# Patient Record
Sex: FEMALE | Race: OTHER | HISPANIC OR LATINO | URBAN - METROPOLITAN AREA
[De-identification: names, ages, dates, MRNs, and addresses within clinical notes are randomized per-mention and may not be internally consistent; named-entity substitution may affect disease eponyms.]

---

## 2017-02-06 ENCOUNTER — INPATIENT (INPATIENT)
Facility: HOSPITAL | Age: 57
LOS: 8 days | Discharge: HOME | DRG: 546 | End: 2017-02-15
Attending: HOSPITALIST | Admitting: INTERNAL MEDICINE
Payer: COMMERCIAL

## 2017-02-06 VITALS
OXYGEN SATURATION: 96 % | HEART RATE: 102 BPM | TEMPERATURE: 98 F | RESPIRATION RATE: 18 BRPM | DIASTOLIC BLOOD PRESSURE: 109 MMHG | SYSTOLIC BLOOD PRESSURE: 168 MMHG

## 2017-02-06 DIAGNOSIS — E11.65 TYPE 2 DIABETES MELLITUS WITH HYPERGLYCEMIA: ICD-10-CM

## 2017-02-06 DIAGNOSIS — D64.9 ANEMIA, UNSPECIFIED: ICD-10-CM

## 2017-02-06 DIAGNOSIS — R10.9 UNSPECIFIED ABDOMINAL PAIN: ICD-10-CM

## 2017-02-06 LAB
ACETONE SERPL-MCNC: ABNORMAL
ALBUMIN SERPL ELPH-MCNC: 2.6 G/DL — LOW (ref 3.3–5.2)
ALP SERPL-CCNC: 104 U/L — SIGNIFICANT CHANGE UP (ref 40–120)
ALT FLD-CCNC: 8 U/L — SIGNIFICANT CHANGE UP
ANION GAP SERPL CALC-SCNC: 15 MMOL/L — SIGNIFICANT CHANGE UP (ref 5–17)
ANISOCYTOSIS BLD QL: SLIGHT — SIGNIFICANT CHANGE UP
APTT BLD: 29.4 SEC — SIGNIFICANT CHANGE UP (ref 27.5–37.4)
AST SERPL-CCNC: 9 U/L — SIGNIFICANT CHANGE UP
BASE EXCESS BLDV CALC-SCNC: -2.1 MMOL/L — SIGNIFICANT CHANGE UP (ref -3–3)
BASOPHILS # BLD AUTO: 0 K/UL — SIGNIFICANT CHANGE UP (ref 0–0.2)
BASOPHILS NFR BLD AUTO: 0.1 % — SIGNIFICANT CHANGE UP (ref 0–2)
BILIRUB SERPL-MCNC: 0.6 MG/DL — SIGNIFICANT CHANGE UP (ref 0.4–2)
BLD GP AB SCN SERPL QL: SIGNIFICANT CHANGE UP
BLOOD GAS COMMENTS, VENOUS: SIGNIFICANT CHANGE UP
BUN SERPL-MCNC: 27 MG/DL — HIGH (ref 8–20)
CALCIUM SERPL-MCNC: 8.6 MG/DL — SIGNIFICANT CHANGE UP (ref 8.6–10.2)
CHLORIDE SERPL-SCNC: 89 MMOL/L — LOW (ref 98–107)
CO2 SERPL-SCNC: 21 MMOL/L — LOW (ref 22–29)
CREAT SERPL-MCNC: 1.24 MG/DL — SIGNIFICANT CHANGE UP (ref 0.5–1.3)
EOSINOPHIL # BLD AUTO: 0 K/UL — SIGNIFICANT CHANGE UP (ref 0–0.5)
GAS PNL BLDV: SIGNIFICANT CHANGE UP
GLUCOSE SERPL-MCNC: 679 MG/DL — CRITICAL HIGH (ref 70–115)
HCO3 BLDV-SCNC: 22 MMOL/L — SIGNIFICANT CHANGE UP (ref 20–26)
HCT VFR BLD CALC: 21.2 % — LOW (ref 37–47)
HGB BLD-MCNC: 6.9 G/DL — CRITICAL LOW (ref 12–16)
HOROWITZ INDEX BLDV+IHG-RTO: SIGNIFICANT CHANGE UP
HYPOCHROMIA BLD QL: SLIGHT — SIGNIFICANT CHANGE UP
INR BLD: 1.05 RATIO — SIGNIFICANT CHANGE UP (ref 0.88–1.16)
LIDOCAIN IGE QN: 58 U/L — HIGH (ref 22–51)
LYMPHOCYTES # BLD AUTO: 1.1 K/UL — SIGNIFICANT CHANGE UP (ref 1–4.8)
LYMPHOCYTES # BLD AUTO: 11 % — LOW (ref 20–55)
MACROCYTES BLD QL: SLIGHT — SIGNIFICANT CHANGE UP
MCHC RBC-ENTMCNC: 27.1 PG — SIGNIFICANT CHANGE UP (ref 27–31)
MCHC RBC-ENTMCNC: 32.5 G/DL — SIGNIFICANT CHANGE UP (ref 32–36)
MCV RBC AUTO: 83.1 FL — SIGNIFICANT CHANGE UP (ref 81–99)
MICROCYTES BLD QL: SIGNIFICANT CHANGE UP
MONOCYTES # BLD AUTO: 0.4 K/UL — SIGNIFICANT CHANGE UP (ref 0–0.8)
MONOCYTES NFR BLD AUTO: 4 % — SIGNIFICANT CHANGE UP (ref 3–10)
NEUTROPHILS # BLD AUTO: 8.1 K/UL — HIGH (ref 1.8–8)
NEUTROPHILS NFR BLD AUTO: 84 % — HIGH (ref 37–73)
NEUTS BAND # BLD: 1 % — SIGNIFICANT CHANGE UP (ref 0–8)
PCO2 BLDV: 39 MMHG — SIGNIFICANT CHANGE UP (ref 35–50)
PH BLDV: 7.38 — SIGNIFICANT CHANGE UP (ref 7.35–7.45)
PLAT MORPH BLD: ABNORMAL
PLATELET # BLD AUTO: 756 K/UL — HIGH (ref 150–400)
PO2 BLDV: 37 MMHG — SIGNIFICANT CHANGE UP (ref 25–45)
POIKILOCYTOSIS BLD QL AUTO: SLIGHT — SIGNIFICANT CHANGE UP
POLYCHROMASIA BLD QL SMEAR: SLIGHT — SIGNIFICANT CHANGE UP
POTASSIUM SERPL-MCNC: 4.2 MMOL/L — SIGNIFICANT CHANGE UP (ref 3.5–5.3)
POTASSIUM SERPL-SCNC: 4.2 MMOL/L — SIGNIFICANT CHANGE UP (ref 3.5–5.3)
PROT SERPL-MCNC: 7.4 G/DL — SIGNIFICANT CHANGE UP (ref 6.6–8.7)
PROTHROM AB SERPL-ACNC: 11.6 SEC — SIGNIFICANT CHANGE UP (ref 10–13.1)
RBC # BLD: 2.55 M/UL — LOW (ref 4.4–5.2)
RBC # FLD: 13.9 % — SIGNIFICANT CHANGE UP (ref 11–15.6)
RBC BLD AUTO: ABNORMAL
SAO2 % BLDV: 63 % — LOW (ref 67–88)
SODIUM SERPL-SCNC: 125 MMOL/L — LOW (ref 135–145)
TYPE + AB SCN PNL BLD: SIGNIFICANT CHANGE UP
WBC # BLD: 10.22 K/UL — SIGNIFICANT CHANGE UP (ref 4.8–10.8)
WBC # FLD AUTO: 10.22 K/UL — SIGNIFICANT CHANGE UP (ref 4.8–10.8)

## 2017-02-06 PROCEDURE — 71020: CPT | Mod: 26

## 2017-02-06 PROCEDURE — 99223 1ST HOSP IP/OBS HIGH 75: CPT

## 2017-02-06 PROCEDURE — 99291 CRITICAL CARE FIRST HOUR: CPT

## 2017-02-06 RX ORDER — DEXTROSE 50 % IN WATER 50 %
25 SYRINGE (ML) INTRAVENOUS ONCE
Qty: 0 | Refills: 0 | Status: DISCONTINUED | OUTPATIENT
Start: 2017-02-06 | End: 2017-02-07

## 2017-02-06 RX ORDER — INSULIN GLARGINE 100 [IU]/ML
5 INJECTION, SOLUTION SUBCUTANEOUS AT BEDTIME
Qty: 0 | Refills: 0 | Status: DISCONTINUED | OUTPATIENT
Start: 2017-02-06 | End: 2017-02-09

## 2017-02-06 RX ORDER — SODIUM CHLORIDE 9 MG/ML
1000 INJECTION INTRAMUSCULAR; INTRAVENOUS; SUBCUTANEOUS ONCE
Qty: 0 | Refills: 0 | Status: COMPLETED | OUTPATIENT
Start: 2017-02-06 | End: 2017-02-06

## 2017-02-06 RX ORDER — GLUCAGON INJECTION, SOLUTION 0.5 MG/.1ML
1 INJECTION, SOLUTION SUBCUTANEOUS ONCE
Qty: 0 | Refills: 0 | Status: DISCONTINUED | OUTPATIENT
Start: 2017-02-06 | End: 2017-02-07

## 2017-02-06 RX ORDER — SODIUM CHLORIDE 9 MG/ML
1000 INJECTION INTRAMUSCULAR; INTRAVENOUS; SUBCUTANEOUS
Qty: 0 | Refills: 0 | Status: DISCONTINUED | OUTPATIENT
Start: 2017-02-06 | End: 2017-02-07

## 2017-02-06 RX ORDER — INSULIN HUMAN 100 [IU]/ML
12 INJECTION, SOLUTION SUBCUTANEOUS ONCE
Qty: 0 | Refills: 0 | Status: COMPLETED | OUTPATIENT
Start: 2017-02-06 | End: 2017-02-06

## 2017-02-06 RX ORDER — ONDANSETRON 8 MG/1
4 TABLET, FILM COATED ORAL EVERY 6 HOURS
Qty: 0 | Refills: 0 | Status: DISCONTINUED | OUTPATIENT
Start: 2017-02-06 | End: 2017-02-15

## 2017-02-06 RX ORDER — PANTOPRAZOLE SODIUM 20 MG/1
80 TABLET, DELAYED RELEASE ORAL ONCE
Qty: 0 | Refills: 0 | Status: COMPLETED | OUTPATIENT
Start: 2017-02-06 | End: 2017-02-06

## 2017-02-06 RX ORDER — DEXTROSE 50 % IN WATER 50 %
12.5 SYRINGE (ML) INTRAVENOUS ONCE
Qty: 0 | Refills: 0 | Status: DISCONTINUED | OUTPATIENT
Start: 2017-02-06 | End: 2017-02-07

## 2017-02-06 RX ORDER — MAGNESIUM SULFATE 500 MG/ML
1 VIAL (ML) INJECTION ONCE
Qty: 0 | Refills: 0 | Status: COMPLETED | OUTPATIENT
Start: 2017-02-06 | End: 2017-02-07

## 2017-02-06 RX ORDER — PANTOPRAZOLE SODIUM 20 MG/1
8 TABLET, DELAYED RELEASE ORAL
Qty: 80 | Refills: 0 | Status: DISCONTINUED | OUTPATIENT
Start: 2017-02-06 | End: 2017-02-07

## 2017-02-06 RX ORDER — DEXTROSE 50 % IN WATER 50 %
1 SYRINGE (ML) INTRAVENOUS ONCE
Qty: 0 | Refills: 0 | Status: DISCONTINUED | OUTPATIENT
Start: 2017-02-06 | End: 2017-02-07

## 2017-02-06 RX ORDER — SODIUM CHLORIDE 9 MG/ML
1000 INJECTION, SOLUTION INTRAVENOUS
Qty: 0 | Refills: 0 | Status: DISCONTINUED | OUTPATIENT
Start: 2017-02-06 | End: 2017-02-07

## 2017-02-06 RX ADMIN — SODIUM CHLORIDE 3000 MILLILITER(S): 9 INJECTION INTRAMUSCULAR; INTRAVENOUS; SUBCUTANEOUS at 14:31

## 2017-02-06 RX ADMIN — INSULIN HUMAN 12 UNIT(S): 100 INJECTION, SOLUTION SUBCUTANEOUS at 17:15

## 2017-02-06 RX ADMIN — SODIUM CHLORIDE 3000 MILLILITER(S): 9 INJECTION INTRAMUSCULAR; INTRAVENOUS; SUBCUTANEOUS at 14:40

## 2017-02-06 RX ADMIN — INSULIN GLARGINE 5 UNIT(S): 100 INJECTION, SOLUTION SUBCUTANEOUS at 23:14

## 2017-02-06 RX ADMIN — PANTOPRAZOLE SODIUM 80 MILLIGRAM(S): 20 TABLET, DELAYED RELEASE ORAL at 14:32

## 2017-02-06 RX ADMIN — PANTOPRAZOLE SODIUM 10 MG/HR: 20 TABLET, DELAYED RELEASE ORAL at 17:14

## 2017-02-06 NOTE — H&P ADULT. - HISTORY OF PRESENT ILLNESS
The patient is a 56 year old female with history of diabetes mellitus type 2 on insulin who presented to the ER from the office of her PMD. Seen and examined with a  at the bedside. According to the patient for the past 2 months she has had complaints of vomiting atleast 2-3 times a day associated with generalized abdominal pain. Her symptoms have progressively worsened and she has had associated weakness. She uses lantus at home for her diabetes and says she is compliant. She went to her PMD today with complaints of weakness and dark stool. Her hemoglobin was noted to be 5gm/dl therefore she was sent to the ER. In the ER hb is 6.7gm/dl with positive melena and guaic positive. Also noted to be hyperglycemic with hyponatremia given 2 liters of normal saline with insulin.

## 2017-02-06 NOTE — ED ADULT NURSE NOTE - OBJECTIVE STATEMENT
55y/o female c/o dizziness and weakness x 3 days. Pt AOx3, resp even unlabored. denies chest pain, c/o SOb with exertion. Pt states she has had coffee ground emesis and dark stool, none noted in the ED. Pt pale for race. will continue to monitor.

## 2017-02-06 NOTE — ED ADULT NURSE REASSESSMENT NOTE - NS ED NURSE REASSESS COMMENT FT1
Pt AOx3, resp even unlabored. completed 1 unit of blood. no complaints at this time. will continue to monitor.

## 2017-02-06 NOTE — ED PROVIDER NOTE - CRITICAL CARE PROVIDED
consultation with other physicians/documentation/interpretation of diagnostic studies/additional history taking/direct patient care (not related to procedure)

## 2017-02-06 NOTE — ED CLERICAL - NS ED CLERK UNITS
6TWR 6TWR/[id number] 6TWR/PAIN ACS 6TWR/[id number] PAIN ACS/6TWR MON/PAIN ACS/2GUL PAIN ACS/5TWR /6TWR 5TWR/mon 5TWR/[id number] med overflow

## 2017-02-06 NOTE — CONSULT NOTE ADULT - ASSESSMENT
Severe anemia.  Alleged chronic black stool with negative colonoscopy 2 yrs ago.  R/o PUD,  Gastric Neoplasia or Gastric motility disorder, or erosive esophagitis.

## 2017-02-06 NOTE — CONSULT NOTE ADULT - SUBJECTIVE AND OBJECTIVE BOX
HPI:  The patient is a 56 year old  female with history of diabetes mellitus type 2 on insulin who presented to the ER from the office of her PMD. Daughter serves as  at bedside.   According to the patient for the past 2 months she has had vomiting several times per day, occasionall pink in color.  Notes  generalized upper abdominal pain, anorexia and weight loss. Daughter alleges that she has lost 100#/ 3 months.  Also notes early satiety and frequent post prandial vomiting.  No prior EGD.  No prior hx of PUD.  No intake of aspirin or NSAIDs.  Notes 2 BM's/ day for past 2 months.  Alleges that stool has been black.  Daughter states that a colonoscopy was normal in 2015.  Unclear where it was performed.    She uses lantus at home for her diabetes and says she is compliant. She went to her PMD today with complaints of weakness and dark stool. Her hemoglobin was noted to be 5gm/dl therefore she was sent to the ER. In the ER hb is 6.7gm/dl with positive melena and guaic positive. Also noted to be hyperglycemic (679),  with hyponatremia & given 2 liters of normal saline with insulin. (06 Feb 2017 19:02)      PAST MEDICAL & SURGICAL HISTORY:  DM (diabetes mellitus),  Newly diagnosed 2 months ago.    No significant past surgical history      ROS:  No Heartburn, regurgitation, dysphagia, odynophagia.  No dyspepsia  No abdominal pain.    No Nausea, vomiting.  No Bleeding.  No hematemesis.   No diarrhea.    No hematochesia.  No weight loss, anorexia.  No edema.      MEDICATIONS  (STANDING):  pantoprazole Infusion 8mG/Hr IV Continuous <Continuous>  sodium chloride 0.9%. 1000milliLiter(s) IV Continuous <Continuous>  magnesium sulfate  IVPB 1Gram(s) IV Intermittent once  dextrose 5%. 1000milliLiter(s) IV Continuous <Continuous>  dextrose 50% Injectable 12.5Gram(s) IV Push once  dextrose 50% Injectable 25Gram(s) IV Push once  dextrose 50% Injectable 25Gram(s) IV Push once  insulin glargine Injectable (LANTUS) 5Unit(s) SubCutaneous at bedtime    MEDICATIONS  (PRN):  ondansetron Injectable 4milliGRAM(s) IV Push every 6 hours PRN Nausea  dextrose Gel 1Dose(s) Oral once PRN Blood Glucose LESS THAN 70 milliGRAM(s)/deciliter  glucagon  Injectable 1milliGRAM(s) IntraMuscular once PRN Glucose LESS THAN 70 milligrams/deciliter      Allergies    No Known Allergies    SOCIAL HISTORY:    FAMILY HISTORY:  No pertinent family history in first degree relatives;  No CRCa in FH    Vital Signs Last 24 Hrs  T(C): 37.7, Max: 37.7 (02-06 @ 19:26)  T(F): 99.9, Max: 99.9 (02-06 @ 19:26)  HR: 92 (92 - 102)  BP: 145/86 (145/86 - 168/109)  BP(mean): --  RR: 18 (18 - 18)  SpO2: 91% (91% - 96%)    PHYSICAL EXAM:    GENERAL: NAD, well-groomed, well-developed  HEAD:  Atraumatic, Normocephalic  EYES: EOMI, PERRLA, conjunctiva and sclera clear  ENMT: No tonsillar erythema, exudates, or enlargement; Moist mucous membranes, Good dentition, No lesions  NECK: Supple, No JVD, Normal thyroid  CHEST/LUNG: Clear to percussion bilaterally; No rales, rhonchi, wheezing, or rubs  HEART: Regular rate and rhythm; No murmurs, rubs, or gallops  ABDOMEN: Soft, Nontender, Nondistended; Bowel sounds present.  No scars.  No HSM.  No MTR.  REGULO: normal.  Empty rectal vault.  No stool or blood.   EXTREMITIES:  2+ Peripheral Pulses, No clubbing, cyanosis, or edema  LYMPH: No lymphadenopathy noted  SKIN: No rashes or lesions      LABS:                        6.9    10.22 )-----------( 756      ( 06 Feb 2017 13:01 )   MCV: 83.               21.2     06 Feb 2017 13:01    125    |  89     |  27.0   ----------------------------<  679    4.2     |  21.0   |  1.24     Ca    8.6        06 Feb 2017 13:01  Phos  4.0       06 Feb 2017 13:01  Mg     1.7       06 Feb 2017 13:01    TPro  7.4    /  Alb  2.6    /  TBili  0.6    /  DBili  x      /  AST  9      /  ALT  8      /  AlkPhos  104    06 Feb 2017 13:01    PT/INR - ( 06 Feb 2017 13:01 )   PT: 11.6 sec;   INR: 1.05 ratio         PTT - ( 06 Feb 2017 13:01 )  PTT:29.4 sec       LIVER FUNCTIONS - ( 06 Feb 2017 13:01 )  Alb: 2.6 g/dL / Pro: 7.4 g/dL / ALK PHOS: 104 U/L / ALT: 8 U/L / AST: 9 U/L / GGT: x               RADIOLOGY & ADDITIONAL STUDIES:

## 2017-02-06 NOTE — ED ADULT TRIAGE NOTE - CHIEF COMPLAINT QUOTE
Patient arrived to ED today with c/o weakness, dizziness, and vomiting.  Patient was told to come to ED because her hemoglobin was low.

## 2017-02-06 NOTE — H&P ADULT. - ASSESSMENT
The patient is a 56 year old female with history of diabetes mellitus type 2 on insulin who presented to the ER from the office of her PMD. Seen and examined with a  at the bedside. According to the patient for the past 2 months she has had complaints of vomiting atleast 2-3 times a day associated with generalized abdominal pain. Her symptoms have progressively worsened and she has had associated weakness. She uses lantus at home for her diabetes and says she is compliant. She went to her PMD today with complaints of weakness and dark stool. Her hemoglobin was noted to be 5gm/dl therefore she was sent to the ER. In the ER hb is 6.7gm/dl with positive melena and guaic positive. Also noted to be hyperglycemic with hyponatremia given 2 liters of normal saline with insulin.     Assesment. The patient is a 56 year old female with history of diabetes mellitus type 2 on insulin who presented to the ER from the office of her PMD. Seen and examined with a  at the bedside. According to the patient for the past 2 months she has had complaints of vomiting atleast 2-3 times a day associated with generalized abdominal pain. Her symptoms have progressively worsened and she has had associated weakness. She uses lantus at home for her diabetes and says she is compliant. She went to her PMD today with complaints of weakness and dark stool. Her hemoglobin was noted to be 5gm/dl therefore she was sent to the ER. In the ER hb is 6.7gm/dl with positive melena and guaic positive. Also noted to be hyperglycemic with hyponatremia given 2 liters of normal saline with insulin.     Assesment/Plan:    1. Acute blood loss anemia likely secondary to GI bleed- IV fluids, npo, IV protonix, monitor cbc q6 hours, GI Dr Nunez consulted.   2. Hyperglycemia: IV hydration, start lantus at bedtime. Repeat BMP  check hba1c  3. Hyponatremia: Corrected for hyperglycemia is 134meq  No VTE given GI bleed

## 2017-02-06 NOTE — ED STATDOCS - PROGRESS NOTE DETAILS
55 y/o female, w/ h/o anemia c/o weakness, vomiting x 1 month w/ occasional blood. Recent travel to   [facility], [place] in 10/2016. Pt was her PCP Dr. Lopez earlier today for her sx and had a hemoglobin of 5.8 on finger stick. When asked pt confirms she has had dark black stool for 1-2 months. Denies current CP. Pt's son acting as [place]- speaking .

## 2017-02-06 NOTE — ED PROVIDER NOTE - OBJECTIVE STATEMENT
pt presents with weakness and melena over last week. denies synciope. denies bliood thinners. denies fever. denies HA or neck pain. no chest pain or sob. no abd pain. no n/v/d. no urinary f/u/d. no back pain. no motor or sensory deficits. denies drug use. no recent travel. no rash. no other acute issues symptoms or concerns

## 2017-02-06 NOTE — CONSULT NOTE ADULT - PROBLEM SELECTOR RECOMMENDATION 9
Transfuse to Hgb> 7.  Stool for OB.  Iron studies.  Check CXR.  Eventual EGD. Procedure explained to daughter at bedside who understands and agrees to proceed.

## 2017-02-06 NOTE — ED PROVIDER NOTE - MEDICAL DECISION MAKING DETAILS
need for immediate volume resusciation with prbc to prevent detioration with severe anemia spoke with IM and GI they agree to current plan of care

## 2017-02-07 DIAGNOSIS — E87.1 HYPO-OSMOLALITY AND HYPONATREMIA: ICD-10-CM

## 2017-02-07 LAB
ALBUMIN SERPL ELPH-MCNC: 2.1 G/DL — LOW (ref 3.3–5.2)
ALP SERPL-CCNC: 86 U/L — SIGNIFICANT CHANGE UP (ref 40–120)
ALT FLD-CCNC: 7 U/L — SIGNIFICANT CHANGE UP
AMYLASE P1 CFR SERPL: 48 U/L — SIGNIFICANT CHANGE UP (ref 36–128)
ANION GAP SERPL CALC-SCNC: 13 MMOL/L — SIGNIFICANT CHANGE UP (ref 5–17)
AST SERPL-CCNC: 10 U/L — SIGNIFICANT CHANGE UP
BILIRUB SERPL-MCNC: 1.1 MG/DL — SIGNIFICANT CHANGE UP (ref 0.4–2)
BUN SERPL-MCNC: 23 MG/DL — HIGH (ref 8–20)
CALCIUM SERPL-MCNC: 8.5 MG/DL — LOW (ref 8.6–10.2)
CHLORIDE SERPL-SCNC: 97 MMOL/L — LOW (ref 98–107)
CO2 SERPL-SCNC: 21 MMOL/L — LOW (ref 22–29)
CREAT SERPL-MCNC: 1.16 MG/DL — SIGNIFICANT CHANGE UP (ref 0.5–1.3)
FERRITIN SERPL-MCNC: 287.4 NG/ML — SIGNIFICANT CHANGE UP (ref 11–306)
GLUCOSE SERPL-MCNC: 330 MG/DL — HIGH (ref 70–115)
HBA1C BLD-MCNC: 10.2 % — HIGH (ref 4–5.6)
HCT VFR BLD CALC: 28.4 % — LOW (ref 37–47)
HCT VFR BLD CALC: 28.6 % — LOW (ref 37–47)
HCT VFR BLD CALC: 30.8 % — LOW (ref 37–47)
HGB BLD-MCNC: 10.3 G/DL — LOW (ref 12–16)
HGB BLD-MCNC: 9.6 G/DL — LOW (ref 12–16)
HGB BLD-MCNC: 9.9 G/DL — LOW (ref 12–16)
IRON SATN MFR SERPL: 19 % — SIGNIFICANT CHANGE UP (ref 14–50)
IRON SATN MFR SERPL: 41 UG/DL — SIGNIFICANT CHANGE UP (ref 37–145)
LIDOCAIN IGE QN: 14 U/L — LOW (ref 22–51)
MAGNESIUM SERPL-MCNC: 2.1 MG/DL — SIGNIFICANT CHANGE UP (ref 1.8–2.5)
MCHC RBC-ENTMCNC: 27.5 PG — SIGNIFICANT CHANGE UP (ref 27–31)
MCHC RBC-ENTMCNC: 33.4 G/DL — SIGNIFICANT CHANGE UP (ref 32–36)
MCV RBC AUTO: 82.4 FL — SIGNIFICANT CHANGE UP (ref 81–99)
PLATELET # BLD AUTO: 619 K/UL — HIGH (ref 150–400)
POTASSIUM SERPL-MCNC: 3.9 MMOL/L — SIGNIFICANT CHANGE UP (ref 3.5–5.3)
POTASSIUM SERPL-SCNC: 3.9 MMOL/L — SIGNIFICANT CHANGE UP (ref 3.5–5.3)
PROT SERPL-MCNC: 6.8 G/DL — SIGNIFICANT CHANGE UP (ref 6.6–8.7)
RBC # BLD: 3.74 M/UL — LOW (ref 4.4–5.2)
RBC # FLD: 14.7 % — SIGNIFICANT CHANGE UP (ref 11–15.6)
SODIUM SERPL-SCNC: 131 MMOL/L — LOW (ref 135–145)
TIBC SERPL-MCNC: 213 UG/DL — LOW (ref 220–430)
WBC # BLD: 10.91 K/UL — HIGH (ref 4.8–10.8)
WBC # FLD AUTO: 10.91 K/UL — HIGH (ref 4.8–10.8)

## 2017-02-07 PROCEDURE — 71022: CPT | Mod: 26

## 2017-02-07 PROCEDURE — 99233 SBSQ HOSP IP/OBS HIGH 50: CPT

## 2017-02-07 RX ORDER — DEXTROSE 50 % IN WATER 50 %
25 SYRINGE (ML) INTRAVENOUS ONCE
Qty: 0 | Refills: 0 | Status: DISCONTINUED | OUTPATIENT
Start: 2017-02-07 | End: 2017-02-10

## 2017-02-07 RX ORDER — DEXTROSE 50 % IN WATER 50 %
1 SYRINGE (ML) INTRAVENOUS ONCE
Qty: 0 | Refills: 0 | Status: DISCONTINUED | OUTPATIENT
Start: 2017-02-07 | End: 2017-02-10

## 2017-02-07 RX ORDER — SODIUM CHLORIDE 9 MG/ML
1000 INJECTION INTRAMUSCULAR; INTRAVENOUS; SUBCUTANEOUS
Qty: 0 | Refills: 0 | Status: DISCONTINUED | OUTPATIENT
Start: 2017-02-07 | End: 2017-02-09

## 2017-02-07 RX ORDER — DEXTROSE 50 % IN WATER 50 %
12.5 SYRINGE (ML) INTRAVENOUS ONCE
Qty: 0 | Refills: 0 | Status: DISCONTINUED | OUTPATIENT
Start: 2017-02-07 | End: 2017-02-10

## 2017-02-07 RX ORDER — SODIUM CHLORIDE 9 MG/ML
1000 INJECTION, SOLUTION INTRAVENOUS
Qty: 0 | Refills: 0 | Status: DISCONTINUED | OUTPATIENT
Start: 2017-02-07 | End: 2017-02-10

## 2017-02-07 RX ORDER — HYDRALAZINE HCL 50 MG
25 TABLET ORAL EVERY 6 HOURS
Qty: 0 | Refills: 0 | Status: DISCONTINUED | OUTPATIENT
Start: 2017-02-07 | End: 2017-02-15

## 2017-02-07 RX ORDER — PANTOPRAZOLE SODIUM 20 MG/1
40 TABLET, DELAYED RELEASE ORAL EVERY 12 HOURS
Qty: 0 | Refills: 0 | Status: DISCONTINUED | OUTPATIENT
Start: 2017-02-07 | End: 2017-02-10

## 2017-02-07 RX ORDER — GLUCAGON INJECTION, SOLUTION 0.5 MG/.1ML
1 INJECTION, SOLUTION SUBCUTANEOUS ONCE
Qty: 0 | Refills: 0 | Status: DISCONTINUED | OUTPATIENT
Start: 2017-02-07 | End: 2017-02-10

## 2017-02-07 RX ORDER — INSULIN LISPRO 100/ML
VIAL (ML) SUBCUTANEOUS
Qty: 0 | Refills: 0 | Status: DISCONTINUED | OUTPATIENT
Start: 2017-02-07 | End: 2017-02-10

## 2017-02-07 RX ADMIN — Medication 100 GRAM(S): at 00:53

## 2017-02-07 RX ADMIN — PANTOPRAZOLE SODIUM 40 MILLIGRAM(S): 20 TABLET, DELAYED RELEASE ORAL at 18:36

## 2017-02-07 RX ADMIN — PANTOPRAZOLE SODIUM 10 MG/HR: 20 TABLET, DELAYED RELEASE ORAL at 01:38

## 2017-02-07 RX ADMIN — Medication 6: at 18:34

## 2017-02-07 RX ADMIN — SODIUM CHLORIDE 150 MILLILITER(S): 9 INJECTION INTRAMUSCULAR; INTRAVENOUS; SUBCUTANEOUS at 00:53

## 2017-02-07 RX ADMIN — SODIUM CHLORIDE 75 MILLILITER(S): 9 INJECTION INTRAMUSCULAR; INTRAVENOUS; SUBCUTANEOUS at 21:15

## 2017-02-07 RX ADMIN — INSULIN GLARGINE 5 UNIT(S): 100 INJECTION, SOLUTION SUBCUTANEOUS at 19:20

## 2017-02-07 NOTE — PROGRESS NOTE ADULT - SUBJECTIVE AND OBJECTIVE BOX
Pt seen and examined  No active GI bleeding. repeat labs from this AM are currently pending.    MEDICATIONS:  MEDICATIONS  (STANDING):  pantoprazole Infusion 8mG/Hr IV Continuous <Continuous>  sodium chloride 0.9%. 1000milliLiter(s) IV Continuous <Continuous>  dextrose 5%. 1000milliLiter(s) IV Continuous <Continuous>  dextrose 50% Injectable 12.5Gram(s) IV Push once  dextrose 50% Injectable 25Gram(s) IV Push once  dextrose 50% Injectable 25Gram(s) IV Push once  insulin glargine Injectable (LANTUS) 5Unit(s) SubCutaneous at bedtime    MEDICATIONS  (PRN):  ondansetron Injectable 4milliGRAM(s) IV Push every 6 hours PRN Nausea  dextrose Gel 1Dose(s) Oral once PRN Blood Glucose LESS THAN 70 milliGRAM(s)/deciliter  glucagon  Injectable 1milliGRAM(s) IntraMuscular once PRN Glucose LESS THAN 70 milligrams/deciliter      Allergies    No Known Allergies    Intolerances        Vital Signs Last 24 Hrs  T(C): 37.3, Max: 37.3 (02-07 @ 05:55)  T(F): 99.1, Max: 99.2 (02-07 @ 05:55)  HR: 73 (73 - 102)  BP: 153/83 (143/80 - 179/91)  BP(mean): --  RR: 20 (18 - 22)  SpO2: 94% (88% - 96%)      PHYSICAL EXAM:    General: Well developed; well nourished; in no acute distress  HEENT: MMM, conjunctiva and sclera clear  Lungs: clear to auscultation and percussion.  Cor: RRR S1, S2 only w/o mrg or clicks  Gastrointestinal: Abdomen: Soft non-tender non-distended; Normal bowel sounds; No hepatosplenomegaly  Extremities: no c/c/e  Neuro: Pt. a + o x 3    LABS:      CBC Full  -  ( 06 Feb 2017 13:01 )  WBC Count : 10.22 K/uL  Hemoglobin : 6.9 g/dL  Hematocrit : 21.2 %  Platelet Count - Automated : 756 K/uL  Mean Cell Volume : 83.1 fl  Mean Cell Hemoglobin : 27.1 pg  Mean Cell Hemoglobin Concentration : 32.5 g/dL  Auto Neutrophil # : 8.1 K/uL  Auto Lymphocyte # : 1.1 K/uL  Auto Monocyte # : 0.4 K/uL  Auto Eosinophil # : 0.0 K/uL  Auto Basophil # : 0.0 K/uL  Auto Neutrophil % : 84.0 %  Auto Lymphocyte % : 11.0 %  Auto Monocyte % : 4.0 %  Auto Eosinophil % : x  Auto Basophil % : 0.1 %    06 Feb 2017 13:01    125    |  89     |  27.0   ----------------------------<  679    4.2     |  21.0   |  1.24     Ca    8.6        06 Feb 2017 13:01  Phos  4.0       06 Feb 2017 13:01  Mg     1.7       06 Feb 2017 13:01    TPro  7.4    /  Alb  2.6    /  TBili  0.6    /  DBili  x      /  AST  9      /  ALT  8      /  AlkPhos  104    06 Feb 2017 13:01    PT/INR - ( 06 Feb 2017 13:01 )   PT: 11.6 sec;   INR: 1.05 ratio         PTT - ( 06 Feb 2017 13:01 )  PTT:29.4 sec                  RADIOLOGY & ADDITIONAL STUDIES (The following images were personally reviewed):

## 2017-02-07 NOTE — PROGRESS NOTE ADULT - SUBJECTIVE AND OBJECTIVE BOX
INTERVAL HPI/OVERNIGHT EVENTS:    CC: Weakness    Patient seen and examined, feels nauseous mild epigastric discomfort. denies diarrhea or loose stool. Seen with  at the bedside.       Vital Signs Last 24 Hrs  T(C): 36.1, Max: 37.3 (02-07 @ 05:55)  T(F): 97, Max: 99.2 (02-07 @ 05:55)  HR: 77 (73 - 92)  BP: 167/77 (143/80 - 179/91)  BP(mean): --  RR: 18 (18 - 22)  SpO2: 93% (88% - 96%)    PHYSICAL EXAM:    GENERAL: NAD, AOX3  HEAD:  Atraumatic, Normocephalic  EYES: EOMI, PERRLA, conjunctiva and sclera clear  ENMT: Moist mucous membranes  NECK: Supple, No JVD  CHEST/LUNG: Clear to auscultation bilaterally; No rales, rhonchi, wheezing, or rubs  HEART: Regular rate and rhythm; No murmurs, rubs, or gallops  ABDOMEN: Soft, Nontender, Nondistended; Bowel sounds present  EXTREMITIES:  2+ Peripheral Pulses, No clubbing, cyanosis, or edema        MEDICATIONS  (STANDING):  insulin glargine Injectable (LANTUS) 5Unit(s) SubCutaneous at bedtime  pantoprazole  Injectable 40milliGRAM(s) IV Push every 12 hours  insulin lispro (HumaLOG) corrective regimen sliding scale  SubCutaneous three times a day before meals  dextrose 5%. 1000milliLiter(s) IV Continuous <Continuous>  dextrose 50% Injectable 12.5Gram(s) IV Push once  dextrose 50% Injectable 25Gram(s) IV Push once  dextrose 50% Injectable 25Gram(s) IV Push once  sodium chloride 0.9%. 1000milliLiter(s) IV Continuous <Continuous>    MEDICATIONS  (PRN):  ondansetron Injectable 4milliGRAM(s) IV Push every 6 hours PRN Nausea  dextrose Gel 1Dose(s) Oral once PRN Blood Glucose LESS THAN 70 milliGRAM(s)/deciliter  glucagon  Injectable 1milliGRAM(s) IntraMuscular once PRN Glucose LESS THAN 70 milligrams/deciliter      Allergies    No Known Allergies    Intolerances          LABS:                          9.9    x     )-----------( x        ( 07 Feb 2017 11:49 )             28.6     07 Feb 2017 07:58    131    |  97     |  23.0   ----------------------------<  330    3.9     |  21.0   |  1.16     Ca    8.5        07 Feb 2017 07:58  Phos  4.0       06 Feb 2017 13:01  Mg     2.1       07 Feb 2017 07:58    TPro  6.8    /  Alb  2.1    /  TBili  1.1    /  DBili  x      /  AST  10     /  ALT  7      /  AlkPhos  86     07 Feb 2017 07:58    PT/INR - ( 06 Feb 2017 13:01 )   PT: 11.6 sec;   INR: 1.05 ratio         PTT - ( 06 Feb 2017 13:01 )  PTT:29.4 sec      RADIOLOGY & ADDITIONAL TESTS:

## 2017-02-07 NOTE — PROGRESS NOTE ADULT - PROBLEM SELECTOR PLAN 1
Stable status post transfusion 2 units prbc  + guaic- RUle out GI bleed  Monitor cbc  Follow up stool for occult blood

## 2017-02-07 NOTE — PROGRESS NOTE ADULT - PROBLEM SELECTOR PLAN 1
Iron studies ordered. Eventual EGD when hyperglycemia resolved. IV Pantoprazole ordered. Consider diet advancement if tolerated. Repeat labs ordered.

## 2017-02-07 NOTE — ED ADULT NURSE REASSESSMENT NOTE - NS ED NURSE REASSESS COMMENT FT1
Pt receiving 1 unit PRBC from unit #[id number]. Pt observed for s/s of transfusion reaction for the first 15 minutes. No symptoms noted @ this time. Will continue to monitor.

## 2017-02-07 NOTE — ED ADULT NURSE REASSESSMENT NOTE - NS ED NURSE REASSESS COMMENT FT1
Assumed pt care from EARNEST [facility] @ 1183. Pt is A&Ox3 in NAD resp even and unlabored. . Pt denies complaint.  IV clean dry and intact, running Protonix without difficulty. Safety maintained, Bed locked in lowest position. Pt to receive 2 more units of PRBCs. Pt awaiting bed placement. Will continue to monitor.

## 2017-02-07 NOTE — PROGRESS NOTE ADULT - SUBJECTIVE AND OBJECTIVE BOX
Patient is a 56y old  Female who presents with a chief complaint of dark stools (2017 11:01)  She is scheduled for an ENDOSCOPY.    PAST MEDICAL HISTORY:  DM (diabetes mellitus)      PAST SURGICAL HISTORY:  No past surgical history      MEDICATIONS  (STANDING):  insulin glargine Injectable (LANTUS) 5Unit(s) SubCutaneous at bedtime  pantoprazole  Injectable 40milliGRAM(s) IV Push every 12 hours  insulin lispro (HumaLOG) corrective regimen sliding scale  SubCutaneous three times a day before meals  sodium chloride 0.9%. 1000milliLiter(s) IV Continuous <Continuous>    MEDICATIONS  (PRN):  ondansetron Injectable 4milliGRAM(s) IV Push every 6 hours PRN Nausea  dextrose Gel 1Dose(s) Oral once PRN Blood Glucose LESS THAN 70 milliGRAM(s)/deciliter  glucagon  Injectable 1milliGRAM(s) IntraMuscular once PRN Glucose LESS THAN 70 milligrams/deciliter      Allergies:  No Known Drug Allergies      SOCIAL HISTORY:  The patient does not drink alcohol, smoke or use illicit drugs.                          9.9    x     )-----------( x        ( 2017 11:49 )             28.6       PT/INR - ( 2017 13:01 )   PT: 11.6 sec;   INR: 1.05 ratio         PTT - ( 2017 13:01 )  PTT:29.4 sec    2017 07:58    131    |  97     |  23.0   ----------------------------<  330    3.9     |  21.0   |  1.16     Ca    8.5        2017 07:58  Phos  4.0       2017 13:01  Mg     2.1       2017 07:58    TPro  6.8    /  Alb  2.1    /  TBili  1.1    /  DBili  x      /  AST  10     /  ALT  7      /  AlkPhos  86     2017 07:58    [id number]  Sinus tachycardia  wnl        CHEST P.A. LATERAL   2017    FINDINGS:  The airway is midline.  There are diffuse perihilar and right lower lobe airspace consolidations.   Differential diagnosis includes a pulmonary edema of cardiac or   noncardiac origin, infectious bronchopneumonia versus  pulmonary   hemorrhage. Less likely organizing pneumonia is symmetric   hypersensitivity pneumonia should be considered. There is no pleural   effusion or pneumothorax.   IMPRESSION:  Bilateral diffuse multifocal airspace consolidation as described..    ASA # =  2   Mallampati # = 4   (The patient has one or more loose teeth in the upper left side.)

## 2017-02-07 NOTE — ED ADULT NURSE REASSESSMENT NOTE - NS ED NURSE REASSESS COMMENT FT1
Pt IV infiltrated @ 345. Warm compress applied. IV removed and new IV placed.     @0400 1 unit of PRBC started  unit # [id number], stayed with pt for the first 15 mins to observe for s/s of transfusion reaction. None noted @ this time. Will continue to monitor.

## 2017-02-08 DIAGNOSIS — D64.9 ANEMIA, UNSPECIFIED: ICD-10-CM

## 2017-02-08 LAB
ANION GAP SERPL CALC-SCNC: 14 MMOL/L — SIGNIFICANT CHANGE UP (ref 5–17)
BASOPHILS # BLD AUTO: 0 K/UL — SIGNIFICANT CHANGE UP (ref 0–0.2)
BASOPHILS NFR BLD AUTO: 0.2 % — SIGNIFICANT CHANGE UP (ref 0–2)
BUN SERPL-MCNC: 20 MG/DL — SIGNIFICANT CHANGE UP (ref 8–20)
CALCIUM SERPL-MCNC: 8.5 MG/DL — LOW (ref 8.6–10.2)
CHLORIDE SERPL-SCNC: 95 MMOL/L — LOW (ref 98–107)
CO2 SERPL-SCNC: 18 MMOL/L — LOW (ref 22–29)
CREAT SERPL-MCNC: 1.17 MG/DL — SIGNIFICANT CHANGE UP (ref 0.5–1.3)
EOSINOPHIL # BLD AUTO: 0.2 K/UL — SIGNIFICANT CHANGE UP (ref 0–0.5)
EOSINOPHIL NFR BLD AUTO: 1.3 % — SIGNIFICANT CHANGE UP (ref 0–6)
ERYTHROCYTE [SEDIMENTATION RATE] IN BLOOD: 55 MM/HR — HIGH (ref 0–20)
GLUCOSE SERPL-MCNC: 281 MG/DL — HIGH (ref 70–115)
HCT VFR BLD CALC: 29.2 % — LOW (ref 37–47)
HGB BLD-MCNC: 10.1 G/DL — LOW (ref 12–16)
LYMPHOCYTES # BLD AUTO: 1.3 K/UL — SIGNIFICANT CHANGE UP (ref 1–4.8)
LYMPHOCYTES # BLD AUTO: 11.4 % — LOW (ref 20–55)
MCHC RBC-ENTMCNC: 27.9 PG — SIGNIFICANT CHANGE UP (ref 27–31)
MCHC RBC-ENTMCNC: 34.6 G/DL — SIGNIFICANT CHANGE UP (ref 32–36)
MCV RBC AUTO: 80.7 FL — LOW (ref 81–99)
MONOCYTES # BLD AUTO: 0.6 K/UL — SIGNIFICANT CHANGE UP (ref 0–0.8)
MONOCYTES NFR BLD AUTO: 5 % — SIGNIFICANT CHANGE UP (ref 3–10)
NEUTROPHILS # BLD AUTO: 9.4 K/UL — HIGH (ref 1.8–8)
NEUTROPHILS NFR BLD AUTO: 81.9 % — HIGH (ref 37–73)
PLATELET # BLD AUTO: 652 K/UL — HIGH (ref 150–400)
POTASSIUM SERPL-MCNC: 3.3 MMOL/L — LOW (ref 3.5–5.3)
POTASSIUM SERPL-SCNC: 3.3 MMOL/L — LOW (ref 3.5–5.3)
PROCALCITONIN SERPL-MCNC: <0.23 NG/ML — SIGNIFICANT CHANGE UP (ref 0–0.5)
RBC # BLD: 3.62 M/UL — LOW (ref 4.4–5.2)
RBC # FLD: 14.9 % — SIGNIFICANT CHANGE UP (ref 11–15.6)
SODIUM SERPL-SCNC: 127 MMOL/L — LOW (ref 135–145)
WBC # BLD: 11.48 K/UL — HIGH (ref 4.8–10.8)
WBC # FLD AUTO: 11.48 K/UL — HIGH (ref 4.8–10.8)

## 2017-02-08 PROCEDURE — 93970 EXTREMITY STUDY: CPT | Mod: 26

## 2017-02-08 PROCEDURE — 99233 SBSQ HOSP IP/OBS HIGH 50: CPT

## 2017-02-08 PROCEDURE — 71250 CT THORAX DX C-: CPT | Mod: 26

## 2017-02-08 RX ORDER — AZITHROMYCIN 500 MG/1
500 TABLET, FILM COATED ORAL EVERY 24 HOURS
Qty: 0 | Refills: 0 | Status: DISCONTINUED | OUTPATIENT
Start: 2017-02-09 | End: 2017-02-13

## 2017-02-08 RX ORDER — CEFTRIAXONE 500 MG/1
INJECTION, POWDER, FOR SOLUTION INTRAMUSCULAR; INTRAVENOUS
Qty: 0 | Refills: 0 | Status: DISCONTINUED | OUTPATIENT
Start: 2017-02-08 | End: 2017-02-13

## 2017-02-08 RX ORDER — CEFTRIAXONE 500 MG/1
1 INJECTION, POWDER, FOR SOLUTION INTRAMUSCULAR; INTRAVENOUS EVERY 24 HOURS
Qty: 0 | Refills: 0 | Status: DISCONTINUED | OUTPATIENT
Start: 2017-02-09 | End: 2017-02-13

## 2017-02-08 RX ORDER — AZITHROMYCIN 500 MG/1
TABLET, FILM COATED ORAL
Qty: 0 | Refills: 0 | Status: DISCONTINUED | OUTPATIENT
Start: 2017-02-08 | End: 2017-02-13

## 2017-02-08 RX ORDER — AZITHROMYCIN 500 MG/1
500 TABLET, FILM COATED ORAL ONCE
Qty: 0 | Refills: 0 | Status: COMPLETED | OUTPATIENT
Start: 2017-02-08 | End: 2017-02-08

## 2017-02-08 RX ORDER — CEFTRIAXONE 500 MG/1
1 INJECTION, POWDER, FOR SOLUTION INTRAMUSCULAR; INTRAVENOUS ONCE
Qty: 0 | Refills: 0 | Status: COMPLETED | OUTPATIENT
Start: 2017-02-08 | End: 2017-02-08

## 2017-02-08 RX ORDER — POTASSIUM CHLORIDE 20 MEQ
20 PACKET (EA) ORAL ONCE
Qty: 0 | Refills: 0 | Status: COMPLETED | OUTPATIENT
Start: 2017-02-08 | End: 2017-02-08

## 2017-02-08 RX ORDER — ALBUTEROL 90 UG/1
2.5 AEROSOL, METERED ORAL EVERY 6 HOURS
Qty: 0 | Refills: 0 | Status: DISCONTINUED | OUTPATIENT
Start: 2017-02-08 | End: 2017-02-15

## 2017-02-08 RX ORDER — IPRATROPIUM/ALBUTEROL SULFATE 18-103MCG
3 AEROSOL WITH ADAPTER (GRAM) INHALATION EVERY 6 HOURS
Qty: 0 | Refills: 0 | Status: DISCONTINUED | OUTPATIENT
Start: 2017-02-08 | End: 2017-02-08

## 2017-02-08 RX ADMIN — Medication: at 14:30

## 2017-02-08 RX ADMIN — PANTOPRAZOLE SODIUM 40 MILLIGRAM(S): 20 TABLET, DELAYED RELEASE ORAL at 17:57

## 2017-02-08 RX ADMIN — Medication 25 MILLIGRAM(S): at 08:47

## 2017-02-08 RX ADMIN — Medication 60 MILLIGRAM(S): at 23:52

## 2017-02-08 RX ADMIN — CEFTRIAXONE 100 GRAM(S): 500 INJECTION, POWDER, FOR SOLUTION INTRAMUSCULAR; INTRAVENOUS at 23:49

## 2017-02-08 RX ADMIN — Medication: at 23:50

## 2017-02-08 RX ADMIN — INSULIN GLARGINE 5 UNIT(S): 100 INJECTION, SOLUTION SUBCUTANEOUS at 23:50

## 2017-02-08 RX ADMIN — Medication 60 MILLIGRAM(S): at 17:56

## 2017-02-08 RX ADMIN — AZITHROMYCIN 255 MILLIGRAM(S): 500 TABLET, FILM COATED ORAL at 17:57

## 2017-02-08 RX ADMIN — Medication 20 MILLIEQUIVALENT(S): at 13:46

## 2017-02-08 RX ADMIN — PANTOPRAZOLE SODIUM 40 MILLIGRAM(S): 20 TABLET, DELAYED RELEASE ORAL at 05:34

## 2017-02-08 RX ADMIN — Medication: at 10:19

## 2017-02-08 NOTE — CONSULT NOTE ADULT - ASSESSMENT
Bilateral air space and nodular infiltrates, upper lung zone predominant, with small eff  sig anemia, denies any overt hemoptysis  no active GI bleed, reported nausea /vomiting ? aspiration  Given anemia differential includes, alveolar hemorrhage syndromes ( vasculitis,GBS, SLE, idiopathic, MS  needs echo, ID eval ( calcified nodes on CT scan c/w prior granulomatous disease  serologies, esr, UA, iv derol, abx  02, keep sp02 > 90%  monitored bed, continuous sp02 Bilateral air space and nodular infiltrates, upper lung zone predominant, with small eff  sig anemia, denies any overt hemoptysis  no active GI bleed, reported nausea /vomiting ? aspiration  Given anemia differential includes, alveolar hemorrhage syndromes ( vasculitis,GBS, SLE, idiopathic, MS)  pt received transfusion aloes TRALI ( timing of CXR)  needs echo, ID eval ( calcified nodes on CT scan c/w prior granulomatous disease  serologies, esr, UA, iv derol, abx  02, keep sp02 > 90%  monitored bed, continuous sp02 Bilateral air space and nodular infiltrates, upper lung zone predominant, with small eff  sig anemia, denies any overt hemoptysis, non toxic, no acute distress  no active GI bleed, reported nausea /vomiting ? aspiration, pt received transfusion ?  TRALI ( CXR post dates transfusion)  Given anemia differential includes, alveolar hemorrhage syndromes ( vasculitis,GBS, SLE, idiopathic, MS)  needs echo, ID eval ( calcified nodes on CT scan c/w prior granulomatous disease  serologies, esr, UA, iv derol, abx  02, keep sp02 > 90%  monitored bed, continuous sp02

## 2017-02-08 NOTE — PROGRESS NOTE ADULT - SUBJECTIVE AND OBJECTIVE BOX
INTERVAL HPI/OVERNIGHT EVENTS:  HPI:  The patient is a 56 year old female with history of diabetes mellitus type 2 on insulin who presented to the ER from the office of her PMD. Seen and examined with a  at the bedside. According to the patient for the past 2 months she has had complaints of vomiting atleast 2-3 times a day associated with generalized abdominal pain. Her symptoms have progressively worsened and she has had associated weakness. She uses lantus at home for her diabetes and says she is compliant. She went to her PMD today with complaints of weakness and dark stool. Her hemoglobin was noted to be 5gm/dl therefore she was sent to the ER. In the ER hb is 6.7gm/dl with positive melena and guaic positive. Also noted to be hyperglycemic with hyponatremia given 2 liters of normal saline with insulin. (06 Feb 2017 19:02)        Patient is a 56y old  Female who presents with a chief complaint of dark stools (07 Feb 2017 11:01)    56y Female complaining of (...)      MEDICATIONS  (STANDING):  insulin glargine Injectable (LANTUS) 5Unit(s) SubCutaneous at bedtime  pantoprazole  Injectable 40milliGRAM(s) IV Push every 12 hours  insulin lispro (HumaLOG) corrective regimen sliding scale  SubCutaneous three times a day before meals  dextrose 5%. 1000milliLiter(s) IV Continuous <Continuous>  dextrose 50% Injectable 12.5Gram(s) IV Push once  dextrose 50% Injectable 25Gram(s) IV Push once  dextrose 50% Injectable 25Gram(s) IV Push once  sodium chloride 0.9%. 1000milliLiter(s) IV Continuous <Continuous>    MEDICATIONS  (PRN):  ondansetron Injectable 4milliGRAM(s) IV Push every 6 hours PRN Nausea  dextrose Gel 1Dose(s) Oral once PRN Blood Glucose LESS THAN 70 milliGRAM(s)/deciliter  glucagon  Injectable 1milliGRAM(s) IntraMuscular once PRN Glucose LESS THAN 70 milligrams/deciliter  hydrALAZINE 25milliGRAM(s) Oral every 6 hours PRN Systolic blood pressure >160      Allergies    No Known Allergies    Intolerances            Vital Signs Last 24 Hrs  T(C): 36.5, Max: 36.7 (02-07 @ 19:02)  T(F): 97.7, Max: 98 (02-07 @ 19:02)  HR: 82 (77 - 88)  BP: 161/76 (161/76 - 184/97)  BP(mean): --  RR: 18 (18 - 18)  SpO2: 90% (90% - 93%)    PHYSICAL EXAM:   · CONSTITUTIONAL: Well appearing, well nourished, awake, alert, oriented to person, place, time/situation and in no apparent distress.  · ENMT: Airway patent, . Mouth with normal mucosa.  · EYES: Clear bilaterally, pupils equal, round and reactive to light.  · CARDIAC: Normal rate, regular rhythm.  Heart sounds S1, S2.  No murmurs, rubs or gallops.  · RESPIRATORY: Breath sounds clear and equal bilaterally.  · GASTROINTESTINAL: Abdomen soft, non-tender, no guarding.  · MUSCULOSKELETAL: Spine appears normal, range of motion is not limited, no muscle or joint tenderness  · NEUROLOGICAL: Alert and oriented, no focal deficits, no motor or sensory deficits.  · SKIN: Skin normal color for race, warm, dry and intact. No evidence of rash.        LABS:                          10.1   11.48 )-----------( 652      ( 08 Feb 2017 07:05 )             29.2     08 Feb 2017 07:05    127    |  95     |  20.0   ----------------------------<  281    3.3     |  18.0   |  1.17     Ca    8.5        08 Feb 2017 07:05  Phos  4.0       06 Feb 2017 13:01  Mg     2.1       07 Feb 2017 07:58    TPro  6.8    /  Alb  2.1    /  TBili  1.1    /  DBili  x      /  AST  10     /  ALT  7      /  AlkPhos  86     07 Feb 2017 07:58    PT/INR - ( 06 Feb 2017 13:01 )   PT: 11.6 sec;   INR: 1.05 ratio         PTT - ( 06 Feb 2017 13:01 )  PTT:29.4 sec      CULTURES:    Problem/Plan - 1:  ·  Problem: Anemia, unspecified type.  Plan: Stable status post transfusion 2 units prbc  + guaic- RUle out GI bleed  Monitor cbc  Follow up stool for occult blood.     Problem/Plan - 2:  ·  Problem: Type 2 diabetes mellitus with hyperglycemia, without long-term current use of insulin.  Plan: Lantus 5 units Qhs  Add pre meal humalog sliding scale  Uncontrolled DM.     Severe anemia.  Alleged chronic black stool with negative colonoscopy 2 yrs ago.  R/o PUD,  Gastric Neoplasia or Gastric motility disorder, or erosive esophagitis.      Problem/Recommendation - 1:  Problem: Anemia, unspecified type. Recommendation: Transfuse to Hgb> 7.  Stool for OB.  Iron studies.  Check CXR.  Eventual EGD. Procedure explained to daughter at bedside who understands and agrees to proceed.    Problem/Recommendation - 2:  ·  Problem: Abdominal pain of unknown cause.  Recommendation: Consider for CT scan A/P with IV contrast when BUN and Cr normalize.  Check amylase/ Lipase.     Problem/Recommendation - 3:  ·  Problem: Type 2 diabetes mellitus with hyperglycemia, without long-term current use of insulin.  Recommendation: Needs better co    Problem: Anemia, unspecified type.  Plan: Iron studies ordered. Eventual EGD when hyperglycemia resolved. IV Pantoprazole ordered. Consider diet advancement if tolerated. Repeat labs ordered.     Problem/Plan - 2:  ·  Problem: Abdominal pain of unknown cause.  Plan: Eventual EGD when pt. medically optimized which she presently is not. Repaeat labs ordered. INTERVAL HPI/OVERNIGHT EVENTS:  HPI:  The patient is a 56 year old female with history of diabetes mellitus type 2 on insulin who presented to the ER from the office of her PMD. Seen and examined with a  at the bedside. According to the patient for the past 2 months she has had complaints of vomiting atleast 2-3 times a day associated with generalized abdominal pain. Her symptoms have progressively worsened and she has had associated weakness. She uses lantus at home for her diabetes and says she is compliant. She went to her PMD today with complaints of weakness and dark stool. Her hemoglobin was noted to be 5gm/dl therefore she was sent to the ER. In the ER hb is 6.7gm/dl with positive melena and guaic positive. Also noted to be hyperglycemic with hyponatremia given 2 liters of normal saline with insulin. (06 Feb 2017 19:02)        Patient is a 56y old  Female who presents with a chief complaint of dark stools (07 Feb 2017 11:01)    56y Female complaining of (...)      MEDICATIONS  (STANDING):  insulin glargine Injectable (LANTUS) 5Unit(s) SubCutaneous at bedtime  pantoprazole  Injectable 40milliGRAM(s) IV Push every 12 hours  insulin lispro (HumaLOG) corrective regimen sliding scale  SubCutaneous three times a day before meals  dextrose 5%. 1000milliLiter(s) IV Continuous <Continuous>  dextrose 50% Injectable 12.5Gram(s) IV Push once  dextrose 50% Injectable 25Gram(s) IV Push once  dextrose 50% Injectable 25Gram(s) IV Push once  sodium chloride 0.9%. 1000milliLiter(s) IV Continuous <Continuous>    MEDICATIONS  (PRN):  ondansetron Injectable 4milliGRAM(s) IV Push every 6 hours PRN Nausea  dextrose Gel 1Dose(s) Oral once PRN Blood Glucose LESS THAN 70 milliGRAM(s)/deciliter  glucagon  Injectable 1milliGRAM(s) IntraMuscular once PRN Glucose LESS THAN 70 milligrams/deciliter  hydrALAZINE 25milliGRAM(s) Oral every 6 hours PRN Systolic blood pressure >160      Allergies    No Known Allergies    Intolerances            Vital Signs Last 24 Hrs  T(C): 36.5, Max: 36.7 (02-07 @ 19:02)  T(F): 97.7, Max: 98 (02-07 @ 19:02)  HR: 82 (77 - 88)  BP: 161/76 (161/76 - 184/97)  BP(mean): --  RR: 18 (18 - 18)  SpO2: 90% (90% - 93%)    PHYSICAL EXAM:   · CONSTITUTIONAL: Well appearing, well nourished, awake, alert, oriented to person, place, time/situation and in no apparent distress.  · ENMT: Airway patent, . Mouth with normal mucosa.  · EYES: Clear bilaterally, pupils equal, round and reactive to light.  · CARDIAC: Normal rate, regular rhythm.  Heart sounds S1, S2.  No murmurs, rubs or gallops.  · RESPIRATORY: Breath sounds clear and equal bilaterally.  · GASTROINTESTINAL: Abdomen soft, non-tender, no guarding.  · MUSCULOSKELETAL: Spine appears normal, range of motion is not limited, no muscle or joint tenderness  · NEUROLOGICAL: Alert and oriented, no focal deficits, no motor or sensory deficits.  · SKIN: Skin normal color for race, warm, dry and intact. No evidence of rash.        LABS:                          10.1   11.48 )-----------( 652      ( 08 Feb 2017 07:05 )             29.2     08 Feb 2017 07:05    127    |  95     |  20.0   ----------------------------<  281    3.3     |  18.0   |  1.17     Ca    8.5        08 Feb 2017 07:05  Phos  4.0       06 Feb 2017 13:01  Mg     2.1       07 Feb 2017 07:58    TPro  6.8    /  Alb  2.1    /  TBili  1.1    /  DBili  x      /  AST  10     /  ALT  7      /  AlkPhos  86     07 Feb 2017 07:58    PT/INR - ( 06 Feb 2017 13:01 )   PT: 11.6 sec;   INR: 1.05 ratio         PTT - ( 06 Feb 2017 13:01 )  PTT:29.4 sec      CULTURES:    Problem/Plan - 1:  ·  Problem: Anemia, unspecified type.  Plan: Stable status post transfusion 2 units prbc  + guaic- RUle out GI bleed  Monitor cbc  Follow up stool for occult blood.         Severe anemia.  Alleged chronic black stool with negative colonoscopy 2 yrs ago.  R/o PUD,  Gastric Neoplasia or Gastric motility disorder, or erosive esophagitis. INTERVAL HPI/OVERNIGHT EVENTS:  HPI:  The patient is a 56 year old female with history of diabetes mellitus type 2 on insulin who presented to the ER from the office of her PMD. Seen and examined with a  at the bedside. According to the patient for the past 2 months she has had complaints of vomiting atleast 2-3 times a day associated with generalized abdominal pain. Her symptoms have progressively worsened and she has had associated weakness. She uses lantus at home for her diabetes and says she is compliant. She went to her PMD today with complaints of weakness and dark stool. Her hemoglobin was noted to be 5gm/dl therefore she was sent to the ER. In the ER hb is 6.7gm/dl with positive melena and guaic positive. Also noted to be hyperglycemic with hyponatremia given 2 liters of normal saline with insulin. (06 Feb 2017 19:02)        Patient is a 56y old  Female who presents with a chief complaint of dark stools (07 Feb 2017 11:01)    56y Female complaining of vomiting      MEDICATIONS  (STANDING):  insulin glargine Injectable (LANTUS) 5Unit(s) SubCutaneous at bedtime  pantoprazole  Injectable 40milliGRAM(s) IV Push every 12 hours  insulin lispro (HumaLOG) corrective regimen sliding scale  SubCutaneous three times a day before meals  dextrose 5%. 1000milliLiter(s) IV Continuous <Continuous>  dextrose 50% Injectable 12.5Gram(s) IV Push once  dextrose 50% Injectable 25Gram(s) IV Push once  dextrose 50% Injectable 25Gram(s) IV Push once  sodium chloride 0.9%. 1000milliLiter(s) IV Continuous <Continuous>    MEDICATIONS  (PRN):  ondansetron Injectable 4milliGRAM(s) IV Push every 6 hours PRN Nausea  dextrose Gel 1Dose(s) Oral once PRN Blood Glucose LESS THAN 70 milliGRAM(s)/deciliter  glucagon  Injectable 1milliGRAM(s) IntraMuscular once PRN Glucose LESS THAN 70 milligrams/deciliter  hydrALAZINE 25milliGRAM(s) Oral every 6 hours PRN Systolic blood pressure >160      Allergies    No Known Allergies    Intolerances            Vital Signs Last 24 Hrs  T(C): 36.5, Max: 36.7 (02-07 @ 19:02)  T(F): 97.7, Max: 98 (02-07 @ 19:02)  HR: 82 (77 - 88)  BP: 161/76 (161/76 - 184/97)  BP(mean): --  RR: 18 (18 - 18)  SpO2: 90% (90% - 93%)    PHYSICAL EXAM:   · CONSTITUTIONAL: Well appearing, well nourished, awake, alert, oriented to person, place, time/situation and in no apparent distress.  · ENMT: Airway patent, . Mouth with normal mucosa.  · EYES: Clear bilaterally, pupils equal, round and reactive to light.  · CARDIAC: Normal rate, regular rhythm.  Heart sounds S1, S2.  No murmurs, rubs or gallops.  · RESPIRATORY: Breath sounds clear and equal bilaterally.  · GASTROINTESTINAL: Abdomen soft, non-tender, no guarding.  · MUSCULOSKELETAL: Spine appears normal, range of motion is not limited, no muscle or joint tenderness  · NEUROLOGICAL: Alert and oriented, no focal deficits, no motor or sensory deficits.  · SKIN: Skin normal color for race, warm, dry and intact. No evidence of rash.        LABS:                          10.1   11.48 )-----------( 652      ( 08 Feb 2017 07:05 )             29.2     08 Feb 2017 07:05    127    |  95     |  20.0   ----------------------------<  281    3.3     |  18.0   |  1.17     Ca    8.5        08 Feb 2017 07:05  Phos  4.0       06 Feb 2017 13:01  Mg     2.1       07 Feb 2017 07:58    TPro  6.8    /  Alb  2.1    /  TBili  1.1    /  DBili  x      /  AST  10     /  ALT  7      /  AlkPhos  86     07 Feb 2017 07:58    PT/INR - ( 06 Feb 2017 13:01 )   PT: 11.6 sec;   INR: 1.05 ratio         PTT - ( 06 Feb 2017 13:01 )  PTT:29.4 sec      CULTURES:

## 2017-02-08 NOTE — PROGRESS NOTE ADULT - PROBLEM SELECTOR PLAN 1
Pt with anemia. Does not appear to be actively bleeding. H+H stable. CXR with consolidation - PNA vs hemorrhage. Would hold off on EGD and call pulmonary for evalation/clearance. Will need chest CT ( to be ordered by primary MD)

## 2017-02-08 NOTE — PROGRESS NOTE ADULT - SUBJECTIVE AND OBJECTIVE BOX
Patient is a 56y old  Female who presents with a chief complaint of dark stools (07 Feb 2017 11:01)      HPI: NO melena overnight ( had brown stool on rectal exam yesterday) . Tolerated solid diet yesterday.  Patient states she has cough with brown sputum. The CXR showed B/S consolidation vs hemorrhage. No fevers overnight.. used    REVIEW OF SYSTEMS:  Constitutional: No fever, weight loss or fatigue  ENMT:  No difficulty hearing, tinnitus, vertigo; No sinus or throat pain  Respiratory: + cough, wheezing, chills or hemoptysis  Cardiovascular: No chest pain, palpitations, dizziness or leg swelling  Gastrointestinal: No abdominal or epigastric pain. No nausea, vomiting or hematemesis; No diarrhea or constipation. No melena or hematochezia.  Skin: No itching, burning, rashes or lesions   Musculoskeletal: No joint pain or swelling; No muscle, back or extremity pain    PAST MEDICAL & SURGICAL HISTORY:  DM (diabetes mellitus)  No significant past surgical history      FAMILY HISTORY:  No pertinent family history in first degree relatives      SOCIAL HISTORY:  Smoking Status: [ ] Current, [ ] Former, [ ] Never  Pack Years:  [  ] EtOH  [  ] IVDA    MEDICATIONS:  MEDICATIONS  (STANDING):  insulin glargine Injectable (LANTUS) 5Unit(s) SubCutaneous at bedtime  pantoprazole  Injectable 40milliGRAM(s) IV Push every 12 hours  insulin lispro (HumaLOG) corrective regimen sliding scale  SubCutaneous three times a day before meals  dextrose 5%. 1000milliLiter(s) IV Continuous <Continuous>  dextrose 50% Injectable 12.5Gram(s) IV Push once  dextrose 50% Injectable 25Gram(s) IV Push once  dextrose 50% Injectable 25Gram(s) IV Push once  sodium chloride 0.9%. 1000milliLiter(s) IV Continuous <Continuous>    MEDICATIONS  (PRN):  ondansetron Injectable 4milliGRAM(s) IV Push every 6 hours PRN Nausea  dextrose Gel 1Dose(s) Oral once PRN Blood Glucose LESS THAN 70 milliGRAM(s)/deciliter  glucagon  Injectable 1milliGRAM(s) IntraMuscular once PRN Glucose LESS THAN 70 milligrams/deciliter  hydrALAZINE 25milliGRAM(s) Oral every 6 hours PRN Systolic blood pressure >160      Allergies    No Known Allergies    Intolerances        Vital Signs Last 24 Hrs  T(C): 36.5, Max: 36.7 (02-07 @ 19:02)  T(F): 97.7, Max: 98 (02-07 @ 19:02)  HR: 88 (77 - 88)  BP: 166/78 (166/78 - 184/97)  BP(mean): --  RR: 18 (18 - 18)  SpO2: 88% (88% - 93%)    I & Os for current day (as of 02-08 @ 08:28)  =============================================  IN: 955 ml / OUT: 0 ml / NET: 955 ml        PHYSICAL EXAM:    General: Well developed; well nourished; in no acute distress  HEENT: MMM, conjunctiva and sclera clear  Lung- decreased breath sound B/L  Gastrointestinal: Soft, non-tender non-distended; Normal bowel sounds; No rebound or guarding  Extremities: Normal range of motion, No clubbing, cyanosis or edema  Neurological: Alert and oriented x3  Skin: Warm and dry. No obvious rash      LABS:                        10.1   11.48 )-----------( 652      ( 08 Feb 2017 07:05 )             29.2     08 Feb 2017 07:05    127    |  95     |  20.0   ----------------------------<  281    3.3     |  18.0   |  1.17     Ca    8.5        08 Feb 2017 07:05  Phos  4.0       06 Feb 2017 13:01  Mg     2.1       07 Feb 2017 07:58    TPro  6.8    /  Alb  2.1    /  TBili  1.1    /  DBili  x      /  AST  10     /  ALT  7      /  AlkPhos  86     / Amylase 48     /Lipase 14     07 Feb 2017 07:58              RADIOLOGY & ADDITIONAL STUDIES:

## 2017-02-08 NOTE — PROGRESS NOTE ADULT - ASSESSMENT
Assesment/Plan:   Problem/Plan - 1:  Type 2 diabetes mellitus with hyperglycemia -  Plan: Lantus 5 units Qhs  Add pre meal humalog sliding scale  Uncontrolled DM.               DM (diabetes mellitus)  Anemia  Anemia  Hyponatremia  Type 2 diabetes mellitus with hyperglycemia, without long-term current use of insulin  Abdominal pain of unknown cause  Anemia, unspecified type  No significant past surgical history  WEAK VOMITTING DIZZY  GI bleed  1. Acute blood loss anemia likely secondary to GI bleed- IV fluids, npo, IV protonix, monitor cbc q6 hours, GI Dr Nunez consulted.   2. Hyperglycemia: IV hydration, start lantus at bedtime. Repeat BMP  check hba1c  3. Hyponatremia: Corrected for hyperglycemia is 134meq  No VTE given GI bleed Assesment/Plan:   Problem/Plan - 1:  Type 2 diabetes mellitus with hyperglycemia -  Plan: Lantus 5 units Qhs  Add pre meal humalog sliding scale  Uncontrolled DM.   Problem/Plan - 2: ·  Problem: Abdominal pain of unknown cause; possibly gastroparesis.  Plan: Eventual EGD when pt. medically optimized   which she presently is not. Consider for CT scan A/P with IV contrast when BUN and Cr normalize.  Check amylase/ Lipase.     Problem/Recommendation - 3  Anemia -  Plan: Stable status post transfusion 2 units prbc.  Acute blood loss anemia likely secondary to GI bleed- IV fluids, npo, IV protonix, monitor cbc q6 hours, GI consulted. Awaiting pulmonary clearance per GI for CT study, hold EGD for now  (Severe anemia.  Alleged chronic black stool with negative colonoscopy 2 yrs ago.  R/o PUD,  Gastric Neoplasia or Gastric motility disorder, or erosive esophagitis)    Monitor cbc    3. Hyponatremia: Corrected for hyperglycemia is 134meq  No VTE given GI bleed Assesment/Plan:   Problem/Plan - 1:  Type 2 diabetes mellitus with hyperglycemia -  Plan: Increase  Lantus to 10 units Qhs  Add pre meal humalog sliding scale  Uncontrolled DM.   Problem/Plan - 2: ·  Problem: Abdominal pain of unknown cause; possibly gastroparesis.  Plan: Eventual EGD when pt. medically optimized   which she presently is not. Consider for CT scan A/P with IV contrast when BUN and Cr normalize.  Check amylase/ Lipase.     Problem/Recommendation - 3  Anemia -  Plan: Stable status post transfusion 2 units prbc.  Acute blood loss anemia likely secondary to GI bleed- IV fluids, npo, IV protonix, monitor cbc q6 hours, GI consulted. Awaiting pulmonary clearance per GI for CT study, hold EGD for now  (Severe anemia.  Alleged chronic black stool with negative colonoscopy 2 yrs ago.  R/o PUD,  Gastric Neoplasia or Gastric motility disorder, or erosive esophagitis)  CT chest ordered for continued hypoxia, patient asymptomatic.   Diet ordered for now.     Monitor cbc    3. Hyponatremia: Corrected for hyperglycemia is 134meq  No VTE given GI bleed

## 2017-02-08 NOTE — CONSULT NOTE ADULT - SUBJECTIVE AND OBJECTIVE BOX
PULMONARY CONSULT NOTE      FRANNIE MORALESEARNEST-[id number]    Patient is a 56y old  Female who presents with a chief complaint of dark stools (07 Feb 2017 11:01)  The patient is a 56 year old female with history of diabetes mellitus type 2 on insulin who presented to the ER from the office of her PMD. Seen and examined with a  at the bedside. According to the patient for the past 2 months she has had complaints of vomiting atleast 2-3 times a day associated with generalized abdominal pain. Her symptoms have progressively worsened and she has had associated weakness. She uses lantus at home for her diabetes and says she is compliant. She went to her PMD today with complaints of weakness and dark stool. Her hemoglobin was noted to be 5gm/dl therefore she was sent to the ER. In the ER hb is 6.7gm/dl with positive melena and guaic positive. Also noted to be hyperglycemic with hyponatremia given 2 liters of normal saline with insulin. (06 Feb 2017 19:02)        HISTORY OF PRESENT ILLNESS:    MEDICATIONS  (STANDING):  insulin glargine Injectable (LANTUS) 5Unit(s) SubCutaneous at bedtime  pantoprazole  Injectable 40milliGRAM(s) IV Push every 12 hours  insulin lispro (HumaLOG) corrective regimen sliding scale  SubCutaneous three times a day before meals  dextrose 5%. 1000milliLiter(s) IV Continuous <Continuous>  dextrose 50% Injectable 12.5Gram(s) IV Push once  dextrose 50% Injectable 25Gram(s) IV Push once  dextrose 50% Injectable 25Gram(s) IV Push once  sodium chloride 0.9%. 1000milliLiter(s) IV Continuous <Continuous>  methylPREDNISolone sodium succinate Injectable 40milliGRAM(s) IV Push every 8 hours  ALBUTerol/ipratropium for Nebulization 3milliLiter(s) Nebulizer every 6 hours  azithromycin  IVPB  IV Intermittent   cefTRIAXone   IVPB  IV Intermittent   azithromycin  IVPB 500milliGRAM(s) IV Intermittent once  cefTRIAXone   IVPB 1Gram(s) IV Intermittent once      MEDICATIONS  (PRN):  ondansetron Injectable 4milliGRAM(s) IV Push every 6 hours PRN Nausea  dextrose Gel 1Dose(s) Oral once PRN Blood Glucose LESS THAN 70 milliGRAM(s)/deciliter  glucagon  Injectable 1milliGRAM(s) IntraMuscular once PRN Glucose LESS THAN 70 milligrams/deciliter  hydrALAZINE 25milliGRAM(s) Oral every 6 hours PRN Systolic blood pressure >160      Allergies    No Known Allergies    Intolerances        PAST MEDICAL & SURGICAL HISTORY:  DM (diabetes mellitus)  No significant past surgical history      FAMILY HISTORY:  No pertinent family history in first degree relatives      SOCIAL HISTORY  Smoking History:     REVIEW OF SYSTEMS:    CONSTITUTIONAL:  No fevers, chills, sweats    HEENT:  Eyes:  No diplopia or blurred vision. ENT:  No earache, sore throat or runny nose.    CARDIOVASCULAR:  No pressure, squeezing, tightness, or heaviness about the chest; no palpitations.    RESPIRATORY:  No cough, shortness of breath, PND or orthopnea. Mild SOBOE    GASTROINTESTINAL:  No abdominal pain, nausea, vomiting or diarrhea.    GENITOURINARY:  No dysuria, frequency or urgency.    NEUROLOGIC:  No paresthesias, fasciculations, seizures or weakness.    PSYCHIATRIC:  No disorder of thought or mood.    Vital Signs Last 24 Hrs  T(C): 36.7, Max: 36.7 (02-07 @ 19:02)  T(F): 98.1, Max: 98.1 (02-08 @ 16:39)  HR: 94 (82 - 94)  BP: 168/96 (152/70 - 184/97)  BP(mean): --  RR: 16 (16 - 18)  SpO2: 90% (90% - 92%)    PHYSICAL EXAMINATION:    GENERAL: The patient is a well-developed, well-nourished _____in no apparent distress.     HEENT: Head is normocephalic and atraumatic. Extraocular muscles are intact. Mucous membranes are moist.     NECK: Supple.     LUNGS: Clear to auscultation without wheezing, rales, or rhonchi. Respirations unlabored    HEART: Regular rate and rhythm without murmur.    ABDOMEN: Soft, nontender, and nondistended.  No hepatosplenomegaly is noted.    EXTREMITIES: Without any cyanosis, clubbing, rash, lesions or edema.    NEUROLOGIC: Grossly intact.      LABS:                        10.1   11.48 )-----------( 652      ( 08 Feb 2017 07:05 )             29.2     08 Feb 2017 07:05    127    |  95     |  20.0   ----------------------------<  281    3.3     |  18.0   |  1.17     Ca    8.5        08 Feb 2017 07:05  Mg     2.1       07 Feb 2017 07:58    TPro  6.8    /  Alb  2.1    /  TBili  1.1    /  DBili  x      /  AST  10     /  ALT  7      /  AlkPhos  86     07 Feb 2017 07:58                        MICROBIOLOGY:    RADIOLOGY & ADDITIONAL STUDIES:  CXR and CT scan reviewed      INTERPRETATION:  Clinical information: Dyspnea    Comparison: Multiple radiographs the last on 2/7    PROCEDURE:   CT of the Chest was performed without intravenous contrast.    FINDINGS:    CHEST:    CHEST WALL AND LOWER NECK: Within normal limits  MEDIASTINUM AND YOLY: Mildly enlarged mediastinal lymph nodes up to 1.4   cm, nonspecific.  HEART: Within normal limits  VESSELS: Unopacified great vessels are unremarkable.  LUNG AND LARGE AIRWAYS: Diffuse bilateral airspace opacities. No   endobronchial mass. Small bilateral pleural effusions. No    UPPER ABDOMEN: Left renal cyst, subcutaneous soft tissue edema.    MUSCULOSKELETAL: Within normal limits    IMPRESSION:     Diffuse bilateral alveolar airspace opacities and small pleural effusion,   nonspecific. The differential includes ARDS, pneumonia including   atypical, if immunocompromise PCP pneumonia, pulmonary hemorrhage and   other inflammatory pneumonitis.                PAOLO PRICE M.D., ATTENDING RADIOLOGIST  This document has been electronically signed. Feb 8 2017  2:19PM PULMONARY CONSULT NOTE      FRANNIE MORALESEARNEST-[id number]    Patient is a 56y old  Female who presents with a chief complaint of dark stools (07 Feb 2017 11:01)  The patient is a 56 year old female with history of diabetes mellitus type 2 on insulin who presented to the ER from the office of her PMD. Seen and examined with a  at the bedside. According to the patient for the past 2 months she has had complaints of vomiting atleast 2-3 times a day associated with generalized abdominal pain. Her symptoms have progressively worsened and she has had associated weakness. She uses lantus at home for her diabetes and says she is compliant. She went to her PMD today with complaints of weakness and dark stool. Her hemoglobin was noted to be 5gm/dl therefore she was sent to the ER. In the ER hb is 6.7gm/dl with positive melena and guaic positive. Also noted to be hyperglycemic with hyponatremia given 2 liters of normal saline with insulin. (06 Feb 2017 19:02)        HISTORY OF PRESENT ILLNESS:  hx through , no sob or hemoptysis  does not feel ill  no sig cough  denies hx TB, recent travel [facility] 2 months ago  no pets  melena x 2 months per pt  MEDICATIONS  (STANDING):  insulin glargine Injectable (LANTUS) 5Unit(s) SubCutaneous at bedtime  pantoprazole  Injectable 40milliGRAM(s) IV Push every 12 hours  insulin lispro (HumaLOG) corrective regimen sliding scale  SubCutaneous three times a day before meals  dextrose 5%. 1000milliLiter(s) IV Continuous <Continuous>  dextrose 50% Injectable 12.5Gram(s) IV Push once  dextrose 50% Injectable 25Gram(s) IV Push once  dextrose 50% Injectable 25Gram(s) IV Push once  sodium chloride 0.9%. 1000milliLiter(s) IV Continuous <Continuous>  methylPREDNISolone sodium succinate Injectable 40milliGRAM(s) IV Push every 8 hours  ALBUTerol/ipratropium for Nebulization 3milliLiter(s) Nebulizer every 6 hours  azithromycin  IVPB  IV Intermittent   cefTRIAXone   IVPB  IV Intermittent   azithromycin  IVPB 500milliGRAM(s) IV Intermittent once  cefTRIAXone   IVPB 1Gram(s) IV Intermittent once      MEDICATIONS  (PRN):  ondansetron Injectable 4milliGRAM(s) IV Push every 6 hours PRN Nausea  dextrose Gel 1Dose(s) Oral once PRN Blood Glucose LESS THAN 70 milliGRAM(s)/deciliter  glucagon  Injectable 1milliGRAM(s) IntraMuscular once PRN Glucose LESS THAN 70 milligrams/deciliter  hydrALAZINE 25milliGRAM(s) Oral every 6 hours PRN Systolic blood pressure >160      Allergies    No Known Allergies    Intolerances        PAST MEDICAL & SURGICAL HISTORY:  DM (diabetes mellitus)  No significant past surgical history      FAMILY HISTORY:  No pertinent family history in first degree relatives      SOCIAL HISTORY  Smoking History:     REVIEW OF SYSTEMS:    CONSTITUTIONAL:  No fevers, chills, sweats    HEENT:  Eyes:  No diplopia or blurred vision. ENT:  No earache, sore throat or runny nose.    CARDIOVASCULAR:  No pressure, squeezing, tightness, or heaviness about the chest; no palpitations.    RESPIRATORY:  No cough, shortness of breath, PND or orthopnea. Mild SOBOE    GASTROINTESTINAL:  per HPI    GENITOURINARY:  No dysuria, frequency or urgency.    NEUROLOGIC:  No paresthesias, fasciculations, seizures or weakness.    PSYCHIATRIC:  No disorder of thought or mood.    Vital Signs Last 24 Hrs  T(C): 36.7, Max: 36.7 (02-07 @ 19:02)  T(F): 98.1, Max: 98.1 (02-08 @ 16:39)  HR: 94 (82 - 94)  BP: 168/96 (152/70 - 184/97)  BP(mean): --  RR: 16 (16 - 18)  SpO2: 90% (90% - 92%)    PHYSICAL EXAMINATION:    GENERAL: The patient is a well-developed, well-nourished in no apparent distress.     HEENT: Head is normocephalic and atraumatic. Extraocular muscles are intact. Mucous membranes are moist.     NECK: Supple.     LUNGS: Clear to auscultation without wheezing, rales, or rhonchi. Respirations unlabored    HEART: Regular rate and rhythm without murmur.    ABDOMEN: Soft, nontender, and nondistended.  No hepatosplenomegaly is noted.    EXTREMITIES: Without any cyanosis, clubbing, rash, lesions or edema.    NEUROLOGIC: Grossly intact.      LABS:                        10.1   11.48 )-----------( 652      ( 08 Feb 2017 07:05 )             29.2     08 Feb 2017 07:05    127    |  95     |  20.0   ----------------------------<  281    3.3     |  18.0   |  1.17     Ca    8.5        08 Feb 2017 07:05  Mg     2.1       07 Feb 2017 07:58    TPro  6.8    /  Alb  2.1    /  TBili  1.1    /  DBili  x      /  AST  10     /  ALT  7      /  AlkPhos  86     07 Feb 2017 07:58                        MICROBIOLOGY:    RADIOLOGY & ADDITIONAL STUDIES:  CXR and CT scan reviewed      INTERPRETATION:  Clinical information: Dyspnea    Comparison: Multiple radiographs the last on 2/7    PROCEDURE:   CT of the Chest was performed without intravenous contrast.    FINDINGS:    CHEST:    CHEST WALL AND LOWER NECK: Within normal limits  MEDIASTINUM AND YOLY: Mildly enlarged mediastinal lymph nodes up to 1.4   cm, nonspecific.  HEART: Within normal limits  VESSELS: Unopacified great vessels are unremarkable.  LUNG AND LARGE AIRWAYS: Diffuse bilateral airspace opacities. No   endobronchial mass. Small bilateral pleural effusions. No    UPPER ABDOMEN: Left renal cyst, subcutaneous soft tissue edema.    MUSCULOSKELETAL: Within normal limits    IMPRESSION:     Diffuse bilateral alveolar airspace opacities and small pleural effusion,   nonspecific. The differential includes ARDS, pneumonia including   atypical, if immunocompromise PCP pneumonia, pulmonary hemorrhage and   other inflammatory pneumonitis.                PAOLO PRICE M.D., ATTENDING RADIOLOGIST  This document has been electronically signed. Feb 8 2017  2:19PM

## 2017-02-09 DIAGNOSIS — R06.00 DYSPNEA, UNSPECIFIED: ICD-10-CM

## 2017-02-09 DIAGNOSIS — N17.9 ACUTE KIDNEY FAILURE, UNSPECIFIED: ICD-10-CM

## 2017-02-09 DIAGNOSIS — E11.9 TYPE 2 DIABETES MELLITUS WITHOUT COMPLICATIONS: ICD-10-CM

## 2017-02-09 LAB
ANION GAP SERPL CALC-SCNC: 21 MMOL/L — HIGH (ref 5–17)
APPEARANCE UR: CLEAR — SIGNIFICANT CHANGE UP
BACTERIA # UR AUTO: ABNORMAL
BILIRUB UR-MCNC: NEGATIVE — SIGNIFICANT CHANGE UP
BUN SERPL-MCNC: 27 MG/DL — HIGH (ref 8–20)
CALCIUM SERPL-MCNC: 8.5 MG/DL — LOW (ref 8.6–10.2)
CHLORIDE SERPL-SCNC: 91 MMOL/L — LOW (ref 98–107)
CO2 SERPL-SCNC: 16 MMOL/L — LOW (ref 22–29)
COLOR SPEC: YELLOW — SIGNIFICANT CHANGE UP
CREAT ?TM UR-MCNC: 83 MG/DL — SIGNIFICANT CHANGE UP
CREAT SERPL-MCNC: 1.33 MG/DL — HIGH (ref 0.5–1.3)
DIFF PNL FLD: ABNORMAL
EPI CELLS # UR: SIGNIFICANT CHANGE UP
GLUCOSE SERPL-MCNC: 372 MG/DL — HIGH (ref 70–115)
GLUCOSE UR QL: 1000 MG/DL
GRAN CASTS # UR COMP ASSIST: ABNORMAL /LPF
HCT VFR BLD CALC: 33.2 % — LOW (ref 37–47)
HGB BLD-MCNC: 11.3 G/DL — LOW (ref 12–16)
HYALINE CASTS # UR AUTO: ABNORMAL /LPF
KETONES UR-MCNC: ABNORMAL
LEUKOCYTE ESTERASE UR-ACNC: ABNORMAL
MAGNESIUM SERPL-MCNC: 1.8 MG/DL — SIGNIFICANT CHANGE UP (ref 1.8–2.5)
MCHC RBC-ENTMCNC: 27.5 PG — SIGNIFICANT CHANGE UP (ref 27–31)
MCHC RBC-ENTMCNC: 34 G/DL — SIGNIFICANT CHANGE UP (ref 32–36)
MCV RBC AUTO: 80.8 FL — LOW (ref 81–99)
NITRITE UR-MCNC: NEGATIVE — SIGNIFICANT CHANGE UP
NT-PROBNP SERPL-SCNC: 5278 PG/ML — HIGH (ref 0–300)
OSMOLALITY UR: 398 MOSM/KG — SIGNIFICANT CHANGE UP (ref 300–1000)
PH UR: 5 — SIGNIFICANT CHANGE UP (ref 4.8–8)
PLATELET # BLD AUTO: 546 K/UL — HIGH (ref 150–400)
POTASSIUM SERPL-MCNC: 4.3 MMOL/L — SIGNIFICANT CHANGE UP (ref 3.5–5.3)
POTASSIUM SERPL-SCNC: 4.3 MMOL/L — SIGNIFICANT CHANGE UP (ref 3.5–5.3)
PROT ?TM UR-MCNC: 407 MG/DL — SIGNIFICANT CHANGE UP (ref 0–12)
PROT UR-MCNC: 500 MG/DL
PROT/CREAT UR-RTO: 4.9 RATIO — HIGH
RBC # BLD: 4.11 M/UL — LOW (ref 4.4–5.2)
RBC # FLD: 14.7 % — SIGNIFICANT CHANGE UP (ref 11–15.6)
RBC CASTS # UR COMP ASSIST: ABNORMAL /HPF (ref 0–4)
SODIUM SERPL-SCNC: 128 MMOL/L — LOW (ref 135–145)
SODIUM UR-SCNC: 16 MMOL/L — SIGNIFICANT CHANGE UP
SP GR SPEC: 1.02 — SIGNIFICANT CHANGE UP (ref 1.01–1.02)
UROBILINOGEN FLD QL: NEGATIVE MG/DL — SIGNIFICANT CHANGE UP
WBC # BLD: 9.11 K/UL — SIGNIFICANT CHANGE UP (ref 4.8–10.8)
WBC # FLD AUTO: 9.11 K/UL — SIGNIFICANT CHANGE UP (ref 4.8–10.8)
WBC UR QL: ABNORMAL

## 2017-02-09 PROCEDURE — 76775 US EXAM ABDO BACK WALL LIM: CPT | Mod: 26

## 2017-02-09 PROCEDURE — 99233 SBSQ HOSP IP/OBS HIGH 50: CPT

## 2017-02-09 RX ORDER — HEPARIN SODIUM 5000 [USP'U]/ML
5000 INJECTION INTRAVENOUS; SUBCUTANEOUS EVERY 12 HOURS
Qty: 0 | Refills: 0 | Status: DISCONTINUED | OUTPATIENT
Start: 2017-02-09 | End: 2017-02-15

## 2017-02-09 RX ORDER — INSULIN LISPRO 100/ML
5 VIAL (ML) SUBCUTANEOUS
Qty: 0 | Refills: 0 | Status: DISCONTINUED | OUTPATIENT
Start: 2017-02-09 | End: 2017-02-10

## 2017-02-09 RX ORDER — INSULIN GLARGINE 100 [IU]/ML
10 INJECTION, SOLUTION SUBCUTANEOUS AT BEDTIME
Qty: 0 | Refills: 0 | Status: DISCONTINUED | OUTPATIENT
Start: 2017-02-09 | End: 2017-02-10

## 2017-02-09 RX ADMIN — Medication 5 UNIT(S): at 18:01

## 2017-02-09 RX ADMIN — Medication 60 MILLIGRAM(S): at 12:14

## 2017-02-09 RX ADMIN — PANTOPRAZOLE SODIUM 40 MILLIGRAM(S): 20 TABLET, DELAYED RELEASE ORAL at 18:07

## 2017-02-09 RX ADMIN — Medication 60 MILLIGRAM(S): at 18:07

## 2017-02-09 RX ADMIN — SODIUM CHLORIDE 75 MILLILITER(S): 9 INJECTION INTRAMUSCULAR; INTRAVENOUS; SUBCUTANEOUS at 03:46

## 2017-02-09 RX ADMIN — INSULIN GLARGINE 10 UNIT(S): 100 INJECTION, SOLUTION SUBCUTANEOUS at 23:43

## 2017-02-09 RX ADMIN — AZITHROMYCIN 255 MILLIGRAM(S): 500 TABLET, FILM COATED ORAL at 18:56

## 2017-02-09 RX ADMIN — Medication 60 MILLIGRAM(S): at 23:45

## 2017-02-09 RX ADMIN — Medication 5: at 09:15

## 2017-02-09 RX ADMIN — PANTOPRAZOLE SODIUM 40 MILLIGRAM(S): 20 TABLET, DELAYED RELEASE ORAL at 05:25

## 2017-02-09 RX ADMIN — Medication 6: at 18:01

## 2017-02-09 RX ADMIN — Medication 60 MILLIGRAM(S): at 04:27

## 2017-02-09 RX ADMIN — CEFTRIAXONE 100 GRAM(S): 500 INJECTION, POWDER, FOR SOLUTION INTRAMUSCULAR; INTRAVENOUS at 18:06

## 2017-02-09 RX ADMIN — Medication 5: at 12:14

## 2017-02-09 RX ADMIN — HEPARIN SODIUM 5000 UNIT(S): 5000 INJECTION INTRAVENOUS; SUBCUTANEOUS at 18:07

## 2017-02-09 NOTE — PROGRESS NOTE ADULT - ASSESSMENT
bilateral upper lung zone predominant infiltrates with eff  remains on 50% 02  ? diffuse alveolar hemorrhage syndrome  ? Trali/?CHF component  ? AIP  doubt pna  echo noted, LV dysfunction noted, small pericardial effusion  d/c IVF , check BNP  cardiology input  renal input noted, UA, serologies pending

## 2017-02-09 NOTE — CONSULT NOTE ADULT - SUBJECTIVE AND OBJECTIVE BOX
Patient is a 56y old  Female who presents with a chief complaint of dark stools (07 Feb 2017 11:01)      HPI:  The patient is a 56 year old female with history of diabetes mellitus type 2 on insulin who presented to the ER from the office of her PMD. She was subsequently found to have severe anemia (requiring PRBCs) and evidence of GI blood loss.  Pt also noted to have SOB and renal insufficiency for which we are called.  Pt denies any known history of CRF; never has been referred to nephrology.  Prior to admission has noted several days of "blood" in the urine (light pink color).      PAST MEDICAL & SURGICAL HISTORY:  DM (diabetes mellitus)  No significant past surgical history      FAMILY HISTORY:  No pertinent family history in first degree relatives      Social History:    MEDICATIONS  (STANDING):  insulin glargine Injectable (LANTUS) 5Unit(s) SubCutaneous at bedtime  pantoprazole  Injectable 40milliGRAM(s) IV Push every 12 hours  insulin lispro (HumaLOG) corrective regimen sliding scale  SubCutaneous three times a day before meals  dextrose 5%. 1000milliLiter(s) IV Continuous <Continuous>  dextrose 50% Injectable 12.5Gram(s) IV Push once  dextrose 50% Injectable 25Gram(s) IV Push once  dextrose 50% Injectable 25Gram(s) IV Push once  sodium chloride 0.9%. 1000milliLiter(s) IV Continuous <Continuous>  azithromycin  IVPB  IV Intermittent   cefTRIAXone   IVPB  IV Intermittent   azithromycin  IVPB 500milliGRAM(s) IV Intermittent every 24 hours  cefTRIAXone   IVPB 1Gram(s) IV Intermittent every 24 hours  methylPREDNISolone sodium succinate Injectable 60milliGRAM(s) IV Push every 6 hours    MEDICATIONS  (PRN):  ondansetron Injectable 4milliGRAM(s) IV Push every 6 hours PRN Nausea  dextrose Gel 1Dose(s) Oral once PRN Blood Glucose LESS THAN 70 milliGRAM(s)/deciliter  glucagon  Injectable 1milliGRAM(s) IntraMuscular once PRN Glucose LESS THAN 70 milligrams/deciliter  hydrALAZINE 25milliGRAM(s) Oral every 6 hours PRN Systolic blood pressure >160  ALBUTerol    0.083% 2.5milliGRAM(s) Nebulizer every 6 hours PRN wheeze      Allergies    No Known Allergies    Intolerances        REVIEW OF SYSTEMS:    CONSTITUTIONAL: + weakness and fatigue  EYES: No eye pain, visual disturbances, or discharge  ENMT:  No difficulty hearing, tinnitus, vertigo; No sinus or throat pain  NECK: No pain or stiffness  RESPIRATORY: + cough and sob  CARDIOVASCULAR: No chest pain, palpitations, dizziness, or leg swelling  GASTROINTESTINAL: +abd pain and melena   GENITOURINARY: No dysuria; + hematuria  NEUROLOGICAL: No headaches, memory loss, loss of strength, numbness, or tremors  SKIN: No itching, burning, rashes, or lesions   MUSCULOSKELETAL: No joint pain or swelling; No muscle, back, or extremity pain  PSYCHIATRIC: No depression, anxiety, mood swings, or difficulty sleeping      Vital Signs Last 24 Hrs  T(C): 36.9, Max: 37.4 (02-08 @ 20:34)  T(F): 98.4, Max: 99.4 (02-08 @ 20:34)  HR: 90 (84 - 95)  BP: 148/76 (142/70 - 174/98)  BP(mean): --  RR: 18 (16 - 18)  SpO2: 90% (90% - 95%)    PHYSICAL EXAM:    GENERAL: +sob  HEAD:  Atraumatic, Normocephalic  EYES: EOMI, PERRLA, conjunctiva and sclera clear  ENMT: O2 mask  NECK: Supple, No JVD  NERVOUS SYSTEM:  Alert & Oriented X3,  intact and symmetric  CHEST/LUNG: Diminished BS with   HEART: Regular rate and rhythm; No murmurs, rubs, or gallops  ABDOMEN: Soft, +BS  EXTREMITIES:  2+ Peripheral Pulses, No clubbing, cyanosis, or edema  SKIN: No rashes or lesions      LABS:                        11.3   9.11  )-----------( 546      ( 09 Feb 2017 06:01 )             33.2     09 Feb 2017 06:01    128    |  91     |  27.0   ----------------------------<  372    4.3     |  16.0   |  1.33     Ca    8.5        09 Feb 2017 06:01  Mg     1.8       09 Feb 2017 06:01          Magnesium, Serum: 1.8 mg/dL (02-09 @ 06:01)      RADIOLOGY & ADDITIONAL TESTS:   EXAM:  CT CHEST                          PROCEDURE DATE:  02/08/2017        INTERPRETATION:  Clinical information: Dyspnea    Comparison: Multiple radiographs the last on 2/7    PROCEDURE:   CT of the Chest was performed without intravenous contrast.    FINDINGS:    CHEST:    CHEST WALL AND LOWER NECK: Within normal limits  MEDIASTINUM AND YOLY: Mildly enlarged mediastinal lymph nodes up to 1.4   cm, nonspecific.  HEART: Within normal limits  VESSELS: Unopacified great vessels are unremarkable.  LUNG AND LARGE AIRWAYS: Diffuse bilateral airspace opacities. No   endobronchial mass. Small bilateral pleural effusions. No    UPPER ABDOMEN: Left renal cyst, subcutaneous soft tissue edema.    MUSCULOSKELETAL: Within normal limits    IMPRESSION:     Diffuse bilateral alveolar airspace opacities and small pleural effusion,   nonspecific. The differential includes ARDS, pneumonia including   atypical, if immunocompromise PCP pneumonia, pulmonary hemorrhage and   other inflammatory pneumonitis.

## 2017-02-09 NOTE — PROGRESS NOTE ADULT - PROBLEM SELECTOR PLAN 1
Stable status post transfusion 2 units prbc  + guaic- RUle out GI bleed  Monitor cbc Stable status post transfusion 2 units prbc  No evidence of acute bleeding. EGD held until stable.

## 2017-02-09 NOTE — CONSULT NOTE ADULT - ASSESSMENT
1) ARF: r/o chronic component due to DM              ? vasculitis given constellation of symptoms and presentation               - check urinalysis               - check serologies               - monitor labs    2) Hyponatremia: etiology unclear==> corrects somewhat for hyperglycemia                - optimal glucose control                - check urine studies                - avoid excessive hypotonic fluids

## 2017-02-09 NOTE — PROGRESS NOTE ADULT - SUBJECTIVE AND OBJECTIVE BOX
INTERVAL HPI/OVERNIGHT EVENTS:    CC: Weakness, anemia    Patient seen and examined with  at the bedside,       Vital Signs Last 24 Hrs  T(C): 36.9, Max: 37.4 (02-08 @ 20:34)  T(F): 98.4, Max: 99.4 (02-08 @ 20:34)  HR: 90 (84 - 95)  BP: 148/76 (142/70 - 174/98)  BP(mean): --  RR: 18 (16 - 18)  SpO2: 90% (90% - 95%)    PHYSICAL EXAM:    GENERAL: NAD, aox3  HEAD:  Atraumatic, Normocephalic  EYES: EOMI, PERRLA, conjunctiva and sclera clear  ENMT: Moist mucous membranes  NECK: Supple, No JVD  CHEST/LUNG: Clear to auscultation bilaterally; No rales, rhonchi, wheezing, or rubs  HEART: Regular rate and rhythm; No murmurs, rubs, or gallops  ABDOMEN: Soft, Nontender, Nondistended; Bowel sounds present  EXTREMITIES:  2+ Peripheral Pulses, No clubbing, cyanosis, or edema        MEDICATIONS  (STANDING):  insulin glargine Injectable (LANTUS) 5Unit(s) SubCutaneous at bedtime  pantoprazole  Injectable 40milliGRAM(s) IV Push every 12 hours  insulin lispro (HumaLOG) corrective regimen sliding scale  SubCutaneous three times a day before meals  dextrose 5%. 1000milliLiter(s) IV Continuous <Continuous>  dextrose 50% Injectable 12.5Gram(s) IV Push once  dextrose 50% Injectable 25Gram(s) IV Push once  dextrose 50% Injectable 25Gram(s) IV Push once  sodium chloride 0.9%. 1000milliLiter(s) IV Continuous <Continuous>  azithromycin  IVPB  IV Intermittent   cefTRIAXone   IVPB  IV Intermittent   azithromycin  IVPB 500milliGRAM(s) IV Intermittent every 24 hours  cefTRIAXone   IVPB 1Gram(s) IV Intermittent every 24 hours  methylPREDNISolone sodium succinate Injectable 60milliGRAM(s) IV Push every 6 hours    MEDICATIONS  (PRN):  ondansetron Injectable 4milliGRAM(s) IV Push every 6 hours PRN Nausea  dextrose Gel 1Dose(s) Oral once PRN Blood Glucose LESS THAN 70 milliGRAM(s)/deciliter  glucagon  Injectable 1milliGRAM(s) IntraMuscular once PRN Glucose LESS THAN 70 milligrams/deciliter  hydrALAZINE 25milliGRAM(s) Oral every 6 hours PRN Systolic blood pressure >160  ALBUTerol    0.083% 2.5milliGRAM(s) Nebulizer every 6 hours PRN wheeze      Allergies    No Known Allergies    Intolerances          LABS:                          11.3   9.11  )-----------( 546      ( 09 Feb 2017 06:01 )             33.2     09 Feb 2017 06:01    128    |  91     |  27.0   ----------------------------<  372    4.3     |  16.0   |  1.33     Ca    8.5        09 Feb 2017 06:01  Mg     1.8       09 Feb 2017 06:01            RADIOLOGY & ADDITIONAL TESTS: INTERVAL HPI/OVERNIGHT EVENTS:    CC: Weakness, anemia    Patient seen and examined with  at the bedside, denies shortness of breath or cough. Denies nausea or vomiting. denies abdominal pain.       Vital Signs Last 24 Hrs  T(C): 36.9, Max: 37.4 (02-08 @ 20:34)  T(F): 98.4, Max: 99.4 (02-08 @ 20:34)  HR: 90 (84 - 95)  BP: 148/76 (142/70 - 174/98)  BP(mean): --  RR: 18 (16 - 18)  SpO2: 90% (90% - 95%)    PHYSICAL EXAM:    GENERAL: NAD, aox3  HEAD:  Atraumatic, Normocephalic  EYES: EOMI, PERRLA, conjunctiva and sclera clear  ENMT: Moist mucous membranes  NECK: Supple, No JVD  CHEST/LUNG: Bilateral rhonchi   HEART: Regular rate and rhythm; No murmurs, rubs, or gallops  ABDOMEN: Soft, Nontender, Nondistended; Bowel sounds present  EXTREMITIES:  2+ Peripheral Pulses, No clubbing, cyanosis, or edema        MEDICATIONS  (STANDING):  insulin glargine Injectable (LANTUS) 5Unit(s) SubCutaneous at bedtime  pantoprazole  Injectable 40milliGRAM(s) IV Push every 12 hours  insulin lispro (HumaLOG) corrective regimen sliding scale  SubCutaneous three times a day before meals  dextrose 5%. 1000milliLiter(s) IV Continuous <Continuous>  dextrose 50% Injectable 12.5Gram(s) IV Push once  dextrose 50% Injectable 25Gram(s) IV Push once  dextrose 50% Injectable 25Gram(s) IV Push once  sodium chloride 0.9%. 1000milliLiter(s) IV Continuous <Continuous>  azithromycin  IVPB  IV Intermittent   cefTRIAXone   IVPB  IV Intermittent   azithromycin  IVPB 500milliGRAM(s) IV Intermittent every 24 hours  cefTRIAXone   IVPB 1Gram(s) IV Intermittent every 24 hours  methylPREDNISolone sodium succinate Injectable 60milliGRAM(s) IV Push every 6 hours    MEDICATIONS  (PRN):  ondansetron Injectable 4milliGRAM(s) IV Push every 6 hours PRN Nausea  dextrose Gel 1Dose(s) Oral once PRN Blood Glucose LESS THAN 70 milliGRAM(s)/deciliter  glucagon  Injectable 1milliGRAM(s) IntraMuscular once PRN Glucose LESS THAN 70 milligrams/deciliter  hydrALAZINE 25milliGRAM(s) Oral every 6 hours PRN Systolic blood pressure >160  ALBUTerol    0.083% 2.5milliGRAM(s) Nebulizer every 6 hours PRN wheeze      Allergies    No Known Allergies    Intolerances          LABS:                          11.3   9.11  )-----------( 546      ( 09 Feb 2017 06:01 )             33.2     09 Feb 2017 06:01    128    |  91     |  27.0   ----------------------------<  372    4.3     |  16.0   |  1.33     Ca    8.5        09 Feb 2017 06:01  Mg     1.8       09 Feb 2017 06:01            RADIOLOGY & ADDITIONAL TESTS:

## 2017-02-09 NOTE — PROGRESS NOTE ADULT - ASSESSMENT
The patient is a 56 year old female with history of diabetes mellitus type 2 on insulin who presented to the ER from the office of her PMD. Seen and examined with a  at the bedside. According to the patient for the past 2 months she has had complaints of vomiting atleast 2-3 times a day associated with generalized abdominal pain. Her symptoms have progressively worsened and she has had associated weakness. She uses lantus at home for her diabetes and says she is compliant. She went to her PMD  with complaints of weakness and dark stool. Her hemoglobin was noted to be 5gm/dl therefore she was sent to the ER. In the ER hb is 6.7gm/dl with positive melena and guaic positive. Also noted to be hyperglycemic with hyponatremia given 2 liters of normal saline with insulin.     Evaluated by Gi, initially recommended EGD when hb/hct stable. However patient was noted to have hypoxia with increased o2 requirement. CT of the chest was done which showed bilateral interstitial infiltrates. Echocardiogram was normal. Lower extremity duplex negative. Discussed with Dr Velez, recommended iV steroids and antibiotic coverage with nebs. Worsening renal failure and hyponatremia. Suspicion for underlying vascluitis. Dr sarmiento, nephrology consulted. Vasculitis work up and renal ultrasound odered. Hb/hct was stable with no active bleeding started on heparin subcut for dvt prophylaxis.

## 2017-02-09 NOTE — CONSULT NOTE ADULT - ASSESSMENT
A 57 y/o female with PMhx of DM on insulin, HTN, presented with anemia, possible GI bleed, bilateral alveolar airspace opacities and MARSHALL.   Currently treated with antibiotics and steroid (for possible pneumonia vs AIP vs vasculitis)   She is clinically euvolemic and I highly doubt respiratory compromise is due to acute heart failure (no JVD, no S3, CXR and CT not consistent with pulmonary edema and IVC normal size and respiratory variation going against hypervolemia). BNP has low positive predictive value for heart failure especially with respiratory and renal disease.  no need for diuretics at this time  EF is mildly reduced to borderline normal , no need for any cardiac workup at this time given pulmonary , hematological and renal issues. Can eventually get ischemic w/u as an outpatient given multiple risk factors.

## 2017-02-09 NOTE — PROGRESS NOTE ADULT - PROBLEM SELECTOR PLAN 4
Bilateral pulmonary infiltrates with anemia and renal failure.   Rule out vasculitis  Renal and pulmonary on consult

## 2017-02-09 NOTE — CONSULT NOTE ADULT - SUBJECTIVE AND OBJECTIVE BOX
CARDIOLOGY CONSULTATION NOTE ([facility])      History obtained by: Patient and medical record     obtained: No    Chief complaint:    Patient is a 56y old  Female who presents with a chief complaint of dark stools (07 Feb 2017 11:01)      HPI:  The patient is a 56 year old female with PMHx of DM type 2 on insulin, initially presented with generalized abdominal pain, vomiting and dark stools (melena + guaiac) and weakness. She was initially found to be anemic (HB 6.7), received blood transfusion ,and was evaluated by GI for endoscopy. She however became hypoxemic and CXR showed bilateral ground glass opacities prompting a CT scan showing diffuse bilateral alveolar airspace opacities with differential including pneuomonia, AIP, alveolar hge. She also developed ARF and workup for vasculitis is ongoing. She had an echo showing mildly reduced LV systolic function with EF 45-50%. She has no history of CAD or MI. No prior history of heart failure. or prior cardiac workup. She is a poor historian but mentioned that she had no SOB on exertion 2 months ago, currently she is SOB at rest and requiring 50% ventimask to maintain saturation, no orthopnea or PND. Of note her CXR showed bilateral infiltrates on admission likely prior to blood transfusion. She denies any chest discomfort on exertion or rest. No palpitations dizziness or syncope. No LE edema. She received IVF since admission. She was hyperglycemic and hyponatremic on admission as well.       REVIEW OF SYMPTOMS: Cardiovascular: as per HPI;  Respiratory:  as per HPI  Genitourinary:  No dysuria, no hematuria; Gastrointestinal:  as per HPI. Neurological: No headache, no dizziness, no slurred speech;  Psychiatric: No agitation, no anxiety.  Musculoskeletal: left shoulder pain, Skin: no rash, urinary : pink urine over the past few weeks, No joint pain, All other system are negative     ALLERGIES: Allergies    No Known Allergies        MEDICATIONS  (STANDING):  pantoprazole  Injectable 40milliGRAM(s) IV Push every 12 hours  insulin lispro (HumaLOG) corrective regimen sliding scale  SubCutaneous three times a day before meals  dextrose 5%. 1000milliLiter(s) IV Continuous <Continuous>  dextrose 50% Injectable 12.5Gram(s) IV Push once  dextrose 50% Injectable 25Gram(s) IV Push once  dextrose 50% Injectable 25Gram(s) IV Push once  azithromycin  IVPB  IV Intermittent   cefTRIAXone   IVPB  IV Intermittent   azithromycin  IVPB 500milliGRAM(s) IV Intermittent every 24 hours  cefTRIAXone   IVPB 1Gram(s) IV Intermittent every 24 hours  methylPREDNISolone sodium succinate Injectable 60milliGRAM(s) IV Push every 6 hours  heparin  Injectable 5000Unit(s) SubCutaneous every 12 hours  insulin glargine Injectable (LANTUS) 10Unit(s) SubCutaneous at bedtime  insulin lispro Injectable (HumaLOG) 5Unit(s) SubCutaneous three times a day before meals    MEDICATIONS  (PRN):  ondansetron Injectable 4milliGRAM(s) IV Push every 6 hours PRN Nausea  dextrose Gel 1Dose(s) Oral once PRN Blood Glucose LESS THAN 70 milliGRAM(s)/deciliter  glucagon  Injectable 1milliGRAM(s) IntraMuscular once PRN Glucose LESS THAN 70 milligrams/deciliter  hydrALAZINE 25milliGRAM(s) Oral every 6 hours PRN Systolic blood pressure >160  ALBUTerol    0.083% 2.5milliGRAM(s) Nebulizer every 6 hours PRN wheeze      PAST MEDICAL HISTORY  as above      FAMILY HISTORY:  No pertinent family history in first degree relatives      SOCIAL HISTORY:  Denies smoking/alcohol/drugs        Vital Signs Last 24 Hrs  T(C): 36.9, Max: 37.4 (02-08 @ 20:34)  T(F): 98.4, Max: 99.4 (02-08 @ 20:34)  HR: 96 (84 - 96)  BP: 130/80 (130/80 - 174/98)  BP(mean): --  RR: 17 (16 - 18)  SpO2: 94% (90% - 95%)      PHYSICAL EXAM:  Constitutional: mild respiratory distress on ventimask  HEENT: Atraumatic and normcephalic , neck is supple . no JVD. No carotid bruit. PEERL   CNS: A&Ox3. No focal deficits. EOMI.   Lymph Nodes: Cervical : Not palpable.  Respiratory: CTAB, no rales, rhonchi or wheezes  Cardiovascular: S1S2 RRR. No murmur/rubs or gallop.  Gastrointestinal: Soft non-tender and non distended . +Bowel sounds. negative Engel's sign.  Extremities: No edema, pulses +2 b/l   Psychiatric: Calm . no agitation.  Skin: No skin lesions    Intake and output: I & Os for 24h ending 02-09 @ 07:00  =============================================  IN: 375 ml / OUT: 0 ml / NET: 375 ml    I & Os for current day (as of 02-09 @ 17:19)  =============================================  IN: 690 ml / OUT: 0 ml / NET: 690 ml      LABS:                        11.3   9.11  )-----------( 546      ( 09 Feb 2017 06:01 )             33.2     09 Feb 2017 06:01    128    |  91     |  27.0   ----------------------------<  372    4.3     |  16.0   |  1.33     Ca    8.5        09 Feb 2017 06:01  Mg     1.8       09 Feb 2017 06:01        ;p-BNP=Serum Pro-Brain Natriuretic Peptide: 5278 pg/mL (02-09 @ 14:37)        ECG: sinus tachycardia     RADIOLOGY & ADDITIONAL STUDIES:    X-ray:  Extensive bilateral diffuse airspace opacities with   central predominance, no cardiomegaly     CT scan: Diffuse bilateral alveolar airspace opacities and small pleural effusion,   nonspecific. The differential includes ARDS, pneumonia including   atypical, if immunocompromise PCP pneumonia, pulmonary hemorrhage and   other inflammatory pneumonitis.    ECHO FINDINGS:    1. Mildly decreased global left ventricular systolic function. Visually   estimated LVEF = 45-50%. RV systolic function is normal.   2. Mild diastolic dysfunction.   3. No significant valvular abnormalities.   4. Small pericardial effusion without tamponade physiology.   5. Bilateral pleural effusions.

## 2017-02-09 NOTE — PROGRESS NOTE ADULT - SUBJECTIVE AND OBJECTIVE BOX
PULMONARY PROGRESS NOTE      FRANNIE MORALESEARNEST-[id number]    Patient is a 56y old  Female who presents with a chief complaint of dark stools (07 Feb 2017 11:01)      INTERVAL HPI/OVERNIGHT EVENTS:  sitting up in chair, eating and drinking  denies any chest pain or sob  some cough, no hemoptysis  on 50% mask  MEDICATIONS  (STANDING):  pantoprazole  Injectable 40milliGRAM(s) IV Push every 12 hours  insulin lispro (HumaLOG) corrective regimen sliding scale  SubCutaneous three times a day before meals  dextrose 5%. 1000milliLiter(s) IV Continuous <Continuous>  dextrose 50% Injectable 12.5Gram(s) IV Push once  dextrose 50% Injectable 25Gram(s) IV Push once  dextrose 50% Injectable 25Gram(s) IV Push once  azithromycin  IVPB  IV Intermittent   cefTRIAXone   IVPB  IV Intermittent   azithromycin  IVPB 500milliGRAM(s) IV Intermittent every 24 hours  cefTRIAXone   IVPB 1Gram(s) IV Intermittent every 24 hours  methylPREDNISolone sodium succinate Injectable 60milliGRAM(s) IV Push every 6 hours  heparin  Injectable 5000Unit(s) SubCutaneous every 12 hours  insulin glargine Injectable (LANTUS) 10Unit(s) SubCutaneous at bedtime  insulin lispro Injectable (HumaLOG) 5Unit(s) SubCutaneous three times a day before meals      MEDICATIONS  (PRN):  ondansetron Injectable 4milliGRAM(s) IV Push every 6 hours PRN Nausea  dextrose Gel 1Dose(s) Oral once PRN Blood Glucose LESS THAN 70 milliGRAM(s)/deciliter  glucagon  Injectable 1milliGRAM(s) IntraMuscular once PRN Glucose LESS THAN 70 milligrams/deciliter  hydrALAZINE 25milliGRAM(s) Oral every 6 hours PRN Systolic blood pressure >160  ALBUTerol    0.083% 2.5milliGRAM(s) Nebulizer every 6 hours PRN wheeze      Allergies    No Known Allergies    Intolerances        PAST MEDICAL & SURGICAL HISTORY:  DM (diabetes mellitus)  No significant past surgical history      SOCIAL HISTORY  Smoking History:       REVIEW OF SYSTEMS:    CONSTITUTIONAL:  No distress    HEENT:  Eyes:  No diplopia or blurred vision. ENT:  No earache, sore throat or runny nose.    CARDIOVASCULAR:  No pressure, squeezing, tightness, heaviness or aching about the chest; no palpitations.    RESPIRATORY:  see hpi    GASTROINTESTINAL:  No nausea, vomiting or diarrhea.    GENITOURINARY:  No dysuria, frequency or urgency.    NEUROLOGIC:  No paresthesias, fasciculations, seizures or weakness.    PSYCHIATRIC:  No disorder of thought or mood.    Vital Signs Last 24 Hrs  T(C): 36.9, Max: 37.4 (02-08 @ 20:34)  T(F): 98.4, Max: 99.4 (02-08 @ 20:34)  HR: 96 (84 - 96)  BP: 130/80 (130/80 - 174/98)  BP(mean): --  RR: 17 (16 - 18)  SpO2: 94% (90% - 95%)    PHYSICAL EXAMINATION:    GENERAL: The patient is awake and alert in no apparent distress.     HEENT: Head is normocephalic and atraumatic. Extraocular muscles are intact. Mucous membranes are moist.    NECK: Supple.    LUNGS: moderate air entry bilat  without wheezing, rales or rhonchi; respirations unlabored    HEART: Regular rate and rhythm without murmur.    ABDOMEN: Soft, nontender, and nondistended.      EXTREMITIES: Without any cyanosis, clubbing, rash, lesions or edema.    NEUROLOGIC: Grossly intact.    LABS:                        11.3   9.11  )-----------( 546      ( 09 Feb 2017 06:01 )             33.2     09 Feb 2017 06:01    128    |  91     |  27.0   ----------------------------<  372    4.3     |  16.0   |  1.33     Ca    8.5        09 Feb 2017 06:01  Mg     1.8       09 Feb 2017 06:01                      Procalcitonin, Serum: <0.23 ng/mL (02-08 @ 19:52)      MICROBIOLOGY:    RADIOLOGY & ADDITIONAL STUDIES:   1. Mildly decreased global left ventricular systolic function. Visually   estimated LVEF = 45-50%. RV systolic function is normal.   2. Mild diastolic dysfunction.   3. No significant valvular abnormalities.   4. Small pericardial effusion without tamponade physiology.   5. Bilateral pleural effusions.   6. ** No prior echocardiograms available

## 2017-02-09 NOTE — PROGRESS NOTE ADULT - SUBJECTIVE AND OBJECTIVE BOX
Patient seen and examined.  Anemia better since transfused 3 units of PRBC's  Hgb stable at 11.3.  Denies abd pain.  Had normal brown BM yesterday.  No diarrhea.  On antibiotics for pneumonia via pulmonary.  Neb treatments in place. No GI sx.      PAST MEDICAL & SURGICAL HISTORY:  DM (diabetes mellitus)  No significant past surgical history      ROS:  No Heartburn, regurgitation, dysphagia, odynophagia.  No dyspepsia  No abdominal pain.    No Nausea, vomiting.  No Bleeding.  No hematemesis.   No diarrhea.    No hematochesia.  No weight loss, anorexia.  No edema.      MEDICATIONS  (STANDING):  pantoprazole  Injectable 40milliGRAM(s) IV Push every 12 hours  insulin lispro (HumaLOG) corrective regimen sliding scale  SubCutaneous three times a day before meals  dextrose 5%. 1000milliLiter(s) IV Continuous <Continuous>  dextrose 50% Injectable 12.5Gram(s) IV Push once  dextrose 50% Injectable 25Gram(s) IV Push once  dextrose 50% Injectable 25Gram(s) IV Push once  azithromycin  IVPB  IV Intermittent   cefTRIAXone   IVPB  IV Intermittent   azithromycin  IVPB 500milliGRAM(s) IV Intermittent every 24 hours  cefTRIAXone   IVPB 1Gram(s) IV Intermittent every 24 hours  methylPREDNISolone sodium succinate Injectable 60milliGRAM(s) IV Push every 6 hours  heparin  Injectable 5000Unit(s) SubCutaneous every 12 hours  insulin glargine Injectable (LANTUS) 10Unit(s) SubCutaneous at bedtime  insulin lispro Injectable (HumaLOG) 5Unit(s) SubCutaneous three times a day before meals    MEDICATIONS  (PRN):  ondansetron Injectable 4milliGRAM(s) IV Push every 6 hours PRN Nausea  dextrose Gel 1Dose(s) Oral once PRN Blood Glucose LESS THAN 70 milliGRAM(s)/deciliter  glucagon  Injectable 1milliGRAM(s) IntraMuscular once PRN Glucose LESS THAN 70 milligrams/deciliter  hydrALAZINE 25milliGRAM(s) Oral every 6 hours PRN Systolic blood pressure >160  ALBUTerol    0.083% 2.5milliGRAM(s) Nebulizer every 6 hours PRN wheeze      Allergies    No Known Allergies    Intolerances        Vital Signs Last 24 Hrs  T(C): 36.9, Max: 37.4 (02-08 @ 20:34)  T(F): 98.4, Max: 99.4 (02-08 @ 20:34)  HR: 96 (84 - 96)  BP: 130/80 (130/80 - 174/98)  BP(mean): --  RR: 17 (16 - 18)  SpO2: 94% (90% - 95%)    PHYSICAL EXAM:    GENERAL: NAD, well-groomed, well-developed  HEAD:  Atraumatic, Normocephalic  EYES: EOMI, PERRLA, conjunctiva and sclera clear  ENMT: No tonsillar erythema, exudates, or enlargement; Moist mucous membranes, Good dentition, No lesions  NECK: Supple, No JVD, Normal thyroid  CHEST/LUNG: Clear to percussion bilaterally; No rales, rhonchi, wheezing, or rubs  HEART: Regular rate and rhythm; No murmurs, rubs, or gallops  ABDOMEN: Soft, Nontender, Nondistended; Bowel sounds present  EXTREMITIES:  2+ Peripheral Pulses, No clubbing, cyanosis, or edema  LYMPH: No lymphadenopathy noted  SKIN: No rashes or lesions      LABS:                        11.3   9.11  )-----------( 546      ( 09 Feb 2017 06:01 )             33.2     09 Feb 2017 06:01    128    |  91     |  27.0   ----------------------------<  372    4.3     |  16.0   |  1.33     Ca    8.5        09 Feb 2017 06:01  Mg     1.8       09 Feb 2017 06:01               RADIOLOGY & ADDITIONAL STUDIES:

## 2017-02-09 NOTE — PROGRESS NOTE ADULT - PROBLEM SELECTOR PLAN 1
Alleges that prior colonoscopy 1 yr ago was neg.  Defer on EGD as infiltrates and pulmonary disease are significant.  Same PPI.

## 2017-02-09 NOTE — PROGRESS NOTE ADULT - PROBLEM SELECTOR PLAN 2
Lantus 5 units Qhs  Add pre meal humalog sliding scale  Uncontrolled DM Increase lantus to 10 units at QHS  Add pre meal humalog 5 units TID   Uncontrolled DM

## 2017-02-10 DIAGNOSIS — D72.829 ELEVATED WHITE BLOOD CELL COUNT, UNSPECIFIED: ICD-10-CM

## 2017-02-10 DIAGNOSIS — Z29.9 ENCOUNTER FOR PROPHYLACTIC MEASURES, UNSPECIFIED: ICD-10-CM

## 2017-02-10 DIAGNOSIS — R09.02 HYPOXEMIA: ICD-10-CM

## 2017-02-10 DIAGNOSIS — R11.14 BILIOUS VOMITING: ICD-10-CM

## 2017-02-10 DIAGNOSIS — J90 PLEURAL EFFUSION, NOT ELSEWHERE CLASSIFIED: ICD-10-CM

## 2017-02-10 DIAGNOSIS — D72.823 LEUKEMOID REACTION: ICD-10-CM

## 2017-02-10 DIAGNOSIS — R06.02 SHORTNESS OF BREATH: ICD-10-CM

## 2017-02-10 DIAGNOSIS — R91.8 OTHER NONSPECIFIC ABNORMAL FINDING OF LUNG FIELD: ICD-10-CM

## 2017-02-10 LAB
ALBUMIN SERPL ELPH-MCNC: 2.3 G/DL — LOW (ref 3.3–5.2)
ALP SERPL-CCNC: 90 U/L — SIGNIFICANT CHANGE UP (ref 40–120)
ALT FLD-CCNC: 9 U/L — SIGNIFICANT CHANGE UP
ANA PAT FLD IF-IMP: ABNORMAL
ANA TITR SER: ABNORMAL
ANION GAP SERPL CALC-SCNC: 16 MMOL/L — SIGNIFICANT CHANGE UP (ref 5–17)
AST SERPL-CCNC: 13 U/L — SIGNIFICANT CHANGE UP
BILIRUB SERPL-MCNC: 0.7 MG/DL — SIGNIFICANT CHANGE UP (ref 0.4–2)
BUN SERPL-MCNC: 60 MG/DL — HIGH (ref 8–20)
CALCIUM SERPL-MCNC: 8.6 MG/DL — SIGNIFICANT CHANGE UP (ref 8.6–10.2)
CHLORIDE SERPL-SCNC: 88 MMOL/L — LOW (ref 98–107)
CK SERPL-CCNC: 65 U/L — SIGNIFICANT CHANGE UP (ref 25–170)
CO2 SERPL-SCNC: 17 MMOL/L — LOW (ref 22–29)
CREAT SERPL-MCNC: 2.06 MG/DL — HIGH (ref 0.5–1.3)
DRVVT SCREEN TO CONFIRM RATIO: SIGNIFICANT CHANGE UP
DSDNA AB SER-ACNC: <12 IU/ML — SIGNIFICANT CHANGE UP
FERRITIN SERPL-MCNC: 500 NG/ML — HIGH (ref 11–306)
GLUCOSE SERPL-MCNC: 473 MG/DL — HIGH (ref 70–115)
HCT VFR BLD CALC: 29.8 % — LOW (ref 37–47)
HGB BLD-MCNC: 10.5 G/DL — LOW (ref 12–16)
IRON SATN MFR SERPL: 19 % — SIGNIFICANT CHANGE UP (ref 14–50)
IRON SATN MFR SERPL: 39 UG/DL — SIGNIFICANT CHANGE UP (ref 37–145)
LA NT DPL PPP QL: 30.9 SEC — SIGNIFICANT CHANGE UP
MCHC RBC-ENTMCNC: 27.6 PG — SIGNIFICANT CHANGE UP (ref 27–31)
MCHC RBC-ENTMCNC: 35.2 G/DL — SIGNIFICANT CHANGE UP (ref 32–36)
MCV RBC AUTO: 78.4 FL — LOW (ref 81–99)
NORMALIZED SCT PPP-RTO: 0.61 RATIO — SIGNIFICANT CHANGE UP (ref 0–1.16)
NORMALIZED SCT PPP-RTO: SIGNIFICANT CHANGE UP
OSMOLALITY UR: 417 MOSM/KG — SIGNIFICANT CHANGE UP (ref 300–1000)
PLATELET # BLD AUTO: 745 K/UL — HIGH (ref 150–400)
POTASSIUM SERPL-MCNC: 4.3 MMOL/L — SIGNIFICANT CHANGE UP (ref 3.5–5.3)
POTASSIUM SERPL-SCNC: 4.3 MMOL/L — SIGNIFICANT CHANGE UP (ref 3.5–5.3)
PROT SERPL-MCNC: 6.8 G/DL — SIGNIFICANT CHANGE UP (ref 6.6–8.7)
RBC # BLD: 3.8 M/UL — LOW (ref 4.4–5.2)
RBC # FLD: 14.3 % — SIGNIFICANT CHANGE UP (ref 11–15.6)
SODIUM SERPL-SCNC: 121 MMOL/L — LOW (ref 135–145)
SODIUM UR-SCNC: 41 MMOL/L — SIGNIFICANT CHANGE UP
TIBC SERPL-MCNC: 206 UG/DL — LOW (ref 220–430)
TRANSFERRIN SERPL-MCNC: 144 MG/DL — LOW (ref 192–382)
WBC # BLD: 16.2 K/UL — HIGH (ref 4.8–10.8)
WBC # FLD AUTO: 16.2 K/UL — HIGH (ref 4.8–10.8)

## 2017-02-10 PROCEDURE — 99222 1ST HOSP IP/OBS MODERATE 55: CPT

## 2017-02-10 PROCEDURE — 99233 SBSQ HOSP IP/OBS HIGH 50: CPT

## 2017-02-10 PROCEDURE — 36000 PLACE NEEDLE IN VEIN: CPT

## 2017-02-10 RX ORDER — DEXTROSE 50 % IN WATER 50 %
12.5 SYRINGE (ML) INTRAVENOUS ONCE
Qty: 0 | Refills: 0 | Status: DISCONTINUED | OUTPATIENT
Start: 2017-02-10 | End: 2017-02-15

## 2017-02-10 RX ORDER — GLUCAGON INJECTION, SOLUTION 0.5 MG/.1ML
1 INJECTION, SOLUTION SUBCUTANEOUS ONCE
Qty: 0 | Refills: 0 | Status: DISCONTINUED | OUTPATIENT
Start: 2017-02-10 | End: 2017-02-15

## 2017-02-10 RX ORDER — INSULIN LISPRO 100/ML
15 VIAL (ML) SUBCUTANEOUS
Qty: 0 | Refills: 0 | Status: DISCONTINUED | OUTPATIENT
Start: 2017-02-10 | End: 2017-02-15

## 2017-02-10 RX ORDER — DEXTROSE 50 % IN WATER 50 %
12.5 SYRINGE (ML) INTRAVENOUS ONCE
Qty: 0 | Refills: 0 | Status: DISCONTINUED | OUTPATIENT
Start: 2017-02-10 | End: 2017-02-10

## 2017-02-10 RX ORDER — DEXTROSE 50 % IN WATER 50 %
25 SYRINGE (ML) INTRAVENOUS ONCE
Qty: 0 | Refills: 0 | Status: DISCONTINUED | OUTPATIENT
Start: 2017-02-10 | End: 2017-02-15

## 2017-02-10 RX ORDER — DEXTROSE 50 % IN WATER 50 %
25 SYRINGE (ML) INTRAVENOUS ONCE
Qty: 0 | Refills: 0 | Status: DISCONTINUED | OUTPATIENT
Start: 2017-02-10 | End: 2017-02-10

## 2017-02-10 RX ORDER — GLUCAGON INJECTION, SOLUTION 0.5 MG/.1ML
1 INJECTION, SOLUTION SUBCUTANEOUS ONCE
Qty: 0 | Refills: 0 | Status: DISCONTINUED | OUTPATIENT
Start: 2017-02-10 | End: 2017-02-10

## 2017-02-10 RX ORDER — INSULIN LISPRO 100/ML
6 VIAL (ML) SUBCUTANEOUS ONCE
Qty: 0 | Refills: 0 | Status: COMPLETED | OUTPATIENT
Start: 2017-02-10 | End: 2017-02-10

## 2017-02-10 RX ORDER — SODIUM CHLORIDE 9 MG/ML
1000 INJECTION INTRAMUSCULAR; INTRAVENOUS; SUBCUTANEOUS
Qty: 0 | Refills: 0 | Status: DISCONTINUED | OUTPATIENT
Start: 2017-02-10 | End: 2017-02-13

## 2017-02-10 RX ORDER — SODIUM CHLORIDE 9 MG/ML
1000 INJECTION, SOLUTION INTRAVENOUS
Qty: 0 | Refills: 0 | Status: DISCONTINUED | OUTPATIENT
Start: 2017-02-10 | End: 2017-02-10

## 2017-02-10 RX ORDER — INSULIN LISPRO 100/ML
VIAL (ML) SUBCUTANEOUS
Qty: 0 | Refills: 0 | Status: DISCONTINUED | OUTPATIENT
Start: 2017-02-10 | End: 2017-02-10

## 2017-02-10 RX ORDER — DEXTROSE 50 % IN WATER 50 %
1 SYRINGE (ML) INTRAVENOUS ONCE
Qty: 0 | Refills: 0 | Status: DISCONTINUED | OUTPATIENT
Start: 2017-02-10 | End: 2017-02-10

## 2017-02-10 RX ORDER — DEXTROSE 50 % IN WATER 50 %
1 SYRINGE (ML) INTRAVENOUS ONCE
Qty: 0 | Refills: 0 | Status: DISCONTINUED | OUTPATIENT
Start: 2017-02-10 | End: 2017-02-15

## 2017-02-10 RX ORDER — SODIUM CHLORIDE 9 MG/ML
1000 INJECTION, SOLUTION INTRAVENOUS
Qty: 0 | Refills: 0 | Status: DISCONTINUED | OUTPATIENT
Start: 2017-02-10 | End: 2017-02-15

## 2017-02-10 RX ORDER — PANTOPRAZOLE SODIUM 20 MG/1
40 TABLET, DELAYED RELEASE ORAL DAILY
Qty: 0 | Refills: 0 | Status: DISCONTINUED | OUTPATIENT
Start: 2017-02-10 | End: 2017-02-15

## 2017-02-10 RX ORDER — INSULIN LISPRO 100/ML
VIAL (ML) SUBCUTANEOUS AT BEDTIME
Qty: 0 | Refills: 0 | Status: DISCONTINUED | OUTPATIENT
Start: 2017-02-10 | End: 2017-02-10

## 2017-02-10 RX ORDER — SODIUM CHLORIDE 9 MG/ML
1 INJECTION INTRAMUSCULAR; INTRAVENOUS; SUBCUTANEOUS THREE TIMES A DAY
Qty: 0 | Refills: 0 | Status: COMPLETED | OUTPATIENT
Start: 2017-02-10 | End: 2017-02-13

## 2017-02-10 RX ORDER — INSULIN GLARGINE 100 [IU]/ML
20 INJECTION, SOLUTION SUBCUTANEOUS AT BEDTIME
Qty: 0 | Refills: 0 | Status: DISCONTINUED | OUTPATIENT
Start: 2017-02-10 | End: 2017-02-15

## 2017-02-10 RX ADMIN — Medication 60 MILLIGRAM(S): at 12:41

## 2017-02-10 RX ADMIN — PANTOPRAZOLE SODIUM 40 MILLIGRAM(S): 20 TABLET, DELAYED RELEASE ORAL at 05:43

## 2017-02-10 RX ADMIN — HEPARIN SODIUM 5000 UNIT(S): 5000 INJECTION INTRAVENOUS; SUBCUTANEOUS at 17:55

## 2017-02-10 RX ADMIN — Medication 60 MILLIGRAM(S): at 05:43

## 2017-02-10 RX ADMIN — Medication 6: at 09:00

## 2017-02-10 RX ADMIN — INSULIN GLARGINE 20 UNIT(S): 100 INJECTION, SOLUTION SUBCUTANEOUS at 21:45

## 2017-02-10 RX ADMIN — PANTOPRAZOLE SODIUM 40 MILLIGRAM(S): 20 TABLET, DELAYED RELEASE ORAL at 17:55

## 2017-02-10 RX ADMIN — CEFTRIAXONE 100 GRAM(S): 500 INJECTION, POWDER, FOR SOLUTION INTRAMUSCULAR; INTRAVENOUS at 14:27

## 2017-02-10 RX ADMIN — AZITHROMYCIN 255 MILLIGRAM(S): 500 TABLET, FILM COATED ORAL at 17:52

## 2017-02-10 RX ADMIN — SODIUM CHLORIDE 80 MILLILITER(S): 9 INJECTION INTRAMUSCULAR; INTRAVENOUS; SUBCUTANEOUS at 21:46

## 2017-02-10 RX ADMIN — Medication 6 UNIT(S): at 03:00

## 2017-02-10 RX ADMIN — Medication 60 MILLIGRAM(S): at 17:57

## 2017-02-10 RX ADMIN — Medication: at 00:15

## 2017-02-10 RX ADMIN — SODIUM CHLORIDE 1 GRAM(S): 9 INJECTION INTRAMUSCULAR; INTRAVENOUS; SUBCUTANEOUS at 23:47

## 2017-02-10 RX ADMIN — Medication 15 UNIT(S): at 17:54

## 2017-02-10 RX ADMIN — Medication 60 MILLIGRAM(S): at 23:47

## 2017-02-10 RX ADMIN — Medication 15 UNIT(S): at 12:44

## 2017-02-10 RX ADMIN — Medication 5 UNIT(S): at 09:02

## 2017-02-10 RX ADMIN — HEPARIN SODIUM 5000 UNIT(S): 5000 INJECTION INTRAVENOUS; SUBCUTANEOUS at 05:43

## 2017-02-10 NOTE — CONSULT NOTE ADULT - ASSESSMENT
This 56 y.o F with DM2  on insulin, who presented here fro 2 months of  weakness, dark stools, found with SYMPTOMATIC anemia , with GI blood loss, workup in process.  concurrent Hyponatremia and hyperglycemia.  CT chest 2/8/17 showed  bilateral interstitial infiltrates, and primary team initiated vasculitis workup, started empirically on antibiotics (Ceftriaxone and azithromycin) after the findings on CT scan on 2/8/17

## 2017-02-10 NOTE — PROGRESS NOTE ADULT - SUBJECTIVE AND OBJECTIVE BOX
PULMONARY PROGRESS NOTE      JAROCHO MORALES  [id number]    Patient is a 56y old  Female who presents with a chief complaint of dark stools (2017 11:01)      INTERVAL HPI/OVERNIGHT EVENTS:    Patient awake and alert  Feels better with resolution of hemoptysis    MEDICATIONS  (STANDING):  pantoprazole  Injectable 40milliGRAM(s) IV Push every 12 hours  azithromycin  IVPB  IV Intermittent   cefTRIAXone   IVPB  IV Intermittent   azithromycin  IVPB 500milliGRAM(s) IV Intermittent every 24 hours  cefTRIAXone   IVPB 1Gram(s) IV Intermittent every 24 hours  methylPREDNISolone sodium succinate Injectable 60milliGRAM(s) IV Push every 6 hours  heparin  Injectable 5000Unit(s) SubCutaneous every 12 hours  insulin glargine Injectable (LANTUS) 20Unit(s) SubCutaneous at bedtime  insulin lispro Injectable (HumaLOG) 15Unit(s) SubCutaneous three times a day before meals  dextrose 5%. 1000milliLiter(s) IV Continuous <Continuous>  dextrose 50% Injectable 12.5Gram(s) IV Push once  dextrose 50% Injectable 25Gram(s) IV Push once  dextrose 50% Injectable 25Gram(s) IV Push once      MEDICATIONS  (PRN):  ondansetron Injectable 4milliGRAM(s) IV Push every 6 hours PRN Nausea  hydrALAZINE 25milliGRAM(s) Oral every 6 hours PRN Systolic blood pressure >160  ALBUTerol    0.083% 2.5milliGRAM(s) Nebulizer every 6 hours PRN wheeze  dextrose Gel 1Dose(s) Oral once PRN Blood Glucose LESS THAN 70 milliGRAM(s)/deciliter  glucagon  Injectable 1milliGRAM(s) IntraMuscular once PRN Glucose LESS THAN 70 milligrams/deciliter      Allergies    No Known Allergies    Intolerances        PAST MEDICAL & SURGICAL HISTORY:  DM (diabetes mellitus)  No significant past surgical history        REVIEW OF SYSTEMS:    CONSTITUTIONAL:  No distress    HEENT:  Eyes:  No diplopia or blurred vision. ENT:  No earache, sore throat or runny nose.    CARDIOVASCULAR:  No pressure, squeezing, tightness, heaviness or aching about the chest; no palpitations.    RESPIRATORY:  Decrased cough, shortness of breath, no PND or orthopnea. Mild SOBOE    GASTROINTESTINAL:  No nausea, vomiting or diarrhea.    GENITOURINARY:  No dysuria, frequency or urgency.    NEUROLOGIC:  No paresthesias, fasciculations, seizures or weakness.    PSYCHIATRIC:  No disorder of thought or mood.    Vital Signs Last 24 Hrs  T(C): 36.7, Max: 36.9 ( @ 11:09)  T(F): 98, Max: 98.4 ( @ 11:09)  HR: 92 (90 - 96)  BP: 150/84 (130/80 - 150/84)  BP(mean): --  RR: 16 (16 - 18)  SpO2: 98% (94% - 98%)    PHYSICAL EXAMINATION:    GENERAL: The patient is awake and alert in no apparent distress.     HEENT: Head is normocephalic and atraumatic. Extraocular muscles are intact. Mucous membranes are moist.    NECK: Supple.    LUNGS: Clear to auscultation without wheezing, rales or rhonchi; respirations unlabored    HEART: Regular rate and rhythm without murmur.    ABDOMEN: Soft, nontender, and nondistended.      EXTREMITIES: Without any cyanosis, clubbing, rash, lesions or edema.    NEUROLOGIC: Grossly intact.    LABS:                        10.5   16.20 )-----------( 745      ( 10 Feb 2017 07:38 )             29.8     10 Feb 2017 07:38    121    |  88     |  60.0   ----------------------------<  473    4.3     |  17.0   |  2.06     Ca    8.6        10 Feb 2017 07:38  Mg     1.8       2017 06:01    TPro  6.8    /  Alb  2.3    /  TBili  0.7    /  DBili  x      /  AST  13     /  ALT  9      /  AlkPhos  90     10 Feb 2017 07:38      Urinalysis Basic - ( 2017 18:19 )    Color: Yellow / Appearance: Clear / S.020 / pH: x  Gluc: x / Ketone: Trace  / Bili: Negative / Urobili: Negative mg/dL   Blood: x / Protein: 500 mg/dL / Nitrite: Negative   Leuk Esterase: Small / RBC: 11-25 /HPF / WBC 6-10   Sq Epi: x / Non Sq Epi: Occasional / Bacteria: Few        CARDIAC MARKERS ( 10 Feb 2017 07:38 )  x     / x     / 65 U/L / x     / x            Serum Pro-Brain Natriuretic Peptide: 5278 pg/mL ( @ 14:37)      Procalcitonin, Serum: <0.23 ng/mL ( @ 19:52)      RADIOLOGY & ADDITIONAL STUDIES:    EXAM:  US KIDNEY(S)                          PROCEDURE DATE:  2017        INTERPRETATION:  CLINICAL HISTORY: Renal insufficiency.    TECHNIQUE: Renal Sonography.     COMPARISON: None    FINDINGS: The right kidney displays increased cortical echogenicity   consistent with chronic medical renal disease. The right kidney measures   9.4 cm in longitudinal dimension. No right hydronephrosis, mass or   calculi is visualized. Upper pole cyst measures 1.5 cm.    The left kidney displays increased cortical echogenicity consistent with   chronic medical renal disease. The left kidney measures 10.9 cm in   longitudinal dimension. No right hydronephrosis, mass or calculi is   visualized. Midpole cyst measures 1.5 cm.    The bladder is measures 8.8 x 7 x 7 x 4.2 cm. Left ureteral jet   visualized. Right ureteral jet not seen. No gross intravesical mass.      IMPRESSION:  Bilateral increased cortical echogenicity consistent with chronic medical   renal disease.  No hydronephrosis, mass or calculus.      NISHA ZAVALA M.D., ATTENDING RADIOLOGIST  This document has been electronically signed. 2017  4:09PM       EXAM:  US DPLX LWR EXT VEINS COMPL BI                          PROCEDURE DATE:  2017        INTERPRETATION:  2017 9:50 PM    CLINICAL INFORMATION: Leg pain    COMPARISON: None available.    TECHNIQUE: Duplex sonography of the bilateral lower extremities with   color and spectral Doppler, with and without compression.      FINDINGS:    There is normal compressibility of the bilateral common femoral, femoral   and popliteal veins. No calf vein thrombosis is detected.    Right Barroso's cyst measures 4.9 x 1.7 x 3.7 cm.    Left Baker's cyst measures 4.1 x 1.0 x 2.3 cm.    IMPRESSION:     No evidence of bilateral lower extremity deep venous thrombosis.    Bilateral Baker's cysts    ELIANE JOSHI M.D., ATTENDING RADIOLOGIST  This document has been electronically signed. 2017  9:58PM    EXAM:  CT CHEST                          PROCEDURE DATE:  2017        INTERPRETATION:  Clinical information: Dyspnea    Comparison: Multiple radiographs the last on     PROCEDURE:   CT of the Chest was performed without intravenous contrast.    FINDINGS:    CHEST:    CHEST WALL AND LOWER NECK: Within normal limits  MEDIASTINUM AND YOLY: Mildly enlarged mediastinal lymph nodes up to 1.4   cm, nonspecific.  HEART: Within normal limits  VESSELS: Unopacified great vessels are unremarkable.  LUNG AND LARGE AIRWAYS: Diffuse bilateral airspace opacities. No   endobronchial mass. Small bilateral pleural effusions. No    UPPER ABDOMEN: Left renal cyst, subcutaneous soft tissue edema.    MUSCULOSKELETAL: Within normal limits    IMPRESSION:     Diffuse bilateral alveolar airspace opacities and small pleural effusion,   nonspecific. The differential includes ARDS, pneumonia including   atypical, if immunocompromise PCP pneumonia, pulmonary hemorrhage and   other inflammatory pneumonitis.    PAOLO PRICE M.D., ATTENDING RADIOLOGIST  This document has been electronically signed. 2017  2:19PM          EXAM:  ECHO TRANSTHORACIC COMP W DOPP      PROCEDURE DATE:  2017   .      INTERPRETATION:  REPORT:    TRANSTHORACIC ECHOCARDIOGRAM REPORT           Patient Name:   JAROCHO MORALES Patient Location: [facility] Rec #:  [id number]     Accession #:      [id number]  Account #:                   Height:           65.0 in 165.1 cm  YOB: 1960     Weight:           145.0 lb 65.77 kg  Patient Age:    56 years     BSA:              1.73 m²  Patient Gender: F            BP:    152/70 mmHg        Date of Exam:        2017 6:21:30 PM  Sonographer:         Alexandra Thompson  Referring Physician: Christine Barnes MD     Procedure:   2D Echo/Doppler/Color Doppler Complete.  Indications: Shortness of breath - R06.02  Diagnosis:   Shortness of breath - R06.02           2D AND M-MODE MEASUREMENTS (normal ranges within parentheses):  Left                Normal    Aorta/Left           Normal  Ventricle:                    Atrium:  IVSd (2D):    0.86  (0.7-1.1) Aortic Root  3.20 cm (2.4-3.7)                cm              (2D):  LVPWd (2D):   0.81  (0.7-1.1) Left Atrium  3.90 cm (1.9-4.0)                cm              (2D):  LVIDd (2D):   4.40  (3.4-5.7) LA Volume    38.5                cm              Index        ml/m²  LVIDs (2D):   3.25                cm  LV FS (2D):   26.1  (>25%)                %  LV EF (2D):   51 %  (>55%)  Relative Wall 0.37  (<0.42)  Thickness     LV SYSTOLIC FUNCTION BY 2D PLANIMETRY (MOD):  EF-A4C View: 48.8 % EF-A2C View: 57.8 %     LV DIASTOLIC FUNCTION:  MV Peak E: 0.76 m/s  MV Peak A: 1.19 m/s  E/A Ratio: 0.64     SPECTRAL DOPPLER ANALYSIS (where applicable):  Aortic Valve: AoV Max Darius: 1.57 m/s AoV Peak P.8 mmHg AoV Mean P.0 mmHg     LVOT Vmax: 0.99 m/s LVOT VTI: 0.199 m LVOT Diameter: 2.00 cm     AoV Area, Vmax: 1.98 cm² AoV Area, VTI: 2.05 cm² AoV Area, Vmn: 1.97 cm²  Ao VTI: 0.305     PHYSICIAN INTERPRETATION:  Left Ventricle: The left ventricular internal cavity size is normal. Left   ventricular wall thickness isnormal.  Global LV systolic function was mildly decreased. Left ventricular   ejection fraction, by visual estimation, is 45-50%. There is mild   impairment of LV relaxation (grade 1 diastolic dysfunction).  Right Ventricle: Normal right ventricular size and function.  Left Atrium: Mildly enlarged left atrium.  Right Atrium: The right atrium is normal in size.  Pericardium: A small pericardial effusion is present. The pericardial   effusion is globally located around the entire heart. There is no  evidence for chamber compression or significant variability in mitral   inflow. There is no evidence for cardiac tamponade. There is a small   pleural effusion in both left and right lateral regions.  Mitral Valve: Structurally normal mitral valve,with normal leaflet   excursion. Trace mitral valve regurgitation is seen.  Tricuspid Valve: Structurally normal tricuspid valve, with normal leaflet   excursion. Trivial tricuspid regurgitation is visualized. Adequate TR   velocity was not obtained to accurately assess RVSP.  Aortic Valve: The aortic valve is trileaflet. Leaflet thickness and   mobility are normal. No aortic stenosis. No evidence of aortic valve   regurgitation is seen.  Pulmonic Valve: Structurally normal pulmonic valve, with normal leaflet   excursion. Trace pulmonic valve regurgitation.  Aorta: The visualized portions of the aortic root are normal in size.  Pulmonary Artery: The main pulmonary artery is normal in size.  Venous: A normal flow pattern is recorded from the right upper pulmonary   vein. The proximal inferior vena cava was normal sized, with respiratory   size variation greater than 50%, consistent with normal central venous   pressure. Estimated RA pressure = 3 mmHg . The inferior vena cava and the   hepatic vein show a normal flow pattern.        Summary:   1. Mildly decreased global left ventricular systolic function. Visually   estimated LVEF = 45-50%. RV systolic function is normal.   2. Mild diastolic dysfunction.   3. No significant valvular abnormalities.   4. Small pericardial effusion without tamponade physiology.   5. Bilateral pleural effusions.   6. ** No prior echocardiograms available for comparison.     Gissel Jackson DO Electronically signed on 2017 at 6:57:32 PM        *** Final ***      GISSEL ORTIZ   This document has been electronically signed. 2017  6:21PM

## 2017-02-10 NOTE — PROGRESS NOTE ADULT - PROBLEM SELECTOR PLAN 1
Stable status post transfusion 2 units prbc  No evidence of acute bleeding. EGD held until stable. Stable status post transfusion 2 units prbc on 02/06/2017  No evidence of acute bleeding. EGD held until stable.

## 2017-02-10 NOTE — PROGRESS NOTE ADULT - SUBJECTIVE AND OBJECTIVE BOX
Chief Complaint:  IV access, nurse requests.       Assessment:  IV access obtained left lower forearm no bleeding, no infiltrate, no bruising, no pain, + flush.        Plan:  Monitor site for signs and symptoms of infection.

## 2017-02-10 NOTE — PROGRESS NOTE ADULT - PROBLEM SELECTOR PLAN 4
Bilateral pulmonary infiltrates with anemia and renal failure.   Rule out vasculitis  Renal and pulmonary on consult  ON IV steroids  IV rocephin and azithromycin day 3  ID on consult  Pan culture Bilateral pulmonary infiltrates with anemia and renal failure.   Rule out vasculitis vs TRALI  Renal and pulmonary on consult  ON IV steroids  IV rocephin and azithromycin day 3  ID on consult  Pan culture

## 2017-02-10 NOTE — PROGRESS NOTE ADULT - SUBJECTIVE AND OBJECTIVE BOX
Patient is a 56y old  Female who presents with a chief complaint of dark stools (07 Feb 2017 11:01)      HPI:  The patient is a 56 year old female with history of diabetes mellitus type 2 on insulin who presented to the ER from the office of her PMD.  Patient tolerating  diet,. No abdominal pain. No melena. hemoglobin normal     REVIEW OF SYSTEMS:  Constitutional: No fever, weight loss or fatigue  ENMT:  No difficulty hearing, tinnitus, vertigo; No sinus or throat pain  Respiratory: No cough, wheezing, chills or hemoptysis  Cardiovascular: No chest pain, palpitations, dizziness or leg swelling  Gastrointestinal: No abdominal or epigastric pain. No nausea, vomiting or hematemesis; No diarrhea or constipation. No melena or hematochezia.  Skin: No itching, burning, rashes or lesions   Musculoskeletal: No joint pain or swelling; No muscle, back or extremity pain    PAST MEDICAL & SURGICAL HISTORY:  DM (diabetes mellitus)  No significant past surgical history      FAMILY HISTORY:  No pertinent family history in first degree relatives      SOCIAL HISTORY:  Smoking Status: [ ] Current, [ ] Former, [ ] Never  Pack Years:  [  ] EtOH  [  ] IVDA    MEDICATIONS:  MEDICATIONS  (STANDING):  pantoprazole  Injectable 40milliGRAM(s) IV Push every 12 hours  dextrose 5%. 1000milliLiter(s) IV Continuous <Continuous>  dextrose 50% Injectable 12.5Gram(s) IV Push once  dextrose 50% Injectable 25Gram(s) IV Push once  dextrose 50% Injectable 25Gram(s) IV Push once  azithromycin  IVPB  IV Intermittent   cefTRIAXone   IVPB  IV Intermittent   azithromycin  IVPB 500milliGRAM(s) IV Intermittent every 24 hours  cefTRIAXone   IVPB 1Gram(s) IV Intermittent every 24 hours  methylPREDNISolone sodium succinate Injectable 60milliGRAM(s) IV Push every 6 hours  heparin  Injectable 5000Unit(s) SubCutaneous every 12 hours  insulin glargine Injectable (LANTUS) 10Unit(s) SubCutaneous at bedtime  insulin lispro Injectable (HumaLOG) 5Unit(s) SubCutaneous three times a day before meals  insulin lispro (HumaLOG) corrective regimen sliding scale  SubCutaneous three times a day before meals  insulin lispro (HumaLOG) corrective regimen sliding scale  SubCutaneous at bedtime  dextrose 5%. 1000milliLiter(s) IV Continuous <Continuous>  dextrose 50% Injectable 12.5Gram(s) IV Push once  dextrose 50% Injectable 25Gram(s) IV Push once  dextrose 50% Injectable 25Gram(s) IV Push once    MEDICATIONS  (PRN):  ondansetron Injectable 4milliGRAM(s) IV Push every 6 hours PRN Nausea  dextrose Gel 1Dose(s) Oral once PRN Blood Glucose LESS THAN 70 milliGRAM(s)/deciliter  glucagon  Injectable 1milliGRAM(s) IntraMuscular once PRN Glucose LESS THAN 70 milligrams/deciliter  hydrALAZINE 25milliGRAM(s) Oral every 6 hours PRN Systolic blood pressure >160  ALBUTerol    0.083% 2.5milliGRAM(s) Nebulizer every 6 hours PRN wheeze  dextrose Gel 1Dose(s) Oral once PRN Blood Glucose LESS THAN 70 milliGRAM(s)/deciliter  glucagon  Injectable 1milliGRAM(s) IntraMuscular once PRN Glucose LESS THAN 70 milligrams/deciliter      Allergies    No Known Allergies    Intolerances        Vital Signs Last 24 Hrs  T(C): 36.7, Max: 36.9 (02-09 @ 11:09)  T(F): 98, Max: 98.4 (02-09 @ 11:09)  HR: 92 (90 - 96)  BP: 150/84 (130/80 - 150/84)  BP(mean): --  RR: 16 (16 - 18)  SpO2: 98% (94% - 98%)    I & Os for current day (as of 02-10 @ 08:49)  =============================================  IN: 1050 ml / OUT: 350 ml / NET: 700 ml        PHYSICAL EXAM:    General: Well developed; well nourished; in no acute distress  HEENT: MMM, conjunctiva and sclera clear  Gastrointestinal: Soft, non-tender non-distended; Normal bowel sounds; No rebound or guarding  Extremities: Normal range of motion, No clubbing, cyanosis or edema  Neurological: Alert and oriented x3  Skin: Warm and dry. No obvious rash  rectal exam- no melena,       LABS:                        10.5   16.20 )-----------( 745      ( 10 Feb 2017 07:38 )             29.8     10 Feb 2017 07:38    121    |  88     |  60.0   ----------------------------<  473    4.3     |  17.0   |  2.06     Ca    8.6        10 Feb 2017 07:38  Mg     1.8       09 Feb 2017 06:01    TPro  6.8    /  Alb  2.3    /  TBili  0.7    /  DBili  x      /  AST  13     /  ALT  9      /  AlkPhos  90     / Amylase x      /Lipase x      10 Feb 2017 07:38        Iron Total, Serum: 39 ug/dL (02-10 @ 07:38)  Iron - Total Binding Capacity.: 206 ug/dL (02-10 @ 07:38)  Ferritin, Serum: 500.0 ng/mL (02-10 @ 07:38)        RADIOLOGY & ADDITIONAL STUDIES:

## 2017-02-10 NOTE — PROGRESS NOTE ADULT - ASSESSMENT
The patient is a 56 year old female with history of diabetes mellitus type 2 on insulin who presented to the ER from the office of her PMD. Seen and examined with a  at the bedside. According to the patient for the past 2 months she has had complaints of vomiting atleast 2-3 times a day associated with generalized abdominal pain. Her symptoms have progressively worsened and she has had associated weakness. She uses lantus at home for her diabetes and says she is compliant. She went to her PMD  with complaints of weakness and dark stool. Her hemoglobin was noted to be 5gm/dl therefore she was sent to the ER. In the ER hb is 6.7gm/dl with positive melena and guaic positive. Also noted to be hyperglycemic with hyponatremia given 2 liters of normal saline with insulin.     Evaluated by Gi, initially recommended EGD when hb/hct stable. However patient was noted to have hypoxia with increased o2 requirement. CT of the chest was done which showed bilateral interstitial infiltrates. Echocardiogram was normal. Lower extremity duplex negative. Discussed with Dr Velez, recommended iV steroids and antibiotic coverage with nebs. Worsening renal failure and hyponatremia. Suspicion for underlying vascluitis. Dr sarmiento, nephrology consulted. Vasculitis work up and renal ultrasound odered. Hb/hct was stable with no active bleeding started on heparin subcut for dvt prophylaxis.     Lnatus and humalog increased to hyperglycemia secondary to steroids. The patient is a 56 year old female with history of diabetes mellitus type 2 on insulin who presented to the ER from the office of her PMD. Seen and examined with a  at the bedside. According to the patient for the past 2 months she has had complaints of vomiting atleast 2-3 times a day associated with generalized abdominal pain. Her symptoms have progressively worsened and she has had associated weakness. She uses lantus at home for her diabetes and says she is compliant. She went to her PMD  with complaints of weakness and dark stool. Her hemoglobin was noted to be 5gm/dl therefore she was sent to the ER. In the ER hb is 6.7gm/dl with positive melena and guaic positive. Also noted to be hyperglycemic with hyponatremia given 2 liters of normal saline with insulin.     Evaluated by Gi, initially recommended EGD when hb/hct stable. However patient was noted to have hypoxia with increased o2 requirement. CT of the chest was done which showed bilateral interstitial infiltrates. Echocardiogram was showed decrased systolic function ef of 45%. Evlauated by Dr Meyers cardiology, recommends no diuresis at this time given renal insufficinecy and no cardiac work up reocmmended at this time.  Lower extremity duplex negative. Discussed with Dr Velez, recommended iV steroids and antibiotic coverage with nebs. Worsening renal failure and hyponatremia. Suspicion for underlying vascluitis. Dr sarmiento, nephrology consulted. Vasculitis work up and renal ultrasound odered. Hb/hct was stable with no active bleeding started on heparin subcut for dvt prophylaxis.     Lnatus and humalog increased to hyperglycemia secondary to steroids.

## 2017-02-10 NOTE — CONSULT NOTE ADULT - SUBJECTIVE AND OBJECTIVE BOX
[facility]PP INFECTIOUS DISEASES AND INTERNAL MEDICINE OF [place]  =======================================================  Jose Carlos Hoskins MD [facility]   Ming Nuñez MD  Diplomates American Board of Internal Medicine and Infectious Diseases  =======================================================    MRN-[id number]  JAROCHO MORALES is a 56y  Female   This 56 y.o F with DM2  on insulin, who presented here fro 2 months of abd pain, weakness, dark stools, found with anemia with HgB of 5grams/dL, with positive stool OB.  Patient was transfused with 3 units of PRBC during this admission.  She was found with concurrent Hyponatremia and hyperglycemia.     Gi evaluation and EGD pending due to SOB.  Normal echocardiogram, CT chest 17 showed  bilateral interstitial infiltrates, and primary team initiated vasculitis workup.    Patient had no fevers during the hospital course. She had been started empirically on antibiotics (Ceftriaxone and azithromycin) after the findings on CT scan on 17      =======================================================  Past Medical & Surgical Hx:  =====================  PAST MEDICAL & SURGICAL HISTORY:  DM (diabetes mellitus)  No significant past surgical history      Problem List:  ==========  HEALTH ISSUES - PROBLEM Dx:  Pleural effusion: Pleural effusion  Prophylactic measure: Prophylactic measure  Leukocytosis: Leukocytosis  Shortness of breath at rest: Shortness of breath at rest  Hypoxia: Hypoxia  Lung infiltrate on CT: Lung infiltrate on CT  Bilious vomiting with nausea: Bilious vomiting with nausea  DM (diabetes mellitus): DM (diabetes mellitus)  MARSHALL (acute kidney injury): MARSHALL (acute kidney injury)  Acute respiratory distress: Acute respiratory distress  Anemia: Anemia  Hyponatremia: Hyponatremia  Type 2 diabetes mellitus with hyperglycemia, without long-term current use of insulin: Type 2 diabetes mellitus with hyperglycemia, without long-term current use of insulin  Abdominal pain of unknown cause: Abdominal pain of unknown cause  Anemia, unspecified type: Anemia, unspecified type          Social Hx:  =======  no current toxic habits        FAMILY HISTORY:  No pertinent family history in first degree relatives      Allergies  No Known Allergies  Intolerances        MEDICATIONS  (STANDING):  pantoprazole  Injectable 40milliGRAM(s) IV Push every 12 hours  azithromycin  IVPB  IV Intermittent   cefTRIAXone   IVPB  IV Intermittent   azithromycin  IVPB 500milliGRAM(s) IV Intermittent every 24 hours  cefTRIAXone   IVPB 1Gram(s) IV Intermittent every 24 hours  methylPREDNISolone sodium succinate Injectable 60milliGRAM(s) IV Push every 6 hours  heparin  Injectable 5000Unit(s) SubCutaneous every 12 hours  insulin glargine Injectable (LANTUS) 20Unit(s) SubCutaneous at bedtime  insulin lispro Injectable (HumaLOG) 15Unit(s) SubCutaneous three times a day before meals  dextrose 5%. 1000milliLiter(s) IV Continuous <Continuous>  dextrose 50% Injectable 12.5Gram(s) IV Push once  dextrose 50% Injectable 25Gram(s) IV Push once  dextrose 50% Injectable 25Gram(s) IV Push once    MEDICATIONS  (PRN):  ondansetron Injectable 4milliGRAM(s) IV Push every 6 hours PRN Nausea  hydrALAZINE 25milliGRAM(s) Oral every 6 hours PRN Systolic blood pressure >160  ALBUTerol    0.083% 2.5milliGRAM(s) Nebulizer every 6 hours PRN wheeze  dextrose Gel 1Dose(s) Oral once PRN Blood Glucose LESS THAN 70 milliGRAM(s)/deciliter  glucagon  Injectable 1milliGRAM(s) IntraMuscular once PRN Glucose LESS THAN 70 milligrams/deciliter        ANTIBIOTICS:      =======================================================  REVIEW OF SYSTEMS:  CONSTITUTIONAL:  as per HPI  HEENT:  Eyes:  No diplopia or blurred vision. ENT:  No earache, sore throat or runny nose.  CARDIOVASCULAR:  No pressure, squeezing, strangling, tightness, heaviness or aching about the chest, neck, axilla or epigastrium.  RESPIRATORY:  No cough, shortness of breath, PND or orthopnea.  GASTROINTESTINAL:  No nausea, vomiting or diarrhea.  GENITOURINARY:  No dysuria, frequency or urgency. No Blood in urine  MUSCULOSKELETAL:  no joint aches, no muscle pain  SKIN:  No change in skin, hair or nails.  NEUROLOGIC:  No paresthesias, fasciculations, seizures or weakness.  PSYCHIATRIC:  No disorder of thought or mood.  ENDOCRINE:  No heat or cold intolerance, polyuria or polydipsia.  HEMATOLOGICAL:  No easy bruising or bleeding.     =======================================================  I&O's Detail    I & Os for current day (as of 10 Feb 2017 10:23)  =============================================  IN:    Oral Fluid: 600 ml    sodium chloride 0.9%: 450 ml    Total IN: 1050 ml  ---------------------------------------------  OUT:    Voided: 350 ml    Total OUT: 350 ml  ---------------------------------------------  Total NET: 700 ml      Physical Exam:  ============  Vital Signs Last 24 Hrs  T(C): 36.5, Max: 36.9 ( @ 11:09)  T(F): 97.7, Max: 98.4 ( @ 11:09)  HR: 88 (88 - 96)  BP: 132/78 (130/80 - 150/84)  BP(mean): --  RR: 189 (16 - 189)  SpO2: 98% (94% - 98%)  Height (cm): 165.1 ( @ 11:09)  Weight (kg): 63.4 ( @ 11:09)  BMI (kg/m2): 23.3 ( @ 11:09)  BSA (m2): 1.7 ( @ 11:09)  GEN: NAD, pleasant  HEENT: normocephalic and atraumatic. EOMI. PACO.    NECK: Supple. No carotid bruits.  No lymphadenopathy or thyromegaly.  LUNGS: Clear to auscultation.  HEART: Regular rate and rhythm without murmur.  ABDOMEN: Soft, nontender, and nondistended.  Positive bowel sounds.    EXTREMITIES: Without any cyanosis, clubbing, rash, lesions or edema.  NEUROLOGIC: Cranial nerves II through XII are grossly intact.  PSYCHIATRIC: Appropriate affect .  SKIN: No ulceration or induration present.    =======================================================  Labs:  ====   10 2017 07:38    121    |  88     |  60.0   ----------------------------<  473    4.3     |  17.0   |  2.06     Ca    8.6        10 Feb 2017 07:38  Mg     1.8       2017 06:01    TPro  6.8    /  Alb  2.3    /  TBili  0.7    /  DBili  x      /  AST  13     /  ALT  9      /  AlkPhos  90     10 Feb 2017 07:38                          10.5   16.20 )-----------( 745      ( 10 Feb 2017 07:38 )             29.8     Urinalysis Basic - ( 2017 18:19 )    Color: Yellow / Appearance: Clear / S.020 / pH: x  Gluc: x / Ketone: Trace  / Bili: Negative / Urobili: Negative mg/dL   Blood: x / Protein: 500 mg/dL / Nitrite: Negative   Leuk Esterase: Small / RBC: 11-25 /HPF / WBC 6-10   Sq Epi: x / Non Sq Epi: Occasional / Bacteria: Few    LIVER FUNCTIONS - ( 10 Feb 2017 07:38 )  Alb: 2.3 g/dL / Pro: 6.8 g/dL / ALK PHOS: 90 U/L / ALT: 9 U/L / AST: 13 U/L / GGT: x           CARDIAC MARKERS ( 10 Feb 2017 07:38 )  x     / x     / 65 U/L / x     / x          CAPILLARY BLOOD GLUCOSE  525 (10 Feb 2017 02:52)    =====    =================================  EXAM:  CT CHEST                          PROCEDURE DATE:  2017        INTERPRETATION:  Clinical information: Dyspnea    Comparison: Multiple radiographs the last on     PROCEDURE:   CT of the Chest was performed without intravenous contrast.    FINDINGS:    CHEST:    CHEST WALL AND LOWER NECK: Within normal limits  MEDIASTINUM AND YOLY: Mildly enlarged mediastinal lymph nodes up to 1.4   cm, nonspecific.  HEART: Within normal limits  VESSELS: Unopacified great vessels are unremarkable.  LUNG AND LARGE AIRWAYS: Diffuse bilateral airspace opacities. No   endobronchial mass. Small bilateral pleural effusions. No    UPPER ABDOMEN: Left renal cyst, subcutaneous soft tissue edema.    MUSCULOSKELETAL: Within normal limits    IMPRESSION:     Diffuse bilateral alveolar airspace opacities and small pleural effusion,   nonspecific. The differential includes ARDS, pneumonia including   atypical, if immunocompromise PCP pneumonia, pulmonary hemorrhage and   other inflammatory pneumonitis. NPP INFECTIOUS DISEASES AND INTERNAL MEDICINE OF [place]  =======================================================  Jose Carlos Hoskins MD [facility]   Ming Nuñez MD  Diplomates American Board of Internal Medicine and Infectious Diseases  =======================================================    MRN-[id number]  JAROCHO MORALES is a 56y  Female   This 56 y.o F with DM2  on insulin, who presented here fro 2 months of abd pain, weakness, dark stools, found with anemia with HgB of 5grams/dL, with positive stool OB.  Patient was transfused with 3 units of PRBC during this admission.  She was found with concurrent Hyponatremia and hyperglycemia.     Gi evaluation and EGD pending due to SOB.  Normal echocardiogram, CT chest 17 showed  bilateral interstitial infiltrates, and primary team initiated vasculitis workup.    Patient had no fevers during the hospital course. She had been started empirically on antibiotics (Ceftriaxone and azithromycin) after the findings on CT scan on 17    At home, patient reports sputum production - white and green.  No sick contacts.  slight cough.  Flu vaccine this season    =======================================================  Past Medical & Surgical Hx:  =====================  PAST MEDICAL & SURGICAL HISTORY:  DM (diabetes mellitus)  No significant past surgical history      Problem List:  ==========  HEALTH ISSUES - PROBLEM Dx:  Pleural effusion: Pleural effusion  Prophylactic measure: Prophylactic measure  Leukocytosis: Leukocytosis  Shortness of breath at rest: Shortness of breath at rest  Hypoxia: Hypoxia  Lung infiltrate on CT: Lung infiltrate on CT  Bilious vomiting with nausea: Bilious vomiting with nausea  DM (diabetes mellitus): DM (diabetes mellitus)  MARSHALL (acute kidney injury): MARSHALL (acute kidney injury)  Acute respiratory distress: Acute respiratory distress  Anemia: Anemia  Hyponatremia: Hyponatremia  Type 2 diabetes mellitus with hyperglycemia, without long-term current use of insulin: Type 2 diabetes mellitus with hyperglycemia, without long-term current use of insulin  Abdominal pain of unknown cause: Abdominal pain of unknown cause  Anemia, unspecified type: Anemia, unspecified type      Social Hx:  =======  no current toxic habits      FAMILY HISTORY:  No pertinent family history in first degree relatives      Allergies  No Known Allergies  Intolerances    MEDICATIONS  (STANDING):  pantoprazole  Injectable 40milliGRAM(s) IV Push every 12 hours  azithromycin  IVPB  IV Intermittent   cefTRIAXone   IVPB  IV Intermittent   azithromycin  IVPB 500milliGRAM(s) IV Intermittent every 24 hours  cefTRIAXone   IVPB 1Gram(s) IV Intermittent every 24 hours  methylPREDNISolone sodium succinate Injectable 60milliGRAM(s) IV Push every 6 hours  heparin  Injectable 5000Unit(s) SubCutaneous every 12 hours  insulin glargine Injectable (LANTUS) 20Unit(s) SubCutaneous at bedtime  insulin lispro Injectable (HumaLOG) 15Unit(s) SubCutaneous three times a day before meals  dextrose 5%. 1000milliLiter(s) IV Continuous <Continuous>  dextrose 50% Injectable 12.5Gram(s) IV Push once  dextrose 50% Injectable 25Gram(s) IV Push once  dextrose 50% Injectable 25Gram(s) IV Push once    MEDICATIONS  (PRN):  ondansetron Injectable 4milliGRAM(s) IV Push every 6 hours PRN Nausea  hydrALAZINE 25milliGRAM(s) Oral every 6 hours PRN Systolic blood pressure >160  ALBUTerol    0.083% 2.5milliGRAM(s) Nebulizer every 6 hours PRN wheeze  dextrose Gel 1Dose(s) Oral once PRN Blood Glucose LESS THAN 70 milliGRAM(s)/deciliter  glucagon  Injectable 1milliGRAM(s) IntraMuscular once PRN Glucose LESS THAN 70 milligrams/deciliter            =======================================================  REVIEW OF SYSTEMS:  CONSTITUTIONAL:  as per HPI;   NO FEVER  HEENT:  Eyes:  No diplopia or blurred vision. ENT:  No earache, sore throat or runny nose.  CARDIOVASCULAR:  No pressure, squeezing, strangling, tightness, heaviness or aching about the chest, neck, axilla or epigastrium.  RESPIRATORY:   + SOB; + COUGH  GASTROINTESTINAL:  No nausea, vomiting or diarrhea.  GENITOURINARY:  No dysuria, frequency or urgency. No Blood in urine  MUSCULOSKELETAL:  no joint aches, no muscle pain  SKIN:  No change in skin, hair or nails.  NEUROLOGIC:  No paresthesias, fasciculations, seizures or weakness.  PSYCHIATRIC:  No disorder of thought or mood.  ENDOCRINE:  No heat or cold intolerance, polyuria or polydipsia.  HEMATOLOGICAL:  No easy bruising or bleeding.     =======================================================  I&O's Detail    I & Os for current day (as of 10 Feb 2017 10:23)  =============================================  IN:    Oral Fluid: 600 ml    sodium chloride 0.9%: 450 ml    Total IN: 1050 ml  ---------------------------------------------  OUT:    Voided: 350 ml    Total OUT: 350 ml  ---------------------------------------------  Total NET: 700 ml      Physical Exam:  ============  Vital Signs Last 24 Hrs  T(C): 36.5, Max: 36.9 ( @ 11:09)  T(F): 97.7, Max: 98.4 ( @ 11:09)  HR: 88 (88 - 96)  BP: 132/78 (130/80 - 150/84)  BP(mean): --  RR: 189 (16 - 189)  SpO2: 98% (94% - 98%)  Height (cm): 165.1 ( @ 11:09)  Weight (kg): 63.4 ( @ 11:09)  BMI (kg/m2): 23.3 ( @ 11:09)  BSA (m2): 1.7 ( @ 11:09)      GEN: NAD, pleasant  HEENT: normocephalic and atraumatic. EOMI. PACO, no significant pallor.    NECK: Supple. No carotid bruits.  No lymphadenopathy or thyromegaly.  LUNGS: coarse upper airway breath sounds  HEART: Regular rate and rhythm without murmur.  ABDOMEN: Soft, nontender, and nondistended.  Positive bowel sounds.    EXTREMITIES: Without any cyanosis, clubbing, rash, lesions or edema.  NEUROLOGIC: Cranial nerves II through XII are grossly intact.  PSYCHIATRIC: Appropriate affect .  SKIN: No ulceration or induration present.    =======================================================  Labs:  ====   10 Feb 2017 07:38    121    |  88     |  60.0   ----------------------------<  473    4.3     |  17.0   |  2.06     Ca    8.6        10 Feb 2017 07:38  Mg     1.8       2017 06:01    TPro  6.8    /  Alb  2.3    /  TBili  0.7    /  DBili  x      /  AST  13     /  ALT  9      /  AlkPhos  90     10 Feb 2017 07:38                          10.5   16.20 )-----------( 745      ( 10 Feb 2017 07:38 )             29.8     Urinalysis Basic - ( 2017 18:19 )    Color: Yellow / Appearance: Clear / S.020 / pH: x  Gluc: x / Ketone: Trace  / Bili: Negative / Urobili: Negative mg/dL   Blood: x / Protein: 500 mg/dL / Nitrite: Negative   Leuk Esterase: Small / RBC: 11-25 /HPF / WBC 6-10   Sq Epi: x / Non Sq Epi: Occasional / Bacteria: Few    LIVER FUNCTIONS - ( 10 Feb 2017 07:38 )  Alb: 2.3 g/dL / Pro: 6.8 g/dL / ALK PHOS: 90 U/L / ALT: 9 U/L / AST: 13 U/L / GGT: x           CARDIAC MARKERS ( 10 Feb 2017 07:38 )  x     / x     / 65 U/L / x     / x          CAPILLARY BLOOD GLUCOSE  525 (10 Feb 2017 02:52)    =====    =================================  EXAM:  CT CHEST                          PROCEDURE DATE:  2017        INTERPRETATION:  Clinical information: Dyspnea    Comparison: Multiple radiographs the last on     PROCEDURE:   CT of the Chest was performed without intravenous contrast.    FINDINGS:    CHEST:    CHEST WALL AND LOWER NECK: Within normal limits  MEDIASTINUM AND YOLY: Mildly enlarged mediastinal lymph nodes up to 1.4   cm, nonspecific.  HEART: Within normal limits  VESSELS: Unopacified great vessels are unremarkable.  LUNG AND LARGE AIRWAYS: Diffuse bilateral airspace opacities. No   endobronchial mass. Small bilateral pleural effusions. No    UPPER ABDOMEN: Left renal cyst, subcutaneous soft tissue edema.    MUSCULOSKELETAL: Within normal limits    IMPRESSION:     Diffuse bilateral alveolar airspace opacities and small pleural effusion,   nonspecific. The differential includes ARDS, pneumonia including   atypical, if immunocompromise PCP pneumonia, pulmonary hemorrhage and   other inflammatory pneumonitis.

## 2017-02-10 NOTE — PROCEDURE NOTE - NSPROCDETAILS_GEN_ALL_CORE
blood seen on insertion/dressing applied/flushes easily/sterile technique, catheter placed/ultrasound utilization/location identified, draped/prepped, sterile technique used

## 2017-02-10 NOTE — PROCEDURE NOTE - ADDITIONAL PROCEDURE DETAILS
Dr. Barnes ordered midline, however blood cultures are pending, so contacted her and changed to regular IV.

## 2017-02-10 NOTE — PROGRESS NOTE ADULT - SUBJECTIVE AND OBJECTIVE BOX
INTERVAL HPI/OVERNIGHT EVENTS:    CC: Weakness, anemia    Patient seen and examined, on venti mask. denies feeling short of breath or coughing. denies abdominal pain nausea or vomiting.     Vital Signs Last 24 Hrs  T(C): 36.5, Max: 36.9 (02-09 @ 11:09)  T(F): 97.7, Max: 98.4 (02-09 @ 11:09)  HR: 88 (88 - 96)  BP: 132/78 (130/80 - 150/84)  BP(mean): --  RR: 189 (16 - 189)  SpO2: 98% (94% - 98%)    PHYSICAL EXAM:    GENERAL: NAD,AOX3  HEAD:  Atraumatic, Normocephalic  EYES: EOMI, PERRLA, conjunctiva and sclera clear  ENMT: Moist mucous membranes  NECK: Supple, No JVD  CHEST/LUNG: Bilateral rhonchi   HEART: Regular rate and rhythm; No murmurs, rubs, or gallops  ABDOMEN: Soft, Nontender, Nondistended; Bowel sounds present  EXTREMITIES:  2+ Peripheral Pulses, No clubbing, cyanosis, or edema        MEDICATIONS  (STANDING):  pantoprazole  Injectable 40milliGRAM(s) IV Push every 12 hours  azithromycin  IVPB  IV Intermittent   cefTRIAXone   IVPB  IV Intermittent   azithromycin  IVPB 500milliGRAM(s) IV Intermittent every 24 hours  cefTRIAXone   IVPB 1Gram(s) IV Intermittent every 24 hours  methylPREDNISolone sodium succinate Injectable 60milliGRAM(s) IV Push every 6 hours  heparin  Injectable 5000Unit(s) SubCutaneous every 12 hours  insulin glargine Injectable (LANTUS) 20Unit(s) SubCutaneous at bedtime  insulin lispro Injectable (HumaLOG) 15Unit(s) SubCutaneous three times a day before meals  dextrose 5%. 1000milliLiter(s) IV Continuous <Continuous>  dextrose 50% Injectable 12.5Gram(s) IV Push once  dextrose 50% Injectable 25Gram(s) IV Push once  dextrose 50% Injectable 25Gram(s) IV Push once    MEDICATIONS  (PRN):  ondansetron Injectable 4milliGRAM(s) IV Push every 6 hours PRN Nausea  hydrALAZINE 25milliGRAM(s) Oral every 6 hours PRN Systolic blood pressure >160  ALBUTerol    0.083% 2.5milliGRAM(s) Nebulizer every 6 hours PRN wheeze  dextrose Gel 1Dose(s) Oral once PRN Blood Glucose LESS THAN 70 milliGRAM(s)/deciliter  glucagon  Injectable 1milliGRAM(s) IntraMuscular once PRN Glucose LESS THAN 70 milligrams/deciliter      Allergies    No Known Allergies    Intolerances          LABS:                          10.5   16.20 )-----------( 745      ( 10 Feb 2017 07:38 )             29.8     10 Feb 2017 07:38    121    |  88     |  60.0   ----------------------------<  473    4.3     |  17.0   |  2.06     Ca    8.6        10 Feb 2017 07:38  Mg     1.8       2017 06:01    TPro  6.8    /  Alb  2.3    /  TBili  0.7    /  DBili  x      /  AST  13     /  ALT  9      /  AlkPhos  90     10 Feb 2017 07:38      Urinalysis Basic - ( 2017 18:19 )    Color: Yellow / Appearance: Clear / S.020 / pH: x  Gluc: x / Ketone: Trace  / Bili: Negative / Urobili: Negative mg/dL   Blood: x / Protein: 500 mg/dL / Nitrite: Negative   Leuk Esterase: Small / RBC: 11-25 /HPF / WBC 6-10   Sq Epi: x / Non Sq Epi: Occasional / Bacteria: Few        RADIOLOGY & ADDITIONAL TESTS:

## 2017-02-10 NOTE — PROGRESS NOTE ADULT - PROBLEM SELECTOR PLAN 3
Etiology unclear but may be related to TRALI due to recent transfusion, ILD/AIP, possible pneumonia given b/l effusions  Complete abx  Continue steroids  F/u serologies

## 2017-02-10 NOTE — PROGRESS NOTE ADULT - ASSESSMENT
Na down to 121- cause unclear t/c Protonix  GI noted, signed off  shall decrease protonix to qd  add IVF  check Urine Na osm, uric acid  Vasculitis w/u already sent  labs am

## 2017-02-10 NOTE — PROGRESS NOTE ADULT - NSHPATTENDINGPLANDISCUSS_GEN_ALL_CORE
PMD/renal
patient and her son at the bedside in detail
Patient with  at the bedside
Patient and her son at the bedside with 
patient with  at the bedside
toro

## 2017-02-10 NOTE — PROGRESS NOTE ADULT - SUBJECTIVE AND OBJECTIVE BOX
Patient seen and examined  SOB less      I&O's Summary  I & Os for 24h ending 10 Feb 2017 07:00  =============================================  IN: 1050 ml / OUT: 350 ml / NET: 700 ml    I & Os for current day (as of 10 Feb 2017 20:03)  =============================================  IN: 780 ml / OUT: 350 ml / NET: 430 ml      REVIEW OF SYSTEMS:    CONSTITUTIONAL: No F/C    RESPIRATORY: Less cough and SOB  CARDIOVASCULAR: No CP/palpitations,    GASTROINTESTINAL: No abdominal , NVD   GENITOURINARY: No UTI sx  NEUROLOGICAL: No headaches/wk/numbness  MUSCULOSKELETAL: No joint pain/swelling; No LBP  EXTREMITIES : no swelling, pain    Vital Signs Last 24 Hrs  T(C): 36.8, Max: 36.8 (02-10 @ 15:41)  T(F): 98.2, Max: 98.2 (02-10 @ 15:41)  HR: 78 (78 - 92)  BP: 154/72 (132/78 - 154/72)  BP(mean): --  RR: 18 (16 - 18)  SpO2: 97% (97% - 98%)    PHYSICAL EXAM:    GENERAL: NAD,   EYES:  conjunctiva and sclera clear  NECK: Supple, No JVD/Bruit  NERVOUS SYSTEM:  A/O x3,   CHEST:  CTA ,No ralesfew scattered rrhonchi  HEART:  RRR, No murmurs  ABDOMEN: Soft, NT/ND BS+  EXTREMITIES:  No C/C/Edema; NT  SKIN: No rashes    LABS:                        10.5   16.20 )-----------( 745      ( 10 Feb 2017 07:38 )             29.8     10 Feb 2017 07:38    121    |  88     |  60.0   ----------------------------<  473    4.3     |  17.0   |  2.06     Ca    8.6        10 Feb 2017 07:38  Mg     1.8       09 Feb 2017 06:01    TPro  6.8    /  Alb  2.3    /  TBili  0.7    /  DBili  x      /  AST  13     /  ALT  9      /  AlkPhos  90     10 Feb 2017 07:38      MEDICATIONS  (STANDING):  ondansetron Injectable PRN  hydrALAZINE PRN  azithromycin  IVPB  cefTRIAXone   IVPB  azithromycin  IVPB  cefTRIAXone   IVPB  methylPREDNISolone sodium succinate Injectable  ALBUTerol    0.083% PRN  heparin  Injectable  insulin glargine Injectable (LANTUS)  insulin lispro Injectable (HumaLOG)  dextrose 5%.  dextrose Gel PRN  dextrose 50% Injectable  dextrose 50% Injectable  dextrose 50% Injectable  glucagon  Injectable PRN  pantoprazole  Injectable  sodium chloride 0.9%.  sodium chloride

## 2017-02-10 NOTE — PROGRESS NOTE ADULT - PROBLEM SELECTOR PLAN 1
Nausea and vomiting resolved. H+H stable. No active bleeding. Colonoscopy 1 years ago negative for masses. Patient having pulmonary evaluation for possible hemorrhage vs PNA. Will sign off .Pt may F?U in office as outpatient.

## 2017-02-11 LAB
ALBUMIN SERPL ELPH-MCNC: 2.1 G/DL — LOW (ref 3.3–5.2)
ALP SERPL-CCNC: 82 U/L — SIGNIFICANT CHANGE UP (ref 40–120)
ALT FLD-CCNC: 12 U/L — SIGNIFICANT CHANGE UP
ANA PAT FLD IF-IMP: ABNORMAL
ANA TITR SER: ABNORMAL
ANION GAP SERPL CALC-SCNC: 15 MMOL/L — SIGNIFICANT CHANGE UP (ref 5–17)
AST SERPL-CCNC: 23 U/L — SIGNIFICANT CHANGE UP
BILIRUB SERPL-MCNC: 0.6 MG/DL — SIGNIFICANT CHANGE UP (ref 0.4–2)
BUN SERPL-MCNC: 62 MG/DL — HIGH (ref 8–20)
CALCIUM SERPL-MCNC: 8.2 MG/DL — LOW (ref 8.6–10.2)
CHLORIDE SERPL-SCNC: 97 MMOL/L — LOW (ref 98–107)
CO2 SERPL-SCNC: 19 MMOL/L — LOW (ref 22–29)
CREAT SERPL-MCNC: 1.71 MG/DL — HIGH (ref 0.5–1.3)
GBM IGG SER-ACNC: <0.2 U — SIGNIFICANT CHANGE UP
GLUCOSE SERPL-MCNC: 233 MG/DL — HIGH (ref 70–115)
GRAM STN FLD: SIGNIFICANT CHANGE UP
HCT VFR BLD CALC: 29.2 % — LOW (ref 37–47)
HGB BLD-MCNC: 10 G/DL — LOW (ref 12–16)
MCHC RBC-ENTMCNC: 27.3 PG — SIGNIFICANT CHANGE UP (ref 27–31)
MCHC RBC-ENTMCNC: 34.2 G/DL — SIGNIFICANT CHANGE UP (ref 32–36)
MCV RBC AUTO: 79.8 FL — LOW (ref 81–99)
PLATELET # BLD AUTO: 576 K/UL — HIGH (ref 150–400)
POTASSIUM SERPL-MCNC: 4 MMOL/L — SIGNIFICANT CHANGE UP (ref 3.5–5.3)
POTASSIUM SERPL-SCNC: 4 MMOL/L — SIGNIFICANT CHANGE UP (ref 3.5–5.3)
PROT SERPL-MCNC: 6.1 G/DL — LOW (ref 6.6–8.7)
RBC # BLD: 3.66 M/UL — LOW (ref 4.4–5.2)
RBC # FLD: 14.4 % — SIGNIFICANT CHANGE UP (ref 11–15.6)
SODIUM SERPL-SCNC: 131 MMOL/L — LOW (ref 135–145)
SPECIMEN SOURCE: SIGNIFICANT CHANGE UP
URATE SERPL-MCNC: 8.6 MG/DL — HIGH (ref 2.4–5.7)
WBC # BLD: 12.28 K/UL — HIGH (ref 4.8–10.8)
WBC # FLD AUTO: 12.28 K/UL — HIGH (ref 4.8–10.8)

## 2017-02-11 PROCEDURE — 93971 EXTREMITY STUDY: CPT | Mod: 26,RT

## 2017-02-11 PROCEDURE — 99233 SBSQ HOSP IP/OBS HIGH 50: CPT

## 2017-02-11 RX ORDER — SODIUM CHLORIDE 9 MG/ML
1000 INJECTION, SOLUTION INTRAVENOUS
Qty: 0 | Refills: 0 | Status: DISCONTINUED | OUTPATIENT
Start: 2017-02-11 | End: 2017-02-15

## 2017-02-11 RX ORDER — DEXTROSE 50 % IN WATER 50 %
12.5 SYRINGE (ML) INTRAVENOUS ONCE
Qty: 0 | Refills: 0 | Status: DISCONTINUED | OUTPATIENT
Start: 2017-02-11 | End: 2017-02-15

## 2017-02-11 RX ORDER — INSULIN LISPRO 100/ML
VIAL (ML) SUBCUTANEOUS
Qty: 0 | Refills: 0 | Status: DISCONTINUED | OUTPATIENT
Start: 2017-02-11 | End: 2017-02-15

## 2017-02-11 RX ORDER — DEXTROSE 50 % IN WATER 50 %
25 SYRINGE (ML) INTRAVENOUS ONCE
Qty: 0 | Refills: 0 | Status: DISCONTINUED | OUTPATIENT
Start: 2017-02-11 | End: 2017-02-15

## 2017-02-11 RX ORDER — DEXTROSE 50 % IN WATER 50 %
1 SYRINGE (ML) INTRAVENOUS ONCE
Qty: 0 | Refills: 0 | Status: DISCONTINUED | OUTPATIENT
Start: 2017-02-11 | End: 2017-02-15

## 2017-02-11 RX ORDER — GLUCAGON INJECTION, SOLUTION 0.5 MG/.1ML
1 INJECTION, SOLUTION SUBCUTANEOUS ONCE
Qty: 0 | Refills: 0 | Status: DISCONTINUED | OUTPATIENT
Start: 2017-02-11 | End: 2017-02-15

## 2017-02-11 RX ADMIN — Medication 15 UNIT(S): at 17:32

## 2017-02-11 RX ADMIN — Medication 60 MILLIGRAM(S): at 15:31

## 2017-02-11 RX ADMIN — CEFTRIAXONE 100 GRAM(S): 500 INJECTION, POWDER, FOR SOLUTION INTRAMUSCULAR; INTRAVENOUS at 15:32

## 2017-02-11 RX ADMIN — HEPARIN SODIUM 5000 UNIT(S): 5000 INJECTION INTRAVENOUS; SUBCUTANEOUS at 17:32

## 2017-02-11 RX ADMIN — SODIUM CHLORIDE 1 GRAM(S): 9 INJECTION INTRAMUSCULAR; INTRAVENOUS; SUBCUTANEOUS at 06:31

## 2017-02-11 RX ADMIN — Medication 60 MILLIGRAM(S): at 06:31

## 2017-02-11 RX ADMIN — SODIUM CHLORIDE 80 MILLILITER(S): 9 INJECTION INTRAMUSCULAR; INTRAVENOUS; SUBCUTANEOUS at 22:57

## 2017-02-11 RX ADMIN — SODIUM CHLORIDE 1 GRAM(S): 9 INJECTION INTRAMUSCULAR; INTRAVENOUS; SUBCUTANEOUS at 22:56

## 2017-02-11 RX ADMIN — PANTOPRAZOLE SODIUM 40 MILLIGRAM(S): 20 TABLET, DELAYED RELEASE ORAL at 15:32

## 2017-02-11 RX ADMIN — AZITHROMYCIN 255 MILLIGRAM(S): 500 TABLET, FILM COATED ORAL at 16:14

## 2017-02-11 RX ADMIN — Medication 6: at 14:06

## 2017-02-11 RX ADMIN — Medication 8: at 17:31

## 2017-02-11 RX ADMIN — HEPARIN SODIUM 5000 UNIT(S): 5000 INJECTION INTRAVENOUS; SUBCUTANEOUS at 06:31

## 2017-02-11 RX ADMIN — SODIUM CHLORIDE 1 GRAM(S): 9 INJECTION INTRAMUSCULAR; INTRAVENOUS; SUBCUTANEOUS at 13:35

## 2017-02-11 RX ADMIN — Medication 15 UNIT(S): at 08:09

## 2017-02-11 RX ADMIN — Medication 60 MILLIGRAM(S): at 22:56

## 2017-02-11 RX ADMIN — INSULIN GLARGINE 20 UNIT(S): 100 INJECTION, SOLUTION SUBCUTANEOUS at 22:56

## 2017-02-11 RX ADMIN — Medication 15 UNIT(S): at 12:08

## 2017-02-11 NOTE — PROGRESS NOTE ADULT - PROBLEM SELECTOR PLAN 1
Stable status post transfusion 2 units prbc on 02/06/2017  No evidence of acute bleeding. EGD held until stable. Stable status post transfusion 2 units prbc on 02/06/2017  No evidence of acute bleeding. EGD held until stable, Hb has been stable, will continue monitor.

## 2017-02-11 NOTE — PROGRESS NOTE ADULT - ASSESSMENT
The patient is a 56 year old female with history of diabetes mellitus type 2 on insulin who presented to the ER from the office of her PMD. Seen and examined with a  at the bedside. According to the patient for the past 2 months she has had complaints of vomiting atleast 2-3 times a day associated with generalized abdominal pain. Her symptoms have progressively worsened and she has had associated weakness. She uses lantus at home for her diabetes and says she is compliant. She went to her PMD  with complaints of weakness and dark stool. Her hemoglobin was noted to be 5gm/dl therefore she was sent to the ER. In the ER hb is 6.7gm/dl with positive melena and guaic positive. Also noted to be hyperglycemic with hyponatremia given 2 liters of normal saline with insulin.     Evaluated by Gi, initially recommended EGD when hb/hct stable. However patient was noted to have hypoxia with increased o2 requirement. CT of the chest was done which showed bilateral interstitial infiltrates. Echocardiogram was showed decrased systolic function ef of 45%. Evlauated by Dr Meyers cardiology, recommends no diuresis at this time given renal insufficinecy and no cardiac work up reocmmended at this time.  Lower extremity duplex negative. Discussed with Dr Velez, recommended iV steroids and antibiotic coverage with nebs. Worsening renal failure and hyponatremia. Suspicion for underlying vascluitis. Dr sarmiento, nephrology consulted. Vasculitis work up and renal ultrasound odered. Hb/hct was stable with no active bleeding started on heparin subcut for dvt prophylaxis.     Lnatus and humalog increased to hyperglycemia secondary to steroids.

## 2017-02-11 NOTE — PROGRESS NOTE ADULT - SUBJECTIVE AND OBJECTIVE BOX
JAROCHO MORALES    [id number]    56y      Female    INTERVAL HPI/OVERNIGHT EVENTS:    REVIEW OF SYSTEMS:    CONSTITUTIONAL: No fever, weight loss, or fatigue  RESPIRATORY: No cough, wheezing, hemoptysis; No shortness of breath  CARDIOVASCULAR: No chest pain, palpitations  GASTROINTESTINAL: No abdominal or epigastric pain. No nausea, vomiting  NEUROLOGICAL: No headaches, memory loss, loss of strength.  MISCELLANEOUS:      Vital Signs Last 24 Hrs  T(C): 36.9, Max: 36.9 (02-10 @ 21:31)  T(F): 98.4, Max: 98.4 (02-10 @ 21:31)  HR: 86 (76 - 86)  BP: 120/60 (120/60 - 155/83)  BP(mean): --  RR: 16 (16 - 18)  SpO2: 96% (95% - 97%)    PHYSICAL EXAM:    GENERAL: Middle age female looking comfortable  HEAD:  Atraumatic, Normocephalic  EYES: EOMI, PERRLA, conjunctiva and sclera clear  ENMT: Moist mucous membranes  NECK: Supple, No JVD  CHEST/LUNG: Bilateral rhonchi   HEART: Regular rate and rhythm; No murmurs, rubs, or gallops  ABDOMEN: Soft, Nontender, Nondistended; Bowel sounds present  EXTREMITIES:  2+ Peripheral Pulses, No clubbing, cyanosis, or edema  NEURO: AAOX3, no focal deficits, no motor r sensory loss  PSYCH: normal mood      LABS:                        10.0   12.28 )-----------( 576      ( 2017 07:14 )             29.2     2017 07:14    131    |  97     |  62.0   ----------------------------<  233    4.0     |  19.0   |  1.71     Ca    8.2        2017 07:14    TPro  6.1    /  Alb  2.1    /  TBili  0.6    /  DBili  x      /  AST  23     /  ALT  12     /  AlkPhos  82     2017 07:14      Urinalysis Basic - ( 2017 18:19 )    Color: Yellow / Appearance: Clear / S.020 / pH: x  Gluc: x / Ketone: Trace  / Bili: Negative / Urobili: Negative mg/dL   Blood: x / Protein: 500 mg/dL / Nitrite: Negative   Leuk Esterase: Small / RBC: 11-25 /HPF / WBC 6-10   Sq Epi: x / Non Sq Epi: Occasional / Bacteria: Few          MEDICATIONS  (STANDING):  azithromycin  IVPB  IV Intermittent   cefTRIAXone   IVPB  IV Intermittent   azithromycin  IVPB 500milliGRAM(s) IV Intermittent every 24 hours  cefTRIAXone   IVPB 1Gram(s) IV Intermittent every 24 hours  methylPREDNISolone sodium succinate Injectable 60milliGRAM(s) IV Push every 6 hours  heparin  Injectable 5000Unit(s) SubCutaneous every 12 hours  insulin glargine Injectable (LANTUS) 20Unit(s) SubCutaneous at bedtime  insulin lispro Injectable (HumaLOG) 15Unit(s) SubCutaneous three times a day before meals  dextrose 5%. 1000milliLiter(s) IV Continuous <Continuous>  dextrose 50% Injectable 12.5Gram(s) IV Push once  dextrose 50% Injectable 25Gram(s) IV Push once  dextrose 50% Injectable 25Gram(s) IV Push once  pantoprazole  Injectable 40milliGRAM(s) IV Push daily  sodium chloride 0.9%. 1000milliLiter(s) IV Continuous <Continuous>  sodium chloride 1Gram(s) Oral three times a day    MEDICATIONS  (PRN):  ondansetron Injectable 4milliGRAM(s) IV Push every 6 hours PRN Nausea  hydrALAZINE 25milliGRAM(s) Oral every 6 hours PRN Systolic blood pressure >160  ALBUTerol    0.083% 2.5milliGRAM(s) Nebulizer every 6 hours PRN wheeze  dextrose Gel 1Dose(s) Oral once PRN Blood Glucose LESS THAN 70 milliGRAM(s)/deciliter  glucagon  Injectable 1milliGRAM(s) IntraMuscular once PRN Glucose LESS THAN 70 milligrams/deciliter      RADIOLOGY & ADDITIONAL TESTS: JAROCHO MORALES    [id number]    56y      Female    INTERVAL HPI/OVERNIGHT EVENTS:  She is feeling better, denies complaining of any sob, still on oxygen with venti mask, has no fever, chills, chest pain, nausea, vomiting.     REVIEW OF SYSTEMS:    CONSTITUTIONAL: No fever, some fatigue  RESPIRATORY: No cough, No shortness of breath  CARDIOVASCULAR: No chest pain, palpitations  GASTROINTESTINAL: No nausea, vomiting  NEUROLOGICAL: No headaches, loss of strength.  MISCELLANEOUS: No joint pain or swelling.       Vital Signs Last 24 Hrs  T(C): 36.9, Max: 36.9 (02-10 @ 21:31)  T(F): 98.4, Max: 98.4 (02-10 @ 21:31)  HR: 86 (76 - 86)  BP: 120/60 (120/60 - 155/83)  BP(mean): --  RR: 16 (16 - 18)  SpO2: 96% (95% - 97%)    PHYSICAL EXAM:    GENERAL: Middle age female looking comfortable  HEAD:  Atraumatic, Normocephalic  EYES: EOMI, PERRLA, conjunctiva and sclera clear  NECK: Supple, No JVD  CHEST/LUNG: Bilateral rhonchi   HEART: Regular rate and rhythm; No murmurs.   ABDOMEN: Soft, Nontender, Nondistended; Bowel sounds present  EXTREMITIES:  2+ Peripheral Pulses, No clubbing, cyanosis, or edema  NEURO: AAOX3, no focal deficits, no motor r sensory loss  PSYCH: normal mood      LABS:                        10.0   12.28 )-----------( 576      ( 2017 07:14 )             29.2     2017 07:14    131    |  97     |  62.0   ----------------------------<  233    4.0     |  19.0   |  1.71     Ca    8.2        2017 07:14    TPro  6.1    /  Alb  2.1    /  TBili  0.6    /  DBili  x      /  AST  23     /  ALT  12     /  AlkPhos  82     2017 07:14      Urinalysis Basic - ( 2017 18:19 )    Color: Yellow / Appearance: Clear / S.020 / pH: x  Gluc: x / Ketone: Trace  / Bili: Negative / Urobili: Negative mg/dL   Blood: x / Protein: 500 mg/dL / Nitrite: Negative   Leuk Esterase: Small / RBC: 11-25 /HPF / WBC 6-10   Sq Epi: x / Non Sq Epi: Occasional / Bacteria: Few          MEDICATIONS  (STANDING):  azithromycin  IVPB  IV Intermittent   cefTRIAXone   IVPB  IV Intermittent   azithromycin  IVPB 500milliGRAM(s) IV Intermittent every 24 hours  cefTRIAXone   IVPB 1Gram(s) IV Intermittent every 24 hours  methylPREDNISolone sodium succinate Injectable 60milliGRAM(s) IV Push every 6 hours  heparin  Injectable 5000Unit(s) SubCutaneous every 12 hours  insulin glargine Injectable (LANTUS) 20Unit(s) SubCutaneous at bedtime  insulin lispro Injectable (HumaLOG) 15Unit(s) SubCutaneous three times a day before meals  dextrose 5%. 1000milliLiter(s) IV Continuous <Continuous>  dextrose 50% Injectable 12.5Gram(s) IV Push once  dextrose 50% Injectable 25Gram(s) IV Push once  dextrose 50% Injectable 25Gram(s) IV Push once  pantoprazole  Injectable 40milliGRAM(s) IV Push daily  sodium chloride 0.9%. 1000milliLiter(s) IV Continuous <Continuous>  sodium chloride 1Gram(s) Oral three times a day    MEDICATIONS  (PRN):  ondansetron Injectable 4milliGRAM(s) IV Push every 6 hours PRN Nausea  hydrALAZINE 25milliGRAM(s) Oral every 6 hours PRN Systolic blood pressure >160  ALBUTerol    0.083% 2.5milliGRAM(s) Nebulizer every 6 hours PRN wheeze  dextrose Gel 1Dose(s) Oral once PRN Blood Glucose LESS THAN 70 milliGRAM(s)/deciliter  glucagon  Injectable 1milliGRAM(s) IntraMuscular once PRN Glucose LESS THAN 70 milligrams/deciliter      RADIOLOGY & ADDITIONAL TESTS:

## 2017-02-11 NOTE — PROGRESS NOTE ADULT - PROBLEM SELECTOR PLAN 5
[place]   Renal on consult. Renal is on board, anti GBM antibody negative, will monitor BMP, I/O and daily weight.

## 2017-02-11 NOTE — PROGRESS NOTE ADULT - PROBLEM SELECTOR PLAN 3
Etiology unclear but may be related to TRALI due to recent transfusion, ILD/AIP, possible pneumonia given b/l effusions  Complete abx  F/u CXR  Continue steroids  F/u serologies

## 2017-02-11 NOTE — PROGRESS NOTE ADULT - PROBLEM SELECTOR PLAN 2
Increase lantus to 20 units at QHS  Add pre meal humalog 15 units TID   Uncontrolled DM Increase lantus to 20 units at QHS, meal humalog 15 units TID   Uncontrolled DM, will continue monitoring FS.

## 2017-02-11 NOTE — PROGRESS NOTE ADULT - SUBJECTIVE AND OBJECTIVE BOX
NEPHROLOGY INTERVAL HPI/OVERNIGHT EVENTS:  pt seen this AM  more comfortable; less SOB    MEDICATIONS  (STANDING):  azithromycin  IVPB  IV Intermittent   cefTRIAXone   IVPB  IV Intermittent   azithromycin  IVPB 500milliGRAM(s) IV Intermittent every 24 hours  cefTRIAXone   IVPB 1Gram(s) IV Intermittent every 24 hours  methylPREDNISolone sodium succinate Injectable 60milliGRAM(s) IV Push every 6 hours  heparin  Injectable 5000Unit(s) SubCutaneous every 12 hours  insulin glargine Injectable (LANTUS) 20Unit(s) SubCutaneous at bedtime  insulin lispro Injectable (HumaLOG) 15Unit(s) SubCutaneous three times a day before meals  dextrose 5%. 1000milliLiter(s) IV Continuous <Continuous>  dextrose 50% Injectable 12.5Gram(s) IV Push once  dextrose 50% Injectable 25Gram(s) IV Push once  dextrose 50% Injectable 25Gram(s) IV Push once  pantoprazole  Injectable 40milliGRAM(s) IV Push daily  sodium chloride 0.9%. 1000milliLiter(s) IV Continuous <Continuous>  sodium chloride 1Gram(s) Oral three times a day    MEDICATIONS  (PRN):  ondansetron Injectable 4milliGRAM(s) IV Push every 6 hours PRN Nausea  hydrALAZINE 25milliGRAM(s) Oral every 6 hours PRN Systolic blood pressure >160  ALBUTerol    0.083% 2.5milliGRAM(s) Nebulizer every 6 hours PRN wheeze  dextrose Gel 1Dose(s) Oral once PRN Blood Glucose LESS THAN 70 milliGRAM(s)/deciliter  glucagon  Injectable 1milliGRAM(s) IntraMuscular once PRN Glucose LESS THAN 70 milligrams/deciliter      Allergies    No Known Allergies      Vital Signs Last 24 Hrs  T(C): 36.9, Max: 36.9 (02-10 @ 21:31)  T(F): 98.4, Max: 98.4 (02-10 @ 21:31)  HR: 86 (76 - 86)  BP: 120/60 (120/60 - 155/83)  BP(mean): --  RR: 16 (16 - 18)  SpO2: 96% (95% - 97%)    PHYSICAL EXAM:    GENERAL: +sob  HEAD:  Atraumatic, Normocephalic  EYES: EOMI, PERRLA, conjunctiva and sclera clear  ENMT: O2 mask  NECK: Supple, No JVD  NERVOUS SYSTEM:  Alert & Oriented X3,  intact and symmetric  CHEST/LUNG: Diminished BS bilaterally   HEART: Regular rate and rhythm; No murmurs, rubs, or gallops  ABDOMEN: Soft, +BS  EXTREMITIES:  2+ Peripheral Pulses, No clubbing, cyanosis, or edema  SKIN: No rashes or lesions  LABS:                        10.0   12.28 )-----------( 576      ( 11 Feb 2017 07:14 )             29.2     11 Feb 2017 07:14    131    |  97     |  62.0   ----------------------------<  233    4.0     |  19.0   |  1.71     Ca    8.2        11 Feb 2017 07:14    TPro  6.1    /  Alb  2.1    /  TBili  0.6    /  DBili  x      /  AST  23     /  ALT  12     /  AlkPhos  82     11 Feb 2017 07:14    Urinalysis (02.09.17 @ 18:19)    pH Urine: 5.0    Blood, Urine: Large    Glucose Qualitative, Urine: 1000 mg/dL    Color: Yellow    Urine Appearance: Clear    Bilirubin: Negative    Ketone - Urine: Trace    Specific Gravity: 1.020    Protein, Urine: 500 mg/dL    Urobilinogen: Negative mg/dL    Nitrite: Negative    Leukocyte Esterase Concentration: Small      Glomerular Basement Membrane Ab IgG: <0.2: Test Performed by:  [place] [address]  : Srini Red II, M.D., Ph.D. U (02.09.17 @ 20:02)    HUGO Pattern: Speckled (02.09.17 @ 16:20)                       1:640  Double Stranded DNA Antibody: <12: Method: EIA                      RADIOLOGY & ADDITIONAL TESTS:   EXAM:  US KIDNEY(S)                          PROCEDURE DATE:  02/09/2017        INTERPRETATION:  CLINICAL HISTORY: Renal insufficiency.    TECHNIQUE: Renal Sonography.     COMPARISON: None    FINDINGS: The right kidney displays increased cortical echogenicity   consistent with chronic medical renal disease. The right kidney measures   9.4 cm in longitudinal dimension. No right hydronephrosis, mass or   calculi is visualized. Upper pole cyst measures 1.5 cm.    The left kidney displays increased cortical echogenicity consistent with   chronic medical renal disease. The left kidney measures 10.9 cm in   longitudinal dimension. No right hydronephrosis, mass or calculi is   visualized. Midpole cyst measures 1.5 cm.    The bladder is measures 8.8 x 7 x 7 x 4.2 cm. Left ureteral jet   visualized. Right ureteral jet not seen. No gross intravesical mass.      IMPRESSION:  Bilateral increased cortical echogenicity consistent with chronic medical   renal disease.  No hydronephrosis, mass or calculus.

## 2017-02-11 NOTE — PROGRESS NOTE ADULT - PROBLEM SELECTOR PLAN 4
Bilateral pulmonary infiltrates with anemia and renal failure.   Rule out vasculitis vs TRALI  Renal and pulmonary on consult  ON IV steroids  IV rocephin and azithromycin day 3  ID on consult  Pan culture Bilateral pulmonary infiltrates with anemia and renal failure.   Rule out vasculitis vs TRALI  Renal and pulmonary on consult  ON IV steroids  IV rocephin and azithromycin day 4  ID on consulted continue with current antibiotics, will slowly wean down the oxygen, will send cultures of blood and sputum as it was not done initially, patient Rheumatological work up has been negative so for like Anti DS DNA -ve, HUGO unremarkable, pulmonary is on board, will repeat CXR.

## 2017-02-11 NOTE — PROGRESS NOTE ADULT - ASSESSMENT
1) ARF: r/o chronic component ==> suggested by echogenic kidneys on sono              ? vasculitis ==> +HUGO 1:640 (negative ds DNA)                                       anti GBM negative               - check remaining serologies               - monitor renal function               - consider Rheum evaluation    2) Hyponatremia: improved                - optimal glucose control                - avoid excessive hypotonic fluids

## 2017-02-12 LAB
ALBUMIN SERPL ELPH-MCNC: 2.3 G/DL — LOW (ref 3.3–5.2)
ALP SERPL-CCNC: 86 U/L — SIGNIFICANT CHANGE UP (ref 40–120)
ALT FLD-CCNC: 15 U/L — SIGNIFICANT CHANGE UP
ANION GAP SERPL CALC-SCNC: 15 MMOL/L — SIGNIFICANT CHANGE UP (ref 5–17)
AST SERPL-CCNC: 22 U/L — SIGNIFICANT CHANGE UP
BILIRUB SERPL-MCNC: 0.6 MG/DL — SIGNIFICANT CHANGE UP (ref 0.4–2)
BUN SERPL-MCNC: 60 MG/DL — HIGH (ref 8–20)
CALCIUM SERPL-MCNC: 8.3 MG/DL — LOW (ref 8.6–10.2)
CHLORIDE SERPL-SCNC: 99 MMOL/L — SIGNIFICANT CHANGE UP (ref 98–107)
CO2 SERPL-SCNC: 18 MMOL/L — LOW (ref 22–29)
CREAT SERPL-MCNC: 1.55 MG/DL — HIGH (ref 0.5–1.3)
GLUCOSE SERPL-MCNC: 181 MG/DL — HIGH (ref 70–115)
HCT VFR BLD CALC: 29 % — LOW (ref 37–47)
HGB BLD-MCNC: 10.1 G/DL — LOW (ref 12–16)
MCHC RBC-ENTMCNC: 27.7 PG — SIGNIFICANT CHANGE UP (ref 27–31)
MCHC RBC-ENTMCNC: 34.8 G/DL — SIGNIFICANT CHANGE UP (ref 32–36)
MCV RBC AUTO: 79.7 FL — LOW (ref 81–99)
PLATELET # BLD AUTO: 472 K/UL — HIGH (ref 150–400)
POTASSIUM SERPL-MCNC: 4 MMOL/L — SIGNIFICANT CHANGE UP (ref 3.5–5.3)
POTASSIUM SERPL-SCNC: 4 MMOL/L — SIGNIFICANT CHANGE UP (ref 3.5–5.3)
PROT SERPL-MCNC: 6 G/DL — LOW (ref 6.6–8.7)
RBC # BLD: 3.64 M/UL — LOW (ref 4.4–5.2)
RBC # FLD: 14.2 % — SIGNIFICANT CHANGE UP (ref 11–15.6)
S PNEUM AG SER QL: SIGNIFICANT CHANGE UP
SODIUM SERPL-SCNC: 132 MMOL/L — LOW (ref 135–145)
WBC # BLD: 10.05 K/UL — SIGNIFICANT CHANGE UP (ref 4.8–10.8)
WBC # FLD AUTO: 10.05 K/UL — SIGNIFICANT CHANGE UP (ref 4.8–10.8)

## 2017-02-12 PROCEDURE — 99232 SBSQ HOSP IP/OBS MODERATE 35: CPT

## 2017-02-12 RX ADMIN — PANTOPRAZOLE SODIUM 40 MILLIGRAM(S): 20 TABLET, DELAYED RELEASE ORAL at 11:05

## 2017-02-12 RX ADMIN — SODIUM CHLORIDE 1 GRAM(S): 9 INJECTION INTRAMUSCULAR; INTRAVENOUS; SUBCUTANEOUS at 06:39

## 2017-02-12 RX ADMIN — Medication 60 MILLIGRAM(S): at 00:35

## 2017-02-12 RX ADMIN — Medication 15 UNIT(S): at 12:52

## 2017-02-12 RX ADMIN — HEPARIN SODIUM 5000 UNIT(S): 5000 INJECTION INTRAVENOUS; SUBCUTANEOUS at 06:39

## 2017-02-12 RX ADMIN — Medication 4: at 17:56

## 2017-02-12 RX ADMIN — CEFTRIAXONE 100 GRAM(S): 500 INJECTION, POWDER, FOR SOLUTION INTRAMUSCULAR; INTRAVENOUS at 14:00

## 2017-02-12 RX ADMIN — HEPARIN SODIUM 5000 UNIT(S): 5000 INJECTION INTRAVENOUS; SUBCUTANEOUS at 17:57

## 2017-02-12 RX ADMIN — Medication 2: at 08:59

## 2017-02-12 RX ADMIN — Medication 15 UNIT(S): at 08:59

## 2017-02-12 RX ADMIN — Medication 60 MILLIGRAM(S): at 23:01

## 2017-02-12 RX ADMIN — SODIUM CHLORIDE 1 GRAM(S): 9 INJECTION INTRAMUSCULAR; INTRAVENOUS; SUBCUTANEOUS at 14:01

## 2017-02-12 RX ADMIN — SODIUM CHLORIDE 80 MILLILITER(S): 9 INJECTION INTRAMUSCULAR; INTRAVENOUS; SUBCUTANEOUS at 07:06

## 2017-02-12 RX ADMIN — Medication 2: at 12:52

## 2017-02-12 RX ADMIN — INSULIN GLARGINE 20 UNIT(S): 100 INJECTION, SOLUTION SUBCUTANEOUS at 21:48

## 2017-02-12 RX ADMIN — Medication 60 MILLIGRAM(S): at 17:56

## 2017-02-12 RX ADMIN — Medication 60 MILLIGRAM(S): at 06:39

## 2017-02-12 RX ADMIN — SODIUM CHLORIDE 1 GRAM(S): 9 INJECTION INTRAMUSCULAR; INTRAVENOUS; SUBCUTANEOUS at 21:48

## 2017-02-12 RX ADMIN — Medication 60 MILLIGRAM(S): at 11:05

## 2017-02-12 RX ADMIN — Medication 15 UNIT(S): at 17:56

## 2017-02-12 RX ADMIN — AZITHROMYCIN 255 MILLIGRAM(S): 500 TABLET, FILM COATED ORAL at 14:45

## 2017-02-12 RX ADMIN — SODIUM CHLORIDE 80 MILLILITER(S): 9 INJECTION INTRAMUSCULAR; INTRAVENOUS; SUBCUTANEOUS at 23:00

## 2017-02-12 NOTE — PROGRESS NOTE ADULT - PROBLEM SELECTOR PLAN 3
Etiology unclear but may be related to TRALI due to recent transfusion, ILD/AIP, possible pneumonia given b/l effusions  Complete abx  Awaiting f/u CXR - was ordered for 2/11  Continue steroids - will decrease as tolerates  F/u serologies - + HUGO so would consider rheumatology evaluation

## 2017-02-12 NOTE — PROGRESS NOTE ADULT - PROBLEM SELECTOR PLAN 2
Increase lantus to 20 units at QHS, meal humalog 15 units TID   Uncontrolled DM, will continue monitoring FS, will give coverage insulin.

## 2017-02-12 NOTE — PROGRESS NOTE ADULT - SUBJECTIVE AND OBJECTIVE BOX
NEPHROLOGY INTERVAL HPI/OVERNIGHT EVENTS:  pt resting comfortably earlier  still with supplemental O2  no acute distress    MEDICATIONS  (STANDING):  azithromycin  IVPB  IV Intermittent   cefTRIAXone   IVPB  IV Intermittent   azithromycin  IVPB 500milliGRAM(s) IV Intermittent every 24 hours  cefTRIAXone   IVPB 1Gram(s) IV Intermittent every 24 hours  methylPREDNISolone sodium succinate Injectable 60milliGRAM(s) IV Push every 6 hours  heparin  Injectable 5000Unit(s) SubCutaneous every 12 hours  insulin glargine Injectable (LANTUS) 20Unit(s) SubCutaneous at bedtime  insulin lispro Injectable (HumaLOG) 15Unit(s) SubCutaneous three times a day before meals  dextrose 5%. 1000milliLiter(s) IV Continuous <Continuous>  dextrose 50% Injectable 12.5Gram(s) IV Push once  dextrose 50% Injectable 25Gram(s) IV Push once  dextrose 50% Injectable 25Gram(s) IV Push once  pantoprazole  Injectable 40milliGRAM(s) IV Push daily  sodium chloride 0.9%. 1000milliLiter(s) IV Continuous <Continuous>  sodium chloride 1Gram(s) Oral three times a day  insulin lispro (HumaLOG) corrective regimen sliding scale  SubCutaneous three times a day before meals  dextrose 5%. 1000milliLiter(s) IV Continuous <Continuous>  dextrose 50% Injectable 12.5Gram(s) IV Push once  dextrose 50% Injectable 25Gram(s) IV Push once  dextrose 50% Injectable 25Gram(s) IV Push once    MEDICATIONS  (PRN):  ondansetron Injectable 4milliGRAM(s) IV Push every 6 hours PRN Nausea  hydrALAZINE 25milliGRAM(s) Oral every 6 hours PRN Systolic blood pressure >160  ALBUTerol    0.083% 2.5milliGRAM(s) Nebulizer every 6 hours PRN wheeze  dextrose Gel 1Dose(s) Oral once PRN Blood Glucose LESS THAN 70 milliGRAM(s)/deciliter  glucagon  Injectable 1milliGRAM(s) IntraMuscular once PRN Glucose LESS THAN 70 milligrams/deciliter  dextrose Gel 1Dose(s) Oral once PRN Blood Glucose LESS THAN 70 milliGRAM(s)/deciliter  glucagon  Injectable 1milliGRAM(s) IntraMuscular once PRN Glucose LESS THAN 70 milligrams/deciliter      Allergies    No Known Allergies    Intolerances          Vital Signs Last 24 Hrs  T(C): 36.3, Max: 36.4 (02-11 @ 21:07)  T(F): 97.3, Max: 97.5 (02-11 @ 21:07)  HR: 64 (64 - 86)  BP: 126/70 (120/60 - 142/70)  BP(mean): --  RR: 18 (16 - 18)  SpO2: 94% (88% - 99%)    PHYSICAL EXAM:  GENERAL: +sob  HEAD:  Atraumatic, Normocephalic  EYES: EOMI, PERRLA, conjunctiva and sclera clear  ENMT: O2 mask  NECK: Supple, No JVD  NERVOUS SYSTEM:  Alert & Oriented X3,  intact and symmetric  CHEST/LUNG: Diminished BS bilaterally   HEART: Regular rate and rhythm; No murmurs, rubs, or gallops  ABDOMEN: Soft, +BS  EXTREMITIES:  2+ Peripheral Pulses, No clubbing, cyanosis, or edema  SKIN: No rashes or lesions      LABS:                        10.1   10.05 )-----------( 472      ( 12 Feb 2017 06:21 )             29.0     12 Feb 2017 06:21    132    |  99     |  60.0   ----------------------------<  181    4.0     |  18.0   |  1.55     Ca    8.3        12 Feb 2017 06:21    TPro  6.0    /  Alb  2.3    /  TBili  0.6    /  DBili  x      /  AST  22     /  ALT  15     /  AlkPhos  86     12 Feb 2017 06:21            RADIOLOGY & ADDITIONAL TESTS:

## 2017-02-12 NOTE — PROGRESS NOTE ADULT - SUBJECTIVE AND OBJECTIVE BOX
JAROCHO MORALES    [id number]    56y      Female    INTERVAL HPI/OVERNIGHT EVENTS:    She is feeling better, still require oxygen with venti mask, denies complaining of any sob, has no fever, chills, chest pain, nausea, vomiting, cough.      REVIEW OF SYSTEMS:    CONSTITUTIONAL: No fever, some fatigue  RESPIRATORY: No cough, has some shortness of breath  CARDIOVASCULAR: No chest pain, palpitations  GASTROINTESTINAL: No nausea, vomiting  NEUROLOGICAL: No headaches, loss of strength.  MISCELLANEOUS: No joint pain or swelling.     Vital Signs Last 24 Hrs  T(C): 36.3, Max: 36.4 (02-11 @ 21:07)  T(F): 97.4, Max: 97.5 (02-11 @ 21:07)  HR: 64 (64 - 71)  BP: 104/78 (104/78 - 142/70)  BP(mean): --  RR: 16 (16 - 18)  SpO2: 96% (88% - 99%)    PHYSICAL EXAM:    GENERAL: Middle age female looking comfortable  HEAD:  Atraumatic, Normocephalic  EYES: EOMI, PERRLA,  NECK: Supple, No JVD  CHEST/LUNG: Bilateral decrease air entry with some rhonchi.    HEART: Regular rate and rhythm; No murmurs.   ABDOMEN: Soft, Nontender, Nondistended; Bowel sounds present  EXTREMITIES:  2+ Peripheral Pulses, No clubbing, cyanosis, or edema  NEURO: AAOX3, no focal deficits, no motor r sensory loss  PSYCH: normal mood      LABS:                        10.1   10.05 )-----------( 472      ( 12 Feb 2017 06:21 )             29.0     12 Feb 2017 06:21    132    |  99     |  60.0   ----------------------------<  181    4.0     |  18.0   |  1.55     Ca    8.3        12 Feb 2017 06:21    TPro  6.0    /  Alb  2.3    /  TBili  0.6    /  DBili  x      /  AST  22     /  ALT  15     /  AlkPhos  86     12 Feb 2017 06:21    MEDICATIONS  (STANDING):  azithromycin  IVPB  IV Intermittent   cefTRIAXone   IVPB  IV Intermittent   azithromycin  IVPB 500milliGRAM(s) IV Intermittent every 24 hours  cefTRIAXone   IVPB 1Gram(s) IV Intermittent every 24 hours  methylPREDNISolone sodium succinate Injectable 60milliGRAM(s) IV Push every 6 hours  heparin  Injectable 5000Unit(s) SubCutaneous every 12 hours  insulin glargine Injectable (LANTUS) 20Unit(s) SubCutaneous at bedtime  insulin lispro Injectable (HumaLOG) 15Unit(s) SubCutaneous three times a day before meals  dextrose 5%. 1000milliLiter(s) IV Continuous <Continuous>  dextrose 50% Injectable 12.5Gram(s) IV Push once  dextrose 50% Injectable 25Gram(s) IV Push once  dextrose 50% Injectable 25Gram(s) IV Push once  pantoprazole  Injectable 40milliGRAM(s) IV Push daily  sodium chloride 0.9%. 1000milliLiter(s) IV Continuous <Continuous>  sodium chloride 1Gram(s) Oral three times a day  insulin lispro (HumaLOG) corrective regimen sliding scale  SubCutaneous three times a day before meals  dextrose 5%. 1000milliLiter(s) IV Continuous <Continuous>  dextrose 50% Injectable 12.5Gram(s) IV Push once  dextrose 50% Injectable 25Gram(s) IV Push once  dextrose 50% Injectable 25Gram(s) IV Push once    MEDICATIONS  (PRN):  ondansetron Injectable 4milliGRAM(s) IV Push every 6 hours PRN Nausea  hydrALAZINE 25milliGRAM(s) Oral every 6 hours PRN Systolic blood pressure >160  ALBUTerol    0.083% 2.5milliGRAM(s) Nebulizer every 6 hours PRN wheeze  dextrose Gel 1Dose(s) Oral once PRN Blood Glucose LESS THAN 70 milliGRAM(s)/deciliter  glucagon  Injectable 1milliGRAM(s) IntraMuscular once PRN Glucose LESS THAN 70 milligrams/deciliter  dextrose Gel 1Dose(s) Oral once PRN Blood Glucose LESS THAN 70 milliGRAM(s)/deciliter  glucagon  Injectable 1milliGRAM(s) IntraMuscular once PRN Glucose LESS THAN 70 milligrams/deciliter    Renal Sonography:   Bilateral increased cortical echogenicity consistent with chronic medical   renal disease, No hydronephrosis, mass or calculus.

## 2017-02-12 NOTE — PROGRESS NOTE ADULT - PROBLEM SELECTOR PLAN 8
Etiology of b/l effusion is unclear but may be related to CHF given mild CM, b/l effusions and elevated BNP  Diurese as tolerates  Optimize cardiac function

## 2017-02-12 NOTE — PROGRESS NOTE ADULT - PROBLEM SELECTOR PLAN 1
Stable status post transfusion 2 units prbc on 02/06/2017  No evidence of acute bleeding. EGD held until stable, Hb has been stable, will continue monitor.

## 2017-02-12 NOTE — PROGRESS NOTE ADULT - SUBJECTIVE AND OBJECTIVE BOX
PULMONARY PROGRESS NOTE      JAROCHO MORALES  MRN-[id number]    Patient is a 56y old  Female who presents with a chief complaint of dark stools (07 Feb 2017 11:01)    INTERVAL HPI/OVERNIGHT EVENTS:    Patient improving with decreased SOB and cough.    MEDICATIONS  (STANDING):  azithromycin  IVPB  IV Intermittent   cefTRIAXone   IVPB  IV Intermittent   azithromycin  IVPB 500milliGRAM(s) IV Intermittent every 24 hours  cefTRIAXone   IVPB 1Gram(s) IV Intermittent every 24 hours  methylPREDNISolone sodium succinate Injectable 60milliGRAM(s) IV Push every 6 hours  heparin  Injectable 5000Unit(s) SubCutaneous every 12 hours  insulin glargine Injectable (LANTUS) 20Unit(s) SubCutaneous at bedtime  insulin lispro Injectable (HumaLOG) 15Unit(s) SubCutaneous three times a day before meals  dextrose 5%. 1000milliLiter(s) IV Continuous <Continuous>  dextrose 50% Injectable 12.5Gram(s) IV Push once  dextrose 50% Injectable 25Gram(s) IV Push once  dextrose 50% Injectable 25Gram(s) IV Push once  pantoprazole  Injectable 40milliGRAM(s) IV Push daily  sodium chloride 0.9%. 1000milliLiter(s) IV Continuous <Continuous>  sodium chloride 1Gram(s) Oral three times a day  insulin lispro (HumaLOG) corrective regimen sliding scale  SubCutaneous three times a day before meals  dextrose 5%. 1000milliLiter(s) IV Continuous <Continuous>  dextrose 50% Injectable 12.5Gram(s) IV Push once  dextrose 50% Injectable 25Gram(s) IV Push once  dextrose 50% Injectable 25Gram(s) IV Push once      MEDICATIONS  (PRN):  ondansetron Injectable 4milliGRAM(s) IV Push every 6 hours PRN Nausea  hydrALAZINE 25milliGRAM(s) Oral every 6 hours PRN Systolic blood pressure >160  ALBUTerol    0.083% 2.5milliGRAM(s) Nebulizer every 6 hours PRN wheeze  dextrose Gel 1Dose(s) Oral once PRN Blood Glucose LESS THAN 70 milliGRAM(s)/deciliter  glucagon  Injectable 1milliGRAM(s) IntraMuscular once PRN Glucose LESS THAN 70 milligrams/deciliter  dextrose Gel 1Dose(s) Oral once PRN Blood Glucose LESS THAN 70 milliGRAM(s)/deciliter  glucagon  Injectable 1milliGRAM(s) IntraMuscular once PRN Glucose LESS THAN 70 milligrams/deciliter      Allergies    No Known Allergies    Intolerances        PAST MEDICAL & SURGICAL HISTORY:  DM (diabetes mellitus)  No significant past surgical history        REVIEW OF SYSTEMS:    CONSTITUTIONAL:  No distress    HEENT:  Eyes:  No diplopia or blurred vision. ENT:  No earache, sore throat or runny nose.    CARDIOVASCULAR:  No pressure, squeezing, tightness, heaviness or aching about the chest; no palpitations.    RESPIRATORY:  Improved cough, shortness of breath, no PND or orthopnea. Mild SOBOE    GASTROINTESTINAL:  No nausea, vomiting or diarrhea.    GENITOURINARY:  No dysuria, frequency or urgency.    NEUROLOGIC:  No paresthesias, fasciculations, seizures or weakness.    PSYCHIATRIC:  No disorder of thought or mood.    Vital Signs Last 24 Hrs  T(C): 36.3, Max: 36.4 (02-11 @ 21:07)  T(F): 97.4, Max: 97.5 (02-11 @ 21:07)  HR: 64 (64 - 71)  BP: 125/80 (125/80 - 142/70)  BP(mean): --  RR: 17 (16 - 18)  SpO2: 98% (88% - 99%)    PHYSICAL EXAMINATION:    GENERAL: The patient is awake and alert in no apparent distress.     HEENT: Head is normocephalic and atraumatic. Extraocular muscles are intact. Mucous membranes are moist.    NECK: Supple.    LUNGS: Essentially clear; respirations unlabored    HEART: Regular rate and rhythm without murmur.    ABDOMEN: Soft, nontender, and nondistended.      EXTREMITIES: Without any cyanosis, clubbing, rash, lesions or edema.    NEUROLOGIC: Grossly intact.    LABS:                        10.1   10.05 )-----------( 472      ( 12 Feb 2017 06:21 )             29.0     12 Feb 2017 06:21    132    |  99     |  60.0   ----------------------------<  181    4.0     |  18.0   |  1.55     Ca    8.3        12 Feb 2017 06:21    TPro  6.0    /  Alb  2.3    /  TBili  0.6    /  DBili  x      /  AST  22     /  ALT  15     /  AlkPhos  86     12 Feb 2017 06:21      Serum Pro-Brain Natriuretic Peptide: 5278 pg/mL (02-09 @ 14:37)    MICROBIOLOGY:    Culture - Sputum . (02.11.17 @ 10:27)    Gram Stain:   Moderate White blood cells  Moderate Yeast  Few Gram positive cocci in pairs    Specimen Source: .Sputum Sputum    Culture Results:   Numerous Candida species  Numerous Routine respiratory puma present  Culture in progress    RADIOLOGY & ADDITIONAL STUDIES:     EXAM:  CT CHEST                          PROCEDURE DATE:  02/08/2017        INTERPRETATION:  Clinical information: Dyspnea    Comparison: Multiple radiographs the last on 2/7    PROCEDURE:   CT of the Chest was performed without intravenous contrast.    FINDINGS:    CHEST:    CHEST WALL AND LOWER NECK: Within normal limits  MEDIASTINUM AND YOLY: Mildly enlarged mediastinal lymph nodes up to 1.4   cm, nonspecific.  HEART: Within normal limits  VESSELS: Unopacified great vessels are unremarkable.  LUNG AND LARGE AIRWAYS: Diffuse bilateral airspace opacities. No   endobronchial mass. Small bilateral pleural effusions. No    UPPER ABDOMEN: Left renal cyst, subcutaneous soft tissue edema.    MUSCULOSKELETAL: Within normal limits    IMPRESSION:     Diffuse bilateral alveolar airspace opacities and small pleural effusion,   nonspecific. The differential includes ARDS, pneumonia including   atypical, if immunocompromise PCP pneumonia, pulmonary hemorrhage and   other inflammatory pneumonitis.      PAOLO PRICE M.D., ATTENDING RADIOLOGIST  This document has been electronically signed. Feb 8 2017  2:19PM

## 2017-02-12 NOTE — PROGRESS NOTE ADULT - PROBLEM SELECTOR PLAN 4
Bilateral pulmonary infiltrates with anemia and renal failure.   Rule out vasculitis vs TRALI  Renal and pulmonary on consult  ON IV steroids  IV rocephin and azithromycin day 4  ID on consulted continue with current antibiotics, will slowly wean down the oxygen, will send cultures of blood and sputum as it was not done initially, patient Rheumatological work up has been negative so for like Anti DS DNA -ve, HUGO is +ve, pulmonary is on board, will repeat CXR, will get rheumatology consult tomorrow.

## 2017-02-12 NOTE — PROGRESS NOTE ADULT - ASSESSMENT
The patient is a 56 year old female with history of diabetes mellitus type 2 on insulin who presented to the ER from the office of her PMD. Seen and examined with a  at the bedside. According to the patient for the past 2 months she has had complaints of vomiting atleast 2-3 times a day associated with generalized abdominal pain. Her symptoms have progressively worsened and she has had associated weakness. She uses lantus at home for her diabetes and says she is compliant. She went to her PMD  with complaints of weakness and dark stool. Her hemoglobin was noted to be 5gm/dl therefore she was sent to the ER. In the ER hb is 6.7gm/dl with positive melena and guaic positive. Also noted to be hyperglycemic with hyponatremia given 2 liters of normal saline with insulin.     Evaluated by Gi, initially recommended EGD when hb/hct stable. However patient was noted to have hypoxia with increased o2 requirement. CT of the chest was done which showed bilateral interstitial infiltrates. Echocardiogram was showed decrased systolic function ef of 45%. Evlauated by Dr Meyers cardiology, recommends no diuresis at this time given renal insufficinecy and no cardiac work up reocmmended at this time.  Lower extremity duplex negative. Discussed with Dr Velez, recommended iV steroids and antibiotic coverage with nebs. Worsening renal failure and hyponatremia. Suspicion for underlying vascluitis. Dr sarmiento, nephrology consulted. Vasculitis work showed negative ds DNA, Negative Anti GBM antibody, +ve HUGO,  renal ultrasound done showed Bilateral increased cortical echogenicity consistent with chronic medical renal disease, No hydronephrosis, mass or calculus.   Hb/hct was stable with no active bleeding started on heparin subcut for dvt prophylaxis, need to get rheumatology consult on Monday.      Lnatus and humalog increased to hyperglycemia secondary to steroids.

## 2017-02-12 NOTE — PROGRESS NOTE ADULT - PROBLEM SELECTOR PLAN 6
May be due to infectious cause vs steroids  Now normalized
May be due to infectious cause vs steroids  Continue to follow
May be due to infectious cause vs steroids  Continue to follow

## 2017-02-13 LAB
ANION GAP SERPL CALC-SCNC: 13 MMOL/L — SIGNIFICANT CHANGE UP (ref 5–17)
BUN SERPL-MCNC: 63 MG/DL — HIGH (ref 8–20)
CALCIUM SERPL-MCNC: 8.4 MG/DL — LOW (ref 8.6–10.2)
CHLORIDE SERPL-SCNC: 101 MMOL/L — SIGNIFICANT CHANGE UP (ref 98–107)
CO2 SERPL-SCNC: 19 MMOL/L — LOW (ref 22–29)
CREAT SERPL-MCNC: 1.32 MG/DL — HIGH (ref 0.5–1.3)
CRP SERPL-MCNC: 1 MG/DL — HIGH (ref 0–0.4)
CULTURE RESULTS: SIGNIFICANT CHANGE UP
ERYTHROCYTE [SEDIMENTATION RATE] IN BLOOD: 55 MM/HR — HIGH (ref 0–20)
GBM IGG SER-ACNC: <0.2 U — SIGNIFICANT CHANGE UP
GLUCOSE SERPL-MCNC: 148 MG/DL — HIGH (ref 70–115)
HCT VFR BLD CALC: 29 % — LOW (ref 37–47)
HGB BLD-MCNC: 9.7 G/DL — LOW (ref 12–16)
MCHC RBC-ENTMCNC: 26.6 PG — LOW (ref 27–31)
MCHC RBC-ENTMCNC: 33.4 G/DL — SIGNIFICANT CHANGE UP (ref 32–36)
MCV RBC AUTO: 79.5 FL — LOW (ref 81–99)
PLATELET # BLD AUTO: 549 K/UL — HIGH (ref 150–400)
POTASSIUM SERPL-MCNC: 3.8 MMOL/L — SIGNIFICANT CHANGE UP (ref 3.5–5.3)
POTASSIUM SERPL-SCNC: 3.8 MMOL/L — SIGNIFICANT CHANGE UP (ref 3.5–5.3)
RBC # BLD: 3.65 M/UL — LOW (ref 4.4–5.2)
RBC # FLD: 14.3 % — SIGNIFICANT CHANGE UP (ref 11–15.6)
SODIUM SERPL-SCNC: 133 MMOL/L — LOW (ref 135–145)
SPECIMEN SOURCE: SIGNIFICANT CHANGE UP
WBC # BLD: 10.44 K/UL — SIGNIFICANT CHANGE UP (ref 4.8–10.8)
WBC # FLD AUTO: 10.44 K/UL — SIGNIFICANT CHANGE UP (ref 4.8–10.8)

## 2017-02-13 PROCEDURE — 71020: CPT | Mod: 26

## 2017-02-13 PROCEDURE — 99233 SBSQ HOSP IP/OBS HIGH 50: CPT

## 2017-02-13 PROCEDURE — 99232 SBSQ HOSP IP/OBS MODERATE 35: CPT

## 2017-02-13 RX ADMIN — PANTOPRAZOLE SODIUM 40 MILLIGRAM(S): 20 TABLET, DELAYED RELEASE ORAL at 12:15

## 2017-02-13 RX ADMIN — INSULIN GLARGINE 20 UNIT(S): 100 INJECTION, SOLUTION SUBCUTANEOUS at 21:37

## 2017-02-13 RX ADMIN — Medication 4: at 12:16

## 2017-02-13 RX ADMIN — HEPARIN SODIUM 5000 UNIT(S): 5000 INJECTION INTRAVENOUS; SUBCUTANEOUS at 05:37

## 2017-02-13 RX ADMIN — Medication 60 MILLIGRAM(S): at 21:37

## 2017-02-13 RX ADMIN — Medication 4: at 09:30

## 2017-02-13 RX ADMIN — Medication 15 UNIT(S): at 09:30

## 2017-02-13 RX ADMIN — Medication 15 UNIT(S): at 12:19

## 2017-02-13 RX ADMIN — Medication 25 MILLIGRAM(S): at 21:44

## 2017-02-13 RX ADMIN — SODIUM CHLORIDE 1 GRAM(S): 9 INJECTION INTRAMUSCULAR; INTRAVENOUS; SUBCUTANEOUS at 05:37

## 2017-02-13 RX ADMIN — HEPARIN SODIUM 5000 UNIT(S): 5000 INJECTION INTRAVENOUS; SUBCUTANEOUS at 17:31

## 2017-02-13 RX ADMIN — SODIUM CHLORIDE 1 GRAM(S): 9 INJECTION INTRAMUSCULAR; INTRAVENOUS; SUBCUTANEOUS at 14:13

## 2017-02-13 RX ADMIN — Medication 60 MILLIGRAM(S): at 05:37

## 2017-02-13 RX ADMIN — Medication 60 MILLIGRAM(S): at 14:12

## 2017-02-13 RX ADMIN — SODIUM CHLORIDE 80 MILLILITER(S): 9 INJECTION INTRAMUSCULAR; INTRAVENOUS; SUBCUTANEOUS at 12:16

## 2017-02-13 NOTE — PROGRESS NOTE ADULT - SUBJECTIVE AND OBJECTIVE BOX
NEPHROLOGY INTERVAL HPI/OVERNIGHT EVENTS:  no acute distress  no cp, n/v/d  Dyspnea improved    MEDICATIONS  (STANDING):  azithromycin  IVPB  IV Intermittent   cefTRIAXone   IVPB  IV Intermittent   azithromycin  IVPB 500milliGRAM(s) IV Intermittent every 24 hours  cefTRIAXone   IVPB 1Gram(s) IV Intermittent every 24 hours  methylPREDNISolone sodium succinate Injectable 60milliGRAM(s) IV Push every 6 hours  heparin  Injectable 5000Unit(s) SubCutaneous every 12 hours  insulin glargine Injectable (LANTUS) 20Unit(s) SubCutaneous at bedtime  insulin lispro Injectable (HumaLOG) 15Unit(s) SubCutaneous three times a day before meals  dextrose 5%. 1000milliLiter(s) IV Continuous <Continuous>  dextrose 50% Injectable 12.5Gram(s) IV Push once  dextrose 50% Injectable 25Gram(s) IV Push once  dextrose 50% Injectable 25Gram(s) IV Push once  pantoprazole  Injectable 40milliGRAM(s) IV Push daily  sodium chloride 0.9%. 1000milliLiter(s) IV Continuous <Continuous>  sodium chloride 1Gram(s) Oral three times a day  insulin lispro (HumaLOG) corrective regimen sliding scale  SubCutaneous three times a day before meals  dextrose 5%. 1000milliLiter(s) IV Continuous <Continuous>  dextrose 50% Injectable 12.5Gram(s) IV Push once  dextrose 50% Injectable 25Gram(s) IV Push once  dextrose 50% Injectable 25Gram(s) IV Push once    MEDICATIONS  (PRN):  ondansetron Injectable 4milliGRAM(s) IV Push every 6 hours PRN Nausea  hydrALAZINE 25milliGRAM(s) Oral every 6 hours PRN Systolic blood pressure >160  ALBUTerol    0.083% 2.5milliGRAM(s) Nebulizer every 6 hours PRN wheeze  dextrose Gel 1Dose(s) Oral once PRN Blood Glucose LESS THAN 70 milliGRAM(s)/deciliter  glucagon  Injectable 1milliGRAM(s) IntraMuscular once PRN Glucose LESS THAN 70 milligrams/deciliter  dextrose Gel 1Dose(s) Oral once PRN Blood Glucose LESS THAN 70 milliGRAM(s)/deciliter  glucagon  Injectable 1milliGRAM(s) IntraMuscular once PRN Glucose LESS THAN 70 milligrams/deciliter      Allergies    No Known Allergies    Intolerances                Vital Signs Last 24 Hrs  T(C): 36.2, Max: 36.4 (02-12 @ 15:00)  T(F): 97.1, Max: 97.6 (02-12 @ 15:00)  HR: 56 (56 - 71)  BP: 144/70 (104/78 - 144/70)  BP(mean): --  RR: 20 (16 - 20)  SpO2: 97% (95% - 98%)    PHYSICAL EXAM:  GENERAL: weakness  HEAD:  Atraumatic, Normocephalic  EYES: EOMI, PERRLA, conjunctiva and sclera clear  NECK: Supple, No JVD  NERVOUS SYSTEM:  Alert & Oriented X3,  intact and symmetric  CHEST/LUNG: Diminished BS bilaterally   HEART: Regular rate and rhythm; No murmurs, rubs, or gallops  ABDOMEN: Soft, +BS  EXTREMITIES:  2+ Peripheral Pulses, No clubbing, cyanosis, or edema  SKIN: No rashes or lesions      LABS:                        9.7    10.44 )-----------( 549      ( 13 Feb 2017 05:28 )             29.0     13 Feb 2017 05:28    133    |  101    |  63.0   ----------------------------<  148    3.8     |  19.0   |  1.32     Ca    8.4        13 Feb 2017 05:28    TPro  6.0    /  Alb  2.3    /  TBili  0.6    /  DBili  x      /  AST  22     /  ALT  15     /  AlkPhos  86     12 Feb 2017 06:21            RADIOLOGY & ADDITIONAL TESTS:   EXAM:  US KIDNEY(S)                          PROCEDURE DATE:  02/09/2017        INTERPRETATION:  CLINICAL HISTORY: Renal insufficiency.    TECHNIQUE: Renal Sonography.     COMPARISON: None    FINDINGS: The right kidney displays increased cortical echogenicity   consistent with chronic medical renal disease. The right kidney measures   9.4 cm in longitudinal dimension. No right hydronephrosis, mass or   calculi is visualized. Upper pole cyst measures 1.5 cm.    The left kidney displays increased cortical echogenicity consistent with   chronic medical renal disease. The left kidney measures 10.9 cm in   longitudinal dimension. No right hydronephrosis, mass or calculi is   visualized. Midpole cyst measures 1.5 cm.    The bladder is measures 8.8 x 7 x 7 x 4.2 cm. Left ureteral jet   visualized. Right ureteral jet not seen. No gross intravesical mass.      IMPRESSION:  Bilateral increased cortical echogenicity consistent with chronic medical   renal disease.  No hydronephrosis, mass or calculus.

## 2017-02-13 NOTE — PROGRESS NOTE ADULT - ASSESSMENT
-Abnormal CXR. Pul edema, ILD, AIP, TRALI, Pna? Clinicallly improving.  -MARSHALL  -Hypoxia   -+HUGO    RECC:  Repeat CXR today. Steroid taper. Oxygen. Renal f/u. Oxygen. Abx. -Abnormal CXR. Pul edema, ILD, AIP, TRALI, Pna? Clinicallly improving.  -CHF. Decreased EF seen on echo.  -MARSHALL  -Hypoxia   -+HUGO    RECC:  Repeat CXR today. Steroid taper. Oxygen. Renal f/u. Oxygen. Abx.

## 2017-02-13 NOTE — PROGRESS NOTE ADULT - SUBJECTIVE AND OBJECTIVE BOX
CHIEF COMPLAINT/INTERVAL HISTORY:    Patient is a 56y old  Female who presents with a chief complaint of dark stools (07 Feb 2017 11:01)      HPI:  The patient is a 56 year old female with history of diabetes mellitus type 2 on insulin who presented to the ER from the office of her PMD. Seen and examined with a  at the bedside. According to the patient for the past 2 months she has had complaints of vomiting atleast 2-3 times a day associated with generalized abdominal pain. Her symptoms have progressively worsened and she has had associated weakness. She uses lantus at home for her diabetes and says she is compliant. She went to her PMD today with complaints of weakness and dark stool. Her hemoglobin was noted to be 5gm/dl therefore she was sent to the ER. In the ER hb is 6.7gm/dl with positive melena and guaic positive. Also noted to be hyperglycemic with hyponatremia given 2 liters of normal saline with insulin. (06 Feb 2017 19:02)      SUBJECTIVE & OBJECTIVE: Pt seen and examined at bedside. Denies any overnight issues. Translated by EARNEST Magana    ICU Vital Signs Last 24 Hrs  T(C): 36.3, Max: 36.4 (02-12 @ 21:42)  T(F): 97.3, Max: 97.6 (02-12 @ 21:42)  HR: 68 (56 - 71)  BP: 147/80 (104/78 - 147/80)  BP(mean): --  ABP: --  ABP(mean): --  RR: 20 (16 - 20)  SpO2: 97% (95% - 97%)        MEDICATIONS  (STANDING):  heparin  Injectable 5000Unit(s) SubCutaneous every 12 hours  insulin glargine Injectable (LANTUS) 20Unit(s) SubCutaneous at bedtime  insulin lispro Injectable (HumaLOG) 15Unit(s) SubCutaneous three times a day before meals  dextrose 5%. 1000milliLiter(s) IV Continuous <Continuous>  dextrose 50% Injectable 12.5Gram(s) IV Push once  dextrose 50% Injectable 25Gram(s) IV Push once  dextrose 50% Injectable 25Gram(s) IV Push once  pantoprazole  Injectable 40milliGRAM(s) IV Push daily  insulin lispro (HumaLOG) corrective regimen sliding scale  SubCutaneous three times a day before meals  dextrose 5%. 1000milliLiter(s) IV Continuous <Continuous>  dextrose 50% Injectable 12.5Gram(s) IV Push once  dextrose 50% Injectable 25Gram(s) IV Push once  dextrose 50% Injectable 25Gram(s) IV Push once  methylPREDNISolone sodium succinate Injectable 60milliGRAM(s) IV Push every 8 hours    MEDICATIONS  (PRN):  ondansetron Injectable 4milliGRAM(s) IV Push every 6 hours PRN Nausea  hydrALAZINE 25milliGRAM(s) Oral every 6 hours PRN Systolic blood pressure >160  ALBUTerol    0.083% 2.5milliGRAM(s) Nebulizer every 6 hours PRN wheeze  dextrose Gel 1Dose(s) Oral once PRN Blood Glucose LESS THAN 70 milliGRAM(s)/deciliter  glucagon  Injectable 1milliGRAM(s) IntraMuscular once PRN Glucose LESS THAN 70 milligrams/deciliter  dextrose Gel 1Dose(s) Oral once PRN Blood Glucose LESS THAN 70 milliGRAM(s)/deciliter  glucagon  Injectable 1milliGRAM(s) IntraMuscular once PRN Glucose LESS THAN 70 milligrams/deciliter      LABS:                        9.7    10.44 )-----------( 549      ( 13 Feb 2017 05:28 )             29.0     13 Feb 2017 05:28    133    |  101    |  63.0   ----------------------------<  148    3.8     |  19.0   |  1.32     Ca    8.4        13 Feb 2017 05:28    TPro  6.0    /  Alb  2.3    /  TBili  0.6    /  DBili  x      /  AST  22     /  ALT  15     /  AlkPhos  86     12 Feb 2017 06:21          CAPILLARY BLOOD GLUCOSE  234 (13 Feb 2017 12:00)  209 (13 Feb 2017 08:08)  209 (12 Feb 2017 21:42)  226 (12 Feb 2017 16:48)      RECENT CULTURES:      RADIOLOGY & ADDITIONAL TESTS:      PHYSICAL EXAM:    GENERAL: NAD, well-groomed, well-developed  HEAD:  Atraumatic, Normocephalic  EYES: EOMI, PERRLA, conjunctiva and sclera clear  ENMT: Moist mucous membranes  NECK: Supple, No JVD  NERVOUS SYSTEM:  Alert & Oriented X3, Motor Strength 5/5 B/L upper and lower extremities; DTRs 2+ intact and symmetric  CHEST/LUNG: b/l rhonchi  HEART: Regular rate and rhythm; No murmurs, rubs, or gallops  ABDOMEN: Soft, Nontender, Nondistended; Bowel sounds present  EXTREMITIES:  2+ Peripheral Pulses, b/l 1+ edema

## 2017-02-13 NOTE — PROGRESS NOTE ADULT - SUBJECTIVE AND OBJECTIVE BOX
[facility]  INFECTIOUS DISEASES AND INTERNAL MEDICINE OF [place]  =======================================================  Jose Carlos Nuñez MD  Diplomates American Board of Internal Medicine and Infectious Diseases  =======================================================    MORALES, JAROCHO     No fevers    Allergies:  No Known Allergies    REVIEW OF SYSTEMS:  CONSTITUTIONAL: No fevers or chills  HEENT:  No diplopia or blurred vision.  No earache, sore throat or runny nose.  CARDIOVASCULAR:  No pressure, squeezing, strangling, tightness, heaviness or aching about the chest, neck, axilla or epigastrium.  RESPIRATORY:  No cough, shortness of breath, PND or orthopnea.  GASTROINTESTINAL:  No nausea, vomiting or diarrhea.  GENITOURINARY:  No dysuria, frequency or urgency  MUSCULOSKELETAL:  no joint aches, no muscle pain  SKIN:  No change in skin, hair or nails.  NEUROLOGIC:  No paresthesias, fasciculations, seizures or weakness.  PSYCHIATRIC:  No disorder of thought or mood.  ENDOCRINE:  No heat or cold intolerance, polyuria or polydipsia.  HEMATOLOGICAL:  No easy bruising or bleeding.     Physical Exam:  Vital Signs Last 24 Hrs  T(C): 36.2, Max: 36.4 (02-12 @ 15:00)  T(F): 97.1, Max: 97.6 (02-12 @ 15:00)  HR: 56 (56 - 71)  BP: 144/70 (104/78 - 144/70)  RR: 20 (16 - 20)  SpO2: 97% (95% - 97%)    GEN: NAD, pleasant  HEENT: normocephalic and atraumatic. EOMI. PERRL.    NECK: Supple.  LUNGS: Clear to auscultation.  HEART: Regular rate and rhythm  ABDOMEN: Soft, nontender, and nondistended.  Positive bowel sounds.    : No CVA tenderness  EXTREMITIES: Without any cyanosis, clubbing, rash, lesions or edema.  NEUROLOGIC: Cranial nerves II through XII are grossly intact.  PSYCHIATRIC: Appropriate affect .  SKIN: No ulceration or induration present.      Labs:  13 Feb 2017 05:28    133    |  101    |  63.0   ----------------------------<  148    3.8     |  19.0   |  1.32     Ca    8.4        13 Feb 2017 05:28    TPro  6.0    /  Alb  2.3    /  TBili  0.6    /  DBili  x      /  AST  22     /  ALT  15     /  AlkPhos  86     12 Feb 2017 06:21                          9.7    10.44 )-----------( 549      ( 13 Feb 2017 05:28 )             29.0     LIVER FUNCTIONS - ( 12 Feb 2017 06:21 )  Alb: 2.3 g/dL / Pro: 6.0 g/dL / ALK PHOS: 86 U/L / ALT: 15 U/L / AST: 22 U/L / GGT: x           RECENT CULTURES:  02-11 .Sputum Sputum XXXX   Moderate White blood cells  Moderate Yeast  Few Gram positive cocci in pairs   Numerous Candida species  Numerous Routine respiratory puma present    02-10 .Blood Blood XXXX XXXX   No growth at 48 hours

## 2017-02-13 NOTE — PROGRESS NOTE ADULT - PROBLEM SELECTOR PLAN 4
Bilateral pulmonary infiltrates with anemia and renal failure.   Rule out vasculitis vs TRALI  Renal and pulmonary on consult  ON IV steroids for possible ILD per pulm  Completed Abx course per ID   Anti DS DNA -ve, anti GBM antibody negative, HUGO is +ve with high titers, ESR/CRP elevated, will repeat CXR, will get rheumatology consult

## 2017-02-13 NOTE — PROGRESS NOTE ADULT - PROBLEM SELECTOR PLAN 1
AOCD   Stable status post transfusion 2 units prbc on 02/06/2017  No evidence of acute bleeding. EGD held until stable, Hb has been stable, will continue monitor.

## 2017-02-13 NOTE — PROGRESS NOTE ADULT - ASSESSMENT
1) ARF: r/o chronic component ==> suggested by echogenic kidneys on sono              Elevated BUN also due to steroids              ? vasculitis ==> +HUGO 1:640 (negative ds DNA)                                       anti GBM negative               -  remaining serologies pending               - monitor renal function               - consider Rheum evaluation for elevated HUGO at high titer    2) Hyponatremia: improved                - optimal glucose control                - avoid excessive hypotonic fluids

## 2017-02-13 NOTE — PROGRESS NOTE ADULT - SUBJECTIVE AND OBJECTIVE BOX
PULMONARY PROGRESS NOTE      FRANNIE MORALESEARNEST-[id number]    Patient is a 56y old  Female who presents with a chief complaint of dark stools (07 Feb 2017 11:01)      INTERVAL HPI/OVERNIGHT EVENTS: She said she is feeling better today. Less sob.     MEDICATIONS  (STANDING):  azithromycin  IVPB  IV Intermittent   cefTRIAXone   IVPB  IV Intermittent   azithromycin  IVPB 500milliGRAM(s) IV Intermittent every 24 hours  cefTRIAXone   IVPB 1Gram(s) IV Intermittent every 24 hours  methylPREDNISolone sodium succinate Injectable 60milliGRAM(s) IV Push every 6 hours  heparin  Injectable 5000Unit(s) SubCutaneous every 12 hours  insulin glargine Injectable (LANTUS) 20Unit(s) SubCutaneous at bedtime  insulin lispro Injectable (HumaLOG) 15Unit(s) SubCutaneous three times a day before meals  dextrose 5%. 1000milliLiter(s) IV Continuous <Continuous>  dextrose 50% Injectable 12.5Gram(s) IV Push once  dextrose 50% Injectable 25Gram(s) IV Push once  dextrose 50% Injectable 25Gram(s) IV Push once  pantoprazole  Injectable 40milliGRAM(s) IV Push daily  sodium chloride 0.9%. 1000milliLiter(s) IV Continuous <Continuous>  sodium chloride 1Gram(s) Oral three times a day  insulin lispro (HumaLOG) corrective regimen sliding scale  SubCutaneous three times a day before meals  dextrose 5%. 1000milliLiter(s) IV Continuous <Continuous>  dextrose 50% Injectable 12.5Gram(s) IV Push once  dextrose 50% Injectable 25Gram(s) IV Push once  dextrose 50% Injectable 25Gram(s) IV Push once      MEDICATIONS  (PRN):  ondansetron Injectable 4milliGRAM(s) IV Push every 6 hours PRN Nausea  hydrALAZINE 25milliGRAM(s) Oral every 6 hours PRN Systolic blood pressure >160  ALBUTerol    0.083% 2.5milliGRAM(s) Nebulizer every 6 hours PRN wheeze  dextrose Gel 1Dose(s) Oral once PRN Blood Glucose LESS THAN 70 milliGRAM(s)/deciliter  glucagon  Injectable 1milliGRAM(s) IntraMuscular once PRN Glucose LESS THAN 70 milligrams/deciliter  dextrose Gel 1Dose(s) Oral once PRN Blood Glucose LESS THAN 70 milliGRAM(s)/deciliter  glucagon  Injectable 1milliGRAM(s) IntraMuscular once PRN Glucose LESS THAN 70 milligrams/deciliter      Allergies    No Known Allergies    Intolerances        PAST MEDICAL & SURGICAL HISTORY:  DM (diabetes mellitus)  No significant past surgical history              Vital Signs Last 24 Hrs  T(C): 36.2, Max: 36.4 (02-12 @ 15:00)  T(F): 97.1, Max: 97.6 (02-12 @ 15:00)  HR: 56 (56 - 71)  BP: 144/70 (104/78 - 144/70)  BP(mean): --  RR: 20 (16 - 20)  SpO2: 97% (95% - 98%)    PHYSICAL EXAMINATION:    GENERAL: The patient is awake and alert in no apparent distress.     HEENT: Head is normocephalic and atraumatic. Extraocular muscles are intact. Mucous membranes are moist.    NECK: Supple.    LUNGS: Clear to auscultation without wheezing, rales or rhonchi; respirations unlabored    HEART: Regular rate and rhythm without murmur.    ABDOMEN: Soft, nontender, and nondistended.      EXTREMITIES: Without any cyanosis, clubbing, rash, lesions or edema.    NEUROLOGIC: Grossly intact.    LABS:

## 2017-02-13 NOTE — PROGRESS NOTE ADULT - PROBLEM SELECTOR PLAN 1
Patient completed course of Ceftriaxone and Azithromycin  Blood cultures 2/10/17 - no growth  strep Ag negative  Patient with no fever and no leukocytosis  Will D/C antibiotics  Please follow up Legionella Ag

## 2017-02-14 LAB
ANION GAP SERPL CALC-SCNC: 13 MMOL/L — SIGNIFICANT CHANGE UP (ref 5–17)
ANISOCYTOSIS BLD QL: SLIGHT — SIGNIFICANT CHANGE UP
AUTO DIFF PNL BLD: NEGATIVE — SIGNIFICANT CHANGE UP
AUTO DIFF PNL BLD: NEGATIVE — SIGNIFICANT CHANGE UP
BUN SERPL-MCNC: 60 MG/DL — HIGH (ref 8–20)
C-ANCA SER-ACNC: NEGATIVE — SIGNIFICANT CHANGE UP
C-ANCA SER-ACNC: NEGATIVE — SIGNIFICANT CHANGE UP
CALCIUM SERPL-MCNC: 8.5 MG/DL — LOW (ref 8.6–10.2)
CHLORIDE SERPL-SCNC: 101 MMOL/L — SIGNIFICANT CHANGE UP (ref 98–107)
CO2 SERPL-SCNC: 21 MMOL/L — LOW (ref 22–29)
CREAT SERPL-MCNC: 1.4 MG/DL — HIGH (ref 0.5–1.3)
GLUCOSE SERPL-MCNC: 100 MG/DL — SIGNIFICANT CHANGE UP (ref 70–115)
HCT VFR BLD CALC: 28.2 % — LOW (ref 37–47)
HGB BLD-MCNC: 9.5 G/DL — LOW (ref 12–16)
HYPOCHROMIA BLD QL: SLIGHT — SIGNIFICANT CHANGE UP
LEGIONELLA AB SER-ACNC: NEGATIVE — SIGNIFICANT CHANGE UP
LYMPHOCYTES # BLD AUTO: 9 % — LOW (ref 20–55)
MACROCYTES BLD QL: SLIGHT — SIGNIFICANT CHANGE UP
MAGNESIUM SERPL-MCNC: 1.8 MG/DL — SIGNIFICANT CHANGE UP (ref 1.8–2.5)
MCHC RBC-ENTMCNC: 27.1 PG — SIGNIFICANT CHANGE UP (ref 27–31)
MCHC RBC-ENTMCNC: 33.7 G/DL — SIGNIFICANT CHANGE UP (ref 32–36)
MCV RBC AUTO: 80.3 FL — LOW (ref 81–99)
MICROCYTES BLD QL: SLIGHT — SIGNIFICANT CHANGE UP
MONOCYTES NFR BLD AUTO: 5 % — SIGNIFICANT CHANGE UP (ref 3–10)
NEUTROPHILS NFR BLD AUTO: 86 % — HIGH (ref 37–73)
O2 CT VFR BLD CALC: ABNORMAL
O2 CT VFR BLD CALC: ABNORMAL
P-ANCA SER-ACNC: POSITIVE
P-ANCA SER-ACNC: POSITIVE
PHOSPHATE SERPL-MCNC: 4.1 MG/DL — SIGNIFICANT CHANGE UP (ref 2.4–4.7)
PLAT MORPH BLD: NORMAL — SIGNIFICANT CHANGE UP
PLATELET # BLD AUTO: 486 K/UL — HIGH (ref 150–400)
POIKILOCYTOSIS BLD QL AUTO: SLIGHT — SIGNIFICANT CHANGE UP
POTASSIUM SERPL-MCNC: 4.3 MMOL/L — SIGNIFICANT CHANGE UP (ref 3.5–5.3)
POTASSIUM SERPL-SCNC: 4.3 MMOL/L — SIGNIFICANT CHANGE UP (ref 3.5–5.3)
RBC # BLD: 3.51 M/UL — LOW (ref 4.4–5.2)
RBC # FLD: 14.5 % — SIGNIFICANT CHANGE UP (ref 11–15.6)
RBC BLD AUTO: ABNORMAL
SODIUM SERPL-SCNC: 135 MMOL/L — SIGNIFICANT CHANGE UP (ref 135–145)
WBC # BLD: 9.11 K/UL — SIGNIFICANT CHANGE UP (ref 4.8–10.8)
WBC # FLD AUTO: 9.11 K/UL — SIGNIFICANT CHANGE UP (ref 4.8–10.8)

## 2017-02-14 PROCEDURE — 99233 SBSQ HOSP IP/OBS HIGH 50: CPT

## 2017-02-14 RX ADMIN — PANTOPRAZOLE SODIUM 40 MILLIGRAM(S): 20 TABLET, DELAYED RELEASE ORAL at 13:04

## 2017-02-14 RX ADMIN — Medication 15 UNIT(S): at 18:45

## 2017-02-14 RX ADMIN — HEPARIN SODIUM 5000 UNIT(S): 5000 INJECTION INTRAVENOUS; SUBCUTANEOUS at 05:21

## 2017-02-14 RX ADMIN — Medication 15 UNIT(S): at 13:03

## 2017-02-14 RX ADMIN — Medication 60 MILLIGRAM(S): at 18:44

## 2017-02-14 RX ADMIN — Medication 60 MILLIGRAM(S): at 13:12

## 2017-02-14 RX ADMIN — Medication 15 UNIT(S): at 09:01

## 2017-02-14 RX ADMIN — INSULIN GLARGINE 20 UNIT(S): 100 INJECTION, SOLUTION SUBCUTANEOUS at 22:24

## 2017-02-14 RX ADMIN — HEPARIN SODIUM 5000 UNIT(S): 5000 INJECTION INTRAVENOUS; SUBCUTANEOUS at 18:44

## 2017-02-14 RX ADMIN — Medication 6: at 13:03

## 2017-02-14 RX ADMIN — Medication 60 MILLIGRAM(S): at 05:21

## 2017-02-14 NOTE — PROGRESS NOTE ADULT - SUBJECTIVE AND OBJECTIVE BOX
CHIEF COMPLAINT/INTERVAL HISTORY:    Patient is a 56y old  Female who presents with a chief complaint of dark stools (07 Feb 2017 11:01)      HPI:  The patient is a 56 year old female with history of diabetes mellitus type 2 on insulin who presented to the ER from the office of her PMD. Seen and examined with a  at the bedside. According to the patient for the past 2 months she has had complaints of vomiting atleast 2-3 times a day associated with generalized abdominal pain. Her symptoms have progressively worsened and she has had associated weakness. She uses lantus at home for her diabetes and says she is compliant. She went to her PMD today with complaints of weakness and dark stool. Her hemoglobin was noted to be 5gm/dl therefore she was sent to the ER. In the ER hb is 6.7gm/dl with positive melena and guaic positive. Also noted to be hyperglycemic with hyponatremia given 2 liters of normal saline with insulin. (06 Feb 2017 19:02)      SUBJECTIVE & OBJECTIVE: Pt seen and examined at bedside. Translation by RN. Pt denies any complaints. Saturating 97% on RA with 90% on ambulation as reported by the RN.     ICU Vital Signs Last 24 Hrs  T(C): 36.6, Max: 36.6 (02-13 @ 21:34)  T(F): 97.8, Max: 97.8 (02-13 @ 21:34)  HR: 64 (64 - 68)  BP: 164/85 (147/80 - 164/85)  BP(mean): --  ABP: --  ABP(mean): --  RR: 20 (20 - 20)  SpO2: 97% (97% - 97%)        MEDICATIONS  (STANDING):  heparin  Injectable 5000Unit(s) SubCutaneous every 12 hours  insulin glargine Injectable (LANTUS) 20Unit(s) SubCutaneous at bedtime  insulin lispro Injectable (HumaLOG) 15Unit(s) SubCutaneous three times a day before meals  dextrose 5%. 1000milliLiter(s) IV Continuous <Continuous>  dextrose 50% Injectable 12.5Gram(s) IV Push once  dextrose 50% Injectable 25Gram(s) IV Push once  dextrose 50% Injectable 25Gram(s) IV Push once  pantoprazole  Injectable 40milliGRAM(s) IV Push daily  insulin lispro (HumaLOG) corrective regimen sliding scale  SubCutaneous three times a day before meals  dextrose 5%. 1000milliLiter(s) IV Continuous <Continuous>  dextrose 50% Injectable 12.5Gram(s) IV Push once  dextrose 50% Injectable 25Gram(s) IV Push once  dextrose 50% Injectable 25Gram(s) IV Push once  methylPREDNISolone sodium succinate Injectable 60milliGRAM(s) IV Push every 8 hours    MEDICATIONS  (PRN):  ondansetron Injectable 4milliGRAM(s) IV Push every 6 hours PRN Nausea  hydrALAZINE 25milliGRAM(s) Oral every 6 hours PRN Systolic blood pressure >160  ALBUTerol    0.083% 2.5milliGRAM(s) Nebulizer every 6 hours PRN wheeze  dextrose Gel 1Dose(s) Oral once PRN Blood Glucose LESS THAN 70 milliGRAM(s)/deciliter  glucagon  Injectable 1milliGRAM(s) IntraMuscular once PRN Glucose LESS THAN 70 milligrams/deciliter  dextrose Gel 1Dose(s) Oral once PRN Blood Glucose LESS THAN 70 milliGRAM(s)/deciliter  glucagon  Injectable 1milliGRAM(s) IntraMuscular once PRN Glucose LESS THAN 70 milligrams/deciliter      LABS:                        9.5    9.11  )-----------( 486      ( 14 Feb 2017 05:08 )             28.2     14 Feb 2017 05:08    135    |  101    |  60.0   ----------------------------<  100    4.3     |  21.0   |  1.40     Ca    8.5        14 Feb 2017 05:08  Phos  4.1       14 Feb 2017 05:08  Mg     1.8       14 Feb 2017 05:08            CAPILLARY BLOOD GLUCOSE  286 (14 Feb 2017 12:04)  89 (14 Feb 2017 08:00)  184 (13 Feb 2017 21:34)  90 (13 Feb 2017 17:23)      RECENT CULTURES:      RADIOLOGY & ADDITIONAL TESTS:      PHYSICAL EXAM:    GENERAL: NAD, well-groomed, well-developed  HEAD:  Atraumatic, Normocephalic  EYES: EOMI, PERRLA, conjunctiva and sclera clear  ENMT: Moist mucous membranes  NECK: Supple, No JVD  NERVOUS SYSTEM:  Alert & Oriented X3, Motor Strength 5/5 B/L upper and lower extremities; DTRs 2+ intact and symmetric  CHEST/LUNG: b/l basilar rhonchi  HEART: Regular rate and rhythm; No murmurs, rubs, or gallops  ABDOMEN: Soft, Nontender, Nondistended; Bowel sounds present  EXTREMITIES:  2+ Peripheral Pulses, b/l 1+ edema

## 2017-02-14 NOTE — PROGRESS NOTE ADULT - PROBLEM SELECTOR PROBLEM 4
Hypoxia
Acute respiratory distress
Hypoxia
Hypoxia

## 2017-02-14 NOTE — PROGRESS NOTE ADULT - PROBLEM SELECTOR PLAN 2
lantus to 20 units at QHS, meal humalog 15 units TID   Uncontrolled DM, will continue monitoring FS, will give coverage insulin.  Will taper steroids

## 2017-02-14 NOTE — PROGRESS NOTE ADULT - PROBLEM SELECTOR PROBLEM 3
Lung infiltrate on CT
Hyponatremia
DM (diabetes mellitus)
Hyponatremia
Lung infiltrate on CT
Lung infiltrate on CT

## 2017-02-14 NOTE — PROGRESS NOTE ADULT - PROBLEM SELECTOR PLAN 4
Bilateral pulmonary infiltrates with anemia and renal failure.   Rule out vasculitis vs TRALI  Renal and pulmonary on consult  taper IV steroids per pulm, improving oxygenation  Completed Abx course per ID   Anti DS DNA -ve, anti GBM antibody negative, HUGO is +ve with high titers, ESR/CRP elevated, will repeat CXR, Await rheumatology consult Dr. Evans

## 2017-02-14 NOTE — PROGRESS NOTE ADULT - ASSESSMENT
-Abnormal CXR. Pul edema, ILD, AIP, TRALI, Pna? Clinicallly and radiographically improving.  -CHF. Decreased EF seen on echo.  -MARSHALL  -Hypoxia   -+HUOG    RECC:   Steroid taper; tomorrow change to Prednisone 60 mg daily and decrease by 10 mg per day. Oxygen if needed; check RA oxygenation upon d/c. Renal f/u. Complete Abx. F/U with us as outpt. Need to ensure CXR to resolution. Call prn while in the hospital.

## 2017-02-14 NOTE — PROGRESS NOTE ADULT - SUBJECTIVE AND OBJECTIVE BOX
NEPHROLOGY INTERVAL HPI/OVERNIGHT EVENTS:  pt clinically stable  no acute sob  tolerating diet well    MEDICATIONS  (STANDING):  heparin  Injectable 5000Unit(s) SubCutaneous every 12 hours  insulin glargine Injectable (LANTUS) 20Unit(s) SubCutaneous at bedtime  insulin lispro Injectable (HumaLOG) 15Unit(s) SubCutaneous three times a day before meals  dextrose 5%. 1000milliLiter(s) IV Continuous <Continuous>  dextrose 50% Injectable 12.5Gram(s) IV Push once  dextrose 50% Injectable 25Gram(s) IV Push once  dextrose 50% Injectable 25Gram(s) IV Push once  pantoprazole  Injectable 40milliGRAM(s) IV Push daily  insulin lispro (HumaLOG) corrective regimen sliding scale  SubCutaneous three times a day before meals  dextrose 5%. 1000milliLiter(s) IV Continuous <Continuous>  dextrose 50% Injectable 12.5Gram(s) IV Push once  dextrose 50% Injectable 25Gram(s) IV Push once  dextrose 50% Injectable 25Gram(s) IV Push once  methylPREDNISolone sodium succinate Injectable 60milliGRAM(s) IV Push every 8 hours    MEDICATIONS  (PRN):  ondansetron Injectable 4milliGRAM(s) IV Push every 6 hours PRN Nausea  hydrALAZINE 25milliGRAM(s) Oral every 6 hours PRN Systolic blood pressure >160  ALBUTerol    0.083% 2.5milliGRAM(s) Nebulizer every 6 hours PRN wheeze  dextrose Gel 1Dose(s) Oral once PRN Blood Glucose LESS THAN 70 milliGRAM(s)/deciliter  glucagon  Injectable 1milliGRAM(s) IntraMuscular once PRN Glucose LESS THAN 70 milligrams/deciliter  dextrose Gel 1Dose(s) Oral once PRN Blood Glucose LESS THAN 70 milliGRAM(s)/deciliter  glucagon  Injectable 1milliGRAM(s) IntraMuscular once PRN Glucose LESS THAN 70 milligrams/deciliter      Allergies    No Known Allergies        Vital Signs Last 24 Hrs  T(C): 36.6, Max: 36.6 (02-13 @ 21:34)  T(F): 97.8, Max: 97.8 (02-13 @ 21:34)  HR: 64 (64 - 68)  BP: 164/85 (147/80 - 164/85)  BP(mean): --  RR: 20 (20 - 20)  SpO2: 97% (97% - 97%)    PHYSICAL EXAM:  GENERAL: weakness  HEAD:  Atraumatic, Normocephalic  EYES: EOMI, PERRLA, conjunctiva and sclera clear  NECK: Supple, No JVD  NERVOUS SYSTEM:  Alert & Oriented X3,  intact and symmetric  CHEST/LUNG: Diminished BS bilaterally   HEART: Regular rate and rhythm; No murmurs, rubs, or gallops  ABDOMEN: Soft, +BS  EXTREMITIES:  2+ Peripheral Pulses, No clubbing, cyanosis, or edema  SKIN: No rashes or lesions      LABS:                        9.5    9.11  )-----------( 486      ( 14 Feb 2017 05:08 )             28.2     14 Feb 2017 05:08    135    |  101    |  60.0   ----------------------------<  100    4.3     |  21.0   |  1.40     Ca    8.5        14 Feb 2017 05:08  Phos  4.1       14 Feb 2017 05:08  Mg     1.8       14 Feb 2017 05:08          Magnesium, Serum: 1.8 mg/dL (02-14 @ 05:08)  Phosphorus Level, Serum: 4.1 mg/dL (02-14 @ 05:08)      RADIOLOGY & ADDITIONAL TESTS:   EXAM:  CHEST P.A. LATERAL                          PROCEDURE DATE:  02/13/2017        INTERPRETATION:  History: Dyspnea    Technique:  PA and lateral views    Comparisons:  Chest x-ray dated 2/7/2017    Findings: There is improvement in bilateral airspace disease and   infiltrates in both lungs. Clearing and improved aeration is present in   the mid and upper lung fields. No pleural effusions are observed.. The   pulmonary vasculature and aorta are normal for age. Heart size is   unremarkable.The thorax is normal for age.    Impression: Clearing pneumonia.

## 2017-02-14 NOTE — PROGRESS NOTE ADULT - PROBLEM SELECTOR PROBLEM 2
DM (diabetes mellitus)
Type 2 diabetes mellitus with hyperglycemia, without long-term current use of insulin
Abdominal pain of unknown cause
Acute respiratory distress
DM (diabetes mellitus)
DM (diabetes mellitus)
Type 2 diabetes mellitus with hyperglycemia, without long-term current use of insulin
Leukemoid reaction

## 2017-02-14 NOTE — PROGRESS NOTE ADULT - SUBJECTIVE AND OBJECTIVE BOX
PULMONARY PROGRESS NOTE      FRANNIE MORALESEARNEST-[id number]    Patient is a 56y old  Female who presents with a chief complaint of dark stools (07 Feb 2017 11:01)      INTERVAL HPI/OVERNIGHT EVENTS: Feeling better. On RA.     MEDICATIONS  (STANDING):  heparin  Injectable 5000Unit(s) SubCutaneous every 12 hours  insulin glargine Injectable (LANTUS) 20Unit(s) SubCutaneous at bedtime  insulin lispro Injectable (HumaLOG) 15Unit(s) SubCutaneous three times a day before meals  dextrose 5%. 1000milliLiter(s) IV Continuous <Continuous>  dextrose 50% Injectable 12.5Gram(s) IV Push once  dextrose 50% Injectable 25Gram(s) IV Push once  dextrose 50% Injectable 25Gram(s) IV Push once  pantoprazole  Injectable 40milliGRAM(s) IV Push daily  insulin lispro (HumaLOG) corrective regimen sliding scale  SubCutaneous three times a day before meals  dextrose 5%. 1000milliLiter(s) IV Continuous <Continuous>  dextrose 50% Injectable 12.5Gram(s) IV Push once  dextrose 50% Injectable 25Gram(s) IV Push once  dextrose 50% Injectable 25Gram(s) IV Push once  methylPREDNISolone sodium succinate Injectable 60milliGRAM(s) IV Push every 12 hours      MEDICATIONS  (PRN):  ondansetron Injectable 4milliGRAM(s) IV Push every 6 hours PRN Nausea  hydrALAZINE 25milliGRAM(s) Oral every 6 hours PRN Systolic blood pressure >160  ALBUTerol    0.083% 2.5milliGRAM(s) Nebulizer every 6 hours PRN wheeze  dextrose Gel 1Dose(s) Oral once PRN Blood Glucose LESS THAN 70 milliGRAM(s)/deciliter  glucagon  Injectable 1milliGRAM(s) IntraMuscular once PRN Glucose LESS THAN 70 milligrams/deciliter  dextrose Gel 1Dose(s) Oral once PRN Blood Glucose LESS THAN 70 milliGRAM(s)/deciliter  glucagon  Injectable 1milliGRAM(s) IntraMuscular once PRN Glucose LESS THAN 70 milligrams/deciliter      Allergies    No Known Allergies    Intolerances        PAST MEDICAL & SURGICAL HISTORY:  DM (diabetes mellitus)  No significant past surgical history           Vital Signs Last 24 Hrs  T(C): 36.6, Max: 36.6 (02-13 @ 21:34)  T(F): 97.8, Max: 97.8 (02-13 @ 21:34)  HR: 64 (64 - 68)  BP: 164/85 (147/80 - 164/85)  BP(mean): --  RR: 20 (20 - 20)  SpO2: 97% (97% - 97%)    PHYSICAL EXAMINATION:    GENERAL: The patient is awake and alert in no apparent distress.     HEENT: Head is normocephalic and atraumatic. Extraocular muscles are intact. Mucous membranes are moist.    NECK: Supple.    LUNGS: Clear to auscultation without wheezing; scattered rales,     HEART: Regular rate and rhythm without murmur.    ABDOMEN: Soft, nontender, and nondistended.      EXTREMITIES: Without any cyanosis, clubbing, rash, lesions or edema.    NEUROLOGIC: Grossly intact.    LABS:                        9.5    9.11  )-----------( 486      ( 14 Feb 2017 05:08 )             28.2     14 Feb 2017 05:08    135    |  101    |  60.0   ----------------------------<  100    4.3     |  21.0   |  1.40     Ca    8.5        14 Feb 2017 05:08  Phos  4.1       14 Feb 2017 05:08  Mg     1.8       14 Feb 2017 05:08           RADIOLOGY & ADDITIONAL STUDIES:     CHEST P.A. LATERAL                          PROCEDURE DATE:  02/13/2017        INTERPRETATION:  History: Dyspnea    Technique:  PA and lateral views    Comparisons:  Chest x-ray dated 2/7/2017    Findings: There is improvement in bilateral airspace disease and   infiltrates in both lungs. Clearing and improved aeration is present in   the mid and upper lung fields. No pleural effusions are observed.. The   pulmonary vasculature and aorta are normal for age. Heart size is   unremarkable.The thorax is normal for age.    Impression: Clearing pneumonia.                NELIA COLLINS M.D., ATTENDING RADIOLOGIST

## 2017-02-14 NOTE — PROGRESS NOTE ADULT - PROBLEM SELECTOR PROBLEM 1
MARSHALL (acute kidney injury)
Anemia, unspecified type
Anemia
Anemia, unspecified type
Bilious vomiting with nausea
MARSHALL (acute kidney injury)
MARSHALL (acute kidney injury)
Lung infiltrate on CT

## 2017-02-14 NOTE — PROGRESS NOTE ADULT - PROBLEM SELECTOR PROBLEM 5
Shortness of breath at rest
MARSHALL (acute kidney injury)
Shortness of breath at rest
Shortness of breath at rest

## 2017-02-14 NOTE — PROGRESS NOTE ADULT - ASSESSMENT
1) ARF: r/o chronic component ==> suggested by echogenic kidneys on sono              Elevated BUN also due to steroids              ? vasculitis ==> +HUGO 1:640 (negative ds DNA)                                       anti GBM negative               -  remaining serologies still pending               - monitor renal function               - consider Rheum evaluation for elevated HUGO at high titer    2) Hyponatremia: improved                - optimal glucose control                - no excessive hypotonic fluids                - avoid excessive hypotonic fluids

## 2017-02-15 VITALS
SYSTOLIC BLOOD PRESSURE: 156 MMHG | DIASTOLIC BLOOD PRESSURE: 78 MMHG | OXYGEN SATURATION: 97 % | RESPIRATION RATE: 18 BRPM | TEMPERATURE: 98 F | HEART RATE: 62 BPM

## 2017-02-15 DIAGNOSIS — K92.2 GASTROINTESTINAL HEMORRHAGE, UNSPECIFIED: ICD-10-CM

## 2017-02-15 DIAGNOSIS — I77.6 ARTERITIS, UNSPECIFIED: ICD-10-CM

## 2017-02-15 DIAGNOSIS — M32.8 OTHER FORMS OF SYSTEMIC LUPUS ERYTHEMATOSUS: ICD-10-CM

## 2017-02-15 DIAGNOSIS — R76.8 OTHER SPECIFIED ABNORMAL IMMUNOLOGICAL FINDINGS IN SERUM: ICD-10-CM

## 2017-02-15 LAB
ANION GAP SERPL CALC-SCNC: 14 MMOL/L — SIGNIFICANT CHANGE UP (ref 5–17)
BUN SERPL-MCNC: 64 MG/DL — HIGH (ref 8–20)
C3 SERPL-MCNC: 90 MG/DL — SIGNIFICANT CHANGE UP (ref 80–180)
C4 SERPL-MCNC: 31 MG/DL — SIGNIFICANT CHANGE UP (ref 10–45)
CALCIUM SERPL-MCNC: 8.6 MG/DL — SIGNIFICANT CHANGE UP (ref 8.6–10.2)
CHLORIDE SERPL-SCNC: 97 MMOL/L — LOW (ref 98–107)
CK SERPL-CCNC: 80 U/L — SIGNIFICANT CHANGE UP (ref 25–170)
CO2 SERPL-SCNC: 20 MMOL/L — LOW (ref 22–29)
CREAT SERPL-MCNC: 1.31 MG/DL — HIGH (ref 0.5–1.3)
CULTURE RESULTS: SIGNIFICANT CHANGE UP
CULTURE RESULTS: SIGNIFICANT CHANGE UP
GLUCOSE SERPL-MCNC: 158 MG/DL — HIGH (ref 70–115)
HCT VFR BLD CALC: 29.9 % — LOW (ref 37–47)
HGB BLD-MCNC: 10 G/DL — LOW (ref 12–16)
LYMPHOCYTES # BLD AUTO: 1 K/UL — SIGNIFICANT CHANGE UP (ref 1–4.8)
LYMPHOCYTES # BLD AUTO: 9.9 % — LOW (ref 20–55)
MAGNESIUM SERPL-MCNC: 1.8 MG/DL — SIGNIFICANT CHANGE UP (ref 1.8–2.5)
MCHC RBC-ENTMCNC: 27.2 PG — SIGNIFICANT CHANGE UP (ref 27–31)
MCHC RBC-ENTMCNC: 33.4 G/DL — SIGNIFICANT CHANGE UP (ref 32–36)
MCV RBC AUTO: 81.5 FL — SIGNIFICANT CHANGE UP (ref 81–99)
MONOCYTES # BLD AUTO: 0.6 K/UL — SIGNIFICANT CHANGE UP (ref 0–0.8)
MONOCYTES NFR BLD AUTO: 6.1 % — SIGNIFICANT CHANGE UP (ref 3–10)
MYELOPEROXIDASE AB SER-ACNC: 70.4 UNITS — HIGH
MYELOPEROXIDASE CELLS FLD QL: POSITIVE
NEUTROPHILS # BLD AUTO: 8.1 K/UL — HIGH (ref 1.8–8)
NEUTROPHILS NFR BLD AUTO: 83.5 % — HIGH (ref 37–73)
PHOSPHATE SERPL-MCNC: 3.7 MG/DL — SIGNIFICANT CHANGE UP (ref 2.4–4.7)
PLATELET # BLD AUTO: 519 K/UL — HIGH (ref 150–400)
POTASSIUM SERPL-MCNC: 3.9 MMOL/L — SIGNIFICANT CHANGE UP (ref 3.5–5.3)
POTASSIUM SERPL-SCNC: 3.9 MMOL/L — SIGNIFICANT CHANGE UP (ref 3.5–5.3)
PROTEINASE3 AB FLD-ACNC: <5 UNITS — SIGNIFICANT CHANGE UP
PROTEINASE3 AB SER-ACNC: NEGATIVE — SIGNIFICANT CHANGE UP
RBC # BLD: 3.67 M/UL — LOW (ref 4.4–5.2)
RBC # FLD: 14.4 % — SIGNIFICANT CHANGE UP (ref 11–15.6)
SODIUM SERPL-SCNC: 131 MMOL/L — LOW (ref 135–145)
SPECIMEN SOURCE: SIGNIFICANT CHANGE UP
SPECIMEN SOURCE: SIGNIFICANT CHANGE UP
T3 SERPL-MCNC: 27 NG/DL — LOW (ref 80–200)
T4 AB SER-ACNC: 3.7 UG/DL — LOW (ref 4.5–12)
TSH SERPL-MCNC: 3.9 UIU/ML — SIGNIFICANT CHANGE UP (ref 0.27–4.2)
WBC # BLD: 9.7 K/UL — SIGNIFICANT CHANGE UP (ref 4.8–10.8)
WBC # FLD AUTO: 9.7 K/UL — SIGNIFICANT CHANGE UP (ref 4.8–10.8)

## 2017-02-15 PROCEDURE — 84145 PROCALCITONIN (PCT): CPT

## 2017-02-15 PROCEDURE — 86235 NUCLEAR ANTIGEN ANTIBODY: CPT

## 2017-02-15 PROCEDURE — 82009 KETONE BODYS QUAL: CPT

## 2017-02-15 PROCEDURE — 99239 HOSP IP/OBS DSCHRG MGMT >30: CPT

## 2017-02-15 PROCEDURE — 84156 ASSAY OF PROTEIN URINE: CPT

## 2017-02-15 PROCEDURE — 84300 ASSAY OF URINE SODIUM: CPT

## 2017-02-15 PROCEDURE — 86146 BETA-2 GLYCOPROTEIN ANTIBODY: CPT

## 2017-02-15 PROCEDURE — 86900 BLOOD TYPING SEROLOGIC ABO: CPT

## 2017-02-15 PROCEDURE — 82803 BLOOD GASES ANY COMBINATION: CPT

## 2017-02-15 PROCEDURE — 85027 COMPLETE CBC AUTOMATED: CPT

## 2017-02-15 PROCEDURE — 85018 HEMOGLOBIN: CPT

## 2017-02-15 PROCEDURE — 93005 ELECTROCARDIOGRAM TRACING: CPT

## 2017-02-15 PROCEDURE — 85652 RBC SED RATE AUTOMATED: CPT

## 2017-02-15 PROCEDURE — 80053 COMPREHEN METABOLIC PANEL: CPT

## 2017-02-15 PROCEDURE — 86850 RBC ANTIBODY SCREEN: CPT

## 2017-02-15 PROCEDURE — 86920 COMPATIBILITY TEST SPIN: CPT

## 2017-02-15 PROCEDURE — 82150 ASSAY OF AMYLASE: CPT

## 2017-02-15 PROCEDURE — 76775 US EXAM ABDO BACK WALL LIM: CPT

## 2017-02-15 PROCEDURE — 83735 ASSAY OF MAGNESIUM: CPT

## 2017-02-15 PROCEDURE — 83516 IMMUNOASSAY NONANTIBODY: CPT

## 2017-02-15 PROCEDURE — 99285 EMERGENCY DEPT VISIT HI MDM: CPT | Mod: 25

## 2017-02-15 PROCEDURE — 84100 ASSAY OF PHOSPHORUS: CPT

## 2017-02-15 PROCEDURE — 93971 EXTREMITY STUDY: CPT

## 2017-02-15 PROCEDURE — 85610 PROTHROMBIN TIME: CPT

## 2017-02-15 PROCEDURE — 84466 ASSAY OF TRANSFERRIN: CPT

## 2017-02-15 PROCEDURE — 84550 ASSAY OF BLOOD/URIC ACID: CPT

## 2017-02-15 PROCEDURE — 86200 CCP ANTIBODY: CPT

## 2017-02-15 PROCEDURE — 86160 COMPLEMENT ANTIGEN: CPT

## 2017-02-15 PROCEDURE — 82550 ASSAY OF CK (CPK): CPT

## 2017-02-15 PROCEDURE — 71046 X-RAY EXAM CHEST 2 VIEWS: CPT

## 2017-02-15 PROCEDURE — 84165 PROTEIN E-PHORESIS SERUM: CPT

## 2017-02-15 PROCEDURE — 96374 THER/PROPH/DIAG INJ IV PUSH: CPT

## 2017-02-15 PROCEDURE — 83520 IMMUNOASSAY QUANT NOS NONAB: CPT

## 2017-02-15 PROCEDURE — 87070 CULTURE OTHR SPECIMN AEROBIC: CPT

## 2017-02-15 PROCEDURE — 86140 C-REACTIVE PROTEIN: CPT

## 2017-02-15 PROCEDURE — 86901 BLOOD TYPING SEROLOGIC RH(D): CPT

## 2017-02-15 PROCEDURE — 82085 ASSAY OF ALDOLASE: CPT

## 2017-02-15 PROCEDURE — 93306 TTE W/DOPPLER COMPLETE: CPT

## 2017-02-15 PROCEDURE — 86038 ANTINUCLEAR ANTIBODIES: CPT

## 2017-02-15 PROCEDURE — 99255 IP/OBS CONSLTJ NEW/EST HI 80: CPT

## 2017-02-15 PROCEDURE — 86225 DNA ANTIBODY NATIVE: CPT

## 2017-02-15 PROCEDURE — T1013: CPT

## 2017-02-15 PROCEDURE — 80048 BASIC METABOLIC PNL TOTAL CA: CPT

## 2017-02-15 PROCEDURE — 86713 LEGIONELLA ANTIBODY: CPT

## 2017-02-15 PROCEDURE — 84443 ASSAY THYROID STIM HORMONE: CPT

## 2017-02-15 PROCEDURE — 85730 THROMBOPLASTIN TIME PARTIAL: CPT

## 2017-02-15 PROCEDURE — 83935 ASSAY OF URINE OSMOLALITY: CPT

## 2017-02-15 PROCEDURE — 36415 COLL VENOUS BLD VENIPUNCTURE: CPT

## 2017-02-15 PROCEDURE — 71250 CT THORAX DX C-: CPT

## 2017-02-15 PROCEDURE — 84436 ASSAY OF TOTAL THYROXINE: CPT

## 2017-02-15 PROCEDURE — 83880 ASSAY OF NATRIURETIC PEPTIDE: CPT

## 2017-02-15 PROCEDURE — 71048 X-RAY EXAM CHEST 4+ VIEWS: CPT

## 2017-02-15 PROCEDURE — 86036 ANCA SCREEN EACH ANTIBODY: CPT

## 2017-02-15 PROCEDURE — 86376 MICROSOMAL ANTIBODY EACH: CPT

## 2017-02-15 PROCEDURE — 84480 ASSAY TRIIODOTHYRONINE (T3): CPT

## 2017-02-15 PROCEDURE — 83690 ASSAY OF LIPASE: CPT

## 2017-02-15 PROCEDURE — 96372 THER/PROPH/DIAG INJ SC/IM: CPT | Mod: XU

## 2017-02-15 PROCEDURE — 81001 URINALYSIS AUTO W/SCOPE: CPT

## 2017-02-15 PROCEDURE — 96375 TX/PRO/DX INJ NEW DRUG ADDON: CPT

## 2017-02-15 PROCEDURE — 86255 FLUORESCENT ANTIBODY SCREEN: CPT

## 2017-02-15 PROCEDURE — 87040 BLOOD CULTURE FOR BACTERIA: CPT

## 2017-02-15 PROCEDURE — 83036 HEMOGLOBIN GLYCOSYLATED A1C: CPT

## 2017-02-15 PROCEDURE — 86147 CARDIOLIPIN ANTIBODY EA IG: CPT

## 2017-02-15 PROCEDURE — 36430 TRANSFUSION BLD/BLD COMPNT: CPT

## 2017-02-15 PROCEDURE — 93970 EXTREMITY STUDY: CPT

## 2017-02-15 PROCEDURE — 82728 ASSAY OF FERRITIN: CPT

## 2017-02-15 PROCEDURE — P9016: CPT

## 2017-02-15 PROCEDURE — 87899 AGENT NOS ASSAY W/OPTIC: CPT

## 2017-02-15 PROCEDURE — 86431 RHEUMATOID FACTOR QUANT: CPT

## 2017-02-15 PROCEDURE — 83550 IRON BINDING TEST: CPT

## 2017-02-15 RX ORDER — INSULIN GLARGINE 100 [IU]/ML
20 INJECTION, SOLUTION SUBCUTANEOUS
Qty: 600 | Refills: 0 | OUTPATIENT
Start: 2017-02-15 | End: 2017-03-17

## 2017-02-15 RX ORDER — INSULIN LISPRO 100/ML
15 VIAL (ML) SUBCUTANEOUS
Qty: 500 | Refills: 0 | OUTPATIENT
Start: 2017-02-15 | End: 2017-03-17

## 2017-02-15 RX ORDER — INSULIN GLARGINE 100 [IU]/ML
0 INJECTION, SOLUTION SUBCUTANEOUS
Qty: 0 | Refills: 0 | COMMUNITY

## 2017-02-15 RX ORDER — METOPROLOL TARTRATE 50 MG
1 TABLET ORAL
Qty: 30 | Refills: 0 | OUTPATIENT
Start: 2017-02-15 | End: 2017-03-17

## 2017-02-15 RX ADMIN — Medication 60 MILLIGRAM(S): at 06:05

## 2017-02-15 RX ADMIN — HEPARIN SODIUM 5000 UNIT(S): 5000 INJECTION INTRAVENOUS; SUBCUTANEOUS at 06:05

## 2017-02-15 RX ADMIN — HEPARIN SODIUM 5000 UNIT(S): 5000 INJECTION INTRAVENOUS; SUBCUTANEOUS at 17:10

## 2017-02-15 RX ADMIN — Medication 15 UNIT(S): at 13:44

## 2017-02-15 RX ADMIN — Medication 2: at 17:11

## 2017-02-15 RX ADMIN — Medication 6: at 13:43

## 2017-02-15 RX ADMIN — Medication 15 UNIT(S): at 08:14

## 2017-02-15 RX ADMIN — Medication 15 UNIT(S): at 17:10

## 2017-02-15 RX ADMIN — Medication: at 12:14

## 2017-02-15 RX ADMIN — PANTOPRAZOLE SODIUM 40 MILLIGRAM(S): 20 TABLET, DELAYED RELEASE ORAL at 13:45

## 2017-02-15 NOTE — PROGRESS NOTE ADULT - ASSESSMENT
1) Improved MARSHALL on CKD : r/o chronic component ==> suggested by echogenic kidneys on sono              Elevated BUN also due to steroids              ? vasculitis ==> +HUGO 1:640 (negative ds DNA)                                       anti GBM negative         + PANCA ---->additional serology pending                                - monitor renal function               - consider Rheum evaluation for elevated HUGO at high titer    2) Hyponatremia: improved                - optimal glucose control                - no excessive hypotonic fluids                - avoid excessive hypotonic fluids

## 2017-02-15 NOTE — DISCHARGE NOTE ADULT - SECONDARY DIAGNOSIS.
Anemia, unspecified type Hyponatremia Positive HUGO (antinuclear antibody) Type 2 diabetes mellitus with hyperglycemia, without long-term current use of insulin Renal insufficiency

## 2017-02-15 NOTE — DISCHARGE NOTE ADULT - MEDICATION SUMMARY - MEDICATIONS TO TAKE
I will START or STAY ON the medications listed below when I get home from the hospital:    .synringes qid  -- Indication: For DM (diabetes mellitus)    . alcohol swabs qid  -- 1 application between cheek and gums once a day  -- Indication: For DM (diabetes mellitus)    . lancets  SC qid  -- Indication: For DM (diabetes mellitus)    . Glucometer   -- Indication: For DM (diabetes mellitus)    Blood glucose testing strips  -- Indication: For DM (diabetes mellitus)    predniSONE 10 mg oral tablet  -- 6 tab(s) by mouth once a day x 5 days  5 tab(s) by mouth once a day x 5 days  Then 4 tab(s) by mouth once a day until follow up with Rheumatology  -- It is very important that you take or use this exactly as directed.  Do not skip doses or discontinue unless directed by your doctor.  Obtain medical advice before taking any non-prescription drugs as some may affect the action of this medication.  Take with food or milk.    -- Indication: For ild    Lantus 100 units/mL subcutaneous solution  -- 20 unit(s) subcutaneous once a day (at bedtime)  -- Indication: For DM (diabetes mellitus)    insulin lispro 100 units/mL subcutaneous solution  -- 15 unit(s) subcutaneous 3 times a day (before meals)  -- Indication: For DM (diabetes mellitus)    Toprol-XL 25 mg oral tablet, extended release  -- 1 tab(s) by mouth once a day  -- It is very important that you take or use this exactly as directed.  Do not skip doses or discontinue unless directed by your doctor.  May cause drowsiness.  Alcohol may intensify this effect.  Use care when operating dangerous machinery.  Some non-prescription drugs may aggravate your condition.  Read all labels carefully.  If a warning appears, check with your doctor before taking.  Swallow whole.  Do not crush.  Take with food or milk.  This drug may impair the ability to drive or operate machinery.  Use care until you become familiar with its effects.    -- Indication: For Htn

## 2017-02-15 NOTE — PROGRESS NOTE ADULT - PROVIDER SPECIALTY LIST ADULT
Anesthesia
Cardiology
Gastroenterology
Hospitalist
Infectious Disease
Nephrology
Pulmonology
Hospitalist

## 2017-02-15 NOTE — CONSULT NOTE ADULT - PROBLEM SELECTOR PROBLEM 2
Abdominal pain of unknown cause
Leukemoid reaction
Other forms of systemic lupus erythematosus, unspecified organ involvement status

## 2017-02-15 NOTE — DISCHARGE NOTE ADULT - CARE PROVIDERS DIRECT ADDRESSES
[email or web address],DirectAddress_Unknown

## 2017-02-15 NOTE — DISCHARGE NOTE ADULT - CARE PROVIDER_API CALL
Kleiner, Myron I (MD), Internal Medicine; Rheumatology  [address] [facility] [address]  [place] [address]  Phone: [phone]  Fax: [phone]    Kelvin Kevin (DO), Nephrology  [address] [place] [address]  Phone: [phone]  Fax: [phone]    Milton Velez (DO), Critical Care Medicine; Internal Medicine; Pulmonary Disease  [address] [place] [address]  Phone: [phone]  Fax: [phone]    Haley Patton (DO), Gastroenterology; Internal Medicine  [address]  [place] [address]  Phone: [phone]  Fax: [phone]

## 2017-02-15 NOTE — DISCHARGE NOTE ADULT - PATIENT PORTAL LINK FT
“You can access the Follow[employer] Patient Portal, offered by [facility], by registering with the following website: [email or web address]”

## 2017-02-15 NOTE — CONSULT NOTE ADULT - ASSESSMENT
Impression:  56y Female admitted for GI bleed, also bilat pulm infiltrates which have improved; also ARF.  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  Will order additional serologies.    Suggestions/Plan:    1), CPK, ALDOLASE, RF, CCP, , EYAL, RO/LA, MUSTAPHA-1,SCL-70,C3, C4, CARDIOLIPINS, BETA-2-GLYCOPROTEIN I, , DE-3,MPO, HLA-B27\,\ THYROID FCN/TSH, IMMUNOGLOBULINS/IMMUNOFIXATION,TPO,anti-thyroglobulin ab  2) If her pulm infiltrates do not continue to improve, consider lung bx    Thank you for this consultation.

## 2017-02-15 NOTE — CONSULT NOTE ADULT - CONSULT REASON
Anemia;  ? Melena x 1 month;  Weight loss.  New DM.
ARF  Hyponatremia
Evaluate rheumatic disease
abnormal CT scan, anemia
left ventricular systolic dysfunction
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

## 2017-02-15 NOTE — CONSULT NOTE ADULT - CONSULT REQUESTED DATE/TIME
08-Feb-2017 17:25
09-Feb-2017 10:21
09-Feb-2017 17:19
10-Feb-2017 10:21
15-Feb-2017
06-Feb-2017 20:40

## 2017-02-15 NOTE — PROGRESS NOTE ADULT - SUBJECTIVE AND OBJECTIVE BOX
NEPHROLOGY INTERVAL HPI/OVERNIGHT EVENTS:    feels better   Pulmonary noted   Rheum consult noted   remains  on steroids   Additional serology noted         MEDICATIONS  (STANDING):  heparin  Injectable 5000Unit(s) SubCutaneous every 12 hours  insulin glargine Injectable (LANTUS) 20Unit(s) SubCutaneous at bedtime  insulin lispro Injectable (HumaLOG) 15Unit(s) SubCutaneous three times a day before meals  dextrose 5%. 1000milliLiter(s) IV Continuous <Continuous>  dextrose 50% Injectable 12.5Gram(s) IV Push once  dextrose 50% Injectable 25Gram(s) IV Push once  dextrose 50% Injectable 25Gram(s) IV Push once  pantoprazole  Injectable 40milliGRAM(s) IV Push daily  insulin lispro (HumaLOG) corrective regimen sliding scale  SubCutaneous three times a day before meals  dextrose 5%. 1000milliLiter(s) IV Continuous <Continuous>  dextrose 50% Injectable 12.5Gram(s) IV Push once  dextrose 50% Injectable 25Gram(s) IV Push once  dextrose 50% Injectable 25Gram(s) IV Push once  methylPREDNISolone sodium succinate Injectable 60milliGRAM(s) IV Push every 12 hours    MEDICATIONS  (PRN):  ondansetron Injectable 4milliGRAM(s) IV Push every 6 hours PRN Nausea  hydrALAZINE 25milliGRAM(s) Oral every 6 hours PRN Systolic blood pressure >160  ALBUTerol    0.083% 2.5milliGRAM(s) Nebulizer every 6 hours PRN wheeze  dextrose Gel 1Dose(s) Oral once PRN Blood Glucose LESS THAN 70 milliGRAM(s)/deciliter  glucagon  Injectable 1milliGRAM(s) IntraMuscular once PRN Glucose LESS THAN 70 milligrams/deciliter  dextrose Gel 1Dose(s) Oral once PRN Blood Glucose LESS THAN 70 milliGRAM(s)/deciliter  glucagon  Injectable 1milliGRAM(s) IntraMuscular once PRN Glucose LESS THAN 70 milligrams/deciliter      Allergies    No Known Allergies    Intolerances            Vital Signs Last 24 Hrs  T(C): 36.8, Max: 36.8 (-15 @ 07:38)  T(F): 98.2, Max: 98.2 (15 @ 07:38)  HR: 62 (62 - 74)  BP: 156/78 (150/85 - 156/78)  BP(mean): --  RR: 18 (18 - 97)  SpO2: 97% (97% - 97%)  Daily     Daily Weight in k.3 (15 Feb 2017 01:08)  I&O's Detail    I&O's Summary      PHYSICAL EXAM:  GENERAL: weakness  HEAD:  Atraumatic, Normocephalic  EYES: EOMI, PERRLA, conjunctiva and sclera clear  NECK: Supple, No JVD  NERVOUS SYSTEM:  Alert & Oriented X3,  intact and symmetric  CHEST/LUNG: Diminished BS bilaterally   HEART: Regular rate and rhythm; No murmurs, rubs, or gallops  ABDOMEN: Soft, +BS  EXTREMITIES:  2+ Peripheral Pulses, No clubbing, cyanosis, or edema  SKIN: No rashes or lesions      LABS:                        10.0   9.7   )-----------( 519      ( 15 Feb 2017 05:21 )             29.9     15 Feb 2017 05:21    131    |  97     |  64.0   ----------------------------<  158    3.9     |  20.0   |  1.31     Ca    8.6        15 Feb 2017 05:21  Phos  3.7       15 Feb 2017 05:21  Mg     1.8       15 Feb 2017 05:21          Magnesium, Serum: 1.8 mg/dL (02-15 @ 05:21)  Phosphorus Level, Serum: 3.7 mg/dL (02-15 @ 05:21)  C3 Complement, Serum: 90 mg/dL ( @ 19:46)  C4 Complement, Serum: 31 mg/dL ( @ 19:46)          RADIOLOGY & ADDITIONAL TESTS:

## 2017-02-15 NOTE — DISCHARGE NOTE ADULT - HOSPITAL COURSE
The patient is a 56 year old female with history of diabetes mellitus type 2 on insulin who presented to the ER from the office of her PMD. Seen and examined with a  at the bedside. According to the patient for the past 2 months she has had complaints of vomiting atleast 2-3 times a day associated with generalized abdominal pain. Her symptoms have progressively worsened and she has had associated weakness. She uses lantus at home for her diabetes and says she is compliant. She went to her PMD  with complaints of weakness and dark stool. Her hemoglobin was noted to be 5gm/dl therefore she was sent to the ER. In the ER hb is 6.7gm/dl with positive melena and guaic positive. Also noted to be hyperglycemic with hyponatremia given 2 liters of normal saline with insulin.     Evaluated by Gi, initially recommended EGD when hb/hct stable. However patient was noted to have hypoxia with increased o2 requirement. CT of the chest was done which showed bilateral interstitial infiltrates. Echocardiogram was showed decrased systolic function ef of 45%. Evlauated by Dr Meyers cardiology, recommends no diuresis at this time given renal insufficinecy and no cardiac work up reocmmended at this time.  Lower extremity duplex negative. Discussed with Dr Velez, recommended iV steroids and antibiotic coverage with nebs. Worsening renal failure and hyponatremia. Suspicion for underlying vascluitis. Dr sarmiento, nephrology consulted. Vasculitis work showed negative ds DNA, Negative Anti GBM antibody, +ve HUGO, +PANCA, renal ultrasound done showed Bilateral increased cortical echogenicity consistent with chronic medical renal disease, No hydronephrosis, mass or calculus.   Hb/hct was stable with no active bleeding started on heparin subcut for dvt prophylaxis, Rheumatology consult Dr. Kleiner called, rheum w/u in progress. Pt doing much better. CXR clearing, now saturating at 97% on RA at rest & ambulation. Pt medically stable to be d/jonatan on Po steroids with out-patient follow up with Rheum as per Dr. Kleiner. VSS. Lungs clear. Discussed with pt, translation by RN. Pt agrees to plan.       Lnatus and humalog increased to hyperglycemia secondary to steroids.

## 2017-02-15 NOTE — DISCHARGE NOTE ADULT - PLAN OF CARE
resolved Follow up with PMD within 1 week & Pulmonology as outpatient AOCD. Follow up with PMD as outpateint Follow up with PMD with +ve PANCA (1:320),anemia, lung infiltrates, Kidney insufficiency. Follow up with Rheumatology as outpatient within 2-3 weeks. Continue with prednisone as directed. Continue home meds. Follow up with PMD Follow up with PMD & nephrology as outpatient

## 2017-02-15 NOTE — CONSULT NOTE ADULT - PROBLEM SELECTOR PROBLEM 3
MARSHALL (acute kidney injury)
Type 2 diabetes mellitus with hyperglycemia, without long-term current use of insulin
Vasculitis

## 2017-02-15 NOTE — DISCHARGE NOTE ADULT - CARE PLAN
Principal Discharge DX:	Acute respiratory distress  Goal:	resolved  Instructions for follow-up, activity and diet:	Follow up with PMD within 1 week & Pulmonology as outpatient  Secondary Diagnosis:	Anemia, unspecified type  Instructions for follow-up, activity and diet:	AOCD. Follow up with PMD as outpateint  Secondary Diagnosis:	Hyponatremia  Instructions for follow-up, activity and diet:	Follow up with PMD  Secondary Diagnosis:	Positive HUGO (antinuclear antibody)  Instructions for follow-up, activity and diet:	with +ve PANCA (1:320),anemia, lung infiltrates, Kidney insufficiency. Follow up with Rheumatology as outpatient within 2-3 weeks. Continue with prednisone as directed.  Secondary Diagnosis:	Type 2 diabetes mellitus with hyperglycemia, without long-term current use of insulin  Instructions for follow-up, activity and diet:	Continue home meds. Follow up with PMD  Secondary Diagnosis:	Renal insufficiency  Instructions for follow-up, activity and diet:	Follow up with PMD & nephrology as outpatient

## 2017-02-16 LAB
ANTI-RIBONUCLEAR PROTEIN: <0.2 AI — SIGNIFICANT CHANGE UP
AUTO DIFF PNL BLD: NEGATIVE — SIGNIFICANT CHANGE UP
B2 GLYCOPROT1 AB SER QL: NEGATIVE — SIGNIFICANT CHANGE UP
C-ANCA SER-ACNC: NEGATIVE — SIGNIFICANT CHANGE UP
C3 SERPL-MCNC: 92 MG/DL — SIGNIFICANT CHANGE UP (ref 80–180)
C4 SERPL-MCNC: 32 MG/DL — SIGNIFICANT CHANGE UP (ref 10–45)
CARDIOLIPIN AB SER-ACNC: NEGATIVE — SIGNIFICANT CHANGE UP
CCP IGG SERPL-ACNC: <8 UNITS — SIGNIFICANT CHANGE UP (ref 0–19)
CENTROMERE AB SER-ACNC: <0.2 AI — SIGNIFICANT CHANGE UP
DSDNA AB FLD-ACNC: <0.2 AI — SIGNIFICANT CHANGE UP
ENA JO1 AB SER-ACNC: <0.2 AI — SIGNIFICANT CHANGE UP
ENA SCL70 AB SER-ACNC: <0.2 AI — SIGNIFICANT CHANGE UP
ENA SM AB FLD QL: <0.2 AI — SIGNIFICANT CHANGE UP
ENA SS-A AB FLD IA-ACNC: <0.2 AI — SIGNIFICANT CHANGE UP
IGA FLD-MCNC: 285 MG/DL — SIGNIFICANT CHANGE UP (ref 68–378)
IGG FLD-MCNC: 1390 MG/DL — SIGNIFICANT CHANGE UP (ref 694–1618)
IGM SERPL-MCNC: 67 MG/DL — SIGNIFICANT CHANGE UP (ref 40–230)
KAPPA LC SER QL IFE: 4.58 MG/DL — HIGH (ref 0.33–1.94)
KAPPA/LAMBDA FREE LIGHT CHAIN RATIO, SERUM: 1.02 RATIO — SIGNIFICANT CHANGE UP (ref 0.26–1.65)
LAMBDA LC SER QL IFE: 4.5 MG/DL — HIGH (ref 0.57–2.63)
MYELOPEROXIDASE AB SER-ACNC: 51.3 UNITS — HIGH
MYELOPEROXIDASE CELLS FLD QL: POSITIVE
O2 CT VFR BLD CALC: ABNORMAL
P-ANCA SER-ACNC: POSITIVE
PROT SERPL-MCNC: 6.1 G/DL — SIGNIFICANT CHANGE UP (ref 6–8.3)
PROT SERPL-MCNC: 6.1 G/DL — SIGNIFICANT CHANGE UP (ref 6–8.3)
PROTEINASE3 AB FLD-ACNC: <5 UNITS — SIGNIFICANT CHANGE UP
PROTEINASE3 AB SER-ACNC: NEGATIVE — SIGNIFICANT CHANGE UP
RF+CCP IGG SER-IMP: NEGATIVE — SIGNIFICANT CHANGE UP
RHEUMATOID FACT SERPL-ACNC: <7 IU/ML — SIGNIFICANT CHANGE UP (ref 0–13.9)
RIBOSOMAL P AB SER-ACNC: <0.2 AI — SIGNIFICANT CHANGE UP
THYROGLOB AB FLD IA-ACNC: <20 IU/ML — SIGNIFICANT CHANGE UP (ref 0–40)
THYROGLOB AB SERPL-ACNC: <20 IU/ML — SIGNIFICANT CHANGE UP (ref 0–40)
THYROPEROXIDASE AB SERPL-ACNC: 16.2 IU/ML — SIGNIFICANT CHANGE UP (ref 0–34)

## 2017-02-17 LAB
ALDOLASE SERPL-CCNC: 6.9 U/L — SIGNIFICANT CHANGE UP (ref 3.3–10.3)
CULTURE RESULTS: SIGNIFICANT CHANGE UP
SPECIMEN SOURCE: SIGNIFICANT CHANGE UP

## 2017-02-18 LAB
% ALBUMIN: 46.3 % — SIGNIFICANT CHANGE UP
% ALPHA 1: 6.5 % — SIGNIFICANT CHANGE UP
% ALPHA 2: 14.4 % — SIGNIFICANT CHANGE UP
% BETA: 12.1 % — SIGNIFICANT CHANGE UP
% GAMMA: 20.7 % — SIGNIFICANT CHANGE UP
ALBUMIN SERPL ELPH-MCNC: 2.8 G/DL — LOW (ref 3.6–5.5)
ALBUMIN/GLOB SERPL ELPH: 0.8 RATIO — SIGNIFICANT CHANGE UP
ALPHA1 GLOB SERPL ELPH-MCNC: 0.4 G/DL — SIGNIFICANT CHANGE UP (ref 0.1–0.4)
ALPHA2 GLOB SERPL ELPH-MCNC: 0.9 G/DL — SIGNIFICANT CHANGE UP (ref 0.5–1)
B-GLOBULIN SERPL ELPH-MCNC: 0.7 G/DL — SIGNIFICANT CHANGE UP (ref 0.5–1)
GAMMA GLOBULIN: 1.3 G/DL — SIGNIFICANT CHANGE UP (ref 0.6–1.6)
INTERPRETATION SERPL IFE-IMP: SIGNIFICANT CHANGE UP
PROT PATTERN SERPL ELPH-IMP: SIGNIFICANT CHANGE UP

## 2017-03-01 PROBLEM — Z00.00 ENCOUNTER FOR PREVENTIVE HEALTH EXAMINATION: Status: ACTIVE | Noted: 2017-03-01

## 2017-08-23 NOTE — ED PROVIDER NOTE - PHYSICAL EXAMINATION
1. I was told the name of the doctor(s) who took care of my child while in the hospital.    2. I have been told about any important findings on my child's physical exam and my child’s plan of care.    3. The doctor clearly explained my child's diagnosis and other possible diagnoses that were considered.    4. My child's doctor explained all the tests that were done and their results (if available). I understand that some of the test results may not be ready before we go home and I was told how I can get these results. I understand that a summary of my child's hospitalization and important test results will be shared with my child's outpatient doctor.    5. My child's doctor talked to me about what I need to do when we go home.    6. I understand what signs and symptoms to watch for. I understand what symptoms I would need to call my doctor for and/or return to the hospital.    7. I have the phone number to call the hospital for results and/or questions after I leave the hospital. rectal: good tone slight melena no active bleeding

## 2017-09-15 PROBLEM — E11.9 TYPE 2 DIABETES MELLITUS WITHOUT COMPLICATIONS: Chronic | Status: ACTIVE | Noted: 2017-02-06

## 2017-09-18 ENCOUNTER — APPOINTMENT (OUTPATIENT)
Dept: RHEUMATOLOGY | Facility: CLINIC | Age: 57
End: 2017-09-18
Payer: COMMERCIAL

## 2017-09-18 VITALS
HEIGHT: 66 IN | HEART RATE: 61 BPM | TEMPERATURE: 98 F | WEIGHT: 150 LBS | RESPIRATION RATE: 15 BRPM | OXYGEN SATURATION: 97 % | SYSTOLIC BLOOD PRESSURE: 136 MMHG | BODY MASS INDEX: 24.11 KG/M2 | DIASTOLIC BLOOD PRESSURE: 94 MMHG

## 2017-09-18 DIAGNOSIS — M79.1 MYALGIA: ICD-10-CM

## 2017-09-18 DIAGNOSIS — M25.50 PAIN IN UNSPECIFIED JOINT: ICD-10-CM

## 2017-09-18 PROCEDURE — 99204 OFFICE O/P NEW MOD 45 MIN: CPT

## 2017-10-02 ENCOUNTER — APPOINTMENT (OUTPATIENT)
Dept: RHEUMATOLOGY | Facility: CLINIC | Age: 57
End: 2017-10-02

## 2017-10-02 VITALS
SYSTOLIC BLOOD PRESSURE: 150 MMHG | BODY MASS INDEX: 25.49 KG/M2 | HEIGHT: 65 IN | DIASTOLIC BLOOD PRESSURE: 98 MMHG | HEART RATE: 58 BPM | WEIGHT: 153 LBS | TEMPERATURE: 98.5 F | OXYGEN SATURATION: 96 %

## 2017-10-30 ENCOUNTER — APPOINTMENT (OUTPATIENT)
Dept: RHEUMATOLOGY | Facility: CLINIC | Age: 57
End: 2017-10-30
Payer: COMMERCIAL

## 2017-10-30 VITALS
HEART RATE: 89 BPM | WEIGHT: 153 LBS | OXYGEN SATURATION: 98 % | HEIGHT: 65 IN | SYSTOLIC BLOOD PRESSURE: 140 MMHG | TEMPERATURE: 98.2 F | DIASTOLIC BLOOD PRESSURE: 98 MMHG | BODY MASS INDEX: 25.49 KG/M2

## 2017-10-30 PROCEDURE — 99214 OFFICE O/P EST MOD 30 MIN: CPT

## 2017-11-30 ENCOUNTER — APPOINTMENT (OUTPATIENT)
Dept: RHEUMATOLOGY | Facility: CLINIC | Age: 57
End: 2017-11-30
Payer: COMMERCIAL

## 2017-11-30 VITALS
OXYGEN SATURATION: 98 % | SYSTOLIC BLOOD PRESSURE: 120 MMHG | BODY MASS INDEX: 25.49 KG/M2 | HEART RATE: 82 BPM | HEIGHT: 65 IN | WEIGHT: 153 LBS | DIASTOLIC BLOOD PRESSURE: 80 MMHG | TEMPERATURE: 98.5 F

## 2017-11-30 DIAGNOSIS — M15.9 POLYOSTEOARTHRITIS, UNSPECIFIED: ICD-10-CM

## 2017-11-30 DIAGNOSIS — G89.4 CHRONIC PAIN SYNDROME: ICD-10-CM

## 2017-11-30 PROCEDURE — 99213 OFFICE O/P EST LOW 20 MIN: CPT

## 2018-01-16 ENCOUNTER — APPOINTMENT (OUTPATIENT)
Dept: RHEUMATOLOGY | Facility: CLINIC | Age: 58
End: 2018-01-16

## 2018-03-01 ENCOUNTER — OUTPATIENT (OUTPATIENT)
Dept: OUTPATIENT SERVICES | Facility: HOSPITAL | Age: 58
LOS: 1 days | End: 2018-03-01
Payer: MEDICAID

## 2018-03-13 ENCOUNTER — INPATIENT (INPATIENT)
Facility: HOSPITAL | Age: 58
LOS: 20 days | Discharge: HOME | DRG: 673 | End: 2018-04-03
Attending: INTERNAL MEDICINE | Admitting: FAMILY MEDICINE
Payer: COMMERCIAL

## 2018-03-13 VITALS — HEIGHT: 62 IN | WEIGHT: 149.91 LBS

## 2018-03-13 DIAGNOSIS — D64.9 ANEMIA, UNSPECIFIED: ICD-10-CM

## 2018-03-13 LAB
ALBUMIN SERPL ELPH-MCNC: 2.8 G/DL — LOW (ref 3.3–5.2)
ALP SERPL-CCNC: 93 U/L — SIGNIFICANT CHANGE UP (ref 40–120)
ALT FLD-CCNC: 8 U/L — SIGNIFICANT CHANGE UP
ANION GAP SERPL CALC-SCNC: 16 MMOL/L — SIGNIFICANT CHANGE UP (ref 5–17)
ANISOCYTOSIS BLD QL: SLIGHT — SIGNIFICANT CHANGE UP
AST SERPL-CCNC: 12 U/L — SIGNIFICANT CHANGE UP
BASOPHILS # BLD AUTO: 0 K/UL — SIGNIFICANT CHANGE UP (ref 0–0.2)
BASOPHILS NFR BLD AUTO: 0 % — SIGNIFICANT CHANGE UP (ref 0–2)
BILIRUB SERPL-MCNC: <0.2 MG/DL — LOW (ref 0.4–2)
BLD GP AB SCN SERPL QL: SIGNIFICANT CHANGE UP
BUN SERPL-MCNC: 69 MG/DL — HIGH (ref 8–20)
CALCIUM SERPL-MCNC: 8.8 MG/DL — SIGNIFICANT CHANGE UP (ref 8.6–10.2)
CHLORIDE SERPL-SCNC: 92 MMOL/L — LOW (ref 98–107)
CK SERPL-CCNC: 135 U/L — SIGNIFICANT CHANGE UP (ref 25–170)
CO2 SERPL-SCNC: 21 MMOL/L — LOW (ref 22–29)
CREAT SERPL-MCNC: 5.79 MG/DL — HIGH (ref 0.5–1.3)
EOSINOPHIL # BLD AUTO: 0.1 K/UL — SIGNIFICANT CHANGE UP (ref 0–0.5)
EOSINOPHIL NFR BLD AUTO: 3 % — SIGNIFICANT CHANGE UP (ref 0–5)
FERRITIN SERPL-MCNC: 183 NG/ML — SIGNIFICANT CHANGE UP (ref 11–306)
FOLATE SERPL-MCNC: 13.1 NG/ML — SIGNIFICANT CHANGE UP (ref 4–16)
GLUCOSE SERPL-MCNC: 235 MG/DL — HIGH (ref 70–115)
HCT VFR BLD CALC: 21.1 % — LOW (ref 37–47)
HGB BLD-MCNC: 7 G/DL — CRITICAL LOW (ref 12–16)
HYPOCHROMIA BLD QL: SLIGHT — SIGNIFICANT CHANGE UP
IRON SATN MFR SERPL: 18 UG/DL — LOW (ref 37–145)
IRON SATN MFR SERPL: 7 % — LOW (ref 14–50)
LACTATE BLDV-MCNC: 0.5 MMOL/L — SIGNIFICANT CHANGE UP (ref 0.5–2)
LIDOCAIN IGE QN: 71 U/L — HIGH (ref 22–51)
LYMPHOCYTES # BLD AUTO: 0.5 K/UL — LOW (ref 1–4.8)
LYMPHOCYTES # BLD AUTO: 10 % — LOW (ref 20–55)
MACROCYTES BLD QL: SLIGHT — SIGNIFICANT CHANGE UP
MANUAL DIF COMMENT BLD-IMP: SIGNIFICANT CHANGE UP
MCHC RBC-ENTMCNC: 28 PG — SIGNIFICANT CHANGE UP (ref 27–31)
MCHC RBC-ENTMCNC: 33.5 G/DL — SIGNIFICANT CHANGE UP (ref 32–36)
MCV RBC AUTO: 83.6 FL — SIGNIFICANT CHANGE UP (ref 81–99)
MONOCYTES # BLD AUTO: 0.5 K/UL — SIGNIFICANT CHANGE UP (ref 0–0.8)
MONOCYTES NFR BLD AUTO: 7 % — SIGNIFICANT CHANGE UP (ref 3–10)
NEUTROPHILS # BLD AUTO: 7.2 K/UL — SIGNIFICANT CHANGE UP (ref 1.8–8)
NEUTROPHILS NFR BLD AUTO: 80 % — HIGH (ref 37–73)
OB PNL STL: NEGATIVE — SIGNIFICANT CHANGE UP
PLAT MORPH BLD: NORMAL — SIGNIFICANT CHANGE UP
PLATELET # BLD AUTO: 505 K/UL — HIGH (ref 150–400)
POIKILOCYTOSIS BLD QL AUTO: SLIGHT — SIGNIFICANT CHANGE UP
POLYCHROMASIA BLD QL SMEAR: SLIGHT — SIGNIFICANT CHANGE UP
POTASSIUM SERPL-MCNC: 4.8 MMOL/L — SIGNIFICANT CHANGE UP (ref 3.5–5.3)
POTASSIUM SERPL-SCNC: 4.8 MMOL/L — SIGNIFICANT CHANGE UP (ref 3.5–5.3)
PROT SERPL-MCNC: 6.5 G/DL — LOW (ref 6.6–8.7)
RBC # BLD: 2.5 M/UL — LOW (ref 4.4–5.2)
RBC # BLD: 2.52 M/UL — LOW (ref 4.4–5.2)
RBC # FLD: 12.3 % — SIGNIFICANT CHANGE UP (ref 11–15.6)
RBC BLD AUTO: ABNORMAL
RETICS #: 4.4 K/UL — LOW (ref 25–125)
RETICS/RBC NFR: 1.7 % — SIGNIFICANT CHANGE UP (ref 0.5–2.5)
SODIUM SERPL-SCNC: 129 MMOL/L — LOW (ref 135–145)
TARGETS BLD QL SMEAR: SLIGHT — SIGNIFICANT CHANGE UP
TIBC SERPL-MCNC: 256 UG/DL — SIGNIFICANT CHANGE UP (ref 220–430)
TRANSFERRIN SERPL-MCNC: 179 MG/DL — LOW (ref 192–382)
TROPONIN T SERPL-MCNC: 0.16 NG/ML — HIGH (ref 0–0.06)
TYPE + AB SCN PNL BLD: SIGNIFICANT CHANGE UP
WBC # BLD: 7.9 K/UL — SIGNIFICANT CHANGE UP (ref 4.8–10.8)
WBC # FLD AUTO: 7.9 K/UL — SIGNIFICANT CHANGE UP (ref 4.8–10.8)

## 2018-03-13 PROCEDURE — 99223 1ST HOSP IP/OBS HIGH 75: CPT | Mod: GC

## 2018-03-13 PROCEDURE — 93010 ELECTROCARDIOGRAM REPORT: CPT

## 2018-03-13 PROCEDURE — 71046 X-RAY EXAM CHEST 2 VIEWS: CPT | Mod: 26

## 2018-03-13 PROCEDURE — 99497 ADVNCD CARE PLAN 30 MIN: CPT | Mod: GC

## 2018-03-13 PROCEDURE — 99291 CRITICAL CARE FIRST HOUR: CPT

## 2018-03-13 RX ORDER — DEXTROSE 50 % IN WATER 50 %
25 SYRINGE (ML) INTRAVENOUS ONCE
Qty: 0 | Refills: 0 | Status: DISCONTINUED | OUTPATIENT
Start: 2018-03-13 | End: 2018-03-28

## 2018-03-13 RX ORDER — SIMVASTATIN 20 MG/1
40 TABLET, FILM COATED ORAL AT BEDTIME
Qty: 0 | Refills: 0 | Status: DISCONTINUED | OUTPATIENT
Start: 2018-03-13 | End: 2018-03-28

## 2018-03-13 RX ORDER — SODIUM CHLORIDE 9 MG/ML
1000 INJECTION, SOLUTION INTRAVENOUS
Qty: 0 | Refills: 0 | Status: DISCONTINUED | OUTPATIENT
Start: 2018-03-13 | End: 2018-03-28

## 2018-03-13 RX ORDER — SODIUM CHLORIDE 9 MG/ML
3 INJECTION INTRAMUSCULAR; INTRAVENOUS; SUBCUTANEOUS ONCE
Qty: 0 | Refills: 0 | Status: COMPLETED | OUTPATIENT
Start: 2018-03-13 | End: 2018-03-13

## 2018-03-13 RX ORDER — DEXTROSE 50 % IN WATER 50 %
1 SYRINGE (ML) INTRAVENOUS ONCE
Qty: 0 | Refills: 0 | Status: DISCONTINUED | OUTPATIENT
Start: 2018-03-13 | End: 2018-03-28

## 2018-03-13 RX ORDER — GLUCAGON INJECTION, SOLUTION 0.5 MG/.1ML
1 INJECTION, SOLUTION SUBCUTANEOUS ONCE
Qty: 0 | Refills: 0 | Status: DISCONTINUED | OUTPATIENT
Start: 2018-03-13 | End: 2018-03-28

## 2018-03-13 RX ORDER — PANTOPRAZOLE SODIUM 20 MG/1
40 TABLET, DELAYED RELEASE ORAL DAILY
Qty: 0 | Refills: 0 | Status: DISCONTINUED | OUTPATIENT
Start: 2018-03-13 | End: 2018-03-14

## 2018-03-13 RX ORDER — FAMOTIDINE 10 MG/ML
20 INJECTION INTRAVENOUS ONCE
Qty: 0 | Refills: 0 | Status: COMPLETED | OUTPATIENT
Start: 2018-03-13 | End: 2018-03-13

## 2018-03-13 RX ORDER — LOSARTAN POTASSIUM 100 MG/1
1 TABLET, FILM COATED ORAL
Qty: 0 | Refills: 0 | COMMUNITY

## 2018-03-13 RX ORDER — ONDANSETRON 8 MG/1
4 TABLET, FILM COATED ORAL ONCE
Qty: 0 | Refills: 0 | Status: COMPLETED | OUTPATIENT
Start: 2018-03-13 | End: 2018-03-13

## 2018-03-13 RX ORDER — ACETAMINOPHEN 500 MG
650 TABLET ORAL EVERY 6 HOURS
Qty: 0 | Refills: 0 | Status: DISCONTINUED | OUTPATIENT
Start: 2018-03-13 | End: 2018-03-28

## 2018-03-13 RX ORDER — ONDANSETRON 8 MG/1
4 TABLET, FILM COATED ORAL EVERY 6 HOURS
Qty: 0 | Refills: 0 | Status: DISCONTINUED | OUTPATIENT
Start: 2018-03-13 | End: 2018-03-28

## 2018-03-13 RX ORDER — DEXTROSE 50 % IN WATER 50 %
12.5 SYRINGE (ML) INTRAVENOUS ONCE
Qty: 0 | Refills: 0 | Status: DISCONTINUED | OUTPATIENT
Start: 2018-03-13 | End: 2018-03-28

## 2018-03-13 RX ORDER — AZITHROMYCIN 500 MG/1
500 TABLET, FILM COATED ORAL ONCE
Qty: 0 | Refills: 0 | Status: DISCONTINUED | OUTPATIENT
Start: 2018-03-13 | End: 2018-03-14

## 2018-03-13 RX ORDER — CEFTRIAXONE 500 MG/1
1 INJECTION, POWDER, FOR SOLUTION INTRAMUSCULAR; INTRAVENOUS ONCE
Qty: 0 | Refills: 0 | Status: COMPLETED | OUTPATIENT
Start: 2018-03-13 | End: 2018-03-13

## 2018-03-13 RX ADMIN — SODIUM CHLORIDE 3 MILLILITER(S): 9 INJECTION INTRAMUSCULAR; INTRAVENOUS; SUBCUTANEOUS at 20:50

## 2018-03-13 RX ADMIN — CEFTRIAXONE 100 GRAM(S): 500 INJECTION, POWDER, FOR SOLUTION INTRAMUSCULAR; INTRAVENOUS at 23:08

## 2018-03-13 RX ADMIN — FAMOTIDINE 20 MILLIGRAM(S): 10 INJECTION INTRAVENOUS at 20:51

## 2018-03-13 RX ADMIN — ONDANSETRON 4 MILLIGRAM(S): 8 TABLET, FILM COATED ORAL at 20:51

## 2018-03-13 NOTE — H&P ADULT - PROBLEM SELECTOR PLAN 3
Postprandial nausea/vomiting with scant hematemesis  - Postprandial nausea/vomiting with scant hematemesis  -Advance diet as tolerated  -IV fluids for dehydration  -zofran Q6 PRN nausea/vomiting  -c/t monitor vitals

## 2018-03-13 NOTE — H&P ADULT - PROBLEM SELECTOR PLAN 2
likely pleuritic  Will hold cardiac workup as recent outpatient stress test with Dr. Ortega's group in January  Troponemia likely due to renal disease  Will f/up repeat troponin Tylenol PRN pain Pleuritic chest pain worsened after vomiting, reproducible to palpation on exam likely component of costochondritis in the setting of possible pneumonia  -Tylenol PRN pain  -EKG NSR with nonspecific T wave inversion  -no tachycardia at this time  -recent outpatient stress test WNL in January 2018  -f/up cardio consult in AM Pleuritic chest pain worsened after vomiting, reproducible to palpation on exam likely component of costochondritis in the setting of possible pneumonia  -Tylenol PRN pain  -EKG NSR with nonspecific T wave inversion  -troponin 0.16 x 1, will f/up repeat at 3am  -no tachycardia at this time  -recent outpatient stress test WNL in January 2018  -f/up cardio consult in AM

## 2018-03-13 NOTE — H&P ADULT - PROBLEM SELECTOR PLAN 8
Controlled on medication outpatient  -losartan 100 QD outpatient, will hold for now due to acute renal insufficiency  -will c/t monitor overnight and consider adding PO regimen of CCB if needed

## 2018-03-13 NOTE — ED ADULT TRIAGE NOTE - CHIEF COMPLAINT QUOTE
pt states she has had pressure to her chest x 3 week, pain to her neck and shoulders. pt states her face is swollen x 2 weeks and she has had abdominal pain and vomiting x 1 week. hx of diabetes blood glucose 25mg/dl this morning

## 2018-03-13 NOTE — H&P ADULT - ASSESSMENT
56 yo F with PMH s/f HTN/HLD, DMII, hyperkalemia who presents with 3 weeks of postprandial vomiting and stomach pain, symptomatic anemia and concern for GI bleed with recent ibuprofen use, with tactile fever and productive cough suggestive of pneumonia, found to have Right lung infiltrate on CXR, with acute on chronic renal insufficiency and dehydration, currently vitally stable    Admit to: inpatient level of care  Service: medicine resident  Attending: Dr. Bartholomew/Dr. Agee  Bed type: Monitored +

## 2018-03-13 NOTE — ED PROVIDER NOTE - CRITICAL CARE PROVIDED
additional history taking/consultation with other physicians/conducted a detailed discussion of DNR status/direct patient care (not related to procedure)/documentation/interpretation of diagnostic studies

## 2018-03-13 NOTE — H&P ADULT - PROBLEM SELECTOR PLAN 6
Troponemia likely in the setting of CKD  -will f/up repeat troponin at 3am. Troponemia likely in the setting of CKD  -troponin 0.16 at 2100  -will f/up repeat troponin at 3am as above Troponemia likely in the setting of CKD and demand ischemia due to anemia  -troponin 0.16 at 2100  -will f/up repeat troponin at 3am as above

## 2018-03-13 NOTE — H&P ADULT - PROBLEM SELECTOR PLAN 10
Will hold off lovenox for now  SCDs while in bed  Ambulate with assistance  11) HLD  -c/t home med of simvastatin 40 QD Will hold off lovenox for now  SCDs while in bed  Ambulate with assistance

## 2018-03-13 NOTE — ED PROVIDER NOTE - PROGRESS NOTE DETAILS
Pt noted with mild hypoxia although not complaining of SOB at this time. will evaluate for cause. Patient stable. She agrees with blood transfusion but request her daughter sign paperwork. Labs and CXR are as noted. Patient with worsening renal function with acute pna and mild hypoxia.

## 2018-03-13 NOTE — H&P ADULT - NSHPLABSRESULTS_GEN_ALL_CORE
7.0    7.9   )-----------( 505      ( 13 Mar 2018 21:05 )             21.1   03-13    129<L>  |  92<L>  |  69.0<H>  ----------------------------<  235<H>  4.8   |  21.0<L>  |  5.79<H>    Ca    8.8      13 Mar 2018 21:05    TPro  6.5<L>  /  Alb  2.8<L>  /  TBili  <0.2<L>  /  DBili  x   /  AST  12  /  ALT  8   /  AlkPhos  93  03-13      < from: Xray Chest 2 Views PA/Lat (03.13.18 @ 20:43) >      Impression:  Right lunginfiltrate. Bibasilar atelectasis. Right pleural effusion..    < end of copied text >    EKG: nonspecific T wave inversion, troponin 0.16

## 2018-03-13 NOTE — H&P ADULT - PROBLEM SELECTOR PLAN 4
Acute lung infiltrate on CT with recent onset productive cough with white phlegm, tactile fever/chills concerning for CAP  -afebrile, no leukocytosis at this time  -f/up blood cultures x 2  -f/up sputum culture Acute lung infiltrate on CT with recent onset productive cough with white phlegm, tactile fever/chills concerning for CAP  -afebrile, no leukocytosis at this time  -f/up blood cultures x 2  -f/up sputum culture  -tylenol PRN fever, afebrile at this time Acute lung infiltrate on CT with recent onset productive cough with white phlegm, tactile fever/chills concerning for CAP vs pulmonary edema  -2+ pitting edema on exam, no known history of CHF  -afebrile, no leukocytosis at this time  -f/up blood cultures x 2  -f/up sputum culture  -tylenol PRN fever, afebrile at this time

## 2018-03-13 NOTE — H&P ADULT - PROBLEM SELECTOR PLAN 7
Chronic DMII controlled Janumet  BID  -low dose HSS while inpatient, will adjust as needed  -0.3 x 68kg , 20 units likely needed based on weight Chronic DMII controlled Janumet  BID-held due to MARSHALL  -low dose HSS while inpatient, will adjust as needed  -0.3 x 68kg , 20 units likely needed based on weight

## 2018-03-13 NOTE — H&P ADULT - NSHPPHYSICALEXAM_GEN_ALL_CORE
Vital Signs Last 24 Hrs  T(C): 37 (13 Mar 2018 23:05), Max: 37 (13 Mar 2018 23:05)  T(F): 98.6 (13 Mar 2018 23:05), Max: 98.6 (13 Mar 2018 23:05)  HR: 82 (13 Mar 2018 23:05) (82 - 90)  BP: 165/83 (13 Mar 2018 23:05) (143/83 - 165/83)  BP(mean): --  RR: 18 (13 Mar 2018 23:05) (18 - 18)  SpO2: 98% (13 Mar 2018 19:25) (98% - 98%)    PHYSICAL EXAM:      Constitutional: NAD, laying in bed, pale-appearing  HEENT: PERRLA, EOMI ; conjunctival pallor  Neck: No JVD, supple  Back: Normal spine flexure, No CVA tenderness  Respiratory: +inspiratory and expiratory crackles in lower lobes b/l, R>L, decreased breath sounds at bases ; no wheezing, rhonchi  Cardiovascular: S1 and S2, RRR, no m/r/g  Gastrointestinal: BS+ normoactive, soft, ND, NTTP   Extremities: +2 pitting edema b/l up to knees  Vascular: 2+ peripheral pulses; cap refill >2 sec, pallor to extremities and nailbeds  Neurological: AAO x 3, no focal deficits  Psychiatric: Normal mood, normal affect  Musculoskeletal: 5/5 strength b/l upper and lower extremities  Skin: No rashes, no diaphoresis Vital Signs Last 24 Hrs  T(C): 37 (13 Mar 2018 23:05), Max: 37 (13 Mar 2018 23:05)  T(F): 98.6 (13 Mar 2018 23:05), Max: 98.6 (13 Mar 2018 23:05)  HR: 82 (13 Mar 2018 23:05) (82 - 90)  BP: 165/83 (13 Mar 2018 23:05) (143/83 - 165/83)  BP(mean): --  RR: 18 (13 Mar 2018 23:05) (18 - 18)  SpO2: 98% (13 Mar 2018 19:25) (98% - 98%)    PHYSICAL EXAM:      Constitutional: NAD, laying in bed, pale-appearing  HEENT: PERRLA, EOMI ; conjunctival pallor  Neck: No JVD, supple  Back: Normal spine flexure, No CVA tenderness  Respiratory: +inspiratory and expiratory crackles in lower lobes b/l, R>L, decreased breath sounds at bases ; no wheezing, rhonchi  Cardiovascular: S1 and S2, RRR, no m/r/g; Chest pain reproducible to palpation across sternum in mid-sternal region extending to left parasternal region, 2nd -4th costal cartilages  Gastrointestinal: BS+ normoactive, soft, ND, NTTP   Extremities: +2 pitting edema b/l up to knees  Vascular: 2+ peripheral pulses; cap refill >2 sec, pallor to extremities and nailbeds  Neurological: AAO x 3, no focal deficits  Psychiatric: Normal mood, normal affect  Musculoskeletal: 5/5 strength b/l upper and lower extremities  Skin: No rashes, no diaphoresis

## 2018-03-13 NOTE — H&P ADULT - PROBLEM SELECTOR PLAN 5
with worsening progression of chronic kidney disease  -prerenal azotemia likely 2/2 dehydration with nausea/vomiting for 3 weeks  -BUN/Cr 69/5.79 worsened from baseline in Feb 2017 of 23/1.3   -f/up repeat UA  -NS @ 100cc/hr  -strict I/O Q 6 hours with worsening progression of chronic kidney disease  -prerenal azotemia likely 2/2 dehydration with nausea/vomiting for 3 weeks  -BUN/Cr 69/5.79 worsened from baseline in Feb 2017 of 23/1.3   -f/up repeat UA  -NS @ 100cc/hr  -strict I/O Q 6 hours  -f/up renal consult (Dr. Kevin) with worsening progression of chronic kidney disease  -prerenal azotemia likely 2/2 dehydration with nausea/vomiting for 3 weeks  -BUN/Cr 69/5.79 worsened from baseline in Feb 2017 of 23/1.3   -f/up repeat UA  -strict I/O Q 6 hours  -f/up renal consult (Dr. Kevin) with worsening progression of chronic kidney disease stage V   -prerenal azotemia likely 2/2 dehydration with nausea/vomiting for 3 weeks  -BUN/Cr 69/5.79 worsened from baseline in Feb 2017 of 23/1.3   -f/up repeat UA  -strict I/O Q 6 hours  -f/up renal consult (Dr. Kevin)

## 2018-03-13 NOTE — H&P ADULT - PROBLEM SELECTOR PLAN 1
due to worsening acute on chronic CKD vs suspected GI bleed  -H/H 7.0/21, with prior H/H on february 15th 10/29.9 at baseline   -receiving 2 units pRBCs  -repeat h/h microtubules at midnight  -AM CBC, CMP, Mg, Phos, Pt/INR and PTT due to worsening acute on chronic renal insuffieciency in the setting of CKD vs suspected GI bleed due to worsening acute on chronic renal insuffieciency in the setting of CKD vs suspected GI bleed  -receiving 2 units pRBC  -f/up H/H repeat at midnight  -last colonoscopy /EGD 2017 WNL as per patient  -f/up consult with Dr. Patton GI  -f/up AM CBC, CMP, Mag , Phos  -monitored bed +    -f/up stool guaiac  -serum ferritin 183,l due to worsening acute on chronic renal insuffieciency in the setting of CKD vs suspected GI bleed  -visible pallor and decreased cap refill on exam  -receiving 2 units pRBC  -f/up H/H repeat at midnight  -last colonoscopy /EGD 2017 WNL as per patient  -f/up consult with Dr. Patton GI  -f/up AM CBC, CMP, Mag , Phos  -monitored bed +    -f/up stool guaiac  -serum ferritin 183, transferrin low 179 likely 2/2 anemia of chronic disease with component of iron deficiency with total iron low at 18, possible malnutrition component  -Protonix 40 IV daily for GI prophylaxis  -fall risk protocol, ambulate with assistance due to worsening acute on chronic renal insuffieciency in the setting of CKD vs suspected GI bleed  -visible pallor and decreased cap refill on exam  -receiving 2 units pRBC  -last colonoscopy /EGD 2017 WNL as per patient  -f/up consult with Dr. Patton GI  -f/up AM CBC, CMP, Mag , Phos  -monitored bed +    -f/up stool guaiac  -serum ferritin 183, transferrin low 179 likely 2/2 anemia of chronic disease with component of iron deficiency with total iron low at 18, possible malnutrition component  -Protonix 40 IV daily for GI prophylaxis  -fall risk protocol, ambulate with assistance

## 2018-03-13 NOTE — ED PROVIDER NOTE - OBJECTIVE STATEMENT
This is a 58 y/o F PMHx DM, HLD and HTN presents to ED c/o dizziness, CP, neck pain, nausea and vomiting x 3 weeks. Pt reports she is unable to tolerate solids and liquids secondary to nausea and vomiting. Pain is described as constant pinching and has no alleviating factors. Daughter at bedside reports last year in February pt was admitted in a hospital for one week due to similar symptoms, no cause was found. Pt went to her PCP where she was referred to a hematologist and was not placed on nausea medications. Pt had a normal stress test this year. NKDA. Denies diarrhea, fever, chills, SOB, difficulty breathing, HA, numbness, tingling and abd pain.   PCP: Dr. Arleth Lopez This is a 58 y/o F PMHx DM, HLD and HTN presents to ED c/o dizziness, CP, neck pain, nausea and vomiting x 3 weeks. Pt reports she is unable to tolerate solids and liquids secondary to nausea and vomiting. Pain is described as constant pinching and has no alleviating factors. Daughter at bedside reports last year in February pt was admitted in a hospital for one week due to similar symptoms, no cause was found. Pt went to her PCP where she was referred to a hematologist and was not placed on nausea medications. Pt had a normal stress test this year. NKDA. Denies diarrhea, fever, chills, SOB, difficulty breathing, HA, numbness, tingling and abd pain.   PCP: Dr. Arleth Lopez  : Tamiko

## 2018-03-13 NOTE — H&P ADULT - HISTORY OF PRESENT ILLNESS
Ms. Elizabeth is a 58 yo F with PMH s/f HTN/HLD, DMII, hyperkalemia who presents with 3 weeks of postprandial vomiting and stomach pain. Patient states that for the past three weeks, she has had nausea with postprandial emesis of food contents.  Today she vomited twice, one time which had an isolated episode of scant bright red blood in the vomitus. After vomiting she feels lightheadedness/weakness without vertigo. Associated with the nausea she has had a pleuritic chest pain, 10/10 in severity, intermittent, which arises after she vomits, worsens with leaning forward, alleviated with supine position. For the pain she has taken 600mg motrin once a day, but due to stomach upset from the Motrin also took Tylenol for pain.     Patient states bowel movements have been dark brown.   Patient also has had a tactile fever with chills, productive cough with white sputum. Denies sick contacts/recent travel. No dysuria, hematuria, hematochezia, melena.   Of note, patient had recent negative stress test in January .

## 2018-03-14 DIAGNOSIS — E11.65 TYPE 2 DIABETES MELLITUS WITH HYPERGLYCEMIA: ICD-10-CM

## 2018-03-14 DIAGNOSIS — N28.9 DISORDER OF KIDNEY AND URETER, UNSPECIFIED: ICD-10-CM

## 2018-03-14 DIAGNOSIS — D64.9 ANEMIA, UNSPECIFIED: ICD-10-CM

## 2018-03-14 DIAGNOSIS — R07.9 CHEST PAIN, UNSPECIFIED: ICD-10-CM

## 2018-03-14 DIAGNOSIS — Z86.39 PERSONAL HISTORY OF OTHER ENDOCRINE, NUTRITIONAL AND METABOLIC DISEASE: ICD-10-CM

## 2018-03-14 DIAGNOSIS — E87.70 FLUID OVERLOAD, UNSPECIFIED: ICD-10-CM

## 2018-03-14 DIAGNOSIS — Z29.9 ENCOUNTER FOR PROPHYLACTIC MEASURES, UNSPECIFIED: ICD-10-CM

## 2018-03-14 DIAGNOSIS — I10 ESSENTIAL (PRIMARY) HYPERTENSION: ICD-10-CM

## 2018-03-14 DIAGNOSIS — E11.9 TYPE 2 DIABETES MELLITUS WITHOUT COMPLICATIONS: ICD-10-CM

## 2018-03-14 DIAGNOSIS — R74.8 ABNORMAL LEVELS OF OTHER SERUM ENZYMES: ICD-10-CM

## 2018-03-14 DIAGNOSIS — R11.2 NAUSEA WITH VOMITING, UNSPECIFIED: ICD-10-CM

## 2018-03-14 DIAGNOSIS — R07.1 CHEST PAIN ON BREATHING: ICD-10-CM

## 2018-03-14 DIAGNOSIS — R69 ILLNESS, UNSPECIFIED: ICD-10-CM

## 2018-03-14 DIAGNOSIS — R91.8 OTHER NONSPECIFIC ABNORMAL FINDING OF LUNG FIELD: ICD-10-CM

## 2018-03-14 DIAGNOSIS — I16.0 HYPERTENSIVE URGENCY: ICD-10-CM

## 2018-03-14 LAB
ALBUMIN SERPL ELPH-MCNC: 2.7 G/DL — LOW (ref 3.3–5.2)
ALP SERPL-CCNC: 95 U/L — SIGNIFICANT CHANGE UP (ref 40–120)
ALT FLD-CCNC: 8 U/L — SIGNIFICANT CHANGE UP
ANION GAP SERPL CALC-SCNC: 16 MMOL/L — SIGNIFICANT CHANGE UP (ref 5–17)
ANION GAP SERPL CALC-SCNC: 16 MMOL/L — SIGNIFICANT CHANGE UP (ref 5–17)
APPEARANCE UR: CLEAR — SIGNIFICANT CHANGE UP
APTT BLD: 29 SEC — SIGNIFICANT CHANGE UP (ref 27.5–37.4)
AST SERPL-CCNC: 12 U/L — SIGNIFICANT CHANGE UP
BASOPHILS # BLD AUTO: 0 K/UL — SIGNIFICANT CHANGE UP (ref 0–0.2)
BASOPHILS NFR BLD AUTO: 0.1 % — SIGNIFICANT CHANGE UP (ref 0–2)
BILIRUB SERPL-MCNC: 0.4 MG/DL — SIGNIFICANT CHANGE UP (ref 0.4–2)
BILIRUB UR-MCNC: NEGATIVE — SIGNIFICANT CHANGE UP
BUN SERPL-MCNC: 68 MG/DL — HIGH (ref 8–20)
BUN SERPL-MCNC: 69 MG/DL — HIGH (ref 8–20)
CALCIUM SERPL-MCNC: 8.8 MG/DL — SIGNIFICANT CHANGE UP (ref 8.6–10.2)
CALCIUM SERPL-MCNC: 9 MG/DL — SIGNIFICANT CHANGE UP (ref 8.6–10.2)
CHLORIDE SERPL-SCNC: 92 MMOL/L — LOW (ref 98–107)
CHLORIDE SERPL-SCNC: 92 MMOL/L — LOW (ref 98–107)
CHOLEST SERPL-MCNC: 214 MG/DL — HIGH (ref 110–199)
CO2 SERPL-SCNC: 21 MMOL/L — LOW (ref 22–29)
CO2 SERPL-SCNC: 21 MMOL/L — LOW (ref 22–29)
COLOR SPEC: YELLOW — SIGNIFICANT CHANGE UP
CREAT SERPL-MCNC: 5.52 MG/DL — HIGH (ref 0.5–1.3)
CREAT SERPL-MCNC: 5.57 MG/DL — HIGH (ref 0.5–1.3)
CRP SERPL-MCNC: 7.7 MG/DL — HIGH (ref 0–0.4)
DIFF PNL FLD: ABNORMAL
EOSINOPHIL # BLD AUTO: 0.1 K/UL — SIGNIFICANT CHANGE UP (ref 0–0.5)
EOSINOPHIL NFR BLD AUTO: 0.7 % — SIGNIFICANT CHANGE UP (ref 0–6)
EPI CELLS # UR: SIGNIFICANT CHANGE UP
ERYTHROCYTE [SEDIMENTATION RATE] IN BLOOD: 60 MM/HR — HIGH (ref 0–20)
GAS PNL BLDA: SIGNIFICANT CHANGE UP
GLUCOSE BLDC GLUCOMTR-MCNC: 101 MG/DL — HIGH (ref 70–99)
GLUCOSE BLDC GLUCOMTR-MCNC: 165 MG/DL — HIGH (ref 70–99)
GLUCOSE BLDC GLUCOMTR-MCNC: 195 MG/DL — HIGH (ref 70–99)
GLUCOSE BLDC GLUCOMTR-MCNC: 223 MG/DL — HIGH (ref 70–99)
GLUCOSE SERPL-MCNC: 198 MG/DL — HIGH (ref 70–115)
GLUCOSE SERPL-MCNC: 212 MG/DL — HIGH (ref 70–115)
GLUCOSE UR QL: 100 MG/DL
HBA1C BLD-MCNC: 7.5 % — HIGH (ref 4–5.6)
HCT VFR BLD CALC: 26.3 % — LOW (ref 37–47)
HDLC SERPL-MCNC: 63 MG/DL — LOW
HGB BLD-MCNC: 8.7 G/DL — LOW (ref 12–16)
INR BLD: 1 RATIO — SIGNIFICANT CHANGE UP (ref 0.88–1.16)
KETONES UR-MCNC: NEGATIVE — SIGNIFICANT CHANGE UP
LEUKOCYTE ESTERASE UR-ACNC: NEGATIVE — SIGNIFICANT CHANGE UP
LIPID PNL WITH DIRECT LDL SERPL: 128 MG/DL — SIGNIFICANT CHANGE UP
LYMPHOCYTES # BLD AUTO: 0.6 K/UL — LOW (ref 1–4.8)
LYMPHOCYTES # BLD AUTO: 7.1 % — LOW (ref 20–55)
MAGNESIUM SERPL-MCNC: 2.1 MG/DL — SIGNIFICANT CHANGE UP (ref 1.6–2.6)
MCHC RBC-ENTMCNC: 27.9 PG — SIGNIFICANT CHANGE UP (ref 27–31)
MCHC RBC-ENTMCNC: 33.1 G/DL — SIGNIFICANT CHANGE UP (ref 32–36)
MCV RBC AUTO: 84.3 FL — SIGNIFICANT CHANGE UP (ref 81–99)
MONOCYTES # BLD AUTO: 0.7 K/UL — SIGNIFICANT CHANGE UP (ref 0–0.8)
MONOCYTES NFR BLD AUTO: 8.9 % — SIGNIFICANT CHANGE UP (ref 3–10)
NEUTROPHILS # BLD AUTO: 6.6 K/UL — SIGNIFICANT CHANGE UP (ref 1.8–8)
NEUTROPHILS NFR BLD AUTO: 82.8 % — HIGH (ref 37–73)
NITRITE UR-MCNC: NEGATIVE — SIGNIFICANT CHANGE UP
NT-PROBNP SERPL-SCNC: HIGH PG/ML (ref 0–300)
PH UR: 7 — SIGNIFICANT CHANGE UP (ref 5–8)
PHOSPHATE SERPL-MCNC: 5.3 MG/DL — HIGH (ref 2.4–4.7)
PLATELET # BLD AUTO: 480 K/UL — HIGH (ref 150–400)
POTASSIUM SERPL-MCNC: 4.8 MMOL/L — SIGNIFICANT CHANGE UP (ref 3.5–5.3)
POTASSIUM SERPL-MCNC: 4.9 MMOL/L — SIGNIFICANT CHANGE UP (ref 3.5–5.3)
POTASSIUM SERPL-SCNC: 4.8 MMOL/L — SIGNIFICANT CHANGE UP (ref 3.5–5.3)
POTASSIUM SERPL-SCNC: 4.9 MMOL/L — SIGNIFICANT CHANGE UP (ref 3.5–5.3)
PROT SERPL-MCNC: 6.6 G/DL — SIGNIFICANT CHANGE UP (ref 6.6–8.7)
PROT UR-MCNC: 500 MG/DL
PROTHROM AB SERPL-ACNC: 11 SEC — SIGNIFICANT CHANGE UP (ref 9.8–12.7)
RAPID RVP RESULT: SIGNIFICANT CHANGE UP
RBC # BLD: 3.12 M/UL — LOW (ref 4.4–5.2)
RBC # FLD: 12.8 % — SIGNIFICANT CHANGE UP (ref 11–15.6)
RBC CASTS # UR COMP ASSIST: ABNORMAL /HPF (ref 0–4)
SODIUM SERPL-SCNC: 129 MMOL/L — LOW (ref 135–145)
SODIUM SERPL-SCNC: 129 MMOL/L — LOW (ref 135–145)
SP GR SPEC: 1.01 — SIGNIFICANT CHANGE UP (ref 1.01–1.02)
T3 SERPL-MCNC: 61 NG/DL — LOW (ref 80–200)
T4 AB SER-ACNC: 6 UG/DL — SIGNIFICANT CHANGE UP (ref 4.5–12)
TOTAL CHOLESTEROL/HDL RATIO MEASUREMENT: 3 RATIO — LOW (ref 3.3–7.1)
TRIGL SERPL-MCNC: 114 MG/DL — SIGNIFICANT CHANGE UP (ref 10–200)
TROPONIN T SERPL-MCNC: 0.14 NG/ML — HIGH (ref 0–0.06)
TROPONIN T SERPL-MCNC: 0.17 NG/ML — HIGH (ref 0–0.06)
TSH SERPL-MCNC: 6.95 UIU/ML — HIGH (ref 0.27–4.2)
UROBILINOGEN FLD QL: NEGATIVE MG/DL — SIGNIFICANT CHANGE UP
WBC # BLD: 8 K/UL — SIGNIFICANT CHANGE UP (ref 4.8–10.8)
WBC # FLD AUTO: 8 K/UL — SIGNIFICANT CHANGE UP (ref 4.8–10.8)
WBC UR QL: NEGATIVE — SIGNIFICANT CHANGE UP

## 2018-03-14 PROCEDURE — 74176 CT ABD & PELVIS W/O CONTRAST: CPT | Mod: 26

## 2018-03-14 PROCEDURE — 71250 CT THORAX DX C-: CPT | Mod: 26

## 2018-03-14 PROCEDURE — 76775 US EXAM ABDO BACK WALL LIM: CPT | Mod: 26

## 2018-03-14 PROCEDURE — 99254 IP/OBS CNSLTJ NEW/EST MOD 60: CPT

## 2018-03-14 PROCEDURE — 99233 SBSQ HOSP IP/OBS HIGH 50: CPT | Mod: GC

## 2018-03-14 PROCEDURE — 99223 1ST HOSP IP/OBS HIGH 75: CPT

## 2018-03-14 RX ORDER — AZITHROMYCIN 500 MG/1
TABLET, FILM COATED ORAL
Qty: 0 | Refills: 0 | Status: DISCONTINUED | OUTPATIENT
Start: 2018-03-14 | End: 2018-03-14

## 2018-03-14 RX ORDER — HYDRALAZINE HCL 50 MG
25 TABLET ORAL EVERY 8 HOURS
Qty: 0 | Refills: 0 | Status: DISCONTINUED | OUTPATIENT
Start: 2018-03-14 | End: 2018-03-26

## 2018-03-14 RX ORDER — FUROSEMIDE 40 MG
40 TABLET ORAL
Qty: 0 | Refills: 0 | Status: DISCONTINUED | OUTPATIENT
Start: 2018-03-14 | End: 2018-03-15

## 2018-03-14 RX ORDER — INSULIN LISPRO 100/ML
VIAL (ML) SUBCUTANEOUS AT BEDTIME
Qty: 0 | Refills: 0 | Status: DISCONTINUED | OUTPATIENT
Start: 2018-03-14 | End: 2018-03-28

## 2018-03-14 RX ORDER — HYDRALAZINE HCL 50 MG
10 TABLET ORAL ONCE
Qty: 0 | Refills: 0 | Status: COMPLETED | OUTPATIENT
Start: 2018-03-14 | End: 2018-03-14

## 2018-03-14 RX ORDER — INSULIN LISPRO 100/ML
VIAL (ML) SUBCUTANEOUS
Qty: 0 | Refills: 0 | Status: DISCONTINUED | OUTPATIENT
Start: 2018-03-14 | End: 2018-03-28

## 2018-03-14 RX ORDER — HYDRALAZINE HCL 50 MG
10 TABLET ORAL EVERY 8 HOURS
Qty: 0 | Refills: 0 | Status: DISCONTINUED | OUTPATIENT
Start: 2018-03-14 | End: 2018-03-14

## 2018-03-14 RX ORDER — SODIUM CHLORIDE 9 MG/ML
1000 INJECTION INTRAMUSCULAR; INTRAVENOUS; SUBCUTANEOUS
Qty: 0 | Refills: 0 | Status: DISCONTINUED | OUTPATIENT
Start: 2018-03-14 | End: 2018-03-14

## 2018-03-14 RX ORDER — CEFTRIAXONE 500 MG/1
1 INJECTION, POWDER, FOR SOLUTION INTRAMUSCULAR; INTRAVENOUS EVERY 24 HOURS
Qty: 0 | Refills: 0 | Status: DISCONTINUED | OUTPATIENT
Start: 2018-03-14 | End: 2018-03-14

## 2018-03-14 RX ORDER — PANTOPRAZOLE SODIUM 20 MG/1
40 TABLET, DELAYED RELEASE ORAL
Qty: 0 | Refills: 0 | Status: DISCONTINUED | OUTPATIENT
Start: 2018-03-14 | End: 2018-03-28

## 2018-03-14 RX ORDER — AZITHROMYCIN 500 MG/1
500 TABLET, FILM COATED ORAL ONCE
Qty: 0 | Refills: 0 | Status: COMPLETED | OUTPATIENT
Start: 2018-03-14 | End: 2018-03-14

## 2018-03-14 RX ORDER — FUROSEMIDE 40 MG
40 TABLET ORAL ONCE
Qty: 0 | Refills: 0 | Status: COMPLETED | OUTPATIENT
Start: 2018-03-14 | End: 2018-03-14

## 2018-03-14 RX ORDER — IRON SUCROSE 20 MG/ML
200 INJECTION, SOLUTION INTRAVENOUS DAILY
Qty: 0 | Refills: 0 | Status: COMPLETED | OUTPATIENT
Start: 2018-03-14 | End: 2018-03-18

## 2018-03-14 RX ADMIN — Medication 1 DROP(S): at 17:03

## 2018-03-14 RX ADMIN — Medication 40 MILLIGRAM(S): at 02:17

## 2018-03-14 RX ADMIN — Medication 10 MILLIGRAM(S): at 03:21

## 2018-03-14 RX ADMIN — Medication 25 MILLIGRAM(S): at 11:33

## 2018-03-14 RX ADMIN — PANTOPRAZOLE SODIUM 40 MILLIGRAM(S): 20 TABLET, DELAYED RELEASE ORAL at 11:33

## 2018-03-14 RX ADMIN — Medication 1 DROP(S): at 05:20

## 2018-03-14 RX ADMIN — Medication 25 MILLIGRAM(S): at 21:23

## 2018-03-14 RX ADMIN — ONDANSETRON 4 MILLIGRAM(S): 8 TABLET, FILM COATED ORAL at 07:18

## 2018-03-14 RX ADMIN — AZITHROMYCIN 255 MILLIGRAM(S): 500 TABLET, FILM COATED ORAL at 05:15

## 2018-03-14 RX ADMIN — Medication 0.1 MILLIGRAM(S): at 17:03

## 2018-03-14 RX ADMIN — IRON SUCROSE 220 MILLIGRAM(S): 20 INJECTION, SOLUTION INTRAVENOUS at 11:32

## 2018-03-14 RX ADMIN — ONDANSETRON 4 MILLIGRAM(S): 8 TABLET, FILM COATED ORAL at 21:23

## 2018-03-14 RX ADMIN — Medication 2: at 12:31

## 2018-03-14 RX ADMIN — Medication 40 MILLIGRAM(S): at 17:03

## 2018-03-14 RX ADMIN — Medication 1: at 17:06

## 2018-03-14 RX ADMIN — Medication 10 MILLIGRAM(S): at 05:49

## 2018-03-14 RX ADMIN — SIMVASTATIN 40 MILLIGRAM(S): 20 TABLET, FILM COATED ORAL at 22:55

## 2018-03-14 NOTE — PROGRESS NOTE ADULT - PROBLEM SELECTOR PLAN 4
Acute lung infiltrate on CT with recent onset productive cough with white phlegm, tactile fever/chills concerning for CAP vs pulmonary edema  -2+ pitting edema on exam, no known history of CHF  -afebrile, no leukocytosis at this time  -f/up blood cultures x 2  -f/up sputum culture  -tylenol PRN fever, afebrile at this time Postprandial nausea/vomiting with scant hematemesis  -Advance diet as tolerated  -IV fluids for dehydration  -zofran Q6 PRN nausea/vomiting  -c/t monitor vitals Bilateral pleural effusions and pericardial effusion, noted tte with preserved ef less likely HF related concern for fluid status being secondary to ARF   -c/w diuresis with lasix 40 mg IV BID   -tte shows preserved EF and moderate pericardiac effusion unclear if this is related to pericarditis   -c/w supplemental o2 abg noted with low pao2 will increase oxygen supply from NC to venti mask Pleuritic and positional chest pain, with moderate pleural effusion, elevated troponins  -Tylenol PRN pain  -EKG NSR with nonspecific T wave inversion  -will trend serial troponin could also be elevated due to ARF   -recent outpatient stress test WNL in January 2018  -no asa given current concern for bleeding   -Cardio consult noted and appreciated and d/w Dr. Salazar the plan of care. Could be secondary to epigastric pain from vomiting/ pericarditis will hold off on tx given ARF

## 2018-03-14 NOTE — PROGRESS NOTE ADULT - PROBLEM SELECTOR PLAN 8
Controlled on medication outpatient  -losartan 100 QD outpatient, will hold for now due to acute renal insufficiency  -will c/t monitor overnight and consider adding PO regimen of CCB if needed Chronic DMII controlled Janumet  BID-held due to MARSHALL  -low dose HSS while inpatient, will adjust as needed  -0.3 x 68kg , 20 units likely needed based on weight No chemical ac given severe anemia   SCDs while in bed  Ambulate with assistance

## 2018-03-14 NOTE — CONSULT NOTE ADULT - ASSESSMENT
Patient with possible pneumonia, abdominal pain, nausea and vomiting and acute kidney injury. She has acute anemia and has iron deficiency. She is not a good historian and mentioned that she might have a colonoscopy performed in the past.    1. At this time, due to cardiopulmonary disease and absence of any overt GI bleeding, I will recommend no endoscopic work up  2. Supplement iron  3. PPI bid   4. Monitor CBC  5. Cardiology and pulmonary evaluation

## 2018-03-14 NOTE — CONSULT NOTE ADULT - SUBJECTIVE AND OBJECTIVE BOX
Patient is a 57y old  Female who presents with a chief complaint of dark stools (14 Mar 2018 02:54)   Acute  renal failure.    HPI:  Ms. Elizabeth is a 58 yo F with PMH s/f HTN/HLD, DMII, hyperkalemia who presents with 3 weeks of postprandial vomiting and stomach pain. Patient states that for the past three weeks, she has had nausea with postprandial emesis of food contents.  Today she vomited twice, one time which had an isolated episode of scant bright red blood in the vomitus. After vomiting she feels lightheadedness/weakness without vertigo. Associated with the nausea she has had a pleuritic chest pain, 10/10 in severity, intermittent, which arises after she vomits, worsens with leaning forward, alleviated with supine position. For the pain she has taken 600mg motrin once a day, but due to stomach upset from the Motrin also took Tylenol for pain.     Patient states bowel movements have been dark brown.   Patient also has had a tactile fever with chills, productive cough with white sputum. Denies sick contacts/recent travel. No dysuria, hematuria, hematochezia, melena.   Of note, patient had recent negative stress test in January . (13 Mar 2018 22:20)      PAST MEDICAL & SURGICAL HISTORY:  HLD (hyperlipidemia)  HTN (hypertension)  DM (diabetes mellitus)  No significant past surgical history  Tubal ligation    FAMILY HISTORY:  No pertinent family history in first degree relatives  [place]    Social History: Non smoker    MEDICATIONS  (STANDING):  artificial  tears Solution 1 Drop(s) Both EYES every 12 hours  azithromycin  IVPB      cefTRIAXone   IVPB 1 Gram(s) IV Intermittent every 24 hours  cloNIDine 0.1 milliGRAM(s) Oral two times a day  dextrose 5%. 1000 milliLiter(s) (50 mL/Hr) IV Continuous <Continuous>  dextrose 50% Injectable 12.5 Gram(s) IV Push once  dextrose 50% Injectable 25 Gram(s) IV Push once  dextrose 50% Injectable 25 Gram(s) IV Push once  furosemide   Injectable 40 milliGRAM(s) IV Push two times a day  hydrALAZINE 25 milliGRAM(s) Oral every 8 hours  insulin lispro (HumaLOG) corrective regimen sliding scale   SubCutaneous three times a day before meals  insulin lispro (HumaLOG) corrective regimen sliding scale   SubCutaneous at bedtime  iron sucrose IVPB 200 milliGRAM(s) IV Intermittent daily  pantoprazole  Injectable 40 milliGRAM(s) IV Push daily  simvastatin 40 milliGRAM(s) Oral at bedtime    MEDICATIONS  (PRN):  acetaminophen   Tablet 650 milliGRAM(s) Oral every 6 hours PRN For Temp greater than 38 C (100.4 F)  acetaminophen   Tablet. 650 milliGRAM(s) Oral every 6 hours PRN Moderate Pain (4 - 6)  dextrose Gel 1 Dose(s) Oral once PRN Blood Glucose LESS THAN 70 milliGRAM(s)/deciliter  glucagon  Injectable 1 milliGRAM(s) IntraMuscular once PRN Glucose LESS THAN 70 milligrams/deciliter  ondansetron Injectable 4 milliGRAM(s) IV Push every 6 hours PRN Nausea   Meds reviewed    Allergies    No Known Allergies        REVIEW OF SYSTEMS:    CONSTITUTIONAL:  sob, weight gain, feels weak  EYES: No eye pain, visual disturbances, or discharge  ENMT:  No difficulty hearing, tinnitus, vertigo; No sinus or throat pain  NECK: No pain or stiffness  BREASTS: No pain, masses, or nipple discharge  RESPIRATORY: sobe  CARDIOVASCULAR: No chest pain, palpitations, dizziness,   GASTROINTESTINAL: mild epigastric pain with nausea, vomiting, some  blood  GENITOURINARY: No dysuria, frequency, has hx of microscopic hematuria  NEUROLOGICAL: No headaches, memory loss, loss of strength, numbness, or tremors  SKIN: Neg  LYMPH NODES: No enlarged glands  ENDOCRINE: No heat or cold intolerance; No hair loss  MUSCULOSKELETAL: Neg  PSYCHIATRIC: No depression, anxiety, mood swings, or difficulty sleeping  HEME/LYMPH: No easy bruising, or bleeding gums  ALLERY AND IMMUNOLOGIC: No hives or eczema      Vital Signs Last 24 Hrs  T(C): 36.4 (14 Mar 2018 07:19), Max: 37 (13 Mar 2018 23:05)  T(F): 97.6 (14 Mar 2018 07:19), Max: 98.6 (13 Mar 2018 23:05)  HR: 91 (14 Mar 2018 07:19) (77 - 91)  BP: 168/85 (14 Mar 2018 07:19) (143/83 - 184/93)  BP(mean): --  RR: 18 (14 Mar 2018 07:19) (18 - 23)  SpO2: 93% (14 Mar 2018 07:19) (93% - 98%)  Daily Height in cm: 157.48 (13 Mar 2018 19:13)    Daily     PHYSICAL EXAM:    GENERAL: appears chronically ill, oriented.  HEAD:  Atraumatic, Normocephalic  EYES: EOMI, PERRLA, conjunctiva and sclera clear  ENMT: No tonsillar erythema, exudates, or enlargement; Moist mucous membranes  NECK: Supple, neck  veins full  NERVOUS SYSTEM:  Alert & Oriented X3, Good concentration; Motor Strength wnl upper and lower extremities;  CHEST/LUNG: Base rhonchi L>R  HEART: Regular rate and rhythm; No murmurs, rubs, or gallops  ABDOMEN: Soft, mild epigastric tenderness; Bowel sounds present  EXTREMITIES:  No edema  LYMPH: No lymphadenopathy noted  SKIN: No rashes or lesions, pale    no CVA pain    LABS:                        7.0    7.9   )-----------( 505      ( 13 Mar 2018 21:05 )             21.1     03-13    129<L>  |  92<L>  |  69.0<H>  ----------------------------<  235<H>  4.8   |  21.0<L>  |  5.79<H>    Ca    8.8      13 Mar 2018 21:05    TPro  6.5<L>  /  Alb  2.8<L>  /  TBili  <0.2<L>  /  DBili  x   /  AST  12  /  ALT  8   /  AlkPhos  93  03-13      Urinalysis Basic - ( 14 Mar 2018 03:01 )    Color: Yellow / Appearance: Clear / S.010 / pH: x  Gluc: x / Ketone: Negative  / Bili: Negative / Urobili: Negative mg/dL   Blood: x / Protein: 500 mg/dL / Nitrite: Negative   Leuk Esterase: Negative / RBC: 6-10 /HPF / WBC Negative   Sq Epi: x / Non Sq Epi: Few / Bacteria: x              RADIOLOGY & ADDITIONAL TESTS:

## 2018-03-14 NOTE — PROGRESS NOTE ADULT - PROBLEM SELECTOR PLAN 5
with worsening progression of chronic kidney disease stage V   -prerenal azotemia likely 2/2 dehydration with nausea/vomiting for 3 weeks  -BUN/Cr 69/5.79 worsened from baseline in Feb 2017 of 23/1.3   -f/up repeat UA  -strict I/O Q 6 hours  -f/up renal consult (Dr. Kevin) Acute lung infiltrate on CT with recent onset productive cough with white phlegm, tactile fever/chills concerning for CAP vs pulmonary edema  -2+ pitting edema on exam, no known history of CHF  -afebrile, no leukocytosis at this time  -f/up blood cultures x 2  -f/up sputum culture  -tylenol PRN fever, afebrile at this time Postprandial nausea/vomiting with scant hematemesis  -Advance diet as tolerated  -IV fluids for dehydration  -zofran Q6 PRN nausea/vomiting  -c/t monitor vitals

## 2018-03-14 NOTE — CONSULT NOTE ADULT - SUBJECTIVE AND OBJECTIVE BOX
HPI:  Ms. Elizabeth is a 58 yo F with PMH s/f HTN/HLD, DMII, hyperkalemia who presents with 3 weeks of postprandial vomiting and stomach pain. Patient states that for the past three weeks, she has had nausea with postprandial emesis of food contents.  Today she vomited twice, one time which had an isolated episode of scant bright red blood in the vomitus. After vomiting she feels lightheadedness/weakness without vertigo. Associated with the nausea she has had a pleuritic chest pain, 10/10 in severity, intermittent, which arises after she vomits, worsens with leaning forward, alleviated with supine position. For the pain she has taken 600mg motrin once a day, but due to stomach upset from the Motrin also took Tylenol for pain.     Patient states bowel movements have been dark brown.   Patient also has had a tactile fever with chills, productive cough with white sputum. Denies sick contacts/recent travel. No dysuria, hematuria, hematochezia, melena.   Of note, patient had recent negative stress test in January . (13 Mar 2018 22:20)      PAST MEDICAL & SURGICAL HISTORY:  HLD (hyperlipidemia)  HTN (hypertension)  DM (diabetes mellitus)  No significant past surgical history      ROS:  No Heartburn, regurgitation, dysphagia, odynophagia.  No dyspepsia  No abdominal pain.    No Nausea, vomiting.  No Bleeding.  No hematemesis.   No diarrhea.    No hematochesia.  No weight loss, anorexia.  No edema.      MEDICATIONS  (STANDING):  artificial  tears Solution 1 Drop(s) Both EYES every 12 hours  azithromycin  IVPB      cefTRIAXone   IVPB 1 Gram(s) IV Intermittent every 24 hours  dextrose 5%. 1000 milliLiter(s) (50 mL/Hr) IV Continuous <Continuous>  dextrose 50% Injectable 12.5 Gram(s) IV Push once  dextrose 50% Injectable 25 Gram(s) IV Push once  dextrose 50% Injectable 25 Gram(s) IV Push once  hydrALAZINE 10 milliGRAM(s) Oral every 8 hours  insulin lispro (HumaLOG) corrective regimen sliding scale   SubCutaneous three times a day before meals  insulin lispro (HumaLOG) corrective regimen sliding scale   SubCutaneous at bedtime  pantoprazole  Injectable 40 milliGRAM(s) IV Push daily  simvastatin 40 milliGRAM(s) Oral at bedtime    MEDICATIONS  (PRN):  acetaminophen   Tablet 650 milliGRAM(s) Oral every 6 hours PRN For Temp greater than 38 C (100.4 F)  acetaminophen   Tablet. 650 milliGRAM(s) Oral every 6 hours PRN Moderate Pain (4 - 6)  dextrose Gel 1 Dose(s) Oral once PRN Blood Glucose LESS THAN 70 milliGRAM(s)/deciliter  glucagon  Injectable 1 milliGRAM(s) IntraMuscular once PRN Glucose LESS THAN 70 milligrams/deciliter  ondansetron Injectable 4 milliGRAM(s) IV Push every 6 hours PRN Nausea      Allergies    No Known Allergies    Intolerances        SOCIAL HISTORY:    ENDOSCOPIC/GI HISTORY:    FAMILY HISTORY:  No pertinent family history in first degree relatives      Vital Signs Last 24 Hrs  T(C): 36.4 (14 Mar 2018 07:19), Max: 37 (13 Mar 2018 23:05)  T(F): 97.6 (14 Mar 2018 07:19), Max: 98.6 (13 Mar 2018 23:05)  HR: 91 (14 Mar 2018 07:19) (77 - 91)  BP: 168/85 (14 Mar 2018 07:19) (143/83 - 184/93)  BP(mean): --  RR: 18 (14 Mar 2018 07:19) (18 - 23)  SpO2: 93% (14 Mar 2018 07:19) (93% - 98%)    PHYSICAL EXAM:    GENERAL: NAD, well-groomed, well-developed  HEAD:  Atraumatic, Normocephalic  EYES: EOMI, PERRLA, conjunctiva and sclera clear  ENMT: No tonsillar erythema, exudates, or enlargement; Moist mucous membranes, Good dentition, No lesions  NECK: Supple, No JVD, Normal thyroid  CHEST/LUNG: Clear to percussion bilaterally; No rales, rhonchi, wheezing, or rubs  HEART: Regular rate and rhythm; No murmurs, rubs, or gallops  ABDOMEN: Soft, Nontender, Nondistended; Bowel sounds present  EXTREMITIES:  2+ Peripheral Pulses, No clubbing, cyanosis, or edema  LYMPH: No lymphadenopathy noted  SKIN: No rashes or lesions      LABS:                        7.0    7.9   )-----------( 505      ( 13 Mar 2018 21:05 )             21.1     03-13    129<L>  |  92<L>  |  69.0<H>  ----------------------------<  235<H>  4.8   |  21.0<L>  |  5.79<H>    Ca    8.8      13 Mar 2018 21:05    TPro  6.5<L>  /  Alb  2.8<L>  /  TBili  <0.2<L>  /  DBili  x   /  AST  12  /  ALT  8   /  AlkPhos  93  03-13       Urinalysis Basic - ( 14 Mar 2018 03:01 )    Color: Yellow / Appearance: Clear / S.010 / pH: x  Gluc: x / Ketone: Negative  / Bili: Negative / Urobili: Negative mg/dL   Blood: x / Protein: 500 mg/dL / Nitrite: Negative   Leuk Esterase: Negative / RBC: 6-10 /HPF / WBC Negative   Sq Epi: x / Non Sq Epi: Few / Bacteria: x        LIVER FUNCTIONS - ( 13 Mar 2018 21:05 )  Alb: 2.8 g/dL / Pro: 6.5 g/dL / ALK PHOS: 93 U/L / ALT: 8 U/L / AST: 12 U/L / GGT: x               RADIOLOGY & ADDITIONAL STUDIES:  < from: CT Chest w/ Oral Cont (18 @ 00:11) >  INTERPRETATION:  Prelim:  Patchy airspace consolidation in the right upper, right middle and   bilateral lower lobes which may reflect pulmonary edema and/or   infiltrates. Moderate bilateral pleural effusions. Small pericardial   effusion and cardiomegaly.    Follow-up official report.    < end of copied text >  < from: Xray Chest 2 Views PA/Lat (18 @ 20:43) >  INTERPRETATION:    CHEST X-RAY PA AND LATERAL:    History: Chest pain.    Date and time of exam: 3/13/2018 8:30 PM.    Comparison exam: 2017.    Technique: PA and lateral views of the chest were obtained.    Findings:  Cardiomegaly. Bibasilar atelectasis. Right pleural effusion is noted.   There is a diffuse infiltrate in the right lung. No hilar or mediastinal   abnormality.    Impression:  Right lunginfiltrate. Bibasilar atelectasis. Right pleural effusion..      < end of copied text > HPI:  Ms. Elizabeth is a 56 yo F with PMH s/f HTN/HLD, DMII, hyperkalemia who presents with 3 weeks of postprandial vomiting and stomach pain. Patient states that for the past three weeks, she has had nausea with postprandial emesis of food contents.  Today she vomited twice, one time which had an isolated episode of scant bright red blood in the vomitus. After vomiting she feels lightheadedness/weakness without vertigo. Associated with the nausea she has had a pleuritic chest pain, 10/10 in severity, intermittent, which arises after she vomits, worsens with leaning forward, alleviated with supine position. For the pain she has taken 600mg motrin once a day, but due to stomach upset from the Motrin also took Tylenol for pain.     Patient states bowel movements have been dark brown.   Patient also has had a tactile fever with chills, productive cough with white sputum. Denies sick contacts/recent travel. No dysuria, hematuria, hematochezia, melena.   Of note, patient had recent negative stress test in January .     Patient mentioned she had a colonoscopy about 3 years ago. No rectal bleeding.       PAST MEDICAL & SURGICAL HISTORY:  HLD (hyperlipidemia)  HTN (hypertension)  DM (diabetes mellitus)  No significant past surgical history      ROS:  No Heartburn, regurgitation, dysphagia, odynophagia.  No dyspepsia  No abdominal pain.    No Nausea, vomiting.  No Bleeding.  No hematemesis.   No diarrhea.    No hematochezia  No weight loss, anorexia.  No edema.      MEDICATIONS  (STANDING):  artificial  tears Solution 1 Drop(s) Both EYES every 12 hours  azithromycin  IVPB      cefTRIAXone   IVPB 1 Gram(s) IV Intermittent every 24 hours  dextrose 5%. 1000 milliLiter(s) (50 mL/Hr) IV Continuous <Continuous>  dextrose 50% Injectable 12.5 Gram(s) IV Push once  dextrose 50% Injectable 25 Gram(s) IV Push once  dextrose 50% Injectable 25 Gram(s) IV Push once  hydrALAZINE 10 milliGRAM(s) Oral every 8 hours  insulin lispro (HumaLOG) corrective regimen sliding scale   SubCutaneous three times a day before meals  insulin lispro (HumaLOG) corrective regimen sliding scale   SubCutaneous at bedtime  pantoprazole  Injectable 40 milliGRAM(s) IV Push daily  simvastatin 40 milliGRAM(s) Oral at bedtime    MEDICATIONS  (PRN):  acetaminophen   Tablet 650 milliGRAM(s) Oral every 6 hours PRN For Temp greater than 38 C (100.4 F)  acetaminophen   Tablet. 650 milliGRAM(s) Oral every 6 hours PRN Moderate Pain (4 - 6)  dextrose Gel 1 Dose(s) Oral once PRN Blood Glucose LESS THAN 70 milliGRAM(s)/deciliter  glucagon  Injectable 1 milliGRAM(s) IntraMuscular once PRN Glucose LESS THAN 70 milligrams/deciliter  ondansetron Injectable 4 milliGRAM(s) IV Push every 6 hours PRN Nausea      Allergies    No Known Allergies    Intolerances        SOCIAL HISTORY:Denies x3    ENDOSCOPIC/GI HISTORY:    FAMILY HISTORY:  No pertinent family history in first degree relatives      Vital Signs Last 24 Hrs  T(C): 36.4 (14 Mar 2018 07:19), Max: 37 (13 Mar 2018 23:05)  T(F): 97.6 (14 Mar 2018 07:19), Max: 98.6 (13 Mar 2018 23:05)  HR: 91 (14 Mar 2018 07:19) (77 - 91)  BP: 168/85 (14 Mar 2018 07:19) (143/83 - 184/93)  BP(mean): --  RR: 18 (14 Mar 2018 07:19) (18 - 23)  SpO2: 93% (14 Mar 2018 07:19) (93% - 98%)    PHYSICAL EXAM:    GENERAL: NAD, well-groomed, well-developed  HEAD:  Atraumatic, Normocephalic  EYES: EOMI, PERRLA, conjunctiva and sclera clear  ENMT: No tonsillar erythema, exudates, or enlargement; Moist mucous membranes, Good dentition, No lesions  NECK: Supple, No JVD, Normal thyroid  CHEST/LUNG: Clear to percussion bilaterally; No rales, rhonchi, wheezing, or rubs  HEART: Regular rate and rhythm; No murmurs, rubs, or gallops  ABDOMEN: Soft, Nontender, Nondistended; Bowel sounds present  EXTREMITIES:  2+ Peripheral Pulses, No clubbing, cyanosis, or edema  LYMPH: No lymphadenopathy noted  SKIN: No rashes or lesions      LABS:                        7.0    7.9   )-----------( 505      ( 13 Mar 2018 21:05 )             21.1     03-13    129<L>  |  92<L>  |  69.0<H>  ----------------------------<  235<H>  4.8   |  21.0<L>  |  5.79<H>    Ca    8.8      13 Mar 2018 21:05    TPro  6.5<L>  /  Alb  2.8<L>  /  TBili  <0.2<L>  /  DBili  x   /  AST  12  /  ALT  8   /  AlkPhos  93  03-13       Urinalysis Basic - ( 14 Mar 2018 03:01 )    Color: Yellow / Appearance: Clear / S.010 / pH: x  Gluc: x / Ketone: Negative  / Bili: Negative / Urobili: Negative mg/dL   Blood: x / Protein: 500 mg/dL / Nitrite: Negative   Leuk Esterase: Negative / RBC: 6-10 /HPF / WBC Negative   Sq Epi: x / Non Sq Epi: Few / Bacteria: x        LIVER FUNCTIONS - ( 13 Mar 2018 21:05 )  Alb: 2.8 g/dL / Pro: 6.5 g/dL / ALK PHOS: 93 U/L / ALT: 8 U/L / AST: 12 U/L / GGT: x               RADIOLOGY & ADDITIONAL STUDIES:  < from: CT Chest w/ Oral Cont (18 @ 00:11) >  INTERPRETATION:  Prelim:  Patchy airspace consolidation in the right upper, right middle and   bilateral lower lobes which may reflect pulmonary edema and/or   infiltrates. Moderate bilateral pleural effusions. Small pericardial   effusion and cardiomegaly.    Follow-up official report.    < end of copied text >  < from: Xray Chest 2 Views PA/Lat (18 @ 20:43) >  INTERPRETATION:    CHEST X-RAY PA AND LATERAL:    History: Chest pain.    Date and time of exam: 3/13/2018 8:30 PM.    Comparison exam: 2017.    Technique: PA and lateral views of the chest were obtained.    Findings:  Cardiomegaly. Bibasilar atelectasis. Right pleural effusion is noted.   There is a diffuse infiltrate in the right lung. No hilar or mediastinal   abnormality.    Impression:  Right lunginfiltrate. Bibasilar atelectasis. Right pleural effusion..      < end of copied text > HPI:  Ms. Elizabeth is a 58 yo F with PMH s/f HTN/HLD, DMII, hyperkalemia who presents with 3 weeks of postprandial vomiting and stomach pain. Patient states that for the past three weeks, she has had nausea with postprandial emesis of food contents.  Today she vomited twice, one time which had an isolated episode of scant bright red blood in the vomitus. After vomiting she feels lightheadedness/weakness without vertigo. Associated with the nausea she has had a pleuritic chest pain, 10/10 in severity, intermittent, which arises after she vomits, worsens with leaning forward, alleviated with supine position. For the pain she has taken 600mg motrin once a day, but due to stomach upset from the Motrin also took Tylenol for pain.     Patient states bowel movements have been dark brown.   Patient also has had a tactile fever with chills, productive cough with white sputum. Denies sick contacts/recent travel. No dysuria, hematuria, hematochezia, melena.   Of note, patient had recent negative stress test in January .     Patient mentioned she had a colonoscopy about 3 years ago. No rectal bleeding.       PAST MEDICAL & SURGICAL HISTORY:  HLD (hyperlipidemia)  HTN (hypertension)  DM (diabetes mellitus)  No significant past surgical history      ROS:  No Heartburn, regurgitation, dysphagia, odynophagia.  No dyspepsia  + abdominal pain.    + Nausea, vomiting.  No Bleeding.  No hematemesis.   No diarrhea.    No hematochezia  No weight loss, anorexia.  No edema.      MEDICATIONS  (STANDING):  artificial  tears Solution 1 Drop(s) Both EYES every 12 hours  azithromycin  IVPB      cefTRIAXone   IVPB 1 Gram(s) IV Intermittent every 24 hours  dextrose 5%. 1000 milliLiter(s) (50 mL/Hr) IV Continuous <Continuous>  dextrose 50% Injectable 12.5 Gram(s) IV Push once  dextrose 50% Injectable 25 Gram(s) IV Push once  dextrose 50% Injectable 25 Gram(s) IV Push once  hydrALAZINE 10 milliGRAM(s) Oral every 8 hours  insulin lispro (HumaLOG) corrective regimen sliding scale   SubCutaneous three times a day before meals  insulin lispro (HumaLOG) corrective regimen sliding scale   SubCutaneous at bedtime  pantoprazole  Injectable 40 milliGRAM(s) IV Push daily  simvastatin 40 milliGRAM(s) Oral at bedtime    MEDICATIONS  (PRN):  acetaminophen   Tablet 650 milliGRAM(s) Oral every 6 hours PRN For Temp greater than 38 C (100.4 F)  acetaminophen   Tablet. 650 milliGRAM(s) Oral every 6 hours PRN Moderate Pain (4 - 6)  dextrose Gel 1 Dose(s) Oral once PRN Blood Glucose LESS THAN 70 milliGRAM(s)/deciliter  glucagon  Injectable 1 milliGRAM(s) IntraMuscular once PRN Glucose LESS THAN 70 milligrams/deciliter  ondansetron Injectable 4 milliGRAM(s) IV Push every 6 hours PRN Nausea      Allergies    No Known Allergies    Intolerances        SOCIAL HISTORY:Denies x3    ENDOSCOPIC/GI HISTORY:    FAMILY HISTORY:  No pertinent family history in first degree relatives      Vital Signs Last 24 Hrs  T(C): 36.4 (14 Mar 2018 07:19), Max: 37 (13 Mar 2018 23:05)  T(F): 97.6 (14 Mar 2018 07:19), Max: 98.6 (13 Mar 2018 23:05)  HR: 91 (14 Mar 2018 07:19) (77 - 91)  BP: 168/85 (14 Mar 2018 07:19) (143/83 - 184/93)  BP(mean): --  RR: 18 (14 Mar 2018 07:19) (18 - 23)  SpO2: 93% (14 Mar 2018 07:19) (93% - 98%)    PHYSICAL EXAM:    GENERAL: NAD, well-groomed, well-developed  HEAD:  Atraumatic, Normocephalic  EYES: EOMI, PERRLA, conjunctiva and sclera clear  ENMT: No tonsillar erythema, exudates, or enlargement; Moist mucous membranes, Good dentition, No lesions  NECK: Supple, No JVD, Normal thyroid  CHEST/LUNG: Clear to percussion bilaterally; No rales, rhonchi, wheezing, or rubs  HEART: Regular rate and rhythm; No murmurs, rubs, or gallops  ABDOMEN: Soft, Nontender, Nondistended; Bowel sounds present  RECTAL: Empty rectal vault  EXTREMITIES:  2+ Peripheral Pulses, No clubbing, cyanosis, or edema  LYMPH: No lymphadenopathy noted  SKIN: No rashes or lesions      LABS:                        7.0    7.9   )-----------( 505      ( 13 Mar 2018 21:05 )             21.1     03-13    129<L>  |  92<L>  |  69.0<H>  ----------------------------<  235<H>  4.8   |  21.0<L>  |  5.79<H>    Ca    8.8      13 Mar 2018 21:05    TPro  6.5<L>  /  Alb  2.8<L>  /  TBili  <0.2<L>  /  DBili  x   /  AST  12  /  ALT  8   /  AlkPhos  93  03-13       Urinalysis Basic - ( 14 Mar 2018 03:01 )    Color: Yellow / Appearance: Clear / S.010 / pH: x  Gluc: x / Ketone: Negative  / Bili: Negative / Urobili: Negative mg/dL   Blood: x / Protein: 500 mg/dL / Nitrite: Negative   Leuk Esterase: Negative / RBC: 6-10 /HPF / WBC Negative   Sq Epi: x / Non Sq Epi: Few / Bacteria: x        LIVER FUNCTIONS - ( 13 Mar 2018 21:05 )  Alb: 2.8 g/dL / Pro: 6.5 g/dL / ALK PHOS: 93 U/L / ALT: 8 U/L / AST: 12 U/L / GGT: x           Iron with Total Binding Capacity (18 @ 22:35)    Iron - Total Binding Capacity.: 256 ug/dL    % Saturation, Iron: 7 %    Iron Total, Serum: 18 ug/dL        RADIOLOGY & ADDITIONAL STUDIES:  < from: CT Chest w/ Oral Cont (18 @ 00:11) >  INTERPRETATION:  Prelim:  Patchy airspace consolidation in the right upper, right middle and   bilateral lower lobes which may reflect pulmonary edema and/or   infiltrates. Moderate bilateral pleural effusions. Small pericardial   effusion and cardiomegaly.    Follow-up official report.    < end of copied text >  < from: Xray Chest 2 Views PA/Lat (18 @ 20:43) >  INTERPRETATION:    CHEST X-RAY PA AND LATERAL:    History: Chest pain.    Date and time of exam: 3/13/2018 8:30 PM.    Comparison exam: 2017.    Technique: PA and lateral views of the chest were obtained.    Findings:  Cardiomegaly. Bibasilar atelectasis. Right pleural effusion is noted.   There is a diffuse infiltrate in the right lung. No hilar or mediastinal   abnormality.    Impression:  Right lunginfiltrate. Bibasilar atelectasis. Right pleural effusion..      < end of copied text >

## 2018-03-14 NOTE — PROGRESS NOTE ADULT - ASSESSMENT
56 yo F with PMH s/f HTN/HLD, DMII, hyperkalemia who presents with 3 weeks of postprandial vomiting and stomach pain, symptomatic anemia and concern for GI bleed with recent ibuprofen use, with tactile fever and productive cough suggestive of pneumonia, found to have Right lung infiltrate on CXR, with acute on chronic renal insufficiency and dehydration, currently vitally stable 58 yo F with PMH s/f HTN/HLD, DMII, hyperkalemia, ckd 3 who presents with 3 weeks of postprandial vomiting and abd pain, noted to have ARF, fluid overload with bilateral pleural effusions and pericardial effsuion as well as symptomatic anemia likely multifactorial secondary to acute on chronic renal failure/ KULWANT and suspected upper GI Bleeding from vomiting/retching and NSAID use. 56 yo F with PMH s/f HTN/HLD, DMII, hyperkalemia, ckd 3 who presents with 3 weeks of postprandial vomiting and abd pain, noted to have ARF, fluid overload with bilateral pleural effusions and pericardial effsuion as well as symptomatic anemia likely multifactorial secondary to acute on chronic renal failure/ KULWANT and suspected upper GI Bleeding from vomiting/retching and NSAID use. Clinical concern that worsening acute on chronic renal failure possibly caused uremia related sx of N/V, with patients NSAID use and retching suspected GI bleed. Thereafter with epigastric and chest pain, worsening renal function causing fluid overload with b/l pleural and pericardial effusion. Pt actively being diuresed. Cardiology, gastroenterology and nephrology consulted.

## 2018-03-14 NOTE — CONSULT NOTE ADULT - ASSESSMENT
Assessment and Plan:    Assessment:  · Assessment		   58 yo F with PMH s/f HTN/HLD, DMII, hyperkalemia who presents with 3 weeks of postprandial vomiting and stomach pain, symptomatic anemia and concern for GI bleed with recent ibuprofen use, with tactile fever and productive cough suggestive of pneumonia, found to have Right lung infiltrate on CXR, with acute on chronic renal insufficiency and dehydration, currently vitally stable       Problem/Plan - 1:  ·  Problem: Symptomatic anemia.  Plan: Per PCT   Problem/Plan - 2:  ·  Problem: Chest pain.  Plan: Pleuritic chest pain worsened after vomiting, reproducible to palpation on exam likely component of costochondritis in the setting of possible pneumonia     Patient has multiple risk factors for CAD with nonspecific T wave changes and a minimal increased tropin more c/w elevated Bun/Cr . Ischemic W/U, TTE   Problem/Plan - 3:  ·  Problem: Intractable nausea and vomiting.  Plan: Pre PCT   Problem/Plan - 4:  ·  Problem: Lung infiltrate.  Plan: Per PCT   Problem/Plan - 5:  ·  Problem: Acute on chronic renal insufficiency.  Plan: Per Dr Kevin   Problem/Plan - 6:  Problem: Elevated troponin level. Plan: See Problem #2     Problem/Plan - 7:  ·  Problem: Hyperglycemia due to type 2 diabetes mellitus.  Plan: Pre PCT   Problem/Plan - 8:  ·  Problem: Hypertension.  Plan: Controlled on medication outpatient   Losartan 100 QD outpatient, will hold for now due to acute renal insufficiency    will c/t monitor overnight and consider adding PO regimen of CCB or Hydralazine Assessment and Plan:    Assessment:  · Assessment		   58 yo F with PMH s/f HTN/HLD, DMII, hyperkalemia who presents with 3 weeks of postprandial vomiting and stomach pain, symptomatic anemia and concern for GI bleed with recent ibuprofen use, with tactile fever and productive cough suggestive of pneumonia, found to have Right lung infiltrate on CXR, with acute on chronic renal insufficiency and dehydration, currently vitally stable       Problem/Plan - 1:  ·  Problem: Symptomatic anemia.  Plan: Per PCT   Problem/Plan - 2:  ·  Problem: Chest pain.  Plan: Pleuritic chest pain worsened after vomiting, reproducible to palpation on exam likely component of costochondritis in the setting of possible pneumonia     Patient has multiple risk factors for CAD with nonspecific T wave changes and a minimal increased tropin more c/w elevated Bun/Cr . Ischemic W/U, TTE   Problem/Plan - 3:  ·  Problem: Intractable nausea and vomiting.  Plan: Pre PCT   Problem/Plan - 4:  ·  Problem: Lung infiltrate.  Plan: Per PCT   Problem/Plan - 5:  ·  Problem: Acute on chronic renal insufficiency.  Plan: Per Dr Kevin   Problem/Plan - 6:  Problem: Elevated troponin level. Plan: See Problem #2     Problem/Plan - 7:  ·  Problem: Hyperglycemia due to type 2 diabetes mellitus.  Plan: Pre PCT    Problem/Plan - 8:  ·  Problem: Hypertension.  Plan: Controlled on medication outpatient   Losartan 100 QD outpatient, will hold for now due to acute renal insufficiency    will c/t monitor overnight and consider adding PO regimen of CCB or Hydralazine    Problem/Plan - 9:  ·  Problem: Hyperlipidemia:  C/W a Stain check lipid panel Ms. Elizabeth is a 58 yo [place] speaking female with hx of hypertension, hyperlipidemia, type 2 diabetes, with 1 month hx of vomiting and abdominal pain, with NSAID use.  Developed pleuritic chest pain, worsened in supine position and with deep breaths.  Also noted to be anemic.    1. abdominal pain/nausea/vomiting- being evaluated by GI  2. chest pain -  Her troponin is elevated, but CK is normal.  This is not consistent with acute coronary syndrome.  May have had demand ischemia in setting of blood loss anemia.  DDx - gastritis, but given pleuritic and positional nature, may have pericarditis.  For now hold on NSAIDS/colchicine given ongoing GI and renal insufficiency. Keep Hb > 9.  Monitor H& H.  Reviewed echo from this morning, there are no regional wall motion abnormalities.  Would prioritize GI workup.  Will need eventual ischemia evaluation.   Ordered ESR, CRP, and BNP.  No need to trend troponin. PPI  3. anemia - probably chronic or subacute .  GI workup.  CKD  Keep Hb > 9  4. hypertension - uncontrolled, may start CCB for additional control  5. pleural effusions/pericardial effusions - will evaluate for HFPEF, ? exudative in nature.  Consider diagnostic thoracentesis.  6. acute on chronic renal insufficiency -   agree hold ARB for now, seen by Dr. Gay.      Will follow.    Thank you for allowing me to participate in your patient's care.  D/W Dr. Agee

## 2018-03-14 NOTE — PROGRESS NOTE ADULT - PROBLEM SELECTOR PLAN 9
Not hyperkalemic at this time  -f/up AM CMP Controlled on medication outpatient  -losartan 100 QD outpatient, will hold for now due to acute renal insufficiency  -will c/t monitor overnight and consider adding PO regimen of CCB if needed

## 2018-03-14 NOTE — PROGRESS NOTE ADULT - PROBLEM SELECTOR PLAN 6
Troponemia likely in the setting of CKD and demand ischemia due to anemia  -troponin 0.16 at 2100, repeat at 2:20am 0.14, f/up repeat at 9am with worsening progression of chronic kidney disease stage V   -prerenal azotemia likely 2/2 dehydration with nausea/vomiting for 3 weeks  -BUN/Cr 69/5.79 worsened from baseline in Feb 2017 of 23/1.3   -f/up repeat UA  -strict I/O Q 6 hours  -f/up renal consult (Dr. Kevin) -bp now better controlled   -c/w clonidine and will up titrate as needed  -c/w hydralzine 25mg po q8hrs   -holding ace inhibitor due to arf

## 2018-03-14 NOTE — PROGRESS NOTE ADULT - SUBJECTIVE AND OBJECTIVE BOX
Patient is a 57y old  Female who presents with a chief complaint of dark stools (14 Mar 2018 02:54)      INTERVAL HPI/OVERNIGHT EVENTS:  57y  Female seen and examined at bedside. Patient states she was able to sleep, still feels nausea with abdominal pain as well as tactile fever/chills. States she had no further vomiting overnight but requested zofran this morning for nausea, otherwise ROS unremarkable.       Allergies    No Known Allergies    Intolerances        Vital Signs Last 24 Hrs  T(C): 36.4 (14 Mar 2018 07:19), Max: 37 (13 Mar 2018 23:05)  T(F): 97.6 (14 Mar 2018 07:19), Max: 98.6 (13 Mar 2018 23:05)  HR: 91 (14 Mar 2018 07:19) (77 - 91)  BP: 168/85 (14 Mar 2018 07:19) (143/83 - 184/93)  BP(mean): --  RR: 18 (14 Mar 2018 07:19) (18 - 23)  SpO2: 93% (14 Mar 2018 07:19) (93% - 98%)      Daily Height in cm: 157.48 (13 Mar 2018 19:13)    I/O not recorded          PHYSICAL EXAM:    	Constitutional: NAD, laying in bed, pale-appearing  	HEENT: PERRLA, EOMI ; conjunctival pallor  	Neck: No JVD, supple  	Back: Normal spine flexure, No CVA tenderness  	Respiratory: +inspiratory and expiratory crackles in lower lobes b/l, R>L, decreased breath sounds at bases ; no wheezing, rhonchi  	Cardiovascular: S1 and S2, RRR, no m/r/g; Chest pain reproducible to palpation across sternum in mid-sternal region extending to left parasternal region, 2nd -4th costal cartilages  	Gastrointestinal: BS+ normoactive, soft, ND, NTTP   	Extremities: +2 pitting edema b/l up to knees  	Vascular: 2+ peripheral pulses; cap refill >2 sec, pallor to extremities and nailbeds  	Neurological: AAO x 3, no focal deficits  	Psychiatric: Normal mood, normal affect  	Musculoskeletal: 5/5 strength b/l upper and lower extremities  Skin: No rashes, no diaphoresis    LABS:                        7.0    7.9   )-----------( 505      ( 13 Mar 2018 21:05 )             21.1     CBC Full  -  ( 13 Mar 2018 21:05 )  WBC Count : 7.9 K/uL  Hemoglobin : 7.0 g/dL  Hematocrit : 21.1 %  Platelet Count - Automated : 505 K/uL  Mean Cell Volume : 83.6 fl  Mean Cell Hemoglobin : 28.0 pg  Mean Cell Hemoglobin Concentration : 33.5 g/dL  Auto Neutrophil # : 7.2 K/uL  Auto Lymphocyte # : 0.5 K/uL  Auto Monocyte # : 0.5 K/uL  Auto Eosinophil # : 0.1 K/uL  Auto Basophil # : 0.0 K/uL  Auto Neutrophil % : 80.0 %  Auto Lymphocyte % : 10.0 %  Auto Monocyte % : 7.0 %  Auto Eosinophil % : 3.0 %  Auto Basophil % : 0.0 %        129<L>  |  92<L>  |  69.0<H>  ----------------------------<  235<H>  4.8   |  21.0<L>  |  5.79<H>    Ca    8.8      13 Mar 2018 21:05    TPro  6.5<L>  /  Alb  2.8<L>  /  TBili  <0.2<L>  /  DBili  x   /  AST  12  /  ALT  8   /  AlkPhos  93  03-13      Urinalysis Basic - ( 14 Mar 2018 03:01 )    Color: Yellow / Appearance: Clear / S.010 / pH: x  Gluc: x / Ketone: Negative  / Bili: Negative / Urobili: Negative mg/dL   Blood: x / Protein: 500 mg/dL / Nitrite: Negative   Leuk Esterase: Negative / RBC: 6-10 /HPF / WBC Negative   Sq Epi: x / Non Sq Epi: Few / Bacteria: x              Albumin, Serum: 2.8 g/dL ( @ 21:05)    LIVER FUNCTIONS - ( 13 Mar 2018 21:05 )  Alb: 2.8 g/dL / Pro: 6.5 g/dL / ALK PHOS: 93 U/L / ALT: 8 U/L / AST: 12 U/L / GGT: x             RADIOLOGY & ADDITIONAL TESTS: Patient is a 57y old  Female who presents with a chief complaint of dark stools (14 Mar 2018 02:54)      INTERVAL HPI/OVERNIGHT EVENTS:  57y  Female seen and examined at bedside. Patient states she was able to sleep, still feels nausea with abdominal pain as well as tactile fever/chills. States she had no further vomiting overnight but requested zofran this morning for nausea, also still has sob and left sided chest pain; otherwise ROS unremarkable.       Allergies    No Known Allergies    Intolerances        Vital Signs Last 24 Hrs  T(C): 36.4 (14 Mar 2018 07:19), Max: 37 (13 Mar 2018 23:05)  T(F): 97.6 (14 Mar 2018 07:19), Max: 98.6 (13 Mar 2018 23:05)  HR: 91 (14 Mar 2018 07:19) (77 - 91)  BP: 168/85 (14 Mar 2018 07:19) (143/83 - 184/93)  BP(mean): --  RR: 18 (14 Mar 2018 07:19) (18 - 23)  SpO2: 93% (14 Mar 2018 07:19) (93% - 98%)      Daily Height in cm: 157.48 (13 Mar 2018 19:13)    I/O not recorded          PHYSICAL EXAM:    	Constitutional: NAD, laying in bed, pale-appearing  	HEENT: PERRLA, EOMI ; conjunctival pallor  	Neck: No JVD, supple  	Back: Normal spine flexure, No CVA tenderness  	Respiratory: +inspiratory and expiratory crackles in lower lobes b/l, R>L, decreased breath sounds at bases ; no wheezing, rhonchi  	Cardiovascular: S1 and S2, RRR, no m/r/g; Chest pain reproducible to palpation across sternum in mid-sternal region extending to left parasternal region, 2nd -4th costal cartilages  	Gastrointestinal: BS+ normoactive, soft, ND, NTTP   	Extremities: +2 pitting edema b/l up to knees  	Vascular: 2+ peripheral pulses; cap refill >2 sec, pallor to extremities and nailbeds  	Neurological: AAO x 3, no focal deficits  	Psychiatric: Normal mood, normal affect  	Musculoskeletal: 5/5 strength b/l upper and lower extremities  Skin: No rashes, no diaphoresis    LABS:                        7.0    7.9   )-----------( 505      ( 13 Mar 2018 21:05 )             21.1     CBC Full  -  ( 13 Mar 2018 21:05 )  WBC Count : 7.9 K/uL  Hemoglobin : 7.0 g/dL  Hematocrit : 21.1 %  Platelet Count - Automated : 505 K/uL  Mean Cell Volume : 83.6 fl  Mean Cell Hemoglobin : 28.0 pg  Mean Cell Hemoglobin Concentration : 33.5 g/dL  Auto Neutrophil # : 7.2 K/uL  Auto Lymphocyte # : 0.5 K/uL  Auto Monocyte # : 0.5 K/uL  Auto Eosinophil # : 0.1 K/uL  Auto Basophil # : 0.0 K/uL  Auto Neutrophil % : 80.0 %  Auto Lymphocyte % : 10.0 %  Auto Monocyte % : 7.0 %  Auto Eosinophil % : 3.0 %  Auto Basophil % : 0.0 %        129<L>  |  92<L>  |  69.0<H>  ----------------------------<  235<H>  4.8   |  21.0<L>  |  5.79<H>    Ca    8.8      13 Mar 2018 21:05    TPro  6.5<L>  /  Alb  2.8<L>  /  TBili  <0.2<L>  /  DBili  x   /  AST  12  /  ALT  8   /  AlkPhos  93  13      Urinalysis Basic - ( 14 Mar 2018 03:01 )    Color: Yellow / Appearance: Clear / S.010 / pH: x  Gluc: x / Ketone: Negative  / Bili: Negative / Urobili: Negative mg/dL   Blood: x / Protein: 500 mg/dL / Nitrite: Negative   Leuk Esterase: Negative / RBC: 6-10 /HPF / WBC Negative   Sq Epi: x / Non Sq Epi: Few / Bacteria: x              Albumin, Serum: 2.8 g/dL ( @ 21:05)    LIVER FUNCTIONS - ( 13 Mar 2018 21:05 )  Alb: 2.8 g/dL / Pro: 6.5 g/dL / ALK PHOS: 93 U/L / ALT: 8 U/L / AST: 12 U/L / GGT: x             RADIOLOGY & ADDITIONAL TESTS:
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
## 2018-03-14 NOTE — ED ADULT NURSE REASSESSMENT NOTE - GENERAL PATIENT STATE
family/SO at bedside/comfortable appearance
comfortable appearance
EOAE (evoked otoacoustic emission)

## 2018-03-14 NOTE — PROGRESS NOTE ADULT - PROBLEM SELECTOR PLAN 3
Postprandial nausea/vomiting with scant hematemesis  -Advance diet as tolerated  -IV fluids for dehydration  -zofran Q6 PRN nausea/vomiting  -c/t monitor vitals Pleuritic chest pain worsened after vomiting, reproducible to palpation on exam likely component of costochondritis in the setting of possible pneumonia  -Tylenol PRN pain  -EKG NSR with nonspecific T wave inversion  -troponin 0.16 x 1, will f/up repeat at 3am  -no tachycardia at this time  -recent outpatient stress test WNL in January 2018  -f/up cardio consult in AM Pleuritic and positional chest pain, with moderate pleural effusion, elevated troponins  -Tylenol PRN pain  -EKG NSR with nonspecific T wave inversion  -will trend serial troponin could also be elevated due to ARF   -recent outpatient stress test WNL in January 2018  -Cardio consult noted and appreciated and d/w Dr. Salazar the plan of care. Could be secondary to epigastric pain from vomiting/ pericarditis will hold off on tx given ARF due to worsening acute on chronic renal insufficiency in the setting of CKD vs suspected GI bleed  -visible pallor, worsening sx of sob   -s/p 2 units pRBC  -last colonoscopy /EGD 2017 WNL as per patient  -occult stool neg  -will c/w monitoring h/h   -anemia panel noted with degree iron deficiency   -Protonix 40 po bid changed from IV QD   -c/w iron sucrose  -pt could benefit from epogen   -GI consulted and recommended conservative management at this time

## 2018-03-14 NOTE — PROGRESS NOTE ADULT - PROBLEM SELECTOR PLAN 7
Chronic DMII controlled Janumet  BID-held due to MARSHALL  -low dose HSS while inpatient, will adjust as needed  -0.3 x 68kg , 20 units likely needed based on weight Troponemia likely in the setting of CKD and demand ischemia due to anemia  -troponin 0.16 at 2100, repeat at 2:20am 0.14, f/up repeat at 9am HBA1C: 7.6   fs stable  -c/w accuchecks achs tid   -c/w HSS  -c/w hypoglycemia protocol

## 2018-03-14 NOTE — PROGRESS NOTE ADULT - PROBLEM SELECTOR PLAN 2
Pleuritic chest pain worsened after vomiting, reproducible to palpation on exam likely component of costochondritis in the setting of possible pneumonia  -Tylenol PRN pain  -EKG NSR with nonspecific T wave inversion  -troponin 0.16 x 1, will f/up repeat at 3am  -no tachycardia at this time  -recent outpatient stress test WNL in January 2018  -f/up cardio consult in AM Bilateral pleural effusions and pericardial effusion baseline ckd 3 now in ARF with uremia sx and possible pericarditis also with sx of fluid overload  -BUN/Cr 69/5.79 worsened from baseline in Feb 2017 of 23/1.3  and a creatine of 3.7 on 3/9/18 recently   -c/w monitoring bun/cr   -f/up repeat UA  -strict I/O Q 6 hours  -diuresing lasix 40 iv bid x 2 days   -avoid nephrotoxic medications   -renal US noted with chronic medical renal dz no evidence of hydronephrosis   -nephro consult noted and appreciated and d/w Dr. Gay the above plan of care, if no improvement with diuresis pt will likely need dialysis Bilateral pleural effusions and pericardial effusion, noted tte with preserved ef less likely HF related concern for fluid status being secondary to ARF   -c/w diuresis with lasix 40 mg IV BID   -tte shows preserved EF and moderate pericardiac effusion unclear if this is related to pericarditis   -c/w supplemental o2 abg noted with low pao2 will increase oxygen supply from NC to venti mask

## 2018-03-14 NOTE — PROGRESS NOTE ADULT - PROBLEM SELECTOR PLAN 1
due to worsening acute on chronic renal insufficiency in the setting of CKD vs suspected GI bleed  -visible pallor and decreased cap refill on exam  -receiving 2 units pRBC  -last colonoscopy /EGD 2017 WNL as per patient  -f/up consult with Dr. Patton GI  -f/up AM CBC, CMP, Mag , Phos  -monitored bed +    -f/up stool guaiac  -serum ferritin 183, transferrin low 179 likely 2/2 anemia of chronic disease with component of iron deficiency with total iron low at 18, possible malnutrition component  -Protonix 40 IV daily for GI prophylaxis  -fall risk protocol, ambulate with assistance due to worsening acute on chronic renal insufficiency in the setting of CKD vs suspected GI bleed  -visible pallor, worsening sx of sob   -s/p 2 units pRBC  -last colonoscopy /EGD 2017 WNL as per patient  -occult stool neg  -will c/w monitoring h/h   -anemia panel noted with degree iron deficiency   -Protonix 40 po bid changed from IV QD   -c/w iron sucrose  -pt could benefit from epogen   -GI consulted and recommended conservative management at this time baseline ckd 3 now in ARF with uremia sx and possible pericarditis also with sx of fluid overload  -BUN/Cr 69/5.79 worsened from baseline in Feb 2017 of 23/1.3  and a creatine of 3.7 on 3/9/18 recently   -c/w monitoring bun/cr   -f/up repeat UA  -strict I/O Q 6 hours  -diuresing lasix 40 iv bid x 2 days   -avoid nephrotoxic medications   -renal US noted with chronic medical renal dz no evidence of hydronephrosis   -nephro consult noted and appreciated and d/w Dr. Gay the above plan of care, if no improvement with diuresis pt will likely need dialysis

## 2018-03-15 DIAGNOSIS — R10.13 EPIGASTRIC PAIN: ICD-10-CM

## 2018-03-15 DIAGNOSIS — D64.9 ANEMIA, UNSPECIFIED: ICD-10-CM

## 2018-03-15 LAB
ANION GAP SERPL CALC-SCNC: 16 MMOL/L — SIGNIFICANT CHANGE UP (ref 5–17)
BUN SERPL-MCNC: 70 MG/DL — HIGH (ref 8–20)
CALCIUM SERPL-MCNC: 8.5 MG/DL — LOW (ref 8.6–10.2)
CHLORIDE SERPL-SCNC: 95 MMOL/L — LOW (ref 98–107)
CO2 SERPL-SCNC: 21 MMOL/L — LOW (ref 22–29)
CREAT SERPL-MCNC: 6.13 MG/DL — HIGH (ref 0.5–1.3)
GLUCOSE BLDC GLUCOMTR-MCNC: 126 MG/DL — HIGH (ref 70–99)
GLUCOSE BLDC GLUCOMTR-MCNC: 126 MG/DL — HIGH (ref 70–99)
GLUCOSE BLDC GLUCOMTR-MCNC: 168 MG/DL — HIGH (ref 70–99)
GLUCOSE BLDC GLUCOMTR-MCNC: 92 MG/DL — SIGNIFICANT CHANGE UP (ref 70–99)
GLUCOSE SERPL-MCNC: 93 MG/DL — SIGNIFICANT CHANGE UP (ref 70–115)
HCT VFR BLD CALC: 26.2 % — LOW (ref 37–47)
HGB BLD-MCNC: 8.8 G/DL — LOW (ref 12–16)
MCHC RBC-ENTMCNC: 28.4 PG — SIGNIFICANT CHANGE UP (ref 27–31)
MCHC RBC-ENTMCNC: 33.6 G/DL — SIGNIFICANT CHANGE UP (ref 32–36)
MCV RBC AUTO: 84.5 FL — SIGNIFICANT CHANGE UP (ref 81–99)
PHOSPHATE SERPL-MCNC: 6.5 MG/DL — HIGH (ref 2.4–4.7)
PLATELET # BLD AUTO: 504 K/UL — HIGH (ref 150–400)
POTASSIUM SERPL-MCNC: 5.1 MMOL/L — SIGNIFICANT CHANGE UP (ref 3.5–5.3)
POTASSIUM SERPL-SCNC: 5.1 MMOL/L — SIGNIFICANT CHANGE UP (ref 3.5–5.3)
RBC # BLD: 3.1 M/UL — LOW (ref 4.4–5.2)
RBC # FLD: 13 % — SIGNIFICANT CHANGE UP (ref 11–15.6)
SODIUM SERPL-SCNC: 132 MMOL/L — LOW (ref 135–145)
WBC # BLD: 7.9 K/UL — SIGNIFICANT CHANGE UP (ref 4.8–10.8)
WBC # FLD AUTO: 7.9 K/UL — SIGNIFICANT CHANGE UP (ref 4.8–10.8)

## 2018-03-15 PROCEDURE — 99232 SBSQ HOSP IP/OBS MODERATE 35: CPT

## 2018-03-15 PROCEDURE — 99233 SBSQ HOSP IP/OBS HIGH 50: CPT | Mod: GC

## 2018-03-15 PROCEDURE — 76937 US GUIDE VASCULAR ACCESS: CPT | Mod: 26

## 2018-03-15 PROCEDURE — 36556 INSERT NON-TUNNEL CV CATH: CPT

## 2018-03-15 PROCEDURE — 93010 ELECTROCARDIOGRAM REPORT: CPT | Mod: 76

## 2018-03-15 PROCEDURE — 99233 SBSQ HOSP IP/OBS HIGH 50: CPT

## 2018-03-15 RX ORDER — ERYTHROPOIETIN 10000 [IU]/ML
10000 INJECTION, SOLUTION INTRAVENOUS; SUBCUTANEOUS ONCE
Qty: 0 | Refills: 0 | Status: COMPLETED | OUTPATIENT
Start: 2018-03-15 | End: 2018-03-15

## 2018-03-15 RX ORDER — TUBERCULIN PURIFIED PROTEIN DERIVATIVE 5 [IU]/.1ML
5 INJECTION, SOLUTION INTRADERMAL ONCE
Qty: 0 | Refills: 0 | Status: COMPLETED | OUTPATIENT
Start: 2018-03-15 | End: 2018-03-15

## 2018-03-15 RX ORDER — COLCHICINE 0.6 MG
0.6 TABLET ORAL
Qty: 0 | Refills: 0 | Status: DISCONTINUED | OUTPATIENT
Start: 2018-03-15 | End: 2018-03-28

## 2018-03-15 RX ADMIN — PANTOPRAZOLE SODIUM 40 MILLIGRAM(S): 20 TABLET, DELAYED RELEASE ORAL at 19:26

## 2018-03-15 RX ADMIN — Medication 0.1 MILLIGRAM(S): at 18:25

## 2018-03-15 RX ADMIN — Medication 1 DROP(S): at 18:26

## 2018-03-15 RX ADMIN — Medication 40 MILLIGRAM(S): at 05:56

## 2018-03-15 RX ADMIN — IRON SUCROSE 220 MILLIGRAM(S): 20 INJECTION, SOLUTION INTRAVENOUS at 12:27

## 2018-03-15 RX ADMIN — ERYTHROPOIETIN 10000 UNIT(S): 10000 INJECTION, SOLUTION INTRAVENOUS; SUBCUTANEOUS at 12:27

## 2018-03-15 RX ADMIN — PANTOPRAZOLE SODIUM 40 MILLIGRAM(S): 20 TABLET, DELAYED RELEASE ORAL at 05:56

## 2018-03-15 RX ADMIN — Medication 1 DROP(S): at 05:56

## 2018-03-15 RX ADMIN — SIMVASTATIN 40 MILLIGRAM(S): 20 TABLET, FILM COATED ORAL at 21:18

## 2018-03-15 RX ADMIN — TUBERCULIN PURIFIED PROTEIN DERIVATIVE 5 UNIT(S): 5 INJECTION, SOLUTION INTRADERMAL at 18:26

## 2018-03-15 RX ADMIN — Medication 25 MILLIGRAM(S): at 21:19

## 2018-03-15 RX ADMIN — Medication 650 MILLIGRAM(S): at 18:26

## 2018-03-15 RX ADMIN — Medication 0.1 MILLIGRAM(S): at 05:56

## 2018-03-15 RX ADMIN — Medication 25 MILLIGRAM(S): at 05:56

## 2018-03-15 RX ADMIN — Medication 0.6 MILLIGRAM(S): at 18:25

## 2018-03-15 NOTE — PROGRESS NOTE ADULT - PROBLEM SELECTOR PLAN 4
Pleuritic and positional chest pain, with moderate pleural effusion, elevated troponins  -Tylenol PRN pain  -EKG NSR with nonspecific T wave inversion  -will trend serial troponin could also be elevated due to ARF   -recent outpatient stress test WNL in January 2018  -no asa given current concern for bleeding   -Cardio consult noted and appreciated and d/w Dr. Salazar the plan of care. Could be secondary to epigastric pain from vomiting/ pericarditis will hold off on tx given ARF -bp now better controlled   -c/w clonidine and will up titrate as needed  -c/w hydralazine 25mg po q8hrs   -holding ace inhibitor due to arf

## 2018-03-15 NOTE — PROCEDURE NOTE - NSPOSTCAREGUIDE_GEN_A_CORE
Instructed patient/caregiver regarding signs and symptoms of infection/Verbal/written post procedure instructions were given to patient/caregiver/Keep the cast/splint/dressing clean and dry/Care for catheter as per unit/ICU protocols

## 2018-03-15 NOTE — PROGRESS NOTE ADULT - ASSESSMENT
56 yo F with PMH s/f HTN/HLD, DMII, hyperkalemia, ckd 3 who presents with 3 weeks of postprandial vomiting and abd pain, noted to have ARF, fluid overload with bilateral pleural effusions and pericardial effsuion as well as symptomatic anemia likely multifactorial secondary to acute on chronic renal failure/ KULWANT and suspected upper GI Bleeding from vomiting/retching and NSAID use. Clinical concern that worsening acute on chronic renal failure possibly caused uremia related sx of N/V, with patients NSAID use and retching suspected GI bleed. Thereafter with epigastric and chest pain, worsening renal function causing fluid overload with b/l pleural and pericardial effusion. Pt actively being diuresed. Cardiology, gastroenterology and nephrology consulted. 56 yo F with PMH s/f HTN/HLD, DMII, hyperkalemia, ckd 3 who presents with 3 weeks of postprandial vomiting and abd pain, noted to have ARF, fluid overload with bilateral pleural effusions and pericardial effsuion as well as symptomatic anemia likely multifactorial secondary to acute on chronic renal failure/ KULWANT and suspected upper GI Bleeding from vomiting/retching and NSAID use. Clinical concern that worsening acute on chronic renal failure possibly caused uremia related sx of N/V, with patients NSAID use and retching suspected GI bleed. Thereafter with epigastric and chest pain, worsening renal function causing fluid overload with b/l pleural and pericardial effusion. Pt actively being diuresed, pericardial rub noted, indication for hemodialysis 56 yo F with PMH s/f HTN/HLD, DMII, hyperkalemia, ckd 3 who presents with 3 weeks of postprandial vomiting and abd pain, noted to have ARF, fluid overload with bilateral pleural effusions and pericardial effsuion as well as symptomatic anemia likely multifactorial secondary to acute on chronic renal failure/ KULWANT and suspected upper GI Bleeding from vomiting/retching and NSAID use. Clinical concern that worsening acute on chronic renal failure possibly caused uremia related sx of N/V, with patients NSAID use and retching suspected GI bleed but occult blood returned negative, for dropping h/h pt received 2 u prbc on admission with appropriate response to therapy. Thereafter with epigastric and chest pain, worsening renal function causing fluid overload with b/l pleural and pericardial effusion. Pt was actively being diuresed, today noted with new pericardial rub and patient being sent for emergent hemodialysis. Vascular consulted for access. 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 Nephrology/ cardiology and GI continue to follow the patient.  TTE returned with preserved EF but moderate pericardial effusion, pt also empirically placed on colchicine. 56 yo F with PMH s/f HTN/HLD, DMII, hyperkalemia, ckd 3 who presents with 3 weeks of postprandial vomiting and abd pain, noted to have ARF, fluid overload with bilateral pleural effusions and pericardial effsuion as well as symptomatic anemia likely multifactorial secondary to acute on chronic renal failure/ KULWANT and suspected upper GI Bleeding from vomiting/retching and NSAID use. Clinical concern that worsening acute on chronic renal failure possibly caused uremia related sx of N/V, with patients NSAID use and retching suspected GI bleed but occult blood returned negative, for dropping h/h pt received 2 u prbc on admission with appropriate response to therapy. Thereafter with epigastric and chest pain, worsening renal function causing fluid overload with b/l pleural and pericardial effusion. Pt was actively being diuresed, today noted with new pericardial rub and patient being sent for urgent hemodialysis. Vascular consulted for access. 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 Nephrology/ cardiology and GI continue to follow the patient.  TTE returned with preserved EF but moderate pericardial effusion, pt also empirically placed on colchicine.

## 2018-03-15 NOTE — PROGRESS NOTE ADULT - SUBJECTIVE AND OBJECTIVE BOX
Patient is a 57y old  Female who presents with a chief complaint of dark stools (14 Mar 2018 02:54)      INTERVAL HPI/OVERNIGHT EVENTS:  57y  Female seen and examined at bedside. Patient states she was able to sleep, still feels nausea with abdominal pain as well as tactile fever/chills. States she had no further vomiting overnight but requested zofran this morning for nausea, also still has sob and left sided chest pain; otherwise ROS unremarkable.       Allergies    No Known Allergies    Intolerances        Vital Signs Last 24 Hrs  T(C): 36.4 (14 Mar 2018 07:19), Max: 37 (13 Mar 2018 23:05)  T(F): 97.6 (14 Mar 2018 07:19), Max: 98.6 (13 Mar 2018 23:05)  HR: 91 (14 Mar 2018 07:19) (77 - 91)  BP: 168/85 (14 Mar 2018 07:19) (143/83 - 184/93)  BP(mean): --  RR: 18 (14 Mar 2018 07:19) (18 - 23)  SpO2: 93% (14 Mar 2018 07:19) (93% - 98%)      Daily Height in cm: 157.48 (13 Mar 2018 19:13)    I/O not recorded          PHYSICAL EXAM:    	Constitutional: NAD, laying in bed, pale-appearing  	HEENT: PERRLA, EOMI ; conjunctival pallor  	Neck: No JVD, supple  	Back: Normal spine flexure, No CVA tenderness  	Respiratory: +inspiratory and expiratory crackles in lower lobes b/l, R>L, decreased breath sounds at bases ; no wheezing, rhonchi  	Cardiovascular: S1 and S2, RRR, no m/r/g; Chest pain reproducible to palpation across sternum in mid-sternal region extending to left parasternal region, 2nd -4th costal cartilages  	Gastrointestinal: BS+ normoactive, soft, ND, NTTP   	Extremities: +2 pitting edema b/l up to knees  	Vascular: 2+ peripheral pulses; cap refill >2 sec, pallor to extremities and nailbeds  	Neurological: AAO x 3, no focal deficits  	Psychiatric: Normal mood, normal affect  	Musculoskeletal: 5/5 strength b/l upper and lower extremities  Skin: No rashes, no diaphoresis    LABS:                        7.0    7.9   )-----------( 505      ( 13 Mar 2018 21:05 )             21.1     CBC Full  -  ( 13 Mar 2018 21:05 )  WBC Count : 7.9 K/uL  Hemoglobin : 7.0 g/dL  Hematocrit : 21.1 %  Platelet Count - Automated : 505 K/uL  Mean Cell Volume : 83.6 fl  Mean Cell Hemoglobin : 28.0 pg  Mean Cell Hemoglobin Concentration : 33.5 g/dL  Auto Neutrophil # : 7.2 K/uL  Auto Lymphocyte # : 0.5 K/uL  Auto Monocyte # : 0.5 K/uL  Auto Eosinophil # : 0.1 K/uL  Auto Basophil # : 0.0 K/uL  Auto Neutrophil % : 80.0 %  Auto Lymphocyte % : 10.0 %  Auto Monocyte % : 7.0 %  Auto Eosinophil % : 3.0 %  Auto Basophil % : 0.0 %        129<L>  |  92<L>  |  69.0<H>  ----------------------------<  235<H>  4.8   |  21.0<L>  |  5.79<H>    Ca    8.8      13 Mar 2018 21:05    TPro  6.5<L>  /  Alb  2.8<L>  /  TBili  <0.2<L>  /  DBili  x   /  AST  12  /  ALT  8   /  AlkPhos  93  13      Urinalysis Basic - ( 14 Mar 2018 03:01 )    Color: Yellow / Appearance: Clear / S.010 / pH: x  Gluc: x / Ketone: Negative  / Bili: Negative / Urobili: Negative mg/dL   Blood: x / Protein: 500 mg/dL / Nitrite: Negative   Leuk Esterase: Negative / RBC: 6-10 /HPF / WBC Negative   Sq Epi: x / Non Sq Epi: Few / Bacteria: x              Albumin, Serum: 2.8 g/dL ( @ 21:05)    LIVER FUNCTIONS - ( 13 Mar 2018 21:05 )  Alb: 2.8 g/dL / Pro: 6.5 g/dL / ALK PHOS: 93 U/L / ALT: 8 U/L / AST: 12 U/L / GGT: x             RADIOLOGY & ADDITIONAL TESTS: Patient is a 57y old  Female who presents with a chief complaint of dark stools (14 Mar 2018 02:54)    INTERVAL HPI/OVERNIGHT EVENTS:  57y  Female seen and examined at bedside. Patient states she was able to sleep, no further episodes of nausea, vomiting or hematemesis, hasn't had any bowel movements yet, reports improvement of her shortness of breath, denies headache, abdominal pain, lower extremity edema or other symptoms. Otherwise ROS unremarkable.     Vital Signs Last 24 Hrs  T(C): 36.7 (15 Mar 2018 05:51), Max: 36.9 (14 Mar 2018 16:30)  T(F): 98 (15 Mar 2018 05:51), Max: 98.4 (14 Mar 2018 16:30)  HR: 83 (15 Mar 2018 05:51) (64 - 86)  BP: 167/85 (15 Mar 2018 05:51) (140/84 - 167/85)  BP(mean): --  RR: 18 (15 Mar 2018 05:51) (18 - 18)  SpO2: 94% (15 Mar 2018 05:51) (94% - 98%)    PHYSICAL EXAM:    	Constitutional: NAD, laying in bed, pale-appearing  	HEENT: PERRLA, EOMI ; conjunctival pallor  	Neck: No JVD, supple  	Back: Normal spine flexure, No CVA tenderness  	Respiratory: Normal respiratory rate and effort, inspiratory and expiratory crackles in lower lobes b/l, decreased breath sounds at bases ; no wheezing, rhonchi  	Cardiovascular: S1 and S2, RRR, no m/r/g; Chest pain reproducible to palpation across sternum in mid-sternal region extending to left parasternal region, 2nd -4th costal cartilages, pericardial rub noted  	Gastrointestinal: BS+ normoactive, soft, ND, NTTP   	Extremities: +2 pitting edema b/l up to knees  	Vascular: 2+ peripheral pulses; cap refill >2 sec, pallor to extremities and nailbeds  	Neurological: AAO x 3, no focal deficits  	Psychiatric: Normal mood, normal affect  	Musculoskeletal: 5/5 strength b/l upper and lower extremities  Skin: No rashes, no diaphoresis

## 2018-03-15 NOTE — PROGRESS NOTE ADULT - PROBLEM SELECTOR PLAN 1
possibly due to renal failure. May have element of diabetic gastroparesis or PUD. Continue clear liquids for now and pantoprazole. Moniter H/H

## 2018-03-15 NOTE — PROGRESS NOTE ADULT - PROBLEM SELECTOR PLAN 6
-bp now better controlled   -c/w clonidine and will up titrate as needed  -c/w hydralzine 25mg po q8hrs   -holding ace inhibitor due to arf -bp now better controlled   -c/w clonidine and will up titrate as needed  -c/w hydralazine 25mg po q8hrs   -holding ace inhibitor due to arf No chemical ac given severe anemia   SCDs while in bed  Ambulate with assistance

## 2018-03-15 NOTE — PROGRESS NOTE ADULT - PROBLEM SELECTOR PLAN 1
baseline ckd 3 now in ARF with uremia sx and possible pericarditis also with sx of fluid overload  -BUN/Cr 69/5.79 worsened from baseline in Feb 2017 of 23/1.3  and a creatine of 3.7 on 3/9/18 recently   -c/w monitoring bun/cr   -f/up repeat UA  -strict I/O Q 6 hours  -diuresing lasix 40 iv bid x 2 days   -avoid nephrotoxic medications   -renal US noted with chronic medical renal dz no evidence of hydronephrosis   -nephro consult noted and appreciated and d/w Dr. Gay the above plan of care, if no improvement with diuresis pt will likely need dialysis baseline ckd 3 now in ARF with uremia sx and possible pericarditis also with sx of fluid overload  -BUN/Cr 69/5.79 worsened from baseline in Feb 2017 of 23/1.3  and a creatine of 3.7 on 3/9/18 recently   -strict I/O Q 6 hours  -avoid nephrotoxic medications   -renal US noted with chronic medical renal dz no evidence of hydronephrosis   -nephro consult noted and appreciated and d/w Dr. Gay, given pericardial rub, will receive hemodialysis today. baseline ckd 3 now in ARF with uremia sx, pericardial rub/ uremic pericarditis also with sx of fluid overload w/ bilateral pleural effusions   -Worsening bun/cr today    -pt clinically still sob   -c/w strict I/O    -daily weight   -s/p diuresis with lasix 40iv bid yest now discontinued   -avoid nephrotoxic medications   -renal US noted with chronic medical renal dz no evidence of hydronephrosis   -nephro f/u noted and appreciated and d/w Dr. Gay the plan of care and pt to go for emergent dialysis   -f/u acute hepatitis panel and ppd   -vascular consulted for access baseline ckd 3 now in ARF with uremia sx, new pericardial rub/ uremic pericarditis also with sx of fluid overload w/ bilateral pleural effusions   -Worsening bun/cr today    -pt clinically still sob   -c/w strict I/O    -daily weight   -s/p diuresis with lasix 40iv bid yest now discontinued   -hyponatremia is secondary to worsening renal function   -avoid nephrotoxic medications   -renal US noted with chronic medical renal dz no evidence of hydronephrosis   -nephro f/u noted and appreciated and d/w Dr. Gay the plan of care and pt to go for emergent dialysis   -f/u acute hepatitis panel and ppd, pt will need a HD seat in the community   -vascular consulted for access

## 2018-03-15 NOTE — PROGRESS NOTE ADULT - SUBJECTIVE AND OBJECTIVE BOX
Pt seen and examined f/u for recurrant postprandial abdominal pain, nausea, vomiting and anemia  This morning tolerating clear liquids without pain, nausea or vomiting. To be started on HD.    REVIEW OF SYSTEMS:    CONSTITUTIONAL: No fever, weight loss, or fatigue  EYES: No eye pain, visual disturbances, or discharge  ENMT:  No difficulty hearing, tinnitus, vertigo; No sinus or throat pain  RESPIRATORY: No cough, wheezing, chills or hemoptysis; No shortness of breath  CARDIOVASCULAR: No chest pain, palpitations, dizziness, or leg swelling  GASTROINTESTINAL: No abdominal or epigastric pain. No nausea, vomiting, or hematemesis; No diarrhea or constipation. No melena or hematochezia.    MEDICATIONS:  MEDICATIONS  (STANDING):  artificial  tears Solution 1 Drop(s) Both EYES every 12 hours  cloNIDine 0.1 milliGRAM(s) Oral two times a day  colchicine 0.6 milliGRAM(s) Oral two times a day  dextrose 5%. 1000 milliLiter(s) (50 mL/Hr) IV Continuous <Continuous>  dextrose 50% Injectable 12.5 Gram(s) IV Push once  dextrose 50% Injectable 25 Gram(s) IV Push once  dextrose 50% Injectable 25 Gram(s) IV Push once  epoetin gian Injectable 82058 Unit(s) IV Push once  furosemide   Injectable 40 milliGRAM(s) IV Push two times a day  hydrALAZINE 25 milliGRAM(s) Oral every 8 hours  insulin lispro (HumaLOG) corrective regimen sliding scale   SubCutaneous three times a day before meals  insulin lispro (HumaLOG) corrective regimen sliding scale   SubCutaneous at bedtime  iron sucrose IVPB 200 milliGRAM(s) IV Intermittent daily  pantoprazole    Tablet 40 milliGRAM(s) Oral two times a day  simvastatin 40 milliGRAM(s) Oral at bedtime    MEDICATIONS  (PRN):  acetaminophen   Tablet 650 milliGRAM(s) Oral every 6 hours PRN For Temp greater than 38 C (100.4 F)  acetaminophen   Tablet. 650 milliGRAM(s) Oral every 6 hours PRN Moderate Pain (4 - 6)  dextrose Gel 1 Dose(s) Oral once PRN Blood Glucose LESS THAN 70 milliGRAM(s)/deciliter  glucagon  Injectable 1 milliGRAM(s) IntraMuscular once PRN Glucose LESS THAN 70 milligrams/deciliter  ondansetron Injectable 4 milliGRAM(s) IV Push every 6 hours PRN Nausea      Allergies    No Known Allergies    Intolerances        Vital Signs Last 24 Hrs  T(C): 36.7 (15 Mar 2018 05:51), Max: 36.9 (14 Mar 2018 11:53)  T(F): 98 (15 Mar 2018 05:51), Max: 98.5 (14 Mar 2018 11:53)  HR: 83 (15 Mar 2018 05:51) (64 - 86)  BP: 167/85 (15 Mar 2018 05:51) (140/84 - 167/85)  BP(mean): --  RR: 18 (15 Mar 2018 05:51) (18 - 18)  SpO2: 94% (15 Mar 2018 05:51) (93% - 98%)      PHYSICAL EXAM:    General: Well developed; well nourished; in no acute distress  HEENT: MMM, conjunctiva and sclera clear  Gastrointestinal:Abdomen: Soft non-tender non-distended; Normal bowel sounds; No hepatosplenomegaly  Extremities: no cyanosis, clubbing or edema.  Skin: Warm and dry. No obvious rash    LABS:  ABG - ( 14 Mar 2018 19:02 )  pH: 7.48  /  pCO2: 29    /  pO2: 65    / HCO3: 24    / Base Excess: -0.9  /  SaO2: 95                  CBC Full  -  ( 15 Mar 2018 06:11 )  WBC Count : 7.9 K/uL  Hemoglobin : 8.8 g/dL  Hematocrit : 26.2 %  Platelet Count - Automated : 504 K/uL  Mean Cell Volume : 84.5 fl  Mean Cell Hemoglobin : 28.4 pg  Mean Cell Hemoglobin Concentration : 33.6 g/dL  Auto Neutrophil # : x  Auto Lymphocyte # : x  Auto Monocyte # : x  Auto Eosinophil # : x  Auto Basophil # : x  Auto Neutrophil % : x  Auto Lymphocyte % : x  Auto Monocyte % : x  Auto Eosinophil % : x  Auto Basophil % : x    03-15    132<L>  |  95<L>  |  70.0<H>  ----------------------------<  93  5.1   |  21.0<L>  |  6.13<H>    Ca    8.5<L>      15 Mar 2018 06:11  Phos  6.5     03-15  Mg     2.1     03-14    TPro  6.6  /  Alb  2.7<L>  /  TBili  0.4  /  DBili  x   /  AST  12  /  ALT  8   /  AlkPhos  95  03-14    PT/INR - ( 14 Mar 2018 11:12 )   PT: 11.0 sec;   INR: 1.00 ratio         PTT - ( 14 Mar 2018 11:12 )  PTT:29.0 sec      Urinalysis Basic - ( 14 Mar 2018 03:01 )    Color: Yellow / Appearance: Clear / S.010 / pH: x  Gluc: x / Ketone: Negative  / Bili: Negative / Urobili: Negative mg/dL   Blood: x / Protein: 500 mg/dL / Nitrite: Negative   Leuk Esterase: Negative / RBC: 6-10 /HPF / WBC Negative   Sq Epi: x / Non Sq Epi: Few / Bacteria: x Pt seen and examined f/u for recurrant postprandial abdominal pain, nausea, vomiting and anemia  This morning tolerating clear liquids without pain, nausea or vomiting. To be started on HD. H/H stable    REVIEW OF SYSTEMS:    CONSTITUTIONAL: No fever, weight loss, or fatigue  EYES: No eye pain, visual disturbances, or discharge  ENMT:  No difficulty hearing, tinnitus, vertigo; No sinus or throat pain  RESPIRATORY: No cough, wheezing, chills or hemoptysis; No shortness of breath  CARDIOVASCULAR: No chest pain, palpitations, dizziness, or leg swelling  GASTROINTESTINAL: No abdominal or epigastric pain. No nausea, vomiting, or hematemesis; No diarrhea or constipation. No melena or hematochezia.    MEDICATIONS:  MEDICATIONS  (STANDING):  artificial  tears Solution 1 Drop(s) Both EYES every 12 hours  cloNIDine 0.1 milliGRAM(s) Oral two times a day  colchicine 0.6 milliGRAM(s) Oral two times a day  dextrose 5%. 1000 milliLiter(s) (50 mL/Hr) IV Continuous <Continuous>  dextrose 50% Injectable 12.5 Gram(s) IV Push once  dextrose 50% Injectable 25 Gram(s) IV Push once  dextrose 50% Injectable 25 Gram(s) IV Push once  epoetin gian Injectable 75721 Unit(s) IV Push once  furosemide   Injectable 40 milliGRAM(s) IV Push two times a day  hydrALAZINE 25 milliGRAM(s) Oral every 8 hours  insulin lispro (HumaLOG) corrective regimen sliding scale   SubCutaneous three times a day before meals  insulin lispro (HumaLOG) corrective regimen sliding scale   SubCutaneous at bedtime  iron sucrose IVPB 200 milliGRAM(s) IV Intermittent daily  pantoprazole    Tablet 40 milliGRAM(s) Oral two times a day  simvastatin 40 milliGRAM(s) Oral at bedtime    MEDICATIONS  (PRN):  acetaminophen   Tablet 650 milliGRAM(s) Oral every 6 hours PRN For Temp greater than 38 C (100.4 F)  acetaminophen   Tablet. 650 milliGRAM(s) Oral every 6 hours PRN Moderate Pain (4 - 6)  dextrose Gel 1 Dose(s) Oral once PRN Blood Glucose LESS THAN 70 milliGRAM(s)/deciliter  glucagon  Injectable 1 milliGRAM(s) IntraMuscular once PRN Glucose LESS THAN 70 milligrams/deciliter  ondansetron Injectable 4 milliGRAM(s) IV Push every 6 hours PRN Nausea      Allergies    No Known Allergies    Intolerances        Vital Signs Last 24 Hrs  T(C): 36.7 (15 Mar 2018 05:51), Max: 36.9 (14 Mar 2018 11:53)  T(F): 98 (15 Mar 2018 05:51), Max: 98.5 (14 Mar 2018 11:53)  HR: 83 (15 Mar 2018 05:51) (64 - 86)  BP: 167/85 (15 Mar 2018 05:51) (140/84 - 167/85)  BP(mean): --  RR: 18 (15 Mar 2018 05:51) (18 - 18)  SpO2: 94% (15 Mar 2018 05:51) (93% - 98%)      PHYSICAL EXAM:    General: Well developed; well nourished; in no acute distress but somewhat tachypneic  HEENT: MMM, conjunctiva and sclera clear  Gastrointestinal:Abdomen: Soft non-tender non-distended; Normal bowel sounds; No hepatosplenomegaly  Extremities: no cyanosis, clubbing or edema.  Skin: Warm and dry. No obvious rash    LABS:  ABG - ( 14 Mar 2018 19:02 )  pH: 7.48  /  pCO2: 29    /  pO2: 65    / HCO3: 24    / Base Excess: -0.9  /  SaO2: 95                  CBC Full  -  ( 15 Mar 2018 06:11 )  WBC Count : 7.9 K/uL  Hemoglobin : 8.8 g/dL  Hematocrit : 26.2 %  Platelet Count - Automated : 504 K/uL  Mean Cell Volume : 84.5 fl  Mean Cell Hemoglobin : 28.4 pg  Mean Cell Hemoglobin Concentration : 33.6 g/dL  Auto Neutrophil # : x  Auto Lymphocyte # : x  Auto Monocyte # : x  Auto Eosinophil # : x  Auto Basophil # : x  Auto Neutrophil % : x  Auto Lymphocyte % : x  Auto Monocyte % : x  Auto Eosinophil % : x  Auto Basophil % : x    03-15    132<L>  |  95<L>  |  70.0<H>  ----------------------------<  93  5.1   |  21.0<L>  |  6.13<H>    Ca    8.5<L>      15 Mar 2018 06:11  Phos  6.5     03-15  Mg     2.1     03-14    TPro  6.6  /  Alb  2.7<L>  /  TBili  0.4  /  DBili  x   /  AST  12  /  ALT  8   /  AlkPhos  95  03-14    PT/INR - ( 14 Mar 2018 11:12 )   PT: 11.0 sec;   INR: 1.00 ratio         PTT - ( 14 Mar 2018 11:12 )  PTT:29.0 sec      Urinalysis Basic - ( 14 Mar 2018 03:01 )    Color: Yellow / Appearance: Clear / S.010 / pH: x  Gluc: x / Ketone: Negative  / Bili: Negative / Urobili: Negative mg/dL   Blood: x / Protein: 500 mg/dL / Nitrite: Negative   Leuk Esterase: Negative / RBC: 6-10 /HPF / WBC Negative   Sq Epi: x / Non Sq Epi: Few / Bacteria: x

## 2018-03-15 NOTE — PROGRESS NOTE ADULT - SUBJECTIVE AND OBJECTIVE BOX
NEPHROLOGY INTERVAL HPI/OVERNIGHT EVENTS: No new events .    MEDICATIONS  (STANDING):  artificial  tears Solution 1 Drop(s) Both EYES every 12 hours  cloNIDine 0.1 milliGRAM(s) Oral two times a day  dextrose 5%. 1000 milliLiter(s) (50 mL/Hr) IV Continuous <Continuous>  dextrose 50% Injectable 12.5 Gram(s) IV Push once  dextrose 50% Injectable 25 Gram(s) IV Push once  dextrose 50% Injectable 25 Gram(s) IV Push once  furosemide   Injectable 40 milliGRAM(s) IV Push two times a day  hydrALAZINE 25 milliGRAM(s) Oral every 8 hours  insulin lispro (HumaLOG) corrective regimen sliding scale   SubCutaneous three times a day before meals  insulin lispro (HumaLOG) corrective regimen sliding scale   SubCutaneous at bedtime  iron sucrose IVPB 200 milliGRAM(s) IV Intermittent daily  pantoprazole    Tablet 40 milliGRAM(s) Oral two times a day  simvastatin 40 milliGRAM(s) Oral at bedtime    MEDICATIONS  (PRN):  acetaminophen   Tablet 650 milliGRAM(s) Oral every 6 hours PRN For Temp greater than 38 C (100.4 F)  acetaminophen   Tablet. 650 milliGRAM(s) Oral every 6 hours PRN Moderate Pain (4 - 6)  dextrose Gel 1 Dose(s) Oral once PRN Blood Glucose LESS THAN 70 milliGRAM(s)/deciliter  glucagon  Injectable 1 milliGRAM(s) IntraMuscular once PRN Glucose LESS THAN 70 milligrams/deciliter  ondansetron Injectable 4 milliGRAM(s) IV Push every 6 hours PRN Nausea      Allergies    No Known Allergies    Intolerances        Vital Signs Last 24 Hrs  T(C): 36.7 (15 Mar 2018 05:51), Max: 36.9 (14 Mar 2018 11:53)  T(F): 98 (15 Mar 2018 05:51), Max: 98.5 (14 Mar 2018 11:53)  HR: 83 (15 Mar 2018 05:51) (64 - 86)  BP: 167/85 (15 Mar 2018 05:51) (140/84 - 167/85)  BP(mean): --  RR: 18 (15 Mar 2018 05:51) (18 - 18)  SpO2: 94% (15 Mar 2018 05:51) (93% - 98%)  Daily     Daily Weight in k (15 Mar 2018 07:12)    PHYSICAL EXAM:    GENERAL: appears chronically ill  HEAD:  wnl  EYES: same  ENMT:   NECK: veins wnl  NERVOUS SYSTEM:  oriented, verbal  CHEST/LUNG: decreased bs bases  HEART: + rub noted this am  ABDOMEN: soft  EXTREMITIES: leg edema same    LYMPH:   SKIN: no rash   neg    LABS:                        8.8    7.9   )-----------( 504      ( 15 Mar 2018 06:11 )             26.2     03-15    132<L>  |  95<L>  |  70.0<H>  ----------------------------<  93  5.1   |  21.0<L>  |  6.13<H>    Ca    8.5<L>      15 Mar 2018 06:11  Phos  6.5     -15  Mg     2.1         TPro  6.6  /  Alb  2.7<L>  /  TBili  0.4  /  DBili  x   /  AST  12  /  ALT  8   /  AlkPhos  95  -    PT/INR - ( 14 Mar 2018 11:12 )   PT: 11.0 sec;   INR: 1.00 ratio         PTT - ( 14 Mar 2018 11:12 )  PTT:29.0 sec  Urinalysis Basic - ( 14 Mar 2018 03:01 )    Color: Yellow / Appearance: Clear / S.010 / pH: x  Gluc: x / Ketone: Negative  / Bili: Negative / Urobili: Negative mg/dL   Blood: x / Protein: 500 mg/dL / Nitrite: Negative   Leuk Esterase: Negative / RBC: 6-10 /HPF / WBC Negative   Sq Epi: x / Non Sq Epi: Few / Bacteria: x      Phosphorus Level, Serum: 6.5 mg/dL (03-15 @ 06:11)  Magnesium, Serum: 2.1 mg/dL ( @ 11:12)  Phosphorus Level, Serum: 5.3 mg/dL ( @ 11:12)    ABG - ( 14 Mar 2018 19:02 )  pH: 7.48  /  pCO2: 29    /  pO2: 65    / HCO3: 24    / Base Excess: -0.9  /  SaO2: 95                    RADIOLOGY & ADDITIONAL TESTS:

## 2018-03-15 NOTE — PROGRESS NOTE ADULT - PROBLEM SELECTOR PLAN 5
Postprandial nausea/vomiting with scant hematemesis  -Advance diet as tolerated  -IV fluids for dehydration  -zofran Q6 PRN nausea/vomiting  -c/t monitor vitals Postprandial nausea/vomiting with scant hematemesis  -Advance diet as tolerated  -zofran Q6 PRN nausea/vomiting  -c/t monitor vitals HBA1C: 7.6   fs stable  -c/w accuchecks achs tid   -c/w HSS  -c/w hypoglycemia protocol

## 2018-03-15 NOTE — PROGRESS NOTE ADULT - ATTENDING COMMENTS
Ethan MO called by me to get central line and will start dialysis - discussed with pt. orders  written.

## 2018-03-15 NOTE — PROGRESS NOTE ADULT - ASSESSMENT
Ms. Elizabeth is a 56 yo Frisian speaking female with hx of hypertension, hyperlipidemia, type 2 diabetes, with 1 month hx of vomiting and abdominal pain, with NSAID use.  Developed pleuritic chest pain, worsened in supine position and with deep breaths.  Also noted to be anemic.    1. abdominal pain/nausea/vomiting- being evaluated by GI, PPI, clear liquids  2. chest pain -  Her troponin is elevated, but CK is normal.  This is not consistent with acute coronary syndrome.  May have had demand ischemia in setting of blood loss anemia.  Probable underlying gastritis with concomitant pericarditis (pericardial rub, etc.).   Now on colchicine. Keep Hb > 9.  Monitor H& H.  Reviewed echo there are no regional wall motion abnormalities.  Would prioritize GI workup.  Will need eventual ischemia evaluation.  PPI  3. anemia - probably chronic or subacute .  GI workup.  CKD  Keep Hb > 9  4. hypertension   5. pleural effusions/pericardial effusions - will evaluate for HFPEF, ? exudative in nature.  Consider diagnostic thoracentesis.  6. acute on chronic renal insufficiency -   for HD today    Thank you for allowing me to participate in your patient's care.  Please call with questions.   D/W Dr. Agee

## 2018-03-15 NOTE — PROGRESS NOTE ADULT - PROBLEM SELECTOR PLAN 2
Bilateral pleural effusions and pericardial effusion, noted tte with preserved ef less likely HF related concern for fluid status being secondary to ARF   -c/w diuresis with lasix 40 mg IV BID   -tte shows preserved EF and moderate pericardiac effusion unclear if this is related to pericarditis   -c/w supplemental o2 abg noted with low pao2 will increase oxygen supply from NC to venti mask Bilateral pleural effusions and pericardial effusion, noted tte with preserved ef less likely HF related concern for fluid status being secondary to ARF    -tte shows preserved EF and moderate pericardiac effusion  -c/w supplemental o2 abg  - Hemodialysis today 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 given pericardial rub on exam   -c/w supplemental o2   -c/w colchicine 0.6mg po bid   - Hemodialysis today  -cardio f/u noted and appreciated  -nephro f/u noted and appreciated

## 2018-03-15 NOTE — PROGRESS NOTE ADULT - SUBJECTIVE AND OBJECTIVE BOX
[facility]                                                        Office: [address] [place] [address]                                                       Telephone: [phone]. Fax:[phone]      CARDIOLOGY PROGRESS NOTE   ([facility])    Subjective:   CURRENT MEDICATIONS  cloNIDine 0.1 milliGRAM(s) Oral two times a day  furosemide   Injectable 40 milliGRAM(s) IV Push two times a day  hydrALAZINE 25 milliGRAM(s) Oral every 8 hours        acetaminophen   Tablet 650 milliGRAM(s) Oral every 6 hours PRN  acetaminophen   Tablet. 650 milliGRAM(s) Oral every 6 hours PRN  ondansetron Injectable 4 milliGRAM(s) IV Push every 6 hours PRN    pantoprazole    Tablet 40 milliGRAM(s) Oral two times a day    colchicine 0.6 milliGRAM(s) Oral two times a day  dextrose 50% Injectable 12.5 Gram(s) IV Push once  dextrose 50% Injectable 25 Gram(s) IV Push once  dextrose 50% Injectable 25 Gram(s) IV Push once  dextrose Gel 1 Dose(s) Oral once PRN  glucagon  Injectable 1 milliGRAM(s) IntraMuscular once PRN  insulin lispro (HumaLOG) corrective regimen sliding scale   SubCutaneous three times a day before meals  insulin lispro (HumaLOG) corrective regimen sliding scale   SubCutaneous at bedtime  simvastatin 40 milliGRAM(s) Oral at bedtime    artificial  tears Solution 1 Drop(s) Both EYES every 12 hours  dextrose 5%. 1000 milliLiter(s) IV Continuous <Continuous>  epoetin gian Injectable 30697 Unit(s) IV Push once  iron sucrose IVPB 200 milliGRAM(s) IV Intermittent daily    	  TELEMETRY:   Vitals:  T(C): 36.7 (03-15-18 @ 05:51), Max: 36.9 (03-14-18 @ 16:30)  HR: 83 (03-15-18 @ 05:51) (64 - 86)  BP: 167/85 (03-15-18 @ 05:51) (140/84 - 167/85)  RR: 18 (03-15-18 @ 05:51) (18 - 18)  SpO2: 94% (03-15-18 @ 05:51) (94% - 98%)  Wt(kg): --  I&O's Summary      Daily T(C): 36.7 (03-15-18 @ 05:51), Max: 36.9 (03-14-18 @ 16:30)  HR: 83 (03-15-18 @ 05:51) (64 - 86)  BP: 167/85 (03-15-18 @ 05:51) (140/84 - 167/85)  RR: 18 (03-15-18 @ 05:51) (18 - 18)  SpO2: 94% (03-15-18 @ 05:51) (94% - 98%)  Wt(kg): --    Daily     PHYSICAL EXAM:  Appearance: Normal	  HEENT:   Normal oral mucosa, PERRL, EOMI	  Lymphatic: No lymphadenopathy  Cardiovascular: Normal S1 S2, No JVD, No murmurs, No edema  Respiratory: Lungs clear to auscultation	  Psychiatry: A & O x 3, Mood & affect appropriate  Gastrointestinal:  Soft, Non-tender, + BS	  Skin: No rashes, No ecchymoses, No cyanosis  Neurologic: Non-focal  Extremities: Normal range of motion, No clubbing, cyanosis or edema  Vascular: Peripheral pulses palpable 2+ bilaterally      ECG (tracing reviewed by me):  	    DIAGNOSTIC TESTING:  Echocardiogram (images reviewed by me):  Catheterization:  Stress Test:    CXR (image reviewed by me):  OTHER: 	    LABS:	 	  CARDIAC MARKERS:                                  8.8    7.9   )-----------( 504      ( 15 Mar 2018 06:11 )             26.2     03-15    132<L>  |  95<L>  |  70.0<H>  ----------------------------<  93  5.1   |  21.0<L>  |  6.13<H>    Ca    8.5<L>      15 Mar 2018 06:11  Phos  6.5     03-15  Mg     2.1     03-14    TPro  6.6  /  Alb  2.7<L>  /  TBili  0.4  /  DBili  x   /  AST  12  /  ALT  8   /  AlkPhos  95  03-14    BNP:   Lipid Profile:   HgA1c:   TSH: [facility]                                                        Office: [address] [address]                                                       Telephone: [phone]. Fax:[phone]      CARDIOLOGY PROGRESS NOTE   ([facility])    Subjective: Patient seen and examined at bedside. No further chest pain or vomiting. Used [employer]     CURRENT MEDICATIONS  cloNIDine 0.1 milliGRAM(s) Oral two times a day  furosemide   Injectable 40 milliGRAM(s) IV Push two times a day  hydrALAZINE 25 milliGRAM(s) Oral every 8 hours  acetaminophen   Tablet 650 milliGRAM(s) Oral every 6 hours PRN  acetaminophen   Tablet. 650 milliGRAM(s) Oral every 6 hours PRN  ondansetron Injectable 4 milliGRAM(s) IV Push every 6 hours PRN  pantoprazole    Tablet 40 milliGRAM(s) Oral two times a day  colchicine 0.6 milliGRAM(s) Oral two times a day  dextrose 50% Injectable 12.5 Gram(s) IV Push once  dextrose 50% Injectable 25 Gram(s) IV Push once  dextrose 50% Injectable 25 Gram(s) IV Push once  dextrose Gel 1 Dose(s) Oral once PRN  glucagon  Injectable 1 milliGRAM(s) IntraMuscular once PRN  insulin lispro (HumaLOG) corrective regimen sliding scale   SubCutaneous three times a day before meals  insulin lispro (HumaLOG) corrective regimen sliding scale   SubCutaneous at bedtime  simvastatin 40 milliGRAM(s) Oral at bedtime  artificial  tears Solution 1 Drop(s) Both EYES every 12 hours  dextrose 5%. 1000 milliLiter(s) IV Continuous <Continuous>  epoetin gian Injectable 24710 Unit(s) IV Push once  iron sucrose IVPB 200 milliGRAM(s) IV Intermittent daily  	  TELEMETRY: SR, PSVT  Vitals:  T(C): 36.7 (03-15-18 @ 05:51), Max: 36.9 (03-14-18 @ 16:30)  HR: 83 (03-15-18 @ 05:51) (64 - 86)  BP: 167/85 (03-15-18 @ 05:51) (140/84 - 167/85)  RR: 18 (03-15-18 @ 05:51) (18 - 18)  SpO2: 94% (03-15-18 @ 05:51) (94% - 98%)  Wt(kg): --  I&O's Summary      Daily T(C): 36.7 (03-15-18 @ 05:51), Max: 36.9 (03-14-18 @ 16:30)  HR: 83 (03-15-18 @ 05:51) (64 - 86)  BP: 167/85 (03-15-18 @ 05:51) (140/84 - 167/85)  RR: 18 (03-15-18 @ 05:51) (18 - 18)  SpO2: 94% (03-15-18 @ 05:51) (94% - 98%)  Wt(kg): --    Daily     PHYSICAL EXAM:  Appearance: Normal	  HEENT:   Normal oral mucosa, PERRL, EOMI	  Lymphatic: No lymphadenopathy  Cardiovascular: Normal S1 S2, No JVD, No murmurs, No edema  Respiratory: Lungs clear to auscultation	  Psychiatry: A & O x 3, Mood & affect appropriate  Gastrointestinal:  Soft, Non-tender, + BS	  Skin: No rashes, No ecchymoses, No cyanosis  Neurologic: Non-focal  Extremities: Normal range of motion, No clubbing, cyanosis or edema  Vascular: Peripheral pulses palpable 2+ bilaterally      ECG (tracing reviewed by me):  	    DIAGNOSTIC TESTING:  Echocardiogram (images reviewed by me):  Catheterization:  Stress Test:    CXR (image reviewed by me):  OTHER: 	    LABS:	 	  CARDIAC MARKERS:                                  8.8    7.9   )-----------( 504      ( 15 Mar 2018 06:11 )             26.2     03-15    132<L>  |  95<L>  |  70.0<H>  ----------------------------<  93  5.1   |  21.0<L>  |  6.13<H>    Ca    8.5<L>      15 Mar 2018 06:11  Phos  6.5     03-15  Mg     2.1     03-14    TPro  6.6  /  Alb  2.7<L>  /  TBili  0.4  /  DBili  x   /  AST  12  /  ALT  8   /  AlkPhos  95  03-14    BNP:   Lipid Profile:   HgA1c:   TSH: [facility]                                                        Office: [address] [address]                                                       Telephone: [phone]. Fax:[phone]      CARDIOLOGY PROGRESS NOTE   ([facility])    Subjective: Patient seen and examined at bedside. No further chest pain or vomiting. Used [employer]. About to have HD.      CURRENT MEDICATIONS  cloNIDine 0.1 milliGRAM(s) Oral two times a day  furosemide   Injectable 40 milliGRAM(s) IV Push two times a day  hydrALAZINE 25 milliGRAM(s) Oral every 8 hours  acetaminophen   Tablet 650 milliGRAM(s) Oral every 6 hours PRN  acetaminophen   Tablet. 650 milliGRAM(s) Oral every 6 hours PRN  ondansetron Injectable 4 milliGRAM(s) IV Push every 6 hours PRN  pantoprazole    Tablet 40 milliGRAM(s) Oral two times a day  colchicine 0.6 milliGRAM(s) Oral two times a day  dextrose 50% Injectable 12.5 Gram(s) IV Push once  dextrose 50% Injectable 25 Gram(s) IV Push once  dextrose 50% Injectable 25 Gram(s) IV Push once  dextrose Gel 1 Dose(s) Oral once PRN  glucagon  Injectable 1 milliGRAM(s) IntraMuscular once PRN  insulin lispro (HumaLOG) corrective regimen sliding scale   SubCutaneous three times a day before meals  insulin lispro (HumaLOG) corrective regimen sliding scale   SubCutaneous at bedtime  simvastatin 40 milliGRAM(s) Oral at bedtime  artificial  tears Solution 1 Drop(s) Both EYES every 12 hours  dextrose 5%. 1000 milliLiter(s) IV Continuous <Continuous>  epoetin gian Injectable 93076 Unit(s) IV Push once  iron sucrose IVPB 200 milliGRAM(s) IV Intermittent daily  	  TELEMETRY: SR, PSVT  Vitals:  T(C): 36.7 (03-15-18 @ 05:51), Max: 36.9 (03-14-18 @ 16:30)  HR: 83 (03-15-18 @ 05:51) (64 - 86)  BP: 167/85 (03-15-18 @ 05:51) (140/84 - 167/85)  RR: 18 (03-15-18 @ 05:51) (18 - 18)  SpO2: 94% (03-15-18 @ 05:51) (94% - 98%)    PHYSICAL EXAM:  Appearance: Normal, NAD	  HEENT:   Normal oral mucosa, PERRL, EOMI	  Lymphatic: No lymphadenopathy  Cardiovascular: Normal S1 S2, No JVD, No murmurs, No edema + pericardial rub  Respiratory: Lungs clear to auscultation	  Psychiatry: A & O x 3, Mood & affect appropriate  Gastrointestinal:  Soft, Non-tender, + BS	  Skin: No rashes, No ecchymoses, No cyanosis  Neurologic: Non-focal  Extremities: Normal range of motion, No clubbing, cyanosis or edema  Vascular: Peripheral pulses palpable 2+ bilaterally      DIAGNOSTIC TESTING:  Echocardiogram (images reviewed by me): 3/14/18 TTE  Summary:   1. Left ventricular ejection fraction, by visual estimation, is 60 to   65%.   2. Normal global left ventricular systolic function.   3. Spectral Doppler shows impaired relaxation pattern of left   ventricular myocardial filling (Grade I diastolic dysfunction).   4. Normal right ventricular size and function.   5. Moderate pericardial effusion.   6. Moderate pleural effusion in both left and right lateral regions.   7. Mild mitral valve regurgitation.   8. Sclerotic aortic valve with normal opening.   9. Questionable llambl's visualized on the aortic valve.  10. Mildly dilated pulmonary artery.                          8.8    7.9   )-----------( 504      ( 15 Mar 2018 06:11 )             26.2     03-15    132<L>  |  95<L>  |  70.0<H>  ----------------------------<  93  5.1   |  21.0<L>  |  6.13<H>    Ca    8.5<L>      15 Mar 2018 06:11  Phos  6.5     03-15  Mg     2.1     03-14    TPro  6.6  /  Alb  2.7<L>  /  TBili  0.4  /  DBili  x   /  AST  12  /  ALT  8   /  AlkPhos  95  03-14

## 2018-03-15 NOTE — PROGRESS NOTE ADULT - PROBLEM SELECTOR PLAN 3
due to worsening acute on chronic renal insufficiency in the setting of CKD vs suspected GI bleed  -visible pallor, worsening sx of sob   -s/p 2 units pRBC  -last colonoscopy /EGD 2017 WNL as per patient  -occult stool neg  -will c/w monitoring h/h   -anemia panel noted with degree iron deficiency   -Protonix 40 po bid changed from IV QD   -c/w iron sucrose  -pt could benefit from epogen   -GI consulted and recommended conservative management at this time likely multifactorial due to worsening acute on chronic renal insufficiency in the setting of CKD,  suspected GI bleed (initially with coffee ground emesis) but occult stool negative and degree of AOCD/ iron deficiency   -s/p 2 units pRBC with improvement in h/h   -last colonoscopy /EGD 2017 WNL as per patient  -occult stool neg  -will c/w monitoring h/h   -anemia panel noted with degree iron deficiency   -c/w ppi po bid   -d/c iron sucrose and will start ferrous sulfate 325mg po qd   -pt could benefit from epogen   -GI f/u noted and appreciated and recommended conservative management at this time and leave on clear liquid diet

## 2018-03-16 ENCOUNTER — TRANSCRIPTION ENCOUNTER (OUTPATIENT)
Age: 58
End: 2018-03-16

## 2018-03-16 DIAGNOSIS — I48.91 UNSPECIFIED ATRIAL FIBRILLATION: ICD-10-CM

## 2018-03-16 LAB
ANION GAP SERPL CALC-SCNC: 13 MMOL/L — SIGNIFICANT CHANGE UP (ref 5–17)
BUN SERPL-MCNC: 42 MG/DL — HIGH (ref 8–20)
C3 SERPL-MCNC: 128 MG/DL — SIGNIFICANT CHANGE UP (ref 80–180)
C4 SERPL-MCNC: 40 MG/DL — SIGNIFICANT CHANGE UP (ref 10–45)
CALCIUM SERPL-MCNC: 8.4 MG/DL — LOW (ref 8.6–10.2)
CHLORIDE SERPL-SCNC: 96 MMOL/L — LOW (ref 98–107)
CO2 SERPL-SCNC: 24 MMOL/L — SIGNIFICANT CHANGE UP (ref 22–29)
CREAT SERPL-MCNC: 4.41 MG/DL — HIGH (ref 0.5–1.3)
GLUCOSE BLDC GLUCOMTR-MCNC: 105 MG/DL — HIGH (ref 70–99)
GLUCOSE BLDC GLUCOMTR-MCNC: 146 MG/DL — HIGH (ref 70–99)
GLUCOSE BLDC GLUCOMTR-MCNC: 171 MG/DL — HIGH (ref 70–99)
GLUCOSE BLDC GLUCOMTR-MCNC: 188 MG/DL — HIGH (ref 70–99)
GLUCOSE SERPL-MCNC: 130 MG/DL — HIGH (ref 70–115)
HAV IGM SER-ACNC: SIGNIFICANT CHANGE UP
HBV CORE AB SER-ACNC: SIGNIFICANT CHANGE UP
HBV CORE IGM SER-ACNC: SIGNIFICANT CHANGE UP
HBV CORE IGM SER-ACNC: SIGNIFICANT CHANGE UP
HBV SURFACE AB SER-ACNC: <3 MIU/ML — LOW
HBV SURFACE AG SER-ACNC: SIGNIFICANT CHANGE UP
HBV SURFACE AG SER-ACNC: SIGNIFICANT CHANGE UP
HCT VFR BLD CALC: 25.5 % — LOW (ref 37–47)
HCV AB S/CO SERPL IA: 0.11 S/CO — SIGNIFICANT CHANGE UP
HCV AB S/CO SERPL IA: 0.11 S/CO — SIGNIFICANT CHANGE UP
HCV AB SERPL-IMP: SIGNIFICANT CHANGE UP
HCV AB SERPL-IMP: SIGNIFICANT CHANGE UP
HGB BLD-MCNC: 8.4 G/DL — LOW (ref 12–16)
MCHC RBC-ENTMCNC: 28.8 PG — SIGNIFICANT CHANGE UP (ref 27–31)
MCHC RBC-ENTMCNC: 32.9 G/DL — SIGNIFICANT CHANGE UP (ref 32–36)
MCV RBC AUTO: 87.3 FL — SIGNIFICANT CHANGE UP (ref 81–99)
PLATELET # BLD AUTO: 546 K/UL — HIGH (ref 150–400)
POTASSIUM SERPL-MCNC: 4.6 MMOL/L — SIGNIFICANT CHANGE UP (ref 3.5–5.3)
POTASSIUM SERPL-SCNC: 4.6 MMOL/L — SIGNIFICANT CHANGE UP (ref 3.5–5.3)
RBC # BLD: 2.92 M/UL — LOW (ref 4.4–5.2)
RBC # FLD: 12.8 % — SIGNIFICANT CHANGE UP (ref 11–15.6)
SODIUM SERPL-SCNC: 133 MMOL/L — LOW (ref 135–145)
TOTAL HEM COMP BLD-ACNC: >63 U/ML — HIGH (ref 42–60)
WBC # BLD: 7.7 K/UL — SIGNIFICANT CHANGE UP (ref 4.8–10.8)
WBC # FLD AUTO: 7.7 K/UL — SIGNIFICANT CHANGE UP (ref 4.8–10.8)

## 2018-03-16 PROCEDURE — 71045 X-RAY EXAM CHEST 1 VIEW: CPT | Mod: 26

## 2018-03-16 PROCEDURE — 99233 SBSQ HOSP IP/OBS HIGH 50: CPT

## 2018-03-16 PROCEDURE — 99233 SBSQ HOSP IP/OBS HIGH 50: CPT | Mod: GC

## 2018-03-16 RX ORDER — LOSARTAN POTASSIUM 100 MG/1
1 TABLET, FILM COATED ORAL
Qty: 0 | Refills: 0 | COMMUNITY

## 2018-03-16 RX ADMIN — Medication 25 MILLIGRAM(S): at 21:01

## 2018-03-16 RX ADMIN — SIMVASTATIN 40 MILLIGRAM(S): 20 TABLET, FILM COATED ORAL at 21:01

## 2018-03-16 RX ADMIN — Medication 1 DROP(S): at 17:56

## 2018-03-16 RX ADMIN — Medication 650 MILLIGRAM(S): at 06:22

## 2018-03-16 RX ADMIN — IRON SUCROSE 220 MILLIGRAM(S): 20 INJECTION, SOLUTION INTRAVENOUS at 21:01

## 2018-03-16 RX ADMIN — PANTOPRAZOLE SODIUM 40 MILLIGRAM(S): 20 TABLET, DELAYED RELEASE ORAL at 17:56

## 2018-03-16 RX ADMIN — Medication 0.1 MILLIGRAM(S): at 06:21

## 2018-03-16 RX ADMIN — Medication 25 MILLIGRAM(S): at 12:27

## 2018-03-16 RX ADMIN — Medication 1 DROP(S): at 06:21

## 2018-03-16 RX ADMIN — PANTOPRAZOLE SODIUM 40 MILLIGRAM(S): 20 TABLET, DELAYED RELEASE ORAL at 06:21

## 2018-03-16 RX ADMIN — Medication 1: at 12:56

## 2018-03-16 RX ADMIN — Medication 0.6 MILLIGRAM(S): at 23:16

## 2018-03-16 RX ADMIN — Medication 0.1 MILLIGRAM(S): at 17:56

## 2018-03-16 RX ADMIN — Medication 0.6 MILLIGRAM(S): at 06:21

## 2018-03-16 RX ADMIN — Medication 650 MILLIGRAM(S): at 07:15

## 2018-03-16 RX ADMIN — Medication 25 MILLIGRAM(S): at 06:21

## 2018-03-16 NOTE — PROGRESS NOTE ADULT - PROBLEM SELECTOR PLAN 2
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 given pericardial rub on exam   -c/w supplemental o2   -c/w colchicine 0.6mg po bid   - Hemodialysis today  -cardio f/u noted and appreciated  -nephro f/u noted and appreciated Bilateral pleural effusions and pericardial effusion, noted tte with preserved ef less likely HF related, concern for fluid status being secondary to ARF now requiring hemodialysis   -tte shows preserved EF and moderate pericardiac effusion 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 given pericardial rub on exam   -c/w supplemental o2   -c/w colchicine 0.6mg po bid   - Hemodialysis today  -cardio f/u noted and appreciated  -nephro f/u noted and appreciated Bilateral pleural effusions and pericardial effusion, noted tte with preserved EF, likely due to worsening renal failure but unclear if component of HFPEF   -tte shows preserved EF and moderate pericardial effusion   -c/w supplemental o2 as needed   -c/w colchicine 0.6mg po bid for presumed pericarditis   - Hemodialysis today again for fluid removal   -cardio f/u noted and appreciated and will need further ischemic evaluation with C possibly monday. Will need to arrange with nephro to have HD thereafter.   -nephro f/u noted and appreciated

## 2018-03-16 NOTE — CONSULT NOTE ADULT - ASSESSMENT
58 yo F with PMH of  HTN/ HLD, DMII, hyperkalemia, preserved LVEF 65%, MARSHALL on CKD with possible GI bleed profound anemia , ? CAP and newly dx AF.   Given the need for systemic OAC given elevated [id number] score EP consulted for possible KELLI occlusion device.    Imp;    AF agree with AV ofelia agents, Diltiazem 30 q 6, add amio po load if HF refractory , check TSH, will eval primary data further reccs regarding  WATCH MAN to follow.  GI eval , prbc ongoing  low (+) TNI likely demand , preserved LV systolic function   MARSHALL on CKD renal eval for hD ongoing 58 yo F with PMH of  HTN/ HLD, DMII, hyperkalemia, preserved LVEF 65%, MARSHALL on CKD with possible GI bleed profound anemia , ? CAP and newly dx AF.   Given the need for systemic OAC given elevated [id number] score EP consulted for possible KELLI occlusion device.    Imp;    AF agree with AV ofelia agents, Diltiazem 30 q 6, add amio po load if HF refractory , check TSH, will eval primary data further reccs regarding  WATCH MAN to follow.  GI eval , prbc ongoing  low (+) TNI likely demand , preserved LV systolic function   MARSHALL on CKD renal eval for hD ongoing   DR Orozco will see patient for EP update on Monday

## 2018-03-16 NOTE — DISCHARGE NOTE ADULT - CARE PROVIDER_API CALL
Camille Lopez), Family Medicine  [address] [place] [address]  Phone: [phone]  Fax: [phone]    Haley Patton (), Gastroenterology  [address] [facility]  [address]  [place] [address]  Phone: [phone]  Fax: [phone]    Star Ortega ([facility]), Cardiology; Cardiovascular Disease; Interventional Cardiology  [address] [facility] [address]  [place], [place] [id number]  Phone: [phone]  Fax: [phone]    Kelvin Kevin (DO), Medicine  [address] [facility]  [place] [address]  Phone: [phone]  Fax: [phone]

## 2018-03-16 NOTE — CONSULT NOTE ADULT - SUBJECTIVE AND OBJECTIVE BOX
[facility]/EP                                                        [facility]                                                             Office: [address] [place] [address]                                                            Telephone: [phone]. Fax:[phone]          Patient is a 57y old  Female who presents with a chief complaint of dark stools (14 Mar 2018 02:54)      HPI:  Ms. Elizabeth is a 58 yo F with PMH of  HTN/ HLD, DMII, hyperkalemia who presents with 3 weeks of postprandial vomiting and stomach pain. Patient states that for the past three weeks, she has had nausea with postprandial emesis of food contents.  Today she vomited twice, one time which had an isolated episode of scant bright red blood in the vomitus. After vomiting she feels lightheadedness/weakness without vertigo. Associated with the nausea she has had a pleuritic chest pain, 10/10 in severity, intermittent, which arises after she vomits, worsens with leaning forward, alleviated with supine position. For the pain she has taken 600mg motrin once a day, but due to stomach upset from the Motrin also took Tylenol for pain.  Co-morbid conditions include profound anemia  and possible CAP newly Dx AF given the need for systemic OAC EP service in consulted to assess candidacy fro KELLI occlusion device.          Patient denies orthopnea PND,LE edema , syncope or pre-syncope  Functional status > 4 mets  No limitations with AODL  Co-morbid: (-) DM, (_) CVA, (_) COPD (-) PE (-) DVT (-) Bleeding or clotting disorders  ECG: AF with ns st twa  no phenotypic evidence of Brs, HCM ,LQTS    PAST MEDICAL & SURGICAL HISTORY:  HLD (hyperlipidemia)  HTN (hypertension)  DM (diabetes mellitus)  No significant past surgical history      PREVIOUS DIAGNOSTIC TESTING:      ECHO  FINDINGS:< from: TTE Echo Complete w/Doppler (18 @ 08:07) >       Summary:   1. Left ventricular ejection fraction, by visual estimation, is 60 to   65%.   2. Normal global left ventricular systolic function.   3. Spectral Doppler shows impaired relaxation pattern of left   ventricular myocardial filling (Grade I diastolic dysfunction).   4. Normal right ventricular size and function.   5. Moderate pericardial effusion.   6. Moderate pleural effusion in both left and right lateral regions.   7. Mild mitral valve regurgitation.   8. Sclerotic aortic valve with normal opening.   9. Questionable llambl's visualized on the aortic valve.  10. Mildly dilated pulmonary artery.        Allergies    No Known Allergies    Intolerances        MEDICATIONS  (STANDING):  artificial  tears Solution 1 Drop(s) Both EYES every 12 hours  cloNIDine 0.1 milliGRAM(s) Oral two times a day  colchicine 0.6 milliGRAM(s) Oral two times a day  dextrose 5%. 1000 milliLiter(s) (50 mL/Hr) IV Continuous <Continuous>  dextrose 50% Injectable 12.5 Gram(s) IV Push once  dextrose 50% Injectable 25 Gram(s) IV Push once  dextrose 50% Injectable 25 Gram(s) IV Push once  diltiazem    Tablet 30 milliGRAM(s) Oral every 6 hours  hydrALAZINE 25 milliGRAM(s) Oral every 8 hours  insulin lispro (HumaLOG) corrective regimen sliding scale   SubCutaneous three times a day before meals  insulin lispro (HumaLOG) corrective regimen sliding scale   SubCutaneous at bedtime  iron sucrose IVPB 200 milliGRAM(s) IV Intermittent daily  pantoprazole    Tablet 40 milliGRAM(s) Oral two times a day  simvastatin 40 milliGRAM(s) Oral at bedtime    MEDICATIONS  (PRN):  acetaminophen   Tablet 650 milliGRAM(s) Oral every 6 hours PRN For Temp greater than 38 C (100.4 F)  acetaminophen   Tablet. 650 milliGRAM(s) Oral every 6 hours PRN Moderate Pain (4 - 6)  dextrose Gel 1 Dose(s) Oral once PRN Blood Glucose LESS THAN 70 milliGRAM(s)/deciliter  glucagon  Injectable 1 milliGRAM(s) IntraMuscular once PRN Glucose LESS THAN 70 milligrams/deciliter  ondansetron Injectable 4 milliGRAM(s) IV Push every 6 hours PRN Nausea      FAMILY HISTORY:  No pertinent family history in first degree relatives      SOCIAL HISTORY:Lives with family    CIGARETTES:None    ALCOHOL:None    REVIEW OF SYSTEMS:  CONSTITUTIONAL: No fever, weight loss, or fatigue  EYES: No eye pain, visual disturbances, or discharge  ENMT:  No difficulty hearing, tinnitus, vertigo; No sinus or throat pain  NECK: No pain or stiffness  RESPIRATORY: No cough, wheezing, chills or hemoptysis; No Shortness of Breath  CARDIOVASCULAR: No chest pain, palpitations, passing out, dizziness, or leg swelling  GASTROINTESTINAL: No abdominal or epigastric pain. No nausea, vomiting, or hematemesis; No diarrhea or constipation. No melena or hematochezia.  GENITOURINARY: No dysuria, frequency, hematuria, or incontinence  NEUROLOGICAL: No headaches, memory loss, loss of strength, numbness, or tremors  SKIN: No itching, burning, rashes, or lesions   LYMPH Nodes: No enlarged glands  ENDOCRINE: No heat or cold intolerance; No hair loss  MUSCULOSKELETAL: No joint pain or swelling; No muscle, back, or extremity pain  PSYCHIATRIC: No depression, anxiety, mood swings, or difficulty sleeping  HEME/LYMPH: No easy bruising, or bleeding gums  ALLERY AND IMMUNOLOGIC: No hives or eczema	    Vital Signs Last 24 Hrs  T(C): 36.5 (16 Mar 2018 10:13), Max: 36.7 (16 Mar 2018 06:16)  T(F): 97.7 (16 Mar 2018 10:13), Max: 98.1 (16 Mar 2018 06:16)  HR: 76 (16 Mar 2018 12:32) (72 - 142)  BP: 150/76 (16 Mar 2018 12:32) (130/68 - 164/80)  BP(mean): --  RR: 18 (16 Mar 2018 12:32) (18 - 20)  SpO2: 95% (16 Mar 2018 12:32) (90% - 95%)    Daily     Daily Weight in k.4 (16 Mar 2018 06:09)    I&O's Detail    15 Mar 2018 07:01  -  16 Mar 2018 07:00  --------------------------------------------------------  IN:  Total IN: 0 mL    OUT:    Other: 500 mL    Voided: 200 mL  Total OUT: 700 mL    Total NET: -700 mL          PHYSICAL EXAM:  Appearance: Normal, well nourished	  HEENT:   Normal oral mucosa, PERRL, EOMI, sclera non-icteric	  Lymphatic: No cervical lymphadenopathy  Cardiovascular: Normal S1 S2, No JVD, No cardiac murmurs, No carotid bruits, No peripheral edema  Respiratory: Lungs clear to auscultation	  Psychiatry: A & O x 3, Mood & affect appropriate  Gastrointestinal:  Soft, Non-tender, + BS, no bruits	  Skin: No rashes, No ecchymoses, No cyanosis  Neurologic: Grossly non-focal with full strength in all four extremities  Extremities: Normal range of motion, No clubbing, cyanosis or edema  Vascular: Peripheral pulses palpable 2+ bilaterally      INTERPRETATION OF TELEMETRY:    ECG: AF with ns st twa    LABS:                        8.4    7.7   )-----------( 546      ( 16 Mar 2018 07:22 )             25.5     03-16    133<L>  |  96<L>  |  42.0<H>  ----------------------------<  130<H>  4.6   |  24.0  |  4.41<H>    Ca    8.4<L>      16 Mar 2018 07:22  Phos  6.5     03-15                I&O's Summary    15 Mar 2018 07:01  -  16 Mar 2018 07:00  --------------------------------------------------------  IN: 0 mL / OUT: 700 mL / NET: -700 mL      BNP  RADIOLOGY & ADDITIONAL STUDIES:

## 2018-03-16 NOTE — DISCHARGE NOTE ADULT - MEDICATION SUMMARY - MEDICATIONS TO STOP TAKING
I will STOP taking the medications listed below when I get home from the hospital:    Lantus 100 units/mL subcutaneous solution  -- 20 unit(s) subcutaneous once a day (at bedtime)    insulin lispro 100 units/mL subcutaneous solution  -- 15 unit(s) subcutaneous 3 times a day (before meals)    Janumet 50 mg-1000 mg oral tablet  -- 1 tab(s) by mouth 2 times a day    losartan 100 mg oral tablet  -- 1 tab(s) by mouth once a day

## 2018-03-16 NOTE — DISCHARGE NOTE ADULT - HOSPITAL COURSE
58 yo F with PMH s/f HTN/HLD, DMII, hyperkalemia, ckd 3 who presents with 3 weeks of postprandial vomiting and abd pain, noted to have ARF, fluid overload with bilateral pleural effusions and pericardial effusion as well as symptomatic anemia likely multifactorial secondary to acute on chronic renal failure/ KULWANT and suspected upper GI Bleeding from vomiting/retching and NSAID use. Clinical concern that worsening acute on chronic renal failure possibly caused uremia related sx of N/V, with patients NSAID use and retching suspected GI bleed but occult blood returned negative, for dropping h/h pt received 2 u prbc on admission with appropriate response to therapy. Thereafter with epigastric and chest pain, worsening renal function causing fluid overload with b/l pleural and pericardial effusion. Pt was actively being diuresed, yesterday noted with new pericardial rub and patient being sent for urgent hemodialysis. Vascular consulted for access. 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 Nephrology/ cardiology and GI continue to follow the patient.  TTE returned with preserved EF but moderate pericardial effusion, pt also empirically placed on colchicine. During hemodialysis, patient with new onset AFib, cardiology was consulted over the phone and recommended starting Cardizem (diltiazem) 30mg PO [id number] with conversion and maintained sinus rhythm since then. 58 yo F with PMH s/f HTN/HLD, DMII, hyperkalemia, ckd 3 who presents with 3 weeks of postprandial vomiting and abd pain, noted to have ARF, fluid overload. Nephro consult appreciated, patient underwent emergent HD. Responded well with resolution of uremia and associated symptoms of n/v. Seen regularly by nephrology and placed on continuous HD schedule. Permacath placed on right side of the next on 3/22, well tolerated procedure. Fistula placed on Left arm for long term treatment by vascular sx on 3/28, also well toelrated procedure. during this admission for long term therapy. Seat established in the community prior to discharge on MWF schedule for continued care. Despite work up, no clear cause of acute renal failure requiring HD was noted, possibly a side effect of long standing DM, advised to continue close follow up with nephrology as an outpatient.     Presentation complicated by uremic pericarditis as complicated of acute renal failure. Cardiology consultation appreciated. Uremia resolved with HD as stated above. Cath performed and noted to be negative. Started on colchicine. Pt tolerated tx well. Last day colchicine to be 4/5.    Presentation complicated by acute on chronic anemia which was multifactorial in nature stemming from both anemia of chronic renal disease as well as acute blood loss anemia from suspected upper GI bleed. sp 4 unti PRBC during admission, well tolerated transfusions. Started on ppi bid and taken to EGD by GI on 3/30, noted to have altered vascular pattern in gastric fundus, body, and antrym with scattered subepithelial petechia and ecchymosis. No active bleeding seen. Advised to follow up with GI as an outpatient.     Hospital course complicated by new onset Paroxsymal Atrial Fibrillation. Noted to occur during first HD session. Resolved back to NSR with use of cardizem. Remained in NSR from that point on throughtout the rest of hospitalization with HR well controlled on later started beta blocker therapy. After EGD performed, patient cleared by GI to start stroke ppx with coumadin. Started on coumadin and tolerated well without signs of bleed. At time of discharge however noted to be supratherapeutic without complications. Advised to see Cardio in 2-3 days for repeat INR. No coumadin given on discharge. Patient instructed at bedside in native language importance for follow up with cardio as instructed to have INR checked and to have coumadin prescribed at lower dose, likely 3mg, moving forward with goal INR 2-3.     Course also complicated by RUE basilic vein distal thrombosis. Note don US done during admission. No acute intervention needed, supportive care used.     Regarding HTN, pt noted to have episodes of uncontrolled htn that were asymptomatic. clonidine titrated up to 0.3mg bid and pt continued on bb and hydralazine. Advised to continue HD to help with volume status. Advised to follow with cardio + pmd for continued titration of meds PRN.     Regarding DM2 on long term insulin therapy complicated by esrd on hd. A1c 7.6, slightly uncontrolled. Managed with insulin sliding scale to be continued as outpatient.     All electrolyte abnormalities were monitored carefully and repleted as necessary during this hospitalization. At the time of discharge patient was hemodynamically stable and amenable to all terms of discharge. The patient has received verbal instructions from myself regarding discharge plans.     Length of Discharge: 45MIN 56 yo F with PMH s/f HTN/HLD, DMII, hyperkalemia, ckd 3 who presents with 3 weeks of postprandial vomiting and abd pain, noted to have ARF, fluid overload. Nephro consult appreciated, patient underwent emergent HD. Responded well with resolution of uremia and associated symptoms of n/v. Seen regularly by nephrology and placed on continuous HD schedule. Permacath placed on right side of the next on 3/22, well tolerated procedure. Fistula placed on Left arm for long term treatment by vascular sx on 3/28, also well toelrated procedure. during this admission for long term therapy. Seat established in the community prior to discharge on MWF schedule for continued care. Despite work up, no clear cause of acute renal failure requiring HD was noted, possibly a side effect of long standing DM, advised to continue close follow up with nephrology as an outpatient.     Presentation complicated by uremic pericarditis as complicated of acute renal failure. Cardiology consultation appreciated. Uremia resolved with HD as stated above. Cath performed and noted to be negative. Started on colchicine. Pt tolerated tx well. Last day colchicine to be 4/5.    Presentation complicated by acute on chronic anemia which was multifactorial in nature stemming from both anemia of chronic renal disease as well as acute blood loss anemia from suspected upper GI bleed. sp 4 unti PRBC during admission, well tolerated transfusions. Started on ppi bid and taken to EGD by GI on 3/30, noted to have altered vascular pattern in gastric fundus, body, and antrym with scattered subepithelial petechia and ecchymosis. No active bleeding seen. Advised to follow up with GI as an outpatient.     Hospital course complicated by new onset Paroxsymal Atrial Fibrillation. Noted to occur during first HD session. Resolved back to NSR with use of cardizem. Remained in NSR from that point on throughtout the rest of hospitalization with HR well controlled on later started beta blocker therapy. After EGD performed, patient cleared by GI to start stroke ppx with coumadin. Started on coumadin and tolerated well without signs of bleed. At time of discharge however noted to be supratherapeutic without complications. Advised to see Cardio in 2-3 days for repeat INR. No coumadin given on discharge. Patient instructed at bedside in native language importance for follow up with cardio as instructed to have INR checked and to have coumadin prescribed at lower dose, likely 3mg, moving forward with goal INR 2-3.     Course also complicated by RUE basilic vein distal thrombosis. Note don US done during admission. No acute intervention needed, supportive care used.     Regarding HTN, pt noted to have episodes of uncontrolled htn that were asymptomatic. clonidine titrated up to 0.3mg bid and pt continued on bb and hydralazine. Advised to continue HD to help with volume status. Advised to follow with cardio + pmd for continued titration of meds PRN.     Regarding DM2 on long term insulin therapy complicated by esrd on hd. A1c 7.6, slightly uncontrolled. Managed with insulin sliding scale to be continued as outpatient.     Patient was seen by physical therapy on the day of discharge and determined to need a rolling walker to ambulate , which was arranged for patient.     All electrolyte abnormalities were monitored carefully and repleted as necessary during this hospitalization. At the time of discharge patient was hemodynamically stable and amenable to all terms of discharge. The patient has received verbal instructions from myself regarding discharge plans.     Length of Discharge: 45MIN

## 2018-03-16 NOTE — PROGRESS NOTE ADULT - PROBLEM SELECTOR PLAN 1
baseline ckd 3 now in ARF with uremia sx, new pericardial rub/ uremic pericarditis also with sx of fluid overload w/ bilateral pleural effusions   -Worsening bun/cr today    -pt clinically still sob   -c/w strict I/O    -daily weight   -s/p diuresis with lasix 40iv bid yest now discontinued   -hyponatremia is secondary to worsening renal function   -avoid nephrotoxic medications   -renal US noted with chronic medical renal dz no evidence of hydronephrosis   -nephro f/u noted and appreciated and d/w Dr. aGy the plan of care and pt to go for emergent dialysis   -f/u acute hepatitis panel and ppd, pt will need a HD seat in the community   -vascular consulted for access baseline ckd 3 now in ARF with uremia sx, new pericardial rub/ uremic pericarditis also with sx of fluid overload w/ bilateral pleural effusions   - Decrease in Cr from 6 to 4 after hemodialysis.      - Patient clinically still sob   - c/w strict I/O      -daily weight   -s/p diuresis with lasix 40iv bid, now discontinued   -hyponatremia is secondary to worsening renal function   -avoid nephrotoxic medications   -renal US noted with chronic medical renal disease no evidence of hydronephrosis   -nephro f/u noted and appreciated and patient likely to require long-term hemodialysis.  -Acute hepatitis panel negative and will follow up ppd, pt will need a HD seat in the community   -vascular consulted for access baseline ckd 3 now in ARF with pulm edema/ uremia sx/ uremic pericarditis requiring dialysis     -pt still clinically sob, tachypneic   -bun/cr noted and continues to be trended   - c/w strict I/O      -daily weight   -s/p dialysis yest and to continue with dialysis today    -hyponatremia is secondary to worsening renal function   -avoid nephrotoxic medications   -renal US noted with chronic medical renal disease no evidence of hydronephrosis   -nephro f/u noted and appreciated and patient likely to require long-term hemodialysis.  -Acute hepatitis panel negative    -ppd at 24 hours OMM negative thus far    -pt will need a HD seat in the community   -vascular consult noted and appreciated for femoral access which was obtained yest; aware that the femoral line has to come out and PC needed. As per vascular will be placed on monday.

## 2018-03-16 NOTE — PROGRESS NOTE ADULT - ASSESSMENT
56 yo F with PMH s/f HTN/HLD, DMII, hyperkalemia, ckd 3 who presents with 3 weeks of postprandial vomiting and abd pain, noted to have ARF, fluid overload with bilateral pleural effusions and pericardial effsuion as well as symptomatic anemia likely multifactorial secondary to acute on chronic renal failure/ KULWANT and suspected upper GI Bleeding from vomiting/retching and NSAID use. Clinical concern that worsening acute on chronic renal failure possibly caused uremia related sx of N/V, with patients NSAID use and retching suspected GI bleed but occult blood returned negative, for dropping h/h pt received 2 u prbc on admission with appropriate response to therapy. Thereafter with epigastric and chest pain, worsening renal function causing fluid overload with b/l pleural and pericardial effusion. Pt was actively being diuresed, today noted with new pericardial rub and patient being sent for urgent hemodialysis. Vascular consulted for access. 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 Nephrology/ cardiology and GI continue to follow the patient.  TTE returned with preserved EF but moderate pericardial effusion, pt also empirically placed on colchicine. 58 yo F with PMH s/f HTN/HLD, DMII, hyperkalemia, ckd 3 who presents with 3 weeks of postprandial vomiting and abd pain, noted to have ARF, fluid overload with bilateral pleural effusions and pericardial effusion as well as symptomatic anemia likely multifactorial secondary to acute on chronic renal failure/ KULWANT and suspected upper GI Bleeding from vomiting/retching and NSAID use. Clinical concern that worsening acute on chronic renal failure possibly caused uremia related sx of N/V, with patients NSAID use and retching suspected GI bleed but occult blood returned negative, for dropping h/h pt received 2 u prbc on admission with appropriate response to therapy. Thereafter with epigastric and chest pain, worsening renal function causing fluid overload with b/l pleural and pericardial effusion. Pt was actively being diuresed, yesterday noted with new pericardial rub and patient being sent for urgent hemodialysis. Vascular consulted for access. 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 Nephrology/ cardiology and GI continue to follow the patient.  TTE returned with preserved EF but moderate pericardial effusion, pt also empirically placed on colchicine. During hemodialysis, patient with new onset AFib, cardiology was consulted over the phone and recommended starting Cardizem (diltiazem) 30mg PO [id number] with conversion and maintained sinus rhythm since then. 57 y.o. F with hx of HTN, HLD, DMII, hyperkalemia, ckd 3 who presented with 3 weeks of postprandial vomiting and abd pain, noted to have ARF, fluid overload with bilateral pleural effusions and pericardial effusion as well as symptomatic anemia likely multifactorial secondary to acute on chronic renal failure/ KULWANT and suspected upper GI Bleeding from vomiting/retching and NSAID use. Clinical concern that worsening acute on chronic renal failure possibly caused uremia related sx of N/V, with patients NSAID use and retching suspected GI bleed but occult blood returned negative, for dropping h/h pt received 2 u prbc on admission with appropriate response to therapy. 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 In the interim while being dialyzed the patient had an episode of PAF, s/p cardizem became rate controlled and remained in NSR. EP consulted. Unable to AC the patient due to initial concern for GI bleeding, for which GI consulted and pt pending possible GI intervention when respiratory status improves. Clinical concern for ARF causing volume overload, unclear if HFPEF is a contributing factor. Pt will need eventual ischemia evaluation with cardiology with [facility] possibly monday.

## 2018-03-16 NOTE — DISCHARGE NOTE ADULT - NS AS ACTIVITY OBS
Showering allowed/Walking-Indoors allowed/Walking-Outdoors allowed/Stairs allowed/No Heavy lifting/straining Showering allowed/Walking-Indoors allowed/Walking-Outdoors allowed/ambulate with a rolling walker/Stairs allowed/No Heavy lifting/straining

## 2018-03-16 NOTE — DISCHARGE NOTE ADULT - PATIENT PORTAL LINK FT
You can access the [facility] Patient Portal, offered by [facility], by registering with the following website: [email or web address]

## 2018-03-16 NOTE — DISCHARGE NOTE ADULT - MEDICATION SUMMARY - MEDICATIONS TO TAKE
I will START or STAY ON the medications listed below when I get home from the hospital:    .synringes qid  -- Indication: For Diabetes mellitus    . alcohol swabs qid  -- 1 application between cheek and gums once a day  -- Indication: For Diabetes mellitus    . lancets  SC qid  -- Indication: For Diabetes mellitus    . Glucometer   -- Indication: For Diabetes mellitus    Blood glucose testing strips  -- Indication: For Diabetes mellitus    DME: medical equipment  -- 1 unit(s) : rolling walker R26.2  -- Indication: For walking    aspirin 81 mg oral delayed release tablet  -- 1 tab(s) by mouth once a day  -- Indication: For Antiplatelet    cloNIDine 0.3 mg oral tablet  -- 1 tab(s) by mouth 2 times a day  -- Indication: For Hypertension    HumaLOG 100 units/mL injectable solution  -- : 1 unit if glucose 151-200; 2 unit if 201-250; 3 unit if 251-300; 4 unit if 301-350; 5 unit if 351-400; 6 unit if 400+: call MD   -- Indication: For BEFORE MEALS AND AT BEDTIME; for diabetes mellitus    colchicine 0.6 mg oral tablet  -- 1 tab(s) by mouth 2 times a day until 4/28/2018  -- Indication: For For uremic pericarditis    simvastatin 40 mg oral tablet  -- 1 tab(s) by mouth once a day (at bedtime)  -- Indication: For Hyperlipidemia    carvedilol 12.5 mg oral tablet  -- 1 tab(s) by mouth every 12 hours  -- Indication: For Hypertension/ a fib    senna oral tablet  -- 2 tab(s) by mouth once a day (at bedtime), As Needed  -- Indication: For Constipation, as needed    docusate sodium 100 mg oral capsule  -- 1 cap(s) by mouth once a day, As Needed - for constipation  -- Indication: For Constipation, as needed    Systane Balance ophthalmic solution  -- 1 drop(s) to each affected eye 2 times a day  -- Indication: For Eyes    pantoprazole 40 mg oral delayed release tablet  -- 1 tab(s) by mouth once a day (before a meal)  -- Indication: For GERD    hydrALAZINE 50 mg oral tablet  -- 1 tab(s) by mouth every 8 hours  -- Indication: For Hypertension    Vitamin D3 1000 intl units oral tablet  -- 1 tab(s) by mouth once a week  -- Indication: For Vitamin

## 2018-03-16 NOTE — PROGRESS NOTE ADULT - SUBJECTIVE AND OBJECTIVE BOX
Pt seen and examined. Appears tachypneic this AM.       MEDICATIONS:  MEDICATIONS  (STANDING):  artificial  tears Solution 1 Drop(s) Both EYES every 12 hours  cloNIDine 0.1 milliGRAM(s) Oral two times a day  colchicine 0.6 milliGRAM(s) Oral two times a day  dextrose 5%. 1000 milliLiter(s) (50 mL/Hr) IV Continuous <Continuous>  dextrose 50% Injectable 12.5 Gram(s) IV Push once  dextrose 50% Injectable 25 Gram(s) IV Push once  dextrose 50% Injectable 25 Gram(s) IV Push once  diltiazem    Tablet 30 milliGRAM(s) Oral every 6 hours  hydrALAZINE 25 milliGRAM(s) Oral every 8 hours  insulin lispro (HumaLOG) corrective regimen sliding scale   SubCutaneous three times a day before meals  insulin lispro (HumaLOG) corrective regimen sliding scale   SubCutaneous at bedtime  iron sucrose IVPB 200 milliGRAM(s) IV Intermittent daily  pantoprazole    Tablet 40 milliGRAM(s) Oral two times a day  simvastatin 40 milliGRAM(s) Oral at bedtime    MEDICATIONS  (PRN):  acetaminophen   Tablet 650 milliGRAM(s) Oral every 6 hours PRN For Temp greater than 38 C (100.4 F)  acetaminophen   Tablet. 650 milliGRAM(s) Oral every 6 hours PRN Moderate Pain (4 - 6)  dextrose Gel 1 Dose(s) Oral once PRN Blood Glucose LESS THAN 70 milliGRAM(s)/deciliter  glucagon  Injectable 1 milliGRAM(s) IntraMuscular once PRN Glucose LESS THAN 70 milligrams/deciliter  ondansetron Injectable 4 milliGRAM(s) IV Push every 6 hours PRN Nausea      Allergies    No Known Allergies    Intolerances        Vital Signs Last 24 Hrs  T(C): 36.7 (16 Mar 2018 06:16), Max: 36.7 (15 Mar 2018 12:15)  T(F): 98.1 (16 Mar 2018 06:16), Max: 98.1 (16 Mar 2018 06:16)  HR: 81 (16 Mar 2018 06:16) (76 - 142)  BP: 164/80 (16 Mar 2018 06:16) (130/91 - 164/80)  BP(mean): --  RR: 18 (16 Mar 2018 06:16) (16 - 20)  SpO2: 93% (16 Mar 2018 06:16) (90% - 94%)    03-15 @ 07:01  -  03-16 @ 07:00  --------------------------------------------------------  IN: 0 mL / OUT: 700 mL / NET: -700 mL        PHYSICAL EXAM:    General: Well developed; well nourished; in no acute distress  HEENT: MMM, conjunctiva and sclera clear  Lungs: Decreased breath sounds bilaterally.  Cor: RRR S1, S2 only.  Gastrointestinal: Abdomen: Soft non-tender non-distended; Normal bowel sounds; No hepatosplenomegaly  Extremities: no cyanosis, clubbing or edema.  Skin: Warm and dry. No obvious rash  Neuro: Pt. a + o x 3    LABS:  ABG - ( 14 Mar 2018 19:02 )  pH: 7.48  /  pCO2: 29    /  pO2: 65    / HCO3: 24    / Base Excess: -0.9  /  SaO2: 95                  CBC Full  -  ( 15 Mar 2018 06:11 )  WBC Count : 7.9 K/uL  Hemoglobin : 8.8 g/dL  Hematocrit : 26.2 %  Platelet Count - Automated : 504 K/uL  Mean Cell Volume : 84.5 fl  Mean Cell Hemoglobin : 28.4 pg  Mean Cell Hemoglobin Concentration : 33.6 g/dL  Auto Neutrophil # : x  Auto Lymphocyte # : x  Auto Monocyte # : x  Auto Eosinophil # : x  Auto Basophil # : x  Auto Neutrophil % : x  Auto Lymphocyte % : x  Auto Monocyte % : x  Auto Eosinophil % : x  Auto Basophil % : x    03-15    132<L>  |  95<L>  |  70.0<H>  ----------------------------<  93  5.1   |  21.0<L>  |  6.13<H>    Ca    8.5<L>      15 Mar 2018 06:11  Phos  6.5     03-15  Mg     2.1     03-14    TPro  6.6  /  Alb  2.7<L>  /  TBili  0.4  /  DBili  x   /  AST  12  /  ALT  8   /  AlkPhos  95  03-14    PT/INR - ( 14 Mar 2018 11:12 )   PT: 11.0 sec;   INR: 1.00 ratio         PTT - ( 14 Mar 2018 11:12 )  PTT:29.0 sec                  RADIOLOGY & ADDITIONAL STUDIES (The following images were personally reviewed):

## 2018-03-16 NOTE — DISCHARGE NOTE ADULT - OTHER SIGNIFICANT FINDINGS
For your AV fistula: No heavy lifting <10lbs.   May wash incision with soap and water and pat dry. Leave open to air. No ointments, powders or creams to incision. Do not shave over incision x 3 weeks or until healed. Monitor incision for any redness, swelling or drainage and call office immediately with any concerns. Office # [phone] 04-03    125<L>  |  88<L>  |  33.0<H>  ----------------------------<  181<H>  4.1   |  25.0  |  4.50<H>    Ca    8.1<L>      03 Apr 2018 07:51  Phos  3.8     04-03                            8.4    7.0   )-----------( 471      ( 03 Apr 2018 07:51 )             27.0       PT/INR - ( 03 Apr 2018 07:52 )   PT: 42.4 sec;   INR: 3.75 ratio         PTT - ( 03 Apr 2018 07:52 )  PTT:103.6 sec

## 2018-03-16 NOTE — DISCHARGE NOTE ADULT - PLAN OF CARE
When the kidney is not working properly, fluid can accumulate in the body, in your case this happened in your lungs and around your heart, you will require hemodialysis to prevent further accumulation of fluid. The decreased in your kidney function was likely the cause of your nausea and vomiting, patient that are vomiting frequently and for long periods of time can develop tears in the mucosa of the esophagus and can cause blood vomiting. In addition, the kidney is the organ in charged of producing a hormone that stimulates the production of red blood cells. These two factors combined caused you to be very anemic and that's why you required blood transfusion. After discharge, you will need to follow up with your nephrologist (Kidney doctor) to monitor your Hemoglobin and to receive medications/supplements that can increase your hemoglobin levels. During you first hemodialysis session, you developed an abnormal heart rhythm known as atrial fibrillation which causes your heart to beat very fast and irregularly which can be dangerous since clots can form in your heart and travel to other parts of the body. Which is why you received a medication to slow down your heart rate. You will need to take your blood pressure medications as indicated above and to follow the diet described to control the hypertension. Follow up with your primary care doctor to monitor this. The symptoms of nausea and vomiting were likely due to the worsening of your kidney function, your kidney levels in blood were above your baseline, initially we gave you medication to increase your urination and try to improve your kidney levels, however, since a pericardial rub was found on physical examination, which is indicative of inflammation of the pericardium, you required emergency hemodialysis and you will likely require to continue getting hemodialysis regularly in the long term. Decrease in your symptoms. follow up The symptoms of nausea and vomiting were likely due to the worsening of your kidney function, your kidney levels in blood were above your baseline, initially we gave you medication to increase your urination and try to improve your kidney levels, however, since a pericardial rub was found on physical examination, which is indicative of inflammation of the pericardium, you required emergency hemodialysis and you will likely require to continue getting hemodialysis regularly in the long term.    Be sure to go for dialysis on 4/4/18. Be sure to follow with nephrology for your next session. When the kidney is not working properly, fluid can accumulate in the body, in your case this happened in your lungs and around your heart, you will require hemodialysis to prevent further accumulation of fluid.    Be sure not to miss hemodialysis. During you first hemodialysis session, you developed an abnormal heart rhythm known as atrial fibrillation which causes your heart to beat very fast and irregularly which can be dangerous since clots can form in your heart and travel to other parts of the body. Which is why you received a medication to slow down your heart rate.    Caution for signs of abnormal bleeding while on anticoagulation, this includes but is not limited to: excessively bleeding gums or nose bleeds, bleeding into urine, bleeding into stool (bright red blood or dark tarry colored stools), excessive bleeding after trauma or cuts. If this occur seek medical intervention immediately.     Be sure to see the cardiologist in the office. you will need to have a prescription for coumadin given by them, until you are seen in the office, do NOT take coumadin as your INR, blood number for coumadin, is already a little bit higher than it should be. Be sure to discuss coumadin with them. During you first hemodialysis session, you developed an abnormal heart rhythm known as atrial fibrillation which causes your heart to beat very fast and irregularly which can be dangerous since clots can form in your heart and travel to other parts of the body. Which is why you received a medication to slow down your heart rate.    Caution for signs of abnormal bleeding while on anticoagulation, this includes but is not limited to: excessively bleeding gums or nose bleeds, bleeding into urine, bleeding into stool (bright red blood or dark tarry colored stools), excessive bleeding after trauma or cuts. If this occur seek medical intervention immediately.     Be sure to see your primary medical doctor in the office. you will need to have a prescription for coumadin given by them, until you are seen in the office, do NOT take coumadin as your INR, blood number for coumadin, is already a little bit higher than it should be. Be sure to discuss coumadin with them. The decreased in your kidney function was likely the cause of your nausea and vomiting, patient that are vomiting frequently and for long periods of time can develop tears in the mucosa of the esophagus and can cause blood vomiting. In addition, the kidney is the organ in charged of producing a hormone that stimulates the production of red blood cells. These two factors combined caused you to be very anemic and that's why you required blood transfusion. After discharge, you will need to follow up with your nephrologist (Kidney doctor) to monitor your Hemoglobin and to receive medications/supplements that can increase your hemoglobin levels.  Keep your hemoglobin above 9 as per cardiology.

## 2018-03-16 NOTE — DISCHARGE NOTE ADULT - INSTRUCTIONS
- Healthy carbohydrates: During digestion, sugars (simple carbohydrates) and starches (complex carbohydrates) break down into blood glucose. Focus on the healthiest carbohydrates, such as fruits, vegetables, whole grains, legumes (beans, peas and lentils) and low-fat dairy products.  - Fiber-rich foods. Dietary fiber includes all parts of plant foods that your body can't digest or absorb. Fiber moderates how your body digests and helps control blood sugar levels. Foods high in fiber include vegetables, fruits, nuts, legumes (beans, peas and lentils), whole-wheat flour and wheat bran.     Continue a DASH (Dietary Approaches to Stop Hypertension) diet which includes more fruits, vegetables, whole grains, and low-fat dairy. Reduce intake of added sugars (as in soda or pastry), solid fats (like butter), refined grains, and sodium. Limit foods that are high in saturated fat, such as fatty meats, full-fat dairy products, and tropical oils such as coconut, palm kernel, and palm oils. Limit sugar-sweetened beverages and sweets. When following the DASH eating plan, it is important to choose foods that are:  - Low in saturated and trans fats  - Rich in potassium, calcium, magnesium, fiber, and protein  - Lower in sodium Please continue a renal diet. Use less salt and eat fewer salty foods: this may help to control blood pressure and reduce weight gains between dialysis sessions.  Use herbs, spices, and low-salt flavor enhancers in place of salt.  Avoid salt substitutes made with potassium.  - Healthy carbohydrates: During digestion, sugars (simple carbohydrates) and starches (complex carbohydrates) break down into blood glucose. Focus on the healthiest carbohydrates, such as fruits, vegetables, whole grains, legumes (beans, peas and lentils) and low-fat dairy products.  - Fiber-rich foods. Dietary fiber includes all parts of plant foods that your body can't digest or absorb. Fiber moderates how your body digests and helps control blood sugar levels. Foods high in fiber include vegetables, fruits, nuts, legumes (beans, peas and lentils), whole-wheat flour and wheat bran.     Continue a DASH (Dietary Approaches to Stop Hypertension) diet which includes more fruits, vegetables, whole grains, and low-fat dairy. Reduce intake of added sugars (as in soda or pastry), solid fats (like butter), refined grains, and sodium. Limit foods that are high in saturated fat, such as fatty meats, full-fat dairy products, and tropical oils such as coconut, palm kernel, and palm oils. Limit sugar-sweetened beverages and sweets. When following the DASH eating plan, it is important to choose foods that are:  - Low in saturated and trans fats  - Rich in potassium, calcium, magnesium, fiber, and protein  - Lower in sodium

## 2018-03-16 NOTE — PROGRESS NOTE ADULT - PROBLEM SELECTOR PLAN 6
No chemical ac given severe anemia   SCDs while in bed  Ambulate with assistance HBA1C: 7.6   fs stable  -c/w accuchecks achs tid   -c/w HSS  -c/w hypoglycemia protocol

## 2018-03-16 NOTE — PROGRESS NOTE ADULT - SUBJECTIVE AND OBJECTIVE BOX
[facility]                                                        Office: [address] [place] [address]                                                       Telephone: [phone]. Fax:[phone]      CARDIOLOGY PROGRESS NOTE   ([facility])    Subjective: Patient seen and examined at bedside.  Went into rapid atrial fibrillation yesterday during HD.  Started on diltiazem 30 mg q6hrs. Back in SR within 4 hrs. Spoke with her with aid of .     CURRENT MEDICATIONS  cloNIDine 0.1 milliGRAM(s) Oral two times a day  diltiazem    Tablet 30 milliGRAM(s) Oral every 6 hours  hydrALAZINE 25 milliGRAM(s) Oral every 8 hours  acetaminophen   Tablet 650 milliGRAM(s) Oral every 6 hours PRN  acetaminophen   Tablet. 650 milliGRAM(s) Oral every 6 hours PRN  ondansetron Injectable 4 milliGRAM(s) IV Push every 6 hours PRN  pantoprazole    Tablet 40 milliGRAM(s) Oral two times a day  colchicine 0.6 milliGRAM(s) Oral two times a day  dextrose 50% Injectable 12.5 Gram(s) IV Push once  dextrose 50% Injectable 25 Gram(s) IV Push once  dextrose 50% Injectable 25 Gram(s) IV Push once  dextrose Gel 1 Dose(s) Oral once PRN  glucagon  Injectable 1 milliGRAM(s) IntraMuscular once PRN  insulin lispro (HumaLOG) corrective regimen sliding scale   SubCutaneous three times a day before meals  insulin lispro (HumaLOG) corrective regimen sliding scale   SubCutaneous at bedtime  simvastatin 40 milliGRAM(s) Oral at bedtime  artificial  tears Solution 1 Drop(s) Both EYES every 12 hours  dextrose 5%. 1000 milliLiter(s) IV Continuous <Continuous>  iron sucrose IVPB 200 milliGRAM(s) IV Intermittent daily    	  TELEMETRY: SR 70s  Vitals:  T(C): 36.5 (03-16-18 @ 10:13), Max: 36.7 (03-15-18 @ 12:15)  HR: 72 (03-16-18 @ 10:13) (72 - 142)  BP: 130/68 (03-16-18 @ 10:13) (130/68 - 164/80)  RR: 18 (03-16-18 @ 10:13) (16 - 20)  SpO2: 94% (03-16-18 @ 09:50) (90% - 94%)  Wt(kg): --  I&O's Summary    15 Mar 2018 07:01  -  16 Mar 2018 07:00  --------------------------------------------------------  IN: 0 mL / OUT: 700 mL / NET: -700 mL      PHYSICAL EXAM:  Appearance: Normal	  HEENT:   Normal oral mucosa, PERRL, EOMI	  Lymphatic: No lymphadenopathy  Cardiovascular: Normal S1 S2, No JVD, No murmurs, No edema  Respiratory: Lungs clear to auscultation	  Psychiatry: A & O x 3, Mood & affect appropriate  Gastrointestinal:  Soft, Non-tender, + BS	  Skin: No rashes, No ecchymoses, No cyanosis  Neurologic: Non-focal  Extremities: Normal range of motion, No clubbing, cyanosis or edema  Vascular: Peripheral pulses palpable 2+ bilaterally      ECG (tracing reviewed by me):  	    DIAGNOSTIC TESTING:  Echocardiogram (images reviewed by me): 3/14/18  Summary:   1. Left ventricular ejection fraction, by visual estimation, is 60 to   65%.   2. Normal global left ventricular systolic function.   3. Spectral Doppler shows impaired relaxation pattern of left   ventricular myocardial filling (Grade I diastolic dysfunction).   4. Normal right ventricular size and function.   5. Moderate pericardial effusion.   6. Moderate pleural effusion in both left and right lateral regions.   7. Mild mitral valve regurgitation.   8. Sclerotic aortic valve with normal opening.   9. Questionable llambl's visualized on the aortic valve.  10. Mildly dilated pulmonary artery.    [id number] Elio Hernandes MD, MD Electronically signed on 3/14/2018 at   9:55:28 AM                         8.4    7.7   )-----------( 546      ( 16 Mar 2018 07:22 )             25.5     03-16    133<L>  |  96<L>  |  42.0<H>  ----------------------------<  130<H>  4.6   |  24.0  |  4.41<H>    Ca    8.4<L>      16 Mar 2018 07:22  Phos  6.5     03-15

## 2018-03-16 NOTE — PROGRESS NOTE ADULT - SUBJECTIVE AND OBJECTIVE BOX
Patient is a 57y old  Female who presents with a chief complaint of dark stools (14 Mar 2018 02:54)    INTERVAL HPI/OVERNIGHT EVENTS:  57y  Female seen and examined at bedside. Patient states she was able to sleep, no further episodes of nausea, vomiting or hematemesis, hasn't had any bowel movements yet, reports improvement of her shortness of breath, denies headache, abdominal pain, lower extremity edema or other symptoms. Otherwise ROS unremarkable.     Vital Signs Last 24 Hrs  T(C): 36.7 (15 Mar 2018 05:51), Max: 36.9 (14 Mar 2018 16:30)  T(F): 98 (15 Mar 2018 05:51), Max: 98.4 (14 Mar 2018 16:30)  HR: 83 (15 Mar 2018 05:51) (64 - 86)  BP: 167/85 (15 Mar 2018 05:51) (140/84 - 167/85)  BP(mean): --  RR: 18 (15 Mar 2018 05:51) (18 - 18)  SpO2: 94% (15 Mar 2018 05:51) (94% - 98%)    PHYSICAL EXAM:    	Constitutional: NAD, laying in bed, pale-appearing  	HEENT: PERRLA, EOMI ; conjunctival pallor  	Neck: No JVD, supple  	Back: Normal spine flexure, No CVA tenderness  	Respiratory: Normal respiratory rate and effort, inspiratory and expiratory crackles in lower lobes b/l, decreased breath sounds at bases ; no wheezing, rhonchi  	Cardiovascular: S1 and S2, RRR, no m/r/g; Chest pain reproducible to palpation across sternum in mid-sternal region extending to left parasternal region, 2nd -4th costal cartilages, pericardial rub noted  	Gastrointestinal: BS+ normoactive, soft, ND, NTTP   	Extremities: +2 pitting edema b/l up to knees  	Vascular: 2+ peripheral pulses; cap refill >2 sec, pallor to extremities and nailbeds  	Neurological: AAO x 3, no focal deficits  	Psychiatric: Normal mood, normal affect  	Musculoskeletal: 5/5 strength b/l upper and lower extremities  Skin: No rashes, no diaphoresis Patient is a 57y old  Female who presents with a chief complaint of dark stools (14 Mar 2018 02:54)    INTERVAL HPI/OVERNIGHT EVENTS:  57y  Female seen and examined at bedside. Patient states she was able to sleep, no further episodes of nausea, vomiting or hematemesis, hasn't had any bowel movements yet, reports improvement of her shortness of breath, denies headache, abdominal pain, lower extremity edema or other symptoms. She states that she feels overwhelmed with her clinical course but states that she emotionally relies on the support of her children. Otherwise ROS unremarkable.    Vital Signs Last 24 Hrs  T(C): 36.4 (16 Mar 2018 13:50), Max: 36.7 (16 Mar 2018 06:16)  T(F): 97.6 (16 Mar 2018 13:50), Max: 98.1 (16 Mar 2018 06:16)  HR: 74 (16 Mar 2018 13:50) (72 - 81)  BP: 132/75 (16 Mar 2018 13:50) (130/68 - 164/80)  BP(mean): --  RR: 18 (16 Mar 2018 13:50) (18 - 18)  SpO2: 90% (16 Mar 2018 13:50) (90% - 95%)    PHYSICAL EXAM:    	Constitutional: NAD, laying in bed, pale-appearing  	HEENT: PERRLA, EOMI ; conjunctival pallor  	Neck: No JVD, supple  	Back: Normal spine flexure, No CVA tenderness  	Respiratory: Normal respiratory rate and effort, inspiratory and expiratory crackles in lower lobes b/l, decreased breath sounds at bases ; no wheezing, rhonchi  	Cardiovascular: S1 and S2, RRR, no m/r/g; Chest pain reproducible to palpation across sternum in mid-sternal region extending to left parasternal region, 2nd -4th costal cartilages, no pericardial rub  	Gastrointestinal: BS+ normoactive, soft, ND, NTTP   	Extremities: +1 pitting edema b/l up to knees  	Vascular: 2+ peripheral pulses; cap refill >2 sec, pallor to extremities and nailbeds  	Neurological: AAO x 3, no focal deficits  	Psychiatric: Normal mood, normal affect  	Musculoskeletal: 5/5 strength b/l upper and lower extremities  Skin: No rashes, no diaphoresis                          8.4    7.7   )-----------( 546      ( 16 Mar 2018 07:22 )             25.5     03-16    133<L>  |  96<L>  |  42.0<H>  ----------------------------<  130<H>  4.6   |  24.0  |  4.41<H>    Ca    8.4<L>      16 Mar 2018 07:22  Phos  6.5     03-15 Patient is a 57y old  Female who presents with a chief complaint of N/V and dark stools noted to be in acute pulm edema/pericardial effusion with ARF requiring HD (14 Mar 2018 02:54)    INTERVAL HPI/OVERNIGHT EVENTS:  Patient seen and examined at bedside. No acute events overnight. Patient states she was able to sleep, no further episodes of nausea, vomiting or hematemesis, hasn't had any bowel movements yet, reports improvement of her shortness of breath from before. Still feels at times that she is unable to catch her breath. Understands that she will need dialysis again today and that her femoral line in the groin will eventually need to be switched to a PC.  PT denies headache, abdominal pain, lower extremity edema or other symptoms. She states that she feels overwhelmed with her clinical course but states that she emotionally relies on the support of her children. Otherwise ROS unremarkable.    Vital Signs Last 24 Hrs  T(C): 36.4 (16 Mar 2018 13:50), Max: 36.7 (16 Mar 2018 06:16)  T(F): 97.6 (16 Mar 2018 13:50), Max: 98.1 (16 Mar 2018 06:16)  HR: 74 (16 Mar 2018 13:50) (72 - 81)  BP: 132/75 (16 Mar 2018 13:50) (130/68 - 164/80)  BP(mean): --  RR: 18 (16 Mar 2018 13:50) (18 - 18)  SpO2: 90% (16 Mar 2018 13:50) (90% - 95%)    PHYSICAL EXAM:  	Constitutional: NAD, laying in bed, pale-appearing  	HEENT: PERRLA, EOMI ; conjunctival pallor  	Neck: No JVD, supple  	Back: Normal spine flexure, No CVA tenderness  	Respiratory: Tachypneic, Bibasilar crackles b/l, no WRR   	Cardiovascular: S1 and S2, RRR, no m/r/g   	Gastrointestinal: BS+ normoactive, soft, ND, NTTP   	Extremities: +1 pitting edema b/l up to knees  	Vascular: 2+ peripheral pulses; cap refill >2 sec, pallor to extremities and nailbeds  	Neurological: AAO x 3, no focal deficits  	Psychiatric: Normal mood, normal affect  	Musculoskeletal: 5/5 strength b/l upper and lower extremities    Skin: No rashes, no diaphoresis   Left forearm PPD circled 0mm     R femoral groin line in place  C/D/I                           8.4    7.7   )-----------( 546      ( 16 Mar 2018 07:22 )             25.5     03-16    133<L>  |  96<L>  |  42.0<H>  ----------------------------<  130<H>  4.6   |  24.0  |  4.41<H>    Ca    8.4<L>      16 Mar 2018 07:22  Phos  6.5     03-15      Imaging:   No new imaging studies

## 2018-03-16 NOTE — PROGRESS NOTE ADULT - ATTENDING COMMENTS
Dispo: When respiratory status improved OOB to chair, pt is able to ambulate. D/w CM and SW pt will need HD seat in the community.         Note addended where needed

## 2018-03-16 NOTE — PROGRESS NOTE ADULT - PROBLEM SELECTOR PLAN 4
-bp now better controlled   -c/w clonidine and will up titrate as needed  -c/w hydralazine 25mg po q8hrs   -holding ace inhibitor due to arf - Rate controlled and sinus rhythm with current dose of Diltiazem  - Will follow cardiology recommendations. - Rate controlled and currently in sinus rhythm   -c/w cardizem 30mg po q6hrs  -no AC at this time until GI work up completed

## 2018-03-16 NOTE — PROGRESS NOTE ADULT - ASSESSMENT
Ms. Elizabeth is a 56 yo Romanian speaking female with hx of hypertension, hyperlipidemia, type 2 diabetes, with 1 month hx of vomiting and abdominal pain, with NSAID use.  Developed pleuritic chest pain, worsened in supine position and with deep breaths.  Also noted to be anemic.    1. abdominal pain/nausea/vomiting- being evaluated by GI, PPI, clear liquids, possible endoscopy  2. chest pain -  Her troponin is elevated, but CK is normal.  This is not consistent with acute coronary syndrome.  May have had demand ischemia in setting of blood loss anemia.  Probable underlying gastritis with concomitant pericarditis (pericardial rub, etc.).   Now on colchicine. Keep Hb > 9.  Monitor H& H.  Reviewed echo there are no regional wall motion abnormalities.  Would prioritize GI workup.  Will need eventual ischemia evaluation.  PPI  3. anemia - probably chronic or subacute .  GI workup.  CKD  Keep Hb > 9  4. hypertension   5. pleural effusions/pericardial effusions - will evaluate for HFPEF, ? exudative in nature.  Consider diagnostic thoracentesis.  6. acute on chronic renal insufficiency -   for HD today    Thank you for allowing me to participate in your patient's care.  Please call with questions.   D/W Dr. Agee Ms. Elizabeth is a 58 yo Yi speaking female with hx of hypertension, hyperlipidemia, type 2 diabetes, with 1 month hx of vomiting and abdominal pain, with NSAID use.  Developed pleuritic chest pain, worsened in supine position and with deep breaths.  Also noted to be anemic.    1. abdominal pain/nausea/vomiting- being evaluated by GI, PPI, clear liquids, possible endoscopy  2. chest pain -  Her troponin is elevated, but CK is normal.  This is not consistent with acute coronary syndrome.  May have had demand ischemia in setting of blood loss anemia.  Probable underlying gastritis with concomitant pericarditis (pericardial rub, etc.).   Now on colchicine. Keep Hb > 9.  Monitor H& H.  Reviewed echo there are no regional wall motion abnormalities.  Very difficult to obtain a reliable history.  Given overall picture, elevated troponin, continued chest pain, needs ischemia evaluation.   3. anemia - probably chronic or subacute .  GI workup.  CKD  Keep Hb > 9  4. hypertension   5. pleural effusions/pericardial effusions - will evaluate for HFPEF, ? exudative in nature.  Consider diagnostic thoracentesis.  6. acute on chronic renal insufficiency -   for HD today  7. tachypneic - fluid removal with HD, check CXR, ? need for thoracentesis  8. paroxysmal atrial fibrillation - newly diagnosed; no AC for now given anemia, will need GI clearance; appreciate EP input regarding [facility]LL[facility] closure candidacy    Thank you for allowing me to participate in your patient's care.  Please call with questions.   D/W Dr. Agee and Dr. Stephens

## 2018-03-16 NOTE — PROGRESS NOTE ADULT - PROBLEM SELECTOR PLAN 5
HBA1C: 7.6   fs stable  -c/w accuchecks achs tid   -c/w HSS  -c/w hypoglycemia protocol -bp now better controlled   -c/w clonidine and will up titrate as needed  -c/w hydralazine 25mg po q8hrs   -holding ace inhibitor due to arf -bp stable   -c/w clonidine and will up titrate as needed  -c/w hydralazine 25mg po q8hrs

## 2018-03-16 NOTE — DISCHARGE NOTE ADULT - CARE PLAN
Principal Discharge DX:	Acute on chronic renal insufficiency Principal Discharge DX:	Acute on chronic renal insufficiency  Assessment and plan of treatment:	The symptoms of nausea and vomiting were likely due to the worsening of your kidney function, your kidney levels in blood were above your baseline, initially we gave you medication to increase your urination and try to improve your kidney levels, however, since a pericardial rub was found on physical examination, which is indicative of inflammation of the pericardium, you required emergency hemodialysis and you will likely require to continue getting hemodialysis regularly in the long term.  Secondary Diagnosis:	Fluid overload  Goal:	Decrease in your symptoms.  Assessment and plan of treatment:	When the kidney is not working properly, fluid can accumulate in the body, in your case this happened in your lungs and around your heart, you will require hemodialysis to prevent further accumulation of fluid.  Secondary Diagnosis:	Anemia  Assessment and plan of treatment:	The decreased in your kidney function was likely the cause of your nausea and vomiting, patient that are vomiting frequently and for long periods of time can develop tears in the mucosa of the esophagus and can cause blood vomiting. In addition, the kidney is the organ in charged of producing a hormone that stimulates the production of red blood cells. These two factors combined caused you to be very anemic and that's why you required blood transfusion. After discharge, you will need to follow up with your nephrologist (Kidney doctor) to monitor your Hemoglobin and to receive medications/supplements that can increase your hemoglobin levels.  Secondary Diagnosis:	New onset atrial fibrillation  Assessment and plan of treatment:	During you first hemodialysis session, you developed an abnormal heart rhythm known as atrial fibrillation which causes your heart to beat very fast and irregularly which can be dangerous since clots can form in your heart and travel to other parts of the body. Which is why you received a medication to slow down your heart rate.  Secondary Diagnosis:	Essential hypertension  Assessment and plan of treatment:	You will need to take your blood pressure medications as indicated above and to follow the diet described to control the hypertension. Follow up with your primary care doctor to monitor this.  Secondary Diagnosis:	Diabetes mellitus  Assessment and plan of treatment:	You will need to take your blood pressure medications as indicated above and to follow the diet described to control the hypertension. Follow up with your primary care doctor to monitor this. Principal Discharge DX:	Acute on chronic renal insufficiency  Goal:	follow up  Assessment and plan of treatment:	The symptoms of nausea and vomiting were likely due to the worsening of your kidney function, your kidney levels in blood were above your baseline, initially we gave you medication to increase your urination and try to improve your kidney levels, however, since a pericardial rub was found on physical examination, which is indicative of inflammation of the pericardium, you required emergency hemodialysis and you will likely require to continue getting hemodialysis regularly in the long term.    Be sure to go for dialysis on 4/4/18. Be sure to follow with nephrology for your next session.  Secondary Diagnosis:	Fluid overload  Goal:	Decrease in your symptoms.  Assessment and plan of treatment:	When the kidney is not working properly, fluid can accumulate in the body, in your case this happened in your lungs and around your heart, you will require hemodialysis to prevent further accumulation of fluid.    Be sure not to miss hemodialysis.  Secondary Diagnosis:	Anemia  Assessment and plan of treatment:	The decreased in your kidney function was likely the cause of your nausea and vomiting, patient that are vomiting frequently and for long periods of time can develop tears in the mucosa of the esophagus and can cause blood vomiting. In addition, the kidney is the organ in charged of producing a hormone that stimulates the production of red blood cells. These two factors combined caused you to be very anemic and that's why you required blood transfusion. After discharge, you will need to follow up with your nephrologist (Kidney doctor) to monitor your Hemoglobin and to receive medications/supplements that can increase your hemoglobin levels.  Secondary Diagnosis:	New onset atrial fibrillation  Assessment and plan of treatment:	During you first hemodialysis session, you developed an abnormal heart rhythm known as atrial fibrillation which causes your heart to beat very fast and irregularly which can be dangerous since clots can form in your heart and travel to other parts of the body. Which is why you received a medication to slow down your heart rate.    Caution for signs of abnormal bleeding while on anticoagulation, this includes but is not limited to: excessively bleeding gums or nose bleeds, bleeding into urine, bleeding into stool (bright red blood or dark tarry colored stools), excessive bleeding after trauma or cuts. If this occur seek medical intervention immediately.     Be sure to see the cardiologist in the office. you will need to have a prescription for coumadin given by them, until you are seen in the office, do NOT take coumadin as your INR, blood number for coumadin, is already a little bit higher than it should be. Be sure to discuss coumadin with them.  Secondary Diagnosis:	Essential hypertension  Assessment and plan of treatment:	You will need to take your blood pressure medications as indicated above and to follow the diet described to control the hypertension. Follow up with your primary care doctor to monitor this.  Secondary Diagnosis:	Diabetes mellitus  Assessment and plan of treatment:	You will need to take your blood pressure medications as indicated above and to follow the diet described to control the hypertension. Follow up with your primary care doctor to monitor this. Principal Discharge DX:	Acute on chronic renal insufficiency  Goal:	follow up  Assessment and plan of treatment:	The symptoms of nausea and vomiting were likely due to the worsening of your kidney function, your kidney levels in blood were above your baseline, initially we gave you medication to increase your urination and try to improve your kidney levels, however, since a pericardial rub was found on physical examination, which is indicative of inflammation of the pericardium, you required emergency hemodialysis and you will likely require to continue getting hemodialysis regularly in the long term.    Be sure to go for dialysis on 4/4/18. Be sure to follow with nephrology for your next session.  Secondary Diagnosis:	Fluid overload  Goal:	Decrease in your symptoms.  Assessment and plan of treatment:	When the kidney is not working properly, fluid can accumulate in the body, in your case this happened in your lungs and around your heart, you will require hemodialysis to prevent further accumulation of fluid.    Be sure not to miss hemodialysis.  Secondary Diagnosis:	Anemia  Assessment and plan of treatment:	The decreased in your kidney function was likely the cause of your nausea and vomiting, patient that are vomiting frequently and for long periods of time can develop tears in the mucosa of the esophagus and can cause blood vomiting. In addition, the kidney is the organ in charged of producing a hormone that stimulates the production of red blood cells. These two factors combined caused you to be very anemic and that's why you required blood transfusion. After discharge, you will need to follow up with your nephrologist (Kidney doctor) to monitor your Hemoglobin and to receive medications/supplements that can increase your hemoglobin levels.  Secondary Diagnosis:	New onset atrial fibrillation  Assessment and plan of treatment:	During you first hemodialysis session, you developed an abnormal heart rhythm known as atrial fibrillation which causes your heart to beat very fast and irregularly which can be dangerous since clots can form in your heart and travel to other parts of the body. Which is why you received a medication to slow down your heart rate.    Caution for signs of abnormal bleeding while on anticoagulation, this includes but is not limited to: excessively bleeding gums or nose bleeds, bleeding into urine, bleeding into stool (bright red blood or dark tarry colored stools), excessive bleeding after trauma or cuts. If this occur seek medical intervention immediately.     Be sure to see your primary medical doctor in the office. you will need to have a prescription for coumadin given by them, until you are seen in the office, do NOT take coumadin as your INR, blood number for coumadin, is already a little bit higher than it should be. Be sure to discuss coumadin with them.  Secondary Diagnosis:	Essential hypertension  Assessment and plan of treatment:	You will need to take your blood pressure medications as indicated above and to follow the diet described to control the hypertension. Follow up with your primary care doctor to monitor this.  Secondary Diagnosis:	Diabetes mellitus  Assessment and plan of treatment:	You will need to take your blood pressure medications as indicated above and to follow the diet described to control the hypertension. Follow up with your primary care doctor to monitor this. Principal Discharge DX:	Acute on chronic renal insufficiency  Goal:	follow up  Assessment and plan of treatment:	The symptoms of nausea and vomiting were likely due to the worsening of your kidney function, your kidney levels in blood were above your baseline, initially we gave you medication to increase your urination and try to improve your kidney levels, however, since a pericardial rub was found on physical examination, which is indicative of inflammation of the pericardium, you required emergency hemodialysis and you will likely require to continue getting hemodialysis regularly in the long term.    Be sure to go for dialysis on 4/4/18. Be sure to follow with nephrology for your next session.  Secondary Diagnosis:	Fluid overload  Goal:	Decrease in your symptoms.  Assessment and plan of treatment:	When the kidney is not working properly, fluid can accumulate in the body, in your case this happened in your lungs and around your heart, you will require hemodialysis to prevent further accumulation of fluid.    Be sure not to miss hemodialysis.  Secondary Diagnosis:	Anemia  Assessment and plan of treatment:	The decreased in your kidney function was likely the cause of your nausea and vomiting, patient that are vomiting frequently and for long periods of time can develop tears in the mucosa of the esophagus and can cause blood vomiting. In addition, the kidney is the organ in charged of producing a hormone that stimulates the production of red blood cells. These two factors combined caused you to be very anemic and that's why you required blood transfusion. After discharge, you will need to follow up with your nephrologist (Kidney doctor) to monitor your Hemoglobin and to receive medications/supplements that can increase your hemoglobin levels.  Keep your hemoglobin above 9 as per cardiology.  Secondary Diagnosis:	New onset atrial fibrillation  Assessment and plan of treatment:	During you first hemodialysis session, you developed an abnormal heart rhythm known as atrial fibrillation which causes your heart to beat very fast and irregularly which can be dangerous since clots can form in your heart and travel to other parts of the body. Which is why you received a medication to slow down your heart rate.    Caution for signs of abnormal bleeding while on anticoagulation, this includes but is not limited to: excessively bleeding gums or nose bleeds, bleeding into urine, bleeding into stool (bright red blood or dark tarry colored stools), excessive bleeding after trauma or cuts. If this occur seek medical intervention immediately.     Be sure to see your primary medical doctor in the office. you will need to have a prescription for coumadin given by them, until you are seen in the office, do NOT take coumadin as your INR, blood number for coumadin, is already a little bit higher than it should be. Be sure to discuss coumadin with them.  Secondary Diagnosis:	Essential hypertension  Assessment and plan of treatment:	You will need to take your blood pressure medications as indicated above and to follow the diet described to control the hypertension. Follow up with your primary care doctor to monitor this.  Secondary Diagnosis:	Diabetes mellitus  Assessment and plan of treatment:	You will need to take your blood pressure medications as indicated above and to follow the diet described to control the hypertension. Follow up with your primary care doctor to monitor this.

## 2018-03-16 NOTE — PROGRESS NOTE ADULT - PROBLEM SELECTOR PLAN 1
Eventual GI when medically optimized. Cardiology note appreciated but pt. appears tachypneic this AM and in no condition for IV sedation for GI w/u presently. Repeat labs ordered for tomorrow AM.

## 2018-03-16 NOTE — DISCHARGE NOTE ADULT - CARE PROVIDERS DIRECT ADDRESSES
,DirectAddress_Unknown,[email or web address],DirectAddress_Unknown,DirectAddress_Unknown

## 2018-03-16 NOTE — PROGRESS NOTE ADULT - SUBJECTIVE AND OBJECTIVE BOX
NEPHROLOGY INTERVAL HPI/OVERNIGHT EVENTS:    Examined earlier   second HD today    MEDICATIONS  (STANDING):  artificial  tears Solution 1 Drop(s) Both EYES every 12 hours  cloNIDine 0.1 milliGRAM(s) Oral two times a day  colchicine 0.6 milliGRAM(s) Oral two times a day  dextrose 5%. 1000 milliLiter(s) (50 mL/Hr) IV Continuous <Continuous>  dextrose 50% Injectable 12.5 Gram(s) IV Push once  dextrose 50% Injectable 25 Gram(s) IV Push once  dextrose 50% Injectable 25 Gram(s) IV Push once  diltiazem    Tablet 30 milliGRAM(s) Oral every 6 hours  hydrALAZINE 25 milliGRAM(s) Oral every 8 hours  insulin lispro (HumaLOG) corrective regimen sliding scale   SubCutaneous three times a day before meals  insulin lispro (HumaLOG) corrective regimen sliding scale   SubCutaneous at bedtime  iron sucrose IVPB 200 milliGRAM(s) IV Intermittent daily  pantoprazole    Tablet 40 milliGRAM(s) Oral two times a day  simvastatin 40 milliGRAM(s) Oral at bedtime    MEDICATIONS  (PRN):  acetaminophen   Tablet 650 milliGRAM(s) Oral every 6 hours PRN For Temp greater than 38 C (100.4 F)  acetaminophen   Tablet. 650 milliGRAM(s) Oral every 6 hours PRN Moderate Pain (4 - 6)  dextrose Gel 1 Dose(s) Oral once PRN Blood Glucose LESS THAN 70 milliGRAM(s)/deciliter  glucagon  Injectable 1 milliGRAM(s) IntraMuscular once PRN Glucose LESS THAN 70 milligrams/deciliter  ondansetron Injectable 4 milliGRAM(s) IV Push every 6 hours PRN Nausea      Allergies    No Known Allergies    Intolerances        Vital Signs Last 24 Hrs  T(C): 36.4 (16 Mar 2018 13:50), Max: 36.7 (16 Mar 2018 06:16)  T(F): 97.6 (16 Mar 2018 13:50), Max: 98.1 (16 Mar 2018 06:16)  HR: 74 (16 Mar 2018 13:50) (72 - 81)  BP: 132/75 (16 Mar 2018 13:50) (130/68 - 164/80)  BP(mean): --  RR: 18 (16 Mar 2018 13:50) (18 - 18)  SpO2: 90% (16 Mar 2018 13:50) (90% - 95%)  Daily     Daily Weight in k (16 Mar 2018 13:50)    PHYSICAL EXAM:  GENERAL: appears chronically ill  HEAD:  wnl  EYES: same  ENMT:   NECK: veins wnl  NERVOUS SYSTEM:  oriented, verbal  CHEST/LUNG: decreased bs bases  HEART: + rub noted this am  ABDOMEN: soft  EXTREMITIES: leg edema same      LABS:                        8.4    7.7   )-----------( 546      ( 16 Mar 2018 07:22 )             25.5     03-16    133<L>  |  96<L>  |  42.0<H>  ----------------------------<  130<H>  4.6   |  24.0  |  4.41<H>    Ca    8.4<L>      16 Mar 2018 07:22  Phos  6.5     -15          C4 Complement, Serum: 40 mg/dL (03-15 @ 20:51)  C3 Complement, Serum: 128 mg/dL (03-15 @ 20:51)    ABG - ( 14 Mar 2018 19:02 )  pH: 7.48  /  pCO2: 29    /  pO2: 65    / HCO3: 24    / Base Excess: -0.9  /  SaO2: 95                    RADIOLOGY & ADDITIONAL TESTS:

## 2018-03-16 NOTE — PROGRESS NOTE ADULT - PROBLEM SELECTOR PLAN 3
likely multifactorial due to worsening acute on chronic renal insufficiency in the setting of CKD,  suspected GI bleed (initially with coffee ground emesis) but occult stool negative and degree of AOCD/ iron deficiency   -s/p 2 units pRBC with improvement in h/h   -last colonoscopy /EGD 2017 WNL as per patient  -occult stool neg  -will c/w monitoring h/h   -anemia panel noted with degree iron deficiency   -c/w ppi po bid   -d/c iron sucrose and will start ferrous sulfate 325mg po qd   -pt could benefit from epogen   -GI f/u noted and appreciated and recommended conservative management at this time and leave on clear liquid diet likely multifactorial due to worsening acute on chronic renal insufficiency in the setting of CKD,  suspected GI bleed (initially with coffee ground emesis) but occult stool negative and degree of AOCD/ iron deficiency   -s/p 2 units pRBC on admission with improvement in h/h has remained stable   -last colonoscopy /EGD 2017 WNL as per patient  -occult stool neg  -will c/w monitoring h/h   -anemia panel noted with degree iron deficiency   -c/w ppi po bid   -c/w iron sucrose as per nephro and eventual change to ferrous sulfate   -pt could benefit from epogen   -GI f/u noted and appreciated and recommended conservative management at this time and leave on clear liquid diet when respiratory status improved will likely proceed with endoscopic intervention

## 2018-03-16 NOTE — CHART NOTE - NSCHARTNOTEFT_GEN_A_CORE
Presented Mrs. Elizabeth's case to vascular surgery NP Deepti for placement of Permacath, patient currently with central line on her right femoral vein and receiving hemodialysis daily.  Vascular surgery will follow up the patient during the weekend and will schedule placement of Permacath for Monday.

## 2018-03-16 NOTE — PROGRESS NOTE ADULT - ASSESSMENT
MARSHALL on CKD 2/2 DM HTN now requiring HD  Second treatment today  Will eval in AM for possible HD again kiara  Cards/ GI noted

## 2018-03-17 LAB
ANION GAP SERPL CALC-SCNC: 15 MMOL/L — SIGNIFICANT CHANGE UP (ref 5–17)
BASOPHILS # BLD AUTO: 0 K/UL — SIGNIFICANT CHANGE UP (ref 0–0.2)
BASOPHILS NFR BLD AUTO: 0.3 % — SIGNIFICANT CHANGE UP (ref 0–2)
BUN SERPL-MCNC: 26 MG/DL — HIGH (ref 8–20)
CALCIUM SERPL-MCNC: 8.1 MG/DL — LOW (ref 8.6–10.2)
CHLORIDE SERPL-SCNC: 96 MMOL/L — LOW (ref 98–107)
CO2 SERPL-SCNC: 24 MMOL/L — SIGNIFICANT CHANGE UP (ref 22–29)
CREAT SERPL-MCNC: 3.78 MG/DL — HIGH (ref 0.5–1.3)
EOSINOPHIL # BLD AUTO: 0.2 K/UL — SIGNIFICANT CHANGE UP (ref 0–0.5)
EOSINOPHIL NFR BLD AUTO: 3.6 % — SIGNIFICANT CHANGE UP (ref 0–6)
GLUCOSE BLDC GLUCOMTR-MCNC: 136 MG/DL — HIGH (ref 70–99)
GLUCOSE BLDC GLUCOMTR-MCNC: 170 MG/DL — HIGH (ref 70–99)
GLUCOSE BLDC GLUCOMTR-MCNC: 175 MG/DL — HIGH (ref 70–99)
GLUCOSE BLDC GLUCOMTR-MCNC: 237 MG/DL — HIGH (ref 70–99)
GLUCOSE SERPL-MCNC: 166 MG/DL — HIGH (ref 70–115)
HCT VFR BLD CALC: 26 % — LOW (ref 37–47)
HGB BLD-MCNC: 8.5 G/DL — LOW (ref 12–16)
LYMPHOCYTES # BLD AUTO: 0.7 K/UL — LOW (ref 1–4.8)
LYMPHOCYTES # BLD AUTO: 12.5 % — LOW (ref 20–55)
MCHC RBC-ENTMCNC: 28.4 PG — SIGNIFICANT CHANGE UP (ref 27–31)
MCHC RBC-ENTMCNC: 32.7 G/DL — SIGNIFICANT CHANGE UP (ref 32–36)
MCV RBC AUTO: 87 FL — SIGNIFICANT CHANGE UP (ref 81–99)
MONOCYTES # BLD AUTO: 0.7 K/UL — SIGNIFICANT CHANGE UP (ref 0–0.8)
MONOCYTES NFR BLD AUTO: 11.4 % — HIGH (ref 3–10)
NEUTROPHILS # BLD AUTO: 4.1 K/UL — SIGNIFICANT CHANGE UP (ref 1.8–8)
NEUTROPHILS NFR BLD AUTO: 71.5 % — SIGNIFICANT CHANGE UP (ref 37–73)
PLATELET # BLD AUTO: 469 K/UL — HIGH (ref 150–400)
POTASSIUM SERPL-MCNC: 4.2 MMOL/L — SIGNIFICANT CHANGE UP (ref 3.5–5.3)
POTASSIUM SERPL-SCNC: 4.2 MMOL/L — SIGNIFICANT CHANGE UP (ref 3.5–5.3)
RBC # BLD: 2.99 M/UL — LOW (ref 4.4–5.2)
RBC # FLD: 13 % — SIGNIFICANT CHANGE UP (ref 11–15.6)
SODIUM SERPL-SCNC: 135 MMOL/L — SIGNIFICANT CHANGE UP (ref 135–145)
WBC # BLD: 5.8 K/UL — SIGNIFICANT CHANGE UP (ref 4.8–10.8)
WBC # FLD AUTO: 5.8 K/UL — SIGNIFICANT CHANGE UP (ref 4.8–10.8)

## 2018-03-17 PROCEDURE — 99233 SBSQ HOSP IP/OBS HIGH 50: CPT

## 2018-03-17 PROCEDURE — 99232 SBSQ HOSP IP/OBS MODERATE 35: CPT

## 2018-03-17 PROCEDURE — 99233 SBSQ HOSP IP/OBS HIGH 50: CPT | Mod: GC

## 2018-03-17 RX ORDER — SENNA PLUS 8.6 MG/1
2 TABLET ORAL ONCE
Qty: 0 | Refills: 0 | Status: COMPLETED | OUTPATIENT
Start: 2018-03-17 | End: 2018-03-17

## 2018-03-17 RX ORDER — DOCUSATE SODIUM 100 MG
100 CAPSULE ORAL
Qty: 0 | Refills: 0 | Status: DISCONTINUED | OUTPATIENT
Start: 2018-03-17 | End: 2018-03-26

## 2018-03-17 RX ORDER — ERYTHROPOIETIN 10000 [IU]/ML
10000 INJECTION, SOLUTION INTRAVENOUS; SUBCUTANEOUS ONCE
Qty: 0 | Refills: 0 | Status: COMPLETED | OUTPATIENT
Start: 2018-03-17 | End: 2018-03-17

## 2018-03-17 RX ORDER — SENNA PLUS 8.6 MG/1
2 TABLET ORAL AT BEDTIME
Qty: 0 | Refills: 0 | Status: DISCONTINUED | OUTPATIENT
Start: 2018-03-18 | End: 2018-03-26

## 2018-03-17 RX ORDER — METOCLOPRAMIDE HCL 10 MG
10 TABLET ORAL EVERY 12 HOURS
Qty: 0 | Refills: 0 | Status: DISCONTINUED | OUTPATIENT
Start: 2018-03-17 | End: 2018-03-28

## 2018-03-17 RX ORDER — POLYETHYLENE GLYCOL 3350 17 G/17G
17 POWDER, FOR SOLUTION ORAL DAILY
Qty: 0 | Refills: 0 | Status: DISCONTINUED | OUTPATIENT
Start: 2018-03-17 | End: 2018-03-17

## 2018-03-17 RX ORDER — ASPIRIN/CALCIUM CARB/MAGNESIUM 324 MG
81 TABLET ORAL DAILY
Qty: 0 | Refills: 0 | Status: DISCONTINUED | OUTPATIENT
Start: 2018-03-17 | End: 2018-03-28

## 2018-03-17 RX ORDER — POLYETHYLENE GLYCOL 3350 17 G/17G
17 POWDER, FOR SOLUTION ORAL DAILY
Qty: 0 | Refills: 0 | Status: DISCONTINUED | OUTPATIENT
Start: 2018-03-17 | End: 2018-03-26

## 2018-03-17 RX ADMIN — Medication 0.1 MILLIGRAM(S): at 18:53

## 2018-03-17 RX ADMIN — PANTOPRAZOLE SODIUM 40 MILLIGRAM(S): 20 TABLET, DELAYED RELEASE ORAL at 17:30

## 2018-03-17 RX ADMIN — SIMVASTATIN 40 MILLIGRAM(S): 20 TABLET, FILM COATED ORAL at 21:05

## 2018-03-17 RX ADMIN — Medication 0.6 MILLIGRAM(S): at 05:44

## 2018-03-17 RX ADMIN — TUBERCULIN PURIFIED PROTEIN DERIVATIVE 5 UNIT(S): 5 INJECTION, SOLUTION INTRADERMAL at 20:38

## 2018-03-17 RX ADMIN — ONDANSETRON 4 MILLIGRAM(S): 8 TABLET, FILM COATED ORAL at 12:55

## 2018-03-17 RX ADMIN — Medication 1 DROP(S): at 05:44

## 2018-03-17 RX ADMIN — SENNA PLUS 2 TABLET(S): 8.6 TABLET ORAL at 10:32

## 2018-03-17 RX ADMIN — Medication 100 MILLIGRAM(S): at 20:43

## 2018-03-17 RX ADMIN — Medication 0.6 MILLIGRAM(S): at 20:44

## 2018-03-17 RX ADMIN — PANTOPRAZOLE SODIUM 40 MILLIGRAM(S): 20 TABLET, DELAYED RELEASE ORAL at 05:44

## 2018-03-17 RX ADMIN — Medication 25 MILLIGRAM(S): at 14:22

## 2018-03-17 RX ADMIN — Medication 2: at 12:50

## 2018-03-17 RX ADMIN — Medication 0.1 MILLIGRAM(S): at 05:44

## 2018-03-17 RX ADMIN — Medication 1 DROP(S): at 20:44

## 2018-03-17 RX ADMIN — ERYTHROPOIETIN 10000 UNIT(S): 10000 INJECTION, SOLUTION INTRAVENOUS; SUBCUTANEOUS at 18:49

## 2018-03-17 RX ADMIN — IRON SUCROSE 220 MILLIGRAM(S): 20 INJECTION, SOLUTION INTRAVENOUS at 12:51

## 2018-03-17 RX ADMIN — Medication 1: at 10:32

## 2018-03-17 RX ADMIN — Medication 25 MILLIGRAM(S): at 21:05

## 2018-03-17 RX ADMIN — Medication 25 MILLIGRAM(S): at 05:44

## 2018-03-17 RX ADMIN — Medication 10 MILLIGRAM(S): at 17:32

## 2018-03-17 NOTE — PROGRESS NOTE ADULT - PROBLEM SELECTOR PLAN 2
Bilateral pleural effusions and pericardial effusion, noted tte with preserved EF, likely due to worsening renal failure but unclear if component of HFPEF. Now improving clincally after HD sessions, lungs clear  -tte shows preserved EF and moderate pericardial effusion   -c/w supplemental o2 as needed   -c/w colchicine 0.6mg po bid for presumed pericarditis   - s/p 2 sessions of daily hemodialysis (started on 03/15/2018), may require today depending on nephro eval but patient clinically improved this morning.  -cardio f/u noted and appreciated and will need further ischemic evaluation with C possibly monday. Will need to arrange with nephro to have HD thereafter.   -nephro f/u noted and appreciated

## 2018-03-17 NOTE — PROGRESS NOTE ADULT - SUBJECTIVE AND OBJECTIVE BOX
NEPHROLOGY INTERVAL HPI/OVERNIGHT EVENTS:    Examined   Third HD today    MEDICATIONS  (STANDING):  artificial  tears Solution 1 Drop(s) Both EYES every 12 hours  cloNIDine 0.1 milliGRAM(s) Oral two times a day  colchicine 0.6 milliGRAM(s) Oral two times a day  dextrose 5%. 1000 milliLiter(s) (50 mL/Hr) IV Continuous <Continuous>  dextrose 50% Injectable 12.5 Gram(s) IV Push once  dextrose 50% Injectable 25 Gram(s) IV Push once  dextrose 50% Injectable 25 Gram(s) IV Push once  diltiazem    Tablet 30 milliGRAM(s) Oral every 6 hours  hydrALAZINE 25 milliGRAM(s) Oral every 8 hours  insulin lispro (HumaLOG) corrective regimen sliding scale   SubCutaneous three times a day before meals  insulin lispro (HumaLOG) corrective regimen sliding scale   SubCutaneous at bedtime  iron sucrose IVPB 200 milliGRAM(s) IV Intermittent daily  pantoprazole    Tablet 40 milliGRAM(s) Oral two times a day  simvastatin 40 milliGRAM(s) Oral at bedtime    MEDICATIONS  (PRN):  acetaminophen   Tablet 650 milliGRAM(s) Oral every 6 hours PRN For Temp greater than 38 C (100.4 F)  acetaminophen   Tablet. 650 milliGRAM(s) Oral every 6 hours PRN Moderate Pain (4 - 6)  dextrose Gel 1 Dose(s) Oral once PRN Blood Glucose LESS THAN 70 milliGRAM(s)/deciliter  glucagon  Injectable 1 milliGRAM(s) IntraMuscular once PRN Glucose LESS THAN 70 milligrams/deciliter  ondansetron Injectable 4 milliGRAM(s) IV Push every 6 hours PRN Nausea      Allergies    No Known Allergies    Intolerances        Vital Signs Last 24 Hrs  T(C): 37.1 (17 Mar 2018 05:41), Max: 37.1 (17 Mar 2018 05:41)  T(F): 98.7 (17 Mar 2018 05:41), Max: 98.7 (17 Mar 2018 05:41)  HR: 75 (17 Mar 2018 05:41) (72 - 81)  BP: 153/76 (17 Mar 2018 05:41) (130/68 - 158/84)  BP(mean): --  RR: 18 (17 Mar 2018 05:41) (18 - 18)  SpO2: 94% (17 Mar 2018 05:41) (90% - 95%)  Daily     Daily Weight in k.4 (17 Mar 2018 05:41)    PHYSICAL EXAM:  GENERAL: appears chronically ill  HEAD: NCAT  EYES: EOMI  NECK: supple no JVD  NERVOUS SYSTEM:  A&O x 3 primarily Jordanian speaking  CHEST/LUNG: decreased bs bases  HEART: + rub +S1S2  ABDOMEN: soft  EXTREMITIES: trace LE edema    LABS:                        8.5    5.8   )-----------( 469      ( 17 Mar 2018 05:57 )             26.0     03-17    135  |  96<L>  |  26.0<H>  ----------------------------<  166<H>  4.2   |  24.0  |  3.78<H>    Ca    8.1<L>      17 Mar 2018 05:57                  RADIOLOGY & ADDITIONAL TESTS:

## 2018-03-17 NOTE — PROGRESS NOTE ADULT - ATTENDING COMMENTS
I have seen and examined the patient, reviewed the chart, labs and diagnostics and I agree with assessment and plan as above with resident team. note edited as above where required  continue HD for uremic pericarditis.  Needs Co ordination of Outpatient HD before we can discharge

## 2018-03-17 NOTE — PROGRESS NOTE ADULT - SUBJECTIVE AND OBJECTIVE BOX
[place] [facility] Faculty Practice                                                        Office: [address] [address]                                                       Telephone: [phone]. Fax:[phone]                                                                             PROGRESS NOTE  Intepreter present during interview    Still sob when she lies flat  Denies chest discomfort  For dialysis today via right fem cath  Denies any palpitations   No further atrial fib    PMH  Chronic renal failure  Chronic anemia  Aodm  Essential hypertension  Gout  Gerd  Atrial fib 3/16  	  Vitals:  T(C): 36.6 (03-17-18 @ 10:30), Max: 37.1 (03-17-18 @ 05:41)  HR: 84 (03-17-18 @ 10:30) (74 - 84)  BP: 148/64 (03-17-18 @ 10:30) (132/75 - 158/84)  RR: 18 (03-17-18 @ 10:30) (18 - 18)  SpO2: 93% (03-17-18 @ 10:30) (90% - 94%)  Wt(kg): --  I&O's Summary    16 Mar 2018 07:01  -  17 Mar 2018 07:00  --------------------------------------------------------  IN: 0 mL / OUT: 1100 mL / NET: -1100 mL      Weight (kg): 68 (03-13 @ 19:13)    PHYSICAL EXAM:  Appearance: Comfortable. No acute distress  Neck supple   Lungs slight crackle lower lobes  Heart s1&s2 regular 1/6 ramiro lsb  Abd soft non tender  right fem cath present  Ext no edema no calf tenderness  Neuro alert and oriented  leon freely      CURRENT MEDICATIONS:  cloNIDine 0.1 milliGRAM(s) Oral two times a day  diltiazem    Tablet 30 milliGRAM(s) Oral every 6 hours  hydrALAZINE 25 milliGRAM(s) Oral every 8 hours  simvastatin  metoclopramide Injectable  docusate sodium  pantoprazole    Tablet  colchicine  insulin lispro (HumaLOG) corrective regimen sliding scale  insulin lispro (HumaLOG) corrective regimen sliding scale    artificial  tears Solution  dextrose 5%.  iron sucrose IVPB      LABS:	 	                          8.5    5.8   )-----------( 469      ( 17 Mar 2018 05:57 )             26.0     03-17    135  |  96<L>  |  26.0<H>  ----------------------------<  166<H>  4.2   |  24.0  |  3.78<H>    Ca    8.1<L>      17 Mar 2018 05:57      proBNP: Serum Pro-Brain Natriuretic Peptide: 05770 pg/mL (03-14 @ 11:12)    Lipid Profile: Date: 03-14 @ 11:12  Total cholesterol 214; Direct LDL: 128; HDL: 63; Triglycerides:114    HgA1c: Hemoglobin A1C, Whole Blood: 7.5 %  TSH: Thyroid Stimulating Hormone, Serum: 6.95 uIU/mL    TELEMETRY: Nsr no arrhythmias      DIAGNOSTIC TESTING:  [x ] Echocardiogram:  1. Left ventricular ejection fraction, by visual estimation, is 60 to   65%.   2. Normal global left ventricular systolic function.   3. Spectral Doppler shows impaired relaxation pattern of left   ventricular myocardial filling (Grade I diastolic dysfunction).   4. Normal right ventricular size and function.   5. Moderate pericardial effusion.   6. Moderate pleural effusion in both left and right lateral regions.   7. Mild mitral valve regurgitation.   8. Sclerotic aortic valve with normal opening.   9. Questionable llambl's visualized on the aortic valve.  10. Mildly dilated pulmonary artery.    [ ]  Catheterization:  states she never was cathed    OTHER:

## 2018-03-17 NOTE — PROGRESS NOTE ADULT - SUBJECTIVE AND OBJECTIVE BOX
Patient is a 57y old  Female who presents with a chief complaint of N/V and dark stools noted to be in acute pulm edema/pericardial effusion with ARF requiring HD (14 Mar 2018 02:54)    INTERVAL HPI/OVERNIGHT EVENTS:  Patient seen and examined at bedside. No acute events overnight. Patient states she was able to sleep, no further episodes of nausea, vomiting or hematemesis, hasn't had any bowel movements yet since admission, reports improvement of her shortness of breath from before. Still feels at times that she is unable to catch her breath. Understands that she may need dialysis again today and that her femoral line in the groin will eventually need to be switched to a PC (likely on Monday as per vascular surgery).  Patient denies headache, abdominal pain, lower extremity edema or other symptoms. She states that she feels overwhelmed with her clinical course but states that she emotionally relies on the support of her children. Otherwise ROS unremarkable.      Vital Signs Last 24 Hrs  T(C): 37.1 (17 Mar 2018 05:41), Max: 37.1 (17 Mar 2018 05:41)  T(F): 98.7 (17 Mar 2018 05:41), Max: 98.7 (17 Mar 2018 05:41)  HR: 75 (17 Mar 2018 05:41) (72 - 81)  BP: 153/76 (17 Mar 2018 05:41) (130/68 - 158/84)  RR: 18 (17 Mar 2018 05:41) (18 - 18)  SpO2: 94% (17 Mar 2018 05:41) (90% - 95%)    Telemonitor: NSR 70s     PHYSICAL EXAM:  	Constitutional: NAD, laying in bed, pale-appearing  	HEENT: PERRLA, EOMI ; conjunctival pallor  	Neck: No JVD, supple  	Back: Normal spine flexure, No CVA tenderness  	Respiratory: Tachypneic, Bibasilar crackles b/l, no WRR   	Cardiovascular: S1 and S2, RRR, no m/r/g   	Gastrointestinal: BS+ normoactive, soft, ND, NTTP   	Extremities: +1 pitting edema b/l up to knees  	Vascular: 2+ peripheral pulses; cap refill >2 sec, pallor to extremities and nailbeds  	Neurological: AAO x 3, no focal deficits  	Psychiatric: Normal mood, normal affect  	Musculoskeletal: 5/5 strength b/l upper and lower extremities    Skin: No rashes, no diaphoresis   Left forearm PPD circled 0mm,    R femoral groin line in place  C/D/I                                    8.5    5.8   )-----------( 469      ( 17 Mar 2018 05:57 )             26.0     03-17    135  |  96<L>  |  26.0<H>  ----------------------------<  166<H>  4.2   |  24.0  |  3.78<H>    Ca    8.1<L>      17 Mar 2018 05:57      Phos  6.5     03-15      Imaging:   No new imaging studies

## 2018-03-17 NOTE — PROGRESS NOTE ADULT - PROBLEM SELECTOR PLAN 1
baseline ckd 3 now in ARF with pulm edema/ uremia sx/ uremic pericarditis requiring dialysis     -pt still clinically sob, tachypneic   -bun/cr noted and continues to be trended   - c/w strict I/O      -daily weight   -s/p dialysis yest and may require dialysis today depending on nephro evaluation    -hyponatremia is secondary to worsening renal function   -avoid nephrotoxic medications   -renal US noted with chronic medical renal disease no evidence of hydronephrosis   -nephro f/u noted and appreciated and patient likely to require long-term hemodialysis.  -Acute hepatitis panel negative, Hepb non immune    -ppd at 48 hours 0mm negative thus far    -pt will need a HD seat in the community   -vascular consult noted and appreciated for femoral access which was obtained yest; aware that the femoral line has to come out and PC needed. As per vascular will be placed on monday.

## 2018-03-17 NOTE — PROGRESS NOTE ADULT - SUBJECTIVE AND OBJECTIVE BOX
Pt seen and examined. She states that she is feeling better and has no c/o nausea or vomiting presently.     MEDICATIONS:  MEDICATIONS  (STANDING):  artificial  tears Solution 1 Drop(s) Both EYES every 12 hours  cloNIDine 0.1 milliGRAM(s) Oral two times a day  colchicine 0.6 milliGRAM(s) Oral two times a day  dextrose 5%. 1000 milliLiter(s) (50 mL/Hr) IV Continuous <Continuous>  dextrose 50% Injectable 12.5 Gram(s) IV Push once  dextrose 50% Injectable 25 Gram(s) IV Push once  dextrose 50% Injectable 25 Gram(s) IV Push once  diltiazem    Tablet 30 milliGRAM(s) Oral every 6 hours  docusate sodium 100 milliGRAM(s) Oral two times a day  hydrALAZINE 25 milliGRAM(s) Oral every 8 hours  insulin lispro (HumaLOG) corrective regimen sliding scale   SubCutaneous three times a day before meals  insulin lispro (HumaLOG) corrective regimen sliding scale   SubCutaneous at bedtime  iron sucrose IVPB 200 milliGRAM(s) IV Intermittent daily  pantoprazole    Tablet 40 milliGRAM(s) Oral two times a day  senna 2 Tablet(s) Oral once  simvastatin 40 milliGRAM(s) Oral at bedtime    MEDICATIONS  (PRN):  acetaminophen   Tablet 650 milliGRAM(s) Oral every 6 hours PRN For Temp greater than 38 C (100.4 F)  acetaminophen   Tablet. 650 milliGRAM(s) Oral every 6 hours PRN Moderate Pain (4 - 6)  dextrose Gel 1 Dose(s) Oral once PRN Blood Glucose LESS THAN 70 milliGRAM(s)/deciliter  glucagon  Injectable 1 milliGRAM(s) IntraMuscular once PRN Glucose LESS THAN 70 milligrams/deciliter  ondansetron Injectable 4 milliGRAM(s) IV Push every 6 hours PRN Nausea  polyethylene glycol 3350 17 Gram(s) Oral daily PRN Constipation      Allergies    No Known Allergies    Intolerances        Vital Signs Last 24 Hrs  T(C): 37.1 (17 Mar 2018 05:41), Max: 37.1 (17 Mar 2018 05:41)  T(F): 98.7 (17 Mar 2018 05:41), Max: 98.7 (17 Mar 2018 05:41)  HR: 75 (17 Mar 2018 05:41) (72 - 81)  BP: 153/76 (17 Mar 2018 05:41) (130/68 - 158/84)  BP(mean): --  RR: 18 (17 Mar 2018 05:41) (18 - 18)  SpO2: 94% (17 Mar 2018 05:41) (90% - 95%)    03-16 @ 07:01  -  03-17 @ 07:00  --------------------------------------------------------  IN: 0 mL / OUT: 1100 mL / NET: -1100 mL        PHYSICAL EXAM:    General: Well developed; well nourished; in no acute distress  HEENT: MMM, conjunctiva pink and sclera anicteric.  Lungs: clear to auscultation bilaterally.  Cor: RRR S1, S2 only.  Gastrointestinal: Abdomen: Soft non-tender non-distended; Normal bowel sounds; No hepatosplenomegaly  Extremities: no cyanosis, clubbing or edema.  Skin: Warm and dry. No obvious rash  Neuro: Pt. a + o x 3    LABS:      CBC Full  -  ( 17 Mar 2018 05:57 )  WBC Count : 5.8 K/uL  Hemoglobin : 8.5 g/dL  Hematocrit : 26.0 %  Platelet Count - Automated : 469 K/uL  Mean Cell Volume : 87.0 fl  Mean Cell Hemoglobin : 28.4 pg  Mean Cell Hemoglobin Concentration : 32.7 g/dL  Auto Neutrophil # : 4.1 K/uL  Auto Lymphocyte # : 0.7 K/uL  Auto Monocyte # : 0.7 K/uL  Auto Eosinophil # : 0.2 K/uL  Auto Basophil # : 0.0 K/uL  Auto Neutrophil % : 71.5 %  Auto Lymphocyte % : 12.5 %  Auto Monocyte % : 11.4 %  Auto Eosinophil % : 3.6 %  Auto Basophil % : 0.3 %    03-17    135  |  96<L>  |  26.0<H>  ----------------------------<  166<H>  4.2   |  24.0  |  3.78<H>    Ca    8.1<L>      17 Mar 2018 05:57                        RADIOLOGY & ADDITIONAL STUDIES (The following images were personally reviewed):

## 2018-03-17 NOTE — PROGRESS NOTE ADULT - PROBLEM SELECTOR PLAN 5
-bp stable, SBP peaks to the high 150's but patient has been receiving daily HD  -c/w clonidine and will up titrate as needed  -c/w hydralazine 25mg po q8hrs

## 2018-03-17 NOTE — PROGRESS NOTE ADULT - PROBLEM SELECTOR PLAN 3
likely multifactorial due to worsening acute on chronic renal insufficiency in the setting of CKD,  suspected GI bleed (initially with coffee ground emesis) but occult stool negative and degree of AOCD/ iron deficiency   -s/p 2 units pRBC on admission with improvement in h/h has remained stable   -last colonoscopy /EGD 2017 WNL as per patient  -occult stool neg  -will c/w monitoring h/h   -anemia panel noted with degree iron deficiency   -c/w ppi po bid   -c/w iron sucrose as per nephro and eventual change to ferrous sulfate   -pt could benefit from epogen   -GI f/u noted and appreciated and recommended conservative management at this time and leave on clear liquid diet when respiratory status improved will likely proceed with endoscopic intervention

## 2018-03-17 NOTE — PROGRESS NOTE ADULT - PROBLEM SELECTOR PLAN 1
Likely secondary to diabetic gastroparesis. Clinically improved. Diet advanced to low fiber, low fat, diabetic, renal diet and will observe for dietary tolerance. Repeat labs ordered for tomorrow AM. Likely secondary to diabetic gastroparesis. Clinically improved. Diet advanced to low fiber, low fat, diabetic, renal diet and will observe for dietary tolerance. IV Reglan 10 mg. IVP every 12 hrs. ( dose adjusted for renal failure) also ordered for empiric treatment of diabetic gastroparesis. Repeat labs ordered for tomorrow AM.

## 2018-03-17 NOTE — PROGRESS NOTE ADULT - ASSESSMENT
61 y/o female with PMH of Dm, renal insuff, chronic anemia who presents to hospital with sob, found to have worsening renal failure, positive tronps and chest discomfort  Pt gives hx of using nsaid out patient and developed black stools, abd pain on admit.  Echo with normal ef sclerotic aotic valve and Diastolic dysfunciton.  Started on dialysis for worsening renal function and developed afib which broke spontaneously    Positive trop/chest pain will need ischemic workup  pos tropm likely demand ischemia   Moderate pericarditis on colchicine observation  Atrial fib  maintaining nsr  no anticoag due to recent gi bleed  will need gi clearance  Ep consulted for evaluation of a watchman device  Gi bleed continue ppi workup per gi  endoscopy  Chf diastolic dysfuction improved with dialysis still with Pnd  Anemia transfuse prn  continue iv iron  Anemia - probably chronic or subacute .  GI workup.  CKD  Keep Hb > 9  Hypertension controlled on clonodine and hydralazine 59 y/o female with PMH of Dm, renal insuff, chronic anemia who presents to hospital with sob, found to have worsening renal failure, positive tronps and chest discomfort  Pt gives hx of using nsaid out patient and developed black stools, abd pain on admit.  Echo with normal ef sclerotic aotic valve and Diastolic dysfunciton.  Started on dialysis for worsening renal function and developed afib which broke spontaneously    Positive trop/chest pain will need ischemic workup  pos trops likely demand ischemia   Moderate pericarditis on colchicine observation  Atrial fib  maintaining nsr  no anticoag due to recent gi bleed  will need gi clearance  Ep consulted for evaluation of a watchman device  Gi bleed continue ppi workup per gi  endoscopy  Chf diastolic dysfuction improved with dialysis still with Pnd  Anemia transfuse prn  continue iv iron  Anemia - probably chronic or subacute .  GI workup.  CKD  Keep Hb > 9  Hypertension controlled on clonodine and hydralazine

## 2018-03-17 NOTE — PROGRESS NOTE ADULT - ASSESSMENT
MARSHALL on CKD 2/2 DM HTN   Pleural effusions/pericardial effusions/ pericarditis- now requiring HD  C3/c4 nL  Third HD treatment today  Anemia low iron stores TS 7%- 3/13- cont IV iron  Cards/ GI- f/u noted MARSHALL on CKD 2/2 DM HTN   Pleural effusions/pericardial effusions/ pericarditis-  HD x3  C3/c4 nL  Third HD treatment today  UOP? 24 hr urine ongoing  NO permcath for- now discussed w vascular  Anemia low iron stores TS 7%- 3/13- cont IV iron  Cards/ GI- f/u noted

## 2018-03-17 NOTE — PROGRESS NOTE ADULT - PROBLEM SELECTOR PLAN 6
HBA1C: 7.6   fs stable  -c/w accuchecks achs tid   -c/w HSS, patient rewquired 1 UI in 24 hrs  -c/w hypoglycemia protocol

## 2018-03-17 NOTE — PROGRESS NOTE ADULT - ATTENDING COMMENTS
Concern for underlying CAD. Plan for cardiac cath on monday. Discussed with patient. Will start aspirin 81 mg po.

## 2018-03-17 NOTE — PROGRESS NOTE ADULT - ASSESSMENT
57 y.o. F with hx of HTN, HLD, DMII, hyperkalemia, ckd 3 who presented with 3 weeks of postprandial vomiting and abd pain, noted to have ARF, fluid overload with bilateral pleural effusions and pericardial effusion as well as symptomatic anemia likely multifactorial secondary to acute on chronic renal failure/ KULWANT and suspected upper GI Bleeding from vomiting/retching and NSAID use. Clinical concern that worsening acute on chronic renal failure possibly caused uremia related sx of N/V, with patients NSAID use and retching suspected GI bleed but occult blood returned negative, for dropping h/h pt received 2 u prbc on admission with appropriate response to therapy. 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 In the interim while being dialyzed the patient had an episode of PAF, s/p cardizem became rate controlled and remained in NSR. EP consulted. Unable to AC the patient due to initial concern for GI bleeding, for which GI consulted and pt pending possible GI intervention when respiratory status improves. Clinical concern for ARF causing volume overload, unclear if HFPEF is a contributing factor. Patient will need eventual ischemia evaluation with cardiology with [facility] possibly monday.

## 2018-03-18 DIAGNOSIS — I10 ESSENTIAL (PRIMARY) HYPERTENSION: ICD-10-CM

## 2018-03-18 DIAGNOSIS — I50.30 UNSPECIFIED DIASTOLIC (CONGESTIVE) HEART FAILURE: ICD-10-CM

## 2018-03-18 DIAGNOSIS — I47.2 VENTRICULAR TACHYCARDIA: ICD-10-CM

## 2018-03-18 DIAGNOSIS — D64.9 ANEMIA, UNSPECIFIED: ICD-10-CM

## 2018-03-18 DIAGNOSIS — I31.8 OTHER SPECIFIED DISEASES OF PERICARDIUM: ICD-10-CM

## 2018-03-18 LAB
ANA PAT FLD IF-IMP: ABNORMAL
ANA TITR SER: ABNORMAL
ANION GAP SERPL CALC-SCNC: 16 MMOL/L — SIGNIFICANT CHANGE UP (ref 5–17)
BASOPHILS # BLD AUTO: 0 K/UL — SIGNIFICANT CHANGE UP (ref 0–0.2)
BASOPHILS NFR BLD AUTO: 0.3 % — SIGNIFICANT CHANGE UP (ref 0–2)
BUN SERPL-MCNC: 24 MG/DL — HIGH (ref 8–20)
CALCIUM SERPL-MCNC: 8.5 MG/DL — LOW (ref 8.6–10.2)
CHLORIDE SERPL-SCNC: 92 MMOL/L — LOW (ref 98–107)
CO2 SERPL-SCNC: 24 MMOL/L — SIGNIFICANT CHANGE UP (ref 22–29)
CREAT SERPL-MCNC: 3.5 MG/DL — HIGH (ref 0.5–1.3)
CULTURE RESULTS: SIGNIFICANT CHANGE UP
CULTURE RESULTS: SIGNIFICANT CHANGE UP
EOSINOPHIL # BLD AUTO: 0.1 K/UL — SIGNIFICANT CHANGE UP (ref 0–0.5)
EOSINOPHIL NFR BLD AUTO: 1.5 % — SIGNIFICANT CHANGE UP (ref 0–6)
GLUCOSE BLDC GLUCOMTR-MCNC: 157 MG/DL — HIGH (ref 70–99)
GLUCOSE BLDC GLUCOMTR-MCNC: 218 MG/DL — HIGH (ref 70–99)
GLUCOSE BLDC GLUCOMTR-MCNC: 220 MG/DL — HIGH (ref 70–99)
GLUCOSE BLDC GLUCOMTR-MCNC: 226 MG/DL — HIGH (ref 70–99)
GLUCOSE SERPL-MCNC: 165 MG/DL — HIGH (ref 70–115)
HBV SURFACE AG SER-ACNC: SIGNIFICANT CHANGE UP
HCT VFR BLD CALC: 31.4 % — LOW (ref 37–47)
HGB BLD-MCNC: 10.1 G/DL — LOW (ref 12–16)
LYMPHOCYTES # BLD AUTO: 0.7 K/UL — LOW (ref 1–4.8)
LYMPHOCYTES # BLD AUTO: 9.3 % — LOW (ref 20–55)
MAGNESIUM SERPL-MCNC: 2 MG/DL — SIGNIFICANT CHANGE UP (ref 1.6–2.6)
MCHC RBC-ENTMCNC: 28.2 PG — SIGNIFICANT CHANGE UP (ref 27–31)
MCHC RBC-ENTMCNC: 32.2 G/DL — SIGNIFICANT CHANGE UP (ref 32–36)
MCV RBC AUTO: 87.7 FL — SIGNIFICANT CHANGE UP (ref 81–99)
MONOCYTES # BLD AUTO: 0.7 K/UL — SIGNIFICANT CHANGE UP (ref 0–0.8)
MONOCYTES NFR BLD AUTO: 9 % — SIGNIFICANT CHANGE UP (ref 3–10)
NEUTROPHILS # BLD AUTO: 6.2 K/UL — SIGNIFICANT CHANGE UP (ref 1.8–8)
NEUTROPHILS NFR BLD AUTO: 79 % — HIGH (ref 37–73)
PHOSPHATE SERPL-MCNC: 3.9 MG/DL — SIGNIFICANT CHANGE UP (ref 2.4–4.7)
PLATELET # BLD AUTO: 502 K/UL — HIGH (ref 150–400)
POTASSIUM SERPL-MCNC: 3.9 MMOL/L — SIGNIFICANT CHANGE UP (ref 3.5–5.3)
POTASSIUM SERPL-SCNC: 3.9 MMOL/L — SIGNIFICANT CHANGE UP (ref 3.5–5.3)
RBC # BLD: 3.58 M/UL — LOW (ref 4.4–5.2)
RBC # FLD: 13.1 % — SIGNIFICANT CHANGE UP (ref 11–15.6)
SODIUM SERPL-SCNC: 132 MMOL/L — LOW (ref 135–145)
SPECIMEN SOURCE: SIGNIFICANT CHANGE UP
SPECIMEN SOURCE: SIGNIFICANT CHANGE UP
TROPONIN T SERPL-MCNC: 0.17 NG/ML — HIGH (ref 0–0.06)
WBC # BLD: 7.9 K/UL — SIGNIFICANT CHANGE UP (ref 4.8–10.8)
WBC # FLD AUTO: 7.9 K/UL — SIGNIFICANT CHANGE UP (ref 4.8–10.8)

## 2018-03-18 PROCEDURE — 99233 SBSQ HOSP IP/OBS HIGH 50: CPT | Mod: GC

## 2018-03-18 PROCEDURE — 93010 ELECTROCARDIOGRAM REPORT: CPT

## 2018-03-18 PROCEDURE — 99232 SBSQ HOSP IP/OBS MODERATE 35: CPT

## 2018-03-18 PROCEDURE — 99233 SBSQ HOSP IP/OBS HIGH 50: CPT

## 2018-03-18 RX ORDER — MAGNESIUM SULFATE 500 MG/ML
1 VIAL (ML) INJECTION ONCE
Qty: 0 | Refills: 0 | Status: COMPLETED | OUTPATIENT
Start: 2018-03-18 | End: 2018-03-18

## 2018-03-18 RX ORDER — AMIODARONE HYDROCHLORIDE 400 MG/1
0.5 TABLET ORAL
Qty: 900 | Refills: 0 | Status: DISCONTINUED | OUTPATIENT
Start: 2018-03-18 | End: 2018-03-20

## 2018-03-18 RX ORDER — MORPHINE SULFATE 50 MG/1
2 CAPSULE, EXTENDED RELEASE ORAL ONCE
Qty: 0 | Refills: 0 | Status: DISCONTINUED | OUTPATIENT
Start: 2018-03-18 | End: 2018-03-18

## 2018-03-18 RX ORDER — AMIODARONE HYDROCHLORIDE 400 MG/1
1 TABLET ORAL
Qty: 900 | Refills: 0 | Status: DISCONTINUED | OUTPATIENT
Start: 2018-03-18 | End: 2018-03-20

## 2018-03-18 RX ORDER — METOPROLOL TARTRATE 50 MG
25 TABLET ORAL EVERY 6 HOURS
Qty: 0 | Refills: 0 | Status: DISCONTINUED | OUTPATIENT
Start: 2018-03-18 | End: 2018-03-18

## 2018-03-18 RX ORDER — AMIODARONE HYDROCHLORIDE 400 MG/1
400 TABLET ORAL EVERY 12 HOURS
Qty: 0 | Refills: 0 | Status: DISCONTINUED | OUTPATIENT
Start: 2018-03-19 | End: 2018-03-20

## 2018-03-18 RX ORDER — METOPROLOL TARTRATE 50 MG
25 TABLET ORAL EVERY 12 HOURS
Qty: 0 | Refills: 0 | Status: DISCONTINUED | OUTPATIENT
Start: 2018-03-18 | End: 2018-03-20

## 2018-03-18 RX ORDER — AMIODARONE HYDROCHLORIDE 400 MG/1
150 TABLET ORAL ONCE
Qty: 0 | Refills: 0 | Status: COMPLETED | OUTPATIENT
Start: 2018-03-18 | End: 2018-03-18

## 2018-03-18 RX ADMIN — Medication 25 MILLIGRAM(S): at 12:36

## 2018-03-18 RX ADMIN — MORPHINE SULFATE 2 MILLIGRAM(S): 50 CAPSULE, EXTENDED RELEASE ORAL at 12:46

## 2018-03-18 RX ADMIN — Medication 0.1 MILLIGRAM(S): at 05:13

## 2018-03-18 RX ADMIN — PANTOPRAZOLE SODIUM 40 MILLIGRAM(S): 20 TABLET, DELAYED RELEASE ORAL at 05:13

## 2018-03-18 RX ADMIN — Medication 81 MILLIGRAM(S): at 12:36

## 2018-03-18 RX ADMIN — Medication 0.6 MILLIGRAM(S): at 17:36

## 2018-03-18 RX ADMIN — Medication 2: at 12:36

## 2018-03-18 RX ADMIN — AMIODARONE HYDROCHLORIDE 16.67 MG/MIN: 400 TABLET ORAL at 18:30

## 2018-03-18 RX ADMIN — PANTOPRAZOLE SODIUM 40 MILLIGRAM(S): 20 TABLET, DELAYED RELEASE ORAL at 17:36

## 2018-03-18 RX ADMIN — Medication 10 MILLIGRAM(S): at 17:36

## 2018-03-18 RX ADMIN — AMIODARONE HYDROCHLORIDE 33.33 MG/MIN: 400 TABLET ORAL at 12:36

## 2018-03-18 RX ADMIN — Medication 25 MILLIGRAM(S): at 22:02

## 2018-03-18 RX ADMIN — Medication 1 DROP(S): at 17:36

## 2018-03-18 RX ADMIN — Medication 0.1 MILLIGRAM(S): at 17:35

## 2018-03-18 RX ADMIN — Medication 100 MILLIGRAM(S): at 17:36

## 2018-03-18 RX ADMIN — Medication 1: at 08:57

## 2018-03-18 RX ADMIN — IRON SUCROSE 220 MILLIGRAM(S): 20 INJECTION, SOLUTION INTRAVENOUS at 15:15

## 2018-03-18 RX ADMIN — Medication 25 MILLIGRAM(S): at 05:14

## 2018-03-18 RX ADMIN — SIMVASTATIN 40 MILLIGRAM(S): 20 TABLET, FILM COATED ORAL at 22:02

## 2018-03-18 RX ADMIN — Medication 100 MILLIGRAM(S): at 05:15

## 2018-03-18 RX ADMIN — POLYETHYLENE GLYCOL 3350 17 GRAM(S): 17 POWDER, FOR SOLUTION ORAL at 12:36

## 2018-03-18 RX ADMIN — Medication 10 MILLIGRAM(S): at 05:18

## 2018-03-18 RX ADMIN — SENNA PLUS 2 TABLET(S): 8.6 TABLET ORAL at 22:02

## 2018-03-18 RX ADMIN — Medication 25 MILLIGRAM(S): at 15:15

## 2018-03-18 RX ADMIN — Medication 0.6 MILLIGRAM(S): at 05:13

## 2018-03-18 RX ADMIN — AMIODARONE HYDROCHLORIDE 618 MILLIGRAM(S): 400 TABLET ORAL at 11:56

## 2018-03-18 RX ADMIN — Medication 25 MILLIGRAM(S): at 17:36

## 2018-03-18 RX ADMIN — Medication 100 GRAM(S): at 10:21

## 2018-03-18 RX ADMIN — MORPHINE SULFATE 2 MILLIGRAM(S): 50 CAPSULE, EXTENDED RELEASE ORAL at 16:31

## 2018-03-18 RX ADMIN — Medication 1 DROP(S): at 05:14

## 2018-03-18 RX ADMIN — Medication 2: at 17:35

## 2018-03-18 NOTE — PROGRESS NOTE ADULT - ASSESSMENT
MARSHALL on CKD 2/2 DM HTN   Pleural effusions/pericardial effusions/ pericarditis-  HD x3 last HD yest 3/17  Will reassess kiara for HD needs  C3/c4 nL  UOP? 24 hr urine ongoing  NO permcath for- now discussed w vascular  Anemia low iron stores TS 7%- 3/13- cont IV iron  Cards/ GI- f/u noted

## 2018-03-18 NOTE — PROGRESS NOTE ADULT - ASSESSMENT
59 y/o female with PMH of Dm, renal insuff, chronic anemia who presents to hospital with sob, found to have worsening renal failure, positive tronps and chest discomfort  Pt gives hx of using nsaid out patient and developed black stools, abd pain on admit.  Echo with normal ef sclerotic aotic valve and Diastolic dysfunciton.  Started on dialysis for worsening renal function and developed afib which broke spontaneously now having nsvt    NSVT  dc cardizem start metorpolol  Mg one gm x q  aminodarone loading  Positive trop/chest pain  Cath planned for tomorrow  Moderate pericarditis on colchicine observation  Atrial fib  maintaining nsr  no anticoag due to recent gi bleed  will need gi clearance  Ep consulted for evaluation of a watchman device  Gi bleed continue ppi workup per gi  endoscopy  Chf diastolic dysfuction Dialysis yesterday  feels less sob  Anemia transfuse prn  continue iv iron  Anemia - probably chronic or subacute .  GI workup.  CKD  Keep Hb > 9  continue iv iron  Hypertension controlled on clonodine and hydralazine  beta blocker b/p is elevated today 61 y/o female with PMH of Dm, renal insuff, chronic anemia who presents to hospital with sob, found to have worsening renal failure, positive tronps and chest discomfort  Pt gives hx of using nsaid out patient and developed black stools, abd pain on admit.  Echo with normal ef sclerotic aotic valve and Diastolic dysfunciton.  Started on dialysis for worsening renal function and developed afib which broke spontaneously now having nsvt

## 2018-03-18 NOTE — PROGRESS NOTE ADULT - PROBLEM SELECTOR PLAN 5
-bp stable, SBP peaks to the high 150's but patient has been receiving daily HD  -c/w clonidine and will up titrate as needed  -c/w hydralazine 25mg po q8hrs -No further episodes of AFib  -no AC at this time until GI work up completed

## 2018-03-18 NOTE — PROGRESS NOTE ADULT - SUBJECTIVE AND OBJECTIVE BOX
Patient is a 57y old  Female who presents with a chief complaint of N/V and dark stools noted to be in acute pulm edema/pericardial effusion with ARF requiring HD (14 Mar 2018 02:54)    INTERVAL HPI/OVERNIGHT EVENTS:  Patient seen and examined at bedside. No acute events overnight. Patient states she is able to sleep, no further episodes of nausea, vomiting or hematemesis, reports improvement of her shortness of breath from before, had a bowel movement this morning it was dark in color, patient currently on IV iron. Understands that she may need dialysis again today and that her femoral line in the groin will eventually need to be switched to a PC (likely on Monday as per vascular surgery).  Patient denies headache, abdominal pain, lower extremity edema or other symptoms. She states that she feels overwhelmed with her clinical course but states that she emotionally relies on the support of her children. Otherwise ROS unremarkable.      Vital Signs Last 24 Hrs  T(C): 36.7 (18 Mar 2018 05:19), Max: 36.9 (17 Mar 2018 19:15)  T(F): 98 (18 Mar 2018 05:19), Max: 98.4 (17 Mar 2018 19:15)  HR: 82 (18 Mar 2018 05:19) (74 - 84)  BP: 180/96 (18 Mar 2018 05:19) (148/64 - 180/96)  BP(mean): --  RR: 16 (18 Mar 2018 05:19) (16 - 18)  SpO2: 96% (18 Mar 2018 05:19) (93% - 96%)    Telemonitor: Sinus rhythm    PHYSICAL EXAM:  	Constitutional: NAD, laying in bed, pale-appearing  	HEENT: PERRLA, EOMI ; conjunctival pallor  	Neck: No JVD, supple  	Back: Normal spine flexure, No CVA tenderness  	Respiratory: Tachypneic, decreased air entry, no wheezing, rales or rhonchi.   	Cardiovascular: S1 and S2, RRR, no m/r/g   	Gastrointestinal: BS+ normoactive, soft, ND, NTTP   	Extremities: +1 pitting edema b/l up to knees  	Vascular: 2+ peripheral pulses; cap refill >2 sec, pallor to extremities and nailbeds  	Neurological: AAO x 3, no focal deficits  	Psychiatric: Normal mood, normal affect  	Musculoskeletal: 5/5 strength b/l upper and lower extremities    Skin: No rashes, no diaphoresis   Left forearm PPD circled 0mm,    R femoral groin line in place  C/D/I     I&O's Detail    17 Mar 2018 07:01  -  18 Mar 2018 07:00  --------------------------------------------------------  IN:    Oral Fluid: 480 mL  Total IN: 480 mL    OUT:    Other: 1600 mL    Voided: 400 mL  Total OUT: 2000 mL    Total NET: -1520 mL                        10.1   7.9   )-----------( 502      ( 18 Mar 2018 05:50 )             31.4     03-18    132<L>  |  92<L>  |  24.0<H>  ----------------------------<  165<H>  3.9   |  24.0  |  3.50<H>    Ca    8.5<L>      18 Mar 2018 05:50    MEDICATIONS  (STANDING):  artificial  tears Solution 1 Drop(s) Both EYES every 12 hours  aspirin enteric coated 81 milliGRAM(s) Oral daily  cloNIDine 0.1 milliGRAM(s) Oral two times a day  colchicine 0.6 milliGRAM(s) Oral two times a day  dextrose 5%. 1000 milliLiter(s) (50 mL/Hr) IV Continuous <Continuous>  dextrose 50% Injectable 12.5 Gram(s) IV Push once  dextrose 50% Injectable 25 Gram(s) IV Push once  dextrose 50% Injectable 25 Gram(s) IV Push once  diltiazem    Tablet 30 milliGRAM(s) Oral every 6 hours  docusate sodium 100 milliGRAM(s) Oral two times a day  hydrALAZINE 25 milliGRAM(s) Oral every 8 hours  insulin lispro (HumaLOG) corrective regimen sliding scale   SubCutaneous three times a day before meals  insulin lispro (HumaLOG) corrective regimen sliding scale   SubCutaneous at bedtime  iron sucrose IVPB 200 milliGRAM(s) IV Intermittent daily  metoclopramide Injectable 10 milliGRAM(s) IV Push every 12 hours  pantoprazole    Tablet 40 milliGRAM(s) Oral two times a day  polyethylene glycol 3350 17 Gram(s) Oral daily  senna 2 Tablet(s) Oral at bedtime  simvastatin 40 milliGRAM(s) Oral at bedtime    MEDICATIONS  (PRN):  acetaminophen   Tablet 650 milliGRAM(s) Oral every 6 hours PRN For Temp greater than 38 C (100.4 F)  acetaminophen   Tablet. 650 milliGRAM(s) Oral every 6 hours PRN Moderate Pain (4 - 6)  dextrose Gel 1 Dose(s) Oral once PRN Blood Glucose LESS THAN 70 milliGRAM(s)/deciliter  glucagon  Injectable 1 milliGRAM(s) IntraMuscular once PRN Glucose LESS THAN 70 milligrams/deciliter  ondansetron Injectable 4 milliGRAM(s) IV Push every 6 hours PRN Nausea Patient is a 57y old  Female who presents with a chief complaint of N/V and dark stools noted to be in acute pulm edema/pericardial effusion with ARF requiring HD (14 Mar 2018 02:54)    INTERVAL HPI/OVERNIGHT EVENTS:  Patient seen and examined at bedside. No acute events overnight. Patient states she is able to sleep, no further episodes of nausea, vomiting or hematemesis, reports improvement of her shortness of breath from before, had a bowel movement this morning it was dark in color, patient currently on IV iron. Understands that she may need dialysis again today and that her femoral line in the groin will eventually need to be switched to a PC depending on her need for hemodialysis. Patient reports epigastric pain radiated to RUQ that initially improved after admission but that it became more intense last night. Denies headache, lower extremity edema. Otherwise ROS unremarkable.    Vital Signs Last 24 Hrs  T(C): 36.7 (18 Mar 2018 05:19), Max: 36.9 (17 Mar 2018 19:15)  T(F): 98 (18 Mar 2018 05:19), Max: 98.4 (17 Mar 2018 19:15)  HR: 82 (18 Mar 2018 05:19) (74 - 84)  BP: 180/96 (18 Mar 2018 05:19) (148/64 - 180/96)  BP(mean): --  RR: 16 (18 Mar 2018 05:19) (16 - 18)  SpO2: 96% (18 Mar 2018 05:19) (93% - 96%)    Telemonitor: Sinus rhythm, intermittently in and out of ventricular tachycardia, asymptomatic.    PHYSICAL EXAM:  	Constitutional: NAD, laying in bed, pale-appearing  	HEENT: PERRLA, EOMI ; conjunctival pallor  	Neck: No JVD, supple  	Back: Normal spine flexure, No CVA tenderness  	Respiratory: Tachypneic, decreased air entry, no wheezing, rales or rhonchi.   	Cardiovascular: S1 and S2, RRR, no m/r/g   	Gastrointestinal: BS+ normoactive, soft, ND, NTTP   	Extremities: +1 pitting edema b/l up to knees  	Vascular: 2+ peripheral pulses; cap refill >2 sec, pallor to extremities and nailbeds  	Neurological: AAO x 3, no focal deficits  	Psychiatric: Normal mood, normal affect  	Musculoskeletal: 5/5 strength b/l upper and lower extremities    Skin: No rashes, no diaphoresis   Left forearm PPD circled 0mm,    R femoral groin line in place  C/D/I     I&O's Detail    17 Mar 2018 07:01  -  18 Mar 2018 07:00  --------------------------------------------------------  IN:    Oral Fluid: 480 mL  Total IN: 480 mL    OUT:    Other: 1600 mL    Voided: 400 mL  Total OUT: 2000 mL    Total NET: -1520 mL                        10.1   7.9   )-----------( 502      ( 18 Mar 2018 05:50 )             31.4     03-18    132<L>  |  92<L>  |  24.0<H>  ----------------------------<  165<H>  3.9   |  24.0  |  3.50<H>    Ca    8.5<L>      18 Mar 2018 05:50    MEDICATIONS  (STANDING):  artificial  tears Solution 1 Drop(s) Both EYES every 12 hours  aspirin enteric coated 81 milliGRAM(s) Oral daily  cloNIDine 0.1 milliGRAM(s) Oral two times a day  colchicine 0.6 milliGRAM(s) Oral two times a day  dextrose 5%. 1000 milliLiter(s) (50 mL/Hr) IV Continuous <Continuous>  dextrose 50% Injectable 12.5 Gram(s) IV Push once  dextrose 50% Injectable 25 Gram(s) IV Push once  dextrose 50% Injectable 25 Gram(s) IV Push once  diltiazem    Tablet 30 milliGRAM(s) Oral every 6 hours  docusate sodium 100 milliGRAM(s) Oral two times a day  hydrALAZINE 25 milliGRAM(s) Oral every 8 hours  insulin lispro (HumaLOG) corrective regimen sliding scale   SubCutaneous three times a day before meals  insulin lispro (HumaLOG) corrective regimen sliding scale   SubCutaneous at bedtime  iron sucrose IVPB 200 milliGRAM(s) IV Intermittent daily  metoclopramide Injectable 10 milliGRAM(s) IV Push every 12 hours  pantoprazole    Tablet 40 milliGRAM(s) Oral two times a day  polyethylene glycol 3350 17 Gram(s) Oral daily  senna 2 Tablet(s) Oral at bedtime  simvastatin 40 milliGRAM(s) Oral at bedtime    MEDICATIONS  (PRN):  acetaminophen   Tablet 650 milliGRAM(s) Oral every 6 hours PRN For Temp greater than 38 C (100.4 F)  acetaminophen   Tablet. 650 milliGRAM(s) Oral every 6 hours PRN Moderate Pain (4 - 6)  dextrose Gel 1 Dose(s) Oral once PRN Blood Glucose LESS THAN 70 milliGRAM(s)/deciliter  glucagon  Injectable 1 milliGRAM(s) IntraMuscular once PRN Glucose LESS THAN 70 milligrams/deciliter  ondansetron Injectable 4 milliGRAM(s) IV Push every 6 hours PRN Nausea

## 2018-03-18 NOTE — PROGRESS NOTE ADULT - PROBLEM SELECTOR PLAN 4
- Rate controlled and currently in sinus rhythm   -c/w cardizem 30mg po q6hrs  -no AC at this time until GI work up completed - Will discontinue Diltiazem and will start Amiodarone drip with bridging to PO and metoprolol as per Cardiology recommendation.

## 2018-03-18 NOTE — PROGRESS NOTE ADULT - SUBJECTIVE AND OBJECTIVE BOX
NEPHROLOGY INTERVAL HPI/OVERNIGHT EVENTS:    Examined earlier  HD yest    MEDICATIONS  (STANDING):  amiodarone Infusion 1 mG/Min (33.333 mL/Hr) IV Continuous <Continuous>  amiodarone Infusion 0.5 mG/Min (16.667 mL/Hr) IV Continuous <Continuous>  amiodarone IVPB 150 milliGRAM(s) IV Intermittent once  artificial  tears Solution 1 Drop(s) Both EYES every 12 hours  aspirin enteric coated 81 milliGRAM(s) Oral daily  cloNIDine 0.1 milliGRAM(s) Oral two times a day  colchicine 0.6 milliGRAM(s) Oral two times a day  dextrose 5%. 1000 milliLiter(s) (50 mL/Hr) IV Continuous <Continuous>  dextrose 50% Injectable 12.5 Gram(s) IV Push once  dextrose 50% Injectable 25 Gram(s) IV Push once  dextrose 50% Injectable 25 Gram(s) IV Push once  docusate sodium 100 milliGRAM(s) Oral two times a day  hydrALAZINE 25 milliGRAM(s) Oral every 8 hours  insulin lispro (HumaLOG) corrective regimen sliding scale   SubCutaneous three times a day before meals  insulin lispro (HumaLOG) corrective regimen sliding scale   SubCutaneous at bedtime  iron sucrose IVPB 200 milliGRAM(s) IV Intermittent daily  metoclopramide Injectable 10 milliGRAM(s) IV Push every 12 hours  metoprolol     tartrate 25 milliGRAM(s) Oral every 6 hours  pantoprazole    Tablet 40 milliGRAM(s) Oral two times a day  polyethylene glycol 3350 17 Gram(s) Oral daily  senna 2 Tablet(s) Oral at bedtime  simvastatin 40 milliGRAM(s) Oral at bedtime    MEDICATIONS  (PRN):  acetaminophen   Tablet 650 milliGRAM(s) Oral every 6 hours PRN For Temp greater than 38 C (100.4 F)  acetaminophen   Tablet. 650 milliGRAM(s) Oral every 6 hours PRN Moderate Pain (4 - 6)  dextrose Gel 1 Dose(s) Oral once PRN Blood Glucose LESS THAN 70 milliGRAM(s)/deciliter  glucagon  Injectable 1 milliGRAM(s) IntraMuscular once PRN Glucose LESS THAN 70 milligrams/deciliter  ondansetron Injectable 4 milliGRAM(s) IV Push every 6 hours PRN Nausea      Allergies    No Known Allergies    Intolerances        Vital Signs Last 24 Hrs  T(C): 36.8 (18 Mar 2018 11:08), Max: 36.9 (17 Mar 2018 19:15)  T(F): 98.3 (18 Mar 2018 11:08), Max: 98.4 (17 Mar 2018 19:15)  HR: 84 (18 Mar 2018 11:08) (74 - 84)  BP: 163/99 (18 Mar 2018 11:08) (160/84 - 180/96)  BP(mean): --  RR: 16 (18 Mar 2018 11:08) (16 - 18)  SpO2: 96% (18 Mar 2018 11:08) (95% - 96%)  Daily     Daily Weight in k.5 (18 Mar 2018 06:40)    PHYSICAL EXAM:  GENERAL: appears chronically ill  HEAD: NCAT  EYES: EOMI  NECK: supple no JVD  NERVOUS SYSTEM:  A&O x 3 primarily English speaking  CHEST/LUNG: decreased bs bases  HEART: + rub +S1S2  ABDOMEN: soft  EXTREMITIES: trace LE edema      LABS:                        10.1   7.9   )-----------( 502      ( 18 Mar 2018 05:50 )             31.4         132<L>  |  92<L>  |  24.0<H>  ----------------------------<  165<H>  3.9   |  24.0  |  3.50<H>    Ca    8.5<L>      18 Mar 2018 05:50  Mg     2.0               Magnesium, Serum: 2.0 mg/dL ( @ 05:50)          RADIOLOGY & ADDITIONAL TESTS:

## 2018-03-18 NOTE — PROGRESS NOTE ADULT - PROBLEM SELECTOR PLAN 7
No chemical ac given severe anemia   SCDs while in bed  Ambulate with assistance HBA1C: 7.6   fs stable  -c/w accuchecks achs tid   -c/w HSS, patient rewquired 1 UI in 24 hrs  -c/w hypoglycemia protocol

## 2018-03-18 NOTE — PROGRESS NOTE ADULT - SUBJECTIVE AND OBJECTIVE BOX
[place] [facility] [place] [facility]                                                        Office: [address] [address]                                                       Telephone: [phone]. Fax:[phone]                                                                             PROGRESS NOTE   Reason for follow up:  CHF, v tach     Update: Having multiple episodes of nsvt 150  breaks spontaneously    Subjective:   [place] via intepreter    Review of symptoms:  Feels tired but ok  Cardiac:  No chest pain. No dyspnea. Denies any palpitations during episodes  Respiratory:no cough. No dyspnea  Gastrointestinal: No diarrhea. No abdominal pain. No bleeding.       PMH  Chronic renal failure  Chronic anemia  Aodm  Essential hypertension  Gout  Gerd  Atrial fib 3/16    	  Vitals:  T(C): 36.7 (03-18-18 @ 05:19), Max: 36.9 (03-17-18 @ 19:15)  HR: 82 (03-18-18 @ 05:19) (74 - 84)  BP: 180/96 (03-18-18 @ 05:19) (148/64 - 180/96)  RR: 16 (03-18-18 @ 05:19) (16 - 18)  SpO2: 96% (03-18-18 @ 05:19) (93% - 96%)  Wt(kg): --  I&O's Summary    17 Mar 2018 07:01  -  18 Mar 2018 07:00  --------------------------------------------------------  IN: 480 mL / OUT: 2000 mL / NET: -1520 mL      Weight (kg): 68 (03-13 @ 19:13)    PHYSICAL EXAM:  Appearance: Comfortable. No acute distress  HEENT:  Atraumatic  Lymphatic: No cervical lymphadenopathy  Cardiovascular: Normal S1 S2,   Respiratory: Lungs clear to auscultation  Gastrointestinal:  Soft, Non-tender, + BS  Lower Extremities: No edema  Psychiatry: Patient is calm. No agitation. Mood & affect appropriate  Neurologic: A & O x 3, no focal deficits. EOMI , Cranial nerves are intact.      CURRENT MEDICATIONS:  amiodarone Infusion 1 mG/Min IV Continuous <Continuous>  amiodarone Infusion 0.5 mG/Min IV Continuous <Continuous>  amiodarone IVPB 150 milliGRAM(s) IV Intermittent once  cloNIDine 0.1 milliGRAM(s) Oral two times a day  diltiazem    Tablet 30 milliGRAM(s) Oral every 6 hours  hydrALAZINE 25 milliGRAM(s) Oral every 8 hours    metoclopramide Injectable  docusate sodium  pantoprazole    Tablet  polyethylene glycol 3350  senna  colchicine  dextrose 50% Injectable  dextrose 50% Injectable  dextrose 50% Injectable  insulin lispro (HumaLOG) corrective regimen sliding scale  insulin lispro (HumaLOG) corrective regimen sliding scale  simvastatin  artificial  tears Solution  aspirin enteric coated  dextrose 5%.  iron sucrose IVPB  magnesium sulfate  IVPB      LABS:	 	                              10.1   7.9   )-----------( 502      ( 18 Mar 2018 05:50 )             31.4     03-18    132<L>  |  92<L>  |  24.0<H>  ----------------------------<  165<H>  3.9   |  24.0  |  3.50<H>    Ca    8.5<L>      18 Mar 2018 05:50  Mg     2.0     03-18      proBNP: Serum Pro-Brain Natriuretic Peptide: 00058 pg/mL (03-14 @ 11:12)    Lipid Profile: Date: 03-14 @ 11:12  Total cholesterol 214; Direct LDL: 128; HDL: 63; Triglycerides:114    HgA1c: Hemoglobin A1C, Whole Blood: 7.5 %  TSH: Thyroid Stimulating Hormone, Serum: 6.95 uIU/mL      TELEMETRY: Nsr with multiple episodes of nsvt  Ekg pending    DIAGNOSTIC TESTING:  [ x] Echocardiogram: DIAGNOSTIC TESTING:  Echocardiogram (images reviewed by me): 3/14/18  Summary:   1. Left ventricular ejection fraction, by visual estimation, is 60 to   65%.   2. Normal global left ventricular systolic function.   3. Spectral Doppler shows impaired relaxation pattern of left   ventricular myocardial filling (Grade I diastolic dysfunction).   4. Normal right ventricular size and function.   5. Moderate pericardial effusion.   6. Moderate pleural effusion in both left and right lateral regions.   7. Mild mitral valve regurgitation.   8. Sclerotic aortic valve with normal opening.   9. Questionable llambl's visualized on the aortic valve.  10. Mildly dilated pulmonary artery.      OTHER:

## 2018-03-18 NOTE — PROGRESS NOTE ADULT - ASSESSMENT
57 y.o. F with hx of HTN, HLD, DMII, hyperkalemia, ckd 3 who presented with 3 weeks of postprandial vomiting and abd pain, noted to have ARF, fluid overload with bilateral pleural effusions and pericardial effusion as well as symptomatic anemia likely multifactorial secondary to acute on chronic renal failure/ KULWANT and suspected upper GI Bleeding from vomiting/retching and NSAID use. Clinical concern that worsening acute on chronic renal failure possibly caused uremia related sx of N/V, with patients NSAID use and retching suspected GI bleed but occult blood returned negative, for dropping h/h pt received 2 u prbc on admission with appropriate response to therapy. 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 In the interim while being dialyzed the patient had an episode of PAF, s/p cardizem became rate controlled and remained in NSR. EP consulted. Unable to AC the patient due to initial concern for GI bleeding, for which GI consulted and pt pending possible GI intervention when respiratory status improves. Clinical concern for ARF causing volume overload, unclear if HFPEF is a contributing factor. Patient will need eventual ischemia evaluation with cardiology with LHC possibly cath tomorrow.

## 2018-03-18 NOTE — PROGRESS NOTE ADULT - ATTENDING COMMENTS
I have seen and examined the patient, reviewed the chart, labs and diagnostics and I agree with assessment and plan as above with resident team. note edited as above where required  Non sustained V TACH on tele, Pt asymptomatic   starting Amiodarone drip and stop Cardizem  elevated troponin; Planning for cath in am by cardiology team  repeat BMP in am. Nephrology team  recommended hold off on PERMA CATH until they see renal functions in am and whether she needs HD regularly or not

## 2018-03-18 NOTE — PROGRESS NOTE ADULT - ATTENDING COMMENTS
Recurrent VT. Started on amiodarone infusion.   Hypertensive- Since having to ad beta blocker and amiodarone - will decrease blood pressure. based on cardiac cath tomorrow, may consider adding amlodipine for BP control.

## 2018-03-18 NOTE — PROGRESS NOTE ADULT - PROBLEM SELECTOR PLAN 2
Likely secondary to CKD. Repeat labs ordered for the AM. This AM's Hb is up to 10 grams after transfusion.

## 2018-03-18 NOTE — PROGRESS NOTE ADULT - PROBLEM SELECTOR PLAN 6
HBA1C: 7.6   fs stable  -c/w accuchecks achs tid   -c/w HSS, patient rewquired 1 UI in 24 hrs  -c/w hypoglycemia protocol -bp stable, SBP peaks to the high 150's but patient has been receiving daily HD  -c/w clonidine and will up titrate as needed  -c/w hydralazine 25mg po q8hrs

## 2018-03-18 NOTE — PROGRESS NOTE ADULT - SUBJECTIVE AND OBJECTIVE BOX
Pt seen and examined. She appeared to tolerate yesterday's diet advancement to low residue, low fat with IVP Reglan 10 mg. every 12 hours.w/o recurrent nausea or vomiting.     MEDICATIONS:  MEDICATIONS  (STANDING):  artificial  tears Solution 1 Drop(s) Both EYES every 12 hours  aspirin enteric coated 81 milliGRAM(s) Oral daily  cloNIDine 0.1 milliGRAM(s) Oral two times a day  colchicine 0.6 milliGRAM(s) Oral two times a day  dextrose 5%. 1000 milliLiter(s) (50 mL/Hr) IV Continuous <Continuous>  dextrose 50% Injectable 12.5 Gram(s) IV Push once  dextrose 50% Injectable 25 Gram(s) IV Push once  dextrose 50% Injectable 25 Gram(s) IV Push once  diltiazem    Tablet 30 milliGRAM(s) Oral every 6 hours  docusate sodium 100 milliGRAM(s) Oral two times a day  hydrALAZINE 25 milliGRAM(s) Oral every 8 hours  insulin lispro (HumaLOG) corrective regimen sliding scale   SubCutaneous three times a day before meals  insulin lispro (HumaLOG) corrective regimen sliding scale   SubCutaneous at bedtime  iron sucrose IVPB 200 milliGRAM(s) IV Intermittent daily  magnesium sulfate  IVPB 1 Gram(s) IV Intermittent once  metoclopramide Injectable 10 milliGRAM(s) IV Push every 12 hours  pantoprazole    Tablet 40 milliGRAM(s) Oral two times a day  polyethylene glycol 3350 17 Gram(s) Oral daily  senna 2 Tablet(s) Oral at bedtime  simvastatin 40 milliGRAM(s) Oral at bedtime    MEDICATIONS  (PRN):  acetaminophen   Tablet 650 milliGRAM(s) Oral every 6 hours PRN For Temp greater than 38 C (100.4 F)  acetaminophen   Tablet. 650 milliGRAM(s) Oral every 6 hours PRN Moderate Pain (4 - 6)  dextrose Gel 1 Dose(s) Oral once PRN Blood Glucose LESS THAN 70 milliGRAM(s)/deciliter  glucagon  Injectable 1 milliGRAM(s) IntraMuscular once PRN Glucose LESS THAN 70 milligrams/deciliter  ondansetron Injectable 4 milliGRAM(s) IV Push every 6 hours PRN Nausea      Allergies    No Known Allergies    Intolerances        Vital Signs Last 24 Hrs  T(C): 36.7 (18 Mar 2018 05:19), Max: 36.9 (17 Mar 2018 19:15)  T(F): 98 (18 Mar 2018 05:19), Max: 98.4 (17 Mar 2018 19:15)  HR: 82 (18 Mar 2018 05:19) (74 - 84)  BP: 180/96 (18 Mar 2018 05:19) (148/64 - 180/96)  BP(mean): --  RR: 16 (18 Mar 2018 05:19) (16 - 18)  SpO2: 96% (18 Mar 2018 05:19) (93% - 96%)    03-17 @ 07:01  -  03-18 @ 07:00  --------------------------------------------------------  IN: 480 mL / OUT: 2000 mL / NET: -1520 mL        PHYSICAL EXAM:    General: Well developed; well nourished; in no acute distress  HEENT: MMM, conjunctiva pink and sclera anicteric.  Lungs: Decreased breath sounds bilaterally.  Cor: RRR S1, S2 only.  Gastrointestinal: Abdomen: Soft non-tender non-distended; Normal bowel sounds; No hepatosplenomegaly  Extremities: no cyanosis, clubbing or edema.  Skin: Warm and dry. No obvious rash  Neuro: Pt. a + o x 3.    LABS:      CBC Full  -  ( 18 Mar 2018 05:50 )  WBC Count : 7.9 K/uL  Hemoglobin : 10.1 g/dL  Hematocrit : 31.4 %  Platelet Count - Automated : 502 K/uL  Mean Cell Volume : 87.7 fl  Mean Cell Hemoglobin : 28.2 pg  Mean Cell Hemoglobin Concentration : 32.2 g/dL  Auto Neutrophil # : 6.2 K/uL  Auto Lymphocyte # : 0.7 K/uL  Auto Monocyte # : 0.7 K/uL  Auto Eosinophil # : 0.1 K/uL  Auto Basophil # : 0.0 K/uL  Auto Neutrophil % : 79.0 %  Auto Lymphocyte % : 9.3 %  Auto Monocyte % : 9.0 %  Auto Eosinophil % : 1.5 %  Auto Basophil % : 0.3 %    03-18    132<L>  |  92<L>  |  24.0<H>  ----------------------------<  165<H>  3.9   |  24.0  |  3.50<H>    Ca    8.5<L>      18 Mar 2018 05:50  Mg     2.0     03-18                        RADIOLOGY & ADDITIONAL STUDIES (The following images were personally reviewed):

## 2018-03-19 DIAGNOSIS — I31.3 PERICARDIAL EFFUSION (NONINFLAMMATORY): ICD-10-CM

## 2018-03-19 DIAGNOSIS — J90 PLEURAL EFFUSION, NOT ELSEWHERE CLASSIFIED: ICD-10-CM

## 2018-03-19 LAB
ANION GAP SERPL CALC-SCNC: 16 MMOL/L — SIGNIFICANT CHANGE UP (ref 5–17)
APTT BLD: 30.2 SEC — SIGNIFICANT CHANGE UP (ref 27.5–37.4)
AUTO DIFF PNL BLD: NEGATIVE — SIGNIFICANT CHANGE UP
BASOPHILS # BLD AUTO: 0 K/UL — SIGNIFICANT CHANGE UP (ref 0–0.2)
BASOPHILS NFR BLD AUTO: 0.2 % — SIGNIFICANT CHANGE UP (ref 0–2)
BUN SERPL-MCNC: 30 MG/DL — HIGH (ref 8–20)
C-ANCA SER-ACNC: NEGATIVE — SIGNIFICANT CHANGE UP
CALCIUM SERPL-MCNC: 8.5 MG/DL — LOW (ref 8.6–10.2)
CHLORIDE SERPL-SCNC: 86 MMOL/L — LOW (ref 98–107)
CO2 SERPL-SCNC: 24 MMOL/L — SIGNIFICANT CHANGE UP (ref 22–29)
COLLECT DURATION TIME UR: 24 HR — SIGNIFICANT CHANGE UP
CREAT SERPL-MCNC: 4.53 MG/DL — HIGH (ref 0.5–1.3)
CULTURE RESULTS: SIGNIFICANT CHANGE UP
EOSINOPHIL # BLD AUTO: 0.2 K/UL — SIGNIFICANT CHANGE UP (ref 0–0.5)
EOSINOPHIL NFR BLD AUTO: 2.4 % — SIGNIFICANT CHANGE UP (ref 0–6)
GLUCOSE BLDC GLUCOMTR-MCNC: 178 MG/DL — HIGH (ref 70–99)
GLUCOSE BLDC GLUCOMTR-MCNC: 179 MG/DL — HIGH (ref 70–99)
GLUCOSE BLDC GLUCOMTR-MCNC: 180 MG/DL — HIGH (ref 70–99)
GLUCOSE BLDC GLUCOMTR-MCNC: 187 MG/DL — HIGH (ref 70–99)
GLUCOSE SERPL-MCNC: 193 MG/DL — HIGH (ref 70–115)
HCT VFR BLD CALC: 28.4 % — LOW (ref 37–47)
HGB BLD-MCNC: 9.4 G/DL — LOW (ref 12–16)
INR BLD: 1.21 RATIO — HIGH (ref 0.88–1.16)
LYMPHOCYTES # BLD AUTO: 0.8 K/UL — LOW (ref 1–4.8)
LYMPHOCYTES # BLD AUTO: 8.3 % — LOW (ref 20–55)
MAGNESIUM SERPL-MCNC: 2 MG/DL — SIGNIFICANT CHANGE UP (ref 1.6–2.6)
MCHC RBC-ENTMCNC: 28.5 PG — SIGNIFICANT CHANGE UP (ref 27–31)
MCHC RBC-ENTMCNC: 33.1 G/DL — SIGNIFICANT CHANGE UP (ref 32–36)
MCV RBC AUTO: 86.1 FL — SIGNIFICANT CHANGE UP (ref 81–99)
MONOCYTES # BLD AUTO: 1.1 K/UL — HIGH (ref 0–0.8)
MONOCYTES NFR BLD AUTO: 11.6 % — HIGH (ref 3–10)
NEUTROPHILS # BLD AUTO: 7.5 K/UL — SIGNIFICANT CHANGE UP (ref 1.8–8)
NEUTROPHILS NFR BLD AUTO: 76.8 % — HIGH (ref 37–73)
O2 CT VFR BLD CALC: ABNORMAL
P-ANCA SER-ACNC: POSITIVE
PHOSPHATE SERPL-MCNC: 4.2 MG/DL — SIGNIFICANT CHANGE UP (ref 2.4–4.7)
PLATELET # BLD AUTO: 489 K/UL — HIGH (ref 150–400)
POTASSIUM SERPL-MCNC: 3.8 MMOL/L — SIGNIFICANT CHANGE UP (ref 3.5–5.3)
POTASSIUM SERPL-SCNC: 3.8 MMOL/L — SIGNIFICANT CHANGE UP (ref 3.5–5.3)
PROT 24H UR-MRATE: >1000 MG/24HR — HIGH (ref 50–100)
PROTHROM AB SERPL-ACNC: 13.4 SEC — HIGH (ref 9.8–12.7)
RBC # BLD: 3.3 M/UL — LOW (ref 4.4–5.2)
RBC # FLD: 13.4 % — SIGNIFICANT CHANGE UP (ref 11–15.6)
SODIUM SERPL-SCNC: 126 MMOL/L — LOW (ref 135–145)
SPECIMEN SOURCE: SIGNIFICANT CHANGE UP
TOTAL VOLUME - 24 HOUR: 250 ML — SIGNIFICANT CHANGE UP
URINE CREATININE CALCULATION: 0.2 G/24 HR — LOW (ref 0.8–1.8)
WBC # BLD: 9.8 K/UL — SIGNIFICANT CHANGE UP (ref 4.8–10.8)
WBC # FLD AUTO: 9.8 K/UL — SIGNIFICANT CHANGE UP (ref 4.8–10.8)

## 2018-03-19 PROCEDURE — 93010 ELECTROCARDIOGRAM REPORT: CPT

## 2018-03-19 PROCEDURE — 99233 SBSQ HOSP IP/OBS HIGH 50: CPT

## 2018-03-19 PROCEDURE — 71045 X-RAY EXAM CHEST 1 VIEW: CPT | Mod: 26

## 2018-03-19 PROCEDURE — 99233 SBSQ HOSP IP/OBS HIGH 50: CPT | Mod: GC

## 2018-03-19 RX ORDER — HYDRALAZINE HCL 50 MG
20 TABLET ORAL EVERY 6 HOURS
Qty: 0 | Refills: 0 | Status: DISCONTINUED | OUTPATIENT
Start: 2018-03-19 | End: 2018-03-28

## 2018-03-19 RX ORDER — HYDRALAZINE HCL 50 MG
20 TABLET ORAL EVERY 6 HOURS
Qty: 0 | Refills: 0 | Status: DISCONTINUED | OUTPATIENT
Start: 2018-03-19 | End: 2018-03-19

## 2018-03-19 RX ORDER — TUBERCULIN PURIFIED PROTEIN DERIVATIVE 5 [IU]/.1ML
5 INJECTION, SOLUTION INTRADERMAL ONCE
Qty: 0 | Refills: 0 | Status: COMPLETED | OUTPATIENT
Start: 2018-03-19 | End: 2018-03-26

## 2018-03-19 RX ADMIN — Medication 25 MILLIGRAM(S): at 22:20

## 2018-03-19 RX ADMIN — Medication 0.1 MILLIGRAM(S): at 18:45

## 2018-03-19 RX ADMIN — Medication 25 MILLIGRAM(S): at 05:58

## 2018-03-19 RX ADMIN — Medication 20 MILLIGRAM(S): at 20:52

## 2018-03-19 RX ADMIN — SIMVASTATIN 40 MILLIGRAM(S): 20 TABLET, FILM COATED ORAL at 21:24

## 2018-03-19 RX ADMIN — ONDANSETRON 4 MILLIGRAM(S): 8 TABLET, FILM COATED ORAL at 18:46

## 2018-03-19 RX ADMIN — Medication 25 MILLIGRAM(S): at 18:45

## 2018-03-19 RX ADMIN — Medication 1: at 18:47

## 2018-03-19 RX ADMIN — Medication 20 MILLIGRAM(S): at 11:58

## 2018-03-19 RX ADMIN — Medication 1: at 15:04

## 2018-03-19 RX ADMIN — Medication 0.6 MILLIGRAM(S): at 05:58

## 2018-03-19 RX ADMIN — Medication 30 MILLILITER(S): at 02:32

## 2018-03-19 RX ADMIN — Medication 100 MILLIGRAM(S): at 18:46

## 2018-03-19 RX ADMIN — POLYETHYLENE GLYCOL 3350 17 GRAM(S): 17 POWDER, FOR SOLUTION ORAL at 11:58

## 2018-03-19 RX ADMIN — Medication 25 MILLIGRAM(S): at 15:04

## 2018-03-19 RX ADMIN — Medication 0.1 MILLIGRAM(S): at 05:58

## 2018-03-19 RX ADMIN — Medication 10 MILLIGRAM(S): at 18:49

## 2018-03-19 RX ADMIN — PANTOPRAZOLE SODIUM 40 MILLIGRAM(S): 20 TABLET, DELAYED RELEASE ORAL at 05:57

## 2018-03-19 RX ADMIN — Medication 81 MILLIGRAM(S): at 09:50

## 2018-03-19 RX ADMIN — Medication 0.6 MILLIGRAM(S): at 18:45

## 2018-03-19 RX ADMIN — Medication 1 DROP(S): at 18:46

## 2018-03-19 RX ADMIN — AMIODARONE HYDROCHLORIDE 400 MILLIGRAM(S): 400 TABLET ORAL at 05:57

## 2018-03-19 RX ADMIN — Medication 1 DROP(S): at 05:58

## 2018-03-19 RX ADMIN — Medication 10 MILLIGRAM(S): at 05:58

## 2018-03-19 RX ADMIN — SENNA PLUS 2 TABLET(S): 8.6 TABLET ORAL at 21:24

## 2018-03-19 RX ADMIN — PANTOPRAZOLE SODIUM 40 MILLIGRAM(S): 20 TABLET, DELAYED RELEASE ORAL at 18:45

## 2018-03-19 RX ADMIN — AMIODARONE HYDROCHLORIDE 400 MILLIGRAM(S): 400 TABLET ORAL at 18:45

## 2018-03-19 RX ADMIN — Medication 100 MILLIGRAM(S): at 05:58

## 2018-03-19 NOTE — DIETITIAN INITIAL EVALUATION ADULT. - PROBLEM SELECTOR PLAN 6
Troponemia likely in the setting of CKD and demand ischemia due to anemia  -troponin 0.16 at 2100  -will f/up repeat troponin at 3am as above

## 2018-03-19 NOTE — DIETITIAN INITIAL EVALUATION ADULT. - PROBLEM SELECTOR PLAN 3
Postprandial nausea/vomiting with scant hematemesis  -Advance diet as tolerated  -IV fluids for dehydration  -zofran Q6 PRN nausea/vomiting  -c/t monitor vitals

## 2018-03-19 NOTE — PROGRESS NOTE ADULT - SUBJECTIVE AND OBJECTIVE BOX
NEPHROLOGY INTERVAL HPI/OVERNIGHT EVENTS:    Examined earlier  Poor UOP on 24 hr urine 250cc  dyspnea w minimal exertion    MEDICATIONS  (STANDING):  amiodarone    Tablet 400 milliGRAM(s) Oral every 12 hours  amiodarone Infusion 1 mG/Min (33.333 mL/Hr) IV Continuous <Continuous>  amiodarone Infusion 0.5 mG/Min (16.667 mL/Hr) IV Continuous <Continuous>  artificial  tears Solution 1 Drop(s) Both EYES every 12 hours  aspirin enteric coated 81 milliGRAM(s) Oral daily  cloNIDine 0.1 milliGRAM(s) Oral two times a day  colchicine 0.6 milliGRAM(s) Oral two times a day  dextrose 5%. 1000 milliLiter(s) (50 mL/Hr) IV Continuous <Continuous>  dextrose 50% Injectable 12.5 Gram(s) IV Push once  dextrose 50% Injectable 25 Gram(s) IV Push once  dextrose 50% Injectable 25 Gram(s) IV Push once  docusate sodium 100 milliGRAM(s) Oral two times a day  hydrALAZINE 25 milliGRAM(s) Oral every 8 hours  insulin lispro (HumaLOG) corrective regimen sliding scale   SubCutaneous three times a day before meals  insulin lispro (HumaLOG) corrective regimen sliding scale   SubCutaneous at bedtime  metoclopramide Injectable 10 milliGRAM(s) IV Push every 12 hours  metoprolol     tartrate 25 milliGRAM(s) Oral every 12 hours  pantoprazole    Tablet 40 milliGRAM(s) Oral two times a day  polyethylene glycol 3350 17 Gram(s) Oral daily  senna 2 Tablet(s) Oral at bedtime  simvastatin 40 milliGRAM(s) Oral at bedtime    MEDICATIONS  (PRN):  acetaminophen   Tablet 650 milliGRAM(s) Oral every 6 hours PRN For Temp greater than 38 C (100.4 F)  acetaminophen   Tablet. 650 milliGRAM(s) Oral every 6 hours PRN Moderate Pain (4 - 6)  dextrose Gel 1 Dose(s) Oral once PRN Blood Glucose LESS THAN 70 milliGRAM(s)/deciliter  glucagon  Injectable 1 milliGRAM(s) IntraMuscular once PRN Glucose LESS THAN 70 milligrams/deciliter  hydrALAZINE Injectable 20 milliGRAM(s) IV Push every 6 hours PRN SBP > 160  ondansetron Injectable 4 milliGRAM(s) IV Push every 6 hours PRN Nausea      Allergies    No Known Allergies    Intolerances        Vital Signs Last 24 Hrs  T(C): 36.6 (19 Mar 2018 11:05), Max: 36.7 (18 Mar 2018 16:08)  T(F): 97.9 (19 Mar 2018 11:05), Max: 98 (18 Mar 2018 16:08)  HR: 66 (19 Mar 2018 11:05) (63 - 74)  BP: 166/82 (19 Mar 2018 11:05) (164/90 - 179/93)  BP(mean): --  RR: 20 (19 Mar 2018 11:05) (16 - 20)  SpO2: 97% (19 Mar 2018 09:41) (97% - 98%)  Daily     Daily Weight in k.5 (19 Mar 2018 09:49)    PHYSICAL EXAM:  GENERAL: appears chronically ill  HEAD: NCAT  EYES: EOMI  NECK: supple no JVD  NERVOUS SYSTEM:  A&O x 3 primarily Ukrainian speaking  CHEST/LUNG: decreased bs bases  HEART: + rub +S1S2  ABDOMEN: soft  EXTREMITIES: trace LE edema    LABS:                        9.4    9.8   )-----------( 489      ( 19 Mar 2018 05:56 )             28.4         126<L>  |  86<L>  |  30.0<H>  ----------------------------<  193<H>  3.8   |  24.0  |  4.53<H>    Ca    8.5<L>      19 Mar 2018 05:56  Phos  4.2       Mg     2.0           PT/INR - ( 19 Mar 2018 05:56 )   PT: 13.4 sec;   INR: 1.21 ratio         PTT - ( 19 Mar 2018 05:56 )  PTT:30.2 sec    Magnesium, Serum: 2.0 mg/dL ( @ 05:56)  Phosphorus Level, Serum: 4.2 mg/dL ( @ 05:56)          RADIOLOGY & ADDITIONAL TESTS:

## 2018-03-19 NOTE — PROGRESS NOTE ADULT - ASSESSMENT
Anemia of chronic disease, multifactorial.  Stable Hgb post transfusion of 2 units PRBC's earlier in hospital stay.  Currently asymptomatic.  Uremia and pericarditis/ pericardial effusion treated with HD.  Clinically stable.  Cath, HD and Permacath for later today.    Prior Colonoscopy negative 3 yrs ago.    Reasonable candidate for EGD tomorrow, pending cardiac clearance.  ASA #3.  Will keep NPO p MN tonight.

## 2018-03-19 NOTE — PROGRESS NOTE ADULT - SUBJECTIVE AND OBJECTIVE BOX
[facility]                                                        Office: [address] [place] [address]                                                       Telephone: [phone]. Fax:[phone]      CARDIOLOGY PROGRESS NOTE   ([facility])    Subjective: Patient seen and examined at bedside.  Denies chest pain, shortness of breath, palpitations, dizziness, nausea, bleeding. Was started on amiodarone over the weekend for VT. Cath cancelled today due to respiratory distress. Spoke with her with the use of a .     CURRENT MEDICATIONS  amiodarone    Tablet 400 milliGRAM(s) Oral every 12 hours  amiodarone Infusion 1 mG/Min IV Continuous <Continuous>  amiodarone Infusion 0.5 mG/Min IV Continuous <Continuous>  cloNIDine 0.1 milliGRAM(s) Oral two times a day  hydrALAZINE 25 milliGRAM(s) Oral every 8 hours  metoprolol     tartrate 25 milliGRAM(s) Oral every 12 hours  acetaminophen   Tablet 650 milliGRAM(s) Oral every 6 hours PRN  acetaminophen   Tablet. 650 milliGRAM(s) Oral every 6 hours PRN  metoclopramide Injectable 10 milliGRAM(s) IV Push every 12 hours  ondansetron Injectable 4 milliGRAM(s) IV Push every 6 hours PRN  docusate sodium 100 milliGRAM(s) Oral two times a day  pantoprazole    Tablet 40 milliGRAM(s) Oral two times a day  polyethylene glycol 3350 17 Gram(s) Oral daily  senna 2 Tablet(s) Oral at bedtime  colchicine 0.6 milliGRAM(s) Oral two times a day  dextrose 50% Injectable 12.5 Gram(s) IV Push once  dextrose 50% Injectable 25 Gram(s) IV Push once  dextrose 50% Injectable 25 Gram(s) IV Push once  dextrose Gel 1 Dose(s) Oral once PRN  glucagon  Injectable 1 milliGRAM(s) IntraMuscular once PRN  insulin lispro (HumaLOG) corrective regimen sliding scale   SubCutaneous three times a day before meals  insulin lispro (HumaLOG) corrective regimen sliding scale   SubCutaneous at bedtime  simvastatin 40 milliGRAM(s) Oral at bedtime  artificial  tears Solution 1 Drop(s) Both EYES every 12 hours  aspirin enteric coated 81 milliGRAM(s) Oral daily  dextrose 5%. 1000 milliLiter(s) IV Continuous <Continuous>  	  TELEMETRY: SR, will need to review all of telemetry over the weekend  Vitals:  T(C): 36.6 (03-19-18 @ 09:41), Max: 36.8 (03-18-18 @ 11:08)  HR: 63 (03-19-18 @ 09:41) (63 - 84)  BP: 179/93 (03-19-18 @ 09:41) (144/80 - 179/93)  RR: 20 (03-19-18 @ 09:41) (16 - 20)  SpO2: 97% (03-19-18 @ 09:41) (96% - 98%)  Wt(kg): --  I&O's Summary    18 Mar 2018 07:01  -  19 Mar 2018 07:00  --------------------------------------------------------  IN: 253.6 mL / OUT: 150 mL / NET: 103.6 mL    PHYSICAL EXAM:  Appearance: Normal, tachypneic	  HEENT:   Normal oral mucosa, PERRL, EOMI	  Lymphatic: No lymphadenopathy  Cardiovascular: Normal S1 S2, No JVD, III/VI systolic murmur, No edema; + pericardial rub  Respiratory: mid to base bilateral lung fields decreased breath sounds  Psychiatry: A & O x 3, Mood & affect appropriate  Gastrointestinal:  Soft, Non-tender, + BS	  Skin: No rashes, No ecchymoses, No cyanosis  Neurologic: Non-focal  Extremities: Normal range of motion, No clubbing, cyanosis or edema  Vascular: Peripheral pulses palpable 2+ bilaterally      ECG (tracing reviewed by me):  	    DIAGNOSTIC TESTING:  Echocardiogram (images reviewed by me):   Catheterization:  Stress Test:    CXR (image reviewed by me): 3/16/18   IMPRESSION:   Increasing bibasilar infiltrates and/or effusions..          reviewed images myself - compared with CXR 3/19/18 - unchanged - moderate to large bilateral pleural effusions, vascular congestion                          9.4    9.8   )-----------( 489      ( 19 Mar 2018 05:56 )             28.4     03-19    126<L>  |  86<L>  |  30.0<H>  ----------------------------<  193<H>  3.8   |  24.0  |  4.53<H>    Ca    8.5<L>      19 Mar 2018 05:56  Phos  4.2     03-19  Mg     2.0     03-19 [facility]                                                        Office: [address] [place] [address]                                                       Telephone: [phone]. Fax:[phone]      CARDIOLOGY PROGRESS NOTE   ([facility])    Subjective: Patient seen and examined at bedside.  Denies chest pain, shortness of breath, palpitations, dizziness, nausea, bleeding. Was started on amiodarone over the weekend for VT. Feels fatigued. Cath cancelled today due to respiratory distress. Spoke with her with the use of a .     CURRENT MEDICATIONS  amiodarone    Tablet 400 milliGRAM(s) Oral every 12 hours  amiodarone Infusion 1 mG/Min IV Continuous <Continuous>  amiodarone Infusion 0.5 mG/Min IV Continuous <Continuous>  cloNIDine 0.1 milliGRAM(s) Oral two times a day  hydrALAZINE 25 milliGRAM(s) Oral every 8 hours  metoprolol     tartrate 25 milliGRAM(s) Oral every 12 hours  acetaminophen   Tablet 650 milliGRAM(s) Oral every 6 hours PRN  acetaminophen   Tablet. 650 milliGRAM(s) Oral every 6 hours PRN  metoclopramide Injectable 10 milliGRAM(s) IV Push every 12 hours  ondansetron Injectable 4 milliGRAM(s) IV Push every 6 hours PRN  docusate sodium 100 milliGRAM(s) Oral two times a day  pantoprazole    Tablet 40 milliGRAM(s) Oral two times a day  polyethylene glycol 3350 17 Gram(s) Oral daily  senna 2 Tablet(s) Oral at bedtime  colchicine 0.6 milliGRAM(s) Oral two times a day  dextrose 50% Injectable 12.5 Gram(s) IV Push once  dextrose 50% Injectable 25 Gram(s) IV Push once  dextrose 50% Injectable 25 Gram(s) IV Push once  dextrose Gel 1 Dose(s) Oral once PRN  glucagon  Injectable 1 milliGRAM(s) IntraMuscular once PRN  insulin lispro (HumaLOG) corrective regimen sliding scale   SubCutaneous three times a day before meals  insulin lispro (HumaLOG) corrective regimen sliding scale   SubCutaneous at bedtime  simvastatin 40 milliGRAM(s) Oral at bedtime  artificial  tears Solution 1 Drop(s) Both EYES every 12 hours  aspirin enteric coated 81 milliGRAM(s) Oral daily  dextrose 5%. 1000 milliLiter(s) IV Continuous <Continuous>  	  TELEMETRY: SR, reviewed telemetry over the weekend - several episodes of what appears to be wide complex tachycardia, however upon review, artifactual as normal SR with normal ventricular conduction  marches through one of the leads during all these episodes.    Vitals:  T(C): 36.6 (03-19-18 @ 09:41), Max: 36.8 (03-18-18 @ 11:08)  HR: 63 (03-19-18 @ 09:41) (63 - 84)  BP: 179/93 (03-19-18 @ 09:41) (144/80 - 179/93)  RR: 20 (03-19-18 @ 09:41) (16 - 20)  SpO2: 97% (03-19-18 @ 09:41) (96% - 98%)  Wt(kg): --  I&O's Summary    18 Mar 2018 07:01  -  19 Mar 2018 07:00  --------------------------------------------------------  IN: 253.6 mL / OUT: 150 mL / NET: 103.6 mL    PHYSICAL EXAM:  Appearance: Normal, tachypneic	  HEENT:   Normal oral mucosa, PERRL, EOMI	  Lymphatic: No lymphadenopathy  Cardiovascular: Normal S1 S2, No JVD, III/VI systolic murmur, No edema; + pericardial rub  Respiratory: mid to base bilateral lung fields decreased breath sounds  Psychiatry: A & O x 3, Mood & affect appropriate  Gastrointestinal:  Soft, Non-tender, + BS	  Skin: No rashes, No ecchymoses, No cyanosis  Neurologic: Non-focal  Extremities: Normal range of motion, No clubbing, cyanosis or edema  Vascular: Peripheral pulses palpable 2+ bilaterally      ECG (tracing reviewed by me):  	    DIAGNOSTIC TESTING:    CXR (image reviewed by me): 3/16/18   IMPRESSION:   Increasing bibasilar infiltrates and/or effusions..          reviewed images myself - compared with CXR 3/19/18 - unchanged - moderate to large bilateral pleural effusions, vascular congestion                          9.4    9.8   )-----------( 489      ( 19 Mar 2018 05:56 )             28.4     03-19    126<L>  |  86<L>  |  30.0<H>  ----------------------------<  193<H>  3.8   |  24.0  |  4.53<H>    Ca    8.5<L>      19 Mar 2018 05:56  Phos  4.2     03-19  Mg     2.0     03-19

## 2018-03-19 NOTE — PROGRESS NOTE ADULT - SUBJECTIVE AND OBJECTIVE BOX
Cardiology Nurse Practitioner Note    Pt was transferred from  to cath lab for [facility].  Pt significant SOB on 5L NC.  No breath sounds at b/l bases and pericardial rub.  Dr. Godinez attended.  Case cancelled today.  Pt to be optimized prior to procedure.  Will get CXR.  Dr. Salazar aware. Cardiology Nurse Practitioner Note    Pt was transferred from  to cath lab for [facility].  Pt with significant SOB on 5L NC.  No breath sounds at b/l bases and pericardial rub.  Dr. Godinez attended.  Case cancelled today.  Pt to be optimized prior to procedure.  Will get CXR.  Dr. Salazar and Dr. Maciel.

## 2018-03-19 NOTE — PROGRESS NOTE ADULT - SUBJECTIVE AND OBJECTIVE BOX
Patient is a 57y old  Female who presents with a chief complaint of N/V and dark stools noted to be in acute pulm edema/pericardial effusion with ARF requiring HD (14 Mar 2018 02:54)    INTERVAL HPI/OVERNIGHT EVENTS:  Patient seen and examined at bedside. No acute events overnight. Patient states she is able to sleep, no further episodes of nausea, vomiting or hematemesis, reports improvement of her shortness of breath from before, had a bowel movement this morning it was dark in color, patient currently on IV iron. Understands that she may need dialysis again today and that her femoral line in the groin will eventually need to be switched to a PC depending on her need for hemodialysis. Patient reports epigastric pain radiated to RUQ that initially improved after admission but that it became more intense last night. Denies headache, lower extremity edema. Otherwise ROS unremarkable.    Vital Signs Last 24 Hrs  T(C): 36.7 (18 Mar 2018 05:19), Max: 36.9 (17 Mar 2018 19:15)  T(F): 98 (18 Mar 2018 05:19), Max: 98.4 (17 Mar 2018 19:15)  HR: 82 (18 Mar 2018 05:19) (74 - 84)  BP: 180/96 (18 Mar 2018 05:19) (148/64 - 180/96)  BP(mean): --  RR: 16 (18 Mar 2018 05:19) (16 - 18)  SpO2: 96% (18 Mar 2018 05:19) (93% - 96%)    Telemonitor: Sinus rhythm, intermittently in and out of ventricular tachycardia, asymptomatic.    PHYSICAL EXAM:  	Constitutional: NAD, laying in bed, pale-appearing  	HEENT: PERRLA, EOMI ; conjunctival pallor  	Neck: No JVD, supple  	Back: Normal spine flexure, No CVA tenderness  	Respiratory: Tachypneic, decreased air entry, no wheezing, rales or rhonchi.   	Cardiovascular: S1 and S2, RRR, no m/r/g   	Gastrointestinal: BS+ normoactive, soft, ND, NTTP   	Extremities: +1 pitting edema b/l up to knees  	Vascular: 2+ peripheral pulses; cap refill >2 sec, pallor to extremities and nailbeds  	Neurological: AAO x 3, no focal deficits  	Psychiatric: Normal mood, normal affect  	Musculoskeletal: 5/5 strength b/l upper and lower extremities    Skin: No rashes, no diaphoresis   Left forearm PPD circled 0mm,    R femoral groin line in place  C/D/I     I&O's Detail    17 Mar 2018 07:01  -  18 Mar 2018 07:00  --------------------------------------------------------  IN:    Oral Fluid: 480 mL  Total IN: 480 mL    OUT:    Other: 1600 mL    Voided: 400 mL  Total OUT: 2000 mL    Total NET: -1520 mL                        10.1   7.9   )-----------( 502      ( 18 Mar 2018 05:50 )             31.4     03-18    132<L>  |  92<L>  |  24.0<H>  ----------------------------<  165<H>  3.9   |  24.0  |  3.50<H>    Ca    8.5<L>      18 Mar 2018 05:50    MEDICATIONS  (STANDING):  artificial  tears Solution 1 Drop(s) Both EYES every 12 hours  aspirin enteric coated 81 milliGRAM(s) Oral daily  cloNIDine 0.1 milliGRAM(s) Oral two times a day  colchicine 0.6 milliGRAM(s) Oral two times a day  dextrose 5%. 1000 milliLiter(s) (50 mL/Hr) IV Continuous <Continuous>  dextrose 50% Injectable 12.5 Gram(s) IV Push once  dextrose 50% Injectable 25 Gram(s) IV Push once  dextrose 50% Injectable 25 Gram(s) IV Push once  diltiazem    Tablet 30 milliGRAM(s) Oral every 6 hours  docusate sodium 100 milliGRAM(s) Oral two times a day  hydrALAZINE 25 milliGRAM(s) Oral every 8 hours  insulin lispro (HumaLOG) corrective regimen sliding scale   SubCutaneous three times a day before meals  insulin lispro (HumaLOG) corrective regimen sliding scale   SubCutaneous at bedtime  iron sucrose IVPB 200 milliGRAM(s) IV Intermittent daily  metoclopramide Injectable 10 milliGRAM(s) IV Push every 12 hours  pantoprazole    Tablet 40 milliGRAM(s) Oral two times a day  polyethylene glycol 3350 17 Gram(s) Oral daily  senna 2 Tablet(s) Oral at bedtime  simvastatin 40 milliGRAM(s) Oral at bedtime    MEDICATIONS  (PRN):  acetaminophen   Tablet 650 milliGRAM(s) Oral every 6 hours PRN For Temp greater than 38 C (100.4 F)  acetaminophen   Tablet. 650 milliGRAM(s) Oral every 6 hours PRN Moderate Pain (4 - 6)  dextrose Gel 1 Dose(s) Oral once PRN Blood Glucose LESS THAN 70 milliGRAM(s)/deciliter  glucagon  Injectable 1 milliGRAM(s) IntraMuscular once PRN Glucose LESS THAN 70 milligrams/deciliter  ondansetron Injectable 4 milliGRAM(s) IV Push every 6 hours PRN Nausea Patient is a 57y old  Female who presents with a chief complaint of N/V and dark stools noted to be in acute pulm edema/pericardial effusion with ARF requiring HD (14 Mar 2018 02:54)    INTERVAL HPI/OVERNIGHT EVENTS:  Patient seen and examined at bedside. No acute events overnight. Patient states she is able to sleep, no further episodes of nausea, vomiting or hematemesis, still reports shortness of breath and still requiring 5L/min supplemental O2 by NC, last bowel movement yesterday. Patient reports improvement of her epigastric pain, had chest pain last night but now is better. Denies headache, lower extremity edema, palpitations. Otherwise ROS unremarkable.    Vital Signs Last 24 Hrs  T(C): 36.6 (19 Mar 2018 11:05), Max: 36.7 (18 Mar 2018 16:08)  T(F): 97.9 (19 Mar 2018 11:05), Max: 98 (18 Mar 2018 16:08)  HR: 66 (19 Mar 2018 11:05) (63 - 74)  BP: 166/82 (19 Mar 2018 11:05) (144/80 - 179/93)  BP(mean): --  RR: 20 (19 Mar 2018 11:05) (16 - 20)  SpO2: 97% (19 Mar 2018 09:41) (97% - 98%)    Telemonitor: Sinus rhythm, one episode of PVCs overnight.    PHYSICAL EXAM:  	Constitutional: NAD, laying in bed, pale-appearing  	HEENT: PERRLA, EOMI ; conjunctival pallor  	Neck: No JVD, supple  	Back: Normal spine flexure, No CVA tenderness  	Respiratory: Tachypneic, decreased air entry, particularly on the left side, no wheezing, rales or rhonchi.   	Cardiovascular: S1 and S2, RRR, no m/r/g   	Gastrointestinal: BS+ normoactive, soft, ND, NTTP   	Extremities: +1 pitting edema b/l up to knees  	Vascular: 2+ peripheral pulses; cap refill >2 sec, pallor to extremities and nailbeds  	Neurological: AAO x 3, no focal deficits  	Psychiatric: Normal mood, normal affect  	Musculoskeletal: 5/5 strength b/l upper and lower extremities    Skin: No rashes, no diaphoresis   Left forearm PPD circled 0mm,    R femoral groin line in place  C/D/I     I&O's Detail    18 Mar 2018 07:01  -  19 Mar 2018 07:00  --------------------------------------------------------  IN:    amiodarone Infusion: 133.6 mL    Oral Fluid: 120 mL  Total IN: 253.6 mL    OUT:    Voided: 150 mL  Total OUT: 150 mL    Total NET: 103.6 mL                                   9.4    9.8   )-----------( 489      ( 19 Mar 2018 05:56 )             28.4   03-19    126<L>  |  86<L>  |  30.0<H>  ----------------------------<  193<H>  3.8   |  24.0  |  4.53<H>    Ca    8.5<L>      19 Mar 2018 05:56  Phos  4.2     03-19  Mg     2.0     03-19    MEDICATIONS  (STANDING):  amiodarone    Tablet 400 milliGRAM(s) Oral every 12 hours  amiodarone Infusion 1 mG/Min (33.333 mL/Hr) IV Continuous <Continuous>  amiodarone Infusion 0.5 mG/Min (16.667 mL/Hr) IV Continuous <Continuous>  artificial  tears Solution 1 Drop(s) Both EYES every 12 hours  aspirin enteric coated 81 milliGRAM(s) Oral daily  cloNIDine 0.1 milliGRAM(s) Oral two times a day  colchicine 0.6 milliGRAM(s) Oral two times a day  dextrose 5%. 1000 milliLiter(s) (50 mL/Hr) IV Continuous <Continuous>  dextrose 50% Injectable 12.5 Gram(s) IV Push once  dextrose 50% Injectable 25 Gram(s) IV Push once  dextrose 50% Injectable 25 Gram(s) IV Push once  docusate sodium 100 milliGRAM(s) Oral two times a day  hydrALAZINE 25 milliGRAM(s) Oral every 8 hours  insulin lispro (HumaLOG) corrective regimen sliding scale   SubCutaneous three times a day before meals  insulin lispro (HumaLOG) corrective regimen sliding scale   SubCutaneous at bedtime  metoclopramide Injectable 10 milliGRAM(s) IV Push every 12 hours  metoprolol     tartrate 25 milliGRAM(s) Oral every 12 hours  pantoprazole    Tablet 40 milliGRAM(s) Oral two times a day  polyethylene glycol 3350 17 Gram(s) Oral daily  senna 2 Tablet(s) Oral at bedtime  simvastatin 40 milliGRAM(s) Oral at bedtime    MEDICATIONS  (PRN):  acetaminophen   Tablet 650 milliGRAM(s) Oral every 6 hours PRN For Temp greater than 38 C (100.4 F)  acetaminophen   Tablet. 650 milliGRAM(s) Oral every 6 hours PRN Moderate Pain (4 - 6)  dextrose Gel 1 Dose(s) Oral once PRN Blood Glucose LESS THAN 70 milliGRAM(s)/deciliter  glucagon  Injectable 1 milliGRAM(s) IntraMuscular once PRN Glucose LESS THAN 70 milligrams/deciliter  hydrALAZINE Injectable 20 milliGRAM(s) IV Push every 6 hours PRN SBP > 160  ondansetron Injectable 4 milliGRAM(s) IV Push every 6 hours PRN Nausea Patient is a 57y old  Female who presents with a chief complaint of N/V and dark stools noted to be in acute pulm edema/pericardial effusion with ARF requiring HD (14 Mar 2018 02:54)    INTERVAL HPI/OVERNIGHT EVENTS:  Patient seen and examined at bedside. No acute events overnight. Patient states she is able to sleep, no further episodes of nausea, vomiting or hematemesis, still reports shortness of breath and still requiring 5L/min supplemental O2 by NC, last bowel movement yesterday. Patient reports improvement of her epigastric pain, had chest pain last night but now is better. Denies headache, lower extremity edema, palpitations. Otherwise ROS unremarkable.    Vital Signs Last 24 Hrs  T(C): 36.6 (19 Mar 2018 11:05), Max: 36.7 (18 Mar 2018 16:08)  T(F): 97.9 (19 Mar 2018 11:05), Max: 98 (18 Mar 2018 16:08)  HR: 66 (19 Mar 2018 11:05) (63 - 74)  BP: 166/82 (19 Mar 2018 11:05) (144/80 - 179/93)  RR: 20 (19 Mar 2018 11:05) (16 - 20)  SpO2: 97% (19 Mar 2018 09:41) (97% - 98%)    Telemonitor: Sinus rhythm, one episode of PVCs overnight.    PHYSICAL EXAM:  	Constitutional: NAD, laying in bed, pale-appearing  	HEENT: PERRLA, EOMI ; conjunctival pallor  	Neck: No JVD, supple  	Back: Normal spine flexure, No CVA tenderness  	Respiratory: Tachypneic, decreased air entry, particularly on the left side, no wheezing, rales or rhonchi.   	Cardiovascular: S1 and S2, RRR, no m/r/g   	Gastrointestinal: BS+ normoactive, soft, ND, NTTP   	Extremities: +1 pitting edema b/l up to knees  	Vascular: 2+ peripheral pulses; cap refill >2 sec, pallor to extremities and nailbeds  	Neurological: AAO x 3, no focal deficits  	Psychiatric: Normal mood, normal affect  	Musculoskeletal: 5/5 strength b/l upper and lower extremities    Skin: No rashes, no diaphoresis   Left forearm PPD circled 0mm,    R femoral groin line in place  C/D/I     I&O's Detail    18 Mar 2018 07:01  -  19 Mar 2018 07:00  --------------------------------------------------------  IN:    amiodarone Infusion: 133.6 mL    Oral Fluid: 120 mL  Total IN: 253.6 mL    OUT:    Voided: 150 mL  Total OUT: 150 mL    Total NET: 103.6 mL                                   9.4    9.8   )-----------( 489      ( 19 Mar 2018 05:56 )             28.4   03-19    126<L>  |  86<L>  |  30.0<H>  ----------------------------<  193<H>  3.8   |  24.0  |  4.53<H>    Ca    8.5<L>      19 Mar 2018 05:56  Phos  4.2     03-19  Mg     2.0     03-19    MEDICATIONS  (STANDING):  amiodarone    Tablet 400 milliGRAM(s) Oral every 12 hours  amiodarone Infusion 1 mG/Min (33.333 mL/Hr) IV Continuous <Continuous>  amiodarone Infusion 0.5 mG/Min (16.667 mL/Hr) IV Continuous <Continuous>  artificial  tears Solution 1 Drop(s) Both EYES every 12 hours  aspirin enteric coated 81 milliGRAM(s) Oral daily  cloNIDine 0.1 milliGRAM(s) Oral two times a day  colchicine 0.6 milliGRAM(s) Oral two times a day  dextrose 5%. 1000 milliLiter(s) (50 mL/Hr) IV Continuous <Continuous>  dextrose 50% Injectable 12.5 Gram(s) IV Push once  dextrose 50% Injectable 25 Gram(s) IV Push once  dextrose 50% Injectable 25 Gram(s) IV Push once  docusate sodium 100 milliGRAM(s) Oral two times a day  hydrALAZINE 25 milliGRAM(s) Oral every 8 hours  insulin lispro (HumaLOG) corrective regimen sliding scale   SubCutaneous three times a day before meals  insulin lispro (HumaLOG) corrective regimen sliding scale   SubCutaneous at bedtime  metoclopramide Injectable 10 milliGRAM(s) IV Push every 12 hours  metoprolol     tartrate 25 milliGRAM(s) Oral every 12 hours  pantoprazole    Tablet 40 milliGRAM(s) Oral two times a day  polyethylene glycol 3350 17 Gram(s) Oral daily  senna 2 Tablet(s) Oral at bedtime  simvastatin 40 milliGRAM(s) Oral at bedtime    MEDICATIONS  (PRN):  acetaminophen   Tablet 650 milliGRAM(s) Oral every 6 hours PRN For Temp greater than 38 C (100.4 F)  acetaminophen   Tablet. 650 milliGRAM(s) Oral every 6 hours PRN Moderate Pain (4 - 6)  dextrose Gel 1 Dose(s) Oral once PRN Blood Glucose LESS THAN 70 milliGRAM(s)/deciliter  glucagon  Injectable 1 milliGRAM(s) IntraMuscular once PRN Glucose LESS THAN 70 milligrams/deciliter  hydrALAZINE Injectable 20 milliGRAM(s) IV Push every 6 hours PRN SBP > 160  ondansetron Injectable 4 milliGRAM(s) IV Push every 6 hours PRN Nausea Patient is a 57y old  Female who presents with a chief complaint of N/V and dark stools noted to be in acute pulm edema/pericardial effusion with ARF requiring HD (14 Mar 2018 02:54)    INTERVAL HPI/OVERNIGHT EVENTS:  Patient seen and examined at bedside. No acute events overnight. Patient states she is able to sleep, no further episodes of nausea, vomiting or hematemesis, still reports shortness of breath and still requiring 5L/min supplemental O2 by NC, last bowel movement yesterday. Patient reports improvement of her epigastric pain, had chest pain last night but now is better. Denies headache, lower extremity edema, palpitations    Vital Signs Last 24 Hrs  T(C): 36.6 (19 Mar 2018 11:05), Max: 36.7 (18 Mar 2018 16:08)  T(F): 97.9 (19 Mar 2018 11:05), Max: 98 (18 Mar 2018 16:08)  HR: 66 (19 Mar 2018 11:05) (63 - 74)  BP: 166/82 (19 Mar 2018 11:05) (144/80 - 179/93)  RR: 20 (19 Mar 2018 11:05) (16 - 20)  SpO2: 97% (19 Mar 2018 09:41) (97% - 98%)    Telemonitor: Sinus rhythm, one episode of PVCs overnight.    PHYSICAL EXAM:  	Constitutional: NAD, laying in bed, pale-appearing  	HEENT: PERRLA, EOMI ; conjunctival pallor  	Neck: No JVD, supple  	Back: Normal spine flexure, No CVA tenderness  	Respiratory: Tachypneic, decreased air entry, particularly on the left side, no wheezing, rales or rhonchi.   	Cardiovascular: S1 and S2, RRR, no m/r/g   	Gastrointestinal: BS+ normoactive, soft, ND, NTTP   	Extremities: +1 pitting edema b/l up to knees  	Vascular: 2+ peripheral pulses; cap refill >2 sec, pallor to extremities and nailbeds  	Neurological: AAO x 3, no focal deficits  	Psychiatric: Normal mood, normal affect  	Musculoskeletal: 5/5 strength b/l upper and lower extremities    Skin: No rashes, no diaphoresis   Left forearm PPD circled 0mm,    R femoral groin line in place  C/D/I     I&O's Detail    18 Mar 2018 07:01  -  19 Mar 2018 07:00  --------------------------------------------------------  IN:    amiodarone Infusion: 133.6 mL    Oral Fluid: 120 mL  Total IN: 253.6 mL    OUT:    Voided: 150 mL  Total OUT: 150 mL    Total NET: 103.6 mL                                   9.4    9.8   )-----------( 489      ( 19 Mar 2018 05:56 )             28.4   03-19    126<L>  |  86<L>  |  30.0<H>  ----------------------------<  193<H>  3.8   |  24.0  |  4.53<H>    Ca    8.5<L>      19 Mar 2018 05:56  Phos  4.2     03-19  Mg     2.0     03-19      MEDICATIONS  (STANDING):  amiodarone    Tablet 400 milliGRAM(s) Oral every 12 hours  amiodarone Infusion 1 mG/Min (33.333 mL/Hr) IV Continuous <Continuous>  amiodarone Infusion 0.5 mG/Min (16.667 mL/Hr) IV Continuous <Continuous>  artificial  tears Solution 1 Drop(s) Both EYES every 12 hours  aspirin enteric coated 81 milliGRAM(s) Oral daily  cloNIDine 0.1 milliGRAM(s) Oral two times a day  colchicine 0.6 milliGRAM(s) Oral two times a day  dextrose 5%. 1000 milliLiter(s) (50 mL/Hr) IV Continuous <Continuous>  dextrose 50% Injectable 12.5 Gram(s) IV Push once  dextrose 50% Injectable 25 Gram(s) IV Push once  dextrose 50% Injectable 25 Gram(s) IV Push once  docusate sodium 100 milliGRAM(s) Oral two times a day  hydrALAZINE 25 milliGRAM(s) Oral every 8 hours  insulin lispro (HumaLOG) corrective regimen sliding scale   SubCutaneous three times a day before meals  insulin lispro (HumaLOG) corrective regimen sliding scale   SubCutaneous at bedtime  metoclopramide Injectable 10 milliGRAM(s) IV Push every 12 hours  metoprolol     tartrate 25 milliGRAM(s) Oral every 12 hours  pantoprazole    Tablet 40 milliGRAM(s) Oral two times a day  polyethylene glycol 3350 17 Gram(s) Oral daily  senna 2 Tablet(s) Oral at bedtime  simvastatin 40 milliGRAM(s) Oral at bedtime    MEDICATIONS  (PRN):  acetaminophen   Tablet 650 milliGRAM(s) Oral every 6 hours PRN For Temp greater than 38 C (100.4 F)  acetaminophen   Tablet. 650 milliGRAM(s) Oral every 6 hours PRN Moderate Pain (4 - 6)  dextrose Gel 1 Dose(s) Oral once PRN Blood Glucose LESS THAN 70 milliGRAM(s)/deciliter  glucagon  Injectable 1 milliGRAM(s) IntraMuscular once PRN Glucose LESS THAN 70 milligrams/deciliter  hydrALAZINE Injectable 20 milliGRAM(s) IV Push every 6 hours PRN SBP > 160  ondansetron Injectable 4 milliGRAM(s) IV Push every 6 hours PRN Nausea        Radiology  Chest Xray 3/19  Bilateral pleural effusions and bibasilar atelectasis without significant   change..

## 2018-03-19 NOTE — PROGRESS NOTE ADULT - PROBLEM SELECTOR PLAN 4
- Will discontinue Diltiazem and will start Amiodarone drip with bridging to PO and metoprolol as per Cardiology recommendation. -No further episodes of AFib  -no AC at this time until GI work up completed

## 2018-03-19 NOTE — PROGRESS NOTE ADULT - PROBLEM SELECTOR PLAN 1
baseline ckd 3 now in ARF with pulm edema/ uremia sx/ uremic pericarditis requiring dialysis     -pt still clinically sob, tachypneic   -bun/cr noted and continues to be trended   - c/w strict I/O      -daily weight   -s/p dialysis yest and may require dialysis today depending on nephro evaluation    -hyponatremia is secondary to worsening renal function   -avoid nephrotoxic medications   -renal US noted with chronic medical renal disease no evidence of hydronephrosis   -nephro f/u noted and appreciated and patient likely to require long-term hemodialysis.  -Acute hepatitis panel negative, Hepb non immune      -ppd at 48 hours 0mm negative thus far    -pt will need a HD seat in the community   -vascular consult noted and appreciated for femoral access which was obtained yest; aware that the femoral line has to come out and PC needed. As per vascular will be placed on monday. baseline ckd 3 now in ARF with pulm edema/ uremia sx/ uremic pericarditis requiring dialysis     -pt still clinically sob, tachypneic   -bun/cr noted and continues to be trended   - c/w strict I/O      -daily weight   -Frequency of dialysis according to nephrology recommendations, likely to require it in the long term, will need seat in the community.  -hyponatremia is secondary to worsening renal function   -avoid nephrotoxic medications   -renal US noted with chronic medical renal disease no evidence of hydronephrosis   -Acute hepatitis panel negative, Hepb non immune      -ppd at 48 hours 0mm negative thus far   -vascular consult noted and appreciated for femoral access. 1.6 liters fluid taken off HD on 3/18;   next due hd on 3/20  Spoke with IR attending, agrees for Pleurocentesis tomorrow, patient npo after midnight;   Bilateral pleural effusions and pericardial effusion, noted tte with preserved EF, likely due to worsening renal failure but unclear if component of HFPEF. Now improving clincally after HD sessions, worsening pleural effusions on CXR, will consult IR for pleurocentesis.  -TTE shows preserved EF and moderate pericardial effusion   -c/w supplemental o2 as needed   -c/w colchicine 0.6mg po bid for presumed pericarditis   -Hemodialysis as per nephrology recommendations.

## 2018-03-19 NOTE — DIETITIAN INITIAL EVALUATION ADULT. - PROBLEM SELECTOR PLAN 7
Chronic DMII controlled Janumet  BID-held due to [facility]  -low dose HSS while inpatient, will adjust as needed  -0.3 x 68kg , 20 units likely needed based on weight

## 2018-03-19 NOTE — PROGRESS NOTE ADULT - ASSESSMENT
57 y.o. F with hx of HTN, HLD, DMII, hyperkalemia, ckd 3 who presented with 3 weeks of postprandial vomiting and abd pain, noted to have ARF, fluid overload with bilateral pleural effusions and pericardial effusion as well as symptomatic anemia likely multifactorial secondary to acute on chronic renal failure/ KULWANT and suspected upper GI Bleeding from vomiting/retching and NSAID use. Clinical concern that worsening acute on chronic renal failure possibly caused uremia related sx of N/V, with patients NSAID use and retching suspected GI bleed but occult blood returned negative, for dropping h/h pt received 2 u prbc on admission with appropriate response to therapy. 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 In the interim while being dialyzed the patient had an episode of PAF, s/p cardizem became rate controlled and remained in NSR. EP consulted. Unable to AC the patient due to initial concern for GI bleeding, for which GI consulted and pt pending possible GI intervention when respiratory status improves. Clinical concern for ARF causing volume overload, unclear if HFPEF is a contributing factor. Patient will need eventual ischemia evaluation with cardiology with LHC possibly cath tomorrow. 57 y.o. F with hx of HTN, HLD, DMII, hyperkalemia, ckd 3 who presented with 3 weeks of postprandial vomiting and abd pain, noted to have ARF, fluid overload with bilateral pleural effusions and pericardial effusion as well as symptomatic anemia likely multifactorial secondary to acute on chronic renal failure/ KULWANT and suspected upper GI Bleeding from vomiting/retching and NSAID use. Clinical concern that worsening acute on chronic renal failure possibly caused uremia related sx of N/V, with patients NSAID use and retching suspected GI bleed but occult blood returned negative, for dropping h/h pt received 2 u prbc on admission with appropriate response to therapy. 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 In the interim while being dialyzed the patient had an episode of PAF, s/p cardizem became rate controlled and remained in NSR. EP consulted. Unable to AC the patient due to initial concern for GI bleeding, for which GI consulted and pt pending possible GI intervention when respiratory status improves. Clinical concern for ARF causing volume overload, unclear if HFPEF is a contributing factor. Patient will need eventual ischemia evaluation with cardiology, cath deferred today due to respiratory distress, pleural effusions worsening on CXR, IR consulted for pleurocentesis 57 y.o. F with hx of HTN, HLD, DMII, hyperkalemia, ckd 3 who presented with 3 weeks of postprandial vomiting and abd pain, noted to have ARF, fluid overload with bilateral pleural effusions and pericardial effusion as well as symptomatic anemia likely multifactorial secondary to acute on chronic renal failure/ KULWANT and suspected upper GI Bleeding from vomiting/retching and NSAID use. Clinical concern that worsening acute on chronic renal failure possibly caused uremia related sx of N/V, with patients NSAID use and retching suspected GI bleed but occult blood returned negative, for dropping h/h pt received 2 u prbc on admission with appropriate response to therapy. 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 In the interim while being dialyzed the patient had an episode of PAF, s/p cardizem became rate controlled and remained in NSR. EP consulted. Unable to AC the patient due to initial concern for GI bleeding, for which GI consulted and pt pending possible GI intervention when respiratory status improves. Clinical concern for ARF causing volume overload, unclear if HFPEF is a contributing factor. Patient will need eventual ischemia evaluation with cardiology, cath deferred today due to respiratory distress, pleural effusions worsening on CXR, IR consulted for pleurocentesis.     S/P Hemodialysis on [place] 3/18, 1.6 Liters taken off; 57 y.o. F with hx of HTN, HLD, DMII, hyperkalemia, ckd 3 who presented with 3 weeks of postprandial vomiting and abd pain, noted to have ARF, fluid overload with bilateral pleural effusions and pericardial effusion as well as symptomatic anemia likely multifactorial secondary to acute on chronic renal failure/ KULWANT and suspected upper GI Bleeding from vomiting/retching and NSAID use. Clinical concern that worsening acute on chronic renal failure possibly caused uremia related sx of N/V, with patients NSAID use and retching suspected GI bleed but occult blood returned negative, for dropping h/h pt received 2 u prbc on admission with appropriate response to therapy. 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 In the interim while being dialyzed the patient had an episode of PAF, s/p cardizem became rate controlled and remained in NSR. EP consulted. Unable to AC the patient due to initial concern for GI bleeding, for which GI consulted and pt pending possible GI intervention when respiratory status improves. Clinical concern for ARF causing volume overload, unclear if HFPEF is a contributing factor. Patient will need eventual ischemia evaluation with cardiology, cath deferred today due to respiratory distress, pleural effusions worsening on CXR, IR consulted for pleurocentesis.     S/P Hemodialysis on [place] 3/18, 1.6 Liters taken off;    Spoke with IR attending, agrees for Pleurocentesis tomorrow, patient npo after midnight;

## 2018-03-19 NOTE — PROGRESS NOTE ADULT - PROBLEM SELECTOR PLAN 2
Bilateral pleural effusions and pericardial effusion, noted tte with preserved EF, likely due to worsening renal failure but unclear if component of HFPEF. Now improving clincally after HD sessions, lungs clear  -TTE shows preserved EF and moderate pericardial effusion   -c/w supplemental o2 as needed   -c/w colchicine 0.6mg po bid for presumed pericarditis   - s/p 2 sessions of daily hemodialysis (started on 03/15/2018), may require today depending on nephro eval but patient clinically improved this morning.  -cardio f/u noted and appreciated and will need further ischemic evaluation with C possibly monday. Will need to arrange with nephro to have HD thereafter.   -nephro f/u noted and appreciated Bilateral pleural effusions and pericardial effusion, noted tte with preserved EF, likely due to worsening renal failure but unclear if component of HFPEF. Now improving clincally after HD sessions, worsening pleural effusions on CXR, will consult IR for pleurocentesis.  -TTE shows preserved EF and moderate pericardial effusion   -c/w supplemental o2 as needed   -c/w colchicine 0.6mg po bid for presumed pericarditis   -Hemodialysis as per nephrology recommendations. 1.6 liters fluid taken off HD on 3/18;   next due hd on 3/20    Bilateral pleural effusions and pericardial effusion, noted tte with preserved EF, likely due to worsening renal failure but unclear if component of HFPEF. Now improving clincally after HD sessions, worsening pleural effusions on CXR, will consult IR for pleurocentesis.  -TTE shows preserved EF and moderate pericardial effusion   -c/w supplemental o2 as needed   -c/w colchicine 0.6mg po bid for presumed pericarditis   -Hemodialysis as per nephrology recommendations. baseline ckd 3 now in ARF with pulm edema/ uremia sx/ uremic pericarditis requiring dialysis     -pt still clinically sob, tachypneic   -bun/cr noted and continues to be trended   - c/w strict I/O      -daily weight   -Frequency of dialysis according to nephrology recommendations, likely to require it in the long term, will need seat in the community.  -hyponatremia is secondary to worsening renal function   -avoid nephrotoxic medications   -renal US noted with chronic medical renal disease no evidence of hydronephrosis   -Acute hepatitis panel negative, Hepb non immune      -ppd at 48 hours 0mm negative thus far   -vascular consult noted and appreciated for femoral access.

## 2018-03-19 NOTE — PROGRESS NOTE ADULT - SUBJECTIVE AND OBJECTIVE BOX
Patient seen and examined;  Chart reviewed.  Initially anemic.  Equivocal Iron studies.  FOBT was negative.  Hx of Hematemesis.  No further bleeding;  Upper abdominal pain has resolved.  Offers no complaints.  Had a normal BM overnight, per gregory limon.  Hgb: 9.4.   Due for Cardiac Cath later today to be followed by Hemodialysis and possible Permacath.      PAST MEDICAL & SURGICAL HISTORY:  HLD (hyperlipidemia)  HTN (hypertension)  DM (diabetes mellitus)  No significant past surgical history      ROS:  No Heartburn, regurgitation, dysphagia, odynophagia.  No dyspepsia  No abdominal pain.    No Nausea, vomiting.  No Bleeding.  No hematemesis.   No diarrhea.    No hematochesia.  No weight loss, anorexia.  No edema.      MEDICATIONS  (STANDING):  amiodarone    Tablet 400 milliGRAM(s) Oral every 12 hours  amiodarone Infusion 1 mG/Min (33.333 mL/Hr) IV Continuous <Continuous>  amiodarone Infusion 0.5 mG/Min (16.667 mL/Hr) IV Continuous <Continuous>  artificial  tears Solution 1 Drop(s) Both EYES every 12 hours  aspirin enteric coated 81 milliGRAM(s) Oral daily  cloNIDine 0.1 milliGRAM(s) Oral two times a day  colchicine 0.6 milliGRAM(s) Oral two times a day  dextrose 5%. 1000 milliLiter(s) (50 mL/Hr) IV Continuous <Continuous>  dextrose 50% Injectable 12.5 Gram(s) IV Push once  dextrose 50% Injectable 25 Gram(s) IV Push once  dextrose 50% Injectable 25 Gram(s) IV Push once  docusate sodium 100 milliGRAM(s) Oral two times a day  hydrALAZINE 25 milliGRAM(s) Oral every 8 hours  insulin lispro (HumaLOG) corrective regimen sliding scale   SubCutaneous three times a day before meals  insulin lispro (HumaLOG) corrective regimen sliding scale   SubCutaneous at bedtime  metoclopramide Injectable 10 milliGRAM(s) IV Push every 12 hours  metoprolol     tartrate 25 milliGRAM(s) Oral every 12 hours  pantoprazole    Tablet 40 milliGRAM(s) Oral two times a day  polyethylene glycol 3350 17 Gram(s) Oral daily  senna 2 Tablet(s) Oral at bedtime  simvastatin 40 milliGRAM(s) Oral at bedtime    MEDICATIONS  (PRN):  acetaminophen   Tablet 650 milliGRAM(s) Oral every 6 hours PRN For Temp greater than 38 C (100.4 F)  acetaminophen   Tablet. 650 milliGRAM(s) Oral every 6 hours PRN Moderate Pain (4 - 6)  dextrose Gel 1 Dose(s) Oral once PRN Blood Glucose LESS THAN 70 milliGRAM(s)/deciliter  glucagon  Injectable 1 milliGRAM(s) IntraMuscular once PRN Glucose LESS THAN 70 milligrams/deciliter  ondansetron Injectable 4 milliGRAM(s) IV Push every 6 hours PRN Nausea      Allergies    No Known Allergies    Intolerances        Vital Signs Last 24 Hrs  T(C): 36.5 (19 Mar 2018 05:55), Max: 36.8 (18 Mar 2018 11:08)  T(F): 97.7 (19 Mar 2018 05:55), Max: 98.3 (18 Mar 2018 11:08)  HR: 68 (19 Mar 2018 05:55) (67 - 84)  BP: 168/92 (19 Mar 2018 05:55) (144/80 - 168/92)  BP(mean): --  RR: 16 (19 Mar 2018 05:55) (16 - 18)  SpO2: 98% (19 Mar 2018 05:55) (96% - 98%)    PHYSICAL EXAM:    GENERAL: NAD, well-groomed, well-developed  HEAD:  Atraumatic, Normocephalic  EYES: EOMI, PERRLA, conjunctiva and sclera clear  ENMT: No tonsillar erythema, exudates, or enlargement; Moist mucous membranes, Good dentition, No lesions  NECK: Supple, No JVD, Normal thyroid  CHEST/LUNG: Clear to percussion bilaterally; No rales, rhonchi, wheezing, or rubs  HEART: Regular rate and rhythm; No murmurs, rubs, or gallops  ABDOMEN: Soft, Nontender, Nondistended; Bowel sounds present  EXTREMITIES:  2+ Peripheral Pulses, No clubbing, cyanosis, or edema  LYMPH: No lymphadenopathy noted  SKIN: No rashes or lesions      LABS:                        9.4    9.8   )-----------( 489      ( 19 Mar 2018 05:56 )             28.4     03-19    126<L>  |  86<L>  |  30.0<H>  ----------------------------<  193<H>  3.8   |  24.0  |  4.53<H>    Ca    8.5<L>      19 Mar 2018 05:56  Phos  4.2     03-19  Mg     2.0     03-19    Hepatitis ABC profiles negative.   Fe: 18;  % Sat:7;  [id number].   PT/INR - ( 19 Mar 2018 05:56 )   PT: 13.4 sec;   INR: 1.21 ratio         PTT - ( 19 Mar 2018 05:56 )  PTT:30.2 sec         RADIOLOGY & ADDITIONAL STUDIES:

## 2018-03-19 NOTE — DIETITIAN INITIAL EVALUATION ADULT. - PROBLEM SELECTOR PLAN 2
Pleuritic chest pain worsened after vomiting, reproducible to palpation on exam likely component of costochondritis in the setting of possible pneumonia  -Tylenol PRN pain  -EKG NSR with nonspecific T wave inversion  -troponin 0.16 x 1, will f/up repeat at 3am  -no tachycardia at this time  -recent outpatient stress test WNL in January 2018  -f/up cardio consult in AM

## 2018-03-19 NOTE — PROGRESS NOTE ADULT - SUBJECTIVE AND OBJECTIVE BOX
Patient discussed with Renal Attending Dr. Scott     Renal Team requesting placement of pericatheter     patient placed on schedule tomorrow for placement with Dr. Meyer.    Please , NPO at midnight    Call [phone] if there are any questions

## 2018-03-19 NOTE — PROGRESS NOTE ADULT - ASSESSMENT
MARSHALL on CKD 2/2 DM 1gm proteinuria on 24hr urine  Minimal UOP 250cc/ 24hrs, C3/C4 nL  Pleural effusions/pericardial effusions/ pericarditis-  HD x3 last HD yest 3/17  No signs of renal recovery  Will HD again kiara via femoral catheter  Discussed with vascular sx plans for PC likely on Wednesday    Need planing to be started for HD chair in the community  PPD hepatitis seroligies    Anemia low iron stores TS 7%- 3/13- cont IV iron  Cards/ GI- f/u noted MARSHALL on CKD 2/2 DM 1gm proteinuria on 24hr urine  Minimal UOP 250cc/ 24hrs, C3/C4 nL  Pleural effusions/pericardial effusions/ pericarditis-  HD x3 last HD yest 3/17  No signs of renal recovery  discussed dialysis with pt at bedside answered questions and explained process (In Malaysian)  Will HD again kiara via femoral catheter  Discussed with vascular sx plans for PC likely on Wednesday    Need planing to be started for HD chair in the community  PPD hepatitis serologies- ordered    Anemia low iron stores TS 7%- 3/13- cont IV iron  Cards/ GI- f/u noted

## 2018-03-19 NOTE — CHART NOTE - NSCHARTNOTEFT_GEN_A_CORE
Patient seen and case reviewed. She would be a candidate for Watchman device when medically cleared and if GERALD shows suitable anatomy. Will d/w Dr. Gissel Salazar.

## 2018-03-19 NOTE — DIETITIAN INITIAL EVALUATION ADULT. - PROBLEM SELECTOR PLAN 4
Acute lung infiltrate on CT with recent onset productive cough with white phlegm, tactile fever/chills concerning for CAP vs pulmonary edema  -2+ pitting edema on exam, no known history of CHF  -afebrile, no leukocytosis at this time  -f/up blood cultures x 2  -f/up sputum culture  -tylenol PRN fever, afebrile at this time

## 2018-03-19 NOTE — PROGRESS NOTE ADULT - PROBLEM SELECTOR PLAN 6
-bp stable, SBP peaks to the high 150's but patient has been receiving daily HD  -c/w clonidine and will up titrate as needed  -c/w hydralazine 25mg po q8hrs -bp stable, SBP peaks to the high 150's but patient has been receiving daily HD  -c/w clonidine and will up titrate as needed  -c/w hydralazine uptitrated to 25mg po q6hrs

## 2018-03-19 NOTE — PROGRESS NOTE ADULT - PROBLEM SELECTOR PLAN 3
likely multifactorial due to worsening acute on chronic renal insufficiency in the setting of CKD,  suspected GI bleed (initially with coffee ground emesis) but occult stool negative and degree of AOCD/ iron deficiency   -s/p 2 units pRBC on admission with improvement in h/h has remained stable   -last colonoscopy /EGD 2017 WNL as per patient  -occult stool neg  -will c/w monitoring h/h   -anemia panel noted with degree iron deficiency   -c/w ppi po bid   -c/w iron sucrose as per nephro and eventual change to ferrous sulfate   -pt could benefit from epogen   -GI f/u noted and appreciated and recommended conservative management at this time and leave on clear liquid diet when respiratory status improved will likely proceed with endoscopic intervention likely multifactorial due to worsening acute on chronic renal insufficiency in the setting of CKD,  suspected GI bleed (initially with coffee ground emesis) but occult stool negative and degree of AOCD/ iron deficiency   -s/p 2 units pRBC on admission with improvement in h/h has remained stable   -last colonoscopy /EGD 2017 WNL as per patient  -occult stool neg  -will c/w monitoring h/h   -anemia panel noted with degree iron deficiency   -c/w ppi po bid   -c/w iron sucrose as per nephro and eventual change to ferrous sulfate   -pt could benefit from epogen   -GI f/u noted and appreciated and recommended conservative management at this time and leave on clear liquid diet when respiratory status improved and after cardio clearance will likely proceed with endoscopic intervention - Will discontinue Diltiazem and will start Amiodarone drip with bridging to PO and metoprolol as per Cardiology recommendation.

## 2018-03-19 NOTE — DIETITIAN INITIAL EVALUATION ADULT. - DIET TYPE
fiber/residue restricted/renal replacement pts:no protein restr,no conc K & phos, low sodium/consistent carbohydrate (no snacks)/DASH/TLC (sodium and cholesterol restricted diet)

## 2018-03-19 NOTE — PROGRESS NOTE ADULT - ATTENDING COMMENTS
I have seen and examined the patient, reviewed the chart, labs and diagnostics and I agree with assessment and plan as above with resident team. note edited as above where required    elevated troponin; Initial planned for cardiac cath, but cancelled because of respiratory failure requiring 5 L NC   X ray chest Persistent B/L Pleural effusions. spoke with IR, plan for Diagnostic and therapeutic pleurocentesis in am, because of Persistent B/L effusions in spite of HD

## 2018-03-19 NOTE — DIETITIAN INITIAL EVALUATION ADULT. - PROBLEM SELECTOR PLAN 5
with worsening progression of chronic kidney disease stage V   -prerenal azotemia likely 2/2 dehydration with nausea/vomiting for 3 weeks  -BUN/Cr 69/5.79 worsened from baseline in Feb 2017 of 23/1.3   -f/up repeat UA  -strict I/O Q 6 hours  -f/up renal consult (Dr. Kevin)

## 2018-03-19 NOTE — PROGRESS NOTE ADULT - PROBLEM SELECTOR PLAN 5
-No further episodes of AFib  -no AC at this time until GI work up completed likely multifactorial due to worsening acute on chronic renal insufficiency in the setting of CKD,  suspected GI bleed (initially with coffee ground emesis) but occult stool negative and degree of AOCD/ iron deficiency   -s/p 2 units pRBC on admission with improvement in h/h has remained stable   -last colonoscopy /EGD 2017 WNL as per patient  -occult stool neg  -will c/w monitoring h/h   -anemia panel noted with degree iron deficiency   -c/w ppi po bid   -c/w iron sucrose as per nephro and eventual change to ferrous sulfate   -pt could benefit from epogen   -GI f/u noted and appreciated and recommended conservative management at this time and leave on clear liquid diet when respiratory status improved and after cardio clearance will likely proceed with endoscopic intervention

## 2018-03-19 NOTE — DIETITIAN INITIAL EVALUATION ADULT. - PROBLEM SELECTOR PLAN 1
due to worsening acute on chronic renal insuffieciency in the setting of CKD vs suspected GI bleed  -visible pallor and decreased cap refill on exam  -receiving 2 units pRBC  -last colonoscopy /EGD 2017 WNL as per patient  -f/up consult with Dr. Patton GI  -f/up AM CBC, CMP, Mag , Phos  -monitored bed +    -f/up stool guaiac  -serum ferritin 183, transferrin low 179 likely 2/2 anemia of chronic disease with component of iron deficiency with total iron low at 18, possible malnutrition component  -Protonix 40 IV daily for GI prophylaxis  -fall risk protocol, ambulate with assistance

## 2018-03-19 NOTE — PROGRESS NOTE ADULT - ASSESSMENT
Ms. Elizabeth is a 56 yo Ukrainian speaking female with hx of hypertension, hyperlipidemia, type 2 diabetes, with 1 month hx of vomiting and abdominal pain, with NSAID use.  Developed pleuritic chest pain, worsened in supine position and with deep breaths.  Also noted to be anemic.  1. tachypneic - worsening pleural effusions; IR guided bilateral thoracentesis, continued diuresis with HD.    1. abdominal pain/nausea/vomiting- being evaluated by GI, PPI, possible endoscopy  2. chest pain -  Her troponin is elevated, but CK is normal.  This is not consistent with acute coronary syndrome.  May have had demand ischemia in setting of blood loss anemia.  Probable underlying gastritis with concomitant pericarditis (pericardial rub, etc.).   Now on colchicine. Keep Hb > 9.  Monitor H& H.  Reviewed echo there are no regional wall motion abnormalities.  Very difficult to obtain a reliable history.  Given overall picture, elevated troponin, continued chest pain, needs ischemia evaluation.  Cath rescheduled when optimized from a pulmonary standpoint.   3. anemia - probably chronic or subacute .  GI workup.  CKD  Keep Hb > 9  4. hypertension   5. moderate pericardial effusion - no evidence for hemodynamic compromise; chronic in nature.  If continues to be tachypneic despite thoracentesis, recheck limited TTE to assess for effusion  6. acute on chronic renal insufficiency -   for HD today  8. paroxysmal atrial fibrillation - newly diagnosed; no AC for now given anemia, will need GI clearance; appreciate EP input regarding KELLI closure candidacy    Thank you for allowing me to participate in your patient's care.  Please call with questions.   D/W Dr. Hernandez and Dr. Godinez Ms. Elizabeth is a 58 yo French speaking female with hx of hypertension, hyperlipidemia, type 2 diabetes, with 1 month hx of vomiting and abdominal pain, with NSAID use.  Developed pleuritic chest pain, worsened in supine position and with deep breaths.  Also noted to be anemic.    1. tachypneic - worsening pleural effusions; IR guided bilateral thoracentesis, continued diuresis with HD.    2. abdominal pain/nausea/vomiting- being evaluated by GI, PPI, possible endoscopy  3. chest pain -  Her troponin is elevated, but CK is normal.  This is not consistent with acute coronary syndrome.  May have had demand ischemia in setting of blood loss anemia.  Probable underlying gastritis with concomitant pericarditis (pericardial rub, etc.).   Now on colchicine. Keep Hb > 9.  Monitor H& H.  Reviewed echo there are no regional wall motion abnormalities.  Very difficult to obtain a reliable history.  Given overall picture, elevated troponin, continued chest pain, needs ischemia evaluation.  Cath rescheduled when optimized from a pulmonary standpoint.   4. anemia - probably chronic or subacute .  GI workup.  CKD  Keep Hb > 9  5 hypertension- elevated, start carvedilol 12.5 mg bid, volume removal.  If able to make any urine, start IV lasix as well to augment volume removal.    5. moderate pericardial effusion - no evidence for hemodynamic compromise; chronic in nature.  If continues to be tachypneic despite thoracentesis, recheck limited TTE to assess for effusion  6. acute on chronic renal insufficiency -   HD per renal  8. paroxysmal atrial fibrillation - newly diagnosed; no AC for now given anemia, will need GI clearance; appreciate EP input regarding KELLI closure candidacy  9. ventricular arrhythmias on telemetry over the weekend - artifact, may d/c amiodarone    Thank you for allowing me to participate in your patient's care.  Please call with questions.   D/W Dr. Childs and Dr. Godinez Ms. Elizabeth is a 56 yo Urdu speaking female with hx of hypertension, hyperlipidemia, type 2 diabetes, with 1 month hx of vomiting and abdominal pain, with NSAID use.  Developed pleuritic chest pain, worsened in supine position and with deep breaths.  Also noted to be anemic.    1. tachypneic - worsening pleural effusions; IR guided bilateral thoracentesis, continued diuresis with HD.    2. abdominal pain/nausea/vomiting- being evaluated by GI, PPI, possible endoscopy  3. chest pain -  Her troponin is elevated, but CK is normal.  This is not consistent with acute coronary syndrome.  May have had demand ischemia in setting of blood loss anemia.  Probable underlying gastritis with concomitant pericarditis (pericardial rub, etc.).   Now on colchicine. Keep Hb > 9.  Monitor H& H.  Reviewed echo there are no regional wall motion abnormalities.  Very difficult to obtain a reliable history.  Given overall picture, elevated troponin, continued chest pain, needs ischemia evaluation.  Cath rescheduled when optimized from a pulmonary standpoint.   4. anemia - probably chronic or subacute .  GI workup.  CKD  Keep Hb > 9  5 hypertension- elevated, switch metoprolol to carvedilol 12.5 mg bid, volume removal.  If able to make any urine, start IV lasix as well to augment volume removal.    5. moderate pericardial effusion - no evidence for hemodynamic compromise; chronic in nature.  If continues to be tachypneic despite thoracentesis, recheck limited TTE to assess for effusion  6. acute on chronic renal insufficiency -   HD per renal  8. paroxysmal atrial fibrillation - newly diagnosed; no AC for now given anemia, will need GI clearance; appreciate EP input regarding KELLI closure candidacy  9. ventricular arrhythmias on telemetry over the weekend - artifact, may d/c amiodarone    Thank you for allowing me to participate in your patient's care.  Please call with questions.   D/W Dr. Childs and Dr. Godinez

## 2018-03-20 ENCOUNTER — RESULT REVIEW (OUTPATIENT)
Age: 58
End: 2018-03-20

## 2018-03-20 LAB
ALBUMIN SERPL ELPH-MCNC: 2.1 G/DL — LOW (ref 3.3–5.2)
ALP SERPL-CCNC: 121 U/L — HIGH (ref 40–120)
ALT FLD-CCNC: 39 U/L — HIGH
ANION GAP SERPL CALC-SCNC: 15 MMOL/L — SIGNIFICANT CHANGE UP (ref 5–17)
AST SERPL-CCNC: 62 U/L — HIGH
B PERT IGG+IGM PNL SER: CLEAR — SIGNIFICANT CHANGE UP
BASOPHILS # BLD AUTO: 0 K/UL — SIGNIFICANT CHANGE UP (ref 0–0.2)
BASOPHILS NFR BLD AUTO: 0.5 % — SIGNIFICANT CHANGE UP (ref 0–2)
BILIRUB SERPL-MCNC: 0.3 MG/DL — LOW (ref 0.4–2)
BUN SERPL-MCNC: 38 MG/DL — HIGH (ref 8–20)
CALCIUM SERPL-MCNC: 8.2 MG/DL — LOW (ref 8.4–10.5)
CALCIUM SERPL-MCNC: 8.2 MG/DL — LOW (ref 8.6–10.2)
CHLORIDE SERPL-SCNC: 87 MMOL/L — LOW (ref 98–107)
CO2 SERPL-SCNC: 22 MMOL/L — SIGNIFICANT CHANGE UP (ref 22–29)
COLOR FLD: YELLOW
CREAT SERPL-MCNC: 5.33 MG/DL — HIGH (ref 0.5–1.3)
EOSINOPHIL # BLD AUTO: 0.2 K/UL — SIGNIFICANT CHANGE UP (ref 0–0.5)
EOSINOPHIL NFR BLD AUTO: 2 % — SIGNIFICANT CHANGE UP (ref 0–6)
FLUID INTAKE SUBSTANCE CLASS: SIGNIFICANT CHANGE UP
FLUID SEGMENTED GRANULOCYTES: 9 % — SIGNIFICANT CHANGE UP
GLUCOSE BLDC GLUCOMTR-MCNC: 160 MG/DL — HIGH (ref 70–99)
GLUCOSE BLDC GLUCOMTR-MCNC: 170 MG/DL — HIGH (ref 70–99)
GLUCOSE BLDC GLUCOMTR-MCNC: 174 MG/DL — HIGH (ref 70–99)
GLUCOSE BLDC GLUCOMTR-MCNC: 176 MG/DL — HIGH (ref 70–99)
GLUCOSE SERPL-MCNC: 161 MG/DL — HIGH (ref 70–115)
GRAM STN FLD: SIGNIFICANT CHANGE UP
HAV IGM SER-ACNC: SIGNIFICANT CHANGE UP
HBV CORE IGM SER-ACNC: SIGNIFICANT CHANGE UP
HBV SURFACE AG SER-ACNC: SIGNIFICANT CHANGE UP
HCT VFR BLD CALC: 28.5 % — LOW (ref 37–47)
HCV AB S/CO SERPL IA: 0.13 S/CO — SIGNIFICANT CHANGE UP
HCV AB SERPL-IMP: SIGNIFICANT CHANGE UP
HGB BLD-MCNC: 9.3 G/DL — LOW (ref 12–16)
LYMPHOCYTES # BLD AUTO: 0.6 K/UL — LOW (ref 1–4.8)
LYMPHOCYTES # BLD AUTO: 7.1 % — LOW (ref 20–55)
LYMPHOCYTES # FLD: 39 % — SIGNIFICANT CHANGE UP
MCHC RBC-ENTMCNC: 28.5 PG — SIGNIFICANT CHANGE UP (ref 27–31)
MCHC RBC-ENTMCNC: 32.6 G/DL — SIGNIFICANT CHANGE UP (ref 32–36)
MCV RBC AUTO: 87.4 FL — SIGNIFICANT CHANGE UP (ref 81–99)
MESOTHL CELL # FLD: 8 % — SIGNIFICANT CHANGE UP
MONOCYTES # BLD AUTO: 0.8 K/UL — SIGNIFICANT CHANGE UP (ref 0–0.8)
MONOCYTES NFR BLD AUTO: 9.8 % — SIGNIFICANT CHANGE UP (ref 3–10)
MONOS+MACROS # FLD: 44 % — SIGNIFICANT CHANGE UP
MYELOPEROXIDASE AB SER-ACNC: 79.1 UNITS — HIGH
MYELOPEROXIDASE CELLS FLD QL: POSITIVE
NEUTROPHILS # BLD AUTO: 6.7 K/UL — SIGNIFICANT CHANGE UP (ref 1.8–8)
NEUTROPHILS NFR BLD AUTO: 80.1 % — HIGH (ref 37–73)
PH FLD: 8 — SIGNIFICANT CHANGE UP
PHOSPHATE SERPL-MCNC: 4.8 MG/DL — HIGH (ref 2.4–4.7)
PLATELET # BLD AUTO: 607 K/UL — HIGH (ref 150–400)
POTASSIUM SERPL-MCNC: 4.4 MMOL/L — SIGNIFICANT CHANGE UP (ref 3.5–5.3)
POTASSIUM SERPL-SCNC: 4.4 MMOL/L — SIGNIFICANT CHANGE UP (ref 3.5–5.3)
PROT SERPL-MCNC: 6 G/DL — LOW (ref 6.6–8.7)
PROTEINASE3 AB FLD-ACNC: <5 UNITS — SIGNIFICANT CHANGE UP
PROTEINASE3 AB SER-ACNC: NEGATIVE — SIGNIFICANT CHANGE UP
RBC # BLD: 3.26 M/UL — LOW (ref 4.4–5.2)
RBC # FLD: 13.4 % — SIGNIFICANT CHANGE UP (ref 11–15.6)
RCV VOL RI: 110 /UL — HIGH (ref 0–5)
SODIUM SERPL-SCNC: 124 MMOL/L — LOW (ref 135–145)
SPECIMEN SOURCE: SIGNIFICANT CHANGE UP
TOTAL NUCLEATED CELL COUNT, BODY FLUID: 62 /UL — HIGH (ref 0–5)
TUBE TYPE: SIGNIFICANT CHANGE UP
WBC # BLD: 8.4 K/UL — SIGNIFICANT CHANGE UP (ref 4.8–10.8)
WBC # FLD AUTO: 8.4 K/UL — SIGNIFICANT CHANGE UP (ref 4.8–10.8)

## 2018-03-20 PROCEDURE — 88112 CYTOPATH CELL ENHANCE TECH: CPT | Mod: 26

## 2018-03-20 PROCEDURE — 32555 ASPIRATE PLEURA W/ IMAGING: CPT | Mod: RT

## 2018-03-20 PROCEDURE — 99233 SBSQ HOSP IP/OBS HIGH 50: CPT | Mod: GC

## 2018-03-20 PROCEDURE — 99233 SBSQ HOSP IP/OBS HIGH 50: CPT

## 2018-03-20 PROCEDURE — 71046 X-RAY EXAM CHEST 2 VIEWS: CPT | Mod: 26,59

## 2018-03-20 PROCEDURE — 71045 X-RAY EXAM CHEST 1 VIEW: CPT | Mod: 26,76

## 2018-03-20 PROCEDURE — 88305 TISSUE EXAM BY PATHOLOGIST: CPT | Mod: 26

## 2018-03-20 RX ORDER — SORBITOL SOLUTION 70 %
20 SOLUTION, ORAL MISCELLANEOUS ONCE
Qty: 0 | Refills: 0 | Status: COMPLETED | OUTPATIENT
Start: 2018-03-20 | End: 2018-03-20

## 2018-03-20 RX ORDER — CARVEDILOL PHOSPHATE 80 MG/1
12.5 CAPSULE, EXTENDED RELEASE ORAL EVERY 12 HOURS
Qty: 0 | Refills: 0 | Status: DISCONTINUED | OUTPATIENT
Start: 2018-03-20 | End: 2018-03-28

## 2018-03-20 RX ORDER — FUROSEMIDE 40 MG
20 TABLET ORAL DAILY
Qty: 0 | Refills: 0 | Status: DISCONTINUED | OUTPATIENT
Start: 2018-03-20 | End: 2018-03-24

## 2018-03-20 RX ORDER — ACETAMINOPHEN 500 MG
1000 TABLET ORAL ONCE
Qty: 0 | Refills: 0 | Status: DISCONTINUED | OUTPATIENT
Start: 2018-03-20 | End: 2018-03-20

## 2018-03-20 RX ADMIN — ONDANSETRON 4 MILLIGRAM(S): 8 TABLET, FILM COATED ORAL at 22:42

## 2018-03-20 RX ADMIN — Medication 1 DROP(S): at 17:19

## 2018-03-20 RX ADMIN — Medication 1: at 17:19

## 2018-03-20 RX ADMIN — Medication 1: at 09:07

## 2018-03-20 RX ADMIN — Medication 100 MILLIGRAM(S): at 05:47

## 2018-03-20 RX ADMIN — SIMVASTATIN 40 MILLIGRAM(S): 20 TABLET, FILM COATED ORAL at 22:36

## 2018-03-20 RX ADMIN — AMIODARONE HYDROCHLORIDE 400 MILLIGRAM(S): 400 TABLET ORAL at 05:47

## 2018-03-20 RX ADMIN — Medication 10 MILLIGRAM(S): at 05:47

## 2018-03-20 RX ADMIN — PANTOPRAZOLE SODIUM 40 MILLIGRAM(S): 20 TABLET, DELAYED RELEASE ORAL at 17:18

## 2018-03-20 RX ADMIN — Medication 25 MILLIGRAM(S): at 22:36

## 2018-03-20 RX ADMIN — Medication 0.6 MILLIGRAM(S): at 17:27

## 2018-03-20 RX ADMIN — Medication 20 MILLILITER(S): at 17:19

## 2018-03-20 RX ADMIN — Medication 0.6 MILLIGRAM(S): at 05:47

## 2018-03-20 RX ADMIN — Medication 25 MILLIGRAM(S): at 05:47

## 2018-03-20 RX ADMIN — Medication 650 MILLIGRAM(S): at 16:04

## 2018-03-20 RX ADMIN — Medication 100 MILLIGRAM(S): at 17:18

## 2018-03-20 RX ADMIN — Medication 20 MILLIGRAM(S): at 17:52

## 2018-03-20 RX ADMIN — Medication 650 MILLIGRAM(S): at 17:08

## 2018-03-20 RX ADMIN — Medication 1 DROP(S): at 05:48

## 2018-03-20 RX ADMIN — SENNA PLUS 2 TABLET(S): 8.6 TABLET ORAL at 22:36

## 2018-03-20 RX ADMIN — Medication 25 MILLIGRAM(S): at 15:04

## 2018-03-20 RX ADMIN — Medication 10 MILLIGRAM(S): at 17:19

## 2018-03-20 RX ADMIN — Medication 1: at 12:50

## 2018-03-20 RX ADMIN — Medication 81 MILLIGRAM(S): at 16:04

## 2018-03-20 RX ADMIN — Medication 0.1 MILLIGRAM(S): at 05:47

## 2018-03-20 RX ADMIN — PANTOPRAZOLE SODIUM 40 MILLIGRAM(S): 20 TABLET, DELAYED RELEASE ORAL at 05:47

## 2018-03-20 RX ADMIN — CARVEDILOL PHOSPHATE 12.5 MILLIGRAM(S): 80 CAPSULE, EXTENDED RELEASE ORAL at 17:17

## 2018-03-20 RX ADMIN — ONDANSETRON 4 MILLIGRAM(S): 8 TABLET, FILM COATED ORAL at 06:21

## 2018-03-20 RX ADMIN — Medication 0.1 MILLIGRAM(S): at 17:17

## 2018-03-20 NOTE — PROGRESS NOTE ADULT - PROBLEM SELECTOR PLAN 2
baseline ckd 3 now in ARF with pulm edema/ uremia sx/ uremic pericarditis requiring dialysis     -pt still clinically sob, tachypneic   -bun/cr noted and continues to be trended   - c/w strict I/O      -daily weight   -Frequency of dialysis according to nephrology recommendations, likely to require it in the long term, will need seat in the community.  -hyponatremia is secondary to worsening renal function   -avoid nephrotoxic medications   -renal US noted with chronic medical renal disease no evidence of hydronephrosis   -Acute hepatitis panel negative, Hepb non immune      -ppd at 48 hours 0mm negative thus far   -vascular consult noted and appreciated for femoral access.

## 2018-03-20 NOTE — PROGRESS NOTE ADULT - SUBJECTIVE AND OBJECTIVE BOX
Interventional Radiology Brief- Operative Note    Procedure: US guided Right thoracentesis    Operators: MOLLY Basurto MD    Anesthesia (type): local    Contrast: None    EBL: 0    Findings/Follow up Plan of Care: 1100 mL of clear yellow fluid aspirated; samples sent for analysis.    Specimens Removed: As above    Implants: None    Complications: None    Condition/Disposition: Stable    Please call Interventional Radiology with any questions, concerns, or issues.      Official report to follow.

## 2018-03-20 NOTE — PROGRESS NOTE ADULT - ASSESSMENT
MARSHALL on CKD 2/2 DM 1gm proteinuria on 24hr urine likely from DM  Minimal UOP 250cc/ 24hrs, C3/C4 nL  Pleural effusions/pericardial effusions/ pericarditis-  HD x4 last HD today Next HD on Thursday  No signs of renal recovery  discussed dialysis with pt at bedside answered questions and explained process (In Sami)  HD today via femoral catheter  Discussed with vascular sx plans for PC tommorow should remove femoral cath     Need planing to be started for HD chair in the community  PPD hepatitis serologies- ordered    Anemia low iron stores TS 7%- 3/13- cont IV iron  Cards/ GI- f/u noted

## 2018-03-20 NOTE — PROGRESS NOTE ADULT - SUBJECTIVE AND OBJECTIVE BOX
NEPHROLOGY INTERVAL HPI/OVERNIGHT EVENTS:    Examined earlier  HD today    MEDICATIONS  (STANDING):  amiodarone    Tablet 400 milliGRAM(s) Oral every 12 hours  amiodarone Infusion 1 mG/Min (33.333 mL/Hr) IV Continuous <Continuous>  amiodarone Infusion 0.5 mG/Min (16.667 mL/Hr) IV Continuous <Continuous>  artificial  tears Solution 1 Drop(s) Both EYES every 12 hours  aspirin enteric coated 81 milliGRAM(s) Oral daily  cloNIDine 0.1 milliGRAM(s) Oral two times a day  colchicine 0.6 milliGRAM(s) Oral two times a day  dextrose 5%. 1000 milliLiter(s) (50 mL/Hr) IV Continuous <Continuous>  dextrose 50% Injectable 12.5 Gram(s) IV Push once  dextrose 50% Injectable 25 Gram(s) IV Push once  dextrose 50% Injectable 25 Gram(s) IV Push once  docusate sodium 100 milliGRAM(s) Oral two times a day  hydrALAZINE 25 milliGRAM(s) Oral every 8 hours  insulin lispro (HumaLOG) corrective regimen sliding scale   SubCutaneous three times a day before meals  insulin lispro (HumaLOG) corrective regimen sliding scale   SubCutaneous at bedtime  metoclopramide Injectable 10 milliGRAM(s) IV Push every 12 hours  metoprolol     tartrate 25 milliGRAM(s) Oral every 12 hours  pantoprazole    Tablet 40 milliGRAM(s) Oral two times a day  polyethylene glycol 3350 17 Gram(s) Oral daily  PPD  5 Tuberculin Unit(s) Injectable 5 Unit(s) IntraDermal once  senna 2 Tablet(s) Oral at bedtime  simvastatin 40 milliGRAM(s) Oral at bedtime    MEDICATIONS  (PRN):  acetaminophen   Tablet 650 milliGRAM(s) Oral every 6 hours PRN For Temp greater than 38 C (100.4 F)  acetaminophen   Tablet. 650 milliGRAM(s) Oral every 6 hours PRN Moderate Pain (4 - 6)  dextrose Gel 1 Dose(s) Oral once PRN Blood Glucose LESS THAN 70 milliGRAM(s)/deciliter  glucagon  Injectable 1 milliGRAM(s) IntraMuscular once PRN Glucose LESS THAN 70 milligrams/deciliter  hydrALAZINE Injectable 20 milliGRAM(s) IV Push every 6 hours PRN SBP > 160  ondansetron Injectable 4 milliGRAM(s) IV Push every 6 hours PRN Nausea      Allergies    No Known Allergies    Intolerances        Vital Signs Last 24 Hrs  T(C): 36.6 (20 Mar 2018 11:50), Max: 37.1 (20 Mar 2018 05:43)  T(F): 97.8 (20 Mar 2018 11:50), Max: 98.7 (20 Mar 2018 05:43)  HR: 75 (20 Mar 2018 11:50) (70 - 100)  BP: 154/82 (20 Mar 2018 11:50) (134/88 - 174/96)  BP(mean): --  RR: 18 (20 Mar 2018 11:50) (16 - 20)  SpO2: 94% (20 Mar 2018 11:50) (94% - 99%)  Daily     Daily Weight in k.6 (20 Mar 2018 06:42)    PHYSICAL EXAM:  GENERAL: appears chronically ill  HEAD: NCAT  EYES: EOMI  NECK: supple no JVD  NERVOUS SYSTEM:  A&O x 3 primarily Telugu speaking  CHEST/LUNG: decreased bs bases  HEART: + rub +S1S2  ABDOMEN: soft  EXTREMITIES: trace LE edema    LABS:                        9.3    8.4   )-----------( 607      ( 20 Mar 2018 13:09 )             28.5     03-20    124<L>  |  87<L>  |  38.0<H>  ----------------------------<  161<H>  4.4   |  22.0  |  5.33<H>    Ca    8.2<L>      20 Mar 2018 13:09  Phos  4.8     20  Mg     2.0         TPro  6.0<L>  /  Alb  2.1<L>  /  TBili  0.3<L>  /  DBili  x   /  AST  62<H>  /  ALT  39<H>  /  AlkPhos  121<H>  03-20    PT/INR - ( 19 Mar 2018 05:56 )   PT: 13.4 sec;   INR: 1.21 ratio         PTT - ( 19 Mar 2018 05:56 )  PTT:30.2 sec    Phosphorus Level, Serum: 4.8 mg/dL ( @ 05:58)          RADIOLOGY & ADDITIONAL TESTS:

## 2018-03-20 NOTE — PROGRESS NOTE ADULT - PROBLEM SELECTOR PLAN 1
1.6 liters fluid taken off HD on 3/18;   next due hd on 3/20  Spoke with IR attending, agrees for Pleurocentesis today    -patient npo after midnight;   Bilateral pleural effusions and pericardial effusion, noted tte with preserved EF, likely due to worsening renal failure but unclear if component of HFPEF. Now improving clincally after HD sessions, worsening pleural effusions on CXR,  -TTE shows preserved EF and moderate pericardial effusion   -c/w supplemental o2 as needed   -c/w colchicine 0.6mg po bid for presumed pericarditis   -Hemodialysis as per nephrology recommendations.

## 2018-03-20 NOTE — PROGRESS NOTE ADULT - ASSESSMENT
Assessment and Plan:   · Assessment		  Ms. Elizabeth is a 56 yo Kazakh speaking female with hx of hypertension, hyperlipidemia, type 2 diabetes, with 1 month hx of vomiting and abdominal pain, with NSAID use.  Developed pleuritic chest pain, worsened in supine position and with deep breaths.  Chief complaint of N/V and dark stools noted to be in acute pulm edema/pericardial effusion with ARF, underwent US guided Right thoracentesis, mild discomfort noted, seen by Team and ordered Tylenol.  Also complained of no BM x 3 days, added sorbitol and informed PCP to follow.   Also cardiac cath cancelled yesterday due to respiratory distress.  NO EDG until cleared, GI following.        1. tachypneic - worsening pleural effusions; IR guided bilateral thoracentesis, removed 1100ml today, resolved, follow up cxr in am.   2. abdominal pain/nausea/vomiting- being evaluated by GI, PPI, possible endoscopy when cleared after pending cardiac catheretization.   3. chest pain -  Her troponin is elevated, but CK is normal.  This is not consistent with acute coronary syndrome.  May have had demand ischemia in setting of blood loss anemia.  Probable underlying gastritis with concomitant pericarditis (pericardial rub, etc.).   Now on colchicine. Keep Hb > 9.  Monitor H& H.  Today 9.3.  Given overall picture, elevated troponin, continued chest pain, needs ischemia evaluation.  Cath rescheduled when optimized from a pulmonary standpoint.   4. anemia - probably chronic or subacute .  GI workup.  CKD  Keep Hb > 9  5 hypertension- elevated, metoprolol to carvedilol 12.5 mg bid, volume removal start with lasix 20 ml.  Ct with hydralazine and clonidine.   550 ml urine output this afternoon      5. moderate pericardial effusion - Follow up cxr in am.    6. acute on chronic renal insufficiency -   HD per renal  8. paroxysmal atrial fibrillation - newly diagnosed; no AC for now given anemia, will need GI clearance; appreciate EP input regarding KELLI closure candidacy, consult pending.    9. ventricular arrhythmias on telemetry over the weekend - artifact, SR on monitor and further ectopy noted.   D/c amiodarone.   10. constipation-added sorbitol, contineu with senna and colace. PCP team aware. Assessment and Plan:   · Assessment		  Ms. Elizabeth is a 58 yo Slovenian speaking female with hx of hypertension, hyperlipidemia, type 2 diabetes, with 1 month hx of vomiting and abdominal pain, with NSAID use.  Developed pleuritic chest pain, worsened in supine position and with deep breaths.  Chief complaint of N/V and dark stools noted to be in acute pulm edema/pericardial effusion with ARF, underwent US guided Right thoracentesis, mild discomfort noted, seen by Team and ordered Tylenol.  Also complained of no BM x 3 days, added sorbitol and informed PCP to follow.   Also cardiac cath cancelled yesterday due to respiratory distress.  NO EDG until cleared, GI following.        1. tachypneic - worsening pleural effusions; IR guided thoracentesis on the right, removed 1100ml today; L sided thoracentesis after permacath?  2. abdominal pain/nausea/vomiting- being evaluated by GI, PPI, awaiting endoscopy when stable from cardiopulmonary standpoint.   3. chest pain -  Her troponin is elevated, but CK is normal.  This is not consistent with acute coronary syndrome.  May have had demand ischemia in setting of blood loss anemia.  Probable underlying gastritis with concomitant pericarditis (pericardial rub, etc.).   Now on colchicine. Keep Hb > 9.  Monitor H& H.  Today 9.3.  Given overall picture, elevated troponin, continued chest pain, needs ischemia evaluation.  Cath rescheduled when optimized from a pulmonary standpoint.   4. anemia - probably chronic or subacute .  GI workup.  CKD  Keep Hb > 9  5 hypertension- elevated, metoprolol to carvedilol 12.5 mg bid, volume removal start with lasix 20 mg daily.  Ct with hydralazine and clonidine.   550 ml urine output this afternoon      5. moderate pericardial effusion - hemodynamically stable, chronic.  6. acute on chronic renal insufficiency -   HD per renal, for permacath tomorrow.  8. paroxysmal atrial fibrillation - newly diagnosed; no AC for now given anemia, will need GI clearance; appreciate EP input regarding KELLI closure candidacy. Will perform GERALD prior to discharge to assess KELLI anatomy and if suitable for KELLI closure.  9. ventricular arrhythmias on telemetry over the weekend - artifact, SR on monitor and further ectopy noted.   D/c amiodarone.   10. constipation-added sorbitol, continue with senna and colace. PCP team aware.     Thank you for allowing me to participate in care of your patient.   Will follow

## 2018-03-20 NOTE — PROGRESS NOTE ADULT - SUBJECTIVE AND OBJECTIVE BOX
Right femoral shiley catheter removed earlier    digital pressure applied for 15 minuted total    Patient tolerated procedure     - Patient instructed to bed rest for 2 hours  - Notify Vascular PA is bleeding encountered

## 2018-03-20 NOTE — PROGRESS NOTE ADULT - SUBJECTIVE AND OBJECTIVE BOX
[place] [facility]                                                        Office: [address] [address]                                                       Telephone: [phone]. Fax:[phone]                                                                             PROGRESS NOTE   Reason for follow up:                               Overnight: No new events.   Update:   57y old  Female who presents with a chief complaint of N/V and dark stools noted to be in acute pulm edema/pericardial effusion with ARF, underwent US guided Right thoracentesis, mild discomfort noted, seen by Team and ordered Tylenol.  Also complained of no BM x 3 days, added sorbitol and informed PCP to follow.   Also cardiac cath cancelled yesterday due to respiratory distress.  NO EDG until cleared, GI following.      Subjective:    "I have not had a BM x 3 days.    Complains of:    Chest discomfort   Review of symptoms: Cardiac:  No chest pain. No dyspnea. No palpitations.  Respiratory: no cough. No dyspnea  Gastrointestinal: No diarrhea. No abdominal pain. No bleeding.     Past medical history: No updates.   Chronic conditions:  Hypertension: controlled. CHF: Stable. CAD: Stable ischemic heart disease. DM: Stable;    	  Vitals:  T(C): 37 (03-20-18 @ 15:00), Max: 37.1 (03-20-18 @ 05:43)  HR: 72 (03-20-18 @ 15:00) (70 - 80)  BP: 151/85 (03-20-18 @ 15:00) (134/88 - 174/96)  RR: 18 (03-20-18 @ 15:00) (16 - 20)  SpO2: 95% (03-20-18 @ 15:00) (94% - 99%)  I&O's Summary  20 Mar 2018 07:01  -  20 Mar 2018 16:28  --------------------------------------------------------  IN: 0 mL / OUT: 1000 mL / NET: -1000 mL    PHYSICAL EXAM:  Appearance: Comfortable. No acute distress,  provided,   HEENT:  Head and neck: Atraumatic. Normocephalic.  Normal oral mucosa, PERRL, Neck is supple. No JVD, No carotid bruit.   Neurologic: A & O x 3, no focal deficits. EOMI , Cranial nerves are intact.  Lymphatic: No cervical lymphadenopathy  Cardiovascular: Normal S1 S2, No murmur,+rubs on righrt chest wall anterior and posterior.  NO JVD, No edema  Respiratory: Lungs clear to auscultation. + rub posterior on expirations    Gastrointestinal:  Soft, Non-tender, + BS  Lower Extremities: No edema. +PP  Psychiatry: Patient is calm. No agitation. Mood & affect appropriate  Skin: No rashes/ ecchymoses/cyanosis/ulcers visualized on the face, hands or feet.    CURRENT MEDICATIONS:  amiodarone    Tablet 400 milliGRAM(s) Oral every 12 hours  amiodarone Infusion 1 mG/Min IV Continuous <Continuous>  amiodarone Infusion 0.5 mG/Min IV Continuous <Continuous>  cloNIDine 0.1 milliGRAM(s) Oral two times a day  hydrALAZINE 25 milliGRAM(s) Oral every 8 hours  hydrALAZINE Injectable 20 milliGRAM(s) IV Push every 6 hours PRN  metoprolol     tartrate 25 milliGRAM(s) Oral every 12 hours  acetaminophen  IVPB.  metoclopramide Injectable  docusate sodium  pantoprazole    Tablet  polyethylene glycol 3350  senna  sorbitol 70% Solution  colchicine  dextrose 50% Injectable  dextrose 50% Injectable  dextrose 50% Injectable  insulin lispro (HumaLOG) corrective regimen sliding scale  insulin lispro (HumaLOG) corrective regimen sliding scale  simvastatin  artificial  tears Solution  aspirin enteric coated  dextrose 5%.  PPD  5 Tuberculin Unit(s) Injectable    LABS:	 	  CARDIAC MARKERS ( 18 Mar 2018 12:44 )  x     / 0.17 ng/mL / x     / x     / x      p-BNP 18 Mar 2018 12:44: x                           9.3    8.4   )-----------( 607      ( 20 Mar 2018 13:09 )             28.5     124<L>  |  87<L>  |  38.0<H>  ----------------------------<  161<H>  4.4   |  22.0  |  5.33<H>  Ca    8.2<L>      20 Mar 2018 13:09  Phos  4.8     03-20  Mg     2.0     03-19  TPro  6.0<L>  /  Alb  2.1<L>  /  TBili  0.3<L>  /  DBili  x   /  AST  62<H>  /  ALT  39<H>  /  AlkPhos  121<H>  03-20  Lipid Profile: Date: 03-14 @ 11:12  Total cholesterol 214; Direct LDL: 128; HDL: 63; Triglycerides:114  HgA1c: Hemoglobin A1C, Whole Blood: 7.5 %    TELEMETRY: Reviewed, SR, no overnight concerns. [place] [facility]                                                        Office: [address] [address]                                                       Telephone: [phone]. Fax:[phone]                                                                             PROGRESS NOTE   Reason for follow up:                               Overnight: No new events.   Update:   57y old  Female who presents with a chief complaint of N/V and dark stools noted to be in acute pulm edema/pericardial effusion with ARF, underwent US guided Right thoracentesis, mild discomfort noted, seen by Team and ordered Tylenol.  Also complained of no BM x 3 days, added sorbitol and informed PCP to follow.   Cardiac cath cancelled yesterday due to respiratory distress.     Subjective:    "I have not had a BM x 3 days.    Complains of:    Chest discomfort   Review of symptoms: Cardiac:  No chest pain. No dyspnea. No palpitations.  Respiratory: no cough. No dyspnea  Gastrointestinal: No diarrhea. No abdominal pain. No bleeding.     Past medical history: No updates.   Chronic conditions:  Hypertension: controlled. CHF: Stable. CAD: Stable ischemic heart disease. DM: Stable;    	  Vitals:  T(C): 37 (03-20-18 @ 15:00), Max: 37.1 (03-20-18 @ 05:43)  HR: 72 (03-20-18 @ 15:00) (70 - 80)  BP: 151/85 (03-20-18 @ 15:00) (134/88 - 174/96)  RR: 18 (03-20-18 @ 15:00) (16 - 20)  SpO2: 95% (03-20-18 @ 15:00) (94% - 99%)  I&O's Summary  20 Mar 2018 07:01  -  20 Mar 2018 16:28  --------------------------------------------------------  IN: 0 mL / OUT: 1000 mL / NET: -1000 mL    PHYSICAL EXAM:  Appearance: Comfortable. No acute distress,  provided,   HEENT:  Head and neck: Atraumatic. Normocephalic.  Normal oral mucosa, PERRL, Neck is supple. No JVD, No carotid bruit.   Neurologic: A & O x 3, no focal deficits. EOMI , Cranial nerves are intact.  Lymphatic: No cervical lymphadenopathy  Cardiovascular: Normal S1 S2, No murmur,+rubs on righrt chest wall anterior and posterior.  NO JVD, No edema  Respiratory: Lungs clear to auscultation. + rub posterior on expirations    Gastrointestinal:  Soft, Non-tender, + BS  Lower Extremities: No edema. +PP  Psychiatry: Patient is calm. No agitation. Mood & affect appropriate  Skin: No rashes/ ecchymoses/cyanosis/ulcers visualized on the face, hands or feet.    CURRENT MEDICATIONS:  amiodarone    Tablet 400 milliGRAM(s) Oral every 12 hours  amiodarone Infusion 1 mG/Min IV Continuous <Continuous>  amiodarone Infusion 0.5 mG/Min IV Continuous <Continuous>  cloNIDine 0.1 milliGRAM(s) Oral two times a day  hydrALAZINE 25 milliGRAM(s) Oral every 8 hours  hydrALAZINE Injectable 20 milliGRAM(s) IV Push every 6 hours PRN  metoprolol     tartrate 25 milliGRAM(s) Oral every 12 hours  acetaminophen  IVPB.  metoclopramide Injectable  docusate sodium  pantoprazole    Tablet  polyethylene glycol 3350  senna  sorbitol 70% Solution  colchicine  dextrose 50% Injectable  dextrose 50% Injectable  dextrose 50% Injectable  insulin lispro (HumaLOG) corrective regimen sliding scale  insulin lispro (HumaLOG) corrective regimen sliding scale  simvastatin  artificial  tears Solution  aspirin enteric coated  dextrose 5%.  PPD  5 Tuberculin Unit(s) Injectable    LABS:	 	  CARDIAC MARKERS ( 18 Mar 2018 12:44 )  x     / 0.17 ng/mL / x     / x     / x      p-BNP 18 Mar 2018 12:44: x                           9.3    8.4   )-----------( 607      ( 20 Mar 2018 13:09 )             28.5     124<L>  |  87<L>  |  38.0<H>  ----------------------------<  161<H>  4.4   |  22.0  |  5.33<H>  Ca    8.2<L>      20 Mar 2018 13:09  Phos  4.8     03-20  Mg     2.0     03-19  TPro  6.0<L>  /  Alb  2.1<L>  /  TBili  0.3<L>  /  DBili  x   /  AST  62<H>  /  ALT  39<H>  /  AlkPhos  121<H>  03-20  Lipid Profile: Date: 03-14 @ 11:12  Total cholesterol 214; Direct LDL: 128; HDL: 63; Triglycerides:114  HgA1c: Hemoglobin A1C, Whole Blood: 7.5 %    TELEMETRY: Reviewed, SR, no overnight concerns.

## 2018-03-20 NOTE — PROGRESS NOTE ADULT - PROBLEM SELECTOR PLAN 5
likely multifactorial due to worsening acute on chronic renal insufficiency in the setting of CKD,  suspected GI bleed (initially with coffee ground emesis) but occult stool negative and degree of AOCD/ iron deficiency   -s/p 2 units pRBC on admission with improvement in h/h has remained stable   -last colonoscopy /EGD 2017 WNL as per patient  -occult stool neg  -will c/w monitoring h/h   -anemia panel noted with degree iron deficiency   -c/w ppi po bid   -c/w iron sucrose as per nephro and eventual change to ferrous sulfate   -pt could benefit from epogen   -GI f/u noted and appreciated and recommended conservative management at this time and leave on clear liquid diet when respiratory status improved and after cardio clearance will likely proceed with endoscopic intervention

## 2018-03-20 NOTE — PROGRESS NOTE ADULT - SUBJECTIVE AND OBJECTIVE BOX
Patient is a 57y old  Female who presents with a chief complaint of N/V and dark stools noted to be in acute pulm edema/pericardial effusion with ARF requiring HD (14 Mar 2018 02:54)    INTERVAL HPI/OVERNIGHT EVENTS:  Patient seen and examined at bedside. No acute events overnight.  Patient has mild improvement of her sob, she has nausea overnight.     She continues to be on 5 liters of oxygen overnight.     Denies any abdominal/epigastric pain this morning, leg swelling, feeling of heart racing.      Vital Signs Last 24 Hrs  T(C): 37.1 (20 Mar 2018 05:43), Max: 37.1 (20 Mar 2018 05:43)  T(F): 98.7 (20 Mar 2018 05:43), Max: 98.7 (20 Mar 2018 05:43)  HR: 70 (20 Mar 2018 05:43) (63 - 100)  BP: 164/92 (20 Mar 2018 05:43) (134/88 - 179/93)   Hypertensive  RR: 16 (20 Mar 2018 05:43) (16 - 20)  SpO2: 99% (20 Mar 2018 05:43) (97% - 99%)    Telemonitor: Sinus rhythm    PHYSICAL EXAM:  	Constitutional: Elderly female NAD, laying in bed  	HEENT: PERRLA, EOMI ; mild conjunctival pallor, moist oral mucosa  	Respiratory: mild crackles anterior lower chest b/l, no wheezing/rales;     	Cardiovascular: S1 and S2, RRR, no m/r/g   	Gastrointestinal: BS+ normoactive, soft, ND, NTTP   	Extremities: trace edema of lower ext b/l;    	Vascular: 2+ peripheral pulses DP  	Neurological: AAO x 3, no focal deficits  	Musculoskeletal: 5/5 strength b/l upper and lower extremities    Skin: No rashes, no diaphoresis      R femoral groin line in place  C/D/I;  no swelling or warmth;      Lab          CBC Full  -  ( 19 Mar 2018 05:56 )  WBC Count : 9.8 K/uL  Hemoglobin : 9.4 g/dL    Normocytic Anemia  Hematocrit : 28.4 %  Platelet Count - Automated : 489 K/uL  Mean Cell Volume : 86.1 fl  Mean Cell Hemoglobin : 28.5 pg  Mean Cell Hemoglobin Concentration : 33.1 g/dL  Auto Neutrophil # : 7.5 K/uL  Auto Lymphocyte # : 0.8 K/uL  Auto Monocyte # : 1.1 K/uL  Auto Eosinophil # : 0.2 K/uL  Auto Basophil # : 0.0 K/uL  Auto Neutrophil % : 76.8 %  Auto Lymphocyte % : 8.3 %  Auto Monocyte % : 11.6 %  Auto Eosinophil % : 2.4 %  Auto Basophil % : 0.2 %                        9.4    9.8   )-----------( 489      ( 19 Mar 2018 05:56 )             28.4     03-19    126<L>  |  86<L>  |  30.0<H>  ----------------------------<  193<H>  3.8   |  24.0  |  4.53<H>    Ca    8.5<L>      19 Mar 2018 05:56  Phos  4.8     03-20  Mg     2.0     03-19    PT/INR - ( 19 Mar 2018 05:56 )   PT: 13.4 sec;   INR: 1.21 ratio    PTT - ( 19 Mar 2018 05:56 )  PTT:30.2 sec    MICROBIOLOGY/CULTURES:  Culture Results:   <10,000 CFU/ml Gram Positive Cocci in Clusters  <10,000 CFU/ml Gram Positive Rods (03-17 @ 21:37)  Culture Results:   No growth at 5 days. (03-13 @ 22:13)  Culture Results:   No growth at 5 days. (03-13 @ 22:12)      MEDICATIONS  (STANDING):  amiodarone    Tablet 400 milliGRAM(s) Oral every 12 hours  amiodarone Infusion 1 mG/Min (33.333 mL/Hr) IV Continuous <Continuous>  amiodarone Infusion 0.5 mG/Min (16.667 mL/Hr) IV Continuous <Continuous>  artificial  tears Solution 1 Drop(s) Both EYES every 12 hours  aspirin enteric coated 81 milliGRAM(s) Oral daily  cloNIDine 0.1 milliGRAM(s) Oral two times a day  colchicine 0.6 milliGRAM(s) Oral two times a day  dextrose 5%. 1000 milliLiter(s) (50 mL/Hr) IV Continuous <Continuous>  dextrose 50% Injectable 12.5 Gram(s) IV Push once  dextrose 50% Injectable 25 Gram(s) IV Push once  dextrose 50% Injectable 25 Gram(s) IV Push once  docusate sodium 100 milliGRAM(s) Oral two times a day  hydrALAZINE 25 milliGRAM(s) Oral every 8 hours  insulin lispro (HumaLOG) corrective regimen sliding scale   SubCutaneous three times a day before meals  insulin lispro (HumaLOG) corrective regimen sliding scale   SubCutaneous at bedtime  metoclopramide Injectable 10 milliGRAM(s) IV Push every 12 hours  metoprolol     tartrate 25 milliGRAM(s) Oral every 12 hours  pantoprazole    Tablet 40 milliGRAM(s) Oral two times a day  polyethylene glycol 3350 17 Gram(s) Oral karolina  senna 2 Tablet(s) Oral at bedtime  simvastatin 40 milliGRAM(s) Oral at bedtime    MEDICATIONS  (PRN):  acetaminophen   Tablet 650 milliGRAM(s) Oral every 6 hours PRN For Temp greater than 38 C (100.4 F)  acetaminophen   Tablet. 650 milliGRAM(s) Oral every 6 hours PRN Moderate Pain (4 - 6)  dextrose Gel 1 Dose(s) Oral once PRN Blood Glucose LESS THAN 70 milliGRAM(s)/deciliter  glucagon  Injectable 1 milliGRAM(s) IntraMuscular once PRN Glucose LESS THAN 70 milligrams/deciliter  hydrALAZINE Injectable 20 milliGRAM(s) IV Push every 6 hours PRN SBP > 160  ondansetron Injectable 4 milliGRAM(s) IV Push every 6 hours PRN Nausea          Radiology  Chest Xray 3/19  Bilateral pleural effusions and bibasilar atelectasis without significant   change..

## 2018-03-20 NOTE — PROGRESS NOTE ADULT - SUBJECTIVE AND OBJECTIVE BOX
Vascular Follow up    Patient with reported pleuritic pain and dyspnea in supine position yesterday resulting in cancelation of cardiac catherization.    Patient reportedly scheduled today for IR guided Thoracocentesis, which may conflict with our scheduled Permacatheter placement.    Will contact the  primary team to clarify and confirm scheduling of procedures    Keep NPO

## 2018-03-20 NOTE — PROGRESS NOTE ADULT - PROBLEM SELECTOR PLAN 3
-  Diltiazem discontinued, patient switched from amiodarone drip, to po amiodarone.    -contiue po amiodarone and metoprolol;     -Cardiology note appreciated.

## 2018-03-20 NOTE — PROGRESS NOTE ADULT - PROBLEM SELECTOR PLAN 1
Markedly improved. Eventual EGD once pt. cleared by Cardiology. Continue current diet and management until then, Repeat labs ordered for tomorrow AM. Eventual EGD once pt. cleared by Cardiology and Pulmonary. Continue current diet and management until then, Repeat labs ordered for tomorrow AM.

## 2018-03-20 NOTE — PROGRESS NOTE ADULT - PROBLEM SELECTOR PLAN 6
-bp stable, SBP peaks to the high 150's but patient has been receiving daily HD  -c/w clonidine and will up titrate as needed  -c/w hydralazine 25mgpo q 8hrs

## 2018-03-20 NOTE — PROGRESS NOTE ADULT - SUBJECTIVE AND OBJECTIVE BOX
Pt seen and examined. Cardiac cath yesterday was cancelled because of respiratory distress and pt. underwent thoracentesis yesterday. In no obvious distress this AM. For Cardiac cath today and when cleared by Cardiology will proceed with EGD.    MEDICATIONS:  MEDICATIONS  (STANDING):  amiodarone    Tablet 400 milliGRAM(s) Oral every 12 hours  amiodarone Infusion 1 mG/Min (33.333 mL/Hr) IV Continuous <Continuous>  amiodarone Infusion 0.5 mG/Min (16.667 mL/Hr) IV Continuous <Continuous>  artificial  tears Solution 1 Drop(s) Both EYES every 12 hours  aspirin enteric coated 81 milliGRAM(s) Oral daily  cloNIDine 0.1 milliGRAM(s) Oral two times a day  colchicine 0.6 milliGRAM(s) Oral two times a day  dextrose 5%. 1000 milliLiter(s) (50 mL/Hr) IV Continuous <Continuous>  dextrose 50% Injectable 12.5 Gram(s) IV Push once  dextrose 50% Injectable 25 Gram(s) IV Push once  dextrose 50% Injectable 25 Gram(s) IV Push once  docusate sodium 100 milliGRAM(s) Oral two times a day  hydrALAZINE 25 milliGRAM(s) Oral every 8 hours  insulin lispro (HumaLOG) corrective regimen sliding scale   SubCutaneous three times a day before meals  insulin lispro (HumaLOG) corrective regimen sliding scale   SubCutaneous at bedtime  metoclopramide Injectable 10 milliGRAM(s) IV Push every 12 hours  metoprolol     tartrate 25 milliGRAM(s) Oral every 12 hours  pantoprazole    Tablet 40 milliGRAM(s) Oral two times a day  polyethylene glycol 3350 17 Gram(s) Oral daily  PPD  5 Tuberculin Unit(s) Injectable 5 Unit(s) IntraDermal once  senna 2 Tablet(s) Oral at bedtime  simvastatin 40 milliGRAM(s) Oral at bedtime    MEDICATIONS  (PRN):  acetaminophen   Tablet 650 milliGRAM(s) Oral every 6 hours PRN For Temp greater than 38 C (100.4 F)  acetaminophen   Tablet. 650 milliGRAM(s) Oral every 6 hours PRN Moderate Pain (4 - 6)  dextrose Gel 1 Dose(s) Oral once PRN Blood Glucose LESS THAN 70 milliGRAM(s)/deciliter  glucagon  Injectable 1 milliGRAM(s) IntraMuscular once PRN Glucose LESS THAN 70 milligrams/deciliter  hydrALAZINE Injectable 20 milliGRAM(s) IV Push every 6 hours PRN SBP > 160  ondansetron Injectable 4 milliGRAM(s) IV Push every 6 hours PRN Nausea      Allergies    No Known Allergies    Intolerances        Vital Signs Last 24 Hrs  T(C): 37.1 (20 Mar 2018 05:43), Max: 37.1 (20 Mar 2018 05:43)  T(F): 98.7 (20 Mar 2018 05:43), Max: 98.7 (20 Mar 2018 05:43)  HR: 70 (20 Mar 2018 05:43) (63 - 100)  BP: 164/92 (20 Mar 2018 05:43) (134/88 - 179/93)  BP(mean): --  RR: 16 (20 Mar 2018 05:43) (16 - 20)  SpO2: 99% (20 Mar 2018 05:43) (97% - 99%)      PHYSICAL EXAM:    General: Well developed; well nourished; in no acute distress  HEENT: MMM, conjunctiva pink and sclera anicteric.  Lungs: clear to auscultation and percussion.  Cor: RRR S1, S2 only  Gastrointestinal: Abdomen: Soft non-tender non-distended; Normal bowel sounds; No hepatosplenomegaly  Extremities: no cyanosis, clubbing or edema.  Skin: Warm and dry. No obvious rash  Neuro: Pt. a + o x 3    LABS:      CBC Full  -  ( 19 Mar 2018 05:56 )  WBC Count : 9.8 K/uL  Hemoglobin : 9.4 g/dL  Hematocrit : 28.4 %  Platelet Count - Automated : 489 K/uL  Mean Cell Volume : 86.1 fl  Mean Cell Hemoglobin : 28.5 pg  Mean Cell Hemoglobin Concentration : 33.1 g/dL  Auto Neutrophil # : 7.5 K/uL  Auto Lymphocyte # : 0.8 K/uL  Auto Monocyte # : 1.1 K/uL  Auto Eosinophil # : 0.2 K/uL  Auto Basophil # : 0.0 K/uL  Auto Neutrophil % : 76.8 %  Auto Lymphocyte % : 8.3 %  Auto Monocyte % : 11.6 %  Auto Eosinophil % : 2.4 %  Auto Basophil % : 0.2 %    03-19    126<L>  |  86<L>  |  30.0<H>  ----------------------------<  193<H>  3.8   |  24.0  |  4.53<H>    Ca    8.5<L>      19 Mar 2018 05:56  Phos  4.8     03-20  Mg     2.0     03-19      PT/INR - ( 19 Mar 2018 05:56 )   PT: 13.4 sec;   INR: 1.21 ratio         PTT - ( 19 Mar 2018 05:56 )  PTT:30.2 sec                  RADIOLOGY & ADDITIONAL STUDIES (The following images were personally reviewed): Pt seen and examined. Cardiac cath yesterday was cancelled because of respiratory distress and pt. to undergo thoracentesis by IR either later today or tomorrow. She is also awaiting Permacath placement, In no obvious distress this AM. Also for eventual cardiac cath and when cleared by Cardiology will proceed with EGD.    MEDICATIONS:  MEDICATIONS  (STANDING):  amiodarone    Tablet 400 milliGRAM(s) Oral every 12 hours  amiodarone Infusion 1 mG/Min (33.333 mL/Hr) IV Continuous <Continuous>  amiodarone Infusion 0.5 mG/Min (16.667 mL/Hr) IV Continuous <Continuous>  artificial  tears Solution 1 Drop(s) Both EYES every 12 hours  aspirin enteric coated 81 milliGRAM(s) Oral daily  cloNIDine 0.1 milliGRAM(s) Oral two times a day  colchicine 0.6 milliGRAM(s) Oral two times a day  dextrose 5%. 1000 milliLiter(s) (50 mL/Hr) IV Continuous <Continuous>  dextrose 50% Injectable 12.5 Gram(s) IV Push once  dextrose 50% Injectable 25 Gram(s) IV Push once  dextrose 50% Injectable 25 Gram(s) IV Push once  docusate sodium 100 milliGRAM(s) Oral two times a day  hydrALAZINE 25 milliGRAM(s) Oral every 8 hours  insulin lispro (HumaLOG) corrective regimen sliding scale   SubCutaneous three times a day before meals  insulin lispro (HumaLOG) corrective regimen sliding scale   SubCutaneous at bedtime  metoclopramide Injectable 10 milliGRAM(s) IV Push every 12 hours  metoprolol     tartrate 25 milliGRAM(s) Oral every 12 hours  pantoprazole    Tablet 40 milliGRAM(s) Oral two times a day  polyethylene glycol 3350 17 Gram(s) Oral daily  PPD  5 Tuberculin Unit(s) Injectable 5 Unit(s) IntraDermal once  senna 2 Tablet(s) Oral at bedtime  simvastatin 40 milliGRAM(s) Oral at bedtime    MEDICATIONS  (PRN):  acetaminophen   Tablet 650 milliGRAM(s) Oral every 6 hours PRN For Temp greater than 38 C (100.4 F)  acetaminophen   Tablet. 650 milliGRAM(s) Oral every 6 hours PRN Moderate Pain (4 - 6)  dextrose Gel 1 Dose(s) Oral once PRN Blood Glucose LESS THAN 70 milliGRAM(s)/deciliter  glucagon  Injectable 1 milliGRAM(s) IntraMuscular once PRN Glucose LESS THAN 70 milligrams/deciliter  hydrALAZINE Injectable 20 milliGRAM(s) IV Push every 6 hours PRN SBP > 160  ondansetron Injectable 4 milliGRAM(s) IV Push every 6 hours PRN Nausea      Allergies    No Known Allergies    Intolerances        Vital Signs Last 24 Hrs  T(C): 37.1 (20 Mar 2018 05:43), Max: 37.1 (20 Mar 2018 05:43)  T(F): 98.7 (20 Mar 2018 05:43), Max: 98.7 (20 Mar 2018 05:43)  HR: 70 (20 Mar 2018 05:43) (63 - 100)  BP: 164/92 (20 Mar 2018 05:43) (134/88 - 179/93)  BP(mean): --  RR: 16 (20 Mar 2018 05:43) (16 - 20)  SpO2: 99% (20 Mar 2018 05:43) (97% - 99%)      PHYSICAL EXAM:    General: Well developed; well nourished; in no acute distress  HEENT: MMM, conjunctiva pink and sclera anicteric.  Lungs: Decreased breath sounds bilaterally.  Cor: RRR S1, S2 only  Gastrointestinal: Abdomen: Soft non-tender non-distended; Normal bowel sounds; No hepatosplenomegaly  Extremities: no cyanosis, clubbing or edema.  Skin: Warm and dry. No obvious rash  Neuro: Pt. a + o x 3    LABS:      CBC Full  -  ( 19 Mar 2018 05:56 )  WBC Count : 9.8 K/uL  Hemoglobin : 9.4 g/dL  Hematocrit : 28.4 %  Platelet Count - Automated : 489 K/uL  Mean Cell Volume : 86.1 fl  Mean Cell Hemoglobin : 28.5 pg  Mean Cell Hemoglobin Concentration : 33.1 g/dL  Auto Neutrophil # : 7.5 K/uL  Auto Lymphocyte # : 0.8 K/uL  Auto Monocyte # : 1.1 K/uL  Auto Eosinophil # : 0.2 K/uL  Auto Basophil # : 0.0 K/uL  Auto Neutrophil % : 76.8 %  Auto Lymphocyte % : 8.3 %  Auto Monocyte % : 11.6 %  Auto Eosinophil % : 2.4 %  Auto Basophil % : 0.2 %    03-19    126<L>  |  86<L>  |  30.0<H>  ----------------------------<  193<H>  3.8   |  24.0  |  4.53<H>    Ca    8.5<L>      19 Mar 2018 05:56  Phos  4.8     03-20  Mg     2.0     03-19      PT/INR - ( 19 Mar 2018 05:56 )   PT: 13.4 sec;   INR: 1.21 ratio         PTT - ( 19 Mar 2018 05:56 )  PTT:30.2 sec                  RADIOLOGY & ADDITIONAL STUDIES (The following images were personally reviewed):

## 2018-03-20 NOTE — PROGRESS NOTE ADULT - ATTENDING COMMENTS
I have seen and examined the patient, reviewed the chart, labs and diagnostics and I agree with assessment and plan as above with resident team. note edited as above where required    X ray chest Persistent B/L Pleural effusions.  S/P  Diagnostic and therapeutic pleurocentesis done Rt sided, with the removal of 1100 CC pleural fluid.  Also planning for Perma cath placement on Thursday for HD access and remove Rt Femoral HD access to prevent from infection    still requiring supplemental oxygen Will Discuss with IR to see if we can drain Left LUNG Also and then wean Oxygen as tolerated.    36 minutes spent on total encounter; more than 50% of the visit was spent counseling and/or coordinating care by the attending physician.     Plan discussed with Cardiology, Vascular surgery and Nursing staff.

## 2018-03-20 NOTE — PROGRESS NOTE ADULT - ASSESSMENT
57 y.o. F with hx of HTN, HLD, DMII, hyperkalemia, ckd 3 who presented with 3 weeks of postprandial vomiting and abd pain, noted to have ARF, fluid overload with bilateral pleural effusions and pericardial effusion as well as symptomatic anemia likely multifactorial secondary to acute on chronic renal failure/ KULWANT and suspected upper GI Bleeding from vomiting/retching and NSAID use. Clinical concern that worsening acute on chronic renal failure possibly caused uremia related sx of N/V, with patients NSAID use and retching suspected GI bleed but occult blood returned negative, for dropping h/h pt received 2 u prbc on admission with appropriate response to therapy. 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 In the interim while being dialyzed the patient had an episode of PAF, s/p cardizem became rate controlled and remained in NSR. EP consulted. Unable to AC the patient due to initial concern for GI bleeding, for which GI consulted and pt pending possible GI intervention when respiratory status improves. Clinical concern for ARF causing volume overload, unclear if HFPEF is a contributing factor. Patient will need eventual ischemia evaluation with cardiology, cath deferred today due to respiratory distress, pleural effusions worsening on CXR, IR consulted for pleurocentesis.     S/P Hemodialysis on [place] 3/18, 1.6 Liters taken off;    Spoke with IR attending, agrees for Pleurocentesis tomorrow, patient npo after midnight;     Patient also scheduled for Permacatheter placement today, will talk with vascular pa and ir attending;

## 2018-03-21 ENCOUNTER — RESULT REVIEW (OUTPATIENT)
Age: 58
End: 2018-03-21

## 2018-03-21 ENCOUNTER — TRANSCRIPTION ENCOUNTER (OUTPATIENT)
Age: 58
End: 2018-03-21

## 2018-03-21 DIAGNOSIS — R11.2 NAUSEA WITH VOMITING, UNSPECIFIED: ICD-10-CM

## 2018-03-21 DIAGNOSIS — N18.9 CHRONIC KIDNEY DISEASE, UNSPECIFIED: ICD-10-CM

## 2018-03-21 LAB
24R-OH-CALCIDIOL SERPL-MCNC: 16 NG/ML — LOW (ref 30–80)
ALBUMIN FLD-MCNC: 1.3 G/DL — SIGNIFICANT CHANGE UP
ALBUMIN FLD-MCNC: 1.5 G/DL — SIGNIFICANT CHANGE UP
ANION GAP SERPL CALC-SCNC: 13 MMOL/L — SIGNIFICANT CHANGE UP (ref 5–17)
ANISOCYTOSIS BLD QL: SLIGHT — SIGNIFICANT CHANGE UP
B PERT IGG+IGM PNL SER: CLEAR — SIGNIFICANT CHANGE UP
BUN SERPL-MCNC: 26 MG/DL — HIGH (ref 8–20)
CALCIUM SERPL-MCNC: 7.8 MG/DL — LOW (ref 8.6–10.2)
CHLORIDE SERPL-SCNC: 93 MMOL/L — LOW (ref 98–107)
CO2 SERPL-SCNC: 24 MMOL/L — SIGNIFICANT CHANGE UP (ref 22–29)
COLOR FLD: YELLOW
CREAT SERPL-MCNC: 4.27 MG/DL — HIGH (ref 0.5–1.3)
EOSINOPHIL NFR BLD AUTO: 3 % — SIGNIFICANT CHANGE UP (ref 0–5)
FLUID INTAKE SUBSTANCE CLASS: SIGNIFICANT CHANGE UP
FLUID SEGMENTED GRANULOCYTES: 53 % — SIGNIFICANT CHANGE UP
GLUCOSE BLDC GLUCOMTR-MCNC: 155 MG/DL — HIGH (ref 70–99)
GLUCOSE BLDC GLUCOMTR-MCNC: 156 MG/DL — HIGH (ref 70–99)
GLUCOSE BLDC GLUCOMTR-MCNC: 162 MG/DL — HIGH (ref 70–99)
GLUCOSE BLDC GLUCOMTR-MCNC: 189 MG/DL — HIGH (ref 70–99)
GLUCOSE FLD-MCNC: 168 MG/DL — SIGNIFICANT CHANGE UP
GLUCOSE FLD-MCNC: 172 MG/DL — SIGNIFICANT CHANGE UP
GLUCOSE SERPL-MCNC: 138 MG/DL — HIGH (ref 70–115)
GRAM STN FLD: SIGNIFICANT CHANGE UP
HCT VFR BLD CALC: 25.5 % — LOW (ref 37–47)
HGB BLD-MCNC: 8.3 G/DL — LOW (ref 12–16)
HYPOCHROMIA BLD QL: SLIGHT — SIGNIFICANT CHANGE UP
LDH SERPL L TO P-CCNC: 142 U/L — SIGNIFICANT CHANGE UP
LDH SERPL L TO P-CCNC: 88 U/L — SIGNIFICANT CHANGE UP
LYMPHOCYTES # BLD AUTO: 3 % — LOW (ref 20–55)
LYMPHOCYTES # FLD: 17 % — SIGNIFICANT CHANGE UP
MCHC RBC-ENTMCNC: 28.4 PG — SIGNIFICANT CHANGE UP (ref 27–31)
MCHC RBC-ENTMCNC: 32.5 G/DL — SIGNIFICANT CHANGE UP (ref 32–36)
MCV RBC AUTO: 87.3 FL — SIGNIFICANT CHANGE UP (ref 81–99)
MESOTHL CELL # FLD: 10 % — SIGNIFICANT CHANGE UP
MONOCYTES NFR BLD AUTO: 4 % — SIGNIFICANT CHANGE UP (ref 3–10)
MONOS+MACROS # FLD: 20 % — SIGNIFICANT CHANGE UP
NEUTROPHILS NFR BLD AUTO: 89 % — HIGH (ref 37–73)
PH FLD: 8 — SIGNIFICANT CHANGE UP
PLAT MORPH BLD: SIGNIFICANT CHANGE UP
PLATELET # BLD AUTO: 432 K/UL — HIGH (ref 150–400)
POIKILOCYTOSIS BLD QL AUTO: SLIGHT — SIGNIFICANT CHANGE UP
POLYCHROMASIA BLD QL SMEAR: SLIGHT — SIGNIFICANT CHANGE UP
POTASSIUM SERPL-MCNC: 4.3 MMOL/L — SIGNIFICANT CHANGE UP (ref 3.5–5.3)
POTASSIUM SERPL-SCNC: 4.3 MMOL/L — SIGNIFICANT CHANGE UP (ref 3.5–5.3)
PROT FLD-MCNC: 2.5 G/DL — SIGNIFICANT CHANGE UP
PROT FLD-MCNC: 2.8 G/DL — SIGNIFICANT CHANGE UP
PTH-INTACT FLD-MCNC: 175 PG/ML — HIGH (ref 15–65)
RBC # BLD: 2.92 M/UL — LOW (ref 4.4–5.2)
RBC # FLD: 13.8 % — SIGNIFICANT CHANGE UP (ref 11–15.6)
RBC BLD AUTO: ABNORMAL
RCV VOL RI: 265 /UL — HIGH (ref 0–5)
SODIUM SERPL-SCNC: 130 MMOL/L — LOW (ref 135–145)
SPECIMEN SOURCE FLD: SIGNIFICANT CHANGE UP
SPECIMEN SOURCE: SIGNIFICANT CHANGE UP
TOTAL NUCLEATED CELL COUNT, BODY FLUID: 100 /UL — HIGH (ref 0–5)
TUBE TYPE: SIGNIFICANT CHANGE UP
VARIANT LYMPHS # BLD: 1 % — SIGNIFICANT CHANGE UP (ref 0–6)
WBC # BLD: 6.6 K/UL — SIGNIFICANT CHANGE UP (ref 4.8–10.8)
WBC # FLD AUTO: 6.6 K/UL — SIGNIFICANT CHANGE UP (ref 4.8–10.8)

## 2018-03-21 PROCEDURE — 88305 TISSUE EXAM BY PATHOLOGIST: CPT | Mod: 26

## 2018-03-21 PROCEDURE — 99233 SBSQ HOSP IP/OBS HIGH 50: CPT | Mod: GC

## 2018-03-21 PROCEDURE — 71045 X-RAY EXAM CHEST 1 VIEW: CPT | Mod: 26

## 2018-03-21 PROCEDURE — 88112 CYTOPATH CELL ENHANCE TECH: CPT | Mod: 26

## 2018-03-21 PROCEDURE — 32555 ASPIRATE PLEURA W/ IMAGING: CPT | Mod: LT,59

## 2018-03-21 PROCEDURE — 93010 ELECTROCARDIOGRAM REPORT: CPT

## 2018-03-21 PROCEDURE — 99233 SBSQ HOSP IP/OBS HIGH 50: CPT

## 2018-03-21 PROCEDURE — 99232 SBSQ HOSP IP/OBS MODERATE 35: CPT

## 2018-03-21 RX ORDER — ERYTHROPOIETIN 10000 [IU]/ML
10000 INJECTION, SOLUTION INTRAVENOUS; SUBCUTANEOUS
Qty: 0 | Refills: 0 | Status: DISCONTINUED | OUTPATIENT
Start: 2018-03-21 | End: 2018-03-28

## 2018-03-21 RX ORDER — ERGOCALCIFEROL 1.25 MG/1
50000 CAPSULE ORAL
Qty: 0 | Refills: 0 | Status: DISCONTINUED | OUTPATIENT
Start: 2018-03-21 | End: 2018-03-28

## 2018-03-21 RX ORDER — CALCIUM ACETATE 667 MG
667 TABLET ORAL
Qty: 0 | Refills: 0 | Status: DISCONTINUED | OUTPATIENT
Start: 2018-03-21 | End: 2018-03-28

## 2018-03-21 RX ADMIN — Medication 25 MILLIGRAM(S): at 21:02

## 2018-03-21 RX ADMIN — Medication 0.1 MILLIGRAM(S): at 06:28

## 2018-03-21 RX ADMIN — Medication 20 MILLIGRAM(S): at 06:28

## 2018-03-21 RX ADMIN — ONDANSETRON 4 MILLIGRAM(S): 8 TABLET, FILM COATED ORAL at 06:29

## 2018-03-21 RX ADMIN — POLYETHYLENE GLYCOL 3350 17 GRAM(S): 17 POWDER, FOR SOLUTION ORAL at 17:20

## 2018-03-21 RX ADMIN — Medication 10 MILLIGRAM(S): at 17:20

## 2018-03-21 RX ADMIN — CARVEDILOL PHOSPHATE 12.5 MILLIGRAM(S): 80 CAPSULE, EXTENDED RELEASE ORAL at 06:27

## 2018-03-21 RX ADMIN — CARVEDILOL PHOSPHATE 12.5 MILLIGRAM(S): 80 CAPSULE, EXTENDED RELEASE ORAL at 19:09

## 2018-03-21 RX ADMIN — Medication 1: at 12:35

## 2018-03-21 RX ADMIN — Medication 1 DROP(S): at 17:20

## 2018-03-21 RX ADMIN — PANTOPRAZOLE SODIUM 40 MILLIGRAM(S): 20 TABLET, DELAYED RELEASE ORAL at 17:20

## 2018-03-21 RX ADMIN — Medication 100 MILLIGRAM(S): at 06:28

## 2018-03-21 RX ADMIN — ERGOCALCIFEROL 50000 UNIT(S): 1.25 CAPSULE ORAL at 12:03

## 2018-03-21 RX ADMIN — Medication 0.1 MILLIGRAM(S): at 17:20

## 2018-03-21 RX ADMIN — Medication 25 MILLIGRAM(S): at 14:26

## 2018-03-21 RX ADMIN — Medication 100 MILLIGRAM(S): at 17:20

## 2018-03-21 RX ADMIN — Medication 667 MILLIGRAM(S): at 17:20

## 2018-03-21 RX ADMIN — SIMVASTATIN 40 MILLIGRAM(S): 20 TABLET, FILM COATED ORAL at 21:02

## 2018-03-21 RX ADMIN — Medication 25 MILLIGRAM(S): at 06:28

## 2018-03-21 RX ADMIN — PANTOPRAZOLE SODIUM 40 MILLIGRAM(S): 20 TABLET, DELAYED RELEASE ORAL at 06:28

## 2018-03-21 RX ADMIN — Medication 0.6 MILLIGRAM(S): at 06:27

## 2018-03-21 RX ADMIN — Medication 1: at 17:27

## 2018-03-21 RX ADMIN — SENNA PLUS 2 TABLET(S): 8.6 TABLET ORAL at 21:02

## 2018-03-21 RX ADMIN — Medication 0.6 MILLIGRAM(S): at 17:20

## 2018-03-21 RX ADMIN — Medication 1 DROP(S): at 06:27

## 2018-03-21 RX ADMIN — Medication 81 MILLIGRAM(S): at 12:02

## 2018-03-21 RX ADMIN — Medication 10 MILLIGRAM(S): at 06:28

## 2018-03-21 NOTE — PROGRESS NOTE ADULT - PROBLEM SELECTOR PLAN 3
due to above and resolved. Plan for EGD once cleared and stable from cardiopulmonary perspective. As patient is no stable from GI standpoint this procedure is semi elective with no urgency at this time.

## 2018-03-21 NOTE — PROGRESS NOTE ADULT - ASSESSMENT
57 y.o. F with hx of HTN, HLD, DMII, hyperkalemia, ckd 3 who presented with 3 weeks of postprandial vomiting and abd pain, noted to have ARF, fluid overload with bilateral pleural effusions and pericardial effusion as well as symptomatic anemia likely multifactorial secondary to acute on chronic renal failure and suspected upper GI Bleeding from vomiting/retching and NSAID use. Clinical concern that worsening acute on chronic renal failure possibly caused uremia related sx of N/V, with patients NSAID use and retching suspected GI bleed but occult blood returned negative, for dropping h/h pt received 2 u prbc on admission with appropriate response to therapy. 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 In the interim while being dialyzed the patient had an episode of PAF, s/p cardizem became rate controlled and remained in NSR. EP consulted. Unable to AC the patient due to initial concern for GI bleeding, for which GI consulted and pt pending possible GI intervention when respiratory status improves. Clinical concern for ARF causing volume overload, unclear if HFPEF is a contributing factor. Patient will need eventual ischemia evaluation with cardiology, cath deferred today due to respiratory distress, pleural effusions worsening on CXR, IR consulted for pleurocentesis.  S/P Hemodialysis on [place] 3/18, 1.6 Liters taken off.

## 2018-03-21 NOTE — PROGRESS NOTE ADULT - SUBJECTIVE AND OBJECTIVE BOX
Pt seen and examined f/u anemia, epigastric pain and episode of hematemesis PTA  This morning she has no abdominal pain, nausea or vomiting and is tolerating solid food. Next HD tomorrow. Had thoracentesis yesterday of about 1100ml fluid from right chest. Hep A,B and C serologies negative.    REVIEW OF SYSTEMS:    CONSTITUTIONAL: No fever, weight loss, or fatigue  EYES: No eye pain, visual disturbances, or discharge  ENMT:  No difficulty hearing, tinnitus, vertigo; No sinus or throat pain  RESPIRATORY: No cough, wheezing, chills or hemoptysis; No shortness of breath  CARDIOVASCULAR: No chest pain, palpitations, dizziness, or leg swelling  GASTROINTESTINAL: No abdominal or epigastric pain. No nausea, vomiting, or hematemesis; No diarrhea or constipation. No melena or hematochezia.    MEDICATIONS:  MEDICATIONS  (STANDING):  artificial  tears Solution 1 Drop(s) Both EYES every 12 hours  aspirin enteric coated 81 milliGRAM(s) Oral daily  carvedilol 12.5 milliGRAM(s) Oral every 12 hours  cloNIDine 0.1 milliGRAM(s) Oral two times a day  colchicine 0.6 milliGRAM(s) Oral two times a day  dextrose 5%. 1000 milliLiter(s) (50 mL/Hr) IV Continuous <Continuous>  dextrose 50% Injectable 12.5 Gram(s) IV Push once  dextrose 50% Injectable 25 Gram(s) IV Push once  dextrose 50% Injectable 25 Gram(s) IV Push once  docusate sodium 100 milliGRAM(s) Oral two times a day  furosemide    Tablet 20 milliGRAM(s) Oral daily  hydrALAZINE 25 milliGRAM(s) Oral every 8 hours  insulin lispro (HumaLOG) corrective regimen sliding scale   SubCutaneous three times a day before meals  insulin lispro (HumaLOG) corrective regimen sliding scale   SubCutaneous at bedtime  metoclopramide Injectable 10 milliGRAM(s) IV Push every 12 hours  pantoprazole    Tablet 40 milliGRAM(s) Oral two times a day  polyethylene glycol 3350 17 Gram(s) Oral daily  PPD  5 Tuberculin Unit(s) Injectable 5 Unit(s) IntraDermal once  senna 2 Tablet(s) Oral at bedtime  simvastatin 40 milliGRAM(s) Oral at bedtime    MEDICATIONS  (PRN):  acetaminophen   Tablet 650 milliGRAM(s) Oral every 6 hours PRN For Temp greater than 38 C (100.4 F)  acetaminophen   Tablet. 650 milliGRAM(s) Oral every 6 hours PRN Moderate Pain (4 - 6)  dextrose Gel 1 Dose(s) Oral once PRN Blood Glucose LESS THAN 70 milliGRAM(s)/deciliter  glucagon  Injectable 1 milliGRAM(s) IntraMuscular once PRN Glucose LESS THAN 70 milligrams/deciliter  hydrALAZINE Injectable 20 milliGRAM(s) IV Push every 6 hours PRN SBP > 160  ondansetron Injectable 4 milliGRAM(s) IV Push every 6 hours PRN Nausea      Allergies    No Known Allergies    Intolerances        Vital Signs Last 24 Hrs  T(C): 36.9 (21 Mar 2018 06:23), Max: 37 (20 Mar 2018 15:00)  T(F): 98.4 (21 Mar 2018 06:23), Max: 98.6 (20 Mar 2018 15:00)  HR: 76 (21 Mar 2018 06:23) (70 - 76)  BP: 152/72 (21 Mar 2018 06:23) (144/70 - 154/82)  BP(mean): --  RR: 18 (21 Mar 2018 06:23) (16 - 18)  SpO2: 95% (21 Mar 2018 06:23) (94% - 99%)    03-20 @ 07:01  -  03-21 @ 07:00  --------------------------------------------------------  IN: 0 mL / OUT: 1000 mL / NET: -1000 mL        PHYSICAL EXAM:    General: Well developed; well nourished; in no acute distress  HEENT: MMM, conjunctiva and sclera clear  Gastrointestinal:Abdomen: Soft non-tender non-distended; Normal bowel sounds; No hepatosplenomegaly  Extremities: no cyanosis, clubbing or edema.  Skin: Warm and dry. No obvious rash    LABS:      CBC Full  -  ( 21 Mar 2018 06:04 )  WBC Count : 6.6 K/uL  Hemoglobin : 8.3 g/dL  Hematocrit : 25.5 %  Platelet Count - Automated : 432 K/uL  Mean Cell Volume : 87.3 fl  Mean Cell Hemoglobin : 28.4 pg  Mean Cell Hemoglobin Concentration : 32.5 g/dL  Auto Neutrophil # : x  Auto Lymphocyte # : x  Auto Monocyte # : x  Auto Eosinophil # : x  Auto Basophil # : x  Auto Neutrophil % : x  Auto Lymphocyte % : x  Auto Monocyte % : x  Auto Eosinophil % : x  Auto Basophil % : x    03-21    130<L>  |  93<L>  |  26.0<H>  ----------------------------<  138<H>  4.3   |  24.0  |  4.27<H>    Ca    7.8<L>      21 Mar 2018 06:04  Phos  4.8     03-20    TPro  6.0<L>  /  Alb  2.1<L>  /  TBili  0.3<L>  /  DBili  x   /  AST  62<H>  /  ALT  39<H>  /  AlkPhos  121<H>  03-20

## 2018-03-21 NOTE — PROGRESS NOTE ADULT - PROBLEM SELECTOR PLAN 2
baseline ckd 3 now in ARF with pulm edema/ uremia sx/ uremic pericarditis requiring dialysis     -pt still clinically sob, tachypneic   -bun/cr noted and continues to be trended   - c/w strict I/O      -daily weight   -Frequency of dialysis according to nephrology recommendations, likely to require it in the long term, will need seat in the community.  -hyponatremia is secondary to worsening renal function   -avoid nephrotoxic medications   -renal US noted with chronic medical renal disease no evidence of hydronephrosis   -Acute hepatitis panel negative, Hepb non immune      -ppd at 48 hours 0mm negative thus far   -vascular consult noted and appreciated for femoral access. baseline ckd 3 now in ARF with pulm edema/ uremia sx/ uremic pericarditis requiring dialysis  for the 1 st time during this admission  -daily weight   -Acute hepatitis panel negative,   -ppd at 48 hours 0mm negative thus far   PERMACATH 3/22 by IR

## 2018-03-21 NOTE — PROGRESS NOTE ADULT - PROBLEM SELECTOR PLAN 5
likely multifactorial due to worsening acute on chronic renal insufficiency in the setting of CKD,  suspected GI bleed (initially with coffee ground emesis) but occult stool negative and degree of AOCD/ iron deficiency   -s/p 2 units pRBC on admission with improvement in h/h has remained stable   -last colonoscopy /EGD 2017 WNL as per patient  -occult stool neg  -will c/w monitoring h/h   -anemia panel noted with degree iron deficiency   -c/w ppi po bid   -c/w iron sucrose as per nephro and eventual change to ferrous sulfate   -pt could benefit from epogen   -GI f/u noted and appreciated and recommended conservative management at this time and leave on clear liquid diet when respiratory status improved and after cardio clearance will likely proceed with endoscopic intervention likely multifactorial due to worsening acute on chronic renal insufficiency in the setting of CKD,  suspected GI bleed (initially with coffee ground emesis) but occult stool negative and degree of AOCD/ iron deficiency   -s/p 2 units pRBC on admission with improvement in h/h has remained stable   -last colonoscopy /EGD 2017 WNL as per patient  -occult stool neg  -will c/w monitoring h/h   -anemia panel noted with degree iron deficiency   -c/w ppi po bid   -c/w iron sucrose as per nephro and eventual change to ferrous sulfate   -pt could benefit from epogen   -GI f/u noted and appreciated and recommended conservative management at this time and after cardio clearance will likely proceed with endoscopic intervention

## 2018-03-21 NOTE — PROGRESS NOTE ADULT - ASSESSMENT
CRF: +DM  Likely progression to ESRD  Fluid overload (pleural and pericardial effusions)  - HD tomorrow  - vascular to place chest wall permacath and remove femoral line  - will need outpatient HD arrangements    Anemia:  - add MARGI at Hd  - IV Fe  - trend H/H    RO: add Vitamin D and binders

## 2018-03-21 NOTE — PROGRESS NOTE ADULT - SUBJECTIVE AND OBJECTIVE BOX
PGY-2 Medicine Progress Note    JAROCHO MORALES  MRN: [id number]    BRIEF HOSPITAL COURSE:   Patient is a 57y old  Female who presents with a chief complaint of N/V and dark stools noted to be in acute pulm edema/pericardial effusion with ARF requiring HD    Events last 24 hours:   s/p R thoracocentesis on 3/20 with 1100 cc removed  plan for L thoracocentesis today  No acute events overnight      MEDICATIONS  (STANDING):  artificial  tears Solution 1 Drop(s) Both EYES every 12 hours  aspirin enteric coated 81 milliGRAM(s) Oral daily  calcium acetate 667 milliGRAM(s) Oral three times a day with meals  carvedilol 12.5 milliGRAM(s) Oral every 12 hours  cloNIDine 0.1 milliGRAM(s) Oral two times a day  colchicine 0.6 milliGRAM(s) Oral two times a day  dextrose 5%. 1000 milliLiter(s) (50 mL/Hr) IV Continuous <Continuous>  dextrose 50% Injectable 12.5 Gram(s) IV Push once  dextrose 50% Injectable 25 Gram(s) IV Push once  dextrose 50% Injectable 25 Gram(s) IV Push once  docusate sodium 100 milliGRAM(s) Oral two times a day  epoetin gian Injectable 22579 Unit(s) IV Push <User Schedule>  ergocalciferol 72178 Unit(s) Oral every week  furosemide    Tablet 20 milliGRAM(s) Oral daily  hydrALAZINE 25 milliGRAM(s) Oral every 8 hours  insulin lispro (HumaLOG) corrective regimen sliding scale   SubCutaneous three times a day before meals  insulin lispro (HumaLOG) corrective regimen sliding scale   SubCutaneous at bedtime  metoclopramide Injectable 10 milliGRAM(s) IV Push every 12 hours  pantoprazole    Tablet 40 milliGRAM(s) Oral two times a day  polyethylene glycol 3350 17 Gram(s) Oral daily  PPD  5 Tuberculin Unit(s) Injectable 5 Unit(s) IntraDermal once  senna 2 Tablet(s) Oral at bedtime  simvastatin 40 milliGRAM(s) Oral at bedtime    MEDICATIONS  (PRN):  acetaminophen   Tablet 650 milliGRAM(s) Oral every 6 hours PRN For Temp greater than 38 C (100.4 F)  acetaminophen   Tablet. 650 milliGRAM(s) Oral every 6 hours PRN Moderate Pain (4 - 6)  dextrose Gel 1 Dose(s) Oral once PRN Blood Glucose LESS THAN 70 milliGRAM(s)/deciliter  glucagon  Injectable 1 milliGRAM(s) IntraMuscular once PRN Glucose LESS THAN 70 milligrams/deciliter  hydrALAZINE Injectable 20 milliGRAM(s) IV Push every 6 hours PRN SBP > 160  ondansetron Injectable 4 milliGRAM(s) IV Push every 6 hours PRN Nausea      Vital Signs Last 24 Hrs  T(C): 36.7 (21 Mar 2018 10:05), Max: 37 (20 Mar 2018 15:00)  T(F): 98 (21 Mar 2018 10:05), Max: 98.6 (20 Mar 2018 15:00)  HR: 66 (21 Mar 2018 09:49) (66 - 76)  BP: 144/78 (21 Mar 2018 09:49) (144/70 - 152/72)  BP(mean): --  RR: 18 (21 Mar 2018 09:49) (16 - 18)  SpO2: 98% (21 Mar 2018 09:49) (95% - 98%)    I&O's Detail    20 Mar 2018 07:01  -  21 Mar 2018 07:00  --------------------------------------------------------  IN:  Total IN: 0 mL    OUT:    Other: 1000 mL  Total OUT: 1000 mL    Total NET: -1000 mL      21 Mar 2018 07:01  -  21 Mar 2018 12:33  --------------------------------------------------------  IN:    Other: 1000 mL  Total IN: 1000 mL    OUT:  Total OUT: 0 mL    Total NET: 1000 mL                                8.3    6.6   )-----------( 432      ( 21 Mar 2018 06:04 )             25.5     03-21    130<L>  |  93<L>  |  26.0<H>  ----------------------------<  138<H>  4.3   |  24.0  |  4.27<H>    Ca    7.8<L>      21 Mar 2018 06:04  Phos  4.8     03-20    TPro  6.0<L>  /  Alb  2.1<L>  /  TBili  0.3<L>  /  DBili  x   /  AST  62<H>  /  ALT  39<H>  /  AlkPhos  121<H>  03-20          CAPILLARY BLOOD GLUCOSE      POCT Blood Glucose.: 156 mg/dL (21 Mar 2018 12:30)  POCT Blood Glucose.: 162 mg/dL (21 Mar 2018 08:24)  POCT Blood Glucose.: 160 mg/dL (20 Mar 2018 21:48)  POCT Blood Glucose.: 174 mg/dL (20 Mar 2018 17:04)      Culture - Body Fluid with Gram Stain (collected 20 Mar 2018 12:48)  Source: .Body Fluid Pleural Fluid  Gram Stain (20 Mar 2018 13:30):    No White blood cells    No organisms seen        Physical Examination:    Constitutional: Elderly female NAD, laying in bed  HEENT: PERRLA, EOMI ; mild conjunctival pallor, moist oral mucosa  Respiratory: mild crackles anterior lower chest b/l, no wheezing/rales;     Cardiovascular: S1 and S2, RRR, no murmurs appreciated  Gastrointestinal: BS+ normoactive, soft, ND, NTTP   Extremities: trace edema of lower ext b/l;    Vascular: 2+ peripheral pulses DP  Neurological: AAO x 3, no focal deficits  Musculoskeletal: 5/5 strength b/l upper and lower extremities   Skin: No rashes noted

## 2018-03-21 NOTE — PROGRESS NOTE ADULT - PROBLEM SELECTOR PLAN 2
probable NSAID induced gastropathy, possibly erosive or hemorrhagic gastritis or PUD but stable off NSAIDS and on pantoprazole BID

## 2018-03-21 NOTE — PROGRESS NOTE ADULT - PROBLEM SELECTOR PLAN 1
Bilateral pleural effusions and pericardial effusion, noted tte with preserved EF, likely due to worsening renal failure but unclear if component of HFPEF. Now improving clincally after HD sessions, worsening pleural effusions on CXR,  -TTE shows preserved EF and moderate pericardial effusion   -c/w supplemental o2 as needed   -c/w colchicine 0.6mg po bid for presumed pericarditis   -Hemodialysis as per nephrology recommendations.  - s/p R thoracocentesis on 3/20 with 1100 cc removed  - L thoracocentesis planned for today Bilateral pleural effusions and pericardial effusion, noted tte with preserved EF, likely due to worsening renal failure but unclear if component of HFPEF. Now improving clincally after HD sessions, worsening pleural effusions on CXR,  -TTE shows preserved EF and moderate pericardial effusion   -c/w supplemental o2 as needed   -c/w colchicine 0.6mg po bid for presumed Uremic  pericarditis   -Hemodialysis as per nephrology recommendations.  - s/p R thoracocentesis on 3/20 with 1100 cc removed  - L thoracocentesis done 3/21

## 2018-03-21 NOTE — PROGRESS NOTE ADULT - SUBJECTIVE AND OBJECTIVE BOX
NEPHROLOGY INTERVAL HPI/OVERNIGHT EVENTS:  pt clinically stable  generalized weakness  tolerated HD yesterday    MEDICATIONS  (STANDING):  artificial  tears Solution 1 Drop(s) Both EYES every 12 hours  aspirin enteric coated 81 milliGRAM(s) Oral daily  carvedilol 12.5 milliGRAM(s) Oral every 12 hours  cloNIDine 0.1 milliGRAM(s) Oral two times a day  colchicine 0.6 milliGRAM(s) Oral two times a day  dextrose 5%. 1000 milliLiter(s) (50 mL/Hr) IV Continuous <Continuous>  dextrose 50% Injectable 12.5 Gram(s) IV Push once  dextrose 50% Injectable 25 Gram(s) IV Push once  dextrose 50% Injectable 25 Gram(s) IV Push once  docusate sodium 100 milliGRAM(s) Oral two times a day  furosemide    Tablet 20 milliGRAM(s) Oral daily  hydrALAZINE 25 milliGRAM(s) Oral every 8 hours  insulin lispro (HumaLOG) corrective regimen sliding scale   SubCutaneous three times a day before meals  insulin lispro (HumaLOG) corrective regimen sliding scale   SubCutaneous at bedtime  metoclopramide Injectable 10 milliGRAM(s) IV Push every 12 hours  pantoprazole    Tablet 40 milliGRAM(s) Oral two times a day  polyethylene glycol 3350 17 Gram(s) Oral daily  PPD  5 Tuberculin Unit(s) Injectable 5 Unit(s) IntraDermal once  senna 2 Tablet(s) Oral at bedtime  simvastatin 40 milliGRAM(s) Oral at bedtime    MEDICATIONS  (PRN):  acetaminophen   Tablet 650 milliGRAM(s) Oral every 6 hours PRN For Temp greater than 38 C (100.4 F)  acetaminophen   Tablet. 650 milliGRAM(s) Oral every 6 hours PRN Moderate Pain (4 - 6)  dextrose Gel 1 Dose(s) Oral once PRN Blood Glucose LESS THAN 70 milliGRAM(s)/deciliter  glucagon  Injectable 1 milliGRAM(s) IntraMuscular once PRN Glucose LESS THAN 70 milligrams/deciliter  hydrALAZINE Injectable 20 milliGRAM(s) IV Push every 6 hours PRN SBP > 160  ondansetron Injectable 4 milliGRAM(s) IV Push every 6 hours PRN Nausea      Allergies    No Known Allergies          Vital Signs Last 24 Hrs  T(C): 36.7 (21 Mar 2018 10:05), Max: 37 (20 Mar 2018 15:00)  T(F): 98 (21 Mar 2018 10:05), Max: 98.6 (20 Mar 2018 15:00)  HR: 66 (21 Mar 2018 09:49) (66 - 76)  BP: 144/78 (21 Mar 2018 09:49) (144/70 - 154/82)  BP(mean): --  RR: 18 (21 Mar 2018 09:49) (16 - 18)  SpO2: 98% (21 Mar 2018 09:49) (94% - 98%)    PHYSICAL EXAM:  GENERAL: Tired  HEAD:  Atraumatic, Normocephalic  EYES: EOMI, PERRLA  NECK: Supple, No JVD  NERVOUS SYSTEM:  Alert & Oriented X3,  intact and symmetric  CHEST/LUNG: Diminished BS bilaterally (L > R)   HEART: Regular rate and rhythm; No murmurs, rubs, or gallops  ABDOMEN: Soft, +BS  EXTREMITIES:  2+ Peripheral Pulses, tr edema; R femoral catheter  SKIN: No rashes or lesions    LABS:                        8.3    6.6   )-----------( 432      ( 21 Mar 2018 06:04 )             25.5   Hemoglobin: 9.3 g/dL (03.20.18 @ 13:09)      03-21    130<L>  |  93<L>  |  26.0<H>  ----------------------------<  138<H>  4.3   |  24.0  |  4.27<H>    Ca    7.8<L>      21 Mar 2018 06:04  Phos  4.8     03-20    TPro  6.0<L>  /  Alb  2.1<L>  /  TBili  0.3<L>  /  DBili  x   /  AST  62<H>  /  ALT  39<H>  /  AlkPhos  121<H>  03-20        Vitamin D, 25-Hydroxy: 16.0 ng/mL (03-20 @ 19:39)  Intact PTH: 175 pg/mL (03-20 @ 19:34)      RADIOLOGY & ADDITIONAL TESTS:  < from: Xray Chest 2 Views PA/Lat (03.20.18 @ 19:13) >   EXAM:  XR CHEST PA LAT 2V                          PROCEDURE DATE:  03/20/2018          INTERPRETATION:  Chest, PA and lateral views    HISTORY: Follow-up pleural effusion status post paracentesis    Comparison: 3/20    IMPRESSION:    Support Devices: None  Heart: Cardiomegaly  Mediastinum: Normal in contour  Lungs/Airways: Moderate to large left and small right pleural effusion   with or without underlying airspace disease grossly stable. No   significant pneumothorax.  Musculoskeletal: Normal          < end of copied text >

## 2018-03-21 NOTE — PROGRESS NOTE ADULT - SUBJECTIVE AND OBJECTIVE BOX
PRE OPERATIVE NOTE    Pre-op Diagnosis: CKD requiring HD  Procedure: Tunneled HD catheter placement  Surgeon: Dr Marsh    Consent to be obtained by MD in periop holding area  PAST MEDICAL & SURGICAL HISTORY:  HLD (hyperlipidemia)  HTN (hypertension)  DM (diabetes mellitus)  No significant past surgical history    Allergies  No Known Allergies    Daily Weight in k.1 (21 Mar 2018 00:00)    Vital Signs Last 24 Hrs  T(C): 36.9 (21 Mar 2018 06:23), Max: 37 (20 Mar 2018 15:00)  T(F): 98.4 (21 Mar 2018 06:23), Max: 98.6 (20 Mar 2018 15:00)  HR: 76 (21 Mar 2018 06:23) (70 - 76)  BP: 152/72 (21 Mar 2018 06:23) (144/70 - 154/82)  BP(mean): --  RR: 18 (21 Mar 2018 06:23) (16 - 18)  SpO2: 95% (21 Mar 2018 06:23) (94% - 99%)    MEDICATIONS  (STANDING):  artificial  tears Solution 1 Drop(s) Both EYES every 12 hours  aspirin enteric coated 81 milliGRAM(s) Oral daily  carvedilol 12.5 milliGRAM(s) Oral every 12 hours  cloNIDine 0.1 milliGRAM(s) Oral two times a day  colchicine 0.6 milliGRAM(s) Oral two times a day  dextrose 5%. 1000 milliLiter(s) (50 mL/Hr) IV Continuous <Continuous>  dextrose 50% Injectable 12.5 Gram(s) IV Push once  dextrose 50% Injectable 25 Gram(s) IV Push once  dextrose 50% Injectable 25 Gram(s) IV Push once  docusate sodium 100 milliGRAM(s) Oral two times a day  furosemide    Tablet 20 milliGRAM(s) Oral daily  hydrALAZINE 25 milliGRAM(s) Oral every 8 hours  insulin lispro (HumaLOG) corrective regimen sliding scale   SubCutaneous three times a day before meals  insulin lispro (HumaLOG) corrective regimen sliding scale   SubCutaneous at bedtime  metoclopramide Injectable 10 milliGRAM(s) IV Push every 12 hours  pantoprazole    Tablet 40 milliGRAM(s) Oral two times a day  polyethylene glycol 3350 17 Gram(s) Oral daily  PPD  5 Tuberculin Unit(s) Injectable 5 Unit(s) IntraDermal once  senna 2 Tablet(s) Oral at bedtime  simvastatin 40 milliGRAM(s) Oral at bedtime    MEDICATIONS  (PRN):  acetaminophen   Tablet 650 milliGRAM(s) Oral every 6 hours PRN For Temp greater than 38 C (100.4 F)  acetaminophen   Tablet. 650 milliGRAM(s) Oral every 6 hours PRN Moderate Pain (4 - 6)  dextrose Gel 1 Dose(s) Oral once PRN Blood Glucose LESS THAN 70 milliGRAM(s)/deciliter  glucagon  Injectable 1 milliGRAM(s) IntraMuscular once PRN Glucose LESS THAN 70 milligrams/deciliter  hydrALAZINE Injectable 20 milliGRAM(s) IV Push every 6 hours PRN SBP > 160  ondansetron Injectable 4 milliGRAM(s) IV Push every 6 hours PRN Nausea                            8.3    6.6   )-----------( 432      ( 21 Mar 2018 06:04 )             25.5     03-21    130<L>  |  93<L>  |  26.0<H>  ----------------------------<  138<H>  4.3   |  24.0  |  4.27<H>    Ca    7.8<L>      21 Mar 2018 06:04  Phos  4.8     03-20    TPro  6.0<L>  /  Alb  2.1<L>  /  TBili  0.3<L>  /  DBili  x   /  AST  62<H>  /  ALT  39<H>  /  AlkPhos  121<H>  03-20      CAPILLARY BLOOD GLUCOSE      POCT Blood Glucose.: 162 mg/dL (21 Mar 2018 08:24)    A/P: 57y Female planned for above procedure  NPO past midnight, except medications  carvedilol  cloNIDine  furosemide    Tablet  hydrALAZINE  hydrALAZINE Injectable PRN  HD to follow permcath placement

## 2018-03-21 NOTE — PROGRESS NOTE ADULT - PROBLEM SELECTOR PLAN 3
-  Diltiazem discontinued, patient switched from amiodarone drip, to po amiodarone.    -contiue po amiodarone and metoprolol;     -Cardiology note appreciated. -No further episodes of AFib  -no AC at this time until GI work up completed  [facility] Cardiology following  Rates well controlled on b blocker

## 2018-03-21 NOTE — PROGRESS NOTE ADULT - PROBLEM SELECTOR PLAN 4
-No further episodes of AFib  -no AC at this time until GI work up completed Continue HD  Cardiology and Nephrology following  -continue colchicine

## 2018-03-21 NOTE — PROGRESS NOTE ADULT - ATTENDING COMMENTS
I have seen and examined the patient, reviewed the chart, labs and diagnostics and I agree with assessment and plan as above with resident team. note edited as above where required     S/P  Diagnostic and therapeutic pleurocentesis done Rt sided, with the removal of 1100 CC pleural fluid 3/20, and then had pleurocentesis done 3/21 on left sided. Significant improvement in breathing after B/L Pleural tap. Appreciate IR assistance and recommendations  planning for Perma cath placement on Thursday for HD access    spoke with Cardiology, they are planning for cardiac cath on Friday

## 2018-03-21 NOTE — PROGRESS NOTE ADULT - SUBJECTIVE AND OBJECTIVE BOX
[facility]                                                        Office: [address] [place] [address]                                                       Telephone: [phone]. Fax:[phone]      CARDIOLOGY PROGRESS NOTE   ([facility])    Subjective: Patient seen and examined at bedside.  Denies chest pain, shortness of breath, palpitations, dizziness, nausea, bleeding.    CURRENT MEDICATIONS  carvedilol 12.5 milliGRAM(s) Oral every 12 hours  cloNIDine 0.1 milliGRAM(s) Oral two times a day  furosemide    Tablet 20 milliGRAM(s) Oral daily  hydrALAZINE 25 milliGRAM(s) Oral every 8 hours  hydrALAZINE Injectable 20 milliGRAM(s) IV Push every 6 hours PRN  acetaminophen   Tablet 650 milliGRAM(s) Oral every 6 hours PRN  acetaminophen   Tablet. 650 milliGRAM(s) Oral every 6 hours PRN  metoclopramide Injectable 10 milliGRAM(s) IV Push every 12 hours  ondansetron Injectable 4 milliGRAM(s) IV Push every 6 hours PRN    docusate sodium 100 milliGRAM(s) Oral two times a day  pantoprazole    Tablet 40 milliGRAM(s) Oral two times a day  polyethylene glycol 3350 17 Gram(s) Oral daily  senna 2 Tablet(s) Oral at bedtime    colchicine 0.6 milliGRAM(s) Oral two times a day  dextrose 50% Injectable 12.5 Gram(s) IV Push once  dextrose 50% Injectable 25 Gram(s) IV Push once  dextrose 50% Injectable 25 Gram(s) IV Push once  dextrose Gel 1 Dose(s) Oral once PRN  glucagon  Injectable 1 milliGRAM(s) IntraMuscular once PRN  insulin lispro (HumaLOG) corrective regimen sliding scale   SubCutaneous three times a day before meals  insulin lispro (HumaLOG) corrective regimen sliding scale   SubCutaneous at bedtime  simvastatin 40 milliGRAM(s) Oral at bedtime    artificial  tears Solution 1 Drop(s) Both EYES every 12 hours  aspirin enteric coated 81 milliGRAM(s) Oral daily  calcium acetate 667 milliGRAM(s) Oral three times a day with meals  dextrose 5%. 1000 milliLiter(s) IV Continuous <Continuous>  epoetin gian Injectable 66530 Unit(s) IV Push <User Schedule>  ergocalciferol 65224 Unit(s) Oral every week  PPD  5 Tuberculin Unit(s) Injectable 5 Unit(s) IntraDermal once    	  TELEMETRY:   Vitals:  T(C): 36.7 (03-21-18 @ 10:05), Max: 37 (03-20-18 @ 15:00)  HR: 66 (03-21-18 @ 09:49) (66 - 76)  BP: 144/78 (03-21-18 @ 09:49) (144/70 - 154/82)  RR: 18 (03-21-18 @ 09:49) (16 - 18)  SpO2: 98% (03-21-18 @ 09:49) (94% - 98%)  Wt(kg): --  I&O's Summary    20 Mar 2018 07:01  -  21 Mar 2018 07:00  --------------------------------------------------------  IN: 0 mL / OUT: 1000 mL / NET: -1000 mL        Daily T(C): 36.7 (03-21-18 @ 10:05), Max: 37 (03-20-18 @ 15:00)  HR: 66 (03-21-18 @ 09:49) (66 - 76)  BP: 144/78 (03-21-18 @ 09:49) (144/70 - 154/82)  RR: 18 (03-21-18 @ 09:49) (16 - 18)  SpO2: 98% (03-21-18 @ 09:49) (94% - 98%)  Wt(kg): --    Daily     PHYSICAL EXAM:  Appearance: Normal	  HEENT:   Normal oral mucosa, PERRL, EOMI	  Lymphatic: No lymphadenopathy  Cardiovascular: Normal S1 S2, No JVD, No murmurs, No edema  Respiratory: Lungs clear to auscultation	  Psychiatry: A & O x 3, Mood & affect appropriate  Gastrointestinal:  Soft, Non-tender, + BS	  Skin: No rashes, No ecchymoses, No cyanosis  Neurologic: Non-focal  Extremities: Normal range of motion, No clubbing, cyanosis or edema  Vascular: Peripheral pulses palpable 2+ bilaterally      ECG (tracing reviewed by me):  	    DIAGNOSTIC TESTING:  Echocardiogram (images reviewed by me):  Catheterization:  Stress Test:    CXR (image reviewed by me):  OTHER: 	    LABS:	 	  CARDIAC MARKERS:                                  8.3    6.6   )-----------( 432      ( 21 Mar 2018 06:04 )             25.5     03-21    130<L>  |  93<L>  |  26.0<H>  ----------------------------<  138<H>  4.3   |  24.0  |  4.27<H>    Ca    7.8<L>      21 Mar 2018 06:04  Phos  4.8     03-20    TPro  6.0<L>  /  Alb  2.1<L>  /  TBili  0.3<L>  /  DBili  x   /  AST  62<H>  /  ALT  39<H>  /  AlkPhos  121<H>  03-20    BNP:   Lipid Profile:   HgA1c:   TSH: [facility]                                                        Office: [address] [place] [address]                                                       Telephone: [phone]. Fax:[phone]      CARDIOLOGY PROGRESS NOTE   ([facility])    Subjective: Patient seen and examined at bedside.  Denies chest pain, shortness of breath, palpitations, dizziness, bleeding. Complains of nausea. With use of     CURRENT MEDICATIONS  carvedilol 12.5 milliGRAM(s) Oral every 12 hours  cloNIDine 0.1 milliGRAM(s) Oral two times a day  furosemide    Tablet 20 milliGRAM(s) Oral daily  hydrALAZINE 25 milliGRAM(s) Oral every 8 hours  hydrALAZINE Injectable 20 milliGRAM(s) IV Push every 6 hours PRN  acetaminophen   Tablet 650 milliGRAM(s) Oral every 6 hours PRN  acetaminophen   Tablet. 650 milliGRAM(s) Oral every 6 hours PRN  metoclopramide Injectable 10 milliGRAM(s) IV Push every 12 hours  ondansetron Injectable 4 milliGRAM(s) IV Push every 6 hours PRN  docusate sodium 100 milliGRAM(s) Oral two times a day  pantoprazole    Tablet 40 milliGRAM(s) Oral two times a day  polyethylene glycol 3350 17 Gram(s) Oral daily  senna 2 Tablet(s) Oral at bedtime  colchicine 0.6 milliGRAM(s) Oral two times a day  dextrose 50% Injectable 12.5 Gram(s) IV Push once  dextrose 50% Injectable 25 Gram(s) IV Push once  dextrose 50% Injectable 25 Gram(s) IV Push once  dextrose Gel 1 Dose(s) Oral once PRN  glucagon  Injectable 1 milliGRAM(s) IntraMuscular once PRN  insulin lispro (HumaLOG) corrective regimen sliding scale   SubCutaneous three times a day before meals  insulin lispro (HumaLOG) corrective regimen sliding scale   SubCutaneous at bedtime  simvastatin 40 milliGRAM(s) Oral at bedtime  rtificial  tears Solution 1 Drop(s) Both EYES every 12 hours  aspirin enteric coated 81 milliGRAM(s) Oral daily  calcium acetate 667 milliGRAM(s) Oral three times a day with meals  dextrose 5%. 1000 milliLiter(s) IV Continuous <Continuous>  epoetin gian Injectable 73115 Unit(s) IV Push <User Schedule>  ergocalciferol 20386 Unit(s) Oral every week  PPD  5 Tuberculin Unit(s) Injectable 5 Unit(s) IntraDermal once    	  TELEMETRY:  SR  Vitals:  T(C): 36.7 (18 @ 10:05), Max: 37 (18 @ 15:00)  HR: 66 (18 @ 09:49) (66 - 76)  BP: 144/78 (18 @ 09:49) (144/70 - 154/82)  RR: 18 (18 @ 09:49) (16 - 18)  SpO2: 98% (18 @ 09:49) (94% - 98%)  Wt(kg): --  I&O's Summary    20 Mar 2018 07:01  -  21 Mar 2018 07:00  --------------------------------------------------------  IN: 0 mL / OUT: 1000 mL / NET: -1000 mL      PHYSICAL EXAM:  Appearance: Normal	  HEENT:   Normal oral mucosa, PERRL, EOMI	  Lymphatic: No lymphadenopathy  Cardiovascular: Normal S1 S2, No JVD, II/VI systolic murmur, No edema  Respiratory: pleural rub/crackles on right, decreased breath sounds mid to base on left	  Psychiatry: A & O x 3, Mood & affect appropriate  Gastrointestinal:  Soft, Non-tender, + BS	  Skin: No rashes, No ecchymoses, No cyanosis  Neurologic: Non-focal  Extremities: Normal range of motion, No clubbing, cyanosis or edema  Vascular: Peripheral pulses palpable 2+ bilaterally, warm      DIAGNOSTIC TESTIN.3    6.6   )-----------( 432      ( 21 Mar 2018 06:04 )             25.5     03-21    130<L>  |  93<L>  |  26.0<H>  ----------------------------<  138<H>  4.3   |  24.0  |  4.27<H>    Ca    7.8<L>      21 Mar 2018 06:04  Phos  4.8     03-20    TPro  6.0<L>  /  Alb  2.1<L>  /  TBili  0.3<L>  /  DBili  x   /  AST  62<H>  /  ALT  39<H>  /  AlkPhos  121<H>  20

## 2018-03-21 NOTE — PROGRESS NOTE ADULT - ASSESSMENT
Assessment and Plan:   · Assessment		  Ms. Elizabeth is a 58 yo Georgian speaking female with hx of hypertension, hyperlipidemia, type 2 diabetes, with 1 month hx of vomiting and abdominal pain, with NSAID use.  Developed pleuritic chest pain, worsened in supine position and with deep breaths.  Chief complaint of N/V and dark stools noted to be in acute pulm edema/pericardial effusion with ARF, underwent US guided Right thoracentesis, mild discomfort noted, seen by Team and ordered Tylenol.  Also complained of no BM x 3 days, added sorbitol and informed PCP to follow.   Also cardiac cath cancelled yesterday due to respiratory distress.  NO EDG until cleared, GI following.        1. pleural effusions - s/p right sided thoracentesis yesterday, for left sided thoracentesis today.   2. abdominal pain/nausea/vomiting- being evaluated by GI, PPI, awaiting endoscopy when stable from cardiopulmonary standpoint.   3. chest pain -  Her troponin is elevated, but CK is normal.  This is not consistent with acute coronary syndrome.  May have had demand ischemia in setting of blood loss anemia.  Probable underlying gastritis with concomitant pericarditis (pericardial rub, etc.).   Now on colchicine. Keep Hb > 9.  Monitor H& H.  Today 9.3.  Given overall picture, elevated troponin, continued chest pain, needs ischemia evaluation.  Cath rescheduled for Friday.   4. anemia - probably chronic or subacute .  GI workup.  CKD  Keep Hb > 9  5 hypertension- elevated, metoprolol to carvedilol 12.5 mg bid, volume removal start with lasix 20 mg daily.  Ct with hydralazine and clonidine.   550 ml urine output this afternoon      5. moderate pericardial effusion - hemodynamically stable, chronic.  6. acute on chronic renal insufficiency -   HD per renal, for permacath   8. paroxysmal atrial fibrillation - newly diagnosed; no AC for now given anemia, will need GI clearance; appreciate EP input regarding KELLI closure candidacy. Will perform GERALD prior to discharge to assess KELLI anatomy and if suitable for KELLI closure.    Thank you for allowing me to participate in care of your patient.   Will follow  D/W primary team

## 2018-03-22 LAB
ANION GAP SERPL CALC-SCNC: 16 MMOL/L — SIGNIFICANT CHANGE UP (ref 5–17)
BASOPHILS # BLD AUTO: 0 K/UL — SIGNIFICANT CHANGE UP (ref 0–0.2)
BASOPHILS NFR BLD AUTO: 0.5 % — SIGNIFICANT CHANGE UP (ref 0–2)
BUN SERPL-MCNC: 37 MG/DL — HIGH (ref 8–20)
CALCIUM SERPL-MCNC: 8 MG/DL — LOW (ref 8.6–10.2)
CHLORIDE SERPL-SCNC: 89 MMOL/L — LOW (ref 98–107)
CO2 SERPL-SCNC: 22 MMOL/L — SIGNIFICANT CHANGE UP (ref 22–29)
CREAT SERPL-MCNC: 5.21 MG/DL — HIGH (ref 0.5–1.3)
EOSINOPHIL # BLD AUTO: 0.4 K/UL — SIGNIFICANT CHANGE UP (ref 0–0.5)
EOSINOPHIL NFR BLD AUTO: 5.1 % — SIGNIFICANT CHANGE UP (ref 0–6)
GLUCOSE BLDC GLUCOMTR-MCNC: 151 MG/DL — HIGH (ref 70–99)
GLUCOSE BLDC GLUCOMTR-MCNC: 165 MG/DL — HIGH (ref 70–99)
GLUCOSE BLDC GLUCOMTR-MCNC: 184 MG/DL — HIGH (ref 70–99)
GLUCOSE BLDC GLUCOMTR-MCNC: 192 MG/DL — HIGH (ref 70–99)
GLUCOSE BLDC GLUCOMTR-MCNC: 229 MG/DL — HIGH (ref 70–99)
GLUCOSE SERPL-MCNC: 154 MG/DL — HIGH (ref 70–115)
HCT VFR BLD CALC: 26.9 % — LOW (ref 37–47)
HGB BLD-MCNC: 8.9 G/DL — LOW (ref 12–16)
LYMPHOCYTES # BLD AUTO: 0.7 K/UL — LOW (ref 1–4.8)
LYMPHOCYTES # BLD AUTO: 8.1 % — LOW (ref 20–55)
MCHC RBC-ENTMCNC: 29 PG — SIGNIFICANT CHANGE UP (ref 27–31)
MCHC RBC-ENTMCNC: 33.1 G/DL — SIGNIFICANT CHANGE UP (ref 32–36)
MCV RBC AUTO: 87.6 FL — SIGNIFICANT CHANGE UP (ref 81–99)
MONOCYTES # BLD AUTO: 1 K/UL — HIGH (ref 0–0.8)
MONOCYTES NFR BLD AUTO: 11.7 % — HIGH (ref 3–10)
NEUTROPHILS # BLD AUTO: 6 K/UL — SIGNIFICANT CHANGE UP (ref 1.8–8)
NEUTROPHILS NFR BLD AUTO: 74.4 % — HIGH (ref 37–73)
NON-GYN CYTOLOGY SPEC: SIGNIFICANT CHANGE UP
PLATELET # BLD AUTO: 514 K/UL — HIGH (ref 150–400)
POTASSIUM SERPL-MCNC: 4 MMOL/L — SIGNIFICANT CHANGE UP (ref 3.5–5.3)
POTASSIUM SERPL-SCNC: 4 MMOL/L — SIGNIFICANT CHANGE UP (ref 3.5–5.3)
RBC # BLD: 3.07 M/UL — LOW (ref 4.4–5.2)
RBC # FLD: 13.4 % — SIGNIFICANT CHANGE UP (ref 11–15.6)
SODIUM SERPL-SCNC: 127 MMOL/L — LOW (ref 135–145)
WBC # BLD: 8.1 K/UL — SIGNIFICANT CHANGE UP (ref 4.8–10.8)
WBC # FLD AUTO: 8.1 K/UL — SIGNIFICANT CHANGE UP (ref 4.8–10.8)

## 2018-03-22 PROCEDURE — 36558 INSERT TUNNELED CV CATH: CPT

## 2018-03-22 PROCEDURE — 76937 US GUIDE VASCULAR ACCESS: CPT | Mod: 26

## 2018-03-22 PROCEDURE — 99152 MOD SED SAME PHYS/QHP 5/>YRS: CPT

## 2018-03-22 PROCEDURE — 99233 SBSQ HOSP IP/OBS HIGH 50: CPT | Mod: GC

## 2018-03-22 RX ADMIN — Medication 100 MILLIGRAM(S): at 18:07

## 2018-03-22 RX ADMIN — Medication 20 MILLIGRAM(S): at 05:27

## 2018-03-22 RX ADMIN — Medication 20 MILLIGRAM(S): at 06:02

## 2018-03-22 RX ADMIN — Medication 1: at 18:07

## 2018-03-22 RX ADMIN — Medication 1: at 08:41

## 2018-03-22 RX ADMIN — CARVEDILOL PHOSPHATE 12.5 MILLIGRAM(S): 80 CAPSULE, EXTENDED RELEASE ORAL at 05:27

## 2018-03-22 RX ADMIN — Medication 25 MILLIGRAM(S): at 05:27

## 2018-03-22 RX ADMIN — Medication 1: at 15:08

## 2018-03-22 RX ADMIN — Medication 0.6 MILLIGRAM(S): at 18:07

## 2018-03-22 RX ADMIN — Medication 0.6 MILLIGRAM(S): at 05:27

## 2018-03-22 RX ADMIN — ERYTHROPOIETIN 10000 UNIT(S): 10000 INJECTION, SOLUTION INTRAVENOUS; SUBCUTANEOUS at 13:37

## 2018-03-22 RX ADMIN — Medication 1 DROP(S): at 05:29

## 2018-03-22 RX ADMIN — SENNA PLUS 2 TABLET(S): 8.6 TABLET ORAL at 21:43

## 2018-03-22 RX ADMIN — SIMVASTATIN 40 MILLIGRAM(S): 20 TABLET, FILM COATED ORAL at 21:43

## 2018-03-22 RX ADMIN — Medication 1 DROP(S): at 18:07

## 2018-03-22 RX ADMIN — Medication 667 MILLIGRAM(S): at 12:13

## 2018-03-22 RX ADMIN — Medication 81 MILLIGRAM(S): at 12:13

## 2018-03-22 RX ADMIN — PANTOPRAZOLE SODIUM 40 MILLIGRAM(S): 20 TABLET, DELAYED RELEASE ORAL at 18:10

## 2018-03-22 RX ADMIN — Medication 25 MILLIGRAM(S): at 21:43

## 2018-03-22 RX ADMIN — Medication 10 MILLIGRAM(S): at 05:29

## 2018-03-22 RX ADMIN — Medication 650 MILLIGRAM(S): at 12:14

## 2018-03-22 RX ADMIN — Medication 100 MILLIGRAM(S): at 05:27

## 2018-03-22 RX ADMIN — Medication 0.1 MILLIGRAM(S): at 05:27

## 2018-03-22 RX ADMIN — Medication 667 MILLIGRAM(S): at 18:07

## 2018-03-22 RX ADMIN — CARVEDILOL PHOSPHATE 12.5 MILLIGRAM(S): 80 CAPSULE, EXTENDED RELEASE ORAL at 18:07

## 2018-03-22 RX ADMIN — Medication 0.1 MILLIGRAM(S): at 18:07

## 2018-03-22 RX ADMIN — PANTOPRAZOLE SODIUM 40 MILLIGRAM(S): 20 TABLET, DELAYED RELEASE ORAL at 05:29

## 2018-03-22 NOTE — PROGRESS NOTE ADULT - ATTENDING COMMENTS
I have seen and examined the patient, reviewed the chart, labs and diagnostics and I agree with assessment and plan as above with resident team. note edited as above where required     S/P  Diagnostic and therapeutic pleurocentesis done Rt sided, with the removal of 1100 CC pleural fluid 3/20, and then had pleurocentesis done 3/21 on left sided with removal of 1 L. Significant improvement in breathing after B/L Pleural tap.   Perma cath placement Today    will discuss with Nephrology regarding D/C Colchicine    planning for cardiac cath in am  NPO past mid nite

## 2018-03-22 NOTE — PROGRESS NOTE ADULT - PROBLEM SELECTOR PLAN 2
baseline ckd 3 now in ARF with pulm edema/ uremia sx/ uremic pericarditis requiring dialysis  for the 1 st time during this admission  -daily weight   -Acute hepatitis panel negative,   -ppd at 48 hours 0mm negative thus far   PERMACATH 3/22 by IR

## 2018-03-22 NOTE — PROGRESS NOTE ADULT - SUBJECTIVE AND OBJECTIVE BOX
PGY-2 Medicine Progress Note    CARMEN  MRN: [id number]    BRIEF HOSPITAL COURSE:   Patient is a 57y old  Female who presents with a chief complaint of N/V and dark stools noted to be in acute pulm edema/pericardial effusion with ARF requiring HD    Events last 24 hours:   plan for Permacath placement today;  patient npo after midnight;    BP elevated this am dn=568//94, given dose of Hydralazine 20mg iv prn order;      weight =70.6kg this am;   4 voids ovfernight;      MEDICATIONS  (STANDING):  artificial  tears Solution 1 Drop(s) Both EYES every 12 hours  aspirin enteric coated 81 milliGRAM(s) Oral daily  calcium acetate 667 milliGRAM(s) Oral three times a day with meals  carvedilol 12.5 milliGRAM(s) Oral every 12 hours  cloNIDine 0.1 milliGRAM(s) Oral two times a day  colchicine 0.6 milliGRAM(s) Oral two times a day  dextrose 5%. 1000 milliLiter(s) (50 mL/Hr) IV Continuous <Continuous>  dextrose 50% Injectable 12.5 Gram(s) IV Push once  dextrose 50% Injectable 25 Gram(s) IV Push once  dextrose 50% Injectable 25 Gram(s) IV Push once  docusate sodium 100 milliGRAM(s) Oral two times a day  epoetin gian Injectable 46963 Unit(s) IV Push <User Schedule>  ergocalciferol 30688 Unit(s) Oral every week  furosemide    Tablet 20 milliGRAM(s) Oral daily  hydrALAZINE 25 milliGRAM(s) Oral every 8 hours  insulin lispro (HumaLOG) corrective regimen sliding scale   SubCutaneous three times a day before meals  insulin lispro (HumaLOG) corrective regimen sliding scale   SubCutaneous at bedtime  metoclopramide Injectable 10 milliGRAM(s) IV Push every 12 hours  pantoprazole    Tablet 40 milliGRAM(s) Oral two times a day  polyethylene glycol 3350 17 Gram(s) Oral daily  PPD  5 Tuberculin Unit(s) Injectable 5 Unit(s) IntraDermal once  senna 2 Tablet(s) Oral at bedtime  simvastatin 40 milliGRAM(s) Oral at bedtime    MEDICATIONS  (PRN):  acetaminophen   Tablet 650 milliGRAM(s) Oral every 6 hours PRN For Temp greater than 38 C (100.4 F)  acetaminophen   Tablet. 650 milliGRAM(s) Oral every 6 hours PRN Moderate Pain (4 - 6)  dextrose Gel 1 Dose(s) Oral once PRN Blood Glucose LESS THAN 70 milliGRAM(s)/deciliter  glucagon  Injectable 1 milliGRAM(s) IntraMuscular once PRN Glucose LESS THAN 70 milligrams/deciliter  hydrALAZINE Injectable 20 milliGRAM(s) IV Push every 6 hours PRN SBP > 160  ondansetron Injectable 4 milliGRAM(s) IV Push every 6 hours PRN Nausea    Vital Signs Last 24 Hrs  T(C): 36.7 (22 Mar 2018 05:20), Max: 36.8 (21 Mar 2018 21:01)  T(F): 98 (22 Mar 2018 05:20), Max: 98.2 (21 Mar 2018 21:01)  HR: 79 (22 Mar 2018 05:20) (66 - 79)  BP: 194/94 (22 Mar 2018 05:20) (141/72 - 194/94)  RR: 16 (22 Mar 2018 05:20) (16 - 18)  SpO2: 99% (22 Mar 2018 05:20) (92% - 100%)    I&O's Detail    03-21 @ 07:01  -  03-22 @ 07:00  --------------------------------------------------------  IN: 1480 mL / OUT: 0 mL / NET: 1480 mL                                  8.3    6.6   )-----------( 432      ( 21 Mar 2018 06:04 )             25.5     03-21    130<L>  |  93<L>  |  26.0<H>  ----------------------------<  138<H>  4.3   |  24.0  |  4.27<H>    Ca    7.8<L>      21 Mar 2018 06:04  Phos  4.8     03-20    TPro  6.0<L>  /  Alb  2.1<L>  /  TBili  0.3<L>  /  DBili  x   /  AST  62<H>  /  ALT  39<H>  /  AlkPhos  121<H>  03-20          CAPILLARY BLOOD GLUCOSE      POCT Blood Glucose.: 156 mg/dL (21 Mar 2018 12:30)  POCT Blood Glucose.: 162 mg/dL (21 Mar 2018 08:24)  POCT Blood Glucose.: 160 mg/dL (20 Mar 2018 21:48)  POCT Blood Glucose.: 174 mg/dL (20 Mar 2018 17:04)      Culture - Body Fluid with Gram Stain (collected 20 Mar 2018 12:48)  Source: .Body Fluid Pleural Fluid  Gram Stain (20 Mar 2018 13:30):    No White blood cells    No organisms seen        Physical Examination:    Constitutional: Elderly female NAD, laying in bed  HEENT: PERRLA, EOMI ; mild conjunctival pallor, moist oral mucosa  Respiratory: mild crackles anterior lower chest b/l, no wheezing/rales;     Cardiovascular: S1 and S2, RRR, no murmurs appreciated  Gastrointestinal: BS+ normoactive, soft, ND, NTTP   Extremities: trace edema of lower ext b/l;    Vascular: 2+ peripheral pulses DP  Neurological: AAO x 3, no focal deficits  Musculoskeletal: 5/5 strength b/l upper and lower extremities   Skin: No rashes noted PGY-2 Medicine Progress Note    CARMEN  MRN: [id number]    BRIEF HOSPITAL COURSE:   Patient is a 57y old  Female who presents with a chief complaint of N/V and dark stools noted to be in acute pulm edema/pericardial effusion with ARF requiring HD    Events last 24 hours:   plan for Permacath placement today;  patient npo after midnight;    BP elevated this am bh=336//94, given dose of Hydralazine 20mg iv prn order;      weight =70.6kg this am;   4 voids ovfernight;      MEDICATIONS  (STANDING):  artificial  tears Solution 1 Drop(s) Both EYES every 12 hours  aspirin enteric coated 81 milliGRAM(s) Oral daily  calcium acetate 667 milliGRAM(s) Oral three times a day with meals  carvedilol 12.5 milliGRAM(s) Oral every 12 hours  cloNIDine 0.1 milliGRAM(s) Oral two times a day  colchicine 0.6 milliGRAM(s) Oral two times a day  dextrose 5%. 1000 milliLiter(s) (50 mL/Hr) IV Continuous <Continuous>  dextrose 50% Injectable 12.5 Gram(s) IV Push once  dextrose 50% Injectable 25 Gram(s) IV Push once  dextrose 50% Injectable 25 Gram(s) IV Push once  docusate sodium 100 milliGRAM(s) Oral two times a day  epoetin gian Injectable 11191 Unit(s) IV Push <User Schedule>  ergocalciferol 70978 Unit(s) Oral every week  furosemide    Tablet 20 milliGRAM(s) Oral daily  hydrALAZINE 25 milliGRAM(s) Oral every 8 hours  insulin lispro (HumaLOG) corrective regimen sliding scale   SubCutaneous three times a day before meals  insulin lispro (HumaLOG) corrective regimen sliding scale   SubCutaneous at bedtime  metoclopramide Injectable 10 milliGRAM(s) IV Push every 12 hours  pantoprazole    Tablet 40 milliGRAM(s) Oral two times a day  polyethylene glycol 3350 17 Gram(s) Oral daily  PPD  5 Tuberculin Unit(s) Injectable 5 Unit(s) IntraDermal once  senna 2 Tablet(s) Oral at bedtime  simvastatin 40 milliGRAM(s) Oral at bedtime    MEDICATIONS  (PRN):  acetaminophen   Tablet 650 milliGRAM(s) Oral every 6 hours PRN For Temp greater than 38 C (100.4 F)  acetaminophen   Tablet. 650 milliGRAM(s) Oral every 6 hours PRN Moderate Pain (4 - 6)  dextrose Gel 1 Dose(s) Oral once PRN Blood Glucose LESS THAN 70 milliGRAM(s)/deciliter  glucagon  Injectable 1 milliGRAM(s) IntraMuscular once PRN Glucose LESS THAN 70 milligrams/deciliter  hydrALAZINE Injectable 20 milliGRAM(s) IV Push every 6 hours PRN SBP > 160  ondansetron Injectable 4 milliGRAM(s) IV Push every 6 hours PRN Nausea    Vital Signs Last 24 Hrs  T(C): 36.4 (22 Mar 2018 12:45), Max: 36.8 (21 Mar 2018 21:01)  T(F): 97.6 (22 Mar 2018 12:45), Max: 98.2 (21 Mar 2018 21:01)  HR: 66 (22 Mar 2018 12:45) (66 - 79)  BP: 142/84 (22 Mar 2018 12:45) (138/84 - 194/94)  RR: 18 (22 Mar 2018 12:45) (16 - 18)  SpO2: 97% (22 Mar 2018 12:45) (96% - 99%)    I&O's Detail    03-21 @ 07:01  -  03-22 @ 07:00  --------------------------------------------------------  IN: 1480 mL / OUT: 0 mL / NET: 1480 mL     Lab Results:  CBC Full  -  ( 22 Mar 2018 06:35 )  WBC Count : 8.1 K/uL  Hemoglobin : 8.9 g/dL  Hematocrit : 26.9 %  Platelet Count - Automated : 514 K/uL  Mean Cell Volume : 87.6 fl  Mean Cell Hemoglobin : 29.0 pg  Mean Cell Hemoglobin Concentration : 33.1 g/dL  Auto Neutrophil # : 6.0 K/uL  Auto Lymphocyte # : 0.7 K/uL  Auto Monocyte # : 1.0 K/uL  Auto Eosinophil # : 0.4 K/uL  Auto Basophil # : 0.0 K/uL  Auto Neutrophil % : 74.4 %  Auto Lymphocyte % : 8.1 %  Auto Monocyte % : 11.7 %  Auto Eosinophil % : 5.1 %  Auto Basophil % : 0.5 %                            8.9    8.1   )-----------( 514      ( 22 Mar 2018 06:35 )             26.9     03-22    127<L>  |  89<L>  |  37.0<H>  ----------------------------<  154<H>  4.0   |  22.0  |  5.21<H>    Ca    8.0<L>      22 Mar 2018 06:35                  MICROBIOLOGY/CULTURES:  Culture Results:   No growth at 1 day.  Culture in progress (03-21 @ 11:30)  Culture Results:   No growth at 2 days.  Culture in progress (03-20 @ 12:48)  Culture Results:   <10,000 CFU/ml Gram Positive Cocci in Clusters  <10,000 CFU/ml Gram Positive Rods (03-17 @ 21:37)      RADIOLOGY RESULTS:  Culture - Body Fluid with Gram Stain (collected 20 Mar 2018 12:48)  Source: .Body Fluid Pleural Fluid  Gram Stain (20 Mar 2018 13:30):    No White blood cells    No organisms seen        Physical Examination:    Constitutional: Elderly female NAD, laying in bed  HEENT: PERRLA, EOMI ; mild conjunctival pallor, moist oral mucosa  Respiratory: mild crackles anterior lower chest b/l, no wheezing/rales;     Cardiovascular: S1 and S2, RRR, no murmurs appreciated  Gastrointestinal: BS+ normoactive, soft, ND, NTTP   Extremities: trace edema of lower ext b/l;    Vascular: 2+ peripheral pulses DP  Neurological: AAO x 3, no focal deficits  Musculoskeletal: 5/5 strength b/l upper and lower extremities   Skin: No rashes noted

## 2018-03-22 NOTE — PROGRESS NOTE ADULT - PROBLEM SELECTOR PLAN 7
HBA1C: 7.6   f/s  =   / 156/  189             / 1un/  1un  continue sliding scale;   f/s wnl;    -c/w accuchecks achs tid   -c/w hypoglycemia protocol

## 2018-03-22 NOTE — PROGRESS NOTE ADULT - PROBLEM SELECTOR PLAN 3
-No further episodes of AFib  -no AC at this time until GI work up completed  [facility] following  Rates well controlled on b blocker

## 2018-03-22 NOTE — PROGRESS NOTE ADULT - PROBLEM SELECTOR PLAN 5
likely multifactorial due to worsening acute on chronic renal insufficiency in the setting of CKD,  suspected GI bleed (initially with coffee ground emesis) but occult stool negative and degree of AOCD/ iron deficiency   -s/p 2 units pRBC on admission with improvement in h/h has remained stable   -last colonoscopy /EGD 2017 WNL as per patient  -occult stool neg  -will c/w monitoring h/h   -anemia panel noted with degree iron deficiency   -c/w ppi po bid   -c/w iron sucrose as per nephro and eventual change to ferrous sulfate   -pt could benefit from epogen   -GI f/u noted and appreciated and recommended conservative management at this time and after cardio clearance will likely proceed with endoscopic intervention

## 2018-03-22 NOTE — PROGRESS NOTE ADULT - ASSESSMENT
CRF: +DM  Likely progression to ESRD  Fluid overload (pleural and pericardial effusions)  - HD today after permacath placement    Anemia:  - added MARGI at Hd  - trend H/H (target Hgb=10.0)    RO: added Vitamin D and binders  - trend serum phos

## 2018-03-22 NOTE — PROGRESS NOTE ADULT - ASSESSMENT
57 y.o. F with hx of HTN, HLD, DMII, hyperkalemia, ckd 3 who presented with 3 weeks of postprandial vomiting and abd pain, noted to have ARF, fluid overload with bilateral pleural effusions and pericardial effusion as well as symptomatic anemia likely multifactorial secondary to acute on chronic renal failure and suspected upper GI Bleeding from vomiting/retching and NSAID use. Clinical concern that worsening acute on chronic renal failure possibly caused uremia related sx of N/V, with patients NSAID use and retching suspected GI bleed but occult blood returned negative, for dropping h/h pt received 2 u prbc on admission with appropriate response to therapy. 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 In the interim while being dialyzed the patient had an episode of PAF, s/p cardizem became rate controlled and remained in NSR. EP consulted. Unable to AC the patient due to initial concern for GI bleeding, for which GI consulted and pt pending possible GI intervention when respiratory status improves. Clinical concern for ARF causing volume overload, unclear if HFPEF is a contributing factor.     S/P Hemodialysis on [place] 3/18, 1.6 Liters taken off.  S/p R thoracocentesis on 3/20 with 1100 cc removed  L thoracentesis was planned for 3/21, no updated notes of procedure in chart, will contact ir;      Likely to get Cath on Friday, will f/u with cardio, ordered for npo after midnight placed;    Once cleared by Cardiology patient will get endoscopy by GI, workup suspected upper gi bleed;       BP elevated this am [id number], given dose of Hydralazine 20mg iv prn order at 6am;  continue coreg 12.5q12;      Weight-70.6kg tis am; 57 y.o. F with hx of HTN, HLD, DMII, hyperkalemia, ckd 3 who presented with 3 weeks of postprandial vomiting and abd pain, noted to have ARF, fluid overload with bilateral pleural effusions and pericardial effusion as well as symptomatic anemia likely multifactorial secondary to acute on chronic renal failure and suspected upper GI Bleeding from vomiting/retching and NSAID use. Clinical concern that worsening acute on chronic renal failure possibly caused uremia related sx of N/V, with patients NSAID use and retching suspected GI bleed but occult blood returned negative, for dropping h/h pt received 2 u prbc on admission with appropriate response to therapy. 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 In the interim while being dialyzed the patient had an episode of PAF, s/p cardizem became rate controlled and remained in NSR. EP consulted. Unable to AC the patient due to initial concern for GI bleeding, for which GI consulted and pt pending possible GI intervention when respiratory status improves. Clinical concern for ARF causing volume overload, unclear if HFPEF is a contributing factor.     S/P Hemodialysis on [place] 3/18, 1.6 Liters taken off.  S/p R thoracocentesis on 3/20 with 1100 cc removed  S/Ppleurocentesis done 3/21 on left sided with removal of 1 L.  Likely to get Cath on Friday, will f/u with cardio, ordered for npo after midnight placed;    Once cleared by Cardiology patient will get endoscopy by GI, workup suspected upper gi bleed;       BP elevated this am [id number], given dose of Hydralazine 20mg iv prn order at 6am;  continue coreg 12.5q12;      Weight-70.6kg tis am;

## 2018-03-22 NOTE — PROGRESS NOTE ADULT - PROBLEM SELECTOR PLAN 1
Patient has improving sob, fluid overload is reduced; clinically improved;  continue furosemide 20mg po daily;      -Bilateral pleural effusions and pericardial effusion.  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  - s/p R thoracocentesis on 3/20 with 1100 cc removed  - L thoracocentesis done 3/21

## 2018-03-23 LAB
ANION GAP SERPL CALC-SCNC: 17 MMOL/L — SIGNIFICANT CHANGE UP (ref 5–17)
ANISOCYTOSIS BLD QL: SLIGHT — SIGNIFICANT CHANGE UP
BASOPHILS # BLD AUTO: 0 K/UL — SIGNIFICANT CHANGE UP (ref 0–0.2)
BASOPHILS NFR BLD AUTO: 0.3 % — SIGNIFICANT CHANGE UP (ref 0–2)
BLD GP AB SCN SERPL QL: SIGNIFICANT CHANGE UP
BUN SERPL-MCNC: 24 MG/DL — HIGH (ref 8–20)
CALCIUM SERPL-MCNC: 7.9 MG/DL — LOW (ref 8.6–10.2)
CHLORIDE SERPL-SCNC: 92 MMOL/L — LOW (ref 98–107)
CO2 SERPL-SCNC: 23 MMOL/L — SIGNIFICANT CHANGE UP (ref 22–29)
CREAT SERPL-MCNC: 3.78 MG/DL — HIGH (ref 0.5–1.3)
EOSINOPHIL # BLD AUTO: 0.4 K/UL — SIGNIFICANT CHANGE UP (ref 0–0.5)
EOSINOPHIL NFR BLD AUTO: 5.4 % — SIGNIFICANT CHANGE UP (ref 0–6)
GLUCOSE BLDC GLUCOMTR-MCNC: 158 MG/DL — HIGH (ref 70–99)
GLUCOSE BLDC GLUCOMTR-MCNC: 166 MG/DL — HIGH (ref 70–99)
GLUCOSE BLDC GLUCOMTR-MCNC: 186 MG/DL — HIGH (ref 70–99)
GLUCOSE BLDC GLUCOMTR-MCNC: 209 MG/DL — HIGH (ref 70–99)
GLUCOSE SERPL-MCNC: 167 MG/DL — HIGH (ref 70–115)
HCT VFR BLD CALC: 24.1 % — LOW (ref 37–47)
HGB BLD-MCNC: 7.7 G/DL — LOW (ref 12–16)
HYPOCHROMIA BLD QL: SLIGHT — SIGNIFICANT CHANGE UP
LDH SERPL L TO P-CCNC: 201 U/L — HIGH (ref 98–192)
LYMPHOCYTES # BLD AUTO: 0.7 K/UL — LOW (ref 1–4.8)
LYMPHOCYTES # BLD AUTO: 10.9 % — LOW (ref 20–55)
MACROCYTES BLD QL: SLIGHT — SIGNIFICANT CHANGE UP
MAGNESIUM SERPL-MCNC: 1.8 MG/DL — SIGNIFICANT CHANGE UP (ref 1.6–2.6)
MCHC RBC-ENTMCNC: 27.8 PG — SIGNIFICANT CHANGE UP (ref 27–31)
MCHC RBC-ENTMCNC: 32 G/DL — SIGNIFICANT CHANGE UP (ref 32–36)
MCV RBC AUTO: 87 FL — SIGNIFICANT CHANGE UP (ref 81–99)
MICROCYTES BLD QL: SLIGHT — SIGNIFICANT CHANGE UP
MONOCYTES # BLD AUTO: 0.8 K/UL — SIGNIFICANT CHANGE UP (ref 0–0.8)
MONOCYTES NFR BLD AUTO: 12.5 % — HIGH (ref 3–10)
NEUTROPHILS # BLD AUTO: 4.8 K/UL — SIGNIFICANT CHANGE UP (ref 1.8–8)
NEUTROPHILS NFR BLD AUTO: 70.3 % — SIGNIFICANT CHANGE UP (ref 37–73)
PHOSPHATE SERPL-MCNC: 2.3 MG/DL — LOW (ref 2.4–4.7)
PLAT MORPH BLD: NORMAL — SIGNIFICANT CHANGE UP
PLATELET # BLD AUTO: 364 K/UL — SIGNIFICANT CHANGE UP (ref 150–400)
POIKILOCYTOSIS BLD QL AUTO: SLIGHT — SIGNIFICANT CHANGE UP
POTASSIUM SERPL-MCNC: 4 MMOL/L — SIGNIFICANT CHANGE UP (ref 3.5–5.3)
POTASSIUM SERPL-SCNC: 4 MMOL/L — SIGNIFICANT CHANGE UP (ref 3.5–5.3)
RBC # BLD: 2.77 M/UL — LOW (ref 4.4–5.2)
RBC # FLD: 13.6 % — SIGNIFICANT CHANGE UP (ref 11–15.6)
RBC BLD AUTO: ABNORMAL
SODIUM SERPL-SCNC: 132 MMOL/L — LOW (ref 135–145)
TYPE + AB SCN PNL BLD: SIGNIFICANT CHANGE UP
WBC # BLD: 6.8 K/UL — SIGNIFICANT CHANGE UP (ref 4.8–10.8)
WBC # FLD AUTO: 6.8 K/UL — SIGNIFICANT CHANGE UP (ref 4.8–10.8)

## 2018-03-23 PROCEDURE — 99233 SBSQ HOSP IP/OBS HIGH 50: CPT | Mod: GC

## 2018-03-23 PROCEDURE — 93458 L HRT ARTERY/VENTRICLE ANGIO: CPT | Mod: 26

## 2018-03-23 PROCEDURE — 99232 SBSQ HOSP IP/OBS MODERATE 35: CPT

## 2018-03-23 RX ORDER — HYDRALAZINE HCL 50 MG
5 TABLET ORAL ONCE
Qty: 0 | Refills: 0 | Status: COMPLETED | OUTPATIENT
Start: 2018-03-23 | End: 2018-03-23

## 2018-03-23 RX ADMIN — Medication 25 MILLIGRAM(S): at 21:54

## 2018-03-23 RX ADMIN — Medication 2: at 17:44

## 2018-03-23 RX ADMIN — CARVEDILOL PHOSPHATE 12.5 MILLIGRAM(S): 80 CAPSULE, EXTENDED RELEASE ORAL at 05:29

## 2018-03-23 RX ADMIN — Medication 25 MILLIGRAM(S): at 05:29

## 2018-03-23 RX ADMIN — Medication 0.1 MILLIGRAM(S): at 19:43

## 2018-03-23 RX ADMIN — Medication 20 MILLIGRAM(S): at 15:24

## 2018-03-23 RX ADMIN — SIMVASTATIN 40 MILLIGRAM(S): 20 TABLET, FILM COATED ORAL at 21:54

## 2018-03-23 RX ADMIN — Medication 10 MILLIGRAM(S): at 17:44

## 2018-03-23 RX ADMIN — Medication 20 MILLIGRAM(S): at 05:29

## 2018-03-23 RX ADMIN — Medication 1 DROP(S): at 05:28

## 2018-03-23 RX ADMIN — Medication 100 MILLIGRAM(S): at 17:43

## 2018-03-23 RX ADMIN — SENNA PLUS 2 TABLET(S): 8.6 TABLET ORAL at 21:53

## 2018-03-23 RX ADMIN — PANTOPRAZOLE SODIUM 40 MILLIGRAM(S): 20 TABLET, DELAYED RELEASE ORAL at 17:43

## 2018-03-23 RX ADMIN — Medication 667 MILLIGRAM(S): at 17:43

## 2018-03-23 RX ADMIN — Medication 0.1 MILLIGRAM(S): at 05:29

## 2018-03-23 RX ADMIN — Medication 1: at 09:18

## 2018-03-23 RX ADMIN — Medication 1 DROP(S): at 17:43

## 2018-03-23 RX ADMIN — Medication 10 MILLIGRAM(S): at 05:28

## 2018-03-23 RX ADMIN — Medication 25 MILLIGRAM(S): at 13:01

## 2018-03-23 RX ADMIN — Medication 81 MILLIGRAM(S): at 13:02

## 2018-03-23 RX ADMIN — CARVEDILOL PHOSPHATE 12.5 MILLIGRAM(S): 80 CAPSULE, EXTENDED RELEASE ORAL at 19:42

## 2018-03-23 RX ADMIN — Medication 0.6 MILLIGRAM(S): at 17:43

## 2018-03-23 RX ADMIN — Medication 100 MILLIGRAM(S): at 05:29

## 2018-03-23 RX ADMIN — Medication 5 MILLIGRAM(S): at 15:24

## 2018-03-23 RX ADMIN — PANTOPRAZOLE SODIUM 40 MILLIGRAM(S): 20 TABLET, DELAYED RELEASE ORAL at 05:29

## 2018-03-23 RX ADMIN — Medication 0.6 MILLIGRAM(S): at 05:29

## 2018-03-23 NOTE — PROGRESS NOTE ADULT - ASSESSMENT
Assessment and Plan:   · Assessment		  Ms. Elizabeth is a 58 yo Irish speaking female with hx of hypertension, hyperlipidemia, type 2 diabetes, with 1 month hx of vomiting and abdominal pain, with NSAID use.  Developed pleuritic chest pain, worsened in supine position and with deep breaths.  Chief complaint of N/V and dark stools noted to be in acute pulm edema/pericardial effusion with ARF, underwent US guided Right thoracentesis, mild discomfort noted, seen by Team and ordered Tylenol.  Also complained of no BM x 3 days, added sorbitol and informed PCP to follow.   Also cardiac cath cancelled yesterday due to respiratory distress.  NO EDG until cleared, GI following.        1. pleural effusions - s/p bilateral thoracentesis (1L each side), breathing much improved.    2. abdominal pain/nausea/vomiting- being evaluated by GI, PPI, awaiting endoscopy when stable from cardiopulmonary standpoint.   3. chest pain -  Her troponin is elevated, but CK is normal.  This is not consistent with acute coronary syndrome.  May have had demand ischemia in setting of blood loss anemia.  Probable underlying gastritis with concomitant pericarditis (pericardial rub, etc.).   Now on colchicine (would keep on for a total duration of 1 month). Keep Hb > 9.  Getting 1 unit pRBC today.   Given overall picture, elevated troponin, continued chest pain, needs ischemia evaluation.  Cath today after she receives transfusion  4. anemia - may be due to chronic disease,  GI workup pending.  CKD  Keep Hb > 9.  Getting 1 unit of pRBC today.  5 hypertension- well controlled, continue carvedilol, hydralazine, clonidine, lasix.  Uptitrate as needed.    5. moderate pericardial effusion - hemodynamically stable, chronic.  6. acute on chronic renal insufficiency -   HD per renal  8. paroxysmal atrial fibrillation - newly diagnosed; no AC for now given anemia, will need GI clearance; appreciate EP input regarding KELLI closure candidacy. Will perform GERALD prior to discharge to assess KELLI anatomy and if suitable for KELLI closure.    Thank you for allowing me to participate in care of your patient.   Will follow  D/W primary team

## 2018-03-23 NOTE — PROGRESS NOTE ADULT - PROBLEM SELECTOR PLAN 7
HBA1C: 7.6   continue sliding scale, aptiebnt required 3 UI in 24 hrs     -c/w accuchecks achs tid   -c/w hypoglycemia protocol HBA1C: 7.6   continue sliding scale,   -c/w Accu-Cheks ac hs tid   -c/w hypoglycemia protocol

## 2018-03-23 NOTE — PROGRESS NOTE ADULT - PROBLEM SELECTOR PLAN 3
-No further episodes of AFib  -no AC at this time until GI work up completed  [facility] Cardiology following  Rates well controlled on b blocker  Plan for cath today -No further episodes of AFib  -no AC at this time until GI work up completed  [facility] Cardiology following  Rates well controlled on b blocker  S/P cath today, neg ( done because of chest Pain and Mild elevated troponin on admission )

## 2018-03-23 NOTE — PROGRESS NOTE ADULT - SUBJECTIVE AND OBJECTIVE BOX
Cardiology NP note:    -. chest pain -  Her troponin is elevated, but CK is normal.  This is not consistent with acute coronary syndrome.  May have had demand ischemia in setting of blood loss anemia.  Probable underlying gastritis with concomitant pericarditis (pericardial rub, etc.).   Now on colchicine (would keep on for a total duration of 1 month). Keep Hb > 9.  Getting 1 unit pRBC today.   Given overall picture, elevated troponin, continued chest pain, needs ischemia evaluation.  Cath today after she receives transfusion Cardiology NP preprocedure note:    Ms. Elizabeth is a 56 yo Moroccan speaking female with hx of hypertension, hyperlipidemia, type 2 diabetes, with 1 month hx of vomiting and abdominal pain, with NSAID use.  Developed pleuritic chest pain, worsened in supine position and with deep breaths.  Chief complaint of N/V and dark stools noted to be in acute pulm edema/pericardial effusion with ARF, underwent US guided Right thoracentesis.   Also cardiac cath cancelled yesterday due to respiratory distress.  NO EDG until cleared, GI following.  Patient with c/o chest pain -  Her troponin is elevated, but CK is normal.  This is not consistent with acute coronary syndrome.  May have had demand ischemia in setting of blood loss anemia.  Probable underlying gastritis with concomitant pericarditis (pericardial rub, etc.).   Now on colchicine.  Getting 1 unit pRBC today.   Given overall picture, elevated troponin, continued chest pain, needs ischemia evaluation.  Cath today after she receives transfusion.      ASA 3  mallampati 3

## 2018-03-23 NOTE — PROGRESS NOTE ADULT - PROBLEM SELECTOR PLAN 2
baseline ckd 3 now in ARF with pulm edema/ uremia sx/ uremic pericarditis requiring dialysis  for the 1 st time during this admission  -daily weight   -Acute hepatitis panel negative,   -ppd at 48 hours 0mm negative thus far   -Permacath placed on neck, right side on 03/22/2018 per [facility] surgery.  Plan for A/V fistula on 03/28/2018 once cleared by cardio as per vascular baseline CKD 3 now in ARF with pulm edema/ uremia sx/ uremic pericarditis requiring dialysis  for the 1 st time during this admission  -daily weight   -Acute hepatitis panel negative,   -ppd at 48 hours 0mm negative thus far   -Permacath placed on neck, right side on 03/22/2018 per [facility] surgery.  -HD per renal  -Vein mapping ordered for future AVF/AVG, tentative on 03/28/2018 once cleared by cardio as per vascular  -EPO weekly added on 03/22/18 per nephro  -added Vitamin D and binders yesterday baseline CKD 3 now progressed to ESRD requiring HD: etiology uremic pericarditis requiring dialysis  -Acute hepatitis panel negative,   -ppd at 48 hours 0mm negative thus far   -Permacath placed  03/22/2018 per [facility] surgery.  -HD per renal  -planning on AVF/AVG, tentative on 03/28/2018 by vascular  -EPO weekly added on 03/22/18 per nephro

## 2018-03-23 NOTE — PROGRESS NOTE ADULT - SUBJECTIVE AND OBJECTIVE BOX
[facility]                                                        Office: [address] [place] [address]                                                       Telephone: [phone]. Fax:[phone]      CARDIOLOGY PROGRESS NOTE   ([facility])    Subjective: Patient seen and examined at bedside.  Denies chest pain, shortness of breath, palpitations, dizziness, nausea, bleeding. She says she feels well.     CURRENT MEDICATIONS  carvedilol 12.5 milliGRAM(s) Oral every 12 hours  cloNIDine 0.1 milliGRAM(s) Oral two times a day  furosemide    Tablet 20 milliGRAM(s) Oral daily  hydrALAZINE 25 milliGRAM(s) Oral every 8 hours  hydrALAZINE Injectable 20 milliGRAM(s) IV Push every 6 hours PRN  acetaminophen   Tablet 650 milliGRAM(s) Oral every 6 hours PRN  acetaminophen   Tablet. 650 milliGRAM(s) Oral every 6 hours PRN  metoclopramide Injectable 10 milliGRAM(s) IV Push every 12 hours  ondansetron Injectable 4 milliGRAM(s) IV Push every 6 hours PRN  docusate sodium 100 milliGRAM(s) Oral two times a day  pantoprazole    Tablet 40 milliGRAM(s) Oral two times a day  polyethylene glycol 3350 17 Gram(s) Oral daily  senna 2 Tablet(s) Oral at bedtime  colchicine 0.6 milliGRAM(s) Oral two times a day  dextrose 50% Injectable 12.5 Gram(s) IV Push once  dextrose 50% Injectable 25 Gram(s) IV Push once  dextrose 50% Injectable 25 Gram(s) IV Push once  dextrose Gel 1 Dose(s) Oral once PRN  glucagon  Injectable 1 milliGRAM(s) IntraMuscular once PRN  insulin lispro (HumaLOG) corrective regimen sliding scale   SubCutaneous three times a day before meals  insulin lispro (HumaLOG) corrective regimen sliding scale   SubCutaneous at bedtime  simvastatin 40 milliGRAM(s) Oral at bedtime  artificial  tears Solution 1 Drop(s) Both EYES every 12 hours  aspirin enteric coated 81 milliGRAM(s) Oral daily  calcium acetate 667 milliGRAM(s) Oral three times a day with meals  dextrose 5%. 1000 milliLiter(s) IV Continuous <Continuous>  epoetin gian Injectable 41103 Unit(s) IV Push <User Schedule>  ergocalciferol 56261 Unit(s) Oral every week  PPD  5 Tuberculin Unit(s) Injectable 5 Unit(s) IntraDermal once  	  TELEMETRY: SR  Vitals:  T(C): 37.3 (18 @ 05:27), Max: 37.3 (18 @ 05:27)  HR: 76 (18 @ 05:27) (64 - 77)  BP: 146/60 (18 @ 05:27) (138/84 - 155/87)  RR: 18 (18 @ 05:27) (18 - 18)  SpO2: 97% (18 @ 05:27) (96% - 97%)  Wt(kg): --  I&O's Summary    22 Mar 2018 07:01  -  23 Mar 2018 07:00  --------------------------------------------------------  IN: 120 mL / OUT: 1500 mL / NET: -1380 mL      PHYSICAL EXAM:  Appearance: Normal, NAD	  HEENT:   Normal oral mucosa, PERRL, EOMI	  Lymphatic: No lymphadenopathy  Cardiovascular: Normal S1 S2, No JVD, No murmurs, No edema  Respiratory: trace crackles bilateral base  Psychiatry: A & O x 3, Mood & affect appropriate  Gastrointestinal:  Soft, Non-tender, + BS	  Skin: No rashes, No ecchymoses, No cyanosis  Neurologic: Non-focal  Extremities: Normal range of motion, No clubbing, cyanosis or edema  Vascular: Peripheral pulses palpable 2+ bilaterally, warm      DIAGNOSTIC TESTIN.7    6.8   )-----------( 364      ( 23 Mar 2018 05:47 )             24.1         132<L>  |  92<L>  |  24.0<H>  ----------------------------<  167<H>  4.0   |  23.0  |  3.78<H>    Ca    7.9<L>      23 Mar 2018 05:47  Phos  2.3       Mg     1.8

## 2018-03-23 NOTE — PROGRESS NOTE ADULT - SUBJECTIVE AND OBJECTIVE BOX
PGY-1 Medicine Progress Note    CARMEN  MRN: [id number]    BRIEF HOSPITAL COURSE:   Patient is a 57y old  Female who presents with a chief complaint of N/V and dark stools noted to be in acute pulm edema/pericardial effusion with ARF requiring HD    Events last 24 hours:   Patient seen at beside, sleeping, pleasant. SOB much improved from yesterday ,not present this morning. Permacath placed on 2018, plan for cath today as per cardio and for A/V fistula on 2018 as pe vascular. No acute events reported overnight. LBM: 3 days ago. Denies nausea, vomiting diarrhea, abd pain.         Vital Signs Last 24 Hrs  T(C): 37.3 (23 Mar 2018 05:27), Max: 37.3 (23 Mar 2018 05:27)  T(F): 99.1 (23 Mar 2018 05:27), Max: 99.1 (23 Mar 2018 05:27)  HR: 76 (23 Mar 2018 05:27) (64 - 77)  BP: 146/60 (23 Mar 2018 05:27) (138/84 - 155/87)  RR: 18 (23 Mar 2018 05:27) (18 - 18)  SpO2: 97% (23 Mar 2018 05:27) (96% - 97%)    Physical Examination:  Constitutional: Elderly female NAD, laying in bed  HEENT: PERRLA, EOMI ; mild conjunctival pallor, moist oral mucosa. Permacath on neck, right side. No active bleeding ntoed  Respiratory: mild inspiratory crackles mid/lower b/l, decreased air sounds in same area, no wheezing/rales;     Cardiovascular: S1 and S2, RRR, no murmurs appreciated  Gastrointestinal: BS+ normoactive, soft, ND, NTTP   Extremities: trace edema of lower ext b/l.   Vascular: 2+ peripheral pulses DP  Neurological: AAO x 3, no focal deficits  Musculoskeletal: 5/5 strength b/l upper and lower extremities   Skin: No rashes noted        I&O's Detail    I&O's Detail    22 Mar 2018 07:01  -  23 Mar 2018 07:00  --------------------------------------------------------  IN:    Oral Fluid: 120 mL  Total IN: 120 mL    OUT:    Other: 1500 mL  Total OUT: 1500 mL    Total NET: -1380 mL      Daily Weight in k (23 Mar 2018 04:43)         Lab Results:      CBC Full  -  ( 23 Mar 2018 05:47 )  WBC Count : 6.8 K/uL  Hemoglobin : 7.7 g/dL  Hematocrit : 24.1 %  Platelet Count - Automated : 364 K/uL  Mean Cell Volume : 87.0 fl  Mean Cell Hemoglobin : 27.8 pg  Mean Cell Hemoglobin Concentration : 32.0 g/dL  Auto Neutrophil # : 4.8 K/uL  Auto Lymphocyte # : 0.7 K/uL  Auto Monocyte # : 0.8 K/uL  Auto Eosinophil # : 0.4 K/uL  Auto Basophil # : 0.0 K/uL  Auto Neutrophil % : 70.3 %  Auto Lymphocyte % : 10.9 %  Auto Monocyte % : 12.5 %  Auto Eosinophil % : 5.4 %  Auto Basophil % : 0.3 %                          8.9    8.1   )-----------( 514      ( 22 Mar 2018 06:35 )             26.9     03-23    132<L>  |  92<L>  |  24.0<H>  ----------------------------<  167<H>  4.0   |  23.0  |  3.78<H>    Ca    7.9<L>      23 Mar 2018 05:47  Phos  2.3       Mg     1.8             MICROBIOLOGY/CULTURES:  Culture Results:   No growth at 1 day.  Culture in progress ( @ 11:30)  Culture Results:   No growth at 2 days.  Culture in progress ( @ 12:48)  Culture Results:   <10,000 CFU/ml Gram Positive Cocci in Clusters  <10,000 CFU/ml Gram Positive Rods ( @ 21:37)      RADIOLOGY RESULTS:  Culture - Body Fluid with Gram Stain (collected 20 Mar 2018 12:48)  Source: .Body Fluid Pleural Fluid  Gram Stain (20 Mar 2018 13:30):    No White blood cells    No organisms seen        MEDICATIONS  (STANDING):  artificial  tears Solution 1 Drop(s) Both EYES every 12 hours  aspirin enteric coated 81 milliGRAM(s) Oral daily  calcium acetate 667 milliGRAM(s) Oral three times a day with meals  carvedilol 12.5 milliGRAM(s) Oral every 12 hours  cloNIDine 0.1 milliGRAM(s) Oral two times a day  colchicine 0.6 milliGRAM(s) Oral two times a day  dextrose 5%. 1000 milliLiter(s) (50 mL/Hr) IV Continuous <Continuous>  dextrose 50% Injectable 12.5 Gram(s) IV Push once  dextrose 50% Injectable 25 Gram(s) IV Push once  dextrose 50% Injectable 25 Gram(s) IV Push once  docusate sodium 100 milliGRAM(s) Oral two times a day  epoetin gian Injectable 74966 Unit(s) IV Push <User Schedule>  ergocalciferol 57129 Unit(s) Oral every week  furosemide    Tablet 20 milliGRAM(s) Oral daily  hydrALAZINE 25 milliGRAM(s) Oral every 8 hours  insulin lispro (HumaLOG) corrective regimen sliding scale   SubCutaneous three times a day before meals  insulin lispro (HumaLOG) corrective regimen sliding scale   SubCutaneous at bedtime  metoclopramide Injectable 10 milliGRAM(s) IV Push every 12 hours  pantoprazole    Tablet 40 milliGRAM(s) Oral two times a day  polyethylene glycol 3350 17 Gram(s) Oral daily  PPD  5 Tuberculin Unit(s) Injectable 5 Unit(s) IntraDermal once  senna 2 Tablet(s) Oral at bedtime  simvastatin 40 milliGRAM(s) Oral at bedtime    MEDICATIONS  (PRN):  acetaminophen   Tablet 650 milliGRAM(s) Oral every 6 hours PRN For Temp greater than 38 C (100.4 F)  acetaminophen   Tablet. 650 milliGRAM(s) Oral every 6 hours PRN Moderate Pain (4 - 6)  dextrose Gel 1 Dose(s) Oral once PRN Blood Glucose LESS THAN 70 milliGRAM(s)/deciliter  glucagon  Injectable 1 milliGRAM(s) IntraMuscular once PRN Glucose LESS THAN 70 milligrams/deciliter  hydrALAZINE Injectable 20 milliGRAM(s) IV Push every 6 hours PRN SBP > 160  ondansetron Injectable 4 milliGRAM(s) IV Push every 6 hours PRN Nausea

## 2018-03-23 NOTE — PROGRESS NOTE ADULT - PROBLEM SELECTOR PLAN 5
likely multifactorial due to worsening acute on chronic renal insufficiency in the setting of CKD,  suspected GI bleed (initially with coffee ground emesis) but occult stool negative and degree of AOCD/ iron deficiency   -s/p 2 units pRBC on admission with improvement in h/h has remained stable   -last colonoscopy /EGD 2017 WNL as per patient  -occult stool neg  -will c/w monitoring h/h   -anemia panel noted with degree iron deficiency   -c/w ppi po bid   -c/w iron sucrose as per nephro and eventual change to ferrous sulfate   -pt could benefit from epogen   -GI f/u noted and appreciated and recommended conservative management at this time and after cardio clearance will likely proceed with endoscopic intervention likely Anemia of chronic disease due to worsening acute on chronic renal insufficiency in the setting of CKD,  and concomitant  iron deficiency   -s/p 2 units pRBC on admission with improvement in h/h has remained and one more unit today 3/23  -occult stool neg  -c/w ppi po bid   -GI consulted, planning on EGD on Monday  continue EPOGEN with HD and ferrous sulfate

## 2018-03-23 NOTE — PROGRESS NOTE ADULT - PROBLEM SELECTOR PLAN 1
Patient has improving sob, fluid overload is reduced; clinically improved;  continue furosemide 20mg po daily;      -Bilateral pleural effusions and pericardial effusion.  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  - s/p R thoracocentesis on 3/20 with 1100 cc removed  - L thoracocentesis done 3/21 secondary to Uremic Pericarditis  clinically improved after regular HD  continue furosemide 20mg po daily;    -Bilateral pleural effusions and pericardial effusion.  TTE with preserved EF,   -c/w supplemental o2 as needed;    -c/w colchicine 0.6mg po bid for presumed Uremic pericarditis. Per cardiology, Needs total 4 weeks and then stop  -Hemodialysis as per nephrology recommendations.  - s/p R thoracocentesis on 3/20 with 1100 cc removed  - L thoracocentesis done 3/21 with 1000 cc drained  Fluid analysis consistent with transudate

## 2018-03-23 NOTE — PROGRESS NOTE ADULT - PROBLEM SELECTOR PLAN 4
Continue HD  Cardiology and Nephrology following  -continue colchicine Continue HD  Cardiology and Nephrology following  -continue colchicine  Per cardiology, Need colchicine for total 4 weeks from start date

## 2018-03-23 NOTE — PROGRESS NOTE ADULT - ASSESSMENT
57 y.o. F with hx of HTN, HLD, DMII, hyperkalemia, ckd 3 who presented with 3 weeks of postprandial vomiting and abd pain, noted to have ARF, fluid overload with bilateral pleural effusions and pericardial effusion as well as symptomatic anemia likely multifactorial secondary to acute on chronic renal failure and suspected upper GI Bleeding from vomiting/retching and NSAID use. Clinical concern that worsening acute on chronic renal failure possibly caused uremia related sx of N/V, with patients NSAID use and retching suspected GI bleed but occult blood returned negative, for dropping h/h pt received 2 u prbc on admission with appropriate response to therapy. 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 In the interim while being dialyzed the patient had an episode of PAF, s/p cardizem became rate controlled and remained in NSR. EP consulted. Unable to AC the patient due to initial concern for GI bleeding, for which GI consulted and pt pending possible GI intervention when respiratory status improves. Clinical concern for ARF causing volume overload, unclear if HFPEF is a contributing factor.     S/P Hemodialysis on [place] 3/18, 1.6 Liters taken off.  S/p R thoracocentesis on 3/20 with 1100 cc removed  S/Ppleurocentesis done 3/21 on left sided with removal of 1 L.  Plan for Cath today, will f/u with cardio, she was npo after midnight    Once cleared by Cardiology patient will get endoscopy by GI, workup suspected upper gi bleed and A/V fistula on 03/28/2018 as per vascular surgery 57 y.o. F with hx of HTN, HLD, DMII, hyperkalemia, ckd 3 who presented with 3 weeks of postprandial vomiting and abd pain, noted to have ARF, fluid overload with bilateral pleural effusions and pericardial effusion as well as symptomatic anemia likely multifactorial secondary to acute on chronic renal failure and suspected upper GI Bleeding from vomiting/retching and NSAID use. Clinical concern that worsening acute on chronic renal failure possibly caused uremia related sx of N/V, with patients NSAID use and retching suspected GI bleed but occult blood returned negative, for dropping h/h pt received 2 u prbc on admission with appropriate response to therapy. 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 In the interim while being dialyzed the patient had an episode of PAF, s/p cardizem became rate controlled and remained in NSR. EP consulted. Unable to AC the patient due to initial concern for GI bleeding, for which GI consulted and pt pending possible GI intervention when respiratory status improves. Clinical concern for ARF causing volume overload, unclear if HFPEF is a contributing factor.     S/p R thoracocentesis on 3/20 with 1100 cc removed  S/Ppleurocentesis done 3/21 on left sided with removal of 1 L. Fluid analysis consistent with transudate   Plan for Cath today, NEG  Planning for endoscopy by GI, workup for anemia   and A/V fistula on 03/28/2018 as per vascular surgery

## 2018-03-23 NOTE — PROGRESS NOTE ADULT - SUBJECTIVE AND OBJECTIVE BOX
The patient is a 57y old female who presents with complaints of nausea and vomiting after   meals fort the past three weeks.  She also has severe pleuritic chest pain after vomiting.  She is scheduled to an EGD on Monday March 26th.    (16 Mar 2018 17:20)      PAST MEDICAL HISTORY:  L.V.E.F. = 60 to 65%  (TTE 3/14/2018)  Hyperlipidemia  Hypertension x 1 year  Diabetes x 15 years  Chronic renal insufficiency    PAST SURGICAL HISTORY:  No past surgical history      MEDICATIONS  (STANDING):  artificial  tears Solution 1 Drop(s) Both EYES every 12 hours  aspirin enteric coated 81 milliGRAM(s) Oral daily  calcium acetate 667 milliGRAM(s) Oral three times a day with meals  carvedilol 12.5 milliGRAM(s) Oral every 12 hours  cloNIDine 0.1 milliGRAM(s) Oral two times a day  colchicine 0.6 milliGRAM(s) Oral two times a day  dextrose 5%. 1000 milliLiter(s) (50 mL/Hr) IV Continuous <Continuous>  dextrose 50% Injectable 12.5 Gram(s) IV Push once  dextrose 50% Injectable 25 Gram(s) IV Push once  dextrose 50% Injectable 25 Gram(s) IV Push once  docusate sodium 100 milliGRAM(s) Oral two times a day  epoetin gian Injectable 62850 Unit(s) IV Push <User Schedule>  ergocalciferol 41091 Unit(s) Oral every week  furosemide    Tablet 20 milliGRAM(s) Oral daily  hydrALAZINE 25 milliGRAM(s) Oral every 8 hours  insulin lispro (HumaLOG) corrective regimen sliding scale   SubCutaneous three times a day before meals  insulin lispro (HumaLOG) corrective regimen sliding scale   SubCutaneous at bedtime  metoclopramide Injectable 10 milliGRAM(s) IV Push every 12 hours  pantoprazole    Tablet 40 milliGRAM(s) Oral two times a day  polyethylene glycol 3350 17 Gram(s) Oral daily  PPD  5 Tuberculin Unit(s) Injectable 5 Unit(s) IntraDermal once  senna 2 Tablet(s) Oral at bedtime  simvastatin 40 milliGRAM(s) Oral at bedtime    MEDICATIONS  (PRN):  acetaminophen   Tablet 650 milliGRAM(s) Oral every 6 hours PRN For Temp greater than 38 C (100.4 F)  acetaminophen   Tablet. 650 milliGRAM(s) Oral every 6 hours PRN Moderate Pain (4 - 6)  dextrose Gel 1 Dose(s) Oral once PRN Blood Glucose LESS THAN 70 milliGRAM(s)/deciliter  glucagon  Injectable 1 milliGRAM(s) IntraMuscular once PRN Glucose LESS THAN 70 milligrams/deciliter  hydrALAZINE Injectable 20 milliGRAM(s) IV Push every 6 hours PRN SBP > 160  ondansetron Injectable 4 milliGRAM(s) IV Push every 6 hours PRN Nausea      Allergies:     No Known Drug Allergies    SOCIAL HISTORY:     The patient does not drink alcohol, smoke or use illicit drugs                         7.7    6.8   )-----------( 364      ( 23 Mar 2018 05:47 )             24.1       PT/INR - ( 19 Mar 2018 05:56 )   PT: 13.4 sec;   INR: 1.21 ratio         PTT - ( 19 Mar 2018 05:56 )  PTT:30.2 sec    03-23    132<L>  |  92<L>  |  24.0<H>  ----------------------------<  167<H>  4.0   |  23.0  |  3.78<H>    Ca    7.9<L>      23 Mar 2018 05:47  Phos  2.3     03-23  Mg     1.8     03-23    EKG:  -  3/16/2018  Atrial fibrillation with rapid ventricular response  Nonspecific T wave abnormality  Abnormal ECG    TT ECHO:  3/14/2018  Summary:   1. Left ventricular ejection fraction, by visual estimation, is 60 to        65%.   2. Normal global left ventricular systolic function.   3. Spectral Doppler shows impaired relaxation pattern of left        ventricular myocardial filling (Grade I diastolic dysfunction).   4. Normal right ventricular size and function.   5. Moderate pericardial effusion.   6. Moderate pleural effusion in both left and right lateral regions.   7. Mild mitral valve regurgitation.   8. Sclerotic aortic valve with normal opening.  10. Mildly dilated pulmonary artery.    CATH LAB REPORT  -  3/23/2018  VENTRICLES: There were no left ventricular global or regional wall motion  abnormalities. Global left ventricular function was normal. EF calculated  by contrast ventriculography was 55 %.    CHEST X-RAY  -   3/21/2018  Heart: Cardiomegaly  Lungs/Airways: Decreased left pleural effusion. No significant   pneumothorax. Multiple moderate right pleural effusion.    ASA # = 4 Mallampati # = 3 The patient is a 57y old female who presents with complaints of nausea and vomiting after   meals for the past three weeks.  She also has severe pleuritic chest pain after vomiting.  She is scheduled to an EGD on Monday March 26th.    (16 Mar 2018 17:20)  Now on 3/28/2018  she is scheduled to have a LEFT ARTERIOVENOUS FISTULA CREATION, POSSIBLE LEFT   ARTERIOVENOUS GRAFT.       PAST MEDICAL HISTORY:  L.V.E.F. = 60 to 65%  (TTE 3/14/2018)  Hyperlipidemia  Hypertension x 1 year  Diabetes x 15 years  Chronic renal insufficiency    PAST SURGICAL HISTORY:  No past surgical history      MEDICATIONS  (STANDING):  artificial  tears Solution 1 Drop(s) Both EYES every 12 hours  aspirin enteric coated 81 milliGRAM(s) Oral daily  calcium acetate 667 milliGRAM(s) Oral three times a day with meals  carvedilol 12.5 milliGRAM(s) Oral every 12 hours  cloNIDine 0.1 milliGRAM(s) Oral two times a day  colchicine 0.6 milliGRAM(s) Oral two times a day  dextrose 5%. 1000 milliLiter(s) (50 mL/Hr) IV Continuous <Continuous>  dextrose 50% Injectable 12.5 Gram(s) IV Push once  dextrose 50% Injectable 25 Gram(s) IV Push once  dextrose 50% Injectable 25 Gram(s) IV Push once  docusate sodium 100 milliGRAM(s) Oral two times a day  epoetin gian Injectable 85656 Unit(s) IV Push <User Schedule>  ergocalciferol 62949 Unit(s) Oral every week  furosemide    Tablet 20 milliGRAM(s) Oral daily  hydrALAZINE 25 milliGRAM(s) Oral every 8 hours  insulin lispro (HumaLOG) corrective regimen sliding scale   SubCutaneous three times a day before meals  insulin lispro (HumaLOG) corrective regimen sliding scale   SubCutaneous at bedtime  metoclopramide Injectable 10 milliGRAM(s) IV Push every 12 hours  pantoprazole    Tablet 40 milliGRAM(s) Oral two times a day  polyethylene glycol 3350 17 Gram(s) Oral daily  PPD  5 Tuberculin Unit(s) Injectable 5 Unit(s) IntraDermal once  senna 2 Tablet(s) Oral at bedtime  simvastatin 40 milliGRAM(s) Oral at bedtime    MEDICATIONS  (PRN):  acetaminophen   Tablet 650 milliGRAM(s) Oral every 6 hours PRN For Temp greater than 38 C (100.4 F)  acetaminophen   Tablet. 650 milliGRAM(s) Oral every 6 hours PRN Moderate Pain (4 - 6)  dextrose Gel 1 Dose(s) Oral once PRN Blood Glucose LESS THAN 70 milliGRAM(s)/deciliter  glucagon  Injectable 1 milliGRAM(s) IntraMuscular once PRN Glucose LESS THAN 70 milligrams/deciliter  hydrALAZINE Injectable 20 milliGRAM(s) IV Push every 6 hours PRN SBP > 160  ondansetron Injectable 4 milliGRAM(s) IV Push every 6 hours PRN Nausea      Allergies:     No Known Drug Allergies    SOCIAL HISTORY:     The patient does not drink alcohol, smoke or use illicit drugs                         7.7    6.8   )-----------( 364      ( 23 Mar 2018 05:47 )             24.1       PT/INR - ( 19 Mar 2018 05:56 )   PT: 13.4 sec;   INR: 1.21 ratio         PTT - ( 19 Mar 2018 05:56 )  PTT:30.2 sec    03-23    132<L>  |  92<L>  |  24.0<H>  ----------------------------<  167<H>  4.0   |  23.0  |  3.78<H>    Ca    7.9<L>      23 Mar 2018 05:47  Phos  2.3     03-23  Mg     1.8     03-23    EKG:  -  3/16/2018  Atrial fibrillation with rapid ventricular response  Nonspecific T wave abnormality  Abnormal ECG    TT ECHO:  3/14/2018  Summary:   1. Left ventricular ejection fraction, by visual estimation, is 60 to        65%.   2. Normal global left ventricular systolic function.   3. Spectral Doppler shows impaired relaxation pattern of left        ventricular myocardial filling (Grade I diastolic dysfunction).   4. Normal right ventricular size and function.   5. Moderate pericardial effusion.   6. Moderate pleural effusion in both left and right lateral regions.   7. Mild mitral valve regurgitation.   8. Sclerotic aortic valve with normal opening.  10. Mildly dilated pulmonary artery.    CATH LAB REPORT  -  3/23/2018  VENTRICLES: There were no left ventricular global or regional wall motion  abnormalities. Global left ventricular function was normal. EF calculated  by contrast ventriculography was 55 %.    CHEST X-RAY  -   3/21/2018  Heart: Cardiomegaly  Lungs/Airways: Decreased left pleural effusion. No significant   pneumothorax. Multiple moderate right pleural effusion.    ASA # = 4 Mallampati # = 3 The patient is a 57y old female who presents with complaints of nausea and vomiting after   meals for the past three weeks.  She also has severe pleuritic chest pain after vomiting.  She is scheduled to an EGD on Monday March 26th.    (16 Mar 2018 17:20)  Now on 3/28/2018  she is scheduled to have a LEFT ARTERIOVENOUS FISTULA CREATION, POSSIBLE LEFT   ARTERIOVENOUS GRAFT. Following this procedure she is scheduled to have an EGD on   3/29/2018.      PAST MEDICAL HISTORY:  L.V.E.F. = 55% by contrast ventriculotomy during a cardiac cath on 3/23/2018  L.V.E.F. = 60 to 65%  (TTE 3/14/2018)  Hyperlipidemia  Hypertension x 1 year  Diabetes x 15 years  Chronic renal insufficiency    PAST SURGICAL HISTORY:  No past surgical history      MEDICATIONS  (STANDING):  artificial  tears Solution 1 Drop(s) Both EYES every 12 hours  aspirin enteric coated 81 milliGRAM(s) Oral daily  calcium acetate 667 milliGRAM(s) Oral three times a day with meals  carvedilol 12.5 milliGRAM(s) Oral every 12 hours  cloNIDine 0.1 milliGRAM(s) Oral two times a day  colchicine 0.6 milliGRAM(s) Oral two times a day  dextrose 5%. 1000 milliLiter(s) (50 mL/Hr) IV Continuous <Continuous>  dextrose 50% Injectable 12.5 Gram(s) IV Push once  dextrose 50% Injectable 25 Gram(s) IV Push once  dextrose 50% Injectable 25 Gram(s) IV Push once  docusate sodium 100 milliGRAM(s) Oral two times a day  epoetin gian Injectable 60476 Unit(s) IV Push <User Schedule>  ergocalciferol 39504 Unit(s) Oral every week  furosemide    Tablet 20 milliGRAM(s) Oral daily  hydrALAZINE 25 milliGRAM(s) Oral every 8 hours  insulin lispro (HumaLOG) corrective regimen sliding scale   SubCutaneous three times a day before meals  insulin lispro (HumaLOG) corrective regimen sliding scale   SubCutaneous at bedtime  metoclopramide Injectable 10 milliGRAM(s) IV Push every 12 hours  pantoprazole    Tablet 40 milliGRAM(s) Oral two times a day  polyethylene glycol 3350 17 Gram(s) Oral daily  PPD  5 Tuberculin Unit(s) Injectable 5 Unit(s) IntraDermal once  senna 2 Tablet(s) Oral at bedtime  simvastatin 40 milliGRAM(s) Oral at bedtime    MEDICATIONS  (PRN):  acetaminophen   Tablet 650 milliGRAM(s) Oral every 6 hours PRN For Temp greater than 38 C (100.4 F)  acetaminophen   Tablet. 650 milliGRAM(s) Oral every 6 hours PRN Moderate Pain (4 - 6)  dextrose Gel 1 Dose(s) Oral once PRN Blood Glucose LESS THAN 70 milliGRAM(s)/deciliter  glucagon  Injectable 1 milliGRAM(s) IntraMuscular once PRN Glucose LESS THAN 70 milligrams/deciliter  hydrALAZINE Injectable 20 milliGRAM(s) IV Push every 6 hours PRN SBP > 160  ondansetron Injectable 4 milliGRAM(s) IV Push every 6 hours PRN Nausea      Allergies:     No Known Drug Allergies    SOCIAL HISTORY:     The patient does not drink alcohol, smoke or use illicit drugs                         7.7    6.8   )-----------( 364      ( 23 Mar 2018 05:47 )             24.1       PT/INR - ( 19 Mar 2018 05:56 )   PT: 13.4 sec;   INR: 1.21 ratio         PTT - ( 19 Mar 2018 05:56 )  PTT:30.2 sec    03-23    132<L>  |  92<L>  |  24.0<H>  ----------------------------<  167<H>  4.0   |  23.0  |  3.78<H>    Ca    7.9<L>      23 Mar 2018 05:47  Phos  2.3     03-23  Mg     1.8     03-23    EKG:  -  3/16/2018  Atrial fibrillation with rapid ventricular response  Nonspecific T wave abnormality  Abnormal ECG    TT ECHO:  3/14/2018  Summary:   1. Left ventricular ejection fraction, by visual estimation, is 60 to        65%.   2. Normal global left ventricular systolic function.   3. Spectral Doppler shows impaired relaxation pattern of left        ventricular myocardial filling (Grade I diastolic dysfunction).   4. Normal right ventricular size and function.   5. Moderate pericardial effusion.   6. Moderate pleural effusion in both left and right lateral regions.   7. Mild mitral valve regurgitation.   8. Sclerotic aortic valve with normal opening.  10. Mildly dilated pulmonary artery.    CATH LAB REPORT  -  3/23/2018  VENTRICLES: There were no left ventricular global or regional wall motion  abnormalities. Global left ventricular function was normal. EF calculated  by contrast ventriculography was 55 %.    CHEST X-RAY  -   3/21/2018  Heart: Cardiomegaly  Lungs/Airways: Decreased left pleural effusion. No significant   pneumothorax. Multiple moderate right pleural effusion.    ASA # = 4 Mallampati # = 3 The patient is a 57y old female who presents with complaints of nausea and vomiting after   meals for the past three weeks.  She also has severe pleuritic chest pain after vomiting.  She is scheduled to an EGD on Monday March 26th.    (16 Mar 2018 17:20)  Now on 3/28/2018  she is scheduled to have a LEFT ARTERIOVENOUS FISTULA CREATION, POSSIBLE LEFT   ARTERIOVENOUS GRAFT. Following this procedure she is scheduled to have an EGD on   3/29/2018.      PAST MEDICAL HISTORY:  L.V.E.F. = 55% by contrast ventriculography during a cardiac cath on 3/23/2018  L.V.E.F. = 60 to 65%  (TTE 3/14/2018)  Hyperlipidemia  Hypertension x 1 year  Diabetes x 15 years  Chronic renal insufficiency    PAST SURGICAL HISTORY:  Left arteriovenous fistula creation 3/28/2018    MEDICATIONS  (STANDING):  artificial  tears Solution 1 Drop(s) Both EYES every 12 hours  aspirin enteric coated 81 milliGRAM(s) Oral daily  calcium acetate 667 milliGRAM(s) Oral three times a day with meals  carvedilol 12.5 milliGRAM(s) Oral every 12 hours  cloNIDine 0.1 milliGRAM(s) Oral two times a day  colchicine 0.6 milliGRAM(s) Oral two times a day  dextrose 5%. 1000 milliLiter(s) (50 mL/Hr) IV Continuous <Continuous>  dextrose 50% Injectable 12.5 Gram(s) IV Push once  dextrose 50% Injectable 25 Gram(s) IV Push once  dextrose 50% Injectable 25 Gram(s) IV Push once  docusate sodium 100 milliGRAM(s) Oral two times a day  epoetin gian Injectable 73243 Unit(s) IV Push <User Schedule>  ergocalciferol 48099 Unit(s) Oral every week  furosemide    Tablet 20 milliGRAM(s) Oral daily  hydrALAZINE 25 milliGRAM(s) Oral every 8 hours  insulin lispro (HumaLOG) corrective regimen sliding scale   SubCutaneous three times a day before meals  insulin lispro (HumaLOG) corrective regimen sliding scale   SubCutaneous at bedtime  metoclopramide Injectable 10 milliGRAM(s) IV Push every 12 hours  pantoprazole    Tablet 40 milliGRAM(s) Oral two times a day  polyethylene glycol 3350 17 Gram(s) Oral daily  PPD  5 Tuberculin Unit(s) Injectable 5 Unit(s) IntraDermal once  senna 2 Tablet(s) Oral at bedtime  simvastatin 40 milliGRAM(s) Oral at bedtime    MEDICATIONS  (PRN):  acetaminophen   Tablet 650 milliGRAM(s) Oral every 6 hours PRN For Temp greater than 38 C (100.4 F)  acetaminophen   Tablet. 650 milliGRAM(s) Oral every 6 hours PRN Moderate Pain (4 - 6)  dextrose Gel 1 Dose(s) Oral once PRN Blood Glucose LESS THAN 70 milliGRAM(s)/deciliter  glucagon  Injectable 1 milliGRAM(s) IntraMuscular once PRN Glucose LESS THAN 70 milligrams/deciliter  hydrALAZINE Injectable 20 milliGRAM(s) IV Push every 6 hours PRN SBP > 160  ondansetron Injectable 4 milliGRAM(s) IV Push every 6 hours PRN Nausea      Allergies:     No Known Drug Allergies    SOCIAL HISTORY:     The patient does not drink alcohol, smoke or use illicit drugs                         7.7    6.8   )-----------( 364      ( 23 Mar 2018 05:47 )             24.1       PT/INR - ( 19 Mar 2018 05:56 )   PT: 13.4 sec;   INR: 1.21 ratio         PTT - ( 19 Mar 2018 05:56 )  PTT:30.2 sec    03-23    132<L>  |  92<L>  |  24.0<H>  ----------------------------<  167<H>  4.0   |  23.0  |  3.78<H>    Ca    7.9<L>      23 Mar 2018 05:47  Phos  2.3     03-23  Mg     1.8     03-23    EKG:  -  3/16/2018  Atrial fibrillation with rapid ventricular response  Nonspecific T wave abnormality  Abnormal ECG    TT ECHO:  3/14/2018  Summary:   1. Left ventricular ejection fraction, by visual estimation, is 60 to        65%.   2. Normal global left ventricular systolic function.   3. Spectral Doppler shows impaired relaxation pattern of left        ventricular myocardial filling (Grade I diastolic dysfunction).   4. Normal right ventricular size and function.   5. Moderate pericardial effusion.   6. Moderate pleural effusion in both left and right lateral regions.   7. Mild mitral valve regurgitation.   8. Sclerotic aortic valve with normal opening.  10. Mildly dilated pulmonary artery.    CATH LAB REPORT  -  3/23/2018  VENTRICLES: There were no left ventricular global or regional wall motion  abnormalities. Global left ventricular function was normal. EF calculated  by contrast ventriculography was 55 %.    CHEST X-RAY  -   3/21/2018  Heart: Cardiomegaly  Lungs/Airways: Decreased left pleural effusion. No significant   pneumothorax. Multiple moderate right pleural effusion.    ASA # = 4 Mallampati # = 3 The patient is a 57y old female who presents with complaints of nausea and vomiting after   meals for the past three weeks.  She also has severe pleuritic chest pain after vomiting.  She is scheduled to an EGD on Monday March 26th.    (16 Mar 2018 17:20)  Now on 3/28/2018  she is scheduled to have a LEFT ARTERIOVENOUS FISTULA CREATION, POSSIBLE LEFT   ARTERIOVENOUS GRAFT. Following this procedure she is scheduled to have an EGD on   3/30/2018.      PAST MEDICAL HISTORY:  L.V.E.F. = 55% by contrast ventriculography during a cardiac cath on 3/23/2018  L.V.E.F. = 60 to 65%  (TTE 3/14/2018)  Hyperlipidemia  Hypertension x 1 year  Diabetes x 15 years  Chronic renal insufficiency    PAST SURGICAL HISTORY:  Left arteriovenous fistula creation 3/28/2018    MEDICATIONS  (STANDING):  artificial  tears Solution 1 Drop(s) Both EYES every 12 hours  aspirin enteric coated 81 milliGRAM(s) Oral daily  calcium acetate 667 milliGRAM(s) Oral three times a day with meals  carvedilol 12.5 milliGRAM(s) Oral every 12 hours  cloNIDine 0.1 milliGRAM(s) Oral two times a day  colchicine 0.6 milliGRAM(s) Oral two times a day  dextrose 5%. 1000 milliLiter(s) (50 mL/Hr) IV Continuous <Continuous>  dextrose 50% Injectable 12.5 Gram(s) IV Push once  dextrose 50% Injectable 25 Gram(s) IV Push once  dextrose 50% Injectable 25 Gram(s) IV Push once  docusate sodium 100 milliGRAM(s) Oral two times a day  epoetin gian Injectable 75450 Unit(s) IV Push <User Schedule>  ergocalciferol 64934 Unit(s) Oral every week  furosemide    Tablet 20 milliGRAM(s) Oral daily  hydrALAZINE 25 milliGRAM(s) Oral every 8 hours  insulin lispro (HumaLOG) corrective regimen sliding scale   SubCutaneous three times a day before meals  insulin lispro (HumaLOG) corrective regimen sliding scale   SubCutaneous at bedtime  metoclopramide Injectable 10 milliGRAM(s) IV Push every 12 hours  pantoprazole    Tablet 40 milliGRAM(s) Oral two times a day  polyethylene glycol 3350 17 Gram(s) Oral daily  PPD  5 Tuberculin Unit(s) Injectable 5 Unit(s) IntraDermal once  senna 2 Tablet(s) Oral at bedtime  simvastatin 40 milliGRAM(s) Oral at bedtime    MEDICATIONS  (PRN):  acetaminophen   Tablet 650 milliGRAM(s) Oral every 6 hours PRN For Temp greater than 38 C (100.4 F)  acetaminophen   Tablet. 650 milliGRAM(s) Oral every 6 hours PRN Moderate Pain (4 - 6)  dextrose Gel 1 Dose(s) Oral once PRN Blood Glucose LESS THAN 70 milliGRAM(s)/deciliter  glucagon  Injectable 1 milliGRAM(s) IntraMuscular once PRN Glucose LESS THAN 70 milligrams/deciliter  hydrALAZINE Injectable 20 milliGRAM(s) IV Push every 6 hours PRN SBP > 160  ondansetron Injectable 4 milliGRAM(s) IV Push every 6 hours PRN Nausea      Allergies:     No Known Drug Allergies    SOCIAL HISTORY:     The patient does not drink alcohol, smoke or use illicit drugs                         7.7    6.8   )-----------( 364      ( 23 Mar 2018 05:47 )             24.1       PT/INR - ( 19 Mar 2018 05:56 )   PT: 13.4 sec;   INR: 1.21 ratio         PTT - ( 19 Mar 2018 05:56 )  PTT:30.2 sec    03-23    132<L>  |  92<L>  |  24.0<H>  ----------------------------<  167<H>  4.0   |  23.0  |  3.78<H>    Ca    7.9<L>      23 Mar 2018 05:47  Phos  2.3     03-23  Mg     1.8     03-23    EKG:  -  3/16/2018  Atrial fibrillation with rapid ventricular response  Nonspecific T wave abnormality  Abnormal ECG    TT ECHO:  3/14/2018  Summary:   1. Left ventricular ejection fraction, by visual estimation, is 60 to        65%.   2. Normal global left ventricular systolic function.   3. Spectral Doppler shows impaired relaxation pattern of left        ventricular myocardial filling (Grade I diastolic dysfunction).   4. Normal right ventricular size and function.   5. Moderate pericardial effusion.   6. Moderate pleural effusion in both left and right lateral regions.   7. Mild mitral valve regurgitation.   8. Sclerotic aortic valve with normal opening.  10. Mildly dilated pulmonary artery.    CATH LAB REPORT  -  3/23/2018  VENTRICLES: There were no left ventricular global or regional wall motion  abnormalities. Global left ventricular function was normal. EF calculated  by contrast ventriculography was 55 %.    CHEST X-RAY  -   3/21/2018  Heart: Cardiomegaly  Lungs/Airways: Decreased left pleural effusion. No significant   pneumothorax. Multiple moderate right pleural effusion.    ASA # = 4 Mallampati # = 3

## 2018-03-23 NOTE — CHART NOTE - NSCHARTNOTEFT_GEN_A_CORE
Tried to get in touch with patient's emergency contact: Bhakti Betancur at [phone] and then with patient's son Feliciano Elizabeth to [phone]    regarding patient's updates. They didn't answer the phone. Left message with contact info. Will try again later Spoke to patient's  emergency contact Bhakti Betancur at [phone] patient's updates. She was very appreciative and will continue to follow.  Also spoke to case management, Radha who mentioned that the paperwork for outpatient HD is sent and pending approval.

## 2018-03-23 NOTE — PROGRESS NOTE ADULT - SUBJECTIVE AND OBJECTIVE BOX
Cardiology NP note:     -s/p LHC: Mild coronary irregularities  -Right radial band in place--Site benign without hematoma/bleeding; RUE warm/mobile/acyanotic; + right ulnar pulse  -VSS--IV hydralazine for elevated BP    -A/P: Ms. Elizabeth is a 56 yo Greek speaking female with hx of hypertension, hyperlipidemia, type 2 diabetes, with 1 month hx of vomiting and abdominal pain, with NSAID use.  Developed pleuritic chest pain, worsened in supine position and with deep breaths.  Chief complaint of N/V and dark stools noted to be in acute pulm edema/pericardial effusion with ARF, underwent US guided Right thoracentesis.   Also cardiac cath cancelled yesterday due to respiratory distress.  Patient with c/o chest pain -  Her troponin is elevated, but CK is normal.  This is not consistent with acute coronary syndrome.  May have had demand ischemia in setting of blood loss anemia.  Probable underlying gastritis with concomitant pericarditis (pericardial rub, etc.).   Now on colchicine.  Getting 1 unit pRBC today.   Given overall picture, elevated troponin, continued chest pain, she needed ischemia evaluation and now s/p LHC that revealed mild coronary irregularities.  -Return to tele bed  -Discontinue Radial band at 1530  -Bedrest until 1530 then OOB as tolerated  -Aggressive med management  -Additional plan as per Attending MD  -Discussed with Dr. Godinez

## 2018-03-23 NOTE — PROGRESS NOTE ADULT - PROBLEM SELECTOR PLAN 6
-bp stable, SBP peaks to the high 150's but patient has been receiving daily HD  -c/w clonidine and will up titrate as needed  -c/w hydralazine 25mgpo q 8hrs -c/w clonidine and will up titrate as needed  -c/w hydralazine 25mgpo q 8hrs  - continue coreg and Lasix   HD also helping with HTN

## 2018-03-23 NOTE — PROGRESS NOTE ADULT - ATTENDING COMMENTS
I have seen and examined the patient, reviewed the chart, labs and diagnostics and I agree with assessment and plan as above with resident team. note edited as above where required     S/P   cardiac cath done, Neg  s/p 1 U PRBC  Plan for EGD on Monday for further evaluation/work up of anemia  Daily HD today and tomorrow, because of pericardial rub  Outpatient HD-- in process. Spoke with social work/case management I have seen and examined the patient, reviewed the chart, labs and diagnostics and I agree with assessment and plan as above with resident team. note edited as above where required     S/P   cardiac cath done, Neg  s/p 1 U PRBC today for anemia of Chronic disease and KULWANT  Plan for EGD on Monday for further evaluation/work up of anemia  Daily HD today and tomorrow, because of pericardial rub associated with Uremic pericarditis  AVG placement scheduled for Wednesday. S/P PermCath placed 3/22  Outpatient HD-- establishing in community in process. Spoke with social work/case management today    Family called and updated

## 2018-03-24 LAB
ANION GAP SERPL CALC-SCNC: 12 MMOL/L — SIGNIFICANT CHANGE UP (ref 5–17)
BASOPHILS # BLD AUTO: 0 K/UL — SIGNIFICANT CHANGE UP (ref 0–0.2)
BASOPHILS NFR BLD AUTO: 0.5 % — SIGNIFICANT CHANGE UP (ref 0–2)
BUN SERPL-MCNC: 25 MG/DL — HIGH (ref 8–20)
CALCIUM SERPL-MCNC: 8 MG/DL — LOW (ref 8.6–10.2)
CHLORIDE SERPL-SCNC: 93 MMOL/L — LOW (ref 98–107)
CO2 SERPL-SCNC: 25 MMOL/L — SIGNIFICANT CHANGE UP (ref 22–29)
CREAT SERPL-MCNC: 3.38 MG/DL — HIGH (ref 0.5–1.3)
EOSINOPHIL # BLD AUTO: 0.2 K/UL — SIGNIFICANT CHANGE UP (ref 0–0.5)
EOSINOPHIL NFR BLD AUTO: 3.8 % — SIGNIFICANT CHANGE UP (ref 0–6)
ERYTHROCYTE [SEDIMENTATION RATE] IN BLOOD: 61 MM/HR — HIGH (ref 0–20)
GLUCOSE BLDC GLUCOMTR-MCNC: 142 MG/DL — HIGH (ref 70–99)
GLUCOSE BLDC GLUCOMTR-MCNC: 173 MG/DL — HIGH (ref 70–99)
GLUCOSE BLDC GLUCOMTR-MCNC: 175 MG/DL — HIGH (ref 70–99)
GLUCOSE BLDC GLUCOMTR-MCNC: 242 MG/DL — HIGH (ref 70–99)
GLUCOSE SERPL-MCNC: 175 MG/DL — HIGH (ref 70–115)
HCT VFR BLD CALC: 25.4 % — LOW (ref 37–47)
HGB BLD-MCNC: 8.1 G/DL — LOW (ref 12–16)
LYMPHOCYTES # BLD AUTO: 0.7 K/UL — LOW (ref 1–4.8)
LYMPHOCYTES # BLD AUTO: 10.1 % — LOW (ref 20–55)
MCHC RBC-ENTMCNC: 28 PG — SIGNIFICANT CHANGE UP (ref 27–31)
MCHC RBC-ENTMCNC: 31.9 G/DL — LOW (ref 32–36)
MCV RBC AUTO: 87.9 FL — SIGNIFICANT CHANGE UP (ref 81–99)
MONOCYTES # BLD AUTO: 1 K/UL — HIGH (ref 0–0.8)
MONOCYTES NFR BLD AUTO: 14.6 % — HIGH (ref 3–10)
NEUTROPHILS # BLD AUTO: 4.6 K/UL — SIGNIFICANT CHANGE UP (ref 1.8–8)
NEUTROPHILS NFR BLD AUTO: 70.2 % — SIGNIFICANT CHANGE UP (ref 37–73)
PLATELET # BLD AUTO: 351 K/UL — SIGNIFICANT CHANGE UP (ref 150–400)
POTASSIUM SERPL-MCNC: 3.9 MMOL/L — SIGNIFICANT CHANGE UP (ref 3.5–5.3)
POTASSIUM SERPL-SCNC: 3.9 MMOL/L — SIGNIFICANT CHANGE UP (ref 3.5–5.3)
RBC # BLD: 2.89 M/UL — LOW (ref 4.4–5.2)
RBC # FLD: 13.8 % — SIGNIFICANT CHANGE UP (ref 11–15.6)
SODIUM SERPL-SCNC: 130 MMOL/L — LOW (ref 135–145)
WBC # BLD: 6.6 K/UL — SIGNIFICANT CHANGE UP (ref 4.8–10.8)
WBC # FLD AUTO: 6.6 K/UL — SIGNIFICANT CHANGE UP (ref 4.8–10.8)

## 2018-03-24 PROCEDURE — 99233 SBSQ HOSP IP/OBS HIGH 50: CPT

## 2018-03-24 PROCEDURE — 99233 SBSQ HOSP IP/OBS HIGH 50: CPT | Mod: GC

## 2018-03-24 PROCEDURE — G0365: CPT | Mod: 26

## 2018-03-24 RX ADMIN — Medication 0.1 MILLIGRAM(S): at 17:41

## 2018-03-24 RX ADMIN — Medication 0: at 16:54

## 2018-03-24 RX ADMIN — Medication 0.6 MILLIGRAM(S): at 06:08

## 2018-03-24 RX ADMIN — Medication 1 DROP(S): at 06:06

## 2018-03-24 RX ADMIN — Medication 100 MILLIGRAM(S): at 06:05

## 2018-03-24 RX ADMIN — Medication 0.1 MILLIGRAM(S): at 16:36

## 2018-03-24 RX ADMIN — CARVEDILOL PHOSPHATE 12.5 MILLIGRAM(S): 80 CAPSULE, EXTENDED RELEASE ORAL at 21:17

## 2018-03-24 RX ADMIN — Medication 0.1 MILLIGRAM(S): at 06:05

## 2018-03-24 RX ADMIN — Medication 20 MILLIGRAM(S): at 06:06

## 2018-03-24 RX ADMIN — Medication 667 MILLIGRAM(S): at 11:32

## 2018-03-24 RX ADMIN — Medication 2: at 13:00

## 2018-03-24 RX ADMIN — PANTOPRAZOLE SODIUM 40 MILLIGRAM(S): 20 TABLET, DELAYED RELEASE ORAL at 06:06

## 2018-03-24 RX ADMIN — Medication 667 MILLIGRAM(S): at 13:16

## 2018-03-24 RX ADMIN — Medication 25 MILLIGRAM(S): at 21:17

## 2018-03-24 RX ADMIN — PANTOPRAZOLE SODIUM 40 MILLIGRAM(S): 20 TABLET, DELAYED RELEASE ORAL at 16:38

## 2018-03-24 RX ADMIN — Medication 10 MILLIGRAM(S): at 17:03

## 2018-03-24 RX ADMIN — ERYTHROPOIETIN 10000 UNIT(S): 10000 INJECTION, SOLUTION INTRAVENOUS; SUBCUTANEOUS at 16:41

## 2018-03-24 RX ADMIN — Medication 10 MILLIGRAM(S): at 06:06

## 2018-03-24 RX ADMIN — Medication 0.6 MILLIGRAM(S): at 21:17

## 2018-03-24 RX ADMIN — CARVEDILOL PHOSPHATE 12.5 MILLIGRAM(S): 80 CAPSULE, EXTENDED RELEASE ORAL at 06:06

## 2018-03-24 RX ADMIN — SIMVASTATIN 40 MILLIGRAM(S): 20 TABLET, FILM COATED ORAL at 22:39

## 2018-03-24 RX ADMIN — Medication 81 MILLIGRAM(S): at 13:16

## 2018-03-24 RX ADMIN — Medication 25 MILLIGRAM(S): at 06:05

## 2018-03-24 RX ADMIN — Medication 25 MILLIGRAM(S): at 13:16

## 2018-03-24 NOTE — PROGRESS NOTE ADULT - SUBJECTIVE AND OBJECTIVE BOX
Patient is a 57y old  Female who presents with a chief complaint of Nausea/vomiting and Chest pain (16 Mar 2018 17:20)      INTERVAL HPI/OVERNIGHT EVENTS:  57y  Female seen and examined at bedside. As per nurse overnight no complaints, patient has been doing well, slept well, no diarrhea or events. Patient denying CP, SOB, palpitations, leg swelling, lightheadedness/dizziness, paresthesias. Otherwise ROS unremarkable.       On monitor: NSR, no events      Allergies    No Known Allergies    Intolerances        Vital Signs Last 24 Hrs  T(C): 37.3 (24 Mar 2018 06:00), Max: 37.3 (24 Mar 2018 06:00)  T(F): 99.1 (24 Mar 2018 06:00), Max: 99.1 (24 Mar 2018 06:00)  HR: 78 (24 Mar 2018 06:00) (64 - 78)  BP: 162/84 (24 Mar 2018 06:00) (134/68 - 194/99)  BP(mean): --  RR: 18 (24 Mar 2018 06:00) (13 - 23)  SpO2: 97% (24 Mar 2018 06:00) (95% - 99%)      Daily     Daily Weight in k (23 Mar 2018 20:12)  I&O's Detail    23 Mar 2018 07:01  -  24 Mar 2018 07:00  --------------------------------------------------------  IN:    Oral Fluid: 180 mL  Total IN: 180 mL    OUT:    Other: 1000 mL  Total OUT: 1000 mL    Total NET: -820 mL      PHYSICAL EXAM:    GENERAL: NAD, well-groomed, well-developed  HEAD:  Atraumatic, Normocephalic  EYES: EOMI, PERRLA, conjunctiva and sclera clear  ENMT: No tonsillar erythema, exudates, or enlargement; Moist mucous membranes  NECK: Supple, No JVD, Normal thyroid  NERVOUS SYSTEM:  Alert & Oriented X3, Good concentration; Motor Strength 5/5 B/L upper and lower extremities; DTRs 2+ intact and symmetric  CHEST/LUNG: Clear to auscultation bilaterally; No rales, rhonchi, wheezing, or rubs  HEART: Regular rate and rhythm; No murmurs, rubs, or gallops  ABDOMEN: Soft, Nontender, Nondistended; Bowel sounds present  EXTREMITIES:  2+ Peripheral Pulses, No clubbing, cyanosis, or edema  LYMPH: No lymphadenopathy noted  SKIN: No rashes or lesions      LABS:                        7.7    6.8   )-----------( 364      ( 23 Mar 2018 05:47 )             24.1     CBC Full  -  ( 23 Mar 2018 05:47 )  WBC Count : 6.8 K/uL  Hemoglobin : 7.7 g/dL  Hematocrit : 24.1 %  Platelet Count - Automated : 364 K/uL  Mean Cell Volume : 87.0 fl  Mean Cell Hemoglobin : 27.8 pg  Mean Cell Hemoglobin Concentration : 32.0 g/dL  Auto Neutrophil # : 4.8 K/uL  Auto Lymphocyte # : 0.7 K/uL  Auto Monocyte # : 0.8 K/uL  Auto Eosinophil # : 0.4 K/uL  Auto Basophil # : 0.0 K/uL  Auto Neutrophil % : 70.3 %  Auto Lymphocyte % : 10.9 %  Auto Monocyte % : 12.5 %  Auto Eosinophil % : 5.4 %  Auto Basophil % : 0.3 %        130<L>  |  93<L>  |  25.0<H>  ----------------------------<  175<H>  3.9   |  25.0  |  3.38<H>    Ca    8.0<L>      24 Mar 2018 07:08  Phos  2.3     03-23  Mg     1.8     -23          Hemoglobin A1C, Whole Blood: 7.5 % ( @ 11:12)    Culture Results:   No growth at 2 days.  Culture in progress ( @ 11:30)  Culture Results:   No growth at 3 days.  Culture in progress ( @ 12:48)  Culture Results:   <10,000 CFU/ml Gram Positive Cocci in Clusters  <10,000 CFU/ml Gram Positive Rods ( @ 21:37)  Culture Results:   No growth at 5 days. ( @ 22:13)  Culture Results:   No growth at 5 days. ( @ 22:12)              RADIOLOGY & ADDITIONAL TESTS:    < from: Xray Chest 1 View AP/PA. (18 @ 11:51) >  IMPRESSION:    Support Devices: None  Heart: Cardiomegaly  Mediastinum:  Unremarkable  Lungs/Airways: Decreased left pleural effusion. No significant   pneumothorax. Multiple moderate right pleural effusion.  Bones/Soft tissues: Unremarkable          < end of copied text > Patient is a 57y old  Female who presents with a chief complaint of Nausea/vomiting and Chest pain (16 Mar 2018 17:20)      INTERVAL HPI/OVERNIGHT EVENTS:  57y  Female seen and examined at bedside. As per nurse overnight no complaints, patient has been doing well, slept well, no diarrhea or events. Patient denying CP, SOB, palpitations, leg swelling, lightheadedness/dizziness, paresthesias. Otherwise ROS unremarkable.       On monitor: NSR, no events      Allergies    No Known Allergies    Intolerances        Vital Signs Last 24 Hrs  T(C): 37.3 (24 Mar 2018 06:00), Max: 37.3 (24 Mar 2018 06:00)  T(F): 99.1 (24 Mar 2018 06:00), Max: 99.1 (24 Mar 2018 06:00)  HR: 78 (24 Mar 2018 06:00) (64 - 78)  BP: 162/84 (24 Mar 2018 06:00) (134/68 - 194/99)  BP(mean): --  RR: 18 (24 Mar 2018 06:00) (13 - 23)  SpO2: 97% (24 Mar 2018 06:00) (95% - 99%)      Daily     Daily Weight in k (23 Mar 2018 20:12)  I&O's Detail    23 Mar 2018 07:01  -  24 Mar 2018 07:00  --------------------------------------------------------  IN:    Oral Fluid: 180 mL  Total IN: 180 mL    OUT:    Other: 1000 mL  Total OUT: 1000 mL    Total NET: -820 mL      PHYSICAL EXAM:    GENERAL: NAD, well-groomed, well-developed  HEAD:  Atraumatic, Normocephalic  EYES: EOMI, PERRLA, conjunctiva and sclera clear  ENMT: No tonsillar erythema, exudates, or enlargement; Moist mucous membranes  NECK: Supple, No JVD, Normal thyroid  NERVOUS SYSTEM:  Alert & Oriented X3, Good concentration; Motor Strength 5/5 B/L upper and lower extremities; DTRs 2+ intact and symmetric  CHEST/LUNG: diffuse crackles to bases b/l ;+pericardial rub/systolic murmur audible grade II   HEART: Regular rate and rhythm; No murmurs, rubs, or gallops  ABDOMEN: Soft, Nontender, Nondistended; Bowel sounds present  EXTREMITIES:  2+ Peripheral Pulses, No clubbing, cyanosis, or edema  LYMPH: No lymphadenopathy noted  SKIN: No rashes or lesions      LABS:                        7.7    6.8   )-----------( 364      ( 23 Mar 2018 05:47 )             24.1     CBC Full  -  ( 23 Mar 2018 05:47 )  WBC Count : 6.8 K/uL  Hemoglobin : 7.7 g/dL  Hematocrit : 24.1 %  Platelet Count - Automated : 364 K/uL  Mean Cell Volume : 87.0 fl  Mean Cell Hemoglobin : 27.8 pg  Mean Cell Hemoglobin Concentration : 32.0 g/dL  Auto Neutrophil # : 4.8 K/uL  Auto Lymphocyte # : 0.7 K/uL  Auto Monocyte # : 0.8 K/uL  Auto Eosinophil # : 0.4 K/uL  Auto Basophil # : 0.0 K/uL  Auto Neutrophil % : 70.3 %  Auto Lymphocyte % : 10.9 %  Auto Monocyte % : 12.5 %  Auto Eosinophil % : 5.4 %  Auto Basophil % : 0.3 %        130<L>  |  93<L>  |  25.0<H>  ----------------------------<  175<H>  3.9   |  25.0  |  3.38<H>    Ca    8.0<L>      24 Mar 2018 07:08  Phos  2.3     03-23  Mg     1.8     -23          Hemoglobin A1C, Whole Blood: 7.5 % ( @ 11:12)    Culture Results:   No growth at 2 days.  Culture in progress ( @ 11:30)  Culture Results:   No growth at 3 days.  Culture in progress ( @ 12:48)  Culture Results:   <10,000 CFU/ml Gram Positive Cocci in Clusters  <10,000 CFU/ml Gram Positive Rods ( @ 21:37)  Culture Results:   No growth at 5 days. ( @ 22:13)  Culture Results:   No growth at 5 days. ( @ 22:12)              RADIOLOGY & ADDITIONAL TESTS:    < from: Xray Chest 1 View AP/PA. (18 @ 11:51) >  IMPRESSION:    Support Devices: None  Heart: Cardiomegaly  Mediastinum:  Unremarkable  Lungs/Airways: Decreased left pleural effusion. No significant   pneumothorax. Multiple moderate right pleural effusion.  Bones/Soft tissues: Unremarkable          < end of copied text > Patient is a 57y old  Female who presents with a chief complaint of Nausea/vomiting and Chest pain (16 Mar 2018 17:20)    INTERVAL HPI/OVERNIGHT EVENTS:  Patient is a 56 y/o Female seen and examined at bedside. As per nurse overnight no complaints, patient has been doing well, slept well, no diarrhea or events. Patient denying CP, SOB, palpitations, leg swelling, lightheadedness/dizziness, paresthesias. Otherwise ROS unremarkable.     On monitor: NSR, no events    Vital Signs Last 24 Hrs  T(C): 37.3 (24 Mar 2018 06:00), Max: 37.3 (24 Mar 2018 06:00)  T(F): 99.1 (24 Mar 2018 06:00), Max: 99.1 (24 Mar 2018 06:00)  HR: 78 (24 Mar 2018 06:00) (64 - 78)  BP: 162/84 (24 Mar 2018 06:00) (134/68 - 194/99)  RR: 18 (24 Mar 2018 06:00) (13 - 23)  SpO2: 97% (24 Mar 2018 06:00) (95% - 99%)  c/s 175-242    Daily     Daily Weight in k (23 Mar 2018 20:12)  I&O's Detail    23 Mar 2018 07:01  -  24 Mar 2018 07:00  --------------------------------------------------------  IN:    Oral Fluid: 180 mL  Total IN: 180 mL    OUT:    Other: 1000 mL  Total OUT: 1000 mL    Total NET: -820 mL    PHYSICAL EXAM:    GENERAL: NAD, well-groomed, well-developed  HEENT: eomi, perrla, MMM  CHEST/LUNG: fair b/l ae. b/l base crackles noted   HEART: S1S2nl, no r/g noted now. + 3/6 SM. No further pericardial rub noted   ABDOMEN: Soft, Nontender, Nondistended; Bowel sounds present  EXTREMITIES:  2+ Peripheral Pulses, No clubbing, cyanosis, or edema  Skin: right sided PC in place  NERVOUS SYSTEM:  Alert & Oriented X3, Good concentration; Motor Strength 5/5 B/L upper and lower extremities; DTRs 2+ intact and symmetric    LABS:                        7.7    6.8   )-----------( 364      ( 23 Mar 2018 05:47 )             24.1       03-    130<L>  |  93<L>  |  25.0<H>  ----------------------------<  175<H>  3.9   |  25.0  |  3.38<H>    Ca    8.0<L>      24 Mar 2018 07:08  Phos  2.3       Mg     1.8         Hemoglobin A1C, Whole Blood: 7.5 % ( @ 11:12)    Culture Results:   No growth at 2 days.  Culture in progress ( @ 11:30)  Culture Results:   No growth at 3 days.  Culture in progress ( @ 12:48)  Culture Results:   <10,000 CFU/ml Gram Positive Cocci in Clusters  <10,000 CFU/ml Gram Positive Rods ( @ 21:37)  Culture Results:   No growth at 5 days. ( @ 22:13)  Culture Results:   No growth at 5 days. ( @ 22:12)    RADIOLOGY & ADDITIONAL TESTS:    < from: Xray Chest 1 View AP/PA. (18 @ 11:51) >  IMPRESSION:    Support Devices: None  Heart: Cardiomegaly  Mediastinum:  Unremarkable  Lungs/Airways: Decreased left pleural effusion. No significant   pneumothorax. Multiple moderate right pleural effusion.  Bones/Soft tissues: Unremarkable

## 2018-03-24 NOTE — PROGRESS NOTE ADULT - ASSESSMENT
No overt bleeding.  Stool for OB was neg.  Multiple medical problems.  New uremic pericarditis.  Currently on 4th session of HD.  Will defer on EGD until later into the week.  Not yet cardiac cleared.  No change in multiple medications.

## 2018-03-24 NOTE — PROGRESS NOTE ADULT - ATTENDING COMMENTS
Note addended where needed. Plan discussed with patient with Maori speaking team member during rounds. Daughter in law called with update and plan of care with no questions asked.   will follow

## 2018-03-24 NOTE — PROGRESS NOTE ADULT - SUBJECTIVE AND OBJECTIVE BOX
Events noted : no acute complaints breathing improved    TELE: SR pacs    MEDICATIONS  (STANDING):  artificial  tears Solution 1 Drop(s) Both EYES every 12 hours  aspirin enteric coated 81 milliGRAM(s) Oral daily  calcium acetate 667 milliGRAM(s) Oral three times a day with meals  carvedilol 12.5 milliGRAM(s) Oral every 12 hours  cloNIDine 0.1 milliGRAM(s) Oral two times a day  colchicine 0.6 milliGRAM(s) Oral two times a day  dextrose 5%. 1000 milliLiter(s) (50 mL/Hr) IV Continuous <Continuous>  dextrose 50% Injectable 12.5 Gram(s) IV Push once  dextrose 50% Injectable 25 Gram(s) IV Push once  dextrose 50% Injectable 25 Gram(s) IV Push once  docusate sodium 100 milliGRAM(s) Oral two times a day  epoetin gian Injectable 69509 Unit(s) IV Push <User Schedule>  ergocalciferol 98704 Unit(s) Oral every week  furosemide    Tablet 20 milliGRAM(s) Oral daily  hydrALAZINE 25 milliGRAM(s) Oral every 8 hours  insulin lispro (HumaLOG) corrective regimen sliding scale   SubCutaneous three times a day before meals  insulin lispro (HumaLOG) corrective regimen sliding scale   SubCutaneous at bedtime  metoclopramide Injectable 10 milliGRAM(s) IV Push every 12 hours  pantoprazole    Tablet 40 milliGRAM(s) Oral two times a day  polyethylene glycol 3350 17 Gram(s) Oral daily  PPD  5 Tuberculin Unit(s) Injectable 5 Unit(s) IntraDermal once  senna 2 Tablet(s) Oral at bedtime  simvastatin 40 milliGRAM(s) Oral at bedtime    MEDICATIONS  (PRN):  acetaminophen   Tablet 650 milliGRAM(s) Oral every 6 hours PRN For Temp greater than 38 C (100.4 F)  acetaminophen   Tablet. 650 milliGRAM(s) Oral every 6 hours PRN Moderate Pain (4 - 6)  dextrose Gel 1 Dose(s) Oral once PRN Blood Glucose LESS THAN 70 milliGRAM(s)/deciliter  glucagon  Injectable 1 milliGRAM(s) IntraMuscular once PRN Glucose LESS THAN 70 milligrams/deciliter  hydrALAZINE Injectable 20 milliGRAM(s) IV Push every 6 hours PRN SBP > 160  ondansetron Injectable 4 milliGRAM(s) IV Push every 6 hours PRN Nausea      Allergies    No Known Allergies    Intolerances      PAST MEDICAL & SURGICAL HISTORY:  HLD (hyperlipidemia)  HTN (hypertension)  DM (diabetes mellitus)  No significant past surgical history      Vital Signs Last 24 Hrs  T(C): 37.3 (24 Mar 2018 06:00), Max: 37.3 (24 Mar 2018 06:00)  T(F): 99.1 (24 Mar 2018 06:00), Max: 99.1 (24 Mar 2018 06:00)  HR: 78 (24 Mar 2018 06:00) (64 - 78)  BP: 162/84 (24 Mar 2018 06:00) (134/68 - 194/99)  BP(mean): --  RR: 18 (24 Mar 2018 06:00) (13 - 23)  SpO2: 97% (24 Mar 2018 06:00) (95% - 99%)    Physical Exam:  Constitutional: NAD, AAOx3  Cardiovascular: +S1S2 RRR  Pulmonary: CTA b/l, unlabored  Abd: soft NTND +BS  Groins: C/D/I bilaterally; no bleeding, hematoma, edema  Extremities: no pedal edema, +distal pulses b/l  Neuro: non focal, KWONG x4    LABS:                        8.1    6.6   )-----------( 351      ( 24 Mar 2018 07:08 )             25.4     03-24    130<L>  |  93<L>  |  25.0<H>  ----------------------------<  175<H>  3.9   |  25.0  |  3.38<H>    Ca    8.0<L>      24 Mar 2018 07:08  Phos  2.3     03-23  Mg     1.8     03-23

## 2018-03-24 NOTE — PROGRESS NOTE ADULT - PROBLEM SELECTOR PLAN 7
HBA1C: 7.6   continue sliding scale,   -c/w Accu-Cheks ac hs tid   -c/w hypoglycemia protocol No chemical ac given severe anemia   SCDs while in bed  Ambulate with assistance

## 2018-03-24 NOTE — PROGRESS NOTE ADULT - PROBLEM SELECTOR PLAN 1
secondary to Uremic Pericarditis  clinically improved after regular HD, to receive HD today and yesterday (3/23)  continue furosemide 20mg po daily;    -Bilateral pleural effusions and pericardial effusion.  TTE with preserved EF,   -c/w supplemental o2 as needed;    -c/w colchicine 0.6mg po bid for presumed Uremic pericarditis. Per cardiology, Needs total 4 weeks and then stop  -Hemodialysis as per nephrology recommendations.  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 Per cardiology, Needs total 4 weeks and then stop  -Hemodialysis as per nephrology recommendations.  - s/p R thoracocentesis on 3/20 with 1100 cc removed  - L thoracocentesis done 3/21 with 1000 cc drained  Fluid analysis consistent with transudate

## 2018-03-24 NOTE — PROGRESS NOTE ADULT - ASSESSMENT
Ms. Elizabeth is a 58 yo Irish speaking female with hx of hypertension, hyperlipidemia, type 2 diabetes, with 1 month hx of vomiting and abdominal pain, with NSAID use.  Developed pleuritic chest pain, worsened in supine position and with deep breaths.  Chief complaint of N/V and dark stools noted to be in acute pulm edema/pericardial effusion with ARF, underwent US guided Right thoracentesis, mild discomfort noted, seen by Team and ordered Tylenol.  Also complained of no BM x 3 days, added sorbitol and informed PCP to follow.   Also cardiac cath cancelled yesterday due to respiratory distress.  NO EDG until cleared, GI following.        1. Pleural effusions - s/p bilateral thoracentesis (1L each side), breathing much improved, no acute complaints  2. Chest pain -  Her troponin is elevated, but CK is normal.  This is not consistent with acute coronary syndrome.  May have had demand ischemia in setting of blood loss anemia.  Probable underlying gastritis with concomitant pericarditis (pericardial rub, etc.).   Now on colchicine (would keep on for a total duration of 1 month). Keep Hb > 9.  Getting 1 unit pRBC today.   Given overall picture, elevated troponin, continued chest pain, needs ischemia evaluation.  Cath today after she receives transfusion  3.. Anemia - may be due to chronic disease,  GI workup pending.  CKD  Keep Hb > 9.  Getting 1 unit of pRBC today.  4. Hypertension- well controlled, continue carvedilol, hydralazine, clonidine, lasix.  Uptitrate as needed.    5.Percicardial effusion,  - hemodynamically stable, chronic.  6. MARSHALL-on CKD -   HD per renal  7. PAF - newly diagnosed; no AC for now given anemia, will need GI clearance; appreciate EP input regarding KELLI closure candidacy. Will perform GERALD prior to discharge to assess KELLI anatomy and if suitable for KELLI closure. 58 yo French speaking female with hx of hypertension, hyperlipidemia, type 2 diabetes, with 1 month hx of vomiting and abdominal pain, with NSAID MARSHALL on CKD , cardiac cath mild CAD no intervention, LVEF 55% on TTE currently in NSR. tolerating HD well. remains in [facility]. [facility] for GI eval.    1. Pleural effusions - s/p bilateral thoracentesis (1L each side), breathing much improved, no acute complaints  2 Anemia - may be due to chronic disease,  GI workup pending.  CKD  Keep Hb > 9.    4. Hypertension- well controlled, continue carvedilol, hydralazine, clonidine, lasix.  Uptitrate as needed.    5.Percicardial effusion,  - hemodynamically stable, chronic.  6. MARSHALL-on CKD -   HD per renal  7. PAF - newly diagnosed; no AC for now given anemia, will need GI clearance; appreciate EP input regarding KELLI closure candidacy. Will perform GERALD prior to discharge to assess KELLI anatomy and if suitable for KELLI closure.

## 2018-03-24 NOTE — PROGRESS NOTE ADULT - PROBLEM SELECTOR PLAN 4
Continue HD  Cardiology and Nephrology following  -continue colchicine  Per cardiology, Need colchicine for total 4 weeks from start date likely Anemia of chronic disease due to worsening acute on chronic renal insufficiency in the setting of CKD,  and concomitant  iron deficiency   -s/p 2 units pRBC on admission with improvement in h/h , another unit pRBC 3/23  -occult stool neg  -c/w ppi po bid   -GI consulted, planning on EGD on Monday; cleared by Cardio   continue EPOGEN with HD and ferrous sulfate

## 2018-03-24 NOTE — PROGRESS NOTE ADULT - SUBJECTIVE AND OBJECTIVE BOX
Patient seen and examined;recent events noted. Has developed uremic pericarditis.  Now on HD and planned for q 2 days for next week as per discussion with Dr Gay.  Cat earlier today neg for CAD.  Swollen rt arm.  Had permacath done 2 days ago, rt subclavian approach.    Duplex:  No DVT in RUE.  No bleeding  Poor appetite.  Had B/L thoracentesis for benign fluid 1100 ans 1000 cc removed.  Breathing more comfortably.    PAST MEDICAL & SURGICAL HISTORY:  HLD (hyperlipidemia)  HTN (hypertension)  DM (diabetes mellitus)  No significant past surgical history      ROS:  No Heartburn, regurgitation, dysphagia, odynophagia.  No dyspepsia  No abdominal pain.    No Nausea, vomiting.  No Bleeding.  No hematemesis.   No diarrhea.    No hematochesia.  No weight loss, anorexia.  No edema.      MEDICATIONS  (STANDING):  artificial  tears Solution 1 Drop(s) Both EYES every 12 hours  aspirin enteric coated 81 milliGRAM(s) Oral daily  calcium acetate 667 milliGRAM(s) Oral three times a day with meals  carvedilol 12.5 milliGRAM(s) Oral every 12 hours  cloNIDine 0.1 milliGRAM(s) Oral two times a day  colchicine 0.6 milliGRAM(s) Oral two times a day  dextrose 5%. 1000 milliLiter(s) (50 mL/Hr) IV Continuous <Continuous>  dextrose 50% Injectable 12.5 Gram(s) IV Push once  dextrose 50% Injectable 25 Gram(s) IV Push once  dextrose 50% Injectable 25 Gram(s) IV Push once  docusate sodium 100 milliGRAM(s) Oral two times a day  epoetin gian Injectable 31535 Unit(s) IV Push <User Schedule>  ergocalciferol 14120 Unit(s) Oral every week  hydrALAZINE 25 milliGRAM(s) Oral every 8 hours  insulin lispro (HumaLOG) corrective regimen sliding scale   SubCutaneous three times a day before meals  insulin lispro (HumaLOG) corrective regimen sliding scale   SubCutaneous at bedtime  metoclopramide Injectable 10 milliGRAM(s) IV Push every 12 hours  pantoprazole    Tablet 40 milliGRAM(s) Oral two times a day  polyethylene glycol 3350 17 Gram(s) Oral daily  PPD  5 Tuberculin Unit(s) Injectable 5 Unit(s) IntraDermal once  senna 2 Tablet(s) Oral at bedtime  simvastatin 40 milliGRAM(s) Oral at bedtime    MEDICATIONS  (PRN):  acetaminophen   Tablet 650 milliGRAM(s) Oral every 6 hours PRN For Temp greater than 38 C (100.4 F)  acetaminophen   Tablet. 650 milliGRAM(s) Oral every 6 hours PRN Moderate Pain (4 - 6)  dextrose Gel 1 Dose(s) Oral once PRN Blood Glucose LESS THAN 70 milliGRAM(s)/deciliter  glucagon  Injectable 1 milliGRAM(s) IntraMuscular once PRN Glucose LESS THAN 70 milligrams/deciliter  hydrALAZINE Injectable 20 milliGRAM(s) IV Push every 6 hours PRN SBP > 160  ondansetron Injectable 4 milliGRAM(s) IV Push every 6 hours PRN Nausea      Allergies    No Known Allergies    Intolerances        Vital Signs Last 24 Hrs  T(C): 36.8 (24 Mar 2018 15:45), Max: 37.3 (24 Mar 2018 06:00)  T(F): 98.2 (24 Mar 2018 15:45), Max: 99.1 (24 Mar 2018 06:00)  HR: 67 (24 Mar 2018 15:45) (67 - 78)  BP: 193/97 (24 Mar 2018 15:45) (140/75 - 193/97)  BP(mean): --  RR: 18 (24 Mar 2018 15:45) (18 - 18)  SpO2: 97% (24 Mar 2018 15:45) (97% - 98%)    PHYSICAL EXAM:    GENERAL: NAD, well-groomed, well-developed  HEAD:  Atraumatic, Normocephalic  EYES: EOMI, PERRLA, conjunctiva and sclera clear  ENMT: No tonsillar erythema, exudates, or enlargement; Moist mucous membranes, Good dentition, No lesions  NECK: Supple, No JVD, Normal thyroid  CHEST/LUNG: Clear to percussion bilaterally; No rales, rhonchi, wheezing, or rubs  HEART: Regular rate and rhythm; No murmurs, rubs, or gallops  ABDOMEN: Soft, Nontender, Nondistended; Bowel sounds present  EXTREMITIES:  2+ Peripheral Pulses, No clubbing, cyanosis, or edema  LYMPH: No lymphadenopathy noted  SKIN: No rashes or lesions      LABS:                        8.1    6.6   )-----------( 351      ( 24 Mar 2018 07:08 )             25.4     03-24    130<L>  |  93<L>  |  25.0<H>  ----------------------------<  175<H>  3.9   |  25.0  |  3.38<H>    Ca    8.0<L>      24 Mar 2018 07:08  Phos  2.3     03-23  Mg     1.8     03-23               RADIOLOGY & ADDITIONAL STUDIES:

## 2018-03-24 NOTE — PROGRESS NOTE ADULT - SUBJECTIVE AND OBJECTIVE BOX
Post cath  Intepreter via phone used     Patient states she is less sob  right arm is edematous with ecchymosis  patient states it was swollen prior to the cath  Good brachial pulse, + radial pulse present  Ecchymosis inner elbow  Telemetry  sinus with pvcs  apcs  Cath Global left ventricular function was normal. EF calculated  by contrast ventriculography was 55 %.  VALVES: AORTIC VALVE: No significant aortic valve gradient. MITRAL VALVE:  The mitral valve exhibited trivial regurgitation (less than 1+).  CORONARY VESSELS: The coronary circulation is right dominant.  LM:   --  LM: Normal. The vessel was normal sized, not calcified, and not  tortuous. Angiography showed no evidence of disease.  LAD:   --  LAD: Normal. The vessel was normal sized, not calcified, and not  tortuous. Angiography showed minor luminal irregularities with no flow  limiting lesions.  CX:   --  Circumflex: Normal. The vessel was normal sized, not calcified,  and excessively tortuous. Angiography showed no evidence of disease.  RCA:   --  RCA: Normal. The vessel was normal sized, not calcified, and  moderately tortuous. Angiography showed no evidence of disease.  COMPLICATIONS: There were no complications. No complications occurred  during the cath lab visit.  DIAGNOSTIC IMPRESSIONS: There is mild irregularity of the coronary anatomy.  Left ventricular function is normal (LVEF=55%).    discussed cath findings with patient She has no questions  Aware she is going for dialysis today and will have to wait for seat in the community  Advsed arm elevation

## 2018-03-24 NOTE — PROGRESS NOTE ADULT - PROBLEM SELECTOR PLAN 5
likely Anemia of chronic disease due to worsening acute on chronic renal insufficiency in the setting of CKD,  and concomitant  iron deficiency   -s/p 2 units pRBC on admission with improvement in h/h , another unit pRBC 3/23  -occult stool neg  -c/w ppi po bid   -GI consulted, planning on EGD on Monday  continue EPOGEN with HD and ferrous sulfate -c/w clonidine and will up titrate as needed  -c/w hydralazine 25mgpo q 8hrs  - continue coreg and Lasix   HD also helping with HTN

## 2018-03-24 NOTE — PROGRESS NOTE ADULT - PROBLEM SELECTOR PLAN 2
baseline CKD 3 now progressed to ESRD requiring HD: etiology uremic pericarditis requiring dialysis  -Acute hepatitis panel negative,   -ppd at 48 hours 0mm negative thus far   -Permacath placed  03/22/2018 per [facility] surgery.  -HD per renal  -planning on AVF/AVG, tentative on 03/28/2018 by vascular  -EPO weekly added on 03/22/18 per nephro baseline CKD 3 now progressed to ESRD requiring HD: with uremic pericarditis requiring dialysis  -Acute hepatitis panel negative,   -ppd at 48 hours 0mm negative thus far   -Permacath placed  03/22/2018 per [facility] surgery.  -HD per renal  -planning on AVF/AVG, tentative on 03/28/2018 by vascular  -EPO weekly added on 03/22/18 per nephro

## 2018-03-24 NOTE — PROGRESS NOTE ADULT - PROBLEM SELECTOR PLAN 6
-c/w clonidine and will up titrate as needed  -c/w hydralazine 25mgpo q 8hrs  - continue coreg and Lasix   HD also helping with HTN HBA1C: 7.6   continue sliding scale,   -c/w Accu-Cheks ac hs tid   -c/w hypoglycemia protocol

## 2018-03-24 NOTE — PROGRESS NOTE ADULT - ASSESSMENT
57 y.o. F with hx of HTN, HLD, DMII, hyperkalemia, ckd 3 who presented with 3 weeks of postprandial vomiting and abd pain, noted to have ARF, fluid overload with bilateral pleural effusions and pericardial effusion as well as symptomatic anemia likely multifactorial secondary to acute on chronic renal failure and suspected upper GI Bleeding from vomiting/retching and NSAID use. Clinical concern that worsening acute on chronic renal failure possibly caused uremia related sx of N/V, with patients NSAID use and retching suspected GI bleed but occult blood returned negative, for dropping h/h pt received 2 u prbc on admission with appropriate response to therapy. 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 In the interim while being dialyzed the patient had an episode of PAF, s/p cardizem became rate controlled and remained in NSR. EP consulted. Unable to AC the patient due to initial concern for GI bleeding, for which GI consulted and pt pending possible GI intervention when respiratory status improves. Clinical concern for ARF causing volume overload, unclear if HFPEF is a contributing factor.     S/p R thoracocentesis on 3/20 with 1100 cc removed  S/Ppleurocentesis done 3/21 on left sided with removal of 1 L. Fluid analysis consistent with transudate   Plan for Cath today, NEG  Planning for endoscopy by GI, workup for anemia   and A/V fistula on 03/28/2018 as per vascular surgery

## 2018-03-24 NOTE — PROGRESS NOTE ADULT - SUBJECTIVE AND OBJECTIVE BOX
NEPHROLOGY INTERVAL HPI/OVERNIGHT EVENTS:  \  No new events.    MEDICATIONS  (STANDING):  artificial  tears Solution 1 Drop(s) Both EYES every 12 hours  aspirin enteric coated 81 milliGRAM(s) Oral daily  calcium acetate 667 milliGRAM(s) Oral three times a day with meals  carvedilol 12.5 milliGRAM(s) Oral every 12 hours  cloNIDine 0.1 milliGRAM(s) Oral two times a day  colchicine 0.6 milliGRAM(s) Oral two times a day  dextrose 5%. 1000 milliLiter(s) (50 mL/Hr) IV Continuous <Continuous>  dextrose 50% Injectable 12.5 Gram(s) IV Push once  dextrose 50% Injectable 25 Gram(s) IV Push once  dextrose 50% Injectable 25 Gram(s) IV Push once  docusate sodium 100 milliGRAM(s) Oral two times a day  epoetin gian Injectable 91138 Unit(s) IV Push <User Schedule>  ergocalciferol 29696 Unit(s) Oral every week  furosemide    Tablet 20 milliGRAM(s) Oral daily  hydrALAZINE 25 milliGRAM(s) Oral every 8 hours  insulin lispro (HumaLOG) corrective regimen sliding scale   SubCutaneous three times a day before meals  insulin lispro (HumaLOG) corrective regimen sliding scale   SubCutaneous at bedtime  metoclopramide Injectable 10 milliGRAM(s) IV Push every 12 hours  pantoprazole    Tablet 40 milliGRAM(s) Oral two times a day  polyethylene glycol 3350 17 Gram(s) Oral daily  PPD  5 Tuberculin Unit(s) Injectable 5 Unit(s) IntraDermal once  senna 2 Tablet(s) Oral at bedtime  simvastatin 40 milliGRAM(s) Oral at bedtime    MEDICATIONS  (PRN):  acetaminophen   Tablet 650 milliGRAM(s) Oral every 6 hours PRN For Temp greater than 38 C (100.4 F)  acetaminophen   Tablet. 650 milliGRAM(s) Oral every 6 hours PRN Moderate Pain (4 - 6)  dextrose Gel 1 Dose(s) Oral once PRN Blood Glucose LESS THAN 70 milliGRAM(s)/deciliter  glucagon  Injectable 1 milliGRAM(s) IntraMuscular once PRN Glucose LESS THAN 70 milligrams/deciliter  hydrALAZINE Injectable 20 milliGRAM(s) IV Push every 6 hours PRN SBP > 160  ondansetron Injectable 4 milliGRAM(s) IV Push every 6 hours PRN Nausea      Allergies    No Known Allergies    Intolerances                Vital Signs Last 24 Hrs    T(C): 37.3 (24 Mar 2018 06:00), Max: 37.3 (24 Mar 2018 06:00)  T(F): 99.1 (24 Mar 2018 06:00), Max: 99.1 (24 Mar 2018 06:00)  HR: 78 (24 Mar 2018 06:00) (64 - 78)  BP: 162/84 (24 Mar 2018 06:00) (134/68 - 194/99)  BP(mean): --  RR: 18 (24 Mar 2018 06:00) (13 - 23)  SpO2: 97% (24 Mar 2018 06:00) (95% - 99%)  T(C): 36.7 (22 Mar 2018 05:20), Max: 36.8 (21 Mar 2018 21:01)  T(F): 98 (22 Mar 2018 05:20), Max: 98.2 (21 Mar 2018 21:01)  HR: 79 (22 Mar 2018 05:20) (66 - 79)  BP: 194/94 (22 Mar 2018 05:20) (141/72 - 194/94)  BP(mean): --  RR: 16 (22 Mar 2018 05:20) (16 - 18)  SpO2: 99% (22 Mar 2018 05:20) (92% - 100%)    PHYSICAL EXAM:  GENERAL: Weakness  HEAD:  Periorbital edema  EYES: EOMI, PERRLA  NECK: Supple, No JVD, right permacath  site  clean  NERVOUS SYSTEM:  Alert & Oriented X3,  intact and symmetric  CHEST/LUNG: Diminished BS bilaterally (L > R)   HEART: Regular rate and rhythm; No rub heard today  ABDOMEN: Soft, +BS  EXTREMITIES:  trace edema  SKIN: No rashes or lesions      LABS:    03-24    130<L>  |  93<L>  |  25.0<H>  ----------------------------<  175<H>  3.9   |  25.0  |  3.38<H>    Ca    8.0<L>      24 Mar 2018 07:08  Phos  2.3     03-23  Mg     1.8     03-23                            8.9    8.1   )-----------( 514      ( 22 Mar 2018 06:35 )             26.9     03-22    127<L>  |  89<L>  |  37.0<H>  ----------------------------<  154<H>  4.0   |  22.0  |  5.21<H>    Ca    8.0<L>      22 Mar 2018 06:35    TPro  6.0<L>  /  Alb  2.1<L>  /  TBili  0.3<L>  /  DBili  x   /  AST  62<H>  /  ALT  39<H>  /  AlkPhos  121<H>  03-20            RADIOLOGY & ADDITIONAL TESTS:

## 2018-03-24 NOTE — PROGRESS NOTE ADULT - PROBLEM SELECTOR PLAN 3
-No further episodes of AFib  -no AC at this time until GI work up completed  [facility] Cardiology following  Rates well controlled on b blocker  S/P cath, neg ( done because of chest Pain and Mild elevated troponin on admission )

## 2018-03-25 DIAGNOSIS — M79.89 OTHER SPECIFIED SOFT TISSUE DISORDERS: ICD-10-CM

## 2018-03-25 LAB
ANION GAP SERPL CALC-SCNC: 12 MMOL/L — SIGNIFICANT CHANGE UP (ref 5–17)
BASOPHILS # BLD AUTO: 0 K/UL — SIGNIFICANT CHANGE UP (ref 0–0.2)
BASOPHILS NFR BLD AUTO: 0.1 % — SIGNIFICANT CHANGE UP (ref 0–2)
BUN SERPL-MCNC: 13 MG/DL — SIGNIFICANT CHANGE UP (ref 8–20)
CALCIUM SERPL-MCNC: 8 MG/DL — LOW (ref 8.6–10.2)
CHLORIDE SERPL-SCNC: 96 MMOL/L — LOW (ref 98–107)
CO2 SERPL-SCNC: 26 MMOL/L — SIGNIFICANT CHANGE UP (ref 22–29)
CREAT SERPL-MCNC: 2.58 MG/DL — HIGH (ref 0.5–1.3)
CULTURE RESULTS: SIGNIFICANT CHANGE UP
EOSINOPHIL # BLD AUTO: 0.2 K/UL — SIGNIFICANT CHANGE UP (ref 0–0.5)
EOSINOPHIL NFR BLD AUTO: 2.8 % — SIGNIFICANT CHANGE UP (ref 0–6)
GLUCOSE BLDC GLUCOMTR-MCNC: 165 MG/DL — HIGH (ref 70–99)
GLUCOSE BLDC GLUCOMTR-MCNC: 185 MG/DL — HIGH (ref 70–99)
GLUCOSE BLDC GLUCOMTR-MCNC: 192 MG/DL — HIGH (ref 70–99)
GLUCOSE BLDC GLUCOMTR-MCNC: 250 MG/DL — HIGH (ref 70–99)
GLUCOSE SERPL-MCNC: 154 MG/DL — HIGH (ref 70–115)
HCT VFR BLD CALC: 25.7 % — LOW (ref 37–47)
HGB BLD-MCNC: 8.3 G/DL — LOW (ref 12–16)
LYMPHOCYTES # BLD AUTO: 0.9 K/UL — LOW (ref 1–4.8)
LYMPHOCYTES # BLD AUTO: 12.8 % — LOW (ref 20–55)
MCHC RBC-ENTMCNC: 28.3 PG — SIGNIFICANT CHANGE UP (ref 27–31)
MCHC RBC-ENTMCNC: 32.3 G/DL — SIGNIFICANT CHANGE UP (ref 32–36)
MCV RBC AUTO: 87.7 FL — SIGNIFICANT CHANGE UP (ref 81–99)
MONOCYTES # BLD AUTO: 1 K/UL — HIGH (ref 0–0.8)
MONOCYTES NFR BLD AUTO: 14.2 % — HIGH (ref 3–10)
NEUTROPHILS # BLD AUTO: 4.9 K/UL — SIGNIFICANT CHANGE UP (ref 1.8–8)
NEUTROPHILS NFR BLD AUTO: 69.4 % — SIGNIFICANT CHANGE UP (ref 37–73)
PLATELET # BLD AUTO: 381 K/UL — SIGNIFICANT CHANGE UP (ref 150–400)
POTASSIUM SERPL-MCNC: 4 MMOL/L — SIGNIFICANT CHANGE UP (ref 3.5–5.3)
POTASSIUM SERPL-SCNC: 4 MMOL/L — SIGNIFICANT CHANGE UP (ref 3.5–5.3)
RBC # BLD: 2.93 M/UL — LOW (ref 4.4–5.2)
RBC # FLD: 13.8 % — SIGNIFICANT CHANGE UP (ref 11–15.6)
SODIUM SERPL-SCNC: 134 MMOL/L — LOW (ref 135–145)
SPECIMEN SOURCE: SIGNIFICANT CHANGE UP
WBC # BLD: 7.1 K/UL — SIGNIFICANT CHANGE UP (ref 4.8–10.8)
WBC # FLD AUTO: 7.1 K/UL — SIGNIFICANT CHANGE UP (ref 4.8–10.8)

## 2018-03-25 PROCEDURE — 93931 UPPER EXTREMITY STUDY: CPT | Mod: 26,RT

## 2018-03-25 PROCEDURE — 99233 SBSQ HOSP IP/OBS HIGH 50: CPT

## 2018-03-25 PROCEDURE — 76882 US LMTD JT/FCL EVL NVASC XTR: CPT | Mod: 26,RT

## 2018-03-25 PROCEDURE — 99233 SBSQ HOSP IP/OBS HIGH 50: CPT | Mod: GC

## 2018-03-25 PROCEDURE — 93971 EXTREMITY STUDY: CPT | Mod: 26,RT

## 2018-03-25 RX ADMIN — Medication 667 MILLIGRAM(S): at 08:28

## 2018-03-25 RX ADMIN — Medication 10 MILLIGRAM(S): at 17:22

## 2018-03-25 RX ADMIN — Medication 1: at 08:27

## 2018-03-25 RX ADMIN — Medication 667 MILLIGRAM(S): at 12:13

## 2018-03-25 RX ADMIN — Medication 25 MILLIGRAM(S): at 05:59

## 2018-03-25 RX ADMIN — Medication 0.1 MILLIGRAM(S): at 05:59

## 2018-03-25 RX ADMIN — Medication 1 DROP(S): at 05:55

## 2018-03-25 RX ADMIN — Medication 81 MILLIGRAM(S): at 12:13

## 2018-03-25 RX ADMIN — Medication 1: at 17:31

## 2018-03-25 RX ADMIN — SENNA PLUS 2 TABLET(S): 8.6 TABLET ORAL at 21:19

## 2018-03-25 RX ADMIN — Medication 25 MILLIGRAM(S): at 21:19

## 2018-03-25 RX ADMIN — CARVEDILOL PHOSPHATE 12.5 MILLIGRAM(S): 80 CAPSULE, EXTENDED RELEASE ORAL at 17:23

## 2018-03-25 RX ADMIN — CARVEDILOL PHOSPHATE 12.5 MILLIGRAM(S): 80 CAPSULE, EXTENDED RELEASE ORAL at 05:55

## 2018-03-25 RX ADMIN — PANTOPRAZOLE SODIUM 40 MILLIGRAM(S): 20 TABLET, DELAYED RELEASE ORAL at 05:59

## 2018-03-25 RX ADMIN — Medication 0.1 MILLIGRAM(S): at 17:23

## 2018-03-25 RX ADMIN — Medication 25 MILLIGRAM(S): at 12:14

## 2018-03-25 RX ADMIN — Medication 0.6 MILLIGRAM(S): at 17:23

## 2018-03-25 RX ADMIN — Medication 0.6 MILLIGRAM(S): at 05:55

## 2018-03-25 RX ADMIN — Medication 10 MILLIGRAM(S): at 05:55

## 2018-03-25 RX ADMIN — Medication 100 MILLIGRAM(S): at 17:23

## 2018-03-25 RX ADMIN — Medication 1: at 12:13

## 2018-03-25 RX ADMIN — Medication 667 MILLIGRAM(S): at 17:22

## 2018-03-25 RX ADMIN — SIMVASTATIN 40 MILLIGRAM(S): 20 TABLET, FILM COATED ORAL at 21:19

## 2018-03-25 RX ADMIN — PANTOPRAZOLE SODIUM 40 MILLIGRAM(S): 20 TABLET, DELAYED RELEASE ORAL at 17:23

## 2018-03-25 RX ADMIN — Medication 1 DROP(S): at 17:22

## 2018-03-25 NOTE — PROGRESS NOTE ADULT - PROBLEM SELECTOR PLAN 3
-No further episodes of AFib  -no AC at this time until GI work up completed  [facility]iology following  Rates well controlled on b blocker  S/P cath, neg ( done because of chest Pain and Mild elevated troponin on admission ) post procedure with pulses & movement in tact, along with normal temperature  will check US arm to evaluate for hematoma, dvt

## 2018-03-25 NOTE — PROGRESS NOTE ADULT - PROBLEM SELECTOR PLAN 7
No chemical ac given severe anemia   SCDs while in bed  Ambulate with assistance HBA1C: 7.6   continue sliding scale. 2 UI required in 24 rhs  -c/w Accu-Cheks ac hs tid   -c/w hypoglycemia protocol

## 2018-03-25 NOTE — PROGRESS NOTE ADULT - SUBJECTIVE AND OBJECTIVE BOX
INTERVAL HPI/OVERNIGHT EVENTS/SUBJECTIVE: 58yo female w/ ARF examined at bedside, pt and nurse state that there were no acute events or complaints overnight. Pt states that pain is well controlled and is tolerating current diet w/o any n/v/d. Denies fever, chills, CP, SOB. Explained to patient with Dr. Bear that she will be going to OR this Wednesday 3/28 for creation of Left AVF.     ICU Vital Signs Last 24 Hrs  T(C): 37 (25 Mar 2018 10:00), Max: 37.2 (24 Mar 2018 21:06)  T(F): 98.6 (25 Mar 2018 10:00), Max: 98.9 (24 Mar 2018 21:06)  HR: 78 (25 Mar 2018 10:00) (67 - 80)  BP: 138/70 (25 Mar 2018 10:00) (138/70 - 193/97)  RR: 16 (25 Mar 2018 10:00) (16 - 18)  SpO2: 95% (25 Mar 2018 10:00) (93% - 97%)      I&O's Detail    24 Mar 2018 07:01  -  25 Mar 2018 07:00  --------------------------------------------------------  IN:    Oral Fluid: 360 mL  Total IN: 360 mL    OUT:    Other: 1500 mL  Total OUT: 1500 mL    Total NET: -1140 mL                MEDICATIONS  (STANDING):  artificial  tears Solution 1 Drop(s) Both EYES every 12 hours  aspirin enteric coated 81 milliGRAM(s) Oral daily  calcium acetate 667 milliGRAM(s) Oral three times a day with meals  carvedilol 12.5 milliGRAM(s) Oral every 12 hours  cloNIDine 0.1 milliGRAM(s) Oral two times a day  colchicine 0.6 milliGRAM(s) Oral two times a day  dextrose 5%. 1000 milliLiter(s) (50 mL/Hr) IV Continuous <Continuous>  dextrose 50% Injectable 12.5 Gram(s) IV Push once  dextrose 50% Injectable 25 Gram(s) IV Push once  dextrose 50% Injectable 25 Gram(s) IV Push once  docusate sodium 100 milliGRAM(s) Oral two times a day  epoetin gian Injectable 07661 Unit(s) IV Push <User Schedule>  ergocalciferol 18033 Unit(s) Oral every week  hydrALAZINE 25 milliGRAM(s) Oral every 8 hours  insulin lispro (HumaLOG) corrective regimen sliding scale   SubCutaneous three times a day before meals  insulin lispro (HumaLOG) corrective regimen sliding scale   SubCutaneous at bedtime  metoclopramide Injectable 10 milliGRAM(s) IV Push every 12 hours  pantoprazole    Tablet 40 milliGRAM(s) Oral two times a day  polyethylene glycol 3350 17 Gram(s) Oral daily  PPD  5 Tuberculin Unit(s) Injectable 5 Unit(s) IntraDermal once  senna 2 Tablet(s) Oral at bedtime  simvastatin 40 milliGRAM(s) Oral at bedtime    MEDICATIONS  (PRN):  acetaminophen   Tablet 650 milliGRAM(s) Oral every 6 hours PRN For Temp greater than 38 C (100.4 F)  acetaminophen   Tablet. 650 milliGRAM(s) Oral every 6 hours PRN Moderate Pain (4 - 6)  dextrose Gel 1 Dose(s) Oral once PRN Blood Glucose LESS THAN 70 milliGRAM(s)/deciliter  glucagon  Injectable 1 milliGRAM(s) IntraMuscular once PRN Glucose LESS THAN 70 milligrams/deciliter  hydrALAZINE Injectable 20 milliGRAM(s) IV Push every 6 hours PRN SBP > 160  ondansetron Injectable 4 milliGRAM(s) IV Push every 6 hours PRN Nausea      NUTRITION/IVF: Consistent carb no evening snack    PHYSICAL EXAM:    Gen: Alert, NAD, Mentating well    Eyes: EOMI, PERRL    Neurological: A&Ox3, GCS 15, Baseline mental status    ENMT: Nares patent w/o discharge, MMM, no LAD     Neck: supple, no masses appreciated, Trachea midline, No JVD    Pulmonary: CTA BL, Non labored, chest rise is  symmetrical with respiration    Cardiovascular: S1S2 RRR    Gastrointestinal: Abdomen soft and non tympanic, NTND, +BS x4    Genitourinary: No Morton, no discharge or rash.    Back: No CVA tenderness    Extremities: No gross deformities or angulations, No calf tenderness, Distal pulses intact, Capillary refill < 2sec    Skin: Warm and dry, no rash.     Musculoskeletal: No focal deficits, strength 5/5 through out all extremities bilaterally, Baseline ROM.          LABS:  CBC Full  -  ( 25 Mar 2018 05:33 )  WBC Count : 7.1 K/uL  Hemoglobin : 8.3 g/dL  Hematocrit : 25.7 %  Platelet Count - Automated : 381 K/uL  Mean Cell Volume : 87.7 fl  Mean Cell Hemoglobin : 28.3 pg  Mean Cell Hemoglobin Concentration : 32.3 g/dL  Auto Neutrophil # : 4.9 K/uL  Auto Lymphocyte # : 0.9 K/uL  Auto Monocyte # : 1.0 K/uL  Auto Eosinophil # : 0.2 K/uL  Auto Basophil # : 0.0 K/uL  Auto Neutrophil % : 69.4 %  Auto Lymphocyte % : 12.8 %  Auto Monocyte % : 14.2 %  Auto Eosinophil % : 2.8 %  Auto Basophil % : 0.1 %    03-25    134<L>  |  96<L>  |  13.0  ----------------------------<  154<H>  4.0   |  26.0  |  2.58<H>    Ca    8.0<L>      25 Mar 2018 05:34          RECENT CULTURES:  03-21 .Body Fluid Pleural Fluid XXXX   Few White blood cells  No organisms seen   No growth at 4 days.  Culture in progress    03-20 .Body Fluid Pleural Fluid XXXX   No White blood cells  No organisms seen   No growth at 5 days.              CAPILLARY BLOOD GLUCOSE      RADIOLOGY & ADDITIONAL STUDIES:    ASSESSMENT/PLAN:  57yFemale presenting with: ARF  Cardio: Monitor Vital signs; Continue home meds  Pulm: Breathing Independently; Incentive spirometry and deep breathing   GI: Diet: [consistent carb ]  : Urinating independently  MS: Encourage OOB and ambulation  DVT ppx: ASA  Plan for OR 3/28 with Dr. Segundo for Creation of Left AVF  Plan discussed with Dr. Bear

## 2018-03-25 NOTE — PROGRESS NOTE ADULT - ASSESSMENT
57 y.o. F with hx of HTN, HLD, DMII, hyperkalemia, ckd 3 who presented with 3 weeks of postprandial vomiting and abd pain, noted to have ARF, fluid overload with bilateral pleural effusions and pericardial effusion as well as symptomatic anemia likely multifactorial secondary to acute on chronic renal failure and suspected upper GI Bleeding from vomiting/retching and NSAID use. Clinical concern that worsening acute on chronic renal failure possibly caused uremia related sx of N/V, with patients NSAID use and retching suspected GI bleed but occult blood returned negative, for dropping h/h pt received 2 u prbc on admission with appropriate response to therapy. 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 In the interim while being dialyzed the patient had an episode of PAF, s/p cardizem became rate controlled and remained in NSR. EP consulted. Unable to AC the patient due to initial concern for GI bleeding, for which GI consulted and pt pending possible GI intervention when respiratory status improves. Clinical concern for ARF causing volume overload, unclear if HFPEF is a contributing factor. Now presenting right arm ecchymosis on the cath side.     S/p R thoracocentesis on 3/20 with 1100 cc removed  S/Ppleurocentesis done 3/21 on left sided with removal of 1 L. Fluid analysis consistent with transudate   s/p cath on 03/24/2018, NEG, EF 55%  Planning for endoscopy by GI, workup for anemia   and A/V fistula on 03/28/2018 as per vascular surgery 57 y.o. F with hx of HTN, HLD, DMII, hyperkalemia, ckd 3 who presented with 3 weeks of postprandial vomiting and abd pain, noted to have ARF, fluid overload with bilateral pleural effusions and pericardial effusion as well as symptomatic anemia likely multifactorial secondary to acute on chronic renal failure and suspected upper GI Bleeding from vomiting/retching and NSAID use. Clinical concern that worsening acute on chronic renal failure possibly caused uremia related sx of N/V, with patients NSAID use and retching suspected GI bleed but occult blood returned negative, for dropping h/h pt received 2 u prbc on admission with appropriate response to therapy. 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 In the interim while being dialyzed the patient had an episode of PAF, s/p cardizem became rate controlled and remained in NSR. EP consulted. Unable to AC the patient due to initial concern for GI bleeding, for which GI consulted and pt pending possible GI intervention when respiratory status improves and when cleared from the cardiac standpoint. Clinical concern for ARF causing volume overload, unclear if HFPEF is a contributing factor. Now presenting right arm ecchymosis on the cath side.     S/p R thoracocentesis on 3/20 with 1100 cc removed  S/P pleurocentesis done 3/21 on left sided with removal of 1 L. Fluid analysis consistent with transudate   s/p cath on 03/24/2018, NEG, EF 55%  Planning for endoscopy by GI, workup for anemia   and A/V fistula on 03/28/2018 as per vascular surgery

## 2018-03-25 NOTE — PROGRESS NOTE ADULT - PROBLEM SELECTOR PLAN 4
likely Anemia of chronic disease due to worsening acute on chronic renal insufficiency in the setting of CKD,  and concomitant  iron deficiency   -s/p 2 units pRBC on admission with improvement in h/h , another unit pRBC 3/23  -occult stool neg  -c/w ppi po bid   -GI consulted, deferred EGD until later this week. Cleared by Cardio   continue EPOGEN with HD and ferrous sulfate  -Will consider to transfuse since last H/H 8.3/25.7 and cardio recommends >9  Patient with new onset ecchymosis on right arm, on catheter side. Will call EP -No further episodes of AFib  -no AC at this time until GI work up completed  [facility] Cardiology following  Rates well controlled on b blocker  S/P cath, neg ( done because of chest Pain and Mild elevated troponin on admission )

## 2018-03-25 NOTE — PROGRESS NOTE ADULT - PROBLEM SELECTOR PLAN 5
-c/w clonidine and will up titrate as needed  -c/w hydralazine 25mgpo q 8hrs  - continue coreg and Lasix   HD also helping with HTN likely Anemia of chronic disease due to worsening acute on chronic renal insufficiency in the setting of CKD,  and concomitant  iron deficiency   -s/p 2 units pRBC on admission with improvement in h/h , another unit pRBC 3/23  -occult stool neg  -c/w ppi po bid   -GI consulted, deferred EGD until later this week. Cleared by Cardio   continue EPOGEN with HD and ferrous sulfate  Keep Hgb over 8

## 2018-03-25 NOTE — PROGRESS NOTE ADULT - SUBJECTIVE AND OBJECTIVE BOX
Events noted tolerating HD well      TELE: SR     MEDICATIONS  (STANDING):  artificial  tears Solution 1 Drop(s) Both EYES every 12 hours  aspirin enteric coated 81 milliGRAM(s) Oral daily  calcium acetate 667 milliGRAM(s) Oral three times a day with meals  carvedilol 12.5 milliGRAM(s) Oral every 12 hours  cloNIDine 0.1 milliGRAM(s) Oral two times a day  colchicine 0.6 milliGRAM(s) Oral two times a day  dextrose 5%. 1000 milliLiter(s) (50 mL/Hr) IV Continuous <Continuous>  dextrose 50% Injectable 12.5 Gram(s) IV Push once  dextrose 50% Injectable 25 Gram(s) IV Push once  dextrose 50% Injectable 25 Gram(s) IV Push once  docusate sodium 100 milliGRAM(s) Oral two times a day  epoetin gian Injectable 90780 Unit(s) IV Push <User Schedule>  ergocalciferol 11041 Unit(s) Oral every week  hydrALAZINE 25 milliGRAM(s) Oral every 8 hours  insulin lispro (HumaLOG) corrective regimen sliding scale   SubCutaneous three times a day before meals  insulin lispro (HumaLOG) corrective regimen sliding scale   SubCutaneous at bedtime  metoclopramide Injectable 10 milliGRAM(s) IV Push every 12 hours  pantoprazole    Tablet 40 milliGRAM(s) Oral two times a day  polyethylene glycol 3350 17 Gram(s) Oral daily  PPD  5 Tuberculin Unit(s) Injectable 5 Unit(s) IntraDermal once  senna 2 Tablet(s) Oral at bedtime  simvastatin 40 milliGRAM(s) Oral at bedtime    MEDICATIONS  (PRN):  acetaminophen   Tablet 650 milliGRAM(s) Oral every 6 hours PRN For Temp greater than 38 C (100.4 F)  acetaminophen   Tablet. 650 milliGRAM(s) Oral every 6 hours PRN Moderate Pain (4 - 6)  dextrose Gel 1 Dose(s) Oral once PRN Blood Glucose LESS THAN 70 milliGRAM(s)/deciliter  glucagon  Injectable 1 milliGRAM(s) IntraMuscular once PRN Glucose LESS THAN 70 milligrams/deciliter  hydrALAZINE Injectable 20 milliGRAM(s) IV Push every 6 hours PRN SBP > 160  ondansetron Injectable 4 milliGRAM(s) IV Push every 6 hours PRN Nausea      Allergies    No Known Allergies    Intolerances      PAST MEDICAL & SURGICAL HISTORY:  HLD (hyperlipidemia)  HTN (hypertension)  DM (diabetes mellitus)  No significant past surgical history      Vital Signs Last 24 Hrs  T(C): 37.1 (25 Mar 2018 05:18), Max: 37.2 (24 Mar 2018 21:06)  T(F): 98.8 (25 Mar 2018 05:18), Max: 98.9 (24 Mar 2018 21:06)  HR: 80 (25 Mar 2018 05:18) (67 - 80)  BP: 156/79 (25 Mar 2018 05:18) (156/79 - 193/97)  BP(mean): --  RR: 17 (25 Mar 2018 05:18) (17 - 18)  SpO2: 93% (25 Mar 2018 05:18) (93% - 98%)    Physical Exam:  Constitutional: NAD, AAOx3  Cardiovascular: +S1S2 RRR  Pulmonary: CTA b/l, unlabored  Abd: soft NTND +BS  Groins: C/D/I bilaterally; no bleeding, hematoma, edema  Extremities: no pedal edema, +distal pulses b/l  Neuro: non focal, KWONG x4    LABS:                        8.3    7.1   )-----------( 381      ( 25 Mar 2018 05:33 )             25.7     03-25    134<L>  |  96<L>  |  13.0  ----------------------------<  154<H>  4.0   |  26.0  |  2.58<H>    Ca    8.0<L>      25 Mar 2018 05:34 Events noted tolerating HD well      TELE: SR     MEDICATIONS  (STANDING):  artificial  tears Solution 1 Drop(s) Both EYES every 12 hours  aspirin enteric coated 81 milliGRAM(s) Oral daily  calcium acetate 667 milliGRAM(s) Oral three times a day with meals  carvedilol 12.5 milliGRAM(s) Oral every 12 hours  cloNIDine 0.1 milliGRAM(s) Oral two times a day  colchicine 0.6 milliGRAM(s) Oral two times a day  dextrose 5%. 1000 milliLiter(s) (50 mL/Hr) IV Continuous <Continuous>  dextrose 50% Injectable 12.5 Gram(s) IV Push once  dextrose 50% Injectable 25 Gram(s) IV Push once  dextrose 50% Injectable 25 Gram(s) IV Push once  docusate sodium 100 milliGRAM(s) Oral two times a day  epoetin gian Injectable 64805 Unit(s) IV Push <User Schedule>  ergocalciferol 93227 Unit(s) Oral every week  hydrALAZINE 25 milliGRAM(s) Oral every 8 hours  insulin lispro (HumaLOG) corrective regimen sliding scale   SubCutaneous three times a day before meals  insulin lispro (HumaLOG) corrective regimen sliding scale   SubCutaneous at bedtime  metoclopramide Injectable 10 milliGRAM(s) IV Push every 12 hours  pantoprazole    Tablet 40 milliGRAM(s) Oral two times a day  polyethylene glycol 3350 17 Gram(s) Oral daily  PPD  5 Tuberculin Unit(s) Injectable 5 Unit(s) IntraDermal once  senna 2 Tablet(s) Oral at bedtime  simvastatin 40 milliGRAM(s) Oral at bedtime    MEDICATIONS  (PRN):  acetaminophen   Tablet 650 milliGRAM(s) Oral every 6 hours PRN For Temp greater than 38 C (100.4 F)  acetaminophen   Tablet. 650 milliGRAM(s) Oral every 6 hours PRN Moderate Pain (4 - 6)  dextrose Gel 1 Dose(s) Oral once PRN Blood Glucose LESS THAN 70 milliGRAM(s)/deciliter  glucagon  Injectable 1 milliGRAM(s) IntraMuscular once PRN Glucose LESS THAN 70 milligrams/deciliter  hydrALAZINE Injectable 20 milliGRAM(s) IV Push every 6 hours PRN SBP > 160  ondansetron Injectable 4 milliGRAM(s) IV Push every 6 hours PRN Nausea      Allergies    No Known Allergies    Intolerances      PAST MEDICAL & SURGICAL HISTORY:  HLD (hyperlipidemia)  HTN (hypertension)  DM (diabetes mellitus)  No significant past surgical history      Vital Signs Last 24 Hrs  T(C): 37.1 (25 Mar 2018 05:18), Max: 37.2 (24 Mar 2018 21:06)  T(F): 98.8 (25 Mar 2018 05:18), Max: 98.9 (24 Mar 2018 21:06)  HR: 80 (25 Mar 2018 05:18) (67 - 80)  BP: 156/79 (25 Mar 2018 05:18) (156/79 - 193/97)  BP(mean): --  RR: 17 (25 Mar 2018 05:18) (17 - 18)  SpO2: 93% (25 Mar 2018 05:18) (93% - 98%)    Physical Exam:  Constitutional: NAD, AAOx3  Cardiovascular: +S1S2 RRR  Pulmonary: CTA b/l, unlabored  Abd: soft NTND +BS  Groins: C/D/I bilaterally; no bleeding, hematoma, edema  Extremities: no pedal edema, +distal pulses b/l  Neuro: non focal, KWONG x4    LABS:                        8.3    7.1   )-----------( 381      ( 25 Mar 2018 05:33 )             25.7     03-25    134<L>  |  96<L>  |  13.0  ----------------------------<  154<H>  4.0   |  26.0  |  2.58<H>    Ca    8.0<L>      25 Mar 2018 05:34      < from: TTE Echo Complete w/Doppler (03.14.18 @ 08:07) >    Summary:   1. Left ventricular ejection fraction, by visual estimation, is 60 to   65%.   2. Normal global left ventricular systolic function.   3. Spectral Doppler shows impaired relaxation pattern of left   ventricular myocardial filling (Grade I diastolic dysfunction).   4. Normal right ventricular size and function.   5. Moderate pericardial effusion.   6. Moderate pleural effusion in both left and right lateral regions.   7. Mild mitral valve regurgitation.   8. Sclerotic aortic valve with normal opening.   9. Questionable llambl's visualized on the aortic valve.  10. Mildly dilated pulmonary artery.    < end of copied text >    < from: Cardiac Cath Lab - Adult (03.23.18 @ 13:53) >  LM:   --  LM: Normal. The vessel was normal sized, not calcified, and not  tortuous. Angiography showed no evidence of disease.  LAD:   --  LAD: Normal. The vessel was normal sized, not calcified, and not  tortuous. Angiography showed minor luminal irregularities with no flow  limiting lesions.  CX:   --  Circumflex: Normal. The vessel was normal sized, not calcified,  and excessively tortuous.Angiography showed no evidence of disease.  RCA:   --  RCA: Normal. The vessel was normal sized, not calcified, and  moderately tortuous. Angiography showed no evidence of disease.  COMPLICATIONS: There were no complications. No complications occurred  during the cath lab visit.  DIAGNOSTIC IMPRESSIONS: There is mild irregularity of the coronary anatomy.  Left ventricular function is normal (LVEF=55%).    < end of copied text >

## 2018-03-25 NOTE — PROGRESS NOTE ADULT - PROBLEM SELECTOR PLAN 1
secondary to Uremic Pericarditis now improved   clinically improved after regular HD, received yesterday (3/24) and dthe day before  d/jonatan furosemide 20mg po daily;    TTE with preserved EF,   -c/w supplemental o2 as needed;    -c/w colchicine 0.6mg po bid for presumed Uremic pericarditis. Per cardiology, Needs total 4 weeks and then stop  -Hemodialysis as per nephrology recommendations.  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 Per cardiology, Needs total 4 weeks and then stop  -Hemodialysis as per nephrology recommendations.  Fluid analysis consistent with transudate from effusions

## 2018-03-25 NOTE — PROGRESS NOTE ADULT - PROBLEM SELECTOR PLAN 2
baseline CKD 3 now progressed to ESRD requiring HD: with uremic pericarditis requiring dialysis  -Acute hepatitis panel negative,   -ppd at 72 hours 0mm negative  -Permacath on right side, neck. Placed  03/22/2018 per [facility] surgery.  -HD per renal  -planning on AVF/AVG, tentative on 03/28/2018 by vascular  -EPO weekly added on 03/22/18 per nephro

## 2018-03-25 NOTE — PROGRESS NOTE ADULT - ASSESSMENT
58 yo [place] speaking female with hx of hypertension, hyperlipidemia, type 2 diabetes, with 1 month hx of vomiting and abdominal pain, with NSAID use.  Developed pleuritic chest pain, worsened in supine position and with deep breaths.  Chief complaint of N/V and dark stools noted to be in acute pulm edema/pericardial effusion with ARF, underwent US guided Right thoracentesis, mild discomfort noted, seen by Team and ordered Tylenol.  Also complained of no BM x 3 days, added sorbitol and informed PCP to follow.   Also cardiac cath cancelled yesterday due to respiratory distress.  NO EDG until cleared, GI following.        1.. Anemia - may be due to chronic disease,  GI workup pending.  CKD  Keep Hb > 9.   2. Hypertension- well controlled, continue carvedilol, hydralazine, clonidine, lasix.   3. MARSHALL-on CKD -   HD per renal (-) 1.5 L yesterday  4. PAF - newly diagnosed; no AC for now given anemia, will need GI clearance; appreciate EP input regarding KELLI closure candidacy. Will perform GERALD prior to discharge to assess KELLI anatomy and if suitable for KELLI closure.  DVT prophylaxis 58 yo [place] speaking female with hx of hypertension, hyperlipidemia, type 2 diabetes, with 1 month hx of vomiting and abdominal pain, with NSAID MARSHALL on CKD , cardiac cath mild CAD no intervention, LVEF 55% on TTE currently in NSR. tolerating HD well. remains in SR.       1.. Anemia - may be due to chronic disease,  GI workup pending.  CKD  Keep Hb > 9.   2. Hypertension- well controlled, continue carvedilol, hydralazine, clonidine, lasix.   3. MARSHALL-on CKD -   HD per renal (-) 1.5 L yesterday    Based on RCRI risk index patient is at low-intermediate riskof MACE for EGD +/- colonoscopy . Patient currently optimized from cardiac standpoint. Benefits outweigh risks.   4. PAF - newly diagnosed; no AC for now given anemia, will need GI clearance; appreciate EP input regarding KELLI closure candidacy. Will perform GERALD prior to discharge to assess KELLI anatomy and if suitable for KELLI closure.  DVT prophylaxis

## 2018-03-25 NOTE — PROGRESS NOTE ADULT - SUBJECTIVE AND OBJECTIVE BOX
Patient is a 57y old  Female who presents with a chief complaint of Nausea/vomiting and Chest pain (16 Mar 2018 17:20)    INTERVAL HPI/OVERNIGHT EVENTS:  Patient is a 58 y/o Female seen and examined at bedside. Patient noted to have right arm painful bruising, ipsilateral to catheterism from yesterdar whic as per nurse has been increasing in size. Patient has been doing well, slept well, no diarrhea or events. Patient denying CP, SOB, palpitations, leg swelling, lightheadedness/dizziness, paresthesias. Otherwise ROS unremarkable.  LBM: 2018    On monitor: NSR, no events    Vital Signs Last 24 Hrs  T(C): 37.1 (25 Mar 2018 05:18), Max: 37.2 (24 Mar 2018 21:06)  T(F): 98.8 (25 Mar 2018 05:18), Max: 98.9 (24 Mar 2018 21:06)  HR: 80 (25 Mar 2018 05:18) (67 - 80)  BP: 156/79 (25 Mar 2018 05:18) (156/79 - 193/97)  RR: 17 (25 Mar 2018 05:18) (17 - 18)  SpO2: 93% (25 Mar 2018 05:18) (93% - 98%)      Daily     Daily Weight in k (24 Mar 2018 19:33) net: +1kg  Daily Weight in k (23 Mar 2018 20:12)      I&O's Detail    24 Mar 2018 07:01  -  25 Mar 2018 07:00  --------------------------------------------------------  IN:    Oral Fluid: 360 mL  Total IN: 360 mL    OUT:    Other: 1500 mL  Total OUT: 1500 mL    Total NET: -1140 mL      PHYSICAL EXAM:    GENERAL: NAD, well-groomed, well-developed  HEENT: eomi, perrla, MMM  CHEST/LUNG: fair b/l ae. b/l base crackles noted /  HEART: S1S2nl, no r/g noted now. + 3/6 SM. No / pericardial rub noted   ABDOMEN: Soft, Nontender, Nondistended; Bowel sounds present  EXTREMITIES:  2+ Peripheral Pulses, No clubbing, cyanosis, or edema  Skin: right sided PC in place (neck)  NERVOUS SYSTEM:  Alert & Oriented X3, Good concentration; Motor Strength 5/5 B/L upper and lower extremities; DTRs 2+ intact and symmetric    LABS:                          8.3    7.1   )-----------( 381      ( 25 Mar 2018 05:33 )             25.7                03-25    134<L>  |  96<L>  |  13.0  ----------------------------<  154<H>  4.0   |  26.0  |  2.58<H>    Ca    8.0<L>      25 Mar 2018 05:34        Phos  2.3       Mg     1.8         Hemoglobin A1C, Whole Blood: 7.5 % ( @ 11:12)    Culture Results:   No growth at 2 days.  Culture in progress ( @ 11:30)  Culture Results:   No growth at 3 days.  Culture in progress ( @ 12:48)  Culture Results:   <10,000 CFU/ml Gram Positive Cocci in Clusters  <10,000 CFU/ml Gram Positive Rods ( @ 21:37)  Culture Results:   No growth at 5 days. ( @ 22:13)  Culture Results:   No growth at 5 days. ( @ 22:12)    RADIOLOGY & ADDITIONAL TESTS:    < from: Xray Chest 1 View AP/PA. (18 @ 11:51) >  IMPRESSION:    Support Devices: None  Heart: Cardiomegaly  Mediastinum:  Unremarkable  Lungs/Airways: Decreased left pleural effusion. No significant   pneumothorax. Multiple moderate right pleural effusion.  Bones/Soft tissues: Unremarkable        [facility]  Department of Cardiology  [address] [place] [id number]  [phone]  Cath Lab Report -- Comprehensive Report  Patient: JAROCHO MORALES  Study date: 2018  Account number: [id number]  MR number: [id number]  : 1960  Gender: Female  Race: O  Case Physician(s):  Jesús Godinez MD  Referring Physician:  INDICATIONS: Acute diastolic congestive heart failure.  HISTORY: 56 yo woman with ESKD now on dialysis, admitted with worsening  SOB, orthopnea and anemia. She had bilateral pleural effusion and moderate  pericardial effusion. There was no prior cardiac history. The patient has  hypertension and dialysis-treated renal failure.  PROCEDURE:  --  Left heart catheterization with ventriculography.  --  Left coronary angiography.  --  Right coronary angiography.  TECHNIQUE: The risks and alternatives of the procedures and conscious  sedation were explained to the patient and informed consent was obtained.  Cardiac catheterization performed electively.  Local anesthetic given. Right radial artery access. Left heart  catheterization. Ventriculography was performed. Left coronary artery  angiography. The vessel was injected utilizing a catheter. Right coronary  artery angiography. The vessel was injected utilizing a catheter.  RADIATION EXPOSURE: 3 min.  CONTRAST GIVEN: Omnipaque 60 ml.  MEDICATIONS GIVEN: Verapamil (Isoptin, Calan, Covera), 5 mg, IA. Heparin,  3000 units, IA. 1% Lidocaine, 10 ml, subcutaneously.  VENTRICLES: There were no left ventricular global or regional wall motion  abnormalities. Global left ventricular function was normal. EF calculated  by contrast ventriculography was 55 %.  VALVES: AORTIC VALVE: No significant aortic valve gradient. MITRAL VALVE:  The mitral valve exhibited trivial regurgitation (less than 1+).  CORONARY VESSELS: The coronary circulation is right dominant.  LM:   --  LM: Normal. The vessel was normal sized, not calcified, and not  tortuous. Angiography showed no evidence of disease.  LAD:   --  LAD: Normal. The vessel was normal sized, not calcified, and not  tortuous. Angiography showed minor luminal irregularities with no flow  limiting lesions.  CX:   --  Circumflex: Normal. The vessel was normal sized, not calcified,  and excessively tortuous.Angiography showed no evidence of disease.  RCA:   --  RCA: Normal. The vessel was normal sized, not calcified, and  moderately tortuous. Angiography showed no evidence of disease.  COMPLICATIONS: There were no complications. No complications occurred  during the cath lab visit.  DIAGNOSTIC IMPRESSIONS: There is mild irregularity of the coronary anatomy.  Left ventricular function is normal (LVEF=55%).  DIAGNOSTIC RECOMMENDATIONS: The patient should continue with the present  medications. Patientmanagement should include aggressive medical therapy  and resumption of all previous activities in 2 days.  Prepared and signed by  Jesús Godinez MD  Signed 2018 14:30:17  HEMODYNAMIC TABLES  Pressures:  Baseline  Pressures:  - HR: 68  Pressures:  - Rhythm:  Pressures:  -- Aortic Pressure (S/D/M): 164/83/117  Pressures:  -- Left Ventricle (s/edp): 166/24/--  Outputs:  Baseline  Outputs:  -- CALCULATIONS[age]  Outputs:  -- CALCULATIONS: Body Surface Area: 1.68  Outputs:  -- CALCULATIONS: Height in cm: 157.00  Outputs:  -- CALCULATIONS: Sex: Female  Outputs:  -- CALCULATIONS: Weight in k.60          MEDICATIONS  (STANDING):  artificial  tears Solution 1 Drop(s) Both EYES every 12 hours  aspirin enteric coated 81 milliGRAM(s) Oral daily  calcium acetate 667 milliGRAM(s) Oral three times a day with meals  carvedilol 12.5 milliGRAM(s) Oral every 12 hours  cloNIDine 0.1 milliGRAM(s) Oral two times a day  colchicine 0.6 milliGRAM(s) Oral two times a day  dextrose 5%. 1000 milliLiter(s) (50 mL/Hr) IV Continuous <Continuous>  dextrose 50% Injectable 12.5 Gram(s) IV Push once  dextrose 50% Injectable 25 Gram(s) IV Push once  dextrose 50% Injectable 25 Gram(s) IV Push once  docusate sodium 100 milliGRAM(s) Oral two times a day  epoetin gian Injectable 44778 Unit(s) IV Push <User Schedule>  ergocalciferol 30841 Unit(s) Oral every week  hydrALAZINE 25 milliGRAM(s) Oral every 8 hours  insulin lispro (HumaLOG) corrective regimen sliding scale   SubCutaneous three times a day before meals  insulin lispro (HumaLOG) corrective regimen sliding scale   SubCutaneous at bedtime  metoclopramide Injectable 10 milliGRAM(s) IV Push every 12 hours  pantoprazole    Tablet 40 milliGRAM(s) Oral two times a day  polyethylene glycol 3350 17 Gram(s) Oral daily  PPD  5 Tuberculin Unit(s) Injectable 5 Unit(s) IntraDermal once  senna 2 Tablet(s) Oral at bedtime  simvastatin 40 milliGRAM(s) Oral at bedtime    MEDICATIONS  (PRN):  acetaminophen   Tablet 650 milliGRAM(s) Oral every 6 hours PRN For Temp greater than 38 C (100.4 F)  acetaminophen   Tablet. 650 milliGRAM(s) Oral every 6 hours PRN Moderate Pain (4 - 6)  dextrose Gel 1 Dose(s) Oral once PRN Blood Glucose LESS THAN 70 milliGRAM(s)/deciliter  glucagon  Injectable 1 milliGRAM(s) IntraMuscular once PRN Glucose LESS THAN 70 milligrams/deciliter  hydrALAZINE Injectable 20 milliGRAM(s) IV Push every 6 hours PRN SBP > 160  ondansetron Injectable 4 milliGRAM(s) IV Push every 6 hours PRN Nausea Patient is a 57y old  Female who presents with a chief complaint of Nausea/vomiting and Chest pain (16 Mar 2018 17:20)    INTERVAL HPI/OVERNIGHT EVENTS:  Patient is a 56 y/o Female seen and examined at bedside. Patient noted to have right arm painful bruising, ipsilateral to catheterization from friday whic as per nurse has been increasing in size. Patient has been doing well, slept well, no diarrhea or events. Patient denying CP, SOB, palpitations, leg swelling, lightheadedness/dizziness, paresthesias. Otherwise ROS unremarkable.  LBM: 2018    On monitor: NSR, no events    Vital Signs Last 24 Hrs  T(C): 37.1 (25 Mar 2018 05:18), Max: 37.2 (24 Mar 2018 21:06)  T(F): 98.8 (25 Mar 2018 05:18), Max: 98.9 (24 Mar 2018 21:06)  HR: 80 (25 Mar 2018 05:18) (67 - 80)  BP: 156/79 (25 Mar 2018 05:18) (156/79 - 193/97)  RR: 17 (25 Mar 2018 05:18) (17 - 18)  SpO2: 93% (25 Mar 2018 05:18) (93% - 98%)    PHYSICAL EXAM:    GENERAL: NAD, well-groomed, well-developed  HEENT: eomi, perrla, MMM  CHEST/LUNG: fair b/l ae. b/l base crackles noted   HEART: S1S2nl, no r/g noted now. + 3/6 SM. No pericardial rub noted   ABDOMEN: Soft, Nontender, Nondistended; Bowel sounds present  EXTREMITIES:  2+ Peripheral Pulses, No clubbing, cyanosis, or edema in b/l LE. RUE with swelling to fingers with medial upper arm ecchymosis. Pulses in right arm palpable with normal temp  Skin: right sided PC in place (neck)  NERVOUS SYSTEM:  Alert & Oriented X3, Good concentration; Motor Strength 5/5 B/L upper and lower extremities; DTRs 2+ intact and symmetric    LABS:                          8.3    7.1   )-----------( 381      ( 25 Mar 2018 05:33 )             25.7                03-25    134<L>  |  96<L>  |  13.0  ----------------------------<  154<H>  4.0   |  26.0  |  2.58<H>    Ca    8.0<L>      25 Mar 2018 05:34        Phos  2.3       Mg     1.8         Hemoglobin A1C, Whole Blood: 7.5 % ( @ 11:12)    Culture Results:   No growth at 2 days.  Culture in progress ( @ 11:30)  Culture Results:   No growth at 3 days.  Culture in progress ( @ 12:48)  Culture Results:   <10,000 CFU/ml Gram Positive Cocci in Clusters  <10,000 CFU/ml Gram Positive Rods ( @ 21:37)  Culture Results:   No growth at 5 days. ( @ 22:13)  Culture Results:   No growth at 5 days. ( @ 22:12)    RADIOLOGY & ADDITIONAL TESTS:    < from: Xray Chest 1 View AP/PA. (18 @ 11:51) >  IMPRESSION:    Support Devices: None  Heart: Cardiomegaly  Mediastinum:  Unremarkable  Lungs/Airways: Decreased left pleural effusion. No significant   pneumothorax. Multiple moderate right pleural effusion.  Bones/Soft tissues: Unremarkable        [facility]  Department of Cardiology  [address] [place] [id number]  [phone]  Cath Lab Report -- Comprehensive Report  Patient: JAROCHO MORALES  Study date: 2018  Account number: [id number]  MR number: [id number]  : 1960  Gender: Female  Race: O  Case Physician(s):  Jesús Godinez MD  Referring Physician:  INDICATIONS: Acute diastolic congestive heart failure.  HISTORY: 58 yo woman with ESKD now on dialysis, admitted with worsening  SOB, orthopnea and anemia. She had bilateral pleural effusion and moderate  pericardial effusion. There was no prior cardiac history. The patient has  hypertension and dialysis-treated renal failure.  PROCEDURE:  --  Left heart catheterization with ventriculography.  --  Left coronary angiography.  --  Right coronary angiography.  TECHNIQUE: The risks and alternatives of the procedures and conscious  sedation were explained to the patient and informed consent was obtained.  Cardiac catheterization performed electively.  Local anesthetic given. Right radial artery access. Left heart  catheterization. Ventriculography was performed. Left coronary artery  angiography. The vessel was injected utilizing a catheter. Right coronary  artery angiography. The vessel was injected utilizing a catheter.  RADIATION EXPOSURE: 3 min.  CONTRAST GIVEN: Omnipaque 60 ml.  MEDICATIONS GIVEN: Verapamil (Isoptin, Calan, Covera), 5 mg, IA. Heparin,  3000 units, IA. 1% Lidocaine, 10 ml, subcutaneously.  VENTRICLES: There were no left ventricular global or regional wall motion  abnormalities. Global left ventricular function was normal. EF calculated  by contrast ventriculography was 55 %.  VALVES: AORTIC VALVE: No significant aortic valve gradient. MITRAL VALVE:  The mitral valve exhibited trivial regurgitation (less than 1+).  CORONARY VESSELS: The coronary circulation is right dominant.  LM:   --  LM: Normal. The vessel was normal sized, not calcified, and not  tortuous. Angiography showed no evidence of disease.  LAD:   --  LAD: Normal. The vessel was normal sized, not calcified, and not  tortuous. Angiography showed minor luminal irregularities with no flow  limiting lesions.  CX:   --  Circumflex: Normal. The vessel was normal sized, not calcified,  and excessively tortuous.Angiography showed no evidence of disease.  RCA:   --  RCA: Normal. The vessel was normal sized, not calcified, and  moderately tortuous. Angiography showed no evidence of disease.  COMPLICATIONS: There were no complications. No complications occurred  during the cath lab visit.  DIAGNOSTIC IMPRESSIONS: There is mild irregularity of the coronary anatomy.  Left ventricular function is normal (LVEF=55%).  DIAGNOSTIC RECOMMENDATIONS: The patient should continue with the present  medications. Patientmanagement should include aggressive medical therapy  and resumption of all previous activities in 2 days.  Prepared and signed by  Jesús Godinez MD  Signed 2018 14:30:17  HEMODYNAMIC TABLES  Pressures:  Baseline  Pressures:  - HR: 68  Pressures:  - Rhythm:  Pressures:  -- Aortic Pressure (S/D/M): 164/83/117  Pressures:  -- Left Ventricle (s/edp): 166/24/--  Outputs:  Baseline  Outputs:  -- CALCULATIONS[age]  Outputs:  -- CALCULATIONS: Body Surface Area: 1.68  Outputs:  -- CALCULATIONS: Height in cm: 157.00  Outputs:  -- CALCULATIONS: Sex: Female  Outputs:  -- CALCULATIONS: Weight in k.60          MEDICATIONS  (STANDING):  artificial  tears Solution 1 Drop(s) Both EYES every 12 hours  aspirin enteric coated 81 milliGRAM(s) Oral daily  calcium acetate 667 milliGRAM(s) Oral three times a day with meals  carvedilol 12.5 milliGRAM(s) Oral every 12 hours  cloNIDine 0.1 milliGRAM(s) Oral two times a day  colchicine 0.6 milliGRAM(s) Oral two times a day  dextrose 5%. 1000 milliLiter(s) (50 mL/Hr) IV Continuous <Continuous>  dextrose 50% Injectable 12.5 Gram(s) IV Push once  dextrose 50% Injectable 25 Gram(s) IV Push once  dextrose 50% Injectable 25 Gram(s) IV Push once  docusate sodium 100 milliGRAM(s) Oral two times a day  epoetin gian Injectable 21706 Unit(s) IV Push <User Schedule>  ergocalciferol 30478 Unit(s) Oral every week  hydrALAZINE 25 milliGRAM(s) Oral every 8 hours  insulin lispro (HumaLOG) corrective regimen sliding scale   SubCutaneous three times a day before meals  insulin lispro (HumaLOG) corrective regimen sliding scale   SubCutaneous at bedtime  metoclopramide Injectable 10 milliGRAM(s) IV Push every 12 hours  pantoprazole    Tablet 40 milliGRAM(s) Oral two times a day  polyethylene glycol 3350 17 Gram(s) Oral daily  PPD  5 Tuberculin Unit(s) Injectable 5 Unit(s) IntraDermal once  senna 2 Tablet(s) Oral at bedtime  simvastatin 40 milliGRAM(s) Oral at bedtime    MEDICATIONS  (PRN):  acetaminophen   Tablet 650 milliGRAM(s) Oral every 6 hours PRN For Temp greater than 38 C (100.4 F)  acetaminophen   Tablet. 650 milliGRAM(s) Oral every 6 hours PRN Moderate Pain (4 - 6)  dextrose Gel 1 Dose(s) Oral once PRN Blood Glucose LESS THAN 70 milliGRAM(s)/deciliter  glucagon  Injectable 1 milliGRAM(s) IntraMuscular once PRN Glucose LESS THAN 70 milligrams/deciliter  hydrALAZINE Injectable 20 milliGRAM(s) IV Push every 6 hours PRN SBP > 160  ondansetron Injectable 4 milliGRAM(s) IV Push every 6 hours PRN Nausea

## 2018-03-25 NOTE — PROGRESS NOTE ADULT - SUBJECTIVE AND OBJECTIVE BOX
NEPHROLOGY INTERVAL HPI/OVERNIGHT EVENTS:    No new events.    MEDICATIONS  (STANDING):  artificial  tears Solution 1 Drop(s) Both EYES every 12 hours  aspirin enteric coated 81 milliGRAM(s) Oral daily  calcium acetate 667 milliGRAM(s) Oral three times a day with meals  carvedilol 12.5 milliGRAM(s) Oral every 12 hours  cloNIDine 0.1 milliGRAM(s) Oral two times a day  colchicine 0.6 milliGRAM(s) Oral two times a day  dextrose 5%. 1000 milliLiter(s) (50 mL/Hr) IV Continuous <Continuous>  dextrose 50% Injectable 12.5 Gram(s) IV Push once  dextrose 50% Injectable 25 Gram(s) IV Push once  dextrose 50% Injectable 25 Gram(s) IV Push once  docusate sodium 100 milliGRAM(s) Oral two times a day  epoetin gian Injectable 42343 Unit(s) IV Push <User Schedule>  ergocalciferol 83484 Unit(s) Oral every week  furosemide    Tablet 20 milliGRAM(s) Oral daily  hydrALAZINE 25 milliGRAM(s) Oral every 8 hours  insulin lispro (HumaLOG) corrective regimen sliding scale   SubCutaneous three times a day before meals  insulin lispro (HumaLOG) corrective regimen sliding scale   SubCutaneous at bedtime  metoclopramide Injectable 10 milliGRAM(s) IV Push every 12 hours  pantoprazole    Tablet 40 milliGRAM(s) Oral two times a day  polyethylene glycol 3350 17 Gram(s) Oral daily  PPD  5 Tuberculin Unit(s) Injectable 5 Unit(s) IntraDermal once  senna 2 Tablet(s) Oral at bedtime  simvastatin 40 milliGRAM(s) Oral at bedtime    MEDICATIONS  (PRN):  acetaminophen   Tablet 650 milliGRAM(s) Oral every 6 hours PRN For Temp greater than 38 C (100.4 F)  acetaminophen   Tablet. 650 milliGRAM(s) Oral every 6 hours PRN Moderate Pain (4 - 6)  dextrose Gel 1 Dose(s) Oral once PRN Blood Glucose LESS THAN 70 milliGRAM(s)/deciliter  glucagon  Injectable 1 milliGRAM(s) IntraMuscular once PRN Glucose LESS THAN 70 milligrams/deciliter  hydrALAZINE Injectable 20 milliGRAM(s) IV Push every 6 hours PRN SBP > 160  ondansetron Injectable 4 milliGRAM(s) IV Push every 6 hours PRN Nausea      Allergies    No Known Allergies        Vital Signs Last 24 Hrs    T(C): 37 (25 Mar 2018 10:00), Max: 37.2 (24 Mar 2018 21:06)  T(F): 98.6 (25 Mar 2018 10:00), Max: 98.9 (24 Mar 2018 21:06)  HR: 78 (25 Mar 2018 10:00) (73 - 80)  BP: 138/70 (25 Mar 2018 10:00) (138/70 - 183/92)  BP(mean): --  RR: 16 (25 Mar 2018 10:00) (16 - 18)  SpO2: 95% (25 Mar 2018 10:00) (93% - 96%)    T(C): 37.3 (24 Mar 2018 06:00), Max: 37.3 (24 Mar 2018 06:00)  T(F): 99.1 (24 Mar 2018 06:00), Max: 99.1 (24 Mar 2018 06:00)  HR: 78 (24 Mar 2018 06:00) (64 - 78)  BP: 162/84 (24 Mar 2018 06:00) (134/68 - 194/99)  BP(mean): --  RR: 18 (24 Mar 2018 06:00) (13 - 23)  SpO2: 97% (24 Mar 2018 06:00) (95% - 99%)  T(C): 36.7 (22 Mar 2018 05:20), Max: 36.8 (21 Mar 2018 21:01)  T(F): 98 (22 Mar 2018 05:20), Max: 98.2 (21 Mar 2018 21:01)  HR: 79 (22 Mar 2018 05:20) (66 - 79)  BP: 194/94 (22 Mar 2018 05:20) (141/72 - 194/94)  BP(mean): --  RR: 16 (22 Mar 2018 05:20) (16 - 18)  SpO2: 99% (22 Mar 2018 05:20) (92% - 100%)    PHYSICAL EXAM:  GENERAL: Feels stronger  HEAD:  Periorbital edema  EYES: Same  NECK: Supple, No JVD, right permacath  site  clean  NERVOUS SYSTEM:  Alert & Oriented X3,  intact and symmetric  CHEST/LUNG: Diminished BS bilaterally (L > R)   HEART: Regular rate and rhythm; No rub, murmur same  ABDOMEN: Soft, +BS  EXTREMITIES:  trace edema  SKIN: No rashes or lesions      LABS:     03-25    134<L>  |  96<L>  |  13.0  ----------------------------<  154<H>  4.0   |  26.0  |  2.58<H>    Ca    8.0<L>      25 Mar 2018 05:34        03-24    130<L>  |  93<L>  |  25.0<H>  ----------------------------<  175<H>  3.9   |  25.0  |  3.38<H>    Ca    8.0<L>      24 Mar 2018 07:08  Phos  2.3     03-23  Mg     1.8     03-23                            8.9    8.1   )-----------( 514      ( 22 Mar 2018 06:35 )             26.9     03-22    127<L>  |  89<L>  |  37.0<H>  ----------------------------<  154<H>  4.0   |  22.0  |  5.21<H>    Ca    8.0<L>      22 Mar 2018 06:35    TPro  6.0<L>  /  Alb  2.1<L>  /  TBili  0.3<L>  /  DBili  x   /  AST  62<H>  /  ALT  39<H>  /  AlkPhos  121<H>  03-20            RADIOLOGY & ADDITIONAL TESTS:

## 2018-03-25 NOTE — PROGRESS NOTE ADULT - ATTENDING COMMENTS
Note addended where needed. Plan discussed with patient at bedside with Hungarian speaking resident with daughter at bedside.

## 2018-03-26 DIAGNOSIS — R19.5 OTHER FECAL ABNORMALITIES: ICD-10-CM

## 2018-03-26 DIAGNOSIS — R11.14 BILIOUS VOMITING: ICD-10-CM

## 2018-03-26 LAB
ALBUMIN SERPL ELPH-MCNC: 2.4 G/DL — LOW (ref 3.3–5.2)
ALP SERPL-CCNC: 176 U/L — HIGH (ref 40–120)
ALT FLD-CCNC: 29 U/L — SIGNIFICANT CHANGE UP
ANION GAP SERPL CALC-SCNC: 14 MMOL/L — SIGNIFICANT CHANGE UP (ref 5–17)
AST SERPL-CCNC: 42 U/L — HIGH
BILIRUB SERPL-MCNC: 0.4 MG/DL — SIGNIFICANT CHANGE UP (ref 0.4–2)
BLD GP AB SCN SERPL QL: SIGNIFICANT CHANGE UP
BUN SERPL-MCNC: 21 MG/DL — HIGH (ref 8–20)
CALCIUM SERPL-MCNC: 8.4 MG/DL — LOW (ref 8.6–10.2)
CHLORIDE SERPL-SCNC: 91 MMOL/L — LOW (ref 98–107)
CO2 SERPL-SCNC: 25 MMOL/L — SIGNIFICANT CHANGE UP (ref 22–29)
CREAT SERPL-MCNC: 4.04 MG/DL — HIGH (ref 0.5–1.3)
CULTURE RESULTS: SIGNIFICANT CHANGE UP
GLUCOSE BLDC GLUCOMTR-MCNC: 130 MG/DL — HIGH (ref 70–99)
GLUCOSE BLDC GLUCOMTR-MCNC: 162 MG/DL — HIGH (ref 70–99)
GLUCOSE BLDC GLUCOMTR-MCNC: 210 MG/DL — HIGH (ref 70–99)
GLUCOSE BLDC GLUCOMTR-MCNC: 221 MG/DL — HIGH (ref 70–99)
GLUCOSE SERPL-MCNC: 201 MG/DL — HIGH (ref 70–115)
HCT VFR BLD CALC: 26.8 % — LOW (ref 37–47)
HGB BLD-MCNC: 8.4 G/DL — LOW (ref 12–16)
MAGNESIUM SERPL-MCNC: 1.6 MG/DL — SIGNIFICANT CHANGE UP (ref 1.6–2.6)
MCHC RBC-ENTMCNC: 27.6 PG — SIGNIFICANT CHANGE UP (ref 27–31)
MCHC RBC-ENTMCNC: 31.3 G/DL — LOW (ref 32–36)
MCV RBC AUTO: 88.2 FL — SIGNIFICANT CHANGE UP (ref 81–99)
PHOSPHATE SERPL-MCNC: 1.6 MG/DL — LOW (ref 2.4–4.7)
PLATELET # BLD AUTO: 392 K/UL — SIGNIFICANT CHANGE UP (ref 150–400)
POTASSIUM SERPL-MCNC: 4.2 MMOL/L — SIGNIFICANT CHANGE UP (ref 3.5–5.3)
POTASSIUM SERPL-SCNC: 4.2 MMOL/L — SIGNIFICANT CHANGE UP (ref 3.5–5.3)
PROT SERPL-MCNC: 6.1 G/DL — LOW (ref 6.6–8.7)
RBC # BLD: 3.04 M/UL — LOW (ref 4.4–5.2)
RBC # FLD: 14.1 % — SIGNIFICANT CHANGE UP (ref 11–15.6)
SODIUM SERPL-SCNC: 130 MMOL/L — LOW (ref 135–145)
SPECIMEN SOURCE: SIGNIFICANT CHANGE UP
TYPE + AB SCN PNL BLD: SIGNIFICANT CHANGE UP
WBC # BLD: 7.8 K/UL — SIGNIFICANT CHANGE UP (ref 4.8–10.8)
WBC # FLD AUTO: 7.8 K/UL — SIGNIFICANT CHANGE UP (ref 4.8–10.8)

## 2018-03-26 PROCEDURE — 99232 SBSQ HOSP IP/OBS MODERATE 35: CPT

## 2018-03-26 PROCEDURE — 99233 SBSQ HOSP IP/OBS HIGH 50: CPT | Mod: GC

## 2018-03-26 RX ORDER — HYDRALAZINE HCL 50 MG
50 TABLET ORAL EVERY 8 HOURS
Qty: 0 | Refills: 0 | Status: DISCONTINUED | OUTPATIENT
Start: 2018-03-26 | End: 2018-03-28

## 2018-03-26 RX ORDER — SENNA PLUS 8.6 MG/1
2 TABLET ORAL AT BEDTIME
Qty: 0 | Refills: 0 | Status: DISCONTINUED | OUTPATIENT
Start: 2018-03-26 | End: 2018-03-26

## 2018-03-26 RX ORDER — ALTEPLASE 100 MG
2 KIT INTRAVENOUS ONCE
Qty: 0 | Refills: 0 | Status: COMPLETED | OUTPATIENT
Start: 2018-03-26 | End: 2018-03-26

## 2018-03-26 RX ORDER — POLYETHYLENE GLYCOL 3350 17 G/17G
17 POWDER, FOR SOLUTION ORAL DAILY
Qty: 0 | Refills: 0 | Status: DISCONTINUED | OUTPATIENT
Start: 2018-03-26 | End: 2018-03-26

## 2018-03-26 RX ORDER — DOCUSATE SODIUM 100 MG
100 CAPSULE ORAL
Qty: 0 | Refills: 0 | Status: DISCONTINUED | OUTPATIENT
Start: 2018-03-26 | End: 2018-03-26

## 2018-03-26 RX ADMIN — PANTOPRAZOLE SODIUM 40 MILLIGRAM(S): 20 TABLET, DELAYED RELEASE ORAL at 06:17

## 2018-03-26 RX ADMIN — Medication 10 MILLIGRAM(S): at 06:16

## 2018-03-26 RX ADMIN — Medication 50 MILLIGRAM(S): at 22:51

## 2018-03-26 RX ADMIN — ALTEPLASE 2 MILLIGRAM(S): KIT at 18:13

## 2018-03-26 RX ADMIN — Medication 81 MILLIGRAM(S): at 12:43

## 2018-03-26 RX ADMIN — Medication 0.1 MILLIGRAM(S): at 19:00

## 2018-03-26 RX ADMIN — Medication 0.6 MILLIGRAM(S): at 06:18

## 2018-03-26 RX ADMIN — TUBERCULIN PURIFIED PROTEIN DERIVATIVE 5 UNIT(S): 5 INJECTION, SOLUTION INTRADERMAL at 12:38

## 2018-03-26 RX ADMIN — SIMVASTATIN 40 MILLIGRAM(S): 20 TABLET, FILM COATED ORAL at 22:51

## 2018-03-26 RX ADMIN — CARVEDILOL PHOSPHATE 12.5 MILLIGRAM(S): 80 CAPSULE, EXTENDED RELEASE ORAL at 19:00

## 2018-03-26 RX ADMIN — Medication 0.1 MILLIGRAM(S): at 06:18

## 2018-03-26 RX ADMIN — Medication 50 MILLIGRAM(S): at 13:43

## 2018-03-26 RX ADMIN — Medication 667 MILLIGRAM(S): at 12:43

## 2018-03-26 RX ADMIN — Medication 1 DROP(S): at 06:19

## 2018-03-26 RX ADMIN — Medication 2: at 08:14

## 2018-03-26 RX ADMIN — Medication 667 MILLIGRAM(S): at 08:14

## 2018-03-26 RX ADMIN — Medication 25 MILLIGRAM(S): at 06:18

## 2018-03-26 RX ADMIN — CARVEDILOL PHOSPHATE 12.5 MILLIGRAM(S): 80 CAPSULE, EXTENDED RELEASE ORAL at 06:17

## 2018-03-26 RX ADMIN — Medication 2: at 12:43

## 2018-03-26 NOTE — CHART NOTE - NSCHARTNOTEFT_GEN_A_CORE
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    Current Diet: Diet, Consistent Carbohydrate Renal/No Snacks:   Fiber/Residue Restricted  DASH/TLC {Sodium & Cholesteral Restricted}  Low Fat (LOWFAT)  1200mL Fluid Restriction ([id number])  Supplement Feeding Modality:  Oral  Nepro Cans or Servings Per Day:  1       Frequency:  Daily (03-26-18 @ 10:14)    PO intake:  Pt with fair to good po intake at meals.    Current Weight:   (3/24) 170#  (3/13) 150#    % Weight Change - possible wt fluctuations due to HD- will continue to monitor    Pertinent Medications: MEDICATIONS  (STANDING):  artificial  tears Solution 1 Drop(s) Both EYES every 12 hours  aspirin enteric coated 81 milliGRAM(s) Oral daily  calcium acetate 667 milliGRAM(s) Oral three times a day with meals  carvedilol 12.5 milliGRAM(s) Oral every 12 hours  cloNIDine 0.1 milliGRAM(s) Oral two times a day  colchicine 0.6 milliGRAM(s) Oral two times a day  dextrose 5%. 1000 milliLiter(s) (50 mL/Hr) IV Continuous <Continuous>  dextrose 50% Injectable 12.5 Gram(s) IV Push once  dextrose 50% Injectable 25 Gram(s) IV Push once  dextrose 50% Injectable 25 Gram(s) IV Push once  epoetin gian Injectable 09318 Unit(s) IV Push <User Schedule>  ergocalciferol 06873 Unit(s) Oral every week  hydrALAZINE 50 milliGRAM(s) Oral every 8 hours  insulin lispro (HumaLOG) corrective regimen sliding scale   SubCutaneous three times a day before meals  insulin lispro (HumaLOG) corrective regimen sliding scale   SubCutaneous at bedtime  metoclopramide Injectable 10 milliGRAM(s) IV Push every 12 hours  pantoprazole    Tablet 40 milliGRAM(s) Oral two times a day  PPD  5 Tuberculin Unit(s) Injectable 5 Unit(s) IntraDermal once  simvastatin 40 milliGRAM(s) Oral at bedtime    MEDICATIONS  (PRN):  acetaminophen   Tablet 650 milliGRAM(s) Oral every 6 hours PRN For Temp greater than 38 C (100.4 F)  acetaminophen   Tablet. 650 milliGRAM(s) Oral every 6 hours PRN Moderate Pain (4 - 6)  dextrose Gel 1 Dose(s) Oral once PRN Blood Glucose LESS THAN 70 milliGRAM(s)/deciliter  docusate sodium 100 milliGRAM(s) Oral two times a day PRN Constipation  glucagon  Injectable 1 milliGRAM(s) IntraMuscular once PRN Glucose LESS THAN 70 milligrams/deciliter  hydrALAZINE Injectable 20 milliGRAM(s) IV Push every 6 hours PRN SBP > 160  ondansetron Injectable 4 milliGRAM(s) IV Push every 6 hours PRN Nausea  polyethylene glycol 3350 17 Gram(s) Oral daily PRN Constipation  senna 2 Tablet(s) Oral at bedtime PRN Constipation    Pertinent Labs: CBC Full  -  ( 26 Mar 2018 05:52 )  WBC Count : 7.8 K/uL  Hemoglobin : 8.4 g/dL  Hematocrit : 26.8 %  Platelet Count - Automated : 392 K/uL  Mean Cell Volume : 88.2 fl  Mean Cell Hemoglobin : 27.6 pg  Mean Cell Hemoglobin Concentration : 31.3 g/dL  03-26 Na130 mmol/L<L> Glu 201 mg/dL<H> K+ 4.2 mmol/L Cr  4.04 mg/dL<H> BUN 21.0 mg/dL<H> Phos 1.6 mg/dL<L> Alb 2.4 g/dL<L> PAB n/a       Skin: no skin breakdown noted    Nutrition focused physical exam NOT conducted - found signs of malnutrition [ ]absent [ ]present    Subcutaneous fat loss: [ ] Orbital fat pads region, [ ]Buccal fat region, [ ]Triceps region,  [ ]Ribs region    Muscle wasting: [ ]Temples region, [ ]Clavicle region, [ ]Shoulder region, [ ]Scapula region, [ ]Interosseous region,  [ ]thigh region, [ ]Calf region    Estimated Needs:   [x ] no change since previous assessment  [ ] recalculated:     Current Nutrition Diagnosis: Pt remains at nutrition risk secondary to altered nutrition labs related to DM, anemia and ARF requiring HD as evidenced by low H/H, Na, elevated BUN, creat and glu.    Dialysis and cons cho meal plan literature left at bedside (Italian).  RD to remain available.    Recommendations:   Change diet to renal replacement, cons cho, 1200ml fluid restriction with Nepro daily.  Daily wts  Neprovite daily    Monitoring and Evaluation:   [x ] PO intake [x ] Tolerance to diet prescription [X] Weights  [X] Follow up per protocol [X] Labs:

## 2018-03-26 NOTE — PROGRESS NOTE ADULT - SUBJECTIVE AND OBJECTIVE BOX
NEPHROLOGY INTERVAL HPI/OVERNIGHT EVENTS:    No new events.    MEDICATIONS  (STANDING):  artificial  tears Solution 1 Drop(s) Both EYES every 12 hours  aspirin enteric coated 81 milliGRAM(s) Oral daily  calcium acetate 667 milliGRAM(s) Oral three times a day with meals  carvedilol 12.5 milliGRAM(s) Oral every 12 hours  cloNIDine 0.1 milliGRAM(s) Oral two times a day  colchicine 0.6 milliGRAM(s) Oral two times a day  dextrose 5%. 1000 milliLiter(s) (50 mL/Hr) IV Continuous <Continuous>  dextrose 50% Injectable 12.5 Gram(s) IV Push once  dextrose 50% Injectable 25 Gram(s) IV Push once  dextrose 50% Injectable 25 Gram(s) IV Push once  docusate sodium 100 milliGRAM(s) Oral two times a day  epoetin gian Injectable 14471 Unit(s) IV Push <User Schedule>  ergocalciferol 41074 Unit(s) Oral every week  furosemide    Tablet 20 milliGRAM(s) Oral daily  hydrALAZINE 25 milliGRAM(s) Oral every 8 hours  insulin lispro (HumaLOG) corrective regimen sliding scale   SubCutaneous three times a day before meals  insulin lispro (HumaLOG) corrective regimen sliding scale   SubCutaneous at bedtime  metoclopramide Injectable 10 milliGRAM(s) IV Push every 12 hours  pantoprazole    Tablet 40 milliGRAM(s) Oral two times a day  polyethylene glycol 3350 17 Gram(s) Oral daily  PPD  5 Tuberculin Unit(s) Injectable 5 Unit(s) IntraDermal once  senna 2 Tablet(s) Oral at bedtime  simvastatin 40 milliGRAM(s) Oral at bedtime    MEDICATIONS  (PRN):  acetaminophen   Tablet 650 milliGRAM(s) Oral every 6 hours PRN For Temp greater than 38 C (100.4 F)  acetaminophen   Tablet. 650 milliGRAM(s) Oral every 6 hours PRN Moderate Pain (4 - 6)  dextrose Gel 1 Dose(s) Oral once PRN Blood Glucose LESS THAN 70 milliGRAM(s)/deciliter  glucagon  Injectable 1 milliGRAM(s) IntraMuscular once PRN Glucose LESS THAN 70 milligrams/deciliter  hydrALAZINE Injectable 20 milliGRAM(s) IV Push every 6 hours PRN SBP > 160  ondansetron Injectable 4 milliGRAM(s) IV Push every 6 hours PRN Nausea      Allergies    No Known Allergies        Vital Signs Last 24 Hrs    T(C): 36.8 (26 Mar 2018 06:05), Max: 37.1 (25 Mar 2018 21:17)  T(F): 98.2 (26 Mar 2018 06:05), Max: 98.7 (25 Mar 2018 21:17)  HR: 72 (26 Mar 2018 08:44) (64 - 76)  BP: 170/87 (26 Mar 2018 06:05) (146/63 - 170/87)  BP(mean): --  RR: 16 (26 Mar 2018 08:44) (16 - 20)  SpO2: 96% (26 Mar 2018 08:44) (92% - 96%)  T(C): 37 (25 Mar 2018 10:00), Max: 37.2 (24 Mar 2018 21:06)  T(F): 98.6 (25 Mar 2018 10:00), Max: 98.9 (24 Mar 2018 21:06)  HR: 78 (25 Mar 2018 10:00) (73 - 80)  BP: 138/70 (25 Mar 2018 10:00) (138/70 - 183/92)  BP(mean): --  RR: 16 (25 Mar 2018 10:00) (16 - 18)  SpO2: 95% (25 Mar 2018 10:00) (93% - 96%)    T(C): 37.3 (24 Mar 2018 06:00), Max: 37.3 (24 Mar 2018 06:00)  T(F): 99.1 (24 Mar 2018 06:00), Max: 99.1 (24 Mar 2018 06:00)  HR: 78 (24 Mar 2018 06:00) (64 - 78)  BP: 162/84 (24 Mar 2018 06:00) (134/68 - 194/99)  BP(mean): --  RR: 18 (24 Mar 2018 06:00) (13 - 23)  SpO2: 97% (24 Mar 2018 06:00) (95% - 99%)  T(C): 36.7 (22 Mar 2018 05:20), Max: 36.8 (21 Mar 2018 21:01)  T(F): 98 (22 Mar 2018 05:20), Max: 98.2 (21 Mar 2018 21:01)  HR: 79 (22 Mar 2018 05:20) (66 - 79)  BP: 194/94 (22 Mar 2018 05:20) (141/72 - 194/94)  BP(mean): --  RR: 16 (22 Mar 2018 05:20) (16 - 18)  SpO2: 99% (22 Mar 2018 05:20) (92% - 100%)    PHYSICAL EXAM:  GENERAL: Feels same  HEAD:  Periorbital edema better  EYES: Same  NECK: Supple, No JVD, right permacath  site  clean  NERVOUS SYSTEM:  Alert & Oriented ,    CHEST/LUNG: Diminished BS bilaterally (L > R)   HEART: Regular rate and rhythm; No rub, murmur same  ABDOMEN: Soft, +BS  EXTREMITIES:  trace edema  SKIN: No rashes or lesions      LABS:  03-26    130<L>  |  91<L>  |  21.0<H>  ----------------------------<  201<H>  4.2   |  25.0  |  4.04<H>    Ca    8.4<L>      26 Mar 2018 05:52  Phos  1.6     03-26  Mg     1.6     03-26    TPro  6.1<L>  /  Alb  2.4<L>  /  TBili  0.4  /  DBili  x   /  AST  42<H>  /  ALT  29  /  AlkPhos  176<H>  03-26 03-25    134<L>  |  96<L>  |  13.0  ----------------------------<  154<H>  4.0   |  26.0  |  2.58<H>    Ca    8.0<L>      25 Mar 2018 05:34        03-24    130<L>  |  93<L>  |  25.0<H>  ----------------------------<  175<H>  3.9   |  25.0  |  3.38<H>    Ca    8.0<L>      24 Mar 2018 07:08  Phos  2.3     03-23  Mg     1.8     03-23                            8.9    8.1   )-----------( 514      ( 22 Mar 2018 06:35 )             26.9     03-22    127<L>  |  89<L>  |  37.0<H>  ----------------------------<  154<H>  4.0   |  22.0  |  5.21<H>    Ca    8.0<L>      22 Mar 2018 06:35    TPro  6.0<L>  /  Alb  2.1<L>  /  TBili  0.3<L>  /  DBili  x   /  AST  62<H>  /  ALT  39<H>  /  AlkPhos  121<H>  03-20            RADIOLOGY & ADDITIONAL TESTS:

## 2018-03-26 NOTE — PROGRESS NOTE ADULT - SUBJECTIVE AND OBJECTIVE BOX
Patient is a 57y old  Female who presents with a chief complaint of Nausea/vomiting and Chest pain (16 Mar 2018 17:20)  Had MARSHALL with CKD now requiring HD  Has had several thoracentesis  S/P cardiac cath with normal cors  C/O RUE discomfort and bruising noted. RN also noted several watery stools    PAST MEDICAL & SURGICAL HISTORY:  HLD (hyperlipidemia)  HTN (hypertension)  DM (diabetes mellitus)  No significant past surgical history    MEDICATIONS  (STANDING):  artificial  tears Solution 1 Drop(s) Both EYES every 12 hours  aspirin enteric coated 81 milliGRAM(s) Oral daily  calcium acetate 667 milliGRAM(s) Oral three times a day with meals  carvedilol 12.5 milliGRAM(s) Oral every 12 hours  cloNIDine 0.1 milliGRAM(s) Oral two times a day  colchicine 0.6 milliGRAM(s) Oral two times a day  dextrose 5%. 1000 milliLiter(s) (50 mL/Hr) IV Continuous <Continuous>  dextrose 50% Injectable 12.5 Gram(s) IV Push once  dextrose 50% Injectable 25 Gram(s) IV Push once  dextrose 50% Injectable 25 Gram(s) IV Push once  docusate sodium 100 milliGRAM(s) Oral two times a day  epoetin gian Injectable 89613 Unit(s) IV Push <User Schedule>  ergocalciferol 50678 Unit(s) Oral every week  hydrALAZINE 25 milliGRAM(s) Oral every 8 hours  insulin lispro (HumaLOG) corrective regimen sliding scale   SubCutaneous three times a day before meals  insulin lispro (HumaLOG) corrective regimen sliding scale   SubCutaneous at bedtime  metoclopramide Injectable 10 milliGRAM(s) IV Push every 12 hours  pantoprazole    Tablet 40 milliGRAM(s) Oral two times a day  polyethylene glycol 3350 17 Gram(s) Oral daily  PPD  5 Tuberculin Unit(s) Injectable 5 Unit(s) IntraDermal once  senna 2 Tablet(s) Oral at bedtime  simvastatin 40 milliGRAM(s) Oral at bedtime    MEDICATIONS  (PRN):  acetaminophen   Tablet 650 milliGRAM(s) Oral every 6 hours PRN For Temp greater than 38 C (100.4 F)  acetaminophen   Tablet. 650 milliGRAM(s) Oral every 6 hours PRN Moderate Pain (4 - 6)  dextrose Gel 1 Dose(s) Oral once PRN Blood Glucose LESS THAN 70 milliGRAM(s)/deciliter  glucagon  Injectable 1 milliGRAM(s) IntraMuscular once PRN Glucose LESS THAN 70 milligrams/deciliter  hydrALAZINE Injectable 20 milliGRAM(s) IV Push every 6 hours PRN SBP > 160  ondansetron Injectable 4 milliGRAM(s) IV Push every 6 hours PRN Nausea      Allergies:  No Known Allergies    Vital Signs Last 24 Hrs  T(C): 36.8 (26 Mar 2018 06:05), Max: 37.1 (25 Mar 2018 21:17)  T(F): 98.2 (26 Mar 2018 06:05), Max: 98.7 (25 Mar 2018 21:17)  HR: 76 (26 Mar 2018 06:05) (64 - 78)  BP: 170/87 (26 Mar 2018 06:05) (138/70 - 170/87)  BP(mean): --  RR: 17 (26 Mar 2018 06:05) (16 - 20)  SpO2: 92% (25 Mar 2018 21:17) (92% - 96%)  I&O's Detail    25 Mar 2018 07:01  -  26 Mar 2018 07:00  --------------------------------------------------------  IN:    Oral Fluid: 360 mL  Total IN: 360 mL    OUT:  Total OUT: 0 mL    Total NET: 360 mL      Physical Exam:  General: NAD, resting comfortably in bed, generalized anasarca  Extremities: RUE 2+ edema with depedent ecchymosis, R IJ permcath insitu. Site CDI  Pulses:   Right:                                                                          Left:  RAD [X ]2+ [ ]1+ [ ]doppler                                           RAD [X ]2+ [ ]1+ [ ]doppler        LABS:                        8.4    7.8   )-----------( 392      ( 26 Mar 2018 05:52 )             26.8     03-26    130<L>  |  91<L>  |  21.0<H>  ----------------------------<  201<H>  4.2   |  25.0  |  4.04<H>    Ca    8.4<L>      26 Mar 2018 05:52  Phos  1.6     03-26  Mg     1.6     03-26    TPro  6.1<L>  /  Alb  2.4<L>  /  TBili  0.4  /  DBili  x   /  AST  42<H>  /  ALT  29  /  AlkPhos  176<H>  03-26      CAPILLARY BLOOD GLUCOSE  POCT Blood Glucose.: 210 mg/dL (26 Mar 2018 07:51)  POCT Blood Glucose.: 250 mg/dL (25 Mar 2018 22:50)  POCT Blood Glucose.: 192 mg/dL (25 Mar 2018 17:29)  POCT Blood Glucose.: 185 mg/dL (25 Mar 2018 11:26)    Radiology and Additional Studies:    Assessment and Plan: 57y Female Anemia with CKD now requiring HD  Preop for L AVF/Graft tent OR 3/28  Cardiology clearance on chart  Cont HD via permcat

## 2018-03-26 NOTE — PROGRESS NOTE ADULT - PROBLEM SELECTOR PLAN 3
post procedure with pulses & movement in tact, along with normal temperature  Ecchymosis stable and fading compared with yesterday.  Us of right arm reported superficial basilic vein distal thrombus but no didn't report DVT, pseudoaneurysm or hematoma likely 2/2 superficial thrombus  Us of right arm reported superficial basilic vein distal thrombus but no DVT, pseudoaneurysm or hematoma  elevation and compresses

## 2018-03-26 NOTE — PROGRESS NOTE ADULT - PROBLEM SELECTOR PLAN 8
No chemical ac given severe anemia   SCDs while in bed  Ambulate with assistance likely 2/2 stool softeners. d/c all stool softeners and observe

## 2018-03-26 NOTE — PROGRESS NOTE ADULT - SUBJECTIVE AND OBJECTIVE BOX
Patient is a 57y old  Female who presents with a chief complaint of Nausea/vomiting and Chest pain (16 Mar 2018 17:20)      HPI:  Ms. Elizabeth is a 56 yo F with PMH s/f HTN/HLD, DMII, hyperkalemia who presents with 3 weeks of postprandial vomiting and stomach pain. Patient denies abdominal pain, nausea  and vomiting. On solid diet. Guiac negative. Has uremia pericarditis.       REVIEW OF SYSTEMS:  Constitutional: No fever, weight loss or fatigue  ENMT:  No difficulty hearing, tinnitus, vertigo; No sinus or throat pain  Respiratory: No cough, wheezing, chills or hemoptysis  Cardiovascular: No chest pain, palpitations, dizziness or leg swelling  Gastrointestinal: No abdominal or epigastric pain. No nausea, vomiting or hematemesis; No diarrhea or constipation. No melena or hematochezia.  Skin: No itching, burning, rashes or lesions   Musculoskeletal: No joint pain or swelling; No muscle, back or extremity pain    PAST MEDICAL & SURGICAL HISTORY:  HLD (hyperlipidemia)  HTN (hypertension)  DM (diabetes mellitus)  No significant past surgical history      FAMILY HISTORY:  No pertinent family history in first degree relatives      SOCIAL HISTORY:  Smoking Status: [ ] Current, [ ] Former, [ ] Never  Pack Years:  [  ] EtOH-no  [  ] IVDA-no    MEDICATIONS:  MEDICATIONS  (STANDING):  artificial  tears Solution 1 Drop(s) Both EYES every 12 hours  aspirin enteric coated 81 milliGRAM(s) Oral daily  calcium acetate 667 milliGRAM(s) Oral three times a day with meals  carvedilol 12.5 milliGRAM(s) Oral every 12 hours  cloNIDine 0.1 milliGRAM(s) Oral two times a day  colchicine 0.6 milliGRAM(s) Oral two times a day  dextrose 5%. 1000 milliLiter(s) (50 mL/Hr) IV Continuous <Continuous>  dextrose 50% Injectable 12.5 Gram(s) IV Push once  dextrose 50% Injectable 25 Gram(s) IV Push once  dextrose 50% Injectable 25 Gram(s) IV Push once  docusate sodium 100 milliGRAM(s) Oral two times a day  epoetin gian Injectable 90056 Unit(s) IV Push <User Schedule>  ergocalciferol 82316 Unit(s) Oral every week  hydrALAZINE 25 milliGRAM(s) Oral every 8 hours  insulin lispro (HumaLOG) corrective regimen sliding scale   SubCutaneous three times a day before meals  insulin lispro (HumaLOG) corrective regimen sliding scale   SubCutaneous at bedtime  metoclopramide Injectable 10 milliGRAM(s) IV Push every 12 hours  pantoprazole    Tablet 40 milliGRAM(s) Oral two times a day  polyethylene glycol 3350 17 Gram(s) Oral daily  PPD  5 Tuberculin Unit(s) Injectable 5 Unit(s) IntraDermal once  senna 2 Tablet(s) Oral at bedtime  simvastatin 40 milliGRAM(s) Oral at bedtime    MEDICATIONS  (PRN):  acetaminophen   Tablet 650 milliGRAM(s) Oral every 6 hours PRN For Temp greater than 38 C (100.4 F)  acetaminophen   Tablet. 650 milliGRAM(s) Oral every 6 hours PRN Moderate Pain (4 - 6)  dextrose Gel 1 Dose(s) Oral once PRN Blood Glucose LESS THAN 70 milliGRAM(s)/deciliter  glucagon  Injectable 1 milliGRAM(s) IntraMuscular once PRN Glucose LESS THAN 70 milligrams/deciliter  hydrALAZINE Injectable 20 milliGRAM(s) IV Push every 6 hours PRN SBP > 160  ondansetron Injectable 4 milliGRAM(s) IV Push every 6 hours PRN Nausea      Allergies    No Known Allergies    Intolerances        Vital Signs Last 24 Hrs  T(C): 36.8 (26 Mar 2018 06:05), Max: 37.1 (25 Mar 2018 21:17)  T(F): 98.2 (26 Mar 2018 06:05), Max: 98.7 (25 Mar 2018 21:17)  HR: 76 (26 Mar 2018 06:05) (64 - 78)  BP: 170/87 (26 Mar 2018 06:05) (138/70 - 170/87)  BP(mean): --  RR: 17 (26 Mar 2018 06:05) (16 - 20)  SpO2: 92% (25 Mar 2018 21:17) (92% - 96%)    03-25 @ 07:01  -  03-26 @ 07:00  --------------------------------------------------------  IN: 360 mL / OUT: 0 mL / NET: 360 mL          PHYSICAL EXAM:    General: Well developed; well nourished; in no acute distress  HEENT: MMM, conjunctiva and sclera clear  H- RRR  l -CTA  Gastrointestinal: Soft, non-tender non-distended; Normal bowel sounds; No rebound or guarding  Extremities: Normal range of motion, No clubbing, cyanosis or edema  Neurological: Alert and oriented x3  Skin: Warm and dry. No obvious rash      LABS:                        8.4    7.8   )-----------( 392      ( 26 Mar 2018 05:52 )             26.8     26 Mar 2018 05:52    130    |  91     |  21.0   ----------------------------<  201    4.2     |  25.0   |  4.04     Ca    8.4        26 Mar 2018 05:52  Phos  1.6       26 Mar 2018 05:52  Mg     1.6       26 Mar 2018 05:52    TPro  6.1    /  Alb  2.4    /  TBili  0.4    /  DBili  x      /  AST  42     /  ALT  29     /  AlkPhos  176    / Amylase x      /Lipase x      26 Mar 2018 05:52              RADIOLOGY & ADDITIONAL STUDIES:     < from: Xray Chest 1 View AP/PA. (03.21.18 @ 11:51) >  IMPRESSION:    Support Devices: None  Heart: Cardiomegaly  Mediastinum:  Unremarkable  Lungs/Airways: Decreased left pleural effusion. No significant   pneumothorax. Multiple moderate right pleural effusion.  Bones/Soft tissues: Unremarkable    < end of copied text >

## 2018-03-26 NOTE — PROGRESS NOTE ADULT - PROBLEM SELECTOR PLAN 7
HBA1C: 7.6   continue sliding scale. 3 UI required in 24 hrs. Will consider other coverage if needed  -c/w Accu-Cheks ac hs tid   -c/w hypoglycemia protocol

## 2018-03-26 NOTE — PROGRESS NOTE ADULT - PROBLEM SELECTOR PLAN 6
-c/w clonidine and will up titrate as needed  -c/w hydralazine 25mgpo q 8hrs  - continue coreg and Lasix   HD also helping with HTN not well controlled with elevations going up to 170's systolic   -c/w clonidine, increase hydralazine to 50mg tid and monitor   - continue coreg   HD also helping with HTN

## 2018-03-26 NOTE — PROGRESS NOTE ADULT - PROBLEM SELECTOR PLAN 5
likely Anemia of chronic disease due to worsening acute on chronic renal insufficiency in the setting of CKD,  and concomitant  iron deficiency   -s/p 2 units pRBC on admission with improvement in h/h , another unit pRBC 3/23  -occult stool neg  -c/w ppi po bid   -GI consulted, deferred EGD until later this week. Cleared by Cardio   continue EPOGEN with HD and ferrous sulfate  Keep Hgb over 8

## 2018-03-26 NOTE — PROGRESS NOTE ADULT - ASSESSMENT
57 y.o. F with hx of HTN, HLD, DMII, hyperkalemia, ckd 3 who presented with 3 weeks of postprandial vomiting and abd pain, noted to have ARF, fluid overload with bilateral pleural effusions and pericardial effusion as well as symptomatic anemia likely multifactorial secondary to acute on chronic renal failure and suspected upper GI Bleeding from vomiting/retching and NSAID use. Clinical concern that worsening acute on chronic renal failure possibly caused uremia related sx of N/V, with patients NSAID use and retching suspected GI bleed but occult blood returned negative, for dropping h/h pt received 2 u prbc on admission with appropriate response to therapy. 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 In the interim while being dialyzed the patient had an episode of PAF, s/p cardizem became rate controlled and remained in NSR. EP consulted. Unable to AC the patient due to initial concern for GI bleeding, for which GI consulted and pt pending possible GI intervention when respiratory status improves and when cleared from the cardiac standpoint. Clinical concern for ARF causing volume overload, unclear if HFPEF is a contributing factor. Now presenting right arm ecchymosis on the cath side. Us of right arm reported superficial basilic vein distal thrombus but no didn't report DVT, pseudoaneurysm or hematoma    S/p R thoracocentesis on 3/20 with 1100 cc removed  S/P pleurocentesis done 3/21 on left sided with removal of 1 L. Fluid analysis consistent with transudate   s/p cath on 03/24/2018, NEG, EF 55%  Planning for endoscopy by GI, workup for anemia   and A/V fistula on 03/28/2018 as per vascular surgery

## 2018-03-26 NOTE — PROGRESS NOTE ADULT - ASSESSMENT
Assessment and Plan:   · Assessment		  Ms. Elizabeth is a 56 yo Romanian speaking female with hx of hypertension, hyperlipidemia, type 2 diabetes, with 1 month hx of vomiting and abdominal pain, with NSAID use.  Developed pleuritic chest pain, worsened in supine position and with deep breaths.  Chief complaint of N/V and dark stools noted to be in acute pulm edema/pericardial effusion with ARF, underwent US guided Right thoracentesis, mild discomfort noted, seen by Team and ordered Tylenol.  Also complained of no BM x 3 days, added sorbitol and informed PCP to follow.   Also cardiac cath cancelled yesterday due to respiratory distress.      1. perioperative risk stratification - Overall low cardiac risk for any procedure, including endoscopy and AVF.  Proceed with procedures without any further cardiac diagnostic interventions.  Continue carvedilol perioperatively.  1. pleural effusions - s/p bilateral thoracentesis (1L each side), breathing much improved.    2. abdominal pain/nausea/vomiting- no further symptoms, awaiting endoscopy  3. uremic pericarditis- pain well controlled, continue colchicine for a total of 4 weeks.   4. anemia - may be due to chronic disease,  GI workup pending.  CKD  Keep Hb > 9.    5 hypertension- well controlled, continue carvedilol, hydralazine, clonidine, lasix.  Uptitrate as needed.    5. moderate pericardial effusion - hemodynamically stable, chronic. No further interventions warranted.    6. acute on chronic renal insufficiency -   HD per renal  8. paroxysmal atrial fibrillation - newly diagnosed; no AC for now given anemia, will need GI clearance; appreciate EP input regarding KELLI closure candidacy. Will perform GERALD prior to discharge to assess KELLI anatomy and if suitable for KELLI closure.  9. chest pain - likely due to pericarditis, no obstructive CAD on cath.  10. R arm pain and swelling- ?cellulitis ? thrombophlebitis, will defer to primary team, consider RUE US    Thank you for allowing me to participate in care of your patient.   Will follow

## 2018-03-26 NOTE — PROGRESS NOTE ADULT - PROBLEM SELECTOR PLAN 4
-No further episodes of AFib  -no AC at this time until GI work up completed  [facility] Cardiology following  Rates well controlled on b blocker  S/P cath, neg ( done because of chest Pain and Mild elevated troponin on admission ) -No further episodes of AFib  -no AC at this time until GI work up completed  [facility] Cardiology following and cleared for endoscopy  Rates well controlled on b blocker  S/P cath, neg ( done because of chest Pain and Mild elevated troponin on admission )

## 2018-03-26 NOTE — PROGRESS NOTE ADULT - SUBJECTIVE AND OBJECTIVE BOX
Patient is a 57y old  Female who presents with a chief complaint of Nausea/vomiting and Chest pain (16 Mar 2018 17:20)    INTERVAL HPI/OVERNIGHT EVENTS:  Patient is a 58 y/o Female seen and examined at bedside. Patient noted to have right arm painful bruising, now stable-decreasing, ipsilateral to catheterization from Friday. Patient has been doing well, slept well, no constipation but increased watery BM as per nurse. patient denies. Patient denying CP, SOB, palpitations, leg swelling, lightheadedness/dizziness, paresthesias. Otherwise ROS unremarkable.  LBM: 2018. HD schedule: T/T/S    On monitor: NSR, no events      Vital Signs Last 24 Hrs  T(C): 36.8 (26 Mar 2018 06:05), Max: 37.1 (25 Mar 2018 21:17)  T(F): 98.2 (26 Mar 2018 06:05), Max: 98.7 (25 Mar 2018 21:17)  HR: 76 (26 Mar 2018 06:05) (64 - 78)  BP: 170/87 (26 Mar 2018 06:05) (138/70 - 170/87)  RR: 17 (26 Mar 2018 06:05) (16 - 20)  SpO2: 92% (25 Mar 2018 21:17) (92% - 96%)    PHYSICAL EXAM:    GENERAL: NAD, well-groomed, well-developed  HEENT: eomi, perrla, MMM  CHEST/LUNG: fair b/l ae. b/l base crackles noted   HEART: S1S2nl, no r/g noted now. + 3/6 SM. No pericardial rub noted   ABDOMEN: Soft, Nontender, Nondistended; Bowel sounds present  EXTREMITIES:  2+ Peripheral Pulses, No clubbing, cyanosis, or edema in b/l LE. RUE with swelling to fingers with medial upper arm ecchymosis, same size as yesterday bu now fading. Pulses in right arm palpable with normal temp  Skin: right sided PC in place (neck)  NERVOUS SYSTEM:  Alert & Oriented X3, Good concentration; Motor Strength 5/5 B/L upper and lower extremities; DTRs 2+ intact and symmetric    LABS:                          8.4    7.8   )-----------( 392      ( 26 Mar 2018 05:52 )             26.8       03-    130<L>  |  91<L>  |  21.0<H>  ----------------------------<  201<H>  4.2   |  25.0  |  4.04<H>    Ca    8.4<L>      26 Mar 2018 05:52  Phos  1.6       Mg     1.6         TPro  6.1<L>  /  Alb  2.4<L>  /  TBili  0.4  /  DBili  x   /  AST  42<H>  /  ALT  29  /  AlkPhos  176<H>            Hemoglobin A1C, Whole Blood: 7.5 % ( @ 11:12)    Culture Results:   No growth at 2 days.  Culture in progress ( @ 11:30)  Culture Results:   No growth at 3 days.  Culture in progress ( @ 12:48)  Culture Results:   <10,000 CFU/ml Gram Positive Cocci in Clusters  <10,000 CFU/ml Gram Positive Rods ( @ 21:37)  Culture Results:   No growth at 5 days. ( @ 22:13)  Culture Results:   No growth at 5 days. ( @ 22:12)    RADIOLOGY & ADDITIONAL TESTS:       EXAM:  US [employer] ARTS [employer] RT                        PROCEDURE DATE:  2018    INTERPRETATION:  HISTORY: Right radial artery catheterization. Pain.   Pseudoaneurysm.  TECHNIQUE: Grey-scale and color-flow Doppler imaging   FINDINGS: No evidence of hematoma or pseudoaneurysm seen. Right radial   and ulnar arteries are patent.  IMPRESSION: No evidence of pseudoaneurysm  NISHA ZAVALA M.D., ATTENDING RADIOLOGIST  This document has been electronically signed. Mar 25 2018  4:24PM      EXAM:  US JOINT NONVASC EXT LTD RT                        PROCEDURE DATE:  2018    INTERPRETATION:  Clinical information: Worsening pain at site of IV   infiltration in medial aspect of right arm  Sonography of the right upper extremity was performed at the area of   clinical concern.  COMPARISON: None  FINDINGS/  IMPRESSION: No mass, hematoma or fluid collection is identified.  SYLVIE DAILEY M.D., ATTENDING RADIOLOGIST  This document has been electronically signed. Mar 25 2018  4:22PM           EXAM:  US DPLX [employer] EXT VEINS LTD RT                        PROCEDURE DATE:  2018    INTERPRETATION:  Right upper extremity venous Doppler ultrasound   COMPARISON: None.  CLINICAL INFORMATION: Swelling, pain.  FINDINGS:  Right subclavian vein has limited visualization due to catheter placement  The right internal jugular, axillary, brachial, radial and ulnar veins show normal flow and are compressible where feasible, without evidence of DVT.  The cephalic vein is patent and compressible.  The basilic vein contains a focal thrombus within the mid forearm distally .  IMPRESSION:  Superficial basilic vein distal thrombus as described.  No evidence of upper extremity DVT. Incomplete evaluation of subclavian vein due to indwelling catheter.  NISHA ZAVALA M.D., ATTENDING RADIOLOGIST  This document has been electronically signed. Mar 25 2018  4:21PM          < from: Xray Chest 1 View AP/PA. (18 @ 11:51) >  IMPRESSION:    Support Devices: None  Heart: Cardiomegaly  Mediastinum:  Unremarkable  Lungs/Airways: Decreased left pleural effusion. No significant   pneumothorax. Multiple moderate right pleural effusion.  Bones/Soft tissues: Unremarkable        [facility]  Department of Cardiology  [address] [place] [address]  [phone]  Cath Lab Report -- Comprehensive Report  Patient: JAROCHO MORALES  Study date: 2018  Account number: [id number]  MR number: [id number]  : 1960  Gender: Female  Race: O  Case Physician(s):  Jesús Godinez MD  Referring Physician:  INDICATIONS: Acute diastolic congestive heart failure.  HISTORY: 56 yo woman with ESKD now on dialysis, admitted with worsening  SOB, orthopnea and anemia. She had bilateral pleural effusion and moderate  pericardial effusion. There was no prior cardiac history. The patient has  hypertension and dialysis-treated renal failure.  PROCEDURE:  --  Left heart catheterization with ventriculography.  --  Left coronary angiography.  --  Right coronary angiography.  TECHNIQUE: The risks and alternatives of the procedures and conscious  sedation were explained to the patient and informed consent was obtained.  Cardiac catheterization performed electively.  Local anesthetic given. Right radial artery access. Left heart  catheterization. Ventriculography was performed. Left coronary artery  angiography. The vessel was injected utilizing a catheter. Right coronary  artery angiography. The vessel was injected utilizing a catheter.  RADIATION EXPOSURE: 3 min.  CONTRAST GIVEN: Omnipaque 60 ml.  MEDICATIONS GIVEN: Verapamil (Isoptin, Calan, Covera), 5 mg, IA. Heparin,  3000 units, IA. 1% Lidocaine, 10 ml, subcutaneously.  VENTRICLES: There were no left ventricular global or regional wall motion  abnormalities. Global left ventricular function was normal. EF calculated  by contrast ventriculography was 55 %.  VALVES: AORTIC VALVE: No significant aortic valve gradient. MITRAL VALVE:  The mitral valve exhibited trivial regurgitation (less than 1+).  CORONARY VESSELS: The coronary circulation is right dominant.  LM:   --  LM: Normal. The vessel was normal sized, not calcified, and not  tortuous. Angiography showed no evidence of disease.  LAD:   --  LAD: Normal. The vessel was normal sized, not calcified, and not  tortuous. Angiography showed minor luminal irregularities with no flow  limiting lesions.  CX:   --  Circumflex: Normal. The vessel was normal sized, not calcified,  and excessively tortuous.Angiography showed no evidence of disease.  RCA:   --  RCA: Normal. The vessel was normal sized, not calcified, and  moderately tortuous. Angiography showed no evidence of disease.  COMPLICATIONS: There were no complications. No complications occurred  during the cath lab visit.  DIAGNOSTIC IMPRESSIONS: There is mild irregularity of the coronary anatomy.  Left ventricular function is normal (LVEF=55%).  DIAGNOSTIC RECOMMENDATIONS: The patient should continue with the present  medications. Patientmanagement should include aggressive medical therapy  and resumption of all previous activities in 2 days.  Prepared and signed by  Jesús Godinez MD  Signed 2018 14:30:17  HEMODYNAMIC TABLES  Pressures:  Baseline  Pressures:  - HR: 68  Pressures:  - Rhythm:  Pressures:  -- Aortic Pressure (S/D/M): 164/83/117  Pressures:  -- Left Ventricle (s/edp): 166/24/--  Outputs:  Baseline  Outputs:  -- CALCULATIONS[age]  Outputs:  -- CALCULATIONS: Body Surface Area: 1.68  Outputs:  -- CALCULATIONS: Height in cm: 157.00  Outputs:  -- CALCULATIONS: Sex: Female  Outputs:  -- CALCULATIONS: Weight in k.60        MEDICATIONS  (STANDING):  artificial  tears Solution 1 Drop(s) Both EYES every 12 hours  aspirin enteric coated 81 milliGRAM(s) Oral daily  calcium acetate 667 milliGRAM(s) Oral three times a day with meals  carvedilol 12.5 milliGRAM(s) Oral every 12 hours  cloNIDine 0.1 milliGRAM(s) Oral two times a day  colchicine 0.6 milliGRAM(s) Oral two times a day  dextrose 5%. 1000 milliLiter(s) (50 mL/Hr) IV Continuous <Continuous>  dextrose 50% Injectable 12.5 Gram(s) IV Push once  dextrose 50% Injectable 25 Gram(s) IV Push once  dextrose 50% Injectable 25 Gram(s) IV Push once  docusate sodium 100 milliGRAM(s) Oral two times a day  epoetin gian Injectable 24747 Unit(s) IV Push <User Schedule>  ergocalciferol 93526 Unit(s) Oral every week  hydrALAZINE 25 milliGRAM(s) Oral every 8 hours  insulin lispro (HumaLOG) corrective regimen sliding scale   SubCutaneous three times a day before meals  insulin lispro (HumaLOG) corrective regimen sliding scale   SubCutaneous at bedtime  metoclopramide Injectable 10 milliGRAM(s) IV Push every 12 hours  pantoprazole    Tablet 40 milliGRAM(s) Oral two times a day  polyethylene glycol 3350 17 Gram(s) Oral daily  PPD  5 Tuberculin Unit(s) Injectable 5 Unit(s) IntraDermal once  senna 2 Tablet(s) Oral at bedtime  simvastatin 40 milliGRAM(s) Oral at bedtime    MEDICATIONS  (PRN):  acetaminophen   Tablet 650 milliGRAM(s) Oral every 6 hours PRN For Temp greater than 38 C (100.4 F)  acetaminophen   Tablet. 650 milliGRAM(s) Oral every 6 hours PRN Moderate Pain (4 - 6)  dextrose Gel 1 Dose(s) Oral once PRN Blood Glucose LESS THAN 70 milliGRAM(s)/deciliter  glucagon  Injectable 1 milliGRAM(s) IntraMuscular once PRN Glucose LESS THAN 70 milligrams/deciliter  hydrALAZINE Injectable 20 milliGRAM(s) IV Push every 6 hours PRN SBP > 160  ondansetron Injectable 4 milliGRAM(s) IV Push every 6 hours PRN Nausea Patient is a 57y old  Female who presents with a chief complaint of Nausea/vomiting and Chest pain (16 Mar 2018 17:20)    INTERVAL HPI/OVERNIGHT EVENTS:  Patient is a 58 y/o Female seen and examined at bedside. Patient noted to have right arm painful bruising, now stable-decreasing, ipsilateral to catheterization from Friday. Patient has been doing well, slept well, no constipation but increased watery BM as per nurse. patient denies. LBM: 2018. HD schedule: T/T/S    ROS:  Patient denying CP, SOB, palpitations, leg swelling, lightheadedness/dizziness, paresthesias. All other ROS negative     Vital Signs Last 24 Hrs  T(C): 36.8 (26 Mar 2018 06:05), Max: 37.1 (25 Mar 2018 21:17)  T(F): 98.2 (26 Mar 2018 06:05), Max: 98.7 (25 Mar 2018 21:17)  HR: 76 (26 Mar 2018 06:05) (64 - 78)  BP: 170/87 (26 Mar 2018 06:05) (138/70 - 170/87)  RR: 17 (26 Mar 2018 06:05) (16 - 20)  SpO2: 92% (25 Mar 2018 21:17) (92% - 96%)  c/s 250-210    PHYSICAL EXAM:    GENERAL: NAD, well-groomed, well-developed  HEENT: eomi, perrla, MMM  CHEST/LUNG: fair b/l ae. b/l base crackles noted   HEART: S1S2nl, no r/g noted now. + 3/6 SM. No pericardial rub noted   ABDOMEN: Soft, Nontender, Nondistended; Bowel sounds present  EXTREMITIES:  2+ Peripheral Pulses, No clubbing, cyanosis, or edema in b/l LE. RUE with swelling to fingers with medial upper arm ecchymosis, same size as yesterday bu now fading. Pulses in right arm palpable with normal temp  Skin: right sided PC in place (neck)  NERVOUS SYSTEM:  Alert & Oriented X3, Good concentration; Motor Strength 5/5 B/L upper and lower extremities; DTRs 2+ intact and symmetric    LABS:                          8.4    7.8   )-----------( 392      ( 26 Mar 2018 05:52 )             26.8       03-    130<L>  |  91<L>  |  21.0<H>  ----------------------------<  201<H>  4.2   |  25.0  |  4.04<H>    Ca    8.4<L>      26 Mar 2018 05:52  Phos  1.6       Mg     1.6         TPro  6.1<L>  /  Alb  2.4<L>  /  TBili  0.4  /  DBili  x   /  AST  42<H>  /  ALT  29  /  AlkPhos  176<H>            Hemoglobin A1C, Whole Blood: 7.5 % ( @ 11:12)    Culture Results:   No growth at 2 days.  Culture in progress ( @ 11:30)  Culture Results:   No growth at 3 days.  Culture in progress ( @ 12:48)  Culture Results:   <10,000 CFU/ml Gram Positive Cocci in Clusters  <10,000 CFU/ml Gram Positive Rods ( @ 21:37)  Culture Results:   No growth at 5 days. ( @ 22:13)  Culture Results:   No growth at 5 days. ( @ 22:12)    RADIOLOGY & ADDITIONAL TESTS:       EXAM:  US [employer] ARTS [employer] RT                        PROCEDURE DATE:  2018    INTERPRETATION:  HISTORY: Right radial artery catheterization. Pain.   Pseudoaneurysm.  TECHNIQUE: Grey-scale and color-flow Doppler imaging   FINDINGS: No evidence of hematoma or pseudoaneurysm seen. Right radial   and ulnar arteries are patent.  IMPRESSION: No evidence of pseudoaneurysm  NISHA ZAVALA M.D., ATTENDING RADIOLOGIST  This document has been electronically signed. Mar 25 2018  4:24PM      EXAM:  US JOINT NONVASC EXT LTD RT                        PROCEDURE DATE:  2018    INTERPRETATION:  Clinical information: Worsening pain at site of IV   infiltration in medial aspect of right arm  Sonography of the right upper extremity was performed at the area of   clinical concern.  COMPARISON: None  FINDINGS/  IMPRESSION: No mass, hematoma or fluid collection is identified.  SYLVIE DAILEY M.D., ATTENDING RADIOLOGIST  This document has been electronically signed. Mar 25 2018  4:22PM           EXAM:  US DPLX [employer] EXT VEINS LTD RT                        PROCEDURE DATE:  2018    INTERPRETATION:  Right upper extremity venous Doppler ultrasound   COMPARISON: None.  CLINICAL INFORMATION: Swelling, pain.  FINDINGS:  Right subclavian vein has limited visualization due to catheter placement  The right internal jugular, axillary, brachial, radial and ulnar veins show normal flow and are compressible where feasible, without evidence of DVT.  The cephalic vein is patent and compressible.  The basilic vein contains a focal thrombus within the mid forearm distally .  IMPRESSION:  Superficial basilic vein distal thrombus as described.  No evidence of upper extremity DVT. Incomplete evaluation of subclavian vein due to indwelling catheter.  NISHA ZAVALA M.D., ATTENDING RADIOLOGIST  This document has been electronically signed. Mar 25 2018  4:21PM          < from: Xray Chest 1 View AP/PA. (18 @ 11:51) >  IMPRESSION:    Support Devices: None  Heart: Cardiomegaly  Mediastinum:  Unremarkable  Lungs/Airways: Decreased left pleural effusion. No significant   pneumothorax. Multiple moderate right pleural effusion.  Bones/Soft tissues: Unremarkable        [facility]  Department of Cardiology  [address] [place] [address]  [phone]  Cath Lab Report -- Comprehensive Report  Patient: JAROCHO MORALES  Study date: 2018  Account number: [id number]  MR number: [id number]  : 1960  Gender: Female  Race: O  Case Physician(s):  Jesús Godinez MD  Referring Physician:  INDICATIONS: Acute diastolic congestive heart failure.  HISTORY: 56 yo woman with ESKD now on dialysis, admitted with worsening  SOB, orthopnea and anemia. She had bilateral pleural effusion and moderate  pericardial effusion. There was no prior cardiac history. The patient has  hypertension and dialysis-treated renal failure.  PROCEDURE:  --  Left heart catheterization with ventriculography.  --  Left coronary angiography.  --  Right coronary angiography.  TECHNIQUE: The risks and alternatives of the procedures and conscious  sedation were explained to the patient and informed consent was obtained.  Cardiac catheterization performed electively.  Local anesthetic given. Right radial artery access. Left heart  catheterization. Ventriculography was performed. Left coronary artery  angiography. The vessel was injected utilizing a catheter. Right coronary  artery angiography. The vessel was injected utilizing a catheter.  RADIATION EXPOSURE: 3 min.  CONTRAST GIVEN: Omnipaque 60 ml.  MEDICATIONS GIVEN: Verapamil (Isoptin, Calan, Covera), 5 mg, IA. Heparin,  3000 units, IA. 1% Lidocaine, 10 ml, subcutaneously.  VENTRICLES: There were no left ventricular global or regional wall motion  abnormalities. Global left ventricular function was normal. EF calculated  by contrast ventriculography was 55 %.  VALVES: AORTIC VALVE: No significant aortic valve gradient. MITRAL VALVE:  The mitral valve exhibited trivial regurgitation (less than 1+).  CORONARY VESSELS: The coronary circulation is right dominant.  LM:   --  LM: Normal. The vessel was normal sized, not calcified, and not  tortuous. Angiography showed no evidence of disease.  LAD:   --  LAD: Normal. The vessel was normal sized, not calcified, and not  tortuous. Angiography showed minor luminal irregularities with no flow  limiting lesions.  CX:   --  Circumflex: Normal. The vessel was normal sized, not calcified,  and excessively tortuous.Angiography showed no evidence of disease.  RCA:   --  RCA: Normal. The vessel was normal sized, not calcified, and  moderately tortuous. Angiography showed no evidence of disease.  COMPLICATIONS: There were no complications. No complications occurred  during the cath lab visit.  DIAGNOSTIC IMPRESSIONS: There is mild irregularity of the coronary anatomy.  Left ventricular function is normal (LVEF=55%).  DIAGNOSTIC RECOMMENDATIONS: The patient should continue with the present  medications. Patientmanagement should include aggressive medical therapy  and resumption of all previous activities in 2 days.  Prepared and signed by  Jesús Godinez MD  Signed 2018 14:30:17  HEMODYNAMIC TABLES  Pressures:  Baseline  Pressures:  - HR: 68  Pressures:  - Rhythm:  Pressures:  -- Aortic Pressure (S/D/M): 164/83/117  Pressures:  -- Left Ventricle (s/edp): 166/24/--  Outputs:  Baseline  Outputs:  -- CALCULATIONS[age]  Outputs:  -- CALCULATIONS: Body Surface Area: 1.68  Outputs:  -- CALCULATIONS: Height in cm: 157.00  Outputs:  -- CALCULATIONS: Sex: Female  Outputs:  -- CALCULATIONS: Weight in k.60        MEDICATIONS  (STANDING):  artificial  tears Solution 1 Drop(s) Both EYES every 12 hours  aspirin enteric coated 81 milliGRAM(s) Oral daily  calcium acetate 667 milliGRAM(s) Oral three times a day with meals  carvedilol 12.5 milliGRAM(s) Oral every 12 hours  cloNIDine 0.1 milliGRAM(s) Oral two times a day  colchicine 0.6 milliGRAM(s) Oral two times a day  dextrose 5%. 1000 milliLiter(s) (50 mL/Hr) IV Continuous <Continuous>  dextrose 50% Injectable 12.5 Gram(s) IV Push once  dextrose 50% Injectable 25 Gram(s) IV Push once  dextrose 50% Injectable 25 Gram(s) IV Push once  docusate sodium 100 milliGRAM(s) Oral two times a day  epoetin gian Injectable 03306 Unit(s) IV Push <User Schedule>  ergocalciferol 78919 Unit(s) Oral every week  hydrALAZINE 25 milliGRAM(s) Oral every 8 hours  insulin lispro (HumaLOG) corrective regimen sliding scale   SubCutaneous three times a day before meals  insulin lispro (HumaLOG) corrective regimen sliding scale   SubCutaneous at bedtime  metoclopramide Injectable 10 milliGRAM(s) IV Push every 12 hours  pantoprazole    Tablet 40 milliGRAM(s) Oral two times a day  polyethylene glycol 3350 17 Gram(s) Oral daily  PPD  5 Tuberculin Unit(s) Injectable 5 Unit(s) IntraDermal once  senna 2 Tablet(s) Oral at bedtime  simvastatin 40 milliGRAM(s) Oral at bedtime    MEDICATIONS  (PRN):  acetaminophen   Tablet 650 milliGRAM(s) Oral every 6 hours PRN For Temp greater than 38 C (100.4 F)  acetaminophen   Tablet. 650 milliGRAM(s) Oral every 6 hours PRN Moderate Pain (4 - 6)  dextrose Gel 1 Dose(s) Oral once PRN Blood Glucose LESS THAN 70 milliGRAM(s)/deciliter  glucagon  Injectable 1 milliGRAM(s) IntraMuscular once PRN Glucose LESS THAN 70 milligrams/deciliter  hydrALAZINE Injectable 20 milliGRAM(s) IV Push every 6 hours PRN SBP > 160  ondansetron Injectable 4 milliGRAM(s) IV Push every 6 hours PRN Nausea

## 2018-03-26 NOTE — PROGRESS NOTE ADULT - PROBLEM SELECTOR PLAN 1
-planning on doing EGD possibly thursday-  will need cardiac clearance ( has uremic pericarditis)  _ getting dialysis monday, wed and friday  - anemia- guiac negative- most likely due to renal failure

## 2018-03-26 NOTE — PROGRESS NOTE ADULT - PROBLEM SELECTOR PLAN 2
baseline baseline CKD 3 now progressed to ESRD requiring HD: with uremic pericarditis requiring dialysis. Now with HD on T/T/S  -Acute hepatitis panel negative,   -ppd at 72 hours 0mm negative  -Permacath on right side, neck. Placed  03/22/2018 per [facility] surgery.  -HD per renal  -planning on AVF/AVG, tentative on 03/28/2018 by vascular  -EPO weekly added on 03/22/18 per nephro baseline CKD 3 now progressed to ESRD requiring HD: with uremic pericarditis requiring dialysis  -Acute hepatitis panel negative,   -ppd at 72 hours 0mm negative  -Permacath on right side, neck. Placed  03/22/2018 per [facility] surgery.  -HD per renal  -planning on AVF/AVG, tentative on 03/28/2018 by vascular  -EPO weekly added on 03/22/18 per nephro

## 2018-03-26 NOTE — PROGRESS NOTE ADULT - PROBLEM SELECTOR PLAN 1
secondary to Uremic Pericarditis now improved   clinically improved after regular HD, received (3/24) and the day before. 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 Per cardiology, Needs total 4 weeks and then stop  -Hemodialysis as per nephrology recommendations.  Fluid analysis consistent with transudate from effusions secondary to Uremic Pericarditis now improved   clinically improved after regular HD, received (3/24) and the day before. 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 Per cardiology, Needs total 4 weeks and then stop - stop date will be 4/12  Fluid analysis consistent with transudate from effusions

## 2018-03-26 NOTE — PROGRESS NOTE ADULT - SUBJECTIVE AND OBJECTIVE BOX
[facility]                                                        Office: [address] [place] [address]                                                       Telephone: [phone]. Fax:[phone]      CARDIOLOGY PROGRESS NOTE   ([facility])    Subjective: Patient seen and examined at bedside.  Denies chest pain, shortness of breath, palpitations, dizziness, nausea, bleeding.  Right arm swelling and pain. Cardiac clearance noted in my note since addendum on 3/23.      CURRENT MEDICATIONS  carvedilol 12.5 milliGRAM(s) Oral every 12 hours  cloNIDine 0.1 milliGRAM(s) Oral two times a day  hydrALAZINE 25 milliGRAM(s) Oral every 8 hours  hydrALAZINE Injectable 20 milliGRAM(s) IV Push every 6 hours PRN  acetaminophen   Tablet 650 milliGRAM(s) Oral every 6 hours PRN  acetaminophen   Tablet. 650 milliGRAM(s) Oral every 6 hours PRN  metoclopramide Injectable 10 milliGRAM(s) IV Push every 12 hours  ondansetron Injectable 4 milliGRAM(s) IV Push every 6 hours PRN  docusate sodium 100 milliGRAM(s) Oral two times a day PRN  pantoprazole    Tablet 40 milliGRAM(s) Oral two times a day  polyethylene glycol 3350 17 Gram(s) Oral daily PRN  senna 2 Tablet(s) Oral at bedtime PRN  colchicine 0.6 milliGRAM(s) Oral two times a day  dextrose 50% Injectable 12.5 Gram(s) IV Push once  dextrose 50% Injectable 25 Gram(s) IV Push once  dextrose 50% Injectable 25 Gram(s) IV Push once  dextrose Gel 1 Dose(s) Oral once PRN  glucagon  Injectable 1 milliGRAM(s) IntraMuscular once PRN  insulin lispro (HumaLOG) corrective regimen sliding scale   SubCutaneous three times a day before meals  insulin lispro (HumaLOG) corrective regimen sliding scale   SubCutaneous at bedtime  simvastatin 40 milliGRAM(s) Oral at bedtime  artificial  tears Solution 1 Drop(s) Both EYES every 12 hours  aspirin enteric coated 81 milliGRAM(s) Oral daily  calcium acetate 667 milliGRAM(s) Oral three times a day with meals  dextrose 5%. 1000 milliLiter(s) IV Continuous <Continuous>  epoetin gian Injectable 17794 Unit(s) IV Push <User Schedule>  ergocalciferol 00548 Unit(s) Oral every week  PPD  5 Tuberculin Unit(s) Injectable 5 Unit(s) IntraDermal once  	  TELEMETRY: SR  Vitals:  T(C): 36.8 (03-26-18 @ 06:05), Max: 37.1 (03-25-18 @ 21:17)  HR: 72 (03-26-18 @ 08:44) (64 - 78)  BP: 170/87 (03-26-18 @ 06:05) (138/70 - 170/87)  RR: 16 (03-26-18 @ 08:44) (16 - 20)  SpO2: 96% (03-26-18 @ 08:44) (92% - 96%)  Wt(kg): --  I&O's Summary    25 Mar 2018 07:01  -  26 Mar 2018 07:00  --------------------------------------------------------  IN: 360 mL / OUT: 0 mL / NET: 360 mL        Daily T(C): 36.8 (03-26-18 @ 06:05), Max: 37.1 (03-25-18 @ 21:17)  HR: 72 (03-26-18 @ 08:44) (64 - 78)  BP: 170/87 (03-26-18 @ 06:05) (138/70 - 170/87)  RR: 16 (03-26-18 @ 08:44) (16 - 20)  SpO2: 96% (03-26-18 @ 08:44) (92% - 96%)  Wt(kg): --    Daily     PHYSICAL EXAM:  Appearance: Normal, NAD	  HEENT:   Normal oral mucosa, PERRL, EOMI	  Lymphatic: No lymphadenopathy  Cardiovascular: Normal S1 S2, No JVD, No murmurs, trace bilateral LE edema  Respiratory: trace bibasilar crackles  Psychiatry: A & O x 3, Mood & affect appropriate  Gastrointestinal:  Soft, Non-tender, + BS	  Skin: No rashes, No ecchymoses, No cyanosis  Neurologic: Non-focal  Extremities: Normal range of motion, No clubbing, cyanosis; RUE warmth, tender  Vascular: Peripheral pulses palpable 2+ bilaterally, warm      DIAGNOSTIC TESTING:  Catheterization: 3/23/18   VENTRICLES: There were no left ventricular global or regional wall motion  abnormalities. Global left ventricular function was normal. EF calculated  by contrast ventriculography was 55 %.  VALVES: AORTIC VALVE: No significant aortic valve gradient. MITRAL VALVE:  The mitral valve exhibited trivial regurgitation (less than 1+).  CORONARY VESSELS: The coronary circulation is right dominant.  LM:   --  LM: Normal. The vessel was normal sized, not calcified, and not  tortuous. Angiography showed no evidence of disease.  LAD:   --  LAD: Normal. The vessel was normal sized, not calcified, and not  tortuous. Angiography showed minor luminal irregularities with no flow  limiting lesions.  CX:   --  Circumflex: Normal. The vessel was normal sized, not calcified,  and excessively tortuous.Angiography showed no evidence of disease.  RCA:   --  RCA: Normal. The vessel was normal sized, not calcified, and  moderately tortuous. Angiography showed no evidence of disease.  COMPLICATIONS: There were no complications. No complications occurred  during the cath lab visit.  DIAGNOSTIC IMPRESSIONS: There is mild irregularity of the coronary anatomy.  Left ventricular function is normal (LVEF=55%).                         8.4    7.8   )-----------( 392      ( 26 Mar 2018 05:52 )             26.8     03-26    130<L>  |  91<L>  |  21.0<H>  ----------------------------<  201<H>  4.2   |  25.0  |  4.04<H>    Ca    8.4<L>      26 Mar 2018 05:52  Phos  1.6     03-26  Mg     1.6     03-26    TPro  6.1<L>  /  Alb  2.4<L>  /  TBili  0.4  /  DBili  x   /  AST  42<H>  /  ALT  29  /  AlkPhos  176<H>  03-26

## 2018-03-27 ENCOUNTER — TRANSCRIPTION ENCOUNTER (OUTPATIENT)
Age: 58
End: 2018-03-27

## 2018-03-27 LAB
ANION GAP SERPL CALC-SCNC: 13 MMOL/L — SIGNIFICANT CHANGE UP (ref 5–17)
AUTO DIFF PNL BLD: NEGATIVE — SIGNIFICANT CHANGE UP
BASOPHILS # BLD AUTO: 0 K/UL — SIGNIFICANT CHANGE UP (ref 0–0.2)
BASOPHILS NFR BLD AUTO: 0.3 % — SIGNIFICANT CHANGE UP (ref 0–2)
BUN SERPL-MCNC: 18 MG/DL — SIGNIFICANT CHANGE UP (ref 8–20)
C-ANCA SER-ACNC: NEGATIVE — SIGNIFICANT CHANGE UP
CALCIUM SERPL-MCNC: 7.9 MG/DL — LOW (ref 8.6–10.2)
CHLORIDE SERPL-SCNC: 94 MMOL/L — LOW (ref 98–107)
CO2 SERPL-SCNC: 25 MMOL/L — SIGNIFICANT CHANGE UP (ref 22–29)
CREAT SERPL-MCNC: 3.62 MG/DL — HIGH (ref 0.5–1.3)
EOSINOPHIL # BLD AUTO: 0.2 K/UL — SIGNIFICANT CHANGE UP (ref 0–0.5)
EOSINOPHIL NFR BLD AUTO: 3 % — SIGNIFICANT CHANGE UP (ref 0–6)
GLUCOSE BLDC GLUCOMTR-MCNC: 156 MG/DL — HIGH (ref 70–99)
GLUCOSE BLDC GLUCOMTR-MCNC: 158 MG/DL — HIGH (ref 70–99)
GLUCOSE BLDC GLUCOMTR-MCNC: 174 MG/DL — HIGH (ref 70–99)
GLUCOSE BLDC GLUCOMTR-MCNC: 224 MG/DL — HIGH (ref 70–99)
GLUCOSE SERPL-MCNC: 171 MG/DL — HIGH (ref 70–115)
HCT VFR BLD CALC: 24.1 % — LOW (ref 37–47)
HGB BLD-MCNC: 7.7 G/DL — LOW (ref 12–16)
LYMPHOCYTES # BLD AUTO: 0.9 K/UL — LOW (ref 1–4.8)
LYMPHOCYTES # BLD AUTO: 13.8 % — LOW (ref 20–55)
MCHC RBC-ENTMCNC: 28.1 PG — SIGNIFICANT CHANGE UP (ref 27–31)
MCHC RBC-ENTMCNC: 32 G/DL — SIGNIFICANT CHANGE UP (ref 32–36)
MCV RBC AUTO: 88 FL — SIGNIFICANT CHANGE UP (ref 81–99)
MONOCYTES # BLD AUTO: 0.9 K/UL — HIGH (ref 0–0.8)
MONOCYTES NFR BLD AUTO: 13.6 % — HIGH (ref 3–10)
NEUTROPHILS # BLD AUTO: 4.6 K/UL — SIGNIFICANT CHANGE UP (ref 1.8–8)
NEUTROPHILS NFR BLD AUTO: 69 % — SIGNIFICANT CHANGE UP (ref 37–73)
NON-GYN CYTOLOGY SPEC: SIGNIFICANT CHANGE UP
O2 CT VFR BLD CALC: ABNORMAL
P-ANCA SER-ACNC: POSITIVE
PLATELET # BLD AUTO: 396 K/UL — SIGNIFICANT CHANGE UP (ref 150–400)
POTASSIUM SERPL-MCNC: 3.7 MMOL/L — SIGNIFICANT CHANGE UP (ref 3.5–5.3)
POTASSIUM SERPL-SCNC: 3.7 MMOL/L — SIGNIFICANT CHANGE UP (ref 3.5–5.3)
RBC # BLD: 2.74 M/UL — LOW (ref 4.4–5.2)
RBC # FLD: 14 % — SIGNIFICANT CHANGE UP (ref 11–15.6)
SODIUM SERPL-SCNC: 132 MMOL/L — LOW (ref 135–145)
WBC # BLD: 6.7 K/UL — SIGNIFICANT CHANGE UP (ref 4.8–10.8)
WBC # FLD AUTO: 6.7 K/UL — SIGNIFICANT CHANGE UP (ref 4.8–10.8)

## 2018-03-27 PROCEDURE — 99233 SBSQ HOSP IP/OBS HIGH 50: CPT | Mod: GC

## 2018-03-27 PROCEDURE — 99233 SBSQ HOSP IP/OBS HIGH 50: CPT

## 2018-03-27 RX ORDER — SACCHAROMYCES BOULARDII 250 MG
250 POWDER IN PACKET (EA) ORAL
Qty: 0 | Refills: 0 | Status: DISCONTINUED | OUTPATIENT
Start: 2018-03-27 | End: 2018-03-27

## 2018-03-27 RX ORDER — SENNA PLUS 8.6 MG/1
2 TABLET ORAL AT BEDTIME
Qty: 0 | Refills: 0 | Status: DISCONTINUED | OUTPATIENT
Start: 2018-03-27 | End: 2018-03-28

## 2018-03-27 RX ORDER — DOCUSATE SODIUM 100 MG
100 CAPSULE ORAL DAILY
Qty: 0 | Refills: 0 | Status: DISCONTINUED | OUTPATIENT
Start: 2018-03-27 | End: 2018-03-28

## 2018-03-27 RX ORDER — CEFAZOLIN SODIUM 1 G
2000 VIAL (EA) INJECTION ONCE
Qty: 0 | Refills: 0 | Status: DISCONTINUED | OUTPATIENT
Start: 2018-03-27 | End: 2018-03-28

## 2018-03-27 RX ORDER — HEPARIN SODIUM 5000 [USP'U]/ML
5000 INJECTION INTRAVENOUS; SUBCUTANEOUS EVERY 12 HOURS
Qty: 0 | Refills: 0 | Status: DISCONTINUED | OUTPATIENT
Start: 2018-03-27 | End: 2018-03-28

## 2018-03-27 RX ADMIN — Medication 10 MILLIGRAM(S): at 05:08

## 2018-03-27 RX ADMIN — Medication 0.2 MILLIGRAM(S): at 17:43

## 2018-03-27 RX ADMIN — Medication 0.6 MILLIGRAM(S): at 17:42

## 2018-03-27 RX ADMIN — Medication 1 DROP(S): at 17:40

## 2018-03-27 RX ADMIN — Medication 667 MILLIGRAM(S): at 16:48

## 2018-03-27 RX ADMIN — Medication 50 MILLIGRAM(S): at 05:08

## 2018-03-27 RX ADMIN — CARVEDILOL PHOSPHATE 12.5 MILLIGRAM(S): 80 CAPSULE, EXTENDED RELEASE ORAL at 17:42

## 2018-03-27 RX ADMIN — Medication 1 DROP(S): at 05:07

## 2018-03-27 RX ADMIN — HEPARIN SODIUM 5000 UNIT(S): 5000 INJECTION INTRAVENOUS; SUBCUTANEOUS at 17:53

## 2018-03-27 RX ADMIN — Medication 650 MILLIGRAM(S): at 04:36

## 2018-03-27 RX ADMIN — Medication 10 MILLIGRAM(S): at 19:39

## 2018-03-27 RX ADMIN — PANTOPRAZOLE SODIUM 40 MILLIGRAM(S): 20 TABLET, DELAYED RELEASE ORAL at 17:53

## 2018-03-27 RX ADMIN — Medication 50 MILLIGRAM(S): at 22:13

## 2018-03-27 RX ADMIN — Medication 81 MILLIGRAM(S): at 13:52

## 2018-03-27 RX ADMIN — SENNA PLUS 2 TABLET(S): 8.6 TABLET ORAL at 22:14

## 2018-03-27 RX ADMIN — PANTOPRAZOLE SODIUM 40 MILLIGRAM(S): 20 TABLET, DELAYED RELEASE ORAL at 05:08

## 2018-03-27 RX ADMIN — ERYTHROPOIETIN 10000 UNIT(S): 10000 INJECTION, SOLUTION INTRAVENOUS; SUBCUTANEOUS at 09:32

## 2018-03-27 RX ADMIN — CARVEDILOL PHOSPHATE 12.5 MILLIGRAM(S): 80 CAPSULE, EXTENDED RELEASE ORAL at 05:08

## 2018-03-27 RX ADMIN — Medication 100 MILLIGRAM(S): at 22:14

## 2018-03-27 RX ADMIN — Medication 0.6 MILLIGRAM(S): at 05:08

## 2018-03-27 RX ADMIN — Medication 50 MILLIGRAM(S): at 15:40

## 2018-03-27 RX ADMIN — Medication 0.1 MILLIGRAM(S): at 05:08

## 2018-03-27 RX ADMIN — Medication 2: at 16:47

## 2018-03-27 RX ADMIN — Medication 667 MILLIGRAM(S): at 13:52

## 2018-03-27 RX ADMIN — Medication 1: at 08:44

## 2018-03-27 RX ADMIN — Medication 1: at 12:07

## 2018-03-27 RX ADMIN — SIMVASTATIN 40 MILLIGRAM(S): 20 TABLET, FILM COATED ORAL at 22:13

## 2018-03-27 RX ADMIN — Medication 650 MILLIGRAM(S): at 05:36

## 2018-03-27 NOTE — PROGRESS NOTE ADULT - PROBLEM SELECTOR PLAN 1
secondary to Uremic Pericarditis now improved   clinically improved after regular HD, received (3/26) and going today. 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 Per cardiology, Needs total 4 weeks and then stop - stop date will be 4/12  Fluid analysis consistent with transudate from effusions likely Anemia of chronic disease due to worsening acute on chronic renal insufficiency in the setting of CKD,  and concomitant  iron deficiency   -s/p 2 units pRBC on admission with improvement in h/h , another unit pRBC 3/23  -occult stool neg  -c/w ppi po bid   -GI consulted, deferred EGD tentatively on 03/29/2018. Cleared by Cardio on 03/25/2018  continue EPOGEN with HD and ferrous sulfate  Keep Hgb over 8 -will transfuse one unit today

## 2018-03-27 NOTE — PROGRESS NOTE ADULT - PROBLEM SELECTOR PLAN 8
likely 2/2 stool softeners. d/jonatan all stool softeners and observe  Today improved, patient mentions no diarrhea. No chemical ac given severe anemia   SCDs while in bed  Ambulate with assistance

## 2018-03-27 NOTE — PROGRESS NOTE ADULT - PROBLEM SELECTOR PLAN 3
likely 2/2 superficial thrombus  Us of right arm reported superficial basilic vein distal thrombus but no DVT, pseudoaneurysm or hematoma  elevation and compresses

## 2018-03-27 NOTE — PROGRESS NOTE ADULT - SUBJECTIVE AND OBJECTIVE BOX
Patient is a 57y old  Female who presents with a chief complaint of Nausea/vomiting and Chest pain (16 Mar 2018 17:20)    INTERVAL HPI/OVERNIGHT EVENTS:  Patient is a 58 y/o Female seen and examined at bedside. Patient noted to have right arm painful bruising 2 days ago, now stable-decreasing, ipsilateral to catheterization from Friday. Patient has been doing well but she did't sleep because she was uncomfortable on the bed, no constipation/diarrhea patient denies. LBM: 2018. HD schedule as per renal. Patient went yesterday and is going today    ROS:  Patient denying CP, SOB, palpitations, leg swelling, lightheadedness/dizziness, paresthesias. All other ROS negative       Vital Signs Last 24 Hrs  T(C): 37.2 (27 Mar 2018 07:30), Max: 37.2 (27 Mar 2018 07:30)  T(F): 99 (27 Mar 2018 07:30), Max: 99 (27 Mar 2018 07:30)  HR: 77 (27 Mar 2018 07:30) (72 - 89)  BP: 170/91 (27 Mar 2018 07:30) (143/72 - 177/96)  RR: 18 (27 Mar 2018 07:30) (16 - 18)  SpO2: 94% (27 Mar 2018 07:30) (92% - 99%)    POCT  Blood Glucose (18 @ 08:20)    POCT Blood Glucose.: 156 mg/dL        PHYSICAL EXAM:    GENERAL: NAD, well-groomed, well-developed, sleepy  HEENT: eomi, perrla, MMM  CHEST/LUNG: fair b/l ae. CTA BL no wheezing, crackles  HEART: S1S2nl, no r/g noted now. + 3/6 SM. No pericardial rub noted   ABDOMEN: Soft, Nontender, Nondistended; Bowel sounds present  EXTREMITIES:  2+ Peripheral Pulses, No clubbing, cyanosis, or edema in b/l LE. RUE with swelling to fingers with medial upper arm ecchymosis, decreased in size as yesterday and fading. Pulses in right arm palpable with normal temp  Skin: right sided PC in place (neck)  NERVOUS SYSTEM:  Alert & Oriented X3, Good concentration; Motor Strength 5/5 B/L upper and lower extremities; DTRs 2+ intact and symmetric    LABS: pending morning labs                                       8.4    7.8   )-----------( 392      ( 26 Mar 2018 05:52 )             26.8           130<L>  |  91<L>  |  21.0<H>  ----------------------------<  201<H>  4.2   |  25.0  |  4.04<H>    Ca    8.4<L>      26 Mar 2018 05:52  Phos  1.6       Mg     1.6         TPro  6.1<L>  /  Alb  2.4<L>  /  TBili  0.4  /  DBili  x   /  AST  42<H>  /  ALT  29  /  AlkPhos  176<H>            Hemoglobin A1C, Whole Blood: 7.5 % ( @ 11:12)    Culture Results:   No growth at 2 days.  Culture in progress ( @ 11:30)  Culture Results:   No growth at 3 days.  Culture in progress ( @ 12:48)  Culture Results:   <10,000 CFU/ml Gram Positive Cocci in Clusters  <10,000 CFU/ml Gram Positive Rods ( @ 21:37)  Culture Results:   No growth at 5 days. ( @ 22:13)  Culture Results:   No growth at 5 days. ( @ 22:12)    RADIOLOGY & ADDITIONAL TESTS:       EXAM:  US DPLX [employer] RT                        PROCEDURE DATE:  2018    INTERPRETATION:  HISTORY: Right radial artery catheterization. Pain.   Pseudoaneurysm.  TECHNIQUE: Grey-scale and color-flow Doppler imaging   FINDINGS: No evidence of hematoma or pseudoaneurysm seen. Right radial   and ulnar arteries are patent.  IMPRESSION: No evidence of pseudoaneurysm  NISHA ZAVALA M.D., ATTENDING RADIOLOGIST  This document has been electronically signed. Mar 25 2018  4:24PM      EXAM:  US JOINT NONVASC EXT LTD RT                        PROCEDURE DATE:  2018    INTERPRETATION:  Clinical information: Worsening pain at site of IV   infiltration in medial aspect of right arm  Sonography of the right upper extremity was performed at the area of   clinical concern.  COMPARISON: None  FINDINGS/  IMPRESSION: No mass, hematoma or fluid collection is identified.  SYLVIE DAILEY M.D., ATTENDING RADIOLOGIST  This document has been electronically signed. Mar 25 2018  4:22PM           EXAM:  US DPLX UPR EXT VEINS LTD RT                        PROCEDURE DATE:  2018    INTERPRETATION:  Right upper extremity venous Doppler ultrasound   COMPARISON: None.  CLINICAL INFORMATION: Swelling, pain.  FINDINGS:  Right subclavian vein has limited visualization due to catheter placement  The right internal jugular, axillary, brachial, radial and ulnar veins show normal flow and are compressible where feasible, without evidence of DVT.  The cephalic vein is patent and compressible.  The basilic vein contains a focal thrombus within the mid forearm distally .  IMPRESSION:  Superficial basilic vein distal thrombus as described.  No evidence of upper extremity DVT. Incomplete evaluation of subclavian vein due to indwelling catheter.  NISHA ZAVALA M.D., ATTENDING RADIOLOGIST  This document has been electronically signed. Mar 25 2018  4:21PM          < from: Xray Chest 1 View AP/PA. (18 @ 11:51) >  IMPRESSION:    Support Devices: None  Heart: Cardiomegaly  Mediastinum:  Unremarkable  Lungs/Airways: Decreased left pleural effusion. No significant   pneumothorax. Multiple moderate right pleural effusion.  Bones/Soft tissues: Unremarkable        [facility]  Department of Cardiology  [address] [place] [address]  [phone]  Cath Lab Report -- Comprehensive Report  Patient: JAROCHO MORALES  Study date: 2018  Account number: [id number]  MR number: [id number]  : 1960  Gender: Female  Race: O  Case Physician(s):  Jesús Godinez MD  Referring Physician:  INDICATIONS: Acute diastolic congestive heart failure.  HISTORY: 58 yo woman with ESKD now on dialysis, admitted with worsening  SOB, orthopnea and anemia. She had bilateral pleural effusion and moderate  pericardial effusion. There was no prior cardiac history. The patient has  hypertension and dialysis-treated renal failure.  PROCEDURE:  --  Left heart catheterization with ventriculography.  --  Left coronary angiography.  --  Right coronary angiography.  TECHNIQUE: The risks and alternatives of the procedures and conscious  sedation were explained to the patient and informed consent was obtained.  Cardiac catheterization performed electively.  Local anesthetic given. Right radial artery access. Left heart  catheterization. Ventriculography was performed. Left coronary artery  angiography. The vessel was injected utilizing a catheter. Right coronary  artery angiography. The vessel was injected utilizing a catheter.  RADIATION EXPOSURE: 3 min.  CONTRAST GIVEN: Omnipaque 60 ml.  MEDICATIONS GIVEN: Verapamil (Isoptin, Calan, Covera), 5 mg, IA. Heparin,  3000 units, IA. 1% Lidocaine, 10 ml, subcutaneously.  VENTRICLES: There were no left ventricular global or regional wall motion  abnormalities. Global left ventricular function was normal. EF calculated  by contrast ventriculography was 55 %.  VALVES: AORTIC VALVE: No significant aortic valve gradient. MITRAL VALVE:  The mitral valve exhibited trivial regurgitation (less than 1+).  CORONARY VESSELS: The coronary circulation is right dominant.  LM:   --  LM: Normal. The vessel was normal sized, not calcified, and not  tortuous. Angiography showed no evidence of disease.  LAD:   --  LAD: Normal. The vessel was normal sized, not calcified, and not  tortuous. Angiography showed minor luminal irregularities with no flow  limiting lesions.  CX:   --  Circumflex: Normal. The vessel was normal sized, not calcified,  and excessively tortuous.Angiography showed no evidence of disease.  RCA:   --  RCA: Normal. The vessel was normal sized, not calcified, and  moderately tortuous. Angiography showed no evidence of disease.  COMPLICATIONS: There were no complications. No complications occurred  during the cath lab visit.  DIAGNOSTIC IMPRESSIONS: There is mild irregularity of the coronary anatomy.  Left ventricular function is normal (LVEF=55%).  DIAGNOSTIC RECOMMENDATIONS: The patient should continue with the present  medications. Patientmanagement should include aggressive medical therapy  and resumption of all previous activities in 2 days.  Prepared and signed by  Jesús Godinez MD  Signed 2018 14:30:17  HEMODYNAMIC TABLES  Pressures:  Baseline  Pressures:  - HR: 68  Pressures:  - Rhythm:  Pressures:  -- Aortic Pressure (S/D/M): 164/83/117  Pressures:  -- Left Ventricle (s/edp): 166/24/--  Outputs:  Baseline  Outputs:  -- CALCULATIONS[age]  Outputs:  -- CALCULATIONS: Body Surface Area: 1.68  Outputs:  -- CALCULATIONS: Height in cm: 157.00  Outputs:  -- CALCULATIONS: Sex: Female  Outputs:  -- CALCULATIONS: Weight in k.60      MEDICATIONS  (STANDING):  artificial  tears Solution 1 Drop(s) Both EYES every 12 hours  aspirin enteric coated 81 milliGRAM(s) Oral daily  calcium acetate 667 milliGRAM(s) Oral three times a day with meals  carvedilol 12.5 milliGRAM(s) Oral every 12 hours  ceFAZolin   IVPB 2000 milliGRAM(s) IV Intermittent once  cloNIDine 0.2 milliGRAM(s) Oral two times a day  colchicine 0.6 milliGRAM(s) Oral two times a day  dextrose 5%. 1000 milliLiter(s) (50 mL/Hr) IV Continuous <Continuous>  dextrose 50% Injectable 12.5 Gram(s) IV Push once  dextrose 50% Injectable 25 Gram(s) IV Push once  dextrose 50% Injectable 25 Gram(s) IV Push once  epoetin gian Injectable 72813 Unit(s) IV Push <User Schedule>  ergocalciferol 24148 Unit(s) Oral every week  hydrALAZINE 50 milliGRAM(s) Oral every 8 hours  insulin lispro (HumaLOG) corrective regimen sliding scale   SubCutaneous three times a day before meals  insulin lispro (HumaLOG) corrective regimen sliding scale   SubCutaneous at bedtime  metoclopramide Injectable 10 milliGRAM(s) IV Push every 12 hours  pantoprazole    Tablet 40 milliGRAM(s) Oral two times a day  simvastatin 40 milliGRAM(s) Oral at bedtime    MEDICATIONS  (PRN):  acetaminophen   Tablet 650 milliGRAM(s) Oral every 6 hours PRN For Temp greater than 38 C (100.4 F)  acetaminophen   Tablet. 650 milliGRAM(s) Oral every 6 hours PRN Moderate Pain (4 - 6)  dextrose Gel 1 Dose(s) Oral once PRN Blood Glucose LESS THAN 70 milliGRAM(s)/deciliter  glucagon  Injectable 1 milliGRAM(s) IntraMuscular once PRN Glucose LESS THAN 70 milligrams/deciliter  hydrALAZINE Injectable 20 milliGRAM(s) IV Push every 6 hours PRN SBP > 160  ondansetron Injectable 4 milliGRAM(s) IV Push every 6 hours PRN Nausea Patient is a 57y old  Female who presents with a chief complaint of Nausea/vomiting and Chest pain (16 Mar 2018 17:20)    INTERVAL HPI/OVERNIGHT EVENTS:  Patient is a 56 y/o Female seen and examined at bedside. Patient noted to have right arm painful bruising 2 days ago, now stable-decreasing, ipsilateral to catheterization from Friday. Patient has been doing well but she did't sleep because she was uncomfortable on the bed, no constipation/diarrhea patient denies. LBM: 03/26/2018. HD schedule as per renal. Patient went yesterday and is going today    ROS:  Patient denying CP, SOB, palpitations, leg swelling, lightheadedness/dizziness, paresthesias. All other ROS negative     Vital Signs Last 24 Hrs  T(C): 37.2 (27 Mar 2018 07:30), Max: 37.2 (27 Mar 2018 07:30)  T(F): 99 (27 Mar 2018 07:30), Max: 99 (27 Mar 2018 07:30)  HR: 77 (27 Mar 2018 07:30) (72 - 89)  BP: 170/91 (27 Mar 2018 07:30) (143/72 - 177/96)  RR: 18 (27 Mar 2018 07:30) (16 - 18)  SpO2: 94% (27 Mar 2018 07:30) (92% - 99%)    POCT  Blood Glucose (03.27.18 @ 08:20)    POCT Blood Glucose.: 156 mg/dL    PHYSICAL EXAM:    GENERAL: NAD, well-groomed, well-developed, sleepy  HEENT: eomi, perrla, MMM  CHEST/LUNG: fair b/l ae. CTA BL no wheezing, no crackles noted  HEART: S1S2nl, no r/g noted now. + 3/6 SM. No pericardial rub noted   ABDOMEN: Soft, Nontender, Nondistended; Bowel sounds present  EXTREMITIES:  2+ Peripheral Pulses, No clubbing, cyanosis, or edema in b/l LE. RUE with swelling to fingers with medial upper arm ecchymosis, decreased in size as yesterday and fading. Pulses in right arm palpable with normal temp  Skin: right sided PC in place (neck)  NERVOUS SYSTEM:  Alert & Oriented X3, Good concentration; Motor Strength 5/5 B/L upper and lower extremities; DTRs 2+ intact and symmetric    LABS:                         7.7    6.7   )-----------( 396      ( 27 Mar 2018 08:26 )             24.1   03-27    132<L>  |  94<L>  |  18.0  ----------------------------<  171<H>  3.7   |  25.0  |  3.62<H>    Ca    7.9<L>      27 Mar 2018 08:26  Phos  1.6     03-26  Mg     1.6     03-26    TPro  6.1<L>  /  Alb  2.4<L>  /  TBili  0.4  /  DBili  x   /  AST  42<H>  /  ALT  29  /  AlkPhos  176<H>  03-26        Hemoglobin A1C, Whole Blood: 7.5 % (03-14 @ 11:12)    Culture Results:   No growth at 2 days.  Culture in progress (03-21 @ 11:30)  Culture Results:   No growth at 3 days.  Culture in progress (03-20 @ 12:48)  Culture Results:   <10,000 CFU/ml Gram Positive Cocci in Clusters  <10,000 CFU/ml Gram Positive Rods (03-17 @ 21:37)  Culture Results:   No growth at 5 days. (03-13 @ 22:13)  Culture Results:   No growth at 5 days. (03-13 @ 22:12)    RADIOLOGY & ADDITIONAL TESTS:       EXAM:  US DPLX [place] RT                        PROCEDURE DATE:  03/25/2018    INTERPRETATION:  HISTORY: Right radial artery catheterization. Pain.   Pseudoaneurysm.  TECHNIQUE: Grey-scale and color-flow Doppler imaging   FINDINGS: No evidence of hematoma or pseudoaneurysm seen. Right radial   and ulnar arteries are patent.  IMPRESSION: No evidence of pseudoaneurysm  NISHA ZAVALA M.D., ATTENDING RADIOLOGIST  This document has been electronically signed. Mar 25 2018  4:24PM      EXAM:  US JOINT NONVASC EXT LTD RT                        PROCEDURE DATE:  03/25/2018    INTERPRETATION:  Clinical information: Worsening pain at site of IV   infiltration in medial aspect of right arm  Sonography of the right upper extremity was performed at the area of   clinical concern.  COMPARISON: None  FINDINGS/  IMPRESSION: No mass, hematoma or fluid collection is identified.  SYLVIE DAILEY M.D., ATTENDING RADIOLOGIST  This document has been electronically signed. Mar 25 2018  4:22PM           EXAM:  US DPLX UPR EXT VEINS LTD RT                        PROCEDURE DATE:  03/25/2018    INTERPRETATION:  Right upper extremity venous Doppler ultrasound   COMPARISON: None.  CLINICAL INFORMATION: Swelling, pain.  FINDINGS:  Right subclavian vein has limited visualization due to catheter placement  The right internal jugular, axillary, brachial, radial and ulnar veins show normal flow and are compressible where feasible, without evidence of DVT.  The cephalic vein is patent and compressible.  The basilic vein contains a focal thrombus within the mid forearm distally .  IMPRESSION:  Superficial basilic vein distal thrombus as described.  No evidence of upper extremity DVT. Incomplete evaluation of subclavian vein due to indwelling catheter.  NISHA ZAVALA M.D., ATTENDING RADIOLOGIST  This document has been electronically signed. Mar 25 2018  4:21PM      Referring Physician:  INDICATIONS: Acute diastolic congestive heart failure.  HISTORY: 58 yo woman with ESKD now on dialysis, admitted with worsening  SOB, orthopnea and anemia. She had bilateral pleural effusion and moderate  pericardial effusion. There was no prior cardiac history. The patient has  hypertension and dialysis-treated renal failure.  PROCEDURE:  --  Left heart catheterization with ventriculography.  --  Left coronary angiography.  --  Right coronary angiography.  TECHNIQUE: The risks and alternatives of the procedures and conscious  sedation were explained to the patient and informed consent was obtained.  Cardiac catheterization performed electively.  Local anesthetic given. Right radial artery access. Left heart  catheterization. Ventriculography was performed. Left coronary artery  angiography. The vessel was injected utilizing a catheter. Right coronary  artery angiography. The vessel was injected utilizing a catheter.  RADIATION EXPOSURE: 3 min.  CONTRAST GIVEN: Omnipaque 60 ml.  MEDICATIONS GIVEN: Verapamil (Isoptin, Calan, Covera), 5 mg, IA. Heparin,  3000 units, IA. 1% Lidocaine, 10 ml, subcutaneously.  VENTRICLES: There were no left ventricular global or regional wall motion  abnormalities. Global left ventricular function was normal. EF calculated  by contrast ventriculography was 55 %.  VALVES: AORTIC VALVE: No significant aortic valve gradient. MITRAL VALVE:  The mitral valve exhibited trivial regurgitation (less than 1+).  CORONARY VESSELS: The coronary circulation is right dominant.  LM:   --  LM: Normal. The vessel was normal sized, not calcified, and not  tortuous. Angiography showed no evidence of disease.  LAD:   --  LAD: Normal. The vessel was normal sized, not calcified, and not  tortuous. Angiography showed minor luminal irregularities with no flow  limiting lesions.  CX:   --  Circumflex: Normal. The vessel was normal sized, not calcified,  and excessively tortuous.Angiography showed no evidence of disease.  RCA:   --  RCA: Normal. The vessel was normal sized, not calcified, and  moderately tortuous. Angiography showed no evidence of disease.  COMPLICATIONS: There were no complications. No complications occurred  during the cath lab visit.  DIAGNOSTIC IMPRESSIONS: There is mild irregularity of the coronary anatomy.  Left ventricular function is normal (LVEF=55%).  DIAGNOSTIC RECOMMENDATIONS: The patient should continue with the present  medications. Patientmanagement should include aggressive medical therapy  and resumption of all previous activities in 2 days.      MEDICATIONS  (STANDING):  artificial  tears Solution 1 Drop(s) Both EYES every 12 hours  aspirin enteric coated 81 milliGRAM(s) Oral daily  calcium acetate 667 milliGRAM(s) Oral three times a day with meals  carvedilol 12.5 milliGRAM(s) Oral every 12 hours  ceFAZolin   IVPB 2000 milliGRAM(s) IV Intermittent once  cloNIDine 0.2 milliGRAM(s) Oral two times a day  colchicine 0.6 milliGRAM(s) Oral two times a day  dextrose 5%. 1000 milliLiter(s) (50 mL/Hr) IV Continuous <Continuous>  dextrose 50% Injectable 12.5 Gram(s) IV Push once  dextrose 50% Injectable 25 Gram(s) IV Push once  dextrose 50% Injectable 25 Gram(s) IV Push once  epoetin gian Injectable 71292 Unit(s) IV Push <User Schedule>  ergocalciferol 41560 Unit(s) Oral every week  hydrALAZINE 50 milliGRAM(s) Oral every 8 hours  insulin lispro (HumaLOG) corrective regimen sliding scale   SubCutaneous three times a day before meals  insulin lispro (HumaLOG) corrective regimen sliding scale   SubCutaneous at bedtime  metoclopramide Injectable 10 milliGRAM(s) IV Push every 12 hours  pantoprazole    Tablet 40 milliGRAM(s) Oral two times a day  simvastatin 40 milliGRAM(s) Oral at bedtime    MEDICATIONS  (PRN):  acetaminophen   Tablet 650 milliGRAM(s) Oral every 6 hours PRN For Temp greater than 38 C (100.4 F)  acetaminophen   Tablet. 650 milliGRAM(s) Oral every 6 hours PRN Moderate Pain (4 - 6)  dextrose Gel 1 Dose(s) Oral once PRN Blood Glucose LESS THAN 70 milliGRAM(s)/deciliter  glucagon  Injectable 1 milliGRAM(s) IntraMuscular once PRN Glucose LESS THAN 70 milligrams/deciliter  hydrALAZINE Injectable 20 milliGRAM(s) IV Push every 6 hours PRN SBP > 160  ondansetron Injectable 4 milliGRAM(s) IV Push every 6 hours PRN Nausea

## 2018-03-27 NOTE — PROGRESS NOTE ADULT - ASSESSMENT
57 y.o. F with hx of HTN, HLD, DMII, hyperkalemia, ckd 3 who presented with 3 weeks of postprandial vomiting and abd pain, noted to have ARF, fluid overload with bilateral pleural effusions and pericardial effusion as well as symptomatic anemia likely multifactorial secondary to acute on chronic renal failure and suspected upper GI Bleeding from vomiting/retching and NSAID use. Clinical concern that worsening acute on chronic renal failure possibly caused uremia related sx of N/V, with patients NSAID use and retching suspected GI bleed but occult blood returned negative, for dropping h/h pt received 2 u prbc on admission with appropriate response to therapy. 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 In the interim while being dialyzed the patient had an episode of PAF, s/p cardizem became rate controlled and remained in NSR. EP consulted. Unable to AC the patient due to initial concern for GI bleeding, for which GI consulted and tentatively GI intervention on 03/29/2018, cleared by cardio on 03/25/218. Clinical concern for ARF causing volume overload, unclear if HFPEF is a contributing factor. 2 days ago presented right arm ecchymosis on the cath side. Us of right arm reported superficial basilic vein distal thrombus but no didn't report DVT, pseudoaneurysm or hematoma    S/p R thoracocentesis on 3/20 with 1100 cc removed  S/P pleurocentesis done 3/21 on left sided with removal of 1 L. Fluid analysis consistent with transudate   s/p cath on 03/24/2018, NEG, EF 55%  tentatively GI intervention (EGD) on 03/29/2018  cleared by cardio on 03/25/218   and A/V fistula on 03/28/2018 as per vascular surgery 57 y.o. F with hx of HTN, HLD, DMII, hyperkalemia, ckd 3 who presented with 3 weeks of postprandial vomiting and abd pain, noted to have ARF, fluid overload with bilateral pleural effusions and pericardial effusion as well as symptomatic anemia likely multifactorial secondary to acute on chronic renal failure and suspected upper GI Bleeding from vomiting/retching and NSAID use. Clinical concern that worsening acute on chronic renal failure possibly caused uremia related sx of N/V, with patients NSAID use and retching suspected GI bleed but occult blood returned negative, for dropping h/h pt received 2 u prbc on admission with appropriate response to therapy. 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 In the interim while being dialyzed the patient had an episode of PAF, s/p cardizem became rate controlled and remained in NSR. EP consulted. Unable to AC the patient due to initial concern for GI bleeding, for which GI consulted and tentatively GI intervention on 03/29/2018, cleared by cardio on 03/25/218. 2 days ago presented right arm ecchymosis on the cath side. Us of right arm reported superficial basilic vein distal thrombus but no didn't report DVT, pseudoaneurysm or hematoma    S/p R thoracocentesis on 3/20 with 1100 cc removed  S/P pleurocentesis done 3/21 on left sided with removal of 1 L. Fluid analysis consistent with transudate   s/p cath on 03/24/2018, NEG, EF 55%  tentatively GI intervention (EGD) on 03/29/2018  cleared by cardio on 03/25/218   and A/V fistula on 03/28/2018 as per vascular surgery

## 2018-03-27 NOTE — PROGRESS NOTE ADULT - SUBJECTIVE AND OBJECTIVE BOX
PRE OPERATIVE NOTE    Pre-op Diagnosis: ESRD on HD  Procedure: L AVF creation ; possible AVG  Surgeon: Dr Marsh    PAST MEDICAL & SURGICAL HISTORY:  HLD (hyperlipidemia)  HTN (hypertension)  DM (diabetes mellitus)  No significant past surgical history    Allergies  No Known Allergies        Daily Weight in k.5 (26 Mar 2018 16:50)    Vital Signs Last 24 Hrs  T(C): 36.8 (27 Mar 2018 05:05), Max: 36.8 (27 Mar 2018 05:05)  T(F): 98.2 (27 Mar 2018 05:05), Max: 98.2 (27 Mar 2018 05:05)  HR: 74 (27 Mar 2018 05:05) (72 - 89)  BP: 156/82 (27 Mar 2018 05:05) (143/72 - 177/96)  BP(mean): --  RR: 18 (27 Mar 2018 05:05) (16 - 18)  SpO2: 94% (27 Mar 2018 05:05) (92% - 99%)    MEDICATIONS  (STANDING):  artificial  tears Solution 1 Drop(s) Both EYES every 12 hours  aspirin enteric coated 81 milliGRAM(s) Oral daily  calcium acetate 667 milliGRAM(s) Oral three times a day with meals  carvedilol 12.5 milliGRAM(s) Oral every 12 hours  cloNIDine 0.1 milliGRAM(s) Oral two times a day  colchicine 0.6 milliGRAM(s) Oral two times a day  dextrose 5%. 1000 milliLiter(s) (50 mL/Hr) IV Continuous <Continuous>  dextrose 50% Injectable 12.5 Gram(s) IV Push once  dextrose 50% Injectable 25 Gram(s) IV Push once  dextrose 50% Injectable 25 Gram(s) IV Push once  epoetin gian Injectable 25635 Unit(s) IV Push <User Schedule>  ergocalciferol 24113 Unit(s) Oral every week  hydrALAZINE 50 milliGRAM(s) Oral every 8 hours  insulin lispro (HumaLOG) corrective regimen sliding scale   SubCutaneous three times a day before meals  insulin lispro (HumaLOG) corrective regimen sliding scale   SubCutaneous at bedtime  metoclopramide Injectable 10 milliGRAM(s) IV Push every 12 hours  pantoprazole    Tablet 40 milliGRAM(s) Oral two times a day  simvastatin 40 milliGRAM(s) Oral at bedtime    MEDICATIONS  (PRN):  acetaminophen   Tablet 650 milliGRAM(s) Oral every 6 hours PRN For Temp greater than 38 C (100.4 F)  acetaminophen   Tablet. 650 milliGRAM(s) Oral every 6 hours PRN Moderate Pain (4 - 6)  dextrose Gel 1 Dose(s) Oral once PRN Blood Glucose LESS THAN 70 milliGRAM(s)/deciliter  glucagon  Injectable 1 milliGRAM(s) IntraMuscular once PRN Glucose LESS THAN 70 milligrams/deciliter  hydrALAZINE Injectable 20 milliGRAM(s) IV Push every 6 hours PRN SBP > 160  ondansetron Injectable 4 milliGRAM(s) IV Push every 6 hours PRN Nausea                            8.4    7.8   )-----------( 392      ( 26 Mar 2018 05:52 )             26.8     -    130<L>  |  91<L>  |  21.0<H>  ----------------------------<  201<H>  4.2   |  25.0  |  4.04<H>    Ca    8.4<L>      26 Mar 2018 05:52  Phos  1.6       Mg     1.6         TPro  6.1<L>  /  Alb  2.4<L>  /  TBili  0.4  /  DBili  x   /  AST  42<H>  /  ALT  29  /  AlkPhos  176<H>        CAPILLARY BLOOD GLUCOSE  POCT Blood Glucose.: 162 mg/dL (26 Mar 2018 22:23)    Type & Screen: on chart  CXR: on chart  EKG: on chart    A/P: 57y Female planned for above procedure  Consent to be obtained by MD in periop holding area  NPO past midnight, except medications  carvedilol  cloNIDine  hydrALAZINE  hydrALAZINE Injectable PRN    Blood on hold, 2 Units  Periop antibiotics ordered  HD today as per renal  Cardiac risk stratification noted-cardiology consult appreciated

## 2018-03-27 NOTE — PROGRESS NOTE ADULT - PROBLEM SELECTOR PLAN 7
HBA1C: 7.6   continue sliding scale. 4 UI required in 24 hrs. Will consider other coverage if needed  -c/w Accu-Cheks ac hs tid   -c/w hypoglycemia protocol likely 2/2 stool softeners. d/jonatan all stool softeners and observe  Today improved, patient mentions no diarrhea.

## 2018-03-27 NOTE — PROGRESS NOTE ADULT - PROBLEM SELECTOR PLAN 2
baseline CKD 3 now progressed to ESRD requiring HD: with uremic pericarditis requiring dialysis as mentioned above  -Acute hepatitis panel negative,   -ppd at 72 hours 0mm negative  -Permacath on right side, neck. Placed  03/22/2018 per [facility] surgery.  -HD per renal  -planning on AVF/AVG, tentative on 03/28/2018 by vascular  -EPO weekly added on 03/22/18 per nephro  nutrition consulted, will adjust diet baseline CKD 3 now progressed to ESRD requiring HD: with uremic pericarditis requiring dialysis as mentioned above (c/w tx with colchicine bid for a total of 4 weeks which will end on 4/5)  -c/w standard HD sessions   -Acute hepatitis panel negative; ppd at 72 hours 0mm negative  -Permacath on right side, neck. Placed  03/22/2018  -planning on AVF/AVG, tentative on 03/28/2018 by vascular  -EPO weekly added on 03/22/18 per nephro

## 2018-03-27 NOTE — PROGRESS NOTE ADULT - SUBJECTIVE AND OBJECTIVE BOX
Pt seen and examined in HD. Vascular note from today appreciated. For OR tomorrow for possible Left AVF.    REVIEW OF SYSTEMS:  Constitutional: No fever, weight loss or fatigue  Cardiovascular: No chest pain, palpitations, dizziness or leg swelling  Gastrointestinal: No abdominal or epigastric pain. No nausea, vomiting or hematemesis; No diarrhea or constipation. No melena or hematochezia.  Skin: No itching, burning, rashes or lesions       MEDICATIONS:  MEDICATIONS  (STANDING):  artificial  tears Solution 1 Drop(s) Both EYES every 12 hours  aspirin enteric coated 81 milliGRAM(s) Oral daily  calcium acetate 667 milliGRAM(s) Oral three times a day with meals  carvedilol 12.5 milliGRAM(s) Oral every 12 hours  ceFAZolin   IVPB 2000 milliGRAM(s) IV Intermittent once  cloNIDine 0.2 milliGRAM(s) Oral two times a day  colchicine 0.6 milliGRAM(s) Oral two times a day  dextrose 5%. 1000 milliLiter(s) (50 mL/Hr) IV Continuous <Continuous>  dextrose 50% Injectable 12.5 Gram(s) IV Push once  dextrose 50% Injectable 25 Gram(s) IV Push once  dextrose 50% Injectable 25 Gram(s) IV Push once  epoetin gian Injectable 65323 Unit(s) IV Push <User Schedule>  ergocalciferol 08023 Unit(s) Oral every week  hydrALAZINE 50 milliGRAM(s) Oral every 8 hours  insulin lispro (HumaLOG) corrective regimen sliding scale   SubCutaneous three times a day before meals  insulin lispro (HumaLOG) corrective regimen sliding scale   SubCutaneous at bedtime  metoclopramide Injectable 10 milliGRAM(s) IV Push every 12 hours  pantoprazole    Tablet 40 milliGRAM(s) Oral two times a day  simvastatin 40 milliGRAM(s) Oral at bedtime    MEDICATIONS  (PRN):  acetaminophen   Tablet 650 milliGRAM(s) Oral every 6 hours PRN For Temp greater than 38 C (100.4 F)  acetaminophen   Tablet. 650 milliGRAM(s) Oral every 6 hours PRN Moderate Pain (4 - 6)  dextrose Gel 1 Dose(s) Oral once PRN Blood Glucose LESS THAN 70 milliGRAM(s)/deciliter  glucagon  Injectable 1 milliGRAM(s) IntraMuscular once PRN Glucose LESS THAN 70 milligrams/deciliter  hydrALAZINE Injectable 20 milliGRAM(s) IV Push every 6 hours PRN SBP > 160  ondansetron Injectable 4 milliGRAM(s) IV Push every 6 hours PRN Nausea      Allergies    No Known Allergies    Intolerances        Vital Signs Last 24 Hrs  T(C): 37.2 (27 Mar 2018 07:30), Max: 37.2 (27 Mar 2018 07:30)  T(F): 99 (27 Mar 2018 07:30), Max: 99 (27 Mar 2018 07:30)  HR: 77 (27 Mar 2018 07:30) (72 - 89)  BP: 170/91 (27 Mar 2018 07:30) (143/72 - 177/96)  BP(mean): --  RR: 18 (27 Mar 2018 07:30) (16 - 18)  SpO2: 94% (27 Mar 2018 07:30) (92% - 99%)    03-26 @ 07:01  -  03-27 @ 07:00  --------------------------------------------------------  IN: 1340 mL / OUT: 3200 mL / NET: -1860 mL        PHYSICAL EXAM:    General: Well developed; well nourished; in no acute distress  HEENT: MMM, conjunctiva pink and sclera anicteric.  Lungs: clear to auscultation bilaterally.  Cor: RRR S1, S2 only.  Gastrointestinal: Abdomen: Soft non-tender non-distended; Normal bowel sounds; No hepatosplenomegaly  Neuro: Pt. a + o x 3    LABS:      CBC Full  -  ( 26 Mar 2018 05:52 )  WBC Count : 7.8 K/uL  Hemoglobin : 8.4 g/dL  Hematocrit : 26.8 %  Platelet Count - Automated : 392 K/uL  Mean Cell Volume : 88.2 fl  Mean Cell Hemoglobin : 27.6 pg  Mean Cell Hemoglobin Concentration : 31.3 g/dL  Auto Neutrophil # : x  Auto Lymphocyte # : x  Auto Monocyte # : x  Auto Eosinophil # : x  Auto Basophil # : x  Auto Neutrophil % : x  Auto Lymphocyte % : x  Auto Monocyte % : x  Auto Eosinophil % : x  Auto Basophil % : x    03-26    130<L>  |  91<L>  |  21.0<H>  ----------------------------<  201<H>  4.2   |  25.0  |  4.04<H>    Ca    8.4<L>      26 Mar 2018 05:52  Phos  1.6     03-26  Mg     1.6     03-26    TPro  6.1<L>  /  Alb  2.4<L>  /  TBili  0.4  /  DBili  x   /  AST  42<H>  /  ALT  29  /  AlkPhos  176<H>  03-26                      RADIOLOGY & ADDITIONAL STUDIES (The following images were personally reviewed):

## 2018-03-27 NOTE — PROGRESS NOTE ADULT - PROBLEM SELECTOR PLAN 5
likely Anemia of chronic disease due to worsening acute on chronic renal insufficiency in the setting of CKD,  and concomitant  iron deficiency   -s/p 2 units pRBC on admission with improvement in h/h , another unit pRBC 3/23  -occult stool neg  -c/w ppi po bid   -GI consulted, deferred EGD tentatively on 03/29/2018. Cleared by Cardio on 03/25/2018  continue EPOGEN with HD and ferrous sulfate  Keep Hgb over 8 -not well controlled with elevations going up to 170's systolic   -c/w clonidine, increased today per renal on 03/27/2018 to 0.2mg BID  -increased hydralazine to 50mg tid and monitor   -continue coreg

## 2018-03-27 NOTE — PROGRESS NOTE ADULT - ATTENDING COMMENTS
Note addended where needed. Plan discussed with patient at bedside with Barbadian speaking resident MD. PCP Dr Gisselle Knight's office called to give an update. My number left to receive a call back. Also called emergency contact daughter in law and left a message with my information for the second day in a row.

## 2018-03-27 NOTE — PROGRESS NOTE ADULT - PROBLEM SELECTOR PLAN 4
-No further episodes of AFib  -no AC at this time until GI work up completed  [facility] Cardiology following and cleared for endoscopy on 03/25/2018  Rates well controlled on b blocker  S/P cath, neg ( done because of chest Pain and Mild elevated troponin on admission ) -No further episodes of AFib  -no AC at this time until GI work up completed  [facility] Cardiology following and cleared for endoscopy on 03/25/2018  Rates well controlled on b blocker  s/p negative cath; plan for GERALD prior to d/c

## 2018-03-27 NOTE — PROGRESS NOTE ADULT - PROBLEM SELECTOR PLAN 1
Resolved. Eventual EGD possibly Thursday or Friday. Pt. is scheduled for the OR tomorrow. Will proceed with EGD Thursday or Friday depending upon when her next HD is. Continue current management for now.

## 2018-03-27 NOTE — PROGRESS NOTE ADULT - PROBLEM SELECTOR PLAN 6
-not well controlled with elevations going up to 170's systolic   -c/w clonidine, increased today per renal on 03/27/2018 to 0.2mg BID  -increased hydralazine to 50mg tid and monitor   -continue coreg   -HD also helping with HTN HBA1C: 7.6   continue sliding scale. 4 UI required in 24 hrs. Will consider other coverage if needed  -c/w Accu-Cheks ac hs tid   -c/w hypoglycemia protocol

## 2018-03-27 NOTE — PROGRESS NOTE ADULT - SUBJECTIVE AND OBJECTIVE BOX
NEPHROLOGY INTERVAL HPI/OVERNIGHT EVENTS:    No new events.    MEDICATIONS  (STANDING):  artificial  tears Solution 1 Drop(s) Both EYES every 12 hours  aspirin enteric coated 81 milliGRAM(s) Oral daily  calcium acetate 667 milliGRAM(s) Oral three times a day with meals  carvedilol 12.5 milliGRAM(s) Oral every 12 hours  cloNIDine 0.1 milliGRAM(s) Oral two times a day  colchicine 0.6 milliGRAM(s) Oral two times a day  dextrose 5%. 1000 milliLiter(s) (50 mL/Hr) IV Continuous <Continuous>  dextrose 50% Injectable 12.5 Gram(s) IV Push once  dextrose 50% Injectable 25 Gram(s) IV Push once  dextrose 50% Injectable 25 Gram(s) IV Push once  docusate sodium 100 milliGRAM(s) Oral two times a day  epoetin gian Injectable 41336 Unit(s) IV Push <User Schedule>  ergocalciferol 32066 Unit(s) Oral every week  furosemide    Tablet 20 milliGRAM(s) Oral daily  hydrALAZINE 25 milliGRAM(s) Oral every 8 hours  insulin lispro (HumaLOG) corrective regimen sliding scale   SubCutaneous three times a day before meals  insulin lispro (HumaLOG) corrective regimen sliding scale   SubCutaneous at bedtime  metoclopramide Injectable 10 milliGRAM(s) IV Push every 12 hours  pantoprazole    Tablet 40 milliGRAM(s) Oral two times a day  polyethylene glycol 3350 17 Gram(s) Oral daily  PPD  5 Tuberculin Unit(s) Injectable 5 Unit(s) IntraDermal once  senna 2 Tablet(s) Oral at bedtime  simvastatin 40 milliGRAM(s) Oral at bedtime    MEDICATIONS  (PRN):  acetaminophen   Tablet 650 milliGRAM(s) Oral every 6 hours PRN For Temp greater than 38 C (100.4 F)  acetaminophen   Tablet. 650 milliGRAM(s) Oral every 6 hours PRN Moderate Pain (4 - 6)  dextrose Gel 1 Dose(s) Oral once PRN Blood Glucose LESS THAN 70 milliGRAM(s)/deciliter  glucagon  Injectable 1 milliGRAM(s) IntraMuscular once PRN Glucose LESS THAN 70 milligrams/deciliter  hydrALAZINE Injectable 20 milliGRAM(s) IV Push every 6 hours PRN SBP > 160  ondansetron Injectable 4 milliGRAM(s) IV Push every 6 hours PRN Nausea      Allergies    No Known Allergies        Vital Signs Last 24 Hrs    T(C): 37.2 (27 Mar 2018 07:30), Max: 37.2 (27 Mar 2018 07:30)  T(F): 99 (27 Mar 2018 07:30), Max: 99 (27 Mar 2018 07:30)  HR: 77 (27 Mar 2018 07:30) (72 - 89)  BP: 170/91 (27 Mar 2018 07:30) (143/72 - 177/96)  BP(mean): --  RR: 18 (27 Mar 2018 07:30) (16 - 18)  SpO2: 94% (27 Mar 2018 07:30) (92% - 99%)  T(C): 37 (25 Mar 2018 10:00), Max: 37.2 (24 Mar 2018 21:06)  T(F): 98.6 (25 Mar 2018 10:00), Max: 98.9 (24 Mar 2018 21:06)  HR: 78 (25 Mar 2018 10:00) (73 - 80)  BP: 138/70 (25 Mar 2018 10:00) (138/70 - 183/92)  BP(mean): --  RR: 16 (25 Mar 2018 10:00) (16 - 18)  SpO2: 95% (25 Mar 2018 10:00) (93% - 96%)    T(C): 37.3 (24 Mar 2018 06:00), Max: 37.3 (24 Mar 2018 06:00)  T(F): 99.1 (24 Mar 2018 06:00), Max: 99.1 (24 Mar 2018 06:00)  HR: 78 (24 Mar 2018 06:00) (64 - 78)  BP: 162/84 (24 Mar 2018 06:00) (134/68 - 194/99)  BP(mean): --  RR: 18 (24 Mar 2018 06:00) (13 - 23)  SpO2: 97% (24 Mar 2018 06:00) (95% - 99%)  T(C): 36.7 (22 Mar 2018 05:20), Max: 36.8 (21 Mar 2018 21:01)  T(F): 98 (22 Mar 2018 05:20), Max: 98.2 (21 Mar 2018 21:01)  HR: 79 (22 Mar 2018 05:20) (66 - 79)  BP: 194/94 (22 Mar 2018 05:20) (141/72 - 194/94)  BP(mean): --  RR: 16 (22 Mar 2018 05:20) (16 - 18)  SpO2: 99% (22 Mar 2018 05:20) (92% - 100%)    PHYSICAL EXAM:  GENERAL: Appears  chronically  ill  HEAD:  Periorbital edema better  EYES: Same  NECK: Supple, No JVD, right permacath  site  clean  NERVOUS SYSTEM:  Alert & Oriented   CHEST/LUNG: Diminished BS bilaterally (L > R)   HEART: Regular rate and rhythm; No rub, murmur same  ABDOMEN: Soft, +BS  EXTREMITIES:  trace edema  SKIN: No rashes or lesions      LABS:    03-26    130<L>  |  91<L>  |  21.0<H>  ----------------------------<  201<H>  4.2   |  25.0  |  4.04<H>    Ca    8.4<L>      26 Mar 2018 05:52  Phos  1.6     03-26  Mg     1.6     03-26    TPro  6.1<L>  /  Alb  2.4<L>  /  TBili  0.4  /  DBili  x   /  AST  42<H>  /  ALT  29  /  AlkPhos  176<H>  03-26 03-25    134<L>  |  96<L>  |  13.0  ----------------------------<  154<H>  4.0   |  26.0  |  2.58<H>    Ca    8.0<L>      25 Mar 2018 05:34        03-24    130<L>  |  93<L>  |  25.0<H>  ----------------------------<  175<H>  3.9   |  25.0  |  3.38<H>    Ca    8.0<L>      24 Mar 2018 07:08  Phos  2.3     03-23  Mg     1.8     03-23                            8.9    8.1   )-----------( 514      ( 22 Mar 2018 06:35 )             26.9     03-22    127<L>  |  89<L>  |  37.0<H>  ----------------------------<  154<H>  4.0   |  22.0  |  5.21<H>    Ca    8.0<L>      22 Mar 2018 06:35    TPro  6.0<L>  /  Alb  2.1<L>  /  TBili  0.3<L>  /  DBili  x   /  AST  62<H>  /  ALT  39<H>  /  AlkPhos  121<H>  03-20            RADIOLOGY & ADDITIONAL TESTS:

## 2018-03-28 DIAGNOSIS — K92.0 HEMATEMESIS: ICD-10-CM

## 2018-03-28 LAB
ANION GAP SERPL CALC-SCNC: 14 MMOL/L — SIGNIFICANT CHANGE UP (ref 5–17)
BASOPHILS # BLD AUTO: 0 K/UL — SIGNIFICANT CHANGE UP (ref 0–0.2)
BASOPHILS NFR BLD AUTO: 0.4 % — SIGNIFICANT CHANGE UP (ref 0–2)
BUN SERPL-MCNC: 18 MG/DL — SIGNIFICANT CHANGE UP (ref 8–20)
CALCIUM SERPL-MCNC: 8.4 MG/DL — LOW (ref 8.6–10.2)
CHLORIDE SERPL-SCNC: 92 MMOL/L — LOW (ref 98–107)
CO2 SERPL-SCNC: 25 MMOL/L — SIGNIFICANT CHANGE UP (ref 22–29)
CREAT SERPL-MCNC: 2.88 MG/DL — HIGH (ref 0.5–1.3)
EOSINOPHIL # BLD AUTO: 0.2 K/UL — SIGNIFICANT CHANGE UP (ref 0–0.5)
EOSINOPHIL NFR BLD AUTO: 3.2 % — SIGNIFICANT CHANGE UP (ref 0–6)
GLUCOSE BLDC GLUCOMTR-MCNC: 186 MG/DL — HIGH (ref 70–99)
GLUCOSE BLDC GLUCOMTR-MCNC: 189 MG/DL — HIGH (ref 70–99)
GLUCOSE BLDC GLUCOMTR-MCNC: 205 MG/DL — HIGH (ref 70–99)
GLUCOSE BLDC GLUCOMTR-MCNC: 279 MG/DL — HIGH (ref 70–99)
GLUCOSE SERPL-MCNC: 192 MG/DL — HIGH (ref 70–115)
HCT VFR BLD CALC: 28 % — LOW (ref 37–47)
HGB BLD-MCNC: 8.9 G/DL — LOW (ref 12–16)
INR BLD: 1.12 RATIO — SIGNIFICANT CHANGE UP (ref 0.88–1.16)
LYMPHOCYTES # BLD AUTO: 0.8 K/UL — LOW (ref 1–4.8)
LYMPHOCYTES # BLD AUTO: 10.9 % — LOW (ref 20–55)
MAGNESIUM SERPL-MCNC: 1.7 MG/DL — SIGNIFICANT CHANGE UP (ref 1.6–2.6)
MCHC RBC-ENTMCNC: 27.8 PG — SIGNIFICANT CHANGE UP (ref 27–31)
MCHC RBC-ENTMCNC: 31.8 G/DL — LOW (ref 32–36)
MCV RBC AUTO: 87.5 FL — SIGNIFICANT CHANGE UP (ref 81–99)
MONOCYTES # BLD AUTO: 1.1 K/UL — HIGH (ref 0–0.8)
MONOCYTES NFR BLD AUTO: 15.8 % — HIGH (ref 3–10)
NEUTROPHILS # BLD AUTO: 4.7 K/UL — SIGNIFICANT CHANGE UP (ref 1.8–8)
NEUTROPHILS NFR BLD AUTO: 68.8 % — SIGNIFICANT CHANGE UP (ref 37–73)
PHOSPHATE SERPL-MCNC: 1.8 MG/DL — LOW (ref 2.4–4.7)
PLATELET # BLD AUTO: 402 K/UL — HIGH (ref 150–400)
POTASSIUM SERPL-MCNC: 4.3 MMOL/L — SIGNIFICANT CHANGE UP (ref 3.5–5.3)
POTASSIUM SERPL-SCNC: 4.3 MMOL/L — SIGNIFICANT CHANGE UP (ref 3.5–5.3)
PROTHROM AB SERPL-ACNC: 12.4 SEC — SIGNIFICANT CHANGE UP (ref 9.8–12.7)
RBC # BLD: 3.2 M/UL — LOW (ref 4.4–5.2)
RBC # FLD: 14.3 % — SIGNIFICANT CHANGE UP (ref 11–15.6)
SODIUM SERPL-SCNC: 131 MMOL/L — LOW (ref 135–145)
WBC # BLD: 6.8 K/UL — SIGNIFICANT CHANGE UP (ref 4.8–10.8)
WBC # FLD AUTO: 6.8 K/UL — SIGNIFICANT CHANGE UP (ref 4.8–10.8)

## 2018-03-28 PROCEDURE — 99232 SBSQ HOSP IP/OBS MODERATE 35: CPT

## 2018-03-28 PROCEDURE — 36830 ARTERY-VEIN NONAUTOGRAFT: CPT | Mod: AS,58,LT

## 2018-03-28 PROCEDURE — 99233 SBSQ HOSP IP/OBS HIGH 50: CPT | Mod: GC

## 2018-03-28 PROCEDURE — 36830 ARTERY-VEIN NONAUTOGRAFT: CPT | Mod: 58,LT

## 2018-03-28 RX ORDER — GLUCAGON INJECTION, SOLUTION 0.5 MG/.1ML
1 INJECTION, SOLUTION SUBCUTANEOUS ONCE
Qty: 0 | Refills: 0 | Status: DISCONTINUED | OUTPATIENT
Start: 2018-03-28 | End: 2018-04-03

## 2018-03-28 RX ORDER — METOCLOPRAMIDE HCL 10 MG
10 TABLET ORAL EVERY 12 HOURS
Qty: 0 | Refills: 0 | Status: DISCONTINUED | OUTPATIENT
Start: 2018-03-28 | End: 2018-03-29

## 2018-03-28 RX ORDER — HYDRALAZINE HCL 50 MG
50 TABLET ORAL EVERY 8 HOURS
Qty: 0 | Refills: 0 | Status: DISCONTINUED | OUTPATIENT
Start: 2018-03-28 | End: 2018-04-03

## 2018-03-28 RX ORDER — ONDANSETRON 8 MG/1
4 TABLET, FILM COATED ORAL ONCE
Qty: 0 | Refills: 0 | Status: DISCONTINUED | OUTPATIENT
Start: 2018-03-28 | End: 2018-03-28

## 2018-03-28 RX ORDER — INSULIN LISPRO 100/ML
VIAL (ML) SUBCUTANEOUS
Qty: 0 | Refills: 0 | Status: DISCONTINUED | OUTPATIENT
Start: 2018-03-28 | End: 2018-04-03

## 2018-03-28 RX ORDER — ONDANSETRON 8 MG/1
4 TABLET, FILM COATED ORAL EVERY 6 HOURS
Qty: 0 | Refills: 0 | Status: DISCONTINUED | OUTPATIENT
Start: 2018-03-28 | End: 2018-04-03

## 2018-03-28 RX ORDER — DEXTROSE 50 % IN WATER 50 %
1 SYRINGE (ML) INTRAVENOUS ONCE
Qty: 0 | Refills: 0 | Status: DISCONTINUED | OUTPATIENT
Start: 2018-03-28 | End: 2018-04-03

## 2018-03-28 RX ORDER — DOCUSATE SODIUM 100 MG
100 CAPSULE ORAL DAILY
Qty: 0 | Refills: 0 | Status: DISCONTINUED | OUTPATIENT
Start: 2018-03-28 | End: 2018-04-03

## 2018-03-28 RX ORDER — COLCHICINE 0.6 MG
0.6 TABLET ORAL
Qty: 0 | Refills: 0 | Status: DISCONTINUED | OUTPATIENT
Start: 2018-03-28 | End: 2018-04-03

## 2018-03-28 RX ORDER — ACETAMINOPHEN 500 MG
650 TABLET ORAL EVERY 6 HOURS
Qty: 0 | Refills: 0 | Status: DISCONTINUED | OUTPATIENT
Start: 2018-03-28 | End: 2018-04-03

## 2018-03-28 RX ORDER — SODIUM CHLORIDE 9 MG/ML
1000 INJECTION, SOLUTION INTRAVENOUS
Qty: 0 | Refills: 0 | Status: DISCONTINUED | OUTPATIENT
Start: 2018-03-28 | End: 2018-03-28

## 2018-03-28 RX ORDER — ERGOCALCIFEROL 1.25 MG/1
50000 CAPSULE ORAL
Qty: 0 | Refills: 0 | Status: DISCONTINUED | OUTPATIENT
Start: 2018-03-28 | End: 2018-04-03

## 2018-03-28 RX ORDER — DEXTROSE 50 % IN WATER 50 %
12.5 SYRINGE (ML) INTRAVENOUS ONCE
Qty: 0 | Refills: 0 | Status: DISCONTINUED | OUTPATIENT
Start: 2018-03-28 | End: 2018-04-03

## 2018-03-28 RX ORDER — CARVEDILOL PHOSPHATE 80 MG/1
12.5 CAPSULE, EXTENDED RELEASE ORAL EVERY 12 HOURS
Qty: 0 | Refills: 0 | Status: DISCONTINUED | OUTPATIENT
Start: 2018-03-28 | End: 2018-04-03

## 2018-03-28 RX ORDER — SENNA PLUS 8.6 MG/1
2 TABLET ORAL AT BEDTIME
Qty: 0 | Refills: 0 | Status: DISCONTINUED | OUTPATIENT
Start: 2018-03-28 | End: 2018-04-03

## 2018-03-28 RX ORDER — ERYTHROPOIETIN 10000 [IU]/ML
10000 INJECTION, SOLUTION INTRAVENOUS; SUBCUTANEOUS
Qty: 0 | Refills: 0 | Status: DISCONTINUED | OUTPATIENT
Start: 2018-03-28 | End: 2018-04-03

## 2018-03-28 RX ORDER — DEXTROSE 50 % IN WATER 50 %
25 SYRINGE (ML) INTRAVENOUS ONCE
Qty: 0 | Refills: 0 | Status: DISCONTINUED | OUTPATIENT
Start: 2018-03-28 | End: 2018-04-03

## 2018-03-28 RX ORDER — HEPARIN SODIUM 5000 [USP'U]/ML
5000 INJECTION INTRAVENOUS; SUBCUTANEOUS EVERY 12 HOURS
Qty: 0 | Refills: 0 | Status: DISCONTINUED | OUTPATIENT
Start: 2018-03-28 | End: 2018-03-29

## 2018-03-28 RX ORDER — ASPIRIN/CALCIUM CARB/MAGNESIUM 324 MG
81 TABLET ORAL DAILY
Qty: 0 | Refills: 0 | Status: DISCONTINUED | OUTPATIENT
Start: 2018-03-28 | End: 2018-04-03

## 2018-03-28 RX ORDER — FENTANYL CITRATE 50 UG/ML
50 INJECTION INTRAVENOUS
Qty: 0 | Refills: 0 | Status: DISCONTINUED | OUTPATIENT
Start: 2018-03-28 | End: 2018-03-28

## 2018-03-28 RX ORDER — SIMVASTATIN 20 MG/1
40 TABLET, FILM COATED ORAL AT BEDTIME
Qty: 0 | Refills: 0 | Status: DISCONTINUED | OUTPATIENT
Start: 2018-03-28 | End: 2018-04-03

## 2018-03-28 RX ADMIN — Medication 0.2 MILLIGRAM(S): at 23:01

## 2018-03-28 RX ADMIN — Medication 50 MILLIGRAM(S): at 23:01

## 2018-03-28 RX ADMIN — Medication 100 MILLIGRAM(S): at 23:01

## 2018-03-28 RX ADMIN — Medication 10 MILLIGRAM(S): at 08:23

## 2018-03-28 RX ADMIN — PANTOPRAZOLE SODIUM 40 MILLIGRAM(S): 20 TABLET, DELAYED RELEASE ORAL at 05:53

## 2018-03-28 RX ADMIN — Medication 0.6 MILLIGRAM(S): at 23:01

## 2018-03-28 RX ADMIN — Medication 10 MILLIGRAM(S): at 05:53

## 2018-03-28 RX ADMIN — Medication 2: at 17:21

## 2018-03-28 RX ADMIN — CARVEDILOL PHOSPHATE 12.5 MILLIGRAM(S): 80 CAPSULE, EXTENDED RELEASE ORAL at 23:01

## 2018-03-28 RX ADMIN — SENNA PLUS 2 TABLET(S): 8.6 TABLET ORAL at 23:01

## 2018-03-28 RX ADMIN — Medication 20 MILLIGRAM(S): at 08:57

## 2018-03-28 RX ADMIN — Medication 1 DROP(S): at 05:53

## 2018-03-28 RX ADMIN — Medication 50 MILLIGRAM(S): at 05:53

## 2018-03-28 RX ADMIN — Medication 0.2 MILLIGRAM(S): at 05:53

## 2018-03-28 RX ADMIN — SIMVASTATIN 40 MILLIGRAM(S): 20 TABLET, FILM COATED ORAL at 23:01

## 2018-03-28 RX ADMIN — CARVEDILOL PHOSPHATE 12.5 MILLIGRAM(S): 80 CAPSULE, EXTENDED RELEASE ORAL at 05:53

## 2018-03-28 RX ADMIN — Medication 0.6 MILLIGRAM(S): at 05:54

## 2018-03-28 NOTE — PROGRESS NOTE ADULT - PROBLEM SELECTOR PLAN 3
as above- will plan for EGD in AM but need hemoglobin above 8 and would prefer HD today as well- otherwise HD tomorrow and EGD on Friday.

## 2018-03-28 NOTE — PROGRESS NOTE ADULT - PROBLEM SELECTOR PLAN 1
likely Anemia of chronic disease due to worsening acute on chronic renal insufficiency in the setting of CKD,  and concomitant  iron deficiency   -s/p 4 pRBC total: 2 units pRBCon admission with improvement in h/h , another unit pRBC 3/23, another on 03/27/2018.  -occult stool neg  -c/w ppi po bid   -GI consulted, deferred EGD tentatively on 03/29/2018 or 03/30/2018 if Hemog >8 and the day after HD. Cleared by Cardio on 03/25/2018  continue EPOGEN with HD and ferrous sulfate  Keep Hgb over 8. 1 unit transfused yesterday likely Anemia of chronic disease due to worsening acute on chronic renal insufficiency in the setting of CKD,  and concomitant  iron deficiency   -s/p 4 pRBC total: 2 units pRBCon admission with improvement in h/h , another unit pRBC 3/23, another on 03/27/2018.  -occult stool neg  -c/w ppi po bid   Hgb >8 and the day after HD. Cleared by Cardio on 03/25/2018  continue EPOGEN with HD and ferrous sulfate  Keep Hgb over 8. 1 unit transfused yesterday

## 2018-03-28 NOTE — BRIEF OPERATIVE NOTE - PROCEDURE
<<-----Click on this checkbox to enter Procedure Arteriovenous anastomosis of upper extremity  03/28/2018  AVG,  Brachial artery to Axilary vein  Active  CLAMBERT

## 2018-03-28 NOTE — PROGRESS NOTE ADULT - PROBLEM SELECTOR PLAN 5
-not well controlled with elevations going up to 170's systolic   -c/w clonidine, increased today per renal on 03/27/2018 to 0.2mg BID  -increased hydralazine to 50mg tid and monitor   -continue coreg -not well controlled , SBp around 150's  -c/w clonidine, increased today per renal on 03/27/2018 to 0.2mg BID  -increased hydralazine to 50mg tid and monitor   -continue coreg

## 2018-03-28 NOTE — PROGRESS NOTE ADULT - PROBLEM SELECTOR PLAN 4
-No further episodes of AFib  -no AC at this time until GI work up completed  [facility] Cardiology following and cleared for endoscopy on 03/25/2018  Rates well controlled on b blocker  s/p negative cath; plan for GERALD prior to d/c

## 2018-03-28 NOTE — PROGRESS NOTE ADULT - SUBJECTIVE AND OBJECTIVE BOX
Patient is a 57y old  Female who presents with a chief complaint of Nausea/vomiting and Chest pain (16 Mar 2018 17:20)    INTERVAL HPI/OVERNIGHT EVENTS:  Patient is a 58 y/o Female seen and examined at bedside. Patient received 1 PRBC due to H/H 7.7/24.1. She has right arm painful bruising since 3 days ago, now stable-decreasing, ipsilateral to catheterization from Friday. Patient has been doing well mentions constipation, addressed overnight. LBM: 03/27/2018. HD schedule as per renal. Patient went yesterday.    ROS:  Patient denying CP, SOB, palpitations, leg swelling, lightheadedness/dizziness, paresthesias. All other ROS negative     Vital Signs Last 24 Hrs  T(C): 36.8 (27 Mar 2018 23:22), Max: 36.8 (27 Mar 2018 14:31)  T(F): 98.2 (27 Mar 2018 23:22), Max: 98.3 (27 Mar 2018 14:31)  HR: 84 (28 Mar 2018 05:51) (70 - 84)  BP: 159/83 (28 Mar 2018 05:51) (152/74 - 171/79)  RR: 18 (27 Mar 2018 23:22) (17 - 18)  SpO2: 96% (27 Mar 2018 23:22) (94% - 97%)    CAPILLARY BLOOD GLUCOSE      POCT Blood Glucose.: 174 mg/dL (27 Mar 2018 22:12)  POCT Blood Glucose.: 224 mg/dL (27 Mar 2018 16:46)  POCT Blood Glucose.: 158 mg/dL (27 Mar 2018 12:05)  POCT Blood Glucose.: 156 mg/dL (27 Mar 2018 08:20)      PHYSICAL EXAM:    GENERAL: NAD, well-groomed, well-developed.  HEENT: eomi, perrla, MMM  CHEST/LUNG: fair b/l ae. CTA BL no wheezing, no crackles noted  HEART: S1S2nl, no r/g noted now. + 3/6 SM. No pericardial rub noted   ABDOMEN: Soft, Nontender, Nondistended; Bowel sounds present  EXTREMITIES:  2+ Peripheral Pulses, No clubbing, cyanosis, or edema in b/l LE. RUE with swelling to fingers with medial upper arm ecchymosis, decreased in size from yesterday and fading. Pulses in right arm palpable with normal temp  Skin: right sided PC in place (neck)  NERVOUS SYSTEM:  Alert & Oriented X3, Good concentration; Motor Strength 5/5 B/L upper and lower extremities; DTRs 2+ intact and symmetric    LABS:  pending morning labs                        7.7    6.7   )-----------( 396      ( 27 Mar 2018 08:26 )             24.1   03-27    132<L>  |  94<L>  |  18.0  ----------------------------<  171<H>  3.7   |  25.0  |  3.62<H>    Ca    7.9<L>      27 Mar 2018 08:26  Phos  1.6     03-26  Mg     1.6     03-26    TPro  6.1<L>  /  Alb  2.4<L>  /  TBili  0.4  /  DBili  x   /  AST  42<H>  /  ALT  29  /  AlkPhos  176<H>  03-26        Hemoglobin A1C, Whole Blood: 7.5 % (03-14 @ 11:12)    Culture Results:   No growth at 2 days.  Culture in progress (03-21 @ 11:30)  Culture Results:   No growth at 3 days.  Culture in progress (03-20 @ 12:48)  Culture Results:   <10,000 CFU/ml Gram Positive Cocci in Clusters  <10,000 CFU/ml Gram Positive Rods (03-17 @ 21:37)  Culture Results:   No growth at 5 days. (03-13 @ 22:13)  Culture Results:   No growth at 5 days. (03-13 @ 22:12)    RADIOLOGY & ADDITIONAL TESTS:       EXAM:  US DPLX [place] RT                        PROCEDURE DATE:  03/25/2018    INTERPRETATION:  HISTORY: Right radial artery catheterization. Pain.   Pseudoaneurysm.  TECHNIQUE: Grey-scale and color-flow Doppler imaging   FINDINGS: No evidence of hematoma or pseudoaneurysm seen. Right radial   and ulnar arteries are patent.  IMPRESSION: No evidence of pseudoaneurysm  NISHA ZAVALA M.D., ATTENDING RADIOLOGIST  This document has been electronically signed. Mar 25 2018  4:24PM      EXAM:  US JOINT NONVASC EXT LTD RT                        PROCEDURE DATE:  03/25/2018    INTERPRETATION:  Clinical information: Worsening pain at site of IV   infiltration in medial aspect of right arm  Sonography of the right upper extremity was performed at the area of   clinical concern.  COMPARISON: None  FINDINGS/  IMPRESSION: No mass, hematoma or fluid collection is identified.  SYLVIE DAILEY M.D., ATTENDING RADIOLOGIST  This document has been electronically signed. Mar 25 2018  4:22PM           EXAM:  US DPLX UPR EXT VEINS LTD RT                        PROCEDURE DATE:  03/25/2018    INTERPRETATION:  Right upper extremity venous Doppler ultrasound   COMPARISON: None.  CLINICAL INFORMATION: Swelling, pain.  FINDINGS:  Right subclavian vein has limited visualization due to catheter placement  The right internal jugular, axillary, brachial, radial and ulnar veins show normal flow and are compressible where feasible, without evidence of DVT.  The cephalic vein is patent and compressible.  The basilic vein contains a focal thrombus within the mid forearm distally .  IMPRESSION:  Superficial basilic vein distal thrombus as described.  No evidence of upper extremity DVT. Incomplete evaluation of subclavian vein due to indwelling catheter.  NISHA ZAVALA M.D., ATTENDING RADIOLOGIST  This document has been electronically signed. Mar 25 2018  4:21PM      Referring Physician:  INDICATIONS: Acute diastolic congestive heart failure.  HISTORY: 58 yo woman with ESKD now on dialysis, admitted with worsening  SOB, orthopnea and anemia. She had bilateral pleural effusion and moderate  pericardial effusion. There was no prior cardiac history. The patient has  hypertension and dialysis-treated renal failure.  PROCEDURE:  --  Left heart catheterization with ventriculography.  --  Left coronary angiography.  --  Right coronary angiography.  TECHNIQUE: The risks and alternatives of the procedures and conscious  sedation were explained to the patient and informed consent was obtained.  Cardiac catheterization performed electively.  Local anesthetic given. Right radial artery access. Left heart  catheterization. Ventriculography was performed. Left coronary artery  angiography. The vessel was injected utilizing a catheter. Right coronary  artery angiography. The vessel was injected utilizing a catheter.  RADIATION EXPOSURE: 3 min.  CONTRAST GIVEN: Omnipaque 60 ml.  MEDICATIONS GIVEN: Verapamil (Isoptin, Calan, Covera), 5 mg, IA. Heparin,  3000 units, IA. 1% Lidocaine, 10 ml, subcutaneously.  VENTRICLES: There were no left ventricular global or regional wall motion  abnormalities. Global left ventricular function was normal. EF calculated  by contrast ventriculography was 55 %.  VALVES: AORTIC VALVE: No significant aortic valve gradient. MITRAL VALVE:  The mitral valve exhibited trivial regurgitation (less than 1+).  CORONARY VESSELS: The coronary circulation is right dominant.  LM:   --  LM: Normal. The vessel was normal sized, not calcified, and not  tortuous. Angiography showed no evidence of disease.  LAD:   --  LAD: Normal. The vessel was normal sized, not calcified, and not  tortuous. Angiography showed minor luminal irregularities with no flow  limiting lesions.  CX:   --  Circumflex: Normal. The vessel was normal sized, not calcified,  and excessively tortuous.Angiography showed no evidence of disease.  RCA:   --  RCA: Normal. The vessel was normal sized, not calcified, and  moderately tortuous. Angiography showed no evidence of disease.  COMPLICATIONS: There were no complications. No complications occurred  during the cath lab visit.  DIAGNOSTIC IMPRESSIONS: There is mild irregularity of the coronary anatomy.  Left ventricular function is normal (LVEF=55%).  DIAGNOSTIC RECOMMENDATIONS: The patient should continue with the present  medications. Patientmanagement should include aggressive medical therapy  and resumption of all previous activities in 2 days.      MEDICATIONS  (STANDING):  artificial  tears Solution 1 Drop(s) Both EYES every 12 hours  aspirin enteric coated 81 milliGRAM(s) Oral daily  calcium acetate 667 milliGRAM(s) Oral three times a day with meals  carvedilol 12.5 milliGRAM(s) Oral every 12 hours  ceFAZolin   IVPB 2000 milliGRAM(s) IV Intermittent once  cloNIDine 0.2 milliGRAM(s) Oral two times a day  colchicine 0.6 milliGRAM(s) Oral two times a day  dextrose 5%. 1000 milliLiter(s) (50 mL/Hr) IV Continuous <Continuous>  dextrose 50% Injectable 12.5 Gram(s) IV Push once  dextrose 50% Injectable 25 Gram(s) IV Push once  dextrose 50% Injectable 25 Gram(s) IV Push once  epoetin gian Injectable 23432 Unit(s) IV Push <User Schedule>  ergocalciferol 24951 Unit(s) Oral every week  hydrALAZINE 50 milliGRAM(s) Oral every 8 hours  insulin lispro (HumaLOG) corrective regimen sliding scale   SubCutaneous three times a day before meals  insulin lispro (HumaLOG) corrective regimen sliding scale   SubCutaneous at bedtime  metoclopramide Injectable 10 milliGRAM(s) IV Push every 12 hours  pantoprazole    Tablet 40 milliGRAM(s) Oral two times a day  simvastatin 40 milliGRAM(s) Oral at bedtime    MEDICATIONS  (PRN):  acetaminophen   Tablet 650 milliGRAM(s) Oral every 6 hours PRN For Temp greater than 38 C (100.4 F)  acetaminophen   Tablet. 650 milliGRAM(s) Oral every 6 hours PRN Moderate Pain (4 - 6)  dextrose Gel 1 Dose(s) Oral once PRN Blood Glucose LESS THAN 70 milliGRAM(s)/deciliter  glucagon  Injectable 1 milliGRAM(s) IntraMuscular once PRN Glucose LESS THAN 70 milligrams/deciliter  hydrALAZINE Injectable 20 milliGRAM(s) IV Push every 6 hours PRN SBP > 160  ondansetron Injectable 4 milliGRAM(s) IV Push every 6 hours PRN Nausea Patient is a 57y old  Female who presents with a chief complaint of Nausea/vomiting and Chest pain (16 Mar 2018 17:20)    INTERVAL HPI/OVERNIGHT EVENTS:  Patient is a 56 y/o Female seen and examined at bedside. Patient received 1 PRBC due to H/H 7.7/24.1. She has right arm painful bruising since 3 days ago, now stable-decreasing, ipsilateral to catheterization from Friday. Patient has been doing well mentions constipation, addressed overnight. LBM: 03/27/2018. HD schedule as per renal. Patient went yesterday.    ROS:  Patient denying CP, SOB, palpitations, leg swelling, lightheadedness/dizziness, paresthesias. All other ROS negative     Vital Signs Last 24 Hrs  T(C): 36.8 (27 Mar 2018 23:22), Max: 36.8 (27 Mar 2018 14:31)  T(F): 98.2 (27 Mar 2018 23:22), Max: 98.3 (27 Mar 2018 14:31)  HR: 84 (28 Mar 2018 05:51) (70 - 84)  BP: 159/83 (28 Mar 2018 05:51) (152/74 - 171/79)  RR: 18 (27 Mar 2018 23:22) (17 - 18)  SpO2: 96% (27 Mar 2018 23:22) (94% - 97%)    CAPILLARY BLOOD GLUCOSE      POCT Blood Glucose.: 174 mg/dL (27 Mar 2018 22:12)  POCT Blood Glucose.: 224 mg/dL (27 Mar 2018 16:46)  POCT Blood Glucose.: 158 mg/dL (27 Mar 2018 12:05)  POCT Blood Glucose.: 156 mg/dL (27 Mar 2018 08:20)      PHYSICAL EXAM:    GENERAL: NAD, well-groomed, well-developed.  HEENT: eomi, perrla, MMM  CHEST/LUNG: fair b/l ae. CTA BL no wheezing, no crackles noted  HEART: S1S2nl, no r/g noted now. + 3/6 SM. No pericardial rub noted   ABDOMEN: Soft, Nontender, Nondistended; Bowel sounds present  EXTREMITIES:  2+ Peripheral Pulses, No clubbing, cyanosis, or edema in b/l LE. RUE with swelling to fingers with medial upper arm ecchymosis, decreased in size from yesterday and fading. Pulses in right arm palpable with normal temp  Skin: right sided PC in place (neck)  NERVOUS SYSTEM:  Alert & Oriented X3, Good concentration; Motor Strength 5/5 B/L upper and lower extremities; DTRs 2+ intact and symmetric    LABS:                        8.9    6.8   )-----------( 402      ( 28 Mar 2018 10:06 )             28.0     03-28    131<L>  |  92<L>  |  18.0  ----------------------------<  192<H>  4.3   |  25.0  |  2.88<H>    Ca    8.4<L>      28 Mar 2018 10:06  Phos  1.8     03-28  Mg     1.7     03-28          Hemoglobin A1C, Whole Blood: 7.5 % (03-14 @ 11:12)    Culture Results:   No growth at 2 days.  Culture in progress (03-21 @ 11:30)  Culture Results:   No growth at 3 days.  Culture in progress (03-20 @ 12:48)  Culture Results:   <10,000 CFU/ml Gram Positive Cocci in Clusters  <10,000 CFU/ml Gram Positive Rods (03-17 @ 21:37)  Culture Results:   No growth at 5 days. (03-13 @ 22:13)  Culture Results:   No growth at 5 days. (03-13 @ 22:12)    RADIOLOGY & ADDITIONAL TESTS:       EXAM:  US DPLX [place] RT                        PROCEDURE DATE:  03/25/2018    INTERPRETATION:  HISTORY: Right radial artery catheterization. Pain.   Pseudoaneurysm.  TECHNIQUE: Grey-scale and color-flow Doppler imaging   FINDINGS: No evidence of hematoma or pseudoaneurysm seen. Right radial   and ulnar arteries are patent.  IMPRESSION: No evidence of pseudoaneurysm  NISHA ZAVALA M.D., ATTENDING RADIOLOGIST  This document has been electronically signed. Mar 25 2018  4:24PM      EXAM:  US JOINT NONVASC EXT LTD RT                        PROCEDURE DATE:  03/25/2018    INTERPRETATION:  Clinical information: Worsening pain at site of IV   infiltration in medial aspect of right arm  Sonography of the right upper extremity was performed at the area of   clinical concern.  COMPARISON: None  FINDINGS/  IMPRESSION: No mass, hematoma or fluid collection is identified.  SYLVIE DAILEY M.D., ATTENDING RADIOLOGIST  This document has been electronically signed. Mar 25 2018  4:22PM           EXAM:  US DPLX UPR EXT VEINS LTD RT                        PROCEDURE DATE:  03/25/2018    INTERPRETATION:  Right upper extremity venous Doppler ultrasound   COMPARISON: None.  CLINICAL INFORMATION: Swelling, pain.  FINDINGS:  Right subclavian vein has limited visualization due to catheter placement  The right internal jugular, axillary, brachial, radial and ulnar veins show normal flow and are compressible where feasible, without evidence of DVT.  The cephalic vein is patent and compressible.  The basilic vein contains a focal thrombus within the mid forearm distally .  IMPRESSION:  Superficial basilic vein distal thrombus as described.  No evidence of upper extremity DVT. Incomplete evaluation of subclavian vein due to indwelling catheter.  NISHA ZAVALA M.D., ATTENDING RADIOLOGIST  This document has been electronically signed. Mar 25 2018  4:21PM      Referring Physician:  INDICATIONS: Acute diastolic congestive heart failure.  HISTORY: 56 yo woman with ESKD now on dialysis, admitted with worsening  SOB, orthopnea and anemia. She had bilateral pleural effusion and moderate  pericardial effusion. There was no prior cardiac history. The patient has  hypertension and dialysis-treated renal failure.  PROCEDURE:  --  Left heart catheterization with ventriculography.  --  Left coronary angiography.  --  Right coronary angiography.  TECHNIQUE: The risks and alternatives of the procedures and conscious  sedation were explained to the patient and informed consent was obtained.  Cardiac catheterization performed electively.  Local anesthetic given. Right radial artery access. Left heart  catheterization. Ventriculography was performed. Left coronary artery  angiography. The vessel was injected utilizing a catheter. Right coronary  artery angiography. The vessel was injected utilizing a catheter.  RADIATION EXPOSURE: 3 min.  CONTRAST GIVEN: Omnipaque 60 ml.  MEDICATIONS GIVEN: Verapamil (Isoptin, Calan, Covera), 5 mg, IA. Heparin,  3000 units, IA. 1% Lidocaine, 10 ml, subcutaneously.  VENTRICLES: There were no left ventricular global or regional wall motion  abnormalities. Global left ventricular function was normal. EF calculated  by contrast ventriculography was 55 %.  VALVES: AORTIC VALVE: No significant aortic valve gradient. MITRAL VALVE:  The mitral valve exhibited trivial regurgitation (less than 1+).  CORONARY VESSELS: The coronary circulation is right dominant.  LM:   --  LM: Normal. The vessel was normal sized, not calcified, and not  tortuous. Angiography showed no evidence of disease.  LAD:   --  LAD: Normal. The vessel was normal sized, not calcified, and not  tortuous. Angiography showed minor luminal irregularities with no flow  limiting lesions.  CX:   --  Circumflex: Normal. The vessel was normal sized, not calcified,  and excessively tortuous.Angiography showed no evidence of disease.  RCA:   --  RCA: Normal. The vessel was normal sized, not calcified, and  moderately tortuous. Angiography showed no evidence of disease.  COMPLICATIONS: There were no complications. No complications occurred  during the cath lab visit.  DIAGNOSTIC IMPRESSIONS: There is mild irregularity of the coronary anatomy.  Left ventricular function is normal (LVEF=55%).  DIAGNOSTIC RECOMMENDATIONS: The patient should continue with the present  medications. Patientmanagement should include aggressive medical therapy  and resumption of all previous activities in 2 days.      MEDICATIONS  (STANDING):  artificial  tears Solution 1 Drop(s) Both EYES every 12 hours  aspirin enteric coated 81 milliGRAM(s) Oral daily  calcium acetate 667 milliGRAM(s) Oral three times a day with meals  carvedilol 12.5 milliGRAM(s) Oral every 12 hours  ceFAZolin   IVPB 2000 milliGRAM(s) IV Intermittent once  cloNIDine 0.2 milliGRAM(s) Oral two times a day  colchicine 0.6 milliGRAM(s) Oral two times a day  dextrose 5%. 1000 milliLiter(s) (50 mL/Hr) IV Continuous <Continuous>  dextrose 50% Injectable 12.5 Gram(s) IV Push once  dextrose 50% Injectable 25 Gram(s) IV Push once  dextrose 50% Injectable 25 Gram(s) IV Push once  epoetin gian Injectable 39802 Unit(s) IV Push <User Schedule>  ergocalciferol 19598 Unit(s) Oral every week  hydrALAZINE 50 milliGRAM(s) Oral every 8 hours  insulin lispro (HumaLOG) corrective regimen sliding scale   SubCutaneous three times a day before meals  insulin lispro (HumaLOG) corrective regimen sliding scale   SubCutaneous at bedtime  metoclopramide Injectable 10 milliGRAM(s) IV Push every 12 hours  pantoprazole    Tablet 40 milliGRAM(s) Oral two times a day  simvastatin 40 milliGRAM(s) Oral at bedtime    MEDICATIONS  (PRN):  acetaminophen   Tablet 650 milliGRAM(s) Oral every 6 hours PRN For Temp greater than 38 C (100.4 F)  acetaminophen   Tablet. 650 milliGRAM(s) Oral every 6 hours PRN Moderate Pain (4 - 6)  dextrose Gel 1 Dose(s) Oral once PRN Blood Glucose LESS THAN 70 milliGRAM(s)/deciliter  glucagon  Injectable 1 milliGRAM(s) IntraMuscular once PRN Glucose LESS THAN 70 milligrams/deciliter  hydrALAZINE Injectable 20 milliGRAM(s) IV Push every 6 hours PRN SBP > 160  ondansetron Injectable 4 milliGRAM(s) IV Push every 6 hours PRN Nausea

## 2018-03-28 NOTE — PROGRESS NOTE ADULT - ASSESSMENT
57 y.o. F with hx of HTN, HLD, DMII, hyperkalemia, ckd 3 who presented with 3 weeks of postprandial vomiting and abd pain, noted to have ARF, fluid overload with bilateral pleural effusions and pericardial effusion as well as symptomatic anemia likely multifactorial secondary to acute on chronic renal failure and suspected upper GI Bleeding from vomiting/retching and NSAID use. Clinical concern that worsening acute on chronic renal failure possibly caused uremia related sx of N/V, with patients NSAID use and retching suspected GI bleed but occult blood returned negative, for dropping h/h pt received 2 u prbc on admission with appropriate response to therapy. 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 In the interim while being dialyzed the patient had an episode of PAF, s/p cardizem became rate controlled and remained in NSR. EP consulted. Unable to AC the patient due to initial concern for GI bleeding, for which GI consulted and tentatively GI intervention on 03/29/2018, cleared by cardio on 03/25/218. 2 days ago presented right arm ecchymosis on the cath side. Us of right arm reported superficial basilic vein distal thrombus but no didn't report DVT, pseudoaneurysm or hematoma    S/p R thoracocentesis on 3/20 with 1100 cc removed  S/P pleurocentesis done 3/21 on left sided with removal of 1 L. Fluid analysis consistent with transudate   s/p cath on 03/24/2018, NEG, EF 55%  tentatively GI intervention (EGD) on 03/29/2018  cleared by cardio on 03/25/218   and A/V fistula on 03/28/2018 as per vascular surgery

## 2018-03-28 NOTE — PROGRESS NOTE ADULT - ASSESSMENT
Ms. Elizabeth is a 56 yo [place]ic speaking female with hx of hypertension, hyperlipidemia, type 2 diabetes, with 1 month hx of vomiting and abdominal pain, with NSAID use.  Developed pleuritic chest pain, worsened in supine position and with deep breaths - being treated for uremic pericarditis, on colchicine.  She had bilateral pleural effusions, underwent IR guided thoracentesis with resolution of shortness of breath.  She underwent cardiac catheterization which did not demonstrate obstructive CAD.  Biventricular systolic function is preserved.       1. pleural effusions - s/p bilateral thoracentesis (1L each side), breathing much improved.    2. abdominal pain/nausea/vomiting- no further symptoms, awaiting endoscopy  3. uremic pericarditis- pain well controlled, continue colchicine for a total of 4 weeks.   4. anemia - may be due to chronic disease,  GI workup pending.  CKD  Keep Hb > 9.    5 hypertension- well controlled, continue carvedilol, hydralazine, clonidine, lasix.  Uptitrate as needed.    6. moderate pericardial effusion - hemodynamically stable, chronic. No further interventions warranted.    7. acute on chronic renal insufficiency -   HD per renal  8. paroxysmal atrial fibrillation - newly diagnosed; no AC for now given anemia, will need GI input to assess bleeding risk on AC; may not tolerate prolonged AC given anemia - possibly due to kidney disease.  Appreciate EP input regarding KELLI closure candidacy. Will perform eventual GERALD  to assess KELLI anatomy and if suitable for KELLI closure (Watchman).      Thank you for allowing me to participate in care of your patient.   Will follow

## 2018-03-28 NOTE — PROGRESS NOTE ADULT - PROBLEM SELECTOR PLAN 6
HBA1C: 7.6   continue sliding scale. 4 UI required in 24 hrs. Will consider other coverage if needed  -c/w Accu-Cheks ac hs tid   -c/w hypoglycemia protocol

## 2018-03-28 NOTE — PROGRESS NOTE ADULT - SUBJECTIVE AND OBJECTIVE BOX
[facility]                                                        Office: [address] [address]                                                       Telephone: [phone]. Fax:[phone]      CARDIOLOGY PROGRESS NOTE   ([facility])    Subjective:   CURRENT MEDICATIONS  carvedilol 12.5 milliGRAM(s) Oral every 12 hours  cloNIDine 0.2 milliGRAM(s) Oral two times a day  hydrALAZINE 50 milliGRAM(s) Oral every 8 hours  hydrALAZINE Injectable 20 milliGRAM(s) IV Push every 6 hours PRN    ceFAZolin   IVPB 2000 milliGRAM(s) IV Intermittent once      acetaminophen   Tablet 650 milliGRAM(s) Oral every 6 hours PRN  acetaminophen   Tablet. 650 milliGRAM(s) Oral every 6 hours PRN  metoclopramide Injectable 10 milliGRAM(s) IV Push every 12 hours  ondansetron Injectable 4 milliGRAM(s) IV Push every 6 hours PRN    docusate sodium 100 milliGRAM(s) Oral daily  pantoprazole    Tablet 40 milliGRAM(s) Oral two times a day  senna 2 Tablet(s) Oral at bedtime    colchicine 0.6 milliGRAM(s) Oral two times a day  dextrose 50% Injectable 12.5 Gram(s) IV Push once  dextrose 50% Injectable 25 Gram(s) IV Push once  dextrose 50% Injectable 25 Gram(s) IV Push once  dextrose Gel 1 Dose(s) Oral once PRN  glucagon  Injectable 1 milliGRAM(s) IntraMuscular once PRN  insulin lispro (HumaLOG) corrective regimen sliding scale   SubCutaneous three times a day before meals  insulin lispro (HumaLOG) corrective regimen sliding scale   SubCutaneous at bedtime  simvastatin 40 milliGRAM(s) Oral at bedtime    artificial  tears Solution 1 Drop(s) Both EYES every 12 hours  aspirin enteric coated 81 milliGRAM(s) Oral daily  calcium acetate 667 milliGRAM(s) Oral three times a day with meals  dextrose 5%. 1000 milliLiter(s) IV Continuous <Continuous>  epoetin gian Injectable 00489 Unit(s) IV Push <User Schedule>  ergocalciferol 60685 Unit(s) Oral every week  heparin  Injectable 5000 Unit(s) SubCutaneous every 12 hours    	  TELEMETRY:   Vitals:  T(C): 36.3 (03-28-18 @ 06:57), Max: 36.8 (03-27-18 @ 14:31)  HR: 73 (03-28-18 @ 06:57) (70 - 84)  BP: 168/86 (03-28-18 @ 06:57) (152/74 - 171/79)  RR: 18 (03-28-18 @ 06:57) (17 - 18)  SpO2: 92% (03-28-18 @ 06:57) (92% - 97%)  Wt(kg): --  I&O's Summary    27 Mar 2018 07:01  -  28 Mar 2018 07:00  --------------------------------------------------------  IN: 1088 mL / OUT: 1901 mL / NET: -813 mL      Height (cm): 165.1 (03-28 @ 08:54)  Daily T(C): 36.3 (03-28-18 @ 06:57), Max: 36.8 (03-27-18 @ 14:31)  HR: 73 (03-28-18 @ 06:57) (70 - 84)  BP: 168/86 (03-28-18 @ 06:57) (152/74 - 171/79)  RR: 18 (03-28-18 @ 06:57) (17 - 18)  SpO2: 92% (03-28-18 @ 06:57) (92% - 97%)  Wt(kg): --    Daily Height (cm): 165.1 (03-28 @ 08:54)    PHYSICAL EXAM:  Appearance: Normal	  HEENT:   Normal oral mucosa, PERRL, EOMI	  Lymphatic: No lymphadenopathy  Cardiovascular: Normal S1 S2, No JVD, No murmurs, No edema  Respiratory: Lungs clear to auscultation	  Psychiatry: A & O x 3, Mood & affect appropriate  Gastrointestinal:  Soft, Non-tender, + BS	  Skin: No rashes, No ecchymoses, No cyanosis  Neurologic: Non-focal  Extremities: Normal range of motion, No clubbing, cyanosis or edema  Vascular: Peripheral pulses palpable 2+ bilaterally      ECG (tracing reviewed by me):  	    DIAGNOSTIC TESTING:  Echocardiogram (images reviewed by me):  Catheterization:  Stress Test:    CXR (image reviewed by me):  OTHER: 	    LABS:	 	  CARDIAC MARKERS:                                  7.7    6.7   )-----------( 396      ( 27 Mar 2018 08:26 )             24.1     03-27    132<L>  |  94<L>  |  18.0  ----------------------------<  171<H>  3.7   |  25.0  |  3.62<H>    Ca    7.9<L>      27 Mar 2018 08:26      BNP:   Lipid Profile:   HgA1c:   TSH: [facility]                                                        Office: [address] [place] [address]                                                       Telephone: [phone]. Fax:[phone]      CARDIOLOGY PROGRESS NOTE   ([facility])    Subjective: Patient seen and examined at bedside.  Denies chest pain, shortness of breath, palpitations, dizziness, nausea, bleeding. She feels well.       CURRENT MEDICATIONS  carvedilol 12.5 milliGRAM(s) Oral every 12 hours  cloNIDine 0.2 milliGRAM(s) Oral two times a day  hydrALAZINE 50 milliGRAM(s) Oral every 8 hours  hydrALAZINE Injectable 20 milliGRAM(s) IV Push every 6 hours PRN  ceFAZolin   IVPB 2000 milliGRAM(s) IV Intermittent once  acetaminophen   Tablet 650 milliGRAM(s) Oral every 6 hours PRN  acetaminophen   Tablet. 650 milliGRAM(s) Oral every 6 hours PRN  metoclopramide Injectable 10 milliGRAM(s) IV Push every 12 hours  ondansetron Injectable 4 milliGRAM(s) IV Push every 6 hours PRN  docusate sodium 100 milliGRAM(s) Oral daily  pantoprazole    Tablet 40 milliGRAM(s) Oral two times a day  senna 2 Tablet(s) Oral at bedtime  colchicine 0.6 milliGRAM(s) Oral two times a day  dextrose 50% Injectable 12.5 Gram(s) IV Push once  dextrose 50% Injectable 25 Gram(s) IV Push once  dextrose 50% Injectable 25 Gram(s) IV Push once  dextrose Gel 1 Dose(s) Oral once PRN  glucagon  Injectable 1 milliGRAM(s) IntraMuscular once PRN  insulin lispro (HumaLOG) corrective regimen sliding scale   SubCutaneous three times a day before meals  insulin lispro (HumaLOG) corrective regimen sliding scale   SubCutaneous at bedtime  simvastatin 40 milliGRAM(s) Oral at bedtime  artificial  tears Solution 1 Drop(s) Both EYES every 12 hours  aspirin enteric coated 81 milliGRAM(s) Oral daily  calcium acetate 667 milliGRAM(s) Oral three times a day with meals  dextrose 5%. 1000 milliLiter(s) IV Continuous <Continuous>  epoetin gian Injectable 03428 Unit(s) IV Push <User Schedule>  ergocalciferol 80220 Unit(s) Oral every week  heparin  Injectable 5000 Unit(s) SubCutaneous every 12 hours  	  TELEMETRY: n/a  Vitals:  I&O's Summary    27 Mar 2018 07:01  -  28 Mar 2018 07:00  --------------------------------------------------------  IN: 1088 mL / OUT: 1901 mL / NET: -813 mL      Height (cm): 165.1 ( @ 08:54)  Daily T(C): 36.3 (18 @ 06:57), Max: 36.8 (18 @ 14:31)  HR: 73 (18 @ 06:57) (70 - 84)  BP: 168/86 (18 @ 06:57) (152/74 - 171/79)  RR: 18 (18 @ 06:57) (17 - 18)  SpO2: 92% (18 @ 06:57) (92% - 97%)  Wt(kg): --    Daily Height (cm): 165.1 ( @ 08:54)    PHYSICAL EXAM:  Appearance: Normal	  HEENT:   Normal oral mucosa, PERRL, EOMI	  Lymphatic: No lymphadenopathy  Cardiovascular: Normal S1 S2, No JVD, No murmurs, No edema  Respiratory: bibasilar crackles  Psychiatry: A & O x 3, Mood & affect appropriate  Gastrointestinal:  Soft, Non-tender, + BS	  Skin: No rashes, No cyanosis, R arm ecchymoses  Neurologic: Non-focal  Extremities: Normal range of motion, No clubbing, cyanosis or edema  Vascular: Peripheral pulses palpable 2+ bilaterally    DIAGNOSTIC TESTIN.7    6.7   )-----------( 396      ( 27 Mar 2018 08:26 )             24.1         132<L>  |  94<L>  |  18.0  ----------------------------<  171<H>  3.7   |  25.0  |  3.62<H>    Ca    7.9<L>      27 Mar 2018 08:26

## 2018-03-28 NOTE — PROGRESS NOTE ADULT - SUBJECTIVE AND OBJECTIVE BOX
Pt seen and examined f/u prior nausea and vomiting with episode of hematemesis PTA  This morning she has no complaints of abdominal pain, nausea or vomiting. Hgb down to 7.7 yesterday. Going for AV fisula today. ?HD again today which would be preferable if patient is to undergo EGD in AM.    REVIEW OF SYSTEMS:    CONSTITUTIONAL: No fever, weight loss, or fatigue  EYES: No eye pain, visual disturbances, or discharge  ENMT:  No difficulty hearing, tinnitus, vertigo; No sinus or throat pain  RESPIRATORY: No cough, wheezing, chills or hemoptysis; No shortness of breath  CARDIOVASCULAR: No chest pain, palpitations, dizziness, or leg swelling  GASTROINTESTINAL: No abdominal or epigastric pain. No nausea, vomiting, or hematemesis; No diarrhea or constipation. No melena or hematochezia.    MEDICATIONS:  MEDICATIONS  (STANDING):  artificial  tears Solution 1 Drop(s) Both EYES every 12 hours  aspirin enteric coated 81 milliGRAM(s) Oral daily  calcium acetate 667 milliGRAM(s) Oral three times a day with meals  carvedilol 12.5 milliGRAM(s) Oral every 12 hours  ceFAZolin   IVPB 2000 milliGRAM(s) IV Intermittent once  cloNIDine 0.2 milliGRAM(s) Oral two times a day  colchicine 0.6 milliGRAM(s) Oral two times a day  dextrose 5%. 1000 milliLiter(s) (50 mL/Hr) IV Continuous <Continuous>  dextrose 50% Injectable 12.5 Gram(s) IV Push once  dextrose 50% Injectable 25 Gram(s) IV Push once  dextrose 50% Injectable 25 Gram(s) IV Push once  docusate sodium 100 milliGRAM(s) Oral daily  epoetin gian Injectable 67346 Unit(s) IV Push <User Schedule>  ergocalciferol 95205 Unit(s) Oral every week  heparin  Injectable 5000 Unit(s) SubCutaneous every 12 hours  hydrALAZINE 50 milliGRAM(s) Oral every 8 hours  insulin lispro (HumaLOG) corrective regimen sliding scale   SubCutaneous three times a day before meals  insulin lispro (HumaLOG) corrective regimen sliding scale   SubCutaneous at bedtime  metoclopramide Injectable 10 milliGRAM(s) IV Push every 12 hours  pantoprazole    Tablet 40 milliGRAM(s) Oral two times a day  senna 2 Tablet(s) Oral at bedtime  simvastatin 40 milliGRAM(s) Oral at bedtime    MEDICATIONS  (PRN):  acetaminophen   Tablet 650 milliGRAM(s) Oral every 6 hours PRN For Temp greater than 38 C (100.4 F)  acetaminophen   Tablet. 650 milliGRAM(s) Oral every 6 hours PRN Moderate Pain (4 - 6)  dextrose Gel 1 Dose(s) Oral once PRN Blood Glucose LESS THAN 70 milliGRAM(s)/deciliter  glucagon  Injectable 1 milliGRAM(s) IntraMuscular once PRN Glucose LESS THAN 70 milligrams/deciliter  hydrALAZINE Injectable 20 milliGRAM(s) IV Push every 6 hours PRN SBP > 160  ondansetron Injectable 4 milliGRAM(s) IV Push every 6 hours PRN Nausea      Allergies    No Known Allergies    Intolerances        Vital Signs Last 24 Hrs  T(C): 36.8 (27 Mar 2018 23:22), Max: 36.8 (27 Mar 2018 14:31)  T(F): 98.2 (27 Mar 2018 23:22), Max: 98.3 (27 Mar 2018 14:31)  HR: 84 (28 Mar 2018 05:51) (70 - 84)  BP: 159/83 (28 Mar 2018 05:51) (152/74 - 171/79)  BP(mean): --  RR: 18 (27 Mar 2018 23:22) (17 - 18)  SpO2: 96% (27 Mar 2018 23:22) (94% - 97%)    03-27 @ 07:01  -  03-28 @ 07:00  --------------------------------------------------------  IN: 1088 mL / OUT: 1901 mL / NET: -813 mL        PHYSICAL EXAM:    General: Well developed; well nourished; in no acute distress  HEENT: MMM, conjunctiva and sclera clear  Gastrointestinal:Abdomen: Soft non-tender non-distended; Normal bowel sounds; No hepatosplenomegaly  Extremities: no cyanosis, clubbing or edema.  Skin: Warm and dry. No obvious rash    LABS:      CBC Full  -  ( 27 Mar 2018 08:26 )  WBC Count : 6.7 K/uL  Hemoglobin : 7.7 g/dL  Hematocrit : 24.1 %  Platelet Count - Automated : 396 K/uL  Mean Cell Volume : 88.0 fl  Mean Cell Hemoglobin : 28.1 pg  Mean Cell Hemoglobin Concentration : 32.0 g/dL  Auto Neutrophil # : 4.6 K/uL  Auto Lymphocyte # : 0.9 K/uL  Auto Monocyte # : 0.9 K/uL  Auto Eosinophil # : 0.2 K/uL  Auto Basophil # : 0.0 K/uL  Auto Neutrophil % : 69.0 %  Auto Lymphocyte % : 13.8 %  Auto Monocyte % : 13.6 %  Auto Eosinophil % : 3.0 %  Auto Basophil % : 0.3 %    03-27    132<L>  |  94<L>  |  18.0  ----------------------------<  171<H>  3.7   |  25.0  |  3.62<H>    Ca    7.9<L>      27 Mar 2018 08:26

## 2018-03-28 NOTE — PROGRESS NOTE ADULT - PROBLEM SELECTOR PLAN 2
baseline CKD 3 now progressed to ESRD requiring HD: with uremic pericarditis requiring dialysis as mentioned above (c/w tx with colchicine bid for a total of 4 weeks which will end on 4/5)  -c/w standard HD sessions   -Acute hepatitis panel negative; ppd at 72 hours 0mm negative  -Permacath on right side, neck. Placed  03/22/2018  -planning on AVF/AVG, tentative on 03/28/2018 by vascular  -EPO weekly added on 03/22/18 per nephro

## 2018-03-28 NOTE — PROGRESS NOTE ADULT - PROBLEM SELECTOR PLAN 7
Resolved, now mildly constipated and im,mproving. Will adjust stool softeners Resolved, now mildly constipated and improving. Will adjust stool softeners

## 2018-03-28 NOTE — PROGRESS NOTE ADULT - ATTENDING COMMENTS
Note addended where needed. Plan discussed with patient at bedside with [place] speaking resident   Plan for egd friday, HD thursday and GERALD monday (likely). D/w Cardiology Dr Salazar

## 2018-03-28 NOTE — PROGRESS NOTE ADULT - PROBLEM SELECTOR PLAN 3
likely 2/2 superficial thrombus  Us of right arm reported superficial basilic vein distal thrombus but no DVT, pseudoaneurysm or hematoma  elevation and compresses likely 2/2 superficial thrombus  Us of right arm reported superficial basilic vein distal thrombus   elevation and compresses

## 2018-03-28 NOTE — PROGRESS NOTE ADULT - SUBJECTIVE AND OBJECTIVE BOX
POST OPERATIVE DAY #: 0    SUBJECTIVE: Pt seen and examined at bedside.   SOB:  [ ] YES [X ] NO  Chest Discomfort: [ ] YES [ X] NO    Nausea: [ ] YES [ X] NO           Vomiting: [ ] YES X[ ] NO  Flatus: [ X] YES [ ] NO             Bowel Movement: [ ] YES [X ] NO  Diarrhea: [ ] YES [X ] NO         Void: [X ]YES [ ]No  Constipation: [ ] YES [X] NO     Pain (0-10):          3    Pain Control Adequate: [X ] YES [ ] NO      Vital Signs Last 24 Hrs  T(C): 36.3 (28 Mar 2018 19:10), Max: 36.8 (27 Mar 2018 22:10)  T(F): 97.3 (28 Mar 2018 19:10), Max: 98.2 (27 Mar 2018 22:10)  HR: 67 (28 Mar 2018 19:10) (62 - 84)  BP: 151/88 (28 Mar 2018 19:10) (110/68 - 168/86)  BP(mean): 87 (28 Mar 2018 17:00) (87 - 92)  RR: 17 (28 Mar 2018 19:10) (13 - 23)  SpO2: 93% (28 Mar 2018 19:10) (92% - 96%)  I&O's Summary    27 Mar 2018 07:01  -  28 Mar 2018 07:00  --------------------------------------------------------  IN: 1088 mL / OUT: 1901 mL / NET: -813 mL      I&O's Detail    27 Mar 2018 07:01  -  28 Mar 2018 07:00  --------------------------------------------------------  IN:    Oral Fluid: 800 mL    Packed Red Blood Cells: 288 mL  Total IN: 1088 mL    OUT:    Other: 1900 mL    Voided: 1 mL  Total OUT: 1901 mL    Total NET: -813 mL                  PHYSICAL EXAM:      Constitutional: NAD, alert and oriented     Respiratory: CTABL     Cardiovascular: s1s2, RRR    Gastrointestinal: soft, NT,ND,+BS, no guarding no rebound     Extremities: LUE dressing C/D/I good thrill, distal pulses 1+ , SILT,       A/P: 57y s/p Arteriovenous anastomosis of upper extremity  03/28/2018  AVG,  Brachial artery to Axilary vein   - Diet: ADAT  - Activity: as tolerated   - Labs: AM  - Pain medication  - DVT ppx

## 2018-03-28 NOTE — PROGRESS NOTE ADULT - SUBJECTIVE AND OBJECTIVE BOX
NEPHROLOGY INTERVAL HPI/OVERNIGHT EVENTS:  pt clinically stable  no acute distress  no cp, sob, n/v/d    MEDICATIONS  (STANDING):  artificial  tears Solution 1 Drop(s) Both EYES every 12 hours  aspirin enteric coated 81 milliGRAM(s) Oral daily  calcium acetate 667 milliGRAM(s) Oral three times a day with meals  carvedilol 12.5 milliGRAM(s) Oral every 12 hours  ceFAZolin   IVPB 2000 milliGRAM(s) IV Intermittent once  cloNIDine 0.2 milliGRAM(s) Oral two times a day  colchicine 0.6 milliGRAM(s) Oral two times a day  dextrose 5%. 1000 milliLiter(s) (50 mL/Hr) IV Continuous <Continuous>  dextrose 50% Injectable 12.5 Gram(s) IV Push once  dextrose 50% Injectable 25 Gram(s) IV Push once  dextrose 50% Injectable 25 Gram(s) IV Push once  docusate sodium 100 milliGRAM(s) Oral daily  epoetin gian Injectable 18328 Unit(s) IV Push <User Schedule>  ergocalciferol 45006 Unit(s) Oral every week  heparin  Injectable 5000 Unit(s) SubCutaneous every 12 hours  hydrALAZINE 50 milliGRAM(s) Oral every 8 hours  insulin lispro (HumaLOG) corrective regimen sliding scale   SubCutaneous three times a day before meals  insulin lispro (HumaLOG) corrective regimen sliding scale   SubCutaneous at bedtime  metoclopramide Injectable 10 milliGRAM(s) IV Push every 12 hours  pantoprazole    Tablet 40 milliGRAM(s) Oral two times a day  senna 2 Tablet(s) Oral at bedtime  simvastatin 40 milliGRAM(s) Oral at bedtime    MEDICATIONS  (PRN):  acetaminophen   Tablet 650 milliGRAM(s) Oral every 6 hours PRN For Temp greater than 38 C (100.4 F)  acetaminophen   Tablet. 650 milliGRAM(s) Oral every 6 hours PRN Moderate Pain (4 - 6)  dextrose Gel 1 Dose(s) Oral once PRN Blood Glucose LESS THAN 70 milliGRAM(s)/deciliter  glucagon  Injectable 1 milliGRAM(s) IntraMuscular once PRN Glucose LESS THAN 70 milligrams/deciliter  hydrALAZINE Injectable 20 milliGRAM(s) IV Push every 6 hours PRN SBP > 160  ondansetron Injectable 4 milliGRAM(s) IV Push every 6 hours PRN Nausea      Allergies    No Known Allergies    Intolerances      Vital Signs Last 24 Hrs  T(C): 36.3 (28 Mar 2018 06:57), Max: 36.8 (27 Mar 2018 14:31)  T(F): 97.3 (28 Mar 2018 06:57), Max: 98.3 (27 Mar 2018 14:31)  HR: 73 (28 Mar 2018 06:57) (70 - 84)  BP: 168/86 (28 Mar 2018 06:57) (152/74 - 171/79)  BP(mean): --  RR: 18 (28 Mar 2018 06:57) (17 - 18)  SpO2: 92% (28 Mar 2018 06:57) (92% - 97%)    PHYSICAL EXAM:  GENERAL: NAD  NECK: Supple, No JVD  NERVOUS SYSTEM:  Alert & Oriented X3,  intact and symmetric  CHEST/LUNG: Clear bilaterally   HEART: Regular rate and rhythm; No rub  ABDOMEN: Soft, +BS  EXTREMITIES:  2+ Peripheral Pulses, tr edema  SKIN: No rashes or lesions    LABS:                        7.7    6.7   )-----------( 396      ( 27 Mar 2018 08:26 )             24.1   Hemoglobin: 8.4 g/dL (03.26.18 @ 05:52)      03-27    132<L>  |  94<L>  |  18.0  ----------------------------<  171<H>  3.7   |  25.0  |  3.62<H>    Ca    7.9<L>      27 Mar 2018 08:26      PT/INR - ( 28 Mar 2018 08:44 )   PT: 12.4 sec;   INR: 1.12 ratio       Phosphorus Level, Serum in AM (03.26.18 @ 05:52)    Phosphorus Level, Serum: 1.6 mg/dL              RADIOLOGY & ADDITIONAL TESTS:

## 2018-03-29 ENCOUNTER — TRANSCRIPTION ENCOUNTER (OUTPATIENT)
Age: 58
End: 2018-03-29

## 2018-03-29 LAB
ANION GAP SERPL CALC-SCNC: 13 MMOL/L — SIGNIFICANT CHANGE UP (ref 5–17)
BLD GP AB SCN SERPL QL: SIGNIFICANT CHANGE UP
BUN SERPL-MCNC: 31 MG/DL — HIGH (ref 8–20)
CALCIUM SERPL-MCNC: 8.2 MG/DL — LOW (ref 8.6–10.2)
CHLORIDE SERPL-SCNC: 89 MMOL/L — LOW (ref 98–107)
CO2 SERPL-SCNC: 25 MMOL/L — SIGNIFICANT CHANGE UP (ref 22–29)
CREAT SERPL-MCNC: 4.15 MG/DL — HIGH (ref 0.5–1.3)
FERRITIN SERPL-MCNC: 854 NG/ML — HIGH (ref 11–306)
FOLATE SERPL-MCNC: 7.3 NG/ML — SIGNIFICANT CHANGE UP (ref 4–16)
GLUCOSE BLDC GLUCOMTR-MCNC: 190 MG/DL — HIGH (ref 70–99)
GLUCOSE BLDC GLUCOMTR-MCNC: 192 MG/DL — HIGH (ref 70–99)
GLUCOSE BLDC GLUCOMTR-MCNC: 227 MG/DL — HIGH (ref 70–99)
GLUCOSE BLDC GLUCOMTR-MCNC: 237 MG/DL — HIGH (ref 70–99)
GLUCOSE SERPL-MCNC: 242 MG/DL — HIGH (ref 70–115)
HCT VFR BLD CALC: 29.2 % — LOW (ref 37–47)
HGB BLD-MCNC: 9.3 G/DL — LOW (ref 12–16)
IRON SATN MFR SERPL: 21 % — SIGNIFICANT CHANGE UP (ref 14–50)
IRON SATN MFR SERPL: 38 UG/DL — SIGNIFICANT CHANGE UP (ref 37–145)
MCHC RBC-ENTMCNC: 27.8 PG — SIGNIFICANT CHANGE UP (ref 27–31)
MCHC RBC-ENTMCNC: 31.8 G/DL — LOW (ref 32–36)
MCV RBC AUTO: 87.2 FL — SIGNIFICANT CHANGE UP (ref 81–99)
PLATELET # BLD AUTO: 475 K/UL — HIGH (ref 150–400)
POTASSIUM SERPL-MCNC: 4.9 MMOL/L — SIGNIFICANT CHANGE UP (ref 3.5–5.3)
POTASSIUM SERPL-SCNC: 4.9 MMOL/L — SIGNIFICANT CHANGE UP (ref 3.5–5.3)
RBC # BLD: 3.35 M/UL — LOW (ref 4.4–5.2)
RBC # FLD: 14.5 % — SIGNIFICANT CHANGE UP (ref 11–15.6)
SODIUM SERPL-SCNC: 127 MMOL/L — LOW (ref 135–145)
TIBC SERPL-MCNC: 182 UG/DL — LOW (ref 220–430)
TRANSFERRIN SERPL-MCNC: 127 MG/DL — LOW (ref 192–382)
TYPE + AB SCN PNL BLD: SIGNIFICANT CHANGE UP
WBC # BLD: 9.3 K/UL — SIGNIFICANT CHANGE UP (ref 4.8–10.8)
WBC # FLD AUTO: 9.3 K/UL — SIGNIFICANT CHANGE UP (ref 4.8–10.8)

## 2018-03-29 PROCEDURE — 99232 SBSQ HOSP IP/OBS MODERATE 35: CPT

## 2018-03-29 PROCEDURE — 99233 SBSQ HOSP IP/OBS HIGH 50: CPT | Mod: GC

## 2018-03-29 RX ADMIN — HEPARIN SODIUM 5000 UNIT(S): 5000 INJECTION INTRAVENOUS; SUBCUTANEOUS at 06:02

## 2018-03-29 RX ADMIN — CARVEDILOL PHOSPHATE 12.5 MILLIGRAM(S): 80 CAPSULE, EXTENDED RELEASE ORAL at 06:01

## 2018-03-29 RX ADMIN — Medication 1: at 17:08

## 2018-03-29 RX ADMIN — Medication 0.6 MILLIGRAM(S): at 06:01

## 2018-03-29 RX ADMIN — Medication 650 MILLIGRAM(S): at 22:04

## 2018-03-29 RX ADMIN — Medication 50 MILLIGRAM(S): at 15:05

## 2018-03-29 RX ADMIN — ERGOCALCIFEROL 50000 UNIT(S): 1.25 CAPSULE ORAL at 17:09

## 2018-03-29 RX ADMIN — Medication 50 MILLIGRAM(S): at 06:01

## 2018-03-29 RX ADMIN — CARVEDILOL PHOSPHATE 12.5 MILLIGRAM(S): 80 CAPSULE, EXTENDED RELEASE ORAL at 17:09

## 2018-03-29 RX ADMIN — SENNA PLUS 2 TABLET(S): 8.6 TABLET ORAL at 22:04

## 2018-03-29 RX ADMIN — Medication 2: at 08:02

## 2018-03-29 RX ADMIN — Medication 650 MILLIGRAM(S): at 22:40

## 2018-03-29 RX ADMIN — Medication 0.2 MILLIGRAM(S): at 06:01

## 2018-03-29 RX ADMIN — Medication 1 DROP(S): at 17:08

## 2018-03-29 RX ADMIN — Medication 0.2 MILLIGRAM(S): at 17:09

## 2018-03-29 RX ADMIN — Medication 50 MILLIGRAM(S): at 22:04

## 2018-03-29 RX ADMIN — Medication 10 MILLIGRAM(S): at 17:09

## 2018-03-29 RX ADMIN — ERYTHROPOIETIN 10000 UNIT(S): 10000 INJECTION, SOLUTION INTRAVENOUS; SUBCUTANEOUS at 11:11

## 2018-03-29 RX ADMIN — Medication 81 MILLIGRAM(S): at 17:09

## 2018-03-29 RX ADMIN — Medication 100 MILLIGRAM(S): at 17:09

## 2018-03-29 RX ADMIN — Medication 10 MILLIGRAM(S): at 06:02

## 2018-03-29 RX ADMIN — Medication 1: at 11:56

## 2018-03-29 RX ADMIN — Medication 1 DROP(S): at 06:01

## 2018-03-29 RX ADMIN — SIMVASTATIN 40 MILLIGRAM(S): 20 TABLET, FILM COATED ORAL at 22:04

## 2018-03-29 RX ADMIN — HEPARIN SODIUM 5000 UNIT(S): 5000 INJECTION INTRAVENOUS; SUBCUTANEOUS at 17:09

## 2018-03-29 RX ADMIN — TUBERCULIN PURIFIED PROTEIN DERIVATIVE 5 UNIT(S): 5 INJECTION, SOLUTION INTRADERMAL at 07:36

## 2018-03-29 RX ADMIN — Medication 0.6 MILLIGRAM(S): at 17:09

## 2018-03-29 NOTE — PROGRESS NOTE ADULT - PROBLEM SELECTOR PLAN 6
HBA1C: 7.6   continue sliding scale. 4 UI required in 24 hrs. Will consider other coverage if needed  -c/w Accu-Cheks ac hs tid   -c/w hypoglycemia protocol HBA1C: 7.6   continue sliding scale. 2 UI required in 24 hrs. Will consider other coverage if needed  -c/w Accu-Cheks ac hs tid   -c/w hypoglycemia protocol

## 2018-03-29 NOTE — PROGRESS NOTE ADULT - ASSESSMENT
CRF (V): +DM  Fluid overload (pleural and pericardial effusions)==> resolved  - HD TTS  - await maturation of AVF    Anemia: r/o GI blood loss  - MARGI at HD  - trend H/H (target Hgb=10.0)  - PRBCs as needed  - GI to schedule colonoscopy(? tomorrow)    RO: hypophosphatemia  -  binders on hold  - cont Rocaltrol  - trend phosphorous CRF (V): +DM  Fluid overload (pleural and pericardial effusions)==> resolved  - HD today  - await maturation of AVF    Anemia: r/o GI blood loss  - MARGI at HD  - trend H/H (target Hgb=10.0)  - PRBCs as needed  - GI to schedule colonoscopy(? tomorrow)    RO: hypophosphatemia  -  binders on hold  - cont Rocaltrol  - trend phosphorous

## 2018-03-29 NOTE — PROGRESS NOTE ADULT - SUBJECTIVE AND OBJECTIVE BOX
Patient is a 57y old  Female who presents with a chief complaint of Nausea/vomiting and Chest pain (16 Mar 2018 17:20)    INTERVAL HPI/OVERNIGHT EVENTS:  Patient is a 56 y/o Female seen and examined at bedside. Patient underwent V/V fistula: Brachial artery to Axilary vein on 03/28/2018 on left arm.  She has right arm painful bruising since 4 days ago now markedly improved ipsilateral to catheterization from Friday.  LBM: 03/28/2018. HD schedule as per renal, today. and for EGD tomorrow.     ROS:  Patient denying CP, SOB, palpitations, leg swelling, lightheadedness/dizziness, paresthesias. All other ROS negative     Vital Signs Last 24 Hrs  T(C): 36.6 (29 Mar 2018 07:31), Max: 36.7 (28 Mar 2018 11:18)  T(F): 97.8 (29 Mar 2018 07:31), Max: 98.1 (28 Mar 2018 11:18)  HR: 69 (29 Mar 2018 07:31) (62 - 79)  BP: 139/79 (29 Mar 2018 07:31) (110/68 - 168/86)  BP(mean): 87 (28 Mar 2018 17:00) (87 - 92)  RR: 18 (29 Mar 2018 07:31) (13 - 23)  SpO2: 96% (29 Mar 2018 07:31) (93% - 96%)    Not on telemonitor      PHYSICAL EXAM:    GENERAL: NAD, well-groomed, well-developed.  HEENT: eomi, perrla, MMM  CHEST/LUNG: fair b/l ae. CTA BL no wheezing, no crackles noted  HEART: S1S2nl, no r/g noted now. + 3/6 SM. No pericardial rub noted   ABDOMEN: Soft, Nontender, Nondistended; Bowel sounds present  EXTREMITIES:  2+ Peripheral Pulses, No clubbing, cyanosis, or edema in b/l LE. RUE with swelling with medial upper arm ecchymosis, decreased in size from yesterday and fading. Pulses in right arm palpable with normal temp  Skin: right sided PC in place (neck). Left arm with surgical wound covered, no active bleeding or discharge noted, no tender to touch.  NERVOUS SYSTEM:  Alert & Oriented X3, Good concentration; Motor Strength 5/5 B/L upper and lower extremities; DTRs 2+ intact and symmetric    LABS:                 CAPILLARY BLOOD GLUCOSE      POCT Blood Glucose.: 279 mg/dL (28 Mar 2018 22:05)  POCT Blood Glucose.: 205 mg/dL (28 Mar 2018 17:18)  POCT Blood Glucose.: 186 mg/dL (28 Mar 2018 10:59)  POCT Blood Glucose.: 189 mg/dL (28 Mar 2018 07:58)                         8.9    6.8   )-----------( 402      ( 28 Mar 2018 10:06 )             28.0     03-28    131<L>  |  92<L>  |  18.0  ----------------------------<  192<H>  4.3   |  25.0  |  2.88<H>    Ca    8.4<L>      28 Mar 2018 10:06  Phos  1.8     03-28  Mg     1.7     03-28          Hemoglobin A1C, Whole Blood: 7.5 % (03-14 @ 11:12)    Culture Results:   No growth at 2 days.  Culture in progress (03-21 @ 11:30)  Culture Results:   No growth at 3 days.  Culture in progress (03-20 @ 12:48)  Culture Results:   <10,000 CFU/ml Gram Positive Cocci in Clusters  <10,000 CFU/ml Gram Positive Rods (03-17 @ 21:37)  Culture Results:   No growth at 5 days. (03-13 @ 22:13)  Culture Results:   No growth at 5 days. (03-13 @ 22:12)    RADIOLOGY & ADDITIONAL TESTS:       EXAM:  US DPLX [facility] [employer] RT                        PROCEDURE DATE:  03/25/2018    INTERPRETATION:  HISTORY: Right radial artery catheterization. Pain.   Pseudoaneurysm.  TECHNIQUE: Grey-scale and color-flow Doppler imaging   FINDINGS: No evidence of hematoma or pseudoaneurysm seen. Right radial   and ulnar arteries are patent.  IMPRESSION: No evidence of pseudoaneurysm  NISHA ZAVALA M.D., ATTENDING RADIOLOGIST  This document has been electronically signed. Mar 25 2018  4:24PM      EXAM:  US JOINT NONVASC EXT LTD RT                        PROCEDURE DATE:  03/25/2018    INTERPRETATION:  Clinical information: Worsening pain at site of IV   infiltration in medial aspect of right arm  Sonography of the right upper extremity was performed at the area of   clinical concern.  COMPARISON: None  FINDINGS/  IMPRESSION: No mass, hematoma or fluid collection is identified.  SYLVIE DAILEY M.D., ATTENDING RADIOLOGIST  This document has been electronically signed. Mar 25 2018  4:22PM           EXAM:  US DPLX UPR EXT VEINS LTD RT                        PROCEDURE DATE:  03/25/2018    INTERPRETATION:  Right upper extremity venous Doppler ultrasound   COMPARISON: None.  CLINICAL INFORMATION: Swelling, pain.  FINDINGS:  Right subclavian vein has limited visualization due to catheter placement  The right internal jugular, axillary, brachial, radial and ulnar veins show normal flow and are compressible where feasible, without evidence of DVT.  The cephalic vein is patent and compressible.  The basilic vein contains a focal thrombus within the mid forearm distally .  IMPRESSION:  Superficial basilic vein distal thrombus as described.  No evidence of upper extremity DVT. Incomplete evaluation of subclavian vein due to indwelling catheter.  NISHA ZAVALA M.D., ATTENDING RADIOLOGIST  This document has been electronically signed. Mar 25 2018  4:21PM      Referring Physician:  INDICATIONS: Acute diastolic congestive heart failure.  HISTORY: 58 yo woman with ESKD now on dialysis, admitted with worsening  SOB, orthopnea and anemia. She had bilateral pleural effusion and moderate  pericardial effusion. There was no prior cardiac history. The patient has  hypertension and dialysis-treated renal failure.  PROCEDURE:  --  Left heart catheterization with ventriculography.  --  Left coronary angiography.  --  Right coronary angiography.  TECHNIQUE: The risks and alternatives of the procedures and conscious  sedation were explained to the patient and informed consent was obtained.  Cardiac catheterization performed electively.  Local anesthetic given. Right radial artery access. Left heart  catheterization. Ventriculography was performed. Left coronary artery  angiography. The vessel was injected utilizing a catheter. Right coronary  artery angiography. The vessel was injected utilizing a catheter.  RADIATION EXPOSURE: 3 min.  CONTRAST GIVEN: Omnipaque 60 ml.  MEDICATIONS GIVEN: Verapamil (Isoptin, Calan, Covera), 5 mg, IA. Heparin,  3000 units, IA. 1% Lidocaine, 10 ml, subcutaneously.  VENTRICLES: There were no left ventricular global or regional wall motion  abnormalities. Global left ventricular function was normal. EF calculated  by contrast ventriculography was 55 %.  VALVES: AORTIC VALVE: No significant aortic valve gradient. MITRAL VALVE:  The mitral valve exhibited trivial regurgitation (less than 1+).  CORONARY VESSELS: The coronary circulation is right dominant.  LM:   --  LM: Normal. The vessel was normal sized, not calcified, and not  tortuous. Angiography showed no evidence of disease.  LAD:   --  LAD: Normal. The vessel was normal sized, not calcified, and not  tortuous. Angiography showed minor luminal irregularities with no flow  limiting lesions.  CX:   --  Circumflex: Normal. The vessel was normal sized, not calcified,  and excessively tortuous.Angiography showed no evidence of disease.  RCA:   --  RCA: Normal. The vessel was normal sized, not calcified, and  moderately tortuous. Angiography showed no evidence of disease.  COMPLICATIONS: There were no complications. No complications occurred  during the cath lab visit.  DIAGNOSTIC IMPRESSIONS: There is mild irregularity of the coronary anatomy.  Left ventricular function is normal (LVEF=55%).  DIAGNOSTIC RECOMMENDATIONS: The patient should continue with the present  medications. Patientmanagement should include aggressive medical therapy  and resumption of all previous activities in 2 days.      MEDICATIONS  (STANDING):  artificial  tears Solution 1 Drop(s) Both EYES every 12 hours  aspirin enteric coated 81 milliGRAM(s) Oral daily  calcium acetate 667 milliGRAM(s) Oral three times a day with meals  carvedilol 12.5 milliGRAM(s) Oral every 12 hours  ceFAZolin   IVPB 2000 milliGRAM(s) IV Intermittent once  cloNIDine 0.2 milliGRAM(s) Oral two times a day  colchicine 0.6 milliGRAM(s) Oral two times a day  dextrose 5%. 1000 milliLiter(s) (50 mL/Hr) IV Continuous <Continuous>  dextrose 50% Injectable 12.5 Gram(s) IV Push once  dextrose 50% Injectable 25 Gram(s) IV Push once  dextrose 50% Injectable 25 Gram(s) IV Push once  epoetin gian Injectable 35300 Unit(s) IV Push <User Schedule>  ergocalciferol 18752 Unit(s) Oral every week  hydrALAZINE 50 milliGRAM(s) Oral every 8 hours  insulin lispro (HumaLOG) corrective regimen sliding scale   SubCutaneous three times a day before meals  insulin lispro (HumaLOG) corrective regimen sliding scale   SubCutaneous at bedtime  metoclopramide Injectable 10 milliGRAM(s) IV Push every 12 hours  pantoprazole    Tablet 40 milliGRAM(s) Oral two times a day  simvastatin 40 milliGRAM(s) Oral at bedtime    MEDICATIONS  (PRN):  acetaminophen   Tablet 650 milliGRAM(s) Oral every 6 hours PRN For Temp greater than 38 C (100.4 F)  acetaminophen   Tablet. 650 milliGRAM(s) Oral every 6 hours PRN Moderate Pain (4 - 6)  dextrose Gel 1 Dose(s) Oral once PRN Blood Glucose LESS THAN 70 milliGRAM(s)/deciliter  glucagon  Injectable 1 milliGRAM(s) IntraMuscular once PRN Glucose LESS THAN 70 milligrams/deciliter  hydrALAZINE Injectable 20 milliGRAM(s) IV Push every 6 hours PRN SBP > 160  ondansetron Injectable 4 milliGRAM(s) IV Push every 6 hours PRN Nausea Patient is a 57y old  Female who presents with a chief complaint of Nausea/vomiting and Chest pain (16 Mar 2018 17:20)    INTERVAL HPI/OVERNIGHT EVENTS:  Patient is a 58 y/o Female seen and examined at bedside. Patient underwent V/V fistula: Brachial artery to Axilary vein on 03/28/2018 on left arm.  She has right arm painful bruising since 4 days ago now markedly improved ipsilateral to catheterization from Friday.  LBM: 03/28/2018. HD schedule as per renal, today. and for EGD tomorrow.     ROS:  Patient denying CP, SOB, palpitations, leg swelling, lightheadedness/dizziness, paresthesias. All other ROS negative     Vital Signs Last 24 Hrs  T(C): 36.6 (29 Mar 2018 07:31), Max: 36.7 (28 Mar 2018 11:18)  T(F): 97.8 (29 Mar 2018 07:31), Max: 98.1 (28 Mar 2018 11:18)  HR: 69 (29 Mar 2018 07:31) (62 - 79)  BP: 139/79 (29 Mar 2018 07:31) (110/68 - 168/86)  BP(mean): 87 (28 Mar 2018 17:00) (87 - 92)  RR: 18 (29 Mar 2018 07:31) (13 - 23)  SpO2: 96% (29 Mar 2018 07:31) (93% - 96%)    Not on telemonitor      PHYSICAL EXAM:    GENERAL: NAD, well-groomed, well-developed.  HEENT: eomi, perrla, MMM  CHEST/LUNG: fair b/l ae. CTA BL no wheezing, no crackles noted  HEART: S1S2nl, no r/g noted now. + 3/6 SM. No pericardial rub noted   ABDOMEN: Soft, Nontender, Nondistended; Bowel sounds present  EXTREMITIES:  2+ Peripheral Pulses, No clubbing, cyanosis, or edema in b/l LE. RUE with swelling with medial upper arm ecchymosis, decreased in size from yesterday and fading. Pulses in right arm palpable with normal temp  Skin: right sided PC in place (neck). Left arm with surgical wound covered, no active bleeding or discharge noted, no tender to touch.  NERVOUS SYSTEM:  Alert & Oriented X3, Good concentration; Motor Strength 5/5 B/L upper and lower extremities; DTRs 2+ intact and symmetric    LABS:                 CAPILLARY BLOOD GLUCOSE      POCT Blood Glucose.: 279 mg/dL (28 Mar 2018 22:05)  POCT Blood Glucose.: 205 mg/dL (28 Mar 2018 17:18)  POCT Blood Glucose.: 186 mg/dL (28 Mar 2018 10:59)  POCT Blood Glucose.: 189 mg/dL (28 Mar 2018 07:58)          REST OF [facility] LABS PENDING.    YESTERDAY labs:               8.9    6.8   )-----------( 402      ( 28 Mar 2018 10:06 )             28.0         03-28    131<L>  |  92<L>  |  18.0  ----------------------------<  192<H>  4.3   |  25.0  |  2.88<H>    Ca    8.4<L>      28 Mar 2018 10:06  Phos  1.8     03-28  Mg     1.7     03-28          Hemoglobin A1C, Whole Blood: 7.5 % (03-14 @ 11:12)    Culture Results:   No growth at 2 days.  Culture in progress (03-21 @ 11:30)  Culture Results:   No growth at 3 days.  Culture in progress (03-20 @ 12:48)  Culture Results:   <10,000 CFU/ml Gram Positive Cocci in Clusters  <10,000 CFU/ml Gram Positive Rods (03-17 @ 21:37)  Culture Results:   No growth at 5 days. (03-13 @ 22:13)  Culture Results:   No growth at 5 days. (03-13 @ 22:12)    RADIOLOGY & ADDITIONAL TESTS:       EXAM:  US DPLX [employer] RT                        PROCEDURE DATE:  03/25/2018    INTERPRETATION:  HISTORY: Right radial artery catheterization. Pain.   Pseudoaneurysm.  TECHNIQUE: Grey-scale and color-flow Doppler imaging   FINDINGS: No evidence of hematoma or pseudoaneurysm seen. Right radial   and ulnar arteries are patent.  IMPRESSION: No evidence of pseudoaneurysm  NISHA ZAVALA M.D., ATTENDING RADIOLOGIST  This document has been electronically signed. Mar 25 2018  4:24PM      EXAM:  US JOINT NONVASC EXT LTD RT                        PROCEDURE DATE:  03/25/2018    INTERPRETATION:  Clinical information: Worsening pain at site of IV   infiltration in medial aspect of right arm  Sonography of the right upper extremity was performed at the area of   clinical concern.  COMPARISON: None  FINDINGS/  IMPRESSION: No mass, hematoma or fluid collection is identified.  SYLVIE DAILEY M.D., ATTENDING RADIOLOGIST  This document has been electronically signed. Mar 25 2018  4:22PM           EXAM:  US DPLX UPR EXT VEINS LTD RT                        PROCEDURE DATE:  03/25/2018    INTERPRETATION:  Right upper extremity venous Doppler ultrasound   COMPARISON: None.  CLINICAL INFORMATION: Swelling, pain.  FINDINGS:  Right subclavian vein has limited visualization due to catheter placement  The right internal jugular, axillary, brachial, radial and ulnar veins show normal flow and are compressible where feasible, without evidence of DVT.  The cephalic vein is patent and compressible.  The basilic vein contains a focal thrombus within the mid forearm distally .  IMPRESSION:  Superficial basilic vein distal thrombus as described.  No evidence of upper extremity DVT. Incomplete evaluation of subclavian vein due to indwelling catheter.  NISHA ZAVALA M.D., ATTENDING RADIOLOGIST  This document has been electronically signed. Mar 25 2018  4:21PM      Referring Physician:  INDICATIONS: Acute diastolic congestive heart failure.  HISTORY: 58 yo woman with ESKD now on dialysis, admitted with worsening  SOB, orthopnea and anemia. She had bilateral pleural effusion and moderate  pericardial effusion. There was no prior cardiac history. The patient has  hypertension and dialysis-treated renal failure.  PROCEDURE:  --  Left heart catheterization with ventriculography.  --  Left coronary angiography.  --  Right coronary angiography.  TECHNIQUE: The risks and alternatives of the procedures and conscious  sedation were explained to the patient and informed consent was obtained.  Cardiac catheterization performed electively.  Local anesthetic given. Right radial artery access. Left heart  catheterization. Ventriculography was performed. Left coronary artery  angiography. The vessel was injected utilizing a catheter. Right coronary  artery angiography. The vessel was injected utilizing a catheter.  RADIATION EXPOSURE: 3 min.  CONTRAST GIVEN: Omnipaque 60 ml.  MEDICATIONS GIVEN: Verapamil (Isoptin, Calan, Covera), 5 mg, IA. Heparin,  3000 units, IA. 1% Lidocaine, 10 ml, subcutaneously.  VENTRICLES: There were no left ventricular global or regional wall motion  abnormalities. Global left ventricular function was normal. EF calculated  by contrast ventriculography was 55 %.  VALVES: AORTIC VALVE: No significant aortic valve gradient. MITRAL VALVE:  The mitral valve exhibited trivial regurgitation (less than 1+).  CORONARY VESSELS: The coronary circulation is right dominant.  LM:   --  LM: Normal. The vessel was normal sized, not calcified, and not  tortuous. Angiography showed no evidence of disease.  LAD:   --  LAD: Normal. The vessel was normal sized, not calcified, and not  tortuous. Angiography showed minor luminal irregularities with no flow  limiting lesions.  CX:   --  Circumflex: Normal. The vessel was normal sized, not calcified,  and excessively tortuous.Angiography showed no evidence of disease.  RCA:   --  RCA: Normal. The vessel was normal sized, not calcified, and  moderately tortuous. Angiography showed no evidence of disease.  COMPLICATIONS: There were no complications. No complications occurred  during the cath lab visit.  DIAGNOSTIC IMPRESSIONS: There is mild irregularity of the coronary anatomy.  Left ventricular function is normal (LVEF=55%).  DIAGNOSTIC RECOMMENDATIONS: The patient should continue with the present  medications. Patientmanagement should include aggressive medical therapy  and resumption of all previous activities in 2 days.        MEDICATIONS  (STANDING):  artificial  tears Solution 1 Drop(s) Both EYES every 12 hours  aspirin enteric coated 81 milliGRAM(s) Oral daily  carvedilol 12.5 milliGRAM(s) Oral every 12 hours  cloNIDine 0.2 milliGRAM(s) Oral two times a day  colchicine 0.6 milliGRAM(s) Oral two times a day  dextrose 50% Injectable 12.5 Gram(s) IV Push once  dextrose 50% Injectable 25 Gram(s) IV Push once  dextrose 50% Injectable 25 Gram(s) IV Push once  docusate sodium 100 milliGRAM(s) Oral daily  epoetin gian Injectable 55568 Unit(s) IV Push <User Schedule>  ergocalciferol 45798 Unit(s) Oral every week  heparin  Injectable 5000 Unit(s) SubCutaneous every 12 hours  hydrALAZINE 50 milliGRAM(s) Oral every 8 hours  insulin lispro (HumaLOG) corrective regimen sliding scale   SubCutaneous three times a day before meals  metoclopramide Injectable 10 milliGRAM(s) IV Push every 12 hours  senna 2 Tablet(s) Oral at bedtime  simvastatin 40 milliGRAM(s) Oral at bedtime    MEDICATIONS  (PRN):  acetaminophen   Tablet 650 milliGRAM(s) Oral every 6 hours PRN For Temp greater than 38 C (100.4 F)  acetaminophen   Tablet. 650 milliGRAM(s) Oral every 6 hours PRN Moderate Pain (4 - 6)  dextrose Gel 1 Dose(s) Oral once PRN Blood Glucose LESS THAN 70 milliGRAM(s)/deciliter  glucagon  Injectable 1 milliGRAM(s) IntraMuscular once PRN Glucose LESS THAN 70 milligrams/deciliter  ondansetron Injectable 4 milliGRAM(s) IV Push every 6 hours PRN Nausea Patient is a 57y old  Female who presents with a chief complaint of Nausea/vomiting and Chest pain (16 Mar 2018 17:20)    INTERVAL HPI/OVERNIGHT EVENTS:  Patient is a 56 y/o Female seen and examined at bedside. Patient underwent V/V fistula: Brachial artery to Axilary vein on 03/28/2018 on left arm.  She has right arm painful bruising since 4 days ago now markedly improved ipsilateral to catheterization from Friday.  LBM: 03/28/2018. HD schedule as per renal, today. and for EGD tomorrow.     ROS:  Patient denying CP, SOB, palpitations, leg swelling, lightheadedness/dizziness, paresthesias. All other ROS negative     Vital Signs Last 24 Hrs  T(C): 36.6 (29 Mar 2018 07:31), Max: 36.7 (28 Mar 2018 11:18)  T(F): 97.8 (29 Mar 2018 07:31), Max: 98.1 (28 Mar 2018 11:18)  HR: 69 (29 Mar 2018 07:31) (62 - 79)  BP: 139/79 (29 Mar 2018 07:31) (110/68 - 168/86)  BP(mean): 87 (28 Mar 2018 17:00) (87 - 92)  RR: 18 (29 Mar 2018 07:31) (13 - 23)  SpO2: 96% (29 Mar 2018 07:31) (93% - 96%)  c/s 190-192    PHYSICAL EXAM:    GENERAL: NAD, well-groomed, well-developed.  HEENT: eomi, perrla, MMM  CHEST/LUNG: fair b/l ae. CTA BL no wheezing, no crackles noted  HEART: S1S2nl, no r/g noted now. + 3/6 SM. No pericardial rub noted   ABDOMEN: Soft, Nontender, Nondistended; Bowel sounds present  EXTREMITIES:  2+ Peripheral Pulses, No clubbing, cyanosis, or edema in b/l LE. RUE with swelling with medial upper arm ecchymosis, decreased in size from yesterday and fading. Pulses in right arm palpable with normal temp  Skin: right sided PC in place (neck). Left arm with surgical wound covered, no active bleeding or discharge noted, no tender to touch.  NERVOUS SYSTEM:  Alert & Oriented X3, Good concentration; Motor Strength 5/5 B/L upper and lower extremities; DTRs 2+ intact and symmetric    CAPILLARY BLOOD GLUCOSE  POCT Blood Glucose.: 279 mg/dL (28 Mar 2018 22:05)  POCT Blood Glucose.: 205 mg/dL (28 Mar 2018 17:18)  POCT Blood Glucose.: 186 mg/dL (28 Mar 2018 10:59)  POCT Blood Glucose.: 189 mg/dL (28 Mar 2018 07:58)    Labs:                         9.3    9.3   )-----------( 475      ( 29 Mar 2018 08:34 )             29.2   03-29    127<L>  |  89<L>  |  31.0<H>  ----------------------------<  242<H>  4.9   |  25.0  |  4.15<H>    Ca    8.2<L>      29 Mar 2018 08:34  Phos  1.8     03-28  Mg     1.7     03-28          Hemoglobin A1C, Whole Blood: 7.5 % (03-14 @ 11:12)    Culture Results:   No growth at 2 days.  Culture in progress (03-21 @ 11:30)  Culture Results:   No growth at 3 days.  Culture in progress (03-20 @ 12:48)  Culture Results:   <10,000 CFU/ml Gram Positive Cocci in Clusters  <10,000 CFU/ml Gram Positive Rods (03-17 @ 21:37)  Culture Results:   No growth at 5 days. (03-13 @ 22:13)  Culture Results:   No growth at 5 days. (03-13 @ 22:12)    RADIOLOGY & ADDITIONAL TESTS:       EXAM:  US DPLX [employer] RT                        PROCEDURE DATE:  03/25/2018    INTERPRETATION:  HISTORY: Right radial artery catheterization. Pain.   Pseudoaneurysm.  TECHNIQUE: Grey-scale and color-flow Doppler imaging   FINDINGS: No evidence of hematoma or pseudoaneurysm seen. Right radial   and ulnar arteries are patent.  IMPRESSION: No evidence of pseudoaneurysm  NISHA ZAVALA M.D., ATTENDING RADIOLOGIST  This document has been electronically signed. Mar 25 2018  4:24PM      EXAM:  US JOINT NONVASC EXT LTD RT                        PROCEDURE DATE:  03/25/2018    INTERPRETATION:  Clinical information: Worsening pain at site of IV   infiltration in medial aspect of right arm  Sonography of the right upper extremity was performed at the area of   clinical concern.  COMPARISON: None  FINDINGS/  IMPRESSION: No mass, hematoma or fluid collection is identified.  SYLVIE DAILEY M.D., ATTENDING RADIOLOGIST  This document has been electronically signed. Mar 25 2018  4:22PM           EXAM:  US DPLX UPR EXT VEINS LTD RT                        PROCEDURE DATE:  03/25/2018    INTERPRETATION:  Right upper extremity venous Doppler ultrasound   COMPARISON: None.  CLINICAL INFORMATION: Swelling, pain.  FINDINGS:  Right subclavian vein has limited visualization due to catheter placement  The right internal jugular, axillary, brachial, radial and ulnar veins show normal flow and are compressible where feasible, without evidence of DVT.  The cephalic vein is patent and compressible.  The basilic vein contains a focal thrombus within the mid forearm distally .  IMPRESSION:  Superficial basilic vein distal thrombus as described.  No evidence of upper extremity DVT. Incomplete evaluation of subclavian vein due to indwelling catheter.  NISHA ZAVALA M.D., ATTENDING RADIOLOGIST  This document has been electronically signed. Mar 25 2018  4:21PM      Referring Physician:  INDICATIONS: Acute diastolic congestive heart failure.  HISTORY: 56 yo woman with ESKD now on dialysis, admitted with worsening  SOB, orthopnea and anemia. She had bilateral pleural effusion and moderate  pericardial effusion. There was no prior cardiac history. The patient has  hypertension and dialysis-treated renal failure.  PROCEDURE:  --  Left heart catheterization with ventriculography.  --  Left coronary angiography.  --  Right coronary angiography.  TECHNIQUE: The risks and alternatives of the procedures and conscious  sedation were explained to the patient and informed consent was obtained.  Cardiac catheterization performed electively.  Local anesthetic given. Right radial artery access. Left heart  catheterization. Ventriculography was performed. Left coronary artery  angiography. The vessel was injected utilizing a catheter. Right coronary  artery angiography. The vessel was injected utilizing a catheter.  RADIATION EXPOSURE: 3 min.  CONTRAST GIVEN: Omnipaque 60 ml.  MEDICATIONS GIVEN: Verapamil (Isoptin, Calan, Covera), 5 mg, IA. Heparin,  3000 units, IA. 1% Lidocaine, 10 ml, subcutaneously.  VENTRICLES: There were no left ventricular global or regional wall motion  abnormalities. Global left ventricular function was normal. EF calculated  by contrast ventriculography was 55 %.  VALVES: AORTIC VALVE: No significant aortic valve gradient. MITRAL VALVE:  The mitral valve exhibited trivial regurgitation (less than 1+).  CORONARY VESSELS: The coronary circulation is right dominant.  LM:   --  LM: Normal. The vessel was normal sized, not calcified, and not  tortuous. Angiography showed no evidence of disease.  LAD:   --  LAD: Normal. The vessel was normal sized, not calcified, and not  tortuous. Angiography showed minor luminal irregularities with no flow  limiting lesions.  CX:   --  Circumflex: Normal. The vessel was normal sized, not calcified,  and excessively tortuous.Angiography showed no evidence of disease.  RCA:   --  RCA: Normal. The vessel was normal sized, not calcified, and  moderately tortuous. Angiography showed no evidence of disease.  COMPLICATIONS: There were no complications. No complications occurred  during the cath lab visit.  DIAGNOSTIC IMPRESSIONS: There is mild irregularity of the coronary anatomy.  Left ventricular function is normal (LVEF=55%).  DIAGNOSTIC RECOMMENDATIONS: The patient should continue with the present  medications. Patientmanagement should include aggressive medical therapy  and resumption of all previous activities in 2 days.        MEDICATIONS  (STANDING):  artificial  tears Solution 1 Drop(s) Both EYES every 12 hours  aspirin enteric coated 81 milliGRAM(s) Oral daily  carvedilol 12.5 milliGRAM(s) Oral every 12 hours  cloNIDine 0.2 milliGRAM(s) Oral two times a day  colchicine 0.6 milliGRAM(s) Oral two times a day  dextrose 50% Injectable 12.5 Gram(s) IV Push once  dextrose 50% Injectable 25 Gram(s) IV Push once  dextrose 50% Injectable 25 Gram(s) IV Push once  docusate sodium 100 milliGRAM(s) Oral daily  epoetin gian Injectable 96141 Unit(s) IV Push <User Schedule>  ergocalciferol 43212 Unit(s) Oral every week  heparin  Injectable 5000 Unit(s) SubCutaneous every 12 hours  hydrALAZINE 50 milliGRAM(s) Oral every 8 hours  insulin lispro (HumaLOG) corrective regimen sliding scale   SubCutaneous three times a day before meals  metoclopramide Injectable 10 milliGRAM(s) IV Push every 12 hours  senna 2 Tablet(s) Oral at bedtime  simvastatin 40 milliGRAM(s) Oral at bedtime    MEDICATIONS  (PRN):  acetaminophen   Tablet 650 milliGRAM(s) Oral every 6 hours PRN For Temp greater than 38 C (100.4 F)  acetaminophen   Tablet. 650 milliGRAM(s) Oral every 6 hours PRN Moderate Pain (4 - 6)  dextrose Gel 1 Dose(s) Oral once PRN Blood Glucose LESS THAN 70 milliGRAM(s)/deciliter  glucagon  Injectable 1 milliGRAM(s) IntraMuscular once PRN Glucose LESS THAN 70 milligrams/deciliter  ondansetron Injectable 4 milliGRAM(s) IV Push every 6 hours PRN Nausea

## 2018-03-29 NOTE — PROGRESS NOTE ADULT - SUBJECTIVE AND OBJECTIVE BOX
NEPHROLOGY INTERVAL HPI/OVERNIGHT EVENTS:  pt comfortable when seen earlier  no acute distress    MEDICATIONS  (STANDING):  artificial  tears Solution 1 Drop(s) Both EYES every 12 hours  aspirin enteric coated 81 milliGRAM(s) Oral daily  carvedilol 12.5 milliGRAM(s) Oral every 12 hours  cloNIDine 0.2 milliGRAM(s) Oral two times a day  colchicine 0.6 milliGRAM(s) Oral two times a day  dextrose 50% Injectable 12.5 Gram(s) IV Push once  dextrose 50% Injectable 25 Gram(s) IV Push once  dextrose 50% Injectable 25 Gram(s) IV Push once  docusate sodium 100 milliGRAM(s) Oral daily  epoetin gian Injectable 88990 Unit(s) IV Push <User Schedule>  ergocalciferol 52978 Unit(s) Oral every week  heparin  Injectable 5000 Unit(s) SubCutaneous every 12 hours  hydrALAZINE 50 milliGRAM(s) Oral every 8 hours  insulin lispro (HumaLOG) corrective regimen sliding scale   SubCutaneous three times a day before meals  metoclopramide Injectable 10 milliGRAM(s) IV Push every 12 hours  senna 2 Tablet(s) Oral at bedtime  simvastatin 40 milliGRAM(s) Oral at bedtime    MEDICATIONS  (PRN):  acetaminophen   Tablet 650 milliGRAM(s) Oral every 6 hours PRN For Temp greater than 38 C (100.4 F)  acetaminophen   Tablet. 650 milliGRAM(s) Oral every 6 hours PRN Moderate Pain (4 - 6)  dextrose Gel 1 Dose(s) Oral once PRN Blood Glucose LESS THAN 70 milliGRAM(s)/deciliter  glucagon  Injectable 1 milliGRAM(s) IntraMuscular once PRN Glucose LESS THAN 70 milligrams/deciliter  ondansetron Injectable 4 milliGRAM(s) IV Push every 6 hours PRN Nausea      Allergies    No Known Allergies    Intolerances      Vital Signs Last 24 Hrs  T(C): 36.6 (29 Mar 2018 07:31), Max: 36.7 (28 Mar 2018 11:18)  T(F): 97.8 (29 Mar 2018 07:31), Max: 98.1 (28 Mar 2018 11:18)  HR: 69 (29 Mar 2018 07:31) (62 - 79)  BP: 139/79 (29 Mar 2018 07:31) (110/68 - 166/86)  BP(mean): 87 (28 Mar 2018 17:00) (87 - 92)  RR: 18 (29 Mar 2018 07:31) (13 - 23)  SpO2: 96% (29 Mar 2018 07:31) (93% - 96%)    PHYSICAL EXAM:  GENERAL: NAD  NECK: Supple, No JVD  NERVOUS SYSTEM:  Alert & Oriented X3,  intact and symmetric  CHEST/LUNG: Clear bilaterally   HEART: Regular rate and rhythm; No rub  ABDOMEN: Soft, +BS  EXTREMITIES:  2+ Peripheral Pulses, tr edema; L AVF +thrill +bruit  SKIN: No rashes or lesions    LABS:                        8.9    6.8   )-----------( 402      ( 28 Mar 2018 10:06 )             28.0     03-28    131<L>  |  92<L>  |  18.0  ----------------------------<  192<H>  4.3   |  25.0  |  2.88<H>    Ca    8.4<L>      28 Mar 2018 10:06  Phos  1.8     03-28  Mg     1.7     03-28      PT/INR - ( 28 Mar 2018 08:44 )   PT: 12.4 sec;   INR: 1.12 ratio             Magnesium, Serum: 1.7 mg/dL (03-28 @ 10:06)  Phosphorus Level, Serum: 1.8 mg/dL (03-28 @ 10:06)      RADIOLOGY & ADDITIONAL TESTS:

## 2018-03-29 NOTE — PROGRESS NOTE ADULT - ATTENDING COMMENTS
Note addended where needed. Plan discussed with patient at bedside. She is aware of planned egd and had no questions. Her PCP is Dr Lopez - will call him in am with update

## 2018-03-29 NOTE — PROGRESS NOTE ADULT - PROBLEM SELECTOR PLAN 1
likely Anemia of chronic disease due to worsening acute on chronic renal insufficiency in the setting of CKD,  and concomitant  iron deficiency   -s/p 4 pRBC total: 2 units pRBCon admission with improvement in h/h , another unit pRBC 3/23, another on 03/27/2018.  -occult stool neg  -c/w ppi po bid   Hgb >8 and the day after HD. Cleared by Cardio on 03/25/2018  continue EPOGEN with HD and ferrous sulfate  Keep Hgb over 8. 1 unit transfused yesterday likely Anemia of chronic disease due to worsening acute on chronic renal insufficiency in the setting of CKD,  and concomitant  iron deficiency   -s/p 4 pRBC total: 2 units pRBCon admission with improvement in h/h , another unit pRBC 3/23, another on 03/27/2018.  -occult stool neg  -c/w ppi po bid   Hgb >8 and the day after HD. Cleared by Cardio on 03/25/2018. Plan for EGD tomorrow. Pt will be NPO after midnight  continue EPOGEN with HD and ferrous sulfate  Keep Hgb over 8. 1 unit transfused 03/27/2018 likely Anemia of chronic disease due to worsening acute on chronic renal insufficiency in the setting of CKD,  and concomitant  iron deficiency   -s/p 4 pRBC total: with the last one being on 3/27 with a good Hgb response   -occult stool neg  -c/w ppi po bid   Hgb >8 and the day after HD. Cleared by Cardio on 03/25/2018. Plan for EGD tomorrow. Pt will be NPO after midnight  continue EPOGEN with HD and ferrous sulfate  to hold heparin sq for egd

## 2018-03-29 NOTE — PROGRESS NOTE ADULT - ASSESSMENT
57 y.o. F with hx of HTN, HLD, DMII, hyperkalemia, ckd 3 who presented with 3 weeks of postprandial vomiting and abd pain, noted to have ARF, fluid overload with bilateral pleural effusions and pericardial effusion as well as symptomatic anemia likely multifactorial secondary to acute on chronic renal failure and suspected upper GI Bleeding from vomiting/retching and NSAID use. Clinical concern that worsening acute on chronic renal failure possibly caused uremia related sx of N/V, with patients NSAID use and retching suspected GI bleed but occult blood returned negative, for dropping h/h pt received 2 u prbc on admission with appropriate response to therapy. 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 In the interim while being dialyzed the patient had an episode of PAF, s/p cardizem became rate controlled and remained in NSR. EP consulted. Unable to AC the patient due to initial concern for GI bleeding, for which GI consulted and tentatively GI intervention on 03/29/2018, cleared by cardio on 03/25/218. 2 days ago presented right arm ecchymosis on the cath side. Us of right arm reported superficial basilic vein distal thrombus but no didn't report DVT, pseudoaneurysm or hematoma    S/p R thoracocentesis on 3/20 with 1100 cc removed  S/P pleurocentesis done 3/21 on left sided with removal of 1 L. Fluid analysis consistent with transudate   s/p cath on 03/24/2018, NEG, EF 55%  tentatively GI intervention (EGD) on 03/29/2018  cleared by cardio on 03/25/218   and A/V fistula on 03/28/2018 as per vascular surgery 57 y.o. F with hx of HTN, HLD, DMII, hyperkalemia, ckd 3 who presented with 3 weeks of postprandial vomiting and abd pain, noted to have ARF, fluid overload with bilateral pleural effusions and pericardial effusion as well as symptomatic anemia likely multifactorial secondary to acute on chronic renal failure and suspected upper GI Bleeding from vomiting/retching and NSAID use. Clinical concern that worsening acute on chronic renal failure possibly caused uremia related sx of N/V, with patients NSAID use and retching suspected GI bleed but occult blood returned negative, for dropping h/h pt received 2 u prbc on admission with appropriate response to therapy. 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 In the interim while being dialyzed the patient had an episode of PAF, s/p cardizem became rate controlled and remained in NSR. EP consulted. Unable to AC the patient due to initial concern for GI bleeding, for which GI consulted and tentatively GI intervention on 03/29/2018, cleared by cardio on 03/25/218. 4 days ago presented right arm ecchymosis on the cath side. Us of right arm reported superficial basilic vein distal thrombus but no didn't report DVT, pseudoaneurysm or hematoma. Now markedly improved. She is s/p A/V fistula on left arm: Brachial artery to Axilary vein (03/28/2018), plan for HD today and EGD tomorrow. as per cardio plan for eventual GERALD.    S/p R thoracocentesis on 3/20 with 1100 cc removed  S/P pleurocentesis done 3/21 on left sided with removal of 1 L. Fluid analysis consistent with transudate   s/p cath on 03/24/2018, NEG, EF 55%  s/p A/V fistula on left arm (03/28/2018)  tentatively GI intervention (EGD) on 03/29/2018,  cleared by cardio on 03/25/218 57 y.o. F with hx of HTN, HLD, DMII, hyperkalemia, ckd 3 who presented with 3 weeks of postprandial vomiting and abd pain, noted to have ARF, fluid overload with bilateral pleural effusions and pericardial effusion as well as symptomatic anemia likely multifactorial secondary to acute on chronic renal failure and suspected upper GI Bleeding from vomiting/retching and NSAID use. Patient received 2 units of blood on admission with a good response. A trial of lasix was given and hospital course complicated by finding of uremic pericarditis which led to emergent HD and patient now being on permanent HD.  In the interim while being dialyzed the patient had an episode of PAF, s/p cardizem became rate controlled and remained in NSR. EP consulted. Unable to AC the patient due to initial concern for GI bleeding, for which GI consulted and tentatively GI intervention on 03/29/2018, cleared by cardio on 03/25/218. 4 days ago presented right arm ecchymosis on the cath side. Us of right arm reported superficial basilic vein distal thrombus but no DVT, pseudoaneurysm or hematoma. Now markedly improved. She is s/p A/V fistula on left arm: Brachial artery to Axillary vein (03/28/2018), plan for HD today and EGD tomorrow. as per cardio plan for eventual GERALD.    In hospital procedures:   S/p R thoracocentesis on 3/20 with 1100 cc removed  S/P pleurocentesis done 3/21 on left sided with removal of 1 L. Fluid analysis consistent with transudate   s/p cath on 03/24/2018, NEG, EF 55%  s/p A/V fistula on left arm (03/28/2018)  tentatively GI intervention (EGD) on 03/29/2018,  cleared by cardio on 03/25/218

## 2018-03-29 NOTE — PROGRESS NOTE ADULT - SUBJECTIVE AND OBJECTIVE BOX
Patient is a 57y old  Female who presents with a chief complaint of Nausea/vomiting and Chest pain (16 Mar 2018 17:20)  CKD requiring initiation of HD  S/P R IJ permcath placement  Pt is S/P  L Brach/axillary AVG          POD#  1  No c/o pain in L hand; mild incisional discomfort    Vital Signs Last 24 Hrs  T(C): 36.6 (29 Mar 2018 07:31), Max: 36.7 (28 Mar 2018 11:18)  T(F): 97.8 (29 Mar 2018 07:31), Max: 98.1 (28 Mar 2018 11:18)  HR: 69 (29 Mar 2018 07:31) (62 - 79)  BP: 139/79 (29 Mar 2018 07:31) (110/68 - 166/86)  BP(mean): 87 (28 Mar 2018 17:00) (87 - 92)  RR: 18 (29 Mar 2018 07:31) (13 - 23)  SpO2: 96% (29 Mar 2018 07:31) (93% - 96%)  I&O's Detail    MEDICATIONS  (STANDING):  artificial  tears Solution 1 Drop(s) Both EYES every 12 hours  aspirin enteric coated 81 milliGRAM(s) Oral daily  carvedilol 12.5 milliGRAM(s) Oral every 12 hours  cloNIDine 0.2 milliGRAM(s) Oral two times a day  colchicine 0.6 milliGRAM(s) Oral two times a day  dextrose 50% Injectable 12.5 Gram(s) IV Push once  dextrose 50% Injectable 25 Gram(s) IV Push once  dextrose 50% Injectable 25 Gram(s) IV Push once  docusate sodium 100 milliGRAM(s) Oral daily  epoetin gian Injectable 76834 Unit(s) IV Push <User Schedule>  ergocalciferol 28466 Unit(s) Oral every week  heparin  Injectable 5000 Unit(s) SubCutaneous every 12 hours  hydrALAZINE 50 milliGRAM(s) Oral every 8 hours  insulin lispro (HumaLOG) corrective regimen sliding scale   SubCutaneous three times a day before meals  metoclopramide Injectable 10 milliGRAM(s) IV Push every 12 hours  senna 2 Tablet(s) Oral at bedtime  simvastatin 40 milliGRAM(s) Oral at bedtime    MEDICATIONS  (PRN):  acetaminophen   Tablet 650 milliGRAM(s) Oral every 6 hours PRN For Temp greater than 38 C (100.4 F)  acetaminophen   Tablet. 650 milliGRAM(s) Oral every 6 hours PRN Moderate Pain (4 - 6)  dextrose Gel 1 Dose(s) Oral once PRN Blood Glucose LESS THAN 70 milliGRAM(s)/deciliter  glucagon  Injectable 1 milliGRAM(s) IntraMuscular once PRN Glucose LESS THAN 70 milligrams/deciliter  ondansetron Injectable 4 milliGRAM(s) IV Push every 6 hours PRN Nausea      PAST MEDICAL & SURGICAL HISTORY:  HLD (hyperlipidemia)  HTN (hypertension)  DM (diabetes mellitus)  No significant past surgical history    Physical Exam:  General: NAD, resting comfortably in bed  Extremities: LUE WWP; mildly edematous; L AVG + bruit, + thrill. Incisions with steris intact. Mild ecchymosis noted. +radial pulse, motor and sensation L hand intact      LABS:                        9.3    9.3   )-----------( 475      ( 29 Mar 2018 08:34 )             29.2     03-28    131<L>  |  92<L>  |  18.0  ----------------------------<  192<H>  4.3   |  25.0  |  2.88<H>    Ca    8.4<L>      28 Mar 2018 10:06  Phos  1.8     03-28  Mg     1.7     03-28      PT/INR - ( 28 Mar 2018 08:44 )   PT: 12.4 sec;   INR: 1.12 ratio      CAPILLARY BLOOD GLUCOSE  POCT Blood Glucose.: 237 mg/dL (29 Mar 2018 07:45)  POCT Blood Glucose.: 279 mg/dL (28 Mar 2018 22:05)  POCT Blood Glucose.: 205 mg/dL (28 Mar 2018 17:18)  POCT Blood Glucose.: 186 mg/dL (28 Mar 2018 10:59)      Radiology and Additional Studies:    Assessment:57yFemaleHPI:  Ms. Elizabeth is a 58 yo F with PMH s/f HTN/HLD, DMII, hyperkalemia who presents with 3 weeks of postprandial vomiting and stomach pain. Patient states that for the past three weeks, she has had nausea with postprandial emesis of food contents.  Today she vomited twice, one time which had an isolated episode of scant bright red blood in the vomitus. After vomiting she feels lightheadedness/weakness without vertigo. Associated with the nausea she has had a pleuritic chest pain, 10/10 in severity, intermittent, which arises after she vomits, worsens with leaning forward, alleviated with supine position. For the pain she has taken 600mg motrin once a day, but due to stomach upset from the Motrin also took Tylenol for pain.     Patient states bowel movements have been dark brown.   Patient also has had a tactile fever with chills, productive cough with white sputum. Denies sick contacts/recent travel. No dysuria, hematuria, hematochezia, melena.   Of note, patient had recent negative stress test in January . (13 Mar 2018 22:20)  CKD requiring initiation of HD  S/P R IJ permcath placement  Pt is S/P  L Brach/axillary AVG          POD#  1    Plan:  Cont HD via R IJ permcath  Follow up with Dr Marsh as outpt in 2 weeks  Recommendations for dc:  No heavy lifting <10lbs.   May wash incision with soap and water and pat dry. Leave open to air. No ointments, powders or creams to incision. Do not shave over incision x 3 weeks or until healed. Monitor incision for any redness, swelling or drainage and call office immediately with any concerns. Office # [phone]

## 2018-03-29 NOTE — PROGRESS NOTE ADULT - PROBLEM SELECTOR PLAN 7
Resolved, now mildly constipated and improving. Will adjust stool softeners No chemical ac given severe anemia   SCDs while in bed  Ambulate with assistance

## 2018-03-29 NOTE — PROGRESS NOTE ADULT - SUBJECTIVE AND OBJECTIVE BOX
Patient is a 57y old  Female who presents with a chief complaint of Nausea/vomiting and Chest pain (16 Mar 2018 17:20)      HPI:  Ms. Elizabeth is a 58 yo F with PMH - anemia. Most likely due to ARF. Patient  was having severe nausea and vomiting. Now vomiting resolved.  had AV fistula yesterday. Dialysis will be today. Will plan for EGD in am    REVIEW OF SYSTEMS:  Constitutional: No fever, weight loss or fatigue  ENMT:  No difficulty hearing, tinnitus, vertigo; No sinus or throat pain  Respiratory: No cough, wheezing, chills or hemoptysis  Cardiovascular: No chest pain, palpitations, dizziness or leg swelling  Gastrointestinal: No abdominal or epigastric pain. No nausea, vomiting or hematemesis; No diarrhea or constipation. No melena or hematochezia.  Skin: No itching, burning, rashes or lesions   Musculoskeletal: No joint pain or swelling; No muscle, back or extremity pain    PAST MEDICAL & SURGICAL HISTORY:  HLD (hyperlipidemia)  HTN (hypertension)  DM (diabetes mellitus)  No significant past surgical history      FAMILY HISTORY:  No pertinent family history in first degree relatives      SOCIAL HISTORY:  Smoking Status: [ ] Current, [ ] Former, [ ] Never  Pack Years:  [  ] EtOH-no  [  ] IVDA-no    MEDICATIONS:  MEDICATIONS  (STANDING):  artificial  tears Solution 1 Drop(s) Both EYES every 12 hours  aspirin enteric coated 81 milliGRAM(s) Oral daily  carvedilol 12.5 milliGRAM(s) Oral every 12 hours  cloNIDine 0.2 milliGRAM(s) Oral two times a day  colchicine 0.6 milliGRAM(s) Oral two times a day  dextrose 50% Injectable 12.5 Gram(s) IV Push once  dextrose 50% Injectable 25 Gram(s) IV Push once  dextrose 50% Injectable 25 Gram(s) IV Push once  docusate sodium 100 milliGRAM(s) Oral daily  epoetin gian Injectable 92872 Unit(s) IV Push <User Schedule>  ergocalciferol 08435 Unit(s) Oral every week  heparin  Injectable 5000 Unit(s) SubCutaneous every 12 hours  hydrALAZINE 50 milliGRAM(s) Oral every 8 hours  insulin lispro (HumaLOG) corrective regimen sliding scale   SubCutaneous three times a day before meals  metoclopramide Injectable 10 milliGRAM(s) IV Push every 12 hours  senna 2 Tablet(s) Oral at bedtime  simvastatin 40 milliGRAM(s) Oral at bedtime    MEDICATIONS  (PRN):  acetaminophen   Tablet 650 milliGRAM(s) Oral every 6 hours PRN For Temp greater than 38 C (100.4 F)  acetaminophen   Tablet. 650 milliGRAM(s) Oral every 6 hours PRN Moderate Pain (4 - 6)  dextrose Gel 1 Dose(s) Oral once PRN Blood Glucose LESS THAN 70 milliGRAM(s)/deciliter  glucagon  Injectable 1 milliGRAM(s) IntraMuscular once PRN Glucose LESS THAN 70 milligrams/deciliter  ondansetron Injectable 4 milliGRAM(s) IV Push every 6 hours PRN Nausea      Allergies    No Known Allergies    Intolerances        Vital Signs Last 24 Hrs  T(C): 36.6 (29 Mar 2018 07:31), Max: 36.7 (28 Mar 2018 11:18)  T(F): 97.8 (29 Mar 2018 07:31), Max: 98.1 (28 Mar 2018 11:18)  HR: 69 (29 Mar 2018 07:31) (62 - 79)  BP: 139/79 (29 Mar 2018 07:31) (110/68 - 166/86)  BP(mean): 87 (28 Mar 2018 17:00) (87 - 92)  RR: 18 (29 Mar 2018 07:31) (13 - 23)  SpO2: 96% (29 Mar 2018 07:31) (93% - 96%)        PHYSICAL EXAM:    General: Well developed; well nourished; in no acute distress  HEENT: MMM, conjunctiva and sclera clear  Gastrointestinal: Soft, non-tender non-distended; Normal bowel sounds; No rebound or guarding  Extremities: Normal range of motion, No clubbing, cyanosis or edema  Neurological: Alert and oriented x3  Skin: Warm and dry. No obvious rash      LABS:                        9.3    9.3   )-----------( 475      ( 29 Mar 2018 08:34 )             29.2     28 Mar 2018 10:06    131    |  92     |  18.0   ----------------------------<  192    4.3     |  25.0   |  2.88     Ca    8.4        28 Mar 2018 10:06  Phos  1.8       28 Mar 2018 10:06  Mg     1.7       28 Mar 2018 10:06                RADIOLOGY & ADDITIONAL STUDIES:     < from: Xray Chest 1 View AP/PA. (03.21.18 @ 11:51) >  MPRESSION:    Support Devices: None  Heart: Cardiomegaly  Mediastinum:  Unremarkable  Lungs/Airways: Decreased left pleural effusion. No significant   pneumothorax. Multiple moderate right pleural effusion.  Bones/Soft tissues: Unremarkable      < end of copied text >

## 2018-03-29 NOTE — PROGRESS NOTE ADULT - PROBLEM SELECTOR PLAN 2
baseline CKD 3 now progressed to ESRD requiring HD: with uremic pericarditis requiring dialysis as mentioned above (c/w tx with colchicine bid for a total of 4 weeks which will end on 4/5)  -c/w standard HD sessions   -Acute hepatitis panel negative; ppd at 72 hours 0mm negative  -Permacath on right side, neck. Placed  03/22/2018  -planning on AVF/AVG, tentative on 03/28/2018 by vascular  -EPO weekly added on 03/22/18 per nephro baseline CKD 3 now progressed to ESRD requiring HD: with uremic pericarditis requiring dialysis as mentioned above (c/w tx with colchicine bid for a total of 4 weeks which will end on 4/5)  -c/w standard HD sessions   -Acute hepatitis panel negative; ppd at 72 hours 0mm negative  -Permacath on right side, neck. Placed  03/22/2018  -s/p AVF/AVG on left arm: Brachial artery to Axillary vein on 03/28/2018 by vascular  -EPO weekly added on 03/22/18 per nephro now progressed to ESRD requiring HD: with uremic pericarditis requiring dialysis as mentioned above (c/w tx with colchicine bid for a total of 4 weeks which will end on 4/5)  -c/w standard HD sessions   -Acute hepatitis panel negative; ppd at 72 hours 0mm negative  -Permacath on right side, neck. Placed  03/22/2018  -s/p AVF/AVG on left arm: Brachial artery to Axillary vein on 03/28/2018 by vascular  -repeat hep panel ordered for HD placement

## 2018-03-29 NOTE — PROGRESS NOTE ADULT - PROBLEM SELECTOR PLAN 4
-No further episodes of AFib  -no AC at this time until GI work up completed  [facility] following and cleared for endoscopy on 03/25/2018  Rates well controlled on b blocker  s/p negative cath; plan for GERALD prior to d/c

## 2018-03-29 NOTE — PROGRESS NOTE ADULT - PROBLEM SELECTOR PLAN 3
likely 2/2 superficial thrombus  Us of right arm reported superficial basilic vein distal thrombus   elevation and compresses likely 2/2 superficial thrombus. Now markedly improved  Us of right arm reported superficial basilic vein distal thrombus   elevation and compresses  left arm: s/p A/V fistula. No active bleeding or discharge noted, no tender likely 2/2 superficial thrombus. Now markedly improved  elevation and compresses  left arm: s/p A/V fistula. No active bleeding or discharge noted, no tender

## 2018-03-29 NOTE — PROGRESS NOTE ADULT - PROBLEM SELECTOR PLAN 5
-not well controlled , SBp around 150's  -c/w clonidine, increased today per renal on 03/27/2018 to 0.2mg BID  -increased hydralazine to 50mg tid and monitor   -continue coreg -better controlled   -c/w clonidine, increased today per renal on 03/27/2018 to 0.2mg BID  -increased hydralazine to 50mg tid and monitor   -continue coreg

## 2018-03-30 ENCOUNTER — RESULT REVIEW (OUTPATIENT)
Age: 58
End: 2018-03-30

## 2018-03-30 LAB
GLUCOSE BLDC GLUCOMTR-MCNC: 207 MG/DL — HIGH (ref 70–99)
GLUCOSE BLDC GLUCOMTR-MCNC: 212 MG/DL — HIGH (ref 70–99)
GLUCOSE BLDC GLUCOMTR-MCNC: 257 MG/DL — HIGH (ref 70–99)
HAV IGM SER-ACNC: SIGNIFICANT CHANGE UP
HBV CORE IGM SER-ACNC: SIGNIFICANT CHANGE UP
HBV SURFACE AG SER-ACNC: SIGNIFICANT CHANGE UP
HCV AB S/CO SERPL IA: 0.14 S/CO — SIGNIFICANT CHANGE UP
HCV AB SERPL-IMP: SIGNIFICANT CHANGE UP

## 2018-03-30 PROCEDURE — 88305 TISSUE EXAM BY PATHOLOGIST: CPT | Mod: 26

## 2018-03-30 PROCEDURE — 99233 SBSQ HOSP IP/OBS HIGH 50: CPT | Mod: GC

## 2018-03-30 PROCEDURE — 99233 SBSQ HOSP IP/OBS HIGH 50: CPT

## 2018-03-30 PROCEDURE — 43239 EGD BIOPSY SINGLE/MULTIPLE: CPT

## 2018-03-30 PROCEDURE — 88342 IMHCHEM/IMCYTCHM 1ST ANTB: CPT | Mod: 26

## 2018-03-30 RX ORDER — PANTOPRAZOLE SODIUM 20 MG/1
40 TABLET, DELAYED RELEASE ORAL
Qty: 0 | Refills: 0 | Status: DISCONTINUED | OUTPATIENT
Start: 2018-03-30 | End: 2018-04-03

## 2018-03-30 RX ORDER — WARFARIN SODIUM 2.5 MG/1
5 TABLET ORAL ONCE
Qty: 0 | Refills: 0 | Status: COMPLETED | OUTPATIENT
Start: 2018-03-30 | End: 2018-03-30

## 2018-03-30 RX ORDER — ENOXAPARIN SODIUM 100 MG/ML
60 INJECTION SUBCUTANEOUS EVERY 24 HOURS
Qty: 0 | Refills: 0 | Status: DISCONTINUED | OUTPATIENT
Start: 2018-03-30 | End: 2018-04-01

## 2018-03-30 RX ADMIN — Medication 81 MILLIGRAM(S): at 13:17

## 2018-03-30 RX ADMIN — Medication 0.2 MILLIGRAM(S): at 05:31

## 2018-03-30 RX ADMIN — Medication 50 MILLIGRAM(S): at 05:31

## 2018-03-30 RX ADMIN — Medication 0.2 MILLIGRAM(S): at 18:53

## 2018-03-30 RX ADMIN — Medication 0.6 MILLIGRAM(S): at 18:53

## 2018-03-30 RX ADMIN — Medication 2: at 13:18

## 2018-03-30 RX ADMIN — SENNA PLUS 2 TABLET(S): 8.6 TABLET ORAL at 23:27

## 2018-03-30 RX ADMIN — CARVEDILOL PHOSPHATE 12.5 MILLIGRAM(S): 80 CAPSULE, EXTENDED RELEASE ORAL at 18:53

## 2018-03-30 RX ADMIN — SIMVASTATIN 40 MILLIGRAM(S): 20 TABLET, FILM COATED ORAL at 23:28

## 2018-03-30 RX ADMIN — Medication 1 DROP(S): at 05:31

## 2018-03-30 RX ADMIN — Medication 50 MILLIGRAM(S): at 13:18

## 2018-03-30 RX ADMIN — Medication 1 DROP(S): at 18:55

## 2018-03-30 RX ADMIN — CARVEDILOL PHOSPHATE 12.5 MILLIGRAM(S): 80 CAPSULE, EXTENDED RELEASE ORAL at 05:31

## 2018-03-30 RX ADMIN — Medication 50 MILLIGRAM(S): at 23:27

## 2018-03-30 RX ADMIN — Medication 100 MILLIGRAM(S): at 13:18

## 2018-03-30 NOTE — PROGRESS NOTE ADULT - ASSESSMENT
CKD V: h/o DM  Fluid overload (pleural and pericardial effusions)==> resolved  - HD tomorrow on a TTS schedule  - await maturation of AVF    Anemia: r/o GI blood loss  - trend h/h  - MARGI w HD  - GI -EGD today    RO: hypophosphatemia  -  binders on hold  - cont Rocaltrol  - trend phosphorous

## 2018-03-30 NOTE — PROGRESS NOTE ADULT - PROBLEM SELECTOR PLAN 5
-better controlled   -c/w clonidine, increased  per renal on 03/27/2018 to 0.2mg BID  -increased hydralazine to 50mg tid and monitor   -continue coreg -better controlled   -c/w clonidine, increased  per renal on 03/27/2018 to 0.2mg BID  -increased hydralazine to 50mg tid doint well, BP controlled    -continue coreg

## 2018-03-30 NOTE — PROGRESS NOTE ADULT - PROBLEM SELECTOR PLAN 3
likely 2/2 superficial thrombus. Now markedly improved  elevation and compresses  left arm: s/p A/V fistula. No active bleeding or discharge noted, no tender

## 2018-03-30 NOTE — PROGRESS NOTE ADULT - PROBLEM SELECTOR PLAN 1
likely Anemia of chronic disease due to worsening acute on chronic renal insufficiency in the setting of CKD with concomitant  iron deficiency   -s/p 4 pRBC total: with the last one being on 3/27 with a good Hgb response   -occult stool neg  -c/w PPI po bid   Hgb >8 and the day after HD. Cleared by Cardio on 03/25/2018. Plan for EGD today  continue EPOGEN with HD and ferrous sulfate  held am heparin sq for egd

## 2018-03-30 NOTE — BRIEF OPERATIVE NOTE - OPERATION/FINDINGS
altered vascular pattern in gastric fundus, body and antrum with scattered subepitheleal petechiae and ecchymosis, otherwise normal exam
patent brachial artery and axilliary vein

## 2018-03-30 NOTE — BRIEF OPERATIVE NOTE - POST-OP DX
ESRD (end stage renal disease) on dialysis  03/28/2018    Active  Daljit Yadav
ESRD (end stage renal disease) on dialysis  03/28/2018    Active  Daljit Yadav  Other gastritis with bleeding, unspecified chronicity  03/30/2018    Active  Adrián Lauren

## 2018-03-30 NOTE — BRIEF OPERATIVE NOTE - PRE-OP DX
ESRD (end stage renal disease) on dialysis  03/28/2018    Active  Daljit Yadav
ESRD (end stage renal disease) on dialysis  03/28/2018    Active  Daljit Yadav  Hematemesis with nausea  03/30/2018    Active  Adrián Lauren

## 2018-03-30 NOTE — PROGRESS NOTE ADULT - ASSESSMENT
57 y.o. F with hx of HTN, HLD, DMII, hyperkalemia, ckd 3 who presented with 3 weeks of postprandial vomiting and abd pain, noted to have ARF, fluid overload with bilateral pleural effusions and pericardial effusion as well as symptomatic anemia likely multifactorial secondary to acute on chronic renal failure and suspected upper GI Bleeding from vomiting/retching and NSAID use. Patient received 2 units of blood on admission with a good response. A trial of lasix was given and hospital course complicated by finding of uremic pericarditis which led to emergent HD and patient now being on permanent HD.  In the interim while being dialyzed the patient had an episode of PAF, s/p cardizem became rate controlled and remained in NSR. EP consulted. Unable to AC the patient due to initial concern for GI bleeding, for which GI consulted and tentatively GI intervention on 03/29/2018, cleared by cardio on 03/25/218. 4 days ago presented right arm ecchymosis on the cath side. Us of right arm reported superficial basilic vein distal thrombus but no DVT, pseudoaneurysm or hematoma. Now markedly improved. She is s/p A/V fistula on left arm: Brachial artery to Axillary vein (03/28/2018), plan for HD today and EGD tomorrow. as per cardio plan for eventual GERALD.    In hospital procedures:   S/p R thoracocentesis on 3/20 with 1100 cc removed  S/P pleurocentesis done 3/21 on left sided with removal of 1 L. Fluid analysis consistent with transudate   s/p cath on 03/24/2018, NEG, EF 55%  s/p A/V fistula on left arm (03/28/2018)  tentatively GI intervention (EGD) on 03/29/2018,  cleared by cardio on 03/25/218 57 y.o. F with hx of HTN, HLD, DMII, hyperkalemia, ckd 3 who presented with 3 weeks of postprandial vomiting and abd pain, noted to have ARF, fluid overload with bilateral pleural effusions and pericardial effusion as well as symptomatic anemia likely multifactorial secondary to acute on chronic renal failure and suspected upper GI Bleeding from vomiting/retching and NSAID use. Patient received 2 units of blood on admission with a good response. A trial of lasix was given and hospital course complicated by finding of uremic pericarditis which led to emergent HD and patient now being on permanent HD.  In the interim while being dialyzed the patient had an episode of PAF, s/p cardizem became rate controlled and remained in NSR. EP consulted. Unable to AC the patient due to initial concern for GI bleeding, for which GI consulted and tentatively GI intervention on 03/29/2018, cleared by cardio on 03/25/218. 4 days ago presented right arm ecchymosis on the cath site (cath was negative). Us of right arm reported superficial basilic vein distal thrombus but no DVT, pseudoaneurysm or hematoma. Now markedly improved. She is s/p A/V fistula on left arm: Brachial artery to Axillary vein (03/28/2018), plan for HD today and EGD tomorrow. as per cardio plan for eventual GERALD.    In hospital procedures:   S/p R thoracocentesis on 3/20 with 1100 cc removed  S/P pleurocentesis done 3/21 on left sided with removal of 1 L. Fluid analysis consistent with transudate   s/p cath on 03/24/2018, NEG, EF 55%  s/p A/V fistula on left arm (03/28/2018)  tentatively GI intervention (EGD) on 03/29/2018,  cleared by cardio on 03/25/218

## 2018-03-30 NOTE — PROGRESS NOTE ADULT - ASSESSMENT
Ms. Elizabeth is a 56 yo Divehi speaking female with hx of hypertension, hyperlipidemia, type 2 diabetes, with 1 month hx of vomiting and abdominal pain, with NSAID use.  Developed pleuritic chest pain, worsened in supine position and with deep breaths - being treated for uremic pericarditis, on colchicine.  She had bilateral pleural effusions, underwent IR guided thoracentesis with resolution of shortness of breath.  She underwent cardiac catheterization which did not demonstrate obstructive CAD.  Biventricular systolic function is preserved.       1. pleural effusions - s/p bilateral thoracentesis (1L each side), breathing much improved.    2. abdominal pain/nausea/vomiting- no further symptoms, gastritis - discussed with Dr. Lauren, low risk or bleeding  3. uremic pericarditis- pain well controlled, continue colchicine for a total of 4 weeks.   4. anemia - may be due to chronic disease,  GI workup pending.  CKD  Keep Hb > 9.    5 hypertension- well controlled, continue carvedilol, hydralazine, clonidine, lasix.  Controlled. Continue current regimen  6. moderate pericardial effusion - hemodynamically stable, chronic. No further interventions warranted.    7. acute on chronic renal insufficiency -   HD per renal  8. paroxysmal atrial fibrillation - newly diagnosed; no evidence for active bleeding.  Would anticoagulate. LMWH bridge to coumadin.  If unable to tolerate AC, will plan for Watchman in the future.     Thank you for allowing me to participate in care of your patient.   Please call as needed.  D/W Dr. Hoffmann-Raul

## 2018-03-30 NOTE — PROGRESS NOTE ADULT - SUBJECTIVE AND OBJECTIVE BOX
NEPHROLOGY INTERVAL HPI/OVERNIGHT EVENTS:    Examined earlier  EGD today     MEDICATIONS  (STANDING):  artificial  tears Solution 1 Drop(s) Both EYES every 12 hours  aspirin enteric coated 81 milliGRAM(s) Oral daily  carvedilol 12.5 milliGRAM(s) Oral every 12 hours  cloNIDine 0.2 milliGRAM(s) Oral two times a day  colchicine 0.6 milliGRAM(s) Oral two times a day  dextrose 50% Injectable 12.5 Gram(s) IV Push once  dextrose 50% Injectable 25 Gram(s) IV Push once  dextrose 50% Injectable 25 Gram(s) IV Push once  docusate sodium 100 milliGRAM(s) Oral daily  epoetin gian Injectable 35891 Unit(s) IV Push <User Schedule>  ergocalciferol 37155 Unit(s) Oral every week  hydrALAZINE 50 milliGRAM(s) Oral every 8 hours  insulin lispro (HumaLOG) corrective regimen sliding scale   SubCutaneous three times a day before meals  pantoprazole    Tablet 40 milliGRAM(s) Oral before breakfast  senna 2 Tablet(s) Oral at bedtime  simvastatin 40 milliGRAM(s) Oral at bedtime    MEDICATIONS  (PRN):  acetaminophen   Tablet 650 milliGRAM(s) Oral every 6 hours PRN For Temp greater than 38 C (100.4 F)  acetaminophen   Tablet. 650 milliGRAM(s) Oral every 6 hours PRN Moderate Pain (4 - 6)  dextrose Gel 1 Dose(s) Oral once PRN Blood Glucose LESS THAN 70 milliGRAM(s)/deciliter  glucagon  Injectable 1 milliGRAM(s) IntraMuscular once PRN Glucose LESS THAN 70 milligrams/deciliter  ondansetron Injectable 4 milliGRAM(s) IV Push every 6 hours PRN Nausea      Allergies    No Known Allergies    Intolerances        Vital Signs Last 24 Hrs  T(C): 36.3 (30 Mar 2018 08:05), Max: 37 (29 Mar 2018 14:15)  T(F): 97.4 (30 Mar 2018 08:05), Max: 98.6 (29 Mar 2018 14:15)  HR: 67 (30 Mar 2018 08:05) (64 - 80)  BP: 142/72 (30 Mar 2018 08:05) (127/68 - 168/86)  BP(mean): --  RR: 18 (30 Mar 2018 08:05) (17 - 18)  SpO2: 97% (30 Mar 2018 08:05) (94% - 98%)  Daily     Daily     PHYSICAL EXAM:  GENERAL: NAD  NECK: Supple, No JVD  NERVOUS SYSTEM:  Alert & Oriented X3  CHEST/LUNG: Clear bilaterally   HEART: Regular rate and rhythm; No rub  ABDOMEN: Soft, +BS  EXTREMITIES:  2+ Peripheral Pulses, tr edema; L AVF +thrill +bruit  SKIN: No rashes or lesions      LABS:                        9.3    9.3   )-----------( 475      ( 29 Mar 2018 08:34 )             29.2     03-29    127<L>  |  89<L>  |  31.0<H>  ----------------------------<  242<H>  4.9   |  25.0  |  4.15<H>    Ca    8.2<L>      29 Mar 2018 08:34                  RADIOLOGY & ADDITIONAL TESTS:

## 2018-03-30 NOTE — PROGRESS NOTE ADULT - PROBLEM SELECTOR PLAN 2
now progressed to ESRD requiring HD: with uremic pericarditis requiring dialysis as mentioned above (c/w tx with colchicine bid for a total of 4 weeks which will end on 4/5)  -c/w standard HD sessions   -Acute hepatitis panel negative; ppd at 72 hours 0mm negative  -Permacath on right side, neck. Placed  03/22/2018  -s/p AVF/AVG on left arm: Brachial artery to Axillary vein on 03/28/2018 by vascular  -repeat hep panel ordered for HD placement

## 2018-03-30 NOTE — PROGRESS NOTE ADULT - PROBLEM SELECTOR PLAN 7
No chemical ac given severe anemia   SCDs while in bed  Ambulate with assistance SCDs while in bed  Ambulate with assistance

## 2018-03-30 NOTE — PROGRESS NOTE ADULT - SUBJECTIVE AND OBJECTIVE BOX
[facility]                                                        Office: [address] [address]                                                       Telephone: [phone]. Fax:[phone]      CARDIOLOGY PROGRESS NOTE   ([facility])    Subjective:   CURRENT MEDICATIONS  carvedilol 12.5 milliGRAM(s) Oral every 12 hours  cloNIDine 0.2 milliGRAM(s) Oral two times a day  hydrALAZINE 50 milliGRAM(s) Oral every 8 hours        acetaminophen   Tablet 650 milliGRAM(s) Oral every 6 hours PRN  acetaminophen   Tablet. 650 milliGRAM(s) Oral every 6 hours PRN  ondansetron Injectable 4 milliGRAM(s) IV Push every 6 hours PRN    docusate sodium 100 milliGRAM(s) Oral daily  pantoprazole    Tablet 40 milliGRAM(s) Oral before breakfast  senna 2 Tablet(s) Oral at bedtime    colchicine 0.6 milliGRAM(s) Oral two times a day  dextrose 50% Injectable 12.5 Gram(s) IV Push once  dextrose 50% Injectable 25 Gram(s) IV Push once  dextrose 50% Injectable 25 Gram(s) IV Push once  dextrose Gel 1 Dose(s) Oral once PRN  glucagon  Injectable 1 milliGRAM(s) IntraMuscular once PRN  insulin lispro (HumaLOG) corrective regimen sliding scale   SubCutaneous three times a day before meals  simvastatin 40 milliGRAM(s) Oral at bedtime    artificial  tears Solution 1 Drop(s) Both EYES every 12 hours  aspirin enteric coated 81 milliGRAM(s) Oral daily  enoxaparin Injectable 60 milliGRAM(s) SubCutaneous every 24 hours  epoetin gian Injectable 19892 Unit(s) IV Push <User Schedule>  ergocalciferol 89236 Unit(s) Oral every week  warfarin 5 milliGRAM(s) Oral once    	  TELEMETRY:   Vitals:  T(C): 36.3 (03-30-18 @ 08:05), Max: 36.4 (03-30-18 @ 00:18)  HR: 67 (03-30-18 @ 08:05) (64 - 69)  BP: 142/72 (03-30-18 @ 08:05) (127/68 - 168/86)  RR: 18 (03-30-18 @ 08:05) (17 - 18)  SpO2: 97% (03-30-18 @ 08:05) (94% - 98%)  Wt(kg): --  I&O's Summary    29 Mar 2018 07:01  -  30 Mar 2018 07:00  --------------------------------------------------------  IN: 0 mL / OUT: 2000 mL / NET: -2000 mL        Daily T(C): 36.3 (03-30-18 @ 08:05), Max: 36.4 (03-30-18 @ 00:18)  HR: 67 (03-30-18 @ 08:05) (64 - 69)  BP: 142/72 (03-30-18 @ 08:05) (127/68 - 168/86)  RR: 18 (03-30-18 @ 08:05) (17 - 18)  SpO2: 97% (03-30-18 @ 08:05) (94% - 98%)  Wt(kg): --    Daily     PHYSICAL EXAM:  Appearance: Normal	  HEENT:   Normal oral mucosa, PERRL, EOMI	  Lymphatic: No lymphadenopathy  Cardiovascular: Normal S1 S2, No JVD, No murmurs, No edema  Respiratory: Lungs clear to auscultation	  Psychiatry: A & O x 3, Mood & affect appropriate  Gastrointestinal:  Soft, Non-tender, + BS	  Skin: No rashes, No ecchymoses, No cyanosis  Neurologic: Non-focal  Extremities: Normal range of motion, No clubbing, cyanosis or edema  Vascular: Peripheral pulses palpable 2+ bilaterally      ECG (tracing reviewed by me):  	    DIAGNOSTIC TESTING:  Echocardiogram (images reviewed by me):  Catheterization:  Stress Test:    CXR (image reviewed by me):  OTHER: 	    LABS:	 	  CARDIAC MARKERS:                                  9.3    9.3   )-----------( 475      ( 29 Mar 2018 08:34 )             29.2     03-29    127<L>  |  89<L>  |  31.0<H>  ----------------------------<  242<H>  4.9   |  25.0  |  4.15<H>    Ca    8.2<L>      29 Mar 2018 08:34      BNP:   Lipid Profile:   HgA1c:   TSH: [facility]                                                        Office: [address] [address]                                                       Telephone: [phone]. Fax:[phone]      CARDIOLOGY PROGRESS NOTE   ([facility])    Subjective: Patient seen and examined at bedside.  Denies chest pain, shortness of breath, palpitations, dizziness, nausea, bleeding. Hungry.      CURRENT MEDICATIONS  carvedilol 12.5 milliGRAM(s) Oral every 12 hours  cloNIDine 0.2 milliGRAM(s) Oral two times a day  hydrALAZINE 50 milliGRAM(s) Oral every 8 hours  acetaminophen   Tablet 650 milliGRAM(s) Oral every 6 hours PRN  acetaminophen   Tablet. 650 milliGRAM(s) Oral every 6 hours PRN  ondansetron Injectable 4 milliGRAM(s) IV Push every 6 hours PRN  docusate sodium 100 milliGRAM(s) Oral daily  pantoprazole    Tablet 40 milliGRAM(s) Oral before breakfast  senna 2 Tablet(s) Oral at bedtime  colchicine 0.6 milliGRAM(s) Oral two times a day  dextrose 50% Injectable 12.5 Gram(s) IV Push once  dextrose 50% Injectable 25 Gram(s) IV Push once  dextrose 50% Injectable 25 Gram(s) IV Push once  dextrose Gel 1 Dose(s) Oral once PRN  glucagon  Injectable 1 milliGRAM(s) IntraMuscular once PRN  insulin lispro (HumaLOG) corrective regimen sliding scale   SubCutaneous three times a day before meals  simvastatin 40 milliGRAM(s) Oral at bedtime  artificial  tears Solution 1 Drop(s) Both EYES every 12 hours  aspirin enteric coated 81 milliGRAM(s) Oral daily  enoxaparin Injectable 60 milliGRAM(s) SubCutaneous every 24 hours  epoetin gian Injectable 87774 Unit(s) IV Push <User Schedule>  ergocalciferol 46698 Unit(s) Oral every week  warfarin 5 milliGRAM(s) Oral once  	  TELEMETRY: n/a  Vitals:  T(C): 36.3 (18 @ 08:05), Max: 36.4 (18 @ 00:18)  HR: 67 (18 @ 08:05) (64 - 69)  BP: 142/72 (18 @ 08:05) (127/68 - 168/86)  RR: 18 (18 @ 08:05) (17 - 18)  SpO2: 97% (18 @ 08:05) (94% - 98%)  Wt(kg): --  I&O's Summary    29 Mar 2018 07:01  -  30 Mar 2018 07:00  --------------------------------------------------------  IN: 0 mL / OUT: 2000 mL / NET: -2000 mL    PHYSICAL EXAM:  Appearance: NAD	  HEENT:   Normal oral mucosa, PERRL, EOMI	  Lymphatic: No lymphadenopathy  Cardiovascular: Normal S1 S2, No JVD, No murmurs, No edema  Respiratory: Lungs clear to auscultation	  Psychiatry: A & O x 3, Mood & affect appropriate  Gastrointestinal:  Soft, Non-tender, + BS	  Skin: No rashes, right arm ecchymoses, No cyanosis  Neurologic: Non-focal  Extremities: Normal range of motion, No clubbing, cyanosis or edema  Vascular: Peripheral pulses palpable 2+ bilaterally, warm      DIAGNOSTIC TESTIN.3    9.3   )-----------( 475      ( 29 Mar 2018 08:34 )             29.2     03-    127<L>  |  89<L>  |  31.0<H>  ----------------------------<  242<H>  4.9   |  25.0  |  4.15<H>    Ca    8.2<L>      29 Mar 2018 08:34

## 2018-03-30 NOTE — PROGRESS NOTE ADULT - SUBJECTIVE AND OBJECTIVE BOX
Patient is a 57y old  Female who presents with a chief complaint of Nausea/vomiting and Chest pain (16 Mar 2018 17:20)    INTERVAL HPI/OVERNIGHT EVENTS:  Patient is a 58 y/o Female seen and examined at bedside. Patient underwent A/V fistula yesterday: Brachial artery to Axilary vein on 03/28/2018 on left arm.  She has right arm painful bruising since 4 days ago now markedly improved ipsilateral to catheterization from Friday.  LBM: 03/28/2018. Had HD yesterday. EGD today.     ROS:  Patient denying CP, SOB, palpitations, leg swelling, lightheadedness/dizziness, paresthesias. All other ROS negative     Vital Signs Last 24 Hrs  T(C): 36.6 (29 Mar 2018 07:31), Max: 36.7 (28 Mar 2018 11:18)  T(F): 97.8 (29 Mar 2018 07:31), Max: 98.1 (28 Mar 2018 11:18)  HR: 69 (29 Mar 2018 07:31) (62 - 79)  BP: 139/79 (29 Mar 2018 07:31) (110/68 - 168/86)  BP(mean): 87 (28 Mar 2018 17:00) (87 - 92)  RR: 18 (29 Mar 2018 07:31) (13 - 23)  SpO2: 96% (29 Mar 2018 07:31) (93% - 96%)  c/s 190-192    PHYSICAL EXAM:    GENERAL: NAD, well-groomed, well-developed.  HEENT: EOMI, MMM, PERRLA  CHEST/LUNG:  CTA BL no wheezing, no crackles noted  HEART: S1S2nl, no r/g noted now. + 3/6 SM. No pericardial rub noted   ABDOMEN: Soft, Nontender, Nondistended; Bowel sounds present  EXTREMITIES:  2+ Peripheral Pulses, No clubbing, cyanosis, or edema in b/l LE. RUE with swelling with medial upper arm ecchymosis, fading. Pulses in right arm palpable.   Skin: right sided PC in place (neck). Left arm with surgical wound covered, no active bleeding or discharge noted, no tender to touch.  NERVOUS SYSTEM:  Alert & Oriented X3, Good concentration; Motor Strength 5/5 B/L upper and lower extremities; DTRs 2+ intact and symmetric    CAPILLARY BLOOD GLUCOSE      POCT Blood Glucose.: 227 mg/dL (29 Mar 2018 21:52)  POCT Blood Glucose.: 192 mg/dL (29 Mar 2018 16:25)  POCT Blood Glucose.: 190 mg/dL (29 Mar 2018 11:52)  POCT Blood Glucose.: 237 mg/dL (29 Mar 2018 07:45)    Labs:                                    9.3    9.3   )-----------( 475      ( 29 Mar 2018 08:34 )             29.2     03-29    127<L>  |  89<L>  |  31.0<H>  ----------------------------<  242<H>  4.9   |  25.0  |  4.15<H>    Ca    8.2<L>      29 Mar 2018 08:34  Phos  1.8     03-28  Mg     1.7     03-28          RADIOLOGY & ADDITIONAL TESTS:       EXAM:  US DPLX [employer] EXT ARTS [employer] RT                        PROCEDURE DATE:  03/25/2018    INTERPRETATION:  HISTORY: Right radial artery catheterization. Pain.   Pseudoaneurysm.  TECHNIQUE: Grey-scale and color-flow Doppler imaging   FINDINGS: No evidence of hematoma or pseudoaneurysm seen. Right radial   and ulnar arteries are patent.  IMPRESSION: No evidence of pseudoaneurysm  NISHA ZAVALA M.D., ATTENDING RADIOLOGIST  This document has been electronically signed. Mar 25 2018  4:24PM      EXAM:  US JOINT NONVASC EXT LTD RT                        PROCEDURE DATE:  03/25/2018    INTERPRETATION:  Clinical information: Worsening pain at site of IV   infiltration in medial aspect of right arm  Sonography of the right upper extremity was performed at the area of   clinical concern.  COMPARISON: None  FINDINGS/  IMPRESSION: No mass, hematoma or fluid collection is identified.  SYLVIE DAILEY M.D., ATTENDING RADIOLOGIST  This document has been electronically signed. Mar 25 2018  4:22PM           EXAM:  US DPX [employer] EXT VEINS LTD RT                        PROCEDURE DATE:  03/25/2018    INTERPRETATION:  Right upper extremity venous Doppler ultrasound   COMPARISON: None.  CLINICAL INFORMATION: Swelling, pain.  FINDINGS:  Right subclavian vein has limited visualization due to catheter placement  The right internal jugular, axillary, brachial, radial and ulnar veins show normal flow and are compressible where feasible, without evidence of DVT.  The cephalic vein is patent and compressible.  The basilic vein contains a focal thrombus within the mid forearm distally .  IMPRESSION:  Superficial basilic vein distal thrombus as described.  No evidence of upper extremity DVT. Incomplete evaluation of subclavian vein due to indwelling catheter.  NISHA ZAVALA M.D., ATTENDING RADIOLOGIST  This document has been electronically signed. Mar 25 2018  4:21PM      Referring Physician:  INDICATIONS: Acute diastolic congestive heart failure.  HISTORY: 56 yo woman with ESKD now on dialysis, admitted with worsening  SOB, orthopnea and anemia. She had bilateral pleural effusion and moderate  pericardial effusion. There was no prior cardiac history. The patient has  hypertension and dialysis-treated renal failure.  PROCEDURE:  --  Left heart catheterization with ventriculography.  --  Left coronary angiography.  --  Right coronary angiography.  TECHNIQUE: The risks and alternatives of the procedures and conscious  sedation were explained to the patient and informed consent was obtained.  Cardiac catheterization performed electively.  Local anesthetic given. Right radial artery access. Left heart  catheterization. Ventriculography was performed. Left coronary artery  angiography. The vessel was injected utilizing a catheter. Right coronary  artery angiography. The vessel was injected utilizing a catheter.  RADIATION EXPOSURE: 3 min.  CONTRAST GIVEN: Omnipaque 60 ml.  MEDICATIONS GIVEN: Verapamil (Isoptin, Calan, Covera), 5 mg, IA. Heparin,  3000 units, IA. 1% Lidocaine, 10 ml, subcutaneously.  VENTRICLES: There were no left ventricular global or regional wall motion  abnormalities. Global left ventricular function was normal. EF calculated  by contrast ventriculography was 55 %.  VALVES: AORTIC VALVE: No significant aortic valve gradient. MITRAL VALVE:  The mitral valve exhibited trivial regurgitation (less than 1+).  CORONARY VESSELS: The coronary circulation is right dominant.  LM:   --  LM: Normal. The vessel was normal sized, not calcified, and not  tortuous. Angiography showed no evidence of disease.  LAD:   --  LAD: Normal. The vessel was normal sized, not calcified, and not  tortuous. Angiography showed minor luminal irregularities with no flow  limiting lesions.  CX:   --  Circumflex: Normal. The vessel was normal sized, not calcified,  and excessively tortuous.Angiography showed no evidence of disease.  RCA:   --  RCA: Normal. The vessel was normal sized, not calcified, and  moderately tortuous. Angiography showed no evidence of disease.  COMPLICATIONS: There were no complications. No complications occurred  during the cath lab visit.  DIAGNOSTIC IMPRESSIONS: There is mild irregularity of the coronary anatomy.  Left ventricular function is normal (LVEF=55%).  DIAGNOSTIC RECOMMENDATIONS: The patient should continue with the present  medications. Patientmanagement should include aggressive medical therapy  and resumption of all previous activities in 2 days.      MEDICATIONS  (STANDING):  artificial  tears Solution 1 Drop(s) Both EYES every 12 hours  aspirin enteric coated 81 milliGRAM(s) Oral daily  carvedilol 12.5 milliGRAM(s) Oral every 12 hours  cloNIDine 0.2 milliGRAM(s) Oral two times a day  colchicine 0.6 milliGRAM(s) Oral two times a day  dextrose 50% Injectable 12.5 Gram(s) IV Push once  dextrose 50% Injectable 25 Gram(s) IV Push once  dextrose 50% Injectable 25 Gram(s) IV Push once  docusate sodium 100 milliGRAM(s) Oral daily  epoetin gian Injectable 03267 Unit(s) IV Push <User Schedule>  ergocalciferol 37744 Unit(s) Oral every week  hydrALAZINE 50 milliGRAM(s) Oral every 8 hours  insulin lispro (HumaLOG) corrective regimen sliding scale   SubCutaneous three times a day before meals  senna 2 Tablet(s) Oral at bedtime  simvastatin 40 milliGRAM(s) Oral at bedtime    MEDICATIONS  (PRN):  acetaminophen   Tablet 650 milliGRAM(s) Oral every 6 hours PRN For Temp greater than 38 C (100.4 F)  acetaminophen   Tablet. 650 milliGRAM(s) Oral every 6 hours PRN Moderate Pain (4 - 6)  dextrose Gel 1 Dose(s) Oral once PRN Blood Glucose LESS THAN 70 milliGRAM(s)/deciliter  glucagon  Injectable 1 milliGRAM(s) IntraMuscular once PRN Glucose LESS THAN 70 milligrams/deciliter  ondansetron Injectable 4 milliGRAM(s) IV Push every 6 hours PRN Nausea Patient is a 57y old  Female who presents with a chief complaint of Nausea/vomiting and Chest pain (16 Mar 2018 17:20)    INTERVAL HPI/OVERNIGHT EVENTS:  Patient is a 56 y/o Female seen and examined at bedside. Patient underwent A/V fistula yesterday: Brachial artery to Axilary vein on 03/28/2018 on left arm.  She has right arm painful bruising since 4 days ago now markedly improved ipsilateral to catheterization from Friday.  LBM: 03/28/2018. Had HD yesterday. EGD today.     ROS:  Patient denying CP, SOB, palpitations, leg swelling, lightheadedness/dizziness, paresthesias. All other ROS negative     Vital Signs Last 24 Hrs  T(C): 36.4 (30 Mar 2018 00:18), Max: 37 (29 Mar 2018 14:15)  T(F): 97.5 (30 Mar 2018 00:18), Max: 98.6 (29 Mar 2018 14:15)  HR: 66 (30 Mar 2018 05:20) (64 - 80)  BP: 168/86 (30 Mar 2018 05:20) (126/66 - 168/86)  BP(mean): --  RR: 18 (30 Mar 2018 00:18) (17 - 18)  SpO2: 94% (30 Mar 2018 00:18) (94% - 98%)    PHYSICAL EXAM:    GENERAL: NAD, well-groomed, well-developed.  HEENT: EOMI, MMM, PERRLA  CHEST/LUNG:  CTA BL no wheezing, no crackles noted  HEART: S1S2nl, no r/g noted now. + 3/6 SM. No pericardial rub noted   ABDOMEN: Soft, Nontender, Nondistended; Bowel sounds present  EXTREMITIES:  2+ Peripheral Pulses, No clubbing, cyanosis, or edema in b/l LE. RUE with swelling with medial upper arm ecchymosis, fading. Pulses in right arm palpable.   Skin: right sided PC in place (neck). Left arm with surgical wound covered, no active bleeding or discharge noted, no tender to touch.  NERVOUS SYSTEM:  Alert & Oriented X3, Good concentration; Motor Strength 5/5 B/L upper and lower extremities; DTRs 2+ intact and symmetric    CAPILLARY BLOOD GLUCOSE      POCT Blood Glucose.: 227 mg/dL (29 Mar 2018 21:52)  POCT Blood Glucose.: 192 mg/dL (29 Mar 2018 16:25)  POCT Blood Glucose.: 190 mg/dL (29 Mar 2018 11:52)  POCT Blood Glucose.: 237 mg/dL (29 Mar 2018 07:45)    Labs:                                    9.3    9.3   )-----------( 475      ( 29 Mar 2018 08:34 )             29.2     03-29    127<L>  |  89<L>  |  31.0<H>  ----------------------------<  242<H>  4.9   |  25.0  |  4.15<H>    Ca    8.2<L>      29 Mar 2018 08:34  Phos  1.8     03-28  Mg     1.7     03-28          RADIOLOGY & ADDITIONAL TESTS:       EXAM:  US DPLX [employer] EXT ARTS [employer] RT                        PROCEDURE DATE:  03/25/2018    INTERPRETATION:  HISTORY: Right radial artery catheterization. Pain.   Pseudoaneurysm.  TECHNIQUE: Grey-scale and color-flow Doppler imaging   FINDINGS: No evidence of hematoma or pseudoaneurysm seen. Right radial   and ulnar arteries are patent.  IMPRESSION: No evidence of pseudoaneurysm  NISHA ZAVALA M.D., ATTENDING RADIOLOGIST  This document has been electronically signed. Mar 25 2018  4:24PM      EXAM:  US JOINT NONVASC EXT LTD RT                        PROCEDURE DATE:  03/25/2018    INTERPRETATION:  Clinical information: Worsening pain at site of IV   infiltration in medial aspect of right arm  Sonography of the right upper extremity was performed at the area of   clinical concern.  COMPARISON: None  FINDINGS/  IMPRESSION: No mass, hematoma or fluid collection is identified.  SYLVIE DAILEY M.D., ATTENDING RADIOLOGIST  This document has been electronically signed. Mar 25 2018  4:22PM           EXAM:  US DPX [employer] EXT VEINS LTD RT                        PROCEDURE DATE:  03/25/2018    INTERPRETATION:  Right upper extremity venous Doppler ultrasound   COMPARISON: None.  CLINICAL INFORMATION: Swelling, pain.  FINDINGS:  Right subclavian vein has limited visualization due to catheter placement  The right internal jugular, axillary, brachial, radial and ulnar veins show normal flow and are compressible where feasible, without evidence of DVT.  The cephalic vein is patent and compressible.  The basilic vein contains a focal thrombus within the mid forearm distally .  IMPRESSION:  Superficial basilic vein distal thrombus as described.  No evidence of upper extremity DVT. Incomplete evaluation of subclavian vein due to indwelling catheter.  NISHA ZAVALA M.D., ATTENDING RADIOLOGIST  This document has been electronically signed. Mar 25 2018  4:21PM      Referring Physician:  INDICATIONS: Acute diastolic congestive heart failure.  HISTORY: 58 yo woman with ESKD now on dialysis, admitted with worsening  SOB, orthopnea and anemia. She had bilateral pleural effusion and moderate  pericardial effusion. There was no prior cardiac history. The patient has  hypertension and dialysis-treated renal failure.  PROCEDURE:  --  Left heart catheterization with ventriculography.  --  Left coronary angiography.  --  Right coronary angiography.  TECHNIQUE: The risks and alternatives of the procedures and conscious  sedation were explained to the patient and informed consent was obtained.  Cardiac catheterization performed electively.  Local anesthetic given. Right radial artery access. Left heart  catheterization. Ventriculography was performed. Left coronary artery  angiography. The vessel was injected utilizing a catheter. Right coronary  artery angiography. The vessel was injected utilizing a catheter.  RADIATION EXPOSURE: 3 min.  CONTRAST GIVEN: Omnipaque 60 ml.  MEDICATIONS GIVEN: Verapamil (Isoptin, Calan, Covera), 5 mg, IA. Heparin,  3000 units, IA. 1% Lidocaine, 10 ml, subcutaneously.  VENTRICLES: There were no left ventricular global or regional wall motion  abnormalities. Global left ventricular function was normal. EF calculated  by contrast ventriculography was 55 %.  VALVES: AORTIC VALVE: No significant aortic valve gradient. MITRAL VALVE:  The mitral valve exhibited trivial regurgitation (less than 1+).  CORONARY VESSELS: The coronary circulation is right dominant.  LM:   --  LM: Normal. The vessel was normal sized, not calcified, and not  tortuous. Angiography showed no evidence of disease.  LAD:   --  LAD: Normal. The vessel was normal sized, not calcified, and not  tortuous. Angiography showed minor luminal irregularities with no flow  limiting lesions.  CX:   --  Circumflex: Normal. The vessel was normal sized, not calcified,  and excessively tortuous.Angiography showed no evidence of disease.  RCA:   --  RCA: Normal. The vessel was normal sized, not calcified, and  moderately tortuous. Angiography showed no evidence of disease.  COMPLICATIONS: There were no complications. No complications occurred  during the cath lab visit.  DIAGNOSTIC IMPRESSIONS: There is mild irregularity of the coronary anatomy.  Left ventricular function is normal (LVEF=55%).  DIAGNOSTIC RECOMMENDATIONS: The patient should continue with the present  medications. Patientmanagement should include aggressive medical therapy  and resumption of all previous activities in 2 days.      MEDICATIONS  (STANDING):  artificial  tears Solution 1 Drop(s) Both EYES every 12 hours  aspirin enteric coated 81 milliGRAM(s) Oral daily  carvedilol 12.5 milliGRAM(s) Oral every 12 hours  cloNIDine 0.2 milliGRAM(s) Oral two times a day  colchicine 0.6 milliGRAM(s) Oral two times a day  dextrose 50% Injectable 12.5 Gram(s) IV Push once  dextrose 50% Injectable 25 Gram(s) IV Push once  dextrose 50% Injectable 25 Gram(s) IV Push once  docusate sodium 100 milliGRAM(s) Oral daily  epoetin gian Injectable 78308 Unit(s) IV Push <User Schedule>  ergocalciferol 65864 Unit(s) Oral every week  hydrALAZINE 50 milliGRAM(s) Oral every 8 hours  insulin lispro (HumaLOG) corrective regimen sliding scale   SubCutaneous three times a day before meals  senna 2 Tablet(s) Oral at bedtime  simvastatin 40 milliGRAM(s) Oral at bedtime    MEDICATIONS  (PRN):  acetaminophen   Tablet 650 milliGRAM(s) Oral every 6 hours PRN For Temp greater than 38 C (100.4 F)  acetaminophen   Tablet. 650 milliGRAM(s) Oral every 6 hours PRN Moderate Pain (4 - 6)  dextrose Gel 1 Dose(s) Oral once PRN Blood Glucose LESS THAN 70 milliGRAM(s)/deciliter  glucagon  Injectable 1 milliGRAM(s) IntraMuscular once PRN Glucose LESS THAN 70 milligrams/deciliter  ondansetron Injectable 4 milliGRAM(s) IV Push every 6 hours PRN Nausea Patient is a 57y old  Female who presents with a chief complaint of Nausea/vomiting and Chest pain (16 Mar 2018 17:20)    INTERVAL HPI/OVERNIGHT EVENTS:  Patient is a 56 y/o Female seen and examined at bedside. Patient underwent A/V fistula yesterday: Brachial artery to Axilary vein on 03/28/2018 on left arm.  She has right arm painful bruising since 4 days ago now markedly improved ipsilateral to catheterization from Friday.  LBM: 03/28/2018. Had HD yesterday. EGD today.   This AM she denies complaints, with telephone pacific  at bedside. Says she understands she will undergo EGD today, denies arm pain. No f/c or change since yesterday.     ROS:  Patient denying CP, SOB, palpitations, leg swelling, lightheadedness/dizziness, paresthesias. All other ROS negative     Vital Signs Last 24 Hrs  T(C): 36.4 (30 Mar 2018 00:18), Max: 37 (29 Mar 2018 14:15)  T(F): 97.5 (30 Mar 2018 00:18), Max: 98.6 (29 Mar 2018 14:15)  HR: 66 (30 Mar 2018 05:20) (64 - 80)  BP: 168/86 (30 Mar 2018 05:20) (126/66 - 168/86)  BP(mean): --  RR: 18 (30 Mar 2018 00:18) (17 - 18)  SpO2: 94% (30 Mar 2018 00:18) (94% - 98%)    PHYSICAL EXAM:    GENERAL: NAD, well-groomed, well-developed.  HEENT: EOMI, MMM, PERRLA  CHEST/LUNG:  CTA BL no wheezing, no crackles noted  HEART: S1S2nl, no r/g noted now. + 3/6 SM. No pericardial rub noted   ABDOMEN: Soft, Nontender, Nondistended; Bowel sounds present  EXTREMITIES:  2+ Peripheral Pulses, No clubbing, cyanosis, or edema in b/l LE. RUE with swelling with medial upper arm ecchymosis, fading. Pulses in right arm palpable.   Skin: right sided PC in place (neck). Left arm with surgical wound covered, no active bleeding or discharge noted, no tender to touch.  NERVOUS SYSTEM:  Alert & Oriented X3, Good concentration; Motor Strength 5/5 B/L upper and lower extremities; DTRs 2+ intact and symmetric    CAPILLARY BLOOD GLUCOSE      POCT Blood Glucose.: 227 mg/dL (29 Mar 2018 21:52)  POCT Blood Glucose.: 192 mg/dL (29 Mar 2018 16:25)  POCT Blood Glucose.: 190 mg/dL (29 Mar 2018 11:52)  POCT Blood Glucose.: 237 mg/dL (29 Mar 2018 07:45)    Labs:                                    9.3    9.3   )-----------( 475      ( 29 Mar 2018 08:34 )             29.2     03-29    127<L>  |  89<L>  |  31.0<H>  ----------------------------<  242<H>  4.9   |  25.0  |  4.15<H>    Ca    8.2<L>      29 Mar 2018 08:34  Phos  1.8     03-28  Mg     1.7     03-28          RADIOLOGY & ADDITIONAL TESTS:       EXAM:  US DPLX [employer] ARTS [employer] RT                        PROCEDURE DATE:  03/25/2018    INTERPRETATION:  HISTORY: Right radial artery catheterization. Pain.   Pseudoaneurysm.  TECHNIQUE: Grey-scale and color-flow Doppler imaging   FINDINGS: No evidence of hematoma or pseudoaneurysm seen. Right radial   and ulnar arteries are patent.  IMPRESSION: No evidence of pseudoaneurysm  NISHA ZAVALA M.D., ATTENDING RADIOLOGIST  This document has been electronically signed. Mar 25 2018  4:24PM      EXAM:  US JOINT NONVASC EXT LTD RT                        PROCEDURE DATE:  03/25/2018    INTERPRETATION:  Clinical information: Worsening pain at site of IV   infiltration in medial aspect of right arm  Sonography of the right upper extremity was performed at the area of   clinical concern.  COMPARISON: None  FINDINGS/  IMPRESSION: No mass, hematoma or fluid collection is identified.  SYLVIE DAILEY M.D., ATTENDING RADIOLOGIST  This document has been electronically signed. Mar 25 2018  4:22PM           EXAM:  US [employer] EXT VEINS LTD RT                        PROCEDURE DATE:  03/25/2018    INTERPRETATION:  Right upper extremity venous Doppler ultrasound   COMPARISON: None.  CLINICAL INFORMATION: Swelling, pain.  FINDINGS:  Right subclavian vein has limited visualization due to catheter placement  The right internal jugular, axillary, brachial, radial and ulnar veins show normal flow and are compressible where feasible, without evidence of DVT.  The cephalic vein is patent and compressible.  The basilic vein contains a focal thrombus within the mid forearm distally .  IMPRESSION:  Superficial basilic vein distal thrombus as described.  No evidence of upper extremity DVT. Incomplete evaluation of subclavian vein due to indwelling catheter.  NISHA ZAVALA M.D., ATTENDING RADIOLOGIST  This document has been electronically signed. Mar 25 2018  4:21PM      Referring Physician:  INDICATIONS: Acute diastolic congestive heart failure.  HISTORY: 56 yo woman with ESKD now on dialysis, admitted with worsening  SOB, orthopnea and anemia. She had bilateral pleural effusion and moderate  pericardial effusion. There was no prior cardiac history. The patient has  hypertension and dialysis-treated renal failure.  PROCEDURE:  --  Left heart catheterization with ventriculography.  --  Left coronary angiography.  --  Right coronary angiography.  TECHNIQUE: The risks and alternatives of the procedures and conscious  sedation were explained to the patient and informed consent was obtained.  Cardiac catheterization performed electively.  Local anesthetic given. Right radial artery access. Left heart  catheterization. Ventriculography was performed. Left coronary artery  angiography. The vessel was injected utilizing a catheter. Right coronary  artery angiography. The vessel was injected utilizing a catheter.  RADIATION EXPOSURE: 3 min.  CONTRAST GIVEN: Omnipaque 60 ml.  MEDICATIONS GIVEN: Verapamil (Isoptin, Calan, Covera), 5 mg, IA. Heparin,  3000 units, IA. 1% Lidocaine, 10 ml, subcutaneously.  VENTRICLES: There were no left ventricular global or regional wall motion  abnormalities. Global left ventricular function was normal. EF calculated  by contrast ventriculography was 55 %.  VALVES: AORTIC VALVE: No significant aortic valve gradient. MITRAL VALVE:  The mitral valve exhibited trivial regurgitation (less than 1+).  CORONARY VESSELS: The coronary circulation is right dominant.  LM:   --  LM: Normal. The vessel was normal sized, not calcified, and not  tortuous. Angiography showed no evidence of disease.  LAD:   --  LAD: Normal. The vessel was normal sized, not calcified, and not  tortuous. Angiography showed minor luminal irregularities with no flow  limiting lesions.  CX:   --  Circumflex: Normal. The vessel was normal sized, not calcified,  and excessively tortuous.Angiography showed no evidence of disease.  RCA:   --  RCA: Normal. The vessel was normal sized, not calcified, and  moderately tortuous. Angiography showed no evidence of disease.  COMPLICATIONS: There were no complications. No complications occurred  during the cath lab visit.  DIAGNOSTIC IMPRESSIONS: There is mild irregularity of the coronary anatomy.  Left ventricular function is normal (LVEF=55%).  DIAGNOSTIC RECOMMENDATIONS: The patient should continue with the present  medications. Patientmanagement should include aggressive medical therapy  and resumption of all previous activities in 2 days.      MEDICATIONS  (STANDING):  artificial  tears Solution 1 Drop(s) Both EYES every 12 hours  aspirin enteric coated 81 milliGRAM(s) Oral daily  carvedilol 12.5 milliGRAM(s) Oral every 12 hours  cloNIDine 0.2 milliGRAM(s) Oral two times a day  colchicine 0.6 milliGRAM(s) Oral two times a day  dextrose 50% Injectable 12.5 Gram(s) IV Push once  dextrose 50% Injectable 25 Gram(s) IV Push once  dextrose 50% Injectable 25 Gram(s) IV Push once  docusate sodium 100 milliGRAM(s) Oral daily  epoetin gian Injectable 60720 Unit(s) IV Push <User Schedule>  ergocalciferol 45926 Unit(s) Oral every week  hydrALAZINE 50 milliGRAM(s) Oral every 8 hours  insulin lispro (HumaLOG) corrective regimen sliding scale   SubCutaneous three times a day before meals  senna 2 Tablet(s) Oral at bedtime  simvastatin 40 milliGRAM(s) Oral at bedtime    MEDICATIONS  (PRN):  acetaminophen   Tablet 650 milliGRAM(s) Oral every 6 hours PRN For Temp greater than 38 C (100.4 F)  acetaminophen   Tablet. 650 milliGRAM(s) Oral every 6 hours PRN Moderate Pain (4 - 6)  dextrose Gel 1 Dose(s) Oral once PRN Blood Glucose LESS THAN 70 milliGRAM(s)/deciliter  glucagon  Injectable 1 milliGRAM(s) IntraMuscular once PRN Glucose LESS THAN 70 milligrams/deciliter  ondansetron Injectable 4 milliGRAM(s) IV Push every 6 hours PRN Nausea Patient is a 57y old  Female who presents with a chief complaint of Nausea/vomiting and Chest pain (16 Mar 2018 17:20)    INTERVAL HPI/OVERNIGHT EVENTS:  Patient is a 56 y/o Female seen and examined at bedside. No acute distress. Patient underwent A/V fistula on 3/28: Brachial artery to Axilary vein on 03/28/2018 on left arm.  She has right arm painful bruising since 4 days ago now markedly improved ipsilateral to catheterization from Friday.  LBM: 03/28/2018. Had HD yesterday. EGD today.   This AM she denies complaints, with telephone pacific  at bedside. Says she understands she will undergo EGD today, denies arm pain. No f/c or change since yesterday.     ROS:  Patient denying CP, SOB, palpitations, leg swelling, lightheadedness/dizziness, paresthesias. All other ROS negative     Vital Signs Last 24 Hrs  T(C): 36.4 (30 Mar 2018 00:18), Max: 37 (29 Mar 2018 14:15)  T(F): 97.5 (30 Mar 2018 00:18), Max: 98.6 (29 Mar 2018 14:15)  HR: 66 (30 Mar 2018 05:20) (64 - 80)  BP: 168/86 (30 Mar 2018 05:20) (126/66 - 168/86)  BP(mean): --  RR: 18 (30 Mar 2018 00:18) (17 - 18)  SpO2: 94% (30 Mar 2018 00:18) (94% - 98%)  c/s 227-212-207    PHYSICAL EXAM:    GENERAL: NAD, well-groomed, well-developed.  HEENT: EOMI, MMM, PERRLA  CHEST/LUNG:  CTA BL no wheezing, no crackles noted  HEART: S1S2nl, no r/g noted now. + 3/6 SM. No pericardial rub noted   ABDOMEN: Soft, Nontender, Nondistended; Bowel sounds present  EXTREMITIES:  2+ Peripheral Pulses, No clubbing, cyanosis, or edema in b/l LE. LUE with swelling with medial upper arm ecchymosis, fading. Pulses in both left and right arm palpable.   Skin: right sided PC in place (neck). Left arm with surgical wound covered, no active bleeding or discharge noted, no tender to touch.  NERVOUS SYSTEM:  Alert & Oriented X3, Good concentration; Motor Strength 5/5 B/L upper and lower extremities; DTRs 2+ intact and symmetric    CAPILLARY BLOOD GLUCOSE  POCT Blood Glucose.: 227 mg/dL (29 Mar 2018 21:52)  POCT Blood Glucose.: 192 mg/dL (29 Mar 2018 16:25)  POCT Blood Glucose.: 190 mg/dL (29 Mar 2018 11:52)  POCT Blood Glucose.: 237 mg/dL (29 Mar 2018 07:45)    Labs:                                    9.3    9.3   )-----------( 475      ( 29 Mar 2018 08:34 )             29.2     03-29    127<L>  |  89<L>  |  31.0<H>  ----------------------------<  242<H>  4.9   |  25.0  |  4.15<H>    Ca    8.2<L>      29 Mar 2018 08:34  Phos  1.8     03-28  Mg     1.7     03-28          RADIOLOGY & ADDITIONAL TESTS:       EXAM:  US DPLX [employer] EXT ARTS [employer] Cocktail RT                        PROCEDURE DATE:  03/25/2018    INTERPRETATION:  HISTORY: Right radial artery catheterization. Pain.   Pseudoaneurysm.  TECHNIQUE: Grey-scale and color-flow Doppler imaging   FINDINGS: No evidence of hematoma or pseudoaneurysm seen. Right radial   and ulnar arteries are patent.  IMPRESSION: No evidence of pseudoaneurysm  NISHA ZAVALA M.D., ATTENDING RADIOLOGIST  This document has been electronically signed. Mar 25 2018  4:24PM      EXAM:  US JOINT NONVASC EXT LTD RT                        PROCEDURE DATE:  03/25/2018    INTERPRETATION:  Clinical information: Worsening pain at site of IV   infiltration in medial aspect of right arm  Sonography of the right upper extremity was performed at the area of   clinical concern.  COMPARISON: None  FINDINGS/  IMPRESSION: No mass, hematoma or fluid collection is identified.  SYLVIE DAILEY M.D., ATTENDING RADIOLOGIST  This document has been electronically signed. Mar 25 2018  4:22PM           EXAM:  US DPLX UPR EXT VEINS LTD RT                        PROCEDURE DATE:  03/25/2018    INTERPRETATION:  Right upper extremity venous Doppler ultrasound   COMPARISON: None.  CLINICAL INFORMATION: Swelling, pain.  FINDINGS:  Right subclavian vein has limited visualization due to catheter placement  The right internal jugular, axillary, brachial, radial and ulnar veins show normal flow and are compressible where feasible, without evidence of DVT.  The cephalic vein is patent and compressible.  The basilic vein contains a focal thrombus within the mid forearm distally .  IMPRESSION:  Superficial basilic vein distal thrombus as described.  No evidence of upper extremity DVT. Incomplete evaluation of subclavian vein due to indwelling catheter.  NISHA ZAVALA M.D., ATTENDING RADIOLOGIST  This document has been electronically signed. Mar 25 2018  4:21PM      Referring Physician:  INDICATIONS: Acute diastolic congestive heart failure.  HISTORY: 56 yo woman with ESKD now on dialysis, admitted with worsening  SOB, orthopnea and anemia. She had bilateral pleural effusion and moderate  pericardial effusion. There was no prior cardiac history. The patient has  hypertension and dialysis-treated renal failure.  PROCEDURE:  --  Left heart catheterization with ventriculography.  --  Left coronary angiography.  --  Right coronary angiography.  TECHNIQUE: The risks and alternatives of the procedures and conscious  sedation were explained to the patient and informed consent was obtained.  Cardiac catheterization performed electively.  Local anesthetic given. Right radial artery access. Left heart  catheterization. Ventriculography was performed. Left coronary artery  angiography. The vessel was injected utilizing a catheter. Right coronary  artery angiography. The vessel was injected utilizing a catheter.  RADIATION EXPOSURE: 3 min.  CONTRAST GIVEN: Omnipaque 60 ml.  MEDICATIONS GIVEN: Verapamil (Isoptin, Calan, Covera), 5 mg, IA. Heparin,  3000 units, IA. 1% Lidocaine, 10 ml, subcutaneously.  VENTRICLES: There were no left ventricular global or regional wall motion  abnormalities. Global left ventricular function was normal. EF calculated  by contrast ventriculography was 55 %.  VALVES: AORTIC VALVE: No significant aortic valve gradient. MITRAL VALVE:  The mitral valve exhibited trivial regurgitation (less than 1+).  CORONARY VESSELS: The coronary circulation is right dominant.  LM:   --  LM: Normal. The vessel was normal sized, not calcified, and not  tortuous. Angiography showed no evidence of disease.  LAD:   --  LAD: Normal. The vessel was normal sized, not calcified, and not  tortuous. Angiography showed minor luminal irregularities with no flow  limiting lesions.  CX:   --  Circumflex: Normal. The vessel was normal sized, not calcified,  and excessively tortuous.Angiography showed no evidence of disease.  RCA:   --  RCA: Normal. The vessel was normal sized, not calcified, and  moderately tortuous. Angiography showed no evidence of disease.  COMPLICATIONS: There were no complications. No complications occurred  during the cath lab visit.  DIAGNOSTIC IMPRESSIONS: There is mild irregularity of the coronary anatomy.  Left ventricular function is normal (LVEF=55%).  DIAGNOSTIC RECOMMENDATIONS: The patient should continue with the present  medications. Patientmanagement should include aggressive medical therapy  and resumption of all previous activities in 2 days.      MEDICATIONS  (STANDING):  artificial  tears Solution 1 Drop(s) Both EYES every 12 hours  aspirin enteric coated 81 milliGRAM(s) Oral daily  carvedilol 12.5 milliGRAM(s) Oral every 12 hours  cloNIDine 0.2 milliGRAM(s) Oral two times a day  colchicine 0.6 milliGRAM(s) Oral two times a day  dextrose 50% Injectable 12.5 Gram(s) IV Push once  dextrose 50% Injectable 25 Gram(s) IV Push once  dextrose 50% Injectable 25 Gram(s) IV Push once  docusate sodium 100 milliGRAM(s) Oral daily  epoetin gian Injectable 11508 Unit(s) IV Push <User Schedule>  ergocalciferol 52561 Unit(s) Oral every week  hydrALAZINE 50 milliGRAM(s) Oral every 8 hours  insulin lispro (HumaLOG) corrective regimen sliding scale   SubCutaneous three times a day before meals  senna 2 Tablet(s) Oral at bedtime  simvastatin 40 milliGRAM(s) Oral at bedtime    MEDICATIONS  (PRN):  acetaminophen   Tablet 650 milliGRAM(s) Oral every 6 hours PRN For Temp greater than 38 C (100.4 F)  acetaminophen   Tablet. 650 milliGRAM(s) Oral every 6 hours PRN Moderate Pain (4 - 6)  dextrose Gel 1 Dose(s) Oral once PRN Blood Glucose LESS THAN 70 milliGRAM(s)/deciliter  glucagon  Injectable 1 milliGRAM(s) IntraMuscular once PRN Glucose LESS THAN 70 milligrams/deciliter  ondansetron Injectable 4 milliGRAM(s) IV Push every 6 hours PRN Nausea

## 2018-03-30 NOTE — PROGRESS NOTE ADULT - ATTENDING COMMENTS
Note addended where needed. Plan discussed with patient at bedside in [place]. Her daughter in law was called to update and my cell left for her to call back. Spoke to Dr Lopez regarding planning of care and hospital stay with tentative d/c plan

## 2018-03-30 NOTE — PROGRESS NOTE ADULT - PROBLEM SELECTOR PLAN 4
-No further episodes of AFib  -no AC at this time until GI work up completed  [facility] Cardiology following and cleared for endoscopy on 03/25/2018  Rates well controlled on b blocker  s/p negative cath; plan for GERALD prior to d/c

## 2018-03-30 NOTE — PROGRESS NOTE ADULT - PROBLEM SELECTOR PLAN 6
HBA1C: 7.6   continue sliding scale. 2 UI required in 24 hrs. Will consider other coverage if needed  -c/w Accu-Cheks ac hs tid   -c/w hypoglycemia protocol HBA1C: 7.6   continue sliding scale for now. Patient is nPO for procedure today. c/s in the 200s today, will await over the wknd to decide on oral agents given mildly elevated c/s since she has been NPO on and off   -c/w Accu-Cheks ac hs tid   -c/w hypoglycemia protocol

## 2018-03-31 DIAGNOSIS — I82.90 ACUTE EMBOLISM AND THROMBOSIS OF UNSPECIFIED VEIN: ICD-10-CM

## 2018-03-31 LAB
APTT BLD: 28.8 SEC — SIGNIFICANT CHANGE UP (ref 27.5–37.4)
APTT BLD: 36.2 SEC — SIGNIFICANT CHANGE UP (ref 27.5–37.4)
GLUCOSE BLDC GLUCOMTR-MCNC: 150 MG/DL — HIGH (ref 70–99)
GLUCOSE BLDC GLUCOMTR-MCNC: 193 MG/DL — HIGH (ref 70–99)
GLUCOSE BLDC GLUCOMTR-MCNC: 208 MG/DL — HIGH (ref 70–99)
GLUCOSE BLDC GLUCOMTR-MCNC: 241 MG/DL — HIGH (ref 70–99)
GLUCOSE BLDC GLUCOMTR-MCNC: 247 MG/DL — HIGH (ref 70–99)
INR BLD: 1.05 RATIO — SIGNIFICANT CHANGE UP (ref 0.88–1.16)
INR BLD: 1.08 RATIO — SIGNIFICANT CHANGE UP (ref 0.88–1.16)
PROTHROM AB SERPL-ACNC: 11.6 SEC — SIGNIFICANT CHANGE UP (ref 9.8–12.7)
PROTHROM AB SERPL-ACNC: 11.9 SEC — SIGNIFICANT CHANGE UP (ref 9.8–12.7)

## 2018-03-31 PROCEDURE — 99232 SBSQ HOSP IP/OBS MODERATE 35: CPT

## 2018-03-31 PROCEDURE — 99233 SBSQ HOSP IP/OBS HIGH 50: CPT | Mod: GC

## 2018-03-31 RX ORDER — WARFARIN SODIUM 2.5 MG/1
5 TABLET ORAL ONCE
Qty: 0 | Refills: 0 | Status: COMPLETED | OUTPATIENT
Start: 2018-03-31 | End: 2018-03-31

## 2018-03-31 RX ADMIN — Medication 0.6 MILLIGRAM(S): at 05:50

## 2018-03-31 RX ADMIN — Medication 0.6 MILLIGRAM(S): at 22:00

## 2018-03-31 RX ADMIN — Medication 2: at 08:35

## 2018-03-31 RX ADMIN — Medication 2: at 11:41

## 2018-03-31 RX ADMIN — Medication 1 DROP(S): at 05:49

## 2018-03-31 RX ADMIN — Medication 100 MILLIGRAM(S): at 11:41

## 2018-03-31 RX ADMIN — Medication 0.2 MILLIGRAM(S): at 18:04

## 2018-03-31 RX ADMIN — SIMVASTATIN 40 MILLIGRAM(S): 20 TABLET, FILM COATED ORAL at 22:00

## 2018-03-31 RX ADMIN — Medication 1 DROP(S): at 22:00

## 2018-03-31 RX ADMIN — Medication 50 MILLIGRAM(S): at 05:50

## 2018-03-31 RX ADMIN — PANTOPRAZOLE SODIUM 40 MILLIGRAM(S): 20 TABLET, DELAYED RELEASE ORAL at 08:35

## 2018-03-31 RX ADMIN — CARVEDILOL PHOSPHATE 12.5 MILLIGRAM(S): 80 CAPSULE, EXTENDED RELEASE ORAL at 22:00

## 2018-03-31 RX ADMIN — ERYTHROPOIETIN 10000 UNIT(S): 10000 INJECTION, SOLUTION INTRAVENOUS; SUBCUTANEOUS at 15:46

## 2018-03-31 RX ADMIN — Medication 50 MILLIGRAM(S): at 14:01

## 2018-03-31 RX ADMIN — CARVEDILOL PHOSPHATE 12.5 MILLIGRAM(S): 80 CAPSULE, EXTENDED RELEASE ORAL at 05:50

## 2018-03-31 RX ADMIN — WARFARIN SODIUM 5 MILLIGRAM(S): 2.5 TABLET ORAL at 22:00

## 2018-03-31 RX ADMIN — SENNA PLUS 2 TABLET(S): 8.6 TABLET ORAL at 22:00

## 2018-03-31 RX ADMIN — WARFARIN SODIUM 5 MILLIGRAM(S): 2.5 TABLET ORAL at 01:23

## 2018-03-31 RX ADMIN — Medication 50 MILLIGRAM(S): at 22:00

## 2018-03-31 RX ADMIN — Medication 81 MILLIGRAM(S): at 11:41

## 2018-03-31 RX ADMIN — ENOXAPARIN SODIUM 60 MILLIGRAM(S): 100 INJECTION SUBCUTANEOUS at 01:23

## 2018-03-31 RX ADMIN — Medication 0.2 MILLIGRAM(S): at 05:50

## 2018-03-31 NOTE — PROGRESS NOTE ADULT - PROBLEM SELECTOR PLAN 1
probably due to uremia with gastritis and now resolving. Nothing further to do from GI standpoint. Would continue pantoprazole abd check biopsies and treat H Pylori if present. Ok to anticoagulate from GI standpoint

## 2018-03-31 NOTE — PROGRESS NOTE ADULT - ASSESSMENT
CKD V now ESRD on HD   Fluid overload (pleural and pericardial effusions)==> resolved  - HD today on a TTS schedule  - await maturation of AVF    Anemia: r/o GI blood loss  - trend h/h  - MARGI w HD  - GI -EGD yest    RO: hypophosphatemia  -  binders on hold  - cont Rocaltrol  - trend phosphorous

## 2018-03-31 NOTE — PROGRESS NOTE ADULT - PROBLEM SELECTOR PLAN 4
-No further episodes of AFib  -no AC at this time until GI work up completed  [facility] Cardiology following and cleared for endoscopy on 03/25/2018  Rates well controlled on b blocker  s/p negative cath; plan for GERALD prior to d/c

## 2018-03-31 NOTE — PROGRESS NOTE ADULT - ASSESSMENT
57 y.o. F with hx of HTN, HLD, DMII, hyperkalemia, ckd 3 who presented with 3 weeks of postprandial vomiting and abd pain, noted to have ARF, fluid overload with bilateral pleural effusions and pericardial effusion as well as symptomatic anemia likely multifactorial secondary to acute on chronic renal failure and suspected upper GI Bleeding from vomiting/retching and NSAID use. Patient received 2 units of blood on admission with a good response. A trial of lasix was given and hospital course complicated by finding of uremic pericarditis which led to emergent HD and patient now being on permanent HD.  In the interim while being dialyzed the patient had an episode of PAF, s/p cardizem became rate controlled and remained in NSR. EP consulted. Unable to AC the patient due to initial concern for GI bleeding, for which GI consulted and tentatively GI intervention on 03/29/2018, cleared by cardio on 03/25/218. 4 days ago presented right arm ecchymosis on the cath site (cath was negative). Us of right arm reported superficial basilic vein distal thrombus but no DVT, pseudoaneurysm or hematoma. Now markedly improved. She is s/p A/V fistula on left arm: Brachial artery to Axillary vein (03/28/2018), plan for HD today and EGD tomorrow. as per cardio plan for eventual GERALD.    In hospital procedures:   S/p R thoracocentesis on 3/20 with 1100 cc removed  S/P pleurocentesis done 3/21 on left sided with removal of 1 L. Fluid analysis consistent with transudate   s/p cath on 03/24/2018, NEG, EF 55%  s/p A/V fistula on left arm (03/28/2018)  tentatively GI intervention (EGD) on 03/29/2018,  cleared by cardio on 03/25/218 57 y.o. F with hx of HTN, HLD, DMII, hyperkalemia, ckd 3 who presented with 3 weeks of postprandial vomiting and abd pain, noted to have ARF, fluid overload with bilateral pleural effusions and pericardial effusion as well as symptomatic anemia likely multifactorial secondary to acute on chronic renal failure and suspected upper GI Bleeding from vomiting/retching and NSAID use. Patient received 2 units of blood on admission with a good response. A trial of lasix was given and hospital course complicated by finding of uremic pericarditis which led to emergent HD and patient now being on permanent HD.  In the interim while being dialyzed the patient had an episode of PAF, s/p cardizem became rate controlled and remained in NSR. EP consulted. Unable to AC the patient due to initial concern for GI bleeding, for which GI consulted and tentatively GI intervention on 03/29/2018, cleared by cardio on 03/25/218. 4 days ago presented right arm ecchymosis on the cath site (cath was negative). Us of right arm reported superficial basilic vein distal thrombus but no DVT, pseudoaneurysm or hematoma. Now markedly improved. She is s/p A/V fistula on left arm: Brachial artery to Axillary vein (03/28/2018), plan for HD today and EGD tomorrow. as per cardio plan for eventual GERALD.    In hospital procedures:   S/p R thoracocentesis on 3/20 with 1100 cc removed  S/P pleurocentesis done 3/21 on left sided with removal of 1 L. Fluid analysis consistent with transudate   s/p cath on 03/24/2018, NEG, EF 55%  s/p A/V fistula on left arm (03/28/2018)  tentatively GI intervention (EGD) on 03/29/2018,  cleared by cardio on 03/25/218    Anemia of chronic kidney disease:   -present on admission, stable  -due to worsening acute on chronic renal insufficiency in the setting of CKD with concomitant  iron deficiency   -s/p 4 pRBC total: with the last one being on 3/27 with a good Hgb response   -occult stool neg  -c/w PPI po bid   -sp EGD 3/30, result pending  -continue EPOGEN with HD and ferrous sulfate    Acute on Chronic Renal Failure  -CKD progressed to ESRD requiring HD, noted on arrival  -complicated by uremic pericarditis  -stable  -Permacath on right side, neck. Placed  03/22/2018  -s/p AVF/AVG on left arm: Brachial artery to Axillary vein on 03/28/2018 by vascular  -first ep HD complicated by new onset AFIB, since then has tolerated sessions well  -c/w standard HD sessions   -Acute hepatitis panel negative; ppd at 72 hours 0mm negative  -pending establishment of HD seat in community prior to DC    New Onset A Fib  -No further episodes of AFib since initial episode during HD, terminated by cardizem  -paroxysmal nature, currently in NSR  -pending report from EGD, if no bleeding will start oral AC for stroke ppx  -goal HR <110bpm, controlled well on b blocker  -s/p negative cath; no further plans for GERALD prior to d/c per cardio  -outpt follow up with cardio    Uremic Pericarditis  -present on admission, improved  -sp emergent HD to optimize fluid balance as part of tx  -cardio eval appreciated  -c/w tx with colchicine bid for a total of 4 weeks which will end on 4/5    RUE Basilic Vein Distal Thrombosis  -noted on US done during this admission  -complication of admission  -improved  -given superficial nature, no plans for AC specifically for this issue  -supportive care, warm compresses PRN, LUE elevation    HTN  -goal BP <140/90, uncontrolled but asymptomatic   -c/w clonidine, increased  per renal on 03/27/2018 to 0.2mg BID  -increased hydralazine to 50mg tid doing well  -continue coreg  -HD for continued volume control  -if still unimproved after next HD session will consider inc of clonidine to 0.3mg BID    DM2 on long term insulin therapy complicated by ESRD on HD  -HBA1C: 7.6, goal <7, uncontrolled  -continue sliding scale  -c/w Accu-Cheks ac hs tid   -c/w hypoglycemia protocol  -start lantus 5u, will attempt to reach goal AM fasting BS <200    DVT PPX  SCDs while in bed  Ambulate with assistance 57 y.o. F with hx of HTN, HLD, DMII, hyperkalemia, ckd 3 who presented with 3 weeks of postprandial vomiting and abd pain, noted to have ARF, fluid overload with bilateral pleural effusions and pericardial effusion as well as symptomatic anemia likely multifactorial secondary to acute on chronic renal failure and suspected upper GI Bleeding from vomiting/retching and NSAID use. Patient received 2 units of blood on admission with a good response. A trial of lasix was given and hospital course complicated by finding of uremic pericarditis which led to emergent HD and patient now being on permanent HD.  In the interim while being dialyzed the patient had an episode of PAF, s/p cardizem became rate controlled and remained in NSR. EP consulted. Unable to AC the patient due to initial concern for GI bleeding, for which GI consulted and tentatively GI intervention on 03/29/2018, cleared by cardio on 03/25/218. 4 days ago presented right arm ecchymosis on the cath site (cath was negative). Us of right arm reported superficial basilic vein distal thrombus but no DVT, pseudoaneurysm or hematoma. Now markedly improved. She is s/p A/V fistula on left arm: Brachial artery to Axillary vein (03/28/2018), plan for HD today and EGD tomorrow. as per cardio plan for eventual GERALD.    In hospital procedures:   S/p R thoracocentesis on 3/20 with 1100 cc removed  S/P pleurocentesis done 3/21 on left sided with removal of 1 L. Fluid analysis consistent with transudate   s/p cath on 03/24/2018, NEG, EF 55%  s/p A/V fistula on left arm (03/28/2018)  tentatively GI intervention (EGD) on 03/29/2018,  cleared by cardio on 03/25/218    Acute on Chronic Renal Failure  -CKD progressed to ESRD requiring HD, noted on arrival  -complicated by uremic pericarditis  -stable  -Permacath on right side, neck. Placed  03/22/2018  -s/p AVF/AVG on left arm: Brachial artery to Axillary vein on 03/28/2018 by vascular  -first ep HD complicated by new onset AFIB, since then has tolerated sessions well  -c/w standard HD sessions   -Acute hepatitis panel negative; ppd at 72 hours 0mm negative  -pending establishment of HD seat in community prior to DC    Anemia of chronic kidney disease:   -present on admission, stable  -due to worsening acute on chronic renal insufficiency in the setting of CKD with concomitant  iron deficiency   -s/p 4 pRBC total: with the last one being on 3/27 with a good Hgb response   -occult stool neg  -c/w PPI po bid   -sp EGD 3/30, result pending  -continue EPOGEN with HD and ferrous sulfate    New Onset A Fib  -No further episodes of AFib since initial episode during HD, terminated by cardizem  -paroxysmal nature, currently in NSR  -pending report from EGD, if no bleeding will start oral AC for stroke ppx  -goal HR <110bpm, controlled well on b blocker  -s/p negative cath; no further plans for GERALD prior to d/c per cardio  -outpt follow up with cardio    Uremic Pericarditis  -present on admission, improved  -sp emergent HD to optimize fluid balance as part of tx  -cardio eval appreciated  -c/w tx with colchicine bid for a total of 4 weeks which will end on 4/5    RUE Basilic Vein Distal Thrombosis  -noted on US done during this admission  -complication of admission  -improved  -given superficial nature, no plans for AC specifically for this issue  -supportive care, warm compresses PRN, LUE elevation    HTN  -goal BP <140/90, uncontrolled but asymptomatic   -c/w clonidine, increased  per renal on 03/27/2018 to 0.2mg BID  -increased hydralazine to 50mg tid doing well  -continue coreg  -HD for continued volume control  -if still unimproved after next HD session will consider inc of clonidine to 0.3mg BID    DM2 on long term insulin therapy complicated by ESRD on HD  -HBA1C: 7.6, goal <7, uncontrolled  -continue sliding scale  -c/w Accu-Cheks ac hs tid   -c/w hypoglycemia protocol  -start lantus 5u, will attempt to reach goal AM fasting BS <200    DVT PPX  SCDs while in bed  Ambulate with assistance

## 2018-03-31 NOTE — PROGRESS NOTE ADULT - PROBLEM SELECTOR PLAN 5
-better controlled   -c/w clonidine, increased  per renal on 03/27/2018 to 0.2mg BID  -increased hydralazine to 50mg tid doing well, BP controlled    -continue coreg

## 2018-03-31 NOTE — PROGRESS NOTE ADULT - SUBJECTIVE AND OBJECTIVE BOX
NEPHROLOGY INTERVAL HPI/OVERNIGHT EVENTS:    Examined earlier  HD today    MEDICATIONS  (STANDING):  artificial  tears Solution 1 Drop(s) Both EYES every 12 hours  aspirin enteric coated 81 milliGRAM(s) Oral daily  carvedilol 12.5 milliGRAM(s) Oral every 12 hours  cloNIDine 0.2 milliGRAM(s) Oral two times a day  colchicine 0.6 milliGRAM(s) Oral two times a day  dextrose 50% Injectable 12.5 Gram(s) IV Push once  dextrose 50% Injectable 25 Gram(s) IV Push once  dextrose 50% Injectable 25 Gram(s) IV Push once  docusate sodium 100 milliGRAM(s) Oral daily  enoxaparin Injectable 60 milliGRAM(s) SubCutaneous every 24 hours  epoetin gian Injectable 70126 Unit(s) IV Push <User Schedule>  ergocalciferol 18392 Unit(s) Oral every week  hydrALAZINE 50 milliGRAM(s) Oral every 8 hours  insulin lispro (HumaLOG) corrective regimen sliding scale   SubCutaneous three times a day before meals  pantoprazole    Tablet 40 milliGRAM(s) Oral before breakfast  senna 2 Tablet(s) Oral at bedtime  simvastatin 40 milliGRAM(s) Oral at bedtime    MEDICATIONS  (PRN):  acetaminophen   Tablet 650 milliGRAM(s) Oral every 6 hours PRN For Temp greater than 38 C (100.4 F)  acetaminophen   Tablet. 650 milliGRAM(s) Oral every 6 hours PRN Moderate Pain (4 - 6)  dextrose Gel 1 Dose(s) Oral once PRN Blood Glucose LESS THAN 70 milliGRAM(s)/deciliter  glucagon  Injectable 1 milliGRAM(s) IntraMuscular once PRN Glucose LESS THAN 70 milligrams/deciliter  ondansetron Injectable 4 milliGRAM(s) IV Push every 6 hours PRN Nausea      Allergies    No Known Allergies    Intolerances        Vital Signs Last 24 Hrs  T(C): 36.3 (31 Mar 2018 07:28), Max: 36.5 (30 Mar 2018 17:41)  T(F): 97.3 (31 Mar 2018 07:28), Max: 97.7 (30 Mar 2018 17:41)  HR: 68 (31 Mar 2018 07:28) (68 - 76)  BP: 163/91 (31 Mar 2018 07:28) (155/81 - 163/91)  BP(mean): --  RR: 18 (31 Mar 2018 07:28) (18 - 18)  SpO2: 94% (31 Mar 2018 07:28) (94% - 95%)  Daily     Daily     PHYSICAL EXAM:  GENERAL: NAD  NECK: Supple, No JVD  NERVOUS SYSTEM:  Alert & Oriented X3  CHEST/LUNG: Clear bilaterally   HEART: Regular rate and rhythm; No rub  ABDOMEN: Soft, +BS  EXTREMITIES:  2+ Peripheral Pulses, tr edema; L AVF +thrill +bruit  SKIN: No rashes or lesions      LABS:          PT/INR - ( 31 Mar 2018 07:50 )   PT: 11.9 sec;   INR: 1.08 ratio         PTT - ( 31 Mar 2018 07:50 )  PTT:36.2 sec            RADIOLOGY & ADDITIONAL TESTS:

## 2018-03-31 NOTE — PROGRESS NOTE ADULT - PROBLEM SELECTOR PLAN 6
HBA1C: 7.6   continue sliding scale for now. Patient is NPO for procedure today. c/s in the 200s today, will await over the wknd to decide on oral agents given mildly elevated c/s since she has been NPO on and off   -c/w Accu-Cheks ac hs tid   -c/w hypoglycemia protocol

## 2018-03-31 NOTE — PROGRESS NOTE ADULT - SUBJECTIVE AND OBJECTIVE BOX
Patient is a 57y old  Female who presents with a chief complaint of Nausea/vomiting and Chest pain (16 Mar 2018 17:20)    INTERVAL HPI/OVERNIGHT EVENTS:  Patient is a 58 y/o Female seen and examined at bedside. No acute distress. Patient underwent A/V fistula on 3/28: Brachial artery to Axilary vein on 03/28/2018 on left arm.  She has right arm painful bruising since 5 days ago now markedly improved ipsilateral to catheterization from Friday.  LBM: 03/30/2018. Had EGD yesterday, HD today.   This AM she denies complaints, says her arm pain is improved. with telephone pacific  at bedside ID#[id number].    ROS:  Patient denying CP, SOB, palpitations, leg swelling, lightheadedness/dizziness, paresthesias. All other ROS negative     Vital Signs Last 24 Hrs  T(C): 36.3 (31 Mar 2018 07:28), Max: 36.5 (30 Mar 2018 17:41)  T(F): 97.3 (31 Mar 2018 07:28), Max: 97.7 (30 Mar 2018 17:41)  HR: 68 (31 Mar 2018 07:28) (68 - 76)  BP: 163/91 (31 Mar 2018 07:28) (155/81 - 163/91)  BP(mean): --  RR: 18 (31 Mar 2018 07:28) (18 - 18)  SpO2: 94% (31 Mar 2018 07:28) (94% - 95%)    PHYSICAL EXAM:    GENERAL: NAD, well-groomed, well-developed.  HEENT: EOMI, MMM, PERRLA  CHEST/LUNG:  CTA BL no wheezing, no crackles noted  HEART: S1S2nl, no r/g noted now. + 3/6 SM. No pericardial rub noted   ABDOMEN: Soft, Nontender, Nondistended; Bowel sounds present  EXTREMITIES:  2+ Peripheral Pulses, No clubbing, cyanosis, or edema in b/l LE. LUE with swelling with medial upper arm ecchymosis, fading. Pulses in both left and right arm palpable.   Skin: right sided PC in place (neck). Left arm with surgical wound covered, no active bleeding or discharge noted, no tender to touch.  NERVOUS SYSTEM:  Alert & Oriented X3, Good concentration; Motor Strength 5/5 B/L upper and lower extremities; DTRs 2+ intact and symmetric    CAPILLARY BLOOD GLUCOSE      POCT Blood Glucose.: 241 mg/dL (31 Mar 2018 08:04)  POCT Blood Glucose.: 257 mg/dL (30 Mar 2018 22:39)  POCT Blood Glucose.: 207 mg/dL (30 Mar 2018 13:04)      Labs:                                    9.3    9.3   )-----------( 475      ( 29 Mar 2018 08:34 )             29.2     03-29    127<L>  |  89<L>  |  31.0<H>  ----------------------------<  242<H>  4.9   |  25.0  |  4.15<H>    Ca    8.2<L>      29 Mar 2018 08:34  Phos  1.8     03-28  Mg     1.7     03-28    < from:  Duplex Arterial Upper Ext Ltd, Right (03.25.18 @ 15:42) >  IMPRESSION: No evidence of pseudoaneurysm    < end of copied text >      MEDICATIONS  (STANDING):  artificial  tears Solution 1 Drop(s) Both EYES every 12 hours  aspirin enteric coated 81 milliGRAM(s) Oral daily  carvedilol 12.5 milliGRAM(s) Oral every 12 hours  cloNIDine 0.2 milliGRAM(s) Oral two times a day  colchicine 0.6 milliGRAM(s) Oral two times a day  dextrose 50% Injectable 12.5 Gram(s) IV Push once  dextrose 50% Injectable 25 Gram(s) IV Push once  dextrose 50% Injectable 25 Gram(s) IV Push once  docusate sodium 100 milliGRAM(s) Oral daily  enoxaparin Injectable 60 milliGRAM(s) SubCutaneous every 24 hours  epoetin gian Injectable 83190 Unit(s) IV Push <User Schedule>  ergocalciferol 72640 Unit(s) Oral every week  hydrALAZINE 50 milliGRAM(s) Oral every 8 hours  insulin lispro (HumaLOG) corrective regimen sliding scale   SubCutaneous three times a day before meals  pantoprazole    Tablet 40 milliGRAM(s) Oral before breakfast  senna 2 Tablet(s) Oral at bedtime  simvastatin 40 milliGRAM(s) Oral at bedtime    MEDICATIONS  (PRN):  acetaminophen   Tablet 650 milliGRAM(s) Oral every 6 hours PRN For Temp greater than 38 C (100.4 F)  acetaminophen   Tablet. 650 milliGRAM(s) Oral every 6 hours PRN Moderate Pain (4 - 6)  dextrose Gel 1 Dose(s) Oral once PRN Blood Glucose LESS THAN 70 milliGRAM(s)/deciliter  glucagon  Injectable 1 milliGRAM(s) IntraMuscular once PRN Glucose LESS THAN 70 milligrams/deciliter  ondansetron Injectable 4 milliGRAM(s) IV Push every 6 hours PRN Nausea Patient is a 57y old  Female who presents with a chief complaint of Nausea/vomiting and Chest pain (16 Mar 2018 17:20)    INTERVAL HPI/OVERNIGHT EVENTS:  Patient is a 58 y/o Female seen and examined at bedside. No acute distress. Patient underwent A/V fistula on 3/28: Brachial artery to Axilary vein on 03/28/2018 on left arm.  She has right arm painful bruising since 5 days ago now markedly improved ipsilateral to catheterization from Friday.  LBM: 03/30/2018. Had EGD yesterday + HD yesterday.   This AM she denies complaints, says her arm pain is improved. with telephone pacific  at bedside ID#[id number].    ROS:  Patient denying CP, SOB, palpitations, leg swelling, lightheadedness/dizziness, paresthesias. All other ROS negative     Vital Signs Last 24 Hrs  T(C): 36.3 (31 Mar 2018 07:28), Max: 36.5 (30 Mar 2018 17:41)  T(F): 97.3 (31 Mar 2018 07:28), Max: 97.7 (30 Mar 2018 17:41)  HR: 68 (31 Mar 2018 07:28) (68 - 76)  BP: 163/91 (31 Mar 2018 07:28) (155/81 - 163/91)  BP(mean): --  RR: 18 (31 Mar 2018 07:28) (18 - 18)  SpO2: 94% (31 Mar 2018 07:28) (94% - 95%)    PHYSICAL EXAM:    GENERAL: NAD, well-groomed, well-developed.  HEENT: EOMI, MMM, PERRLA  CHEST/LUNG:  CTA BL no wheezing, no crackles noted  HEART: S1S2nl, no r/g noted now. + 3/6 SM. No pericardial rub noted   ABDOMEN: Soft, Nontender, Nondistended; Bowel sounds present  EXTREMITIES:  2+ Peripheral Pulses, No clubbing, cyanosis,. +BL LE, pitting, +1 in RLE > than LLE. LUE with swelling with medial upper arm ecchymosis, fading. Pulses in both left and right arm palpable.   Skin: right sided PC in place (neck). Left arm with surgical wound covered, no active bleeding or discharge noted, no tender to touch.  NERVOUS SYSTEM:  Alert & Oriented X3, Good concentration; Motor Strength 5/5 B/L upper and lower extremities;     CAPILLARY BLOOD GLUCOSE      POCT Blood Glucose.: 241 mg/dL (31 Mar 2018 08:04)  POCT Blood Glucose.: 257 mg/dL (30 Mar 2018 22:39)  POCT Blood Glucose.: 207 mg/dL (30 Mar 2018 13:04)      Labs:                                    9.3    9.3   )-----------( 475      ( 29 Mar 2018 08:34 )             29.2     03-29    127<L>  |  89<L>  |  31.0<H>  ----------------------------<  242<H>  4.9   |  25.0  |  4.15<H>    Ca    8.2<L>      29 Mar 2018 08:34  Phos  1.8     03-28  Mg     1.7     03-28    < from: US Duplex Arterial Upper Ext Ltd, Right (03.25.18 @ 15:42) >  IMPRESSION: No evidence of pseudoaneurysm    < end of copied text >      MEDICATIONS  (STANDING):  artificial  tears Solution 1 Drop(s) Both EYES every 12 hours  aspirin enteric coated 81 milliGRAM(s) Oral daily  carvedilol 12.5 milliGRAM(s) Oral every 12 hours  cloNIDine 0.2 milliGRAM(s) Oral two times a day  colchicine 0.6 milliGRAM(s) Oral two times a day  dextrose 50% Injectable 12.5 Gram(s) IV Push once  dextrose 50% Injectable 25 Gram(s) IV Push once  dextrose 50% Injectable 25 Gram(s) IV Push once  docusate sodium 100 milliGRAM(s) Oral daily  enoxaparin Injectable 60 milliGRAM(s) SubCutaneous every 24 hours  epoetin gian Injectable 27511 Unit(s) IV Push <User Schedule>  ergocalciferol 40262 Unit(s) Oral every week  hydrALAZINE 50 milliGRAM(s) Oral every 8 hours  insulin lispro (HumaLOG) corrective regimen sliding scale   SubCutaneous three times a day before meals  pantoprazole    Tablet 40 milliGRAM(s) Oral before breakfast  senna 2 Tablet(s) Oral at bedtime  simvastatin 40 milliGRAM(s) Oral at bedtime    MEDICATIONS  (PRN):  acetaminophen   Tablet 650 milliGRAM(s) Oral every 6 hours PRN For Temp greater than 38 C (100.4 F)  acetaminophen   Tablet. 650 milliGRAM(s) Oral every 6 hours PRN Moderate Pain (4 - 6)  dextrose Gel 1 Dose(s) Oral once PRN Blood Glucose LESS THAN 70 milliGRAM(s)/deciliter  glucagon  Injectable 1 milliGRAM(s) IntraMuscular once PRN Glucose LESS THAN 70 milligrams/deciliter  ondansetron Injectable 4 milliGRAM(s) IV Push every 6 hours PRN Nausea

## 2018-03-31 NOTE — PROGRESS NOTE ADULT - SUBJECTIVE AND OBJECTIVE BOX
Pt seen and examined f/u nausea, vomiting and hematemesis  This morning she is feeling well with no complaints. Tolerating diet. The EGD showed a nonspecific gastritis. Biopsies obtained for H Pylori    REVIEW OF SYSTEMS:    CONSTITUTIONAL: No fever, weight loss, or fatigue  EYES: No eye pain, visual disturbances, or discharge  ENMT:  No difficulty hearing, tinnitus, vertigo; No sinus or throat pain  RESPIRATORY: No cough, wheezing, chills or hemoptysis; No shortness of breath  CARDIOVASCULAR: No chest pain, palpitations, dizziness, or leg swelling  GASTROINTESTINAL: No abdominal or epigastric pain. No nausea, vomiting, or hematemesis; No diarrhea or constipation. No melena or hematochezia.    MEDICATIONS:  MEDICATIONS  (STANDING):  artificial  tears Solution 1 Drop(s) Both EYES every 12 hours  aspirin enteric coated 81 milliGRAM(s) Oral daily  carvedilol 12.5 milliGRAM(s) Oral every 12 hours  cloNIDine 0.2 milliGRAM(s) Oral two times a day  colchicine 0.6 milliGRAM(s) Oral two times a day  dextrose 50% Injectable 12.5 Gram(s) IV Push once  dextrose 50% Injectable 25 Gram(s) IV Push once  dextrose 50% Injectable 25 Gram(s) IV Push once  docusate sodium 100 milliGRAM(s) Oral daily  enoxaparin Injectable 60 milliGRAM(s) SubCutaneous every 24 hours  epoetin gian Injectable 60774 Unit(s) IV Push <User Schedule>  ergocalciferol 55936 Unit(s) Oral every week  hydrALAZINE 50 milliGRAM(s) Oral every 8 hours  insulin lispro (HumaLOG) corrective regimen sliding scale   SubCutaneous three times a day before meals  pantoprazole    Tablet 40 milliGRAM(s) Oral before breakfast  senna 2 Tablet(s) Oral at bedtime  simvastatin 40 milliGRAM(s) Oral at bedtime    MEDICATIONS  (PRN):  acetaminophen   Tablet 650 milliGRAM(s) Oral every 6 hours PRN For Temp greater than 38 C (100.4 F)  acetaminophen   Tablet. 650 milliGRAM(s) Oral every 6 hours PRN Moderate Pain (4 - 6)  dextrose Gel 1 Dose(s) Oral once PRN Blood Glucose LESS THAN 70 milliGRAM(s)/deciliter  glucagon  Injectable 1 milliGRAM(s) IntraMuscular once PRN Glucose LESS THAN 70 milligrams/deciliter  ondansetron Injectable 4 milliGRAM(s) IV Push every 6 hours PRN Nausea      Allergies    No Known Allergies    Intolerances        Vital Signs Last 24 Hrs  T(C): 36.3 (31 Mar 2018 07:28), Max: 36.5 (30 Mar 2018 17:41)  T(F): 97.3 (31 Mar 2018 07:28), Max: 97.7 (30 Mar 2018 17:41)  HR: 68 (31 Mar 2018 07:28) (68 - 76)  BP: 163/91 (31 Mar 2018 07:28) (155/81 - 163/91)  BP(mean): --  RR: 18 (31 Mar 2018 07:28) (18 - 18)  SpO2: 94% (31 Mar 2018 07:28) (94% - 95%)      PHYSICAL EXAM:    General: Well developed; well nourished; in no acute distress  HEENT: MMM, conjunctiva and sclera clear, mild periorbital edema without change  Gastrointestinal:Abdomen: Soft non-tender non-distended; Normal bowel sounds; No hepatosplenomegaly  Extremities: no cyanosis, clubbing or edema.  Skin: Warm and dry. No obvious rash    LABS:              PT/INR - ( 31 Mar 2018 07:50 )   PT: 11.9 sec;   INR: 1.08 ratio         PTT - ( 31 Mar 2018 07:50 )  PTT:36.2 sec                  RADIOLOGY & ADDITIONAL STUDIES (The following images were personally reviewed):

## 2018-04-01 LAB
ANION GAP SERPL CALC-SCNC: 12 MMOL/L — SIGNIFICANT CHANGE UP (ref 5–17)
BUN SERPL-MCNC: 16 MG/DL — SIGNIFICANT CHANGE UP (ref 8–20)
CALCIUM SERPL-MCNC: 7.9 MG/DL — LOW (ref 8.6–10.2)
CHLORIDE SERPL-SCNC: 92 MMOL/L — LOW (ref 98–107)
CO2 SERPL-SCNC: 27 MMOL/L — SIGNIFICANT CHANGE UP (ref 22–29)
CREAT SERPL-MCNC: 2.95 MG/DL — HIGH (ref 0.5–1.3)
GLUCOSE BLDC GLUCOMTR-MCNC: 236 MG/DL — HIGH (ref 70–99)
GLUCOSE BLDC GLUCOMTR-MCNC: 243 MG/DL — HIGH (ref 70–99)
GLUCOSE BLDC GLUCOMTR-MCNC: 246 MG/DL — HIGH (ref 70–99)
GLUCOSE SERPL-MCNC: 219 MG/DL — HIGH (ref 70–115)
HCT VFR BLD CALC: 27.9 % — LOW (ref 37–47)
HGB BLD-MCNC: 8.8 G/DL — LOW (ref 12–16)
INR BLD: 2.12 RATIO — HIGH (ref 0.88–1.16)
MAGNESIUM SERPL-MCNC: 1.7 MG/DL — SIGNIFICANT CHANGE UP (ref 1.6–2.6)
PHOSPHATE SERPL-MCNC: 2.4 MG/DL — SIGNIFICANT CHANGE UP (ref 2.4–4.7)
POTASSIUM SERPL-MCNC: 3.4 MMOL/L — LOW (ref 3.5–5.3)
POTASSIUM SERPL-SCNC: 3.4 MMOL/L — LOW (ref 3.5–5.3)
PROTHROM AB SERPL-ACNC: 23.7 SEC — HIGH (ref 9.8–12.7)
SODIUM SERPL-SCNC: 131 MMOL/L — LOW (ref 135–145)

## 2018-04-01 PROCEDURE — 99233 SBSQ HOSP IP/OBS HIGH 50: CPT | Mod: GC

## 2018-04-01 RX ORDER — HYDRALAZINE HCL 50 MG
10 TABLET ORAL EVERY 8 HOURS
Qty: 0 | Refills: 0 | Status: DISCONTINUED | OUTPATIENT
Start: 2018-04-01 | End: 2018-04-03

## 2018-04-01 RX ORDER — WARFARIN SODIUM 2.5 MG/1
5 TABLET ORAL ONCE
Qty: 0 | Refills: 0 | Status: COMPLETED | OUTPATIENT
Start: 2018-04-01 | End: 2018-04-01

## 2018-04-01 RX ADMIN — CARVEDILOL PHOSPHATE 12.5 MILLIGRAM(S): 80 CAPSULE, EXTENDED RELEASE ORAL at 05:52

## 2018-04-01 RX ADMIN — Medication 50 MILLIGRAM(S): at 05:50

## 2018-04-01 RX ADMIN — Medication 50 MILLIGRAM(S): at 21:32

## 2018-04-01 RX ADMIN — SENNA PLUS 2 TABLET(S): 8.6 TABLET ORAL at 21:32

## 2018-04-01 RX ADMIN — SIMVASTATIN 40 MILLIGRAM(S): 20 TABLET, FILM COATED ORAL at 21:32

## 2018-04-01 RX ADMIN — Medication 0.6 MILLIGRAM(S): at 18:43

## 2018-04-01 RX ADMIN — Medication 0.3 MILLIGRAM(S): at 18:43

## 2018-04-01 RX ADMIN — Medication 2: at 12:27

## 2018-04-01 RX ADMIN — PANTOPRAZOLE SODIUM 40 MILLIGRAM(S): 20 TABLET, DELAYED RELEASE ORAL at 05:50

## 2018-04-01 RX ADMIN — Medication 50 MILLIGRAM(S): at 14:20

## 2018-04-01 RX ADMIN — Medication 100 MILLIGRAM(S): at 14:10

## 2018-04-01 RX ADMIN — Medication 0.6 MILLIGRAM(S): at 05:50

## 2018-04-01 RX ADMIN — Medication 1 DROP(S): at 05:52

## 2018-04-01 RX ADMIN — CARVEDILOL PHOSPHATE 12.5 MILLIGRAM(S): 80 CAPSULE, EXTENDED RELEASE ORAL at 18:43

## 2018-04-01 RX ADMIN — Medication 81 MILLIGRAM(S): at 14:10

## 2018-04-01 RX ADMIN — Medication 0.2 MILLIGRAM(S): at 05:52

## 2018-04-01 RX ADMIN — Medication 10 MILLIGRAM(S): at 18:43

## 2018-04-01 RX ADMIN — ENOXAPARIN SODIUM 60 MILLIGRAM(S): 100 INJECTION SUBCUTANEOUS at 05:50

## 2018-04-01 RX ADMIN — WARFARIN SODIUM 5 MILLIGRAM(S): 2.5 TABLET ORAL at 21:32

## 2018-04-01 NOTE — PROGRESS NOTE ADULT - ATTENDING COMMENTS
Patient seen and examined at the bedside. Agree with the above history, physical, assessment, and plan with the necessary amendments/elaborations below:    Pt clinically stable. transitioning to coumadin, inr therapeutic, cont daily dosing at this time. awaiting hd seat in the community prior to dc.

## 2018-04-01 NOTE — PROGRESS NOTE ADULT - ASSESSMENT
57 y.o. F with hx of HTN, HLD, DMII, hyperkalemia, ckd 3 who presented with 3 weeks of postprandial vomiting and abd pain, noted to have ARF, fluid overload with bilateral pleural effusions and pericardial effusion as well as symptomatic anemia likely multifactorial secondary to acute on chronic renal failure and suspected upper GI Bleeding from vomiting/retching and NSAID use. Patient received 2 units of blood on admission with a good response. A trial of lasix was given and hospital course complicated by finding of uremic pericarditis which led to emergent HD and patient now being on permanent HD.  In the interim while being dialyzed the patient had an episode of PAF, s/p cardizem became rate controlled and remained in NSR. EP consulted. Unable to AC the patient due to initial concern for GI bleeding, for which GI consulted and tentatively GI intervention on 03/29/2018, cleared by cardio on 03/25/218. 4 days ago presented right arm ecchymosis on the cath site (cath was negative). Us of right arm reported superficial basilic vein distal thrombus but no DVT, pseudoaneurysm or hematoma. Now markedly improved. She is s/p A/V fistula on left arm: Brachial artery to Axillary vein (03/28/2018), HD 3/31/2018 (TTS schedule) and EGD done report pending and no GERALD as per cardiology.      In hospital procedures:   S/p R thoracocentesis on 3/20 with 1100 cc removed  S/P pleurocentesis done 3/21 on left sided with removal of 1 L. Fluid analysis consistent with transudate   s/p cath on 03/24/2018, NEG, EF 55%  s/p A/V fistula on left arm (03/28/2018)    Acute on Chronic Renal Failure  -CKD progressed to ESRD requiring HD, noted on arrival  -complicated by uremic pericarditis  -stable  -Permacath on right side, neck. Placed  03/22/2018  -s/p AVF/AVG on left arm: Brachial artery to Axillary vein on 03/28/2018 by vascular  -first ep HD complicated by new onset AFIB, since then has tolerated sessions well  -c/w standard HD sessions   -Acute hepatitis panel negative; ppd at 72 hours 0mm negative  -pending establishment of HD seat in community prior to DC    Anemia of chronic kidney disease:   -present on admission, stable  -due to worsening acute on chronic renal insufficiency in the setting of CKD with concomitant  iron deficiency   -s/p 4 pRBC total: with the last one being on 3/27 with a good Hgb response   -occult stool neg  -c/w PPI po bid   -sp EGD 3/30, result pending  -continue EPOGEN with HD and ferrous sulfate    New Onset A Fib  -No further episodes of AFib since initial episode during HD, terminated by cardizem  -paroxysmal nature, currently in NSR  -pending report from EGD, if no bleeding will start oral AC for stroke ppx  -goal HR <110bpm, controlled well on b blocker  -s/p negative cath; no further plans for GERALD prior to d/c per cardio  -outpt follow up with cardio    Uremic Pericarditis  -present on admission, improved  -sp emergent HD to optimize fluid balance as part of tx  -cardio eval appreciated  -c/w tx with colchicine bid for a total of 4 weeks which will end on 4/5    RUE Basilic Vein Distal Thrombosis  -noted on US done during this admission  -complication of admission  -improved  -given superficial nature, no plans for AC specifically for this issue  -supportive care, warm compresses PRN, LUE elevation    HTN  -goal BP <140/90, uncontrolled but asymptomatic   -c/w clonidine, increased  per renal on 03/27/2018 to 0.2mg BID  -increased hydralazine to 50mg tid doing well  -continue coreg  -HD for continued volume control  -BP still unimproved, so inc of clonidine to 0.3mg BID today and monitor BPs    DM2 on long term insulin therapy complicated by ESRD on HD  -HBA1C: 7.6, goal <7, uncontrolled  -continue sliding scale  -c/w Accu-Cheks ac hs tid   -c/w hypoglycemia protocol  -start lantus 5u, will attempt to reach goal AM fasting BS <200    DVT PPX  SCDs while in bed  Ambulate with assistance 57 y.o. F with hx of HTN, HLD, DMII, hyperkalemia, ckd 3 who presented with 3 weeks of postprandial vomiting and abd pain, noted to have ARF, fluid overload with bilateral pleural effusions and pericardial effusion as well as symptomatic anemia likely multifactorial secondary to acute on chronic renal failure and suspected upper GI Bleeding from vomiting/retching and NSAID use. Patient received 2 units of blood on admission with a good response. A trial of lasix was given and hospital course complicated by finding of uremic pericarditis which led to emergent HD and patient now being on permanent HD.  In the interim while being dialyzed the patient had an episode of PAF, s/p cardizem became rate controlled and remained in NSR. EP consulted. Unable to AC the patient due to initial concern for GI bleeding, for which GI consulted and tentatively GI intervention on 03/29/2018, cleared by cardio on 03/25/218. 4 days ago presented right arm ecchymosis on the cath site (cath was negative). Us of right arm reported superficial basilic vein distal thrombus but no DVT, pseudoaneurysm or hematoma. Now markedly improved. She is s/p A/V fistula on left arm: Brachial artery to Axillary vein (03/28/2018), HD 3/31/2018 (TTS schedule) and EGD done report pending and no GERALD as per cardiology.      In hospital procedures:   S/p R thoracocentesis on 3/20 with 1100 cc removed  S/P pleurocentesis done 3/21 on left sided with removal of 1 L. Fluid analysis consistent with transudate   s/p cath on 03/24/2018, NEG, EF 55%  s/p A/V fistula on left arm (03/28/2018)    Acute on Chronic Renal Failure  -CKD progressed to ESRD requiring HD, noted on arrival  -complicated by uremic pericarditis  -stable  -Permacath on right side, neck. Placed  03/22/2018  -s/p AVF/AVG on left arm: Brachial artery to Axillary vein on 03/28/2018 by vascular  -first ep HD complicated by new onset AFIB, since then has tolerated sessions well  -c/w standard HD sessions   -Acute hepatitis panel negative; ppd at 72 hours 0mm negative  -pending establishment of HD seat in community prior to DC    Anemia of chronic kidney disease:   -present on admission, stable  -due to worsening acute on chronic renal insufficiency in the setting of CKD with concomitant  iron deficiency   -s/p 4 pRBC total: with the last one being on 3/27 with a good Hgb response   -occult stool neg  -c/w PPI po bid   -sp EGD 3/30: altered vascular pattern in gastric fundus, body and antrum with scattered subepithelial petechiae and ecchymosis, otherwise normal exam  -continue EPOGEN with HD and ferrous sulfate    New Onset A Fib  -No further episodes of AFib since initial episode during HD, terminated by cardizem  -paroxysmal nature, currently in NSR  - no bleeding on EGD oral AC for stroke ppx started  -goal HR <110bpm, controlled well on b blocker  -s/p negative cath; no further plans for GERALD prior to d/c per cardio  -outpt follow up with cardio    Uremic Pericarditis  -present on admission, improved  -sp emergent HD to optimize fluid balance as part of tx  -cardio eval appreciated  -c/w tx with colchicine bid for a total of 4 weeks which will end on 4/5    RUE Basilic Vein Distal Thrombosis  -noted on US done during this admission  -complication of admission  -improved  -given superficial nature, no plans for AC specifically for this issue  -supportive care, warm compresses PRN, LUE elevation    HTN  -goal BP <140/90, uncontrolled but asymptomatic   -clonidine increased to 0.3mg BID  -c/w hydralazine to 50mg tid  -continue coreg 12.5 mg BID  -HD for continued volume control  - monitor BP and adjust medications as indicated    DM2 on long term insulin therapy complicated by ESRD on HD  -HBA1C: 7.6, goal <7, uncontrolled  -continue sliding scale  -c/w Accu-Cheks ac hs tid   -c/w hypoglycemia protocol  -start lantus 5u, will attempt to reach goal AM fasting BS <200    DVT PPX  SCDs while in bed  Ambulate with assistance

## 2018-04-01 NOTE — PROGRESS NOTE ADULT - ASSESSMENT
CKD V now ESRD on HD   Fluid overload (pleural and pericardial effusions)==> resolved  - HD yest on a TTS schedule  - await maturation of AVF    Anemia: r/o GI blood loss  - trend h/h  - MARGI w HD  - GI -EGD yest    RO: hypophosphatemia  -  binders on hold  - cont Rocaltrol  - trend phosphorous

## 2018-04-01 NOTE — PROGRESS NOTE ADULT - SUBJECTIVE AND OBJECTIVE BOX
NEPHROLOGY INTERVAL HPI/OVERNIGHT EVENTS:    Examined earlier  HD yest uneventful    MEDICATIONS  (STANDING):  artificial  tears Solution 1 Drop(s) Both EYES every 12 hours  aspirin enteric coated 81 milliGRAM(s) Oral daily  carvedilol 12.5 milliGRAM(s) Oral every 12 hours  cloNIDine 0.3 milliGRAM(s) Oral two times a day  colchicine 0.6 milliGRAM(s) Oral two times a day  dextrose 50% Injectable 12.5 Gram(s) IV Push once  dextrose 50% Injectable 25 Gram(s) IV Push once  dextrose 50% Injectable 25 Gram(s) IV Push once  docusate sodium 100 milliGRAM(s) Oral daily  enoxaparin Injectable 60 milliGRAM(s) SubCutaneous every 24 hours  epoetin gian Injectable 62008 Unit(s) IV Push <User Schedule>  ergocalciferol 67553 Unit(s) Oral every week  hydrALAZINE 50 milliGRAM(s) Oral every 8 hours  insulin lispro (HumaLOG) corrective regimen sliding scale   SubCutaneous three times a day before meals  pantoprazole    Tablet 40 milliGRAM(s) Oral before breakfast  senna 2 Tablet(s) Oral at bedtime  simvastatin 40 milliGRAM(s) Oral at bedtime    MEDICATIONS  (PRN):  acetaminophen   Tablet 650 milliGRAM(s) Oral every 6 hours PRN For Temp greater than 38 C (100.4 F)  acetaminophen   Tablet. 650 milliGRAM(s) Oral every 6 hours PRN Moderate Pain (4 - 6)  dextrose Gel 1 Dose(s) Oral once PRN Blood Glucose LESS THAN 70 milliGRAM(s)/deciliter  glucagon  Injectable 1 milliGRAM(s) IntraMuscular once PRN Glucose LESS THAN 70 milligrams/deciliter  hydrALAZINE Injectable 10 milliGRAM(s) IV Push every 8 hours PRN SBP>160  ondansetron Injectable 4 milliGRAM(s) IV Push every 6 hours PRN Nausea      Allergies    No Known Allergies    Intolerances        Vital Signs Last 24 Hrs  T(C): 36.2 (2018 08:28), Max: 36.9 (31 Mar 2018 23:47)  T(F): 97.2 (2018 08:28), Max: 98.4 (31 Mar 2018 23:47)  HR: 68 (2018 08:28) (68 - 78)  BP: 163/82 (2018 08:28) (163/82 - 181/86)  BP(mean): --  RR: 18 (2018 08:28) (18 - 18)  SpO2: 96% (2018 08:28) (92% - 98%)  Daily     Daily Weight in k.3 (31 Mar 2018 18:45)    PHYSICAL EXAM:  GENERAL: NAD  NECK: Supple, No JVD  NERVOUS SYSTEM:  Alert & Oriented X3  CHEST/LUNG: Clear bilaterally   HEART: Regular rate and rhythm; No rub  ABDOMEN: Soft, +BS  EXTREMITIES:  2+ Peripheral Pulses, tr edema; L AVF +thrill +bruit  SKIN: No rashes or lesions      LABS:                        8.8    x     )-----------( x        ( 2018 06:01 )             27.9     04-01    131<L>  |  92<L>  |  16.0  ----------------------------<  219<H>  3.4<L>   |  27.0  |  2.95<H>    Ca    7.9<L>      2018 06:01  Phos  2.4     -  Mg     1.7     -      PT/INR - ( 2018 06:01 )   PT: 23.7 sec;   INR: 2.12 ratio         PTT - ( 31 Mar 2018 07:50 )  PTT:36.2 sec    Magnesium, Serum: 1.7 mg/dL ( @ 06:01)  Phosphorus Level, Serum: 2.4 mg/dL ( @ 06:01)          RADIOLOGY & ADDITIONAL TESTS:

## 2018-04-01 NOTE — PROGRESS NOTE ADULT - SUBJECTIVE AND OBJECTIVE BOX
Resident Progress Note  Patient is a 57y old  Female who presents with a chief complaint of Nausea/vomiting and Chest pain (16 Mar 2018 17:20)    INTERVAL/OVERNIGHT EVENTS: Patient seen and examined bedside this morning. She denies any active complaints. She had BM last night.     ROS: Denies chest pain, palpitations, sob, cough, fevers/chills, abdominal pain, n/v, diarrhea/constipation, dysuria or increased urinary frequency.    Vital Signs Last 24 Hrs  T(C): 36.9 (31 Mar 2018 23:47), Max: 36.9 (31 Mar 2018 23:47)  T(F): 98.4 (31 Mar 2018 23:47), Max: 98.4 (31 Mar 2018 23:47)  HR: 78 (31 Mar 2018 23:47) (68 - 78)  BP: 173/84 (31 Mar 2018 23:47) (164/79 - 181/86)  RR: 18 (31 Mar 2018 23:47) (18 - 18)  SpO2: 92% (31 Mar 2018 23:47) (92% - 98%)    PHYSICAL EXAM:  General: Adult female in NAD  HEENT: NC AT EOMI, moist mucous membranes.  Neck: Supple,  No JVD. R IJ HD access noted.  Respiratory: Clear to auscultation bilaterally, no wheezing, rales, or rhonchi.  Cardiovascular: RRR, no murmurs, rubs, or gallops.  Gastrointestinal: BS+, soft, non-tender, no masses palpable  Extremities: No peripheral edema. L UE AV fistula  Vascular: 2+ peripheral pulses.  Neurological: A/O x 3, no focal deficits.  Psychiatric: Normal mood, normal affect.  Musculoskeletal: moving all extremities  Skin: No rashes.    HEALTH ISSUES - PROBLEM Dx:  VTE (venous thromboembolism): VTE (venous thromboembolism)  Hematemesis with nausea: Hematemesis with nausea  Loose stools: Loose stools  Bilious vomiting with nausea: Bilious vomiting with nausea  Arm swelling: Arm swelling  Uremic pericarditis: Uremic pericarditis  Nausea and vomiting in adult: Nausea and vomiting in adult  Pericardial effusion: Pericardial effusion  Pleural effusion: Pleural effusion  Ventricular tachycardia: Ventricular tachycardia  Hypertension, unspecified type: Hypertension, unspecified type  Other pericarditis, unspecified chronicity: Other pericarditis, unspecified chronicity  Diastolic congestive heart failure, unspecified congestive heart failure chronicity: Diastolic congestive heart failure, unspecified congestive heart failure chronicity  Ventricular tachyarrhythmia: Ventricular tachyarrhythmia  Anemia, unspecified type: Anemia, unspecified type  New onset atrial fibrillation: New onset atrial fibrillation  Epigastric pain: Epigastric pain  Anemia: Anemia  Diabetes mellitus: Diabetes mellitus  Hypertensive urgency: Hypertensive urgency  Fluid overload: Fluid overload  Intractable vomiting with nausea, unspecified vomiting type: Intractable vomiting with nausea, unspecified vomiting type  Essential hypertension: Essential hypertension  Chest pain on breathing: Chest pain on breathing  Lung infiltrate: Lung infiltrate  Need for prophylactic measure: Need for prophylactic measure  History of hyperkalemia: History of hyperkalemia  Hypertension: Hypertension  Hyperglycemia due to type 2 diabetes mellitus: Hyperglycemia due to type 2 diabetes mellitus  Elevated troponin level: Elevated troponin level  Acute on chronic renal insufficiency: Acute on chronic renal insufficiency  Intractable nausea and vomiting: Intractable nausea and vomiting  Chest pain: Chest pain  Symptomatic anemia: Symptomatic anemia    MEDICATIONS  (STANDING):  artificial  tears Solution 1 Drop(s) Both EYES every 12 hours  aspirin enteric coated 81 milliGRAM(s) Oral daily  carvedilol 12.5 milliGRAM(s) Oral every 12 hours  cloNIDine 0.2 milliGRAM(s) Oral two times a day  colchicine 0.6 milliGRAM(s) Oral two times a day  dextrose 50% Injectable 12.5 Gram(s) IV Push once  dextrose 50% Injectable 25 Gram(s) IV Push once  dextrose 50% Injectable 25 Gram(s) IV Push once  docusate sodium 100 milliGRAM(s) Oral daily  enoxaparin Injectable 60 milliGRAM(s) SubCutaneous every 24 hours  epoetin gian Injectable 45356 Unit(s) IV Push <User Schedule>  ergocalciferol 67769 Unit(s) Oral every week  hydrALAZINE 50 milliGRAM(s) Oral every 8 hours  insulin lispro (HumaLOG) corrective regimen sliding scale   SubCutaneous three times a day before meals  pantoprazole    Tablet 40 milliGRAM(s) Oral before breakfast  senna 2 Tablet(s) Oral at bedtime  simvastatin 40 milliGRAM(s) Oral at bedtime    MEDICATIONS  (PRN):  acetaminophen   Tablet 650 milliGRAM(s) Oral every 6 hours PRN For Temp greater than 38 C (100.4 F)  acetaminophen   Tablet. 650 milliGRAM(s) Oral every 6 hours PRN Moderate Pain (4 - 6)  dextrose Gel 1 Dose(s) Oral once PRN Blood Glucose LESS THAN 70 milliGRAM(s)/deciliter  glucagon  Injectable 1 milliGRAM(s) IntraMuscular once PRN Glucose LESS THAN 70 milligrams/deciliter  ondansetron Injectable 4 milliGRAM(s) IV Push every 6 hours PRN Nausea  MEDICATIONS  (STANDING):  artificial  tears Solution 1 Drop(s) Both EYES every 12 hours  aspirin enteric coated 81 milliGRAM(s) Oral daily  carvedilol 12.5 milliGRAM(s) Oral every 12 hours  cloNIDine 0.2 milliGRAM(s) Oral two times a day  colchicine 0.6 milliGRAM(s) Oral two times a day  dextrose 50% Injectable 12.5 Gram(s) IV Push once  dextrose 50% Injectable 25 Gram(s) IV Push once  dextrose 50% Injectable 25 Gram(s) IV Push once  docusate sodium 100 milliGRAM(s) Oral daily  enoxaparin Injectable 60 milliGRAM(s) SubCutaneous every 24 hours  epoetin gian Injectable 98378 Unit(s) IV Push <User Schedule>  ergocalciferol 94314 Unit(s) Oral every week  hydrALAZINE 50 milliGRAM(s) Oral every 8 hours  insulin lispro (HumaLOG) corrective regimen sliding scale   SubCutaneous three times a day before meals  pantoprazole    Tablet 40 milliGRAM(s) Oral before breakfast  senna 2 Tablet(s) Oral at bedtime  simvastatin 40 milliGRAM(s) Oral at bedtime    MEDICATIONS  (PRN):  acetaminophen   Tablet 650 milliGRAM(s) Oral every 6 hours PRN For Temp greater than 38 C (100.4 F)  acetaminophen   Tablet. 650 milliGRAM(s) Oral every 6 hours PRN Moderate Pain (4 - 6)  dextrose Gel 1 Dose(s) Oral once PRN Blood Glucose LESS THAN 70 milliGRAM(s)/deciliter  glucagon  Injectable 1 milliGRAM(s) IntraMuscular once PRN Glucose LESS THAN 70 milligrams/deciliter  hydrALAZINE Injectable 10 milliGRAM(s) IV Push every 8 hours PRN SBP>160  ondansetron Injectable 4 milliGRAM(s) IV Push every 6 hours PRN Nausea      LABS:                        8.8    x     )-----------( x        ( 01 Apr 2018 06:01 )             27.9     04-01    131<L>  |  92<L>  |  16.0  ----------------------------<  219<H>  3.4<L>   |  27.0  |  2.95<H>    Ca    7.9<L>      01 Apr 2018 06:01  Phos  2.4     04-01  Mg     1.7     04-01      PT/INR - ( 01 Apr 2018 06:01 )   PT: 23.7 sec;   INR: 2.12 ratio      PTT - ( 31 Mar 2018 07:50 )  PTT:36.2 sec Resident Progress Note  Patient is a 57y old  Female who presents with a chief complaint of Nausea/vomiting and Chest pain (16 Mar 2018 17:20)    INTERVAL/OVERNIGHT EVENTS: Patient seen and examined bedside this morning. She denies any active complaints. She had BM last night.     ROS: Denies chest pain, palpitations, sob, cough, fevers/chills, abdominal pain, n/v, diarrhea/constipation, dysuria or increased urinary frequency.    Vital Signs Last 24 Hrs  T(C): 36.9 (31 Mar 2018 23:47), Max: 36.9 (31 Mar 2018 23:47)  T(F): 98.4 (31 Mar 2018 23:47), Max: 98.4 (31 Mar 2018 23:47)  HR: 78 (31 Mar 2018 23:47) (68 - 78)  BP: 173/84 (31 Mar 2018 23:47) (164/79 - 181/86)  RR: 18 (31 Mar 2018 23:47) (18 - 18)  SpO2: 92% (31 Mar 2018 23:47) (92% - 98%)    PHYSICAL EXAM:  General: Adult female in NAD  HEENT: NC AT EOMI, moist mucous membranes.  Neck: Supple,  No JVD. R IJ HD access noted, site clean  Respiratory: Clear to auscultation bilaterally, no wheezing, rales, or rhonchi.  Cardiovascular: RRR, no murmurs, rubs, or gallops.  Gastrointestinal: BS+, soft, non-tender, no masses palpable  Extremities: No peripheral edema. L UE AV fistula, + thrill  Vascular: 2+ peripheral pulses.  Neurological: A/O x 3, no focal deficits.  Psychiatric: Normal mood, normal affect.  Musculoskeletal: moving all extremities  Skin: No rashes.    HEALTH ISSUES - PROBLEM Dx:  VTE (venous thromboembolism): VTE (venous thromboembolism)  Hematemesis with nausea: Hematemesis with nausea  Loose stools: Loose stools  Bilious vomiting with nausea: Bilious vomiting with nausea  Arm swelling: Arm swelling  Uremic pericarditis: Uremic pericarditis  Nausea and vomiting in adult: Nausea and vomiting in adult  Pericardial effusion: Pericardial effusion  Pleural effusion: Pleural effusion  Ventricular tachycardia: Ventricular tachycardia  Hypertension, unspecified type: Hypertension, unspecified type  Other pericarditis, unspecified chronicity: Other pericarditis, unspecified chronicity  Diastolic congestive heart failure, unspecified congestive heart failure chronicity: Diastolic congestive heart failure, unspecified congestive heart failure chronicity  Ventricular tachyarrhythmia: Ventricular tachyarrhythmia  Anemia, unspecified type: Anemia, unspecified type  New onset atrial fibrillation: New onset atrial fibrillation  Epigastric pain: Epigastric pain  Anemia: Anemia  Diabetes mellitus: Diabetes mellitus  Hypertensive urgency: Hypertensive urgency  Fluid overload: Fluid overload  Intractable vomiting with nausea, unspecified vomiting type: Intractable vomiting with nausea, unspecified vomiting type  Essential hypertension: Essential hypertension  Chest pain on breathing: Chest pain on breathing  Lung infiltrate: Lung infiltrate  Need for prophylactic measure: Need for prophylactic measure  History of hyperkalemia: History of hyperkalemia  Hypertension: Hypertension  Hyperglycemia due to type 2 diabetes mellitus: Hyperglycemia due to type 2 diabetes mellitus  Elevated troponin level: Elevated troponin level  Acute on chronic renal insufficiency: Acute on chronic renal insufficiency  Intractable nausea and vomiting: Intractable nausea and vomiting  Chest pain: Chest pain  Symptomatic anemia: Symptomatic anemia    MEDICATIONS  (STANDING):  artificial  tears Solution 1 Drop(s) Both EYES every 12 hours  aspirin enteric coated 81 milliGRAM(s) Oral daily  carvedilol 12.5 milliGRAM(s) Oral every 12 hours  cloNIDine 0.2 milliGRAM(s) Oral two times a day  colchicine 0.6 milliGRAM(s) Oral two times a day  dextrose 50% Injectable 12.5 Gram(s) IV Push once  dextrose 50% Injectable 25 Gram(s) IV Push once  dextrose 50% Injectable 25 Gram(s) IV Push once  docusate sodium 100 milliGRAM(s) Oral daily  enoxaparin Injectable 60 milliGRAM(s) SubCutaneous every 24 hours  epoetin gian Injectable 59737 Unit(s) IV Push <User Schedule>  ergocalciferol 49102 Unit(s) Oral every week  hydrALAZINE 50 milliGRAM(s) Oral every 8 hours  insulin lispro (HumaLOG) corrective regimen sliding scale   SubCutaneous three times a day before meals  pantoprazole    Tablet 40 milliGRAM(s) Oral before breakfast  senna 2 Tablet(s) Oral at bedtime  simvastatin 40 milliGRAM(s) Oral at bedtime    MEDICATIONS  (PRN):  acetaminophen   Tablet 650 milliGRAM(s) Oral every 6 hours PRN For Temp greater than 38 C (100.4 F)  acetaminophen   Tablet. 650 milliGRAM(s) Oral every 6 hours PRN Moderate Pain (4 - 6)  dextrose Gel 1 Dose(s) Oral once PRN Blood Glucose LESS THAN 70 milliGRAM(s)/deciliter  glucagon  Injectable 1 milliGRAM(s) IntraMuscular once PRN Glucose LESS THAN 70 milligrams/deciliter  ondansetron Injectable 4 milliGRAM(s) IV Push every 6 hours PRN Nausea  MEDICATIONS  (STANDING):  artificial  tears Solution 1 Drop(s) Both EYES every 12 hours  aspirin enteric coated 81 milliGRAM(s) Oral daily  carvedilol 12.5 milliGRAM(s) Oral every 12 hours  cloNIDine 0.2 milliGRAM(s) Oral two times a day  colchicine 0.6 milliGRAM(s) Oral two times a day  dextrose 50% Injectable 12.5 Gram(s) IV Push once  dextrose 50% Injectable 25 Gram(s) IV Push once  dextrose 50% Injectable 25 Gram(s) IV Push once  docusate sodium 100 milliGRAM(s) Oral daily  enoxaparin Injectable 60 milliGRAM(s) SubCutaneous every 24 hours  epoetin gian Injectable 87359 Unit(s) IV Push <User Schedule>  ergocalciferol 30265 Unit(s) Oral every week  hydrALAZINE 50 milliGRAM(s) Oral every 8 hours  insulin lispro (HumaLOG) corrective regimen sliding scale   SubCutaneous three times a day before meals  pantoprazole    Tablet 40 milliGRAM(s) Oral before breakfast  senna 2 Tablet(s) Oral at bedtime  simvastatin 40 milliGRAM(s) Oral at bedtime    MEDICATIONS  (PRN):  acetaminophen   Tablet 650 milliGRAM(s) Oral every 6 hours PRN For Temp greater than 38 C (100.4 F)  acetaminophen   Tablet. 650 milliGRAM(s) Oral every 6 hours PRN Moderate Pain (4 - 6)  dextrose Gel 1 Dose(s) Oral once PRN Blood Glucose LESS THAN 70 milliGRAM(s)/deciliter  glucagon  Injectable 1 milliGRAM(s) IntraMuscular once PRN Glucose LESS THAN 70 milligrams/deciliter  hydrALAZINE Injectable 10 milliGRAM(s) IV Push every 8 hours PRN SBP>160  ondansetron Injectable 4 milliGRAM(s) IV Push every 6 hours PRN Nausea      LABS:                        8.8    x     )-----------( x        ( 01 Apr 2018 06:01 )             27.9     04-01    131<L>  |  92<L>  |  16.0  ----------------------------<  219<H>  3.4<L>   |  27.0  |  2.95<H>    Ca    7.9<L>      01 Apr 2018 06:01  Phos  2.4     04-01  Mg     1.7     04-01      PT/INR - ( 01 Apr 2018 06:01 )   PT: 23.7 sec;   INR: 2.12 ratio      PTT - ( 31 Mar 2018 07:50 )  PTT:36.2 sec

## 2018-04-02 LAB
APTT BLD: 39.4 SEC — HIGH (ref 27.5–37.4)
GLUCOSE BLDC GLUCOMTR-MCNC: 223 MG/DL — HIGH (ref 70–99)
GLUCOSE BLDC GLUCOMTR-MCNC: 225 MG/DL — HIGH (ref 70–99)
GLUCOSE BLDC GLUCOMTR-MCNC: 282 MG/DL — HIGH (ref 70–99)
GLUCOSE BLDC GLUCOMTR-MCNC: 417 MG/DL — HIGH (ref 70–99)
HCT VFR BLD CALC: 26.9 % — LOW (ref 37–47)
HGB BLD-MCNC: 8.3 G/DL — LOW (ref 12–16)
INR BLD: 3.48 RATIO — HIGH (ref 0.88–1.16)
PROTHROM AB SERPL-ACNC: 39.3 SEC — HIGH (ref 9.8–12.7)

## 2018-04-02 PROCEDURE — 99232 SBSQ HOSP IP/OBS MODERATE 35: CPT

## 2018-04-02 PROCEDURE — 99233 SBSQ HOSP IP/OBS HIGH 50: CPT | Mod: GC

## 2018-04-02 RX ORDER — INSULIN LISPRO 100/ML
4 VIAL (ML) SUBCUTANEOUS ONCE
Qty: 0 | Refills: 0 | Status: COMPLETED | OUTPATIENT
Start: 2018-04-02 | End: 2018-04-02

## 2018-04-02 RX ADMIN — Medication 100 MILLIGRAM(S): at 12:01

## 2018-04-02 RX ADMIN — Medication 1 DROP(S): at 05:08

## 2018-04-02 RX ADMIN — Medication 3: at 11:57

## 2018-04-02 RX ADMIN — Medication 2: at 17:22

## 2018-04-02 RX ADMIN — Medication 50 MILLIGRAM(S): at 05:08

## 2018-04-02 RX ADMIN — PANTOPRAZOLE SODIUM 40 MILLIGRAM(S): 20 TABLET, DELAYED RELEASE ORAL at 05:08

## 2018-04-02 RX ADMIN — Medication 0.3 MILLIGRAM(S): at 05:08

## 2018-04-02 RX ADMIN — Medication 1 DROP(S): at 17:25

## 2018-04-02 RX ADMIN — Medication 0.6 MILLIGRAM(S): at 05:08

## 2018-04-02 RX ADMIN — CARVEDILOL PHOSPHATE 12.5 MILLIGRAM(S): 80 CAPSULE, EXTENDED RELEASE ORAL at 17:54

## 2018-04-02 RX ADMIN — Medication 81 MILLIGRAM(S): at 11:59

## 2018-04-02 RX ADMIN — Medication 4 UNIT(S): at 22:38

## 2018-04-02 RX ADMIN — CARVEDILOL PHOSPHATE 12.5 MILLIGRAM(S): 80 CAPSULE, EXTENDED RELEASE ORAL at 05:08

## 2018-04-02 RX ADMIN — Medication 0.3 MILLIGRAM(S): at 17:54

## 2018-04-02 RX ADMIN — Medication 0.6 MILLIGRAM(S): at 17:54

## 2018-04-02 RX ADMIN — Medication 50 MILLIGRAM(S): at 14:37

## 2018-04-02 RX ADMIN — Medication 2: at 08:16

## 2018-04-02 RX ADMIN — Medication 50 MILLIGRAM(S): at 21:51

## 2018-04-02 RX ADMIN — SENNA PLUS 2 TABLET(S): 8.6 TABLET ORAL at 21:51

## 2018-04-02 RX ADMIN — SIMVASTATIN 40 MILLIGRAM(S): 20 TABLET, FILM COATED ORAL at 21:51

## 2018-04-02 NOTE — PROGRESS NOTE ADULT - ASSESSMENT
CRF (V): +DM  Fluid overload (pleural and pericardial effusions)==> resolved with initiation of dialysis  - HD tomorrow  - monitor serum K+ (has been low)  - await maturation of AVF    Anemia: Hematemesis with GI blood loss  Fe stores ok  - MARGI at HD; adjust as needed  - trend H/H (target Hgb=10.0)  - consider PRBCs       RO: Phosphorus Level, Serum: 2.4 mg/dL (04.01.18 @ 06:01)  -  binders on hold  - cont Rocaltrol  - trend phosphorous

## 2018-04-02 NOTE — CHART NOTE - NSCHARTNOTEFT_GEN_A_CORE
Source: Patient [ ]  Family [ ]   other [x ] RN     Current Diet: consistent CHO/Renal/low fiber/DASH/1200ml fluid restriction with Nepro 1 can daily     Patient reports [ ] nausea  [ ] vomiting [ ] diarrhea [ ] constipation  [ ]chewing problems [ ] swallowing issues  [ ] other:     PO intake:  < 50% [ ]   50-75%  [ ]   %  [ x]  other :    Source for PO intake [ ] Patient [ ] family [x ] chart [ ] staff [ ] other    Enteral /Parenteral Nutrition: n/a    Current Weight:   3/31: 166#  3/24: 170#  3/13: 150# - likely inaccurate vs fluid shifts with dialysis, will continue to monitor.     % Weight Change     Pertinent Medications: MEDICATIONS  (STANDING):  artificial  tears Solution 1 Drop(s) Both EYES every 12 hours  aspirin enteric coated 81 milliGRAM(s) Oral daily  carvedilol 12.5 milliGRAM(s) Oral every 12 hours  cloNIDine 0.3 milliGRAM(s) Oral two times a day  colchicine 0.6 milliGRAM(s) Oral two times a day  dextrose 50% Injectable 12.5 Gram(s) IV Push once  dextrose 50% Injectable 25 Gram(s) IV Push once  dextrose 50% Injectable 25 Gram(s) IV Push once  docusate sodium 100 milliGRAM(s) Oral daily  epoetin gian Injectable 92370 Unit(s) IV Push <User Schedule>  ergocalciferol 24649 Unit(s) Oral every week  hydrALAZINE 50 milliGRAM(s) Oral every 8 hours  insulin lispro (HumaLOG) corrective regimen sliding scale   SubCutaneous three times a day before meals  pantoprazole    Tablet 40 milliGRAM(s) Oral before breakfast  senna 2 Tablet(s) Oral at bedtime  simvastatin 40 milliGRAM(s) Oral at bedtime    MEDICATIONS  (PRN):  acetaminophen   Tablet 650 milliGRAM(s) Oral every 6 hours PRN For Temp greater than 38 C (100.4 F)  acetaminophen   Tablet. 650 milliGRAM(s) Oral every 6 hours PRN Moderate Pain (4 - 6)  dextrose Gel 1 Dose(s) Oral once PRN Blood Glucose LESS THAN 70 milliGRAM(s)/deciliter  glucagon  Injectable 1 milliGRAM(s) IntraMuscular once PRN Glucose LESS THAN 70 milligrams/deciliter  hydrALAZINE Injectable 10 milliGRAM(s) IV Push every 8 hours PRN SBP>160  ondansetron Injectable 4 milliGRAM(s) IV Push every 6 hours PRN Nausea    Pertinent Labs: CBC Full  -  ( 02 Apr 2018 06:20 )  WBC Count : x  Hemoglobin : 8.3 g/dL  Hematocrit : 26.9 %  Platelet Count - Automated : x  Mean Cell Volume : x  Mean Cell Hemoglobin : x  Mean Cell Hemoglobin Concentration : x  Auto Neutrophil # : x  Auto Lymphocyte # : x  Auto Monocyte # : x  Auto Eosinophil # : x  Auto Basophil # : x  Auto Neutrophil % : x  Auto Lymphocyte % : x  Auto Monocyte % : x  Auto Eosinophil % : x  Auto Basophil % : x    04-01 Na131 mmol/L<L> Glu 219 mg/dL<H> K+ 3.4 mmol/L<L> Cr  2.95 mg/dL<H> BUN 16.0 mg/dL Phos 2.4 mg/dL Alb n/a   PAB n/a         Skin: no pressure ulcers noted     Nutrition focused physical exam NOT conducted - found signs of malnutrition [ ]absent [ ]present    Subcutaneous fat loss: [ ] Orbital fat pads region, [ ]Buccal fat region, [ ]Triceps region,  [ ]Ribs region    Muscle wasting: [ ]Temples region, [ ]Clavicle region, [ ]Shoulder region, [ ]Scapula region, [ ]Interosseous region,  [ ]thigh region, [ ]Calf region    Estimated Needs:   [x ] no change since previous assessment  [ ] recalculated:     Pt awaiting outpatient dialysis seat.     Current Nutrition Diagnosis: Increased nutrient needs related to increased demand as evidenced by pt with ESRD now on HD.     Recommendations:   1) consistent cho/renal with 1200ml/daily  2) Continue Nepro 1 can daily  3) Rx: Nephrovite/daily     Monitoring and Evaluation:   [x ] PO intake [x ] Tolerance to diet prescription [X] Weights  [X] Follow up per protocol [X] Labs:

## 2018-04-02 NOTE — PROGRESS NOTE ADULT - ASSESSMENT
57 y.o. F with hx of HTN, HLD, DMII, hyperkalemia, ckd 3 who presented with 3 weeks of postprandial vomiting and abd pain, noted to have ARF, fluid overload with bilateral pleural effusions and pericardial effusion as well as symptomatic anemia likely multifactorial secondary to acute on chronic renal failure and suspected upper GI Bleeding from vomiting/retching and NSAID use. Patient received 2 units of blood on admission with a good response. A trial of lasix was given and hospital course complicated by finding of uremic pericarditis which led to emergent HD and patient now being on permanent HD.  In the interim while being dialyzed the patient had an episode of PAF, s/p cardizem became rate controlled and remained in NSR. EP consulted. Unable to AC the patient due to initial concern for GI bleeding, for which GI consulted and tentatively GI intervention on 03/29/2018, cleared by cardio on 03/25/218. 4 days ago presented right arm ecchymosis on the cath site (cath was negative). Us of right arm reported superficial basilic vein distal thrombus but no DVT, pseudoaneurysm or hematoma. Now markedly improved. She is s/p A/V fistula on left arm: Brachial artery to Axillary vein (03/28/2018), HD 3/31/2018 (TTS schedule) and EGD done report pending and no GERALD as per cardiology.      In hospital procedures:   S/p R thoracocentesis on 3/20 with 1100 cc removed  S/P pleurocentesis done 3/21 on left sided with removal of 1 L. Fluid analysis consistent with transudate   s/p cath on 03/24/2018, NEG, EF 55%  s/p A/V fistula on left arm (03/28/2018)    Acute on Chronic Renal Failure  -CKD progressed to ESRD requiring HD, noted on arrival  -complicated by uremic pericarditis  -stable  -Permacath on right side, neck. Placed  03/22/2018  -s/p AVF/AVG on left arm: Brachial artery to Axillary vein on 03/28/2018 by vascular  -first ep HD complicated by new onset AFIB, since then has tolerated sessions well  -c/w standard HD sessions   -Acute hepatitis panel negative; ppd at 72 hours 0mm negative  -pending establishment of HD seat in community prior to DC    Anemia of chronic kidney disease:   -present on admission, stable  -due to worsening acute on chronic renal insufficiency in the setting of CKD with concomitant  iron deficiency   -s/p 4 pRBC total: with the last one being on 3/27 with a good Hgb response   -occult stool neg  -c/w PPI po bid   -sp EGD 3/30: altered vascular pattern in gastric fundus, body and antrum with scattered subepithelial petechiae and ecchymosis, otherwise normal exam  -continue EPOGEN with HD and ferrous sulfate    New Onset A Fib  -No further episodes of AFib since initial episode during HD, terminated by cardizem  -paroxysmal nature, currently in NSR  -will c/t coumadin a/p cardio recs, if unable to tolerate AC will likely need watchman procedure in future, recs appreciated   - no bleeding on EGD oral AC for stroke ppx started  -goal HR <110bpm, controlled well on b blocker  -s/p negative cath; no further plans for GERALD prior to d/c per cardio  -outpt follow up with cardio    Uremic Pericarditis  -present on admission, improved  -sp emergent HD to optimize fluid balance as part of tx  -cardio eval appreciated  -c/w tx with colchicine bid for a total of 4 weeks which will end on 4/5    RUE Basilic Vein Distal Thrombosis  -noted on US done during this admission  -complication of admission  -improved  -given superficial nature, no plans for AC specifically for this issue  -supportive care, warm compresses PRN, LUE elevation    HTN  -goal BP <140/90, uncontrolled but asymptomatic   -clonidine increased to 0.3mg BID  -c/w hydralazine to 50mg tid  -continue coreg 12.5 mg BID  -HD for continued volume control  - monitor BP and adjust medications as indicated    DM2 on long term insulin therapy complicated by ESRD on HD  -HBA1C: 7.6, goal <7, uncontrolled  -continue sliding scale  -c/w Accu-Cheks ac hs tid   -c/w hypoglycemia protocol  -only required 2 units on HSS yesterday, will attempt to reach goal AM fasting BS <200  -not on lantus at this time     DVT PPX  SCDs while in bed  Ambulate with assistance 57 y.o. F with hx of HTN, HLD, DMII, hyperkalemia, ckd 3 who presented with 3 weeks of postprandial vomiting and abd pain, noted to have ARF, fluid overload with bilateral pleural effusions and pericardial effusion as well as symptomatic anemia likely multifactorial secondary to acute on chronic renal failure and suspected upper GI Bleeding from vomiting/retching and NSAID use. Patient received 2 units of blood on admission with a good response. A trial of lasix was given and hospital course complicated by finding of uremic pericarditis which led to emergent HD and patient now being on permanent HD.  In the interim while being dialyzed the patient had an episode of PAF, s/p cardizem became rate controlled and remained in NSR. EP consulted. Unable to AC the patient due to initial concern for GI bleeding, for which GI consulted and tentatively GI intervention on 03/29/2018, cleared by cardio on 03/25/218. 4 days ago presented right arm ecchymosis on the cath site (cath was negative). Us of right arm reported superficial basilic vein distal thrombus but no DVT, pseudoaneurysm or hematoma. Now markedly improved. She is s/p A/V fistula on left arm: Brachial artery to Axillary vein (03/28/2018), HD 3/31/2018 (TTS schedule) and EGD done report pending and no GERALD as per cardiology.      In hospital procedures:   S/p R thoracocentesis on 3/20 with 1100 cc removed  S/P pleurocentesis done 3/21 on left sided with removal of 1 L. Fluid analysis consistent with transudate   s/p cath on 03/24/2018, NEG, EF 55%  s/p A/V fistula on left arm (03/28/2018)    Acute on Chronic Renal Failure  -CKD progressed to ESRD requiring HD, noted on arrival  -complicated by uremic pericarditis  -stable  -Permacath on right side, neck. Placed  03/22/2018  -s/p AVF/AVG on left arm: Brachial artery to Axillary vein on 03/28/2018 by vascular  -first ep HD complicated by new onset AFIB, since then has tolerated sessions well  -c/w standard HD sessions   -Acute hepatitis panel negative; ppd at 72 hours 0mm negative  -pending establishment of HD seat in community prior to DC, accepted at facility that does [facility], has been getting tx T TH SAT while inpatient, will reach out to nephro and have them transition her to that schedule    Anemia of chronic kidney disease:   -present on admission, STABLE  -due to worsening acute on chronic renal insufficiency in the setting of CKD with concomitant  iron deficiency   -s/p 4 pRBC total: with the last one being on 3/27 with a good Hgb response   -occult stool neg  -c/w PPI po bid   -sp EGD 3/30: altered vascular pattern in gastric fundus, body and antrum with scattered subepithelial petechiae and ecchymosis, otherwise normal exam  -continue EPOGEN with HD and ferrous sulfate    New Onset A Fib  -No further episodes of AFib since initial episode during HD, terminated by cardizem  -paroxysmal nature, currently in NSR  -will c/t coumadin a/p cardio recs, if unable to tolerate AC will likely need watchman procedure in future, recs appreciated   - no bleeding on EGD, oral AC for stroke ppx started, goal INR for coumadin 2-3, supratherapeutic, hold today and when able to resume give lower dose of 3mg  -goal HR <110bpm, controlled well on b blocker  -s/p negative cath; no further plans for GERALD prior to d/c per cardio  -outpt follow up with cardio    Uremic Pericarditis  -present on admission, IMPROVED  -sp emergent HD to optimize fluid balance as part of tx  -cardio eval appreciated  -c/w tx with colchicine bid for a total of 4 weeks which will end on 4/5    RUE Basilic Vein Distal Thrombosis  -noted on US done during this admission  -complication of admission  -improved  -given superficial nature, no plans for AC specifically for this issue  -supportive care, warm compresses PRN, LUE elevation    HTN  -goal BP <140/90, uncontrolled but asymptomatic   -clonidine increased to 0.3mg BID  -c/w hydralazine to 50mg tid  -continue coreg 12.5 mg BID  -HD for continued volume control  - monitor BP and adjust medications as indicated    DM2 on long term insulin therapy complicated by ESRD on HD  -HBA1C: 7.6, goal <7, uncontrolled  -continue sliding scale  -c/w Accu-Cheks ac hs tid   -c/w hypoglycemia protocol  -only required 2 units on HSS yesterday, will attempt to reach goal AM fasting BS <200  -not on lantus at this time     DVT PPX  SCDs while in bed  Ambulate with assistance

## 2018-04-02 NOTE — PROGRESS NOTE ADULT - SUBJECTIVE AND OBJECTIVE BOX
Resident Progress Note  Patient is a 57y old  Female who presents with a chief complaint of Nausea/vomiting and Chest pain (16 Mar 2018 17:20)    INTERVAL/OVERNIGHT EVENTS: Patient seen and examined bedside this morning. She denies any active complaints. Had a BM yesterday.     ROS: Denies chest pain, palpitations, sob, cough, fevers/chills, abdominal pain, n/v, diarrhea/constipation, dysuria or increased urinary frequency.    Vital Signs Last 24 Hrs  T(C): 36.4 (02 Apr 2018 00:27), Max: 36.8 (01 Apr 2018 16:15)  T(F): 97.6 (02 Apr 2018 00:27), Max: 98.3 (01 Apr 2018 16:15)  HR: 70 (02 Apr 2018 00:27) (67 - 73)  BP: 169/82 (02 Apr 2018 00:27) (163/82 - 187/92)  BP(mean): --  RR: 16 (02 Apr 2018 00:27) (16 - 20)  SpO2: 97% (02 Apr 2018 00:27) (92% - 98%)    PHYSICAL EXAM:  General: Adult female in NAD  HEENT: NC AT EOMI, moist mucous membranes.  Neck: Supple,  No JVD. R IJ HD access noted, site clean  Respiratory: Clear to auscultation bilaterally, no wheezing, rales, or rhonchi.  Cardiovascular: RRR, no murmurs, rubs, or gallops.  Gastrointestinal: BS+, soft, non-tender, no masses palpable  Extremities: No peripheral edema. L UE AV fistula, + thrill  Vascular: 2+ peripheral pulses.  Neurological: A/O x 3, no focal deficits.  Psychiatric: Normal mood, normal affect.  Musculoskeletal: moving all extremities  Skin: No rashes.                          8.3    x     )-----------( x        ( 02 Apr 2018 06:20 )             26.9   04-01    131<L>  |  92<L>  |  16.0  ----------------------------<  219<H>  3.4<L>   |  27.0  |  2.95<H>    Ca    7.9<L>      01 Apr 2018 06:01  Phos  2.4     04-01  Mg     1.7     04-01        MEDICATIONS  (STANDING):  artificial  tears Solution 1 Drop(s) Both EYES every 12 hours  aspirin enteric coated 81 milliGRAM(s) Oral daily  carvedilol 12.5 milliGRAM(s) Oral every 12 hours  cloNIDine 0.3 milliGRAM(s) Oral two times a day  colchicine 0.6 milliGRAM(s) Oral two times a day  dextrose 50% Injectable 12.5 Gram(s) IV Push once  dextrose 50% Injectable 25 Gram(s) IV Push once  dextrose 50% Injectable 25 Gram(s) IV Push once  docusate sodium 100 milliGRAM(s) Oral daily  epoetin gian Injectable 21291 Unit(s) IV Push <User Schedule>  ergocalciferol 81961 Unit(s) Oral every week  hydrALAZINE 50 milliGRAM(s) Oral every 8 hours  insulin lispro (HumaLOG) corrective regimen sliding scale   SubCutaneous three times a day before meals  pantoprazole    Tablet 40 milliGRAM(s) Oral before breakfast  senna 2 Tablet(s) Oral at bedtime  simvastatin 40 milliGRAM(s) Oral at bedtime    MEDICATIONS  (PRN):  acetaminophen   Tablet 650 milliGRAM(s) Oral every 6 hours PRN For Temp greater than 38 C (100.4 F)  acetaminophen   Tablet. 650 milliGRAM(s) Oral every 6 hours PRN Moderate Pain (4 - 6)  dextrose Gel 1 Dose(s) Oral once PRN Blood Glucose LESS THAN 70 milliGRAM(s)/deciliter  glucagon  Injectable 1 milliGRAM(s) IntraMuscular once PRN Glucose LESS THAN 70 milligrams/deciliter  hydrALAZINE Injectable 10 milliGRAM(s) IV Push every 8 hours PRN SBP>160  ondansetron Injectable 4 milliGRAM(s) IV Push every 6 hours PRN Nausea

## 2018-04-02 NOTE — PROGRESS NOTE ADULT - SUBJECTIVE AND OBJECTIVE BOX
NEPHROLOGY INTERVAL HPI/OVERNIGHT EVENTS:  pt clinically stable this AM  no acute distress  no cp, sob, n/v/d    MEDICATIONS  (STANDING):  artificial  tears Solution 1 Drop(s) Both EYES every 12 hours  aspirin enteric coated 81 milliGRAM(s) Oral daily  carvedilol 12.5 milliGRAM(s) Oral every 12 hours  cloNIDine 0.3 milliGRAM(s) Oral two times a day  colchicine 0.6 milliGRAM(s) Oral two times a day  dextrose 50% Injectable 12.5 Gram(s) IV Push once  dextrose 50% Injectable 25 Gram(s) IV Push once  dextrose 50% Injectable 25 Gram(s) IV Push once  docusate sodium 100 milliGRAM(s) Oral daily  epoetin gian Injectable 73725 Unit(s) IV Push <User Schedule>  ergocalciferol 30421 Unit(s) Oral every week  hydrALAZINE 50 milliGRAM(s) Oral every 8 hours  insulin lispro (HumaLOG) corrective regimen sliding scale   SubCutaneous three times a day before meals  pantoprazole    Tablet 40 milliGRAM(s) Oral before breakfast  senna 2 Tablet(s) Oral at bedtime  simvastatin 40 milliGRAM(s) Oral at bedtime    MEDICATIONS  (PRN):  acetaminophen   Tablet 650 milliGRAM(s) Oral every 6 hours PRN For Temp greater than 38 C (100.4 F)  acetaminophen   Tablet. 650 milliGRAM(s) Oral every 6 hours PRN Moderate Pain (4 - 6)  dextrose Gel 1 Dose(s) Oral once PRN Blood Glucose LESS THAN 70 milliGRAM(s)/deciliter  glucagon  Injectable 1 milliGRAM(s) IntraMuscular once PRN Glucose LESS THAN 70 milligrams/deciliter  hydrALAZINE Injectable 10 milliGRAM(s) IV Push every 8 hours PRN SBP>160  ondansetron Injectable 4 milliGRAM(s) IV Push every 6 hours PRN Nausea      Allergies    No Known Allergies          Vital Signs Last 24 Hrs  T(C): 36.4 (02 Apr 2018 00:27), Max: 36.8 (01 Apr 2018 16:15)  T(F): 97.6 (02 Apr 2018 00:27), Max: 98.3 (01 Apr 2018 16:15)  HR: 70 (02 Apr 2018 00:27) (67 - 73)  BP: 169/82 (02 Apr 2018 00:27) (163/82 - 187/92)  BP(mean): --  RR: 16 (02 Apr 2018 00:27) (16 - 20)  SpO2: 97% (02 Apr 2018 00:27) (92% - 98%)    PHYSICAL EXAM:  GENERAL: NAD  NECK: Supple, No JVD  NERVOUS SYSTEM:  Alert & Oriented X3,  intact and symmetric  CHEST/LUNG: Clear bilaterally   HEART: Regular rate and rhythm; No rub  ABDOMEN: Soft, +BS  EXTREMITIES:  2+ Peripheral Pulses, tr edema; L AVF +thrill +bruit  SKIN: No rashes or lesions    LABS:                        8.3    x     )-----------( x        ( 02 Apr 2018 06:20 )             26.9   Hemoglobin: 8.8 g/dL (04.01.18 @ 06:01)        04-01    131<L>  |  92<L>  |  16.0  ----------------------------<  219<H>  3.4<L>   |  27.0  |  2.95<H>    Ca    7.9<L>      01 Apr 2018 06:01  Phos  2.4     04-01  Mg     1.7     04-01      PT/INR - ( 02 Apr 2018 06:20 )   PT: 39.3 sec;   INR: 3.48 ratio         PTT - ( 02 Apr 2018 06:20 )  PTT:39.4 sec    % Saturation, Iron: 21 % (03.29.18 @ 08:34)    Ferritin, Serum: 854 ng/mL (03.29.18 @ 08:34)        RADIOLOGY & ADDITIONAL TESTS:

## 2018-04-02 NOTE — PROGRESS NOTE ADULT - SUBJECTIVE AND OBJECTIVE BOX
INTERVAL HPI/OVERNIGHT EVENTS: Patient with multiple medical issues with hematemesis with gastritis on EGD. Biopsy results pending.     MEDICATIONS  (STANDING):  artificial  tears Solution 1 Drop(s) Both EYES every 12 hours  aspirin enteric coated 81 milliGRAM(s) Oral daily  carvedilol 12.5 milliGRAM(s) Oral every 12 hours  cloNIDine 0.3 milliGRAM(s) Oral two times a day  colchicine 0.6 milliGRAM(s) Oral two times a day  dextrose 50% Injectable 12.5 Gram(s) IV Push once  dextrose 50% Injectable 25 Gram(s) IV Push once  dextrose 50% Injectable 25 Gram(s) IV Push once  docusate sodium 100 milliGRAM(s) Oral daily  epoetin gian Injectable 93305 Unit(s) IV Push <User Schedule>  ergocalciferol 09507 Unit(s) Oral every week  hydrALAZINE 50 milliGRAM(s) Oral every 8 hours  insulin lispro (HumaLOG) corrective regimen sliding scale   SubCutaneous three times a day before meals  pantoprazole    Tablet 40 milliGRAM(s) Oral before breakfast  senna 2 Tablet(s) Oral at bedtime  simvastatin 40 milliGRAM(s) Oral at bedtime    MEDICATIONS  (PRN):  acetaminophen   Tablet 650 milliGRAM(s) Oral every 6 hours PRN For Temp greater than 38 C (100.4 F)  acetaminophen   Tablet. 650 milliGRAM(s) Oral every 6 hours PRN Moderate Pain (4 - 6)  dextrose Gel 1 Dose(s) Oral once PRN Blood Glucose LESS THAN 70 milliGRAM(s)/deciliter  glucagon  Injectable 1 milliGRAM(s) IntraMuscular once PRN Glucose LESS THAN 70 milligrams/deciliter  hydrALAZINE Injectable 10 milliGRAM(s) IV Push every 8 hours PRN SBP>160  ondansetron Injectable 4 milliGRAM(s) IV Push every 6 hours PRN Nausea      Allergies    No Known Allergies    Intolerances        Vital Signs Last 24 Hrs  T(C): 35.9 (02 Apr 2018 08:16), Max: 36.8 (01 Apr 2018 16:15)  T(F): 96.6 (02 Apr 2018 08:16), Max: 98.3 (01 Apr 2018 16:15)  HR: 69 (02 Apr 2018 08:16) (67 - 73)  BP: 146/77 (02 Apr 2018 08:16) (146/77 - 187/92)  BP(mean): --  RR: 16 (02 Apr 2018 08:16) (16 - 20)  SpO2: 94% (02 Apr 2018 08:16) (92% - 98%)    LABS:                        8.3    x     )-----------( x        ( 02 Apr 2018 06:20 )             26.9     04-01    131<L>  |  92<L>  |  16.0  ----------------------------<  219<H>  3.4<L>   |  27.0  |  2.95<H>    Ca    7.9<L>      01 Apr 2018 06:01  Phos  2.4     04-01  Mg     1.7     04-01      PT/INR - ( 02 Apr 2018 06:20 )   PT: 39.3 sec;   INR: 3.48 ratio         PTT - ( 02 Apr 2018 06:20 )  PTT:39.4 sec      RADIOLOGY & ADDITIONAL TESTS:

## 2018-04-02 NOTE — PROGRESS NOTE ADULT - ATTENDING COMMENTS
Patient seen and examined at the bedside. Agree with the above history, physical, assessment, and plan with the necessary amendments/elaborations already made above.

## 2018-04-02 NOTE — PROGRESS NOTE ADULT - ASSESSMENT
Patient with gastritis, ESRD now feels better    1. Await pathology results. Treat for H. pylori if positive. Continue PPI

## 2018-04-03 VITALS
TEMPERATURE: 98 F | SYSTOLIC BLOOD PRESSURE: 160 MMHG | OXYGEN SATURATION: 94 % | RESPIRATION RATE: 18 BRPM | HEART RATE: 68 BPM | DIASTOLIC BLOOD PRESSURE: 81 MMHG

## 2018-04-03 LAB
ANION GAP SERPL CALC-SCNC: 12 MMOL/L — SIGNIFICANT CHANGE UP (ref 5–17)
APTT BLD: 103.6 SEC — HIGH (ref 27.5–37.4)
BUN SERPL-MCNC: 33 MG/DL — HIGH (ref 8–20)
CALCIUM SERPL-MCNC: 8.1 MG/DL — LOW (ref 8.6–10.2)
CHLORIDE SERPL-SCNC: 88 MMOL/L — LOW (ref 98–107)
CO2 SERPL-SCNC: 25 MMOL/L — SIGNIFICANT CHANGE UP (ref 22–29)
CREAT SERPL-MCNC: 4.5 MG/DL — HIGH (ref 0.5–1.3)
GLUCOSE BLDC GLUCOMTR-MCNC: 150 MG/DL — HIGH (ref 70–99)
GLUCOSE BLDC GLUCOMTR-MCNC: 162 MG/DL — HIGH (ref 70–99)
GLUCOSE SERPL-MCNC: 181 MG/DL — HIGH (ref 70–115)
HCT VFR BLD CALC: 27 % — LOW (ref 37–47)
HGB BLD-MCNC: 8.4 G/DL — LOW (ref 12–16)
INR BLD: 3.75 RATIO — HIGH (ref 0.88–1.16)
MCHC RBC-ENTMCNC: 27.4 PG — SIGNIFICANT CHANGE UP (ref 27–31)
MCHC RBC-ENTMCNC: 31.1 G/DL — LOW (ref 32–36)
MCV RBC AUTO: 87.9 FL — SIGNIFICANT CHANGE UP (ref 81–99)
PHOSPHATE SERPL-MCNC: 3.8 MG/DL — SIGNIFICANT CHANGE UP (ref 2.4–4.7)
PLATELET # BLD AUTO: 471 K/UL — HIGH (ref 150–400)
POTASSIUM SERPL-MCNC: 4.1 MMOL/L — SIGNIFICANT CHANGE UP (ref 3.5–5.3)
POTASSIUM SERPL-SCNC: 4.1 MMOL/L — SIGNIFICANT CHANGE UP (ref 3.5–5.3)
PROTHROM AB SERPL-ACNC: 42.4 SEC — HIGH (ref 9.8–12.7)
RBC # BLD: 3.07 M/UL — LOW (ref 4.4–5.2)
RBC # FLD: 15.5 % — SIGNIFICANT CHANGE UP (ref 11–15.6)
SODIUM SERPL-SCNC: 125 MMOL/L — LOW (ref 135–145)
SURGICAL PATHOLOGY FINAL REPORT - CH: SIGNIFICANT CHANGE UP
WBC # BLD: 7 K/UL — SIGNIFICANT CHANGE UP (ref 4.8–10.8)
WBC # FLD AUTO: 7 K/UL — SIGNIFICANT CHANGE UP (ref 4.8–10.8)

## 2018-04-03 PROCEDURE — 99239 HOSP IP/OBS DSCHRG MGMT >30: CPT

## 2018-04-03 RX ORDER — INSULIN LISPRO 100/ML
1 VIAL (ML) SUBCUTANEOUS
Qty: 300 | Refills: 0 | OUTPATIENT
Start: 2018-04-03 | End: 2018-05-02

## 2018-04-03 RX ORDER — COLCHICINE 0.6 MG
1 TABLET ORAL
Qty: 0 | Refills: 0 | COMMUNITY
Start: 2018-04-03

## 2018-04-03 RX ORDER — CARVEDILOL PHOSPHATE 80 MG/1
1 CAPSULE, EXTENDED RELEASE ORAL
Qty: 60 | Refills: 0 | OUTPATIENT
Start: 2018-04-03 | End: 2018-05-02

## 2018-04-03 RX ORDER — ASPIRIN/CALCIUM CARB/MAGNESIUM 324 MG
1 TABLET ORAL
Qty: 30 | Refills: 0 | OUTPATIENT
Start: 2018-04-03 | End: 2018-05-02

## 2018-04-03 RX ORDER — HYDRALAZINE HCL 50 MG
1 TABLET ORAL
Qty: 90 | Refills: 0 | OUTPATIENT
Start: 2018-04-03 | End: 2018-05-02

## 2018-04-03 RX ORDER — SENNA PLUS 8.6 MG/1
2 TABLET ORAL
Qty: 0 | Refills: 0 | COMMUNITY
Start: 2018-04-03

## 2018-04-03 RX ORDER — DOCUSATE SODIUM 100 MG
1 CAPSULE ORAL
Qty: 0 | Refills: 0 | COMMUNITY
Start: 2018-04-03

## 2018-04-03 RX ORDER — SITAGLIPTIN AND METFORMIN HYDROCHLORIDE 500; 50 MG/1; MG/1
1 TABLET, FILM COATED ORAL
Qty: 0 | Refills: 0 | COMMUNITY

## 2018-04-03 RX ORDER — PANTOPRAZOLE SODIUM 20 MG/1
1 TABLET, DELAYED RELEASE ORAL
Qty: 0 | Refills: 0 | COMMUNITY
Start: 2018-04-03

## 2018-04-03 RX ORDER — COLCHICINE 0.6 MG
1 TABLET ORAL
Qty: 50 | Refills: 0 | OUTPATIENT
Start: 2018-04-03 | End: 2018-04-27

## 2018-04-03 RX ADMIN — Medication 1 DROP(S): at 05:08

## 2018-04-03 RX ADMIN — Medication 100 MILLIGRAM(S): at 11:47

## 2018-04-03 RX ADMIN — Medication 0.6 MILLIGRAM(S): at 05:08

## 2018-04-03 RX ADMIN — Medication 81 MILLIGRAM(S): at 11:45

## 2018-04-03 RX ADMIN — ERYTHROPOIETIN 10000 UNIT(S): 10000 INJECTION, SOLUTION INTRAVENOUS; SUBCUTANEOUS at 10:01

## 2018-04-03 RX ADMIN — PANTOPRAZOLE SODIUM 40 MILLIGRAM(S): 20 TABLET, DELAYED RELEASE ORAL at 05:08

## 2018-04-03 RX ADMIN — CARVEDILOL PHOSPHATE 12.5 MILLIGRAM(S): 80 CAPSULE, EXTENDED RELEASE ORAL at 05:08

## 2018-04-03 RX ADMIN — Medication 0.3 MILLIGRAM(S): at 05:08

## 2018-04-03 RX ADMIN — Medication 50 MILLIGRAM(S): at 14:24

## 2018-04-03 RX ADMIN — ONDANSETRON 4 MILLIGRAM(S): 8 TABLET, FILM COATED ORAL at 07:54

## 2018-04-03 RX ADMIN — Medication 1: at 11:46

## 2018-04-03 RX ADMIN — Medication 50 MILLIGRAM(S): at 05:07

## 2018-04-03 NOTE — PROGRESS NOTE ADULT - PROVIDER SPECIALTY LIST ADULT
Cardiology
Family Medicine
Gastroenterology
Intervent Radiology
Nephrology
Surgery
Vascular Surgery
Anesthesia
Cardiology
Family Medicine
Vascular Surgery
Cardiology
Nephrology
Family Medicine
Gastroenterology
Family Medicine
Gastroenterology
Gastroenterology
Family Medicine
Cardiology
Family Medicine
Family Medicine

## 2018-04-03 NOTE — PHYSICAL THERAPY INITIAL EVALUATION ADULT - PERTINENT HX OF CURRENT PROBLEM, REHAB EVAL
pt presents to [facility] due to nausea, vomiting, ARF, fluid overload, anemia, PNA, right thoracocentesis 3/20, right UE thrombosis, pleurocentesis 3/21, AV fistula 3/28 left arm, pericarditis, afib

## 2018-04-03 NOTE — PROGRESS NOTE ADULT - ASSESSMENT
57 y.o. F with hx of HTN, HLD, DMII, hyperkalemia, ckd 3 who presented with 3 weeks of postprandial vomiting and abd pain, noted to have ARF, fluid overload with bilateral pleural effusions and pericardial effusion as well as symptomatic anemia likely multifactorial secondary to acute on chronic renal failure and suspected upper GI Bleeding from vomiting/retching and NSAID use. Patient received 2 units of blood on admission with a good response. A trial of lasix was given and hospital course complicated by finding of uremic pericarditis which led to emergent HD and patient now being on permanent HD.  In the interim while being dialyzed the patient had an episode of PAF, s/p cardizem became rate controlled and remained in NSR. EP consulted. Unable to AC the patient due to initial concern for GI bleeding, for which GI consulted and tentatively GI intervention on 03/29/2018, cleared by cardio on 03/25/218. 4 days ago presented right arm ecchymosis on the cath site (cath was negative). Us of right arm reported superficial basilic vein distal thrombus but no DVT, pseudoaneurysm or hematoma. Now markedly improved. She is s/p A/V fistula on left arm: Brachial artery to Axillary vein (03/28/2018), HD 3/31/2018 (TTS schedule) and EGD done report pending and no GERALD as per cardiology. Currently receiving dialysis T/Th/Sat though HD seat may be available MWF, informed nephrology.       In hospital procedures:   S/p R thoracocentesis on 3/20 with 1100 cc removed  S/P pleurocentesis done 3/21 on left sided with removal of 1 L. Fluid analysis consistent with transudate   s/p cath on 03/24/2018, NEG, EF 55%  s/p A/V fistula on left arm (03/28/2018)    Acute on Chronic Renal Failure  -CKD progressed to ESRD requiring HD, noted on arrival  -complicated by uremic pericarditis  -stable  -Permacath on right side, neck. Placed  03/22/2018  -s/p AVF/AVG on left arm: Brachial artery to Axillary vein on 03/28/2018 by vascular  -first ep HD complicated by new onset AFIB, since then has tolerated sessions well  -c/w standard HD sessions   -Acute hepatitis panel negative; ppd at 72 hours 0mm negative  -pending establishment of HD seat in community prior to DC, accepted at facility that does [facility], has been getting tx T TH SAT while inpatient, nephro aware and will transition to that schedule    Anemia of chronic kidney disease:   -present on admission, STABLE  -due to worsening acute on chronic renal insufficiency in the setting of CKD with concomitant  iron deficiency   -s/p 4 pRBC total: with the last one being on 3/27 with a good Hgb response   -occult stool neg  -c/w PPI po bid   -sp EGD 3/30: altered vascular pattern in gastric fundus, body and antrum with scattered subepithelial petechiae and ecchymosis, otherwise normal exam  -continue EPOGEN with HD and ferrous sulfate    New Onset A Fib  -No further episodes of AFib since initial episode during HD, terminated by cardizem  -paroxysmal nature, currently in NSR  -will c/t coumadin a/p cardio recs, if unable to tolerate AC will likely need watchman procedure in future, recs appreciated   - no bleeding on EGD, oral AC for stroke ppx started, goal INR for coumadin 2-3, supratherapeutic, hold today and when able to resume give lower dose of 3mg  -goal HR <110bpm, controlled well on b blocker  -s/p negative cath; no further plans for GERALD prior to d/c per cardio  -outpt follow up with cardio    Uremic Pericarditis  -present on admission, IMPROVED  -sp emergent HD to optimize fluid balance as part of tx  -cardio eval appreciated  -c/w tx with colchicine bid for a total of 4 weeks which will end on 4/5    RUE Basilic Vein Distal Thrombosis  -noted on US done during this admission  -complication of admission  -improved  -given superficial nature, no plans for AC specifically for this issue  -supportive care, warm compresses PRN, LUE elevation    HTN  -goal BP <140/90, uncontrolled but asymptomatic   -clonidine increased to 0.3mg BID  -c/w hydralazine to 50mg tid  -continue coreg 12.5 mg BID  -HD for continued volume control  - monitor BP and adjust medications as indicated    DM2 on long term insulin therapy complicated by ESRD on HD  -HBA1C: 7.6, goal <7, uncontrolled  -continue sliding scale  -c/w Accu-Cheks ac hs tid   -c/w hypoglycemia protocol  -only required 7 units on HSS yesterday, will attempt to reach goal AM fasting BS <200  -not on lantus at this time   -if continues to require more than 2-3 units on HSS will consider adding lantus    DVT PPX  SCDs while in bed  Ambulate with assistance

## 2018-04-03 NOTE — PROGRESS NOTE ADULT - NSHPATTENDINGPLANDISCUSS_GEN_ALL_CORE
nurse
the patient.  All imaging and results of lab/other studies reviewed by me. All questions answered to the satisfaction of the patient. At this time they agree with the current plan of therapy.
patient and nursing staff
patient, Nursing staff and Cardiology
Nursing staff, social work/case management
the patient.  All imaging and results of lab/other studies reviewed by me. All questions answered to the satisfaction of the patient. At this time they agree with the current plan of therapy.
Patient
Cardiology, GI and Nursing staff
Patient
Patient

## 2018-04-03 NOTE — PROGRESS NOTE ADULT - ATTENDING COMMENTS
Patient seen and examined at the bedside. Agree with the above history, physical, assessment, and plan with the necessary amendments/elaborations below:    Pt clinically stable, doing very well. HD session today, nephro aware that pt to start outpatient HD tomorrow, will be transitioned to F scheduling. not likely to have full session tomorrow, only partial.     regarding couamdin, pt successfully started on coumadin however noted to be supratherapeutic for the second day. no adverse signs noted, no blood loss or acute on chronic anemia requiring any intervention. hold coumadin x2 days, pt to be seen in office by cardio on thurs or friday, no coumadin until then, will have repeat inr and will have script given for continued coumadin dosing and checks by them, will likely need 3mg dose.

## 2018-04-03 NOTE — PROGRESS NOTE ADULT - SUBJECTIVE AND OBJECTIVE BOX
Resident Progress Note  Patient is a 57y old  Female who presents with a chief complaint of Nausea/vomiting and Chest pain (16 Mar 2018 17:20)    INTERVAL/OVERNIGHT EVENTS: Patient seen and examined at dialysis this morning, currently receiving dialysis.  She states she feels shortness of breath at this time, is saturating 98% on 2L NC. Denies CP, palpitations, lightheadedness, n/v.     Other ROS unremarkable.     Vital Signs Last 24 Hrs  T(C): 36.5 (03 Apr 2018 05:06), Max: 36.5 (02 Apr 2018 21:50)  T(F): 97.7 (03 Apr 2018 05:06), Max: 97.7 (02 Apr 2018 21:50)  HR: 79 (03 Apr 2018 05:06) (66 - 82)  BP: 158/79 (03 Apr 2018 05:06) (146/77 - 166/82)  BP(mean): --  RR: 18 (03 Apr 2018 05:06) (16 - 18)  SpO2: 94% (03 Apr 2018 05:06) (90% - 97%)    PHYSICAL EXAM:  General: Adult female in NAD, receiving dialysis   HEENT: NC AT EOMI, moist mucous membranes.  Neck: Supple,  No JVD. R IJ HD access noted, site clean  Respiratory: Clear to auscultation bilaterally, no wheezing, rales, or rhonchi.  Cardiovascular: RRR, no murmurs, rubs, or gallops.  Gastrointestinal: BS+, soft, non-tender, no masses palpable  Extremities: No peripheral edema. L UE AV fistula, + thrill  Vascular: 2+ peripheral pulses.   Neurological: A/O x 3, no focal deficits.  Psychiatric: Normal mood, normal affect.  Musculoskeletal: moving all extremities  Skin: No rashes.                           8.3    x     )-----------( x        ( 02 Apr 2018 06:20 )             26.9   MEDICATIONS  (STANDING):  artificial  tears Solution 1 Drop(s) Both EYES every 12 hours  aspirin enteric coated 81 milliGRAM(s) Oral daily  carvedilol 12.5 milliGRAM(s) Oral every 12 hours  cloNIDine 0.3 milliGRAM(s) Oral two times a day  colchicine 0.6 milliGRAM(s) Oral two times a day  dextrose 50% Injectable 12.5 Gram(s) IV Push once  dextrose 50% Injectable 25 Gram(s) IV Push once  dextrose 50% Injectable 25 Gram(s) IV Push once  docusate sodium 100 milliGRAM(s) Oral daily  epoetin gian Injectable 82584 Unit(s) IV Push <User Schedule>  ergocalciferol 96850 Unit(s) Oral every week  hydrALAZINE 50 milliGRAM(s) Oral every 8 hours  insulin lispro (HumaLOG) corrective regimen sliding scale   SubCutaneous three times a day before meals  pantoprazole    Tablet 40 milliGRAM(s) Oral before breakfast  senna 2 Tablet(s) Oral at bedtime  simvastatin 40 milliGRAM(s) Oral at bedtime    MEDICATIONS  (PRN):  acetaminophen   Tablet 650 milliGRAM(s) Oral every 6 hours PRN For Temp greater than 38 C (100.4 F)  acetaminophen   Tablet. 650 milliGRAM(s) Oral every 6 hours PRN Moderate Pain (4 - 6)  dextrose Gel 1 Dose(s) Oral once PRN Blood Glucose LESS THAN 70 milliGRAM(s)/deciliter  glucagon  Injectable 1 milliGRAM(s) IntraMuscular once PRN Glucose LESS THAN 70 milligrams/deciliter  hydrALAZINE Injectable 10 milliGRAM(s) IV Push every 8 hours PRN SBP>160  ondansetron Injectable 4 milliGRAM(s) IV Push every 6 hours PRN Nausea Resident Progress Note  Patient is a 57y old  Female who presents with a chief complaint of Nausea/vomiting and Chest pain (16 Mar 2018 17:20)    INTERVAL/OVERNIGHT EVENTS: Patient seen and examined at dialysis this morning, currently receiving dialysis.  She states she feels shortness of breath at this time, is saturating 98% on 2L NC. Denies CP, palpitations, lightheadedness, n/v.       Other ROS unremarkable.     BP during dialysis; 179/87    P 65  Vital Signs Last 24 Hrs  T(C): 36.5 (03 Apr 2018 05:06), Max: 36.5 (02 Apr 2018 21:50)  T(F): 97.7 (03 Apr 2018 05:06), Max: 97.7 (02 Apr 2018 21:50)  HR: 79 (03 Apr 2018 05:06) (66 - 82)  BP: 158/79 (03 Apr 2018 05:06) (146/77 - 166/82)  BP(mean): --  RR: 18 (03 Apr 2018 05:06) (16 - 18)  SpO2: 94% (03 Apr 2018 05:06) (90% - 97%)    PHYSICAL EXAM:  General: Adult female in NAD, receiving dialysis   HEENT: NC AT EOMI, moist mucous membranes.  Neck: Supple,  No JVD. R IJ HD access noted, site clean  Respiratory: Clear to auscultation bilaterally, no wheezing, rales, or rhonchi.  Cardiovascular: RRR, no murmurs, rubs, or gallops.  Gastrointestinal: BS+, soft, non-tender, no masses palpable  Extremities: No peripheral edema. L UE AV fistula, + thrill  Vascular: 2+ peripheral pulses.   Neurological: A/O x 3, no focal deficits.  Psychiatric: Normal mood, normal affect.  Musculoskeletal: moving all extremities  Skin: No rashes.                           8.3    x     )-----------( x        ( 02 Apr 2018 06:20 )             26.9   MEDICATIONS  (STANDING):  artificial  tears Solution 1 Drop(s) Both EYES every 12 hours  aspirin enteric coated 81 milliGRAM(s) Oral daily  carvedilol 12.5 milliGRAM(s) Oral every 12 hours  cloNIDine 0.3 milliGRAM(s) Oral two times a day  colchicine 0.6 milliGRAM(s) Oral two times a day  dextrose 50% Injectable 12.5 Gram(s) IV Push once  dextrose 50% Injectable 25 Gram(s) IV Push once  dextrose 50% Injectable 25 Gram(s) IV Push once  docusate sodium 100 milliGRAM(s) Oral daily  epoetin gian Injectable 64739 Unit(s) IV Push <User Schedule>  ergocalciferol 07991 Unit(s) Oral every week  hydrALAZINE 50 milliGRAM(s) Oral every 8 hours  insulin lispro (HumaLOG) corrective regimen sliding scale   SubCutaneous three times a day before meals  pantoprazole    Tablet 40 milliGRAM(s) Oral before breakfast  senna 2 Tablet(s) Oral at bedtime  simvastatin 40 milliGRAM(s) Oral at bedtime    MEDICATIONS  (PRN):  acetaminophen   Tablet 650 milliGRAM(s) Oral every 6 hours PRN For Temp greater than 38 C (100.4 F)  acetaminophen   Tablet. 650 milliGRAM(s) Oral every 6 hours PRN Moderate Pain (4 - 6)  dextrose Gel 1 Dose(s) Oral once PRN Blood Glucose LESS THAN 70 milliGRAM(s)/deciliter  glucagon  Injectable 1 milliGRAM(s) IntraMuscular once PRN Glucose LESS THAN 70 milligrams/deciliter  hydrALAZINE Injectable 10 milliGRAM(s) IV Push every 8 hours PRN SBP>160  ondansetron Injectable 4 milliGRAM(s) IV Push every 6 hours PRN Nausea

## 2018-04-03 NOTE — PROGRESS NOTE ADULT - SUBJECTIVE AND OBJECTIVE BOX
Patient was seen and evaluated on dialysis earlier in am  No c/o CP SOB NV  no F/C  mid swelling    T(C): 36.6 (04-03-18 @ 16:44), Max: 36.6 (04-03-18 @ 10:35)  HR: 68 (04-03-18 @ 16:44) (65 - 82)  BP: 160/81 (04-03-18 @ 16:44) (152/77 - 179/87)  Wt(kg): --  PE ;  NAD  lungs - CTA  CV gr 1 murmer,  No gallop or rub  Abd : soft, NT BS +, No masses  Ext- + edema  Neuro : Grossly intact, moving extremities                           8.4    7.0   )-----------( 471      ( 03 Apr 2018 07:51 )             27.0        04-03    125<L>  |  88<L>  |  33.0<H>  ----------------------------<  181<H>  4.1   |  25.0  |  4.50<H>    Ca    8.1<L>      03 Apr 2018 07:51  Phos  3.8     04-03        MEDICATIONS  (STANDING):  acetaminophen   Tablet PRN  acetaminophen   Tablet. PRN  artificial  tears Solution  aspirin enteric coated  carvedilol  cloNIDine  colchicine  dextrose 50% Injectable  dextrose 50% Injectable  dextrose 50% Injectable  dextrose Gel PRN  docusate sodium  epoetin gian Injectable  ergocalciferol  glucagon  Injectable PRN  hydrALAZINE  hydrALAZINE Injectable PRN  insulin lispro (HumaLOG) corrective regimen sliding scale  ondansetron Injectable PRN  pantoprazole    Tablet  senna  simvastatin      Patient stable  Radha HD easily  Continue

## 2018-04-10 LAB
CULTURE RESULTS: SIGNIFICANT CHANGE UP
SPECIMEN SOURCE: SIGNIFICANT CHANGE UP

## 2018-04-24 ENCOUNTER — APPOINTMENT (OUTPATIENT)
Dept: CARDIOLOGY | Facility: CLINIC | Age: 58
End: 2018-04-24
Payer: MEDICAID

## 2018-04-24 ENCOUNTER — NON-APPOINTMENT (OUTPATIENT)
Age: 58
End: 2018-04-24

## 2018-04-24 ENCOUNTER — APPOINTMENT (OUTPATIENT)
Dept: GASTROENTEROLOGY | Facility: CLINIC | Age: 58
End: 2018-04-24
Payer: MEDICAID

## 2018-04-24 ENCOUNTER — APPOINTMENT (OUTPATIENT)
Dept: CARDIOLOGY | Facility: CLINIC | Age: 58
End: 2018-04-24

## 2018-04-24 VITALS
RESPIRATION RATE: 16 BRPM | DIASTOLIC BLOOD PRESSURE: 98 MMHG | BODY MASS INDEX: 24.49 KG/M2 | HEIGHT: 65 IN | HEART RATE: 86 BPM | OXYGEN SATURATION: 96 % | SYSTOLIC BLOOD PRESSURE: 163 MMHG | WEIGHT: 147 LBS

## 2018-04-24 VITALS
HEART RATE: 83 BPM | WEIGHT: 150 LBS | BODY MASS INDEX: 24.99 KG/M2 | HEIGHT: 65 IN | OXYGEN SATURATION: 96 % | SYSTOLIC BLOOD PRESSURE: 158 MMHG | DIASTOLIC BLOOD PRESSURE: 89 MMHG

## 2018-04-24 DIAGNOSIS — D47.3 ESSENTIAL (HEMORRHAGIC) THROMBOCYTHEMIA: ICD-10-CM

## 2018-04-24 DIAGNOSIS — Z56.0 UNEMPLOYMENT, UNSPECIFIED: ICD-10-CM

## 2018-04-24 DIAGNOSIS — M1A.0220 IDIOPATHIC CHRONIC GOUT, LEFT ELBOW, W/OUT TOPHUS (TOPHI): ICD-10-CM

## 2018-04-24 PROCEDURE — 82270 OCCULT BLOOD FECES: CPT

## 2018-04-24 PROCEDURE — 93000 ELECTROCARDIOGRAM COMPLETE: CPT

## 2018-04-24 PROCEDURE — 99214 OFFICE O/P EST MOD 30 MIN: CPT

## 2018-04-24 PROCEDURE — 99215 OFFICE O/P EST HI 40 MIN: CPT

## 2018-04-24 RX ORDER — METOPROLOL TARTRATE 50 MG/1
50 TABLET, FILM COATED ORAL
Qty: 180 | Refills: 2 | Status: DISCONTINUED | COMMUNITY
End: 2018-04-24

## 2018-04-24 RX ORDER — COLCHICINE 0.6 MG/1
0.6 TABLET ORAL
Refills: 0 | Status: DISCONTINUED | COMMUNITY
End: 2018-04-24

## 2018-04-24 SDOH — ECONOMIC STABILITY - INCOME SECURITY: UNEMPLOYMENT, UNSPECIFIED: Z56.0

## 2018-05-13 ENCOUNTER — INPATIENT (INPATIENT)
Facility: HOSPITAL | Age: 58
LOS: 2 days | Discharge: HOME | DRG: 314 | End: 2018-05-16
Attending: INTERNAL MEDICINE | Admitting: HOSPITALIST
Payer: COMMERCIAL

## 2018-05-13 VITALS
WEIGHT: 145.95 LBS | DIASTOLIC BLOOD PRESSURE: 83 MMHG | HEART RATE: 74 BPM | RESPIRATION RATE: 18 BRPM | HEIGHT: 65 IN | OXYGEN SATURATION: 99 % | SYSTOLIC BLOOD PRESSURE: 131 MMHG | TEMPERATURE: 98 F

## 2018-05-13 DIAGNOSIS — R07.9 CHEST PAIN, UNSPECIFIED: ICD-10-CM

## 2018-05-13 LAB
ALBUMIN SERPL ELPH-MCNC: 3 G/DL — LOW (ref 3.3–5.2)
ALP SERPL-CCNC: 88 U/L — SIGNIFICANT CHANGE UP (ref 40–120)
ALT FLD-CCNC: 18 U/L — SIGNIFICANT CHANGE UP
ANION GAP SERPL CALC-SCNC: 16 MMOL/L — SIGNIFICANT CHANGE UP (ref 5–17)
APPEARANCE UR: ABNORMAL
APTT BLD: 31.5 SEC — SIGNIFICANT CHANGE UP (ref 27.5–37.4)
AST SERPL-CCNC: 52 U/L — HIGH
BACTERIA # UR AUTO: ABNORMAL
BASOPHILS # BLD AUTO: 0 K/UL — SIGNIFICANT CHANGE UP (ref 0–0.2)
BASOPHILS NFR BLD AUTO: 0.2 % — SIGNIFICANT CHANGE UP (ref 0–2)
BILIRUB SERPL-MCNC: 0.3 MG/DL — LOW (ref 0.4–2)
BILIRUB UR-MCNC: NEGATIVE — SIGNIFICANT CHANGE UP
BUN SERPL-MCNC: 47 MG/DL — HIGH (ref 8–20)
CALCIUM SERPL-MCNC: 8.9 MG/DL — SIGNIFICANT CHANGE UP (ref 8.6–10.2)
CHLORIDE SERPL-SCNC: 89 MMOL/L — LOW (ref 98–107)
CK MB CFR SERPL CALC: 21.2 NG/ML — HIGH (ref 0–6.7)
CK SERPL-CCNC: 1299 U/L — HIGH (ref 25–170)
CO2 SERPL-SCNC: 22 MMOL/L — SIGNIFICANT CHANGE UP (ref 22–29)
COLOR SPEC: YELLOW — SIGNIFICANT CHANGE UP
CREAT SERPL-MCNC: 4.31 MG/DL — HIGH (ref 0.5–1.3)
DIFF PNL FLD: ABNORMAL
EOSINOPHIL # BLD AUTO: 0.1 K/UL — SIGNIFICANT CHANGE UP (ref 0–0.5)
EOSINOPHIL NFR BLD AUTO: 1.5 % — SIGNIFICANT CHANGE UP (ref 0–6)
EPI CELLS # UR: SIGNIFICANT CHANGE UP
GLUCOSE BLDC GLUCOMTR-MCNC: 455 MG/DL — CRITICAL HIGH (ref 70–99)
GLUCOSE BLDC GLUCOMTR-MCNC: 468 MG/DL — CRITICAL HIGH (ref 70–99)
GLUCOSE SERPL-MCNC: 478 MG/DL — HIGH (ref 70–115)
GLUCOSE UR QL: 1000 MG/DL
HCT VFR BLD CALC: 31.8 % — LOW (ref 37–47)
HGB BLD-MCNC: 10.4 G/DL — LOW (ref 12–16)
HYALINE CASTS # UR AUTO: ABNORMAL /LPF
INR BLD: 1.04 RATIO — SIGNIFICANT CHANGE UP (ref 0.88–1.16)
KETONES UR-MCNC: NEGATIVE — SIGNIFICANT CHANGE UP
LEUKOCYTE ESTERASE UR-ACNC: ABNORMAL
LYMPHOCYTES # BLD AUTO: 1 K/UL — SIGNIFICANT CHANGE UP (ref 1–4.8)
LYMPHOCYTES # BLD AUTO: 16.6 % — LOW (ref 20–55)
MAGNESIUM SERPL-MCNC: 2.1 MG/DL — SIGNIFICANT CHANGE UP (ref 1.6–2.6)
MCHC RBC-ENTMCNC: 28.4 PG — SIGNIFICANT CHANGE UP (ref 27–31)
MCHC RBC-ENTMCNC: 32.7 G/DL — SIGNIFICANT CHANGE UP (ref 32–36)
MCV RBC AUTO: 86.9 FL — SIGNIFICANT CHANGE UP (ref 81–99)
MONOCYTES # BLD AUTO: 0.6 K/UL — SIGNIFICANT CHANGE UP (ref 0–0.8)
MONOCYTES NFR BLD AUTO: 9.3 % — SIGNIFICANT CHANGE UP (ref 3–10)
NEUTROPHILS # BLD AUTO: 4.4 K/UL — SIGNIFICANT CHANGE UP (ref 1.8–8)
NEUTROPHILS NFR BLD AUTO: 72.2 % — SIGNIFICANT CHANGE UP (ref 37–73)
NITRITE UR-MCNC: NEGATIVE — SIGNIFICANT CHANGE UP
NT-PROBNP SERPL-SCNC: HIGH PG/ML (ref 0–300)
PH UR: 8 — SIGNIFICANT CHANGE UP (ref 5–8)
PLATELET # BLD AUTO: 249 K/UL — SIGNIFICANT CHANGE UP (ref 150–400)
POTASSIUM SERPL-MCNC: 5.1 MMOL/L — SIGNIFICANT CHANGE UP (ref 3.5–5.3)
POTASSIUM SERPL-SCNC: 5.1 MMOL/L — SIGNIFICANT CHANGE UP (ref 3.5–5.3)
PROT SERPL-MCNC: 6.7 G/DL — SIGNIFICANT CHANGE UP (ref 6.6–8.7)
PROT UR-MCNC: 500 MG/DL
PROTHROM AB SERPL-ACNC: 11.5 SEC — SIGNIFICANT CHANGE UP (ref 9.8–12.7)
RBC # BLD: 3.66 M/UL — LOW (ref 4.4–5.2)
RBC # FLD: 15.2 % — SIGNIFICANT CHANGE UP (ref 11–15.6)
RBC CASTS # UR COMP ASSIST: >50 /HPF (ref 0–4)
SODIUM SERPL-SCNC: 127 MMOL/L — LOW (ref 135–145)
SP GR SPEC: 1.01 — SIGNIFICANT CHANGE UP (ref 1.01–1.02)
TROPONIN T SERPL-MCNC: 0.19 NG/ML — HIGH (ref 0–0.06)
UROBILINOGEN FLD QL: NEGATIVE MG/DL — SIGNIFICANT CHANGE UP
WBC # BLD: 6.2 K/UL — SIGNIFICANT CHANGE UP (ref 4.8–10.8)
WBC # FLD AUTO: 6.2 K/UL — SIGNIFICANT CHANGE UP (ref 4.8–10.8)
WBC UR QL: ABNORMAL

## 2018-05-13 PROCEDURE — 93010 ELECTROCARDIOGRAM REPORT: CPT | Mod: 76

## 2018-05-13 PROCEDURE — 99223 1ST HOSP IP/OBS HIGH 75: CPT | Mod: GC

## 2018-05-13 PROCEDURE — 99284 EMERGENCY DEPT VISIT MOD MDM: CPT

## 2018-05-13 PROCEDURE — 71046 X-RAY EXAM CHEST 2 VIEWS: CPT | Mod: 26

## 2018-05-13 PROCEDURE — 93970 EXTREMITY STUDY: CPT | Mod: 26

## 2018-05-13 PROCEDURE — 99255 IP/OBS CONSLTJ NEW/EST HI 80: CPT

## 2018-05-13 RX ORDER — SODIUM CHLORIDE 9 MG/ML
1000 INJECTION, SOLUTION INTRAVENOUS
Qty: 0 | Refills: 0 | Status: DISCONTINUED | OUTPATIENT
Start: 2018-05-13 | End: 2018-05-16

## 2018-05-13 RX ORDER — FERROUS SULFATE 325(65) MG
325 TABLET ORAL DAILY
Qty: 0 | Refills: 0 | Status: DISCONTINUED | OUTPATIENT
Start: 2018-05-13 | End: 2018-05-16

## 2018-05-13 RX ORDER — ENOXAPARIN SODIUM 100 MG/ML
30 INJECTION SUBCUTANEOUS DAILY
Qty: 0 | Refills: 0 | Status: DISCONTINUED | OUTPATIENT
Start: 2018-05-13 | End: 2018-05-16

## 2018-05-13 RX ORDER — DEXTROSE 50 % IN WATER 50 %
25 SYRINGE (ML) INTRAVENOUS ONCE
Qty: 0 | Refills: 0 | Status: DISCONTINUED | OUTPATIENT
Start: 2018-05-13 | End: 2018-05-16

## 2018-05-13 RX ORDER — SENNA PLUS 8.6 MG/1
2 TABLET ORAL AT BEDTIME
Qty: 0 | Refills: 0 | Status: DISCONTINUED | OUTPATIENT
Start: 2018-05-13 | End: 2018-05-16

## 2018-05-13 RX ORDER — CARVEDILOL PHOSPHATE 80 MG/1
12.5 CAPSULE, EXTENDED RELEASE ORAL EVERY 12 HOURS
Qty: 0 | Refills: 0 | Status: DISCONTINUED | OUTPATIENT
Start: 2018-05-13 | End: 2018-05-14

## 2018-05-13 RX ORDER — ASPIRIN/CALCIUM CARB/MAGNESIUM 324 MG
81 TABLET ORAL DAILY
Qty: 0 | Refills: 0 | Status: DISCONTINUED | OUTPATIENT
Start: 2018-05-13 | End: 2018-05-16

## 2018-05-13 RX ORDER — HYDRALAZINE HCL 50 MG
50 TABLET ORAL EVERY 8 HOURS
Qty: 0 | Refills: 0 | Status: DISCONTINUED | OUTPATIENT
Start: 2018-05-13 | End: 2018-05-14

## 2018-05-13 RX ORDER — SIMVASTATIN 20 MG/1
40 TABLET, FILM COATED ORAL AT BEDTIME
Qty: 0 | Refills: 0 | Status: DISCONTINUED | OUTPATIENT
Start: 2018-05-13 | End: 2018-05-16

## 2018-05-13 RX ORDER — DEXTROSE 50 % IN WATER 50 %
15 SYRINGE (ML) INTRAVENOUS ONCE
Qty: 0 | Refills: 0 | Status: DISCONTINUED | OUTPATIENT
Start: 2018-05-13 | End: 2018-05-16

## 2018-05-13 RX ORDER — INSULIN LISPRO 100/ML
VIAL (ML) SUBCUTANEOUS
Qty: 0 | Refills: 0 | Status: DISCONTINUED | OUTPATIENT
Start: 2018-05-13 | End: 2018-05-13

## 2018-05-13 RX ORDER — NITROGLYCERIN 6.5 MG
1 CAPSULE, EXTENDED RELEASE ORAL ONCE
Qty: 0 | Refills: 0 | Status: COMPLETED | OUTPATIENT
Start: 2018-05-13 | End: 2018-05-13

## 2018-05-13 RX ORDER — AMLODIPINE BESYLATE 2.5 MG/1
5 TABLET ORAL DAILY
Qty: 0 | Refills: 0 | Status: DISCONTINUED | OUTPATIENT
Start: 2018-05-13 | End: 2018-05-14

## 2018-05-13 RX ORDER — DOCUSATE SODIUM 100 MG
100 CAPSULE ORAL DAILY
Qty: 0 | Refills: 0 | Status: DISCONTINUED | OUTPATIENT
Start: 2018-05-13 | End: 2018-05-16

## 2018-05-13 RX ORDER — HYDROCHLOROTHIAZIDE 25 MG
25 TABLET ORAL DAILY
Qty: 0 | Refills: 0 | Status: DISCONTINUED | OUTPATIENT
Start: 2018-05-13 | End: 2018-05-14

## 2018-05-13 RX ORDER — GLUCAGON INJECTION, SOLUTION 0.5 MG/.1ML
1 INJECTION, SOLUTION SUBCUTANEOUS ONCE
Qty: 0 | Refills: 0 | Status: DISCONTINUED | OUTPATIENT
Start: 2018-05-13 | End: 2018-05-16

## 2018-05-13 RX ORDER — DEXTROSE 50 % IN WATER 50 %
12.5 SYRINGE (ML) INTRAVENOUS ONCE
Qty: 0 | Refills: 0 | Status: DISCONTINUED | OUTPATIENT
Start: 2018-05-13 | End: 2018-05-16

## 2018-05-13 RX ORDER — INSULIN GLARGINE 100 [IU]/ML
10 INJECTION, SOLUTION SUBCUTANEOUS AT BEDTIME
Qty: 0 | Refills: 0 | Status: DISCONTINUED | OUTPATIENT
Start: 2018-05-13 | End: 2018-05-14

## 2018-05-13 RX ORDER — LOSARTAN POTASSIUM 100 MG/1
100 TABLET, FILM COATED ORAL DAILY
Qty: 0 | Refills: 0 | Status: DISCONTINUED | OUTPATIENT
Start: 2018-05-13 | End: 2018-05-14

## 2018-05-13 RX ORDER — INSULIN LISPRO 100/ML
VIAL (ML) SUBCUTANEOUS
Qty: 0 | Refills: 0 | Status: DISCONTINUED | OUTPATIENT
Start: 2018-05-13 | End: 2018-05-16

## 2018-05-13 RX ORDER — ACETAMINOPHEN 500 MG
650 TABLET ORAL EVERY 6 HOURS
Qty: 0 | Refills: 0 | Status: DISCONTINUED | OUTPATIENT
Start: 2018-05-13 | End: 2018-05-16

## 2018-05-13 RX ORDER — ASPIRIN/CALCIUM CARB/MAGNESIUM 324 MG
325 TABLET ORAL ONCE
Qty: 0 | Refills: 0 | Status: COMPLETED | OUTPATIENT
Start: 2018-05-13 | End: 2018-05-13

## 2018-05-13 RX ORDER — CHOLECALCIFEROL (VITAMIN D3) 125 MCG
1 CAPSULE ORAL
Qty: 0 | Refills: 0 | COMMUNITY

## 2018-05-13 RX ADMIN — Medication 1 INCH(S): at 18:58

## 2018-05-13 RX ADMIN — Medication 650 MILLIGRAM(S): at 23:10

## 2018-05-13 RX ADMIN — Medication 650 MILLIGRAM(S): at 22:40

## 2018-05-13 RX ADMIN — Medication 12: at 22:39

## 2018-05-13 RX ADMIN — ENOXAPARIN SODIUM 30 MILLIGRAM(S): 100 INJECTION SUBCUTANEOUS at 22:40

## 2018-05-13 RX ADMIN — Medication 325 MILLIGRAM(S): at 18:59

## 2018-05-13 RX ADMIN — INSULIN GLARGINE 10 UNIT(S): 100 INJECTION, SOLUTION SUBCUTANEOUS at 22:40

## 2018-05-13 NOTE — H&P ADULT - PMH
DM (diabetes mellitus)    HLD (hyperlipidemia)    HTN (hypertension) DM (diabetes mellitus)    End stage renal disease on dialysis    HLD (hyperlipidemia)    HTN (hypertension)    Pericardial effusion    Pleural effusion

## 2018-05-13 NOTE — H&P ADULT - NSHPSOCIALHISTORY_GEN_ALL_CORE
Denies smoking, alcohol, illicit drugs   Ambulates on own at home  Last colonoscopy: 2017 WNL (as per chart 2016)  Last EGD: 2017 WNL as per patient Denies smoking, alcohol, illicit drugs   Ambulates on own at home  Last colonoscopy: 2017 WNL (as per chart 2016)

## 2018-05-13 NOTE — ED ADULT NURSE REASSESSMENT NOTE - NS ED NURSE REASSESS COMMENT FT1
Terence Stafford @ bedside for translation for medication review. Senior Resident Dr. Hoffmann @ bedside assesing pt. Pt aware of the plan of care. Pt correction- pain radiates from left shoulder to chest and left fistula. Warm pack provided.

## 2018-05-13 NOTE — ED ADULT NURSE REASSESSMENT NOTE - NS ED NURSE REASSESS COMMENT FT1
Assumed pt care @ 1900 from EARNEST Doran. Pt is A&Ox3 in NAD, NSR in the 70s.  IV clean dry and intact, flushes without difficulty. PT with +bruit +thrill to LUE fistula. Right chest wall PermCath. Safety maintained, Bed locked in lowest position. Pt and family aware of the plan of care. Pt leaving for US at this time.

## 2018-05-13 NOTE — H&P ADULT - NSHPPHYSICALEXAM_GEN_ALL_CORE
Vital Signs Last 24 Hrs  T(C): 37 (13 Mar 2018 23:05), Max: 37 (13 Mar 2018 23:05)  T(F): 98.6 (13 Mar 2018 23:05), Max: 98.6 (13 Mar 2018 23:05)  HR: 82 (13 Mar 2018 23:05) (82 - 90)  BP: 165/83 (13 Mar 2018 23:05) (143/83 - 165/83)  BP(mean): --  RR: 18 (13 Mar 2018 23:05) (18 - 18)  SpO2: 98% (13 Mar 2018 19:25) (98% - 98%)    PHYSICAL EXAM:      Constitutional: NAD, laying in bed, pale-appearing  HEENT: PERRLA, EOMI ; conjunctival pallor  Neck: No JVD, supple  Back: Normal spine flexure, No CVA tenderness  Respiratory: +inspiratory and expiratory crackles in lower lobes b/l, R>L, decreased breath sounds at bases ; no wheezing, rhonchi  Cardiovascular: S1 and S2, RRR, no m/r/g; Chest pain reproducible to palpation across sternum in mid-sternal region extending to left parasternal region, 2nd -4th costal cartilages  Gastrointestinal: BS+ normoactive, soft, ND, NTTP   Extremities: +2 pitting edema b/l up to knees  Vascular: 2+ peripheral pulses; cap refill >2 sec, pallor to extremities and nailbeds  Neurological: AAO x 3, no focal deficits  Psychiatric: Normal mood, normal affect  Musculoskeletal: 5/5 strength b/l upper and lower extremities  Skin: No rashes, no diaphoresis Vital Signs Last 24 Hrs  T(C): 36.4 (13 May 2018 20:05), Max: 36.4 (13 May 2018 17:36)  T(F): 97.5 (13 May 2018 20:05), Max: 97.5 (13 May 2018 17:36)  HR: 71 (13 May 2018 21:38) (70 - 74)  BP: 135/80 (13 May 2018 21:38) (131/83 - 135/80)  RR: 16 (13 May 2018 21:38) (16 - 18)  SpO2: 98% (13 May 2018 21:38) (98% - 99%)    PHYSICAL EXAM:  Constitutional: NAD, laying in bed, pale-appearing  HEENT: PERRLA, EOMI ; conjunctival pallor  Neck: No JVD, supple  Back: Normal spine flexure, No CVA tenderness  Respiratory: +inspiratory and expiratory crackles in lower lobes b/l, no wheezing, rhonchi  Cardiovascular: S1 and S2, RRR, no m/r/g; No current chest pain  Gastrointestinal: BS+ normoactive, soft, ND, NTTP   Extremities: FROM, no edema, calf pain  Vascular: 2+ peripheral pulses; cap refill >2 sec, pallor to extremities and nailbeds  Neurological: AAO x 3, no focal deficits  Psychiatric: Normal mood, normal affect  Musculoskeletal: 5/5 strength b/l upper and lower extremities  Skin: No rashes, no diaphoresis Constitutional: NAD, laying in bed, pale-appearing  HEENT: PERRLA, EOMI ; conjunctival pallor  Neck: No JVD, supple  Respiratory: +inspiratory and expiratory crackles in lower lobes b/l, no wheezing, rhonchi  Cardiovascular: S1 and S2, RRR, no m/r/g; No current chest pain  Gastrointestinal: BS+ normoactive, soft, ND, NTTP   Extremities: FROM, no edema, calf pain  Vascular: 2+ peripheral pulses; cap refill >2 sec, pallor to extremities and nailbeds  Neurological: AAO x 3, no focal deficits  Musculoskeletal: 5/5 strength b/l upper and lower extremities Vital Signs Last 24 Hrs  T(C): 36.4 (13 May 2018 20:05), Max: 36.4 (13 May 2018 17:36)  T(F): 97.5 (13 May 2018 20:05), Max: 97.5 (13 May 2018 17:36)  HR: 71 (13 May 2018 21:38) (70 - 74)  BP: 135/80 (13 May 2018 21:38) (131/83 - 135/80)  RR: 16 (13 May 2018 21:38) (16 - 18)  SpO2: 98% (13 May 2018 21:38) (98% - 99%)    Constitutional: NAD, laying in bed, pale-appearing  HEENT: PERRLA, EOMI ; conjunctival pallor  Neck: No JVD, supple  Respiratory: +inspiratory and expiratory crackles in lower lobes b/l, no wheezing, rhonchi  Cardiovascular: S1 and S2, RRR, no m/r/g; No current chest pain  Gastrointestinal: BS+ normoactive, soft, ND, NTTP   Extremities: FROM, no edema, calf pain, AV fistula noted on LUE  Vascular: 2+ peripheral pulses; cap refill >2 sec, pallor to extremities and nailbeds  Neurological: AAO x 3, no focal deficits  Musculoskeletal: 5/5 strength b/l upper and lower extremities Constitutional: NAD, laying in bed, pale-appearing  HEENT: PERRLA, EOMI ; conjunctival pallor  Neck: No JVD, supple  Respiratory: +inspiratory and expiratory crackles in lower lobes b/l, no wheezing, rhonchi  Cardiovascular: S1 and S2, RRR, no m/r/g; No current chest pain  Gastrointestinal: BS+ normoactive, soft, ND, NTTP   Extremities: FROM, no edema, calf pain, AV fistula noted on LUE  Vascular: 2+ peripheral pulses; cap refill >2 sec, pallor to extremities and nailbeds  Neurological: AAO x 3, no focal deficits  Musculoskeletal: 5/5 strength b/l upper and lower extremities

## 2018-05-13 NOTE — CONSULT NOTE ADULT - SUBJECTIVE AND OBJECTIVE BOX
Consult requested by: Dr Contreras	     Reason for Consultation: Chest pain    History obtained by: Patient and medical record     obtained: Yes    Chief complaint: Chest pain    HPI:  Ms. Elizabeth is a 57yo F with PMH HTN, HLD, DMII on insulin, ESRD on HD M/W/F, hyponatremia with recent admission -April 3 for vomiting and stomach pain who presents with 3 days of intermittent chest pain and SOB. The pain refers to the L shoulder and upper back. She had the pain the day of admissions and two days before. This pain is different and less than previous pain it is 6-7/10 at it's worst and there is no pain at the time of the interview. The pain lasts about 1/2 hr. She says that the pain is relieved when she raises her L hand.   Of note, patient had recent negative stress test in January and in March. She was dx with H Pylori at the previous admission and just finished treatment. She had left on coumadin for AFib, but her hemeonc took her off. Perhaps there was confusion as to whether the coumadin was for the superficial UE thrombosis or for AFib. Her cardiologist is proceeding with planning for possible Watchman procedure.  She denies abdominal pain, N/V/D/C, lower extremity swelling, fever, chills. (13 May 2018 22:01)  Above reviewed and noted: I saw and examined this patient in the ED with the nurse and  at bed side, she is in NAD and denies any complaints at this time. The patient admits to chest pain D/A "Strong" not able to rate scale or type, the pain was located in the left arm, neck, and chest A/W SOB, chills and fatigue. Patient denies any alleviating aggravating factors to me. She denies N/V/D, poor diet, missing any dialysis sessions, weight gain.    REVIEW OF SYSTEMS:  CONSTITUTIONAL: No fever, weight loss, or fatigue  EYES: No eye pain, visual disturbances, or discharge  ENMT:  No difficulty hearing, tinnitus, vertigo; No sinus or throat pain  NECK: No pain or stiffness  RESPIRATORY: No cough, wheezing, chills or hemoptysis; + shortness of breath  CARDIOVASCULAR: + chest pain, No palpitations, dizziness, or leg swelling  GASTROINTESTINAL: No abdominal or epigastric pain. No nausea, vomiting, or hematemesis; No diarrhea or constipation. No melena or hematochezia.  GENITOURINARY: No dysuria, frequency, hematuria, or incontinence  NEUROLOGICAL: No headaches, memory loss, loss of strength, numbness, or tremors  SKIN: No itching, burning, rashes, or lesions   LYMPH NODES: No enlarged glands  ENDOCRINE: No heat or cold intolerance; No hair loss  MUSCULOSKELETAL: No joint pain or swelling; No muscle, back, or extremity pain  PSYCHIATRIC: No depression, anxiety, mood swings, or difficulty sleeping  HEME/LYMPH: No easy bruising, or bleeding gums  ALLERY AND IMMUNOLOGIC: No hives or eczema      MEDICATIONS  (STANDING):  amLODIPine   Tablet 5 milliGRAM(s) Oral daily  aspirin enteric coated 81 milliGRAM(s) Oral daily  carvedilol 12.5 milliGRAM(s) Oral every 12 hours  cloNIDine 0.3 milliGRAM(s) Oral every 12 hours  dextrose 5%. 1000 milliLiter(s) (50 mL/Hr) IV Continuous <Continuous>  dextrose 50% Injectable 12.5 Gram(s) IV Push once  dextrose 50% Injectable 25 Gram(s) IV Push once  dextrose 50% Injectable 25 Gram(s) IV Push once  enoxaparin Injectable 30 milliGRAM(s) SubCutaneous daily  ferrous    sulfate 325 milliGRAM(s) Oral daily  hydrALAZINE 50 milliGRAM(s) Oral every 8 hours  hydrochlorothiazide 25 milliGRAM(s) Oral daily  insulin glargine Injectable (LANTUS) 10 Unit(s) SubCutaneous at bedtime  insulin lispro (HumaLOG) corrective regimen sliding scale   SubCutaneous three times a day before meals  losartan 100 milliGRAM(s) Oral daily  simvastatin 40 milliGRAM(s) Oral at bedtime    MEDICATIONS  (PRN):  acetaminophen   Tablet. 650 milliGRAM(s) Oral every 6 hours PRN Mild Pain (1 - 3)  dextrose 40% Gel 15 Gram(s) Oral once PRN Blood Glucose LESS THAN 70 milliGRAM(s)/deciliter  docusate sodium 100 milliGRAM(s) Oral daily PRN for constipation  glucagon  Injectable 1 milliGRAM(s) IntraMuscular once PRN Glucose LESS THAN 70 milligrams/deciliter  senna 2 Tablet(s) Oral at bedtime PRN Constipation        PAST MEDICAL & SURGICAL HISTORY:  HLD (hyperlipidemia)  HTN (hypertension)  DM (diabetes mellitus)  ESRD on HD  Dialysis port Right chest wall, Shunt left arm    FAMILY HISTORY:  No pertinent family history in first degree relatives      SOCIAL HISTORY:  CIGARETTES: no   ALCOHOL: no  DRUGS: No    Vital Signs Last 24 Hrs  T(C): 36.4 (13 May 2018 20:05), Max: 36.4 (13 May 2018 17:36)  T(F): 97.5 (13 May 2018 20:05), Max: 97.5 (13 May 2018 17:36)  HR: 71 (13 May 2018 21:38) (70 - 74)  BP: 135/80 (13 May 2018 21:38) (131/83 - 135/80)  BP(mean): --  RR: 16 (13 May 2018 21:38) (16 - 18)  SpO2: 98% (13 May 2018 21:38) (98% - 99%)      PHYSICAL EXAM:  GENERAL: NAD, well-groomed, well-developed  HEAD:  Atraumatic, Normocephalic  EYES: EOMI, PERRLA, conjunctiva and sclera clear  ENMT: No tonsillar erythema, exudates, or enlargement; Moist mucous membranes, No lesions  NECK: Supple, No JVD, Normal thyroid  NERVOUS SYSTEM:  Alert & Oriented X3, Good concentration; Motor Strength 5/5 B/L upper and lower extremities  CHEST/LUNG: right chest wall dialysis catheter implanted, Lungs Clear to percussion bilaterally; No rales, rhonchi, wheezing, or rubs  HEART: Regular rate and rhythm; No murmurs, rubs, or gallops  ABDOMEN: Soft, Nontender, Nondistended; Bowel sounds present  EXTREMITIES:  2+ Peripheral Pulses, No clubbing, cyanosis, or edema, LUE dialysis shunt   LYMPH: No lymphadenopathy noted  SKIN: No rashes or lesions    INTERPRETATION OF TELEMETRY: sinus rate controlled     ECG: Sinus 74 T wave inversion III, V1, V3, V4 other non-specific T wave changes     I&O's Detail      LABS:                      10.4   6.2   )-----------( 249      ( 13 May 2018 18:51 )             31.8     05-13    127<L>  |  89<L>  |  47.0<H>  ----------------------------<  478<H>  5.1   |  22.0  |  4.31<H>    Ca    8.9      13 May 2018 18:51  Mg     2.1     05-13    TPro  6.7  /  Alb  3.0<L>  /  TBili  0.3<L>  /  DBili  x   /  AST  52<H>  /  ALT  18  /  AlkPhos  88  05-13    CARDIAC MARKERS ( 13 May 2018 18:51 )  Trop/ 0.19 ng/mL   CK: 1299 U/L  CKMB: 21.2 ng/mL  Pro BNP 63489    PT/INR - ( 13 May 2018 18:51 )   PT: 11.5 sec;   INR: 1.04 ratio         PTT - ( 13 May 2018 18:51 )  PTT:31.5 sec  Urinalysis Basic - ( 13 May 2018 21:43 )    Color: Yellow / Appearance: Slightly Turbid / S.010 / pH: x  Gluc: x / Ketone: Negative  / Bili: Negative / Urobili: Negative mg/dL   Blood: x / Protein: 500 mg/dL / Nitrite: Negative   Leuk Esterase: Trace / RBC: >50 /HPF / WBC 6-10   Sq Epi: x / Non Sq Epi: Occasional / Bacteria: Occasional    I&O's Summary    RADIOLOGY & ADDITIONAL STUDIES:  X-ray:  Cardiomegalia, right pleural effusion     PREVIOUS DIAGNOSTIC TESTING:    ECHO  Summary:   1. Left ventricular ejection fraction, by visual estimation, is 60 to   65%.   2. Normal global left ventricular systolic function.   3. Spectral Doppler shows impaired relaxation pattern of left   ventricular myocardial filling (Grade I diastolic dysfunction).   4. Normal right ventricular size and function.   5. Moderate pericardial effusion.   6. Moderate pleural effusion in both left and right lateral regions.   7. Mild mitral valve regurgitation.   8. Sclerotic aortic valve with normal opening.   9. Questionable llambl's visualized on the aortic valve.  10. Mildly dilated pulmonary artery.  [id number] Elio Hernandes MD, MD Electronically signed on 3/14/2018 at   9:55:28 AM      CATHETERIZATION  ENTRICLES: There were no left ventricular global or regional wall motion  abnormalities. Global left ventricular function was normal. EF calculated  by contrast ventriculography was 55 %.  VALVES: AORTIC VALVE: No significant aortic valve gradient. MITRAL VALVE:  The mitral valve exhibited trivial regurgitation (less than 1+).  CORONARY VESSELS: The coronary circulation is right dominant.  LM:   --  LM: Normal. The vessel was normal sized, not calcified, and not  tortuous. Angiography showed no evidence of disease.  LAD:   --  LAD: Normal. The vessel was normal sized, not calcified, and not  tortuous. Angiography showed minor luminal irregularities with no flow  limiting lesions.  CX:   --  Circumflex: Normal. The vessel was normal sized, not calcified,  and excessively tortuous. Angiography showed no evidence of disease.  RCA:   --  RCA: Normal. The vessel was normal sized, not calcified, and  moderately tortuous. Angiography showed no evidence of disease.  COMPLICATIONS: There were no complications. No complications occurred  during the cath lab visit.  DIAGNOSTIC IMPRESSIONS: There is mild irregularity of the coronary anatomy.  Left ventricular function is normal (LVEF=55%).  DIAGNOSTIC RECOMMENDATIONS: The patient should continue with the present  medications. Patient management should include aggressive medical therapy  and resumption of all previous activities in 2 days.  Prepared and signed by  Jesús Godinez MD  Signed 2018 14:30:17

## 2018-05-13 NOTE — ED PROVIDER NOTE - NS ED MD TWO NIGHTS YN
Vandana GARRISON: left sided PTX, Cardiothoracic placed chest tube, patient admitted for inpatient observation.
Yes

## 2018-05-13 NOTE — H&P ADULT - PROBLEM SELECTOR PLAN 2
Pleuritic chest pain worsened after vomiting, reproducible to palpation on exam likely component of costochondritis in the setting of possible pneumonia  -Tylenol PRN pain  -EKG NSR with nonspecific T wave inversion  -troponin 0.16 x 1, will f/up repeat at 3am  -no tachycardia at this time  -recent outpatient stress test WNL in January 2018  -f/up cardio consult in AM - Chronic DMII poorly controlled with insulin   - Lantus 10units at bedtime  - POCT  - Moderate Sliding Scale  - A1c pending for AM

## 2018-05-13 NOTE — H&P ADULT - ASSESSMENT
58 yo F with PMH s/f HTN/HLD, DMII, hyperkalemia who presents with 3 weeks of postprandial vomiting and stomach pain, symptomatic anemia and concern for GI bleed with recent ibuprofen use, with tactile fever and productive cough suggestive of pneumonia, found to have Right lung infiltrate on CXR, with acute on chronic renal insufficiency and dehydration, currently vitally stable    Admit to: inpatient level of care  Service: medicine resident  Attending: Dr. Bartholomew/Dr. Agee  Bed type: Monitored +  Ms. Elizabeth is a 57yo F with PMH HTN, HLD, DMII on insulin, ESRD on HD M/W/F, hyponatremia with recent admission March 29-April 3 for vomiting and stomach pain admitted for chest pain.    Admit to Medicine/Resident Service Telemetry under Dr. Contreras/Katerine  Consistent Carbohydrates Renal Diet  Vital per routine  Ambulate as tolerated Ms. Elizabeth is a 57yo F with PMH HTN, HLD, DMII on insulin, ESRD on HD M/W/F, hyponatremia with recent admission March 29-April 3 for vomiting and stomach pain admitted for chest pain.    Admit to Medicine/Resident Service Telemetry under Dr. Contreras/Katerine  Consistent Carbohydrates Renal Diet  Vital per routine  Ambulate as tolerated    Chest pain.    - Atypical  - Pain in arm may be related to AV fistula  - Telemetry  - Tylenol PRN pain  - EKG NSR with T wave abnormailities  - Nitro patch applied  - chronic elevated troponin 0.19 x 1, will f/up repeat at 1am  - recent outpatient stress test WNL in January 2018  - recent cath was negative EF 55% March 2018  - TTE in AM  - F/U cardio consult.    Hyperglycemia due to type 2 diabetes mellitus.    - Chronic DMII poorly controlled with insulin   - Lantus 10units at bedtime  - POCT  - Moderate Sliding Scale  - A1c pending for AM.     Hyponatremia.    - Chronic.     Lung infiltrate.    - CXR shows improvement in L lower lobe vs March film.     ESRD on hemodialysis.    - M/W/F. Pt went two days prior to admission  - Renal Diet  - C/W Epogen.    Anemia due to chronic kidney disease.   - Improved Hb 10.4 vs 8.4 6weeks earlier  - Renal will continue with Epogen.    Elevated troponin level.    - Troponemia likely in the setting of CKD   - troponin 0.19 about equivalent to March values  - serial trops x 2 more.     Hypertension.   - C/W home meds w/parameters  - Amlodipine 5mg daily  - Clonidine 0.3mg Q12h  - Carvedilol 12.5 Q12h  - Hydralazine 50mg Q8h  - Hydrochlorothizide 25mg daily  - Losartan 100mg daily.     Need for prophylactic measure.    - Lovenox renally dosed. Ms. Elizabeth is a 59yo F with PMH HTN, HLD, DMII on insulin, ESRD on HD M/W/F, hyponatremia with recent admission March 29-April 3 for vomiting and stomach pain admitted for chest pain.    Admit to Medicine/Resident Service Telemetry under Dr. Contreras/Katerine  Consistent Carbohydrates Renal Diet  Vital per routine  Ambulate as tolerated    Chest pain.    - Atypical  - Pain in arm may be related to AV fistula  - Telemetry  - Tylenol PRN pain  - EKG NSR with T wave abnormailities  - Nitro patch applied  - chronic elevated troponin 0.19 x 1, will f/up repeat at 1am  - recent outpatient stress test WNL in January 2018  - recent cath was negative EF 55% March 2018  - TTE in AM  - F/U cardio consult.    Hyperglycemia due to type 2 diabetes mellitus.    - Chronic DMII poorly controlled with insulin   - Lantus 10units at bedtime (Pt on 5 units at home)  - POCT  - Moderate Sliding Scale  - A1c pending for AM.     Hyponatremia.    - Chronic.     Lung infusion.    - CXR shows improvement in L lower lobe vs March film.     ESRD on hemodialysis.    - M/W/F. Pt went two days prior to admission  - Renal Diet  - C/W Epogen.  - US pending AV fistula  - Renal Called    Anemia due to chronic kidney disease.   - Improved Hb 10.4 vs 8.4 6weeks earlier  - Renal will continue with Epogen.    Elevated troponin level.    - Troponemia likely in the setting of CKD   - troponin 0.19 about equivalent to March values  - serial trops x 2 more.     Hypertension.   - C/W home meds w/parameters  - Amlodipine 5mg daily  - Clonidine 0.3mg Q12h  - Carvedilol 12.5 Q12h  - Hydralazine 50mg Q8h  - Hydrochlorothizide 25mg daily  - Losartan 100mg daily.     Need for prophylactic measure.    - Lovenox renally dosed. Ms. Elizabeth is a 57yo F with PMH HTN, HLD, DMII not on insulin at home, ESRD on HD M/W/F, hyponatremia with recent admission March 29-April 3 for vomiting and stomach pain admitted for chest pain.    Admit to Medicine/Resident Service Telemetry under Dr. Contreras/Katerine  Consistent Carbohydrates Renal Diet  Vital per routine  Ambulate as tolerated    1.Chest pain likely atypical in nature, r/o acs. Elevated Trop noted at 0.19 with prior admissions troponins noted at 0.14-.16. Given recent negative cath and EF of 55% along with Stress that is recent negative (Jan 2018), will admit to tele and monitor for arrhythmia     -there could be a MSK component with pain from AVF placement  -EKG changes are new with prolonged QTc noted  -nitro patch applied in ER.  -BP medications resumed  -aspirin, statin  -serial enzymes ordered, repeat stat trop/ck and again in am   - TTE in AM  - F/U cardio consult noted with concern for acute heart failure however, ProBNP can be elevated due to dialysis, patient does not appear to be fluid overloaded       2)Hyperglycemia due to type 2 diabetes mellitus-recently admitted and on insulin, kept on oral regimen on discharge    - Chronic DMII poorly controlled with insulin   - Lantus 10units at bedtime (Pt on 5 units at home)  - POCT  - Moderate Sliding Scale  - A1c pending for AM.     Hyponatremia.    - Chronic.     Lung infusion.    - CXR shows improvement in L lower lobe vs March film.     ESRD on hemodialysis.    - M/W/F. Pt went two days prior to admission  - Renal Diet  - C/W Epogen.  - US pending AV fistula  - Renal Called    Anemia due to chronic kidney disease.   - Improved Hb 10.4 vs 8.4 6weeks earlier  - Renal will continue with Epogen.    Elevated troponin level.    - Troponemia likely in the setting of CKD   - troponin 0.19 about equivalent to March values  - serial trops x 2 more.     Hypertension.   - C/W home meds w/parameters  - Amlodipine 5mg daily  - Clonidine 0.3mg Q12h  - Carvedilol 12.5 Q12h  - Hydralazine 50mg Q8h  - Hydrochlorothizide 25mg daily  - Losartan 100mg daily.     Need for prophylactic measure.    - Lovenox renally dosed. Ms. Elizabeth is a 59yo F with PMH HTN, HLD, DMII not on insulin at home, ESRD on HD M/W/F, hyponatremia with recent admission March 29-April 3 for vomiting and stomach pain admitted for chest pain.    Admit to Medicine/Resident Service Telemetry under Dr. Contreras/Katerine  Consistent Carbohydrates Renal Diet  Vital per routine  Ambulate as tolerated    1.Chest pain likely atypical in nature, r/o acs. Elevated Trop noted at 0.19 with prior admissions troponins noted at 0.14-.16. Given recent negative cath and EF of 55% along with Stress that is recent negative (Jan 2018), will admit to tele and monitor for arrhythmia     -there could be a MSK component with pain from AVF placement  -EKG changes are new with prolonged QTc noted  -nitro patch applied in ER.  -BP medications resumed  -aspirin, statin  -serial enzymes ordered, repeat stat trop/ck and again in am   - TTE in AM  - F/U cardio consult noted with concern for acute heart failure however, ProBNP can be elevated due to dialysis, patient does not appear to be fluid overloaded       2)Hyperglycemia due to type 2 diabetes mellitus-recently admitted and on insulin, kept on oral regimen on discharge with sliding scale only. With presenting hyponatremia which is likely worsened with recently elevated c/s   - Chronic DMII poorly controlled with insulin   - Lantus 10units at bedtime (patient is on sliding scale only at home based on last admission); no AG, however, will monitor closely. Patient just received 10units overnight and 12 units of coverage   - POCT  - Moderate Sliding Scale  - A1c pending for AM, recent one noted at 7.5     3)Lung effusion chronic since prior admission with routine dialysis    - CXR shows improvement in L lower lobe vs March film.   -no e/o SOB on exam     4)ESRD on hemodialysis.    - M/W/F. Pt went two days prior to admission  - Renal Diet  - C/W Epogen.  - US pending AV fistula evaluation to see if thrombus formation given that she was taken off of coumadin recently   - Renal Called    5)Anemia due to chronic kidney disease.   - Improved Hb 10.4 vs 8.4 6weeks earlier  - Renal will continue with Epogen.      6)Hypertension.   - C/W home meds w/parameters  - Amlodipine 5mg daily  - Clonidine 0.3mg Q12h  - Carvedilol 12.5 Q12h  - Hydralazine 50mg Q8h  - Hydrochlorothizide 25mg daily  - Losartan 100mg daily.     7)Need for prophylactic measure.    - Lovenox renally dosed.     8) Dispo - pending cardiac w/up   Patient spoken to with  Ms. Elizabeth is a 59yo F with PMH HTN, HLD, DMII not on insulin at home, ESRD on HD M/W/F, hyponatremia with recent admission March 29-April 3 for vomiting and stomach pain admitted for chest pain.    Admit to Medicine/Resident Service Telemetry under Dr. Contreras/Katerine  Consistent Carbohydrates Renal Diet  Vital per routine  Ambulate as tolerated    1.Chest pain likely atypical in nature, r/o acs. Elevated Trop noted at 0.19 with prior admissions troponins noted at 0.14-.16. Given recent negative cath and EF of 55% along with Stress that is recent negative (Jan 2018), will admit to tele and monitor for arrhythmia     -there could be a MSK component with pain from AVF placement  -EKG changes are new with prolonged QTc noted  -nitro patch applied in ER.  -BP medications resumed  -aspirin, statin  -serial enzymes ordered, repeat stat trop/ck and again in am   - TTE in AM  - F/U cardio consult noted with concern for acute heart failure however, ProBNP can be elevated due to dialysis, patient does not appear to be fluid overloaded       2)Hyperglycemia due to type 2 diabetes mellitus-recently admitted and on insulin, kept on oral regimen on discharge with sliding scale only. With presenting hyponatremia which is likely worsened with recently elevated c/s   - Chronic DMII poorly controlled with insulin   - Lantus 10units at bedtime (patient is on sliding scale only at home based on last admission); no AG, however, will monitor closely. Patient just received 10units overnight and 12 units of coverage   - POCT  - Moderate Sliding Scale  - A1c pending for AM, recent one noted at 7.5     3)Lung effusion chronic since prior admission with routine dialysis    - CXR shows improvement in L lower lobe vs March film.   -no e/o SOB on exam     4) Abnormal TFTs - noted on last admission. Elev TSH with normal T4 and Low T3. Cannot definitely say she is subclinical. Will repeat tfts and consider adding a low dose of synthroid given patients overall status     5)ESRD on hemodialysis.    - M/W/F. Pt went two days prior to admission  - Renal Diet  - C/W Epogen.  - US pending AV fistula evaluation to see if thrombus formation given that she was taken off of coumadin recently   - Renal Called    6)Anemia due to chronic kidney disease.   - Improved Hb 10.4 vs 8.4 6weeks earlier  - Renal will continue with Epogen.      7)Hypertension.   - C/W home meds w/parameters  - Amlodipine 5mg daily  - Clonidine 0.3mg Q12h  - Carvedilol 12.5 Q12h  - Hydralazine 50mg Q8h  - Hydrochlorothizide 25mg daily  - Losartan 100mg daily.     8)Need for prophylactic measure.    - Lovenox renally dosed.     9) Dispo - pending cardiac w/up   Patient spoken to with  Ms. Elizabeth is a 59yo F with PMH HTN, HLD, DMII not on insulin at home, ESRD on HD M/W/F, hyponatremia with recent admission March 29-April 3 for vomiting and stomach pain admitted for chest pain.    Admit to Medicine/Resident Service Telemetry under Dr. Contreras/Katerine  Consistent Carbohydrates Renal Diet  Vital per routine  Ambulate as tolerated    1.Chest pain likely atypical in nature, r/o acs. Elevated Trop noted at 0.19 with prior admissions troponins noted at 0.14-.16. Given recent negative cath and EF of 55% along with Stress that is recent negative (Jan 2018), will admit to tele and monitor for arrhythmia     -there could be a MSK component with pain from AVF placement  -EKG changes are new with prolonged QTc noted; repeat ekg in am   -nitro patch applied in ER.  -BP medications resumed  -aspirin, statin  -serial enzymes ordered, repeat stat trop/ck and again in am   - TTE in AM  - F/U cardio consult noted with concern for acute heart failure however, ProBNP can be elevated due to dialysis, patient does not appear to be fluid overloaded       2)Hyperglycemia due to type 2 diabetes mellitus-recently admitted and on insulin, kept on oral regimen on discharge with sliding scale only. With presenting hyponatremia which is likely worsened with recently elevated c/s   - Chronic DMII poorly controlled with insulin   - Lantus 10units at bedtime (patient is on sliding scale only at home based on last admission); no AG, however, will monitor closely. Patient just received 10units overnight and 12 units of coverage   - POCT  - Moderate Sliding Scale  - A1c pending for AM, recent one noted at 7.5     3)Lung effusion chronic since prior admission with routine dialysis    - CXR shows improvement in L lower lobe vs March film.   -no e/o SOB on exam     4) Abnormal TFTs - noted on last admission. Elev TSH with normal T4 and Low T3. Cannot definitely say she is subclinical. Will repeat tfts and consider adding a low dose of synthroid given patients overall status     5)ESRD on hemodialysis.    - M/W/F. Pt went two days prior to admission  - Renal Diet  - C/W Epogen.  - US pending AV fistula evaluation to see if thrombus formation given that she was taken off of coumadin recently   - Renal Called    6)Anemia due to chronic kidney disease.   - Improved Hb 10.4 vs 8.4 6weeks earlier  - Renal will continue with Epogen.      7)Hypertension.   - C/W home meds w/parameters  - Amlodipine 5mg daily  - Clonidine 0.3mg Q12h  - Carvedilol 12.5 Q12h  - Hydralazine 50mg Q8h  - Hydrochlorothizide 25mg daily  - Losartan 100mg daily.     8)Need for prophylactic measure.    - Lovenox renally dosed.     9) Dispo - pending cardiac w/up   Patient spoken to with  Ms. Elizabeth is a 57yo F with PMH HTN, HLD, DMII not on insulin at home, ESRD on HD M/W/F, hyponatremia with recent admission March 29-April 3 for vomiting and stomach pain admitted for chest pain.    Admit to Medicine/Resident Service Telemetry under Dr. Contreras/Katerine  Consistent Carbohydrates Renal Diet  Vital per routine  Ambulate as tolerated    1.Chest pain likely atypical in nature, r/o acs. Elevated Trop noted at 0.19 with prior admissions troponins noted at 0.14-.16. Given recent negative cath and EF of 55% along with Stress that is recent negative (Jan 2018), will admit to tele and monitor for arrhythmia   (last admission with PAF)   -there could be a MSK component with pain from AVF placement  -EKG changes are new with prolonged QTc noted; repeat ekg in am   -nitro patch applied in ER.  -BP medications resumed  -aspirin, statin  -serial enzymes ordered, repeat stat trop/ck and again in am   - TTE in AM  - F/U cardio consult noted with concern for acute heart failure however, ProBNP can be elevated due to dialysis, patient does not appear to be fluid overloaded  -taken off a/c as OP for unclear reasons, will speak to Cardiology as they felt on last admission that patient should be on coumadin and resume if agreed upon in am      2)Hyperglycemia due to type 2 diabetes mellitus-recently admitted and on insulin, kept on oral regimen on discharge with sliding scale only. With presenting hyponatremia which is likely worsened with recently elevated c/s   - Chronic DMII poorly controlled with insulin   - Lantus 10units at bedtime (patient is on sliding scale only at home based on last admission); no AG, however, will monitor closely. Patient just received 10units overnight and 12 units of coverage   - POCT  - Moderate Sliding Scale  - A1c pending for AM, recent one noted at 7.5     3)Lung effusion chronic since prior admission with routine dialysis    - CXR shows improvement in L lower lobe vs March film.   -no e/o SOB on exam     4) Abnormal TFTs - noted on last admission. Elev TSH with normal T4 and Low T3. Cannot definitely say she is subclinical. Will repeat tfts and consider adding a low dose of synthroid given patients overall status     5)ESRD on hemodialysis.    - M/W/F. Pt went two days prior to admission  - Renal Diet  - C/W Epogen.  - US pending AV fistula evaluation to see if thrombus formation given that she was taken off of coumadin recently   - Renal Called    6)Anemia due to chronic kidney disease.   - Improved Hb 10.4 vs 8.4 6weeks earlier  - Renal will continue with Epogen.      7)Hypertension.   - C/W home meds w/parameters  - Amlodipine 5mg daily  - Clonidine 0.3mg Q12h  - Carvedilol 12.5 Q12h  - Hydralazine 50mg Q8h  - Hydrochlorothizide 25mg daily  - Losartan 100mg daily.     8)Need for prophylactic measure.    - Lovenox renally dosed.     9) Dispo - pending cardiac w/up   Patient spoken to with

## 2018-05-13 NOTE — H&P ADULT - PROBLEM SELECTOR PLAN 5
with worsening progression of chronic kidney disease stage V   -prerenal azotemia likely 2/2 dehydration with nausea/vomiting for 3 weeks  -BUN/Cr 69/5.79 worsened from baseline in Feb 2017 of 23/1.3   -f/up repeat UA  -strict I/O Q 6 hours  -f/up renal consult (Dr. Kevin) M/W/F. Pt went two days prior to admission

## 2018-05-13 NOTE — H&P ADULT - NSHPOUTPATIENTPROVIDERS_GEN_ALL_CORE
Dr. Ramsey (PMD)  Dr. Maier (Heme)  Dr. Salazar (cardiology)  Dr. Gay (renal)  Dr. Patton (GI) Dr. Arleth Lopez (PMD)  Dr. Maier (Heme)  Dr. Salazar (cardiology)  Dr. Gay (renal)  Dr. Patton (GI)

## 2018-05-13 NOTE — H&P ADULT - HISTORY OF PRESENT ILLNESS
Ms. Elizabeth is a 57yo F with PMH HTN, HLD, DMII on insulin, ESRD on HD M/W/F, hyponatremia with recent admission March 29-April 3 for vomiting and stomach pain who presents with 3 days of intermittent chest pain and SOB. The pain refers to the L shoulder and upper back. She had the pain the day of admissions and two days before. This pain is different and less than previous pain it is 6-7/10 at it's worst and there is no pain at the time of the interview. The pain lasts about 1/2 hr. She says that the pain is relieved when she raises her L hand.     Of note, patient had recent negative stress test in January and in March. She was dx with H Pylori at the previous admission and just finished treatment. She had left on coumadin for AFib, but her hemeonc took her off. Perhaps there was confusion as to whether the coumadin was for the superficial UE thrombosis or for AFib. Her cardiologist is proceeding with planning for possible Watchman procedure.    She denies abdominal pain, N/V/D/C, lower extremity swelling, fever, chills. Ms. Elizabeth is a 57yo F with PMH HTN, HLD, DMII on insulin, ESRD on HD M/W/F (started on last admission here in April) with Right sided PC and left side AVF placed (pending maturation), hyponatremia with recent admission March 29-April 3 for vomiting and stomach pain who presents with 3 days of intermittent chest pain and SOB. The chest pain radiates to the left shoulder per patient and is associated with some palpitations and dizziness. She had the pain the day of admissions and two days before. During this episode of pain, she noted in when she woke up the night prior and cannot relate any activity to this painThis pain is different and less than previous pain it is 6-7/10 at it's worst and there is no pain at the time of the interview. The pain lasts about 1/2 hr. She says that the pain is relieved when she raises her L hand.     Of note, patient had recent negative stress test in January and in March. She was dx with H Pylori at the previous admission and just finished treatment. She had left on coumadin for AFib, but was taken off by the hematologist per patient.  Her cardiologist is proceeding with planning for possible Watchman procedure.    She denies abdominal pain, N/V/D/C, lower extremity swelling, fever, chills. Ms. Elizabeth is a 59yo F with PMH HTN, HLD, DMII on insulin, ESRD on HD M/W/F (started on last admission here in April) with Right sided PC and left side AVF placed (pending maturation), hyponatremia with recent admission March 29-April 3 for vomiting and stomach pain who presents with 3 days of intermittent chest pain. The chest pain radiates to the left shoulder per patient and is associated with some palpitations and dizziness. She had the pain the day of admissions and two days before. During this episode of pain, she noted in when she woke up the night prior and cannot relate any activity to this painThis pain is different and less than previous pain it is 6-7/10 at it's worst and there is no pain at the time of the interview. The pain lasts about 1/2 hr. She says that the pain is relieved when she raises her L hand.     Of note, patient had recent negative stress test in January and in March. She was dx with H Pylori at the previous admission and just finished treatment. She had left on coumadin for AFib, but was taken off by the hematologist per patient.  Her cardiologist is proceeding with planning for possible Watchman procedure.    She denies abdominal pain, N/V/D/C, lower extremity swelling, fever, chills. Ms. Elizabeth is a 59yo F with PMH HTN, HLD, DMII on insulin, ESRD on HD M/W/F (started on last admission here in April) with Right sided PC and left side AVF placed (pending maturation), hyponatremia with recent admission March 29-April 3 for vomiting and stomach pain who presents with 3 days of intermittent chest pain. The chest pain radiates to the left shoulder per patient and is associated with some palpitations and dizziness. She had the pain the day of admissions and two days before. During this episode of pain, she noted in when she woke up the night prior and cannot relate any activity to this painThis pain is different and less than previous pain it is 6-7/10 at it's worst and there is no pain at the time of the interview. The pain lasts about 1/2 hr. She says that the pain is relieved when she raises her L hand.     Of note, patient had recent negative stress test in January and in March. She was dx with H Pylori at the previous admission and just finished treatment. She had left on coumadin for AFib, but was taken off by the hematologist per patient.  Her cardiologist is proceeding with planning for possible Watchman procedure.    She denies abdominal pain, N/V/D/C, lower extremity swelling, fever, chills.    Notably, her prior hospital stay was for fluid overload, CKD V, complicated by uremic pericarditis, PAF on a/c with UGIB and negative egd; she was newly started on HD

## 2018-05-13 NOTE — H&P ADULT - NSHPREVIEWOFSYSTEMS_GEN_ALL_CORE
No sob, light headedness, difficulty breathing/cough, fevers/chills, abdominal pain, n/v, diarrhea/constipation, dysuria or increased urinary frequency.,

## 2018-05-13 NOTE — ED PROVIDER NOTE - OBJECTIVE STATEMENT
57 y/o F pt with hx of HTN, HLD, and DM presents to the ED with her daughter in-law c/o L sided chest pain associated with nausea, SOB, and dizziness. Pt states that the pain feels like a sharp pressure and that it radiates to her neck and to her back. She says she wasn't doing when this pain started, but when it started she felt very tiresome. She is currently on hemodialysis, and has been on it for a month. Denies vomiting, abdominal pain,

## 2018-05-13 NOTE — ED ADULT NURSE REASSESSMENT NOTE - NS ED NURSE REASSESS COMMENT FT1
Pt returned from US and reports no chest pain at this time, Nitro paste remains on chest, VSS, family and pt aware of admit to the hospital.

## 2018-05-13 NOTE — ED PROVIDER NOTE - PROGRESS NOTE DETAILS
Case d/w Cardiologist Dr Rasheed and recommend admission and will perform echo in am and re-evaluate

## 2018-05-13 NOTE — ED PROVIDER NOTE - MEDICAL DECISION MAKING DETAILS
57 y/o F pt with multiple medical problems, c/o acute onset chest pain concerning for, will order labs, ECG, US, and most likely will admit.

## 2018-05-13 NOTE — H&P ADULT - PROBLEM SELECTOR PLAN 6
Troponemia likely in the setting of CKD and demand ischemia due to anemia  -troponin 0.16 at 2100  -will f/up repeat troponin at 3am as above - Improved Hb 10.4 vs 8.4 6weeks earlier  - Renal will continue with Epogen

## 2018-05-13 NOTE — ED ADULT NURSE REASSESSMENT NOTE - NS ED NURSE REASSESS COMMENT FT1
Pt ambulated with assistance to the bathroom. Pt now complaining of right sided neck pain "same as I had when I came in". Denies any chest pain. Pts FS elevated. Call out to Dr. Contreras. Awaiting a call back. Pt remains with family @ bedside and they are aware of the plan of care.

## 2018-05-13 NOTE — ED PROVIDER NOTE - CARE PLAN
Principal Discharge DX:	Chest pain  Secondary Diagnosis:	DM (diabetes mellitus)  Secondary Diagnosis:	HLD (hyperlipidemia)

## 2018-05-13 NOTE — ED ADULT NURSE REASSESSMENT NOTE - NS ED NURSE REASSESS COMMENT FT1
Senior resident aware of the patients FS and to go with the sliding scale and Lantus as ordered. Pt to have Heat pack and Tylenol ordered for neck pain.

## 2018-05-13 NOTE — CONSULT NOTE ADULT - ASSESSMENT
Assessment/Plan: 59 y/o F pt with hx of HTN, HLD, and DM presents to the ED with her daughter in-law c/o L sided chest pain associated with nausea, SOB, and dizziness. Pt states that the pain feels like a sharp pressure and that it radiates to her neck and to her back. She says she wasn't doing when this pain started, but when it started she felt very tiresome. She is currently on hemodialysis, and has been on it for a month. Denies vomiting, abdominal pain.    1) Chest Pain: In setting of ESRD on HD with elevated Pro BNP 58,497 and CK 1,299, trop 1.9 and CXR showing cardiomegalia and right side pleural effusion, and given a recent catdiac cath 3/23/18 with clear coronaries and EF 60-65%. Acute HF with no ischemia on EKG and no chest pain with Nitro patch in place. Plan: TTE in AM serial CE   2) HTN: Controlled at present C/W out patient meds   3) Dm: Per PCT   4) ESRD on HD: Per renal  5) Right Pleural Effusion: Per PCT/CTS Assessment/Plan: 57 y/o F pt with hx of HTN, HLD, and DM presents to the ED with her daughter in-law c/o L sided chest pain associated with nausea, SOB, and dizziness. Pt states that the pain feels like a sharp pressure and that it radiates to her neck and to her back. She says she wasn't doing when this pain started, but when it started she felt very tiresome. She is currently on hemodialysis, and has been on it for a month. Denies vomiting, abdominal pain.    1) Chest Pain: In setting of ESRD on HD with elevated Pro BNP 58,497 and CK 1,299, trop 1.9 and CXR showing cardiomegalia and right side pleural effusion, and given a recent cardiac cath 3/23/18 with clear coronaries and EF 60-65%.     2) Acute on Chronic Diastolic HF with no ischemia on EKG and no chest pain with Nitro patch in place.   Plan: TTE in AM serial CE   Volume control with HD.    2) HTN: Controlled at present C/W out patient meds     3) Dm: Per PCT     4) ESRD on HD: Per renal    5) Right Pleural Effusion: Per PCT/CTS

## 2018-05-13 NOTE — H&P ADULT - PROBLEM SELECTOR PLAN 4
Acute lung infiltrate on CT with recent onset productive cough with white phlegm, tactile fever/chills concerning for CAP vs pulmonary edema  -2+ pitting edema on exam, no known history of CHF  -afebrile, no leukocytosis at this time  -f/up blood cultures x 2  -f/up sputum culture  -tylenol PRN fever, afebrile at this time - CXR shows improvement in L lower lobe vs March film

## 2018-05-13 NOTE — ED STATDOCS - PROGRESS NOTE DETAILS
57yo F hx of htn ESRD recently started on dialysis p/w chest pain radiating to left arm with numbness/tingling lightheadedness weakness turned pale diaphoretic with nausea earlier today. Denies f/c/v/sob/palpitations/ cough/rash/headache/abd.pain/d/c/dysuria/hematuria.--will send to MAIN for further eval and treatment

## 2018-05-13 NOTE — H&P ADULT - PROBLEM SELECTOR PLAN 7
Chronic DMII controlled Janumet  BID-held due to MARSHALL  -low dose HSS while inpatient, will adjust as needed  -0.3 x 68kg , 20 units likely needed based on weight - Troponemia likely in the setting of CKD   - troponin 0.19 about equivalent to March values  - serial trops x 2 more

## 2018-05-13 NOTE — H&P ADULT - PROBLEM SELECTOR PLAN 3
Postprandial nausea/vomiting with scant hematemesis  -Advance diet as tolerated  -IV fluids for dehydration  -zofran Q6 PRN nausea/vomiting  -c/t monitor vitals - Chronic

## 2018-05-14 ENCOUNTER — APPOINTMENT (OUTPATIENT)
Dept: ELECTROPHYSIOLOGY | Facility: CLINIC | Age: 58
End: 2018-05-14

## 2018-05-14 DIAGNOSIS — N18.6 END STAGE RENAL DISEASE: ICD-10-CM

## 2018-05-14 DIAGNOSIS — I77.0 ARTERIOVENOUS FISTULA, ACQUIRED: Chronic | ICD-10-CM

## 2018-05-14 DIAGNOSIS — Z49.01 ENCOUNTER FOR FITTING AND ADJUSTMENT OF EXTRACORPOREAL DIALYSIS CATHETER: Chronic | ICD-10-CM

## 2018-05-14 DIAGNOSIS — E87.1 HYPO-OSMOLALITY AND HYPONATREMIA: ICD-10-CM

## 2018-05-14 DIAGNOSIS — N18.9 CHRONIC KIDNEY DISEASE, UNSPECIFIED: ICD-10-CM

## 2018-05-14 LAB
ANION GAP SERPL CALC-SCNC: 17 MMOL/L — SIGNIFICANT CHANGE UP (ref 5–17)
BASOPHILS # BLD AUTO: 0 K/UL — SIGNIFICANT CHANGE UP (ref 0–0.2)
BASOPHILS NFR BLD AUTO: 0.5 % — SIGNIFICANT CHANGE UP (ref 0–2)
BUN SERPL-MCNC: 54 MG/DL — HIGH (ref 8–20)
CALCIUM SERPL-MCNC: 9 MG/DL — SIGNIFICANT CHANGE UP (ref 8.6–10.2)
CHLORIDE SERPL-SCNC: 90 MMOL/L — LOW (ref 98–107)
CK MB CFR SERPL CALC: 16.1 NG/ML — HIGH (ref 0–6.7)
CK MB CFR SERPL CALC: 18.2 NG/ML — HIGH (ref 0–6.7)
CK SERPL-CCNC: 1087 U/L — HIGH (ref 25–170)
CK SERPL-CCNC: 952 U/L — HIGH (ref 25–170)
CO2 SERPL-SCNC: 21 MMOL/L — LOW (ref 22–29)
CREAT SERPL-MCNC: 4.77 MG/DL — HIGH (ref 0.5–1.3)
EOSINOPHIL # BLD AUTO: 0.1 K/UL — SIGNIFICANT CHANGE UP (ref 0–0.5)
EOSINOPHIL NFR BLD AUTO: 2.3 % — SIGNIFICANT CHANGE UP (ref 0–6)
GLUCOSE BLDC GLUCOMTR-MCNC: 114 MG/DL — HIGH (ref 70–99)
GLUCOSE BLDC GLUCOMTR-MCNC: 130 MG/DL — HIGH (ref 70–99)
GLUCOSE BLDC GLUCOMTR-MCNC: 347 MG/DL — HIGH (ref 70–99)
GLUCOSE BLDC GLUCOMTR-MCNC: 364 MG/DL — HIGH (ref 70–99)
GLUCOSE BLDC GLUCOMTR-MCNC: 386 MG/DL — HIGH (ref 70–99)
GLUCOSE SERPL-MCNC: 390 MG/DL — HIGH (ref 70–115)
HBA1C BLD-MCNC: 8.5 % — HIGH (ref 4–5.6)
HCT VFR BLD CALC: 30.6 % — LOW (ref 37–47)
HGB BLD-MCNC: 10.1 G/DL — LOW (ref 12–16)
LYMPHOCYTES # BLD AUTO: 0.9 K/UL — LOW (ref 1–4.8)
LYMPHOCYTES # BLD AUTO: 22.5 % — SIGNIFICANT CHANGE UP (ref 20–55)
MAGNESIUM SERPL-MCNC: 2.2 MG/DL — SIGNIFICANT CHANGE UP (ref 1.6–2.6)
MCHC RBC-ENTMCNC: 28.2 PG — SIGNIFICANT CHANGE UP (ref 27–31)
MCHC RBC-ENTMCNC: 33 G/DL — SIGNIFICANT CHANGE UP (ref 32–36)
MCV RBC AUTO: 85.5 FL — SIGNIFICANT CHANGE UP (ref 81–99)
MONOCYTES # BLD AUTO: 0.4 K/UL — SIGNIFICANT CHANGE UP (ref 0–0.8)
MONOCYTES NFR BLD AUTO: 10.3 % — HIGH (ref 3–10)
NEUTROPHILS # BLD AUTO: 2.6 K/UL — SIGNIFICANT CHANGE UP (ref 1.8–8)
NEUTROPHILS NFR BLD AUTO: 64.4 % — SIGNIFICANT CHANGE UP (ref 37–73)
PHOSPHATE SERPL-MCNC: 4 MG/DL — SIGNIFICANT CHANGE UP (ref 2.4–4.7)
PLATELET # BLD AUTO: 225 K/UL — SIGNIFICANT CHANGE UP (ref 150–400)
POTASSIUM SERPL-MCNC: 4.7 MMOL/L — SIGNIFICANT CHANGE UP (ref 3.5–5.3)
POTASSIUM SERPL-SCNC: 4.7 MMOL/L — SIGNIFICANT CHANGE UP (ref 3.5–5.3)
RBC # BLD: 3.58 M/UL — LOW (ref 4.4–5.2)
RBC # FLD: 15 % — SIGNIFICANT CHANGE UP (ref 11–15.6)
SODIUM SERPL-SCNC: 128 MMOL/L — LOW (ref 135–145)
T3 SERPL-MCNC: 84 NG/DL — SIGNIFICANT CHANGE UP (ref 80–200)
T4 AB SER-ACNC: 7.1 UG/DL — SIGNIFICANT CHANGE UP (ref 4.5–12)
TROPONIN T SERPL-MCNC: 0.16 NG/ML — HIGH (ref 0–0.06)
TROPONIN T SERPL-MCNC: 0.16 NG/ML — HIGH (ref 0–0.06)
TSH SERPL-MCNC: 7.04 UIU/ML — HIGH (ref 0.27–4.2)
WBC # BLD: 4 K/UL — LOW (ref 4.8–10.8)
WBC # FLD AUTO: 4 K/UL — LOW (ref 4.8–10.8)

## 2018-05-14 PROCEDURE — 93010 ELECTROCARDIOGRAM REPORT: CPT

## 2018-05-14 PROCEDURE — 99233 SBSQ HOSP IP/OBS HIGH 50: CPT

## 2018-05-14 PROCEDURE — 99233 SBSQ HOSP IP/OBS HIGH 50: CPT | Mod: GC

## 2018-05-14 RX ORDER — IBUPROFEN 200 MG
400 TABLET ORAL EVERY 6 HOURS
Qty: 0 | Refills: 0 | Status: DISCONTINUED | OUTPATIENT
Start: 2018-05-14 | End: 2018-05-16

## 2018-05-14 RX ORDER — COLCHICINE 0.6 MG
0.6 TABLET ORAL DAILY
Qty: 0 | Refills: 0 | Status: DISCONTINUED | OUTPATIENT
Start: 2018-05-14 | End: 2018-05-16

## 2018-05-14 RX ORDER — INSULIN GLARGINE 100 [IU]/ML
5 INJECTION, SOLUTION SUBCUTANEOUS ONCE
Qty: 0 | Refills: 0 | Status: COMPLETED | OUTPATIENT
Start: 2018-05-14 | End: 2018-05-14

## 2018-05-14 RX ORDER — INSULIN GLARGINE 100 [IU]/ML
15 INJECTION, SOLUTION SUBCUTANEOUS AT BEDTIME
Qty: 0 | Refills: 0 | Status: DISCONTINUED | OUTPATIENT
Start: 2018-05-14 | End: 2018-05-14

## 2018-05-14 RX ORDER — INSULIN GLARGINE 100 [IU]/ML
20 INJECTION, SOLUTION SUBCUTANEOUS AT BEDTIME
Qty: 0 | Refills: 0 | Status: DISCONTINUED | OUTPATIENT
Start: 2018-05-14 | End: 2018-05-15

## 2018-05-14 RX ORDER — INSULIN LISPRO 100/ML
6 VIAL (ML) SUBCUTANEOUS
Qty: 0 | Refills: 0 | Status: DISCONTINUED | OUTPATIENT
Start: 2018-05-14 | End: 2018-05-14

## 2018-05-14 RX ORDER — INSULIN LISPRO 100/ML
8 VIAL (ML) SUBCUTANEOUS
Qty: 0 | Refills: 0 | Status: DISCONTINUED | OUTPATIENT
Start: 2018-05-14 | End: 2018-05-15

## 2018-05-14 RX ADMIN — Medication 0.3 MILLIGRAM(S): at 05:12

## 2018-05-14 RX ADMIN — ENOXAPARIN SODIUM 30 MILLIGRAM(S): 100 INJECTION SUBCUTANEOUS at 13:19

## 2018-05-14 RX ADMIN — Medication 50 MILLIGRAM(S): at 05:13

## 2018-05-14 RX ADMIN — INSULIN GLARGINE 20 UNIT(S): 100 INJECTION, SOLUTION SUBCUTANEOUS at 21:41

## 2018-05-14 RX ADMIN — LOSARTAN POTASSIUM 100 MILLIGRAM(S): 100 TABLET, FILM COATED ORAL at 05:12

## 2018-05-14 RX ADMIN — CARVEDILOL PHOSPHATE 12.5 MILLIGRAM(S): 80 CAPSULE, EXTENDED RELEASE ORAL at 05:13

## 2018-05-14 RX ADMIN — INSULIN GLARGINE 5 UNIT(S): 100 INJECTION, SOLUTION SUBCUTANEOUS at 05:37

## 2018-05-14 RX ADMIN — Medication 100 MILLIGRAM(S): at 05:12

## 2018-05-14 RX ADMIN — Medication 1 INCH(S): at 07:24

## 2018-05-14 RX ADMIN — SIMVASTATIN 40 MILLIGRAM(S): 20 TABLET, FILM COATED ORAL at 21:41

## 2018-05-14 RX ADMIN — Medication 8 UNIT(S): at 18:00

## 2018-05-14 RX ADMIN — Medication 81 MILLIGRAM(S): at 13:20

## 2018-05-14 RX ADMIN — Medication 325 MILLIGRAM(S): at 13:20

## 2018-05-14 RX ADMIN — Medication 10: at 09:21

## 2018-05-14 RX ADMIN — AMLODIPINE BESYLATE 5 MILLIGRAM(S): 2.5 TABLET ORAL at 05:12

## 2018-05-14 RX ADMIN — Medication 25 MILLIGRAM(S): at 05:13

## 2018-05-14 RX ADMIN — Medication 8 UNIT(S): at 13:14

## 2018-05-14 RX ADMIN — Medication 8: at 13:14

## 2018-05-14 NOTE — CONSULT NOTE ADULT - SUBJECTIVE AND OBJECTIVE BOX
[place] [facility]/EP                                                        [facility]                                                             Office: [address] [address]                                                            Telephone: [phone]. Fax:[phone]          Patient is a 58y old  Female who presents with a chief complaint of Chest Pain (13 May 2018 22:01)      HPI:  Ms. Elizabeth is a 57yo F with PMH HTN, HLD, DMII on insulin, h/o PAF not on OAC due to elevated  bleeding risks and known prior UGIB ( EGD (-)), nonobstructive CAD on cardiac cath 3/2018; ESRD on HD M/W/F (started on last admission here in April) with Right sided Pleuretic cp. Tni (-) ECG SR with repol changes. Patient being w/up for presumed acute pericarditis. Patient denies Syncope , Orthopnea, PND, LE edema.  Patient previously seen for consideration of WATCHMAN evaluation.  EP consulted for  AF management.        PAST MEDICAL & SURGICAL HISTORY:  Pleural effusion  Pericardial effusion  End stage renal disease on dialysis  HLD (hyperlipidemia)  HTN (hypertension)  DM (diabetes mellitus)  A-V fistula  Encounter for dialysis catheter care      < from: TTE Echo Limited or F/U (18 @ 11:09) >    Summary:   1. Left ventricular ejection fraction, by visual estimation, is 45 to   50%.   2. Mildly decreased global left ventricular systolic function.   3. Spectral Doppler shows impaired relaxation pattern of left   ventricular myocardial filling (Grade I diastolic dysfunction).   4. Moderate pleural effusion in both left and right lateral regions.   5. Trivial pericardial effusion.   6. Thickening of the anterior and posterior mitral valve leaflets.   7. Limited follow up echo for pericardial effusion. Echo done on 3/4/18   moderate pericardial effusion seen.      < from: Cardiac Cath Lab - Adult (18 @ 13:53) >  VENTRICLES: There were no left ventricular global or regional wall motion  abnormalities. Global left ventricular function was normal. EF calculated  by contrast ventriculography was 55 %.  VALVES: AORTIC VALVE: No significant aortic valve gradient. MITRAL VALVE:  The mitral valve exhibited trivial regurgitation (less than 1+).  CORONARY VESSELS: The coronary circulation is right dominant.  LM:   --  LM: Normal. The vessel was normal sized, not calcified, and not  tortuous. Angiography showed no evidence of disease.  LAD:   --  LAD: Normal. The vessel was normal sized, not calcified, and not  tortuous. Angiography showed minor luminal irregularities with no flow  limiting lesions.  CX:   --  Circumflex: Normal. The vessel was normal sized, not calcified,  and excessively tortuous.Angiography showed no evidence of disease.  RCA:   --  RCA: Normal. The vessel was normal sized, not calcified, and  moderately tortuous. Angiography showed no evidence of disease.    < end of copied text >        Allergies    No Known Allergies    Intolerances        MEDICATIONS  (STANDING):  amLODIPine   Tablet 5 milliGRAM(s) Oral daily  aspirin enteric coated 81 milliGRAM(s) Oral daily  carvedilol 12.5 milliGRAM(s) Oral every 12 hours  cloNIDine 0.3 milliGRAM(s) Oral every 12 hours  dextrose 5%. 1000 milliLiter(s) (50 mL/Hr) IV Continuous <Continuous>  dextrose 50% Injectable 12.5 Gram(s) IV Push once  dextrose 50% Injectable 25 Gram(s) IV Push once  dextrose 50% Injectable 25 Gram(s) IV Push once  enoxaparin Injectable 30 milliGRAM(s) SubCutaneous daily  ferrous    sulfate 325 milliGRAM(s) Oral daily  hydrALAZINE 50 milliGRAM(s) Oral every 8 hours  hydrochlorothiazide 25 milliGRAM(s) Oral daily  insulin glargine Injectable (LANTUS) 20 Unit(s) SubCutaneous at bedtime  insulin lispro (HumaLOG) corrective regimen sliding scale   SubCutaneous three times a day before meals  insulin lispro Injectable (HumaLOG) 8 Unit(s) SubCutaneous three times a day before meals  losartan 100 milliGRAM(s) Oral daily  simvastatin 40 milliGRAM(s) Oral at bedtime    MEDICATIONS  (PRN):  acetaminophen   Tablet. 650 milliGRAM(s) Oral every 6 hours PRN Mild Pain (1 - 3)  dextrose 40% Gel 15 Gram(s) Oral once PRN Blood Glucose LESS THAN 70 milliGRAM(s)/deciliter  docusate sodium 100 milliGRAM(s) Oral daily PRN for constipation  glucagon  Injectable 1 milliGRAM(s) IntraMuscular once PRN Glucose LESS THAN 70 milligrams/deciliter  senna 2 Tablet(s) Oral at bedtime PRN Constipation      FAMILY HISTORY:  No pertinent family history in first degree relatives      SOCIAL HISTORY:Lives with family    CIGARETTES:None    ALCOHOL:None    REVIEW OF SYSTEMS:  CONSTITUTIONAL: No fever, weight loss, or fatigue  EYES: No eye pain, visual disturbances, or discharge  ENMT:  No difficulty hearing, tinnitus, vertigo; No sinus or throat pain  NECK: No pain or stiffness  RESPIRATORY: No cough, wheezing, chills or hemoptysis; No Shortness of Breath  CARDIOVASCULAR: (+) chest pain, palpitations, passing out, dizziness, or leg swelling  GASTROINTESTINAL: No abdominal or epigastric pain. No nausea, vomiting, or hematemesis; No diarrhea or constipation. No melena or hematochezia.  GENITOURINARY: No dysuria, frequency, hematuria, or incontinence  NEUROLOGICAL: No headaches, memory loss, loss of strength, numbness, or tremors  SKIN: No itching, burning, rashes, or lesions   LYMPH Nodes: No enlarged glands  ENDOCRINE: No heat or cold intolerance; No hair loss  MUSCULOSKELETAL: No joint pain or swelling; No muscle, back, or extremity pain  PSYCHIATRIC: No depression, anxiety, mood swings, or difficulty sleeping  HEME/LYMPH: No easy bruising, or bleeding gums  ALLERY AND IMMUNOLOGIC: No hives or eczema	    Vital Signs Last 24 Hrs  T(C): 36.4 (14 May 2018 14:00), Max: 36.4 (13 May 2018 17:36)  T(F): 97.5 (14 May 2018 14:00), Max: 97.6 (14 May 2018 11:35)  HR: 72 (14 May 2018 14:00) (67 - 74)  BP: 98/45 (14 May 2018 14:00) (98/45 - 135/80)  BP(mean): --  RR: 18 (14 May 2018 14:00) (16 - 18)  SpO2: 95% (14 May 2018 14:00) (94% - 99%)    Daily Height in cm: 165.1 (13 May 2018 17:36)    Daily     I&O's Detail      PHYSICAL EXAM:  Appearance: Normal, well nourished	  HEENT:   Normal oral mucosa, PERRL, EOMI, sclera non-icteric	  Lymphatic: No cervical lymphadenopathy  Cardiovascular: Normal S1 S2, No JVD, No cardiac murmurs, No carotid bruits, No peripheral edema  Respiratory: Lungs clear to auscultation	  Psychiatry: A & O x 3, Mood & affect appropriate  Gastrointestinal:  Soft, Non-tender, + BS, no bruits	  Skin: No rashes, No ecchymoses, No cyanosis  Neurologic: Grossly non-focal with full strength in all four extremities  Extremities: Normal range of motion, No clubbing, cyanosis or edema  Vascular: Peripheral pulses palpable 2+ bilaterally      INTERPRETATION OF TELEMETRY:    ECG: SR  (+) repol     LABS:                        10.1   4.0   )-----------( 225      ( 14 May 2018 07:07 )             30.6     05-14    128<L>  |  90<L>  |  54.0<H>  ----------------------------<  390<H>  4.7   |  21.0<L>  |  4.77<H>    Ca    9.0      14 May 2018 07:07  Phos  4.0       Mg     2.2         TPro  6.7  /  Alb  3.0<L>  /  TBili  0.3<L>  /  DBili  x   /  AST  52<H>  /  ALT  18  /  AlkPhos  88        CARDIAC MARKERS ( 14 May 2018 07:07 )   U/L / CKMB16.1 ng/mL / Troponin T0.16 ng/mL /  CARDIAC MARKERS ( 14 May 2018 00:42 )  [id number] U/L / CKMB18.2 ng/mL / Troponin T0.16 ng/mL /  CARDIAC MARKERS ( 13 May 2018 18:51 )  [id number] U/L / CKMB21.2 ng/mL / Troponin T0.19 ng/mL /      PT/INR - ( 13 May 2018 18:51 )   PT: 11.5 sec;   INR: 1.04 ratio         PTT - ( 13 May 2018 18:51 )  PTT:31.5 sec  Urinalysis Basic - ( 13 May 2018 21:43 )    Color: Yellow / Appearance: Slightly Turbid / S.010 / pH: x  Gluc: x / Ketone: Negative  / Bili: Negative / Urobili: Negative mg/dL   Blood: x / Protein: 500 mg/dL / Nitrite: Negative   Leuk Esterase: Trace / RBC: >50 /HPF / WBC 6-10   Sq Epi: x / Non Sq Epi: Occasional / Bacteria: Occasional      I&O's Summary    BNPSerum Pro-Brain Natriuretic Peptide: [id number] pg/mL ( @ 18:51)    RADIOLOGY & ADDITIONAL STUDIES:

## 2018-05-14 NOTE — CONSULT NOTE ADULT - ASSESSMENT
ESRD - On HD now but BP dropped into 80s and 1 KG goal was cancelled, BP came up  Htn - on multiple meds - will need to adjust  No excess fluid at present - watch   CP - cardiac eval

## 2018-05-14 NOTE — PROGRESS NOTE ADULT - ASSESSMENT
58F  hx HTN, HLD, DMII on insulin, ESRD on HD M/W/F, hyponatremia with recent admission March 2018 with CP and SOB and had MARTINEZ including  LHC 3/2018 mild irregularity of coronary anatomy and EF 55; Echo  3/2018: EF 60-65. DD 1. Moderate peric eff. Questionable llambl's visualized on the aortic valve.  She was found GI bleed profound anemia , ? CAP and newly dx AF and was evaluated for Watchman by EP (dr. Garland) given the need for systemic OAC given elevated [id number] score EP consulted for possible KELLI occlusion device.  Presented to ED with CP.       1) Chest Pain: Likely due to pericardial effusion.  Limited Echo to evaluate peric eff.  May need NSAIDs and Colchicine     2) Abnl Trop: In setting of ESRD on HD with elevated Pro BNP 58,497 and CK 1,299, trop 1.9 and CXR showing cardiomegalia and right side pleural effusion, and given a recent cardiac cath 3/23/18 with clear coronaries and EF 60-65%.     3) Acute on Chronic Diastolic HF with no ischemia on EKG and no chest pain with Nitro patch in place.   Plan: TTE in AM serial CE   Volume control with HD.    4) HTN: Controlled at present C/W out patient meds     3) Dm: Per PCT     5) ESRD on HD: Per renal    6) Right Pleural Effusion: Per PCT/CTS    7) Afib: Re-Consult EP, Dr. Garland notified.

## 2018-05-14 NOTE — PROGRESS NOTE ADULT - ASSESSMENT
Ms. Elizabeth is a 59yo F with PMH HTN, HLD, DMII not on insulin at home, ESRD on HD M/W/F, hyponatremia with recent admission March 29-April 3 for vomiting and stomach pain. This admission is for chest pain.    1) Chest pain likely atypical in nature, r/o acs.   - Elevated Trop noted at 0.19 with prior admissions troponins noted at 0.14-.16. Given recent negative cath and EF of 55% along with Stress that is recent negative (Jan 2018), will admit to tele and monitor for arrhythmia   (last admission with PAF)   - There could be a MSK component with pain from AVF placement  - EKG changes are new with prolonged QTc noted; repeat ekg in am   - Nitro patch applied in ER.  - BP medications resumed  - Aspirin, statin  - Serial enzymes ordered, repeat stat trop/ck and again in am   - TTE in AM  - F/U cardio consult noted with concern for acute heart failure however, ProBNP can be elevated due to dialysis, patient does not appear to be fluid overloaded  - Taken off a/c as OP for unclear reasons, will speak to Cardiology as they felt on last admission that patient should be on coumadin and resume if agreed upon in am      2) Hyperglycemia due to type 2 diabetes mellitus-recently admitted and on insulin, kept on oral regimen on discharge with sliding scale only. With presenting hyponatremia which is likely worsened with recently elevated c/s   - Chronic DMII poorly controlled with insulin   - Increase Lantus to 15 units at bedtime (patient is on sliding scale only at home based on last admission); no AG, however, will monitor closely. Patient just received 10units overnight and 12 units of coverage   - POCT  - C/W Moderate Sliding Scale (12 units given before snack)  - A1c pending for AM, recent one noted at 7.5     3) Lung effusion chronic since prior admission with routine dialysis    - CXR shows improvement in L lower lobe vs March film.   -no evidence of SOB on exam     4) Abnormal TFTs - noted on last admission. Elevated TSH with normal T4 and Low T3. Cannot definitely say she is subclinical. Will repeat TFTs and consider adding a low dose of synthroid given patients overall status     5) ESRD on hemodialysis.    - M/W/F. Pt due today  - Renal Diet  - C/W Epogen.  - US pending AV fistula evaluation to see if thrombus formation given that she was taken off of coumadin recently   - Renal Called    6) Anemia due to chronic kidney disease.   - Improved Hb 10.4 vs 8.4 6 weeks earlier  - Renal will continue with Epogen.      7)Hypertension.   - C/W home meds w/parameters  - Amlodipine 5mg daily  - Clonidine 0.3mg Q12h  - Carvedilol 12.5 Q12h  - Hydralazine 50mg Q8h  - Hydrochlorothizide 25mg daily  - Losartan 100mg daily.     8) Need for prophylactic measure.    - Lovenox renally dosed.     9) Dispo - pending cardiac w/up   - Patient spoken to with

## 2018-05-14 NOTE — ED ADULT NURSE REASSESSMENT NOTE - NS ED NURSE REASSESS COMMENT FT1
Pt resting comfortably in NAD resp even and unlabored NSR 66 on cardiac monitor. Safety maintained bed locked in lowest position, pt awaiting bed placement.

## 2018-05-14 NOTE — CONSULT NOTE ADULT - ATTENDING COMMENTS
57yo F with PMH HTN, HLD, DMII on insulin, h/o PAF not on OAC due to elevated  bleeding risks and known prior UGIB ( EGD (-)), nonobstructive CAD on cardiac cath 3/2018; ESRD on HD M/W/F (started on last admission here in April) with Right sided Pleuretic cp. Tni (-) ECG SR with repol changes. Patient being w/up for presumed acute pericarditis. Patient denies Syncope , Orthopnea, PND, LE edema.  Patient previously seen for consideration of WATCHMAN evaluation.  EP consulted for  AF management.        Imp  1: PAF : elevated HASBLED >=4 ( HTN, ESRD, GIB, Labile INR) will discuss WATCHMAN option likely will not proceed until pericarditis resolved . Currently in NSR is recurrent PAF with RVR consider Amiodarone short-term for SR maintenance.  2: Atypical cp: recent cath non-obs cad, nl LVEF  55% , improving per-cardial effusion c/w 3/2018. Agree with trial of NSAIDS-Colchicine
Agree with H&P

## 2018-05-14 NOTE — CONSULT NOTE ADULT - SUBJECTIVE AND OBJECTIVE BOX
Patient is a 58y old  Female who presents with a chief complaint of Chest Pain (13 May 2018 22:01)  ESRD - Last treatment friday without problems  Son present    HPI:  Ms. Elizabeth is a 59yo F with PMH HTN, HLD, DMII on insulin, ESRD on HD M/W/F (started on last admission here in April) with Right sided PC and left side AVF placed (pending maturation), hyponatremia with recent admission -April 3 for vomiting and stomach pain who presents with 3 days of intermittent chest pain. The chest pain radiates to the left shoulder per patient and is associated with some palpitations and dizziness. She had the pain the day of admissions and two days before. During this episode of pain, she noted in when she woke up the night prior and cannot relate any activity to this painThis pain is different and less than previous pain it is 6-7/10 at it's worst and there is no pain at the time of the interview. The pain lasts about 1/2 hr. She says that the pain is relieved when she raises her L hand.     Of note, patient had recent negative stress test in January and in March. She was dx with H Pylori at the previous admission and just finished treatment. She had left on coumadin for AFib, but was taken off by the hematologist per patient.  Her cardiologist is proceeding with planning for possible Watchman procedure.    She denies abdominal pain, N/V/D/C, lower extremity swelling, fever, chills.    Notably, her prior hospital stay was for fluid overload, CKD V, complicated by uremic pericarditis, PAF on a/c with UGIB and negative egd; she was newly started on HD (13 May 2018 22:01)      PAST MEDICAL & SURGICAL HISTORY:  Pleural effusion  Pericardial effusion  End stage renal disease on dialysis  HLD (hyperlipidemia)  HTN (hypertension)  DM (diabetes mellitus)  A-V fistula  Encounter for dialysis catheter care    FAMILY HISTORY:  No pertinent family history in first degree relatives    Social History:   -smoke   - Alcohol   -    Drugs        REVIEW OF SYSTEMS:  CONSTITUTIONAL: No fever, weight loss, or fatigue  EYES: No eye pain, No visual disturbances,   RESPIRATORY: No cough, hemoptysis; - SOB  CARDIOVASCULAR: Intermittent chest pain, No palpitations,   -leg swelling  GASTROINTESTINAL  Had some abdominal pain ,  GENITOURINARY: No UTI sx/hematuria  NEUROLOGICAL: No HA/wk/numbness  MUSCULOSKELETAL: No joint pain, No swelling      Vital Signs Last 24 Hrs  T(C): 36.4 (14 May 2018 14:00), Max: 36.4 (13 May 2018 17:36)  T(F): 97.5 (14 May 2018 14:00), Max: 97.6 (14 May 2018 11:35)  HR: 72 (14 May 2018 14:00) (67 - 74)  BP: 98/45 (14 May 2018 14:00) (98/45 - 135/80)  BP(mean): --  RR: 18 (14 May 2018 14:00) (16 - 18)  SpO2: 95% (14 May 2018 14:00) (94% - 99%)    PHYSICAL EXAM:    GENERAL: NAD,   EYES:  conjunctiva and sclera clear  NECK: Supple, No JVD/Carotid Bruit -ve   NERVOUS SYSTEM:  A/O x3,   Lungs : No rales, No rhonchi,   HEART:  No murmur,  No rub, No gallops  ABDOMEN: Soft, NT/ND BS+  EXTREMITIES:  + Peripheral Pulses, - edema  SKIN: No rashes or lesions      LABS:                        10.1   4.0   )-----------( 225      ( 14 May 2018 07:07 )             30.6     05-14    128<L>  |  90<L>  |  54.0<H>  ----------------------------<  390<H>  4.7   |  21.0<L>  |  4.77<H>    Ca    9.0      14 May 2018 07:07  Phos  4.0     05-14  Mg     2.2     05-14    TPro  6.7  /  Alb  3.0<L>  /  TBili  0.3<L>  /  DBili  x   /  AST  52<H>  /  ALT  18  /  AlkPhos  88  05-13    PT/INR - ( 13 May 2018 18:51 )   PT: 11.5 sec;   INR: 1.04 ratio         PTT - ( 13 May 2018 18:51 )  PTT:31.5 sec  Urinalysis Basic - ( 13 May 2018 21:43 )    Color: Yellow / Appearance: Slightly Turbid / S.010 / pH: x  Gluc: x / Ketone: Negative  / Bili: Negative / Urobili: Negative mg/dL   Blood: x / Protein: 500 mg/dL / Nitrite: Negative   Leuk Esterase: Trace / RBC: >50 /HPF / WBC 6-10   Sq Epi: x / Non Sq Epi: Occasional / Bacteria: Occasional      Magnesium, Serum: 2.2 mg/dL ( @ 07:07)  Phosphorus Level, Serum: 4.0 mg/dL ( @ 07:07)  Magnesium, Serum: 2.1 mg/dL ( @ 18:51)      RADIOLOGY & ADDITIONAL TESTS: CXR - Mod R and Small L pl eff    MEDICATIONS  (STANDING):  acetaminophen   Tablet. PRN  amLODIPine   Tablet  aspirin enteric coated  carvedilol  cloNIDine  colchicine  dextrose 40% Gel PRN  dextrose 5%.  dextrose 50% Injectable  dextrose 50% Injectable  dextrose 50% Injectable  docusate sodium PRN  enoxaparin Injectable  ferrous    sulfate  glucagon  Injectable PRN  hydrALAZINE  hydrochlorothiazide  ibuprofen  Tablet PRN  insulin glargine Injectable (LANTUS)  insulin lispro (HumaLOG) corrective regimen sliding scale  insulin lispro Injectable (HumaLOG)  losartan  senna PRN  simvastatin

## 2018-05-14 NOTE — ED ADULT NURSE REASSESSMENT NOTE - NS ED NURSE REASSESS COMMENT FT1
Terence @ bedside pt states she wants stool softener with morning meds. Pt with very little strength checked sugar, FS still high will recheck with breakfast for sliding scale.  Terence @ bedside pt states she wants stool softener with morning meds. Pt with very little strength checked sugar, FS still high, Dr. Hoffmann aware and will order some more Lantus.  Pt denies any c/p.

## 2018-05-14 NOTE — PROGRESS NOTE ADULT - SUBJECTIVE AND OBJECTIVE BOX
Patient is a 58y old  Female who presents with a chief complaint of Chest Pain (13 May 2018 22:01)    INTERVAL HPI/OVERNIGHT EVENTS:  57yo Female seen at bedside and chart reviewed. Pt has no complaints overnight.   Pt denies SOB, cough, CP, palpitations, Headache, lightheadedness, dizziness, abdominal pain, nausea, vomiting, diarrhea, constipation, dysuria, lower extremity edema    Allergies    No Known Allergies    Intolerances    Vital Signs Last 24 Hrs  T(C): 36.3 (14 May 2018 03:07), Max: 36.4 (13 May 2018 17:36)  T(F): 97.4 (14 May 2018 03:07), Max: 97.5 (13 May 2018 17:36)  HR: 67 (14 May 2018 05:07) (67 - 74)  BP: 118/74 (14 May 2018 05:07) (112/74 - 135/80)  RR: 18 (14 May 2018 03:07) (16 - 18)  SpO2: 98% (14 May 2018 03:07) (98% - 99%)    Daily Height in cm: 165.1 (13 May 2018 17:36)    Daily   I&O's Detail    PHYSICAL EXAM:    Constitutional: NAD, laying in bed, pale-appearing  HEENT: PERRLA, EOMI ; conjunctival pallor  Neck: No JVD, supple  Respiratory: +inspiratory and expiratory crackles in lower lobes b/l, no wheezing, rhonchi  Cardiovascular: S1 and S2, RRR, no m/r/g; No current chest pain  Gastrointestinal: BS+ normoactive, soft, ND, NTTP   Extremities: FROM, no edema, calf pain, AV fistula noted on LUE  Vascular: 2+ peripheral pulses; cap refill >2 sec, pallor to extremities and nailbeds  Neurological: AAO x 3, no focal deficits   Musculoskeletal: 5/5 strength b/l upper and lower extremities	    LABS:             10.4   6.2   )-----------( 249      ( 13 May 2018 18:51 )             31.8     CBC Full  -  ( 13 May 2018 18:51 )  WBC Count : 6.2 K/uL  Hemoglobin : 10.4 g/dL  Hematocrit : 31.8 %  Platelet Count - Automated : 249 K/uL  Mean Cell Volume : 86.9 fl  Mean Cell Hemoglobin : 28.4 pg  Mean Cell Hemoglobin Concentration : 32.7 g/dL  Auto Neutrophil # : 4.4 K/uL  Auto Lymphocyte # : 1.0 K/uL  Auto Monocyte # : 0.6 K/uL  Auto Eosinophil # : 0.1 K/uL  Auto Basophil # : 0.0 K/uL  Auto Neutrophil % : 72.2 %  Auto Lymphocyte % : 16.6 %  Auto Monocyte % : 9.3 %  Auto Eosinophil % : 1.5 %  Auto Basophil % : 0.2 %      127<L>  |  89<L>  |  47.0<H>  ----------------------------<  478<H>  5.1   |  22.0  |  4.31<H>    Ca    8.9      13 May 2018 18:51  Mg     2.1         TPro  6.7  /  Alb  3.0<L>  /  TBili  0.3<L>  /  DBili  x   /  AST  52<H>  /  ALT  18  /  AlkPhos  88      PT/INR - ( 13 May 2018 18:51 )   PT: 11.5 sec;   INR: 1.04 ratio    PTT - ( 13 May 2018 18:51 )  PTT:31.5 sec    Urinalysis Basic - ( 13 May 2018 21:43 )  Color: Yellow / Appearance: Slightly Turbid / S.010 / pH: x  Gluc: x / Ketone: Negative  / Bili: Negative / Urobili: Negative mg/dL   Blood: x / Protein: 500 mg/dL / Nitrite: Negative   Leuk Esterase: Trace / RBC: >50 /HPF / WBC 6-10   Sq Epi: x / Non Sq Epi: Occasional / Bacteria: Occasional    Hemoglobin A1C, Whole Blood: 7.5 % ( @ 11:12)    Serum Pro-Brain Natriuretic Peptide: 32126 pg/mL ( @ 18:51)    Albumin, Serum: 3.0 g/dL ( @ 18:51)      CARDIOVASCULAR:  amLODIPine   Tablet 5 milliGRAM(s) Oral daily  carvedilol 12.5 milliGRAM(s) Oral every 12 hours  cloNIDine 0.3 milliGRAM(s) Oral every 12 hours  hydrALAZINE 50 milliGRAM(s) Oral every 8 hours  hydrochlorothiazide 25 milliGRAM(s) Oral daily  losartan 100 milliGRAM(s) Oral daily    NEUROLOGIC:  acetaminophen   Tablet. 650 milliGRAM(s) Oral every 6 hours PRN    HEMATOLOGIC:  aspirin enteric coated 81 milliGRAM(s) Oral daily  enoxaparin Injectable 30 milliGRAM(s) SubCutaneous daily    GATROINTESTINAL:  docusate sodium 100 milliGRAM(s) Oral daily PRN  senna 2 Tablet(s) Oral at bedtime PRN    ENDO/METABOLIC:  dextrose 40% Gel 15 Gram(s) Oral once PRN  dextrose 50% Injectable 12.5 Gram(s) IV Push once  dextrose 50% Injectable 25 Gram(s) IV Push once  dextrose 50% Injectable 25 Gram(s) IV Push once  glucagon  Injectable 1 milliGRAM(s) IntraMuscular once PRN  insulin glargine Injectable (LANTUS) 15 Unit(s) SubCutaneous at bedtime  insulin lispro (HumaLOG) corrective regimen sliding scale   SubCutaneous three times a day before meals  simvastatin 40 milliGRAM(s) Oral at bedtime    IV FLUID/NUTRITION:  dextrose 5%. 1000 milliLiter(s) IV Continuous <Continuous>  ferrous    sulfate 325 milliGRAM(s) Oral daily      RADIOLOGY & ADDITIONAL TESTS:

## 2018-05-14 NOTE — PROGRESS NOTE ADULT - SUBJECTIVE AND OBJECTIVE BOX
CARDIOLOGY PROGRESS NOTE   ([facility])                                                                                                        Subjective:     No new c/o  Alert Awake    Vitals:  T(C): 36.1 (05-14-18 @ 07:51), Max: 36.4 (05-13-18 @ 17:36)  HR: 68 (05-14-18 @ 07:51) (67 - 74)  BP: 118/61 (05-14-18 @ 07:51) (112/74 - 135/80)  RR: 18 (05-14-18 @ 07:51) (16 - 18)  SpO2: 96% (05-14-18 @ 07:51) (96% - 99%)  Wt(kg): --  I&O's Summary    Height (cm): 165.1 (05-13 @ 17:36)  Weight (kg): 66.2 (05-13 @ 17:36)  BMI (kg/m2): 24.3 (05-13 @ 17:36)  BSA (m2): 1.73 (05-13 @ 17:36)    PHYSICAL EXAM:  Appearance: Normal	  HEENT:   Atraumatic  Cardiovascular: Normal S1 S2, No JVD, No murmurs, No edema  Respiratory: Lungs clear to auscultation	  Gastrointestinal:  Soft, Non-tender, + BS	  Skin: No rashes, No ecchymoses, No cyanosis  Neurologic: Alert and awake, able to move extremities  Extremities: No edema    TELEMETRY: 	        CURRENT MEDICATIONS:  amLODIPine   Tablet 5 milliGRAM(s) Oral daily  carvedilol 12.5 milliGRAM(s) Oral every 12 hours  cloNIDine 0.3 milliGRAM(s) Oral every 12 hours  hydrALAZINE 50 milliGRAM(s) Oral every 8 hours  hydrochlorothiazide 25 milliGRAM(s) Oral daily  losartan 100 milliGRAM(s) Oral daily        acetaminophen   Tablet. 650 milliGRAM(s) Oral every 6 hours PRN    docusate sodium 100 milliGRAM(s) Oral daily PRN  senna 2 Tablet(s) Oral at bedtime PRN    dextrose 40% Gel 15 Gram(s) Oral once PRN  dextrose 50% Injectable 12.5 Gram(s) IV Push once  dextrose 50% Injectable 25 Gram(s) IV Push once  dextrose 50% Injectable 25 Gram(s) IV Push once  glucagon  Injectable 1 milliGRAM(s) IntraMuscular once PRN  insulin glargine Injectable (LANTUS) 20 Unit(s) SubCutaneous at bedtime  insulin lispro (HumaLOG) corrective regimen sliding scale   SubCutaneous three times a day before meals  insulin lispro Injectable (HumaLOG) 6 Unit(s) SubCutaneous three times a day before meals  simvastatin 40 milliGRAM(s) Oral at bedtime    aspirin enteric coated 81 milliGRAM(s) Oral daily  dextrose 5%. 1000 milliLiter(s) IV Continuous <Continuous>  enoxaparin Injectable 30 milliGRAM(s) SubCutaneous daily  ferrous    sulfate 325 milliGRAM(s) Oral daily      LABS:	 	                            10.1   4.0   )-----------( 225      ( 14 May 2018 07:07 )             30.6     05-14    128<L>  |  90<L>  |  54.0<H>  ----------------------------<  390<H>  4.7   |  21.0<L>  |  4.77<H>    Ca    9.0      14 May 2018 07:07  Phos  4.0     05-14  Mg     2.2     05-14    TPro  6.7  /  Alb  3.0<L>  /  TBili  0.3<L>  /  DBili  x   /  AST  52<H>  /  ALT  18  /  AlkPhos  88  05-13    proBNP: Serum Pro-Brain Natriuretic Peptide: 27449 pg/mL (05-13 @ 18:51)    Lipid Profile:   HgA1c: Hemoglobin A1C, Whole Blood: 8.5 % (05-14 @ 07:07)    TSH: Thyroid Stimulating Hormone, Serum: 7.04 uIU/mL (05-14 @ 07:07)

## 2018-05-15 LAB
ANION GAP SERPL CALC-SCNC: 13 MMOL/L — SIGNIFICANT CHANGE UP (ref 5–17)
BUN SERPL-MCNC: 29 MG/DL — HIGH (ref 8–20)
CALCIUM SERPL-MCNC: 9 MG/DL — SIGNIFICANT CHANGE UP (ref 8.6–10.2)
CHLORIDE SERPL-SCNC: 98 MMOL/L — SIGNIFICANT CHANGE UP (ref 98–107)
CO2 SERPL-SCNC: 25 MMOL/L — SIGNIFICANT CHANGE UP (ref 22–29)
CREAT SERPL-MCNC: 3.13 MG/DL — HIGH (ref 0.5–1.3)
GLUCOSE BLDC GLUCOMTR-MCNC: 102 MG/DL — HIGH (ref 70–99)
GLUCOSE BLDC GLUCOMTR-MCNC: 127 MG/DL — HIGH (ref 70–99)
GLUCOSE BLDC GLUCOMTR-MCNC: 219 MG/DL — HIGH (ref 70–99)
GLUCOSE BLDC GLUCOMTR-MCNC: 278 MG/DL — HIGH (ref 70–99)
GLUCOSE BLDC GLUCOMTR-MCNC: 60 MG/DL — LOW (ref 70–99)
GLUCOSE BLDC GLUCOMTR-MCNC: 61 MG/DL — LOW (ref 70–99)
GLUCOSE SERPL-MCNC: 96 MG/DL — SIGNIFICANT CHANGE UP (ref 70–115)
HBV SURFACE AG SER-ACNC: SIGNIFICANT CHANGE UP
HCT VFR BLD CALC: 30.3 % — LOW (ref 37–47)
HGB BLD-MCNC: 9.8 G/DL — LOW (ref 12–16)
MAGNESIUM SERPL-MCNC: 1.9 MG/DL — SIGNIFICANT CHANGE UP (ref 1.6–2.6)
MCHC RBC-ENTMCNC: 28.3 PG — SIGNIFICANT CHANGE UP (ref 27–31)
MCHC RBC-ENTMCNC: 32.3 G/DL — SIGNIFICANT CHANGE UP (ref 32–36)
MCV RBC AUTO: 87.6 FL — SIGNIFICANT CHANGE UP (ref 81–99)
PHOSPHATE SERPL-MCNC: 2.9 MG/DL — SIGNIFICANT CHANGE UP (ref 2.4–4.7)
PLATELET # BLD AUTO: 252 K/UL — SIGNIFICANT CHANGE UP (ref 150–400)
POTASSIUM SERPL-MCNC: 4 MMOL/L — SIGNIFICANT CHANGE UP (ref 3.5–5.3)
POTASSIUM SERPL-SCNC: 4 MMOL/L — SIGNIFICANT CHANGE UP (ref 3.5–5.3)
RBC # BLD: 3.46 M/UL — LOW (ref 4.4–5.2)
RBC # FLD: 15.1 % — SIGNIFICANT CHANGE UP (ref 11–15.6)
SODIUM SERPL-SCNC: 136 MMOL/L — SIGNIFICANT CHANGE UP (ref 135–145)
WBC # BLD: 5.6 K/UL — SIGNIFICANT CHANGE UP (ref 4.8–10.8)
WBC # FLD AUTO: 5.6 K/UL — SIGNIFICANT CHANGE UP (ref 4.8–10.8)

## 2018-05-15 PROCEDURE — 99233 SBSQ HOSP IP/OBS HIGH 50: CPT

## 2018-05-15 PROCEDURE — 93990 DOPPLER FLOW TESTING: CPT | Mod: 26

## 2018-05-15 PROCEDURE — 99233 SBSQ HOSP IP/OBS HIGH 50: CPT | Mod: GC

## 2018-05-15 RX ORDER — CYCLOBENZAPRINE HYDROCHLORIDE 10 MG/1
5 TABLET, FILM COATED ORAL THREE TIMES A DAY
Qty: 0 | Refills: 0 | Status: DISCONTINUED | OUTPATIENT
Start: 2018-05-15 | End: 2018-05-16

## 2018-05-15 RX ORDER — INSULIN GLARGINE 100 [IU]/ML
16 INJECTION, SOLUTION SUBCUTANEOUS AT BEDTIME
Qty: 0 | Refills: 0 | Status: DISCONTINUED | OUTPATIENT
Start: 2018-05-15 | End: 2018-05-16

## 2018-05-15 RX ORDER — INSULIN LISPRO 100/ML
3 VIAL (ML) SUBCUTANEOUS
Qty: 0 | Refills: 0 | Status: DISCONTINUED | OUTPATIENT
Start: 2018-05-15 | End: 2018-05-16

## 2018-05-15 RX ADMIN — Medication 325 MILLIGRAM(S): at 12:17

## 2018-05-15 RX ADMIN — SIMVASTATIN 40 MILLIGRAM(S): 20 TABLET, FILM COATED ORAL at 21:18

## 2018-05-15 RX ADMIN — Medication 6: at 12:15

## 2018-05-15 RX ADMIN — Medication 8 UNIT(S): at 12:18

## 2018-05-15 RX ADMIN — Medication 81 MILLIGRAM(S): at 12:17

## 2018-05-15 RX ADMIN — INSULIN GLARGINE 16 UNIT(S): 100 INJECTION, SOLUTION SUBCUTANEOUS at 21:16

## 2018-05-15 RX ADMIN — Medication 0.6 MILLIGRAM(S): at 12:17

## 2018-05-15 RX ADMIN — ENOXAPARIN SODIUM 30 MILLIGRAM(S): 100 INJECTION SUBCUTANEOUS at 12:17

## 2018-05-15 NOTE — PHYSICAL THERAPY INITIAL EVALUATION ADULT - MANUAL MUSCLE TESTING RESULTS, REHAB EVAL
right shoulder flex 4-/5, elbow flex 4-/5...left shoulder flex 3-/5, elbow flex 3+/5...bilateral hip flex 3+/5, knee ext 4-/5, ankle df 4-/5

## 2018-05-15 NOTE — PHYSICAL THERAPY INITIAL EVALUATION ADULT - ADDITIONAL COMMENTS
pt states she lives with her  and son in a house with 4 steps to enter (+right rail). she states her  is not working now because he is caring for her.  she says he assists her to stand because of arm weakness, then she amb w/RW by herself. he also assists her on the stairs and that she is never alone.

## 2018-05-15 NOTE — PHYSICAL THERAPY INITIAL EVALUATION ADULT - CRITERIA FOR SKILLED THERAPEUTIC INTERVENTIONS
functional limitations in following categories/impairments found/anticipated equipment needs at discharge/rehab potential/anticipated discharge recommendation/risk reduction/prevention/therapy frequency

## 2018-05-15 NOTE — PHYSICAL THERAPY INITIAL EVALUATION ADULT - LIGHT TOUCH SENSATION, LUE, REHAB EVAL
c/o numbness and tingling she has had for awhile. pt states it's from "this thing they put in my arm"/moderate impairment

## 2018-05-15 NOTE — PROGRESS NOTE ADULT - SUBJECTIVE AND OBJECTIVE BOX
Patient seen and examined    Feels fine  no c/o CP SOB NV   No swelling feet  appetite modest    Vital Signs Last 24 Hrs  T(C): 36.8 (15 May 2018 11:20), Max: 37 (14 May 2018 23:17)  T(F): 98.3 (15 May 2018 11:20), Max: 98.6 (14 May 2018 23:17)  HR: 81 (15 May 2018 11:20) (73 - 81)  BP: 125/67 (15 May 2018 11:20) (119/65 - 131/75)  BP(mean): --  RR: 23 (15 May 2018 11:20) (17 - 23)  SpO2: 95% (15 May 2018 11:20) (95% - 98%)    PHYSICAL EXAM    GENERAL: NAD,   EYES:  conjunctiva and sclera clear  NECK: Supple, No JVD/Bruit  NERVOUS SYSTEM:  A/O x3,   CHEST:  No rales, No rhonchi  HEART:  RRR, No murmur, no rub  ABDOMEN: Soft, NT/ND BS+  EXTREMITIES:  No Edema;  SKIN: No rashes    15 May 2018 07:49    136    |  98     |  29.0   ----------------------------<  96     4.0     |  25.0   |  3.13     Ca    9.0        15 May 2018 07:49  Phos  2.9       15 May 2018 07:49  Mg     1.9       15 May 2018 07:49    TPro  6.7    /  Alb  3.0    /  TBili  0.3    /  DBili  x      /  AST  52     /  ALT  18     /  AlkPhos  88     13 May 2018 18:51                          9.8    5.6   )-----------( 252      ( 15 May 2018 07:49 )             30.3       ECHO shows trace pericardial eff cf 3/18 - showed moderate  now appears to have Atypical Cp  being treated as Pericarditis; EP noted; ? eventual Watchman procedure  HD am  Continue present treatment

## 2018-05-15 NOTE — PROGRESS NOTE ADULT - ASSESSMENT
58F  hx HTN, HLD, DMII on insulin, ESRD on HD M/W/F, hyponatremia with recent admission March 2018 with CP and SOB and had MARTINEZ including  LHC 3/2018 mild irregularity of coronary anatomy and EF 55; Echo  3/2018: EF 60-65. DD 1. Moderate peric eff. Questionable llambl's visualized on the aortic valve.  She was found GI bleed profound anemia , ? CAP and newly dx AF and was evaluated for Watchman by EP (dr. Garland) given the need for systemic OAC given elevated [id number] score EP consulted for possible KELLI occlusion device.  Presented to ED with CP.       1) Chest Pain: Resolved  Pericardial Effusion/Pericarditis  Limited Echo:  Trivial pericardial effusion.  EF appears less in the repeat Echo, will repeat as outpt:  Left ventricular ejection fraction, by visual estimation, is 45 to 50%.  Benefited from NSAIDs and Colchicine     2) Abnl Trop: In setting of ESRD on HD with elevated Pro BNP 58,497 and CK 1,299, trop 1.9 and CXR showing cardiomegalia and right side pleural effusion, and given a recent cardiac cath 3/23/18 with clear coronaries and EF 60-65%.     3) Acute on Chronic Diastolic HF with no ischemia on EKG.  Volume control with HD.    4) HTN: Controlled at present C/W out patient meds     3) DM: Per PCT     5) ESRD on HD:Cont HD    6) Right Pleural Effusion: Per PCT/CTS    7) Afib: d/w EP. Outpt FU with consideration of Watchman recommended as outpt, no AC.     d/c planning

## 2018-05-15 NOTE — PROGRESS NOTE ADULT - SUBJECTIVE AND OBJECTIVE BOX
CARDIOLOGY PROGRESS NOTE   ([facility])                                                                                                        Subjective:     feels well.  Dneied CP.  laying flat in the bed.  No orthopnea.      Vitals:  T(C): 36.8 (05-15-18 @ 11:20), Max: 37 (18 @ 23:17)  HR: 81 (05-15-18 @ 11:20) (73 - 81)  BP: 125/67 (05-15-18 @ 11:20) (119/65 - 131/75)  RR: 23 (05-15-18 @ 11:20) (17 - 23)  SpO2: 95% (05-15-18 @ 11:20) (95% - 98%)  Wt(kg): --  I&O's Summary    14 May 2018 07:01  -  15 May 2018 07:00  --------------------------------------------------------  IN: 0 mL / OUT: 0 mL / NET: 0 mL          PHYSICAL EXAM:  Appearance: Normal	  HEENT:   Atraumatic  Cardiovascular: Normal S1 S2, No JVD, No murmurs, No edema  Respiratory: Lungs clear to auscultation	  Gastrointestinal:  Soft, Non-tender, + BS	  Skin: No rashes, No ecchymoses, No cyanosis  Neurologic: Alert and awake, able to move extremities  Extremities: No edema      	      CURRENT MEDICATIONS:        acetaminophen   Tablet. 650 milliGRAM(s) Oral every 6 hours PRN  cyclobenzaprine 5 milliGRAM(s) Oral three times a day PRN  ibuprofen  Tablet 400 milliGRAM(s) Oral every 6 hours PRN    docusate sodium 100 milliGRAM(s) Oral daily PRN  senna 2 Tablet(s) Oral at bedtime PRN    colchicine 0.6 milliGRAM(s) Oral daily  dextrose 40% Gel 15 Gram(s) Oral once PRN  dextrose 50% Injectable 12.5 Gram(s) IV Push once  dextrose 50% Injectable 25 Gram(s) IV Push once  dextrose 50% Injectable 25 Gram(s) IV Push once  glucagon  Injectable 1 milliGRAM(s) IntraMuscular once PRN  insulin glargine Injectable (LANTUS) 16 Unit(s) SubCutaneous at bedtime  insulin lispro (HumaLOG) corrective regimen sliding scale   SubCutaneous three times a day before meals  insulin lispro Injectable (HumaLOG) 8 Unit(s) SubCutaneous three times a day before meals  simvastatin 40 milliGRAM(s) Oral at bedtime    aspirin enteric coated 81 milliGRAM(s) Oral daily  dextrose 5%. 1000 milliLiter(s) IV Continuous <Continuous>  enoxaparin Injectable 30 milliGRAM(s) SubCutaneous daily  ferrous    sulfate 325 milliGRAM(s) Oral daily      LABS:	 	    CARDIAC MARKERS:  Troponin I:   CPK total =  CK MB=   CKMB index=                           9.8    5.6   )-----------( 252      ( 15 May 2018 07:49 )             30.3     05-15    136  |  98  |  29.0<H>  ----------------------------<  96  4.0   |  25.0  |  3.13<H>    Ca    9.0      15 May 2018 07:49  Phos  2.9     05-15  Mg     1.9     05-15    TPro  6.7  /  Alb  3.0<L>  /  TBili  0.3<L>  /  DBili  x   /  AST  52<H>  /  ALT  18  /  AlkPhos  88      proBNP: Serum Pro-Brain Natriuretic Peptide: 99267 pg/mL ( @ 18:51)    Lipid Profile:   HgA1c:   TSH: Thyroid Stimulating Hormone, Serum: 7.04 uIU/mL ( @ 07:07)      EXAM:  ECHO TRANSTHORACIC;F U OR LTD      PROCEDURE DATE:  May 14 2018   .      INTERPRETATION:  REPORT:    TRANSTHORACIC ECHOCARDIOGRAM REPORT         Patient Name:   JAROCHO MORALES Patient Location: [facility] Rec #:  [id number]     Accession #:      [id number]  Account #:                   Height:           65.0 in 165.0 cm  YOB: 1960     Weight:           145.9 lb 66.20 kg  Patient Age:    58 years     BSA:              1.73 m²  Patient Gender: F            BP:               118/74 mmHg       Date of Exam:        2018 11:09:02 AM  Sonographer:         Alexandra Thompson  Referring Physician: Wesly Garcia MD    Procedure:   Follow up or Limited Echocardiogram.  Indications: Pericardial effusion (noninflammatory) - I31.3  Diagnosis:   Pericardial effusion (noninflammatory) - I31.3         2D AND M-MODE MEASUREMENTS (normal ranges within parentheses):  Left Ventricle:                 Normal    Aorta/Left Atrium:              Normal  IVSd (2D):              1.07 cm (0.7-1.1) LA Volume Index    38.7 ml/m²  LVPWd (2D):             0.95 cm (0.7-1.1)  LVIDd (2D):             4.65 cm (3.4-5.7)  Relative Wall Thickness 0.41    (<0.42)    LV SYSTOLIC FUNCTION BY 2D PLANIMETRY (MOD):  EF-A4C View: 53.4 % EF-A2C View: 40.6 % EF-Biplane: 45 %    LV DIASTOLIC FUNCTION:  MV Peak E: 0.81 m/s e', MV Josselin: 0.03 m/s  MV Peak A: 1.10 m/s E/e' Ratio: 25.95  E/A Ratio: 0.74    SPECTRAL DOPPLER ANALYSIS (where applicable):  Aortic Valve: AoV Max Darius: 1.60 m/s AoV Peak PG: 10.2 mmHg AoV Mean P.4 mmHg    LVOT Vmax: 0.98 m/s LVOT VTI: 0.179 m LVOT Diameter:    AoV Area, Vmax:  AoV Area, VTI:  AoV Area, Vmn:  Ao VTI: 0.304     PHYSICIAN INTERPRETATION:  Left Ventricle: The left ventricular internal cavity size is normal. Left   ventricular wall thickness is normal.  Global LV systolic function was mildly decreased. Left ventricular   ejection fraction, by visual estimation, is 45 to 50%. Spectral Doppler   shows impaired relaxation pattern of left ventricular myocardial filling   (Grade I diastolic dysfunction).  Right Ventricle: Normal right ventricular size and function.  Left Atrium: Mildly enlarged left atrium.  Right Atrium: The right atrium is normal in size.  Pericardium: Trivial pericardial effusion is present. There is a moderate   pleural effusion in both left and right lateral regions.  Mitral Valve: Thickening of the anterior and posterior mitral valve   leaflets. Mild mitral valve regurgitation is seen.  Tricuspid Valve: Structurally normal tricuspid valve, with normal leaflet   excursion. Trivial tricuspid regurgitation is visualized. Adequate TR   velocity was not obtained to accurately assess RVSP.  Aortic Valve: The aortic valve is trileaflet. No evidence of aortic valve   regurgitation is seen.  Pulmonic Valve: Structurally normal pulmonic valve, with normal leaflet   excursion. Trace pulmonic valve regurgitation.  Aorta: The aorta was not well visualized.  Pulmonary Artery: The main pulmonary artery is normal in size.  Venous: The inferior vena cava was normal sized, with respiratory size   variation greater than 50%.       Summary:   1. Left ventricular ejection fraction, by visual estimation, is 45 to   50%.   2. Mildly decreased global left ventricular systolic function.   3. Spectral Doppler shows impaired relaxation pattern of left   ventricular myocardial filling (Grade I diastolic dysfunction).   4. Moderate pleural effusion in both left and right lateral regions.   5. Trivial pericardial effusion.   6. Thickening of the anterior and posterior mitral valve leaflets.   7. Limited follow up echo for pericardial effusion. Echo done on 3/4/18   moderate pericardial effusion seen.

## 2018-05-15 NOTE — PHYSICAL THERAPY INITIAL EVALUATION ADULT - ACTIVE RANGE OF MOTION EXAMINATION, REHAB EVAL
Right UE Active ROM was WNL (within normal limits)/left UE-see MMT below./bilateral lower extremity Active ROM was WNL (within normal limits)

## 2018-05-15 NOTE — PROGRESS NOTE ADULT - ASSESSMENT
Ms. Elizabeth is a 57yo F with PMH HTN, HLD, DMII not on insulin at home, ESRD on HD M/W/F, hyponatremia with recent admission March 29-April 3 for vomiting and stomach pain. This admission is for chest pain.     Chest pain likely atypical in nature   - TTE revealing minimal pericarditis  - c/w colchicine and Ibuprofen PRN pain  - There could be a MSK component with pain from AVF placement  - Cardiology recommendations appreciated      Elevated Troponins  - s/p LHC on 5/14 with no intervention  - Aspirin, statin       ESRD on hemodialysis.   - HD not completed yesterday due to drop in BP, patient not currently in overt fluid overload   - M/W/F  - Renal Diet  - C/W Epogen.  - US showign patent AV fistula   - Nephrology consuklt appreciated      Hyperglycemia due to type 2 diabetes mellitus-recently admitted and on insulin, kept on oral regimen on discharge with sliding scale only. With presenting hyponatremia which is likely worsened with recently elevated c/s   - Chronic DMII poorly controlled with insulin   - c/w Lantus 20 and pre-meals at 8 units  - Accuchecks before meals and at bedtime  - C/W Moderate Sliding Scale (12 units given before snack)  - A1c 8.5, recent one noted at 7.5     PAF  -  Currently in NSR   - f/u EP     Lung effusion chronic since prior admission with routine dialysis    - CXR shows improvement in L lower lobe vs March film.   -no evidence of SOB on exam      Abnormal TFTs - noted on last admission. Elevated TSH with normal T4 and Low T3. Cannot definitely say she is subclinical. Will repeat TFTs and consider adding a low dose of synthroid given patients overall status     Anemia due to chronic kidney disease.   - Improved Hb 10.4 vs 8.4 6 weeks earlier  - Renal will continue with Epogen.      Hypertension.   - C/W home meds w/parameters  - Amlodipine 5mg daily  - Clonidine 0.3mg Q12h  - Carvedilol 12.5 Q12h  - Hydralazine 50mg Q8h  - Hydrochlorothizide 25mg daily  - Losartan 100mg daily.     Need for prophylactic measure.    - Lovenox renally dosed. Ms. Elizabeth is a 57yo F with PMH HTN, HLD, DMII not on insulin at home, ESRD on HD M/W/F, hyponatremia with recent admission March 29-April 3 for vomiting and stomach pain. This admission is for chest pain.     Chest pain likely atypical in nature   - TTE revealing minimal pericarditis  - c/w colchicine and Ibuprofen PRN pain  - There could be a MSK component with pain from AVF placement  - Cardiology recommendations appreciated      Elevated Troponins  - down-trending form 0.19 to 0.16  - likely elevated in setting of ESRD as no EKG chnages and given recent cardiac cath 3/23/18 with clear coronaries and EF 60-65%.   - repeat TTE with EF 45-50%  - Aspirin, statin       ESRD on hemodialysis.   - HD not completed yesterday due to drop in BP, patient not currently in overt fluid overload   - M/W/F  - Renal Diet  - C/W Epogen.  - US showign patent AV fistula   - Nephrology consuklt appreciated      Hyperglycemia due to type 2 diabetes mellitus-recently admitted and on insulin, kept on oral regimen on discharge with sliding scale only. With presenting hyponatremia which is likely worsened with recently elevated c/s   - Chronic DMII poorly controlled with insulin   - c/w Lantus 20 and pre-meals at 8 units  - Accuchecks before meals and at bedtime  - C/W Moderate Sliding Scale (12 units given before snack)  - A1c 8.5, recent one noted at 7.5     PAF  -  Currently in NSR   - f/u EP     Lung effusion chronic since prior admission with routine dialysis    - CXR shows improvement in L lower lobe vs March film.   -no evidence of SOB on exam      Abnormal TFTs - noted on last admission. Elevated TSH with normal T4 and Low T3. Cannot definitely say she is subclinical. Will repeat TFTs and consider adding a low dose of synthroid given patients overall status     Anemia due to chronic kidney disease.   - Improved Hb 10.4 vs 8.4 6 weeks earlier  - Renal will continue with Epogen.      Hypertension.   - C/W home meds w/parameters  - Amlodipine 5mg daily  - Clonidine 0.3mg Q12h  - Carvedilol 12.5 Q12h  - Hydralazine 50mg Q8h  - Hydrochlorothizide 25mg daily  - Losartan 100mg daily.     Need for prophylactic measure.    - Lovenox renally dosed. Ms. Elizabeth is a 59yo F with PMH HTN, HLD, DMII not on insulin at home, ESRD on HD M/W/F, hyponatremia with recent admission March 29-April 3 for vomiting and stomach pain. This admission is for chest pain.     Chest pain likely atypical in nature   - TTE revealing minimal pericarditis  - c/w colchicine and Ibuprofen PRN pain  - There could be a MSK component with pain from AVF placement  - Cardiology recommendations appreciated      Elevated Troponins  - down-trending form 0.19 to 0.16  - likely elevated in setting of ESRD as no EKG chnages and given recent cardiac cath 3/23/18 with clear coronaries and EF 60-65%.   - repeat TTE with EF 45-50%  - Aspirin, statin       ESRD on hemodialysis.   - HD not completed yesterday due to drop in BP, patient not currently in overt fluid overload   - M/W/F  - Renal Diet  - C/W Epogen.  - US showign patent AV fistula   - Nephrology consuklt appreciated      Hyperglycemia due to type 2 diabetes mellitus-recently admitted and on insulin, kept on oral regimen on discharge with sliding scale only. With presenting hyponatremia which is likely worsened with recently elevated c/s   - Chronic DMII poorly controlled with insulin   - Will decrease Lantus to 16 untis given AM FS of <100 and pre-meals at 8 units  - Accuchecks before meals and at bedtime  - C/W Moderate Sliding Scale (12 units given before snack)  - A1c 8.5, recent one noted at 7.5     PAF  -  Currently in NSR   - f/u EP     Lung effusion chronic since prior admission with routine dialysis    - CXR shows improvement in L lower lobe vs March film.   -no evidence of SOB on exam      Abnormal TFTs - noted on last admission. Elevated TSH with normal T4 and Low T3. Cannot definitely say she is subclinical. Will repeat TFTs and consider adding a low dose of synthroid given patients overall status     Anemia due to chronic kidney disease.   - Improved Hb 10.4 vs 8.4 6 weeks earlier  - Renal will continue with Epogen.      Hypertension.   - C/W home meds w/parameters  - Amlodipine 5mg daily  - Clonidine 0.3mg Q12h  - Carvedilol 12.5 Q12h  - Hydralazine 50mg Q8h  - Hydrochlorothizide 25mg daily  - Losartan 100mg daily.     Need for prophylactic measure.    - Lovenox renally dosed.

## 2018-05-15 NOTE — PHYSICAL THERAPY INITIAL EVALUATION ADULT - PASSIVE RANGE OF MOTION EXAMINATION, REHAB EVAL
left shoulder, elbow WNL/bilateral lower extremity Passive ROM was WNL/Right UE Passive ROM was WNL (within normal limits)

## 2018-05-15 NOTE — PHYSICAL THERAPY INITIAL EVALUATION ADULT - PERTINENT HX OF CURRENT PROBLEM, REHAB EVAL
Ms. Elizabeth is a 59yo F with PMH HTN, HLD, DMII on insulin, ESRD on HD M/W/F (started on last admission here in April) with Right sided PC and left side AVF placed (pending maturation), hyponatremia with recent admission March 29-April 3 for vomiting and stomach pain who presents with 3 days of intermittent chest pain. The chest pain radiates to the left shoulder per patient and is associated with some palpitations and dizziness.

## 2018-05-15 NOTE — PATIENT PROFILE ADULT. - PMH
DM (diabetes mellitus) DM (diabetes mellitus)    End stage renal disease on dialysis    HLD (hyperlipidemia)    HTN (hypertension)    Pericardial effusion    Pleural effusion

## 2018-05-15 NOTE — PROGRESS NOTE ADULT - SUBJECTIVE AND OBJECTIVE BOX
CC: Patient is a 58y old  Female who presents with a chief complaint of Chest Pain admitted for pericarditis and r/o ACS (13 May 2018 22:01)    Patient seen and examined at bedside, No acute overnight events. Patient this AM complaining of moderate suprapubic pain radiating to the right flank.  Patient denies any burning on urination, nausea, vomiting, or other complaintsat this time. Patient getting OOB, eating well, voiding and last BM 2 days ago.  Cardiac monitor reviewed - no acute events    ROS: Denies fever, chest pain, SOB, diarrhea, constipation, calf pain.    VS:   Vital Signs Last 24 Hrs  T(C): 36.9 (15 May 2018 05:30), Max: 37 (14 May 2018 23:17)  T(F): 98.4 (15 May 2018 05:30), Max: 98.6 (14 May 2018 23:17)  HR: 81 (15 May 2018 07:10) (70 - 81)  BP: 128/70 (15 May 2018 07:10) (98/45 - 131/75)  RR: 18 (15 May 2018 07:10) (16 - 18)  SpO2: 98% (15 May 2018 07:10) (94% - 98%)      Physical Exam:   Gen: NAD  HEENT: NCAT, EOMI, PERRLA,   CVS: RRR, +S1/S2, no murmurs, rubs or gallops appreciated  Lungs: CTAB, no wheeze, rales, rhonchi  Abdomen: +BS, soft, ND, Mild tenderness in suprapubic region, no palpable flank tenderness or mass, no CVA tenderness  Ext: No cyanosis, edema or calf tenderness  Neuro: AAOx3, no focal deficits.    Labs:                        10.1   4.0   )-----------( 225      ( 14 May 2018 07:07 )             30.6   05-14    128<L>  |  90<L>  |  54.0<H>  ----------------------------<  390<H>  4.7   |  21.0<L>  |  4.77<H>    Ca    9.0      14 May 2018 07:07  Phos  4.0     05-14  Mg     2.2     05-14    TPro  6.7  /  Alb  3.0<L>  /  TBili  0.3<L>  /  DBili  x   /  AST  52<H>  /  ALT  18  /  AlkPhos  88  05-13 05-14 @ 07:01  -  05-15 @ 07:00  --------------------------------------------------------  IN: 0 mL / OUT: 0 mL / NET: 0 mL    Troponin 0.19-->0.16-->0.16    Radiology:  < from: US Duplex Venous Lower Ext Complete, Bilateral (05.13.18 @ 19:53) >  IMPRESSION:     No evidence of bilateral lower extremity deep venous thrombosis.    < end of copied text >    < from: Xray Chest 2 Views PA/Lat (05.13.18 @ 18:59) >  IMPRESSION:    Support Devices: Interval placement of the right dialysis catheter with   the tip in the SVC.  Heart: Cardiomegaly  Mediastinum: Normal in contour  Lungs/Airways: Moderate right and small left pleural effusion with or   without underlying airspace disease, grossly stable. No pneumothorax.  Musculoskeletal: Normal      < from: Cardiac Cath Lab - Adult (03.23.18 @ 13:53) >  VENTRICLES: There were no left ventricular global or regional wall motion  abnormalities. Global left ventricular function was normal. EF calculated  by contrast ventriculography was 55 %.  VALVES: AORTIC VALVE: No significant aortic valve gradient. MITRAL VALVE:  The mitral valve exhibited trivial regurgitation (less than 1+).  CORONARY VESSELS: The coronary circulation is right dominant.  LM:   --  LM: Normal. The vessel was normal sized, not calcified, and not  tortuous. Angiography showed no evidence of disease.  LAD:   --  LAD: Normal. The vessel was normal sized, not calcified, and not  tortuous. Angiography showed minor luminal irregularities with no flow  limiting lesions.  CX:   --  Circumflex: Normal. The vessel was normal sized, not calcified,  and excessively tortuous.Angiography showed no evidence of disease.  RCA:   --  RCA: Normal. The vessel was normal sized, not calcified, and  moderately tortuous. Angiography showed no evidence of disease.  COMPLICATIONS: There were no complications. No complications occurred  during the cath lab visit.  DIAGNOSTIC IMPRESSIONS: There is mild irregularity of the coronary anatomy.  Left ventricular function is normal (LVEF=55%).  DIAGNOSTIC RECOMMENDATIONS: The patient should continue with the present  medications. Patientmanagement should include aggressive medical therapy  and resumption of all previous activities in 2 days.    < end of copied text >      Medications:  MEDICATIONS  (STANDING):  aspirin enteric coated 81 milliGRAM(s) Oral daily  colchicine 0.6 milliGRAM(s) Oral daily  dextrose 5%. 1000 milliLiter(s) (50 mL/Hr) IV Continuous <Continuous>  dextrose 50% Injectable 12.5 Gram(s) IV Push once  dextrose 50% Injectable 25 Gram(s) IV Push once  dextrose 50% Injectable 25 Gram(s) IV Push once  enoxaparin Injectable 30 milliGRAM(s) SubCutaneous daily  ferrous    sulfate 325 milliGRAM(s) Oral daily  insulin glargine Injectable (LANTUS) 20 Unit(s) SubCutaneous at bedtime  insulin lispro (HumaLOG) corrective regimen sliding scale   SubCutaneous three times a day before meals  insulin lispro Injectable (HumaLOG) 8 Unit(s) SubCutaneous three times a day before meals  simvastatin 40 milliGRAM(s) Oral at bedtime    MEDICATIONS  (PRN):  acetaminophen   Tablet. 650 milliGRAM(s) Oral every 6 hours PRN Mild Pain (1 - 3)  dextrose 40% Gel 15 Gram(s) Oral once PRN Blood Glucose LESS THAN 70 milliGRAM(s)/deciliter  docusate sodium 100 milliGRAM(s) Oral daily PRN for constipation  glucagon  Injectable 1 milliGRAM(s) IntraMuscular once PRN Glucose LESS THAN 70 milligrams/deciliter  ibuprofen  Tablet 400 milliGRAM(s) Oral every 6 hours PRN Moderate pain  senna 2 Tablet(s) Oral at bedtime PRN Constipation

## 2018-05-16 ENCOUNTER — TRANSCRIPTION ENCOUNTER (OUTPATIENT)
Age: 58
End: 2018-05-16

## 2018-05-16 VITALS
SYSTOLIC BLOOD PRESSURE: 146 MMHG | HEART RATE: 99 BPM | RESPIRATION RATE: 20 BRPM | DIASTOLIC BLOOD PRESSURE: 84 MMHG | OXYGEN SATURATION: 94 %

## 2018-05-16 LAB
ALBUMIN SERPL ELPH-MCNC: 2.6 G/DL — LOW (ref 3.3–5.2)
ALP SERPL-CCNC: 93 U/L — SIGNIFICANT CHANGE UP (ref 40–120)
ALT FLD-CCNC: 37 U/L — HIGH
ANION GAP SERPL CALC-SCNC: 17 MMOL/L — SIGNIFICANT CHANGE UP (ref 5–17)
AST SERPL-CCNC: 130 U/L — HIGH
BILIRUB SERPL-MCNC: 0.2 MG/DL — LOW (ref 0.4–2)
BUN SERPL-MCNC: 41 MG/DL — HIGH (ref 8–20)
CALCIUM SERPL-MCNC: 8.8 MG/DL — SIGNIFICANT CHANGE UP (ref 8.6–10.2)
CHLORIDE SERPL-SCNC: 94 MMOL/L — LOW (ref 98–107)
CO2 SERPL-SCNC: 20 MMOL/L — LOW (ref 22–29)
CREAT SERPL-MCNC: 4.18 MG/DL — HIGH (ref 0.5–1.3)
CULTURE RESULTS: SIGNIFICANT CHANGE UP
ERYTHROCYTE [SEDIMENTATION RATE] IN BLOOD: 49 MM/HR — HIGH (ref 0–20)
FERRITIN SERPL-MCNC: 1132 NG/ML — HIGH (ref 15–150)
GLUCOSE BLDC GLUCOMTR-MCNC: 124 MG/DL — HIGH (ref 70–99)
GLUCOSE BLDC GLUCOMTR-MCNC: 162 MG/DL — HIGH (ref 70–99)
GLUCOSE BLDC GLUCOMTR-MCNC: 232 MG/DL — HIGH (ref 70–99)
GLUCOSE BLDC GLUCOMTR-MCNC: 97 MG/DL — SIGNIFICANT CHANGE UP (ref 70–99)
GLUCOSE SERPL-MCNC: 216 MG/DL — HIGH (ref 70–115)
HCT VFR BLD CALC: 31.7 % — LOW (ref 37–47)
HGB BLD-MCNC: 10.3 G/DL — LOW (ref 12–16)
IRON SATN MFR SERPL: 26 % — SIGNIFICANT CHANGE UP (ref 14–50)
IRON SATN MFR SERPL: 42 UG/DL — SIGNIFICANT CHANGE UP (ref 37–145)
IRON SATN MFR SERPL: 42 UG/DL — SIGNIFICANT CHANGE UP (ref 37–145)
MCHC RBC-ENTMCNC: 28.2 PG — SIGNIFICANT CHANGE UP (ref 27–31)
MCHC RBC-ENTMCNC: 32.5 G/DL — SIGNIFICANT CHANGE UP (ref 32–36)
MCV RBC AUTO: 86.8 FL — SIGNIFICANT CHANGE UP (ref 81–99)
PLATELET # BLD AUTO: 253 K/UL — SIGNIFICANT CHANGE UP (ref 150–400)
POTASSIUM SERPL-MCNC: 4 MMOL/L — SIGNIFICANT CHANGE UP (ref 3.5–5.3)
POTASSIUM SERPL-SCNC: 4 MMOL/L — SIGNIFICANT CHANGE UP (ref 3.5–5.3)
PROT SERPL-MCNC: 6.2 G/DL — LOW (ref 6.6–8.7)
RBC # BLD: 3.65 M/UL — LOW (ref 4.4–5.2)
RBC # FLD: 15.2 % — SIGNIFICANT CHANGE UP (ref 11–15.6)
SODIUM SERPL-SCNC: 131 MMOL/L — LOW (ref 135–145)
SPECIMEN SOURCE: SIGNIFICANT CHANGE UP
TIBC SERPL-MCNC: 162 UG/DL — LOW (ref 220–430)
TRANSFERRIN SERPL-MCNC: 113 MG/DL — LOW (ref 192–382)
WBC # BLD: 7.7 K/UL — SIGNIFICANT CHANGE UP (ref 4.8–10.8)
WBC # FLD AUTO: 7.7 K/UL — SIGNIFICANT CHANGE UP (ref 4.8–10.8)

## 2018-05-16 PROCEDURE — 99239 HOSP IP/OBS DSCHRG MGMT >30: CPT

## 2018-05-16 RX ORDER — INSULIN GLARGINE 100 [IU]/ML
16 INJECTION, SOLUTION SUBCUTANEOUS
Qty: 10 | Refills: 0 | OUTPATIENT
Start: 2018-05-16 | End: 2018-07-14

## 2018-05-16 RX ORDER — INSULIN LISPRO 100/ML
3 VIAL (ML) SUBCUTANEOUS
Qty: 10 | Refills: 0 | OUTPATIENT
Start: 2018-05-16 | End: 2018-06-14

## 2018-05-16 RX ORDER — FERROUS SULFATE 325(65) MG
1 TABLET ORAL
Qty: 30 | Refills: 0
Start: 2018-05-16 | End: 2018-06-14

## 2018-05-16 RX ORDER — DOCUSATE SODIUM 100 MG
1 CAPSULE ORAL
Qty: 60 | Refills: 0
Start: 2018-05-16 | End: 2018-06-14

## 2018-05-16 RX ORDER — IBUPROFEN 200 MG
1 TABLET ORAL
Qty: 56 | Refills: 0 | OUTPATIENT
Start: 2018-05-16 | End: 2018-05-29

## 2018-05-16 RX ORDER — COLCHICINE 0.6 MG
1 TABLET ORAL
Qty: 30 | Refills: 2 | OUTPATIENT
Start: 2018-05-16 | End: 2018-08-13

## 2018-05-16 RX ORDER — FERROUS SULFATE 325(65) MG
1 TABLET ORAL
Qty: 0 | Refills: 0 | COMMUNITY

## 2018-05-16 RX ORDER — SIMVASTATIN 20 MG/1
1 TABLET, FILM COATED ORAL
Qty: 0 | Refills: 0 | COMMUNITY

## 2018-05-16 RX ORDER — SENNA PLUS 8.6 MG/1
2 TABLET ORAL
Qty: 60 | Refills: 0
Start: 2018-05-16 | End: 2018-06-14

## 2018-05-16 RX ORDER — ASPIRIN/CALCIUM CARB/MAGNESIUM 324 MG
1 TABLET ORAL
Qty: 30 | Refills: 0
Start: 2018-05-16 | End: 2018-06-14

## 2018-05-16 RX ORDER — LOSARTAN/HYDROCHLOROTHIAZIDE 100MG-25MG
1 TABLET ORAL
Qty: 0 | Refills: 0 | COMMUNITY

## 2018-05-16 RX ADMIN — Medication 81 MILLIGRAM(S): at 12:26

## 2018-05-16 RX ADMIN — Medication 400 MILLIGRAM(S): at 22:10

## 2018-05-16 RX ADMIN — ENOXAPARIN SODIUM 30 MILLIGRAM(S): 100 INJECTION SUBCUTANEOUS at 12:26

## 2018-05-16 RX ADMIN — SIMVASTATIN 40 MILLIGRAM(S): 20 TABLET, FILM COATED ORAL at 21:40

## 2018-05-16 RX ADMIN — Medication 400 MILLIGRAM(S): at 21:40

## 2018-05-16 RX ADMIN — Medication 325 MILLIGRAM(S): at 12:26

## 2018-05-16 RX ADMIN — Medication 650 MILLIGRAM(S): at 09:41

## 2018-05-16 RX ADMIN — INSULIN GLARGINE 16 UNIT(S): 100 INJECTION, SOLUTION SUBCUTANEOUS at 21:44

## 2018-05-16 RX ADMIN — Medication 4: at 12:55

## 2018-05-16 RX ADMIN — Medication 3 UNIT(S): at 09:07

## 2018-05-16 RX ADMIN — Medication 650 MILLIGRAM(S): at 10:31

## 2018-05-16 RX ADMIN — Medication 0.6 MILLIGRAM(S): at 12:26

## 2018-05-16 NOTE — DISCHARGE NOTE ADULT - CARE PROVIDER_API CALL
Cmaille Lopez), Family Medicine  [address] [place] [address]  Phone: [phone]  Fax: [phone]    Srini Gay (MD), Internal Medicine; Nephrology  [address] [place] [id number]  Phone: [phone]  Fax: [phone]    Wesly Garcia), Cardiovascular Disease  [address] [place] [address]  Phone: [phone]  Fax: [phone]    Ming Stephens (MD; MPH), Cardiac Electrophysiology; Cardiovascular Disease; Internal Medicine  [address] [id number]  Phone: [phone]  Fax: [phone]

## 2018-05-16 NOTE — PROGRESS NOTE ADULT - ATTENDING COMMENTS
HD now.
I have personally seen and examined the patient, reviewed the chart, labs and diagnostics and I agree with assessment and plan as above with family medicine resident anette Giordano    Un controlled sugars : Adjusted Insulin regimen 20 U Lantus with 8 u sq tid before meals with SSI. Need Diabetes education consult  Chest pain, atypical : resolved. Appreciate cardiology Input. Trial of colchicine for suspected pericarditis.  ECHO pending    Paroxysmal A fib : Not on anticoagulation because of Bleeding risk and previous GI bleed.
I have seen and examined the patient, reviewed the chart, labs and diagnostics and I agree with assessment and plan as above with family medicine resident Jacqueline Bermeo    Sugars well controlled on current Insulin regimen  Pericarditis: Home on Colchicine   Abnormal TSH last admission : 7.0, with Normal T 4. Recommended Outpatient repeat TSH in 3 weeks with PCP follow up  Stop all Home BP medications, as BP's have been well controlled with out any Home BP medications  Home with Home health care    spoke with family at bed side  spoke with social work and case management regarding discharge planning for today

## 2018-05-16 NOTE — DISCHARGE NOTE ADULT - MEDICATION SUMMARY - MEDICATIONS TO TAKE
I will START or STAY ON the medications listed below when I get home from the hospital:     mg oral tablet  -- 1 tab(s) by mouth every 6 hours, As needed, Moderate pain  -- Indication: For Pericardial effusion    aspirin 81 mg oral delayed release tablet  -- 1 tab(s) by mouth once a day  -- Indication: For CAD    insulin glargine 100 units/mL subcutaneous solution  -- 16 unit(s) subcutaneous once a day (at bedtime)   -- Do not drink alcoholic beverages when taking this medication.  It is very important that you take or use this exactly as directed.  Do not skip doses or discontinue unless directed by your doctor.  Keep in refrigerator.  Do not freeze.    -- Indication: For DM (diabetes mellitus)    HumaLOG 100 units/mL injectable solution  -- 3 unit(s) injectable 3 times a day (before meals)   -- Indication: For DM (diabetes mellitus)    Colcrys 0.6 mg oral tablet  -- 1 tab(s) by mouth once a day  -- Indication: For Pericardial effusion    FeroSul 325 mg (65 mg elemental iron) oral tablet  -- 1 tab(s) by mouth once a day   -- Indication: For Anemia     Colace 100 mg oral capsule  -- 1 cap(s) by mouth 2 times a day, As Needed -for constipation   -- Indication: For Constipation    senna oral tablet  -- 2 tab(s) by mouth once a day (at bedtime), As needed, Constipation  -- Indication: For Constipation

## 2018-05-16 NOTE — CHART NOTE - NSCHARTNOTEFT_GEN_A_CORE
Spoke to evening  who confirmed that patient is ready to be discharged. Home Health care is set up for patient.  states that [facility] confirmed that patient will start home care tomorrow. Patient is medically optimized for discharge at this time. Nurse was called and informed of the above.

## 2018-05-16 NOTE — PROGRESS NOTE ADULT - SUBJECTIVE AND OBJECTIVE BOX
CC: Patient is a 58y old  Female who presents with a chief complaint of Chest Pain admitted for pericarditis and r/o ACS (15 May 2018 21:50)    Patient seen and examined at bedside, No acute overnight events. Patient no longer having chest pain and notes improvement in back pain.   Patient not ambulating, eating well, voiding and last BM 2 days ago.    ROS: Denies fever, chest pain, SOB, abdominal pain, diarrhea, constipation, calf pain.    VS:   Vital Signs Last 24 Hrs  T(C): 36.9 (16 May 2018 05:13), Max: 37.1 (15 May 2018 18:20)  T(F): 98.4 (16 May 2018 05:13), Max: 98.7 (15 May 2018 18:20)  HR: 85 (16 May 2018 05:13) (80 - 88)  BP: 132/80 (16 May 2018 05:13) (125/67 - 137/84)  RR: 18 (16 May 2018 05:13) (18 - 24)  SpO2: 93% (16 May 2018 05:13) (93% - 96%)    Physical Exam:   Gen: NAD  HEENT: NCAT, EOMI, PERRLA, Pupils_  CVS: RRR, +S1/S2, no murmurs, rubs or gallops appreciated  Lungs: CTAB, no wheeze, rales, rhonchi, + R chest port  Abdomen: +BS, soft, ND, NT. no palpable flank tenderness or mass, no CVA tenderness  Ext: Left arm AV graft, no erythema around site, No cyanosis, edema or calf tenderness  Neuro: AAOx3, no focal deficits.    Labs:                        9.8    5.6   )-----------( 252      ( 15 May 2018 07:49 )             30.3   05-15    136  |  98  |  29.0<H>  ----------------------------<  96  4.0   |  25.0  |  3.13<H>    Ca    9.0      15 May 2018 07:49  Phos  2.9     05-15  Mg     1.9     05-15        05-15 @ 07:01  -  05-16 @ 07:00  --------------------------------------------------------  IN: 0 mL / OUT: 400 mL / NET: -400 mL      Radiology:  < from: US Duplex Venous Lower Ext Complete, Bilateral (05.13.18 @ 19:53) >  IMPRESSION:     No evidence of bilateral lower extremity deep venous thrombosis.    < end of copied text >    < from: Xray Chest 2 Views PA/Lat (05.13.18 @ 18:59) >  IMPRESSION:    Support Devices: Interval placement of the right dialysis catheter with   the tip in the SVC.  Heart: Cardiomegaly  Mediastinum: Normal in contour  Lungs/Airways: Moderate right and small left pleural effusion with or   without underlying airspace disease, grossly stable. No pneumothorax.  Musculoskeletal: Normal      < from: Cardiac Cath Lab - Adult (03.23.18 @ 13:53) >  VENTRICLES: There were no left ventricular global or regional wall motion  abnormalities. Global left ventricular function was normal. EF calculated  by contrast ventriculography was 55 %.  VALVES: AORTIC VALVE: No significant aortic valve gradient. MITRAL VALVE:  The mitral valve exhibited trivial regurgitation (less than 1+).  CORONARY VESSELS: The coronary circulation is right dominant.  LM:   --  LM: Normal. The vessel was normal sized, not calcified, and not  tortuous. Angiography showed no evidence of disease.  LAD:   --  LAD: Normal. The vessel was normal sized, not calcified, and not  tortuous. Angiography showed minor luminal irregularities with no flow  limiting lesions.  CX:   --  Circumflex: Normal. The vessel was normal sized, not calcified,  and excessively tortuous.Angiography showed no evidence of disease.  RCA:   --  RCA: Normal. The vessel was normal sized, not calcified, and  moderately tortuous. Angiography showed no evidence of disease.  COMPLICATIONS: There were no complications. No complications occurred  during the cath lab visit.  DIAGNOSTIC IMPRESSIONS: There is mild irregularity of the coronary anatomy.  Left ventricular function is normal (LVEF=55%).  DIAGNOSTIC RECOMMENDATIONS: The patient should continue with the present  medications. Patientmanagement should include aggressive medical therapy  and resumption of all previous activities in 2 days.      Medications:  MEDICATIONS  (STANDING):  aspirin enteric coated 81 milliGRAM(s) Oral daily  colchicine 0.6 milliGRAM(s) Oral daily  dextrose 5%. 1000 milliLiter(s) (50 mL/Hr) IV Continuous <Continuous>  dextrose 50% Injectable 12.5 Gram(s) IV Push once  dextrose 50% Injectable 25 Gram(s) IV Push once  dextrose 50% Injectable 25 Gram(s) IV Push once  enoxaparin Injectable 30 milliGRAM(s) SubCutaneous daily  ferrous    sulfate 325 milliGRAM(s) Oral daily  insulin glargine Injectable (LANTUS) 16 Unit(s) SubCutaneous at bedtime  insulin lispro (HumaLOG) corrective regimen sliding scale   SubCutaneous three times a day before meals  insulin lispro Injectable (HumaLOG) 3 Unit(s) SubCutaneous three times a day before meals  simvastatin 40 milliGRAM(s) Oral at bedtime    MEDICATIONS  (PRN):  acetaminophen   Tablet. 650 milliGRAM(s) Oral every 6 hours PRN Mild Pain (1 - 3)  cyclobenzaprine 5 milliGRAM(s) Oral three times a day PRN Muscle Spasm  dextrose 40% Gel 15 Gram(s) Oral once PRN Blood Glucose LESS THAN 70 milliGRAM(s)/deciliter  docusate sodium 100 milliGRAM(s) Oral daily PRN for constipation  glucagon  Injectable 1 milliGRAM(s) IntraMuscular once PRN Glucose LESS THAN 70 milligrams/deciliter  ibuprofen  Tablet 400 milliGRAM(s) Oral every 6 hours PRN Moderate pain  senna 2 Tablet(s) Oral at bedtime PRN Constipation

## 2018-05-16 NOTE — PROGRESS NOTE ADULT - ASSESSMENT
Ms. Elizabeth is a 59yo F with PMH HTN, HLD, DMII not on insulin at home, ESRD on HD M/W/F, hyponatremia with recent admission March 29-April 3 for vomiting and stomach pain. This admission is for chest pain.     Chest pain likely atypical in nature   - TTE revealing minimal pericarditis  - c/w colchicine and Ibuprofen PRN pain  - There could be a MSK component with pain from AVF placement  - Cardiology recommendations appreciated      Elevated Troponins  - down-trending form 0.19 to 0.16  - likely elevated in setting of ESRD as no EKG chnages and given recent cardiac cath 3/23/18 with clear coronaries and EF 60-65%.   - repeat TTE with EF 45-50%  - Aspirin, statin       ESRD on hemodialysis.   - HD not completed yesterday due to drop in BP, patient not currently in overt fluid overload   - M/W/F, HD today  - Renal Diet  - C/W Epogen.  - US showing patent AV graft in left arm  - Nephrology consuklt appreciated      Hyperglycemia due to type 2 diabetes mellitus-recently admitted and on insulin, kept on oral regimen on discharge with sliding scale only. With presenting hyponatremia which is likely worsened with recently elevated c/s   - Chronic DMII poorly controlled with insulin   - Will decrease Lantus to 16 untis given AM FS of <100 and pre-meals at 3 units  - Accuchecks before meals and at bedtime  - C/W Moderate Sliding Scale (12 units given before snack)  - A1c 8.5, recent one noted at 7.5     PAF  -  Currently in NSR   - f/u EP     Lung effusion chronic since prior admission with routine dialysis    - CXR shows improvement in L lower lobe vs March film.   -no evidence of SOB on exam      Abnormal TFTs - noted on last admission. Elevated TSH with normal T4 and Low T3. Cannot definitely say she is subclinical. Will repeat TFTs and consider adding a low dose of synthroid given patients overall status     Anemia due to chronic kidney disease.   - Improved Hb 10.4 vs 8.4 6 weeks earlier  - Renal will continue with Epogen.      Hypertension.   - C/W home meds w/parameters  - Amlodipine 5mg daily  - Clonidine 0.3mg Q12h  - Carvedilol 12.5 Q12h  - Hydralazine 50mg Q8h  - Hydrochlorothizide 25mg daily  - Losartan 100mg daily.     Need for prophylactic measure.    - Lovenox renally dosed. Ms. Elizabeth is a 59yo F with PMH HTN, HLD, DMII not on insulin at home, ESRD on HD M/W/F, hyponatremia with recent admission March 29-April 3 for vomiting and stomach pain. This admission is for chest pain.     Chest pain likely atypical in nature   - TTE revealing minimal pericarditis  - c/w colchicine and Ibuprofen PRN pain  - There could be a MSK component with pain from AVF placement  - Cardiology recommendations appreciated      Elevated Troponins  - down-trending form 0.19 to 0.16  - likely elevated in setting of ESRD as no EKG chnages and given recent cardiac cath 3/23/18 with clear coronaries and EF 60-65%.   - repeat TTE with EF 45-50%  - Aspirin, statin       ESRD on hemodialysis.   - HD not completed yesterday due to drop in BP, patient not currently in overt fluid overload   - M/W/F, HD today  - Renal Diet  - C/W Epogen.  - US showing patent AV graft in left arm  - Nephrology consuklt appreciated      Hyperglycemia due to type 2 diabetes mellitus-recently admitted and on insulin, kept on oral regimen on discharge with sliding scale only. With presenting hyponatremia which is likely worsened with recently elevated c/s   - Chronic DMII poorly controlled with insulin   - Will decrease Lantus to 16 untis given AM FS of <100 and pre-meals at 3 units  - Accuchecks before meals and at bedtime  - C/W Moderate Sliding Scale (12 units given before snack)  - A1c 8.5, recent one noted at 7.5     PAF  -  Currently in NSR   - f/u EP     Lung effusion chronic since prior admission with routine dialysis    - CXR shows improvement in L lower lobe vs March film.   -no evidence of SOB on exam      Abnormal TFTs - noted on last admission. Elevated TSH with normal T4 and Low T3. Cannot definitely say she is subclinical. Will repeat TFTs and consider adding a low dose of synthroid given patients overall status     Anemia due to chronic kidney disease.   - Improved Hb 10.4 vs 8.4 6 weeks earlier  - Renal will continue with Epogen.      Hypertension.   - Hold all BP meds, BP stable at this time  Home Meds:  - Amlodipine 5mg daily  - Clonidine 0.3mg Q12h  - Carvedilol 12.5 Q12h  - Hydralazine 50mg Q8h  - Hydrochlorothizide 25mg daily  - Losartan 100mg daily.     Need for prophylactic measure.    - Lovenox renally dosed.

## 2018-05-16 NOTE — DISCHARGE NOTE ADULT - OTHER SIGNIFICANT FINDINGS
Imaging:  < from: TTE Echo Limited or F/U (05.14.18 @ 11:09) >  Summary:   1. Left ventricular ejection fraction, by visual estimation, is 45 to   50%.   2. Mildly decreased global left ventricular systolic function.   3. Spectral Doppler shows impaired relaxation pattern of left   ventricular myocardial filling (Grade I diastolic dysfunction).   4. Moderate pleural effusion in both left and right lateral regions.   5. Trivial pericardial effusion.   6. Thickening of the anterior and posterior mitral valve leaflets.   7. Limited follow up echo for pericardial effusion. Echo done on 3/4/18   moderate pericardial effusion seen.    < end of copied text >

## 2018-05-16 NOTE — DISCHARGE NOTE ADULT - MEDICATION SUMMARY - MEDICATIONS TO STOP TAKING
I will STOP taking the medications listed below when I get home from the hospital:    simvastatin 40 mg oral tablet  -- 1 tab(s) by mouth once a day (at bedtime)    cloNIDine 0.3 mg oral tablet  -- 1 tab(s) by mouth 2 times a day    carvedilol 12.5 mg oral tablet  -- 1 tab(s) by mouth every 12 hours    HumaLOG 100 units/mL injectable solution  -- : 1 unit if glucose 151-200; 2 unit if 201-250; 3 unit if 251-300; 4 unit if 301-350; 5 unit if 351-400; 6 unit if 400+: call MD    hydrALAZINE 50 mg oral tablet  -- 1 tab(s) by mouth every 8 hours    amLODIPine 5 mg oral tablet  -- 1 tab(s) by mouth once a day    losartan-hydroCHLOROthiazide 100 mg-25 mg oral tablet  -- 1 tab(s) by mouth once a day    ferrous sulfate 159 mg oral capsule, extended release  -- 1 cap(s) by mouth 2 times a day

## 2018-05-16 NOTE — DISCHARGE NOTE ADULT - HOSPITAL COURSE
Ms. Elizabeth is a 57yo F with PMH HTN, HLD, DMII not on insulin at home, ESRD on HD M/W/F, hyponatremia, admitted for evaluation and management of chest pain. Patient found to have a pericardial effusion on TTE for which the patient was treated with colchicine and ibuprofen to decrease any inflammation, with complete resolution of symptoms. Patient further underwent an ischemic evaluation after having elevated troponin and a drop in EF to 40-45%, form 55-60% 2 months. patient underwent cardiac catheretization without intervention and found to have on-ischemic cardiomyopathy which was managed medically. Patient's troponin were close to her baseline and likely secondary to fluid overload caused by ESRD. Patient's hospital course was complicated by an episode of hypotension for which the patient did not complete dialysis that day.  Patient was on extensive list of antihypertensives prior to admission which were discontinued and patient's BP remained stable throughput the remaining hospital course, BP meds to be held on discharge. Course also included abnormal thyroid function tests which are to be repeated as an outpatient as patient is asymptomatic. Patient's insulin regimen was also adjusted for optimal blood sugar control. Patient was further evaluated by the Electrophysiologist for her paroxysmal atrial fibrillation and patient will follow-up an outpatient for consideration of possible Watchman's Procedure. Patient's statin was also held due to elevated LFTs, which are to be followed as an outpatient. Patient is now asymptomatic, with BP and blood sugars well-controlled. Patient is medically optimized for discharge at this time.     Time spent on Discharge: 45 minutes Ms. Elizabeth is a 59yo F with PMH HTN, HLD, DMII not on insulin at home, ESRD on HD M/W/F, hyponatremia, admitted for evaluation and management of chest pain. Patient found to have a pericardial effusion on TTE for which the patient was treated with colchicine and ibuprofen to decrease any inflammation, with complete resolution of symptoms. Patient further underwent an ischemic evaluation after having elevated troponin and a drop in EF to 40-45%, form 55-60% 2 months. patient underwent cardiac catheretization without intervention and found to have on-ischemic cardiomyopathy which was managed medically. Patient's troponin were close to her baseline and likely secondary to fluid overload caused by ESRD. Patient's hospital course was complicated by an episode of hypotension for which the patient did not complete dialysis that day.  Patient was on extensive list of antihypertensives prior to admission which were discontinued and patient's BP remained stable throughput the remaining hospital course, BP meds to be held on discharge. Course also included abnormal thyroid function tests which are to be repeated as an outpatient as patient is asymptomatic. Patient's insulin regimen was also adjusted for optimal blood sugar control. Patient was further evaluated by the Electrophysiologist for her paroxysmal atrial fibrillation and patient will follow-up an outpatient for consideration of possible Watchman's Procedure. Patient's statin was also held due to elevated LFTs, which are to be followed as an outpatient. Pt has H/O Paroxysmal A fib, currently in NSR. Not on anticoagulation because of recent GI bleed. Pt and family aware of risks and Benefits. Follow up with cardiology Outpatient for watchman's procedure once acute pericarditis resolves. Patient is now asymptomatic, with BP and blood sugars well-controlled. Patient is medically optimized for discharge at this time.     Disposition : Home with Home health care   Total Time spent on Discharge: 45 minutes, more than 50 % time spent on counselling and co-ordination of patient's care Ms. Elizabeth is a 59yo F with PMH HTN, HLD, DMII not on insulin at home, ESRD on HD M/W/F, hyponatremia, admitted for evaluation and management of chest pain. Patient found to have a pericardial effusion on TTE for which the patient was treated with colchicine and ibuprofen to decrease any inflammation, with complete resolution of symptoms. Patient further underwent an ischemic evaluation after having elevated troponin and a drop in EF to 40-45%, form 55-60% 2 months. patient underwent cardiac catheretization without intervention and found to have on-ischemic cardiomyopathy which was managed medically. Patient's troponin were close to her baseline and likely secondary to fluid overload caused by ESRD. Patient's hospital course was complicated by an episode of hypotension for which the patient did not complete dialysis that day.  Patient was on extensive list of antihypertensives prior to admission which were discontinued and patient's BP remained stable throughput the remaining hospital course, BP meds to be held on discharge. Course also included abnormal thyroid function tests which are to be repeated as an outpatient as patient is asymptomatic. Patient's insulin regimen was also adjusted for optimal blood sugar control. Patient was further evaluated by the Electrophysiologist for her paroxysmal atrial fibrillation and patient will follow-up an outpatient for consideration of possible Watchman's Procedure. Patient will no longer be taking coumadin or AC due to previous GI bleed. Patient's statin was also held due to elevated LFTs, which are to be followed as an outpatient. Pt has H/O Paroxysmal A fib, currently in NSR. Not on anticoagulation because of recent GI bleed. Pt and family aware of risks and Benefits. Follow up with cardiology Outpatient for watchman's procedure once acute pericarditis resolves. Patient is now asymptomatic, with BP and blood sugars well-controlled. Patient is medically optimized for discharge at this time.     Disposition : Home with Home health care   Total Time spent on Discharge: 45 minutes, more than 50 % time spent on counselling and co-ordination of patient's care

## 2018-05-16 NOTE — DISCHARGE NOTE ADULT - SECONDARY DIAGNOSIS.
End stage renal disease on dialysis Type 2 diabetes mellitus with hyperglycemia, with long-term current use of insulin Paroxysmal atrial fibrillation Anemia of chronic disease Essential hypertension Abnormal thyroid function test

## 2018-05-16 NOTE — DISCHARGE NOTE ADULT - PATIENT PORTAL LINK FT
You can access the [facility], offered by [facility], by registering with the following website: [email or web address]

## 2018-05-16 NOTE — DISCHARGE NOTE ADULT - CARE PLAN
Principal Discharge DX:	Pericardial effusion  Goal:	Monitor  Assessment and plan of treatment:	You had some chest pain likely due to the pericardial effusion which is a small collection of fluid around the heart and was found on the ultrasound of your heart (TTE). Please take the colchicine and Ibuprofen as prescribed. Please follow-up with the Cardiologist in 2 weeks.  Secondary Diagnosis:	End stage renal disease on dialysis  Goal:	Monitor  Assessment and plan of treatment:	Continue following a renal diet as advised by your Nephrologist. If you have been started on any medications for your kidney function, be sure to take them regularly. Be sure to follow up and have dialysis as scheduled, if it very important that you do not miss sessions! If something comes up and you feel that you cannot make a session, please alert your nephrologist immediately.  Secondary Diagnosis:	Type 2 diabetes mellitus with hyperglycemia, with long-term current use of insulin  Goal:	Monitor  Assessment and plan of treatment:	Follow a low carb low sugar diet. Continue to take all antidiabetic medications/insulin as prescribed. Follow up with your Primary Care Doctor regularly for blood sugar/A1c checks. Be sure to see an eye doctor and foot doctor on an annual basis.  Secondary Diagnosis:	Paroxysmal atrial fibrillation  Goal:	Monitor  Assessment and plan of treatment:	You did not have any concerning episodes of atrial fibrillation in the hospital. Please make sure to follow-up with Dr. Stephens, the Electrophysiologist as an outpatient.  Secondary Diagnosis:	Anemia of chronic disease  Goal:	Monitor  Assessment and plan of treatment:	follow up with your Primary Care Doctor to review your blood work regularly. Be sure to alert a medical professional immediately if you have symptoms of acute or worsening anemia including but not limited to the following: lightheadedness, dizziness, paleness, palpitations, shortness of breath, abnormal bleeding.  Secondary Diagnosis:	Essential hypertension  Goal:	BP<150/90  Assessment and plan of treatment:	Be sure to follow a low salt diet. If you have been prescribed antihypertensive medications to control your blood pressure, be sure to take them every day as prescribed and do not miss any doses, the medications do not work if they are not taken consistently. Follow up with your Primary Care Doctor and have your Blood Pressure checked.  Secondary Diagnosis:	Abnormal thyroid function test  Goal:	Follow-up  Assessment and plan of treatment:	You were noticed to have thyroid and liver blood work with some abnormalities. Your statin medication was held due to elevated liver enzymes. At this time, you are safe to leave the hospital, we do not suspect that anything immediately will come of these findings, but it is something that should be followed to see if it is getting better, staying the same, or getting worse. Be sure to discuss this at your follow up visit with your Primary Care Doctor to have these tests repeated and to see what work up, if any, is necessary to continue managing it. Principal Discharge DX:	Pericardial effusion  Goal:	Monitor  Assessment and plan of treatment:	You had some chest pain likely due to the pericardial effusion which is a small collection of fluid around the heart and was found on the ultrasound of your heart (TTE). Please take the colchicine and Ibuprofen as prescribed. Please follow-up with the Cardiologist in 2 weeks.  Secondary Diagnosis:	End stage renal disease on dialysis  Goal:	Monitor  Assessment and plan of treatment:	Continue following a renal diet as advised by your Nephrologist. If you have been started on any medications for your kidney function, be sure to take them regularly. Be sure to follow up and have dialysis as scheduled, if it very important that you do not miss sessions! If something comes up and you feel that you cannot make a session, please alert your nephrologist immediately.  Secondary Diagnosis:	Type 2 diabetes mellitus with hyperglycemia, with long-term current use of insulin  Goal:	Monitor  Assessment and plan of treatment:	Follow a low carb low sugar diet. Continue to take all antidiabetic medications/insulin as prescribed. Follow up with your Primary Care Doctor regularly for blood sugar/A1c checks. Be sure to see an eye doctor and foot doctor on an annual basis.  Secondary Diagnosis:	Paroxysmal atrial fibrillation  Goal:	Monitor  Assessment and plan of treatment:	You did not have any concerning episodes of atrial fibrillation in the hospital. Please make sure to follow-up with Dr. Stephens, the Electrophysiologist as an outpatient. Please do not take coumadin or Anticoagulation due to previous GI bleed.  Secondary Diagnosis:	Anemia of chronic disease  Goal:	Monitor  Assessment and plan of treatment:	follow up with your Primary Care Doctor to review your blood work regularly. Be sure to alert a medical professional immediately if you have symptoms of acute or worsening anemia including but not limited to the following: lightheadedness, dizziness, paleness, palpitations, shortness of breath, abnormal bleeding.  Secondary Diagnosis:	Essential hypertension  Goal:	BP<150/90  Assessment and plan of treatment:	Be sure to follow a low salt diet. If you have been prescribed antihypertensive medications to control your blood pressure, be sure to take them every day as prescribed and do not miss any doses, the medications do not work if they are not taken consistently. Follow up with your Primary Care Doctor and have your Blood Pressure checked.  Secondary Diagnosis:	Abnormal thyroid function test  Goal:	Follow-up  Assessment and plan of treatment:	You were noticed to have thyroid and liver blood work with some abnormalities. Your statin medication was held due to elevated liver enzymes. At this time, you are safe to leave the hospital, we do not suspect that anything immediately will come of these findings, but it is something that should be followed to see if it is getting better, staying the same, or getting worse. Be sure to discuss this at your follow up visit with your Primary Care Doctor to have these tests repeated and to see what work up, if any, is necessary to continue managing it.

## 2018-05-16 NOTE — DISCHARGE NOTE ADULT - ADDITIONAL INSTRUCTIONS
Please follow-up with your Nephrologist remember to attend all dialysis sessions as scheduled  Please follow-up with your PMD in 3-5 days to review all medications  Please follow-up with your Cardiologist in 2 weeks.  Please follow-up with the Electrophysiologist to discuss your atrial fibrillation.

## 2018-05-16 NOTE — DISCHARGE NOTE ADULT - CARE PROVIDERS DIRECT ADDRESSES
,DirectAddress_Unknown,DirectAddress_Unknown,[email or web address]

## 2018-05-16 NOTE — DISCHARGE NOTE ADULT - PLAN OF CARE
Monitor You had some chest pain likely due to the pericardial effusion which is a small collection of fluid around the heart and was found on the ultrasound of your heart (TTE). Please take the colchicine and Ibuprofen as prescribed. Please follow-up with the Cardiologist in 2 weeks. Continue following a renal diet as advised by your Nephrologist. If you have been started on any medications for your kidney function, be sure to take them regularly. Be sure to follow up and have dialysis as scheduled, if it very important that you do not miss sessions! If something comes up and you feel that you cannot make a session, please alert your nephrologist immediately. Follow a low carb low sugar diet. Continue to take all antidiabetic medications/insulin as prescribed. Follow up with your Primary Care Doctor regularly for blood sugar/A1c checks. Be sure to see an eye doctor and foot doctor on an annual basis. You did not have any concerning episodes of atrial fibrillation in the hospital. Please make sure to follow-up with Dr. Stephens, the Electrophysiologist as an outpatient. follow up with your Primary Care Doctor to review your blood work regularly. Be sure to alert a medical professional immediately if you have symptoms of acute or worsening anemia including but not limited to the following: lightheadedness, dizziness, paleness, palpitations, shortness of breath, abnormal bleeding. BP<150/90 Be sure to follow a low salt diet. If you have been prescribed antihypertensive medications to control your blood pressure, be sure to take them every day as prescribed and do not miss any doses, the medications do not work if they are not taken consistently. Follow up with your Primary Care Doctor and have your Blood Pressure checked. Follow-up You were noticed to have thyroid and liver blood work with some abnormalities. Your statin medication was held due to elevated liver enzymes. At this time, you are safe to leave the hospital, we do not suspect that anything immediately will come of these findings, but it is something that should be followed to see if it is getting better, staying the same, or getting worse. Be sure to discuss this at your follow up visit with your Primary Care Doctor to have these tests repeated and to see what work up, if any, is necessary to continue managing it. You did not have any concerning episodes of atrial fibrillation in the hospital. Please make sure to follow-up with Dr. Stephens, the Electrophysiologist as an outpatient. Please do not take coumadin or Anticoagulation due to previous GI bleed.

## 2018-05-16 NOTE — PROGRESS NOTE ADULT - SUBJECTIVE AND OBJECTIVE BOX
Patient comfortable on HD today.    Vital Signs Last 24 Hrs    T(C): 37.1 (16 May 2018 13:23), Max: 37.1 (15 May 2018 18:20)  T(F): 98.7 (16 May 2018 13:23), Max: 98.7 (15 May 2018 18:20)  HR: 85 (16 May 2018 13:23) (80 - 91)  BP: 124/78 (16 May 2018 13:23) (115/71 - 137/84)  BP(mean): --  RR: 16 (16 May 2018 13:23) (16 - 24)  SpO2: 97% (16 May 2018 13:23) (93% - 97%)  T(C): 36.8 (15 May 2018 11:20), Max: 37 (14 May 2018 23:17)  T(F): 98.3 (15 May 2018 11:20), Max: 98.6 (14 May 2018 23:17)  HR: 81 (15 May 2018 11:20) (73 - 81)  BP: 125/67 (15 May 2018 11:20) (119/65 - 131/75)  BP(mean): --  RR: 23 (15 May 2018 11:20) (17 - 23)  SpO2: 95% (15 May 2018 11:20) (95% - 98%)    PHYSICAL EXAM    GENERAL: NAD,   EYES:  conjunctiva and sclera clear  NECK: Supple, No JVD/Bruit  NERVOUS SYSTEM:  alert, verbal  CHEST:  No rales, No rhonchi  HEART: no rub  ABDOMEN: Soft, not tender, BS+  EXTREMITIES:  No Edema; left upper arm with bruit  SKIN: No rashes      Lab.    05-16    131<L>  |  94<L>  |  41.0<H>  ----------------------------<  216<H>  4.0   |  20.0<L>  |  4.18<H>    Ca    8.8      16 May 2018 14:07  Phos  2.9     05-15  Mg     1.9     05-15    TPro  6.2<L>  /  Alb  2.6<L>  /  TBili  0.2<L>  /  DBili  x   /  AST  130<H>  /  ALT  37<H>  /  AlkPhos  93  05-16    15 May 2018 07:49    136    |  98     |  29.0   ----------------------------<  96     4.0     |  25.0   |  3.13     Ca    9.0        15 May 2018 07:49  Phos  2.9       15 May 2018 07:49  Mg     1.9       15 May 2018 07:49    TPro  6.7    /  Alb  3.0    /  TBili  0.3    /  DBili  x      /  AST  52     /  ALT  18     /  AlkPhos  88     13 May 2018 18:51                          9.8    5.6   )-----------( 252      ( 15 May 2018 07:49 )             30.3       ECHO shows trace pericardial eff cf 3/18 - showed moderate  now appears to have Atypical Cp  being treated as Pericarditis; EP noted; ? eventual Watchman procedure  HD am  Continue present treatment

## 2018-05-17 ENCOUNTER — INPATIENT (INPATIENT)
Facility: HOSPITAL | Age: 58
LOS: 0 days | Discharge: NURSING, REHAB OR LONG-TERM CARE | DRG: 535 | End: 2018-05-18
Attending: INTERNAL MEDICINE | Admitting: INTERNAL MEDICINE
Payer: COMMERCIAL

## 2018-05-17 VITALS
RESPIRATION RATE: 16 BRPM | WEIGHT: 139.99 LBS | SYSTOLIC BLOOD PRESSURE: 134 MMHG | HEIGHT: 60 IN | OXYGEN SATURATION: 96 % | TEMPERATURE: 98 F | HEART RATE: 80 BPM | DIASTOLIC BLOOD PRESSURE: 83 MMHG

## 2018-05-17 DIAGNOSIS — Z49.01 ENCOUNTER FOR FITTING AND ADJUSTMENT OF EXTRACORPOREAL DIALYSIS CATHETER: Chronic | ICD-10-CM

## 2018-05-17 DIAGNOSIS — R53.1 WEAKNESS: ICD-10-CM

## 2018-05-17 DIAGNOSIS — I77.0 ARTERIOVENOUS FISTULA, ACQUIRED: Chronic | ICD-10-CM

## 2018-05-17 LAB
ALBUMIN SERPL ELPH-MCNC: 2.9 G/DL — LOW (ref 3.3–5.2)
ALP SERPL-CCNC: 114 U/L — SIGNIFICANT CHANGE UP (ref 40–120)
ALT FLD-CCNC: 66 U/L — HIGH
ANION GAP SERPL CALC-SCNC: 14 MMOL/L — SIGNIFICANT CHANGE UP (ref 5–17)
APTT BLD: 30.6 SEC — SIGNIFICANT CHANGE UP (ref 27.5–37.4)
AST SERPL-CCNC: 220 U/L — HIGH
BASOPHILS # BLD AUTO: 0 K/UL — SIGNIFICANT CHANGE UP (ref 0–0.2)
BASOPHILS NFR BLD AUTO: 0.3 % — SIGNIFICANT CHANGE UP (ref 0–2)
BILIRUB SERPL-MCNC: 0.4 MG/DL — SIGNIFICANT CHANGE UP (ref 0.4–2)
BLD GP AB SCN SERPL QL: SIGNIFICANT CHANGE UP
BUN SERPL-MCNC: 27 MG/DL — HIGH (ref 8–20)
CALCIUM SERPL-MCNC: 9.3 MG/DL — SIGNIFICANT CHANGE UP (ref 8.6–10.2)
CHLORIDE SERPL-SCNC: 91 MMOL/L — LOW (ref 98–107)
CK MB CFR SERPL CALC: 57.5 NG/ML — HIGH (ref 0–6.7)
CK SERPL-CCNC: 6169 U/L — HIGH (ref 25–170)
CO2 SERPL-SCNC: 27 MMOL/L — SIGNIFICANT CHANGE UP (ref 22–29)
CREAT SERPL-MCNC: 3.3 MG/DL — HIGH (ref 0.5–1.3)
EOSINOPHIL # BLD AUTO: 0 K/UL — SIGNIFICANT CHANGE UP (ref 0–0.5)
EOSINOPHIL NFR BLD AUTO: 0.4 % — SIGNIFICANT CHANGE UP (ref 0–6)
GLUCOSE BLDC GLUCOMTR-MCNC: 175 MG/DL — HIGH (ref 70–99)
GLUCOSE BLDC GLUCOMTR-MCNC: 292 MG/DL — HIGH (ref 70–99)
GLUCOSE SERPL-MCNC: 220 MG/DL — HIGH (ref 70–115)
HCT VFR BLD CALC: 36 % — LOW (ref 37–47)
HGB BLD-MCNC: 11.9 G/DL — LOW (ref 12–16)
INR BLD: 1 RATIO — SIGNIFICANT CHANGE UP (ref 0.88–1.16)
LYMPHOCYTES # BLD AUTO: 1 K/UL — SIGNIFICANT CHANGE UP (ref 1–4.8)
LYMPHOCYTES # BLD AUTO: 14.3 % — LOW (ref 20–55)
MAGNESIUM SERPL-MCNC: 2.1 MG/DL — SIGNIFICANT CHANGE UP (ref 1.8–2.6)
MCHC RBC-ENTMCNC: 28.9 PG — SIGNIFICANT CHANGE UP (ref 27–31)
MCHC RBC-ENTMCNC: 33.1 G/DL — SIGNIFICANT CHANGE UP (ref 32–36)
MCV RBC AUTO: 87.4 FL — SIGNIFICANT CHANGE UP (ref 81–99)
MONOCYTES # BLD AUTO: 0.5 K/UL — SIGNIFICANT CHANGE UP (ref 0–0.8)
MONOCYTES NFR BLD AUTO: 7.5 % — SIGNIFICANT CHANGE UP (ref 3–10)
NEUTROPHILS # BLD AUTO: 5.6 K/UL — SIGNIFICANT CHANGE UP (ref 1.8–8)
NEUTROPHILS NFR BLD AUTO: 77.4 % — HIGH (ref 37–73)
PHOSPHATE SERPL-MCNC: 3.7 MG/DL — SIGNIFICANT CHANGE UP (ref 2.4–4.7)
PLATELET # BLD AUTO: 297 K/UL — SIGNIFICANT CHANGE UP (ref 150–400)
POTASSIUM SERPL-MCNC: 4.6 MMOL/L — SIGNIFICANT CHANGE UP (ref 3.5–5.3)
POTASSIUM SERPL-SCNC: 4.6 MMOL/L — SIGNIFICANT CHANGE UP (ref 3.5–5.3)
PROT SERPL-MCNC: 7.3 G/DL — SIGNIFICANT CHANGE UP (ref 6.6–8.7)
PROTHROM AB SERPL-ACNC: 11 SEC — SIGNIFICANT CHANGE UP (ref 9.8–12.7)
RBC # BLD: 4.12 M/UL — LOW (ref 4.4–5.2)
RBC # FLD: 15 % — SIGNIFICANT CHANGE UP (ref 11–15.6)
SODIUM SERPL-SCNC: 132 MMOL/L — LOW (ref 135–145)
TYPE + AB SCN PNL BLD: SIGNIFICANT CHANGE UP
WBC # BLD: 7.2 K/UL — SIGNIFICANT CHANGE UP (ref 4.8–10.8)
WBC # FLD AUTO: 7.2 K/UL — SIGNIFICANT CHANGE UP (ref 4.8–10.8)

## 2018-05-17 PROCEDURE — 72170 X-RAY EXAM OF PELVIS: CPT | Mod: 26

## 2018-05-17 PROCEDURE — 72125 CT NECK SPINE W/O DYE: CPT | Mod: 26

## 2018-05-17 PROCEDURE — 72131 CT LUMBAR SPINE W/O DYE: CPT | Mod: 26

## 2018-05-17 PROCEDURE — 99285 EMERGENCY DEPT VISIT HI MDM: CPT

## 2018-05-17 PROCEDURE — 71046 X-RAY EXAM CHEST 2 VIEWS: CPT | Mod: 26

## 2018-05-17 PROCEDURE — 70450 CT HEAD/BRAIN W/O DYE: CPT | Mod: 26

## 2018-05-17 PROCEDURE — 93010 ELECTROCARDIOGRAM REPORT: CPT

## 2018-05-17 RX ORDER — SODIUM CHLORIDE 9 MG/ML
1000 INJECTION, SOLUTION INTRAVENOUS
Qty: 0 | Refills: 0 | Status: DISCONTINUED | OUTPATIENT
Start: 2018-05-17 | End: 2018-05-18

## 2018-05-17 RX ORDER — INSULIN LISPRO 100/ML
VIAL (ML) SUBCUTANEOUS
Qty: 0 | Refills: 0 | Status: DISCONTINUED | OUTPATIENT
Start: 2018-05-17 | End: 2018-05-18

## 2018-05-17 RX ORDER — FERROUS SULFATE 325(65) MG
325 TABLET ORAL DAILY
Qty: 0 | Refills: 0 | Status: DISCONTINUED | OUTPATIENT
Start: 2018-05-17 | End: 2018-05-18

## 2018-05-17 RX ORDER — DEXTROSE 50 % IN WATER 50 %
25 SYRINGE (ML) INTRAVENOUS ONCE
Qty: 0 | Refills: 0 | Status: DISCONTINUED | OUTPATIENT
Start: 2018-05-17 | End: 2018-05-18

## 2018-05-17 RX ORDER — DEXTROSE 50 % IN WATER 50 %
15 SYRINGE (ML) INTRAVENOUS ONCE
Qty: 0 | Refills: 0 | Status: DISCONTINUED | OUTPATIENT
Start: 2018-05-17 | End: 2018-05-18

## 2018-05-17 RX ORDER — ACETAMINOPHEN 500 MG
650 TABLET ORAL ONCE
Qty: 0 | Refills: 0 | Status: COMPLETED | OUTPATIENT
Start: 2018-05-17 | End: 2018-05-17

## 2018-05-17 RX ORDER — SENNA PLUS 8.6 MG/1
2 TABLET ORAL AT BEDTIME
Qty: 0 | Refills: 0 | Status: DISCONTINUED | OUTPATIENT
Start: 2018-05-17 | End: 2018-05-18

## 2018-05-17 RX ORDER — DEXTROSE 50 % IN WATER 50 %
12.5 SYRINGE (ML) INTRAVENOUS ONCE
Qty: 0 | Refills: 0 | Status: DISCONTINUED | OUTPATIENT
Start: 2018-05-17 | End: 2018-05-18

## 2018-05-17 RX ORDER — DOCUSATE SODIUM 100 MG
100 CAPSULE ORAL
Qty: 0 | Refills: 0 | Status: DISCONTINUED | OUTPATIENT
Start: 2018-05-17 | End: 2018-05-18

## 2018-05-17 RX ORDER — GLUCAGON INJECTION, SOLUTION 0.5 MG/.1ML
1 INJECTION, SOLUTION SUBCUTANEOUS ONCE
Qty: 0 | Refills: 0 | Status: DISCONTINUED | OUTPATIENT
Start: 2018-05-17 | End: 2018-05-18

## 2018-05-17 RX ORDER — ASPIRIN/CALCIUM CARB/MAGNESIUM 324 MG
81 TABLET ORAL DAILY
Qty: 0 | Refills: 0 | Status: DISCONTINUED | OUTPATIENT
Start: 2018-05-17 | End: 2018-05-18

## 2018-05-17 RX ORDER — COLCHICINE 0.6 MG
0.6 TABLET ORAL DAILY
Qty: 0 | Refills: 0 | Status: DISCONTINUED | OUTPATIENT
Start: 2018-05-17 | End: 2018-05-18

## 2018-05-17 RX ORDER — HEPARIN SODIUM 5000 [USP'U]/ML
5000 INJECTION INTRAVENOUS; SUBCUTANEOUS EVERY 12 HOURS
Qty: 0 | Refills: 0 | Status: DISCONTINUED | OUTPATIENT
Start: 2018-05-17 | End: 2018-05-18

## 2018-05-17 RX ORDER — INSULIN GLARGINE 100 [IU]/ML
16 INJECTION, SOLUTION SUBCUTANEOUS AT BEDTIME
Qty: 0 | Refills: 0 | Status: DISCONTINUED | OUTPATIENT
Start: 2018-05-17 | End: 2018-05-18

## 2018-05-17 RX ADMIN — HEPARIN SODIUM 5000 UNIT(S): 5000 INJECTION INTRAVENOUS; SUBCUTANEOUS at 18:45

## 2018-05-17 RX ADMIN — Medication 650 MILLIGRAM(S): at 14:15

## 2018-05-17 RX ADMIN — Medication 1: at 18:47

## 2018-05-17 RX ADMIN — Medication 650 MILLIGRAM(S): at 14:50

## 2018-05-17 RX ADMIN — Medication 81 MILLIGRAM(S): at 18:44

## 2018-05-17 RX ADMIN — INSULIN GLARGINE 16 UNIT(S): 100 INJECTION, SOLUTION SUBCUTANEOUS at 22:12

## 2018-05-17 NOTE — CONSULT NOTE ADULT - ASSESSMENT
c/o weakness both LE- cause unclear ; has some LBP ; w/u UW; CT C and L/S spine done  Will need neuro eval  HD am , consent obtd and arranged

## 2018-05-17 NOTE — CONSULT NOTE ADULT - SUBJECTIVE AND OBJECTIVE BOX
Patient is a 58y old  Female who presents with a chief complaint of Weakness (17 May 2018 15:52)  has ESRD - on HD MWF for which called    HPI:  The patient is a 58 year old female with a history of ESRD on hemodialysis Monday, Wed,Fri, discharged from the hospital yesterday who presented to the ER for bilateral leg weakness and a fall. THe patient was admitted initially for chest pain found to have a pericardial effusion which was drained. IN the interim she had a cardiac cath with non obstructive disease, diagnosed with non ischemic cardiomyopathy. All blood pressure medications were discontinued for hypotension and insulin adjusted for hyperglycemia. She was seen by physical therapy on 5/15/18 and was discharged home with home care. THis morning when the home care nurse went to visit she noted she was unable to get out of bed or stand on her own due to leg weakness and fell back. According to the patient her legs have felt weak for a few days and now she is unable to stand at all without support. CT of the lumbar and thoracic spine were negative in the ER, seen by PT and recommend DAPHNE> (17 May 2018 15:52)      PAST MEDICAL & SURGICAL HISTORY:  Pleural effusion  Pericardial effusion  End stage renal disease on dialysis  HLD (hyperlipidemia)  HTN (hypertension)  DM (diabetes mellitus)  A-V fistula  Encounter for dialysis catheter care    FAMILY HISTORY:  No pertinent family history in first degree relatives    Social History:   -smoke   - Alcohol   -    Drugs        REVIEW OF SYSTEMS:  CONSTITUTIONAL: No fever, weight loss, or fatigue  EYES: No eye pain, No visual disturbances,   RESPIRATORY: No cough, hemoptysis; - SOB  CARDIOVASCULAR: No chest pain, No palpitations,   -leg swelling  GASTROINTESTINAL: No abdominal , NVD or GIB  GENITOURINARY: No UTI sx/hematuria  NEUROLOGICAL: No HA, + weakness both LEs, couldn't get oob  MUSCULOSKELETAL: No joint pain, has some LBP, No swelling feet      Vital Signs Last 24 Hrs  T(C): 36.8 (17 May 2018 15:40), Max: 36.9 (17 May 2018 12:56)  T(F): 98.2 (17 May 2018 15:40), Max: 98.5 (17 May 2018 12:56)  HR: 87 (17 May 2018 15:40) (80 - 108)  BP: 141/91 (17 May 2018 15:40) (134/68 - 152/86)  BP(mean): --  RR: 18 (17 May 2018 15:40) (16 - 24)  SpO2: 95% (17 May 2018 15:40) (93% - 96%)    PHYSICAL EXAM:    GENERAL: NAD,   EYES:  conjunctiva and sclera clear  NECK: Supple, No JVD/Carotid Bruit -ve   NERVOUS SYSTEM:  A/O x3, LE power 3/5; UE 4/5; Plantars downgoing  Lungs : No rales, No rhonchi,   HEART:  No murmur,  No rub, No gallops  ABDOMEN: Soft, NT/ND BS+  EXTREMITIES:  + Peripheral Pulses, - edema  SKIN: No rashes or lesions      LABS:                        11.9   7.2   )-----------( 297      ( 17 May 2018 14:49 )             36.0     05-17    132<L>  |  91<L>  |  27.0<H>  ----------------------------<  220<H>  4.6   |  27.0  |  3.30<H>    Ca    9.3      17 May 2018 14:49  Phos  3.7     05-17  Mg     2.1     05-17    TPro  7.3  /  Alb  2.9<L>  /  TBili  0.4  /  DBili  x   /  AST  220<H>  /  ALT  66<H>  /  AlkPhos  114  05-17    PT/INR - ( 17 May 2018 14:49 )   PT: 11.0 sec;   INR: 1.00 ratio         PTT - ( 17 May 2018 14:49 )  PTT:30.6 sec    Magnesium, Serum: 2.1 mg/dL (05-17 @ 14:49)  Phosphorus Level, Serum: 3.7 mg/dL (05-17 @ 14:49)      RADIOLOGY & ADDITIONAL TESTS:    MEDICATIONS  (STANDING):  aspirin enteric coated  colchicine  dextrose 40% Gel PRN  dextrose 5%.  dextrose 50% Injectable  dextrose 50% Injectable  dextrose 50% Injectable  docusate sodium PRN  ferrous    sulfate  glucagon  Injectable PRN  heparin  Injectable  insulin glargine Injectable (LANTUS)  insulin lispro (HumaLOG) corrective regimen sliding scale  senna PRN

## 2018-05-17 NOTE — ED ADULT NURSE NOTE - OBJECTIVE STATEMENT
Assumed pt care at 1320.  Pt a&ox3 c/o generalized weakness and back/ b/L knee pain.  Pts daughter in law at bedside states that she was trying to get up today from bed with home health nurse and patient fell back onto bed, denies any LOC.  Pt unable to ambulate, c/o 7/10 generalized pain, no acute s/s of respiratory distress noted or reported, will continue to monitor.

## 2018-05-17 NOTE — PHYSICAL THERAPY INITIAL EVALUATION ADULT - ADDITIONAL COMMENTS
Daughter-in-law says pt lives with her  and son in a house on the 2nd floor with 4 steps to enter (+right rail). she states her  is not working now because he is caring for her.  she says he assists her to stand because of arm weakness, then she amb w/RW by herself. he also assists her on the stairs and that she is never alone.

## 2018-05-17 NOTE — ED ADULT TRIAGE NOTE - CHIEF COMPLAINT QUOTE
Patient comes to ED from home for bilateral leg weakness which is unchanged since her previous admission 5 days ago.

## 2018-05-17 NOTE — ED PROVIDER NOTE - PROGRESS NOTE DETAILS
PT evaluated patient unable to walk in ED, meets criteria for inpatient re-hab, SW at bedside, admit to OBS for placement ATTENDING: Patient does not meet criteria for limited observation spots and therefore requires full hospital admission.

## 2018-05-17 NOTE — H&P ADULT - ASSESSMENT
The patient is a 58 year old female with a history of ESRD on hemodialysis Monday, Wed,Fri, discharged from the hospital yesterday who presented to the ER for bilateral leg weakness and a fall.     Assessment/Plan:    1. Bilateral leg weakness: debility? CT head ordered, fall precautions, seen by PT, recommend DAPHNE    2. ESRD On HD MWF: Nephrology called for HD in AM    3. REcent pericardial effusion: On colcichine    4. DM type 2: HUmalog sliding scale. COntinue lantus Monitor bsl    5. Hypertension: BP meds recently discontinued for hypotension. Monitor closely    6. HLD: Statin was disconitnued for elevated ALT, AST,    VTE_ heparin subcut The patient is a 58 year old female with a history of ESRD on hemodialysis Monday, Wed,Fri, discharged from the hospital yesterday who presented to the ER for bilateral leg weakness and a fall.     Assessment/Plan:    1. Bilateral leg weakness possibly secondary to rhabdomyolysis with elevated CK of 6000, higher than 2 days ago  debility?   CT head ordered   fall precautions  Seen by PT, recommend DAPHNE    2. ESRD On HD MWF: Nephrology called for HD in AM    3. REcent pericardial effusion: On colcichine    4. DM type 2: HUmalog sliding scale. COntinue lantus Monitor bsl    5. Hypertension: BP meds recently discontinued for hypotension. Monitor closely    6. HLD: Statin was disconitnued for elevated ALT, AST,    VTE_ heparin subcut

## 2018-05-17 NOTE — H&P ADULT - HISTORY OF PRESENT ILLNESS
The patient is a 58 year old female with a history of ESRD on hemodialysis Monday, Wed,Fri, discharged from the hospital yesterday who presented to the ER for bilateral leg weakness and a fall. THe patient was admitted initially for chest pain found to have a pericardial effusion which was drained. IN the interim she had a cardiac cath with non obstructive disease, diagnosed with non ischemic cardiomyopathy. All blood pressure medications were discontinued for hypotension and insulin adjusted for hyperglycemia. She was seen by physical therapy on 5/15/18 and was discharged home with home care. THis morning when the home care nurse went to visit she noted she was unable to get out of bed or stand on her own due to leg weakness and fell back. According to the patient her legs have felt weak for a few days and now she is unable to stand at all without support. CT of the lumbar and thoracic spine were negative in the ER, seen by PT and recommend DAPHNE>

## 2018-05-17 NOTE — ED PROVIDER NOTE - ATTENDING CONTRIBUTION TO CARE
I, Elda Burrows, independently evaluated the patient. The PA made the initial evaluation and discussed history, physical and plan with me and I agree. I examined the patient noting 5/5 upper extremity weakness with 2/5 lower extremity weakness, however the patient is not participating in the exam. No slurred speech is appreciated and reflexes and sensation are intact bilateral upper and lower extremity, and discussed need for CT head as well as full work up to rule out infectious cause of weakness, PT evaluation and possible need for admission.

## 2018-05-17 NOTE — ED ADULT NURSE NOTE - PMH
DM (diabetes mellitus)    End stage renal disease on dialysis    HLD (hyperlipidemia)    HTN (hypertension)    Pericardial effusion    Pleural effusion

## 2018-05-17 NOTE — CONSULT NOTE ADULT - SUBJECTIVE AND OBJECTIVE BOX
Asked by the Medical Team to Evaluate a 58 year old female who has a complaint of Left Hip Pain.  From obtaining history patient has been having weakness in her lower extremity and suffered a fall.  After the fall she tried to bear weight but was still unable to ambulate.  She was brought to the ED to be evaluated.  CT scans of her spine were negative for fracture, however a pelvis X-rays showed patient to have a Left Superior Rami Fracture.      PMHx:  DM (diabetes mellitus)    End stage renal disease on dialysis    HLD (hyperlipidemia)    HTN (hypertension)    Pericardial effusion    Pleural effusion    PSHx:  A-V fistula    Encounter for dialysis catheter care    Allergies:  NKDA    Review of Systems:  Muscular Skeletal: Left Hip Pain  Remaining systems were reviewed and found to be negative from the history obtained    Physical;  Vital Signs Last 24 Hrs  T(C): 36.8 (17 May 2018 19:15), Max: 36.9 (17 May 2018 12:56)  T(F): 98.2 (17 May 2018 19:15), Max: 98.5 (17 May 2018 12:56)  HR: 98 (17 May 2018 19:15) (80 - 99)  BP: 148/89 (17 May 2018 19:15) (134/83 - 148/89)  BP(mean): --  RR: 18 (17 May 2018 19:15) (16 - 20)  SpO2: 97% (17 May 2018 19:15) (94% - 97%)    Left lower Extremity:  + ROM of toes, + Dorsal/Plantar Flexion of foot  + Sensation  Calf soft, non-tender  2+ Pedal pulse  PROM of hip elicits slight pain    X-Ray Pelvis:   EXAM:  PELVIS                          PROCEDURE DATE:  05/17/2018          INTERPRETATION:  AP pelvis    History: Trauma    Fracture left superior pubic ramus. Mild distraction at the fracture   site..  Hip articulations appear to be intact.. Soft tissues are normal.    Impression:  Fracture superior pubic ramus                NELIA COLLINS M.D., ATTENDING RADIOLOGIST  This document has been electronically signed. May 17 2018  3:41PM      A/p: Left Superior Rami Fracture  - OOB, PT, WBAT with Walker  - Pain medication as needed for comfort  - Continue care per medical team  - no orthopedic interventions required at this time

## 2018-05-18 ENCOUNTER — TRANSCRIPTION ENCOUNTER (OUTPATIENT)
Age: 58
End: 2018-05-18

## 2018-05-18 VITALS
TEMPERATURE: 99 F | SYSTOLIC BLOOD PRESSURE: 145 MMHG | OXYGEN SATURATION: 96 % | HEART RATE: 88 BPM | DIASTOLIC BLOOD PRESSURE: 89 MMHG | RESPIRATION RATE: 18 BRPM

## 2018-05-18 LAB
ANION GAP SERPL CALC-SCNC: 14 MMOL/L — SIGNIFICANT CHANGE UP (ref 5–17)
BUN SERPL-MCNC: 32 MG/DL — HIGH (ref 8–20)
CALCIUM SERPL-MCNC: 8.8 MG/DL — SIGNIFICANT CHANGE UP (ref 8.6–10.2)
CHLORIDE SERPL-SCNC: 92 MMOL/L — LOW (ref 98–107)
CK SERPL-CCNC: 4123 U/L — HIGH (ref 25–170)
CO2 SERPL-SCNC: 26 MMOL/L — SIGNIFICANT CHANGE UP (ref 22–29)
CREAT SERPL-MCNC: 3.45 MG/DL — HIGH (ref 0.5–1.3)
GLUCOSE BLDC GLUCOMTR-MCNC: 122 MG/DL — HIGH (ref 70–99)
GLUCOSE BLDC GLUCOMTR-MCNC: 137 MG/DL — HIGH (ref 70–99)
GLUCOSE BLDC GLUCOMTR-MCNC: 199 MG/DL — HIGH (ref 70–99)
GLUCOSE BLDC GLUCOMTR-MCNC: 243 MG/DL — HIGH (ref 70–99)
GLUCOSE SERPL-MCNC: 196 MG/DL — HIGH (ref 70–115)
HCT VFR BLD CALC: 34.3 % — LOW (ref 37–47)
HGB BLD-MCNC: 11 G/DL — LOW (ref 12–16)
MCHC RBC-ENTMCNC: 28.1 PG — SIGNIFICANT CHANGE UP (ref 27–31)
MCHC RBC-ENTMCNC: 32.1 G/DL — SIGNIFICANT CHANGE UP (ref 32–36)
MCV RBC AUTO: 87.7 FL — SIGNIFICANT CHANGE UP (ref 81–99)
PLATELET # BLD AUTO: 327 K/UL — SIGNIFICANT CHANGE UP (ref 150–400)
POTASSIUM SERPL-MCNC: 4 MMOL/L — SIGNIFICANT CHANGE UP (ref 3.5–5.3)
POTASSIUM SERPL-SCNC: 4 MMOL/L — SIGNIFICANT CHANGE UP (ref 3.5–5.3)
RBC # BLD: 3.91 M/UL — LOW (ref 4.4–5.2)
RBC # FLD: 14.8 % — SIGNIFICANT CHANGE UP (ref 11–15.6)
SODIUM SERPL-SCNC: 132 MMOL/L — LOW (ref 135–145)
WBC # BLD: 7.2 K/UL — SIGNIFICANT CHANGE UP (ref 4.8–10.8)
WBC # FLD AUTO: 7.2 K/UL — SIGNIFICANT CHANGE UP (ref 4.8–10.8)

## 2018-05-18 PROCEDURE — 99239 HOSP IP/OBS DSCHRG MGMT >30: CPT

## 2018-05-18 RX ORDER — CARVEDILOL PHOSPHATE 80 MG/1
1 CAPSULE, EXTENDED RELEASE ORAL
Qty: 60 | Refills: 0
Start: 2018-05-18 | End: 2018-06-16

## 2018-05-18 RX ORDER — OXYCODONE AND ACETAMINOPHEN 5; 325 MG/1; MG/1
1 TABLET ORAL ONCE
Qty: 0 | Refills: 0 | Status: DISCONTINUED | OUTPATIENT
Start: 2018-05-18 | End: 2018-05-18

## 2018-05-18 RX ORDER — METOPROLOL TARTRATE 50 MG
25 TABLET ORAL DAILY
Qty: 0 | Refills: 0 | Status: DISCONTINUED | OUTPATIENT
Start: 2018-05-18 | End: 2018-05-18

## 2018-05-18 RX ORDER — OXYCODONE AND ACETAMINOPHEN 5; 325 MG/1; MG/1
1 TABLET ORAL EVERY 8 HOURS
Qty: 0 | Refills: 0 | Status: DISCONTINUED | OUTPATIENT
Start: 2018-05-18 | End: 2018-05-18

## 2018-05-18 RX ADMIN — Medication 81 MILLIGRAM(S): at 13:28

## 2018-05-18 RX ADMIN — Medication 325 MILLIGRAM(S): at 13:28

## 2018-05-18 RX ADMIN — Medication 0.6 MILLIGRAM(S): at 18:08

## 2018-05-18 RX ADMIN — OXYCODONE AND ACETAMINOPHEN 1 TABLET(S): 5; 325 TABLET ORAL at 11:46

## 2018-05-18 RX ADMIN — Medication 25 MILLIGRAM(S): at 18:25

## 2018-05-18 RX ADMIN — HEPARIN SODIUM 5000 UNIT(S): 5000 INJECTION INTRAVENOUS; SUBCUTANEOUS at 18:08

## 2018-05-18 RX ADMIN — Medication 2: at 18:09

## 2018-05-18 RX ADMIN — OXYCODONE AND ACETAMINOPHEN 1 TABLET(S): 5; 325 TABLET ORAL at 10:51

## 2018-05-18 RX ADMIN — Medication 1: at 08:01

## 2018-05-18 RX ADMIN — HEPARIN SODIUM 5000 UNIT(S): 5000 INJECTION INTRAVENOUS; SUBCUTANEOUS at 05:18

## 2018-05-18 NOTE — PROGRESS NOTE ADULT - ATTENDING COMMENTS
I have seen and examined the patient, reviewed the chart, labs and diagnostics and I agree with assessment and plan as above with family medicine resident Ilya Henry    Had HD today  Pending DAPHNE.  Pt is medically stable for D/C to D[facility]NE

## 2018-05-18 NOTE — DISCHARGE NOTE ADULT - MEDICATION SUMMARY - MEDICATIONS TO STOP TAKING
I will STOP taking the medications listed below when I get home from the hospital:    simvastatin 40 mg oral tablet  -- 1 tab(s) by mouth once a day (at bedtime)    cloNIDine 0.3 mg oral tablet  -- 1 tab(s) by mouth 2 times a day    carvedilol 12.5 mg oral tablet  -- 1 tab(s) by mouth every 12 hours    HumaLOG 100 units/mL injectable solution  -- : 1 unit if glucose 151-200; 2 unit if 201-250; 3 unit if 251-300; 4 unit if 301-350; 5 unit if 351-400; 6 unit if 400+: call MD    hydrALAZINE 50 mg oral tablet  -- 1 tab(s) by mouth every 8 hours    amLODIPine 5 mg oral tablet  -- 1 tab(s) by mouth once a day    losartan-hydroCHLOROthiazide 100 mg-25 mg oral tablet  -- 1 tab(s) by mouth once a day    ferrous sulfate 159 mg oral capsule, extended release  -- 1 cap(s) by mouth 2 times a day I will STOP taking the medications listed below when I get home from the hospital:  None

## 2018-05-18 NOTE — DISCHARGE NOTE ADULT - HOSPITAL COURSE
Ms. Elizabeth is a 59yo F with PMH HTN, HLD, DMII not on insulin at home, ESRD on HD M/W/F, hyponatremia, admitted for evaluation and management of chest pain. Patient found to have a pericardial effusion on TTE for which the patient was treated with colchicine and ibuprofen to decrease any inflammation, with complete resolution of symptoms. Patient further underwent an ischemic evaluation after having elevated troponin and a drop in EF to 40-45%, form 55-60% 2 months. patient underwent cardiac catheretization without intervention and found to have on-ischemic cardiomyopathy which was managed medically. Patient's troponin were close to her baseline and likely secondary to fluid overload caused by ESRD. Patient's hospital course was complicated by an episode of hypotension for which the patient did not complete dialysis that day.  Patient was on extensive list of antihypertensives prior to admission which were discontinued and patient's BP remained stable throughput the remaining hospital course, BP meds to be held on discharge. Course also included abnormal thyroid function tests which are to be repeated as an outpatient as patient is asymptomatic. Patient's insulin regimen was also adjusted for optimal blood sugar control. Patient was further evaluated by the Electrophysiologist for her paroxysmal atrial fibrillation and patient will follow-up an outpatient for consideration of possible Watchman's Procedure. Patient will no longer be taking coumadin or AC due to previous GI bleed. Patient's statin was also held due to elevated LFTs, which are to be followed as an outpatient. Pt has H/O Paroxysmal A fib, currently in NSR. Not on anticoagulation because of recent GI bleed. Pt and family aware of risks and Benefits. Follow up with cardiology Outpatient for watchman's procedure once acute pericarditis resolves. Patient is now asymptomatic, with BP and blood sugars well-controlled. Patient is medically optimized for discharge at this time.     Disposition : Home with Home health care   Total Time spent on Discharge: 45 minutes, more than 50 % time spent on counselling and co-ordination of patient's care Ms. Elizabeth is a 57yo F with PMH HTN, HLD, DMII not on insulin at home, ESRD on HD M/W/F, hyponatremia, presenting with weakness and S/P fall. Pt was initially discharged 2 days ago from [facility] after extensive workup. She was admitted for evaluation and management of chest pain. Patient found to have a pericardial effusion on TTE for which the patient was treated with colchicine and ibuprofen to decrease any inflammation, with complete resolution of symptoms. Patient further underwent an ischemic evaluation after having elevated troponin and a drop in EF to 40-45%, form 55-60% 2 months. patient underwent cardiac catheretization without intervention and found to have on-ischemic cardiomyopathy which was managed medically. Patient's troponin were close to her baseline and likely secondary to fluid overload caused by ESRD. Patient's hospital course was complicated by an episode of hypotension for which the patient did not complete dialysis that day.  Patient was on extensive list of antihypertensives prior to admission which were discontinued and patient's BP remained stable throughput the remaining hospital course, BP meds to be held on discharge. Course also included abnormal thyroid function tests which are to be repeated as an outpatient as patient is asymptomatic. Patient's insulin regimen was also adjusted for optimal blood sugar control. Patient was further evaluated by the Electrophysiologist for her paroxysmal atrial fibrillation and patient will follow-up an outpatient for consideration of possible Watchman's Procedure. Patient will no longer be taking coumadin or AC due to previous GI bleed. Patient's statin was also held due to elevated LFTs, which are to be followed as an outpatient. Pt has H/O Paroxysmal A fib, currently in NSR. Not on anticoagulation because of recent GI bleed. Pt and family aware of risks and Benefits. Follow up with cardiology Outpatient for watchman's procedure once acute pericarditis resolves. Patient is now asymptomatic, with BP and blood sugars well-controlled. Patient seen bu physical therapy and sub acute rehab subsequently recommended. Patient is medically optimized for discharge at this time.     Disposition : [facility]  Total Time spent on Discharge: 45 minutes, more than 50 % time spent on counselling and co-ordination of patient's care Ms. Elizabeth is a 57yo F with PMH HTN, HLD, DMII not on insulin at home, ESRD on HD M/W/F, hyponatremia, presenting with weakness and S/P fall. Pt was initially discharged 2 days ago from [facility] after extensive workup. She was admitted for evaluation and management of chest pain. Patient found to have a pericardial effusion on TTE for which the patient was treated with colchicine and ibuprofen to decrease any inflammation, with complete resolution of symptoms. Patient further underwent an ischemic evaluation after having elevated troponin and a drop in EF to 40-45%, form 55-60% 2 months. patient underwent cardiac catheretization without intervention and found to have on-ischemic cardiomyopathy which was managed medically. Patient's troponin were close to her baseline and likely secondary to fluid overload caused by ESRD. Patient's hospital course was complicated by an episode of hypotension for which the patient did not complete dialysis that day.  Patient was on extensive list of antihypertensives prior to admission which were discontinued and patient's BP remained stable throughput the remaining hospital course, BP meds to be held on discharge. Course also included abnormal thyroid function tests which are to be repeated as an outpatient as patient is asymptomatic. Patient's insulin regimen was also adjusted for optimal blood sugar control. Patient was further evaluated by the Electrophysiologist for her paroxysmal atrial fibrillation and patient will follow-up an outpatient for consideration of possible Watchman's Procedure. Patient will no longer be taking coumadin or AC due to previous GI bleed. Patient's statin was also held due to elevated LFTs, which are to be followed as an outpatient. Pt has H/O Paroxysmal A fib, currently in NSR. Not on anticoagulation because of recent GI bleed. Pt and family aware of risks and Benefits. Follow up with cardiology Outpatient for watchman's procedure once acute pericarditis resolves. Patient is now asymptomatic, with BP and blood sugars well-controlled. Pt also had a Left Superior Rami Fracture noted this admission. Orthopedic was consulted and recommended OOB, PT, WBAT with Walker with pain medication as needed for comfort. Patient seen by physical therapy and sub acute rehab subsequently recommended. Patient is medically optimized for discharge at this time.     Disposition : [facility]  Total Time spent on Discharge: 45 minutes, more than 50 % time spent on counselling and co-ordination of patient's care

## 2018-05-18 NOTE — PROGRESS NOTE ADULT - ASSESSMENT
ESRD - HD today , + developing L arm access .     Anemia - Hgb stable w/o Epogen .     L pubic ramus fracture - Pt reports pain with leg movement (  )   Debby saw the pt and reports no need for intervention   + Pain ---> Given 1 percocet in HD with good resolve   Add Percocet 1 Q 8 h   Add Colace     RO - Phos OK w/o binders     H/P pericardial effusion -  s/p drainage , Trivial on repeat ECHO 5-14. Once a day Colcrys .     Elevated CPK - better , statin held .     HTN - prior meds held due to low BP . Watch trend .

## 2018-05-18 NOTE — DISCHARGE NOTE ADULT - ADDITIONAL INSTRUCTIONS
Please follow-up with your Nephrologist remember to attend all dialysis sessions as scheduled  Please follow-up with your PMD in 3-5 days to review all medications  Please follow-up with your Cardiologist in 2 weeks.  Please follow-up with the Electrophysiologist to discuss your atrial fibrillation. On this admission, you had a Left Superior Rami Fracture. Orthopedic was consulted and recommended OOB, PT, WBAT with Walker, Pain medication as needed for comfort.  Please follow up with orthopedic surgery on discharge.  Please follow-up with your Nephrologist remember to attend all dialysis sessions as scheduled  Please follow-up with your PMD in 3-5 days to review all medications  Please follow-up with your Cardiologist in 2 weeks.  Please follow-up with the Electrophysiologist to discuss your atrial fibrillation.

## 2018-05-18 NOTE — DISCHARGE NOTE ADULT - MEDICATION SUMMARY - MEDICATIONS TO TAKE
I will START or STAY ON the medications listed below when I get home from the hospital:     mg oral tablet  -- 1 tab(s) by mouth every 6 hours, As needed, Moderate pain  -- Indication: For Pericardial effusion    aspirin 81 mg oral delayed release tablet  -- 1 tab(s) by mouth once a day  -- Indication: For CAD    insulin glargine 100 units/mL subcutaneous solution  -- 16 unit(s) subcutaneous once a day (at bedtime)   -- Do not drink alcoholic beverages when taking this medication.  It is very important that you take or use this exactly as directed.  Do not skip doses or discontinue unless directed by your doctor.  Keep in refrigerator.  Do not freeze.    -- Indication: For DM (diabetes mellitus)    HumaLOG 100 units/mL injectable solution  -- 3 unit(s) injectable 3 times a day (before meals)   -- Indication: For DM (diabetes mellitus)    Colcrys 0.6 mg oral tablet  -- 1 tab(s) by mouth once a day  -- Indication: For Pericardial effusion    FeroSul 325 mg (65 mg elemental iron) oral tablet  -- 1 tab(s) by mouth once a day   -- Indication: For Anemia     Colace 100 mg oral capsule  -- 1 cap(s) by mouth 2 times a day, As Needed -for constipation   -- Indication: For Constipation    senna oral tablet  -- 2 tab(s) by mouth once a day (at bedtime), As needed, Constipation  -- Indication: For Constipation I will START or STAY ON the medications listed below when I get home from the hospital:     mg oral tablet  -- 1 tab(s) by mouth every 6 hours, As needed, Moderate pain  -- Indication: For pain    aspirin 81 mg oral delayed release tablet  -- 1 tab(s) by mouth once a day  -- Indication: For CAD    insulin glargine 100 units/mL subcutaneous solution  -- 16 unit(s) subcutaneous once a day (at bedtime)   -- Do not drink alcoholic beverages when taking this medication.  It is very important that you take or use this exactly as directed.  Do not skip doses or discontinue unless directed by your doctor.  Keep in refrigerator.  Do not freeze.    -- Indication: For diabetes    HumaLOG 100 units/mL injectable solution  -- 3 unit(s) injectable 3 times a day (before meals)   -- Indication: For diabetes    Colcrys 0.6 mg oral tablet  -- 1 tab(s) by mouth once a day  -- Indication: For gout    Coreg 12.5 mg oral tablet  -- 1 tab(s) by mouth 2 times a day   -- It is very important that you take or use this exactly as directed.  Do not skip doses or discontinue unless directed by your doctor.  May cause drowsiness.  Alcohol may intensify this effect.  Use care when operating dangerous machinery.  Some non-prescription drugs may aggravate your condition.  Read all labels carefully.  If a warning appears, check with your doctor before taking.  Take with food or milk.    -- Indication: For hypertension    FeroSul 325 mg (65 mg elemental iron) oral tablet  -- 1 tab(s) by mouth once a day   -- Indication: For anemia    Colace 100 mg oral capsule  -- 1 cap(s) by mouth 2 times a day, As Needed -for constipation   -- Indication: For constipation    senna oral tablet  -- 2 tab(s) by mouth once a day (at bedtime), As needed, Constipation  -- Indication: For constipation

## 2018-05-18 NOTE — PROGRESS NOTE ADULT - SUBJECTIVE AND OBJECTIVE BOX
NEPHROLOGY INTERVAL HPI/OVERNIGHT EVENTS:    Seen at HD   C/O some lower back and pelvic pain   No issues with permacath         MEDICATIONS  (STANDING):  aspirin enteric coated 81 milliGRAM(s) Oral daily  colchicine 0.6 milliGRAM(s) Oral daily  dextrose 5%. 1000 milliLiter(s) (50 mL/Hr) IV Continuous <Continuous>  dextrose 50% Injectable 12.5 Gram(s) IV Push once  dextrose 50% Injectable 25 Gram(s) IV Push once  dextrose 50% Injectable 25 Gram(s) IV Push once  ferrous    sulfate 325 milliGRAM(s) Oral daily  heparin  Injectable 5000 Unit(s) SubCutaneous every 12 hours  insulin glargine Injectable (LANTUS) 16 Unit(s) SubCutaneous at bedtime  insulin lispro (HumaLOG) corrective regimen sliding scale   SubCutaneous three times a day before meals    MEDICATIONS  (PRN):  dextrose 40% Gel 15 Gram(s) Oral once PRN Blood Glucose LESS THAN 70 milliGRAM(s)/deciliter  docusate sodium 100 milliGRAM(s) Oral two times a day PRN Constipation  glucagon  Injectable 1 milliGRAM(s) IntraMuscular once PRN Glucose LESS THAN 70 milligrams/deciliter  senna 2 Tablet(s) Oral at bedtime PRN Constipation      Allergies    No Known Allergies    Intolerances              Vital Signs Last 24 Hrs  T(C): 36.8 (18 May 2018 08:22), Max: 37.1 (18 May 2018 07:55)  T(F): 98.2 (18 May 2018 08:22), Max: 98.8 (18 May 2018 07:55)  HR: 89 (18 May 2018 08:22) (80 - 98)  BP: 166/86 (18 May 2018 08:22) (134/83 - 166/86)  BP(mean): --  RR: 18 (18 May 2018 08:22) (16 - 19)  SpO2: 98% (18 May 2018 08:22) (95% - 98%)  Daily Height in cm: 152.4 (17 May 2018 12:56)    Daily Weight in k.2 (18 May 2018 08:22)  I&O's Detail    I&O's Summary      PHYSICAL EXAM:  EYES:  conjunctiva and sclera clear  NECK: Supple, No JVD/Carotid Bruit -ve   NERVOUS SYSTEM:  A/O x3, LE power 3/5; UE 4/5; Plantars downgoing  Lungs : No rales, No rhonchi,   HEART:  No murmur,  No rub, No gallops  ABDOMEN: Soft, NT/ND BS+  EXTREMITIES:  + Peripheral Pulses, - edema  LABS:                        11.0   7.2   )-----------( 327      ( 18 May 2018 08:25 )             34.3         132<L>  |  92<L>  |  32.0<H>  ----------------------------<  196<H>  4.0   |  26.0  |  3.45<H>    Ca    8.8      18 May 2018 08:25  Phos  3.7       Mg     2.1         TPro  7.3  /  Alb  2.9<L>  /  TBili  0.4  /  DBili  x   /  AST  220<H>  /  ALT  66<H>  /  AlkPhos  114      PT/INR - ( 17 May 2018 14:49 )   PT: 11.0 sec;   INR: 1.00 ratio         PTT - ( 17 May 2018 14:49 )  PTT:30.6 sec    Magnesium, Serum: 2.1 mg/dL ( @ 14:49)  Phosphorus Level, Serum: 3.7 mg/dL ( @ 14:49)          RADIOLOGY & ADDITIONAL TESTS:   EXAM:  PELVIS                          PROCEDURE DATE:  2018          INTERPRETATION:  AP pelvis    History: Trauma    Fracture left superior pubic ramus. Mild distraction at the fracture   site..  Hip articulations appear to be intact.. Soft tissues are normal.    Impression:  Fracture superior pubic ramus      EXAM:  ECHO TRANSTHORACIC;F U OR LTD      PROCEDURE DATE:  May 14 2018   .      INTERPRETATION:  REPORT:    TRANSTHORACIC ECHOCARDIOGRAM REPORT         Patient Name:   JAROCHO MORALES Patient Location: Emergency  Medical Rec #:  [id number]     Accession#:      [id number]  Account #:                   Height:           65.0 in 165.0 cm  YOB: 1960     Weight:           145.9 lb 66.20 kg  Patient Age:    58 years     BSA:              1.73 m²  Patient Gender: F            BP: 118/74 mmHg       Date of Exam:        2018 11:09:02 AM  Sonographer:         Alexandra Thompson  Referring Physician: Wesly Garcia MD    Procedure:   Follow up or Limited Echocardiogram.  Indications: Pericardial effusion (noninflammatory) - I31.3  Diagnosis:   Pericardial effusion (noninflammatory) - I31.3         2D AND M-MODE MEASUREMENTS (normal ranges within parentheses):  Left Ventricle:                 Normal    Aorta/Left Atrium:              Normal  IVSd (2D):              1.07 cm (0.7-1.1) LA Volume Index    38.7 ml/m²  LVPWd (2D):             0.95 cm (0.7-1.1)  LVIDd (2D):             4.65 cm (3.4-5.7)  Relative Wall Thickness 0.41    (<0.42)    LV SYSTOLIC FUNCTION BY 2D PLANIMETRY (MOD):  EF-A4C View: 53.4 % EF-A2C View: 40.6 % EF-Biplane: 45 %    LV DIASTOLIC FUNCTION:  MV Peak E: 0.81 m/s e', MV Josselin: 0.03 m/s  MV Peak A: 1.10 m/s E/e' Ratio: 25.95  E/A Ratio: 0.74    SPECTRAL DOPPLER ANALYSIS (where applicable):  Aortic Valve: AoV Max Darius: 1.60 m/s AoV Peak PG: 10.2 mmHg AoV Mean P.4 mmHg    LVOT Vmax: 0.98 m/s LVOT VTI: 0.179 m LVOT Diameter:    AoV Area, Vmax:  AoV Area, VTI:  AoV Area, Vmn:  Ao VTI: 0.304     PHYSICIAN INTERPRETATION:  Left Ventricle: The left ventricular internal cavity size is normal. Left   ventricular wall thickness is normal.  Global LV systolic function was mildly decreased. Left ventricular   ejection fraction, by visual estimation, is 45 to 50%. Spectral Doppler   shows impaired relaxation pattern of left ventricular myocardial filling   (Grade I diastolic dysfunction).  Right Ventricle: Normal right ventricular size and function.  Left Atrium: Mildly enlarged left atrium.  Right Atrium: The right atrium is normal in size.  Pericardium: Trivial pericardial effusion is present. There is a moderate   pleural effusion in both left and right lateral regions.  Mitral Valve: Thickening of the anterior and posterior mitral valve   leaflets. Mild mitral valve regurgitation is seen.  Tricuspid Valve: Structurally normal tricuspid valve, with normal leaflet   excursion. Trivial tricuspid regurgitation is visualized. Adequate TR   velocity was not obtained to accurately assess RVSP.  Aortic Valve: The aortic valve is trileaflet. No evidence of aortic valve   regurgitation is seen.  Pulmonic Valve: Structurally normal pulmonic valve, with normal leaflet   excursion. Trace pulmonic valve regurgitation.  Aorta: The aorta was not well visualized.  Pulmonary Artery: The main pulmonary artery is normal in size.  Venous: The inferior vena cava was normal sized, with respiratory size   variation greater than 50%.       Summary:   1. Left ventricular ejection fraction, by visual estimation, is 45 to   50%.   2. Mildly decreased global left ventricular systolic function.   3. Spectral Doppler shows impaired relaxation pattern of left   ventricular myocardial filling (Grade I diastolic dysfunction).   4. Moderate pleural effusion in both left and right lateral regions.   5. Trivial pericardial effusion.   6. Thickening of the anterior and posterior mitral valve leaflets.   7. Limited follow up echo for pericardial effusion. Echo done on 3/4/18   moderate pericardial effusion seen.    [id number] Bryce Mario MD, Electronically signed on 2018 at 1:29:07 PM

## 2018-05-18 NOTE — PROGRESS NOTE ADULT - SUBJECTIVE AND OBJECTIVE BOX
Patient is a 58y old  Female who presents with a chief complaint of dark stools (17 May 2018 19:37)      INTERVAL HPI/OVERNIGHT EVENTS:  58y  Female seen and examined at bedside with no acute events overnight. Pt reports that her pain is  controlled, is tolerating PO intake, is voiding, + bowel movement. Pt denies fever, chills, HA, SOB, CP, abd pain, nausea, vomiting, calf pain.      Vital Signs Last 24 Hrs  T(C): 36.7 (18 May 2018 00:22), Max: 36.9 (17 May 2018 12:56)  T(F): 98.1 (18 May 2018 00:22), Max: 98.5 (17 May 2018 12:56)  HR: 81 (18 May 2018 00:22) (80 - 98)  BP: 147/83 (18 May 2018 00:22) (134/83 - 148/89)  BP(mean): --  RR: 18 (18 May 2018 00:22) (16 - 18)  SpO2: 98% (18 May 2018 00:22) (95% - 98%)      Daily Height in cm: 152.4 (17 May 2018 12:56)    Daily   I&O's Detail        REVIEW OF SYSTEMS:    Noted above      PHYSICAL EXAM:    GENERAL: NAD, well-groomed, well-developed  HEAD:  Atraumatic, Normocephalic  ENMT: Moist mucous membranes  NECK: Supple  NERVOUS SYSTEM:  Alert & Oriented X3, Good concentration; Motor Strength 5/5 B/L upper  CHEST/LUNG: Clear to percussion bilaterally; No rales, rhonchi, wheezing, or rubs  HEART: S1 S2, Regular rate and rhythm; No murmurs, rubs, or gallops  ABDOMEN: Soft, Nontender, Nondistended; Bowel sounds present  EXTREMITIES:  2+ Peripheral Pulses, No clubbing, cyanosis, or edema  SKIN: No rashes or lesions      LABS:                        11.9   7.2   )-----------( 297      ( 17 May 2018 14:49 )             36.0     CBC Full  -  ( 17 May 2018 14:49 )  WBC Count : 7.2 K/uL  Hemoglobin : 11.9 g/dL  Hematocrit : 36.0 %  Platelet Count - Automated : 297 K/uL  Mean Cell Volume : 87.4 fl  Mean Cell Hemoglobin : 28.9 pg  Mean Cell Hemoglobin Concentration : 33.1 g/dL  Auto Neutrophil # : 5.6 K/uL  Auto Lymphocyte # : 1.0 K/uL  Auto Monocyte # : 0.5 K/uL  Auto Eosinophil # : 0.0 K/uL  Auto Basophil # : 0.0 K/uL  Auto Neutrophil % : 77.4 %  Auto Lymphocyte % : 14.3 %  Auto Monocyte % : 7.5 %  Auto Eosinophil % : 0.4 %  Auto Basophil % : 0.3 %    05-17    132<L>  |  91<L>  |  27.0<H>  ----------------------------<  220<H>  4.6   |  27.0  |  3.30<H>    Ca    9.3      17 May 2018 14:49  Phos  3.7     05-17  Mg     2.1     05-17    TPro  7.3  /  Alb  2.9<L>  /  TBili  0.4  /  DBili  x   /  AST  220<H>  /  ALT  66<H>  /  AlkPhos  114  05-17    PT/INR - ( 17 May 2018 14:49 )   PT: 11.0 sec;   INR: 1.00 ratio         PTT - ( 17 May 2018 14:49 )  PTT:30.6 sec    Hemoglobin A1C, Whole Blood: 8.5 % (05-14 @ 07:07)  Hemoglobin A1C, Whole Blood: 7.5 % (03-14 @ 11:12)    Culture Results:   50,000 - 99,000 CFU/mL Coag Negative Staphylococcus (05-13 @ 21:42)        Albumin, Serum: 2.9 g/dL (05-17 @ 14:49)    LIVER FUNCTIONS - ( 17 May 2018 14:49 )  Alb: 2.9 g/dL / Pro: 7.3 g/dL / ALK PHOS: 114 U/L / ALT: 66 U/L / AST: 220 U/L / GGT: x             RADIOLOGY & ADDITIONAL TESTS:      ANTIMICROBIAL:    CARDIOVASCULAR:    PULMONARY:    NEUROLOGIC:    ONCOLOGIC:    HEMATOLOGIC:  aspirin enteric coated 81 milliGRAM(s) Oral daily  heparin  Injectable 5000 Unit(s) SubCutaneous every 12 hours    GATROINTESTINAL:  docusate sodium 100 milliGRAM(s) Oral two times a day PRN  senna 2 Tablet(s) Oral at bedtime PRN     MEDS:    ENDO/METABOLIC:  colchicine 0.6 milliGRAM(s) Oral daily  dextrose 40% Gel 15 Gram(s) Oral once PRN  dextrose 50% Injectable 12.5 Gram(s) IV Push once  dextrose 50% Injectable 25 Gram(s) IV Push once  dextrose 50% Injectable 25 Gram(s) IV Push once  glucagon  Injectable 1 milliGRAM(s) IntraMuscular once PRN  insulin glargine Injectable (LANTUS) 16 Unit(s) SubCutaneous at bedtime  insulin lispro (HumaLOG) corrective regimen sliding scale   SubCutaneous three times a day before meals    IV FLUID/NUTRITION:  dextrose 5%. 1000 milliLiter(s) IV Continuous <Continuous>  ferrous    sulfate 325 milliGRAM(s) Oral daily    TOPICAL:    IMMUNOLOGIC & OTHER      Allergies    No Known Allergies    Intolerances

## 2018-05-18 NOTE — DISCHARGE NOTE ADULT - PLAN OF CARE
Monitor You had some chest pain likely due to the pericardial effusion which is a small collection of fluid around the heart and was found on the ultrasound of your heart (TTE). Please take the colchicine and Ibuprofen as prescribed. Please follow-up with the Cardiologist in 2 weeks. Continue following a renal diet as advised by your Nephrologist. If you have been started on any medications for your kidney function, be sure to take them regularly. Be sure to follow up and have dialysis as scheduled, if it very important that you do not miss sessions! If something comes up and you feel that you cannot make a session, please alert your nephrologist immediately. Follow a low carb low sugar diet. Continue to take all antidiabetic medications/insulin as prescribed. Follow up with your Primary Care Doctor regularly for blood sugar/A1c checks. Be sure to see an eye doctor and foot doctor on an annual basis. You did not have any concerning episodes of atrial fibrillation in the hospital. Please make sure to follow-up with Dr. Stephens, the Electrophysiologist as an outpatient. Please do not take coumadin or Anticoagulation due to previous GI bleed. follow up with your Primary Care Doctor to review your blood work regularly. Be sure to alert a medical professional immediately if you have symptoms of acute or worsening anemia including but not limited to the following: lightheadedness, dizziness, paleness, palpitations, shortness of breath, abnormal bleeding. BP<150/90 Be sure to follow a low salt diet. If you have been prescribed antihypertensive medications to control your blood pressure, be sure to take them every day as prescribed and do not miss any doses, the medications do not work if they are not taken consistently. Follow up with your Primary Care Doctor and have your Blood Pressure checked. Follow-up You were noticed to have thyroid and liver blood work with some abnormalities. Your statin medication was held due to elevated liver enzymes. At this time, you are safe to leave the hospital, we do not suspect that anything immediately will come of these findings, but it is something that should be followed to see if it is getting better, staying the same, or getting worse. Be sure to discuss this at your follow up visit with your Primary Care Doctor to have these tests repeated and to see what work up, if any, is necessary to continue managing it. Resolution You had sustained fall on admission likely due to weakness from comorbidity. You will now be discharged to rehab to help you get optimized. You had some chest pain earlier likely due to the pericardial effusion which is a small collection of fluid around the heart and was found on the ultrasound of your heart (TTE). Please take the colchicine and Ibuprofen as prescribed. Please follow-up with the Cardiologist in 2 weeks.

## 2018-05-18 NOTE — DISCHARGE NOTE ADULT - CARE PLAN
Principal Discharge DX:	Pericardial effusion  Goal:	Monitor  Assessment and plan of treatment:	You had some chest pain likely due to the pericardial effusion which is a small collection of fluid around the heart and was found on the ultrasound of your heart (TTE). Please take the colchicine and Ibuprofen as prescribed. Please follow-up with the Cardiologist in 2 weeks.  Secondary Diagnosis:	End stage renal disease on dialysis  Goal:	Monitor  Assessment and plan of treatment:	Continue following a renal diet as advised by your Nephrologist. If you have been started on any medications for your kidney function, be sure to take them regularly. Be sure to follow up and have dialysis as scheduled, if it very important that you do not miss sessions! If something comes up and you feel that you cannot make a session, please alert your nephrologist immediately.  Secondary Diagnosis:	Type 2 diabetes mellitus with hyperglycemia, with long-term current use of insulin  Goal:	Monitor  Assessment and plan of treatment:	Follow a low carb low sugar diet. Continue to take all antidiabetic medications/insulin as prescribed. Follow up with your Primary Care Doctor regularly for blood sugar/A1c checks. Be sure to see an eye doctor and foot doctor on an annual basis.  Secondary Diagnosis:	Paroxysmal atrial fibrillation  Goal:	Monitor  Assessment and plan of treatment:	You did not have any concerning episodes of atrial fibrillation in the hospital. Please make sure to follow-up with Dr. Stephens, the Electrophysiologist as an outpatient. Please do not take coumadin or Anticoagulation due to previous GI bleed.  Secondary Diagnosis:	Anemia of chronic disease  Goal:	Monitor  Assessment and plan of treatment:	follow up with your Primary Care Doctor to review your blood work regularly. Be sure to alert a medical professional immediately if you have symptoms of acute or worsening anemia including but not limited to the following: lightheadedness, dizziness, paleness, palpitations, shortness of breath, abnormal bleeding.  Secondary Diagnosis:	Essential hypertension  Goal:	BP<150/90  Assessment and plan of treatment:	Be sure to follow a low salt diet. If you have been prescribed antihypertensive medications to control your blood pressure, be sure to take them every day as prescribed and do not miss any doses, the medications do not work if they are not taken consistently. Follow up with your Primary Care Doctor and have your Blood Pressure checked.  Secondary Diagnosis:	Abnormal thyroid function test  Goal:	Follow-up  Assessment and plan of treatment:	You were noticed to have thyroid and liver blood work with some abnormalities. Your statin medication was held due to elevated liver enzymes. At this time, you are safe to leave the hospital, we do not suspect that anything immediately will come of these findings, but it is something that should be followed to see if it is getting better, staying the same, or getting worse. Be sure to discuss this at your follow up visit with your Primary Care Doctor to have these tests repeated and to see what work up, if any, is necessary to continue managing it. Principal Discharge DX:	Pericardial effusion  Goal:	Resolution  Assessment and plan of treatment:	You had sustained fall on admission likely due to weakness from comorbidity. You will now be discharged to rehab to help you get optimized. You had some chest pain earlier likely due to the pericardial effusion which is a small collection of fluid around the heart and was found on the ultrasound of your heart (TTE). Please take the colchicine and Ibuprofen as prescribed. Please follow-up with the Cardiologist in 2 weeks.  Secondary Diagnosis:	End stage renal disease on dialysis  Goal:	Monitor  Assessment and plan of treatment:	Continue following a renal diet as advised by your Nephrologist. If you have been started on any medications for your kidney function, be sure to take them regularly. Be sure to follow up and have dialysis as scheduled, if it very important that you do not miss sessions! If something comes up and you feel that you cannot make a session, please alert your nephrologist immediately.  Secondary Diagnosis:	Type 2 diabetes mellitus with hyperglycemia, with long-term current use of insulin  Goal:	Monitor  Assessment and plan of treatment:	Follow a low carb low sugar diet. Continue to take all antidiabetic medications/insulin as prescribed. Follow up with your Primary Care Doctor regularly for blood sugar/A1c checks. Be sure to see an eye doctor and foot doctor on an annual basis.  Secondary Diagnosis:	Paroxysmal atrial fibrillation  Goal:	Monitor  Assessment and plan of treatment:	You did not have any concerning episodes of atrial fibrillation in the hospital. Please make sure to follow-up with Dr. Stephens, the Electrophysiologist as an outpatient. Please do not take coumadin or Anticoagulation due to previous GI bleed.  Secondary Diagnosis:	Anemia of chronic disease  Goal:	Monitor  Assessment and plan of treatment:	follow up with your Primary Care Doctor to review your blood work regularly. Be sure to alert a medical professional immediately if you have symptoms of acute or worsening anemia including but not limited to the following: lightheadedness, dizziness, paleness, palpitations, shortness of breath, abnormal bleeding.  Secondary Diagnosis:	Essential hypertension  Goal:	BP<150/90  Assessment and plan of treatment:	Be sure to follow a low salt diet. If you have been prescribed antihypertensive medications to control your blood pressure, be sure to take them every day as prescribed and do not miss any doses, the medications do not work if they are not taken consistently. Follow up with your Primary Care Doctor and have your Blood Pressure checked.  Secondary Diagnosis:	Abnormal thyroid function test  Goal:	Follow-up  Assessment and plan of treatment:	You were noticed to have thyroid and liver blood work with some abnormalities. Your statin medication was held due to elevated liver enzymes. At this time, you are safe to leave the hospital, we do not suspect that anything immediately will come of these findings, but it is something that should be followed to see if it is getting better, staying the same, or getting worse. Be sure to discuss this at your follow up visit with your Primary Care Doctor to have these tests repeated and to see what work up, if any, is necessary to continue managing it.

## 2018-05-18 NOTE — PROGRESS NOTE ADULT - ASSESSMENT
The patient is a 58 year old female with a history of ESRD admitted for left superior rami fracture. As per Ortho, no intervention required and PT recommends DAPHNE. Pt is due for HD this AM as per Nephro    1. Left Superior Rami Fracture.   Orhto: no intervention, OOB, WbAT with walker  PT: DAPHNE on d/c      2. ESRD On HD MWF:   EGFR and BUN/Cr noted. As per Nephro, HD this AM    3. REcent pericardial effusion:   Stable.  C/w Colcrys    4. DM type 2:   C/w HISS, FS, Lantus 16 QHS    5. Hypertension:   controlled. BP rx held 2/2 Hypotension. Will monitor BP    6. HLD:   held statin 2/2 elevated AST ALT. Repeat labs pending    DVT PPx  heparin  Injectable 5000 Unit(s) SubCutaneous every 12 hours The patient is a 58 year old female with a history of ESRD admitted after Fall at Home. Work up showed  left superior rami fracture. As per Ortho, no intervention required and PT recommends DAPHNE, waiting for DAPHNE placement     1. Left Superior Rami Fracture, S/P mechanical fall at Home   Orhto: no intervention, OOB, WbAT with walker  PT: DAPHNE on d/c  social work working on DAPHNE placement     2. ESRD On HD MWF:   EGFR and BUN/Cr noted. As per Nephro, HD this AM    3. Recent pericardial effusion ( Mild ) with pericarditis   Stable.  C/w Colchicine     4. DM type 2: with hyperglycemia   C/w HISS, FS, Lantus 16 QHS  Hypoglycemia Protocol in place     5. Renovascular Hypertension:   controlled. BP rx held 2/2 Hypotension.   continue to Monitor off of Bp medications     DVT PPx  heparin  Injectable 5000 Unit(s) Subcutaneous every 12 hours The patient is a 58 year old female with a history of ESRD admitted after Fall at Home. Work up showed  left superior rami fracture. As per Ortho, no intervention required and PT recommends DAPHNE, waiting for DAPHNE placement     1. Left Superior Rami Fracture, S/P mechanical fall at Home   Orhto: no intervention, OOB, WbAT with walker  PT: DAPHNE on d/c  social work working on DAPHNE placement     2. ESRD On HD MWF:   EGFR and BUN/Cr noted. As per Nephro, HD this AM    3. Recent pericardial effusion ( Mild ) with pericarditis   Stable.  C/w Colchicine     4. DM type 2: with hyperglycemia   C/w HISS, FS, Lantus 16 QHS  Hypoglycemia Protocol in place     5. Renovascular Hypertension:   controlled. Initially BP rx held 2/2 Hypotension.   start Metoprolol 25 mg ER QD and Monitor BP's over 24 hrs     DVT PPx  heparin  Injectable 5000 Unit(s) Subcutaneous every 12 hours

## 2018-05-18 NOTE — DISCHARGE NOTE ADULT - CARE PROVIDER_API CALL
Camille Lopez), Family Medicine  [address] [place] [address]  Phone: [phone]  Fax: [phone]    Srini Gay (MD), Internal Medicine; Nephrology  [address] [place] [id number]  Phone: [phone]  Fax: [phone]    Wesly Garcia), Cardiovascular Disease  [address] [place] [address]  Phone: [phone]  Fax: [phone]    Ming Stephens (MD; MPH), Cardiac Electrophysiology; Cardiovascular Disease; Internal Medicine  [address] [id number]  Phone: [phone]  Fax: [phone]

## 2018-05-18 NOTE — DISCHARGE NOTE ADULT - PATIENT PORTAL LINK FT
You can access the [facility] Patient Portal, offered by [facility], by registering with the following website: [email or web address]

## 2018-05-18 NOTE — DISCHARGE NOTE ADULT - CARE PROVIDERS DIRECT ADDRESSES
,DirectAddress_Unknown,DirectAddress_Unknown,[email or web address]

## 2018-05-23 PROCEDURE — 81001 URINALYSIS AUTO W/SCOPE: CPT

## 2018-05-23 PROCEDURE — 80048 BASIC METABOLIC PNL TOTAL CA: CPT

## 2018-05-23 PROCEDURE — 84436 ASSAY OF TOTAL THYROXINE: CPT

## 2018-05-23 PROCEDURE — 71046 X-RAY EXAM CHEST 2 VIEWS: CPT

## 2018-05-23 PROCEDURE — C1750: CPT

## 2018-05-23 PROCEDURE — 84156 ASSAY OF PROTEIN URINE: CPT

## 2018-05-23 PROCEDURE — 87205 SMEAR GRAM STAIN: CPT

## 2018-05-23 PROCEDURE — 86704 HEP B CORE ANTIBODY TOTAL: CPT

## 2018-05-23 PROCEDURE — 71250 CT THORAX DX C-: CPT

## 2018-05-23 PROCEDURE — 87040 BLOOD CULTURE FOR BACTERIA: CPT

## 2018-05-23 PROCEDURE — 85610 PROTHROMBIN TIME: CPT

## 2018-05-23 PROCEDURE — 86900 BLOOD TYPING SEROLOGIC ABO: CPT

## 2018-05-23 PROCEDURE — 87070 CULTURE OTHR SPECIMN AEROBIC: CPT

## 2018-05-23 PROCEDURE — 83550 IRON BINDING TEST: CPT

## 2018-05-23 PROCEDURE — 82310 ASSAY OF CALCIUM: CPT

## 2018-05-23 PROCEDURE — 82550 ASSAY OF CK (CPK): CPT

## 2018-05-23 PROCEDURE — 99261: CPT

## 2018-05-23 PROCEDURE — 85652 RBC SED RATE AUTOMATED: CPT

## 2018-05-23 PROCEDURE — 84443 ASSAY THYROID STIM HORMONE: CPT

## 2018-05-23 PROCEDURE — 36415 COLL VENOUS BLD VENIPUNCTURE: CPT

## 2018-05-23 PROCEDURE — 93308 TTE F-UP OR LMTD: CPT

## 2018-05-23 PROCEDURE — 84484 ASSAY OF TROPONIN QUANT: CPT

## 2018-05-23 PROCEDURE — 84132 ASSAY OF SERUM POTASSIUM: CPT

## 2018-05-23 PROCEDURE — 86706 HEP B SURFACE ANTIBODY: CPT

## 2018-05-23 PROCEDURE — 74176 CT ABD & PELVIS W/O CONTRAST: CPT

## 2018-05-23 PROCEDURE — 83880 ASSAY OF NATRIURETIC PEPTIDE: CPT

## 2018-05-23 PROCEDURE — 82272 OCCULT BLD FECES 1-3 TESTS: CPT

## 2018-05-23 PROCEDURE — 82553 CREATINE MB FRACTION: CPT

## 2018-05-23 PROCEDURE — 83605 ASSAY OF LACTIC ACID: CPT

## 2018-05-23 PROCEDURE — 83735 ASSAY OF MAGNESIUM: CPT

## 2018-05-23 PROCEDURE — 84466 ASSAY OF TRANSFERRIN: CPT

## 2018-05-23 PROCEDURE — 93306 TTE W/DOPPLER COMPLETE: CPT

## 2018-05-23 PROCEDURE — 83690 ASSAY OF LIPASE: CPT

## 2018-05-23 PROCEDURE — 87075 CULTR BACTERIA EXCEPT BLOOD: CPT

## 2018-05-23 PROCEDURE — 87340 HEPATITIS B SURFACE AG IA: CPT

## 2018-05-23 PROCEDURE — 87798 DETECT AGENT NOS DNA AMP: CPT

## 2018-05-23 PROCEDURE — 84157 ASSAY OF PROTEIN OTHER: CPT

## 2018-05-23 PROCEDURE — 87486 CHLMYD PNEUM DNA AMP PROBE: CPT

## 2018-05-23 PROCEDURE — 83970 ASSAY OF PARATHORMONE: CPT

## 2018-05-23 PROCEDURE — 82306 VITAMIN D 25 HYDROXY: CPT

## 2018-05-23 PROCEDURE — 93990 DOPPLER FLOW TESTING: CPT

## 2018-05-23 PROCEDURE — T1013: CPT

## 2018-05-23 PROCEDURE — 93458 L HRT ARTERY/VENTRICLE ANGIO: CPT

## 2018-05-23 PROCEDURE — 86901 BLOOD TYPING SEROLOGIC RH(D): CPT

## 2018-05-23 PROCEDURE — G0365: CPT

## 2018-05-23 PROCEDURE — C1769: CPT

## 2018-05-23 PROCEDURE — 85730 THROMBOPLASTIN TIME PARTIAL: CPT

## 2018-05-23 PROCEDURE — 96375 TX/PRO/DX INJ NEW DRUG ADDON: CPT

## 2018-05-23 PROCEDURE — 82962 GLUCOSE BLOOD TEST: CPT

## 2018-05-23 PROCEDURE — 89051 BODY FLUID CELL COUNT: CPT

## 2018-05-23 PROCEDURE — 86923 COMPATIBILITY TEST ELECTRIC: CPT

## 2018-05-23 PROCEDURE — 83516 IMMUNOASSAY NONANTIBODY: CPT

## 2018-05-23 PROCEDURE — C1768: CPT

## 2018-05-23 PROCEDURE — 86162 COMPLEMENT TOTAL (CH50): CPT

## 2018-05-23 PROCEDURE — 85027 COMPLETE CBC AUTOMATED: CPT

## 2018-05-23 PROCEDURE — 84480 ASSAY TRIIODOTHYRONINE (T3): CPT

## 2018-05-23 PROCEDURE — 93931 UPPER EXTREMITY STUDY: CPT

## 2018-05-23 PROCEDURE — 93005 ELECTROCARDIOGRAM TRACING: CPT

## 2018-05-23 PROCEDURE — 36430 TRANSFUSION BLD/BLD COMPNT: CPT

## 2018-05-23 PROCEDURE — 82945 GLUCOSE OTHER FLUID: CPT

## 2018-05-23 PROCEDURE — 76775 US EXAM ABDO BACK WALL LIM: CPT

## 2018-05-23 PROCEDURE — 99285 EMERGENCY DEPT VISIT HI MDM: CPT

## 2018-05-23 PROCEDURE — 86850 RBC ANTIBODY SCREEN: CPT

## 2018-05-23 PROCEDURE — 80053 COMPREHEN METABOLIC PANEL: CPT

## 2018-05-23 PROCEDURE — 82435 ASSAY OF BLOOD CHLORIDE: CPT

## 2018-05-23 PROCEDURE — P9016: CPT

## 2018-05-23 PROCEDURE — 70450 CT HEAD/BRAIN W/O DYE: CPT

## 2018-05-23 PROCEDURE — 82746 ASSAY OF FOLIC ACID SERUM: CPT

## 2018-05-23 PROCEDURE — 77001 FLUOROGUIDE FOR VEIN DEVICE: CPT

## 2018-05-23 PROCEDURE — 87086 URINE CULTURE/COLONY COUNT: CPT

## 2018-05-23 PROCEDURE — 83986 ASSAY PH BODY FLUID NOS: CPT

## 2018-05-23 PROCEDURE — 86036 ANCA SCREEN EACH ANTIBODY: CPT

## 2018-05-23 PROCEDURE — 87633 RESP VIRUS 12-25 TARGETS: CPT

## 2018-05-23 PROCEDURE — C1729: CPT

## 2018-05-23 PROCEDURE — 86705 HEP B CORE ANTIBODY IGM: CPT

## 2018-05-23 PROCEDURE — 85014 HEMATOCRIT: CPT

## 2018-05-23 PROCEDURE — 83540 ASSAY OF IRON: CPT

## 2018-05-23 PROCEDURE — 87581 M.PNEUMON DNA AMP PROBE: CPT

## 2018-05-23 PROCEDURE — 83615 LACTATE (LD) (LDH) ENZYME: CPT

## 2018-05-23 PROCEDURE — 80061 LIPID PANEL: CPT

## 2018-05-23 PROCEDURE — 72170 X-RAY EXAM OF PELVIS: CPT

## 2018-05-23 PROCEDURE — 82803 BLOOD GASES ANY COMBINATION: CPT

## 2018-05-23 PROCEDURE — 86160 COMPLEMENT ANTIGEN: CPT

## 2018-05-23 PROCEDURE — 84295 ASSAY OF SERUM SODIUM: CPT

## 2018-05-23 PROCEDURE — 86038 ANTINUCLEAR ANTIBODIES: CPT

## 2018-05-23 PROCEDURE — 84100 ASSAY OF PHOSPHORUS: CPT

## 2018-05-23 PROCEDURE — 97163 PT EVAL HIGH COMPLEX 45 MIN: CPT

## 2018-05-23 PROCEDURE — 88305 TISSUE EXAM BY PATHOLOGIST: CPT

## 2018-05-23 PROCEDURE — 82728 ASSAY OF FERRITIN: CPT

## 2018-05-23 PROCEDURE — 93970 EXTREMITY STUDY: CPT

## 2018-05-23 PROCEDURE — 71045 X-RAY EXAM CHEST 1 VIEW: CPT

## 2018-05-23 PROCEDURE — 99285 EMERGENCY DEPT VISIT HI MDM: CPT | Mod: 25

## 2018-05-23 PROCEDURE — 83036 HEMOGLOBIN GLYCOSYLATED A1C: CPT

## 2018-05-23 PROCEDURE — 82330 ASSAY OF CALCIUM: CPT

## 2018-05-23 PROCEDURE — 88342 IMHCHEM/IMCYTCHM 1ST ANTB: CPT

## 2018-05-23 PROCEDURE — 93971 EXTREMITY STUDY: CPT

## 2018-05-23 PROCEDURE — C1887: CPT

## 2018-05-23 PROCEDURE — 96374 THER/PROPH/DIAG INJ IV PUSH: CPT

## 2018-05-23 PROCEDURE — 76882 US LMTD JT/FCL EVL NVASC XTR: CPT

## 2018-05-23 PROCEDURE — 88112 CYTOPATH CELL ENHANCE TECH: CPT

## 2018-05-23 PROCEDURE — 82947 ASSAY GLUCOSE BLOOD QUANT: CPT

## 2018-05-23 PROCEDURE — 76942 ECHO GUIDE FOR BIOPSY: CPT

## 2018-05-23 PROCEDURE — C1894: CPT

## 2018-05-23 PROCEDURE — 86140 C-REACTIVE PROTEIN: CPT

## 2018-05-23 PROCEDURE — 86803 HEPATITIS C AB TEST: CPT

## 2018-05-23 PROCEDURE — 87102 FUNGUS ISOLATION CULTURE: CPT

## 2018-05-23 PROCEDURE — 85045 AUTOMATED RETICULOCYTE COUNT: CPT

## 2018-05-23 PROCEDURE — 72125 CT NECK SPINE W/O DYE: CPT

## 2018-05-23 PROCEDURE — 80074 ACUTE HEPATITIS PANEL: CPT

## 2018-05-23 PROCEDURE — 72131 CT LUMBAR SPINE W/O DYE: CPT

## 2018-05-23 PROCEDURE — 85018 HEMOGLOBIN: CPT

## 2018-05-23 PROCEDURE — 82042 OTHER SOURCE ALBUMIN QUAN EA: CPT

## 2018-06-01 PROCEDURE — G9001: CPT

## 2018-06-01 PROCEDURE — G9005: CPT

## 2018-06-13 ENCOUNTER — APPOINTMENT (OUTPATIENT)
Dept: CARDIOLOGY | Facility: CLINIC | Age: 58
End: 2018-06-13

## 2018-06-29 ENCOUNTER — APPOINTMENT (OUTPATIENT)
Dept: GASTROENTEROLOGY | Facility: CLINIC | Age: 58
End: 2018-06-29

## 2018-07-01 ENCOUNTER — OUTPATIENT (OUTPATIENT)
Dept: OUTPATIENT SERVICES | Facility: HOSPITAL | Age: 58
LOS: 1 days | End: 2018-07-01
Payer: MEDICARE

## 2018-07-01 DIAGNOSIS — Z49.01 ENCOUNTER FOR FITTING AND ADJUSTMENT OF EXTRACORPOREAL DIALYSIS CATHETER: Chronic | ICD-10-CM

## 2018-07-01 DIAGNOSIS — I77.0 ARTERIOVENOUS FISTULA, ACQUIRED: Chronic | ICD-10-CM

## 2018-07-09 DIAGNOSIS — Z71.89 OTHER SPECIFIED COUNSELING: ICD-10-CM

## 2018-07-18 ENCOUNTER — APPOINTMENT (OUTPATIENT)
Dept: ELECTROPHYSIOLOGY | Facility: CLINIC | Age: 58
End: 2018-07-18

## 2018-08-06 PROBLEM — J90 PLEURAL EFFUSION, NOT ELSEWHERE CLASSIFIED: Chronic | Status: ACTIVE | Noted: 2018-05-14

## 2018-08-06 PROBLEM — E78.5 HYPERLIPIDEMIA, UNSPECIFIED: Chronic | Status: ACTIVE | Noted: 2018-03-13

## 2018-08-06 PROBLEM — I10 ESSENTIAL (PRIMARY) HYPERTENSION: Chronic | Status: ACTIVE | Noted: 2018-03-13

## 2018-08-06 PROBLEM — N18.6 END STAGE RENAL DISEASE: Chronic | Status: ACTIVE | Noted: 2018-05-14

## 2018-08-06 PROBLEM — I31.3 PERICARDIAL EFFUSION (NONINFLAMMATORY): Chronic | Status: ACTIVE | Noted: 2018-05-14

## 2018-08-29 ENCOUNTER — INPATIENT (INPATIENT)
Facility: HOSPITAL | Age: 58
LOS: 9 days | Discharge: HOME | DRG: 291 | End: 2018-09-08
Attending: HOSPITALIST | Admitting: HOSPITALIST
Payer: MEDICARE

## 2018-08-29 ENCOUNTER — RESULT REVIEW (OUTPATIENT)
Age: 58
End: 2018-08-29

## 2018-08-29 VITALS
OXYGEN SATURATION: 92 % | HEART RATE: 95 BPM | SYSTOLIC BLOOD PRESSURE: 161 MMHG | RESPIRATION RATE: 22 BRPM | HEIGHT: 65 IN | WEIGHT: 149.91 LBS | DIASTOLIC BLOOD PRESSURE: 102 MMHG | TEMPERATURE: 98 F

## 2018-08-29 DIAGNOSIS — I77.0 ARTERIOVENOUS FISTULA, ACQUIRED: Chronic | ICD-10-CM

## 2018-08-29 DIAGNOSIS — Z49.01 ENCOUNTER FOR FITTING AND ADJUSTMENT OF EXTRACORPOREAL DIALYSIS CATHETER: Chronic | ICD-10-CM

## 2018-08-29 PROCEDURE — 71046 X-RAY EXAM CHEST 2 VIEWS: CPT | Mod: 26

## 2018-08-29 PROCEDURE — 93010 ELECTROCARDIOGRAM REPORT: CPT

## 2018-08-29 PROCEDURE — 99285 EMERGENCY DEPT VISIT HI MDM: CPT

## 2018-08-29 RX ORDER — IPRATROPIUM/ALBUTEROL SULFATE 18-103MCG
3 AEROSOL WITH ADAPTER (GRAM) INHALATION ONCE
Qty: 0 | Refills: 0 | Status: COMPLETED | OUTPATIENT
Start: 2018-08-29 | End: 2018-08-29

## 2018-08-29 RX ADMIN — Medication 3 MILLILITER(S): at 23:50

## 2018-08-29 NOTE — ED PROVIDER NOTE - PROGRESS NOTE DETAILS
CT surgery for right plueral effusion Discussed need to admit with patient & discussed risk and benefits.  Patient agreed to admission.  Discussed case w/ admitting medicine doctor - agreed to admit to their service.

## 2018-08-29 NOTE — ED ADULT TRIAGE NOTE - CHIEF COMPLAINT QUOTE
pt BIBA pt states that after her dialysis, pt had shortness of breath and coughing up blood and generalized body aches that started monday

## 2018-08-29 NOTE — ED PROVIDER NOTE - MUSCULOSKELETAL, MLM
Spine appears normal, range of motion is not limited, no muscle or joint tenderness.  1+ pitting edema to bilateral lower extremities

## 2018-08-29 NOTE — ED PROVIDER NOTE - MEDICAL DECISION MAKING DETAILS
Pt with DM, HTN, HLD, and ESRD on dialysis, will r/o PNA vs CHF exacerbation vs bronchitis, also r/o anemia and metabolic abnormalities, will obtain labs, CXR, US of LE, and reeval

## 2018-08-29 NOTE — ED PROVIDER NOTE - RESPIRATORY, MLM
Diffuse wheezing to bilateral lung fields Diffuse wheezing to bilateral lung fields, decreased BS right

## 2018-08-29 NOTE — ED PROVIDER NOTE - OBJECTIVE STATEMENT
59 y/o F, with hx of DM, HTN, HLD, ESRD on dialysis M/W/F, presents to the ED c/o cough, onset yesterday.  Pt notes productive cough.  Notes multiple episodes of posttussis emesis containing some blood mixed with sputum.  Also notes generalized weakness.  Pt states that she was sent to the ED from dialysis due to the cough and SOB.  Notes that she completed 4 hours of her dialysis.  Pt states that she still produces some urine. Pt denies fevers/chills, ha, loc, focal neuro deficits, cp/palp, abd pain/n/v/d, urinary symptoms, recent travel and sick contacts. 57 y/o F, with hx of DM, HTN, HLD, ESRD on dialysis M/W/F, presents to the ED c/o cough, onset yesterday.  Pt notes productive cough.  Notes multiple episodes of posttussive emesis containing some blood mixed with sputum.  Also notes generalized weakness.  Pt states that she was sent to the ED from dialysis due to the cough and SOB.  Notes that she completed 4 hours of her dialysis.  Pt states that she still produces some urine. Pt denies fevers/chills, ha, loc, focal neuro deficits, cp/palp, abd pain/n/v/d, urinary symptoms, recent travel and sick contacts.

## 2018-08-30 DIAGNOSIS — J90 PLEURAL EFFUSION, NOT ELSEWHERE CLASSIFIED: ICD-10-CM

## 2018-08-30 DIAGNOSIS — I50.9 HEART FAILURE, UNSPECIFIED: ICD-10-CM

## 2018-08-30 DIAGNOSIS — I50.33 ACUTE ON CHRONIC DIASTOLIC (CONGESTIVE) HEART FAILURE: ICD-10-CM

## 2018-08-30 DIAGNOSIS — R04.2 HEMOPTYSIS: ICD-10-CM

## 2018-08-30 DIAGNOSIS — N18.6 END STAGE RENAL DISEASE: ICD-10-CM

## 2018-08-30 DIAGNOSIS — I48.0 PAROXYSMAL ATRIAL FIBRILLATION: ICD-10-CM

## 2018-08-30 DIAGNOSIS — E11.22 TYPE 2 DIABETES MELLITUS WITH DIABETIC CHRONIC KIDNEY DISEASE: ICD-10-CM

## 2018-08-30 LAB
ALBUMIN SERPL ELPH-MCNC: 3.4 G/DL — SIGNIFICANT CHANGE UP (ref 3.3–5.2)
ALP SERPL-CCNC: 110 U/L — SIGNIFICANT CHANGE UP (ref 40–120)
ALT FLD-CCNC: 9 U/L — SIGNIFICANT CHANGE UP
ANION GAP SERPL CALC-SCNC: 15 MMOL/L — SIGNIFICANT CHANGE UP (ref 5–17)
APTT BLD: 29.2 SEC — SIGNIFICANT CHANGE UP (ref 27.5–37.4)
AST SERPL-CCNC: 22 U/L — SIGNIFICANT CHANGE UP
B PERT IGG+IGM PNL SER: ABNORMAL
BASOPHILS # BLD AUTO: 0 K/UL — SIGNIFICANT CHANGE UP (ref 0–0.2)
BASOPHILS NFR BLD AUTO: 0.2 % — SIGNIFICANT CHANGE UP (ref 0–2)
BILIRUB SERPL-MCNC: 0.6 MG/DL — SIGNIFICANT CHANGE UP (ref 0.4–2)
BUN SERPL-MCNC: 18 MG/DL — SIGNIFICANT CHANGE UP (ref 8–20)
CALCIUM SERPL-MCNC: 8.4 MG/DL — LOW (ref 8.6–10.2)
CHLORIDE SERPL-SCNC: 97 MMOL/L — LOW (ref 98–107)
CK MB CFR SERPL CALC: 4.6 NG/ML — SIGNIFICANT CHANGE UP (ref 0–6.7)
CK SERPL-CCNC: 155 U/L — SIGNIFICANT CHANGE UP (ref 25–170)
CO2 SERPL-SCNC: 26 MMOL/L — SIGNIFICANT CHANGE UP (ref 22–29)
COLOR FLD: YELLOW
CREAT SERPL-MCNC: 2.85 MG/DL — HIGH (ref 0.5–1.3)
EOSINOPHIL # BLD AUTO: 0 K/UL — SIGNIFICANT CHANGE UP (ref 0–0.5)
EOSINOPHIL NFR BLD AUTO: 0.6 % — SIGNIFICANT CHANGE UP (ref 0–6)
FLUID INTAKE SUBSTANCE CLASS: SIGNIFICANT CHANGE UP
FLUID SEGMENTED GRANULOCYTES: 5 % — SIGNIFICANT CHANGE UP
GLUCOSE BLDC GLUCOMTR-MCNC: 138 MG/DL — HIGH (ref 70–99)
GLUCOSE BLDC GLUCOMTR-MCNC: 150 MG/DL — HIGH (ref 70–99)
GLUCOSE BLDC GLUCOMTR-MCNC: 190 MG/DL — HIGH (ref 70–99)
GLUCOSE BLDC GLUCOMTR-MCNC: 207 MG/DL — HIGH (ref 70–99)
GLUCOSE SERPL-MCNC: 183 MG/DL — HIGH (ref 70–115)
GRAM STN FLD: SIGNIFICANT CHANGE UP
HCT VFR BLD CALC: 33.7 % — LOW (ref 37–47)
HGB BLD-MCNC: 10.3 G/DL — LOW (ref 12–16)
INR BLD: 1.22 RATIO — HIGH (ref 0.88–1.16)
LIDOCAIN IGE QN: 15 U/L — LOW (ref 22–51)
LYMPHOCYTES # BLD AUTO: 0.6 K/UL — LOW (ref 1–4.8)
LYMPHOCYTES # BLD AUTO: 10.9 % — LOW (ref 20–55)
LYMPHOCYTES # FLD: 31 % — SIGNIFICANT CHANGE UP
MCHC RBC-ENTMCNC: 29 PG — SIGNIFICANT CHANGE UP (ref 27–31)
MCHC RBC-ENTMCNC: 30.6 G/DL — LOW (ref 32–36)
MCV RBC AUTO: 94.9 FL — SIGNIFICANT CHANGE UP (ref 81–99)
MESOTHL CELL # FLD: 16 % — SIGNIFICANT CHANGE UP
MONOCYTES # BLD AUTO: 0.6 K/UL — SIGNIFICANT CHANGE UP (ref 0–0.8)
MONOCYTES NFR BLD AUTO: 10.7 % — HIGH (ref 3–10)
MONOS+MACROS # FLD: 48 % — SIGNIFICANT CHANGE UP
NEUTROPHILS # BLD AUTO: 4 K/UL — SIGNIFICANT CHANGE UP (ref 1.8–8)
NEUTROPHILS NFR BLD AUTO: 77.4 % — HIGH (ref 37–73)
NRBC # FLD: 1 % — SIGNIFICANT CHANGE UP (ref 0–0)
NT-PROBNP SERPL-SCNC: HIGH PG/ML (ref 0–300)
PH FLD: 8 — SIGNIFICANT CHANGE UP
PLATELET # BLD AUTO: 171 K/UL — SIGNIFICANT CHANGE UP (ref 150–400)
POTASSIUM SERPL-MCNC: 4.1 MMOL/L — SIGNIFICANT CHANGE UP (ref 3.5–5.3)
POTASSIUM SERPL-SCNC: 4.1 MMOL/L — SIGNIFICANT CHANGE UP (ref 3.5–5.3)
PROCALCITONIN SERPL-MCNC: 0.13 NG/ML — HIGH (ref 0–0.04)
PROT SERPL-MCNC: 7.1 G/DL — SIGNIFICANT CHANGE UP (ref 6.6–8.7)
PROTHROM AB SERPL-ACNC: 13.5 SEC — HIGH (ref 9.8–12.7)
RBC # BLD: 3.55 M/UL — LOW (ref 4.4–5.2)
RBC # FLD: 14.9 % — SIGNIFICANT CHANGE UP (ref 11–15.6)
RCV VOL RI: 272 /UL — HIGH (ref 0–5)
SODIUM SERPL-SCNC: 138 MMOL/L — SIGNIFICANT CHANGE UP (ref 135–145)
SPECIMEN SOURCE: SIGNIFICANT CHANGE UP
TOTAL NUCLEATED CELL COUNT, BODY FLUID: 68 /UL — HIGH (ref 0–5)
TROPONIN T SERPL-MCNC: 0.84 NG/ML — HIGH (ref 0–0.06)
TUBE TYPE: SIGNIFICANT CHANGE UP
WBC # BLD: 5.2 K/UL — SIGNIFICANT CHANGE UP (ref 4.8–10.8)
WBC # FLD AUTO: 5.2 K/UL — SIGNIFICANT CHANGE UP (ref 4.8–10.8)

## 2018-08-30 PROCEDURE — 88112 CYTOPATH CELL ENHANCE TECH: CPT | Mod: 26

## 2018-08-30 PROCEDURE — 99221 1ST HOSP IP/OBS SF/LOW 40: CPT

## 2018-08-30 PROCEDURE — 71045 X-RAY EXAM CHEST 1 VIEW: CPT | Mod: 26

## 2018-08-30 PROCEDURE — 99223 1ST HOSP IP/OBS HIGH 75: CPT

## 2018-08-30 PROCEDURE — 71250 CT THORAX DX C-: CPT | Mod: 26

## 2018-08-30 PROCEDURE — 88305 TISSUE EXAM BY PATHOLOGIST: CPT | Mod: 26

## 2018-08-30 PROCEDURE — 93970 EXTREMITY STUDY: CPT | Mod: 26

## 2018-08-30 RX ORDER — ACETAMINOPHEN 500 MG
650 TABLET ORAL EVERY 6 HOURS
Qty: 0 | Refills: 0 | Status: DISCONTINUED | OUTPATIENT
Start: 2018-08-30 | End: 2018-09-08

## 2018-08-30 RX ORDER — LIDOCAINE HCL 20 MG/ML
5 VIAL (ML) INJECTION ONCE
Qty: 0 | Refills: 0 | Status: COMPLETED | OUTPATIENT
Start: 2018-08-30 | End: 2018-08-30

## 2018-08-30 RX ORDER — HEPARIN SODIUM 5000 [USP'U]/ML
5000 INJECTION INTRAVENOUS; SUBCUTANEOUS EVERY 12 HOURS
Qty: 0 | Refills: 0 | Status: DISCONTINUED | OUTPATIENT
Start: 2018-08-30 | End: 2018-09-08

## 2018-08-30 RX ORDER — CEFTRIAXONE 500 MG/1
INJECTION, POWDER, FOR SOLUTION INTRAMUSCULAR; INTRAVENOUS
Qty: 0 | Refills: 0 | Status: DISCONTINUED | OUTPATIENT
Start: 2018-08-30 | End: 2018-09-01

## 2018-08-30 RX ORDER — GLUCAGON INJECTION, SOLUTION 0.5 MG/.1ML
1 INJECTION, SOLUTION SUBCUTANEOUS ONCE
Qty: 0 | Refills: 0 | Status: DISCONTINUED | OUTPATIENT
Start: 2018-08-30 | End: 2018-09-08

## 2018-08-30 RX ORDER — DEXTROSE 50 % IN WATER 50 %
25 SYRINGE (ML) INTRAVENOUS ONCE
Qty: 0 | Refills: 0 | Status: DISCONTINUED | OUTPATIENT
Start: 2018-08-30 | End: 2018-09-08

## 2018-08-30 RX ORDER — SODIUM CHLORIDE 9 MG/ML
1000 INJECTION, SOLUTION INTRAVENOUS
Qty: 0 | Refills: 0 | Status: DISCONTINUED | OUTPATIENT
Start: 2018-08-30 | End: 2018-09-08

## 2018-08-30 RX ORDER — CEFTRIAXONE 500 MG/1
1 INJECTION, POWDER, FOR SOLUTION INTRAMUSCULAR; INTRAVENOUS ONCE
Qty: 0 | Refills: 0 | Status: COMPLETED | OUTPATIENT
Start: 2018-08-30 | End: 2018-08-30

## 2018-08-30 RX ORDER — ASPIRIN/CALCIUM CARB/MAGNESIUM 324 MG
81 TABLET ORAL DAILY
Qty: 0 | Refills: 0 | Status: DISCONTINUED | OUTPATIENT
Start: 2018-08-30 | End: 2018-09-08

## 2018-08-30 RX ORDER — SENNA PLUS 8.6 MG/1
2 TABLET ORAL AT BEDTIME
Qty: 0 | Refills: 0 | Status: DISCONTINUED | OUTPATIENT
Start: 2018-08-30 | End: 2018-09-08

## 2018-08-30 RX ORDER — AZITHROMYCIN 500 MG/1
500 TABLET, FILM COATED ORAL ONCE
Qty: 0 | Refills: 0 | Status: COMPLETED | OUTPATIENT
Start: 2018-08-30 | End: 2018-08-30

## 2018-08-30 RX ORDER — INSULIN LISPRO 100/ML
VIAL (ML) SUBCUTANEOUS
Qty: 0 | Refills: 0 | Status: DISCONTINUED | OUTPATIENT
Start: 2018-08-30 | End: 2018-09-08

## 2018-08-30 RX ORDER — COLCHICINE 0.6 MG
0.6 TABLET ORAL DAILY
Qty: 0 | Refills: 0 | Status: DISCONTINUED | OUTPATIENT
Start: 2018-08-30 | End: 2018-08-31

## 2018-08-30 RX ORDER — INSULIN LISPRO 100/ML
VIAL (ML) SUBCUTANEOUS AT BEDTIME
Qty: 0 | Refills: 0 | Status: DISCONTINUED | OUTPATIENT
Start: 2018-08-30 | End: 2018-09-08

## 2018-08-30 RX ORDER — DEXTROSE 50 % IN WATER 50 %
12.5 SYRINGE (ML) INTRAVENOUS ONCE
Qty: 0 | Refills: 0 | Status: DISCONTINUED | OUTPATIENT
Start: 2018-08-30 | End: 2018-09-08

## 2018-08-30 RX ORDER — FUROSEMIDE 40 MG
40 TABLET ORAL ONCE
Qty: 0 | Refills: 0 | Status: COMPLETED | OUTPATIENT
Start: 2018-08-30 | End: 2018-08-30

## 2018-08-30 RX ORDER — MORPHINE SULFATE 50 MG/1
2 CAPSULE, EXTENDED RELEASE ORAL EVERY 4 HOURS
Qty: 0 | Refills: 0 | Status: DISCONTINUED | OUTPATIENT
Start: 2018-08-30 | End: 2018-08-30

## 2018-08-30 RX ORDER — DOCUSATE SODIUM 100 MG
100 CAPSULE ORAL
Qty: 0 | Refills: 0 | Status: DISCONTINUED | OUTPATIENT
Start: 2018-08-30 | End: 2018-09-08

## 2018-08-30 RX ORDER — FERROUS SULFATE 325(65) MG
325 TABLET ORAL DAILY
Qty: 0 | Refills: 0 | Status: DISCONTINUED | OUTPATIENT
Start: 2018-08-30 | End: 2018-09-08

## 2018-08-30 RX ORDER — AZITHROMYCIN 500 MG/1
500 TABLET, FILM COATED ORAL EVERY 24 HOURS
Qty: 0 | Refills: 0 | Status: DISCONTINUED | OUTPATIENT
Start: 2018-08-31 | End: 2018-09-01

## 2018-08-30 RX ORDER — CHLORHEXIDINE GLUCONATE 213 G/1000ML
1 SOLUTION TOPICAL
Qty: 0 | Refills: 0 | Status: DISCONTINUED | OUTPATIENT
Start: 2018-08-30 | End: 2018-09-08

## 2018-08-30 RX ORDER — CEFTRIAXONE 500 MG/1
1 INJECTION, POWDER, FOR SOLUTION INTRAMUSCULAR; INTRAVENOUS EVERY 24 HOURS
Qty: 0 | Refills: 0 | Status: DISCONTINUED | OUTPATIENT
Start: 2018-08-31 | End: 2018-09-01

## 2018-08-30 RX ORDER — CARVEDILOL PHOSPHATE 80 MG/1
12.5 CAPSULE, EXTENDED RELEASE ORAL EVERY 12 HOURS
Qty: 0 | Refills: 0 | Status: DISCONTINUED | OUTPATIENT
Start: 2018-08-30 | End: 2018-09-08

## 2018-08-30 RX ORDER — OXYCODONE AND ACETAMINOPHEN 5; 325 MG/1; MG/1
2 TABLET ORAL EVERY 4 HOURS
Qty: 0 | Refills: 0 | Status: DISCONTINUED | OUTPATIENT
Start: 2018-08-30 | End: 2018-08-30

## 2018-08-30 RX ORDER — AZITHROMYCIN 500 MG/1
TABLET, FILM COATED ORAL
Qty: 0 | Refills: 0 | Status: DISCONTINUED | OUTPATIENT
Start: 2018-08-30 | End: 2018-09-01

## 2018-08-30 RX ORDER — DEXTROSE 50 % IN WATER 50 %
15 SYRINGE (ML) INTRAVENOUS ONCE
Qty: 0 | Refills: 0 | Status: DISCONTINUED | OUTPATIENT
Start: 2018-08-30 | End: 2018-09-08

## 2018-08-30 RX ADMIN — OXYCODONE AND ACETAMINOPHEN 2 TABLET(S): 5; 325 TABLET ORAL at 22:21

## 2018-08-30 RX ADMIN — Medication 3 MILLILITER(S): at 00:41

## 2018-08-30 RX ADMIN — HEPARIN SODIUM 5000 UNIT(S): 5000 INJECTION INTRAVENOUS; SUBCUTANEOUS at 19:29

## 2018-08-30 RX ADMIN — Medication 81 MILLIGRAM(S): at 22:16

## 2018-08-30 RX ADMIN — MORPHINE SULFATE 2 MILLIGRAM(S): 50 CAPSULE, EXTENDED RELEASE ORAL at 16:15

## 2018-08-30 RX ADMIN — CARVEDILOL PHOSPHATE 12.5 MILLIGRAM(S): 80 CAPSULE, EXTENDED RELEASE ORAL at 19:29

## 2018-08-30 RX ADMIN — Medication 40 MILLIGRAM(S): at 06:50

## 2018-08-30 RX ADMIN — AZITHROMYCIN 255 MILLIGRAM(S): 500 TABLET, FILM COATED ORAL at 16:18

## 2018-08-30 RX ADMIN — Medication 100 MILLIGRAM(S): at 19:29

## 2018-08-30 RX ADMIN — MORPHINE SULFATE 2 MILLIGRAM(S): 50 CAPSULE, EXTENDED RELEASE ORAL at 16:01

## 2018-08-30 RX ADMIN — Medication 5 MILLILITER(S): at 01:57

## 2018-08-30 RX ADMIN — Medication 650 MILLIGRAM(S): at 15:55

## 2018-08-30 RX ADMIN — Medication 325 MILLIGRAM(S): at 22:16

## 2018-08-30 RX ADMIN — Medication 0.6 MILLIGRAM(S): at 22:36

## 2018-08-30 RX ADMIN — CEFTRIAXONE 100 GRAM(S): 500 INJECTION, POWDER, FOR SOLUTION INTRAMUSCULAR; INTRAVENOUS at 18:14

## 2018-08-30 NOTE — ED ADULT NURSE NOTE - OBJECTIVE STATEMENT
Patient presents to ER complaining of difficulty breathing, onset of symptoms while patient was at her HD session tonight, patient has been on HD for 6 months, reports productive cough with blood, resp even/labored, reports subjective fevers.

## 2018-08-30 NOTE — H&P ADULT - ASSESSMENT
58 year old female esrd, large pleural effusion with right side chest tube in place, acute on chronic diastolic heart failure, dm, a fib not on anticoagulation secondary to gi bleed, Rami fracture with gait abnormality

## 2018-08-30 NOTE — ED ADULT NURSE NOTE - NSIMPLEMENTINTERV_GEN_ALL_ED
Implemented All Universal Safety Interventions:  [place] to call system. Call bell, personal items and telephone within reach. Instruct patient to call for assistance. Room bathroom lighting operational. Non-slip footwear when patient is off stretcher. Physically safe environment: no spills, clutter or unnecessary equipment. Stretcher in lowest position, wheels locked, appropriate side rails in place.

## 2018-08-30 NOTE — H&P ADULT - PROBLEM SELECTOR PLAN 1
Chest in place draining. I spoke with Dr Null who plans bronchoscopy or VAtS after chest tube can be removed. Antibiotics were added, await culture results.

## 2018-08-30 NOTE — PATIENT PROFILE ADULT. - FUNCTIONAL SCREEN CURRENT LEVEL: EATING, MLM
Electrodesiccation Text: The wound bed was treated with electrodesiccation after the biopsy was performed. (0) independent

## 2018-08-30 NOTE — CONSULT NOTE ADULT - ASSESSMENT
58F, h/o pleural effusion s/p thoracentesis on R 3/2018, now p/w SOB, hemoptysis since monday, found to have large pleural effusion;

## 2018-08-30 NOTE — CONSULT NOTE ADULT - ASSESSMENT
A/P: 57 y/o F PMHx of NICM, HFrEF (EF 40-45%), Afib not on AC due to GI bleed, pleural effusion, pericardial effusion, ESRD MWF HD, anemia, and pubic rami fracture transferred from HD facility due to SOB. Found to have worsening right pleural effusion s/p Pigtail catheter. New nonspecific EKG changes. BNP >42485.    1. NICM HFrEF (EF 40-45%)  - Diffuse fluid overload  - Nephro consult for fluid removal  - Trend cardiac enzymes due to nonspecific EKG changes.   - Cont aspirin and coreg.   - Obtain TTE     2. Paroxysmal Afib  - Currently in NSR  - Unable to tolerate coumadin due to GI bleed.   - Was being evaluated for Watchmen procedure, pt didn't follow up.  - Cont telemetry monitoring.     3. Pericardial effusion  - Improved on TTE 05/2018 s/p ibuprofen and colcrys  - None seen on CT chest.  - Fully evaluate with repeat TTE.     4. Pleural Effusion and Airspace Consolidation  - Continue chest tube management by CT surgery.   - Pulm evaluation for bronchoscopy  - ABX coverage.       Pleural effusion   presents with Patient is a 58y old  Female who presents with a chief complaint of Shortness of breath (30 Aug 2018 10:33)    1) A/P: 59 y/o F PMHx of NICM, HFrEF (EF 40-45%), Afib not on AC due to GI bleed, pleural effusion, pericardial effusion, ESRD MWF HD, anemia, and pubic rami fracture transferred from HD facility due to SOB. Found to have worsening right pleural effusion s/p Pigtail catheter. New nonspecific EKG changes. BNP >63244.    1. NICM HFrEF (EF 40-45%)  - Diffuse fluid overload  - Nephro consult for fluid removal  - Trend cardiac enzymes due to nonspecific EKG changes.   - Cont aspirin and coreg.   - Obtain TTE     2. Paroxysmal Afib  - Currently in NSR  - Unable to tolerate coumadin due to GI bleed.   - Was being evaluated for Watchmen procedure, pt didn't follow up.  - Cont telemetry monitoring.     3. Pericardial effusion  - Improved on TTE 05/2018 s/p ibuprofen and colcrys  - None seen on CT chest.  - Fully evaluate with repeat TTE.     4. Pleural Effusion and Airspace Consolidation  - Continue chest tube management by CT surgery.   - Pulm evaluation for bronchoscopy  - ABX coverage.

## 2018-08-30 NOTE — ED ADULT NURSE REASSESSMENT NOTE - NS ED NURSE REASSESS COMMENT FT1
hematology called released pleural fluid results but pathology will reevaluate it in the am
pt pig tail draining yellow color fluid no respiratory distress vss will continue to monitor
pt rec'd at 1900, A+Ox4, returned from dialysis.  Medicated with meds from 1800.  RCW permacath site visualized, dressing dry and intact.  CT site visualized, dressing dry and intact, 190cc of serosanguinous drainage.  Pt offering no complaints at this time.  Report given to Diana VENEGAS RN at 2017, pt was transferred on monitor, was told that patient should not have been transferred without cancelling chlorhexidine order, however was unable to reach PA before transfer to .

## 2018-08-30 NOTE — H&P ADULT - HISTORY OF PRESENT ILLNESS
58 year old female esrd on hd, history of pericardial effusion, dm, a fib not on anticoagulation secondary to gi bleed, 58 year old female esrd on hd, history of pericardial effusion, dm, a fib not on anticoagulation secondary to gi bleed, anemia, Pubic rami fracture, anemia. Patient was in her usual state of poor health when she became severely short of breath in hemo-dialysis. She was sent to the er where she was found to have a large pleural effusion and a chest tube was placed and the fluid was sent for blood culture. The chest drained originally, then was clamped, and on 8/30 unclamped and continues to drain. She is currently lying in bed, appears ill. Ct surgery input appreciated, cardiology consulted, add antibiotics and assess blood cultures.

## 2018-08-31 LAB
ALBUMIN FLD-MCNC: 1.5 G/DL — SIGNIFICANT CHANGE UP
ANION GAP SERPL CALC-SCNC: 12 MMOL/L — SIGNIFICANT CHANGE UP (ref 5–17)
ANISOCYTOSIS BLD QL: SLIGHT — SIGNIFICANT CHANGE UP
BUN SERPL-MCNC: 21 MG/DL — HIGH (ref 8–20)
CALCIUM SERPL-MCNC: 8.5 MG/DL — LOW (ref 8.6–10.2)
CHLORIDE SERPL-SCNC: 95 MMOL/L — LOW (ref 98–107)
CO2 SERPL-SCNC: 29 MMOL/L — SIGNIFICANT CHANGE UP (ref 22–29)
CORTIS AM PEAK SERPL-MCNC: 47.1 UG/DL — HIGH (ref 6–18.4)
CREAT SERPL-MCNC: 2.89 MG/DL — HIGH (ref 0.5–1.3)
EOSINOPHIL # BLD AUTO: 0 K/UL — SIGNIFICANT CHANGE UP (ref 0–0.5)
EOSINOPHIL NFR BLD AUTO: 1 % — SIGNIFICANT CHANGE UP (ref 0–5)
GLUCOSE BLDC GLUCOMTR-MCNC: 146 MG/DL — HIGH (ref 70–99)
GLUCOSE BLDC GLUCOMTR-MCNC: 149 MG/DL — HIGH (ref 70–99)
GLUCOSE BLDC GLUCOMTR-MCNC: 162 MG/DL — HIGH (ref 70–99)
GLUCOSE BLDC GLUCOMTR-MCNC: 210 MG/DL — HIGH (ref 70–99)
GLUCOSE FLD-MCNC: 172 MG/DL — SIGNIFICANT CHANGE UP
GLUCOSE SERPL-MCNC: 174 MG/DL — HIGH (ref 70–115)
HBA1C BLD-MCNC: 7 % — HIGH (ref 4–5.6)
HCT VFR BLD CALC: 34.8 % — LOW (ref 37–47)
HGB BLD-MCNC: 10.3 G/DL — LOW (ref 12–16)
LDH SERPL L TO P-CCNC: 103 U/L — SIGNIFICANT CHANGE UP
LYMPHOCYTES # BLD AUTO: 0.5 K/UL — LOW (ref 1–4.8)
LYMPHOCYTES # BLD AUTO: 15 % — LOW (ref 20–55)
MACROCYTES BLD QL: SLIGHT — SIGNIFICANT CHANGE UP
MAGNESIUM SERPL-MCNC: 2.1 MG/DL — SIGNIFICANT CHANGE UP (ref 1.6–2.6)
MCHC RBC-ENTMCNC: 28.4 PG — SIGNIFICANT CHANGE UP (ref 27–31)
MCHC RBC-ENTMCNC: 29.6 G/DL — LOW (ref 32–36)
MCV RBC AUTO: 95.9 FL — SIGNIFICANT CHANGE UP (ref 81–99)
MICROCYTES BLD QL: SLIGHT — SIGNIFICANT CHANGE UP
MONOCYTES # BLD AUTO: 0.2 K/UL — SIGNIFICANT CHANGE UP (ref 0–0.8)
MONOCYTES NFR BLD AUTO: 5 % — SIGNIFICANT CHANGE UP (ref 3–10)
NEUTROPHILS # BLD AUTO: 2.5 K/UL — SIGNIFICANT CHANGE UP (ref 1.8–8)
NEUTROPHILS NFR BLD AUTO: 79 % — HIGH (ref 37–73)
NIGHT BLUE STAIN TISS: SIGNIFICANT CHANGE UP
PHOSPHATE SERPL-MCNC: 4.1 MG/DL — SIGNIFICANT CHANGE UP (ref 2.4–4.7)
PLAT MORPH BLD: NORMAL — SIGNIFICANT CHANGE UP
PLATELET # BLD AUTO: 190 K/UL — SIGNIFICANT CHANGE UP (ref 150–400)
POIKILOCYTOSIS BLD QL AUTO: SLIGHT — SIGNIFICANT CHANGE UP
POTASSIUM SERPL-MCNC: 4.9 MMOL/L — SIGNIFICANT CHANGE UP (ref 3.5–5.3)
POTASSIUM SERPL-SCNC: 4.9 MMOL/L — SIGNIFICANT CHANGE UP (ref 3.5–5.3)
PROT FLD-MCNC: 3 G/DL — SIGNIFICANT CHANGE UP
RBC # BLD: 3.63 M/UL — LOW (ref 4.4–5.2)
RBC # FLD: 14.7 % — SIGNIFICANT CHANGE UP (ref 11–15.6)
RBC BLD AUTO: ABNORMAL
SODIUM SERPL-SCNC: 136 MMOL/L — SIGNIFICANT CHANGE UP (ref 135–145)
SPECIMEN SOURCE: SIGNIFICANT CHANGE UP
T4 FREE SERPL-MCNC: 1.3 NG/DL — SIGNIFICANT CHANGE UP (ref 0.9–1.8)
TSH SERPL-MCNC: 6.49 UIU/ML — HIGH (ref 0.27–4.2)
WBC # BLD: 3.2 K/UL — LOW (ref 4.8–10.8)
WBC # FLD AUTO: 3.2 K/UL — LOW (ref 4.8–10.8)

## 2018-08-31 PROCEDURE — 71045 X-RAY EXAM CHEST 1 VIEW: CPT | Mod: 26

## 2018-08-31 PROCEDURE — 99232 SBSQ HOSP IP/OBS MODERATE 35: CPT

## 2018-08-31 PROCEDURE — 99233 SBSQ HOSP IP/OBS HIGH 50: CPT

## 2018-08-31 RX ORDER — SACUBITRIL AND VALSARTAN 24; 26 MG/1; MG/1
1 TABLET, FILM COATED ORAL
Qty: 0 | Refills: 0 | Status: DISCONTINUED | OUTPATIENT
Start: 2018-08-31 | End: 2018-09-05

## 2018-08-31 RX ADMIN — HEPARIN SODIUM 5000 UNIT(S): 5000 INJECTION INTRAVENOUS; SUBCUTANEOUS at 06:10

## 2018-08-31 RX ADMIN — Medication 325 MILLIGRAM(S): at 14:50

## 2018-08-31 RX ADMIN — HEPARIN SODIUM 5000 UNIT(S): 5000 INJECTION INTRAVENOUS; SUBCUTANEOUS at 17:27

## 2018-08-31 RX ADMIN — CARVEDILOL PHOSPHATE 12.5 MILLIGRAM(S): 80 CAPSULE, EXTENDED RELEASE ORAL at 17:23

## 2018-08-31 RX ADMIN — SACUBITRIL AND VALSARTAN 1 TABLET(S): 24; 26 TABLET, FILM COATED ORAL at 18:00

## 2018-08-31 RX ADMIN — CEFTRIAXONE 100 GRAM(S): 500 INJECTION, POWDER, FOR SOLUTION INTRAMUSCULAR; INTRAVENOUS at 14:49

## 2018-08-31 RX ADMIN — Medication 100 MILLIGRAM(S): at 17:23

## 2018-08-31 RX ADMIN — Medication 100 MILLIGRAM(S): at 06:09

## 2018-08-31 RX ADMIN — AZITHROMYCIN 255 MILLIGRAM(S): 500 TABLET, FILM COATED ORAL at 12:39

## 2018-08-31 RX ADMIN — Medication 2: at 08:17

## 2018-08-31 RX ADMIN — Medication 81 MILLIGRAM(S): at 14:49

## 2018-08-31 RX ADMIN — OXYCODONE AND ACETAMINOPHEN 2 TABLET(S): 5; 325 TABLET ORAL at 02:25

## 2018-08-31 RX ADMIN — Medication 0.6 MILLIGRAM(S): at 14:50

## 2018-08-31 RX ADMIN — Medication 4: at 17:26

## 2018-08-31 RX ADMIN — CARVEDILOL PHOSPHATE 12.5 MILLIGRAM(S): 80 CAPSULE, EXTENDED RELEASE ORAL at 06:08

## 2018-08-31 RX ADMIN — CHLORHEXIDINE GLUCONATE 1 APPLICATION(S): 213 SOLUTION TOPICAL at 06:07

## 2018-08-31 NOTE — PROVIDER CONTACT NOTE (CRITICAL VALUE NOTIFICATION) - ASSESSMENT
Pt Normal Sinus on heart monitor saturating 96% on 3 L NC. Denies Chest pain. No acute signs of distress. No complaints

## 2018-08-31 NOTE — PROGRESS NOTE ADULT - ASSESSMENT
58F, h/o pleural effusion s/p thoracentesis on R 3/2018, now p/w SOB, hemoptysis since monday, found to have large pleural effusion.  Right pigtail catheter placed yesterday.  Hemoptysis now resolved per pt.

## 2018-08-31 NOTE — PROGRESS NOTE ADULT - SUBJECTIVE AND OBJECTIVE BOX
CC: Dyspnea    INTERVAL HPI/OVERNIGHT EVENTS: Patient seen and examined, sitting in a chair comfortably. Chest tube in place. Denies fever, chills or sweats. Denies cough, sob or chest pain. Denies abdominal pain, urinary or bowel complaints.       Vital Signs Last 24 Hrs  T(C): 37.2 (31 Aug 2018 04:55), Max: 39.3 (30 Aug 2018 15:53)  T(F): 98.9 (31 Aug 2018 04:55), Max: 102.8 (30 Aug 2018 15:53)  HR: 71 (31 Aug 2018 04:55) (71 - 105)  BP: 127/75 (31 Aug 2018 11:50) (117/68 - 157/90)  BP(mean): --  RR: 18 (31 Aug 2018 04:55) (18 - 20)  SpO2: 96% (31 Aug 2018 08:02) (85% - 100%)    PHYSICAL EXAM:    GENERAL: NAD, AOX3  HEAD:  Atraumatic, Normocephalic  EYES: EOMI, PERRLA, conjunctiva and sclera clear  ENMT: Moist mucous membranes  NECK: Supple, No JVD  CHEST/LUNG: RIght chest tube in place; decreased breath sounds right base  HEART: Regular rate and rhythm; No murmurs, rubs, or gallops  ABDOMEN: Soft, Nontender, Nondistended; Bowel sounds present  EXTREMITIES:  2+ Peripheral Pulses, No clubbing, cyanosis, or edema        MEDICATIONS  (STANDING):  aspirin enteric coated 81 milliGRAM(s) Oral daily  azithromycin  IVPB      azithromycin  IVPB 500 milliGRAM(s) IV Intermittent every 24 hours  carvedilol 12.5 milliGRAM(s) Oral every 12 hours  cefTRIAXone   IVPB 1 Gram(s) IV Intermittent every 24 hours  cefTRIAXone   IVPB      chlorhexidine 2% Cloths 1 Application(s) Topical <User Schedule>  colchicine 0.6 milliGRAM(s) Oral daily  dextrose 5%. 1000 milliLiter(s) (50 mL/Hr) IV Continuous <Continuous>  dextrose 50% Injectable 12.5 Gram(s) IV Push once  dextrose 50% Injectable 25 Gram(s) IV Push once  dextrose 50% Injectable 25 Gram(s) IV Push once  docusate sodium 100 milliGRAM(s) Oral two times a day  ferrous    sulfate 325 milliGRAM(s) Oral daily  heparin  Injectable 5000 Unit(s) SubCutaneous every 12 hours  insulin lispro (HumaLOG) corrective regimen sliding scale   SubCutaneous three times a day before meals  insulin lispro (HumaLOG) corrective regimen sliding scale   SubCutaneous at bedtime    MEDICATIONS  (PRN):  acetaminophen   Tablet 650 milliGRAM(s) Oral every 6 hours PRN For Temp greater than 38 C (100.4 F)  acetaminophen   Tablet. 650 milliGRAM(s) Oral every 6 hours PRN Mild Pain (1 - 3)  dextrose 40% Gel 15 Gram(s) Oral once PRN Blood Glucose LESS THAN 70 milliGRAM(s)/deciliter  glucagon  Injectable 1 milliGRAM(s) IntraMuscular once PRN Glucose LESS THAN 70 milligrams/deciliter  morphine  - Injectable 2 milliGRAM(s) IV Push every 4 hours PRN Severe Pain (7 - 10)  oxyCODONE    5 mG/acetaminophen 325 mG 2 Tablet(s) Oral every 4 hours PRN Moderate Pain (4 - 6)  senna 2 Tablet(s) Oral at bedtime PRN Constipation      Allergies    No Known Allergies    Intolerances          LABS:                          10.3   3.2   )-----------( 190      ( 31 Aug 2018 07:56 )             34.8     08-31    136  |  95<L>  |  21.0<H>  ----------------------------<  174<H>  4.9   |  29.0  |  2.89<H>    Ca    8.5<L>      31 Aug 2018 07:56  Phos  4.1     08-31  Mg     2.1     08-31    TPro  7.1  /  Alb  3.4  /  TBili  0.6  /  DBili  x   /  AST  22  /  ALT  9   /  AlkPhos  110  08-30    PT/INR - ( 30 Aug 2018 00:34 )   PT: 13.5 sec;   INR: 1.22 ratio         PTT - ( 30 Aug 2018 00:34 )  PTT:29.2 sec      RADIOLOGY & ADDITIONAL TESTS:

## 2018-08-31 NOTE — PROGRESS NOTE ADULT - SUBJECTIVE AND OBJECTIVE BOX
Subjective:  Pt sitting up in bed.  Interviewed and examined using [facility]  phone service ( # [phone]).  Denies SOB or CP.  NAD noted.    Vital Signs:  Vital Signs Last 24 Hrs  T(C): 37.2 (08-31-18 @ 04:55), Max: 39.3 (08-30-18 @ 15:53)  T(F): 98.9 (08-31-18 @ 04:55), Max: 102.8 (08-30-18 @ 15:53)  HR: 71 (08-31-18 @ 04:55) (71 - 105)  BP: 117/68 (08-31-18 @ 04:55) (117/68 - 157/90)  RR: 18 (08-31-18 @ 04:55) (18 - 20)  SpO2: 96% (08-31-18 @ 08:02) (85% - 100%) on (O2)    Relevant labs, radiology and Medications reviewed    Neurology: A&Ox3, nonfocal.  Respiratory: Diminished BLL.  CV: RRR, S1S2  Abdominal: Soft, NT, ND +BS.  Extremities: No edema, + peripheral pulses  Right CT site with dsd c/d/i.  No crepitus.  No air leak.      08-30 @ 07:01  -  08-31 @ 07:00  --------------------------------------------------------  IN:  Total IN: 0 mL    OUT:    Chest Tube: 2000 mL    Other: 1600 mL  Total OUT: 3600 mL    Total NET: -3600 mL      08-31 @ 07:01  -  08-31 @ 10:47  --------------------------------------------------------  IN:    Oral Fluid: 360 mL  Total IN: 360 mL    OUT:  Total OUT: 0 mL    Total NET: 360 mL

## 2018-08-31 NOTE — PROGRESS NOTE ADULT - ASSESSMENT
58 year old female esrd, large pleural effusion with right side chest tube in place, acute on chronic diastolic heart failure, dm, a fib not on anticoagulation secondary to gi bleed, Rami fracture with gait abnormality The patient is a 58 year old female with a history of ESRD on HD MWF, afib not on anticoagulation given history of GIB, chronic diastolic CHF who presented to the ER with complaints of worsening shortness of breath. CT on admission consistent with large right pleural effusion now status post pigtail placement by CTS.     Assessment/Plan:    1. Right pleural effusion status post pigtail placement: CT surgery following. On IV antibiotics. Prelim fluid cultures pending.     2. ESRD on HD MWF: Renal following    3. Acute on chronic systolic CHF with NICM: Follow up echocardiogram; cardiology following    4. AFib: Rate controlled; Unable to tolerate coumadin due to GI bleed. Was being evaluated for Watchmen procedure, however patient missed appointment in office.      5. Pericardial effusion - hx of uremic pericarditis, currently stable.  Await re-eval with TTE.  On colchicine.     6. Diabetes mellitus type 2: HSS> MOnitor bsl.     VTE- Heparin Subcut

## 2018-08-31 NOTE — PROGRESS NOTE ADULT - ASSESSMENT
57 y/o F PMHx of NICM, HFrEF (EF 40-45%), Afib not on AC due to GI bleed, pleural effusion, pericardial effusion, ESRD MWF HD, anemia, and pubic rami fracture transferred from HD facility due to SOB. Found to have worsening right pleural effusion s/p Pigtail catheter. New nonspecific EKG changes. BNP >84441.    1. NICM HFrEF (EF 40-45%) - decompensated, improved with HD.  Agree with repeat TTE. BP control.  2. Paroxysmal Afib- currently in SR, rates controlled.  Unable to tolerate coumadin due to GI bleed. Was being evaluated for Watchmen procedure, however patient missed appointment in office.  Will continue plan for eventual Watchman.  No AC.   3. Pericardial effusion - hx of uremic pericarditis, currently stable.  Await re-eval with TTE.  On colchicine.   4. Pleural Effusion bilateral, R> L - s/p thoracentesis of right, PleurX catheter on right.    5. hypertension - well controlled on current regimen    Will follow.   Thank you for allowing me to participate in your patient's care.

## 2018-08-31 NOTE — PHYSICAL THERAPY INITIAL EVALUATION ADULT - CRITERIA FOR SKILLED THERAPEUTIC INTERVENTIONS
risk reduction/prevention/rehab potential/therapy frequency/impairments found/anticipated equipment needs at discharge/anticipated discharge recommendation/functional limitations in following categories

## 2018-08-31 NOTE — PHYSICAL THERAPY INITIAL EVALUATION ADULT - ADDITIONAL COMMENTS
pt states she lives in a rented room in a house where other family rents rooms, as well. she states there are 8 steps to enter (+rail). uses a RW for amb. states  assists with amb and stairs.

## 2018-08-31 NOTE — PROGRESS NOTE ADULT - SUBJECTIVE AND OBJECTIVE BOX
Patient seen and examined    I&O's Summary    30 Aug 2018 07:01  -  31 Aug 2018 07:00  --------------------------------------------------------  IN: 0 mL / OUT: 3650 mL / NET: -3650 mL    31 Aug 2018 07:01  -  31 Aug 2018 20:46  --------------------------------------------------------  IN: 720 mL / OUT: 350 mL / NET: 370 mL    s/p R pigtail; SOB/hemoptysis less    REVIEW OF SYSTEMS:    CONSTITUTIONAL: No F/C  RESPIRATORY: No cough,  No SOB at rest  CARDIOVASCULAR: No CP/palpitations,    GASTROINTESTINAL: No abdominal pain  or NVD   GENITOURINARY: No UTI sx  NEUROLOGICAL: No headaches, NO wk/numbness  MUSCULOSKELETAL:   EXTREMITIES : no swelling,    Vital Signs Last 24 Hrs  T(C): 36.8 (31 Aug 2018 17:34), Max: 37.2 (31 Aug 2018 04:55)  T(F): 98.2 (31 Aug 2018 17:34), Max: 98.9 (31 Aug 2018 04:55)  HR: 82 (31 Aug 2018 17:34) (70 - 82)  BP: 116/71 (31 Aug 2018 17:34) (112/62 - 127/75)  BP(mean): --  RR: 20 (31 Aug 2018 17:34) (18 - 20)  SpO2: 100% (31 Aug 2018 17:29) (96% - 100%)    PHYSICAL EXAM:    GENERAL: NAD,   EYES:  conjunctiva and sclera clear  NECK: Supple, No JVD/Bruit  NERVOUS SYSTEM:  A/O x3,   CHEST:  No rales or rhonchi; poor BS bases; R CT noted  HEART:  RRR, No murmur  ABDOMEN: Soft, NT/ND BS+  EXTREMITIES:  No Edema; L Ua AVF good thrill and bruit  SKIN: No rashes    LABS:                          10.3   3.2   )-----------( 190      ( 31 Aug 2018 07:56 )             34.8     08-31    136  |  95<L>  |  21.0<H>  ----------------------------<  174<H>  4.9   |  29.0  |  2.89<H>    Ca    8.5<L>      31 Aug 2018 07:56  Phos  4.1     08-31  Mg     2.1     08-31    TPro  7.1  /  Alb  3.4  /  TBili  0.6  /  DBili  x   /  AST  22  /  ALT  9   /  AlkPhos  110  08-30      MEDICATIONS  (STANDING):  acetaminophen   Tablet PRN  acetaminophen   Tablet. PRN  aspirin enteric coated  azithromycin  IVPB  azithromycin  IVPB  carvedilol  cefTRIAXone   IVPB  cefTRIAXone   IVPB  chlorhexidine 2% Cloths  dextrose 40% Gel PRN  dextrose 5%.  dextrose 50% Injectable  dextrose 50% Injectable  dextrose 50% Injectable  docusate sodium  ferrous    sulfate  glucagon  Injectable PRN  heparin  Injectable  insulin lispro (HumaLOG) corrective regimen sliding scale  insulin lispro (HumaLOG) corrective regimen sliding scale  morphine  - Injectable PRN  oxyCODONE    5 mG/acetaminophen 325 mG PRN  sacubitril 24 mG/valsartan 26 mG  senna PRN

## 2018-08-31 NOTE — PROGRESS NOTE ADULT - ASSESSMENT
59 y/o F PMHx of NICM, HFrEF (EF 40-45%), Afib not on AC due to GI bleed, pleural effusion, pericardial effusion, ESRD MWF HD, anemia, and pubic rami fracture transferred from HD facility due to SOB. Found to have worsening right pleural effusion s/p Pigtail catheter. New nonspecific EKG changes. BNP >32673.    1. NICM HFrEF (EF 40-45%) - decompensated, improved with HD.  Agree with repeat TTE. BP control.  2. Paroxysmal Afib- currently in SR, rates controlled.  Unable to tolerate coumadin due to GI bleed. Was being evaluated for Watchmen procedure, however patient missed appointment in office.  Will continue plan for eventual Watchman.  No AC.   3. Pericardial effusion - hx of uremic pericarditis, currently stable.  Await re-eval with TTE.  On colchicine.   4. Pleural Effusion bilateral, R> L - s/p thoracentesis of right, PleurX catheter on right.    5. hypertension - well controlled on current regimen  6. ESRD - HD am

## 2018-08-31 NOTE — CONSULT NOTE ADULT - ASSESSMENT
A/P: 59 yo F with ESRD on HD via R permacath; Lost to followup and now admitted for R pleural effusion, pre-existing LUE AV graft appears healthy    -Left arm AV graft OK to use per Dr. Segundo  -Please let vascular team know if there are any issues during dialysis.  -Provider to RN order placed by our team to show approval to access LUE AV Graft  -Will talk to hemodialysis nursing team personally as well.  -If next few HD sessions go well through the graft, our team will remove the permacath next week.    Patient seen by me and discussed with Dr. Segundo  Thank you for allowing us to participate in your patient's care    Juanjo Motta MD PGY4  Surgery Resident

## 2018-08-31 NOTE — PROGRESS NOTE ADULT - SUBJECTIVE AND OBJECTIVE BOX
[facility] Faculty Practice                                                        Office: [address] [address]                                                       Telephone: [phone]. Fax:[phone]      CARDIOLOGY PROGRESS NOTE   ([facility])    Subjective: Patient seen and examined.  Reports breathing much improved, but not back to baseline.  Underwent HD and right sided thoracentesis s/p PleurX catheter placement yesterday.    ROS: No headache, no chest pain, no SOB, no palpitations, no dizziness, no nausea, no bleeding    Chronic Conditions:  ESRD - tolerating HD, getting HD via permcath.  LUE AVF - ?close to maturation  HFPEF - improved  paroxysmal atrial fibrillation - rates controlled, not on AC due to major UGIB  hypertension - well controlled    CURRENT MEDICATIONS  carvedilol 12.5 milliGRAM(s) Oral every 12 hours  azithromycin  IVPB      azithromycin  IVPB 500 milliGRAM(s) IV Intermittent every 24 hours  cefTRIAXone   IVPB 1 Gram(s) IV Intermittent every 24 hours  cefTRIAXone   IVPB      acetaminophen   Tablet 650 milliGRAM(s) Oral every 6 hours PRN  acetaminophen   Tablet. 650 milliGRAM(s) Oral every 6 hours PRN  morphine  - Injectable 2 milliGRAM(s) IV Push every 4 hours PRN  oxyCODONE    5 mG/acetaminophen 325 mG 2 Tablet(s) Oral every 4 hours PRN  docusate sodium 100 milliGRAM(s) Oral two times a day  senna 2 Tablet(s) Oral at bedtime PRN  colchicine 0.6 milliGRAM(s) Oral daily  dextrose 40% Gel 15 Gram(s) Oral once PRN  dextrose 50% Injectable 12.5 Gram(s) IV Push once  dextrose 50% Injectable 25 Gram(s) IV Push once  dextrose 50% Injectable 25 Gram(s) IV Push once  glucagon  Injectable 1 milliGRAM(s) IntraMuscular once PRN  insulin lispro (HumaLOG) corrective regimen sliding scale   SubCutaneous three times a day before meals  insulin lispro (HumaLOG) corrective regimen sliding scale   SubCutaneous at bedtime  aspirin enteric coated 81 milliGRAM(s) Oral daily  chlorhexidine 2% Cloths 1 Application(s) Topical <User Schedule>  dextrose 5%. 1000 milliLiter(s) IV Continuous <Continuous>  ferrous    sulfate 325 milliGRAM(s) Oral daily  heparin  Injectable 5000 Unit(s) SubCutaneous every 12 hours  	  TELEMETRY:   Vitals:  T(C): 37.2 (08-31-18 @ 04:55), Max: 39.3 (08-30-18 @ 15:53)  HR: 71 (08-31-18 @ 04:55) (71 - 105)  BP: 117/68 (08-31-18 @ 04:55) (117/68 - 157/90)  RR: 18 (08-31-18 @ 04:55) (18 - 20)  SpO2: 96% (08-31-18 @ 08:02) (85% - 100%)  Wt(kg): --  I&O's Summary    30 Aug 2018 07:01  -  31 Aug 2018 07:00  --------------------------------------------------------  IN: 0 mL / OUT: 3600 mL / NET: -3600 mL    31 Aug 2018 07:01  -  31 Aug 2018 10:18  --------------------------------------------------------  IN: 360 mL / OUT: 0 mL / NET: 360 mL    PHYSICAL EXAM:  Appearance: NAD	  HEENT:   Normal oral mucosa, PERRL, EOMI	  Lymphatic: No lymphadenopathy  Cardiovascular: Normal S1 S2, No JVD, III/VI systolic murmur, No edema  Respiratory: left base decreased breath sounds  Psychiatry: A & O x 3, Mood & affect appropriate  Gastrointestinal:  Soft, Non-tender, + BS	  Skin: No rashes, No ecchymoses, No cyanosis  Neurologic: Non-focal  Extremities: Normal range of motion, No clubbing, cyanosis or edema  Vascular: Peripheral pulses palpable 2+ bilaterally, warm; R perm cath, LUE AVF with palpable thrill      DIAGNOSTIC TESTING:  Echocardiogram repeat pending    < from: Cardiac Cath Lab - Adult (03.23.18 @ 13:53) >  VENTRICLES: There were no left ventricular global or regional wall motion  abnormalities. Global left ventricular function was normal. EF calculated  by contrast ventriculography was 55 %.  VALVES: AORTIC VALVE: No significant aortic valve gradient. MITRAL VALVE:  The mitral valve exhibited trivial regurgitation (less than 1+).  CORONARY VESSELS: The coronary circulation is right dominant.  LM:   --  LM: Normal. The vessel was normal sized, not calcified, and not  tortuous. Angiography showed no evidence of disease.  LAD:   --  LAD: Normal. The vessel was normal sized, not calcified, and not  tortuous. Angiography showed minor luminal irregularities with no flow  limiting lesions.  CX:   --  Circumflex: Normal. The vessel was normal sized, not calcified,  and excessively tortuous.Angiography showed no evidence of disease.  RCA:   --  RCA: Normal. The vessel was normal sized, not calcified, and  moderately tortuous. Angiography showed no evidence of disease.  COMPLICATIONS: There were no complications. No complications occurred  during the cath lab visit.  DIAGNOSTIC IMPRESSIONS: There is mild irregularity of the coronary anatomy.  Left ventricular function is normal (LVEF=55%).  DIAGNOSTIC RECOMMENDATIONS: The patient should continue with the present  medications. Patientmanagement should include aggressive medical therapy  and resumption of all previous activities in 2 days.    < end of copied text >                       10.3   3.2   )-----------( 190      ( 31 Aug 2018 07:56 )             34.8     08-31    136  |  95<L>  |  21.0<H>  ----------------------------<  174<H>  4.9   |  29.0  |  2.89<H>    Ca    8.5<L>      31 Aug 2018 07:56  Phos  4.1     08-31  Mg     2.1     08-31    TPro  7.1  /  Alb  3.4  /  TBili  0.6  /  DBili  x   /  AST  22  /  ALT  9   /  AlkPhos  110  08-30    HgA1c: Hemoglobin A1C, Whole Blood: 7.0 % (08-31 @ 07:56)    TSH: Thyroid Stimulating Hormone, Serum: 6.49 uIU/mL (08-31 @ 07:56)

## 2018-08-31 NOTE — PHYSICAL THERAPY INITIAL EVALUATION ADULT - PERTINENT HX OF CURRENT PROBLEM, REHAB EVAL
58 year old female esrd on hd, history of pericardial effusion, dm, a fib not on anticoagulation secondary to gi bleed, anemia, Pubic rami fracture, anemia. Patient was in her usual state of poor health when she became severely short of breath in hemo-dialysis. She was sent to the er where she was found to have a large pleural effusion and a chest tube was placed .

## 2018-09-01 LAB
ALBUMIN SERPL ELPH-MCNC: 1.6 G/DL — LOW (ref 3.3–5.2)
ALP SERPL-CCNC: 43 U/L — SIGNIFICANT CHANGE UP (ref 40–120)
ALT FLD-CCNC: <5 U/L — SIGNIFICANT CHANGE UP
ANION GAP SERPL CALC-SCNC: 13 MMOL/L — SIGNIFICANT CHANGE UP (ref 5–17)
ANION GAP SERPL CALC-SCNC: 28 MMOL/L — HIGH (ref 5–17)
APPEARANCE UR: ABNORMAL
AST SERPL-CCNC: 11 U/L — SIGNIFICANT CHANGE UP
BACTERIA # UR AUTO: ABNORMAL
BILIRUB SERPL-MCNC: 0.2 MG/DL — LOW (ref 0.4–2)
BILIRUB UR-MCNC: NEGATIVE — SIGNIFICANT CHANGE UP
BUN SERPL-MCNC: 20 MG/DL — SIGNIFICANT CHANGE UP (ref 8–20)
BUN SERPL-MCNC: 33 MG/DL — HIGH (ref 8–20)
CALCIUM SERPL-MCNC: 4.3 MG/DL — CRITICAL LOW (ref 8.6–10.2)
CALCIUM SERPL-MCNC: 8.1 MG/DL — LOW (ref 8.6–10.2)
CHLORIDE SERPL-SCNC: 90 MMOL/L — LOW (ref 98–107)
CHLORIDE SERPL-SCNC: 92 MMOL/L — LOW (ref 98–107)
CO2 SERPL-SCNC: 15 MMOL/L — LOW (ref 22–29)
CO2 SERPL-SCNC: 27 MMOL/L — SIGNIFICANT CHANGE UP (ref 22–29)
COLOR SPEC: YELLOW — SIGNIFICANT CHANGE UP
CREAT SERPL-MCNC: 2.13 MG/DL — HIGH (ref 0.5–1.3)
CREAT SERPL-MCNC: 4.13 MG/DL — HIGH (ref 0.5–1.3)
DIFF PNL FLD: ABNORMAL
EPI CELLS # UR: ABNORMAL
GLUCOSE BLDC GLUCOMTR-MCNC: 135 MG/DL — HIGH (ref 70–99)
GLUCOSE BLDC GLUCOMTR-MCNC: 139 MG/DL — HIGH (ref 70–99)
GLUCOSE BLDC GLUCOMTR-MCNC: 155 MG/DL — HIGH (ref 70–99)
GLUCOSE BLDC GLUCOMTR-MCNC: 202 MG/DL — HIGH (ref 70–99)
GLUCOSE SERPL-MCNC: 154 MG/DL — HIGH (ref 70–115)
GLUCOSE SERPL-MCNC: 93 MG/DL — SIGNIFICANT CHANGE UP (ref 70–115)
GLUCOSE UR QL: NEGATIVE MG/DL — SIGNIFICANT CHANGE UP
HCT VFR BLD CALC: 33.4 % — LOW (ref 37–47)
HGB BLD-MCNC: 10.1 G/DL — LOW (ref 12–16)
KETONES UR-MCNC: NEGATIVE — SIGNIFICANT CHANGE UP
LACTATE SERPL-SCNC: 0.6 MMOL/L — SIGNIFICANT CHANGE UP (ref 0.5–2)
LEUKOCYTE ESTERASE UR-ACNC: ABNORMAL
MCHC RBC-ENTMCNC: 28.4 PG — SIGNIFICANT CHANGE UP (ref 27–31)
MCHC RBC-ENTMCNC: 30.2 G/DL — LOW (ref 32–36)
MCV RBC AUTO: 93.8 FL — SIGNIFICANT CHANGE UP (ref 81–99)
MRSA PCR RESULT.: SIGNIFICANT CHANGE UP
NITRITE UR-MCNC: NEGATIVE — SIGNIFICANT CHANGE UP
PH UR: 7 — SIGNIFICANT CHANGE UP (ref 5–8)
PHOSPHATE SERPL-MCNC: 1.8 MG/DL — LOW (ref 2.4–4.7)
PLATELET # BLD AUTO: 208 K/UL — SIGNIFICANT CHANGE UP (ref 150–400)
POTASSIUM SERPL-MCNC: 5 MMOL/L — SIGNIFICANT CHANGE UP (ref 3.5–5.3)
POTASSIUM SERPL-MCNC: 5 MMOL/L — SIGNIFICANT CHANGE UP (ref 3.5–5.3)
POTASSIUM SERPL-SCNC: 5 MMOL/L — SIGNIFICANT CHANGE UP (ref 3.5–5.3)
POTASSIUM SERPL-SCNC: 5 MMOL/L — SIGNIFICANT CHANGE UP (ref 3.5–5.3)
PROCALCITONIN SERPL-MCNC: 0.52 NG/ML — HIGH (ref 0–0.04)
PROCALCITONIN SERPL-MCNC: 0.93 NG/ML — HIGH (ref 0–0.04)
PROT SERPL-MCNC: 3.2 G/DL — LOW (ref 6.6–8.7)
PROT UR-MCNC: 500 MG/DL
RBC # BLD: 3.56 M/UL — LOW (ref 4.4–5.2)
RBC # FLD: 14.6 % — SIGNIFICANT CHANGE UP (ref 11–15.6)
RBC CASTS # UR COMP ASSIST: >50 /HPF (ref 0–4)
S AUREUS DNA NOSE QL NAA+PROBE: SIGNIFICANT CHANGE UP
SODIUM SERPL-SCNC: 132 MMOL/L — LOW (ref 135–145)
SODIUM SERPL-SCNC: 133 MMOL/L — LOW (ref 135–145)
SP GR SPEC: 1.01 — SIGNIFICANT CHANGE UP (ref 1.01–1.02)
UROBILINOGEN FLD QL: NEGATIVE MG/DL — SIGNIFICANT CHANGE UP
WBC # BLD: 3.6 K/UL — LOW (ref 4.8–10.8)
WBC # FLD AUTO: 3.6 K/UL — LOW (ref 4.8–10.8)
WBC UR QL: >50

## 2018-09-01 PROCEDURE — 71045 X-RAY EXAM CHEST 1 VIEW: CPT | Mod: 26

## 2018-09-01 PROCEDURE — 99233 SBSQ HOSP IP/OBS HIGH 50: CPT

## 2018-09-01 PROCEDURE — 99222 1ST HOSP IP/OBS MODERATE 55: CPT

## 2018-09-01 RX ORDER — CALCIUM CARBONATE 500(1250)
1 TABLET ORAL THREE TIMES A DAY
Qty: 0 | Refills: 0 | Status: DISCONTINUED | OUTPATIENT
Start: 2018-09-01 | End: 2018-09-08

## 2018-09-01 RX ORDER — CALCITRIOL 0.5 UG/1
0.25 CAPSULE ORAL
Qty: 0 | Refills: 0 | Status: DISCONTINUED | OUTPATIENT
Start: 2018-09-01 | End: 2018-09-08

## 2018-09-01 RX ORDER — PIPERACILLIN AND TAZOBACTAM 4; .5 G/20ML; G/20ML
2.25 INJECTION, POWDER, LYOPHILIZED, FOR SOLUTION INTRAVENOUS EVERY 12 HOURS
Qty: 0 | Refills: 0 | Status: COMPLETED | OUTPATIENT
Start: 2018-09-01 | End: 2018-09-07

## 2018-09-01 RX ORDER — PIPERACILLIN AND TAZOBACTAM 4; .5 G/20ML; G/20ML
3.38 INJECTION, POWDER, LYOPHILIZED, FOR SOLUTION INTRAVENOUS EVERY 12 HOURS
Qty: 0 | Refills: 0 | Status: DISCONTINUED | OUTPATIENT
Start: 2018-09-01 | End: 2018-09-01

## 2018-09-01 RX ORDER — CALCIUM GLUCONATE 100 MG/ML
1 VIAL (ML) INTRAVENOUS ONCE
Qty: 0 | Refills: 0 | Status: COMPLETED | OUTPATIENT
Start: 2018-09-01 | End: 2018-09-01

## 2018-09-01 RX ORDER — VANCOMYCIN HCL 1 G
1000 VIAL (EA) INTRAVENOUS ONCE
Qty: 0 | Refills: 0 | Status: COMPLETED | OUTPATIENT
Start: 2018-09-01 | End: 2018-09-01

## 2018-09-01 RX ORDER — VANCOMYCIN HCL 1 G
15 VIAL (EA) INTRAVENOUS ONCE
Qty: 0 | Refills: 0 | Status: DISCONTINUED | OUTPATIENT
Start: 2018-09-01 | End: 2018-09-01

## 2018-09-01 RX ORDER — VANCOMYCIN HCL 1 G
1000 VIAL (EA) INTRAVENOUS ONCE
Qty: 0 | Refills: 0 | Status: DISCONTINUED | OUTPATIENT
Start: 2018-09-01 | End: 2018-09-01

## 2018-09-01 RX ORDER — SODIUM CHLORIDE 9 MG/ML
1000 INJECTION, SOLUTION INTRAVENOUS
Qty: 0 | Refills: 0 | Status: DISCONTINUED | OUTPATIENT
Start: 2018-09-01 | End: 2018-09-06

## 2018-09-01 RX ADMIN — CHLORHEXIDINE GLUCONATE 1 APPLICATION(S): 213 SOLUTION TOPICAL at 06:04

## 2018-09-01 RX ADMIN — SODIUM CHLORIDE 43 MILLILITER(S): 9 INJECTION, SOLUTION INTRAVENOUS at 18:05

## 2018-09-01 RX ADMIN — HEPARIN SODIUM 5000 UNIT(S): 5000 INJECTION INTRAVENOUS; SUBCUTANEOUS at 18:06

## 2018-09-01 RX ADMIN — Medication 650 MILLIGRAM(S): at 07:53

## 2018-09-01 RX ADMIN — Medication 100 MILLIGRAM(S): at 06:04

## 2018-09-01 RX ADMIN — Medication 200 GRAM(S): at 15:18

## 2018-09-01 RX ADMIN — Medication 1 TABLET(S): at 21:35

## 2018-09-01 RX ADMIN — Medication 325 MILLIGRAM(S): at 14:42

## 2018-09-01 RX ADMIN — CARVEDILOL PHOSPHATE 12.5 MILLIGRAM(S): 80 CAPSULE, EXTENDED RELEASE ORAL at 18:10

## 2018-09-01 RX ADMIN — Medication 100 MILLIGRAM(S): at 18:06

## 2018-09-01 RX ADMIN — PIPERACILLIN AND TAZOBACTAM 200 GRAM(S): 4; .5 INJECTION, POWDER, LYOPHILIZED, FOR SOLUTION INTRAVENOUS at 19:11

## 2018-09-01 RX ADMIN — SACUBITRIL AND VALSARTAN 1 TABLET(S): 24; 26 TABLET, FILM COATED ORAL at 18:07

## 2018-09-01 RX ADMIN — Medication 4: at 17:18

## 2018-09-01 RX ADMIN — Medication 1 TABLET(S): at 15:19

## 2018-09-01 RX ADMIN — Medication 250 MILLIGRAM(S): at 14:41

## 2018-09-01 RX ADMIN — SACUBITRIL AND VALSARTAN 1 TABLET(S): 24; 26 TABLET, FILM COATED ORAL at 06:04

## 2018-09-01 RX ADMIN — HEPARIN SODIUM 5000 UNIT(S): 5000 INJECTION INTRAVENOUS; SUBCUTANEOUS at 06:04

## 2018-09-01 RX ADMIN — Medication 81 MILLIGRAM(S): at 14:41

## 2018-09-01 RX ADMIN — CARVEDILOL PHOSPHATE 12.5 MILLIGRAM(S): 80 CAPSULE, EXTENDED RELEASE ORAL at 06:04

## 2018-09-01 RX ADMIN — CALCITRIOL 0.25 MICROGRAM(S): 0.5 CAPSULE ORAL at 18:06

## 2018-09-01 NOTE — PROGRESS NOTE ADULT - ASSESSMENT
The patient is a 58 year old female with a history of ESRD on HD MWF, afib not on anticoagulation given history of GIB, chronic diastolic CHF who presented to the ER with complaints of worsening shortness of breath. CT on admission consistent with large right pleural effusion now status post pigtail placement by CTS.     Assessment/Plan:    1. Sepsis secondary to right upper lobe pneumonia with possible parapneumonic effusion: IV zosyn, Blood cultures x 2, ID consulted. CT in place. PRelim pleural fluid cultures negative thus far.    2. ESRD on HD MWF: Renal following. HD this morning.     3. Acute on chronic systolic CHF with NICM: Echocardiogram with  global hypokinesia with low ef of 25%. Spoke with cardio, cath early this  year with non obstructive CAD. Added entresto; on BB. Cardio following.     4. AFib: Rate controlled; Unable to tolerate coumadin due to GI bleed. Was being evaluated for Watchmen procedure, however patient missed appointment in office.      5. Pericardial effusion - hx of uremic pericarditis, currently stable.  No pericardial effusion on echo; colchicine discontinued.     6. Diabetes mellitus type 2: HSS> MOnitor bsl.     7. Hypocalcemia: Corrected for albumin- 6.2. IV Cagluc orderedx1    VTE- Heparin Subcut

## 2018-09-01 NOTE — PROGRESS NOTE ADULT - ASSESSMENT
A/P: 58 year old female with ESRD on HD via R permacath. LANDONE AV graft done 3/28/18.  -Left arm AV graft ok to use; patient going for HD today 9/1  -If next HD sessions go well through the graft, our team will remove the permacath next week

## 2018-09-01 NOTE — PROGRESS NOTE ADULT - SUBJECTIVE AND OBJECTIVE BOX
NEPHROLOGY INTERVAL HPI/OVERNIGHT EVENTS:    Seen at HD   ID follow up noted   No issues with using L arm access     MEDICATIONS  (STANDING):  aspirin enteric coated 81 milliGRAM(s) Oral daily  calcium gluconate IVPB 1 Gram(s) IV Intermittent once  carvedilol 12.5 milliGRAM(s) Oral every 12 hours  chlorhexidine 2% Cloths 1 Application(s) Topical <User Schedule>  dextrose 5%. 1000 milliLiter(s) (50 mL/Hr) IV Continuous <Continuous>  dextrose 50% Injectable 12.5 Gram(s) IV Push once  dextrose 50% Injectable 25 Gram(s) IV Push once  dextrose 50% Injectable 25 Gram(s) IV Push once  docusate sodium 100 milliGRAM(s) Oral two times a day  ferrous    sulfate 325 milliGRAM(s) Oral daily  heparin  Injectable 5000 Unit(s) SubCutaneous every 12 hours  insulin lispro (HumaLOG) corrective regimen sliding scale   SubCutaneous three times a day before meals  insulin lispro (HumaLOG) corrective regimen sliding scale   SubCutaneous at bedtime  piperacillin/tazobactam IVPB. 2.25 Gram(s) IV Intermittent every 12 hours  sacubitril 24 mG/valsartan 26 mG 1 Tablet(s) Oral two times a day  vancomycin  IVPB 1000 milliGRAM(s) IV Intermittent once    MEDICATIONS  (PRN):  acetaminophen   Tablet 650 milliGRAM(s) Oral every 6 hours PRN For Temp greater than 38 C (100.4 F)  acetaminophen   Tablet. 650 milliGRAM(s) Oral every 6 hours PRN Mild Pain (1 - 3)  dextrose 40% Gel 15 Gram(s) Oral once PRN Blood Glucose LESS THAN 70 milliGRAM(s)/deciliter  glucagon  Injectable 1 milliGRAM(s) IntraMuscular once PRN Glucose LESS THAN 70 milligrams/deciliter  morphine  - Injectable 2 milliGRAM(s) IV Push every 4 hours PRN Severe Pain (7 - 10)  oxyCODONE    5 mG/acetaminophen 325 mG 2 Tablet(s) Oral every 4 hours PRN Moderate Pain (4 - 6)  senna 2 Tablet(s) Oral at bedtime PRN Constipation      Allergies    No Known Allergies    Intolerances        Vital Signs Last 24 Hrs  T(C): 36.9 (01 Sep 2018 10:25), Max: 39 (01 Sep 2018 07:49)  T(F): 98.4 (01 Sep 2018 10:25), Max: 102.2 (01 Sep 2018 07:49)  HR: 76 (01 Sep 2018 10:25) (75 - 82)  BP: 113/68 (01 Sep 2018 10:25) (113/68 - 125/75)  BP(mean): --  RR: 18 (01 Sep 2018 10:25) (18 - 20)  SpO2: 93% (01 Sep 2018 10:25) (93% - 100%)  Daily     Daily Weight in k.1 (01 Sep 2018 10:25)  I&O's Detail    31 Aug 2018 07:  -  01 Sep 2018 07:00  --------------------------------------------------------  IN:    Oral Fluid: 720 mL  Total IN: 720 mL    OUT:    Chest Tube: 450 mL  Total OUT: 450 mL    Total NET: 270 mL        I&O's Summary    31 Aug 2018 07:01  -  01 Sep 2018 07:00  --------------------------------------------------------  IN: 720 mL / OUT: 450 mL / NET: 270 mL        PHYSICAL EXAM:  GENERAL: NAD,   EYES:  conjunctiva and sclera clear  NECK: Supple, No JVD/Bruit  NERVOUS SYSTEM:  A/O x3,   CHEST:  No rales or rhonchi; poor BS bases; R CT noted  HEART:  RRR, No murmur  ABDOMEN: Soft, NT/ND BS+  EXTREMITIES:  No Edema; L Ua AVF good thrill and bruit    LABS:                        10.1   3.6   )-----------( 208      ( 01 Sep 2018 06:59 )             33.4         133<L>  |  90<L>  |  20.0  ----------------------------<  93  5.0   |  15.0<L>  |  2.13<H>    Ca    4.3<LL>      01 Sep 2018 08:39  Phos  1.8     09-01  Mg     2.1         TPro  3.2<L>  /  Alb  1.6<L>  /  TBili  0.2<L>  /  DBili  x   /  AST  11  /  ALT  <5  /  AlkPhos  43        Urinalysis Basic - ( 01 Sep 2018 10:13 )    Color: Yellow / Appearance: Slightly Turbid / S.010 / pH: x  Gluc: x / Ketone: Negative  / Bili: Negative / Urobili: Negative mg/dL   Blood: x / Protein: 500 mg/dL / Nitrite: Negative   Leuk Esterase: Moderate / RBC: >50 /HPF / WBC >50   Sq Epi: x / Non Sq Epi: Moderate / Bacteria: Moderate      Phosphorus Level, Serum: 1.8 mg/dL ( @ 08:39)     EXAM:  CT CHEST                          PROCEDURE DATE:  2018          INTERPRETATION:  CLINICAL INFORMATION: Shortness of breath and hemoptysis.    TECHNIQUE: Noncontrast CT scan of the chest was performed from the   thoracic inlet to the adrenal glands with coronal and sagittal reformats.     COMPARISON: CT chest 3/14/2018.    FINDINGS:   Evaluation of the mediastinum, pleura and soft tissues is limited without   intravenous contrast.    Lungs and airways: The trachea and main bronchiare patent. Nodular   airspace consolidation with air bronchogram in the right upper lobe and   middle lobes with near complete airspace consolidation of the right lower   lobe. Diffuse groundglass opacities throughout the lungs. Linear   atelectasisand/or scarring in the lingula. Small bilateral pleural   effusions with compressive atelectasis. No pneumothorax.     Heart and vessels: Right chest [id number] catheter with tip at the   cavoatrial junction. Multichamber cardiomegaly. No pericardial effusion.   The thoracic aorta and main pulmonary artery are normal caliber.   Partially imaged left upper extremity vascular graft.    Mediastinum and soft tissues: The thyroid gland is unremarkable. There is   no axillary, mediastinal or hilar adenopathy.     Upper abdomen: Ascites. Hypodense renal lesions too small to characterize   and bilateral renal cysts.     Bones: Within normal limits.    IMPRESSION:   Nodular multilobar airspace consolidation, most pronounced in the right   lung. Diffuse groundglass opacities. Linear atelectasis and/or scarring   in the lingula. Small bilateral pleural effusions and compressive   atelectasis.    Differential diagnosis may include pneumonia, pulmonary edema, hemorrhage   or inflammatory/neoplasticinfiltrates, dependently on the clinical   setting.                GREGG CHRISTY M.D., ATTENDING RADIOLOGIST  This document has been electronically signed. Aug 30 2018  3:46AM        RADIOLOGY & ADDITIONAL TESTS:

## 2018-09-01 NOTE — CHART NOTE - NSCHARTNOTEFT_GEN_A_CORE
Code sepsis PGY 2 Note    Patient is a 58y old  Female with PMH of ESRD on HD MWF, afib not on anticoagulation given history of GIB, chronic diastolic CHF who presented to the ER with complaints of worsening shortness of breath. CT on admission consistent with large right pleural effusion now status post pigtail placement by CTS.    Code sepsis called because rectal temp 102.2 and WBC 3.6    Patient was seen and examined at the bedside by the rapid response team.    Allergies    No Known Allergies    Intolerances    PAST MEDICAL & SURGICAL HISTORY:  Pleural effusion  Pericardial effusion  End stage renal disease on dialysis  HLD (hyperlipidemia)  HTN (hypertension)  DM (diabetes mellitus)  A-V fistula  Encounter for dialysis catheter care      Vital Signs Last 24 Hrs  T(F): 102.2 (01 Sep 2018 07:49), Max: 102.2 (01 Sep 2018 07:49)  HR: 77 (01 Sep 2018 07:49) (70 - 82)  BP: 124/73  RR: 20 (01 Sep 2018 04:10) (19 - 20)  SpO2: 94% (31 Aug 2018 20:58) (94% - 100%) O2 4L nasal canula          General: Well nourished/Well developed, NAD  Head:  Normocephalic, atraumatic  ENT:  Mucosa moist, no ulcerations  Neck:  Supple, no sinuses or palpable masses. Permacath on left neck, tender to palpation but no secretion, warmness noted  Respiratory: Expiratory bilateral mild crackles. Rest CTA B/L  CV: RRR, S1S2, no murmur  Back: cehst tube noted on right back at level of midline axilary line. No secretions noted  Abdominal: Soft, NT, ND no palpable mass  Extremities: No edema, + peripheral pulses, FROM all 4 extremity  Neurology: A&Ox3, no focalization signs  Incisions: intact, no erythema or drainage      08-31 @ 07:01  -  09-01 @ 07:00  --------------------------------------------------------  IN: 720 mL / OUT: 450 mL / NET: 270 mL                            10.1   3.6   )-----------( 208      ( 01 Sep 2018 06:59 )             33.4     09-01    132<L>  |  92<L>  |  33.0<H>  ----------------------------<  154<H>  5.0   |  27.0  |  4.13<H>    Ca    8.1<L>      01 Sep 2018 06:59  Phos  4.1     08-31  Mg     2.1     08-31             MEDICATIONS  (STANDING):  aspirin enteric coated 81 milliGRAM(s) Oral daily  azithromycin  IVPB      azithromycin  IVPB 500 milliGRAM(s) IV Intermittent every 24 hours  carvedilol 12.5 milliGRAM(s) Oral every 12 hours  cefTRIAXone   IVPB 1 Gram(s) IV Intermittent every 24 hours  cefTRIAXone   IVPB      chlorhexidine 2% Cloths 1 Application(s) Topical <User Schedule>  dextrose 5%. 1000 milliLiter(s) (50 mL/Hr) IV Continuous <Continuous>  dextrose 50% Injectable 12.5 Gram(s) IV Push once  dextrose 50% Injectable 25 Gram(s) IV Push once  dextrose 50% Injectable 25 Gram(s) IV Push once  docusate sodium 100 milliGRAM(s) Oral two times a day  ferrous    sulfate 325 milliGRAM(s) Oral daily  heparin  Injectable 5000 Unit(s) SubCutaneous every 12 hours  insulin lispro (HumaLOG) corrective regimen sliding scale   SubCutaneous three times a day before meals  insulin lispro (HumaLOG) corrective regimen sliding scale   SubCutaneous at bedtime  piperacillin/tazobactam IVPB. 3.375 Gram(s) IV Intermittent every 12 hours  sacubitril 24 mG/valsartan 26 mG 1 Tablet(s) Oral two times a day    MEDICATIONS  (PRN):  acetaminophen   Tablet 650 milliGRAM(s) Oral every 6 hours PRN For Temp greater than 38 C (100.4 F)  acetaminophen   Tablet. 650 milliGRAM(s) Oral every 6 hours PRN Mild Pain (1 - 3)  dextrose 40% Gel 15 Gram(s) Oral once PRN Blood Glucose LESS THAN 70 milliGRAM(s)/deciliter  glucagon  Injectable 1 milliGRAM(s) IntraMuscular once PRN Glucose LESS THAN 70 milligrams/deciliter  morphine  - Injectable 2 milliGRAM(s) IV Push every 4 hours PRN Severe Pain (7 - 10)  oxyCODONE    5 mG/acetaminophen 325 mG 2 Tablet(s) Oral every 4 hours PRN Moderate Pain (4 - 6)  senna 2 Tablet(s) Oral at bedtime PRN Constipation      Assessment- Rapid Response called for 58y year old Female with a past medical history of ESRD on HD MWF, afib not on anticoagulation given history of GIB, chronic diastolic CHF who presented to the ER with complaints of worsening shortness of breath. Code sepsis called due to WBC 3.6 and temp 3.6 concerning for pneumonia vs otgher sopurse.     Plan-  Patient currently on azythro and Rocephin. Will broad coverage to zosyn and Vancomycin, concerning for HCAP since patient still has fever and has been on HD. Will give Abx after dyalisis as per discussion with attending  EICU called but as per attending, Cameron not need for now  EICU nurse at bedside  Follow up BCx x2, UCx, lactate.   CXray done, seems mildly improving from previous, less fluid overload but will wait for official report  Case discussed with Courtney BAZZI and attending, Dr Barnes  Will consul ID  Will follow in 2 hrs Code sepsis PGY 2 Note    Patient is a 58y old  Female with PMH of ESRD on HD MWF, afib not on anticoagulation given history of GIB, chronic diastolic CHF who presented to the ER with complaints of worsening shortness of breath. CT on admission consistent with large right pleural effusion now status post pigtail placement by CTS.    Code sepsis called because rectal temp 102.2 and WBC 3.6    Patient was seen and examined at the bedside by the rapid response team.    Allergies    No Known Allergies    Intolerances    PAST MEDICAL & SURGICAL HISTORY:  Pleural effusion  Pericardial effusion  End stage renal disease on dialysis  HLD (hyperlipidemia)  HTN (hypertension)  DM (diabetes mellitus)  A-V fistula  Encounter for dialysis catheter care      Vital Signs Last 24 Hrs  T(F): 102.2 (01 Sep 2018 07:49), Max: 102.2 (01 Sep 2018 07:49)  HR: 77 (01 Sep 2018 07:49) (70 - 82)  BP: 124/73  RR: 20 (01 Sep 2018 04:10) (19 - 20)  SpO2: 94% (31 Aug 2018 20:58) (94% - 100%) O2 4L nasal canula          General: Well nourished/Well developed, NAD  Head:  Normocephalic, atraumatic  ENT:  Mucosa moist, no ulcerations  Neck:  Supple, no sinuses or palpable masses. Permacath on left neck, tender to palpation but no secretion, warmness noted  Respiratory: Expiratory bilateral mild crackles. Rest CTA B/L  CV: RRR, S1S2, no murmur  Back: cehst tube noted on right back at level of midline axilary line. No secretions noted  Abdominal: Soft, NT, ND no palpable mass  Extremities: No edema, + peripheral pulses, FROM all 4 extremity  Neurology: A&Ox3, no focalization signs  Incisions: intact, no erythema or drainage      08-31 @ 07:01  -  09-01 @ 07:00  --------------------------------------------------------  IN: 720 mL / OUT: 450 mL / NET: 270 mL                            10.1   3.6   )-----------( 208      ( 01 Sep 2018 06:59 )             33.4     09-01    132<L>  |  92<L>  |  33.0<H>  ----------------------------<  154<H>  5.0   |  27.0  |  4.13<H>    Ca    8.1<L>      01 Sep 2018 06:59  Phos  4.1     08-31  Mg     2.1     08-31             MEDICATIONS  (STANDING):  aspirin enteric coated 81 milliGRAM(s) Oral daily  azithromycin  IVPB      azithromycin  IVPB 500 milliGRAM(s) IV Intermittent every 24 hours  carvedilol 12.5 milliGRAM(s) Oral every 12 hours  cefTRIAXone   IVPB 1 Gram(s) IV Intermittent every 24 hours  cefTRIAXone   IVPB      chlorhexidine 2% Cloths 1 Application(s) Topical <User Schedule>  dextrose 5%. 1000 milliLiter(s) (50 mL/Hr) IV Continuous <Continuous>  dextrose 50% Injectable 12.5 Gram(s) IV Push once  dextrose 50% Injectable 25 Gram(s) IV Push once  dextrose 50% Injectable 25 Gram(s) IV Push once  docusate sodium 100 milliGRAM(s) Oral two times a day  ferrous    sulfate 325 milliGRAM(s) Oral daily  heparin  Injectable 5000 Unit(s) SubCutaneous every 12 hours  insulin lispro (HumaLOG) corrective regimen sliding scale   SubCutaneous three times a day before meals  insulin lispro (HumaLOG) corrective regimen sliding scale   SubCutaneous at bedtime  piperacillin/tazobactam IVPB. 3.375 Gram(s) IV Intermittent every 12 hours  sacubitril 24 mG/valsartan 26 mG 1 Tablet(s) Oral two times a day    MEDICATIONS  (PRN):  acetaminophen   Tablet 650 milliGRAM(s) Oral every 6 hours PRN For Temp greater than 38 C (100.4 F)  acetaminophen   Tablet. 650 milliGRAM(s) Oral every 6 hours PRN Mild Pain (1 - 3)  dextrose 40% Gel 15 Gram(s) Oral once PRN Blood Glucose LESS THAN 70 milliGRAM(s)/deciliter  glucagon  Injectable 1 milliGRAM(s) IntraMuscular once PRN Glucose LESS THAN 70 milligrams/deciliter  morphine  - Injectable 2 milliGRAM(s) IV Push every 4 hours PRN Severe Pain (7 - 10)  oxyCODONE    5 mG/acetaminophen 325 mG 2 Tablet(s) Oral every 4 hours PRN Moderate Pain (4 - 6)  senna 2 Tablet(s) Oral at bedtime PRN Constipation      Assessment- Rapid Response called for 58y year old Female with a past medical history of ESRD on HD MWF, afib not on anticoagulation given history of GIB, chronic diastolic CHF who presented to the ER with complaints of worsening shortness of breath. Code sepsis called due to WBC 3.6 and temp 3.6 concerning for pneumonia vs otgher sopurse.     Plan-  Patient currently on azythro and Rocephin. Will broad coverage to zosyn and Vancomycin, concerning for HCAP since patient still has fever and has been on HD. Will give Abx after dyalisis as per discussion with attending  EICU initially called, Dr Mary Villalta, who was soon after taken to another rapid response. As per attending, Cameron not need ICU for now  EICU nurse at bedside  Follow up BCx x2, UCx, lactate.   CXray done, seems mildly improving from previous, less fluid overload but will wait for official report  Case discussed with Courtney BAZZI and attending, Dr Barnes  Will consul ID  Patient to go to HD as per attending  Will follow in 2 hrs Code sepsis PGY 2 Note    Patient is a 58y old  Female with PMH of ESRD on HD MWF, afib not on anticoagulation given history of GIB, chronic diastolic CHF who presented to the ER with complaints of worsening shortness of breath. CT on admission consistent with large right pleural effusion now status post pigtail placement by CTS.    Code sepsis called because rectal temp 102.2 and WBC 3.6    Patient was seen and examined at the bedside by the rapid response team.    Allergies    No Known Allergies    Intolerances    PAST MEDICAL & SURGICAL HISTORY:  Pleural effusion  Pericardial effusion  End stage renal disease on dialysis  HLD (hyperlipidemia)  HTN (hypertension)  DM (diabetes mellitus)  A-V fistula  Encounter for dialysis catheter care      Vital Signs Last 24 Hrs  T(F): 102.2 (01 Sep 2018 07:49), Max: 102.2 (01 Sep 2018 07:49)  HR: 77 (01 Sep 2018 07:49) (70 - 82)  BP: 124/73  RR: 20 (01 Sep 2018 04:10) (19 - 20)  SpO2: 94% (31 Aug 2018 20:58) (94% - 100%) O2 4L nasal canula          General: Well nourished/Well developed, NAD  Head:  Normocephalic, atraumatic  ENT:  Mucosa moist, no ulcerations  Neck:  Supple, no sinuses or palpable masses. Permacath on left neck, tender to palpation but no secretion, warmness noted  Respiratory: Expiratory bilateral mild crackles. Rest CTA B/L  CV: RRR, S1S2, no murmur  Back: cehst tube noted on right back at level of midline axilary line. No secretions noted  Abdominal: Soft, NT, ND no palpable mass  Extremities: No edema, + peripheral pulses, FROM all 4 extremity  Neurology: A&Ox3, no focalization signs  Incisions: intact, no erythema or drainage      08-31 @ 07:01  -  09-01 @ 07:00  --------------------------------------------------------  IN: 720 mL / OUT: 450 mL / NET: 270 mL                            10.1   3.6   )-----------( 208      ( 01 Sep 2018 06:59 )             33.4     09-01    132<L>  |  92<L>  |  33.0<H>  ----------------------------<  154<H>  5.0   |  27.0  |  4.13<H>    Ca    8.1<L>      01 Sep 2018 06:59  Phos  4.1     08-31  Mg     2.1     08-31             MEDICATIONS  (STANDING):  aspirin enteric coated 81 milliGRAM(s) Oral daily  azithromycin  IVPB      azithromycin  IVPB 500 milliGRAM(s) IV Intermittent every 24 hours  carvedilol 12.5 milliGRAM(s) Oral every 12 hours  cefTRIAXone   IVPB 1 Gram(s) IV Intermittent every 24 hours  cefTRIAXone   IVPB      chlorhexidine 2% Cloths 1 Application(s) Topical <User Schedule>  dextrose 5%. 1000 milliLiter(s) (50 mL/Hr) IV Continuous <Continuous>  dextrose 50% Injectable 12.5 Gram(s) IV Push once  dextrose 50% Injectable 25 Gram(s) IV Push once  dextrose 50% Injectable 25 Gram(s) IV Push once  docusate sodium 100 milliGRAM(s) Oral two times a day  ferrous    sulfate 325 milliGRAM(s) Oral daily  heparin  Injectable 5000 Unit(s) SubCutaneous every 12 hours  insulin lispro (HumaLOG) corrective regimen sliding scale   SubCutaneous three times a day before meals  insulin lispro (HumaLOG) corrective regimen sliding scale   SubCutaneous at bedtime  piperacillin/tazobactam IVPB. 3.375 Gram(s) IV Intermittent every 12 hours  sacubitril 24 mG/valsartan 26 mG 1 Tablet(s) Oral two times a day    MEDICATIONS  (PRN):  acetaminophen   Tablet 650 milliGRAM(s) Oral every 6 hours PRN For Temp greater than 38 C (100.4 F)  acetaminophen   Tablet. 650 milliGRAM(s) Oral every 6 hours PRN Mild Pain (1 - 3)  dextrose 40% Gel 15 Gram(s) Oral once PRN Blood Glucose LESS THAN 70 milliGRAM(s)/deciliter  glucagon  Injectable 1 milliGRAM(s) IntraMuscular once PRN Glucose LESS THAN 70 milligrams/deciliter  morphine  - Injectable 2 milliGRAM(s) IV Push every 4 hours PRN Severe Pain (7 - 10)  oxyCODONE    5 mG/acetaminophen 325 mG 2 Tablet(s) Oral every 4 hours PRN Moderate Pain (4 - 6)  senna 2 Tablet(s) Oral at bedtime PRN Constipation      Assessment- Rapid Response called for 58y year old Female with a past medical history of ESRD on HD MWF, afib not on anticoagulation given history of GIB, chronic diastolic CHF who presented to the ER with complaints of worsening shortness of breath. Code sepsis called due to WBC 3.6 and temp 3.6 concerning for pneumonia vs otgher sopurse.     Plan-  Patient currently on azythro and Rocephin. Will broad coverage to zosyn and Vancomycin, concerning for HCAP since patient still has fever and has been on HD. Will give Abx after dyalisis as per discussion with attending  EICU initially called, Dr Mary Villalta, who was soon after taken to another rapid response. She stated to recall if neede. As per attending, Cameron not need ICU for now  EICU nurse at bedside  Follow up BCx x2, UCx, lactate.   CXray done, seems mildly improving from previous, less fluid overload but will wait for official report  Case discussed with Courtney BAZZI and attending, Dr Barnes  Will consul ID  Patient to go to HD as per attending  Will follow in 2 hrs Code sepsis PGY 2 Note    Patient is a 58y old  Female with PMH of ESRD on HD MWF, afib not on anticoagulation given history of GIB, chronic diastolic CHF who presented to the ER with complaints of worsening shortness of breath. CT on admission consistent with large right pleural effusion now status post pigtail placement by CTS.    Code sepsis called because rectal temp 102.2 and WBC 3.6    Patient was seen and examined at the bedside by the rapid response team.    Allergies    No Known Allergies    Intolerances    PAST MEDICAL & SURGICAL HISTORY:  Pleural effusion  Pericardial effusion  End stage renal disease on dialysis  HLD (hyperlipidemia)  HTN (hypertension)  DM (diabetes mellitus)  A-V fistula  Encounter for dialysis catheter care      Vital Signs Last 24 Hrs  T(F): 102.2 (01 Sep 2018 07:49), Max: 102.2 (01 Sep 2018 07:49)  HR: 77 (01 Sep 2018 07:49) (70 - 82)  BP: 124/73  RR: 20 (01 Sep 2018 04:10) (19 - 20)  SpO2: 94% (31 Aug 2018 20:58) (94% - 100%) O2 4L nasal canula          General: Well nourished/Well developed, NAD  Head:  Normocephalic, atraumatic  ENT:  Mucosa moist, no ulcerations  Neck:  Supple, no sinuses or palpable masses. Permacath on left neck, tender to palpation but no secretion, warmness noted  Respiratory: Expiratory bilateral mild crackles. Rest CTA B/L  CV: RRR, S1S2, no murmur  Back: cehst tube noted on right back at level of midline axilary line. No secretions noted  Abdominal: Soft, NT, ND no palpable mass  Extremities: No edema, + peripheral pulses, FROM all 4 extremity  Neurology: A&Ox3, no focalization signs  Incisions: intact, no erythema or drainage      08-31 @ 07:01  -  09-01 @ 07:00  --------------------------------------------------------  IN: 720 mL / OUT: 450 mL / NET: 270 mL                            10.1   3.6   )-----------( 208      ( 01 Sep 2018 06:59 )             33.4     09-01    132<L>  |  92<L>  |  33.0<H>  ----------------------------<  154<H>  5.0   |  27.0  |  4.13<H>    Ca    8.1<L>      01 Sep 2018 06:59  Phos  4.1     08-31  Mg     2.1     08-31             MEDICATIONS  (STANDING):  aspirin enteric coated 81 milliGRAM(s) Oral daily  azithromycin  IVPB      azithromycin  IVPB 500 milliGRAM(s) IV Intermittent every 24 hours  carvedilol 12.5 milliGRAM(s) Oral every 12 hours  cefTRIAXone   IVPB 1 Gram(s) IV Intermittent every 24 hours  cefTRIAXone   IVPB      chlorhexidine 2% Cloths 1 Application(s) Topical <User Schedule>  dextrose 5%. 1000 milliLiter(s) (50 mL/Hr) IV Continuous <Continuous>  dextrose 50% Injectable 12.5 Gram(s) IV Push once  dextrose 50% Injectable 25 Gram(s) IV Push once  dextrose 50% Injectable 25 Gram(s) IV Push once  docusate sodium 100 milliGRAM(s) Oral two times a day  ferrous    sulfate 325 milliGRAM(s) Oral daily  heparin  Injectable 5000 Unit(s) SubCutaneous every 12 hours  insulin lispro (HumaLOG) corrective regimen sliding scale   SubCutaneous three times a day before meals  insulin lispro (HumaLOG) corrective regimen sliding scale   SubCutaneous at bedtime  piperacillin/tazobactam IVPB. 3.375 Gram(s) IV Intermittent every 12 hours  sacubitril 24 mG/valsartan 26 mG 1 Tablet(s) Oral two times a day    MEDICATIONS  (PRN):  acetaminophen   Tablet 650 milliGRAM(s) Oral every 6 hours PRN For Temp greater than 38 C (100.4 F)  acetaminophen   Tablet. 650 milliGRAM(s) Oral every 6 hours PRN Mild Pain (1 - 3)  dextrose 40% Gel 15 Gram(s) Oral once PRN Blood Glucose LESS THAN 70 milliGRAM(s)/deciliter  glucagon  Injectable 1 milliGRAM(s) IntraMuscular once PRN Glucose LESS THAN 70 milligrams/deciliter  morphine  - Injectable 2 milliGRAM(s) IV Push every 4 hours PRN Severe Pain (7 - 10)  oxyCODONE    5 mG/acetaminophen 325 mG 2 Tablet(s) Oral every 4 hours PRN Moderate Pain (4 - 6)  senna 2 Tablet(s) Oral at bedtime PRN Constipation      Assessment- Rapid Response called for 58y year old Female with a past medical history of ESRD on HD MWF, afib not on anticoagulation given history of GIB, chronic diastolic CHF who presented to the ER with complaints of worsening shortness of breath. Code sepsis called due to WBC 3.6 and temp 3.6 concerning for pneumonia vs otgher sopurse.     Plan-  Patient currently on azythro and Rocephin. Will broad coverage to zosyn and Vancomycin, concerning for HCAP since patient still has fever and has been on HD. Will give Abx after dyalisis as per discussion with attending  EICU initially called, Dr Mary Villalta, who was soon after taken to another rapid response. She stated to recall if neede. As per attending, Cameron not need ICU for now  EICU nurse at bedside  Follow up BCx x2, UCx, lactate.   CXray done, seems less congested compared but will wait for official report  Case discussed with Courtney BAZZI and attending, Dr Barnes  Will consul ID  Patient to go to HD as per attending  Will follow in 2 hrs Code sepsis PGY 2 Note    Patient is a 58y old  Female with PMH of ESRD on HD MWF, afib not on anticoagulation given history of GIB, chronic diastolic CHF who presented to the ER with complaints of worsening shortness of breath. CT on admission consistent with large right pleural effusion now status post pigtail placement by CTS.    Code sepsis called because rectal temp 102.2 and WBC 3.6    Patient was seen and examined at the bedside by the rapid response team.    Allergies    No Known Allergies    Intolerances    PAST MEDICAL & SURGICAL HISTORY:  Pleural effusion  Pericardial effusion  End stage renal disease on dialysis  HLD (hyperlipidemia)  HTN (hypertension)  DM (diabetes mellitus)  A-V fistula  Encounter for dialysis catheter care      Vital Signs Last 24 Hrs  T(F): 102.2 (01 Sep 2018 07:49), Max: 102.2 (01 Sep 2018 07:49)  HR: 77 (01 Sep 2018 07:49) (70 - 82)  BP: 124/73  RR: 20 (01 Sep 2018 04:10) (19 - 20)  SpO2: 94% (31 Aug 2018 20:58) (94% - 100%) O2 4L nasal canula          General: Well nourished/Well developed, NAD  Head:  Normocephalic, atraumatic  ENT:  Mucosa moist, no ulcerations  Neck:  Supple, no sinuses or palpable masses. Permacath on left neck, tender to palpation but no secretion, warmness noted  Respiratory: Expiratory bilateral mild crackles. Rest CTA B/L  CV: RRR, S1S2, no murmur  Back: cehst tube noted on right back at level of midline axilary line. No secretions noted  Abdominal: Soft, NT, ND no palpable mass  Extremities: No edema, + peripheral pulses, FROM all 4 extremity  Neurology: A&Ox3, no focalization signs  Incisions: intact, no erythema or drainage      08-31 @ 07:01  -  09-01 @ 07:00  --------------------------------------------------------  IN: 720 mL / OUT: 450 mL / NET: 270 mL                            10.1   3.6   )-----------( 208      ( 01 Sep 2018 06:59 )             33.4     09-01    132<L>  |  92<L>  |  33.0<H>  ----------------------------<  154<H>  5.0   |  27.0  |  4.13<H>    Ca    8.1<L>      01 Sep 2018 06:59  Phos  4.1     08-31  Mg     2.1     08-31             MEDICATIONS  (STANDING):  aspirin enteric coated 81 milliGRAM(s) Oral daily  azithromycin  IVPB      azithromycin  IVPB 500 milliGRAM(s) IV Intermittent every 24 hours  carvedilol 12.5 milliGRAM(s) Oral every 12 hours  cefTRIAXone   IVPB 1 Gram(s) IV Intermittent every 24 hours  cefTRIAXone   IVPB      chlorhexidine 2% Cloths 1 Application(s) Topical <User Schedule>  dextrose 5%. 1000 milliLiter(s) (50 mL/Hr) IV Continuous <Continuous>  dextrose 50% Injectable 12.5 Gram(s) IV Push once  dextrose 50% Injectable 25 Gram(s) IV Push once  dextrose 50% Injectable 25 Gram(s) IV Push once  docusate sodium 100 milliGRAM(s) Oral two times a day  ferrous    sulfate 325 milliGRAM(s) Oral daily  heparin  Injectable 5000 Unit(s) SubCutaneous every 12 hours  insulin lispro (HumaLOG) corrective regimen sliding scale   SubCutaneous three times a day before meals  insulin lispro (HumaLOG) corrective regimen sliding scale   SubCutaneous at bedtime  piperacillin/tazobactam IVPB. 3.375 Gram(s) IV Intermittent every 12 hours  sacubitril 24 mG/valsartan 26 mG 1 Tablet(s) Oral two times a day    MEDICATIONS  (PRN):  acetaminophen   Tablet 650 milliGRAM(s) Oral every 6 hours PRN For Temp greater than 38 C (100.4 F)  acetaminophen   Tablet. 650 milliGRAM(s) Oral every 6 hours PRN Mild Pain (1 - 3)  dextrose 40% Gel 15 Gram(s) Oral once PRN Blood Glucose LESS THAN 70 milliGRAM(s)/deciliter  glucagon  Injectable 1 milliGRAM(s) IntraMuscular once PRN Glucose LESS THAN 70 milligrams/deciliter  morphine  - Injectable 2 milliGRAM(s) IV Push every 4 hours PRN Severe Pain (7 - 10)  oxyCODONE    5 mG/acetaminophen 325 mG 2 Tablet(s) Oral every 4 hours PRN Moderate Pain (4 - 6)  senna 2 Tablet(s) Oral at bedtime PRN Constipation      Assessment- Rapid Response called for 58y year old Female with a past medical history of ESRD on HD MWF, afib not on anticoagulation given history of GIB, chronic diastolic CHF who presented to the ER with complaints of worsening shortness of breath. Code sepsis called due to WBC 3.6 and temp 3.6 concerning for pneumonia vs otgher sopurse.     Plan-  Patient currently on azythro and Rocephin. Will broad coverage to zosyn and Vancomycin, concerning for HCAP since patient still has fever and has been on HD. Will give Abx after dyalisis as per discussion with attending  EICU initially called, Dr Mary Villalta, who was soon after taken to another rapid response. She stated to recall if neede. As per attending, Cameron not need ICU for now  EICU nurse at bedside  Follow up BCx x2, UCx, lactate.   CXray done, seems less congested compared but will wait for official report  Case discussed with Courtney BAZZI and attending, Dr Barnes  Will consul ID  Patient to go to HD as per attending  Nicholefed with nursing staff at bedside. Concerns addressed  Will follow in 2 hrs Code sepsis PGY 2 Note    Patient is a 58y old  Female with PMH of ESRD on HD MWF, afib not on anticoagulation given history of GIB, chronic diastolic CHF who presented to the ER with complaints of worsening shortness of breath. CT on admission consistent with large right pleural effusion now status post pigtail placement by CTS.    Code sepsis called because rectal temp 102.2 and WBC 3.6    Patient was seen and examined at the bedside by the rapid response team.    Allergies    No Known Allergies    Intolerances    PAST MEDICAL & SURGICAL HISTORY:  Pleural effusion  Pericardial effusion  End stage renal disease on dialysis  HLD (hyperlipidemia)  HTN (hypertension)  DM (diabetes mellitus)  A-V fistula  Encounter for dialysis catheter care      Vital Signs Last 24 Hrs  T(F): 102.2 (01 Sep 2018 07:49), Max: 102.2 (01 Sep 2018 07:49)  HR: 77 (01 Sep 2018 07:49) (70 - 82)  BP: 124/73  RR: 20 (01 Sep 2018 04:10) (19 - 20)  SpO2: 94% (31 Aug 2018 20:58) (94% - 100%) O2 4L nasal canula          General: Well nourished/Well developed, NAD  Head:  Normocephalic, atraumatic  ENT:  Mucosa moist, no ulcerations  Neck:  Supple, no sinuses or palpable masses. Permacath on left neck, tender to palpation but no secretion, warmness noted  Respiratory: Expiratory bilateral mild crackles. Rest CTA B/L  CV: RRR, S1S2, no murmur  Back: cehst tube noted on right back at level of midline axilary line. No secretions noted  Abdominal: Soft, NT, ND no palpable mass  Extremities: No edema, + peripheral pulses, FROM all 4 extremity  Neurology: A&Ox3, no focalization signs  Incisions: intact, no erythema or drainage      08-31 @ 07:01  -  09-01 @ 07:00  --------------------------------------------------------  IN: 720 mL / OUT: 450 mL / NET: 270 mL                            10.1   3.6   )-----------( 208      ( 01 Sep 2018 06:59 )             33.4     09-01    132<L>  |  92<L>  |  33.0<H>  ----------------------------<  154<H>  5.0   |  27.0  |  4.13<H>    Ca    8.1<L>      01 Sep 2018 06:59  Phos  4.1     08-31  Mg     2.1     08-31             MEDICATIONS  (STANDING):  aspirin enteric coated 81 milliGRAM(s) Oral daily  azithromycin  IVPB      azithromycin  IVPB 500 milliGRAM(s) IV Intermittent every 24 hours  carvedilol 12.5 milliGRAM(s) Oral every 12 hours  cefTRIAXone   IVPB 1 Gram(s) IV Intermittent every 24 hours  cefTRIAXone   IVPB      chlorhexidine 2% Cloths 1 Application(s) Topical <User Schedule>  dextrose 5%. 1000 milliLiter(s) (50 mL/Hr) IV Continuous <Continuous>  dextrose 50% Injectable 12.5 Gram(s) IV Push once  dextrose 50% Injectable 25 Gram(s) IV Push once  dextrose 50% Injectable 25 Gram(s) IV Push once  docusate sodium 100 milliGRAM(s) Oral two times a day  ferrous    sulfate 325 milliGRAM(s) Oral daily  heparin  Injectable 5000 Unit(s) SubCutaneous every 12 hours  insulin lispro (HumaLOG) corrective regimen sliding scale   SubCutaneous three times a day before meals  insulin lispro (HumaLOG) corrective regimen sliding scale   SubCutaneous at bedtime  piperacillin/tazobactam IVPB. 3.375 Gram(s) IV Intermittent every 12 hours  sacubitril 24 mG/valsartan 26 mG 1 Tablet(s) Oral two times a day    MEDICATIONS  (PRN):  acetaminophen   Tablet 650 milliGRAM(s) Oral every 6 hours PRN For Temp greater than 38 C (100.4 F)  acetaminophen   Tablet. 650 milliGRAM(s) Oral every 6 hours PRN Mild Pain (1 - 3)  dextrose 40% Gel 15 Gram(s) Oral once PRN Blood Glucose LESS THAN 70 milliGRAM(s)/deciliter  glucagon  Injectable 1 milliGRAM(s) IntraMuscular once PRN Glucose LESS THAN 70 milligrams/deciliter  morphine  - Injectable 2 milliGRAM(s) IV Push every 4 hours PRN Severe Pain (7 - 10)  oxyCODONE    5 mG/acetaminophen 325 mG 2 Tablet(s) Oral every 4 hours PRN Moderate Pain (4 - 6)  senna 2 Tablet(s) Oral at bedtime PRN Constipation      Assessment- Rapid Response called for 58y year old Female with a past medical history of ESRD on HD MWF, afib not on anticoagulation given history of GIB, chronic diastolic CHF who presented to the ER with complaints of worsening shortness of breath. Code sepsis called due to WBC 3.6 and temp 3.6 concerning for pneumonia vs otgher sopurse.     Plan-  Patient currently on azythro and Rocephin. Will broad coverage to zosyn and Vancomycin, concerning for HCAP since patient still has fever and has been on HD. Will give Abx after dyalisis as per discussion with attending  EICU initially called, Dr Mary Villalta, who was soon after taken to another rapid response. She stated to recall if neede. As per attending, Cameron not need ICU for now  EICU nurse at bedside  Follow up BCx x2, UCx, lactate. No CBC ordered since patient just had it resulted.   Will hold fluids for now as per attending request. Patient was fluid overload during admission.   CXray done, seems less congested compared but will wait for official report  Case discussed with Courtney BAZZI and attending, Dr Barnes  Will consul ID  Patient to go to HD as per attending  Lilian with nursing staff at bedside. Concerns addressed  Will follow in 2 hrs

## 2018-09-01 NOTE — CONSULT NOTE ADULT - CONSULT REASON
PLEURAL EFFUSION
-Question: access for pre-existing LUE AV Graft for dialysis
CHF Exacerbation
pleural effusion
CRF

## 2018-09-01 NOTE — PROGRESS NOTE ADULT - SUBJECTIVE AND OBJECTIVE BOX
CC: Fever    INTERVAL HPI/OVERNIGHT EVENTS: Patient seen and examined, code sepsis called this morning for a rectal temperature of 102F. Noted to have increased o2 requirement overnight.       Vital Signs Last 24 Hrs  T(C): 36.9 (01 Sep 2018 10:25), Max: 39 (01 Sep 2018 07:49)  T(F): 98.4 (01 Sep 2018 10:25), Max: 102.2 (01 Sep 2018 07:49)  HR: 76 (01 Sep 2018 10:25) (75 - 82)  BP: 113/68 (01 Sep 2018 10:25) (113/68 - 127/75)  BP(mean): --  RR: 18 (01 Sep 2018 10:25) (18 - 20)  SpO2: 93% (01 Sep 2018 10:25) (93% - 100%)    PHYSICAL EXAM:    GENERAL: NAD, AOX3  HEAD:  Atraumatic, Normocephalic  EYES: EOMI, PERRLA, conjunctiva and sclera clear  ENMT: Moist mucous membranes  CHEST/LUNG: bibasilar crackles   Right chest tube  HEART: Regular rate and rhythm; No murmurs, rubs, or gallops  ABDOMEN: Soft, Nontender, Nondistended; Bowel sounds present  EXTREMITIES:  2+ Peripheral Pulses, No clubbing, cyanosis, or edema        MEDICATIONS  (STANDING):  aspirin enteric coated 81 milliGRAM(s) Oral daily  carvedilol 12.5 milliGRAM(s) Oral every 12 hours  chlorhexidine 2% Cloths 1 Application(s) Topical <User Schedule>  dextrose 5%. 1000 milliLiter(s) (50 mL/Hr) IV Continuous <Continuous>  dextrose 50% Injectable 12.5 Gram(s) IV Push once  dextrose 50% Injectable 25 Gram(s) IV Push once  dextrose 50% Injectable 25 Gram(s) IV Push once  docusate sodium 100 milliGRAM(s) Oral two times a day  ferrous    sulfate 325 milliGRAM(s) Oral daily  heparin  Injectable 5000 Unit(s) SubCutaneous every 12 hours  insulin lispro (HumaLOG) corrective regimen sliding scale   SubCutaneous three times a day before meals  insulin lispro (HumaLOG) corrective regimen sliding scale   SubCutaneous at bedtime  piperacillin/tazobactam IVPB. 2.25 Gram(s) IV Intermittent every 12 hours  sacubitril 24 mG/valsartan 26 mG 1 Tablet(s) Oral two times a day  vancomycin  IVPB 1000 milliGRAM(s) IV Intermittent once    MEDICATIONS  (PRN):  acetaminophen   Tablet 650 milliGRAM(s) Oral every 6 hours PRN For Temp greater than 38 C (100.4 F)  acetaminophen   Tablet. 650 milliGRAM(s) Oral every 6 hours PRN Mild Pain (1 - 3)  dextrose 40% Gel 15 Gram(s) Oral once PRN Blood Glucose LESS THAN 70 milliGRAM(s)/deciliter  glucagon  Injectable 1 milliGRAM(s) IntraMuscular once PRN Glucose LESS THAN 70 milligrams/deciliter  morphine  - Injectable 2 milliGRAM(s) IV Push every 4 hours PRN Severe Pain (7 - 10)  oxyCODONE    5 mG/acetaminophen 325 mG 2 Tablet(s) Oral every 4 hours PRN Moderate Pain (4 - 6)  senna 2 Tablet(s) Oral at bedtime PRN Constipation      Allergies    No Known Allergies    Intolerances          LABS:                          10.1   3.6   )-----------( 208      ( 01 Sep 2018 06:59 )             33.4     09-    133<L>  |  90<L>  |  20.0  ----------------------------<  93  5.0   |  15.0<L>  |  2.13<H>    Ca    4.3<LL>      01 Sep 2018 08:39  Phos  1.8       Mg     2.1     08-31    TPro  3.2<L>  /  Alb  1.6<L>  /  TBili  0.2<L>  /  DBili  x   /  AST  11  /  ALT  <5  /  AlkPhos  43        Urinalysis Basic - ( 01 Sep 2018 10:13 )    Color: Yellow / Appearance: Slightly Turbid / S.010 / pH: x  Gluc: x / Ketone: Negative  / Bili: Negative / Urobili: Negative mg/dL   Blood: x / Protein: 500 mg/dL / Nitrite: Negative   Leuk Esterase: Moderate / RBC: >50 /HPF / WBC >50   Sq Epi: x / Non Sq Epi: Moderate / Bacteria: Moderate        RADIOLOGY & ADDITIONAL TESTS: CC: Fever    INTERVAL HPI/OVERNIGHT EVENTS: Patient seen and examined, code sepsis called this morning for a rectal temperature of 102F. Noted to have increased o2 requirement overnight. Seen during HD. Denies cough, sob chest pain or palpitations. Denies urinary or bowel complaints. Denies abdominal pain, nausea or vomiting.       Vital Signs Last 24 Hrs  T(C): 36.9 (01 Sep 2018 10:25), Max: 39 (01 Sep 2018 07:49)  T(F): 98.4 (01 Sep 2018 10:25), Max: 102.2 (01 Sep 2018 07:49)  HR: 76 (01 Sep 2018 10:25) (75 - 82)  BP: 113/68 (01 Sep 2018 10:25) (113/68 - 127/75)  BP(mean): --  RR: 18 (01 Sep 2018 10:25) (18 - 20)  SpO2: 93% (01 Sep 2018 10:25) (93% - 100%)    PHYSICAL EXAM:    GENERAL: NAD, AOX3  HEAD:  Atraumatic, Normocephalic  EYES: EOMI, PERRLA, conjunctiva and sclera clear  ENMT: Moist mucous membranes  CHEST/LUNG: bibasilar crackles   Right chest tube  HEART: Regular rate and rhythm; No murmurs, rubs, or gallops  ABDOMEN: Soft, Nontender, Nondistended; Bowel sounds present  EXTREMITIES:  2+ Peripheral Pulses, No clubbing, cyanosis, or edema        MEDICATIONS  (STANDING):  aspirin enteric coated 81 milliGRAM(s) Oral daily  carvedilol 12.5 milliGRAM(s) Oral every 12 hours  chlorhexidine 2% Cloths 1 Application(s) Topical <User Schedule>  dextrose 5%. 1000 milliLiter(s) (50 mL/Hr) IV Continuous <Continuous>  dextrose 50% Injectable 12.5 Gram(s) IV Push once  dextrose 50% Injectable 25 Gram(s) IV Push once  dextrose 50% Injectable 25 Gram(s) IV Push once  docusate sodium 100 milliGRAM(s) Oral two times a day  ferrous    sulfate 325 milliGRAM(s) Oral daily  heparin  Injectable 5000 Unit(s) SubCutaneous every 12 hours  insulin lispro (HumaLOG) corrective regimen sliding scale   SubCutaneous three times a day before meals  insulin lispro (HumaLOG) corrective regimen sliding scale   SubCutaneous at bedtime  piperacillin/tazobactam IVPB. 2.25 Gram(s) IV Intermittent every 12 hours  sacubitril 24 mG/valsartan 26 mG 1 Tablet(s) Oral two times a day  vancomycin  IVPB 1000 milliGRAM(s) IV Intermittent once    MEDICATIONS  (PRN):  acetaminophen   Tablet 650 milliGRAM(s) Oral every 6 hours PRN For Temp greater than 38 C (100.4 F)  acetaminophen   Tablet. 650 milliGRAM(s) Oral every 6 hours PRN Mild Pain (1 - 3)  dextrose 40% Gel 15 Gram(s) Oral once PRN Blood Glucose LESS THAN 70 milliGRAM(s)/deciliter  glucagon  Injectable 1 milliGRAM(s) IntraMuscular once PRN Glucose LESS THAN 70 milligrams/deciliter  morphine  - Injectable 2 milliGRAM(s) IV Push every 4 hours PRN Severe Pain (7 - 10)  oxyCODONE    5 mG/acetaminophen 325 mG 2 Tablet(s) Oral every 4 hours PRN Moderate Pain (4 - 6)  senna 2 Tablet(s) Oral at bedtime PRN Constipation      Allergies    No Known Allergies    Intolerances          LABS:                          10.1   3.6   )-----------( 208      ( 01 Sep 2018 06:59 )             33.4     09-    133<L>  |  90<L>  |  20.0  ----------------------------<  93  5.0   |  15.0<L>  |  2.13<H>    Ca    4.3<LL>      01 Sep 2018 08:39  Phos  1.8       Mg     2.1     08-    TPro  3.2<L>  /  Alb  1.6<L>  /  TBili  0.2<L>  /  DBili  x   /  AST  11  /  ALT  <5  /  AlkPhos  43        Urinalysis Basic - ( 01 Sep 2018 10:13 )    Color: Yellow / Appearance: Slightly Turbid / S.010 / pH: x  Gluc: x / Ketone: Negative  / Bili: Negative / Urobili: Negative mg/dL   Blood: x / Protein: 500 mg/dL / Nitrite: Negative   Leuk Esterase: Moderate / RBC: >50 /HPF / WBC >50   Sq Epi: x / Non Sq Epi: Moderate / Bacteria: Moderate        RADIOLOGY & ADDITIONAL TESTS:

## 2018-09-01 NOTE — PROGRESS NOTE ADULT - SUBJECTIVE AND OBJECTIVE BOX
Subjective: Pt in bed NAD     T(C): 36.8 (09-01-18 @ 16:17), Max: 39 (09-01-18 @ 07:49)  HR: 84 (09-01-18 @ 16:17) (75 - 84)  BP: 160/89 (09-01-18 @ 16:17) (113/68 - 160/89)    RR: 17 (09-01-18 @ 14:10) (17 - 20)  SpO2: 95% (09-01-18 @ 16:17) (93% - 100%)  Tele: SR     CHEST TUBE:     Rt                          OUTPUT:  450    per 24 hours    AIR LEAKS:  [ ] YES [x ] NO          09-01    133<L>  |  90<L>  |  20.0  ----------------------------<  93  5.0   |  15.0<L>  |  2.13<H>    Ca    4.3<LL>      01 Sep 2018 08:39  Phos  1.8     09-01  Mg     2.1     08-31    TPro  3.2<L>  /  Alb  1.6<L>  /  TBili  0.2<L>  /  DBili  x   /  AST  11  /  ALT  <5  /  AlkPhos  43  09-01                               10.1   3.6   )-----------( 208      ( 01 Sep 2018 06:59 )             33.4                 CAPILLARY BLOOD GLUCOSE      POCT Blood Glucose.: 139 mg/dL (01 Sep 2018 12:06)  POCT Blood Glucose.: 135 mg/dL (01 Sep 2018 07:55)  POCT Blood Glucose.: 146 mg/dL (31 Aug 2018 21:04)  POCT Blood Glucose.: 210 mg/dL (31 Aug 2018 17:21)           CXR: < from: Xray Chest 1 View-PORTABLE IMMEDIATE (09.01.18 @ 08:29) >  AP radiograph of the chest demonstrates worsening RIGHT upper lobe   infiltrate. Unchanged change BILATERAL infiltrates with small pleural   effusions. RIGHT chest tube unchanged. The cardiac silhouette is normal   in size. Osseous structures are intact. RIGHT central catheter is   unchanged.    Impression:worsening RIGHT upper lobe infiltrate. Unchanged change   BILATERAL infiltrates with small pleural effusions. RIGHT chest tube   unchanged    < end of copied text >          Assessment  Neuro:  alert awake  Pulm:  decreased Rt base   CV: RRR S1 S2   Abd:  soft NT ND + BS   Extremities: trace edema b/l LE        Assessment:  58yFemale    with PAST MEDICAL & SURGICAL HISTORY:  Pleural effusion  Pericardial effusion  End stage renal disease on dialysis  HLD (hyperlipidemia)  HTN (hypertension)  DM (diabetes mellitus)  A-V fistula  Encounter for dialysis catheter care        Plan:

## 2018-09-01 NOTE — PROGRESS NOTE ADULT - SUBJECTIVE AND OBJECTIVE BOX
INTERVAL HPI/OVERNIGHT EVENTS: None    SUBJECTIVE:  Patient is 58 year old female with ESRD on HD. No issues overnight. Patient is going for HD today and is to use the new LUE AV graft.  Left palpable thrill +3, strong radial pulse bilaterally.      MEDICATIONS  (STANDING):  aspirin enteric coated 81 milliGRAM(s) Oral daily  calcitriol   Capsule 0.25 MICROGram(s) Oral two times a day  calcium carbonate   1250 mG (OsCal) 1 Tablet(s) Oral three times a day  calcium gluconate IVPB 1 Gram(s) IV Intermittent once  carvedilol 12.5 milliGRAM(s) Oral every 12 hours  chlorhexidine 2% Cloths 1 Application(s) Topical <User Schedule>  dextrose 5%. 1000 milliLiter(s) (50 mL/Hr) IV Continuous <Continuous>  dextrose 50% Injectable 12.5 Gram(s) IV Push once  dextrose 50% Injectable 25 Gram(s) IV Push once  dextrose 50% Injectable 25 Gram(s) IV Push once  docusate sodium 100 milliGRAM(s) Oral two times a day  ferrous    sulfate 325 milliGRAM(s) Oral daily  heparin  Injectable 5000 Unit(s) SubCutaneous every 12 hours  insulin lispro (HumaLOG) corrective regimen sliding scale   SubCutaneous three times a day before meals  insulin lispro (HumaLOG) corrective regimen sliding scale   SubCutaneous at bedtime  piperacillin/tazobactam IVPB. 2.25 Gram(s) IV Intermittent every 12 hours  sacubitril 24 mG/valsartan 26 mG 1 Tablet(s) Oral two times a day  sodium chloride 0.9% 1000 milliLiter(s) (43 mL/Hr) IV Continuous <Continuous>  vancomycin  IVPB 1000 milliGRAM(s) IV Intermittent once    MEDICATIONS  (PRN):  acetaminophen   Tablet 650 milliGRAM(s) Oral every 6 hours PRN For Temp greater than 38 C (100.4 F)  acetaminophen   Tablet. 650 milliGRAM(s) Oral every 6 hours PRN Mild Pain (1 - 3)  dextrose 40% Gel 15 Gram(s) Oral once PRN Blood Glucose LESS THAN 70 milliGRAM(s)/deciliter  glucagon  Injectable 1 milliGRAM(s) IntraMuscular once PRN Glucose LESS THAN 70 milligrams/deciliter  morphine  - Injectable 2 milliGRAM(s) IV Push every 4 hours PRN Severe Pain (7 - 10)  oxyCODONE    5 mG/acetaminophen 325 mG 2 Tablet(s) Oral every 4 hours PRN Moderate Pain (4 - 6)  senna 2 Tablet(s) Oral at bedtime PRN Constipation      Vital Signs Last 24 Hrs  T(C): 36.9 (01 Sep 2018 10:25), Max: 39 (01 Sep 2018 07:49)  T(F): 98.4 (01 Sep 2018 10:25), Max: 102.2 (01 Sep 2018 07:49)  HR: 76 (01 Sep 2018 10:25) (75 - 82)  BP: 113/68 (01 Sep 2018 10:25) (113/68 - 125/75)  BP(mean): --  RR: 18 (01 Sep 2018 10:25) (18 - 20)  SpO2: 93% (01 Sep 2018 10:25) (93% - 100%)    PE  Gen: well-appearing, no acute distress  CV: RRR nontachy. R chest permacath in place, in signs of infection   Abd: soft NTND  Ext: WWP. LUE. Palpable strong thrill AV graft. Graft appears superficial, close to skin. l radial pulse 1+ palpable. Full motor function of hand. No numbness bt pt c/o subjective pins/needles to all fingertips since time of operation      I&O's Detail    31 Aug 2018 07:  -  01 Sep 2018 07:00  --------------------------------------------------------  IN:    Oral Fluid: 720 mL  Total IN: 720 mL    OUT:    Chest Tube: 450 mL  Total OUT: 450 mL    Total NET: 270 mL      01 Sep 2018 07:  -  01 Sep 2018 14:28  --------------------------------------------------------  IN:    Oral Fluid: 720 mL  Total IN: 720 mL    OUT:  Total OUT: 0 mL    Total NET: 720 mL          LABS:                        10.1   3.6   )-----------( 208      ( 01 Sep 2018 06:59 )             33.4     09-    133<L>  |  90<L>  |  20.0  ----------------------------<  93  5.0   |  15.0<L>  |  2.13<H>    Ca    4.3<LL>      01 Sep 2018 08:39  Phos  1.8       Mg     2.1         TPro  3.2<L>  /  Alb  1.6<L>  /  TBili  0.2<L>  /  DBili  x   /  AST  11  /  ALT  <5  /  AlkPhos  43        Urinalysis Basic - ( 01 Sep 2018 10:13 )    Color: Yellow / Appearance: Slightly Turbid / S.010 / pH: x  Gluc: x / Ketone: Negative  / Bili: Negative / Urobili: Negative mg/dL   Blood: x / Protein: 500 mg/dL / Nitrite: Negative   Leuk Esterase: Moderate / RBC: >50 /HPF / WBC >50   Sq Epi: x / Non Sq Epi: Moderate / Bacteria: Moderate        RADIOLOGY & ADDITIONAL STUDIES: INTERVAL HPI/OVERNIGHT EVENTS: None    SUBJECTIVE:  Patient is 58 year old female with ESRD on HD. No issues overnight. Patient is going for HD today and is to use the LUE AV graft.  Left palpable thrill +3, strong radial pulse bilaterally.      MEDICATIONS  (STANDING):  aspirin enteric coated 81 milliGRAM(s) Oral daily  calcitriol   Capsule 0.25 MICROGram(s) Oral two times a day  calcium carbonate   1250 mG (OsCal) 1 Tablet(s) Oral three times a day  calcium gluconate IVPB 1 Gram(s) IV Intermittent once  carvedilol 12.5 milliGRAM(s) Oral every 12 hours  chlorhexidine 2% Cloths 1 Application(s) Topical <User Schedule>  dextrose 5%. 1000 milliLiter(s) (50 mL/Hr) IV Continuous <Continuous>  dextrose 50% Injectable 12.5 Gram(s) IV Push once  dextrose 50% Injectable 25 Gram(s) IV Push once  dextrose 50% Injectable 25 Gram(s) IV Push once  docusate sodium 100 milliGRAM(s) Oral two times a day  ferrous    sulfate 325 milliGRAM(s) Oral daily  heparin  Injectable 5000 Unit(s) SubCutaneous every 12 hours  insulin lispro (HumaLOG) corrective regimen sliding scale   SubCutaneous three times a day before meals  insulin lispro (HumaLOG) corrective regimen sliding scale   SubCutaneous at bedtime  piperacillin/tazobactam IVPB. 2.25 Gram(s) IV Intermittent every 12 hours  sacubitril 24 mG/valsartan 26 mG 1 Tablet(s) Oral two times a day  sodium chloride 0.9% 1000 milliLiter(s) (43 mL/Hr) IV Continuous <Continuous>  vancomycin  IVPB 1000 milliGRAM(s) IV Intermittent once    MEDICATIONS  (PRN):  acetaminophen   Tablet 650 milliGRAM(s) Oral every 6 hours PRN For Temp greater than 38 C (100.4 F)  acetaminophen   Tablet. 650 milliGRAM(s) Oral every 6 hours PRN Mild Pain (1 - 3)  dextrose 40% Gel 15 Gram(s) Oral once PRN Blood Glucose LESS THAN 70 milliGRAM(s)/deciliter  glucagon  Injectable 1 milliGRAM(s) IntraMuscular once PRN Glucose LESS THAN 70 milligrams/deciliter  morphine  - Injectable 2 milliGRAM(s) IV Push every 4 hours PRN Severe Pain (7 - 10)  oxyCODONE    5 mG/acetaminophen 325 mG 2 Tablet(s) Oral every 4 hours PRN Moderate Pain (4 - 6)  senna 2 Tablet(s) Oral at bedtime PRN Constipation      Vital Signs Last 24 Hrs  T(C): 36.9 (01 Sep 2018 10:25), Max: 39 (01 Sep 2018 07:49)  T(F): 98.4 (01 Sep 2018 10:25), Max: 102.2 (01 Sep 2018 07:49)  HR: 76 (01 Sep 2018 10:25) (75 - 82)  BP: 113/68 (01 Sep 2018 10:25) (113/68 - 125/75)  BP(mean): --  RR: 18 (01 Sep 2018 10:25) (18 - 20)  SpO2: 93% (01 Sep 2018 10:25) (93% - 100%)    PE  Gen: well-appearing, no acute distress  CV: RRR nontachy. R chest permacath in place, in signs of infection   Abd: soft NTND  Ext: WWP. LUE. Palpable strong thrill AV graft. Graft appears superficial, close to skin. l radial pulse 1+ palpable. Full motor function of hand. No numbness bt pt c/o subjective pins/needles to all fingertips since time of operation      I&O's Detail    31 Aug 2018 07:  -  01 Sep 2018 07:00  --------------------------------------------------------  IN:    Oral Fluid: 720 mL  Total IN: 720 mL    OUT:    Chest Tube: 450 mL  Total OUT: 450 mL    Total NET: 270 mL      01 Sep 2018 07:  -  01 Sep 2018 14:28  --------------------------------------------------------  IN:    Oral Fluid: 720 mL  Total IN: 720 mL    OUT:  Total OUT: 0 mL    Total NET: 720 mL          LABS:                        10.1   3.6   )-----------( 208      ( 01 Sep 2018 06:59 )             33.4     09    133<L>  |  90<L>  |  20.0  ----------------------------<  93  5.0   |  15.0<L>  |  2.13<H>    Ca    4.3<LL>      01 Sep 2018 08:39  Phos  1.8       Mg     2.1         TPro  3.2<L>  /  Alb  1.6<L>  /  TBili  0.2<L>  /  DBili  x   /  AST  11  /  ALT  <5  /  AlkPhos  43        Urinalysis Basic - ( 01 Sep 2018 10:13 )    Color: Yellow / Appearance: Slightly Turbid / S.010 / pH: x  Gluc: x / Ketone: Negative  / Bili: Negative / Urobili: Negative mg/dL   Blood: x / Protein: 500 mg/dL / Nitrite: Negative   Leuk Esterase: Moderate / RBC: >50 /HPF / WBC >50   Sq Epi: x / Non Sq Epi: Moderate / Bacteria: Moderate        RADIOLOGY & ADDITIONAL STUDIES:

## 2018-09-01 NOTE — CONSULT NOTE ADULT - SUBJECTIVE AND OBJECTIVE BOX
Patient is a 58y old  Female who presents with a chief complaint of Shortness of breath (30 Aug 2018 10:33)       HPI:  58 year old female esrd on hd, history of pericardial effusion, dm, a fib not on anticoagulation secondary to gi bleed, anemia, Pubic rami fracture, anemia. Patient was in her usual state of poor health when she became severely short of breath in hemo-dialysis. She was sent to the er where she was found to have a large pleural effusion and a chest tube was placed and the fluid was sent for blood culture. The chest drained originally, then was clamped, and on 8/30 unclamped and continues to drain. She is currently lying in bed, appears ill. Ct surgery input appreciated, cardiology consulted, add antibiotics and assess blood cultures. (30 Aug 2018 10:33)      PAST MEDICAL & SURGICAL HISTORY:  Pleural effusion  Pericardial effusion  End stage renal disease on dialysis  HLD (hyperlipidemia)  HTN (hypertension)  DM (diabetes mellitus)  A-V fistula  Encounter for dialysis catheter care      FAMILY HISTORY:  No pertinent family history in first degree relatives  [place]    Social History:Non smoker    MEDICATIONS  (STANDING):  aspirin enteric coated 81 milliGRAM(s) Oral daily  azithromycin  IVPB      azithromycin  IVPB 500 milliGRAM(s) IV Intermittent once  carvedilol 12.5 milliGRAM(s) Oral every 12 hours  cefTRIAXone   IVPB 1 Gram(s) IV Intermittent once  cefTRIAXone   IVPB      colchicine 0.6 milliGRAM(s) Oral daily  dextrose 5%. 1000 milliLiter(s) (50 mL/Hr) IV Continuous <Continuous>  dextrose 50% Injectable 12.5 Gram(s) IV Push once  dextrose 50% Injectable 25 Gram(s) IV Push once  dextrose 50% Injectable 25 Gram(s) IV Push once  docusate sodium 100 milliGRAM(s) Oral two times a day  ferrous    sulfate 325 milliGRAM(s) Oral daily  heparin  Injectable 5000 Unit(s) SubCutaneous every 12 hours  insulin lispro (HumaLOG) corrective regimen sliding scale   SubCutaneous three times a day before meals  insulin lispro (HumaLOG) corrective regimen sliding scale   SubCutaneous at bedtime    MEDICATIONS  (PRN):  dextrose 40% Gel 15 Gram(s) Oral once PRN Blood Glucose LESS THAN 70 milliGRAM(s)/deciliter  glucagon  Injectable 1 milliGRAM(s) IntraMuscular once PRN Glucose LESS THAN 70 milligrams/deciliter  senna 2 Tablet(s) Oral at bedtime PRN Constipation   Meds reviewed    Allergies    No Known Allergies        REVIEW OF SYSTEMS:    CONSTITUTIONAL:  sob, feels weak  EYES: No eye pain, visual disturbances, or discharge  ENMT:  No difficulty hearing, tinnitus, vertigo; No sinus or throat pain  NECK: No pain or stiffness  BREASTS: No pain, masses, or nipple discharge  RESPIRATORY: sob  CARDIOVASCULAR: No chest pain, palpitations, dizziness,   GASTROINTESTINAL: No abdominal or epigastric pain. No nausea, vomiting, or hematemesis; No diarrhea or constipation.   GENITOURINARY: No dysuria, frequency, hematuria, or incontinence  NEUROLOGICAL: No headaches, memory loss, loss of strength  Neuropathy feet   SKIN: Neg  LYMPH NODES: No enlarged glands  ENDOCRINE: No heat or cold intolerance; No hair loss  MUSCULOSKELETAL: Chronic muscle wasting  PSYCHIATRIC: No depression, anxiety, mood swings, or difficulty sleeping  HEME/LYMPH: No easy bruising, or bleeding gums  ALLERGY AND IMMUNOLOGIC: No hives or eczema      Vital Signs Last 24 Hrs  T(C): 36.9 (30 Aug 2018 08:07), Max: 37.1 (30 Aug 2018 03:25)  T(F): 98.4 (30 Aug 2018 08:07), Max: 98.7 (30 Aug 2018 03:25)  HR: 87 (30 Aug 2018 08:07) (81 - 95)  BP: 164/99 (30 Aug 2018 08:07) (152/83 - 164/99)  BP(mean): --  RR: 20 (30 Aug 2018 08:07) (20 - 22)  SpO2: 96% (30 Aug 2018 06:04) (92% - 96%)  Daily Height in cm: 165.1 (29 Aug 2018 22:29)        PHYSICAL EXAM:    GENERAL: appears acutely and chronically ill, oriented.  HEAD:  Atraumatic, Normocephalic  EYES: EOMI, PERRLA, conjunctiva and sclera clear  ENMT: No tonsillar erythema, exudates, or enlargement; Moist mucous membranes, Good dentition, No lesions  NECK: Supple, neck  veins full, right permacath  NERVOUS SYSTEM:  Alert & Oriented X3, Good concentration; Motor Strength wnl upper and lower extremities;  CHEST/LUNG:  Right chest tube, bilateral rhonchi  HEART:IRRegular rate and rhythm; No  rubs, or gallops  ABDOMEN: Soft, Nontender, Nondistended; Bowel sounds present,  EXTREMITIES:  Diffuse muscle wasting  LYMPH: No lymphadenopathy noted  SKIN: No rashes or lesions, pale   neg    LABS:                        10.3   5.2   )-----------( 171      ( 30 Aug 2018 00:34 )             33.7     08-30    138  |  97<L>  |  18.0  ----------------------------<  183<H>  4.1   |  26.0  |  2.85<H>    Ca    8.4<L>      30 Aug 2018 00:34    TPro  7.1  /  Alb  3.4  /  TBili  0.6  /  DBili  x   /  AST  22  /  ALT  9   /  AlkPhos  110  08-30    PT/INR - ( 30 Aug 2018 00:34 )   PT: 13.5 sec;   INR: 1.22 ratio         PTT - ( 30 Aug 2018 00:34 )  PTT:29.2 sec            RADIOLOGY & ADDITIONAL TESTS:
[facility] [place] JORGE VELA MD FACP   JASEN LUIS MD  Diplomates American Board of Internal Medicine and Infecctious Diseases  [phone]  [id number] ADELITA [id number]      HPI:  58 year old female esrd on hd, history of pericardial effusion, dm, a fib not on anticoagulation secondary to gi bleed, anemia, Pubic rami fracture, anemia. Patient was in her usual state of poor health when she became severely short of breath in hemo-dialysis. She was sent to the er where she was found to have a large pleural effusion and a chest tube was placed and the fluid was sent for blood culture.      ASKED TO EVALUATE FROM ID STANDPOINT       PAST MEDICAL & SURGICAL HISTORY:  Pleural effusion  Pericardial effusion  End stage renal disease on dialysis  HLD (hyperlipidemia)  HTN (hypertension)  DM (diabetes mellitus)  A-V fistula  Encounter for dialysis catheter care      ANTIBIOTICS  piperacillin/tazobactam IVPB. 2.25 Gram(s) IV Intermittent every 12 hours      Allergies    No Known Allergies    Intolerances        SOCIAL HISTORY:       FAMILY HX   FAMILY HISTORY:  No pertinent family history in first degree relatives      Vital Signs Last 24 Hrs  T(C): 36.8 (01 Sep 2018 16:17), Max: 39 (01 Sep 2018 07:49)  T(F): 98.3 (01 Sep 2018 16:17), Max: 102.2 (01 Sep 2018 07:49)  HR: 84 (01 Sep 2018 16:17) (75 - 84)  BP: 160/89 (01 Sep 2018 16:17) (113/68 - 160/89)  BP(mean): --  RR: 17 (01 Sep 2018 14:10) (17 - 20)  SpO2: 95% (01 Sep 2018 16:17) (93% - 100%)  Drug Dosing Weight  Height (cm): 165.1 (29 Aug 2018 22:29)  Weight (kg): 68 (29 Aug 2018 22:29)  BMI (kg/m2): 24.9 (29 Aug 2018 22:29)  BSA (m2): 1.75 (29 Aug 2018 22:29)      REVIEW OF SYSTEMS:    CONSTITUTIONAL:  As per HPI.    HEENT:  Eyes:  No diplopia or blurred vision. ENT:  No earache, sore throat or runny nose.    CARDIOVASCULAR:  No pressure, squeezing, strangling, tightness, heaviness or aching about the chest, neck, axilla or epigastrium.    RESPIRATORY:  No cough, shortness of breath, PND or orthopnea.    GASTROINTESTINAL:  No nausea, vomiting or diarrhea.    GENITOURINARY:  No dysuria, frequency or urgency.    MUSCULOSKELETAL:  As per HPI.    SKIN:  No change in skin, hair or nails.    NEUROLOGIC:  No paresthesias, fasciculations, seizures or weakness.                  PHYSICAL EXAMINATION:    GENERAL: The patient is a well-developed, IN NAD     VITAL SIGNS: T(C): 36.8 (18 @ 16:17), Max: 39 (18 @ 07:49)  HR: 84 (18 @ 16:17) (75 - 84)  BP: 160/89 (18 @ 16:17) (113/68 - 160/89)  RR: 17 (18 @ 14:10) (17 - 20)  SpO2: 95% (18 @ 16:17) (93% - 100%)  Wt(kg): --    HEENT: Head is normocephalic and atraumatic.  ANICTERIC  NECK: Supple. No carotid bruits.  No lymphadenopathy or thyromegaly.    LUNGS: COARSE BREATH SOUNDS  CHEST TUBE IN PLACE RIGHT SIDE     HEART: Regular rate and rhythm without murmur.    ABDOMEN: Soft, nontender, and nondistended.  Positive bowel sounds.  No hepatosplenomegaly was noted. NO REBOUND NO GUARDING    EXTREMITIES: NO EDEMA NO ERYTHEMA    NEUROLOGIC: NON FOCAL      SKIN: No ulceration or induration present. NO RASH        BLOOD CULTURES  Culture Results:   No growth at 48 hours ( @ 13:13)  Culture Results:   No growth at 48 hours ( @ 13:13)  Culture Results:   No growth at 2 days.  Culture in progress ( @ 03:28)       URINE CX          LABS:                        10.1   3.6   )-----------( 208      ( 01 Sep 2018 06:59 )             33.4         133<L>  |  90<L>  |  20.0  ----------------------------<  93  5.0   |  15.0<L>  |  2.13<H>    Ca    4.3<LL>      01 Sep 2018 08:39  Phos  1.8     -  Mg     2.1     08-    TPro  3.2<L>  /  Alb  1.6<L>  /  TBili  0.2<L>  /  DBili  x   /  AST  11  /  ALT  <5  /  AlkPhos  43        Urinalysis Basic - ( 01 Sep 2018 10:13 )    Color: Yellow / Appearance: Slightly Turbid / S.010 / pH: x  Gluc: x / Ketone: Negative  / Bili: Negative / Urobili: Negative mg/dL   Blood: x / Protein: 500 mg/dL / Nitrite: Negative   Leuk Esterase: Moderate / RBC: >50 /HPF / WBC >50   Sq Epi: x / Non Sq Epi: Moderate / Bacteria: Moderate        RADIOLOGY & ADDITIONAL STUDIES:      ASSESSMENT/PLAN  58 year old female esrd on hd, history of pericardial effusion, dm, a fib not on anticoagulation secondary to gi bleed, anemia, Pubic rami fracture, anemia. Patient was in her usual state of poor health when she became severely short of breath in hemo-dialysis. She was sent to the er where she was found to have a large pleural effusion and a chest tube was placed and the fluid was sent for blood culture.   PLEURAL FLUID CX NEGATIVE  WILL CHECK BLOOD CX  CONTINUE ZOSYN  FOR NOW                  JASEN THOMAS MD
CARDIOLOGY CONSULTATION NOTE   Consult requested by:  Dr. Arvizu    Reason for Consultation: CHF Exacerbation    History obtained by: Patient, family and medical record     obtained: Yes, Shayla     Chief complaint:    Patient is a 58y old  Female who presents with a chief complaint of Shortness of breath (30 Aug 2018 10:33)      HPI: 59 y/o F PMHx of NICM, HFrEF (EF 40-45%), Afib not on AC due to GI bleed, pleural effusion, pericardial effusion, ESRD MWF HD, anemia, and pubic rami fracture transferred from HD facility due to SOB. Pt states that she began experiencing a persistent, dry cough on Monday, and by Tuesday cough progressed to productive with blood tinged sputum. Patient states she has also been experiencing worsening SOB with exertion and ADLs. Pt also experiencing significant orthopnea, but denies palpitations, syncope, or near syncope. Pt notes some CP when coughing, but not with exertion. During HD yesterday she became extremely SOB and was transferred to ED where she was found to have a large pleural effusion now s/p chest tube. Pt is currently lying in bed still with hemoptysis, and appears very ill. Pt denies any fevers, chills, N/V/D, headache, or dizziness.      REVIEW OF SYMPTOMS:   Constitutional: Denied: fever, chills, weight loss or gain  Eyes: Denied: reddened ayes, eye discharge, eye pain  ENMT: Denied: ear pain, nasal discharge, mouth pain, throat pain or swelling  Cardiovascular: Denied: palpitation, arrhythmia, irregular or fast heart beats, edema  Respiratory: AS PER HPI   GI: Denied: Abdominal pain, nausea, vomiting, diarrhea, constipation, melena, rectal bleed  : Denied: hematuria, frequency  Musculoskeletal: Denied: Muscle aches, weakness, pain  Hematology/lymp: Denied: Bleeding disorder, anemia, blood clotting  Neuro: Denied: Headache, light headedness, dizziness, numbness, aphasia, dysarthria, seizure,  syncope, near syncope  Psych: Denied: depression, anxiety  Integumentary/skin: Denied: rash, bruises, ecchymosis, itching  Allergy/Immunology: denied environmental or drug allergies    ALL OTHER REVIEW OF SYSTEMS ARE NEGATIVE.    MEDICATIONS  (STANDING):  aspirin enteric coated 81 milliGRAM(s) Oral daily  azithromycin  IVPB      azithromycin  IVPB 500 milliGRAM(s) IV Intermittent once  carvedilol 12.5 milliGRAM(s) Oral every 12 hours  cefTRIAXone   IVPB 1 Gram(s) IV Intermittent once  cefTRIAXone   IVPB      colchicine 0.6 milliGRAM(s) Oral daily  dextrose 5%. 1000 milliLiter(s) (50 mL/Hr) IV Continuous <Continuous>  dextrose 50% Injectable 12.5 Gram(s) IV Push once  dextrose 50% Injectable 25 Gram(s) IV Push once  dextrose 50% Injectable 25 Gram(s) IV Push once  docusate sodium 100 milliGRAM(s) Oral two times a day  ferrous    sulfate 325 milliGRAM(s) Oral daily  heparin  Injectable 5000 Unit(s) SubCutaneous every 12 hours  insulin lispro (HumaLOG) corrective regimen sliding scale   SubCutaneous three times a day before meals  insulin lispro (HumaLOG) corrective regimen sliding scale   SubCutaneous at bedtime    MEDICATIONS  (PRN):  dextrose 40% Gel 15 Gram(s) Oral once PRN Blood Glucose LESS THAN 70 milliGRAM(s)/deciliter  glucagon  Injectable 1 milliGRAM(s) IntraMuscular once PRN Glucose LESS THAN 70 milligrams/deciliter  senna 2 Tablet(s) Oral at bedtime PRN Constipatio    PAST MEDICAL & SURGICAL HISTORY:  NICM with HFrEF (EF 40-45%)  Pleural effusion  Pericardial effusion  End stage renal disease on dialysis  HLD (hyperlipidemia)  HTN (hypertension)  DM (diabetes mellitus)  A-V fistula  Encounter for dialysis catheter care      FAMILY HISTORY:  No pertinent family history in first degree relatives      SOCIAL HISTORY:    CIGARETTES:  No    ALCOHOL: No    DRUGS: No    Allergies    No Known Allergies    Intolerances        Vital Signs Last 24 Hrs  T(C): 36.9 (30 Aug 2018 08:07), Max: 37.1 (30 Aug 2018 03:25)  T(F): 98.4 (30 Aug 2018 08:07), Max: 98.7 (30 Aug 2018 03:25)  HR: 87 (30 Aug 2018 08:07) (81 - 95)  BP: 164/99 (30 Aug 2018 08:07) (152/83 - 164/99)  RR: 20 (30 Aug 2018 08:07) (20 - 22)  SpO2: 96% (30 Aug 2018 06:04) (92% - 96%)    PHYSICAL EXAM:      Constitutional: No fever, chills, NAD, ill-appearing    Eyes: Not reddened, no discharge    ENMT: No discharge, No pain    Neck: No JVD, No Bruit    Back: No CVA tenderness    Respiratory: Diffuse rales and rhonchi throughout entire lung field     Cardiovascular: RRR, Normal S1-2, No S3-, No friction rub murmur    Gastrointestinal: Soft, NT/ND. BS+, No organomegaly    Extremities: No edema    Vascular: Distal pulses intact    Neurological: Alert, awake, oriented, able to move extremities, speach is clear    Skin: No ecchymosis, no rash    Musculoskeletal: Non tender, no weaknss    Psychiatric: Aproppriate mood, no anxiety        INTERPRETATION OF TELEMETRY: NSR    ECG: NSR, LAD, left atrial enlargement, LVH with early repolarization abnormalities in lateral leads, new from previous EKG     I&O's Detail      LABS:                        10.3   5.2   )-----------( 171      ( 30 Aug 2018 00:34 )             33.7     08-30    138  |  97<L>  |  18.0  ----------------------------<  183<H>  4.1   |  26.0  |  2.85<H>    Ca    8.4<L>      30 Aug 2018 00:34    TPro  7.1  /  Alb  3.4  /  TBili  0.6  /  DBili  x   /  AST  22  /  ALT  9   /  AlkPhos  110  08-30        PT/INR - ( 30 Aug 2018 00:34 )   PT: 13.5 sec;   INR: 1.22 ratio         PTT - ( 30 Aug 2018 00:34 )  PTT:29.2 sec    I&O's Summary      RADIOLOGY & ADDITIONAL STUDIES:  X-ray:  < from: Xray Chest 2 Views PA/Lat (08.29.18 @ 23:35) >  EXAM:  XR CHEST PA LAT 2V                          PROCEDURE DATE:  08/29/2018          INTERPRETATION:      INDICATION: Shortness of breath and cough.    PA and lateral views of chest. Comparison is made to prior study of   5/17/1983.    FINDINGS/  IMPRESSION:       Consolidation right lower lobe with moderate right pleural effusion and   compressive atelectasis. Patchy infiltrate left lung base and small left   pleural effusion. Dual port catheter tip in region of SVC. Cardiomegaly.   Degenerative spondylosis.                  KESHA WITT M.D., ATTENDING RADIOLOGIST  This document has been electronically signed. Aug 30 2018  9:25AM      < from: CT Chest No Cont (08.30.18 @ 03:06) >  FINDINGS:   Evaluation of the mediastinum, pleura and soft tissues is limited without   intravenous contrast.    Lungs and airways: The trachea and main bronchiare patent. Nodular   airspace consolidation with air bronchogram in the right upper lobe and   middle lobes with near complete airspace consolidation of the right lower   lobe. Diffuse groundglass opacities throughout the lungs. Linear   atelectasisand/or scarring in the lingula. Small bilateral pleural   effusions with compressive atelectasis. No pneumothorax.     Heart and vessels: Right chest [id number] catheter with tip at the   cavoatrial junction. Multichamber cardiomegaly. No pericardial effusion.   The thoracic aorta and main pulmonary artery are normal caliber.   Partially imaged left upper extremity vascular graft.    Mediastinum and soft tissues: The thyroid gland is unremarkable. There is   no axillary, mediastinal or hilar adenopathy.     Upper abdomen: Ascites. Hypodense renal lesions too small to characterize   and bilateral renal cysts.     Bones: Within normal limits.    IMPRESSION:   Nodular multilobar airspace consolidation, most pronounced in the right   lung. Diffuse groundglass opacities. Linear atelectasis and/or scarring   in the lingula. Small bilateral pleural effusions and compressive   atelectasis.    Differential diagnosis may include pneumonia, pulmonary edema, hemorrhage   or inflammatory/neoplasticinfiltrates, dependently on the clinical   setting.                GREGG CHRISTY M.D., ATTENDING RADIOLOGIST  This document has been electronically signed. Aug 30 2018  3:46AM        < end of copied text >              < end of copied text >    PREVIOUS DIAGNOSTIC TESTING:      ECHO  FINDINGS: < from: TTE Echo Limited or F/U (05.14.18 @ 11:09) >  PHYSICIAN INTERPRETATION:  Left Ventricle: The left ventricular internal cavity size is normal. Left   ventricular wall thickness is normal.  Global LV systolic function was mildly decreased. Left ventricular   ejection fraction, by visual estimation, is 45 to 50%. Spectral Doppler   shows impaired relaxation pattern of left ventricular myocardial filling   (Grade I diastolic dysfunction).  Right Ventricle: Normal right ventricular size and function.  Left Atrium: Mildly enlarged left atrium.  Right Atrium: The right atrium is normal in size.  Pericardium: Trivial pericardial effusion is present. There is a moderate   pleural effusion in both left and right lateral regions.  Mitral Valve: Thickening of the anterior and posterior mitral valve   leaflets. Mild mitral valve regurgitation is seen.  Tricuspid Valve: Structurally normal tricuspid valve, with normal leaflet   excursion. Trivial tricuspid regurgitation is visualized. Adequate TR   velocity was not obtained to accurately assess RVSP.  Aortic Valve: The aortic valve is trileaflet. No evidence of aortic valve   regurgitation is seen.  Pulmonic Valve: Structurally normal pulmonic valve, with normal leaflet   excursion. Trace pulmonic valve regurgitation.  Aorta: The aorta was not well visualized.  Pulmonary Artery: The main pulmonary artery is normal in size.  Venous: The inferior vena cava was normal sized, with respiratory size   variation greater than 50%.       Summary:   1. Left ventricular ejection fraction, by visual estimation, is 45 to   50%.   2. Mildly decreased global left ventricular systolic function.   3. Spectral Doppler shows impaired relaxation pattern of left   ventricular myocardial filling (Grade I diastolic dysfunction).   4. Moderate pleural effusion in both left and right lateral regions.   5. Trivial pericardial effusion.   6. Thickening of the anterior and posterior mitral valve leaflets.   7. Limited follow up echo for pericardial effusion. Echo done on 3/4/18   moderate pericardial effusion seen.    [id number] Bryce Mario MD, Electronically signed on 5/14/2018 at 1:29:07 PM              < end of copied text >    CATHETERIZATION  FINDINGS:   < from: Cardiac Cath Lab - Adult (03.23.18 @ 13:53) >  VENTRICLES: There were no left ventricular global or regional wall motion  abnormalities. Global left ventricular function was normal. EF calculated  by contrast ventriculography was 55 %.  VALVES: AORTIC VALVE: No significant aortic valve gradient. MITRAL VALVE:  The mitral valve exhibited trivial regurgitation (less than 1+).  CORONARY VESSELS: The coronary circulation is right dominant.  LM:   --  LM: Normal. The vessel was normal sized, not calcified, and not  tortuous. Angiography showed no evidence of disease.  LAD:   --  LAD: Normal. The vessel was normal sized, not calcified, and not  tortuous. Angiography showed minor luminal irregularities with no flow  limiting lesions.  CX:   --  Circumflex: Normal. The vessel was normal sized, not calcified,  and excessively tortuous.Angiography showed no evidence of disease.  RCA:   --  RCA: Normal. The vessel was normal sized, not calcified, and  moderately tortuous. Angiography showed no evidence of disease.  COMPLICATIONS: There were no complications. No complications occurred  during the cath lab visit.  DIAGNOSTIC IMPRESSIONS: There is mild irregularity of the coronary anatomy.  Left ventricular function is normal (LVEF=55%).  DIAGNOSTIC RECOMMENDATIONS: The patient should continue with the present  medications. Patientmanagement should include aggressive medical therapy  and resumption of all previous activities in 2 days.  Prepared and signed by  Jesús Godinez MD  Signed 03/23/2018 14:30:17    < end of copied text >
HPI: 59 yo F ESRD on dialysis received a R permacath and L brachiocephalic AV graft in March 2018 by Dr. Segundo. She was lost to followup and since then, HD has been accessing exclusively her permacath.  She was admited on 8/29 overnight after SOB during dialysis session. A Sizeable R pleural effusion was found, and thoracic surgery team placed a R pigtail chest tube which has been draining straw colored fluid. Cultures are negative thus far  Vascular surgery was consulted today for the question of whether it's OK to attempt to use the LUE AV graft for HD.    PMH:  DM (diabetes mellitus)    End stage renal disease on dialysis    HLD (hyperlipidemia)    HTN (hypertension)    Pericardial effusion    Pleural effusion.    PSH (pertinent)  3/22/18 - R sided permacath Dr Segundo  3/28/18 L UE AV Graft Dr Segundo    ROS:  HEENT: Denies headache, blurry vision  RESPIRATORY: Denies cough  CARDIAC: Denies chest pain, racing heart  GASTROINTESTINAL: +Nausea/ vomiting  GENITOURINARY: Denies dysuria, hematuria  NEUROLOGIC: Denies headaches, dizziness  MUSCULOSKELETAL: Denies muscle weakness  VASCULAR: Denies claudication and cramping.  ENDOCRINOLOGY: Denies heat or cold intolerance  HEMATOLOGY: Denies easy bleeding or blood transfusion.  DERMATOLOGY: Denies changes in moles or pigmentation changes  PSYCHIATRY: Denies depression, agitation or anxiety      MEDICATIONS  (STANDING):  aspirin enteric coated 81 milliGRAM(s) Oral daily  azithromycin  IVPB      azithromycin  IVPB 500 milliGRAM(s) IV Intermittent every 24 hours  carvedilol 12.5 milliGRAM(s) Oral every 12 hours  cefTRIAXone   IVPB 1 Gram(s) IV Intermittent every 24 hours  cefTRIAXone   IVPB      chlorhexidine 2% Cloths 1 Application(s) Topical <User Schedule>  dextrose 5%. 1000 milliLiter(s) (50 mL/Hr) IV Continuous <Continuous>  dextrose 50% Injectable 12.5 Gram(s) IV Push once  dextrose 50% Injectable 25 Gram(s) IV Push once  dextrose 50% Injectable 25 Gram(s) IV Push once  docusate sodium 100 milliGRAM(s) Oral two times a day  ferrous    sulfate 325 milliGRAM(s) Oral daily  heparin  Injectable 5000 Unit(s) SubCutaneous every 12 hours  insulin lispro (HumaLOG) corrective regimen sliding scale   SubCutaneous three times a day before meals  insulin lispro (HumaLOG) corrective regimen sliding scale   SubCutaneous at bedtime  sacubitril 24 mG/valsartan 26 mG 1 Tablet(s) Oral two times a day    MEDICATIONS  (PRN):  acetaminophen   Tablet 650 milliGRAM(s) Oral every 6 hours PRN For Temp greater than 38 C (100.4 F)  acetaminophen   Tablet. 650 milliGRAM(s) Oral every 6 hours PRN Mild Pain (1 - 3)  dextrose 40% Gel 15 Gram(s) Oral once PRN Blood Glucose LESS THAN 70 milliGRAM(s)/deciliter  glucagon  Injectable 1 milliGRAM(s) IntraMuscular once PRN Glucose LESS THAN 70 milligrams/deciliter  morphine  - Injectable 2 milliGRAM(s) IV Push every 4 hours PRN Severe Pain (7 - 10)  oxyCODONE    5 mG/acetaminophen 325 mG 2 Tablet(s) Oral every 4 hours PRN Moderate Pain (4 - 6)  senna 2 Tablet(s) Oral at bedtime PRN Constipation      Vital Signs Last 24 Hrs  T(C): 37.2 (31 Aug 2018 04:55), Max: 39.3 (30 Aug 2018 15:53)  T(F): 98.9 (31 Aug 2018 04:55), Max: 102.8 (30 Aug 2018 15:53)  HR: 71 (31 Aug 2018 04:55) (71 - 96)  BP: 127/75 (31 Aug 2018 11:50) (117/68 - 157/90)  BP(mean): --  RR: 18 (31 Aug 2018 04:55) (18 - 20)  SpO2: 96% (31 Aug 2018 08:02) (85% - 100%)    PE  Gen: Pt has vomitus bag in hand but is pleasant interactive and A&Ox3  CV: RRR nontachy. R chest permacath in place incision CDI, no sign of infection  Abd: Soft NTND  Ext:  WWP.  LUE: Palpable strong thrill in AV graft. Graft appears superficial, close to skin which is desirable. L radial pulse 1+ palpable. Full motor function of hand. No numbness but pt c/o subjective pins and needles to all fingertips since the time of operation  Neuro: CN II-XII intact, nonfocal      I&O's Detail    30 Aug 2018 07:01  -  31 Aug 2018 07:00  --------------------------------------------------------  IN:  Total IN: 0 mL    OUT:    Chest Tube: 2000 mL    Other: 1600 mL  Total OUT: 3600 mL    Total NET: -3600 mL      31 Aug 2018 07:01  -  31 Aug 2018 15:45  --------------------------------------------------------  IN:    Oral Fluid: 360 mL  Total IN: 360 mL    OUT:  Total OUT: 0 mL    Total NET: 360 mL          LABS:                        10.3   3.2   )-----------( 190      ( 31 Aug 2018 07:56 )             34.8     08-31    136  |  95<L>  |  21.0<H>  ----------------------------<  174<H>  4.9   |  29.0  |  2.89<H>    Ca    8.5<L>      31 Aug 2018 07:56  Phos  4.1     08-31  Mg     2.1     08-31    TPro  7.1  /  Alb  3.4  /  TBili  0.6  /  DBili  x   /  AST  22  /  ALT  9   /  AlkPhos  110  08-30    PT/INR - ( 30 Aug 2018 00:34 )   PT: 13.5 sec;   INR: 1.22 ratio         PTT - ( 30 Aug 2018 00:34 )  PTT:29.2 sec      RADIOLOGY & ADDITIONAL STUDIES:
Surgeon: Tennilel  Consult requesting by: ED    HISTORY OF PRESENT ILLNESS:  58F, pmhx DM, HTN,HLD, ESRD on HD M/W/F sent to ED by dialysis center for hemoptysis. In ED CXR with large pleural effusion, increased in size from 5/2018 CXR. Pt c/o SOB. Thoracic surgery consulted for pigtail placement.  Pt c/o SOB and cough with bloody sputum since monday. Poor hisotorian and unable to state how frequently or quantify amount.  states the hemoptysis was worse during the day when she would lay down.   also states that pt would become SOB first which would initiate the coughing. no recent sick contacts or travels, denies fever (however pt diaphoretic and warm on exam) + nausea and gagging from aggressive coughing, denies vomiting, diarrhea, + constipation, no BM in 7 days per family, denies dizziness, palpitations, CP;    Of note, pt with R pleural effusion in March, s/p IR thoracentesis for 1100cc serous fluid, cultures negative.     PAST MEDICAL & SURGICAL HISTORY:  Pleural effusion  Pericardial effusion  End stage renal disease on dialysis  HLD (hyperlipidemia)  HTN (hypertension)  DM (diabetes mellitus)  A-V fistula  Encounter for dialysis catheter care    MEDICATIONS  (STANDING):    MEDICATIONS  (PRN):    Allergies: No Known Allergies    SOCIAL HISTORY:  denies cigarettes and ETOH, lives with     FAMILY HISTORY:  No pertinent family history in first degree relatives    Review of Systems  CONSTITUTIONAL:  Fevers[ ] chills[ ] sweats[ ] fatigue[ ] weight loss[ ] weight gain [ ]                                     NEGATIVE [X ]   NEURO:  parathesias[ ] seizures [ ]  syncope [ ]  confusion [ ]                                                                                NEGATIVE[X ]   CV:  chest pain[ ] palpitations[ ] HAYES [ X] diaphoresis [ ]                                                                                           NEGATIVE[ ]   RESPIRATORY:  wheezing[ ] SOB[X ] cough [ ] sputum[ ] hemoptysis[X ]                                                                  NEGATIVE[ ]   GI:  nausea[X ]  vomiting [ ]  diarrhea[ ] constipation [ ] melena [ ]                                                                      NEGATIVE[ ]   MUSKULOSKELETAL:  arthritis[ ]  joint swelling [ ] muscle weakness [ ]                                                                NEGATIVE[X ]   SKIN/BREAST:  rash[ ] itching [ ]  hair loss[ ] masses[ ]                                                                                              NEGATIVE[X ]     Vital Signs Last 24 Hrs  T(C): 37.1 (30 Aug 2018 03:25), Max: 37.1 (30 Aug 2018 03:25)  T(F): 98.7 (30 Aug 2018 03:25), Max: 98.7 (30 Aug 2018 03:25)  HR: 81 (30 Aug 2018 03:25) (81 - 95)  BP: 152/83 (30 Aug 2018 03:25) (152/83 - 161/102)  BP(mean): --  RR: 20 (30 Aug 2018 03:25) (20 - 22)  SpO2: 96% (30 Aug 2018 03:25) (92% - 96%)    PE:   Gen: diaphoretic, warm to touch   Neuro: A&Ox3, non focal  Pulm: decreased breath sounds on R half way up lung fields, diminished L base, + exp wheeze throughout  CV: S1S2 RRR  Abd: + BS soft, NT mild dist  Ext: trace b/l LE edema L>R, WWP  + R chest permacath                                                           LABS:                        10.3   5.2   )-----------( 171      ( 30 Aug 2018 00:34 )             33.7     08-30    138  |  97<L>  |  18.0  ----------------------------<  183<H>  4.1   |  26.0  |  2.85<H>    Ca    8.4<L>      30 Aug 2018 00:34    TPro  7.1  /  Alb  3.4  /  TBili  0.6  /  DBili  x   /  AST  22  /  ALT  9   /  AlkPhos  110  08-30  PT/INR - ( 30 Aug 2018 00:34 )   PT: 13.5 sec;   INR: 1.22 ratio    PTT - ( 30 Aug 2018 00:34 )  PTT:29.2 sec  Serum Pro-Brain Natriuretic Peptide: >88609 pg/mL (08-30-18 @ 00:34)

## 2018-09-01 NOTE — CHART NOTE - NSCHARTNOTEFT_GEN_A_CORE
Rapid response team came to reassess patient. She mentions feeling better, overall OK. No SOB, chest pain, u other symptoms.   patient to go to HD and to receive Zosyn and vanco right after ID consulted.   Not in acute distress,  Currently afebrile (99F)  On O2 supplemental, 2L nasal canula  Lactate: 0.6  BCx x 2, UCx sent to lab  Procalcitonin: 0.52. Patient already with ABx ordered, as above and rapid response note Rapid response team came to reassess patient. She mentions feeling better, overall OK. No SOB, chest pain, u other symptoms.   patient to go to HD and to receive Zosyn and vanco right after.  Not in acute distress,  Currently afebrile (99F)  On O2 supplemental, 2L nasal canula  CXR less overloaded but with concerning for infiltrates not previously reported.   Procalcitonin: 0.52. Patient already with ABx ordered, as above and rapid response note  Lactate: 0.6  BCx x 2, UCx sent to lab  ID consulted. Rapid response team came to reassess patient. She mentions feeling better, overall OK. No SOB, chest pain, u other symptoms.   patient to go to HD and to receive Zosyn and vanco right after.  Not in acute distress,  Currently afebrile (99F)  On O2 supplemental, 2L nasal canula  CXR less overloaded but with concerning for infiltrates not previously reported.   Procalcitonin: 0.52. Patient already with ABx ordered, as above and rapid response note  Lactate: 0.6  BCx x 2, UCx sent to lab  ID consulted.  primary team to continue management

## 2018-09-01 NOTE — PROGRESS NOTE ADULT - ASSESSMENT
ESRD   HD today   Using arm   Will ask vascular to remove permacath     Anemia - hgb stable     RO - low calcium and phos   IV calcium - see orders   Add Rocaltrol bid   Add Tums 1 gram tis between meals     PNA   S/p drainage of effusion   Abx as per ID   Follow up cultures   Follow Vanco levels     Afib - Awaits future Watchman procedure ?

## 2018-09-02 LAB
ANION GAP SERPL CALC-SCNC: 16 MMOL/L — SIGNIFICANT CHANGE UP (ref 5–17)
BUN SERPL-MCNC: 21 MG/DL — HIGH (ref 8–20)
CALCIUM SERPL-MCNC: 8.3 MG/DL — LOW (ref 8.6–10.2)
CHLORIDE SERPL-SCNC: 93 MMOL/L — LOW (ref 98–107)
CO2 SERPL-SCNC: 21 MMOL/L — LOW (ref 22–29)
CREAT SERPL-MCNC: 3.18 MG/DL — HIGH (ref 0.5–1.3)
CULTURE RESULTS: SIGNIFICANT CHANGE UP
GLUCOSE BLDC GLUCOMTR-MCNC: 169 MG/DL — HIGH (ref 70–99)
GLUCOSE BLDC GLUCOMTR-MCNC: 173 MG/DL — HIGH (ref 70–99)
GLUCOSE BLDC GLUCOMTR-MCNC: 175 MG/DL — HIGH (ref 70–99)
GLUCOSE BLDC GLUCOMTR-MCNC: 255 MG/DL — HIGH (ref 70–99)
GLUCOSE SERPL-MCNC: 158 MG/DL — HIGH (ref 70–115)
HCT VFR BLD CALC: 33.1 % — LOW (ref 37–47)
HGB BLD-MCNC: 10.1 G/DL — LOW (ref 12–16)
MCHC RBC-ENTMCNC: 28.7 PG — SIGNIFICANT CHANGE UP (ref 27–31)
MCHC RBC-ENTMCNC: 30.5 G/DL — LOW (ref 32–36)
MCV RBC AUTO: 94 FL — SIGNIFICANT CHANGE UP (ref 81–99)
PHOSPHATE SERPL-MCNC: 2.6 MG/DL — SIGNIFICANT CHANGE UP (ref 2.4–4.7)
PLATELET # BLD AUTO: 219 K/UL — SIGNIFICANT CHANGE UP (ref 150–400)
POTASSIUM SERPL-MCNC: 4.6 MMOL/L — SIGNIFICANT CHANGE UP (ref 3.5–5.3)
POTASSIUM SERPL-SCNC: 4.6 MMOL/L — SIGNIFICANT CHANGE UP (ref 3.5–5.3)
RBC # BLD: 3.52 M/UL — LOW (ref 4.4–5.2)
RBC # FLD: 14.3 % — SIGNIFICANT CHANGE UP (ref 11–15.6)
SODIUM SERPL-SCNC: 130 MMOL/L — LOW (ref 135–145)
SPECIMEN SOURCE: SIGNIFICANT CHANGE UP
WBC # BLD: 3.4 K/UL — LOW (ref 4.8–10.8)
WBC # FLD AUTO: 3.4 K/UL — LOW (ref 4.8–10.8)

## 2018-09-02 PROCEDURE — 99233 SBSQ HOSP IP/OBS HIGH 50: CPT

## 2018-09-02 PROCEDURE — 71045 X-RAY EXAM CHEST 1 VIEW: CPT | Mod: 26

## 2018-09-02 PROCEDURE — 99232 SBSQ HOSP IP/OBS MODERATE 35: CPT

## 2018-09-02 RX ADMIN — Medication 2: at 08:18

## 2018-09-02 RX ADMIN — PIPERACILLIN AND TAZOBACTAM 200 GRAM(S): 4; .5 INJECTION, POWDER, LYOPHILIZED, FOR SOLUTION INTRAVENOUS at 17:19

## 2018-09-02 RX ADMIN — CALCITRIOL 0.25 MICROGRAM(S): 0.5 CAPSULE ORAL at 05:43

## 2018-09-02 RX ADMIN — Medication 1 TABLET(S): at 13:03

## 2018-09-02 RX ADMIN — CARVEDILOL PHOSPHATE 12.5 MILLIGRAM(S): 80 CAPSULE, EXTENDED RELEASE ORAL at 17:14

## 2018-09-02 RX ADMIN — Medication 325 MILLIGRAM(S): at 12:15

## 2018-09-02 RX ADMIN — SACUBITRIL AND VALSARTAN 1 TABLET(S): 24; 26 TABLET, FILM COATED ORAL at 05:43

## 2018-09-02 RX ADMIN — Medication 100 MILLIGRAM(S): at 17:14

## 2018-09-02 RX ADMIN — Medication 2: at 12:15

## 2018-09-02 RX ADMIN — CARVEDILOL PHOSPHATE 12.5 MILLIGRAM(S): 80 CAPSULE, EXTENDED RELEASE ORAL at 05:43

## 2018-09-02 RX ADMIN — HEPARIN SODIUM 5000 UNIT(S): 5000 INJECTION INTRAVENOUS; SUBCUTANEOUS at 17:14

## 2018-09-02 RX ADMIN — Medication 81 MILLIGRAM(S): at 12:15

## 2018-09-02 RX ADMIN — PIPERACILLIN AND TAZOBACTAM 200 GRAM(S): 4; .5 INJECTION, POWDER, LYOPHILIZED, FOR SOLUTION INTRAVENOUS at 05:43

## 2018-09-02 RX ADMIN — CHLORHEXIDINE GLUCONATE 1 APPLICATION(S): 213 SOLUTION TOPICAL at 05:43

## 2018-09-02 RX ADMIN — Medication 100 MILLIGRAM(S): at 05:43

## 2018-09-02 RX ADMIN — Medication 1 TABLET(S): at 22:01

## 2018-09-02 RX ADMIN — Medication 1 TABLET(S): at 05:43

## 2018-09-02 RX ADMIN — CALCITRIOL 0.25 MICROGRAM(S): 0.5 CAPSULE ORAL at 17:14

## 2018-09-02 RX ADMIN — HEPARIN SODIUM 5000 UNIT(S): 5000 INJECTION INTRAVENOUS; SUBCUTANEOUS at 05:43

## 2018-09-02 RX ADMIN — Medication 2: at 17:15

## 2018-09-02 RX ADMIN — Medication 2: at 22:08

## 2018-09-02 RX ADMIN — SODIUM CHLORIDE 43 MILLILITER(S): 9 INJECTION, SOLUTION INTRAVENOUS at 12:15

## 2018-09-02 RX ADMIN — SACUBITRIL AND VALSARTAN 1 TABLET(S): 24; 26 TABLET, FILM COATED ORAL at 17:14

## 2018-09-02 NOTE — PROGRESS NOTE ADULT - SUBJECTIVE AND OBJECTIVE BOX
Pt feeling generally better. reports mild dyspnea and lightheadedness which have been improving.   Events yesterday noted- pt with fever and code sepsis called, hemodynamically stable and started on broad spectrum antibiotics. Today has been afebrile.   Underwent HD yesterday which she has tolerated.     MEDICATIONS  (STANDING):  aspirin enteric coated 81 milliGRAM(s) Oral daily  calcitriol   Capsule 0.25 MICROGram(s) Oral two times a day  calcium carbonate   1250 mG (OsCal) 1 Tablet(s) Oral three times a day  carvedilol 12.5 milliGRAM(s) Oral every 12 hours  chlorhexidine 2% Cloths 1 Application(s) Topical <User Schedule>  dextrose 5%. 1000 milliLiter(s) (50 mL/Hr) IV Continuous <Continuous>  dextrose 50% Injectable 12.5 Gram(s) IV Push once  dextrose 50% Injectable 25 Gram(s) IV Push once  dextrose 50% Injectable 25 Gram(s) IV Push once  docusate sodium 100 milliGRAM(s) Oral two times a day  ferrous    sulfate 325 milliGRAM(s) Oral daily  heparin  Injectable 5000 Unit(s) SubCutaneous every 12 hours  insulin lispro (HumaLOG) corrective regimen sliding scale   SubCutaneous three times a day before meals  insulin lispro (HumaLOG) corrective regimen sliding scale   SubCutaneous at bedtime  piperacillin/tazobactam IVPB. 2.25 Gram(s) IV Intermittent every 12 hours  sacubitril 24 mG/valsartan 26 mG 1 Tablet(s) Oral two times a day  sodium chloride 0.9% 1000 milliLiter(s) (43 mL/Hr) IV Continuous <Continuous>    MEDICATIONS  (PRN):  acetaminophen   Tablet 650 milliGRAM(s) Oral every 6 hours PRN For Temp greater than 38 C (100.4 F)  acetaminophen   Tablet. 650 milliGRAM(s) Oral every 6 hours PRN Mild Pain (1 - 3)  dextrose 40% Gel 15 Gram(s) Oral once PRN Blood Glucose LESS THAN 70 milliGRAM(s)/deciliter  glucagon  Injectable 1 milliGRAM(s) IntraMuscular once PRN Glucose LESS THAN 70 milligrams/deciliter  morphine  - Injectable 2 milliGRAM(s) IV Push every 4 hours PRN Severe Pain (7 - 10)  oxyCODONE    5 mG/acetaminophen 325 mG 2 Tablet(s) Oral every 4 hours PRN Moderate Pain (4 - 6)  senna 2 Tablet(s) Oral at bedtime PRN Constipation      Allergies    No Known Allergies    Intolerances      PAST MEDICAL & SURGICAL HISTORY:  Pleural effusion  Pericardial effusion  End stage renal disease on dialysis  HLD (hyperlipidemia)  HTN (hypertension)  DM (diabetes mellitus)  A-V fistula  Encounter for dialysis catheter care      Vital Signs Last 24 Hrs  T(C): 36.7 (02 Sep 2018 10:35), Max: 37.1 (01 Sep 2018 21:00)  T(F): 98 (02 Sep 2018 10:35), Max: 98.8 (01 Sep 2018 21:00)  HR: 79 (02 Sep 2018 10:35) (78 - 84)  BP: 130/85 (02 Sep 2018 10:35) (130/85 - 160/89)  BP(mean): --  RR: 18 (02 Sep 2018 10:35) (18 - 20)  SpO2: 95% (02 Sep 2018 10:35) (95% - 96%)    Physical Exam:    HEENT:   Normal oral mucosa, PERRL, EOMI	  Lymphatic: No lymphadenopathy  Cardiovascular: Normal S1 S2, No JVD, III/VI systolic murmur, No edema  Respiratory: left base decreased breath sounds  Psychiatry: A & O x 3, Mood & affect appropriate  Gastrointestinal:  Soft, Non-tender, + BS	  Skin: No rashes, No ecchymoses, No cyanosis  Neurologic: Non-focal  Extremities: Normal range of motion, No clubbing, cyanosis or edema  Vascular: Peripheral pulses palpable 2+ bilaterally, warm; R perm cath, LUE AVF with palpable thrill  LABS:                        10.1   3.4   )-----------( 219      ( 02 Sep 2018 08:51 )             33.1     09-02    130<L>  |  93<L>  |  21.0<H>  ----------------------------<  158<H>  4.6   |  21.0<L>  |  3.18<H>    Ca    8.3<L>      02 Sep 2018 08:51  Phos  2.6     09-02    TPro  3.2<L>  /  Alb  1.6<L>  /  TBili  0.2<L>  /  DBili  x   /  AST  11  /  ALT  <5  /  AlkPhos  43  09-      Urinalysis Basic - ( 01 Sep 2018 10:13 )    Color: Yellow / Appearance: Slightly Turbid / S.010 / pH: x  Gluc: x / Ketone: Negative  / Bili: Negative / Urobili: Negative mg/dL   Blood: x / Protein: 500 mg/dL / Nitrite: Negative   Leuk Esterase: Moderate / RBC: >50 /HPF / WBC >50   Sq Epi: x / Non Sq Epi: Moderate / Bacteria: Moderate        RADIOLOGY & ADDITIONAL TESTS:  < from: TTE Echo Complete w/Doppler (18 @ 11:22) >   1. Left ventricularejection fraction, by visual estimation, is 20 to   25%.   2. Severely decreased global left ventricular systolic function.   3. Spectral Doppler shows pseudonormal pattern of left ventricular   myocardial filling (Grade II diastolic dysfunction).   4. Moderate pleural effusion in the right lateral region.   5. There is no evidence of pericardial effusion.   6. Moderate mitral valve regurgitation.   7. Thickening and calcification of the posterior mitral valve leaflet.   8. Moderate tricuspid regurgitation.   9. Trace pulmonic valve regurgitation.  10. Estimated pulmonary artery systolic pressure is 35.6 mmHg assuming a   right atrial pressure of 15 mmHg, which is consistent with borderline   pulmonary hypertension.    < end of copied text >    < from: Cardiac Cath Lab - Adult (18 @ 13:53) >  VENTRICLES: There were no left ventricular global or regional wall motion  abnormalities. Global left ventricular function was normal. EF calculated  by contrast ventriculography was 55 %.  VALVES: AORTIC VALVE: No significant aortic valve gradient. MITRAL VALVE:  The mitral valve exhibited trivial regurgitation (less than 1+).  CORONARY VESSELS: The coronary circulation is right dominant.  LM:   --  LM: Normal. The vessel was normal sized, not calcified, and not  tortuous. Angiography showed no evidence of disease.  LAD:   --  LAD: Normal. The vessel was normal sized, not calcified, and not  tortuous. Angiography showed minor luminal irregularities with no flow  limiting lesions.  CX:   --  Circumflex: Normal. The vessel was normal sized, not calcified,  and excessively tortuous.Angiography showed no evidence of disease.  RCA:   --  RCA: Normal. The vessel was normal sized, not calcified, and  moderately tortuous. Angiography showed no evidence of disease.  COMPLICATIONS: There were no complications. No complications occurred  during the cath lab visit.  DIAGNOSTIC IMPRESSIONS: There is mild irregularity of the coronary anatomy.  Left ventricular function is normal (LVEF=55%).  DIAGNOSTIC RECOMMENDATIONS: The patient should continue with the present  medications. Patientmanagement should include aggressive medical therapy  and resumption of all previous activities in 2 days.    < end of copied text >

## 2018-09-02 NOTE — PROGRESS NOTE ADULT - ASSESSMENT
58 year old female esrd on hd, history of pericardial effusion, dm, a fib not on anticoagulation secondary to gi bleed, anemia, Pubic rami fracture, anemia. Patient was in her usual state of poor health when she became severely short of breath in hemo-dialysis. She was sent to the er where she was found to have a large pleural effusion and a chest tube was placed and the fluid was sent for blood culture.   PLEURAL FLUID CX NEGATIVE   BLOOD CX SO FAR NEG  CT OUT THIS AM   CONTINUE ZOSYN  FOR NOW

## 2018-09-02 NOTE — PROGRESS NOTE ADULT - ASSESSMENT
58F, h/o pleural effusion s/p thoracentesis on R 3/2018, now p/w SOB, hemoptysis since monday, found to have large pleural effusion.  Right pigtail catheter placed and now removed.  At this time, patient remains stable per thoracic surgery standpoint. Please reconsult if needed.

## 2018-09-02 NOTE — PROGRESS NOTE ADULT - ASSESSMENT
The patient is a 58 year old female with a history of ESRD on HD MWF, afib not on anticoagulation given history of GIB, chronic diastolic CHF who presented to the ER with complaints of worsening shortness of breath. CT on admission consistent with large right pleural effusion now status post pigtail placement by CTS.  Code sepsis called for fever. Started on IV zosyn, cultures ordered, ID consulted. Chest tube removed by CT surgery on 8/2. Vascular surgery consulted for permacath removal.     Assessment/Plan:    1. Sepsis secondary to right upper lobe pneumonia with possible parapneumonic effusion: IV zosyn day 2  Blood cultures x 2 ordered on 9/1  Blood cultures on 8/30 negative x 2  ID on consult  Chest tube removed this morning   pleural fluid cultures negative thus far.    2. ESRD on HD MWF: Renal following.     3. Acute on chronic systolic CHF with NICM: Echocardiogram with  global hypokinesia with low ef of 25%. Spoke with cardio, cath early this  year with non obstructive CAD. Added entresto; on BB. Cardio following.     4. AFib: Rate controlled; Unable to tolerate coumadin due to GI bleed. Was being evaluated for Watchmen procedure, however patient missed appointment in office.      5. Pericardial effusion - hx of uremic pericarditis, currently stable.  No pericardial effusion on echo; colchicine discontinued.     6. Diabetes mellitus type 2: HSS> MOnitor bsl.     7. Hypocalcemia: Improved, continue ca carb and calcitriol    VTE- Heparin Subcut

## 2018-09-02 NOTE — PROGRESS NOTE ADULT - PROBLEM SELECTOR PLAN 1
Chest in place draining. I spoke with Dr Null who plans bronchoscopy or VAtS after chest tube can be removed. Antibiotics were added, await culture results.
Right pigtail catheter removed without issue.  F/U post-pull CXR.   Please re-consult if needed.
Maintain Right pigtail catheter for drainage.  Repeat CXR in AM.
Maintain Right pigtail catheter for drainage.  Repeat CXR in AM.

## 2018-09-02 NOTE — PROGRESS NOTE ADULT - SUBJECTIVE AND OBJECTIVE BOX
Subjective: "I feel better today."    Vital Signs:  Vital Signs Last 24 Hrs  T(C): 36.7 (09-02-18 @ 10:35), Max: 37.1 (09-01-18 @ 21:00)  T(F): 98 (09-02-18 @ 10:35), Max: 98.8 (09-01-18 @ 21:00)  HR: 79 (09-02-18 @ 10:35) (75 - 84)  BP: 130/85 (09-02-18 @ 10:35) (130/85 - 160/89)  RR: 18 (09-02-18 @ 10:35) (17 - 20)  SpO2: 95% (09-02-18 @ 10:35) (95% - 98%) on (O2)    Telemetry/Alarms:    Relevant labs, radiology and Medications reviewed    Pertinent Physical Exam  Neuro: AxO x3  Cardiac: S1S2, no murmurs  Pulm: CTA b/l, no wheezing   Abdomen: Soft, NT, ND  Peripheral: no peripheral edema    09-01 @ 07:01  -  09-02 @ 07:00  --------------------------------------------------------  IN:    Oral Fluid: 720 mL    Other: 900 mL    sodium chloride 0.9%: 516 mL  Total IN: 2136 mL    OUT:    Chest Tube: 160 mL    Other: 3400 mL  Total OUT: 3560 mL    Total NET: -1424 mL      09-02 @ 07:01  -  09-02 @ 11:31  --------------------------------------------------------  IN:    Oral Fluid: 360 mL  Total IN: 360 mL    OUT:  Total OUT: 0 mL    Total NET: 360 mL

## 2018-09-02 NOTE — PROGRESS NOTE ADULT - ASSESSMENT
A/P: 58 year old female with ESRD on HD (MW) previously with R chest permacath, now with LUE AV graft used for HD 9/1  - Plan to remove permacath after patient has has several successful HD sessions with use of LUE AV graft

## 2018-09-02 NOTE — PROGRESS NOTE ADULT - PROBLEM SELECTOR PLAN 2
Dr Gay consulted, question compliance with dialysis in the face of such fluid overload.
No further hemoptysis per pt.    Plan discussed in AM rounds with CT surgery team and Dr. Null.

## 2018-09-02 NOTE — PROGRESS NOTE ADULT - ASSESSMENT
57 y/o F PMHx of NICM, HFrEF,  TTE now with LVEF 20-25% which is worse than prior 40-45% , Afib not on AC due to GI bleed, pleural effusion, pericardial effusion, ESRD MWF HD, anemia, and pubic rami fracture transferred from HD facility due to SOB. Found to have worsening right pleural effusion s/p Pigtail catheter, and CHF with worsening LV function.   1. NICMP/CHF. Started on Entresto which she is tolerating so far. Remains on coreg. continue HD with fluid removal. 1  2.  Paroxysmal Afib- currently in SR, rates controlled.  Unable to tolerate coumadin due to GI bleed. OUtpt followup for consideration of Watchman procedure. Can consider GERALD for anatomy evaluation if remains in house.   3. Pericardial effusion - hx of uremic pericarditis, currently stable. Now off colchicine.   4. Pleural Effusion bilateral, R> L - s/p thoracentesis of right, PleurX catheter on right.    5. hypertension - well controlled on current regimen  6. Fever- infectious workup ongoing.     Dr. Salazar to resume care Tuesday. Call with any questions

## 2018-09-02 NOTE — PROGRESS NOTE ADULT - SUBJECTIVE AND OBJECTIVE BOX
[facility]  INFECTIOUS DISEASES AND INTERNAL MEDICINE at [place]  =======================================================  Jose Carlos Servin MD  Diplomates American Board of Internal Medicine and Infectious Diseases  =======================================================    JAROCHO MORALES [id number]    Follow up: pneumonia     Allergies:  No Known Allergies      Medications:  acetaminophen   Tablet 650 milliGRAM(s) Oral every 6 hours PRN  acetaminophen   Tablet. 650 milliGRAM(s) Oral every 6 hours PRN  aspirin enteric coated 81 milliGRAM(s) Oral daily  calcitriol   Capsule 0.25 MICROGram(s) Oral two times a day  calcium carbonate   1250 mG (OsCal) 1 Tablet(s) Oral three times a day  carvedilol 12.5 milliGRAM(s) Oral every 12 hours  chlorhexidine 2% Cloths 1 Application(s) Topical <User Schedule>  dextrose 40% Gel 15 Gram(s) Oral once PRN  dextrose 5%. 1000 milliLiter(s) IV Continuous <Continuous>  dextrose 50% Injectable 12.5 Gram(s) IV Push once  dextrose 50% Injectable 25 Gram(s) IV Push once  dextrose 50% Injectable 25 Gram(s) IV Push once  docusate sodium 100 milliGRAM(s) Oral two times a day  ferrous    sulfate 325 milliGRAM(s) Oral daily  glucagon  Injectable 1 milliGRAM(s) IntraMuscular once PRN  heparin  Injectable 5000 Unit(s) SubCutaneous every 12 hours  insulin lispro (HumaLOG) corrective regimen sliding scale   SubCutaneous three times a day before meals  insulin lispro (HumaLOG) corrective regimen sliding scale   SubCutaneous at bedtime  morphine  - Injectable 2 milliGRAM(s) IV Push every 4 hours PRN  oxyCODONE    5 mG/acetaminophen 325 mG 2 Tablet(s) Oral every 4 hours PRN  piperacillin/tazobactam IVPB. 2.25 Gram(s) IV Intermittent every 12 hours  sacubitril 24 mG/valsartan 26 mG 1 Tablet(s) Oral two times a day  senna 2 Tablet(s) Oral at bedtime PRN  sodium chloride 0.9% 1000 milliLiter(s) IV Continuous <Continuous>    SOCIAL       FAMILY   FAMILY HISTORY:  No pertinent family history in first degree relatives    REVIEW OF SYSTEMS:  CONSTITUTIONAL:  No Fever or chills  HEENT:   No diplopia or blurred vision.  No earache, sore throat or runny nose.  CARDIOVASCULAR:  No pressure, squeezing, strangling, tightness, heaviness or aching about the chest, neck, axilla or epigastrium.  RESPIRATORY:  No cough, shortness of breath, PND or orthopnea.  GASTROINTESTINAL:  No nausea, vomiting or diarrhea.  GENITOURINARY:  No dysuria, frequency or urgency. No Blood in urine  MUSCULOSKELETAL:   AS PER HPI  SKIN:  No change in skin, hair or nails.  NEUROLOGIC:  No paresthesias, fasciculations, seizures or weakness.  PSYCHIATRIC:  No disorder of thought or mood.  ENDOCRINE:  No heat or cold intolerance, polyuria or polydipsia.  HEMATOLOGICAL:  No easy bruising or bleeding.            Physical Exam:  ICU Vital Signs Last 24 Hrs  T(C): 36.7 (02 Sep 2018 10:35), Max: 37.1 (01 Sep 2018 21:00)  T(F): 98 (02 Sep 2018 10:35), Max: 98.8 (01 Sep 2018 21:00)  HR: 79 (02 Sep 2018 10:35) (78 - 84)  BP: 130/85 (02 Sep 2018 10:35) (130/85 - 160/89)  BP(mean): --  ABP: --  ABP(mean): --  RR: 18 (02 Sep 2018 10:35) (18 - 20)  SpO2: 95% (02 Sep 2018 10:35) (95% - 96%)    GEN: NAD, pleasant  HEENT: normocephalic and atraumatic. EOMI. PACO.    NECK: Supple. No carotid bruits.  No lymphadenopathy or thyromegaly.  LUNGS: Clear to auscultation.  HEART: Regular rate and rhythm without murmur.  ABDOMEN: Soft, nontender, and nondistended.  Positive bowel sounds.    : No CVA tenderness  EXTREMITIES: Without any cyanosis, clubbing, rash, lesions or edema.  MSK: no joint swelling  NEUROLOGIC: Cranial nerves II through XII are grossly intact.  PSYCHIATRIC: Appropriate affect .  SKIN: No ulceration or induration present.        Labs:      130<L>  |  93<L>  |  21.0<H>  ----------------------------<  158<H>  4.6   |  21.0<L>  |  3.18<H>    Ca    8.3<L>      02 Sep 2018 08:51  Phos  2.6         TPro  3.2<L>  /  Alb  1.6<L>  /  TBili  0.2<L>  /  DBili  x   /  AST  11  /  ALT  <5  /  AlkPhos  43                            10.1   3.4   )-----------( 219      ( 02 Sep 2018 08:51 )             33.1         Urinalysis Basic - ( 01 Sep 2018 10:13 )    Color: Yellow / Appearance: Slightly Turbid / S.010 / pH: x  Gluc: x / Ketone: Negative  / Bili: Negative / Urobili: Negative mg/dL   Blood: x / Protein: 500 mg/dL / Nitrite: Negative   Leuk Esterase: Moderate / RBC: >50 /HPF / WBC >50   Sq Epi: x / Non Sq Epi: Moderate / Bacteria: Moderate      LIVER FUNCTIONS - ( 01 Sep 2018 08:39 )  Alb: 1.6 g/dL / Pro: 3.2 g/dL / ALK PHOS: 43 U/L / ALT: <5 U/L / AST: 11 U/L / GGT: x               CAPILLARY BLOOD GLUCOSE      POCT Blood Glucose.: 169 mg/dL (02 Sep 2018 12:13)  POCT Blood Glucose.: 173 mg/dL (02 Sep 2018 08:17)  POCT Blood Glucose.: 155 mg/dL (01 Sep 2018 21:34)  POCT Blood Glucose.: 202 mg/dL (01 Sep 2018 17:18)        RECENT CULTURES:   @ 10:12 .Urine Clean Catch (Midstream)     Culture grew 3 or more types of organisms which indicate  collection contamination; consider recollection only if clinically  indicated.  .  TYPE: (C=Critical, N=Notification, A=Abnormal) N  TESTS:  _ Contaminated uine.  DATE/TIME CALLED: _ 2018 10:11:46  CALLED TO: Adrian Lo RN  READ BACK (2 Patient Identifiers)(Y/N): _ Y  READ BACK VALUES (Y/N): _ Y  CALLED BY: _ Lianna         @ 18:52 .Body Fluid Pleural Fluid            @ 13:13 .Blood Blood     No growth at 48 hours         @ 03:28 .Body Fluid Pleural Fluid     No growth at 3 days.  Culture in progress    Few WBC's  No organisms seen

## 2018-09-02 NOTE — PROGRESS NOTE ADULT - SUBJECTIVE AND OBJECTIVE BOX
INTERVAL HPI/OVERNIGHT EVENTS:    SUBJECTIVE:  No acute events overnight. Patient completed HD yesterday 18 using LUE AV graft without complications. HD- 2 Liters. Patient to keep permacath in place until at least 3 HD sessions with use of AV graft are successful. Left palpable thrill 3+ 2+ radial pulses bilaterally.     MEDICATIONS  (STANDING):  aspirin enteric coated 81 milliGRAM(s) Oral daily  calcitriol   Capsule 0.25 MICROGram(s) Oral two times a day  calcium carbonate   1250 mG (OsCal) 1 Tablet(s) Oral three times a day  carvedilol 12.5 milliGRAM(s) Oral every 12 hours  chlorhexidine 2% Cloths 1 Application(s) Topical <User Schedule>  dextrose 5%. 1000 milliLiter(s) (50 mL/Hr) IV Continuous <Continuous>  dextrose 50% Injectable 12.5 Gram(s) IV Push once  dextrose 50% Injectable 25 Gram(s) IV Push once  dextrose 50% Injectable 25 Gram(s) IV Push once  docusate sodium 100 milliGRAM(s) Oral two times a day  ferrous    sulfate 325 milliGRAM(s) Oral daily  heparin  Injectable 5000 Unit(s) SubCutaneous every 12 hours  insulin lispro (HumaLOG) corrective regimen sliding scale   SubCutaneous three times a day before meals  insulin lispro (HumaLOG) corrective regimen sliding scale   SubCutaneous at bedtime  piperacillin/tazobactam IVPB. 2.25 Gram(s) IV Intermittent every 12 hours  sacubitril 24 mG/valsartan 26 mG 1 Tablet(s) Oral two times a day  sodium chloride 0.9% 1000 milliLiter(s) (43 mL/Hr) IV Continuous <Continuous>    MEDICATIONS  (PRN):  acetaminophen   Tablet 650 milliGRAM(s) Oral every 6 hours PRN For Temp greater than 38 C (100.4 F)  acetaminophen   Tablet. 650 milliGRAM(s) Oral every 6 hours PRN Mild Pain (1 - 3)  dextrose 40% Gel 15 Gram(s) Oral once PRN Blood Glucose LESS THAN 70 milliGRAM(s)/deciliter  glucagon  Injectable 1 milliGRAM(s) IntraMuscular once PRN Glucose LESS THAN 70 milligrams/deciliter  morphine  - Injectable 2 milliGRAM(s) IV Push every 4 hours PRN Severe Pain (7 - 10)  oxyCODONE    5 mG/acetaminophen 325 mG 2 Tablet(s) Oral every 4 hours PRN Moderate Pain (4 - 6)  senna 2 Tablet(s) Oral at bedtime PRN Constipation      Vital Signs Last 24 Hrs  T(C): 37.1 (02 Sep 2018 04:07), Max: 37.1 (01 Sep 2018 21:00)  T(F): 98.7 (02 Sep 2018 04:07), Max: 98.8 (01 Sep 2018 21:00)  HR: 78 (02 Sep 2018 04:07) (75 - 84)  BP: 152/79 (02 Sep 2018 04:07) (113/68 - 160/89)  BP(mean): --  RR: 20 (02 Sep 2018 04:07) (17 - 20)  SpO2: 96% (02 Sep 2018 04:07) (93% - 98%)    PE  Gen: well appearing, no acute distress  Pulm: CTAB, nonlaboured breathing  CV: RRR, nontachycardic, R chest permacath in place, no signs of infection  Abd: soft, nt/nd  Vasc: WWP. LUE Palpable strong thrill AV graft. graft appears superficial, close to skin, easily accessible. 1+ radial pulses bilaterally. Full motor function of hand.  No numbness but pt reports subjective pins/needles at fingertips since time of operation.        I&O's Detail    01 Sep 2018 07:01  -  02 Sep 2018 07:00  --------------------------------------------------------  IN:    Oral Fluid: 720 mL    Other: 900 mL    sodium chloride 0.9%: 516 mL  Total IN: 2136 mL    OUT:    Chest Tube: 160 mL    Other: 3400 mL  Total OUT: 3560 mL    Total NET: -1424 mL          LABS:                        10.1   3.4   )-----------( 219      ( 02 Sep 2018 08:51 )             33.1     09-02    130<L>  |  93<L>  |  21.0<H>  ----------------------------<  158<H>  4.6   |  21.0<L>  |  3.18<H>    Ca    8.3<L>      02 Sep 2018 08:51  Phos  2.6     -    TPro  3.2<L>  /  Alb  1.6<L>  /  TBili  0.2<L>  /  DBili  x   /  AST  11  /  ALT  <5  /  AlkPhos  43        Urinalysis Basic - ( 01 Sep 2018 10:13 )    Color: Yellow / Appearance: Slightly Turbid / S.010 / pH: x  Gluc: x / Ketone: Negative  / Bili: Negative / Urobili: Negative mg/dL   Blood: x / Protein: 500 mg/dL / Nitrite: Negative   Leuk Esterase: Moderate / RBC: >50 /HPF / WBC >50   Sq Epi: x / Non Sq Epi: Moderate / Bacteria: Moderate        RADIOLOGY & ADDITIONAL STUDIES:

## 2018-09-03 LAB
24R-OH-CALCIDIOL SERPL-MCNC: 24.6 NG/ML — LOW (ref 30–80)
ANION GAP SERPL CALC-SCNC: 13 MMOL/L — SIGNIFICANT CHANGE UP (ref 5–17)
BUN SERPL-MCNC: 30 MG/DL — HIGH (ref 8–20)
CALCIUM SERPL-MCNC: 9.4 MG/DL — SIGNIFICANT CHANGE UP (ref 8.6–10.2)
CHLORIDE SERPL-SCNC: 94 MMOL/L — LOW (ref 98–107)
CO2 SERPL-SCNC: 25 MMOL/L — SIGNIFICANT CHANGE UP (ref 22–29)
CREAT SERPL-MCNC: 3.95 MG/DL — HIGH (ref 0.5–1.3)
GLUCOSE BLDC GLUCOMTR-MCNC: 128 MG/DL — HIGH (ref 70–99)
GLUCOSE BLDC GLUCOMTR-MCNC: 173 MG/DL — HIGH (ref 70–99)
GLUCOSE BLDC GLUCOMTR-MCNC: 214 MG/DL — HIGH (ref 70–99)
GLUCOSE BLDC GLUCOMTR-MCNC: 219 MG/DL — HIGH (ref 70–99)
GLUCOSE SERPL-MCNC: 208 MG/DL — HIGH (ref 70–115)
HCT VFR BLD CALC: 34.6 % — LOW (ref 37–47)
HGB BLD-MCNC: 10.4 G/DL — LOW (ref 12–16)
MCHC RBC-ENTMCNC: 28.2 PG — SIGNIFICANT CHANGE UP (ref 27–31)
MCHC RBC-ENTMCNC: 30.1 G/DL — LOW (ref 32–36)
MCV RBC AUTO: 93.8 FL — SIGNIFICANT CHANGE UP (ref 81–99)
PLATELET # BLD AUTO: 235 K/UL — SIGNIFICANT CHANGE UP (ref 150–400)
POTASSIUM SERPL-MCNC: 4.5 MMOL/L — SIGNIFICANT CHANGE UP (ref 3.5–5.3)
POTASSIUM SERPL-SCNC: 4.5 MMOL/L — SIGNIFICANT CHANGE UP (ref 3.5–5.3)
RBC # BLD: 3.69 M/UL — LOW (ref 4.4–5.2)
RBC # FLD: 14.3 % — SIGNIFICANT CHANGE UP (ref 11–15.6)
SODIUM SERPL-SCNC: 132 MMOL/L — LOW (ref 135–145)
VIT D25+D1,25 OH+D1,25 PNL SERPL-MCNC: 51.2 PG/ML — SIGNIFICANT CHANGE UP (ref 19.9–79.3)
WBC # BLD: 3.8 K/UL — LOW (ref 4.8–10.8)
WBC # FLD AUTO: 3.8 K/UL — LOW (ref 4.8–10.8)

## 2018-09-03 PROCEDURE — 99233 SBSQ HOSP IP/OBS HIGH 50: CPT

## 2018-09-03 PROCEDURE — 99232 SBSQ HOSP IP/OBS MODERATE 35: CPT

## 2018-09-03 RX ADMIN — Medication 81 MILLIGRAM(S): at 11:48

## 2018-09-03 RX ADMIN — Medication 4: at 11:49

## 2018-09-03 RX ADMIN — CHLORHEXIDINE GLUCONATE 1 APPLICATION(S): 213 SOLUTION TOPICAL at 06:43

## 2018-09-03 RX ADMIN — SACUBITRIL AND VALSARTAN 1 TABLET(S): 24; 26 TABLET, FILM COATED ORAL at 06:41

## 2018-09-03 RX ADMIN — CALCITRIOL 0.25 MICROGRAM(S): 0.5 CAPSULE ORAL at 17:32

## 2018-09-03 RX ADMIN — Medication 100 MILLIGRAM(S): at 06:42

## 2018-09-03 RX ADMIN — PIPERACILLIN AND TAZOBACTAM 200 GRAM(S): 4; .5 INJECTION, POWDER, LYOPHILIZED, FOR SOLUTION INTRAVENOUS at 17:32

## 2018-09-03 RX ADMIN — SACUBITRIL AND VALSARTAN 1 TABLET(S): 24; 26 TABLET, FILM COATED ORAL at 17:33

## 2018-09-03 RX ADMIN — Medication 2: at 08:28

## 2018-09-03 RX ADMIN — SODIUM CHLORIDE 43 MILLILITER(S): 9 INJECTION, SOLUTION INTRAVENOUS at 14:28

## 2018-09-03 RX ADMIN — Medication 1 TABLET(S): at 13:18

## 2018-09-03 RX ADMIN — CARVEDILOL PHOSPHATE 12.5 MILLIGRAM(S): 80 CAPSULE, EXTENDED RELEASE ORAL at 17:32

## 2018-09-03 RX ADMIN — HEPARIN SODIUM 5000 UNIT(S): 5000 INJECTION INTRAVENOUS; SUBCUTANEOUS at 06:42

## 2018-09-03 RX ADMIN — CALCITRIOL 0.25 MICROGRAM(S): 0.5 CAPSULE ORAL at 06:42

## 2018-09-03 RX ADMIN — Medication 325 MILLIGRAM(S): at 11:48

## 2018-09-03 RX ADMIN — Medication 1 TABLET(S): at 06:42

## 2018-09-03 RX ADMIN — Medication 100 MILLIGRAM(S): at 17:32

## 2018-09-03 RX ADMIN — PIPERACILLIN AND TAZOBACTAM 200 GRAM(S): 4; .5 INJECTION, POWDER, LYOPHILIZED, FOR SOLUTION INTRAVENOUS at 06:42

## 2018-09-03 RX ADMIN — HEPARIN SODIUM 5000 UNIT(S): 5000 INJECTION INTRAVENOUS; SUBCUTANEOUS at 17:33

## 2018-09-03 RX ADMIN — CARVEDILOL PHOSPHATE 12.5 MILLIGRAM(S): 80 CAPSULE, EXTENDED RELEASE ORAL at 06:42

## 2018-09-03 RX ADMIN — Medication 4: at 17:32

## 2018-09-03 RX ADMIN — Medication 1 TABLET(S): at 23:30

## 2018-09-03 NOTE — PROGRESS NOTE ADULT - ASSESSMENT
The patient is a 58 year old female with a history of ESRD on HD MWF, afib not on anticoagulation given history of GIB, chronic diastolic CHF who presented to the ER with complaints of worsening shortness of breath. CT on admission consistent with large right pleural effusion now status post pigtail placement by CTS.  Code sepsis called for fever. Started on IV zosyn, cultures ordered, ID consulted. Chest tube removed by CT surgery on 8/2. Vascular surgery consulted for permacath removal.     Assessment/Plan:    1. Sepsis secondary to right upper lobe pneumonia with possible parapneumonic effusion: IV zosyn day 3  Blood cultures x 2 ordered on 9/1  Blood cultures on 8/30 negative x 2  ID on consult  Status post chest tube removal    pleural fluid cultures negative thus far.    2. ESRD on HD MWF: Renal following.     3. Acute on chronic systolic CHF with NICM: Echocardiogram with  global hypokinesia with low ef of 25%. Spoke with cardio, cath early this  year with non obstructive CAD. Added entresto; on BB. Cardio following.     4. AFib: Rate controlled; Unable to tolerate coumadin due to GI bleed. Was being evaluated for Watchmen procedure, however patient missed appointment in office.      5. Pericardial effusion - hx of uremic pericarditis, currently stable.  No pericardial effusion on echo; colchicine discontinued.     6. Diabetes mellitus type 2: HSS> MOnitor bsl.     7. Hypocalcemia: Improved, continue ca carb and calcitriol/ Repeat BMP this morning.     VTE- Heparin Subcut

## 2018-09-03 NOTE — PROGRESS NOTE ADULT - SUBJECTIVE AND OBJECTIVE BOX
CC: Pleural effusion    INTERVAL HPI/OVERNIGHT EVENTS: Patient seen and examined, status post CT removal yesterday, some clear serous drainage at the site overnight. Comfortably sitting in a chair. Afebrile. Denies cough, sob or palpitations. Denies abdominal pain nausea or vomiting.       Vital Signs Last 24 Hrs  T(C): 36.6 (03 Sep 2018 09:20), Max: 36.9 (03 Sep 2018 04:14)  T(F): 97.8 (03 Sep 2018 09:20), Max: 98.5 (03 Sep 2018 04:14)  HR: 76 (03 Sep 2018 09:20) (75 - 89)  BP: 122/60 (03 Sep 2018 09:20) (122/60 - 139/81)  BP(mean): --  RR: 20 (03 Sep 2018 09:20) (18 - 20)  SpO2: 92% (03 Sep 2018 09:20) (92% - 96%)    PHYSICAL EXAM:    GENERAL: NAD, AOX3  HEAD:  Atraumatic, Normocephalic  EYES: EOMI, PERRLA, conjunctiva and sclera clear  ENMT: Moist mucous membranes  CHEST/LUNG: Decreased crackles right lung base  HEART: Regular rate and rhythm; No murmurs, rubs, or gallops + PErmacath   ABDOMEN: Soft, Nontender, Nondistended; Bowel sounds present  EXTREMITIES:  2+ Peripheral Pulses, No clubbing, cyanosis, or edema        MEDICATIONS  (STANDING):  aspirin enteric coated 81 milliGRAM(s) Oral daily  calcitriol   Capsule 0.25 MICROGram(s) Oral two times a day  calcium carbonate   1250 mG (OsCal) 1 Tablet(s) Oral three times a day  carvedilol 12.5 milliGRAM(s) Oral every 12 hours  chlorhexidine 2% Cloths 1 Application(s) Topical <User Schedule>  dextrose 5%. 1000 milliLiter(s) (50 mL/Hr) IV Continuous <Continuous>  dextrose 50% Injectable 12.5 Gram(s) IV Push once  dextrose 50% Injectable 25 Gram(s) IV Push once  dextrose 50% Injectable 25 Gram(s) IV Push once  docusate sodium 100 milliGRAM(s) Oral two times a day  ferrous    sulfate 325 milliGRAM(s) Oral daily  heparin  Injectable 5000 Unit(s) SubCutaneous every 12 hours  insulin lispro (HumaLOG) corrective regimen sliding scale   SubCutaneous three times a day before meals  insulin lispro (HumaLOG) corrective regimen sliding scale   SubCutaneous at bedtime  piperacillin/tazobactam IVPB. 2.25 Gram(s) IV Intermittent every 12 hours  sacubitril 24 mG/valsartan 26 mG 1 Tablet(s) Oral two times a day  sodium chloride 0.9% 1000 milliLiter(s) (43 mL/Hr) IV Continuous <Continuous>    MEDICATIONS  (PRN):  acetaminophen   Tablet 650 milliGRAM(s) Oral every 6 hours PRN For Temp greater than 38 C (100.4 F)  acetaminophen   Tablet. 650 milliGRAM(s) Oral every 6 hours PRN Mild Pain (1 - 3)  dextrose 40% Gel 15 Gram(s) Oral once PRN Blood Glucose LESS THAN 70 milliGRAM(s)/deciliter  glucagon  Injectable 1 milliGRAM(s) IntraMuscular once PRN Glucose LESS THAN 70 milligrams/deciliter  morphine  - Injectable 2 milliGRAM(s) IV Push every 4 hours PRN Severe Pain (7 - 10)  oxyCODONE    5 mG/acetaminophen 325 mG 2 Tablet(s) Oral every 4 hours PRN Moderate Pain (4 - 6)  senna 2 Tablet(s) Oral at bedtime PRN Constipation      Allergies    No Known Allergies    Intolerances          LABS:                          10.1   3.4   )-----------( 219      ( 02 Sep 2018 08:51 )             33.1     09-02    130<L>  |  93<L>  |  21.0<H>  ----------------------------<  158<H>  4.6   |  21.0<L>  |  3.18<H>    Ca    8.3<L>      02 Sep 2018 08:51  Phos  2.6     09-02            RADIOLOGY & ADDITIONAL TESTS:

## 2018-09-03 NOTE — PROGRESS NOTE ADULT - SUBJECTIVE AND OBJECTIVE BOX
Events noted ; no acute complaints       TELE: SR on tele     MEDICATIONS  (STANDING):  aspirin enteric coated 81 milliGRAM(s) Oral daily  calcitriol   Capsule 0.25 MICROGram(s) Oral two times a day  calcium carbonate   1250 mG (OsCal) 1 Tablet(s) Oral three times a day  carvedilol 12.5 milliGRAM(s) Oral every 12 hours  chlorhexidine 2% Cloths 1 Application(s) Topical <User Schedule>  dextrose 5%. 1000 milliLiter(s) (50 mL/Hr) IV Continuous <Continuous>  dextrose 50% Injectable 12.5 Gram(s) IV Push once  dextrose 50% Injectable 25 Gram(s) IV Push once  dextrose 50% Injectable 25 Gram(s) IV Push once  docusate sodium 100 milliGRAM(s) Oral two times a day  ferrous    sulfate 325 milliGRAM(s) Oral daily  heparin  Injectable 5000 Unit(s) SubCutaneous every 12 hours  insulin lispro (HumaLOG) corrective regimen sliding scale   SubCutaneous three times a day before meals  insulin lispro (HumaLOG) corrective regimen sliding scale   SubCutaneous at bedtime  piperacillin/tazobactam IVPB. 2.25 Gram(s) IV Intermittent every 12 hours  sacubitril 24 mG/valsartan 26 mG 1 Tablet(s) Oral two times a day  sodium chloride 0.9% 1000 milliLiter(s) (43 mL/Hr) IV Continuous <Continuous>    MEDICATIONS  (PRN):  acetaminophen   Tablet 650 milliGRAM(s) Oral every 6 hours PRN For Temp greater than 38 C (100.4 F)  acetaminophen   Tablet. 650 milliGRAM(s) Oral every 6 hours PRN Mild Pain (1 - 3)  dextrose 40% Gel 15 Gram(s) Oral once PRN Blood Glucose LESS THAN 70 milliGRAM(s)/deciliter  glucagon  Injectable 1 milliGRAM(s) IntraMuscular once PRN Glucose LESS THAN 70 milligrams/deciliter  morphine  - Injectable 2 milliGRAM(s) IV Push every 4 hours PRN Severe Pain (7 - 10)  oxyCODONE    5 mG/acetaminophen 325 mG 2 Tablet(s) Oral every 4 hours PRN Moderate Pain (4 - 6)  senna 2 Tablet(s) Oral at bedtime PRN Constipation      Allergies    No Known Allergies    Intolerances      PAST MEDICAL & SURGICAL HISTORY:  Pleural effusion  Pericardial effusion  End stage renal disease on dialysis  HLD (hyperlipidemia)  HTN (hypertension)  DM (diabetes mellitus)  A-V fistula  Encounter for dialysis catheter care      Vital Signs Last 24 Hrs  T(C): 36.6 (03 Sep 2018 09:20), Max: 36.9 (03 Sep 2018 04:14)  T(F): 97.8 (03 Sep 2018 09:20), Max: 98.5 (03 Sep 2018 04:14)  HR: 76 (03 Sep 2018 09:20) (75 - 89)  BP: 122/60 (03 Sep 2018 09:20) (122/60 - 139/81)  BP(mean): --  RR: 20 (03 Sep 2018 09:20) (18 - 20)  SpO2: 92% (03 Sep 2018 09:20) (92% - 96%)    Physical Exam:  Constitutional: NAD, AAOx3  Cardiovascular: +S1S2 RRR  Pulmonary: CTA b/l, unlabored  Abd: soft NTND +BS  Groins: C/D/I bilaterally; no bleeding, hematoma, edema  Extremities: no pedal edema, +distal pulses b/l  Neuro: non focal, KWONG x4    LABS:                        10.1   3.4   )-----------( 219      ( 02 Sep 2018 08:51 )             33.1     09-02    130<L>  |  93<L>  |  21.0<H>  ----------------------------<  158<H>  4.6   |  21.0<L>  |  3.18<H>    Ca    8.3<L>      02 Sep 2018 08:51  Phos  2.6     09-02        Urinalysis Basic - ( 01 Sep 2018 10:13 )    Color: Yellow / Appearance: Slightly Turbid / S.010 / pH: x  Gluc: x / Ketone: Negative  / Bili: Negative / Urobili: Negative mg/dL   Blood: x / Protein: 500 mg/dL / Nitrite: Negative   Leuk Esterase: Moderate / RBC: >50 /HPF / WBC >50   Sq Epi: x / Non Sq Epi: Moderate / Bacteria: Moderate

## 2018-09-03 NOTE — PROGRESS NOTE ADULT - ASSESSMENT
58 year old female esrd on hd, history of pericardial effusion, dm, a fib not on anticoagulation secondary to gi bleed, anemia, Pubic rami fracture, anemia. Patient was in her usual state of poor health when she became severely short of breath in hemo-dialysis. She was sent to the er where she was found to have a large pleural effusion and a chest tube was placed and the fluid was sent for blood culture.   PLEURAL FLUID CX NEGATIVE   BLOOD CX SO FAR NEG  APPEARS CLINICALLY IMPROVED OOB TO CHAIR NO TOXIC   CONTINUE ZOSYN  FOR NOW  WILL FOLLOW UP

## 2018-09-03 NOTE — PROGRESS NOTE ADULT - SUBJECTIVE AND OBJECTIVE BOX
[facility]  INFECTIOUS DISEASES AND INTERNAL MEDICINE at [place]  =======================================================  Jose Carlos Servin MD  Diplomates American Board of Internal Medicine and Infectious Diseases  =======================================================    JAROCHO MORALES [id number]    Follow up: pneumonia     Allergies:  No Known Allergies      Medications:  acetaminophen   Tablet 650 milliGRAM(s) Oral every 6 hours PRN  acetaminophen   Tablet. 650 milliGRAM(s) Oral every 6 hours PRN  aspirin enteric coated 81 milliGRAM(s) Oral daily  calcitriol   Capsule 0.25 MICROGram(s) Oral two times a day  calcium carbonate   1250 mG (OsCal) 1 Tablet(s) Oral three times a day  carvedilol 12.5 milliGRAM(s) Oral every 12 hours  chlorhexidine 2% Cloths 1 Application(s) Topical <User Schedule>  dextrose 40% Gel 15 Gram(s) Oral once PRN  dextrose 5%. 1000 milliLiter(s) IV Continuous <Continuous>  dextrose 50% Injectable 12.5 Gram(s) IV Push once  dextrose 50% Injectable 25 Gram(s) IV Push once  dextrose 50% Injectable 25 Gram(s) IV Push once  docusate sodium 100 milliGRAM(s) Oral two times a day  ferrous    sulfate 325 milliGRAM(s) Oral daily  glucagon  Injectable 1 milliGRAM(s) IntraMuscular once PRN  heparin  Injectable 5000 Unit(s) SubCutaneous every 12 hours  insulin lispro (HumaLOG) corrective regimen sliding scale   SubCutaneous three times a day before meals  insulin lispro (HumaLOG) corrective regimen sliding scale   SubCutaneous at bedtime  morphine  - Injectable 2 milliGRAM(s) IV Push every 4 hours PRN  oxyCODONE    5 mG/acetaminophen 325 mG 2 Tablet(s) Oral every 4 hours PRN  piperacillin/tazobactam IVPB. 2.25 Gram(s) IV Intermittent every 12 hours  sacubitril 24 mG/valsartan 26 mG 1 Tablet(s) Oral two times a day  senna 2 Tablet(s) Oral at bedtime PRN  sodium chloride 0.9% 1000 milliLiter(s) IV Continuous <Continuous>    SOCIAL       FAMILY   FAMILY HISTORY:  No pertinent family history in first degree relatives    REVIEW OF SYSTEMS:  CONSTITUTIONAL:  No Fever or chills  HEENT:   No diplopia or blurred vision.  No earache, sore throat or runny nose.  CARDIOVASCULAR:  No pressure, squeezing, strangling, tightness, heaviness or aching about the chest, neck, axilla or epigastrium.  RESPIRATORY:  No cough, shortness of breath, PND or orthopnea.  GASTROINTESTINAL:  No nausea, vomiting or diarrhea.  GENITOURINARY:  No dysuria, frequency or urgency. No Blood in urine  MUSCULOSKELETAL:   AS PER HPI  SKIN:  No change in skin, hair or nails.  NEUROLOGIC:  No paresthesias, fasciculations, seizures or weakness.  PSYCHIATRIC:  No disorder of thought or mood.  ENDOCRINE:  No heat or cold intolerance, polyuria or polydipsia.  HEMATOLOGICAL:  No easy bruising or bleeding.            Physical Exam:   Vital Signs Last 24 Hrs  T(C): 36.6 (03 Sep 2018 09:20), Max: 36.9 (03 Sep 2018 04:14)  T(F): 97.8 (03 Sep 2018 09:20), Max: 98.5 (03 Sep 2018 04:14)  HR: 76 (03 Sep 2018 09:20) (75 - 89)  BP: 122/60 (03 Sep 2018 09:20) (122/60 - 139/81)  BP(mean): --  RR: 20 (03 Sep 2018 09:20) (18 - 20)  SpO2: 92% (03 Sep 2018 09:20) (92% - 96%)      GEN: NAD, pleasant  HEENT: normocephalic and atraumatic. EOMI. PACO.    NECK: Supple. No carotid bruits.  No lymphadenopathy or thyromegaly.  LUNGS: COARSE BREATH SOUNDS   HEART: Regular rate and rhythm without murmur.  ABDOMEN: Soft, nontender, and nondistended.  Positive bowel sounds.    : No CVA tenderness  EXTREMITIES: Without any cyanosis, clubbing, rash, lesions or edema.  MSK: no joint swelling  NEUROLOGIC: Cranial nerves II through XII are grossly intact.  PSYCHIATRIC: Appropriate affect .  SKIN: No ulceration or induration present.        Labs:                         10.1   3.4   )-----------( 219      ( 02 Sep 2018 08:51 )             33.1   09-02    130<L>  |  93<L>  |  21.0<H>  ----------------------------<  158<H>  4.6   |  21.0<L>  |  3.18<H>    Ca    8.3<L>      02 Sep 2018 08:51  Phos  2.6     09-02        RECENT CULTURES:  09-01 @ 10:12 .Urine Clean Catch (Midstream)     Culture grew 3 or more types of organisms which indicate  collection contamination; consider recollection only if clinically  indicated.  .  TYPE: (C=Critical, N=Notification, A=Abnormal) N  TESTS:  _ Contaminated uine.  DATE/TIME CALLED: _ 09/02/2018 10:11:46  CALLED TO: _ Leandra Lo RN  READ BACK (2 Patient Identifiers)(Y/N): _ Y  READ BACK VALUES (Y/N): _ Y  CALLED BY: Adrian Dsouza        08-30 @ 18:52 .Body Fluid Pleural Fluid           08-30 @ 13:13 .Blood Blood     No growth at 48 hours        08-30 @ 03:28 .Body Fluid Pleural Fluid     No growth at 3 days.  Culture in progress    Few WBC's  No organisms seen

## 2018-09-03 NOTE — PROGRESS NOTE ADULT - ASSESSMENT
59 y/o F PMHx of NICM, HFrEF,  TTE now with LVEF 20-25% which is worse than prior 40-45% , Afib not on AC due to GI bleed, pleural effusion, pericardial effusion, ESRD MWF HD, anemia, and pubic rami fracture transferred from HD facility due to SOB. Found to have worsening right pleural effusion s/p Pigtail catheter, and CHF with worsening LV function. nO acute issues overnight doing well.    1. NICMP/CHF. Started on Entresto which she is tolerating so far. Remains on coreg continue HD with fluid removal. 1  2.  Paroxysmal Afib- currently in SR, rates controlled.  Unable to tolerate coumadin due to GI bleed. Outpts followup for consideration of Watchman procedure. Can consider GERALD for anatomy evaluation if remains in house.   3. Pericardial effusion - hx of uremic pericarditis, currently stable. Now off colchicine.   4. Pleural Effusion bilateral, R> L - s/p thoracentesis of right, PleurX catheter on right.    5. Hypertension - well controlled on current regimen  6. Fever- infectious workup ongoing.     Dr. Salazar to resume care Tuesday. Call with any questions

## 2018-09-03 NOTE — PROGRESS NOTE ADULT - SUBJECTIVE AND OBJECTIVE BOX
Patient seen and examined    I&O's Summary    02 Sep 2018 07:01  -  03 Sep 2018 07:00  --------------------------------------------------------  IN: 1363 mL / OUT: 0 mL / NET: 1363 mL    03 Sep 2018 07:01  -  03 Sep 2018 17:58  --------------------------------------------------------  IN: 530 mL / OUT: 0 mL / NET: 530 mL    oob/ch  Feels much better    REVIEW OF SYSTEMS:    CONSTITUTIONAL: No F/C  RESPIRATORY: No cough,  No SOB  CARDIOVASCULAR: No CP/palpitations,    GASTROINTESTINAL: No abdominal pain  or NVD   GENITOURINARY: No UTI sx  NEUROLOGICAL: No headaches, NO wk/numbness  MUSCULOSKELETAL:   EXTREMITIES : no swelling,    Vital Signs Last 24 Hrs  T(C): 36.6 (03 Sep 2018 16:03), Max: 36.9 (03 Sep 2018 04:14)  T(F): 97.8 (03 Sep 2018 16:03), Max: 98.5 (03 Sep 2018 04:14)  HR: 74 (03 Sep 2018 16:03) (74 - 76)  BP: 134/84 (03 Sep 2018 16:03) (122/60 - 139/81)  BP(mean): --  RR: 20 (03 Sep 2018 16:03) (18 - 20)  SpO2: 94% (03 Sep 2018 16:03) (92% - 94%)    PHYSICAL EXAM:    GENERAL: NAD,   EYES:  conjunctiva and sclera clear  NECK: Supple, No JVD/Bruit  NERVOUS SYSTEM:  A/O x3,   CHEST:  few rales no rhonchi BS poor at bases; CT out  HEART:  RRR, No murmur  ABDOMEN: Soft, NT/ND BS+  EXTREMITIES:  mild Edema;  SKIN: No rashes    LABS:                          10.4   3.8   )-----------( 235      ( 03 Sep 2018 10:30 )             34.6     09-03    132<L>  |  94<L>  |  30.0<H>  ----------------------------<  208<H>  4.5   |  25.0  |  3.95<H>    Ca    9.4      03 Sep 2018 10:30  Phos  2.6     09-02        MEDICATIONS  (STANDING):  acetaminophen   Tablet PRN  acetaminophen   Tablet. PRN  aspirin enteric coated  calcitriol   Capsule  calcium carbonate   1250 mG (OsCal)  carvedilol  chlorhexidine 2% Cloths  dextrose 40% Gel PRN  dextrose 5%.  dextrose 50% Injectable  dextrose 50% Injectable  dextrose 50% Injectable  docusate sodium  ferrous    sulfate  glucagon  Injectable PRN  heparin  Injectable  insulin lispro (HumaLOG) corrective regimen sliding scale  insulin lispro (HumaLOG) corrective regimen sliding scale  morphine  - Injectable PRN  oxyCODONE    5 mG/acetaminophen 325 mG PRN  piperacillin/tazobactam IVPB.  sacubitril 24 mG/valsartan 26 mG  senna PRN  sodium chloride 0.9%

## 2018-09-03 NOTE — PROGRESS NOTE ADULT - SUBJECTIVE AND OBJECTIVE BOX
INTERVAL HPI/OVERNIGHT EVENTS:    SUBJECTIVE:  No acute events overnight.  Hemodialysis was successful over the weekend on AVG.  Pt seen at bedside resting.  No fevers/chills/tachy reported overnight.  Blood cultures preliminary show no growth.  Pt to have RIJ permacath removed sometime this week.      MEDICATIONS  (STANDING):  aspirin enteric coated 81 milliGRAM(s) Oral daily  calcitriol   Capsule 0.25 MICROGram(s) Oral two times a day  calcium carbonate   1250 mG (OsCal) 1 Tablet(s) Oral three times a day  carvedilol 12.5 milliGRAM(s) Oral every 12 hours  chlorhexidine 2% Cloths 1 Application(s) Topical <User Schedule>  dextrose 5%. 1000 milliLiter(s) (50 mL/Hr) IV Continuous <Continuous>  dextrose 50% Injectable 12.5 Gram(s) IV Push once  dextrose 50% Injectable 25 Gram(s) IV Push once  dextrose 50% Injectable 25 Gram(s) IV Push once  docusate sodium 100 milliGRAM(s) Oral two times a day  ferrous    sulfate 325 milliGRAM(s) Oral daily  heparin  Injectable 5000 Unit(s) SubCutaneous every 12 hours  insulin lispro (HumaLOG) corrective regimen sliding scale   SubCutaneous three times a day before meals  insulin lispro (HumaLOG) corrective regimen sliding scale   SubCutaneous at bedtime  piperacillin/tazobactam IVPB. 2.25 Gram(s) IV Intermittent every 12 hours  sacubitril 24 mG/valsartan 26 mG 1 Tablet(s) Oral two times a day  sodium chloride 0.9% 1000 milliLiter(s) (43 mL/Hr) IV Continuous <Continuous>    MEDICATIONS  (PRN):  acetaminophen   Tablet 650 milliGRAM(s) Oral every 6 hours PRN For Temp greater than 38 C (100.4 F)  acetaminophen   Tablet. 650 milliGRAM(s) Oral every 6 hours PRN Mild Pain (1 - 3)  dextrose 40% Gel 15 Gram(s) Oral once PRN Blood Glucose LESS THAN 70 milliGRAM(s)/deciliter  glucagon  Injectable 1 milliGRAM(s) IntraMuscular once PRN Glucose LESS THAN 70 milligrams/deciliter  morphine  - Injectable 2 milliGRAM(s) IV Push every 4 hours PRN Severe Pain (7 - 10)  oxyCODONE    5 mG/acetaminophen 325 mG 2 Tablet(s) Oral every 4 hours PRN Moderate Pain (4 - 6)  senna 2 Tablet(s) Oral at bedtime PRN Constipation      Vital Signs Last 24 Hrs  T(C): 36.9 (03 Sep 2018 04:14), Max: 36.9 (03 Sep 2018 04:14)  T(F): 98.5 (03 Sep 2018 04:14), Max: 98.5 (03 Sep 2018 04:14)  HR: 75 (03 Sep 2018 04:14) (75 - 89)  BP: 139/81 (03 Sep 2018 04:14) (130/85 - 139/81)  BP(mean): --  RR: 18 (03 Sep 2018 04:14) (18 - 18)  SpO2: 96% (02 Sep 2018 17:11) (95% - 96%)    PE  Gen: NAD  Pulm: CTAB/L  CV: RRR, nontachycardic, R chest permacath in place, no erythema or discharge  Vasc: WWP. LUE Palpable strong thrill AV graft. graft appears superficial, close to skin, easily accessible. 2+ radial pulses bilaterally. Full motor function of hand.  Sensation intact      I&O's Detail    02 Sep 2018 07:01  -  03 Sep 2018 07:00  --------------------------------------------------------  IN:    Oral Fluid: 360 mL    sodium chloride 0.9%: 903 mL    Solution: 100 mL  Total IN: 1363 mL    OUT:  Total OUT: 0 mL    Total NET: 1363 mL          LABS:                        10.1   3.4   )-----------( 219      ( 02 Sep 2018 08:51 )             33.1     09-02    130<L>  |  93<L>  |  21.0<H>  ----------------------------<  158<H>  4.6   |  21.0<L>  |  3.18<H>    Ca    8.3<L>      02 Sep 2018 08:51  Phos  2.6     09-02    TPro  3.2<L>  /  Alb  1.6<L>  /  TBili  0.2<L>  /  DBili  x   /  AST  11  /  ALT  <5  /  AlkPhos  43  09-01      Urinalysis Basic - ( 01 Sep 2018 10:13 )    Color: Yellow / Appearance: Slightly Turbid / S.010 / pH: x  Gluc: x / Ketone: Negative  / Bili: Negative / Urobili: Negative mg/dL   Blood: x / Protein: 500 mg/dL / Nitrite: Negative   Leuk Esterase: Moderate / RBC: >50 /HPF / WBC >50   Sq Epi: x / Non Sq Epi: Moderate / Bacteria: Moderate        RADIOLOGY & ADDITIONAL STUDIES:

## 2018-09-04 LAB
CULTURE RESULTS: NO GROWTH — SIGNIFICANT CHANGE UP
CULTURE RESULTS: SIGNIFICANT CHANGE UP
GLUCOSE BLDC GLUCOMTR-MCNC: 133 MG/DL — HIGH (ref 70–99)
GLUCOSE BLDC GLUCOMTR-MCNC: 177 MG/DL — HIGH (ref 70–99)
GLUCOSE BLDC GLUCOMTR-MCNC: 179 MG/DL — HIGH (ref 70–99)
GLUCOSE BLDC GLUCOMTR-MCNC: 96 MG/DL — SIGNIFICANT CHANGE UP (ref 70–99)
NON-GYNECOLOGICAL CYTOLOGY STUDY: SIGNIFICANT CHANGE UP
SPECIMEN SOURCE: SIGNIFICANT CHANGE UP

## 2018-09-04 PROCEDURE — 99232 SBSQ HOSP IP/OBS MODERATE 35: CPT

## 2018-09-04 RX ADMIN — PIPERACILLIN AND TAZOBACTAM 200 GRAM(S): 4; .5 INJECTION, POWDER, LYOPHILIZED, FOR SOLUTION INTRAVENOUS at 06:34

## 2018-09-04 RX ADMIN — SACUBITRIL AND VALSARTAN 1 TABLET(S): 24; 26 TABLET, FILM COATED ORAL at 06:35

## 2018-09-04 RX ADMIN — SACUBITRIL AND VALSARTAN 1 TABLET(S): 24; 26 TABLET, FILM COATED ORAL at 18:18

## 2018-09-04 RX ADMIN — Medication 1 TABLET(S): at 21:29

## 2018-09-04 RX ADMIN — CARVEDILOL PHOSPHATE 12.5 MILLIGRAM(S): 80 CAPSULE, EXTENDED RELEASE ORAL at 06:34

## 2018-09-04 RX ADMIN — CALCITRIOL 0.25 MICROGRAM(S): 0.5 CAPSULE ORAL at 18:18

## 2018-09-04 RX ADMIN — CARVEDILOL PHOSPHATE 12.5 MILLIGRAM(S): 80 CAPSULE, EXTENDED RELEASE ORAL at 18:18

## 2018-09-04 RX ADMIN — Medication 81 MILLIGRAM(S): at 18:18

## 2018-09-04 RX ADMIN — CHLORHEXIDINE GLUCONATE 1 APPLICATION(S): 213 SOLUTION TOPICAL at 06:35

## 2018-09-04 RX ADMIN — PIPERACILLIN AND TAZOBACTAM 200 GRAM(S): 4; .5 INJECTION, POWDER, LYOPHILIZED, FOR SOLUTION INTRAVENOUS at 18:19

## 2018-09-04 RX ADMIN — HEPARIN SODIUM 5000 UNIT(S): 5000 INJECTION INTRAVENOUS; SUBCUTANEOUS at 06:34

## 2018-09-04 RX ADMIN — Medication 650 MILLIGRAM(S): at 11:19

## 2018-09-04 RX ADMIN — HEPARIN SODIUM 5000 UNIT(S): 5000 INJECTION INTRAVENOUS; SUBCUTANEOUS at 18:19

## 2018-09-04 RX ADMIN — Medication 2: at 12:14

## 2018-09-04 RX ADMIN — SODIUM CHLORIDE 43 MILLILITER(S): 9 INJECTION, SOLUTION INTRAVENOUS at 18:24

## 2018-09-04 RX ADMIN — Medication 1 TABLET(S): at 06:34

## 2018-09-04 RX ADMIN — CALCITRIOL 0.25 MICROGRAM(S): 0.5 CAPSULE ORAL at 06:34

## 2018-09-04 RX ADMIN — Medication 650 MILLIGRAM(S): at 10:19

## 2018-09-04 RX ADMIN — Medication 100 MILLIGRAM(S): at 18:19

## 2018-09-04 RX ADMIN — Medication 100 MILLIGRAM(S): at 06:34

## 2018-09-04 RX ADMIN — Medication 325 MILLIGRAM(S): at 18:18

## 2018-09-04 NOTE — PROGRESS NOTE ADULT - SUBJECTIVE AND OBJECTIVE BOX
[facility]  INFECTIOUS DISEASES AND INTERNAL MEDICINE at [place]  =======================================================  Jose Carlos Servin MD  Diplomates American Board of Internal Medicine and Infectious Diseases  =======================================================    JAROCHO MORALES [id number]    Follow up:   57 y/o woman with ESRD on HD, history of pericardial effusion, DM, A-fib not on anticoagulation secondary to GI bleed, anemia, Pubic rami fracture and anemia was admitted with SOB during HD, in ED was found with a large pleural effusion and a chest tube was placed. Now s/p removal of tube, on zosyn with good response. cultures neg. repeat CXR with improvement in lung infiltration and pleural effusion.     Allergies:  No Known Allergies    Antibiotics;  Zosyn    REVIEW OF SYSTEMS:  CONSTITUTIONAL:  No Fever or chills, weak  HEENT:   No diplopia or blurred vision.  No earache, sore throat or runny nose.  CARDIOVASCULAR:  No pressure, squeezing, strangling, tightness, heaviness or aching about the chest, neck, axilla or epigastrium.  RESPIRATORY:  + cough, mild shortness of breath  GASTROINTESTINAL:  No nausea, vomiting or diarrhea.  GENITOURINARY:  No dysuria, frequency or urgency. No Blood in urine  MUSCULOSKELETAL:   AS PER HPI  SKIN:  No change in skin, hair or nails.  NEUROLOGIC:  No paresthesias, fasciculations, seizures or weakness.  PSYCHIATRIC:  No disorder of thought or mood.  ENDOCRINE:  No heat or cold intolerance, polyuria or polydipsia.  HEMATOLOGICAL:  No easy bruising or bleeding.      Physical Exam:  Vital Signs Last 24 Hrs  T(C): 36.6 (04 Sep 2018 04:45), Max: 36.6 (03 Sep 2018 16:03)  T(F): 97.9 (04 Sep 2018 04:45), Max: 97.9 (04 Sep 2018 04:45)  HR: 73 (04 Sep 2018 04:45) (73 - 74)  BP: 156/89 (04 Sep 2018 04:45) (125/73 - 156/89)  RR: 19 (04 Sep 2018 04:45) (19 - 20)  SpO2: 94% (03 Sep 2018 16:03) (94% - 94%)  GEN: NAD, pleasant  HEENT: normocephalic and atraumatic. EOMI. PACO.    NECK: Supple. No carotid bruits.  No lymphadenopathy or thyromegaly.  LUNGS: decreased BS in both bases and some coarse rhonchi bilaterally    HEART: Regular rate and rhythm without murmur.  ABDOMEN: Soft, nontender, and nondistended.  Positive bowel sounds.    : No CVA tenderness  EXTREMITIES: Without any cyanosis, clubbing, rash, lesions or edema.  MSK: no joint swelling  NEUROLOGIC: grossly intact.  PSYCHIATRIC: Appropriate affect .  SKIN: No ulceration or induration present.    Labs:  09-03    132<L>  |  94<L>  |  30.0<H>  ----------------------------<  208<H>  4.5   |  25.0  |  3.95<H>    Ca    9.4      03 Sep 2018 10:3                        10.4   3.8   )-----------( 235      ( 03 Sep 2018 10:30 )             34.6     RECENT CULTURES:  09-01 @ 10:12 .Urine Clean Catch (Midstream)     Culture grew 3 or more types of organisms which indicate  collection contamination; consider recollection only if clinically  indicated.    09-01 @ 08:39 .Blood Blood-Peripheral     No growth at 48 hours    08-30 @ 18:52 .Body Fluid Pleural Fluid       08-30 @ 13:13 .Blood Blood     No growth at 48 hours    08-30 @ 03:28 .Body Fluid Pleural Fluid     No growth at 5 days.    Few WBC's  No organisms seen

## 2018-09-04 NOTE — PROGRESS NOTE ADULT - ASSESSMENT
The patient is a 58 year old female with a history of ESRD on HD MWF, afib not on anticoagulation given history of GIB, chronic diastolic CHF who presented to the ER with complaints of worsening shortness of breath. CT on admission consistent with large right pleural effusion now status post pigtail placement by CTS.  Code sepsis called for fever. Started on IV zosyn, cultures ordered, ID consulted. Chest tube removed by CT surgery on 8/2. Vascular surgery consulted for permacath removal.     Assessment/Plan:    1. Sepsis secondary to right upper lobe pneumonia with possible parapneumonic effusion: IV zosyn day 3  Blood cultures x 2 ordered on 9/1  Blood cultures on 8/30 negative x 2  ID on consult; continue abx for a total of 7 days of zosyn  Status post chest tube removal on 9/2  pleural fluid cultures and acid fast staining negative thus far.    2. ESRD on HD MWF: Renal following.     3. Acute on chronic systolic CHF with NICM: Echocardiogram with  global hypokinesia with low ef of 25%. Spoke with cardio, cath early this  year with non obstructive CAD. Added entresto; on BB.     4. AFib: Rate controlled; Unable to tolerate coumadin due to GI bleed. Was being evaluated for Watchmen procedure, however patient missed appointment in office.    5. Pericardial effusion - hx of uremic pericarditis, currently stable.  No pericardial effusion on echo; colchicine discontinued.    6. Diabetes mellitus type 2: HSS> MOnitor bsl.    7. Hypocalcemia: Improved, continue ca carb and calcitriol/ Repeat BMP this morning.    VTE- Heparin Subcut The patient is a 58 year old female with a history of ESRD on HD MWF, afib not on anticoagulation given history of GIB, chronic diastolic CHF who presented to the ER with complaints of worsening shortness of breath. CT on admission consistent with large right pleural effusion now status post pigtail placement by CTS.  Code sepsis called for fever. Started on IV zosyn, cultures ordered, ID consulted. Chest tube removed by CT surgery on 8/2. Vascular surgery consulted for permacath removal.     Assessment/Plan:    1. Sepsis secondary to right upper lobe pneumonia with possible parapneumonic effusion: IV zosyn day 3  Blood cultures x 2 ordered on 9/1  Blood cultures on 8/30 negative x 2  ID on consult; continue abx for a total of 7 days of zosyn  Status post chest tube removal on 9/2  pleural fluid cultures and acid fast staining negative thus far.    2. ESRD on HD MWF: Renal following. Possibly removal of permacath in chest wall with vascular given that her AVF is functioning and she used it for dialysis on 9/1.    3. Acute on chronic systolic CHF with NICM: Echocardiogram with  global hypokinesia with low ef of 25%. Spoke with cardio, cath early this  year with non obstructive CAD. Added entresto; on BB.     4. AFib: Rate controlled; Unable to tolerate coumadin due to GI bleed. Was being evaluated for Watchmen procedure, however patient missed appointment in office.    5. Pericardial effusion - hx of uremic pericarditis, currently stable.  No pericardial effusion on echo; colchicine discontinued.    6. Diabetes mellitus type 2: HSS> MOnitor bsl.    7. Hypocalcemia: Improved, continue ca carb and calcitriol/ Repeat BMP this morning.    VTE- Heparin Subcut

## 2018-09-04 NOTE — PROGRESS NOTE ADULT - SUBJECTIVE AND OBJECTIVE BOX
CC: Pleural effusion    INTERVAL HPI/OVERNIGHT EVENTS: No complaints this morning, denies pain. Ambulating with PT.    ICU Vital Signs Last 24 Hrs  T(C): 36.4 (04 Sep 2018 12:12), Max: 36.6 (03 Sep 2018 16:03)  T(F): 97.6 (04 Sep 2018 12:12), Max: 97.9 (04 Sep 2018 04:45)  HR: 76 (04 Sep 2018 12:12) (73 - 76)  BP: 148/86 (04 Sep 2018 12:12) (125/73 - 156/89)  BP(mean): --  ABP: --  ABP(mean): --  RR: 18 (04 Sep 2018 12:12) (18 - 20)  SpO2: 98% (04 Sep 2018 12:12) (94% - 98%)    PHYSICAL EXAM:  GENERAL: NAD, AOX3  HEAD:  Atraumatic, Normocephalic  EYES: EOMI, PERRLA, conjunctiva and sclera clear  ENMT: Moist mucous membranes  CHEST/LUNG: Decreased crackles right lung base  HEART: Regular rate and rhythm; No murmurs, rubs, or gallops + PErmacath   ABDOMEN: Soft, Nontender, Nondistended; Bowel sounds present  EXTREMITIES:  2+ Peripheral Pulses, No clubbing, cyanosis, or edema    MEDICATIONS  (STANDING):  aspirin enteric coated 81 milliGRAM(s) Oral daily  calcitriol   Capsule 0.25 MICROGram(s) Oral two times a day  calcium carbonate   1250 mG (OsCal) 1 Tablet(s) Oral three times a day  carvedilol 12.5 milliGRAM(s) Oral every 12 hours  chlorhexidine 2% Cloths 1 Application(s) Topical <User Schedule>  dextrose 5%. 1000 milliLiter(s) (50 mL/Hr) IV Continuous <Continuous>  dextrose 50% Injectable 12.5 Gram(s) IV Push once  dextrose 50% Injectable 25 Gram(s) IV Push once  dextrose 50% Injectable 25 Gram(s) IV Push once  docusate sodium 100 milliGRAM(s) Oral two times a day  ferrous    sulfate 325 milliGRAM(s) Oral daily  heparin  Injectable 5000 Unit(s) SubCutaneous every 12 hours  insulin lispro (HumaLOG) corrective regimen sliding scale   SubCutaneous three times a day before meals  insulin lispro (HumaLOG) corrective regimen sliding scale   SubCutaneous at bedtime  piperacillin/tazobactam IVPB. 2.25 Gram(s) IV Intermittent every 12 hours  sacubitril 24 mG/valsartan 26 mG 1 Tablet(s) Oral two times a day  sodium chloride 0.9% 1000 milliLiter(s) (43 mL/Hr) IV Continuous <Continuous>    MEDICATIONS  (PRN):  acetaminophen   Tablet 650 milliGRAM(s) Oral every 6 hours PRN For Temp greater than 38 C (100.4 F)  acetaminophen   Tablet. 650 milliGRAM(s) Oral every 6 hours PRN Mild Pain (1 - 3)  dextrose 40% Gel 15 Gram(s) Oral once PRN Blood Glucose LESS THAN 70 milliGRAM(s)/deciliter  glucagon  Injectable 1 milliGRAM(s) IntraMuscular once PRN Glucose LESS THAN 70 milligrams/deciliter  morphine  - Injectable 2 milliGRAM(s) IV Push every 4 hours PRN Severe Pain (7 - 10)  oxyCODONE    5 mG/acetaminophen 325 mG 2 Tablet(s) Oral every 4 hours PRN Moderate Pain (4 - 6)  senna 2 Tablet(s) Oral at bedtime PRN Constipation    LABS:  09-03    132<L>  |  94<L>  |  30.0<H>  ----------------------------<  208<H>  4.5   |  25.0  |  3.95<H>    Ca    9.4      03 Sep 2018 10:30                       10.4   3.8   )-----------( 235      ( 03 Sep 2018 10:30 )             34.6       RADIOLOGY & ADDITIONAL TESTS:  < from: Xray Chest 1 View- PORTABLE-Urgent (09.02.18 @ 12:57) >  Impression:improvement in BILATERAL lower lobe infiltrates and   atelectasis with small BILATERAL pleural effusions. RIGHT pigtail   catheter has been removed    < end of copied text >

## 2018-09-04 NOTE — PROGRESS NOTE ADULT - SUBJECTIVE AND OBJECTIVE BOX
Patient seen and examined    I&O's Summary    03 Sep 2018 07:01  -  04 Sep 2018 07:00  --------------------------------------------------------  IN: 933 mL / OUT: 0 mL / NET: 933 mL    OOb/Ch, better    REVIEW OF SYSTEMS:    CONSTITUTIONAL: No F/C  RESPIRATORY: No cough,  No SOB  CARDIOVASCULAR: No CP/palpitations,    GASTROINTESTINAL: No abdominal pain  or NVD   GENITOURINARY: No UTI sx  NEUROLOGICAL: No headaches, NO wk/numbness  MUSCULOSKELETAL:   EXTREMITIES : no swelling,    Vital Signs Last 24 Hrs  T(C): 36.4 (04 Sep 2018 13:14), Max: 36.6 (03 Sep 2018 22:00)  T(F): 97.5 (04 Sep 2018 13:14), Max: 97.9 (04 Sep 2018 04:45)  HR: 71 (04 Sep 2018 13:14) (71 - 76)  BP: 145/82 (04 Sep 2018 13:14) (125/73 - 156/89)  BP(mean): --  RR: 18 (04 Sep 2018 13:14) (18 - 20)  SpO2: 95% (04 Sep 2018 13:14) (95% - 98%)    PHYSICAL EXAM:    GENERAL: NAD,   EYES:  conjunctiva and sclera clear  NECK: Supple, No JVD/Bruit  NERVOUS SYSTEM:  A/O x3,   CHEST:  R base rales occ rhonchi, poor BS bases  HEART:  RRR, gr 2 murmur  ABDOMEN: Soft, NT/ND BS+  EXTREMITIES:  + Edema;  SKIN: No rashes    LABS:                          10.4   3.8   )-----------( 235      ( 03 Sep 2018 10:30 )             34.6     09-03    132<L>  |  94<L>  |  30.0<H>  ----------------------------<  208<H>  4.5   |  25.0  |  3.95<H>    Ca    9.4      03 Sep 2018 10:30        MEDICATIONS  (STANDING):  acetaminophen   Tablet PRN  acetaminophen   Tablet. PRN  aspirin enteric coated  calcitriol   Capsule  calcium carbonate   1250 mG (OsCal)  carvedilol  chlorhexidine 2% Cloths  dextrose 40% Gel PRN  dextrose 5%.  dextrose 50% Injectable  dextrose 50% Injectable  dextrose 50% Injectable  docusate sodium  ferrous    sulfate  glucagon  Injectable PRN  heparin  Injectable  insulin lispro (HumaLOG) corrective regimen sliding scale  insulin lispro (HumaLOG) corrective regimen sliding scale  morphine  - Injectable PRN  oxyCODONE    5 mG/acetaminophen 325 mG PRN  piperacillin/tazobactam IVPB.  sacubitril 24 mG/valsartan 26 mG  senna PRN  sodium chloride 0.9%

## 2018-09-04 NOTE — PROCEDURE NOTE - NSPROCDETAILS_GEN_ALL_CORE
blood seen on insertion/dressing applied/flushes easily/secured in place/sterile technique, catheter placed/location identified, draped/prepped, sterile technique used
percutaneous/sterile dressing applied/supine position/Seldinger technique/secured in place/ultrasound assessment of fluid (location)/ultrasound assessment of fluid (size)

## 2018-09-04 NOTE — PROGRESS NOTE ADULT - SUBJECTIVE AND OBJECTIVE BOX
HPI/OVERNIGHT EVENTS: No acute events overnight. Patient will undergo HD today 9/4 and will monitor if patient is tolerating dialysis through the AVG. Patient was evaluated at bedside and found in no acute distress. Patient is tolerating diet and having bowel movements. Denies nausea, vomiting, fever, chest pain or SOB    MEDICATIONS  (STANDING):  aspirin enteric coated 81 milliGRAM(s) Oral daily  calcitriol   Capsule 0.25 MICROGram(s) Oral two times a day  calcium carbonate   1250 mG (OsCal) 1 Tablet(s) Oral three times a day  carvedilol 12.5 milliGRAM(s) Oral every 12 hours  chlorhexidine 2% Cloths 1 Application(s) Topical <User Schedule>  dextrose 5%. 1000 milliLiter(s) (50 mL/Hr) IV Continuous <Continuous>  dextrose 50% Injectable 12.5 Gram(s) IV Push once  dextrose 50% Injectable 25 Gram(s) IV Push once  dextrose 50% Injectable 25 Gram(s) IV Push once  docusate sodium 100 milliGRAM(s) Oral two times a day  ferrous    sulfate 325 milliGRAM(s) Oral daily  heparin  Injectable 5000 Unit(s) SubCutaneous every 12 hours  insulin lispro (HumaLOG) corrective regimen sliding scale   SubCutaneous three times a day before meals  insulin lispro (HumaLOG) corrective regimen sliding scale   SubCutaneous at bedtime  piperacillin/tazobactam IVPB. 2.25 Gram(s) IV Intermittent every 12 hours  sacubitril 24 mG/valsartan 26 mG 1 Tablet(s) Oral two times a day  sodium chloride 0.9% 1000 milliLiter(s) (43 mL/Hr) IV Continuous <Continuous>    MEDICATIONS  (PRN):  acetaminophen   Tablet 650 milliGRAM(s) Oral every 6 hours PRN For Temp greater than 38 C (100.4 F)  acetaminophen   Tablet. 650 milliGRAM(s) Oral every 6 hours PRN Mild Pain (1 - 3)  dextrose 40% Gel 15 Gram(s) Oral once PRN Blood Glucose LESS THAN 70 milliGRAM(s)/deciliter  glucagon  Injectable 1 milliGRAM(s) IntraMuscular once PRN Glucose LESS THAN 70 milligrams/deciliter  morphine  - Injectable 2 milliGRAM(s) IV Push every 4 hours PRN Severe Pain (7 - 10)  oxyCODONE    5 mG/acetaminophen 325 mG 2 Tablet(s) Oral every 4 hours PRN Moderate Pain (4 - 6)  senna 2 Tablet(s) Oral at bedtime PRN Constipation      Vital Signs Last 24 Hrs  T(C): 36.6 (04 Sep 2018 04:45), Max: 36.6 (03 Sep 2018 09:20)  T(F): 97.9 (04 Sep 2018 04:45), Max: 97.9 (04 Sep 2018 04:45)  HR: 73 (04 Sep 2018 04:45) (73 - 76)  BP: 156/89 (04 Sep 2018 04:45) (122/60 - 156/89)  BP(mean): --  RR: 19 (04 Sep 2018 04:45) (19 - 20)  SpO2: 94% (03 Sep 2018 16:03) (92% - 94%)    PE  Gen: NAD  Pulm: CTAB/L  CV: RRR, nontachycardic, R chest permacath in place, no erythema or discharge  Vasc: WWP. LUE Palpable strong thrill AV graft. graft appears superficial, close to skin, easily accessible. 2+ radial pulses bilaterally. Full motor function of hand.  Sensation intact      I&O's Detail    03 Sep 2018 07:01  -  04 Sep 2018 07:00  --------------------------------------------------------  IN:    Oral Fluid: 360 mL    sodium chloride 0.9%: 473 mL    Solution: 100 mL  Total IN: 933 mL    OUT:  Total OUT: 0 mL    Total NET: 933 mL          LABS:                        10.4   3.8   )-----------( 235      ( 03 Sep 2018 10:30 )             34.6     09-03    132<L>  |  94<L>  |  30.0<H>  ----------------------------<  208<H>  4.5   |  25.0  |  3.95<H>    Ca    9.4      03 Sep 2018 10:30

## 2018-09-04 NOTE — PROGRESS NOTE ADULT - ASSESSMENT
59 y/o woman with ESRD on HD, history of pericardial effusion, DM, A-fib not on anticoagulation secondary to GI bleed, anemia, Pubic rami fracture and anemia was admitted with SOB during HD, in ED was found with a large pleural effusion and a chest tube was placed. Now s/p removal of tube, on zosyn with good response. cultures neg. repeat CXR with improvement in lung infiltration and pleural effusion.     1-Pneumonia:  -Possible HCAP since she is in and out of HD center, Improving on zosyn   -Will continue on zosyn IV for now, needs at least 7-days.     2-UTI;  -UA was not clean and UC grew multiple bacteria  -Zosyn will cover it anyway.

## 2018-09-04 NOTE — PROGRESS NOTE ADULT - ASSESSMENT
58 year old female with ESRD on HD (MWF) previously with R chest permacath, now with LUE AV graft used for HD 9/1. Will continue to monitor function after HD today 9/4.    Plan:  - F/U AVG after HD  - Possible D/C of right permacath during this week  - Rest of treatment per primary team

## 2018-09-04 NOTE — PROCEDURE NOTE - ADDITIONAL PROCEDURE DETAILS
bed down rales up tourniquet removed sharp disposed of
CT clamped at 1L, to be unclamped in one hour and reclamped if additional liter drains, otherwise leave to waterseal

## 2018-09-05 LAB
ALBUMIN SERPL ELPH-MCNC: 2.7 G/DL — LOW (ref 3.3–5.2)
ALP SERPL-CCNC: 84 U/L — SIGNIFICANT CHANGE UP (ref 40–120)
ALT FLD-CCNC: 8 U/L — SIGNIFICANT CHANGE UP
ANION GAP SERPL CALC-SCNC: 14 MMOL/L — SIGNIFICANT CHANGE UP (ref 5–17)
AST SERPL-CCNC: 20 U/L — SIGNIFICANT CHANGE UP
BILIRUB SERPL-MCNC: 0.7 MG/DL — SIGNIFICANT CHANGE UP (ref 0.4–2)
BUN SERPL-MCNC: 19 MG/DL — SIGNIFICANT CHANGE UP (ref 8–20)
CALCIUM SERPL-MCNC: 8.5 MG/DL — LOW (ref 8.6–10.2)
CHLORIDE SERPL-SCNC: 93 MMOL/L — LOW (ref 98–107)
CO2 SERPL-SCNC: 27 MMOL/L — SIGNIFICANT CHANGE UP (ref 22–29)
CREAT SERPL-MCNC: 3.38 MG/DL — HIGH (ref 0.5–1.3)
GLUCOSE BLDC GLUCOMTR-MCNC: 129 MG/DL — HIGH (ref 70–99)
GLUCOSE BLDC GLUCOMTR-MCNC: 142 MG/DL — HIGH (ref 70–99)
GLUCOSE BLDC GLUCOMTR-MCNC: 149 MG/DL — HIGH (ref 70–99)
GLUCOSE BLDC GLUCOMTR-MCNC: 192 MG/DL — HIGH (ref 70–99)
GLUCOSE SERPL-MCNC: 145 MG/DL — HIGH (ref 70–115)
HCT VFR BLD CALC: 35.2 % — LOW (ref 37–47)
HGB BLD-MCNC: 10.6 G/DL — LOW (ref 12–16)
MAGNESIUM SERPL-MCNC: 2 MG/DL — SIGNIFICANT CHANGE UP (ref 1.6–2.6)
MCHC RBC-ENTMCNC: 28 PG — SIGNIFICANT CHANGE UP (ref 27–31)
MCHC RBC-ENTMCNC: 30.1 G/DL — LOW (ref 32–36)
MCV RBC AUTO: 92.9 FL — SIGNIFICANT CHANGE UP (ref 81–99)
PLATELET # BLD AUTO: 258 K/UL — SIGNIFICANT CHANGE UP (ref 150–400)
POTASSIUM SERPL-MCNC: 4.1 MMOL/L — SIGNIFICANT CHANGE UP (ref 3.5–5.3)
POTASSIUM SERPL-SCNC: 4.1 MMOL/L — SIGNIFICANT CHANGE UP (ref 3.5–5.3)
PROT SERPL-MCNC: 6.2 G/DL — LOW (ref 6.6–8.7)
RBC # BLD: 3.79 M/UL — LOW (ref 4.4–5.2)
RBC # FLD: 14.6 % — SIGNIFICANT CHANGE UP (ref 11–15.6)
SODIUM SERPL-SCNC: 134 MMOL/L — LOW (ref 135–145)
WBC # BLD: 4 K/UL — LOW (ref 4.8–10.8)
WBC # FLD AUTO: 4 K/UL — LOW (ref 4.8–10.8)

## 2018-09-05 PROCEDURE — 99233 SBSQ HOSP IP/OBS HIGH 50: CPT

## 2018-09-05 PROCEDURE — 99232 SBSQ HOSP IP/OBS MODERATE 35: CPT

## 2018-09-05 RX ORDER — SACUBITRIL AND VALSARTAN 24; 26 MG/1; MG/1
1 TABLET, FILM COATED ORAL
Qty: 0 | Refills: 0 | Status: DISCONTINUED | OUTPATIENT
Start: 2018-09-05 | End: 2018-09-08

## 2018-09-05 RX ADMIN — HEPARIN SODIUM 5000 UNIT(S): 5000 INJECTION INTRAVENOUS; SUBCUTANEOUS at 17:48

## 2018-09-05 RX ADMIN — Medication 100 MILLIGRAM(S): at 05:31

## 2018-09-05 RX ADMIN — CARVEDILOL PHOSPHATE 12.5 MILLIGRAM(S): 80 CAPSULE, EXTENDED RELEASE ORAL at 17:44

## 2018-09-05 RX ADMIN — HEPARIN SODIUM 5000 UNIT(S): 5000 INJECTION INTRAVENOUS; SUBCUTANEOUS at 05:31

## 2018-09-05 RX ADMIN — SACUBITRIL AND VALSARTAN 1 TABLET(S): 24; 26 TABLET, FILM COATED ORAL at 17:48

## 2018-09-05 RX ADMIN — Medication 1 TABLET(S): at 22:33

## 2018-09-05 RX ADMIN — Medication 325 MILLIGRAM(S): at 13:41

## 2018-09-05 RX ADMIN — Medication 100 MILLIGRAM(S): at 17:46

## 2018-09-05 RX ADMIN — SACUBITRIL AND VALSARTAN 1 TABLET(S): 24; 26 TABLET, FILM COATED ORAL at 05:31

## 2018-09-05 RX ADMIN — CALCITRIOL 0.25 MICROGRAM(S): 0.5 CAPSULE ORAL at 17:44

## 2018-09-05 RX ADMIN — CALCITRIOL 0.25 MICROGRAM(S): 0.5 CAPSULE ORAL at 05:31

## 2018-09-05 RX ADMIN — CARVEDILOL PHOSPHATE 12.5 MILLIGRAM(S): 80 CAPSULE, EXTENDED RELEASE ORAL at 05:31

## 2018-09-05 RX ADMIN — PIPERACILLIN AND TAZOBACTAM 200 GRAM(S): 4; .5 INJECTION, POWDER, LYOPHILIZED, FOR SOLUTION INTRAVENOUS at 05:32

## 2018-09-05 RX ADMIN — Medication 1 TABLET(S): at 17:45

## 2018-09-05 RX ADMIN — Medication 1 TABLET(S): at 05:31

## 2018-09-05 RX ADMIN — PIPERACILLIN AND TAZOBACTAM 200 GRAM(S): 4; .5 INJECTION, POWDER, LYOPHILIZED, FOR SOLUTION INTRAVENOUS at 17:46

## 2018-09-05 RX ADMIN — Medication 2: at 17:48

## 2018-09-05 RX ADMIN — Medication 81 MILLIGRAM(S): at 13:41

## 2018-09-05 RX ADMIN — CHLORHEXIDINE GLUCONATE 1 APPLICATION(S): 213 SOLUTION TOPICAL at 05:31

## 2018-09-05 RX ADMIN — SODIUM CHLORIDE 43 MILLILITER(S): 9 INJECTION, SOLUTION INTRAVENOUS at 05:32

## 2018-09-05 NOTE — PROGRESS NOTE ADULT - ASSESSMENT
57 y/o F PMHx of NICM, HFrEF (EF 40-45%), Afib not on AC due to GI bleed, pleural effusion, pericardial effusion, ESRD MWF HD, anemia, and pubic rami fracture transferred from HD facility due to SOB. Found to have worsening right pleural effusion s/p Pigtail catheter. New nonspecific EKG changes. BNP >22356.    1. NICM HFrEF (EF 20-25%) -compensated, improved with HD.  Repeat TTE with worsened global hypokinesis.  LVEF 20-25%, tolerating entresto.  Will uptitrate entresto today.  Cardiac cath earlier this year with non-obstructive CAD.  Suspect HF due to hypertensive heart disease.    2. Paroxysmal Afib- currently in SR, rates controlled.  Unable to tolerate coumadin due to GI bleed. Was being evaluated for Watchman procedure, however patient missed appointment in office.  Will continue plan for eventual Watchman.  No AC.   3. Pleural Effusion bilateral, R> L - s/p thoracentesis of right.  CT now out.  No SOB.    4. hypertension - well controlled on current regimen  5. HCAP - on zosyn  6. Pericardial effusion - hx of uremic pericarditis, effusion resolved on repeat TTE.  Doing well off colchicine.   7. ESRD - tolerating HD via AVF, perm cath removal per vascular surgery.     Will follow.   Thank you for allowing me to participate in your patient's care.

## 2018-09-05 NOTE — DIETITIAN INITIAL EVALUATION ADULT. - PERTINENT LABORATORY DATA
09-05 Na134 mmol/L<L> Glu 145 mg/dL<H> K+ 4.1 mmol/L Cr  3.38 mg/dL<H> BUN 19.0 mg/dL Phos n/a   Alb 2.7 g/dL<L> PAB n/a

## 2018-09-05 NOTE — PROGRESS NOTE ADULT - SUBJECTIVE AND OBJECTIVE BOX
[facility]                                                        Office: [address] [address]                                                       Telephone: [phone]. Fax:[phone]      CARDIOLOGY PROGRESS NOTE   ([facility])    Subjective: Patient seen and examined.  Sitting up in bed, denies any symptoms, feels well. Over the weekend with fever/code sepsis called.  Currently on zosyn for possible HCAP.     ROS: No headache, no chest pain, no SOB, no palpitations, no dizziness, no nausea, no bleeding    Chronic Conditions:  ESRD - tolerating HD, getting HD via LUE AVF, permacath removal per vascular surgery  HFPEF - improved  paroxysmal atrial fibrillation - rates controlled, not on AC due to major UGIB  hypertension - well controlled     CURRENT MEDICATIONS  carvedilol 12.5 milliGRAM(s) Oral every 12 hours  sacubitril 49 mG/valsartan 51 mG 1 Tablet(s) Oral two times a day  piperacillin/tazobactam IVPB. 2.25 Gram(s) IV Intermittent every 12 hours  acetaminophen   Tablet 650 milliGRAM(s) Oral every 6 hours PRN  acetaminophen   Tablet. 650 milliGRAM(s) Oral every 6 hours PRN  morphine  - Injectable 2 milliGRAM(s) IV Push every 4 hours PRN  oxyCODONE    5 mG/acetaminophen 325 mG 2 Tablet(s) Oral every 4 hours PRN  docusate sodium 100 milliGRAM(s) Oral two times a day  senna 2 Tablet(s) Oral at bedtime PRN  dextrose 40% Gel 15 Gram(s) Oral once PRN  dextrose 50% Injectable 12.5 Gram(s) IV Push once  dextrose 50% Injectable 25 Gram(s) IV Push once  dextrose 50% Injectable 25 Gram(s) IV Push once  glucagon  Injectable 1 milliGRAM(s) IntraMuscular once PRN  insulin lispro (HumaLOG) corrective regimen sliding scale   SubCutaneous three times a day before meals  insulin lispro (HumaLOG) corrective regimen sliding scale   SubCutaneous at bedtime  aspirin enteric coated 81 milliGRAM(s) Oral daily  calcitriol   Capsule 0.25 MICROGram(s) Oral two times a day  calcium carbonate   1250 mG (OsCal) 1 Tablet(s) Oral three times a day  chlorhexidine 2% Cloths 1 Application(s) Topical <User Schedule>  dextrose 5%. 1000 milliLiter(s) IV Continuous <Continuous>  ferrous    sulfate 325 milliGRAM(s) Oral daily  heparin  Injectable 5000 Unit(s) SubCutaneous every 12 hours  sodium chloride 0.9% 1000 milliLiter(s) IV Continuous <Continuous>    	  TELEMETRY: SR  Vitals:  T(C): 36.8 (09-05-18 @ 05:10), Max: 36.8 (09-04-18 @ 18:35)  HR: 76 (09-05-18 @ 05:10) (70 - 82)  BP: 143/87 (09-05-18 @ 05:10) (136/77 - 158/90)  RR: 19 (09-05-18 @ 05:10) (18 - 19)  SpO2: 96% (09-05-18 @ 08:25) (94% - 98%)  Wt(kg): --  I&O's Summary    04 Sep 2018 07:01  -  05 Sep 2018 07:00  --------------------------------------------------------  IN: 833 mL / OUT: 2501 mL / NET: -1668 mL    PHYSICAL EXAM:  Appearance: Normal	  HEENT:   Normal oral mucosa, PERRL, EOMI	  Lymphatic: No lymphadenopathy  Cardiovascular: Normal S1 S2, +JVD, III/VI systolic murmur,  No edema  Respiratory: mid to base crackles b/l  Psychiatry: A & O x 3, Mood & affect appropriate  Gastrointestinal:  Soft, Non-tender, + BS	  Skin: No rashes, No ecchymoses, No cyanosis  Neurologic: Non-focal  Extremities: Normal range of motion, No clubbing, cyanosis or edema  Vascular: Peripheral pulses palpable 2+ bilaterally, warm, LUE AVF, R subclavian permcath    DIAGNOSTIC TESTING:                       10.6   4.0   )-----------( 258      ( 05 Sep 2018 08:03 )             35.2     09-05    134<L>  |  93<L>  |  19.0  ----------------------------<  145<H>  4.1   |  27.0  |  3.38<H>    Ca    8.5<L>      05 Sep 2018 08:03  Mg     2.0     09-05    TPro  6.2<L>  /  Alb  2.7<L>  /  TBili  0.7  /  DBili  x   /  AST  20  /  ALT  8   /  AlkPhos  84  09-05

## 2018-09-05 NOTE — DIETITIAN INITIAL EVALUATION ADULT. - OTHER INFO
Pt admitted for SOB. Pt with ESRD on Hemodialysis. Pt with hx of DM and acknowledged DM recommendations. Pt claims no recent weight changes. Pt refused education.

## 2018-09-05 NOTE — PROGRESS NOTE ADULT - ASSESSMENT
The patient is a 58 year old female with a history of ESRD on HD MWF, afib not on anticoagulation given history of GIB, chronic diastolic CHF who presented to the ER with complaints of worsening shortness of breath. CT on admission consistent with large right pleural effusion now status post pigtail placement by CTS.  Code sepsis called for fever. Started on IV zosyn, cultures ordered, ID consulted. Chest tube removed by CT surgery on 8/2. Vascular surgery consulted for permacath removal.     Assessment/Plan:    1. Sepsis secondary to right upper lobe pneumonia with possible parapneumonic effusion  - Blood cultures on 8/30 negative x 2  - ID on consult; continue abx for a total of 7 days of zosyn today day 5  - Status post chest tube removal on 9/2  - pleural fluid cultures and acid fast staining negative thus far.    2. ESRD on HD MWF: Renal following. Possibly removal of permacath in chest wall with vascular given that her AVF is functioning and she used it for dialysis on 9/1 as well as yesterday  - likely removal of permacath today    3. Acute on chronic systolic CHF with NICM: Echocardiogram with  global hypokinesia with low ef of 25%. Spoke with cardio, cath early this  year with non obstructive CAD. Added entresto; on BB.     4. AFib: Rate controlled; Unable to tolerate coumadin due to GI bleed. Was being evaluated for Watchmen procedure, however patient missed appointment in office.    5. Pericardial effusion - hx of uremic pericarditis, currently stable.  No pericardial effusion on echo; colchicine discontinued.    6. Diabetes mellitus type 2: HSS> MOnitor bsl.    7. Hypocalcemia: Improved, continue ca carb and calcitriol/ Repeat BMP this morning.    VTE- Heparin Subcut

## 2018-09-05 NOTE — PROGRESS NOTE ADULT - SUBJECTIVE AND OBJECTIVE BOX
CC: Pleural effusion    INTERVAL HPI/OVERNIGHT EVENTS: No complaints this morning, denies pain. Ambulating with PT. Permacath bothers her she said but otherwise non-tender, non-purulent. She states her hemoptysis is no longer present. She states cough is improved. She ambulates to the bathroom on her own.    ICU Vital Signs Last 24 Hrs  T(C): 36.8 (05 Sep 2018 12:03), Max: 36.8 (04 Sep 2018 18:35)  T(F): 98.2 (05 Sep 2018 12:03), Max: 98.3 (05 Sep 2018 05:10)  HR: 73 (05 Sep 2018 12:03) (70 - 82)  BP: 144/82 (05 Sep 2018 12:03) (136/77 - 158/90)  BP(mean): --  ABP: --  ABP(mean): --  RR: 18 (05 Sep 2018 12:03) (18 - 19)  SpO2: 96% (05 Sep 2018 08:25) (94% - 98%)    PHYSICAL EXAM:  GENERAL: NAD, AOX3  HEAD:  Atraumatic, Normocephalic  EYES: EOMI, PERRLA, conjunctiva and sclera clear  ENMT: Moist mucous membranes  CHEST/LUNG: Decreased crackles right and left lung base  HEART: Regular rate and rhythm; No murmurs, rubs, or gallops + Permacath   ABDOMEN: Soft, Nontender, Nondistended; Bowel sounds present  EXTREMITIES:  2+ Peripheral Pulses, No clubbing, cyanosis, or edema    MEDICATIONS  (STANDING):  aspirin enteric coated 81 milliGRAM(s) Oral daily  calcitriol   Capsule 0.25 MICROGram(s) Oral two times a day  calcium carbonate   1250 mG (OsCal) 1 Tablet(s) Oral three times a day  carvedilol 12.5 milliGRAM(s) Oral every 12 hours  chlorhexidine 2% Cloths 1 Application(s) Topical <User Schedule>  dextrose 5%. 1000 milliLiter(s) (50 mL/Hr) IV Continuous <Continuous>  dextrose 50% Injectable 12.5 Gram(s) IV Push once  dextrose 50% Injectable 25 Gram(s) IV Push once  dextrose 50% Injectable 25 Gram(s) IV Push once  docusate sodium 100 milliGRAM(s) Oral two times a day  ferrous    sulfate 325 milliGRAM(s) Oral daily  heparin  Injectable 5000 Unit(s) SubCutaneous every 12 hours  insulin lispro (HumaLOG) corrective regimen sliding scale   SubCutaneous three times a day before meals  insulin lispro (HumaLOG) corrective regimen sliding scale   SubCutaneous at bedtime  piperacillin/tazobactam IVPB. 2.25 Gram(s) IV Intermittent every 12 hours  sacubitril 24 mG/valsartan 26 mG 1 Tablet(s) Oral two times a day  sodium chloride 0.9% 1000 milliLiter(s) (43 mL/Hr) IV Continuous <Continuous>    MEDICATIONS  (PRN):  acetaminophen   Tablet 650 milliGRAM(s) Oral every 6 hours PRN For Temp greater than 38 C (100.4 F)  acetaminophen   Tablet. 650 milliGRAM(s) Oral every 6 hours PRN Mild Pain (1 - 3)  dextrose 40% Gel 15 Gram(s) Oral once PRN Blood Glucose LESS THAN 70 milliGRAM(s)/deciliter  glucagon  Injectable 1 milliGRAM(s) IntraMuscular once PRN Glucose LESS THAN 70 milligrams/deciliter  morphine  - Injectable 2 milliGRAM(s) IV Push every 4 hours PRN Severe Pain (7 - 10)  oxyCODONE    5 mG/acetaminophen 325 mG 2 Tablet(s) Oral every 4 hours PRN Moderate Pain (4 - 6)  senna 2 Tablet(s) Oral at bedtime PRN Constipation    LABS:                        10.6   4.0   )-----------( 258      ( 05 Sep 2018 08:03 )             35.2     09-05    134<L>  |  93<L>  |  19.0  ----------------------------<  145<H>  4.1   |  27.0  |  3.38<H>    Ca    8.5<L>      05 Sep 2018 08:03  Mg     2.0     09-05    TPro  6.2<L>  /  Alb  2.7<L>  /  TBili  0.7  /  DBili  x   /  AST  20  /  ALT  8   /  AlkPhos  84  09-05    RADIOLOGY & ADDITIONAL TESTS:  < from: Xray Chest 1 View- PORTABLE-Urgent (09.02.18 @ 12:57) >  Impression:improvement in BILATERAL lower lobe infiltrates and   atelectasis with small BILATERAL pleural effusions. RIGHT pigtail   catheter has been removed    < end of copied text >

## 2018-09-05 NOTE — PROGRESS NOTE ADULT - ASSESSMENT
ESRD kiara on TTS schedule  Using arm   vascular to remove permacath     Anemia - hgb stable     RO - low calcium and phos   cont Rocaltrol bid   cont Tums     PNA   S/p drainage of effusion   Abx as per ID   Follow up cultures     Will follow

## 2018-09-05 NOTE — PROGRESS NOTE ADULT - SUBJECTIVE AND OBJECTIVE BOX
NEPHROLOGY INTERVAL HPI/OVERNIGHT EVENTS:    Examined earlier    MEDICATIONS  (STANDING):  aspirin enteric coated 81 milliGRAM(s) Oral daily  calcitriol   Capsule 0.25 MICROGram(s) Oral two times a day  calcium carbonate   1250 mG (OsCal) 1 Tablet(s) Oral three times a day  carvedilol 12.5 milliGRAM(s) Oral every 12 hours  chlorhexidine 2% Cloths 1 Application(s) Topical <User Schedule>  dextrose 5%. 1000 milliLiter(s) (50 mL/Hr) IV Continuous <Continuous>  dextrose 50% Injectable 12.5 Gram(s) IV Push once  dextrose 50% Injectable 25 Gram(s) IV Push once  dextrose 50% Injectable 25 Gram(s) IV Push once  docusate sodium 100 milliGRAM(s) Oral two times a day  ferrous    sulfate 325 milliGRAM(s) Oral daily  heparin  Injectable 5000 Unit(s) SubCutaneous every 12 hours  insulin lispro (HumaLOG) corrective regimen sliding scale   SubCutaneous three times a day before meals  insulin lispro (HumaLOG) corrective regimen sliding scale   SubCutaneous at bedtime  piperacillin/tazobactam IVPB. 2.25 Gram(s) IV Intermittent every 12 hours  sacubitril 49 mG/valsartan 51 mG 1 Tablet(s) Oral two times a day  sodium chloride 0.9% 1000 milliLiter(s) (43 mL/Hr) IV Continuous <Continuous>    MEDICATIONS  (PRN):  acetaminophen   Tablet 650 milliGRAM(s) Oral every 6 hours PRN For Temp greater than 38 C (100.4 F)  acetaminophen   Tablet. 650 milliGRAM(s) Oral every 6 hours PRN Mild Pain (1 - 3)  dextrose 40% Gel 15 Gram(s) Oral once PRN Blood Glucose LESS THAN 70 milliGRAM(s)/deciliter  glucagon  Injectable 1 milliGRAM(s) IntraMuscular once PRN Glucose LESS THAN 70 milligrams/deciliter  morphine  - Injectable 2 milliGRAM(s) IV Push every 4 hours PRN Severe Pain (7 - 10)  oxyCODONE    5 mG/acetaminophen 325 mG 2 Tablet(s) Oral every 4 hours PRN Moderate Pain (4 - 6)  senna 2 Tablet(s) Oral at bedtime PRN Constipation      Allergies    No Known Allergies    Intolerances        Vital Signs Last 24 Hrs  T(C): 36.8 (05 Sep 2018 12:03), Max: 36.8 (04 Sep 2018 18:35)  T(F): 98.2 (05 Sep 2018 12:03), Max: 98.3 (05 Sep 2018 05:10)  HR: 73 (05 Sep 2018 12:03) (73 - 82)  BP: 144/82 (05 Sep 2018 12:03) (136/77 - 144/82)  BP(mean): --  RR: 18 (05 Sep 2018 12:03) (18 - 19)  SpO2: 96% (05 Sep 2018 08:25) (94% - 98%)  Daily     Daily Weight in k.4 (05 Sep 2018 10:50)    PHYSICAL EXAM:  EYES:  EOMI  NECK: Supple, No JVD  NERVOUS SYSTEM:  A/O x3,   CHEST:  R base rales occ rhonchi, poor BS bases  HEART:  RRR, gr 2 murmur  ABDOMEN: Soft, NT/ND BS+  EXTREMITIES:  + Edema;  SKIN: No rashes    LABS:                        10.6   4.0   )-----------( 258      ( 05 Sep 2018 08:03 )             35.2         134<L>  |  93<L>  |  19.0  ----------------------------<  145<H>  4.1   |  27.0  |  3.38<H>    Ca    8.5<L>      05 Sep 2018 08:03  Mg     2.0         TPro  6.2<L>  /  Alb  2.7<L>  /  TBili  0.7  /  DBili  x   /  AST  20  /  ALT  8   /  AlkPhos  84          Magnesium, Serum: 2.0 mg/dL ( @ 08:03)          RADIOLOGY & ADDITIONAL TESTS:

## 2018-09-06 ENCOUNTER — TRANSCRIPTION ENCOUNTER (OUTPATIENT)
Age: 58
End: 2018-09-06

## 2018-09-06 LAB
CULTURE RESULTS: SIGNIFICANT CHANGE UP
CULTURE RESULTS: SIGNIFICANT CHANGE UP
GLUCOSE BLDC GLUCOMTR-MCNC: 116 MG/DL — HIGH (ref 70–99)
GLUCOSE BLDC GLUCOMTR-MCNC: 154 MG/DL — HIGH (ref 70–99)
GLUCOSE BLDC GLUCOMTR-MCNC: 168 MG/DL — HIGH (ref 70–99)
GLUCOSE BLDC GLUCOMTR-MCNC: 193 MG/DL — HIGH (ref 70–99)
SPECIMEN SOURCE: SIGNIFICANT CHANGE UP
SPECIMEN SOURCE: SIGNIFICANT CHANGE UP

## 2018-09-06 PROCEDURE — 99232 SBSQ HOSP IP/OBS MODERATE 35: CPT

## 2018-09-06 RX ADMIN — Medication 1 TABLET(S): at 22:11

## 2018-09-06 RX ADMIN — SODIUM CHLORIDE 43 MILLILITER(S): 9 INJECTION, SOLUTION INTRAVENOUS at 02:57

## 2018-09-06 RX ADMIN — Medication 2: at 17:35

## 2018-09-06 RX ADMIN — Medication 2: at 09:06

## 2018-09-06 RX ADMIN — HEPARIN SODIUM 5000 UNIT(S): 5000 INJECTION INTRAVENOUS; SUBCUTANEOUS at 05:08

## 2018-09-06 RX ADMIN — PIPERACILLIN AND TAZOBACTAM 200 GRAM(S): 4; .5 INJECTION, POWDER, LYOPHILIZED, FOR SOLUTION INTRAVENOUS at 18:00

## 2018-09-06 RX ADMIN — PIPERACILLIN AND TAZOBACTAM 200 GRAM(S): 4; .5 INJECTION, POWDER, LYOPHILIZED, FOR SOLUTION INTRAVENOUS at 05:08

## 2018-09-06 RX ADMIN — Medication 100 MILLIGRAM(S): at 05:08

## 2018-09-06 RX ADMIN — Medication 325 MILLIGRAM(S): at 14:00

## 2018-09-06 RX ADMIN — CARVEDILOL PHOSPHATE 12.5 MILLIGRAM(S): 80 CAPSULE, EXTENDED RELEASE ORAL at 05:08

## 2018-09-06 RX ADMIN — Medication 100 MILLIGRAM(S): at 17:35

## 2018-09-06 RX ADMIN — HEPARIN SODIUM 5000 UNIT(S): 5000 INJECTION INTRAVENOUS; SUBCUTANEOUS at 17:39

## 2018-09-06 RX ADMIN — CALCITRIOL 0.25 MICROGRAM(S): 0.5 CAPSULE ORAL at 05:08

## 2018-09-06 RX ADMIN — Medication 1 TABLET(S): at 14:01

## 2018-09-06 RX ADMIN — CARVEDILOL PHOSPHATE 12.5 MILLIGRAM(S): 80 CAPSULE, EXTENDED RELEASE ORAL at 17:35

## 2018-09-06 RX ADMIN — CHLORHEXIDINE GLUCONATE 1 APPLICATION(S): 213 SOLUTION TOPICAL at 05:17

## 2018-09-06 RX ADMIN — CALCITRIOL 0.25 MICROGRAM(S): 0.5 CAPSULE ORAL at 17:35

## 2018-09-06 RX ADMIN — Medication 1 TABLET(S): at 05:08

## 2018-09-06 RX ADMIN — SACUBITRIL AND VALSARTAN 1 TABLET(S): 24; 26 TABLET, FILM COATED ORAL at 05:17

## 2018-09-06 RX ADMIN — Medication 81 MILLIGRAM(S): at 13:58

## 2018-09-06 RX ADMIN — SACUBITRIL AND VALSARTAN 1 TABLET(S): 24; 26 TABLET, FILM COATED ORAL at 17:35

## 2018-09-06 NOTE — PROGRESS NOTE ADULT - ASSESSMENT
The patient is a 58 year old female with a history of ESRD on HD MWF, afib not on anticoagulation given history of GIB, chronic diastolic CHF who presented to the ER with complaints of worsening shortness of breath. CT on admission consistent with large right pleural effusion now status post pigtail placement by CTS.  Code sepsis called for fever. Started on IV zosyn, cultures ordered, ID consulted. Chest tube removed by CT surgery on 8/2. Vascular surgery consulted for permacath removal.     Assessment/Plan:    1. Sepsis secondary to right upper lobe pneumonia with possible parapneumonic effusion  - Blood cultures on 8/30 negative x 2  - ID on consult; continue abx for a total of 7 days of zosyn today day 6; will finish tomorrow and D/C abx  - appreciate ID consult  - Status post chest tube removal on 9/2  - pleural fluid cultures and acid fast staining negative thus far.    2. ESRD on HD MWF: Renal following. Possibly removal of permacath in chest wall with vascular given that her AVF is functioning and she used it for dialysis on 9/1 as well as yesterday  - vascular would like to take her for graft study tomorrow and then if clear patient can have permacath removed.    3. Acute on chronic systolic CHF with NICM: Echocardiogram with  global hypokinesia with low ef of 25%. Spoke with cardio, cath early this  year with non obstructive CAD. Added entresto; on BB.     4. AFib: Rate controlled; Unable to tolerate coumadin due to GI bleed. Was being evaluated for Watchmen procedure, however patient missed appointment in office.    5. Pericardial effusion - hx of uremic pericarditis, currently stable.  No pericardial effusion on echo; colchicine discontinued.    6. Diabetes mellitus type 2: HSS> MOnitor bsl.    7. Hypocalcemia: Improved, continue ca carb and calcitriol/ Repeat BMP this morning.    VTE- Heparin Subcut

## 2018-09-06 NOTE — PROGRESS NOTE ADULT - ASSESSMENT
57 y/o woman with ESRD on HD, history of pericardial effusion, DM, A-fib not on anticoagulation secondary to GI bleed, anemia, Pubic rami fracture and anemia was admitted with SOB during HD, in ED was found with a large pleural effusion and a chest tube was placed. Now s/p removal of tube, on zosyn with good response. cultures neg. repeat CXR with improvement in lung infiltration and pleural effusion.     1-Pneumonia:  -Possible HCAP since she is in and out of HD center, Improving on zosyn   -Will continue on zosyn IV until at least 7 days completed, last day would be 9/7(indlucded)    2-UTI;  -UA was not clean and UC grew multiple bacteria  -Zosyn will cover it anyway, the same course of 7 days would be enough.

## 2018-09-06 NOTE — PROGRESS NOTE ADULT - SUBJECTIVE AND OBJECTIVE BOX
[facility]  INFECTIOUS DISEASES AND INTERNAL MEDICINE at [place]  =======================================================  Jose Carlos Servin MD  Diplomates American Board of Internal Medicine and Infectious Diseases  =======================================================    JAROCHO MORALES [id number]    Follow up:   59 y/o woman with ESRD on HD, history of pericardial effusion, DM, A-fib not on anticoagulation secondary to GI bleed, anemia, Pubic rami fracture and anemia was admitted with SOB during HD, in ED was found with a large pleural effusion and a chest tube was placed. Now s/p removal of tube, on zosyn with good response. cultures neg. repeat CXR with improvement in lung infiltration and pleural effusion.   She is comfortable off oxygen. no complaint. Getting HD.     Allergies:  No Known Allergies    Antibiotics;  Zosyn    REVIEW OF SYSTEMS:  CONSTITUTIONAL:  No Fever or chills, weak  HEENT:   No diplopia or blurred vision.  No earache, sore throat or runny nose.  CARDIOVASCULAR:  No pressure, squeezing, strangling, tightness, heaviness or aching about the chest, neck, axilla or epigastrium.  RESPIRATORY:  + cough, mild shortness of breath  GASTROINTESTINAL:  No nausea, vomiting or diarrhea.  GENITOURINARY:  No dysuria, frequency or urgency. No Blood in urine  MUSCULOSKELETAL:   AS PER HPI  SKIN:  No change in skin, hair or nails.  NEUROLOGIC:  No paresthesias, fasciculations, seizures or weakness.  PSYCHIATRIC:  No disorder of thought or mood.  ENDOCRINE:  No heat or cold intolerance, polyuria or polydipsia.  HEMATOLOGICAL:  No easy bruising or bleeding.      Physical Exam:  Vital Signs Last 24 Hrs  T(C): 36.4 (06 Sep 2018 09:45), Max: 36.8 (05 Sep 2018 12:03)  T(F): 97.5 (06 Sep 2018 09:45), Max: 98.2 (05 Sep 2018 12:03)  HR: 77 (06 Sep 2018 09:45) (73 - 81)  BP: 135/81 (06 Sep 2018 09:45) (132/78 - 160/90)  RR: 18 (06 Sep 2018 09:45) (18 - 18)  SpO2: 96% (06 Sep 2018 09:45) (96% - 96%)  GEN: NAD, pleasant  HEENT: normocephalic and atraumatic. EOMI. PCAO.    NECK: Supple. No carotid bruits.  No lymphadenopathy or thyromegaly.  LUNGS: decreased BS in both bases and some coarse rhonchi bilaterally    HEART: Regular rate and rhythm without murmur.  ABDOMEN: Soft, nontender, and nondistended.  Positive bowel sounds.    : No CVA tenderness  EXTREMITIES: Without any cyanosis, clubbing, rash, lesions or edema.  MSK: no joint swelling  NEUROLOGIC: grossly intact.  PSYCHIATRIC: Appropriate affect .  SKIN: No ulceration or induration present.    Labs:  09-05    134<L>  |  93<L>  |  19.0  ----------------------------<  145<H>  4.1   |  27.0  |  3.38<H>    Ca    8.5<L>      05 Sep 2018 08:03  Mg     2.0     09-05    TPro  6.2<L>  /  Alb  2.7<L>  /  TBili  0.7  /  DBili  x   /  AST  20  /  ALT  8   /  AlkPhos  84  09-05                          10.6   4.0   )-----------( 258      ( 05 Sep 2018 08:03 )             35.2           LIVER FUNCTIONS - ( 05 Sep 2018 08:03 )  Alb: 2.7 g/dL / Pro: 6.2 g/dL / ALK PHOS: 84 U/L / ALT: 8 U/L / AST: 20 U/L / GGT: x           RECENT CULTURES:  09-03 @ 22:22 .Urine Clean Catch (Midstream)     No growth    09-01 @ 10:12 .Urine Clean Catch (Midstream)     Culture grew 3 or more types of organisms which indicate  collection contamination; consider recollection only if clinically  indicated.    09-01 @ 08:39 .Blood Blood-Peripheral     No growth at 5 days.    08-30 @ 18:52 .Body Fluid Pleural Fluid       08-30 @ 13:13 .Blood Blood     No growth at 5 days.    08-30 @ 03:28 .Body Fluid Pleural Fluid     No growth at 5 days.    Few WBC's  No organisms seen

## 2018-09-06 NOTE — PROGRESS NOTE ADULT - SUBJECTIVE AND OBJECTIVE BOX
HPI/OVERNIGHT EVENTS: No acute events overnight. Patient was evaluated at bedside and found afebrile, hemodynamically stable and in no acute distress. Patient tolerated HD yesterday 9/5 well but had low speed (350). Possible Graftogram to evaluate graft and d/c rt. permacath. Denies nausea, vomiting, fever, chest pain or SOB.    MEDICATIONS  (STANDING):  aspirin enteric coated 81 milliGRAM(s) Oral daily  calcitriol   Capsule 0.25 MICROGram(s) Oral two times a day  calcium carbonate   1250 mG (OsCal) 1 Tablet(s) Oral three times a day  carvedilol 12.5 milliGRAM(s) Oral every 12 hours  chlorhexidine 2% Cloths 1 Application(s) Topical <User Schedule>  dextrose 5%. 1000 milliLiter(s) (50 mL/Hr) IV Continuous <Continuous>  dextrose 50% Injectable 12.5 Gram(s) IV Push once  dextrose 50% Injectable 25 Gram(s) IV Push once  dextrose 50% Injectable 25 Gram(s) IV Push once  docusate sodium 100 milliGRAM(s) Oral two times a day  ferrous    sulfate 325 milliGRAM(s) Oral daily  heparin  Injectable 5000 Unit(s) SubCutaneous every 12 hours  insulin lispro (HumaLOG) corrective regimen sliding scale   SubCutaneous three times a day before meals  insulin lispro (HumaLOG) corrective regimen sliding scale   SubCutaneous at bedtime  piperacillin/tazobactam IVPB. 2.25 Gram(s) IV Intermittent every 12 hours  sacubitril 49 mG/valsartan 51 mG 1 Tablet(s) Oral two times a day  sodium chloride 0.9% 1000 milliLiter(s) (43 mL/Hr) IV Continuous <Continuous>    MEDICATIONS  (PRN):  acetaminophen   Tablet 650 milliGRAM(s) Oral every 6 hours PRN For Temp greater than 38 C (100.4 F)  acetaminophen   Tablet. 650 milliGRAM(s) Oral every 6 hours PRN Mild Pain (1 - 3)  dextrose 40% Gel 15 Gram(s) Oral once PRN Blood Glucose LESS THAN 70 milliGRAM(s)/deciliter  glucagon  Injectable 1 milliGRAM(s) IntraMuscular once PRN Glucose LESS THAN 70 milligrams/deciliter  morphine  - Injectable 2 milliGRAM(s) IV Push every 4 hours PRN Severe Pain (7 - 10)  oxyCODONE    5 mG/acetaminophen 325 mG 2 Tablet(s) Oral every 4 hours PRN Moderate Pain (4 - 6)  senna 2 Tablet(s) Oral at bedtime PRN Constipation      Vital Signs Last 24 Hrs  T(C): 36.4 (06 Sep 2018 04:55), Max: 36.8 (05 Sep 2018 12:03)  T(F): 97.6 (06 Sep 2018 04:55), Max: 98.2 (05 Sep 2018 12:03)  HR: 81 (06 Sep 2018 04:55) (73 - 81)  BP: 160/90 (06 Sep 2018 04:55) (132/78 - 160/90)  BP(mean): --  RR: 18 (06 Sep 2018 04:55) (18 - 18)  SpO2: 96% (05 Sep 2018 08:25) (96% - 96%)      PE  Gen: NAD  Pulm: CTAB/L  CV: RRR, nontachycardic, R chest permacath in place, no erythema or discharge  Vasc: WWP. LUE Palpable strong thrill AV graft. graft appears superficial, close to skin, easily accessible. 2+ radial pulses bilaterally. Full motor function of hand.  Sensation intact    I&O's Detail    05 Sep 2018 07:01  -  06 Sep 2018 07:00  --------------------------------------------------------  IN:    Oral Fluid: 720 mL    sodium chloride 0.9%: 473 mL    Solution: 100 mL  Total IN: 1293 mL    OUT:  Total OUT: 0 mL    Total NET: 1293 mL          LABS:                        10.6   4.0   )-----------( 258      ( 05 Sep 2018 08:03 )             35.2     09-05    134<L>  |  93<L>  |  19.0  ----------------------------<  145<H>  4.1   |  27.0  |  3.38<H>    Ca    8.5<L>      05 Sep 2018 08:03  Mg     2.0     09-05    TPro  6.2<L>  /  Alb  2.7<L>  /  TBili  0.7  /  DBili  x   /  AST  20  /  ALT  8   /  AlkPhos  84  09-05 HPI/OVERNIGHT EVENTS: No acute events overnight. Patient was evaluated at bedside and found afebrile, hemodynamically stable and in no acute distress. Patient tolerated HD yesterday 9/5 well but had low speed (350). Possible Graftogram to evaluate graft and d/c rt. permacath. Denies nausea, vomiting, fever, chest pain or SOB.    MEDICATIONS  (STANDING):  aspirin enteric coated 81 milliGRAM(s) Oral daily  calcitriol   Capsule 0.25 MICROGram(s) Oral two times a day  calcium carbonate   1250 mG (OsCal) 1 Tablet(s) Oral three times a day  carvedilol 12.5 milliGRAM(s) Oral every 12 hours  chlorhexidine 2% Cloths 1 Application(s) Topical <User Schedule>  dextrose 5%. 1000 milliLiter(s) (50 mL/Hr) IV Continuous <Continuous>  dextrose 50% Injectable 12.5 Gram(s) IV Push once  dextrose 50% Injectable 25 Gram(s) IV Push once  dextrose 50% Injectable 25 Gram(s) IV Push once  docusate sodium 100 milliGRAM(s) Oral two times a day  ferrous    sulfate 325 milliGRAM(s) Oral daily  heparin  Injectable 5000 Unit(s) SubCutaneous every 12 hours  insulin lispro (HumaLOG) corrective regimen sliding scale   SubCutaneous three times a day before meals  insulin lispro (HumaLOG) corrective regimen sliding scale   SubCutaneous at bedtime  piperacillin/tazobactam IVPB. 2.25 Gram(s) IV Intermittent every 12 hours  sacubitril 49 mG/valsartan 51 mG 1 Tablet(s) Oral two times a day  sodium chloride 0.9% 1000 milliLiter(s) (43 mL/Hr) IV Continuous <Continuous>    MEDICATIONS  (PRN):  acetaminophen   Tablet 650 milliGRAM(s) Oral every 6 hours PRN For Temp greater than 38 C (100.4 F)  acetaminophen   Tablet. 650 milliGRAM(s) Oral every 6 hours PRN Mild Pain (1 - 3)  dextrose 40% Gel 15 Gram(s) Oral once PRN Blood Glucose LESS THAN 70 milliGRAM(s)/deciliter  glucagon  Injectable 1 milliGRAM(s) IntraMuscular once PRN Glucose LESS THAN 70 milligrams/deciliter  morphine  - Injectable 2 milliGRAM(s) IV Push every 4 hours PRN Severe Pain (7 - 10)  oxyCODONE    5 mG/acetaminophen 325 mG 2 Tablet(s) Oral every 4 hours PRN Moderate Pain (4 - 6)  senna 2 Tablet(s) Oral at bedtime PRN Constipation      Vital Signs Last 24 Hrs  T(C): 36.4 (06 Sep 2018 04:55), Max: 36.8 (05 Sep 2018 12:03)  T(F): 97.6 (06 Sep 2018 04:55), Max: 98.2 (05 Sep 2018 12:03)  HR: 81 (06 Sep 2018 04:55) (73 - 81)  BP: 160/90 (06 Sep 2018 04:55) (132/78 - 160/90)  BP(mean): --  RR: 18 (06 Sep 2018 04:55) (18 - 18)  SpO2: 96% (05 Sep 2018 08:25) (96% - 96%)      PE  Gen: NAD  Pulm: CTAB/L  CV: RRR, nontachycardic, R chest permacath in place, no erythema or discharge  Vasc: WWP. LUE Palpable strong thrill AV graft. graft appears superficial, close to skin, easily accessible. No evidence of skin compromise. 2+ radial pulses bilaterally. Full motor function of hand.  Sensation intact. R IJ permcath insitu. Site without erythema or drainage    I&O's Detail    05 Sep 2018 07:01  -  06 Sep 2018 07:00  --------------------------------------------------------  IN:    Oral Fluid: 720 mL    sodium chloride 0.9%: 473 mL    Solution: 100 mL  Total IN: 1293 mL    OUT:  Total OUT: 0 mL    Total NET: 1293 mL          LABS:                        10.6   4.0   )-----------( 258      ( 05 Sep 2018 08:03 )             35.2     09-05    134<L>  |  93<L>  |  19.0  ----------------------------<  145<H>  4.1   |  27.0  |  3.38<H>    Ca    8.5<L>      05 Sep 2018 08:03  Mg     2.0     09-05    TPro  6.2<L>  /  Alb  2.7<L>  /  TBili  0.7  /  DBili  x   /  AST  20  /  ALT  8   /  AlkPhos  84  09-05

## 2018-09-06 NOTE — PROGRESS NOTE ADULT - SUBJECTIVE AND OBJECTIVE BOX
CC: Pleural effusion    INTERVAL HPI/OVERNIGHT EVENTS: No complaints this morning, denies pain. Ambulating with PT. Permacath bothers her she said but otherwise non-tender, non-purulent. She continues to improve    ICU Vital Signs Last 24 Hrs  T(C): 36.8 (05 Sep 2018 12:03), Max: 36.8 (04 Sep 2018 18:35)  T(F): 98.2 (05 Sep 2018 12:03), Max: 98.3 (05 Sep 2018 05:10)  HR: 73 (05 Sep 2018 12:03) (70 - 82)  BP: 144/82 (05 Sep 2018 12:03) (136/77 - 158/90)  BP(mean): --  ABP: --  ABP(mean): --  RR: 18 (05 Sep 2018 12:03) (18 - 19)  SpO2: 96% (05 Sep 2018 08:25) (94% - 98%)    PHYSICAL EXAM:  GENERAL: NAD, AOX3  HEAD:  Atraumatic, Normocephalic  EYES: EOMI, PERRLA, conjunctiva and sclera clear  ENMT: Moist mucous membranes  CHEST/LUNG: Decreased crackles right and left lung base  HEART: Regular rate and rhythm; No murmurs, rubs, or gallops + Permacath   ABDOMEN: Soft, Nontender, Nondistended; Bowel sounds present  EXTREMITIES:  2+ Peripheral Pulses, No clubbing, cyanosis, or edema    MEDICATIONS  (STANDING):  aspirin enteric coated 81 milliGRAM(s) Oral daily  calcitriol   Capsule 0.25 MICROGram(s) Oral two times a day  calcium carbonate   1250 mG (OsCal) 1 Tablet(s) Oral three times a day  carvedilol 12.5 milliGRAM(s) Oral every 12 hours  chlorhexidine 2% Cloths 1 Application(s) Topical <User Schedule>  dextrose 5%. 1000 milliLiter(s) (50 mL/Hr) IV Continuous <Continuous>  dextrose 50% Injectable 12.5 Gram(s) IV Push once  dextrose 50% Injectable 25 Gram(s) IV Push once  dextrose 50% Injectable 25 Gram(s) IV Push once  docusate sodium 100 milliGRAM(s) Oral two times a day  ferrous    sulfate 325 milliGRAM(s) Oral daily  heparin  Injectable 5000 Unit(s) SubCutaneous every 12 hours  insulin lispro (HumaLOG) corrective regimen sliding scale   SubCutaneous three times a day before meals  insulin lispro (HumaLOG) corrective regimen sliding scale   SubCutaneous at bedtime  piperacillin/tazobactam IVPB. 2.25 Gram(s) IV Intermittent every 12 hours  sacubitril 24 mG/valsartan 26 mG 1 Tablet(s) Oral two times a day  sodium chloride 0.9% 1000 milliLiter(s) (43 mL/Hr) IV Continuous <Continuous>    MEDICATIONS  (PRN):  acetaminophen   Tablet 650 milliGRAM(s) Oral every 6 hours PRN For Temp greater than 38 C (100.4 F)  acetaminophen   Tablet. 650 milliGRAM(s) Oral every 6 hours PRN Mild Pain (1 - 3)  dextrose 40% Gel 15 Gram(s) Oral once PRN Blood Glucose LESS THAN 70 milliGRAM(s)/deciliter  glucagon  Injectable 1 milliGRAM(s) IntraMuscular once PRN Glucose LESS THAN 70 milligrams/deciliter  morphine  - Injectable 2 milliGRAM(s) IV Push every 4 hours PRN Severe Pain (7 - 10)  oxyCODONE    5 mG/acetaminophen 325 mG 2 Tablet(s) Oral every 4 hours PRN Moderate Pain (4 - 6)  senna 2 Tablet(s) Oral at bedtime PRN Constipation    LABS:                        10.6   4.0   )-----------( 258      ( 05 Sep 2018 08:03 )             35.2     09-05    134<L>  |  93<L>  |  19.0  ----------------------------<  145<H>  4.1   |  27.0  |  3.38<H>    Ca    8.5<L>      05 Sep 2018 08:03  Mg     2.0     09-05    TPro  6.2<L>  /  Alb  2.7<L>  /  TBili  0.7  /  DBili  x   /  AST  20  /  ALT  8   /  AlkPhos  84  09-05    RADIOLOGY & ADDITIONAL TESTS:  < from: Xray Chest 1 View- PORTABLE-Urgent (09.02.18 @ 12:57) >  Impression:improvement in BILATERAL lower lobe infiltrates and   atelectasis with small BILATERAL pleural effusions. RIGHT pigtail   catheter has been removed    < end of copied text >

## 2018-09-06 NOTE — PROGRESS NOTE ADULT - ASSESSMENT
ESRD HD on TTS schedule  Using arm successfully   vascular to remove permacath     Anemia - h/h stable     RO - Ca2++ now nL dc IVF w ca   cont Rocaltrol q day  cont Tums     PNA   S/p drainage of effusion   Abx as per ID     Will follow ESRD HD on TTS schedule  her out pt center is [facility] on MWF schedule  Will need to switch at some point  Using arm successfully   vascular to remove permacath     Anemia - h/h stable     RO - Ca2++ now nL dc IVF w ca   cont Rocaltrol q day  cont Tums     PNA   S/p drainage of effusion   Abx as per ID     Will follow

## 2018-09-06 NOTE — PROGRESS NOTE ADULT - SUBJECTIVE AND OBJECTIVE BOX
NEPHROLOGY INTERVAL HPI/OVERNIGHT EVENTS:    Examined on HD  Feeling better  corrected Ca nL  will d/c IVF w Ca 2++    MEDICATIONS  (STANDING):  aspirin enteric coated 81 milliGRAM(s) Oral daily  calcitriol   Capsule 0.25 MICROGram(s) Oral two times a day  calcium carbonate   1250 mG (OsCal) 1 Tablet(s) Oral three times a day  carvedilol 12.5 milliGRAM(s) Oral every 12 hours  chlorhexidine 2% Cloths 1 Application(s) Topical <User Schedule>  dextrose 5%. 1000 milliLiter(s) (50 mL/Hr) IV Continuous <Continuous>  dextrose 50% Injectable 12.5 Gram(s) IV Push once  dextrose 50% Injectable 25 Gram(s) IV Push once  dextrose 50% Injectable 25 Gram(s) IV Push once  docusate sodium 100 milliGRAM(s) Oral two times a day  ferrous    sulfate 325 milliGRAM(s) Oral daily  heparin  Injectable 5000 Unit(s) SubCutaneous every 12 hours  insulin lispro (HumaLOG) corrective regimen sliding scale   SubCutaneous three times a day before meals  insulin lispro (HumaLOG) corrective regimen sliding scale   SubCutaneous at bedtime  piperacillin/tazobactam IVPB. 2.25 Gram(s) IV Intermittent every 12 hours  sacubitril 49 mG/valsartan 51 mG 1 Tablet(s) Oral two times a day    MEDICATIONS  (PRN):  acetaminophen   Tablet 650 milliGRAM(s) Oral every 6 hours PRN For Temp greater than 38 C (100.4 F)  acetaminophen   Tablet. 650 milliGRAM(s) Oral every 6 hours PRN Mild Pain (1 - 3)  dextrose 40% Gel 15 Gram(s) Oral once PRN Blood Glucose LESS THAN 70 milliGRAM(s)/deciliter  glucagon  Injectable 1 milliGRAM(s) IntraMuscular once PRN Glucose LESS THAN 70 milligrams/deciliter  morphine  - Injectable 2 milliGRAM(s) IV Push every 4 hours PRN Severe Pain (7 - 10)  oxyCODONE    5 mG/acetaminophen 325 mG 2 Tablet(s) Oral every 4 hours PRN Moderate Pain (4 - 6)  senna 2 Tablet(s) Oral at bedtime PRN Constipation      Allergies    No Known Allergies    Intolerances        Vital Signs Last 24 Hrs  T(C): 36.4 (06 Sep 2018 13:15), Max: 36.7 (05 Sep 2018 20:04)  T(F): 97.5 (06 Sep 2018 13:15), Max: 98 (05 Sep 2018 20:04)  HR: 76 (06 Sep 2018 13:15) (76 - 81)  BP: 158/88 (06 Sep 2018 13:15) (132/78 - 160/90)  BP(mean): --  RR: 18 (06 Sep 2018 13:15) (18 - 18)  SpO2: 95% (06 Sep 2018 13:15) (95% - 96%)  Daily     Daily Weight in k.9 (06 Sep 2018 13:15)    PHYSICAL EXAM:  EYES:  EOMI  NECK: Supple, No JVD  NERVOUS SYSTEM:  A/O x3,   CHEST:  R base rales occ rhonchi, poor BS bases  HEART:  RRR, gr 2 murmur  ABDOMEN: Soft, NT/ND BS+  EXTREMITIES:  + trace LE edema    LABS:                        10.6   4.0   )-----------( 258      ( 05 Sep 2018 08:03 )             35.2     09-05    134<L>  |  93<L>  |  19.0  ----------------------------<  145<H>  4.1   |  27.0  |  3.38<H>    Ca    8.5<L>      05 Sep 2018 08:03  Mg     2.0     09-05    TPro  6.2<L>  /  Alb  2.7<L>  /  TBili  0.7  /  DBili  x   /  AST  20  /  ALT  8   /  AlkPhos  84  09-05                RADIOLOGY & ADDITIONAL TESTS:

## 2018-09-07 ENCOUNTER — TRANSCRIPTION ENCOUNTER (OUTPATIENT)
Age: 58
End: 2018-09-07

## 2018-09-07 LAB
GLUCOSE BLDC GLUCOMTR-MCNC: 120 MG/DL — HIGH (ref 70–99)
GLUCOSE BLDC GLUCOMTR-MCNC: 163 MG/DL — HIGH (ref 70–99)
GLUCOSE BLDC GLUCOMTR-MCNC: 175 MG/DL — HIGH (ref 70–99)
GLUCOSE BLDC GLUCOMTR-MCNC: 210 MG/DL — HIGH (ref 70–99)

## 2018-09-07 PROCEDURE — 99232 SBSQ HOSP IP/OBS MODERATE 35: CPT

## 2018-09-07 PROCEDURE — 36901 INTRO CATH DIALYSIS CIRCUIT: CPT

## 2018-09-07 PROCEDURE — 36589 REMOVAL TUNNELED CV CATH: CPT

## 2018-09-07 PROCEDURE — 99239 HOSP IP/OBS DSCHRG MGMT >30: CPT

## 2018-09-07 PROCEDURE — 93010 ELECTROCARDIOGRAM REPORT: CPT

## 2018-09-07 RX ORDER — SACUBITRIL AND VALSARTAN 24; 26 MG/1; MG/1
1 TABLET, FILM COATED ORAL
Qty: 0 | Refills: 0 | COMMUNITY
Start: 2018-09-07

## 2018-09-07 RX ORDER — CALCITRIOL 0.5 UG/1
1 CAPSULE ORAL
Qty: 0 | Refills: 0 | COMMUNITY
Start: 2018-09-07

## 2018-09-07 RX ORDER — PANTOPRAZOLE SODIUM 20 MG/1
40 TABLET, DELAYED RELEASE ORAL ONCE
Qty: 0 | Refills: 0 | Status: COMPLETED | OUTPATIENT
Start: 2018-09-07 | End: 2018-09-07

## 2018-09-07 RX ORDER — PANTOPRAZOLE SODIUM 20 MG/1
40 TABLET, DELAYED RELEASE ORAL
Qty: 0 | Refills: 0 | Status: DISCONTINUED | OUTPATIENT
Start: 2018-09-07 | End: 2018-09-07

## 2018-09-07 RX ORDER — PANTOPRAZOLE SODIUM 20 MG/1
40 TABLET, DELAYED RELEASE ORAL
Qty: 0 | Refills: 0 | Status: DISCONTINUED | OUTPATIENT
Start: 2018-09-08 | End: 2018-09-08

## 2018-09-07 RX ORDER — ONDANSETRON 8 MG/1
4 TABLET, FILM COATED ORAL ONCE
Qty: 0 | Refills: 0 | Status: COMPLETED | OUTPATIENT
Start: 2018-09-07 | End: 2018-09-07

## 2018-09-07 RX ORDER — CALCIUM CARBONATE 500(1250)
1 TABLET ORAL
Qty: 0 | Refills: 0 | DISCHARGE
Start: 2018-09-07

## 2018-09-07 RX ORDER — PANTOPRAZOLE SODIUM 20 MG/1
1 TABLET, DELAYED RELEASE ORAL
Qty: 0 | Refills: 0 | COMMUNITY
Start: 2018-09-07

## 2018-09-07 RX ADMIN — Medication 1 TABLET(S): at 13:19

## 2018-09-07 RX ADMIN — Medication 100 MILLIGRAM(S): at 17:37

## 2018-09-07 RX ADMIN — PIPERACILLIN AND TAZOBACTAM 200 GRAM(S): 4; .5 INJECTION, POWDER, LYOPHILIZED, FOR SOLUTION INTRAVENOUS at 06:25

## 2018-09-07 RX ADMIN — ONDANSETRON 4 MILLIGRAM(S): 8 TABLET, FILM COATED ORAL at 08:55

## 2018-09-07 RX ADMIN — Medication 2: at 07:50

## 2018-09-07 RX ADMIN — CARVEDILOL PHOSPHATE 12.5 MILLIGRAM(S): 80 CAPSULE, EXTENDED RELEASE ORAL at 06:25

## 2018-09-07 RX ADMIN — Medication 100 MILLIGRAM(S): at 06:25

## 2018-09-07 RX ADMIN — HEPARIN SODIUM 5000 UNIT(S): 5000 INJECTION INTRAVENOUS; SUBCUTANEOUS at 17:37

## 2018-09-07 RX ADMIN — Medication 0: at 23:03

## 2018-09-07 RX ADMIN — Medication 1 TABLET(S): at 06:25

## 2018-09-07 RX ADMIN — PIPERACILLIN AND TAZOBACTAM 200 GRAM(S): 4; .5 INJECTION, POWDER, LYOPHILIZED, FOR SOLUTION INTRAVENOUS at 17:37

## 2018-09-07 RX ADMIN — SACUBITRIL AND VALSARTAN 1 TABLET(S): 24; 26 TABLET, FILM COATED ORAL at 06:25

## 2018-09-07 RX ADMIN — HEPARIN SODIUM 5000 UNIT(S): 5000 INJECTION INTRAVENOUS; SUBCUTANEOUS at 06:25

## 2018-09-07 RX ADMIN — CHLORHEXIDINE GLUCONATE 1 APPLICATION(S): 213 SOLUTION TOPICAL at 06:26

## 2018-09-07 RX ADMIN — Medication 325 MILLIGRAM(S): at 13:19

## 2018-09-07 RX ADMIN — CALCITRIOL 0.25 MICROGRAM(S): 0.5 CAPSULE ORAL at 18:25

## 2018-09-07 RX ADMIN — SACUBITRIL AND VALSARTAN 1 TABLET(S): 24; 26 TABLET, FILM COATED ORAL at 17:37

## 2018-09-07 RX ADMIN — CALCITRIOL 0.25 MICROGRAM(S): 0.5 CAPSULE ORAL at 06:25

## 2018-09-07 RX ADMIN — Medication 1 TABLET(S): at 23:02

## 2018-09-07 RX ADMIN — PANTOPRAZOLE SODIUM 40 MILLIGRAM(S): 20 TABLET, DELAYED RELEASE ORAL at 08:52

## 2018-09-07 RX ADMIN — Medication 81 MILLIGRAM(S): at 13:19

## 2018-09-07 RX ADMIN — CARVEDILOL PHOSPHATE 12.5 MILLIGRAM(S): 80 CAPSULE, EXTENDED RELEASE ORAL at 17:37

## 2018-09-07 NOTE — PROGRESS NOTE ADULT - ASSESSMENT
ESRD   HD tomorrow   Using arm now   ?? access angio via Vascular before removing permacath ( as per their last note ).    Anemia - hgb stable . CBC in am     RO - low calcium and phos   Added Rocaltrol bid   Add Tums 1 gram tis between meals   Repeat labs in am     PNA   S/p drainage of effusion   Abx as per ID

## 2018-09-07 NOTE — DISCHARGE NOTE ADULT - MEDICATION SUMMARY - MEDICATIONS TO TAKE
I will START or STAY ON the medications listed below when I get home from the hospital:    aspirin 81 mg oral delayed release tablet  -- 1 tab(s) by mouth once a day  -- Indication: For CHF (congestive heart failure)    sacubitril-valsartan 49 mg-51 mg oral tablet  -- 1 tab(s) by mouth 2 times a day  -- Indication: For CHF (congestive heart failure)    calcium carbonate 1250 mg (500 mg elemental calcium) oral tablet  -- 1 tab(s) by mouth 3 times a day  -- Indication: For End stage renal disease on dialysis    insulin glargine 100 units/mL subcutaneous solution  -- 16 unit(s) subcutaneous once a day (at bedtime)   -- Do not drink alcoholic beverages when taking this medication.  It is very important that you take or use this exactly as directed.  Do not skip doses or discontinue unless directed by your doctor.  Keep in refrigerator.  Do not freeze.    -- Indication: For Type 2 diabetes mellitus with chronic kidney disease on chronic dialysis, without long-term current use of insulin    Coreg 12.5 mg oral tablet  -- 1 tab(s) by mouth 2 times a day   -- It is very important that you take or use this exactly as directed.  Do not skip doses or discontinue unless directed by your doctor.  May cause drowsiness.  Alcohol may intensify this effect.  Use care when operating dangerous machinery.  Some non-prescription drugs may aggravate your condition.  Read all labels carefully.  If a warning appears, check with your doctor before taking.  Take with food or milk.    -- Indication: For CHF (congestive heart failure)    FeroSul 325 mg (65 mg elemental iron) oral tablet  -- 1 tab(s) by mouth once a day   -- Indication: For iron deficiency anemia    Colace 100 mg oral capsule  -- 1 cap(s) by mouth 2 times a day, As Needed -for constipation   -- Indication: For Constipation    senna oral tablet  -- 2 tab(s) by mouth once a day (at bedtime), As needed, Constipation  -- Indication: For Constipation    pantoprazole 40 mg oral delayed release tablet  -- 1 tab(s) by mouth once a day (before a meal)  -- Indication: For GERD    calcitriol 0.25 mcg oral capsule  -- 1 cap(s) by mouth 2 times a day  -- Indication: For ESRD

## 2018-09-07 NOTE — DISCHARGE NOTE ADULT - CARE PROVIDERS DIRECT ADDRESSES
[email or web address]

## 2018-09-07 NOTE — DISCHARGE NOTE ADULT - HOSPITAL COURSE
The patient is a 58 year old female with a history of ESRD on HD MWF, afib not on anticoagulation given history of GIB, chronic diastolic CHF who presented to the ER with complaints of worsening shortness of breath. CT on admission consistent with large right pleural effusion now status post pigtail placement by CTS.  Code sepsis called for fever. Started on IV zosyn, cultures ordered, ID consulted. Chest tube removed by CT surgery on 8/2. Vascular surgery consulted for permacath removal.    1. Sepsis secondary to right upper lobe pneumonia with possible parapneumonic effusion  - Blood cultures on 8/30 negative x 2  - ID on consult; continue abx for a total of 7 days of zosyn today day 6; will finish tomorrow and D/C abx  - appreciate ID consult  - Status post chest tube removal on 9/2  - pleural fluid cultures and acid fast staining negative thus far.    2. ESRD on HD MWF: Renal following. Possibly removal of permacath in chest wall with vascular given that her AVF is functioning and she used it for dialysis on 9/1 as well as yesterday  - vascular would like to take her for graft study tomorrow and then if clear patient can have permacath removed.    3. Acute on chronic systolic CHF with NICM: Echocardiogram with  global hypokinesia with low ef of 25%. Spoke with cardio, cath early this  year with non obstructive CAD. Added entresto; on BB.     4. AFib: Rate controlled; Unable to tolerate coumadin due to GI bleed. Was being evaluated for Watchmen procedure, however patient missed appointment in office.    5. Pericardial effusion - hx of uremic pericarditis, currently stable.  No pericardial effusion on echo; colchicine discontinued.    6. Diabetes mellitus type 2: HSS> MOnitor bsl.    7. Hypocalcemia: Improved, continue ca carb and calcitriol/ Repeat BMP this morning.    ICU Vital Signs Last 24 Hrs  T(C): 37.2 (07 Sep 2018 07:58), Max: 37.2 (06 Sep 2018 16:00)  T(F): 99 (07 Sep 2018 07:58), Max: 99 (07 Sep 2018 07:58)  HR: 78 (07 Sep 2018 07:58) (76 - 82)  BP: 149/85 (07 Sep 2018 07:58) (137/87 - 163/91)  BP(mean): --  ABP: --  ABP(mean): --  RR: 19 (07 Sep 2018 07:58) (18 - 19)  SpO2: 98% (07 Sep 2018 07:58) (95% - 99%)    PHYSICAL EXAM:  GENERAL: NAD, AOX3  HEAD:  Atraumatic, Normocephalic  EYES: EOMI, PERRLA, conjunctiva and sclera clear  ENMT: Moist mucous membranes  CHEST/LUNG: Decreased crackles right and left lung base  HEART: Regular rate and rhythm; No murmurs, rubs, or gallops; permacath removed  ABDOMEN: Soft, Nontender, Nondistended; Bowel sounds present  EXTREMITIES:  2+ Peripheral Pulses, No clubbing, cyanosis, or edema    discharge time 41 min

## 2018-09-07 NOTE — PROGRESS NOTE ADULT - SUBJECTIVE AND OBJECTIVE BOX
NEPHROLOGY INTERVAL HPI/OVERNIGHT EVENTS:    Feels well   Afebrile , VSS     MEDICATIONS  (STANDING):  aspirin enteric coated 81 milliGRAM(s) Oral daily  calcitriol   Capsule 0.25 MICROGram(s) Oral two times a day  calcium carbonate   1250 mG (OsCal) 1 Tablet(s) Oral three times a day  carvedilol 12.5 milliGRAM(s) Oral every 12 hours  chlorhexidine 2% Cloths 1 Application(s) Topical <User Schedule>  dextrose 5%. 1000 milliLiter(s) (50 mL/Hr) IV Continuous <Continuous>  dextrose 50% Injectable 12.5 Gram(s) IV Push once  dextrose 50% Injectable 25 Gram(s) IV Push once  dextrose 50% Injectable 25 Gram(s) IV Push once  docusate sodium 100 milliGRAM(s) Oral two times a day  ferrous    sulfate 325 milliGRAM(s) Oral daily  heparin  Injectable 5000 Unit(s) SubCutaneous every 12 hours  insulin lispro (HumaLOG) corrective regimen sliding scale   SubCutaneous three times a day before meals  insulin lispro (HumaLOG) corrective regimen sliding scale   SubCutaneous at bedtime  piperacillin/tazobactam IVPB. 2.25 Gram(s) IV Intermittent every 12 hours  sacubitril 49 mG/valsartan 51 mG 1 Tablet(s) Oral two times a day    MEDICATIONS  (PRN):  acetaminophen   Tablet 650 milliGRAM(s) Oral every 6 hours PRN For Temp greater than 38 C (100.4 F)  acetaminophen   Tablet. 650 milliGRAM(s) Oral every 6 hours PRN Mild Pain (1 - 3)  dextrose 40% Gel 15 Gram(s) Oral once PRN Blood Glucose LESS THAN 70 milliGRAM(s)/deciliter  glucagon  Injectable 1 milliGRAM(s) IntraMuscular once PRN Glucose LESS THAN 70 milligrams/deciliter  senna 2 Tablet(s) Oral at bedtime PRN Constipation      Allergies    No Known Allergies    Intolerances          Vital Signs Last 24 Hrs  T(C): 37.2 (07 Sep 2018 07:58), Max: 37.2 (06 Sep 2018 16:00)  T(F): 99 (07 Sep 2018 07:58), Max: 99 (07 Sep 2018 07:58)  HR: 78 (07 Sep 2018 07:58) (78 - 82)  BP: 149/85 (07 Sep 2018 07:58) (137/87 - 163/91)  BP(mean): --  RR: 19 (07 Sep 2018 07:58) (18 - 19)  SpO2: 98% (07 Sep 2018 07:58) (97% - 99%)  Daily     Daily Weight in k.9 (07 Sep 2018 12:21)  I&O's Detail    06 Sep 2018 07:01  -  07 Sep 2018 07:00  --------------------------------------------------------  IN:    Oral Fluid: 240 mL  Total IN: 240 mL    OUT:    Other: 2500 mL    Stool: 2 mL  Total OUT: 2502 mL    Total NET: -2262 mL        I&O's Summary    06 Sep 2018 07:01  -  07 Sep 2018 07:00  --------------------------------------------------------  IN: 240 mL / OUT: 2502 mL / NET: -2262 mL        PHYSICAL EXAM:  GENERAL: NAD,   EYES:  conjunctiva and sclera clear  NECK: Supple, No JVD/Bruit  NERVOUS SYSTEM:  A/O x3,   CHEST:  No rales or rhonchi; poor BS bases; R CT noted  HEART:  RRR, No murmur  ABDOMEN: Soft, NT/ND BS+  EXTREMITIES:  No Edema; L Ua AVF good thrill and bruit    LABS:      noted                 RADIOLOGY & ADDITIONAL TESTS:

## 2018-09-07 NOTE — DISCHARGE NOTE ADULT - SECONDARY DIAGNOSIS.
End stage renal disease on dialysis Acute on chronic diastolic congestive heart failure Paroxysmal atrial fibrillation Pericardial effusion Type 2 diabetes mellitus with chronic kidney disease on chronic dialysis, without long-term current use of insulin

## 2018-09-07 NOTE — DISCHARGE NOTE ADULT - CARE PLAN
Principal Discharge DX:	Parapneumonic effusion  Goal:	resolved  Assessment and plan of treatment:	You received 7 days of antibiotics as directed by the infectious disease specialist.  Secondary Diagnosis:	End stage renal disease on dialysis  Assessment and plan of treatment:	continue calcitriol, continue with dialysis. Your permacath was removed on this admission. Follow up with your nephrologist. Continue on a ESRD diet.  Secondary Diagnosis:	Acute on chronic diastolic congestive heart failure  Assessment and plan of treatment:	You were started on entresto on this admission. continue carvedilol and aspirin as well.  Secondary Diagnosis:	Paroxysmal atrial fibrillation  Assessment and plan of treatment:	you are rate controlled with carvedilol, continue carvedilol and aspirin as well (for anticoagulation)  Secondary Diagnosis:	Pericardial effusion  Assessment and plan of treatment:	stop taking colcrys as your pericarditis has resolved.  Secondary Diagnosis:	Type 2 diabetes mellitus with chronic kidney disease on chronic dialysis, without long-term current use of insulin  Assessment and plan of treatment:	resume home dose insulin on discharge. continue diabetic diet at home.

## 2018-09-07 NOTE — PROGRESS NOTE ADULT - SUBJECTIVE AND OBJECTIVE BOX
POST-OPERATIVE NOTE    PROCEDURE: s/p graftogram and permacath removal.    INTERVAL HPI: Endorses LUE pain at graftogram site. Tolerating PO intake. Voiding. -Flatus, -BM. Denies N/V/CP/SOB.     Vital Signs Last 24 Hrs  T(C): 37 (07 Sep 2018 17:03), Max: 37.2 (07 Sep 2018 07:58)  T(F): 98.6 (07 Sep 2018 17:03), Max: 99 (07 Sep 2018 07:58)  HR: 80 (07 Sep 2018 17:03) (78 - 81)  BP: 151/92 (07 Sep 2018 17:03) (149/85 - 153/89)  BP(mean): --  RR: 19 (07 Sep 2018 17:03) (18 - 19)  SpO2: 95% (07 Sep 2018 17:03) (95% - 98%)    PE  Gen: AAO x3, NAD  Pulm: Symmetrical chest rise  Abd: Soft, NT, ND, -R/-G  Vasc: 2+ Radial, palpable thrill at graft site. ACE wrapping in place. No hematoma noted. Permacath removal site c/d/i  Neuro: No focal neurological deficits    Plan:  -can begin dialyzing through graft 9/8

## 2018-09-07 NOTE — DISCHARGE NOTE ADULT - CARE PROVIDER_API CALL
Gissel Salazar (), Cardiology; Internal Medicine  [address]  [address]  [place] [address]  Phone: [phone]  Fax: [phone]    Arturo Segundo), Vascular Surgery  [address] [place] [address]  Phone: [phone]  Fax: [phone]

## 2018-09-07 NOTE — CHART NOTE - NSCHARTNOTEFT_GEN_A_CORE
Progress Note:   · Provider Specialty	Hospitalist    Reason for Admission:    Reason for Admission:  · Reason for Admission	Shortness of breath      · Subjective and Objective:   CC: Pleural effusion    INTERVAL HPI/OVERNIGHT EVENTS: No complaints this morning, denies pain. Ambulating with PT. permacath removed today after graftogram today    ICU Vital Signs Last 24 Hrs  T(C): 36.8 (05 Sep 2018 12:03), Max: 36.8 (04 Sep 2018 18:35)  T(F): 98.2 (05 Sep 2018 12:03), Max: 98.3 (05 Sep 2018 05:10)  HR: 73 (05 Sep 2018 12:03) (70 - 82)  BP: 144/82 (05 Sep 2018 12:03) (136/77 - 158/90)  BP(mean): --  ABP: --  ABP(mean): --  RR: 18 (05 Sep 2018 12:03) (18 - 19)  SpO2: 96% (05 Sep 2018 08:25) (94% - 98%)    PHYSICAL EXAM:  GENERAL: NAD, AOX3  HEAD:  Atraumatic, Normocephalic  EYES: EOMI, PERRLA, conjunctiva and sclera clear  ENMT: Moist mucous membranes  CHEST/LUNG: Decreased crackles right and left lung base  HEART: Regular rate and rhythm; No murmurs, rubs, or gallops; previous permacath site clean, permacath removed  ABDOMEN: Soft, Nontender, Nondistended; Bowel sounds present  EXTREMITIES:  2+ Peripheral Pulses, No clubbing, cyanosis, or edema    MEDICATIONS  (STANDING):  aspirin enteric coated 81 milliGRAM(s) Oral daily  calcitriol   Capsule 0.25 MICROGram(s) Oral two times a day  calcium carbonate   1250 mG (OsCal) 1 Tablet(s) Oral three times a day  carvedilol 12.5 milliGRAM(s) Oral every 12 hours  chlorhexidine 2% Cloths 1 Application(s) Topical <User Schedule>  dextrose 5%. 1000 milliLiter(s) (50 mL/Hr) IV Continuous <Continuous>  dextrose 50% Injectable 12.5 Gram(s) IV Push once  dextrose 50% Injectable 25 Gram(s) IV Push once  dextrose 50% Injectable 25 Gram(s) IV Push once  docusate sodium 100 milliGRAM(s) Oral two times a day  ferrous    sulfate 325 milliGRAM(s) Oral daily  heparin  Injectable 5000 Unit(s) SubCutaneous every 12 hours  insulin lispro (HumaLOG) corrective regimen sliding scale   SubCutaneous three times a day before meals  insulin lispro (HumaLOG) corrective regimen sliding scale   SubCutaneous at bedtime  piperacillin/tazobactam IVPB. 2.25 Gram(s) IV Intermittent every 12 hours  sacubitril 24 mG/valsartan 26 mG 1 Tablet(s) Oral two times a day  sodium chloride 0.9% 1000 milliLiter(s) (43 mL/Hr) IV Continuous <Continuous>    MEDICATIONS  (PRN):  acetaminophen   Tablet 650 milliGRAM(s) Oral every 6 hours PRN For Temp greater than 38 C (100.4 F)  acetaminophen   Tablet. 650 milliGRAM(s) Oral every 6 hours PRN Mild Pain (1 - 3)  dextrose 40% Gel 15 Gram(s) Oral once PRN Blood Glucose LESS THAN 70 milliGRAM(s)/deciliter  glucagon  Injectable 1 milliGRAM(s) IntraMuscular once PRN Glucose LESS THAN 70 milligrams/deciliter  morphine  - Injectable 2 milliGRAM(s) IV Push every 4 hours PRN Severe Pain (7 - 10)  oxyCODONE    5 mG/acetaminophen 325 mG 2 Tablet(s) Oral every 4 hours PRN Moderate Pain (4 - 6)  senna 2 Tablet(s) Oral at bedtime PRN Constipation    LABS:  RADIOLOGY & ADDITIONAL TESTS:  < from: Xray Chest 1 View- PORTABLE-Urgent (09.02.18 @ 12:57) >  Impression:improvement in BILATERAL lower lobe infiltrates and   atelectasis with small BILATERAL pleural effusions. RIGHT pigtail   catheter has been removed    < end of copied text >    Assessment and Plan:   · Assessment	  The patient is a 58 year old female with a history of ESRD on HD MWF, afib not on anticoagulation given history of GIB, chronic diastolic CHF who presented to the ER with complaints of worsening shortness of breath. CT on admission consistent with large right pleural effusion now status post pigtail placement by CTS.  Code sepsis called for fever. Started on IV zosyn, cultures ordered, ID consulted. Chest tube removed by CT surgery on 8/2. Vascular surgery consulted for permacath removal.     Assessment/Plan:    1. Sepsis secondary to right upper lobe pneumonia with possible parapneumonic effusion  - Blood cultures on 8/30 negative x 2  - ID on consult; continue abx for a total of 7 days of zosyn today day 6; will finish tomorrow and D/C abx  - appreciate ID consult  - Status post chest tube removal on 9/2  - pleural fluid cultures and acid fast staining negative thus far.    2. ESRD on HD MWF: Renal following. Possibly removal of permacath in chest wall with vascular given that her AVF is functioning and she used it for dialysis on 9/1 as well as yesterday  - permacath removed  - continue scheduled dialysis  - can discharge today    3. Acute on chronic systolic CHF with NICM: Echocardiogram with  global hypokinesia with low ef of 25%. Spoke with cardio, cath early this  year with non obstructive CAD. Added entresto; on BB.     4. AFib: Rate controlled; Unable to tolerate coumadin due to GI bleed. Was being evaluated for Watchmen procedure, however patient missed appointment in office.    5. Pericardial effusion - hx of uremic pericarditis, currently stable.  No pericardial effusion on echo; colchicine discontinued.    6. Diabetes mellitus type 2: HSS> MOnitor bsl.    7. Hypocalcemia: Improved, continue ca carb and calcitriol/ Repeat BMP this morning.    VTE- Heparin Subcut    Attending Attestation:   35 minutes spent on total encounter; more than 50% of the visit was spent counseling and/or coordinating care by the attending physician.

## 2018-09-07 NOTE — PROGRESS NOTE ADULT - PROVIDER SPECIALTY LIST ADULT
Nephrology Quality 128: Preventive Care And Screening: Body Mass Index (Bmi) Screening And Follow-Up Plan: BMI is documented within normal parameters and no follow-up plan is required.

## 2018-09-07 NOTE — DISCHARGE NOTE ADULT - MEDICATION SUMMARY - MEDICATIONS TO STOP TAKING
I will STOP taking the medications listed below when I get home from the hospital:     mg oral tablet  -- 1 tab(s) by mouth every 6 hours, As needed, Moderate pain    Colcrys 0.6 mg oral tablet  -- 1 tab(s) by mouth once a day

## 2018-09-07 NOTE — DISCHARGE NOTE ADULT - PLAN OF CARE
resolved You received 7 days of antibiotics as directed by the infectious disease specialist. continue calcitriol, continue with dialysis. Your permacath was removed on this admission. Follow up with your nephrologist. Continue on a ESRD diet. You were started on entresto on this admission. continue carvedilol and aspirin as well. you are rate controlled with carvedilol, continue carvedilol and aspirin as well (for anticoagulation) stop taking colcrys as your pericarditis has resolved. resume home dose insulin on discharge. continue diabetic diet at home.

## 2018-09-07 NOTE — DISCHARGE NOTE ADULT - PATIENT PORTAL LINK FT
You can access the [facility] Patient Portal, offered by [facility], by registering with the following website: [email or web address]

## 2018-09-08 VITALS
SYSTOLIC BLOOD PRESSURE: 151 MMHG | WEIGHT: 146.61 LBS | DIASTOLIC BLOOD PRESSURE: 81 MMHG | RESPIRATION RATE: 18 BRPM | HEART RATE: 79 BPM | OXYGEN SATURATION: 96 % | TEMPERATURE: 99 F

## 2018-09-08 LAB
ANION GAP SERPL CALC-SCNC: 13 MMOL/L — SIGNIFICANT CHANGE UP (ref 5–17)
BUN SERPL-MCNC: 25 MG/DL — HIGH (ref 8–20)
CALCIUM SERPL-MCNC: 8.7 MG/DL — SIGNIFICANT CHANGE UP (ref 8.6–10.2)
CHLORIDE SERPL-SCNC: 96 MMOL/L — LOW (ref 98–107)
CO2 SERPL-SCNC: 26 MMOL/L — SIGNIFICANT CHANGE UP (ref 22–29)
CREAT SERPL-MCNC: 4.77 MG/DL — HIGH (ref 0.5–1.3)
GLUCOSE BLDC GLUCOMTR-MCNC: 167 MG/DL — HIGH (ref 70–99)
GLUCOSE BLDC GLUCOMTR-MCNC: 89 MG/DL — SIGNIFICANT CHANGE UP (ref 70–99)
GLUCOSE SERPL-MCNC: 164 MG/DL — HIGH (ref 70–115)
HCT VFR BLD CALC: 32.3 % — LOW (ref 37–47)
HGB BLD-MCNC: 9.8 G/DL — LOW (ref 12–16)
MCHC RBC-ENTMCNC: 28.4 PG — SIGNIFICANT CHANGE UP (ref 27–31)
MCHC RBC-ENTMCNC: 30.3 G/DL — LOW (ref 32–36)
MCV RBC AUTO: 93.6 FL — SIGNIFICANT CHANGE UP (ref 81–99)
PHOSPHATE SERPL-MCNC: 4 MG/DL — SIGNIFICANT CHANGE UP (ref 2.4–4.7)
PLATELET # BLD AUTO: 300 K/UL — SIGNIFICANT CHANGE UP (ref 150–400)
POTASSIUM SERPL-MCNC: 4.6 MMOL/L — SIGNIFICANT CHANGE UP (ref 3.5–5.3)
POTASSIUM SERPL-SCNC: 4.6 MMOL/L — SIGNIFICANT CHANGE UP (ref 3.5–5.3)
RBC # BLD: 3.45 M/UL — LOW (ref 4.4–5.2)
RBC # FLD: 14.9 % — SIGNIFICANT CHANGE UP (ref 11–15.6)
SODIUM SERPL-SCNC: 135 MMOL/L — SIGNIFICANT CHANGE UP (ref 135–145)
WBC # BLD: 5.2 K/UL — SIGNIFICANT CHANGE UP (ref 4.8–10.8)
WBC # FLD AUTO: 5.2 K/UL — SIGNIFICANT CHANGE UP (ref 4.8–10.8)

## 2018-09-08 PROCEDURE — 83690 ASSAY OF LIPASE: CPT

## 2018-09-08 PROCEDURE — 80053 COMPREHEN METABOLIC PANEL: CPT

## 2018-09-08 PROCEDURE — 84443 ASSAY THYROID STIM HORMONE: CPT

## 2018-09-08 PROCEDURE — 82550 ASSAY OF CK (CPK): CPT

## 2018-09-08 PROCEDURE — 85610 PROTHROMBIN TIME: CPT

## 2018-09-08 PROCEDURE — 88305 TISSUE EXAM BY PATHOLOGIST: CPT

## 2018-09-08 PROCEDURE — 84439 ASSAY OF FREE THYROXINE: CPT

## 2018-09-08 PROCEDURE — 84157 ASSAY OF PROTEIN OTHER: CPT

## 2018-09-08 PROCEDURE — 82652 VIT D 1 25-DIHYDROXY: CPT

## 2018-09-08 PROCEDURE — 93005 ELECTROCARDIOGRAM TRACING: CPT

## 2018-09-08 PROCEDURE — 83880 ASSAY OF NATRIURETIC PEPTIDE: CPT

## 2018-09-08 PROCEDURE — 87641 MR-STAPH DNA AMP PROBE: CPT

## 2018-09-08 PROCEDURE — 87116 MYCOBACTERIA CULTURE: CPT

## 2018-09-08 PROCEDURE — 71250 CT THORAX DX C-: CPT

## 2018-09-08 PROCEDURE — 76080 X-RAY EXAM OF FISTULA: CPT

## 2018-09-08 PROCEDURE — 82533 TOTAL CORTISOL: CPT

## 2018-09-08 PROCEDURE — 99261: CPT

## 2018-09-08 PROCEDURE — 82553 CREATINE MB FRACTION: CPT

## 2018-09-08 PROCEDURE — 97110 THERAPEUTIC EXERCISES: CPT

## 2018-09-08 PROCEDURE — 99285 EMERGENCY DEPT VISIT HI MDM: CPT

## 2018-09-08 PROCEDURE — 87102 FUNGUS ISOLATION CULTURE: CPT

## 2018-09-08 PROCEDURE — 87015 SPECIMEN INFECT AGNT CONCNTJ: CPT

## 2018-09-08 PROCEDURE — 83605 ASSAY OF LACTIC ACID: CPT

## 2018-09-08 PROCEDURE — 81001 URINALYSIS AUTO W/SCOPE: CPT

## 2018-09-08 PROCEDURE — 87205 SMEAR GRAM STAIN: CPT

## 2018-09-08 PROCEDURE — T1013: CPT

## 2018-09-08 PROCEDURE — C1769: CPT

## 2018-09-08 PROCEDURE — 93306 TTE W/DOPPLER COMPLETE: CPT

## 2018-09-08 PROCEDURE — 84145 PROCALCITONIN (PCT): CPT

## 2018-09-08 PROCEDURE — 87070 CULTURE OTHR SPECIMN AEROBIC: CPT

## 2018-09-08 PROCEDURE — 97163 PT EVAL HIGH COMPLEX 45 MIN: CPT

## 2018-09-08 PROCEDURE — 85027 COMPLETE CBC AUTOMATED: CPT

## 2018-09-08 PROCEDURE — 87206 SMEAR FLUORESCENT/ACID STAI: CPT

## 2018-09-08 PROCEDURE — 84100 ASSAY OF PHOSPHORUS: CPT

## 2018-09-08 PROCEDURE — 82945 GLUCOSE OTHER FLUID: CPT

## 2018-09-08 PROCEDURE — 71046 X-RAY EXAM CHEST 2 VIEWS: CPT

## 2018-09-08 PROCEDURE — 83036 HEMOGLOBIN GLYCOSYLATED A1C: CPT

## 2018-09-08 PROCEDURE — 82306 VITAMIN D 25 HYDROXY: CPT

## 2018-09-08 PROCEDURE — 36415 COLL VENOUS BLD VENIPUNCTURE: CPT

## 2018-09-08 PROCEDURE — 94640 AIRWAY INHALATION TREATMENT: CPT

## 2018-09-08 PROCEDURE — 83986 ASSAY PH BODY FLUID NOS: CPT

## 2018-09-08 PROCEDURE — 93970 EXTREMITY STUDY: CPT

## 2018-09-08 PROCEDURE — 88112 CYTOPATH CELL ENHANCE TECH: CPT

## 2018-09-08 PROCEDURE — 84484 ASSAY OF TROPONIN QUANT: CPT

## 2018-09-08 PROCEDURE — 82042 OTHER SOURCE ALBUMIN QUAN EA: CPT

## 2018-09-08 PROCEDURE — 71045 X-RAY EXAM CHEST 1 VIEW: CPT

## 2018-09-08 PROCEDURE — 87040 BLOOD CULTURE FOR BACTERIA: CPT

## 2018-09-08 PROCEDURE — 87086 URINE CULTURE/COLONY COUNT: CPT

## 2018-09-08 PROCEDURE — C1894: CPT

## 2018-09-08 PROCEDURE — 80048 BASIC METABOLIC PNL TOTAL CA: CPT

## 2018-09-08 PROCEDURE — 83735 ASSAY OF MAGNESIUM: CPT

## 2018-09-08 PROCEDURE — 76942 ECHO GUIDE FOR BIOPSY: CPT

## 2018-09-08 PROCEDURE — 83615 LACTATE (LD) (LDH) ENZYME: CPT

## 2018-09-08 PROCEDURE — 82962 GLUCOSE BLOOD TEST: CPT

## 2018-09-08 PROCEDURE — 99239 HOSP IP/OBS DSCHRG MGMT >30: CPT

## 2018-09-08 PROCEDURE — 89051 BODY FLUID CELL COUNT: CPT

## 2018-09-08 PROCEDURE — 85730 THROMBOPLASTIN TIME PARTIAL: CPT

## 2018-09-08 PROCEDURE — 87075 CULTR BACTERIA EXCEPT BLOOD: CPT

## 2018-09-08 PROCEDURE — 97116 GAIT TRAINING THERAPY: CPT

## 2018-09-08 PROCEDURE — 87640 STAPH A DNA AMP PROBE: CPT

## 2018-09-08 RX ADMIN — PANTOPRAZOLE SODIUM 40 MILLIGRAM(S): 20 TABLET, DELAYED RELEASE ORAL at 08:57

## 2018-09-08 RX ADMIN — Medication 81 MILLIGRAM(S): at 13:17

## 2018-09-08 RX ADMIN — CARVEDILOL PHOSPHATE 12.5 MILLIGRAM(S): 80 CAPSULE, EXTENDED RELEASE ORAL at 05:57

## 2018-09-08 RX ADMIN — HEPARIN SODIUM 5000 UNIT(S): 5000 INJECTION INTRAVENOUS; SUBCUTANEOUS at 05:57

## 2018-09-08 RX ADMIN — Medication 2: at 08:57

## 2018-09-08 RX ADMIN — Medication 1 TABLET(S): at 13:17

## 2018-09-08 RX ADMIN — SACUBITRIL AND VALSARTAN 1 TABLET(S): 24; 26 TABLET, FILM COATED ORAL at 05:58

## 2018-09-08 RX ADMIN — CHLORHEXIDINE GLUCONATE 1 APPLICATION(S): 213 SOLUTION TOPICAL at 05:59

## 2018-09-08 RX ADMIN — Medication 1 TABLET(S): at 05:57

## 2018-09-08 RX ADMIN — Medication 100 MILLIGRAM(S): at 05:57

## 2018-09-08 RX ADMIN — Medication 325 MILLIGRAM(S): at 13:17

## 2018-09-08 RX ADMIN — CALCITRIOL 0.25 MICROGRAM(S): 0.5 CAPSULE ORAL at 06:53

## 2018-09-08 NOTE — PROGRESS NOTE ADULT - ASSESSMENT
Assessment and Plan:   The patient is a 58 year old female with a history of ESRD on HD MWF, afib not on anticoagulation given history of GIB, chronic diastolic CHF who presented to the ER with complaints of worsening shortness of breath. CT on admission consistent with large right pleural effusion now status post pigtail placement by CTS.  Code sepsis called for fever. Started on IV zosyn, cultures ordered, ID consulted. Chest tube removed by CT surgery on 8/2. Vascular surgery consulted for permacath removal. after HD in am from graft, she is ready for discharge. SW and CM aware.    Assessment/Plan:    1. Sepsis secondary to right upper lobe pneumonia with possible parapneumonic effusion  - Blood cultures on 8/30 negative x 2  - ID on consult; continue abx for a total of 7 days of zosyn  - Status post chest tube removal on 9/2  - pleural fluid cultures and acid fast staining negative thus far.    2. ESRD on HD MWF: Renal following.  permacath was removed yesterday by vascular.  -HD through graft today    3. Acute on chronic systolic CHF with NICM: Echocardiogram with  global hypokinesia with low ef of 25%. Spoke with cardio, cath early this  year with non obstructive CAD. Added entresto; on BB.     4. AFib: Rate controlled; Unable to tolerate coumadin due to GI bleed. Was being evaluated for Watchmen procedure, however patient missed appointment in office.    5. Pericardial effusion - hx of uremic pericarditis, currently stable.  No pericardial effusion on echo; colchicine discontinued.    6. Diabetes mellitus type 2: HSS> MOnitor bsl.    7. Hypocalcemia: Improved, continue ca carb and calcitriol    VTE- Heparin Subcut    disposition: home with home care

## 2018-09-08 NOTE — PROGRESS NOTE ADULT - SUBJECTIVE AND OBJECTIVE BOX
JAROCHO MORALES    [id number]    58y      Female    CC: Pleural effusion    INTERVAL HPI/OVERNIGHT EVENTS: No complaints this morning, denies pain. Ambulating with PT. Permacath was removed yesterday by vascular. was getting HD currently and then DC home after that.      Vital Signs Last 24 Hrs  T(C): 37.1 (08 Sep 2018 04:59), Max: 37.3 (07 Sep 2018 19:44)  T(F): 98.8 (08 Sep 2018 04:59), Max: 99.2 (07 Sep 2018 19:44)  HR: 80 (08 Sep 2018 10:15) (75 - 80)  BP: 147/47 (08 Sep 2018 10:15) (127/73 - 151/92)  BP(mean): --  RR: 18 (08 Sep 2018 10:15) (18 - 19)  SpO2: 97% (08 Sep 2018 10:15) (95% - 97%)    PHYSICAL EXAM:  GENERAL: NAD, AOX3  HEAD:  Atraumatic, Normocephalic  EYES: EOMI, PERRLA, conjunctiva and sclera clear  ENMT: Moist mucous membranes  CHEST/LUNG: Decreased crackles right and left lung base  HEART: Regular rate and rhythm; No murmurs, rubs, or gallops   ABDOMEN: Soft, Nontender, Nondistended; Bowel sounds present  EXTREMITIES:  2+ Peripheral Pulses, No clubbing, cyanosis, or edema        LABS:                        9.8    5.2   )-----------( 300      ( 08 Sep 2018 11:12 )             32.3     09-08    135  |  96<L>  |  25.0<H>  ----------------------------<  164<H>  4.6   |  26.0  |  4.77<H>    Ca    8.7      08 Sep 2018 11:12  Phos  4.0     09-08              MEDICATIONS  (STANDING):  aspirin enteric coated 81 milliGRAM(s) Oral daily  calcitriol   Capsule 0.25 MICROGram(s) Oral two times a day  calcium carbonate   1250 mG (OsCal) 1 Tablet(s) Oral three times a day  carvedilol 12.5 milliGRAM(s) Oral every 12 hours  chlorhexidine 2% Cloths 1 Application(s) Topical <User Schedule>  dextrose 5%. 1000 milliLiter(s) (50 mL/Hr) IV Continuous <Continuous>  dextrose 50% Injectable 12.5 Gram(s) IV Push once  dextrose 50% Injectable 25 Gram(s) IV Push once  dextrose 50% Injectable 25 Gram(s) IV Push once  docusate sodium 100 milliGRAM(s) Oral two times a day  ferrous    sulfate 325 milliGRAM(s) Oral daily  heparin  Injectable 5000 Unit(s) SubCutaneous every 12 hours  insulin lispro (HumaLOG) corrective regimen sliding scale   SubCutaneous three times a day before meals  insulin lispro (HumaLOG) corrective regimen sliding scale   SubCutaneous at bedtime  pantoprazole    Tablet 40 milliGRAM(s) Oral before breakfast  sacubitril 49 mG/valsartan 51 mG 1 Tablet(s) Oral two times a day    MEDICATIONS  (PRN):  acetaminophen   Tablet 650 milliGRAM(s) Oral every 6 hours PRN For Temp greater than 38 C (100.4 F)  acetaminophen   Tablet. 650 milliGRAM(s) Oral every 6 hours PRN Mild Pain (1 - 3)  dextrose 40% Gel 15 Gram(s) Oral once PRN Blood Glucose LESS THAN 70 milliGRAM(s)/deciliter  glucagon  Injectable 1 milliGRAM(s) IntraMuscular once PRN Glucose LESS THAN 70 milligrams/deciliter  senna 2 Tablet(s) Oral at bedtime PRN Constipation      RADIOLOGY & ADDITIONAL TESTS:

## 2018-09-08 NOTE — PROGRESS NOTE ADULT - SUBJECTIVE AND OBJECTIVE BOX
Patient was seen and evaluated on dialysis.   No c/o CP SOB NV  CTs out  no F/C  Has some swelling    T(C): 37.2 (09-08-18 @ 14:00), Max: 37.2 (09-08-18 @ 10:15)  HR: 79 (09-08-18 @ 14:00) (75 - 80)  BP: 151/81 (09-08-18 @ 14:00) (127/73 - 151/81)  Wt(kg): --  PE ;  NAD  lungs - poor BS bases; occ rhonchi  CV gr 1 murmer,  No gallop or rub  Abd : soft, NT BS +, No masses  Ext- + edema  Neuro : Grossly intact, moving extremities                             9.8    5.2   )-----------( 300      ( 08 Sep 2018 11:12 )             32.3        09-08    135  |  96<L>  |  25.0<H>  ----------------------------<  164<H>  4.6   |  26.0  |  4.77<H>    Ca    8.7      08 Sep 2018 11:12  Phos  4.0     09-08        MEDICATIONS  (STANDING):  acetaminophen   Tablet PRN  acetaminophen   Tablet. PRN  aspirin enteric coated  calcitriol   Capsule  calcium carbonate   1250 mG (OsCal)  carvedilol  chlorhexidine 2% Cloths  dextrose 40% Gel PRN  dextrose 5%.  dextrose 50% Injectable  dextrose 50% Injectable  dextrose 50% Injectable  docusate sodium  ferrous    sulfate  glucagon  Injectable PRN  heparin  Injectable  insulin lispro (HumaLOG) corrective regimen sliding scale  insulin lispro (HumaLOG) corrective regimen sliding scale  pantoprazole    Tablet  sacubitril 49 mG/valsartan 51 mG  senna PRN      Patient stable  Radha HD easily  SOB better- CT out  Continue

## 2018-09-08 NOTE — PROGRESS NOTE ADULT - NSHPATTENDINGPLANDISCUSS_GEN_ALL_CORE
Dr. King
Patient.
patient, RN, , 
patient, rn,  and SW
patient, RN, , , infectious disease specialist

## 2018-09-08 NOTE — PROGRESS NOTE ADULT - REASON FOR ADMISSION
Shortness of breath
Shortness of breath; pneumonia

## 2018-09-19 ENCOUNTER — INPATIENT (INPATIENT)
Facility: HOSPITAL | Age: 58
LOS: 6 days | Discharge: HOME | DRG: 291 | End: 2018-09-26
Attending: INTERNAL MEDICINE | Admitting: HOSPITALIST
Payer: MEDICARE

## 2018-09-19 VITALS
WEIGHT: 160.06 LBS | DIASTOLIC BLOOD PRESSURE: 86 MMHG | TEMPERATURE: 98 F | HEART RATE: 78 BPM | OXYGEN SATURATION: 95 % | HEIGHT: 65 IN | SYSTOLIC BLOOD PRESSURE: 165 MMHG | RESPIRATION RATE: 20 BRPM

## 2018-09-19 DIAGNOSIS — I10 ESSENTIAL (PRIMARY) HYPERTENSION: ICD-10-CM

## 2018-09-19 DIAGNOSIS — I42.8 OTHER CARDIOMYOPATHIES: ICD-10-CM

## 2018-09-19 DIAGNOSIS — Z49.01 ENCOUNTER FOR FITTING AND ADJUSTMENT OF EXTRACORPOREAL DIALYSIS CATHETER: Chronic | ICD-10-CM

## 2018-09-19 DIAGNOSIS — J90 PLEURAL EFFUSION, NOT ELSEWHERE CLASSIFIED: ICD-10-CM

## 2018-09-19 DIAGNOSIS — N18.6 END STAGE RENAL DISEASE: ICD-10-CM

## 2018-09-19 DIAGNOSIS — I48.0 PAROXYSMAL ATRIAL FIBRILLATION: ICD-10-CM

## 2018-09-19 DIAGNOSIS — Z29.9 ENCOUNTER FOR PROPHYLACTIC MEASURES, UNSPECIFIED: ICD-10-CM

## 2018-09-19 DIAGNOSIS — I77.0 ARTERIOVENOUS FISTULA, ACQUIRED: Chronic | ICD-10-CM

## 2018-09-19 DIAGNOSIS — E11.65 TYPE 2 DIABETES MELLITUS WITH HYPERGLYCEMIA: ICD-10-CM

## 2018-09-19 LAB
ALBUMIN SERPL ELPH-MCNC: 3 G/DL — LOW (ref 3.3–5.2)
ALP SERPL-CCNC: 108 U/L — SIGNIFICANT CHANGE UP (ref 40–120)
ALT FLD-CCNC: 10 U/L — SIGNIFICANT CHANGE UP
ANION GAP SERPL CALC-SCNC: 14 MMOL/L — SIGNIFICANT CHANGE UP (ref 5–17)
APTT BLD: 25.5 SEC — LOW (ref 27.5–37.4)
AST SERPL-CCNC: 44 U/L — HIGH
BILIRUB SERPL-MCNC: 0.6 MG/DL — SIGNIFICANT CHANGE UP (ref 0.4–2)
BUN SERPL-MCNC: 45 MG/DL — HIGH (ref 8–20)
CALCIUM SERPL-MCNC: 9 MG/DL — SIGNIFICANT CHANGE UP (ref 8.6–10.2)
CHLORIDE SERPL-SCNC: 91 MMOL/L — LOW (ref 98–107)
CO2 SERPL-SCNC: 27 MMOL/L — SIGNIFICANT CHANGE UP (ref 22–29)
CREAT SERPL-MCNC: 4.75 MG/DL — HIGH (ref 0.5–1.3)
GLUCOSE SERPL-MCNC: 251 MG/DL — HIGH (ref 70–115)
HCG SERPL-ACNC: 5.72 MIU/ML — SIGNIFICANT CHANGE UP
INR BLD: 1.16 RATIO — SIGNIFICANT CHANGE UP (ref 0.88–1.16)
MAGNESIUM SERPL-MCNC: 2.2 MG/DL — SIGNIFICANT CHANGE UP (ref 1.8–2.6)
NT-PROBNP SERPL-SCNC: HIGH PG/ML (ref 0–300)
PHOSPHATE SERPL-MCNC: 3.1 MG/DL — SIGNIFICANT CHANGE UP (ref 2.4–4.7)
POTASSIUM SERPL-MCNC: 6.1 MMOL/L — CRITICAL HIGH (ref 3.5–5.3)
POTASSIUM SERPL-SCNC: 6.1 MMOL/L — CRITICAL HIGH (ref 3.5–5.3)
PROT SERPL-MCNC: 7.3 G/DL — SIGNIFICANT CHANGE UP (ref 6.6–8.7)
PROTHROM AB SERPL-ACNC: 12.8 SEC — HIGH (ref 9.8–12.7)
SODIUM SERPL-SCNC: 132 MMOL/L — LOW (ref 135–145)

## 2018-09-19 PROCEDURE — 99497 ADVNCD CARE PLAN 30 MIN: CPT | Mod: 25

## 2018-09-19 PROCEDURE — 99285 EMERGENCY DEPT VISIT HI MDM: CPT

## 2018-09-19 PROCEDURE — 71045 X-RAY EXAM CHEST 1 VIEW: CPT | Mod: 26

## 2018-09-19 PROCEDURE — 99223 1ST HOSP IP/OBS HIGH 75: CPT | Mod: 25

## 2018-09-19 PROCEDURE — 93010 ELECTROCARDIOGRAM REPORT: CPT

## 2018-09-19 RX ORDER — SENNA PLUS 8.6 MG/1
2 TABLET ORAL AT BEDTIME
Qty: 0 | Refills: 0 | Status: DISCONTINUED | OUTPATIENT
Start: 2018-09-19 | End: 2018-09-26

## 2018-09-19 RX ORDER — INSULIN GLARGINE 100 [IU]/ML
16 INJECTION, SOLUTION SUBCUTANEOUS AT BEDTIME
Qty: 0 | Refills: 0 | Status: DISCONTINUED | OUTPATIENT
Start: 2018-09-19 | End: 2018-09-21

## 2018-09-19 RX ORDER — CARVEDILOL PHOSPHATE 80 MG/1
12.5 CAPSULE, EXTENDED RELEASE ORAL EVERY 12 HOURS
Qty: 0 | Refills: 0 | Status: DISCONTINUED | OUTPATIENT
Start: 2018-09-19 | End: 2018-09-26

## 2018-09-19 RX ORDER — ASPIRIN/CALCIUM CARB/MAGNESIUM 324 MG
81 TABLET ORAL DAILY
Qty: 0 | Refills: 0 | Status: DISCONTINUED | OUTPATIENT
Start: 2018-09-19 | End: 2018-09-26

## 2018-09-19 RX ORDER — GLUCAGON INJECTION, SOLUTION 0.5 MG/.1ML
1 INJECTION, SOLUTION SUBCUTANEOUS ONCE
Qty: 0 | Refills: 0 | Status: DISCONTINUED | OUTPATIENT
Start: 2018-09-19 | End: 2018-09-26

## 2018-09-19 RX ORDER — CALCIUM CARBONATE 500(1250)
1 TABLET ORAL THREE TIMES A DAY
Qty: 0 | Refills: 0 | Status: DISCONTINUED | OUTPATIENT
Start: 2018-09-19 | End: 2018-09-26

## 2018-09-19 RX ORDER — DEXTROSE 50 % IN WATER 50 %
15 SYRINGE (ML) INTRAVENOUS ONCE
Qty: 0 | Refills: 0 | Status: DISCONTINUED | OUTPATIENT
Start: 2018-09-19 | End: 2018-09-26

## 2018-09-19 RX ORDER — HEPARIN SODIUM 5000 [USP'U]/ML
5000 INJECTION INTRAVENOUS; SUBCUTANEOUS EVERY 8 HOURS
Qty: 0 | Refills: 0 | Status: DISCONTINUED | OUTPATIENT
Start: 2018-09-19 | End: 2018-09-26

## 2018-09-19 RX ORDER — ACETAMINOPHEN 500 MG
650 TABLET ORAL EVERY 6 HOURS
Qty: 0 | Refills: 0 | Status: DISCONTINUED | OUTPATIENT
Start: 2018-09-19 | End: 2018-09-26

## 2018-09-19 RX ORDER — PIPERACILLIN AND TAZOBACTAM 4; .5 G/20ML; G/20ML
3.38 INJECTION, POWDER, LYOPHILIZED, FOR SOLUTION INTRAVENOUS ONCE
Qty: 0 | Refills: 0 | Status: COMPLETED | OUTPATIENT
Start: 2018-09-19 | End: 2018-09-19

## 2018-09-19 RX ORDER — SODIUM CHLORIDE 9 MG/ML
1000 INJECTION, SOLUTION INTRAVENOUS
Qty: 0 | Refills: 0 | Status: DISCONTINUED | OUTPATIENT
Start: 2018-09-19 | End: 2018-09-23

## 2018-09-19 RX ORDER — SACUBITRIL AND VALSARTAN 24; 26 MG/1; MG/1
1 TABLET, FILM COATED ORAL
Qty: 0 | Refills: 0 | Status: DISCONTINUED | OUTPATIENT
Start: 2018-09-19 | End: 2018-09-21

## 2018-09-19 RX ORDER — FUROSEMIDE 40 MG
40 TABLET ORAL ONCE
Qty: 0 | Refills: 0 | Status: COMPLETED | OUTPATIENT
Start: 2018-09-19 | End: 2018-09-19

## 2018-09-19 RX ORDER — ERYTHROPOIETIN 10000 [IU]/ML
10000 INJECTION, SOLUTION INTRAVENOUS; SUBCUTANEOUS
Qty: 0 | Refills: 0 | Status: DISCONTINUED | OUTPATIENT
Start: 2018-09-19 | End: 2018-09-24

## 2018-09-19 RX ORDER — CALCITRIOL 0.5 UG/1
0.25 CAPSULE ORAL DAILY
Qty: 0 | Refills: 0 | Status: DISCONTINUED | OUTPATIENT
Start: 2018-09-19 | End: 2018-09-26

## 2018-09-19 RX ORDER — DEXTROSE 50 % IN WATER 50 %
12.5 SYRINGE (ML) INTRAVENOUS ONCE
Qty: 0 | Refills: 0 | Status: DISCONTINUED | OUTPATIENT
Start: 2018-09-19 | End: 2018-09-26

## 2018-09-19 RX ORDER — DOCUSATE SODIUM 100 MG
100 CAPSULE ORAL
Qty: 0 | Refills: 0 | Status: DISCONTINUED | OUTPATIENT
Start: 2018-09-19 | End: 2018-09-26

## 2018-09-19 RX ORDER — INSULIN LISPRO 100/ML
VIAL (ML) SUBCUTANEOUS
Qty: 0 | Refills: 0 | Status: DISCONTINUED | OUTPATIENT
Start: 2018-09-19 | End: 2018-09-26

## 2018-09-19 RX ORDER — PANTOPRAZOLE SODIUM 20 MG/1
40 TABLET, DELAYED RELEASE ORAL
Qty: 0 | Refills: 0 | Status: DISCONTINUED | OUTPATIENT
Start: 2018-09-19 | End: 2018-09-26

## 2018-09-19 RX ADMIN — Medication 40 MILLIGRAM(S): at 18:38

## 2018-09-19 RX ADMIN — INSULIN GLARGINE 16 UNIT(S): 100 INJECTION, SOLUTION SUBCUTANEOUS at 23:55

## 2018-09-19 RX ADMIN — CARVEDILOL PHOSPHATE 12.5 MILLIGRAM(S): 80 CAPSULE, EXTENDED RELEASE ORAL at 22:38

## 2018-09-19 RX ADMIN — ERYTHROPOIETIN 10000 UNIT(S): 10000 INJECTION, SOLUTION INTRAVENOUS; SUBCUTANEOUS at 20:17

## 2018-09-19 RX ADMIN — PIPERACILLIN AND TAZOBACTAM 200 GRAM(S): 4; .5 INJECTION, POWDER, LYOPHILIZED, FOR SOLUTION INTRAVENOUS at 18:38

## 2018-09-19 RX ADMIN — HEPARIN SODIUM 5000 UNIT(S): 5000 INJECTION INTRAVENOUS; SUBCUTANEOUS at 23:55

## 2018-09-19 NOTE — H&P ADULT - ASSESSMENT
Pt is a 57 yo F w/ dyspnea 2/2 volume overload, PMH recent PNA w/ parapneumonic effusion s/p Chest tube, ESRD on HD, NICM, CAD, CHF w/ EF 20-25%, DM2, parox A fib, h/o GI bleed.

## 2018-09-19 NOTE — ED CLERICAL - NS ED CLERK NOTE PRE-ARRIVAL INFORMATION; ADDITIONAL PRE-ARRIVAL INFORMATION
This patient is enrolled in the readmission program and has active care navigation. This patient can be followed up by the care navigation team within 24 hours. To arrange close follow-up or to obtain additional clinical information about this patient, please call the contact number above.

## 2018-09-19 NOTE — CONSULT NOTE ADULT - SUBJECTIVE AND OBJECTIVE BOX
HPI: 57yo F with SOB x2 days, +subjective fever/chills. SOB gradual onset, worsening, sent in by home RN. +CP with coughing. +mild productive coughing. on HD last mon is on M/W/F HD schedule. was recently in hospital recently and had 'fluid in lung' drained. +nausea no vomiting/abd pain. +urinary sx. chronic LE edema       PAST MEDICAL & SURGICAL HISTORY:  Pleural effusion  Pericardial effusion  End stage renal disease on dialysis  HLD (hyperlipidemia)  HTN (hypertension)  DM (diabetes mellitus)  A-V fistula  Encounter for dialysis catheter care   DM 2, MO, hypothyroidism, prior DVT and IVC filter; Cholecystectomy    FAMILY HISTORY:  No pertinent family history in first degree relatives  [place]    Social History:Non smoker    MEDICATIONS  (STANDING):    MEDICATIONS  (PRN):   Meds reviewed      Vital Signs Last 24 Hrs  T(C): 36.6 (19 Sep 2018 14:59), Max: 36.6 (19 Sep 2018 14:59)  T(F): 97.9 (19 Sep 2018 14:59), Max: 97.9 (19 Sep 2018 14:59)  HR: 78 (19 Sep 2018 14:59) (78 - 78)  BP: 165/86 (19 Sep 2018 14:59) (165/86 - 165/86)  BP(mean): --  RR: 20 (19 Sep 2018 14:59) (20 - 20)  SpO2: 95% (19 Sep 2018 14:59) (95% - 95%)  Daily Height in cm: 165.1 (19 Sep 2018 14:59)    Daily     PHYSICAL EXAM:    GENERAL: appears chronically ill  HEAD:  NCAT  EYES: EOMI  NECK: Supple, neck  veins full  NERVOUS SYSTEM:  Alert & Oriented X3  CHEST/LUNG: Faint rales at bases B/L  HEART: Regular rate and rhythm; No murmur  ABDOMEN: Soft, Nontender, Nondistended; Bowel sounds present  EXTREMITIES:  Trace LE edema      LABS:                        9.5    7.0   )-----------( 317      ( 19 Sep 2018 16:42 )             31.2     09-19    132<L>  |  91<L>  |  45.0<H>  ----------------------------<  251<H>  6.1<HH>   |  27.0  |  4.75<H>    Ca    9.0      19 Sep 2018 18:35  Phos  3.1     09-19  Mg     2.2     09-19    TPro  7.3  /  Alb  3.0<L>  /  TBili  0.6  /  DBili  x   /  AST  44<H>  /  ALT  10  /  AlkPhos  108  09-19    PT/INR - ( 19 Sep 2018 16:12 )   PT: 12.8 sec;   INR: 1.16 ratio         PTT - ( 19 Sep 2018 16:12 )  PTT:25.5 sec    Magnesium, Serum: 2.2 mg/dL (09-19 @ 18:35)  Phosphorus Level, Serum: 3.1 mg/dL (09-19 @ 18:35)          RADIOLOGY & ADDITIONAL TESTS:

## 2018-09-19 NOTE — H&P ADULT - PROBLEM SELECTOR PLAN 1
Patient w/ hyperkalemia and volume overload.   -currently undergoing emergent HD. Patient feeling symptomatically better, SOB resolved at time of my examination

## 2018-09-19 NOTE — ED PROVIDER NOTE - PROGRESS NOTE DETAILS
bedside sono with B/L large pleural effusions- likely etiology of patient symptoms, no acute distress, will drain if needed. will need admission likely for b/l pigtails. -Farrukh DO severe hemolysis, K elevated no EKG changes. patient will go to HD now -Farrukh DO

## 2018-09-19 NOTE — CONSULT NOTE ADULT - ASSESSMENT
ESRD on HD MWF schedule  presented w SOB clinically hypervolemic  will HD tonight attempt to remove additional UF  Anemia will send iron studies  - will give epo w HD  Consent for HD obtained by me  HTN would cont home meds UF removal w HD will help    Will follow

## 2018-09-19 NOTE — H&P ADULT - HISTORY OF PRESENT ILLNESS
Pt is a 59 yo F w/ SOB likely 2/2 to volume overload, PMH recent PNA w/ parapneumonic effusion s/p Chest tube, ESRD on HD, NICM, CAD, CHF w/ EF 20-25%, DM2, parox A fib, h/o GI bleed.   Patient presents after recommended to come to hospital by her home nurse due to SOB. Patient has been having SOB beginning today  and continunous non-productive cough since her previous admission, she has not felt fevers, but has felt chills, she completed antibiotics, she does have chest pain only when she coughs and it is not associated w/ exertion.   She does have a mild headache, no change in vision or hearing. Denies abd pain, diarrhea, constipation, melena, hematochezia, dysuria. She produces small amounts of urine daily.     While in ED patient received a dose of Zosyn, was evaluated by nephrology who recommended emergent dialysis. Patient was evaluated by me in Dialysis unit. K 6.1 on labs. Na 132, BNP 70k.

## 2018-09-19 NOTE — H&P ADULT - ATTENDING COMMENTS
Patient is full code. I had a goals of care discussion with the patient and discussed advance directive and importance of appointing a health care agent, I discussed that her kidney disease is end stage and that she may become volume overloaded recurrently and ultimately she requires life long dialysis (which she was already aware of), and that given her other comorbidities and EF 20-25% that she can suffer complications from her medical condition that can ultimately lead to arrythmia, volume overload, pulmonary edema and even death. In event of an emergency she accepts CPR and Intubation. 17 minutes was spent with the patient discussing advanced care planning separate for management of patients other medical problems.   Discussed with Nurse Brown to draw patients troponin.  Patient was evaluated at approximately 9PM and again at 11PM approx

## 2018-09-19 NOTE — ED ADULT NURSE NOTE - CHIEF COMPLAINT QUOTE
Patient arrived via EMS, awake. alert, and oriented times 3, breathing labored.  Patient states " unable to catch breath"  Patient due for dialysis today.  Patient states SOB occurs on exertion and started this at 1200noon today.  Patient also states dry cough for 2 weeks  Fistula noted to left upper arm.  Oxygen applied via NC 2LPM for patient comfort and intermittent SOB

## 2018-09-19 NOTE — H&P ADULT - FAMILY HISTORY
Sibling  Still living? Unknown  Family history of kidney disease in brother, Age at diagnosis: Age Unknown

## 2018-09-19 NOTE — H&P ADULT - NSHPPHYSICALEXAM_GEN_ALL_CORE
T(C): 36.4 (09-19-18 @ 19:30), Max: 36.6 (09-19-18 @ 14:59)  HR: 72 (09-19-18 @ 19:30) (72 - 78)  BP: 158/86 (09-19-18 @ 19:30) (158/86 - 165/86)  RR: 19 (09-19-18 @ 19:30) (19 - 20)  SpO2: 93% (09-19-18 @ 19:30) (93% - 95%)  Wt(kg): --    Physical Exam:   GENERAL: well-groomed, well-developed, NAD  HEENT: head NC/AT; EOM intact, PERRLA, conjunctiva & sclera clear; hearing grossly intact, moist mucous membranes  NECK: supple, no JVD  RESPIRATORY: decreased Breath sounds b/l lung bases, no wheezes  CARDIOVASCULAR: S1&S2, RRR, no murmurs or gallops  ABDOMEN: soft, non-tender, non-distended, + Bowel sounds x4 quadrants, no guarding, rebound or rigidity  MUSCULOSKELETAL:  no clubbing, cyanosis of lower ext, 2-3+ LE pitting edema b/l  LYMPH: no cervical lymphadenopathy  VASCULAR: Radial pulses 2+ bilaterally, no varicose veins   SKIN: warm and dry, color normal  NEUROLOGIC: AA&O X3, CN2-12 intact w/ no focal deficits, no sensory loss, motor Strength 5/5 in UE & LE B/L  Psych: Normal mood and affect, normal behavior

## 2018-09-19 NOTE — ED PROVIDER NOTE - NS ED ROS FT
ROS: +CP/SOB. +cough. +fever. no v/d/c. no abd pain. no rash. no bleeding. no urinary complaints. no weakness. no vision changes. no HA. no neck/back pain. no extremity swelling/deformity. No change in mental status.

## 2018-09-19 NOTE — H&P ADULT - REASON FOR ADMISSION
Care After Your General Surgery  Dr. Samano  [phone]  Activity  • For the next 24 hours: Do not stay alone, drive a car, use electrical/power tools or appliances, drink alcohol, or sign any legal papers  • You may do your normal activity as tolerated (unless otherwise instructed).    • Be sure to ambulate at least 4 times per day when you are home.    • No driving while taking prescription pain medication.    Care of your dressing/incision  • Keep your dressing clean and dry.     Bathing  You may shower after the dressing is removed.  Do not soak your incisions in the tub.    Special Instructions   • Bruising and numbness around the incision are normal and will gradually decrease.  • Firmness under the wound is normal after 1 week and may last up to 4-6 weeks until the scar softens.  If there is excessive swelling or firmness please contact your surgeon.  • If you have bleeding from your incision, apply pressure with your hand for 10-15 minutes.  If bleeding continues call your surgeon.    Diet  • Start with a light meal.  •   If you do well with that then start your regular diet.       • May change dressing in 2 days. Keep ace bandage over this area. May shower and get wet in 2 days but do not scrub the wound. Follow-up in my office in 10 days    Call your doctor if you have:  • Temperature over 101 degrees.  • Increasing pain, redness, swelling at the site of your surgery.  • Pus-like drainage from the incision.  • Pain uncontrolled by your pain medication.  • Calf pain (this may indicate a blood clot).     Volume Overload

## 2018-09-19 NOTE — H&P ADULT - PROBLEM SELECTOR PLAN 3
Recent Echo w/ EF 20-25%, Grade 2 Diastolic dysfxn.   -monitor volume status. Fluid restricted renal diet  -Continue Entresto.   -Trend troponin in setting of patients dyspnea and cardiac history

## 2018-09-19 NOTE — H&P ADULT - PROBLEM SELECTOR PLAN 2
-Patient w/ b/l Plueral effusions on CXR (official CXR read pending).   -no signs of infectious etiology. S/p 1 dose Zosyn in ED. Patient afebrile, normal WBC count, no evidence to suggest infectious etiology at this time. Will hold off on antibiotics at this time.   -continue to monitor clinically. Consider Chest CT if clinical deterioration.

## 2018-09-19 NOTE — ED ADULT TRIAGE NOTE - CHIEF COMPLAINT QUOTE
Patient arrived via EMS, awake. alert, and oriented times 3, breathing labored.  Patient states " unable to catch breath"  Patient due for dialysis today.  Patient states SOB occurs on exertion and started this at 1200noon today.  Patient also states dry cough for 2 weeks  Fistula noted to left upper arm Patient arrived via EMS, awake. alert, and oriented times 3, breathing labored.  Patient states " unable to catch breath"  Patient due for dialysis today.  Patient states SOB occurs on exertion and started this at 1200noon today.  Patient also states dry cough for 2 weeks  Fistula noted to left upper arm.  Oxygen applied via NC 2LPM for patient comfort and intermittent SOB

## 2018-09-19 NOTE — H&P ADULT - PROBLEM SELECTOR PLAN 6
-Continue Coreg.   -Patient not on AC due to bleeding risk given GI bleed in past.   -Watchmen procedure to be scheduled as outpatient.

## 2018-09-19 NOTE — ED PROVIDER NOTE - MEDICAL DECISION MAKING DETAILS
patient with worsening SOB, subjective fever/chills. and cough. recent admission with pleural effusion. concern for recurrent/new PNA. recurrent effusion. or pericardial effusion. will get CXR, full labs, EKG, 2L NC and plan to admit

## 2018-09-19 NOTE — ED PROVIDER NOTE - OBJECTIVE STATEMENT
57yo F with SOB x2 days, +subjective fever/chills. SOB gradual onset, worsening, sent in by home RN. +CP with coughing. +mild productive coughing. on HD last monday. in hospital recently and had 'fluid in lung' drained. +nausea no vomiting/abd pain. +urinary sx. chronic LE edema not worsening

## 2018-09-20 LAB
ANION GAP SERPL CALC-SCNC: 13 MMOL/L — SIGNIFICANT CHANGE UP (ref 5–17)
BASOPHILS # BLD AUTO: 0 K/UL — SIGNIFICANT CHANGE UP (ref 0–0.2)
BASOPHILS NFR BLD AUTO: 0.1 % — SIGNIFICANT CHANGE UP (ref 0–2)
BUN SERPL-MCNC: 23 MG/DL — HIGH (ref 8–20)
CALCIUM SERPL-MCNC: 8.6 MG/DL — SIGNIFICANT CHANGE UP (ref 8.6–10.2)
CHLORIDE SERPL-SCNC: 91 MMOL/L — LOW (ref 98–107)
CO2 SERPL-SCNC: 28 MMOL/L — SIGNIFICANT CHANGE UP (ref 22–29)
CREAT SERPL-MCNC: 3.25 MG/DL — HIGH (ref 0.5–1.3)
EOSINOPHIL # BLD AUTO: 0.2 K/UL — SIGNIFICANT CHANGE UP (ref 0–0.5)
EOSINOPHIL NFR BLD AUTO: 2.3 % — SIGNIFICANT CHANGE UP (ref 0–6)
GLUCOSE BLDC GLUCOMTR-MCNC: 142 MG/DL — HIGH (ref 70–99)
GLUCOSE BLDC GLUCOMTR-MCNC: 172 MG/DL — HIGH (ref 70–99)
GLUCOSE BLDC GLUCOMTR-MCNC: 175 MG/DL — HIGH (ref 70–99)
GLUCOSE BLDC GLUCOMTR-MCNC: 208 MG/DL — HIGH (ref 70–99)
GLUCOSE SERPL-MCNC: 212 MG/DL — HIGH (ref 70–115)
HCT VFR BLD CALC: 34 % — LOW (ref 37–47)
HGB BLD-MCNC: 10 G/DL — LOW (ref 12–16)
LYMPHOCYTES # BLD AUTO: 0.6 K/UL — LOW (ref 1–4.8)
LYMPHOCYTES # BLD AUTO: 8.1 % — LOW (ref 20–55)
MCHC RBC-ENTMCNC: 27.5 PG — SIGNIFICANT CHANGE UP (ref 27–31)
MCHC RBC-ENTMCNC: 29.4 G/DL — LOW (ref 32–36)
MCV RBC AUTO: 93.7 FL — SIGNIFICANT CHANGE UP (ref 81–99)
MONOCYTES # BLD AUTO: 0.5 K/UL — SIGNIFICANT CHANGE UP (ref 0–0.8)
MONOCYTES NFR BLD AUTO: 7.3 % — SIGNIFICANT CHANGE UP (ref 3–10)
NEUTROPHILS # BLD AUTO: 6 K/UL — SIGNIFICANT CHANGE UP (ref 1.8–8)
NEUTROPHILS NFR BLD AUTO: 81.8 % — HIGH (ref 37–73)
PLATELET # BLD AUTO: 315 K/UL — SIGNIFICANT CHANGE UP (ref 150–400)
POTASSIUM SERPL-MCNC: 3.8 MMOL/L — SIGNIFICANT CHANGE UP (ref 3.5–5.3)
POTASSIUM SERPL-SCNC: 3.8 MMOL/L — SIGNIFICANT CHANGE UP (ref 3.5–5.3)
RBC # BLD: 3.63 M/UL — LOW (ref 4.4–5.2)
RBC # FLD: 16.1 % — HIGH (ref 11–15.6)
SODIUM SERPL-SCNC: 132 MMOL/L — LOW (ref 135–145)
TROPONIN T SERPL-MCNC: 0.53 NG/ML — HIGH (ref 0–0.06)
TROPONIN T SERPL-MCNC: 0.59 NG/ML — HIGH (ref 0–0.06)
WBC # BLD: 7.3 K/UL — SIGNIFICANT CHANGE UP (ref 4.8–10.8)
WBC # FLD AUTO: 7.3 K/UL — SIGNIFICANT CHANGE UP (ref 4.8–10.8)

## 2018-09-20 PROCEDURE — 99223 1ST HOSP IP/OBS HIGH 75: CPT

## 2018-09-20 PROCEDURE — 93010 ELECTROCARDIOGRAM REPORT: CPT

## 2018-09-20 PROCEDURE — 12345: CPT | Mod: NC

## 2018-09-20 PROCEDURE — 99233 SBSQ HOSP IP/OBS HIGH 50: CPT

## 2018-09-20 RX ORDER — INFLUENZA VIRUS VACCINE 15; 15; 15; 15 UG/.5ML; UG/.5ML; UG/.5ML; UG/.5ML
0.5 SUSPENSION INTRAMUSCULAR ONCE
Qty: 0 | Refills: 0 | Status: DISCONTINUED | OUTPATIENT
Start: 2018-09-20 | End: 2018-09-26

## 2018-09-20 RX ORDER — CHLORHEXIDINE GLUCONATE 213 G/1000ML
1 SOLUTION TOPICAL
Qty: 0 | Refills: 0 | Status: DISCONTINUED | OUTPATIENT
Start: 2018-09-20 | End: 2018-09-26

## 2018-09-20 RX ORDER — IPRATROPIUM/ALBUTEROL SULFATE 18-103MCG
3 AEROSOL WITH ADAPTER (GRAM) INHALATION EVERY 6 HOURS
Qty: 0 | Refills: 0 | Status: DISCONTINUED | OUTPATIENT
Start: 2018-09-20 | End: 2018-09-26

## 2018-09-20 RX ORDER — FUROSEMIDE 40 MG
40 TABLET ORAL ONCE
Qty: 0 | Refills: 0 | Status: COMPLETED | OUTPATIENT
Start: 2018-09-20 | End: 2018-09-20

## 2018-09-20 RX ADMIN — Medication 1: at 11:37

## 2018-09-20 RX ADMIN — Medication 1 TABLET(S): at 00:40

## 2018-09-20 RX ADMIN — SACUBITRIL AND VALSARTAN 1 TABLET(S): 24; 26 TABLET, FILM COATED ORAL at 22:45

## 2018-09-20 RX ADMIN — CARVEDILOL PHOSPHATE 12.5 MILLIGRAM(S): 80 CAPSULE, EXTENDED RELEASE ORAL at 05:08

## 2018-09-20 RX ADMIN — Medication 1 TABLET(S): at 22:48

## 2018-09-20 RX ADMIN — INSULIN GLARGINE 16 UNIT(S): 100 INJECTION, SOLUTION SUBCUTANEOUS at 22:44

## 2018-09-20 RX ADMIN — HEPARIN SODIUM 5000 UNIT(S): 5000 INJECTION INTRAVENOUS; SUBCUTANEOUS at 05:08

## 2018-09-20 RX ADMIN — HEPARIN SODIUM 5000 UNIT(S): 5000 INJECTION INTRAVENOUS; SUBCUTANEOUS at 13:24

## 2018-09-20 RX ADMIN — Medication 1 TABLET(S): at 11:37

## 2018-09-20 RX ADMIN — CARVEDILOL PHOSPHATE 12.5 MILLIGRAM(S): 80 CAPSULE, EXTENDED RELEASE ORAL at 19:55

## 2018-09-20 RX ADMIN — HEPARIN SODIUM 5000 UNIT(S): 5000 INJECTION INTRAVENOUS; SUBCUTANEOUS at 22:44

## 2018-09-20 RX ADMIN — CALCITRIOL 0.25 MICROGRAM(S): 0.5 CAPSULE ORAL at 11:37

## 2018-09-20 RX ADMIN — PANTOPRAZOLE SODIUM 40 MILLIGRAM(S): 20 TABLET, DELAYED RELEASE ORAL at 05:08

## 2018-09-20 RX ADMIN — Medication 81 MILLIGRAM(S): at 11:37

## 2018-09-20 RX ADMIN — Medication 1: at 16:36

## 2018-09-20 RX ADMIN — Medication 1 TABLET(S): at 05:09

## 2018-09-20 RX ADMIN — Medication 2: at 08:10

## 2018-09-20 RX ADMIN — Medication 40 MILLIGRAM(S): at 02:31

## 2018-09-20 RX ADMIN — SACUBITRIL AND VALSARTAN 1 TABLET(S): 24; 26 TABLET, FILM COATED ORAL at 05:08

## 2018-09-20 NOTE — PROGRESS NOTE ADULT - ASSESSMENT
Pt is a 57 yo F w/ volume overload 2/2 HFrEF, PMH recent PNA w/ parapneumonic effusion s/p Chest tube, ESRD on HD, NICM, CAD, CHF w/ EF 20-25%, DM2, parox A fib, h/o GI bleed.     Plan:   Chest Pain: likely in setting of volume overload, troponin mildly elevated though in setting of ESRD. EKG performed at 2:21 AM shows NSR without acute ST-T wave changes.     -Continue to trend.       -Will consult Cardiology.      -Place on continuous pulse ox and telemetry.      -Plan of Care discussed with EARNEST Carter    HFrEF: Will give additional IV Lasix 40mg x1.   HTN: BP elevated. Will give lasix as stated.        -Continue Coreg.    ESRD on HD: s/p HD.   -Hyperkalemia: s/p HD . F/u AM keith

## 2018-09-20 NOTE — CONSULT NOTE ADULT - SUBJECTIVE AND OBJECTIVE BOX
CARDIOLOGY CONSULTATION NOTE   Consult requested by:      Reason for Consultation:     History obtained by: Patient, family and medical record     obtained: No    Chief complaint:    Patient is a 58y old  Female who presents with a chief complaint of Volume Overload (20 Sep 2018 02:51)      HPI:  Pt is a 57 yo F w/ SOB likely 2/2 to volume overload, PMH recent PNA w/ parapneumonic effusion s/p Chest tube, ESRD on HD, NICM, CAD, CHF w/ EF 20-25%, DM2, parox A fib, h/o GI bleed.   Patient presents after recommended to come to hospital by her home nurse due to SOB. Patient has been having SOB beginning today  and continunous non-productive cough since her previous admission, she has not felt fevers, but has felt chills, she completed antibiotics, she does have chest pain only when she coughs and it is not associated w/ exertion.   She does have a mild headache, no change in vision or hearing. Denies abd pain, diarrhea, constipation, melena, hematochezia, dysuria. She produces small amounts of urine daily.     While in ED patient received a dose of Zosyn, was evaluated by nephrology who recommended emergent dialysis. Patient was evaluated by me in Dialysis unit. K 6.1 on labs. Na 132, BNP 70k. (19 Sep 2018 21:23)        REVIEW OF SYMPTOMS:   Constitutional: Denied: fever, chills, weight loss or gain  Eyes: Denied: reddened ayes, eye discharge, eye pain  ENMT: Denied: ear pain, nasal discharge, mouth pain, throat pain or swelling  Cardiovascular: Denied: chest pain,  Chest pressure, palpitation, arrhythmia, irregular or fast heart beats, edema  Respiratory: Denied: cough, phlegm production, wheezes, dyspnea, orthopnea, PND,   GI: Denied: Abdominal pain, nausea, vomiting, diarrhea, constipation, melena, rectal bleed  : Denied: hematuria, frequency  Musculoskeletal: Denied: Muscle aches, weakness, pain  Hematology/lymp: Denied: Bleeding disorder, anemia, blood clotting  Neuro: Denied: Headache, light headedness, dizziness, numbness, aphasia, dysarthria, seizure,  syncope, near syncope  Psych: Denied: depression, anxiety  Integumentary/skin: Denied: rash, bruises, ecchymosis, itching  Allergy/Immunology: denied environmental or drug allergies    ALL OTHER REVIEW OF SYSTEMS ARE NEGATIVE.    MEDICATIONS  (STANDING):  aspirin enteric coated 81 milliGRAM(s) Oral daily  calcitriol   Capsule 0.25 MICROGram(s) Oral daily  calcium carbonate   1250 mG (OsCal) 1 Tablet(s) Oral three times a day  carvedilol 12.5 milliGRAM(s) Oral every 12 hours  dextrose 5%. 1000 milliLiter(s) (50 mL/Hr) IV Continuous <Continuous>  dextrose 50% Injectable 12.5 Gram(s) IV Push once  epoetin gian Injectable 42748 Unit(s) IV Push <User Schedule>  heparin  Injectable 5000 Unit(s) SubCutaneous every 8 hours  insulin glargine Injectable (LANTUS) 16 Unit(s) SubCutaneous at bedtime  insulin lispro (HumaLOG) corrective regimen sliding scale   SubCutaneous three times a day before meals  pantoprazole    Tablet 40 milliGRAM(s) Oral before breakfast  sacubitril 49 mG/valsartan 51 mG 1 Tablet(s) Oral two times a day    MEDICATIONS  (PRN):  acetaminophen   Tablet .. 650 milliGRAM(s) Oral every 6 hours PRN Temp greater or equal to 38C (100.4F), Moderate Pain (4 - 6)  dextrose 40% Gel 15 Gram(s) Oral once PRN Blood Glucose LESS THAN 70 milliGRAM(s)/deciliter  docusate sodium 100 milliGRAM(s) Oral two times a day PRN Constipation  glucagon  Injectable 1 milliGRAM(s) IntraMuscular once PRN Glucose LESS THAN 70 milligrams/deciliter  senna 2 Tablet(s) Oral at bedtime PRN Constipation        PAST MEDICAL & SURGICAL HISTORY:  Pleural effusion  Pericardial effusion  End stage renal disease on dialysis  HLD (hyperlipidemia)  HTN (hypertension)  DM (diabetes mellitus)  A-V fistula  Encounter for dialysis catheter care      FAMILY HISTORY:  Family history of kidney disease in brother (Sibling)      SOCIAL HISTORY:    CIGARETTES:  No    ALCOHOL: No    DRUGS: No    Vital Signs Last 24 Hrs  T(C): 36.5 (20 Sep 2018 08:08), Max: 36.6 (19 Sep 2018 14:59)  T(F): 97.7 (20 Sep 2018 08:08), Max: 97.9 (19 Sep 2018 14:59)  HR: 77 (20 Sep 2018 08:08) (72 - 78)  BP: 145/82 (20 Sep 2018 08:08) (145/82 - 167/89)  BP(mean): --  RR: 16 (20 Sep 2018 08:08) (16 - 20)  SpO2: 92% (20 Sep 2018 08:08) (92% - 96%)    PHYSICAL EXAM:      Constitutional: No fever, chills, NAD, Comfortable    Eyes: Not reddened, no discharge    ENMT: No discharge, No pain    Neck: No JVD, No Bruit    Back: No CVA tenderness    Respiratory: Clear to auscultation bilaterally    Cardiovascular: RRR, Normal S1-2, No S3-, No friction rub murmur    Gastrointestinal: Soft, NT/ND. BS+, No organomegaly    Extremities: No edema    Vascular: Distal pulses intact    Neurological: Alert, awake, oriented, able to move extremities, speach is clear    Skin: No ecchymosis, no rash    Musculoskeletal: Non tender, no weaknss    Psychiatric: Aproppriate mood, no anxiety        INTERPRETATION OF TELEMETRY:    ECG:    I&O's Detail    19 Sep 2018 07:01  -  20 Sep 2018 07:00  --------------------------------------------------------  IN:    Other: 500 mL  Total IN: 500 mL    OUT:    Other: 3000 mL  Total OUT: 3000 mL    Total NET: -2500 mL          LABS:                        10.0   7.3   )-----------( 315      ( 20 Sep 2018 06:35 )             34.0     09-20    132<L>  |  91<L>  |  23.0<H>  ----------------------------<  212<H>  3.8   |  28.0  |  3.25<H>    Ca    8.6      20 Sep 2018 06:35  Phos  3.1     09-19  Mg     2.2     09-19    TPro  7.3  /  Alb  3.0<L>  /  TBili  0.6  /  DBili  x   /  AST  44<H>  /  ALT  10  /  AlkPhos  108  09-19    CARDIAC MARKERS ( 20 Sep 2018 00:49 )  x     / 0.59 ng/mL / x     / x     / x          PT/INR - ( 19 Sep 2018 16:12 )   PT: 12.8 sec;   INR: 1.16 ratio         PTT - ( 19 Sep 2018 16:12 )  PTT:25.5 sec    I&O's Summary    19 Sep 2018 07:01  -  20 Sep 2018 07:00  --------------------------------------------------------  IN: 500 mL / OUT: 3000 mL / NET: -2500 mL        RADIOLOGY & ADDITIONAL STUDIES:  X-ray:    PREVIOUS DIAGNOSTIC TESTING:      ECHO  FINDINGS: Date:                : LVEF=          ; RV function:       ; Valvular abnormalities: Mitral valve:           ;  Aortic valve:              ;Tricuspid valve:         ; Pulmonary pressures:        Pericardium:      STRESS    FINDINGS: Date:            ;      CATHETERIZATION  FINDINGS:  Date:              :  LAD:                       ;  LCx:                        ; RCA:                ;     Assesment and Plan:  This is a 58yFemale with history of Pleural effusion, not elsewhere classified  H/o or current diagnosis of HF- Contraindication to ACEI/ARBs  H/o or current diagnosis of HF- ACEI/ARB contraindication unknown  H/o or current diagnosis of HF- Contraindication to ACEI/ARBs  H/o or current diagnosis of HF- ACEI/ARB contraindication unknown  Family history of kidney disease in brother (Sibling)  No pertinent family history in first degree relatives  MEWS Score  Pleural effusion  Pericardial effusion  End stage renal disease on dialysis  HLD (hyperlipidemia)  HTN (hypertension)  DM (diabetes mellitus)  HLD (hyperlipidemia)  HTN (hypertension)  Pleural effusion  Prophylactic measure  NICM (nonischemic cardiomyopathy)  Paroxysmal A-fib  Type 2 diabetes mellitus with hyperglycemia, with long-term current use of insulin  Essential hypertension  Pleural effusion  End stage renal disease on dialysis  A-V fistula  Encounter for dialysis catheter care  No significant past surgical history  SOB  Anemia, unspecified type  Chronic combined systolic and diastolic congestive heart failure  10  Shortness of breath   presents with Patient is a 58y old  Female who presents with a chief complaint of Volume Overload (20 Sep 2018 02:51)    1) CARDIOLOGY CONSULTATION NOTE   Consult requested by:      Reason for Consultation:     History obtained by: Patient, family and medical record     obtained: No    Chief complaint:    Patient is a 58y old  Female who presents with a chief complaint of Volume Overload (20 Sep 2018 02:51)      HPI:    58F hx DVT, s/p IVC filter, NICM (Cath 3/2018),  HFrEF with worsened EF Echo 8/31/2018: EF 20-25. DD 2. Mod MR. (Echo 5/2018: EF 45-50. DD 1.Trivial Peric Eff. LHC 3/2018: Ventriculography EF 55.Mild MLI in coronaries), h/o peric eff, s/p recent Pigtail catheter for R pleural effusion, Afib not on AC due to GI bleed,  ESRD MWF HD, anemia who presents to ED after recommended to come to hospital by her home nurse due to SOB. Admits non-productive cough, orthopnea, HA, CP. Denied fever, chills, palpitation, vomiting hematuria, syncope.     REVIEW OF SYMPTOMS:   Constitutional: Denied: fever, chills, weight loss or gain  Eyes: Denied: reddened ayes, eye discharge, eye pain  ENMT: Denied: ear pain, nasal discharge, mouth pain, throat pain or swelling  Cardiovascular: Denied: palpitation, arrhythmia, irregular or fast heart beats,   Respiratory: Denied: cough, phlegm production, wheezes, dyspnea, orthopnea, PND,   GI: Denied: Abdominal pain, nausea, vomiting, diarrhea, constipation, melena, rectal bleed  : Denied: hematuria, frequency  Musculoskeletal: Denied: Muscle aches, weakness, pain  Hematology/lymp: Denied: Bleeding disorder, anemia, blood clotting  Neuro: Denied: light headedness, dizziness, numbness, aphasia, dysarthria, seizure,  syncope, near syncope  Psych: Denied: depression, anxiety  Integumentary/skin: Denied: rash, bruises, ecchymosis, itching  Allergy/Immunology: denied environmental or drug allergies    ALL OTHER REVIEW OF SYSTEMS ARE NEGATIVE.    MEDICATIONS  (STANDING):  aspirin enteric coated 81 milliGRAM(s) Oral daily  calcitriol   Capsule 0.25 MICROGram(s) Oral daily  calcium carbonate   1250 mG (OsCal) 1 Tablet(s) Oral three times a day  carvedilol 12.5 milliGRAM(s) Oral every 12 hours  dextrose 5%. 1000 milliLiter(s) (50 mL/Hr) IV Continuous <Continuous>  dextrose 50% Injectable 12.5 Gram(s) IV Push once  epoetin gian Injectable 76258 Unit(s) IV Push <User Schedule>  heparin  Injectable 5000 Unit(s) SubCutaneous every 8 hours  insulin glargine Injectable (LANTUS) 16 Unit(s) SubCutaneous at bedtime  insulin lispro (HumaLOG) corrective regimen sliding scale   SubCutaneous three times a day before meals  pantoprazole    Tablet 40 milliGRAM(s) Oral before breakfast  sacubitril 49 mG/valsartan 51 mG 1 Tablet(s) Oral two times a day    MEDICATIONS  (PRN):  acetaminophen   Tablet .. 650 milliGRAM(s) Oral every 6 hours PRN Temp greater or equal to 38C (100.4F), Moderate Pain (4 - 6)  dextrose 40% Gel 15 Gram(s) Oral once PRN Blood Glucose LESS THAN 70 milliGRAM(s)/deciliter  docusate sodium 100 milliGRAM(s) Oral two times a day PRN Constipation  glucagon  Injectable 1 milliGRAM(s) IntraMuscular once PRN Glucose LESS THAN 70 milligrams/deciliter  senna 2 Tablet(s) Oral at bedtime PRN Constipation      HOME MEDS  	No Known Allergies:     Home Medications:   * Patient Currently Takes Medications as of 19-Sep-2018 20:45 documented in Structured Notes  · 	calcium carbonate 1250 mg (500 mg elemental calcium) oral tablet: 1 tab(s) orally 3 times a day, Last Dose Taken:    · 	pantoprazole 40 mg oral delayed release tablet: 1 tab(s) orally once a day (before a meal), Last Dose Taken:    · 	calcitriol 0.25 mcg oral capsule: 1 cap(s) orally 2 times a day, Last Dose Taken:    · 	sacubitril-valsartan 49 mg-51 mg oral tablet: 1 tab(s) orally 2 times a day, Last Dose Taken:    · 	Coreg 12.5 mg oral tablet: 1 tab(s) orally 2 times a day , Last Dose Taken:    · 	senna oral tablet: 2 tab(s) orally once a day (at bedtime), As needed, Constipation, Last Dose Taken:    · 	Colace 100 mg oral capsule: 1 cap(s) orally 2 times a day, As Needed -for constipation , Last Dose Taken:    · 	FeroSul 325 mg (65 mg elemental iron) oral tablet: 1 tab(s) orally once a day , Last Dose Taken:    · 	aspirin 81 mg oral delayed release tablet: 1 tab(s) orally once a day, Last Dose Taken:    · 	insulin glargine 100 units/mL subcutaneous solution: 16 unit(s) subcutaneous once a day (at bedtime) , Last Dose Taken:        .          PAST MEDICAL & SURGICAL HISTORY:  Pleural effusion  Pericardial effusion  End stage renal disease on dialysis  HLD (hyperlipidemia)  HTN (hypertension)  DM (diabetes mellitus)  A-V fistula  Encounter for dialysis catheter care      FAMILY HISTORY:  Family history of kidney disease in brother (Sibling)      SOCIAL HISTORY:    CIGARETTES:  No    ALCOHOL: No    DRUGS: No    Vital Signs Last 24 Hrs  T(C): 36.5 (20 Sep 2018 08:08), Max: 36.6 (19 Sep 2018 14:59)  T(F): 97.7 (20 Sep 2018 08:08), Max: 97.9 (19 Sep 2018 14:59)  HR: 77 (20 Sep 2018 08:08) (72 - 78)  BP: 145/82 (20 Sep 2018 08:08) (145/82 - 167/89)  BP(mean): --  RR: 16 (20 Sep 2018 08:08) (16 - 20)  SpO2: 92% (20 Sep 2018 08:08) (92% - 96%)    PHYSICAL EXAM:      Constitutional: No fever, chills,     Eyes: Not reddened, no discharge    ENMT: No discharge, No pain    Neck: No JVD, No Bruit    Back: No CVA tenderness    Respiratory: Bibasilar rales    Cardiovascular: RRR, Normal S1-2, No S3-, No friction rub 2/6 SM at apex    Gastrointestinal: Soft, NT/ND. BS+, No organomegaly    Extremities: trace ankle bl edema    Vascular: Distal pulses intact    Neurological: Alert, awake, oriented, able to move extremities, speach is clear    Skin: No ecchymosis, no rash    Musculoskeletal: Non tender, no weaknss    Psychiatric:  no anxiety        INTERPRETATION OF TELEMETRY:    ECG: SR, LAE, LAD, LVH ST-T abnl    I&O's Detail    19 Sep 2018 07:01  -  20 Sep 2018 07:00  --------------------------------------------------------  IN:    Other: 500 mL  Total IN: 500 mL    OUT:    Other: 3000 mL  Total OUT: 3000 mL    Total NET: -2500 mL          LABS:                        10.0   7.3   )-----------( 315      ( 20 Sep 2018 06:35 )             34.0     09-20    132<L>  |  91<L>  |  23.0<H>  ----------------------------<  212<H>  3.8   |  28.0  |  3.25<H>    Ca    8.6      20 Sep 2018 06:35  Phos  3.1     09-19  Mg     2.2     09-19    TPro  7.3  /  Alb  3.0<L>  /  TBili  0.6  /  DBili  x   /  AST  44<H>  /  ALT  10  /  AlkPhos  108  09-19    CARDIAC MARKERS ( 20 Sep 2018 00:49 )  x     / 0.59 ng/mL / x     / x     / x          PT/INR - ( 19 Sep 2018 16:12 )   PT: 12.8 sec;   INR: 1.16 ratio         PTT - ( 19 Sep 2018 16:12 )  PTT:25.5 sec    I&O's Summary    19 Sep 2018 07:01  -  20 Sep 2018 07:00  --------------------------------------------------------  IN: 500 mL / OUT: 3000 mL / NET: -2500 mL CARDIOLOGY CONSULTATION NOTE   Consult requested by:      Reason for Consultation:     History obtained by: Patient, family and medical record     obtained: No    Chief complaint:    Patient is a 58y old  Female who presents with a chief complaint of Volume Overload (20 Sep 2018 02:51)      HPI:      Primary Cardiologist:  Dr. Salazar      58F hx DVT, s/p IVC filter, NICM (Cath 3/2018),  HFrEF with worsened EF Echo 8/31/2018: EF 20-25. DD 2. Mod MR. (Echo 5/2018: EF 45-50. DD 1.Trivial Peric Eff. LHC 3/2018: Ventriculography EF 55.Mild MLI in coronaries), h/o peric eff, s/p recent Pigtail catheter for R pleural effusion, Afib not on AC due to GI bleed,  ESRD MWF HD, anemia who presents to ED after recommended to come to hospital by her home nurse due to SOB. Admits non-productive cough, orthopnea, HA, CP. Denied fever, chills, palpitation, vomiting hematuria, syncope.     REVIEW OF SYMPTOMS:   Constitutional: Denied: fever, chills, weight loss or gain  Eyes: Denied: reddened ayes, eye discharge, eye pain  ENMT: Denied: ear pain, nasal discharge, mouth pain, throat pain or swelling  Cardiovascular: Denied: palpitation, arrhythmia, irregular or fast heart beats,   Respiratory: Denied: cough, phlegm production, wheezes, dyspnea, orthopnea, PND,   GI: Denied: Abdominal pain, nausea, vomiting, diarrhea, constipation, melena, rectal bleed  : Denied: hematuria, frequency  Musculoskeletal: Denied: Muscle aches, weakness, pain  Hematology/lymp: Denied: Bleeding disorder, anemia, blood clotting  Neuro: Denied: light headedness, dizziness, numbness, aphasia, dysarthria, seizure,  syncope, near syncope  Psych: Denied: depression, anxiety  Integumentary/skin: Denied: rash, bruises, ecchymosis, itching  Allergy/Immunology: denied environmental or drug allergies    ALL OTHER REVIEW OF SYSTEMS ARE NEGATIVE.    MEDICATIONS  (STANDING):  aspirin enteric coated 81 milliGRAM(s) Oral daily  calcitriol   Capsule 0.25 MICROGram(s) Oral daily  calcium carbonate   1250 mG (OsCal) 1 Tablet(s) Oral three times a day  carvedilol 12.5 milliGRAM(s) Oral every 12 hours  dextrose 5%. 1000 milliLiter(s) (50 mL/Hr) IV Continuous <Continuous>  dextrose 50% Injectable 12.5 Gram(s) IV Push once  epoetin gian Injectable 36724 Unit(s) IV Push <User Schedule>  heparin  Injectable 5000 Unit(s) SubCutaneous every 8 hours  insulin glargine Injectable (LANTUS) 16 Unit(s) SubCutaneous at bedtime  insulin lispro (HumaLOG) corrective regimen sliding scale   SubCutaneous three times a day before meals  pantoprazole    Tablet 40 milliGRAM(s) Oral before breakfast  sacubitril 49 mG/valsartan 51 mG 1 Tablet(s) Oral two times a day    MEDICATIONS  (PRN):  acetaminophen   Tablet .. 650 milliGRAM(s) Oral every 6 hours PRN Temp greater or equal to 38C (100.4F), Moderate Pain (4 - 6)  dextrose 40% Gel 15 Gram(s) Oral once PRN Blood Glucose LESS THAN 70 milliGRAM(s)/deciliter  docusate sodium 100 milliGRAM(s) Oral two times a day PRN Constipation  glucagon  Injectable 1 milliGRAM(s) IntraMuscular once PRN Glucose LESS THAN 70 milligrams/deciliter  senna 2 Tablet(s) Oral at bedtime PRN Constipation      HOME MEDS  	No Known Allergies:     Home Medications:   * Patient Currently Takes Medications as of 19-Sep-2018 20:45 documented in Structured Notes  · 	calcium carbonate 1250 mg (500 mg elemental calcium) oral tablet: 1 tab(s) orally 3 times a day, Last Dose Taken:    · 	pantoprazole 40 mg oral delayed release tablet: 1 tab(s) orally once a day (before a meal), Last Dose Taken:    · 	calcitriol 0.25 mcg oral capsule: 1 cap(s) orally 2 times a day, Last Dose Taken:    · 	sacubitril-valsartan 49 mg-51 mg oral tablet: 1 tab(s) orally 2 times a day, Last Dose Taken:    · 	Coreg 12.5 mg oral tablet: 1 tab(s) orally 2 times a day , Last Dose Taken:    · 	senna oral tablet: 2 tab(s) orally once a day (at bedtime), As needed, Constipation, Last Dose Taken:    · 	Colace 100 mg oral capsule: 1 cap(s) orally 2 times a day, As Needed -for constipation , Last Dose Taken:    · 	FeroSul 325 mg (65 mg elemental iron) oral tablet: 1 tab(s) orally once a day , Last Dose Taken:    · 	aspirin 81 mg oral delayed release tablet: 1 tab(s) orally once a day, Last Dose Taken:    · 	insulin glargine 100 units/mL subcutaneous solution: 16 unit(s) subcutaneous once a day (at bedtime) , Last Dose Taken:        .          PAST MEDICAL & SURGICAL HISTORY:  Pleural effusion  Pericardial effusion  End stage renal disease on dialysis  HLD (hyperlipidemia)  HTN (hypertension)  DM (diabetes mellitus)  A-V fistula  Encounter for dialysis catheter care      FAMILY HISTORY:  Family history of kidney disease in brother (Sibling)      SOCIAL HISTORY:    CIGARETTES:  No    ALCOHOL: No    DRUGS: No    Vital Signs Last 24 Hrs  T(C): 36.5 (20 Sep 2018 08:08), Max: 36.6 (19 Sep 2018 14:59)  T(F): 97.7 (20 Sep 2018 08:08), Max: 97.9 (19 Sep 2018 14:59)  HR: 77 (20 Sep 2018 08:08) (72 - 78)  BP: 145/82 (20 Sep 2018 08:08) (145/82 - 167/89)  BP(mean): --  RR: 16 (20 Sep 2018 08:08) (16 - 20)  SpO2: 92% (20 Sep 2018 08:08) (92% - 96%)    PHYSICAL EXAM:      Constitutional: No fever, chills,     Eyes: Not reddened, no discharge    ENMT: No discharge, No pain    Neck: No JVD, No Bruit    Back: No CVA tenderness    Respiratory: Bibasilar rales    Cardiovascular: RRR, Normal S1-2, No S3-, No friction rub 2/6 SM at apex    Gastrointestinal: Soft, NT/ND. BS+, No organomegaly    Extremities: trace ankle bl edema    Vascular: Distal pulses intact    Neurological: Alert, awake, oriented, able to move extremities, speach is clear    Skin: No ecchymosis, no rash    Musculoskeletal: Non tender, no weaknss    Psychiatric:  no anxiety        INTERPRETATION OF TELEMETRY:    ECG: SR, LAE, LAD, LVH ST-T abnl    I&O's Detail    19 Sep 2018 07:01  -  20 Sep 2018 07:00  --------------------------------------------------------  IN:    Other: 500 mL  Total IN: 500 mL    OUT:    Other: 3000 mL  Total OUT: 3000 mL    Total NET: -2500 mL          LABS:                        10.0   7.3   )-----------( 315      ( 20 Sep 2018 06:35 )             34.0     09-20    132<L>  |  91<L>  |  23.0<H>  ----------------------------<  212<H>  3.8   |  28.0  |  3.25<H>    Ca    8.6      20 Sep 2018 06:35  Phos  3.1     09-19  Mg     2.2     09-19    TPro  7.3  /  Alb  3.0<L>  /  TBili  0.6  /  DBili  x   /  AST  44<H>  /  ALT  10  /  AlkPhos  108  09-19    CARDIAC MARKERS ( 20 Sep 2018 00:49 )  x     / 0.59 ng/mL / x     / x     / x          PT/INR - ( 19 Sep 2018 16:12 )   PT: 12.8 sec;   INR: 1.16 ratio         PTT - ( 19 Sep 2018 16:12 )  PTT:25.5 sec    I&O's Summary    19 Sep 2018 07:01  -  20 Sep 2018 07:00  --------------------------------------------------------  IN: 500 mL / OUT: 3000 mL / NET: -2500 mL

## 2018-09-20 NOTE — CHART NOTE - NSCHARTNOTEFT_GEN_A_CORE
Patients troponin reviewed and elevated at 0.59, patient asymptomatic, likely elevated in setting of ESRD. Will continue to trend. If continues to trend upward will consult cardiology. Troponin on previous admission 0.84.

## 2018-09-20 NOTE — CHART NOTE - NSCHARTNOTEFT_GEN_A_CORE
Called to bedside by nursing staff as patient c/o chest pain and SOB at approx 2:10 AM.   Patient promptly evaluated at bedside CDU 11L.   [place] Islander speaking Nurse Salinas available at bedside to translate while [employer] service was being connected to.   Intepreter Ida [id number] used  Patient originally had R sided chest pain and SOB with laying upright, but improved throughout the evaluation. She was noted to have headache for past 1.5 days, had chest pain and SOB but improved during my evaluation. She is s/p HD treatment earlier on 9/19 at night.   She had no abd pain, lightheadedness, dizziness, nausea or vomiting.  All other ROS negative except as stated above.     T(C): 36.4 (09-19-18 @ 22:40), Max: 36.6 (09-19-18 @ 14:59)  HR: 74 (09-19-18 @ 22:40) (72 - 78)  BP: 167/89 (09-19-18 @ 22:40) (158/86 - 167/89)  RR: 18 (09-19-18 @ 22:40) (18 - 20)  SpO2: 96% (09-19-18 @ 22:40) (93% - 96%)  Wt(kg): --    Physical Exam:   GENERAL: well-groomed, well-developed, respiratory distress then improved to NAD  HEENT: head NC/AT; EOM intact, conjunctiva & sclera clear; hearing grossly intact, moist mucous membranes  NECK: supple, no JVD  RESPIRATORY: decreased BS at lung bases, no wheezing  CARDIOVASCULAR: S1&S2, RRR, no murmurs or gallops, no TTP of chest wall  ABDOMEN: soft, non-tender, non-distended, + Bowel sounds x4 quadrants, no guarding, rebound or rigidity  MUSCULOSKELETAL:  no clubbing, cyanosis or edema of all 4 extremities  LYMPH: no cervical lymphadenopathy  VASCULAR: Radial pulses 2+ bilaterally, no varicose veins   SKIN: warm and dry, color normal  NEUROLOGIC: AA&O X3, CN2-12 intact w/ no focal deficits, no sensory loss, motor Strength 5/5 in UE & LE B/L  Psych: Normal mood and affect, normal behavior    Plan:   Chest Pain: likely in setting of volume overload, troponin mildly elevated though in setting of ESRD. EKG performed at 2:21 AM shows NSR without acute ST-T wave changes.     -Continue to trend.       -Will consult Cardiology.      -Place on continuous pulse ox and telemetry.      -Plan of Care discussed with EARNEST Carter      HFrEF: Will give additional IV Lasix 40mg x1.   HTN: BP elevated. Will give lasix as stated.        -Continue Coreg.

## 2018-09-20 NOTE — PROGRESS NOTE ADULT - ASSESSMENT
Pt is a 57 yo F with PMHx of PNA with parapneumonic effusion s/p Chest tube on last admission, ESRD on HD, NICM/ CHF with EF 20-25%, nonobstructive CAD, HTN, DM2, PAF, h/o GI bleed, presents after recommended to come to hospital by her home nurse due to SOB with some cough. In ED noted to have volume overload, pleural effusion, hyperkalemia. Seen by nephrology in ED and emergent HD done due to volume overload. Nephrology /cardiology on board.     Volume overload 2/2 ESRD and CHF:  - S/p HD last night. Still with SOB.  - Nephrology on board, HD again today per renal.    - C/w home medicine.  - Cardiology /renal on board.    Pleural Effusion:  - CXR with effusion, no signs of infection. Due to above.  - C/w fluid removal with HD and repeat CXR tomorrow.  - If not improving- may consider pleural tap    Acute on chronic combined CHF:  - EF 20-25%- worsened from EF 45-50%  - C/w ASA / Coreg/ Entresto. C/w HD for fluid removal.  - Cardiology following.     >H/o ESRD- Plan as above.  >Chronic troponin elevation- Likely from ESRD, no CP now, Card on board.   >H/o HTN- Better with HD, c/w home meds  >H/o PAF- Not on AC due to hx of GIB, C/w Coreg, ASA. Card following.  >H/o DM- C/w Lantus 16 units, LSS + FS monitoring.  >H/o AOCD- Stable hgb, C/w epogen with HD.

## 2018-09-20 NOTE — PROGRESS NOTE ADULT - SUBJECTIVE AND OBJECTIVE BOX
Called to bedside by nursing staff as patient c/o chest pain and SOB at approx 2:10 AM.   Patient promptly evaluated at bedside CDU 11L.   Ukrainian speaking Nurse Salinas available at bedside to translate while [employer] service was being connected to.   Intepreter Ida [id number] used  Patient originally had R sided chest pain and SOB with laying upright, but improved throughout the evaluation. She was noted to have headache for past 1.5 days, had chest pain and SOB but improved during my evaluation. She is s/p HD treatment earlier on 9/19 at night.   She had no abd pain, lightheadedness, dizziness, nausea or vomiting.  All other ROS negative except as stated above.       T(C): 36.4 (09-19-18 @ 22:40), Max: 36.6 (09-19-18 @ 14:59)  HR: 74 (09-19-18 @ 22:40) (72 - 78)  BP: 167/89 (09-19-18 @ 22:40) (158/86 - 167/89)  RR: 18 (09-19-18 @ 22:40) (18 - 20)  SpO2: 96% (09-19-18 @ 22:40) (93% - 96%)  Wt(kg): --    Physical Exam:   GENERAL: well-groomed, well-developed, respiratory distress then improved to NAD  HEENT: head NC/AT; EOM intact, conjunctiva & sclera clear; hearing grossly intact, moist mucous membranes  NECK: supple, no JVD  RESPIRATORY: decreased BS at lung bases, no wheezing  CARDIOVASCULAR: S1&S2, RRR, no murmurs or gallops, no TTP of chest wall  ABDOMEN: soft, non-tender, non-distended, + Bowel sounds x4 quadrants, no guarding, rebound or rigidity  MUSCULOSKELETAL:  no clubbing, cyanosis or edema of all 4 extremities  LYMPH: no cervical lymphadenopathy  VASCULAR: Radial pulses 2+ bilaterally, no varicose veins   SKIN: warm and dry, color normal  NEUROLOGIC: AA&O X3, CN2-12 intact w/ no focal deficits, no sensory loss, motor Strength 5/5 in UE & LE B/L  Psych: Normal mood and affect, normal behavior    Previous note at 2:35 was written as an event note, however it is a progress note and being entered now as a progress note and previous "Event Note" will be deleted

## 2018-09-20 NOTE — PATIENT PROFILE ADULT. - LANGUAGE ASSISTANCE NEEDED
Yes-Patient/Caregiver accepts free interpretation services.../[facility]  was called

## 2018-09-20 NOTE — CONSULT NOTE ADULT - ASSESSMENT
58F hx DVT, s/p IVC filter, NICM (Cath 3/2018),  HFrEF with worsened EF Echo 8/31/2018: EF 20-25. DD 2. Mod MR. (Echo 5/2018: EF 45-50. DD 1.Trivial Peric Eff. C 3/2018: Ventriculography EF 55.Mild MLI in coronaries), h/o peric eff, s/p recent Pigtail catheter for R pleural effusion, Afib not on AC due to GI bleed,  ESRD MWF HD, anemia who presented woth SOB and noted to have volume overload. Admits non-productive cough, orthopnea, HA, CP.       NICM HFrEF with   Acute on Chronic combined systolic and diastolic congestive heart failure  Worsened EF Echo 8/31/2018: EF 20-25. DD 2. Mod MR. From EF of 45-50 in 5/2018.  Hypervolemic, PBNP >70K  CXR 9/19: Lg bl Pleural eff. R>L.  May need IR - thoracentesis.   Obtain FU TTE     	  ESRD on HD MWF schedule  Hyperkalemia  Hypervolemic  will need  HD for volume control  Nephrology on the case  Anemia: Epo    Chest Pain  Abnl Trop  Chronically elevated in presence of ESRD  Trend cardiac enzymes  Nonspecific EKG changes.   Cont aspirin and coreg.    Paroxysmal Afib  Currently in NSR  Unable to tolerate coumadin due to GI bleed.   Was being evaluated for Watchman procedure, pt didn't follow up.  Cont telemetry monitoring.     h/o Pericardial effusion  FU Echo requested    HTN: Monitor BP    HA  As pe primary team      CARDIO MEDS  aspirin enteric coated 81 milliGRAM(s) Oral daily  carvedilol 12.5 milliGRAM(s) Oral every 12 hours  sacubitril 49 mG/valsartan 51 mG 1 Tablet(s) Oral two times a day 58F hx DVT, s/p IVC filter, NICM (Cath 3/2018),  HFrEF with worsened EF Echo 8/31/2018: EF 20-25. DD 2. Mod MR. (Echo 5/2018: EF 45-50. DD 1.Trivial Peric Eff. C 3/2018: Ventriculography EF 55.Mild MLI in coronaries), h/o peric eff, s/p recent Pigtail catheter for R pleural effusion, Afib not on AC due to GI bleed,  ESRD MWF HD, anemia who presented woth SOB and noted to have volume overload. Admits non-productive cough, orthopnea, HA, CP.       NICM HFrEF with   Acute on Chronic combined systolic and diastolic congestive heart failure  Worsened EF Echo 8/31/2018: EF 20-25. DD 2. Mod MR. From EF of 45-50 in 5/2018.  Hypervolemic, PBNP >70K  CXR 9/19: Lg bl Pleural eff. R>L.  May need IR - thoracentesis.   Obtain FU TTE     	  ESRD on HD MWF schedule  Hyperkalemia  Hypervolemic  will need  HD for volume control  Nephrology on the case  Anemia: Epo    Chest Pain  Abnl Trop  Chronically elevated in presence of ESRD  Trend cardiac enzymes  Nonspecific EKG changes.   Cont aspirin and coreg.    Paroxysmal Afib  Currently in NSR  Unable to tolerate coumadin due to GI bleed.   Was being evaluated for Watchman procedure, pt didn't follow up.  Cont telemetry monitoring.     h/o Pericardial effusion  FU Echo requested    HTN: Monitor BP    HA  As pe primary team      CARDIO MEDS  aspirin enteric coated 81 milliGRAM(s) Oral daily  carvedilol 12.5 milliGRAM(s) Oral every 12 hours  sacubitril 49 mG/valsartan 51 mG 1 Tablet(s) Oral two times a day            Primary Cardiologist:  Dr. Salazar to Follow tomorrow

## 2018-09-20 NOTE — PROGRESS NOTE ADULT - SUBJECTIVE AND OBJECTIVE BOX
NEPHROLOGY INTERVAL HPI/OVERNIGHT EVENTS: SOBE.    MEDICATIONS  (STANDING):  aspirin enteric coated 81 milliGRAM(s) Oral daily  calcitriol   Capsule 0.25 MICROGram(s) Oral daily  calcium carbonate   1250 mG (OsCal) 1 Tablet(s) Oral three times a day  carvedilol 12.5 milliGRAM(s) Oral every 12 hours  chlorhexidine 2% Cloths 1 Application(s) Topical <User Schedule>  dextrose 5%. 1000 milliLiter(s) (50 mL/Hr) IV Continuous <Continuous>  dextrose 50% Injectable 12.5 Gram(s) IV Push once  epoetin gian Injectable 25743 Unit(s) IV Push <User Schedule>  heparin  Injectable 5000 Unit(s) SubCutaneous every 8 hours  insulin glargine Injectable (LANTUS) 16 Unit(s) SubCutaneous at bedtime  insulin lispro (HumaLOG) corrective regimen sliding scale   SubCutaneous three times a day before meals  pantoprazole    Tablet 40 milliGRAM(s) Oral before breakfast  sacubitril 49 mG/valsartan 51 mG 1 Tablet(s) Oral two times a day    MEDICATIONS  (PRN):  acetaminophen   Tablet .. 650 milliGRAM(s) Oral every 6 hours PRN Temp greater or equal to 38C (100.4F), Moderate Pain (4 - 6)  dextrose 40% Gel 15 Gram(s) Oral once PRN Blood Glucose LESS THAN 70 milliGRAM(s)/deciliter  docusate sodium 100 milliGRAM(s) Oral two times a day PRN Constipation  glucagon  Injectable 1 milliGRAM(s) IntraMuscular once PRN Glucose LESS THAN 70 milligrams/deciliter  senna 2 Tablet(s) Oral at bedtime PRN Constipation      Allergies    No Known Allergies          Vital Signs Last 24 Hrs  T(C): 36.4 (20 Sep 2018 11:06), Max: 36.6 (19 Sep 2018 14:59)  T(F): 97.6 (20 Sep 2018 11:06), Max: 97.9 (19 Sep 2018 14:59)  HR: 77 (20 Sep 2018 11:06) (72 - 78)  BP: 148/91 (20 Sep 2018 11:06) (145/82 - 167/89)  BP(mean): --  RR: 17 (20 Sep 2018 11:06) (16 - 20)  SpO2: 95% (20 Sep 2018 11:06) (92% - 96%)  Daily Height in cm: 165.1 (20 Sep 2018 02:51)        PHYSICAL EXAM:    GENERAL: appears acutely and chronically ill  HEAD:    EYES: wnl  ENMT:   NECK: veins wnl  NERVOUS SYSTEM: wnl   CHEST/LUNG: bilateral rhonchi entire lower lobes  HEART: no rub  ABDOMEN: not tender  EXTREMITIES:  left arm access with bruit  LYMPH:   SKIN: no rash    LABS:                        10.0   7.3   )-----------( 315      ( 20 Sep 2018 06:35 )             34.0     09-20    132<L>  |  91<L>  |  23.0<H>  ----------------------------<  212<H>  3.8   |  28.0  |  3.25<H>    Ca    8.6      20 Sep 2018 06:35  Phos  3.1     09-19  Mg     2.2     09-19    TPro  7.3  /  Alb  3.0<L>  /  TBili  0.6  /  DBili  x   /  AST  44<H>  /  ALT  10  /  AlkPhos  108  09-19    PT/INR - ( 19 Sep 2018 16:12 )   PT: 12.8 sec;   INR: 1.16 ratio         PTT - ( 19 Sep 2018 16:12 )  PTT:25.5 sec    Magnesium, Serum: 2.2 mg/dL (09-19 @ 18:35)  Phosphorus Level, Serum: 3.1 mg/dL (09-19 @ 18:35)          RADIOLOGY & ADDITIONAL TESTS:

## 2018-09-20 NOTE — PROGRESS NOTE ADULT - SUBJECTIVE AND OBJECTIVE BOX
JAROCHO MORALES Female 58y [id number]    Patient is a 58y old  Female who presents with a chief complaint of Volume Overload (20 Sep 2018 13:52)      Subjective/objective:  Pt seen and examined at bedside, no over night event reported by night staff. Pt interviewed with , she is poor historian, states I came to ER because my BP was low at home, states my breathing is OK.  r    Review of system:  Limited but no fever, chills, nausea, vomiting, headache, dizziness.       PHYSICAL EXAM:    Vital Signs Last 24 Hrs  T(C): 36.4 (20 Sep 2018 11:06), Max: 36.6 (19 Sep 2018 14:59)  T(F): 97.6 (20 Sep 2018 11:06), Max: 97.9 (19 Sep 2018 14:59)  HR: 77 (20 Sep 2018 11:06) (72 - 78)  BP: 148/91 (20 Sep 2018 11:06) (145/82 - 167/89)  BP(mean): --  RR: 17 (20 Sep 2018 11:06) (16 - 20)  SpO2: 95% (20 Sep 2018 11:06) (92% - 96%)    GENERAL: Pt lying comfortably, NAD.  CHEST/LUNG: Bibasilar rales+, No wheezing.  HEART: S1S2+, Regular rate and rhythm; No murmurs.  ABDOMEN: Soft, Nontender, Nondistended; Bowel sounds present.  Extremities: No LE edema, pulses+, LUE AVF with thril  NEURO: AAOX3, no focal deficits, no motor r sensory loss.  PSYCH: normal mood.        MEDICATIONS  (STANDING):  aspirin enteric coated 81 milliGRAM(s) Oral daily  calcitriol   Capsule 0.25 MICROGram(s) Oral daily  calcium carbonate   1250 mG (OsCal) 1 Tablet(s) Oral three times a day  carvedilol 12.5 milliGRAM(s) Oral every 12 hours  chlorhexidine 2% Cloths 1 Application(s) Topical <User Schedule>  dextrose 5%. 1000 milliLiter(s) (50 mL/Hr) IV Continuous <Continuous>  dextrose 50% Injectable 12.5 Gram(s) IV Push once  epoetin gian Injectable 68383 Unit(s) IV Push <User Schedule>  heparin  Injectable 5000 Unit(s) SubCutaneous every 8 hours  insulin glargine Injectable (LANTUS) 16 Unit(s) SubCutaneous at bedtime  insulin lispro (HumaLOG) corrective regimen sliding scale   SubCutaneous three times a day before meals  pantoprazole    Tablet 40 milliGRAM(s) Oral before breakfast  sacubitril 49 mG/valsartan 51 mG 1 Tablet(s) Oral two times a day    MEDICATIONS  (PRN):  acetaminophen   Tablet .. 650 milliGRAM(s) Oral every 6 hours PRN Temp greater or equal to 38C (100.4F), Moderate Pain (4 - 6)  dextrose 40% Gel 15 Gram(s) Oral once PRN Blood Glucose LESS THAN 70 milliGRAM(s)/deciliter  docusate sodium 100 milliGRAM(s) Oral two times a day PRN Constipation  glucagon  Injectable 1 milliGRAM(s) IntraMuscular once PRN Glucose LESS THAN 70 milligrams/deciliter  senna 2 Tablet(s) Oral at bedtime PRN Constipation        Labs:  LABS:                        10.0   7.3   )-----------( 315      ( 20 Sep 2018 06:35 )             34.0     09-20    132<L>  |  91<L>  |  23.0<H>  ----------------------------<  212<H>  3.8   |  28.0  |  3.25<H>    Ca    8.6      20 Sep 2018 06:35  Phos  3.1     09-19  Mg     2.2     09-19    TPro  7.3  /  Alb  3.0<L>  /  TBili  0.6  /  DBili  x   /  AST  44<H>  /  ALT  10  /  AlkPhos  108  09-19    PT/INR - ( 19 Sep 2018 16:12 )   PT: 12.8 sec;   INR: 1.16 ratio         PTT - ( 19 Sep 2018 16:12 )  PTT:25.5 sec    LIVER FUNCTIONS - ( 19 Sep 2018 18:35 )  Alb: 3.0 g/dL / Pro: 7.3 g/dL / ALK PHOS: 108 U/L / ALT: 10 U/L / AST: 44 U/L / GGT: x               CARDIAC MARKERS ( 20 Sep 2018 11:44 )  x     / 0.53 ng/mL / x     / x     / x      CARDIAC MARKERS ( 20 Sep 2018 00:49 )  x     / 0.59 ng/mL / x     / x     / x

## 2018-09-20 NOTE — PATIENT PROFILE ADULT. - TEACHING/LEARNING LEARNING PREFERENCES
skill demonstration/verbal instruction/individual instruction skill demonstration/verbal instruction/individual instruction/written material

## 2018-09-21 LAB
ANION GAP SERPL CALC-SCNC: 10 MMOL/L — SIGNIFICANT CHANGE UP (ref 5–17)
BUN SERPL-MCNC: 17 MG/DL — SIGNIFICANT CHANGE UP (ref 8–20)
CALCIUM SERPL-MCNC: 9 MG/DL — SIGNIFICANT CHANGE UP (ref 8.6–10.2)
CHLORIDE SERPL-SCNC: 97 MMOL/L — LOW (ref 98–107)
CO2 SERPL-SCNC: 32 MMOL/L — HIGH (ref 22–29)
CREAT SERPL-MCNC: 2.77 MG/DL — HIGH (ref 0.5–1.3)
GLUCOSE BLDC GLUCOMTR-MCNC: 113 MG/DL — HIGH (ref 70–99)
GLUCOSE BLDC GLUCOMTR-MCNC: 191 MG/DL — HIGH (ref 70–99)
GLUCOSE BLDC GLUCOMTR-MCNC: 200 MG/DL — HIGH (ref 70–99)
GLUCOSE BLDC GLUCOMTR-MCNC: 201 MG/DL — HIGH (ref 70–99)
GLUCOSE BLDC GLUCOMTR-MCNC: 80 MG/DL — SIGNIFICANT CHANGE UP (ref 70–99)
GLUCOSE SERPL-MCNC: 92 MG/DL — SIGNIFICANT CHANGE UP (ref 70–115)
HCT VFR BLD CALC: 35.7 % — LOW (ref 37–47)
HGB BLD-MCNC: 10.4 G/DL — LOW (ref 12–16)
MAGNESIUM SERPL-MCNC: 2.2 MG/DL — SIGNIFICANT CHANGE UP (ref 1.6–2.6)
MCHC RBC-ENTMCNC: 27.4 PG — SIGNIFICANT CHANGE UP (ref 27–31)
MCHC RBC-ENTMCNC: 29.1 G/DL — LOW (ref 32–36)
MCV RBC AUTO: 93.9 FL — SIGNIFICANT CHANGE UP (ref 81–99)
MRSA PCR RESULT.: DETECTED
PHOSPHATE SERPL-MCNC: 2.7 MG/DL — SIGNIFICANT CHANGE UP (ref 2.4–4.7)
PLATELET # BLD AUTO: 350 K/UL — SIGNIFICANT CHANGE UP (ref 150–400)
POTASSIUM SERPL-MCNC: 3.9 MMOL/L — SIGNIFICANT CHANGE UP (ref 3.5–5.3)
POTASSIUM SERPL-SCNC: 3.9 MMOL/L — SIGNIFICANT CHANGE UP (ref 3.5–5.3)
RBC # BLD: 3.8 M/UL — LOW (ref 4.4–5.2)
RBC # FLD: 15.9 % — HIGH (ref 11–15.6)
S AUREUS DNA NOSE QL NAA+PROBE: DETECTED
SODIUM SERPL-SCNC: 139 MMOL/L — SIGNIFICANT CHANGE UP (ref 135–145)
WBC # BLD: 6.6 K/UL — SIGNIFICANT CHANGE UP (ref 4.8–10.8)
WBC # FLD AUTO: 6.6 K/UL — SIGNIFICANT CHANGE UP (ref 4.8–10.8)

## 2018-09-21 PROCEDURE — 99233 SBSQ HOSP IP/OBS HIGH 50: CPT

## 2018-09-21 PROCEDURE — 93308 TTE F-UP OR LMTD: CPT | Mod: 26

## 2018-09-21 PROCEDURE — 71045 X-RAY EXAM CHEST 1 VIEW: CPT | Mod: 26

## 2018-09-21 RX ORDER — SACUBITRIL AND VALSARTAN 24; 26 MG/1; MG/1
1 TABLET, FILM COATED ORAL
Qty: 0 | Refills: 0 | Status: DISCONTINUED | OUTPATIENT
Start: 2018-09-21 | End: 2018-09-26

## 2018-09-21 RX ORDER — MUPIROCIN 20 MG/G
1 OINTMENT TOPICAL
Qty: 0 | Refills: 0 | Status: DISCONTINUED | OUTPATIENT
Start: 2018-09-21 | End: 2018-09-26

## 2018-09-21 RX ORDER — INSULIN GLARGINE 100 [IU]/ML
10 INJECTION, SOLUTION SUBCUTANEOUS AT BEDTIME
Qty: 0 | Refills: 0 | Status: DISCONTINUED | OUTPATIENT
Start: 2018-09-21 | End: 2018-09-23

## 2018-09-21 RX ADMIN — Medication 1 TABLET(S): at 05:56

## 2018-09-21 RX ADMIN — Medication 1 TABLET(S): at 22:30

## 2018-09-21 RX ADMIN — HEPARIN SODIUM 5000 UNIT(S): 5000 INJECTION INTRAVENOUS; SUBCUTANEOUS at 23:03

## 2018-09-21 RX ADMIN — CALCITRIOL 0.25 MICROGRAM(S): 0.5 CAPSULE ORAL at 12:19

## 2018-09-21 RX ADMIN — SACUBITRIL AND VALSARTAN 1 TABLET(S): 24; 26 TABLET, FILM COATED ORAL at 22:28

## 2018-09-21 RX ADMIN — MUPIROCIN 1 APPLICATION(S): 20 OINTMENT TOPICAL at 22:28

## 2018-09-21 RX ADMIN — Medication 1: at 17:43

## 2018-09-21 RX ADMIN — CARVEDILOL PHOSPHATE 12.5 MILLIGRAM(S): 80 CAPSULE, EXTENDED RELEASE ORAL at 17:43

## 2018-09-21 RX ADMIN — Medication 81 MILLIGRAM(S): at 12:19

## 2018-09-21 RX ADMIN — HEPARIN SODIUM 5000 UNIT(S): 5000 INJECTION INTRAVENOUS; SUBCUTANEOUS at 05:57

## 2018-09-21 RX ADMIN — ERYTHROPOIETIN 10000 UNIT(S): 10000 INJECTION, SOLUTION INTRAVENOUS; SUBCUTANEOUS at 19:13

## 2018-09-21 RX ADMIN — CHLORHEXIDINE GLUCONATE 1 APPLICATION(S): 213 SOLUTION TOPICAL at 10:38

## 2018-09-21 RX ADMIN — Medication 3 MILLILITER(S): at 20:48

## 2018-09-21 RX ADMIN — CARVEDILOL PHOSPHATE 12.5 MILLIGRAM(S): 80 CAPSULE, EXTENDED RELEASE ORAL at 05:56

## 2018-09-21 RX ADMIN — HEPARIN SODIUM 5000 UNIT(S): 5000 INJECTION INTRAVENOUS; SUBCUTANEOUS at 14:25

## 2018-09-21 RX ADMIN — PANTOPRAZOLE SODIUM 40 MILLIGRAM(S): 20 TABLET, DELAYED RELEASE ORAL at 05:58

## 2018-09-21 RX ADMIN — Medication 3 MILLILITER(S): at 15:11

## 2018-09-21 RX ADMIN — SACUBITRIL AND VALSARTAN 1 TABLET(S): 24; 26 TABLET, FILM COATED ORAL at 05:57

## 2018-09-21 RX ADMIN — Medication 3 MILLILITER(S): at 08:28

## 2018-09-21 RX ADMIN — INSULIN GLARGINE 10 UNIT(S): 100 INJECTION, SOLUTION SUBCUTANEOUS at 22:29

## 2018-09-21 RX ADMIN — Medication 1 TABLET(S): at 14:25

## 2018-09-21 NOTE — PROGRESS NOTE ADULT - ASSESSMENT
58F hx DVT, s/p IVC filter, NICM (Cath 3/2018),  HFrEF with worsened EF Echo 8/31/2018: EF 20-25%, moderate diastolic dysfunction, moderate MR, moderate TR, pericardial effusion s/p pericardiocentesis, pleural effusion s/p thoracentesis, paroxysmal atrial fibrillation not on anticoagulation due to major GIB on AC, ESRD on HD, anemia of chronic disease presented with decompensated systolic heart failure, volume overload.  Multiple re-admissions for heart failure which are likely due to dietary non-compliance.        # NICM HFrEF LVEF 20-25% - presented decompensated, wet and warm, with symptom improvement with HD.  Multiple readmissions for decompensated systolic heart failure.  Spoke with family (daughter in law Bhakti) who reports that she is compliant with medications and hemodialysis sessions at home.  However, does notice that she is not compliant with a low sodium diet.  Continue carvedilol, routine removal of fluid via HD.  Will uptitrate entresto for additional afterload reduction/diuresis. Valvular regurgitation does not sound severe on exam. Had lengthy discussion regarding avoiding salty foods with the . Dietary consult.   # paroxysmal atrial fibrillation - failed AC in setting of major GI bleed, currently off AC.  Was previously referred for Watchman, missed appointments in the office.  Continue carvedilol.  # pericardial effusion - no s/s of tamponade, ok to repeat TTE  # ESRD - HD as scheduled  # pleural effusions - patient currently feeling better, not much by oxygen requirements, continue to follow, may not need therapeutic thoracentesis at this point    DVT prophylaxis - hep sq    D/W Dr. Lr  Thank you for allowing me to participate in care of your patient.

## 2018-09-21 NOTE — PROGRESS NOTE ADULT - SUBJECTIVE AND OBJECTIVE BOX
[facility]                                                        Office: [address] [address]                                                       Telephone: [phone]. Fax:[phone]      CARDIOLOGY PROGRESS NOTE   ([facility])    Subjective: Patient seen and examined.  "Destiny allen".  Spoke with daughter in law Bhakti who reports that she often catches her     ROS: No headache, no chest pain, no SOB, no palpitations, no dizziness, no nausea, no bleeding    Chronic Conditions:     CURRENT MEDICATIONS  carvedilol 12.5 milliGRAM(s) Oral every 12 hours  sacubitril 49 mG/valsartan 51 mG 1 Tablet(s) Oral two times a day      ALBUTerol/ipratropium for Nebulization 3 milliLiter(s) Nebulizer every 6 hours    acetaminophen   Tablet .. 650 milliGRAM(s) Oral every 6 hours PRN    docusate sodium 100 milliGRAM(s) Oral two times a day PRN  pantoprazole    Tablet 40 milliGRAM(s) Oral before breakfast  senna 2 Tablet(s) Oral at bedtime PRN    dextrose 40% Gel 15 Gram(s) Oral once PRN  dextrose 50% Injectable 12.5 Gram(s) IV Push once  glucagon  Injectable 1 milliGRAM(s) IntraMuscular once PRN  insulin glargine Injectable (LANTUS) 16 Unit(s) SubCutaneous at bedtime  insulin lispro (HumaLOG) corrective regimen sliding scale   SubCutaneous three times a day before meals    aspirin enteric coated 81 milliGRAM(s) Oral daily  calcitriol   Capsule 0.25 MICROGram(s) Oral daily  calcium carbonate   1250 mG (OsCal) 1 Tablet(s) Oral three times a day  chlorhexidine 2% Cloths 1 Application(s) Topical <User Schedule>  dextrose 5%. 1000 milliLiter(s) IV Continuous <Continuous>  epoetin gian Injectable 58639 Unit(s) IV Push <User Schedule>  heparin  Injectable 5000 Unit(s) SubCutaneous every 8 hours  influenza   Vaccine 0.5 milliLiter(s) IntraMuscular once    	  TELEMETRY:   Vitals:  T(C): 36.9 (09-21-18 @ 05:53), Max: 37.3 (09-20-18 @ 20:05)  HR: 74 (09-21-18 @ 08:42) (74 - 81)  BP: 165/62 (09-21-18 @ 05:53) (135/75 - 166/68)  RR: 18 (09-21-18 @ 05:53) (16 - 18)  SpO2: 96% (09-21-18 @ 08:42) (94% - 99%)  Wt(kg): --  I&O's Summary    20 Sep 2018 07:01  -  21 Sep 2018 07:00  --------------------------------------------------------  IN: 0 mL / OUT: 2650 mL / NET: -2650 mL        Weight (kg): 68.3 (09-20 @ 20:05)  Daily T(C): 36.9 (09-21-18 @ 05:53), Max: 37.3 (09-20-18 @ 20:05)  HR: 74 (09-21-18 @ 08:42) (74 - 81)  BP: 165/62 (09-21-18 @ 05:53) (135/75 - 166/68)  RR: 18 (09-21-18 @ 05:53) (16 - 18)  SpO2: 96% (09-21-18 @ 08:42) (94% - 99%)  Wt(kg): --    Daily   Weight (kg): 68.3 (09-20 @ 20:05)    PHYSICAL EXAM:  Appearance: Normal	  HEENT:   Normal oral mucosa, PERRL, EOMI	  Lymphatic: No lymphadenopathy  Cardiovascular: Normal S1 S2, No JVD, No murmurs, No edema  Respiratory: Lungs clear to auscultation	  Psychiatry: A & O x 3, Mood & affect appropriate  Gastrointestinal:  Soft, Non-tender, + BS	  Skin: No rashes, No ecchymoses, No cyanosis  Neurologic: Non-focal  Extremities: Normal range of motion, No clubbing, cyanosis or edema  Vascular: Peripheral pulses palpable 2+ bilaterally, warm      ECG (tracing reviewed by me):  	    DIAGNOSTIC TESTING:  Echocardiogram (images reviewed by me):  Catheterization:  Stress Test:    CXR (image reviewed by me):  OTHER: 	    LABS:	 	  CARDIAC MARKERS:                                  10.4   6.6   )-----------( 350      ( 21 Sep 2018 06:30 )             35.7     09-21    139  |  97<L>  |  17.0  ----------------------------<  92  3.9   |  32.0<H>  |  2.77<H>    Ca    9.0      21 Sep 2018 06:30  Phos  3.1     09-19  Mg     2.2     09-21    TPro  7.3  /  Alb  3.0<L>  /  TBili  0.6  /  DBili  x   /  AST  44<H>  /  ALT  10  /  AlkPhos  108  09-19    BNP:   Lipid Profile:   HgA1c:   TSH: [facility]                                                        Office: [address] [address]                                                       Telephone: [phone]. Fax:[phone]      CARDIOLOGY PROGRESS NOTE   ([facility])    Subjective: Patient seen and examined.  "Destiny allen".  Spoke with daughter in law Ya who reports that she often catches her eating salty foods at home.  Spoke with patient via . Had HD yesterday.       ROS: No headache, no chest pain, no SOB, no palpitations, no dizziness, no nausea, no bleeding    Chronic Conditions:  ESRD - compliant with HD sessions, had emergent HD   hypertension - mildly elevated     CURRENT MEDICATIONS  carvedilol 12.5 milliGRAM(s) Oral every 12 hours  sacubitril 49 mG/valsartan 51 mG 1 Tablet(s) Oral two times a day      ALBUTerol/ipratropium for Nebulization 3 milliLiter(s) Nebulizer every 6 hours    acetaminophen   Tablet .. 650 milliGRAM(s) Oral every 6 hours PRN    docusate sodium 100 milliGRAM(s) Oral two times a day PRN  pantoprazole    Tablet 40 milliGRAM(s) Oral before breakfast  senna 2 Tablet(s) Oral at bedtime PRN    dextrose 40% Gel 15 Gram(s) Oral once PRN  dextrose 50% Injectable 12.5 Gram(s) IV Push once  glucagon  Injectable 1 milliGRAM(s) IntraMuscular once PRN  insulin glargine Injectable (LANTUS) 16 Unit(s) SubCutaneous at bedtime  insulin lispro (HumaLOG) corrective regimen sliding scale   SubCutaneous three times a day before meals    aspirin enteric coated 81 milliGRAM(s) Oral daily  calcitriol   Capsule 0.25 MICROGram(s) Oral daily  calcium carbonate   1250 mG (OsCal) 1 Tablet(s) Oral three times a day  chlorhexidine 2% Cloths 1 Application(s) Topical <User Schedule>  dextrose 5%. 1000 milliLiter(s) IV Continuous <Continuous>  epoetin gian Injectable 29017 Unit(s) IV Push <User Schedule>  heparin  Injectable 5000 Unit(s) SubCutaneous every 8 hours  influenza   Vaccine 0.5 milliLiter(s) IntraMuscular once    	  TELEMETRY:   Vitals:  T(C): 36.9 (09-21-18 @ 05:53), Max: 37.3 (09-20-18 @ 20:05)  HR: 74 (09-21-18 @ 08:42) (74 - 81)  BP: 165/62 (09-21-18 @ 05:53) (135/75 - 166/68)  RR: 18 (09-21-18 @ 05:53) (16 - 18)  SpO2: 96% (09-21-18 @ 08:42) (94% - 99%)  Wt(kg): --  I&O's Summary    20 Sep 2018 07:01  -  21 Sep 2018 07:00  --------------------------------------------------------  IN: 0 mL / OUT: 2650 mL / NET: -2650 mL    Weight (kg): 68.3 (09-20 @ 20:05)    PHYSICAL EXAM:  Appearance: NAD  HEENT:   Normal oral mucosa, PERRL, EOMI	  Lymphatic: No lymphadenopathy  Cardiovascular: Normal S1 S2, No JVD, No murmurs, No edema, JVP ~6-7 cm  Respiratory: mid to base bilaterally decreased breath sounds  Psychiatry: A & O x 3, Mood & affect appropriate  Gastrointestinal:  Soft, Non-tender, + BS	  Skin: No rashes, No ecchymoses, No cyanosis  Neurologic: Non-focal  Extremities: Normal range of motion, No clubbing, cyanosis or edema  Vascular: Peripheral pulses palpable 2+ bilaterally, warm        < from: TTE Echo Complete w/Doppler (08.31.18 @ 11:22) >  Summary:   1. Left ventricularejection fraction, by visual estimation, is 20 to   25%.   2. Severely decreased global left ventricular systolic function.   3. Spectral Doppler shows pseudonormal pattern of left ventricular   myocardial filling (Grade II diastolic dysfunction).   4. Moderate pleural effusion in the right lateral region.   5. There is no evidence of pericardial effusion.   6. Moderate mitral valve regurgitation.   7. Thickening and calcification of the posterior mitral valve leaflet.   8. Moderate tricuspid regurgitation.   9. Trace pulmonic valve regurgitation.  10. Estimated pulmonary artery systolic pressure is 35.6 mmHg assuming a   right atrial pressure of 15 mmHg, which is consistent with borderline   pulmonary hypertension.    < end of copied text >    < from: Xray Chest 1 View- PORTABLE-Urgent (09.19.18 @ 17:17) >    IMPRESSION:   Gross cardiomegaly. Perihilar pulmonary edema and a RIGHT   greater than LEFT pleural effusions.    < end of copied text >                        10.4   6.6   )-----------( 350      ( 21 Sep 2018 06:30 )             35.7     09-21    139  |  97<L>  |  17.0  ----------------------------<  92  3.9   |  32.0<H>  |  2.77<H>    Ca    9.0      21 Sep 2018 06:30  Phos  3.1     09-19  Mg     2.2     09-21    TPro  7.3  /  Alb  3.0<L>  /  TBili  0.6  /  DBili  x   /  AST  44<H>  /  ALT  10  /  AlkPhos  108  09-19    BNP: Serum Pro-Brain Natriuretic Peptide (09.19.18 @ 16:12)    Serum Pro-Brain Natriuretic Peptide: >61602 pg/mL

## 2018-09-21 NOTE — PROGRESS NOTE ADULT - SUBJECTIVE AND OBJECTIVE BOX
NEPHROLOGY INTERVAL HPI/OVERNIGHT EVENTS:  pt seen earlier==> minimum sob at rest  still needs supplemental O2    MEDICATIONS  (STANDING):  ALBUTerol/ipratropium for Nebulization 3 milliLiter(s) Nebulizer every 6 hours  aspirin enteric coated 81 milliGRAM(s) Oral daily  calcitriol   Capsule 0.25 MICROGram(s) Oral daily  calcium carbonate   1250 mG (OsCal) 1 Tablet(s) Oral three times a day  carvedilol 12.5 milliGRAM(s) Oral every 12 hours  chlorhexidine 2% Cloths 1 Application(s) Topical <User Schedule>  dextrose 5%. 1000 milliLiter(s) (50 mL/Hr) IV Continuous <Continuous>  dextrose 50% Injectable 12.5 Gram(s) IV Push once  epoetin gian Injectable 94991 Unit(s) IV Push <User Schedule>  heparin  Injectable 5000 Unit(s) SubCutaneous every 8 hours  influenza   Vaccine 0.5 milliLiter(s) IntraMuscular once  insulin glargine Injectable (LANTUS) 10 Unit(s) SubCutaneous at bedtime  insulin lispro (HumaLOG) corrective regimen sliding scale   SubCutaneous three times a day before meals  pantoprazole    Tablet 40 milliGRAM(s) Oral before breakfast  sacubitril 97 mG/valsartan 103 mG 1 Tablet(s) Oral two times a day    MEDICATIONS  (PRN):  acetaminophen   Tablet .. 650 milliGRAM(s) Oral every 6 hours PRN Temp greater or equal to 38C (100.4F), Moderate Pain (4 - 6)  dextrose 40% Gel 15 Gram(s) Oral once PRN Blood Glucose LESS THAN 70 milliGRAM(s)/deciliter  docusate sodium 100 milliGRAM(s) Oral two times a day PRN Constipation  glucagon  Injectable 1 milliGRAM(s) IntraMuscular once PRN Glucose LESS THAN 70 milligrams/deciliter  senna 2 Tablet(s) Oral at bedtime PRN Constipation      Allergies    No Known Allergies        Vital Signs Last 24 Hrs  T(C): 36.9 (21 Sep 2018 05:53), Max: 37.3 (20 Sep 2018 20:05)  T(F): 98.5 (21 Sep 2018 05:53), Max: 99.2 (20 Sep 2018 20:05)  HR: 74 (21 Sep 2018 08:42) (74 - 81)  BP: 165/62 (21 Sep 2018 05:53) (135/75 - 166/68)  BP(mean): --  RR: 18 (21 Sep 2018 05:53) (16 - 18)  SpO2: 96% (21 Sep 2018 08:42) (94% - 99%)    PHYSICAL EXAM:  GENERAL: Comfortable   NECK: Supple, No JVD  NERVOUS SYSTEM:  A/O x3, non focal  CHEST: + crackles at bases  HEART:  RRR, No murmur  ABDOMEN: Soft, NT/ND BS+  EXTREMITIES:  No Edema; L AVF + thrill and bruit    LABS:                        10.4   6.6   )-----------( 350      ( 21 Sep 2018 06:30 )             35.7     09-21    139  |  97<L>  |  17.0  ----------------------------<  92  3.9   |  32.0<H>  |  2.77<H>    Ca    9.0      21 Sep 2018 06:30  Phos  3.1     09-19  Mg     2.2     09-21    TPro  7.3  /  Alb  3.0<L>  /  TBili  0.6  /  DBili  x   /  AST  44<H>  /  ALT  10  /  AlkPhos  108  09-19    PT/INR - ( 19 Sep 2018 16:12 )   PT: 12.8 sec;   INR: 1.16 ratio         PTT - ( 19 Sep 2018 16:12 )  PTT:25.5 sec    Magnesium, Serum: 2.2 mg/dL (09-21 @ 06:30)      RADIOLOGY & ADDITIONAL TESTS:  < from: Xray Chest 1 View- PORTABLE-Urgent (09.19.18 @ 17:17) >     EXAM:  XR CHEST PORTABLE URGENT 1V                          PROCEDURE DATE:  09/19/2018          INTERPRETATION:  Portable chest radiograph        CLINICAL INFORMATION:   Short of breath. Pleural effusions. Follow-up.    TECHNIQUE:  Portable  AP view of the chest was obtained.    COMPARISON: No previous examinations are available for review.    FINDINGS:   The lungs  show perihilar opacities/pulmonary edema with large bilateral   RIGHT greater than LEFT pleural effusions . there is gross cardiomegaly.        Visualized osseous structures are intact.        IMPRESSION:   Gross cardiomegaly. Perihilar pulmonary edema and a RIGHT   greater than LEFT pleural effusions.    < end of copied text >

## 2018-09-21 NOTE — PROGRESS NOTE ADULT - ASSESSMENT
CRF(V): fluid overload with pleural effusions  - will plan for extra dialysis today for UF  - regular HD tomorrow  - low salt and fluid diet reinforced    Anemia: MARGI at HD  - follow H/H    RO: low phos diet  - cont Rocaltrol and CaCO3

## 2018-09-21 NOTE — PROGRESS NOTE ADULT - ASSESSMENT
Pt is a 57 yo F with PMHx of PNA with parapneumonic effusion s/p Chest tube on last admission, ESRD on HD, NICM/ CHF with EF 20-25%, nonobstructive CAD, Moderate MR /TR, pericardial effusion s/p pericardiocentesis, PAF not on AC due to GIB hx while on AC, HTN, DM2, AOCD, recurrent hospitalization, presents after recommended to come to hospital by her home nurse due to SOB with some cough. In ED noted to have volume overload, pleural effusion, hyperkalemia. Seen by nephrology in ED and emergent HD done due to volume overload. Nephrology /cardiology on board.    Volume overload 2/2 ESRD and CHF:  - S/p HD last night. Still with SOB.  - Nephrology on board, HD per renal.     - C/w home medicine.  - Cardiology /renal on board.    Pleural Effusion:  - CXR with effusion, no signs of infection. Due to above.  - C/w fluid removal with HD and repeat CXR today to look for improvement. Pt clinically improving, may not not pleural tap at this time.    Acute on chronic combined CHF:  - EF 20-25%- worsened from EF 45-50%  - C/w ASA / Coreg/ Entresto dose increased by card. C/w HD for fluid removal.  - Cardiology following.     >H/o ESRD- Plan as above.  >Chronic troponin elevation- Likely from ESRD, no CP now, Card on board.   >H/o HTN- Better with HD, c/w home meds Entresto / COreg  >H/o PAF- Not on AC due to hx of GIB, C/w Coreg, ASA. Card following.  >H/o DM- BG 80 this am, reduced Lantus 16-->10 units, LSS + FS monitoring.  >H/o AOCD- Stable hgb, C/w epogen with HD.

## 2018-09-21 NOTE — PROGRESS NOTE ADULT - SUBJECTIVE AND OBJECTIVE BOX
JAROCHO MORALES Female 58y [id number]    Patient is a 58y old  Female who presents with a chief complaint of Volume Overload (21 Sep 2018 08:53)      Off service note:  Pt seen and examined at bedside, here for fluid overload from ESRD and CHF, no over night event reported by night staff. Pt states feeling better, SOB improving. Pt is poor historian.     Review of system:  Limited but no fever, chills, nausea, vomiting, headache, dizziness.       PHYSICAL EXAM:    Vital Signs Last 24 Hrs  T(C): 36.9 (21 Sep 2018 05:53), Max: 37.3 (20 Sep 2018 20:05)  T(F): 98.5 (21 Sep 2018 05:53), Max: 99.2 (20 Sep 2018 20:05)  HR: 74 (21 Sep 2018 08:42) (74 - 81)  BP: 165/62 (21 Sep 2018 05:53) (135/75 - 166/68)  BP(mean): --  RR: 18 (21 Sep 2018 05:53) (16 - 18)  SpO2: 96% (21 Sep 2018 08:42) (94% - 99%)    GENERAL: Pt lying comfortably, NAD.  CHEST/LUNG: Bibasilar rales+, No wheezing.  HEART: S1S2+, Regular rate and rhythm; No murmurs.  ABDOMEN: Soft, Nontender, Nondistended; Bowel sounds present.  Extremities: No LE edema, pulses+, LUE AVF with thril  NEURO: AAOX3, no focal deficits, no motor r sensory loss.      MEDICATIONS  (STANDING):  ALBUTerol/ipratropium for Nebulization 3 milliLiter(s) Nebulizer every 6 hours  aspirin enteric coated 81 milliGRAM(s) Oral daily  calcitriol   Capsule 0.25 MICROGram(s) Oral daily  calcium carbonate   1250 mG (OsCal) 1 Tablet(s) Oral three times a day  carvedilol 12.5 milliGRAM(s) Oral every 12 hours  chlorhexidine 2% Cloths 1 Application(s) Topical <User Schedule>  dextrose 5%. 1000 milliLiter(s) (50 mL/Hr) IV Continuous <Continuous>  dextrose 50% Injectable 12.5 Gram(s) IV Push once  epoetin gian Injectable 31061 Unit(s) IV Push <User Schedule>  heparin  Injectable 5000 Unit(s) SubCutaneous every 8 hours  influenza   Vaccine 0.5 milliLiter(s) IntraMuscular once  insulin glargine Injectable (LANTUS) 10 Unit(s) SubCutaneous at bedtime  insulin lispro (HumaLOG) corrective regimen sliding scale   SubCutaneous three times a day before meals  pantoprazole    Tablet 40 milliGRAM(s) Oral before breakfast  sacubitril 97 mG/valsartan 103 mG 1 Tablet(s) Oral two times a day    MEDICATIONS  (PRN):  acetaminophen   Tablet .. 650 milliGRAM(s) Oral every 6 hours PRN Temp greater or equal to 38C (100.4F), Moderate Pain (4 - 6)  dextrose 40% Gel 15 Gram(s) Oral once PRN Blood Glucose LESS THAN 70 milliGRAM(s)/deciliter  docusate sodium 100 milliGRAM(s) Oral two times a day PRN Constipation  glucagon  Injectable 1 milliGRAM(s) IntraMuscular once PRN Glucose LESS THAN 70 milligrams/deciliter  senna 2 Tablet(s) Oral at bedtime PRN Constipation        Labs:  LABS:                        10.4   6.6   )-----------( 350      ( 21 Sep 2018 06:30 )             35.7     09-21    139  |  97<L>  |  17.0  ----------------------------<  92  3.9   |  32.0<H>  |  2.77<H>    Ca    9.0      21 Sep 2018 06:30  Phos  3.1     09-19  Mg     2.2     09-21    TPro  7.3  /  Alb  3.0<L>  /  TBili  0.6  /  DBili  x   /  AST  44<H>  /  ALT  10  /  AlkPhos  108  09-19    PT/INR - ( 19 Sep 2018 16:12 )   PT: 12.8 sec;   INR: 1.16 ratio         PTT - ( 19 Sep 2018 16:12 )  PTT:25.5 sec    LIVER FUNCTIONS - ( 19 Sep 2018 18:35 )  Alb: 3.0 g/dL / Pro: 7.3 g/dL / ALK PHOS: 108 U/L / ALT: 10 U/L / AST: 44 U/L / GGT: x               CARDIAC MARKERS ( 20 Sep 2018 11:44 )  x     / 0.53 ng/mL / x     / x     / x      CARDIAC MARKERS ( 20 Sep 2018 00:49 )  x     / 0.59 ng/mL / x     / x     / x

## 2018-09-22 DIAGNOSIS — K27.9 PEPTIC ULCER, SITE UNSPECIFIED, UNSPECIFIED AS ACUTE OR CHRONIC, WITHOUT HEMORRHAGE OR PERFORATION: ICD-10-CM

## 2018-09-22 LAB
ANION GAP SERPL CALC-SCNC: 15 MMOL/L — SIGNIFICANT CHANGE UP (ref 5–17)
BUN SERPL-MCNC: 29 MG/DL — HIGH (ref 8–20)
CALCIUM SERPL-MCNC: 8.6 MG/DL — SIGNIFICANT CHANGE UP (ref 8.6–10.2)
CHLORIDE SERPL-SCNC: 94 MMOL/L — LOW (ref 98–107)
CO2 SERPL-SCNC: 26 MMOL/L — SIGNIFICANT CHANGE UP (ref 22–29)
CREAT SERPL-MCNC: 3.81 MG/DL — HIGH (ref 0.5–1.3)
GLUCOSE BLDC GLUCOMTR-MCNC: 134 MG/DL — HIGH (ref 70–99)
GLUCOSE BLDC GLUCOMTR-MCNC: 178 MG/DL — HIGH (ref 70–99)
GLUCOSE BLDC GLUCOMTR-MCNC: 229 MG/DL — HIGH (ref 70–99)
GLUCOSE BLDC GLUCOMTR-MCNC: 99 MG/DL — SIGNIFICANT CHANGE UP (ref 70–99)
GLUCOSE SERPL-MCNC: 196 MG/DL — HIGH (ref 70–115)
HBV CORE IGM SER-ACNC: SIGNIFICANT CHANGE UP
HBV SURFACE AB SER-ACNC: SIGNIFICANT CHANGE UP
HBV SURFACE AG SER-ACNC: SIGNIFICANT CHANGE UP
HCT VFR BLD CALC: 33.5 % — LOW (ref 37–47)
HCV AB S/CO SERPL IA: 0.18 S/CO — SIGNIFICANT CHANGE UP
HCV AB SERPL-IMP: SIGNIFICANT CHANGE UP
HGB BLD-MCNC: 9.8 G/DL — LOW (ref 12–16)
MCHC RBC-ENTMCNC: 27 PG — SIGNIFICANT CHANGE UP (ref 27–31)
MCHC RBC-ENTMCNC: 29.3 G/DL — LOW (ref 32–36)
MCV RBC AUTO: 92.3 FL — SIGNIFICANT CHANGE UP (ref 81–99)
PLATELET # BLD AUTO: 343 K/UL — SIGNIFICANT CHANGE UP (ref 150–400)
POTASSIUM SERPL-MCNC: 4.5 MMOL/L — SIGNIFICANT CHANGE UP (ref 3.5–5.3)
POTASSIUM SERPL-SCNC: 4.5 MMOL/L — SIGNIFICANT CHANGE UP (ref 3.5–5.3)
RBC # BLD: 3.63 M/UL — LOW (ref 4.4–5.2)
RBC # FLD: 16 % — HIGH (ref 11–15.6)
SODIUM SERPL-SCNC: 135 MMOL/L — SIGNIFICANT CHANGE UP (ref 135–145)
WBC # BLD: 5.9 K/UL — SIGNIFICANT CHANGE UP (ref 4.8–10.8)
WBC # FLD AUTO: 5.9 K/UL — SIGNIFICANT CHANGE UP (ref 4.8–10.8)

## 2018-09-22 PROCEDURE — 99233 SBSQ HOSP IP/OBS HIGH 50: CPT

## 2018-09-22 PROCEDURE — 99232 SBSQ HOSP IP/OBS MODERATE 35: CPT

## 2018-09-22 RX ORDER — ERYTHROPOIETIN 10000 [IU]/ML
10000 INJECTION, SOLUTION INTRAVENOUS; SUBCUTANEOUS ONCE
Qty: 0 | Refills: 0 | Status: COMPLETED | OUTPATIENT
Start: 2018-09-22 | End: 2018-09-22

## 2018-09-22 RX ADMIN — HEPARIN SODIUM 5000 UNIT(S): 5000 INJECTION INTRAVENOUS; SUBCUTANEOUS at 05:38

## 2018-09-22 RX ADMIN — Medication 3 MILLILITER(S): at 08:40

## 2018-09-22 RX ADMIN — Medication 3 MILLILITER(S): at 02:45

## 2018-09-22 RX ADMIN — Medication 1 TABLET(S): at 13:00

## 2018-09-22 RX ADMIN — CHLORHEXIDINE GLUCONATE 1 APPLICATION(S): 213 SOLUTION TOPICAL at 12:58

## 2018-09-22 RX ADMIN — SACUBITRIL AND VALSARTAN 1 TABLET(S): 24; 26 TABLET, FILM COATED ORAL at 05:38

## 2018-09-22 RX ADMIN — PANTOPRAZOLE SODIUM 40 MILLIGRAM(S): 20 TABLET, DELAYED RELEASE ORAL at 05:38

## 2018-09-22 RX ADMIN — HEPARIN SODIUM 5000 UNIT(S): 5000 INJECTION INTRAVENOUS; SUBCUTANEOUS at 13:00

## 2018-09-22 RX ADMIN — Medication 1 TABLET(S): at 22:06

## 2018-09-22 RX ADMIN — ERYTHROPOIETIN 10000 UNIT(S): 10000 INJECTION, SOLUTION INTRAVENOUS; SUBCUTANEOUS at 10:49

## 2018-09-22 RX ADMIN — Medication 81 MILLIGRAM(S): at 12:58

## 2018-09-22 RX ADMIN — Medication 1 TABLET(S): at 05:38

## 2018-09-22 RX ADMIN — SACUBITRIL AND VALSARTAN 1 TABLET(S): 24; 26 TABLET, FILM COATED ORAL at 17:30

## 2018-09-22 RX ADMIN — MUPIROCIN 1 APPLICATION(S): 20 OINTMENT TOPICAL at 05:38

## 2018-09-22 RX ADMIN — CARVEDILOL PHOSPHATE 12.5 MILLIGRAM(S): 80 CAPSULE, EXTENDED RELEASE ORAL at 05:38

## 2018-09-22 RX ADMIN — Medication 1: at 17:29

## 2018-09-22 RX ADMIN — INSULIN GLARGINE 10 UNIT(S): 100 INJECTION, SOLUTION SUBCUTANEOUS at 21:24

## 2018-09-22 RX ADMIN — CALCITRIOL 0.25 MICROGRAM(S): 0.5 CAPSULE ORAL at 12:58

## 2018-09-22 RX ADMIN — Medication 3 MILLILITER(S): at 16:00

## 2018-09-22 RX ADMIN — CARVEDILOL PHOSPHATE 12.5 MILLIGRAM(S): 80 CAPSULE, EXTENDED RELEASE ORAL at 17:30

## 2018-09-22 RX ADMIN — HEPARIN SODIUM 5000 UNIT(S): 5000 INJECTION INTRAVENOUS; SUBCUTANEOUS at 21:24

## 2018-09-22 RX ADMIN — Medication 3 MILLILITER(S): at 20:35

## 2018-09-22 NOTE — PROGRESS NOTE ADULT - SUBJECTIVE AND OBJECTIVE BOX
NEPHROLOGY INTERVAL HPI/OVERNIGHT EVENTS:  pt doing better overall  tolerated extra HD session yesterday  minimal sob at rest==> still requiring supplemental O2    MEDICATIONS  (STANDING):  ALBUTerol/ipratropium for Nebulization 3 milliLiter(s) Nebulizer every 6 hours  aspirin enteric coated 81 milliGRAM(s) Oral daily  calcitriol   Capsule 0.25 MICROGram(s) Oral daily  calcium carbonate   1250 mG (OsCal) 1 Tablet(s) Oral three times a day  carvedilol 12.5 milliGRAM(s) Oral every 12 hours  chlorhexidine 2% Cloths 1 Application(s) Topical <User Schedule>  dextrose 5%. 1000 milliLiter(s) (50 mL/Hr) IV Continuous <Continuous>  dextrose 50% Injectable 12.5 Gram(s) IV Push once  epoetin gian Injectable 11713 Unit(s) IV Push <User Schedule>  heparin  Injectable 5000 Unit(s) SubCutaneous every 8 hours  influenza   Vaccine 0.5 milliLiter(s) IntraMuscular once  insulin glargine Injectable (LANTUS) 10 Unit(s) SubCutaneous at bedtime  insulin lispro (HumaLOG) corrective regimen sliding scale   SubCutaneous three times a day before meals  mupirocin 2% Nasal 1 Application(s) Nasal two times a day  pantoprazole    Tablet 40 milliGRAM(s) Oral before breakfast  sacubitril 97 mG/valsartan 103 mG 1 Tablet(s) Oral two times a day    MEDICATIONS  (PRN):  acetaminophen   Tablet .. 650 milliGRAM(s) Oral every 6 hours PRN Temp greater or equal to 38C (100.4F), Moderate Pain (4 - 6)  dextrose 40% Gel 15 Gram(s) Oral once PRN Blood Glucose LESS THAN 70 milliGRAM(s)/deciliter  docusate sodium 100 milliGRAM(s) Oral two times a day PRN Constipation  glucagon  Injectable 1 milliGRAM(s) IntraMuscular once PRN Glucose LESS THAN 70 milligrams/deciliter  senna 2 Tablet(s) Oral at bedtime PRN Constipation      Allergies    No Known Allergies          Vital Signs Last 24 Hrs  T(C): 37.1 (22 Sep 2018 09:04), Max: 37.6 (22 Sep 2018 05:34)  T(F): 98.8 (22 Sep 2018 09:04), Max: 99.6 (22 Sep 2018 05:34)  HR: 82 (22 Sep 2018 09:04) (72 - 85)  BP: 146/73 (22 Sep 2018 09:04) (107/67 - 169/71)  BP(mean): --  RR: 18 (22 Sep 2018 09:04) (18 - 18)  SpO2: 93% (22 Sep 2018 09:04) (93% - 97%)    PHYSICAL EXAM:  GENERAL: NAD  HEENT: O2 by NC   NECK: Supple, No JVD  NERVOUS SYSTEM:  A/O x3, non focal  CHEST: + crackles at bases  HEART:  RRR, No murmur  ABDOMEN: Soft, NT/ND BS+  EXTREMITIES:  No Edema; L AVF + thrill and bruit    LABS:                        9.8    5.9   )-----------( 343      ( 22 Sep 2018 05:53 )             33.5     09-22    135  |  94<L>  |  29.0<H>  ----------------------------<  196<H>  4.5   |  26.0  |  3.81<H>    Ca    8.6      22 Sep 2018 05:53  Phos  2.7     09-21  Mg     2.2     09-21          Phosphorus Level, Serum: 2.7 mg/dL (09-21 @ 18:36)      RADIOLOGY & ADDITIONAL TESTS:  < from: Xray Chest 1 View- PORTABLE-Urgent (09.21.18 @ 11:04) >   EXAM:  XR CHEST PORTABLE URGENT 1V                          PROCEDURE DATE:  09/21/2018          INTERPRETATION:    CLINICAL INFORMATION:   Short of breath. Pleural effusions/CHF, follow-up   exam.    TECHNIQUE:  Portable  AP view of the chest compared to prior study of   9/19/2018.    FINDINGS/  IMPRESSION:  There are diffuse bilateral airspace and interstitial edema with moderate   bilateral right greater than left pleural effusions and cardiomegaly   unchanged as compared to previous exam.    < end of copied text >

## 2018-09-22 NOTE — PROGRESS NOTE ADULT - SUBJECTIVE AND OBJECTIVE BOX
[place] [facility]  [facility]  [address] [place] [address]  P   F     REASON FOR VISIT:  Follow up on chf  UPDATE:  s/p dialysis today    MEDICATIONS  (STANDING):  aspirin enteric coated 81 milliGRAM(s) Oral daily  ALBUTerol/ipratropium for Nebulization 3 milliLiter(s) Nebulizer every 6 hours  carvedilol 12.5 milliGRAM(s) Oral every 12 hours  sacubitril 97 mG/valsartan 103 mG 1 Tablet(s) Oral two times a day      calcitriol   Capsule 0.25 MICROGram(s) Oral daily  calcium carbonate   1250 mG (OsCal) 1 Tablet(s) Oral three times a day    epoetin gian Injectable 03065 Unit(s) IV Push <User Schedule>  heparin  Injectable 5000 Unit(s) SubCutaneous every 8 hours  influenza   Vaccine 0.5 milliLiter(s) IntraMuscular once  insulin glargine Injectable (LANTUS) 10 Unit(s) SubCutaneous at bedtime  insulin lispro (HumaLOG) corrective regimen sliding scale   SubCutaneous three times a day before meals  mupirocin 2% Nasal 1 Application(s) Nasal two times a day  pantoprazole    Tablet 40 milliGRAM(s) Oral before breakfast    MEDICATIONS  (PRN):  acetaminophen   Tablet .. 650 milliGRAM(s) Oral every 6 hours PRN Temp greater or equal to 38C (100.4F), Moderate Pain (4 - 6)  dextrose 40% Gel 15 Gram(s) Oral once PRN Blood Glucose LESS THAN 70 milliGRAM(s)/deciliter  docusate sodium 100 milliGRAM(s) Oral two times a day PRN Constipation  glucagon  Injectable 1 milliGRAM(s) IntraMuscular once PRN Glucose LESS THAN 70 milligrams/deciliter  senna 2 Tablet(s) Oral at bedtime PRN Constipation    Vital Signs Last 24 Hrs  T(C): 37.1 (22 Sep 2018 09:04), Max: 37.6 (22 Sep 2018 05:34)  T(F): 98.8 (22 Sep 2018 09:04), Max: 99.6 (22 Sep 2018 05:34)  HR: 82 (22 Sep 2018 09:04) (72 - 85)  BP: 146/73 (22 Sep 2018 09:04) (107/67 - 169/71)  BP(mean): --  RR: 18 (22 Sep 2018 09:04) (18 - 18)  SpO2: 93% (22 Sep 2018 09:04) (93% - 97%)    PHYSICAL EXAM:  Heent Darcy eomi  Lungs decreased at bases  Heart s1&s2 regular  Abd soft non tender  Ext decreased edema  Neuro alert leon freely      LABS:  CBC Full  -  ( 22 Sep 2018 05:53 )  WBC Count : 5.9 K/uL  Hemoglobin : 9.8 g/dL  Hematocrit : 33.5 %  Platelet Count - Automated : 343 K/uL  Mean Cell Volume : 92.3 fl  Mean Cell Hemoglobin : 27.0 pg  Mean Cell Hemoglobin Concentration : 29.3 g/dL    09-22    135  |  94<L>  |  29.0<H>  ----------------------------<  196<H>  4.5   |  26.0  |  3.81<H>    Ca    8.6      22 Sep 2018 05:53  Phos  2.7     09-21  Mg     2.2     09-21    Summary:   1. Severely decreased global left ventricular systolic function. Left   ventricular ejection fraction, by visual estimation, is 20 to 25%.   2. Mildly reduced RV systolic function.   3. Mild to moderate functional mitral regurgitation.   4. Moderate tricuspid regurgitation.   5. Trace pericardial effusion.   6. Large pleural effusion in both left and right lateral regions.   7. ** Compared to TTE dated 8/31/18, biventricular systolic function   unchanged. [place] [facility]  [facility]  [address] [place] [address]  P   F     REASON FOR VISIT:  Follow up on chf  UPDATE:  s/p dialysis today    MEDICATIONS  (STANDING):  aspirin enteric coated 81 milliGRAM(s) Oral daily  ALBUTerol/ipratropium for Nebulization 3 milliLiter(s) Nebulizer every 6 hours  carvedilol 12.5 milliGRAM(s) Oral every 12 hours  sacubitril 97 mG/valsartan 103 mG 1 Tablet(s) Oral two times a day  calcitriol   Capsule 0.25 MICROGram(s) Oral daily  calcium carbonate   1250 mG (OsCal) 1 Tablet(s) Oral three times a day  epoetin gian Injectable 43623 Unit(s) IV Push <User Schedule>  heparin  Injectable 5000 Unit(s) SubCutaneous every 8 hours  influenza   Vaccine 0.5 milliLiter(s) IntraMuscular once  insulin glargine Injectable (LANTUS) 10 Unit(s) SubCutaneous at bedtime  insulin lispro (HumaLOG) corrective regimen sliding scale   SubCutaneous three times a day before meals  mupirocin 2% Nasal 1 Application(s) Nasal two times a day  pantoprazole    Tablet 40 milliGRAM(s) Oral before breakfast    MEDICATIONS  (PRN):  acetaminophen   Tablet .. 650 milliGRAM(s) Oral every 6 hours PRN Temp greater or equal to 38C (100.4F), Moderate Pain (4 - 6)  dextrose 40% Gel 15 Gram(s) Oral once PRN Blood Glucose LESS THAN 70 milliGRAM(s)/deciliter  docusate sodium 100 milliGRAM(s) Oral two times a day PRN Constipation  glucagon  Injectable 1 milliGRAM(s) IntraMuscular once PRN Glucose LESS THAN 70 milligrams/deciliter  senna 2 Tablet(s) Oral at bedtime PRN Constipation    Vital Signs Last 24 Hrs  T(C): 37.1 (22 Sep 2018 09:04), Max: 37.6 (22 Sep 2018 05:34)  T(F): 98.8 (22 Sep 2018 09:04), Max: 99.6 (22 Sep 2018 05:34)  HR: 82 (22 Sep 2018 09:04) (72 - 85)  BP: 146/73 (22 Sep 2018 09:04) (107/67 - 169/71)  BP(mean): --  RR: 18 (22 Sep 2018 09:04) (18 - 18)  SpO2: 93% (22 Sep 2018 09:04) (93% - 97%)    PHYSICAL EXAM:  Heent Darcy eomi  Lungs decreased at bases  Heart s1&s2 regular  Abd soft non tender  Ext decreased edema  Neuro alert leon freely    LABS:  CBC Full  -  ( 22 Sep 2018 05:53 )  WBC Count : 5.9 K/uL  Hemoglobin : 9.8 g/dL  Hematocrit : 33.5 %  Platelet Count - Automated : 343 K/uL  Mean Cell Volume : 92.3 fl  Mean Cell Hemoglobin : 27.0 pg  Mean Cell Hemoglobin Concentration : 29.3 g/dL    09-22    135  |  94<L>  |  29.0<H>  ----------------------------<  196<H>  4.5   |  26.0  |  3.81<H>    Ca    8.6      22 Sep 2018 05:53  Phos  2.7     09-21  Mg     2.2     09-21    Summary:   1. Severely decreased global left ventricular systolic function. Left   ventricular ejection fraction, by visual estimation, is 20 to 25%.   2. Mildly reduced RV systolic function.   3. Mild to moderate functional mitral regurgitation.   4. Moderate tricuspid regurgitation.   5. Trace pericardial effusion.   6. Large pleural effusion in both left and right lateral regions.   7. ** Compared to TTE dated 8/31/18, biventricular systolic function   unchanged.

## 2018-09-22 NOTE — PROGRESS NOTE ADULT - ASSESSMENT
Pt is a 58 year old female with PMHx of PNA with parapneumonic effusion s/p Chest tube on last admission, ESRD on HD, NICM/ CHF with EF 20-25%, nonobstructive CAD, Moderate MR /TR, pericardial effusion s/p pericardiocentesis, PAF not on AC due to GIB hx while on AC, HTN, DM2, AOCD, recurrent hospitalization, presents after recommended to come to hospital by her home nurse due to SOB with some cough. In ED noted to have volume overload, pleural effusion, hyperkalemia. Seen by nephrology in ED and emergent HD done due to volume overload. Nephrology /cardiology on board.    Volume overload 2/2 ESRD and CHF:  - S/p HD last night. Still with SOB.  - Nephrology on board, HD per renal.     - C/w home medicine.  - Cardiology /renal on board.    Pleural Effusion:  - CXR with effusion, no signs of infection. Due to above.  - C/w fluid removal with HD and repeat CXR today to look for improvement. Pt clinically improving, may not not pleural tap at this time.    Acute on chronic combined CHF:  - EF 20-25%- worsened from EF 45-50%  - C/w ASA / Coreg/ Entresto dose increased by card. C/w HD for fluid removal.  - Cardiology following.     >H/o ESRD- Plan as above.  >Chronic troponin elevation- Likely from ESRD, no CP now, Card on board.   >H/o HTN- Better with HD, c/w home meds Entresto / COreg  >H/o PAF- Not on AC due to hx of GIB, C/w Coreg, ASA. Card following.  >H/o DM- BG 80 this am, reduced Lantus 16-->10 units, LSS + FS monitoring.  >H/o AOCD- Stable hgb, C/w epogen with HD.

## 2018-09-22 NOTE — PROGRESS NOTE ADULT - ASSESSMENT
CRF(V): fluid overload with pleural effusions  - HD again today with aggressive UF as tolerates  - low salt and fluid diet reinforced    Anemia: MARGI at HD  - follow H/H    RO: low phos diet  - cont Rocaltrol and CaCO3

## 2018-09-22 NOTE — PROGRESS NOTE ADULT - ASSESSMENT
58F hx DVT, s/p IVC filter, NICM (Cath 3/2018),  HFrEF with worsened EF Echo 8/31/2018: EF 20-25%, moderate diastolic dysfunction, moderate MR, moderate TR, pericardial effusion s/p pericardiocentesis, pleural effusion s/p thoracentesis, paroxysmal atrial fibrillation not on anticoagulation due to major GIB on AC, ESRD on HD, anemia of chronic disease presented with decompensated systolic heart failure, volume overload.  Multiple re-admissions for heart failure which are likely due to dietary non-compliance.

## 2018-09-22 NOTE — PROGRESS NOTE ADULT - SUBJECTIVE AND OBJECTIVE BOX
JARO[facility] MORALES     Chief Complaint: Patient is a 58y old  Female who presents with a chief complaint of Volume Overload (22 Sep 2018 12:16)      PAST MEDICAL & SURGICAL HISTORY:  Pleural effusion  Pericardial effusion  End stage renal disease on dialysis  HLD (hyperlipidemia)  HTN (hypertension)  DM (diabetes mellitus)  A-V fistula  Encounter for dialysis catheter care      HPI/OVERNIGHT EVENTS: Patient in no distress. She has not ambulated, pt consulted.    MEDICATIONS  (STANDING):  ALBUTerol/ipratropium for Nebulization 3 milliLiter(s) Nebulizer every 6 hours  aspirin enteric coated 81 milliGRAM(s) Oral daily  calcitriol   Capsule 0.25 MICROGram(s) Oral daily  calcium carbonate   1250 mG (OsCal) 1 Tablet(s) Oral three times a day  carvedilol 12.5 milliGRAM(s) Oral every 12 hours  chlorhexidine 2% Cloths 1 Application(s) Topical <User Schedule>  dextrose 5%. 1000 milliLiter(s) (50 mL/Hr) IV Continuous <Continuous>  dextrose 50% Injectable 12.5 Gram(s) IV Push once  epoetin gian Injectable 07184 Unit(s) IV Push <User Schedule>  heparin  Injectable 5000 Unit(s) SubCutaneous every 8 hours  influenza   Vaccine 0.5 milliLiter(s) IntraMuscular once  insulin glargine Injectable (LANTUS) 10 Unit(s) SubCutaneous at bedtime  insulin lispro (HumaLOG) corrective regimen sliding scale   SubCutaneous three times a day before meals  mupirocin 2% Nasal 1 Application(s) Nasal two times a day  pantoprazole    Tablet 40 milliGRAM(s) Oral before breakfast  sacubitril 97 mG/valsartan 103 mG 1 Tablet(s) Oral two times a day      Vital Signs Last 24 Hrs  T(C): 37.2 (22 Sep 2018 12:25), Max: 37.6 (22 Sep 2018 05:34)  T(F): 98.9 (22 Sep 2018 12:25), Max: 99.6 (22 Sep 2018 05:34)  HR: 78 (22 Sep 2018 12:25) (72 - 85)  BP: 139/78 (22 Sep 2018 12:25) (107/67 - 169/71)  BP(mean): --  RR: 18 (22 Sep 2018 12:25) (18 - 18)  SpO2: 97% (22 Sep 2018 12:25) (93% - 97%)    PHYSICAL EXAM:  Constitutional:  Patient appears older than stated age.  HEENT: PERRLA, EOMI, Normal Hearing, MMM  Neck: No LAD, No JVD  Back: Normal spine flexure, No CVA tenderness  Respiratory: CTAB Cardiovascular: S1 and S2, RRR, no M/G/R  Gastrointestinal: BS+, soft, NT/ND  Extremities: No peripheral edema  Vascular: 2+ peripheral pulses  Neurological: A/O x 3,     CAPILLARY BLOOD GLUCOSE    LABS:                        9.8    5.9   )-----------( 343      ( 22 Sep 2018 05:53 )             33.5     09-22    135  |  94<L>  |  29.0<H>  ----------------------------<  196<H>  4.5   |  26.0  |  3.81<H>    Ca    8.6      22 Sep 2018 05:53  Phos  2.7     09-21  Mg     2.2     09-21            RADIOLOGY & ADDITIONAL TESTS:

## 2018-09-23 LAB
ANION GAP SERPL CALC-SCNC: 12 MMOL/L — SIGNIFICANT CHANGE UP (ref 5–17)
BUN SERPL-MCNC: 26 MG/DL — HIGH (ref 8–20)
CALCIUM SERPL-MCNC: 8.8 MG/DL — SIGNIFICANT CHANGE UP (ref 8.6–10.2)
CHLORIDE SERPL-SCNC: 95 MMOL/L — LOW (ref 98–107)
CO2 SERPL-SCNC: 27 MMOL/L — SIGNIFICANT CHANGE UP (ref 22–29)
CREAT SERPL-MCNC: 3.78 MG/DL — HIGH (ref 0.5–1.3)
GLUCOSE BLDC GLUCOMTR-MCNC: 139 MG/DL — HIGH (ref 70–99)
GLUCOSE BLDC GLUCOMTR-MCNC: 155 MG/DL — HIGH (ref 70–99)
GLUCOSE BLDC GLUCOMTR-MCNC: 237 MG/DL — HIGH (ref 70–99)
GLUCOSE BLDC GLUCOMTR-MCNC: 268 MG/DL — HIGH (ref 70–99)
GLUCOSE BLDC GLUCOMTR-MCNC: 68 MG/DL — LOW (ref 70–99)
GLUCOSE BLDC GLUCOMTR-MCNC: 93 MG/DL — SIGNIFICANT CHANGE UP (ref 70–99)
GLUCOSE SERPL-MCNC: 64 MG/DL — LOW (ref 70–115)
HCG SERPL-ACNC: <5 MIU/ML — SIGNIFICANT CHANGE UP
HCT VFR BLD CALC: 30.8 % — LOW (ref 37–47)
HGB BLD-MCNC: 9.1 G/DL — LOW (ref 12–16)
MAGNESIUM SERPL-MCNC: 1.9 MG/DL — SIGNIFICANT CHANGE UP (ref 1.8–2.6)
MCHC RBC-ENTMCNC: 27.3 PG — SIGNIFICANT CHANGE UP (ref 27–31)
MCHC RBC-ENTMCNC: 29.5 G/DL — LOW (ref 32–36)
MCV RBC AUTO: 92.5 FL — SIGNIFICANT CHANGE UP (ref 81–99)
PHOSPHATE SERPL-MCNC: 2.4 MG/DL — SIGNIFICANT CHANGE UP (ref 2.4–4.7)
PLATELET # BLD AUTO: 358 K/UL — SIGNIFICANT CHANGE UP (ref 150–400)
POTASSIUM SERPL-MCNC: 4.6 MMOL/L — SIGNIFICANT CHANGE UP (ref 3.5–5.3)
POTASSIUM SERPL-SCNC: 4.6 MMOL/L — SIGNIFICANT CHANGE UP (ref 3.5–5.3)
RBC # BLD: 3.33 M/UL — LOW (ref 4.4–5.2)
RBC # FLD: 16 % — HIGH (ref 11–15.6)
SODIUM SERPL-SCNC: 134 MMOL/L — LOW (ref 135–145)
WBC # BLD: 6.2 K/UL — SIGNIFICANT CHANGE UP (ref 4.8–10.8)
WBC # FLD AUTO: 6.2 K/UL — SIGNIFICANT CHANGE UP (ref 4.8–10.8)

## 2018-09-23 PROCEDURE — 99233 SBSQ HOSP IP/OBS HIGH 50: CPT

## 2018-09-23 PROCEDURE — 99232 SBSQ HOSP IP/OBS MODERATE 35: CPT

## 2018-09-23 RX ORDER — DEXTROSE 50 % IN WATER 50 %
15 SYRINGE (ML) INTRAVENOUS ONCE
Qty: 0 | Refills: 0 | Status: COMPLETED | OUTPATIENT
Start: 2018-09-23 | End: 2018-09-23

## 2018-09-23 RX ORDER — SODIUM CHLORIDE 9 MG/ML
1000 INJECTION, SOLUTION INTRAVENOUS
Qty: 0 | Refills: 0 | Status: DISCONTINUED | OUTPATIENT
Start: 2018-09-23 | End: 2018-09-26

## 2018-09-23 RX ADMIN — Medication 1: at 08:53

## 2018-09-23 RX ADMIN — HEPARIN SODIUM 5000 UNIT(S): 5000 INJECTION INTRAVENOUS; SUBCUTANEOUS at 21:24

## 2018-09-23 RX ADMIN — CARVEDILOL PHOSPHATE 12.5 MILLIGRAM(S): 80 CAPSULE, EXTENDED RELEASE ORAL at 17:22

## 2018-09-23 RX ADMIN — Medication 1 TABLET(S): at 13:33

## 2018-09-23 RX ADMIN — Medication 3: at 17:21

## 2018-09-23 RX ADMIN — HEPARIN SODIUM 5000 UNIT(S): 5000 INJECTION INTRAVENOUS; SUBCUTANEOUS at 13:33

## 2018-09-23 RX ADMIN — SACUBITRIL AND VALSARTAN 1 TABLET(S): 24; 26 TABLET, FILM COATED ORAL at 05:19

## 2018-09-23 RX ADMIN — HEPARIN SODIUM 5000 UNIT(S): 5000 INJECTION INTRAVENOUS; SUBCUTANEOUS at 05:20

## 2018-09-23 RX ADMIN — Medication 3 MILLILITER(S): at 15:57

## 2018-09-23 RX ADMIN — MUPIROCIN 1 APPLICATION(S): 20 OINTMENT TOPICAL at 17:21

## 2018-09-23 RX ADMIN — Medication 1 TABLET(S): at 05:19

## 2018-09-23 RX ADMIN — SACUBITRIL AND VALSARTAN 1 TABLET(S): 24; 26 TABLET, FILM COATED ORAL at 17:22

## 2018-09-23 RX ADMIN — Medication 81 MILLIGRAM(S): at 11:39

## 2018-09-23 RX ADMIN — CALCITRIOL 0.25 MICROGRAM(S): 0.5 CAPSULE ORAL at 11:39

## 2018-09-23 RX ADMIN — Medication 3 MILLILITER(S): at 08:35

## 2018-09-23 RX ADMIN — Medication 3 MILLILITER(S): at 20:30

## 2018-09-23 RX ADMIN — Medication 3 MILLILITER(S): at 02:38

## 2018-09-23 RX ADMIN — CARVEDILOL PHOSPHATE 12.5 MILLIGRAM(S): 80 CAPSULE, EXTENDED RELEASE ORAL at 05:19

## 2018-09-23 RX ADMIN — PANTOPRAZOLE SODIUM 40 MILLIGRAM(S): 20 TABLET, DELAYED RELEASE ORAL at 05:19

## 2018-09-23 RX ADMIN — Medication 15 GRAM(S): at 06:17

## 2018-09-23 RX ADMIN — Medication 1 TABLET(S): at 21:24

## 2018-09-23 NOTE — PROGRESS NOTE ADULT - SUBJECTIVE AND OBJECTIVE BOX
[facility] MORAL[facility]     Chief Complaint: Patient is a 58y old  Female who presents with a chief complaint of Volume Overload (23 Sep 2018 10:28)      PAST MEDICAL & SURGICAL HISTORY:  Pleural effusion  Pericardial effusion  End stage renal disease on dialysis  HLD (hyperlipidemia)  HTN (hypertension)  DM (diabetes mellitus)  A-V fistula  Encounter for dialysis catheter care      HPI/OVERNIGHT EVENTS: Patient doing well but she has difficulty ambulating    MEDICATIONS  (STANDING):  ALBUTerol/ipratropium for Nebulization 3 milliLiter(s) Nebulizer every 6 hours  aspirin enteric coated 81 milliGRAM(s) Oral daily  calcitriol   Capsule 0.25 MICROGram(s) Oral daily  calcium carbonate   1250 mG (OsCal) 1 Tablet(s) Oral three times a day  carvedilol 12.5 milliGRAM(s) Oral every 12 hours  chlorhexidine 2% Cloths 1 Application(s) Topical <User Schedule>  dextrose 5%. 1000 milliLiter(s) (50 mL/Hr) IV Continuous <Continuous>  dextrose 50% Injectable 12.5 Gram(s) IV Push once  epoetin gian Injectable 71003 Unit(s) IV Push <User Schedule>  heparin  Injectable 5000 Unit(s) SubCutaneous every 8 hours  influenza   Vaccine 0.5 milliLiter(s) IntraMuscular once  insulin lispro (HumaLOG) corrective regimen sliding scale   SubCutaneous three times a day before meals  mupirocin 2% Nasal 1 Application(s) Nasal two times a day  pantoprazole    Tablet 40 milliGRAM(s) Oral before breakfast  sacubitril 97 mG/valsartan 103 mG 1 Tablet(s) Oral two times a day      Vital Signs Last 24 Hrs  T(C): 37 (23 Sep 2018 11:40), Max: 37.2 (22 Sep 2018 15:28)  T(F): 98.6 (23 Sep 2018 11:40), Max: 98.9 (22 Sep 2018 15:28)  HR: 82 (23 Sep 2018 11:40) (80 - 88)  BP: 138/65 (23 Sep 2018 11:40) (132/73 - 138/65)  BP(mean): --  RR: 16 (23 Sep 2018 11:40) (16 - 18)  SpO2: 94% (23 Sep 2018 11:40) (90% - 98%)    PHYSICAL EXAM:  Constitutional: NAD, well-groomed, well-developed  HEENT: PERRLA, EOMI, Normal Hearing, MMM  Neck: No LAD, No JVD  Back: Normal spine flexure, No CVA tenderness  Respiratory: CTAB Cardiovascular: S1 and S2, RRR, no M/G/R  Gastrointestinal: BS+, soft, NT/ND  Extremities: No peripheral edema  Vascular: 2+ peripheral pulses     CAPILLARY BLOOD GLUCOSE    LABS:                        9.1    6.2   )-----------( 358      ( 23 Sep 2018 05:48 )             30.8     09-23    134<L>  |  95<L>  |  26.0<H>  ----------------------------<  64<L>  4.6   |  27.0  |  3.78<H>    Ca    8.8      23 Sep 2018 05:47  Phos  2.4     09-23  Mg     1.9     09-23            RADIOLOGY & ADDITIONAL TESTS:

## 2018-09-23 NOTE — PROGRESS NOTE ADULT - PROBLEM SELECTOR PLAN 1
Hx of dietary indiscretion  but compliant with dialysis   Continue with entresto  continue dialysis  weight is down 2 kg  Dietary counseling
Continue with entresto  continue dialysis  Dietary counseling  Device therapy, as per Dr. Salazar.

## 2018-09-23 NOTE — PROGRESS NOTE ADULT - SUBJECTIVE AND OBJECTIVE BOX
NEPHROLOGY INTERVAL HPI/OVERNIGHT EVENTS:  pt resting comfortably when seen earlier  no acute distress  off O2    MEDICATIONS  (STANDING):  ALBUTerol/ipratropium for Nebulization 3 milliLiter(s) Nebulizer every 6 hours  aspirin enteric coated 81 milliGRAM(s) Oral daily  calcitriol   Capsule 0.25 MICROGram(s) Oral daily  calcium carbonate   1250 mG (OsCal) 1 Tablet(s) Oral three times a day  carvedilol 12.5 milliGRAM(s) Oral every 12 hours  chlorhexidine 2% Cloths 1 Application(s) Topical <User Schedule>  dextrose 5%. 1000 milliLiter(s) (50 mL/Hr) IV Continuous <Continuous>  dextrose 50% Injectable 12.5 Gram(s) IV Push once  epoetin gian Injectable 02174 Unit(s) IV Push <User Schedule>  heparin  Injectable 5000 Unit(s) SubCutaneous every 8 hours  influenza   Vaccine 0.5 milliLiter(s) IntraMuscular once  insulin glargine Injectable (LANTUS) 10 Unit(s) SubCutaneous at bedtime  insulin lispro (HumaLOG) corrective regimen sliding scale   SubCutaneous three times a day before meals  mupirocin 2% Nasal 1 Application(s) Nasal two times a day  pantoprazole    Tablet 40 milliGRAM(s) Oral before breakfast  sacubitril 97 mG/valsartan 103 mG 1 Tablet(s) Oral two times a day    MEDICATIONS  (PRN):  acetaminophen   Tablet .. 650 milliGRAM(s) Oral every 6 hours PRN Temp greater or equal to 38C (100.4F), Moderate Pain (4 - 6)  dextrose 40% Gel 15 Gram(s) Oral once PRN Blood Glucose LESS THAN 70 milliGRAM(s)/deciliter  docusate sodium 100 milliGRAM(s) Oral two times a day PRN Constipation  glucagon  Injectable 1 milliGRAM(s) IntraMuscular once PRN Glucose LESS THAN 70 milligrams/deciliter  senna 2 Tablet(s) Oral at bedtime PRN Constipation      Allergies    No Known Allergies    Intolerances            Vital Signs Last 24 Hrs  T(C): 37.2 (23 Sep 2018 05:21), Max: 37.2 (22 Sep 2018 12:25)  T(F): 98.9 (23 Sep 2018 05:21), Max: 98.9 (22 Sep 2018 12:25)  HR: 84 (23 Sep 2018 08:36) (78 - 88)  BP: 132/73 (23 Sep 2018 05:21) (132/73 - 139/78)  BP(mean): --  RR: 16 (23 Sep 2018 05:21) (16 - 18)  SpO2: 90% (23 Sep 2018 08:36) (90% - 98%)    PHYSICAL EXAM:  GENERAL: NAD  NECK: Supple, No JVD  NERVOUS SYSTEM:  A/O x3, non focal  CHEST: + clearing  HEART:  RRR, No rub  ABDOMEN: Soft, NT/ND BS+  EXTREMITIES:  No Edema; L AVF + thrill and bruit    LABS:                        9.1    6.2   )-----------( 358      ( 23 Sep 2018 05:48 )             30.8     09-23    134<L>  |  95<L>  |  26.0<H>  ----------------------------<  64<L>  4.6   |  27.0  |  3.78<H>    Ca    8.8      23 Sep 2018 05:47  Phos  2.4     09-23  Mg     1.9     09-23          Magnesium, Serum: 1.9 mg/dL (09-23 @ 05:47)  Phosphorus Level, Serum: 2.4 mg/dL (09-23 @ 05:47)      RADIOLOGY & ADDITIONAL TESTS:

## 2018-09-23 NOTE — PROGRESS NOTE ADULT - SUBJECTIVE AND OBJECTIVE BOX
CHIEF COMPLAINT:Patient is a 58y old  Female who presents with a chief complaint of Volume Overload (23 Sep 2018 13:32)    INTERVAL HISTORY:  pt with NICM, volume overload on HD  Doing better, no cough, SOB improved.  No n/v/d.  On higher dose of entresto.     Allergies  No Known Allergies  	  MEDICATIONS:  carvedilol 12.5 milliGRAM(s) Oral every 12 hours  sacubitril 97 mG/valsartan 103 mG 1 Tablet(s) Oral two times a day  ALBUTerol/ipratropium for Nebulization 3 milliLiter(s) Nebulizer every 6 hours  acetaminophen   Tablet .. 650 milliGRAM(s) Oral every 6 hours PRN  docusate sodium 100 milliGRAM(s) Oral two times a day PRN  pantoprazole    Tablet 40 milliGRAM(s) Oral before breakfast  senna 2 Tablet(s) Oral at bedtime PRN  dextrose 40% Gel 15 Gram(s) Oral once PRN  dextrose 50% Injectable 12.5 Gram(s) IV Push once  glucagon  Injectable 1 milliGRAM(s) IntraMuscular once PRN  insulin lispro (HumaLOG) corrective regimen sliding scale   SubCutaneous three times a day before meals  aspirin enteric coated 81 milliGRAM(s) Oral daily  calcitriol   Capsule 0.25 MICROGram(s) Oral daily  calcium carbonate   1250 mG (OsCal) 1 Tablet(s) Oral three times a day  chlorhexidine 2% Cloths 1 Application(s) Topical <User Schedule>  dextrose 5%. 1000 milliLiter(s) IV Continuous <Continuous>  epoetin gian Injectable 36910 Unit(s) IV Push <User Schedule>  heparin  Injectable 5000 Unit(s) SubCutaneous every 8 hours  influenza   Vaccine 0.5 milliLiter(s) IntraMuscular once  mupirocin 2% Nasal 1 Application(s) Nasal two times a day    PHYSICAL EXAM:  T(C): 36.8 (09-23-18 @ 15:10), Max: 37.2 (09-23-18 @ 05:21)  HR: 83 (09-23-18 @ 16:07) (80 - 88)  BP: 149/82 (09-23-18 @ 15:10) (132/73 - 149/82)  RR: 17 (09-23-18 @ 15:10) (16 - 18)  SpO2: 90% (09-23-18 @ 16:07) (90% - 98%)  I&O's Summary    22 Sep 2018 07:01  -  23 Sep 2018 07:00  --------------------------------------------------------  IN: 360 mL / OUT: 2500 mL / NET: -2140 mL    23 Sep 2018 07:01  -  23 Sep 2018 16:53  --------------------------------------------------------  IN: 240 mL / OUT: 0 mL / NET: 240 mL    Appearance: Normal	  HEENT:   NC/AT  Eye: Pink Conjunctiva  Lungs: Decrease air entry b/l base.   CVS: IRRR, Normal S1 and S2, No Edema  Pulses: Normal distal pulses.  Neuro: A&O x3    LABS:	 	                       9.1    6.2   )-----------( 358      ( 23 Sep 2018 05:48 )             30.8     09-23    134<L>  |  95<L>  |  26.0<H>  ----------------------------<  64<L>  4.6   |  27.0  |  3.78<H>    Ca    8.8      23 Sep 2018 05:47  Phos  2.4     09-23  Mg     1.9     09-23      proBNP:   Lipid Profile:   HgA1c:   TSH:     ASSESSMENT/PLAN:

## 2018-09-23 NOTE — PROGRESS NOTE ADULT - PROBLEM SELECTOR PLAN 3
continue with asa  no candidate for anticoagulation due to gi bleeding
continue with asa  no candidate for anticoagulation due to gi bleeding

## 2018-09-23 NOTE — PROGRESS NOTE ADULT - ASSESSMENT
CRF(V): fluid overload with pleural effusions==> clinically improved with more aggressive HD/UF  - HD TTS  - will assess tomorrow AM if she needs additional HD  - low salt and fluid diet     Anemia: MARGI at HD  - follow H/H    RO: low phos diet  - cont Rocaltrol and CaCO3  - trend phos

## 2018-09-23 NOTE — DIETITIAN INITIAL EVALUATION ADULT. - OTHER INFO
Pt admitted with PE, fluid overload. Pt reports improved appetite/po intake, completed 100% of lunch today. Nutrition education on renal diet provided.

## 2018-09-24 LAB
ANION GAP SERPL CALC-SCNC: 13 MMOL/L — SIGNIFICANT CHANGE UP (ref 5–17)
BUN SERPL-MCNC: 38 MG/DL — HIGH (ref 8–20)
CALCIUM SERPL-MCNC: 9 MG/DL — SIGNIFICANT CHANGE UP (ref 8.6–10.2)
CHLORIDE SERPL-SCNC: 92 MMOL/L — LOW (ref 98–107)
CO2 SERPL-SCNC: 27 MMOL/L — SIGNIFICANT CHANGE UP (ref 22–29)
CREAT SERPL-MCNC: 4.75 MG/DL — HIGH (ref 0.5–1.3)
CULTURE RESULTS: SIGNIFICANT CHANGE UP
GLUCOSE BLDC GLUCOMTR-MCNC: 187 MG/DL — HIGH (ref 70–99)
GLUCOSE BLDC GLUCOMTR-MCNC: 200 MG/DL — HIGH (ref 70–99)
GLUCOSE BLDC GLUCOMTR-MCNC: 230 MG/DL — HIGH (ref 70–99)
GLUCOSE BLDC GLUCOMTR-MCNC: 244 MG/DL — HIGH (ref 70–99)
GLUCOSE SERPL-MCNC: 195 MG/DL — HIGH (ref 70–115)
HCT VFR BLD CALC: 30 % — LOW (ref 37–47)
HGB BLD-MCNC: 8.8 G/DL — LOW (ref 12–16)
MAGNESIUM SERPL-MCNC: 2 MG/DL — SIGNIFICANT CHANGE UP (ref 1.6–2.6)
MCHC RBC-ENTMCNC: 26.9 PG — LOW (ref 27–31)
MCHC RBC-ENTMCNC: 29.3 G/DL — LOW (ref 32–36)
MCV RBC AUTO: 91.7 FL — SIGNIFICANT CHANGE UP (ref 81–99)
PHOSPHATE SERPL-MCNC: 3.1 MG/DL — SIGNIFICANT CHANGE UP (ref 2.4–4.7)
PLATELET # BLD AUTO: 359 K/UL — SIGNIFICANT CHANGE UP (ref 150–400)
POTASSIUM SERPL-MCNC: 5.1 MMOL/L — SIGNIFICANT CHANGE UP (ref 3.5–5.3)
POTASSIUM SERPL-SCNC: 5.1 MMOL/L — SIGNIFICANT CHANGE UP (ref 3.5–5.3)
RBC # BLD: 3.27 M/UL — LOW (ref 4.4–5.2)
RBC # FLD: 16 % — HIGH (ref 11–15.6)
SODIUM SERPL-SCNC: 132 MMOL/L — LOW (ref 135–145)
SPECIMEN SOURCE: SIGNIFICANT CHANGE UP
WBC # BLD: 6.2 K/UL — SIGNIFICANT CHANGE UP (ref 4.8–10.8)
WBC # FLD AUTO: 6.2 K/UL — SIGNIFICANT CHANGE UP (ref 4.8–10.8)

## 2018-09-24 PROCEDURE — 99232 SBSQ HOSP IP/OBS MODERATE 35: CPT

## 2018-09-24 RX ORDER — ERYTHROPOIETIN 10000 [IU]/ML
10000 INJECTION, SOLUTION INTRAVENOUS; SUBCUTANEOUS
Qty: 0 | Refills: 0 | Status: DISCONTINUED | OUTPATIENT
Start: 2018-09-24 | End: 2018-09-24

## 2018-09-24 RX ORDER — ERYTHROPOIETIN 10000 [IU]/ML
40000 INJECTION, SOLUTION INTRAVENOUS; SUBCUTANEOUS
Qty: 0 | Refills: 0 | Status: DISCONTINUED | OUTPATIENT
Start: 2018-09-24 | End: 2018-09-26

## 2018-09-24 RX ADMIN — Medication 1: at 17:52

## 2018-09-24 RX ADMIN — CARVEDILOL PHOSPHATE 12.5 MILLIGRAM(S): 80 CAPSULE, EXTENDED RELEASE ORAL at 17:52

## 2018-09-24 RX ADMIN — HEPARIN SODIUM 5000 UNIT(S): 5000 INJECTION INTRAVENOUS; SUBCUTANEOUS at 15:14

## 2018-09-24 RX ADMIN — CARVEDILOL PHOSPHATE 12.5 MILLIGRAM(S): 80 CAPSULE, EXTENDED RELEASE ORAL at 05:17

## 2018-09-24 RX ADMIN — Medication 1 TABLET(S): at 05:17

## 2018-09-24 RX ADMIN — MUPIROCIN 1 APPLICATION(S): 20 OINTMENT TOPICAL at 17:52

## 2018-09-24 RX ADMIN — CALCITRIOL 0.25 MICROGRAM(S): 0.5 CAPSULE ORAL at 11:01

## 2018-09-24 RX ADMIN — HEPARIN SODIUM 5000 UNIT(S): 5000 INJECTION INTRAVENOUS; SUBCUTANEOUS at 05:17

## 2018-09-24 RX ADMIN — Medication 1 TABLET(S): at 21:06

## 2018-09-24 RX ADMIN — HEPARIN SODIUM 5000 UNIT(S): 5000 INJECTION INTRAVENOUS; SUBCUTANEOUS at 21:06

## 2018-09-24 RX ADMIN — SACUBITRIL AND VALSARTAN 1 TABLET(S): 24; 26 TABLET, FILM COATED ORAL at 05:17

## 2018-09-24 RX ADMIN — SACUBITRIL AND VALSARTAN 1 TABLET(S): 24; 26 TABLET, FILM COATED ORAL at 17:52

## 2018-09-24 RX ADMIN — Medication 81 MILLIGRAM(S): at 11:01

## 2018-09-24 RX ADMIN — Medication 3 MILLILITER(S): at 20:56

## 2018-09-24 RX ADMIN — Medication 1 TABLET(S): at 12:42

## 2018-09-24 RX ADMIN — PANTOPRAZOLE SODIUM 40 MILLIGRAM(S): 20 TABLET, DELAYED RELEASE ORAL at 05:17

## 2018-09-24 RX ADMIN — CHLORHEXIDINE GLUCONATE 1 APPLICATION(S): 213 SOLUTION TOPICAL at 05:56

## 2018-09-24 RX ADMIN — MUPIROCIN 1 APPLICATION(S): 20 OINTMENT TOPICAL at 05:18

## 2018-09-24 RX ADMIN — Medication 3 MILLILITER(S): at 16:31

## 2018-09-24 RX ADMIN — Medication 650 MILLIGRAM(S): at 06:13

## 2018-09-24 RX ADMIN — Medication 2: at 12:42

## 2018-09-24 NOTE — PHYSICAL THERAPY INITIAL EVALUATION ADULT - CRITERIA FOR SKILLED THERAPEUTIC INTERVENTIONS
risk reduction/prevention/rehab potential/therapy frequency/functional limitations in following categories/anticipated discharge recommendation/predicted duration of therapy intervention/anticipated equipment needs at discharge/impairments found

## 2018-09-24 NOTE — PROGRESS NOTE ADULT - SUBJECTIVE AND OBJECTIVE BOX
NEPHROLOGY INTERVAL HPI/OVERNIGHT EVENTS:  pt seated in bed; comfortable  sob at baseline  no cp, n/v/d    MEDICATIONS  (STANDING):  ALBUTerol/ipratropium for Nebulization 3 milliLiter(s) Nebulizer every 6 hours  aspirin enteric coated 81 milliGRAM(s) Oral daily  calcitriol   Capsule 0.25 MICROGram(s) Oral daily  calcium carbonate   1250 mG (OsCal) 1 Tablet(s) Oral three times a day  carvedilol 12.5 milliGRAM(s) Oral every 12 hours  chlorhexidine 2% Cloths 1 Application(s) Topical <User Schedule>  dextrose 5%. 1000 milliLiter(s) (50 mL/Hr) IV Continuous <Continuous>  dextrose 50% Injectable 12.5 Gram(s) IV Push once  epoetin gian Injectable 95569 Unit(s) IV Push <User Schedule>  heparin  Injectable 5000 Unit(s) SubCutaneous every 8 hours  influenza   Vaccine 0.5 milliLiter(s) IntraMuscular once  insulin lispro (HumaLOG) corrective regimen sliding scale   SubCutaneous three times a day before meals  mupirocin 2% Nasal 1 Application(s) Nasal two times a day  pantoprazole    Tablet 40 milliGRAM(s) Oral before breakfast  sacubitril 97 mG/valsartan 103 mG 1 Tablet(s) Oral two times a day    MEDICATIONS  (PRN):  acetaminophen   Tablet .. 650 milliGRAM(s) Oral every 6 hours PRN Temp greater or equal to 38C (100.4F), Moderate Pain (4 - 6)  dextrose 40% Gel 15 Gram(s) Oral once PRN Blood Glucose LESS THAN 70 milliGRAM(s)/deciliter  docusate sodium 100 milliGRAM(s) Oral two times a day PRN Constipation  glucagon  Injectable 1 milliGRAM(s) IntraMuscular once PRN Glucose LESS THAN 70 milligrams/deciliter  senna 2 Tablet(s) Oral at bedtime PRN Constipation      Allergies    No Known Allergies    Intolerances          Vital Signs Last 24 Hrs  T(C): 36.8 (24 Sep 2018 09:37), Max: 38.2 (24 Sep 2018 06:10)  T(F): 98.3 (24 Sep 2018 09:37), Max: 100.8 (24 Sep 2018 06:10)  HR: 81 (24 Sep 2018 09:37) (80 - 94)  BP: 108/64 (24 Sep 2018 09:37) (108/64 - 149/82)  BP(mean): --  RR: 19 (24 Sep 2018 09:37) (16 - 19)  SpO2: 96% (24 Sep 2018 09:37) (90% - 98%)    PHYSICAL EXAM:  GENERAL: Chronically ill  NECK: Supple, No JVD  NERVOUS SYSTEM:  A/O x3, non focal  CHEST: + crackles; diminished BS at bases  HEART:  RRR, No rub  ABDOMEN: Soft, NT/ND BS+  EXTREMITIES:  No Edema; L AVF + thrill and bruit    LABS:                        8.8    6.2   )-----------( 359      ( 24 Sep 2018 05:53 )             30.0     09-24    132<L>  |  92<L>  |  38.0<H>  ----------------------------<  195<H>  5.1   |  27.0  |  4.75<H>    Ca    9.0      24 Sep 2018 05:53  Phos  3.1     09-24  Mg     2.0     09-24          Magnesium, Serum: 2.0 mg/dL (09-24 @ 05:53)  Phosphorus Level, Serum: 3.1 mg/dL (09-24 @ 05:53)      RADIOLOGY & ADDITIONAL TESTS:

## 2018-09-24 NOTE — PROGRESS NOTE ADULT - ASSESSMENT
58F hx DVT, s/p IVC filter, NICM (Cath 3/2018),  HFrEF with worsened EF Echo 8/31/2018: EF 20-25%, moderate diastolic dysfunction, moderate MR, moderate TR, pericardial effusion s/p pericardiocentesis, pleural effusion s/p thoracentesis, paroxysmal atrial fibrillation not on anticoagulation due to major GIB on AC, ESRD on HD, anemia of chronic disease presented with decompensated systolic heart failure, volume overload.  Multiple re-admissions for heart failure which are likely due to dietary non-compliance.        # NICM HFrEF LVEF 20-25% - presented decompensated, wet and warm, with symptom improvement with HD.  Multiple readmissions for decompensated systolic heart failure.  Spoke with family (daughter in law Bhakti) who reports that she is compliant with medications and hemodialysis sessions at home.  However, does notice that she is not compliant with a low sodium diet.  Now euvolemic.  Continue carvedilol, entresto.  Avoid aldosterone inhibitor due to renal failure.  Will re-evaluate LVEF with echo November 2018 to assess for ICD candidacy.   # paroxysmal atrial fibrillation - failed AC in setting of major GI bleed, currently off AC.  Was previously referred for [facility], missed appointments in the office.  Continue carvedilol.  # pericardial effusion - resolved.   # ESRD - HD as scheduled  # pleural effusions - patient currently feeling better, not much by oxygen requirements, continue to follow, may not need therapeutic thoracentesis at this point    DVT prophylaxis - hep sq  Stable for discharge from a cardiology standpoint  Thank you for allowing me to participate in care of your patient.   Outpatient appointment 10/1/18 3 pm in our office.

## 2018-09-24 NOTE — PROGRESS NOTE ADULT - ASSESSMENT
CRF(V):   CM (EF=25%) with acute and chronic CHF, pleural effusions  - HD tomorrow with UF as tolerates  - may need additional HD this week as well  - low salt and fluid diet     Anemia: MARGI at HD  - follow H/H    RO: low phos diet  - cont Rocaltrol and CaCO3  - phos ok

## 2018-09-24 NOTE — PHYSICAL THERAPY INITIAL EVALUATION ADULT - PERTINENT HX OF CURRENT PROBLEM, REHAB EVAL
Increasing SOB, referred to hospital by home nurse; recently with PNA s/p chest tube, ESRD (see history below)

## 2018-09-24 NOTE — PROGRESS NOTE ADULT - SUBJECTIVE AND OBJECTIVE BOX
JAROCHO MORALES    [id number]    58y      Female    CC: SOB    INTERVAL HPI/OVERNIGHT EVENTS:  Pt is a 58 year old female with PMHx of PNA with parapneumonic effusion s/p Chest tube on last admission, ESRD on HD, NICM/ CHF with EF 20-25%, nonobstructive CAD, Moderate MR /TR, pericardial effusion s/p pericardiocentesis, PAF not on AC due to GIB hx while on AC, HTN, DM2, AOCD, recurrent hospitalization, presents after recommended to come to hospital by her home nurse due to SOB with some cough. In ED noted to have volume overload, pleural effusion, hyperkalemia. Seen by nephrology in ED and emergent HD done due to volume overload. Nephrology /cardiology on board.    today pt denies any sob, no chest pain no n/v does feel light headed when stands up    REVIEW OF SYSTEMS:    CONSTITUTIONAL: No fever, weight loss, or fatigue  RESPIRATORY: No cough, wheezing, hemoptysis; No shortness of breath  CARDIOVASCULAR: No chest pain, palpitations  GASTROINTESTINAL: No abdominal or epigastric pain. No nausea, vomiting      Vital Signs Last 24 Hrs  T(C): 36.8 (24 Sep 2018 09:37), Max: 38.2 (24 Sep 2018 06:10)  T(F): 98.3 (24 Sep 2018 09:37), Max: 100.8 (24 Sep 2018 06:10)  HR: 81 (24 Sep 2018 09:37) (80 - 94)  BP: 108/64 (24 Sep 2018 09:37) (108/64 - 146/78)  BP(mean): --  RR: 19 (24 Sep 2018 09:37) (17 - 19)  SpO2: 96% (24 Sep 2018 09:37) (90% - 98%)    PHYSICAL EXAM:    GENERAL: NAD, well-groomed  HEENT: PERRL, +EOMI  CHEST/LUNG: Clear to auscultation bilaterally; No wheezing  HEART: S1S2+, Regular rate and rhythm; No murmurs  ABDOMEN: Soft, Nontender, Nondistended; Bowel sounds present  EXTREMITIES:  2+ Peripheral Pulses, No clubbing, cyanosis, or edema      LABS:                        8.8    6.2   )-----------( 359      ( 24 Sep 2018 05:53 )             30.0     09-24    132<L>  |  92<L>  |  38.0<H>  ----------------------------<  195<H>  5.1   |  27.0  |  4.75<H>    Ca    9.0      24 Sep 2018 05:53  Phos  3.1     09-24  Mg     2.0     09-24      MEDICATIONS  (STANDING):  ALBUTerol/ipratropium for Nebulization 3 milliLiter(s) Nebulizer every 6 hours  aspirin enteric coated 81 milliGRAM(s) Oral daily  calcitriol   Capsule 0.25 MICROGram(s) Oral daily  calcium carbonate   1250 mG (OsCal) 1 Tablet(s) Oral three times a day  carvedilol 12.5 milliGRAM(s) Oral every 12 hours  chlorhexidine 2% Cloths 1 Application(s) Topical <User Schedule>  dextrose 5%. 1000 milliLiter(s) (50 mL/Hr) IV Continuous <Continuous>  dextrose 50% Injectable 12.5 Gram(s) IV Push once  epoetin gian Injectable 07841 Unit(s) IV Push <User Schedule>  heparin  Injectable 5000 Unit(s) SubCutaneous every 8 hours  influenza   Vaccine 0.5 milliLiter(s) IntraMuscular once  insulin lispro (HumaLOG) corrective regimen sliding scale   SubCutaneous three times a day before meals  mupirocin 2% Nasal 1 Application(s) Nasal two times a day  pantoprazole    Tablet 40 milliGRAM(s) Oral before breakfast  sacubitril 97 mG/valsartan 103 mG 1 Tablet(s) Oral two times a day    MEDICATIONS  (PRN):  acetaminophen   Tablet .. 650 milliGRAM(s) Oral every 6 hours PRN Temp greater or equal to 38C (100.4F), Moderate Pain (4 - 6)  dextrose 40% Gel 15 Gram(s) Oral once PRN Blood Glucose LESS THAN 70 milliGRAM(s)/deciliter  docusate sodium 100 milliGRAM(s) Oral two times a day PRN Constipation  glucagon  Injectable 1 milliGRAM(s) IntraMuscular once PRN Glucose LESS THAN 70 milligrams/deciliter  senna 2 Tablet(s) Oral at bedtime PRN Constipation

## 2018-09-24 NOTE — CONSULT NOTE ADULT - SUBJECTIVE AND OBJECTIVE BOX
JAROCHO MORALES  58y  Female  [id number]      Patient is a 58y old  Female who presents with a chief complaint of Volume Overload (24 Sep 2018 15:41)  Consult for anemia.    HPI:  Pt is a 57 yo F w/ SOB likely 2/2 to volume overload, PMH recent PNA w/ parapneumonic effusion s/p Chest tube, ESRD on HD, NICM, CAD, CHF w/ EF 20-25%, DM2, parox A fib, h/o GI bleed.   Patient presents after recommended to come to hospital by her home nurse due to SOB. Patient has been having SOB beginning today  and continunous non-productive cough since her previous admission, she has not felt fevers, but has felt chills, she completed antibiotics, she does have chest pain only when she coughs and it is not associated w/ exertion.   She does have a mild headache, no change in vision or hearing. Denies abd pain, diarrhea, constipation, melena, hematochezia, dysuria. She produces small amounts of urine daily.     While in ED patient received a dose of Zosyn, was evaluated by nephrology who recommended emergent dialysis. Patient was evaluated by me in Dialysis unit. K 6.1 on labs. Na 132, BNP 70k. (19 Sep 2018 21:23)    Has had anemia of CRF.   No bleeding.    PAST MEDICAL & SURGICAL HISTORY:  Pleural effusion  Pericardial effusion  End stage renal disease on dialysis  HLD (hyperlipidemia)  HTN (hypertension)  DM (diabetes mellitus)  A-V fistula  Encounter for dialysis catheter care    FAMILY HISTORY:  Family history of kidney disease in brother (Sibling)    REVIEW OF SYSTEMS      General: Weakness.  " pressure was high"	    Skin/Breast: Neg.    Ophthalmologic: Neg.  	  ENMT:	Neg.    Respiratory and Thorax: No SOB or CP.  	  Cardiovascular:	No CP, no palpitations    Gastrointestinal:	No N/V/D  No GI bleeding.    Genitourinary: Neg.    Musculoskeletal:	No bone pain  No joint or muscle pain.    Neurological:	   Neg.    Psychiatric: Neg.    Hematology/Lymphatics:	 See HPI.      PHYSICAL EXAM:  Vital Signs Last 24 Hrs  T(C): 36.8 (24 Sep 2018 15:29), Max: 38.2 (24 Sep 2018 06:10)  T(F): 98.2 (24 Sep 2018 15:29), Max: 100.8 (24 Sep 2018 06:10)  HR: 78 (24 Sep 2018 15:45) (77 - 94)  BP: 125/72 (24 Sep 2018 15:29) (108/64 - 129/73)  BP(mean): --  RR: 17 (24 Sep 2018 15:29) (17 - 19)  SpO2: 93% (24 Sep 2018 15:45) (93% - 97%)  Alert and oriented  In NAD  Looks chromically ill.  Mild pallor  No jaundice  No LAD in the neck  Lungs: Clear  Heart: RR  Abd: Non-tender  No HSM.  Exts: No CCE or evidence of DVT on the bedside evaluation.         CBC Full  -  ( 24 Sep 2018 05:53 )  WBC Count : 6.2 K/uL  Hemoglobin : 8.8 g/dL  Hematocrit : 30.0 %  Platelet Count - Automated : 359 K/uL  Mean Cell Volume : 91.7 fl  Mean Cell Hemoglobin : 26.9 pg  Mean Cell Hemoglobin Concentration : 29.3 g/dL  Auto Neutrophil # : x  Auto Lymphocyte # : x  Auto Monocyte # : x  Auto Eosinophil # : x  Auto Basophil # : x  Auto Neutrophil % : x  Auto Lymphocyte % : x  Auto Monocyte % : x  Auto Eosinophil % : x  Auto Basophil % : x      24 Sep 2018 05:53    132    |  92     |  38.0   ----------------------------<  195    5.1     |  27.0   |  4.75     Ca    9.0        24 Sep 2018 05:53  Phos  3.1       24 Sep 2018 05:53  Mg     2.0       24 Sep 2018 05:53      Assessment: Anemia of CRF  BP well controlled    Plan: Procrit 40,000 units weekly.    Thank you.

## 2018-09-24 NOTE — PROGRESS NOTE ADULT - SUBJECTIVE AND OBJECTIVE BOX
[facility]                                                        Office: [address] [place] [address]                                                       Telephone: [phone]. Fax:[phone]      CARDIOLOGY PROGRESS NOTE   ([facility])    Subjective: Patient seen and examined earlier this morning.  Feels well overall.  No events over the weekend.      ROS: No headache, no chest pain, no SOB, no palpitations, no dizziness, no nausea, no bleeding    Chronic Conditions:  ESRD - compliant with HD sessions, no acute issues  hypertension - controlled     CURRENT MEDICATIONS  carvedilol 12.5 milliGRAM(s) Oral every 12 hours  sacubitril 97 mG/valsartan 103 mG 1 Tablet(s) Oral two times a day      ALBUTerol/ipratropium for Nebulization 3 milliLiter(s) Nebulizer every 6 hours    acetaminophen   Tablet .. 650 milliGRAM(s) Oral every 6 hours PRN    docusate sodium 100 milliGRAM(s) Oral two times a day PRN  pantoprazole    Tablet 40 milliGRAM(s) Oral before breakfast  senna 2 Tablet(s) Oral at bedtime PRN    dextrose 40% Gel 15 Gram(s) Oral once PRN  dextrose 50% Injectable 12.5 Gram(s) IV Push once  glucagon  Injectable 1 milliGRAM(s) IntraMuscular once PRN  insulin lispro (HumaLOG) corrective regimen sliding scale   SubCutaneous three times a day before meals    aspirin enteric coated 81 milliGRAM(s) Oral daily  calcitriol   Capsule 0.25 MICROGram(s) Oral daily  calcium carbonate   1250 mG (OsCal) 1 Tablet(s) Oral three times a day  chlorhexidine 2% Cloths 1 Application(s) Topical <User Schedule>  dextrose 5%. 1000 milliLiter(s) IV Continuous <Continuous>  epoetin gian Injectable 10339 Unit(s) IV Push <User Schedule>  heparin  Injectable 5000 Unit(s) SubCutaneous every 8 hours  influenza   Vaccine 0.5 milliLiter(s) IntraMuscular once  mupirocin 2% Nasal 1 Application(s) Nasal two times a day    	  TELEMETRY: n/a  Vitals:  T(C): 36.8 (09-24-18 @ 09:37), Max: 38.2 (09-24-18 @ 06:10)  HR: 81 (09-24-18 @ 09:37) (80 - 94)  BP: 108/64 (09-24-18 @ 09:37) (108/64 - 149/82)  RR: 19 (09-24-18 @ 09:37) (16 - 19)  SpO2: 96% (09-24-18 @ 09:37) (90% - 98%)  Wt(kg): --  I&O's Summary    23 Sep 2018 07:01  -  24 Sep 2018 07:00  --------------------------------------------------------  IN: 440 mL / OUT: 0 mL / NET: 440 mL        Daily T(C): 36.8 (09-24-18 @ 09:37), Max: 38.2 (09-24-18 @ 06:10)  HR: 81 (09-24-18 @ 09:37) (80 - 94)  BP: 108/64 (09-24-18 @ 09:37) (108/64 - 149/82)  RR: 19 (09-24-18 @ 09:37) (16 - 19)  SpO2: 96% (09-24-18 @ 09:37) (90% - 98%)  Wt(kg): --    Daily     PHYSICAL EXAM:  Appearance: Normal, NAD	  HEENT:   Normal oral mucosa, PERRL, EOMI	  Lymphatic: No lymphadenopathy  Cardiovascular: Normal S1 S2, No JVD, III/VI systolic murmur, No edema  Respiratory: Lungs clear to auscultation	  Psychiatry: A & O x 3, Mood & affect appropriate  Gastrointestinal:  Soft, Non-tender, + BS	  Skin: No rashes, No ecchymoses, No cyanosis  Neurologic: Non-focal  Extremities: Normal range of motion, No clubbing, cyanosis or edema  Vascular: Peripheral pulses palpable 2+ bilaterally, warm    DIAGNOSTIC TESTING:  Echocardiogram (images reviewed by me): < from: TTE Echo Limited or F/U (09.21.18 @ 07:43) >  Summary:   1. Severely decreased global left ventricular systolic function. Left  ventricular ejection fraction, by visual estimation, is 20 to 25%.   2. Mildly reduced RV systolic function.   3. Mild to moderate functional mitral regurgitation.   4. Moderate tricuspid regurgitation.   5. Trace pericardial effusion.   6. Large pleural effusion in both left and right lateral regions.   7. ** Compared to TTE dated 8/31/18, biventricular systolic function   unchanged.    < end of copied text >    CXR (image reviewed by me): < from: Xray Chest 1 View- PORTABLE-Urgent (09.21.18 @ 11:04) >  FINDINGS/  IMPRESSION:  There are diffuse bilateral airspace and interstitial edema with moderate   bilateral right greater than left pleural effusions and cardiomegaly   unchanged as compared to previous exam.    < end of copied text >                          8.8    6.2   )-----------( 359      ( 24 Sep 2018 05:53 )             30.0     09-24    132<L>  |  92<L>  |  38.0<H>  ----------------------------<  195<H>  5.1   |  27.0  |  4.75<H>    Ca    9.0      24 Sep 2018 05:53  Phos  3.1     09-24  Mg     2.0     09-24

## 2018-09-24 NOTE — PHYSICAL THERAPY INITIAL EVALUATION ADULT - ADDITIONAL COMMENTS
Pt states she has a RW and cane at home; used AD when outdoors per pt.  Has 3 steps into home, 5 steps within home.  Had a homecare RN who referred pt to ED @ [facility].  Family is supportive, per pt.  Used [employer] throughout evaluation.

## 2018-09-24 NOTE — PROGRESS NOTE ADULT - ASSESSMENT
Pt is a 58 year old female with PMHx of PNA with parapneumonic effusion s/p Chest tube on last admission, ESRD on HD, NICM/ CHF with EF 20-25%, nonobstructive CAD, Moderate MR /TR, pericardial effusion s/p pericardiocentesis, PAF not on AC due to GIB hx while on AC, HTN, DM2, AOCD, recurrent hospitalization, presents after recommended to come to hospital by her home nurse due to SOB with some cough. In ED noted to have volume overload, pleural effusion, hyperkalemia. Seen by nephrology in ED and emergent HD done due to volume overload. Nephrology /cardiology on board.    1) SOB due to Volume overload 2/2 ESRD and CHF: improved  - Renal following and on HD    - C/w entresto and coreg  - Cardiology recs appreciated outpatient follow up with cardio to reassess EF in november     2) Pleural Effusion:  - plan as above    3) Acute on chronic combined CHF:  - EF 20-25%- worsened from EF 45-50%  - C/w ASA / Coreg/ Entresto  - C/w HD for fluid removal.  - Cardiology recs appreciated outpatient follow up with cardio to reassess EF in november     4) H/o ESRD  - c/w HD and renal following    5) HTN  - c/w  Entresto and coreg    6) PAF  - Not on AC due to hx of GIB  - C/w Coreg, ASA    7) DM type 2  - lantus, CSI + FS monitoring    8) Anemia of chronic disease  - stable and monitor   - c/w epogen with HD Pt is a 58 year old female with PMHx of PNA with parapneumonic effusion s/p Chest tube on last admission, ESRD on HD, NICM/ CHF with EF 20-25%, nonobstructive CAD, Moderate MR /TR, pericardial effusion s/p pericardiocentesis, PAF not on AC due to GIB hx while on AC, HTN, DM2, AOCD, recurrent hospitalization, presents after recommended to come to hospital by her home nurse due to SOB with some cough. In ED noted to have volume overload, pleural effusion, hyperkalemia. Seen by nephrology in ED and emergent HD done due to volume overload. Nephrology /cardiology on board.    1) SOB due to Volume overload 2/2 ESRD and CHF: improved  - Renal following and on HD    - C/w entresto and coreg  - Cardiology recs appreciated outpatient follow up with cardio to reassess EF in november     Had mild temp rises 100.8F this morning, other vitals stable, wbc WNL, no sx, will monitor if temp >101F will do work up    2) Pleural Effusion:  - plan as above    3) Acute on chronic combined CHF:  - EF 20-25%- worsened from EF 45-50%  - C/w ASA / Coreg/ Entresto  - C/w HD for fluid removal.  - Cardiology recs appreciated outpatient follow up with cardio to reassess EF in november     4) H/o ESRD  - c/w HD and renal following    5) HTN  - c/w  Entresto and coreg    6) PAF  - Not on AC due to hx of GIB  - C/w Coreg, ASA    7) DM type 2  - lantus, CSI + FS monitoring    8) Anemia of chronic disease  - stable and monitor   - c/w epogen with HD

## 2018-09-25 LAB
ANION GAP SERPL CALC-SCNC: 15 MMOL/L — SIGNIFICANT CHANGE UP (ref 5–17)
BUN SERPL-MCNC: 53 MG/DL — HIGH (ref 8–20)
CALCIUM SERPL-MCNC: 8.7 MG/DL — SIGNIFICANT CHANGE UP (ref 8.6–10.2)
CHLORIDE SERPL-SCNC: 90 MMOL/L — LOW (ref 98–107)
CO2 SERPL-SCNC: 24 MMOL/L — SIGNIFICANT CHANGE UP (ref 22–29)
CREAT SERPL-MCNC: 5.88 MG/DL — HIGH (ref 0.5–1.3)
FERRITIN SERPL-MCNC: 627 NG/ML — HIGH (ref 15–150)
GLUCOSE BLDC GLUCOMTR-MCNC: 169 MG/DL — HIGH (ref 70–99)
GLUCOSE BLDC GLUCOMTR-MCNC: 184 MG/DL — HIGH (ref 70–99)
GLUCOSE BLDC GLUCOMTR-MCNC: 249 MG/DL — HIGH (ref 70–99)
GLUCOSE BLDC GLUCOMTR-MCNC: 310 MG/DL — HIGH (ref 70–99)
GLUCOSE SERPL-MCNC: 242 MG/DL — HIGH (ref 70–115)
HCT VFR BLD CALC: 28.1 % — LOW (ref 37–47)
HGB BLD-MCNC: 8.5 G/DL — LOW (ref 12–16)
IRON SATN MFR SERPL: 12 % — LOW (ref 14–50)
IRON SATN MFR SERPL: 22 UG/DL — LOW (ref 37–145)
MCHC RBC-ENTMCNC: 27.2 PG — SIGNIFICANT CHANGE UP (ref 27–31)
MCHC RBC-ENTMCNC: 30.2 G/DL — LOW (ref 32–36)
MCV RBC AUTO: 89.8 FL — SIGNIFICANT CHANGE UP (ref 81–99)
PHOSPHATE SERPL-MCNC: 3.5 MG/DL — SIGNIFICANT CHANGE UP (ref 2.4–4.7)
PLATELET # BLD AUTO: 432 K/UL — HIGH (ref 150–400)
POTASSIUM SERPL-MCNC: 5.6 MMOL/L — HIGH (ref 3.5–5.3)
POTASSIUM SERPL-SCNC: 5.6 MMOL/L — HIGH (ref 3.5–5.3)
RBC # BLD: 3.13 M/UL — LOW (ref 4.4–5.2)
RBC # FLD: 15.6 % — SIGNIFICANT CHANGE UP (ref 11–15.6)
SODIUM SERPL-SCNC: 129 MMOL/L — LOW (ref 135–145)
TIBC SERPL-MCNC: 179 UG/DL — LOW (ref 220–430)
TRANSFERRIN SERPL-MCNC: 125 MG/DL — LOW (ref 192–382)
WBC # BLD: 6.4 K/UL — SIGNIFICANT CHANGE UP (ref 4.8–10.8)
WBC # FLD AUTO: 6.4 K/UL — SIGNIFICANT CHANGE UP (ref 4.8–10.8)

## 2018-09-25 PROCEDURE — 99232 SBSQ HOSP IP/OBS MODERATE 35: CPT

## 2018-09-25 RX ADMIN — HEPARIN SODIUM 5000 UNIT(S): 5000 INJECTION INTRAVENOUS; SUBCUTANEOUS at 23:08

## 2018-09-25 RX ADMIN — ERYTHROPOIETIN 40000 UNIT(S): 10000 INJECTION, SOLUTION INTRAVENOUS; SUBCUTANEOUS at 12:56

## 2018-09-25 RX ADMIN — CALCITRIOL 0.25 MICROGRAM(S): 0.5 CAPSULE ORAL at 14:32

## 2018-09-25 RX ADMIN — MUPIROCIN 1 APPLICATION(S): 20 OINTMENT TOPICAL at 17:53

## 2018-09-25 RX ADMIN — Medication 3 MILLILITER(S): at 20:33

## 2018-09-25 RX ADMIN — Medication 1: at 12:16

## 2018-09-25 RX ADMIN — Medication 1: at 08:12

## 2018-09-25 RX ADMIN — Medication 3 MILLILITER(S): at 14:51

## 2018-09-25 RX ADMIN — Medication 81 MILLIGRAM(S): at 12:56

## 2018-09-25 RX ADMIN — Medication 4: at 17:29

## 2018-09-25 RX ADMIN — HEPARIN SODIUM 5000 UNIT(S): 5000 INJECTION INTRAVENOUS; SUBCUTANEOUS at 12:58

## 2018-09-25 RX ADMIN — Medication 1 TABLET(S): at 12:57

## 2018-09-25 RX ADMIN — Medication 1 TABLET(S): at 23:08

## 2018-09-25 RX ADMIN — CARVEDILOL PHOSPHATE 12.5 MILLIGRAM(S): 80 CAPSULE, EXTENDED RELEASE ORAL at 17:53

## 2018-09-25 RX ADMIN — SACUBITRIL AND VALSARTAN 1 TABLET(S): 24; 26 TABLET, FILM COATED ORAL at 17:54

## 2018-09-25 NOTE — PROGRESS NOTE ADULT - SUBJECTIVE AND OBJECTIVE BOX
NEPHROLOGY INTERVAL HPI/OVERNIGHT EVENTS:  pt seen at HD and tolerating well  no cp, sob, n/v/d    MEDICATIONS  (STANDING):  ALBUTerol/ipratropium for Nebulization 3 milliLiter(s) Nebulizer every 6 hours  aspirin enteric coated 81 milliGRAM(s) Oral daily  calcitriol   Capsule 0.25 MICROGram(s) Oral daily  calcium carbonate   1250 mG (OsCal) 1 Tablet(s) Oral three times a day  carvedilol 12.5 milliGRAM(s) Oral every 12 hours  chlorhexidine 2% Cloths 1 Application(s) Topical <User Schedule>  dextrose 5%. 1000 milliLiter(s) (50 mL/Hr) IV Continuous <Continuous>  dextrose 50% Injectable 12.5 Gram(s) IV Push once  epoetin gian Injectable 66001 Unit(s) SubCutaneous every 7 days  heparin  Injectable 5000 Unit(s) SubCutaneous every 8 hours  influenza   Vaccine 0.5 milliLiter(s) IntraMuscular once  insulin lispro (HumaLOG) corrective regimen sliding scale   SubCutaneous three times a day before meals  mupirocin 2% Nasal 1 Application(s) Nasal two times a day  pantoprazole    Tablet 40 milliGRAM(s) Oral before breakfast  sacubitril 97 mG/valsartan 103 mG 1 Tablet(s) Oral two times a day    MEDICATIONS  (PRN):  acetaminophen   Tablet .. 650 milliGRAM(s) Oral every 6 hours PRN Temp greater or equal to 38C (100.4F), Moderate Pain (4 - 6)  dextrose 40% Gel 15 Gram(s) Oral once PRN Blood Glucose LESS THAN 70 milliGRAM(s)/deciliter  docusate sodium 100 milliGRAM(s) Oral two times a day PRN Constipation  glucagon  Injectable 1 milliGRAM(s) IntraMuscular once PRN Glucose LESS THAN 70 milligrams/deciliter  senna 2 Tablet(s) Oral at bedtime PRN Constipation      Allergies    No Known Allergies    Intolerances            Vital Signs Last 24 Hrs  T(C): 37.1 (25 Sep 2018 07:15), Max: 37.4 (25 Sep 2018 06:16)  T(F): 98.7 (25 Sep 2018 07:15), Max: 99.3 (25 Sep 2018 06:16)  HR: 81 (25 Sep 2018 07:15) (73 - 87)  BP: 152/77 (25 Sep 2018 07:15) (125/72 - 152/77)  BP(mean): --  RR: 18 (25 Sep 2018 07:15) (17 - 18)  SpO2: 90% (25 Sep 2018 07:15) (90% - 99%)    PHYSICAL EXAM:  GENERAL: Awake, comfortable  NECK: Supple, No JVD  NERVOUS SYSTEM:  A/O x3, non focal  CHEST: + crackles; diminished BS at bases  HEART:  RRR, No rub  ABDOMEN: Soft, NT/ND BS+  EXTREMITIES:  No Edema; L AVF + thrill and bruit    LABS:                        8.5    6.4   )-----------( 432      ( 25 Sep 2018 06:51 )             28.1     09-25    129<L>  |  90<L>  |  53.0<H>  ----------------------------<  242<H>  5.6<H>   |  24.0  |  5.88<H>    Ca    8.7      25 Sep 2018 06:51  Phos  3.5     09-25  Mg     2.0     09-24          Phosphorus Level, Serum: 3.5 mg/dL (09-25 @ 06:51)      RADIOLOGY & ADDITIONAL TESTS:

## 2018-09-25 NOTE — PROGRESS NOTE ADULT - ASSESSMENT
Pt is a 58 year old female with PMHx of PNA with parapneumonic effusion s/p Chest tube on last admission, ESRD on HD, NICM/ CHF with EF 20-25%, nonobstructive CAD, Moderate MR /TR, pericardial effusion s/p pericardiocentesis, PAF not on AC due to GIB hx while on AC, HTN, DM2, AOCD, recurrent hospitalization, presents after recommended to come to hospital by her home nurse due to SOB with some cough. In ED noted to have volume overload, pleural effusion, hyperkalemia. Seen by nephrology in ED and emergent HD done due to volume overload. Nephrology /cardiology on board.    1) SOB due to Volume overload 2/2 ESRD and CHF: improved  - Renal following and on HD    - C/w entresto and coreg  - Cardiology recs appreciated outpatient follow up with cardio to reassess EF in november, no need for life vest prior to discharge.    2) Pleural Effusion:  - plan as above    3) Acute on chronic combined CHF:  - EF 20-25%- worsened from EF 45-50%  - C/w ASA / Coreg/ Entresto  - C/w HD for fluid removal.  - Cardiology recs appreciated outpatient follow up with cardio to reassess EF in november     4) H/o ESRD  - c/w HD and renal following    5) HTN  - c/w  Entresto and coreg    6) PAF  - Not on AC due to hx of GIB  - C/w Coreg, ASA    7) DM type 2  - lantus, CSI + FS monitoring    8) Anemia of chronic disease  - stable and monitor   - c/w epogen with HD    9) Fever  - Resolved.  Asymptomatic.    Disposition - PT to re-eval as patient has stairs.

## 2018-09-25 NOTE — PROGRESS NOTE ADULT - ASSESSMENT
CRF(V):   CM (EF=25%) with acute and chronic CHF, pleural effusions  - HD today with UF as tolerates  - reinforced with paient low salt, K+ diet and fluid restriction    Anemia: MARGI at HD  - follow H/H  - target Hgb=10.0    RO: low phos diet  - cont Rocaltrol and CaCO3

## 2018-09-25 NOTE — PROGRESS NOTE ADULT - SUBJECTIVE AND OBJECTIVE BOX
CC: Volume Overload     HPI:  Pt is a 59 yo F w/ SOB likely 2/2 to volume overload, PMH recent PNA w/ parapneumonic effusion s/p Chest tube, ESRD on HD, NICM, CAD, CHF w/ EF 20-25%, DM2, parox A fib, h/o GI bleed.   Patient presents after recommended to come to hospital by her home nurse due to SOB. While in ED patient received a dose of Zosyn, was evaluated by nephrology who recommended emergent dialysis.     INTERVAL HPI/OVERNIGHT EVENTS: Patient seen and examined with  at bedside.  Patient s/p HD today.  Patient denies any headache, dizziness, SOB, CP, abdominal pain, nausea, vomiting.  Other ROS reviewed and are negative.    Vital Signs Last 24 Hrs  T(C): 36.8 (25 Sep 2018 11:17), Max: 37.4 (25 Sep 2018 06:16)  T(F): 98.2 (25 Sep 2018 11:17), Max: 99.3 (25 Sep 2018 06:16)  HR: 83 (25 Sep 2018 11:17) (73 - 87)  BP: 136/78 (25 Sep 2018 11:17) (125/72 - 152/77)  BP(mean): --  RR: 18 (25 Sep 2018 11:17) (17 - 18)  SpO2: 95% (25 Sep 2018 11:17) (90% - 99%)  I&O's Detail    24 Sep 2018 07:01  -  25 Sep 2018 07:00  --------------------------------------------------------  IN:  Total IN: 0 mL    OUT:    Voided: 300 mL  Total OUT: 300 mL    Total NET: -300 mL        PHYSICAL EXAM:  GENERAL: NAD, well-groomed, well-developed  HEAD:  Atraumatic, Normocephalic  NECK: Supple, No JVD, Normal thyroid  NERVOUS SYSTEM:  Alert & Oriented X3, Good concentration; Motor Strength 5/5 B/L upper and lower extremities  CHEST/LUNG: Clear to percussion bilaterally; No rales, rhonchi, wheezing, or rubs  HEART: Regular rate and rhythm; No murmurs, rubs, or gallops  ABDOMEN: Soft, Nontender, Nondistended; Bowel sounds present  EXTREMITIES:  2+ Peripheral Pulses, No clubbing, cyanosis, or edema  LYMPH: No lymphadenopathy noted  SKIN: No rashes or lesions                              8.5    6.4   )-----------( 432      ( 25 Sep 2018 06:51 )             28.1     25 Sep 2018 06:51    129    |  90     |  53.0   ----------------------------<  242    5.6     |  24.0   |  5.88     Ca    8.7        25 Sep 2018 06:51  Phos  3.5       25 Sep 2018 06:51  Mg     2.0       24 Sep 2018 05:53        CAPILLARY BLOOD GLUCOSE      POCT Blood Glucose.: 169 mg/dL (25 Sep 2018 12:08)  POCT Blood Glucose.: 184 mg/dL (25 Sep 2018 08:05)  POCT Blood Glucose.: 230 mg/dL (24 Sep 2018 20:59)  POCT Blood Glucose.: 200 mg/dL (24 Sep 2018 17:09)          MEDICATIONS  (STANDING):  ALBUTerol/ipratropium for Nebulization 3 milliLiter(s) Nebulizer every 6 hours  aspirin enteric coated 81 milliGRAM(s) Oral daily  calcitriol   Capsule 0.25 MICROGram(s) Oral daily  calcium carbonate   1250 mG (OsCal) 1 Tablet(s) Oral three times a day  carvedilol 12.5 milliGRAM(s) Oral every 12 hours  chlorhexidine 2% Cloths 1 Application(s) Topical <User Schedule>  dextrose 5%. 1000 milliLiter(s) (50 mL/Hr) IV Continuous <Continuous>  dextrose 50% Injectable 12.5 Gram(s) IV Push once  epoetin gian Injectable 19689 Unit(s) SubCutaneous every 7 days  heparin  Injectable 5000 Unit(s) SubCutaneous every 8 hours  influenza   Vaccine 0.5 milliLiter(s) IntraMuscular once  insulin lispro (HumaLOG) corrective regimen sliding scale   SubCutaneous three times a day before meals  mupirocin 2% Nasal 1 Application(s) Nasal two times a day  pantoprazole    Tablet 40 milliGRAM(s) Oral before breakfast  sacubitril 97 mG/valsartan 103 mG 1 Tablet(s) Oral two times a day    MEDICATIONS  (PRN):  acetaminophen   Tablet .. 650 milliGRAM(s) Oral every 6 hours PRN Temp greater or equal to 38C (100.4F), Moderate Pain (4 - 6)  dextrose 40% Gel 15 Gram(s) Oral once PRN Blood Glucose LESS THAN 70 milliGRAM(s)/deciliter  docusate sodium 100 milliGRAM(s) Oral two times a day PRN Constipation  glucagon  Injectable 1 milliGRAM(s) IntraMuscular once PRN Glucose LESS THAN 70 milligrams/deciliter  senna 2 Tablet(s) Oral at bedtime PRN Constipation      RADIOLOGY & ADDITIONAL TESTS: CC: Volume Overload     HPI:  Pt is a 59 yo F w/ SOB likely 2/2 to volume overload, PMH recent PNA w/ parapneumonic effusion s/p Chest tube, ESRD on HD, NICM, CAD, CHF w/ EF 20-25%, DM2, parox A fib, h/o GI bleed.   Patient presents after recommended to come to hospital by her home nurse due to SOB. While in ED patient received a dose of Zosyn, was evaluated by nephrology who recommended emergent dialysis.     INTERVAL HPI/OVERNIGHT EVENTS: Patient seen and examined with  at bedside.  Patient s/p HD today.  Patient denies any headache, dizziness, SOB, CP, abdominal pain, nausea, vomiting.  Other ROS reviewed and are negative.    Vital Signs Last 24 Hrs  T(C): 36.8 (25 Sep 2018 11:17), Max: 37.4 (25 Sep 2018 06:16)  T(F): 98.2 (25 Sep 2018 11:17), Max: 99.3 (25 Sep 2018 06:16)  HR: 83 (25 Sep 2018 11:17) (73 - 87)  BP: 136/78 (25 Sep 2018 11:17) (125/72 - 152/77)  BP(mean): --  RR: 18 (25 Sep 2018 11:17) (17 - 18)  SpO2: 95% (25 Sep 2018 11:17) (90% - 99%)  I&O's Detail    24 Sep 2018 07:01  -  25 Sep 2018 07:00  --------------------------------------------------------  IN:  Total IN: 0 mL    OUT:    Voided: 300 mL  Total OUT: 300 mL    Total NET: -300 mL        PHYSICAL EXAM:  GENERAL: NAD, well-groomed, well-developed  HEAD:  Atraumatic, Normocephalic  NECK: Supple, No JVD, Normal thyroid  NERVOUS SYSTEM:  Alert & Oriented X3, Good concentration; Motor Strength 5/5 B/L upper and lower extremities  CHEST/LUNG: Clear to auscultation bilaterally; No rales, rhonchi, wheezing, or rubs  HEART: Regular rate and rhythm; No murmurs, rubs, or gallops  ABDOMEN: Soft, Nontender, Nondistended; Bowel sounds present  EXTREMITIES:  2+ Peripheral Pulses, No clubbing, cyanosis, or edema                              8.5    6.4   )-----------( 432      ( 25 Sep 2018 06:51 )             28.1     25 Sep 2018 06:51    129    |  90     |  53.0   ----------------------------<  242    5.6     |  24.0   |  5.88     Ca    8.7        25 Sep 2018 06:51  Phos  3.5       25 Sep 2018 06:51  Mg     2.0       24 Sep 2018 05:53        CAPILLARY BLOOD GLUCOSE  POCT Blood Glucose.: 169 mg/dL (25 Sep 2018 12:08)  POCT Blood Glucose.: 184 mg/dL (25 Sep 2018 08:05)  POCT Blood Glucose.: 230 mg/dL (24 Sep 2018 20:59)  POCT Blood Glucose.: 200 mg/dL (24 Sep 2018 17:09)          MEDICATIONS  (STANDING):  ALBUTerol/ipratropium for Nebulization 3 milliLiter(s) Nebulizer every 6 hours  aspirin enteric coated 81 milliGRAM(s) Oral daily  calcitriol   Capsule 0.25 MICROGram(s) Oral daily  calcium carbonate   1250 mG (OsCal) 1 Tablet(s) Oral three times a day  carvedilol 12.5 milliGRAM(s) Oral every 12 hours  chlorhexidine 2% Cloths 1 Application(s) Topical <User Schedule>  dextrose 5%. 1000 milliLiter(s) (50 mL/Hr) IV Continuous <Continuous>  dextrose 50% Injectable 12.5 Gram(s) IV Push once  epoetin gian Injectable 24526 Unit(s) SubCutaneous every 7 days  heparin  Injectable 5000 Unit(s) SubCutaneous every 8 hours  influenza   Vaccine 0.5 milliLiter(s) IntraMuscular once  insulin lispro (HumaLOG) corrective regimen sliding scale   SubCutaneous three times a day before meals  mupirocin 2% Nasal 1 Application(s) Nasal two times a day  pantoprazole    Tablet 40 milliGRAM(s) Oral before breakfast  sacubitril 97 mG/valsartan 103 mG 1 Tablet(s) Oral two times a day    MEDICATIONS  (PRN):  acetaminophen   Tablet .. 650 milliGRAM(s) Oral every 6 hours PRN Temp greater or equal to 38C (100.4F), Moderate Pain (4 - 6)  dextrose 40% Gel 15 Gram(s) Oral once PRN Blood Glucose LESS THAN 70 milliGRAM(s)/deciliter  docusate sodium 100 milliGRAM(s) Oral two times a day PRN Constipation  glucagon  Injectable 1 milliGRAM(s) IntraMuscular once PRN Glucose LESS THAN 70 milligrams/deciliter  senna 2 Tablet(s) Oral at bedtime PRN Constipation

## 2018-09-25 NOTE — PROGRESS NOTE ADULT - SUBJECTIVE AND OBJECTIVE BOX
JAROCHO MORALES  58y  Female  [id number]      Patient is a 58y old  Female who presents with a chief complaint of Volume Overload       Reason for follow up: anemia    HPI:  Pt is a 57 yo F w/ SOB likely 2/2 to volume overload, PMH recent PNA w/ parapneumonic effusion s/p Chest tube, ESRD on HD, NICM, CAD, CHF w/ EF 20-25%, DM2, parox A fib, h/o GI bleed.   Patient presents after recommended to come to hospital by her home nurse due to SOB. Patient has been having SOB beginning today  and continunous non-productive cough since her previous admission, she has not felt fevers, but has felt chills, she completed antibiotics, she does have chest pain only when she coughs and it is not associated w/ exertion.   She does have a mild headache, no change in vision or hearing. Denies abd pain, diarrhea, constipation, melena, hematochezia, dysuria. She produces small amounts of urine daily.     While in ED patient received a dose of Zosyn, was evaluated by nephrology who recommended emergent dialysis.    INTERVAL HISTORY    patient reports that she is feeling better. no bleeding.       PAST MEDICAL & SURGICAL HISTORY:  Pleural effusion  Pericardial effusion  End stage renal disease on dialysis  HLD (hyperlipidemia)  HTN (hypertension)  DM (diabetes mellitus)  A-V fistula  Encounter for dialysis catheter care    FAMILY HISTORY:  Family history of kidney disease in brother (Sibling)        PHYSICAL EXAM:  Vital Signs Last 24 Hrs  T(C): 36.8 (25 Sep 2018 11:17), Max: 37.4 (25 Sep 2018 06:16)  T(F): 98.2 (25 Sep 2018 11:17), Max: 99.3 (25 Sep 2018 06:16)  HR: 88 (25 Sep 2018 14:51) (73 - 93)  BP: 136/78 (25 Sep 2018 11:17) (136/78 - 152/77)  BP(mean): --  RR: 18 (25 Sep 2018 11:17) (18 - 18)  SpO2: 97% (25 Sep 2018 15:18) (90% - 99%)  Alert and oriented  In NAD  Looks chromically ill.  Mild pallor  No jaundice  No LAD in the neck  Lungs: Clear  Heart: RR  Abd: Non-tender  No HSM.  Exts:no edema                  8.5                  129  | 24.0 | 53.0         6.4   >-----------< 432     ------------------------< 242                   28.1                 5.6  | 90   | 5.88                                         Ca 8.7   Mg x     Ph 3.5      ferritin: 627    Anemia; secondary to renal insufficiency. on procrit. will check SPEP, DAMIAN and k/l ratio. can follow as outpatient.

## 2018-09-26 ENCOUNTER — TRANSCRIPTION ENCOUNTER (OUTPATIENT)
Age: 58
End: 2018-09-26

## 2018-09-26 VITALS
OXYGEN SATURATION: 96 % | RESPIRATION RATE: 18 BRPM | DIASTOLIC BLOOD PRESSURE: 77 MMHG | TEMPERATURE: 98 F | HEART RATE: 84 BPM | SYSTOLIC BLOOD PRESSURE: 135 MMHG

## 2018-09-26 LAB
ANION GAP SERPL CALC-SCNC: 13 MMOL/L — SIGNIFICANT CHANGE UP (ref 5–17)
BUN SERPL-MCNC: 35 MG/DL — HIGH (ref 8–20)
CALCIUM SERPL-MCNC: 8.6 MG/DL — SIGNIFICANT CHANGE UP (ref 8.6–10.2)
CHLORIDE SERPL-SCNC: 91 MMOL/L — LOW (ref 98–107)
CO2 SERPL-SCNC: 26 MMOL/L — SIGNIFICANT CHANGE UP (ref 22–29)
CREAT SERPL-MCNC: 4.43 MG/DL — HIGH (ref 0.5–1.3)
GLUCOSE BLDC GLUCOMTR-MCNC: 172 MG/DL — HIGH (ref 70–99)
GLUCOSE BLDC GLUCOMTR-MCNC: 225 MG/DL — HIGH (ref 70–99)
GLUCOSE BLDC GLUCOMTR-MCNC: 234 MG/DL — HIGH (ref 70–99)
GLUCOSE SERPL-MCNC: 206 MG/DL — HIGH (ref 70–115)
HCT VFR BLD CALC: 29.9 % — LOW (ref 37–47)
HGB BLD-MCNC: 8.8 G/DL — LOW (ref 12–16)
MCHC RBC-ENTMCNC: 26.8 PG — LOW (ref 27–31)
MCHC RBC-ENTMCNC: 29.4 G/DL — LOW (ref 32–36)
MCV RBC AUTO: 91.2 FL — SIGNIFICANT CHANGE UP (ref 81–99)
PLATELET # BLD AUTO: 453 K/UL — HIGH (ref 150–400)
POTASSIUM SERPL-MCNC: 5.1 MMOL/L — SIGNIFICANT CHANGE UP (ref 3.5–5.3)
POTASSIUM SERPL-SCNC: 5.1 MMOL/L — SIGNIFICANT CHANGE UP (ref 3.5–5.3)
RBC # BLD: 3.28 M/UL — LOW (ref 4.4–5.2)
RBC # FLD: 16 % — HIGH (ref 11–15.6)
SODIUM SERPL-SCNC: 130 MMOL/L — LOW (ref 135–145)
WBC # BLD: 6.4 K/UL — SIGNIFICANT CHANGE UP (ref 4.8–10.8)
WBC # FLD AUTO: 6.4 K/UL — SIGNIFICANT CHANGE UP (ref 4.8–10.8)

## 2018-09-26 PROCEDURE — 99239 HOSP IP/OBS DSCHRG MGMT >30: CPT

## 2018-09-26 PROCEDURE — 83880 ASSAY OF NATRIURETIC PEPTIDE: CPT

## 2018-09-26 PROCEDURE — 83550 IRON BINDING TEST: CPT

## 2018-09-26 PROCEDURE — 94760 N-INVAS EAR/PLS OXIMETRY 1: CPT

## 2018-09-26 PROCEDURE — 86706 HEP B SURFACE ANTIBODY: CPT

## 2018-09-26 PROCEDURE — 87640 STAPH A DNA AMP PROBE: CPT

## 2018-09-26 PROCEDURE — 83735 ASSAY OF MAGNESIUM: CPT

## 2018-09-26 PROCEDURE — 87340 HEPATITIS B SURFACE AG IA: CPT

## 2018-09-26 PROCEDURE — 84466 ASSAY OF TRANSFERRIN: CPT

## 2018-09-26 PROCEDURE — 85610 PROTHROMBIN TIME: CPT

## 2018-09-26 PROCEDURE — 36415 COLL VENOUS BLD VENIPUNCTURE: CPT

## 2018-09-26 PROCEDURE — 82962 GLUCOSE BLOOD TEST: CPT

## 2018-09-26 PROCEDURE — 82728 ASSAY OF FERRITIN: CPT

## 2018-09-26 PROCEDURE — 86803 HEPATITIS C AB TEST: CPT

## 2018-09-26 PROCEDURE — 96374 THER/PROPH/DIAG INJ IV PUSH: CPT

## 2018-09-26 PROCEDURE — 99285 EMERGENCY DEPT VISIT HI MDM: CPT | Mod: 25

## 2018-09-26 PROCEDURE — 84155 ASSAY OF PROTEIN SERUM: CPT

## 2018-09-26 PROCEDURE — 97530 THERAPEUTIC ACTIVITIES: CPT

## 2018-09-26 PROCEDURE — 80048 BASIC METABOLIC PNL TOTAL CA: CPT

## 2018-09-26 PROCEDURE — 85730 THROMBOPLASTIN TIME PARTIAL: CPT

## 2018-09-26 PROCEDURE — 99261: CPT

## 2018-09-26 PROCEDURE — 87040 BLOOD CULTURE FOR BACTERIA: CPT

## 2018-09-26 PROCEDURE — 86334 IMMUNOFIX E-PHORESIS SERUM: CPT

## 2018-09-26 PROCEDURE — 84484 ASSAY OF TROPONIN QUANT: CPT

## 2018-09-26 PROCEDURE — 80053 COMPREHEN METABOLIC PANEL: CPT

## 2018-09-26 PROCEDURE — 85027 COMPLETE CBC AUTOMATED: CPT

## 2018-09-26 PROCEDURE — 84702 CHORIONIC GONADOTROPIN TEST: CPT

## 2018-09-26 PROCEDURE — 96375 TX/PRO/DX INJ NEW DRUG ADDON: CPT

## 2018-09-26 PROCEDURE — 84165 PROTEIN E-PHORESIS SERUM: CPT

## 2018-09-26 PROCEDURE — 84100 ASSAY OF PHOSPHORUS: CPT

## 2018-09-26 PROCEDURE — 97163 PT EVAL HIGH COMPLEX 45 MIN: CPT

## 2018-09-26 PROCEDURE — 71045 X-RAY EXAM CHEST 1 VIEW: CPT

## 2018-09-26 PROCEDURE — T1013: CPT

## 2018-09-26 PROCEDURE — 94640 AIRWAY INHALATION TREATMENT: CPT

## 2018-09-26 PROCEDURE — 86705 HEP B CORE ANTIBODY IGM: CPT

## 2018-09-26 PROCEDURE — 97116 GAIT TRAINING THERAPY: CPT

## 2018-09-26 PROCEDURE — 93005 ELECTROCARDIOGRAM TRACING: CPT

## 2018-09-26 PROCEDURE — 93308 TTE F-UP OR LMTD: CPT

## 2018-09-26 RX ORDER — INSULIN GLARGINE 100 [IU]/ML
10 INJECTION, SOLUTION SUBCUTANEOUS
Qty: 0 | Refills: 0 | COMMUNITY
Start: 2018-09-26

## 2018-09-26 RX ORDER — CALCITRIOL 0.5 UG/1
1 CAPSULE ORAL
Qty: 0 | Refills: 0 | DISCHARGE
Start: 2018-09-26

## 2018-09-26 RX ORDER — SACUBITRIL AND VALSARTAN 24; 26 MG/1; MG/1
1 TABLET, FILM COATED ORAL
Qty: 60 | Refills: 0 | OUTPATIENT
Start: 2018-09-26 | End: 2018-10-25

## 2018-09-26 RX ADMIN — Medication 2: at 07:59

## 2018-09-26 RX ADMIN — SACUBITRIL AND VALSARTAN 1 TABLET(S): 24; 26 TABLET, FILM COATED ORAL at 05:26

## 2018-09-26 RX ADMIN — HEPARIN SODIUM 5000 UNIT(S): 5000 INJECTION INTRAVENOUS; SUBCUTANEOUS at 05:26

## 2018-09-26 RX ADMIN — Medication 2: at 11:25

## 2018-09-26 RX ADMIN — CHLORHEXIDINE GLUCONATE 1 APPLICATION(S): 213 SOLUTION TOPICAL at 05:25

## 2018-09-26 RX ADMIN — CARVEDILOL PHOSPHATE 12.5 MILLIGRAM(S): 80 CAPSULE, EXTENDED RELEASE ORAL at 05:26

## 2018-09-26 RX ADMIN — Medication 1 TABLET(S): at 05:25

## 2018-09-26 RX ADMIN — PANTOPRAZOLE SODIUM 40 MILLIGRAM(S): 20 TABLET, DELAYED RELEASE ORAL at 05:26

## 2018-09-26 RX ADMIN — Medication 3 MILLILITER(S): at 03:53

## 2018-09-26 RX ADMIN — SACUBITRIL AND VALSARTAN 1 TABLET(S): 24; 26 TABLET, FILM COATED ORAL at 17:30

## 2018-09-26 RX ADMIN — Medication 81 MILLIGRAM(S): at 11:35

## 2018-09-26 RX ADMIN — Medication 1: at 17:30

## 2018-09-26 RX ADMIN — Medication 3 MILLILITER(S): at 09:18

## 2018-09-26 RX ADMIN — MUPIROCIN 1 APPLICATION(S): 20 OINTMENT TOPICAL at 05:25

## 2018-09-26 RX ADMIN — CALCITRIOL 0.25 MICROGRAM(S): 0.5 CAPSULE ORAL at 11:35

## 2018-09-26 RX ADMIN — CARVEDILOL PHOSPHATE 12.5 MILLIGRAM(S): 80 CAPSULE, EXTENDED RELEASE ORAL at 17:30

## 2018-09-26 NOTE — PROGRESS NOTE ADULT - ASSESSMENT
ESRD MWF schedule at [facility] unit  will HD today short treatment to get back on schecule  CM (EF=25%) with acute and chronic CHF, pleural effusions  - HD today with UF as tolerates  - reinforced with paient low salt, K+ diet and fluid restriction    Anemia: MARGI at HD  - follow H/H  - target Hgb=10.0    RO: low phos diet  - cont Rocaltrol and CaCO3    OK to d/c from renal perspective after HD   She must go to her ou pt unit for HD on Fri

## 2018-09-26 NOTE — DISCHARGE NOTE ADULT - PATIENT PORTAL LINK FT
You can access the [facility] Patient Portal, offered by [facility], by registering with the following website: [email or web address]

## 2018-09-26 NOTE — PROGRESS NOTE ADULT - SUBJECTIVE AND OBJECTIVE BOX
CC: Volume Overload     HPI:  Pt is a 59 yo F w/ SOB likely 2/2 to volume overload, PMH recent PNA w/ parapneumonic effusion s/p Chest tube now resolved, ESRD on HD, NICM, CAD, CHF w/ EF 20-25%, DM2, parox A fib, h/o GI bleed.   Patient presents after recommended to come to hospital by her home nurse due to SOB. While in ED patient received a dose of Zosyn, was evaluated by nephrology who recommended emergent dialysis.     INTERVAL HPI/OVERNIGHT EVENTS: Patient seen and examined lying in bed.  Patient denies any headache, dizziness, SOB, CP, abdominal pain, nausea, vomiting.  Other ROS reviewed and are negative.  Patient evaluated for O2 at home - RA at rest 95%; 83% ambulating on RA; recovered to 90% ambulating on 2Liters via NC.    Vital Signs Last 24 Hrs  T(C): 36.4 (26 Sep 2018 07:21), Max: 37.3 (26 Sep 2018 00:09)  T(F): 97.5 (26 Sep 2018 07:21), Max: 99.2 (26 Sep 2018 00:09)  HR: 86 (26 Sep 2018 09:18) (81 - 93)  BP: 131/78 (26 Sep 2018 07:21) (124/70 - 134/72)  BP(mean): --  RR: 18 (26 Sep 2018 07:21) (18 - 18)  SpO2: 97% (26 Sep 2018 09:18) (93% - 98%)  I&O's Detail      PHYSICAL EXAM:  GENERAL: NAD  HEAD:  Atraumatic, Normocephalic  NECK: Supple, No JVD, Normal thyroid  NERVOUS SYSTEM:  Alert & Oriented X3, Good concentration; Motor Strength 5/5 B/L upper and lower extremities  CHEST/LUNG: Clear to auscultation bilaterally; No rales, rhonchi, wheezing, or rubs  HEART: Regular rate and rhythm; No murmurs, rubs, or gallops  ABDOMEN: Soft, Nontender, Nondistended; Bowel sounds present  EXTREMITIES:  2+ Peripheral Pulses, No clubbing, cyanosis, or edema                            8.8    6.4   )-----------( 453      ( 26 Sep 2018 07:16 )             29.9     26 Sep 2018 07:16    130    |  91     |  35.0   ----------------------------<  206    5.1     |  26.0   |  4.43     Ca    8.6        26 Sep 2018 07:16  Phos  3.5       25 Sep 2018 06:51        CAPILLARY BLOOD GLUCOSE  POCT Blood Glucose.: 234 mg/dL (26 Sep 2018 11:21)  POCT Blood Glucose.: 225 mg/dL (26 Sep 2018 07:51)  POCT Blood Glucose.: 249 mg/dL (25 Sep 2018 23:08)  POCT Blood Glucose.: 310 mg/dL (25 Sep 2018 17:28)          MEDICATIONS  (STANDING):  ALBUTerol/ipratropium for Nebulization 3 milliLiter(s) Nebulizer every 6 hours  aspirin enteric coated 81 milliGRAM(s) Oral daily  calcitriol   Capsule 0.25 MICROGram(s) Oral daily  calcium carbonate   1250 mG (OsCal) 1 Tablet(s) Oral three times a day  carvedilol 12.5 milliGRAM(s) Oral every 12 hours  chlorhexidine 2% Cloths 1 Application(s) Topical <User Schedule>  dextrose 5%. 1000 milliLiter(s) (50 mL/Hr) IV Continuous <Continuous>  dextrose 50% Injectable 12.5 Gram(s) IV Push once  epoetin gian Injectable 80754 Unit(s) SubCutaneous every 7 days  heparin  Injectable 5000 Unit(s) SubCutaneous every 8 hours  influenza   Vaccine 0.5 milliLiter(s) IntraMuscular once  insulin lispro (HumaLOG) corrective regimen sliding scale   SubCutaneous three times a day before meals  mupirocin 2% Nasal 1 Application(s) Nasal two times a day  pantoprazole    Tablet 40 milliGRAM(s) Oral before breakfast  sacubitril 97 mG/valsartan 103 mG 1 Tablet(s) Oral two times a day    MEDICATIONS  (PRN):  acetaminophen   Tablet .. 650 milliGRAM(s) Oral every 6 hours PRN Temp greater or equal to 38C (100.4F), Moderate Pain (4 - 6)  dextrose 40% Gel 15 Gram(s) Oral once PRN Blood Glucose LESS THAN 70 milliGRAM(s)/deciliter  docusate sodium 100 milliGRAM(s) Oral two times a day PRN Constipation  glucagon  Injectable 1 milliGRAM(s) IntraMuscular once PRN Glucose LESS THAN 70 milligrams/deciliter  senna 2 Tablet(s) Oral at bedtime PRN Constipation

## 2018-09-26 NOTE — DISCHARGE NOTE ADULT - MEDICATION SUMMARY - MEDICATIONS TO CHANGE
I will SWITCH the dose or number of times a day I take the medications listed below when I get home from the hospital:    insulin glargine 100 units/mL subcutaneous solution  -- 16 unit(s) subcutaneous once a day (at bedtime)   -- Do not drink alcoholic beverages when taking this medication.  It is very important that you take or use this exactly as directed.  Do not skip doses or discontinue unless directed by your doctor.  Keep in refrigerator.  Do not freeze.    sacubitril-valsartan 49 mg-51 mg oral tablet  -- 1 tab(s) by mouth 2 times a day    calcitriol 0.25 mcg oral capsule  -- 1 cap(s) by mouth 2 times a day

## 2018-09-26 NOTE — DISCHARGE NOTE ADULT - SECONDARY DIAGNOSIS.
NICM (nonischemic cardiomyopathy) Pleural effusion Paroxysmal A-fib Essential hypertension Hyperlipidemia, unspecified hyperlipidemia type DM (diabetes mellitus)

## 2018-09-26 NOTE — DISCHARGE NOTE ADULT - HOSPITAL COURSE
Pt is a 58 year old female with PMHx of PNA with parapneumonic effusion s/p Chest tube on last admission now resolved, ESRD on HD, NICM/ CHF with EF 20-25%, nonobstructive CAD, Moderate MR /TR, pericardial effusion s/p pericardiocentesis, PAF not on AC due to GIB hx while on AC, HTN, DM2, AOCD, recurrent hospitalization, presents after recommended to come to hospital by her home nurse due to SOB with some cough. In ED noted to have volume overload, pleural effusion, hyperkalemia. Seen by nephrology in ED and emergent HD done due to volume overload. Nephrology /cardiology on board.  Patient continued to have HD treatments with improvement, but continues to be hypoxic requiring home O2.  Patient evaluated by PT and stable for discharge to home with home care and home O2. Pt is a 58 year old female with PMHx of PNA with parapneumonic effusion s/p Chest tube on last admission now resolved, ESRD on HD, NICM/ CHF with EF 20-25%, nonobstructive CAD, Moderate MR /TR, pericardial effusion s/p pericardiocentesis, PAF not on AC due to GIB hx while on AC, HTN, DM2, AOCD, recurrent hospitalization, presents after recommended to come to hospital by her home nurse due to SOB with some cough. In ED noted to have volume overload, pleural effusion, hyperkalemia. Seen by nephrology in ED and emergent HD done due to volume overload. Nephrology /cardiology on board.  Patient continued to have HD treatments with improvement, but continues to be hypoxic requiring home O2.  Patient evaluated by PT and stable for discharge to home with home care and home O2.    total time spent for discharge 36 minutes

## 2018-09-26 NOTE — PROGRESS NOTE ADULT - SUBJECTIVE AND OBJECTIVE BOX
NEPHROLOGY INTERVAL HPI/OVERNIGHT EVENTS:    Examined earlier  feeling better    MEDICATIONS  (STANDING):  ALBUTerol/ipratropium for Nebulization 3 milliLiter(s) Nebulizer every 6 hours  aspirin enteric coated 81 milliGRAM(s) Oral daily  calcitriol   Capsule 0.25 MICROGram(s) Oral daily  calcium carbonate   1250 mG (OsCal) 1 Tablet(s) Oral three times a day  carvedilol 12.5 milliGRAM(s) Oral every 12 hours  chlorhexidine 2% Cloths 1 Application(s) Topical <User Schedule>  dextrose 5%. 1000 milliLiter(s) (50 mL/Hr) IV Continuous <Continuous>  dextrose 50% Injectable 12.5 Gram(s) IV Push once  epoetin gian Injectable 57801 Unit(s) SubCutaneous every 7 days  heparin  Injectable 5000 Unit(s) SubCutaneous every 8 hours  influenza   Vaccine 0.5 milliLiter(s) IntraMuscular once  insulin lispro (HumaLOG) corrective regimen sliding scale   SubCutaneous three times a day before meals  mupirocin 2% Nasal 1 Application(s) Nasal two times a day  pantoprazole    Tablet 40 milliGRAM(s) Oral before breakfast  sacubitril 97 mG/valsartan 103 mG 1 Tablet(s) Oral two times a day    MEDICATIONS  (PRN):  acetaminophen   Tablet .. 650 milliGRAM(s) Oral every 6 hours PRN Temp greater or equal to 38C (100.4F), Moderate Pain (4 - 6)  dextrose 40% Gel 15 Gram(s) Oral once PRN Blood Glucose LESS THAN 70 milliGRAM(s)/deciliter  docusate sodium 100 milliGRAM(s) Oral two times a day PRN Constipation  glucagon  Injectable 1 milliGRAM(s) IntraMuscular once PRN Glucose LESS THAN 70 milligrams/deciliter  senna 2 Tablet(s) Oral at bedtime PRN Constipation      Allergies    No Known Allergies    Intolerances        Vital Signs Last 24 Hrs  T(C): 36.4 (26 Sep 2018 07:21), Max: 37.3 (26 Sep 2018 00:09)  T(F): 97.5 (26 Sep 2018 07:21), Max: 99.2 (26 Sep 2018 00:09)  HR: 86 (26 Sep 2018 09:18) (81 - 93)  BP: 131/78 (26 Sep 2018 07:21) (124/70 - 134/72)  BP(mean): --  RR: 18 (26 Sep 2018 07:21) (18 - 18)  SpO2: 97% (26 Sep 2018 09:18) (93% - 98%)  Daily     Daily Weight in k.2 (26 Sep 2018 12:25)    PHYSICAL EXAM:  GENERAL: Awake, comfortable  NECK: Supple, No JVD  NERVOUS SYSTEM:  A/O x3, non focal  CHEST: + crackles; diminished BS at bases  HEART:  RRR, No rub  ABDOMEN: Soft, NT/ND BS+  EXTREMITIES:  No Edema; L AVF + thrill and bruit    LABS:                        8.8    6.4   )-----------( 453      ( 26 Sep 2018 07:16 )             29.9     09-26    130<L>  |  91<L>  |  35.0<H>  ----------------------------<  206<H>  5.1   |  26.0  |  4.43<H>    Ca    8.6      26 Sep 2018 07:16  Phos  3.5     09-25                  RADIOLOGY & ADDITIONAL TESTS:

## 2018-09-26 NOTE — DISCHARGE NOTE ADULT - PLAN OF CARE
Improved Continue current medications as prescribed.  Follow up with nephrology.  Continue HD Monday, Wednesday, and Friday. Continue current medications as prescribed.  Follow up with primary doctor.  Follow up with cardiology as scheduled 10/1/18. Continue current medications as prescribed.  Follow up with primary doctor.

## 2018-09-26 NOTE — PROGRESS NOTE ADULT - ASSESSMENT
Pt is a 58 year old female with PMHx of PNA with parapneumonic effusion s/p Chest tube on last admission now resolved, ESRD on HD, NICM/ CHF with EF 20-25%, nonobstructive CAD, Moderate MR /TR, pericardial effusion s/p pericardiocentesis, PAF not on AC due to GIB hx while on AC, HTN, DM2, AOCD, recurrent hospitalization, presents after recommended to come to hospital by her home nurse due to SOB with some cough. In ED noted to have volume overload, pleural effusion, hyperkalemia. Seen by nephrology in ED and emergent HD done due to volume overload. Nephrology /cardiology on board.    1) SOB due to Volume overload 2/2 ESRD and CHF: improved; although patient remains hypoxic and requires home O2.  - Renal following and on HD    - C/w entresto and coreg  - Cardiology recs appreciated outpatient follow up with cardio to reassess EF in november, no need for life vest prior to discharge.    2) Pleural Effusion:  - plan as above    3) Acute on chronic combined CHF:  - EF 20-25%- worsened from EF 45-50%  - C/w ASA / Coreg/ Entresto  - C/w HD for fluid removal.  - Cardiology recs appreciated outpatient follow up with cardio to reassess EF in november     4) H/o ESRD  - c/w HD and renal following    5) HTN  - c/w  Entresto and coreg    6) PAF  - Not on AC due to hx of GIB  - C/w Coreg, ASA    7) DM type 2  - lantus, CSI + FS monitoring    8) Anemia of chronic disease  - stable and monitor   - c/w epogen with HD    9) Fever  - Resolved.  Asymptomatic.    Disposition - PT  recommended Home care Pt is a 58 year old female with PMHx of PNA with parapneumonic effusion s/p Chest tube on last admission now resolved, ESRD on HD, NICM/ CHF with EF 20-25%, nonobstructive CAD, Moderate MR /TR, pericardial effusion s/p pericardiocentesis, PAF not on AC due to GIB hx while on AC, HTN, DM2, AOCD, recurrent hospitalization, presents after recommended to come to hospital by her home nurse due to SOB with some cough. In ED noted to have volume overload, pleural effusion, hyperkalemia. Seen by nephrology in ED and emergent HD done due to volume overload. Nephrology /cardiology on board.    1) SOB due to Volume overload 2/2 ESRD and CHF: improved; although patient remains hypoxic and requires home O2.  - pt o2 sat on room air on ambulation is 83%  - Renal following and on HD    - C/w entresto and coreg  - Cardiology recs appreciated outpatient follow up with cardio to reassess EF in november, no need for life vest prior to discharge.    2) Pleural Effusion:  - plan as above    3) Acute on chronic combined CHF:  - EF 20-25%- worsened from EF 45-50%  - C/w ASA / Coreg/ Entresto  - C/w HD for fluid removal.  - Cardiology recs appreciated outpatient follow up with cardio to reassess EF in november     4) H/o ESRD  - c/w HD and renal following    5) HTN  - c/w  Entresto and coreg    6) PAF  - Not on AC due to hx of GIB  - C/w Coreg, ASA    7) DM type 2  - lantus, CSI + FS monitoring    8) Anemia of chronic disease  - stable and monitor   - c/w epogen with HD    9) Fever  - Resolved.  Asymptomatic.    Disposition - PT  recommended Home care

## 2018-09-26 NOTE — DISCHARGE NOTE ADULT - CARE PROVIDER_API CALL
Srini Gay), Internal Medicine; Nephrology  [address]  [place], [place] [id number]  Phone: [phone]  Fax: [phone]    Gissel Salazar (), Cardiology; Internal Medicine  [address]  [place] [address]  Phone: [phone]  Fax: [phone]

## 2018-09-26 NOTE — PROGRESS NOTE ADULT - REASON FOR ADMISSION
Volume Overload

## 2018-09-26 NOTE — PROGRESS NOTE ADULT - PROVIDER SPECIALTY LIST ADULT
Cardiology
Heme/Onc
Heme/Onc
Hospitalist
Internal Medicine
Nephrology
Hospitalist
Cardiology

## 2018-09-26 NOTE — DISCHARGE NOTE ADULT - MEDICATION SUMMARY - MEDICATIONS TO TAKE
I will START or STAY ON the medications listed below when I get home from the hospital:    aspirin 81 mg oral delayed release tablet  -- 1 tab(s) by mouth once a day  -- Indication: For NICM (nonischemic cardiomyopathy)    sacubitril-valsartan 97 mg-103 mg oral tablet  -- 1 tab(s) by mouth 2 times a day  -- Indication: For Chronic combined systolic and diastolic congestive heart failure    calcium carbonate 1250 mg (500 mg elemental calcium) oral tablet  -- 1 tab(s) by mouth 3 times a day  -- Indication: For Supplement    Lantus 100 units/mL subcutaneous solution  -- 10 unit(s) subcutaneous once a day (at bedtime)  -- Indication: For Diabetes    Coreg 12.5 mg oral tablet  -- 1 tab(s) by mouth 2 times a day   -- It is very important that you take or use this exactly as directed.  Do not skip doses or discontinue unless directed by your doctor.  May cause drowsiness.  Alcohol may intensify this effect.  Use care when operating dangerous machinery.  Some non-prescription drugs may aggravate your condition.  Read all labels carefully.  If a warning appears, check with your doctor before taking.  Take with food or milk.    -- Indication: For NICM (nonischemic cardiomyopathy)    FeroSul 325 mg (65 mg elemental iron) oral tablet  -- 1 tab(s) by mouth once a day   -- Indication: For Anemia, unspecified type    Colace 100 mg oral capsule  -- 1 cap(s) by mouth 2 times a day, As Needed -for constipation   -- Indication: For Constipation    senna oral tablet  -- 2 tab(s) by mouth once a day (at bedtime), As needed, Constipation  -- Indication: For Constipation    pantoprazole 40 mg oral delayed release tablet  -- 1 tab(s) by mouth once a day (before a meal)  -- Indication: For GERD    calcitriol 0.25 mcg oral capsule  -- 1 cap(s) by mouth once a day  -- Indication: For End stage renal disease on dialysis

## 2018-09-26 NOTE — PROGRESS NOTE ADULT - ATTENDING COMMENTS
HD again today, CAT chest.
Patient is full code.
Plan of care discussed with Pt / Cardiology.
Plan of care discussed with Pt /RN
Prepare for discharge after pt eval
Pt examined:   GENERAL: NAD, well-groomed  HEENT: PERRL, +EOMI  CHEST/LUNG: Clear to auscultation bilaterally; No wheezing  HEART: S1S2+, Regular rate and rhythm; No murmurs  ABDOMEN: Soft, Nontender, Nondistended; Bowel sounds present  EXTREMITIES:  2+ Peripheral Pulses, No clubbing, cyanosis, or edema    I walked pt this morning on room air and O2 sat was 83% room air on ambulation. Pt will need home O2    continue with entersto and coreg for CHF, seems to be clinically euvolemic    home with homecare    pt will receive HD today prior to discharge since she is a MWF outpatient HD
Pt examined:   GENERAL: NAD, well-groomed  HEENT: PERRL, +EOMI  CHEST/LUNG: Clear to auscultation bilaterally; No wheezing  HEART: S1S2+, Regular rate and rhythm; No murmurs  ABDOMEN: Soft, Nontender, Nondistended; Bowel sounds present  EXTREMITIES:  2+ Peripheral Pulses, No clubbing, cyanosis, or edema    agree with PT eval prior to discharge  continue with entersto and coreg for CHF, seems to be clinically euvolemic
pt seen and examined.  She has NICM, on HD with ESRD.   She is approaching euvolemia.  She is on HF meds.   I agree with the above a/p.

## 2018-09-26 NOTE — PROGRESS NOTE ADULT - SUBJECTIVE AND OBJECTIVE BOX
JAROCHO MORALES  58y  Female  [id number]      Patient is a 58y old  Female who presents with a chief complaint of Volume Overload       Reason for follow up: anemia    HPI:  Pt is a 59 yo F w/ SOB likely 2/2 to volume overload, PMH recent PNA w/ parapneumonic effusion s/p Chest tube, ESRD on HD, NICM, CAD, CHF w/ EF 20-25%, DM2, parox A fib, h/o GI bleed.   Patient presents after recommended to come to hospital by her home nurse due to SOB. Patient has been having SOB beginning today  and continuous non-productive cough since her previous admission, she has not felt fevers, but has felt chills, she completed antibiotics, she does have chest pain only when she coughs and it is not associated w/ exertion.   She does have a mild headache, no change in vision or hearing. Denies abd pain, diarrhea, constipation, melena, hematochezia, dysuria. She produces small amounts of urine daily.     While in ED patient received a dose of Zosyn, was evaluated by nephrology who recommended emergent dialysis.    Patient reports that she is feeling better,  no bleeding.       PAST MEDICAL & SURGICAL HISTORY:  Pleural effusion  Pericardial effusion  End stage renal disease on dialysis  HLD (hyperlipidemia)  HTN (hypertension)  DM (diabetes mellitus)  A-V fistula  Encounter for dialysis catheter care    FAMILY HISTORY:  Family history of kidney disease in brother (Sibling)        PHYSICAL EXAM:  ICU Vital Signs Last 24 Hrs  T(C): 36.4 (26 Sep 2018 07:21), Max: 37.3 (26 Sep 2018 00:09)  T(F): 97.5 (26 Sep 2018 07:21), Max: 99.2 (26 Sep 2018 00:09)  HR: 86 (26 Sep 2018 09:18) (81 - 93)  BP: 131/78 (26 Sep 2018 07:21) (124/70 - 134/72)  BP(mean): --  ABP: --  ABP(mean): --  RR: 18 (26 Sep 2018 07:21) (18 - 18)  SpO2: 97% (26 Sep 2018 09:18) (93% - 98%)  %)    Alert and oriented  In NAD  Looks chromically ill.  Mild pallor  No jaundice  No LAD in the neck  Lungs: Clear  Heart: RR  Abd: Non-tender  No HSM.  Exts:no edema      CBC Full  -  ( 26 Sep 2018 07:16 )  WBC Count : 6.4 K/uL  Hemoglobin : 8.8 g/dL  Hematocrit : 29.9 %  Platelet Count - Automated : 453 K/uL  Mean Cell Volume : 91.2 fl  Mean Cell Hemoglobin : 26.8 pg  Mean Cell Hemoglobin Concentration : 29.4 g/dL  Auto Neutrophil # : x  Auto Lymphocyte # : x  Auto Monocyte # : x  Auto Eosinophil # : x  Auto Basophil # : x  Auto Neutrophil % : x  Auto Lymphocyte % : x  Auto Monocyte % : x  Auto Eosinophil % : x  Auto Basophil % : x    26 Sep 2018 07:16    130    |  91     |  35.0   ----------------------------<  206    5.1     |  26.0   |  4.43     Ca    8.6        26 Sep 2018 07:16  Phos  3.5       25 Sep 2018 06:51        Anemia secondary to renal insufficiency.   On Procrit.  No bleeding.

## 2018-09-26 NOTE — DISCHARGE NOTE ADULT - CARE PROVIDERS DIRECT ADDRESSES
,DirectAddress_Unknown,[email or web address]

## 2018-09-26 NOTE — DISCHARGE NOTE ADULT - CARE PLAN
Principal Discharge DX:	End stage renal disease on dialysis  Goal:	Improved  Assessment and plan of treatment:	Continue current medications as prescribed.  Follow up with nephrology.  Continue HD Monday, Wednesday, and Friday.  Secondary Diagnosis:	NICM (nonischemic cardiomyopathy)  Assessment and plan of treatment:	Continue current medications as prescribed.  Follow up with primary doctor.  Follow up with cardiology as scheduled 10/1/18.  Secondary Diagnosis:	Pleural effusion  Assessment and plan of treatment:	Continue current medications as prescribed.  Follow up with primary doctor.  Follow up with cardiology as scheduled 10/1/18.  Secondary Diagnosis:	Paroxysmal A-fib  Assessment and plan of treatment:	Continue current medications as prescribed.  Follow up with primary doctor.  Follow up with cardiology as scheduled 10/1/18.  Secondary Diagnosis:	Essential hypertension  Assessment and plan of treatment:	Continue current medications as prescribed.  Follow up with primary doctor.  Follow up with cardiology as scheduled 10/1/18.  Secondary Diagnosis:	Hyperlipidemia, unspecified hyperlipidemia type  Assessment and plan of treatment:	Continue current medications as prescribed.  Follow up with primary doctor.  Follow up with cardiology as scheduled 10/1/18.  Secondary Diagnosis:	DM (diabetes mellitus)  Assessment and plan of treatment:	Continue current medications as prescribed.  Follow up with primary doctor.

## 2018-09-27 LAB
% ALBUMIN: 41.4 % — SIGNIFICANT CHANGE UP
% ALPHA 1: 7.9 % — SIGNIFICANT CHANGE UP
% ALPHA 2: 11.9 % — SIGNIFICANT CHANGE UP
% BETA: 10.6 % — SIGNIFICANT CHANGE UP
% GAMMA: 28.2 % — SIGNIFICANT CHANGE UP
ALBUMIN SERPL ELPH-MCNC: 2.9 G/DL — LOW (ref 3.6–5.5)
ALBUMIN/GLOB SERPL ELPH: 0.7 RATIO — SIGNIFICANT CHANGE UP
ALPHA1 GLOB SERPL ELPH-MCNC: 0.6 G/DL — HIGH (ref 0.1–0.4)
ALPHA2 GLOB SERPL ELPH-MCNC: 0.8 G/DL — SIGNIFICANT CHANGE UP (ref 0.5–1)
B-GLOBULIN SERPL ELPH-MCNC: 0.8 G/DL — SIGNIFICANT CHANGE UP (ref 0.5–1)
GAMMA GLOBULIN: 2 G/DL — HIGH (ref 0.6–1.6)
INTERPRETATION SERPL IFE-IMP: SIGNIFICANT CHANGE UP
PROT PATTERN SERPL ELPH-IMP: SIGNIFICANT CHANGE UP
PROT SERPL-MCNC: 7.1 G/DL — SIGNIFICANT CHANGE UP (ref 6–8.3)
PROT SERPL-MCNC: 7.1 G/DL — SIGNIFICANT CHANGE UP (ref 6–8.3)

## 2018-10-01 ENCOUNTER — OUTPATIENT (OUTPATIENT)
Dept: OUTPATIENT SERVICES | Facility: HOSPITAL | Age: 58
LOS: 1 days | End: 2018-10-01
Payer: MEDICARE

## 2018-10-01 ENCOUNTER — APPOINTMENT (OUTPATIENT)
Dept: CARDIOLOGY | Facility: CLINIC | Age: 58
End: 2018-10-01

## 2018-10-01 DIAGNOSIS — Z49.01 ENCOUNTER FOR FITTING AND ADJUSTMENT OF EXTRACORPOREAL DIALYSIS CATHETER: Chronic | ICD-10-CM

## 2018-10-01 DIAGNOSIS — I77.0 ARTERIOVENOUS FISTULA, ACQUIRED: Chronic | ICD-10-CM

## 2018-10-16 ENCOUNTER — INPATIENT (INPATIENT)
Facility: HOSPITAL | Age: 58
LOS: 7 days | Discharge: HOME | DRG: 193 | End: 2018-10-24
Attending: HOSPITALIST | Admitting: INTERNAL MEDICINE
Payer: MEDICARE

## 2018-10-16 VITALS
DIASTOLIC BLOOD PRESSURE: 78 MMHG | HEART RATE: 90 BPM | OXYGEN SATURATION: 94 % | SYSTOLIC BLOOD PRESSURE: 144 MMHG | TEMPERATURE: 98 F | HEIGHT: 63 IN | WEIGHT: 149.03 LBS | RESPIRATION RATE: 18 BRPM

## 2018-10-16 DIAGNOSIS — I48.0 PAROXYSMAL ATRIAL FIBRILLATION: ICD-10-CM

## 2018-10-16 DIAGNOSIS — Z49.01 ENCOUNTER FOR FITTING AND ADJUSTMENT OF EXTRACORPOREAL DIALYSIS CATHETER: Chronic | ICD-10-CM

## 2018-10-16 DIAGNOSIS — E11.8 TYPE 2 DIABETES MELLITUS WITH UNSPECIFIED COMPLICATIONS: ICD-10-CM

## 2018-10-16 DIAGNOSIS — I50.22 CHRONIC SYSTOLIC (CONGESTIVE) HEART FAILURE: ICD-10-CM

## 2018-10-16 DIAGNOSIS — Z29.9 ENCOUNTER FOR PROPHYLACTIC MEASURES, UNSPECIFIED: ICD-10-CM

## 2018-10-16 DIAGNOSIS — I25.10 ATHEROSCLEROTIC HEART DISEASE OF NATIVE CORONARY ARTERY WITHOUT ANGINA PECTORIS: ICD-10-CM

## 2018-10-16 DIAGNOSIS — J81.0 ACUTE PULMONARY EDEMA: ICD-10-CM

## 2018-10-16 DIAGNOSIS — R06.09 OTHER FORMS OF DYSPNEA: ICD-10-CM

## 2018-10-16 DIAGNOSIS — I77.0 ARTERIOVENOUS FISTULA, ACQUIRED: Chronic | ICD-10-CM

## 2018-10-16 DIAGNOSIS — N18.6 END STAGE RENAL DISEASE: ICD-10-CM

## 2018-10-16 DIAGNOSIS — I10 ESSENTIAL (PRIMARY) HYPERTENSION: ICD-10-CM

## 2018-10-16 LAB
ALBUMIN SERPL ELPH-MCNC: 3.3 G/DL — SIGNIFICANT CHANGE UP (ref 3.3–5.2)
ALP SERPL-CCNC: 135 U/L — HIGH (ref 40–120)
ALT FLD-CCNC: 11 U/L — SIGNIFICANT CHANGE UP
ANION GAP SERPL CALC-SCNC: 16 MMOL/L — SIGNIFICANT CHANGE UP (ref 5–17)
ANISOCYTOSIS BLD QL: SLIGHT — SIGNIFICANT CHANGE UP
AST SERPL-CCNC: 17 U/L — SIGNIFICANT CHANGE UP
BASOPHILS # BLD AUTO: 0 K/UL — SIGNIFICANT CHANGE UP (ref 0–0.2)
BASOPHILS NFR BLD AUTO: 0.1 % — SIGNIFICANT CHANGE UP (ref 0–2)
BILIRUB SERPL-MCNC: 1 MG/DL — SIGNIFICANT CHANGE UP (ref 0.4–2)
BUN SERPL-MCNC: 34 MG/DL — HIGH (ref 8–20)
CALCIUM SERPL-MCNC: 8.5 MG/DL — LOW (ref 8.6–10.2)
CHLORIDE SERPL-SCNC: 91 MMOL/L — LOW (ref 98–107)
CO2 SERPL-SCNC: 28 MMOL/L — SIGNIFICANT CHANGE UP (ref 22–29)
CREAT SERPL-MCNC: 3.9 MG/DL — HIGH (ref 0.5–1.3)
ELLIPTOCYTES BLD QL SMEAR: SLIGHT — SIGNIFICANT CHANGE UP
EOSINOPHIL # BLD AUTO: 0.1 K/UL — SIGNIFICANT CHANGE UP (ref 0–0.5)
EOSINOPHIL NFR BLD AUTO: 1.6 % — SIGNIFICANT CHANGE UP (ref 0–6)
GLUCOSE BLDC GLUCOMTR-MCNC: 350 MG/DL — HIGH (ref 70–99)
GLUCOSE SERPL-MCNC: 396 MG/DL — HIGH (ref 70–115)
HCT VFR BLD CALC: 27.3 % — LOW (ref 37–47)
HGB BLD-MCNC: 7.7 G/DL — LOW (ref 12–16)
LYMPHOCYTES # BLD AUTO: 0.8 K/UL — LOW (ref 1–4.8)
LYMPHOCYTES # BLD AUTO: 11 % — LOW (ref 20–55)
MACROCYTES BLD QL: SLIGHT — SIGNIFICANT CHANGE UP
MCHC RBC-ENTMCNC: 25.1 PG — LOW (ref 27–31)
MCHC RBC-ENTMCNC: 28.2 G/DL — LOW (ref 32–36)
MCV RBC AUTO: 88.9 FL — SIGNIFICANT CHANGE UP (ref 81–99)
MICROCYTES BLD QL: SLIGHT — SIGNIFICANT CHANGE UP
MONOCYTES # BLD AUTO: 0.5 K/UL — SIGNIFICANT CHANGE UP (ref 0–0.8)
MONOCYTES NFR BLD AUTO: 6.8 % — SIGNIFICANT CHANGE UP (ref 3–10)
NEUTROPHILS # BLD AUTO: 5.6 K/UL — SIGNIFICANT CHANGE UP (ref 1.8–8)
NEUTROPHILS NFR BLD AUTO: 80.1 % — HIGH (ref 37–73)
OVALOCYTES BLD QL SMEAR: SLIGHT — SIGNIFICANT CHANGE UP
PLAT MORPH BLD: NORMAL — SIGNIFICANT CHANGE UP
PLATELET # BLD AUTO: 502 K/UL — HIGH (ref 150–400)
POIKILOCYTOSIS BLD QL AUTO: SLIGHT — SIGNIFICANT CHANGE UP
POTASSIUM SERPL-MCNC: 4.2 MMOL/L — SIGNIFICANT CHANGE UP (ref 3.5–5.3)
POTASSIUM SERPL-SCNC: 4.2 MMOL/L — SIGNIFICANT CHANGE UP (ref 3.5–5.3)
PROT SERPL-MCNC: 7.9 G/DL — SIGNIFICANT CHANGE UP (ref 6.6–8.7)
RBC # BLD: 3.07 M/UL — LOW (ref 4.4–5.2)
RBC # FLD: 18.5 % — HIGH (ref 11–15.6)
RBC BLD AUTO: ABNORMAL
SODIUM SERPL-SCNC: 135 MMOL/L — SIGNIFICANT CHANGE UP (ref 135–145)
WBC # BLD: 7.1 K/UL — SIGNIFICANT CHANGE UP (ref 4.8–10.8)
WBC # FLD AUTO: 7.1 K/UL — SIGNIFICANT CHANGE UP (ref 4.8–10.8)

## 2018-10-16 PROCEDURE — 71250 CT THORAX DX C-: CPT | Mod: 26

## 2018-10-16 PROCEDURE — 71046 X-RAY EXAM CHEST 2 VIEWS: CPT | Mod: 26

## 2018-10-16 PROCEDURE — 99285 EMERGENCY DEPT VISIT HI MDM: CPT

## 2018-10-16 PROCEDURE — 93010 ELECTROCARDIOGRAM REPORT: CPT

## 2018-10-16 RX ORDER — HEPARIN SODIUM 5000 [USP'U]/ML
5000 INJECTION INTRAVENOUS; SUBCUTANEOUS EVERY 12 HOURS
Qty: 0 | Refills: 0 | Status: DISCONTINUED | OUTPATIENT
Start: 2018-10-16 | End: 2018-10-24

## 2018-10-16 RX ORDER — DOCUSATE SODIUM 100 MG
100 CAPSULE ORAL
Qty: 0 | Refills: 0 | Status: DISCONTINUED | OUTPATIENT
Start: 2018-10-16 | End: 2018-10-24

## 2018-10-16 RX ORDER — FUROSEMIDE 40 MG
40 TABLET ORAL ONCE
Qty: 0 | Refills: 0 | Status: COMPLETED | OUTPATIENT
Start: 2018-10-16 | End: 2018-10-16

## 2018-10-16 RX ORDER — SODIUM CHLORIDE 9 MG/ML
1000 INJECTION, SOLUTION INTRAVENOUS
Qty: 0 | Refills: 0 | Status: DISCONTINUED | OUTPATIENT
Start: 2018-10-16 | End: 2018-10-24

## 2018-10-16 RX ORDER — GLUCAGON INJECTION, SOLUTION 0.5 MG/.1ML
1 INJECTION, SOLUTION SUBCUTANEOUS ONCE
Qty: 0 | Refills: 0 | Status: DISCONTINUED | OUTPATIENT
Start: 2018-10-16 | End: 2018-10-24

## 2018-10-16 RX ORDER — PANTOPRAZOLE SODIUM 20 MG/1
40 TABLET, DELAYED RELEASE ORAL
Qty: 0 | Refills: 0 | Status: DISCONTINUED | OUTPATIENT
Start: 2018-10-16 | End: 2018-10-24

## 2018-10-16 RX ORDER — DEXTROSE 50 % IN WATER 50 %
12.5 SYRINGE (ML) INTRAVENOUS ONCE
Qty: 0 | Refills: 0 | Status: DISCONTINUED | OUTPATIENT
Start: 2018-10-16 | End: 2018-10-24

## 2018-10-16 RX ORDER — FERROUS SULFATE 325(65) MG
325 TABLET ORAL DAILY
Qty: 0 | Refills: 0 | Status: DISCONTINUED | OUTPATIENT
Start: 2018-10-16 | End: 2018-10-18

## 2018-10-16 RX ORDER — INSULIN GLARGINE 100 [IU]/ML
10 INJECTION, SOLUTION SUBCUTANEOUS AT BEDTIME
Qty: 0 | Refills: 0 | Status: DISCONTINUED | OUTPATIENT
Start: 2018-10-16 | End: 2018-10-24

## 2018-10-16 RX ORDER — INSULIN LISPRO 100/ML
VIAL (ML) SUBCUTANEOUS
Qty: 0 | Refills: 0 | Status: DISCONTINUED | OUTPATIENT
Start: 2018-10-16 | End: 2018-10-24

## 2018-10-16 RX ORDER — DEXTROSE 50 % IN WATER 50 %
25 SYRINGE (ML) INTRAVENOUS ONCE
Qty: 0 | Refills: 0 | Status: DISCONTINUED | OUTPATIENT
Start: 2018-10-16 | End: 2018-10-24

## 2018-10-16 RX ORDER — CALCIUM CARBONATE 500(1250)
1 TABLET ORAL THREE TIMES A DAY
Qty: 0 | Refills: 0 | Status: DISCONTINUED | OUTPATIENT
Start: 2018-10-16 | End: 2018-10-24

## 2018-10-16 RX ORDER — INSULIN LISPRO 100/ML
VIAL (ML) SUBCUTANEOUS AT BEDTIME
Qty: 0 | Refills: 0 | Status: DISCONTINUED | OUTPATIENT
Start: 2018-10-16 | End: 2018-10-24

## 2018-10-16 RX ORDER — CARVEDILOL PHOSPHATE 80 MG/1
12.5 CAPSULE, EXTENDED RELEASE ORAL EVERY 12 HOURS
Qty: 0 | Refills: 0 | Status: DISCONTINUED | OUTPATIENT
Start: 2018-10-16 | End: 2018-10-24

## 2018-10-16 RX ORDER — SACUBITRIL AND VALSARTAN 24; 26 MG/1; MG/1
1 TABLET, FILM COATED ORAL
Qty: 0 | Refills: 0 | Status: DISCONTINUED | OUTPATIENT
Start: 2018-10-16 | End: 2018-10-24

## 2018-10-16 RX ORDER — ASPIRIN/CALCIUM CARB/MAGNESIUM 324 MG
81 TABLET ORAL DAILY
Qty: 0 | Refills: 0 | Status: DISCONTINUED | OUTPATIENT
Start: 2018-10-16 | End: 2018-10-24

## 2018-10-16 RX ORDER — DEXTROSE 50 % IN WATER 50 %
15 SYRINGE (ML) INTRAVENOUS ONCE
Qty: 0 | Refills: 0 | Status: DISCONTINUED | OUTPATIENT
Start: 2018-10-16 | End: 2018-10-24

## 2018-10-16 RX ORDER — CALCITRIOL 0.5 UG/1
0.25 CAPSULE ORAL DAILY
Qty: 0 | Refills: 0 | Status: DISCONTINUED | OUTPATIENT
Start: 2018-10-16 | End: 2018-10-24

## 2018-10-16 RX ORDER — SENNA PLUS 8.6 MG/1
2 TABLET ORAL AT BEDTIME
Qty: 0 | Refills: 0 | Status: DISCONTINUED | OUTPATIENT
Start: 2018-10-16 | End: 2018-10-24

## 2018-10-16 RX ADMIN — Medication 40 MILLIGRAM(S): at 21:15

## 2018-10-16 RX ADMIN — Medication 1 TABLET(S): at 23:40

## 2018-10-16 NOTE — ED ADULT NURSE REASSESSMENT NOTE - NS ED NURSE REASSESS COMMENT FT1
Pt. received at 1930 resting in stretcher, found to be 90% on room air, states she wears 2 Liters NC at home. placed on supplemental O2. no respiratory distress noted, respirations even and unlabored. will continue to monitor and re-assess

## 2018-10-16 NOTE — ED STATDOCS - PROGRESS NOTE DETAILS
57 y/o F pt with hx of ESRD, DM, HTN, HLD sent by PMD Dr. Lopez for cough x 4 days. Pt feels tightness in chest and subjective fever and chills. Cough is productive with yellow phlegm and blood tinged. Home nurse examined pt at home and heard abnormal lung sounds and called Dr. Lopez. Dr. Lopez advised her to bring pt to ED. Pt's sugar today was 400.   : Thao  diminished breath sound right lower lobe, scattered rhonchi. left lower lobe with rales  heart regular with systolic ejection murmur, 1+ pedal edema b/l   Pt will be placed in the main ED for complete evaluation by another provider. 57 y/o F pt with hx of ESRD, DM, HTN, HLD sent by PMD for evaluation of cough x 4 days. Pt feels tightness in chest and subjective fever and chills. Cough is productive with yellow phlegm and blood tinged. Visiting nurse examined pt at home and heard abnormal lung sounds and called Dr. Lopez who advised her to bring pt to ED. Pt's sugar today was 400.   : Thao  diminished breath sound right lower lobe, scattered rhonchi. left lower lobe with rales  heart regular with systolic ejection murmur, 1+ pedal edema b/l   Focused evaluation performed at intake to enter orders appropriate for patient's complaint.  Patient placed in the main ED to complete full evaluation by another provider.

## 2018-10-16 NOTE — H&P ADULT - PROBLEM SELECTOR PROBLEM 3
R/O Anemia due to chronic kidney disease, unspecified CKD stage R/O Anemia, unspecified type Anemia, unspecified type

## 2018-10-16 NOTE — H&P ADULT - PROBLEM SELECTOR PLAN 1
> Admit to medicine service under Dr. Iyer  > Bed:  Monitor w/ continuous pulse ox   > Diet:  Renal  > Activity: ambulate as tolerated  > Nursing: vitals per routine  > Oxygen: O2 via NC; keep O2 saturation > 92%  > Labs: CBC, CMP, PT/INR, PTT  > Imaging: CT chest w/o contrast: moderate-severe chf/ fluid overload, moderate R side and small L side pleural effusion, CXR: moderate RS pleural effusion, improved b/l left lower lobe infiltrate R>L.  >EKG pending   > Consults: Nephrology > Admit to medicine service under Dr. Iyer  > Bed:  Monitor w/ continuous pulse ox   > Diet:  Renal  > Activity: ambulate as tolerated  > Nursing: vitals per routine  > Oxygen: O2 via NC; keep O2 saturation > 92%  > Labs: CBC, CMP, PT/INR, PTT, Mg, Phos, blood culture  > Imaging: CT chest w/o contrast: moderate-severe chf/ fluid overload, moderate R side and small L side pleural effusion, CXR: moderate RS pleural effusion, improved b/l left lower lobe infiltrate R>L.  >EKG pending   > Consults: Nephrology > Admit to medicine service under Dr. Iyer. dyspnea is likely from fluid overload from ESRd, Dialysis in am will likely improve pt's symptoms. will repeat cxr after dialysis for rt. pleural effusion which is moderate at this point. clinically pt. not in acute resp. distress.   > Bed:  Monitor w/ continuous pulse ox   > Diet:  Renal  > Activity: ambulate as tolerated  > Nursing: vitals per routine  > Oxygen: O2 via NC; keep O2 saturation > 92%  > Labs: CBC, CMP, PT/INR, PTT, Mg, Phos, blood culture  > Imaging: CT chest w/o contrast: moderate-severe chf/ fluid overload, moderate R side and small L side pleural effusion, CXR: moderate RS pleural effusion, improved b/l left lower lobe infiltrate R>L.  >EKG pending   > Consults: Nephrology > Admit to medicine service under Dr. Iyer. dyspnea is likely from fluid overload from ESRd, Dialysis in am will likely improve pt's symptoms. will repeat cxr after dialysis for rt. pleural effusion which is moderate at this point. clinically pt. not in acute resp. distress.   > Bed:  Monitor w/ continuous pulse ox   > Diet:  Renal  > Activity: ambulate as tolerated  > Nursing: vitals per routine  > Oxygen: O2 via NC; keep O2 saturation > 92%  > Labs: CBC, CMP, PT/INR, PTT, Mg, Phos, blood culture  > Imaging: CT chest w/o contrast: moderate-severe chf/ fluid overload, moderate R side and small L side pleural effusion, CXR: moderate RS pleural effusion, improved b/l left lower lobe infiltrate R>L.  >EKG: no afib  > Consults: Nephrology

## 2018-10-16 NOTE — ED PROVIDER NOTE - CROS ED ENMT ALL NEG
Physical Therapy Daily Progress Note    Subjective   Laura Thomson reports that she is still having some pain in the ankle but it seems to be getting better and it feels more stable     Objective   See Exercise, Manual, and Modality Logs for complete treatment.       Assessment/Plan     Pt is progressing well with therapy, she demonstrates improved balance and stability    Progress strengthening /stabilization /functional activity           Manual Therapy:    14     mins  39275;  Therapeutic Exercise:    40     mins  15862;     Neuromuscular Elvin:        mins  49632;    Therapeutic Activity:          mins  51701;     Gait Training:           mins  38928;     Ultrasound:          mins  62253;    Electrical Stimulation:         mins  48718 ( );  Dry Needling          mins self-pay    Timed Treatment:   54   mins   Total Treatment:     58   mins    Montrell Vazquez, PT  Physical Therapist   negative...

## 2018-10-16 NOTE — ED ADULT NURSE NOTE - OBJECTIVE STATEMENT
Patient was sent to ED by home care nurse for hearing abnormal lung sounds, pt. coughing up yellow phlegm, denies fever, shortness breath, diarrhea, and vomit.

## 2018-10-16 NOTE — H&P ADULT - PROBLEM SELECTOR PLAN 5
-Possible cause of dyspnea   -TTE done on 9/21/18: LVEF 20-25%  -Strict I&O's  -Renal diet  -Continue with Entresto -Possible cause of dyspnea   -TTE done on 9/21/18: LVEF 20-25%  -Strict I&O's  -Renal diet  -Continue with Entresto  cardiology consult, had echo recently, repeat echo if requested by cardio. -Above goal 140/90  -Continue with home medication  -Monitor and evaluate after HD

## 2018-10-16 NOTE — H&P ADULT - HISTORY OF PRESENT ILLNESS
Pt is a 57 Y/O F w/ PMHx of ESRD (on HD MWF), CAD, CHF-systolic, IDDM2, HTN, HLD, paroxysmal Afib, h/o GI bleed, PNA w/ parapneumonic effusion s/p chest tube placement who presents to the ED w/ SOB and productive cough (yellow phlegm w/ streak of blood) X 3 days. Pt was seen by nurse at home today, on examination nurse noted reduced breath sounds on right and advised pt to come in for PNA r/o. Pt reports mild headache and denies any fever or chills. No other complaints. Pt was recently admitted at [facility] in 9/18 for SOB. Pt is a 57 Y/O F w/ PMHx of ESRD (on HD MWF), CAD, CHF-systolic, IDDM2, HTN, HLD, paroxysmal Afib, h/o GI bleed and PNA w/ parapneumonic effusion s/p chest tube placement who presents to the ED w/ SOB and productive cough (yellow phlegm w/ streak of blood) X 3 days. SOB is worse on exertion. Pt was seen by nurse at home today, on examination pt was noted to reduced breath sounds on right and was advised to come in for PNA r/o. Pt reports mild headache and denies any fever or chills. No other complaints. Pt was recently admitted at [facility] in 9/18 for SOB. Pt is a 57 Y/O F w/ PMHx of ESRD (on HD MWF), CAD, CHF-systolic, IDDM2, HTN, HLD, paroxysmal Afib, h/o GI bleed and PNA w/ parapneumonic effusion s/p chest tube placement who presents to the ED w/ SOB and productive cough (yellow phlegm w/ streak of blood) X 3 days. no fever , no chills.  SOB is worse on exertion. Pt was seen by nurse at home today, on examination pt was noted to reduced breath sounds on right and was advised to come in for PNA r/o. Pt reports mild headache and denies any abd. pain. no n/v/d. no dark stools. no blood per rectum. . No other complaints. Pt was recently admitted at [facility] in 9/18 for SOB. pt. is not on long term AC for PAF for h/o gi bleed. Pt is a 57 Y/O F w/ PMHx of ESRD (on HD MWF), CAD, CHF-systolic, IDDM2, HTN, HLD, paroxysmal Afib, h/o GI bleed and PNA w/ parapneumonic effusion s/p chest tube placement who presents to the ED w/ SOB and productive cough some yellow phlegm, streak of blood ?  no fever , no chills.  SOB is worse on exertion. Pt was seen by nurse at home today, on examination pt was noted to reduced breath sounds on right and was advised to come in for PNA r/o. Pt reports mild headache and denies any abd. pain. no n/v/d. no dark stools. no blood per rectum. . No other complaints. Pt was recently admitted at [facility] in 9/18 for SOB. pt. is not on long term AC for PAF for h/o gi bleed.

## 2018-10-16 NOTE — H&P ADULT - NSHPPHYSICALEXAM_GEN_ALL_CORE
VITAL SIGNS   T(C): 36.8 (10-16-18 @ 21:13), Max: 36.8 (10-16-18 @ 13:46)  HR: 90 (10-16-18 @ 21:13) (90 - 93)  BP: 166/95 (10-16-18 @ 21:13) (144/78 - 167/92)  RR: 22 (10-16-18 @ 21:13) (18 - 22)  SpO2: 97% (10-16-18 @ 21:13) (90% - 97%)    GEN - appears age appropriate, well nourished,  pleasant, no distress  HEENT - NCAT, EOMI, PERRLA  NECK - no JVD, No carotid bruit   RESP - Reduced breath sounds b/l R>L, no wheeze, mild crackles b/l, on supplemental O2 via nasal cannula, no use of accessory muscles in breathing   CARDIO - NS1S2, RRR, No m/r/g  GI - Soft, NT, ND, Normal BS x4 quadrants.   Ext- no edema, no cyanosis, no ulcers   MSK - full ROM of BL upper and lower extremities without pain or restriction. BL 5/5 strength on upper and lower extremities.   Neuro - AAO X3, CN 2-12 grossly intact, no focal deficits, cerebellar function intact, no tremor, SOB when walking  Skin - clean, dry, intact. no rashes or lesions.    Psych- normal affect. VITAL SIGNS   T(C): 36.8 (10-16-18 @ 21:13), Max: 36.8 (10-16-18 @ 13:46)  HR: 90 (10-16-18 @ 21:13) (90 - 93)  BP: 166/95 (10-16-18 @ 21:13) (144/78 - 167/92)  RR: 22 (10-16-18 @ 21:13) (18 - 22)  SpO2: 97% (10-16-18 @ 21:13) (90% - 97%)    GEN - appears age appropriate, well nourished,  pleasant, no distress  HEENT - NCAT, EOMI, PERRLA  NECK - no JVD, No carotid bruit   RESP - Reduced breath sounds b/l R>L, no wheeze, mild crackles b/l, on supplemental O2 via nasal cannula, no use of accessory muscles in breathing   CARDIO - NS1S2, RRR, No m/r/g  GI - Soft, NT, ND, BS +, no guarding, rigidity or rebound tenderness  Ext- no edema, no cyanosis, no ulcers   MSK - full ROM of BL upper and lower extremities without pain or restriction. BL 5/5 strength on upper and lower extremities.   Neuro - AAO X3, CN 2-12 grossly intact, no focal deficits, cerebellar function intact, no tremor, SOB when walking  Skin - clean, dry, intact. no rashes or lesions.    Psych- normal affect. VITAL SIGNS   T(C): 36.8 (10-16-18 @ 21:13), Max: 36.8 (10-16-18 @ 13:46)  HR: 90 (10-16-18 @ 21:13) (90 - 93)  BP: 166/95 (10-16-18 @ 21:13) (144/78 - 167/92)  RR: 22 (10-16-18 @ 21:13) (18 - 22)  SpO2: 97% (10-16-18 @ 21:13) (90% - 97%)    GEN - appears age appropriate, well nourished,  pleasant, no distress  HEENT - NCAT, EOMI, PERRLA  NECK - no JVD, No carotid bruit   RESP - Reduced breath sounds b/l R>L, no wheeze, mild crackles b/l, on supplemental O2 via nasal cannula, no use of accessory muscles in breathing   CARDIO - NS1S2, RRR, No m/r/g  GI - Soft, NT, ND, BS +, no guarding, rigidity or rebound tenderness  Ext- no edema, no cyanosis, no ulcers   MSK - full ROM of BL upper and lower extremities without pain or restriction. BL 5/5 strength on upper and lower extremities.   Neuro - AAO X3, CN 2-12 grossly intact, no focal deficits, cerebellar function intact, no tremor.   Skin - clean, dry, intact. no rashes or lesions.    Psych- normal affect. VITAL SIGNS   T(C): 36.8 (10-16-18 @ 21:13), Max: 36.8 (10-16-18 @ 13:46)  HR: 90 (10-16-18 @ 21:13) (90 - 93)  BP: 166/95 (10-16-18 @ 21:13) (144/78 - 167/92)  RR: 22 (10-16-18 @ 21:13) (18 - 22)  SpO2: 97% (10-16-18 @ 21:13) (90% - 97%)    GEN - appears age appropriate, well nourished,  pleasant, no distress  HEENT - NCAT, EOMI, PERRLA  NECK - no JVD, No carotid bruit   RESP - Reduced breath sounds b/l R>L, no wheeze, mild crackles b/l, on supplemental O2 via nasal cannula, no use of accessory muscles in breathing   CARDIO - NS1S2, RRR, No m/r/g  GI - Soft, NT, ND, BS +, no guarding, rigidity or rebound tenderness  Ext- no edema, no cyanosis, no ulcers, left arm fistula w/ thrill   MSK - full ROM of BL upper and lower extremities without pain or restriction. BL 5/5 strength on upper and lower extremities.   Neuro - AAO X3, CN 2-12 grossly intact, no focal deficits, cerebellar function intact, no tremor.   Skin - clean, dry, intact. no rashes or lesions.    Psych- normal affect.

## 2018-10-16 NOTE — H&P ADULT - PROBLEM SELECTOR PLAN 3
-Likely Anemia of chronic disease   -Hb 7.7, baseline 8.8-10  -Asx, no bleeding  -F/u CBC, CMP am   -If transfusion needed, will be done with HD tomorrow  -Continue with ferrous sulfate   -Type and screen ordered -Likely Anemia of chronic disease   -Hb 7.7, baseline 8.8, no over bleed, will send stool for occult blood and anemia work up.   -Asx, no bleeding  -F/u CBC, CMP am   hematology consult. dr. tracy.   -If transfusion needed, will be done with HD tomorrow  -Continue with ferrous sulfate   -Type and screen ordered -Likely Anemia of chronic disease   -Hb 7.7, baseline 8.8, no over bleed, will send stool for occult blood and anemia work up.   -Asx, no bleeding  -F/u CBC, CMP, Anemia panel am   -hematology consult. dr. tracy.   -If transfusion needed, will be done with HD tomorrow  -Continue with ferrous sulfate   -Type and screen ordered

## 2018-10-16 NOTE — ED PROVIDER NOTE - OBJECTIVE STATEMENT
58 year old female with PMh ESRD on HD, HTN, HLD, DM presents with SOB. Pt states that the Sx started 3 days ago as SOB, constant and worse with exertion. No chest pain, but there is associated orthopnea and a productive cough. The pt went to see her pmd Dr. Lopez, who told her that she had abnormal lung sounds and to come to the ED.

## 2018-10-16 NOTE — H&P ADULT - PROBLEM SELECTOR PROBLEM 7
Type 2 diabetes mellitus with complication, with long-term current use of insulin Coronary artery disease, angina presence unspecified, unspecified vessel or lesion type, unspecified whether native or transplanted heart

## 2018-10-16 NOTE — H&P ADULT - ASSESSMENT
Pt has FHx of kidney disease in brother.     Pt is a 59 Y/O F w/ PMHx of ESRD (on HD MWF), CAD, CHF-systolic, IDDM2, HTN, HLD, paroxysmal Afib, h/o GI bleed and PNA w/ parapneumonic effusion s/p chest tube placement w/ c/o SOB on exertion and productive cough, admitted for dyspnea 2/2 fluid overload 2/2 ESRD. Pt is scheduled for HD tomorrow. CT chest on admission shows moderate-severe chf/ fluid overload, moderate R side and small L side pleural effusion. CXR shows moderate RS pleural effusion, improved b/l left lower lobe infiltrate R>L. Nephrology consulted.       Code status: Full code Pt has FHx of kidney disease in brother.     Pt is a 57 Y/O F w/ PMHx of ESRD (on HD MWF), CAD, CHF-systolic, IDDM2, HTN, HLD, paroxysmal Afib, h/o GI bleed and PNA w/ parapneumonic effusion s/p chest tube placement in august 2018,  w/ c/o SOB on exertion and productive cough, admitted for dyspnea 2/2 fluid overload 2/2 ESRD. Pt is scheduled for HD tomorrow. CT chest on admission shows moderate-severe chf/ fluid overload, moderate R side and small L side pleural effusion. CXR shows moderate RS pleural effusion, improved b/l left lower lobe infiltrate R>L. Nephrology consulted.       Code status: Full code

## 2018-10-16 NOTE — ED ADULT TRIAGE NOTE - CHIEF COMPLAINT QUOTE
Patient states that she has a nurse that comes to the house. Nurse advised patient to come to the ED to r/o pneumonia. Per home nurse patient has diminished breath sounds on the right side. Patient also has a productive cough with yellow sputum since Saturday. Denies any fevers.

## 2018-10-16 NOTE — ED ADULT NURSE REASSESSMENT NOTE - NS ED NURSE REASSESS COMMENT FT1
Discussed with MD Iyer pt. Hgb of 7.7, informed RN not concerned at this time. pt. remains asymptomatic. will repeat blood work after dialysis. Pt. placed on cardiac monitor and transferred to CDU side

## 2018-10-16 NOTE — H&P ADULT - PROBLEM SELECTOR PLAN 7
-Continue with lantus  -HISS -Continue with lantus  -HISS  -HbA1c 7 (08/18) -Stable  -Continue with ASA, Coreg, Entresto

## 2018-10-16 NOTE — H&P ADULT - PROBLEM SELECTOR PLAN 6
-Above goal 140/90  -Continue with home medication  -Monitor and evaluate after HD -TTE done on 9/21/18: LVEF 20-25%  -Strict I&O's  -Renal diet  -Continue with Entresto  -Cardiology consult, had echo recently, repeat echo if requested by cardio.

## 2018-10-16 NOTE — ED ADULT NURSE NOTE - NSIMPLEMENTINTERV_GEN_ALL_ED
Implemented All Universal Safety Interventions:  [place] to call system. Call bell, personal items and telephone within reach. Instruct patient to call for assistance. Room bathroom lighting operational. Non-slip footwear when patient is off stretcher. Physically safe environment: no spills, clutter or unnecessary equipment. Stretcher in lowest position, wheels locked, appropriate side rails in place.

## 2018-10-16 NOTE — H&P ADULT - PROBLEM SELECTOR PLAN 8
-Stable  -Continue with coreg   -Not on AC -Stable  -Continue with coreg   on aspirin. -Stable  -Continue with coreg   -On aspirin.

## 2018-10-16 NOTE — H&P ADULT - PROBLEM SELECTOR PROBLEM 4
Coronary artery disease, angina presence unspecified, unspecified vessel or lesion type, unspecified whether native or transplanted heart R/O Type 2 diabetes mellitus with hyperglycemia, with long-term current use of insulin

## 2018-10-16 NOTE — H&P ADULT - NSHPREVIEWOFSYSTEMS_GEN_ALL_CORE
Denies CP, SOB, palpitations, abdominal pain, diarrhea, constipation, melena, hematochezia, dysuria. Pt reports that she produces very small amount of urine. Denies n/v, CP, palpitations, abdominal pain, diarrhea, constipation, melena, hematochezia, dysuria. Pt reports that she produces very small amount of urine.

## 2018-10-16 NOTE — H&P ADULT - NSHPLABSRESULTS_GEN_ALL_CORE
7.7    7.1   )-----------( 502      ( 16 Oct 2018 15:40 )             27.3       10-16    135  |  91<L>  |  34.0<H>  ----------------------------<  396<H>  4.2   |  28.0  |  3.90<H>    Ca    8.5<L>      16 Oct 2018 15:40    TPro  7.9  /  Alb  3.3  /  TBili  1.0  /  DBili  x   /  AST  17  /  ALT  11  /  AlkPhos  135<H>  10-1    < from: Xray Chest 2 Views PA/Lat (10.16.18 @ 15:39) >      IMPRESSION:    Moderate right-sided pleural effusion, unchanged. Improved bilateral   lower lobe infiltrates, right worse then left.    < end of copied text >    < from: CT Chest No Cont (10.16.18 @ 16:14) >    IMPRESSION:      Moderate-severe CHF/fluid overload with marked cardiomegaly,   interstitial/pulmonary alveolar edema, moderate right and small left   pleural effusions, and anasarca.    Complete passive atelectasis of the right lower lobe. Minimal left   basilar passive atelectasis.    Interval development of chronic scarring of the right middle lobe and   left lingula with associated traction bronchiectasis suggesting sequela   of mycobacterium avium complex infection.    Small amount of of upper abdominal ascites.    < end of copied text >

## 2018-10-16 NOTE — H&P ADULT - PMH
Anemia due to chronic kidney disease, unspecified CKD stage    Chronic systolic congestive heart failure    Coronary artery disease, angina presence unspecified, unspecified vessel or lesion type, unspecified whether native or transplanted heart    DM (diabetes mellitus)    End stage renal disease on dialysis    HLD (hyperlipidemia)    HTN (hypertension)    Paroxysmal atrial fibrillation    Pericardial effusion    Pleural effusion

## 2018-10-17 DIAGNOSIS — D64.9 ANEMIA, UNSPECIFIED: ICD-10-CM

## 2018-10-17 LAB
ALBUMIN SERPL ELPH-MCNC: 3.1 G/DL — LOW (ref 3.3–5.2)
ALP SERPL-CCNC: 114 U/L — SIGNIFICANT CHANGE UP (ref 40–120)
ALT FLD-CCNC: 10 U/L — SIGNIFICANT CHANGE UP
ANION GAP SERPL CALC-SCNC: 15 MMOL/L — SIGNIFICANT CHANGE UP (ref 5–17)
ANISOCYTOSIS BLD QL: SLIGHT — SIGNIFICANT CHANGE UP
APTT BLD: 26.6 SEC — LOW (ref 27.5–37.4)
AST SERPL-CCNC: 12 U/L — SIGNIFICANT CHANGE UP
BASOPHILS # BLD AUTO: 0 K/UL — SIGNIFICANT CHANGE UP (ref 0–0.2)
BASOPHILS NFR BLD AUTO: 0.3 % — SIGNIFICANT CHANGE UP (ref 0–2)
BILIRUB SERPL-MCNC: 0.8 MG/DL — SIGNIFICANT CHANGE UP (ref 0.4–2)
BLD GP AB SCN SERPL QL: SIGNIFICANT CHANGE UP
BUN SERPL-MCNC: 40 MG/DL — HIGH (ref 8–20)
CALCIUM SERPL-MCNC: 8.8 MG/DL — SIGNIFICANT CHANGE UP (ref 8.6–10.2)
CHLORIDE SERPL-SCNC: 96 MMOL/L — LOW (ref 98–107)
CO2 SERPL-SCNC: 27 MMOL/L — SIGNIFICANT CHANGE UP (ref 22–29)
CREAT SERPL-MCNC: 4.36 MG/DL — HIGH (ref 0.5–1.3)
EOSINOPHIL # BLD AUTO: 0.2 K/UL — SIGNIFICANT CHANGE UP (ref 0–0.5)
EOSINOPHIL NFR BLD AUTO: 1.8 % — SIGNIFICANT CHANGE UP (ref 0–6)
FERRITIN SERPL-MCNC: 1022 NG/ML — HIGH (ref 15–150)
GLUCOSE BLDC GLUCOMTR-MCNC: 150 MG/DL — HIGH (ref 70–99)
GLUCOSE BLDC GLUCOMTR-MCNC: 209 MG/DL — HIGH (ref 70–99)
GLUCOSE BLDC GLUCOMTR-MCNC: 233 MG/DL — HIGH (ref 70–99)
GLUCOSE SERPL-MCNC: 178 MG/DL — HIGH (ref 70–115)
HCT VFR BLD CALC: 23.5 % — LOW (ref 37–47)
HGB BLD-MCNC: 6.7 G/DL — CRITICAL LOW (ref 12–16)
HYPOCHROMIA BLD QL: SLIGHT — SIGNIFICANT CHANGE UP
INR BLD: 1.16 RATIO — SIGNIFICANT CHANGE UP (ref 0.88–1.16)
IRON SATN MFR SERPL: 12 % — LOW (ref 14–50)
IRON SATN MFR SERPL: 25 UG/DL — LOW (ref 37–145)
LYMPHOCYTES # BLD AUTO: 1.2 K/UL — SIGNIFICANT CHANGE UP (ref 1–4.8)
LYMPHOCYTES # BLD AUTO: 12.4 % — LOW (ref 20–55)
MACROCYTES BLD QL: SLIGHT — SIGNIFICANT CHANGE UP
MAGNESIUM SERPL-MCNC: 2 MG/DL — SIGNIFICANT CHANGE UP (ref 1.6–2.6)
MCHC RBC-ENTMCNC: 24.8 PG — LOW (ref 27–31)
MCHC RBC-ENTMCNC: 28.5 G/DL — LOW (ref 32–36)
MCV RBC AUTO: 87 FL — SIGNIFICANT CHANGE UP (ref 81–99)
MICROCYTES BLD QL: SLIGHT — SIGNIFICANT CHANGE UP
MONOCYTES # BLD AUTO: 0.4 K/UL — SIGNIFICANT CHANGE UP (ref 0–0.8)
MONOCYTES NFR BLD AUTO: 4.8 % — SIGNIFICANT CHANGE UP (ref 3–10)
NEUTROPHILS # BLD AUTO: 7.5 K/UL — SIGNIFICANT CHANGE UP (ref 1.8–8)
NEUTROPHILS NFR BLD AUTO: 79.4 % — HIGH (ref 37–73)
PHOSPHATE SERPL-MCNC: 2.2 MG/DL — LOW (ref 2.4–4.7)
PLAT MORPH BLD: NORMAL — SIGNIFICANT CHANGE UP
PLATELET # BLD AUTO: 426 K/UL — HIGH (ref 150–400)
POIKILOCYTOSIS BLD QL AUTO: SLIGHT — SIGNIFICANT CHANGE UP
POLYCHROMASIA BLD QL SMEAR: SLIGHT — SIGNIFICANT CHANGE UP
POTASSIUM SERPL-MCNC: 3.8 MMOL/L — SIGNIFICANT CHANGE UP (ref 3.5–5.3)
POTASSIUM SERPL-SCNC: 3.8 MMOL/L — SIGNIFICANT CHANGE UP (ref 3.5–5.3)
PROT SERPL-MCNC: 7.3 G/DL — SIGNIFICANT CHANGE UP (ref 6.6–8.7)
PROTHROM AB SERPL-ACNC: 12.8 SEC — HIGH (ref 9.8–12.7)
RBC # BLD: 2.7 M/UL — LOW (ref 4.4–5.2)
RBC # BLD: 2.7 M/UL — LOW (ref 4.4–5.2)
RBC # FLD: 18.8 % — HIGH (ref 11–15.6)
RBC BLD AUTO: ABNORMAL
RETICS #: 12.4 K/UL — LOW (ref 25–125)
RETICS/RBC NFR: 4.6 % — HIGH (ref 0.5–2.5)
SODIUM SERPL-SCNC: 138 MMOL/L — SIGNIFICANT CHANGE UP (ref 135–145)
TIBC SERPL-MCNC: 213 UG/DL — LOW (ref 220–430)
TRANSFERRIN SERPL-MCNC: 149 MG/DL — LOW (ref 192–382)
TYPE + AB SCN PNL BLD: SIGNIFICANT CHANGE UP
WBC # BLD: 9.4 K/UL — SIGNIFICANT CHANGE UP (ref 4.8–10.8)
WBC # FLD AUTO: 9.4 K/UL — SIGNIFICANT CHANGE UP (ref 4.8–10.8)

## 2018-10-17 PROCEDURE — 99233 SBSQ HOSP IP/OBS HIGH 50: CPT

## 2018-10-17 PROCEDURE — 99223 1ST HOSP IP/OBS HIGH 75: CPT

## 2018-10-17 RX ORDER — INFLUENZA VIRUS VACCINE 15; 15; 15; 15 UG/.5ML; UG/.5ML; UG/.5ML; UG/.5ML
0.5 SUSPENSION INTRAMUSCULAR ONCE
Qty: 0 | Refills: 0 | Status: COMPLETED | OUTPATIENT
Start: 2018-10-17 | End: 2018-10-17

## 2018-10-17 RX ORDER — CHLORHEXIDINE GLUCONATE 213 G/1000ML
1 SOLUTION TOPICAL
Qty: 0 | Refills: 0 | Status: DISCONTINUED | OUTPATIENT
Start: 2018-10-17 | End: 2018-10-24

## 2018-10-17 RX ORDER — IPRATROPIUM/ALBUTEROL SULFATE 18-103MCG
3 AEROSOL WITH ADAPTER (GRAM) INHALATION EVERY 6 HOURS
Qty: 0 | Refills: 0 | Status: DISCONTINUED | OUTPATIENT
Start: 2018-10-17 | End: 2018-10-21

## 2018-10-17 RX ORDER — ERYTHROPOIETIN 10000 [IU]/ML
10000 INJECTION, SOLUTION INTRAVENOUS; SUBCUTANEOUS
Qty: 0 | Refills: 0 | Status: DISCONTINUED | OUTPATIENT
Start: 2018-10-17 | End: 2018-10-24

## 2018-10-17 RX ADMIN — Medication 4: at 13:30

## 2018-10-17 RX ADMIN — Medication 325 MILLIGRAM(S): at 14:49

## 2018-10-17 RX ADMIN — CARVEDILOL PHOSPHATE 12.5 MILLIGRAM(S): 80 CAPSULE, EXTENDED RELEASE ORAL at 21:10

## 2018-10-17 RX ADMIN — PANTOPRAZOLE SODIUM 40 MILLIGRAM(S): 20 TABLET, DELAYED RELEASE ORAL at 10:10

## 2018-10-17 RX ADMIN — Medication 1 TABLET(S): at 23:45

## 2018-10-17 RX ADMIN — Medication 1 TABLET(S): at 05:28

## 2018-10-17 RX ADMIN — Medication 3 MILLILITER(S): at 09:17

## 2018-10-17 RX ADMIN — Medication 3 MILLILITER(S): at 03:30

## 2018-10-17 RX ADMIN — Medication 3 MILLILITER(S): at 15:55

## 2018-10-17 RX ADMIN — Medication 81 MILLIGRAM(S): at 14:49

## 2018-10-17 RX ADMIN — INSULIN GLARGINE 10 UNIT(S): 100 INJECTION, SOLUTION SUBCUTANEOUS at 23:48

## 2018-10-17 RX ADMIN — HEPARIN SODIUM 5000 UNIT(S): 5000 INJECTION INTRAVENOUS; SUBCUTANEOUS at 05:27

## 2018-10-17 RX ADMIN — CARVEDILOL PHOSPHATE 12.5 MILLIGRAM(S): 80 CAPSULE, EXTENDED RELEASE ORAL at 05:28

## 2018-10-17 RX ADMIN — Medication 4: at 10:05

## 2018-10-17 RX ADMIN — SACUBITRIL AND VALSARTAN 1 TABLET(S): 24; 26 TABLET, FILM COATED ORAL at 05:28

## 2018-10-17 RX ADMIN — ERYTHROPOIETIN 10000 UNIT(S): 10000 INJECTION, SOLUTION INTRAVENOUS; SUBCUTANEOUS at 20:15

## 2018-10-17 RX ADMIN — Medication 1 TABLET(S): at 14:49

## 2018-10-17 NOTE — PROGRESS NOTE ADULT - SUBJECTIVE AND OBJECTIVE BOX
JAROCHO MORALES  ----------------------------------------  The patient was seen and evaluated for anemia.  The patient is in no acute distress.  Denied any chest pain, palpitations, or abdominal pain.  Some dyspnea.    Vital Signs Last 24 Hrs  T(C): 36.8 (17 Oct 2018 07:34), Max: 36.9 (17 Oct 2018 04:36)  T(F): 98.2 (17 Oct 2018 07:34), Max: 98.4 (17 Oct 2018 04:36)  HR: 78 (17 Oct 2018 09:31) (78 - 96)  BP: 152/85 (17 Oct 2018 07:34) (144/78 - 167/92)  BP(mean): --  RR: 20 (17 Oct 2018 07:34) (18 - 22)  SpO2: 95% (17 Oct 2018 09:31) (90% - 97%)    CAPILLARY BLOOD GLUCOSE  POCT Blood Glucose.: 233 mg/dL (17 Oct 2018 09:11)  POCT Blood Glucose.: 350 mg/dL (16 Oct 2018 23:44)    PHYSICAL EXAMINATION:  ----------------------------------------  General appearance: No acute distress, Awake, Alert  HEENT: Normocephalic, Atraumatic, Conjunctiva clear, EOMI  Neck: Supple, No JVD, No tenderness  Lungs: Clear to auscultation, Breath sound equal bilaterally, No wheezes, Mild rales  Cardiovascular: S1S2, Regular rhythm  Abdomen: Soft, Nontender, Nondistended, No guarding/rebound, Positive bowel sounds  Extremities: No clubbing, No cyanosis, No edema, No calf tenderness, Left upper extremity AV fistula  Neuro: Strength equal bilaterally, No tremors  Psychiatric: Appropriate mood, Normal affect    LABORATORY STUDIES:  ----------------------------------------             6.7    9.4   )-----------( 426      ( 17 Oct 2018 06:10 )             23.5     10-17    138  |  96<L>  |  40.0<H>  ----------------------------<  178<H>  3.8   |  27.0  |  4.36<H>    Ca    8.8      17 Oct 2018 06:10  Phos  2.2     10-17  Mg     2.0     10-17    TPro  7.3  /  Alb  3.1<L>  /  TBili  0.8  /  DBili  x   /  AST  12  /  ALT  10  /  AlkPhos  114  10-17    LIVER FUNCTIONS - ( 17 Oct 2018 06:10 )  Alb: 3.1 g/dL / Pro: 7.3 g/dL / ALK PHOS: 114 U/L / ALT: 10 U/L / AST: 12 U/L / GGT: x           PT/INR - ( 17 Oct 2018 06:10 )   PT: 12.8 sec;   INR: 1.16 ratio    PTT - ( 17 Oct 2018 06:10 )  PTT:26.6 sec    MEDICATIONS  (STANDING):  ALBUTerol/ipratropium for Nebulization 3 milliLiter(s) Nebulizer every 6 hours  aspirin enteric coated 81 milliGRAM(s) Oral daily  calcitriol   Capsule 0.25 MICROGram(s) Oral daily  calcium carbonate   1250 mG (OsCal) 1 Tablet(s) Oral three times a day  carvedilol 12.5 milliGRAM(s) Oral every 12 hours  dextrose 5%. 1000 milliLiter(s) (50 mL/Hr) IV Continuous <Continuous>  dextrose 50% Injectable 12.5 Gram(s) IV Push once  dextrose 50% Injectable 25 Gram(s) IV Push once  dextrose 50% Injectable 25 Gram(s) IV Push once  ferrous sulfate Oral Tab/Cap - Peds 325 milliGRAM(s) Oral daily  heparin  Injectable 5000 Unit(s) SubCutaneous every 12 hours  influenza   Vaccine 0.5 milliLiter(s) IntraMuscular once  insulin glargine Injectable (LANTUS) 10 Unit(s) SubCutaneous at bedtime  insulin lispro (HumaLOG) corrective regimen sliding scale   SubCutaneous three times a day before meals  insulin lispro (HumaLOG) corrective regimen sliding scale   SubCutaneous at bedtime  pantoprazole    Tablet 40 milliGRAM(s) Oral before breakfast  sacubitril 97 mG/valsartan 103 mG 1 Tablet(s) Oral two times a day    MEDICATIONS  (PRN):  dextrose 40% Gel 15 Gram(s) Oral once PRN Blood Glucose LESS THAN 70 milliGRAM(s)/deciliter  docusate sodium 100 milliGRAM(s) Oral two times a day PRN Constipation  glucagon  Injectable 1 milliGRAM(s) IntraMuscular once PRN Glucose LESS THAN 70 milligrams/deciliter  senna 2 Tablet(s) Oral at bedtime PRN Constipation      ASSESSMENT / PLAN:  ----------------------------------------  Anemia - Discussed with Hematology. For transfusion of packed red blood cells.    ESRD - Discussed with Nephrology, for hemodialysis    Heart failure - On carvedilol and sacubitril/valsartan. Cardiology consultation noted.    Diabetes - Insulin coverage, close monitoring of blood glucose levels.    Atrial fibrillation - On aspirin.    Hypertension - Close blood pressure monitoring. JAROCHO MORALES  ----------------------------------------  The patient was seen and evaluated for anemia.  The patient is in no acute distress.  Denied any chest pain, palpitations, or abdominal pain.  Some dyspnea.    Vital Signs Last 24 Hrs  T(C): 36.8 (17 Oct 2018 07:34), Max: 36.9 (17 Oct 2018 04:36)  T(F): 98.2 (17 Oct 2018 07:34), Max: 98.4 (17 Oct 2018 04:36)  HR: 78 (17 Oct 2018 09:31) (78 - 96)  BP: 152/85 (17 Oct 2018 07:34) (144/78 - 167/92)  BP(mean): --  RR: 20 (17 Oct 2018 07:34) (18 - 22)  SpO2: 95% (17 Oct 2018 09:31) (90% - 97%)    CAPILLARY BLOOD GLUCOSE  POCT Blood Glucose.: 233 mg/dL (17 Oct 2018 09:11)  POCT Blood Glucose.: 350 mg/dL (16 Oct 2018 23:44)    PHYSICAL EXAMINATION:  ----------------------------------------  General appearance: No acute distress, Awake, Alert  HEENT: Normocephalic, Atraumatic, Conjunctiva clear, EOMI  Neck: Supple, No JVD, No tenderness  Lungs: Clear to auscultation, Breath sound equal bilaterally, No wheezes, Mild rales  Cardiovascular: S1S2, Regular rhythm  Abdomen: Soft, Nontender, Nondistended, No guarding/rebound, Positive bowel sounds  Extremities: No clubbing, No cyanosis, No edema, No calf tenderness, Left upper extremity AV fistula  Neuro: Strength equal bilaterally, No tremors  Psychiatric: Appropriate mood, Normal affect    LABORATORY STUDIES:  ----------------------------------------             6.7    9.4   )-----------( 426      ( 17 Oct 2018 06:10 )             23.5     10-17    138  |  96<L>  |  40.0<H>  ----------------------------<  178<H>  3.8   |  27.0  |  4.36<H>    Ca    8.8      17 Oct 2018 06:10  Phos  2.2     10-17  Mg     2.0     10-17    TPro  7.3  /  Alb  3.1<L>  /  TBili  0.8  /  DBili  x   /  AST  12  /  ALT  10  /  AlkPhos  114  10-17    LIVER FUNCTIONS - ( 17 Oct 2018 06:10 )  Alb: 3.1 g/dL / Pro: 7.3 g/dL / ALK PHOS: 114 U/L / ALT: 10 U/L / AST: 12 U/L / GGT: x           PT/INR - ( 17 Oct 2018 06:10 )   PT: 12.8 sec;   INR: 1.16 ratio    PTT - ( 17 Oct 2018 06:10 )  PTT:26.6 sec    MEDICATIONS  (STANDING):  ALBUTerol/ipratropium for Nebulization 3 milliLiter(s) Nebulizer every 6 hours  aspirin enteric coated 81 milliGRAM(s) Oral daily  calcitriol   Capsule 0.25 MICROGram(s) Oral daily  calcium carbonate   1250 mG (OsCal) 1 Tablet(s) Oral three times a day  carvedilol 12.5 milliGRAM(s) Oral every 12 hours  dextrose 5%. 1000 milliLiter(s) (50 mL/Hr) IV Continuous <Continuous>  dextrose 50% Injectable 12.5 Gram(s) IV Push once  dextrose 50% Injectable 25 Gram(s) IV Push once  dextrose 50% Injectable 25 Gram(s) IV Push once  ferrous sulfate Oral Tab/Cap - Peds 325 milliGRAM(s) Oral daily  heparin  Injectable 5000 Unit(s) SubCutaneous every 12 hours  influenza   Vaccine 0.5 milliLiter(s) IntraMuscular once  insulin glargine Injectable (LANTUS) 10 Unit(s) SubCutaneous at bedtime  insulin lispro (HumaLOG) corrective regimen sliding scale   SubCutaneous three times a day before meals  insulin lispro (HumaLOG) corrective regimen sliding scale   SubCutaneous at bedtime  pantoprazole    Tablet 40 milliGRAM(s) Oral before breakfast  sacubitril 97 mG/valsartan 103 mG 1 Tablet(s) Oral two times a day    MEDICATIONS  (PRN):  dextrose 40% Gel 15 Gram(s) Oral once PRN Blood Glucose LESS THAN 70 milliGRAM(s)/deciliter  docusate sodium 100 milliGRAM(s) Oral two times a day PRN Constipation  glucagon  Injectable 1 milliGRAM(s) IntraMuscular once PRN Glucose LESS THAN 70 milligrams/deciliter  senna 2 Tablet(s) Oral at bedtime PRN Constipation      ASSESSMENT / PLAN:  ----------------------------------------  Anemia - Discussed with Hematology. For transfusion of packed red blood cells.    ESRD - Discussed with Nephrology, for hemodialysis    Heart failure - On carvedilol and sacubitril/valsartan. Cardiology consultation noted.    Diabetes - Insulin coverage, close monitoring of blood glucose levels.    Atrial fibrillation - On aspirin.    Hypertension - Close blood pressure monitoring.    Staff  assisted with interview.

## 2018-10-17 NOTE — CONSULT NOTE ADULT - ATTENDING COMMENTS
Patient well known to me from prior admissions. Interview with help of     58F hx DVT, s/p IVC filter, NICM (Cath 3/2018),  HFrEF with worsened EF Echo 8/31/2018: EF 20-25%, moderate diastolic dysfunction, moderate MR, moderate TR, pericardial effusion s/p pericardiocentesis, pericarditis,  pleural effusion s/p thoracentesis, paroxysmal atrial fibrillation not on anticoagulation due to major GIB on AC, ESRD on HD, anemia of chronic disease presented with 1 week hx of productive cough/fevers/chills/dyspnea.  Compliant with meds and hemodialysis sessions.  Hx of non-compliance with low salt diet.  CXR and CTA chest shows pulmonary edema, bilateral pleural effusions.     # NICM HFrEF LVEF 20-25% - decompensated, wet and warm. Volume removal with HD.  Multiple readmissions for decompensated systolic heart failure.  Continue carvedilol, entresto.  Avoid aldosterone inhibitor due to renal failure.  May be ICD candidate for primary prevention if LVEF remains severely depressed by December 2018. Daily standing weights.   # paroxysmal atrial fibrillation - failed AC in setting of major GI bleed, currently off AC.  Was previously referred for Watchman, missed appointments in the office.  Continue carvedilol.  # ESRD - HD as scheduled  # pleural effusions - R > L, monitor, if dyspnea persists despite volume removal, ABx, may need therapeutic thoracentesis of R sided pleural effusion  # hx of pericardial effusion s/p pericardiocentesis - with nausea/subjective fevers/chills.  May repeat TTE to assess for pericardial effusion.   # LMWH for DVT prophylaxis    D/W Dr. Mensah  Will follow.    Thank you for allowing me to participate in your patient's care. Patient well known to me from prior admissions. Interview with help of     58F hx DVT, s/p IVC filter, NICM (Cath 3/2018),  HFrEF with worsened EF Echo 8/31/2018: EF 20-25%, moderate diastolic dysfunction, moderate MR, moderate TR, pericardial effusion s/p pericardiocentesis, pericarditis,  pleural effusion s/p thoracentesis, paroxysmal atrial fibrillation not on anticoagulation due to major GIB on AC, ESRD on HD, anemia of chronic disease presented with 1 week hx of productive cough/fevers/chills/dyspnea.  Compliant with meds and hemodialysis sessions.  Hx of non-compliance with low salt diet.  CXR and CTA chest shows pulmonary edema, bilateral pleural effusions.     # NICM HFrEF LVEF 20-25% - decompensated, wet and warm. Volume removal with HD.  Multiple readmissions for decompensated systolic heart failure.  Continue carvedilol, entresto.  Avoid aldosterone inhibitor due to renal failure.  May be ICD candidate for primary prevention if LVEF remains severely depressed by December 2018. Daily standing weights.   # paroxysmal atrial fibrillation - failed AC in setting of major GI bleed, currently off AC.  Was previously referred for Watchman, missed appointments in the office.  Continue carvedilol.  # ESRD - HD as scheduled  # pleural effusions - R > L, monitor, if dyspnea persists despite volume removal, ABx, may need therapeutic thoracentesis of R sided pleural effusion  # hx of pericardial effusion s/p pericardiocentesis - with nausea/subjective fevers/chills.  May repeat TTE to assess for pericardial effusion.   # anemia - no overt bleeding, ?anemia of chronic disease, Agree with keeping Hb > 8.    # LMWH for DVT prophylaxis    D/W Dr. Mensah  Will follow.    Thank you for allowing me to participate in your patient's care.

## 2018-10-17 NOTE — CONSULT NOTE ADULT - ASSESSMENT
CHF/Pleffusions causing SOB  AFib - off AC  Anemia -Hb decreasing 7.7-> 6.7; Stool OB to check- Has known hx GIB   ESRD - HD MWF ; Arranged ; can get PRBC on HD  Tsat low ; Add venofer if Fe low and add  PRocrit

## 2018-10-17 NOTE — CONSULT NOTE ADULT - SUBJECTIVE AND OBJECTIVE BOX
Consult requested by:  Dr Lloyd    Reason for Consultation: CHF    History obtained by: Patient and medical record     obtained: Yes    Chief complaint:  SOB    HPI:  Pt is a 59 Y/O F w/ PMHx of ESRD (on HD MWF), CAD, CHF-systolic, IDDM2, HTN, HLD, paroxysmal Afib, h/o GI bleed and PNA w/ parapneumonic effusion s/p chest tube placement who presents to the ED w/ SOB and productive cough (yellow phlegm w/ streak of blood) X 3 days. no fever , no chills.  SOB is worse on exertion. Pt was seen by nurse at home today, on examination pt was noted to reduced breath sounds on right and was advised to come in for PNA r/o. Pt reports mild headache and denies any abd. pain. no n/v/d. no dark stools. no blood per rectum. . No other complaints. Pt was recently admitted at [facility] in 9/18 for SOB. pt. is not on long term AC for PAF for h/o gi bleed. (16 Oct 2018 21:07)  Above HPI noted patient reports 4 days of productive cough A/W chest pain while coughing but not at rest. Denies sick contact, chills, sweats, weakness, fatigue, weight gain, increased PO fluid intake or missing meds.  Admits to increased SOB. During recent admissions Ef noted to be 20%, home health nurse called patient PCP secondary to breath sounds and patient was advised to come to the ED.      REVIEW OF SYSTEMS:  CONSTITUTIONAL: No fever, weight loss, or fatigue  EYES: No eye pain, visual disturbances, or discharge  ENMT:  No difficulty hearing, tinnitus, vertigo; No sinus or throat pain  NECK: No pain or stiffness  RESPIRATORY: Productive cough, wheezing, No chills or hemoptysis; increased shortness of breath  CARDIOVASCULAR: + chest pain, no palpitations, dizziness, or leg swelling  GASTROINTESTINAL: No abdominal or epigastric pain. No nausea, vomiting, or hematemesis; No diarrhea or constipation. No melena or hematochezia.  GENITOURINARY: No dysuria, frequency, hematuria, or incontinence  NEUROLOGICAL: No headaches, memory loss, loss of strength, numbness, or tremors  SKIN: No itching, burning, rashes, or lesions   LYMPH NODES: No enlarged glands  ENDOCRINE: No heat or cold intolerance; No hair loss  MUSCULOSKELETAL: No joint pain or swelling; No muscle, back, or extremity pain  PSYCHIATRIC: No depression, anxiety, mood swings, or difficulty sleeping  HEME/LYMPH: No easy bruising, or bleeding gums  ALLERY AND IMMUNOLOGIC: No hives or eczema    MEDICATIONS  (STANDING):  aspirin enteric coated 81 milliGRAM(s) Oral daily  calcitriol   Capsule 0.25 MICROGram(s) Oral daily  calcium carbonate   1250 mG (OsCal) 1 Tablet(s) Oral three times a day  carvedilol 12.5 milliGRAM(s) Oral every 12 hours  dextrose 5%. 1000 milliLiter(s) (50 mL/Hr) IV Continuous <Continuous>  dextrose 50% Injectable 12.5 Gram(s) IV Push once  dextrose 50% Injectable 25 Gram(s) IV Push once  dextrose 50% Injectable 25 Gram(s) IV Push once  ferrous sulfate Oral Tab/Cap - Peds 325 milliGRAM(s) Oral daily  heparin  Injectable 5000 Unit(s) SubCutaneous every 12 hours  insulin glargine Injectable (LANTUS) 10 Unit(s) SubCutaneous at bedtime  insulin lispro (HumaLOG) corrective regimen sliding scale   SubCutaneous three times a day before meals  insulin lispro (HumaLOG) corrective regimen sliding scale   SubCutaneous at bedtime  pantoprazole    Tablet 40 milliGRAM(s) Oral before breakfast  sacubitril 97 mG/valsartan 103 mG 1 Tablet(s) Oral two times a day    MEDICATIONS  (PRN):  dextrose 40% Gel 15 Gram(s) Oral once PRN Blood Glucose LESS THAN 70 milliGRAM(s)/deciliter  docusate sodium 100 milliGRAM(s) Oral two times a day PRN Constipation  glucagon  Injectable 1 milliGRAM(s) IntraMuscular once PRN Glucose LESS THAN 70 milligrams/deciliter  senna 2 Tablet(s) Oral at bedtime PRN Constipation        PAST MEDICAL & SURGICAL HISTORY:  Coronary artery disease, angina presence unspecified, unspecified vessel or lesion type, unspecified whether native or transplanted heart  Paroxysmal atrial fibrillation  Anemia due to chronic kidney disease, unspecified CKD stage  Chronic systolic congestive heart failure  Pleural effusion  Pericardial effusion  End stage renal disease on dialysis  HLD (hyperlipidemia)  HTN (hypertension)  DM (diabetes mellitus)  A-V fistula  Encounter for dialysis catheter care      FAMILY HISTORY:  Family history of kidney disease in brother (Sibling)      SOCIAL HISTORY:    CIGARETTES:  No    ALCOHOL: NO    DRUGS: NO    Vital Signs Last 24 Hrs  T(C): 36.8 (16 Oct 2018 21:13), Max: 36.8 (16 Oct 2018 13:46)  T(F): 98.3 (16 Oct 2018 21:13), Max: 98.3 (16 Oct 2018 13:46)  HR: 90 (16 Oct 2018 21:13) (90 - 93)  BP: 166/95 (16 Oct 2018 21:13) (144/78 - 167/92)  BP(mean): --  RR: 22 (16 Oct 2018 21:13) (18 - 22)  SpO2: 97% (16 Oct 2018 21:13) (90% - 97%)      PHYSICAL EXAM:  GENERAL: NAD, well-groomed, well-developed  HEAD:  Atraumatic, Normocephalic  EYES: EOMI, PERRLA, conjunctiva and sclera clear  ENMT: No tonsillar erythema, exudates, or enlargement; Moist mucous membranes, Good dentition, No lesions  NECK: Supple, No JVD, Normal thyroid  NERVOUS SYSTEM:  Alert & Oriented X3, Good concentration; Motor Strength 5/5 B/L upper and lower extremities; DTRs 2+ intact and symmetric  CHEST/LUNG: Clear to percussion bilaterally; No rales, rhonchi, wheezing, or rubs  HEART: Regular rate and rhythm; No murmurs, rubs, or gallops  ABDOMEN: Soft, Nontender, Nondistended; Bowel sounds present  EXTREMITIES:  2+ Peripheral Pulses, No clubbing, cyanosis, or edema  LYMPH: No lymphadenopathy noted  SKIN: No rashes or lesions      INTERPRETATION OF TELEMETRY: no arrhythmia     ECG: Sinus 89 incomplete RBBB, nonspecific T wave changes     I&O's Detail      LABS:                        7.7    7.1   )-----------( 502      ( 16 Oct 2018 15:40 )             27.3     10-16    135  |  91<L>  |  34.0<H>  ----------------------------<  396<H>  4.2   |  28.0  |  3.90<H>    Ca    8.5<L>      16 Oct 2018 15:40    TPro  7.9  /  Alb  3.3  /  TBili  1.0  /  DBili  x   /  AST  17  /  ALT  11  /  AlkPhos  135<H>  10-16    Pro BNP:     I&O's Summary      RADIOLOGY & ADDITIONAL STUDIES:  X-ray:    IMPRESSION:  Moderate right-sided pleural effusion, unchanged. Improved bilateral   lower lobe infiltrates, right worse then left.  JENNIFER BECKER M.D., ATTENDING RADIOLOGIST  This document has been electronically signed. Oct 16 2018  3:43PM    Chest CT Scan:  IMPRESSION:      Moderate-severe CHF/fluid overload with marked cardiomegaly,   interstitial/pulmonary alveolar edema, moderate right and small left   pleural effusions, and anasarca.    Complete passive atelectasis of the right lower lobe. Minimal left   basilar passive atelectasis.    Interval development of chronic scarring of the right middle lobe and   left lingula with associated traction bronchiectasis suggesting sequela   of mycobacterium avium complex infection.    Small amount of of upper abdominal ascites.  JOAQUIN MARTINEZ M.D., ATTENDING RADIOLOGIST  This document has been electronically signed. Oct 16 2018  4:56PM          PREVIOUS DIAGNOSTIC TESTING:    ECHO:  Summary:   1. Severely decreased global left ventricular systolic function. Left   ventricular ejection fraction, by visual estimation, is 20 to 25%.   2. Mildly reduced RV systolic function.   3. Mild to moderate functional mitral regurgitation.   4. Moderate tricuspid regurgitation.   5. Trace pericardial effusion.   6. Large pleural effusion in both left and right lateral regions.   7. ** Compared to TTE dated 8/31/18, biventricular systolic function   unchanged.    [id number] Gissel Salazar DO, Electronically signed on 9/21/2018 at   10:49:06 AM     STRESS: None    CATHETERIZATION None Consult requested by:  Dr Lloyd    Reason for Consultation: CHF    History obtained by: Patient and medical record     obtained: Yes    Chief complaint:  SOB    HPI:  Pt is a 59 Y/O F w/ PMHx of ESRD (on HD MWF), CAD, CHF-systolic, IDDM2, HTN, HLD, paroxysmal Afib, h/o GI bleed and PNA w/ parapneumonic effusion s/p chest tube placement who presents to the ED w/ SOB and productive cough (yellow phlegm w/ streak of blood) X 3 days. no fever , no chills.  SOB is worse on exertion. Pt was seen by nurse at home today, on examination pt was noted to reduced breath sounds on right and was advised to come in for PNA r/o. Pt reports mild headache and denies any abd. pain. no n/v/d. no dark stools. no blood per rectum. . No other complaints. Pt was recently admitted at [facility] in 9/18 for SOB. pt. is not on long term AC for PAF for h/o gi bleed. (16 Oct 2018 21:07)  Above HPI noted patient reports 4 days of productive cough A/W chest pain while coughing but not at rest. Denies sick contact, chills, sweats, weakness, fatigue, weight gain, increased PO fluid intake or missing meds.  Admits to increased SOB. During recent admissions Ef noted to be 20%, home health nurse called patient PCP secondary to breath sounds and patient was advised to come to the ED.      REVIEW OF SYSTEMS:  CONSTITUTIONAL: No fever, weight loss, or fatigue  EYES: No eye pain, visual disturbances, or discharge  ENMT:  No difficulty hearing, tinnitus, vertigo; No sinus or throat pain  NECK: No pain or stiffness  RESPIRATORY: Productive cough, wheezing, No chills or hemoptysis; increased shortness of breath  CARDIOVASCULAR: + chest pain, no palpitations, dizziness, or leg swelling  GASTROINTESTINAL: No abdominal or epigastric pain. No nausea, vomiting, or hematemesis; No diarrhea or constipation. No melena or hematochezia.  GENITOURINARY: No dysuria, frequency, hematuria, or incontinence  NEUROLOGICAL: No headaches, memory loss, loss of strength, numbness, or tremors  SKIN: No itching, burning, rashes, or lesions   LYMPH NODES: No enlarged glands  ENDOCRINE: No heat or cold intolerance; No hair loss  MUSCULOSKELETAL: No joint pain or swelling; No muscle, back, or extremity pain  PSYCHIATRIC: No depression, anxiety, mood swings, or difficulty sleeping  HEME/LYMPH: No easy bruising, or bleeding gums  ALLERY AND IMMUNOLOGIC: No hives or eczema    MEDICATIONS  (STANDING):  aspirin enteric coated 81 milliGRAM(s) Oral daily  calcitriol   Capsule 0.25 MICROGram(s) Oral daily  calcium carbonate   1250 mG (OsCal) 1 Tablet(s) Oral three times a day  carvedilol 12.5 milliGRAM(s) Oral every 12 hours  dextrose 5%. 1000 milliLiter(s) (50 mL/Hr) IV Continuous <Continuous>  dextrose 50% Injectable 12.5 Gram(s) IV Push once  dextrose 50% Injectable 25 Gram(s) IV Push once  dextrose 50% Injectable 25 Gram(s) IV Push once  ferrous sulfate Oral Tab/Cap - Peds 325 milliGRAM(s) Oral daily  heparin  Injectable 5000 Unit(s) SubCutaneous every 12 hours  insulin glargine Injectable (LANTUS) 10 Unit(s) SubCutaneous at bedtime  insulin lispro (HumaLOG) corrective regimen sliding scale   SubCutaneous three times a day before meals  insulin lispro (HumaLOG) corrective regimen sliding scale   SubCutaneous at bedtime  pantoprazole    Tablet 40 milliGRAM(s) Oral before breakfast  sacubitril 97 mG/valsartan 103 mG 1 Tablet(s) Oral two times a day    MEDICATIONS  (PRN):  dextrose 40% Gel 15 Gram(s) Oral once PRN Blood Glucose LESS THAN 70 milliGRAM(s)/deciliter  docusate sodium 100 milliGRAM(s) Oral two times a day PRN Constipation  glucagon  Injectable 1 milliGRAM(s) IntraMuscular once PRN Glucose LESS THAN 70 milligrams/deciliter  senna 2 Tablet(s) Oral at bedtime PRN Constipation        PAST MEDICAL & SURGICAL HISTORY:  Coronary artery disease, angina presence unspecified, unspecified vessel or lesion type, unspecified whether native or transplanted heart  Paroxysmal atrial fibrillation  Anemia due to chronic kidney disease, unspecified CKD stage  Chronic systolic congestive heart failure  Pleural effusion  Pericardial effusion  End stage renal disease on dialysis  HLD (hyperlipidemia)  HTN (hypertension)  DM (diabetes mellitus)  A-V fistula  Encounter for dialysis catheter care      FAMILY HISTORY:  Family history of kidney disease in brother (Sibling)      SOCIAL HISTORY:    CIGARETTES:  No    ALCOHOL: NO    DRUGS: NO    Vital Signs Last 24 Hrs  T(C): 36.8 (16 Oct 2018 21:13), Max: 36.8 (16 Oct 2018 13:46)  T(F): 98.3 (16 Oct 2018 21:13), Max: 98.3 (16 Oct 2018 13:46)  HR: 90 (16 Oct 2018 21:13) (90 - 93)  BP: 166/95 (16 Oct 2018 21:13) (144/78 - 167/92)  BP(mean): --  RR: 22 (16 Oct 2018 21:13) (18 - 22)  SpO2: 97% (16 Oct 2018 21:13) (90% - 97%)      PHYSICAL EXAM:  GENERAL: NAD, well-groomed, well-developed  HEAD:  Atraumatic, Normocephalic  EYES: EOMI, PERRLA, conjunctiva and sclera clear  ENMT: No tonsillar erythema, exudates, or enlargement; Moist mucous membranes, Good dentition, No lesions  NECK: Supple, No JVD, Normal thyroid  NERVOUS SYSTEM:  Alert & Oriented X3, Good concentration; Motor Strength 5/5 B/L upper and lower extremities; DTRs 2+ intact and symmetric  CHEST/LUNG: Clear to percussion bilaterally; No rales, rhonchi, wheezing, or rubs  HEART: Regular rate and rhythm; No murmurs, rubs, or gallops  ABDOMEN: Soft, Nontender, Nondistended; Bowel sounds present  EXTREMITIES:  2+ Peripheral Pulses, No clubbing, cyanosis, or edema  LYMPH: No lymphadenopathy noted  SKIN: No rashes or lesions      INTERPRETATION OF TELEMETRY: no arrhythmia     ECG: Sinus 89 incomplete RBBB, nonspecific T wave changes     I&O's Detail      LABS:                        7.7    7.1   )-----------( 502      ( 16 Oct 2018 15:40 )             27.3     10-16    135  |  91<L>  |  34.0<H>  ----------------------------<  396<H>  4.2   |  28.0  |  3.90<H>    Ca    8.5<L>      16 Oct 2018 15:40    TPro  7.9  /  Alb  3.3  /  TBili  1.0  /  DBili  x   /  AST  17  /  ALT  11  /  AlkPhos  135<H>  10-16    Pro BNP:     I&O's Summary      RADIOLOGY & ADDITIONAL STUDIES:  X-ray:    IMPRESSION:  Moderate right-sided pleural effusion, unchanged. Improved bilateral   lower lobe infiltrates, right worse then left.  JENNIFER BECKER M.D., ATTENDING RADIOLOGIST  This document has been electronically signed. Oct 16 2018  3:43PM    Chest CT Scan:  IMPRESSION:      Moderate-severe CHF/fluid overload with marked cardiomegaly,   interstitial/pulmonary alveolar edema, moderate right and small left   pleural effusions, and anasarca.    Complete passive atelectasis of the right lower lobe. Minimal left   basilar passive atelectasis.    Interval development of chronic scarring of the right middle lobe and   left lingula with associated traction bronchiectasis suggesting sequela   of mycobacterium avium complex infection.    Small amount of of upper abdominal ascites.  JOAQUIN MARTINEZ M.D., ATTENDING RADIOLOGIST  This document has been electronically signed. Oct 16 2018  4:56PM          PREVIOUS DIAGNOSTIC TESTING:    ECHO:  Summary:   1. Severely decreased global left ventricular systolic function. Left   ventricular ejection fraction, by visual estimation, is 20 to 25%.   2. Mildly reduced RV systolic function.   3. Mild to moderate functional mitral regurgitation.   4. Moderate tricuspid regurgitation.   5. Trace pericardial effusion.   6. Large pleural effusion in both left and right lateral regions.   7. ** Compared to TTE dated 8/31/18, biventricular systolic function   unchanged.    [id number] Gissel Salazar , Electronically signed on 9/21/2018 at   10:49:06 AM     STRESS: None    CATHETERIZATION:  CORONARY VESSELS: The coronary circulation is right dominant.  LM:   --  LM: Normal. The vessel was normal sized, not calcified, and not  tortuous. Angiography showed no evidence of disease.  LAD:   --  LAD: Normal. The vessel was normal sized, not calcified, and not  tortuous. Angiography showed minor luminal irregularities with no flow  limiting lesions.  CX:   --  Circumflex: Normal. The vessel was normal sized, not calcified,  and excessively tortuous. Angiography showed no evidence of disease.  RCA:   --  RCA: Normal. The vessel was normal sized, not calcified, and  moderately tortuous. Angiography showed no evidence of disease.  COMPLICATIONS: There were no complications. No complications occurred  during the cath lab visit.  DIAGNOSTIC IMPRESSIONS: There is mild irregularity of the coronary anatomy.  Left ventricular function is normal (LVEF=55%).  DIAGNOSTIC RECOMMENDATIONS: The patient should continue with the present  medications. Patient management should include aggressive medical therapy  and resumption of all previous activities in 2 days.  Prepared and signed by  Jesús Godinez MD  Signed 03/23/2018 14:30:17 Consult requested by:  Dr Lloyd    Reason for Consultation: CHF    History obtained by: Patient and medical record     obtained: Yes    Chief complaint:  SOB    HPI:  Pt is a 57 Y/O F w/ PMHx of ESRD (on HD MWF), CAD, CHF-systolic, IDDM2, HTN, HLD, paroxysmal Afib, h/o GI bleed and PNA w/ parapneumonic effusion s/p chest tube placement who presents to the ED w/ SOB and productive cough (yellow phlegm w/ streak of blood) X 3 days. no fever , no chills.  SOB is worse on exertion. Pt was seen by nurse at home today, on examination pt was noted to reduced breath sounds on right and was advised to come in for PNA r/o. Pt reports mild headache and denies any abd. pain. no n/v/d. no dark stools. no blood per rectum. . No other complaints. Pt was recently admitted at [facility] in 9/18 for SOB. pt. is not on long term AC for PAF for h/o gi bleed. (16 Oct 2018 21:07)  Above HPI noted patient reports 4 days of productive cough A/W chest pain while coughing but not at rest. Denies sick contact, chills, sweats, weakness, fatigue, weight gain, increased PO fluid intake or missing meds.  Admits to increased SOB. During recent admissions Ef noted to be 20%, home health nurse called patient PCP secondary to breath sounds and patient was advised to come to the ED.      REVIEW OF SYSTEMS:  CONSTITUTIONAL: No fever, weight loss, or fatigue  EYES: No eye pain, visual disturbances, or discharge  ENMT:  No difficulty hearing, tinnitus, vertigo; No sinus or throat pain  NECK: No pain or stiffness  RESPIRATORY: Productive cough, wheezing, No chills or hemoptysis; increased shortness of breath  CARDIOVASCULAR: + chest pain, no palpitations, dizziness, or leg swelling  GASTROINTESTINAL: No abdominal or epigastric pain. No nausea, vomiting, or hematemesis; No diarrhea or constipation. No melena or hematochezia.  GENITOURINARY: No dysuria, frequency, hematuria, or incontinence  NEUROLOGICAL: No headaches, memory loss, loss of strength, numbness, or tremors  SKIN: No itching, burning, rashes, or lesions   LYMPH NODES: No enlarged glands  ENDOCRINE: No heat or cold intolerance; No hair loss  MUSCULOSKELETAL: No joint pain or swelling; No muscle, back, or extremity pain  PSYCHIATRIC: No depression, anxiety, mood swings, or difficulty sleeping  HEME/LYMPH: No easy bruising, or bleeding gums  ALLERY AND IMMUNOLOGIC: No hives or eczema    MEDICATIONS  (STANDING):  aspirin enteric coated 81 milliGRAM(s) Oral daily  calcitriol   Capsule 0.25 MICROGram(s) Oral daily  calcium carbonate   1250 mG (OsCal) 1 Tablet(s) Oral three times a day  carvedilol 12.5 milliGRAM(s) Oral every 12 hours  dextrose 5%. 1000 milliLiter(s) (50 mL/Hr) IV Continuous <Continuous>  dextrose 50% Injectable 12.5 Gram(s) IV Push once  dextrose 50% Injectable 25 Gram(s) IV Push once  dextrose 50% Injectable 25 Gram(s) IV Push once  ferrous sulfate Oral Tab/Cap - Peds 325 milliGRAM(s) Oral daily  heparin  Injectable 5000 Unit(s) SubCutaneous every 12 hours  insulin glargine Injectable (LANTUS) 10 Unit(s) SubCutaneous at bedtime  insulin lispro (HumaLOG) corrective regimen sliding scale   SubCutaneous three times a day before meals  insulin lispro (HumaLOG) corrective regimen sliding scale   SubCutaneous at bedtime  pantoprazole    Tablet 40 milliGRAM(s) Oral before breakfast  sacubitril 97 mG/valsartan 103 mG 1 Tablet(s) Oral two times a day    MEDICATIONS  (PRN):  dextrose 40% Gel 15 Gram(s) Oral once PRN Blood Glucose LESS THAN 70 milliGRAM(s)/deciliter  docusate sodium 100 milliGRAM(s) Oral two times a day PRN Constipation  glucagon  Injectable 1 milliGRAM(s) IntraMuscular once PRN Glucose LESS THAN 70 milligrams/deciliter  senna 2 Tablet(s) Oral at bedtime PRN Constipation        PAST MEDICAL & SURGICAL HISTORY:  Coronary artery disease, angina presence unspecified, unspecified vessel or lesion type, unspecified whether native or transplanted heart  Paroxysmal atrial fibrillation  Anemia due to chronic kidney disease, unspecified CKD stage  Chronic systolic congestive heart failure  Pleural effusion  Pericardial effusion  End stage renal disease on dialysis  HLD (hyperlipidemia)  HTN (hypertension)  DM (diabetes mellitus)  A-V fistula  Encounter for dialysis catheter care      FAMILY HISTORY:  Family history of kidney disease in brother (Sibling)      SOCIAL HISTORY:    CIGARETTES:  No    ALCOHOL: NO    DRUGS: NO    Vital Signs Last 24 Hrs  T(C): 36.8 (16 Oct 2018 21:13), Max: 36.8 (16 Oct 2018 13:46)  T(F): 98.3 (16 Oct 2018 21:13), Max: 98.3 (16 Oct 2018 13:46)  HR: 90 (16 Oct 2018 21:13) (90 - 93)  BP: 166/95 (16 Oct 2018 21:13) (144/78 - 167/92)  BP(mean): --  RR: 22 (16 Oct 2018 21:13) (18 - 22)  SpO2: 97% (16 Oct 2018 21:13) (90% - 97%)      PHYSICAL EXAM:  GENERAL: NAD, well-groomed, well-developed  HEAD:  Atraumatic, Normocephalic  EYES: EOMI, PERRLA, conjunctiva and sclera clear  ENMT: No tonsillar erythema, exudates, or enlargement; Moist mucous membranes, Good dentition, No lesions  NECK: Supple, No JVD, Normal thyroid  NERVOUS SYSTEM:  Alert & Oriented X3, Good concentration; Motor Strength 5/5 B/L upper and lower extremities; DTRs 2+ intact and symmetric  CHEST/LUNG: Bilateral wheezing   HEART: Regular rate and rhythm; No murmurs, rubs, or gallops  ABDOMEN: Soft, Nontender, Nondistended; Bowel sounds present  EXTREMITIES:  2+ Peripheral Pulses, No clubbing, cyanosis, or edema  LYMPH: No lymphadenopathy noted  SKIN: No rashes or lesions      INTERPRETATION OF TELEMETRY: no arrhythmia     ECG: Sinus 89 incomplete RBBB, nonspecific T wave changes     I&O's Detail      LABS:                        7.7    7.1   )-----------( 502      ( 16 Oct 2018 15:40 )             27.3     10-16    135  |  91<L>  |  34.0<H>  ----------------------------<  396<H>  4.2   |  28.0  |  3.90<H>    Ca    8.5<L>      16 Oct 2018 15:40    TPro  7.9  /  Alb  3.3  /  TBili  1.0  /  DBili  x   /  AST  17  /  ALT  11  /  AlkPhos  135<H>  10-16    Pro BNP:     I&O's Summary      RADIOLOGY & ADDITIONAL STUDIES:  X-ray:    IMPRESSION:  Moderate right-sided pleural effusion, unchanged. Improved bilateral   lower lobe infiltrates, right worse then left.  JENNIFER BECKER M.D., ATTENDING RADIOLOGIST  This document has been electronically signed. Oct 16 2018  3:43PM    Chest CT Scan:  IMPRESSION:      Moderate-severe CHF/fluid overload with marked cardiomegaly,   interstitial/pulmonary alveolar edema, moderate right and small left   pleural effusions, and anasarca.    Complete passive atelectasis of the right lower lobe. Minimal left   basilar passive atelectasis.    Interval development of chronic scarring of the right middle lobe and   left lingula with associated traction bronchiectasis suggesting sequela   of mycobacterium avium complex infection.    Small amount of of upper abdominal ascites.  JOAQUIN MARTINEZ M.D., ATTENDING RADIOLOGIST  This document has been electronically signed. Oct 16 2018  4:56PM          PREVIOUS DIAGNOSTIC TESTING:    ECHO:  Summary:   1. Severely decreased global left ventricular systolic function. Left   ventricular ejection fraction, by visual estimation, is 20 to 25%.   2. Mildly reduced RV systolic function.   3. Mild to moderate functional mitral regurgitation.   4. Moderate tricuspid regurgitation.   5. Trace pericardial effusion.   6. Large pleural effusion in both left and right lateral regions.   7. ** Compared to TTE dated 8/31/18, biventricular systolic function   unchanged.    [id number] Gissel Urbina Salazar , Electronically signed on 9/21/2018 at   10:49:06 AM     STRESS: None    CATHETERIZATION:  CORONARY VESSELS: The coronary circulation is right dominant.  LM:   --  LM: Normal. The vessel was normal sized, not calcified, and not  tortuous. Angiography showed no evidence of disease.  LAD:   --  LAD: Normal. The vessel was normal sized, not calcified, and not  tortuous. Angiography showed minor luminal irregularities with no flow  limiting lesions.  CX:   --  Circumflex: Normal. The vessel was normal sized, not calcified,  and excessively tortuous. Angiography showed no evidence of disease.  RCA:   --  RCA: Normal. The vessel was normal sized, not calcified, and  moderately tortuous. Angiography showed no evidence of disease.  COMPLICATIONS: There were no complications. No complications occurred  during the cath lab visit.  DIAGNOSTIC IMPRESSIONS: There is mild irregularity of the coronary anatomy.  Left ventricular function is normal (LVEF=55%).  DIAGNOSTIC RECOMMENDATIONS: The patient should continue with the present  medications. Patient management should include aggressive medical therapy  and resumption of all previous activities in 2 days.  Prepared and signed by  Jesús Godinez MD  Signed 03/23/2018 14:30:17

## 2018-10-17 NOTE — CONSULT NOTE ADULT - SUBJECTIVE AND OBJECTIVE BOX
JAROCHO MORALES  58y  Female  [id number]      Patient is a 58y old  Female who presents with a chief complaint of SOB (17 Oct 2018 01:12)    HPI:  Pt is a 59 Y/O F w/ PMHx of ESRD (on HD MWF), CAD, CHF-systolic, IDDM2, HTN, HLD, paroxysmal Afib, h/o GI bleed and PNA w/ parapneumonic effusion s/p chest tube placement who presents to the ED w/ SOB and productive cough some yellow phlegm, streak of blood ?  no fever , no chills.  SOB is worse on exertion. Pt was seen by nurse at home today, on examination pt was noted to reduced breath sounds on right and was advised to come in for PNA r/o. Pt reports mild headache and denies any abd. pain. no n/v/d. no dark stools. no blood per rectum. . No other complaints. Pt was recently admitted at [facility] in 9/18 for SOB. pt. is not on long term AC for PAF for h/o gi bleed. (16 Oct 2018 21:07)  Asked to see for severe anemia.  Denies bleeding  Feels weak and tired.    PAST MEDICAL & SURGICAL HISTORY:  Coronary artery disease, angina presence unspecified, unspecified vessel or lesion type, unspecified whether native or transplanted heart  Paroxysmal atrial fibrillation  Anemia due to chronic kidney disease, unspecified CKD stage  Chronic systolic congestive heart failure  Pleural effusion  Pericardial effusion  End stage renal disease on dialysis  HLD (hyperlipidemia)  HTN (hypertension)  DM (diabetes mellitus)  A-V fistula  Encounter for dialysis catheter care    FAMILY HISTORY:  Family history of kidney disease in brother (Sibling)    REVIEW OF SYSTEMS  Generalized   weakness  Nausea  No overt bleeding  No pain  Cardiac. Neg  Respiratory.  Neg.  GI Neg.	  All other ROS is neg.      PHYSICAL EXAM:  Aler and oriented  Positive pallor  No jaundice  NO LNs  Lungs: Clear  Heart: RR  Abd: Question of splenomegaly or a left flank mass.  LEs: No CCE or DVT clinically.    CBC Full  -  ( 17 Oct 2018 06:10 )  WBC Count : 9.4 K/uL  Hemoglobin : 6.7 g/dL  Hematocrit : 23.5 %  Platelet Count - Automated : 426 K/uL  Mean Cell Volume : 87.0 fl  Mean Cell Hemoglobin : 24.8 pg  Mean Cell Hemoglobin Concentration : 28.5 g/dL  Auto Neutrophil # : 7.5 K/uL  Auto Lymphocyte # : 1.2 K/uL  Auto Monocyte # : 0.4 K/uL  Auto Eosinophil # : 0.2 K/uL  Auto Basophil # : 0.0 K/uL  Auto Neutrophil % : 79.4 %  Auto Lymphocyte % : 12.4 %  Auto Monocyte % : 4.8 %  Auto Eosinophil % : 1.8 %  Auto Basophil % : 0.3 %    PT/INR - ( 17 Oct 2018 06:10 )   PT: 12.8 sec;   INR: 1.16 ratio         PTT - ( 17 Oct 2018 06:10 )  PTT:26.6 sec  17 Oct 2018 06:10    138    |  96     |  40.0   ----------------------------<  178    3.8     |  27.0   |  4.36     Ca    8.8        17 Oct 2018 06:10  Phos  2.2       17 Oct 2018 06:10  Mg     2.0       17 Oct 2018 06:10    TPro  7.3    /  Alb  3.1    /  TBili  0.8    /  DBili  x      /  AST  12     /  ALT  10     /  AlkPhos  114    17 Oct 2018 06:10        A/P: Anemia of CRF  Also R/O Fe def.    Will check Fe studies  Transfuse 2 units of PRBCs  MARGI as deemed appropriate by renal.    Thank you.

## 2018-10-17 NOTE — CONSULT NOTE ADULT - ASSESSMENT
Assessment and Plan:   · Assessment		  Pt is a 57 Y/O F w/ PMHx of ESRD (on HD MWF), CAD, CHF-systolic, IDDM2, HTN, HLD, paroxysmal Afib, h/o GI bleed and PNA w/ parapneumonic effusion s/p chest tube placement in august 2018,  w/ c/o SOB on exertion and productive cough, admitted for dyspnea 2/2 fluid overload 2/2 ESRD. Pt is scheduled for HD tomorrow. CT chest on admission shows moderate-severe chf/ fluid overload, moderate R side and small L side pleural effusion. CXR shows moderate RS pleural effusion, improved b/l left lower lobe infiltrate R>L. Nephrology consulted.        Problem/Plan - 1:  ·  Problem: Dyspnea on exertion.  Plan: Dyspnea is likely from fluid overload from ESRd, Dialysis in am      Oxygen: O2 via NC; keep O2 saturation > 92%     Problem/Plan - 2:  ·  Problem: End stage renal disease on dialysis.  Plan: HD schedule MWF, next dialysis tomorrow      Per Nephrology   Problem/Plan - 3:  ·  Problem: R/O Anemia, unspecified type.  Plan: Likely Anemia of chronic disease Per PCP team     Problem/Plan - 4:  ·  Problem: Type 2 diabetes mellitus with hyperglycemia, with long-term current use of insulin.  Plan:     Continue with lantus    HISS     Problem/Plan - 5:  ·  Problem: Essential hypertension.  Plan:     Above goal 140/90    Continue with home medication    Monitor and evaluate after HD.      Problem/Plan - 6:  Problem: Chronic systolic congestive heart failure. Plan:    TTE done on 9/21/18: LVEF 20-25%    Strict I&O's   Continue with Entresto     Problem/Plan - 7:  ·  Problem: Coronary artery disease, Cardiac cath 3/28/2018 negative Plan: Stable     Continue with ASA, Coreg, Entresto.      Problem/Plan - 8:  ·  Problem: Paroxysmal atrial fibrillation.  Plan: rate controlled      Continue with coreg      On aspirin.      Problem/Plan - 9:  ·  Problem: Prophylactic measure.  Plan: -Heparin 5000U SC Q12H. Assessment and Plan:   · Assessment		  Pt is a 57 Y/O F w/ PMHx of ESRD (on HD MWF),  CHF-systolic, IDDM2, HTN, HLD, paroxysmal Afib, h/o GI bleed and PNA w/ parapneumonic effusion s/p chest tube placement in august 2018,  w/ c/o SOB on exertion and productive cough, admitted for dyspnea 2/2 fluid overload 2/2 ESRD. Pt is scheduled for HD tomorrow. CT chest on admission shows moderate-severe chf/ fluid overload, moderate R side and small L side pleural effusion. CXR shows moderate RS pleural effusion, improved b/l left lower lobe infiltrate R>L. Nephrology consulted.        Problem/Plan - 1:  ·  Problem: Dyspnea on exertion.  Plan: Dyspnea is likely from fluid overload from ESRd, Dialysis in am      Oxygen: O2 via NC; keep O2 saturation > 92%     Problem/Plan - 2:  ·  Problem: End stage renal disease on dialysis.  Plan: HD schedule MWF, next dialysis tomorrow      Per Nephrology   Problem/Plan - 3:  ·  Problem: R/O Anemia, unspecified type.  Plan: Likely Anemia of chronic disease Per PCP team     Problem/Plan - 4:  ·  Problem: Type 2 diabetes mellitus with hyperglycemia, with long-term current use of insulin.  Plan:     Continue with lantus    HISS     Problem/Plan - 5:  ·  Problem: Essential hypertension.  Plan:     Above goal 140/90    Continue with home medication    Monitor and evaluate after HD.      Problem/Plan - 6:  Problem: Chronic systolic congestive heart failure. Plan:    TTE done on 9/21/18: LVEF 20-25%    Strict I&O's   Continue with Entresto     Problem/Plan - 7:  ·  Problem: Coronary artery disease, Cardiac cath 3/28/2018 negative Plan: Stable     Continue with ASA, Coreg, Entresto.      Problem/Plan - 8:  ·  Problem: Paroxysmal atrial fibrillation.  Plan: rate controlled      Continue with coreg      On aspirin.      Problem/Plan - 9:  ·  Problem: Prophylactic measure.  Plan: -Heparin 5000U SC Q12H.

## 2018-10-17 NOTE — CONSULT NOTE ADULT - SUBJECTIVE AND OBJECTIVE BOX
Patient is a 58y old  Female who presents with a chief complaint of SOB (17 Oct 2018 11:26)      HPI:  Pt is a 57 Y/O F w/ PMHx of ESRD (on HD MWF), CAD, CHF-systolic, IDDM2, HTN, HLD, paroxysmal Afib, h/o GI bleed and PNA w/ parapneumonic effusion s/p chest tube placement who presents to the ED w/ SOB and productive cough some yellow phlegm, streak of blood ?  no fever , no chills.  SOB is worse on exertion. Pt was seen by nurse at home today, on examination pt was noted to reduced breath sounds on right and was advised to come in for PNA r/o. Pt reports mild headache and denies any abd. pain. no n/v/d. no dark stools. no blood per rectum. . No other complaints. Pt was recently admitted at [facility] in 9/18 for SOB. pt. is not on long term AC for PAF for h/o gi bleed. (16 Oct 2018 21:07)  Still has mild SOB OE, no CP COugh      PAST MEDICAL & SURGICAL HISTORY:  Coronary artery disease, angina presence unspecified, unspecified vessel or lesion type, unspecified whether native or transplanted heart  Paroxysmal atrial fibrillation  Anemia due to chronic kidney disease, unspecified CKD stage  Chronic systolic congestive heart failure  Pleural effusion  Pericardial effusion  End stage renal disease on dialysis  HLD (hyperlipidemia)  HTN (hypertension)  DM (diabetes mellitus)  A-V fistula  Encounter for dialysis catheter care      FAMILY HISTORY:  Family history of kidney disease in brother (Sibling)      Social History:   -smoke   - Alcohol   -    Drugs      REVIEW OF SYSTEMS: overall comfortable  CONSTITUTIONAL: No fever, weight loss, or fatigue  EYES: No eye pain, No visual disturbances,   RESPIRATORY: No cough, hemoptysis; mild SOB  CARDIOVASCULAR: No chest pain, No palpitations,   -leg swelling  GASTROINTESTINAL: No abdominal , NVD or GIB  GENITOURINARY: No UTI sx/hematuria  NEUROLOGICAL: No HA/wk/numbness  MUSCULOSKELETAL: No joint pain, No swelling      Vital Signs Last 24 Hrs  T(C): 36.8 (17 Oct 2018 07:34), Max: 36.9 (17 Oct 2018 04:36)  T(F): 98.2 (17 Oct 2018 07:34), Max: 98.4 (17 Oct 2018 04:36)  HR: 78 (17 Oct 2018 09:31) (78 - 96)  BP: 152/85 (17 Oct 2018 07:34) (152/85 - 167/92)  BP(mean): --  RR: 20 (17 Oct 2018 07:34) (20 - 22)  SpO2: 95% (17 Oct 2018 09:31) (90% - 97%)    PHYSICAL EXAM:    GENERAL: NAD,   EYES:  conjunctiva and sclera clear  NECK: Supple, No JVD/Carotid Bruit -ve   NERVOUS SYSTEM:  A/O x3,   Lungs : few scatt post rales, few rhonchi,   HEART:  No murmur,  No rub, No gallops  ABDOMEN: Soft, NT/ND BS+  EXTREMITIES:  + Peripheral Pulses, mild edema  SKIN: No rashes or lesions      LABS:                        6.7    9.4   )-----------( 426      ( 17 Oct 2018 06:10 )             23.5     10-17    138  |  96<L>  |  40.0<H>  ----------------------------<  178<H>  3.8   |  27.0  |  4.36<H>    Ca    8.8      17 Oct 2018 06:10  Phos  2.2     10-17  Mg     2.0     10-17    TPro  7.3  /  Alb  3.1<L>  /  TBili  0.8  /  DBili  x   /  AST  12  /  ALT  10  /  AlkPhos  114  10-17    PT/INR - ( 17 Oct 2018 06:10 )   PT: 12.8 sec;   INR: 1.16 ratio         PTT - ( 17 Oct 2018 06:10 )  PTT:26.6 sec    Magnesium, Serum: 2.0 mg/dL (10-17 @ 06:10)  Phosphorus Level, Serum: 2.2 mg/dL (10-17 @ 06:10)      RADIOLOGY & ADDITIONAL TESTS: Chest CT shows bilat effusions/CHF    MEDICATIONS  (STANDING):  ALBUTerol/ipratropium for Nebulization  aspirin enteric coated  calcitriol   Capsule  calcium carbonate   1250 mG (OsCal)  carvedilol  chlorhexidine 2% Cloths  dextrose 40% Gel PRN  dextrose 5%.  dextrose 50% Injectable  dextrose 50% Injectable  dextrose 50% Injectable  docusate sodium PRN  ferrous sulfate Oral Tab/Cap - Peds  glucagon  Injectable PRN  heparin  Injectable  influenza   Vaccine  insulin glargine Injectable (LANTUS)  insulin lispro (HumaLOG) corrective regimen sliding scale  insulin lispro (HumaLOG) corrective regimen sliding scale  pantoprazole    Tablet  sacubitril 97 mG/valsartan 103 mG  senna PRN

## 2018-10-18 ENCOUNTER — APPOINTMENT (OUTPATIENT)
Dept: VASCULAR SURGERY | Facility: CLINIC | Age: 58
End: 2018-10-18

## 2018-10-18 LAB
ANION GAP SERPL CALC-SCNC: 12 MMOL/L — SIGNIFICANT CHANGE UP (ref 5–17)
BUN SERPL-MCNC: 24 MG/DL — HIGH (ref 8–20)
CALCIUM SERPL-MCNC: 8.8 MG/DL — SIGNIFICANT CHANGE UP (ref 8.6–10.2)
CHLORIDE SERPL-SCNC: 97 MMOL/L — LOW (ref 98–107)
CO2 SERPL-SCNC: 28 MMOL/L — SIGNIFICANT CHANGE UP (ref 22–29)
CREAT SERPL-MCNC: 3.27 MG/DL — HIGH (ref 0.5–1.3)
GLUCOSE BLDC GLUCOMTR-MCNC: 157 MG/DL — HIGH (ref 70–99)
GLUCOSE BLDC GLUCOMTR-MCNC: 160 MG/DL — HIGH (ref 70–99)
GLUCOSE BLDC GLUCOMTR-MCNC: 168 MG/DL — HIGH (ref 70–99)
GLUCOSE BLDC GLUCOMTR-MCNC: 176 MG/DL — HIGH (ref 70–99)
GLUCOSE BLDC GLUCOMTR-MCNC: 226 MG/DL — HIGH (ref 70–99)
GLUCOSE SERPL-MCNC: 198 MG/DL — HIGH (ref 70–115)
HCT VFR BLD CALC: 27.9 % — LOW (ref 37–47)
HGB BLD-MCNC: 8.6 G/DL — LOW (ref 12–16)
IRON SATN MFR SERPL: 14 % — SIGNIFICANT CHANGE UP (ref 14–50)
IRON SATN MFR SERPL: 28 UG/DL — LOW (ref 37–145)
MCHC RBC-ENTMCNC: 26.9 PG — LOW (ref 27–31)
MCHC RBC-ENTMCNC: 30.8 G/DL — LOW (ref 32–36)
MCV RBC AUTO: 87.2 FL — SIGNIFICANT CHANGE UP (ref 81–99)
PHOSPHATE SERPL-MCNC: 2.3 MG/DL — LOW (ref 2.4–4.7)
PLATELET # BLD AUTO: 409 K/UL — HIGH (ref 150–400)
POTASSIUM SERPL-MCNC: 3.7 MMOL/L — SIGNIFICANT CHANGE UP (ref 3.5–5.3)
POTASSIUM SERPL-SCNC: 3.7 MMOL/L — SIGNIFICANT CHANGE UP (ref 3.5–5.3)
RBC # BLD: 3.2 M/UL — LOW (ref 4.4–5.2)
RBC # FLD: 18.4 % — HIGH (ref 11–15.6)
SODIUM SERPL-SCNC: 137 MMOL/L — SIGNIFICANT CHANGE UP (ref 135–145)
TIBC SERPL-MCNC: 200 UG/DL — LOW (ref 220–430)
TRANSFERRIN SERPL-MCNC: 140 MG/DL — LOW (ref 192–382)
WBC # BLD: 7.8 K/UL — SIGNIFICANT CHANGE UP (ref 4.8–10.8)
WBC # FLD AUTO: 7.8 K/UL — SIGNIFICANT CHANGE UP (ref 4.8–10.8)

## 2018-10-18 PROCEDURE — 99232 SBSQ HOSP IP/OBS MODERATE 35: CPT

## 2018-10-18 PROCEDURE — 93308 TTE F-UP OR LMTD: CPT | Mod: 26

## 2018-10-18 RX ORDER — IRON SUCROSE 20 MG/ML
100 INJECTION, SOLUTION INTRAVENOUS EVERY 24 HOURS
Qty: 0 | Refills: 0 | Status: COMPLETED | OUTPATIENT
Start: 2018-10-18 | End: 2018-10-22

## 2018-10-18 RX ADMIN — INSULIN GLARGINE 10 UNIT(S): 100 INJECTION, SOLUTION SUBCUTANEOUS at 21:20

## 2018-10-18 RX ADMIN — Medication 2: at 08:22

## 2018-10-18 RX ADMIN — Medication 100 MILLIGRAM(S): at 21:23

## 2018-10-18 RX ADMIN — Medication 1 TABLET(S): at 18:09

## 2018-10-18 RX ADMIN — PANTOPRAZOLE SODIUM 40 MILLIGRAM(S): 20 TABLET, DELAYED RELEASE ORAL at 05:05

## 2018-10-18 RX ADMIN — CALCITRIOL 0.25 MICROGRAM(S): 0.5 CAPSULE ORAL at 12:39

## 2018-10-18 RX ADMIN — Medication 3 MILLILITER(S): at 20:29

## 2018-10-18 RX ADMIN — CHLORHEXIDINE GLUCONATE 1 APPLICATION(S): 213 SOLUTION TOPICAL at 05:03

## 2018-10-18 RX ADMIN — CARVEDILOL PHOSPHATE 12.5 MILLIGRAM(S): 80 CAPSULE, EXTENDED RELEASE ORAL at 05:05

## 2018-10-18 RX ADMIN — Medication 3 MILLILITER(S): at 09:06

## 2018-10-18 RX ADMIN — SACUBITRIL AND VALSARTAN 1 TABLET(S): 24; 26 TABLET, FILM COATED ORAL at 18:14

## 2018-10-18 RX ADMIN — SACUBITRIL AND VALSARTAN 1 TABLET(S): 24; 26 TABLET, FILM COATED ORAL at 05:05

## 2018-10-18 RX ADMIN — Medication 1 TABLET(S): at 21:20

## 2018-10-18 RX ADMIN — Medication 3 MILLILITER(S): at 04:05

## 2018-10-18 RX ADMIN — Medication 1 TABLET(S): at 05:04

## 2018-10-18 RX ADMIN — Medication 2: at 12:36

## 2018-10-18 RX ADMIN — CARVEDILOL PHOSPHATE 12.5 MILLIGRAM(S): 80 CAPSULE, EXTENDED RELEASE ORAL at 18:15

## 2018-10-18 RX ADMIN — HEPARIN SODIUM 5000 UNIT(S): 5000 INJECTION INTRAVENOUS; SUBCUTANEOUS at 05:04

## 2018-10-18 RX ADMIN — Medication 81 MILLIGRAM(S): at 12:38

## 2018-10-18 RX ADMIN — HEPARIN SODIUM 5000 UNIT(S): 5000 INJECTION INTRAVENOUS; SUBCUTANEOUS at 18:12

## 2018-10-18 RX ADMIN — Medication 3 MILLILITER(S): at 14:55

## 2018-10-18 RX ADMIN — IRON SUCROSE 210 MILLIGRAM(S): 20 INJECTION, SOLUTION INTRAVENOUS at 14:03

## 2018-10-18 RX ADMIN — Medication 2: at 18:03

## 2018-10-18 NOTE — PROGRESS NOTE ADULT - ASSESSMENT
58F hx DVT, s/p IVC filter, NICM (Cath 3/2018),  HFrEF with worsened EF Echo 8/31/2018: EF 20-25%, moderate diastolic dysfunction, moderate MR, moderate TR, pericardial effusion s/p pericardiocentesis, pericarditis,  pleural effusion s/p thoracentesis, paroxysmal atrial fibrillation not on anticoagulation due to major GIB on AC, ESRD on HD, anemia of chronic disease presented with 1 week hx of productive cough/fevers/chills/dyspnea.  Compliant with meds and hemodialysis sessions.  Hx of non-compliance with low salt diet.  CXR and CTA chest shows pulmonary edema, bilateral pleural effusions, r R > L.   Cardiology noted   HD am

## 2018-10-18 NOTE — PROGRESS NOTE ADULT - ASSESSMENT
58F hx DVT, s/p IVC filter, NICM (Cath 3/2018),  HFrEF with worsened EF Echo 8/31/2018: EF 20-25%, moderate diastolic dysfunction, moderate MR, moderate TR, pericardial effusion s/p pericardiocentesis, pericarditis,  pleural effusion s/p thoracentesis, paroxysmal atrial fibrillation not on anticoagulation due to major GIB on AC, ESRD on HD, anemia of chronic disease presented with 1 week hx of productive cough/fevers/chills/dyspnea.  Compliant with meds and hemodialysis sessions.  Hx of non-compliance with low salt diet.  CXR and CTA chest shows pulmonary edema, bilateral pleural effusions.     # NICM HFrEF LVEF 20-25% - decompensated, wet and warm. Volume removal with HD.  Multiple readmissions for decompensated systolic heart failure.  Continue carvedilol, entresto.  Avoid aldosterone inhibitor due to renal failure.  May be ICD candidate for primary prevention if LVEF remains severely depressed by December 2018. Daily standing weights.   # paroxysmal atrial fibrillation - failed AC in setting of major GI bleed, currently off AC.  Was previously referred for Watch[facility], missed appointments in the office - will reschedule.  Continue carvedilol.  # ESRD - HD as scheduled  # pleural effusions - R > L, monitor, if dyspnea persists despite volume removal, ABx, may need therapeutic thoracentesis of R sided pleural effusion  # hx of pericardial effusion s/p pericardiocentesis - with nausea/subjective fevers/chills.  May repeat TTE to assess for pericardial effusion.   # anemia - no overt bleeding, ?anemia of chronic disease, Agree with keeping Hb > 8.    # LMWH for DVT prophylaxis    Thank you for allowing me to participate in care of your patient.   I will not be here tomorrow, will ask one of my colleagues to follow her.

## 2018-10-18 NOTE — PROGRESS NOTE ADULT - SUBJECTIVE AND OBJECTIVE BOX
[facility]                                                        Office: [address] [place] [address]                                                       Telephone: [phone]. Fax:[phone]      CARDIOLOGY PROGRESS NOTE   ([facility])    Subjective:    ROS: No headache, no chest pain, no SOB, no palpitations, no dizziness, no nausea, no bleeding    Chronic Conditions:     CURRENT MEDICATIONS  carvedilol 12.5 milliGRAM(s) Oral every 12 hours  sacubitril 97 mG/valsartan 103 mG 1 Tablet(s) Oral two times a day      ALBUTerol/ipratropium for Nebulization 3 milliLiter(s) Nebulizer every 6 hours      docusate sodium 100 milliGRAM(s) Oral two times a day PRN  pantoprazole    Tablet 40 milliGRAM(s) Oral before breakfast  senna 2 Tablet(s) Oral at bedtime PRN    dextrose 40% Gel 15 Gram(s) Oral once PRN  dextrose 50% Injectable 12.5 Gram(s) IV Push once  dextrose 50% Injectable 25 Gram(s) IV Push once  dextrose 50% Injectable 25 Gram(s) IV Push once  glucagon  Injectable 1 milliGRAM(s) IntraMuscular once PRN  insulin glargine Injectable (LANTUS) 10 Unit(s) SubCutaneous at bedtime  insulin lispro (HumaLOG) corrective regimen sliding scale   SubCutaneous three times a day before meals  insulin lispro (HumaLOG) corrective regimen sliding scale   SubCutaneous at bedtime    aspirin enteric coated 81 milliGRAM(s) Oral daily  calcitriol   Capsule 0.25 MICROGram(s) Oral daily  calcium carbonate   1250 mG (OsCal) 1 Tablet(s) Oral three times a day  chlorhexidine 2% Cloths 1 Application(s) Topical <User Schedule>  dextrose 5%. 1000 milliLiter(s) IV Continuous <Continuous>  epoetin gian Injectable 31840 Unit(s) IV Push <User Schedule>  heparin  Injectable 5000 Unit(s) SubCutaneous every 12 hours  influenza   Vaccine 0.5 milliLiter(s) IntraMuscular once  iron sucrose IVPB 100 milliGRAM(s) IV Intermittent every 24 hours    	  TELEMETRY:   Vitals:  T(C): 36.9 (10-18-18 @ 15:08), Max: 37.2 (10-18-18 @ 07:43)  HR: 76 (10-18-18 @ 18:17) (71 - 85)  BP: 150/76 (10-18-18 @ 18:17) (132/74 - 158/77)  RR: 18 (10-18-18 @ 18:17) (18 - 20)  SpO2: 100% (10-18-18 @ 18:17) (89% - 100%)  Wt(kg): --  I&O's Summary      Daily T(C): 36.9 (10-18-18 @ 15:08), Max: 37.2 (10-18-18 @ 07:43)  HR: 76 (10-18-18 @ 18:17) (71 - 85)  BP: 150/76 (10-18-18 @ 18:17) (132/74 - 158/77)  RR: 18 (10-18-18 @ 18:17) (18 - 20)  SpO2: 100% (10-18-18 @ 18:17) (89% - 100%)  Wt(kg): --    Daily     PHYSICAL EXAM:  Appearance: Normal	  HEENT:   Normal oral mucosa, PERRL, EOMI	  Lymphatic: No lymphadenopathy  Cardiovascular: Normal S1 S2, No JVD, No murmurs, No edema  Respiratory: Lungs clear to auscultation	  Psychiatry: A & O x 3, Mood & affect appropriate  Gastrointestinal:  Soft, Non-tender, + BS	  Skin: No rashes, No ecchymoses, No cyanosis  Neurologic: Non-focal  Extremities: Normal range of motion, No clubbing, cyanosis or edema  Vascular: Peripheral pulses palpable 2+ bilaterally, warm      ECG (tracing reviewed by me):  	    DIAGNOSTIC TESTING:  Echocardiogram (images reviewed by me):  Catheterization:  Stress Test:    CXR (image reviewed by me):  OTHER: 	    LABS:	 	  CARDIAC MARKERS:                                  8.6    7.8   )-----------( 409      ( 18 Oct 2018 07:17 )             27.9     10-18    137  |  97<L>  |  24.0<H>  ----------------------------<  198<H>  3.7   |  28.0  |  3.27<H>    Ca    8.8      18 Oct 2018 07:17  Phos  2.3     10-18  Mg     2.0     10-17    TPro  7.3  /  Alb  3.1<L>  /  TBili  0.8  /  DBili  x   /  AST  12  /  ALT  10  /  AlkPhos  114  10-17    BNP:   Lipid Profile:   HgA1c:   TSH: [facility]                                                        Office: [address] [place] [address]                                                       Telephone: [phone]. Fax:[phone]      CARDIOLOGY PROGRESS NOTE   ([facility])    Subjective: Patient seen and examined earlier this evening.  Feels better but not back to baseline.      ROS: No headache, no chest pain, no SOB, no palpitations, no dizziness, no nausea, no bleeding    Chronic Conditions:  ESRD on HD     CURRENT MEDICATIONS  carvedilol 12.5 milliGRAM(s) Oral every 12 hours  sacubitril 97 mG/valsartan 103 mG 1 Tablet(s) Oral two times a day  ALBUTerol/ipratropium for Nebulization 3 milliLiter(s) Nebulizer every 6 hours  docusate sodium 100 milliGRAM(s) Oral two times a day PRN  pantoprazole    Tablet 40 milliGRAM(s) Oral before breakfast  senna 2 Tablet(s) Oral at bedtime PRN  dextrose 40% Gel 15 Gram(s) Oral once PRN  dextrose 50% Injectable 12.5 Gram(s) IV Push once  dextrose 50% Injectable 25 Gram(s) IV Push once  dextrose 50% Injectable 25 Gram(s) IV Push once  glucagon  Injectable 1 milliGRAM(s) IntraMuscular once PRN  insulin glargine Injectable (LANTUS) 10 Unit(s) SubCutaneous at bedtime  insulin lispro (HumaLOG) corrective regimen sliding scale   SubCutaneous three times a day before meals  insulin lispro (HumaLOG) corrective regimen sliding scale   SubCutaneous at bedtime  aspirin enteric coated 81 milliGRAM(s) Oral daily  calcitriol   Capsule 0.25 MICROGram(s) Oral daily  calcium carbonate   1250 mG (OsCal) 1 Tablet(s) Oral three times a day  chlorhexidine 2% Cloths 1 Application(s) Topical <User Schedule>  dextrose 5%. 1000 milliLiter(s) IV Continuous <Continuous>  epoetin gian Injectable 36520 Unit(s) IV Push <User Schedule>  heparin  Injectable 5000 Unit(s) SubCutaneous every 12 hours  influenza   Vaccine 0.5 milliLiter(s) IntraMuscular once  iron sucrose IVPB 100 milliGRAM(s) IV Intermittent every 24 hours  	  TELEMETRY:   Vitals:  T(C): 36.9 (10-18-18 @ 15:08), Max: 37.2 (10-18-18 @ 07:43)  HR: 76 (10-18-18 @ 18:17) (71 - 85)  BP: 150/76 (10-18-18 @ 18:17) (132/74 - 158/77)  RR: 18 (10-18-18 @ 18:17) (18 - 20)  SpO2: 100% (10-18-18 @ 18:17) (89% - 100%)  Wt(kg): --  I&O's Summary      Daily T(C): 36.9 (10-18-18 @ 15:08), Max: 37.2 (10-18-18 @ 07:43)  HR: 76 (10-18-18 @ 18:17) (71 - 85)  BP: 150/76 (10-18-18 @ 18:17) (132/74 - 158/77)  RR: 18 (10-18-18 @ 18:17) (18 - 20)  SpO2: 100% (10-18-18 @ 18:17) (89% - 100%)  Wt(kg): --    Daily     PHYSICAL EXAM:  Appearance: tachypneic	  HEENT:   Normal oral mucosa, PERRL, EOMI	  Lymphatic: No lymphadenopathy  Cardiovascular: Normal S1 S2, No JVD, III/VI systolic murmur, No edema  Respiratory: Lungs clear to auscultation	  Psychiatry: A & O x 3, Mood & affect appropriate  Gastrointestinal:  Soft, Non-tender, + BS	  Skin: No rashes, No ecchymoses, No cyanosis  Neurologic: Non-focal  Extremities: Normal range of motion, No clubbing, cyanosis or edema  Vascular: Peripheral pulses palpable 2+ bilaterally, warm      ECG (tracing reviewed by me):  	    DIAGNOSTIC TESTING:  Echocardiogram (images reviewed by me):   Catheterization:  Stress Test:    CXR (image reviewed by me):  OTHER: 	    LABS:	 	  CARDIAC MARKERS:                                  8.6    7.8   )-----------( 409      ( 18 Oct 2018 07:17 )             27.9     10-18    137  |  97<L>  |  24.0<H>  ----------------------------<  198<H>  3.7   |  28.0  |  3.27<H>    Ca    8.8      18 Oct 2018 07:17  Phos  2.3     10-18  Mg     2.0     10-17    TPro  7.3  /  Alb  3.1<L>  /  TBili  0.8  /  DBili  x   /  AST  12  /  ALT  10  /  AlkPhos  114  10-17    BNP:   Lipid Profile:   HgA1c:   TSH:

## 2018-10-18 NOTE — PROGRESS NOTE ADULT - SUBJECTIVE AND OBJECTIVE BOX
JAROCHO MORALES  58y  Female  [id number]      Patient is a 58y old  Female who presents with a chief complaint of SOB (17 Oct 2018 01:12)    HPI:  Pt is a 59 Y/O F w/ PMHx of ESRD (on HD MWF), CAD, CHF-systolic, IDDM2, HTN, HLD, paroxysmal Afib, h/o GI bleed and PNA w/ parapneumonic effusion s/p chest tube placement who presents to the ED w/ SOB and productive cough some yellow phlegm, streak of blood ?  no fever , no chills.  SOB is worse on exertion. Pt was seen by nurse at home today, on examination pt was noted to reduced breath sounds on right and was advised to come in for PNA r/o. Pt reports mild headache and denies any abd. pain. no n/v/d. no dark stools. no blood per rectum. . No other complaints. Pt was recently admitted at [facility] in 9/18 for SOB. pt. is not on long term AC for PAF for h/o gi bleed. (16 Oct 2018 21:07)  Asked to see for severe anemia.  Denies bleeding  Symptoms improved post blood transfusion  Still has cough and c/o rib pain.    PAST MEDICAL & SURGICAL HISTORY:  Coronary artery disease, angina presence unspecified, unspecified vessel or lesion type, unspecified whether native or transplanted heart  Paroxysmal atrial fibrillation  Anemia due to chronic kidney disease, unspecified CKD stage  Chronic systolic congestive heart failure  Pleural effusion  Pericardial effusion  End stage renal disease on dialysis  HLD (hyperlipidemia)  HTN (hypertension)  DM (diabetes mellitus)  A-V fistula  Encounter for dialysis catheter care    FAMILY HISTORY:  Family history of kidney disease in brother (Sibling)    REVIEW OF SYSTEMS  Generalized   weakness  Nausea  No overt bleeding  No pain  Cardiac. Neg  Respiratory.  Neg.  GI Neg.	  All other ROS is neg.      PHYSICAL EXAM:  Alert and oriented  Positive pallor  No jaundice  NO LNs  Lungs: Clear  Heart: RR  Abd: Question of splenomegaly or a left flank mass.  LEs: No CCE or DVT clinically.    CBC 10/18/18:  WBC 7.8, H/H 8.4/27.9, plt ct 409,000.  Ferritin 627, Fe 28, % sat 14, Cr 3.27; polyclonal gammopathy  CBC Full  -  ( 17 Oct 2018 06:10 )  WBC Count : 9.4 K/uL  Hemoglobin : 6.7 g/dL  Hematocrit : 23.5 %  Platelet Count - Automated : 426 K/uL  Mean Cell Volume : 87.0 fl  Mean Cell Hemoglobin : 24.8 pg  Mean Cell Hemoglobin Concentration : 28.5 g/dL  Auto Neutrophil # : 7.5 K/uL  Auto Lymphocyte # : 1.2 K/uL  Auto Monocyte # : 0.4 K/uL  Auto Eosinophil # : 0.2 K/uL  Auto Basophil # : 0.0 K/uL  Auto Neutrophil % : 79.4 %  Auto Lymphocyte % : 12.4 %  Auto Monocyte % : 4.8 %  Auto Eosinophil % : 1.8 %  Auto Basophil % : 0.3 %    PT/INR - ( 17 Oct 2018 06:10 )   PT: 12.8 sec;   INR: 1.16 ratio         PTT - ( 17 Oct 2018 06:10 )  PTT:26.6 sec  17 Oct 2018 06:10    138    |  96     |  40.0   ----------------------------<  178    3.8     |  27.0   |  4.36     Ca    8.8        17 Oct 2018 06:10  Phos  2.2       17 Oct 2018 06:10  Mg     2.0       17 Oct 2018 06:10    TPro  7.3    /  Alb  3.1    /  TBili  0.8    /  DBili  x      /  AST  12     /  ALT  10     /  AlkPhos  114    17 Oct 2018 06:10        A/P: Anemia of CRF  Also R/O Fe def.Iron studies non-diagnostic and c/w with chronic disease.      H/h and symptoms improved post  2 units of PRBCs  MARGI as deemed appropriate by renal.    Thank you.  Will follow

## 2018-10-18 NOTE — PROGRESS NOTE ADULT - SUBJECTIVE AND OBJECTIVE BOX
Patient seen and examined    Feels fine  no c/o CP SOB at rest, no NV   No swelling feet    Vital Signs Last 24 Hrs  T(C): 36.9 (18 Oct 2018 15:08), Max: 37.2 (18 Oct 2018 07:43)  T(F): 98.4 (18 Oct 2018 15:08), Max: 98.9 (18 Oct 2018 07:43)  HR: 77 (18 Oct 2018 20:30) (71 - 82)  BP: 150/76 (18 Oct 2018 18:17) (132/74 - 158/77)  BP(mean): --  RR: 18 (18 Oct 2018 18:17) (18 - 20)  SpO2: 96% (18 Oct 2018 20:30) (89% - 100%)    PHYSICAL EXAM    GENERAL: NAD,   EYES:  conjunctiva and sclera clear  NECK: Supple, No JVD/Bruit  NERVOUS SYSTEM:  A/O x3,   CHEST:  few post rales, No rhonchi  HEART:  RRR, gr 3 murmur  ABDOMEN: Soft, NT/ND BS+  EXTREMITIES:  mild Edema;  SKIN: No rashes    18 Oct 2018 07:17    137    |  97     |  24.0   ----------------------------<  198    3.7     |  28.0   |  3.27     Ca    8.8        18 Oct 2018 07:17  Phos  2.3       18 Oct 2018 07:17  Mg     2.0       17 Oct 2018 06:10    TPro  7.3    /  Alb  3.1    /  TBili  0.8    /  DBili  x      /  AST  12     /  ALT  10     /  AlkPhos  114    17 Oct 2018 06:10                          8.6    7.8   )-----------( 409      ( 18 Oct 2018 07:17 )             27.9

## 2018-10-18 NOTE — PROGRESS NOTE ADULT - SUBJECTIVE AND OBJECTIVE BOX
JAROCHO MORALES  ----------------------------------------  The patient was seen and evaluated for anemia.  The patient is in no acute distress.  Denied any chest pain, palpitations, dyspnea, or abdominal pain.  Feels better.    Vital Signs Last 24 Hrs  T(C): 36.9 (18 Oct 2018 15:08), Max: 37.2 (18 Oct 2018 07:43)  T(F): 98.4 (18 Oct 2018 15:08), Max: 98.9 (18 Oct 2018 07:43)  HR: 76 (18 Oct 2018 15:08) (71 - 85)  BP: 146/77 (18 Oct 2018 15:08) (132/74 - 158/77)  BP(mean): --  RR: 18 (18 Oct 2018 07:43) (18 - 20)  SpO2: 92% (18 Oct 2018 09:11) (89% - 98%)    CAPILLARY BLOOD GLUCOSE  POCT Blood Glucose.: 157 mg/dL (18 Oct 2018 12:24)  POCT Blood Glucose.: 168 mg/dL (18 Oct 2018 08:16)  POCT Blood Glucose.: 226 mg/dL (17 Oct 2018 23:43)  POCT Blood Glucose.: 150 mg/dL (17 Oct 2018 18:43)    PHYSICAL EXAMINATION:  ----------------------------------------  General appearance: No acute distress, Awake, Alert  HEENT: Normocephalic, Atraumatic, Conjunctiva clear, EOMI  Neck: Supple, No JVD, No tenderness  Lungs: Clear to auscultation, Breath sound equal bilaterally, No wheezes, Mild rales  Cardiovascular: S1S2, Regular rhythm  Abdomen: Soft, Nontender, Nondistended, No guarding/rebound, Positive bowel sounds  Extremities: No clubbing, No cyanosis, No edema, No calf tenderness, Left upper extremity AV fistula  Neuro: Strength equal bilaterally, No tremors  Psychiatric: Appropriate mood, Normal affect    LABORATORY STUDIES:  ----------------------------------------             8.6    7.8   )-----------( 409      ( 18 Oct 2018 07:17 )             27.9     10-18    137  |  97<L>  |  24.0<H>  ----------------------------<  198<H>  3.7   |  28.0  |  3.27<H>    Ca    8.8      18 Oct 2018 07:17  Phos  2.3     10-18  Mg     2.0     10-17    TPro  7.3  /  Alb  3.1<L>  /  TBili  0.8  /  DBili  x   /  AST  12  /  ALT  10  /  AlkPhos  114  10-17    LIVER FUNCTIONS - ( 17 Oct 2018 06:10 )  Alb: 3.1 g/dL / Pro: 7.3 g/dL / ALK PHOS: 114 U/L / ALT: 10 U/L / AST: 12 U/L / GGT: x           PT/INR - ( 17 Oct 2018 06:10 )   PT: 12.8 sec;   INR: 1.16 ratio    PTT - ( 17 Oct 2018 06:10 )  PTT:26.6 sec    Culture - Blood (collected 16 Oct 2018 15:53)  Source: .Blood Blood  Preliminary Report (18 Oct 2018 17:01):    No growth at 48 hours    Culture - Blood (collected 16 Oct 2018 15:53)  Source: .Blood Blood  Preliminary Report (18 Oct 2018 17:01):    No growth at 48 hours    MEDICATIONS  (STANDING):  ALBUTerol/ipratropium for Nebulization 3 milliLiter(s) Nebulizer every 6 hours  aspirin enteric coated 81 milliGRAM(s) Oral daily  calcitriol   Capsule 0.25 MICROGram(s) Oral daily  calcium carbonate   1250 mG (OsCal) 1 Tablet(s) Oral three times a day  carvedilol 12.5 milliGRAM(s) Oral every 12 hours  chlorhexidine 2% Cloths 1 Application(s) Topical <User Schedule>  dextrose 5%. 1000 milliLiter(s) (50 mL/Hr) IV Continuous <Continuous>  dextrose 50% Injectable 12.5 Gram(s) IV Push once  dextrose 50% Injectable 25 Gram(s) IV Push once  dextrose 50% Injectable 25 Gram(s) IV Push once  epoetin gian Injectable 25126 Unit(s) IV Push <User Schedule>  heparin  Injectable 5000 Unit(s) SubCutaneous every 12 hours  influenza   Vaccine 0.5 milliLiter(s) IntraMuscular once  insulin glargine Injectable (LANTUS) 10 Unit(s) SubCutaneous at bedtime  insulin lispro (HumaLOG) corrective regimen sliding scale   SubCutaneous three times a day before meals  insulin lispro (HumaLOG) corrective regimen sliding scale   SubCutaneous at bedtime  iron sucrose IVPB 100 milliGRAM(s) IV Intermittent every 24 hours  pantoprazole    Tablet 40 milliGRAM(s) Oral before breakfast  sacubitril 97 mG/valsartan 103 mG 1 Tablet(s) Oral two times a day    MEDICATIONS  (PRN):  dextrose 40% Gel 15 Gram(s) Oral once PRN Blood Glucose LESS THAN 70 milliGRAM(s)/deciliter  docusate sodium 100 milliGRAM(s) Oral two times a day PRN Constipation  glucagon  Injectable 1 milliGRAM(s) IntraMuscular once PRN Glucose LESS THAN 70 milligrams/deciliter  senna 2 Tablet(s) Oral at bedtime PRN Constipation      ASSESSMENT / PLAN:  ----------------------------------------  Anemia - Improved with transfusion of packed red blood cells. On iron sucrose and epoetin gian.    ESRD - Hemodialysis as scheduled.    Heart failure - On carvedilol and sacubitril/valsartan.    Diabetes - Insulin coverage, close monitoring of blood glucose levels.    Atrial fibrillation - On aspirin.    Hypertension - Close blood pressure monitoring.

## 2018-10-19 LAB
ANION GAP SERPL CALC-SCNC: 15 MMOL/L — SIGNIFICANT CHANGE UP (ref 5–17)
BUN SERPL-MCNC: 41 MG/DL — HIGH (ref 8–20)
CALCIUM SERPL-MCNC: 8.9 MG/DL — SIGNIFICANT CHANGE UP (ref 8.6–10.2)
CHLORIDE SERPL-SCNC: 93 MMOL/L — LOW (ref 98–107)
CO2 SERPL-SCNC: 26 MMOL/L — SIGNIFICANT CHANGE UP (ref 22–29)
CREAT SERPL-MCNC: 4.26 MG/DL — HIGH (ref 0.5–1.3)
GLUCOSE BLDC GLUCOMTR-MCNC: 134 MG/DL — HIGH (ref 70–99)
GLUCOSE BLDC GLUCOMTR-MCNC: 175 MG/DL — HIGH (ref 70–99)
GLUCOSE BLDC GLUCOMTR-MCNC: 179 MG/DL — HIGH (ref 70–99)
GLUCOSE BLDC GLUCOMTR-MCNC: 216 MG/DL — HIGH (ref 70–99)
GLUCOSE SERPL-MCNC: 122 MG/DL — HIGH (ref 70–115)
HCT VFR BLD CALC: 28.9 % — LOW (ref 37–47)
HGB BLD-MCNC: 9 G/DL — LOW (ref 12–16)
MCHC RBC-ENTMCNC: 27.4 PG — SIGNIFICANT CHANGE UP (ref 27–31)
MCHC RBC-ENTMCNC: 31.1 G/DL — LOW (ref 32–36)
MCV RBC AUTO: 88.1 FL — SIGNIFICANT CHANGE UP (ref 81–99)
PLATELET # BLD AUTO: 421 K/UL — HIGH (ref 150–400)
POTASSIUM SERPL-MCNC: 4 MMOL/L — SIGNIFICANT CHANGE UP (ref 3.5–5.3)
POTASSIUM SERPL-SCNC: 4 MMOL/L — SIGNIFICANT CHANGE UP (ref 3.5–5.3)
RBC # BLD: 3.28 M/UL — LOW (ref 4.4–5.2)
RBC # FLD: 18.8 % — HIGH (ref 11–15.6)
SODIUM SERPL-SCNC: 134 MMOL/L — LOW (ref 135–145)
WBC # BLD: 7.8 K/UL — SIGNIFICANT CHANGE UP (ref 4.8–10.8)
WBC # FLD AUTO: 7.8 K/UL — SIGNIFICANT CHANGE UP (ref 4.8–10.8)

## 2018-10-19 PROCEDURE — 71045 X-RAY EXAM CHEST 1 VIEW: CPT | Mod: 26

## 2018-10-19 PROCEDURE — 99232 SBSQ HOSP IP/OBS MODERATE 35: CPT

## 2018-10-19 RX ORDER — ONDANSETRON 8 MG/1
4 TABLET, FILM COATED ORAL ONCE
Qty: 0 | Refills: 0 | Status: COMPLETED | OUTPATIENT
Start: 2018-10-19 | End: 2018-10-19

## 2018-10-19 RX ADMIN — ERYTHROPOIETIN 10000 UNIT(S): 10000 INJECTION, SOLUTION INTRAVENOUS; SUBCUTANEOUS at 09:07

## 2018-10-19 RX ADMIN — CHLORHEXIDINE GLUCONATE 1 APPLICATION(S): 213 SOLUTION TOPICAL at 05:28

## 2018-10-19 RX ADMIN — Medication 1 TABLET(S): at 21:22

## 2018-10-19 RX ADMIN — Medication 3 MILLILITER(S): at 03:38

## 2018-10-19 RX ADMIN — Medication 1 TABLET(S): at 13:26

## 2018-10-19 RX ADMIN — INSULIN GLARGINE 10 UNIT(S): 100 INJECTION, SOLUTION SUBCUTANEOUS at 21:24

## 2018-10-19 RX ADMIN — HEPARIN SODIUM 5000 UNIT(S): 5000 INJECTION INTRAVENOUS; SUBCUTANEOUS at 05:27

## 2018-10-19 RX ADMIN — ONDANSETRON 4 MILLIGRAM(S): 8 TABLET, FILM COATED ORAL at 20:46

## 2018-10-19 RX ADMIN — Medication 1 TABLET(S): at 05:27

## 2018-10-19 RX ADMIN — CARVEDILOL PHOSPHATE 12.5 MILLIGRAM(S): 80 CAPSULE, EXTENDED RELEASE ORAL at 05:27

## 2018-10-19 RX ADMIN — CARVEDILOL PHOSPHATE 12.5 MILLIGRAM(S): 80 CAPSULE, EXTENDED RELEASE ORAL at 17:59

## 2018-10-19 RX ADMIN — Medication 2: at 17:54

## 2018-10-19 RX ADMIN — Medication 3 MILLILITER(S): at 21:01

## 2018-10-19 RX ADMIN — Medication 2: at 13:24

## 2018-10-19 RX ADMIN — CALCITRIOL 0.25 MICROGRAM(S): 0.5 CAPSULE ORAL at 17:53

## 2018-10-19 RX ADMIN — Medication 3 MILLILITER(S): at 14:30

## 2018-10-19 RX ADMIN — SACUBITRIL AND VALSARTAN 1 TABLET(S): 24; 26 TABLET, FILM COATED ORAL at 05:27

## 2018-10-19 RX ADMIN — IRON SUCROSE 210 MILLIGRAM(S): 20 INJECTION, SOLUTION INTRAVENOUS at 13:26

## 2018-10-19 RX ADMIN — SACUBITRIL AND VALSARTAN 1 TABLET(S): 24; 26 TABLET, FILM COATED ORAL at 17:58

## 2018-10-19 RX ADMIN — Medication 81 MILLIGRAM(S): at 17:53

## 2018-10-19 RX ADMIN — HEPARIN SODIUM 5000 UNIT(S): 5000 INJECTION INTRAVENOUS; SUBCUTANEOUS at 17:58

## 2018-10-19 RX ADMIN — PANTOPRAZOLE SODIUM 40 MILLIGRAM(S): 20 TABLET, DELAYED RELEASE ORAL at 05:27

## 2018-10-19 NOTE — PROGRESS NOTE ADULT - SUBJECTIVE AND OBJECTIVE BOX
JAROCHO MORALES  58y  Female  [id number]  FU anemia of CKD.    Patient is a 58y old  Female who presents with a chief complaint of SOB (17 Oct 2018 01:12)    HPI:  Pt is a 59 Y/O F w/ PMHx of ESRD (on HD MWF), CAD, CHF-systolic, IDDM2, HTN, HLD, paroxysmal Afib, h/o GI bleed and PNA w/ parapneumonic effusion s/p chest tube placement who presents to the ED w/ SOB and productive cough some yellow phlegm, streak of blood ?  no fever , no chills.  SOB is worse on exertion. Pt was seen by nurse at home today, on examination pt was noted to reduced breath sounds on right and was advised to come in for PNA r/o. Pt reports mild headache and denies any abd. pain. no n/v/d. no dark stools. no blood per rectum. . No other complaints. Pt was recently admitted at [facility] in 9/18 for SOB. pt. is not on long term AC for PAF for h/o gi bleed. (16 Oct 2018 21:07)  Asked to see for severe anemia initially in  ED.  Denies bleeding  Symptoms improved post blood transfusion    PAST MEDICAL & SURGICAL HISTORY:  Coronary artery disease, angina presence unspecified, unspecified vessel or lesion type, unspecified whether native or transplanted heart  Paroxysmal atrial fibrillation  Anemia due to chronic kidney disease, unspecified CKD stage  Chronic systolic congestive heart failure  Pleural effusion  Pericardial effusion  End stage renal disease on dialysis  HLD (hyperlipidemia)  HTN (hypertension)  DM (diabetes mellitus)  A-V fistula  Encounter for dialysis catheter care    FAMILY HISTORY:  Family history of kidney disease in brother (Sibling)    REVIEW OF SYSTEMS  Generalized   weakness  Nausea  No overt bleeding  No pain  Cardiac. Neg  Respiratory.  Neg.  GI Neg.	  All other ROS is neg.      PHYSICAL EXAM:  Alert and oriented  Positive pallor  No jaundice  NO LNs  Lungs: Clear  Heart: RR  Abd: Question of splenomegaly or a left flank mass.  LEs: No CCE or DVT clinically.    CBC 10/18/18:  WBC 7.8, H/H 8.4/27.9, plt ct 409,000.  Ferritin 627, Fe 28, % sat 14, Cr 3.27; polyclonal gammopathy  CBC Full  -  ( 17 Oct 2018 06:10 )  WBC Count : 9.4 K/uL  Hemoglobin : 6.7 g/dL  Hematocrit : 23.5 %  Platelet Count - Automated : 426 K/uL  Mean Cell Volume : 87.0 fl  Mean Cell Hemoglobin : 24.8 pg  Mean Cell Hemoglobin Concentration : 28.5 g/dL  Auto Neutrophil # : 7.5 K/uL  Auto Lymphocyte # : 1.2 K/uL  Auto Monocyte # : 0.4 K/uL  Auto Eosinophil # : 0.2 K/uL  Auto Basophil # : 0.0 K/uL  Auto Neutrophil % : 79.4 %  Auto Lymphocyte % : 12.4 %  Auto Monocyte % : 4.8 %  Auto Eosinophil % : 1.8 %  Auto Basophil % : 0.3 %    PT/INR - ( 17 Oct 2018 06:10 )   PT: 12.8 sec;   INR: 1.16 ratio         PTT - ( 17 Oct 2018 06:10 )  PTT:26.6 sec  17 Oct 2018 06:10    138    |  96     |  40.0   ----------------------------<  178    3.8     |  27.0   |  4.36     Ca    8.8        17 Oct 2018 06:10  Phos  2.2       17 Oct 2018 06:10  Mg     2.0       17 Oct 2018 06:10    TPro  7.3    /  Alb  3.1    /  TBili  0.8    /  DBili  x      /  AST  12     /  ALT  10     /  AlkPhos  114    17 Oct 2018 06:10    CBC Full  -  ( 19 Oct 2018 07:38 )  WBC Count : 7.8 K/uL  Hemoglobin : 9.0 g/dL  Hematocrit : 28.9 %  Platelet Count - Automated : 421 K/uL  Mean Cell Volume : 88.1 fl  Mean Cell Hemoglobin : 27.4 pg  Mean Cell Hemoglobin Concentration : 31.1 g/dL  Auto Neutrophil # : x  Auto Lymphocyte # : x  Auto Monocyte # : x  Auto Eosinophil # : x  Auto Basophil # : x  Auto Neutrophil % : x  Auto Lymphocyte % : x  Auto Monocyte % : x  Auto Eosinophil % : x  Auto Basophil % : x    19 Oct 2018 07:38    134    |  93     |  41.0   ----------------------------<  122    4.0     |  26.0   |  4.26     Ca    8.9        19 Oct 2018 07:38  Phos  2.3       18 Oct 2018 07:17    Anemia of CKD  Better post transfusion.  Suggest Procrit as deems appropriate by renal.

## 2018-10-19 NOTE — PROGRESS NOTE ADULT - SUBJECTIVE AND OBJECTIVE BOX
NEPHROLOGY INTERVAL HPI/OVERNIGHT EVENTS: No new events.    MEDICATIONS  (STANDING):  ALBUTerol/ipratropium for Nebulization 3 milliLiter(s) Nebulizer every 6 hours  aspirin enteric coated 81 milliGRAM(s) Oral daily  calcitriol   Capsule 0.25 MICROGram(s) Oral daily  calcium carbonate   1250 mG (OsCal) 1 Tablet(s) Oral three times a day  carvedilol 12.5 milliGRAM(s) Oral every 12 hours  chlorhexidine 2% Cloths 1 Application(s) Topical <User Schedule>  dextrose 5%. 1000 milliLiter(s) (50 mL/Hr) IV Continuous <Continuous>  dextrose 50% Injectable 12.5 Gram(s) IV Push once  dextrose 50% Injectable 25 Gram(s) IV Push once  dextrose 50% Injectable 25 Gram(s) IV Push once  epoetin gian Injectable 41228 Unit(s) IV Push <User Schedule>  heparin  Injectable 5000 Unit(s) SubCutaneous every 12 hours  influenza   Vaccine 0.5 milliLiter(s) IntraMuscular once  insulin glargine Injectable (LANTUS) 10 Unit(s) SubCutaneous at bedtime  insulin lispro (HumaLOG) corrective regimen sliding scale   SubCutaneous three times a day before meals  insulin lispro (HumaLOG) corrective regimen sliding scale   SubCutaneous at bedtime  iron sucrose IVPB 100 milliGRAM(s) IV Intermittent every 24 hours  pantoprazole    Tablet 40 milliGRAM(s) Oral before breakfast  sacubitril 97 mG/valsartan 103 mG 1 Tablet(s) Oral two times a day    MEDICATIONS  (PRN):  dextrose 40% Gel 15 Gram(s) Oral once PRN Blood Glucose LESS THAN 70 milliGRAM(s)/deciliter  docusate sodium 100 milliGRAM(s) Oral two times a day PRN Constipation  glucagon  Injectable 1 milliGRAM(s) IntraMuscular once PRN Glucose LESS THAN 70 milligrams/deciliter  senna 2 Tablet(s) Oral at bedtime PRN Constipation      Allergies    No Known Allergies    Intolerances        Vital Signs Last 24 Hrs  T(C): 36.8 (19 Oct 2018 07:55), Max: 36.9 (18 Oct 2018 15:08)  T(F): 98.2 (19 Oct 2018 07:55), Max: 98.4 (18 Oct 2018 15:08)  HR: 78 (19 Oct 2018 07:55) (74 - 79)  BP: 147/76 (19 Oct 2018 07:55) (144/83 - 157/88)  BP(mean): --  RR: 18 (19 Oct 2018 07:55) (18 - 18)  SpO2: 92% (19 Oct 2018 07:55) (92% - 100%)  Daily     Daily Weight in k.2 (19 Oct 2018 07:55)    PHYSICAL EXAM:    GENERAL: appears chronically ill  HEAD:  wnl  EYES:   ENMT:   NECK: veins not full  NERVOUS SYSTEM:  awake, alert  CHEST/LUNG: clear  HEART: murmur same, no rub  ABDOMEN: soft, not tender  EXTREMITIES:  left arm access with bruit, no leg edema  LYMPH:   SKIN: no rash    LABS:                        9.0    7.8   )-----------( 421      ( 19 Oct 2018 07:38 )             28.9     10-19    134<L>  |  93<L>  |  41.0<H>  ----------------------------<  122<H>  4.0   |  26.0  |  4.26<H>    Ca    8.9      19 Oct 2018 07:38  Phos  2.3     10-18                  RADIOLOGY & ADDITIONAL TESTS:

## 2018-10-19 NOTE — PROGRESS NOTE ADULT - SUBJECTIVE AND OBJECTIVE BOX
JAROCHO MORALES  ----------------------------------------  The patient was seen and evaluated for dyspnea.  The patient is in no acute distress.  Denied any chest pain, palpitations, or abdominal pain.  Some dyspnea.    Vital Signs Last 24 Hrs  T(C): 36.8 (19 Oct 2018 07:55), Max: 36.9 (18 Oct 2018 15:08)  T(F): 98.2 (19 Oct 2018 07:55), Max: 98.4 (18 Oct 2018 15:08)  HR: 78 (19 Oct 2018 07:55) (74 - 79)  BP: 147/76 (19 Oct 2018 07:55) (144/83 - 157/88)  BP(mean): --  RR: 18 (19 Oct 2018 07:55) (18 - 18)  SpO2: 92% (19 Oct 2018 07:55) (92% - 100%)    CAPILLARY BLOOD GLUCOSE  POCT Blood Glucose.: 134 mg/dL (19 Oct 2018 06:57)  POCT Blood Glucose.: 176 mg/dL (18 Oct 2018 21:18)  POCT Blood Glucose.: 160 mg/dL (18 Oct 2018 18:01)  POCT Blood Glucose.: 157 mg/dL (18 Oct 2018 12:24)    PHYSICAL EXAMINATION:  ----------------------------------------  General appearance: No acute distress, Awake, Alert  HEENT: Normocephalic, Atraumatic, Conjunctiva clear, EOMI  Neck: Supple, No JVD, No tenderness  Lungs: Clear to auscultation, Breath sound equal bilaterally, No rales, Mild wheezes  Cardiovascular: S1S2, Regular rhythm  Abdomen: Soft, Nontender, Nondistended, No guarding/rebound, Positive bowel sounds  Extremities: No clubbing, No cyanosis, No edema, No calf tenderness, Left upper extremity AV fistula  Neuro: Strength equal bilaterally, No tremors  Psychiatric: Appropriate mood, Normal affect    LABORATORY STUDIES:  ----------------------------------------             9.0    7.8   )-----------( 421      ( 19 Oct 2018 07:38 )             28.9     10-19    134<L>  |  93<L>  |  41.0<H>  ----------------------------<  122<H>  4.0   |  26.0  |  4.26<H>    Ca    8.9      19 Oct 2018 07:38  Phos  2.3     10-18    Culture - Blood (collected 16 Oct 2018 15:53)  Source: .Blood Blood  Preliminary Report (18 Oct 2018 17:01):    No growth at 48 hours    Culture - Blood (collected 16 Oct 2018 15:53)  Source: .Blood Blood  Preliminary Report (18 Oct 2018 17:01):    No growth at 48 hours    MEDICATIONS  (STANDING):  ALBUTerol/ipratropium for Nebulization 3 milliLiter(s) Nebulizer every 6 hours  aspirin enteric coated 81 milliGRAM(s) Oral daily  calcitriol   Capsule 0.25 MICROGram(s) Oral daily  calcium carbonate   1250 mG (OsCal) 1 Tablet(s) Oral three times a day  carvedilol 12.5 milliGRAM(s) Oral every 12 hours  chlorhexidine 2% Cloths 1 Application(s) Topical <User Schedule>  dextrose 5%. 1000 milliLiter(s) (50 mL/Hr) IV Continuous <Continuous>  dextrose 50% Injectable 12.5 Gram(s) IV Push once  dextrose 50% Injectable 25 Gram(s) IV Push once  dextrose 50% Injectable 25 Gram(s) IV Push once  epoetin gian Injectable 14696 Unit(s) IV Push <User Schedule>  heparin  Injectable 5000 Unit(s) SubCutaneous every 12 hours  influenza   Vaccine 0.5 milliLiter(s) IntraMuscular once  insulin glargine Injectable (LANTUS) 10 Unit(s) SubCutaneous at bedtime  insulin lispro (HumaLOG) corrective regimen sliding scale   SubCutaneous three times a day before meals  insulin lispro (HumaLOG) corrective regimen sliding scale   SubCutaneous at bedtime  iron sucrose IVPB 100 milliGRAM(s) IV Intermittent every 24 hours  pantoprazole    Tablet 40 milliGRAM(s) Oral before breakfast  sacubitril 97 mG/valsartan 103 mG 1 Tablet(s) Oral two times a day    MEDICATIONS  (PRN):  dextrose 40% Gel 15 Gram(s) Oral once PRN Blood Glucose LESS THAN 70 milliGRAM(s)/deciliter  docusate sodium 100 milliGRAM(s) Oral two times a day PRN Constipation  glucagon  Injectable 1 milliGRAM(s) IntraMuscular once PRN Glucose LESS THAN 70 milligrams/deciliter  senna 2 Tablet(s) Oral at bedtime PRN Constipation      ASSESSMENT / PLAN:  ----------------------------------------  Anemia - Improved with transfusion of packed red blood cells. On iron sucrose and epoetin gian. Hematology follow up noted.    ESRD - Hemodialysis as scheduled.    Heart failure - On carvedilol and sacubitril/valsartan. Mild persistent wheezing. Chest Xray to be repeated to evaluate.    Diabetes - Insulin coverage, close monitoring of blood glucose levels.    Atrial fibrillation - On aspirin.    Hypertension - Close blood pressure monitoring. JAROCHO MORALES  ----------------------------------------  The patient was seen and evaluated for dyspnea.  The patient is in no acute distress.  Denied any chest pain, palpitations, or abdominal pain.  Some dyspnea.    HPI:  58F presented with dyspnea and cough productive of phlegm with evidence of blood. Chest Xray noted an unchanged moderate right-sided pleural effusion, improved bilateral lower lobe infiltrates. CT of the chest noted extensive patchy bilateral centrilobular groundglass airspace disease and mild scattered interlobular septal thickening, complete passive atelectasis of the right lower lobe, resolution of right upper and middle lobe consolidated infiltrates, interval development of mild right middle lobe and left lingular parenchymal scarring with associated mild-moderate traction bronchiectasis, moderate right and small left pleural effusions, moderate-severe cardiomegaly, interval development of diffuse moderate subcutaneous edema. The patient was seen by Cardiology in consultation for heart failure. The patient was seen by Hematology in consultation for anemia and packed red blood cells were transfused.     Vital Signs Last 24 Hrs  T(C): 36.8 (19 Oct 2018 07:55), Max: 36.9 (18 Oct 2018 15:08)  T(F): 98.2 (19 Oct 2018 07:55), Max: 98.4 (18 Oct 2018 15:08)  HR: 78 (19 Oct 2018 07:55) (74 - 79)  BP: 147/76 (19 Oct 2018 07:55) (144/83 - 157/88)  BP(mean): --  RR: 18 (19 Oct 2018 07:55) (18 - 18)  SpO2: 92% (19 Oct 2018 07:55) (92% - 100%)    CAPILLARY BLOOD GLUCOSE  POCT Blood Glucose.: 134 mg/dL (19 Oct 2018 06:57)  POCT Blood Glucose.: 176 mg/dL (18 Oct 2018 21:18)  POCT Blood Glucose.: 160 mg/dL (18 Oct 2018 18:01)  POCT Blood Glucose.: 157 mg/dL (18 Oct 2018 12:24)    PHYSICAL EXAMINATION:  ----------------------------------------  General appearance: No acute distress, Awake, Alert  HEENT: Normocephalic, Atraumatic, Conjunctiva clear, EOMI  Neck: Supple, No JVD, No tenderness  Lungs: Clear to auscultation, Breath sound equal bilaterally, No rales, Mild wheezes  Cardiovascular: S1S2, Regular rhythm  Abdomen: Soft, Nontender, Nondistended, No guarding/rebound, Positive bowel sounds  Extremities: No clubbing, No cyanosis, No edema, No calf tenderness, Left upper extremity AV fistula  Neuro: Strength equal bilaterally, No tremors  Psychiatric: Appropriate mood, Normal affect    LABORATORY STUDIES:  ----------------------------------------             9.0    7.8   )-----------( 421      ( 19 Oct 2018 07:38 )             28.9     10-19    134<L>  |  93<L>  |  41.0<H>  ----------------------------<  122<H>  4.0   |  26.0  |  4.26<H>    Ca    8.9      19 Oct 2018 07:38  Phos  2.3     10-18    Culture - Blood (collected 16 Oct 2018 15:53)  Source: .Blood Blood  Preliminary Report (18 Oct 2018 17:01):    No growth at 48 hours    Culture - Blood (collected 16 Oct 2018 15:53)  Source: .Blood Blood  Preliminary Report (18 Oct 2018 17:01):    No growth at 48 hours    MEDICATIONS  (STANDING):  ALBUTerol/ipratropium for Nebulization 3 milliLiter(s) Nebulizer every 6 hours  aspirin enteric coated 81 milliGRAM(s) Oral daily  calcitriol   Capsule 0.25 MICROGram(s) Oral daily  calcium carbonate   1250 mG (OsCal) 1 Tablet(s) Oral three times a day  carvedilol 12.5 milliGRAM(s) Oral every 12 hours  chlorhexidine 2% Cloths 1 Application(s) Topical <User Schedule>  dextrose 5%. 1000 milliLiter(s) (50 mL/Hr) IV Continuous <Continuous>  dextrose 50% Injectable 12.5 Gram(s) IV Push once  dextrose 50% Injectable 25 Gram(s) IV Push once  dextrose 50% Injectable 25 Gram(s) IV Push once  epoetin gian Injectable 81449 Unit(s) IV Push <User Schedule>  heparin  Injectable 5000 Unit(s) SubCutaneous every 12 hours  influenza   Vaccine 0.5 milliLiter(s) IntraMuscular once  insulin glargine Injectable (LANTUS) 10 Unit(s) SubCutaneous at bedtime  insulin lispro (HumaLOG) corrective regimen sliding scale   SubCutaneous three times a day before meals  insulin lispro (HumaLOG) corrective regimen sliding scale   SubCutaneous at bedtime  iron sucrose IVPB 100 milliGRAM(s) IV Intermittent every 24 hours  pantoprazole    Tablet 40 milliGRAM(s) Oral before breakfast  sacubitril 97 mG/valsartan 103 mG 1 Tablet(s) Oral two times a day    MEDICATIONS  (PRN):  dextrose 40% Gel 15 Gram(s) Oral once PRN Blood Glucose LESS THAN 70 milliGRAM(s)/deciliter  docusate sodium 100 milliGRAM(s) Oral two times a day PRN Constipation  glucagon  Injectable 1 milliGRAM(s) IntraMuscular once PRN Glucose LESS THAN 70 milligrams/deciliter  senna 2 Tablet(s) Oral at bedtime PRN Constipation      ASSESSMENT / PLAN:  ----------------------------------------  Anemia - Improved with transfusion of packed red blood cells. On iron sucrose and epoetin gian. Hematology follow up noted.    ESRD - Hemodialysis as scheduled.    Heart failure - On carvedilol and sacubitril/valsartan. Mild persistent wheezing. Chest Xray to be repeated to evaluate.    Diabetes - Insulin coverage, close monitoring of blood glucose levels.    Atrial fibrillation - On aspirin.    Hypertension - Close blood pressure monitoring.

## 2018-10-20 LAB
ANION GAP SERPL CALC-SCNC: 12 MMOL/L — SIGNIFICANT CHANGE UP (ref 5–17)
ANION GAP SERPL CALC-SCNC: 14 MMOL/L — SIGNIFICANT CHANGE UP (ref 5–17)
BUN SERPL-MCNC: 36 MG/DL — HIGH (ref 8–20)
BUN SERPL-MCNC: 38 MG/DL — HIGH (ref 8–20)
CALCIUM SERPL-MCNC: 8.5 MG/DL — LOW (ref 8.6–10.2)
CALCIUM SERPL-MCNC: 8.8 MG/DL — SIGNIFICANT CHANGE UP (ref 8.6–10.2)
CHLORIDE SERPL-SCNC: 93 MMOL/L — LOW (ref 98–107)
CHLORIDE SERPL-SCNC: 94 MMOL/L — LOW (ref 98–107)
CO2 SERPL-SCNC: 28 MMOL/L — SIGNIFICANT CHANGE UP (ref 22–29)
CO2 SERPL-SCNC: 28 MMOL/L — SIGNIFICANT CHANGE UP (ref 22–29)
CREAT SERPL-MCNC: 3.77 MG/DL — HIGH (ref 0.5–1.3)
CREAT SERPL-MCNC: 4.06 MG/DL — HIGH (ref 0.5–1.3)
CULTURE RESULTS: SIGNIFICANT CHANGE UP
GLUCOSE BLDC GLUCOMTR-MCNC: 128 MG/DL — HIGH (ref 70–99)
GLUCOSE BLDC GLUCOMTR-MCNC: 134 MG/DL — HIGH (ref 70–99)
GLUCOSE BLDC GLUCOMTR-MCNC: 214 MG/DL — HIGH (ref 70–99)
GLUCOSE BLDC GLUCOMTR-MCNC: 341 MG/DL — HIGH (ref 70–99)
GLUCOSE SERPL-MCNC: 112 MG/DL — SIGNIFICANT CHANGE UP (ref 70–115)
GLUCOSE SERPL-MCNC: 169 MG/DL — HIGH (ref 70–115)
HCT VFR BLD CALC: 31.2 % — LOW (ref 37–47)
HGB BLD-MCNC: 8.7 G/DL — LOW (ref 12–16)
MCHC RBC-ENTMCNC: 25.4 PG — LOW (ref 27–31)
MCHC RBC-ENTMCNC: 27.9 G/DL — LOW (ref 32–36)
MCV RBC AUTO: 91 FL — SIGNIFICANT CHANGE UP (ref 81–99)
PLATELET # BLD AUTO: 381 K/UL — SIGNIFICANT CHANGE UP (ref 150–400)
POTASSIUM SERPL-MCNC: 4.3 MMOL/L — SIGNIFICANT CHANGE UP (ref 3.5–5.3)
POTASSIUM SERPL-MCNC: 4.4 MMOL/L — SIGNIFICANT CHANGE UP (ref 3.5–5.3)
POTASSIUM SERPL-SCNC: 4.3 MMOL/L — SIGNIFICANT CHANGE UP (ref 3.5–5.3)
POTASSIUM SERPL-SCNC: 4.4 MMOL/L — SIGNIFICANT CHANGE UP (ref 3.5–5.3)
PROCALCITONIN SERPL-MCNC: 0.67 NG/ML — HIGH (ref 0–0.04)
RBC # BLD: 3.43 M/UL — LOW (ref 4.4–5.2)
RBC # FLD: 19.3 % — HIGH (ref 11–15.6)
SODIUM SERPL-SCNC: 134 MMOL/L — LOW (ref 135–145)
SODIUM SERPL-SCNC: 135 MMOL/L — SIGNIFICANT CHANGE UP (ref 135–145)
SPECIMEN SOURCE: SIGNIFICANT CHANGE UP
WBC # BLD: 5.8 K/UL — SIGNIFICANT CHANGE UP (ref 4.8–10.8)
WBC # FLD AUTO: 5.8 K/UL — SIGNIFICANT CHANGE UP (ref 4.8–10.8)

## 2018-10-20 PROCEDURE — 99233 SBSQ HOSP IP/OBS HIGH 50: CPT

## 2018-10-20 RX ORDER — PIPERACILLIN AND TAZOBACTAM 4; .5 G/20ML; G/20ML
INJECTION, POWDER, LYOPHILIZED, FOR SOLUTION INTRAVENOUS
Qty: 0 | Refills: 0 | Status: DISCONTINUED | OUTPATIENT
Start: 2018-10-20 | End: 2018-10-20

## 2018-10-20 RX ADMIN — HEPARIN SODIUM 5000 UNIT(S): 5000 INJECTION INTRAVENOUS; SUBCUTANEOUS at 06:20

## 2018-10-20 RX ADMIN — SACUBITRIL AND VALSARTAN 1 TABLET(S): 24; 26 TABLET, FILM COATED ORAL at 06:19

## 2018-10-20 RX ADMIN — Medication 81 MILLIGRAM(S): at 18:12

## 2018-10-20 RX ADMIN — Medication 1 TABLET(S): at 21:46

## 2018-10-20 RX ADMIN — Medication 3 MILLILITER(S): at 20:31

## 2018-10-20 RX ADMIN — CALCITRIOL 0.25 MICROGRAM(S): 0.5 CAPSULE ORAL at 18:12

## 2018-10-20 RX ADMIN — CARVEDILOL PHOSPHATE 12.5 MILLIGRAM(S): 80 CAPSULE, EXTENDED RELEASE ORAL at 06:20

## 2018-10-20 RX ADMIN — Medication 1 TABLET(S): at 18:12

## 2018-10-20 RX ADMIN — IRON SUCROSE 210 MILLIGRAM(S): 20 INJECTION, SOLUTION INTRAVENOUS at 15:10

## 2018-10-20 RX ADMIN — Medication 3 MILLILITER(S): at 08:59

## 2018-10-20 RX ADMIN — Medication 1 TABLET(S): at 06:21

## 2018-10-20 RX ADMIN — Medication 4: at 08:56

## 2018-10-20 RX ADMIN — CARVEDILOL PHOSPHATE 12.5 MILLIGRAM(S): 80 CAPSULE, EXTENDED RELEASE ORAL at 18:13

## 2018-10-20 RX ADMIN — PANTOPRAZOLE SODIUM 40 MILLIGRAM(S): 20 TABLET, DELAYED RELEASE ORAL at 06:19

## 2018-10-20 RX ADMIN — CHLORHEXIDINE GLUCONATE 1 APPLICATION(S): 213 SOLUTION TOPICAL at 06:20

## 2018-10-20 RX ADMIN — Medication 8: at 18:15

## 2018-10-20 RX ADMIN — Medication 3 MILLILITER(S): at 03:15

## 2018-10-20 RX ADMIN — HEPARIN SODIUM 5000 UNIT(S): 5000 INJECTION INTRAVENOUS; SUBCUTANEOUS at 18:15

## 2018-10-20 RX ADMIN — SACUBITRIL AND VALSARTAN 1 TABLET(S): 24; 26 TABLET, FILM COATED ORAL at 21:46

## 2018-10-20 RX ADMIN — INSULIN GLARGINE 10 UNIT(S): 100 INJECTION, SOLUTION SUBCUTANEOUS at 21:46

## 2018-10-20 NOTE — PROGRESS NOTE ADULT - ASSESSMENT
58F presented with dyspnea and cough productive of phlegm with evidence of blood. Chest Xray noted an unchanged moderate right-sided pleural effusion, improved bilateral lower lobe infiltrates. CT of the chest noted extensive patchy bilateral centrilobular groundglass airspace disease and mild scattered interlobular septal thickening, complete passive atelectasis of the right lower lobe, resolution of right upper and middle lobe consolidated infiltrates, interval development of mild right middle lobe and left lingular parenchymal scarring with associated mild-moderate traction bronchiectasis, moderate right and small left pleural effusions, moderate-severe cardiomegaly, interval development of diffuse moderate subcutaneous edema. The patient was seen by Cardiology in consultation for heart failure. The patient was seen by Hematology in consultation for anemia and packed red blood cells were transfused.       ASSESSMENT / PLAN:  ----------------------------------------  Anemia - Improved with transfusion of packed red blood cells. On iron sucrose and epoetin gian. Hematology follow up noted.    ESRD - Hemodialysis as scheduled.    Heart failure - On carvedilol and sacubitril/valsartan.     Abnormal CXR- Mild persistent wheezing despite adequate HD. Repeat Chest Xray with Increased diffuse airspace disease and increased right pleural effusion. Pulmonary edema is suspected although cannot exclude pneumonia. Pt non productive cough & desaturations on . NO fevers recoorded.   CT chest: Moderate-severe CHF/fluid overload with marked cardiomegaly, interstitial/pulmonary alveolar edema, moderate right and small left pleural effusions, and anasarca.Complete passive atelectasis of the right lower lobe. Minimal left basilar passive atelectasis.Interval development of chronic scarring of the right middle lobe and left lingula with associated traction bronchiectasis suggesting sequela of mycobacterium avium complex infection. Small amount of of upper abdominal ascites.  ID to see pt in am  Afebrile, no leukocytosis, hold off on abx for now      Diabetes - Insulin coverage, close monitoring of blood glucose levels.    Atrial fibrillation - On aspirin.    Hypertension - Close blood pressure monitoring.

## 2018-10-20 NOTE — PROGRESS NOTE ADULT - SUBJECTIVE AND OBJECTIVE BOX
CHIEF COMPLAINT/INTERVAL HISTORY:    Patient is a 58y old  Female who presents with a chief complaint of SOB (20 Oct 2018 09:33)      HPI:  Pt is a 59 Y/O F w/ PMHx of ESRD (on HD MWF), CAD, CHF-systolic, IDDM2, HTN, HLD, paroxysmal Afib, h/o GI bleed and PNA w/ parapneumonic effusion s/p chest tube placement who presents to the ED w/ SOB and productive cough some yellow phlegm, streak of blood ?  no fever , no chills.  SOB is worse on exertion. Pt was seen by nurse at home today, on examination pt was noted to reduced breath sounds on right and was advised to come in for PNA r/o. Pt reports mild headache and denies any abd. pain. no n/v/d. no dark stools. no blood per rectum. . No other complaints. Pt was recently admitted at [facility] in 9/18 for SOB. pt. is not on long term AC for PAF for h/o gi bleed. (16 Oct 2018 21:07)      SUBJECTIVE & OBJECTIVE/ ROS: Pt seen and examined at bedside. Translation Uzbek by RN. c/o non productive cough & desaturations on . Worsening infiltrates on CXR. NO fevers recorded    ICU Vital Signs Last 24 Hrs  T(C): 37.1 (20 Oct 2018 16:00), Max: 37.4 (19 Oct 2018 23:59)  T(F): 98.8 (20 Oct 2018 16:00), Max: 99.4 (19 Oct 2018 23:59)  HR: 81 (20 Oct 2018 16:00) (70 - 81)  BP: 121/66 (20 Oct 2018 16:00) (111/65 - 138/80)  BP(mean): --  ABP: --  ABP(mean): --  RR: 18 (20 Oct 2018 16:00) (18 - 18)  SpO2: 90% (20 Oct 2018 16:00) (90% - 96%)        MEDICATIONS  (STANDING):  ALBUTerol/ipratropium for Nebulization 3 milliLiter(s) Nebulizer every 6 hours  aspirin enteric coated 81 milliGRAM(s) Oral daily  calcitriol   Capsule 0.25 MICROGram(s) Oral daily  calcium carbonate   1250 mG (OsCal) 1 Tablet(s) Oral three times a day  carvedilol 12.5 milliGRAM(s) Oral every 12 hours  chlorhexidine 2% Cloths 1 Application(s) Topical <User Schedule>  dextrose 5%. 1000 milliLiter(s) (50 mL/Hr) IV Continuous <Continuous>  dextrose 50% Injectable 12.5 Gram(s) IV Push once  dextrose 50% Injectable 25 Gram(s) IV Push once  dextrose 50% Injectable 25 Gram(s) IV Push once  epoetin gian Injectable 80649 Unit(s) IV Push <User Schedule>  heparin  Injectable 5000 Unit(s) SubCutaneous every 12 hours  influenza   Vaccine 0.5 milliLiter(s) IntraMuscular once  insulin glargine Injectable (LANTUS) 10 Unit(s) SubCutaneous at bedtime  insulin lispro (HumaLOG) corrective regimen sliding scale   SubCutaneous three times a day before meals  insulin lispro (HumaLOG) corrective regimen sliding scale   SubCutaneous at bedtime  iron sucrose IVPB 100 milliGRAM(s) IV Intermittent every 24 hours  pantoprazole    Tablet 40 milliGRAM(s) Oral before breakfast  piperacillin/tazobactam IVPB.      sacubitril 97 mG/valsartan 103 mG 1 Tablet(s) Oral two times a day    MEDICATIONS  (PRN):  dextrose 40% Gel 15 Gram(s) Oral once PRN Blood Glucose LESS THAN 70 milliGRAM(s)/deciliter  docusate sodium 100 milliGRAM(s) Oral two times a day PRN Constipation  glucagon  Injectable 1 milliGRAM(s) IntraMuscular once PRN Glucose LESS THAN 70 milligrams/deciliter  senna 2 Tablet(s) Oral at bedtime PRN Constipation      LABS:                        8.7    5.8   )-----------( 381      ( 20 Oct 2018 07:18 )             31.2     10-20    135  |  93<L>  |  38.0<H>  ----------------------------<  112  4.3   |  28.0  |  4.06<H>    Ca    8.8      20 Oct 2018 12:58            CAPILLARY BLOOD GLUCOSE      POCT Blood Glucose.: 341 mg/dL (20 Oct 2018 18:15)  POCT Blood Glucose.: 128 mg/dL (20 Oct 2018 12:36)  POCT Blood Glucose.: 214 mg/dL (20 Oct 2018 08:55)  POCT Blood Glucose.: 216 mg/dL (19 Oct 2018 21:20)      RECENT CULTURES:      RADIOLOGY & ADDITIONAL TESTS:      PHYSICAL EXAM:    General appearance: No acute distress, Awake, Alert  HEENT: Normocephalic, Atraumatic, Conjunctiva clear, EOMI  Neck: Supple, No JVD, No tenderness  Lungs: b/l rhonchi  Cardiovascular: S1S2, Regular rhythm  Abdomen: Soft, Nontender, Nondistended, No guarding/rebound, Positive bowel sounds  Extremities: No clubbing, No cyanosis, No edema, No calf tenderness, Left upper extremity AV fistula  Neuro: Strength equal bilaterally, No tremors  Psychiatric: Appropriate mood, Normal affect

## 2018-10-20 NOTE — PROGRESS NOTE ADULT - SUBJECTIVE AND OBJECTIVE BOX
NEPHROLOGY INTERVAL HPI/OVERNIGHT EVENTS: No new events.    MEDICATIONS  (STANDING):  ALBUTerol/ipratropium for Nebulization 3 milliLiter(s) Nebulizer every 6 hours  aspirin enteric coated 81 milliGRAM(s) Oral daily  calcitriol   Capsule 0.25 MICROGram(s) Oral daily  calcium carbonate   1250 mG (OsCal) 1 Tablet(s) Oral three times a day  carvedilol 12.5 milliGRAM(s) Oral every 12 hours  chlorhexidine 2% Cloths 1 Application(s) Topical <User Schedule>  dextrose 5%. 1000 milliLiter(s) (50 mL/Hr) IV Continuous <Continuous>  dextrose 50% Injectable 12.5 Gram(s) IV Push once  dextrose 50% Injectable 25 Gram(s) IV Push once  dextrose 50% Injectable 25 Gram(s) IV Push once  epoetin gian Injectable 99117 Unit(s) IV Push <User Schedule>  heparin  Injectable 5000 Unit(s) SubCutaneous every 12 hours  influenza   Vaccine 0.5 milliLiter(s) IntraMuscular once  insulin glargine Injectable (LANTUS) 10 Unit(s) SubCutaneous at bedtime  insulin lispro (HumaLOG) corrective regimen sliding scale   SubCutaneous three times a day before meals  insulin lispro (HumaLOG) corrective regimen sliding scale   SubCutaneous at bedtime  iron sucrose IVPB 100 milliGRAM(s) IV Intermittent every 24 hours  pantoprazole    Tablet 40 milliGRAM(s) Oral before breakfast  sacubitril 97 mG/valsartan 103 mG 1 Tablet(s) Oral two times a day    MEDICATIONS  (PRN):  dextrose 40% Gel 15 Gram(s) Oral once PRN Blood Glucose LESS THAN 70 milliGRAM(s)/deciliter  docusate sodium 100 milliGRAM(s) Oral two times a day PRN Constipation  glucagon  Injectable 1 milliGRAM(s) IntraMuscular once PRN Glucose LESS THAN 70 milligrams/deciliter  senna 2 Tablet(s) Oral at bedtime PRN Constipation      Allergies    No Known Allergies    Intolerances        Vital Signs Last 24 Hrs    T(C): 37.1 (20 Oct 2018 07:47), Max: 37.4 (19 Oct 2018 23:59)  T(F): 98.7 (20 Oct 2018 07:47), Max: 99.4 (19 Oct 2018 23:59)  HR: 78 (20 Oct 2018 09:00) (70 - 90)  BP: 111/65 (20 Oct 2018 07:47) (111/65 - 156/78)  BP(mean): --  RR: 18 (20 Oct 2018 07:47) (18 - 18)  SpO2: 94% (19 Oct 2018 23:59) (93% - 96%)  T(C): 36.8 (19 Oct 2018 07:55), Max: 36.9 (18 Oct 2018 15:08)  T(F): 98.2 (19 Oct 2018 07:55), Max: 98.4 (18 Oct 2018 15:08)  HR: 78 (19 Oct 2018 07:55) (74 - 79)  BP: 147/76 (19 Oct 2018 07:55) (144/83 - 157/88)  BP(mean): --  RR: 18 (19 Oct 2018 07:55) (18 - 18)  SpO2: 92% (19 Oct 2018 07:55) (92% - 100%)  Daily     Daily Weight in k.2 (19 Oct 2018 07:55)    PHYSICAL EXAM:    GENERAL: appears chronically ill  HEAD:  wnl  EYES:   ENMT:   NECK: veins not full  NERVOUS SYSTEM:  awake, alert  CHEST/LUNG: rales right base  HEART: murmur same, no rub  ABDOMEN: soft, not tender  EXTREMITIES:  left arm access with bruit, no leg edema  LYMPH:   SKIN: no rash    LABS:    10-20    134<L>  |  94<L>  |  36.0<H>  ----------------------------<  169<H>  4.4   |  28.0  |  3.77<H>    Ca    8.5<L>      20 Oct 2018 07:18                            9.0    7.8   )-----------( 421      ( 19 Oct 2018 07:38 )             28.9     10    134<L>  |  93<L>  |  41.0<H>  ----------------------------<  122<H>  4.0   |  26.0  |  4.26<H>    Ca    8.9      19 Oct 2018 07:38  Phos  2.3     1018                  RADIOLOGY & ADDITIONAL TESTS:

## 2018-10-21 DIAGNOSIS — I10 ESSENTIAL (PRIMARY) HYPERTENSION: ICD-10-CM

## 2018-10-21 DIAGNOSIS — J90 PLEURAL EFFUSION, NOT ELSEWHERE CLASSIFIED: ICD-10-CM

## 2018-10-21 DIAGNOSIS — E11.65 TYPE 2 DIABETES MELLITUS WITH HYPERGLYCEMIA: ICD-10-CM

## 2018-10-21 DIAGNOSIS — I50.22 CHRONIC SYSTOLIC (CONGESTIVE) HEART FAILURE: ICD-10-CM

## 2018-10-21 DIAGNOSIS — R50.9 FEVER, UNSPECIFIED: ICD-10-CM

## 2018-10-21 LAB
ALBUMIN SERPL ELPH-MCNC: 2.9 G/DL — LOW (ref 3.3–5.2)
ALP SERPL-CCNC: 130 U/L — HIGH (ref 40–120)
ALT FLD-CCNC: 8 U/L — SIGNIFICANT CHANGE UP
ANION GAP SERPL CALC-SCNC: 11 MMOL/L — SIGNIFICANT CHANGE UP (ref 5–17)
ANION GAP SERPL CALC-SCNC: 12 MMOL/L — SIGNIFICANT CHANGE UP (ref 5–17)
AST SERPL-CCNC: 19 U/L — SIGNIFICANT CHANGE UP
BASOPHILS # BLD AUTO: 0 K/UL — SIGNIFICANT CHANGE UP (ref 0–0.2)
BASOPHILS # BLD AUTO: 0 K/UL — SIGNIFICANT CHANGE UP (ref 0–0.2)
BASOPHILS NFR BLD AUTO: 0.2 % — SIGNIFICANT CHANGE UP (ref 0–2)
BASOPHILS NFR BLD AUTO: 0.2 % — SIGNIFICANT CHANGE UP (ref 0–2)
BILIRUB SERPL-MCNC: 0.7 MG/DL — SIGNIFICANT CHANGE UP (ref 0.4–2)
BUN SERPL-MCNC: 27 MG/DL — HIGH (ref 8–20)
BUN SERPL-MCNC: 32 MG/DL — HIGH (ref 8–20)
CALCIUM SERPL-MCNC: 8.6 MG/DL — SIGNIFICANT CHANGE UP (ref 8.6–10.2)
CALCIUM SERPL-MCNC: 8.8 MG/DL — SIGNIFICANT CHANGE UP (ref 8.6–10.2)
CHLORIDE SERPL-SCNC: 94 MMOL/L — LOW (ref 98–107)
CHLORIDE SERPL-SCNC: 95 MMOL/L — LOW (ref 98–107)
CO2 SERPL-SCNC: 28 MMOL/L — SIGNIFICANT CHANGE UP (ref 22–29)
CO2 SERPL-SCNC: 28 MMOL/L — SIGNIFICANT CHANGE UP (ref 22–29)
CREAT SERPL-MCNC: 3.28 MG/DL — HIGH (ref 0.5–1.3)
CREAT SERPL-MCNC: 3.62 MG/DL — HIGH (ref 0.5–1.3)
CULTURE RESULTS: SIGNIFICANT CHANGE UP
CULTURE RESULTS: SIGNIFICANT CHANGE UP
EOSINOPHIL # BLD AUTO: 0.1 K/UL — SIGNIFICANT CHANGE UP (ref 0–0.5)
EOSINOPHIL # BLD AUTO: 0.2 K/UL — SIGNIFICANT CHANGE UP (ref 0–0.5)
EOSINOPHIL NFR BLD AUTO: 1.9 % — SIGNIFICANT CHANGE UP (ref 0–6)
EOSINOPHIL NFR BLD AUTO: 3.1 % — SIGNIFICANT CHANGE UP (ref 0–6)
GLUCOSE BLDC GLUCOMTR-MCNC: 127 MG/DL — HIGH (ref 70–99)
GLUCOSE BLDC GLUCOMTR-MCNC: 192 MG/DL — HIGH (ref 70–99)
GLUCOSE BLDC GLUCOMTR-MCNC: 217 MG/DL — HIGH (ref 70–99)
GLUCOSE BLDC GLUCOMTR-MCNC: 234 MG/DL — HIGH (ref 70–99)
GLUCOSE SERPL-MCNC: 134 MG/DL — HIGH (ref 70–115)
GLUCOSE SERPL-MCNC: 154 MG/DL — HIGH (ref 70–115)
HBV SURFACE AG SER-ACNC: SIGNIFICANT CHANGE UP
HCT VFR BLD CALC: 27.1 % — LOW (ref 37–47)
HCT VFR BLD CALC: 27.7 % — LOW (ref 37–47)
HGB BLD-MCNC: 7.8 G/DL — LOW (ref 12–16)
HGB BLD-MCNC: 8.1 G/DL — LOW (ref 12–16)
LACTATE BLDV-MCNC: 1.4 MMOL/L — SIGNIFICANT CHANGE UP (ref 0.5–2)
LYMPHOCYTES # BLD AUTO: 0.7 K/UL — LOW (ref 1–4.8)
LYMPHOCYTES # BLD AUTO: 0.9 K/UL — LOW (ref 1–4.8)
LYMPHOCYTES # BLD AUTO: 11.8 % — LOW (ref 20–55)
LYMPHOCYTES # BLD AUTO: 16 % — LOW (ref 20–55)
MAGNESIUM SERPL-MCNC: 1.9 MG/DL — SIGNIFICANT CHANGE UP (ref 1.6–2.6)
MCHC RBC-ENTMCNC: 26.3 PG — LOW (ref 27–31)
MCHC RBC-ENTMCNC: 26.6 PG — LOW (ref 27–31)
MCHC RBC-ENTMCNC: 28.8 G/DL — LOW (ref 32–36)
MCHC RBC-ENTMCNC: 29.2 G/DL — LOW (ref 32–36)
MCV RBC AUTO: 91.1 FL — SIGNIFICANT CHANGE UP (ref 81–99)
MCV RBC AUTO: 91.2 FL — SIGNIFICANT CHANGE UP (ref 81–99)
MONOCYTES # BLD AUTO: 0.5 K/UL — SIGNIFICANT CHANGE UP (ref 0–0.8)
MONOCYTES # BLD AUTO: 0.5 K/UL — SIGNIFICANT CHANGE UP (ref 0–0.8)
MONOCYTES NFR BLD AUTO: 7.3 % — SIGNIFICANT CHANGE UP (ref 3–10)
MONOCYTES NFR BLD AUTO: 8.6 % — SIGNIFICANT CHANGE UP (ref 3–10)
NEUTROPHILS # BLD AUTO: 4 K/UL — SIGNIFICANT CHANGE UP (ref 1.8–8)
NEUTROPHILS # BLD AUTO: 5 K/UL — SIGNIFICANT CHANGE UP (ref 1.8–8)
NEUTROPHILS NFR BLD AUTO: 71.7 % — SIGNIFICANT CHANGE UP (ref 37–73)
NEUTROPHILS NFR BLD AUTO: 78.6 % — HIGH (ref 37–73)
PHOSPHATE SERPL-MCNC: 3.2 MG/DL — SIGNIFICANT CHANGE UP (ref 2.4–4.7)
PLATELET # BLD AUTO: 353 K/UL — SIGNIFICANT CHANGE UP (ref 150–400)
PLATELET # BLD AUTO: 379 K/UL — SIGNIFICANT CHANGE UP (ref 150–400)
POTASSIUM SERPL-MCNC: 4.4 MMOL/L — SIGNIFICANT CHANGE UP (ref 3.5–5.3)
POTASSIUM SERPL-MCNC: 4.7 MMOL/L — SIGNIFICANT CHANGE UP (ref 3.5–5.3)
POTASSIUM SERPL-SCNC: 4.4 MMOL/L — SIGNIFICANT CHANGE UP (ref 3.5–5.3)
POTASSIUM SERPL-SCNC: 4.7 MMOL/L — SIGNIFICANT CHANGE UP (ref 3.5–5.3)
PROCALCITONIN SERPL-MCNC: 0.7 NG/ML — HIGH (ref 0–0.04)
PROT SERPL-MCNC: 6.9 G/DL — SIGNIFICANT CHANGE UP (ref 6.6–8.7)
RBC # BLD: 2.97 M/UL — LOW (ref 4.4–5.2)
RBC # BLD: 3.04 M/UL — LOW (ref 4.4–5.2)
RBC # FLD: 18.9 % — HIGH (ref 11–15.6)
RBC # FLD: 19 % — HIGH (ref 11–15.6)
SODIUM SERPL-SCNC: 133 MMOL/L — LOW (ref 135–145)
SODIUM SERPL-SCNC: 135 MMOL/L — SIGNIFICANT CHANGE UP (ref 135–145)
SPECIMEN SOURCE: SIGNIFICANT CHANGE UP
SPECIMEN SOURCE: SIGNIFICANT CHANGE UP
WBC # BLD: 5.6 K/UL — SIGNIFICANT CHANGE UP (ref 4.8–10.8)
WBC # BLD: 6.3 K/UL — SIGNIFICANT CHANGE UP (ref 4.8–10.8)
WBC # FLD AUTO: 5.6 K/UL — SIGNIFICANT CHANGE UP (ref 4.8–10.8)
WBC # FLD AUTO: 6.3 K/UL — SIGNIFICANT CHANGE UP (ref 4.8–10.8)

## 2018-10-21 PROCEDURE — 99222 1ST HOSP IP/OBS MODERATE 55: CPT

## 2018-10-21 PROCEDURE — 99233 SBSQ HOSP IP/OBS HIGH 50: CPT

## 2018-10-21 PROCEDURE — 71045 X-RAY EXAM CHEST 1 VIEW: CPT | Mod: 26,77

## 2018-10-21 PROCEDURE — 99231 SBSQ HOSP IP/OBS SF/LOW 25: CPT

## 2018-10-21 PROCEDURE — 71045 X-RAY EXAM CHEST 1 VIEW: CPT | Mod: 26

## 2018-10-21 RX ORDER — IPRATROPIUM/ALBUTEROL SULFATE 18-103MCG
3 AEROSOL WITH ADAPTER (GRAM) INHALATION EVERY 6 HOURS
Qty: 0 | Refills: 0 | Status: DISCONTINUED | OUTPATIENT
Start: 2018-10-21 | End: 2018-10-24

## 2018-10-21 RX ORDER — VANCOMYCIN HCL 1 G
1000 VIAL (EA) INTRAVENOUS ONCE
Qty: 0 | Refills: 0 | Status: COMPLETED | OUTPATIENT
Start: 2018-10-21 | End: 2018-10-21

## 2018-10-21 RX ORDER — ALBUTEROL 90 UG/1
1 AEROSOL, METERED ORAL EVERY 4 HOURS
Qty: 0 | Refills: 0 | Status: DISCONTINUED | OUTPATIENT
Start: 2018-10-21 | End: 2018-10-24

## 2018-10-21 RX ORDER — ACETAMINOPHEN 500 MG
1000 TABLET ORAL ONCE
Qty: 0 | Refills: 0 | Status: COMPLETED | OUTPATIENT
Start: 2018-10-21 | End: 2018-10-21

## 2018-10-21 RX ORDER — TIOTROPIUM BROMIDE 18 UG/1
1 CAPSULE ORAL; RESPIRATORY (INHALATION) DAILY
Qty: 0 | Refills: 0 | Status: DISCONTINUED | OUTPATIENT
Start: 2018-10-21 | End: 2018-10-24

## 2018-10-21 RX ORDER — ACETAMINOPHEN 500 MG
650 TABLET ORAL ONCE
Qty: 0 | Refills: 0 | Status: COMPLETED | OUTPATIENT
Start: 2018-10-21 | End: 2018-10-21

## 2018-10-21 RX ORDER — CEFEPIME 1 G/1
1000 INJECTION, POWDER, FOR SOLUTION INTRAMUSCULAR; INTRAVENOUS EVERY 24 HOURS
Qty: 0 | Refills: 0 | Status: DISCONTINUED | OUTPATIENT
Start: 2018-10-21 | End: 2018-10-24

## 2018-10-21 RX ORDER — CEFEPIME 1 G/1
1000 INJECTION, POWDER, FOR SOLUTION INTRAMUSCULAR; INTRAVENOUS ONCE
Qty: 0 | Refills: 0 | Status: COMPLETED | OUTPATIENT
Start: 2018-10-21 | End: 2018-10-21

## 2018-10-21 RX ORDER — CEFEPIME 1 G/1
INJECTION, POWDER, FOR SOLUTION INTRAMUSCULAR; INTRAVENOUS
Qty: 0 | Refills: 0 | Status: DISCONTINUED | OUTPATIENT
Start: 2018-10-21 | End: 2018-10-21

## 2018-10-21 RX ADMIN — CEFEPIME 100 MILLIGRAM(S): 1 INJECTION, POWDER, FOR SOLUTION INTRAMUSCULAR; INTRAVENOUS at 18:40

## 2018-10-21 RX ADMIN — Medication 1 TABLET(S): at 18:35

## 2018-10-21 RX ADMIN — Medication 4: at 12:11

## 2018-10-21 RX ADMIN — Medication 250 MILLIGRAM(S): at 08:15

## 2018-10-21 RX ADMIN — CEFEPIME 100 MILLIGRAM(S): 1 INJECTION, POWDER, FOR SOLUTION INTRAMUSCULAR; INTRAVENOUS at 05:31

## 2018-10-21 RX ADMIN — Medication 650 MILLIGRAM(S): at 04:34

## 2018-10-21 RX ADMIN — SACUBITRIL AND VALSARTAN 1 TABLET(S): 24; 26 TABLET, FILM COATED ORAL at 05:32

## 2018-10-21 RX ADMIN — CARVEDILOL PHOSPHATE 12.5 MILLIGRAM(S): 80 CAPSULE, EXTENDED RELEASE ORAL at 05:30

## 2018-10-21 RX ADMIN — Medication 400 MILLIGRAM(S): at 01:14

## 2018-10-21 RX ADMIN — Medication 3 MILLILITER(S): at 03:09

## 2018-10-21 RX ADMIN — Medication 1 TABLET(S): at 05:30

## 2018-10-21 RX ADMIN — CARVEDILOL PHOSPHATE 12.5 MILLIGRAM(S): 80 CAPSULE, EXTENDED RELEASE ORAL at 18:35

## 2018-10-21 RX ADMIN — IRON SUCROSE 210 MILLIGRAM(S): 20 INJECTION, SOLUTION INTRAVENOUS at 12:14

## 2018-10-21 RX ADMIN — Medication 650 MILLIGRAM(S): at 05:27

## 2018-10-21 RX ADMIN — CHLORHEXIDINE GLUCONATE 1 APPLICATION(S): 213 SOLUTION TOPICAL at 05:30

## 2018-10-21 RX ADMIN — Medication 1 TABLET(S): at 22:53

## 2018-10-21 RX ADMIN — HEPARIN SODIUM 5000 UNIT(S): 5000 INJECTION INTRAVENOUS; SUBCUTANEOUS at 05:30

## 2018-10-21 RX ADMIN — INSULIN GLARGINE 10 UNIT(S): 100 INJECTION, SOLUTION SUBCUTANEOUS at 22:53

## 2018-10-21 RX ADMIN — PANTOPRAZOLE SODIUM 40 MILLIGRAM(S): 20 TABLET, DELAYED RELEASE ORAL at 08:18

## 2018-10-21 RX ADMIN — CALCITRIOL 0.25 MICROGRAM(S): 0.5 CAPSULE ORAL at 18:37

## 2018-10-21 RX ADMIN — Medication 81 MILLIGRAM(S): at 18:36

## 2018-10-21 RX ADMIN — Medication 4: at 18:37

## 2018-10-21 RX ADMIN — HEPARIN SODIUM 5000 UNIT(S): 5000 INJECTION INTRAVENOUS; SUBCUTANEOUS at 18:38

## 2018-10-21 NOTE — CHART NOTE - NSCHARTNOTEFT_GEN_A_CORE
Called by RN for rectal temp of 101.7F. Pt states that she feels warm but denies chills, chest pain, shortness of breath, wheezing, difficulty breathing or any other symptoms. Pt states that she has had a productive cough with yellow color sputum since admission. Pt has been afebrile until now.     VITAL SIGNS:  T(C): 37.9 (10-21-18 @ 03:05), Max: 38.7 (10-21-18 @ 00:35)  HR: 72 (10-21-18 @ 03:56) (65 - 88)  BP: 133/68 (10-21-18 @ 03:56) (111/65 - 133/68)  RR: 19 (10-21-18 @ 03:56) (18 - 19)  SpO2: 100% (10-21-18 @ 03:56) (90% - 100%)  Wt(kg): --Vital Signs Last 24 Hrs  T(C): 37.9 (21 Oct 2018 03:05), Max: 38.7 (21 Oct 2018 00:35)  T(F): 100.3 (21 Oct 2018 03:05), Max: 101.7 (21 Oct 2018 00:35)  HR: 72 (21 Oct 2018 03:56) (65 - 88)  BP: 133/68 (21 Oct 2018 03:56) (111/65 - 133/68)  BP(mean): --  RR: 19 (21 Oct 2018 03:56) (18 - 19)  SpO2: 100% (21 Oct 2018 03:56) (90% - 100%)    PHYSICAL EXAM:  GENERAL: NAD, well-groomed, well-developed, sleeping in bed comfortably  HEAD:  Atraumatic, Normocephalic  EYES: EOMI, PERRLA, conjunctiva and sclera clear  ENT: Moist mucous membranes  NECK: Supple, No JVD  CHEST/LUNG: Diminished breath sounds in RLL, crackles in LLL. No rales, rhonchi, wheezing, or rubs  HEART: Regular rate and rhythm; No murmurs, rubs, or gallops  ABDOMEN: Bowel sounds present; Soft, Nontender, Nondistended  EXTREMITIES:  2+ Peripheral Pulses, No clubbing, cyanosis, or edema  NERVOUS SYSTEM:  Alert & Oriented X3, no focal deficits   LYMPH: No lymphadenopathy noted  SKIN: No rashes or lesions    ASSESSMENT/ PLAN: Pt is a 58F w/ PMHx of ESRD (on HD MWF), CAD, CHF-systolic, IDDM2, HTN, HLD, paroxysmal Afib, h/o GI bleed and PNA w/ parapneumonic effusion s/p chest tube placement who presented to the ED w/ SOB and productive cough some yellow phlegm, admitted 10/16/18 for pulmonary edema with Right pleural effusion. Antibiotics were held because Pt had no fever and no chills.  Pt tonight had rectal temp 101.7F. Sepsis workup initiated and antibiotics to be started. As per Dr. Montgomery, ID is to see Pt today. Pt is comfortable and hemodynamically stable.  -STAT CXR- improved pulmonary congestion from previous CXR 10/19/18  -STAT CBC shows WBC 6000 with left shift, Procalcitonin 0.7, Lactate 1.4  -f/u blood cultures x2 pending  -f/u UA  -Ofirmev 1g x1 given at 01:15. Repeat temp 98.7F  -Continue to monitor. Call PA for worsening symptoms and will reassess prn

## 2018-10-21 NOTE — CONSULT NOTE ADULT - SUBJECTIVE AND OBJECTIVE BOX
[facility]  INFECTIOUS DISEASES AND INTERNAL MEDICINE at [place]  =======================================================  Jose Carlos Servin MD  Diplomates American Board of Internal Medicine and Infectious Diseases  =======================================================    [id number]  JAROCHO MORALES   This is a 58y  Female  with  ESRD (on HD MWF), CAD, CHF-systolic, IDDM2, HTN, HLD, paroxysmal Afib, h/o GI bleed and PNA w/ parapneumonic effusion s/p chest tube placement in September, who presents to hospital w/ SOB and productive cough some yellow phlegm, streak of blood ?  no fever , no chills.  SOB is worse on exertion. Pt was seen by nurse at home on day of admission, found with reduced breath sounds on right and was advised to come ER.    Overnight rapid response noted.  Pt with productive whitish cough with mild SOB. Desaturations yesterday, 98% on 3L NC today. NO hx of TB or hemoptysis. PT is from [facility] and last visit was 3 years ago. No diarrhea. c/o dysuria, yet makes some urine.   She was found with fevers 101.7. patient was started on Cefepime by the medical team.  She was also given a dose of vancomycin.  interval CXR on 10/21/18 showed  No significant change pulmonary vascular congestion, moderate to large  right pleural effusion and underlying atelectasis and/or infiltrates not  excluded.    patient denied sick contacts at home. no other recent illness.     =======================================================  Past Medical & Surgical Hx:  =====================  PAST MEDICAL & SURGICAL HISTORY:  Coronary artery disease, angina presence unspecified, unspecified vessel or lesion type, unspecified whether native or transplanted heart  Paroxysmal atrial fibrillation  Anemia due to chronic kidney disease, unspecified CKD stage  Chronic systolic congestive heart failure  Pleural effusion  Pericardial effusion  End stage renal disease on dialysis  HLD (hyperlipidemia)  HTN (hypertension)  DM (diabetes mellitus)  A-V fistula  Encounter for dialysis catheter care      Problem List:  ==========  HEALTH ISSUES - PROBLEM Dx:  Anemia, unspecified type: Anemia, unspecified type  Prophylactic measure: Prophylactic measure  Paroxysmal atrial fibrillation: Paroxysmal atrial fibrillation  Type 2 diabetes mellitus with complication, with long-term current use of insulin: Type 2 diabetes mellitus with complication, with long-term current use of insulin  Coronary artery disease, angina presence unspecified, unspecified vessel or lesion type, unspecified whether native or transplanted heart: Coronary artery disease, angina presence unspecified, unspecified vessel or lesion type, unspecified whether native or transplanted heart  End stage renal disease on dialysis: End stage renal disease on dialysis  Dyspnea on exertion: Dyspnea on exertion    Social Hx:  =======  no toxic habits currently    FAMILY HISTORY:  Family history of kidney disease in brother (Sibling)  no significant family history of immunosuppressive disorders in mother or father    Allergies    No Known Allergies    Intolerances        Antibiotics:  cefepime   IVPB 1000 milliGRAM(s) IV Intermittent every 24 hours    Other medications:  ALBUTerol    90 MICROgram(s) HFA Inhaler 1 Puff(s) Inhalation every 4 hours  aspirin enteric coated 81 milliGRAM(s) Oral daily  calcitriol   Capsule 0.25 MICROGram(s) Oral daily  calcium carbonate   1250 mG (OsCal) 1 Tablet(s) Oral three times a day  carvedilol 12.5 milliGRAM(s) Oral every 12 hours  chlorhexidine 2% Cloths 1 Application(s) Topical <User Schedule>  dextrose 5%. 1000 milliLiter(s) IV Continuous <Continuous>  dextrose 50% Injectable 12.5 Gram(s) IV Push once  dextrose 50% Injectable 25 Gram(s) IV Push once  dextrose 50% Injectable 25 Gram(s) IV Push once  epoetin gian Injectable 64489 Unit(s) IV Push <User Schedule>  heparin  Injectable 5000 Unit(s) SubCutaneous every 12 hours  influenza   Vaccine 0.5 milliLiter(s) IntraMuscular once  insulin glargine Injectable (LANTUS) 10 Unit(s) SubCutaneous at bedtime  insulin lispro (HumaLOG) corrective regimen sliding scale   SubCutaneous three times a day before meals  insulin lispro (HumaLOG) corrective regimen sliding scale   SubCutaneous at bedtime  iron sucrose IVPB 100 milliGRAM(s) IV Intermittent every 24 hours  pantoprazole    Tablet 40 milliGRAM(s) Oral before breakfast  sacubitril 97 mG/valsartan 103 mG 1 Tablet(s) Oral two times a day  tiotropium 18 MICROgram(s) Capsule 1 Capsule(s) Inhalation daily       cefepime   IVPB   100 mL/Hr IV Intermittent (10-21-18 @ 05:31)       vancomycin  IVPB   250 mL/Hr IV Intermittent (10-21-18 @ 08:15)          =======================================================  REVIEW OF SYSTEMS:  as above  all other ROS negative    ======================================================  Physical Exam:  ============  T(F): 97.9 (21 Oct 2018 15:38), Max: 101.7 (21 Oct 2018 00:35)  HR: 70 (21 Oct 2018 15:38)  BP: 145/74 (21 Oct 2018 15:38)  RR: 18 (21 Oct 2018 15:38)  SpO2: 100% (21 Oct 2018 15:38) (90% - 100%)      General:  No acute distress.  THIN FRIAL  Eye: Pupils are equal, round and reactive to light, Extraocular movements are intact, Normal conjunctiva.  HENT: Normocephalic, Oral mucosa is moist, No pharyngeal erythema, No sinus tenderness.  Neck: Supple, No lymphadenopathy.  Respiratory: Lungs are clear to auscultation, Respirations are non-labored.  Cardiovascular: Normal rate, Regular rhythm, No murmur, Good pulses equal in all extremities, No edema.  Gastrointestinal: Soft, Non-tender, Non-distended, Normal bowel sounds.  Genitourinary: No costovertebral angle tenderness.  Lymphatics: No lymphadenopathy neck,   Musculoskeletal: Normal range of motion, Normal strength.  Integumentary: No rash.  Neurologic: Alert, Oriented, No focal deficits, Cranial Nerves II-XII are grossly intact.  Psychiatric: Appropriate mood & affect.      =======================================================  Labs:  ====  Labs:                        7.8    5.6   )-----------( 353      ( 21 Oct 2018 07:14 )             27.1     10-21    133<L>  |  94<L>  |  32.0<H>  ----------------------------<  134<H>  4.4   |  28.0  |  3.62<H>    Ca    8.8      21 Oct 2018 07:14  Phos  3.2     10-21  Mg     1.9     10-21    TPro  6.9  /  Alb  2.9<L>  /  TBili  0.7  /  DBili  x   /  AST  19  /  ALT  8   /  AlkPhos  130<H>  10-21        Culture - Blood (collected 10-16-18 @ 15:53)  Source: .Blood Blood    Culture - Blood (collected 10-16-18 @ 15:53)  Source: .Blood Blood        < from: Xray Chest 1 View- PORTABLE-Routine (10.21.18 @ 09:40) >     EXAM:  XR CHEST PORTABLE ROUTINE 1V                          PROCEDURE DATE:  10/21/2018          INTERPRETATION:  History: Follow-up infiltrates.    Frontal chest.    Comparison: Earlier study performed same day at 1:24 AM and prior study   10/19/2018.    Pulmonary vascular congestion. No significant interval change moderate to   large right pleural effusion. Associated atelectasis and/or   infiltrate/lung consolidation not excluded. Unchanged patchy and hazy   opacities left mid/lower lung; effusion with overlying atelectasis and/or   infiltrate. Difficult to adequately assess heart size.    Impression:    No significant change pulmonary vascular congestion, moderate to large   right pleural effusion and underlying atelectasis and/or infiltrates not   excluded.                MIN KEITH M.D. ATTENDING RADIOLOGIST  This document has been electronically signed. Oct 21 2018 11:30AM                  < end of copied text > [facility]  INFECTIOUS DISEASES AND INTERNAL MEDICINE at [place]  =======================================================  Jose Carlos Servin MD  Diplomates American Board of Internal Medicine and Infectious Diseases  =======================================================    [id number]  JAROCHO MORALES   This is a 58y  Female  with  ESRD (on HD MWF), CAD, CHF-systolic, IDDM2, HTN, HLD, paroxysmal Afib, h/o GI bleed and PNA w/ parapneumonic effusion s/p chest tube placement in September, who presents to hospital w/ SOB and productive cough some yellow phlegm, streak of blood ?  no fever , no chills.  SOB is worse on exertion. Pt was seen by nurse at home on day of admission, found with reduced breath sounds on right and was advised to come ER.    Overnight rapid response noted.  Pt with productive whitish cough with mild SOB. Desaturations yesterday, 98% on 3L NC today. NO hx of TB or hemoptysis. PT is from [facility] and last visit was 3 years ago. No diarrhea. c/o dysuria, yet makes some urine.   She was found with fevers 101.7. patient was started on Cefepime by the medical team.  She was also given a dose of vancomycin.  interval CXR on 10/21/18 showed  No significant change pulmonary vascular congestion, moderate to large  right pleural effusion and underlying atelectasis and/or infiltrates not  excluded.    patient denied sick contacts at home. no other recent illness.     =======================================================  Past Medical & Surgical Hx:  =====================  PAST MEDICAL & SURGICAL HISTORY:  Coronary artery disease, angina presence unspecified, unspecified vessel or lesion type, unspecified whether native or transplanted heart  Paroxysmal atrial fibrillation  Anemia due to chronic kidney disease, unspecified CKD stage  Chronic systolic congestive heart failure  Pleural effusion  Pericardial effusion  End stage renal disease on dialysis  HLD (hyperlipidemia)  HTN (hypertension)  DM (diabetes mellitus)  A-V fistula  Encounter for dialysis catheter care      Problem List:  ==========  HEALTH ISSUES - PROBLEM Dx:  Anemia, unspecified type: Anemia, unspecified type  Prophylactic measure: Prophylactic measure  Paroxysmal atrial fibrillation: Paroxysmal atrial fibrillation  Type 2 diabetes mellitus with complication, with long-term current use of insulin: Type 2 diabetes mellitus with complication, with long-term current use of insulin  Coronary artery disease, angina presence unspecified, unspecified vessel or lesion type, unspecified whether native or transplanted heart: Coronary artery disease, angina presence unspecified, unspecified vessel or lesion type, unspecified whether native or transplanted heart  End stage renal disease on dialysis: End stage renal disease on dialysis  Dyspnea on exertion: Dyspnea on exertion    Social Hx:  =======  no toxic habits currently    FAMILY HISTORY:  Family history of kidney disease in brother (Sibling)  no significant family history of immunosuppressive disorders in mother or father    Allergies    No Known Allergies    Intolerances        Antibiotics:  cefepime   IVPB 1000 milliGRAM(s) IV Intermittent every 24 hours    Other medications:  ALBUTerol    90 MICROgram(s) HFA Inhaler 1 Puff(s) Inhalation every 4 hours  aspirin enteric coated 81 milliGRAM(s) Oral daily  calcitriol   Capsule 0.25 MICROGram(s) Oral daily  calcium carbonate   1250 mG (OsCal) 1 Tablet(s) Oral three times a day  carvedilol 12.5 milliGRAM(s) Oral every 12 hours  chlorhexidine 2% Cloths 1 Application(s) Topical <User Schedule>  dextrose 5%. 1000 milliLiter(s) IV Continuous <Continuous>  dextrose 50% Injectable 12.5 Gram(s) IV Push once  dextrose 50% Injectable 25 Gram(s) IV Push once  dextrose 50% Injectable 25 Gram(s) IV Push once  epoetin gian Injectable 05636 Unit(s) IV Push <User Schedule>  heparin  Injectable 5000 Unit(s) SubCutaneous every 12 hours  influenza   Vaccine 0.5 milliLiter(s) IntraMuscular once  insulin glargine Injectable (LANTUS) 10 Unit(s) SubCutaneous at bedtime  insulin lispro (HumaLOG) corrective regimen sliding scale   SubCutaneous three times a day before meals  insulin lispro (HumaLOG) corrective regimen sliding scale   SubCutaneous at bedtime  iron sucrose IVPB 100 milliGRAM(s) IV Intermittent every 24 hours  pantoprazole    Tablet 40 milliGRAM(s) Oral before breakfast  sacubitril 97 mG/valsartan 103 mG 1 Tablet(s) Oral two times a day  tiotropium 18 MICROgram(s) Capsule 1 Capsule(s) Inhalation daily       cefepime   IVPB   100 mL/Hr IV Intermittent (10-21-18 @ 05:31)       vancomycin  IVPB   250 mL/Hr IV Intermittent (10-21-18 @ 08:15)          =======================================================  REVIEW OF SYSTEMS:  as above  all other ROS negative    ======================================================  Physical Exam:  ============  T(F): 97.9 (21 Oct 2018 15:38), Max: 101.7 (21 Oct 2018 00:35)  HR: 70 (21 Oct 2018 15:38)  BP: 145/74 (21 Oct 2018 15:38)  RR: 18 (21 Oct 2018 15:38)  SpO2: 100% (21 Oct 2018 15:38) (90% - 100%)      General:  No acute distress.  THIN FRIAL  Eye: Pupils are equal, round and reactive to light, Extraocular movements are intact, Normal conjunctiva.  HENT: Normocephalic, Oral mucosa is moist, No pharyngeal erythema, No sinus tenderness.  Neck: Supple, No lymphadenopathy.  Respiratory: Lungs are clear to auscultation, Respirations are non-labored.  Cardiovascular: Normal rate, Regular rhythm, No murmur, Good pulses equal in all extremities, No edema.  Gastrointestinal: Soft, Non-tender, Non-distended, Normal bowel sounds.  Genitourinary: No costovertebral angle tenderness.  Lymphatics: No lymphadenopathy neck,   Musculoskeletal: Normal range of motion, Normal strength.  Integumentary: No rash.  Neurologic: Alert, Oriented, No focal deficits, Cranial Nerves II-XII are grossly intact.  Psychiatric: Appropriate mood & affect.      =======================================================  Labs:  ====  Labs:                        7.8    5.6   )-----------( 353      ( 21 Oct 2018 07:14 )             27.1     10-21    133<L>  |  94<L>  |  32.0<H>  ----------------------------<  134<H>  4.4   |  28.0  |  3.62<H>    Ca    8.8      21 Oct 2018 07:14  Phos  3.2     10-21  Mg     1.9     10-21    TPro  6.9  /  Alb  2.9<L>  /  TBili  0.7  /  DBili  x   /  AST  19  /  ALT  8   /  AlkPhos  130<H>  10-21        Culture - Blood (collected 10-16-18 @ 15:53)  Source: .Blood Blood    Culture - Blood (collected 10-16-18 @ 15:53)  Source: .Blood Blood        < from: Xray Chest 1 View- PORTABLE-Routine (10.21.18 @ 09:40) >     EXAM:  XR CHEST PORTABLE ROUTINE 1V                          PROCEDURE DATE:  10/21/2018          INTERPRETATION:  History: Follow-up infiltrates.    Frontal chest.    Comparison: Earlier study performed same day at 1:24 AM and prior study   10/19/2018.    Pulmonary vascular congestion. No significant interval change moderate to   large right pleural effusion. Associated atelectasis and/or   infiltrate/lung consolidation not excluded. Unchanged patchy and hazy   opacities left mid/lower lung; effusion with overlying atelectasis and/or   infiltrate. Difficult to adequately assess heart size.    Impression:    No significant change pulmonary vascular congestion, moderate to large   right pleural effusion and underlying atelectasis and/or infiltrates not   excluded.             MIN KEITH M.D. ATTENDING RADIOLOGIST  This document has been electronically signed. Oct 21 2018 11:30AM                 < end of copied text >

## 2018-10-21 NOTE — PROGRESS NOTE ADULT - ASSESSMENT
58F with PMH of DVT, s/p IVC filter, NICM (Cath 3/2018),  HFrEF with worsened EF Echo 8/31/2018: EF 20-25%, moderate diastolic dysfunction, moderate MR, moderate TR, pericardial effusion s/p pericardiocentesis, pericarditis,  pleural effusion s/p thoracentesis, paroxysmal atrial fibrillation not on anticoagulation due to major GIB on AC, ESRD on HD, anemia of chronic disease presented with dyspnea and cough productive of phlegm with evidence of blood. Chest Xray noted an unchanged moderate right-sided pleural effusion, improved bilateral lower lobe infiltrates. CT of the chest noted extensive patchy bilateral centrilobular groundglass airspace disease and mild scattered interlobular septal thickening, complete passive atelectasis of the right lower lobe, resolution of right upper and middle lobe consolidated infiltrates, interval development of mild right middle lobe and left lingular parenchymal scarring with associated mild-moderate traction bronchiectasis, moderate right and small left pleural effusions, moderate-severe cardiomegaly, interval development of diffuse moderate subcutaneous edema. The patient was seen by Cardiology in consultation for heart failure. The patient was seen by Hematology in consultation for anemia and packed red blood cells were transfused.       ASSESSMENT / PLAN:  ----------------------------------------  Anemia - Improved with transfusion of packed red blood cells. On iron sucrose and epoetin gian. Hematology follow up noted.    ESRD - Hemodialysis as scheduled.    Heart failure - On carvedilol and sacubitril/valsartan.     Abnormal CXR- Mild persistent wheezing despite adequate HD. Repeat Chest Xray with Increased diffuse airspace disease and increased right pleural effusion. Pulmonary edema is suspected although cannot exclude pneumonia. Pt non productive cough & desaturations on . NO fevers recoorded.   CT chest: Moderate-severe CHF/fluid overload with marked cardiomegaly, interstitial/pulmonary alveolar edema, moderate right and small left pleural effusions, and anasarca.Complete passive atelectasis of the right lower lobe. Minimal left basilar passive atelectasis.Interval development of chronic scarring of the right middle lobe and left lingula with associated traction bronchiectasis suggesting sequela of mycobacterium avium complex infection. Small amount of of upper abdominal ascites.  Temp of 101 overnight  Cefepime started, Vanco x 1 dose given  Pt with productive whitish cough with mild SOB. Desaturations yesterday, 98% on 3L NC today. NO hx of TB or hemoptysis. PT is from [facility] and last visit was 3 years ago. No diarrhea. c/o dysuria, yet makes some urine.   F/U Blood cx, RVP, sputum cx, UA, Ucx  ID consult called      Diabetes - Insulin coverage, close monitoring of blood glucose levels.    Atrial fibrillation - On aspirin. Not on AC due to GIB    Hypertension - Close blood pressure monitoring. 58F with PMH of DVT, s/p IVC filter, NICM (Cath 3/2018),  HFrEF with worsened EF Echo 8/31/2018: EF 20-25%, moderate diastolic dysfunction, moderate MR, moderate TR, pericardial effusion s/p pericardiocentesis, pericarditis,  pleural effusion s/p thoracentesis, paroxysmal atrial fibrillation not on anticoagulation due to major GIB on AC, ESRD on HD, anemia of chronic disease presented with dyspnea and cough productive of phlegm with evidence of blood. Chest Xray noted an unchanged moderate right-sided pleural effusion, improved bilateral lower lobe infiltrates. CT of the chest noted extensive patchy bilateral centrilobular groundglass airspace disease and mild scattered interlobular septal thickening, complete passive atelectasis of the right lower lobe, resolution of right upper and middle lobe consolidated infiltrates, interval development of mild right middle lobe and left lingular parenchymal scarring with associated mild-moderate traction bronchiectasis, moderate right and small left pleural effusions, moderate-severe cardiomegaly, interval development of diffuse moderate subcutaneous edema. The patient was seen by Cardiology in consultation for heart failure. The patient was seen by Hematology in consultation for anemia and packed red blood cells were transfused.       ASSESSMENT / PLAN:  ----------------------------------------    HCAP- Mild persistent wheezing despite adequate HD. Repeat Chest Xray with Increased diffuse airspace disease and increased right pleural effusion. Pulmonary edema is suspected although cannot exclude pneumonia. Pt non productive cough & desaturations on . NO fevers recoorded.   CT chest: Moderate-severe CHF/fluid overload with marked cardiomegaly, interstitial/pulmonary alveolar edema, moderate right and small left pleural effusions, and anasarca.Complete passive atelectasis of the right lower lobe. Minimal left basilar passive atelectasis.Interval development of chronic scarring of the right middle lobe and left lingula with associated traction bronchiectasis suggesting sequela of mycobacterium avium complex infection. Small amount of of upper abdominal ascites.  Temp of 101 overnight  Cefepime started, Vanco x 1 dose given  Pt with productive whitish cough with mild SOB. Desaturations yesterday, 98% on 3L NC today. NO hx of TB or hemoptysis. PT is from [facility] and last visit was 3 years ago. No diarrhea. c/o dysuria, yet makes some urine.   F/U Blood cx, RVP, sputum cx, UA, Ucx  ID consult called    Anemia - Improved with transfusion of packed red blood cells. On iron sucrose and epoetin gian. Hematology follow up noted.    ESRD - Hemodialysis as scheduled.    Heart failure - On carvedilol and sacubitril/valsartan.     Diabetes - Insulin coverage, close monitoring of blood glucose levels.    Atrial fibrillation - On aspirin. Not on AC due to GIB    Hypertension - Close blood pressure monitoring.

## 2018-10-21 NOTE — CONSULT NOTE ADULT - ASSESSMENT
this 58 y.o. F ESRD on HD, systolic CHF, CAD, DM2, HTN and HLD, here for SOB found with right sided effusions, pulmonary congestion, developed fevers.     possible underlying infiltrate in right side,   from community, but has multiple recent admission.   reasonable to cover for HAP.       Agree with cefepime 1 gram Q24H for this HD patient  - check sputum cx  - check urine legionella    - follow up all outstanding cultures  - trend temperature and WBC curve  - repeat cultures from blood and all sources if febrile.     consider thoracentesis  if no clinical improvement on Cefepime.

## 2018-10-21 NOTE — PROGRESS NOTE ADULT - SUBJECTIVE AND OBJECTIVE BOX
JAROCHO MORALES  58y  Female  [id number]  FU anemia of CKD.    Patient is a 58y old  Female who presents with a chief complaint of SOB (17 Oct 2018 01:12)    HPI:  Pt is a 59 Y/O F w/ PMHx of ESRD (on HD MWF), CAD, CHF-systolic, IDDM2, HTN, HLD, paroxysmal Afib, h/o GI bleed and PNA w/ parapneumonic effusion s/p chest tube placement who presents to the ED w/ SOB and productive cough some yellow phlegm, streak of blood ?  no fever , no chills.  SOB is worse on exertion. Pt was seen by nurse at home today, on examination pt was noted to reduced breath sounds on right and was advised to come in for PNA r/o. Pt reports mild headache and denies any abd. pain. no n/v/d. no dark stools. no blood per rectum. . No other complaints. Pt was recently admitted at [facility] in 9/18 for SOB. pt. is not on long term AC for PAF for h/o gi bleed. (16 Oct 2018 21:07)  Asked to see for severe anemia initially in the ED.  Denies bleeding  Symptoms improved post blood transfusion.  Hb remains stable, currently 7.8  Offers no new complaints-denies chest pain or SOB    PAST MEDICAL & SURGICAL HISTORY:  Coronary artery disease, angina presence unspecified, unspecified vessel or lesion type, unspecified whether native or transplanted heart  Paroxysmal atrial fibrillation  Anemia due to chronic kidney disease, unspecified CKD stage  Chronic systolic congestive heart failure  Pleural effusion  Pericardial effusion  End stage renal disease on dialysis  HLD (hyperlipidemia)  HTN (hypertension)  DM (diabetes mellitus)  A-V fistula  Encounter for dialysis catheter care    FAMILY HISTORY:  Family history of kidney disease in brother (Sibling)    REVIEW OF SYSTEMS  Generalized   weakness  Nausea  No overt bleeding  No pain  Cardiac. Neg  Respiratory.  Neg.  GI Neg.	  All other ROS is neg.      PHYSICAL EXAM:  Alert and oriented  Positive pallor  No jaundice  NO LNs  Lungs: Clear  Heart: RR  Abd: Question of splenomegaly or a left flank mass.  LEs: No CCE or DVT clinically.    CBC 10/18/18:  WBC 7.8, H/H 8.4/27.9, plt ct 409,000.  Ferritin 627, Fe 28, % sat 14, Cr 3.27; polyclonal gammopathy  CBC Full  -  ( 17 Oct 2018 06:10 )  WBC Count : 9.4 K/uL  Hemoglobin : 6.7 g/dL  Hematocrit : 23.5 %  Platelet Count - Automated : 426 K/uL  Mean Cell Volume : 87.0 fl  Mean Cell Hemoglobin : 24.8 pg  Mean Cell Hemoglobin Concentration : 28.5 g/dL  Auto Neutrophil # : 7.5 K/uL  Auto Lymphocyte # : 1.2 K/uL  Auto Monocyte # : 0.4 K/uL  Auto Eosinophil # : 0.2 K/uL  Auto Basophil # : 0.0 K/uL  Auto Neutrophil % : 79.4 %  Auto Lymphocyte % : 12.4 %  Auto Monocyte % : 4.8 %  Auto Eosinophil % : 1.8 %  Auto Basophil % : 0.3 %    PT/INR - ( 17 Oct 2018 06:10 )   PT: 12.8 sec;   INR: 1.16 ratio         PTT - ( 17 Oct 2018 06:10 )  PTT:26.6 sec  17 Oct 2018 06:10    138    |  96     |  40.0   ----------------------------<  178    3.8     |  27.0   |  4.36     Ca    8.8        17 Oct 2018 06:10  Phos  2.2       17 Oct 2018 06:10  Mg     2.0       17 Oct 2018 06:10    TPro  7.3    /  Alb  3.1    /  TBili  0.8    /  DBili  x      /  AST  12     /  ALT  10     /  AlkPhos  114    17 Oct 2018 06:10    CBC Full  -  ( 19 Oct 2018 07:38 )  WBC Count : 7.8 K/uL  Hemoglobin : 9.0 g/dL  Hematocrit : 28.9 %  Platelet Count - Automated : 421 K/uL  Mean Cell Volume : 88.1 fl  Mean Cell Hemoglobin : 27.4 pg  Mean Cell Hemoglobin Concentration : 31.1 g/dL  Auto Neutrophil # : x  Auto Lymphocyte # : x  Auto Monocyte # : x  Auto Eosinophil # : x  Auto Basophil # : x  Auto Neutrophil % : x  Auto Lymphocyte % : x  Auto Monocyte % : x  Auto Eosinophil % : x  Auto Basophil % : x    19 Oct 2018 07:38    134    |  93     |  41.0   ----------------------------<  122    4.0     |  26.0   |  4.26     Ca    8.9        19 Oct 2018 07:38  Phos  2.3       18 Oct 2018 07:17    Anemia of CKD  Better post transfusion. Stable at 7.8  Suggest Procrit/IV iron as deems appropriate by renal.

## 2018-10-21 NOTE — PROGRESS NOTE ADULT - SUBJECTIVE AND OBJECTIVE BOX
CHIEF COMPLAINT/INTERVAL HISTORY:    Patient is a 58y old  Female who presents with a chief complaint of SOB (20 Oct 2018 19:29)      HPI:  Pt is a 57 Y/O F w/ PMHx of ESRD (on HD MWF), CAD, CHF-systolic, IDDM2, HTN, HLD, paroxysmal Afib, h/o GI bleed and PNA w/ parapneumonic effusion s/p chest tube placement who presents to the ED w/ SOB and productive cough some yellow phlegm, streak of blood ?  no fever , no chills.  SOB is worse on exertion. Pt was seen by nurse at home today, on examination pt was noted to reduced breath sounds on right and was advised to come in for PNA r/o. Pt reports mild headache and denies any abd. pain. no n/v/d. no dark stools. no blood per rectum. . No other complaints. Pt was recently admitted at [facility] in 9/18 for SOB. pt. is not on long term AC for PAF for h/o gi bleed. (16 Oct 2018 21:07)      SUBJECTIVE & OBJECTIVE/ ROS: Pt seen and examined at bedside. Translation by RN. Pt with productive whitish cough with mild SOB. Desaturations yesterday, 98% on 3L NC today. NO hx of TB or hemoptysis. PT is from [facility] and last visit was 3 years ago. No diarrhea. c/o dysuria, yet makes some urine. No chest pain, palpitations, light headedness/dizziness, fevers/chills, abdominal pain, n/v, diarrhea/constipation, dysuria or increased urinary frequency.     ICU Vital Signs Last 24 Hrs  T(C): 36.4 (21 Oct 2018 07:31), Max: 38.7 (21 Oct 2018 00:35)  T(F): 97.6 (21 Oct 2018 07:31), Max: 101.7 (21 Oct 2018 00:35)  HR: 65 (21 Oct 2018 07:31) (65 - 88)  BP: 137/76 (21 Oct 2018 07:31) (121/66 - 137/76)  BP(mean): --  ABP: --  ABP(mean): --  RR: 18 (21 Oct 2018 07:31) (18 - 19)  SpO2: 98% (21 Oct 2018 07:31) (90% - 100%)        MEDICATIONS  (STANDING):  ALBUTerol    90 MICROgram(s) HFA Inhaler 1 Puff(s) Inhalation every 4 hours  aspirin enteric coated 81 milliGRAM(s) Oral daily  calcitriol   Capsule 0.25 MICROGram(s) Oral daily  calcium carbonate   1250 mG (OsCal) 1 Tablet(s) Oral three times a day  carvedilol 12.5 milliGRAM(s) Oral every 12 hours  cefepime   IVPB      chlorhexidine 2% Cloths 1 Application(s) Topical <User Schedule>  dextrose 5%. 1000 milliLiter(s) (50 mL/Hr) IV Continuous <Continuous>  dextrose 50% Injectable 12.5 Gram(s) IV Push once  dextrose 50% Injectable 25 Gram(s) IV Push once  dextrose 50% Injectable 25 Gram(s) IV Push once  epoetin gian Injectable 38014 Unit(s) IV Push <User Schedule>  heparin  Injectable 5000 Unit(s) SubCutaneous every 12 hours  influenza   Vaccine 0.5 milliLiter(s) IntraMuscular once  insulin glargine Injectable (LANTUS) 10 Unit(s) SubCutaneous at bedtime  insulin lispro (HumaLOG) corrective regimen sliding scale   SubCutaneous three times a day before meals  insulin lispro (HumaLOG) corrective regimen sliding scale   SubCutaneous at bedtime  iron sucrose IVPB 100 milliGRAM(s) IV Intermittent every 24 hours  pantoprazole    Tablet 40 milliGRAM(s) Oral before breakfast  sacubitril 97 mG/valsartan 103 mG 1 Tablet(s) Oral two times a day  tiotropium 18 MICROgram(s) Capsule 1 Capsule(s) Inhalation daily    MEDICATIONS  (PRN):  ALBUTerol/ipratropium for Nebulization 3 milliLiter(s) Nebulizer every 6 hours PRN Shortness of Breath and/or Wheezing  dextrose 40% Gel 15 Gram(s) Oral once PRN Blood Glucose LESS THAN 70 milliGRAM(s)/deciliter  docusate sodium 100 milliGRAM(s) Oral two times a day PRN Constipation  glucagon  Injectable 1 milliGRAM(s) IntraMuscular once PRN Glucose LESS THAN 70 milligrams/deciliter  senna 2 Tablet(s) Oral at bedtime PRN Constipation      LABS:                        7.8    5.6   )-----------( 353      ( 21 Oct 2018 07:14 )             27.1     10-21    133<L>  |  94<L>  |  32.0<H>  ----------------------------<  134<H>  4.4   |  28.0  |  3.62<H>    Ca    8.8      21 Oct 2018 07:14  Phos  3.2     10-21  Mg     1.9     10-21    TPro  6.9  /  Alb  2.9<L>  /  TBili  0.7  /  DBili  x   /  AST  19  /  ALT  8   /  AlkPhos  130<H>  10-21          CAPILLARY BLOOD GLUCOSE      POCT Blood Glucose.: 217 mg/dL (21 Oct 2018 12:10)  POCT Blood Glucose.: 127 mg/dL (21 Oct 2018 08:16)  POCT Blood Glucose.: 134 mg/dL (20 Oct 2018 21:48)  POCT Blood Glucose.: 341 mg/dL (20 Oct 2018 18:15)      RECENT CULTURES:      RADIOLOGY & ADDITIONAL TESTS:      PHYSICAL EXAM:    General appearance: No acute distress, Awake, Alert  HEENT: Normocephalic, Atraumatic, Conjunctiva clear, EOMI  Neck: Supple, No JVD, No tenderness  Lungs: b/l rhonchi  Cardiovascular: S1S2, Regular rhythm  Abdomen: Soft, Nontender, Nondistended, No guarding/rebound, Positive bowel sounds  Extremities: No clubbing, No cyanosis, No edema, No calf tenderness, Left upper extremity AV fistula  Neuro: Strength equal bilaterally, No tremors  Psychiatric: Appropriate mood, Normal affect CHIEF COMPLAINT/INTERVAL HISTORY:    Patient is a 58y old  Female who presents with a chief complaint of SOB (20 Oct 2018 19:29)      HPI:  Pt is a 59 Y/O F w/ PMHx of ESRD (on HD MWF), CAD, CHF-systolic, IDDM2, HTN, HLD, paroxysmal Afib, h/o GI bleed and PNA w/ parapneumonic effusion s/p chest tube placement who presents to the ED w/ SOB and productive cough some yellow phlegm, streak of blood ?  no fever , no chills.  SOB is worse on exertion. Pt was seen by nurse at home today, on examination pt was noted to reduced breath sounds on right and was advised to come in for PNA r/o. Pt reports mild headache and denies any abd. pain. no n/v/d. no dark stools. no blood per rectum. . No other complaints. Pt was recently admitted at [facility] in 9/18 for SOB. pt. is not on long term AC for PAF for h/o gi bleed. (16 Oct 2018 21:07)      SUBJECTIVE & OBJECTIVE/ ROS: Pt seen and examined at bedside. Overnight rapid response noted. Translation by RN. Pt with productive whitish cough with mild SOB. Desaturations yesterday, 98% on 3L NC today. NO hx of TB or hemoptysis. PT is from [facility] and last visit was 3 years ago. No diarrhea. c/o dysuria, yet makes some urine. No chest pain, palpitations, light headedness/dizziness, fevers/chills, abdominal pain, n/v, diarrhea/constipation, dysuria or increased urinary frequency.     ICU Vital Signs Last 24 Hrs  T(C): 36.4 (21 Oct 2018 07:31), Max: 38.7 (21 Oct 2018 00:35)  T(F): 97.6 (21 Oct 2018 07:31), Max: 101.7 (21 Oct 2018 00:35)  HR: 65 (21 Oct 2018 07:31) (65 - 88)  BP: 137/76 (21 Oct 2018 07:31) (121/66 - 137/76)  BP(mean): --  ABP: --  ABP(mean): --  RR: 18 (21 Oct 2018 07:31) (18 - 19)  SpO2: 98% (21 Oct 2018 07:31) (90% - 100%)        MEDICATIONS  (STANDING):  ALBUTerol    90 MICROgram(s) HFA Inhaler 1 Puff(s) Inhalation every 4 hours  aspirin enteric coated 81 milliGRAM(s) Oral daily  calcitriol   Capsule 0.25 MICROGram(s) Oral daily  calcium carbonate   1250 mG (OsCal) 1 Tablet(s) Oral three times a day  carvedilol 12.5 milliGRAM(s) Oral every 12 hours  cefepime   IVPB      chlorhexidine 2% Cloths 1 Application(s) Topical <User Schedule>  dextrose 5%. 1000 milliLiter(s) (50 mL/Hr) IV Continuous <Continuous>  dextrose 50% Injectable 12.5 Gram(s) IV Push once  dextrose 50% Injectable 25 Gram(s) IV Push once  dextrose 50% Injectable 25 Gram(s) IV Push once  epoetin igan Injectable 66907 Unit(s) IV Push <User Schedule>  heparin  Injectable 5000 Unit(s) SubCutaneous every 12 hours  influenza   Vaccine 0.5 milliLiter(s) IntraMuscular once  insulin glargine Injectable (LANTUS) 10 Unit(s) SubCutaneous at bedtime  insulin lispro (HumaLOG) corrective regimen sliding scale   SubCutaneous three times a day before meals  insulin lispro (HumaLOG) corrective regimen sliding scale   SubCutaneous at bedtime  iron sucrose IVPB 100 milliGRAM(s) IV Intermittent every 24 hours  pantoprazole    Tablet 40 milliGRAM(s) Oral before breakfast  sacubitril 97 mG/valsartan 103 mG 1 Tablet(s) Oral two times a day  tiotropium 18 MICROgram(s) Capsule 1 Capsule(s) Inhalation daily    MEDICATIONS  (PRN):  ALBUTerol/ipratropium for Nebulization 3 milliLiter(s) Nebulizer every 6 hours PRN Shortness of Breath and/or Wheezing  dextrose 40% Gel 15 Gram(s) Oral once PRN Blood Glucose LESS THAN 70 milliGRAM(s)/deciliter  docusate sodium 100 milliGRAM(s) Oral two times a day PRN Constipation  glucagon  Injectable 1 milliGRAM(s) IntraMuscular once PRN Glucose LESS THAN 70 milligrams/deciliter  senna 2 Tablet(s) Oral at bedtime PRN Constipation      LABS:                        7.8    5.6   )-----------( 353      ( 21 Oct 2018 07:14 )             27.1     10-21    133<L>  |  94<L>  |  32.0<H>  ----------------------------<  134<H>  4.4   |  28.0  |  3.62<H>    Ca    8.8      21 Oct 2018 07:14  Phos  3.2     10-21  Mg     1.9     10-21    TPro  6.9  /  Alb  2.9<L>  /  TBili  0.7  /  DBili  x   /  AST  19  /  ALT  8   /  AlkPhos  130<H>  10-21          CAPILLARY BLOOD GLUCOSE      POCT Blood Glucose.: 217 mg/dL (21 Oct 2018 12:10)  POCT Blood Glucose.: 127 mg/dL (21 Oct 2018 08:16)  POCT Blood Glucose.: 134 mg/dL (20 Oct 2018 21:48)  POCT Blood Glucose.: 341 mg/dL (20 Oct 2018 18:15)      RECENT CULTURES:      RADIOLOGY & ADDITIONAL TESTS:      PHYSICAL EXAM:    General appearance: No acute distress, Awake, Alert  HEENT: Normocephalic, Atraumatic, Conjunctiva clear, EOMI  Neck: Supple, No JVD, No tenderness  Lungs: b/l rhonchi  Cardiovascular: S1S2, Regular rhythm  Abdomen: Soft, Nontender, Nondistended, No guarding/rebound, Positive bowel sounds  Extremities: No clubbing, No cyanosis, No edema, No calf tenderness, Left upper extremity AV fistula  Neuro: Strength equal bilaterally, No tremors  Psychiatric: Appropriate mood, Normal affect

## 2018-10-22 DIAGNOSIS — J18.9 PNEUMONIA, UNSPECIFIED ORGANISM: ICD-10-CM

## 2018-10-22 LAB
ANION GAP SERPL CALC-SCNC: 14 MMOL/L — SIGNIFICANT CHANGE UP (ref 5–17)
BASOPHILS # BLD AUTO: 0 K/UL — SIGNIFICANT CHANGE UP (ref 0–0.2)
BASOPHILS NFR BLD AUTO: 0.3 % — SIGNIFICANT CHANGE UP (ref 0–2)
BUN SERPL-MCNC: 49 MG/DL — HIGH (ref 8–20)
CALCIUM SERPL-MCNC: 9.2 MG/DL — SIGNIFICANT CHANGE UP (ref 8.6–10.2)
CHLORIDE SERPL-SCNC: 93 MMOL/L — LOW (ref 98–107)
CO2 SERPL-SCNC: 25 MMOL/L — SIGNIFICANT CHANGE UP (ref 22–29)
CREAT SERPL-MCNC: 5.11 MG/DL — HIGH (ref 0.5–1.3)
EOSINOPHIL # BLD AUTO: 0.2 K/UL — SIGNIFICANT CHANGE UP (ref 0–0.5)
EOSINOPHIL NFR BLD AUTO: 2.8 % — SIGNIFICANT CHANGE UP (ref 0–6)
GLUCOSE BLDC GLUCOMTR-MCNC: 126 MG/DL — HIGH (ref 70–99)
GLUCOSE BLDC GLUCOMTR-MCNC: 159 MG/DL — HIGH (ref 70–99)
GLUCOSE BLDC GLUCOMTR-MCNC: 170 MG/DL — HIGH (ref 70–99)
GLUCOSE BLDC GLUCOMTR-MCNC: 175 MG/DL — HIGH (ref 70–99)
GLUCOSE BLDC GLUCOMTR-MCNC: 83 MG/DL — SIGNIFICANT CHANGE UP (ref 70–99)
GLUCOSE SERPL-MCNC: 83 MG/DL — SIGNIFICANT CHANGE UP (ref 70–115)
HCT VFR BLD CALC: 28.9 % — LOW (ref 37–47)
HGB BLD-MCNC: 8.5 G/DL — LOW (ref 12–16)
LYMPHOCYTES # BLD AUTO: 0.9 K/UL — LOW (ref 1–4.8)
LYMPHOCYTES # BLD AUTO: 14 % — LOW (ref 20–55)
MAGNESIUM SERPL-MCNC: 2.1 MG/DL — SIGNIFICANT CHANGE UP (ref 1.6–2.6)
MCHC RBC-ENTMCNC: 26.2 PG — LOW (ref 27–31)
MCHC RBC-ENTMCNC: 29.4 G/DL — LOW (ref 32–36)
MCV RBC AUTO: 88.9 FL — SIGNIFICANT CHANGE UP (ref 81–99)
MONOCYTES # BLD AUTO: 0.5 K/UL — SIGNIFICANT CHANGE UP (ref 0–0.8)
MONOCYTES NFR BLD AUTO: 7.1 % — SIGNIFICANT CHANGE UP (ref 3–10)
NEUTROPHILS # BLD AUTO: 4.9 K/UL — SIGNIFICANT CHANGE UP (ref 1.8–8)
NEUTROPHILS NFR BLD AUTO: 75.6 % — HIGH (ref 37–73)
PHOSPHATE SERPL-MCNC: 3.7 MG/DL — SIGNIFICANT CHANGE UP (ref 2.4–4.7)
PLATELET # BLD AUTO: 408 K/UL — HIGH (ref 150–400)
POTASSIUM SERPL-MCNC: 5 MMOL/L — SIGNIFICANT CHANGE UP (ref 3.5–5.3)
POTASSIUM SERPL-SCNC: 5 MMOL/L — SIGNIFICANT CHANGE UP (ref 3.5–5.3)
RAPID RVP RESULT: SIGNIFICANT CHANGE UP
RBC # BLD: 3.25 M/UL — LOW (ref 4.4–5.2)
RBC # FLD: 18.6 % — HIGH (ref 11–15.6)
SODIUM SERPL-SCNC: 132 MMOL/L — LOW (ref 135–145)
WBC # BLD: 6.5 K/UL — SIGNIFICANT CHANGE UP (ref 4.8–10.8)
WBC # FLD AUTO: 6.5 K/UL — SIGNIFICANT CHANGE UP (ref 4.8–10.8)

## 2018-10-22 PROCEDURE — 99233 SBSQ HOSP IP/OBS HIGH 50: CPT

## 2018-10-22 PROCEDURE — 99232 SBSQ HOSP IP/OBS MODERATE 35: CPT

## 2018-10-22 RX ORDER — LOSARTAN POTASSIUM 100 MG/1
50 TABLET, FILM COATED ORAL ONCE
Qty: 0 | Refills: 0 | Status: COMPLETED | OUTPATIENT
Start: 2018-10-22 | End: 2018-10-22

## 2018-10-22 RX ADMIN — CALCITRIOL 0.25 MICROGRAM(S): 0.5 CAPSULE ORAL at 13:03

## 2018-10-22 RX ADMIN — IRON SUCROSE 210 MILLIGRAM(S): 20 INJECTION, SOLUTION INTRAVENOUS at 23:30

## 2018-10-22 RX ADMIN — Medication 1 TABLET(S): at 23:33

## 2018-10-22 RX ADMIN — INSULIN GLARGINE 10 UNIT(S): 100 INJECTION, SOLUTION SUBCUTANEOUS at 23:30

## 2018-10-22 RX ADMIN — Medication 1 TABLET(S): at 13:03

## 2018-10-22 RX ADMIN — PANTOPRAZOLE SODIUM 40 MILLIGRAM(S): 20 TABLET, DELAYED RELEASE ORAL at 06:02

## 2018-10-22 RX ADMIN — HEPARIN SODIUM 5000 UNIT(S): 5000 INJECTION INTRAVENOUS; SUBCUTANEOUS at 18:09

## 2018-10-22 RX ADMIN — Medication 2: at 18:06

## 2018-10-22 RX ADMIN — Medication 2: at 13:07

## 2018-10-22 RX ADMIN — HEPARIN SODIUM 5000 UNIT(S): 5000 INJECTION INTRAVENOUS; SUBCUTANEOUS at 06:03

## 2018-10-22 RX ADMIN — CARVEDILOL PHOSPHATE 12.5 MILLIGRAM(S): 80 CAPSULE, EXTENDED RELEASE ORAL at 06:02

## 2018-10-22 RX ADMIN — Medication 81 MILLIGRAM(S): at 13:04

## 2018-10-22 RX ADMIN — CEFEPIME 100 MILLIGRAM(S): 1 INJECTION, POWDER, FOR SOLUTION INTRAMUSCULAR; INTRAVENOUS at 18:07

## 2018-10-22 RX ADMIN — ERYTHROPOIETIN 10000 UNIT(S): 10000 INJECTION, SOLUTION INTRAVENOUS; SUBCUTANEOUS at 06:02

## 2018-10-22 RX ADMIN — LOSARTAN POTASSIUM 50 MILLIGRAM(S): 100 TABLET, FILM COATED ORAL at 02:06

## 2018-10-22 RX ADMIN — CHLORHEXIDINE GLUCONATE 1 APPLICATION(S): 213 SOLUTION TOPICAL at 06:06

## 2018-10-22 RX ADMIN — Medication 1 TABLET(S): at 06:02

## 2018-10-22 NOTE — PROGRESS NOTE ADULT - SUBJECTIVE AND OBJECTIVE BOX
NEPHROLOGY INTERVAL HPI/OVERNIGHT EVENTS: No new events.    MEDICATIONS  (STANDING):  ALBUTerol/ipratropium for Nebulization 3 milliLiter(s) Nebulizer every 6 hours  aspirin enteric coated 81 milliGRAM(s) Oral daily  calcitriol   Capsule 0.25 MICROGram(s) Oral daily  calcium carbonate   1250 mG (OsCal) 1 Tablet(s) Oral three times a day  carvedilol 12.5 milliGRAM(s) Oral every 12 hours  chlorhexidine 2% Cloths 1 Application(s) Topical <User Schedule>  dextrose 5%. 1000 milliLiter(s) (50 mL/Hr) IV Continuous <Continuous>  dextrose 50% Injectable 12.5 Gram(s) IV Push once  dextrose 50% Injectable 25 Gram(s) IV Push once  dextrose 50% Injectable 25 Gram(s) IV Push once  epoetin gian Injectable 23241 Unit(s) IV Push <User Schedule>  heparin  Injectable 5000 Unit(s) SubCutaneous every 12 hours  influenza   Vaccine 0.5 milliLiter(s) IntraMuscular once  insulin glargine Injectable (LANTUS) 10 Unit(s) SubCutaneous at bedtime  insulin lispro (HumaLOG) corrective regimen sliding scale   SubCutaneous three times a day before meals  insulin lispro (HumaLOG) corrective regimen sliding scale   SubCutaneous at bedtime  iron sucrose IVPB 100 milliGRAM(s) IV Intermittent every 24 hours  pantoprazole    Tablet 40 milliGRAM(s) Oral before breakfast  sacubitril 97 mG/valsartan 103 mG 1 Tablet(s) Oral two times a day    MEDICATIONS  (PRN):  dextrose 40% Gel 15 Gram(s) Oral once PRN Blood Glucose LESS THAN 70 milliGRAM(s)/deciliter  docusate sodium 100 milliGRAM(s) Oral two times a day PRN Constipation  glucagon  Injectable 1 milliGRAM(s) IntraMuscular once PRN Glucose LESS THAN 70 milligrams/deciliter  senna 2 Tablet(s) Oral at bedtime PRN Constipation      Allergies    No Known Allergies            Vital Signs Last 24 Hrs  T(C): 36.6 (22 Oct 2018 07:55), Max: 36.9 (22 Oct 2018 00:11)  T(F): 97.8 (22 Oct 2018 07:55), Max: 98.4 (22 Oct 2018 00:11)  HR: 74 (22 Oct 2018 07:55) (70 - 82)  BP: 128/80 (22 Oct 2018 07:55) (128/80 - 147/85)  BP(mean): --  RR: 18 (22 Oct 2018 07:55) (18 - 24)  SpO2: 96% (22 Oct 2018 07:55) (92% - 100%)    T(C): 37.1 (20 Oct 2018 07:47), Max: 37.4 (19 Oct 2018 23:59)  T(F): 98.7 (20 Oct 2018 07:47), Max: 99.4 (19 Oct 2018 23:59)  HR: 78 (20 Oct 2018 09:00) (70 - 90)  BP: 111/65 (20 Oct 2018 07:47) (111/65 - 156/78)  BP(mean): --  RR: 18 (20 Oct 2018 07:47) (18 - 18)  SpO2: 94% (19 Oct 2018 23:59) (93% - 96%)  T(C): 36.8 (19 Oct 2018 07:55), Max: 36.9 (18 Oct 2018 15:08)  T(F): 98.2 (19 Oct 2018 07:55), Max: 98.4 (18 Oct 2018 15:08)  HR: 78 (19 Oct 2018 07:55) (74 - 79)  BP: 147/76 (19 Oct 2018 07:55) (144/83 - 157/88)  BP(mean): --  RR: 18 (19 Oct 2018 07:55) (18 - 18)  SpO2: 92% (19 Oct 2018 07:55) (92% - 100%)  Daily     Daily Weight in k.2 (19 Oct 2018 07:55)    PHYSICAL EXAM:    GENERAL: appears chronically ill  HEAD:  wnl  EYES:   ENMT:   NECK: veins not full  NERVOUS SYSTEM:  awake, alert  CHEST/LUNG: rales left base  HEART: murmur same, no rub  ABDOMEN: soft, not tender  EXTREMITIES:  left arm access with bruit, no leg edema  LYMPH:   SKIN: no rash    LABS:    10-20    134<L>  |  94<L>  |  36.0<H>  ----------------------------<  169<H>  4.4   |  28.0  |  3.77<H>    Ca    8.5<L>      20 Oct 2018 07:18                            9.0    7.8   )-----------( 421      ( 19 Oct 2018 07:38 )             28.9     10-19    134<L>  |  93<L>  |  41.0<H>  ----------------------------<  122<H>  4.0   |  26.0  |  4.26<H>    Ca    8.9      19 Oct 2018 07:38  Phos  2.3     10-18                  RADIOLOGY & ADDITIONAL TESTS:

## 2018-10-22 NOTE — PROGRESS NOTE ADULT - SUBJECTIVE AND OBJECTIVE BOX
CC: SOB     HPI:  58F presented with dyspnea and cough productive of phlegm with evidence of blood. Chest Xray noted an unchanged moderate right-sided pleural effusion, improved bilateral lower lobe infiltrates. CT of the chest noted extensive patchy bilateral centrilobular ground glass airspace disease and mild scattered interlobular septal thickening, complete passive atelectasis of the right lower lobe, resolution of right upper and middle lobe consolidated infiltrates, interval development of mild right middle lobe and left lingular parenchymal scarring with associated mild-moderate traction bronchiectasis, moderate right and small left pleural effusions, moderate-severe cardiomegaly, interval development of diffuse moderate subcutaneous edema. The patient was seen by Cardiology in consultation for heart failure. The patient was seen by Hematology in consultation for anemia and packed red blood cells were transfused.     INTERVAL HPI/OVERNIGHT EVENTS: Patient seen and examined lying in bed.  Patient complaining of dizziness when ambulating.  Patient denies any headache, SOB, CP, abdominal pain, nausea, vomiting.  Other ROS reviewed and are negative.       Vital Signs Last 24 Hrs  T(C): 36.9 (22 Oct 2018 15:24), Max: 36.9 (22 Oct 2018 00:11)  T(F): 98.4 (22 Oct 2018 15:24), Max: 98.4 (22 Oct 2018 00:11)  HR: 72 (22 Oct 2018 15:24) (72 - 82)  BP: 139/77 (22 Oct 2018 15:24) (128/80 - 147/85)  BP(mean): --  RR: 18 (22 Oct 2018 15:24) (18 - 24)  SpO2: 96% (22 Oct 2018 15:24) (92% - 96%)  I&O's Detail    PHYSICAL EXAM:  GENERAL: NAD  HEAD:  Atraumatic, Normocephalic  NECK: Supple, No JVD, Normal thyroid  NERVOUS SYSTEM:  Alert & Oriented X3, Good concentration; Motor Strength 5/5 B/L upper and lower extremities  CHEST/LUNG: Clear to auscultation bilaterally; No rales, rhonchi, wheezing, or rubs  HEART: Regular rate and rhythm; No murmurs, rubs, or gallops  ABDOMEN: Soft, Nontender, Nondistended; Bowel sounds present  EXTREMITIES:  2+ Peripheral Pulses, No clubbing, cyanosis, or edema                              8.5    6.5   )-----------( 408      ( 22 Oct 2018 07:42 )             28.9     22 Oct 2018 07:42    132    |  93     |  49.0   ----------------------------<  83     5.0     |  25.0   |  5.11     Ca    9.2        22 Oct 2018 07:42  Phos  3.7       22 Oct 2018 07:42  Mg     2.1       22 Oct 2018 07:42    TPro  6.9    /  Alb  2.9    /  TBili  0.7    /  DBili  x      /  AST  19     /  ALT  8      /  AlkPhos  130    21 Oct 2018 01:31      CAPILLARY BLOOD GLUCOSE  POCT Blood Glucose.: 159 mg/dL (22 Oct 2018 12:21)  POCT Blood Glucose.: 83 mg/dL (22 Oct 2018 08:38)  POCT Blood Glucose.: 192 mg/dL (21 Oct 2018 22:51)  POCT Blood Glucose.: 234 mg/dL (21 Oct 2018 18:34)    LIVER FUNCTIONS - ( 21 Oct 2018 01:31 )  Alb: 2.9 g/dL / Pro: 6.9 g/dL / ALK PHOS: 130 U/L / ALT: 8 U/L / AST: 19 U/L / GGT: x               MEDICATIONS  (STANDING):  ALBUTerol    90 MICROgram(s) HFA Inhaler 1 Puff(s) Inhalation every 4 hours  aspirin enteric coated 81 milliGRAM(s) Oral daily  calcitriol   Capsule 0.25 MICROGram(s) Oral daily  calcium carbonate   1250 mG (OsCal) 1 Tablet(s) Oral three times a day  carvedilol 12.5 milliGRAM(s) Oral every 12 hours  cefepime   IVPB 1000 milliGRAM(s) IV Intermittent every 24 hours  chlorhexidine 2% Cloths 1 Application(s) Topical <User Schedule>  dextrose 5%. 1000 milliLiter(s) (50 mL/Hr) IV Continuous <Continuous>  dextrose 50% Injectable 12.5 Gram(s) IV Push once  dextrose 50% Injectable 25 Gram(s) IV Push once  dextrose 50% Injectable 25 Gram(s) IV Push once  epoetin gian Injectable 09341 Unit(s) IV Push <User Schedule>  heparin  Injectable 5000 Unit(s) SubCutaneous every 12 hours  influenza   Vaccine 0.5 milliLiter(s) IntraMuscular once  insulin glargine Injectable (LANTUS) 10 Unit(s) SubCutaneous at bedtime  insulin lispro (HumaLOG) corrective regimen sliding scale   SubCutaneous three times a day before meals  insulin lispro (HumaLOG) corrective regimen sliding scale   SubCutaneous at bedtime  iron sucrose IVPB 100 milliGRAM(s) IV Intermittent every 24 hours  pantoprazole    Tablet 40 milliGRAM(s) Oral before breakfast  sacubitril 97 mG/valsartan 103 mG 1 Tablet(s) Oral two times a day  tiotropium 18 MICROgram(s) Capsule 1 Capsule(s) Inhalation daily    MEDICATIONS  (PRN):  ALBUTerol/ipratropium for Nebulization 3 milliLiter(s) Nebulizer every 6 hours PRN Shortness of Breath and/or Wheezing  dextrose 40% Gel 15 Gram(s) Oral once PRN Blood Glucose LESS THAN 70 milliGRAM(s)/deciliter  docusate sodium 100 milliGRAM(s) Oral two times a day PRN Constipation  glucagon  Injectable 1 milliGRAM(s) IntraMuscular once PRN Glucose LESS THAN 70 milligrams/deciliter  senna 2 Tablet(s) Oral at bedtime PRN Constipation      RADIOLOGY & ADDITIONAL TESTS:  < from: Xray Chest 1 View- PORTABLE-Routine (10.21.18 @ 09:40) >   EXAM:  XR CHEST PORTABLE ROUTINE 1V                          PROCEDURE DATE:  10/21/2018          INTERPRETATION:  History: Follow-up infiltrates.    Frontal chest.    Comparison: Earlier study performed same day at 1:24 AM and prior study   10/19/2018.    Pulmonary vascular congestion. No significant interval change moderate to   large right pleural effusion. Associated atelectasis and/or   infiltrate/lung consolidation not excluded. Unchanged patchy and hazy   opacities left mid/lower lung; effusion with overlying atelectasis and/or   infiltrate. Difficult to adequately assess heart size.    Impression:    No significant change pulmonary vascular congestion, moderate to large   right pleural effusion and underlying atelectasis and/or infiltrates not   excluded.                MIN KEITH M.D. ATTENDING RADIOLOGIST  This document has been electronically signed. Oct 21 2018 11:30AM                < end of copied text >

## 2018-10-22 NOTE — PROGRESS NOTE ADULT - ASSESSMENT
57 y/o woman with ESRD on HD, systolic CHF, CAD, DM2, HTN and HLD, here for SOB found with right sided effusions, pulmonary congestion, developed fevers.   Had multiple admission in the past and also goes to HD center regularly, so it is reasonable to cover him for HAP.     -Blood cultures neg, follow up the ones sent from [facility].   -Send sputum culture  -Legionella urinary Ag.   -Trend WBC and Temperature   -Continue Cefepime 1gm daily. 57 y/o woman with ESRD on HD, systolic CHF, CAD, DM2, HTN and HLD, here for SOB found with right sided effusions, pulmonary congestion, developed fevers.   Had multiple admission in the past and also goes to HD center regularly, so it is reasonable to cover her for HAP.     -Blood cultures neg, follow up the ones sent from [facility].   -Send sputum culture  -Legionella urinary Ag.   -Trend WBC and Temperature   -Continue Cefepime 1gm daily.

## 2018-10-22 NOTE — PROGRESS NOTE ADULT - ASSESSMENT
58F with PMH of DVT, s/p IVC filter, NICM (Cath 3/2018),  HFrEF with worsened EF Echo 8/31/2018: EF 20-25%, moderate diastolic dysfunction, moderate MR, moderate TR, pericardial effusion s/p pericardiocentesis, pericarditis,  pleural effusion s/p thoracentesis, paroxysmal atrial fibrillation not on anticoagulation due to major GIB on AC, ESRD on HD, anemia of chronic disease presented with dyspnea and cough productive of phlegm with evidence of blood. Chest Xray noted an unchanged moderate right-sided pleural effusion, improved bilateral lower lobe infiltrates. CT of the chest noted extensive patchy bilateral centrilobular groundglass airspace disease and mild scattered interlobular septal thickening, complete passive atelectasis of the right lower lobe, resolution of right upper and middle lobe consolidated infiltrates, interval development of mild right middle lobe and left lingular parenchymal scarring with associated mild-moderate traction bronchiectasis, moderate right and small left pleural effusions, moderate-severe cardiomegaly, interval development of diffuse moderate subcutaneous edema. The patient was seen by Cardiology in consultation for heart failure. The patient was seen by Hematology in consultation for anemia and packed red blood cells were transfused.       ASSESSMENT / PLAN:  ----------------------------------------    HCAP- Mild persistent wheezing despite adequate HD. Repeat Chest Xray with Increased diffuse airspace disease and increased right pleural effusion. Pulmonary edema is suspected although cannot exclude pneumonia. Pt non productive cough & desaturations on . NO fevers recoorded.   CT chest: Moderate-severe CHF/fluid overload with marked cardiomegaly, interstitial/pulmonary alveolar edema, moderate right and small left pleural effusions, and anasarca.Complete passive atelectasis of the right lower lobe. Minimal left basilar passive atelectasis.Interval development of chronic scarring of the right middle lobe and left lingula with associated traction bronchiectasis suggesting sequela of mycobacterium avium complex infection. Small amount of of upper abdominal ascites.  Temp of 101 10/21/18  Continue Cefepime   Pt with productive whitish cough with mild SOB. Desaturations yesterday, 98% on 3L NC today. NO hx of TB or hemoptysis. PT is from [facility] and last visit was 3 years ago. No diarrhea. c/o dysuria, yet makes some urine.   F/U Blood cx, RVP, sputum cx, UA, Ucx  ID consult called    Anemia - Improved with transfusion of packed red blood cells. On iron sucrose and epoetin gian. Hematology follow up noted.    ESRD - Hemodialysis as scheduled.    Heart failure - On carvedilol and sacubitril/valsartan.     Diabetes - Insulin coverage, close monitoring of blood glucose levels.    Atrial fibrillation - On aspirin. Not on AC due to GIB    Hypertension - Close blood pressure monitoring. 58F with PMH of DVT, s/p IVC filter, NICM (Cath 3/2018),  HFrEF with worsened EF Echo 8/31/2018: EF 20-25%, moderate diastolic dysfunction, moderate MR, moderate TR, pericardial effusion s/p pericardiocentesis, pericarditis,  pleural effusion s/p thoracentesis, paroxysmal atrial fibrillation not on anticoagulation due to major GIB on AC, ESRD on HD, anemia of chronic disease presented with dyspnea and cough productive of phlegm with evidence of blood. Chest Xray noted an unchanged moderate right-sided pleural effusion, improved bilateral lower lobe infiltrates. CT of the chest noted extensive patchy bilateral centrilobular groundglass airspace disease and mild scattered interlobular septal thickening, complete passive atelectasis of the right lower lobe, resolution of right upper and middle lobe consolidated infiltrates, interval development of mild right middle lobe and left lingular parenchymal scarring with associated mild-moderate traction bronchiectasis, moderate right and small left pleural effusions, moderate-severe cardiomegaly, interval development of diffuse moderate subcutaneous edema. The patient was seen by Cardiology in consultation for heart failure. The patient was seen by Hematology in consultation for anemia and packed red blood cells were transfused.       ASSESSMENT / PLAN:  ----------------------------------------    HCAP- Mild persistent wheezing despite adequate HD. Repeat Chest Xray with Increased diffuse airspace disease and increased right pleural effusion. Pulmonary edema is suspected although cannot exclude pneumonia. Pt non productive cough & desaturations on . NO fevers recoorded.   CT chest: Moderate-severe CHF/fluid overload with marked cardiomegaly, interstitial/pulmonary alveolar edema, moderate right and small left pleural effusions, and anasarca.Complete passive atelectasis of the right lower lobe. Minimal left basilar passive atelectasis.Interval development of chronic scarring of the right middle lobe and left lingula with associated traction bronchiectasis suggesting sequela of mycobacterium avium complex infection. Small amount of of upper abdominal ascites.  Temp of 101 10/21/18  Continue Cefepime   Pt with productive whitish cough with mild SOB. Desaturations yesterday, 98% on 3L NC today. NO hx of TB or hemoptysis. PT is from [facility] and last visit was 3 years ago. No diarrhea. c/o dysuria, yet makes some urine.   F/U Blood cx, RVP, sputum cx, UA, Ucx  ID consult called    Anemia - Improved with transfusion of packed red blood cells. On iron sucrose and epoetin gian. Hematology follow up noted.    ESRD - Hemodialysis as scheduled.    Heart failure - On carvedilol and sacubitril/valsartan.     Diabetes - Insulin coverage, close monitoring of blood glucose levels.    Atrial fibrillation - On aspirin. Not on AC due to GIB    Hypertension - Close blood pressure monitoring.    Dizziness - Will check orthostatics. 58F with PMH of DVT, s/p IVC filter, NICM (Cath 3/2018),  HFrEF with worsened EF Echo 8/31/2018: EF 20-25%, moderate diastolic dysfunction, moderate MR, moderate TR, pericardial effusion s/p pericardiocentesis, pericarditis,  pleural effusion s/p thoracentesis, paroxysmal atrial fibrillation not on anticoagulation due to major GIB on AC, ESRD on HD, anemia of chronic disease presented with dyspnea and cough productive of phlegm with evidence of blood. Chest Xray noted an unchanged moderate right-sided pleural effusion, improved bilateral lower lobe infiltrates. CT of the chest noted extensive patchy bilateral centrilobular groundglass airspace disease and mild scattered interlobular septal thickening, complete passive atelectasis of the right lower lobe, resolution of right upper and middle lobe consolidated infiltrates, interval development of mild right middle lobe and left lingular parenchymal scarring with associated mild-moderate traction bronchiectasis, moderate right and small left pleural effusions, moderate-severe cardiomegaly, interval development of diffuse moderate subcutaneous edema. The patient was seen by Cardiology in consultation for heart failure. The patient was seen by Hematology in consultation for anemia and packed red blood cells were transfused.       ASSESSMENT / PLAN:  ----------------------------------------    HCAP- Mild persistent wheezing despite adequate HD. Repeat Chest Xray with Increased diffuse airspace disease and increased right pleural effusion. Pulmonary edema is suspected although cannot exclude pneumonia. Pt non productive cough & desaturations on . NO fevers recoorded.   CT chest: Moderate-severe CHF/fluid overload with marked cardiomegaly, interstitial/pulmonary alveolar edema, moderate right and small left pleural effusions, and anasarca.Complete passive atelectasis of the right lower lobe. Minimal left basilar passive atelectasis.Interval development of chronic scarring of the right middle lobe and left lingula with associated traction bronchiectasis suggesting sequela of mycobacterium avium complex infection. Small amount of of upper abdominal ascites.  Temp of 101 on 10/21/18  Continue Cefepime IV  Pt with productive whitish cough with mild SOB. Desaturations yesterday, 98% on 3L NC today. NO hx of TB or hemoptysis. PT is from [facility] and last visit was 3 years ago. No diarrhea. c/o dysuria, yet makes some urine.   F/U Blood cx, RVP -ve, sputum cx, UA, Ucx  ID consult appreciated    Anemia - Improved with transfusion of packed red blood cells. On iron sucrose and epoetin gian. Hematology follow up noted.    ESRD - Hemodialysis as scheduled.    Heart failure - On carvedilol and sacubitril/valsartan.     Diabetes - Insulin coverage, close monitoring of blood glucose levels.    Atrial fibrillation - On aspirin. Not on AC due to GIB    Hypertension - Close blood pressure monitoring.    Dizziness - Will check orthostatics.

## 2018-10-22 NOTE — DIETITIAN INITIAL EVALUATION ADULT. - OTHER INFO
Pt admitted for fluid retention, SOB- ESRD on HD, CHF. Pt here for same dx one month ago. Seen by RD, provided in depth diet education (renal, low sodium). Reinforcement given today. PO intake great 100% PO.

## 2018-10-22 NOTE — DIETITIAN INITIAL EVALUATION ADULT. - PROBLEM SELECTOR PLAN 6
-TTE done on 9/21/18: LVEF 20-25%  -Strict I&O's  -Renal diet  -Continue with Entresto  -Cardiology consult, had echo recently, repeat echo if requested by cardio.

## 2018-10-22 NOTE — PROGRESS NOTE ADULT - SUBJECTIVE AND OBJECTIVE BOX
[facility]  INFECTIOUS DISEASES AND INTERNAL MEDICINE at [place]  =======================================================  Jose Carlos Servin MD  Diplomates American Board of Internal Medicine and Infectious Diseases  =======================================================    JAROCHO MORALES [id number]    Follow up: Right sided pleural effusion and atelectasis/infiltration in RLL.   Mild cough with some sputum, no hemoptysis.     Allergies:  No Known Allergies    Medications:  cefepime   IVPB 1000 milliGRAM(s) IV Intermittent every 24 hours    REVIEW OF SYSTEMS:  CONSTITUTIONAL:  No Fever or chills  HEENT:   No diplopia or blurred vision.  No earache, sore throat or runny nose.  CARDIOVASCULAR:  No chest pain or SOB  RESPIRATORY:  +cough, +sputum, no shortness of breath, PND or orthopnea.  GASTROINTESTINAL:  No nausea, vomiting or diarrhea.  GENITOURINARY:  No dysuria, frequency or urgency. No Blood in urine  MUSCULOSKELETAL:  no joint aches, no muscle pain  SKIN:  No change in skin, hair or nails.  NEUROLOGIC:  No paresthesias or weakness.  PSYCHIATRIC:  No disorder of thought or mood.  ENDOCRINE:  No heat or cold intolerance, polyuria or polydipsia.  HEMATOLOGICAL:  No easy bruising or bleeding.      Physical Exam:  ICU Vital Signs Last 24 Hrs  T(C): 36.6 (22 Oct 2018 07:55), Max: 36.9 (22 Oct 2018 00:11)  T(F): 97.8 (22 Oct 2018 07:55), Max: 98.4 (22 Oct 2018 00:11)  HR: 74 (22 Oct 2018 07:55) (70 - 82)  BP: 128/80 (22 Oct 2018 07:55) (128/80 - 147/85)  RR: 18 (22 Oct 2018 07:55) (18 - 24)  SpO2: 96% (22 Oct 2018 07:55) (92% - 100%)  GEN: NAD  HEENT: normocephalic and atraumatic. EOMI. PACO.    NECK: Supple.  No lymphadenopathy   LUNGS: decreased Breath sounds in Right lower half of lungs and left lower base  HEART: Regular rate and rhythm without murmur.  ABDOMEN: Soft, nontender, and nondistended.  Positive bowel sounds.    : No CVA tenderness  EXTREMITIES: Without any cyanosis, clubbing, rash, lesions or edema.  MSK: no joint swelling  NEUROLOGIC: grossly intact.  PSYCHIATRIC: Appropriate affect .  SKIN: No ulceration or induration present.      Labs:  10-22    132<L>  |  93<L>  |  49.0<H>  ----------------------------<  83  5.0   |  25.0  |  5.11<H>    Ca    9.2      22 Oct 2018 07:42  Phos  3.7     10-22  Mg     2.1     10-22    TPro  6.9  /  Alb  2.9<L>  /  TBili  0.7  /  DBili  x   /  AST  19  /  ALT  8   /  AlkPhos  130<H>  10-21                      8.5    6.5   )-----------( 408      ( 22 Oct 2018 07:42 )             28.9     LIVER FUNCTIONS - ( 21 Oct 2018 01:31 )  Alb: 2.9 g/dL / Pro: 6.9 g/dL / ALK PHOS: 130 U/L / ALT: 8 U/L / AST: 19 U/L / GGT: x           RECENT CULTURES:  10-22 @ 03:21        Critical access hospitalte  10-16 @ 15:53 .Blood Blood     No growth at 5 days.    All imaging and data are reviewed.   CT; 10/16  IMPRESSION:    Moderate-severe CHF/fluid overload with marked cardiomegaly,   interstitial/pulmonary alveolar edema, moderate right and small left   pleural effusions, and anasarca.  Complete passive atelectasis of the right lower lobe. Minimal left   basilar passive atelectasis.  Interval development of chronic scarring of the right middle lobe and   left lingula with associated traction bronchiectasis suggesting sequela   of mycobacterium avium complex infection.  Small amount of of upper abdominal ascites.    CXR:   Impression:  No significant change pulmonary vascular congestion, moderate to large   right pleural effusion and underlying atelectasis and/or infiltrates not   excluded. [facility]  INFECTIOUS DISEASES AND INTERNAL MEDICINE at [place]  =======================================================  Jose Carlos Servin MD  Diplomates American Board of Internal Medicine and Infectious Diseases  =======================================================    JAROCHO MORALES [id number]    Follow up: Right sided pleural effusion and atelectasis/infiltration in RLL.   Mild cough with some sputum, no hemoptysis.     Allergies:  No Known Allergies    Medications:  cefepime   IVPB 1000 milliGRAM(s) IV Intermittent every 24 hours    REVIEW OF SYSTEMS:  CONSTITUTIONAL:  No Fever or chills  HEENT:   No diplopia or blurred vision.  No earache, sore throat or runny nose.  CARDIOVASCULAR:  No chest pain or SOB  RESPIRATORY:  +cough, +sputum, no shortness of breath, PND or orthopnea.  GASTROINTESTINAL:  No nausea, vomiting or diarrhea.  GENITOURINARY:  No dysuria, frequency or urgency. No Blood in urine  MUSCULOSKELETAL:  no joint aches, no muscle pain  SKIN:  No change in skin, hair or nails.  NEUROLOGIC:  No paresthesias or weakness.  PSYCHIATRIC:  No disorder of thought or mood.  ENDOCRINE:  No heat or cold intolerance, polyuria or polydipsia.  HEMATOLOGICAL:  No easy bruising or bleeding.      Physical Exam:  ICU Vital Signs Last 24 Hrs  T(C): 36.6 (22 Oct 2018 07:55), Max: 36.9 (22 Oct 2018 00:11)  T(F): 97.8 (22 Oct 2018 07:55), Max: 98.4 (22 Oct 2018 00:11)  HR: 74 (22 Oct 2018 07:55) (70 - 82)  BP: 128/80 (22 Oct 2018 07:55) (128/80 - 147/85)  RR: 18 (22 Oct 2018 07:55) (18 - 24)  SpO2: 96% (22 Oct 2018 07:55) (92% - 100%)  GEN: NAD  HEENT: normocephalic and atraumatic. EOMI. PACO.    NECK: Supple.  No lymphadenopathy   LUNGS: decreased Breath sounds in Right lower half of lung and left lower base  HEART: Regular rate and rhythm 2/6 systolic murmur.  ABDOMEN: Soft, nontender, and nondistended.  Positive bowel sounds.    : No CVA tenderness  EXTREMITIES: Without any cyanosis, clubbing, rash, lesions or edema.  MSK: no joint swelling  NEUROLOGIC: grossly intact.  PSYCHIATRIC: Appropriate affect .  SKIN: No ulceration or induration present.      Labs:  10-22    132<L>  |  93<L>  |  49.0<H>  ----------------------------<  83  5.0   |  25.0  |  5.11<H>    Ca    9.2      22 Oct 2018 07:42  Phos  3.7     10-22  Mg     2.1     10-22    TPro  6.9  /  Alb  2.9<L>  /  TBili  0.7  /  DBili  x   /  AST  19  /  ALT  8   /  AlkPhos  130<H>  10-21                      8.5    6.5   )-----------( 408      ( 22 Oct 2018 07:42 )             28.9     LIVER FUNCTIONS - ( 21 Oct 2018 01:31 )  Alb: 2.9 g/dL / Pro: 6.9 g/dL / ALK PHOS: 130 U/L / ALT: 8 U/L / AST: 19 U/L / GGT: x           RECENT CULTURES:  10-22 @ 03:21        NotDete  10-16 @ 15:53 .Blood Blood     No growth at 5 days.    All imaging and data are reviewed.   CT; 10/16  IMPRESSION:    Moderate-severe CHF/fluid overload with marked cardiomegaly,   interstitial/pulmonary alveolar edema, moderate right and small left   pleural effusions, and anasarca.  Complete passive atelectasis of the right lower lobe. Minimal left   basilar passive atelectasis.  Interval development of chronic scarring of the right middle lobe and   left lingula with associated traction bronchiectasis suggesting sequela   of mycobacterium avium complex infection.  Small amount of of upper abdominal ascites.    CXR:   Impression:  No significant change pulmonary vascular congestion, moderate to large   right pleural effusion and underlying atelectasis and/or infiltrates not   excluded.

## 2018-10-22 NOTE — DIETITIAN INITIAL EVALUATION ADULT. - PROBLEM SELECTOR PLAN 1
> Admit to medicine service under Dr. Iyer. dyspnea is likely from fluid overload from ESRd, Dialysis in am will likely improve pt's symptoms. will repeat cxr after dialysis for rt. pleural effusion which is moderate at this point. clinically pt. not in acute resp. distress.   > Bed:  Monitor w/ continuous pulse ox   > Diet:  Renal  > Activity: ambulate as tolerated  > Nursing: vitals per routine  > Oxygen: O2 via NC; keep O2 saturation > 92%  > Labs: CBC, CMP, PT/INR, PTT, Mg, Phos, blood culture  > Imaging: CT chest w/o contrast: moderate-severe chf/ fluid overload, moderate R side and small L side pleural effusion, CXR: moderate RS pleural effusion, improved b/l left lower lobe infiltrate R>L.  >EKG: no afib  > Consults: Nephrology

## 2018-10-22 NOTE — PROGRESS NOTE ADULT - SUBJECTIVE AND OBJECTIVE BOX
JAROCHO MORALES  58y  Female  [id number]  FU anemia of CKD.    Patient is a 58y old  Female who presents with a chief complaint of SOB (17 Oct 2018 01:12)    HPI:  Pt is a 59 Y/O F w/ PMHx of ESRD (on HD MWF), CAD, CHF-systolic, IDDM2, HTN, HLD, paroxysmal Afib, h/o GI bleed and PNA w/ parapneumonic effusion s/p chest tube placement who presents to the ED w/ SOB and productive cough some yellow phlegm, streak of blood ?  no fever , no chills.  SOB is worse on exertion. Pt was seen by nurse at home today, on examination pt was noted to reduced breath sounds on right and was advised to come in for PNA r/o. Pt reports mild headache and denies any abd. pain. no n/v/d. no dark stools. no blood per rectum. . No other complaints. Pt was recently admitted at [facility] in 9/18 for SOB. pt. is not on long term AC for PAF for h/o gi bleed. (16 Oct 2018 21:07)  Asked to see for severe anemia initially in the ED.  Denies bleeding  Symptoms improved post blood transfusion.  Hb remains stable, currently 7.8  Offers no new complaints-denies chest pain or SOB  Hemoglobin 8.5, on ESAs through renal.  No bleeding.    PAST MEDICAL & SURGICAL HISTORY:  Coronary artery disease, angina presence unspecified, unspecified vessel or lesion type, unspecified whether native or transplanted heart  Paroxysmal atrial fibrillation  Anemia due to chronic kidney disease, unspecified CKD stage  Chronic systolic congestive heart failure  Pleural effusion  Pericardial effusion  End stage renal disease on dialysis  HLD (hyperlipidemia)  HTN (hypertension)  DM (diabetes mellitus)  A-V fistula  Encounter for dialysis catheter care    FAMILY HISTORY:  Family history of kidney disease in brother (Sibling)    REVIEW OF SYSTEMS  Generalized   weakness  Nausea  No overt bleeding  No pain  Cardiac. Neg  Respiratory.  Neg.  GI Neg.	  All other ROS is neg.      PHYSICAL EXAM:  Alert and oriented  Positive pallor  No jaundice  NO LNs  Lungs: Clear  Heart: RR  Abd: Question of splenomegaly or a left flank mass.  LEs: No CCE or DVT clinically.    CBC Full  -  ( 22 Oct 2018 07:42 )  WBC Count : 6.5 K/uL  Hemoglobin : 8.5 g/dL  Hematocrit : 28.9 %  Platelet Count - Automated : 408 K/uL  Mean Cell Volume : 88.9 fl  Mean Cell Hemoglobin : 26.2 pg  Mean Cell Hemoglobin Concentration : 29.4 g/dL  Auto Neutrophil # : 4.9 K/uL  Auto Lymphocyte # : 0.9 K/uL  Auto Monocyte # : 0.5 K/uL  Auto Eosinophil # : 0.2 K/uL  Auto Basophil # : 0.0 K/uL  Auto Neutrophil % : 75.6 %  Auto Lymphocyte % : 14.0 %  Auto Monocyte % : 7.1 %  Auto Eosinophil % : 2.8 %  Auto Basophil % : 0.3 %    10-22    132<L>  |  93<L>  |  49.0<H>  ----------------------------<  83  5.0   |  25.0  |  5.11<H>    Ca    9.2      22 Oct 2018 07:42  Phos  3.7     10-22  Mg     2.1     10-22    TPro  6.9  /  Alb  2.9<L>  /  TBili  0.7  /  DBili  x   /  AST  19  /  ALT  8   /  AlkPhos  130<H>  10-21            Anemia of CKD  Better post transfusion, improved to 8.5G  Suggest Procrit/IV iron as deemed appropriate by renal.

## 2018-10-23 LAB
ANION GAP SERPL CALC-SCNC: 11 MMOL/L — SIGNIFICANT CHANGE UP (ref 5–17)
BASOPHILS # BLD AUTO: 0 K/UL — SIGNIFICANT CHANGE UP (ref 0–0.2)
BASOPHILS NFR BLD AUTO: 0.4 % — SIGNIFICANT CHANGE UP (ref 0–2)
BUN SERPL-MCNC: 26 MG/DL — HIGH (ref 8–20)
CALCIUM SERPL-MCNC: 8.3 MG/DL — LOW (ref 8.6–10.2)
CHLORIDE SERPL-SCNC: 94 MMOL/L — LOW (ref 98–107)
CO2 SERPL-SCNC: 27 MMOL/L — SIGNIFICANT CHANGE UP (ref 22–29)
CREAT SERPL-MCNC: 3.55 MG/DL — HIGH (ref 0.5–1.3)
EOSINOPHIL # BLD AUTO: 0.2 K/UL — SIGNIFICANT CHANGE UP (ref 0–0.5)
EOSINOPHIL NFR BLD AUTO: 3.8 % — SIGNIFICANT CHANGE UP (ref 0–6)
GLUCOSE BLDC GLUCOMTR-MCNC: 145 MG/DL — HIGH (ref 70–99)
GLUCOSE BLDC GLUCOMTR-MCNC: 162 MG/DL — HIGH (ref 70–99)
GLUCOSE BLDC GLUCOMTR-MCNC: 182 MG/DL — HIGH (ref 70–99)
GLUCOSE BLDC GLUCOMTR-MCNC: 199 MG/DL — HIGH (ref 70–99)
GLUCOSE SERPL-MCNC: 208 MG/DL — HIGH (ref 70–115)
HCT VFR BLD CALC: 29 % — LOW (ref 37–47)
HGB BLD-MCNC: 8.5 G/DL — LOW (ref 12–16)
LYMPHOCYTES # BLD AUTO: 0.7 K/UL — LOW (ref 1–4.8)
LYMPHOCYTES # BLD AUTO: 12.4 % — LOW (ref 20–55)
MAGNESIUM SERPL-MCNC: 1.8 MG/DL — SIGNIFICANT CHANGE UP (ref 1.6–2.6)
MCHC RBC-ENTMCNC: 25.8 PG — LOW (ref 27–31)
MCHC RBC-ENTMCNC: 29.3 G/DL — LOW (ref 32–36)
MCV RBC AUTO: 88.1 FL — SIGNIFICANT CHANGE UP (ref 81–99)
MONOCYTES # BLD AUTO: 0.5 K/UL — SIGNIFICANT CHANGE UP (ref 0–0.8)
MONOCYTES NFR BLD AUTO: 9.9 % — SIGNIFICANT CHANGE UP (ref 3–10)
NEUTROPHILS # BLD AUTO: 3.9 K/UL — SIGNIFICANT CHANGE UP (ref 1.8–8)
NEUTROPHILS NFR BLD AUTO: 73.3 % — HIGH (ref 37–73)
PHOSPHATE SERPL-MCNC: 2.3 MG/DL — LOW (ref 2.4–4.7)
PLATELET # BLD AUTO: 388 K/UL — SIGNIFICANT CHANGE UP (ref 150–400)
POTASSIUM SERPL-MCNC: 4.9 MMOL/L — SIGNIFICANT CHANGE UP (ref 3.5–5.3)
POTASSIUM SERPL-SCNC: 4.9 MMOL/L — SIGNIFICANT CHANGE UP (ref 3.5–5.3)
RBC # BLD: 3.29 M/UL — LOW (ref 4.4–5.2)
RBC # FLD: 18.2 % — HIGH (ref 11–15.6)
SODIUM SERPL-SCNC: 132 MMOL/L — LOW (ref 135–145)
WBC # BLD: 5.3 K/UL — SIGNIFICANT CHANGE UP (ref 4.8–10.8)
WBC # FLD AUTO: 5.3 K/UL — SIGNIFICANT CHANGE UP (ref 4.8–10.8)

## 2018-10-23 PROCEDURE — 99232 SBSQ HOSP IP/OBS MODERATE 35: CPT

## 2018-10-23 RX ADMIN — Medication 2: at 17:48

## 2018-10-23 RX ADMIN — SACUBITRIL AND VALSARTAN 1 TABLET(S): 24; 26 TABLET, FILM COATED ORAL at 18:52

## 2018-10-23 RX ADMIN — Medication 1 TABLET(S): at 21:06

## 2018-10-23 RX ADMIN — HEPARIN SODIUM 5000 UNIT(S): 5000 INJECTION INTRAVENOUS; SUBCUTANEOUS at 05:44

## 2018-10-23 RX ADMIN — CARVEDILOL PHOSPHATE 12.5 MILLIGRAM(S): 80 CAPSULE, EXTENDED RELEASE ORAL at 18:51

## 2018-10-23 RX ADMIN — Medication 2: at 09:11

## 2018-10-23 RX ADMIN — CHLORHEXIDINE GLUCONATE 1 APPLICATION(S): 213 SOLUTION TOPICAL at 05:00

## 2018-10-23 RX ADMIN — HEPARIN SODIUM 5000 UNIT(S): 5000 INJECTION INTRAVENOUS; SUBCUTANEOUS at 18:51

## 2018-10-23 RX ADMIN — CALCITRIOL 0.25 MICROGRAM(S): 0.5 CAPSULE ORAL at 11:58

## 2018-10-23 RX ADMIN — Medication 1 TABLET(S): at 05:44

## 2018-10-23 RX ADMIN — CARVEDILOL PHOSPHATE 12.5 MILLIGRAM(S): 80 CAPSULE, EXTENDED RELEASE ORAL at 05:45

## 2018-10-23 RX ADMIN — Medication 1 TABLET(S): at 12:58

## 2018-10-23 RX ADMIN — Medication 81 MILLIGRAM(S): at 11:58

## 2018-10-23 RX ADMIN — CEFEPIME 100 MILLIGRAM(S): 1 INJECTION, POWDER, FOR SOLUTION INTRAMUSCULAR; INTRAVENOUS at 18:52

## 2018-10-23 RX ADMIN — PANTOPRAZOLE SODIUM 40 MILLIGRAM(S): 20 TABLET, DELAYED RELEASE ORAL at 05:45

## 2018-10-23 RX ADMIN — INSULIN GLARGINE 10 UNIT(S): 100 INJECTION, SOLUTION SUBCUTANEOUS at 21:06

## 2018-10-23 RX ADMIN — SACUBITRIL AND VALSARTAN 1 TABLET(S): 24; 26 TABLET, FILM COATED ORAL at 05:44

## 2018-10-23 NOTE — PROGRESS NOTE ADULT - SUBJECTIVE AND OBJECTIVE BOX
JAROCHO MORALES  58y  Female  [id number]  FU anemia of CKD.    Patient is a 58y old  Female who presents with a chief complaint of SOB (17 Oct 2018 01:12)    HPI:  Pt is a 59 Y/O F w/ PMHx of ESRD (on HD MWF), CAD, CHF-systolic, IDDM2, HTN, HLD, paroxysmal Afib, h/o GI bleed and PNA w/ parapneumonic effusion s/p chest tube placement who presents to the ED w/ SOB and productive cough some yellow phlegm, streak of blood ?  no fever , no chills.  SOB is worse on exertion. Pt was seen by nurse at home today, on examination pt was noted to reduced breath sounds on right and was advised to come in for PNA r/o. Pt reports mild headache and denies any abd. pain. no n/v/d. no dark stools. no blood per rectum. . No other complaints. Pt was recently admitted at [facility] in 9/18 for SOB. pt. is not on long term AC for PAF for h/o gi bleed. (16 Oct 2018 21:07)  Asked to see for severe anemia initially in the ED      INTERVAL HISTORY.  Denies bleeding  Hemoglobin 8.5, on ESAs through renal.  No bleeding.    PAST MEDICAL & SURGICAL HISTORY:  Coronary artery disease, angina presence unspecified, unspecified vessel or lesion type, unspecified whether native or transplanted heart  Paroxysmal atrial fibrillation  Anemia due to chronic kidney disease, unspecified CKD stage  Chronic systolic congestive heart failure  Pleural effusion  Pericardial effusion  End stage renal disease on dialysis  HLD (hyperlipidemia)  HTN (hypertension)  DM (diabetes mellitus)  A-V fistula  Encounter for dialysis catheter care    FAMILY HISTORY:  Family history of kidney disease in brother (Sibling)    REVIEW OF SYSTEMS  Generalized   weakness  Nausea  No overt bleeding  No pain  Cardiac. Neg  Respiratory.  Neg.  GI Neg.	  All other ROS is neg.    MEDICATIONS  (STANDING):  ALBUTerol    90 MICROgram(s) HFA Inhaler 1 Puff(s) Inhalation every 4 hours  aspirin enteric coated 81 milliGRAM(s) Oral daily  calcitriol   Capsule 0.25 MICROGram(s) Oral daily  calcium carbonate   1250 mG (OsCal) 1 Tablet(s) Oral three times a day  carvedilol 12.5 milliGRAM(s) Oral every 12 hours  cefepime   IVPB 1000 milliGRAM(s) IV Intermittent every 24 hours  chlorhexidine 2% Cloths 1 Application(s) Topical <User Schedule>  dextrose 5%. 1000 milliLiter(s) (50 mL/Hr) IV Continuous <Continuous>  dextrose 50% Injectable 12.5 Gram(s) IV Push once  dextrose 50% Injectable 25 Gram(s) IV Push once  dextrose 50% Injectable 25 Gram(s) IV Push once  epoetin gian Injectable 60276 Unit(s) IV Push <User Schedule>  heparin  Injectable 5000 Unit(s) SubCutaneous every 12 hours  influenza   Vaccine 0.5 milliLiter(s) IntraMuscular once  insulin glargine Injectable (LANTUS) 10 Unit(s) SubCutaneous at bedtime  insulin lispro (HumaLOG) corrective regimen sliding scale   SubCutaneous three times a day before meals  insulin lispro (HumaLOG) corrective regimen sliding scale   SubCutaneous at bedtime  pantoprazole    Tablet 40 milliGRAM(s) Oral before breakfast  sacubitril 97 mG/valsartan 103 mG 1 Tablet(s) Oral two times a day  tiotropium 18 MICROgram(s) Capsule 1 Capsule(s) Inhalation daily      PHYSICAL EXAM:  Vital Signs Last 24 Hrs  T(C): 36.7 (23 Oct 2018 08:16), Max: 37.1 (23 Oct 2018 01:20)  T(F): 98 (23 Oct 2018 08:16), Max: 98.7 (23 Oct 2018 01:20)  HR: 77 (23 Oct 2018 08:16) (71 - 77)  BP: 123/67 (23 Oct 2018 08:16) (123/67 - 156/82)  BP(mean): --  RR: 18 (23 Oct 2018 01:20) (18 - 20)  SpO2: 98% (23 Oct 2018 08:16) (96% - 99%)  Alert and oriented  Positive pallor  No jaundice  NO LNs  Lungs: Clear  Heart: RR  Abd: Question of splenomegaly or a left flank mass.  LEs: No CCE or DVT clinically.                8.5                  132  | 27.0 | 26.0         5.3   >-----------< 388     ------------------------< 208                   29.0                 4.9  | 94   | 3.55                                         Ca 8.3   Mg 1.8   Ph 2.3                Anemia of CKD. on procrit. iron stores normal  Better post transfusion, improved to 8.5G

## 2018-10-23 NOTE — PROGRESS NOTE ADULT - SUBJECTIVE AND OBJECTIVE BOX
CC: SOB    HPI:  58F presented with dyspnea and cough productive of phlegm with evidence of blood. Chest Xray noted an unchanged moderate right-sided pleural effusion, improved bilateral lower lobe infiltrates. CT of the chest noted extensive patchy bilateral centrilobular ground glass airspace disease and mild scattered interlobular septal thickening, complete passive atelectasis of the right lower lobe, resolution of right upper and middle lobe consolidated infiltrates, interval development of mild right middle lobe and left lingular parenchymal scarring with associated mild-moderate traction bronchiectasis, moderate right and small left pleural effusions, moderate-severe cardiomegaly, interval development of diffuse moderate subcutaneous edema. The patient was seen by Cardiology in consultation for heart failure. The patient was seen by Hematology in consultation for anemia and packed red blood cells were transfused.     INTERVAL HPI/OVERNIGHT EVENTS: Patient seen and examined lying in bed.  Patient reports feeling better.  Patient denies any headache, dizziness, SOB, CP, abdominal pain, nausea, vomiting.  Other ROS reviewed and are negative.    Vital Signs Last 24 Hrs  T(C): 36.7 (23 Oct 2018 08:16), Max: 37.1 (23 Oct 2018 01:20)  T(F): 98 (23 Oct 2018 08:16), Max: 98.7 (23 Oct 2018 01:20)  HR: 77 (23 Oct 2018 08:16) (71 - 77)  BP: 123/67 (23 Oct 2018 08:16) (123/67 - 156/82)  BP(mean): --  RR: 18 (23 Oct 2018 01:20) (18 - 20)  SpO2: 98% (23 Oct 2018 08:16) (96% - 99%)  I&O's Detail      PHYSICAL EXAM:  GENERAL: NAD  HEAD:  Atraumatic, Normocephalic  NECK: Supple, No JVD, Normal thyroid  NERVOUS SYSTEM:  Alert & Oriented X3, Good concentration; Motor Strength 5/5 B/L upper and lower extremities  CHEST/LUNG: Clear to auscultation bilaterally, decreased BS in bases  HEART: Regular rate and rhythm; No murmurs, rubs, or gallops  ABDOMEN: Soft, Nontender, Nondistended; Bowel sounds present  EXTREMITIES:  2+ Peripheral Pulses, No clubbing, cyanosis, or edema                            8.5    5.3   )-----------( 388      ( 23 Oct 2018 07:14 )             29.0     23 Oct 2018 07:14    132    |  94     |  26.0   ----------------------------<  208    4.9     |  27.0   |  3.55     Ca    8.3        23 Oct 2018 07:14  Phos  2.3       23 Oct 2018 07:14  Mg     1.8       23 Oct 2018 07:14        CAPILLARY BLOOD GLUCOSE  POCT Blood Glucose.: 145 mg/dL (23 Oct 2018 11:56)  POCT Blood Glucose.: 182 mg/dL (23 Oct 2018 07:59)  POCT Blood Glucose.: 126 mg/dL (22 Oct 2018 23:10)  POCT Blood Glucose.: 175 mg/dL (22 Oct 2018 21:47)  POCT Blood Glucose.: 170 mg/dL (22 Oct 2018 17:21)          MEDICATIONS  (STANDING):  ALBUTerol    90 MICROgram(s) HFA Inhaler 1 Puff(s) Inhalation every 4 hours  aspirin enteric coated 81 milliGRAM(s) Oral daily  calcitriol   Capsule 0.25 MICROGram(s) Oral daily  calcium carbonate   1250 mG (OsCal) 1 Tablet(s) Oral three times a day  carvedilol 12.5 milliGRAM(s) Oral every 12 hours  cefepime   IVPB 1000 milliGRAM(s) IV Intermittent every 24 hours  chlorhexidine 2% Cloths 1 Application(s) Topical <User Schedule>  dextrose 5%. 1000 milliLiter(s) (50 mL/Hr) IV Continuous <Continuous>  dextrose 50% Injectable 12.5 Gram(s) IV Push once  dextrose 50% Injectable 25 Gram(s) IV Push once  dextrose 50% Injectable 25 Gram(s) IV Push once  epoetin gian Injectable 77141 Unit(s) IV Push <User Schedule>  heparin  Injectable 5000 Unit(s) SubCutaneous every 12 hours  influenza   Vaccine 0.5 milliLiter(s) IntraMuscular once  insulin glargine Injectable (LANTUS) 10 Unit(s) SubCutaneous at bedtime  insulin lispro (HumaLOG) corrective regimen sliding scale   SubCutaneous three times a day before meals  insulin lispro (HumaLOG) corrective regimen sliding scale   SubCutaneous at bedtime  pantoprazole    Tablet 40 milliGRAM(s) Oral before breakfast  sacubitril 97 mG/valsartan 103 mG 1 Tablet(s) Oral two times a day  tiotropium 18 MICROgram(s) Capsule 1 Capsule(s) Inhalation daily    MEDICATIONS  (PRN):  ALBUTerol/ipratropium for Nebulization 3 milliLiter(s) Nebulizer every 6 hours PRN Shortness of Breath and/or Wheezing  dextrose 40% Gel 15 Gram(s) Oral once PRN Blood Glucose LESS THAN 70 milliGRAM(s)/deciliter  docusate sodium 100 milliGRAM(s) Oral two times a day PRN Constipation  glucagon  Injectable 1 milliGRAM(s) IntraMuscular once PRN Glucose LESS THAN 70 milligrams/deciliter  senna 2 Tablet(s) Oral at bedtime PRN Constipation

## 2018-10-23 NOTE — PROGRESS NOTE ADULT - SUBJECTIVE AND OBJECTIVE BOX
[facility]  INFECTIOUS DISEASES AND INTERNAL MEDICINE at [place]  =======================================================  Jose Carlos Servin MD  Diplomates American Board of Internal Medicine and Infectious Diseases  =======================================================    JAROCHO MORALES [id number]    Follow up: Right sided pleural effusion and atelectasis/infiltration in RLL.   Mild cough with some sputum, no hemoptysis. No SOB or fever.     Allergies:  No Known Allergies    Medications:  cefepime   IVPB 1000 milliGRAM(s) IV Intermittent every 24 hours    REVIEW OF SYSTEMS:  CONSTITUTIONAL:  No Fever or chills  HEENT:   No diplopia or blurred vision.  No earache, sore throat or runny nose.  CARDIOVASCULAR:  No chest pain or SOB  RESPIRATORY:  +cough, +sputum, no shortness of breath, PND or orthopnea.  GASTROINTESTINAL:  No nausea, vomiting or diarrhea.  GENITOURINARY:  No dysuria, frequency or urgency. No Blood in urine  MUSCULOSKELETAL:  no joint aches, no muscle pain  SKIN:  No change in skin, hair or nails.  NEUROLOGIC:  No paresthesias or weakness.  PSYCHIATRIC:  No disorder of thought or mood.  ENDOCRINE:  No heat or cold intolerance, polyuria or polydipsia.  HEMATOLOGICAL:  No easy bruising or bleeding.      Physical Exam:  Vital Signs Last 24 Hrs  T(C): 36.7 (23 Oct 2018 08:16), Max: 37.1 (23 Oct 2018 01:20)  T(F): 98 (23 Oct 2018 08:16), Max: 98.7 (23 Oct 2018 01:20)  HR: 77 (23 Oct 2018 08:16) (71 - 77)  BP: 123/67 (23 Oct 2018 08:16) (123/67 - 156/82)  RR: 18 (23 Oct 2018 01:20) (18 - 20)  SpO2: 98% (23 Oct 2018 08:16) (96% - 99%)  GEN: NAD  HEENT: normocephalic and atraumatic. EOMI. PACO.    NECK: Supple.  No lymphadenopathy   LUNGS: decreased Breath sounds in Right lower half of lung and left lower base  HEART: Regular rate and rhythm 2/6 systolic murmur.  ABDOMEN: Soft, nontender, and nondistended.  Positive bowel sounds.    : No CVA tenderness  EXTREMITIES: Without any cyanosis, clubbing, rash, lesions or edema.  MSK: no joint swelling  NEUROLOGIC: grossly intact.  PSYCHIATRIC: Appropriate affect .  SKIN: No ulceration or induration present.      Labs:  10-23    132<L>  |  94<L>  |  26.0<H>  ----------------------------<  208<H>  4.9   |  27.0  |  3.55<H>    Ca    8.3<L>      23 Oct 2018 07:14  Phos  2.3     10-23  Mg     1.8     10-2                        8.5    5.3   )-----------( 388      ( 23 Oct 2018 07:14 )             29.0     RECENT CULTURES:  10-22 @ 03:21        NotDetec  10-21 @ 01:33 .Blood Blood-Peripheral     No growth at 48 hours    10-16 @ 15:53 .Blood Blood     No growth at 5 days.      All imaging and data are reviewed.   CT; 10/16  IMPRESSION:    Moderate-severe CHF/fluid overload with marked cardiomegaly,   interstitial/pulmonary alveolar edema, moderate right and small left   pleural effusions, and anasarca.  Complete passive atelectasis of the right lower lobe. Minimal left   basilar passive atelectasis.  Interval development of chronic scarring of the right middle lobe and   left lingula with associated traction bronchiectasis suggesting sequela   of mycobacterium avium complex infection.  Small amount of of upper abdominal ascites.    CXR:   Impression:  No significant change pulmonary vascular congestion, moderate to large   right pleural effusion and underlying atelectasis and/or infiltrates not   excluded.

## 2018-10-23 NOTE — PROGRESS NOTE ADULT - SUBJECTIVE AND OBJECTIVE BOX
NEPHROLOGY INTERVAL HPI/OVERNIGHT EVENTS: No new events.    MEDICATIONS  (STANDING):  ALBUTerol/ipratropium for Nebulization 3 milliLiter(s) Nebulizer every 6 hours  aspirin enteric coated 81 milliGRAM(s) Oral daily  calcitriol   Capsule 0.25 MICROGram(s) Oral daily  calcium carbonate   1250 mG (OsCal) 1 Tablet(s) Oral three times a day  carvedilol 12.5 milliGRAM(s) Oral every 12 hours  chlorhexidine 2% Cloths 1 Application(s) Topical <User Schedule>  dextrose 5%. 1000 milliLiter(s) (50 mL/Hr) IV Continuous <Continuous>  dextrose 50% Injectable 12.5 Gram(s) IV Push once  dextrose 50% Injectable 25 Gram(s) IV Push once  dextrose 50% Injectable 25 Gram(s) IV Push once  epoetin gian Injectable 57686 Unit(s) IV Push <User Schedule>  heparin  Injectable 5000 Unit(s) SubCutaneous every 12 hours  influenza   Vaccine 0.5 milliLiter(s) IntraMuscular once  insulin glargine Injectable (LANTUS) 10 Unit(s) SubCutaneous at bedtime  insulin lispro (HumaLOG) corrective regimen sliding scale   SubCutaneous three times a day before meals  insulin lispro (HumaLOG) corrective regimen sliding scale   SubCutaneous at bedtime  iron sucrose IVPB 100 milliGRAM(s) IV Intermittent every 24 hours  pantoprazole    Tablet 40 milliGRAM(s) Oral before breakfast  sacubitril 97 mG/valsartan 103 mG 1 Tablet(s) Oral two times a day    MEDICATIONS  (PRN):  dextrose 40% Gel 15 Gram(s) Oral once PRN Blood Glucose LESS THAN 70 milliGRAM(s)/deciliter  docusate sodium 100 milliGRAM(s) Oral two times a day PRN Constipation  glucagon  Injectable 1 milliGRAM(s) IntraMuscular once PRN Glucose LESS THAN 70 milligrams/deciliter  senna 2 Tablet(s) Oral at bedtime PRN Constipation      Allergies    No Known Allergies            Vital Signs Last 24 Hrs  T(C): 36.7 (23 Oct 2018 08:16), Max: 37.1 (23 Oct 2018 01:20)  T(F): 98 (23 Oct 2018 08:16), Max: 98.7 (23 Oct 2018 01:20)  HR: 77 (23 Oct 2018 08:16) (71 - 77)  BP: 123/67 (23 Oct 2018 08:16) (123/67 - 156/82)  BP(mean): --  RR: 18 (23 Oct 2018 01:20) (18 - 20)  SpO2: 98% (23 Oct 2018 08:16) (96% - 99%)  T(C): 36.6 (22 Oct 2018 07:55), Max: 36.9 (22 Oct 2018 00:11)  T(F): 97.8 (22 Oct 2018 07:55), Max: 98.4 (22 Oct 2018 00:11)  HR: 74 (22 Oct 2018 07:55) (70 - 82)  BP: 128/80 (22 Oct 2018 07:55) (128/80 - 147/85)  BP(mean): --  RR: 18 (22 Oct 2018 07:55) (18 - 24)  SpO2: 96% (22 Oct 2018 07:55) (92% - 100%)    T(C): 37.1 (20 Oct 2018 07:47), Max: 37.4 (19 Oct 2018 23:59)  T(F): 98.7 (20 Oct 2018 07:47), Max: 99.4 (19 Oct 2018 23:59)  HR: 78 (20 Oct 2018 09:00) (70 - 90)  BP: 111/65 (20 Oct 2018 07:47) (111/65 - 156/78)  BP(mean): --  RR: 18 (20 Oct 2018 07:47) (18 - 18)  SpO2: 94% (19 Oct 2018 23:59) (93% - 96%)  T(C): 36.8 (19 Oct 2018 07:55), Max: 36.9 (18 Oct 2018 15:08)  T(F): 98.2 (19 Oct 2018 07:55), Max: 98.4 (18 Oct 2018 15:08)  HR: 78 (19 Oct 2018 07:55) (74 - 79)  BP: 147/76 (19 Oct 2018 07:55) (144/83 - 157/88)  BP(mean): --  RR: 18 (19 Oct 2018 07:55) (18 - 18)  SpO2: 92% (19 Oct 2018 07:55) (92% - 100%)  Daily     Daily Weight in k.2 (19 Oct 2018 07:55)    PHYSICAL EXAM:    GENERAL: appears chronically ill  HEAD:  wnl  EYES:   ENMT:   NECK: veins not full  NERVOUS SYSTEM:  awake, alert  CHEST/LUNG: rales both bases  HEART: murmur same, no rub  ABDOMEN: soft, not tender  EXTREMITIES:  left arm access with bruit, no leg edema  LYMPH:   SKIN: no rash    LABS:  10-23    132<L>  |  94<L>  |  26.0<H>  ----------------------------<  208<H>  4.9   |  27.0  |  3.55<H>    Ca    8.3<L>      23 Oct 2018 07:14  Phos  2.3     10-23  Mg     1.8     10-23        10-20    134<L>  |  94<L>  |  36.0<H>  ----------------------------<  169<H>  4.4   |  28.0  |  3.77<H>    Ca    8.5<L>      20 Oct 2018 07:18                            9.0    7.8   )-----------( 421      ( 19 Oct 2018 07:38 )             28.9     10    134<L>  |  93<L>  |  41.0<H>  ----------------------------<  122<H>  4.0   |  26.0  |  4.26<H>    Ca    8.9      19 Oct 2018 07:38  Phos  2.3     10                  RADIOLOGY & ADDITIONAL TESTS:

## 2018-10-23 NOTE — PROGRESS NOTE ADULT - ASSESSMENT
58F with PMH of DVT, s/p IVC filter, NICM (Cath 3/2018),  HFrEF with worsened EF Echo 8/31/2018: EF 20-25%, moderate diastolic dysfunction, moderate MR, moderate TR, pericardial effusion s/p pericardiocentesis, pericarditis,  pleural effusion s/p thoracentesis, paroxysmal atrial fibrillation not on anticoagulation due to major GIB on AC, ESRD on HD, anemia of chronic disease presented with dyspnea and cough productive of phlegm with evidence of blood. Chest Xray noted an unchanged moderate right-sided pleural effusion, improved bilateral lower lobe infiltrates. CT of the chest noted extensive patchy bilateral centrilobular groundglass airspace disease and mild scattered interlobular septal thickening, complete passive atelectasis of the right lower lobe, resolution of right upper and middle lobe consolidated infiltrates, interval development of mild right middle lobe and left lingular parenchymal scarring with associated mild-moderate traction bronchiectasis, moderate right and small left pleural effusions, moderate-severe cardiomegaly, interval development of diffuse moderate subcutaneous edema. The patient was seen by Cardiology in consultation for heart failure. The patient was seen by Hematology in consultation for anemia and packed red blood cells were transfused.       ASSESSMENT / PLAN:  ----------------------------------------    HCAP- Mild persistent wheezing despite adequate HD. Repeat Chest Xray with Increased diffuse airspace disease and increased right pleural effusion. Pulmonary edema is suspected although cannot exclude pneumonia. Pt non productive cough & desaturations on . NO fevers recoorded.   CT chest: Moderate-severe CHF/fluid overload with marked cardiomegaly, interstitial/pulmonary alveolar edema, moderate right and small left pleural effusions, and anasarca.Complete passive atelectasis of the right lower lobe. Minimal left basilar passive atelectasis.Interval development of chronic scarring of the right middle lobe and left lingula with associated traction bronchiectasis suggesting sequela of mycobacterium avium complex infection. Small amount of of upper abdominal ascites.  Temp of 101 on 10/21/18  Continue Cefepime IV to complete 7-10days  Pt with productive whitish cough with mild SOB. Desaturations yesterday, 98% on 3L NC today. NO hx of TB or hemoptysis. PT is from [facility] and last visit was 3 years ago. No diarrhea. c/o dysuria, yet makes some urine.   F/U Blood cx negative, RVP -ve, sputum cx, UA, Ucx  ID consult appreciated    Anemia - Improved with transfusion of packed red blood cells. On iron sucrose and epoetin gian. Hematology follow up noted.    ESRD - Hemodialysis as scheduled.    Heart failure - On carvedilol and sacubitril/valsartan.     Diabetes - Insulin coverage, close monitoring of blood glucose levels.    Atrial fibrillation - On aspirin. Not on AC due to GIB    Hypertension - Close blood pressure monitoring.    Dizziness - Improved; orthostatics negative.

## 2018-10-23 NOTE — PROGRESS NOTE ADULT - ASSESSMENT
59 y/o woman with ESRD on HD, systolic CHF, CAD, DM2, HTN and HLD, here for SOB found with right sided effusions, pulmonary congestion, developed fevers.   Had multiple admission in the past and also goes to HD center regularly, so it is reasonable to cover her for HAP.   Possibly partly related to CHF.     -Blood cultures neg  -Sputum culture not done  -Legionella urinary Ag. pending  -Trend WBC and Temperature   -Continue Cefepime 1gm daily to complete about 7 to 10days.

## 2018-10-24 ENCOUNTER — TRANSCRIPTION ENCOUNTER (OUTPATIENT)
Age: 58
End: 2018-10-24

## 2018-10-24 VITALS
TEMPERATURE: 98 F | RESPIRATION RATE: 18 BRPM | SYSTOLIC BLOOD PRESSURE: 129 MMHG | OXYGEN SATURATION: 93 % | DIASTOLIC BLOOD PRESSURE: 78 MMHG | HEART RATE: 80 BPM

## 2018-10-24 LAB
ANION GAP SERPL CALC-SCNC: 13 MMOL/L — SIGNIFICANT CHANGE UP (ref 5–17)
BASOPHILS # BLD AUTO: 0 K/UL — SIGNIFICANT CHANGE UP (ref 0–0.2)
BASOPHILS NFR BLD AUTO: 0.2 % — SIGNIFICANT CHANGE UP (ref 0–2)
BUN SERPL-MCNC: 39 MG/DL — HIGH (ref 8–20)
CALCIUM SERPL-MCNC: 8.9 MG/DL — SIGNIFICANT CHANGE UP (ref 8.6–10.2)
CHLORIDE SERPL-SCNC: 91 MMOL/L — LOW (ref 98–107)
CO2 SERPL-SCNC: 26 MMOL/L — SIGNIFICANT CHANGE UP (ref 22–29)
CREAT SERPL-MCNC: 4.71 MG/DL — HIGH (ref 0.5–1.3)
EOSINOPHIL # BLD AUTO: 0.2 K/UL — SIGNIFICANT CHANGE UP (ref 0–0.5)
EOSINOPHIL NFR BLD AUTO: 4.3 % — SIGNIFICANT CHANGE UP (ref 0–6)
GLUCOSE BLDC GLUCOMTR-MCNC: 107 MG/DL — HIGH (ref 70–99)
GLUCOSE BLDC GLUCOMTR-MCNC: 180 MG/DL — HIGH (ref 70–99)
GLUCOSE BLDC GLUCOMTR-MCNC: 201 MG/DL — HIGH (ref 70–99)
GLUCOSE SERPL-MCNC: 102 MG/DL — SIGNIFICANT CHANGE UP (ref 70–115)
HCT VFR BLD CALC: 28.6 % — LOW (ref 37–47)
HGB BLD-MCNC: 8.4 G/DL — LOW (ref 12–16)
LYMPHOCYTES # BLD AUTO: 1 K/UL — SIGNIFICANT CHANGE UP (ref 1–4.8)
LYMPHOCYTES # BLD AUTO: 20.3 % — SIGNIFICANT CHANGE UP (ref 20–55)
MAGNESIUM SERPL-MCNC: 1.9 MG/DL — SIGNIFICANT CHANGE UP (ref 1.6–2.6)
MCHC RBC-ENTMCNC: 25.9 PG — LOW (ref 27–31)
MCHC RBC-ENTMCNC: 29.4 G/DL — LOW (ref 32–36)
MCV RBC AUTO: 88.3 FL — SIGNIFICANT CHANGE UP (ref 81–99)
MONOCYTES # BLD AUTO: 0.5 K/UL — SIGNIFICANT CHANGE UP (ref 0–0.8)
MONOCYTES NFR BLD AUTO: 10.5 % — HIGH (ref 3–10)
NEUTROPHILS # BLD AUTO: 3 K/UL — SIGNIFICANT CHANGE UP (ref 1.8–8)
NEUTROPHILS NFR BLD AUTO: 64.5 % — SIGNIFICANT CHANGE UP (ref 37–73)
PHOSPHATE SERPL-MCNC: 3.5 MG/DL — SIGNIFICANT CHANGE UP (ref 2.4–4.7)
PLATELET # BLD AUTO: 408 K/UL — HIGH (ref 150–400)
POTASSIUM SERPL-MCNC: 5.5 MMOL/L — HIGH (ref 3.5–5.3)
POTASSIUM SERPL-SCNC: 5.5 MMOL/L — HIGH (ref 3.5–5.3)
RBC # BLD: 3.24 M/UL — LOW (ref 4.4–5.2)
RBC # FLD: 18.3 % — HIGH (ref 11–15.6)
SODIUM SERPL-SCNC: 130 MMOL/L — LOW (ref 135–145)
WBC # BLD: 4.7 K/UL — LOW (ref 4.8–10.8)
WBC # FLD AUTO: 4.7 K/UL — LOW (ref 4.8–10.8)

## 2018-10-24 PROCEDURE — 99232 SBSQ HOSP IP/OBS MODERATE 35: CPT

## 2018-10-24 PROCEDURE — 99239 HOSP IP/OBS DSCHRG MGMT >30: CPT

## 2018-10-24 RX ADMIN — Medication 2: at 12:42

## 2018-10-24 RX ADMIN — CHLORHEXIDINE GLUCONATE 1 APPLICATION(S): 213 SOLUTION TOPICAL at 06:03

## 2018-10-24 RX ADMIN — PANTOPRAZOLE SODIUM 40 MILLIGRAM(S): 20 TABLET, DELAYED RELEASE ORAL at 06:03

## 2018-10-24 RX ADMIN — Medication 81 MILLIGRAM(S): at 12:43

## 2018-10-24 RX ADMIN — Medication 1 TABLET(S): at 12:45

## 2018-10-24 RX ADMIN — HEPARIN SODIUM 5000 UNIT(S): 5000 INJECTION INTRAVENOUS; SUBCUTANEOUS at 06:02

## 2018-10-24 RX ADMIN — CARVEDILOL PHOSPHATE 12.5 MILLIGRAM(S): 80 CAPSULE, EXTENDED RELEASE ORAL at 06:03

## 2018-10-24 RX ADMIN — Medication 4: at 17:03

## 2018-10-24 RX ADMIN — CALCITRIOL 0.25 MICROGRAM(S): 0.5 CAPSULE ORAL at 12:43

## 2018-10-24 RX ADMIN — SACUBITRIL AND VALSARTAN 1 TABLET(S): 24; 26 TABLET, FILM COATED ORAL at 06:02

## 2018-10-24 RX ADMIN — Medication 1 TABLET(S): at 06:02

## 2018-10-24 RX ADMIN — ERYTHROPOIETIN 10000 UNIT(S): 10000 INJECTION, SOLUTION INTRAVENOUS; SUBCUTANEOUS at 10:06

## 2018-10-24 NOTE — PROGRESS NOTE ADULT - ATTENDING COMMENTS
HD today, labs, iron, epogen.
HD today.
HD today.
KUSHAL tomorrow.
Will sign off please call with any question.
I have personally seen and examined patient.  Above note reviewed, addended and discussed with NP, modified where appropriate.
Will follow up.
Will follow up.

## 2018-10-24 NOTE — PROGRESS NOTE ADULT - ASSESSMENT
58F with PMH of DVT, s/p IVC filter, NICM (Cath 3/2018),  HFrEF with worsened EF Echo 8/31/2018: EF 20-25%, moderate diastolic dysfunction, moderate MR, moderate TR, pericardial effusion s/p pericardiocentesis, pericarditis,  pleural effusion s/p thoracentesis, paroxysmal atrial fibrillation not on anticoagulation due to major GIB on AC, ESRD on HD, anemia of chronic disease presented with dyspnea and cough productive of phlegm with evidence of blood. Chest Xray noted an unchanged moderate right-sided pleural effusion, improved bilateral lower lobe infiltrates. CT of the chest noted extensive patchy bilateral centrilobular groundglass airspace disease and mild scattered interlobular septal thickening, complete passive atelectasis of the right lower lobe, resolution of right upper and middle lobe consolidated infiltrates, interval development of mild right middle lobe and left lingular parenchymal scarring with associated mild-moderate traction bronchiectasis, moderate right and small left pleural effusions, moderate-severe cardiomegaly, interval development of diffuse moderate subcutaneous edema. The patient was seen by Cardiology in consultation for heart failure. The patient was seen by Hematology in consultation for anemia and packed red blood cells were transfused.       ASSESSMENT / PLAN:  ----------------------------------------    HCAP- Mild persistent wheezing despite adequate HD. Repeat Chest Xray with Increased diffuse airspace disease and increased right pleural effusion. Pulmonary edema is suspected although cannot exclude pneumonia. Pt non productive cough & desaturations on . NO fevers recoorded.   CT chest: Moderate-severe CHF/fluid overload with marked cardiomegaly, interstitial/pulmonary alveolar edema, moderate right and small left pleural effusions, and anasarca.Complete passive atelectasis of the right lower lobe. Minimal left basilar passive atelectasis.Interval development of chronic scarring of the right middle lobe and left lingula with associated traction bronchiectasis suggesting sequela of mycobacterium avium complex infection. Small amount of of upper abdominal ascites.  Temp of 101 on 10/21/18  Continue Cefepime IV to complete 7 days - changed to Levaquin PO for discharge  F/U Blood cx negative, RVP -ve, sputum cx negative  ID consult appreciated    Anemia - Improved with transfusion of packed red blood cells. On iron sucrose and epoetin gian. Hematology follow up noted.    ESRD - Hemodialysis as scheduled.    Heart failure - On carvedilol and sacubitril/valsartan.     Diabetes - Insulin coverage, close monitoring of blood glucose levels.    Atrial fibrillation - On aspirin. Not on AC due to GIB    Hypertension - Close blood pressure monitoring.    Dizziness - Improved; orthostatics negative.

## 2018-10-24 NOTE — DISCHARGE NOTE ADULT - MEDICATION SUMMARY - MEDICATIONS TO TAKE
I will START or STAY ON the medications listed below when I get home from the hospital:    aspirin 81 mg oral delayed release tablet  -- 1 tab(s) by mouth once a day  -- Indication: For Coronary artery disease, angina presence unspecified, unspecified vessel or lesion type, unspecified whether native or transplanted heart    sacubitril-valsartan 97 mg-103 mg oral tablet  -- 1 tab(s) by mouth 2 times a day  -- Indication: For Chronic systolic congestive heart failure    calcium carbonate 1250 mg (500 mg elemental calcium) oral tablet  -- 1 tab(s) by mouth 3 times a day  -- Indication: For Supplement    Lantus 100 units/mL subcutaneous solution  -- 10 unit(s) subcutaneous once a day (at bedtime)  -- Indication: For Diabetes    Coreg 12.5 mg oral tablet  -- 1 tab(s) by mouth 2 times a day   -- It is very important that you take or use this exactly as directed.  Do not skip doses or discontinue unless directed by your doctor.  May cause drowsiness.  Alcohol may intensify this effect.  Use care when operating dangerous machinery.  Some non-prescription drugs may aggravate your condition.  Read all labels carefully.  If a warning appears, check with your doctor before taking.  Take with food or milk.    -- Indication: For Chronic systolic congestive heart failure    FeroSul 325 mg (65 mg elemental iron) oral tablet  -- 1 tab(s) by mouth once a day   -- Indication: For Anemia, unspecified type    Colace 100 mg oral capsule  -- 1 cap(s) by mouth 2 times a day, As Needed -for constipation   -- Indication: For Constipation    senna oral tablet  -- 2 tab(s) by mouth once a day (at bedtime), As needed, Constipation  -- Indication: For Constipation    pantoprazole 40 mg oral delayed release tablet  -- 1 tab(s) by mouth once a day (before a meal)  -- Indication: For GERD    Levaquin 250 mg oral tablet  -- 1 tab(s) by mouth every 24 hours   -- Avoid prolonged or excessive exposure to direct and/or artificial sunlight while taking this medication.  Do not take dairy products, antacids, or iron preparations within one hour of this medication.  Finish all this medication unless otherwise directed by prescriber.  May cause drowsiness or dizziness.  Medication should be taken with plenty of water.    -- Indication: For Pneumonia    calcitriol 0.25 mcg oral capsule  -- 1 cap(s) by mouth once a day  -- Indication: For Supplement

## 2018-10-24 NOTE — DISCHARGE NOTE ADULT - SECONDARY DIAGNOSIS.
Acute pulmonary edema Chronic systolic congestive heart failure Coronary artery disease, angina presence unspecified, unspecified vessel or lesion type, unspecified whether native or transplanted heart DM (diabetes mellitus) End stage renal disease on dialysis Anemia, unspecified type

## 2018-10-24 NOTE — PROGRESS NOTE ADULT - SUBJECTIVE AND OBJECTIVE BOX
[facility]  INFECTIOUS DISEASES AND INTERNAL MEDICINE at [place]  =======================================================  Jose Carlos Servin MD  Diplomates American Board of Internal Medicine and Infectious Diseases  =======================================================    JAROCHO MORALES [id number]    Follow up: Right sided pleural effusion and atelectasis/infiltration in RLL.   Afebrile, no new complaint. mild cough.     Allergies:  No Known Allergies    Medications:  cefepime   IVPB 1000 milliGRAM(s) IV Intermittent every 24 hours    REVIEW OF SYSTEMS:  CONSTITUTIONAL:  No Fever or chills  HEENT:   No diplopia or blurred vision.  No earache, sore throat or runny nose.  CARDIOVASCULAR:  No chest pain or SOB  RESPIRATORY:  +cough, +sputum, no shortness of breath, PND or orthopnea.  GASTROINTESTINAL:  No nausea, vomiting or diarrhea.  GENITOURINARY:  No dysuria, frequency or urgency. No Blood in urine  MUSCULOSKELETAL:  no joint aches, no muscle pain  SKIN:  No change in skin, hair or nails.  NEUROLOGIC:  No paresthesias or weakness.  PSYCHIATRIC:  No disorder of thought or mood.  ENDOCRINE:  No heat or cold intolerance, polyuria or polydipsia.  HEMATOLOGICAL:  No easy bruising or bleeding.      Physical Exam:  Vital Signs Last 24 Hrs  T(C): 36.9 (24 Oct 2018 08:05), Max: 37.2 (24 Oct 2018 00:23)  T(F): 98.5 (24 Oct 2018 08:05), Max: 98.9 (24 Oct 2018 00:23)  HR: 64 (24 Oct 2018 08:05) (64 - 75)  BP: 125/69 (24 Oct 2018 08:05) (125/69 - 153/85)  BP(mean): --  RR: 22 (24 Oct 2018 08:05) (18 - 29)  SpO2: 100% (24 Oct 2018 08:05) (96% - 100%)  GEN: NAD  HEENT: normocephalic and atraumatic. EOMI. PACO.    NECK: Supple.  No lymphadenopathy   LUNGS: decreased Breath sounds in Right lower half of lung and left lower base  HEART: Regular rate and rhythm 2/6 systolic murmur.  ABDOMEN: Soft, nontender, and nondistended.  Positive bowel sounds.    : No CVA tenderness  EXTREMITIES: Without any cyanosis, clubbing, rash, lesions or edema.  MSK: no joint swelling  NEUROLOGIC: grossly intact.  PSYCHIATRIC: Appropriate affect .  SKIN: No ulceration or induration present.      Labs:  10-24    130<L>  |  91<L>  |  39.0<H>  ----------------------------<  102  5.5<H>   |  26.0  |  4.71<H>    Ca    8.9      24 Oct 2018 08:11  Phos  3.5     10-24  Mg     1.9     10-24                        8.4    4.7   )-----------( 408      ( 24 Oct 2018 08:11 )             28.6     RECENT CULTURES:  10-22 @ 03:21        NotDetec  10-21 @ 01:33 .Blood Blood-Peripheral     No growth at 48 hours    10-16 @ 15:53 .Blood Blood     No growth at 5 days.      All imaging and data are reviewed.   CT; 10/16  IMPRESSION:    Moderate-severe CHF/fluid overload with marked cardiomegaly,   interstitial/pulmonary alveolar edema, moderate right and small left   pleural effusions, and anasarca.  Complete passive atelectasis of the right lower lobe. Minimal left   basilar passive atelectasis.  Interval development of chronic scarring of the right middle lobe and   left lingula with associated traction bronchiectasis suggesting sequela   of mycobacterium avium complex infection.  Small amount of of upper abdominal ascites.    CXR:   Impression:  No significant change pulmonary vascular congestion, moderate to large   right pleural effusion and underlying atelectasis and/or infiltrates not   excluded.

## 2018-10-24 NOTE — PROGRESS NOTE ADULT - ASSESSMENT
59 y/o woman with ESRD on HD, systolic CHF, CAD, DM2, HTN and HLD, here for SOB found with right sided effusions, pulmonary congestion, developed fevers.   Had multiple admission in the past and also goes to HD center regularly, so it is reasonable to cover her for HAP.     -Blood cultures neg  -Normal WBC, no fever  -Continue Cefepime 1gm daily while in the hospital  -If plan for discharge can be switched to po levaquin 250mg daily to complete total 7 days.

## 2018-10-24 NOTE — DISCHARGE NOTE ADULT - CARE PLAN
Principal Discharge DX:	Pneumonia  Goal:	Improved  Assessment and plan of treatment:	Complete antibiotic course.  Follow up with primary doctor.  Secondary Diagnosis:	Acute pulmonary edema  Assessment and plan of treatment:	Follow up with primary doctor and nephrology.  Secondary Diagnosis:	Chronic systolic congestive heart failure  Assessment and plan of treatment:	Continue current medications as prescribed.  Follow up with primary doctor and cardiology.  Secondary Diagnosis:	Coronary artery disease, angina presence unspecified, unspecified vessel or lesion type, unspecified whether native or transplanted heart  Assessment and plan of treatment:	Continue current medications as prescribed.  Follow up with primary doctor and cardiology.  Secondary Diagnosis:	DM (diabetes mellitus)  Assessment and plan of treatment:	Continue current medications as prescribed.  Follow up with primary doctor.  Secondary Diagnosis:	End stage renal disease on dialysis  Assessment and plan of treatment:	Continue current medications as prescribed.  Follow up with nephrology.  Secondary Diagnosis:	Anemia, unspecified type  Assessment and plan of treatment:	Continue current medications as prescribed.  Follow up with nephrology.

## 2018-10-24 NOTE — PROGRESS NOTE ADULT - SUBJECTIVE AND OBJECTIVE BOX
58y  Female  [id number]   anemia of CKD.    Patient is a 58y old  Female who presents with a chief complaint of SOB (17 Oct 2018 01:12)    HPI:  Pt is a 59 Y/O F w/ PMHx of ESRD (on HD MWF), CAD, CHF-systolic, IDDM2, HTN, HLD, paroxysmal Afib, h/o GI bleed and PNA w/ parapneumonic effusion s/p chest tube placement who presents to the ED w/ SOB and productive cough some yellow phlegm, streak of blood ?  no fever , no chills.  SOB is worse on exertion. Pt was seen by nurse at home today, on examination pt was noted to reduced breath sounds on right and was advised to come in for PNA r/o. Pt reports mild headache and denies any abd. pain. no n/v/d. no dark stools. no blood per rectum. . No other complaints. Pt was recently admitted at [facility] in 9/18 for SOB. pt. is not on long term AC for PAF for h/o gi bleed. (16 Oct 2018 21:07)  Asked to see for severe anemia initially in the ED  Denies bleeding  Hemoglobin 8.5, on ESAs through renal.  No bleeding.    PAST MEDICAL & SURGICAL HISTORY:  Coronary artery disease, angina presence unspecified, unspecified vessel or lesion type, unspecified whether native or transplanted heart  Paroxysmal atrial fibrillation  Anemia due to chronic kidney disease, unspecified CKD stage  Chronic systolic congestive heart failure  Pleural effusion  Pericardial effusion  End stage renal disease on dialysis  HLD (hyperlipidemia)  HTN (hypertension)  DM (diabetes mellitus)  A-V fistula  Encounter for dialysis catheter care    FAMILY HISTORY:  Family history of kidney disease in a brother    REVIEW OF SYSTEMS  Generalized   weakness  Nausea  No overt bleeding  No pain  Cardiac. Neg  Respiratory.  Neg.  GI Neg.	  All other ROS is neg.    MEDICATIONS  (STANDING):  ALBUTerol    90 MICROgram(s) HFA Inhaler 1 Puff(s) Inhalation every 4 hours  aspirin enteric coated 81 milliGRAM(s) Oral daily  calcitriol   Capsule 0.25 MICROGram(s) Oral daily  calcium carbonate   1250 mG (OsCal) 1 Tablet(s) Oral three times a day  carvedilol 12.5 milliGRAM(s) Oral every 12 hours  cefepime   IVPB 1000 milliGRAM(s) IV Intermittent every 24 hours  chlorhexidine 2% Cloths 1 Application(s) Topical <User Schedule>  dextrose 5%. 1000 milliLiter(s) (50 mL/Hr) IV Continuous <Continuous>  dextrose 50% Injectable 12.5 Gram(s) IV Push once  dextrose 50% Injectable 25 Gram(s) IV Push once  dextrose 50% Injectable 25 Gram(s) IV Push once  epoetin gian Injectable 51220 Unit(s) IV Push <User Schedule>  heparin  Injectable 5000 Unit(s) SubCutaneous every 12 hours  influenza   Vaccine 0.5 milliLiter(s) IntraMuscular once  insulin glargine Injectable (LANTUS) 10 Unit(s) SubCutaneous at bedtime  insulin lispro (HumaLOG) corrective regimen sliding scale   SubCutaneous three times a day before meals  insulin lispro (HumaLOG) corrective regimen sliding scale   SubCutaneous at bedtime  pantoprazole    Tablet 40 milliGRAM(s) Oral before breakfast  sacubitril 97 mG/valsartan 103 mG 1 Tablet(s) Oral two times a day  tiotropium 18 MICROgram(s) Capsule 1 Capsule(s) Inhalation daily      PHYSICAL EXAM:    Vital Signs Last 24 Hrs  T(C): 36.8 (24 Oct 2018 12:01), Max: 37.2 (24 Oct 2018 00:23)  T(F): 98.3 (24 Oct 2018 12:01), Max: 98.9 (24 Oct 2018 00:23)  HR: 59 (24 Oct 2018 12:01) (59 - 75)  BP: 129/74 (24 Oct 2018 12:01) (125/69 - 153/85)  BP(mean): --  RR: 18 (24 Oct 2018 12:01) (18 - 29)  SpO2: 94% (24 Oct 2018 12:01) (94% - 100%)  Alert and oriented  Positive pallor      No jaundice  NO LNs  Lungs: Clear  Heart: RR  Abd: Question of splenomegaly or a left flank mass.  LEs: No CCE or DVT clinically.    CBC Full  -  ( 24 Oct 2018 08:11 )  WBC Count : 4.7 K/uL  Hemoglobin : 8.4 g/dL  Hematocrit : 28.6 %  Platelet Count - Automated : 408 K/uL  Mean Cell Volume : 88.3 fl  Mean Cell Hemoglobin : 25.9 pg  Mean Cell Hemoglobin Concentration : 29.4 g/dL  Auto Neutrophil # : 3.0 K/uL  Auto Lymphocyte # : 1.0 K/uL  Auto Monocyte # : 0.5 K/uL  Auto Eosinophil # : 0.2 K/uL  Auto Basophil # : 0.0 K/uL  Auto Neutrophil % : 64.5 %  Auto Lymphocyte % : 20.3 %  Auto Monocyte % : 10.5 %  Auto Eosinophil % : 4.3 %  Auto Basophil % : 0.2 %    10-24    130<L>  |  91<L>  |  39.0<H>  ----------------------------<  102  5.5<H>   |  26.0  |  4.71<H>    Ca    8.9      24 Oct 2018 08:11  Phos  3.5     10-24  Mg     1.9     10-24      Anemia of CKD. on procrit. iron stores normal  Better post transfusion, improved to 8.4 G  No bleeding.                        Electronic Signatures:  Cesar Hernandez)  (Signed 23-Oct-2018 14:41)  	Authored: Progress Note, Reason for Admission, Subjective and Objective      Last Updated: 23-Oct-2018 14:41 by Cesar Hernandez)

## 2018-10-24 NOTE — DISCHARGE NOTE ADULT - PATIENT PORTAL LINK FT
You can access the [facility] Patient Portal, offered by [facility], by registering with the following website: [email or web address]

## 2018-10-24 NOTE — DISCHARGE NOTE ADULT - PLAN OF CARE
Improved Complete antibiotic course.  Follow up with primary doctor. Follow up with primary doctor and nephrology. Continue current medications as prescribed.  Follow up with primary doctor and cardiology. Continue current medications as prescribed.  Follow up with primary doctor. Continue current medications as prescribed.  Follow up with nephrology.

## 2018-10-24 NOTE — DISCHARGE NOTE ADULT - CARE PROVIDERS DIRECT ADDRESSES
,DirectAddress_Unknown,DirectAddress_Unknown,[email or web address]

## 2018-10-24 NOTE — DISCHARGE NOTE ADULT - CARE PROVIDER_API CALL
Camille Lopez), Family Medicine  [address] [place] [address]  Phone: [phone]  Fax: [phone]    Srini Gay), Internal Medicine; Nephrology  [address] [facility] [address]  [place], [place] [id number]  Phone: [phone]  Fax: [phone]    Gissel Salazar (DO), Cardiology; Internal Medicine  [address] [place] [address]  Phone: [phone]  Fax: [phone]

## 2018-10-24 NOTE — DISCHARGE NOTE ADULT - HOSPITAL COURSE
58F with PMH of DVT, s/p IVC filter, NICM (Cath 3/2018),  HFrEF with worsened EF Echo 8/31/2018: EF 20-25%, moderate diastolic dysfunction, moderate MR, moderate TR, pericardial effusion s/p pericardiocentesis, pericarditis,  pleural effusion s/p thoracentesis, paroxysmal atrial fibrillation not on anticoagulation due to major GIB on AC, ESRD on HD, anemia of chronic disease presented with dyspnea and cough productive of phlegm with evidence of blood. Chest Xray noted an unchanged moderate right-sided pleural effusion, improved bilateral lower lobe infiltrates. CT of the chest noted extensive patchy bilateral centrilobular ground glass airspace disease and mild scattered interlobular septal thickening, complete passive atelectasis of the right lower lobe, resolution of right upper and middle lobe consolidated infiltrates, interval development of mild right middle lobe and left lingular parenchymal scarring with associated mild-moderate traction bronchiectasis, moderate right and small left pleural effusions, moderate-severe cardiomegaly, interval development of diffuse moderate subcutaneous edema. The patient was seen by Cardiology in consultation for heart failure. The patient was seen by Hematology in consultation for anemia and packed red blood cells were transfused. Patient developed fever 101.  Blood culture negative.  RVP negative. Patient treated with IV Cefepime for pneumonia with improvement.  ID consulted.  Patient transitioned to oral Levaquin to complete 7day course.  Patient stable for discharge to home with outpatient follow up with primary doctor, cardiology, and nephrology.

## 2018-10-24 NOTE — PROGRESS NOTE ADULT - SUBJECTIVE AND OBJECTIVE BOX
NEPHROLOGY INTERVAL HPI/OVERNIGHT EVENTS:  pt tolerating HD well when seen earlier  no acute distress  SOB improved    MEDICATIONS  (STANDING):  ALBUTerol    90 MICROgram(s) HFA Inhaler 1 Puff(s) Inhalation every 4 hours  aspirin enteric coated 81 milliGRAM(s) Oral daily  calcitriol   Capsule 0.25 MICROGram(s) Oral daily  calcium carbonate   1250 mG (OsCal) 1 Tablet(s) Oral three times a day  carvedilol 12.5 milliGRAM(s) Oral every 12 hours  cefepime   IVPB 1000 milliGRAM(s) IV Intermittent every 24 hours  chlorhexidine 2% Cloths 1 Application(s) Topical <User Schedule>  dextrose 5%. 1000 milliLiter(s) (50 mL/Hr) IV Continuous <Continuous>  dextrose 50% Injectable 12.5 Gram(s) IV Push once  dextrose 50% Injectable 25 Gram(s) IV Push once  dextrose 50% Injectable 25 Gram(s) IV Push once  epoetin gian Injectable 30944 Unit(s) IV Push <User Schedule>  heparin  Injectable 5000 Unit(s) SubCutaneous every 12 hours  influenza   Vaccine 0.5 milliLiter(s) IntraMuscular once  insulin glargine Injectable (LANTUS) 10 Unit(s) SubCutaneous at bedtime  insulin lispro (HumaLOG) corrective regimen sliding scale   SubCutaneous three times a day before meals  insulin lispro (HumaLOG) corrective regimen sliding scale   SubCutaneous at bedtime  pantoprazole    Tablet 40 milliGRAM(s) Oral before breakfast  sacubitril 97 mG/valsartan 103 mG 1 Tablet(s) Oral two times a day  tiotropium 18 MICROgram(s) Capsule 1 Capsule(s) Inhalation daily    MEDICATIONS  (PRN):  ALBUTerol/ipratropium for Nebulization 3 milliLiter(s) Nebulizer every 6 hours PRN Shortness of Breath and/or Wheezing  dextrose 40% Gel 15 Gram(s) Oral once PRN Blood Glucose LESS THAN 70 milliGRAM(s)/deciliter  docusate sodium 100 milliGRAM(s) Oral two times a day PRN Constipation  glucagon  Injectable 1 milliGRAM(s) IntraMuscular once PRN Glucose LESS THAN 70 milligrams/deciliter  senna 2 Tablet(s) Oral at bedtime PRN Constipation      Allergies    No Known Allergies          Vital Signs Last 24 Hrs  T(C): 37.2 (24 Oct 2018 00:23), Max: 37.2 (24 Oct 2018 00:23)  T(F): 98.9 (24 Oct 2018 00:23), Max: 98.9 (24 Oct 2018 00:23)  HR: 70 (24 Oct 2018 04:37) (70 - 75)  BP: 142/77 (24 Oct 2018 04:37) (130/69 - 153/85)  BP(mean): --  RR: 29 (24 Oct 2018 04:00) (18 - 29)  SpO2: 100% (24 Oct 2018 04:00) (96% - 100%)    PHYSICAL EXAM:  GENERAL: Comfortable  NECK: no JVD  NERVOUS SYSTEM: AAO x 3; non focal  CHEST/LUNG: Scant basilar rales  HEART: No rub  ABDOMEN: soft, not tender, +BS  EXTREMITIES: L AV access patent; LE edema improved  SKIN: no rash    LABS:                        8.4    4.7   )-----------( 408      ( 24 Oct 2018 08:11 )             28.6     Hemoglobin: 8.5 g/dL (10.23.18 @ 07:14)    % Saturation, Iron: 14 % (10.18.18 @ 07:33)    Ferritin, Serum: 1022 ng/mL (10.17.18 @ 06:10)        10-24    130<L>  |  91<L>  |  39.0<H>  ----------------------------<  102  5.5<H>   |  26.0  |  4.71<H>    Ca    8.9      24 Oct 2018 08:11  Phos  3.5     10-24  Mg     1.9     10-24          Magnesium, Serum: 1.9 mg/dL (10-24 @ 08:11)  Phosphorus Level, Serum: 3.5 mg/dL (10-24 @ 08:11)      RADIOLOGY & ADDITIONAL TESTS:  < from: Xray Chest 1 View- PORTABLE-Routine (10.21.18 @ 09:40) >     EXAM:  XR CHEST PORTABLE ROUTINE 1V                          PROCEDURE DATE:  10/21/2018          INTERPRETATION:  History: Follow-up infiltrates.    Frontal chest.    Comparison: Earlier study performed same day at 1:24 AM and prior study   10/19/2018.    Pulmonary vascular congestion. No significant interval change moderate to   large right pleural effusion. Associated atelectasis and/or   infiltrate/lung consolidation not excluded. Unchanged patchy and hazy   opacities left mid/lower lung; effusion with overlying atelectasis and/or   infiltrate. Difficult to adequately assess heart size.    Impression:    No significant change pulmonary vascular congestion, moderate to large   right pleural effusion and underlying atelectasis and/or infiltrates not   excluded.    < end of copied text >

## 2018-10-24 NOTE — PROGRESS NOTE ADULT - SUBJECTIVE AND OBJECTIVE BOX
CC: SOB     HPI:  58F presented with dyspnea and cough productive of phlegm with evidence of blood. Chest Xray noted an unchanged moderate right-sided pleural effusion, improved bilateral lower lobe infiltrates. CT of the chest noted extensive patchy bilateral centrilobular ground glass airspace disease and mild scattered interlobular septal thickening, complete passive atelectasis of the right lower lobe, resolution of right upper and middle lobe consolidated infiltrates, interval development of mild right middle lobe and left lingular parenchymal scarring with associated mild-moderate traction bronchiectasis, moderate right and small left pleural effusions, moderate-severe cardiomegaly, interval development of diffuse moderate subcutaneous edema. The patient was seen by Cardiology in consultation for heart failure. The patient was seen by Hematology in consultation for anemia and packed red blood cells were transfused.     INTERVAL HPI/OVERNIGHT EVENTS: Patient seen and examined with  at bedside.  Patient reports feeling better.  Patient denies any headache, dizziness, SOB, CP, abdominal pain, nausea, vomiting.  Other ROS reviewed and are negative.    Vital Signs Last 24 Hrs  T(C): 36.8 (24 Oct 2018 12:01), Max: 37.2 (24 Oct 2018 00:23)  T(F): 98.3 (24 Oct 2018 12:01), Max: 98.9 (24 Oct 2018 00:23)  HR: 59 (24 Oct 2018 12:01) (59 - 75)  BP: 129/74 (24 Oct 2018 12:01) (125/69 - 153/85)  BP(mean): --  RR: 18 (24 Oct 2018 12:01) (18 - 29)  SpO2: 94% (24 Oct 2018 12:01) (94% - 100%)  I&O's Detail    PHYSICAL EXAM:  GENERAL: NAD  HEAD:  Atraumatic, Normocephalic  NECK: Supple, No JVD, Normal thyroid  NERVOUS SYSTEM:  Alert & Oriented X3, Good concentration; Motor Strength 5/5 B/L upper and lower extremities  CHEST/LUNG: Clear to auscultation bilaterally; No rales, rhonchi, wheezing, or rubs  HEART: Regular rate and rhythm; No murmurs, rubs, or gallops  ABDOMEN: Soft, Nontender, Nondistended; Bowel sounds present  EXTREMITIES:  2+ Peripheral Pulses, No clubbing, cyanosis, or edema                              8.4    4.7   )-----------( 408      ( 24 Oct 2018 08:11 )             28.6     24 Oct 2018 08:11    130    |  91     |  39.0   ----------------------------<  102    5.5     |  26.0   |  4.71     Ca    8.9        24 Oct 2018 08:11  Phos  3.5       24 Oct 2018 08:11  Mg     1.9       24 Oct 2018 08:11        CAPILLARY BLOOD GLUCOSE  POCT Blood Glucose.: 107 mg/dL (24 Oct 2018 08:14)  POCT Blood Glucose.: 162 mg/dL (23 Oct 2018 21:04)  POCT Blood Glucose.: 199 mg/dL (23 Oct 2018 16:40)          MEDICATIONS  (STANDING):  ALBUTerol    90 MICROgram(s) HFA Inhaler 1 Puff(s) Inhalation every 4 hours  aspirin enteric coated 81 milliGRAM(s) Oral daily  calcitriol   Capsule 0.25 MICROGram(s) Oral daily  calcium carbonate   1250 mG (OsCal) 1 Tablet(s) Oral three times a day  carvedilol 12.5 milliGRAM(s) Oral every 12 hours  cefepime   IVPB 1000 milliGRAM(s) IV Intermittent every 24 hours  chlorhexidine 2% Cloths 1 Application(s) Topical <User Schedule>  dextrose 5%. 1000 milliLiter(s) (50 mL/Hr) IV Continuous <Continuous>  dextrose 50% Injectable 12.5 Gram(s) IV Push once  dextrose 50% Injectable 25 Gram(s) IV Push once  dextrose 50% Injectable 25 Gram(s) IV Push once  epoetin gian Injectable 77739 Unit(s) IV Push <User Schedule>  heparin  Injectable 5000 Unit(s) SubCutaneous every 12 hours  influenza   Vaccine 0.5 milliLiter(s) IntraMuscular once  insulin glargine Injectable (LANTUS) 10 Unit(s) SubCutaneous at bedtime  insulin lispro (HumaLOG) corrective regimen sliding scale   SubCutaneous three times a day before meals  insulin lispro (HumaLOG) corrective regimen sliding scale   SubCutaneous at bedtime  pantoprazole    Tablet 40 milliGRAM(s) Oral before breakfast  sacubitril 97 mG/valsartan 103 mG 1 Tablet(s) Oral two times a day  tiotropium 18 MICROgram(s) Capsule 1 Capsule(s) Inhalation daily    MEDICATIONS  (PRN):  ALBUTerol/ipratropium for Nebulization 3 milliLiter(s) Nebulizer every 6 hours PRN Shortness of Breath and/or Wheezing  dextrose 40% Gel 15 Gram(s) Oral once PRN Blood Glucose LESS THAN 70 milliGRAM(s)/deciliter  docusate sodium 100 milliGRAM(s) Oral two times a day PRN Constipation  glucagon  Injectable 1 milliGRAM(s) IntraMuscular once PRN Glucose LESS THAN 70 milligrams/deciliter  senna 2 Tablet(s) Oral at bedtime PRN Constipation

## 2018-10-24 NOTE — PROGRESS NOTE ADULT - ASSESSMENT
ESRD: HD today  - UF as tolerates    Anemia: +CRF  Fe stores noted==> high Ferritin  - cont MARGI  - trend H/H     RO: Phosphorus Level, Serum: 3.5 mg/dL  - cont CaCO3 ESRD: HD today  - UF as tolerates    Anemia: +CRF  Fe stores noted==> high Ferritin  - cont MARGI  - trend H/H     RO: Phosphorus Level, Serum: 3.5 mg/dL  - cont CaCO3 and calcitriol

## 2018-10-25 ENCOUNTER — APPOINTMENT (OUTPATIENT)
Dept: GASTROENTEROLOGY | Facility: CLINIC | Age: 58
End: 2018-10-25

## 2018-10-26 LAB
CULTURE RESULTS: SIGNIFICANT CHANGE UP
CULTURE RESULTS: SIGNIFICANT CHANGE UP
SPECIMEN SOURCE: SIGNIFICANT CHANGE UP
SPECIMEN SOURCE: SIGNIFICANT CHANGE UP

## 2018-11-06 PROBLEM — I48.0 PAROXYSMAL ATRIAL FIBRILLATION: Chronic | Status: ACTIVE | Noted: 2018-10-16

## 2018-11-06 PROBLEM — I50.22 CHRONIC SYSTOLIC (CONGESTIVE) HEART FAILURE: Chronic | Status: ACTIVE | Noted: 2018-10-16

## 2018-11-06 PROBLEM — I25.10 ATHEROSCLEROTIC HEART DISEASE OF NATIVE CORONARY ARTERY WITHOUT ANGINA PECTORIS: Chronic | Status: ACTIVE | Noted: 2018-10-16

## 2018-11-06 PROBLEM — N18.9 CHRONIC KIDNEY DISEASE, UNSPECIFIED: Chronic | Status: ACTIVE | Noted: 2018-10-16

## 2018-11-17 NOTE — PATIENT PROFILE ADULT. - TEACHING/LEARNING CULTURAL CONSIDERATIONS
IMPRESSION:  Right heel with ischemic ulcer with changes which have improved post repeated debridement.  Cultures positive for VRE    RECOMMENDATIONS:  Daptomycin 1 gm iv  post HD  Unasyn 3 gm iv q24h  Duration 4 more weeks  Monitor CPK x once per week  recall prn please. none

## 2018-11-20 ENCOUNTER — APPOINTMENT (OUTPATIENT)
Dept: CARDIOLOGY | Facility: CLINIC | Age: 58
End: 2018-11-20

## 2018-11-21 ENCOUNTER — INPATIENT (INPATIENT)
Facility: HOSPITAL | Age: 58
LOS: 6 days | Discharge: HOME | DRG: 186 | End: 2018-11-28
Attending: INTERNAL MEDICINE | Admitting: INTERNAL MEDICINE
Payer: MEDICARE

## 2018-11-21 VITALS
WEIGHT: 143.96 LBS | HEART RATE: 77 BPM | DIASTOLIC BLOOD PRESSURE: 67 MMHG | HEIGHT: 65 IN | RESPIRATION RATE: 20 BRPM | SYSTOLIC BLOOD PRESSURE: 121 MMHG | TEMPERATURE: 98 F | OXYGEN SATURATION: 89 %

## 2018-11-21 DIAGNOSIS — N18.6 END STAGE RENAL DISEASE: ICD-10-CM

## 2018-11-21 DIAGNOSIS — I50.9 HEART FAILURE, UNSPECIFIED: ICD-10-CM

## 2018-11-21 DIAGNOSIS — R06.00 DYSPNEA, UNSPECIFIED: ICD-10-CM

## 2018-11-21 DIAGNOSIS — I42.8 OTHER CARDIOMYOPATHIES: ICD-10-CM

## 2018-11-21 DIAGNOSIS — I50.23 ACUTE ON CHRONIC SYSTOLIC (CONGESTIVE) HEART FAILURE: ICD-10-CM

## 2018-11-21 DIAGNOSIS — Z49.01 ENCOUNTER FOR FITTING AND ADJUSTMENT OF EXTRACORPOREAL DIALYSIS CATHETER: Chronic | ICD-10-CM

## 2018-11-21 DIAGNOSIS — E11.8 TYPE 2 DIABETES MELLITUS WITH UNSPECIFIED COMPLICATIONS: ICD-10-CM

## 2018-11-21 DIAGNOSIS — I77.0 ARTERIOVENOUS FISTULA, ACQUIRED: Chronic | ICD-10-CM

## 2018-11-21 LAB
ALBUMIN SERPL ELPH-MCNC: 3.7 G/DL — SIGNIFICANT CHANGE UP (ref 3.3–5.2)
ALP SERPL-CCNC: 103 U/L — SIGNIFICANT CHANGE UP (ref 40–120)
ALT FLD-CCNC: <5 U/L — SIGNIFICANT CHANGE UP
ANION GAP SERPL CALC-SCNC: 13 MMOL/L — SIGNIFICANT CHANGE UP (ref 5–17)
APTT BLD: 29.9 SEC — SIGNIFICANT CHANGE UP (ref 27.5–36.3)
AST SERPL-CCNC: 13 U/L — SIGNIFICANT CHANGE UP
BASOPHILS # BLD AUTO: 0 K/UL — SIGNIFICANT CHANGE UP (ref 0–0.2)
BASOPHILS NFR BLD AUTO: 0.2 % — SIGNIFICANT CHANGE UP (ref 0–2)
BILIRUB SERPL-MCNC: 1.1 MG/DL — SIGNIFICANT CHANGE UP (ref 0.4–2)
BUN SERPL-MCNC: 28 MG/DL — HIGH (ref 8–20)
CALCIUM SERPL-MCNC: 9.3 MG/DL — SIGNIFICANT CHANGE UP (ref 8.6–10.2)
CHLORIDE SERPL-SCNC: 94 MMOL/L — LOW (ref 98–107)
CO2 SERPL-SCNC: 31 MMOL/L — HIGH (ref 22–29)
CREAT SERPL-MCNC: 2.74 MG/DL — HIGH (ref 0.5–1.3)
EOSINOPHIL # BLD AUTO: 0.2 K/UL — SIGNIFICANT CHANGE UP (ref 0–0.5)
EOSINOPHIL NFR BLD AUTO: 3.8 % — SIGNIFICANT CHANGE UP (ref 0–6)
GLUCOSE BLDC GLUCOMTR-MCNC: 131 MG/DL — HIGH (ref 70–99)
GLUCOSE BLDC GLUCOMTR-MCNC: 221 MG/DL — HIGH (ref 70–99)
GLUCOSE BLDC GLUCOMTR-MCNC: 268 MG/DL — HIGH (ref 70–99)
GLUCOSE SERPL-MCNC: 268 MG/DL — HIGH (ref 70–115)
HCT VFR BLD CALC: 27.7 % — LOW (ref 37–47)
HGB BLD-MCNC: 8.1 G/DL — LOW (ref 12–16)
INR BLD: 1.27 RATIO — HIGH (ref 0.88–1.16)
LYMPHOCYTES # BLD AUTO: 0.5 K/UL — LOW (ref 1–4.8)
LYMPHOCYTES # BLD AUTO: 12.7 % — LOW (ref 20–55)
MCHC RBC-ENTMCNC: 28.4 PG — SIGNIFICANT CHANGE UP (ref 27–31)
MCHC RBC-ENTMCNC: 29.2 G/DL — LOW (ref 32–36)
MCV RBC AUTO: 97.2 FL — SIGNIFICANT CHANGE UP (ref 81–99)
MONOCYTES # BLD AUTO: 0.3 K/UL — SIGNIFICANT CHANGE UP (ref 0–0.8)
MONOCYTES NFR BLD AUTO: 7.3 % — SIGNIFICANT CHANGE UP (ref 3–10)
NEUTROPHILS # BLD AUTO: 3.2 K/UL — SIGNIFICANT CHANGE UP (ref 1.8–8)
NEUTROPHILS NFR BLD AUTO: 76 % — HIGH (ref 37–73)
PLATELET # BLD AUTO: 250 K/UL — SIGNIFICANT CHANGE UP (ref 150–400)
POTASSIUM SERPL-MCNC: 3.9 MMOL/L — SIGNIFICANT CHANGE UP (ref 3.5–5.3)
POTASSIUM SERPL-SCNC: 3.9 MMOL/L — SIGNIFICANT CHANGE UP (ref 3.5–5.3)
PROT SERPL-MCNC: 7.8 G/DL — SIGNIFICANT CHANGE UP (ref 6.6–8.7)
PROTHROM AB SERPL-ACNC: 14.7 SEC — HIGH (ref 10–12.9)
RBC # BLD: 2.85 M/UL — LOW (ref 4.4–5.2)
RBC # FLD: 19.2 % — HIGH (ref 11–15.6)
SODIUM SERPL-SCNC: 138 MMOL/L — SIGNIFICANT CHANGE UP (ref 135–145)
WBC # BLD: 4.2 K/UL — LOW (ref 4.8–10.8)
WBC # FLD AUTO: 4.2 K/UL — LOW (ref 4.8–10.8)

## 2018-11-21 PROCEDURE — 99223 1ST HOSP IP/OBS HIGH 75: CPT

## 2018-11-21 PROCEDURE — 93010 ELECTROCARDIOGRAM REPORT: CPT

## 2018-11-21 PROCEDURE — 71250 CT THORAX DX C-: CPT | Mod: 26

## 2018-11-21 PROCEDURE — 99285 EMERGENCY DEPT VISIT HI MDM: CPT

## 2018-11-21 PROCEDURE — 99223 1ST HOSP IP/OBS HIGH 75: CPT | Mod: AI

## 2018-11-21 PROCEDURE — 71045 X-RAY EXAM CHEST 1 VIEW: CPT | Mod: 26

## 2018-11-21 RX ORDER — GLUCAGON INJECTION, SOLUTION 0.5 MG/.1ML
1 INJECTION, SOLUTION SUBCUTANEOUS ONCE
Qty: 0 | Refills: 0 | Status: DISCONTINUED | OUTPATIENT
Start: 2018-11-21 | End: 2018-11-28

## 2018-11-21 RX ORDER — DEXTROSE 50 % IN WATER 50 %
15 SYRINGE (ML) INTRAVENOUS ONCE
Qty: 0 | Refills: 0 | Status: DISCONTINUED | OUTPATIENT
Start: 2018-11-21 | End: 2018-11-28

## 2018-11-21 RX ORDER — DEXTROSE 50 % IN WATER 50 %
25 SYRINGE (ML) INTRAVENOUS ONCE
Qty: 0 | Refills: 0 | Status: DISCONTINUED | OUTPATIENT
Start: 2018-11-21 | End: 2018-11-28

## 2018-11-21 RX ORDER — CALCITRIOL 0.5 UG/1
0.25 CAPSULE ORAL DAILY
Qty: 0 | Refills: 0 | Status: DISCONTINUED | OUTPATIENT
Start: 2018-11-21 | End: 2018-11-28

## 2018-11-21 RX ORDER — INSULIN LISPRO 100/ML
VIAL (ML) SUBCUTANEOUS
Qty: 0 | Refills: 0 | Status: DISCONTINUED | OUTPATIENT
Start: 2018-11-21 | End: 2018-11-28

## 2018-11-21 RX ORDER — SACUBITRIL AND VALSARTAN 24; 26 MG/1; MG/1
1 TABLET, FILM COATED ORAL
Qty: 0 | Refills: 0 | Status: DISCONTINUED | OUTPATIENT
Start: 2018-11-21 | End: 2018-11-28

## 2018-11-21 RX ORDER — FERROUS SULFATE 325(65) MG
325 TABLET ORAL DAILY
Qty: 0 | Refills: 0 | Status: DISCONTINUED | OUTPATIENT
Start: 2018-11-21 | End: 2018-11-28

## 2018-11-21 RX ORDER — SODIUM CHLORIDE 9 MG/ML
3 INJECTION INTRAMUSCULAR; INTRAVENOUS; SUBCUTANEOUS ONCE
Qty: 0 | Refills: 0 | Status: COMPLETED | OUTPATIENT
Start: 2018-11-21 | End: 2018-11-21

## 2018-11-21 RX ORDER — INSULIN GLARGINE 100 [IU]/ML
10 INJECTION, SOLUTION SUBCUTANEOUS AT BEDTIME
Qty: 0 | Refills: 0 | Status: DISCONTINUED | OUTPATIENT
Start: 2018-11-21 | End: 2018-11-26

## 2018-11-21 RX ORDER — CARVEDILOL PHOSPHATE 80 MG/1
12.5 CAPSULE, EXTENDED RELEASE ORAL EVERY 12 HOURS
Qty: 0 | Refills: 0 | Status: DISCONTINUED | OUTPATIENT
Start: 2018-11-21 | End: 2018-11-28

## 2018-11-21 RX ORDER — CALCIUM CARBONATE 500(1250)
1 TABLET ORAL THREE TIMES A DAY
Qty: 0 | Refills: 0 | Status: DISCONTINUED | OUTPATIENT
Start: 2018-11-21 | End: 2018-11-28

## 2018-11-21 RX ORDER — SODIUM CHLORIDE 9 MG/ML
1000 INJECTION, SOLUTION INTRAVENOUS
Qty: 0 | Refills: 0 | Status: DISCONTINUED | OUTPATIENT
Start: 2018-11-21 | End: 2018-11-28

## 2018-11-21 RX ORDER — DEXTROSE 50 % IN WATER 50 %
12.5 SYRINGE (ML) INTRAVENOUS ONCE
Qty: 0 | Refills: 0 | Status: DISCONTINUED | OUTPATIENT
Start: 2018-11-21 | End: 2018-11-28

## 2018-11-21 RX ORDER — ASPIRIN/CALCIUM CARB/MAGNESIUM 324 MG
81 TABLET ORAL DAILY
Qty: 0 | Refills: 0 | Status: DISCONTINUED | OUTPATIENT
Start: 2018-11-21 | End: 2018-11-28

## 2018-11-21 RX ADMIN — Medication 1 TABLET(S): at 23:01

## 2018-11-21 RX ADMIN — SACUBITRIL AND VALSARTAN 1 TABLET(S): 24; 26 TABLET, FILM COATED ORAL at 18:43

## 2018-11-21 RX ADMIN — SODIUM CHLORIDE 3 MILLILITER(S): 9 INJECTION INTRAMUSCULAR; INTRAVENOUS; SUBCUTANEOUS at 10:24

## 2018-11-21 RX ADMIN — INSULIN GLARGINE 10 UNIT(S): 100 INJECTION, SOLUTION SUBCUTANEOUS at 23:00

## 2018-11-21 RX ADMIN — CARVEDILOL PHOSPHATE 12.5 MILLIGRAM(S): 80 CAPSULE, EXTENDED RELEASE ORAL at 18:43

## 2018-11-21 NOTE — H&P ADULT - ASSESSMENT
57 Y/O F w/ PMHx of ESRD (on HD MWF), NICM w HFrEF, Dm2 on insulin, pafib no a/c due to GI bleed, pericardial effusion, chronic right pleural effusion presents for shortness of breath (progressive, no fevers, cough). Found to have large right pleural effusion as outpatient and sent to ER for further management by PMD.

## 2018-11-21 NOTE — ED PROVIDER NOTE - CHPI ED SYMPTOMS NEG
no back pain/no dizziness/no fever/no vomiting/no pain/no chills/no decreased eating/drinking/no headache/no loss of consciousness/no nausea

## 2018-11-21 NOTE — CONSULT NOTE ADULT - SUBJECTIVE AND OBJECTIVE BOX
[facility]                                                        Office: [address] [place] [address]                                                       Telephone: [phone]. Fax:[phone]    Patient is a 58y old  Female who presents with a chief complaint of shortness of breath (2018 14:18)      HPI: 57 y/o  female with ESRD on HD M/W/F, HFrEF LVEF 20-25% from echo in 10/2018, NICM (non-obstructive CAD on cath 3/2018), insulin dependent diabetes mellitus, hypertension, hyperlipidemia, paroxysmal atrial fibrillation (not on AC due to hx of GI bleed, has not followed up in the office regarding watchman referral), uremic pericarditis s/p pericardiocentesis, right sided pleural effusion s/p thoracentesis 2018, hx of dietary indiscretions, presents with worsening shortness of breath at home.  Found with large right sided pleural effusion by PCP. Compliant with HD sessions and medications.  No chest pain, dizziness.  Reports orthopnea and cough in supine position.       PAST MEDICAL & SURGICAL HISTORY:  non-obstructive CAD  Paroxysmal atrial fibrillation  Anemia due to chronic kidney disease, unspecified CKD stage  Chronic systolic congestive heart failure  Pleural effusion  Pericardial effusion  End stage renal disease on dialysis  HLD (hyperlipidemia)  HTN (hypertension)  DM (diabetes mellitus)  A-V fistula  Encounter for dialysis catheter care      Allergies    No Known Allergies    Intolerances        Home Medications:  calcitriol 0.25 mcg oral capsule: 1 cap(s) orally once a day (2018 09:08)  calcium carbonate 1250 mg (500 mg elemental calcium) oral tablet: 1 tab(s) orally 3 times a day (2018 09:08)  Lantus 100 units/mL subcutaneous solution: 10 unit(s) subcutaneous once a day (at bedtime) (16 Oct 2018 21:00)  pantoprazole 40 mg oral delayed release tablet: 1 tab(s) orally once a day (before a meal) (16 Oct 2018 21:00)      MEDICATIONS  (STANDING):  aspirin enteric coated 81 milliGRAM(s) Oral daily  calcitriol   Capsule 0.25 MICROGram(s) Oral daily  calcium carbonate   1250 mG (OsCal) 1 Tablet(s) Oral three times a day  carvedilol 12.5 milliGRAM(s) Oral every 12 hours  dextrose 5%. 1000 milliLiter(s) (50 mL/Hr) IV Continuous <Continuous>  dextrose 50% Injectable 12.5 Gram(s) IV Push once  dextrose 50% Injectable 25 Gram(s) IV Push once  dextrose 50% Injectable 25 Gram(s) IV Push once  ferrous    sulfate 325 milliGRAM(s) Oral daily  insulin glargine Injectable (LANTUS) 10 Unit(s) SubCutaneous at bedtime  insulin lispro (HumaLOG) corrective regimen sliding scale   SubCutaneous three times a day before meals  sacubitril 97 mG/valsartan 103 mG 1 Tablet(s) Oral two times a day    MEDICATIONS  (PRN):  dextrose 40% Gel 15 Gram(s) Oral once PRN Blood Glucose LESS THAN 70 milliGRAM(s)/deciliter  glucagon  Injectable 1 milliGRAM(s) IntraMuscular once PRN Glucose LESS THAN 70 milligrams/deciliter      FAMILY HISTORY:  Family history of kidney disease in brother (Sibling)      SOCIAL HISTORY: No tobacco/ No etoh/ No illicit drug use    PREVIOUS DIAGNOSTIC TESTING:      ECHO FINDINGS: < from: TTE Echo Limited or F/U (10.18.18 @ 22:33) >  Summary:   1. Left ventricular ejection fraction, by visual estimation, is 20 to   25%.   2. Severely decreased global left ventricular systolic function.   3. Thickening of the anterior and posterior mitral valve leaflets.   4. Sclerotic aortic valve with normal opening; very small strandlike   echo denisties attached to the valve suggest lambl's excrescence.   5. Trivial pericardial effusion.    [id number] Wesly Garcia MD, Electronically signed on 10/19/2018 at 7:46:02   PM       < end of copied text >    STRESS FINDINGS:   CATHETERIZATION FINDINGS: < from: Cardiac Cath Lab - Adult (18 @ 13:53) >  VENTRICLES: There were no left ventricular global or regional wall motion  abnormalities. Global left ventricular function was normal. EF calculated  by contrast ventriculography was 55 %.  VALVES: AORTIC VALVE: No significant aortic valve gradient. MITRAL VALVE:  The mitral valve exhibited trivial regurgitation (less than 1+).  CORONARY VESSELS: The coronary circulation is right dominant.  LM:   --  LM: Normal. The vessel was normal sized, not calcified, and not  tortuous. Angiography showed no evidence of disease.  LAD:   --  LAD: Normal. The vessel was normal sized, not calcified, and not  tortuous. Angiography showed minor luminal irregularities with no flow  limiting lesions.  CX:   --  Circumflex: Normal. The vessel was normal sized, not calcified,  and excessively tortuous.Angiography showed no evidence of disease.  RCA:   --  RCA: Normal. The vessel was normal sized, not calcified, and  moderately tortuous. Angiography showed no evidence of disease.  COMPLICATIONS: There were no complications. No complications occurred  during the cath lab visit.  DIAGNOSTIC IMPRESSIONS: There is mild irregularity of the coronary anatomy.  Left ventricular function is normal (LVEF=55%).    < end of copied text >      REVIEW OF SYSTEMS:  CONSTITUTIONAL: [-]fever   [-] weight loss   [-] fatigue  EYES: [-]  eye pain   [-] visual disturbances      [-]  discharge  ENMT:  [-]  difficulty hearing,   [-]  tinnitus   [-] vertigo    [-]  sinus or throat pain  NECK: [-]  pain or stiffness  RESPIRATORY: [+]  cough 	[-] wheezing 	[-]  hemoptysis 		[+]   Shortness of Breath  CARDIOVASCULAR: [-]  chest pain	[-] palpitations		[-]  passing out 		[-] dizziness 	[-]  leg swelling  		[-]  PND 	[-] orthopnea  GASTROINTESTINAL: [-]  abdominal pain		[-]nausea	[-] vomiting	[-]  hematemesis 	[-]  diarrhea  	[-] constipation 		[-]  melena 	[-] hematochezia.  GENITOURINARY: [-] dysuria	[-] frequency	[-] hematuria	[-]  incontinence  NEUROLOGICAL: [-]  headaches		[-] memory loss 	[-]  loss of strength  			[-]  numbness/tingling 	[-]  tremors  SKIN: [-]  itching 	[-] rashes 	[-]  lesions   LYMPH Nodes: [-] enlarged glands  ENDOCRINE: [-] heat or cold intolerance 	[-]   hair loss  MUSCULOSKELETAL: [-] joint pain  [-] joint swelling	[-]  muscle, back, or extremity pain  PSYCHIATRIC: [-]  depression	[-] anxiety	[-] mood swings		[-]  difficulty sleeping   HEME: [-]  easy bruising 	[-]  bleeding   ALLERY AND IMMUNOLOGIC: [-]  hives or eczema	      Vital Signs Last 24 Hrs  T(C): 36.5 (2018 15:10), Max: 36.7 (2018 14:18)  T(F): 97.7 (2018 15:10), Max: 98.1 (2018 14:18)  HR: 68 (2018 15:10) (68 - 77)  BP: 153/82 (2018 15:10) (121/67 - 155/75)  BP(mean): --  RR: 18 (2018 15:10) (18 - 20)  SpO2: 92% (2018 15:10) (89% - 95%)  Daily Height in cm: 165.1 (2018 09:04)    Daily Weight in k.3 (2018 15:10)  I&O's Detail        PHYSICAL EXAM:  Appearance: Normal, well nourished, NAD, appears older than stated age	  HEENT:   Normal oral mucosa, PERRL, EOMI, sclera non-icteric	  Lymphatic: No cervical lymphadenopathy  Cardiovascular: Normal S1 S2, No JVD, No cardiac murmurs, No carotid bruits, No peripheral edema  Respiratory: decreased breath sounds on right mid to base  Psychiatry: A & O x 3, Mood & affect appropriate  Gastrointestinal:  Soft, Non-tender, + BS, no bruits	  Skin: No rashes, No ecchymoses, No cyanosis, Dry  Neurologic: Grossly non-focal with full strength in all four extremities  Extremities: Normal range of motion, No clubbing, cyanosis or edema  Vascular: Peripheral pulses palpable 2+ bilaterally, warm, LUE AVF    INTERPRETATION OF TELEMETRY: n/a    ECG (tracing reviewed by me): SR. LAE, LVH, nonspecific T wave abnormalities    LABS:                        8.1    4.2   )-----------( 250      ( 2018 10:29 )             27.7     11-    138  |  94<L>  |  28.0<H>  ----------------------------<  268<H>  3.9   |  31.0<H>  |  2.74<H>    Ca    9.3      2018 10:29    TPro  7.8  /  Alb  3.7  /  TBili  1.1  /  DBili  x   /  AST  13  /  ALT  <5  /  AlkPhos  103  11-21        PT/INR - ( 2018 10:29 )   PT: 14.7 sec;   INR: 1.27 ratio         PTT - ( 2018 10:29 )  PTT:29.9 sec    BNP    RADIOLOGY & ADDITIONAL STUDIES:  CXR (image reviewed by me): < from: Xray Chest 1 View- PORTABLE-Urgent (18 @ 10:30) >  IMPRESSION: Worsening moderate pulmonary edema with enlarging loculated   right and stable  small left pleural effusion. Right base compressive atelectasis.      < end of copied text >    < from: CT Chest No Cont (18 @ 11:00) >  IMPRESSION:     Worsening moderate pulmonary edema with enlarging loculated right and   stable small left pleural effusion. Right base compressive atelectasis.    < end of copied text >

## 2018-11-21 NOTE — ED ADULT NURSE REASSESSMENT NOTE - NS ED NURSE REASSESS COMMENT FT1
Dr. Aguilar (Renal) at bedside, consent obtained for dialysis. Report handed off to Tracy Jacome RN.

## 2018-11-21 NOTE — ED ADULT NURSE NOTE - OBJECTIVE STATEMENT
Pt care assumed at 1020, presents to ED A&Ox3 c/o shortness of breath and difficulty breathing. Daughter at bedside, reports pt has hx of pleural effusion in September, was hospitalized with B/L chest tubes to drain. Reports they followed up with her PMD yesterday, had CXR done which showed "fluid in my left lung." Diminished breath sounds heard on right lower base. Pt on oxygen at home via nasal cannula 2-3LPM, pt in no apparent distress at this time, respirations even and unlabored. Pt denies any CP or discomfort. IV access established, labs pending, will continue to monitor and reassess. Pt care assumed at 1020, presents to ED A&Ox3 c/o shortness of breath and difficulty breathing. Daughter at bedside, reports pt has hx of pleural effusion in September, was hospitalized with B/L chest tubes to drain. Reports they followed up with her PMD yesterday, had CXR done which showed "fluid in my left lung." Absent breath sounds on right lower base. Pt on oxygen at home via nasal cannula 2-3LPM, pt in no apparent distress at this time, respirations even and unlabored. Pt denies any CP or discomfort. IV access established, labs pending, will continue to monitor and reassess.

## 2018-11-21 NOTE — ED ADULT TRIAGE NOTE - CHIEF COMPLAINT QUOTE
pt sent to ED by PMD for liquid in her left lung. pt uses oxygen at home and hx of liquid in her lung. pt sat 88 in triage

## 2018-11-21 NOTE — CONSULT NOTE ADULT - ASSESSMENT
57 y/o  female with ESRD on HD M/W/F, HFrEF LVEF 20-25% from echo in 10/2018, NICM (non-obstructive CAD on cath 3/2018), insulin dependent diabetes mellitus, hypertension, hyperlipidemia, paroxysmal atrial fibrillation (not on AC due to hx of GI bleed, has not followed up in the office regarding watchman referral), uremic pericarditis s/p pericardiocentesis, right sided pleural effusion s/p thoracentesis 9/2018, hx of dietary indiscretions, presents with worsening shortness of breath at home.  Found with large right sided pleural effusion by PCP. Compliant with HD sessions and medications.  No chest pain, dizziness.  Reports orthopnea and cough in supine position. Imaging showed pulmonary edema, small left pleural effusion, moderate, increasing right sided pleural effusion.      # dyspnea - bilateral pleural effusions + decompensated systolic heart failure, hypervolemic.  Currently getting HD - agree.  If not anuric, would consider adding bumex.  Low salt diet.  Thoracic surgery has been consulted for the pleural effusions.   # NICM, decompensated, LVEF 20-25% - etiology of heart failure likely due to hypertensive heart disease.  Continue carvedilol and entresto at current doses.  LVEF has been severely depressed since August, has been on OMT since then, however missed follow up appointments in the office with me.  Will check TTE this admission.  If LVEF < 35% will refer for ICD for primary prevention. If not anuric, consider adding diuretic.   # hypertension - milldy elevated, should improve with volume removal   # paroxysmal afib - hx of recurrent GI bleed in the past, deemed high bleeding risk.  Has been previously referred for watchman, however has missed appointments in the office.  Would consider performing GERALD this admission to evaluate if LA anatomy amenable to device. Have to discuss with GI regarding safety of temporary AC (45 days) post Watchman implant.   # ESRD - HD per renal.     Thank you for allowing me to participate in care of your patient.   D/W Dr. Waite

## 2018-11-21 NOTE — H&P ADULT - PROBLEM SELECTOR PLAN 2
CT surgery to eval for thoracentesis/chest tube and need for pleurex given recurrent admissions d/w CT surgery--> IR thoracentesis recommended.  likely thora on friday....re evaluate after HD

## 2018-11-21 NOTE — ED ADULT NURSE NOTE - NSIMPLEMENTINTERV_GEN_ALL_ED
Implemented All Universal Safety Interventions:  [place] to call system. Call bell, personal items and telephone within reach. Instruct patient to call for assistance. Room bathroom lighting operational. Non-slip footwear when patient is off stretcher. Physically safe environment: no spills, clutter or unnecessary equipment. Stretcher in lowest position, wheels locked, appropriate side rails in place.

## 2018-11-21 NOTE — CONSULT NOTE ADULT - SUBJECTIVE AND OBJECTIVE BOX
HPI:  57 Y/O F w/ PMHx of ESRD (on HD MWF), NICM w HFrEF, Dm2 on insulin, pafib no a/c due to GI bleed, pericardial effusion, chronic R pleural effusion presents for shortness of breath (progressive, no fevers, cough). Found to have large R pleural effusion as outpatient and sent to ER for further management by PMD.  Patient and daughter at bedside state she follows some fluid and dietary restriction.  Rico Morales at [facility]  ROS +weakness +HAYES denies HA CP N/V/D     PAST MEDICAL & SURGICAL HISTORY:  Coronary artery disease, angina presence unspecified, unspecified vessel or lesion type, unspecified whether native or transplanted heart  Paroxysmal atrial fibrillation  Anemia due to chronic kidney disease, unspecified CKD stage  Chronic systolic congestive heart failure  Pleural effusion  Pericardial effusion  End stage renal disease on dialysis  HLD (hyperlipidemia)  HTN (hypertension)  DM (diabetes mellitus)  A-V fistula  Encounter for dialysis catheter care      FAMILY HISTORY:  Family history of kidney disease in brother (Sibling)  [place]    Social History:Non smoker    MEDICATIONS  (STANDING):  aspirin enteric coated 81 milliGRAM(s) Oral daily  calcitriol   Capsule 0.25 MICROGram(s) Oral daily  calcium carbonate   1250 mG (OsCal) 1 Tablet(s) Oral three times a day  carvedilol 12.5 milliGRAM(s) Oral every 12 hours  dextrose 5%. 1000 milliLiter(s) (50 mL/Hr) IV Continuous <Continuous>  dextrose 50% Injectable 12.5 Gram(s) IV Push once  dextrose 50% Injectable 25 Gram(s) IV Push once  dextrose 50% Injectable 25 Gram(s) IV Push once  ferrous    sulfate 325 milliGRAM(s) Oral daily  insulin glargine Injectable (LANTUS) 10 Unit(s) SubCutaneous at bedtime  insulin lispro (HumaLOG) corrective regimen sliding scale   SubCutaneous three times a day before meals  sacubitril 97 mG/valsartan 103 mG 1 Tablet(s) Oral two times a day    MEDICATIONS  (PRN):  dextrose 40% Gel 15 Gram(s) Oral once PRN Blood Glucose LESS THAN 70 milliGRAM(s)/deciliter  glucagon  Injectable 1 milliGRAM(s) IntraMuscular once PRN Glucose LESS THAN 70 milligrams/deciliter   Meds reviewed    Vital Signs Last 24 Hrs  T(C): 36.6 (21 Nov 2018 09:04), Max: 36.6 (21 Nov 2018 09:04)  T(F): 97.8 (21 Nov 2018 09:04), Max: 97.8 (21 Nov 2018 09:04)  HR: 77 (21 Nov 2018 09:04) (77 - 77)  BP: 121/67 (21 Nov 2018 09:04) (121/67 - 121/67)  BP(mean): --  RR: 20 (21 Nov 2018 09:04) (20 - 20)  SpO2: 89% (21 Nov 2018 09:04) (89% - 89%)  Daily Height in cm: 165.1 (21 Nov 2018 09:04)    Daily     PHYSICAL EXAM:    GENERAL: appears chronically ill, oriented.  HEAD:  NCAT  EYES: EOMI  NECK: Supple, neck  veins full  NERVOUS SYSTEM:  Alert & Oriented X3  CHEST/LUNG: Clear to percussion bilaterally; No rales  HEART: Regular rate and rhythm; No murmur  ABDOMEN: Soft, Nontender, Nondistended; Bowel sounds present  EXTREMITIES:  trace LE edema      LABS:                        8.1    4.2   )-----------( 250      ( 21 Nov 2018 10:29 )             27.7     11-21    138  |  94<L>  |  28.0<H>  ----------------------------<  268<H>  3.9   |  31.0<H>  |  2.74<H>    Ca    9.3      21 Nov 2018 10:29    TPro  7.8  /  Alb  3.7  /  TBili  1.1  /  DBili  x   /  AST  13  /  ALT  <5  /  AlkPhos  103  11-21    PT/INR - ( 21 Nov 2018 10:29 )   PT: 14.7 sec;   INR: 1.27 ratio         PTT - ( 21 Nov 2018 10:29 )  PTT:29.9 sec            RADIOLOGY & ADDITIONAL TESTS:

## 2018-11-21 NOTE — CONSULT NOTE ADULT - ASSESSMENT
ESRD will HD today  Clinically volume  overloaded  R large pleaural effusion- thoracentesis?  Anemia markell check iron studies  - will give epo w HD today  Consent for HD obtained by me    Will follow

## 2018-11-21 NOTE — ED PROVIDER NOTE - CONSTITUTIONAL, MLM
normal... ILL appearing, well nourished, awake, alert, oriented to person, place, time/situation and in no apparent distress.

## 2018-11-21 NOTE — H&P ADULT - HISTORY OF PRESENT ILLNESS
57 Y/O F w/ PMHx of ESRD (on HD MWF), NICM w HFrEF, Dm2 on insulin, pafib no a/c due to GI bleed, pericardial effusion, chronic right pleural effusion presents for shortness of breath (progressive, no fevers, cough). Found to have large right pleural effusion as outpatient and sent to ER for further management by PMD.  Patient and daughter at bedside state she follows some fluid and dietary restriction.

## 2018-11-21 NOTE — ED PROVIDER NOTE - OBJECTIVE STATEMENT
57 YO FEMALE WITH C/C SOB. SUDDEN  ONSET OVER THE PAST WEEK OR SO. FAILY STATES SHE TAKES MORE FLUID THAN SHE IS SUPPOSED TO. PT IS A DIALYSIS PT ON FLUID RESTRICTION. FAMILY STATES PT IS SOB AND HAVING TROUBLE SLEEPING B/C SHE FEELS LIKE SHE CANNOT BREATHE. PT IS REPORTEDLY COMPLIANT WITH DIALYSIS. NO FEVER NO CP. + RECETN DRAINAGE OF EFFUSION LUNG. IMPROVED FOR SHORT PERIOD OF TIME BUT THEN REOCCURRED. PMD PADMINI AND RENAL IS LEIF ( PT DID NOT KNOW NAME OF HER RENAL DOCTOR)  MED HX Anemia due to chronic kidney disease, unspecified CKD stage    Chronic systolic congestive heart failure    Coronary artery disease, angina presence unspecified, unspecified vessel or lesion type, unspecified whether native or transplanted heart    DM (diabetes mellitus)    End stage renal disease on dialysis    HLD (hyperlipidemia)    HTN (hypertension)    Paroxysmal atrial fibrillation    Pericardial effusion    Pleural effusion

## 2018-11-22 LAB
GLUCOSE BLDC GLUCOMTR-MCNC: 126 MG/DL — HIGH (ref 70–99)
GLUCOSE BLDC GLUCOMTR-MCNC: 153 MG/DL — HIGH (ref 70–99)
GLUCOSE BLDC GLUCOMTR-MCNC: 157 MG/DL — HIGH (ref 70–99)
GLUCOSE BLDC GLUCOMTR-MCNC: 237 MG/DL — HIGH (ref 70–99)

## 2018-11-22 PROCEDURE — 99233 SBSQ HOSP IP/OBS HIGH 50: CPT

## 2018-11-22 PROCEDURE — 99232 SBSQ HOSP IP/OBS MODERATE 35: CPT

## 2018-11-22 RX ORDER — ACETAMINOPHEN 500 MG
650 TABLET ORAL EVERY 6 HOURS
Qty: 0 | Refills: 0 | Status: DISCONTINUED | OUTPATIENT
Start: 2018-11-22 | End: 2018-11-28

## 2018-11-22 RX ORDER — IPRATROPIUM/ALBUTEROL SULFATE 18-103MCG
3 AEROSOL WITH ADAPTER (GRAM) INHALATION EVERY 6 HOURS
Qty: 0 | Refills: 0 | Status: DISCONTINUED | OUTPATIENT
Start: 2018-11-22 | End: 2018-11-28

## 2018-11-22 RX ADMIN — Medication 2: at 17:30

## 2018-11-22 RX ADMIN — CALCITRIOL 0.25 MICROGRAM(S): 0.5 CAPSULE ORAL at 12:11

## 2018-11-22 RX ADMIN — Medication 4: at 12:35

## 2018-11-22 RX ADMIN — SACUBITRIL AND VALSARTAN 1 TABLET(S): 24; 26 TABLET, FILM COATED ORAL at 17:30

## 2018-11-22 RX ADMIN — Medication 81 MILLIGRAM(S): at 12:12

## 2018-11-22 RX ADMIN — Medication 1 TABLET(S): at 22:22

## 2018-11-22 RX ADMIN — SACUBITRIL AND VALSARTAN 1 TABLET(S): 24; 26 TABLET, FILM COATED ORAL at 05:12

## 2018-11-22 RX ADMIN — CARVEDILOL PHOSPHATE 12.5 MILLIGRAM(S): 80 CAPSULE, EXTENDED RELEASE ORAL at 17:30

## 2018-11-22 RX ADMIN — Medication 325 MILLIGRAM(S): at 12:12

## 2018-11-22 RX ADMIN — Medication 650 MILLIGRAM(S): at 15:25

## 2018-11-22 RX ADMIN — INSULIN GLARGINE 10 UNIT(S): 100 INJECTION, SOLUTION SUBCUTANEOUS at 22:22

## 2018-11-22 RX ADMIN — Medication 650 MILLIGRAM(S): at 17:18

## 2018-11-22 RX ADMIN — CARVEDILOL PHOSPHATE 12.5 MILLIGRAM(S): 80 CAPSULE, EXTENDED RELEASE ORAL at 05:12

## 2018-11-22 RX ADMIN — Medication 1 TABLET(S): at 05:12

## 2018-11-22 RX ADMIN — Medication 1 TABLET(S): at 15:15

## 2018-11-22 NOTE — PROGRESS NOTE ADULT - ASSESSMENT
59 y/o  female with ESRD on HD M/W/F, HFrEF LVEF 20-25% from echo in 10/2018, NICM (non-obstructive CAD on cath 3/2018), insulin dependent diabetes mellitus, hypertension, hyperlipidemia, paroxysmal atrial fibrillation (not on AC due to hx of GI bleed, has not followed up in the office regarding watchman referral), uremic pericarditis s/p pericardiocentesis, right sided pleural effusion s/p thoracentesis 9/2018, hx of dietary indiscretions, presents with worsening shortness of breath at home.  Found with large right sided pleural effusion by PCP. Compliant with HD sessions and medications.  No chest pain, dizziness.  Reports orthopnea and cough in supine position. Imaging showed pulmonary edema, small left pleural effusion, moderate, increasing right sided pleural effusion.        Dyspnea - bilateral pleural effusions + decompensated systolic heart failure, hypervolemic: Improved breatghing  Volume control with HD - agree.   Thoracic surgery has been consulted for the pleural effusions.       NICM, decompensated, LVEF 20-25%   Continue carvedilol and entresto at current doses.   Follow Up TTE Pending    PAF  hx of recurrent GI bleed in the past, deemed high bleeding risk.    Has been previously referred for watchman, however has missed appointments in the office.   Have to discuss with GI regarding safety of temporary AC (45 days) post Watchman implant.

## 2018-11-22 NOTE — PROGRESS NOTE ADULT - ASSESSMENT
ESRD: + R pleural effusion  - HD as per schedule  - UF as tolerates  - thoracentesis    Anemia: +CRF  - check Fe stores  - add MARGI to regimen with next HD    RO: cont CaCO3 and rocaltrol

## 2018-11-23 ENCOUNTER — RESULT REVIEW (OUTPATIENT)
Age: 58
End: 2018-11-23

## 2018-11-23 LAB
ALBUMIN FLD-MCNC: 1.7 G/DL — SIGNIFICANT CHANGE UP
ANION GAP SERPL CALC-SCNC: 15 MMOL/L — SIGNIFICANT CHANGE UP (ref 5–17)
B PERT IGG+IGM PNL SER: CLEAR — SIGNIFICANT CHANGE UP
BUN SERPL-MCNC: 41 MG/DL — HIGH (ref 8–20)
CALCIUM SERPL-MCNC: 9.7 MG/DL — SIGNIFICANT CHANGE UP (ref 8.6–10.2)
CHLORIDE SERPL-SCNC: 94 MMOL/L — LOW (ref 98–107)
CO2 SERPL-SCNC: 29 MMOL/L — SIGNIFICANT CHANGE UP (ref 22–29)
COLOR FLD: YELLOW
CREAT SERPL-MCNC: 3.85 MG/DL — HIGH (ref 0.5–1.3)
FERRITIN SERPL-MCNC: 571 NG/ML — HIGH (ref 15–150)
FLUID INTAKE SUBSTANCE CLASS: SIGNIFICANT CHANGE UP
FLUID SEGMENTED GRANULOCYTES: 19 % — SIGNIFICANT CHANGE UP
GLUCOSE BLDC GLUCOMTR-MCNC: 149 MG/DL — HIGH (ref 70–99)
GLUCOSE BLDC GLUCOMTR-MCNC: 151 MG/DL — HIGH (ref 70–99)
GLUCOSE BLDC GLUCOMTR-MCNC: 264 MG/DL — HIGH (ref 70–99)
GLUCOSE BLDC GLUCOMTR-MCNC: 85 MG/DL — SIGNIFICANT CHANGE UP (ref 70–99)
GLUCOSE FLD-MCNC: 130 MG/DL — SIGNIFICANT CHANGE UP
GLUCOSE SERPL-MCNC: 65 MG/DL — LOW (ref 70–115)
GRAM STN FLD: SIGNIFICANT CHANGE UP
HCT VFR BLD CALC: 26.7 % — LOW (ref 37–47)
HGB BLD-MCNC: 7.9 G/DL — LOW (ref 12–16)
IRON SATN MFR SERPL: 20 % — SIGNIFICANT CHANGE UP (ref 14–50)
IRON SATN MFR SERPL: 44 UG/DL — SIGNIFICANT CHANGE UP (ref 37–145)
LDH SERPL L TO P-CCNC: 110 U/L — SIGNIFICANT CHANGE UP
LYMPHOCYTES # FLD: 55 % — SIGNIFICANT CHANGE UP
MCHC RBC-ENTMCNC: 28.6 PG — SIGNIFICANT CHANGE UP (ref 27–31)
MCHC RBC-ENTMCNC: 29.6 G/DL — LOW (ref 32–36)
MCV RBC AUTO: 96.7 FL — SIGNIFICANT CHANGE UP (ref 81–99)
MONOS+MACROS # FLD: 26 % — SIGNIFICANT CHANGE UP
PH FLD: 8 — SIGNIFICANT CHANGE UP
PHOSPHATE SERPL-MCNC: 3.6 MG/DL — SIGNIFICANT CHANGE UP (ref 2.4–4.7)
PLATELET # BLD AUTO: 274 K/UL — SIGNIFICANT CHANGE UP (ref 150–400)
POTASSIUM SERPL-MCNC: 4.6 MMOL/L — SIGNIFICANT CHANGE UP (ref 3.5–5.3)
POTASSIUM SERPL-SCNC: 4.6 MMOL/L — SIGNIFICANT CHANGE UP (ref 3.5–5.3)
PROT FLD-MCNC: 3.6 G/DL — SIGNIFICANT CHANGE UP
RBC # BLD: 2.76 M/UL — LOW (ref 4.4–5.2)
RBC # FLD: 18.3 % — HIGH (ref 11–15.6)
RCV VOL RI: 17 /UL — HIGH (ref 0–5)
SODIUM SERPL-SCNC: 138 MMOL/L — SIGNIFICANT CHANGE UP (ref 135–145)
SPECIMEN SOURCE FLD: SIGNIFICANT CHANGE UP
SPECIMEN SOURCE: SIGNIFICANT CHANGE UP
TIBC SERPL-MCNC: 217 UG/DL — LOW (ref 220–430)
TOTAL NUCLEATED CELL COUNT, BODY FLUID: 55 /UL — HIGH (ref 0–5)
TRANSFERRIN SERPL-MCNC: 152 MG/DL — LOW (ref 192–382)
TUBE TYPE: SIGNIFICANT CHANGE UP
WBC # BLD: 4.4 K/UL — LOW (ref 4.8–10.8)
WBC # FLD AUTO: 4.4 K/UL — LOW (ref 4.8–10.8)

## 2018-11-23 PROCEDURE — 32555 ASPIRATE PLEURA W/ IMAGING: CPT | Mod: RT

## 2018-11-23 PROCEDURE — 88112 CYTOPATH CELL ENHANCE TECH: CPT | Mod: 26

## 2018-11-23 PROCEDURE — 99232 SBSQ HOSP IP/OBS MODERATE 35: CPT

## 2018-11-23 PROCEDURE — 71045 X-RAY EXAM CHEST 1 VIEW: CPT | Mod: 26

## 2018-11-23 PROCEDURE — 99232 SBSQ HOSP IP/OBS MODERATE 35: CPT | Mod: GC

## 2018-11-23 PROCEDURE — 88305 TISSUE EXAM BY PATHOLOGIST: CPT | Mod: 26

## 2018-11-23 RX ORDER — ERYTHROPOIETIN 10000 [IU]/ML
10000 INJECTION, SOLUTION INTRAVENOUS; SUBCUTANEOUS
Qty: 0 | Refills: 0 | Status: DISCONTINUED | OUTPATIENT
Start: 2018-11-23 | End: 2018-11-28

## 2018-11-23 RX ADMIN — Medication 1 TABLET(S): at 21:54

## 2018-11-23 RX ADMIN — CARVEDILOL PHOSPHATE 12.5 MILLIGRAM(S): 80 CAPSULE, EXTENDED RELEASE ORAL at 05:27

## 2018-11-23 RX ADMIN — Medication 1 TABLET(S): at 14:36

## 2018-11-23 RX ADMIN — SACUBITRIL AND VALSARTAN 1 TABLET(S): 24; 26 TABLET, FILM COATED ORAL at 05:27

## 2018-11-23 RX ADMIN — CALCITRIOL 0.25 MICROGRAM(S): 0.5 CAPSULE ORAL at 12:30

## 2018-11-23 RX ADMIN — Medication 81 MILLIGRAM(S): at 12:30

## 2018-11-23 RX ADMIN — Medication 325 MILLIGRAM(S): at 12:30

## 2018-11-23 RX ADMIN — INSULIN GLARGINE 10 UNIT(S): 100 INJECTION, SOLUTION SUBCUTANEOUS at 21:54

## 2018-11-23 RX ADMIN — Medication 2: at 12:29

## 2018-11-23 RX ADMIN — Medication 1 TABLET(S): at 05:27

## 2018-11-23 RX ADMIN — CARVEDILOL PHOSPHATE 12.5 MILLIGRAM(S): 80 CAPSULE, EXTENDED RELEASE ORAL at 17:33

## 2018-11-23 RX ADMIN — SACUBITRIL AND VALSARTAN 1 TABLET(S): 24; 26 TABLET, FILM COATED ORAL at 17:33

## 2018-11-23 NOTE — DIETITIAN INITIAL EVALUATION ADULT. - PERTINENT LABORATORY DATA
11-23 Na138 mmol/L Glu 65 mg/dL<L> K+ 4.6 mmol/L Cr  3.85 mg/dL<H> BUN 41.0 mg/dL<H> Phos 3.6 mg/dL Alb n/a   PAB n/a

## 2018-11-23 NOTE — DIETITIAN INITIAL EVALUATION ADULT. - NS FNS SUPPLEMENTS
once daily to maximize nutrition status and provide an additional 425 kcal, 19.1g protein. Recommend Nephro-Rachel MVI daily/Nepro with Carb Steady®

## 2018-11-23 NOTE — BRIEF OPERATIVE NOTE - PROCEDURE
<<-----Click on this checkbox to enter Procedure Right thoracentesis with ultrasound guidance by radiologist  11/23/2018    Active  [id number]

## 2018-11-23 NOTE — PROGRESS NOTE ADULT - ASSESSMENT
Assessment and Plan:   57 Y/O F w/ PMHx of ESRD (on HD MWF), NICM w HFrEF, Dm2 on insulin, pafib no a/c due to GI bleed, pericardial effusion, chronic right pleural effusion presents for shortness of breath (progressive, no fevers, cough). Found to have large right pleural effusion as outpatient and sent to ER for further management by PMD. Underwent IR guided chest tube placement. Needs PT evaluation.     Problem/Plan - 1:  ·  Problem: Acute on chronic systolic heart failure.  Plan: cardiology following  echo  c/w current meds.      Problem/Plan - 2:  ·  Problem: Pleural effusion due to congestive heart failure.  Plan:   went for IR guided Thoracocentesis today, f/u pleural fluid results  titrate off oxygen as needed     Problem/Plan - 3:  ·  Problem: End stage renal disease on dialysis.  Plan: HD per renal.      Problem/Plan - 4:  ·  Problem: Anemia due to chronic kidney disease, on chronic dialysis.  Plan: ferrous sulfate  epogen.      Problem/Plan - 5:  ·  Problem: Type 2 diabetes mellitus with complication, with long-term current use of insulin.  Plan: lantus, raiss.      Problem/Plan - 6:  Problem: NICM (nonischemic cardiomyopathy). Plan: aspirin/entresto/coreg. Assessment and Plan:   57 Y/O F w/ PMHx of ESRD (on HD MWF), NICM w HFrEF, Dm2 on insulin, pafib no a/c due to GI bleed, pericardial effusion, chronic right pleural effusion presents for shortness of breath (progressive, no fevers, cough). Found to have large right pleural effusion as outpatient and sent to ER for further management by PMD. Underwent IR guided chest tube placement. Needs PT evaluation.     Problem/Plan - 1:  ·  Problem: Acute on chronic systolic heart failure.  Plan: cardiology following  echo  c/w current meds.      Problem/Plan - 2:  ·  Problem: Pleural effusion due to congestive heart failure.  Plan:   went for IR guided Thoracocentesis today, f/u pleural fluid results  titrate off oxygen as needed  repeat CXR after thoracocentesis showed improvement in pleural effusion.     Problem/Plan - 3:  ·  Problem: End stage renal disease on dialysis.  Plan: HD per renal.      Problem/Plan - 4:  ·  Problem: Anemia due to chronic kidney disease, on chronic dialysis.  Plan: ferrous sulfate  epogen.      Problem/Plan - 5:  ·  Problem: Type 2 diabetes mellitus with complication, with long-term current use of insulin.  Plan: lantus, raiss.      Problem/Plan - 6:  Problem: NICM (nonischemic cardiomyopathy). Plan: aspirin/entresto/coreg.

## 2018-11-23 NOTE — DIETITIAN INITIAL EVALUATION ADULT. - OTHER INFO
57 Y/O F w/ PMHx of ESRD on HD, NICM w HFrEF, DM, A fib, pericardial effusion, chronic right pleural effusion presents for SOB.  used to obtain information from pt. Pt states good appetite/po intake currently and PTA. Lunch tray observed at bedside, 90% eaten per plate waste. Pt follows a regular diet at home. Noted with 5 lb wt loss x ~5 weeks per EMR review likely related to fluid as pt has good po intake. Pt receptive to receive written diet education.

## 2018-11-23 NOTE — PROGRESS NOTE ADULT - ASSESSMENT
ESRD: + R pleural effusion ---> S/P drainage , + CM   - HD tomorrow   - UF as tolerates  - Cardio follow up noted     Anemia: +CRF  - Iron stores OK   - add Epogen with HD     RO: cont CaCO3 and rocaltrol

## 2018-11-23 NOTE — DIETITIAN INITIAL EVALUATION ADULT. - DIET TYPE
1200ml/renal replacement pts:no protein restr,no conc K & phos, low sodium/consistent carbohydrate (no snacks)/DASH/TLC (sodium and cholesterol restricted diet)

## 2018-11-23 NOTE — PROGRESS NOTE ADULT - ASSESSMENT
59 y/o  female with ESRD on HD M/W/F, HFrEF LVEF 20-25% from echo in 10/2018, NICM (non-obstructive CAD on cath 3/2018), insulin dependent diabetes mellitus, hypertension, hyperlipidemia, paroxysmal atrial fibrillation (not on AC due to hx of GI bleed, has not followed up in the office regarding watchman referral), uremic pericarditis s/p pericardiocentesis, right sided pleural effusion s/p thoracentesis 9/2018, hx of dietary indiscretions, presents with worsening shortness of breath at home.  Found with large right sided pleural effusion by PCP. Compliant with HD sessions and medications.  No chest pain, dizziness.  Reports orthopnea and cough in supine position. Imaging showed pulmonary edema, small left pleural effusion, moderate, increasing right sided pleural effusion.      # dyspnea - resolved  - largely due to pleural effusion/volume overload  # recurrent right sided pleural effusion  - CTS on board, s/p thoracentesis (1350 mL) by interventional radiology  # NICM, decompensated, LVEF 20-25% - etiology of heart failure likely due to hypertensive heart disease.  Continue carvedilol and entresto at current doses.  LVEF has been severely depressed since August, has been on OMT since then, however missed follow up appointments in the office with me.  TTE showed mildly reduced systolic function on my review (estimated LVEF 45-50%, read as 50-55%), nonetheless, recovered LVEF.  ICD not warranted.  If not anuric, consider adding diuretic.   # hypertension - milldy elevated, should improve with volume removal, would add diuretic if not anuric   # paroxysmal afib - hx of recurrent GI bleed in the past, deemed high bleeding risk.  Has been previously referred for watchman, however has missed appointments in the office - patient willing to follow up as an outpatient regarding this.   # ESRD - HD per renal.     Thank you for allowing me to participate in care of your patient.   Stable from cardiac standpoint  Please call with questions.   Outpatient follow up in 2 weeks.

## 2018-11-24 LAB
ANION GAP SERPL CALC-SCNC: 16 MMOL/L — SIGNIFICANT CHANGE UP (ref 5–17)
ANION GAP SERPL CALC-SCNC: 17 MMOL/L — SIGNIFICANT CHANGE UP (ref 5–17)
BUN SERPL-MCNC: 67 MG/DL — HIGH (ref 8–20)
BUN SERPL-MCNC: 67 MG/DL — HIGH (ref 8–20)
CALCIUM SERPL-MCNC: 9.6 MG/DL — SIGNIFICANT CHANGE UP (ref 8.6–10.2)
CALCIUM SERPL-MCNC: 9.7 MG/DL — SIGNIFICANT CHANGE UP (ref 8.6–10.2)
CHLORIDE SERPL-SCNC: 89 MMOL/L — LOW (ref 98–107)
CHLORIDE SERPL-SCNC: 90 MMOL/L — LOW (ref 98–107)
CO2 SERPL-SCNC: 26 MMOL/L — SIGNIFICANT CHANGE UP (ref 22–29)
CO2 SERPL-SCNC: 27 MMOL/L — SIGNIFICANT CHANGE UP (ref 22–29)
CREAT SERPL-MCNC: 5.27 MG/DL — HIGH (ref 0.5–1.3)
CREAT SERPL-MCNC: 5.77 MG/DL — HIGH (ref 0.5–1.3)
GLUCOSE BLDC GLUCOMTR-MCNC: 125 MG/DL — HIGH (ref 70–99)
GLUCOSE BLDC GLUCOMTR-MCNC: 137 MG/DL — HIGH (ref 70–99)
GLUCOSE BLDC GLUCOMTR-MCNC: 165 MG/DL — HIGH (ref 70–99)
GLUCOSE BLDC GLUCOMTR-MCNC: 210 MG/DL — HIGH (ref 70–99)
GLUCOSE SERPL-MCNC: 145 MG/DL — HIGH (ref 70–115)
GLUCOSE SERPL-MCNC: 167 MG/DL — HIGH (ref 70–115)
HCT VFR BLD CALC: 23.4 % — LOW (ref 37–47)
HCT VFR BLD CALC: 27 % — LOW (ref 37–47)
HGB BLD-MCNC: 7 G/DL — CRITICAL LOW (ref 12–16)
HGB BLD-MCNC: 8 G/DL — LOW (ref 12–16)
LDH SERPL L TO P-CCNC: 225 U/L — HIGH (ref 98–192)
MCHC RBC-ENTMCNC: 28.6 PG — SIGNIFICANT CHANGE UP (ref 27–31)
MCHC RBC-ENTMCNC: 28.7 PG — SIGNIFICANT CHANGE UP (ref 27–31)
MCHC RBC-ENTMCNC: 29.6 G/DL — LOW (ref 32–36)
MCHC RBC-ENTMCNC: 29.9 G/DL — LOW (ref 32–36)
MCV RBC AUTO: 95.9 FL — SIGNIFICANT CHANGE UP (ref 81–99)
MCV RBC AUTO: 96.4 FL — SIGNIFICANT CHANGE UP (ref 81–99)
PHOSPHATE SERPL-MCNC: 3.8 MG/DL — SIGNIFICANT CHANGE UP (ref 2.4–4.7)
PLATELET # BLD AUTO: 255 K/UL — SIGNIFICANT CHANGE UP (ref 150–400)
PLATELET # BLD AUTO: 282 K/UL — SIGNIFICANT CHANGE UP (ref 150–400)
POTASSIUM SERPL-MCNC: 5.5 MMOL/L — HIGH (ref 3.5–5.3)
POTASSIUM SERPL-MCNC: 5.6 MMOL/L — HIGH (ref 3.5–5.3)
POTASSIUM SERPL-SCNC: 5.5 MMOL/L — HIGH (ref 3.5–5.3)
POTASSIUM SERPL-SCNC: 5.6 MMOL/L — HIGH (ref 3.5–5.3)
RBC # BLD: 2.44 M/UL — LOW (ref 4.4–5.2)
RBC # BLD: 2.8 M/UL — LOW (ref 4.4–5.2)
RBC # FLD: 17.9 % — HIGH (ref 11–15.6)
RBC # FLD: 18 % — HIGH (ref 11–15.6)
SODIUM SERPL-SCNC: 132 MMOL/L — LOW (ref 135–145)
SODIUM SERPL-SCNC: 133 MMOL/L — LOW (ref 135–145)
WBC # BLD: 4.3 K/UL — LOW (ref 4.8–10.8)
WBC # BLD: 5.3 K/UL — SIGNIFICANT CHANGE UP (ref 4.8–10.8)
WBC # FLD AUTO: 4.3 K/UL — LOW (ref 4.8–10.8)
WBC # FLD AUTO: 5.3 K/UL — SIGNIFICANT CHANGE UP (ref 4.8–10.8)

## 2018-11-24 PROCEDURE — 99232 SBSQ HOSP IP/OBS MODERATE 35: CPT

## 2018-11-24 RX ORDER — SODIUM POLYSTYRENE SULFONATE 4.1 MEQ/G
30 POWDER, FOR SUSPENSION ORAL ONCE
Qty: 0 | Refills: 0 | Status: DISCONTINUED | OUTPATIENT
Start: 2018-11-24 | End: 2018-11-24

## 2018-11-24 RX ADMIN — Medication 1 TABLET(S): at 14:24

## 2018-11-24 RX ADMIN — INSULIN GLARGINE 10 UNIT(S): 100 INJECTION, SOLUTION SUBCUTANEOUS at 21:06

## 2018-11-24 RX ADMIN — SACUBITRIL AND VALSARTAN 1 TABLET(S): 24; 26 TABLET, FILM COATED ORAL at 05:13

## 2018-11-24 RX ADMIN — Medication 325 MILLIGRAM(S): at 11:21

## 2018-11-24 RX ADMIN — CALCITRIOL 0.25 MICROGRAM(S): 0.5 CAPSULE ORAL at 11:20

## 2018-11-24 RX ADMIN — CARVEDILOL PHOSPHATE 12.5 MILLIGRAM(S): 80 CAPSULE, EXTENDED RELEASE ORAL at 05:13

## 2018-11-24 RX ADMIN — CARVEDILOL PHOSPHATE 12.5 MILLIGRAM(S): 80 CAPSULE, EXTENDED RELEASE ORAL at 17:04

## 2018-11-24 RX ADMIN — Medication 1 TABLET(S): at 05:13

## 2018-11-24 RX ADMIN — Medication 1 TABLET(S): at 21:06

## 2018-11-24 RX ADMIN — Medication 2: at 08:06

## 2018-11-24 RX ADMIN — SACUBITRIL AND VALSARTAN 1 TABLET(S): 24; 26 TABLET, FILM COATED ORAL at 17:04

## 2018-11-24 RX ADMIN — Medication 4: at 17:45

## 2018-11-24 RX ADMIN — Medication 81 MILLIGRAM(S): at 11:20

## 2018-11-24 RX ADMIN — ERYTHROPOIETIN 10000 UNIT(S): 10000 INJECTION, SOLUTION INTRAVENOUS; SUBCUTANEOUS at 09:55

## 2018-11-24 NOTE — PROGRESS NOTE ADULT - ASSESSMENT
57 Y/O F w/ PMHx of ESRD (on HD MWF), NICM w HFrEF, Dm2 on insulin, pafib no a/c due to GI bleed, pericardial effusion, chronic right pleural effusion presents for shortness of breath (progressive, no fevers, cough). Found to have large right pleural effusion as outpatient and sent to ER for further management by PMD. Underwent IR guided chest tube placement. Needs PT evaluation.    Assessment and Plan:     1. Acute on chronic systolic heart failure. Resolved  Status post right thoracentesis  On Entresto  Echocardiogram reviewed  Likely secondary to non-compliance    2. Pleural effusion due to congestive heart failure.  Status post oral thoracentesis   titrate off oxygen as needed    3. End stage renal disease on dialysis with hyperkalemia:   HD per renal.       4. Anemia due to chronic kidney disease, on chronic dialysis.  Continue epogen and ferrous sulfate    5.  Type 2 diabetes mellitus with complication, with long-term current use of insulin.  Plan: lantus, raiss.     PT evaluation 57 Y/O F w/ PMHx of ESRD (on HD MWF), NICM w HFrEF, Dm2 on insulin, pafib no a/c due to GI bleed, pericardial effusion, chronic right pleural effusion presents for shortness of breath (progressive, no fevers, cough). Found to have large right pleural effusion as outpatient and sent to ER for further management by PMD. Underwent IR thoracentesis   Needs PT evaluation.    Assessment and Plan:     1. Acute on chronic systolic heart failure. Resolved  Status post right thoracentesis  On Entresto  Echocardiogram reviewed  Likely secondary to non-compliance    2. Pleural effusion due to congestive heart failure.  Status post thoracentesis   titrate off oxygen as needed    3. End stage renal disease on dialysis with hyperkalemia:   HD per renal.       4. Anemia due to chronic kidney disease, on chronic dialysis.  Continue epogen and ferrous sulfate    5.  Type 2 diabetes mellitus with complication, with long-term current use of insulin.  Plan: lantus, raiss.     PT evaluation

## 2018-11-24 NOTE — PROGRESS NOTE ADULT - ASSESSMENT
ESRD: + R pleural effusion ---> S/P drainage , + CM   - HD today   - UF as tolerates  - Cardio follow up noted     Anemia: +CRF  - Iron stores OK   - added Epogen with HD   -CBC in am     RO: cont CaCO3 and rocaltrol

## 2018-11-25 ENCOUNTER — TRANSCRIPTION ENCOUNTER (OUTPATIENT)
Age: 58
End: 2018-11-25

## 2018-11-25 LAB
ANION GAP SERPL CALC-SCNC: 10 MMOL/L — SIGNIFICANT CHANGE UP (ref 5–17)
BUN SERPL-MCNC: 53 MG/DL — HIGH (ref 8–20)
CALCIUM SERPL-MCNC: 9 MG/DL — SIGNIFICANT CHANGE UP (ref 8.6–10.2)
CHLORIDE SERPL-SCNC: 97 MMOL/L — LOW (ref 98–107)
CO2 SERPL-SCNC: 27 MMOL/L — SIGNIFICANT CHANGE UP (ref 22–29)
CREAT SERPL-MCNC: 4.85 MG/DL — HIGH (ref 0.5–1.3)
GLUCOSE BLDC GLUCOMTR-MCNC: 158 MG/DL — HIGH (ref 70–99)
GLUCOSE BLDC GLUCOMTR-MCNC: 237 MG/DL — HIGH (ref 70–99)
GLUCOSE BLDC GLUCOMTR-MCNC: 91 MG/DL — SIGNIFICANT CHANGE UP (ref 70–99)
GLUCOSE BLDC GLUCOMTR-MCNC: 93 MG/DL — SIGNIFICANT CHANGE UP (ref 70–99)
GLUCOSE SERPL-MCNC: 87 MG/DL — SIGNIFICANT CHANGE UP (ref 70–115)
HCT VFR BLD CALC: 24.3 % — LOW (ref 37–47)
HGB BLD-MCNC: 7.1 G/DL — LOW (ref 12–16)
MCHC RBC-ENTMCNC: 28.1 PG — SIGNIFICANT CHANGE UP (ref 27–31)
MCHC RBC-ENTMCNC: 29.2 G/DL — LOW (ref 32–36)
MCV RBC AUTO: 96 FL — SIGNIFICANT CHANGE UP (ref 81–99)
PLATELET # BLD AUTO: 256 K/UL — SIGNIFICANT CHANGE UP (ref 150–400)
POTASSIUM SERPL-MCNC: 5.1 MMOL/L — SIGNIFICANT CHANGE UP (ref 3.5–5.3)
POTASSIUM SERPL-SCNC: 5.1 MMOL/L — SIGNIFICANT CHANGE UP (ref 3.5–5.3)
RBC # BLD: 2.53 M/UL — LOW (ref 4.4–5.2)
RBC # FLD: 17.1 % — HIGH (ref 11–15.6)
SODIUM SERPL-SCNC: 134 MMOL/L — LOW (ref 135–145)
WBC # BLD: 3.8 K/UL — LOW (ref 4.8–10.8)
WBC # FLD AUTO: 3.8 K/UL — LOW (ref 4.8–10.8)

## 2018-11-25 PROCEDURE — 71046 X-RAY EXAM CHEST 2 VIEWS: CPT | Mod: 26

## 2018-11-25 PROCEDURE — 99232 SBSQ HOSP IP/OBS MODERATE 35: CPT

## 2018-11-25 RX ADMIN — SACUBITRIL AND VALSARTAN 1 TABLET(S): 24; 26 TABLET, FILM COATED ORAL at 05:02

## 2018-11-25 RX ADMIN — Medication 2: at 12:30

## 2018-11-25 RX ADMIN — Medication 1 TABLET(S): at 21:09

## 2018-11-25 RX ADMIN — Medication 1 TABLET(S): at 14:04

## 2018-11-25 RX ADMIN — Medication 81 MILLIGRAM(S): at 12:30

## 2018-11-25 RX ADMIN — Medication 1 TABLET(S): at 05:02

## 2018-11-25 RX ADMIN — INSULIN GLARGINE 10 UNIT(S): 100 INJECTION, SOLUTION SUBCUTANEOUS at 21:10

## 2018-11-25 RX ADMIN — CALCITRIOL 0.25 MICROGRAM(S): 0.5 CAPSULE ORAL at 12:30

## 2018-11-25 RX ADMIN — Medication 4: at 17:47

## 2018-11-25 RX ADMIN — SACUBITRIL AND VALSARTAN 1 TABLET(S): 24; 26 TABLET, FILM COATED ORAL at 17:46

## 2018-11-25 RX ADMIN — CARVEDILOL PHOSPHATE 12.5 MILLIGRAM(S): 80 CAPSULE, EXTENDED RELEASE ORAL at 05:03

## 2018-11-25 RX ADMIN — CARVEDILOL PHOSPHATE 12.5 MILLIGRAM(S): 80 CAPSULE, EXTENDED RELEASE ORAL at 17:46

## 2018-11-25 RX ADMIN — Medication 325 MILLIGRAM(S): at 12:30

## 2018-11-25 NOTE — PROGRESS NOTE ADULT - ASSESSMENT
ESRD: + R pleural effusion ---> S/P drainage , + CM ----> Feels better   - HD TTS schedule   - UF as tolerates  - Cardio follow up noted     Anemia: +CRF  - Iron stores OK   - added Epogen with HD   -CBC in am     RO: cont CaCO3 and rocaltrol

## 2018-11-25 NOTE — DISCHARGE NOTE ADULT - CARE PLAN
Principal Discharge DX:	Acute on chronic systolic heart failure  Secondary Diagnosis:	Anemia due to chronic kidney disease, on chronic dialysis  Secondary Diagnosis:	End stage renal disease on dialysis Principal Discharge DX:	Acute on chronic systolic heart failure  Goal:	Prevention  Assessment and plan of treatment:	Continue entresto and coreg  follow up with your cardiologist and nephrologist  Secondary Diagnosis:	Anemia due to chronic kidney disease, on chronic dialysis  Assessment and plan of treatment:	Continue epogen and ferrous sulfate  Secondary Diagnosis:	End stage renal disease on dialysis  Assessment and plan of treatment:	Conitnue HD MWF

## 2018-11-25 NOTE — DISCHARGE NOTE ADULT - PLAN OF CARE
Prevention Continue entresto and coreg  follow up with your cardiologist and nephrologist Continue epogen and ferrous sulfate Steven  MWF

## 2018-11-25 NOTE — PROGRESS NOTE ADULT - ASSESSMENT
59 Y/O F w/ PMHx of ESRD (on HD MWF), NICM w HFrEF, Dm2 on insulin, pafib no a/c due to GI bleed, pericardial effusion, chronic right pleural effusion presented  for shortness of breath (progressive, no fevers, cough). Found to have large right pleural effusion as outpatient and sent to ER for further management by PMD. Underwent IR thoracentesis with removal of 1350ml of fluid. Evaluated by cardiology, echocardiogram with LVSF of 50-55% with grade 1 diastolic dysfunction. To continue bb and entresto. PT evaluation pending.     Assessment and Plan:     1. Acute on chronic diastolic  heart failure. Resolved  Status post right thoracentesis  On Entresto  Echocardiogram reviewed  Likely secondary to non-compliance    2. Pleural effusion due to congestive heart failure.  Status post thoracentesis   titrate off oxygen as needed  Follow up chest xray     3. End stage renal disease on dialysis with hyperkalemia:   HD per renal.       4. Anemia due to chronic kidney disease, on chronic dialysis.  Continue epogen and ferrous sulfate    5.  Type 2 diabetes mellitus with complication, with long-term current use of insulin.  Plan: lantus, raiss.     PT evaluation    Discharge planning

## 2018-11-25 NOTE — DISCHARGE NOTE ADULT - CARE PROVIDER_API CALL
Gissel Salazar (DO), Cardiology; Internal Medicine  [address] [place] [address]  Phone: [phone]  Fax: [phone]

## 2018-11-25 NOTE — DISCHARGE NOTE ADULT - HOSPITAL COURSE
59 Y/O F w/ PMHx of ESRD (on HD MWF), NICM w HFrEF, Dm2 on insulin, pafib no a/c due to GI bleed, pericardial effusion, chronic right pleural effusion presented  for shortness of breath (progressive, no fevers, cough). Found to have large right pleural effusion as outpatient and sent to ER for further management by PMD. Underwent IR thoracentesis with removal of 1350ml of fluid. Evaluated by cardiology, echocardiogram with LVSF of 50-55% with grade 1 diastolic dysfunction. To continue bb and entresto. PT evaluation pending. 59 Y/O F w/ PMHx of ESRD (on HD MWF), NICM w HFrEF, Dm2 on insulin, pafib no a/c due to GI bleed, pericardial effusion, chronic right pleural effusion presented  for shortness of breath (progressive, no fevers, cough). Found to have large right pleural effusion as outpatient and sent to ER for further management by PMD. Underwent IR thoracentesis with removal of 1350ml of fluid. Evaluated by cardiology, echocardiogram with LVSF of 50-55% with grade 1 diastolic dysfunction. To continue bb and entresto. PT evaluation- Home. Repeat chest xray improved. To be discharged home. 59 Y/O F w/ PMHx of ESRD (on HD MWF), NICM w HFrEF, Dm2 on insulin, pafib no a/c due to GI bleed, pericardial effusion, chronic right pleural effusion presented  for shortness of breath (progressive, no fevers, cough). Found to have large right pleural effusion as outpatient and sent to ER for further management by PMD. Underwent IR thoracentesis with removal of 1350ml of fluid. Evaluated by cardiology, echocardiogram with LVSF of 50-55% with grade 1 diastolic dysfunction. To continue bb and entresto. PT evaluation- Home. Repeat chest xray improved. To be discharged home.     48 minutes spent coordinating care and discharge

## 2018-11-25 NOTE — DISCHARGE NOTE ADULT - PATIENT PORTAL LINK FT
You can access the [facility] Patient Portal, offered by [facility], by registering with the following website: [email or web address]

## 2018-11-25 NOTE — DISCHARGE NOTE ADULT - MEDICATION SUMMARY - MEDICATIONS TO TAKE
I will START or STAY ON the medications listed below when I get home from the hospital:    aspirin 81 mg oral delayed release tablet  -- 1 tab(s) by mouth once a day  -- Indication: For cad    sacubitril-valsartan 97 mg-103 mg oral tablet  -- 1 tab(s) by mouth 2 times a day  -- Indication: For chf    calcium carbonate 1250 mg (500 mg elemental calcium) oral tablet  -- 1 tab(s) by mouth 3 times a day  -- Indication: For supplement    Lantus 100 units/mL subcutaneous solution  -- 10 unit(s) subcutaneous once a day (at bedtime)  -- Indication: For Diabetes    Coreg 12.5 mg oral tablet  -- 1 tab(s) by mouth 2 times a day   -- It is very important that you take or use this exactly as directed.  Do not skip doses or discontinue unless directed by your doctor.  May cause drowsiness.  Alcohol may intensify this effect.  Use care when operating dangerous machinery.  Some non-prescription drugs may aggravate your condition.  Read all labels carefully.  If a warning appears, check with your doctor before taking.  Take with food or milk.    -- Indication: For chf    FeroSul 325 mg (65 mg elemental iron) oral tablet  -- 1 tab(s) by mouth once a day   -- Indication: For Anemia due to chronic kidney disease, on chronic dialysis    Colace 100 mg oral capsule  -- 1 cap(s) by mouth 2 times a day, As Needed -for constipation   -- Indication: For constipation    senna oral tablet  -- 2 tab(s) by mouth once a day (at bedtime), As needed, Constipation  -- Indication: For constipation    pantoprazole 40 mg oral delayed release tablet  -- 1 tab(s) by mouth once a day (before a meal)  -- Indication: For gerd    calcitriol 0.25 mcg oral capsule  -- 1 cap(s) by mouth once a day  -- Indication: For supplement

## 2018-11-26 LAB
GLUCOSE BLDC GLUCOMTR-MCNC: 178 MG/DL — HIGH (ref 70–99)
GLUCOSE BLDC GLUCOMTR-MCNC: 188 MG/DL — HIGH (ref 70–99)
GLUCOSE BLDC GLUCOMTR-MCNC: 96 MG/DL — SIGNIFICANT CHANGE UP (ref 70–99)
HCT VFR BLD CALC: 24.6 % — LOW (ref 37–47)
HGB BLD-MCNC: 7.4 G/DL — LOW (ref 12–16)
MCHC RBC-ENTMCNC: 28.2 PG — SIGNIFICANT CHANGE UP (ref 27–31)
MCHC RBC-ENTMCNC: 30.1 G/DL — LOW (ref 32–36)
MCV RBC AUTO: 93.9 FL — SIGNIFICANT CHANGE UP (ref 81–99)
PLATELET # BLD AUTO: 266 K/UL — SIGNIFICANT CHANGE UP (ref 150–400)
RBC # BLD: 2.62 M/UL — LOW (ref 4.4–5.2)
RBC # FLD: 16.5 % — HIGH (ref 11–15.6)
WBC # BLD: 3.6 K/UL — LOW (ref 4.8–10.8)
WBC # FLD AUTO: 3.6 K/UL — LOW (ref 4.8–10.8)

## 2018-11-26 PROCEDURE — 99232 SBSQ HOSP IP/OBS MODERATE 35: CPT

## 2018-11-26 RX ORDER — CHLORHEXIDINE GLUCONATE 213 G/1000ML
1 SOLUTION TOPICAL DAILY
Qty: 0 | Refills: 0 | Status: DISCONTINUED | OUTPATIENT
Start: 2018-11-26 | End: 2018-11-28

## 2018-11-26 RX ORDER — INSULIN GLARGINE 100 [IU]/ML
5 INJECTION, SOLUTION SUBCUTANEOUS AT BEDTIME
Qty: 0 | Refills: 0 | Status: DISCONTINUED | OUTPATIENT
Start: 2018-11-26 | End: 2018-11-28

## 2018-11-26 RX ADMIN — Medication 2: at 12:59

## 2018-11-26 RX ADMIN — CARVEDILOL PHOSPHATE 12.5 MILLIGRAM(S): 80 CAPSULE, EXTENDED RELEASE ORAL at 21:26

## 2018-11-26 RX ADMIN — SACUBITRIL AND VALSARTAN 1 TABLET(S): 24; 26 TABLET, FILM COATED ORAL at 05:08

## 2018-11-26 RX ADMIN — CALCITRIOL 0.25 MICROGRAM(S): 0.5 CAPSULE ORAL at 12:58

## 2018-11-26 RX ADMIN — Medication 1 TABLET(S): at 21:25

## 2018-11-26 RX ADMIN — CHLORHEXIDINE GLUCONATE 1 APPLICATION(S): 213 SOLUTION TOPICAL at 13:01

## 2018-11-26 RX ADMIN — Medication 81 MILLIGRAM(S): at 12:58

## 2018-11-26 RX ADMIN — SACUBITRIL AND VALSARTAN 1 TABLET(S): 24; 26 TABLET, FILM COATED ORAL at 22:19

## 2018-11-26 RX ADMIN — ERYTHROPOIETIN 10000 UNIT(S): 10000 INJECTION, SOLUTION INTRAVENOUS; SUBCUTANEOUS at 17:49

## 2018-11-26 RX ADMIN — Medication 1 TABLET(S): at 14:38

## 2018-11-26 RX ADMIN — Medication 325 MILLIGRAM(S): at 12:59

## 2018-11-26 RX ADMIN — CARVEDILOL PHOSPHATE 12.5 MILLIGRAM(S): 80 CAPSULE, EXTENDED RELEASE ORAL at 05:09

## 2018-11-26 RX ADMIN — Medication 1 TABLET(S): at 05:09

## 2018-11-26 RX ADMIN — INSULIN GLARGINE 5 UNIT(S): 100 INJECTION, SOLUTION SUBCUTANEOUS at 21:37

## 2018-11-26 NOTE — CDI QUERY NOTE - NSCDIOTHERTXTBX_GEN_ALL_CORE_HH
The Physician's or Provider's documentation of the patient's presentation, evaluation and medical management, as identified below, may support a diagnosis that is not documented to the furthest specificity in the medical record. Please clarify in your progress notes and/or discharge summary if there is a corresponding diagnosis associated with the clinical information described below:    Can the patient's respiratory status be clarified if known?    -Acute on chronic respiratory failure  -Acute respiratory failure                                                                     -Other (specify)  -Unknown    SUPPORTING DOCUMENTATION AND/OR CLINICAL EVIDENCE:    11/21/18 @ 1338 ED Provider Note:  · SpO2 (%) SpO2 (%): 89  · O2 delivery Patient On: room air  FAMILY STATES PT IS SOB AND HAVING TROUBLE SLEEPING B/C SHE FEELS LIKE SHE CANNOT BREATHE.  Oxygen Delivery Therapy,   Oxygen Method: Nasal Cannula  LPM: 5, 21-Nov-2018,   in Emergency Department     pt sat 88 in triage    ED Nursing Documentation on 11/21/18  Nursing documentation states patient is on home oxygen.    Please consider the PF Ratio with SPO2 of 88% ( equal to 54 PO2) on room air.

## 2018-11-26 NOTE — PROGRESS NOTE ADULT - ASSESSMENT
· Assessment		  ESRD: + R pleural effusion ---> S/P drainage , + CM ----> Feels better   - UF as tolerates  - Cardio follow up noted   Additional HD today ( Normally gets MWF )     Anemia: +CRF  - Iron stores OK   - added Epogen with HD       RO: cont CaCO3 and rocaltrol

## 2018-11-26 NOTE — PROGRESS NOTE ADULT - ASSESSMENT
57 Y/O F w/ PMHx of ESRD (on HD MWF), NICM w HFrEF, Dm2 on insulin, pafib no a/c due to GI bleed, pericardial effusion, chronic right pleural effusion presented  for shortness of breath (progressive, no fevers, cough). Found to have large right pleural effusion as outpatient and sent to ER for further management by PMD. Underwent IR thoracentesis with removal of 1350ml of fluid. Evaluated by cardiology, echocardiogram with LVSF of 50-55% with grade 1 diastolic dysfunction. To continue bb and entresto. Repeat chest xray with worsening pulmonary edema and effusions.     Assessment and Plan:     1. Acute on chronic diastolic  heart failure.   Status post right thoracentesis. Follow up chest xray now with worsening effusions and pulmonary edema.   renal called for HD today; repeat chest xray after HD  On Entresto  Echocardiogram reviewed      2. Pleural effusion due to congestive heart failure.  Status post thoracentesis   titrate off oxygen as needed  Follow up chest xray after HD today     3. End stage renal disease on dialysis with hyperkalemia:  HD MWF; Renal called for HD today.        4. Anemia due to chronic kidney disease, on chronic dialysis.  Continue epogen and ferrous sulfate    5.  Type 2 diabetes mellitus with complication, with long-term current use of insulin. : Decrease lantus to 5 units at bedtime. Continue HSS monitor bsl.       PT evaluation 57 Y/O F w/ PMHx of ESRD (on HD MWF), NICM w HFrEF, Dm2 on insulin, pafib no a/c due to GI bleed, pericardial effusion, chronic right pleural effusion presented  for shortness of breath (progressive, no fevers, cough). Found to have large right pleural effusion as outpatient and sent to ER for further management by PMD. Underwent IR thoracentesis with removal of 1350ml of fluid. Evaluated by cardiology, echocardiogram with LVSF of 50-55% with grade 1 diastolic dysfunction. To continue bb and entresto. Repeat chest xray with worsening pulmonary edema and effusions.     Assessment and Plan:     1. Acute on chronic diastolic  heart failure.   Status post right thoracentesis. Follow up chest xray now with worsening effusions and pulmonary edema.   renal called for HD today; repeat chest xray after HD  On Entresto  Echocardiogram reviewed      2. Acute hypoxic respiratory failure on admission secondary to  Pleural effusion due to congestive heart failure.  Status post thoracentesis: Now resolving.   titrate off oxygen as needed  Follow up chest xray after HD today     3. End stage renal disease on dialysis with hyperkalemia:  HD MWF; Renal called for HD today.        4. Anemia due to chronic kidney disease, on chronic dialysis.  Continue epogen and ferrous sulfate    5.  Type 2 diabetes mellitus with complication, with long-term current use of insulin. : Decrease lantus to 5 units at bedtime. Continue HSS monitor bsl.       PT evaluation

## 2018-11-27 LAB
GLUCOSE BLDC GLUCOMTR-MCNC: 106 MG/DL — HIGH (ref 70–99)
GLUCOSE BLDC GLUCOMTR-MCNC: 156 MG/DL — HIGH (ref 70–99)
GLUCOSE BLDC GLUCOMTR-MCNC: 212 MG/DL — HIGH (ref 70–99)
GLUCOSE BLDC GLUCOMTR-MCNC: 82 MG/DL — SIGNIFICANT CHANGE UP (ref 70–99)
GLUCOSE BLDC GLUCOMTR-MCNC: 88 MG/DL — SIGNIFICANT CHANGE UP (ref 70–99)
NON-GYNECOLOGICAL CYTOLOGY STUDY: SIGNIFICANT CHANGE UP

## 2018-11-27 PROCEDURE — 87633 RESP VIRUS 12-25 TARGETS: CPT

## 2018-11-27 PROCEDURE — T1013: CPT

## 2018-11-27 PROCEDURE — 71046 X-RAY EXAM CHEST 2 VIEWS: CPT

## 2018-11-27 PROCEDURE — 36415 COLL VENOUS BLD VENIPUNCTURE: CPT

## 2018-11-27 PROCEDURE — 85730 THROMBOPLASTIN TIME PARTIAL: CPT

## 2018-11-27 PROCEDURE — 83735 ASSAY OF MAGNESIUM: CPT

## 2018-11-27 PROCEDURE — 86923 COMPATIBILITY TEST ELECTRIC: CPT

## 2018-11-27 PROCEDURE — 36430 TRANSFUSION BLD/BLD COMPNT: CPT

## 2018-11-27 PROCEDURE — 87581 M.PNEUMON DNA AMP PROBE: CPT

## 2018-11-27 PROCEDURE — 71046 X-RAY EXAM CHEST 2 VIEWS: CPT | Mod: 26

## 2018-11-27 PROCEDURE — 87486 CHLMYD PNEUM DNA AMP PROBE: CPT

## 2018-11-27 PROCEDURE — 71250 CT THORAX DX C-: CPT

## 2018-11-27 PROCEDURE — 84466 ASSAY OF TRANSFERRIN: CPT

## 2018-11-27 PROCEDURE — 83550 IRON BINDING TEST: CPT

## 2018-11-27 PROCEDURE — 83605 ASSAY OF LACTIC ACID: CPT

## 2018-11-27 PROCEDURE — 71045 X-RAY EXAM CHEST 1 VIEW: CPT

## 2018-11-27 PROCEDURE — 99285 EMERGENCY DEPT VISIT HI MDM: CPT | Mod: 25

## 2018-11-27 PROCEDURE — P9016: CPT

## 2018-11-27 PROCEDURE — 99232 SBSQ HOSP IP/OBS MODERATE 35: CPT

## 2018-11-27 PROCEDURE — 99261: CPT

## 2018-11-27 PROCEDURE — 86900 BLOOD TYPING SEROLOGIC ABO: CPT

## 2018-11-27 PROCEDURE — 94640 AIRWAY INHALATION TREATMENT: CPT

## 2018-11-27 PROCEDURE — 85610 PROTHROMBIN TIME: CPT

## 2018-11-27 PROCEDURE — 87340 HEPATITIS B SURFACE AG IA: CPT

## 2018-11-27 PROCEDURE — 93005 ELECTROCARDIOGRAM TRACING: CPT

## 2018-11-27 PROCEDURE — 84145 PROCALCITONIN (PCT): CPT

## 2018-11-27 PROCEDURE — 86901 BLOOD TYPING SEROLOGIC RH(D): CPT

## 2018-11-27 PROCEDURE — 85045 AUTOMATED RETICULOCYTE COUNT: CPT

## 2018-11-27 PROCEDURE — 82962 GLUCOSE BLOOD TEST: CPT

## 2018-11-27 PROCEDURE — 86850 RBC ANTIBODY SCREEN: CPT

## 2018-11-27 PROCEDURE — 93308 TTE F-UP OR LMTD: CPT

## 2018-11-27 PROCEDURE — 82728 ASSAY OF FERRITIN: CPT

## 2018-11-27 PROCEDURE — 80053 COMPREHEN METABOLIC PANEL: CPT

## 2018-11-27 PROCEDURE — 84100 ASSAY OF PHOSPHORUS: CPT

## 2018-11-27 PROCEDURE — 85027 COMPLETE CBC AUTOMATED: CPT

## 2018-11-27 PROCEDURE — 80048 BASIC METABOLIC PNL TOTAL CA: CPT

## 2018-11-27 PROCEDURE — 87798 DETECT AGENT NOS DNA AMP: CPT

## 2018-11-27 PROCEDURE — 87040 BLOOD CULTURE FOR BACTERIA: CPT

## 2018-11-27 RX ADMIN — CARVEDILOL PHOSPHATE 12.5 MILLIGRAM(S): 80 CAPSULE, EXTENDED RELEASE ORAL at 05:07

## 2018-11-27 RX ADMIN — Medication 1 TABLET(S): at 21:53

## 2018-11-27 RX ADMIN — CARVEDILOL PHOSPHATE 12.5 MILLIGRAM(S): 80 CAPSULE, EXTENDED RELEASE ORAL at 18:16

## 2018-11-27 RX ADMIN — SACUBITRIL AND VALSARTAN 1 TABLET(S): 24; 26 TABLET, FILM COATED ORAL at 18:14

## 2018-11-27 RX ADMIN — Medication 81 MILLIGRAM(S): at 12:41

## 2018-11-27 RX ADMIN — Medication 1 TABLET(S): at 13:01

## 2018-11-27 RX ADMIN — Medication 1 TABLET(S): at 05:06

## 2018-11-27 RX ADMIN — CHLORHEXIDINE GLUCONATE 1 APPLICATION(S): 213 SOLUTION TOPICAL at 12:43

## 2018-11-27 RX ADMIN — SACUBITRIL AND VALSARTAN 1 TABLET(S): 24; 26 TABLET, FILM COATED ORAL at 05:06

## 2018-11-27 RX ADMIN — INSULIN GLARGINE 5 UNIT(S): 100 INJECTION, SOLUTION SUBCUTANEOUS at 21:55

## 2018-11-27 RX ADMIN — Medication 325 MILLIGRAM(S): at 12:41

## 2018-11-27 RX ADMIN — CALCITRIOL 0.25 MICROGRAM(S): 0.5 CAPSULE ORAL at 12:41

## 2018-11-27 RX ADMIN — Medication 2: at 12:37

## 2018-11-27 NOTE — PROGRESS NOTE ADULT - ASSESSMENT
57 Y/O F w/ PMHx of ESRD (on HD MWF), NICM w HFrEF, Dm2 on insulin, pafib no a/c due to GI bleed, pericardial effusion, chronic right pleural effusion presented  for shortness of breath (progressive, no fevers, cough). Found to have large right pleural effusion as outpatient and sent to ER for further management by PMD. Underwent IR thoracentesis with removal of 1350ml of fluid. Evaluated by cardiology, echocardiogram with LVSF of 50-55% with grade 1 diastolic dysfunction. To continue bb and entresto. Repeat chest xray with worsening pulmonary edema and effusions.     Assessment and Plan:     1. Acute on chronic diastolic  heart failure.   Status post right thoracentesis. Follow up chest xray now with worsening effusions and pulmonary edema.   Status post HD yesterday with improvement on chest xray  On Entresto  Echocardiogram reviewed      2. Acute hypoxic respiratory failure on admission secondary to  Pleural effusion due to congestive heart failure.  Status post thoracentesis: Now resolving.   titrate off oxygen as needed    3. End stage renal disease on dialysis with hyperkalemia:  HD MWF      4. Anemia due to chronic kidney disease, on chronic dialysis.  Continue epogen and ferrous sulfate    5.  Type 2 diabetes mellitus with complication, with long-term current use of insulin. : Decrease lantus to 5 units at bedtime. Continue HSS monitor bsl.     Spoke with SWl; hep c ab positive which is new. Repeat ordered as well as PCR by Renal. Discharge back to community HD pending results

## 2018-11-28 VITALS
DIASTOLIC BLOOD PRESSURE: 68 MMHG | SYSTOLIC BLOOD PRESSURE: 145 MMHG | HEART RATE: 71 BPM | RESPIRATION RATE: 18 BRPM | TEMPERATURE: 98 F

## 2018-11-28 LAB
ANION GAP SERPL CALC-SCNC: 12 MMOL/L — SIGNIFICANT CHANGE UP (ref 5–17)
BUN SERPL-MCNC: 44 MG/DL — HIGH (ref 8–20)
CALCIUM SERPL-MCNC: 9.5 MG/DL — SIGNIFICANT CHANGE UP (ref 8.6–10.2)
CHLORIDE SERPL-SCNC: 95 MMOL/L — LOW (ref 98–107)
CO2 SERPL-SCNC: 24 MMOL/L — SIGNIFICANT CHANGE UP (ref 22–29)
CREAT SERPL-MCNC: 4.93 MG/DL — HIGH (ref 0.5–1.3)
CULTURE RESULTS: SIGNIFICANT CHANGE UP
GLUCOSE BLDC GLUCOMTR-MCNC: 118 MG/DL — HIGH (ref 70–99)
GLUCOSE BLDC GLUCOMTR-MCNC: 139 MG/DL — HIGH (ref 70–99)
GLUCOSE BLDC GLUCOMTR-MCNC: 144 MG/DL — HIGH (ref 70–99)
GLUCOSE BLDC GLUCOMTR-MCNC: 199 MG/DL — HIGH (ref 70–99)
GLUCOSE SERPL-MCNC: 96 MG/DL — SIGNIFICANT CHANGE UP (ref 70–115)
HCT VFR BLD CALC: 25.3 % — LOW (ref 37–47)
HCV AB S/CO SERPL IA: 0.15 S/CO — SIGNIFICANT CHANGE UP
HCV AB SERPL-IMP: SIGNIFICANT CHANGE UP
HGB BLD-MCNC: 7.7 G/DL — LOW (ref 12–16)
MCHC RBC-ENTMCNC: 28.8 PG — SIGNIFICANT CHANGE UP (ref 27–31)
MCHC RBC-ENTMCNC: 30.4 G/DL — LOW (ref 32–36)
MCV RBC AUTO: 94.8 FL — SIGNIFICANT CHANGE UP (ref 81–99)
PHOSPHATE SERPL-MCNC: 2.8 MG/DL — SIGNIFICANT CHANGE UP (ref 2.4–4.7)
PLATELET # BLD AUTO: 143 K/UL — LOW (ref 150–400)
POTASSIUM SERPL-MCNC: 5.4 MMOL/L — HIGH (ref 3.5–5.3)
POTASSIUM SERPL-SCNC: 5.4 MMOL/L — HIGH (ref 3.5–5.3)
RBC # BLD: 2.67 M/UL — LOW (ref 4.4–5.2)
RBC # FLD: 17 % — HIGH (ref 11–15.6)
SODIUM SERPL-SCNC: 131 MMOL/L — LOW (ref 135–145)
SPECIMEN SOURCE: SIGNIFICANT CHANGE UP
WBC # BLD: 3.5 K/UL — LOW (ref 4.8–10.8)
WBC # FLD AUTO: 3.5 K/UL — LOW (ref 4.8–10.8)

## 2018-11-28 PROCEDURE — 76942 ECHO GUIDE FOR BIOPSY: CPT

## 2018-11-28 PROCEDURE — 82728 ASSAY OF FERRITIN: CPT

## 2018-11-28 PROCEDURE — 85027 COMPLETE CBC AUTOMATED: CPT

## 2018-11-28 PROCEDURE — 84466 ASSAY OF TRANSFERRIN: CPT

## 2018-11-28 PROCEDURE — 88112 CYTOPATH CELL ENHANCE TECH: CPT

## 2018-11-28 PROCEDURE — 87070 CULTURE OTHR SPECIMN AEROBIC: CPT

## 2018-11-28 PROCEDURE — 99285 EMERGENCY DEPT VISIT HI MDM: CPT

## 2018-11-28 PROCEDURE — 87521 HEPATITIS C PROBE&RVRS TRNSC: CPT

## 2018-11-28 PROCEDURE — T1013: CPT

## 2018-11-28 PROCEDURE — 87205 SMEAR GRAM STAIN: CPT

## 2018-11-28 PROCEDURE — 85610 PROTHROMBIN TIME: CPT

## 2018-11-28 PROCEDURE — 84157 ASSAY OF PROTEIN OTHER: CPT

## 2018-11-28 PROCEDURE — 93306 TTE W/DOPPLER COMPLETE: CPT

## 2018-11-28 PROCEDURE — 36415 COLL VENOUS BLD VENIPUNCTURE: CPT

## 2018-11-28 PROCEDURE — 84100 ASSAY OF PHOSPHORUS: CPT

## 2018-11-28 PROCEDURE — 71045 X-RAY EXAM CHEST 1 VIEW: CPT

## 2018-11-28 PROCEDURE — 86803 HEPATITIS C AB TEST: CPT

## 2018-11-28 PROCEDURE — 93005 ELECTROCARDIOGRAM TRACING: CPT

## 2018-11-28 PROCEDURE — 89051 BODY FLUID CELL COUNT: CPT

## 2018-11-28 PROCEDURE — 83550 IRON BINDING TEST: CPT

## 2018-11-28 PROCEDURE — 83615 LACTATE (LD) (LDH) ENZYME: CPT

## 2018-11-28 PROCEDURE — 82945 GLUCOSE OTHER FLUID: CPT

## 2018-11-28 PROCEDURE — 83986 ASSAY PH BODY FLUID NOS: CPT

## 2018-11-28 PROCEDURE — 71046 X-RAY EXAM CHEST 2 VIEWS: CPT

## 2018-11-28 PROCEDURE — 82042 OTHER SOURCE ALBUMIN QUAN EA: CPT

## 2018-11-28 PROCEDURE — 99261: CPT

## 2018-11-28 PROCEDURE — 85730 THROMBOPLASTIN TIME PARTIAL: CPT

## 2018-11-28 PROCEDURE — 99239 HOSP IP/OBS DSCHRG MGMT >30: CPT

## 2018-11-28 PROCEDURE — 87075 CULTR BACTERIA EXCEPT BLOOD: CPT

## 2018-11-28 PROCEDURE — 80048 BASIC METABOLIC PNL TOTAL CA: CPT

## 2018-11-28 PROCEDURE — 83540 ASSAY OF IRON: CPT

## 2018-11-28 PROCEDURE — 80053 COMPREHEN METABOLIC PANEL: CPT

## 2018-11-28 PROCEDURE — 88305 TISSUE EXAM BY PATHOLOGIST: CPT

## 2018-11-28 PROCEDURE — 82962 GLUCOSE BLOOD TEST: CPT

## 2018-11-28 PROCEDURE — 97163 PT EVAL HIGH COMPLEX 45 MIN: CPT

## 2018-11-28 PROCEDURE — 71250 CT THORAX DX C-: CPT

## 2018-11-28 RX ADMIN — Medication 1 TABLET(S): at 05:38

## 2018-11-28 RX ADMIN — SACUBITRIL AND VALSARTAN 1 TABLET(S): 24; 26 TABLET, FILM COATED ORAL at 05:38

## 2018-11-28 RX ADMIN — Medication 81 MILLIGRAM(S): at 16:17

## 2018-11-28 RX ADMIN — CHLORHEXIDINE GLUCONATE 1 APPLICATION(S): 213 SOLUTION TOPICAL at 11:58

## 2018-11-28 RX ADMIN — ERYTHROPOIETIN 10000 UNIT(S): 10000 INJECTION, SOLUTION INTRAVENOUS; SUBCUTANEOUS at 14:00

## 2018-11-28 RX ADMIN — CARVEDILOL PHOSPHATE 12.5 MILLIGRAM(S): 80 CAPSULE, EXTENDED RELEASE ORAL at 05:38

## 2018-11-28 RX ADMIN — CALCITRIOL 0.25 MICROGRAM(S): 0.5 CAPSULE ORAL at 16:17

## 2018-11-28 RX ADMIN — Medication 1 TABLET(S): at 16:18

## 2018-11-28 RX ADMIN — Medication 2: at 16:17

## 2018-11-28 RX ADMIN — Medication 325 MILLIGRAM(S): at 16:17

## 2018-11-28 NOTE — PROGRESS NOTE ADULT - PROVIDER SPECIALTY LIST ADULT
Cardiology
Hospitalist
Hospitalist
Internal Medicine
Nephrology
Internal Medicine

## 2018-11-28 NOTE — PROGRESS NOTE ADULT - REASON FOR ADMISSION
shortness of breath

## 2018-11-28 NOTE — PROGRESS NOTE ADULT - ASSESSMENT
57 Y/O F w/ PMHx of ESRD (on HD MWF), NICM w HFrEF, Dm2 on insulin, pafib no a/c due to GI bleed, pericardial effusion, chronic right pleural effusion presented  for shortness of breath (progressive, no fevers, cough). Found to have large right pleural effusion as outpatient and sent to ER for further management by PMD. Underwent IR thoracentesis with removal of 1350ml of fluid. Evaluated by cardiology, echocardiogram with LVSF of 50-55% with grade 1 diastolic dysfunction. To continue bb and entresto. Repeat chest xray with worsening pulmonary edema and effusions.     Assessment and Plan:     1. Acute on chronic diastolic  heart failure.   Status post right thoracentesis. Follow up chest xray now with worsening effusions and pulmonary edema.   Status post HD yesterday with improvement on chest xray  On Entresto  Echocardiogram reviewed      2. Acute hypoxic respiratory failure on admission secondary to  Pleural effusion due to congestive heart failure.  Status post thoracentesis: Now resolving.   titrate off oxygen as needed    3. End stage renal disease on dialysis with hyperkalemia:  HD MWF      4. Anemia due to chronic kidney disease, on chronic dialysis.  Continue epogen and ferrous sulfate    5.  Type 2 diabetes mellitus with complication, with long-term current use of insulin. : Decrease lantus to 5 units at bedtime. Continue HSS monitor bsl.     Hep C ab negative. Spoke with renal, HD today prior to discharge. Discussed with SW as well

## 2018-11-28 NOTE — PROGRESS NOTE ADULT - SUBJECTIVE AND OBJECTIVE BOX
CARDIOLOGY PROGRESS NOTE   ([facility])                                                                                                        Subjective:  no new c/o. NAD. denied CP.     Vitals:  T(C): 36.7 (11-22-18 @ 16:08), Max: 36.7 (11-22-18 @ 16:08)  HR: 84 (11-22-18 @ 16:08) (68 - 84)  BP: 126/82 (11-22-18 @ 16:08) (123/73 - 150/78)  RR: 18 (11-22-18 @ 16:08) (16 - 19)  SpO2: 98% (11-22-18 @ 16:08) (84% - 98%)  Wt(kg): --  I&O's Summary    21 Nov 2018 07:01  -  22 Nov 2018 07:00  --------------------------------------------------------  IN: 0 mL / OUT: 2000 mL / NET: -2000 mL    22 Nov 2018 07:01  -  22 Nov 2018 17:46  --------------------------------------------------------  IN: 360 mL / OUT: 0 mL / NET: 360 mL      Height (cm): 165.1 (11-21 @ 20:20)  Weight (kg): 62.5 (11-21 @ 20:20)  BMI (kg/m2): 22.9 (11-21 @ 20:20)  BSA (m2): 1.69 (11-21 @ 20:20)    PHYSICAL EXAM:  Appearance: Normal	  HEENT:   Atraumatic  Cardiovascular: Normal S1 S2, No JVD, No murmurs, No edema  Respiratory: Lungs clear to auscultation	  Gastrointestinal:  Soft, Non-tender, + BS	  Skin: No rashes, No ecchymoses, No cyanosis  Neurologic: Alert and awake, able to move extremities  Extremities: No edema    TELEMETRY: 	                CURRENT MEDICATIONS:  carvedilol 12.5 milliGRAM(s) Oral every 12 hours  sacubitril 97 mG/valsartan 103 mG 1 Tablet(s) Oral two times a day      ALBUTerol/ipratropium for Nebulization 3 milliLiter(s) Nebulizer every 6 hours PRN    acetaminophen   Tablet .. 650 milliGRAM(s) Oral every 6 hours PRN      dextrose 40% Gel 15 Gram(s) Oral once PRN  dextrose 50% Injectable 12.5 Gram(s) IV Push once  dextrose 50% Injectable 25 Gram(s) IV Push once  dextrose 50% Injectable 25 Gram(s) IV Push once  glucagon  Injectable 1 milliGRAM(s) IntraMuscular once PRN  insulin glargine Injectable (LANTUS) 10 Unit(s) SubCutaneous at bedtime  insulin lispro (HumaLOG) corrective regimen sliding scale   SubCutaneous three times a day before meals    aspirin enteric coated 81 milliGRAM(s) Oral daily  calcitriol   Capsule 0.25 MICROGram(s) Oral daily  calcium carbonate   1250 mG (OsCal) 1 Tablet(s) Oral three times a day  dextrose 5%. 1000 milliLiter(s) IV Continuous <Continuous>  ferrous    sulfate 325 milliGRAM(s) Oral daily      LABS:	 	                            8.1    4.2   )-----------( 250      ( 21 Nov 2018 10:29 )             27.7     11-21    138  |  94<L>  |  28.0<H>  ----------------------------<  268<H>  3.9   |  31.0<H>  |  2.74<H>    Ca    9.3      21 Nov 2018 10:29    TPro  7.8  /  Alb  3.7  /  TBili  1.1  /  DBili  x   /  AST  13  /  ALT  <5  /  AlkPhos  103  11-21
CC: Follow up for pleural effusion    INTERVAL HPI/OVERNIGHT EVENTS: Patient seen and examined with [facility]  service. Laying comfortable in bed. No acute complaints overnight. Denies, sob, chest pain or palpitations       Vital Signs Last 24 Hrs  T(C): 36.8 (28 Nov 2018 05:00), Max: 37.3 (27 Nov 2018 11:42)  T(F): 98.3 (28 Nov 2018 05:00), Max: 99.1 (27 Nov 2018 11:42)  HR: 67 (28 Nov 2018 05:00) (67 - 70)  BP: 157/82 (28 Nov 2018 05:00) (136/69 - 157/82)  BP(mean): --  RR: 18 (28 Nov 2018 05:00) (17 - 18)  SpO2: 98% (27 Nov 2018 21:56) (98% - 98%)    PHYSICAL EXAM:    GENERAL: NAD, AOX3  HEAD:  Atraumatic, Normocephalic  CHEST/LUNG: Clear to auscultation bilaterally; No rales, rhonchi, wheezing, or rubs  HEART: Regular rate and rhythm; No murmurs, rubs, or gallops  ABDOMEN: Soft, Nontender, Nondistended; Bowel sounds present  EXTREMITIES:  2+ Peripheral Pulses, No clubbing, cyanosis, or edema        MEDICATIONS  (STANDING):  aspirin enteric coated 81 milliGRAM(s) Oral daily  calcitriol   Capsule 0.25 MICROGram(s) Oral daily  calcium carbonate   1250 mG (OsCal) 1 Tablet(s) Oral three times a day  carvedilol 12.5 milliGRAM(s) Oral every 12 hours  chlorhexidine 2% Cloths 1 Application(s) Topical daily  dextrose 5%. 1000 milliLiter(s) (50 mL/Hr) IV Continuous <Continuous>  dextrose 50% Injectable 12.5 Gram(s) IV Push once  dextrose 50% Injectable 25 Gram(s) IV Push once  dextrose 50% Injectable 25 Gram(s) IV Push once  epoetin gian Injectable 75852 Unit(s) IV Push <User Schedule>  ferrous    sulfate 325 milliGRAM(s) Oral daily  insulin glargine Injectable (LANTUS) 5 Unit(s) SubCutaneous at bedtime  insulin lispro (HumaLOG) corrective regimen sliding scale   SubCutaneous three times a day before meals  sacubitril 97 mG/valsartan 103 mG 1 Tablet(s) Oral two times a day    MEDICATIONS  (PRN):  acetaminophen   Tablet .. 650 milliGRAM(s) Oral every 6 hours PRN Moderate Pain (4 - 6)  ALBUTerol/ipratropium for Nebulization 3 milliLiter(s) Nebulizer every 6 hours PRN Shortness of Breath and/or Wheezing  dextrose 40% Gel 15 Gram(s) Oral once PRN Blood Glucose LESS THAN 70 milliGRAM(s)/deciliter  glucagon  Injectable 1 milliGRAM(s) IntraMuscular once PRN Glucose LESS THAN 70 milligrams/deciliter      Allergies    No Known Allergies    Intolerances          LABS:                          7.7    3.5   )-----------( x        ( 28 Nov 2018 07:52 )             25.3     11-28    131<L>  |  95<L>  |  44.0<H>  ----------------------------<  96  5.4<H>   |  24.0  |  4.93<H>    Ca    9.5      28 Nov 2018 07:52  Phos  2.8     11-28            RADIOLOGY & ADDITIONAL TESTS:
CC: Follow up for pleural effusion    INTERVAL HPI/OVERNIGHT EVENTS: Patient seen and examined, no acute events overnight. Denies sob, chest pain or palpitations. HD yesterday; follow up chest xray done this morning.       Vital Signs Last 24 Hrs  T(C): 37.3 (27 Nov 2018 11:42), Max: 37.3 (27 Nov 2018 11:42)  T(F): 99.1 (27 Nov 2018 11:42), Max: 99.1 (27 Nov 2018 11:42)  HR: 68 (27 Nov 2018 11:42) (62 - 69)  BP: 147/74 (27 Nov 2018 11:42) (132/73 - 159/84)  BP(mean): --  RR: 17 (27 Nov 2018 11:42) (17 - 18)  SpO2: 98% (26 Nov 2018 20:41) (97% - 99%)    PHYSICAL EXAM:    GENERAL: NAD, AOX3  EYES: EOMI, PERRLA, conjunctiva and sclera clear  ENMT: Moist mucous membranes  CHEST/LUNG: Clear to auscultation bilaterally; No rales, rhonchi, wheezing, or rubs  HEART: Regular rate and rhythm; No murmurs, rubs, or gallops  ABDOMEN: Soft, Nontender, Nondistended; Bowel sounds present  EXTREMITIES:  2+ Peripheral Pulses, No clubbing, cyanosis, or edema        MEDICATIONS  (STANDING):  aspirin enteric coated 81 milliGRAM(s) Oral daily  calcitriol   Capsule 0.25 MICROGram(s) Oral daily  calcium carbonate   1250 mG (OsCal) 1 Tablet(s) Oral three times a day  carvedilol 12.5 milliGRAM(s) Oral every 12 hours  chlorhexidine 2% Cloths 1 Application(s) Topical daily  dextrose 5%. 1000 milliLiter(s) (50 mL/Hr) IV Continuous <Continuous>  dextrose 50% Injectable 12.5 Gram(s) IV Push once  dextrose 50% Injectable 25 Gram(s) IV Push once  dextrose 50% Injectable 25 Gram(s) IV Push once  epoetin gian Injectable 05117 Unit(s) IV Push <User Schedule>  ferrous    sulfate 325 milliGRAM(s) Oral daily  insulin glargine Injectable (LANTUS) 5 Unit(s) SubCutaneous at bedtime  insulin lispro (HumaLOG) corrective regimen sliding scale   SubCutaneous three times a day before meals  sacubitril 97 mG/valsartan 103 mG 1 Tablet(s) Oral two times a day    MEDICATIONS  (PRN):  acetaminophen   Tablet .. 650 milliGRAM(s) Oral every 6 hours PRN Moderate Pain (4 - 6)  ALBUTerol/ipratropium for Nebulization 3 milliLiter(s) Nebulizer every 6 hours PRN Shortness of Breath and/or Wheezing  dextrose 40% Gel 15 Gram(s) Oral once PRN Blood Glucose LESS THAN 70 milliGRAM(s)/deciliter  glucagon  Injectable 1 milliGRAM(s) IntraMuscular once PRN Glucose LESS THAN 70 milligrams/deciliter      Allergies    No Known Allergies    Intolerances          LABS:                          7.4    3.6   )-----------( 266      ( 26 Nov 2018 06:00 )             24.6                 RADIOLOGY & ADDITIONAL TESTS:
CC: Follow up for pleural effusion    INTERVAL HPI/OVERNIGHT EVENTS: Patient seen and examined, sitting up comfortably in bed. Denies sob, chest pain or palpitations.       Vital Signs Last 24 Hrs  T(C): 36.7 (25 Nov 2018 05:00), Max: 37 (24 Nov 2018 21:03)  T(F): 98 (25 Nov 2018 05:00), Max: 98.6 (24 Nov 2018 21:03)  HR: 66 (25 Nov 2018 05:00) (65 - 73)  BP: 138/78 (25 Nov 2018 05:00) (131/61 - 152/75)  BP(mean): --  RR: 17 (25 Nov 2018 05:00) (16 - 18)  SpO2: 100% (25 Nov 2018 05:00) (92% - 100%)    PHYSICAL EXAM:    GENERAL: NAD, AOX3  HEAD:  Atraumatic, Normocephalic  EYES: EOMI, PERRLA, conjunctiva and sclera clear  ENMT: Moist mucous membranes  CHEST/LUNG: Bibasilar crackles   HEART: Regular rate and rhythm; No murmurs, rubs, or gallops  ABDOMEN: Soft, Nontender, Nondistended; Bowel sounds present  EXTREMITIES:  2+ Peripheral Pulses, No clubbing, cyanosis, or edema        MEDICATIONS  (STANDING):  aspirin enteric coated 81 milliGRAM(s) Oral daily  calcitriol   Capsule 0.25 MICROGram(s) Oral daily  calcium carbonate   1250 mG (OsCal) 1 Tablet(s) Oral three times a day  carvedilol 12.5 milliGRAM(s) Oral every 12 hours  dextrose 5%. 1000 milliLiter(s) (50 mL/Hr) IV Continuous <Continuous>  dextrose 50% Injectable 12.5 Gram(s) IV Push once  dextrose 50% Injectable 25 Gram(s) IV Push once  dextrose 50% Injectable 25 Gram(s) IV Push once  epoetin gian Injectable 68091 Unit(s) IV Push <User Schedule>  ferrous    sulfate 325 milliGRAM(s) Oral daily  insulin glargine Injectable (LANTUS) 10 Unit(s) SubCutaneous at bedtime  insulin lispro (HumaLOG) corrective regimen sliding scale   SubCutaneous three times a day before meals  sacubitril 97 mG/valsartan 103 mG 1 Tablet(s) Oral two times a day    MEDICATIONS  (PRN):  acetaminophen   Tablet .. 650 milliGRAM(s) Oral every 6 hours PRN Moderate Pain (4 - 6)  ALBUTerol/ipratropium for Nebulization 3 milliLiter(s) Nebulizer every 6 hours PRN Shortness of Breath and/or Wheezing  dextrose 40% Gel 15 Gram(s) Oral once PRN Blood Glucose LESS THAN 70 milliGRAM(s)/deciliter  glucagon  Injectable 1 milliGRAM(s) IntraMuscular once PRN Glucose LESS THAN 70 milligrams/deciliter      Allergies    No Known Allergies    Intolerances          LABS:                          7.1    3.8   )-----------( 256      ( 25 Nov 2018 06:02 )             24.3     11-25    134<L>  |  97<L>  |  53.0<H>  ----------------------------<  87  5.1   |  27.0  |  4.85<H>    Ca    9.0      25 Nov 2018 06:02  Phos  3.8     11-24            RADIOLOGY & ADDITIONAL TESTS:
CC: SOB    INTERVAL HPI/OVERNIGHT EVENTS: Patient seen and examined, laying comfortably in bed. Denies sob, chest pain or palpitations.       Vital Signs Last 24 Hrs  T(C): 36.9 (24 Nov 2018 10:48), Max: 37.2 (23 Nov 2018 21:39)  T(F): 98.4 (24 Nov 2018 10:48), Max: 98.9 (23 Nov 2018 21:39)  HR: 69 (24 Nov 2018 10:48) (65 - 76)  BP: 131/61 (24 Nov 2018 10:48) (131/61 - 146/76)  BP(mean): --  RR: 16 (24 Nov 2018 10:48) (16 - 18)  SpO2: 92% (24 Nov 2018 10:48) (92% - 100%)    PHYSICAL EXAM:    GENERAL: NAD, AOX3  HEAD:  Atraumatic, Normocephalic  EYES: EOMI, PERRLA, conjunctiva and sclera clear  ENMT: Moist mucous membranes  CHEST/LUNG: Clear to auscultation bilaterally; No rales, rhonchi, wheezing, or rubs  HEART: Regular rate and rhythm; No murmurs, rubs, or gallops  ABDOMEN: Soft, Nontender, Nondistended; Bowel sounds present  EXTREMITIES:  2+ Peripheral Pulses, No clubbing, cyanosis, or edema        MEDICATIONS  (STANDING):  aspirin enteric coated 81 milliGRAM(s) Oral daily  calcitriol   Capsule 0.25 MICROGram(s) Oral daily  calcium carbonate   1250 mG (OsCal) 1 Tablet(s) Oral three times a day  carvedilol 12.5 milliGRAM(s) Oral every 12 hours  dextrose 5%. 1000 milliLiter(s) (50 mL/Hr) IV Continuous <Continuous>  dextrose 50% Injectable 12.5 Gram(s) IV Push once  dextrose 50% Injectable 25 Gram(s) IV Push once  dextrose 50% Injectable 25 Gram(s) IV Push once  epoetin gian Injectable 22867 Unit(s) IV Push <User Schedule>  ferrous    sulfate 325 milliGRAM(s) Oral daily  insulin glargine Injectable (LANTUS) 10 Unit(s) SubCutaneous at bedtime  insulin lispro (HumaLOG) corrective regimen sliding scale   SubCutaneous three times a day before meals  sacubitril 97 mG/valsartan 103 mG 1 Tablet(s) Oral two times a day    MEDICATIONS  (PRN):  acetaminophen   Tablet .. 650 milliGRAM(s) Oral every 6 hours PRN Moderate Pain (4 - 6)  ALBUTerol/ipratropium for Nebulization 3 milliLiter(s) Nebulizer every 6 hours PRN Shortness of Breath and/or Wheezing  dextrose 40% Gel 15 Gram(s) Oral once PRN Blood Glucose LESS THAN 70 milliGRAM(s)/deciliter  glucagon  Injectable 1 milliGRAM(s) IntraMuscular once PRN Glucose LESS THAN 70 milligrams/deciliter      Allergies    No Known Allergies    Intolerances          LABS:                          7.0    4.3   )-----------( 255      ( 24 Nov 2018 09:51 )             23.4     11-24    133<L>  |  90<L>  |  67.0<H>  ----------------------------<  145<H>  5.5<H>   |  27.0  |  5.77<H>    Ca    9.6      24 Nov 2018 09:51  Phos  3.8     11-24            RADIOLOGY & ADDITIONAL TESTS:
[facility]                                                        Office: [address] [place] [address]                                                       Telephone: [phone]. Fax:[phone]      CARDIOLOGY PROGRESS NOTE   ([facility])    Subjective: Patient seen and examined earlier this morning.  Feels well overall.  Eating sunflower seeds at bedside.  Increased pulmonary edema on CXR.  No orthopnea.     ROS: No headache, no chest pain, no SOB, no palpitations, no dizziness, no nausea, no bleeding    Chronic Conditions:     CURRENT MEDICATIONS  carvedilol 12.5 milliGRAM(s) Oral every 12 hours  sacubitril 97 mG/valsartan 103 mG 1 Tablet(s) Oral two times a day      ALBUTerol/ipratropium for Nebulization 3 milliLiter(s) Nebulizer every 6 hours PRN    acetaminophen   Tablet .. 650 milliGRAM(s) Oral every 6 hours PRN      dextrose 40% Gel 15 Gram(s) Oral once PRN  dextrose 50% Injectable 12.5 Gram(s) IV Push once  dextrose 50% Injectable 25 Gram(s) IV Push once  dextrose 50% Injectable 25 Gram(s) IV Push once  glucagon  Injectable 1 milliGRAM(s) IntraMuscular once PRN  insulin glargine Injectable (LANTUS) 5 Unit(s) SubCutaneous at bedtime  insulin lispro (HumaLOG) corrective regimen sliding scale   SubCutaneous three times a day before meals    aspirin enteric coated 81 milliGRAM(s) Oral daily  calcitriol   Capsule 0.25 MICROGram(s) Oral daily  calcium carbonate   1250 mG (OsCal) 1 Tablet(s) Oral three times a day  chlorhexidine 2% Cloths 1 Application(s) Topical daily  dextrose 5%. 1000 milliLiter(s) IV Continuous <Continuous>  epoetin gian Injectable 87841 Unit(s) IV Push <User Schedule>  ferrous    sulfate 325 milliGRAM(s) Oral daily    	  TELEMETRY:   Vitals:  T(C): 36.3 (11-26-18 @ 09:50), Max: 36.9 (11-25-18 @ 21:06)  HR: 68 (11-26-18 @ 09:50) (68 - 93)  BP: 124/76 (11-26-18 @ 09:50) (124/76 - 146/72)  RR: 16 (11-26-18 @ 09:50) (16 - 18)  SpO2: 97% (11-26-18 @ 09:50) (95% - 97%)  Wt(kg): --  I&O's Summary    25 Nov 2018 07:01  -  26 Nov 2018 07:00  --------------------------------------------------------  IN: 720 mL / OUT: 0 mL / NET: 720 mL        Daily T(C): 36.3 (11-26-18 @ 09:50), Max: 36.9 (11-25-18 @ 21:06)  HR: 68 (11-26-18 @ 09:50) (68 - 93)  BP: 124/76 (11-26-18 @ 09:50) (124/76 - 146/72)  RR: 16 (11-26-18 @ 09:50) (16 - 18)  SpO2: 97% (11-26-18 @ 09:50) (95% - 97%)  Wt(kg): --    Daily     PHYSICAL EXAM:  Appearance: Normal, NAD	  HEENT:   Normal oral mucosa, PERRL, EOMI	  Lymphatic: No lymphadenopathy  Cardiovascular: Normal S1 S2, No JVD, No murmurs, No edema  Respiratory: bibasilar crackles  Psychiatry: A & O x 3, Mood & affect appropriate  Gastrointestinal:  Soft, Non-tender, + BS	  Skin: No rashes, No ecchymoses, No cyanosis  Neurologic: Non-focal  Extremities: Normal range of motion, No clubbing, cyanosis or edema  Vascular: Peripheral pulses palpable 2+ bilaterally, warm      ECG (tracing reviewed by me):  	    DIAGNOSTIC TESTING:  Echocardiogram (images reviewed by me): < from: TTE Echo Complete w/Doppler (11.22.18 @ 14:12) >    Summary:   1. Left ventricular ejection fraction, byvisual estimation, is 50 to   55%.   2. Technically good study.   3. Normal global left ventricular systolic function.   4. LV Ejection Fraction by Cabrera's Method with a biplane EF of 56 %.   5. Normal left ventricular internal cavity size.   6. Spectral Doppler shows impaired relaxation pattern of left   ventricular myocardial filling (Grade I diastolic dysfunction).   7. There is mild concentric left ventricular hypertrophy.   8. Normal left atrial size.   9. Thickening of the anterior mitralvalve leaflet.  10. Mild tricuspid regurgitation.  11. Sclerotic aortic valve with normal opening.  12. Lambl excrescence noted.  13. Pulmonic valve regurgitation.    LV Ejection Fraction by Cabrera's Method with a biplane EF of 56 %.     [id number] José Miguel Osuna MD, Electronically signed on 11/22/2018 at 3:02:07 PM    < end of copied text >    Catheterization:  Stress Test:    CXR (image reviewed by me):  OTHER: 	    LABS:	 	  CARDIAC MARKERS:                                  7.4    3.6   )-----------( 266      ( 26 Nov 2018 06:00 )             24.6     11-25    134<L>  |  97<L>  |  53.0<H>  ----------------------------<  87  5.1   |  27.0  |  4.85<H>    Ca    9.0      25 Nov 2018 06:02      BNP:   Lipid Profile:   HgA1c:   TSH:
JAROCHO MORALES    [id number]    58y      Female    CC: sob    Overnight events:  was seen and examined at bedside. Denied active complains. was scheduled for IR guided chest tube placement      Vital Signs Last 24 Hrs  T(C): 36.5 (23 Nov 2018 08:48), Max: 36.9 (22 Nov 2018 21:36)  T(F): 97.7 (23 Nov 2018 08:48), Max: 98.5 (22 Nov 2018 21:36)  HR: 70 (23 Nov 2018 12:28) (67 - 84)  BP: 146/70 (23 Nov 2018 12:28) (126/82 - 154/68)  BP(mean): --  RR: 16 (23 Nov 2018 12:28) (16 - 18)  SpO2: 93% (23 Nov 2018 12:28) (91% - 98%)    PHYSICAL EXAM:    GENERAL: NAD, AV fistula on left arm  CHEST/LUNG: no bs right base 1/2 chest wall, clear left side   HEART: Regular rate and rhythm; S1 S2  ABDOMEN: Soft, Nontender, Nondistended; Bowel sounds present  EXTREMITIES:  no edema  NERVOUS SYSTEM:  Alert & Oriented X3, nonfocal          MEDICATIONS  (STANDING):  aspirin enteric coated 81 milliGRAM(s) Oral daily  calcitriol   Capsule 0.25 MICROGram(s) Oral daily  calcium carbonate   1250 mG (OsCal) 1 Tablet(s) Oral three times a day  carvedilol 12.5 milliGRAM(s) Oral every 12 hours  dextrose 5%. 1000 milliLiter(s) (50 mL/Hr) IV Continuous <Continuous>  dextrose 50% Injectable 12.5 Gram(s) IV Push once  dextrose 50% Injectable 25 Gram(s) IV Push once  dextrose 50% Injectable 25 Gram(s) IV Push once  ferrous    sulfate 325 milliGRAM(s) Oral daily  insulin glargine Injectable (LANTUS) 10 Unit(s) SubCutaneous at bedtime  insulin lispro (HumaLOG) corrective regimen sliding scale   SubCutaneous three times a day before meals  sacubitril 97 mG/valsartan 103 mG 1 Tablet(s) Oral two times a day    MEDICATIONS  (PRN):  ALBUTerol/ipratropium for Nebulization 3 milliLiter(s) Nebulizer every 6 hours PRN Shortness of Breath and/or Wheezing  dextrose 40% Gel 15 Gram(s) Oral once PRN Blood Glucose LESS THAN 70 milliGRAM(s)/deciliter  glucagon  Injectable 1 milliGRAM(s) IntraMuscular once PRN Glucose LESS THAN 70 milligrams/deciliter        LABS:                        7.9    4.4   )-----------( 274      ( 23 Nov 2018 06:21 )             26.7     11-23    138  |  94<L>  |  41.0<H>  ----------------------------<  65<L>  4.6   |  29.0  |  3.85<H>    Ca    9.7      23 Nov 2018 06:21  Phos  3.6     11-23              MEDICATIONS  (STANDING):  aspirin enteric coated 81 milliGRAM(s) Oral daily  calcitriol   Capsule 0.25 MICROGram(s) Oral daily  calcium carbonate   1250 mG (OsCal) 1 Tablet(s) Oral three times a day  carvedilol 12.5 milliGRAM(s) Oral every 12 hours  dextrose 5%. 1000 milliLiter(s) (50 mL/Hr) IV Continuous <Continuous>  dextrose 50% Injectable 12.5 Gram(s) IV Push once  dextrose 50% Injectable 25 Gram(s) IV Push once  dextrose 50% Injectable 25 Gram(s) IV Push once  ferrous    sulfate 325 milliGRAM(s) Oral daily  insulin glargine Injectable (LANTUS) 10 Unit(s) SubCutaneous at bedtime  insulin lispro (HumaLOG) corrective regimen sliding scale   SubCutaneous three times a day before meals  sacubitril 97 mG/valsartan 103 mG 1 Tablet(s) Oral two times a day    MEDICATIONS  (PRN):  acetaminophen   Tablet .. 650 milliGRAM(s) Oral every 6 hours PRN Moderate Pain (4 - 6)  ALBUTerol/ipratropium for Nebulization 3 milliLiter(s) Nebulizer every 6 hours PRN Shortness of Breath and/or Wheezing  dextrose 40% Gel 15 Gram(s) Oral once PRN Blood Glucose LESS THAN 70 milliGRAM(s)/deciliter  glucagon  Injectable 1 milliGRAM(s) IntraMuscular once PRN Glucose LESS THAN 70 milligrams/deciliter      RADIOLOGY & ADDITIONAL TESTS:
NEPHROLOGY INTERVAL HPI/OVERNIGHT EVENTS:      Feels better with additional fluid removal w/ HD   I got called by outpt HD , She had a + hep C Ab -----> New   Much less SOB         MEDICATIONS  (STANDING):  aspirin enteric coated 81 milliGRAM(s) Oral daily  calcitriol   Capsule 0.25 MICROGram(s) Oral daily  calcium carbonate   1250 mG (OsCal) 1 Tablet(s) Oral three times a day  carvedilol 12.5 milliGRAM(s) Oral every 12 hours  chlorhexidine 2% Cloths 1 Application(s) Topical daily  dextrose 5%. 1000 milliLiter(s) (50 mL/Hr) IV Continuous <Continuous>  dextrose 50% Injectable 12.5 Gram(s) IV Push once  dextrose 50% Injectable 25 Gram(s) IV Push once  dextrose 50% Injectable 25 Gram(s) IV Push once  epoetin gian Injectable 05274 Unit(s) IV Push <User Schedule>  ferrous    sulfate 325 milliGRAM(s) Oral daily  insulin glargine Injectable (LANTUS) 5 Unit(s) SubCutaneous at bedtime  insulin lispro (HumaLOG) corrective regimen sliding scale   SubCutaneous three times a day before meals  sacubitril 97 mG/valsartan 103 mG 1 Tablet(s) Oral two times a day    MEDICATIONS  (PRN):  acetaminophen   Tablet .. 650 milliGRAM(s) Oral every 6 hours PRN Moderate Pain (4 - 6)  ALBUTerol/ipratropium for Nebulization 3 milliLiter(s) Nebulizer every 6 hours PRN Shortness of Breath and/or Wheezing  dextrose 40% Gel 15 Gram(s) Oral once PRN Blood Glucose LESS THAN 70 milliGRAM(s)/deciliter  glucagon  Injectable 1 milliGRAM(s) IntraMuscular once PRN Glucose LESS THAN 70 milligrams/deciliter      Allergies    No Known Allergies    Intolerances        Vital Signs Last 24 Hrs  T(C): 37.2 (2018 04:15), Max: 37.2 (2018 04:15)  T(F): 98.9 (2018 04:15), Max: 98.9 (2018 04:15)  HR: 67 (2018 04:15) (62 - 69)  BP: 132/73 (2018 04:15) (132/73 - 159/84)  BP(mean): --  RR: 18 (2018 04:15) (18 - 18)  SpO2: 98% (2018 20:41) (97% - 99%)  Daily     Daily Weight in k.9 (2018 19:40)  I&O's Detail    2018 07:  -  2018 07:00  --------------------------------------------------------  IN:    Oral Fluid: 480 mL  Total IN: 480 mL    OUT:  Total OUT: 0 mL    Total NET: 480 mL        I&O's Summary    2018 07:  -  2018 07:00  --------------------------------------------------------  IN: 480 mL / OUT: 0 mL / NET: 480 mL        PHYSICAL EXAM:  GENERAL: Thin, frail  HEAD:  NCAT  NECK: Supple, +JVD  NERVOUS SYSTEM:  Alert & Oriented X3  CHEST/LUNG: Improved aeration   HEART: Regular rate and rhythm; No rub  ABDOMEN: Soft, Nontender, Nondistended; Bowel sounds present  EXTREMITIES:  trace LE edema    LABS:                        7.4    3.6   )-----------( 266      ( 2018 06:00 )             24.6                       RADIOLOGY & ADDITIONAL TESTS:
NEPHROLOGY INTERVAL HPI/OVERNIGHT EVENTS:      NO new events.    MEDICATIONS  (STANDING):  aspirin enteric coated 81 milliGRAM(s) Oral daily  calcitriol   Capsule 0.25 MICROGram(s) Oral daily  calcium carbonate   1250 mG (OsCal) 1 Tablet(s) Oral three times a day  carvedilol 12.5 milliGRAM(s) Oral every 12 hours  chlorhexidine 2% Cloths 1 Application(s) Topical daily  dextrose 5%. 1000 milliLiter(s) (50 mL/Hr) IV Continuous <Continuous>  dextrose 50% Injectable 12.5 Gram(s) IV Push once  dextrose 50% Injectable 25 Gram(s) IV Push once  dextrose 50% Injectable 25 Gram(s) IV Push once  epoetin gian Injectable 09031 Unit(s) IV Push <User Schedule>  ferrous    sulfate 325 milliGRAM(s) Oral daily  insulin glargine Injectable (LANTUS) 5 Unit(s) SubCutaneous at bedtime  insulin lispro (HumaLOG) corrective regimen sliding scale   SubCutaneous three times a day before meals  sacubitril 97 mG/valsartan 103 mG 1 Tablet(s) Oral two times a day    MEDICATIONS  (PRN):  acetaminophen   Tablet .. 650 milliGRAM(s) Oral every 6 hours PRN Moderate Pain (4 - 6)  ALBUTerol/ipratropium for Nebulization 3 milliLiter(s) Nebulizer every 6 hours PRN Shortness of Breath and/or Wheezing  dextrose 40% Gel 15 Gram(s) Oral once PRN Blood Glucose LESS THAN 70 milliGRAM(s)/deciliter  glucagon  Injectable 1 milliGRAM(s) IntraMuscular once PRN Glucose LESS THAN 70 milligrams/deciliter      Allergies    No Known Allergies    Intolerances        Vital Signs Last 24 Hrs    T(C): 36.8 (28 Nov 2018 05:00), Max: 37.3 (27 Nov 2018 11:42)  T(F): 98.3 (28 Nov 2018 05:00), Max: 99.1 (27 Nov 2018 11:42)  HR: 67 (28 Nov 2018 05:00) (67 - 70)  BP: 157/82 (28 Nov 2018 05:00) (136/69 - 157/82)  BP(mean): --  RR: 18 (28 Nov 2018 05:00) (17 - 18)  SpO2: 98% (27 Nov 2018 21:56) (98% - 98%)  T(C): 37.2 (27 Nov 2018 04:15), Max: 37.2 (27 Nov 2018 04:15)  T(F): 98.9 (27 Nov 2018 04:15), Max: 98.9 (27 Nov 2018 04:15)  HR: 67 (27 Nov 2018 04:15) (62 - 69)  BP: 132/73 (27 Nov 2018 04:15) (132/73 - 159/84)  BP(mean): --  RR: 18 (27 Nov 2018 04:15) (18 - 18)  SpO2: 98% (26 Nov 2018 20:41) (97% - 99%)          PHYSICAL EXAM:  GENERAL: Appears chronically ill  HEAD:  NCAT  NECK: Supple, +JVD  NERVOUS SYSTEM:  Alert & Oriented X3  CHEST/LUNG: Nasasl 02, rales bases   HEART: Regular rate and rhythm; No rub  ABDOMEN: Soft, Nontender, Nondistended; Bowel sounds present  EXTREMITIES:  No edema    LABS: 11-28    131<L>  |  95<L>  |  44.0<H>  ----------------------------<  96  5.4<H>   |  24.0  |  4.93<H>    Ca    9.5      28 Nov 2018 07:52  Phos  2.8     11-28                            7.4    3.6   )-----------( 266      ( 26 Nov 2018 06:00 )             24.6                       RADIOLOGY & ADDITIONAL TESTS:
NEPHROLOGY INTERVAL HPI/OVERNIGHT EVENTS:    Feels better   + PVC on CXR   Afebrile        MEDICATIONS  (STANDING):  aspirin enteric coated 81 milliGRAM(s) Oral daily  calcitriol   Capsule 0.25 MICROGram(s) Oral daily  calcium carbonate   1250 mG (OsCal) 1 Tablet(s) Oral three times a day  carvedilol 12.5 milliGRAM(s) Oral every 12 hours  chlorhexidine 2% Cloths 1 Application(s) Topical daily  dextrose 5%. 1000 milliLiter(s) (50 mL/Hr) IV Continuous <Continuous>  dextrose 50% Injectable 12.5 Gram(s) IV Push once  dextrose 50% Injectable 25 Gram(s) IV Push once  dextrose 50% Injectable 25 Gram(s) IV Push once  epoetin gian Injectable 08889 Unit(s) IV Push <User Schedule>  ferrous    sulfate 325 milliGRAM(s) Oral daily  insulin glargine Injectable (LANTUS) 5 Unit(s) SubCutaneous at bedtime  insulin lispro (HumaLOG) corrective regimen sliding scale   SubCutaneous three times a day before meals  sacubitril 97 mG/valsartan 103 mG 1 Tablet(s) Oral two times a day    MEDICATIONS  (PRN):  acetaminophen   Tablet .. 650 milliGRAM(s) Oral every 6 hours PRN Moderate Pain (4 - 6)  ALBUTerol/ipratropium for Nebulization 3 milliLiter(s) Nebulizer every 6 hours PRN Shortness of Breath and/or Wheezing  dextrose 40% Gel 15 Gram(s) Oral once PRN Blood Glucose LESS THAN 70 milliGRAM(s)/deciliter  glucagon  Injectable 1 milliGRAM(s) IntraMuscular once PRN Glucose LESS THAN 70 milligrams/deciliter      Allergies    No Known Allergies    Intolerances          Vital Signs Last 24 Hrs  T(C): 36.4 (2018 15:53), Max: 36.9 (2018 21:06)  T(F): 97.6 (2018 15:53), Max: 98.4 (2018 21:06)  HR: 62 (2018 15:53) (62 - 85)  BP: 147/79 (2018 15:53) (124/76 - 147/79)  BP(mean): --  RR: 18 (2018 15:53) (16 - 18)  SpO2: 97% (2018 15:53) (95% - 97%)  Daily     Daily Weight in k.4 (2018 05:54)  I&O's Detail      -  2018 07:00  --------------------------------------------------------  IN:    Oral Fluid: 720 mL  Total IN: 720 mL    OUT:  Total OUT: 0 mL    Total NET: 720 mL        -  2018 16:08  --------------------------------------------------------  IN:    Oral Fluid: 480 mL  Total IN: 480 mL    OUT:  Total OUT: 0 mL    Total NET: 480 mL        I&O's Summary    :  -  2018 07:00  --------------------------------------------------------  IN: 720 mL / OUT: 0 mL / NET: 720 mL      -  2018 16:08  --------------------------------------------------------  IN: 480 mL / OUT: 0 mL / NET: 480 mL        PHYSICAL EXAM:  GENERAL: Thin, frail  HEAD:  NCAT  NECK: Supple, +JVD  NERVOUS SYSTEM:  Alert & Oriented X3  CHEST/LUNG: Improved aeration   HEART: Regular rate and rhythm; No rub  ABDOMEN: Soft, Nontender, Nondistended; Bowel sounds present  EXTREMITIES:  trace LE edema    LABS:                        7.4    3.6   )-----------( 266      ( 2018 06:00 )             24.6     11-25    134<L>  |  97<L>  |  53.0<H>  ----------------------------<  87  5.1   |  27.0  |  4.85<H>    Ca    9.0      2018 06:02                  RADIOLOGY & ADDITIONAL TESTS:
NEPHROLOGY INTERVAL HPI/OVERNIGHT EVENTS:    Feels better   No new complaints   No new events     MEDICATIONS  (STANDING):  aspirin enteric coated 81 milliGRAM(s) Oral daily  calcitriol   Capsule 0.25 MICROGram(s) Oral daily  calcium carbonate   1250 mG (OsCal) 1 Tablet(s) Oral three times a day  carvedilol 12.5 milliGRAM(s) Oral every 12 hours  dextrose 5%. 1000 milliLiter(s) (50 mL/Hr) IV Continuous <Continuous>  dextrose 50% Injectable 12.5 Gram(s) IV Push once  dextrose 50% Injectable 25 Gram(s) IV Push once  dextrose 50% Injectable 25 Gram(s) IV Push once  epoetin gian Injectable 19436 Unit(s) IV Push <User Schedule>  ferrous    sulfate 325 milliGRAM(s) Oral daily  insulin glargine Injectable (LANTUS) 10 Unit(s) SubCutaneous at bedtime  insulin lispro (HumaLOG) corrective regimen sliding scale   SubCutaneous three times a day before meals  sacubitril 97 mG/valsartan 103 mG 1 Tablet(s) Oral two times a day    MEDICATIONS  (PRN):  acetaminophen   Tablet .. 650 milliGRAM(s) Oral every 6 hours PRN Moderate Pain (4 - 6)  ALBUTerol/ipratropium for Nebulization 3 milliLiter(s) Nebulizer every 6 hours PRN Shortness of Breath and/or Wheezing  dextrose 40% Gel 15 Gram(s) Oral once PRN Blood Glucose LESS THAN 70 milliGRAM(s)/deciliter  glucagon  Injectable 1 milliGRAM(s) IntraMuscular once PRN Glucose LESS THAN 70 milligrams/deciliter      Allergies    No Known Allergies    Intolerances          Vital Signs Last 24 Hrs  T(C): 36.7 (2018 11:47), Max: 37 (2018 21:03)  T(F): 98 (2018 11:47), Max: 98.6 (2018 21:03)  HR: 70 (2018 11:47) (66 - 73)  BP: 144/76 (2018 11:47) (138/78 - 152/75)  BP(mean): --  RR: 16 (2018 11:47) (16 - 18)  SpO2: 99% (2018 11:47) (96% - 100%)  Daily     Daily Weight in k.2 (2018 04:53)  I&O's Detail    2018 07:  -  2018 07:00  --------------------------------------------------------  IN:    Oral Fluid: 480 mL  Total IN: 480 mL    OUT:    Other: 2000 mL    Voided: 300 mL  Total OUT: 2300 mL    Total NET: -1820 mL        I&O's Summary    2018 07:  -  2018 07:00  --------------------------------------------------------  IN: 480 mL / OUT: 2300 mL / NET: -1820 mL        PHYSICAL EXAM:  GENERAL: Thin, frail  HEAD:  NCAT  NECK: Supple, +JVD  NERVOUS SYSTEM:  Alert & Oriented X3  CHEST/LUNG: Improved aeration   HEART: Regular rate and rhythm; No rub  ABDOMEN: Soft, Nontender, Nondistended; Bowel sounds present  EXTREMITIES:  trace LE edema      LABS:                        7.1    3.8   )-----------( 256      ( 2018 06:02 )             24.3     11-25    134<L>  |  97<L>  |  53.0<H>  ----------------------------<  87  5.1   |  27.0  |  4.85<H>    Ca    9.0      2018 06:02  Phos  3.8     11-24                  RADIOLOGY & ADDITIONAL TESTS:
NEPHROLOGY INTERVAL HPI/OVERNIGHT EVENTS:    Feels better post R thoracentesis   Cardi follow up noted   possible future Watchman     MEDICATIONS  (STANDING):  aspirin enteric coated 81 milliGRAM(s) Oral daily  calcitriol   Capsule 0.25 MICROGram(s) Oral daily  calcium carbonate   1250 mG (OsCal) 1 Tablet(s) Oral three times a day  carvedilol 12.5 milliGRAM(s) Oral every 12 hours  dextrose 5%. 1000 milliLiter(s) (50 mL/Hr) IV Continuous <Continuous>  dextrose 50% Injectable 12.5 Gram(s) IV Push once  dextrose 50% Injectable 25 Gram(s) IV Push once  dextrose 50% Injectable 25 Gram(s) IV Push once  ferrous    sulfate 325 milliGRAM(s) Oral daily  insulin glargine Injectable (LANTUS) 10 Unit(s) SubCutaneous at bedtime  insulin lispro (HumaLOG) corrective regimen sliding scale   SubCutaneous three times a day before meals  sacubitril 97 mG/valsartan 103 mG 1 Tablet(s) Oral two times a day    MEDICATIONS  (PRN):  acetaminophen   Tablet .. 650 milliGRAM(s) Oral every 6 hours PRN Moderate Pain (4 - 6)  ALBUTerol/ipratropium for Nebulization 3 milliLiter(s) Nebulizer every 6 hours PRN Shortness of Breath and/or Wheezing  dextrose 40% Gel 15 Gram(s) Oral once PRN Blood Glucose LESS THAN 70 milliGRAM(s)/deciliter  glucagon  Injectable 1 milliGRAM(s) IntraMuscular once PRN Glucose LESS THAN 70 milligrams/deciliter      Allergies    No Known Allergies    Intolerances          Vital Signs Last 24 Hrs  T(C): 36.5 (2018 08:48), Max: 36.9 (2018 21:36)  T(F): 97.7 (2018 08:48), Max: 98.5 (2018 21:36)  HR: 70 (2018 12:28) (67 - 84)  BP: 146/70 (2018 12:28) (126/82 - 154/68)  BP(mean): --  RR: 16 (2018 12:28) (16 - 18)  SpO2: 93% (2018 12:28) (91% - 98%)  Daily     Daily Weight in k.2 (2018 13:36)  I&O's Detail     07:00  --------------------------------------------------------  IN:    Oral Fluid: 540 mL  Total IN: 540 mL    OUT:  Total OUT: 0 mL    Total NET: 540 mL       15:34  --------------------------------------------------------  IN:    Oral Fluid: 120 mL  Total IN: 120 mL    OUT:  Total OUT: 0 mL    Total NET: 120 mL        I&O's Summary      -  2018 07:00  --------------------------------------------------------  IN: 540 mL / OUT: 0 mL / NET: 540 mL      -  2018 15:34  --------------------------------------------------------  IN: 120 mL / OUT: 0 mL / NET: 120 mL        PHYSICAL EXAM:  GENERAL: Thin, frail  HEAD:  NCAT  NECK: Supple, +JVD  NERVOUS SYSTEM:  Alert & Oriented X3  CHEST/LUNG: Clear with diminished BS (R > L)  HEART: Regular rate and rhythm; No rub  ABDOMEN: Soft, Nontender, Nondistended; Bowel sounds present  EXTREMITIES:  trace LE edema    LABS:                        7.9    4.4   )-----------( 274      ( 2018 06:21 )             26.7     11-23    138  |  94<L>  |  41.0<H>  ----------------------------<  65<L>  4.6   |  29.0  |  3.85<H>    Ca    9.7      2018 06:21  Phos  3.6               Phosphorus Level, Serum: 3.6 mg/dL ( @ 06:21)          RADIOLOGY & ADDITIONAL TESTS:
NEPHROLOGY INTERVAL HPI/OVERNIGHT EVENTS:    Seen earlier   no new events     MEDICATIONS  (STANDING):  aspirin enteric coated 81 milliGRAM(s) Oral daily  calcitriol   Capsule 0.25 MICROGram(s) Oral daily  calcium carbonate   1250 mG (OsCal) 1 Tablet(s) Oral three times a day  carvedilol 12.5 milliGRAM(s) Oral every 12 hours  dextrose 5%. 1000 milliLiter(s) (50 mL/Hr) IV Continuous <Continuous>  dextrose 50% Injectable 12.5 Gram(s) IV Push once  dextrose 50% Injectable 25 Gram(s) IV Push once  dextrose 50% Injectable 25 Gram(s) IV Push once  epoetin gian Injectable 10848 Unit(s) IV Push <User Schedule>  ferrous    sulfate 325 milliGRAM(s) Oral daily  insulin glargine Injectable (LANTUS) 10 Unit(s) SubCutaneous at bedtime  insulin lispro (HumaLOG) corrective regimen sliding scale   SubCutaneous three times a day before meals  sacubitril 97 mG/valsartan 103 mG 1 Tablet(s) Oral two times a day    MEDICATIONS  (PRN):  acetaminophen   Tablet .. 650 milliGRAM(s) Oral every 6 hours PRN Moderate Pain (4 - 6)  ALBUTerol/ipratropium for Nebulization 3 milliLiter(s) Nebulizer every 6 hours PRN Shortness of Breath and/or Wheezing  dextrose 40% Gel 15 Gram(s) Oral once PRN Blood Glucose LESS THAN 70 milliGRAM(s)/deciliter  glucagon  Injectable 1 milliGRAM(s) IntraMuscular once PRN Glucose LESS THAN 70 milligrams/deciliter      Allergies    No Known Allergies    Intolerances            Vital Signs Last 24 Hrs  T(C): 36.9 (2018 15:10), Max: 37.2 (2018 21:39)  T(F): 98.5 (2018 15:10), Max: 98.9 (2018 21:39)  HR: 72 (2018 15:10) (65 - 76)  BP: 152/75 (2018 15:10) (131/61 - 152/75)  BP(mean): --  RR: 18 (2018 15:10) (16 - 18)  SpO2: 96% (2018 15:10) (92% - 100%)  Daily     Daily Weight in k.4 (2018 10:05)  I&O's Detail    2018 07:  -  2018 07:00  --------------------------------------------------------  IN:    Oral Fluid: 180 mL  Total IN: 180 mL    OUT:    Voided: 400 mL  Total OUT: 400 mL    Total NET: -220 mL      2018 07:  -  2018 19:12  --------------------------------------------------------  IN:    Oral Fluid: 480 mL  Total IN: 480 mL    OUT:    Other: 2000 mL  Total OUT: 2000 mL    Total NET: -1520 mL        I&O's Summary    2018 07:  -  2018 07:00  --------------------------------------------------------  IN: 180 mL / OUT: 400 mL / NET: -220 mL    2018 07:  -  2018 19:12  --------------------------------------------------------  IN: 480 mL / OUT: 2000 mL / NET: -1520 mL        PHYSICAL EXAM:  GENERAL: Thin, frail  HEAD:  NCAT  NECK: Supple, +JVD  NERVOUS SYSTEM:  Alert & Oriented X3  CHEST/LUNG: Clear with diminished BS (R > L)  HEART: Regular rate and rhythm; No rub  ABDOMEN: Soft, Nontender, Nondistended; Bowel sounds present  EXTREMITIES:  trace LE edema  LABS:                        7.0    4.3   )-----------( 255      ( 2018 09:51 )             23.4     11-24    133<L>  |  90<L>  |  67.0<H>  ----------------------------<  145<H>  5.5<H>   |  27.0  |  5.77<H>    Ca    9.6      2018 09:51  Phos  3.8               Phosphorus Level, Serum: 3.8 mg/dL ( @ 06:19)          RADIOLOGY & ADDITIONAL TESTS:
NEPHROLOGY INTERVAL HPI/OVERNIGHT EVENTS:  pt clinically stable  no acute distress  tolerated HD yesterday    MEDICATIONS  (STANDING):  aspirin enteric coated 81 milliGRAM(s) Oral daily  calcitriol   Capsule 0.25 MICROGram(s) Oral daily  calcium carbonate   1250 mG (OsCal) 1 Tablet(s) Oral three times a day  carvedilol 12.5 milliGRAM(s) Oral every 12 hours  dextrose 5%. 1000 milliLiter(s) (50 mL/Hr) IV Continuous <Continuous>  dextrose 50% Injectable 12.5 Gram(s) IV Push once  dextrose 50% Injectable 25 Gram(s) IV Push once  dextrose 50% Injectable 25 Gram(s) IV Push once  ferrous    sulfate 325 milliGRAM(s) Oral daily  insulin glargine Injectable (LANTUS) 10 Unit(s) SubCutaneous at bedtime  insulin lispro (HumaLOG) corrective regimen sliding scale   SubCutaneous three times a day before meals  sacubitril 97 mG/valsartan 103 mG 1 Tablet(s) Oral two times a day    MEDICATIONS  (PRN):  ALBUTerol/ipratropium for Nebulization 3 milliLiter(s) Nebulizer every 6 hours PRN Shortness of Breath and/or Wheezing  dextrose 40% Gel 15 Gram(s) Oral once PRN Blood Glucose LESS THAN 70 milliGRAM(s)/deciliter  glucagon  Injectable 1 milliGRAM(s) IntraMuscular once PRN Glucose LESS THAN 70 milligrams/deciliter      Allergies    No Known Allergies          Vital Signs Last 24 Hrs  T(C): 36.6 (22 Nov 2018 10:16), Max: 36.7 (21 Nov 2018 14:18)  T(F): 97.8 (22 Nov 2018 10:16), Max: 98.1 (21 Nov 2018 14:18)  HR: 73 (22 Nov 2018 10:16) (68 - 76)  BP: 123/73 (22 Nov 2018 10:16) (123/73 - 155/75)  BP(mean): --  RR: 19 (22 Nov 2018 10:16) (16 - 20)  SpO2: 94% (22 Nov 2018 05:09) (84% - 95%)    PHYSICAL EXAM:    GENERAL: Thin, frail  HEAD:  NCAT  NECK: Supple, +JVD  NERVOUS SYSTEM:  Alert & Oriented X3  CHEST/LUNG: Clear with diminished BS (R > L)  HEART: Regular rate and rhythm; No rub  ABDOMEN: Soft, Nontender, Nondistended; Bowel sounds present  EXTREMITIES:  trace LE edema    LABS:                        8.1    4.2   )-----------( 250      ( 21 Nov 2018 10:29 )             27.7     11-21    138  |  94<L>  |  28.0<H>  ----------------------------<  268<H>  3.9   |  31.0<H>  |  2.74<H>    Ca    9.3      21 Nov 2018 10:29    TPro  7.8  /  Alb  3.7  /  TBili  1.1  /  DBili  x   /  AST  13  /  ALT  <5  /  AlkPhos  103  11-21    PT/INR - ( 21 Nov 2018 10:29 )   PT: 14.7 sec;   INR: 1.27 ratio         PTT - ( 21 Nov 2018 10:29 )  PTT:29.9 sec        RADIOLOGY & ADDITIONAL TESTS:
[facility]                                                        Office: [address] [place] [address]                                                       Telephone: [phone]. Fax:[phone]      CARDIOLOGY PROGRESS NOTE   ([facility])    Subjective: Patient seen and examined earlier this afternoon.  Feels well, breathing is better.  No pain. Echo performed showed recovery of LVEF. Status post right sided thoracentesis (1350 cc).     ROS: No headache, no chest pain, no SOB, no palpitations, no dizziness, no nausea, no bleeding    Chronic Conditions:     CURRENT MEDICATIONS  carvedilol 12.5 milliGRAM(s) Oral every 12 hours  sacubitril 97 mG/valsartan 103 mG 1 Tablet(s) Oral two times a day      ALBUTerol/ipratropium for Nebulization 3 milliLiter(s) Nebulizer every 6 hours PRN    acetaminophen   Tablet .. 650 milliGRAM(s) Oral every 6 hours PRN      dextrose 40% Gel 15 Gram(s) Oral once PRN  dextrose 50% Injectable 12.5 Gram(s) IV Push once  dextrose 50% Injectable 25 Gram(s) IV Push once  dextrose 50% Injectable 25 Gram(s) IV Push once  glucagon  Injectable 1 milliGRAM(s) IntraMuscular once PRN  insulin glargine Injectable (LANTUS) 10 Unit(s) SubCutaneous at bedtime  insulin lispro (HumaLOG) corrective regimen sliding scale   SubCutaneous three times a day before meals    aspirin enteric coated 81 milliGRAM(s) Oral daily  calcitriol   Capsule 0.25 MICROGram(s) Oral daily  calcium carbonate   1250 mG (OsCal) 1 Tablet(s) Oral three times a day  dextrose 5%. 1000 milliLiter(s) IV Continuous <Continuous>  ferrous    sulfate 325 milliGRAM(s) Oral daily    	  TELEMETRY: SR 60s, no events  Vitals:  T(C): 36.5 (11-23-18 @ 08:48), Max: 36.9 (11-22-18 @ 21:36)  HR: 70 (11-23-18 @ 12:28) (67 - 84)  BP: 146/70 (11-23-18 @ 12:28) (126/82 - 154/68)  RR: 16 (11-23-18 @ 12:28) (16 - 18)  SpO2: 93% (11-23-18 @ 12:28) (91% - 98%)  Wt(kg): --  I&O's Summary    22 Nov 2018 07:01  -  23 Nov 2018 07:00  --------------------------------------------------------  IN: 540 mL / OUT: 0 mL / NET: 540 mL    23 Nov 2018 07:01  -  23 Nov 2018 14:56  --------------------------------------------------------  IN: 120 mL / OUT: 0 mL / NET: 120 mL        Weight (kg): 62.7 (11-23 @ 05:26)  Daily T(C): 36.5 (11-23-18 @ 08:48), Max: 36.9 (11-22-18 @ 21:36)  HR: 70 (11-23-18 @ 12:28) (67 - 84)  BP: 146/70 (11-23-18 @ 12:28) (126/82 - 154/68)  RR: 16 (11-23-18 @ 12:28) (16 - 18)  SpO2: 93% (11-23-18 @ 12:28) (91% - 98%)  Wt(kg): --    Daily   Weight (kg): 62.7 (11-23 @ 05:26)    PHYSICAL EXAM:  Appearance: Normal, NAD	  HEENT:   Normal oral mucosa, PERRL, EOMI	  Lymphatic: No lymphadenopathy  Cardiovascular: Normal S1 S2, No JVD, No murmurs, No edema  Respiratory: right side decreased breath sounds  Psychiatry: A & O x 3, Mood & affect appropriate  Gastrointestinal:  Soft, Non-tender, + BS	  Skin: No rashes, No ecchymoses, No cyanosis  Neurologic: Non-focal  Extremities: Normal range of motion, No clubbing, cyanosis or edema  Vascular: Peripheral pulses palpable 2+ bilaterally, warm      ECG (tracing reviewed by me):  	    DIAGNOSTIC TESTING:  Echocardiogram (images reviewed by me): < from: TTE Echo Complete w/Doppler (11.22.18 @ 14:12) >  Summary:   1. Left ventricular ejection fraction, byvisual estimation, is 50 to   55%.   2. Technically good study.   3. Normal global left ventricular systolic function.   4. LV Ejection Fraction by Cabrera's Method with a biplane EF of 56 %.   5. Normal left ventricular internal cavity size.   6. Spectral Doppler shows impaired relaxation pattern of left   ventricular myocardial filling (Grade I diastolic dysfunction).   7. There is mild concentric left ventricular hypertrophy.   8. Normal left atrial size.   9. Thickening of the anterior mitralvalve leaflet.  10. Mild tricuspid regurgitation.  11. Sclerotic aortic valve with normal opening.  12. Lambl excrescence noted.  13. Pulmonic valve regurgitation.    LV Ejection Fraction by Cabrera's Method with a biplane EF of 56 %.     [id number] José Miguel Osuna MD, Electronically signed on 11/22/2018 at 3:02:07 PM    < end of copied text >    Catheterization:  Stress Test:    CXR (image reviewed by me):  OTHER: 	    LABS:	 	  CARDIAC MARKERS:                                  7.9    4.4   )-----------( 274      ( 23 Nov 2018 06:21 )             26.7     11-23    138  |  94<L>  |  41.0<H>  ----------------------------<  65<L>  4.6   |  29.0  |  3.85<H>    Ca    9.7      23 Nov 2018 06:21  Phos  3.6     11-23      BNP:   Lipid Profile:   HgA1c:   TSH:
seen for HF, fluid overload, HD    in chair, feels better. minimal cough.  ros otherwise negative       MEDICATIONS  (STANDING):  aspirin enteric coated 81 milliGRAM(s) Oral daily  calcitriol   Capsule 0.25 MICROGram(s) Oral daily  calcium carbonate   1250 mG (OsCal) 1 Tablet(s) Oral three times a day  carvedilol 12.5 milliGRAM(s) Oral every 12 hours  dextrose 5%. 1000 milliLiter(s) (50 mL/Hr) IV Continuous <Continuous>  dextrose 50% Injectable 12.5 Gram(s) IV Push once  dextrose 50% Injectable 25 Gram(s) IV Push once  dextrose 50% Injectable 25 Gram(s) IV Push once  ferrous    sulfate 325 milliGRAM(s) Oral daily  insulin glargine Injectable (LANTUS) 10 Unit(s) SubCutaneous at bedtime  insulin lispro (HumaLOG) corrective regimen sliding scale   SubCutaneous three times a day before meals  sacubitril 97 mG/valsartan 103 mG 1 Tablet(s) Oral two times a day    MEDICATIONS  (PRN):  ALBUTerol/ipratropium for Nebulization 3 milliLiter(s) Nebulizer every 6 hours PRN Shortness of Breath and/or Wheezing  dextrose 40% Gel 15 Gram(s) Oral once PRN Blood Glucose LESS THAN 70 milliGRAM(s)/deciliter  glucagon  Injectable 1 milliGRAM(s) IntraMuscular once PRN Glucose LESS THAN 70 milligrams/deciliter      Allergies    No Known Allergies    Vital Signs Last 24 Hrs  T(C): 36.6 (22 Nov 2018 05:09), Max: 36.7 (21 Nov 2018 14:18)  T(F): 97.8 (22 Nov 2018 05:09), Max: 98.1 (21 Nov 2018 14:18)  HR: 71 (22 Nov 2018 05:09) (68 - 76)  BP: 144/70 (22 Nov 2018 05:09) (130/70 - 155/75)  BP(mean): --  RR: 16 (22 Nov 2018 05:09) (16 - 20)  SpO2: 94% (22 Nov 2018 05:09) (84% - 95%)    PHYSICAL EXAM:    GENERAL: NAD  CHEST/LUNG: no bs right base 1/2 chest wall, clear left side   HEART: Regular rate and rhythm; S1 S2  ABDOMEN: Soft, Nontender, Nondistended; Bowel sounds present  EXTREMITIES:  no edema  NERVOUS SYSTEM:  Alert & Oriented X3, nonfocal    LABS:                        8.1    4.2   )-----------( 250      ( 21 Nov 2018 10:29 )             27.7     11-21    138  |  94<L>  |  28.0<H>  ----------------------------<  268<H>  3.9   |  31.0<H>  |  2.74<H>    Ca    9.3      21 Nov 2018 10:29    TPro  7.8  /  Alb  3.7  /  TBili  1.1  /  DBili  x   /  AST  13  /  ALT  <5  /  AlkPhos  103  11-21    PT/INR - ( 21 Nov 2018 10:29 )   PT: 14.7 sec;   INR: 1.27 ratio         PTT - ( 21 Nov 2018 10:29 )  PTT:29.9 sec      CAPILLARY BLOOD GLUCOSE      POCT Blood Glucose.: 126 mg/dL (22 Nov 2018 08:02)  POCT Blood Glucose.: 221 mg/dL (21 Nov 2018 22:57)  POCT Blood Glucose.: 268 mg/dL (21 Nov 2018 21:00)  POCT Blood Glucose.: 131 mg/dL (21 Nov 2018 17:27)        RADIOLOGY & ADDITIONAL TESTS:
CC: CHF    INTERVAL HPI/OVERNIGHT EVENTS: Patient seen and examined with [place]  service. Sitting up comfortably in a chair. denies sob, chest pain or palpitations. Denies orthopnea or pnd. Minimal urine output       Vital Signs Last 24 Hrs  T(C): 36.8 (26 Nov 2018 05:13), Max: 36.9 (25 Nov 2018 21:06)  T(F): 98.2 (26 Nov 2018 05:13), Max: 98.4 (25 Nov 2018 21:06)  HR: 85 (26 Nov 2018 05:13) (70 - 93)  BP: 146/72 (26 Nov 2018 05:13) (142/70 - 146/72)  BP(mean): --  RR: 16 (26 Nov 2018 05:13) (16 - 18)  SpO2: 95% (26 Nov 2018 05:13) (95% - 99%)    PHYSICAL EXAM:    GENERAL: NAD, AOX3  HEAD:  Atraumatic, Normocephalic  EYES: EOMI, PERRLA, conjunctiva and sclera clear  ENMT: Moist mucous membranes  CHEST/LUNG: bibasilar crackles   HEART: Regular rate and rhythm; No murmurs, rubs, or gallops  ABDOMEN: Soft, Nontender, Nondistended; Bowel sounds present  EXTREMITIES:  2+ Peripheral Pulses, No clubbing, cyanosis, or edema        MEDICATIONS  (STANDING):  aspirin enteric coated 81 milliGRAM(s) Oral daily  calcitriol   Capsule 0.25 MICROGram(s) Oral daily  calcium carbonate   1250 mG (OsCal) 1 Tablet(s) Oral three times a day  carvedilol 12.5 milliGRAM(s) Oral every 12 hours  dextrose 5%. 1000 milliLiter(s) (50 mL/Hr) IV Continuous <Continuous>  dextrose 50% Injectable 12.5 Gram(s) IV Push once  dextrose 50% Injectable 25 Gram(s) IV Push once  dextrose 50% Injectable 25 Gram(s) IV Push once  epoetin gian Injectable 18306 Unit(s) IV Push <User Schedule>  ferrous    sulfate 325 milliGRAM(s) Oral daily  insulin glargine Injectable (LANTUS) 10 Unit(s) SubCutaneous at bedtime  insulin lispro (HumaLOG) corrective regimen sliding scale   SubCutaneous three times a day before meals  sacubitril 97 mG/valsartan 103 mG 1 Tablet(s) Oral two times a day    MEDICATIONS  (PRN):  acetaminophen   Tablet .. 650 milliGRAM(s) Oral every 6 hours PRN Moderate Pain (4 - 6)  ALBUTerol/ipratropium for Nebulization 3 milliLiter(s) Nebulizer every 6 hours PRN Shortness of Breath and/or Wheezing  dextrose 40% Gel 15 Gram(s) Oral once PRN Blood Glucose LESS THAN 70 milliGRAM(s)/deciliter  glucagon  Injectable 1 milliGRAM(s) IntraMuscular once PRN Glucose LESS THAN 70 milligrams/deciliter      Allergies    No Known Allergies    Intolerances          LABS:                          7.4    3.6   )-----------( 266      ( 26 Nov 2018 06:00 )             24.6     11-25    134<L>  |  97<L>  |  53.0<H>  ----------------------------<  87  5.1   |  27.0  |  4.85<H>    Ca    9.0      25 Nov 2018 06:02            RADIOLOGY & ADDITIONAL TESTS:

## 2018-11-29 LAB
HCV RNA FLD QL NAA+PROBE: SIGNIFICANT CHANGE UP
HCV RNA SPEC QL PROBE+SIG AMP: SIGNIFICANT CHANGE UP

## 2018-12-31 ENCOUNTER — INPATIENT (INPATIENT)
Facility: HOSPITAL | Age: 58
LOS: 6 days | Discharge: HOME | DRG: 291 | End: 2019-01-07
Attending: INTERNAL MEDICINE | Admitting: INTERNAL MEDICINE
Payer: MEDICARE

## 2018-12-31 ENCOUNTER — RESULT REVIEW (OUTPATIENT)
Age: 58
End: 2018-12-31

## 2018-12-31 VITALS
DIASTOLIC BLOOD PRESSURE: 80 MMHG | SYSTOLIC BLOOD PRESSURE: 190 MMHG | TEMPERATURE: 100 F | HEART RATE: 91 BPM | OXYGEN SATURATION: 88 % | RESPIRATION RATE: 22 BRPM | WEIGHT: 149.91 LBS

## 2018-12-31 DIAGNOSIS — I77.0 ARTERIOVENOUS FISTULA, ACQUIRED: Chronic | ICD-10-CM

## 2018-12-31 DIAGNOSIS — Z49.01 ENCOUNTER FOR FITTING AND ADJUSTMENT OF EXTRACORPOREAL DIALYSIS CATHETER: Chronic | ICD-10-CM

## 2018-12-31 DIAGNOSIS — J90 PLEURAL EFFUSION, NOT ELSEWHERE CLASSIFIED: ICD-10-CM

## 2018-12-31 DIAGNOSIS — J10.1 INFLUENZA DUE TO OTHER IDENTIFIED INFLUENZA VIRUS WITH OTHER RESPIRATORY MANIFESTATIONS: ICD-10-CM

## 2018-12-31 LAB
ALBUMIN SERPL ELPH-MCNC: 4.4 G/DL — SIGNIFICANT CHANGE UP (ref 3.3–5.2)
ALP SERPL-CCNC: 161 U/L — HIGH (ref 40–120)
ALT FLD-CCNC: 19 U/L — SIGNIFICANT CHANGE UP
ANION GAP SERPL CALC-SCNC: 15 MMOL/L — SIGNIFICANT CHANGE UP (ref 5–17)
APTT BLD: 30 SEC — SIGNIFICANT CHANGE UP (ref 27.5–36.3)
AST SERPL-CCNC: 43 U/L — HIGH
B PERT IGG+IGM PNL SER: CLEAR — SIGNIFICANT CHANGE UP
BILIRUB SERPL-MCNC: 0.9 MG/DL — SIGNIFICANT CHANGE UP (ref 0.4–2)
BLD GP AB SCN SERPL QL: SIGNIFICANT CHANGE UP
BUN SERPL-MCNC: 26 MG/DL — HIGH (ref 8–20)
CALCIUM SERPL-MCNC: 9.6 MG/DL — SIGNIFICANT CHANGE UP (ref 8.6–10.2)
CHLORIDE SERPL-SCNC: 92 MMOL/L — LOW (ref 98–107)
CO2 SERPL-SCNC: 30 MMOL/L — HIGH (ref 22–29)
COLOR FLD: YELLOW
CREAT SERPL-MCNC: 3.62 MG/DL — HIGH (ref 0.5–1.3)
EOSINOPHIL # BLD AUTO: 0 K/UL — SIGNIFICANT CHANGE UP (ref 0–0.5)
EOSINOPHIL NFR BLD AUTO: 0.2 % — SIGNIFICANT CHANGE UP (ref 0–6)
FLUAV H3 RNA SPEC QL NAA+PROBE: DETECTED
FLUAV SUBTYP SPEC NAA+PROBE: DETECTED
FLUID INTAKE SUBSTANCE CLASS: SIGNIFICANT CHANGE UP
FLUID SEGMENTED GRANULOCYTES: 2 % — SIGNIFICANT CHANGE UP
GAS PNL BLDA: SIGNIFICANT CHANGE UP
GLUCOSE SERPL-MCNC: 254 MG/DL — HIGH (ref 70–115)
GRAM STN FLD: SIGNIFICANT CHANGE UP
HCT VFR BLD CALC: 30.1 % — LOW (ref 37–47)
HGB BLD-MCNC: 9.1 G/DL — LOW (ref 12–16)
INR BLD: 1.36 RATIO — HIGH (ref 0.88–1.16)
LACTATE BLDV-MCNC: 1.7 MMOL/L — SIGNIFICANT CHANGE UP (ref 0.5–2)
LYMPHOCYTES # BLD AUTO: 0.4 K/UL — LOW (ref 1–4.8)
LYMPHOCYTES # BLD AUTO: 10.9 % — LOW (ref 20–55)
LYMPHOCYTES # FLD: 28 % — SIGNIFICANT CHANGE UP
MCHC RBC-ENTMCNC: 28.9 PG — SIGNIFICANT CHANGE UP (ref 27–31)
MCHC RBC-ENTMCNC: 30.2 G/DL — LOW (ref 32–36)
MCV RBC AUTO: 95.6 FL — SIGNIFICANT CHANGE UP (ref 81–99)
MESOTHL CELL # FLD: 9 % — SIGNIFICANT CHANGE UP
MONOCYTES # BLD AUTO: 0.4 K/UL — SIGNIFICANT CHANGE UP (ref 0–0.8)
MONOCYTES NFR BLD AUTO: 9 % — SIGNIFICANT CHANGE UP (ref 3–10)
MONOS+MACROS # FLD: 61 % — SIGNIFICANT CHANGE UP
NEUTROPHILS # BLD AUTO: 3.2 K/UL — SIGNIFICANT CHANGE UP (ref 1.8–8)
NEUTROPHILS NFR BLD AUTO: 79.7 % — HIGH (ref 37–73)
NT-PROBNP SERPL-SCNC: HIGH PG/ML (ref 0–300)
PH FLD: 8.5 — SIGNIFICANT CHANGE UP
PLATELET # BLD AUTO: 216 K/UL — SIGNIFICANT CHANGE UP (ref 150–400)
POTASSIUM SERPL-MCNC: 4.9 MMOL/L — SIGNIFICANT CHANGE UP (ref 3.5–5.3)
POTASSIUM SERPL-SCNC: 4.9 MMOL/L — SIGNIFICANT CHANGE UP (ref 3.5–5.3)
PROT SERPL-MCNC: 8.5 G/DL — SIGNIFICANT CHANGE UP (ref 6.6–8.7)
PROTHROM AB SERPL-ACNC: 15.8 SEC — HIGH (ref 10–12.9)
RAPID RVP RESULT: DETECTED
RBC # BLD: 3.15 M/UL — LOW (ref 4.4–5.2)
RBC # FLD: 16.3 % — HIGH (ref 11–15.6)
RCV VOL RI: 90 /UL — HIGH (ref 0–5)
SODIUM SERPL-SCNC: 137 MMOL/L — SIGNIFICANT CHANGE UP (ref 135–145)
SPECIMEN SOURCE: SIGNIFICANT CHANGE UP
T4 AB SER-ACNC: 8.1 UG/DL — SIGNIFICANT CHANGE UP (ref 4.5–12)
TOTAL NUCLEATED CELL COUNT, BODY FLUID: 65 /UL — HIGH (ref 0–5)
TROPONIN T SERPL-MCNC: 0.55 NG/ML — HIGH (ref 0–0.06)
TSH SERPL-MCNC: 7.73 UIU/ML — HIGH (ref 0.27–4.2)
TUBE TYPE: SIGNIFICANT CHANGE UP
TYPE + AB SCN PNL BLD: SIGNIFICANT CHANGE UP
WBC # BLD: 4 K/UL — LOW (ref 4.8–10.8)
WBC # FLD AUTO: 4 K/UL — LOW (ref 4.8–10.8)

## 2018-12-31 PROCEDURE — 71045 X-RAY EXAM CHEST 1 VIEW: CPT | Mod: 26,77

## 2018-12-31 PROCEDURE — 74176 CT ABD & PELVIS W/O CONTRAST: CPT | Mod: 26

## 2018-12-31 PROCEDURE — 71045 X-RAY EXAM CHEST 1 VIEW: CPT | Mod: 26

## 2018-12-31 PROCEDURE — 99223 1ST HOSP IP/OBS HIGH 75: CPT

## 2018-12-31 PROCEDURE — 71250 CT THORAX DX C-: CPT | Mod: 26

## 2018-12-31 PROCEDURE — 99285 EMERGENCY DEPT VISIT HI MDM: CPT

## 2018-12-31 PROCEDURE — 88305 TISSUE EXAM BY PATHOLOGIST: CPT | Mod: 26

## 2018-12-31 PROCEDURE — 88112 CYTOPATH CELL ENHANCE TECH: CPT | Mod: 26

## 2018-12-31 PROCEDURE — 32557 INSERT CATH PLEURA W/ IMAGE: CPT

## 2018-12-31 RX ORDER — DEXTROSE 50 % IN WATER 50 %
25 SYRINGE (ML) INTRAVENOUS ONCE
Qty: 0 | Refills: 0 | Status: DISCONTINUED | OUTPATIENT
Start: 2018-12-31 | End: 2019-01-07

## 2018-12-31 RX ORDER — ACETAMINOPHEN 500 MG
650 TABLET ORAL ONCE
Qty: 0 | Refills: 0 | Status: COMPLETED | OUTPATIENT
Start: 2018-12-31 | End: 2018-12-31

## 2018-12-31 RX ORDER — DOCUSATE SODIUM 100 MG
100 CAPSULE ORAL
Qty: 0 | Refills: 0 | Status: DISCONTINUED | OUTPATIENT
Start: 2018-12-31 | End: 2019-01-07

## 2018-12-31 RX ORDER — PIPERACILLIN AND TAZOBACTAM 4; .5 G/20ML; G/20ML
2.25 INJECTION, POWDER, LYOPHILIZED, FOR SOLUTION INTRAVENOUS ONCE
Qty: 0 | Refills: 0 | Status: DISCONTINUED | OUTPATIENT
Start: 2018-12-31 | End: 2018-12-31

## 2018-12-31 RX ORDER — CARVEDILOL PHOSPHATE 80 MG/1
12.5 CAPSULE, EXTENDED RELEASE ORAL EVERY 12 HOURS
Qty: 0 | Refills: 0 | Status: DISCONTINUED | OUTPATIENT
Start: 2018-12-31 | End: 2019-01-07

## 2018-12-31 RX ORDER — PANTOPRAZOLE SODIUM 20 MG/1
40 TABLET, DELAYED RELEASE ORAL
Qty: 0 | Refills: 0 | Status: DISCONTINUED | OUTPATIENT
Start: 2018-12-31 | End: 2019-01-07

## 2018-12-31 RX ORDER — SACUBITRIL AND VALSARTAN 24; 26 MG/1; MG/1
1 TABLET, FILM COATED ORAL
Qty: 0 | Refills: 0 | Status: DISCONTINUED | OUTPATIENT
Start: 2018-12-31 | End: 2019-01-07

## 2018-12-31 RX ORDER — GLUCAGON INJECTION, SOLUTION 0.5 MG/.1ML
1 INJECTION, SOLUTION SUBCUTANEOUS ONCE
Qty: 0 | Refills: 0 | Status: DISCONTINUED | OUTPATIENT
Start: 2018-12-31 | End: 2019-01-07

## 2018-12-31 RX ORDER — DEXTROSE 50 % IN WATER 50 %
12.5 SYRINGE (ML) INTRAVENOUS ONCE
Qty: 0 | Refills: 0 | Status: DISCONTINUED | OUTPATIENT
Start: 2018-12-31 | End: 2019-01-07

## 2018-12-31 RX ORDER — INSULIN LISPRO 100/ML
VIAL (ML) SUBCUTANEOUS
Qty: 0 | Refills: 0 | Status: DISCONTINUED | OUTPATIENT
Start: 2018-12-31 | End: 2019-01-07

## 2018-12-31 RX ORDER — SENNA PLUS 8.6 MG/1
2 TABLET ORAL AT BEDTIME
Qty: 0 | Refills: 0 | Status: DISCONTINUED | OUTPATIENT
Start: 2018-12-31 | End: 2019-01-07

## 2018-12-31 RX ORDER — CEFTRIAXONE 500 MG/1
1 INJECTION, POWDER, FOR SOLUTION INTRAMUSCULAR; INTRAVENOUS ONCE
Qty: 0 | Refills: 0 | Status: COMPLETED | OUTPATIENT
Start: 2018-12-31 | End: 2018-12-31

## 2018-12-31 RX ORDER — AZITHROMYCIN 500 MG/1
500 TABLET, FILM COATED ORAL ONCE
Qty: 0 | Refills: 0 | Status: COMPLETED | OUTPATIENT
Start: 2018-12-31 | End: 2018-12-31

## 2018-12-31 RX ORDER — SODIUM CHLORIDE 9 MG/ML
1000 INJECTION, SOLUTION INTRAVENOUS
Qty: 0 | Refills: 0 | Status: DISCONTINUED | OUTPATIENT
Start: 2018-12-31 | End: 2019-01-07

## 2018-12-31 RX ORDER — DEXTROSE 50 % IN WATER 50 %
15 SYRINGE (ML) INTRAVENOUS ONCE
Qty: 0 | Refills: 0 | Status: DISCONTINUED | OUTPATIENT
Start: 2018-12-31 | End: 2019-01-07

## 2018-12-31 RX ORDER — INSULIN GLARGINE 100 [IU]/ML
10 INJECTION, SOLUTION SUBCUTANEOUS AT BEDTIME
Qty: 0 | Refills: 0 | Status: DISCONTINUED | OUTPATIENT
Start: 2018-12-31 | End: 2019-01-05

## 2018-12-31 RX ORDER — INSULIN LISPRO 100/ML
VIAL (ML) SUBCUTANEOUS AT BEDTIME
Qty: 0 | Refills: 0 | Status: DISCONTINUED | OUTPATIENT
Start: 2018-12-31 | End: 2019-01-07

## 2018-12-31 RX ORDER — ASPIRIN/CALCIUM CARB/MAGNESIUM 324 MG
81 TABLET ORAL DAILY
Qty: 0 | Refills: 0 | Status: DISCONTINUED | OUTPATIENT
Start: 2018-12-31 | End: 2019-01-07

## 2018-12-31 RX ORDER — ONDANSETRON 8 MG/1
8 TABLET, FILM COATED ORAL ONCE
Qty: 0 | Refills: 0 | Status: COMPLETED | OUTPATIENT
Start: 2018-12-31 | End: 2018-12-31

## 2018-12-31 RX ADMIN — Medication 650 MILLIGRAM(S): at 10:37

## 2018-12-31 RX ADMIN — Medication 650 MILLIGRAM(S): at 11:27

## 2018-12-31 RX ADMIN — Medication 30 MILLIGRAM(S): at 15:26

## 2018-12-31 RX ADMIN — AZITHROMYCIN 500 MILLIGRAM(S): 500 TABLET, FILM COATED ORAL at 11:26

## 2018-12-31 RX ADMIN — SACUBITRIL AND VALSARTAN 1 TABLET(S): 24; 26 TABLET, FILM COATED ORAL at 19:08

## 2018-12-31 RX ADMIN — CEFTRIAXONE 100 GRAM(S): 500 INJECTION, POWDER, FOR SOLUTION INTRAMUSCULAR; INTRAVENOUS at 10:37

## 2018-12-31 RX ADMIN — ONDANSETRON 8 MILLIGRAM(S): 8 TABLET, FILM COATED ORAL at 11:26

## 2018-12-31 RX ADMIN — AZITHROMYCIN 255 MILLIGRAM(S): 500 TABLET, FILM COATED ORAL at 10:45

## 2018-12-31 RX ADMIN — CEFTRIAXONE 1 GRAM(S): 500 INJECTION, POWDER, FOR SOLUTION INTRAMUSCULAR; INTRAVENOUS at 10:46

## 2018-12-31 NOTE — ED CLERICAL - NS ED CLERK UNITS
"Time of notification: 8:59 PM  Provider notified: Surg CC *[id number]    Patient status: Sepsis protocol triggered; lactic resulted 1.4. VSS - /49 (BP Location: Left arm)  Pulse 90  Temp 98.3  F (36.8  C) (Oral)  Resp 18  Ht 1.626 m (5' 4\")  Wt 92.3 kg (203 lb 6.4 oz)  SpO2 96%  BMI 34.91 kg/m2    Orders received: No interventions ordered at this time. Continue to monitor closely & recheck lactic acid with AM labs.     " ISO/4TWR MON/ ISO/2GUL 5TWR/MON/ ISO 6TWR//ISO 6TWR/6202-01 /ISO

## 2018-12-31 NOTE — ED ADULT TRIAGE NOTE - CHIEF COMPLAINT QUOTE
pt c/o vomiting blood since last night.  also reports back pain.  pt is O2 dependent.  brought to ED by family off of oxygen.  room air sat 88%.

## 2018-12-31 NOTE — CONSULT NOTE ADULT - ASSESSMENT
59 y/o female pmhx ESRD ( HD MWF), DM2,  systolic heart failure, CAD pafib no AC due hx GI bleed, pericardial effusion, chronic right pleural effusion presented to ED w/ SOB x 3days.  Admitted w/ acute respiratory failure related to right sided pleural effusion, found to be influenza A positive.   At this time patient appears comfortable on NC w/out any significant respiratory distress and HD stable, she would not benefit from an ICU admission. Please re consult if her condition deteriorates.    Recommend  - BiPAP PRN for work of breathing if indicated. Initial settings of 12/5 50%, titrate Fio2 to keep O2 sat >92%.   - R pig tail as per CT surgery   - chronically elevated trops in setting of ESRD, continue trending enzymes   - ESRD , c/w HD schedule M/W/F.   - Influenza A, tamiflu x 5days. tylenol PRN for fever

## 2018-12-31 NOTE — CONSULT NOTE ADULT - SUBJECTIVE AND OBJECTIVE BOX
HPI:  58 y.o female with  PMHx of ESRD (on HD MWF), NICM w HFrEF, Dm2 on insulin, pafib no a/c due to GI bleed, pericardial effusion, chronic right pleural effusion presents for shortness of breath, worsening for 3 days. associated with cough productive of clear sputum. On arrival she was noted to have fever 102, RVP positive for flu, chest xray showed increased right pleural effusion and airspace disease in  right mid and lower lung field. Pt reports feeling warm at home, no documented fever associated with chills. Pt reports chest discomfort associated with cough. Denies palpitation, abd. pain, nausea, vomiting, headache, dizziness, numbness, tingling, urinary and bowel complaints.      PAST MEDICAL & SURGICAL HISTORY:  Coronary artery disease, angina presence unspecified, unspecified vessel or lesion type, unspecified whether native or transplanted heart  Paroxysmal atrial fibrillation  Anemia due to chronic kidney disease, unspecified CKD stage  Chronic systolic congestive heart failure  Pleural effusion  Pericardial effusion  End stage renal disease on dialysis  HLD (hyperlipidemia)  HTN (hypertension)  DM (diabetes mellitus)  A-V fistula  Encounter for dialysis catheter care      REVIEW OF SYSTEMS    General:No Weight change/ Fatigue/ HA/Dizzy	  Skin/Breast: No Rashes/ Lesions/ Masses	  Ophthalmologic: No Blurry vision/ Glaucoma/ Blindness  ENMT: No Hearing loss/ Drainage/ Lesions	  Respiratory and Thorax: No Cough/ Wheezing/ SOB/ Hemoptysis/ Sputum production  Cardiovascular: No Chest pain/ Palpitations/ Diaphoresis	  Gastrointestinal: No Nausea/ Vomiting/ Constipation/ Appetite Change	  Genitourinary: No Heamturia/ Dysuria/ Frequency change/ Impotence	  Musculoskeletal: No Pain/ Weakness/ Claudication	  Neurological: No Seizures/ TIA/CVA/ Parastesias	  Psychiatric: No Dementia/ Depression/ SI/HI	  Hematology/Lymphatics: No hx of bleeding/ Edema	  Endocrine: No Hyperglycemia/ Hypoglycemia  Allergic/Immunologic: No Anaphylaxis/ Intolerance/ Recent illnesses    MEDICATIONS  (STANDING):  aspirin enteric coated 81 milliGRAM(s) Oral daily  carvedilol 12.5 milliGRAM(s) Oral every 12 hours  dextrose 5%. 1000 milliLiter(s) (50 mL/Hr) IV Continuous <Continuous>  dextrose 50% Injectable 12.5 Gram(s) IV Push once  dextrose 50% Injectable 25 Gram(s) IV Push once  dextrose 50% Injectable 25 Gram(s) IV Push once  docusate sodium 100 milliGRAM(s) Oral two times a day  insulin glargine Injectable (LANTUS) 10 Unit(s) SubCutaneous at bedtime  insulin lispro (HumaLOG) corrective regimen sliding scale   SubCutaneous three times a day before meals  insulin lispro (HumaLOG) corrective regimen sliding scale   SubCutaneous at bedtime  oseltamivir 30 milliGRAM(s) Oral <User Schedule>  pantoprazole    Tablet 40 milliGRAM(s) Oral before breakfast  sacubitril 97 mG/valsartan 103 mG 1 Tablet(s) Oral two times a day    MEDICATIONS  (PRN):  dextrose 40% Gel 15 Gram(s) Oral once PRN Blood Glucose LESS THAN 70 milliGRAM(s)/deciliter  glucagon  Injectable 1 milliGRAM(s) IntraMuscular once PRN Glucose LESS THAN 70 milligrams/deciliter  senna 2 Tablet(s) Oral at bedtime PRN Constipation    No Known Allergies    FAMILY HISTORY:  Family history of kidney disease in brother (Sibling)      Vital Signs Last 24 Hrs  T(C): 37.6 (31 Dec 2018 13:16), Max: 39 (31 Dec 2018 10:18)  T(F): 99.6 (31 Dec 2018 13:16), Max: 102.2 (31 Dec 2018 10:18)  HR: 88 (31 Dec 2018 13:16) (86 - 91)  BP: 160/70 (31 Dec 2018 13:16) (160/70 - 190/80)  RR: 20 (31 Dec 2018 13:16) (18 - 22)  SpO2: 98% (31 Dec 2018 13:16) (88% - 98%)    General: Well-developed female lying on stretcher in NAD.  Neurology: Awake, nonfocal, KWONG x 4  EENT: PERRL. Conjunctiva pink, sclerae white. Gross hearing intact, grossly patent pharynx.  Neck: Trachea midline, No JVD,   Respiratory: CTA B/L, No wheezing, rales, rhonchi  CV: RRR, S1S2, no murmurs, rubs or gallops  Abdominal: Soft, NT, ND +BS,   Psych: A&Ox3.    LABS:                  9.1    4.0   )-----------( 216      ( 31 Dec 2018 10:31 )             30.1     12-31    137  |  92<L>  |  26.0<H>  ----------------------------<  254<H>  4.9   |  30.0<H>  |  3.62<H>    Ca    9.6      31 Dec 2018 10:31    TPro  8.5  /  Alb  4.4  /  TBili  0.9  /  DBili  x   /  AST  43<H>  /  ALT  19  /  AlkPhos  161<H>  12-31    PT/INR - ( 31 Dec 2018 10:31 )   PT: 15.8 sec;   INR: 1.36 ratio      PTT - ( 31 Dec 2018 10:31 )  PTT:30.0 sec      RADIOLOGY & ADDITIONAL STUDIES:   < from: Xray Chest 1 View-PORTABLE IMMEDIATE (12.31.18 @ 11:55) >  Findings:     Improved aeration at the left lung base. Increased density   in the right lower lung field compatible with increasing right pleural   effusion and airspace disease.  .    The pulmonary vasculature and aorta   are normal for age. Heart size is unremarkable.   The thorax is normal for age.    Impression: Increased right pleural effusion and airspace disease in   right mid and lower lung field.  < end of copied text >      < from: CT Abdomen and Pelvis No Cont (12.31.18 @ 11:39) >  Impression: Ascites and anasarca.  Large right pleural effusion. Atelectasis right middle lobe and right   lower lobe.  Small left pleural effusion. Cardiomegaly.  Bilateral cortical renal cysts.  < end of copied text >

## 2018-12-31 NOTE — PHARMACOTHERAPY INTERVENTION NOTE - COMMENTS
Patient on ESRD MWF, positive for FLU A. Recommended switching oseltamavir dose to 30 mG STAT, followed by 30 mG after every HD for 5 days.

## 2018-12-31 NOTE — CONSULT NOTE ADULT - ASSESSMENT
58 y.o female with  PMHx of ESRD (on HD MWF), NICM w HFrEF, Dm2 on insulin, pafib no a/c due to GI bleed, pericardial effusion, and chronic right pleural effusion presents with worsening shortness of breath with associated cough found to have a large pleural effusion on CXR and CT scan.

## 2018-12-31 NOTE — ED ADULT NURSE REASSESSMENT NOTE - NS ED NURSE REASSESS COMMENT FT1
Ronny Garcia from CTICU placed lt chest tube on atrium device, followed by CT scan. Pt tolerated well. Resting in bed. tolerates all medications

## 2018-12-31 NOTE — ED PROVIDER NOTE - CARE PLAN
Principal Discharge DX:	Influenza A  Secondary Diagnosis:	HCAP (healthcare-associated pneumonia)  Secondary Diagnosis:	Pleural effusion  Secondary Diagnosis:	Elevated troponin

## 2018-12-31 NOTE — CONSULT NOTE ADULT - PROBLEM SELECTOR RECOMMENDATION 9
Right pigtail catheter placed.  Drained 1700cc of serous fluid and clamped; To be unclamped 1 hour after (1830) and kept on continuous wall suction.  Placement confirmed on CXR.  Pleural fluid specimen sent.  Admit to medicine, will continue to follow.  Care per primary team.  D/w Dr. Null.

## 2018-12-31 NOTE — CONSULT NOTE ADULT - ATTENDING COMMENTS
57 y/o female with ESRD, prior cardiomyopathy with improved EF now ( 55%) , DM, hyperlipidemia, non obstructive CAD , prior history of pleural effusion s/p thoracocentesis presenting with SOB,  N/V , , productive cough x 1 week   elevated TNI 0.55 , but has been always at this level in setting of ESRD   Non obstructive CAD by cath in 3/2018   continue current medical therapy with carvedilol and entresto if not hypotensive   + flu positive , treat as per hospitalist team   Right sided pleural effusion may need throacocentesis

## 2018-12-31 NOTE — CONSULT NOTE ADULT - ASSESSMENT
59 y/o  female with ESRD on HD M/W/F, HFrEF LVEF 20-25% from echo in 10/2018, NICM (non-obstructive CAD on cath 3/2018), insulin dependent diabetes mellitus, hypertension, hyperlipidemia, paroxysmal atrial fibrillation (not on AC due to hx of GI bleed, has not followed up in the office regarding watchman referral), uremic pericarditis s/p pericardiocentesis, right sided pleural effusion s/p thoracentesis 9/2018, hx of dietary indiscretions, presents with worsening N/V x 1 day, shortness of breath and productive cough x 1 week.     Shortness of Breath  - Pleural effusion with thoracentesis 11/23/18; (1350ml- removed)   - Worsening pleural effusion compared to 11/27  - CT-abd/pelvis - large R pleural effusion; small L pleural effusion, cardiomegaly.   - ? infectious source- blood cultures sent.   - NICM- ECHO 11/23 EF 50-55%, normal LV systolic function, mild TR  - continue entresto, carvedilol     ESRD  - HD yesterday instead of normal MWF due to holiday.  - consult nephrology regarding dialysis schedule    Paroxysmal Afib-  - SR at this time  - Hx recurrent GI bleed in past, high risk for bleeding  - previously referred for watchman, can be reevaluated out pt. 59 y/o  female with ESRD on HD M/W/F, HFrEF LVEF 20-25% from echo in 10/2018, NICM (non-obstructive CAD on cath 3/2018), insulin dependent diabetes mellitus, hypertension, hyperlipidemia, paroxysmal atrial fibrillation (not on AC due to hx of GI bleed, has not followed up in the office regarding watchman referral), uremic pericarditis s/p pericardiocentesis, right sided pleural effusion s/p thoracentesis 9/2018, hx of dietary indiscretions, presents with worsening N/V x 1 day, shortness of breath and productive cough x 1 week.     Shortness of Breath  - Pleural effusion with thoracentesis 11/23/18; (1350ml- removed)   - Worsening pleural effusion compared to 11/27  - CT-abd/pelvis - large R pleural effusion; small L pleural effusion, cardiomegaly.   - + flu positive  - NICM- last ECHO 11/23 EF 50-55%, normal LV systolic function, mild TR  - continue entresto, carvedilol     ESRD  - HD yesterday instead of normal MWF due to holiday.  - consult nephrology regarding dialysis schedule    Paroxysmal Afib-  - SR at this time  - Hx recurrent GI bleed in past, high risk for bleeding  - previously referred for watchman, can be reevaluated out pt.

## 2018-12-31 NOTE — CONSULT NOTE ADULT - SUBJECTIVE AND OBJECTIVE BOX
Patient is a 58y old  Female who presents with a chief complaint of SOB (31 Dec 2018 19:12)      BRIEF HOSPITAL COURSE:  59 y/o female pmhx ESRD ( HD MWF), DM2,  systolic heart failure, CAD pafib no AC due hx GI bleed, pericardial effusion, chronic right pleural effusion presented to ED w/ SOB x 3days.     Events last 24 hours: ***    PAST MEDICAL & SURGICAL HISTORY:  Coronary artery disease, angina presence unspecified, unspecified vessel or lesion type, unspecified whether native or transplanted heart  Paroxysmal atrial fibrillation  Anemia due to chronic kidney disease, unspecified CKD stage  Chronic systolic congestive heart failure  Pleural effusion  Pericardial effusion  End stage renal disease on dialysis  HLD (hyperlipidemia)  HTN (hypertension)  DM (diabetes mellitus)  A-V fistula  Encounter for dialysis catheter care    Allergies    No Known Allergies    Intolerances      FAMILY HISTORY:  Family history of kidney disease in brother (Sibling)      Review of Systems:  CONSTITUTIONAL: No fever, chills, or fatigue  EYES: No eye pain, visual disturbances, or discharge  ENMT:  No difficulty hearing, tinnitus, vertigo; No sinus or throat pain  NECK: No pain or stiffness  RESPIRATORY: No cough, wheezing, chills or hemoptysis; No shortness of breath  CARDIOVASCULAR: No chest pain, palpitations, dizziness, or leg swelling  GASTROINTESTINAL: No abdominal or epigastric pain. No nausea, vomiting, or hematemesis; No diarrhea or constipation. No melena or hematochezia.  GENITOURINARY: No dysuria, frequency, hematuria, or incontinence  NEUROLOGICAL: No headaches, memory loss, loss of strength, numbness, or tremors  SKIN: No itching, burning, rashes, or lesions   MUSCULOSKELETAL: No joint pain or swelling; No muscle, back, or extremity pain  PSYCHIATRIC: No depression, anxiety, mood swings, or difficulty sleeping      Medications:  oseltamivir 30 milliGRAM(s) Oral <User Schedule>    carvedilol 12.5 milliGRAM(s) Oral every 12 hours  sacubitril 97 mG/valsartan 103 mG 1 Tablet(s) Oral two times a day          aspirin enteric coated 81 milliGRAM(s) Oral daily    docusate sodium 100 milliGRAM(s) Oral two times a day  pantoprazole    Tablet 40 milliGRAM(s) Oral before breakfast  senna 2 Tablet(s) Oral at bedtime PRN      dextrose 40% Gel 15 Gram(s) Oral once PRN  dextrose 50% Injectable 12.5 Gram(s) IV Push once  dextrose 50% Injectable 25 Gram(s) IV Push once  dextrose 50% Injectable 25 Gram(s) IV Push once  glucagon  Injectable 1 milliGRAM(s) IntraMuscular once PRN  insulin glargine Injectable (LANTUS) 10 Unit(s) SubCutaneous at bedtime  insulin lispro (HumaLOG) corrective regimen sliding scale   SubCutaneous three times a day before meals  insulin lispro (HumaLOG) corrective regimen sliding scale   SubCutaneous at bedtime    dextrose 5%. 1000 milliLiter(s) IV Continuous <Continuous>                ICU Vital Signs Last 24 Hrs  T(C): 38.4 (31 Dec 2018 21:51), Max: 39 (31 Dec 2018 10:18)  T(F): 101.2 (31 Dec 2018 21:51), Max: 102.2 (31 Dec 2018 10:18)  HR: 83 (31 Dec 2018 21:51) (80 - 91)  BP: 146/72 (31 Dec 2018 21:51) (123/71 - 190/80)  BP(mean): --  ABP: --  ABP(mean): --  RR: 32 (31 Dec 2018 21:51) (18 - 34)  SpO2: 96% (31 Dec 2018 21:51) (88% - 100%)    Vital Signs Last 24 Hrs  T(C): 38.4 (31 Dec 2018 21:51), Max: 39 (31 Dec 2018 10:18)  T(F): 101.2 (31 Dec 2018 21:51), Max: 102.2 (31 Dec 2018 10:18)  HR: 83 (31 Dec 2018 21:51) (80 - 91)  BP: 146/72 (31 Dec 2018 21:51) (123/71 - 190/80)  BP(mean): --  RR: 32 (31 Dec 2018 21:51) (18 - 34)  SpO2: 96% (31 Dec 2018 21:51) (88% - 100%)    ABG - ( 31 Dec 2018 22:08 )  pH, Arterial: 7.45  pH, Blood: x     /  pCO2: 47    /  pO2: 63    / HCO3: 32    / Base Excess: 7.8   /  SaO2: 94                  I&O's Detail        LABS:                        9.1    4.0   )-----------( 216      ( 31 Dec 2018 10:31 )             30.1     12-31    137  |  92<L>  |  26.0<H>  ----------------------------<  254<H>  4.9   |  30.0<H>  |  3.62<H>    Ca    9.6      31 Dec 2018 10:31    TPro  8.5  /  Alb  4.4  /  TBili  0.9  /  DBili  x   /  AST  43<H>  /  ALT  19  /  AlkPhos  161<H>  12-31      CARDIAC MARKERS ( 31 Dec 2018 10:31 )  x     / 0.55 ng/mL / x     / x     / x          CAPILLARY BLOOD GLUCOSE        PT/INR - ( 31 Dec 2018 10:31 )   PT: 15.8 sec;   INR: 1.36 ratio         PTT - ( 31 Dec 2018 10:31 )  PTT:30.0 sec    CULTURES:      Physical Examination:    General: No acute distress.  Alert, oriented, interactive, nonfocal    HEENT: Pupils equal, reactive to light.  Symmetric.    PULM: Clear to auscultation bilaterally, no significant sputum production    CVS: Regular rate and rhythm, no murmurs, rubs, or gallops    ABD: Soft, nondistended, nontender, normoactive bowel sounds, no masses    EXT: No edema, nontender    SKIN: Warm and well perfused, no rashes noted.    NEURO: A&Ox3, strength 5/5 all extremities, cranial nerves grossly intact, no focal deficits    RADIOLOGY: ***    CRITICAL CARE TIME SPENT: ***  Evaluating/treating patient, reviewing data/labs/imaging, discussing case with multidisciplinary team, discussing plan/goals of care with patient/family. Non-inclusive of procedure time. Patient is a 58y old  Female who presents with a chief complaint of SOB (31 Dec 2018 19:12)      BRIEF HOSPITAL COURSE:  57 y/o female pmhx ESRD ( HD MWF), DM2,  systolic heart failure, CAD pafib no AC due hx GI bleed, pericardial effusion, chronic right pleural effusion presented to ED w/ SOB x 3days.  Admitted w/ acute respiratory failure related to right sided pleural effusion, found to be influenza A positive.  CT surgery placed right sided pigtail catheter.  MICU consulted for increased WOB.     Pt seen and examined w/ o2 sat 96% on 3L NC w/ RR 28-32. She was sleeping appearing comfortable. She deneis any complaints at this time. R CT draining.           PAST MEDICAL & SURGICAL HISTORY:  Coronary artery disease, angina presence unspecified, unspecified vessel or lesion type, unspecified whether native or transplanted heart  Paroxysmal atrial fibrillation  Anemia due to chronic kidney disease, unspecified CKD stage  Chronic systolic congestive heart failure  Pleural effusion  Pericardial effusion  End stage renal disease on dialysis  HLD (hyperlipidemia)  HTN (hypertension)  DM (diabetes mellitus)  A-V fistula  Encounter for dialysis catheter care    Allergies    No Known Allergies    Intolerances      FAMILY HISTORY:  Family history of kidney disease in brother (Sibling)      Review of Systems:  CONSTITUTIONAL: No fever, chills, or fatigue  EYES: No eye pain, visual disturbances, or discharge  ENMT:  No difficulty hearing, tinnitus, vertigo; No sinus or throat pain  NECK: No pain or stiffness  RESPIRATORY: No cough, wheezing, chills or hemoptysis; No shortness of breath  CARDIOVASCULAR: No chest pain, palpitations, dizziness, or leg swelling  GASTROINTESTINAL: No abdominal or epigastric pain. No nausea, vomiting, or hematemesis; No diarrhea or constipation. No melena or hematochezia.  GENITOURINARY: No dysuria, frequency, hematuria, or incontinence  NEUROLOGICAL: No headaches, memory loss, loss of strength, numbness, or tremors  SKIN: No itching, burning, rashes, or lesions   MUSCULOSKELETAL: No joint pain or swelling; No muscle, back, or extremity pain  PSYCHIATRIC: No depression, anxiety, mood swings, or difficulty sleeping      Medications:  oseltamivir 30 milliGRAM(s) Oral <User Schedule>    carvedilol 12.5 milliGRAM(s) Oral every 12 hours  sacubitril 97 mG/valsartan 103 mG 1 Tablet(s) Oral two times a day          aspirin enteric coated 81 milliGRAM(s) Oral daily    docusate sodium 100 milliGRAM(s) Oral two times a day  pantoprazole    Tablet 40 milliGRAM(s) Oral before breakfast  senna 2 Tablet(s) Oral at bedtime PRN      dextrose 40% Gel 15 Gram(s) Oral once PRN  dextrose 50% Injectable 12.5 Gram(s) IV Push once  dextrose 50% Injectable 25 Gram(s) IV Push once  dextrose 50% Injectable 25 Gram(s) IV Push once  glucagon  Injectable 1 milliGRAM(s) IntraMuscular once PRN  insulin glargine Injectable (LANTUS) 10 Unit(s) SubCutaneous at bedtime  insulin lispro (HumaLOG) corrective regimen sliding scale   SubCutaneous three times a day before meals  insulin lispro (HumaLOG) corrective regimen sliding scale   SubCutaneous at bedtime    dextrose 5%. 1000 milliLiter(s) IV Continuous <Continuous>                ICU Vital Signs Last 24 Hrs  T(C): 38.4 (31 Dec 2018 21:51), Max: 39 (31 Dec 2018 10:18)  T(F): 101.2 (31 Dec 2018 21:51), Max: 102.2 (31 Dec 2018 10:18)  HR: 83 (31 Dec 2018 21:51) (80 - 91)  BP: 146/72 (31 Dec 2018 21:51) (123/71 - 190/80)  BP(mean): --  ABP: --  ABP(mean): --  RR: 32 (31 Dec 2018 21:51) (18 - 34)  SpO2: 96% (31 Dec 2018 21:51) (88% - 100%)    Vital Signs Last 24 Hrs  T(C): 38.4 (31 Dec 2018 21:51), Max: 39 (31 Dec 2018 10:18)  T(F): 101.2 (31 Dec 2018 21:51), Max: 102.2 (31 Dec 2018 10:18)  HR: 83 (31 Dec 2018 21:51) (80 - 91)  BP: 146/72 (31 Dec 2018 21:51) (123/71 - 190/80)  BP(mean): --  RR: 32 (31 Dec 2018 21:51) (18 - 34)  SpO2: 96% (31 Dec 2018 21:51) (88% - 100%)    ABG - ( 31 Dec 2018 22:08 )  pH, Arterial: 7.45  pH, Blood: x     /  pCO2: 47    /  pO2: 63    / HCO3: 32    / Base Excess: 7.8   /  SaO2: 94                  I&O's Detail        LABS:                        9.1    4.0   )-----------( 216      ( 31 Dec 2018 10:31 )             30.1     12-31    137  |  92<L>  |  26.0<H>  ----------------------------<  254<H>  4.9   |  30.0<H>  |  3.62<H>    Ca    9.6      31 Dec 2018 10:31    TPro  8.5  /  Alb  4.4  /  TBili  0.9  /  DBili  x   /  AST  43<H>  /  ALT  19  /  AlkPhos  161<H>  12-31      CARDIAC MARKERS ( 31 Dec 2018 10:31 )  x     / 0.55 ng/mL / x     / x     / x          CAPILLARY BLOOD GLUCOSE        PT/INR - ( 31 Dec 2018 10:31 )   PT: 15.8 sec;   INR: 1.36 ratio         PTT - ( 31 Dec 2018 10:31 )  PTT:30.0 sec    CULTURES:      Physical Examination:    General:  AAOx3. No acute distress     HEENT: Pupils equal, reactive to light.  Symmetric.    PULM:  diminished breath sounds on the right. No wheeze/rales/rhonchi. Mild respiratory distress     CVS: Regular rate and rhythm, no murmurs, rubs, or gallops. +S1/S2.     ABD: Soft, nondistended, nontender, normoactive bowel sounds, no masses    EXT: No edema, nontender    SKIN: Warm and well perfused, no rashes noted.    NEURO: A&Ox3, follows commands, able to move all extremities.     RADIOLOGY: < from: CT Chest No Cont (12.31.18 @ 18:59) >   EXAM:  CT CHEST                          PROCEDURE DATE:  12/31/2018          INTERPRETATION:  Clinical information: Shortness of breath. Follow-up for   effusion. Rule out infiltrate.    COMPARISON: November 21, 2018    PROCEDURE:   CT of the Chest was performed without intravenous contrast.  Sagittal and coronal reformats were performed.    FINDINGS:    LOWER NECK: Within normal limits.  AXILLA, MEDIASTINUM AND YOLY: Calcified right paratracheal lymph nodes   compatible. 1.3 cm right lower paratracheal lymph node (3; 45).  VESSELS: Atherosclerotic arterial calcifications, including the coronary   arteries.  Normal caliber aorta.  HEART: The heart is moderately enlarged.  No pericardial effusion.     PLEURA: Small right pleural effusion, partially loculated, significantly   decreased. Unchanged small left pleural effusion. Small anterior   pneumothorax. Right pleural catheter in place.  LUNGS AND LARGE AIRWAYS: Extensive right perihilar and patchy left upper   and lower lobe consolidation. Diffuse bilateral groundglass opacity.  VISUALIZED UPPER ABDOMEN: Small amount of ascites. Incompletely imaged   hypodense left renal lesion, not adequately characterized.  BONES: No acute abnormality.  CHEST WALL:  Unremarkable    IMPRESSION: Significant decrease in right pleural effusion, as compared   with CT abdomen/pelvis from earlier today. Small right pneumothorax.     Small residual partially loculated right pleural effusion.    Small left pleural effusion.    Extensive bilateral consolidations and groundglass opacities most likely   due to pulmonary edema. Pneumonia is also in the differential diagnosis.    Ascites.      < end of copied text >      TIME SPENT: 32 min   Evaluating/treating patient, reviewing data/labs/imaging, discussing case with multidisciplinary team, discussing plan/goals of care with patient/family. Non-inclusive of procedure time.

## 2018-12-31 NOTE — ED STATDOCS - PROGRESS NOTE DETAILS
59 y/o F pt with hx of Diabetes, HTN, ESRD, HTN, CAD, chronic cough  presents to ED c/o weakness, dizziness, chest pain, back pain, hemoptysis. pt got dialysis yesterday. 02 dependant at home.

## 2018-12-31 NOTE — H&P ADULT - NSHPPHYSICALEXAM_GEN_ALL_CORE
ICU Vital Signs Last 24 Hrs  T(C): 37.6 (31 Dec 2018 13:16), Max: 39 (31 Dec 2018 10:18)  T(F): 99.6 (31 Dec 2018 13:16), Max: 102.2 (31 Dec 2018 10:18)  HR: 88 (31 Dec 2018 13:16) (86 - 91)  BP: 160/70 (31 Dec 2018 13:16) (160/70 - 190/80)  BP(mean): --  ABP: --  ABP(mean): --  RR: 20 (31 Dec 2018 13:16) (18 - 22)  SpO2: 98% (31 Dec 2018 13:16) (88% - 98%)

## 2018-12-31 NOTE — CONSULT NOTE ADULT - SUBJECTIVE AND OBJECTIVE BOX
Patient is a 58y old  Female who presents with a chief complaint of SOB (31 Dec 2018 16:34)      HPI:  This is 59 Y/O woman with  PMHx of ESRD (on HD MWF), NICM w HFrEF, Dm2 on insulin, pafib no a/c due to GI bleed, pericardial effusion, chronic right pleural effusion presents for shortness of breath, worsening for 3 days. associated with cough productive of clear sputum. On arrival she was noted to have fever 102, RVP positive for flu, chest xray showed Increased right pleural effusion and airspace disease in  right mid and lower lung field. Pt report feeling warm at home, no documented fever associated with chills. Pt report chest discomfort associated with cough.     Denied palpitation, abd. pain, nausea, vomiting, headache, dizziness, numbness, tingling, urinary and bowel complaints. (31 Dec 2018 16:34)    Had HD yesterday . + R/I for Influ A and RVP       PAST MEDICAL & SURGICAL HISTORY:  Coronary artery disease, angina presence unspecified, unspecified vessel or lesion type, unspecified whether native or transplanted heart  Paroxysmal atrial fibrillation  Anemia due to chronic kidney disease, unspecified CKD stage  Chronic systolic congestive heart failure  Pleural effusion  Pericardial effusion  End stage renal disease on dialysis  HLD (hyperlipidemia)  HTN (hypertension)  DM (diabetes mellitus)  A-V fistula  Encounter for dialysis catheter care      FAMILY HISTORY:  Family history of kidney disease in brother (Sibling)      Social History:    MEDICATIONS  (STANDING):  aspirin enteric coated 81 milliGRAM(s) Oral daily  carvedilol 12.5 milliGRAM(s) Oral every 12 hours  dextrose 5%. 1000 milliLiter(s) (50 mL/Hr) IV Continuous <Continuous>  dextrose 50% Injectable 12.5 Gram(s) IV Push once  dextrose 50% Injectable 25 Gram(s) IV Push once  dextrose 50% Injectable 25 Gram(s) IV Push once  docusate sodium 100 milliGRAM(s) Oral two times a day  insulin glargine Injectable (LANTUS) 10 Unit(s) SubCutaneous at bedtime  insulin lispro (HumaLOG) corrective regimen sliding scale   SubCutaneous three times a day before meals  insulin lispro (HumaLOG) corrective regimen sliding scale   SubCutaneous at bedtime  oseltamivir 30 milliGRAM(s) Oral <User Schedule>  pantoprazole    Tablet 40 milliGRAM(s) Oral before breakfast  sacubitril 97 mG/valsartan 103 mG 1 Tablet(s) Oral two times a day    MEDICATIONS  (PRN):  dextrose 40% Gel 15 Gram(s) Oral once PRN Blood Glucose LESS THAN 70 milliGRAM(s)/deciliter  glucagon  Injectable 1 milliGRAM(s) IntraMuscular once PRN Glucose LESS THAN 70 milligrams/deciliter  senna 2 Tablet(s) Oral at bedtime PRN Constipation      Allergies    No Known Allergies    Intolerances      Vital Signs Last 24 Hrs  T(C): 37.6 (31 Dec 2018 13:16), Max: 39 (31 Dec 2018 10:18)  T(F): 99.6 (31 Dec 2018 13:16), Max: 102.2 (31 Dec 2018 10:18)  HR: 88 (31 Dec 2018 13:16) (86 - 91)  BP: 160/70 (31 Dec 2018 13:16) (160/70 - 190/80)  BP(mean): --  RR: 20 (31 Dec 2018 13:16) (18 - 22)  SpO2: 98% (31 Dec 2018 13:16) (88% - 98%)  Daily     Daily   I&O's Detail    I&O's Summary      PHYSICAL EXAM:  GENERAL: Thin, frail  HEAD:  NCAT  NECK: Supple, +JVD  NERVOUS SYSTEM:  Alert & Oriented X3  CHEST/LUNG:EAE , Coarse BS , Some dec BS R base   HEART: Regular rate and rhythm; No rub  ABDOMEN: Soft, Nontender, + ascites   EXTREMITIES:  ++ edema         LABS:                        9.1    4.0   )-----------( 216      ( 31 Dec 2018 10:31 )             30.1     12-31    137  |  92<L>  |  26.0<H>  ----------------------------<  254<H>  4.9   |  30.0<H>  |  3.62<H>    Ca    9.6      31 Dec 2018 10:31    TPro  8.5  /  Alb  4.4  /  TBili  0.9  /  DBili  x   /  AST  43<H>  /  ALT  19  /  AlkPhos  161<H>  12-31    PT/INR - ( 31 Dec 2018 10:31 )   PT: 15.8 sec;   INR: 1.36 ratio         PTT - ( 31 Dec 2018 10:31 )  PTT:30.0 sec            RADIOLOGY & ADDITIONAL TESTS:

## 2018-12-31 NOTE — H&P ADULT - HISTORY OF PRESENT ILLNESS
This is 57 Y/O woman with  PMHx of ESRD (on HD MWF), NICM w HFrEF, Dm2 on insulin, pafib no a/c due to GI bleed, pericardial effusion, chronic right pleural effusion presents for shortness of breath, worsening for 3 days. associated with cough productive of clear sputum. On arrival she was noted to have fever 102, RVP positive for flu, chest xray showed Increased right pleural effusion and airspace disease in  right mid and lower lung field. Pt report feeling warm at home, no documented fever associated with chills. Pt report chest discomfort associated with cough.     Denied palpitation, abd. pain, nausea, vomiting, headache, dizziness, numbness, tingling, urinary and bowel complaints.

## 2018-12-31 NOTE — PROCEDURE NOTE - NSPROCDETAILS_GEN_ALL_CORE
secured in place/percutaneous/Seldinger technique/sterile dressing applied/dressing applied/ultrasound assessment of fluid (size)/ultrasound assessment of fluid (location)

## 2018-12-31 NOTE — ED ADULT NURSE REASSESSMENT NOTE - NS ED NURSE REASSESS COMMENT FT1
patient with thick blood tingled sputum Dr. Courtney made aware and will come to see patient  Iso precautions maintained patient with thick blood tingled sputum Dr. Courtney made aware and will come to see patient  Iso precautions maintained.pt with right sided chest patient to suction with straw colored drainage noted

## 2018-12-31 NOTE — CONSULT NOTE ADULT - ASSESSMENT
ESRD: + R pleural effusion ---> S/P prior drainage , + CM ----> Feels better   Next HD Wed   Will  need repeat drainage   Thoracic surgery to see          Anemia: +CRF  - Hgb better   - Epogen with HD       RO: Was on CaCO3 and rocaltrol , follow     Recent outpt + Hep C Ab -----> Recent PCR here was negative     Influ A , RSV .   Renal dose Tamiflu  CT Chest to be done

## 2018-12-31 NOTE — CHART NOTE - NSCHARTNOTEFT_GEN_A_CORE
KRIS Sue called to come reevaluate patient w/ hemoptysis v frothy pink sputum     HPI 58 F PmHx ESRD on HR with NICM HFrEF, PAF no a/c secondary to previous gi bleed presenting with cough found to be febrile +RVP flu cxr w/ r effusion now s/p chest tube roughly -700 straw colored fluid out    Patient seen and evaluated; with blue emesis bag with sputum with blood streaking/ frothy in appearance   No acute distress currently laying flat on nasal cannula satting well  Hemodynamically stable     CT chest   < from: CT Chest No Cont (12.31.18 @ 18:59) >    IMPRESSION: Significant decrease in right pleural effusion, as compared   with CT abdomen/pelvis from earlier today. Small right pneumothorax.     Small residual partially loculated right pleural effusion.    Small left pleural effusion.    Extensive bilateral consolidations and groundglass opacities most likely   due to pulmonary edema. Pneumonia is also in the differential diagnosis.    Ascites.    < end of copied text >      A/P   d/w dr hui   patient with +flu c/o cough now with irritation to airways   consider humidified treatments and cough suppressant to decrease irritation   also with pulmonary edema on CT scan and hx of systolic heart failure complicated by ESRD; would continue management for heart failure as tolerated   Chest tube to waterseal   will continue to follow

## 2018-12-31 NOTE — ED PROVIDER NOTE - MEDICAL DECISION MAKING DETAILS
Pt with SOB multifactorial, likely due to flu, pna, and effusion. cardio has sseen, ekg unchanged, no chest pain, and the pt previously has had a trop at 0.55 inbetween dialysis (9/2018). cardio advises just trending at this time

## 2018-12-31 NOTE — H&P ADULT - ASSESSMENT
This is 59 Y/O woman with  PMHx of ESRD (on HD MWF), NICM w HFrEF, Dm2 on insulin, pafib no a/c due to GI bleed, pericardial effusion, chronic right pleural effusion presents for shortness of breath, worsening for 3 days. associated with cough productive of clear sputum. On arrival she was noted to have fever 102, RVP positive for flu, chest xray showed Increased right pleural effusion and airspace disease in  right mid and lower lung field. Pt report feeling warm at home, no documented fever associated with chills. Pt report chest discomfort associated with cough.     A/P    >SOB - multifactorial - pleural effusion and flu   admit to medicine   Ct surgery consult requested   Right thoracentesis with ultrasound guidance by radiologist  11/23/2018  HD   start Tamiflu   Pt received IV Rocephin and azithromycin in ED, pneumonia unlikely - will do CT chest to pleural effusion and r/o infiltrate     >ESRD on HD - renal consulted - M-W-F  last HD yesterday     >CAd - troponin 0.55  appreciate cardiology input   c/w home medication  serial troponin   had recent TTE - EF 50-55%    >DM- c/w Lantus and sliding scale     >Chronic anemia due to ESRD   f/u cbc, iron panel    >DVt PPX _ SCD This is 59 Y/O woman with  PMHx of ESRD (on HD MWF), NICM w HFrEF, Dm2 on insulin, pafib no a/c due to GI bleed, pericardial effusion, chronic right pleural effusion presents for shortness of breath, worsening for 3 days. associated with cough productive of clear sputum. On arrival she was noted to have fever 102, RVP positive for flu, chest xray showed Increased right pleural effusion and airspace disease in  right mid and lower lung field. Pt report feeling warm at home, no documented fever associated with chills. Pt report chest discomfort associated with cough.     A/P    >SOB - multifactorial - pleural effusion and flu   admit to medicine   Ct surgery consult requested   Right thoracentesis with ultrasound guidance by radiologist  11/23/2018  HD   start Tamiflu   Pt received IV Rocephin and azithromycin in ED, pneumonia unlikely - will do CT chest to pleural effusion and r/o infiltrate     >fever - likely due to flu, rule out  pneumonia   Pt received IV Rocephin and azithromycin in ED, pneumonia unlikely - will do CT chest to pleural effusion and r/o infiltrate   f/u blood c/s    >ESRD on HD - renal consulted - M-W-F  last HD yesterday     >CAd - troponin 0.55  appreciate cardiology input   c/w home medication  serial troponin   had recent TTE - EF 50-55%    >DM- c/w Lantus and sliding scale     >Chronic anemia due to ESRD   f/u cbc, iron panel    >DVt PPX _ SCD

## 2018-12-31 NOTE — ED PROVIDER NOTE - OBJECTIVE STATEMENT
58 year old female with PMH ESRD on HD MWF (dialyzed yesterday though instead due to holiday), HTN, HLD, DM presents with vomiting and SOB. pt states that she has had a productive cough x 1 week. She states that beginning yesterday she began to feel nausea, have multiple episodes of non-bloody, non-bilious vomiting, and feel genralized weakness. She Reports feelign SOB constant, worse with layign down and with exertion. She states that she has chest soreness ONLY when coughing, and does not have chest pain currently or at rest. Reports associated subjective fevers, chills, but no abd pain, diarrhea, leg swelling.

## 2019-01-01 DIAGNOSIS — I50.23 ACUTE ON CHRONIC SYSTOLIC (CONGESTIVE) HEART FAILURE: ICD-10-CM

## 2019-01-01 DIAGNOSIS — J81.0 ACUTE PULMONARY EDEMA: ICD-10-CM

## 2019-01-01 DIAGNOSIS — J10.1 INFLUENZA DUE TO OTHER IDENTIFIED INFLUENZA VIRUS WITH OTHER RESPIRATORY MANIFESTATIONS: ICD-10-CM

## 2019-01-01 DIAGNOSIS — N18.6 END STAGE RENAL DISEASE: ICD-10-CM

## 2019-01-01 LAB
ALBUMIN FLD-MCNC: 1.7 G/DL — SIGNIFICANT CHANGE UP
ALBUMIN SERPL ELPH-MCNC: 3 G/DL — LOW (ref 3.3–5.2)
ANION GAP SERPL CALC-SCNC: 14 MMOL/L — SIGNIFICANT CHANGE UP (ref 5–17)
BUN SERPL-MCNC: 45 MG/DL — HIGH (ref 8–20)
CALCIUM SERPL-MCNC: 8.4 MG/DL — LOW (ref 8.6–10.2)
CHLORIDE SERPL-SCNC: 91 MMOL/L — LOW (ref 98–107)
CO2 SERPL-SCNC: 29 MMOL/L — SIGNIFICANT CHANGE UP (ref 22–29)
CREAT SERPL-MCNC: 5.1 MG/DL — HIGH (ref 0.5–1.3)
FERRITIN SERPL-MCNC: 716 NG/ML — HIGH (ref 15–150)
FOLATE SERPL-MCNC: 10.1 NG/ML — SIGNIFICANT CHANGE UP
GLUCOSE BLDC GLUCOMTR-MCNC: 132 MG/DL — HIGH (ref 70–99)
GLUCOSE BLDC GLUCOMTR-MCNC: 140 MG/DL — HIGH (ref 70–99)
GLUCOSE BLDC GLUCOMTR-MCNC: 159 MG/DL — HIGH (ref 70–99)
GLUCOSE BLDC GLUCOMTR-MCNC: 164 MG/DL — HIGH (ref 70–99)
GLUCOSE BLDC GLUCOMTR-MCNC: 182 MG/DL — HIGH (ref 70–99)
GLUCOSE FLD-MCNC: 249 MG/DL — SIGNIFICANT CHANGE UP
GLUCOSE SERPL-MCNC: 149 MG/DL — HIGH (ref 70–115)
HBA1C BLD-MCNC: 6.2 % — HIGH (ref 4–5.6)
HCT VFR BLD CALC: 28.4 % — LOW (ref 37–47)
HGB BLD-MCNC: 8.4 G/DL — LOW (ref 12–16)
IRON SATN MFR SERPL: 16 UG/DL — LOW (ref 37–145)
IRON SATN MFR SERPL: 8 % — LOW (ref 14–50)
LDH SERPL L TO P-CCNC: 132 U/L — SIGNIFICANT CHANGE UP
LDH SERPL L TO P-CCNC: 176 U/L — SIGNIFICANT CHANGE UP (ref 98–192)
MCHC RBC-ENTMCNC: 28.1 PG — SIGNIFICANT CHANGE UP (ref 27–31)
MCHC RBC-ENTMCNC: 29.6 G/DL — LOW (ref 32–36)
MCV RBC AUTO: 95 FL — SIGNIFICANT CHANGE UP (ref 81–99)
PLATELET # BLD AUTO: 193 K/UL — SIGNIFICANT CHANGE UP (ref 150–400)
POTASSIUM SERPL-MCNC: 5.1 MMOL/L — SIGNIFICANT CHANGE UP (ref 3.5–5.3)
POTASSIUM SERPL-SCNC: 5.1 MMOL/L — SIGNIFICANT CHANGE UP (ref 3.5–5.3)
PROT FLD-MCNC: 4.1 G/DL — SIGNIFICANT CHANGE UP
PROT SERPL-MCNC: 6 G/DL — LOW (ref 6.6–8.7)
RBC # BLD: 2.99 M/UL — LOW (ref 4.4–5.2)
RBC # FLD: 15.7 % — HIGH (ref 11–15.6)
SODIUM SERPL-SCNC: 134 MMOL/L — LOW (ref 135–145)
TIBC SERPL-MCNC: 192 UG/DL — LOW (ref 220–430)
TRANSFERRIN SERPL-MCNC: 134 MG/DL — LOW (ref 192–382)
VIT B12 SERPL-MCNC: 1097 PG/ML — SIGNIFICANT CHANGE UP (ref 232–1245)
WBC # BLD: 2.5 K/UL — LOW (ref 4.8–10.8)
WBC # FLD AUTO: 2.5 K/UL — LOW (ref 4.8–10.8)

## 2019-01-01 PROCEDURE — 99233 SBSQ HOSP IP/OBS HIGH 50: CPT

## 2019-01-01 PROCEDURE — 99222 1ST HOSP IP/OBS MODERATE 55: CPT

## 2019-01-01 RX ADMIN — Medication 100 MILLIGRAM(S): at 05:26

## 2019-01-01 RX ADMIN — Medication 2: at 07:55

## 2019-01-01 RX ADMIN — Medication 100 MILLIGRAM(S): at 18:12

## 2019-01-01 RX ADMIN — INSULIN GLARGINE 10 UNIT(S): 100 INJECTION, SOLUTION SUBCUTANEOUS at 21:11

## 2019-01-01 RX ADMIN — SACUBITRIL AND VALSARTAN 1 TABLET(S): 24; 26 TABLET, FILM COATED ORAL at 05:27

## 2019-01-01 RX ADMIN — CARVEDILOL PHOSPHATE 12.5 MILLIGRAM(S): 80 CAPSULE, EXTENDED RELEASE ORAL at 18:12

## 2019-01-01 RX ADMIN — INSULIN GLARGINE 10 UNIT(S): 100 INJECTION, SOLUTION SUBCUTANEOUS at 04:06

## 2019-01-01 RX ADMIN — CARVEDILOL PHOSPHATE 12.5 MILLIGRAM(S): 80 CAPSULE, EXTENDED RELEASE ORAL at 05:26

## 2019-01-01 RX ADMIN — SACUBITRIL AND VALSARTAN 1 TABLET(S): 24; 26 TABLET, FILM COATED ORAL at 18:12

## 2019-01-01 RX ADMIN — PANTOPRAZOLE SODIUM 40 MILLIGRAM(S): 20 TABLET, DELAYED RELEASE ORAL at 05:27

## 2019-01-01 RX ADMIN — Medication 81 MILLIGRAM(S): at 18:12

## 2019-01-01 NOTE — CONSULT NOTE ADULT - SUBJECTIVE AND OBJECTIVE BOX
[facility]  INFECTIOUS DISEASES AND INTERNAL MEDICINE at [place]  =======================================================  Jose Carlos Servin MD  Diplomates American Board of Internal Medicine and Infectious Diseases  =======================================================    [id number]  JAROCHO MORALES   This is a 58y  Female with ESRD (on HD MWF), NICM w HFrEF, Dm2 on insulin, PAfib no a/c due to GI bleed, pericardial effusion, chronic right pleural effusion presents for shortness of breath, worsening for 3 days. associated with cough productive of clear sputum.   No sick contacts, no travels. LIVES WITH SON AND DAUGHTER.  + CHILLS, sweats, rigors and muscle aches at home.   found with fever 102,   RVP positive for Influenza A  Chest xray showed:   Increased right pleural effusion and airspace disease in  right mid and lower lung field.  CT surgery was consulted.  Pt s/p RIGHT chest tube.  CT scan after chest tube showed    Significant decrease in right pleural effusion; Small right pneumothorax; Small residual partially loculated right pleural effusion.  Extensive bilateral consolidations and groundglass opacities most likely due to pulmonary edema.     patient started on Tamiflu, dose adjusted for dialysis.  She was also given Ceftriaxone and azithromycin by admitting team.     =======================================================  Past Medical & Surgical Hx:  =====================  PAST MEDICAL & SURGICAL HISTORY:  Coronary artery disease, angina presence unspecified, unspecified vessel or lesion type, unspecified whether native or transplanted heart  Paroxysmal atrial fibrillation  Anemia due to chronic kidney disease, unspecified CKD stage  Chronic systolic congestive heart failure  Pleural effusion  Pericardial effusion  End stage renal disease on dialysis  HLD (hyperlipidemia)  HTN (hypertension)  DM (diabetes mellitus)  A-V fistula  Encounter for dialysis catheter care      Problem List:  ==========  HEALTH ISSUES - PROBLEM Dx:  Pleural effusion: Pleural effusion    Social Hx:  =======  no toxic habits currently    FAMILY HISTORY:  Family history of kidney disease in brother (Sibling)  no significant family history of immunosuppressive disorders in mother or father   =======================================================  REVIEW OF SYSTEMS:  as above  all other ROS negative    =======================================================  Allergies  No Known Allergies    Antibiotics:  oseltamivir 30 milliGRAM(s) Oral <User Schedule>    Other medications:  aspirin enteric coated 81 milliGRAM(s) Oral daily  carvedilol 12.5 milliGRAM(s) Oral every 12 hours  dextrose 5%. 1000 milliLiter(s) IV Continuous <Continuous>  dextrose 50% Injectable 12.5 Gram(s) IV Push once  dextrose 50% Injectable 25 Gram(s) IV Push once  dextrose 50% Injectable 25 Gram(s) IV Push once  docusate sodium 100 milliGRAM(s) Oral two times a day  insulin glargine Injectable (LANTUS) 10 Unit(s) SubCutaneous at bedtime  insulin lispro (HumaLOG) corrective regimen sliding scale   SubCutaneous three times a day before meals  insulin lispro (HumaLOG) corrective regimen sliding scale   SubCutaneous at bedtime  pantoprazole    Tablet 40 milliGRAM(s) Oral before breakfast  sacubitril 97 mG/valsartan 103 mG 1 Tablet(s) Oral two times a day     azithromycin  IVPB   255 mL/Hr IV Intermittent (12-31-18 @ 10:45)  cefTRIAXone   IVPB   100 mL/Hr IV Intermittent (12-31-18 @ 10:37)    oseltamivir   30 milliGRAM(s) Oral (12-31-18 @ 15:26)      ======================================================  Physical Exam:  ============  T(F): 98.3 (01 Jan 2019 07:09), Max: 102.8 (01 Jan 2019 03:16)  HR: 71 (01 Jan 2019 07:09)  BP: 111/69 (01 Jan 2019 07:09)  RR: 18 (01 Jan 2019 07:09)  SpO2: 92% (01 Jan 2019 07:09) (88% - 100%)    General:  No acute distress. older than stated age  Eye: Pupils are equal, round and reactive to light, Extraocular movements are intact, Normal conjunctiva.  HENT: Normocephalic, Oral mucosa is moist, No pharyngeal erythema, No sinus tenderness.  Neck: Supple, No lymphadenopathy.  Respiratory: Lungs with diminished air entry at bases  RIGHT chest tube with clear liquid output.   Cardiovascular: Normal rate, Regular rhythm, No murmur, Good pulses equal in all extremities, trace edema.  Gastrointestinal: Soft, Non-tender, Non-distended, Normal bowel sounds.  Genitourinary: No costovertebral angle tenderness.  Lymphatics: No lymphadenopathy neck,   Musculoskeletal: Normal range of motion, Normal strength.  Integumentary: No rash.  Neurologic: Alert, Oriented, No focal deficits, Cranial Nerves II-XII are grossly intact.  Psychiatric: Appropriate mood & affect.      =======================================================  Labs:  ====                          9.1    4.0   )-----------( 216      ( 31 Dec 2018 10:31 )             30.1     12-31    137  |  92<L>  |  26.0<H>  ----------------------------<  254<H>  4.9   |  30.0<H>  |  3.62<H>    Ca    9.6      31 Dec 2018 10:31    TPro  8.5  /  Alb  4.4  /  TBili  0.9  /  DBili  x   /  AST  43<H>  /  ALT  19  /  AlkPhos  161<H>  12-31      Rapid Respiratory Viral Panel (12.31.18 @ 11:35)    Rapid RVP Result: Detected: This Respiratory Panel uses polymerase chain reaction (PCR) to detect for adenovirus; coronavirus (HKU1, NL63, 229E, OC43); human metapneumovirus (hMPV); human enterovirus/rhinovirus (Entero/RV); influenza A; influenza A/H1; influenza A/H3; influenza A/H1-2009; influenza B; parainfluenza viruses 1, 2, 3, 4; respiratory syncytial virus; Mycoplasma pneumoniae; and Chlamydophila pneumoniae.    Influenza A (RapRVP): Detected    Influenza AH1 2009 (RapRVP): Detected    Serum Pro-Brain Natriuretic Peptide (12.31.18 @ 10:31)    Serum Pro-Brain Natriuretic Peptide: >30692 pg/mL        Culture - Body Fluid with Gram Stain (collected 12-31-18 @ 18:03)  Source: .Body Fluid  Gram Stain (12-31-18 @ 19:32):    Few White blood cells    No organisms seen          < from: Xray Chest 1 View-PORTABLE IMMEDIATE (12.31.18 @ 11:55) >   EXAM:  XR CHEST PORTABLE IMMED 1V                          PROCEDURE DATE:  12/31/2018          INTERPRETATION:  History: Cough and chest pain    Technique:  AP portable    Comparisons:  Chest x-ray dated 11/27/2018    Findings:     Improved aeration at the left lung base. Increased density   in the right lower lung field compatible with increasing right pleural   effusion and airspace disease.  .    The pulmonary vasculature and aorta   are normal for age. Heart size is unremarkable.     The thorax is normal for age.    Impression: Increased right pleural effusion and airspace disease in   right mid and lower lung field.             NELIA COLLINS M.D., ATTENDING RADIOLOGIST  This document has been electronically signed. Dec 31 2018 11:57AM        < end of copied text >    < from: CT Chest No Cont (12.31.18 @ 18:59) >  IMPRESSION: Significant decrease in right pleural effusion, as compared   with CT abdomen/pelvis from earlier today. Small right pneumothorax.     Small residual partially loculated right pleural effusion.    Small left pleural effusion.    Extensive bilateral consolidations and groundglass opacities most likely   due to pulmonary edema. Pneumonia is also in the differential diagnosis.    Ascites.      < end of copied text >

## 2019-01-01 NOTE — CONSULT NOTE ADULT - ASSESSMENT
This  58  y.o. woman with ESRD (on HD MWF), NICM w HFrEF, chronic right pleural effusion   here with SOB     found with fevers  Influenza positive on testing  - continue 5 day treatment with Tamiflu after HD, on HD days.     right chest effusion s/p drainage.  - follow up cultures    bilateral lung infiltrates,  clinically favors pulmonary edema and not multifocal in view of her hx, and elevated BNP  - stopped all antibiotics for this.   - monitor clinically.     will follow

## 2019-01-02 LAB
ANION GAP SERPL CALC-SCNC: 15 MMOL/L — SIGNIFICANT CHANGE UP (ref 5–17)
BUN SERPL-MCNC: 54 MG/DL — HIGH (ref 8–20)
CALCIUM SERPL-MCNC: 7.8 MG/DL — LOW (ref 8.6–10.2)
CHLORIDE SERPL-SCNC: 92 MMOL/L — LOW (ref 98–107)
CO2 SERPL-SCNC: 27 MMOL/L — SIGNIFICANT CHANGE UP (ref 22–29)
CREAT SERPL-MCNC: 5.82 MG/DL — HIGH (ref 0.5–1.3)
GLUCOSE BLDC GLUCOMTR-MCNC: 108 MG/DL — HIGH (ref 70–99)
GLUCOSE BLDC GLUCOMTR-MCNC: 149 MG/DL — HIGH (ref 70–99)
GLUCOSE BLDC GLUCOMTR-MCNC: 166 MG/DL — HIGH (ref 70–99)
GLUCOSE BLDC GLUCOMTR-MCNC: 188 MG/DL — HIGH (ref 70–99)
GLUCOSE SERPL-MCNC: 108 MG/DL — SIGNIFICANT CHANGE UP (ref 70–115)
HCT VFR BLD CALC: 27.8 % — LOW (ref 37–47)
HGB BLD-MCNC: 8.5 G/DL — LOW (ref 12–16)
MAGNESIUM SERPL-MCNC: 2.3 MG/DL — SIGNIFICANT CHANGE UP (ref 1.6–2.6)
MCHC RBC-ENTMCNC: 28.5 PG — SIGNIFICANT CHANGE UP (ref 27–31)
MCHC RBC-ENTMCNC: 30.6 G/DL — LOW (ref 32–36)
MCV RBC AUTO: 93.3 FL — SIGNIFICANT CHANGE UP (ref 81–99)
PLATELET # BLD AUTO: 203 K/UL — SIGNIFICANT CHANGE UP (ref 150–400)
POTASSIUM SERPL-MCNC: 4.7 MMOL/L — SIGNIFICANT CHANGE UP (ref 3.5–5.3)
POTASSIUM SERPL-SCNC: 4.7 MMOL/L — SIGNIFICANT CHANGE UP (ref 3.5–5.3)
RBC # BLD: 2.98 M/UL — LOW (ref 4.4–5.2)
RBC # FLD: 15.2 % — SIGNIFICANT CHANGE UP (ref 11–15.6)
SODIUM SERPL-SCNC: 134 MMOL/L — LOW (ref 135–145)
T4 FREE SERPL-MCNC: 1.2 NG/DL — SIGNIFICANT CHANGE UP (ref 0.9–1.8)
WBC # BLD: 2.8 K/UL — LOW (ref 4.8–10.8)
WBC # FLD AUTO: 2.8 K/UL — LOW (ref 4.8–10.8)

## 2019-01-02 PROCEDURE — 99232 SBSQ HOSP IP/OBS MODERATE 35: CPT

## 2019-01-02 PROCEDURE — 71045 X-RAY EXAM CHEST 1 VIEW: CPT | Mod: 26

## 2019-01-02 RX ORDER — CALCITRIOL 0.5 UG/1
0.25 CAPSULE ORAL DAILY
Qty: 0 | Refills: 0 | Status: DISCONTINUED | OUTPATIENT
Start: 2019-01-02 | End: 2019-01-07

## 2019-01-02 RX ORDER — CALCIUM CARBONATE 500(1250)
1 TABLET ORAL
Qty: 0 | Refills: 0 | Status: DISCONTINUED | OUTPATIENT
Start: 2019-01-02 | End: 2019-01-07

## 2019-01-02 RX ORDER — ERYTHROPOIETIN 10000 [IU]/ML
10000 INJECTION, SOLUTION INTRAVENOUS; SUBCUTANEOUS ONCE
Qty: 0 | Refills: 0 | Status: COMPLETED | OUTPATIENT
Start: 2019-01-02 | End: 2019-01-02

## 2019-01-02 RX ORDER — CHLORHEXIDINE GLUCONATE 213 G/1000ML
1 SOLUTION TOPICAL
Qty: 0 | Refills: 0 | Status: DISCONTINUED | OUTPATIENT
Start: 2019-01-02 | End: 2019-01-07

## 2019-01-02 RX ORDER — HEPARIN SODIUM 5000 [USP'U]/ML
5000 INJECTION INTRAVENOUS; SUBCUTANEOUS EVERY 8 HOURS
Qty: 0 | Refills: 0 | Status: DISCONTINUED | OUTPATIENT
Start: 2019-01-02 | End: 2019-01-07

## 2019-01-02 RX ORDER — ACETAMINOPHEN 500 MG
650 TABLET ORAL ONCE
Qty: 0 | Refills: 0 | Status: COMPLETED | OUTPATIENT
Start: 2019-01-02 | End: 2019-01-02

## 2019-01-02 RX ADMIN — ERYTHROPOIETIN 10000 UNIT(S): 10000 INJECTION, SOLUTION INTRAVENOUS; SUBCUTANEOUS at 10:51

## 2019-01-02 RX ADMIN — Medication 650 MILLIGRAM(S): at 11:04

## 2019-01-02 RX ADMIN — Medication 30 MILLIGRAM(S): at 14:49

## 2019-01-02 RX ADMIN — HEPARIN SODIUM 5000 UNIT(S): 5000 INJECTION INTRAVENOUS; SUBCUTANEOUS at 22:45

## 2019-01-02 RX ADMIN — CARVEDILOL PHOSPHATE 12.5 MILLIGRAM(S): 80 CAPSULE, EXTENDED RELEASE ORAL at 17:48

## 2019-01-02 RX ADMIN — Medication 81 MILLIGRAM(S): at 14:48

## 2019-01-02 RX ADMIN — Medication 100 MILLIGRAM(S): at 17:48

## 2019-01-02 RX ADMIN — INSULIN GLARGINE 10 UNIT(S): 100 INJECTION, SOLUTION SUBCUTANEOUS at 22:45

## 2019-01-02 RX ADMIN — HEPARIN SODIUM 5000 UNIT(S): 5000 INJECTION INTRAVENOUS; SUBCUTANEOUS at 14:48

## 2019-01-02 RX ADMIN — Medication 1 TABLET(S): at 17:54

## 2019-01-02 RX ADMIN — PANTOPRAZOLE SODIUM 40 MILLIGRAM(S): 20 TABLET, DELAYED RELEASE ORAL at 06:05

## 2019-01-02 RX ADMIN — Medication 2: at 17:53

## 2019-01-02 RX ADMIN — SACUBITRIL AND VALSARTAN 1 TABLET(S): 24; 26 TABLET, FILM COATED ORAL at 17:48

## 2019-01-02 RX ADMIN — Medication 650 MILLIGRAM(S): at 11:35

## 2019-01-03 LAB
ANION GAP SERPL CALC-SCNC: 11 MMOL/L — SIGNIFICANT CHANGE UP (ref 5–17)
BUN SERPL-MCNC: 29 MG/DL — HIGH (ref 8–20)
CALCIUM SERPL-MCNC: 7.9 MG/DL — LOW (ref 8.6–10.2)
CHLORIDE SERPL-SCNC: 95 MMOL/L — LOW (ref 98–107)
CO2 SERPL-SCNC: 29 MMOL/L — SIGNIFICANT CHANGE UP (ref 22–29)
CREAT SERPL-MCNC: 4.1 MG/DL — HIGH (ref 0.5–1.3)
GLUCOSE BLDC GLUCOMTR-MCNC: 119 MG/DL — HIGH (ref 70–99)
GLUCOSE BLDC GLUCOMTR-MCNC: 142 MG/DL — HIGH (ref 70–99)
GLUCOSE BLDC GLUCOMTR-MCNC: 146 MG/DL — HIGH (ref 70–99)
GLUCOSE BLDC GLUCOMTR-MCNC: 157 MG/DL — HIGH (ref 70–99)
GLUCOSE SERPL-MCNC: 167 MG/DL — HIGH (ref 70–115)
HCT VFR BLD CALC: 30 % — LOW (ref 37–47)
HGB BLD-MCNC: 8.9 G/DL — LOW (ref 12–16)
MCHC RBC-ENTMCNC: 28 PG — SIGNIFICANT CHANGE UP (ref 27–31)
MCHC RBC-ENTMCNC: 29.7 G/DL — LOW (ref 32–36)
MCV RBC AUTO: 94.3 FL — SIGNIFICANT CHANGE UP (ref 81–99)
PLATELET # BLD AUTO: 208 K/UL — SIGNIFICANT CHANGE UP (ref 150–400)
POTASSIUM SERPL-MCNC: 4.5 MMOL/L — SIGNIFICANT CHANGE UP (ref 3.5–5.3)
POTASSIUM SERPL-SCNC: 4.5 MMOL/L — SIGNIFICANT CHANGE UP (ref 3.5–5.3)
RBC # BLD: 3.18 M/UL — LOW (ref 4.4–5.2)
RBC # FLD: 15 % — SIGNIFICANT CHANGE UP (ref 11–15.6)
SODIUM SERPL-SCNC: 135 MMOL/L — SIGNIFICANT CHANGE UP (ref 135–145)
WBC # BLD: 2.6 K/UL — LOW (ref 4.8–10.8)
WBC # FLD AUTO: 2.6 K/UL — LOW (ref 4.8–10.8)

## 2019-01-03 PROCEDURE — 71045 X-RAY EXAM CHEST 1 VIEW: CPT | Mod: 26

## 2019-01-03 PROCEDURE — 99223 1ST HOSP IP/OBS HIGH 75: CPT

## 2019-01-03 PROCEDURE — 99232 SBSQ HOSP IP/OBS MODERATE 35: CPT

## 2019-01-03 RX ADMIN — CARVEDILOL PHOSPHATE 12.5 MILLIGRAM(S): 80 CAPSULE, EXTENDED RELEASE ORAL at 16:27

## 2019-01-03 RX ADMIN — Medication 1 TABLET(S): at 16:27

## 2019-01-03 RX ADMIN — CALCITRIOL 0.25 MICROGRAM(S): 0.5 CAPSULE ORAL at 11:24

## 2019-01-03 RX ADMIN — CHLORHEXIDINE GLUCONATE 1 APPLICATION(S): 213 SOLUTION TOPICAL at 05:04

## 2019-01-03 RX ADMIN — SACUBITRIL AND VALSARTAN 1 TABLET(S): 24; 26 TABLET, FILM COATED ORAL at 16:27

## 2019-01-03 RX ADMIN — Medication 100 MILLIGRAM(S): at 05:03

## 2019-01-03 RX ADMIN — Medication 1 TABLET(S): at 05:03

## 2019-01-03 RX ADMIN — PANTOPRAZOLE SODIUM 40 MILLIGRAM(S): 20 TABLET, DELAYED RELEASE ORAL at 05:03

## 2019-01-03 RX ADMIN — CARVEDILOL PHOSPHATE 12.5 MILLIGRAM(S): 80 CAPSULE, EXTENDED RELEASE ORAL at 05:03

## 2019-01-03 RX ADMIN — Medication 81 MILLIGRAM(S): at 11:24

## 2019-01-03 RX ADMIN — SACUBITRIL AND VALSARTAN 1 TABLET(S): 24; 26 TABLET, FILM COATED ORAL at 05:03

## 2019-01-03 RX ADMIN — HEPARIN SODIUM 5000 UNIT(S): 5000 INJECTION INTRAVENOUS; SUBCUTANEOUS at 05:03

## 2019-01-03 RX ADMIN — Medication 100 MILLIGRAM(S): at 16:28

## 2019-01-03 RX ADMIN — HEPARIN SODIUM 5000 UNIT(S): 5000 INJECTION INTRAVENOUS; SUBCUTANEOUS at 14:21

## 2019-01-03 RX ADMIN — INSULIN GLARGINE 10 UNIT(S): 100 INJECTION, SOLUTION SUBCUTANEOUS at 21:37

## 2019-01-03 RX ADMIN — HEPARIN SODIUM 5000 UNIT(S): 5000 INJECTION INTRAVENOUS; SUBCUTANEOUS at 21:37

## 2019-01-03 NOTE — CHART NOTE - NSCHARTNOTEFT_GEN_A_CORE
59 y/o Kiswahili speaking female, with PMH of ESRD, DM, PAF, CHF with recent thoracentesis, presented with SOB, found to have right pleural effusion and +Flu. Now s/p PTC 12/31/8.     Upon bedside assessment pt presents sitting up in chair in NAD. Currently on 5L of oxygen via NC, satting 98%. Denies SOB, chest pain, n/v/d, chills.    Right PTC in place, neg. AL, Suction on with 350cc serosanguinous drainage/24 hours.    Plan:   - Continue care per primary team  - ID, pulm and cardiology following   - Cultures without growth at 48hrs; pathology pending   - Maintain pigtail today for high chest tube output; will reassess tomorrow  - Daily CXR  - Plan discussed with KRIS Hampton

## 2019-01-03 NOTE — CONSULT NOTE ADULT - SUBJECTIVE AND OBJECTIVE BOX
PULMONARY CONSULT NOTE      JAROCHO MORALES  [id number]    Patient is a 58y old  Female who presents with a chief complaint of SOB (02 Jan 2019 14:42)      HISTORY OF PRESENT ILLNESS:    This is 59 Y/O woman with  PMHx of ESRD (on HD MWF), NICM w HFrEF, Dm2 on insulin, pafib no a/c due to GI bleed, pericardial effusion, chronic right pleural effusion presents for shortness of breath, worsening for 3 days. associated with cough productive of clear sputum. On arrival she was noted to have fever 102, RVP positive for flu, chest xray showed Increased right pleural effusion and airspace disease in  right mid and lower lung field. Pt report feeling warm at home, no documented fever associated with chills. Pt report chest discomfort associated with cough. CT surgery was consulted.  Pt s/p RIGHT chest tube.  CT scan after chest tube showed significant decrease in right pleural effusion; Small right pneumothorax; Small residual partially loculated right pleural effusion but with extensive bilateral consolidations and groundglass opacities most likely due to pulmonary edema though with airspace disease possibly related to viral pneumonia. Patient started on Tamiflu, dose adjusted for dialysis.  She was also given Ceftriaxone and azithromycin by admitting team but was d/c'd by ID. Prior effusions were c/w transudate but now with exudate. Cultures are pending. Patient now does feel better with decreased SOB and cough. She denies any smoking hx or h/o asthma or COPD.      MEDICATIONS  (STANDING):  aspirin enteric coated 81 milliGRAM(s) Oral daily  calcitriol   Capsule 0.25 MICROGram(s) Oral daily  calcium carbonate   1250 mG (OsCal) 1 Tablet(s) Oral two times a day  carvedilol 12.5 milliGRAM(s) Oral every 12 hours  chlorhexidine 2% Cloths 1 Application(s) Topical <User Schedule>  dextrose 5%. 1000 milliLiter(s) (50 mL/Hr) IV Continuous <Continuous>  dextrose 50% Injectable 12.5 Gram(s) IV Push once  dextrose 50% Injectable 25 Gram(s) IV Push once  dextrose 50% Injectable 25 Gram(s) IV Push once  docusate sodium 100 milliGRAM(s) Oral two times a day  heparin  Injectable 5000 Unit(s) SubCutaneous every 8 hours  insulin glargine Injectable (LANTUS) 10 Unit(s) SubCutaneous at bedtime  insulin lispro (HumaLOG) corrective regimen sliding scale   SubCutaneous three times a day before meals  insulin lispro (HumaLOG) corrective regimen sliding scale   SubCutaneous at bedtime  oseltamivir 30 milliGRAM(s) Oral <User Schedule>  pantoprazole    Tablet 40 milliGRAM(s) Oral before breakfast  sacubitril 97 mG/valsartan 103 mG 1 Tablet(s) Oral two times a day      MEDICATIONS  (PRN):  dextrose 40% Gel 15 Gram(s) Oral once PRN Blood Glucose LESS THAN 70 milliGRAM(s)/deciliter  glucagon  Injectable 1 milliGRAM(s) IntraMuscular once PRN Glucose LESS THAN 70 milligrams/deciliter  senna 2 Tablet(s) Oral at bedtime PRN Constipation      Allergies    No Known Allergies    Intolerances        PAST MEDICAL & SURGICAL HISTORY:  Coronary artery disease, angina presence unspecified, unspecified vessel or lesion type, unspecified whether native or transplanted heart  Paroxysmal atrial fibrillation  Anemia due to chronic kidney disease, unspecified CKD stage  Chronic systolic congestive heart failure  Pleural effusion  Pericardial effusion  End stage renal disease on dialysis  HLD (hyperlipidemia)  HTN (hypertension)  DM (diabetes mellitus)  A-V fistula  Encounter for dialysis catheter care      FAMILY HISTORY:  Family history of kidney disease in brother (Sibling)      SOCIAL HISTORY  Smoking History: Denies    REVIEW OF SYSTEMS:    CONSTITUTIONAL:  No fevers, chills, sweats    HEENT:  Eyes:  No diplopia or blurred vision. ENT:  No earache, sore throat or runny nose.    CARDIOVASCULAR:  No pressure, squeezing, tightness, or heaviness about the chest; no palpitations.    RESPIRATORY:  Per HPI    GASTROINTESTINAL:  No abdominal pain, nausea, vomiting or diarrhea.    GENITOURINARY:  No dysuria, frequency or urgency.    NEUROLOGIC:  No paresthesias, fasciculations, seizures or weakness.    PSYCHIATRIC:  No disorder of thought or mood.    Vital Signs Last 24 Hrs  T(C): 37.1 (02 Jan 2019 23:23), Max: 37.1 (02 Jan 2019 23:23)  T(F): 98.7 (02 Jan 2019 23:23), Max: 98.7 (02 Jan 2019 23:23)  HR: 76 (02 Jan 2019 23:23) (70 - 76)  BP: 132/72 (02 Jan 2019 23:23) (121/73 - 149/70)  BP(mean): --  RR: 20 (02 Jan 2019 23:23) (18 - 20)  SpO2: 92% (02 Jan 2019 23:23) (92% - 95%)    PHYSICAL EXAMINATION:    GENERAL: The patient is a well-developed, well-nourished female in no apparent distress.     HEENT: Head is normocephalic and atraumatic. Extraocular muscles are intact. Mucous membranes are moist.     NECK: Supple.     LUNGS: R>L rhonchi but without wheezing, rales. Respirations unlabored    HEART: Regular rate and rhythm without murmur.    ABDOMEN: Soft, nontender, and nondistended.  No hepatosplenomegaly is noted.    EXTREMITIES: Without any cyanosis, clubbing, rash, lesions or edema.    NEUROLOGIC: Grossly intact.      LABS:                        8.9    2.6   )-----------( 208      ( 03 Jan 2019 09:26 )             30.0     01-03    135  |  95<L>  |  29.0<H>  ----------------------------<  167<H>  4.5   |  29.0  |  4.10<H>    Ca    7.9<L>      03 Jan 2019 09:26  Mg     2.3     01-02    TPro  6.0<L>  /  Alb  3.0<L>  /  TBili  x   /  DBili  x   /  AST  x   /  ALT  x   /  AlkPhos  x   01-01        MICROBIOLOGY:    Rapid Respiratory Viral Panel (12.31.18 @ 11:35)    Rapid RVP Result: Detected: This Respiratory Panel uses polymerase chain reaction (PCR) to detect for  adenovirus; coronavirus (HKU1, NL63, 229E, OC43); human metapneumovirus  (hMPV); human enterovirus/rhinovirus (Entero/RV); influenza A; influenza  A/H1; influenza A/H3; influenza A/H1-2009; influenza B; parainfluenza  viruses 1, 2, 3, 4; respiratory syncytial virus; Mycoplasma pneumoniae;  and Chlamydophila pneumoniae.    Influenza A (RapRVP): Detected    Influenza AH1 2009 (RapRVP): Detected      Culture - Body Fluid with Gram Stain (12.31.18 @ 18:03)    Gram Stain:   Few White blood cells  No organisms seen    Specimen Source: .Body Fluid    Culture Results:   No growth at 2 days.  Culture in progress    Culture - Blood (12.31.18 @ 11:10)    Specimen Source: .Blood    Culture Results:   No growth at 48 hours    RADIOLOGY:       EXAM:  XR CHEST PORTABLE ROUTINE 1V                          PROCEDURE DATE:  01/02/2019          INTERPRETATION:  CHEST AP PORTABLE:    History: chf.     Date and time of exam: 1/2/2019 6:24 AM.    Technique: A single AP view of the chest was obtained.    Comparison exam: 12/31/2018.    Findings:  A right pigtail chest tube is noted unchanged in position since the prior   study. No evidence of pneumothorax. Decreasing right pleural effusion   since prior study. No change left pleural effusion. Increasing airspace   disease bilaterally since the prior exam. Persistent cardiomegaly..    Impression:  Decreasing right pleural effusion however increasing bilateral airspace   disease since the prior study..      TOOTIE STAPLES M.D., ATTENDING RADIOLOGIST  This document has been electronically signed. Jan 2 2019  9:52AM        	     EXAM:  CT CHEST                          PROCEDURE DATE:  12/31/2018          INTERPRETATION:  Clinical information: Shortness of breath. Follow-up for   effusion. Rule out infiltrate.    COMPARISON: November 21, 2018    PROCEDURE:   CT of the Chest was performed without intravenous contrast.  Sagittal and coronal reformats were performed.    FINDINGS:    LOWER NECK: Within normal limits.  AXILLA, MEDIASTINUM AND YOLY: Calcified right paratracheal lymph nodes   compatible. 1.3 cm right lower paratracheal lymph node (3; 45).  VESSELS: Atherosclerotic arterial calcifications, including the coronary   arteries.  Normal caliber aorta.  HEART: The heart is moderately enlarged.  No pericardial effusion.     PLEURA: Small right pleural effusion, partially loculated, significantly   decreased. Unchanged small left pleural effusion. Small anterior   pneumothorax. Right pleural catheter in place.  LUNGS AND LARGE AIRWAYS: Extensive right perihilar and patchy left upper   and lower lobe consolidation. Diffuse bilateral groundglass opacity.  VISUALIZED UPPER ABDOMEN: Small amount of ascites. Incompletely imaged   hypodense left renal lesion, not adequately characterized.  BONES: No acute abnormality.  CHEST WALL:  Unremarkable    IMPRESSION: Significant decrease in right pleural effusion, as compared   with CT abdomen/pelvis from earlier today. Small right pneumothorax.     Small residual partially loculated right pleural effusion.    Small left pleural effusion.    Extensive bilateral consolidations and groundglass opacities most likely   due to pulmonary edema. Pneumonia is also in the differential diagnosis.    Ascites.        SYLVIE DAILEY M.D., ATTENDING RADIOLOGIST  This document has been electronically signed. Dec 31 2018  7:32PM          ECHO 11/22/18:      Summary:   1. Left ventricular ejection fraction, byvisual estimation, is 50 to   55%.   2. Technically good study.   3. Normal global left ventricular systolic function.   4. LV Ejection Fraction by Cabrera's Method with a biplane EF of 56 %.   5. Normal left ventricular internal cavity size.   6. Spectral Doppler shows impaired relaxation pattern of left   ventricular myocardial filling (Grade I diastolic dysfunction).   7. There is mild concentric left ventricular hypertrophy.   8. Normal left atrial size.   9. Thickening of the anterior mitralvalve leaflet.  10. Mild tricuspid regurgitation.  11. Sclerotic aortic valve with normal opening.  12. Lambl excrescence noted.  13. Pulmonic valve regurgitation.    LV Ejection Fraction by Cabrera's Method with a biplane EF of 56 %.     [id number] José Miguel Osuna MD, Electronically signed on 11/22/2018 at 3:02:07 PM

## 2019-01-03 NOTE — CONSULT NOTE ADULT - CONSULT REQUESTED DATE/TIME
03-Jan-2019 11:45
01-Jan-2019 13:06
31-Dec-2018
31-Dec-2018 13:03
31-Dec-2018 17:30
31-Dec-2018 23:00

## 2019-01-03 NOTE — CONSULT NOTE ADULT - ASSESSMENT
Flu +  Likely viral pneumonia though bacterial superinfection is possible  Patient improved with drainage of effusion  Culture of effusion negative so far but given exudate is likely parapneumonic in nature but would check cytology again though prior cytology was negative  Hypoxia  Leukopenia  CRI likely contributing to effusion given prior transudate    Rec:    Diurese as able  Chest tube drainage as required  Consider thoracic surgery evaluation for chest tube management and possible bx if no improvement with therapy for likely viral pneumonia  Follow CXR and CT for improvement  Check cytology  Follow wbc and fevers  Consider abx if becomes febrile  Optimize cardiac function  ID f/u    D/w medicine  D/w ID

## 2019-01-04 LAB
ANION GAP SERPL CALC-SCNC: 13 MMOL/L — SIGNIFICANT CHANGE UP (ref 5–17)
BUN SERPL-MCNC: 35 MG/DL — HIGH (ref 8–20)
CALCIUM SERPL-MCNC: 8 MG/DL — LOW (ref 8.6–10.2)
CHLORIDE SERPL-SCNC: 92 MMOL/L — LOW (ref 98–107)
CO2 SERPL-SCNC: 25 MMOL/L — SIGNIFICANT CHANGE UP (ref 22–29)
CREAT SERPL-MCNC: 5.31 MG/DL — HIGH (ref 0.5–1.3)
GLUCOSE BLDC GLUCOMTR-MCNC: 137 MG/DL — HIGH (ref 70–99)
GLUCOSE BLDC GLUCOMTR-MCNC: 142 MG/DL — HIGH (ref 70–99)
GLUCOSE BLDC GLUCOMTR-MCNC: 150 MG/DL — HIGH (ref 70–99)
GLUCOSE BLDC GLUCOMTR-MCNC: 84 MG/DL — SIGNIFICANT CHANGE UP (ref 70–99)
GLUCOSE SERPL-MCNC: 72 MG/DL — SIGNIFICANT CHANGE UP (ref 70–115)
HCT VFR BLD CALC: 28.6 % — LOW (ref 37–47)
HGB BLD-MCNC: 8.6 G/DL — LOW (ref 12–16)
MCHC RBC-ENTMCNC: 27.7 PG — SIGNIFICANT CHANGE UP (ref 27–31)
MCHC RBC-ENTMCNC: 30.1 G/DL — LOW (ref 32–36)
MCV RBC AUTO: 92 FL — SIGNIFICANT CHANGE UP (ref 81–99)
NON-GYNECOLOGICAL CYTOLOGY STUDY: SIGNIFICANT CHANGE UP
PLATELET # BLD AUTO: 189 K/UL — SIGNIFICANT CHANGE UP (ref 150–400)
POTASSIUM SERPL-MCNC: 4.9 MMOL/L — SIGNIFICANT CHANGE UP (ref 3.5–5.3)
POTASSIUM SERPL-SCNC: 4.9 MMOL/L — SIGNIFICANT CHANGE UP (ref 3.5–5.3)
RBC # BLD: 3.11 M/UL — LOW (ref 4.4–5.2)
RBC # FLD: 14.9 % — SIGNIFICANT CHANGE UP (ref 11–15.6)
SODIUM SERPL-SCNC: 130 MMOL/L — LOW (ref 135–145)
WBC # BLD: 2.7 K/UL — LOW (ref 4.8–10.8)
WBC # FLD AUTO: 2.7 K/UL — LOW (ref 4.8–10.8)

## 2019-01-04 PROCEDURE — 71045 X-RAY EXAM CHEST 1 VIEW: CPT | Mod: 26

## 2019-01-04 PROCEDURE — 99232 SBSQ HOSP IP/OBS MODERATE 35: CPT

## 2019-01-04 PROCEDURE — 99231 SBSQ HOSP IP/OBS SF/LOW 25: CPT

## 2019-01-04 RX ORDER — ERYTHROPOIETIN 10000 [IU]/ML
10000 INJECTION, SOLUTION INTRAVENOUS; SUBCUTANEOUS
Qty: 0 | Refills: 0 | Status: DISCONTINUED | OUTPATIENT
Start: 2019-01-04 | End: 2019-01-07

## 2019-01-04 RX ADMIN — HEPARIN SODIUM 5000 UNIT(S): 5000 INJECTION INTRAVENOUS; SUBCUTANEOUS at 22:09

## 2019-01-04 RX ADMIN — SACUBITRIL AND VALSARTAN 1 TABLET(S): 24; 26 TABLET, FILM COATED ORAL at 16:32

## 2019-01-04 RX ADMIN — Medication 1 TABLET(S): at 06:06

## 2019-01-04 RX ADMIN — Medication 100 MILLIGRAM(S): at 06:07

## 2019-01-04 RX ADMIN — Medication 1 TABLET(S): at 16:32

## 2019-01-04 RX ADMIN — HEPARIN SODIUM 5000 UNIT(S): 5000 INJECTION INTRAVENOUS; SUBCUTANEOUS at 16:32

## 2019-01-04 RX ADMIN — Medication 30 MILLIGRAM(S): at 23:43

## 2019-01-04 RX ADMIN — CARVEDILOL PHOSPHATE 12.5 MILLIGRAM(S): 80 CAPSULE, EXTENDED RELEASE ORAL at 06:07

## 2019-01-04 RX ADMIN — INSULIN GLARGINE 10 UNIT(S): 100 INJECTION, SOLUTION SUBCUTANEOUS at 22:09

## 2019-01-04 RX ADMIN — CARVEDILOL PHOSPHATE 12.5 MILLIGRAM(S): 80 CAPSULE, EXTENDED RELEASE ORAL at 16:33

## 2019-01-04 RX ADMIN — HEPARIN SODIUM 5000 UNIT(S): 5000 INJECTION INTRAVENOUS; SUBCUTANEOUS at 06:08

## 2019-01-04 RX ADMIN — ERYTHROPOIETIN 10000 UNIT(S): 10000 INJECTION, SOLUTION INTRAVENOUS; SUBCUTANEOUS at 18:29

## 2019-01-04 RX ADMIN — Medication 81 MILLIGRAM(S): at 11:54

## 2019-01-04 RX ADMIN — SACUBITRIL AND VALSARTAN 1 TABLET(S): 24; 26 TABLET, FILM COATED ORAL at 06:07

## 2019-01-04 RX ADMIN — PANTOPRAZOLE SODIUM 40 MILLIGRAM(S): 20 TABLET, DELAYED RELEASE ORAL at 06:07

## 2019-01-04 RX ADMIN — CALCITRIOL 0.25 MICROGRAM(S): 0.5 CAPSULE ORAL at 11:54

## 2019-01-04 RX ADMIN — Medication 100 MILLIGRAM(S): at 16:33

## 2019-01-04 RX ADMIN — CHLORHEXIDINE GLUCONATE 1 APPLICATION(S): 213 SOLUTION TOPICAL at 06:24

## 2019-01-04 NOTE — PROGRESS NOTE ADULT - ATTENDING COMMENTS
Pt is seen and examined, slowly improving, cough and SOb improving, s/p chest tube, Ct surgery on board  f/u chest xray   c/w tamiflu  f/u pleural fluid c/s and cytology  f/u labs
will sign off

## 2019-01-04 NOTE — PROGRESS NOTE ADULT - PROBLEM SELECTOR PLAN 1
R pigtail to suction  no airleak  keep for now per BF  repeat CXR in Am  CTS will continue to follow

## 2019-01-05 LAB
CULTURE RESULTS: SIGNIFICANT CHANGE UP
GLUCOSE BLDC GLUCOMTR-MCNC: 117 MG/DL — HIGH (ref 70–99)
GLUCOSE BLDC GLUCOMTR-MCNC: 151 MG/DL — HIGH (ref 70–99)
GLUCOSE BLDC GLUCOMTR-MCNC: 62 MG/DL — LOW (ref 70–99)
GLUCOSE BLDC GLUCOMTR-MCNC: 92 MG/DL — SIGNIFICANT CHANGE UP (ref 70–99)
SPECIMEN SOURCE: SIGNIFICANT CHANGE UP

## 2019-01-05 PROCEDURE — 99232 SBSQ HOSP IP/OBS MODERATE 35: CPT

## 2019-01-05 PROCEDURE — 71045 X-RAY EXAM CHEST 1 VIEW: CPT | Mod: 26

## 2019-01-05 PROCEDURE — 71045 X-RAY EXAM CHEST 1 VIEW: CPT | Mod: 26,77

## 2019-01-05 RX ORDER — INSULIN GLARGINE 100 [IU]/ML
6 INJECTION, SOLUTION SUBCUTANEOUS AT BEDTIME
Qty: 0 | Refills: 0 | Status: DISCONTINUED | OUTPATIENT
Start: 2019-01-05 | End: 2019-01-06

## 2019-01-05 RX ADMIN — HEPARIN SODIUM 5000 UNIT(S): 5000 INJECTION INTRAVENOUS; SUBCUTANEOUS at 06:00

## 2019-01-05 RX ADMIN — Medication 81 MILLIGRAM(S): at 11:55

## 2019-01-05 RX ADMIN — SACUBITRIL AND VALSARTAN 1 TABLET(S): 24; 26 TABLET, FILM COATED ORAL at 06:01

## 2019-01-05 RX ADMIN — Medication 2: at 16:40

## 2019-01-05 RX ADMIN — HEPARIN SODIUM 5000 UNIT(S): 5000 INJECTION INTRAVENOUS; SUBCUTANEOUS at 15:18

## 2019-01-05 RX ADMIN — CHLORHEXIDINE GLUCONATE 1 APPLICATION(S): 213 SOLUTION TOPICAL at 06:02

## 2019-01-05 RX ADMIN — INSULIN GLARGINE 6 UNIT(S): 100 INJECTION, SOLUTION SUBCUTANEOUS at 22:12

## 2019-01-05 RX ADMIN — SACUBITRIL AND VALSARTAN 1 TABLET(S): 24; 26 TABLET, FILM COATED ORAL at 17:34

## 2019-01-05 RX ADMIN — PANTOPRAZOLE SODIUM 40 MILLIGRAM(S): 20 TABLET, DELAYED RELEASE ORAL at 06:01

## 2019-01-05 RX ADMIN — Medication 1 TABLET(S): at 17:34

## 2019-01-05 RX ADMIN — CARVEDILOL PHOSPHATE 12.5 MILLIGRAM(S): 80 CAPSULE, EXTENDED RELEASE ORAL at 06:01

## 2019-01-05 RX ADMIN — CARVEDILOL PHOSPHATE 12.5 MILLIGRAM(S): 80 CAPSULE, EXTENDED RELEASE ORAL at 17:34

## 2019-01-05 RX ADMIN — Medication 100 MILLIGRAM(S): at 06:00

## 2019-01-05 RX ADMIN — CALCITRIOL 0.25 MICROGRAM(S): 0.5 CAPSULE ORAL at 11:55

## 2019-01-05 RX ADMIN — HEPARIN SODIUM 5000 UNIT(S): 5000 INJECTION INTRAVENOUS; SUBCUTANEOUS at 22:11

## 2019-01-05 RX ADMIN — Medication 1 TABLET(S): at 07:59

## 2019-01-05 RX ADMIN — Medication 100 MILLIGRAM(S): at 17:34

## 2019-01-05 RX ADMIN — ERYTHROPOIETIN 10000 UNIT(S): 10000 INJECTION, SOLUTION INTRAVENOUS; SUBCUTANEOUS at 15:19

## 2019-01-06 LAB
ANION GAP SERPL CALC-SCNC: 14 MMOL/L — SIGNIFICANT CHANGE UP (ref 5–17)
BUN SERPL-MCNC: 26 MG/DL — HIGH (ref 8–20)
CALCIUM SERPL-MCNC: 8.7 MG/DL — SIGNIFICANT CHANGE UP (ref 8.6–10.2)
CHLORIDE SERPL-SCNC: 96 MMOL/L — LOW (ref 98–107)
CO2 SERPL-SCNC: 27 MMOL/L — SIGNIFICANT CHANGE UP (ref 22–29)
CREAT SERPL-MCNC: 4.29 MG/DL — HIGH (ref 0.5–1.3)
GLUCOSE BLDC GLUCOMTR-MCNC: 109 MG/DL — HIGH (ref 70–99)
GLUCOSE BLDC GLUCOMTR-MCNC: 151 MG/DL — HIGH (ref 70–99)
GLUCOSE BLDC GLUCOMTR-MCNC: 175 MG/DL — HIGH (ref 70–99)
GLUCOSE BLDC GLUCOMTR-MCNC: 92 MG/DL — SIGNIFICANT CHANGE UP (ref 70–99)
GLUCOSE SERPL-MCNC: 111 MG/DL — SIGNIFICANT CHANGE UP (ref 70–115)
HCT VFR BLD CALC: 32 % — LOW (ref 37–47)
HGB BLD-MCNC: 9.5 G/DL — LOW (ref 12–16)
MCHC RBC-ENTMCNC: 27.9 PG — SIGNIFICANT CHANGE UP (ref 27–31)
MCHC RBC-ENTMCNC: 29.7 G/DL — LOW (ref 32–36)
MCV RBC AUTO: 93.8 FL — SIGNIFICANT CHANGE UP (ref 81–99)
PLATELET # BLD AUTO: 221 K/UL — SIGNIFICANT CHANGE UP (ref 150–400)
POTASSIUM SERPL-MCNC: 4.8 MMOL/L — SIGNIFICANT CHANGE UP (ref 3.5–5.3)
POTASSIUM SERPL-SCNC: 4.8 MMOL/L — SIGNIFICANT CHANGE UP (ref 3.5–5.3)
RBC # BLD: 3.41 M/UL — LOW (ref 4.4–5.2)
RBC # FLD: 15.8 % — HIGH (ref 11–15.6)
SODIUM SERPL-SCNC: 137 MMOL/L — SIGNIFICANT CHANGE UP (ref 135–145)
WBC # BLD: 2.2 K/UL — LOW (ref 4.8–10.8)
WBC # FLD AUTO: 2.2 K/UL — LOW (ref 4.8–10.8)

## 2019-01-06 PROCEDURE — 99232 SBSQ HOSP IP/OBS MODERATE 35: CPT

## 2019-01-06 RX ORDER — INSULIN GLARGINE 100 [IU]/ML
4 INJECTION, SOLUTION SUBCUTANEOUS AT BEDTIME
Qty: 0 | Refills: 0 | Status: DISCONTINUED | OUTPATIENT
Start: 2019-01-06 | End: 2019-01-07

## 2019-01-06 RX ADMIN — HEPARIN SODIUM 5000 UNIT(S): 5000 INJECTION INTRAVENOUS; SUBCUTANEOUS at 05:23

## 2019-01-06 RX ADMIN — Medication 2: at 12:04

## 2019-01-06 RX ADMIN — PANTOPRAZOLE SODIUM 40 MILLIGRAM(S): 20 TABLET, DELAYED RELEASE ORAL at 05:24

## 2019-01-06 RX ADMIN — Medication 100 MILLIGRAM(S): at 17:14

## 2019-01-06 RX ADMIN — CARVEDILOL PHOSPHATE 12.5 MILLIGRAM(S): 80 CAPSULE, EXTENDED RELEASE ORAL at 05:23

## 2019-01-06 RX ADMIN — Medication 1 TABLET(S): at 05:24

## 2019-01-06 RX ADMIN — Medication 1 TABLET(S): at 17:15

## 2019-01-06 RX ADMIN — CALCITRIOL 0.25 MICROGRAM(S): 0.5 CAPSULE ORAL at 12:04

## 2019-01-06 RX ADMIN — Medication 81 MILLIGRAM(S): at 12:04

## 2019-01-06 RX ADMIN — HEPARIN SODIUM 5000 UNIT(S): 5000 INJECTION INTRAVENOUS; SUBCUTANEOUS at 13:22

## 2019-01-06 RX ADMIN — HEPARIN SODIUM 5000 UNIT(S): 5000 INJECTION INTRAVENOUS; SUBCUTANEOUS at 23:00

## 2019-01-06 RX ADMIN — CHLORHEXIDINE GLUCONATE 1 APPLICATION(S): 213 SOLUTION TOPICAL at 05:24

## 2019-01-06 RX ADMIN — INSULIN GLARGINE 4 UNIT(S): 100 INJECTION, SOLUTION SUBCUTANEOUS at 23:00

## 2019-01-06 RX ADMIN — SACUBITRIL AND VALSARTAN 1 TABLET(S): 24; 26 TABLET, FILM COATED ORAL at 05:23

## 2019-01-06 RX ADMIN — Medication 100 MILLIGRAM(S): at 05:24

## 2019-01-06 RX ADMIN — SACUBITRIL AND VALSARTAN 1 TABLET(S): 24; 26 TABLET, FILM COATED ORAL at 17:14

## 2019-01-06 RX ADMIN — CARVEDILOL PHOSPHATE 12.5 MILLIGRAM(S): 80 CAPSULE, EXTENDED RELEASE ORAL at 17:14

## 2019-01-07 ENCOUNTER — TRANSCRIPTION ENCOUNTER (OUTPATIENT)
Age: 59
End: 2019-01-07

## 2019-01-07 VITALS
RESPIRATION RATE: 17 BRPM | DIASTOLIC BLOOD PRESSURE: 79 MMHG | OXYGEN SATURATION: 98 % | SYSTOLIC BLOOD PRESSURE: 158 MMHG | TEMPERATURE: 97 F | HEART RATE: 78 BPM

## 2019-01-07 LAB
GLUCOSE BLDC GLUCOMTR-MCNC: 104 MG/DL — HIGH (ref 70–99)
GLUCOSE BLDC GLUCOMTR-MCNC: 105 MG/DL — HIGH (ref 70–99)
GLUCOSE BLDC GLUCOMTR-MCNC: 140 MG/DL — HIGH (ref 70–99)

## 2019-01-07 PROCEDURE — 82435 ASSAY OF BLOOD CHLORIDE: CPT

## 2019-01-07 PROCEDURE — 82040 ASSAY OF SERUM ALBUMIN: CPT

## 2019-01-07 PROCEDURE — 80053 COMPREHEN METABOLIC PANEL: CPT

## 2019-01-07 PROCEDURE — 83540 ASSAY OF IRON: CPT

## 2019-01-07 PROCEDURE — 71045 X-RAY EXAM CHEST 1 VIEW: CPT

## 2019-01-07 PROCEDURE — T1013: CPT

## 2019-01-07 PROCEDURE — 84436 ASSAY OF TOTAL THYROXINE: CPT

## 2019-01-07 PROCEDURE — 82803 BLOOD GASES ANY COMBINATION: CPT

## 2019-01-07 PROCEDURE — 89051 BODY FLUID CELL COUNT: CPT

## 2019-01-07 PROCEDURE — 36415 COLL VENOUS BLD VENIPUNCTURE: CPT

## 2019-01-07 PROCEDURE — 96365 THER/PROPH/DIAG IV INF INIT: CPT

## 2019-01-07 PROCEDURE — 83880 ASSAY OF NATRIURETIC PEPTIDE: CPT

## 2019-01-07 PROCEDURE — 87070 CULTURE OTHR SPECIMN AEROBIC: CPT

## 2019-01-07 PROCEDURE — 84157 ASSAY OF PROTEIN OTHER: CPT

## 2019-01-07 PROCEDURE — 83605 ASSAY OF LACTIC ACID: CPT

## 2019-01-07 PROCEDURE — 99232 SBSQ HOSP IP/OBS MODERATE 35: CPT

## 2019-01-07 PROCEDURE — 88112 CYTOPATH CELL ENHANCE TECH: CPT

## 2019-01-07 PROCEDURE — 82746 ASSAY OF FOLIC ACID SERUM: CPT

## 2019-01-07 PROCEDURE — 86901 BLOOD TYPING SEROLOGIC RH(D): CPT

## 2019-01-07 PROCEDURE — 84132 ASSAY OF SERUM POTASSIUM: CPT

## 2019-01-07 PROCEDURE — 85027 COMPLETE CBC AUTOMATED: CPT

## 2019-01-07 PROCEDURE — 99261: CPT

## 2019-01-07 PROCEDURE — 88305 TISSUE EXAM BY PATHOLOGIST: CPT

## 2019-01-07 PROCEDURE — 82607 VITAMIN B-12: CPT

## 2019-01-07 PROCEDURE — 83735 ASSAY OF MAGNESIUM: CPT

## 2019-01-07 PROCEDURE — 84484 ASSAY OF TROPONIN QUANT: CPT

## 2019-01-07 PROCEDURE — 83615 LACTATE (LD) (LDH) ENZYME: CPT

## 2019-01-07 PROCEDURE — 93005 ELECTROCARDIOGRAM TRACING: CPT

## 2019-01-07 PROCEDURE — 71250 CT THORAX DX C-: CPT

## 2019-01-07 PROCEDURE — 86850 RBC ANTIBODY SCREEN: CPT

## 2019-01-07 PROCEDURE — 87798 DETECT AGENT NOS DNA AMP: CPT

## 2019-01-07 PROCEDURE — 99285 EMERGENCY DEPT VISIT HI MDM: CPT | Mod: 25

## 2019-01-07 PROCEDURE — 84443 ASSAY THYROID STIM HORMONE: CPT

## 2019-01-07 PROCEDURE — 84466 ASSAY OF TRANSFERRIN: CPT

## 2019-01-07 PROCEDURE — 74176 CT ABD & PELVIS W/O CONTRAST: CPT

## 2019-01-07 PROCEDURE — 83036 HEMOGLOBIN GLYCOSYLATED A1C: CPT

## 2019-01-07 PROCEDURE — 82945 GLUCOSE OTHER FLUID: CPT

## 2019-01-07 PROCEDURE — 82042 OTHER SOURCE ALBUMIN QUAN EA: CPT

## 2019-01-07 PROCEDURE — 84439 ASSAY OF FREE THYROXINE: CPT

## 2019-01-07 PROCEDURE — 87040 BLOOD CULTURE FOR BACTERIA: CPT

## 2019-01-07 PROCEDURE — 84295 ASSAY OF SERUM SODIUM: CPT

## 2019-01-07 PROCEDURE — 83550 IRON BINDING TEST: CPT

## 2019-01-07 PROCEDURE — 87075 CULTR BACTERIA EXCEPT BLOOD: CPT

## 2019-01-07 PROCEDURE — 85014 HEMATOCRIT: CPT

## 2019-01-07 PROCEDURE — 80048 BASIC METABOLIC PNL TOTAL CA: CPT

## 2019-01-07 PROCEDURE — 84155 ASSAY OF PROTEIN SERUM: CPT

## 2019-01-07 PROCEDURE — 82947 ASSAY GLUCOSE BLOOD QUANT: CPT

## 2019-01-07 PROCEDURE — 82728 ASSAY OF FERRITIN: CPT

## 2019-01-07 PROCEDURE — 87486 CHLMYD PNEUM DNA AMP PROBE: CPT

## 2019-01-07 PROCEDURE — 87633 RESP VIRUS 12-25 TARGETS: CPT

## 2019-01-07 PROCEDURE — 96375 TX/PRO/DX INJ NEW DRUG ADDON: CPT

## 2019-01-07 PROCEDURE — 83986 ASSAY PH BODY FLUID NOS: CPT

## 2019-01-07 PROCEDURE — 82962 GLUCOSE BLOOD TEST: CPT

## 2019-01-07 PROCEDURE — 86900 BLOOD TYPING SEROLOGIC ABO: CPT

## 2019-01-07 PROCEDURE — 82330 ASSAY OF CALCIUM: CPT

## 2019-01-07 PROCEDURE — 87205 SMEAR GRAM STAIN: CPT

## 2019-01-07 PROCEDURE — 85730 THROMBOPLASTIN TIME PARTIAL: CPT

## 2019-01-07 PROCEDURE — 85610 PROTHROMBIN TIME: CPT

## 2019-01-07 PROCEDURE — 87581 M.PNEUMON DNA AMP PROBE: CPT

## 2019-01-07 RX ORDER — INSULIN GLARGINE 100 [IU]/ML
4 INJECTION, SOLUTION SUBCUTANEOUS
Qty: 0 | Refills: 0 | DISCHARGE
Start: 2019-01-07

## 2019-01-07 RX ADMIN — SACUBITRIL AND VALSARTAN 1 TABLET(S): 24; 26 TABLET, FILM COATED ORAL at 18:17

## 2019-01-07 RX ADMIN — CARVEDILOL PHOSPHATE 12.5 MILLIGRAM(S): 80 CAPSULE, EXTENDED RELEASE ORAL at 05:23

## 2019-01-07 RX ADMIN — HEPARIN SODIUM 5000 UNIT(S): 5000 INJECTION INTRAVENOUS; SUBCUTANEOUS at 13:10

## 2019-01-07 RX ADMIN — CARVEDILOL PHOSPHATE 12.5 MILLIGRAM(S): 80 CAPSULE, EXTENDED RELEASE ORAL at 18:17

## 2019-01-07 RX ADMIN — PANTOPRAZOLE SODIUM 40 MILLIGRAM(S): 20 TABLET, DELAYED RELEASE ORAL at 05:25

## 2019-01-07 RX ADMIN — SACUBITRIL AND VALSARTAN 1 TABLET(S): 24; 26 TABLET, FILM COATED ORAL at 05:22

## 2019-01-07 RX ADMIN — Medication 81 MILLIGRAM(S): at 12:51

## 2019-01-07 RX ADMIN — Medication 1 TABLET(S): at 05:23

## 2019-01-07 RX ADMIN — CHLORHEXIDINE GLUCONATE 1 APPLICATION(S): 213 SOLUTION TOPICAL at 05:24

## 2019-01-07 RX ADMIN — Medication 100 MILLIGRAM(S): at 05:23

## 2019-01-07 RX ADMIN — Medication 100 MILLIGRAM(S): at 18:17

## 2019-01-07 RX ADMIN — HEPARIN SODIUM 5000 UNIT(S): 5000 INJECTION INTRAVENOUS; SUBCUTANEOUS at 05:22

## 2019-01-07 RX ADMIN — CALCITRIOL 0.25 MICROGRAM(S): 0.5 CAPSULE ORAL at 12:51

## 2019-01-07 RX ADMIN — ERYTHROPOIETIN 10000 UNIT(S): 10000 INJECTION, SOLUTION INTRAVENOUS; SUBCUTANEOUS at 16:17

## 2019-01-07 RX ADMIN — Medication 1 TABLET(S): at 18:17

## 2019-01-07 NOTE — DISCHARGE NOTE ADULT - MEDICATION SUMMARY - MEDICATIONS TO TAKE
I will START or STAY ON the medications listed below when I get home from the hospital:    aspirin 81 mg oral delayed release tablet  -- 1 tab(s) by mouth once a day  -- Indication: For Oupatient medication    sacubitril-valsartan 97 mg-103 mg oral tablet  -- 1 tab(s) by mouth 2 times a day  -- Indication: For Acute on chronic clinical systolic heart failure    calcium carbonate 1250 mg (500 mg elemental calcium) oral tablet  -- 1 tab(s) by mouth 3 times a day  -- Indication: For Supplement    insulin glargine  -- 4 unit(s) subcutaneous once a day (at bedtime)  -- Indication: For Diabetes    Coreg 12.5 mg oral tablet  -- 1 tab(s) by mouth 2 times a day   -- It is very important that you take or use this exactly as directed.  Do not skip doses or discontinue unless directed by your doctor.  May cause drowsiness.  Alcohol may intensify this effect.  Use care when operating dangerous machinery.  Some non-prescription drugs may aggravate your condition.  Read all labels carefully.  If a warning appears, check with your doctor before taking.  Take with food or milk.    -- Indication: For Hypertension    FeroSul 325 mg (65 mg elemental iron) oral tablet  -- 1 tab(s) by mouth once a day   -- Indication: For Supplement    Colace 100 mg oral capsule  -- 1 cap(s) by mouth 2 times a day, As Needed -for constipation   -- Indication: For Laxative    senna oral tablet  -- 2 tab(s) by mouth once a day (at bedtime), As needed, Constipation  -- Indication: For Laxative    pantoprazole 40 mg oral delayed release tablet  -- 1 tab(s) by mouth once a day (before a meal)  -- Indication: For Gastritis    calcitriol 0.25 mcg oral capsule  -- 1 cap(s) by mouth once a day  -- Indication: For Supplement

## 2019-01-07 NOTE — DISCHARGE NOTE ADULT - HOSPITAL COURSE
Patient: JAROCHO MORALES [id number]                 Internal Medicine Hospitalist Discharge Note    Initial HPI:  57 y/o Icelandic speaking female, with PMH of ESRD, DM, PAF, NICM / CHF with recent thoracentesis, presented with SOB, found to have right pleural effusion and +Flu. Now s/p chest tube placement 12/31, followed by CT Surgery.  Chest tube was removed 1/5.    Interval History:  Pt was seen with  via phone, and SW at bedside.  Denies complaints.  no SOB.  No chest pain / palpitations. Reports getting OOB without difficulty.  No additional complaints.  Awaiting HD.  Overall feels at baseline.     >Recurrent pleural effusion - Attributed to fluid overload due to CHF / ESRD on HD.  S/p chest tube, now removed by CT surgery.  No s/s of active infection.   >Influenza A infection - s/p Tamiflu.  supportive care.  >ESRD on HD (MWF schedule) - Epogen with HD per renal  > CAD - continue ASA,  BBlocker, Statin. Evaluated by Cardiology.  > NICM - Repeat TTE showed EF 50-55%.  Monitor I&Os.  >DM- c/w Lantus and sliding scale.  FS stable.    >Chronic anemia due to ESRD - continue Fe supplementation, Procrit.     Disposition: stable for discharge.  Outpatient followup as above.  Patient was advised to return if they experience any recurrence or worsening of symptoms.  Total time spent on discharge was 45 minutes.  -Ian Chen D.O., Hospitalist, [facility]

## 2019-01-07 NOTE — DISCHARGE NOTE ADULT - PLAN OF CARE
stable for discharge It is important to see your primary physician as well as the physicians noted below within the next week to perform a comprehensive medical review.  Call their offices for an appointment as soon as you leave the hospital.  If you do not have a primary physician, contact the [facility] [phone] located on [address] [place] [address].  Your medical issues appear to be stable at this time, but if your symptoms recur or worsen, contact your physicians and/or return to the hospital if necessary.  If you encounter any issues or questions with your medication, call your physicians before stopping the medication.  Do not drive.  Limit your diet to 2 grams of sodium daily.  Diabetic Diet.

## 2019-01-07 NOTE — DISCHARGE NOTE ADULT - MEDICATION SUMMARY - MEDICATIONS TO CHANGE
I will SWITCH the dose or number of times a day I take the medications listed below when I get home from the hospital:    Lantus 100 units/mL subcutaneous solution  -- 10 unit(s) subcutaneous once a day (at bedtime)

## 2019-01-07 NOTE — PROGRESS NOTE ADULT - ASSESSMENT
>Recurrent pleural effusion - Attributed to fluid overload due to CHF / ESRD on HD.  CT surgery f/u noted.  Continue Chest tube to suction.  Analysis mildly exudative, however, ID felt no active infection and pt monitored off IV Abx.  Continued chest tube per CT surgery  >Influenza A infection - continue Tamiflu.  supportive care.  >ESRD on HD (MW schedule) - Epogen with HD per renal  > CAD - continue ASA,  BBlocker, Statin. Evaluated by Cardiology.  > NICM - Repeat TTE showed EF 50-55%.  Monitor I&Os.  >DM- c/w Lantus and sliding scale.  FS was low normal this am.  Will adjust Lantus.  >Chronic anemia due to ESRD - continue Fe supplementation, Procrit.   >DVT PPX: SCD
This  58  y.o. woman with ESRD (on HD MWF), NICM w HFrEF, chronic right pleural effusion   here with SOB     found with fevers  Influenza positive on testing  - continue 5 day treatment with Tamiflu after HD, on HD days.     right chest effusion s/p chest tube  - follow up cultures    bilateral lung infiltrates,  clinically favors pulmonary edema and not multifocal pneumonia in view of her hx, and elevated BNP  - now off all antibiotics  - monitor clinically.     will follow
57 y/o female with ESRD, prior cardiomyopathy with improved EF now ( 55%) , DM, hyperlipidemia, non obstructive CAD , prior history of pleural effusion s/p thoracocentesis presenting with SOB,  N/V , , productive cough x 1 week   elevated TNI 0.55 , but has been always at this level in setting of ESRD   Non obstructive CAD by cath in 3/2018     + flu positive , treat as per hospitalist team   Right sided pleural effusion s/p thoracocentesis exudative effusion ?parapneumonic, pulmonary following   continue dialysis for volume overload , continue dialysis   ID following     continue current medical therapy with carvedilol and entresto if not hypotensive   Follow up with Dr. Salazar as outpatient   will sign off, Reconsult if needed
58F sp R pigtail placement for pleural effusion.  Today, patient states that she feels better. Chest tube without air leak and with minimal drainage. Now removed. Will sign off, please reconsult thoracic surgery if needed.
58F sp R pigtail placement.  PT stable NAD
>Recurrent pleural effusion - Attributed to fluid overload due to CHF / ESRD on HD.  S/p chest tube, now removed by CT surgery.  No s/s of active infection.   >Influenza A infection - s/p Tamiflu.  supportive care.  >ESRD on HD (MWF schedule) - Epogen with HD per renal  > CAD - continue ASA,  BBlocker, Statin. Evaluated by Cardiology.  > NICM - Repeat TTE showed EF 50-55%.  Monitor I&Os.  >DM- c/w Lantus and sliding scale.  FS stable.    >Chronic anemia due to ESRD - continue Fe supplementation, Procrit.   >DVT PPX: SCD    Plan d/c post HD.  SW to confirm home aid arrangements.
>Recurrent pleural effusion - Attributed to fluid overload due to CHF / ESRD on HD.  S/p chest tube, now removed by CT surgery.  No s/s of active infection.   >Influenza A infection - s/p Tamiflu.  supportive care.  >ESRD on HD (MWF schedule) - Epogen with HD per renal  > CAD - continue ASA,  BBlocker, Statin. Evaluated by Cardiology.  > NICM - Repeat TTE showed EF 50-55%.  Monitor I&Os.  >DM- c/w Lantus and sliding scale.  Will further decrease Lantus  >Chronic anemia due to ESRD - continue Fe supplementation, Procrit.   >DVT PPX: SCD    Plan d/c in am post HD.
ESRD: + R pleural effusion ---> S/P prior drainage , + CM ----> Feels better   Next HD Tomorrow   S/P repeat drainage   Thoracic surgery follow up          Anemia: +CRF  - Hgb better   - Epogen with HD       RO: Was on CaCO3 and rocaltrol , resumed ===> follow labs       Recent outpt + Hep C Ab -----> Recent PCR here was negative     Influ A , RSV .   Renal dose Tamiflu
ESRD: + R pleural effusion ---> S/P prior drainage , + CM ----> Feels better   Next HD tomorrow  Will  need repeat drainage   Thoracic surgery to see      Anemia: +CKD  - Hgb better   - Epogen with HD     RO: Was on CaCO3 and rocaltrol , follow     Recent outpt + Hep C Ab -----> Recent PCR here was negative     Influ A , RSV .   Renal dose Tamiflu  CT Chest to be done
ESRD: HD kiara on MWF schedule  + R pleural effusion ---> S/P prior drainage , + CM ----> Feels better   Thoracic surgery follow up noted    Anemia: +CKD  - Hgb better   - Epogen with HD     RO: Was on CaCO3 and rocaltrol , resumed ===> follow labs     Recent outpt + Hep C Ab -----> Recent PCR here was negative     Influ A , RSV .   Renal dose Tamiflu
ESRD: HD today on MWF schedule  + R pleural effusion ---> S/P prior drainage , + CM ----> Feels better   Thoracic surgery follow up noted    Anemia: +CKD  - Hgb better   - Epogen with HD     RO: Was on CaCO3 and rocaltrol , resumed ===> follow labs     Recent outpt + Hep C Ab -----> Recent PCR here was negative     Will follow
ESRD: HD today will cont on MWF schedule  + R pleural effusion ---> S/P prior drainage , + CM ----> Feels better   Thoracic surgery follow up noted    Anemia: +CKD  - Hgb better   - Epogen with HD     RO: Was on CaCO3 and rocaltrol , resumed ===> follow labs     Recent outpt + Hep C Ab -----> Recent PCR here was negative     Influ A , RSV .   Renal dose Tamiflu
ESRD: HD yest will cont on MWF schedule  + R pleural effusion ---> S/P prior drainage , + CM ----> Feels better   Thoracic surgery follow up noted    Anemia: +CKD  - Hgb better   - Epogen with HD     RO: Was on CaCO3 and rocaltrol , resumed ===> follow labs     Recent outpt + Hep C Ab -----> Recent PCR here was negative     Influ A , RSV .   Renal dose Tamiflu
Pt is 57 y/o Female with PMHx of ESRD (on HD MWF), NICM w HFrEF, Dm2 on insulin, pafib no a/c due to GI bleed, pericardial effusion, recurrent right pleural effusion presents for shortness of breath, worsening for 3 days. associated with cough productive of clear sputum. On arrival she was noted to have fever 102, RVP positive for flu, chest Xray showed Increased right pleural effusion and airspace disease in right mid and lower lung field. Pt was seen by CT surgery, s/p chest tube 12/31/18, CT chest showed Small residual partially loculated right pleural effusion. Extensive bilateral consolidations and ground glass opacities most likely due to pulmonary edema. Pneumonia is also in the differential diagnosis. ID consult appreciated - likely congestion, elevated BNP, no need of abx, monitor off medication. Patient improved with drainage of effusion.    Plan:  >Recurrent pleural effusion - chf/esrd on hd  Ct surgery consult appreciated - s/p chest tube 12/31/18   fluid analysis positive for exudate - mildly  cytology negative in past. cultures without growth for 3 days, f/u pending cytology   CT chest result noted   ID consult appreciated - likely congestion, elevated BNP, no need of abx, monitor off medication  Chest tube management per CT surgery  Right pigtail catheter maintained for high chest tube output.   Pulmonary consult appreciated   Afebrile, neutropenia VSS. Continue to monitor  Daily CXR    >Flu A+  c/w renal dose Tamiflu. Day 2 of 5. 3 days left  c/w isolation  Pulm consult appreciated   CT chest result noted - congestion vs infiltrate   ID consult appreciated - likely congestion, elevated BNP, no need of abx, monitor off medication  Blood cultures negative for 48 hours    >ESRD on HD (MWF schedule)  renal consult appreciated   Epogen with HD per renal    >CAd - troponin 0.55  appreciate cardiology input   c/w home medication  had recent TTE - EF 50-55% - EF improved    >DM- c/w Lantus and sliding scale   continue to monitor BS     >Chronic anemia due to ESRD   ferritin 716, % saturation 8  epogen and venofer per renal    >DVT PPX: SCD
This  58  y.o. woman with ESRD (on HD MWF), NICM w HFrEF, chronic right pleural effusion   here with SOB     found with fevers  Influenza positive on testing  - continue 5 day treatment COURSE with Tamiflu after HD, on HD days. - ends this week    right chest effusion s/p chest tube  - cultures remain negative.     bilateral lung infiltrates,  clinically favors pulmonary edema and not multifocal pneumonia in view of her hx, and elevated BNP  - stable off all antibiotics
This is 57 Y/O woman with  PMHx of ESRD (on HD MWF), NICM w HFrEF, Dm2 on insulin, pafib no a/c due to GI bleed, pericardial effusion, recurrent right pleural effusion presents for shortness of breath, worsening for 3 days. associated with cough productive of clear sputum. On arrival she was noted to have fever 102, RVP positive for flu, chest xray showed Increased right pleural effusion and airspace disease in  right mid and lower lung field. Pt was seen by CT surgery, s/p chest tube, CT chest showed Small residual partially loculated right pleural effusion. Extensive bilateral consolidations and ground glass opacities most likely  due to pulmonary edema. Pneumonia is also in the differential diagnosis. ID consult appreciated - likely congestion, elevated BNP, no need of abx, monitor off medication    A/P    >Recurrent pleural effusion - chf/esrd on hd  Ct surgery consult appreciated - s/p chest tube - f/u c/s  fluid analysis positive for exudate  chest tube management per CT surgery  CT chest result noted - congestion vs infiltrate -   ID consult appreciated - likely congestion, elevated BNP, no need of abx, monitor off medication  getting hd today    >Flu  c/w Tamiflu   c/w isolation  CT chest result noted - congestion vs infiltrate   ID consult appreciated - likely congestion, elevated BNP, no need of abx, monitor off medication  discussed with son - updated, also recommend to start flu prophylaxis    >ESRD on HD - renal consulted - M-W-F  last HD yesterday     >CAd - troponin 0.55  appreciate cardiology input   c/w home medication  had recent TTE - EF 50-55%    >DM- c/w Lantus and sliding scale     >Chronic anemia due to ESRD   f/u cbc, iron panel    >DVt PPX _ SCD
This is 57 Y/O woman with  PMHx of ESRD (on HD MWF), NICM w HFrEF, Dm2 on insulin, pafib no a/c due to GI bleed, pericardial effusion, recurrent right pleural effusion presents for shortness of breath, worsening for 3 days. associated with cough productive of clear sputum. On arrival she was noted to have fever 102, RVP positive for flu, chest xray showed Increased right pleural effusion and airspace disease in  right mid and lower lung field. Pt was seen by CT surgery, s/p chest tube, CT chest showed Small residual partially loculated right pleural effusion. Extensive bilateral consolidations and ground glass opacities most likely  due to pulmonary edema. Pneumonia is also in the differential diagnosis. ID consult appreciated - likely congestion, elevated BNP, no need of abx, monitor off medication    A/P    >Recurrent pleural effusion - chf/esrd on hd  Ct surgery consult appreciated - s/p chest tube - f/u c/s, cytology  fluid analysis positive for exudate - mildly  cytology negative in past   chest tube management per CT surgery  CT chest result noted   ID consult appreciated - likely congestion, elevated BNP, no need of abx, monitor off medication  Pulmonary consult requested for recurrent pleural effusion     >Flu  c/w Tamiflu   c/w isolation  CT chest result noted - congestion vs infiltrate   ID consult appreciated - likely congestion, elevated BNP, no need of abx, monitor off medication  discussed with son - updated, also recommend to start flu prophylaxis    >ESRD on HD - renal consult appreciated - HD per renal    >CAd - troponin 0.55  appreciate cardiology input   c/w home medication  had recent TTE - EF 50-55% - EF improved    >DM- c/w Lantus and sliding scale     >Chronic anemia due to ESRD   ferritin 716, % saturation 8  epo and venofer per renal    >DVt PPX _ SCD
This is 59 Y/O woman with  PMHx of ESRD (on HD MWF), NICM w HFrEF, Dm2 on insulin, pafib no a/c due to GI bleed, pericardial effusion, chronic right pleural effusion presents for shortness of breath, worsening for 3 days. associated with cough productive of clear sputum. On arrival she was noted to have fever 102, RVP positive for flu, chest xray showed Increased right pleural effusion and airspace disease in  right mid and lower lung field. Pt report feeling warm at home, no documented fever associated with chills. Pt report chest discomfort associated with cough.     A/P    >SOB - multifactorial - pleural effusion and flu   Ct surgery consult appreciated - s/p chest tube - f/u c/s  Right thoracentesis with ultrasound guidance by radiologist  11/23/2018  c/w Tamiflu   CT chest result noted - congestion vs infiltrate - ID consult requested - defer abx per ID    >Fever - likely due to flu,   CT chest result noted - congestion vs infiltrate - ID consult requested - defer abx per ID  f/u blood c/s    >ESRD on HD - renal consulted - M-W-F  last HD yesterday     >CAd - troponin 0.55  appreciate cardiology input   c/w home medication  had recent TTE - EF 50-55%    >DM- c/w Lantus and sliding scale     >Chronic anemia due to ESRD   f/u cbc, iron panel    >DVt PPX _ SCD
influenza responding to rx  Effusion neg for malignancy and resolved with chest tube  Resp status stable    Plan:  complete rx per ID  T surg to remove   Little else to add  recall Prn  will need f/u CCT in 6-8 wks
· Assessment		  ESRD: + R pleural effusion ---> S/P prior drainage , + CM ----> Feels better   Next HD Today   S/P repeat drainage   Thoracic surgery follow up          Anemia: +CRF  - Hgb better   - Epogen with HD       RO: Was on CaCO3 and rocaltrol , resume       Recent outpt + Hep C Ab -----> Recent PCR here was negative     Influ A , RSV .   Renal dose Tamiflu  CT Chest to be done

## 2019-01-07 NOTE — PROGRESS NOTE ADULT - SUBJECTIVE AND OBJECTIVE BOX
[facility]  INFECTIOUS DISEASES AND INTERNAL MEDICINE at [place]  =======================================================  Jose Carlos Servin MD  Diplomates American Board of Internal Medicine and Infectious Diseases  =======================================================    [id number]  JAROCHO WAGNERA   follow up for: influenza  patient seen and examined.     seen at HD unit.   feels better   less cough  no more fevers here.       ===================================================  REVIEW OF SYSTEMS:  as above  all other ROS negative    =======================================================  Allergies    No Known Allergies    Antibiotics:  oseltamivir 30 milliGRAM(s) Oral <User Schedule>    Other medications:  aspirin enteric coated 81 milliGRAM(s) Oral daily  carvedilol 12.5 milliGRAM(s) Oral every 12 hours  dextrose 5%. 1000 milliLiter(s) IV Continuous <Continuous>  dextrose 50% Injectable 12.5 Gram(s) IV Push once  dextrose 50% Injectable 25 Gram(s) IV Push once  dextrose 50% Injectable 25 Gram(s) IV Push once  docusate sodium 100 milliGRAM(s) Oral two times a day  epoetin gian Injectable 53021 Unit(s) IV Push once  insulin glargine Injectable (LANTUS) 10 Unit(s) SubCutaneous at bedtime  insulin lispro (HumaLOG) corrective regimen sliding scale   SubCutaneous three times a day before meals  insulin lispro (HumaLOG) corrective regimen sliding scale   SubCutaneous at bedtime  pantoprazole    Tablet 40 milliGRAM(s) Oral before breakfast  sacubitril 97 mG/valsartan 103 mG 1 Tablet(s) Oral two times a day        ======================================================  Physical Exam:  ============  T(F): 98.5 (02 Jan 2019 08:05), Max: 102.8 (01 Jan 2019 03:16)  HR: 68 (02 Jan 2019 08:05)  BP: 131/76 (02 Jan 2019 08:05)  RR: 18 (02 Jan 2019 08:05)  SpO2: 94% (02 Jan 2019 08:05) (90% - 100%)    General:  No acute distress. older than stated age  Eye: Pupils are equal, round and reactive to light, Extraocular movements are intact, Normal conjunctiva.  HENT: Normocephalic, Oral mucosa is moist, No pharyngeal erythema, No sinus tenderness.  Neck: Supple, No lymphadenopathy.  Respiratory: Lungs with diminished air entry at bases  RIGHT chest tube with clear liquid output. UNCHANGED  Cardiovascular: Normal rate, Regular rhythm, No murmur, Good pulses equal in all extremities, trace edema.  Gastrointestinal: Soft, Non-tender, Non-distended, Normal bowel sounds.  Genitourinary: No costovertebral angle tenderness.  Lymphatics: No lymphadenopathy neck,   Musculoskeletal: Normal range of motion, Normal strength.  Integumentary: No rash.  Neurologic: Alert, Oriented, No focal deficits, Cranial Nerves II-XII are grossly intact.  Psychiatric: Appropriate mood & affect.      =======================================================  Labs:                        8.5    2.8   )-----------( 203      ( 02 Jan 2019 06:56 )             27.8     01-02    134<L>  |  92<L>  |  54.0<H>  ----------------------------<  108  4.7   |  27.0  |  5.82<H>    Ca    7.8<L>      02 Jan 2019 06:56  Mg     2.3     01-02    TPro  6.0<L>  /  Alb  3.0<L>  /  TBili  x   /  DBili  x   /  AST  x   /  ALT  x   /  AlkPhos  x   01-01        Culture - Body Fluid with Gram Stain (collected 12-31-18 @ 18:03)  Source: .Body Fluid  Gram Stain (12-31-18 @ 19:32):    Few White blood cells    No organisms seen    Rapid Respiratory Viral Panel (12.31.18 @ 11:35)    Rapid RVP Result: Detected: This Respiratory Panel uses polymerase chain reaction (PCR) to detect for  adenovirus; coronavirus (HKU1, NL63, 229E, OC43); human metapneumovirus  (hMPV); human enterovirus/rhinovirus (Entero/RV); influenza A; influenza  A/H1; influenza A/H3; influenza A/H1-2009; influenza B; parainfluenza  viruses 1, 2, 3, 4; respiratory syncytial virus; Mycoplasma pneumoniae;  and Chlamydophila pneumoniae.    Influenza A (RapRVP): Detected    Influenza AH1 2009 (RapRVP): Detected
CARDIOLOGY PROGRESS NOTE   ([facility])                             Reason for follow up: Non ischemic CM, elevated TNI,                             Overnight: s/p drainage of right sided pleural effusion using pigtail    Subjective: feels better,   Review of symptoms: Cardiac:  No chest pain. No dyspnea. No palpitations.  Respiratory:no cough.1  Gastrointestinal: No diarrhea. No abdominal pain. No bleeding.     Past medical history: No updates.     	  Vital Signs Last 24 Hrs  T(C): 36.8 (03 Jan 2019 12:36), Max: 37.1 (02 Jan 2019 23:23)  T(F): 98.2 (03 Jan 2019 12:36), Max: 98.7 (02 Jan 2019 23:23)  HR: 73 (03 Jan 2019 12:36) (70 - 76)  BP: 139/79 (03 Jan 2019 12:36) (121/73 - 139/79)  BP(mean): --  RR: 20 (03 Jan 2019 12:36) (18 - 20)  SpO2: 98% (03 Jan 2019 12:36) (92% - 98%)    Physical exam      Constitutional: lethargic, falls asleep during interview,   HEENT: Atraumatic and normcephalic , neck is supple . no JVD. No carotid bruit. PEERL   CNS: A&Ox3. No focal deficits. EOMI. Cranial nerves II-IX are intact.   Lymph Nodes: Cervical : Not palpable.  Respiratory: decreased Breath sounds RLL  Cardiovascular: S1S2 RRR. No murmur/rubs or gallop.  Gastrointestinal: Soft non-tender and non distended . hypoactive Bowel sounds. negative Engel's sign.  Extremities: trace B/L LE edema   Psychiatric: lethargic  Skin: Hot to touch, No skin rash/ulcers visualized to face, hands or feet.        LABS:                        8.9    2.6   )-----------( 208      ( 03 Jan 2019 09:26 )             30.0     01-03    135  |  95<L>  |  29.0<H>  ----------------------------<  167<H>  4.5   |  29.0  |  4.10<H>    Ca    7.9<L>      03 Jan 2019 09:26  Mg     2.3     01-02    TPro  6.0<L>  /  Alb  3.0<L>  /  TBili  x   /  DBili  x   /  AST  x   /  ALT  x   /  AlkPhos  x   01-01      MEDICATIONS  (STANDING):  aspirin enteric coated 81 milliGRAM(s) Oral daily  calcitriol   Capsule 0.25 MICROGram(s) Oral daily  calcium carbonate   1250 mG (OsCal) 1 Tablet(s) Oral two times a day  carvedilol 12.5 milliGRAM(s) Oral every 12 hours  chlorhexidine 2% Cloths 1 Application(s) Topical <User Schedule>  dextrose 5%. 1000 milliLiter(s) (50 mL/Hr) IV Continuous <Continuous>  dextrose 50% Injectable 12.5 Gram(s) IV Push once  dextrose 50% Injectable 25 Gram(s) IV Push once  dextrose 50% Injectable 25 Gram(s) IV Push once  docusate sodium 100 milliGRAM(s) Oral two times a day  heparin  Injectable 5000 Unit(s) SubCutaneous every 8 hours  insulin glargine Injectable (LANTUS) 10 Unit(s) SubCutaneous at bedtime  insulin lispro (HumaLOG) corrective regimen sliding scale   SubCutaneous three times a day before meals  insulin lispro (HumaLOG) corrective regimen sliding scale   SubCutaneous at bedtime  oseltamivir 30 milliGRAM(s) Oral <User Schedule>  pantoprazole    Tablet 40 milliGRAM(s) Oral before breakfast  sacubitril 97 mG/valsartan 103 mG 1 Tablet(s) Oral two times a day    MEDICATIONS  (PRN):  dextrose 40% Gel 15 Gram(s) Oral once PRN Blood Glucose LESS THAN 70 milliGRAM(s)/deciliter  glucagon  Injectable 1 milliGRAM(s) IntraMuscular once PRN Glucose LESS THAN 70 milligrams/deciliter  senna 2 Tablet(s) Oral at bedtime PRN Constipation    CXR , right sided pleural effusion better, Right hilar infiltrate vs congestion
CC: SOB  INTERVAL HPI/OVERNIGHT EVENTS: 57 y/o Cymraes speaking female, with PMH of ESRD, DM, PAF, CHF with recent thoracentesis, presented with SOB, found to have right pleural effusion and +Flu. Now s/p PTC 12/31/8. Pt seen and examined at bedside by Attending and PA with  [facility]. Pt states her breathing has improved. Denies fever, or chills, shortness of breath, difficulty breathing, chest pain, palpitations, abdominal pain, nausea or vomiting.    Allergies  No Known Allergies    HEALTH ISSUES - PROBLEM Dx:  Acute pulmonary edema: Acute pulmonary edema  End stage renal disease on dialysis: End stage renal disease on dialysis  Acute on chronic clinical systolic heart failure: Acute on chronic clinical systolic heart failure  Influenza A: Influenza A  Pleural effusion: Pleural effusion    PAST MEDICAL & SURGICAL HISTORY:  Coronary artery disease, angina presence unspecified, unspecified vessel or lesion type, unspecified whether native or transplanted heart  Paroxysmal atrial fibrillation  Anemia due to chronic kidney disease, unspecified CKD stage  Chronic systolic congestive heart failure  Pleural effusion  Pericardial effusion  End stage renal disease on dialysis  HLD (hyperlipidemia)  HTN (hypertension)  DM (diabetes mellitus)  A-V fistula  Encounter for dialysis catheter care    Vital Signs Last 24 Hrs  T(C): 36.7 (03 Jan 2019 23:24), Max: 36.8 (03 Jan 2019 12:36)  T(F): 98 (03 Jan 2019 23:24), Max: 98.2 (03 Jan 2019 12:36)  HR: 70 (03 Jan 2019 23:24) (69 - 73)  BP: 137/69 (03 Jan 2019 23:24) (137/69 - 142/76)  BP(mean): --  RR: 19 (03 Jan 2019 23:24) (19 - 20)  SpO2: 95% (03 Jan 2019 23:24) (95% - 98%)    PHYSICAL EXAM:  GENERAL: NAD, lying in bed comfortably, speaking in full sentences  HEAD:  Atraumatic, Normocephalic  EYES: EOMI, PERRLA, conjunctiva and sclera clear  ENT: Moist mucous membranes  NECK: Supple, No JVD  CHEST/LUNG: + right chest tube, dressing clean dry and intact. RLL crackles, no rhonchi, wheezing, or rubs, nonlabored respirations  HEART: Regular rate and rhythm; No murmurs, rubs, or gallops  ABDOMEN: Bowel sounds present; Soft, Nontender, Nondistended, +mild ascites   EXTREMITIES:  2+ Peripheral Pulses, brisk capillary refill. No clubbing, cyanosis, or edema  NERVOUS SYSTEM:  Alert & Oriented X3, moves all 4 extremities. No deficits   SKIN: No rashes or lesions    LABS:                        8.6    2.7   )-----------( 189      ( 04 Jan 2019 09:14 )             28.6     01-04    130<L>  |  92<L>  |  35.0<H>  ----------------------------<  72  4.9   |  25.0  |  5.31<H>    Ca    8.0<L>      04 Jan 2019 09:19    CAPILLARY BLOOD GLUCOSE  POCT Blood Glucose.: 84 mg/dL (04 Jan 2019 07:29)  POCT Blood Glucose.: 157 mg/dL (03 Jan 2019 21:35)  POCT Blood Glucose.: 119 mg/dL (03 Jan 2019 16:26)  POCT Blood Glucose.: 146 mg/dL (03 Jan 2019 11:23)
[facility]  INFECTIOUS DISEASES AND INTERNAL MEDICINE at [place]  =======================================================  Jose Carlos Servin MD  Diplomates American Board of Internal Medicine and Infectious Diseases  =======================================================    [facility]-[id number]  JAROCHO MORALES   follow up for: influenza  patient seen and examined.     no longer on isolation for flu  reports feeling better  still with Right sided chest tube.       ===================================================  REVIEW OF SYSTEMS:  as above  all other ROS negative    =======================================================  Allergies  No Known Allergies  =======================================================  Antibiotics:  oseltamivir 30 milliGRAM(s) Oral <User Schedule>    Other medications:  aspirin enteric coated 81 milliGRAM(s) Oral daily  calcitriol   Capsule 0.25 MICROGram(s) Oral daily  calcium carbonate   1250 mG (OsCal) 1 Tablet(s) Oral two times a day  carvedilol 12.5 milliGRAM(s) Oral every 12 hours  chlorhexidine 2% Cloths 1 Application(s) Topical <User Schedule>  dextrose 5%. 1000 milliLiter(s) IV Continuous <Continuous>  dextrose 50% Injectable 12.5 Gram(s) IV Push once  dextrose 50% Injectable 25 Gram(s) IV Push once  dextrose 50% Injectable 25 Gram(s) IV Push once  docusate sodium 100 milliGRAM(s) Oral two times a day  heparin  Injectable 5000 Unit(s) SubCutaneous every 8 hours  insulin glargine Injectable (LANTUS) 10 Unit(s) SubCutaneous at bedtime  insulin lispro (HumaLOG) corrective regimen sliding scale   SubCutaneous three times a day before meals  insulin lispro (HumaLOG) corrective regimen sliding scale   SubCutaneous at bedtime  pantoprazole    Tablet 40 milliGRAM(s) Oral before breakfast  sacubitril 97 mG/valsartan 103 mG 1 Tablet(s) Oral two times a day    =======================================================    Physical Exam:  T(F): 98.7 (02 Jan 2019 23:23), Max: 99 (01 Jan 2019 15:39)  HR: 76 (02 Jan 2019 23:23)  BP: 132/72 (02 Jan 2019 23:23)  RR: 20 (02 Jan 2019 23:23)  SpO2: 92% (02 Jan 2019 23:23) (90% - 95%)    General:  No acute distress. older than stated age  Eye: Pupils are equal, round and reactive to light, Extraocular movements are intact, Normal conjunctiva.  HENT: Normocephalic, Oral mucosa is moist, No pharyngeal erythema, No sinus tenderness.  Neck: Supple, No lymphadenopathy.  Respiratory: Lungs with diminished air entry  RIGHT base, improved LEFT base aeration  RIGHT chest tube with clear liquid output.    Cardiovascular: Normal rate, Regular rhythm, No murmur, Good pulses equal in all extremities, trace edema.  Gastrointestinal: Soft, Non-tender, Non-distended, Normal bowel sounds.  Genitourinary: No costovertebral angle tenderness.  Lymphatics: No lymphadenopathy neck,   Musculoskeletal: Normal range of motion, Normal strength.  Integumentary: No rash.  Neurologic: Alert, Oriented, No focal deficits, Cranial Nerves II-XII are grossly intact.  Psychiatric: Appropriate mood & affect.      =======================================================  Labs:                        8.5    2.8   )-----------( 203      ( 02 Jan 2019 06:56 )             27.8     01-02    134<L>  |  92<L>  |  54.0<H>  ----------------------------<  108  4.7   |  27.0  |  5.82<H>    Ca    7.8<L>      02 Jan 2019 06:56  Mg     2.3     01-02    TPro  6.0<L>  /  Alb  3.0<L>  /  TBili  x   /  DBili  x   /  AST  x   /  ALT  x   /  AlkPhos  x   01-01        Culture - Body Fluid with Gram Stain (collected 12-31-18 @ 18:03)  Source: .Body Fluid  Gram Stain (12-31-18 @ 19:32):    Few White blood cells    No organisms seen    Culture - Blood (collected 12-31-18 @ 11:10)  Source: .Blood    Culture - Blood (collected 12-31-18 @ 10:43)  Source: .Blood
Internal Medicine Hospitalist - Dr. Narayan MORALES    [id number]    58y      Female    Patient is a 58y old  Female who presents with a chief complaint of SOB (01 Jan 2019 13:06)    INTERVAL HPI/ OVERNIGHT EVENTS: Patient is seen and examined, getting HD, feeling better, improving SOB, denied fever, chills, chest pain, abd. pain, nausea and vomiting.     REVIEW OF SYSTEMS:    Denied fever, chills, abd. pain, nausea, vomiting, chest pain, headache, dizziness    PHYSICAL EXAM:    Vital Signs Last 24 Hrs  T(C): 36.9 (02 Jan 2019 08:05), Max: 37.2 (01 Jan 2019 15:39)  T(F): 98.5 (02 Jan 2019 08:05), Max: 99 (01 Jan 2019 15:39)  HR: 68 (02 Jan 2019 08:05) (68 - 71)  BP: 131/76 (02 Jan 2019 08:05) (108/61 - 131/76)  BP(mean): --  RR: 18 (02 Jan 2019 08:05) (18 - 20)  SpO2: 94% (02 Jan 2019 08:05) (92% - 94%)    GENERAL: NAD  CHEST/LUNG: positive crackles   HEART: S1S2+ audible  ABDOMEN: Soft, Nontender, Nondistended; Bowel sounds present  EXTREMITIES:  no edema  CNS: AAO X 3  Psychiatry: normal mood    LABS:                        8.5    2.8   )-----------( 203      ( 02 Jan 2019 06:56 )             27.8     01-02    134<L>  |  92<L>  |  54.0<H>  ----------------------------<  108  4.7   |  27.0  |  5.82<H>    Ca    7.8<L>      02 Jan 2019 06:56  Mg     2.3     01-02    TPro  6.0<L>  /  Alb  3.0<L>  /  TBili  x   /  DBili  x   /  AST  x   /  ALT  x   /  AlkPhos  x   01-01            MEDICATIONS  (STANDING):  aspirin enteric coated 81 milliGRAM(s) Oral daily  carvedilol 12.5 milliGRAM(s) Oral every 12 hours  chlorhexidine 2% Cloths 1 Application(s) Topical <User Schedule>  dextrose 5%. 1000 milliLiter(s) (50 mL/Hr) IV Continuous <Continuous>  dextrose 50% Injectable 12.5 Gram(s) IV Push once  dextrose 50% Injectable 25 Gram(s) IV Push once  dextrose 50% Injectable 25 Gram(s) IV Push once  docusate sodium 100 milliGRAM(s) Oral two times a day  insulin glargine Injectable (LANTUS) 10 Unit(s) SubCutaneous at bedtime  insulin lispro (HumaLOG) corrective regimen sliding scale   SubCutaneous three times a day before meals  insulin lispro (HumaLOG) corrective regimen sliding scale   SubCutaneous at bedtime  oseltamivir 30 milliGRAM(s) Oral <User Schedule>  pantoprazole    Tablet 40 milliGRAM(s) Oral before breakfast  sacubitril 97 mG/valsartan 103 mG 1 Tablet(s) Oral two times a day    MEDICATIONS  (PRN):  dextrose 40% Gel 15 Gram(s) Oral once PRN Blood Glucose LESS THAN 70 milliGRAM(s)/deciliter  glucagon  Injectable 1 milliGRAM(s) IntraMuscular once PRN Glucose LESS THAN 70 milligrams/deciliter  senna 2 Tablet(s) Oral at bedtime PRN Constipation      RADIOLOGY & ADDITIONAL TEST    < from: CT Chest No Cont (12.31.18 @ 18:59) >  IMPRESSION: Significant decrease in right pleural effusion, as compared   with CT abdomen/pelvis from earlier today. Small right pneumothorax.     Small residual partially loculated right pleural effusion.    Small left pleural effusion.    Extensive bilateral consolidations and groundglass opacities most likely   due to pulmonary edema. Pneumonia is also in the differential diagnosis.    < end of copied text >
Internal Medicine Hospitalist - Dr. Narayan MORALES    [id number]    58y      Female    Patient is a 58y old  Female who presents with a chief complaint of SOB (01 Jan 2019 09:34)    INTERVAL HPI/ OVERNIGHT EVENTS: Patient is seen and examined, s/p chest tube, report feeling little better today, requiring additional oxygen, denied chest pain, abd. pain, nausea and vomiting    REVIEW OF SYSTEMS:    Denied fever, chills, abd. pain, nausea, vomiting, chest pain, headache, dizziness    PHYSICAL EXAM:    Vital Signs Last 24 Hrs  T(C): 36.8 (01 Jan 2019 07:09), Max: 39.3 (01 Jan 2019 03:16)  T(F): 98.3 (01 Jan 2019 07:09), Max: 102.8 (01 Jan 2019 03:16)  HR: 71 (01 Jan 2019 07:09) (71 - 88)  BP: 111/69 (01 Jan 2019 07:09) (111/69 - 160/70)  BP(mean): --  RR: 18 (01 Jan 2019 07:09) (18 - 34)  SpO2: 92% (01 Jan 2019 07:09) (92% - 100%)    GENERAL: NAD  HEENT: EOMI  Neck: supple  CHEST/LUNG: positive rales b/l   HEART: S1S2+ audible  ABDOMEN: Soft, Nontender, Nondistended; Bowel sounds present  EXTREMITIES:  no edema  CNS: AAO X 3  Psychiatry: normal mood    LABS:                        9.1    4.0   )-----------( 216      ( 31 Dec 2018 10:31 )             30.1     12-31    137  |  92<L>  |  26.0<H>  ----------------------------<  254<H>  4.9   |  30.0<H>  |  3.62<H>    Ca    9.6      31 Dec 2018 10:31    TPro  8.5  /  Alb  4.4  /  TBili  0.9  /  DBili  x   /  AST  43<H>  /  ALT  19  /  AlkPhos  161<H>  12-31    PT/INR - ( 31 Dec 2018 10:31 )   PT: 15.8 sec;   INR: 1.36 ratio         PTT - ( 31 Dec 2018 10:31 )  PTT:30.0 sec        MEDICATIONS  (STANDING):  aspirin enteric coated 81 milliGRAM(s) Oral daily  carvedilol 12.5 milliGRAM(s) Oral every 12 hours  dextrose 5%. 1000 milliLiter(s) (50 mL/Hr) IV Continuous <Continuous>  dextrose 50% Injectable 12.5 Gram(s) IV Push once  dextrose 50% Injectable 25 Gram(s) IV Push once  dextrose 50% Injectable 25 Gram(s) IV Push once  docusate sodium 100 milliGRAM(s) Oral two times a day  insulin glargine Injectable (LANTUS) 10 Unit(s) SubCutaneous at bedtime  insulin lispro (HumaLOG) corrective regimen sliding scale   SubCutaneous three times a day before meals  insulin lispro (HumaLOG) corrective regimen sliding scale   SubCutaneous at bedtime  oseltamivir 30 milliGRAM(s) Oral <User Schedule>  pantoprazole    Tablet 40 milliGRAM(s) Oral before breakfast  sacubitril 97 mG/valsartan 103 mG 1 Tablet(s) Oral two times a day    MEDICATIONS  (PRN):  dextrose 40% Gel 15 Gram(s) Oral once PRN Blood Glucose LESS THAN 70 milliGRAM(s)/deciliter  glucagon  Injectable 1 milliGRAM(s) IntraMuscular once PRN Glucose LESS THAN 70 milligrams/deciliter  senna 2 Tablet(s) Oral at bedtime PRN Constipation      RADIOLOGY & ADDITIONAL TEST    < from: CT Chest No Cont (12.31.18 @ 18:59) >  IMPRESSION: Significant decrease in right pleural effusion, as compared   with CT abdomen/pelvis from earlier today. Small right pneumothorax.     Small residual partially loculated right pleural effusion.    Small left pleural effusion.    Extensive bilateral consolidations and groundglass opacities most likely   due to pulmonary edema. Pneumonia is also in the differential diagnosis.    < end of copied text >
Internal Medicine Hospitalist - Dr. Narayan MORALES    [id number]    58y      Female    Patient is a 58y old  Female who presents with a chief complaint of SOB (03 Jan 2019 11:44)    INTERVAL HPI/ OVERNIGHT EVENTS: Patient is seen and examined, report improving SOB, denied chest pain, abd. pain, nausea and vomiting    REVIEW OF SYSTEMS:    Denied fever, chills, abd. pain, nausea, vomiting, chest pain,  headache, dizziness    PHYSICAL EXAM:    Vital Signs Last 24 Hrs  T(C): 36.8 (03 Jan 2019 12:36), Max: 37.1 (02 Jan 2019 23:23)  T(F): 98.2 (03 Jan 2019 12:36), Max: 98.7 (02 Jan 2019 23:23)  HR: 73 (03 Jan 2019 12:36) (70 - 76)  BP: 139/79 (03 Jan 2019 12:36) (121/73 - 139/79)  BP(mean): --  RR: 20 (03 Jan 2019 12:36) (18 - 20)  SpO2: 98% (03 Jan 2019 12:36) (92% - 98%)    GENERAL: NAD  CHEST/LUNG: air entry improved   HEART: S1S2+ audible  ABDOMEN: Soft, Nontender, Nondistended; Bowel sounds present  EXTREMITIES:  no edema  CNS: AAO X 3    LABS:                        8.9    2.6   )-----------( 208      ( 03 Jan 2019 09:26 )             30.0     01-03    135  |  95<L>  |  29.0<H>  ----------------------------<  167<H>  4.5   |  29.0  |  4.10<H>    Ca    7.9<L>      03 Jan 2019 09:26  Mg     2.3     01-02    TPro  6.0<L>  /  Alb  3.0<L>  /  TBili  x   /  DBili  x   /  AST  x   /  ALT  x   /  AlkPhos  x   01-01      MEDICATIONS  (STANDING):  aspirin enteric coated 81 milliGRAM(s) Oral daily  calcitriol   Capsule 0.25 MICROGram(s) Oral daily  calcium carbonate   1250 mG (OsCal) 1 Tablet(s) Oral two times a day  carvedilol 12.5 milliGRAM(s) Oral every 12 hours  chlorhexidine 2% Cloths 1 Application(s) Topical <User Schedule>  dextrose 5%. 1000 milliLiter(s) (50 mL/Hr) IV Continuous <Continuous>  dextrose 50% Injectable 12.5 Gram(s) IV Push once  dextrose 50% Injectable 25 Gram(s) IV Push once  dextrose 50% Injectable 25 Gram(s) IV Push once  docusate sodium 100 milliGRAM(s) Oral two times a day  heparin  Injectable 5000 Unit(s) SubCutaneous every 8 hours  insulin glargine Injectable (LANTUS) 10 Unit(s) SubCutaneous at bedtime  insulin lispro (HumaLOG) corrective regimen sliding scale   SubCutaneous three times a day before meals  insulin lispro (HumaLOG) corrective regimen sliding scale   SubCutaneous at bedtime  oseltamivir 30 milliGRAM(s) Oral <User Schedule>  pantoprazole    Tablet 40 milliGRAM(s) Oral before breakfast  sacubitril 97 mG/valsartan 103 mG 1 Tablet(s) Oral two times a day    MEDICATIONS  (PRN):  dextrose 40% Gel 15 Gram(s) Oral once PRN Blood Glucose LESS THAN 70 milliGRAM(s)/deciliter  glucagon  Injectable 1 milliGRAM(s) IntraMuscular once PRN Glucose LESS THAN 70 milligrams/deciliter  senna 2 Tablet(s) Oral at bedtime PRN Constipation      RADIOLOGY & ADDITIONAL TEST
NEPHROLOGY INTERVAL HPI/OVERNIGHT EVENTS:      Seen  earlier at HD   Feels better   ID follow up noted     MEDICATIONS  (STANDING):  aspirin enteric coated 81 milliGRAM(s) Oral daily  carvedilol 12.5 milliGRAM(s) Oral every 12 hours  chlorhexidine 2% Cloths 1 Application(s) Topical <User Schedule>  dextrose 5%. 1000 milliLiter(s) (50 mL/Hr) IV Continuous <Continuous>  dextrose 50% Injectable 12.5 Gram(s) IV Push once  dextrose 50% Injectable 25 Gram(s) IV Push once  dextrose 50% Injectable 25 Gram(s) IV Push once  docusate sodium 100 milliGRAM(s) Oral two times a day  heparin  Injectable 5000 Unit(s) SubCutaneous every 8 hours  insulin glargine Injectable (LANTUS) 10 Unit(s) SubCutaneous at bedtime  insulin lispro (HumaLOG) corrective regimen sliding scale   SubCutaneous three times a day before meals  insulin lispro (HumaLOG) corrective regimen sliding scale   SubCutaneous at bedtime  oseltamivir 30 milliGRAM(s) Oral <User Schedule>  pantoprazole    Tablet 40 milliGRAM(s) Oral before breakfast  sacubitril 97 mG/valsartan 103 mG 1 Tablet(s) Oral two times a day    MEDICATIONS  (PRN):  dextrose 40% Gel 15 Gram(s) Oral once PRN Blood Glucose LESS THAN 70 milliGRAM(s)/deciliter  glucagon  Injectable 1 milliGRAM(s) IntraMuscular once PRN Glucose LESS THAN 70 milligrams/deciliter  senna 2 Tablet(s) Oral at bedtime PRN Constipation      Allergies    No Known Allergies    Intolerances        Vital Signs Last 24 Hrs  T(C): 36.8 (2019 12:00), Max: 37.2 (2019 15:39)  T(F): 98.3 (2019 12:00), Max: 99 (2019 15:39)  HR: 68 (2019 08:05) (68 - 71)  BP: 149/70 (2019 12:00) (108/61 - 149/70)  BP(mean): --  RR: 19 (2019 12:00) (18 - 20)  SpO2: 95% (2019 12:00) (92% - 95%)  Daily     Daily Weight in k (2019 08:05)  I&O's Detail    2019 07:  -  2019 07:00  --------------------------------------------------------  IN:  Total IN: 0 mL    OUT:    Chest Tube: 460 mL  Total OUT: 460 mL    Total NET: -460 mL      2019 07:  -  2019 14:42  --------------------------------------------------------  IN:  Total IN: 0 mL    OUT:    Other: 2500 mL  Total OUT: 2500 mL    Total NET: -2500 mL        I&O's Summary    2019 07:  -  2019 07:00  --------------------------------------------------------  IN: 0 mL / OUT: 460 mL / NET: -460 mL    2019 07:  -  2019 14:42  --------------------------------------------------------  IN: 0 mL / OUT: 2500 mL / NET: -2500 mL        PHYSICAL EXAM:  HEAD:  NCAT  NECK: Supple, +JVD  NERVOUS SYSTEM:  Alert & Oriented X3  CHEST/LUNG:EAE , Coarse BS , Some dec BS R base , + R pigtail drain  HEART: Regular rate and rhythm; No rub  ABDOMEN: Soft, Nontender, + ascites   EXTREMITIES:  ++ edema     LABS:                        8.5    2.8   )-----------( 203      ( 2019 06:56 )             27.8     01-02    134<L>  |  92<L>  |  54.0<H>  ----------------------------<  108  4.7   |  27.0  |  5.82<H>    Ca    7.8<L>      2019 06:56  Mg     2.3         TPro  6.0<L>  /  Alb  3.0<L>  /  TBili  x   /  DBili  x   /  AST  x   /  ALT  x   /  AlkPhos  x           Magnesium, Serum: 2.3 mg/dL ( @ 06:56)    ABG - ( 31 Dec 2018 22:08 )  pH, Arterial: 7.45  pH, Blood: x     /  pCO2: 47    /  pO2: 63    / HCO3: 32    / Base Excess: 7.8   /  SaO2: 94                    RADIOLOGY & ADDITIONAL TESTS:
NEPHROLOGY INTERVAL HPI/OVERNIGHT EVENTS:    Examined earlier    MEDICATIONS  (STANDING):  aspirin enteric coated 81 milliGRAM(s) Oral daily  calcitriol   Capsule 0.25 MICROGram(s) Oral daily  calcium carbonate   1250 mG (OsCal) 1 Tablet(s) Oral two times a day  carvedilol 12.5 milliGRAM(s) Oral every 12 hours  chlorhexidine 2% Cloths 1 Application(s) Topical <User Schedule>  dextrose 5%. 1000 milliLiter(s) (50 mL/Hr) IV Continuous <Continuous>  dextrose 50% Injectable 12.5 Gram(s) IV Push once  dextrose 50% Injectable 25 Gram(s) IV Push once  dextrose 50% Injectable 25 Gram(s) IV Push once  docusate sodium 100 milliGRAM(s) Oral two times a day  epoetin gian Injectable 00174 Unit(s) IV Push <User Schedule>  heparin  Injectable 5000 Unit(s) SubCutaneous every 8 hours  insulin glargine Injectable (LANTUS) 4 Unit(s) SubCutaneous at bedtime  insulin lispro (HumaLOG) corrective regimen sliding scale   SubCutaneous three times a day before meals  insulin lispro (HumaLOG) corrective regimen sliding scale   SubCutaneous at bedtime  pantoprazole    Tablet 40 milliGRAM(s) Oral before breakfast  sacubitril 97 mG/valsartan 103 mG 1 Tablet(s) Oral two times a day    MEDICATIONS  (PRN):  dextrose 40% Gel 15 Gram(s) Oral once PRN Blood Glucose LESS THAN 70 milliGRAM(s)/deciliter  glucagon  Injectable 1 milliGRAM(s) IntraMuscular once PRN Glucose LESS THAN 70 milligrams/deciliter  senna 2 Tablet(s) Oral at bedtime PRN Constipation      Allergies    No Known Allergies    Intolerances        Vital Signs Last 24 Hrs  T(C): 36.7 (2019 14:10), Max: 36.9 (2019 08:34)  T(F): 98 (2019 14:10), Max: 98.4 (2019 08:34)  HR: 71 (2019 14:10) (71 - 75)  BP: 162/96 (2019 14:10) (142/76 - 162/96)  BP(mean): --  RR: 18 (2019 14:10) (18 - 18)  SpO2: 99% (2019 14:10) (99% - 99%)  Daily     Daily Weight in k (2019 12:48)    PHYSICAL EXAM:  HEAD:  NCAT  NECK: Supple, +JVD  NERVOUS SYSTEM:  Alert & Oriented X3  CHEST/LUNG:EAE , Coarse BS , Some dec BS R base , + R chest  drain  HEART: Regular rate and rhythm; No rub  ABDOMEN: Soft, Nontender, + ascites   EXTREMITIES:  + edema     LABS:                        9.5    2.2   )-----------( 221      ( 2019 08:56 )             32.0     01-06    137  |  96<L>  |  26.0<H>  ----------------------------<  111  4.8   |  27.0  |  4.29<H>    Ca    8.7      2019 08:56                  RADIOLOGY & ADDITIONAL TESTS:
NEPHROLOGY INTERVAL HPI/OVERNIGHT EVENTS:    Examined earlier    MEDICATIONS  (STANDING):  aspirin enteric coated 81 milliGRAM(s) Oral daily  calcitriol   Capsule 0.25 MICROGram(s) Oral daily  calcium carbonate   1250 mG (OsCal) 1 Tablet(s) Oral two times a day  carvedilol 12.5 milliGRAM(s) Oral every 12 hours  chlorhexidine 2% Cloths 1 Application(s) Topical <User Schedule>  dextrose 5%. 1000 milliLiter(s) (50 mL/Hr) IV Continuous <Continuous>  dextrose 50% Injectable 12.5 Gram(s) IV Push once  dextrose 50% Injectable 25 Gram(s) IV Push once  dextrose 50% Injectable 25 Gram(s) IV Push once  docusate sodium 100 milliGRAM(s) Oral two times a day  epoetin gian Injectable 10140 Unit(s) IV Push <User Schedule>  heparin  Injectable 5000 Unit(s) SubCutaneous every 8 hours  insulin glargine Injectable (LANTUS) 10 Unit(s) SubCutaneous at bedtime  insulin lispro (HumaLOG) corrective regimen sliding scale   SubCutaneous three times a day before meals  insulin lispro (HumaLOG) corrective regimen sliding scale   SubCutaneous at bedtime  pantoprazole    Tablet 40 milliGRAM(s) Oral before breakfast  sacubitril 97 mG/valsartan 103 mG 1 Tablet(s) Oral two times a day    MEDICATIONS  (PRN):  dextrose 40% Gel 15 Gram(s) Oral once PRN Blood Glucose LESS THAN 70 milliGRAM(s)/deciliter  glucagon  Injectable 1 milliGRAM(s) IntraMuscular once PRN Glucose LESS THAN 70 milligrams/deciliter  senna 2 Tablet(s) Oral at bedtime PRN Constipation      Allergies    No Known Allergies    Intolerances        Vital Signs Last 24 Hrs  T(C): 36.8 (2019 08:16), Max: 37.4 (2019 21:25)  T(F): 98.2 (2019 08:16), Max: 99.4 (2019 21:25)  HR: 73 (2019 08:16) (66 - 78)  BP: 148/79 (2019 08:16) (138/75 - 159/81)  BP(mean): --  RR: 18 (2019 08:16) (18 - 18)  SpO2: 94% (2019 08:16) (92% - 100%)  Daily     Daily Weight in k (2019 21:25)    PHYSICAL EXAM:  HEAD:  NCAT  NECK: Supple, +JVD  NERVOUS SYSTEM:  Alert & Oriented X3  CHEST/LUNG:EAE , Coarse BS , Some dec BS R base , + R chest  drain  HEART: Regular rate and rhythm; No rub  ABDOMEN: Soft, Nontender, + ascites   EXTREMITIES:  + edema       LABS:                        8.6    2.7   )-----------( 189      ( 2019 09:14 )             28.6     01-04    130<L>  |  92<L>  |  35.0<H>  ----------------------------<  72  4.9   |  25.0  |  5.31<H>    Ca    8.0<L>      2019 09:19                  RADIOLOGY & ADDITIONAL TESTS:
NEPHROLOGY INTERVAL HPI/OVERNIGHT EVENTS:    Examined earlier    MEDICATIONS  (STANDING):  aspirin enteric coated 81 milliGRAM(s) Oral daily  calcitriol   Capsule 0.25 MICROGram(s) Oral daily  calcium carbonate   1250 mG (OsCal) 1 Tablet(s) Oral two times a day  carvedilol 12.5 milliGRAM(s) Oral every 12 hours  chlorhexidine 2% Cloths 1 Application(s) Topical <User Schedule>  dextrose 5%. 1000 milliLiter(s) (50 mL/Hr) IV Continuous <Continuous>  dextrose 50% Injectable 12.5 Gram(s) IV Push once  dextrose 50% Injectable 25 Gram(s) IV Push once  dextrose 50% Injectable 25 Gram(s) IV Push once  docusate sodium 100 milliGRAM(s) Oral two times a day  epoetin gian Injectable 84202 Unit(s) IV Push <User Schedule>  heparin  Injectable 5000 Unit(s) SubCutaneous every 8 hours  insulin glargine Injectable (LANTUS) 4 Unit(s) SubCutaneous at bedtime  insulin lispro (HumaLOG) corrective regimen sliding scale   SubCutaneous three times a day before meals  insulin lispro (HumaLOG) corrective regimen sliding scale   SubCutaneous at bedtime  pantoprazole    Tablet 40 milliGRAM(s) Oral before breakfast  sacubitril 97 mG/valsartan 103 mG 1 Tablet(s) Oral two times a day    MEDICATIONS  (PRN):  dextrose 40% Gel 15 Gram(s) Oral once PRN Blood Glucose LESS THAN 70 milliGRAM(s)/deciliter  glucagon  Injectable 1 milliGRAM(s) IntraMuscular once PRN Glucose LESS THAN 70 milligrams/deciliter  senna 2 Tablet(s) Oral at bedtime PRN Constipation      Allergies    No Known Allergies    Intolerances        Vital Signs Last 24 Hrs  T(C): 36.9 (06 Jan 2019 09:12), Max: 36.9 (06 Jan 2019 09:12)  T(F): 98.5 (06 Jan 2019 09:12), Max: 98.5 (06 Jan 2019 09:12)  HR: 69 (06 Jan 2019 09:12) (69 - 71)  BP: 152/76 (06 Jan 2019 09:12) (152/76 - 152/80)  BP(mean): --  RR: 19 (06 Jan 2019 09:12) (19 - 19)  SpO2: 93% (06 Jan 2019 09:12) (93% - 97%)  Daily     Daily     PHYSICAL EXAM:  HEAD:  NCAT  NECK: Supple, +JVD  NERVOUS SYSTEM:  Alert & Oriented X3  CHEST/LUNG:EAE , Coarse BS , Some dec BS R base , + R chest  drain  HEART: Regular rate and rhythm; No rub  ABDOMEN: Soft, Nontender, + ascites   EXTREMITIES:  + edema       LABS:                        9.5    2.2   )-----------( 221      ( 06 Jan 2019 08:56 )             32.0     01-06    137  |  96<L>  |  26.0<H>  ----------------------------<  111  4.8   |  27.0  |  4.29<H>    Ca    8.7      06 Jan 2019 08:56                  RADIOLOGY & ADDITIONAL TESTS:
NEPHROLOGY INTERVAL HPI/OVERNIGHT EVENTS:    Examined earlier    MEDICATIONS  (STANDING):  aspirin enteric coated 81 milliGRAM(s) Oral daily  calcitriol   Capsule 0.25 MICROGram(s) Oral daily  calcium carbonate   1250 mG (OsCal) 1 Tablet(s) Oral two times a day  carvedilol 12.5 milliGRAM(s) Oral every 12 hours  chlorhexidine 2% Cloths 1 Application(s) Topical <User Schedule>  dextrose 5%. 1000 milliLiter(s) (50 mL/Hr) IV Continuous <Continuous>  dextrose 50% Injectable 12.5 Gram(s) IV Push once  dextrose 50% Injectable 25 Gram(s) IV Push once  dextrose 50% Injectable 25 Gram(s) IV Push once  docusate sodium 100 milliGRAM(s) Oral two times a day  heparin  Injectable 5000 Unit(s) SubCutaneous every 8 hours  insulin glargine Injectable (LANTUS) 10 Unit(s) SubCutaneous at bedtime  insulin lispro (HumaLOG) corrective regimen sliding scale   SubCutaneous three times a day before meals  insulin lispro (HumaLOG) corrective regimen sliding scale   SubCutaneous at bedtime  oseltamivir 30 milliGRAM(s) Oral <User Schedule>  pantoprazole    Tablet 40 milliGRAM(s) Oral before breakfast  sacubitril 97 mG/valsartan 103 mG 1 Tablet(s) Oral two times a day    MEDICATIONS  (PRN):  dextrose 40% Gel 15 Gram(s) Oral once PRN Blood Glucose LESS THAN 70 milliGRAM(s)/deciliter  glucagon  Injectable 1 milliGRAM(s) IntraMuscular once PRN Glucose LESS THAN 70 milligrams/deciliter  senna 2 Tablet(s) Oral at bedtime PRN Constipation      Allergies    No Known Allergies    Intolerances        Vital Signs Last 24 Hrs  T(C): 36.7 (04 Jan 2019 15:44), Max: 36.8 (04 Jan 2019 08:00)  T(F): 98.1 (04 Jan 2019 15:44), Max: 98.2 (04 Jan 2019 08:00)  HR: 71 (04 Jan 2019 15:44) (70 - 72)  BP: 138/75 (04 Jan 2019 15:44) (135/70 - 138/75)  BP(mean): --  RR: 18 (04 Jan 2019 15:44) (18 - 19)  SpO2: 99% (04 Jan 2019 15:44) (94% - 99%)  Daily     Daily     PHYSICAL EXAM:  HEAD:  NCAT  NECK: Supple, +JVD  NERVOUS SYSTEM:  Alert & Oriented X3  CHEST/LUNG:EAE , Coarse BS , Some dec BS R base , + R chest  drain  HEART: Regular rate and rhythm; No rub  ABDOMEN: Soft, Nontender, + ascites   EXTREMITIES:  + edema       LABS:                        8.6    2.7   )-----------( 189      ( 04 Jan 2019 09:14 )             28.6     01-04    130<L>  |  92<L>  |  35.0<H>  ----------------------------<  72  4.9   |  25.0  |  5.31<H>    Ca    8.0<L>      04 Jan 2019 09:19                  RADIOLOGY & ADDITIONAL TESTS:
NEPHROLOGY INTERVAL HPI/OVERNIGHT EVENTS:    Examined earlier    MEDICATIONS  (STANDING):  aspirin enteric coated 81 milliGRAM(s) Oral daily  carvedilol 12.5 milliGRAM(s) Oral every 12 hours  dextrose 5%. 1000 milliLiter(s) (50 mL/Hr) IV Continuous <Continuous>  dextrose 50% Injectable 12.5 Gram(s) IV Push once  dextrose 50% Injectable 25 Gram(s) IV Push once  dextrose 50% Injectable 25 Gram(s) IV Push once  docusate sodium 100 milliGRAM(s) Oral two times a day  insulin glargine Injectable (LANTUS) 10 Unit(s) SubCutaneous at bedtime  insulin lispro (HumaLOG) corrective regimen sliding scale   SubCutaneous three times a day before meals  insulin lispro (HumaLOG) corrective regimen sliding scale   SubCutaneous at bedtime  oseltamivir 30 milliGRAM(s) Oral <User Schedule>  pantoprazole    Tablet 40 milliGRAM(s) Oral before breakfast  sacubitril 97 mG/valsartan 103 mG 1 Tablet(s) Oral two times a day    MEDICATIONS  (PRN):  dextrose 40% Gel 15 Gram(s) Oral once PRN Blood Glucose LESS THAN 70 milliGRAM(s)/deciliter  glucagon  Injectable 1 milliGRAM(s) IntraMuscular once PRN Glucose LESS THAN 70 milligrams/deciliter  senna 2 Tablet(s) Oral at bedtime PRN Constipation      Allergies    No Known Allergies    Intolerances        Vital Signs Last 24 Hrs  T(C): 36.8 (01 Jan 2019 07:09), Max: 39.3 (01 Jan 2019 03:16)  T(F): 98.3 (01 Jan 2019 07:09), Max: 102.8 (01 Jan 2019 03:16)  HR: 71 (01 Jan 2019 07:09) (71 - 88)  BP: 111/69 (01 Jan 2019 07:09) (111/69 - 179/89)  BP(mean): --  RR: 18 (01 Jan 2019 07:09) (18 - 34)  SpO2: 92% (01 Jan 2019 07:09) (92% - 100%)  Daily     Daily     PHYSICAL EXAM:  GENERAL: Thin, frail  HEAD:  NCAT  NECK: Supple, +JVD  NERVOUS SYSTEM:  Alert & Oriented X3  CHEST/LUNG:EAE , Coarse BS , Some dec BS R base   HEART: Regular rate and rhythm; No rub  ABDOMEN: Soft, Nontender, + ascites   EXTREMITIES:  ++ edema       LABS:                        9.1    4.0   )-----------( 216      ( 31 Dec 2018 10:31 )             30.1     12-31    137  |  92<L>  |  26.0<H>  ----------------------------<  254<H>  4.9   |  30.0<H>  |  3.62<H>    Ca    9.6      31 Dec 2018 10:31    TPro  8.5  /  Alb  4.4  /  TBili  0.9  /  DBili  x   /  AST  43<H>  /  ALT  19  /  AlkPhos  161<H>  12-31    PT/INR - ( 31 Dec 2018 10:31 )   PT: 15.8 sec;   INR: 1.36 ratio         PTT - ( 31 Dec 2018 10:31 )  PTT:30.0 sec      ABG - ( 31 Dec 2018 22:08 )  pH, Arterial: 7.45  pH, Blood: x     /  pCO2: 47    /  pO2: 63    / HCO3: 32    / Base Excess: 7.8   /  SaO2: 94                    RADIOLOGY & ADDITIONAL TESTS:
NEPHROLOGY INTERVAL HPI/OVERNIGHT EVENTS:    Interim noted   Feels better   (-) 2.5 kg with HD 1-2   + R CT draining     MEDICATIONS  (STANDING):  aspirin enteric coated 81 milliGRAM(s) Oral daily  calcitriol   Capsule 0.25 MICROGram(s) Oral daily  calcium carbonate   1250 mG (OsCal) 1 Tablet(s) Oral two times a day  carvedilol 12.5 milliGRAM(s) Oral every 12 hours  chlorhexidine 2% Cloths 1 Application(s) Topical <User Schedule>  dextrose 5%. 1000 milliLiter(s) (50 mL/Hr) IV Continuous <Continuous>  dextrose 50% Injectable 12.5 Gram(s) IV Push once  dextrose 50% Injectable 25 Gram(s) IV Push once  dextrose 50% Injectable 25 Gram(s) IV Push once  docusate sodium 100 milliGRAM(s) Oral two times a day  heparin  Injectable 5000 Unit(s) SubCutaneous every 8 hours  insulin glargine Injectable (LANTUS) 10 Unit(s) SubCutaneous at bedtime  insulin lispro (HumaLOG) corrective regimen sliding scale   SubCutaneous three times a day before meals  insulin lispro (HumaLOG) corrective regimen sliding scale   SubCutaneous at bedtime  oseltamivir 30 milliGRAM(s) Oral <User Schedule>  pantoprazole    Tablet 40 milliGRAM(s) Oral before breakfast  sacubitril 97 mG/valsartan 103 mG 1 Tablet(s) Oral two times a day    MEDICATIONS  (PRN):  dextrose 40% Gel 15 Gram(s) Oral once PRN Blood Glucose LESS THAN 70 milliGRAM(s)/deciliter  glucagon  Injectable 1 milliGRAM(s) IntraMuscular once PRN Glucose LESS THAN 70 milligrams/deciliter  senna 2 Tablet(s) Oral at bedtime PRN Constipation      Allergies    No Known Allergies    Intolerances      ALLERY AND IMMUNOLOGIC: No hives or eczema      Vital Signs Last 24 Hrs  T(C): 36.6 (03 Jan 2019 15:19), Max: 37.1 (02 Jan 2019 23:23)  T(F): 97.8 (03 Jan 2019 15:19), Max: 98.7 (02 Jan 2019 23:23)  HR: 69 (03 Jan 2019 15:19) (69 - 76)  BP: 142/76 (03 Jan 2019 15:19) (121/73 - 142/76)  BP(mean): --  RR: 19 (03 Jan 2019 15:19) (19 - 20)  SpO2: 95% (03 Jan 2019 15:19) (92% - 98%)  Daily     Daily   I&O's Detail    02 Jan 2019 07:01  -  03 Jan 2019 07:00  --------------------------------------------------------  IN:  Total IN: 0 mL    OUT:    Chest Tube: 350 mL    Other: 2500 mL  Total OUT: 2850 mL    Total NET: -2850 mL      03 Jan 2019 07:01  -  03 Jan 2019 16:09  --------------------------------------------------------  IN:  Total IN: 0 mL    OUT:    Chest Tube: 100 mL  Total OUT: 100 mL    Total NET: -100 mL        I&O's Summary    02 Jan 2019 07:01  -  03 Jan 2019 07:00  --------------------------------------------------------  IN: 0 mL / OUT: 2850 mL / NET: -2850 mL    03 Jan 2019 07:01  -  03 Jan 2019 16:09  --------------------------------------------------------  IN: 0 mL / OUT: 100 mL / NET: -100 mL        PHYSICAL EXAM:  HEAD:  NCAT  NECK: Supple, +JVD  NERVOUS SYSTEM:  Alert & Oriented X3  CHEST/LUNG:EAE , Coarse BS , Some dec BS R base , + R chest  drain  HEART: Regular rate and rhythm; No rub  ABDOMEN: Soft, Nontender, + ascites   EXTREMITIES:  + edema     LABS:                        8.9    2.6   )-----------( 208      ( 03 Jan 2019 09:26 )             30.0     01-03    135  |  95<L>  |  29.0<H>  ----------------------------<  167<H>  4.5   |  29.0  |  4.10<H>    Ca    7.9<L>      03 Jan 2019 09:26  Mg     2.3     01-02                  RADIOLOGY & ADDITIONAL TESTS:
PULMONARY PROGRESS NOTE      FRANNIE MORALESEARNEST-[id number]    Patient is a 58y old  Female who presents with a chief complaint of SOB (04 Jan 2019 10:30)    	  INTERVAL HPI/OVERNIGHT EVENTS:  No distress on RA  Chest tube with minimal drainage  Ctology reported negative    MEDICATIONS  (STANDING):  aspirin enteric coated 81 milliGRAM(s) Oral daily  calcitriol   Capsule 0.25 MICROGram(s) Oral daily  calcium carbonate   1250 mG (OsCal) 1 Tablet(s) Oral two times a day  carvedilol 12.5 milliGRAM(s) Oral every 12 hours  chlorhexidine 2% Cloths 1 Application(s) Topical <User Schedule>  dextrose 5%. 1000 milliLiter(s) (50 mL/Hr) IV Continuous <Continuous>  dextrose 50% Injectable 12.5 Gram(s) IV Push once  dextrose 50% Injectable 25 Gram(s) IV Push once  dextrose 50% Injectable 25 Gram(s) IV Push once  docusate sodium 100 milliGRAM(s) Oral two times a day  heparin  Injectable 5000 Unit(s) SubCutaneous every 8 hours  insulin glargine Injectable (LANTUS) 10 Unit(s) SubCutaneous at bedtime  insulin lispro (HumaLOG) corrective regimen sliding scale   SubCutaneous three times a day before meals  insulin lispro (HumaLOG) corrective regimen sliding scale   SubCutaneous at bedtime  oseltamivir 30 milliGRAM(s) Oral <User Schedule>  pantoprazole    Tablet 40 milliGRAM(s) Oral before breakfast  sacubitril 97 mG/valsartan 103 mG 1 Tablet(s) Oral two times a day      MEDICATIONS  (PRN):  dextrose 40% Gel 15 Gram(s) Oral once PRN Blood Glucose LESS THAN 70 milliGRAM(s)/deciliter  glucagon  Injectable 1 milliGRAM(s) IntraMuscular once PRN Glucose LESS THAN 70 milligrams/deciliter  senna 2 Tablet(s) Oral at bedtime PRN Constipation      Allergies    No Known Allergies    Intolerances        PAST MEDICAL & SURGICAL HISTORY:  Coronary artery disease, angina presence unspecified, unspecified vessel or lesion type, unspecified whether native or transplanted heart  Paroxysmal atrial fibrillation  Anemia due to chronic kidney disease, unspecified CKD stage  Chronic systolic congestive heart failure  Pleural effusion  Pericardial effusion  End stage renal disease on dialysis  HLD (hyperlipidemia)  HTN (hypertension)  DM (diabetes mellitus)  A-V fistula  Encounter for dialysis catheter care      SOCIAL HISTORY  Smoking History:       REVIEW OF SYSTEMS:    CONSTITUTIONAL:  No distress    HEENT:  Eyes:  No diplopia or blurred vision. ENT:  No earache, sore throat or runny nose.    CARDIOVASCULAR:  No pressure, squeezing, tightness, heaviness or aching about the chest; no palpitations.    RESPIRATORY:  No cough, shortness of breath, PND or orthopnea. Mild SOBOE    GASTROINTESTINAL:  No nausea, vomiting or diarrhea.    GENITOURINARY:  No dysuria, frequency or urgency.    NEUROLOGIC:  No paresthesias, fasciculations, seizures or weakness.    Extremities: No cyanosis, clubbing or edema    PSYCHIATRIC:  No disorder of thought or mood.    Vital Signs Last 24 Hrs  T(C): 36.7 (04 Jan 2019 15:44), Max: 36.8 (04 Jan 2019 08:00)  T(F): 98.1 (04 Jan 2019 15:44), Max: 98.2 (04 Jan 2019 08:00)  HR: 71 (04 Jan 2019 15:44) (70 - 72)  BP: 138/75 (04 Jan 2019 15:44) (135/70 - 138/75)  BP(mean): --  RR: 18 (04 Jan 2019 15:44) (18 - 19)  SpO2: 99% (04 Jan 2019 15:44) (94% - 99%)    PHYSICAL EXAMINATION:    GENERAL: The patient is awake and alert in no apparent distress.     HEENT: Head is normocephalic and atraumatic. Extraocular muscles are intact. Mucous membranes are moist.    NECK: Supple.    LUNGS: Clear to auscultation without wheezing, rales or rhonchi; respirations unlabored, rt chest tube    HEART: Regular rate and rhythm without murmur.    ABDOMEN: Soft, nontender, and nondistended.      EXTREMITIES: Without any cyanosis, clubbing, rash, lesions or edema. av [id number]    NEUROLOGIC: Grossly intact.    LABS:                        8.6    2.7   )-----------( 189      ( 04 Jan 2019 09:14 )             28.6     01-04    130<L>  |  92<L>  |  35.0<H>  ----------------------------<  72  4.9   |  25.0  |  5.31<H>    Ca    8.0<L>      04 Jan 2019 09:19                          MICROBIOLOGY:    RADIOLOGY & ADDITIONAL STUDIES:  < from: Xray Chest 1 View- PORTABLE-Routine (01.04.19 @ 05:33) >   EXAM:  XR CHEST PORTABLE ROUTINE 1V                          PROCEDURE DATE:  01/04/2019        All films reviewed on PACS  INTERPRETATION:  Portable chest radiograph        CLINICAL INFORMATION:   RIGHT pleural effusion. Pigtail catheter   placement. Follow-up.    TECHNIQUE:  Portable  AP view of the chest was obtained.    COMPARISON: 1/3/2019 available for review.    FINDINGS:   The lungs  RIGHT effusion cleared with residual RIGHT lower lobe airspace   consolidation. There is LEFT lower lobe retrocardiac airspace   consolidation and/or effusion.    The  heart is enlarged in transverse diameter. No hilar mass. Trachea   midline.               Visualized osseous structures are intact.        IMPRESSION:   RIGHT pigtail chest tube catheter overlying RIGHT basilar   lateral costophrenic sulcus . no pleural effusion. No pneumothorax.   Residual bilateral pulmonary infiltrates, multifocal airspace   consolidations and LEFT effusion.                    NISHA ZAVALA M.D., ATTENDING RADIOLOGIST  This document hasbeen electronically signed. Jan 4 2019  3:16PM    < end of copied text >    All films reviewed on PACS
Patient seen and examined.  Denies CP, SOB, N/V.  [place] speaking.      T(C): 36.7 (01-03-19 @ 23:24)  T(F): 98 (01-03-19 @ 23:24)  HR: 70 (01-03-19 @ 23:24)  BP: 137/69 (01-03-19 @ 23:24)  BP(mean): --  ABP: --  ABP(mean): --  RR: 19 (01-03-19 @ 23:24)  SpO2: 95% (01-03-19 @ 23:24)  Wt(kg): --  CVP(mm Hg): --  CI: --  PA: --  PA(mean): --  PA(direct): --  SVRI: --      Physical Exam:  Gen: A&Ox2  Pulm:  rales b/l  Ct to suction, no airleak  CV:  S1S2, RRR,   Abd: +BS, soft, NT, ND  Ext:  +DP b/l, no c/c/e  Incision:  c/d/i no click, no exudate    I&O's Detail    03 Jan 2019 07:01  -  04 Jan 2019 07:00  --------------------------------------------------------  IN:  Total IN: 0 mL    OUT:    Chest Tube: 180 mL  Total OUT: 180 mL    Total NET: -180 mL                              8.6    2.7   )-----------( 189      ( 04 Jan 2019 09:14 )             28.6   01-04    130<L>  |  92<L>  |  35.0<H>  ----------------------------<  72  4.9   |  25.0  |  5.31<H>    Ca    8.0<L>      04 Jan 2019 09:19        CAPILLARY BLOOD GLUCOSE      POCT Blood Glucose.: 84 mg/dL (04 Jan 2019 07:29)        Medications:  aspirin enteric coated 81 milliGRAM(s) Oral daily  calcitriol   Capsule 0.25 MICROGram(s) Oral daily  calcium carbonate   1250 mG (OsCal) 1 Tablet(s) Oral two times a day  carvedilol 12.5 milliGRAM(s) Oral every 12 hours  chlorhexidine 2% Cloths 1 Application(s) Topical <User Schedule>  dextrose 40% Gel 15 Gram(s) Oral once PRN  dextrose 5%. 1000 milliLiter(s) IV Continuous <Continuous>  dextrose 50% Injectable 12.5 Gram(s) IV Push once  dextrose 50% Injectable 25 Gram(s) IV Push once  dextrose 50% Injectable 25 Gram(s) IV Push once  docusate sodium 100 milliGRAM(s) Oral two times a day  glucagon  Injectable 1 milliGRAM(s) IntraMuscular once PRN  heparin  Injectable 5000 Unit(s) SubCutaneous every 8 hours  insulin glargine Injectable (LANTUS) 10 Unit(s) SubCutaneous at bedtime  insulin lispro (HumaLOG) corrective regimen sliding scale   SubCutaneous three times a day before meals  insulin lispro (HumaLOG) corrective regimen sliding scale   SubCutaneous at bedtime  oseltamivir 30 milliGRAM(s) Oral <User Schedule>  pantoprazole    Tablet 40 milliGRAM(s) Oral before breakfast  sacubitril 97 mG/valsartan 103 mG 1 Tablet(s) Oral two times a day  senna 2 Tablet(s) Oral at bedtime PRN      CXR: no ptx        Plan:
Patient: JAROCHO MORALES [id number] 58y Female                           Internal Medicine Hospitalist Progress Note    Initial HPI:  57 y/o [place] speaking female, with PMH of ESRD, DM, PAF, NICM / CHF with recent thoracentesis, presented with SOB, found to have right pleural effusion and +Flu. Now s/p chest tube placement 12/31, followed by CT Surgery.  Chest tube was removed 1/5.    Interval History:  Pt was seen with  via phone, and SW at bedside.  Denies complaints.  no SOB.  No chest pain / palpitations. Reports getting OOB without difficulty.  No additional complaints.  Awaiting HD.  Overall feels at baseline.     ____________________PHYSICAL EXAM:  Vitals reviewed as indicated below  GENERAL:  NAD Alert and Oriented x 3   HEENT: NCAT  CARDIOVASCULAR:  S1, S2  LUNGS: CTAB  ABDOMEN:  soft, (-) tenderness, (-) distension, (+) bowel sounds, (-) guarding, (-) rebound (-) rigidity  EXTREMITIES:  no cyanosis / clubbing / edema.   ____________________    VITALS:  Vital Signs Last 24 Hrs  T(C): 36.9 (07 Jan 2019 08:34), Max: 36.9 (06 Jan 2019 16:16)  T(F): 98.4 (07 Jan 2019 08:34), Max: 98.4 (06 Jan 2019 16:16)  HR: 75 (07 Jan 2019 08:34) (72 - 75)  BP: 148/78 (07 Jan 2019 08:34) (142/76 - 148/78)  BP(mean): --  RR: 18 (07 Jan 2019 08:34) (18 - 18)  SpO2: 99% (07 Jan 2019 08:40) (94% - 99%) Daily     Daily   CAPILLARY BLOOD GLUCOSE      POCT Blood Glucose.: 140 mg/dL (07 Jan 2019 11:22)  POCT Blood Glucose.: 105 mg/dL (07 Jan 2019 07:23)  POCT Blood Glucose.: 175 mg/dL (06 Jan 2019 22:59)  POCT Blood Glucose.: 92 mg/dL (06 Jan 2019 16:22)    I&O's Summary      LABS:                        9.5    2.2   )-----------( 221      ( 06 Jan 2019 08:56 )             32.0     01-06    137  |  96<L>  |  26.0<H>  ----------------------------<  111  4.8   |  27.0  |  4.29<H>    Ca    8.7      06 Jan 2019 08:56                  MEDICATIONS:  aspirin enteric coated 81 milliGRAM(s) Oral daily  calcitriol   Capsule 0.25 MICROGram(s) Oral daily  calcium carbonate   1250 mG (OsCal) 1 Tablet(s) Oral two times a day  carvedilol 12.5 milliGRAM(s) Oral every 12 hours  chlorhexidine 2% Cloths 1 Application(s) Topical <User Schedule>  dextrose 40% Gel 15 Gram(s) Oral once PRN  dextrose 5%. 1000 milliLiter(s) IV Continuous <Continuous>  dextrose 50% Injectable 12.5 Gram(s) IV Push once  dextrose 50% Injectable 25 Gram(s) IV Push once  dextrose 50% Injectable 25 Gram(s) IV Push once  docusate sodium 100 milliGRAM(s) Oral two times a day  epoetin gian Injectable 13113 Unit(s) IV Push <User Schedule>  glucagon  Injectable 1 milliGRAM(s) IntraMuscular once PRN  heparin  Injectable 5000 Unit(s) SubCutaneous every 8 hours  insulin glargine Injectable (LANTUS) 4 Unit(s) SubCutaneous at bedtime  insulin lispro (HumaLOG) corrective regimen sliding scale   SubCutaneous three times a day before meals  insulin lispro (HumaLOG) corrective regimen sliding scale   SubCutaneous at bedtime  pantoprazole    Tablet 40 milliGRAM(s) Oral before breakfast  sacubitril 97 mG/valsartan 103 mG 1 Tablet(s) Oral two times a day  senna 2 Tablet(s) Oral at bedtime PRN
Patient: JAROCHO MORALES [id number] 58y Female                           Internal Medicine Hospitalist Progress Note    Initial HPI:  59 y/o Bahamian speaking female, with PMH of ESRD, DM, PAF, NICM / CHF with recent thoracentesis, presented with SOB, found to have right pleural effusion and +Flu. Now s/p chest tube placement 12/31, followed by CT Surgery.  Chest tube was removed 1/5.    Interval History:  Denies complaints.  no SOB.  No chest pain / palpitations. Reports getting OOB.  No additional complaints.     ____________________PHYSICAL EXAM:  Vitals reviewed as indicated below  GENERAL:  NAD Alert and Oriented x 3   HEENT: NCAT  CARDIOVASCULAR:  S1, S2  LUNGS: CTAB  ABDOMEN:  soft, (-) tenderness, (-) distension, (+) bowel sounds, (-) guarding, (-) rebound (-) rigidity  EXTREMITIES:  no cyanosis / clubbing / edema.   ____________________    VITALS:  Vital Signs Last 24 Hrs  T(C): 36.9 (06 Jan 2019 09:12), Max: 36.9 (06 Jan 2019 09:12)  T(F): 98.5 (06 Jan 2019 09:12), Max: 98.5 (06 Jan 2019 09:12)  HR: 69 (06 Jan 2019 09:12) (69 - 71)  BP: 152/76 (06 Jan 2019 09:12) (152/76 - 152/80)  BP(mean): --  RR: 19 (06 Jan 2019 09:12) (19 - 19)  SpO2: 93% (06 Jan 2019 09:12) (93% - 97%) Daily     Daily   CAPILLARY BLOOD GLUCOSE      POCT Blood Glucose.: 151 mg/dL (06 Jan 2019 11:15)  POCT Blood Glucose.: 109 mg/dL (06 Jan 2019 07:37)  POCT Blood Glucose.: 92 mg/dL (05 Jan 2019 22:07)  POCT Blood Glucose.: 151 mg/dL (05 Jan 2019 16:09)  POCT Blood Glucose.: 117 mg/dL (05 Jan 2019 11:47)    I&O's Summary      LABS:                        9.5    2.2   )-----------( 221      ( 06 Jan 2019 08:56 )             32.0     01-06    137  |  96<L>  |  26.0<H>  ----------------------------<  111  4.8   |  27.0  |  4.29<H>    Ca    8.7      06 Jan 2019 08:56                  MEDICATIONS:  aspirin enteric coated 81 milliGRAM(s) Oral daily  calcitriol   Capsule 0.25 MICROGram(s) Oral daily  calcium carbonate   1250 mG (OsCal) 1 Tablet(s) Oral two times a day  carvedilol 12.5 milliGRAM(s) Oral every 12 hours  chlorhexidine 2% Cloths 1 Application(s) Topical <User Schedule>  dextrose 40% Gel 15 Gram(s) Oral once PRN  dextrose 5%. 1000 milliLiter(s) IV Continuous <Continuous>  dextrose 50% Injectable 12.5 Gram(s) IV Push once  dextrose 50% Injectable 25 Gram(s) IV Push once  dextrose 50% Injectable 25 Gram(s) IV Push once  docusate sodium 100 milliGRAM(s) Oral two times a day  epoetin gian Injectable 24798 Unit(s) IV Push <User Schedule>  glucagon  Injectable 1 milliGRAM(s) IntraMuscular once PRN  heparin  Injectable 5000 Unit(s) SubCutaneous every 8 hours  insulin glargine Injectable (LANTUS) 4 Unit(s) SubCutaneous at bedtime  insulin lispro (HumaLOG) corrective regimen sliding scale   SubCutaneous three times a day before meals  insulin lispro (HumaLOG) corrective regimen sliding scale   SubCutaneous at bedtime  pantoprazole    Tablet 40 milliGRAM(s) Oral before breakfast  sacubitril 97 mG/valsartan 103 mG 1 Tablet(s) Oral two times a day  senna 2 Tablet(s) Oral at bedtime PRN
Subjective: "Me lenino efrainr" ([place]- I feel better)    Vital Signs:  Vital Signs Last 24 Hrs  T(C): 36.8 (01-05-19 @ 08:16), Max: 37.4 (01-04-19 @ 21:25)  T(F): 98.2 (01-05-19 @ 08:16), Max: 99.4 (01-04-19 @ 21:25)  HR: 73 (01-05-19 @ 08:16) (66 - 78)  BP: 148/79 (01-05-19 @ 08:16) (138/75 - 159/81)  RR: 18 (01-05-19 @ 08:16) (18 - 18)  SpO2: 94% (01-05-19 @ 08:16) (92% - 100%) on (O2)    Telemetry/Alarms:    Relevant labs, radiology and Medications reviewed    Pertinent Physical Exam      01-04 @ 07:01  -  01-05 @ 07:00  --------------------------------------------------------  IN:  Total IN: 0 mL    OUT:    Chest Tube: 60 mL    Other: 2600 mL  Total OUT: 2660 mL    Total NET: -2660 mL
Patient: JAROCHO MORALES [id number] 58y Female                           Internal Medicine Hospitalist Progress Note    Initial HPI:  57 y/o Angolan speaking female, with PMH of ESRD, DM, PAF, NICM / CHF with recent thoracentesis, presented with SOB, found to have right pleural effusion and +Flu. Now s/p chest tube placement , followed by CT Surgery    Interval History:  Seen with  via phone.  Denies complaints.  No SOB.  No pain.  Reports has not been OOB.    ____________________PHYSICAL EXAM:  Vitals reviewed as indicated below  GENERAL:  NAD Alert and Oriented x 3   HEENT: NCAT  CARDIOVASCULAR:  S1, S2  LUNGS: coarse BS b/l.  R chest tube in place.   ABDOMEN:  soft, (-) tenderness, (-) distension, (+) bowel sounds, (-) guarding, (-) rebound (-) rigidity  EXTREMITIES:  no cyanosis / clubbing / edema.   ____________________      BACKGROUND:  HEALTH ISSUES - PROBLEM Dx:  Acute pulmonary edema: Acute pulmonary edema  End stage renal disease on dialysis: End stage renal disease on dialysis  Acute on chronic clinical systolic heart failure: Acute on chronic clinical systolic heart failure  Influenza A: Influenza A  Pleural effusion: Pleural effusion        Allergies    No Known Allergies    Intolerances      PAST MEDICAL & SURGICAL HISTORY:  Coronary artery disease, angina presence unspecified, unspecified vessel or lesion type, unspecified whether native or transplanted heart  Paroxysmal atrial fibrillation  Anemia due to chronic kidney disease, unspecified CKD stage  Chronic systolic congestive heart failure  Pleural effusion  Pericardial effusion  End stage renal disease on dialysis  HLD (hyperlipidemia)  HTN (hypertension)  DM (diabetes mellitus)  A-V fistula  Encounter for dialysis catheter care        VITALS:  Vital Signs Last 24 Hrs  T(C): 36.8 (2019 08:16), Max: 37.4 (2019 21:25)  T(F): 98.2 (2019 08:16), Max: 99.4 (2019 21:25)  HR: 73 (2019 08:16) (66 - 78)  BP: 148/79 (2019 08:16) (138/75 - 159/81)  BP(mean): --  RR: 18 (2019 08:16) (18 - 18)  SpO2: 94% (2019 08:16) (92% - 100%) Daily     Daily Weight in k (2019 21:25)  CAPILLARY BLOOD GLUCOSE      POCT Blood Glucose.: 117 mg/dL (2019 11:47)  POCT Blood Glucose.: 62 mg/dL (2019 07:55)  POCT Blood Glucose.: 142 mg/dL (2019 22:07)  POCT Blood Glucose.: 150 mg/dL (2019 16:31)    I&O's Summary    2019 07:01  -  2019 07:00  --------------------------------------------------------  IN: 0 mL / OUT: 2660 mL / NET: -2660 mL          LABS:                        8.6    2.7   )-----------( 189      ( 2019 09:14 )             28.6     01-04    130<L>  |  92<L>  |  35.0<H>  ----------------------------<  72  4.9   |  25.0  |  5.31<H>    Ca    8.0<L>      2019 09:19                    MEDICATIONS:  MEDICATIONS  (STANDING):  aspirin enteric coated 81 milliGRAM(s) Oral daily  calcitriol   Capsule 0.25 MICROGram(s) Oral daily  calcium carbonate   1250 mG (OsCal) 1 Tablet(s) Oral two times a day  carvedilol 12.5 milliGRAM(s) Oral every 12 hours  chlorhexidine 2% Cloths 1 Application(s) Topical <User Schedule>  dextrose 5%. 1000 milliLiter(s) (50 mL/Hr) IV Continuous <Continuous>  dextrose 50% Injectable 12.5 Gram(s) IV Push once  dextrose 50% Injectable 25 Gram(s) IV Push once  dextrose 50% Injectable 25 Gram(s) IV Push once  docusate sodium 100 milliGRAM(s) Oral two times a day  epoetin gian Injectable 94253 Unit(s) IV Push <User Schedule>  heparin  Injectable 5000 Unit(s) SubCutaneous every 8 hours  insulin glargine Injectable (LANTUS) 10 Unit(s) SubCutaneous at bedtime  insulin lispro (HumaLOG) corrective regimen sliding scale   SubCutaneous three times a day before meals  insulin lispro (HumaLOG) corrective regimen sliding scale   SubCutaneous at bedtime  pantoprazole    Tablet 40 milliGRAM(s) Oral before breakfast  sacubitril 97 mG/valsartan 103 mG 1 Tablet(s) Oral two times a day    MEDICATIONS  (PRN):  dextrose 40% Gel 15 Gram(s) Oral once PRN Blood Glucose LESS THAN 70 milliGRAM(s)/deciliter  glucagon  Injectable 1 milliGRAM(s) IntraMuscular once PRN Glucose LESS THAN 70 milligrams/deciliter  senna 2 Tablet(s) Oral at bedtime PRN Constipation

## 2019-01-07 NOTE — DISCHARGE NOTE ADULT - SECONDARY DIAGNOSIS.
Pleural effusion Acute on chronic clinical systolic heart failure Acute pulmonary edema Coronary artery disease, angina presence unspecified, unspecified vessel or lesion type, unspecified whether native or transplanted heart End stage renal disease on dialysis

## 2019-01-07 NOTE — DISCHARGE NOTE ADULT - CARE PROVIDERS DIRECT ADDRESSES
,DirectAddress_Unknown,[email or web address],DirectAddress_Unknown

## 2019-01-07 NOTE — DISCHARGE NOTE ADULT - CARE PLAN
Principal Discharge DX:	Influenza A  Goal:	stable for discharge  Assessment and plan of treatment:	It is important to see your primary physician as well as the physicians noted below within the next week to perform a comprehensive medical review.  Call their offices for an appointment as soon as you leave the hospital.  If you do not have a primary physician, contact the [facility] [phone] located on [address] [place] [address], [place] [address].  Your medical issues appear to be stable at this time, but if your symptoms recur or worsen, contact your physicians and/or return to the hospital if necessary.  If you encounter any issues or questions with your medication, call your physicians before stopping the medication.  Do not drive.  Limit your diet to 2 grams of sodium daily.  Diabetic Diet.  Secondary Diagnosis:	Pleural effusion  Secondary Diagnosis:	Acute on chronic clinical systolic heart failure  Secondary Diagnosis:	Acute pulmonary edema  Secondary Diagnosis:	Coronary artery disease, angina presence unspecified, unspecified vessel or lesion type, unspecified whether native or transplanted heart  Secondary Diagnosis:	End stage renal disease on dialysis

## 2019-01-07 NOTE — PROGRESS NOTE ADULT - PROVIDER SPECIALTY LIST ADULT
Cardiology
Hospitalist
Infectious Disease
Nephrology
Pulmonology
Thoracic Surgery
Thoracic Surgery
Hospitalist
Infectious Disease

## 2019-01-07 NOTE — DISCHARGE NOTE ADULT - CARE PROVIDER_API CALL
John Aguilar (), Internal Medicine  [address] [address]  Phone: [phone]  Fax: [phone]    Ming Healy), Critical Care Medicine; Internal Medicine; Pulmonary Disease  [address] [place] [address]  Phone: [phone]  Fax: [phone]    Star Ortega), Cardiology; Cardiovascular Disease; Interventional Cardiology  [address] [id number]  Phone: [phone]  Fax: [phone]

## 2019-02-07 ENCOUNTER — APPOINTMENT (OUTPATIENT)
Dept: PULMONOLOGY | Facility: CLINIC | Age: 59
End: 2019-02-07
Payer: MEDICARE

## 2019-02-07 VITALS
HEART RATE: 73 BPM | SYSTOLIC BLOOD PRESSURE: 140 MMHG | OXYGEN SATURATION: 95 % | BODY MASS INDEX: 25.96 KG/M2 | WEIGHT: 156 LBS | DIASTOLIC BLOOD PRESSURE: 80 MMHG

## 2019-02-07 VITALS — RESPIRATION RATE: 16 BRPM

## 2019-02-07 PROCEDURE — 99214 OFFICE O/P EST MOD 30 MIN: CPT

## 2019-02-07 RX ORDER — CARVEDILOL 25 MG/1
25 TABLET, FILM COATED ORAL TWICE DAILY
Qty: 60 | Refills: 5 | Status: DISCONTINUED | COMMUNITY
End: 2019-02-07

## 2019-02-07 RX ORDER — OMEPRAZOLE 40 MG/1
40 CAPSULE, DELAYED RELEASE ORAL
Qty: 28 | Refills: 0 | Status: DISCONTINUED | COMMUNITY
Start: 2018-04-24 | End: 2019-02-07

## 2019-02-07 RX ORDER — AMOXICILLIN 500 MG/1
500 TABLET, FILM COATED ORAL TWICE DAILY
Qty: 56 | Refills: 0 | Status: DISCONTINUED | COMMUNITY
Start: 2018-04-24 | End: 2019-02-07

## 2019-02-07 RX ORDER — HYDRALAZINE HYDROCHLORIDE 50 MG/1
50 TABLET ORAL 3 TIMES DAILY
Refills: 0 | Status: DISCONTINUED | COMMUNITY
End: 2019-02-07

## 2019-02-07 RX ORDER — METRONIDAZOLE 500 MG/1
500 TABLET ORAL TWICE DAILY
Qty: 28 | Refills: 0 | Status: DISCONTINUED | COMMUNITY
Start: 2018-04-24 | End: 2019-02-07

## 2019-02-07 RX ORDER — DULOXETINE HYDROCHLORIDE 30 MG/1
30 CAPSULE, DELAYED RELEASE PELLETS ORAL DAILY
Qty: 60 | Refills: 2 | Status: DISCONTINUED | COMMUNITY
Start: 2017-10-30 | End: 2019-02-07

## 2019-02-07 RX ORDER — SIMVASTATIN 40 MG/1
40 TABLET, FILM COATED ORAL
Qty: 90 | Refills: 3 | Status: DISCONTINUED | COMMUNITY
End: 2019-02-07

## 2019-02-07 RX ORDER — LOSARTAN POTASSIUM AND HYDROCHLOROTHIAZIDE 12.5; 1 MG/1; MG/1
TABLET ORAL
Refills: 0 | Status: DISCONTINUED | COMMUNITY
End: 2019-02-07

## 2019-02-07 RX ORDER — CLARITHROMYCIN 500 MG/1
500 TABLET, FILM COATED ORAL
Qty: 28 | Refills: 0 | Status: DISCONTINUED | COMMUNITY
Start: 2018-04-24 | End: 2019-02-07

## 2019-02-07 RX ORDER — CLONIDINE HYDROCHLORIDE 0.3 MG/1
0.3 TABLET ORAL
Refills: 0 | Status: DISCONTINUED | COMMUNITY
End: 2019-02-07

## 2019-02-07 RX ORDER — DOCUSATE SODIUM 100 MG
CAPSULE ORAL
Refills: 0 | Status: DISCONTINUED | COMMUNITY
End: 2019-02-07

## 2019-02-07 RX ORDER — AMLODIPINE BESYLATE 5 MG/1
5 TABLET ORAL DAILY
Qty: 90 | Refills: 3 | Status: DISCONTINUED | COMMUNITY
End: 2019-02-07

## 2019-02-07 NOTE — PHYSICAL EXAM
[General Appearance - Well Developed] : well developed [Normal Appearance] : normal appearance [Well Groomed] : well groomed [General Appearance - Well Nourished] : well nourished [No Deformities] : no deformities [General Appearance - In No Acute Distress] : no acute distress [Normal Conjunctiva] : the conjunctiva exhibited no abnormalities [Eyelids - No Xanthelasma] : the eyelids demonstrated no xanthelasmas [Normal Oropharynx] : normal oropharynx [Neck Appearance] : the appearance of the neck was normal [Neck Cervical Mass (___cm)] : no neck mass was observed [Jugular Venous Distention Increased] : there was no jugular-venous distention [Thyroid Diffuse Enlargement] : the thyroid was not enlarged [Thyroid Nodule] : there were no palpable thyroid nodules [Heart Rate And Rhythm] : heart rate and rhythm were normal [Heart Sounds] : normal S1 and S2 [Murmurs] : no murmurs present [Respiration, Rhythm And Depth] : normal respiratory rhythm and effort [Exaggerated Use Of Accessory Muscles For Inspiration] : no accessory muscle use [Auscultation Breath Sounds / Voice Sounds] : lungs were clear to auscultation bilaterally [Chest Palpation] : palpation of the chest revealed no abnormalities [Lungs Percussion] : the lungs were normal to percussion [Abdomen Soft] : soft [Abdomen Tenderness] : non-tender [Abdomen Mass (___ Cm)] : no abdominal mass palpated [Abnormal Walk] : normal gait [Gait - Sufficient For Exercise Testing] : the gait was sufficient for exercise testing [Nail Clubbing] : no clubbing of the fingernails [Cyanosis, Localized] : no localized cyanosis [Petechial Hemorrhages (___cm)] : no petechial hemorrhages [Skin Color & Pigmentation] : normal skin color and pigmentation [] : no rash [No Venous Stasis] : no venous stasis [Skin Lesions] : no skin lesions [No Skin Ulcers] : no skin ulcer [No Xanthoma] : no  xanthoma was observed [Deep Tendon Reflexes (DTR)] : deep tendon reflexes were 2+ and symmetric [Sensation] : the sensory exam was normal to light touch and pinprick [No Focal Deficits] : no focal deficits [Oriented To Time, Place, And Person] : oriented to person, place, and time [Impaired Insight] : insight and judgment were intact [Affect] : the affect was normal

## 2019-02-07 NOTE — ASSESSMENT
[FreeTextEntry1] : The patient appears to have recovered. She does not appear to be in fluid overload at this time. I've discontinued oxygen I have asked her to get a followup chest x-ray to be done after her dialysis session tomorrow. This will be followed up by phone. There is no evidence for intrinsic lung disease.

## 2019-02-07 NOTE — REASON FOR VISIT
[Follow-Up - From Hospitalization] : a hospitalization follow-up [Abnormal Chest X-Ray] : abnormal Chest X-Ray [Family Member] : family member [[employer] ] : provided by [employer]

## 2019-02-07 NOTE — HISTORY OF PRESENT ILLNESS
[FreeTextEntry1] : The patient is a 58-year-old  female who has a history of end-stage renal disease on dialysis as well as diastolic dysfunction. She was hospitalized at the beginning of January at [facility] with increasing shortness of breath. She was found to have bilateral infiltrates as well as a large right pleural effusion. The effusion was drained with a chest tube and was transudative in nature. She was found to have influenza. Currently she feels well. She is due for dialysis tomorrow. She denies shortness of breath cough or wheeze. She has some occasional stuffy nose. There's been no chest pain.

## 2019-02-09 ENCOUNTER — INPATIENT (INPATIENT)
Facility: HOSPITAL | Age: 59
LOS: 9 days | Discharge: HOME | DRG: 291 | End: 2019-02-19
Attending: INTERNAL MEDICINE | Admitting: INTERNAL MEDICINE
Payer: MEDICARE

## 2019-02-09 VITALS
DIASTOLIC BLOOD PRESSURE: 94 MMHG | WEIGHT: 149.91 LBS | TEMPERATURE: 98 F | HEART RATE: 91 BPM | RESPIRATION RATE: 20 BRPM | SYSTOLIC BLOOD PRESSURE: 161 MMHG | HEIGHT: 60 IN

## 2019-02-09 DIAGNOSIS — J18.9 PNEUMONIA, UNSPECIFIED ORGANISM: ICD-10-CM

## 2019-02-09 DIAGNOSIS — Z49.01 ENCOUNTER FOR FITTING AND ADJUSTMENT OF EXTRACORPOREAL DIALYSIS CATHETER: Chronic | ICD-10-CM

## 2019-02-09 DIAGNOSIS — J90 PLEURAL EFFUSION, NOT ELSEWHERE CLASSIFIED: ICD-10-CM

## 2019-02-09 DIAGNOSIS — I77.0 ARTERIOVENOUS FISTULA, ACQUIRED: Chronic | ICD-10-CM

## 2019-02-09 LAB
ALBUMIN SERPL ELPH-MCNC: 3.1 G/DL — LOW (ref 3.3–5.2)
ALP SERPL-CCNC: 140 U/L — HIGH (ref 40–120)
ALT FLD-CCNC: 5 U/L — SIGNIFICANT CHANGE UP
ANION GAP SERPL CALC-SCNC: 13 MMOL/L — SIGNIFICANT CHANGE UP (ref 5–17)
APTT BLD: 31.3 SEC — SIGNIFICANT CHANGE UP (ref 27.5–36.3)
AST SERPL-CCNC: 14 U/L — SIGNIFICANT CHANGE UP
BASOPHILS # BLD AUTO: 0 K/UL — SIGNIFICANT CHANGE UP (ref 0–0.2)
BASOPHILS NFR BLD AUTO: 0.5 % — SIGNIFICANT CHANGE UP (ref 0–2)
BILIRUB SERPL-MCNC: 0.5 MG/DL — SIGNIFICANT CHANGE UP (ref 0.4–2)
BUN SERPL-MCNC: 23 MG/DL — HIGH (ref 8–20)
CALCIUM SERPL-MCNC: 9.1 MG/DL — SIGNIFICANT CHANGE UP (ref 8.6–10.2)
CHLORIDE SERPL-SCNC: 96 MMOL/L — LOW (ref 98–107)
CO2 SERPL-SCNC: 27 MMOL/L — SIGNIFICANT CHANGE UP (ref 22–29)
CREAT SERPL-MCNC: 3.69 MG/DL — HIGH (ref 0.5–1.3)
EOSINOPHIL # BLD AUTO: 0.1 K/UL — SIGNIFICANT CHANGE UP (ref 0–0.5)
EOSINOPHIL NFR BLD AUTO: 3.3 % — SIGNIFICANT CHANGE UP (ref 0–6)
GLUCOSE BLDC GLUCOMTR-MCNC: 179 MG/DL — HIGH (ref 70–99)
GLUCOSE SERPL-MCNC: 182 MG/DL — HIGH (ref 70–115)
HCT VFR BLD CALC: 38.5 % — SIGNIFICANT CHANGE UP (ref 37–47)
HGB BLD-MCNC: 11.1 G/DL — LOW (ref 12–16)
INR BLD: 1.19 RATIO — HIGH (ref 0.88–1.16)
LACTATE BLDV-MCNC: 1.7 MMOL/L — SIGNIFICANT CHANGE UP (ref 0.5–2)
LYMPHOCYTES # BLD AUTO: 0.6 K/UL — LOW (ref 1–4.8)
LYMPHOCYTES # BLD AUTO: 14.2 % — LOW (ref 20–55)
MCHC RBC-ENTMCNC: 27.3 PG — SIGNIFICANT CHANGE UP (ref 27–31)
MCHC RBC-ENTMCNC: 28.8 G/DL — LOW (ref 32–36)
MCV RBC AUTO: 94.6 FL — SIGNIFICANT CHANGE UP (ref 81–99)
MONOCYTES # BLD AUTO: 0.4 K/UL — SIGNIFICANT CHANGE UP (ref 0–0.8)
MONOCYTES NFR BLD AUTO: 9.3 % — SIGNIFICANT CHANGE UP (ref 3–10)
NEUTROPHILS # BLD AUTO: 3.1 K/UL — SIGNIFICANT CHANGE UP (ref 1.8–8)
NEUTROPHILS NFR BLD AUTO: 72.5 % — SIGNIFICANT CHANGE UP (ref 37–73)
NT-PROBNP SERPL-SCNC: HIGH PG/ML (ref 0–300)
PLATELET # BLD AUTO: 219 K/UL — SIGNIFICANT CHANGE UP (ref 150–400)
POTASSIUM SERPL-MCNC: 5.4 MMOL/L — HIGH (ref 3.5–5.3)
POTASSIUM SERPL-SCNC: 5.4 MMOL/L — HIGH (ref 3.5–5.3)
PROT SERPL-MCNC: 7.4 G/DL — SIGNIFICANT CHANGE UP (ref 6.6–8.7)
PROTHROM AB SERPL-ACNC: 13.7 SEC — HIGH (ref 10–12.9)
RBC # BLD: 4.07 M/UL — LOW (ref 4.4–5.2)
RBC # FLD: 17.2 % — HIGH (ref 11–15.6)
SODIUM SERPL-SCNC: 136 MMOL/L — SIGNIFICANT CHANGE UP (ref 135–145)
WBC # BLD: 4.3 K/UL — LOW (ref 4.8–10.8)
WBC # FLD AUTO: 4.3 K/UL — LOW (ref 4.8–10.8)

## 2019-02-09 PROCEDURE — 74176 CT ABD & PELVIS W/O CONTRAST: CPT | Mod: 26

## 2019-02-09 PROCEDURE — 99223 1ST HOSP IP/OBS HIGH 75: CPT | Mod: GC

## 2019-02-09 PROCEDURE — 99285 EMERGENCY DEPT VISIT HI MDM: CPT

## 2019-02-09 PROCEDURE — 71250 CT THORAX DX C-: CPT | Mod: 26

## 2019-02-09 PROCEDURE — 93010 ELECTROCARDIOGRAM REPORT: CPT | Mod: 76

## 2019-02-09 PROCEDURE — 71045 X-RAY EXAM CHEST 1 VIEW: CPT | Mod: 26

## 2019-02-09 PROCEDURE — 99221 1ST HOSP IP/OBS SF/LOW 40: CPT

## 2019-02-09 RX ORDER — DEXTROSE 50 % IN WATER 50 %
12.5 SYRINGE (ML) INTRAVENOUS ONCE
Qty: 0 | Refills: 0 | Status: DISCONTINUED | OUTPATIENT
Start: 2019-02-09 | End: 2019-02-19

## 2019-02-09 RX ORDER — ASPIRIN/CALCIUM CARB/MAGNESIUM 324 MG
81 TABLET ORAL DAILY
Qty: 0 | Refills: 0 | Status: DISCONTINUED | OUTPATIENT
Start: 2019-02-09 | End: 2019-02-19

## 2019-02-09 RX ORDER — FERROUS SULFATE 325(65) MG
325 TABLET ORAL DAILY
Qty: 0 | Refills: 0 | Status: DISCONTINUED | OUTPATIENT
Start: 2019-02-10 | End: 2019-02-19

## 2019-02-09 RX ORDER — CEFTRIAXONE 500 MG/1
1 INJECTION, POWDER, FOR SOLUTION INTRAMUSCULAR; INTRAVENOUS EVERY 24 HOURS
Qty: 0 | Refills: 0 | Status: DISCONTINUED | OUTPATIENT
Start: 2019-02-10 | End: 2019-02-10

## 2019-02-09 RX ORDER — AZITHROMYCIN 500 MG/1
500 TABLET, FILM COATED ORAL EVERY 24 HOURS
Qty: 0 | Refills: 0 | Status: DISCONTINUED | OUTPATIENT
Start: 2019-02-10 | End: 2019-02-10

## 2019-02-09 RX ORDER — DEXTROSE 50 % IN WATER 50 %
25 SYRINGE (ML) INTRAVENOUS ONCE
Qty: 0 | Refills: 0 | Status: DISCONTINUED | OUTPATIENT
Start: 2019-02-09 | End: 2019-02-19

## 2019-02-09 RX ORDER — AMLODIPINE BESYLATE 2.5 MG/1
2.5 TABLET ORAL DAILY
Qty: 0 | Refills: 0 | Status: DISCONTINUED | OUTPATIENT
Start: 2019-02-10 | End: 2019-02-19

## 2019-02-09 RX ORDER — PANTOPRAZOLE SODIUM 20 MG/1
40 TABLET, DELAYED RELEASE ORAL
Qty: 0 | Refills: 0 | Status: DISCONTINUED | OUTPATIENT
Start: 2019-02-10 | End: 2019-02-19

## 2019-02-09 RX ORDER — INSULIN LISPRO 100/ML
VIAL (ML) SUBCUTANEOUS
Qty: 0 | Refills: 0 | Status: DISCONTINUED | OUTPATIENT
Start: 2019-02-09 | End: 2019-02-10

## 2019-02-09 RX ORDER — CARVEDILOL PHOSPHATE 80 MG/1
12.5 CAPSULE, EXTENDED RELEASE ORAL EVERY 12 HOURS
Qty: 0 | Refills: 0 | Status: DISCONTINUED | OUTPATIENT
Start: 2019-02-09 | End: 2019-02-19

## 2019-02-09 RX ORDER — GLUCAGON INJECTION, SOLUTION 0.5 MG/.1ML
1 INJECTION, SOLUTION SUBCUTANEOUS ONCE
Qty: 0 | Refills: 0 | Status: DISCONTINUED | OUTPATIENT
Start: 2019-02-09 | End: 2019-02-19

## 2019-02-09 RX ORDER — SENNA PLUS 8.6 MG/1
2 TABLET ORAL AT BEDTIME
Qty: 0 | Refills: 0 | Status: DISCONTINUED | OUTPATIENT
Start: 2019-02-09 | End: 2019-02-10

## 2019-02-09 RX ORDER — DEXTROSE 50 % IN WATER 50 %
15 SYRINGE (ML) INTRAVENOUS ONCE
Qty: 0 | Refills: 0 | Status: DISCONTINUED | OUTPATIENT
Start: 2019-02-09 | End: 2019-02-19

## 2019-02-09 RX ORDER — DOCUSATE SODIUM 100 MG
100 CAPSULE ORAL
Qty: 0 | Refills: 0 | Status: DISCONTINUED | OUTPATIENT
Start: 2019-02-09 | End: 2019-02-10

## 2019-02-09 RX ORDER — AZITHROMYCIN 500 MG/1
500 TABLET, FILM COATED ORAL ONCE
Qty: 0 | Refills: 0 | Status: COMPLETED | OUTPATIENT
Start: 2019-02-09 | End: 2019-02-09

## 2019-02-09 RX ORDER — POLYETHYLENE GLYCOL 3350 17 G/17G
17 POWDER, FOR SOLUTION ORAL DAILY
Qty: 0 | Refills: 0 | Status: DISCONTINUED | OUTPATIENT
Start: 2019-02-10 | End: 2019-02-10

## 2019-02-09 RX ORDER — HEPARIN SODIUM 5000 [USP'U]/ML
5000 INJECTION INTRAVENOUS; SUBCUTANEOUS EVERY 8 HOURS
Qty: 0 | Refills: 0 | Status: DISCONTINUED | OUTPATIENT
Start: 2019-02-09 | End: 2019-02-11

## 2019-02-09 RX ORDER — SACUBITRIL AND VALSARTAN 24; 26 MG/1; MG/1
1 TABLET, FILM COATED ORAL
Qty: 0 | Refills: 0 | Status: DISCONTINUED | OUTPATIENT
Start: 2019-02-09 | End: 2019-02-19

## 2019-02-09 RX ORDER — AMLODIPINE BESYLATE 2.5 MG/1
1 TABLET ORAL
Qty: 0 | Refills: 0 | COMMUNITY

## 2019-02-09 RX ORDER — CEFTRIAXONE 500 MG/1
1 INJECTION, POWDER, FOR SOLUTION INTRAMUSCULAR; INTRAVENOUS ONCE
Qty: 0 | Refills: 0 | Status: COMPLETED | OUTPATIENT
Start: 2019-02-09 | End: 2019-02-09

## 2019-02-09 RX ORDER — CALCITRIOL 0.5 UG/1
0.25 CAPSULE ORAL DAILY
Qty: 0 | Refills: 0 | Status: DISCONTINUED | OUTPATIENT
Start: 2019-02-10 | End: 2019-02-19

## 2019-02-09 RX ORDER — SODIUM CHLORIDE 9 MG/ML
1000 INJECTION, SOLUTION INTRAVENOUS
Qty: 0 | Refills: 0 | Status: DISCONTINUED | OUTPATIENT
Start: 2019-02-09 | End: 2019-02-19

## 2019-02-09 RX ORDER — SACCHAROMYCES BOULARDII 250 MG
250 POWDER IN PACKET (EA) ORAL
Qty: 0 | Refills: 0 | Status: DISCONTINUED | OUTPATIENT
Start: 2019-02-09 | End: 2019-02-19

## 2019-02-09 RX ORDER — FUROSEMIDE 40 MG
20 TABLET ORAL ONCE
Qty: 0 | Refills: 0 | Status: COMPLETED | OUTPATIENT
Start: 2019-02-09 | End: 2019-02-09

## 2019-02-09 RX ADMIN — CEFTRIAXONE 100 GRAM(S): 500 INJECTION, POWDER, FOR SOLUTION INTRAMUSCULAR; INTRAVENOUS at 13:33

## 2019-02-09 RX ADMIN — Medication 20 MILLIGRAM(S): at 22:11

## 2019-02-09 RX ADMIN — CEFTRIAXONE 1 GRAM(S): 500 INJECTION, POWDER, FOR SOLUTION INTRAMUSCULAR; INTRAVENOUS at 00:00

## 2019-02-09 RX ADMIN — SENNA PLUS 2 TABLET(S): 8.6 TABLET ORAL at 22:12

## 2019-02-09 RX ADMIN — AZITHROMYCIN 500 MILLIGRAM(S): 500 TABLET, FILM COATED ORAL at 16:06

## 2019-02-09 RX ADMIN — Medication 1: at 22:12

## 2019-02-09 RX ADMIN — HEPARIN SODIUM 5000 UNIT(S): 5000 INJECTION INTRAVENOUS; SUBCUTANEOUS at 22:11

## 2019-02-09 RX ADMIN — AZITHROMYCIN 255 MILLIGRAM(S): 500 TABLET, FILM COATED ORAL at 15:06

## 2019-02-09 NOTE — CONSULT NOTE ADULT - PROBLEM SELECTOR RECOMMENDATION 9
complicated by loculations; small to moderate in size   as above   if intervention warranted most likely will need IR guided pigtail   complicated by ESRD on HD and acute on chronic heart failure   will continue to follow   consult appreciated

## 2019-02-09 NOTE — ED ADULT NURSE NOTE - LANGUAGE ASSISTANCE NEEDED
all other ROS negative except as per HPI Yes-Patient/Caregiver accepts free interpretation services...

## 2019-02-09 NOTE — CONSULT NOTE ADULT - SUBJECTIVE AND OBJECTIVE BOX
SUBJECTIVE      HPI 58yFemale known to service with previous chest tube in the right; with history of CAD, Chronic systolic heart failure, HTN, HLD, anemia, PAF, pleural effusion with thoracentesis, ESRD on HD through A/ V fistula now presenting from her pulmonologist office after outpatient CXR showed increasing infiltrate vs effusion on right side; patient on 02 at baseline with recent admission for flu and effusion s/p pigtail catheter drainage; admits shortness of breath and espinosa at baseline with 2 months of cough with sputum production.  admits feeling feverish with no known temperature; insidious onset of malaise and decrease in activity   ultimately found to have small to moderate loculated  right sided pleural effusion       LAST BLOOD THINNER-->   heparin  Injectable   5000 Unit(s) SubCutaneous (02-09-19 @ 22:11)        SOCIAL HISTORY   lives at home with her adult children   uses home 02  denies smoking     PAST MEDICAL / SURGICAL HISTORY   PAST MEDICAL & SURGICAL HISTORY:  Coronary artery disease, angina presence unspecified, unspecified vessel or lesion type, unspecified whether native or transplanted heart  Paroxysmal atrial fibrillation  Anemia due to chronic kidney disease, unspecified CKD stage  Chronic systolic congestive heart failure  Pleural effusion  Pericardial effusion  End stage renal disease on dialysis  HLD (hyperlipidemia)  HTN (hypertension)  DM (diabetes mellitus)  A-V fistula  Encounter for dialysis catheter care    HOME MEDICATIONS  Home Medications:  amLODIPine 2.5 mg oral tablet: 1 tab(s) orally once a day (09 Feb 2019 20:55)  calcitriol 0.25 mcg oral capsule: 1 cap(s) orally once a day (09 Feb 2019 20:55)  calcium carbonate 1250 mg (500 mg elemental calcium) oral tablet: 1 tab(s) orally 3 times a day (09 Feb 2019 20:55)  Entresto 49 mg-51 mg oral tablet: 1 tab(s) orally once a day (09 Feb 2019 20:55)  insulin glargine: 4 unit(s) subcutaneous once a day (at bedtime) (09 Feb 2019 20:55)  MiraLax oral powder for reconstitution: 20 milligram(s) orally once a day (09 Feb 2019 20:55)  pantoprazole 40 mg oral delayed release tablet: 1 tab(s) orally once a day (09 Feb 2019 20:55)      ALLERGIES  Allergies    No Known Allergies    Intolerances        OBJECTIVE DATA    VITALS   currently in sinus rhythm  ICU Vital Signs Last 24 Hrs  T(C): 36.4 (09 Feb 2019 23:12), Max: 36.9 (09 Feb 2019 19:57)  T(F): 97.6 (09 Feb 2019 23:12), Max: 98.4 (09 Feb 2019 19:57)  HR: 105 (09 Feb 2019 23:12) (91 - 105)  BP: 148/88 (09 Feb 2019 23:12) (148/88 - 162/100)  BP(mean): --  ABP: --  ABP(mean): --  RR: 22 (09 Feb 2019 23:12) (18 - 28)  SpO2: 95% (09 Feb 2019 19:57) (91% - 97%)      LABS                         11.1   4.3   )-----------( 219      ( 09 Feb 2019 13:03 )             38.5   PT/INR - ( 09 Feb 2019 13:03 )   PT: 13.7 sec;   INR: 1.19 ratio         PTT - ( 09 Feb 2019 13:03 )  PTT:31.3 [id number]    136  |  96<L>  |  23.0<H>  ----------------------------<  182<H>  5.4<H>   |  27.0  |  3.69<H>    Ca    9.1      09 Feb 2019 15:12    TPro  7.4  /  Alb  3.1<L>  /  TBili  0.5  /  DBili  x   /  AST  14  /  ALT  5   /  AlkPhos  140<H>  02-09    Serum Pro-Brain Natriuretic Peptide: >97524 pg/mL (02-09-19 @ 15:12)      INTAKE/OUTAKE  I&O's Summary      IMAGING   personally reviewed imaging   Xray Chest 1 View- PORTABLE-Urgent:    EXAM:  XR CHEST PORTABLE URGENT 1V                          PROCEDURE DATE:  02/09/2019          INTERPRETATION:  Clinical Information: Shortness of breath.    Technique: AP chest image.     Comparison: 01/05/2019    Impression: Cardiomegaly. Bilateral patchy opacities are significantly   changed compared to prior, representing multifocal pneumonia or pulmonary   edema.                LUIS ARMANDO CARRERA M.D., ATTENDING RADIOLOGIST  This document has been electronically signed. Feb 9 2019 11:46AM               (02-09-19 @ 11:14)      CT SCAN   < from: CT Abdomen and Pelvis No Cont (02.09.19 @ 11:54) >  Findings:    Chest:    Airways, pleura, lungs: Central airways patent. Trace left pleural   effusion. Small-moderate loculated right pleural effusion. And perihilar   consolidation of unclear etiology. Interlobular septal thickening on the   right of unclear etiology.  Vasculature: Unremarkable.  Mediastinum and vishal: Severe cardiomegaly. Pericardium and esophagus   unremarkable.  Chest wall and imaged neck: Unremarkable.  Neuromusculoskeletal: Unremarkable.    Abdomen/pelvis:     Hepatobiliary: The liver, gallbladder, and biliary tree are unremarkable  Spleen: Unremarkable  Pancreas: Unremarkable  Adrenal glands: Unremarkable  Urinary: The kidneys, ureters, and urinary bladder are unremarkable  Reproductive: The uterus and adnexa are unremarkable.  Gastrointestinal: Very limited evaluation of the bowel due to lack of   intravenous contrast, oral contrast, and presence of ascites which   obscures the fat/bowel interface. No bowel obstruction.  Peritoneum: Small-moderate ascites. No free air.  Vasculature: Unremarkable  Retroperitoneum: Unremarkable  Abdominal wall: Unremarkable  Neuromusculoskeletal: Unremarkable    Impression:   -Right perihilar consolidation and right interlobular septal thickening;   differential diagnosis includes malignancy and pneumonia.  -Small-moderate right pleural effusion is loculated, differential   diagnosis includes malignancy, parapneumonic effusion, or empyema.  -Severe global cardiomegaly.  -Small-moderate ascites, unclear etiology.        < end of copied text > SUBJECTIVE  "I went for a routing visit"  pacific  used [id number]     HPI 58yFemale known to service with previous chest tube in the right; with history of CAD, Chronic systolic heart failure, HTN, HLD, anemia, PAF, pleural effusion with thoracentesis, ESRD on HD through A/ V fistula now presenting from her pulmonologist office after outpatient CXR showed increasing infiltrate vs effusion on right side; patient on 02 at baseline with recent admission for flu and effusion s/p pigtail catheter drainage; admits shortness of breath and espinosa at baseline with 2 months of cough with sputum production.  admits feeling feverish with no known temperature; insidious onset of malaise and decrease in activity   ultimately found to have small to moderate loculated  right sided pleural effusion       LAST BLOOD THINNER-->   heparin  Injectable   5000 Unit(s) SubCutaneous (02-09-19 @ 22:11)        SOCIAL HISTORY   lives at home with her adult children   uses home 02  denies smoking     PAST MEDICAL / SURGICAL HISTORY   PAST MEDICAL & SURGICAL HISTORY:  Coronary artery disease, angina presence unspecified, unspecified vessel or lesion type, unspecified whether native or transplanted heart  Paroxysmal atrial fibrillation  Anemia due to chronic kidney disease, unspecified CKD stage  Chronic systolic congestive heart failure  Pleural effusion  Pericardial effusion  End stage renal disease on dialysis  HLD (hyperlipidemia)  HTN (hypertension)  DM (diabetes mellitus)  A-V fistula  Encounter for dialysis catheter care    HOME MEDICATIONS  Home Medications:  amLODIPine 2.5 mg oral tablet: 1 tab(s) orally once a day (09 Feb 2019 20:55)  calcitriol 0.25 mcg oral capsule: 1 cap(s) orally once a day (09 Feb 2019 20:55)  calcium carbonate 1250 mg (500 mg elemental calcium) oral tablet: 1 tab(s) orally 3 times a day (09 Feb 2019 20:55)  Entresto 49 mg-51 mg oral tablet: 1 tab(s) orally once a day (09 Feb 2019 20:55)  insulin glargine: 4 unit(s) subcutaneous once a day (at bedtime) (09 Feb 2019 20:55)  MiraLax oral powder for reconstitution: 20 milligram(s) orally once a day (09 Feb 2019 20:55)  pantoprazole 40 mg oral delayed release tablet: 1 tab(s) orally once a day (09 Feb 2019 20:55)      ALLERGIES  Allergies    No Known Allergies    Intolerances        OBJECTIVE DATA    VITALS   currently in sinus rhythm  ICU Vital Signs Last 24 Hrs  T(C): 36.4 (09 Feb 2019 23:12), Max: 36.9 (09 Feb 2019 19:57)  T(F): 97.6 (09 Feb 2019 23:12), Max: 98.4 (09 Feb 2019 19:57)  HR: 105 (09 Feb 2019 23:12) (91 - 105)  BP: 148/88 (09 Feb 2019 23:12) (148/88 - 162/100)  BP(mean): --  ABP: --  ABP(mean): --  RR: 22 (09 Feb 2019 23:12) (18 - 28)  SpO2: 95% (09 Feb 2019 19:57) (91% - 97%)      LABS                         11.1   4.3   )-----------( 219      ( 09 Feb 2019 13:03 )             38.5   PT/INR - ( 09 Feb 2019 13:03 )   PT: 13.7 sec;   INR: 1.19 ratio         PTT - ( 09 Feb 2019 13:03 )  PTT:31.3 [id number]    136  |  96<L>  |  23.0<H>  ----------------------------<  182<H>  5.4<H>   |  27.0  |  3.69<H>    Ca    9.1      09 Feb 2019 15:12    TPro  7.4  /  Alb  3.1<L>  /  TBili  0.5  /  DBili  x   /  AST  14  /  ALT  5   /  AlkPhos  140<H>  02-09    Serum Pro-Brain Natriuretic Peptide: >56664 pg/mL (02-09-19 @ 15:12)      INTAKE/OUTAKE  I&O's Summary      IMAGING   personally reviewed imaging   Xray Chest 1 View- PORTABLE-Urgent:    EXAM:  XR CHEST PORTABLE URGENT 1V                          PROCEDURE DATE:  02/09/2019          INTERPRETATION:  Clinical Information: Shortness of breath.    Technique: AP chest image.     Comparison: 01/05/2019    Impression: Cardiomegaly. Bilateral patchy opacities are significantly   changed compared to prior, representing multifocal pneumonia or pulmonary   edema.                LUIS ARMANDO CARRERA M.D., ATTENDING RADIOLOGIST  This document has been electronically signed. Feb 9 2019 11:46AM               (02-09-19 @ 11:14)      CT SCAN   < from: CT Abdomen and Pelvis No Cont (02.09.19 @ 11:54) >  Findings:    Chest:    Airways, pleura, lungs: Central airways patent. Trace left pleural   effusion. Small-moderate loculated right pleural effusion. And perihilar   consolidation of unclear etiology. Interlobular septal thickening on the   right of unclear etiology.  Vasculature: Unremarkable.  Mediastinum and vishal: Severe cardiomegaly. Pericardium and esophagus   unremarkable.  Chest wall and imaged neck: Unremarkable.  Neuromusculoskeletal: Unremarkable.    Abdomen/pelvis:     Hepatobiliary: The liver, gallbladder, and biliary tree are unremarkable  Spleen: Unremarkable  Pancreas: Unremarkable  Adrenal glands: Unremarkable  Urinary: The kidneys, ureters, and urinary bladder are unremarkable  Reproductive: The uterus and adnexa are unremarkable.  Gastrointestinal: Very limited evaluation of the bowel due to lack of   intravenous contrast, oral contrast, and presence of ascites which   obscures the fat/bowel interface. No bowel obstruction.  Peritoneum: Small-moderate ascites. No free air.  Vasculature: Unremarkable  Retroperitoneum: Unremarkable  Abdominal wall: Unremarkable  Neuromusculoskeletal: Unremarkable    Impression:   -Right perihilar consolidation and right interlobular septal thickening;   differential diagnosis includes malignancy and pneumonia.  -Small-moderate right pleural effusion is loculated, differential   diagnosis includes malignancy, parapneumonic effusion, or empyema.  -Severe global cardiomegaly.  -Small-moderate ascites, unclear etiology.        < end of copied text >

## 2019-02-09 NOTE — H&P ADULT - HISTORY OF PRESENT ILLNESS
59 y/o female with PMH of ESRD on HD M-W-F, DM, PAF, NICM / CHF with recent hospitalization in [facility], s/p thoracocentesis and sent home with supplemental O2 3L brought in from home by her son because she was ordered a CXR by her pulmonologist, Dr Lemus and then she was told to come to the . Patient has a baseline cough for 2 months, with whitish sputum, unchanged recently. She mentions baseline SOB and HAYES but not recently increased. She sleeps with 2-3 pillows. She mentions subjective fevers, weakness. She c/o constipation, generalized pain and leg swelling which is decreasing. Denies hemoptysis, mentions nausea and vomit only after she takes some of her pills but she is otherwise tolerating PO and with good apatite. Denies sick contacts, recent travels, dysuria, she still makes small amounts of urine.     In the ED chest and abdomen CT reported small pleural effusions, and right lower consolidations  ascites , EKG reported no ST or T-wave acute abnormalities. 57 y/o female with PMH of ESRD on HD M-W-F, DM, PAF, NICM / CHF with recent hospitalization in [facility] with + flu and right pleural effusion s/p thoracocentesis and sent home with supplemental O2 3L brought in from home by her son because she was ordered a CXR by her pulmonologist, Dr Lemus and then she was told to come to the . Patient has a baseline cough for 2 months, with whitish sputum, unchanged recently. She mentions baseline SOB and HAYES but not recently increased. She sleeps with 2-3 pillows. She mentions subjective fevers, weakness. She c/o constipation, generalized pain and leg swelling which is decreasing. Denies hemoptysis, mentions nausea and vomit only after she takes some of her pills but she is otherwise tolerating PO and with good apatite. Denies sick contacts, recent travels, dysuria, she still makes small amounts of urine.     In the ED chest and abdomen CT reported small pleural effusions, and right lower consolidations  ascites , EKG reported no ST or T-wave acute abnormalities. 59 y/o female with PMH of ESRD on HD M-W-F, DM, PAF, NICM / CHF with recent hospitalization in [facility] with + flu and right pleural effusion s/p thoracocentesis and sent home with supplemental O2 3L brought in from home by her son because she was ordered a CXR by her pulmonologist, Dr Lemus and then she was told to come to the ED. Patient has a baseline cough for 2 months, with whitish sputum, unchanged recently. She mentions baseline SOB and HAYES but not recently increased. She sleeps with 2-3 pillows. She mentions subjective fevers, weakness. She c/o constipation, generalized pain and leg swelling which is decreasing. Denies hemoptysis, mentions nausea and vomit only after she takes some of her pills but she is otherwise tolerating PO and with good apatite. Denies sick contacts, recent travels, dysuria, she still makes small amounts of urine.     In the ED chest and abdomen CT reported small pleural effusions, and right lower consolidations  ascites , EKG reported no ST or T-wave acute abnormalities.

## 2019-02-09 NOTE — H&P ADULT - ATTENDING COMMENTS
Agree with the above.    Will keep on abx and consult pulm and CT surgery and nephrology. Agree with the above.  Will keep on abx and consult pulm and CT surgery and nephrology.  Hyperkalemia: repeat K, was hemolyzed.   Remainder of plan as stated above.

## 2019-02-09 NOTE — CONSULT NOTE ADULT - ASSESSMENT
58 F with history of chronic systolic heart failure, ESRD on HD, pleural effusion s.p pigtail catheter insertion last month now presenting from outpatient pulmonologist after CXR showed worsening infiltrate v effusion on right   s/p ct scan in EF significant for small to moderate loculated right sided effusion     -dr hui to see in am   -without acute distress/ need for emergent intervention   -loculated effusion noted to be small to moderate; if intervention warranted would recc IR guided pigtail vs thoracentesis   -admit to primary team for treatment of most likely PNA, cannot rule being complicated by acute on chronic systolic heart failure now with ascites on ct scan & BNP > 70,000   -would continue HD as per renal   -chest pt/ incentive spirometer and ambulation also encouraged

## 2019-02-09 NOTE — ED ADULT NURSE NOTE - OBJECTIVE STATEMENT
Assumed pt care at 1207  Via  pt sent by Dr. Velez for abnormal chest xray done three days ago. Pt c/o some mild diffuse abdominal pain and SOB.  She uses 3 liters of oxygen at home PRN. Last dialysis yesterday.  Left AV fistula ++ Assumed pt care at 1207  Via  pt sent by Dr. Velez for abnormal chest xray done three days ago. Pt c/o some mild diffuse abdominal pain and SOB.  She uses 3 liters of oxygen at home PRN. Last dialysis yesterday.  Left AV fistula ++. 1+ bilateral lower extremity edema.  Ascities

## 2019-02-09 NOTE — CONSULT NOTE ADULT - ASSESSMENT
ESD - HD MWF, shall arrange if pt still here  Lung lesion - Seen Pulm ; Need to f/u ?Abx vs other w/u

## 2019-02-09 NOTE — H&P ADULT - ASSESSMENT
57 y/o female with PMH of ESRD on HD M-W-F, DM, PAF, NICM / CHF with recent hospitalization in [facility] with + flu and right pleural effusion s/p thoracocentesis and sent home with supplemental O2 3L who now presents with recurrent right pleural effusion, SOB, HAYES and leukocitosis, Will admit for possible CAP and recurrent pleural effusions management      Admit to medicine residents service with Dr Lloyd/Dr Agee  Monitored bed with  59 y/o female with PMH of ESRD on HD M-W-F, DM, PAF, NICM / CHF with recent hospitalization in [facility] with + flu and right pleural effusion s/p thoracocentesis and sent home with supplemental O2 3L who now presents with recurrent right pleural effusion, SOB, HAYES and leukocytosis Will admit for possible CAP and recurrent pleural effusions management        Admit to medicine residents service with Dr Lloyd/Dr Agee  Monitored bed with   Diet: carb consistent with renal restrictions  Activity as tolerated      Dyspnea on excerption likely 2/2 loculated pleural effusion with possible pneumonia with possible CHF exacerbation  S/p Rocephin and azithro x 1 in the ED  Will c/w Rocephin 1g QD IV and Azithromycin IV 500mg IV Q8 hrs with florastor while on ABX  F/u Sputum Cx, UCx x 2 and deescalate ABX as needed  CURB 65: 2 (Bun and age): 6.8% 30 days mortality  Supplemental O2 as needed  Will consult CTS due to loculated pleural effusion  WIll consult pulmonology  Will give her 20 mg IV Lasix x 1    CHF with preserved EF  Last TTE: 11/2018: EF 55-55%, GI diastolic disfunction  BNP: > 70,000 but patient has been taking entresto and her BNP has been elevated since before may/2018    DM2  Last HbA1C:     HTN      E 59 y/o female with PMH of ESRD on HD M-W-F, DM, PAF, NICM / CHF with recent hospitalization in [facility] with + flu and right pleural effusion s/p thoracocentesis and sent home with supplemental O2 3L who now presents with recurrent right pleural effusion, SOB, HAYES and leukocytosis Will admit for possible CAP and recurrent pleural effusions management        Admit to medicine residents service with Dr Lloyd/Dr Agee  Monitored bed with   Diet: carb consistent with renal restrictions  Activity as tolerated      Dyspnea on excerption likely 2/2 loculated pleural effusion with possible pneumonia with possible CHF exacerbation  S/p Rocephin and azithro x 1 in the ED  Will c/w Rocephin 1g QD IV and Azithromycin IV 500mg IV Q24 hrs with florastor while on ABX  F/u Sputum Cx, UCx x 2 and deescalate ABX as needed  CURB 65: 1 (Bun): 2.7% 30 days mortality  Supplemental O2 as needed  Will consult CTS due to loculated pleural effusion  WIll consult pulmonology  Will give her 20 mg IV Lasix x 1    CHF with preserved EF  Last TTE: 11/2018: EF 55-55%, GI diastolic disfunction  BNP: > 70,000 but patient has been taking entresto and her BNP has been elevated since before may/2018  Will give 1 dose of lasix as noted above since patient has 2+ pitting edema    DM2  Last HbA1C: 6.2 on 01/01/2019  On ISS for now with hypoglicemia protocol  Unclear how much outpatient insuline patient uses    HTN  c/w home medications:  Coreg 12.5mg BID PO  Amlodipine 2.5 QD PO  Entresto 49/51 PO QD    ESRD  Last HD: yesterday.  HD as per nephrology. Outpatient schedule: M-W-F      Prophylactic measure:  Heparin 5000 UI SC Q 8hrs  Florastor while on Abx  Omeprazol 40mg PO 57 y/o female with PMH of ESRD on HD M-W-F, DM, PAF, NICM / CHF with recent hospitalization in [facility] with + flu and right pleural effusion s/p thoracocentesis and sent home with supplemental O2 3L who now presents with recurrent right pleural effusion, SOB, HAYES and leukocytosis Will admit for possible CAP and recurrent pleural effusions management        Admit to medicine residents service with Dr Lloyd/Dr Agee  Monitored bed with   Diet: carb consistent with renal restrictions  Activity as tolerated      Dyspnea on excerption likely 2/2 loculated pleural effusion with possible pneumonia with possible CHF exacerbation  S/p Rocephin and azithro x 1 in the ED  Will c/w Rocephin 1g QD IV and Azithromycin IV 500mg IV Q24 hrs with florastor while on ABX  F/u Sputum Cx, UCx x 2 and deescalate ABX as needed  CURB 65: 1 (Bun): 2.7% 30 days mortality  Supplemental O2 as needed  Will consult CTS due to loculated pleural effusion  WIll consult pulmonology  Will give her 20 mg IV Lasix x 1    CHF with preserved EF  Last TTE: 11/2018: EF 55-55%, GI diastolic disfunction  BNP: > 70,000 but patient has been taking entresto and her BNP has been elevated since before may/2018  Will give 1 dose of lasix as noted above since patient has 2+ pitting edema    DM2  Last HbA1C: 6.2 on 01/01/2019  On ISS for now with hypoglicemia protocol  Unclear how much outpatient insuline patient uses    HTN  c/w home medications:  Coreg 12.5mg BID PO  Amlodipine 2.5 QD PO  Entresto 49/51 PO QD    ESRD  Last HD: yesterday.  HD as per nephrology. Outpatient schedule: M-W-F      Prophylactic measure:  Heparin 5000 UI SC Q 8hrs  Florastor while on Abx  Omeprazol 40mg PO    CODE status: FULL CODE

## 2019-02-09 NOTE — H&P ADULT - NSHPREVIEWOFSYSTEMS_GEN_ALL_CORE
CONSTITUTIONAL: AS per HPI  EYES/ENT: No visual changes;  No vertigo or throat pain   NECK: No pain or stiffness  RESPIRATORY: As per HPI  CARDIOVASCULAR: No chest pain or palpitations  GASTROINTESTINAL: As per HPI  GENITOURINARY: No dysuria, frequency or hematuria  PSYCH: normal affect and interaction  EXTREMITIES: no extremities weakness or rashes, joint pain. C/o leg swelling  NEUROLOGICAL: No numbness or weakness  SKIN: No itching, burning, rashes, or lesions CONSTITUTIONAL: AS per HPI  EYES/ENT: No visual changes;  No vertigo or throat pain   NECK: No pain or stiffness  RESPIRATORY: As per HPI  CARDIOVASCULAR: No chest pain or palpitations  GASTROINTESTINAL: As per HPI, no melena or hematochezia  GENITOURINARY: No dysuria, frequency or hematuria  PSYCH: normal affect and interaction  EXTREMITIES: no extremities weakness or rashes, joint pain. C/o leg swelling  NEUROLOGICAL: No numbness or weakness  SKIN: No itching, burning, rashes, or lesions

## 2019-02-09 NOTE — ED STATDOCS - PROGRESS NOTE DETAILS
57 y/o F pt with PMHx of DM, HTN, HLD, ESRD (every MWF - loast dose Friday), anemia, chronic kidney disease, presents to ED c/o increased SOB and CP that worsened over the past few days. Pt received an X-ray today, which resulted in bilateral plural effusion, possibility of underlying neoplasm, large right sided infiltrate increase, fluid on both sides. Associated sx of abdominal distention. Pt sent in for abnormal chest X-ray. Pt reports CP increases with inspiration. She currently has chest pain. Pt was just recently in the hospital.  ED : Marcia  Pt will go to Main ED with Monitor for further evaluation

## 2019-02-09 NOTE — H&P ADULT - NSHPPHYSICALEXAM_GEN_ALL_CORE
Vital Signs Last 24 Hrs  T(C): 36.9 (09 Feb 2019 19:57), Max: 36.9 (09 Feb 2019 19:57)  T(F): 98.4 (09 Feb 2019 19:57), Max: 98.4 (09 Feb 2019 19:57)  HR: 105 (09 Feb 2019 19:57) (91 - 105)  BP: 151/88 (09 Feb 2019 19:57) (151/88 - 162/100)  RR: 24 (09 Feb 2019 19:57) (18 - 28)  SpO2: 95% (09 Feb 2019 19:57) (91% - 97%) on 3L via NC    Telemonitor: Sinus tach 105's    General: Elderly women Well nourished/Well developed, NAD  Head:  Normocephalic, atraumatic  ENT:  Mucosa moist, no ulcerations  Neck:  Supple, no sinuses or palpable masses  Lymphatic:  No palpable cervical, supraclavicular, axillary adenopathy  Respiratory: Right lower crackles. No wheezing, rest CTA B/L  CV: tachycardic, regular rhythm S1S2, no murmur  GI: Abdomen with generalized tenderness, no rebound tenderness, NT, ND no palpable mass  Extremities: + 2 pitting b/l leg edema, + peripheral pulses, FROM all 4 extremity. Left arm with A/V fistula noted, thrill palpated, non tender  Neurology: A&Ox3, no focalization signs Vital Signs Last 24 Hrs  T(C): 36.9 (09 Feb 2019 19:57), Max: 36.9 (09 Feb 2019 19:57)  T(F): 98.4 (09 Feb 2019 19:57), Max: 98.4 (09 Feb 2019 19:57)  HR: 105 (09 Feb 2019 19:57) (91 - 105)  BP: 151/88 (09 Feb 2019 19:57) (151/88 - 162/100)  RR: 24 (09 Feb 2019 19:57) (18 - 28)  SpO2: 95% (09 Feb 2019 19:57) (91% - 97%) on 3L via NC    Telemonitor: Sinus tach 105 then NSR    General: Elderly women Well nourished/Well developed, NAD  Head:  Normocephalic, atraumatic  ENT:  Mucosa moist, no ulcerations  Neck:  Supple, no sinuses or palpable masses  Lymphatic:  No palpable cervical, supraclavicular, axillary adenopathy  Respiratory: Right lower crackles. No wheezing, rest CTA B/L  CV: tachycardic, regular rhythm S1S2, no murmur  GI: Abdomen with generalized tenderness, no rebound tenderness, NT, ND no palpable mass  Extremities: + 2 pitting b/l leg edema, + peripheral pulses, FROM all 4 extremity. Left arm with A/V fistula noted, thrill palpated, non tender  Neurology: A&Ox3, no focalization signs

## 2019-02-09 NOTE — H&P ADULT - NSHPSOCIALHISTORY_GEN_ALL_CORE
Denies EtOH, tobacco and illicit drug use.  Doesn't work  Lives with her kids at home Denies EtOH, tobacco and illicit drug use.  Doesn't work  Lives with her kids at home  Patient denies family history on both parents Denies EtOH, tobacco and illicit drug use.  Doesn't work  Lives with her kids at home  Patient denies family history of lung disease in both parents

## 2019-02-09 NOTE — ED ADULT NURSE NOTE - INTERVENTIONS DEFINITIONS
Provide visual clues: red socks/Stretcher in lowest position, wheels locked, appropriate side rails in place/[place] to call system/Instruct patient to call for assistance

## 2019-02-09 NOTE — ED PROVIDER NOTE - OBJECTIVE STATEMENT
59yo pmh chf, esrd (MWF) with last dialysis yesterday comes to ed with chest xray noted rt pleural effusion ; pt noted has seen pulmonary for  work up of fluid in the lungs; pt has home oxygen

## 2019-02-09 NOTE — H&P ADULT - NSHPLABSRESULTS_GEN_ALL_CORE
11.1   4.3   )-----------( 219      ( 09 Feb 2019 13:03 )             38.5     02-09  136  |  96<L>  |  23.0<H>  ----------------------------<  182<H>  5.4<H>   |  27.0  |  3.69<H>    Ca    9.1      09 Feb 2019 15:12    TPro  7.4  /  Alb  3.1<L>  /  TBili  0.5  /  DBili  x   /  AST  14  /  ALT  5   /  AlkPhos  140<H>  02-09    PT/INR - ( 09 Feb 2019 13:03 )   PT: 13.7 sec;   INR: 1.19 ratio       PTT - ( 09 Feb 2019 13:03 )  PTT:31.3 sec    < from: CT Abdomen and Pelvis No Cont (02.09.19 @ 11:54) >       EXAM:  CT ABDOMEN AND PELVIS                         EXAM:  CT CHEST                        PROCEDURE DATE:  02/09/2019    INTERPRETATION:  Clinical Information: Dyspnea  Comparison: 12/31/2018  Procedure:  CT of the Chest, Abdomen andPelvis was performed without intravenous contrast.   Intravenous contrast: None.  Oral contrast: None.  Sagittal and coronal reformats were performed.  Findings:  Chest:  Airways, pleura, lungs: Central airways patent. Trace left pleural effusion. Small-moderate loculated right pleural effusion. And perihilar consolidation of unclear etiology. Interlobular septal thickening on the right of unclear etiology.  Vasculature: Unremarkable.  Mediastinum and vishal: Severe cardiomegaly. Pericardium and esophagus unremarkable.  Chest wall and imaged neck: Unremarkable.  Neuromusculoskeletal: Unremarkable.  Abdomen/pelvis:   Hepatobiliary: The liver, gallbladder, and biliary tree are unremarkable  Spleen: Unremarkable  Pancreas: Unremarkable  Adrenal glands: Unremarkable  Urinary: The kidneys, ureters, and urinary bladder are unremarkable  Reproductive: The uterus and adnexa are unremarkable.  Gastrointestinal: Very limited evaluation of the bowel due to lack of intravenous contrast, oral contrast, and presence of ascites which obscures the fat/bowel interface. No bowel obstruction.  Peritoneum: Small-moderate ascites. No free air. Vasculature: Unremarkable  Retroperitoneum: Unremarkable  Abdominal wall: Unremarkable  Neuromusculoskeletal: Unremarkable  Impression:   -Right perihilar consolidation and right interlobular septal thickening; differential diagnosis includes malignancy and pneumonia.  -Small-moderate right pleural effusion is loculated, differential diagnosis includes malignancy, parapneumonic effusion, or empyema.  -Severe global cardiomegaly.  -Small-moderate ascites, unclear etiology.  LUIS ARMANDO CARRERA M.D., ATTENDING RADIOLOGIST  This document has been electronically signed. Feb 9 2019  1:11PM       < from: Xray Chest 1 View- PORTABLE-Urgent (02.09.19 @ 11:14) >   EXAM:  XR CHEST PORTABLE URGENT 1V                        PROCEDURE DATE:  02/09/2019    INTERPRETATION:  Clinical Information: Shortness of breath.  Technique: AP chest image.   Comparison: 01/05/2019  Impression: Cardiomegaly. Bilateral patchy opacities are significantly changed compared to prior, representing multifocal pneumonia or pulmonary edema.  LUIS ARMANDO CARRERA M.D., ATTENDING RADIOLOGIST  This document has been electronically signed. Feb 9 2019 11:46AM

## 2019-02-09 NOTE — CONSULT NOTE ADULT - SUBJECTIVE AND OBJECTIVE BOX
Patient is a 58y old  Female who presents with a chief complaint of sent from pulmonology office (09 Feb 2019 20:23)  \Feels SOB, on O2  Found to have some lesion, details NA - d/w Dr. Love    HPI:  59 y/o female with PMH of ESRD on HD M-W-F, DM, PAF, NICM / CHF with recent hospitalization in [facility] with + flu and right pleural effusion s/p thoracocentesis and sent home with supplemental O2 3L brought in from home by her son because she was ordered a CXR by her pulmonologist, Dr Lemus and then she was told to come to the ED. Patient has a baseline cough for 2 months, with whitish sputum, unchanged recently. She mentions baseline SOB and HAYES but not recently increased. She sleeps with 2-3 pillows. She mentions subjective fevers, weakness. She c/o constipation, generalized pain and leg swelling which is decreasing. Denies hemoptysis, mentions nausea and vomit only after she takes some of her pills but she is otherwise tolerating PO and with good apatite. Denies sick contacts, recent travels, dysuria, she still makes small amounts of urine.     In the ED chest and abdomen CT reported small pleural effusions, and right lower consolidations  ascites , EKG reported no ST or T-wave acute abnormalities. (09 Feb 2019 20:23)      PAST MEDICAL & SURGICAL HISTORY:  Coronary artery disease, angina presence unspecified, unspecified vessel or lesion type, unspecified whether native or transplanted heart  Paroxysmal atrial fibrillation  Anemia due to chronic kidney disease, unspecified CKD stage  Chronic systolic congestive heart failure  Pleural effusion  Pericardial effusion  End stage renal disease on dialysis  HLD (hyperlipidemia)  HTN (hypertension)  DM (diabetes mellitus)  A-V fistula  Encounter for dialysis catheter care      FAMILY HISTORY:  Family history of kidney disease in brother (Sibling)      Social History:   -smoke   - Alcohol   -    Drugs        REVIEW OF SYSTEMS:  CONSTITUTIONAL: No fever, weight loss, or fatigue  EYES: No eye pain, No visual disturbances,   RESPIRATORY: No cough, hemoptysis; + SOB  CARDIOVASCULAR: No chest pain, No palpitations,   +leg swelling  GASTROINTESTINAL: No abdominal , NVD or GIB  GENITOURINARY: No UTI sx/hematuria  NEUROLOGICAL: No HA/wk/numbness  MUSCULOSKELETAL: No joint pain, LE swelling      Vital Signs Last 24 Hrs  T(C): 36.9 (09 Feb 2019 19:57), Max: 36.9 (09 Feb 2019 19:57)  T(F): 98.4 (09 Feb 2019 19:57), Max: 98.4 (09 Feb 2019 19:57)  HR: 105 (09 Feb 2019 19:57) (91 - 105)  BP: 151/88 (09 Feb 2019 19:57) (151/88 - 162/100)  BP(mean): --  RR: 24 (09 Feb 2019 19:57) (18 - 28)  SpO2: 95% (09 Feb 2019 19:57) (91% - 97%)    PHYSICAL EXAM:    GENERAL: NAD on O2 but dyspnic when tries to talk  EYES:  conjunctiva and sclera clear  NECK: Supple, No JVD/Carotid Bruit -ve   NERVOUS SYSTEM:  A/O x3,   Lungs : No rales, No rhonchi,   HEART:  No murmur,  No rub, No gallops  ABDOMEN: Soft, NT/ND BS+  EXTREMITIES:  + Peripheral Pulses, + edema  SKIN: No rashes or lesions      LABS:                        11.1   4.3   )-----------( 219      ( 09 Feb 2019 13:03 )             38.5     02-09    136  |  96<L>  |  23.0<H>  ----------------------------<  182<H>  5.4<H>   |  27.0  |  3.69<H>    Ca    9.1      09 Feb 2019 15:12    TPro  7.4  /  Alb  3.1<L>  /  TBili  0.5  /  DBili  x   /  AST  14  /  ALT  5   /  AlkPhos  140<H>  02-09    PT/INR - ( 09 Feb 2019 13:03 )   PT: 13.7 sec;   INR: 1.19 ratio         PTT - ( 09 Feb 2019 13:03 )  PTT:31.3 sec    CT Chest -NC noted ; Has effusion and infiltrate    RADIOLOGY & ADDITIONAL TESTS:    MEDICATIONS  (STANDING):  aspirin enteric coated  carvedilol  dextrose 40% Gel PRN  dextrose 5%.  dextrose 50% Injectable  dextrose 50% Injectable  dextrose 50% Injectable  docusate sodium  glucagon  Injectable PRN  heparin  Injectable  insulin lispro (HumaLOG) corrective regimen sliding scale  saccharomyces boulardii  sacubitril 49 mG/valsartan 51 mG  senna

## 2019-02-10 ENCOUNTER — TRANSCRIPTION ENCOUNTER (OUTPATIENT)
Age: 59
End: 2019-02-10

## 2019-02-10 DIAGNOSIS — J18.9 PNEUMONIA, UNSPECIFIED ORGANISM: ICD-10-CM

## 2019-02-10 DIAGNOSIS — I50.22 CHRONIC SYSTOLIC (CONGESTIVE) HEART FAILURE: ICD-10-CM

## 2019-02-10 DIAGNOSIS — J96.11 CHRONIC RESPIRATORY FAILURE WITH HYPOXIA: ICD-10-CM

## 2019-02-10 DIAGNOSIS — N18.6 END STAGE RENAL DISEASE: ICD-10-CM

## 2019-02-10 LAB
ANION GAP SERPL CALC-SCNC: 15 MMOL/L — SIGNIFICANT CHANGE UP (ref 5–17)
BASOPHILS # BLD AUTO: 0 K/UL — SIGNIFICANT CHANGE UP (ref 0–0.2)
BASOPHILS NFR BLD AUTO: 0.2 % — SIGNIFICANT CHANGE UP (ref 0–2)
BUN SERPL-MCNC: 26 MG/DL — HIGH (ref 8–20)
CALCIUM SERPL-MCNC: 8.9 MG/DL — SIGNIFICANT CHANGE UP (ref 8.6–10.2)
CHLORIDE SERPL-SCNC: 95 MMOL/L — LOW (ref 98–107)
CO2 SERPL-SCNC: 25 MMOL/L — SIGNIFICANT CHANGE UP (ref 22–29)
CREAT SERPL-MCNC: 4.46 MG/DL — HIGH (ref 0.5–1.3)
EOSINOPHIL # BLD AUTO: 0.1 K/UL — SIGNIFICANT CHANGE UP (ref 0–0.5)
EOSINOPHIL NFR BLD AUTO: 3 % — SIGNIFICANT CHANGE UP (ref 0–6)
GLUCOSE BLDC GLUCOMTR-MCNC: 119 MG/DL — HIGH (ref 70–99)
GLUCOSE BLDC GLUCOMTR-MCNC: 119 MG/DL — HIGH (ref 70–99)
GLUCOSE BLDC GLUCOMTR-MCNC: 169 MG/DL — HIGH (ref 70–99)
GLUCOSE BLDC GLUCOMTR-MCNC: 208 MG/DL — HIGH (ref 70–99)
GLUCOSE BLDC GLUCOMTR-MCNC: 54 MG/DL — LOW (ref 70–99)
GLUCOSE BLDC GLUCOMTR-MCNC: 55 MG/DL — LOW (ref 70–99)
GLUCOSE SERPL-MCNC: 170 MG/DL — HIGH (ref 70–115)
HCT VFR BLD CALC: 37.1 % — SIGNIFICANT CHANGE UP (ref 37–47)
HGB BLD-MCNC: 10.7 G/DL — LOW (ref 12–16)
LYMPHOCYTES # BLD AUTO: 0.7 K/UL — LOW (ref 1–4.8)
LYMPHOCYTES # BLD AUTO: 16.2 % — LOW (ref 20–55)
MAGNESIUM SERPL-MCNC: 2.3 MG/DL — SIGNIFICANT CHANGE UP (ref 1.6–2.6)
MCHC RBC-ENTMCNC: 27 PG — SIGNIFICANT CHANGE UP (ref 27–31)
MCHC RBC-ENTMCNC: 28.8 G/DL — LOW (ref 32–36)
MCV RBC AUTO: 93.7 FL — SIGNIFICANT CHANGE UP (ref 81–99)
MONOCYTES # BLD AUTO: 0.4 K/UL — SIGNIFICANT CHANGE UP (ref 0–0.8)
MONOCYTES NFR BLD AUTO: 8.9 % — SIGNIFICANT CHANGE UP (ref 3–10)
NEUTROPHILS # BLD AUTO: 3.1 K/UL — SIGNIFICANT CHANGE UP (ref 1.8–8)
NEUTROPHILS NFR BLD AUTO: 71.7 % — SIGNIFICANT CHANGE UP (ref 37–73)
PHOSPHATE SERPL-MCNC: 4.1 MG/DL — SIGNIFICANT CHANGE UP (ref 2.4–4.7)
PLATELET # BLD AUTO: 195 K/UL — SIGNIFICANT CHANGE UP (ref 150–400)
POTASSIUM SERPL-MCNC: 5.6 MMOL/L — HIGH (ref 3.5–5.3)
POTASSIUM SERPL-SCNC: 5.6 MMOL/L — HIGH (ref 3.5–5.3)
RAPID RVP RESULT: SIGNIFICANT CHANGE UP
RBC # BLD: 3.96 M/UL — LOW (ref 4.4–5.2)
RBC # FLD: 16.9 % — HIGH (ref 11–15.6)
SODIUM SERPL-SCNC: 135 MMOL/L — SIGNIFICANT CHANGE UP (ref 135–145)
WBC # BLD: 4.3 K/UL — LOW (ref 4.8–10.8)
WBC # FLD AUTO: 4.3 K/UL — LOW (ref 4.8–10.8)

## 2019-02-10 PROCEDURE — 99233 SBSQ HOSP IP/OBS HIGH 50: CPT | Mod: GC

## 2019-02-10 PROCEDURE — 99223 1ST HOSP IP/OBS HIGH 75: CPT

## 2019-02-10 PROCEDURE — 99222 1ST HOSP IP/OBS MODERATE 55: CPT

## 2019-02-10 RX ORDER — SODIUM POLYSTYRENE SULFONATE 4.1 MEQ/G
30 POWDER, FOR SUSPENSION ORAL ONCE
Qty: 0 | Refills: 0 | Status: COMPLETED | OUTPATIENT
Start: 2019-02-10 | End: 2019-02-10

## 2019-02-10 RX ORDER — ONDANSETRON 8 MG/1
4 TABLET, FILM COATED ORAL ONCE
Qty: 0 | Refills: 0 | Status: COMPLETED | OUTPATIENT
Start: 2019-02-10 | End: 2019-02-10

## 2019-02-10 RX ORDER — FUROSEMIDE 40 MG
80 TABLET ORAL ONCE
Qty: 0 | Refills: 0 | Status: COMPLETED | OUTPATIENT
Start: 2019-02-10 | End: 2019-02-10

## 2019-02-10 RX ORDER — LIDOCAINE 4 G/100G
1 CREAM TOPICAL DAILY
Qty: 0 | Refills: 0 | Status: DISCONTINUED | OUTPATIENT
Start: 2019-02-10 | End: 2019-02-19

## 2019-02-10 RX ORDER — INSULIN HUMAN 100 [IU]/ML
10 INJECTION, SOLUTION SUBCUTANEOUS ONCE
Qty: 0 | Refills: 0 | Status: COMPLETED | OUTPATIENT
Start: 2019-02-10 | End: 2019-02-10

## 2019-02-10 RX ORDER — VANCOMYCIN HCL 1 G
1000 VIAL (EA) INTRAVENOUS
Qty: 0 | Refills: 0 | Status: DISCONTINUED | OUTPATIENT
Start: 2019-02-10 | End: 2019-02-12

## 2019-02-10 RX ORDER — CEFEPIME 1 G/1
2000 INJECTION, POWDER, FOR SOLUTION INTRAMUSCULAR; INTRAVENOUS DAILY
Qty: 0 | Refills: 0 | Status: DISCONTINUED | OUTPATIENT
Start: 2019-02-10 | End: 2019-02-18

## 2019-02-10 RX ORDER — DEXTROSE 50 % IN WATER 50 %
50 SYRINGE (ML) INTRAVENOUS ONCE
Qty: 0 | Refills: 0 | Status: COMPLETED | OUTPATIENT
Start: 2019-02-10 | End: 2019-02-10

## 2019-02-10 RX ORDER — DEXTROSE 50 % IN WATER 50 %
15 SYRINGE (ML) INTRAVENOUS ONCE
Qty: 0 | Refills: 0 | Status: COMPLETED | OUTPATIENT
Start: 2019-02-10 | End: 2019-02-10

## 2019-02-10 RX ADMIN — PANTOPRAZOLE SODIUM 40 MILLIGRAM(S): 20 TABLET, DELAYED RELEASE ORAL at 05:20

## 2019-02-10 RX ADMIN — Medication 250 MILLIGRAM(S): at 17:12

## 2019-02-10 RX ADMIN — Medication 80 MILLIGRAM(S): at 12:14

## 2019-02-10 RX ADMIN — CEFEPIME 100 MILLIGRAM(S): 1 INJECTION, POWDER, FOR SOLUTION INTRAMUSCULAR; INTRAVENOUS at 14:20

## 2019-02-10 RX ADMIN — CARVEDILOL PHOSPHATE 12.5 MILLIGRAM(S): 80 CAPSULE, EXTENDED RELEASE ORAL at 17:12

## 2019-02-10 RX ADMIN — CALCITRIOL 0.25 MICROGRAM(S): 0.5 CAPSULE ORAL at 12:13

## 2019-02-10 RX ADMIN — LIDOCAINE 1 PATCH: 4 CREAM TOPICAL at 12:13

## 2019-02-10 RX ADMIN — AMLODIPINE BESYLATE 2.5 MILLIGRAM(S): 2.5 TABLET ORAL at 05:20

## 2019-02-10 RX ADMIN — Medication 2: at 12:13

## 2019-02-10 RX ADMIN — Medication 100 MILLIGRAM(S): at 05:20

## 2019-02-10 RX ADMIN — INSULIN HUMAN 10 UNIT(S): 100 INJECTION, SOLUTION SUBCUTANEOUS at 12:14

## 2019-02-10 RX ADMIN — Medication 81 MILLIGRAM(S): at 14:21

## 2019-02-10 RX ADMIN — HEPARIN SODIUM 5000 UNIT(S): 5000 INJECTION INTRAVENOUS; SUBCUTANEOUS at 05:20

## 2019-02-10 RX ADMIN — Medication 325 MILLIGRAM(S): at 12:13

## 2019-02-10 RX ADMIN — Medication 250 MILLIGRAM(S): at 05:20

## 2019-02-10 RX ADMIN — Medication 50 MILLILITER(S): at 12:13

## 2019-02-10 RX ADMIN — ONDANSETRON 4 MILLIGRAM(S): 8 TABLET, FILM COATED ORAL at 05:20

## 2019-02-10 RX ADMIN — CARVEDILOL PHOSPHATE 12.5 MILLIGRAM(S): 80 CAPSULE, EXTENDED RELEASE ORAL at 05:20

## 2019-02-10 RX ADMIN — SODIUM POLYSTYRENE SULFONATE 30 GRAM(S): 4.1 POWDER, FOR SUSPENSION ORAL at 12:15

## 2019-02-10 RX ADMIN — LIDOCAINE 1 PATCH: 4 CREAM TOPICAL at 22:04

## 2019-02-10 RX ADMIN — SACUBITRIL AND VALSARTAN 1 TABLET(S): 24; 26 TABLET, FILM COATED ORAL at 05:20

## 2019-02-10 RX ADMIN — HEPARIN SODIUM 5000 UNIT(S): 5000 INJECTION INTRAVENOUS; SUBCUTANEOUS at 22:02

## 2019-02-10 RX ADMIN — Medication 250 MILLIGRAM(S): at 10:30

## 2019-02-10 RX ADMIN — SACUBITRIL AND VALSARTAN 1 TABLET(S): 24; 26 TABLET, FILM COATED ORAL at 17:12

## 2019-02-10 RX ADMIN — HEPARIN SODIUM 5000 UNIT(S): 5000 INJECTION INTRAVENOUS; SUBCUTANEOUS at 14:21

## 2019-02-10 RX ADMIN — Medication 15 GRAM(S): at 16:28

## 2019-02-10 NOTE — DISCHARGE NOTE ADULT - SECONDARY DIAGNOSIS.
Chronic systolic congestive heart failure DM (diabetes mellitus) HLD (hyperlipidemia) Essential hypertension Pleural effusion Ascites ESRD on hemodialysis HTN (hypertension) Chronic combined systolic and diastolic congestive heart failure Hypertension, unspecified type Type 2 diabetes mellitus with hypoglycemia without coma, with long-term current use of insulin

## 2019-02-10 NOTE — PROGRESS NOTE ADULT - ASSESSMENT
57 y/o female with PMH of ESRD on HD M-W-F, DM, PAF, NICM / CHF with recent hospitalization in [facility] with + flu and right pleural effusion s/p thoracocentesis and sent home with supplemental O2 3L who now presents with recurrent right pleural effusion and moderate ascites., SOB, HAYES and leukocytosis. Patient admit for possible CAP and recurrent pleural effusions/ascites management.         Admit to medicine residents service with Dr Lloyd/Dr Agee  Monitored bed with   Diet: carb consistent with renal restrictions  Activity as tolerated      Dyspnea on excerption likely 2/2 loculated pleural effusion/ascites of unclear etiology with possible pneumonia with possible CHF exacerbation  S/p Rocephin and azithro x 1 in the ED  Will c/w Rocephin 1g QD IV and Azithromycin IV 500mg IV Q24 hrs with florastor while on ABX  F/u Sputum Cx, UCx x 2 and deescalate ABX as needed  CURB 65: 1 (Bun): 2.7% 30 days mortality  Supplemental O2 as needed  CTS consulted due to loculated pleural effusion, waiting for final recs  pulmonology consulted, waiting fore recommendations  Will consult IR for ascites and in case that CTS doesn't recommend surgical approach  S/p 20 mg IV Lasix x 1 but patient days that didn't void    CHF with preserved EF  Last TTE: 11/2018: EF 55-55%, GI diastolic disfunction  BNP: > 70,000 but patient has been taking entresto and her BNP has been elevated since before may/2018  Will give 1 dose of lasix as noted above since patient has 2+ pitting edema    DM2  Last HbA1C: 6.2 on 01/01/2019  On ISS for now with hypoglycemia protocol  Unclear how much outpatient insuline patient uses    HTN  c/w home medications:  Coreg 12.5mg BID PO  Amlodipine 2.5 QD PO  Entresto 49/51 PO QD    ESRD  Outpatient schedule: M-W-F  Last HD: 02/09/2019.  HD as per nephrology: M-W-F.     Prophylactic measure:  Heparin 5000 UI SC Q 8hrs  Florastor while on Abx  Omeprazol 40mg PO    CODE status: FULL CODE 57 y/o female with PMH of ESRD on HD M-W-F, DM, PAF, NICM / CHF with recent hospitalization in [facility] with + flu and right pleural effusion s/p thoracocentesis and sent home with supplemental O2 3L who now presents with recurrent right pleural effusion and moderate ascites., SOB, HAYES and leukocytosis. Patient admited for possible CAP and recurrent pleural effusions/ascites management.         Monitored bed with   Diet: carb consistent with renal restrictions  Activity as tolerated    Dyspnea on excerption likely 2/2 loculated pleural effusion/ascites of unclear etiology with possible pneumonia with possible CHF exacerbation  S/p Rocephin and azithro x 1 in the ED  Will c/w Rocephin 1g QD IV and Azithromycin IV 500mg IV Q24 hrs with florastor while on ABX  F/u Sputum Cx, UCx x 2 and deescalate ABX as needed  CURB 65: 1 (Bun): 2.7% 30 days mortality  Supplemental O2 as needed  CTS consulted due to loculated pleural effusion, waiting for final recs  pulmonology consulted, waiting fore recommendations  Will consult IR for ascites and in case that CTS doesn't recommend surgical approach  S/p 20 mg IV Lasix x 1 but patient days that didn't void    CHF with preserved EF  Last TTE: 11/2018: EF 55-55%, GI diastolic disfunction  BNP: > 70,000 but patient has been taking entresto and her BNP has been elevated since before may/2018  Will give 1 dose of lasix as noted above since patient has 2+ pitting edema    DM2  Last HbA1C: 6.2 on 01/01/2019  On ISS for now with hypoglycemia protocol  Unclear how much outpatient insuline patient uses    HTN  c/w home medications:  Coreg 12.5mg BID PO  Amlodipine 2.5 QD PO  Entresto 49/51 PO QD    ESRD  Outpatient schedule: M-W-F  Last HD: 02/09/2019.  HD as per nephrology: M-W-F.     Prophylactic measure:  Heparin 5000 UI SC Q 8hrs  Florastor while on Abx  Omeprazol 40mg PO    CODE status: FULL CODE 57 y/o female with PMH of ESRD on HD M-W-F, DM, PAF, NICM / CHF with recent hospitalization in [facility] with + flu and right pleural effusion s/p thoracocentesis and sent home with supplemental O2 3L who now presents with recurrent right pleural effusion and moderate ascites., SOB, HAYES and leukocytosis. Patient admited for possible CAP and recurrent pleural effusions/ascites management.         Monitored bed with   Diet: carb consistent with renal restrictions  Activity as tolerated    Dyspnea on excerption likely 2/2 loculated pleural effusion/ascites of unclear etiology with possible pneumonia with possible CHF exacerbation  S/p Rocephin and azithro x 1 in the ED  Upgrading ABx coverage with Vanco and Cefepime. Florastor while on ABX  F/u Sputum Cx, UCx x 2 and deescalate ABX as needed  CURB 65: 1 (Bun): 2.7% 30 days mortality  Supplemental O2 as needed  CTS consulted due to loculated pleural effusion, waiting for final recs  pulmonology consulted, waiting fore recommendations  Will consult IR for loculated pleural effusions and ascites and in case that CTS doesn't recommend surgical approach  S/p 20 mg IV Lasix x 1 but patient days that didn't void      CHF with preserved EF  Last TTE: 11/2018: EF 55-55%, GI diastolic disfunction  BNP: > 70,000 but patient has been taking entresto and her BNP has been elevated since before may/2018  Will give 1 dose of lasix as noted above since patient has 2+ pitting edema    DM2  Last HbA1C: 6.2 on 01/01/2019  On ISS for now with hypoglycemia protocol  Unclear how much outpatient insuline patient uses    HTN  c/w home medications:  Coreg 12.5mg BID PO  Amlodipine 2.5 QD PO  Entresto 49/51 PO QD    ESRD  Outpatient schedule: M-W-F  Last HD: 02/09/2019.  HD as per nephrology: M-W-F.     Prophylactic measure:  Heparin 5000 UI SC Q 8hrs  Florastor while on Abx  Omeprazol 40mg PO    CODE status: FULL CODE 57 y/o female with hx of ESRD on HD M-W-F, DM2, PAF, NICM / chronic diastolic HF with recent hospitalization in [facility] with + flu and right pleural effusion s/p thoracocentesis and sent home with supplemental O2 3L who now presents with HAYES and noted right loculated pleural effusion on ct chest; unclear etiology and noted hypervolemia with peripheral edema, moderate ascites.      Dyspnea on excerption likely 2/2 loculated pleural effusion with concomitant fluid overload noted peripheral edema/ ascites in the setting ESRD/ CHF   pt afebrile   - no leukocytosis   -pt remains sob this am   Upgrading ABx coverage to Vanco and Cefepime  c/w Florastor while on ABX  F/u Sputum and BCx   -f/u HIV/ quantiferon/ RF/ ESR   c/w Supplemental O2 as needed  Pulmonology consult noted and appreciated and d/w Dr. Velez the plan of care   CT Sx consult noted and appreciated awaiting further recommendations, loculated effusion will need drainage   Nephro f/u noted and appreciated and d/w Dr. Larsen the plan of care, wanted to perform additional HD today but pt wants to hold off until tomorrow     Chronic diastolic HFPEF   Pt with noted hypervolemia   Last TTE: 11/2018: EF 55-55%   BNP: > 70,000  Strict I/o   Fluid restriction   c/w entresto po bid   c/w coreg po bid   Will offload fluid with dialysis     ESRD on HD   Last HD: 02/08/2019  Advised pt to receive HD today to offload fluid but pt wants to have HD as scheduled M W F   c/w calcitriol 0.25mg po qd   Nephro f/u noted and appreciated     DM2  Last HbA1C: 6.2 on 01/01/2019  fs were stable but post lung with hypoglycemia   D/c HSS for now unclear etiology of low fs   c/w hypoglycemia protocol     HTN  bp stable   c/w Coreg 12.5mg BID PO  c/w Amlodipine 2.5 QD PO  c/w Entresto 49/51 PO QD    Prophylactic measure:  Heparin 5000 UI SC Q 8hrs  Florastor while on Abx  Omeprazol 40mg PO    Advanced Directive: Full code     Dispo: PT consulted. 57 y/o female with hx of ESRD on HD M-W-F, DM2, PAF, NICM / chronic diastolic HF with recent hospitalization in [facility] with + flu and right pleural effusion s/p thoracocentesis and sent home with supplemental O2 3L who now presents with HAYES and noted right loculated pleural effusion on ct chest; unclear etiology and noted hypervolemia with peripheral edema, moderate ascites.      Dyspnea on excerption likely 2/2 loculated pleural effusion with concomitant fluid overload noted peripheral edema/ ascites in the setting ESRD/ CHF   pt afebrile   - no leukocytosis   -pt remains sob this am   Upgrading ABx coverage to Vanco and Cefepime  c/w Florastor while on ABX  F/u Sputum and BCx   -f/u HIV/ quantiferon/ RF/ ESR   c/w Supplemental O2 as needed  Pulmonology consult noted and appreciated and d/w Dr. Velez the plan of care   CT Sx consult noted and appreciated awaiting further recommendations, loculated effusion will need drainage   Nephro f/u noted and appreciated and d/w Dr. Larsen the plan of care, wanted to perform additional HD today but pt wants to hold off until tomorrow     Chronic diastolic HFPEF   Pt with noted hypervolemia   Last TTE: 11/2018: EF 55-55%   BNP: > 70,000  Strict I/o   Fluid restriction   c/w entresto po bid   c/w coreg po bid   Will offload fluid with dialysis     ESRD on HD   Last HD: 02/08/2019  noted hyperkalemia; reversal with insulin/ dextrose and Kayexalate As per nephro repeat bmp in the am.   Advised pt to receive HD today to offload fluid but pt wants to have HD as scheduled M W F   c/w calcitriol 0.25mg po qd   Nephro f/u noted and appreciated     DM2  Last HbA1C: 6.2 on 01/01/2019  fs were stable but post lung with hypoglycemia   D/c HSS for now unclear etiology of low fs   c/w hypoglycemia protocol     HTN  bp stable   c/w Coreg 12.5mg BID PO  c/w Amlodipine 2.5 QD PO  c/w Entresto 49/51 PO QD    Anemia in ESRD  h/h stable  c/w monitoring h/h     Prophylactic measure:  Heparin 5000 UI SC Q 8hrs  Florastor while on Abx  Omeprazol 40mg PO    Advanced Directive: Full code     Dispo: PT consulted.

## 2019-02-10 NOTE — DISCHARGE NOTE ADULT - PATIENT PORTAL LINK FT
You can access the [facility] Patient Portal, offered by [facility], by registering with the following website: [email or web address]

## 2019-02-10 NOTE — CONSULT NOTE ADULT - ASSESSMENT
CHfpEF, ESRD  hypoxemia  moderate loculated R pleural eff, previous pigtail, exudate by LDH, mostly mononuclear cells, cyto and cultures neg  R perihilar infiltrate without change from 12/18,? etiology , septal thickening on R, never smoker  also small L eff and ascites  unclear if pna clinically or radiographically, check cultures, procalcitonin, continue abx for now  re check echocardiogram, aggressive  fluid removal on HD  routine serologies, RVP neg ( flu A in past)  Will discuss with T surg  prognosis guarded CHfpEF, ESRD  hypoxemia  moderate loculated R pleural eff, previous pigtail, exudate by LDH, mostly mononuclear cells, cyto and cultures neg  R perihilar infiltrate without change from 12/18,? etiology , septal thickening on R, never smoker  also small L eff and ascites  unclear if pna clinically or radiographically, check cultures, procalcitonin, continue abx for now  re check echocardiogram, aggressive  fluid removal on HD  routine serologies, RVP neg ( flu A in past)  persistent lymphopenia, check HIV  Will discuss with T surg  prognosis guarded CHfpEF, ESRD  hypoxemia  moderate loculated R pleural eff, previous pigtail, exudate by LDH, mostly mononuclear cells, cyto and cultures neg  R perihilar infiltrate without change from 12/18,? etiology , septal thickening on R, never smoker  also small L eff and ascites  unclear if pna clinically or radiographically, but loculations  bothersome and not c/w with fluid overload alone, check cultures, procalcitonin, continue abx for now  re check echocardiogram, aggressive  fluid removal on HD  routine serologies, RVP neg ( flu A in past)  persistent lymphopenia, check HIV  Will discuss with T surg, re IR drainage  prognosis guarded CHfpEF, ESRD  hypoxemia  moderate loculated R pleural eff, previous pigtail, exudate by LDH, mostly mononuclear cells, cyto and cultures neg  R perihilar infiltrate without change from 12/18,? etiology , septal thickening on R, never smoker  also small L eff and ascites  unclear if pna clinically or radiographically, but loculations  bothersome and not c/w with fluid overload alone, check cultures, procalcitonin, continue abx for now  re check echocardiogram, aggressive  fluid removal on HD  routine serologies, RVP neg ( flu A in past)  persistent lymphopenia, check HIV, quantiferon  Will discuss with T surg, re IR drainage  prognosis guarded

## 2019-02-10 NOTE — DISCHARGE NOTE ADULT - INSTRUCTIONS
Follow the DASH Diet: Dietary Approaches to Stop Hypertension) is a dietary pattern promoted by the U[place].-based National Heart, Lung, and Blood [employer] [part of the National Institutes of Health ("NIH"), an agency of the United States Department of Health and Human Services] to prevent and control hypertension. The DASH diet is rich in fruits, vegetables, whole grains, and low-fat dairy foods; includes meat, fish, poultry, nuts, and beans; and is limited in sugar-sweetened foods and beverages, red meat, and added fats. In addition to its effect on blood pressure, it is designed to be a well-balanced approach to eating for the general public. DASH is recommended by the [place] Department of Agriculture ("USDA") as one of its ideal eating plans for all Americans.   -Please do not drink more than 1 lit of fluid daily

## 2019-02-10 NOTE — CONSULT NOTE ADULT - SUBJECTIVE AND OBJECTIVE BOX
PULMONARY CONSULT NOTE      FRANNIE MORALESEARNEST-[id number]    Patient is a 58y old  Female who presents with a chief complaint of sent from pulmonology office (10 Feb 2019 07:33)      HISTORY OF PRESENT ILLNESS:  no cough or sputum  no fever, chill, chest pain  on HD MWF  chronic dypnea  no hx rheumatic disease    MEDICATIONS  (STANDING):  amLODIPine   Tablet 2.5 milliGRAM(s) Oral daily  aspirin enteric coated 81 milliGRAM(s) Oral daily  calcitriol   Capsule 0.25 MICROGram(s) Oral daily  carvedilol 12.5 milliGRAM(s) Oral every 12 hours  cefepime   IVPB 2000 milliGRAM(s) IV Intermittent daily  dextrose 5%. 1000 milliLiter(s) (50 mL/Hr) IV Continuous <Continuous>  dextrose 50% Injectable 12.5 Gram(s) IV Push once  dextrose 50% Injectable 25 Gram(s) IV Push once  dextrose 50% Injectable 25 Gram(s) IV Push once  docusate sodium 100 milliGRAM(s) Oral two times a day  ferrous    sulfate 325 milliGRAM(s) Oral daily  heparin  Injectable 5000 Unit(s) SubCutaneous every 8 hours  insulin lispro (HumaLOG) corrective regimen sliding scale   SubCutaneous Before meals and at bedtime  pantoprazole    Tablet 40 milliGRAM(s) Oral before breakfast  polyethylene glycol 3350 17 Gram(s) Oral daily  saccharomyces boulardii 250 milliGRAM(s) Oral two times a day  sacubitril 49 mG/valsartan 51 mG 1 Tablet(s) Oral two times a day  senna 2 Tablet(s) Oral at bedtime  vancomycin  IVPB 1000 milliGRAM(s) IV Intermittent every 48 hours      MEDICATIONS  (PRN):  dextrose 40% Gel 15 Gram(s) Oral once PRN Blood Glucose LESS THAN 70 milliGRAM(s)/deciliter  glucagon  Injectable 1 milliGRAM(s) IntraMuscular once PRN Glucose LESS THAN 70 milligrams/deciliter      Allergies    No Known Allergies    Intolerances        PAST MEDICAL & SURGICAL HISTORY:  Coronary artery disease, angina presence unspecified, unspecified vessel or lesion type, unspecified whether native or transplanted heart  Paroxysmal atrial fibrillation  Anemia due to chronic kidney disease, unspecified CKD stage  Chronic systolic congestive heart failure  Pleural effusion  Pericardial effusion  End stage renal disease on dialysis  HLD (hyperlipidemia)  HTN (hypertension)  DM (diabetes mellitus)  A-V fistula  Encounter for dialysis catheter care      FAMILY HISTORY:  Family history of kidney disease in brother (Sibling)      SOCIAL HISTORY  Smoking History:     REVIEW OF SYSTEMS:    CONSTITUTIONAL:  No fevers, chills, sweats    HEENT:  Eyes:  No diplopia or blurred vision. ENT:  No earache, sore throat or runny nose.    CARDIOVASCULAR:  No pressure, squeezing, tightness, or heaviness about the chest; no palpitations.    RESPIRATORY:  see HPI    GASTROINTESTINAL:  No abdominal pain, nausea, vomiting or diarrhea.    GENITOURINARY:  No dysuria, frequency or urgency.    NEUROLOGIC:  No paresthesias, fasciculations, seizures or weakness.    PSYCHIATRIC:  No disorder of thought or mood.    Vital Signs Last 24 Hrs  T(C): 37.1 (10 Feb 2019 08:00), Max: 37.1 (10 Feb 2019 08:00)  T(F): 98.8 (10 Feb 2019 08:00), Max: 98.8 (10 Feb 2019 08:00)  HR: 92 (10 Feb 2019 08:00) (91 - 105)  BP: 147/88 (10 Feb 2019 08:00) (147/88 - 162/100)  BP(mean): --  RR: 22 (10 Feb 2019 08:00) (18 - 28)  SpO2: 95% (09 Feb 2019 19:57) (91% - 97%)    PHYSICAL EXAMINATION:    GENERAL: The patient is ill looking _in no apparent distress, non toxic    HEENT: Head is normocephalic and atraumatic. Extraocular muscles are intact. Mucous membranes are moist.     NECK: Supple.     LUNGS: moderate air entry bilat without wheezing, rales, or rhonchi. Respirations unlabored    HEART: Regular rate and rhythm without murmur.    ABDOMEN: Soft, nontender, and nondistended.  No hepatosplenomegaly is noted.    EXTREMITIES: Without any cyanosis, clubbing, rash, lesions or edema.    NEUROLOGIC: Grossly intact.      LABS:                        10.7   4.3   )-----------( 195      ( 10 Feb 2019 07:15 )             37.1     02-10    135  |  95<L>  |  26.0<H>  ----------------------------<  170<H>  5.6<H>   |  25.0  |  4.46<H>    Ca    8.9      10 Feb 2019 07:15  Phos  4.1     02-10  Mg     2.3     02-10    TPro  7.4  /  Alb  3.1<L>  /  TBili  0.5  /  DBili  x   /  AST  14  /  ALT  5   /  AlkPhos  140<H>  02-09    PT/INR - ( 09 Feb 2019 13:03 )   PT: 13.7 sec;   INR: 1.19 ratio         PTT - ( 09 Feb 2019 13:03 )  PTT:31.3 sec            Serum Pro-Brain Natriuretic Peptide: >89430 pg/mL (02-09-19 @ 15:12)          MICROBIOLOGY:    RADIOLOGY & ADDITIONAL STUDIES:  cxr and CT scans reviewed

## 2019-02-10 NOTE — DISCHARGE NOTE ADULT - HOSPITAL COURSE
57 y/o female with PMH of ESRD on HD M-W-F, DM, PAF, NICM / CHF with recent hospitalization in [facility] with + flu and right pleural effusion s/p thoracocentesis and sent home with supplemental O2 3L who now presents with recurrent right pleural effusion and moderate ascites., SOB, HAYES and leukocytosis. Patient admited for possible CAP and recurrent pleural effusions/ascites management. 59 y/o female with PMH of ESRD on HD M-W-F, DM, PAF, NICM / CHF with recent hospitalization in [facility] with + flu and right pleural effusion s/p thoracocentesis and sent home with supplemental O2 3L who now presents with recurrent right pleural effusion and moderate ascites., SOB, HAYES and leukocytosis. Patient admited for possible CAP and recurrent pleural effusions/ascites management. Pt been treated with cefepime and vancomycin due to high risk or HCAP . Pt been evaluated by infectious disease and pulmonology . 57 yo female with hx of ESRD on HD MWF, chronic HFrEF (EF 35-40% TTE 19), NICM (cardiac cath  non-obstructive CAD), paroxysmal Afib (no AC due to GI bleed), uremic pericarditis s/p pericardiocentesis, right sided pleural effusion s/p pigtail catheter, IDDM, and HTN with recent hospitalization in [facility]  positive Influenza A complicated with right pleural effusion requiring thoracocentesis and discharged home on supplemental O2 3L/min, who presented  to ED referred from Pulmonologist after finding of loculated R pleural effusion on CT chest of unclear etiology, associated with dyspnea on exertion and noted peripheral edema, with moderate ascites. Admitted with diagnosis of Volume Overload, Acute on chronic combined HF, ESRD on HD.     Patient was started on Vancomycin and Cefepime due to h/o thoracocentesis on recent admission for Influenza A with superimposed pneumonia, due to risk of Sthap infection causing empyema  Blood cultures resulted negative and Vancomycin was D/C. Patient completed 10 days of Cefepime.     Pt received consecutive HD sessions x 7 to offload fluid with improvement in shortness of breath and fluid overload.     CT surgery and IR followed the case. CT surgery recommended paracentesis prior to thoracocentesis, and as patient volumen overload responded with dialysis, thoracocentesis was not performed. Paracentesis was performed on 02/15/19 draining 3550mL of fluid. Both interventions resulting in return of respiratory status and O2 requirements to patient's baseline. Preliminary results of ascitic fluid showed SAAG >1.1. Abdominal US and CT abdomen showed no sisngs of cirrhosis, liver hypertension, or other lesions for which volumen overload is thought to be secondary to congestive heart failure. Cytology report from Paracentesis is still pending at time of discharge.     TTE completed and noted drop in EF from 55% to 35-40% and mobile density noted on aortic valve leaflets, upon review of prior TTE, the latter finding was already present and EF might have been overestimated as per cardiology. 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 Blood cultures  have remained negative as mentioned above.     Subsequent chest xrays have shown improvement of pleural effusions and correlated with improvement of SOB and O2 saturation in the patient.     Vitals were closely followed and corrected as needed. All electrolyte abnormalities were monitored carefully and repleted as necessary during this hospitalization.    Patient is medically stable at the time of discharge with recommendations to follow up with PCP, Pulmonologist and nephrologist within 1 week from discharge.     Estimated time for discharge plannin minutes  _____________________________________________ 57 yo female with hx of ESRD on HD MWF, chronic HFrEF (EF 35-40% TTE 19), NICM (cardiac cath  non-obstructive CAD), paroxysmal Afib (no AC due to GI bleed), uremic pericarditis s/p pericardiocentesis, right sided pleural effusion s/p pigtail catheter, IDDM, and HTN with recent hospitalization in [facility]  positive Influenza A complicated with right pleural effusion requiring thoracocentesis and discharged home on supplemental O2 3L/min, who presented  to ED referred from Pulmonologist after finding of loculated R pleural effusion on CT chest of unclear etiology, associated with dyspnea on exertion and noted peripheral edema, with moderate ascites. Admitted with diagnosis of Volume Overload, Acute on chronic combined HF, ESRD on HD.     Patient was started on Vancomycin and Cefepime due to h/o thoracocentesis on recent admission for Influenza A with superimposed pneumonia, due to risk of Sthap infection causing empyema  Blood cultures resulted negative and Vancomycin was D/C. Patient completed 10 days of Cefepime.     Pt received consecutive HD sessions x 7 to offload fluid with improvement in shortness of breath and fluid overload.     CT surgery and IR followed the case. CT surgery recommended paracentesis prior to thoracocentesis, and as patient volumen overload responded with dialysis, thoracocentesis was not performed. Paracentesis was performed on 02/15/19 draining 3550mL of fluid. Both interventions resulting in return of respiratory status and O2 requirements to patient's baseline. Preliminary results of ascitic fluid showed SAAG >1.1. Abdominal US and CT abdomen showed no sisngs of cirrhosis, liver hypertension, or other lesions for which volumen overload is thought to be secondary to congestive heart failure. Cytology report from Paracentesis is still pending at time of discharge.     TTE completed and noted drop in EF from 55% to 35-40% and mobile density noted on aortic valve leaflets, upon review of prior TTE, the latter finding was already present and EF might have been overestimated as per cardiology. 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 Blood cultures  have remained negative as mentioned above.     Subsequent chest xrays have shown improvement of pleural effusions and correlated with improvement of SOB and O2 saturation in the patient.     Vitals were closely followed and corrected as needed. All electrolyte abnormalities were monitored carefully and repleted as necessary during this hospitalization.    Patient is medically stable at the time of discharge with recommendations to follow up with PCP, Pulmonologist and nephrologist within 1 week from discharge.     Estimated time for discharge plannin minutes  _____________________________________________    PMD contacted and made aware of hospital discharge. Follow up appointment advised within 7 days of discharge.

## 2019-02-10 NOTE — DISCHARGE NOTE ADULT - OTHER SIGNIFICANT FINDINGS
11.3   3.4   )-----------( 230      ( 17 Feb 2019 07:06 )             37.7   02-18    134<L>  |  94<L>  |  24.0<H>  ----------------------------<  151<H>  4.8   |  27.0  |  4.44<H>    Ca    8.8      18 Feb 2019 08:05  Phos  5.2     02-18  Mg     2.2     02-18    TPro  7.3  /  Alb  3.2<L>  /  TBili  0.5  /  DBili  x   /  AST  12  /  ALT  <5  /  AlkPhos  101  02-17    < from: US Doppler Portal/Hepatic Vein (02.18.19 @ 12:03) >  impression:    There is small ascites and small bilateral pleural effusions. Portal   venous system is patent. Hepatic venous system is patent. Normal   directional flow. No hepatic venous thrombosis.    < end of copied text >    < from: US Abdomen Limited (02.13.19 @ 15:52) >  FINDINGS:    Sonographic evaluation of 4 quadrants of the abdomen demonstrates mild to   moderate ascites.    Nonspecific gallbladder wall thickening is noted measuring 8 mm. There is   possibility of a 4 mm sized adherent calculus within the gallbladder.    Visualized right kidney demonstrates increased cortical echogenicity   indicating renal parenchymal disease.    There is evidence of bilateral pleural effusions.      IMPRESSION:     Mild to moderate ascites.    Renal parenchymal disease involving the visualized right kidney.    Additional findings as above.    < end of copied text >    < from: CT Chest No Cont (02.09.19 @ 11:53) >  Impression:   -Right perihilar consolidation and right interlobular septal thickening;   differential diagnosis includes malignancy and pneumonia.  -Small-moderate right pleural effusion is loculated, differential   diagnosis includes malignancy, parapneumonic effusion, or empyema.  -Severe global cardiomegaly.  -Small-moderate ascites, unclear etiology.    < end of copied text >    < from: TTE Echo Complete w/Doppler (02.11.19 @ 15:11) >  Summary:   1. Moderately decreased global left ventricular systolic function.   2. Left ventricular ejection fraction, by visual estimation, is 35 to   40%.   3. Severely enlarged left atrium.   4. Spectral Doppler shows pseudonormal pattern of left ventricular   myocardial filling (Grade II diastolic dysfunction).   5. Moderately enlarged right ventricle Moderately reduced RV systolic   function.   6. Mild mitral valve regurgitation.   7. Mild tricuspid regurgitation.   8. Mobile echo density is visualized on the aortic valve leaflets most   consistent with Lambl's excrescences, however cannot entirely exclude   vegetation. Recommend GERALD if clinically correlated.    < end of copied text >

## 2019-02-10 NOTE — CONSULT NOTE ADULT - SUBJECTIVE AND OBJECTIVE BOX
[facility]  INFECTIOUS DISEASES AND INTERNAL MEDICINE at [place]  =======================================================  Jose Carlos Servin MD  Diplomates American Board of Internal Medicine and Infectious Diseases  =======================================================    MRN-[id number]  JAROCHO MORALES     CC: loculated pleural effusion    HPI:  59 y/o woman with PMH of ESRD on HD, DM2, Paroxysmal AFib, CHF with recent hospitalization in [facility] with + flu and right pleural effusion s/p thoracocentesis and sent home with supplemental O2 3L brought in from home, during her outpt follow up had a CXR that showed loculated effusion so Dr. Nolasco send her for admission and work up.  Patient has a baseline cough for 2 months, with whitish sputum, and also orthopnea.   Chest CT in [facility] showed multiple loculations in right lung so ID was called for recommendation.     PAST MEDICAL & SURGICAL HISTORY:  Coronary artery disease, angina presence unspecified, unspecified vessel or lesion type, unspecified whether native or transplanted heart  Paroxysmal atrial fibrillation  Anemia due to chronic kidney disease, unspecified CKD stage  Chronic systolic congestive heart failure  Pleural effusion  Pericardial effusion  End stage renal disease on dialysis  HLD (hyperlipidemia)  HTN (hypertension)  DM (diabetes mellitus)  A-V fistula  Encounter for dialysis catheter care    Social Hx: no smoking or other toxic habits    FAMILY HISTORY:  Family history of kidney disease in brother (Sibling)    Allergies  No Known Allergies    Antibiotics:  cefepime   IVPB 2000 milliGRAM(s) IV Intermittent daily  vancomycin  IVPB 1000 milliGRAM(s) IV Intermittent every 48 hours     REVIEW OF SYSTEMS:  CONSTITUTIONAL:  No Fever or chills  HEENT:  No diplopia or blurred vision.  No sore throat or runny nose.  CARDIOVASCULAR:  No chest pain or SOB.  RESPIRATORY:  +Cough, +shortness of breath, PND or orthopnea.  GASTROINTESTINAL:  No nausea, vomiting or diarrhea.  GENITOURINARY:  No dysuria, frequency or urgency. No Blood in urine  MUSCULOSKELETAL:  no joint aches, no muscle pain  SKIN:  No change in skin, hair or nails.  NEUROLOGIC:  No paresthesias, fasciculations, seizures or weakness.  PSYCHIATRIC:  No disorder of thought or mood.  ENDOCRINE:  No heat or cold intolerance, polyuria or polydipsia.  HEMATOLOGICAL:  No easy bruising or bleeding.     Physical Exam:  Vital Signs Last 24 Hrs  T(C): 37.1 (10 Feb 2019 08:00), Max: 37.1 (10 Feb 2019 08:00)  T(F): 98.8 (10 Feb 2019 08:00), Max: 98.8 (10 Feb 2019 08:00)  HR: 92 (10 Feb 2019 08:00) (92 - 105)  BP: 147/88 (10 Feb 2019 08:00) (147/88 - 162/100)  RR: 22 (10 Feb 2019 08:00) (18 - 28)  SpO2: 95% (09 Feb 2019 19:57) (91% - 97%)  GEN: NAD on o2  HEENT: normocephalic and atraumatic. EOMI. PERRL.    NECK: Supple.  No lymphadenopathy   LUNGS: Decreased BS in R lung in general, R lung with rales all over and left lung only on base.   HEART: Regular rate and rhythm   ABDOMEN: Soft, nontender, and nondistended.  Positive bowel sounds.    : No CVA tenderness  EXTREMITIES: Without any cyanosis, clubbing, rash, lesions or edema.  NEUROLOGIC: grossly intact.  PSYCHIATRIC: Appropriate affect .  SKIN: No ulceration or induration present.    Labs:  02-10    135  |  95<L>  |  26.0<H>  ----------------------------<  170<H>  5.6<H>   |  25.0  |  4.46<H>    Ca    8.9      10 Feb 2019 07:15  Phos  4.1     02-10  Mg     2.3     02-10    TPro  7.4  /  Alb  3.1<L>  /  TBili  0.5  /  DBili  x   /  AST  14  /  ALT  5   /  AlkPhos  140<H>  02-09                        10.7   4.3   )-----------( 195      ( 10 Feb 2019 07:15 )             37.1     PT/INR - ( 09 Feb 2019 13:03 )   PT: 13.7 sec;   INR: 1.19 ratio    PTT - ( 09 Feb 2019 13:03 )  PTT:31.3 sec    LIVER FUNCTIONS - ( 09 Feb 2019 15:12 )  Alb: 3.1 g/dL / Pro: 7.4 g/dL / ALK PHOS: 140 U/L / ALT: 5 U/L / AST: 14 U/L / GGT: x           RECENT CULTURES:  02-09 @ 23:23      [facility]    All imaging and other data have been reviewed.    Impression: Chest CT 2/9  -Right perihilar consolidation and right interlobular septal thickening;   differential diagnosis includes malignancy and pneumonia.  -Small-moderate right pleural effusion is loculated, differential   diagnosis includes malignancy, parapneumonic effusion, or empyema.  -Severe global cardiomegaly.  -Small-moderate ascites, unclear etiology.

## 2019-02-10 NOTE — DISCHARGE NOTE ADULT - PLAN OF CARE
Continue with  hemodialysis as scheduled, follow up with Pulmonologist, nephrologist and primary care doctor as outpatient. Having too much water in your body is called fluid overload or hypervolemia. One of the main functions of the kidneys is to balance fluid in the body. If too much fluid builds up in your body, it can have harmful effects on your health, such as difficulty breathing and swelling. You were found to have excesive fluid in your legs, around your lungs Continue with  hemodialysis as scheduled, follow up with Pulmonologist as outpatient. Continue with hemodialysis as scheduled, Follow up results of Biopsy with nephrologist. ***FOLLOW UP RESULTS OF CYTOLOGY WITH NEPHROLOGIST. ****  Ascites is excess fluid in the belly. The fluid causes swelling in the belly. Most people who develop ascites develop a large belly and experience a rapid gain in weight. Some people also develop swelling of the ankles and shortness of breath. You were noted to have fluid free in your belly by physical exam and was confirmed by Ct scan and ultrasound of your abdomen. You were examined by intervention radiology and a paracentesis was performed. Paracentesis is a procedure that is done to remove fluid from the abdomen. A sample of that fluid was sent to the pathologist for analysis. It is probable that the fluid is secondary to buildup of fluid due to your heart and kidney problems. As mentioned above, you need to follow up with your primary care doctor and nephrologist for further management. Continue with hemodialysis as scheduled. prevent further episodes, take your medication and prescribed, follow up with primary care doctor. Blood pressure <140/ 90,  take your medication as prescribed, follow up with primary care doctor. Your blood pressure was controlled during your hospitalization.  You need to take your medication as prescribed, and follow a low sodium diet. Follow up with your Primary Care Doctor to continue having your blood pressure checked on a continual basis. HbA1c <7, take your medication as prescribed, follow up with primary care doctor. Follow a low carbohydrate diet,  low  on sugars and fat. Continue to take insulin as prescribed. Follow up with your Primary Care Doctor regularly for blood sugar/A1c checks. Be sure to see an eye doctor and foot doctor annually and follow up with your primary care doctor within 1 week from your discharge for further management. Having too much water in your body is called fluid overload or hypervolemia. One of the main functions of the kidneys is to balance fluid in the body. If too much fluid builds up in your body, it can have harmful effects on your health, such as difficulty breathing and swelling. You were found to have excessive fluid in your legs, around your lungs and in your belly. During this hospitalization, you were found to have a pleural effusion, which means you have excess fluid buildup in the space between the layers of the pleura. The pleura are thin layers of tissue that form a 2-layered lining around the lungs. One layer of the pleura rests directly on the lungs. The other layer rests on the chest wall. There is normally a small amount of fluid called pleural fluid between these layers.     Do not smoke , and do not allow others to smoke around you.    Deep breathing and coughing will decrease your risk for a lung infection. Take a deep breath and hold it for as long as you can. Let the air out and then cough strongly. Deep breaths help open your airway. and take a slow, deep breath. Then let the air out and cough. Repeat these steps 10 times every hour.    If you experience coughing, holding a pillow over your chest when you cough may help decrease the pain.    Call your doctor if you are coughing up blood, have chest pain, shortness of breath or a fever of 100.4. ***FOLLOW UP RESULTS OF CYTOLOGY . ****  Ascites is excess fluid in the belly. The fluid causes swelling in the belly. Most people who develop ascites develop a large belly and experience a rapid gain in weight. Some people also develop swelling of the ankles and shortness of breath. You were noted to have fluid free in your belly by physical exam and was confirmed by Ct scan and ultrasound of your abdomen. You were examined by intervention radiology and a paracentesis was performed. Paracentesis is a procedure that is done to remove fluid from the abdomen. A sample of that fluid was sent to the pathologist for analysis. It is probable that the fluid is secondary to buildup of fluid due to your heart and kidney problems. As mentioned above, you need to follow up with your primary care doctor and nephrologist for further management. Continue following a renal diet as advised by your Nephrologist. If you have been started on any medications for your kidney function, be sure to take them regularly. Be sure to follow up and have dialysis as scheduled, if it very important that you do not miss sessions! If something comes up and you feel that you cannot make a session, please alert your nephrologist immediately. prevent further episodes, take your medication and prescribed, follow up with primary care doctor and cardiologist You have  history of heart failure. Upon your admission it was noticed that the amount of fluid your heart is pumping has reduced, this contributed to the fluid overload you presented. A echo of your heart was done during this admission and a structure growing in one of the valves of your heart was seen. This image was seen on a previous study you had and has not change, you need to follow up with your cardiologist for further management. Take all your medication as indicated.     Heart failure is a condition in which the heart can't pump enough blood to meet the body's needs. Heart failure does not mean that your heart has stopped or is about to stop working. It means that your heart is not able to pump blood the way it should. It can affect one or both sides of the heart.    The weakening of the heart's pumping ability causes  •Blood and fluid to back up into the lungs  •The buildup of fluid in the feet, ankles and legs - called edema  •Tiredness and shortness of breath    Follow up with your primary care doctor and Cardiologist within 1 week from your discharge. . You have acute on chronic combined heart failure. prevent further episodes, take your medication and prescribed, follow up with primary care doctor and cardiologist HbA1c <7, take your medication as prescribed, follow up with primary care doctor. yours is 6.2

## 2019-02-10 NOTE — DISCHARGE NOTE ADULT - MEDICATION SUMMARY - MEDICATIONS TO TAKE
I will START or STAY ON the medications listed below when I get home from the hospital:    aspirin 81 mg oral delayed release tablet  -- 1 tab(s) by mouth once a day  -- Indication: For High blood pressure    Entresto 49 mg-51 mg oral tablet  -- 1 tab(s) by mouth once a day  -- Indication: For High blood pressure    calcium carbonate 1250 mg (500 mg elemental calcium) oral tablet  -- 1 tab(s) by mouth 3 times a day  -- Indication: For Kidney disease    insulin glargine  -- 4 unit(s) subcutaneous once a day (at bedtime)  -- Indication: For Diabetes    Coreg 12.5 mg oral tablet  -- 1 tab(s) by mouth 2 times a day   -- It is very important that you take or use this exactly as directed.  Do not skip doses or discontinue unless directed by your doctor.  May cause drowsiness.  Alcohol may intensify this effect.  Use care when operating dangerous machinery.  Some non-prescription drugs may aggravate your condition.  Read all labels carefully.  If a warning appears, check with your doctor before taking.  Take with food or milk.    -- Indication: For High blood pressure    amLODIPine 2.5 mg oral tablet  -- 1 tab(s) by mouth once a day  -- Indication: For High blood pressure    FeroSul 325 mg (65 mg elemental iron) oral tablet  -- 1 tab(s) by mouth once a day   -- Indication: For Anemia    Colace 100 mg oral capsule  -- 1 cap(s) by mouth 2 times a day, As Needed -for constipation   -- Indication: For Stool softener    senna oral tablet  -- 2 tab(s) by mouth once a day (at bedtime), As needed, Constipation  -- Indication: For Stool softener    pantoprazole 40 mg oral delayed release tablet  -- 1 tab(s) by mouth once a day  -- Indication: For Prevent gastritis /  acid reflux     calcitriol 0.25 mcg oral capsule  -- 1 cap(s) by mouth once a day  -- Indication: For Kidney disease

## 2019-02-10 NOTE — CONSULT NOTE ADULT - ASSESSMENT
59 y/o woman with PMH of ESRD on HD, DM2, Paroxysmal AFib, CHF with recent hospitalization in [facility] with + flu and right pleural effusion s/p thoracocentesis and sent home with supplemental O2 3L brought in from home, during her outpt follow up had a CXR that showed loculated effusion so Dr. Nolasco send her for admission and work up.   Chest CT in [facility] showed multiple loculations in right lung. After flu pneumonia and empyema could happen especially with staph.     Pneumonia  Loculated pleural effusion    - Blood cultures x 2  - Sputum culture  - Procalcitonin  - WBC and Tm trend   - IR or CT surgery for pleural fluid drainage, please send for culture and fluid analysis  - Agree with cefepime 2gm daily   - Agree with vancomycin 1gm post dialysis  - Trough predialysis between 15 and 20  - Will switch ABx regimen based on culture results.     Will follow.

## 2019-02-10 NOTE — PROGRESS NOTE ADULT - ASSESSMENT
ESRD = HD am; No obv fluid overload or Pul edema; Offered HD now but wants tomorrow; Unclear if we can remove too much fluid  Hyperkalemia - mild - recd meds  Infiltrat Lung/Loc Pl Eff - Pulm F/U

## 2019-02-10 NOTE — DISCHARGE NOTE ADULT - CARE PROVIDER_API CALL
Arleth Lopez Luzdelcarmen M (MD)  Family Medicine  [address] [place] [address]  Phone: [phone]  Fax: [phone]  Follow Up Time:  Phone: (   )    -  Fax: (   )    -  Follow Up Time:     Ismael Larsen)  Internal Medicine; Nephrology  [address] [place] [address]  Phone: [phone]  Fax: [phone]  Follow Up Time: Ismael Larsen)  Internal Medicine; Nephrology  [address] [facility], [place] [address]  Phone: [phone]  Fax: [phone]  Follow Up Time:     Arleth Lopez Luzdelcarmen M (MD)  Family Medicine  [address] [place] [address]  Phone: [phone]  Fax: [phone]  Follow Up Time:  Phone: (   )    -  Fax: (   )    -  Follow Up Time:     Milton Velez (DO)  Critical Care Medicine; Internal Medicine; Pulmonary Disease  [address] [facility], [address]  [place] [address]  Phone: [phone]  Fax: [phone]  Follow Up Time: Ismael Larsen)  Internal Medicine; Nephrology  [address] [facility], [place] [address]  Phone: [phone]  Fax: [phone]  Follow Up Time:     Milton Velez (DO)  Critical Care Medicine; Internal Medicine; Pulmonary Disease  [address] [facility], [address]  [place] [address]  Phone: [phone]  Fax: [phone]  Follow Up Time:     Arleth Lopez Luzdelcarmen M (MD)  Family Medicine  [address] [place] [address]  Phone: [phone]  Fax: [phone]  Follow Up Time:  Phone: (   )    -  Fax: (   )    -  Follow Up Time:     Colt Orozco)  Cardiac Electrophysiology; Cardiology; Internal Medicine  [address] [place] [id number]  Phone: [phone]  Fax: [phone]  Follow Up Time:

## 2019-02-10 NOTE — DISCHARGE NOTE ADULT - CARE PLAN
Principal Discharge DX:	Pleural effusion  Secondary Diagnosis:	Chronic systolic congestive heart failure  Secondary Diagnosis:	DM (diabetes mellitus)  Secondary Diagnosis:	HLD (hyperlipidemia)  Secondary Diagnosis:	Essential hypertension Principal Discharge DX:	Volume overload  Goal:	Continue with  hemodialysis as scheduled, follow up with Pulmonologist, nephrologist and primary care doctor as outpatient.  Assessment and plan of treatment:	Having too much water in your body is called fluid overload or hypervolemia. One of the main functions of the kidneys is to balance fluid in the body. If too much fluid builds up in your body, it can have harmful effects on your health, such as difficulty breathing and swelling. You were found to have excesive fluid in your legs, around your lungs  Secondary Diagnosis:	Pleural effusion  Goal:	Continue with  hemodialysis as scheduled, follow up with Pulmonologist as outpatient.  Secondary Diagnosis:	Ascites  Goal:	Continue with hemodialysis as scheduled, Follow up results of Biopsy with nephrologist.  Assessment and plan of treatment:	***FOLLOW UP RESULTS OF CYTOLOGY WITH NEPHROLOGIST. ****  Ascites is excess fluid in the belly. The fluid causes swelling in the belly. Most people who develop ascites develop a large belly and experience a rapid gain in weight. Some people also develop swelling of the ankles and shortness of breath. You were noted to have fluid free in your belly by physical exam and was confirmed by Ct scan and ultrasound of your abdomen. You were examined by intervention radiology and a paracentesis was performed. Paracentesis is a procedure that is done to remove fluid from the abdomen. A sample of that fluid was sent to the pathologist for analysis. It is probable that the fluid is secondary to buildup of fluid due to your heart and kidney problems. As mentioned above, you need to follow up with your primary care doctor and nephrologist for further management.  Secondary Diagnosis:	ESRD on hemodialysis  Goal:	Continue with hemodialysis as scheduled.  Secondary Diagnosis:	Chronic systolic congestive heart failure  Goal:	prevent further episodes, take your medication and prescribed, follow up with primary care doctor.  Secondary Diagnosis:	HTN (hypertension)  Goal:	Blood pressure <140/ 90,  take your medication as prescribed, follow up with primary care doctor.  Assessment and plan of treatment:	Your blood pressure was controlled during your hospitalization.  You need to take your medication as prescribed, and follow a low sodium diet. Follow up with your Primary Care Doctor to continue having your blood pressure checked on a continual basis.  Secondary Diagnosis:	DM (diabetes mellitus)  Goal:	HbA1c <7, take your medication as prescribed, follow up with primary care doctor.  Assessment and plan of treatment:	Follow a low carbohydrate diet,  low  on sugars and fat. Continue to take insulin as prescribed. Follow up with your Primary Care Doctor regularly for blood sugar/A1c checks. Be sure to see an eye doctor and foot doctor annually and follow up with your primary care doctor within 1 week from your discharge for further management. Principal Discharge DX:	Volume overload  Goal:	Continue with  hemodialysis as scheduled, follow up with Pulmonologist, nephrologist and primary care doctor as outpatient.  Assessment and plan of treatment:	Having too much water in your body is called fluid overload or hypervolemia. One of the main functions of the kidneys is to balance fluid in the body. If too much fluid builds up in your body, it can have harmful effects on your health, such as difficulty breathing and swelling. You were found to have excessive fluid in your legs, around your lungs and in your belly.  Secondary Diagnosis:	Pleural effusion  Goal:	Continue with  hemodialysis as scheduled, follow up with Pulmonologist as outpatient.  Secondary Diagnosis:	Ascites  Goal:	Continue with hemodialysis as scheduled, Follow up results of Biopsy with nephrologist.  Assessment and plan of treatment:	***FOLLOW UP RESULTS OF CYTOLOGY WITH NEPHROLOGIST. ****  Ascites is excess fluid in the belly. The fluid causes swelling in the belly. Most people who develop ascites develop a large belly and experience a rapid gain in weight. Some people also develop swelling of the ankles and shortness of breath. You were noted to have fluid free in your belly by physical exam and was confirmed by Ct scan and ultrasound of your abdomen. You were examined by intervention radiology and a paracentesis was performed. Paracentesis is a procedure that is done to remove fluid from the abdomen. A sample of that fluid was sent to the pathologist for analysis. It is probable that the fluid is secondary to buildup of fluid due to your heart and kidney problems. As mentioned above, you need to follow up with your primary care doctor and nephrologist for further management.  Secondary Diagnosis:	ESRD on hemodialysis  Goal:	Continue with hemodialysis as scheduled.  Secondary Diagnosis:	Chronic systolic congestive heart failure  Goal:	prevent further episodes, take your medication and prescribed, follow up with primary care doctor.  Secondary Diagnosis:	HTN (hypertension)  Goal:	Blood pressure <140/ 90,  take your medication as prescribed, follow up with primary care doctor.  Assessment and plan of treatment:	Your blood pressure was controlled during your hospitalization.  You need to take your medication as prescribed, and follow a low sodium diet. Follow up with your Primary Care Doctor to continue having your blood pressure checked on a continual basis.  Secondary Diagnosis:	DM (diabetes mellitus)  Goal:	HbA1c <7, take your medication as prescribed, follow up with primary care doctor.  Assessment and plan of treatment:	Follow a low carbohydrate diet,  low  on sugars and fat. Continue to take insulin as prescribed. Follow up with your Primary Care Doctor regularly for blood sugar/A1c checks. Be sure to see an eye doctor and foot doctor annually and follow up with your primary care doctor within 1 week from your discharge for further management. Principal Discharge DX:	Volume overload  Goal:	Continue with  hemodialysis as scheduled, follow up with Pulmonologist, nephrologist and primary care doctor as outpatient.  Assessment and plan of treatment:	Having too much water in your body is called fluid overload or hypervolemia. One of the main functions of the kidneys is to balance fluid in the body. If too much fluid builds up in your body, it can have harmful effects on your health, such as difficulty breathing and swelling. You were found to have excessive fluid in your legs, around your lungs and in your belly.  Secondary Diagnosis:	Pleural effusion  Goal:	Continue with  hemodialysis as scheduled, follow up with Pulmonologist as outpatient.  Assessment and plan of treatment:	During this hospitalization, you were found to have a pleural effusion, which means you have excess fluid buildup in the space between the layers of the pleura. The pleura are thin layers of tissue that form a 2-layered lining around the lungs. One layer of the pleura rests directly on the lungs. The other layer rests on the chest wall. There is normally a small amount of fluid called pleural fluid between these layers.     Do not smoke , and do not allow others to smoke around you.    Deep breathing and coughing will decrease your risk for a lung infection. Take a deep breath and hold it for as long as you can. Let the air out and then cough strongly. Deep breaths help open your airway. and take a slow, deep breath. Then let the air out and cough. Repeat these steps 10 times every hour.    If you experience coughing, holding a pillow over your chest when you cough may help decrease the pain.    Call your doctor if you are coughing up blood, have chest pain, shortness of breath or a fever of 100.4.  Secondary Diagnosis:	Ascites  Goal:	Continue with hemodialysis as scheduled, Follow up results of Biopsy with nephrologist.  Assessment and plan of treatment:	***FOLLOW UP RESULTS OF CYTOLOGY . ****  Ascites is excess fluid in the belly. The fluid causes swelling in the belly. Most people who develop ascites develop a large belly and experience a rapid gain in weight. Some people also develop swelling of the ankles and shortness of breath. You were noted to have fluid free in your belly by physical exam and was confirmed by Ct scan and ultrasound of your abdomen. You were examined by intervention radiology and a paracentesis was performed. Paracentesis is a procedure that is done to remove fluid from the abdomen. A sample of that fluid was sent to the pathologist for analysis. It is probable that the fluid is secondary to buildup of fluid due to your heart and kidney problems. As mentioned above, you need to follow up with your primary care doctor and nephrologist for further management.  Secondary Diagnosis:	ESRD on hemodialysis  Goal:	Continue with hemodialysis as scheduled.  Assessment and plan of treatment:	Continue following a renal diet as advised by your Nephrologist. If you have been started on any medications for your kidney function, be sure to take them regularly. Be sure to follow up and have dialysis as scheduled, if it very important that you do not miss sessions! If something comes up and you feel that you cannot make a session, please alert your nephrologist immediately.  Secondary Diagnosis:	Chronic systolic congestive heart failure  Goal:	prevent further episodes, take your medication and prescribed, follow up with primary care doctor and cardiologist  Assessment and plan of treatment:	You have  history of heart failure. Upon your admission it was noticed that the amount of fluid your heart is pumping has reduced, this contributed to the fluid overload you presented. A echo of your heart was done during this admission and a structure growing in one of the valves of your heart was seen. This image was seen on a previous study you had and has not change, you need to follow up with your cardiologist for further management. Take all your medication as indicated.     Heart failure is a condition in which the heart can't pump enough blood to meet the body's needs. Heart failure does not mean that your heart has stopped or is about to stop working. It means that your heart is not able to pump blood the way it should. It can affect one or both sides of the heart.    The weakening of the heart's pumping ability causes  •Blood and fluid to back up into the lungs  •The buildup of fluid in the feet, ankles and legs - called edema  •Tiredness and shortness of breath    Follow up with your primary care doctor and Cardiologist within 1 week from your discharge.  Secondary Diagnosis:	HTN (hypertension)  Goal:	Blood pressure <140/ 90,  take your medication as prescribed, follow up with primary care doctor.  Assessment and plan of treatment:	Your blood pressure was controlled during your hospitalization.  You need to take your medication as prescribed, and follow a low sodium diet. Follow up with your Primary Care Doctor to continue having your blood pressure checked on a continual basis.  Secondary Diagnosis:	DM (diabetes mellitus)  Goal:	HbA1c <7, take your medication as prescribed, follow up with primary care doctor.  Assessment and plan of treatment:	Follow a low carbohydrate diet,  low  on sugars and fat. Continue to take insulin as prescribed. Follow up with your Primary Care Doctor regularly for blood sugar/A1c checks. Be sure to see an eye doctor and foot doctor annually and follow up with your primary care doctor within 1 week from your discharge for further management. Principal Discharge DX:	Volume overload  Goal:	Continue with  hemodialysis as scheduled, follow up with Pulmonologist, nephrologist and primary care doctor as outpatient.  Assessment and plan of treatment:	Having too much water in your body is called fluid overload or hypervolemia. One of the main functions of the kidneys is to balance fluid in the body. If too much fluid builds up in your body, it can have harmful effects on your health, such as difficulty breathing and swelling. You were found to have excessive fluid in your legs, around your lungs and in your belly.  Secondary Diagnosis:	Pleural effusion  Goal:	Continue with  hemodialysis as scheduled, follow up with Pulmonologist as outpatient.  Assessment and plan of treatment:	During this hospitalization, you were found to have a pleural effusion, which means you have excess fluid buildup in the space between the layers of the pleura. The pleura are thin layers of tissue that form a 2-layered lining around the lungs. One layer of the pleura rests directly on the lungs. The other layer rests on the chest wall. There is normally a small amount of fluid called pleural fluid between these layers.     Do not smoke , and do not allow others to smoke around you.    Deep breathing and coughing will decrease your risk for a lung infection. Take a deep breath and hold it for as long as you can. Let the air out and then cough strongly. Deep breaths help open your airway. and take a slow, deep breath. Then let the air out and cough. Repeat these steps 10 times every hour.    If you experience coughing, holding a pillow over your chest when you cough may help decrease the pain.    Call your doctor if you are coughing up blood, have chest pain, shortness of breath or a fever of 100.4.  Secondary Diagnosis:	Ascites  Goal:	Continue with hemodialysis as scheduled, Follow up results of Biopsy with nephrologist.  Assessment and plan of treatment:	***FOLLOW UP RESULTS OF CYTOLOGY . ****  Ascites is excess fluid in the belly. The fluid causes swelling in the belly. Most people who develop ascites develop a large belly and experience a rapid gain in weight. Some people also develop swelling of the ankles and shortness of breath. You were noted to have fluid free in your belly by physical exam and was confirmed by Ct scan and ultrasound of your abdomen. You were examined by intervention radiology and a paracentesis was performed. Paracentesis is a procedure that is done to remove fluid from the abdomen. A sample of that fluid was sent to the pathologist for analysis. It is probable that the fluid is secondary to buildup of fluid due to your heart and kidney problems. As mentioned above, you need to follow up with your primary care doctor and nephrologist for further management.  Secondary Diagnosis:	ESRD on hemodialysis  Goal:	Continue with hemodialysis as scheduled.  Assessment and plan of treatment:	Continue following a renal diet as advised by your Nephrologist. If you have been started on any medications for your kidney function, be sure to take them regularly. Be sure to follow up and have dialysis as scheduled, if it very important that you do not miss sessions! If something comes up and you feel that you cannot make a session, please alert your nephrologist immediately.  Secondary Diagnosis:	Chronic systolic congestive heart failure  Goal:	. You have acute on chronic combined heart failure. prevent further episodes, take your medication and prescribed, follow up with primary care doctor and cardiologist  Assessment and plan of treatment:	You have  history of heart failure. Upon your admission it was noticed that the amount of fluid your heart is pumping has reduced, this contributed to the fluid overload you presented. A echo of your heart was done during this admission and a structure growing in one of the valves of your heart was seen. This image was seen on a previous study you had and has not change, you need to follow up with your cardiologist for further management. Take all your medication as indicated.     Heart failure is a condition in which the heart can't pump enough blood to meet the body's needs. Heart failure does not mean that your heart has stopped or is about to stop working. It means that your heart is not able to pump blood the way it should. It can affect one or both sides of the heart.    The weakening of the heart's pumping ability causes  •Blood and fluid to back up into the lungs  •The buildup of fluid in the feet, ankles and legs - called edema  •Tiredness and shortness of breath    Follow up with your primary care doctor and Cardiologist within 1 week from your discharge.  Secondary Diagnosis:	HTN (hypertension)  Goal:	Blood pressure <140/ 90,  take your medication as prescribed, follow up with primary care doctor.  Assessment and plan of treatment:	Your blood pressure was controlled during your hospitalization.  You need to take your medication as prescribed, and follow a low sodium diet. Follow up with your Primary Care Doctor to continue having your blood pressure checked on a continual basis.  Secondary Diagnosis:	DM (diabetes mellitus)  Goal:	HbA1c <7, take your medication as prescribed, follow up with primary care doctor. yours is 6.2  Assessment and plan of treatment:	Follow a low carbohydrate diet,  low  on sugars and fat. Continue to take insulin as prescribed. Follow up with your Primary Care Doctor regularly for blood sugar/A1c checks. Be sure to see an eye doctor and foot doctor annually and follow up with your primary care doctor within 1 week from your discharge for further management. Principal Discharge DX:	Volume overload  Goal:	Continue with  hemodialysis as scheduled, follow up with Pulmonologist, nephrologist and primary care doctor as outpatient.  Assessment and plan of treatment:	Having too much water in your body is called fluid overload or hypervolemia. One of the main functions of the kidneys is to balance fluid in the body. If too much fluid builds up in your body, it can have harmful effects on your health, such as difficulty breathing and swelling. You were found to have excessive fluid in your legs, around your lungs and in your belly.  Secondary Diagnosis:	Pleural effusion  Goal:	Continue with  hemodialysis as scheduled, follow up with Pulmonologist as outpatient.  Assessment and plan of treatment:	During this hospitalization, you were found to have a pleural effusion, which means you have excess fluid buildup in the space between the layers of the pleura. The pleura are thin layers of tissue that form a 2-layered lining around the lungs. One layer of the pleura rests directly on the lungs. The other layer rests on the chest wall. There is normally a small amount of fluid called pleural fluid between these layers.     Do not smoke , and do not allow others to smoke around you.    Deep breathing and coughing will decrease your risk for a lung infection. Take a deep breath and hold it for as long as you can. Let the air out and then cough strongly. Deep breaths help open your airway. and take a slow, deep breath. Then let the air out and cough. Repeat these steps 10 times every hour.    If you experience coughing, holding a pillow over your chest when you cough may help decrease the pain.    Call your doctor if you are coughing up blood, have chest pain, shortness of breath or a fever of 100.4.  Secondary Diagnosis:	Ascites  Goal:	Continue with hemodialysis as scheduled, Follow up results of Biopsy with nephrologist.  Assessment and plan of treatment:	***FOLLOW UP RESULTS OF CYTOLOGY . ****  Ascites is excess fluid in the belly. The fluid causes swelling in the belly. Most people who develop ascites develop a large belly and experience a rapid gain in weight. Some people also develop swelling of the ankles and shortness of breath. You were noted to have fluid free in your belly by physical exam and was confirmed by Ct scan and ultrasound of your abdomen. You were examined by intervention radiology and a paracentesis was performed. Paracentesis is a procedure that is done to remove fluid from the abdomen. A sample of that fluid was sent to the pathologist for analysis. It is probable that the fluid is secondary to buildup of fluid due to your heart and kidney problems. As mentioned above, you need to follow up with your primary care doctor and nephrologist for further management.  Secondary Diagnosis:	ESRD on hemodialysis  Goal:	Continue with hemodialysis as scheduled.  Assessment and plan of treatment:	Continue following a renal diet as advised by your Nephrologist. If you have been started on any medications for your kidney function, be sure to take them regularly. Be sure to follow up and have dialysis as scheduled, if it very important that you do not miss sessions! If something comes up and you feel that you cannot make a session, please alert your nephrologist immediately.  Secondary Diagnosis:	Chronic combined systolic and diastolic congestive heart failure  Goal:	. You have acute on chronic combined heart failure. prevent further episodes, take your medication and prescribed, follow up with primary care doctor and cardiologist  Assessment and plan of treatment:	You have  history of heart failure. Upon your admission it was noticed that the amount of fluid your heart is pumping has reduced, this contributed to the fluid overload you presented. A echo of your heart was done during this admission and a structure growing in one of the valves of your heart was seen. This image was seen on a previous study you had and has not change, you need to follow up with your cardiologist for further management. Take all your medication as indicated.     Heart failure is a condition in which the heart can't pump enough blood to meet the body's needs. Heart failure does not mean that your heart has stopped or is about to stop working. It means that your heart is not able to pump blood the way it should. It can affect one or both sides of the heart.    The weakening of the heart's pumping ability causes  •Blood and fluid to back up into the lungs  •The buildup of fluid in the feet, ankles and legs - called edema  •Tiredness and shortness of breath    Follow up with your primary care doctor and Cardiologist within 1 week from your discharge.  Secondary Diagnosis:	Hypertension, unspecified type  Goal:	Blood pressure <140/ 90,  take your medication as prescribed, follow up with primary care doctor.  Assessment and plan of treatment:	Your blood pressure was controlled during your hospitalization.  You need to take your medication as prescribed, and follow a low sodium diet. Follow up with your Primary Care Doctor to continue having your blood pressure checked on a continual basis.  Secondary Diagnosis:	Type 2 diabetes mellitus with hypoglycemia without coma, with long-term current use of insulin  Goal:	HbA1c <7, take your medication as prescribed, follow up with primary care doctor. yours is 6.2  Assessment and plan of treatment:	Follow a low carbohydrate diet,  low  on sugars and fat. Continue to take insulin as prescribed. Follow up with your Primary Care Doctor regularly for blood sugar/A1c checks. Be sure to see an eye doctor and foot doctor annually and follow up with your primary care doctor within 1 week from your discharge for further management.

## 2019-02-10 NOTE — DISCHARGE NOTE ADULT - PROVIDER TOKENS
FREE:[LAST:[John],FIRST:[Arleth],PHONE:[(   )    -],FAX:[(   )    -],ADDRESS:[Camille Lopez (MD)  Family Medicine  [address] [place] [address]  Phone: [phone]  Fax: [phone]  Follow Up Time:]],PROVIDER:[TOKEN:[[id number]]] PROVIDER:[TOKEN:[[id number]]],FREE:[LAST:[John],FIRST:[Arleth],PHONE:[(   )    -],FAX:[(   )    -],ADDRESS:[Camille Lopez (MD)  Family Medicine  [address] [place] [address]  Phone: [phone]  Fax: [phone]  Follow Up Time:]],PROVIDER:[TOKEN:[[id number]]] PROVIDER:[TOKEN:[[id number]]],PROVIDER:[TOKEN:[[id number]]],FREE:[LAST:[John],FIRST:[Arleth],PHONE:[(   )    -],FAX:[(   )    -],ADDRESS:[Camille Lopez)  Family Medicine  [address] [place] [address]  Phone: [phone]  Fax: [phone]  Follow Up Time:]],PROVIDER:[TOKEN:[[id number]]]

## 2019-02-10 NOTE — PROGRESS NOTE ADULT - SUBJECTIVE AND OBJECTIVE BOX
Patient seen and examined    Feels fine  no c/o CP + SOB - NV   mild swelling feet    Vital Signs Last 24 Hrs  T(C): 37.1 (10 Feb 2019 08:00), Max: 37.1 (10 Feb 2019 08:00)  T(F): 98.8 (10 Feb 2019 08:00), Max: 98.8 (10 Feb 2019 08:00)  HR: 92 (10 Feb 2019 08:00) (92 - 105)  BP: 147/88 (10 Feb 2019 08:00) (147/88 - 157/101)  BP(mean): --  RR: 22 (10 Feb 2019 08:00) (22 - 24)  SpO2: 95% (09 Feb 2019 19:57) (95% - 95%)    PHYSICAL EXAM    GENERAL: NAD,   EYES:  conjunctiva and sclera clear  NECK: Supple, No JVD/Bruit  NERVOUS SYSTEM:  A/O x3,   CHEST:  Few post rales, No rhonchi, poor BS bases  HEART:  RRR, No murmur  ABDOMEN: Soft, NT/ND BS+  EXTREMITIES:  tr Edema;  SKIN: No rashes    10 Feb 2019 07:15    135    |  95     |  26.0   ----------------------------<  170    5.6     |  25.0   |  4.46     Ca    8.9        10 Feb 2019 07:15  Phos  4.1       10 Feb 2019 07:15  Mg     2.3       10 Feb 2019 07:15    TPro  7.4    /  Alb  3.1    /  TBili  0.5    /  DBili  x      /  AST  14     /  ALT  5      /  AlkPhos  140    09 Feb 2019 15:12                          10.7   4.3   )-----------( 195      ( 10 Feb 2019 07:15 )             37.1       Chest CT - Infiltrate + Locuolated R Pl eff+ Tr L eff

## 2019-02-10 NOTE — PROGRESS NOTE ADULT - SUBJECTIVE AND OBJECTIVE BOX
Patient is a 58y old  Female who presents with a chief complaint of sent from pulmonology office (09 Feb 2019 23:29)    INTERVAL HPI/OVERNIGHT EVENTS:  Patient seen and examined at bedside. Overnight patient vomited x 1. Patient reports good appetite, tolerating PO intake but said that didn't really eat because her stomach heurts, voiding last BM yesterday     ROS: Denies CP, SOB, HAYES, diarrhea/constipation, fever chills, LE pain.    T(C): 37 (02-10-19 @ 05:15), Max: 37 (02-10-19 @ 05:15)  HR: 105 (02-10-19 @ 05:15) (91 - 105)  BP: 157/101 (02-10-19 @ 05:15) (148/88 - 162/100)  RR: 22 (02-10-19 @ 05:15) (18 - 28)  SpO2: 95% (02-09-19 @ 19:57) (91% - 97%) on 3L via NC      Daily Height in cm: 152.4 (09 Feb 2019 09:59)    Daily   BMI (kg/m2): 29.3 (02-09 @ 09:59)      Telemonitor: NSR    	General: Elderly women Well nourished/Well developed, NAD  	Head:  Normocephalic, atraumatic  	ENT:  Mucosa moist, no ulcerations  	Neck:  Supple, no sinuses or palpable masses  	Respiratory: Right lower crackles. No wheezing, rest CTA B/L  	CV: tachycardic, regular rhythm S1S2, no murmur  	GI: Abdomen with generalized tenderness, no rebound tenderness, NT, ND no palpable mass  	Extremities: + 2 pitting b/l leg edema, + peripheral pulses, FROM all 4 extremity. Left arm with A/V fistula noted, thrill palpated, non tender  Neurology: A&Ox3, no focalization signsLABS:                        11.1   4.3   )-----------( 219      ( 09 Feb 2019 13:03 )             38.5     02-09    136  |  96<L>  |  23.0<H>  ----------------------------<  182<H>  5.4<H>   |  27.0  |  3.69<H>    Ca    9.1      09 Feb 2019 15:12    TPro  7.4  /  Alb  3.1<L>  /  TBili  0.5  /  DBili  x   /  AST  14  /  ALT  5   /  AlkPhos  140<H>  02-09    PT/INR - ( 09 Feb 2019 13:03 )   PT: 13.7 sec;   INR: 1.19 ratio         PTT - ( 09 Feb 2019 13:03 )  PTT:31.3 sec    CAPILLARY BLOOD GLUCOSE      POCT Blood Glucose.: 179 mg/dL (09 Feb 2019 21:17)            RADIOLOGY & ADDITIONAL TESTS:      EXAM:  CT ABDOMEN AND PELVIS                      \  EXAM:  CT CHEST        PROCEDURE DATE:  02/09/2019    	INTERPRETATION:  Clinical Information: Dyspnea  	Comparison: 12/31/2018  	Procedure:  	CT of the Chest, Abdomen andPelvis was performed without intravenous contrast.   	Intravenous contrast: None.  	Oral contrast: None.  	Sagittal and coronal reformats were performed.  	Findings:  	Chest:  	Airways, pleura, lungs: Central airways patent. Trace left pleural effusion. Small-moderate loculated right pleural effusion. And perihilar consolidation of unclear etiology. Interlobular septal thickening on the right of unclear etiology.  	Vasculature: Unremarkable.  	Mediastinum and vishal: Severe cardiomegaly. Pericardium and esophagus unremarkable.  	Chest wall and imaged neck: Unremarkable.  	Neuromusculoskeletal: Unremarkable.  	Abdomen/pelvis:   	Hepatobiliary: The liver, gallbladder, and biliary tree are unremarkable  	Spleen: Unremarkable  	Pancreas: Unremarkable  	Adrenal glands: Unremarkable  	Urinary: The kidneys, ureters, and urinary bladder are unremarkable  	Reproductive: The uterus and adnexa are unremarkable.   Gastrointestinal: Very limited evaluation of the bowel due to lack of intravenous contrast, oral    contrast, and presence of ascites which obscures the fat/bowel interface. No bowel obstruction.  	Peritoneum: Small-moderate ascites. No free air. Vasculature: Unremarkable  	Retroperitoneum: Unremarkable  	Abdominal wall: Unremarkable  	Neuromusculoskeletal: Unremarkable  	Impression:   	-Right perihilar consolidation and right interlobular septal thickening; differential diagnosis includes malignancy and pneumonia.  	-Small-moderate right pleural effusion is loculated, differential diagnosis includes malignancy, parapneumonic effusion, or empyema.  	-Severe global cardiomegaly.  	-Small-moderate ascites, unclear etiology.  	LUIS ARMANDO CARRERA M.D., ATTENDING RADIOLOGIST  	This document has been electronically signed. Feb 9 2019  1:11PM  	     	< from: Xray Chest 1 View- PORTABLE-Urgent (02.09.19 @ 11:14) >  	 EXAM:  XR CHEST PORTABLE URGENT 1V                        	PROCEDURE DATE:  02/09/2019    	INTERPRETATION:  Clinical Information: Shortness of breath.  	Technique: AP chest image.   	Comparison: 01/05/2019  	Impression: Cardiomegaly. Bilateral patchy opacities are significantly changed compared to prior, representing multifocal pneumonia or pulmonary edema.  	LUIS ARMANDO CARRERA M.D., ATTENDING RADIOLOGIST  This document has been electronically signed. Feb 9 2019 11:46AM          MEDICATIONS  (STANDING):  amLODIPine   Tablet 2.5 milliGRAM(s) Oral daily  aspirin enteric coated 81 milliGRAM(s) Oral daily  azithromycin  IVPB 500 milliGRAM(s) IV Intermittent every 24 hours  calcitriol   Capsule 0.25 MICROGram(s) Oral daily  carvedilol 12.5 milliGRAM(s) Oral every 12 hours  cefTRIAXone   IVPB 1 Gram(s) IV Intermittent every 24 hours  dextrose 5%. 1000 milliLiter(s) (50 mL/Hr) IV Continuous <Continuous>  dextrose 50% Injectable 12.5 Gram(s) IV Push once  dextrose 50% Injectable 25 Gram(s) IV Push once  dextrose 50% Injectable 25 Gram(s) IV Push once  docusate sodium 100 milliGRAM(s) Oral two times a day  ferrous    sulfate 325 milliGRAM(s) Oral daily  heparin  Injectable 5000 Unit(s) SubCutaneous every 8 hours  insulin lispro (HumaLOG) corrective regimen sliding scale   SubCutaneous Before meals and at bedtime  pantoprazole    Tablet 40 milliGRAM(s) Oral before breakfast  polyethylene glycol 3350 17 Gram(s) Oral daily  saccharomyces boulardii 250 milliGRAM(s) Oral two times a day  sacubitril 49 mG/valsartan 51 mG 1 Tablet(s) Oral two times a day  senna 2 Tablet(s) Oral at bedtime    MEDICATIONS  (PRN):  dextrose 40% Gel 15 Gram(s) Oral once PRN Blood Glucose LESS THAN 70 milliGRAM(s)/deciliter  glucagon  Injectable 1 milliGRAM(s) IntraMuscular once PRN Glucose LESS THAN 70 milligrams/deciliter        Assessment:  T(C): 37 (02-10-19 @ 05:15), Max: 37 (02-10-19 @ 05:15)  HR: 105 (02-10-19 @ 05:15) (91 - 105)  BP: 157/101 (02-10-19 @ 05:15) (148/88 - 162/100)  RR: 22 (02-10-19 @ 05:15) (18 - 28)  SpO2: 95% (02-09-19 @ 19:57) (91% - 97%)  Wt(kg): -- year old F/M with PMH of        Plan:        Prophylactic measure:  DVT: Ambulation as tolerated, pneumatic compression boots/ Lovenox   C diff: Florastor while patient is on antibiotics

## 2019-02-10 NOTE — DISCHARGE NOTE ADULT - ADDITIONAL INSTRUCTIONS
Continue a DASH (Dietary Approaches to Stop Hypertension) diet. Include more fruits, vegetables, whole grains, and low-fat dairy. Reduce intake of added sugars, solid fats, refined grains, and sodium. Limit foods that are high in saturated fat, such as fatty meats, full-fat dairy products, and tropical oils such as coconut, palm kernel, and palm oils. Limit sugar-sweetened beverages and sweets. When following the DASH eating plan, it is important to choose foods that are:  Low in saturated and trans fats  Rich in potassium, calcium, magnesium, fiber, and protein  Lower in sodium Please follow up with your primary care doctor in the next 7 days after been discharge  Continue with your hemodialysis three times per week  Take all your medications as prescribe   Do not drink more than 1 lit of fluids per day --> Follow up with primary care doctor within 1 week from discharge. (Information bellow)  --> Follow up with Pulmonologist within 1 week from discharge. (Information bellow)  --> Follow up with Nephrologist within 1 week from discharge. Follow results of Paracentesis study as outpatient.  (Information bellow)  --> Continue with your hemodialysis three times per week as previously scheduled (Monday, Wednesday, Friday)   --> Take all your medications as prescribed.  --> Do not drink more than 1 lit of fluids per day --> Follow up with primary care doctor within 1 week from discharge. Follow up results of paracentesis with your primary care doctor. (Information bellow)  --> Follow up with your cardiologist within 1 week from discharge. (Information bellow)  --> Follow up with Pulmonologist within 1 week from discharge. (Information bellow)  --> Follow up with Nephrologist within 1 week from discharge.  (Information bellow)  --> Continue with your hemodialysis three times per week as previously scheduled (Monday, Wednesday, Friday)   --> Take all your medications as prescribed.  --> Do not drink more than 1 lit of fluids per day

## 2019-02-11 LAB
ANION GAP SERPL CALC-SCNC: 15 MMOL/L — SIGNIFICANT CHANGE UP (ref 5–17)
BUN SERPL-MCNC: 32 MG/DL — HIGH (ref 8–20)
CALCIUM SERPL-MCNC: 8.7 MG/DL — SIGNIFICANT CHANGE UP (ref 8.6–10.2)
CHLORIDE SERPL-SCNC: 92 MMOL/L — LOW (ref 98–107)
CO2 SERPL-SCNC: 27 MMOL/L — SIGNIFICANT CHANGE UP (ref 22–29)
CREAT SERPL-MCNC: 5.2 MG/DL — HIGH (ref 0.5–1.3)
ERYTHROCYTE [SEDIMENTATION RATE] IN BLOOD: 22 MM/HR — HIGH (ref 0–20)
GLUCOSE BLDC GLUCOMTR-MCNC: 88 MG/DL — SIGNIFICANT CHANGE UP (ref 70–99)
GLUCOSE SERPL-MCNC: 80 MG/DL — SIGNIFICANT CHANGE UP (ref 70–115)
HCT VFR BLD CALC: 35.9 % — LOW (ref 37–47)
HGB BLD-MCNC: 10.7 G/DL — LOW (ref 12–16)
HIV 1 & 2 AB SERPL IA.RAPID: SIGNIFICANT CHANGE UP
MAGNESIUM SERPL-MCNC: 2.3 MG/DL — SIGNIFICANT CHANGE UP (ref 1.6–2.6)
MCHC RBC-ENTMCNC: 27.6 PG — SIGNIFICANT CHANGE UP (ref 27–31)
MCHC RBC-ENTMCNC: 29.8 G/DL — LOW (ref 32–36)
MCV RBC AUTO: 92.5 FL — SIGNIFICANT CHANGE UP (ref 81–99)
PHOSPHATE SERPL-MCNC: 4.8 MG/DL — HIGH (ref 2.4–4.7)
PLATELET # BLD AUTO: 184 K/UL — SIGNIFICANT CHANGE UP (ref 150–400)
POTASSIUM SERPL-MCNC: 4.9 MMOL/L — SIGNIFICANT CHANGE UP (ref 3.5–5.3)
POTASSIUM SERPL-SCNC: 4.9 MMOL/L — SIGNIFICANT CHANGE UP (ref 3.5–5.3)
PROCALCITONIN SERPL-MCNC: 0.2 NG/ML — HIGH (ref 0–0.04)
RBC # BLD: 3.88 M/UL — LOW (ref 4.4–5.2)
RBC # FLD: 16.5 % — HIGH (ref 11–15.6)
RHEUMATOID FACT SERPL-ACNC: <10 IU/ML — SIGNIFICANT CHANGE UP (ref 0–13)
SODIUM SERPL-SCNC: 134 MMOL/L — LOW (ref 135–145)
WBC # BLD: 3.9 K/UL — LOW (ref 4.8–10.8)
WBC # FLD AUTO: 3.9 K/UL — LOW (ref 4.8–10.8)

## 2019-02-11 PROCEDURE — 71046 X-RAY EXAM CHEST 2 VIEWS: CPT | Mod: 26

## 2019-02-11 PROCEDURE — 99232 SBSQ HOSP IP/OBS MODERATE 35: CPT | Mod: GC

## 2019-02-11 PROCEDURE — 99232 SBSQ HOSP IP/OBS MODERATE 35: CPT

## 2019-02-11 PROCEDURE — ZZZZZ: CPT

## 2019-02-11 PROCEDURE — 99233 SBSQ HOSP IP/OBS HIGH 50: CPT

## 2019-02-11 RX ORDER — HEPARIN SODIUM 5000 [USP'U]/ML
5000 INJECTION INTRAVENOUS; SUBCUTANEOUS ONCE
Qty: 0 | Refills: 0 | Status: COMPLETED | OUTPATIENT
Start: 2019-02-11 | End: 2019-02-11

## 2019-02-11 RX ADMIN — CARVEDILOL PHOSPHATE 12.5 MILLIGRAM(S): 80 CAPSULE, EXTENDED RELEASE ORAL at 06:53

## 2019-02-11 RX ADMIN — CALCITRIOL 0.25 MICROGRAM(S): 0.5 CAPSULE ORAL at 12:44

## 2019-02-11 RX ADMIN — Medication 325 MILLIGRAM(S): at 12:44

## 2019-02-11 RX ADMIN — Medication 250 MILLIGRAM(S): at 06:53

## 2019-02-11 RX ADMIN — HEPARIN SODIUM 5000 UNIT(S): 5000 INJECTION INTRAVENOUS; SUBCUTANEOUS at 13:37

## 2019-02-11 RX ADMIN — HEPARIN SODIUM 5000 UNIT(S): 5000 INJECTION INTRAVENOUS; SUBCUTANEOUS at 21:47

## 2019-02-11 RX ADMIN — LIDOCAINE 1 PATCH: 4 CREAM TOPICAL at 12:44

## 2019-02-11 RX ADMIN — Medication 81 MILLIGRAM(S): at 12:44

## 2019-02-11 RX ADMIN — PANTOPRAZOLE SODIUM 40 MILLIGRAM(S): 20 TABLET, DELAYED RELEASE ORAL at 06:53

## 2019-02-11 RX ADMIN — AMLODIPINE BESYLATE 2.5 MILLIGRAM(S): 2.5 TABLET ORAL at 06:53

## 2019-02-11 RX ADMIN — CEFEPIME 100 MILLIGRAM(S): 1 INJECTION, POWDER, FOR SOLUTION INTRAMUSCULAR; INTRAVENOUS at 13:16

## 2019-02-11 RX ADMIN — HEPARIN SODIUM 5000 UNIT(S): 5000 INJECTION INTRAVENOUS; SUBCUTANEOUS at 06:53

## 2019-02-11 RX ADMIN — LIDOCAINE 1 PATCH: 4 CREAM TOPICAL at 20:48

## 2019-02-11 NOTE — PROGRESS NOTE ADULT - SUBJECTIVE AND OBJECTIVE BOX
Pt images reviewed.  ascites and loc pleural effusion.  Would rec paracentesis and possible Rt thora for cx.

## 2019-02-11 NOTE — PROGRESS NOTE ADULT - SUBJECTIVE AND OBJECTIVE BOX
Patient was seen and evaluated on dialysis.   No c/o CP less SOB  - NV  no F/C  Mild swelling    T(C): 36.9 (02-11-19 @ 08:07), Max: 37.1 (02-10-19 @ 15:35)  HR: 72 (02-11-19 @ 08:07) (70 - 88)  BP: 130/59 (02-11-19 @ 08:07) (121/69 - 142/86)  Wt(kg): --  PE ;  NAD  lungs - CTA  CV gr 1 murmer,  No gallop or rub  Abd : soft, NT BS +, No masses  Ext- mild edema  Neuro : Grossly intact, moving extremities                             10.7   3.9   )-----------( 184      ( 11 Feb 2019 06:54 )             35.9        02-11    134<L>  |  92<L>  |  32.0<H>  ----------------------------<  80  4.9   |  27.0  |  5.20<H>    Ca    8.7      11 Feb 2019 06:53  Phos  4.8     02-11  Mg     2.3     02-11    TPro  7.4  /  Alb  3.1<L>  /  TBili  0.5  /  DBili  x   /  AST  14  /  ALT  5   /  AlkPhos  140<H>  02-09      MEDICATIONS  (STANDING):  amLODIPine   Tablet  aspirin enteric coated  calcitriol   Capsule  carvedilol  cefepime   IVPB  dextrose 40% Gel PRN  dextrose 5%.  dextrose 50% Injectable  dextrose 50% Injectable  dextrose 50% Injectable  ferrous    sulfate  glucagon  Injectable PRN  heparin  Injectable  lidocaine   Patch  pantoprazole    Tablet  saccharomyces boulardii  sacubitril 49 mG/valsartan 51 mG  vancomycin  IVPB

## 2019-02-11 NOTE — PROGRESS NOTE ADULT - SUBJECTIVE AND OBJECTIVE BOX
58y Female,   Patient is a 58y old  Female who presents with a chief complaint of sent from pulmonology office (2019 14:08)      HOSPITALIZATION DAY: 2   [facility] 2GUL 2326          INTERVAL/OVERNIGHT EVENTS:    Patient examined at beside. No acute events over night.  As per patient, states felling  better compared to admission but not at her baseline, states the SOB persist .Patient is tolerating po diet w/o nausea/vomiting/diarrhea/abdominal pain. Voiding small quantity of urine as it is her baseline, last  BM  on yesterday. Patient is ambulating from bed to chair.   Patient denies chest pain, calf pain, abdominal pain, headaches, fever, chills, dysuria, hematuria, diarrhea, constipation,  palpitations.   Rest of ROS not contributory except for above.      CARDIAC MONITOR: no events.     I&O's Summary    10 Feb 2019 07:  -  2019 07:00  --------------------------------------------------------  IN: 300 mL / OUT: 0 mL / NET: 300 mL    2019 07:01  -  2019 16:16  --------------------------------------------------------  IN: 0 mL / OUT: 1500 mL / NET: -1500 mL        Daily     Daily Weight in k.1 (2019 08:07)    CAPILLARY BLOOD GLUCOSE      POCT Blood Glucose.: 88 mg/dL (2019 08:20)  POCT Blood Glucose.: 119 mg/dL (10 Feb 2019 22:01)  POCT Blood Glucose.: 119 mg/dL (10 Feb 2019 17:07)  POCT Blood Glucose.: 54 mg/dL (10 Feb 2019 16:22)  POCT Blood Glucose.: 55 mg/dL (10 Feb 2019 16:22)        PHYSICAL EXAM: Vital signs reviewed.  GENERAL: Not in acute distress, tolerating decubitus. Alert and oriented. Cooperative.   HEENT: moist mucous membranes. Clear conjuctiva.   NECK: Supple.  CARDIOVASCULAR: Normal S1 and S2, No murmur, RRR.  RESPIRATORY: Crackles predominantly on R low and middle lobes, and b/l lung bases.   GASTROINTESTINAL: Soft, nontender. Mildly distended.   EXTREMITIES: No cyanosis or edema. Distal pulses wnl. B/l pedal +1 edema.   SKIN: warm and dry .   NEURO: Alert/oriented x 3. No focal signs on exam.     LABS                          10.7   3.9   )-----------( 184      ( 2019 06:54 )             35.9         02-11    134<L>  |  92<L>  |  32.0<H>  ----------------------------<  80  4.9   |  27.0  |  5.20<H>    Ca    8.7      2019 06:53  Phos  4.8     02-  Mg     2.3     02-11        Culture - Blood (collected 2019 13:04)  Source: .Blood  Preliminary Report (2019 15:00):    No growth at 48 hours    Culture - Blood (collected 2019 13:04)  Source: .Blood  Preliminary Report (2019 15:00):    No growth at 48 hours        IMAGING      DIET:    MEDICATIONS  (STANDING):  amLODIPine   Tablet 2.5 milliGRAM(s) Oral daily  aspirin enteric coated 81 milliGRAM(s) Oral daily  calcitriol   Capsule 0.25 MICROGram(s) Oral daily  carvedilol 12.5 milliGRAM(s) Oral every 12 hours  cefepime   IVPB 2000 milliGRAM(s) IV Intermittent daily  dextrose 5%. 1000 milliLiter(s) (50 mL/Hr) IV Continuous <Continuous>  dextrose 50% Injectable 12.5 Gram(s) IV Push once  dextrose 50% Injectable 25 Gram(s) IV Push once  dextrose 50% Injectable 25 Gram(s) IV Push once  ferrous    sulfate 325 milliGRAM(s) Oral daily  heparin  Injectable 5000 Unit(s) SubCutaneous every 8 hours  lidocaine   Patch 1 Patch Transdermal daily  pantoprazole    Tablet 40 milliGRAM(s) Oral before breakfast  saccharomyces boulardii 250 milliGRAM(s) Oral two times a day  sacubitril 49 mG/valsartan 51 mG 1 Tablet(s) Oral two times a day  vancomycin  IVPB 1000 milliGRAM(s) IV Intermittent every 48 hours    MEDICATIONS  (PRN):  dextrose 40% Gel 15 Gram(s) Oral once PRN Blood Glucose LESS THAN 70 milliGRAM(s)/deciliter  glucagon  Injectable 1 milliGRAM(s) IntraMuscular once PRN Glucose LESS THAN 70 milligrams/deciliter 58y Female,   Patient is a 58y old  Female who presents with a chief complaint of sent from pulmonology office (2019 14:08)      HOSPITALIZATION DAY: 2   [facility] 2GUL 2326          INTERVAL/OVERNIGHT EVENTS:    Patient examined at beside. No acute events over night.  As per patient, states felling  better compared to admission but not at her baseline, states the SOB persist .Patient is tolerating po diet w/o nausea/vomiting/diarrhea/abdominal pain. Voiding small quantity of urine as it is her baseline, last  BM  on yesterday. Patient is ambulating from bed to chair.   Patient denies chest pain, calf pain, abdominal pain, headaches, fever, chills, dysuria, hematuria, diarrhea, constipation,  palpitations.   Rest of ROS not contributory except for above.      CARDIAC MONITOR: no events.  Supplemental O2 Nasal cannula at 2L/min    I&O's Summary    10 Feb 2019 07:  -  2019 07:00  --------------------------------------------------------  IN: 300 mL / OUT: 0 mL / NET: 300 mL    2019 07:01  -  2019 16:16  --------------------------------------------------------  IN: 0 mL / OUT: 1500 mL / NET: -1500 mL        Daily     Daily Weight in k.1 (2019 08:07)    CAPILLARY BLOOD GLUCOSE      POCT Blood Glucose.: 88 mg/dL (2019 08:20)  POCT Blood Glucose.: 119 mg/dL (10 Feb 2019 22:01)  POCT Blood Glucose.: 119 mg/dL (10 Feb 2019 17:07)  POCT Blood Glucose.: 54 mg/dL (10 Feb 2019 16:22)  POCT Blood Glucose.: 55 mg/dL (10 Feb 2019 16:22)        PHYSICAL EXAM: Vital signs reviewed.  GENERAL: Not in acute distress, tolerating decubitus. Alert and oriented. Cooperative.   HEENT: moist mucous membranes. Clear conjuctiva.   NECK: Supple.  CARDIOVASCULAR: Normal S1 and S2, No murmur, RRR.  RESPIRATORY: Crackles predominantly on R low and middle lobes, and b/l lung bases.   GASTROINTESTINAL: Soft, nontender. Mildly distended.   EXTREMITIES: No cyanosis or edema. Distal pulses wnl. B/l pedal +1 edema.   SKIN: warm and dry .   NEURO: Alert/oriented x 3. No focal signs on exam.     LABS                          10.7   3.9   )-----------( 184      ( 2019 06:54 )             35.9         02-11    134<L>  |  92<L>  |  32.0<H>  ----------------------------<  80  4.9   |  27.0  |  5.20<H>    Ca    8.7      2019 06:53  Phos  4.8       Mg     2.3     11        Culture - Blood (collected 2019 13:04)  Source: .Blood  Preliminary Report (2019 15:00):    No growth at 48 hours    Culture - Blood (collected 2019 13:04)  Source: .Blood  Preliminary Report (2019 15:00):    No growth at 48 hours        IMAGING      DIET:    MEDICATIONS  (STANDING):  amLODIPine   Tablet 2.5 milliGRAM(s) Oral daily  aspirin enteric coated 81 milliGRAM(s) Oral daily  calcitriol   Capsule 0.25 MICROGram(s) Oral daily  carvedilol 12.5 milliGRAM(s) Oral every 12 hours  cefepime   IVPB 2000 milliGRAM(s) IV Intermittent daily  dextrose 5%. 1000 milliLiter(s) (50 mL/Hr) IV Continuous <Continuous>  dextrose 50% Injectable 12.5 Gram(s) IV Push once  dextrose 50% Injectable 25 Gram(s) IV Push once  dextrose 50% Injectable 25 Gram(s) IV Push once  ferrous    sulfate 325 milliGRAM(s) Oral daily  heparin  Injectable 5000 Unit(s) SubCutaneous every 8 hours  lidocaine   Patch 1 Patch Transdermal daily  pantoprazole    Tablet 40 milliGRAM(s) Oral before breakfast  saccharomyces boulardii 250 milliGRAM(s) Oral two times a day  sacubitril 49 mG/valsartan 51 mG 1 Tablet(s) Oral two times a day  vancomycin  IVPB 1000 milliGRAM(s) IV Intermittent every 48 hours    MEDICATIONS  (PRN):  dextrose 40% Gel 15 Gram(s) Oral once PRN Blood Glucose LESS THAN 70 milliGRAM(s)/deciliter  glucagon  Injectable 1 milliGRAM(s) IntraMuscular once PRN Glucose LESS THAN 70 milligrams/deciliter

## 2019-02-11 NOTE — PROGRESS NOTE ADULT - SUBJECTIVE AND OBJECTIVE BOX
[facility]  INFECTIOUS DISEASES AND INTERNAL MEDICINE at [place]  =======================================================  Jose Carlos Servin MD  Diplomates American Board of Internal Medicine and Infectious Diseases  =======================================================    MRN-[id number]  JAROCHO MORALES     Follow up: loculated pleural effusion    On O2 by NC, doing fine, NAD no respiratory distress.   Afebrile.     PAST MEDICAL & SURGICAL HISTORY:  Coronary artery disease, angina presence unspecified, unspecified vessel or lesion type, unspecified whether native or transplanted heart  Paroxysmal atrial fibrillation  Anemia due to chronic kidney disease, unspecified CKD stage  Chronic systolic congestive heart failure  Pleural effusion  Pericardial effusion  End stage renal disease on dialysis  HLD (hyperlipidemia)  HTN (hypertension)  DM (diabetes mellitus)  A-V fistula  Encounter for dialysis catheter care    Social Hx: no smoking or other toxic habits    FAMILY HISTORY:  Family history of kidney disease in brother (Sibling)    Allergies  No Known Allergies    Antibiotics:  cefepime   IVPB 2000 milliGRAM(s) IV Intermittent daily  vancomycin  IVPB 1000 milliGRAM(s) IV Intermittent every 48 hours     REVIEW OF SYSTEMS:  CONSTITUTIONAL:  No Fever or chills  HEENT:  No diplopia or blurred vision.  No sore throat or runny nose.  CARDIOVASCULAR:  No chest pain or SOB.  RESPIRATORY:  +Cough, +shortness of breath, PND or orthopnea.  GASTROINTESTINAL:  No nausea, vomiting or diarrhea.  GENITOURINARY:  No dysuria, frequency or urgency. No Blood in urine  MUSCULOSKELETAL:  no joint aches, no muscle pain  SKIN:  No change in skin, hair or nails.  NEUROLOGIC:  No paresthesias, fasciculations, seizures or weakness.  PSYCHIATRIC:  No disorder of thought or mood.  ENDOCRINE:  No heat or cold intolerance, polyuria or polydipsia.  HEMATOLOGICAL:  No easy bruising or bleeding.     Physical Exam:  Vital Signs Last 24 Hrs  T(C): 36.7 (11 Feb 2019 12:37), Max: 37.1 (10 Feb 2019 15:35)  T(F): 98.1 (11 Feb 2019 12:37), Max: 98.8 (11 Feb 2019 05:35)  HR: 78 (11 Feb 2019 11:40) (70 - 88)  BP: 135/75 (11 Feb 2019 12:37) (121/69 - 142/86)  RR: 18 (11 Feb 2019 12:37) (18 - 20)  SpO2: 96% (11 Feb 2019 12:37) (94% - 98%)  GEN: NAD on o2  HEENT: normocephalic and atraumatic. EOMI. PERRL.    NECK: Supple.  No lymphadenopathy   LUNGS: Decreased BS in R lung in general, R lung with rales all over and left lung only on base.   HEART: Regular rate and rhythm   ABDOMEN: Soft, nontender, and nondistended.  Positive bowel sounds.    : No CVA tenderness  EXTREMITIES: Without any cyanosis, clubbing, rash, lesions or edema.  NEUROLOGIC: grossly intact.  PSYCHIATRIC: Appropriate affect .  SKIN: No ulceration or induration present.    Labs:  02-11    134<L>  |  92<L>  |  32.0<H>  ----------------------------<  80  4.9   |  27.0  |  5.20<H>    Ca    8.7      11 Feb 2019 06:53  Phos  4.8     02-11  Mg     2.3     02-11    TPro  7.4  /  Alb  3.1<L>  /  TBili  0.5  /  DBili  x   /  AST  14  /  ALT  5   /  AlkPhos  140<H>  02-09                        10.7   3.9   )-----------( 184      ( 11 Feb 2019 06:54 )             35.9     LIVER FUNCTIONS - ( 09 Feb 2019 15:12 )  Alb: 3.1 g/dL / Pro: 7.4 g/dL / ALK PHOS: 140 U/L / ALT: 5 U/L / AST: 14 U/L / GGT: x           RECENT CULTURES:  02-09 @ 23:23      [facility]    All imaging and other data have been reviewed.    Impression: Chest CT 2/9  -Right perihilar consolidation and right interlobular septal thickening;   differential diagnosis includes malignancy and pneumonia.  -Small-moderate right pleural effusion is loculated, differential   diagnosis includes malignancy, parapneumonic effusion, or empyema.  -Severe global cardiomegaly.  -Small-moderate ascites, unclear etiology.

## 2019-02-11 NOTE — PROGRESS NOTE ADULT - ASSESSMENT
57 yo female with PMH of ESRD on HD M-W-F, DM2, PAF, NICM / chronic diastolic HF with recent hospitalization in [facility] with positive Influenza A complicated with right pleural effusion requiring thoracocentesis and discharged home on supplemental O2 3L/min, presenting to ED referred from Pulmonologist after finding of loculated pleural effusion on CT chest of unclear etiology, associated with dyspnea on exertion and peripheral edema, with moderate ascites.     Dyspnea on excerption likely 2/2 loculated pleural effusion with concomitant fluid overload noted peripheral edema/ ascites in the setting ESRD/ CHF   - Patient  remains afebrile w/o leucocytosis   - SOB is persist but improved compared to admission.  - c/w Vanco and cefepime   - c/w Florastor while on ABX  - F/u Sputum and BCx     - HIV negative   - F/u  quantiferon/ RF/ ESR   - c/w Supplemental O2 as needed  Pulmonology consult noted and appreciated and d/w Dr. Velez the plan of care   - CT Sx consult noted and appreciated. In conversation with CT surgery they recommended to contact IR for further management of the effusion.   - Nephro f/u noted and appreciated    Chronic diastolic HFPEF   - hypervolemia upon admission, improved after dialysis.    - Last TTE: 11/2018: EF 55-55%   - BNP: > 70,000  - c/w Strict I/o   Fluid restriction   - c/w entresto po bid   - c/w coreg po bid     ESRD on HD   - s/p HD: 02/11/2019  - Upon admission noted hyperkalemia reversed with with insulin/ dextrose and Kayexalate.  - Stable K+ at 4.9   - C/W HD on M / W / F schedule  - c/w calcitriol 0.25mg po qd   - Nephro f/u noted and appreciated     DM2  - Last HbA1C: 6.2 on 01/01/2019  - - fs were stable but post lung with hypoglycemia   - D/c HSS for now unclear etiology of low fs   - c/w hypoglycemia protocol     HTN  - bp stable   -c/w Coreg 12.5mg BID PO  - c/w Amlodipine 2.5 QD PO  - c/w Entresto 49/51 PO QD    Anemia in ESRD  - h/h stable  - c/w monitoring h/h     Prophylactic measure:  Heparin 5000 UI SC Q 8hrs  Florastor while on Abx  Omeprazol 40mg PO    Advanced Directive: Full code     Dispo: PT consulted. 59 yo female with hx of ESRD on HD M-W-F, DM2, PAF, NICM / chronic diastolic HF with recent hospitalization in [facility] with positive Influenza A complicated with right pleural effusion requiring thoracocentesis and discharged home on supplemental O2 3L/min, presenting to ED referred from Pulmonologist after finding of loculated R pleural effusion on CT chest of unclear etiology, associated with dyspnea on exertion and noted peripheral edema, with moderate ascites. Pt receiving HD to offload fluid. CT sx consulted and pt pending IR guided thoracocentesis for further evaluation and analysis of fluid. Pulm continues to follow the pt.     Dyspnea on excertion likely 2/2 loculated R pleural effusion with concomitant fluid overload noted peripheral edema/ ascites in the setting ESRD/ CHF   - Patient  remains afebrile w/o leucocytosis   - SOB is persist but improved compared to admission.  - elevated procalcitonin/ esr   -Negative RF/ HIV - F/u  quantiferon  - c/w Vanco and cefepime empirically   - c/w Florastor while on ABX  - F/u Sputum and BCx     - c/w Supplemental O2 as needed  - repeat cxr post HD ordered by Pulm, with minimal improvement   - c/w offloading excess fluid with HD, if ascites persists post HD will consider paracentesis but prior to this will need thoracocentesis for dx of loculated effusion etiology   - Pulmonology cf/u noted and appreciated    - CT Sx consult noted and appreciated. In conversation with CT surgery they recommended to contact IR for further management of the effusion.   - IR consulted, pt NPO at midnight for possible thoracocentesis in the am   - Nephro f/u noted and appreciated    Chronic diastolic HFPEF   - hypervolemia upon admission   - Last TTE: 11/2018: EF 55-55%   - BNP: > 70,000  - c/w Strict I/o   Fluid restriction   - c/w entresto po bid   - c/w coreg po bid   - c/w offloading fluid with HD    ESRD on HD   - s/p HD: 02/11/2019  - Upon admission noted hyperkalemia reversed with insulin/ dextrose and Kayexalate. Thereafter with HD   - Stable K+ at 4.9 this am   - will monitor electrolytes closely   - c/w offloading fluid with HD   - C/W HD on M / W / F schedule  - c/w calcitriol 0.25mg po qd   - Nephro f/u noted and appreciated     DM2  - Last HbA1C: 6.2 on 01/01/2019  - fs low normal  and had episode of hypoglycemia on 2/10/19   - Dno HSS coverage   - c/w hypoglycemia protocol     HTN  - bp stable   -c/w Coreg 12.5mg BID PO  - c/w Amlodipine 2.5 QD PO  - c/w Entresto 49/51 PO QD    Anemia in ESRD  - h/h stable  - c/w monitoring h/h     Prophylactic measure:  Holding AC in anticipation of IR guided intervention in the am   Florastor while on Abx  Omeprazol 40mg PO    Advanced Directive: Full code     Dispo: PT consulted and awaiting further recommendations.

## 2019-02-11 NOTE — PROGRESS NOTE ADULT - SUBJECTIVE AND OBJECTIVE BOX
PULMONARY PROGRESS NOTE      FRANNIE MORALESEARNEST-[id number]    Patient is a 58y old  Female who presents with a chief complaint of sent from pulmonology office (10 Feb 2019 15:36)      INTERVAL HPI/OVERNIGHT EVENTS:  Awake alert on HD  no pain or distress  no sputum  On cefep[georgie and vanco    MEDICATIONS  (STANDING):  amLODIPine   Tablet 2.5 milliGRAM(s) Oral daily  aspirin enteric coated 81 milliGRAM(s) Oral daily  calcitriol   Capsule 0.25 MICROGram(s) Oral daily  carvedilol 12.5 milliGRAM(s) Oral every 12 hours  cefepime   IVPB 2000 milliGRAM(s) IV Intermittent daily  dextrose 5%. 1000 milliLiter(s) (50 mL/Hr) IV Continuous <Continuous>  dextrose 50% Injectable 12.5 Gram(s) IV Push once  dextrose 50% Injectable 25 Gram(s) IV Push once  dextrose 50% Injectable 25 Gram(s) IV Push once  ferrous    sulfate 325 milliGRAM(s) Oral daily  heparin  Injectable 5000 Unit(s) SubCutaneous every 8 hours  lidocaine   Patch 1 Patch Transdermal daily  pantoprazole    Tablet 40 milliGRAM(s) Oral before breakfast  saccharomyces boulardii 250 milliGRAM(s) Oral two times a day  sacubitril 49 mG/valsartan 51 mG 1 Tablet(s) Oral two times a day  vancomycin  IVPB 1000 milliGRAM(s) IV Intermittent every 48 hours      MEDICATIONS  (PRN):  dextrose 40% Gel 15 Gram(s) Oral once PRN Blood Glucose LESS THAN 70 milliGRAM(s)/deciliter  glucagon  Injectable 1 milliGRAM(s) IntraMuscular once PRN Glucose LESS THAN 70 milligrams/deciliter      Allergies    No Known Allergies    Intolerances        PAST MEDICAL & SURGICAL HISTORY:  Coronary artery disease, angina presence unspecified, unspecified vessel or lesion type, unspecified whether native or transplanted heart  Paroxysmal atrial fibrillation  Anemia due to chronic kidney disease, unspecified CKD stage  Chronic systolic congestive heart failure  Pleural effusion  Pericardial effusion  End stage renal disease on dialysis  HLD (hyperlipidemia)  HTN (hypertension)  DM (diabetes mellitus)  A-V fistula  Encounter for dialysis catheter care      SOCIAL HISTORY  Smoking History:       REVIEW OF SYSTEMS:    CONSTITUTIONAL:  No distress    HEENT:  Eyes:  No diplopia or blurred vision. ENT:  No earache, sore throat or runny nose.    CARDIOVASCULAR:  No pressure, squeezing, tightness, heaviness or aching about the chest; no palpitations.    RESPIRATORY:  above    GASTROINTESTINAL:  No nausea, vomiting or diarrhea.    GENITOURINARY:  No dysuria, frequency or urgency.    NEUROLOGIC:  No paresthesias, fasciculations, seizures or weakness.    Extremities: No cyanosis, clubbing or edema    PSYCHIATRIC:  No disorder of thought or mood.    Vital Signs Last 24 Hrs  T(C): 36.9 (11 Feb 2019 08:07), Max: 37.1 (10 Feb 2019 15:35)  T(F): 98.5 (11 Feb 2019 08:07), Max: 98.8 (11 Feb 2019 05:35)  HR: 72 (11 Feb 2019 08:07) (70 - 88)  BP: 130/59 (11 Feb 2019 08:07) (121/69 - 142/86)  BP(mean): --  RR: 18 (11 Feb 2019 08:07) (18 - 20)  SpO2: 95% (11 Feb 2019 08:07) (94% - 98%)    PHYSICAL EXAMINATION:    GENERAL: The patient is awake and alert in no apparent distress.     HEENT: Head is normocephalic and atraumatic. Extraocular muscles are intact. Mucous membranes are moist.    NECK: Supple.    LUNGS: rt pig tail cath Clear to auscultation without wheezing, rales or rhonchi; respirations unlabored    HEART: Regular rate and rhythm without murmur.    ABDOMEN: Soft, nontender, and nondistended.      EXTREMITIES: Without any cyanosis, clubbing, rash, lesions or edema.    NEUROLOGIC: Grossly intact.    LABS:                        10.7   3.9   )-----------( 184      ( 11 Feb 2019 06:54 )             35.9     02-11    134<L>  |  92<L>  |  32.0<H>  ----------------------------<  80  4.9   |  27.0  |  5.20<H>    Ca    8.7      11 Feb 2019 06:53  Phos  4.8     02-11  Mg     2.3     02-11    TPro  7.4  /  Alb  3.1<L>  /  TBili  0.5  /  DBili  x   /  AST  14  /  ALT  5   /  AlkPhos  140<H>  02-09    PT/INR - ( 09 Feb 2019 13:03 )   PT: 13.7 sec;   INR: 1.19 ratio         PTT - ( 09 Feb 2019 13:03 )  PTT:31.3 sec            Serum Pro-Brain Natriuretic Peptide: >14660 pg/mL (02-09-19 @ 15:12)      Procalcitonin, Serum: 0.20 ng/mL (02-11-19 @ 06:53)      MICROBIOLOGY:    RADIOLOGY & ADDITIONAL STUDIES:]\  < from: CT Abdomen and Pelvis No Cont (02.09.19 @ 11:54) >   EXAM:  CT ABDOMEN AND PELVIS                         EXAM:  CT CHEST                          PROCEDURE DATE:  02/09/2019          INTERPRETATION:  Clinical Information: Dyspnea    Comparison: 12/31/2018    Procedure:  CT of the Chest, Abdomen andPelvis was performed without intravenous   contrast.   Intravenous contrast: None.  Oral contrast: None.  Sagittal and coronal reformats were performed.    Findings:    Chest:    Airways, pleura, lungs: Central airways patent. Trace left pleural   effusion. Small-moderate loculated right pleural effusion. And perihilar   consolidation of unclear etiology. Interlobular septal thickening on the   right of unclear etiology.  Vasculature: Unremarkable.  Mediastinum and vishal: Severe cardiomegaly. Pericardium and esophagus   unremarkable.  Chest wall and imaged neck: Unremarkable.  Neuromusculoskeletal: Unremarkable.    Abdomen/pelvis:     Hepatobiliary: The liver, gallbladder, and biliary tree are unremarkable  Spleen: Unremarkable  Pancreas: Unremarkable  Adrenal glands: Unremarkable  Urinary: The kidneys, ureters, and urinary bladder are unremarkable  Reproductive: The uterus and adnexa are unremarkable.  Gastrointestinal: Very limited evaluation of the bowel due to lack of   intravenous contrast, oral contrast, and presence of ascites which   obscures the fat/bowel interface. No bowel obstruction.  Peritoneum: Small-moderate ascites. No free air.  Vasculature: Unremarkable  Retroperitoneum: Unremarkable  Abdominal wall: Unremarkable  Neuromusculoskeletal: Unremarkable    Impression:   -Right perihilar consolidation and right interlobular septal thickening;   differential diagnosis includes malignancy and pneumonia.  -Small-moderate right pleural effusion is loculated, differential   diagnosis includes malignancy, parapneumonic effusion, or empyema.  -Severe global cardiomegaly.  -Small-moderate ascites, unclear etiology.                LUIS ARMANDO CARRERA M.D., ATTENDING RADIOLOGIST  This document has been electronically signed. Feb 9 2019  1:11PM    < end of copied text >  All films reviewed on PACS

## 2019-02-11 NOTE — PROGRESS NOTE ADULT - ASSESSMENT
59 y/o woman with PMH of ESRD on HD, DM2, Paroxysmal AFib, CHF with recent hospitalization in [facility] with + flu and right pleural effusion s/p thoracocentesis and sent home with supplemental O2 3L brought in from home, during her outpt follow up had a CXR that showed loculated effusion so Dr. Nolasco send her for admission and work up.   Chest CT in [facility] showed multiple loculations in right lung. After flu pneumonia and empyema could happen especially with staph.     Pneumonia  Loculated pleural effusion    - Blood cultures pending  - Sputum culture pending   - Procalcitonin 0.20  - Leukopenia in baseline   - Afebrile  - HIV and RVP negative   - IR or CT surgery for pleural fluid drainage, please send for culture and fluid analysis  - Continue cefepime 2gm daily   - Continue vancomycin 1gm post dialysis  - Trough predialysis between 15 and 20  - Will switch ABx regimen based on culture results.     Will follow.

## 2019-02-12 LAB
ALBUMIN SERPL ELPH-MCNC: 3 G/DL — LOW (ref 3.3–5.2)
ALP SERPL-CCNC: 112 U/L — SIGNIFICANT CHANGE UP (ref 40–120)
ALT FLD-CCNC: <5 U/L — SIGNIFICANT CHANGE UP
ANION GAP SERPL CALC-SCNC: 14 MMOL/L — SIGNIFICANT CHANGE UP (ref 5–17)
AST SERPL-CCNC: 12 U/L — SIGNIFICANT CHANGE UP
BASOPHILS # BLD AUTO: 0 K/UL — SIGNIFICANT CHANGE UP (ref 0–0.2)
BASOPHILS NFR BLD AUTO: 0.8 % — SIGNIFICANT CHANGE UP (ref 0–2)
BILIRUB SERPL-MCNC: 0.5 MG/DL — SIGNIFICANT CHANGE UP (ref 0.4–2)
BUN SERPL-MCNC: 23 MG/DL — HIGH (ref 8–20)
CALCIUM SERPL-MCNC: 8.4 MG/DL — LOW (ref 8.6–10.2)
CHLORIDE SERPL-SCNC: 95 MMOL/L — LOW (ref 98–107)
CO2 SERPL-SCNC: 24 MMOL/L — SIGNIFICANT CHANGE UP (ref 22–29)
CREAT SERPL-MCNC: 3.96 MG/DL — HIGH (ref 0.5–1.3)
EOSINOPHIL # BLD AUTO: 0.1 K/UL — SIGNIFICANT CHANGE UP (ref 0–0.5)
EOSINOPHIL NFR BLD AUTO: 3.4 % — SIGNIFICANT CHANGE UP (ref 0–6)
GAMMA INTERFERON BACKGROUND BLD IA-ACNC: 0.01 IU/ML — SIGNIFICANT CHANGE UP
GLUCOSE BLDC GLUCOMTR-MCNC: 148 MG/DL — HIGH (ref 70–99)
GLUCOSE BLDC GLUCOMTR-MCNC: 149 MG/DL — HIGH (ref 70–99)
GLUCOSE BLDC GLUCOMTR-MCNC: 237 MG/DL — HIGH (ref 70–99)
GLUCOSE SERPL-MCNC: 169 MG/DL — HIGH (ref 70–115)
HCT VFR BLD CALC: 34.6 % — LOW (ref 37–47)
HGB BLD-MCNC: 10.4 G/DL — LOW (ref 12–16)
LYMPHOCYTES # BLD AUTO: 0.5 K/UL — LOW (ref 1–4.8)
LYMPHOCYTES # BLD AUTO: 13.9 % — LOW (ref 20–55)
M TB IFN-G BLD-IMP: NEGATIVE — SIGNIFICANT CHANGE UP
M TB IFN-G CD4+ BCKGRND COR BLD-ACNC: 0 IU/ML — SIGNIFICANT CHANGE UP
M TB IFN-G CD4+CD8+ BCKGRND COR BLD-ACNC: 0 IU/ML — SIGNIFICANT CHANGE UP
MCHC RBC-ENTMCNC: 27.9 PG — SIGNIFICANT CHANGE UP (ref 27–31)
MCHC RBC-ENTMCNC: 30.1 G/DL — LOW (ref 32–36)
MCV RBC AUTO: 92.8 FL — SIGNIFICANT CHANGE UP (ref 81–99)
MONOCYTES # BLD AUTO: 0.5 K/UL — SIGNIFICANT CHANGE UP (ref 0–0.8)
MONOCYTES NFR BLD AUTO: 13.4 % — HIGH (ref 3–10)
NEUTROPHILS # BLD AUTO: 2.7 K/UL — SIGNIFICANT CHANGE UP (ref 1.8–8)
NEUTROPHILS NFR BLD AUTO: 68.5 % — SIGNIFICANT CHANGE UP (ref 37–73)
PLATELET # BLD AUTO: 172 K/UL — SIGNIFICANT CHANGE UP (ref 150–400)
POTASSIUM SERPL-MCNC: 4.7 MMOL/L — SIGNIFICANT CHANGE UP (ref 3.5–5.3)
POTASSIUM SERPL-SCNC: 4.7 MMOL/L — SIGNIFICANT CHANGE UP (ref 3.5–5.3)
PROCALCITONIN SERPL-MCNC: 0.16 NG/ML — HIGH (ref 0–0.04)
PROT SERPL-MCNC: 6.7 G/DL — SIGNIFICANT CHANGE UP (ref 6.6–8.7)
QUANT TB PLUS MITOGEN MINUS NIL: 3.31 IU/ML — SIGNIFICANT CHANGE UP
RBC # BLD: 3.73 M/UL — LOW (ref 4.4–5.2)
RBC # FLD: 16.4 % — HIGH (ref 11–15.6)
SODIUM SERPL-SCNC: 133 MMOL/L — LOW (ref 135–145)
VANCOMYCIN TROUGH SERPL-MCNC: 10.2 UG/ML — SIGNIFICANT CHANGE UP (ref 10–20)
WBC # BLD: 3.9 K/UL — LOW (ref 4.8–10.8)
WBC # FLD AUTO: 3.9 K/UL — LOW (ref 4.8–10.8)

## 2019-02-12 PROCEDURE — 99232 SBSQ HOSP IP/OBS MODERATE 35: CPT

## 2019-02-12 PROCEDURE — 99223 1ST HOSP IP/OBS HIGH 75: CPT

## 2019-02-12 PROCEDURE — 99233 SBSQ HOSP IP/OBS HIGH 50: CPT

## 2019-02-12 PROCEDURE — 99232 SBSQ HOSP IP/OBS MODERATE 35: CPT | Mod: GC

## 2019-02-12 RX ORDER — VANCOMYCIN HCL 1 G
1000 VIAL (EA) INTRAVENOUS ONCE
Qty: 0 | Refills: 0 | Status: COMPLETED | OUTPATIENT
Start: 2019-02-12 | End: 2019-02-12

## 2019-02-12 RX ORDER — VANCOMYCIN HCL 1 G
1000 VIAL (EA) INTRAVENOUS
Qty: 0 | Refills: 0 | Status: DISCONTINUED | OUTPATIENT
Start: 2019-02-12 | End: 2019-02-15

## 2019-02-12 RX ADMIN — LIDOCAINE 1 PATCH: 4 CREAM TOPICAL at 01:07

## 2019-02-12 RX ADMIN — PANTOPRAZOLE SODIUM 40 MILLIGRAM(S): 20 TABLET, DELAYED RELEASE ORAL at 05:01

## 2019-02-12 RX ADMIN — CALCITRIOL 0.25 MICROGRAM(S): 0.5 CAPSULE ORAL at 14:55

## 2019-02-12 RX ADMIN — Medication 250 MILLIGRAM(S): at 05:00

## 2019-02-12 RX ADMIN — Medication 325 MILLIGRAM(S): at 14:55

## 2019-02-12 RX ADMIN — SACUBITRIL AND VALSARTAN 1 TABLET(S): 24; 26 TABLET, FILM COATED ORAL at 20:47

## 2019-02-12 RX ADMIN — CARVEDILOL PHOSPHATE 12.5 MILLIGRAM(S): 80 CAPSULE, EXTENDED RELEASE ORAL at 20:47

## 2019-02-12 RX ADMIN — CEFEPIME 100 MILLIGRAM(S): 1 INJECTION, POWDER, FOR SOLUTION INTRAMUSCULAR; INTRAVENOUS at 11:25

## 2019-02-12 RX ADMIN — CARVEDILOL PHOSPHATE 12.5 MILLIGRAM(S): 80 CAPSULE, EXTENDED RELEASE ORAL at 05:00

## 2019-02-12 RX ADMIN — AMLODIPINE BESYLATE 2.5 MILLIGRAM(S): 2.5 TABLET ORAL at 05:00

## 2019-02-12 RX ADMIN — LIDOCAINE 1 PATCH: 4 CREAM TOPICAL at 19:20

## 2019-02-12 RX ADMIN — Medication 81 MILLIGRAM(S): at 14:55

## 2019-02-12 RX ADMIN — LIDOCAINE 1 PATCH: 4 CREAM TOPICAL at 11:25

## 2019-02-12 RX ADMIN — SACUBITRIL AND VALSARTAN 1 TABLET(S): 24; 26 TABLET, FILM COATED ORAL at 05:00

## 2019-02-12 RX ADMIN — Medication 250 MILLIGRAM(S): at 20:47

## 2019-02-12 RX ADMIN — Medication 250 MILLIGRAM(S): at 12:18

## 2019-02-12 NOTE — CONSULT NOTE ADULT - REASON FOR ADMISSION
sent from pulmonology office

## 2019-02-12 NOTE — CONSULT NOTE ADULT - ASSESSMENT
A/P: 57 y/o F PMHx of ESRD on HD MWF, HFrEF (EF 35-40% TTE 02/11/19), NICM (cardiac cath 03/18 non-obstructive CAD), paroxysmal Afib (no AC due to GI bleed), uremic pericarditis s/p pericardiocentesis, right sided pleural effusion s/p pigtail catheter, IDDm, and HTN who was sent from her Pulmonologist office due to worsening right effusion vs. infiltrates. Patient undergoing HD, tentative plan for IR paracentesis vs. thoracentesis. TTE showed EF down from 50-55% to 35-40% and mobile echodensity on AV.     1. Mobile Echodensity on Aortic Valve  - Present on previous TTE 11/22/18  - First blood Cx negative  - Repeat blood Cx  - No evidence of endocarditis at this time  - Continue to monitor    2. HFrEF  - Echo images reviewed by Dr. Mayo, TTE in 11/2018 with likely overestimated ejection fraction  - No acute change  - NICM (cath with non-obstructive CAD 03/18)  - Continue HD for fluid removal  - Cont Coreg and entresto  - Strict Is and Os  - Daily weights    3. Paroxysmal Afib  - Hx of paroxysmal Afib  - Not a candidate for AC due to GI bleed  - Continue Coreg

## 2019-02-12 NOTE — PROGRESS NOTE ADULT - ASSESSMENT
59 yo woman with PMH of ESRD on HD, DM and right loculated effusions  On Cefipime and vanco and nontoxic    Recommend that paracentesis is done but I strongly believe that repeat chest tap of loculated effusions will be required.  Spoke with Dr. Agee and Dr. Null.

## 2019-02-12 NOTE — PROGRESS NOTE ADULT - ASSESSMENT
Patient stable, has loculated Pl eff - ? can't be drained  will like more intensive HD - arranged again today  on Abx for ? PNA  Htn   DM  Continue

## 2019-02-12 NOTE — PROGRESS NOTE ADULT - SUBJECTIVE AND OBJECTIVE BOX
PULMONARY PROGRESS NOTE      FRANNIE MORALESEARNEST-[id number]    Patient is a 58y old  Female who presents with a chief complaint of sent from pulmonology office (12 Feb 2019 11:10)      INTERVAL HPI/OVERNIGHT EVENTS:    MEDICATIONS  (STANDING):  amLODIPine   Tablet 2.5 milliGRAM(s) Oral daily  aspirin enteric coated 81 milliGRAM(s) Oral daily  calcitriol   Capsule 0.25 MICROGram(s) Oral daily  carvedilol 12.5 milliGRAM(s) Oral every 12 hours  cefepime   IVPB 2000 milliGRAM(s) IV Intermittent daily  dextrose 5%. 1000 milliLiter(s) (50 mL/Hr) IV Continuous <Continuous>  dextrose 50% Injectable 12.5 Gram(s) IV Push once  dextrose 50% Injectable 25 Gram(s) IV Push once  dextrose 50% Injectable 25 Gram(s) IV Push once  ferrous    sulfate 325 milliGRAM(s) Oral daily  lidocaine   Patch 1 Patch Transdermal daily  pantoprazole    Tablet 40 milliGRAM(s) Oral before breakfast  saccharomyces boulardii 250 milliGRAM(s) Oral two times a day  sacubitril 49 mG/valsartan 51 mG 1 Tablet(s) Oral two times a day  vancomycin  IVPB 1000 milliGRAM(s) IV Intermittent <User Schedule>      MEDICATIONS  (PRN):  dextrose 40% Gel 15 Gram(s) Oral once PRN Blood Glucose LESS THAN 70 milliGRAM(s)/deciliter  glucagon  Injectable 1 milliGRAM(s) IntraMuscular once PRN Glucose LESS THAN 70 milligrams/deciliter      Allergies    No Known Allergies    Intolerances        PAST MEDICAL & SURGICAL HISTORY:  Coronary artery disease, angina presence unspecified, unspecified vessel or lesion type, unspecified whether native or transplanted heart  Paroxysmal atrial fibrillation  Anemia due to chronic kidney disease, unspecified CKD stage  Chronic systolic congestive heart failure  Pleural effusion  Pericardial effusion  End stage renal disease on dialysis  HLD (hyperlipidemia)  HTN (hypertension)  DM (diabetes mellitus)  A-V fistula  Encounter for dialysis catheter care      SOCIAL HISTORY  Smoking History:       REVIEW OF SYSTEMS:    CONSTITUTIONAL:  No distress    HEENT:  Eyes:  No diplopia or blurred vision. ENT:  No earache, sore throat or runny nose.    CARDIOVASCULAR:  No pressure, squeezing, tightness, heaviness or aching about the chest; no palpitations.    RESPIRATORY:  No cough, shortness of breath, PND or orthopnea. Mild SOBOE    GASTROINTESTINAL:  No nausea, vomiting or diarrhea.    GENITOURINARY:  No dysuria, frequency or urgency.    MUSCULOSKELETAL:  No joint pain    SKIN:  No new lesions.    NEUROLOGIC:  No paresthesias, fasciculations, seizures or weakness.    PSYCHIATRIC:  No disorder of thought or mood.    ENDOCRINE:  No heat or cold intolerance, polyuria or polydipsia.    HEMATOLOGICAL:  No easy bruising or bleeding.     Vital Signs Last 24 Hrs  T(C): 37.1 (12 Feb 2019 07:12), Max: 37.1 (12 Feb 2019 07:12)  T(F): 98.7 (12 Feb 2019 07:12), Max: 98.7 (12 Feb 2019 07:12)  HR: 73 (12 Feb 2019 07:12) (73 - 80)  BP: 148/81 (12 Feb 2019 07:12) (147/86 - 156/90)  BP(mean): --  RR: 18 (12 Feb 2019 07:12) (18 - 18)  SpO2: 91% (12 Feb 2019 07:12) (91% - 94%)    PHYSICAL EXAMINATION:    GENERAL: The patient is awake and alert in no apparent distress.     HEENT: Head is normocephalic and atraumatic. Extraocular muscles are intact. Mucous membranes are moist.    NECK: Supple.    LUNGS: Clear to auscultation without wheezing, rales or rhonchi; respirations unlabored    HEART: Regular rate and rhythm without murmur.    ABDOMEN: Soft, nontender, and nondistended.      EXTREMITIES: Without any cyanosis, clubbing, rash, lesions or edema.    NEUROLOGIC: Grossly intact.    SKIN: No ulceration or induration present.      LABS:                        10.4   3.9   )-----------( 172      ( 12 Feb 2019 05:49 )             34.6     02-12    133<L>  |  95<L>  |  23.0<H>  ----------------------------<  169<H>  4.7   |  24.0  |  3.96<H>    Ca    8.4<L>      12 Feb 2019 05:49  Phos  4.8     02-11  Mg     2.3     02-11    TPro  6.7  /  Alb  3.0<L>  /  TBili  0.5  /  DBili  x   /  AST  12  /  ALT  <5  /  AlkPhos  112  02-12                Serum Pro-Brain Natriuretic Peptide: >62320 pg/mL (02-09-19 @ 15:12)      Procalcitonin, Serum: 0.16 ng/mL (02-12-19 @ 06:02)  Procalcitonin, Serum: 0.20 ng/mL (02-11-19 @ 06:53)      MICROBIOLOGY:    RADIOLOGY & ADDITIONAL STUDIES:< from: Xray Chest 2 Views PA/Lat (02.11.19 @ 14:48) >  Persistent perihilar and RIGHT lung base interstitial opacities noted.  Multifocal airspace consolidation in the RIGHT lung base with a RIGHT   pleural effusion layering to the apex of RIGHT lung.  I there is LEFT lower lobe airspace consolidation with air bronchograms   and a probable pleural effusion.    LEFT apical lung clear.    There is gross cardiomegaly unchanged from prior examination.  The visualized osseus structures are intact.     IMPRESSION:    Gross cardiomegaly. Perihilar and interstitial and bibasilar LEFT greater   than RIGHT airspace consolidations and effusions as described.      < end of copied text >

## 2019-02-12 NOTE — PROGRESS NOTE ADULT - SUBJECTIVE AND OBJECTIVE BOX
[facility]  INFECTIOUS DISEASES AND INTERNAL MEDICINE at [place]  =======================================================  Jose Carlos Servin MD  Diplomates American Board of Internal Medicine and Infectious Diseases  =======================================================    MRN-[id number]  JAROCHO MORALES     Follow up: loculated pleural effusion    NAD no respiratory distress.   Afebrile.     PAST MEDICAL & SURGICAL HISTORY:  Coronary artery disease, angina presence unspecified, unspecified vessel or lesion type, unspecified whether native or transplanted heart  Paroxysmal atrial fibrillation  Anemia due to chronic kidney disease, unspecified CKD stage  Chronic systolic congestive heart failure  Pleural effusion  Pericardial effusion  End stage renal disease on dialysis  HLD (hyperlipidemia)  HTN (hypertension)  DM (diabetes mellitus)  A-V fistula  Encounter for dialysis catheter care    Social Hx: no smoking or other toxic habits    FAMILY HISTORY:  Family history of kidney disease in brother (Sibling)    Allergies  No Known Allergies    Antibiotics:  cefepime   IVPB 2000 milliGRAM(s) IV Intermittent daily  vancomycin  IVPB 1000 milliGRAM(s) IV Intermittent every 48 hours     REVIEW OF SYSTEMS:  CONSTITUTIONAL:  No Fever or chills  HEENT:  No diplopia or blurred vision.  No sore throat or runny nose.  CARDIOVASCULAR:  No chest pain or SOB.  RESPIRATORY:  +Cough, +shortness of breath, PND or orthopnea.  GASTROINTESTINAL:  No nausea, vomiting or diarrhea.  GENITOURINARY:  No dysuria, frequency or urgency. No Blood in urine  MUSCULOSKELETAL:  no joint aches, no muscle pain  SKIN:  No change in skin, hair or nails.  NEUROLOGIC:  No paresthesias, fasciculations, seizures or weakness.  PSYCHIATRIC:  No disorder of thought or mood.  ENDOCRINE:  No heat or cold intolerance, polyuria or polydipsia.  HEMATOLOGICAL:  No easy bruising or bleeding.     Physical Exam:  Vital Signs Last 24 Hrs  T(C): 37.1 (12 Feb 2019 07:12), Max: 37.1 (12 Feb 2019 07:12)  T(F): 98.7 (12 Feb 2019 07:12), Max: 98.7 (12 Feb 2019 07:12)  HR: 73 (12 Feb 2019 07:12) (73 - 80)  BP: 148/81 (12 Feb 2019 07:12) (135/75 - 156/90)  BP(mean): --  RR: 18 (12 Feb 2019 07:12) (18 - 18)  SpO2: 91% (12 Feb 2019 07:12) (91% - 96%)  GEN: NAD on o2  HEENT: normocephalic and atraumatic. EOMI. PERRL.    NECK: Supple.  No lymphadenopathy   LUNGS: Decreased BS in R lung in general, R lung with rales all over and left lung only on base.   HEART: Regular rate and rhythm   ABDOMEN: Soft, nontender, and nondistended.  Positive bowel sounds.    : No CVA tenderness  EXTREMITIES: Without any cyanosis, clubbing, rash, lesions or edema.  NEUROLOGIC: grossly intact.  PSYCHIATRIC: Appropriate affect .  SKIN: No ulceration or induration present.    Labs:  02-12    133<L>  |  95<L>  |  23.0<H>  ----------------------------<  169<H>  4.7   |  24.0  |  3.96<H>    Ca    8.4<L>      12 Feb 2019 05:49  Phos  4.8     02-11  Mg     2.3     02-11    TPro  6.7  /  Alb  3.0<L>  /  TBili  0.5  /  DBili  x   /  AST  12  /  ALT  <5  /  AlkPhos  112  02-12                        10.4   3.9   )-----------( 172      ( 12 Feb 2019 05:49 )             34.6     LIVER FUNCTIONS - ( 12 Feb 2019 05:49 )  Alb: 3.0 g/dL / Pro: 6.7 g/dL / ALK PHOS: 112 U/L / ALT: <5 U/L / AST: 12 U/L / GGT: x           RECENT CULTURES:  02-09 @ 23:23        NotDete  02-09 @ 13:04 .Blood     No growth at 48 hours    All imaging and other data have been reviewed.    Impression: Chest CT 2/9  -Right perihilar consolidation and right interlobular septal thickening;   differential diagnosis includes malignancy and pneumonia.  -Small-moderate right pleural effusion is loculated, differential   diagnosis includes malignancy, parapneumonic effusion, or empyema.  -Severe global cardiomegaly.  -Small-moderate ascites, unclear etiology.

## 2019-02-12 NOTE — CONSULT NOTE ADULT - ATTENDING COMMENTS
I reviewed the above and d/w Dr. Agee and PA in length.  We reviewed the TTE images from November as well as from this admission. There are mobile echo densities on the aortic valve which are unchanged. LVEF in my opinion was overestimated on prior echocardiogram.   Pt has NICM. Continue with HF meds. She may benefit from thoracentesis and paracentesis if HD does not improve her symptoms.   Please repeat blood cultures. There is not enough major or minor criteria at this time to indicate endocarditis.   Please call if needed.   I will sign off now.

## 2019-02-12 NOTE — PROGRESS NOTE ADULT - ASSESSMENT
59 y/o woman with PMH of ESRD on HD, DM2, Paroxysmal AFib, CHF with recent hospitalization in [facility] with + flu and right pleural effusion s/p thoracocentesis and sent home with supplemental O2 3L brought in from home, during her outpt follow up had a CXR that showed loculated effusion so Dr. Nolasco send her for admission and work up.   Chest CT in [facility] showed multiple loculations in right lung. After flu pneumonia and empyema could happen especially with staph.     Pneumonia  Loculated pleural effusion    - Blood culture negative   - Sputum culture not done   - Procalcitonin 0.20  - Leukopenia in baseline   - Afebrile  - HIV and RVP negative   - Seen by CT surgery and recommended IR paracentesis and thoracentesis.   - Please send the fluid for analysis and culture   - Continue cefepime 2gm daily   - Continue vancomycin 1gm post dialysis  - Trough predialysis between 15 and 20  - Will switch ABx regimen based on culture results.     Will follow.

## 2019-02-12 NOTE — CONSULT NOTE ADULT - SUBJECTIVE AND OBJECTIVE BOX
Patient is a 58y old  Female who presents with a chief complaint of sent from pulmonology office (12 Feb 2019 13:06)      HPI: 57 y/o F PMHx of ESRD on HD MWF, HFrEF (EF 35-40% TTE 02/11/19), NICM (cardiac cath 03/18 non-obstructive CAD), paroxysmal Afib (no AC due to GI bleed), uremic pericarditis s/p pericardiocentesis, right sided pleural effusion s/p pigtail catheter, IDDm, and HTN who was sent from her Pulmonologist office due to worsening right effusion vs. infiltrates. Patient has been on home O2 3L and has been having some baseline SOB, HAYES, and orthopnea.         57 y/o female with PMH of ESRD on HD M-W-F, DM, PAF, NICM / CHF with recent hospitalization in [facility] with + flu and right pleural effusion s/p thoracocentesis and sent home with supplemental O2 3L brought in from home by her son because she was ordered a CXR by her pulmonologist, Dr Lemus and then she was told to come to the ED. Patient has a baseline cough for 2 months, with whitish sputum, unchanged recently. She mentions baseline SOB and HAYES but not recently increased. She sleeps with 2-3 pillows. She mentions subjective fevers, weakness. She c/o constipation, generalized pain and leg swelling which is decreasing. Denies hemoptysis, mentions nausea and vomit only after she takes some of her pills but she is otherwise tolerating PO and with good apatite. Denies sick contacts, recent travels, dysuria, she still makes small amounts of urine.     In the ED chest and abdomen CT reported small pleural effusions, and right lower consolidations  ascites , EKG reported no ST or T-wave acute abnormalities. (09 Feb 2019 20:23)      PAST MEDICAL & SURGICAL HISTORY:  Coronary artery disease, angina presence unspecified, unspecified vessel or lesion type, unspecified whether native or transplanted heart  Paroxysmal atrial fibrillation  Anemia due to chronic kidney disease, unspecified CKD stage  Chronic systolic congestive heart failure  Pleural effusion  Pericardial effusion  End stage renal disease on dialysis  HLD (hyperlipidemia)  HTN (hypertension)  DM (diabetes mellitus)  A-V fistula  Encounter for dialysis catheter care      PREVIOUS DIAGNOSTIC TESTING:      ECHO  FINDINGS:  < from: TTE Echo Complete w/Doppler (02.11.19 @ 15:11) >     PHYSICIAN INTERPRETATION:  Left Ventricle: The left ventricular internal cavity size is normal.  Global LV systolic function was moderately decreased. Left ventricular   ejection fraction, by visualestimation, is 35 to 40%. Spectral Doppler   shows pseudonormal pattern of left ventricular myocardial filling (Grade   II diastolic dysfunction).  Right Ventricle: The right ventricular size is moderately enlarged. RV   systolic function is moderately reduced. TV S' 0.1 m/s.  Left Atrium: Severely enlarged left atrium.  Right Atrium: Moderately enlarged right atrium.  Pericardium: Trivial pericardial effusion is present.  Mitral Valve: Thickening of the anterior and posterior mitral valve   leaflets. There is mild mitral annular calcification. Mild mitral valve   regurgitation is seen.  Tricuspid Valve: The tricuspid valve is normal. Mild tricuspid   regurgitation is visualized.  Aortic Valve: The aortic valve is trileaflet. Sclerotic aorticvalve with   normal opening. Trivial aortic valve regurgitation is seen. Mobile echo   density is visualized, can not r/o vegetation (Lambl's excrescences).   Recommend GERALD if clinically correlated.  Pulmonic Valve: The pulmonic valve is normal. Mildpulmonic valve   regurgitation.  Aorta: The aortic root is normal in size and structure.  Pulmonary Artery: The pulmonary artery is of normal size and origin.  Venous: The pulmonary veins appear normal. The inferior vena cava was   normal sized, withrespiratory size variation less than 50%.       Summary:   1. Moderately decreased global left ventricular systolic function.   2. Left ventricular ejection fraction, by visual estimation, is 35 to   40%.   3. Severely enlarged left atrium.   4. Spectral Doppler shows pseudonormal pattern of left ventricular   myocardial filling (Grade II diastolic dysfunction).   5. Moderately enlarged right ventricle Moderately reduced RV systolic   function.   6. Mild mitral valve regurgitation.   7. Mild tricuspid regurgitation.   8. Mobile echo density is visualized on the aortic valve leaflets most   consistent with Lambl's excrescences, however cannot entirely exclude   vegetation. Recommend GERALD if clinically correlated.    [id number] Elian Richards Jr., MD, Electronically signed on 2/11/2019 at     < end of copied text >    CATHETERIZATION  FINDINGS:< from: Cardiac Cath Lab - Adult (03.23.18 @ 13:53) >  VENTRICLES: There were no left ventricular global or regional wall motion  abnormalities. Global left ventricular function was normal. EF calculated  by contrast ventriculography was 55 %.  VALVES: AORTIC VALVE: No significant aortic valve gradient. MITRAL VALVE:  The mitral valve exhibited trivial regurgitation (less than 1+).  CORONARY VESSELS: The coronary circulation is right dominant.  LM:   --  LM: Normal. The vessel was normal sized, not calcified, and not  tortuous. Angiography showed no evidence of disease.  LAD:   --  LAD: Normal. The vessel was normal sized, not calcified, and not  tortuous. Angiography showed minor luminal irregularities with no flow  limiting lesions.  CX:   --  Circumflex: Normal. The vessel was normal sized, not calcified,  and excessively tortuous.Angiography showed no evidence of disease.  RCA:   --  RCA: Normal. The vessel was normal sized, not calcified, and  moderately tortuous. Angiography showed no evidence of disease.  COMPLICATIONS: There were no complications. No complications occurred  during the cath lab visit.  DIAGNOSTIC IMPRESSIONS: There is mild irregularity of the coronary anatomy.  Left ventricular function is normal (LVEF=55%).  DIAGNOSTIC RECOMMENDATIONS: The patient should continue with the present  medications. Patientmanagement should include aggressive medical therapy  and resumption of all previous activities in 2 days.  Prepared and signed by  Jesús Godinez MD  Signed 03/23/2018 14:30:17    < end of copied text >        Allergies    No Known Allergies    Intolerances        MEDICATIONS  (STANDING):  amLODIPine   Tablet 2.5 milliGRAM(s) Oral daily  aspirin enteric coated 81 milliGRAM(s) Oral daily  calcitriol   Capsule 0.25 MICROGram(s) Oral daily  carvedilol 12.5 milliGRAM(s) Oral every 12 hours  cefepime   IVPB 2000 milliGRAM(s) IV Intermittent daily  dextrose 5%. 1000 milliLiter(s) (50 mL/Hr) IV Continuous <Continuous>  dextrose 50% Injectable 12.5 Gram(s) IV Push once  dextrose 50% Injectable 25 Gram(s) IV Push once  dextrose 50% Injectable 25 Gram(s) IV Push once  ferrous    sulfate 325 milliGRAM(s) Oral daily  lidocaine   Patch 1 Patch Transdermal daily  pantoprazole    Tablet 40 milliGRAM(s) Oral before breakfast  saccharomyces boulardii 250 milliGRAM(s) Oral two times a day  sacubitril 49 mG/valsartan 51 mG 1 Tablet(s) Oral two times a day  vancomycin  IVPB 1000 milliGRAM(s) IV Intermittent <User Schedule>    MEDICATIONS  (PRN):  dextrose 40% Gel 15 Gram(s) Oral once PRN Blood Glucose LESS THAN 70 milliGRAM(s)/deciliter  glucagon  Injectable 1 milliGRAM(s) IntraMuscular once PRN Glucose LESS THAN 70 milligrams/deciliter      FAMILY HISTORY:  Family history of kidney disease in brother (Sibling)      SOCIAL HISTORY:    CIGARETTES: Denies    ALCOHOL: Denies    REVIEW OF SYSTEMS:  CONSTITUTIONAL: No fever, weight loss, or fatigue  EYES: No eye pain, visual disturbances, or discharge  ENMT:  No difficulty hearing, tinnitus, vertigo; No sinus or throat pain  NECK: No pain or stiffness  RESPIRATORY: No cough, wheezing, chills or hemoptysis; No Shortness of Breath  CARDIOVASCULAR: No chest pain, palpitations, passing out, dizziness, or leg swelling  GASTROINTESTINAL: No abdominal or epigastric pain. No nausea, vomiting, or hematemesis; No diarrhea or constipation. No melena or hematochezia.  GENITOURINARY: No dysuria, frequency, hematuria, or incontinence  NEUROLOGICAL: No headaches, memory loss, loss of strength, numbness, or tremors  SKIN: No itching, burning, rashes, or lesions   LYMPH Nodes: No enlarged glands  ENDOCRINE: No heat or cold intolerance; No hair loss  MUSCULOSKELETAL: No joint pain or swelling; No muscle, back, or extremity pain  PSYCHIATRIC: No depression, anxiety, mood swings, or difficulty sleeping  HEME/LYMPH: No easy bruising, or bleeding gums  ALLERY AND IMMUNOLOGIC: No hives or eczema	      REVIEW OF SYMPTOMS: Cardiovascular:     chest pain,  dyspnea,  syncope,    palpitaitons,      dizziness,    Orthopnea,      Paroxsymal nocturnal dyspnea;  Respiratory:    Dyspnea,   cough,   ;   Genitourinary:  No dysuria, no hematuria; Gastrointestinal:   No dark color stool, no melena, no diarrhea, no constipation, no abdominal pain; Neurological: No headache, no dizziness, no slurred speech;  Psychiatric: No agitation, no anxiety.  ALL OTHER REVIEW OF SYSTEMS ARE NEGATIVE.    Vital Signs Last 24 Hrs  T(C): 37.1 (12 Feb 2019 07:12), Max: 37.1 (12 Feb 2019 07:12)  T(F): 98.7 (12 Feb 2019 07:12), Max: 98.7 (12 Feb 2019 07:12)  HR: 73 (12 Feb 2019 07:12) (73 - 80)  BP: 148/81 (12 Feb 2019 07:12) (147/86 - 156/90)  BP(mean): --  RR: 18 (12 Feb 2019 07:12) (18 - 18)  SpO2: 91% (12 Feb 2019 07:12) (91% - 94%)    Daily     Daily     I&O's Detail    11 Feb 2019 07:01  -  12 Feb 2019 07:00  --------------------------------------------------------  IN:    Oral Fluid: 500 mL  Total IN: 500 mL    OUT:    Other: 1500 mL  Total OUT: 1500 mL    Total NET: -1000 mL          PHYSICAL EXAM:  Appearance: Normal, well nourished	  HEENT:   Normal oral mucosa, PERRL, EOMI, sclera non-icteric	  Lymphatic: No cervical lymphadenopathy  Cardiovascular: Normal S1 S2, No JVD, No cardiac murmurs, No carotid bruits, No peripheral edema  Respiratory: Lungs clear to auscultation	  Psychiatry: A & O x 3, Mood & affect appropriate  Gastrointestinal:  Soft, Non-tender, + BS, no bruits	  Skin: No rashes, No ecchymoses, No cyanosis  Neurologic: Grossly non-focal with full strength in all four extremities  Extremities: Normal range of motion, No clubbing, cyanosis or edema  Vascular: Peripheral pulses palpable 2+ bilaterally      INTERPRETATION OF TELEMETRY: NSR, PVCs    ECG:    LABS:                        10.4   3.9   )-----------( 172      ( 12 Feb 2019 05:49 )             34.6     02-12    133<L>  |  95<L>  |  23.0<H>  ----------------------------<  169<H>  4.7   |  24.0  |  3.96<H>    Ca    8.4<L>      12 Feb 2019 05:49  Phos  4.8     02-11  Mg     2.3     02-11    TPro  6.7  /  Alb  3.0<L>  /  TBili  0.5  /  DBili  x   /  AST  12  /  ALT  <5  /  AlkPhos  112  02-12            I&O's Summary    11 Feb 2019 07:01  -  12 Feb 2019 07:00  --------------------------------------------------------  IN: 500 mL / OUT: 1500 mL / NET: -1000 mL      BNP    RADIOLOGY & ADDITIONAL STUDIES: Patient is a 58y old  Female who presents with a chief complaint of sent from pulmonology office (12 Feb 2019 13:06)      HPI: 59 y/o F PMHx of ESRD on HD MWF, HFrEF (EF 35-40% TTE 02/11/19), NICM (cardiac cath 03/18 non-obstructive CAD), paroxysmal Afib (no AC due to GI bleed), uremic pericarditis s/p pericardiocentesis, right sided pleural effusion s/p pigtail catheter, IDDm, and HTN who was sent from her Pulmonologist office due to worsening right effusion vs. infiltrates. Patient has been on home O2 3L and has been having some baseline SOB, HAYES, and orthopnea. Patient states that she was having some chills, and some occasional chest pain while coughing. Patient was found upon admission to having a R loculated effusion and fluid overload being treated with HD at this time, being evaluated by IR for paracentesis and thoracentesis. Patient had TTE today that showed a mobile echodensity with on aortic valve leaflets most consistent with lambl's excrescences. Patient states she is currently resting comfortably on oxygen, states she is feeling better today.     PAST MEDICAL & SURGICAL HISTORY:  Coronary artery disease, angina presence unspecified, unspecified vessel or lesion type, unspecified whether native or transplanted heart  Paroxysmal atrial fibrillation  Anemia due to chronic kidney disease, unspecified CKD stage  Chronic systolic congestive heart failure  Pleural effusion  Pericardial effusion  End stage renal disease on dialysis  HLD (hyperlipidemia)  HTN (hypertension)  DM (diabetes mellitus)  A-V fistula  Encounter for dialysis catheter care      PREVIOUS DIAGNOSTIC TESTING:      ECHO  FINDINGS:  < from: TTE Echo Complete w/Doppler (02.11.19 @ 15:11) >     PHYSICIAN INTERPRETATION:  Left Ventricle: The left ventricular internal cavity size is normal.  Global LV systolic function was moderately decreased. Left ventricular   ejection fraction, by visualestimation, is 35 to 40%. Spectral Doppler   shows pseudonormal pattern of left ventricular myocardial filling (Grade   II diastolic dysfunction).  Right Ventricle: The right ventricular size is moderately enlarged. RV   systolic function is moderately reduced. TV S' 0.1 m/s.  Left Atrium: Severely enlarged left atrium.  Right Atrium: Moderately enlarged right atrium.  Pericardium: Trivial pericardial effusion is present.  Mitral Valve: Thickening of the anterior and posterior mitral valve   leaflets. There is mild mitral annular calcification. Mild mitral valve   regurgitation is seen.  Tricuspid Valve: The tricuspid valve is normal. Mild tricuspid   regurgitation is visualized.  Aortic Valve: The aortic valve is trileaflet. Sclerotic aorticvalve with   normal opening. Trivial aortic valve regurgitation is seen. Mobile echo   density is visualized, can not r/o vegetation (Lambl's excrescences).   Recommend GERALD if clinically correlated.  Pulmonic Valve: The pulmonic valve is normal. Mildpulmonic valve   regurgitation.  Aorta: The aortic root is normal in size and structure.  Pulmonary Artery: The pulmonary artery is of normal size and origin.  Venous: The pulmonary veins appear normal. The inferior vena cava was   normal sized, withrespiratory size variation less than 50%.       Summary:   1. Moderately decreased global left ventricular systolic function.   2. Left ventricular ejection fraction, by visual estimation, is 35 to   40%.   3. Severely enlarged left atrium.   4. Spectral Doppler shows pseudonormal pattern of left ventricular   myocardial filling (Grade II diastolic dysfunction).   5. Moderately enlarged right ventricle Moderately reduced RV systolic   function.   6. Mild mitral valve regurgitation.   7. Mild tricuspid regurgitation.   8. Mobile echo density is visualized on the aortic valve leaflets most   consistent with Lambl's excrescences, however cannot entirely exclude   vegetation. Recommend GERALD if clinically correlated.    [id number] Elian Richards Jr., MD, Electronically signed on 2/11/2019 at     < end of copied text >    CATHETERIZATION  FINDINGS:< from: Cardiac Cath Lab - Adult (03.23.18 @ 13:53) >  VENTRICLES: There were no left ventricular global or regional wall motion  abnormalities. Global left ventricular function was normal. EF calculated  by contrast ventriculography was 55 %.  VALVES: AORTIC VALVE: No significant aortic valve gradient. MITRAL VALVE:  The mitral valve exhibited trivial regurgitation (less than 1+).  CORONARY VESSELS: The coronary circulation is right dominant.  LM:   --  LM: Normal. The vessel was normal sized, not calcified, and not  tortuous. Angiography showed no evidence of disease.  LAD:   --  LAD: Normal. The vessel was normal sized, not calcified, and not  tortuous. Angiography showed minor luminal irregularities with no flow  limiting lesions.  CX:   --  Circumflex: Normal. The vessel was normal sized, not calcified,  and excessively tortuous.Angiography showed no evidence of disease.  RCA:   --  RCA: Normal. The vessel was normal sized, not calcified, and  moderately tortuous. Angiography showed no evidence of disease.  COMPLICATIONS: There were no complications. No complications occurred  during the cath lab visit.  DIAGNOSTIC IMPRESSIONS: There is mild irregularity of the coronary anatomy.  Left ventricular function is normal (LVEF=55%).  DIAGNOSTIC RECOMMENDATIONS: The patient should continue with the present  medications. Patientmanagement should include aggressive medical therapy  and resumption of all previous activities in 2 days.  Prepared and signed by  Jesús Godinez MD  Signed 03/23/2018 14:30:17    < end of copied text >        Allergies    No Known Allergies    Intolerances    MEDICATIONS  (STANDING):  amLODIPine   Tablet 2.5 milliGRAM(s) Oral daily  aspirin enteric coated 81 milliGRAM(s) Oral daily  calcitriol   Capsule 0.25 MICROGram(s) Oral daily  carvedilol 12.5 milliGRAM(s) Oral every 12 hours  cefepime   IVPB 2000 milliGRAM(s) IV Intermittent daily  dextrose 5%. 1000 milliLiter(s) (50 mL/Hr) IV Continuous <Continuous>  dextrose 50% Injectable 12.5 Gram(s) IV Push once  dextrose 50% Injectable 25 Gram(s) IV Push once  dextrose 50% Injectable 25 Gram(s) IV Push once  ferrous    sulfate 325 milliGRAM(s) Oral daily  lidocaine   Patch 1 Patch Transdermal daily  pantoprazole    Tablet 40 milliGRAM(s) Oral before breakfast  saccharomyces boulardii 250 milliGRAM(s) Oral two times a day  sacubitril 49 mG/valsartan 51 mG 1 Tablet(s) Oral two times a day  vancomycin  IVPB 1000 milliGRAM(s) IV Intermittent <User Schedule>    MEDICATIONS  (PRN):  dextrose 40% Gel 15 Gram(s) Oral once PRN Blood Glucose LESS THAN 70 milliGRAM(s)/deciliter  glucagon  Injectable 1 milliGRAM(s) IntraMuscular once PRN Glucose LESS THAN 70 milligrams/deciliter      FAMILY HISTORY:  Family history of kidney disease in brother (Sibling)      SOCIAL HISTORY:    CIGARETTES: Denies    ALCOHOL: Denies    REVIEW OF SYSTEMS:  CONSTITUTIONAL: AS PER HPI  Cardiovascular:  AS PER HPI  Respiratory:  AS PER HPI  Genitourinary:  No dysuria, no hematuria;   Gastrointestinal:   No dark color stool, no melena, no diarrhea, no constipation, no abdominal pain;   Neurological: No headache, no dizziness, no slurred speech;    Psychiatric: No agitation, no anxiety.    ALL OTHER REVIEW OF SYSTEMS ARE NEGATIVE.    Vital Signs Last 24 Hrs  T(C): 37.1 (12 Feb 2019 07:12), Max: 37.1 (12 Feb 2019 07:12)  T(F): 98.7 (12 Feb 2019 07:12), Max: 98.7 (12 Feb 2019 07:12)  HR: 73 (12 Feb 2019 07:12) (73 - 80)  BP: 148/81 (12 Feb 2019 07:12) (147/86 - 156/90)  RR: 18 (12 Feb 2019 07:12) (18 - 18)  SpO2: 91% (12 Feb 2019 07:12) (91% - 94%)    Daily     Daily     I&O's Detail    11 Feb 2019 07:01  -  12 Feb 2019 07:00  --------------------------------------------------------  IN:    Oral Fluid: 500 mL  Total IN: 500 mL    OUT:    Other: 1500 mL  Total OUT: 1500 mL    Total NET: -1000 mL      PHYSICAL EXAM:  Appearance: Normal, well nourished	  HEENT:   Normal oral mucosa, PERRL, EOMI, sclera non-icteric	  Lymphatic: No cervical lymphadenopathy  Cardiovascular: Normal S1 S2, + JVD, No cardiac murmurs, No carotid bruits, No peripheral edema  Respiratory: Lungs clear to auscultation	  Psychiatry: A & O x 3, Mood & affect appropriate  Gastrointestinal:  Soft, Non-tender, + BS, no bruits	  Skin: No rashes, No ecchymoses, No cyanosis  Neurologic: Grossly non-focal with full strength in all four extremities  Extremities: Normal range of motion, No clubbing, cyanosis or edema  Vascular: Peripheral pulses palpable 2+ bilaterally      INTERPRETATION OF TELEMETRY: NSR, PVCs    ECG:    LABS:                        10.4   3.9   )-----------( 172      ( 12 Feb 2019 05:49 )             34.6     02-12    133<L>  |  95<L>  |  23.0<H>  ----------------------------<  169<H>  4.7   |  24.0  |  3.96<H>    Ca    8.4<L>      12 Feb 2019 05:49  Phos  4.8     02-11  Mg     2.3     02-11    TPro  6.7  /  Alb  3.0<L>  /  TBili  0.5  /  DBili  x   /  AST  12  /  ALT  <5  /  AlkPhos  112  02-12            I&O's Summary    11 Feb 2019 07:01  -  12 Feb 2019 07:00  --------------------------------------------------------  IN: 500 mL / OUT: 1500 mL / NET: -1000 mL      BNP    RADIOLOGY & ADDITIONAL STUDIES: Patient is a 58y old  Female who presents with a chief complaint of sent from pulmonology office (12 Feb 2019 13:06)      HPI: 59 y/o F PMHx of ESRD on HD MWF, HFrEF (EF 35-40% TTE 02/11/19), NICM (cardiac cath 03/18 non-obstructive CAD), paroxysmal Afib (no AC due to GI bleed), uremic pericarditis s/p pericardiocentesis, right sided pleural effusion s/p pigtail catheter, IDDm, and HTN who was sent from her Pulmonologist office due to worsening right effusion vs. infiltrates. Patient has been on home O2 3L and has been having some baseline SOB, HAYES, and orthopnea. Patient states that she was having some chills, and some occasional chest pain while coughing. Patient was found upon admission to having a R loculated effusion and fluid overload being treated with HD at this time, being evaluated by IR for paracentesis and thoracentesis. Patient had TTE today that showed a mobile echodensity with on aortic valve leaflets most consistent with lambl's excrescences. Patient states she is currently resting comfortably on oxygen, states she is feeling better today.     PAST MEDICAL & SURGICAL HISTORY:  Coronary artery disease, angina presence unspecified, unspecified vessel or lesion type, unspecified whether native or transplanted heart  Paroxysmal atrial fibrillation  Anemia due to chronic kidney disease, unspecified CKD stage  Chronic systolic congestive heart failure  Pleural effusion  Pericardial effusion  End stage renal disease on dialysis  HLD (hyperlipidemia)  HTN (hypertension)  DM (diabetes mellitus)  A-V fistula  Encounter for dialysis catheter care      PREVIOUS DIAGNOSTIC TESTING:      ECHO  FINDINGS:  < from: TTE Echo Complete w/Doppler (02.11.19 @ 15:11) >     PHYSICIAN INTERPRETATION:  Left Ventricle: The left ventricular internal cavity size is normal.  Global LV systolic function was moderately decreased. Left ventricular   ejection fraction, by visualestimation, is 35 to 40%. Spectral Doppler   shows pseudonormal pattern of left ventricular myocardial filling (Grade   II diastolic dysfunction).  Right Ventricle: The right ventricular size is moderately enlarged. RV   systolic function is moderately reduced. TV S' 0.1 m/s.  Left Atrium: Severely enlarged left atrium.  Right Atrium: Moderately enlarged right atrium.  Pericardium: Trivial pericardial effusion is present.  Mitral Valve: Thickening of the anterior and posterior mitral valve   leaflets. There is mild mitral annular calcification. Mild mitral valve   regurgitation is seen.  Tricuspid Valve: The tricuspid valve is normal. Mild tricuspid   regurgitation is visualized.  Aortic Valve: The aortic valve is trileaflet. Sclerotic aorticvalve with   normal opening. Trivial aortic valve regurgitation is seen. Mobile echo   density is visualized, can not r/o vegetation (Lambl's excrescences).   Recommend GERALD if clinically correlated.  Pulmonic Valve: The pulmonic valve is normal. Mildpulmonic valve   regurgitation.  Aorta: The aortic root is normal in size and structure.  Pulmonary Artery: The pulmonary artery is of normal size and origin.  Venous: The pulmonary veins appear normal. The inferior vena cava was   normal sized, withrespiratory size variation less than 50%.       Summary:   1. Moderately decreased global left ventricular systolic function.   2. Left ventricular ejection fraction, by visual estimation, is 35 to   40%.   3. Severely enlarged left atrium.   4. Spectral Doppler shows pseudonormal pattern of left ventricular   myocardial filling (Grade II diastolic dysfunction).   5. Moderately enlarged right ventricle Moderately reduced RV systolic   function.   6. Mild mitral valve regurgitation.   7. Mild tricuspid regurgitation.   8. Mobile echo density is visualized on the aortic valve leaflets most   consistent with Lambl's excrescences, however cannot entirely exclude   vegetation. Recommend GERALD if clinically correlated.    [id number] Elian Richards Jr., MD, Electronically signed on 2/11/2019 at     < end of copied text >    CATHETERIZATION  FINDINGS:< from: Cardiac Cath Lab - Adult (03.23.18 @ 13:53) >  VENTRICLES: There were no left ventricular global or regional wall motion  abnormalities. Global left ventricular function was normal. EF calculated  by contrast ventriculography was 55 %.  VALVES: AORTIC VALVE: No significant aortic valve gradient. MITRAL VALVE:  The mitral valve exhibited trivial regurgitation (less than 1+).  CORONARY VESSELS: The coronary circulation is right dominant.  LM:   --  LM: Normal. The vessel was normal sized, not calcified, and not  tortuous. Angiography showed no evidence of disease.  LAD:   --  LAD: Normal. The vessel was normal sized, not calcified, and not  tortuous. Angiography showed minor luminal irregularities with no flow  limiting lesions.  CX:   --  Circumflex: Normal. The vessel was normal sized, not calcified,  and excessively tortuous.Angiography showed no evidence of disease.  RCA:   --  RCA: Normal. The vessel was normal sized, not calcified, and  moderately tortuous. Angiography showed no evidence of disease.  COMPLICATIONS: There were no complications. No complications occurred  during the cath lab visit.  DIAGNOSTIC IMPRESSIONS: There is mild irregularity of the coronary anatomy.  Left ventricular function is normal (LVEF=55%).  DIAGNOSTIC RECOMMENDATIONS: The patient should continue with the present  medications. Patientmanagement should include aggressive medical therapy  and resumption of all previous activities in 2 days.  Prepared and signed by  Jesús Godinez MD  Signed 03/23/2018 14:30:17    < end of copied text >        Allergies    No Known Allergies    Intolerances    MEDICATIONS  (STANDING):  amLODIPine   Tablet 2.5 milliGRAM(s) Oral daily  aspirin enteric coated 81 milliGRAM(s) Oral daily  calcitriol   Capsule 0.25 MICROGram(s) Oral daily  carvedilol 12.5 milliGRAM(s) Oral every 12 hours  cefepime   IVPB 2000 milliGRAM(s) IV Intermittent daily  dextrose 5%. 1000 milliLiter(s) (50 mL/Hr) IV Continuous <Continuous>  dextrose 50% Injectable 12.5 Gram(s) IV Push once  dextrose 50% Injectable 25 Gram(s) IV Push once  dextrose 50% Injectable 25 Gram(s) IV Push once  ferrous    sulfate 325 milliGRAM(s) Oral daily  lidocaine   Patch 1 Patch Transdermal daily  pantoprazole    Tablet 40 milliGRAM(s) Oral before breakfast  saccharomyces boulardii 250 milliGRAM(s) Oral two times a day  sacubitril 49 mG/valsartan 51 mG 1 Tablet(s) Oral two times a day  vancomycin  IVPB 1000 milliGRAM(s) IV Intermittent <User Schedule>    MEDICATIONS  (PRN):  dextrose 40% Gel 15 Gram(s) Oral once PRN Blood Glucose LESS THAN 70 milliGRAM(s)/deciliter  glucagon  Injectable 1 milliGRAM(s) IntraMuscular once PRN Glucose LESS THAN 70 milligrams/deciliter      FAMILY HISTORY:  Family history of kidney disease in brother (Sibling)      SOCIAL HISTORY:    CIGARETTES: Denies    ALCOHOL: Denies    REVIEW OF SYSTEMS:  CONSTITUTIONAL: AS PER HPI  Cardiovascular:  AS PER HPI  Respiratory:  AS PER HPI  Genitourinary:  No dysuria, no hematuria;   Gastrointestinal:   No dark color stool, no melena, no diarrhea, no constipation, no abdominal pain;   Neurological: No headache, no dizziness, no slurred speech;    Psychiatric: No agitation, no anxiety.    ALL OTHER REVIEW OF SYSTEMS ARE NEGATIVE.    Vital Signs Last 24 Hrs  T(C): 37.1 (12 Feb 2019 07:12), Max: 37.1 (12 Feb 2019 07:12)  T(F): 98.7 (12 Feb 2019 07:12), Max: 98.7 (12 Feb 2019 07:12)  HR: 73 (12 Feb 2019 07:12) (73 - 80)  BP: 148/81 (12 Feb 2019 07:12) (147/86 - 156/90)  RR: 18 (12 Feb 2019 07:12) (18 - 18)  SpO2: 91% (12 Feb 2019 07:12) (91% - 94%)    Daily     Daily     I&O's Detail    11 Feb 2019 07:01  -  12 Feb 2019 07:00  --------------------------------------------------------  IN:    Oral Fluid: 500 mL  Total IN: 500 mL    OUT:    Other: 1500 mL  Total OUT: 1500 mL    Total NET: -1000 mL      PHYSICAL EXAM:  Appearance: Normal, well nourished	  HEENT:   Normal oral mucosa, PERRL, EOMI, sclera non-icteric	  Lymphatic: No cervical lymphadenopathy  Cardiovascular: Normal S1 S2, + JVD, No cardiac murmurs, No carotid bruits, No peripheral edema  Respiratory: Lungs clear to auscultation	  Psychiatry: A & O x 3, Mood & affect appropriate  Gastrointestinal:  Soft, Non-tender, + BS, no bruits	  Skin: No rashes, No ecchymoses, No cyanosis  Neurologic: Grossly non-focal with full strength in all four extremities  Extremities: Normal range of motion, No clubbing, cyanosis or edema  Vascular: Peripheral pulses palpable 2+ bilaterally      INTERPRETATION OF TELEMETRY: NSR, PVCs    ECG:    LABS:                        10.4   3.9   )-----------( 172      ( 12 Feb 2019 05:49 )             34.6     02-12    133<L>  |  95<L>  |  23.0<H>  ----------------------------<  169<H>  4.7   |  24.0  |  3.96<H>    Ca    8.4<L>      12 Feb 2019 05:49  Phos  4.8     02-11  Mg     2.3     02-11    TPro  6.7  /  Alb  3.0<L>  /  TBili  0.5  /  DBili  x   /  AST  12  /  ALT  <5  /  AlkPhos  112  02-12      I&O's Summary    11 Feb 2019 07:01  -  12 Feb 2019 07:00  --------------------------------------------------------  IN: 500 mL / OUT: 1500 mL / NET: -1000 mL      BNP    RADIOLOGY & ADDITIONAL STUDIES: Patient is a 58y old  Female who presents with a chief complaint of sent from pulmonology office (12 Feb 2019 13:06)      HPI: 59 y/o F PMHx of ESRD on HD MWF, HFrEF (EF 35-40% TTE 02/11/19), NICM (cardiac cath 03/18 non-obstructive CAD), paroxysmal Afib (no AC due to GI bleed), uremic pericarditis s/p pericardiocentesis, right sided pleural effusion s/p pigtail catheter, IDDm, and HTN who was sent from her Pulmonologist office due to worsening right effusion vs. infiltrates. Patient has been on home O2 3L and has been having some baseline SOB, HAYES, and orthopnea. Patient states that she was having some chills, and some occasional chest pain while coughing. Patient was found upon admission to having a R loculated effusion and fluid overload being treated with HD at this time, being evaluated by IR for paracentesis and thoracentesis. Patient had TTE today that showed a mobile echodensity with on aortic valve leaflets most consistent with lambl's excrescences and EF that dropped to 35-40%. Patient states she is currently resting comfortably on oxygen, states she is feeling better today.     PAST MEDICAL & SURGICAL HISTORY:  Coronary artery disease, angina presence unspecified, unspecified vessel or lesion type, unspecified whether native or transplanted heart  Paroxysmal atrial fibrillation  Anemia due to chronic kidney disease, unspecified CKD stage  Chronic systolic congestive heart failure  Pleural effusion  Pericardial effusion  End stage renal disease on dialysis  HLD (hyperlipidemia)  HTN (hypertension)  DM (diabetes mellitus)  A-V fistula  Encounter for dialysis catheter care      PREVIOUS DIAGNOSTIC TESTING:      ECHO  FINDINGS:  < from: TTE Echo Complete w/Doppler (02.11.19 @ 15:11) >     PHYSICIAN INTERPRETATION:  Left Ventricle: The left ventricular internal cavity size is normal.  Global LV systolic function was moderately decreased. Left ventricular   ejection fraction, by visualestimation, is 35 to 40%. Spectral Doppler   shows pseudonormal pattern of left ventricular myocardial filling (Grade   II diastolic dysfunction).  Right Ventricle: The right ventricular size is moderately enlarged. RV   systolic function is moderately reduced. TV S' 0.1 m/s.  Left Atrium: Severely enlarged left atrium.  Right Atrium: Moderately enlarged right atrium.  Pericardium: Trivial pericardial effusion is present.  Mitral Valve: Thickening of the anterior and posterior mitral valve   leaflets. There is mild mitral annular calcification. Mild mitral valve   regurgitation is seen.  Tricuspid Valve: The tricuspid valve is normal. Mild tricuspid   regurgitation is visualized.  Aortic Valve: The aortic valve is trileaflet. Sclerotic aorticvalve with   normal opening. Trivial aortic valve regurgitation is seen. Mobile echo   density is visualized, can not r/o vegetation (Lambl's excrescences).   Recommend GERALD if clinically correlated.  Pulmonic Valve: The pulmonic valve is normal. Mildpulmonic valve   regurgitation.  Aorta: The aortic root is normal in size and structure.  Pulmonary Artery: The pulmonary artery is of normal size and origin.  Venous: The pulmonary veins appear normal. The inferior vena cava was   normal sized, withrespiratory size variation less than 50%.       Summary:   1. Moderately decreased global left ventricular systolic function.   2. Left ventricular ejection fraction, by visual estimation, is 35 to   40%.   3. Severely enlarged left atrium.   4. Spectral Doppler shows pseudonormal pattern of left ventricular   myocardial filling (Grade II diastolic dysfunction).   5. Moderately enlarged right ventricle Moderately reduced RV systolic   function.   6. Mild mitral valve regurgitation.   7. Mild tricuspid regurgitation.   8. Mobile echo density is visualized on the aortic valve leaflets most   consistent with Lambl's excrescences, however cannot entirely exclude   vegetation. Recommend GERALD if clinically correlated.    [id number] Elian Richards Jr., MD, Electronically signed on 2/11/2019 at     < end of copied text >    CATHETERIZATION  FINDINGS:< from: Cardiac Cath Lab - Adult (03.23.18 @ 13:53) >  VENTRICLES: There were no left ventricular global or regional wall motion  abnormalities. Global left ventricular function was normal. EF calculated  by contrast ventriculography was 55 %.  VALVES: AORTIC VALVE: No significant aortic valve gradient. MITRAL VALVE:  The mitral valve exhibited trivial regurgitation (less than 1+).  CORONARY VESSELS: The coronary circulation is right dominant.  LM:   --  LM: Normal. The vessel was normal sized, not calcified, and not  tortuous. Angiography showed no evidence of disease.  LAD:   --  LAD: Normal. The vessel was normal sized, not calcified, and not  tortuous. Angiography showed minor luminal irregularities with no flow  limiting lesions.  CX:   --  Circumflex: Normal. The vessel was normal sized, not calcified,  and excessively tortuous.Angiography showed no evidence of disease.  RCA:   --  RCA: Normal. The vessel was normal sized, not calcified, and  moderately tortuous. Angiography showed no evidence of disease.  COMPLICATIONS: There were no complications. No complications occurred  during the cath lab visit.  DIAGNOSTIC IMPRESSIONS: There is mild irregularity of the coronary anatomy.  Left ventricular function is normal (LVEF=55%).  DIAGNOSTIC RECOMMENDATIONS: The patient should continue with the present  medications. Patientmanagement should include aggressive medical therapy  and resumption of all previous activities in 2 days.  Prepared and signed by  Jesús Godinez MD  Signed 03/23/2018 14:30:17    < end of copied text >        Allergies    No Known Allergies    Intolerances    MEDICATIONS  (STANDING):  amLODIPine   Tablet 2.5 milliGRAM(s) Oral daily  aspirin enteric coated 81 milliGRAM(s) Oral daily  calcitriol   Capsule 0.25 MICROGram(s) Oral daily  carvedilol 12.5 milliGRAM(s) Oral every 12 hours  cefepime   IVPB 2000 milliGRAM(s) IV Intermittent daily  dextrose 5%. 1000 milliLiter(s) (50 mL/Hr) IV Continuous <Continuous>  dextrose 50% Injectable 12.5 Gram(s) IV Push once  dextrose 50% Injectable 25 Gram(s) IV Push once  dextrose 50% Injectable 25 Gram(s) IV Push once  ferrous    sulfate 325 milliGRAM(s) Oral daily  lidocaine   Patch 1 Patch Transdermal daily  pantoprazole    Tablet 40 milliGRAM(s) Oral before breakfast  saccharomyces boulardii 250 milliGRAM(s) Oral two times a day  sacubitril 49 mG/valsartan 51 mG 1 Tablet(s) Oral two times a day  vancomycin  IVPB 1000 milliGRAM(s) IV Intermittent <User Schedule>    MEDICATIONS  (PRN):  dextrose 40% Gel 15 Gram(s) Oral once PRN Blood Glucose LESS THAN 70 milliGRAM(s)/deciliter  glucagon  Injectable 1 milliGRAM(s) IntraMuscular once PRN Glucose LESS THAN 70 milligrams/deciliter      FAMILY HISTORY:  Family history of kidney disease in brother (Sibling)      SOCIAL HISTORY:    CIGARETTES: Denies    ALCOHOL: Denies    REVIEW OF SYSTEMS:  CONSTITUTIONAL: AS PER HPI  Cardiovascular:  AS PER HPI  Respiratory:  AS PER HPI  Genitourinary:  No dysuria, no hematuria;   Gastrointestinal:   No dark color stool, no melena, no diarrhea, no constipation, no abdominal pain;   Neurological: No headache, no dizziness, no slurred speech;    Psychiatric: No agitation, no anxiety.    ALL OTHER REVIEW OF SYSTEMS ARE NEGATIVE.    Vital Signs Last 24 Hrs  T(C): 37.1 (12 Feb 2019 07:12), Max: 37.1 (12 Feb 2019 07:12)  T(F): 98.7 (12 Feb 2019 07:12), Max: 98.7 (12 Feb 2019 07:12)  HR: 73 (12 Feb 2019 07:12) (73 - 80)  BP: 148/81 (12 Feb 2019 07:12) (147/86 - 156/90)  RR: 18 (12 Feb 2019 07:12) (18 - 18)  SpO2: 91% (12 Feb 2019 07:12) (91% - 94%)    Daily     Daily     I&O's Detail    11 Feb 2019 07:01  -  12 Feb 2019 07:00  --------------------------------------------------------  IN:    Oral Fluid: 500 mL  Total IN: 500 mL    OUT:    Other: 1500 mL  Total OUT: 1500 mL    Total NET: -1000 mL      PHYSICAL EXAM:  Appearance: Normal, well nourished	  HEENT:   Normal oral mucosa, PERRL, EOMI, sclera non-icteric	  Lymphatic: No cervical lymphadenopathy  Cardiovascular: Normal S1 S2, + JVD, No cardiac murmurs, No carotid bruits, 1+ B/L LE edema   Respiratory: Diffuse rales, R>L  Psychiatry: A & O x 3, Mood & affect appropriate  Gastrointestinal:  Soft, Non-tender, + BS, no bruits, + ascites	  Skin: No rashes, No ecchymoses, No cyanosis  Neurologic: Grossly non-focal with full strength in all four extremities  Extremities: Normal range of motion, No clubbing, cyanosis or edema  Vascular: Peripheral pulses palpable 2+ bilaterally      INTERPRETATION OF TELEMETRY: NSR, PVCs    ECG: ST, LVH, nonspecific T wave abnormalities     LABS:                        10.4   3.9   )-----------( 172      ( 12 Feb 2019 05:49 )             34.6     02-12    133<L>  |  95<L>  |  23.0<H>  ----------------------------<  169<H>  4.7   |  24.0  |  3.96<H>    Ca    8.4<L>      12 Feb 2019 05:49  Phos  4.8     02-11  Mg     2.3     02-11    TPro  6.7  /  Alb  3.0<L>  /  TBili  0.5  /  DBili  x   /  AST  12  /  ALT  <5  /  AlkPhos  112  02-12      I&O's Summary    11 Feb 2019 07:01  -  12 Feb 2019 07:00  --------------------------------------------------------  IN: 500 mL / OUT: 1500 mL / NET: -1000 mL      BNP    RADIOLOGY & ADDITIONAL STUDIES:  < from: CT Abdomen and Pelvis No Cont (02.09.19 @ 11:54) >     EXAM:  CT ABDOMEN AND PELVIS                         EXAM:  CT CHEST                          PROCEDURE DATE:  02/09/2019          INTERPRETATION:  Clinical Information: Dyspnea    Comparison: 12/31/2018    Procedure:  CT of the Chest, Abdomen andPelvis was performed without intravenous   contrast.   Intravenous contrast: None.  Oral contrast: None.  Sagittal and coronal reformats were performed.    Findings:    Chest:    Airways, pleura, lungs: Central airways patent. Trace left pleural   effusion. Small-moderate loculated right pleural effusion. And perihilar   consolidation of unclear etiology. Interlobular septal thickening on the   right of unclear etiology.  Vasculature: Unremarkable.  Mediastinum and vishal: Severe cardiomegaly. Pericardium and esophagus   unremarkable.  Chest wall and imaged neck: Unremarkable.  Neuromusculoskeletal: Unremarkable.    Abdomen/pelvis:     Hepatobiliary: The liver, gallbladder, and biliary tree are unremarkable  Spleen: Unremarkable  Pancreas: Unremarkable  Adrenal glands: Unremarkable  Urinary: The kidneys, ureters, and urinary bladder are unremarkable  Reproductive: The uterus and adnexa are unremarkable.  Gastrointestinal: Very limited evaluation of the bowel due to lack of   intravenous contrast, oral contrast, and presence of ascites which   obscures the fat/bowel interface. No bowel obstruction.  Peritoneum: Small-moderate ascites. No free air.  Vasculature: Unremarkable  Retroperitoneum: Unremarkable  Abdominal wall: Unremarkable  Neuromusculoskeletal: Unremarkable    Impression:   -Right perihilar consolidation and right interlobular septal thickening;   differential diagnosis includes malignancy and pneumonia.  -Small-moderate right pleural effusion is loculated, differential   diagnosis includes malignancy, parapneumonic effusion, or empyema.  -Severe global cardiomegaly.  -Small-moderate ascites, unclear etiology.      LUIS ARMANDO CARRERA M.D., ATTENDING RADIOLOGIST  This document has been electronically signed. Feb 9 2019  1:11PM        < end of copied text >

## 2019-02-12 NOTE — PROGRESS NOTE ADULT - SUBJECTIVE AND OBJECTIVE BOX
58y Female,   Patient is a 58y old  Female who presents with a chief complaint of sent from pulmonology office (2019 20:39)      HOSPITALIZATION DAY:  [facility] 2GUL 2326    Antibiotic:     Vanco /Cefepime                              Started on:   2019    INTERVAL/OVERNIGHT EVENTS:    Patient examined at beside.  As per nurse, no acute events overnight.   As per patient, states felling the same compared to previous day. States she had an episode of pain on her feet during the night radiated to her legs, patient states she has presented this pain in the past.   Patient is NPO since midnight, denies nausea/vomiting/diarrhea/abdominal pain. Patient is still voiding small quantity of urine w/o change from baseline. Last BM yesterday. Patient is not ambulating.   Patient denies chest pain, calf pain, abdominal pain, headaches, fever, chills, dysuria, hematuria, diarrhea, constipation, SOB, palpitations.   Rest of ROS not contributory except for above.      CARDIAC MONITOR  OXYGEN:  Cannula   2 L/min     O2 Sat:   94%    I&O's Summary    2019 07:01  -  2019 07:00  --------------------------------------------------------  IN: 500 mL / OUT: 1500 mL / NET: -1000 mL      Daily Weight in k.1 (2019 08:07)    CAPILLARY BLOOD GLUCOSE  POCT Blood Glucose.: 88 mg/dL (2019 08:20)    Vital Signs Last 24 Hrs  T(C): 36.7 (2019 23:11), Max: 36.9 (2019 08:07)  T(F): 98 (2019 23:11), Max: 98.5 (2019 08:07)  HR: 79 (2019 04:58) (72 - 80)  BP: 156/79 (2019 04:58) (130/59 - 156/90)  RR: 18 (2019 23:11) (18 - 18)  SpO2: 94% (2019 23:11) (94% - 96%)    PHYSICAL EXAM: Vital signs reviewed.  GENERAL: Not in acute distress, tolerating decubitus. Alert and oriented x3 . Cooperative.   HEENT: moist mucous membranes. Clear conjunctiva   NECK: Supple.  CARDIOVASCULAR: Normal S1 and S2, No murmur, RRR.  RESPIRATORY: B/l crackles present predominantly on R side, improved from previous day  GASTROINTESTINAL: Soft, nontender. Mildly distended.   EXTREMITIES: No cyanosis or edema. Distal pulses wnl. B/l pedal +1 edema.   SKIN: warm and dry .   NEURO: Alert/oriented x 3. No focal signs on exam.       LABS                          10.4   3.9   )-----------( 172      ( 2019 05:49 )             34.6         02-12    133<L>  |  95<L>  |  23.0<H>  ----------------------------<  169<H>  4.7   |  24.0  |  3.96<H>    Ca    8.4<L>      2019 05:49  Phos  4.8     02-11  Mg     2.3     02-11    TPro  6.7  /  Alb  3.0<L>  /  TBili  0.5  /  DBili  x   /  AST  12  /  ALT  <5  /  AlkPhos  112  02-12      LIVER FUNCTIONS - ( 2019 05:49 )  Alb: 3.0 g/dL / Pro: 6.7 g/dL / ALK PHOS: 112 U/L / ALT: <5 U/L / AST: 12 U/L / GGT: x             Culture - Blood (collected 2019 13:04)  Source: .Blood  Preliminary Report (2019 15:00):    No growth at 48 hours    Culture - Blood (collected 2019 13:04)  Source: .Blood  Preliminary Report (2019 15:00):    No growth at 48 hours    DIET:    MEDICATIONS  (STANDING):  amLODIPine   Tablet 2.5 milliGRAM(s) Oral daily  aspirin enteric coated 81 milliGRAM(s) Oral daily  calcitriol   Capsule 0.25 MICROGram(s) Oral daily  carvedilol 12.5 milliGRAM(s) Oral every 12 hours  cefepime   IVPB 2000 milliGRAM(s) IV Intermittent daily  dextrose 5%. 1000 milliLiter(s) (50 mL/Hr) IV Continuous <Continuous>  dextrose 50% Injectable 12.5 Gram(s) IV Push once  dextrose 50% Injectable 25 Gram(s) IV Push once  dextrose 50% Injectable 25 Gram(s) IV Push once  ferrous    sulfate 325 milliGRAM(s) Oral daily  lidocaine   Patch 1 Patch Transdermal daily  pantoprazole    Tablet 40 milliGRAM(s) Oral before breakfast  saccharomyces boulardii 250 milliGRAM(s) Oral two times a day  sacubitril 49 mG/valsartan 51 mG 1 Tablet(s) Oral two times a day  vancomycin  IVPB 1000 milliGRAM(s) IV Intermittent every 48 hours    MEDICATIONS  (PRN):  dextrose 40% Gel 15 Gram(s) Oral once PRN Blood Glucose LESS THAN 70 milliGRAM(s)/deciliter  glucagon  Injectable 1 milliGRAM(s) IntraMuscular once PRN Glucose LESS THAN 70 milligrams/deciliter 58y Female,   Patient is a 58y old  Female who presents with a chief complaint of sent from pulmonology office (2019 20:39)      HOSPITALIZATION DAY:  [facility] 2GUL 2326    Antibiotic:     Vanco /Cefepime                              Started on:   2019    INTERVAL/OVERNIGHT EVENTS:    Patient examined at beside.  As per nurse, no acute events overnight.   As per patient, states felling the same compared to previous day. States she had an episode of pain on her feet during the night radiated to her legs, patient states she has presented this pain in the past.   Patient is NPO since midnight, denies nausea/vomiting/diarrhea/abdominal pain. Patient is still voiding small quantity of urine w/o change from baseline. Last BM yesterday. Patient is not ambulating.   Patient denies chest pain, calf pain, abdominal pain, headaches, fever, chills, dysuria, hematuria, diarrhea, constipation, SOB, palpitations.   Rest of ROS not contributory except for above.      CARDIAC MONITOR  OXYGEN:  Cannula   2 L/min     O2 Sat:   94%    I&O's Summary    2019 07:01  -  2019 07:00  --------------------------------------------------------  IN: 500 mL / OUT: 1500 mL / NET: -1000 mL      Daily Weight in k.1 (2019 08:07)    CAPILLARY BLOOD GLUCOSE  POCT Blood Glucose.: 88 mg/dL (2019 08:20)    Vital Signs Last 24 Hrs  T(C): 36.7 (2019 23:11), Max: 36.9 (2019 08:07)  T(F): 98 (2019 23:11), Max: 98.5 (2019 08:07)  HR: 79 (2019 04:58) (72 - 80)  BP: 156/79 (2019 04:58) (130/59 - 156/90)  RR: 18 (2019 23:11) (18 - 18)  SpO2: 94% (2019 23:11) (94% - 96%)    PHYSICAL EXAM: Vital signs reviewed.  GENERAL: Not in acute distress, tolerating decubitus. Alert and oriented x3 . Cooperative.   HEENT: moist mucous membranes. Clear conjunctiva   NECK: Supple.  CARDIOVASCULAR: Normal S1 and S2, No murmur, RRR.  RESPIRATORY: B/l crackles present predominantly on R side, improved from previous day  GASTROINTESTINAL: Soft, nontender. Mildly distended.   EXTREMITIES: No cyanosis or edema. Distal pulses wnl. B/l pedal +1 edema.   SKIN: warm and dry .   NEURO: Alert/oriented x 3. No focal signs on exam.       LABS                          10.4   3.9   )-----------( 172      ( 2019 05:49 )             34.6         02-12    133<L>  |  95<L>  |  23.0<H>  ----------------------------<  169<H>  4.7   |  24.0  |  3.96<H>    Ca    8.4<L>      2019 05:49  Phos  4.8     11  Mg     2.3     11    TPro  6.7  /  Alb  3.0<L>  /  TBili  0.5  /  DBili  x   /  AST  12  /  ALT  <5  /  AlkPhos  112  0212      LIVER FUNCTIONS - ( 2019 05:49 )  Alb: 3.0 g/dL / Pro: 6.7 g/dL / ALK PHOS: 112 U/L / ALT: <5 U/L / AST: 12 U/L / GGT: x             Culture - Blood (collected 2019 13:04)  Source: .Blood  Preliminary Report (2019 15:00):    No growth at 48 hours    Culture - Blood (collected 2019 13:04)  Source: .Blood  Preliminary Report (2019 15:00):    No growth at 48 hours    < from: TTE Echo Complete w/Doppler (19 @ 15:11) >  Summary:   1. Moderately decreased global left ventricular systolic function.   2. Left ventricular ejection fraction, by visual estimation, is 35 to   40%.   3. Severely enlarged left atrium.   4. Spectral Doppler shows pseudonormal pattern of left ventricular   myocardial filling (Grade II diastolic dysfunction).   5. Moderately enlarged right ventricle Moderately reduced RV systolic   function.   6. Mild mitral valve regurgitation.   7. Mild tricuspid regurgitation.   8. Mobile echo density is visualized on the aortic valve leaflets most   consistent with Lambl's excrescences, however cannot entirely exclude   vegetation. Recommend GERALD if clinically correlated.    < end of copied text >      DIET:    MEDICATIONS  (STANDING):  amLODIPine   Tablet 2.5 milliGRAM(s) Oral daily  aspirin enteric coated 81 milliGRAM(s) Oral daily  calcitriol   Capsule 0.25 MICROGram(s) Oral daily  carvedilol 12.5 milliGRAM(s) Oral every 12 hours  cefepime   IVPB 2000 milliGRAM(s) IV Intermittent daily  dextrose 5%. 1000 milliLiter(s) (50 mL/Hr) IV Continuous <Continuous>  dextrose 50% Injectable 12.5 Gram(s) IV Push once  dextrose 50% Injectable 25 Gram(s) IV Push once  dextrose 50% Injectable 25 Gram(s) IV Push once  ferrous    sulfate 325 milliGRAM(s) Oral daily  lidocaine   Patch 1 Patch Transdermal daily  pantoprazole    Tablet 40 milliGRAM(s) Oral before breakfast  saccharomyces boulardii 250 milliGRAM(s) Oral two times a day  sacubitril 49 mG/valsartan 51 mG 1 Tablet(s) Oral two times a day  vancomycin  IVPB 1000 milliGRAM(s) IV Intermittent every 48 hours    MEDICATIONS  (PRN):  dextrose 40% Gel 15 Gram(s) Oral once PRN Blood Glucose LESS THAN 70 milliGRAM(s)/deciliter  glucagon  Injectable 1 milliGRAM(s) IntraMuscular once PRN Glucose LESS THAN 70 milligrams/deciliter 58y Female,   Patient is a 58y old  Female who presents with a chief complaint of sent from pulmonology office (2019 20:39) continues with anasarca and sob - mild improvement       HOSPITALIZATION DAY:  [facility] 2GUL 2326    Antibiotic:     Vanco /Cefepime                              Started on:   2019    INTERVAL/OVERNIGHT EVENTS:    Patient examined at beside.  As per nurse, no acute events overnight.   As per patient, states felling the same compared to previous day. States she had an episode of pain on her feet during the night radiated to her legs, patient states she has presented this pain in the past.   Patient is NPO since midnight, denies nausea/vomiting/diarrhea/abdominal pain. Patient is still voiding small quantity of urine w/o change from baseline. Last BM yesterday. Patient is not ambulating.   Patient denies chest pain, calf pain, abdominal pain, headaches, fever, chills, dysuria, hematuria, diarrhea, constipation, SOB, palpitations.   Rest of ROS not contributory except for above.      CARDIAC MONITOR  OXYGEN:  Cannula   2 L/min     O2 Sat:   94%    I&O's Summary    2019 07:01  -  2019 07:00  --------------------------------------------------------  IN: 500 mL / OUT: 1500 mL / NET: -1000 mL      Daily Weight in k.1 (2019 08:07)    CAPILLARY BLOOD GLUCOSE  POCT Blood Glucose.: 88 mg/dL (2019 08:20)    Vital Signs Last 24 Hrs  T(C): 36.7 (2019 23:11), Max: 36.9 (2019 08:07)  T(F): 98 (2019 23:11), Max: 98.5 (2019 08:07)  HR: 79 (2019 04:58) (72 - 80)  BP: 156/79 (2019 04:58) (130/59 - 156/90)  RR: 18 (2019 23:11) (18 - 18)  SpO2: 94% (2019 23:11) (94% - 96%)    PHYSICAL EXAM: Vital signs reviewed.  GENERAL: Not in acute distress, tolerating decubitus. Alert and oriented x3 . Cooperative.   HEENT: moist mucous membranes. Clear conjunctiva   NECK: Supple.  CARDIOVASCULAR: Normal S1 and S2, No murmur, RRR.  RESPIRATORY: B/l crackles present predominantly on R side, improved from previous day  GASTROINTESTINAL: Soft, nontender. Mildly distended.   EXTREMITIES: No cyanosis, peripehral edema +1 b/l le   AVF +thrill     SKIN: warm and dry .   NEURO: Alert/oriented x 3. No focal signs on exam.       LABS                          10.4   3.9   )-----------( 172      ( 2019 05:49 )             34.6         02-12    133<L>  |  95<L>  |  23.0<H>  ----------------------------<  169<H>  4.7   |  24.0  |  3.96<H>    Ca    8.4<L>      2019 05:49  Phos  4.8     02-11  Mg     2.3     02-11    TPro  6.7  /  Alb  3.0<L>  /  TBili  0.5  /  DBili  x   /  AST  12  /  ALT  <5  /  AlkPhos  112  02-12      LIVER FUNCTIONS - ( 2019 05:49 )  Alb: 3.0 g/dL / Pro: 6.7 g/dL / ALK PHOS: 112 U/L / ALT: <5 U/L / AST: 12 U/L / GGT: x             Culture - Blood (collected 2019 13:04)  Source: .Blood  Preliminary Report (2019 15:00):    No growth at 48 hours    Culture - Blood (collected 2019 13:04)  Source: .Blood  Preliminary Report (2019 15:00):    No growth at 48 hours    < from: TTE Echo Complete w/Doppler (19 @ 15:11) >  Summary:   1. Moderately decreased global left ventricular systolic function.   2. Left ventricular ejection fraction, by visual estimation, is 35 to   40%.   3. Severely enlarged left atrium.   4. Spectral Doppler shows pseudonormal pattern of left ventricular   myocardial filling (Grade II diastolic dysfunction).   5. Moderately enlarged right ventricle Moderately reduced RV systolic   function.   6. Mild mitral valve regurgitation.   7. Mild tricuspid regurgitation.   8. Mobile echo density is visualized on the aortic valve leaflets most   consistent with Lambl's excrescences, however cannot entirely exclude   vegetation. Recommend GERALD if clinically correlated.    < end of copied text >      DIET:    MEDICATIONS  (STANDING):  amLODIPine   Tablet 2.5 milliGRAM(s) Oral daily  aspirin enteric coated 81 milliGRAM(s) Oral daily  calcitriol   Capsule 0.25 MICROGram(s) Oral daily  carvedilol 12.5 milliGRAM(s) Oral every 12 hours  cefepime   IVPB 2000 milliGRAM(s) IV Intermittent daily  dextrose 5%. 1000 milliLiter(s) (50 mL/Hr) IV Continuous <Continuous>  dextrose 50% Injectable 12.5 Gram(s) IV Push once  dextrose 50% Injectable 25 Gram(s) IV Push once  dextrose 50% Injectable 25 Gram(s) IV Push once  ferrous    sulfate 325 milliGRAM(s) Oral daily  lidocaine   Patch 1 Patch Transdermal daily  pantoprazole    Tablet 40 milliGRAM(s) Oral before breakfast  saccharomyces boulardii 250 milliGRAM(s) Oral two times a day  sacubitril 49 mG/valsartan 51 mG 1 Tablet(s) Oral two times a day  vancomycin  IVPB 1000 milliGRAM(s) IV Intermittent every 48 hours    MEDICATIONS  (PRN):  dextrose 40% Gel 15 Gram(s) Oral once PRN Blood Glucose LESS THAN 70 milliGRAM(s)/deciliter  glucagon  Injectable 1 milliGRAM(s) IntraMuscular once PRN Glucose LESS THAN 70 milligrams/deciliter

## 2019-02-12 NOTE — PHARMACOTHERAPY INTERVENTION NOTE - COMMENTS
Patient received vancomycin 1g on 2/10. Dialysis on 2/11. Vancomycin trough on 2/12 is 10.2, a supplemental dose of vancomycin 750 mG given today (2/12). Vancomycin order changed to reflect dialysis schedule (M W F). Trough ordered for 2/13 pre-dialysis. Patient received vancomycin 1g on 2/10. Dialysis on 2/11. Vancomycin trough on 2/12 is 10.2, a supplemental dose of vancomycin 1g given today (2/12). Vancomycin order changed to reflect dialysis schedule (M W F). Trough ordered for 2/13 pre-dialysis.

## 2019-02-12 NOTE — PROGRESS NOTE ADULT - ASSESSMENT
59 yo female with hx of ESRD on HD M-W-F, DM2, PAF, NICM / chronic diastolic HF with recent hospitalization in [facility] with positive Influenza A complicated with right pleural effusion requiring thoracocentesis and discharged home on supplemental O2 3L/min, presenting to ED referred from Pulmonologist after finding of loculated R pleural effusion on CT chest of unclear etiology, associated with dyspnea on exertion and noted peripheral edema, with moderate ascites. Pt receiving HD to offload fluid. CT sx consulted and pt pending IR guided thoracocentesis for further evaluation and analysis of fluid. Pulm continues to follow the pt.     Dyspnea on excertion likely 2/2 loculated R pleural effusion with concomitant fluid overload noted peripheral edema/ ascites in the setting ESRD/ CHF   - Patient  remains afebrile w/o leucocytosis   - SOB is persist but improved compared to admission.   - elevated procalcitonin/ esr   -Negative RF/ HIV - F/u  quantiferon  - c/w Vanco and cefepime empirically   - c/w Florastor while on ABX  - Blood culture negative x 2   - F/u sputum culture. (Not taken yet)   - c/w Supplemental O2 as needed  - repeat cxr post HD ordered by Pulm, with minimal improvement.   - c/w offloading excess fluid with HD.  - C/w antibiotics as per ID recommendation, sputum culture is pending for collection.  - Pulmonology cf/u noted and appreciated    - CT sx and IR consulted on the case. Ir recommends no to tap as amount of fluid is small. 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  - [place] cardiology consulted for probable GERALD.    ESRD on HD   - s/p HD: 02/11/2019  - Upon admission noted hyperkalemia reversed with insulin/ dextrose and Kayexalate. Thereafter with HD   - Stable K+ at 4.9 this am   - will monitor electrolytes closely   - c/w offloading fluid with HD   - C/W HD on M / W / F schedule  - c/w calcitriol 0.25mg po qd   - Nephro f/u noted and appreciated     DM2  - Last HbA1C: 6.2 on 01/01/2019  - fs low normal  and had episode of hypoglycemia on 2/10/19   - [place] HSS coverage   - c/w hypoglycemia protocol     HTN  - bp stable   -c/w Coreg 12.5mg BID PO  - c/w Amlodipine 2.5 QD PO  - c/w Entresto 49/51 PO QD    Anemia in ESRD  - h/h stable  - c/w monitoring h/h     Prophylactic measure:  Holding AC in anticipation of IR guided intervention in the am   Florastor while on Abx  Omeprazol 40mg PO    Advanced Directive: Full code     Dispo: PT consulted and awaiting further recommendations. 57 yo female with hx of ESRD on HD M-W-F, DM2, PAF, NICM / chronic diastolic HF with recent hospitalization in [facility] with positive Influenza A complicated with right pleural effusion requiring thoracocentesis and discharged home on supplemental O2 3L/min, presenting to ED referred from Pulmonologist after finding of loculated R pleural effusion on CT chest of unclear etiology, associated with dyspnea on exertion and noted peripheral edema, with moderate ascites. Pt receiving HD to offload fluid 2/11 and will have another session today. CT sx/ IR and pulm consulted for possible drainage of the R loculated effusion for further evaluation of etiology, but given overall fluid overload state will c/w offloading fluid with HD and then consider further intervention with paracentesis/ thoracocentesis.     Dyspnea on exertion likely 2/2 loculated R pleural effusion with concomitant fluid overload noted peripheral edema/ ascites in the setting ESRD/ CHF   - Patient  remains afebrile w/o leucocytosis   - SOB is persistent  but slight improved from yest   - elevated procalcitonin/ esr   -Negative RF/ HIV - F/u  quantiferon  - c/w Vanco and cefepime empirically   - c/w Florastor while on ABX  - Blood culture negative x 2   - F/u sputum culture. (Not taken yet)   - c/w Supplemental O2 as needed  - repeat cxr post HD ordered by Pulm, with minimal improvement.   - c/w offloading excess fluid with HD.  - C/w antibiotics as per ID recommendation, sputum culture is pending for collection.  - Pulmonology cf/u noted and appreciated    - CT sx and IR consulted on the case. Ir recommends no to tap as amount of fluid is small. 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  - [place] cardiology consulted for probable GERALD.    ESRD on HD   - s/p HD: 02/11/2019  - Upon admission noted hyperkalemia reversed with insulin/ dextrose and Kayexalate. Thereafter with HD   - Stable K+ at 4.9 this am   - will monitor electrolytes closely   - c/w offloading fluid with HD   - C/W HD on M / W / F schedule  - c/w calcitriol 0.25mg po qd   - Nephro f/u noted and appreciated     DM2  - Last HbA1C: 6.2 on 01/01/2019  - fs low normal  and had episode of hypoglycemia on 2/10/19   - [place] HSS coverage   - c/w hypoglycemia protocol     HTN  - bp stable   -c/w Coreg 12.5mg BID PO  - c/w Amlodipine 2.5 QD PO  - c/w Entresto 49/51 PO QD    Anemia in ESRD  - h/h stable  - c/w monitoring h/h     Prophylactic measure:  Holding AC in anticipation of IR guided intervention in the am   Florastor while on Abx  Omeprazol 40mg PO    Advanced Directive: Full code     Dispo: PT consulted and awaiting further recommendations. 57 yo female with hx of ESRD on HD M-W-F, DM2, PAF, NICM / chronic diastolic HF with recent hospitalization in [facility] with positive Influenza A complicated with right pleural effusion requiring thoracocentesis and discharged home on supplemental O2 3L/min, presenting to ED referred from Pulmonologist after finding of loculated R pleural effusion on CT chest of unclear etiology, associated with dyspnea on exertion and noted peripheral edema, with moderate ascites. Pt receiving HD to offload fluid 2/11 and will have another session today. CT sx/ IR and pulm consulted for possible drainage of the R loculated effusion for further evaluation of etiology, but given overall fluid overload state will c/w offloading fluid with HD and then consider further intervention with paracentesis/ thoracocentesis. TTE completed and noted drop in EF from 55% to 35-40% and mobile density noted on aortic valve leaflets. Cardio consulted for further management.     Dyspnea on exertion likely multifactiorial 2/2 loculated R pleural effusion with concomitant fluid overload noted peripheral edema/ ascites in the setting ESRD/ CHF   - Patient  remains afebrile w/o leucocytosis    - SOB is persistent  but slight improved from yest   - elevated procalcitonin/ esr   -Negative RF/ HIV - F/u  quantiferon  - c/w Vanco and cefepime empirically   - c/w Florastor while on ABX  - Blood culture negative x 2   - F/u sputum culture. (Not taken yet)   - c/w Supplemental O2 as needed  - repeat cxr post HD yest with minimal improvement.   - c/w offloading excess fluid with HD, will had additional HD session today   - ID f/u noted and appreciated   - Pulmonology f/u noted and appreciated    - CT sx and IR consulted d/w Dr. Mahoney and Dr. Null in regards to the current plan of care. As per CT sx much more beneficial to offload ascitic fluid prior to any intervention for the loculated effusion. D/w them both will aggressively offload fluid with consecutive HD sessions, thereafter will repeat imaging. IF ascites persists will perform paracentesis to offload pressure on the diaphragm/ lungs prior to thoracocentesis. Will keep the pt npo at midnight, hold dvt ppx in anticipation of need of paracentesis in the am.     Chronic diastolic HFPEF with new acute drop in EF to 35-40%   - Previous 11/2018: EF 55-55%  -- > 02/2019 35- 40%  - BNP: > 70,000  - c/w Strict I/o   -Fluid restriction   - c/w entresto po bid   - c/w coreg po bid   - c/w offloading fluid with HD  - TTE with drop in ef to 35-40%, pt had prior improved EF on echo 11/2018 ef:55%, also noted mobile density unclear etiology  -possible lambl's excrescences vs vegetation.  - Non obstructive CAD by cath in 3/2018   - cardio consulted     ESRD on HD   - s/p HD: 02/11/2019 to have HD today, consecutive HD sessions to offload more fluid   - will monitor electrolytes closely   - c/w offloading fluid with HD   - c/w calcitriol 0.25mg po qd   - Nephro f/u noted and appreciated and d/w Dr. Larsen the above plan of care  - pt usually has M W F schedule as an outpt     DM2  - Last HbA1C: 6.2 on 01/01/2019  - fs low normal  and had episode of hypoglycemia on 2/10/19   - no HSS coverage   - c/w hypoglycemia protocol     HTN  - bp stable   -c/w Coreg 12.5mg BID PO  - c/w Amlodipine 2.5 QD PO  - c/w Entresto 49/51 PO QD    Anemia in ESRD  - h/h stable  - c/w monitoring h/h     Prophylactic measure:  Holding AC in anticipation of IR guided intervention in the am   Florastor while on Abx  Omeprazole 40mg PO    Advanced Directive: Full code   Next of kin: Garrick Wiggins     Dispo: PT consulted and awaiting further recommendations.

## 2019-02-12 NOTE — PROGRESS NOTE ADULT - SUBJECTIVE AND OBJECTIVE BOX
Patient seen and examined    Feels better, sl SOB supine position  no c/o CP SOB NV   Has some swelling feet    Vital Signs Last 24 Hrs  T(C): 36.4 (12 Feb 2019 15:32), Max: 37.1 (12 Feb 2019 07:12)  T(F): 97.5 (12 Feb 2019 15:32), Max: 98.7 (12 Feb 2019 07:12)  HR: 75 (12 Feb 2019 15:32) (73 - 80)  BP: 161/89 (12 Feb 2019 15:32) (147/86 - 161/89)  BP(mean): --  RR: 18 (12 Feb 2019 15:32) (18 - 18)  SpO2: 99% (12 Feb 2019 15:32) (91% - 99%)    PHYSICAL EXAM    GENERAL: NAD,   EYES:  conjunctiva and sclera clear  NECK: Supple, No JVD/Bruit  NERVOUS SYSTEM:  A/O x3,   CHEST:  No rales, No rhonchi  HEART:  RRR, No murmur  ABDOMEN: Soft, NT/ND BS+  EXTREMITIES:  + Edema;  SKIN: No rashes    12 Feb 2019 05:49    133    |  95     |  23.0   ----------------------------<  169    4.7     |  24.0   |  3.96     Ca    8.4        12 Feb 2019 05:49  Phos  4.8       11 Feb 2019 06:53  Mg     2.3       11 Feb 2019 06:53    TPro  6.7    /  Alb  3.0    /  TBili  0.5    /  DBili  x      /  AST  12     /  ALT  <5     /  AlkPhos  112    12 Feb 2019 05:49                          10.4   3.9   )-----------( 172      ( 12 Feb 2019 05:49 )             34.6         Continue present treatment

## 2019-02-13 LAB
ANION GAP SERPL CALC-SCNC: 13 MMOL/L — SIGNIFICANT CHANGE UP (ref 5–17)
ANISOCYTOSIS BLD QL: SLIGHT — SIGNIFICANT CHANGE UP
APTT BLD: 29.7 SEC — SIGNIFICANT CHANGE UP (ref 27.5–36.3)
BASOPHILS # BLD AUTO: 0 K/UL — SIGNIFICANT CHANGE UP (ref 0–0.2)
BUN SERPL-MCNC: 20 MG/DL — SIGNIFICANT CHANGE UP (ref 8–20)
CALCIUM SERPL-MCNC: 8.3 MG/DL — LOW (ref 8.6–10.2)
CHLORIDE SERPL-SCNC: 96 MMOL/L — LOW (ref 98–107)
CO2 SERPL-SCNC: 27 MMOL/L — SIGNIFICANT CHANGE UP (ref 22–29)
CREAT SERPL-MCNC: 3.86 MG/DL — HIGH (ref 0.5–1.3)
EOSINOPHIL # BLD AUTO: 0.1 K/UL — SIGNIFICANT CHANGE UP (ref 0–0.5)
EOSINOPHIL NFR BLD AUTO: 4 % — SIGNIFICANT CHANGE UP (ref 0–5)
GLUCOSE BLDC GLUCOMTR-MCNC: 168 MG/DL — HIGH (ref 70–99)
GLUCOSE BLDC GLUCOMTR-MCNC: 197 MG/DL — HIGH (ref 70–99)
GLUCOSE SERPL-MCNC: 172 MG/DL — HIGH (ref 70–115)
HCT VFR BLD CALC: 35 % — LOW (ref 37–47)
HGB BLD-MCNC: 10.6 G/DL — LOW (ref 12–16)
HYPOCHROMIA BLD QL: SLIGHT — SIGNIFICANT CHANGE UP
INR BLD: 1.12 RATIO — SIGNIFICANT CHANGE UP (ref 0.88–1.16)
LYMPHOCYTES # BLD AUTO: 0.5 K/UL — LOW (ref 1–4.8)
LYMPHOCYTES # BLD AUTO: 15 % — LOW (ref 20–55)
MAGNESIUM SERPL-MCNC: 2.1 MG/DL — SIGNIFICANT CHANGE UP (ref 1.6–2.6)
MCHC RBC-ENTMCNC: 27.6 PG — SIGNIFICANT CHANGE UP (ref 27–31)
MCHC RBC-ENTMCNC: 30.3 G/DL — LOW (ref 32–36)
MCV RBC AUTO: 91.1 FL — SIGNIFICANT CHANGE UP (ref 81–99)
MONOCYTES # BLD AUTO: 0.3 K/UL — SIGNIFICANT CHANGE UP (ref 0–0.8)
MONOCYTES NFR BLD AUTO: 10 % — SIGNIFICANT CHANGE UP (ref 3–10)
NEUTROPHILS # BLD AUTO: 2.3 K/UL — SIGNIFICANT CHANGE UP (ref 1.8–8)
NEUTROPHILS NFR BLD AUTO: 69 % — SIGNIFICANT CHANGE UP (ref 37–73)
PHOSPHATE SERPL-MCNC: 3.7 MG/DL — SIGNIFICANT CHANGE UP (ref 2.4–4.7)
PLAT MORPH BLD: NORMAL — SIGNIFICANT CHANGE UP
PLATELET # BLD AUTO: 190 K/UL — SIGNIFICANT CHANGE UP (ref 150–400)
POIKILOCYTOSIS BLD QL AUTO: SLIGHT — SIGNIFICANT CHANGE UP
POTASSIUM SERPL-MCNC: 4.3 MMOL/L — SIGNIFICANT CHANGE UP (ref 3.5–5.3)
POTASSIUM SERPL-SCNC: 4.3 MMOL/L — SIGNIFICANT CHANGE UP (ref 3.5–5.3)
PROTHROM AB SERPL-ACNC: 12.9 SEC — SIGNIFICANT CHANGE UP (ref 10–12.9)
RBC # BLD: 3.84 M/UL — LOW (ref 4.4–5.2)
RBC # FLD: 16.1 % — HIGH (ref 11–15.6)
RBC BLD AUTO: ABNORMAL
SODIUM SERPL-SCNC: 136 MMOL/L — SIGNIFICANT CHANGE UP (ref 135–145)
VANCOMYCIN TROUGH SERPL-MCNC: 20.7 UG/ML — HIGH (ref 10–20)
VARIANT LYMPHS # BLD: 2 % — SIGNIFICANT CHANGE UP (ref 0–6)
WBC # BLD: 3.3 K/UL — LOW (ref 4.8–10.8)
WBC # FLD AUTO: 3.3 K/UL — LOW (ref 4.8–10.8)

## 2019-02-13 PROCEDURE — 99232 SBSQ HOSP IP/OBS MODERATE 35: CPT | Mod: GC

## 2019-02-13 PROCEDURE — 99233 SBSQ HOSP IP/OBS HIGH 50: CPT

## 2019-02-13 PROCEDURE — 76705 ECHO EXAM OF ABDOMEN: CPT | Mod: 26

## 2019-02-13 RX ADMIN — LIDOCAINE 1 PATCH: 4 CREAM TOPICAL at 00:00

## 2019-02-13 RX ADMIN — Medication 250 MILLIGRAM(S): at 17:00

## 2019-02-13 RX ADMIN — PANTOPRAZOLE SODIUM 40 MILLIGRAM(S): 20 TABLET, DELAYED RELEASE ORAL at 05:19

## 2019-02-13 RX ADMIN — LIDOCAINE 1 PATCH: 4 CREAM TOPICAL at 11:07

## 2019-02-13 RX ADMIN — CARVEDILOL PHOSPHATE 12.5 MILLIGRAM(S): 80 CAPSULE, EXTENDED RELEASE ORAL at 05:19

## 2019-02-13 RX ADMIN — LIDOCAINE 1 PATCH: 4 CREAM TOPICAL at 23:00

## 2019-02-13 RX ADMIN — Medication 81 MILLIGRAM(S): at 17:00

## 2019-02-13 RX ADMIN — Medication 250 MILLIGRAM(S): at 05:19

## 2019-02-13 RX ADMIN — AMLODIPINE BESYLATE 2.5 MILLIGRAM(S): 2.5 TABLET ORAL at 05:19

## 2019-02-13 RX ADMIN — CALCITRIOL 0.25 MICROGRAM(S): 0.5 CAPSULE ORAL at 11:07

## 2019-02-13 RX ADMIN — Medication 325 MILLIGRAM(S): at 11:08

## 2019-02-13 RX ADMIN — CEFEPIME 100 MILLIGRAM(S): 1 INJECTION, POWDER, FOR SOLUTION INTRAMUSCULAR; INTRAVENOUS at 11:10

## 2019-02-13 RX ADMIN — CARVEDILOL PHOSPHATE 12.5 MILLIGRAM(S): 80 CAPSULE, EXTENDED RELEASE ORAL at 17:00

## 2019-02-13 RX ADMIN — SACUBITRIL AND VALSARTAN 1 TABLET(S): 24; 26 TABLET, FILM COATED ORAL at 05:19

## 2019-02-13 RX ADMIN — LIDOCAINE 1 PATCH: 4 CREAM TOPICAL at 19:00

## 2019-02-13 RX ADMIN — SACUBITRIL AND VALSARTAN 1 TABLET(S): 24; 26 TABLET, FILM COATED ORAL at 17:00

## 2019-02-13 NOTE — PROGRESS NOTE ADULT - SUBJECTIVE AND OBJECTIVE BOX
Patient is a 58y old  Female who presented with a chief complaint of sent from pulmonology office (11 Feb 2019 20:39) due to findings of loculated pleural effusion on imaging.     HOSPITALIZATION DAY:  [facility] [id number]    Antibiotic: Vanco /Cefepime                              Started on:   02/09/2019    INTERVAL/OVERNIGHT EVENTS:  Patient examined at beside.  As per nurse, no acute events overnight.   Patient with improvement of her shortness of breath since yesterday after offloading of fluid through HD.  She was able to walk with assistance during PT evaluation today.  Patient was NPO since midnight, denies nausea/vomiting/diarrhea/abdominal pain. Patient is still voiding small quantity of urine w/o change from baseline. Last BM yesterday.   Patient denies chest pain, calf pain, abdominal pain, headaches, fever, chills, dysuria, hematuria, diarrhea, constipation, SOB, palpitations.   Rest of ROS not contributory except for above.    I&O's Summary    12 Feb 2019 07:01  -  13 Feb 2019 07:00  --------------------------------------------------------  IN: 0 mL / OUT: 2500 mL / NET: -2500 mL    Vital Signs Last 24 Hrs  T(C): 36.3 (13 Feb 2019 16:09), Max: 37.4 (12 Feb 2019 23:08)  T(F): 97.3 (13 Feb 2019 16:09), Max: 99.3 (12 Feb 2019 23:08)  HR: 70 (13 Feb 2019 17:02) (70 - 77)  BP: 158/91 (13 Feb 2019 17:02) (138/74 - 158/91)  RR: 18 (13 Feb 2019 16:09) (18 - 18)  SpO2: 99% (12 Feb 2019 19:05) (99% - 99%)    PHYSICAL EXAM: Vital signs reviewed.  GENERAL: Not in acute distress, tolerating decubitus. Alert and oriented x3 . Cooperative.   HEENT: moist mucous membranes. Clear conjunctiva   NECK: Supple.  CARDIOVASCULAR: Normal S1 and S2, No murmur, RRR.  RESPIRATORY: B/l crackles present predominantly on R side, improved from previous day  GASTROINTESTINAL: Soft, nontender. Mildly distended.   EXTREMITIES: No cyanosis, peripehral edema +1 b/l le   AVF +thrill     SKIN: warm and dry .   NEURO: Alert/oriented x 3. No focal signs on exam.     LABS                        10.6   3.3   )-----------( 190      ( 13 Feb 2019 06:46 )             35.0     02-13    136  |  96<L>  |  20.0  ----------------------------<  172<H>  4.3   |  27.0  |  3.86<H>    Ca    8.3<L>      13 Feb 2019 06:46  Phos  3.7     02-13  Mg     2.1     02-13  TPro  6.7  /  Alb  3.0<L>  /  TBili  0.5  /  DBili  x   /  AST  12  /  ALT  <5  /  AlkPhos  112  02-12    PT/INR - ( 13 Feb 2019 06:46 )   PT: 12.9 sec;   INR: 1.12 ratio    PTT - ( 13 Feb 2019 06:46 )  PTT:29.7 sec    MEDICATIONS  (STANDING):  amLODIPine   Tablet 2.5 milliGRAM(s) Oral daily  aspirin enteric coated 81 milliGRAM(s) Oral daily  calcitriol   Capsule 0.25 MICROGram(s) Oral daily  carvedilol 12.5 milliGRAM(s) Oral every 12 hours  cefepime   IVPB 2000 milliGRAM(s) IV Intermittent daily  dextrose 5%. 1000 milliLiter(s) (50 mL/Hr) IV Continuous <Continuous>  dextrose 50% Injectable 12.5 Gram(s) IV Push once  dextrose 50% Injectable 25 Gram(s) IV Push once  dextrose 50% Injectable 25 Gram(s) IV Push once  ferrous    sulfate 325 milliGRAM(s) Oral daily  lidocaine   Patch 1 Patch Transdermal daily  pantoprazole    Tablet 40 milliGRAM(s) Oral before breakfast  saccharomyces boulardii 250 milliGRAM(s) Oral two times a day  sacubitril 49 mG/valsartan 51 mG 1 Tablet(s) Oral two times a day  vancomycin  IVPB 1000 milliGRAM(s) IV Intermittent <User Schedule>    MEDICATIONS  (PRN):  dextrose 40% Gel 15 Gram(s) Oral once PRN Blood Glucose LESS THAN 70 milliGRAM(s)/deciliter  glucagon  Injectable 1 milliGRAM(s) IntraMuscular once PRN Glucose LESS THAN 70 milligrams/deciliter

## 2019-02-13 NOTE — PROGRESS NOTE ADULT - ASSESSMENT
57 yo female with hx of ESRD on HD M-W-F, DM2, PAF, NICM / chronic diastolic HF with recent hospitalization in [facility] with positive Influenza A complicated with right pleural effusion requiring thoracocentesis and discharged home on supplemental O2 3L/min, presenting to ED referred from Pulmonologist after finding of loculated R pleural effusion on CT chest of unclear etiology, associated with dyspnea on exertion and noted peripheral edema, with moderate ascites. Pt receiving HD to offload fluid 2/11 and will have another session today. CT sx/ IR and pulm consulted for possible drainage of the R loculated effusion for further evaluation of etiology, but given overall fluid overload state will c/w offloading fluid with HD and then consider further intervention with paracentesis/ thoracocentesis. TTE completed and noted drop in EF from 55% to 35-40% and mobile density noted on aortic valve leaflets, upon review of prior TTE, the latter finding was already present and EF might have been overestimated     Dyspnea on exertion likely multifactiorial 2/2 loculated R pleural effusion with concomitant fluid overload noted peripheral edema/ ascites in the setting ESRD/ CHF   - Patient  remains afebrile w/o leucocytosis    - SOB is improved and at her baseline O2 requirement.  - elevated procalcitonin/ esr   -Negative RF/ HIV - F/u  quantiferon  - c/w Vanco and cefepime empirically   - c/w Florastor while on ABX  - Blood culture negative x 2   - F/u sputum culture.  - c/w offloading excess fluid with HD, had additional HD session yesterday  - ID f/u noted and appreciated   - Pulmonology f/u noted and appreciated    - CT sx and IR consulted d/w Dr. Mahoney and Dr. Null in regards to the current plan of care. As per CT sx much more beneficial to offload ascitic fluid prior to any intervention for the loculated effusion. D/w them both will aggressively offload fluid with consecutive HD sessions, US showing mild-moderate ascites, but patient with clinical improvement, will hold of IR procedures as off now as per IR. Will keep the pt npo at midnight, hold dvt ppx in anticipation of need of paracentesis/thoracentesis in the am.     Chronic diastolic HFPEF with new acute drop in EF to 35-40%   - Previous 11/2018: EF 55-55%  -- > 02/2019 35- 40%  - BNP: > 70,000  - c/w Strict I/o   -Fluid restriction   - c/w entresto po bid   - c/w coreg po bid   - c/w offloading fluid with HD  - TTE with drop in ef to 35-40%, pt had prior improved EF on echo 11/2018 ef:55%, also noted mobile density unclear etiology present in prior TTE, blood cultures are negative so far, doesn't meet criteria for endocarditis.  - Non obstructive CAD by cath in 3/2018   - cardiology recommendations appreciated.    ESRD on HD   - s/p x2 consecutive HD sessions for offloading fluid, will HD tomorrow.   - will monitor electrolytes closely   - c/w calcitriol 0.25mg po qd   - Nephro f/u noted and appreciated   - pt usually has [facility] schedule as an outpt     DM2  - Last HbA1C: 6.2 on 01/01/2019  - fs low normal  and had episode of hypoglycemia on 2/10/19   - no HSS coverage   - c/w hypoglycemia protocol     HTN  - bp stable   -c/w Coreg 12.5mg BID PO  - c/w Amlodipine 2.5 QD PO  - c/w Entresto 49/51 PO QD    Anemia in ESRD  - h/h stable  - c/w monitoring h/h     Prophylactic measure:  Holding AC in anticipation of IR guided intervention in the am   Florastor while on Abx  Omeprazole 40mg PO    Advanced Directive: Full code   Next of kin: Garrick Wiggins     Dispo: PT consulted and awaiting further recommendations.

## 2019-02-13 NOTE — PROGRESS NOTE ADULT - ASSESSMENT
ESRD: + fluid overload  - HD arranged for tomorrow with UF as tolerates    Anemia: MARGI at HD as needed to keep Hgb>10.0  - cont oral Fe  - trend H/H    RO: phos ok  - cont Calcitriol

## 2019-02-13 NOTE — PHYSICAL THERAPY INITIAL EVALUATION ADULT - ADDITIONAL COMMENTS
Pt states she lives in a house with her son and his spouse who assists her. There are 5-6 ALE with HR and a flight to the bedroom. She is on oxygen at home and uses a RW.

## 2019-02-13 NOTE — PROGRESS NOTE ADULT - SUBJECTIVE AND OBJECTIVE BOX
NEPHROLOGY INTERVAL HPI/OVERNIGHT EVENTS:  pt comfortable this AM  tolerated HD yesterday  no acute sob, cp, n/v/d    MEDICATIONS  (STANDING):  amLODIPine   Tablet 2.5 milliGRAM(s) Oral daily  aspirin enteric coated 81 milliGRAM(s) Oral daily  calcitriol   Capsule 0.25 MICROGram(s) Oral daily  carvedilol 12.5 milliGRAM(s) Oral every 12 hours  cefepime   IVPB 2000 milliGRAM(s) IV Intermittent daily  dextrose 5%. 1000 milliLiter(s) (50 mL/Hr) IV Continuous <Continuous>  dextrose 50% Injectable 12.5 Gram(s) IV Push once  dextrose 50% Injectable 25 Gram(s) IV Push once  dextrose 50% Injectable 25 Gram(s) IV Push once  ferrous    sulfate 325 milliGRAM(s) Oral daily  lidocaine   Patch 1 Patch Transdermal daily  pantoprazole    Tablet 40 milliGRAM(s) Oral before breakfast  saccharomyces boulardii 250 milliGRAM(s) Oral two times a day  sacubitril 49 mG/valsartan 51 mG 1 Tablet(s) Oral two times a day  vancomycin  IVPB 1000 milliGRAM(s) IV Intermittent <User Schedule>    MEDICATIONS  (PRN):  dextrose 40% Gel 15 Gram(s) Oral once PRN Blood Glucose LESS THAN 70 milliGRAM(s)/deciliter  glucagon  Injectable 1 milliGRAM(s) IntraMuscular once PRN Glucose LESS THAN 70 milligrams/deciliter      Allergies    No Known Allergies          Vital Signs Last 24 Hrs  T(C): 37.4 (12 Feb 2019 23:08), Max: 37.4 (12 Feb 2019 23:08)  T(F): 99.3 (12 Feb 2019 23:08), Max: 99.3 (12 Feb 2019 23:08)  HR: 74 (13 Feb 2019 05:00) (71 - 77)  BP: 144/85 (13 Feb 2019 05:00) (144/85 - 196/82)  BP(mean): --  RR: 18 (12 Feb 2019 23:08) (18 - 18)  SpO2: 99% (12 Feb 2019 19:05) (96% - 99%)    PHYSICAL EXAM:  GENERAL: Appears chronically ill  NECK: Supple, No jvd  NERVOUS SYSTEM:  A/O x3, non focal  CHEST:  Clear with diminished basilar breath sounds (L > R)  HEART:  RRR, No rub  ABDOMEN: Soft, NT/ND BS+  EXTREMITIES:  + LE edema  SKIN: No rashes    LABS:                        10.6   3.3   )-----------( 190      ( 13 Feb 2019 06:46 )             35.0     02-13    136  |  96<L>  |  20.0  ----------------------------<  172<H>  4.3   |  27.0  |  3.86<H>    Ca    8.3<L>      13 Feb 2019 06:46  Phos  3.7     02-13  Mg     2.1     02-13    TPro  6.7  /  Alb  3.0<L>  /  TBili  0.5  /  DBili  x   /  AST  12  /  ALT  <5  /  AlkPhos  112  02-12    PT/INR - ( 13 Feb 2019 06:46 )   PT: 12.9 sec;   INR: 1.12 ratio         PTT - ( 13 Feb 2019 06:46 )  PTT:29.7 sec    Magnesium, Serum: 2.1 mg/dL (02-13 @ 06:46)  Phosphorus Level, Serum: 3.7 mg/dL (02-13 @ 06:46)      RADIOLOGY & ADDITIONAL TESTS:  < from: Xray Chest 2 Views PA/Lat (02.11.19 @ 14:48) >   EXAM:  XR CHEST PA LAT 2V                          PROCEDURE DATE:  02/11/2019          INTERPRETATION:  PA and lateral chest radiographs     COMPARISON: 2/9/2019.    CLINICAL INFORMATION: Chest Pain.    FINDINGS:  Persistent perihilar and RIGHT lung base interstitial opacities noted.  Multifocal airspace consolidation in the RIGHT lung base with a RIGHT   pleural effusion layering to the apex of RIGHT lung.  I there is LEFT lower lobe airspace consolidation with air bronchograms   and a probable pleural effusion.    LEFT apical lung clear.    There is gross cardiomegaly unchanged from prior examination.  The visualized osseus structures are intact.     IMPRESSION:    Gross cardiomegaly. Perihilar and interstitial and bibasilar LEFT greater   than RIGHT airspace consolidations and effusions as described.          < end of copied text >

## 2019-02-14 LAB
ANION GAP SERPL CALC-SCNC: 14 MMOL/L — SIGNIFICANT CHANGE UP (ref 5–17)
APTT BLD: 30.8 SEC — SIGNIFICANT CHANGE UP (ref 27.5–36.3)
BASOPHILS # BLD AUTO: 0 K/UL — SIGNIFICANT CHANGE UP (ref 0–0.2)
BASOPHILS NFR BLD AUTO: 0.3 % — SIGNIFICANT CHANGE UP (ref 0–2)
BUN SERPL-MCNC: 25 MG/DL — HIGH (ref 8–20)
CALCIUM SERPL-MCNC: 8.3 MG/DL — LOW (ref 8.6–10.2)
CHLORIDE SERPL-SCNC: 93 MMOL/L — LOW (ref 98–107)
CO2 SERPL-SCNC: 27 MMOL/L — SIGNIFICANT CHANGE UP (ref 22–29)
CREAT SERPL-MCNC: 4.53 MG/DL — HIGH (ref 0.5–1.3)
CULTURE RESULTS: SIGNIFICANT CHANGE UP
CULTURE RESULTS: SIGNIFICANT CHANGE UP
EOSINOPHIL # BLD AUTO: 0.1 K/UL — SIGNIFICANT CHANGE UP (ref 0–0.5)
EOSINOPHIL NFR BLD AUTO: 4.1 % — SIGNIFICANT CHANGE UP (ref 0–6)
GLUCOSE BLDC GLUCOMTR-MCNC: 141 MG/DL — HIGH (ref 70–99)
GLUCOSE BLDC GLUCOMTR-MCNC: 161 MG/DL — HIGH (ref 70–99)
GLUCOSE BLDC GLUCOMTR-MCNC: 95 MG/DL — SIGNIFICANT CHANGE UP (ref 70–99)
GLUCOSE SERPL-MCNC: 148 MG/DL — HIGH (ref 70–115)
HCT VFR BLD CALC: 35.3 % — LOW (ref 37–47)
HGB BLD-MCNC: 10.7 G/DL — LOW (ref 12–16)
INR BLD: 1.17 RATIO — HIGH (ref 0.88–1.16)
LYMPHOCYTES # BLD AUTO: 0.5 K/UL — LOW (ref 1–4.8)
LYMPHOCYTES # BLD AUTO: 17.1 % — LOW (ref 20–55)
MAGNESIUM SERPL-MCNC: 2.2 MG/DL — SIGNIFICANT CHANGE UP (ref 1.8–2.6)
MCHC RBC-ENTMCNC: 27.4 PG — SIGNIFICANT CHANGE UP (ref 27–31)
MCHC RBC-ENTMCNC: 30.3 G/DL — LOW (ref 32–36)
MCV RBC AUTO: 90.3 FL — SIGNIFICANT CHANGE UP (ref 81–99)
MONOCYTES # BLD AUTO: 0.4 K/UL — SIGNIFICANT CHANGE UP (ref 0–0.8)
MONOCYTES NFR BLD AUTO: 12.4 % — HIGH (ref 3–10)
NEUTROPHILS # BLD AUTO: 2.1 K/UL — SIGNIFICANT CHANGE UP (ref 1.8–8)
NEUTROPHILS NFR BLD AUTO: 66.1 % — SIGNIFICANT CHANGE UP (ref 37–73)
PHOSPHATE SERPL-MCNC: 4.9 MG/DL — HIGH (ref 2.4–4.7)
PLATELET # BLD AUTO: 206 K/UL — SIGNIFICANT CHANGE UP (ref 150–400)
POTASSIUM SERPL-MCNC: 4.3 MMOL/L — SIGNIFICANT CHANGE UP (ref 3.5–5.3)
POTASSIUM SERPL-SCNC: 4.3 MMOL/L — SIGNIFICANT CHANGE UP (ref 3.5–5.3)
PROTHROM AB SERPL-ACNC: 13.5 SEC — HIGH (ref 10–12.9)
RBC # BLD: 3.91 M/UL — LOW (ref 4.4–5.2)
RBC # FLD: 16 % — HIGH (ref 11–15.6)
SODIUM SERPL-SCNC: 134 MMOL/L — LOW (ref 135–145)
SPECIMEN SOURCE: SIGNIFICANT CHANGE UP
SPECIMEN SOURCE: SIGNIFICANT CHANGE UP
WBC # BLD: 3.2 K/UL — LOW (ref 4.8–10.8)
WBC # FLD AUTO: 3.2 K/UL — LOW (ref 4.8–10.8)

## 2019-02-14 PROCEDURE — 99232 SBSQ HOSP IP/OBS MODERATE 35: CPT

## 2019-02-14 PROCEDURE — 99232 SBSQ HOSP IP/OBS MODERATE 35: CPT | Mod: GC

## 2019-02-14 RX ORDER — INFLUENZA VIRUS VACCINE 15; 15; 15; 15 UG/.5ML; UG/.5ML; UG/.5ML; UG/.5ML
0.5 SUSPENSION INTRAMUSCULAR ONCE
Qty: 0 | Refills: 0 | Status: COMPLETED | OUTPATIENT
Start: 2019-02-14 | End: 2019-02-14

## 2019-02-14 RX ADMIN — CEFEPIME 100 MILLIGRAM(S): 1 INJECTION, POWDER, FOR SOLUTION INTRAMUSCULAR; INTRAVENOUS at 12:08

## 2019-02-14 RX ADMIN — SACUBITRIL AND VALSARTAN 1 TABLET(S): 24; 26 TABLET, FILM COATED ORAL at 17:17

## 2019-02-14 RX ADMIN — LIDOCAINE 1 PATCH: 4 CREAM TOPICAL at 12:08

## 2019-02-14 RX ADMIN — SACUBITRIL AND VALSARTAN 1 TABLET(S): 24; 26 TABLET, FILM COATED ORAL at 05:13

## 2019-02-14 RX ADMIN — Medication 250 MILLIGRAM(S): at 17:17

## 2019-02-14 RX ADMIN — Medication 250 MILLIGRAM(S): at 12:08

## 2019-02-14 RX ADMIN — CARVEDILOL PHOSPHATE 12.5 MILLIGRAM(S): 80 CAPSULE, EXTENDED RELEASE ORAL at 17:17

## 2019-02-14 RX ADMIN — Medication 250 MILLIGRAM(S): at 05:13

## 2019-02-14 RX ADMIN — CARVEDILOL PHOSPHATE 12.5 MILLIGRAM(S): 80 CAPSULE, EXTENDED RELEASE ORAL at 05:13

## 2019-02-14 RX ADMIN — LIDOCAINE 1 PATCH: 4 CREAM TOPICAL at 19:00

## 2019-02-14 RX ADMIN — PANTOPRAZOLE SODIUM 40 MILLIGRAM(S): 20 TABLET, DELAYED RELEASE ORAL at 05:14

## 2019-02-14 RX ADMIN — AMLODIPINE BESYLATE 2.5 MILLIGRAM(S): 2.5 TABLET ORAL at 05:13

## 2019-02-14 NOTE — PROGRESS NOTE ADULT - PROBLEM SELECTOR PLAN 1
-Pt admitted > care per primary team   -Currently followed by ID with abx for PNA   -Renal following will continue HD per recommendations  -s/p abd ultrasound > Mild mod ascites  -per Dr. Null recommendations - ascites and loc pleural effusion.  Would recommend paracentesis and possible IR Rt thoracentesis  -Per primary note pt NPO for possible IR intervention today  Plan discussed with Dr. Null

## 2019-02-14 NOTE — PROGRESS NOTE ADULT - ASSESSMENT
59 y/o woman with PMH of ESRD on HD, DM2, Paroxysmal AFib, CHF with recent hospitalization in [facility] with + flu and right pleural effusion s/p thoracocentesis and sent home with supplemental O2 3L brought in from home, during her outpt follow up had a CXR that showed loculated effusion so Dr. Nolasco send her for admission and work up.   Chest CT in [facility] showed multiple loculations in right lung.     Pneumonia  Loculated pleural effusion  ESRD     - Blood culture negative x 2   - Procalcitonin 0.20  - Leukopenia in baseline   - Afebrile  - HIV and RVP negative   - Seen by CT surgery and pulmonary, for possible IR paracentesis and thoracentesis.   - Please send the fluid for analysis and culture   - Continue cefepime 2gm daily   - Continue vancomycin 1gm post dialysis  - Trough predialysis between 15 and 20  - Will switch ABx regimen based on culture results.     Will follow.

## 2019-02-14 NOTE — PROGRESS NOTE ADULT - SUBJECTIVE AND OBJECTIVE BOX
Subjective: 59y/o Azeri speaking female presents sitting up in chair in NAD. At this time she "feels better". Denies SOB, CP, N/V/D  Patient is currently on 2L NC saturating 98%    Vital Signs:  Vital Signs Last 24 Hrs  T(C): 37 (02-14-19 @ 07:43), Max: 37 (02-14-19 @ 07:43)  T(F): 98.6 (02-14-19 @ 07:43), Max: 98.6 (02-14-19 @ 07:43)  HR: 73 (02-14-19 @ 07:43) (70 - 74)  BP: 156/83 (02-14-19 @ 07:43) (138/74 - 159/83)  RR: 18 (02-14-19 @ 07:43) (17 - 18)  SpO2: -- on (O2)      Relevant labs, Radiology and Medications reviewed    CXR:   < from: Xray Chest 2 Views PA/Lat (02.11.19 @ 14:48) >  MPRESSION:    Gross cardiomegaly. Perihilar and interstitial and bibasilar LEFT greater   than RIGHT airspace consolidations and effusions as described.      < end of copied text >    Abd Ultrasound:  < from: US Abdomen Limited (02.13.19 @ 15:52) >  IMPRESSION:     Mild to moderate ascites.    Renal parenchymal disease involving the visualized right kidney.    Additional findings as above.      < end of copied text >    Pertinent Physical Exam  Neuro: A+O x 3, non-focal, speech clear and intact  CV: regular rate, regular rhythm  Pulm/chest: lung sounds diminished to bilateral bases. No accessory muscle use noted  Ext: Moves all extremities x 4, without clubbing/cyanosis/edema  Skin: warm, well perfused, no rashes

## 2019-02-14 NOTE — PROGRESS NOTE ADULT - ASSESSMENT
ESRD: + fluid overload  - HD today with UF as tolerates    Anemia: MARGI at HD   - cont oral Fe  - trend H/H==> target Hgb>10.0    RO: phos ok  - cont Calcitriol

## 2019-02-14 NOTE — PROGRESS NOTE ADULT - ASSESSMENT
58 F with history of chronic systolic heart failure, ESRD on HD, pleural effusion s/p pigtail catheter insertion 12/18 now presenting from outpatient pulmonologist after CXR showed worsening infiltrate v effusion on right. ED CT scan c/w small to moderate loculated right sided effusion.

## 2019-02-14 NOTE — PROGRESS NOTE ADULT - ASSESSMENT
57 yo female with hx of ESRD on HD M-W-F, DM2, PAF, NICM / chronic diastolic HF with recent hospitalization in [facility] with positive Influenza A complicated with right pleural effusion requiring thoracocentesis and discharged home on supplemental O2 3L/min, presenting to ED referred from Pulmonologist after finding of loculated R pleural effusion on CT chest of unclear etiology, associated with dyspnea on exertion and noted peripheral edema, with moderate ascites. Pt receiving HD to offload fluid 2/11 and will have another session today. CT sx/ IR and pulm consulted for possible drainage of the R loculated effusion for further evaluation of etiology, but given overall fluid overload state will c/w offloading fluid with HD and then consider further intervention with paracentesis/ thoracocentesis. TTE completed and noted drop in EF from 55% to 35-40% and mobile density noted on aortic valve leaflets, upon review of prior TTE, the latter finding was already present and EF might have been overestimated     Dyspnea on exertion likely multifactiorial 2/2 loculated R pleural effusion with concomitant fluid overload noted peripheral edema/ ascites in the setting ESRD/ CHF   - Patient  remains afebrile w/o leucocytosis    - SOB is improved and at her baseline O2 requirement.  - elevated procalcitonin/ esr   -Negative RF/ HIV - F/u  quantiferon  - c/w Vanco and cefepime empirically   - c/w Florastor while on ABX  - Blood culture negative x 2   - F/u sputum culture.  - c/w offloading excess fluid with HD, had additional HD session yesterday  - ID f/u noted and appreciated   - Pulmonology f/u noted and appreciated    - CT sx and IR consulted d/w Dr. Mahoney and Dr. Null in regards to the current plan of care. As per CT sx much more beneficial to offload ascitic fluid prior to any intervention for the loculated effusion. D/w them both will aggressively offload fluid with consecutive HD sessions, US showing mild-moderate ascites, but patient with clinical improvement, will hold of IR procedures as off now as per IR. Will keep the pt npo at midnight, hold dvt ppx in anticipation of need of paracentesis/thoracentesis today.     Chronic diastolic HFPEF with new acute drop in EF to 35-40%   - Previous 11/2018: EF 55-55%  -- > 02/2019 35- 40%  - BNP: > 70,000  - c/w Strict I/o   -Fluid restriction   - c/w entresto po bid   - c/w coreg po bid   - c/w offloading fluid with HD  - TTE with drop in ef to 35-40%, pt had prior improved EF on echo 11/2018 ef:55%, also noted mobile density unclear etiology present in prior TTE, blood cultures are negative so far, doesn't meet criteria for endocarditis.  - Non obstructive CAD by cath in 3/2018   - cardiology recommendations appreciated.    ESRD on HD   - s/p x2 consecutive HD sessions for offloading fluid, will HD today  - will monitor electrolytes closely   - c/w calcitriol 0.25mg po qd   - Nephro f/u noted and appreciated   - pt usually has [facility] schedule as an outpt     DM2  - Last HbA1C: 6.2 on 01/01/2019  - fs low normal  and had episode of hypoglycemia on 2/10/19   - no HSS coverage   - c/w hypoglycemia protocol     HTN  - bp stable   -c/w Coreg 12.5mg BID PO  - c/w Amlodipine 2.5 QD PO  - c/w Entresto 49/51 PO QD    Anemia in ESRD  - h/h stable  - c/w monitoring h/h     Prophylactic measure:  Holding AC in anticipation of IR guided intervention in the am   Florastor while on Abx  Omeprazole 40mg PO    Advanced Directive: Full code   Next of kin: Garrick Wiggins     Dispo: PT consulted and awaiting further recommendations. 59 yo female with hx of ESRD on HD MWF, chronic HFrEF (EF 35-40% TTE 02/11/19), NICM (cardiac cath 03/18 non-obstructive CAD), paroxysmal Afib (no AC due to GI bleed), uremic pericarditis s/p pericardiocentesis, right sided pleural effusion s/p pigtail catheter, IDDm, and HTN with recent hospitalization in [facility] 2/2 positive Influenza A complicated with right pleural effusion requiring thoracocentesis and discharged home on supplemental O2 3L/min, currently presenting to ED referred from Pulmonologist after finding of loculated R pleural effusion on CT chest of unclear etiology, associated with dyspnea on exertion and noted peripheral edema, with moderate ascites. Pt received consecutive HD sessions x 2 to offload fluid 2/11 and 2/12 with improvement in sob. CT sx/ IR and pulm consulted. As per CT sx need drainage of ascitic fluid prior to draining R loculated effusion for further evaluation of etiology, but given overall fluid overload state will c/w offloading fluid with HD and then consider further intervention with paracentesis/ thoracocentesis. TTE completed and noted drop in EF from 55% to 35-40% and mobile density noted on aortic valve leaflets, upon review of prior TTE, the latter finding was already present and EF might have been overestimated as per cardiology. 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 Pt has been npo every day x 3 days for possible paracentesis but given IR scheduling has been postponed, will be npo at midnight and planned for paracentesis in the am as well as repeat cxr in the am.     Dyspnea on exertion likely multifactiorial 2/2 loculated R pleural effusion with concomitant fluid overload noted peripheral edema/ ascites in the setting ESRD/ CHF   - Patient  remains afebrile w/o leucocytosis    - SOB is improved and at her baseline O2 requirement.  - elevated procalcitonin/ esr   -Negative RF/ HIV - F/u  quantiferon  - c/w Vanco and cefepime empirically   - c/w Florastor while on ABX  - Blood culture negative x 2   - F/u sputum culture.  - c/w offloading excess fluid with HD, had additional HD session yesterday  - ID f/u noted and appreciated   - Pulmonology f/u noted and appreciated    - CT sx and IR consulted d/w Dr. Mahoney and Dr. Null in regards to the current plan of care. As per CT sx much more beneficial to offload ascitic fluid prior to any intervention for the loculated effusion. D/w them both will aggressively offload fluid with consecutive HD sessions, US showing mild-moderate ascites, but patient with clinical improvement, will hold of IR procedures as off now as per IR. Will keep the pt npo at midnight, hold dvt ppx in anticipation of need of paracentesis/thoracentesis today.     Chronic diastolic HFPEF with new acute drop in EF to 35-40%   - Previous 11/2018: EF 55-55%  -- > 02/2019 35- 40%  - BNP: > 70,000  - c/w Strict I/o   -Fluid restriction   - c/w entresto po bid   - c/w coreg po bid   - c/w offloading fluid with HD  - TTE with drop in ef to 35-40%, pt had prior improved EF on echo 11/2018 ef:55%, also noted mobile density unclear etiology present in prior TTE, blood cultures are negative so far, doesn't meet criteria for endocarditis.  - Non obstructive CAD by cath in 3/2018   - cardiology recommendations appreciated.    ESRD on HD   - s/p x2 consecutive HD sessions for offloading fluid, will HD today  - will monitor electrolytes closely   - c/w calcitriol 0.25mg po qd   - Nephro f/u noted and appreciated   - pt usually has [facility] schedule as an outpt     DM2  - Last HbA1C: 6.2 on 01/01/2019  - fs low normal  and had episode of hypoglycemia on 2/10/19   - no HSS coverage   - c/w hypoglycemia protocol     HTN  - bp stable   -c/w Coreg 12.5mg BID PO  - c/w Amlodipine 2.5 QD PO  - c/w Entresto 49/51 PO QD    Anemia in ESRD  - h/h stable  - c/w monitoring h/h     Prophylactic measure:  Holding AC in anticipation of IR guided intervention in the am   Florastor while on Abx  Omeprazole 40mg PO    Advanced Directive: Full code   Next of kin: Garrick Wiggins     Dispo: PT consulted and awaiting further recommendations. 57 yo female with hx of ESRD on HD MWF, chronic HFrEF (EF 35-40% TTE 02/11/19), NICM (cardiac cath 03/18 non-obstructive CAD), paroxysmal Afib (no AC due to GI bleed), uremic pericarditis s/p pericardiocentesis, right sided pleural effusion s/p pigtail catheter, IDDm, and HTN with recent hospitalization in [facility] 2/2 positive Influenza A complicated with right pleural effusion requiring thoracocentesis and discharged home on supplemental O2 3L/min, currently presenting to ED referred from Pulmonologist after finding of loculated R pleural effusion on CT chest of unclear etiology, associated with dyspnea on exertion and noted peripheral edema, with moderate ascites. Pt received consecutive HD sessions x 2 to offload fluid 2/11 and 2/12 with improvement in sob. CT sx/ IR and pulm consulted. As per CT sx need drainage of ascitic fluid prior to draining R loculated effusion for further evaluation of etiology, but given overall fluid overload state will c/w offloading fluid with HD and then consider further intervention with paracentesis/ thoracocentesis. TTE completed and noted drop in EF from 55% to 35-40% and mobile density noted on aortic valve leaflets, upon review of prior TTE, the latter finding was already present and EF might have been overestimated as per cardiology. 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 Pt has been npo every day x 3 days for possible paracentesis but given IR scheduling has been postponed, will be npo at midnight and planned for paracentesis in the am as well as repeat cxr in the am.     Dyspnea on exertion likely multifactiorial 2/2 loculated R pleural effusion with concomitant fluid overload noted peripheral edema/ ascites in the setting ESRD/ CHF. clinical concern for infectious process given recent influenza infection   - Patient  remains afebrile w/o leucocytosis    - SOB is improved and at her baseline O2 requirement of 3l via NC   - elevated procalcitonin/ esr   -Negative RF/ HIV - quantiferon negative   - c/w Vanco and cefepime empirically    -vanco trough per pharmacy   - c/w Florastor while on ABX  - Blood culture negative x 2 repeat set is testing   - c/w offloading excess fluid with HD  - ID f/u noted and appreciated, awaiting fluid removal for fluid analysis   - Pulmonology f/u noted and appreciated    - CT sx and IR consulted d/w Dr. Saenz and Dr. Null in regards to the current plan of care. As per CT sx much more beneficial to offload ascitic fluid prior to any intervention for the loculated effusion. D/w them both will aggressively offload fluid with consecutive HD sessions, US showing mild-moderate ascites. As per Dr. Null the ascites fluid must be drained prior to thoracocentesis being done. Requested Dr. Saenz to possibly due to paracentesis and thoracocentesis in the am but reports IR can only do paracentesis in the am. NPO at midnight. Paracentesis had been postponed due to IR scheduling/ contemplation if paracentesis is beneficial as per IR. If and when thoracocentesis is needed IR to drain for dx purposes. Repeat cxr ordered for the am.     Chronic systolic HFrEF: 35-40%   - Improved peripheral edema, ascites and crackles in lungs with slight improvement.   - Previous 11/2018: EF 55-55%  -- > 02/2019 35- 40%  - BNP: > 70,000  - c/w Strict I/o   -Fluid restriction    - daily weight   - c/w entresto po bid   - c/w coreg po bid   - c/w offloading fluid with HD  - TTE with drop in ef to 35-40%, pt had prior improved EF on echo 11/2018 ef:55%,as per cardio  TTE in 11/2018 with likely overestimated ejection fraction. 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    ESRD on HD   - s/p x2 consecutive HD sessions for offloading fluid, to c/w HD tu th sat   - will monitor electrolytes closely   - c/w calcitriol 0.25mg po qd   - Nephro f/u noted and appreciated   - pt usually has [facility] schedule as an outpt     DM2  - Last HbA1C: 6.2 on 01/01/2019  - fs low normal  and had episode of hypoglycemia on 2/10/19   - no HSS coverage   - c/w hypoglycemia protocol   - pt should c/w lifestyle/ diet modifications     HTN  - bp stable   -c/w Coreg 12.5mg BID PO  - c/w Amlodipine 2.5 QD PO  - c/w Entresto 49/51 PO QD    Anemia in ESRD  - h/h stable  - c/w monitoring h/h     Prophylactic measure:  Holding AC in anticipation of IR guided intervention in the am   Florastor while on Abx  Omeprazole 40mg PO    Advanced Directive: Full code   Next of kin: Son Feliciano     Dispo: Pt at baseline walks independently and has chronic O2 at home. Lives with her son. PT consulted and recommended home w/ assist but since evaluation pt has been independently ambulating to the bathroom and around her room.

## 2019-02-14 NOTE — PROGRESS NOTE ADULT - SUBJECTIVE AND OBJECTIVE BOX
58y Female,   Patient is a 58y old  Female who presents with a chief complaint of sent from pulmonology office (13 Feb 2019 16:55)    [facility] 2GUL [id number]    Antibiotic:     Vancomycin, Cefepime.    INTERVAL/OVERNIGHT EVENTS:    Patient examined at beside. No acute event overnight.   Patient states her SOB persist but is very much improved. States she has been able to get OOB to chair twice yesterday and was able to walk with assistance during PT eval.   Patient was NPO since midnight, denies nausea/vomiting/diarrhea/abdominal pain. Last BM yesterday. Voiding small quantity of urine, w/o changes.   Patient denies chest pain, calf pain, abdominal pain, headaches, fever, chills, dysuria, hematuria, diarrhea, constipation, SOB, palpitations.   Rest of ROS not contributory except for above.      CARDIAC MONITOR  OXYGEN: Nasal Cannula   3 L/min       POCT Blood Glucose.: 197 mg/dL (13 Feb 2019 21:32)  POCT Blood Glucose.: 168 mg/dL (13 Feb 2019 11:06)      T(C): 36.8 (02-13-19 @ 23:04), Max: 36.8 (02-13-19 @ 23:04)  HR: 71 (02-14-19 @ 05:12) (70 - 76)  BP: 159/83 (02-14-19 @ 05:12) (138/74 - 159/83)  RR: 17 (02-13-19 @ 23:04) (17 - 18)    Vital signs reviewed.  GENERAL: Examined at bedside, not in acute distress, seating on bed, alert and oriented x3. Cooperative with exam.   HEENT: moist mucous membranes. Clear conjunctiva   NECK: Supple.  CARDIOVASCULAR: Normal S1 and S2, No murmur, RRR.  RESPIRATORY: B/l crackles present predominantly on R side, w/o change from previous day.   GASTROINTESTINAL: Soft, nontender. Mildly distended.   EXTREMITIES: No cyanosis, no edema. Left arm AVF thrill+, Bruit +  SKIN: warm and dry .   NEURO: Alert/oriented x 3. No focal signs on exam.     LABS                          10.7   3.2   )-----------( 206      ( 14 Feb 2019 06:04 )             35.3         02-14    134<L>  |  93<L>  |  25.0<H>  ----------------------------<  148<H>  4.3   |  27.0  |  4.53<H>    Ca    8.3<L>      14 Feb 2019 06:04  Phos  4.9     02-14  Mg     2.2     02-14      PT/INR - ( 14 Feb 2019 06:04 )   PT: 13.5 sec;   INR: 1.17 ratio         PTT - ( 14 Feb 2019 06:04 )  PTT:30.8 sec  IMAGING    < from: US Abdomen Limited (02.13.19 @ 15:52) >  FINDINGS:    Sonographic evaluation of 4 quadrants of the abdomen demonstrates mild to   moderate ascites.    Nonspecific gallbladder wall thickening is noted measuring 8 mm. There is   possibility of a 4 mm sized adherent calculus within the gallbladder.    Visualized right kidney demonstrates increased cortical echogenicity   indicating renal parenchymal disease.    There is evidence of bilateral pleural effusions.      IMPRESSION:     Mild to moderate ascites.    Renal parenchymal disease involving the visualized right kidney.    Additional findings as above.    < end of copied text >      DIET: NPO     MEDICATIONS  (STANDING):  amLODIPine   Tablet 2.5 milliGRAM(s) Oral daily  aspirin enteric coated 81 milliGRAM(s) Oral daily  calcitriol   Capsule 0.25 MICROGram(s) Oral daily  carvedilol 12.5 milliGRAM(s) Oral every 12 hours  cefepime   IVPB 2000 milliGRAM(s) IV Intermittent daily  dextrose 5%. 1000 milliLiter(s) (50 mL/Hr) IV Continuous <Continuous>  dextrose 50% Injectable 12.5 Gram(s) IV Push once  dextrose 50% Injectable 25 Gram(s) IV Push once  dextrose 50% Injectable 25 Gram(s) IV Push once  ferrous    sulfate 325 milliGRAM(s) Oral daily  lidocaine   Patch 1 Patch Transdermal daily  pantoprazole    Tablet 40 milliGRAM(s) Oral before breakfast  saccharomyces boulardii 250 milliGRAM(s) Oral two times a day  sacubitril 49 mG/valsartan 51 mG 1 Tablet(s) Oral two times a day  vancomycin  IVPB 1000 milliGRAM(s) IV Intermittent <User Schedule>    MEDICATIONS  (PRN):  dextrose 40% Gel 15 Gram(s) Oral once PRN Blood Glucose LESS THAN 70 milliGRAM(s)/deciliter  glucagon  Injectable 1 milliGRAM(s) IntraMuscular once PRN Glucose LESS THAN 70 milligrams/deciliter 58y Female,   Patient is a 58y old  Female who presents with a chief complaint of sent from pulmonology office (13 Feb 2019 16:55)    [facility] 2GUL 2326 [id number]    Antibiotic:     Vancomycin, Cefepime.    INTERVAL/OVERNIGHT EVENTS:  Rita Interpretor - Kari     Patient examined at beside. No acute event overnight.   Patient states her SOB persist but is very much improved. States she has been able to get OOB to chair twice yesterday and was able to walk with assistance during PT eval.   Patient was NPO since midnight, denies nausea/vomiting/diarrhea/abdominal pain. Last BM yesterday. Voiding small quantity of urine, w/o changes.   Patient denies chest pain, calf pain, abdominal pain, headaches, fever, chills, dysuria, hematuria, diarrhea, constipation, SOB, palpitations.   Rest of ROS not contributory except for above.      CARDIAC MONITOR  OXYGEN: Nasal Cannula   3 L/min       POCT Blood Glucose.: 197 mg/dL (13 Feb 2019 21:32)  POCT Blood Glucose.: 168 mg/dL (13 Feb 2019 11:06)      T(C): 36.8 (02-13-19 @ 23:04), Max: 36.8 (02-13-19 @ 23:04)  HR: 71 (02-14-19 @ 05:12) (70 - 76)  BP: 159/83 (02-14-19 @ 05:12) (138/74 - 159/83)  RR: 17 (02-13-19 @ 23:04) (17 - 18)    Vital signs reviewed.  GENERAL: Examined at bedside, not in acute distress, seating on bed, alert and oriented x3. Cooperative with exam.   HEENT: moist mucous membranes. Clear conjunctiva   NECK: Supple.  CARDIOVASCULAR: Normal S1 and S2, No murmur, RRR.  RESPIRATORY: B/l crackles present predominantly on R side, w/o change from previous day.   GASTROINTESTINAL: Soft, nontender. Mildly distended.   EXTREMITIES: No cyanosis, no edema. Left arm AVF thrill+, Bruit +  SKIN: warm and dry .   NEURO: Alert/oriented x 3. No focal signs on exam.     LABS                          10.7   3.2   )-----------( 206      ( 14 Feb 2019 06:04 )             35.3         02-14    134<L>  |  93<L>  |  25.0<H>  ----------------------------<  148<H>  4.3   |  27.0  |  4.53<H>    Ca    8.3<L>      14 Feb 2019 06:04  Phos  4.9     02-14  Mg     2.2     02-14      PT/INR - ( 14 Feb 2019 06:04 )   PT: 13.5 sec;   INR: 1.17 ratio         PTT - ( 14 Feb 2019 06:04 )  PTT:30.8 sec  IMAGING    < from: US Abdomen Limited (02.13.19 @ 15:52) >  FINDINGS:    Sonographic evaluation of 4 quadrants of the abdomen demonstrates mild to   moderate ascites.    Nonspecific gallbladder wall thickening is noted measuring 8 mm. There is   possibility of a 4 mm sized adherent calculus within the gallbladder.    Visualized right kidney demonstrates increased cortical echogenicity   indicating renal parenchymal disease.    There is evidence of bilateral pleural effusions.      IMPRESSION:     Mild to moderate ascites.    Renal parenchymal disease involving the visualized right kidney.    Additional findings as above.    < end of copied text >      DIET: NPO     MEDICATIONS  (STANDING):  amLODIPine   Tablet 2.5 milliGRAM(s) Oral daily  aspirin enteric coated 81 milliGRAM(s) Oral daily  calcitriol   Capsule 0.25 MICROGram(s) Oral daily  carvedilol 12.5 milliGRAM(s) Oral every 12 hours  cefepime   IVPB 2000 milliGRAM(s) IV Intermittent daily  dextrose 5%. 1000 milliLiter(s) (50 mL/Hr) IV Continuous <Continuous>  dextrose 50% Injectable 12.5 Gram(s) IV Push once  dextrose 50% Injectable 25 Gram(s) IV Push once  dextrose 50% Injectable 25 Gram(s) IV Push once  ferrous    sulfate 325 milliGRAM(s) Oral daily  lidocaine   Patch 1 Patch Transdermal daily  pantoprazole    Tablet 40 milliGRAM(s) Oral before breakfast  saccharomyces boulardii 250 milliGRAM(s) Oral two times a day  sacubitril 49 mG/valsartan 51 mG 1 Tablet(s) Oral two times a day  vancomycin  IVPB 1000 milliGRAM(s) IV Intermittent <User Schedule>    MEDICATIONS  (PRN):  dextrose 40% Gel 15 Gram(s) Oral once PRN Blood Glucose LESS THAN 70 milliGRAM(s)/deciliter  glucagon  Injectable 1 milliGRAM(s) IntraMuscular once PRN Glucose LESS THAN 70 milligrams/deciliter 58y Female,   Patient is a 58y old  Female who presents with a chief complaint of sent from pulmonology office (13 Feb 2019 16:55) abnormal imaging with R loculated effusion, overall anasarca- slow improvement     INTERVAL/OVERNIGHT EVENTS:  Romanian Interpretor - Kari   Patient examined at beside. No acute events overnight.   Patient states her SOB persists but is very much improved. Feels she is less swollen, does cough at times but non productive. States she has been able to get OOB to chair twice yesterday and was able to walk with assistance during PT eval.   Patient was NPO since midnight, for paracentesis today, denies nausea/vomiting/diarrhea/abdominal pain. Last BM yesterday. Voiding small quantity of urine, w/o changes. Patient denies chest pain, calf pain, abdominal pain, headaches, fever, chills, dysuria, hematuria, diarrhea, constipation, SOB, palpitations.   Rest of ROS not contributory except for above.      CARDIAC MONITOR  OXYGEN: Nasal Cannula   3 L/min       POCT Blood Glucose.: 197 mg/dL (13 Feb 2019 21:32)  POCT Blood Glucose.: 168 mg/dL (13 Feb 2019 11:06)      T(C): 36.8 (02-13-19 @ 23:04), Max: 36.8 (02-13-19 @ 23:04)  HR: 71 (02-14-19 @ 05:12) (70 - 76)  BP: 159/83 (02-14-19 @ 05:12) (138/74 - 159/83)  RR: 17 (02-13-19 @ 23:04) (17 - 18)    Vital signs reviewed.  GENERAL: Examined at bedside, not in acute distress, alert and oriented x3. Cooperative with exam.   HEENT: moist mucous membranes. Clear conjunctiva   NECK: Supple.  CARDIOVASCULAR: Normal S1 and S2, No murmur, RRR.  RESPIRATORY: B/l bibasilar crackles present predominantly on R side, RLL diminished bs    GASTROINTESTINAL: Soft, nontender. Mildly distended +ve fluid shift bs normoactive   EXTREMITIES: No cyanosis, no edema. Left arm AVF  Bruit +  SKIN: warm and dry skin turgor wnl      LABS                          10.7   3.2   )-----------( 206      ( 14 Feb 2019 06:04 )             35.3         02-14    134<L>  |  93<L>  |  25.0<H>  ----------------------------<  148<H>  4.3   |  27.0  |  4.53<H>    Ca    8.3<L>      14 Feb 2019 06:04  Phos  4.9     02-14  Mg     2.2     02-14      PT/INR - ( 14 Feb 2019 06:04 )   PT: 13.5 sec;   INR: 1.17 ratio         PTT - ( 14 Feb 2019 06:04 )  PTT:30.8 sec  IMAGING    < from: US Abdomen Limited (02.13.19 @ 15:52) >  FINDINGS:    Sonographic evaluation of 4 quadrants of the abdomen demonstrates mild to   moderate ascites.    Nonspecific gallbladder wall thickening is noted measuring 8 mm. There is   possibility of a 4 mm sized adherent calculus within the gallbladder.    Visualized right kidney demonstrates increased cortical echogenicity   indicating renal parenchymal disease.    There is evidence of bilateral pleural effusions.      IMPRESSION:     Mild to moderate ascites.    Renal parenchymal disease involving the visualized right kidney.    Additional findings as above.    < end of copied text >      DIET: NPO     MEDICATIONS  (STANDING):  amLODIPine   Tablet 2.5 milliGRAM(s) Oral daily  aspirin enteric coated 81 milliGRAM(s) Oral daily  calcitriol   Capsule 0.25 MICROGram(s) Oral daily  carvedilol 12.5 milliGRAM(s) Oral every 12 hours  cefepime   IVPB 2000 milliGRAM(s) IV Intermittent daily  dextrose 5%. 1000 milliLiter(s) (50 mL/Hr) IV Continuous <Continuous>  dextrose 50% Injectable 12.5 Gram(s) IV Push once  dextrose 50% Injectable 25 Gram(s) IV Push once  dextrose 50% Injectable 25 Gram(s) IV Push once  ferrous    sulfate 325 milliGRAM(s) Oral daily  lidocaine   Patch 1 Patch Transdermal daily  pantoprazole    Tablet 40 milliGRAM(s) Oral before breakfast  saccharomyces boulardii 250 milliGRAM(s) Oral two times a day  sacubitril 49 mG/valsartan 51 mG 1 Tablet(s) Oral two times a day  vancomycin  IVPB 1000 milliGRAM(s) IV Intermittent <User Schedule>    MEDICATIONS  (PRN):  dextrose 40% Gel 15 Gram(s) Oral once PRN Blood Glucose LESS THAN 70 milliGRAM(s)/deciliter  glucagon  Injectable 1 milliGRAM(s) IntraMuscular once PRN Glucose LESS THAN 70 milligrams/deciliter

## 2019-02-14 NOTE — PROGRESS NOTE ADULT - SUBJECTIVE AND OBJECTIVE BOX
[facility]  INFECTIOUS DISEASES AND INTERNAL MEDICINE at [place]  =======================================================  Jose Carlos Servin MD  Diplomates American Board of Internal Medicine and Infectious Diseases  =======================================================    MRN-[id number]  JAROCHO MORALES     Follow up: loculated pleural effusion    NAD no respiratory distress. No cough. Sitting comfortably on chair at bedside without any supplemental O2.   Afebrile.     PAST MEDICAL & SURGICAL HISTORY:  Coronary artery disease, angina presence unspecified, unspecified vessel or lesion type, unspecified whether native or transplanted heart  Paroxysmal atrial fibrillation  Anemia due to chronic kidney disease, unspecified CKD stage  Chronic systolic congestive heart failure  Pleural effusion  Pericardial effusion  End stage renal disease on dialysis  HLD (hyperlipidemia)  HTN (hypertension)  DM (diabetes mellitus)  A-V fistula  Encounter for dialysis catheter care    Social Hx: no smoking or other toxic habits    FAMILY HISTORY:  Family history of kidney disease in brother (Sibling)    Allergies  No Known Allergies    Antibiotics:  cefepime   IVPB 2000 milliGRAM(s) IV Intermittent daily  vancomycin  IVPB 1000 milliGRAM(s) IV Intermittent every 48 hours     REVIEW OF SYSTEMS:  CONSTITUTIONAL:  No Fever or chills  HEENT:  No diplopia or blurred vision.  No sore throat or runny nose.  CARDIOVASCULAR:  No chest pain or SOB.  RESPIRATORY:  +Cough, +shortness of breath, PND or orthopnea.  GASTROINTESTINAL:  No nausea, vomiting or diarrhea.  GENITOURINARY:  No dysuria, frequency or urgency. No Blood in urine  MUSCULOSKELETAL:  no joint aches, no muscle pain  SKIN:  No change in skin, hair or nails.  NEUROLOGIC:  No paresthesias, fasciculations, seizures or weakness.  PSYCHIATRIC:  No disorder of thought or mood.  ENDOCRINE:  No heat or cold intolerance, polyuria or polydipsia.  HEMATOLOGICAL:  No easy bruising or bleeding.     Physical Exam:  Vital Signs Last 24 Hrs  T(C): 37 (14 Feb 2019 07:43), Max: 37 (14 Feb 2019 07:43)  T(F): 98.6 (14 Feb 2019 07:43), Max: 98.6 (14 Feb 2019 07:43)  HR: 73 (14 Feb 2019 07:43) (70 - 74)  BP: 156/83 (14 Feb 2019 07:43) (138/74 - 159/83)  RR: 18 (14 Feb 2019 07:43) (17 - 18)  GEN: NAD on o2  HEENT: normocephalic and atraumatic. EOMI. PERRL.    NECK: Supple.  No lymphadenopathy   LUNGS: Decreased BS in R lung in general, R lung with rales all over and left lung only on base.   HEART: Regular rate and rhythm   ABDOMEN: Soft, nontender, and nondistended.  Positive bowel sounds.    : No CVA tenderness  EXTREMITIES: Without any cyanosis, clubbing, rash, lesions or edema.  NEUROLOGIC: grossly intact.  PSYCHIATRIC: Appropriate affect .  SKIN: No ulceration or induration present.    Labs:  02-14    134<L>  |  93<L>  |  25.0<H>  ----------------------------<  148<H>  4.3   |  27.0  |  4.53<H>    Ca    8.3<L>      14 Feb 2019 06:04  Phos  4.9     02-14  Mg     2.2     02-14                        10.7   3.2   )-----------( 206      ( 14 Feb 2019 06:04 )             35.3     PT/INR - ( 14 Feb 2019 06:04 )   PT: 13.5 sec;   INR: 1.17 ratio    PTT - ( 14 Feb 2019 06:04 )  PTT:30.8 sec    RECENT CULTURES:  02-09 @ 23:23      NotDetec    02-09 @ 13:04 .Blood     No growth at 48 hours      All imaging and other data have been reviewed.    Impression: Chest CT 2/9  -Right perihilar consolidation and right interlobular septal thickening;   differential diagnosis includes malignancy and pneumonia.  -Small-moderate right pleural effusion is loculated, differential   diagnosis includes malignancy, parapneumonic effusion, or empyema.  -Severe global cardiomegaly.  -Small-moderate ascites, unclear etiology.

## 2019-02-14 NOTE — PROGRESS NOTE ADULT - SUBJECTIVE AND OBJECTIVE BOX
NEPHROLOGY INTERVAL HPI/OVERNIGHT EVENTS:  pt clinically stable  no acute distress noted    MEDICATIONS  (STANDING):  amLODIPine   Tablet 2.5 milliGRAM(s) Oral daily  aspirin enteric coated 81 milliGRAM(s) Oral daily  calcitriol   Capsule 0.25 MICROGram(s) Oral daily  carvedilol 12.5 milliGRAM(s) Oral every 12 hours  cefepime   IVPB 2000 milliGRAM(s) IV Intermittent daily  dextrose 5%. 1000 milliLiter(s) (50 mL/Hr) IV Continuous <Continuous>  dextrose 50% Injectable 12.5 Gram(s) IV Push once  dextrose 50% Injectable 25 Gram(s) IV Push once  dextrose 50% Injectable 25 Gram(s) IV Push once  ferrous    sulfate 325 milliGRAM(s) Oral daily  lidocaine   Patch 1 Patch Transdermal daily  pantoprazole    Tablet 40 milliGRAM(s) Oral before breakfast  saccharomyces boulardii 250 milliGRAM(s) Oral two times a day  sacubitril 49 mG/valsartan 51 mG 1 Tablet(s) Oral two times a day  vancomycin  IVPB 1000 milliGRAM(s) IV Intermittent <User Schedule>    MEDICATIONS  (PRN):  dextrose 40% Gel 15 Gram(s) Oral once PRN Blood Glucose LESS THAN 70 milliGRAM(s)/deciliter  glucagon  Injectable 1 milliGRAM(s) IntraMuscular once PRN Glucose LESS THAN 70 milligrams/deciliter      Allergies    No Known Allergies          Vital Signs Last 24 Hrs  T(C): 36.3 (14 Feb 2019 12:20), Max: 37 (14 Feb 2019 07:43)  T(F): 97.4 (14 Feb 2019 12:20), Max: 98.6 (14 Feb 2019 07:43)  HR: 71 (14 Feb 2019 12:20) (70 - 74)  BP: 155/85 (14 Feb 2019 12:20) (138/74 - 159/83)  BP(mean): --  RR: 18 (14 Feb 2019 12:20) (17 - 18)  SpO2: 98% (14 Feb 2019 12:20) (98% - 98%)    PHYSICAL EXAM:  GENERAL: Mild sob  NECK: Supple, No jvd  NERVOUS SYSTEM:  A/O x3, non focal  CHEST:  Clear with diminished basilar breath sounds (L > R)  HEART:  RRR, No rub  ABDOMEN: Soft, NT/ND BS+  EXTREMITIES:  + LE edema  SKIN: No rashes    LABS:                        10.7   3.2   )-----------( 206      ( 14 Feb 2019 06:04 )             35.3     02-14    134<L>  |  93<L>  |  25.0<H>  ----------------------------<  148<H>  4.3   |  27.0  |  4.53<H>    Ca    8.3<L>      14 Feb 2019 06:04  Phos  4.9     02-14  Mg     2.2     02-14      PT/INR - ( 14 Feb 2019 06:04 )   PT: 13.5 sec;   INR: 1.17 ratio         PTT - ( 14 Feb 2019 06:04 )  PTT:30.8 sec    Magnesium, Serum: 2.2 mg/dL (02-14 @ 06:04)  Phosphorus Level, Serum: 4.9 mg/dL (02-14 @ 06:04)      RADIOLOGY & ADDITIONAL TESTS:  < from: US Abdomen Limited (02.13.19 @ 15:52) >   EXAM:  US ABDOMEN LIMITED                          PROCEDURE DATE:  02/13/2019          INTERPRETATION:  CLINICAL INFORMATION: The patient is referred for   evaluation of ascites.    COMPARISON: CT abdomen dated 02/09/2019.    TECHNIQUE: Limited abdominal sonography.     FINDINGS:    Sonographic evaluation of 4 quadrants of the abdomen demonstrates mild to   moderate ascites.    Nonspecific gallbladder wall thickening is noted measuring 8 mm. There is   possibility of a 4 mm sized adherent calculus within the gallbladder.    Visualized right kidney demonstrates increased cortical echogenicity   indicating renal parenchymal disease.    There is evidence of bilateral pleural effusions.      IMPRESSION:     Mild to moderate ascites.    Renal parenchymal disease involving the visualized right kidney.    Additional findings as above.    < end of copied text >

## 2019-02-15 ENCOUNTER — RESULT REVIEW (OUTPATIENT)
Age: 59
End: 2019-02-15

## 2019-02-15 LAB
B PERT IGG+IGM PNL SER: CLEAR — SIGNIFICANT CHANGE UP
COLOR FLD: YELLOW
FLUID INTAKE SUBSTANCE CLASS: SIGNIFICANT CHANGE UP
FLUID SEGMENTED GRANULOCYTES: 7 % — SIGNIFICANT CHANGE UP
GLUCOSE BLDC GLUCOMTR-MCNC: 142 MG/DL — HIGH (ref 70–99)
GLUCOSE BLDC GLUCOMTR-MCNC: 158 MG/DL — HIGH (ref 70–99)
GLUCOSE BLDC GLUCOMTR-MCNC: 167 MG/DL — HIGH (ref 70–99)
GLUCOSE BLDC GLUCOMTR-MCNC: 176 MG/DL — HIGH (ref 70–99)
GRAM STN FLD: SIGNIFICANT CHANGE UP
LYMPHOCYTES # FLD: 17 % — SIGNIFICANT CHANGE UP
MESOTHL CELL # FLD: 19 % — SIGNIFICANT CHANGE UP
MONOS+MACROS # FLD: 57 % — SIGNIFICANT CHANGE UP
RCV VOL RI: 555 /UL — HIGH (ref 0–5)
SPECIMEN SOURCE FLD: SIGNIFICANT CHANGE UP
SPECIMEN SOURCE: SIGNIFICANT CHANGE UP
TOTAL NUCLEATED CELL COUNT, BODY FLUID: 174 /UL — HIGH (ref 0–5)
TUBE TYPE: SIGNIFICANT CHANGE UP

## 2019-02-15 PROCEDURE — 71045 X-RAY EXAM CHEST 1 VIEW: CPT | Mod: 26

## 2019-02-15 PROCEDURE — 99232 SBSQ HOSP IP/OBS MODERATE 35: CPT

## 2019-02-15 PROCEDURE — 88305 TISSUE EXAM BY PATHOLOGIST: CPT | Mod: 26

## 2019-02-15 PROCEDURE — 99232 SBSQ HOSP IP/OBS MODERATE 35: CPT | Mod: GC

## 2019-02-15 PROCEDURE — 88112 CYTOPATH CELL ENHANCE TECH: CPT | Mod: 26

## 2019-02-15 RX ORDER — ASCORBIC ACID 60 MG
500 TABLET,CHEWABLE ORAL DAILY
Qty: 0 | Refills: 0 | Status: DISCONTINUED | OUTPATIENT
Start: 2019-02-15 | End: 2019-02-19

## 2019-02-15 RX ORDER — POLYETHYLENE GLYCOL 3350 17 G/17G
17 POWDER, FOR SOLUTION ORAL DAILY
Qty: 0 | Refills: 0 | Status: DISCONTINUED | OUTPATIENT
Start: 2019-02-15 | End: 2019-02-19

## 2019-02-15 RX ADMIN — CARVEDILOL PHOSPHATE 12.5 MILLIGRAM(S): 80 CAPSULE, EXTENDED RELEASE ORAL at 06:12

## 2019-02-15 RX ADMIN — Medication 500 MILLIGRAM(S): at 18:19

## 2019-02-15 RX ADMIN — LIDOCAINE 1 PATCH: 4 CREAM TOPICAL at 18:18

## 2019-02-15 RX ADMIN — SACUBITRIL AND VALSARTAN 1 TABLET(S): 24; 26 TABLET, FILM COATED ORAL at 18:19

## 2019-02-15 RX ADMIN — CARVEDILOL PHOSPHATE 12.5 MILLIGRAM(S): 80 CAPSULE, EXTENDED RELEASE ORAL at 18:18

## 2019-02-15 RX ADMIN — CALCITRIOL 0.25 MICROGRAM(S): 0.5 CAPSULE ORAL at 13:30

## 2019-02-15 RX ADMIN — Medication 250 MILLIGRAM(S): at 06:12

## 2019-02-15 RX ADMIN — Medication 250 MILLIGRAM(S): at 18:19

## 2019-02-15 RX ADMIN — POLYETHYLENE GLYCOL 3350 17 GRAM(S): 17 POWDER, FOR SOLUTION ORAL at 18:18

## 2019-02-15 RX ADMIN — Medication 81 MILLIGRAM(S): at 13:30

## 2019-02-15 RX ADMIN — CEFEPIME 100 MILLIGRAM(S): 1 INJECTION, POWDER, FOR SOLUTION INTRAMUSCULAR; INTRAVENOUS at 13:30

## 2019-02-15 RX ADMIN — AMLODIPINE BESYLATE 2.5 MILLIGRAM(S): 2.5 TABLET ORAL at 06:12

## 2019-02-15 RX ADMIN — SACUBITRIL AND VALSARTAN 1 TABLET(S): 24; 26 TABLET, FILM COATED ORAL at 06:12

## 2019-02-15 RX ADMIN — PANTOPRAZOLE SODIUM 40 MILLIGRAM(S): 20 TABLET, DELAYED RELEASE ORAL at 06:12

## 2019-02-15 RX ADMIN — Medication 325 MILLIGRAM(S): at 13:30

## 2019-02-15 NOTE — PROGRESS NOTE ADULT - ASSESSMENT
57 yo female with hx of ESRD on HD MWF, chronic HFrEF (EF 35-40% TTE 02/11/19), NICM (cardiac cath 03/18 non-obstructive CAD), paroxysmal Afib (no AC due to GI bleed), uremic pericarditis s/p pericardiocentesis, right sided pleural effusion s/p pigtail catheter, IDDm, and HTN with recent hospitalization in [facility] 2/2 positive Influenza A complicated with right pleural effusion requiring thoracocentesis and discharged home on supplemental O2 3L/min, currently presenting to ED referred from Pulmonologist after finding of loculated R pleural effusion on CT chest of unclear etiology, associated with dyspnea on exertion and noted peripheral edema, with moderate ascites.   Pt received consecutive HD sessions x 2 to offload fluid 2/11 and 2/12 with improvement in sob.     CT sx/ IR and pulm consulted. As per CT sx need drainage of ascitic fluid prior to draining R loculated effusion for further evaluation of etiology, but given overall fluid overload state will c/w offloading fluid with HD and then consider further intervention with paracentesis/ thoracocentesis.     TTE completed and noted drop in EF from 55% to 35-40% and mobile density noted on aortic valve leaflets, upon review of prior TTE, the latter finding was already present and EF might have been overestimated as per cardiology. 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     Pt has been npo every day x 3 days for possible paracentesis but given IR scheduling has been postponed. Probably to have procedure done today. CXR control done today pending official report, impresses improvement of effusions.     Dyspnea on exertion likely multifactiorial 2/2 loculated R pleural effusion with concomitant fluid overload noted peripheral edema/ ascites in the setting ESRD/ CHF. clinical concern for infectious process given recent influenza infection   - Patient  remains afebrile w/o leucocytosis    - SOB is improved and at her baseline O2 requirement of 3l via NC   - elevated procalcitonin/ esr   -Negative RF/ HIV - quantiferon negative   - c/w Vanco and cefepime empirically    -vanco trough per pharmacy   - c/w Florastor while on ABX  - Blood culture negative x 2 repeat set is testing   - c/w offloading excess fluid with HD  - ID f/u noted and appreciated, awaiting fluid removal for fluid analysis  - CXR control done, pending official report.   - Pulmonology f/u noted and appreciated    - CT sx and IR consulted d/w Dr. Saenz and Dr. Null in regards to the current plan of care. As per CT sx much more beneficial to offload ascitic fluid prior to any intervention for the loculated effusion. D/w them both will aggressively offload fluid with consecutive HD sessions, US showing mild-moderate ascites. As per Dr. Null the ascites fluid must be drained prior to thoracocentesis being done. Requested Dr. Saenz to possibly due to paracentesis and thoracocentesis in the am but reports IR can only do paracentesis in the am.   - Paracentesis had been postponed due to IR scheduling/ contemplation if paracentesis is beneficial as per IR. If and when thoracocentesis is needed IR to drain for dx purposes.     Chronic systolic HFrEF: 35-40%   - Improved peripheral edema, ascites and crackles in lungs with slight improvement.   - Previous 11/2018: EF 55-55%  -- > 02/2019 35- 40%  - BNP: > 70,000  - c/w Strict I/o   -Fluid restriction    - daily weight   - c/w entresto po bid   - c/w coreg po bid   - c/w offloading fluid with HD  - TTE with drop in ef to 35-40%, pt had prior improved EF on echo 11/2018 ef:55%,as per cardio  TTE in 11/2018 with likely overestimated ejection fraction. 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    ESRD on HD   - s/p x2 consecutive HD sessions for offloading fluid, to c/w HD tu th sat   - will monitor electrolytes closely   - c/w calcitriol 0.25mg po qd   - Nephro f/u noted and appreciated   - pt usually has [facility] schedule as an outpt     DM2  - Last HbA1C: 6.2 on 01/01/2019  - fs low normal  and had episode of hypoglycemia on 2/10/19   - no HSS coverage   - c/w hypoglycemia protocol   - pt should c/w lifestyle/ diet modifications     HTN  - bp stable   -c/w Coreg 12.5mg BID PO  - c/w Amlodipine 2.5 QD PO  - c/w Entresto 49/51 PO QD    Anemia in ESRD  - h/h stable  - c/w monitoring h/h     Prophylactic measure:  Holding AC in anticipation of IR guided intervention in the am   Florastor while on Abx  Omeprazole 40mg PO    Advanced Directive: Full code   Next of kin: Son Feliciano     Dispo: Pt at baseline walks independently and has chronic O2 at home. Lives with her son. PT consulted and recommended home w/ assist but since evaluation pt has been independently ambulating to the bathroom and around her room. Will consider new PT eval once procedures are perform for discharge planning purposes. 57 yo female with hx of ESRD on HD MWF, chronic HFrEF (EF 35-40% TTE 02/11/19), NICM (cardiac cath 03/18 non-obstructive CAD), paroxysmal Afib (no AC due to GI bleed), uremic pericarditis s/p pericardiocentesis, right sided pleural effusion s/p pigtail catheter, IDDM, and HTN with recent hospitalization in [facility] 2/2 positive Influenza A complicated with right pleural effusion requiring thoracocentesis and discharged home on supplemental O2 3L/min, currently presenting to ED referred from Pulmonologist after finding of loculated R pleural effusion on CT chest of unclear etiology, associated with dyspnea on exertion and noted peripheral edema, with moderate ascites.   Pt received consecutive HD sessions x 2 to offload fluid 2/11 and 2/12 with improvement in sob.       CT sx/ IR and pulm consulted. As per CT sx need drainage of ascitic fluid prior to draining R loculated effusion for further evaluation of etiology, but given overall fluid overload state will c/w offloading fluid with HD and then consider further intervention with paracentesis/ thoracocentesis.     TTE completed and noted drop in EF from 55% to 35-40% and mobile density noted on aortic valve leaflets, upon review of prior TTE, the latter finding was already present and EF might have been overestimated as per cardiology. 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     Pt has been npo every day x 3 days for possible paracentesis but given IR scheduling has been postponed. Probably to have procedure done today.     Dyspnea on exertion likely multifactorial 2/2 loculated R pleural effusion with concomitant fluid overload noted peripheral edema/ ascites in the setting ESRD/ CHF. clinical concern for infectious process given recent influenza infection (with leukopenia that is unchanged)  - Patient  remains afebrile w/o leucocytosis    - SOB is improved and at her baseline O2 requirement of 3l via NC   -blood cultures are negative   -Has been on vanco and cefepime x 7 days, changed to cefepime alone per ID  - c/w Florastor while on ABX  - c/w offloading excess fluid with HD  - ID f/u noted and appreciated, awaiting fluid removal for fluid analysis  - CXR shows overall improvement   - Pulmonology f/u noted and appreciated    - CT sx and IR consulted d/w Dr. Saenz and Dr. Null in regards to the current plan of care. As per CT sx much more beneficial to offload ascitic fluid prior to any intervention for the loculated effusion. As per Dr. Null the ascites fluid must be drained prior to thoracocentesis being done. Requested Dr. Saenz to possibly due to paracentesis and thoracocentesis in the am but reports IR can only do paracentesis in the am.   - Paracentesis had been postponed due to IR scheduling/ contemplation if paracentesis is beneficial as per IR. If and when thoracocentesis is needed IR to drain for dx purposes. However, given abx for this length of time, unlikely to localize any organisms. For now, to proceed with paracentesis and subsequently reassess    Chronic systolic HFrEF: 35-40%   - Improved peripheral edema, ascites and crackles in lungs with slight improvement.   - Previous 11/2018: EF 55-55%  -- > 02/2019 35- 40%  - BNP: > 70,000  - c/w Strict I/o   -Fluid restriction    - daily weight   - c/w entresto po bid, coreg po bid   - c/w offloading fluid with HD  - Non obstructive CAD by cath in 3/2018     ESRD on HD (MWF via Left AVF) with normocytic anemia   - s/p x2 consecutive HD sessions for offloading fluid, to c/w HD tu th sat   - c/w calcitriol 0.25mg po qd   - Nephro f/u noted and appreciated     DM2  - Last HbA1C: 6.2 on 01/01/2019  - fs low normal  and had episode of hypoglycemia on 2/10/19   - c/w hypoglycemia protocol     HTN  - bp stable   -c/w Coreg 12.5mg BID PO, Amlodipine 2.5 QD PO, Entresto 49/51 PO QD    Prophylactic measure:  Holding AC in anticipation of IR guided intervention in the am   Florastor while on Abx  Omeprazole 40mg PO    Advanced Directive: Full code   Daughter in law is emergency contact (mary), spoken to with update. States that patient is constipated and goes days w/o a BM, patient telling us that she is going every day. Will follow up in am.     Dispo: Pt at baseline walks independently and has chronic O2 at home. Lives with her son. PT consulted and recommended home w/ assist but since evaluation pt has been independently ambulating to the bathroom and around her room.

## 2019-02-15 NOTE — PROGRESS NOTE ADULT - SUBJECTIVE AND OBJECTIVE BOX
58y Female,   Patient is a 58y old  Female who presents with a chief complaint of sent from pulmonology office (14 Feb 2019 12:32)    HOSPITALIZATION DAY: 6   [address] [address]    Antibiotic:         Vancomycin/ Cefepime                          Started on:   02/09    INTERVAL/OVERNIGHT EVENTS:        OXYGEN:   Nasal Cannula   3 L/min     O2 Sat:  97 %    I&O's Summary    14 Feb 2019 07:01  -  15 Feb 2019 06:05  --------------------------------------------------------  IN: 0 mL / OUT: 3001 mL / NET: -3001 mL         CAPILLARY BLOOD GLUCOSE    POCT Blood Glucose.: 161 mg/dL (14 Feb 2019 21:38)  POCT Blood Glucose.: 95 mg/dL (14 Feb 2019 16:47)  POCT Blood Glucose.: 141 mg/dL (14 Feb 2019 12:07)      T(C): 36.7 (02-14-19 @ 23:55), Max: 37 (02-14-19 @ 07:43)  HR: 73 (02-15-19 @ 04:49) (71 - 77)  BP: 145/85 (02-15-19 @ 04:49) (140/81 - 168/78)  RR: 18 (02-14-19 @ 23:55) (16 - 18)  SpO2: 97% (02-14-19 @ 23:55) (97% - 99%)      PHYSICAL EXAM: Vital signs reviewed.            LABS  __________________                        10.7   3.2   )-----------( 206      ( 14 Feb 2019 06:04 )             35.3         02-14    134<L>  |  93<L>  |  25.0<H>  ----------------------------<  148<H>  4.3   |  27.0  |  4.53<H>    Ca    8.3<L>      14 Feb 2019 06:04  Phos  4.9     02-14  Mg     2.2     02-14      PT/INR - ( 14 Feb 2019 06:04 )   PT: 13.5 sec;   INR: 1.17 ratio         PTT - ( 14 Feb 2019 06:04 )  PTT:30.8 sec    DIET: NPO since midnight for possible Paracentesis    MEDICATIONS  (STANDING):  amLODIPine   Tablet 2.5 milliGRAM(s) Oral daily  aspirin enteric coated 81 milliGRAM(s) Oral daily  calcitriol   Capsule 0.25 MICROGram(s) Oral daily  carvedilol 12.5 milliGRAM(s) Oral every 12 hours  cefepime   IVPB 2000 milliGRAM(s) IV Intermittent daily  dextrose 5%. 1000 milliLiter(s) (50 mL/Hr) IV Continuous <Continuous>  dextrose 50% Injectable 12.5 Gram(s) IV Push once  dextrose 50% Injectable 25 Gram(s) IV Push once  dextrose 50% Injectable 25 Gram(s) IV Push once  ferrous    sulfate 325 milliGRAM(s) Oral daily  influenza   Vaccine 0.5 milliLiter(s) IntraMuscular once  lidocaine   Patch 1 Patch Transdermal daily  pantoprazole    Tablet 40 milliGRAM(s) Oral before breakfast  saccharomyces boulardii 250 milliGRAM(s) Oral two times a day  sacubitril 49 mG/valsartan 51 mG 1 Tablet(s) Oral two times a day  vancomycin  IVPB 1000 milliGRAM(s) IV Intermittent <User Schedule>    MEDICATIONS  (PRN):  dextrose 40% Gel 15 Gram(s) Oral once PRN Blood Glucose LESS THAN 70 milliGRAM(s)/deciliter  glucagon  Injectable 1 milliGRAM(s) IntraMuscular once PRN Glucose LESS THAN 70 milligrams/deciliter 58y Female,   Patient is a 58y old  Female who presents with a chief complaint of sent from pulmonology office (14 Feb 2019 12:32)    HOSPITALIZATION DAY: 6   [facility] [address] [address]    Antibiotic:         Vancomycin/ Cefepime                          Started on:   02/09    INTERVAL/OVERNIGHT EVENTS:     Patient examined at beside. No acute events overnight.   Patient states her SOB persists but is very much improved, without change from previous da. Feels she is less swollen, cough persist and non productive. States she had a mild nose bleed after cleaning her nares with a tissue  Patient states she has been more active and walked to the bathroom and briefly on the hallway, OOB to chair twice yesterday.  Patient was NPO since midnight, for paracentesis today, denies nausea/vomiting/diarrhea/abdominal pain. Last BM yesterday. Voiding small quantity of urine, w/o changes. Patient denies chest pain, calf pain, abdominal pain, headaches, fever, chills, dysuria, hematuria, diarrhea, constipation, SOB, palpitations.   Rest of ROS not contributory except for above.    OXYGEN:   Nasal Cannula   3 L/min     O2 Sat:  97 %    I&O's Summary    14 Feb 2019 07:01  -  15 Feb 2019 06:05  --------------------------------------------------------  IN: 0 mL / OUT: 3001 mL / NET: -3001 mL       CAPILLARY BLOOD GLUCOSE    POCT Blood Glucose.: 161 mg/dL (14 Feb 2019 21:38)  POCT Blood Glucose.: 95 mg/dL (14 Feb 2019 16:47)  POCT Blood Glucose.: 141 mg/dL (14 Feb 2019 12:07)      T(C): 36.7 (02-14-19 @ 23:55), Max: 37 (02-14-19 @ 07:43)  HR: 73 (02-15-19 @ 04:49) (71 - 77)  BP: 145/85 (02-15-19 @ 04:49) (140/81 - 168/78)  RR: 18 (02-14-19 @ 23:55) (16 - 18)  SpO2: 97% (02-14-19 @ 23:55) (97% - 99%)      PHYSICAL EXAM:   Vital signs reviewed.  GENERAL: Examined at bedside, not in acute distress, alert and oriented x3. Cooperative with exam.   HEENT: moist mucous membranes. Clear conjunctiva   NECK: Supple.  CARDIOVASCULAR: Normal S1 and S2, No murmur, RRR.  RESPIRATORY: B/l bibasilar crackles present predominantly on R side, RLL diminished bs    GASTROINTESTINAL: Soft, nontender. Mildly distended +ve fluid shift bs normoactive   EXTREMITIES: No cyanosis, no edema. Left arm AVF covered with clean dressing,   Bruit +  SKIN: warm and dry skin turgor wnl        LABS  __________________                        10.7   3.2   )-----------( 206      ( 14 Feb 2019 06:04 )             35.3         02-14    134<L>  |  93<L>  |  25.0<H>  ----------------------------<  148<H>  4.3   |  27.0  |  4.53<H>    Ca    8.3<L>      14 Feb 2019 06:04  Phos  4.9     02-14  Mg     2.2     02-14      PT/INR - ( 14 Feb 2019 06:04 )   PT: 13.5 sec;   INR: 1.17 ratio         PTT - ( 14 Feb 2019 06:04 )  PTT:30.8 sec    DIET: NPO since midnight for possible Paracentesis    MEDICATIONS  (STANDING):  amLODIPine   Tablet 2.5 milliGRAM(s) Oral daily  aspirin enteric coated 81 milliGRAM(s) Oral daily  calcitriol   Capsule 0.25 MICROGram(s) Oral daily  carvedilol 12.5 milliGRAM(s) Oral every 12 hours  cefepime   IVPB 2000 milliGRAM(s) IV Intermittent daily  dextrose 5%. 1000 milliLiter(s) (50 mL/Hr) IV Continuous <Continuous>  dextrose 50% Injectable 12.5 Gram(s) IV Push once  dextrose 50% Injectable 25 Gram(s) IV Push once  dextrose 50% Injectable 25 Gram(s) IV Push once  ferrous    sulfate 325 milliGRAM(s) Oral daily  influenza   Vaccine 0.5 milliLiter(s) IntraMuscular once  lidocaine   Patch 1 Patch Transdermal daily  pantoprazole    Tablet 40 milliGRAM(s) Oral before breakfast  saccharomyces boulardii 250 milliGRAM(s) Oral two times a day  sacubitril 49 mG/valsartan 51 mG 1 Tablet(s) Oral two times a day  vancomycin  IVPB 1000 milliGRAM(s) IV Intermittent <User Schedule>    MEDICATIONS  (PRN):  dextrose 40% Gel 15 Gram(s) Oral once PRN Blood Glucose LESS THAN 70 milliGRAM(s)/deciliter  glucagon  Injectable 1 milliGRAM(s) IntraMuscular once PRN Glucose LESS THAN 70 milligrams/deciliter 58y Female,   Patient is a 58y old  Female who presents with a chief complaint of sent from pulmonology office (14 Feb 2019 12:32)    HOSPITALIZATION DAY: 6   Antibiotic:         Vancomycin/ Cefepime                          Started on:   02/09; now only on cefepime    INTERVAL/OVERNIGHT EVENTS:     Patient examined at beside. No acute events overnight.   Patient states her SOB persists but is very much improved, without change from previous da. Feels she is less swollen, cough persist and non productive. States she had a mild nose bleed after cleaning her nares with a tissue  Patient states she has been more active and walked to the bathroom and briefly on the hallway, OOB to chair twice yesterday.  Patient was NPO since midnight, for paracentesis today, denies nausea/vomiting/diarrhea/abdominal pain. Last BM yesterday. Voiding small quantity of urine, w/o changes.    ROS:  Patient denies chest pain, calf pain, abdominal pain, headaches, fever, chills, dysuria, hematuria, diarrhea, constipation, SOB, palpitations.   Rest of ROS not contributory except for above.    OXYGEN:   Nasal Cannula   3 L/min     O2 Sat:  97 %    I&O's Summary    14 Feb 2019 07:01  -  15 Feb 2019 06:05  --------------------------------------------------------  IN: 0 mL / OUT: 3001 mL / NET: -3001 mL       CAPILLARY BLOOD GLUCOSE    POCT Blood Glucose.: 161 mg/dL (14 Feb 2019 21:38)  POCT Blood Glucose.: 95 mg/dL (14 Feb 2019 16:47)  POCT Blood Glucose.: 141 mg/dL (14 Feb 2019 12:07)    Vital Signs   T(C): 36.7 (02-14-19 @ 23:55), Max: 37 (02-14-19 @ 07:43)  HR: 73 (02-15-19 @ 04:49) (71 - 77)  BP: 145/85 (02-15-19 @ 04:49) (140/81 - 168/78)  RR: 18 (02-14-19 @ 23:55) (16 - 18)  SpO2: 97% (02-14-19 @ 23:55) (97% - 99%)    PHYSICAL EXAM:  GENERAL: Examined at bedside, not in acute distress, alert and oriented x3. Cooperative with exam.   HEENT: moist mucous membranes. Clear conjunctiva   CARDIOVASCULAR: Normal S1 and S2, No murmur, RRR.  RESPIRATORY: B/l bibasilar crackles present predominantly on Left side, RLL diminished bs    GASTROINTESTINAL: Soft, nontender. Mildly distended +ve fluid shift bs normoactive   EXTREMITIES: No cyanosis, no edema. Left arm AVF covered with clean dressing,   Bruit +  SKIN: warm and dry skin turgor wnl        LABS  __________________                        10.7   3.2   )-----------( 206      ( 14 Feb 2019 06:04 )             35.3         02-14    134<L>  |  93<L>  |  25.0<H>  ----------------------------<  148<H>  4.3   |  27.0  |  4.53<H>    Ca    8.3<L>      14 Feb 2019 06:04  Phos  4.9     02-14  Mg     2.2     02-14      PT/INR - ( 14 Feb 2019 06:04 )   PT: 13.5 sec;   INR: 1.17 ratio         PTT - ( 14 Feb 2019 06:04 )  PTT:30.8 sec    DIET: NPO since midnight for possible Paracentesis    MEDICATIONS  (STANDING):  amLODIPine   Tablet 2.5 milliGRAM(s) Oral daily  aspirin enteric coated 81 milliGRAM(s) Oral daily  calcitriol   Capsule 0.25 MICROGram(s) Oral daily  carvedilol 12.5 milliGRAM(s) Oral every 12 hours  cefepime   IVPB 2000 milliGRAM(s) IV Intermittent daily  dextrose 5%. 1000 milliLiter(s) (50 mL/Hr) IV Continuous <Continuous>  dextrose 50% Injectable 12.5 Gram(s) IV Push once  dextrose 50% Injectable 25 Gram(s) IV Push once  dextrose 50% Injectable 25 Gram(s) IV Push once  ferrous    sulfate 325 milliGRAM(s) Oral daily  influenza   Vaccine 0.5 milliLiter(s) IntraMuscular once  lidocaine   Patch 1 Patch Transdermal daily  pantoprazole    Tablet 40 milliGRAM(s) Oral before breakfast  saccharomyces boulardii 250 milliGRAM(s) Oral two times a day  sacubitril 49 mG/valsartan 51 mG 1 Tablet(s) Oral two times a day  vancomycin  IVPB 1000 milliGRAM(s) IV Intermittent <User Schedule>    MEDICATIONS  (PRN):  dextrose 40% Gel 15 Gram(s) Oral once PRN Blood Glucose LESS THAN 70 milliGRAM(s)/deciliter  glucagon  Injectable 1 milliGRAM(s) IntraMuscular once PRN Glucose LESS THAN 70 milligrams/deciliter

## 2019-02-15 NOTE — BRIEF OPERATIVE NOTE - PROCEDURE
<<-----Click on this checkbox to enter Procedure Paracentesis with imaging guidance  02/15/2019    Active  [id number]

## 2019-02-15 NOTE — PROGRESS NOTE ADULT - ASSESSMENT
ESRD: + fluid overload  - HD yest volume status better- next HD kiara w additional UF removal    Anemia: MARGI at HD   - cont oral Fe  - trend H/H==> target Hgb>10.0    RO: phos ok  - cont Calcitriol    Will follow

## 2019-02-15 NOTE — PROGRESS NOTE ADULT - SUBJECTIVE AND OBJECTIVE BOX
[facility]  INFECTIOUS DISEASES AND INTERNAL MEDICINE at [place]  =======================================================  Jose Carlos Servin MD  Diplomates American Board of Internal Medicine and Infectious Diseases  =======================================================    MRN-[id number]  JAROCHO MORALES     Follow up: loculated pleural effusion    NAD no respiratory distress. No cough. today she was walking around with physical therapist with no issue (had O2)   Afebrile.     PAST MEDICAL & SURGICAL HISTORY:  Coronary artery disease, angina presence unspecified, unspecified vessel or lesion type, unspecified whether native or transplanted heart  Paroxysmal atrial fibrillation  Anemia due to chronic kidney disease, unspecified CKD stage  Chronic systolic congestive heart failure  Pleural effusion  Pericardial effusion  End stage renal disease on dialysis  HLD (hyperlipidemia)  HTN (hypertension)  DM (diabetes mellitus)  A-V fistula  Encounter for dialysis catheter care    Social Hx: no smoking or other toxic habits    FAMILY HISTORY:  Family history of kidney disease in brother (Sibling)    Allergies  No Known Allergies    Antibiotics:  cefepime   IVPB 2000 milliGRAM(s) IV Intermittent daily  vancomycin  IVPB 1000 milliGRAM(s) IV Intermittent every 48 hours     REVIEW OF SYSTEMS:  CONSTITUTIONAL:  No Fever or chills  HEENT:  No diplopia or blurred vision.  No sore throat or runny nose.  CARDIOVASCULAR:  No chest pain or SOB.  RESPIRATORY:  +Cough, +shortness of breath, PND or orthopnea.  GASTROINTESTINAL:  No nausea, vomiting or diarrhea.  GENITOURINARY:  No dysuria, frequency or urgency. No Blood in urine  MUSCULOSKELETAL:  no joint aches, no muscle pain  SKIN:  No change in skin, hair or nails.  NEUROLOGIC:  No paresthesias, fasciculations, seizures or weakness.  PSYCHIATRIC:  No disorder of thought or mood.  ENDOCRINE:  No heat or cold intolerance, polyuria or polydipsia.  HEMATOLOGICAL:  No easy bruising or bleeding.     Physical Exam:  Vital Signs Last 24 Hrs  T(C): 36.9 (15 Feb 2019 07:50), Max: 36.9 (15 Feb 2019 07:50)  T(F): 98.4 (15 Feb 2019 07:50), Max: 98.4 (15 Feb 2019 07:50)  HR: 71 (15 Feb 2019 07:50) (71 - 77)  BP: 144/75 (15 Feb 2019 07:50) (140/81 - 168/78)  BP(mean): --  RR: 18 (15 Feb 2019 07:50) (16 - 18)  SpO2: 97% (14 Feb 2019 23:55) (97% - 99%)  GEN: NAD on o2  HEENT: normocephalic and atraumatic. EOMI. PERRL.    NECK: Supple.  No lymphadenopathy   LUNGS: Decreased BS in R lung in general, R lung with rales all over and left lung only on base.   HEART: Regular rate and rhythm   ABDOMEN: Soft, nontender, and nondistended.  Positive bowel sounds.    : No CVA tenderness  EXTREMITIES: Without any cyanosis, clubbing, rash, lesions or edema.  NEUROLOGIC: grossly intact.  PSYCHIATRIC: Appropriate affect .  SKIN: No ulceration or induration present.    Labs:  02-14    134<L>  |  93<L>  |  25.0<H>  ----------------------------<  148<H>  4.3   |  27.0  |  4.53<H>    Ca    8.3<L>      14 Feb 2019 06:04  Phos  4.9     02-14  Mg     2.2     02-14                        10.7   3.2   )-----------( 206      ( 14 Feb 2019 06:04 )             35.3     PT/INR - ( 14 Feb 2019 06:04 )   PT: 13.5 sec;   INR: 1.17 ratio    PTT - ( 14 Feb 2019 06:04 )  PTT:30.8 sec    RECENT CULTURES:  02-09 @ 23:23      NotDetec    02-09 @ 13:04 .Blood     No growth at 5 days.      All imaging and other data have been reviewed.    Impression: Chest CT 2/9  -Right perihilar consolidation and right interlobular septal thickening;   differential diagnosis includes malignancy and pneumonia.  -Small-moderate right pleural effusion is loculated, differential   diagnosis includes malignancy, parapneumonic effusion, or empyema.  -Severe global cardiomegaly.  -Small-moderate ascites, unclear etiology.

## 2019-02-15 NOTE — PROGRESS NOTE ADULT - SUBJECTIVE AND OBJECTIVE BOX
NEPHROLOGY INTERVAL HPI/OVERNIGHT EVENTS:    Examined earlier  feeling better    MEDICATIONS  (STANDING):  amLODIPine   Tablet 2.5 milliGRAM(s) Oral daily  aspirin enteric coated 81 milliGRAM(s) Oral daily  calcitriol   Capsule 0.25 MICROGram(s) Oral daily  carvedilol 12.5 milliGRAM(s) Oral every 12 hours  cefepime   IVPB 2000 milliGRAM(s) IV Intermittent daily  dextrose 5%. 1000 milliLiter(s) (50 mL/Hr) IV Continuous <Continuous>  dextrose 50% Injectable 12.5 Gram(s) IV Push once  dextrose 50% Injectable 25 Gram(s) IV Push once  dextrose 50% Injectable 25 Gram(s) IV Push once  ferrous    sulfate 325 milliGRAM(s) Oral daily  influenza   Vaccine 0.5 milliLiter(s) IntraMuscular once  lidocaine   Patch 1 Patch Transdermal daily  pantoprazole    Tablet 40 milliGRAM(s) Oral before breakfast  saccharomyces boulardii 250 milliGRAM(s) Oral two times a day  sacubitril 49 mG/valsartan 51 mG 1 Tablet(s) Oral two times a day    MEDICATIONS  (PRN):  dextrose 40% Gel 15 Gram(s) Oral once PRN Blood Glucose LESS THAN 70 milliGRAM(s)/deciliter  glucagon  Injectable 1 milliGRAM(s) IntraMuscular once PRN Glucose LESS THAN 70 milligrams/deciliter      Allergies    No Known Allergies    Intolerances        Vital Signs Last 24 Hrs  T(C): 36.9 (15 Feb 2019 07:50), Max: 36.9 (15 Feb 2019 07:50)  T(F): 98.4 (15 Feb 2019 07:50), Max: 98.4 (15 Feb 2019 07:50)  HR: 71 (15 Feb 2019 07:50) (71 - 77)  BP: 144/75 (15 Feb 2019 07:50) (140/81 - 168/78)  BP(mean): --  RR: 18 (15 Feb 2019 07:50) (16 - 18)  SpO2: 97% (2019 23:55) (97% - 99%)  Daily     Daily Weight in k (2019 17:30)    PHYSICAL EXAM:  GENERAL: Mild sob  NECK: Supple, No jvd  NERVOUS SYSTEM:  A/O x3, non focal  CHEST:  Clear with diminished basilar breath sounds (L > R)  HEART:  RRR, No rub  ABDOMEN: Soft, NT/ND BS+  EXTREMITIES:  + LE edema    LABS:                        10.7   3.2   )-----------( 206      ( 2019 06:04 )             35.3     02-14    134<L>  |  93<L>  |  25.0<H>  ----------------------------<  148<H>  4.3   |  27.0  |  4.53<H>    Ca    8.3<L>      2019 06:04  Phos  4.9     02-14  Mg     2.2     02-14      PT/INR - ( 2019 06:04 )   PT: 13.5 sec;   INR: 1.17 ratio         PTT - ( 2019 06:04 )  PTT:30.8 sec            RADIOLOGY & ADDITIONAL TESTS:

## 2019-02-15 NOTE — PHARMACOTHERAPY INTERVENTION NOTE - COMMENTS
Blood cultures from 2/9 are negative   It was recommended to d/c vancomycin given the cultures and low risk of MRSA.

## 2019-02-15 NOTE — PROGRESS NOTE ADULT - ASSESSMENT
57 y/o woman with PMH of ESRD on HD, DM2, Paroxysmal AFib, CHF with recent hospitalization in [facility] with + flu and right pleural effusion s/p thoracocentesis and sent home with supplemental O2 3L brought in from home, during her outpt follow up had a CXR that showed loculated effusion so Dr. Nolasco send her for admission and work up.   Chest CT in [facility] showed multiple loculations in right lung.     Pneumonia  Loculated pleural effusion  ESRD     - Blood culture negative x 2   - Procalcitonin 0.20  - Leukopenia in baseline   - Afebrile  - HIV and RVP negative   - Seen by CT surgery and pulmonary, was advised IR paracentesis and thoracentesis but not done yet.   - Please send the fluid for analysis and culture after procedure.   - Continue cefepime 2gm daily   - Can stop vancomycin   - Will switch ABx regimen based on culture results.     Will follow.

## 2019-02-16 LAB
ALBUMIN FLD-MCNC: 2.1 G/DL — SIGNIFICANT CHANGE UP
ANION GAP SERPL CALC-SCNC: 17 MMOL/L — SIGNIFICANT CHANGE UP (ref 5–17)
BUN SERPL-MCNC: 22 MG/DL — HIGH (ref 8–20)
CALCIUM SERPL-MCNC: 8.7 MG/DL — SIGNIFICANT CHANGE UP (ref 8.6–10.2)
CHLORIDE SERPL-SCNC: 95 MMOL/L — LOW (ref 98–107)
CO2 SERPL-SCNC: 25 MMOL/L — SIGNIFICANT CHANGE UP (ref 22–29)
CREAT SERPL-MCNC: 4.75 MG/DL — HIGH (ref 0.5–1.3)
GLUCOSE BLDC GLUCOMTR-MCNC: 121 MG/DL — HIGH (ref 70–99)
GLUCOSE BLDC GLUCOMTR-MCNC: 157 MG/DL — HIGH (ref 70–99)
GLUCOSE BLDC GLUCOMTR-MCNC: 183 MG/DL — HIGH (ref 70–99)
GLUCOSE BLDC GLUCOMTR-MCNC: 233 MG/DL — HIGH (ref 70–99)
GLUCOSE FLD-MCNC: 158 MG/DL — SIGNIFICANT CHANGE UP
GLUCOSE SERPL-MCNC: 134 MG/DL — HIGH (ref 70–115)
HBV SURFACE AB SER-ACNC: <3 MIU/ML — LOW
HBV SURFACE AG SER-ACNC: SIGNIFICANT CHANGE UP
LDH SERPL L TO P-CCNC: 87 U/L — SIGNIFICANT CHANGE UP
MAGNESIUM SERPL-MCNC: 2.1 MG/DL — SIGNIFICANT CHANGE UP (ref 1.6–2.6)
PHOSPHATE SERPL-MCNC: 4.6 MG/DL — SIGNIFICANT CHANGE UP (ref 2.4–4.7)
POTASSIUM SERPL-MCNC: 4.4 MMOL/L — SIGNIFICANT CHANGE UP (ref 3.5–5.3)
POTASSIUM SERPL-SCNC: 4.4 MMOL/L — SIGNIFICANT CHANGE UP (ref 3.5–5.3)
PROT FLD-MCNC: 4.5 G/DL — SIGNIFICANT CHANGE UP
SODIUM SERPL-SCNC: 137 MMOL/L — SIGNIFICANT CHANGE UP (ref 135–145)

## 2019-02-16 PROCEDURE — 99232 SBSQ HOSP IP/OBS MODERATE 35: CPT

## 2019-02-16 PROCEDURE — ZZZZZ: CPT

## 2019-02-16 RX ADMIN — Medication 325 MILLIGRAM(S): at 11:35

## 2019-02-16 RX ADMIN — Medication 81 MILLIGRAM(S): at 11:35

## 2019-02-16 RX ADMIN — Medication 250 MILLIGRAM(S): at 17:07

## 2019-02-16 RX ADMIN — LIDOCAINE 1 PATCH: 4 CREAM TOPICAL at 05:27

## 2019-02-16 RX ADMIN — Medication 500 MILLIGRAM(S): at 11:35

## 2019-02-16 RX ADMIN — CEFEPIME 100 MILLIGRAM(S): 1 INJECTION, POWDER, FOR SOLUTION INTRAMUSCULAR; INTRAVENOUS at 11:36

## 2019-02-16 RX ADMIN — SACUBITRIL AND VALSARTAN 1 TABLET(S): 24; 26 TABLET, FILM COATED ORAL at 17:07

## 2019-02-16 RX ADMIN — Medication 250 MILLIGRAM(S): at 05:02

## 2019-02-16 RX ADMIN — LIDOCAINE 1 PATCH: 4 CREAM TOPICAL at 18:22

## 2019-02-16 RX ADMIN — CALCITRIOL 0.25 MICROGRAM(S): 0.5 CAPSULE ORAL at 11:35

## 2019-02-16 RX ADMIN — CARVEDILOL PHOSPHATE 12.5 MILLIGRAM(S): 80 CAPSULE, EXTENDED RELEASE ORAL at 17:07

## 2019-02-16 RX ADMIN — PANTOPRAZOLE SODIUM 40 MILLIGRAM(S): 20 TABLET, DELAYED RELEASE ORAL at 05:02

## 2019-02-16 RX ADMIN — SACUBITRIL AND VALSARTAN 1 TABLET(S): 24; 26 TABLET, FILM COATED ORAL at 05:02

## 2019-02-16 RX ADMIN — LIDOCAINE 1 PATCH: 4 CREAM TOPICAL at 11:35

## 2019-02-16 RX ADMIN — POLYETHYLENE GLYCOL 3350 17 GRAM(S): 17 POWDER, FOR SOLUTION ORAL at 11:36

## 2019-02-16 NOTE — CHART NOTE - NSCHARTNOTEFT_GEN_A_CORE
Patient seen and examined at the bedside with resident team. I was physically present for the key portions of the evaluation and management services provided.  history, physical, assessment, and plan per their note dated for today with the necessary amendments/elaborations below:    clinically stable. reports feeling better compared to prior days, less SOB. still requiring supplemental o2 but is at her baseline o2 level given hx of chronic resp failure w/ hypoxia. ct sx eval appreciated, no longer planning for thoracentesis. volume overload improving sp paracentesis yesterday + continued volume control with HD. next HD session tomorrow per renal, cont scheduling per them. ID appreciated, now that pt is no longer pending thoracentesis to help identify fluid in loculated effusion, will need to follow with them regarding abx regimen as they were planning on tailoring results based on cx results, at this time cont cefepime.

## 2019-02-16 NOTE — PROGRESS NOTE ADULT - ASSESSMENT
57 yo female with hx of ESRD on HD MWF, chronic HFrEF (EF 35-40% TTE 02/11/19), NICM (cardiac cath 03/18 non-obstructive CAD), paroxysmal Afib (no AC due to GI bleed), uremic pericarditis s/p pericardiocentesis, right sided pleural effusion s/p pigtail catheter, IDDM, and HTN with recent hospitalization in [facility] 2/2 positive Influenza A complicated with right pleural effusion requiring thoracocentesis and discharged home on supplemental O2 3L/min, currently presenting to ED referred from Pulmonologist after finding of loculated R pleural effusion on CT chest of unclear etiology, associated with dyspnea on exertion and noted peripheral edema, with moderate ascites.   Pt received consecutive HD sessions x 2 to offload fluid 2/11 and 2/12 with improvement in sob.     CT sx/ IR and pulm consulted. As per CT sx need drainage of ascitic fluid prior to draining R loculated effusion for further evaluation of etiology, but given overall fluid overload state will c/w offloading fluid with HD and then consider further intervention with paracentesis/ thoracocentesis.     TTE completed and noted drop in EF from 55% to 35-40% and mobile density noted on aortic valve leaflets, upon review of prior TTE, the latter finding was already present and EF might have been overestimated as per cardiology. 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     Pt has been npo every day x 3 days for possible paracentesis but given IR scheduling has been postponed. Probably to have procedure done today.     Dyspnea on exertion likely multifactorial 2/2 loculated R pleural effusion with concomitant fluid overload noted peripheral edema/ ascites in the setting ESRD/ CHF. Clinical concern for infectious process given recent influenza infection  -  leukopenia is unchanged  - Patient  remains afebrile, blood cultures negative.   - SOB is improved and at her baseline O2 requirement of 3l via NC   - S/p Vanco   - C/w cefepime,  per ID  - c/w Florastor while on ABX  - c/w offloading excess fluid with HD, next session on 02/17 as per Neprho  - CXR shows overall improvement   - Pulmonology f/u noted and appreciated .   - CT surgery: No further intervention from thoracic standpoint      Chronic systolic HFrEF: 35-40%   - Improved peripheral edema, ascites and crackles in lungs with slight improvement.   - Non obstructive CAD by cath in 3/2018   - Previous 11/2018: EF 55-65%  -- > 02/2019 35- 40%  - BNP: > 70,000  - c/w Strict I/o   -Fluid restriction    - daily weight   - c/w entresto po bid, coreg po bid   - c/w offloading fluid with HD, , next session on 02/17 as per Neprho      ESRD on HD (MWF via Left AVF) with normocytic anemia   - s/p x2 consecutive HD sessions for offloading fluid, HD as states above  - SAAG 1.4  - Pending Cytology report  - c/w calcitriol 0.25mg po qd   - Nephro f/u noted and appreciated     DM2  - Last HbA1C: 6.2 on 01/01/2019  - fs low normal  and had episode of hypoglycemia on 2/10/19   - c/w hypoglycemia protocol     HTN  - bp stable   -c/w Coreg 12.5mg BID PO, Amlodipine 2.5 QD PO, Entresto 49/51 PO QD    Chronic constipation  - c/w Miralax qd with holding parameters     Prophylactic measure:  Holding AC in anticipation of IR guided intervention in the am   Florastor while on Abx  Omeprazole 40mg PO    Advanced Directive: Full code     Dispo: Pt at baseline walks independently and has chronic O2 at home. Lives with her son. PT consulted and recommended home w/ assist but since evaluation pt has been independently ambulating to the bathroom and around her room.

## 2019-02-16 NOTE — PROGRESS NOTE ADULT - ASSESSMENT
ESRD: + fluid overload  - HD kiara w additional UF removal    Anemia: MARGI at HD   - cont oral Fe  - trend H/H==> target Hgb>10.0    RO: phos ok  - cont Calcitriol    Will follow

## 2019-02-16 NOTE — PROGRESS NOTE ADULT - SUBJECTIVE AND OBJECTIVE BOX
NEPHROLOGY INTERVAL HPI/OVERNIGHT EVENTS:    MEDICATIONS  (STANDING):  amLODIPine   Tablet 2.5 milliGRAM(s) Oral daily  ascorbic acid 500 milliGRAM(s) Oral daily  aspirin enteric coated 81 milliGRAM(s) Oral daily  calcitriol   Capsule 0.25 MICROGram(s) Oral daily  carvedilol 12.5 milliGRAM(s) Oral every 12 hours  cefepime   IVPB 2000 milliGRAM(s) IV Intermittent daily  dextrose 5%. 1000 milliLiter(s) (50 mL/Hr) IV Continuous <Continuous>  dextrose 50% Injectable 12.5 Gram(s) IV Push once  dextrose 50% Injectable 25 Gram(s) IV Push once  dextrose 50% Injectable 25 Gram(s) IV Push once  ferrous    sulfate 325 milliGRAM(s) Oral daily  influenza   Vaccine 0.5 milliLiter(s) IntraMuscular once  lidocaine   Patch 1 Patch Transdermal daily  pantoprazole    Tablet 40 milliGRAM(s) Oral before breakfast  polyethylene glycol 3350 17 Gram(s) Oral daily  saccharomyces boulardii 250 milliGRAM(s) Oral two times a day  sacubitril 49 mG/valsartan 51 mG 1 Tablet(s) Oral two times a day    MEDICATIONS  (PRN):  dextrose 40% Gel 15 Gram(s) Oral once PRN Blood Glucose LESS THAN 70 milliGRAM(s)/deciliter  glucagon  Injectable 1 milliGRAM(s) IntraMuscular once PRN Glucose LESS THAN 70 milligrams/deciliter      Allergies    No Known Allergies    Intolerances        Vital Signs Last 24 Hrs  T(C): 36.7 (2019 10:40), Max: 37 (2019 06:55)  T(F): 98 (2019 10:40), Max: 98.6 (2019 06:55)  HR: 767 (2019 10:40) (72 - 767)  BP: 158/83 (2019 10:40) (100/58 - 161/83)  BP(mean): --  RR: 18 (2019 10:40) (18 - 18)  SpO2: 96% (2019 10:40) (93% - 96%)  Daily     Daily Weight in k (2019 10:40)    PHYSICAL EXAM:  GENERAL: Mild sob  NECK: Supple, No jvd  NERVOUS SYSTEM:  A/O x3, non focal  CHEST:  Clear with diminished basilar breath sounds (L > R)  HEART:  RRR, No rub  ABDOMEN: Soft, NT/ND BS+  EXTREMITIES:  + LE edema      LABS:        137  |  95<L>  |  22.0<H>  ----------------------------<  134<H>  4.4   |  25.0  |  4.75<H>    Ca    8.7      2019 08:10  Phos  4.6       Mg     2.1               Phosphorus Level, Serum: 4.6 mg/dL ( @ 08:10)  Magnesium, Serum: 2.1 mg/dL ( @ 08:10)          RADIOLOGY & ADDITIONAL TESTS:

## 2019-02-16 NOTE — PROGRESS NOTE ADULT - SUBJECTIVE AND OBJECTIVE BOX
58y Female,   Patient is a 58y old  Female who presents with a chief complaint of sent from pulmonology office (2019 11:33)      HOSPITALIZATION DAY:  [facility] [id number]    Antibiotic:  Cefepine, Vancomycin        INTERVAL/OVERNIGHT EVENTS:    Patient examined at dialysis unit and later at  beside. No acute events overnight.   Patient states her SOB continues to improved especially after paracentesis. Patient had 1 episode of vomiting after paracentesis, states she did not like the dinner (fish), no further episodes and no nausea. Patient continues ambulating.  ROS:  Patient denies chest pain, calf pain, abdominal pain, headaches, fever, chills, dysuria, hematuria, diarrhea, constipation, SOB, palpitations.   Rest of ROS not contributory except for above.      CARDIAC MONITOR  OXYGEN: Nasal Cannula  3L/min     O2 Sat:  96 %    I&O's Summary    2019 07:01  -  2019 16:56  --------------------------------------------------------  IN: 50 mL / OUT: 3000 mL / NET: -2950 mL        Daily Weight in k (2019 10:40)    CAPILLARY BLOOD GLUCOSE    POCT Blood Glucose.: 233 mg/dL (2019 16:47)  POCT Blood Glucose.: 157 mg/dL (2019 11:33)  POCT Blood Glucose.: 121 mg/dL (2019 08:15)  POCT Blood Glucose.: 158 mg/dL (15 Feb 2019 23:00)  POCT Blood Glucose.: 176 mg/dL (15 Feb 2019 18:17)    VITALS     T(C): 36.7 (19 @ 10:40), Max: 37 (19 @ 06:55)  HR: 78 (19 @ 15:52) (72 - 767)  BP: 148/84 (19 @ 15:52) (100/58 - 161/83)  RR: 18 (19 @ 15:52) (18 - 18)  SpO2: 96% (19 @ 10:40) (93% - 96%)      PHYSICAL EXAM  Vital signs reviewed.  GENERAL: Examined at bedside, not in acute distress, alert and oriented x3. Cooperative with exam.   HEENT: moist mucous membranes. Clear conjunctiva   CARDIOVASCULAR: Normal S1 and S2, No murmur, RRR.  RESPIRATORY: B/l bibasilar crackles present predominantly on Left side, RLL diminished breath sounds.   GASTROINTESTINAL: Soft, nontender. Mildly distended +ve fluid shift bs normoactive   EXTREMITIES: No cyanosis, no edema. Left arm AVF covered with clean dressing,   Bruit +  SKIN: warm and dry skin turgor wnl          LABS        137  |  95<L>  |  22.0<H>  ----------------------------<  134<H>  4.4   |  25.0  |  4.75<H>    Ca    8.7      2019 08:10  Phos  4.6       Mg     2.1             Culture - Body Fluid with Gram Stain (collected 15 Feb 2019 16:23)  Source: .Body Fluid  Gram Stain (15 Feb 2019 20:11):    Few White blood cells    No organisms seen  Preliminary Report (2019 15:29):    No growth at 1 day.    Culture in progress    IMAGING    Xray Chest 2 Views PA/Lat (19 @ 14:48) >  IMPRESSION:    Gross cardiomegaly. Perihilar and interstitial and bibasilar LEFT greater   than RIGHT airspace consolidations and effusions as described.    < end of copied text >      DIET: Consistent carbohydrates Renal/ No snaks    MEDICATIONS  (STANDING):  amLODIPine   Tablet 2.5 milliGRAM(s) Oral daily  ascorbic acid 500 milliGRAM(s) Oral daily  aspirin enteric coated 81 milliGRAM(s) Oral daily  calcitriol   Capsule 0.25 MICROGram(s) Oral daily  carvedilol 12.5 milliGRAM(s) Oral every 12 hours  cefepime   IVPB 2000 milliGRAM(s) IV Intermittent daily  dextrose 5%. 1000 milliLiter(s) (50 mL/Hr) IV Continuous <Continuous>  dextrose 50% Injectable 12.5 Gram(s) IV Push once  dextrose 50% Injectable 25 Gram(s) IV Push once  dextrose 50% Injectable 25 Gram(s) IV Push once  ferrous    sulfate 325 milliGRAM(s) Oral daily  influenza   Vaccine 0.5 milliLiter(s) IntraMuscular once  lidocaine   Patch 1 Patch Transdermal daily  pantoprazole    Tablet 40 milliGRAM(s) Oral before breakfast  polyethylene glycol 3350 17 Gram(s) Oral daily  saccharomyces boulardii 250 milliGRAM(s) Oral two times a day  sacubitril 49 mG/valsartan 51 mG 1 Tablet(s) Oral two times a day    MEDICATIONS  (PRN):  dextrose 40% Gel 15 Gram(s) Oral once PRN Blood Glucose LESS THAN 70 milliGRAM(s)/deciliter  glucagon  Injectable 1 milliGRAM(s) IntraMuscular once PRN Glucose LESS THAN 70 milligrams/deciliter

## 2019-02-17 LAB
ALBUMIN SERPL ELPH-MCNC: 3.2 G/DL — LOW (ref 3.3–5.2)
ALP SERPL-CCNC: 101 U/L — SIGNIFICANT CHANGE UP (ref 40–120)
ALT FLD-CCNC: <5 U/L — SIGNIFICANT CHANGE UP
ANION GAP SERPL CALC-SCNC: 13 MMOL/L — SIGNIFICANT CHANGE UP (ref 5–17)
ANISOCYTOSIS BLD QL: SLIGHT — SIGNIFICANT CHANGE UP
AST SERPL-CCNC: 12 U/L — SIGNIFICANT CHANGE UP
BILIRUB SERPL-MCNC: 0.5 MG/DL — SIGNIFICANT CHANGE UP (ref 0.4–2)
BUN SERPL-MCNC: 16 MG/DL — SIGNIFICANT CHANGE UP (ref 8–20)
CALCIUM SERPL-MCNC: 8.8 MG/DL — SIGNIFICANT CHANGE UP (ref 8.6–10.2)
CHLORIDE SERPL-SCNC: 95 MMOL/L — LOW (ref 98–107)
CO2 SERPL-SCNC: 30 MMOL/L — HIGH (ref 22–29)
CREAT SERPL-MCNC: 3.74 MG/DL — HIGH (ref 0.5–1.3)
ELLIPTOCYTES BLD QL SMEAR: SLIGHT — SIGNIFICANT CHANGE UP
EOSINOPHIL NFR BLD AUTO: 2 % — SIGNIFICANT CHANGE UP (ref 0–5)
GLUCOSE BLDC GLUCOMTR-MCNC: 143 MG/DL — HIGH (ref 70–99)
GLUCOSE BLDC GLUCOMTR-MCNC: 148 MG/DL — HIGH (ref 70–99)
GLUCOSE BLDC GLUCOMTR-MCNC: 179 MG/DL — HIGH (ref 70–99)
GLUCOSE BLDC GLUCOMTR-MCNC: 184 MG/DL — HIGH (ref 70–99)
GLUCOSE SERPL-MCNC: 149 MG/DL — HIGH (ref 70–115)
HCT VFR BLD CALC: 37.7 % — SIGNIFICANT CHANGE UP (ref 37–47)
HGB BLD-MCNC: 11.3 G/DL — LOW (ref 12–16)
LYMPHOCYTES # BLD AUTO: 16 % — LOW (ref 20–55)
MACROCYTES BLD QL: SLIGHT — SIGNIFICANT CHANGE UP
MAGNESIUM SERPL-MCNC: 2.1 MG/DL — SIGNIFICANT CHANGE UP (ref 1.6–2.6)
MCHC RBC-ENTMCNC: 27.2 PG — SIGNIFICANT CHANGE UP (ref 27–31)
MCHC RBC-ENTMCNC: 30 G/DL — LOW (ref 32–36)
MCV RBC AUTO: 90.8 FL — SIGNIFICANT CHANGE UP (ref 81–99)
MONOCYTES NFR BLD AUTO: 10 % — SIGNIFICANT CHANGE UP (ref 3–10)
NEUTROPHILS NFR BLD AUTO: 71 % — SIGNIFICANT CHANGE UP (ref 37–73)
PHOSPHATE SERPL-MCNC: 4.3 MG/DL — SIGNIFICANT CHANGE UP (ref 2.4–4.7)
PLAT MORPH BLD: NORMAL — SIGNIFICANT CHANGE UP
PLATELET # BLD AUTO: 230 K/UL — SIGNIFICANT CHANGE UP (ref 150–400)
POIKILOCYTOSIS BLD QL AUTO: SLIGHT — SIGNIFICANT CHANGE UP
POLYCHROMASIA BLD QL SMEAR: SLIGHT — SIGNIFICANT CHANGE UP
POTASSIUM SERPL-MCNC: 4.1 MMOL/L — SIGNIFICANT CHANGE UP (ref 3.5–5.3)
POTASSIUM SERPL-SCNC: 4.1 MMOL/L — SIGNIFICANT CHANGE UP (ref 3.5–5.3)
PROT SERPL-MCNC: 7.3 G/DL — SIGNIFICANT CHANGE UP (ref 6.6–8.7)
RBC # BLD: 4.15 M/UL — LOW (ref 4.4–5.2)
RBC # FLD: 15.8 % — HIGH (ref 11–15.6)
RBC BLD AUTO: ABNORMAL
SODIUM SERPL-SCNC: 138 MMOL/L — SIGNIFICANT CHANGE UP (ref 135–145)
VARIANT LYMPHS # BLD: 1 % — SIGNIFICANT CHANGE UP (ref 0–6)
WBC # BLD: 3.4 K/UL — LOW (ref 4.8–10.8)
WBC # FLD AUTO: 3.4 K/UL — LOW (ref 4.8–10.8)

## 2019-02-17 PROCEDURE — 99232 SBSQ HOSP IP/OBS MODERATE 35: CPT | Mod: GC

## 2019-02-17 RX ORDER — HEPARIN SODIUM 5000 [USP'U]/ML
5000 INJECTION INTRAVENOUS; SUBCUTANEOUS EVERY 12 HOURS
Qty: 0 | Refills: 0 | Status: DISCONTINUED | OUTPATIENT
Start: 2019-02-17 | End: 2019-02-19

## 2019-02-17 RX ADMIN — Medication 500 MILLIGRAM(S): at 11:37

## 2019-02-17 RX ADMIN — CARVEDILOL PHOSPHATE 12.5 MILLIGRAM(S): 80 CAPSULE, EXTENDED RELEASE ORAL at 05:26

## 2019-02-17 RX ADMIN — Medication 250 MILLIGRAM(S): at 17:42

## 2019-02-17 RX ADMIN — PANTOPRAZOLE SODIUM 40 MILLIGRAM(S): 20 TABLET, DELAYED RELEASE ORAL at 05:25

## 2019-02-17 RX ADMIN — SACUBITRIL AND VALSARTAN 1 TABLET(S): 24; 26 TABLET, FILM COATED ORAL at 17:42

## 2019-02-17 RX ADMIN — SACUBITRIL AND VALSARTAN 1 TABLET(S): 24; 26 TABLET, FILM COATED ORAL at 05:25

## 2019-02-17 RX ADMIN — LIDOCAINE 1 PATCH: 4 CREAM TOPICAL at 00:00

## 2019-02-17 RX ADMIN — CARVEDILOL PHOSPHATE 12.5 MILLIGRAM(S): 80 CAPSULE, EXTENDED RELEASE ORAL at 17:42

## 2019-02-17 RX ADMIN — AMLODIPINE BESYLATE 2.5 MILLIGRAM(S): 2.5 TABLET ORAL at 05:26

## 2019-02-17 RX ADMIN — HEPARIN SODIUM 5000 UNIT(S): 5000 INJECTION INTRAVENOUS; SUBCUTANEOUS at 17:42

## 2019-02-17 RX ADMIN — Medication 250 MILLIGRAM(S): at 05:25

## 2019-02-17 RX ADMIN — CALCITRIOL 0.25 MICROGRAM(S): 0.5 CAPSULE ORAL at 11:37

## 2019-02-17 RX ADMIN — CEFEPIME 100 MILLIGRAM(S): 1 INJECTION, POWDER, FOR SOLUTION INTRAMUSCULAR; INTRAVENOUS at 11:37

## 2019-02-17 RX ADMIN — POLYETHYLENE GLYCOL 3350 17 GRAM(S): 17 POWDER, FOR SOLUTION ORAL at 11:37

## 2019-02-17 RX ADMIN — LIDOCAINE 1 PATCH: 4 CREAM TOPICAL at 19:00

## 2019-02-17 RX ADMIN — LIDOCAINE 1 PATCH: 4 CREAM TOPICAL at 11:37

## 2019-02-17 RX ADMIN — Medication 325 MILLIGRAM(S): at 11:37

## 2019-02-17 RX ADMIN — Medication 81 MILLIGRAM(S): at 11:37

## 2019-02-17 NOTE — PROGRESS NOTE ADULT - ASSESSMENT
59 yo female with hx of ESRD on HD MWF, chronic HFrEF (EF 35-40% TTE 02/11/19), NICM (cardiac cath 03/18 non-obstructive CAD), paroxysmal Afib (no AC due to GI bleed), uremic pericarditis s/p pericardiocentesis, right sided pleural effusion s/p pigtail catheter, IDDM, and HTN with recent hospitalization in [facility] 2/2 positive Influenza A complicated with right pleural effusion requiring thoracocentesis and discharged home on supplemental O2 3L/min, currently presenting to ED referred from Pulmonologist after finding of loculated R pleural effusion on CT chest of unclear etiology, associated with dyspnea on exertion and noted peripheral edema, with moderate ascites.   Pt received consecutive HD sessions x 2 to offload fluid 2/11 and 2/12 with improvement in sob.     CT sx/ IR and pulm consulted. As per CT sx need drainage of ascitic fluid prior to draining R loculated effusion for further evaluation of etiology, but given overall fluid overload state, offloading of fluid with HD was continued and paracentesis was performed on 02/15/19 draining 3550mL of fluid. Both interventions resulting in return of respiratory status and O2 requirements to patient's baseline.      TTE completed and noted drop in EF from 55% to 35-40% and mobile density noted on aortic valve leaflets, upon review of prior TTE, the latter finding was already present and EF might have been overestimated as per cardiology. 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     Dyspnea on exertion likely multifactorial 2/2 loculated R pleural effusion with concomitant fluid overload noted peripheral edema/ ascites in the setting ESRD/ CHF. Clinical concern for infectious process given recent influenza infection  -  leukopenia is unchanged  - Patient  remains afebrile, blood cultures negative.   - SOB is improved and at her baseline O2 requirement of 3l via NC   - S/p Vanco   - C/w cefepime, per ID. Will need to clarify antibiotic therapy since initial plan was to steer therapy based on culture of pleural fluid but as per CT surgery, thoracentesis is not indicated.  - c/w Florastor while on ABX  - c/w offloading excess fluid with HD, next session deferred to 02/18 as per Nephro  - CXR shows overall improvement.  - Pulmonology f/u noted and appreciated .   - CT surgery: No further intervention from thoracic standpoint    Chronic systolic HFrEF: 35-40%   - Improved peripheral edema, ascites and crackles in lungs with slight improvement.   - Non obstructive CAD by cath in 3/2018   - Previous 11/2018: EF 55-65%  -- > 02/2019 35- 40%  - BNP: > 70,000  - c/w Strict I/o   -Fluid restriction    - daily weight   - c/w entresto po bid, coreg po bid   - c/w offloading fluid with HD, , next session deferred to 02/18 as per Nephro    ESRD on HD (MWF via Left AVF) with normocytic anemia   - s/p x2 consecutive HD sessions for offloading fluid, HD as stated above  - SAAG 1.4  - Pending Cytology report  - c/w calcitriol 0.25mg po qd   - Nephro f/u noted and appreciated     DM2  - Last HbA1C: 6.2 on 01/01/2019  - fs low normal  and had episode of hypoglycemia on 2/10/19   - c/w hypoglycemia protocol     HTN  - bp stable   -c/w Coreg 12.5mg BID PO, Amlodipine 2.5 QD PO, Entresto 49/51 PO QD    Chronic constipation  - c/w Miralax qd with holding parameters     Prophylactic measure:  Holding AC in anticipation of IR guided intervention in the am   Florastor while on Abx  Omeprazole 40mg PO    Advanced Directive: Full code     Dispo: Pt at baseline walks independently and has chronic O2 at home. Lives with her son. PT consulted and recommended home w/ assist but since evaluation pt has been independently ambulating to the bathroom and around her room.

## 2019-02-17 NOTE — CHART NOTE - NSCHARTNOTEFT_GEN_A_CORE
Patient seen and examined at the bedside with resident team. I was physically present for the key portions of the evaluation and management services provided.  history, physical, assessment, and plan per their note dated for today with the necessary amendments/elaborations below:    clinically stable. initially planned for HD today, will be deferred to tomorrow per renal w/ additional fluid removal. pt w/ cirrhosis on CT, no evidence of liver failure. less likely related to hepatic etiology. check us doppler of the liver to r/o budd chiari. has had significant improvement in volume status sp paracentesis + continued HD. suspect that symptoms may have all been sec to acute on chronic combined HF exac in setting of SAAG > 1.1 (is 1.4).     regarding loculated pleural effusion, still on cefepime per ID. last cxr with signs of improvement. will follow regarding end date for abx. repeat procal in am.     regarding dm2 on long term insulin, complicated by ep of hypoglycemia inpatient, 55, at which time iss was discontinued. cont holding long + short acting insulin, cont fs achs. if BG remains systematically > 180 will resume insulin inpatient. resume home regimen on dc.     dvt ppx, start sqh    dispo: dc home once pt volume status fully optimized. ? if abx will be continued on dc, will follow up w/ id

## 2019-02-17 NOTE — PROGRESS NOTE ADULT - SUBJECTIVE AND OBJECTIVE BOX
NEPHROLOGY INTERVAL HPI/OVERNIGHT EVENTS:    Examined earlier    MEDICATIONS  (STANDING):  amLODIPine   Tablet 2.5 milliGRAM(s) Oral daily  ascorbic acid 500 milliGRAM(s) Oral daily  aspirin enteric coated 81 milliGRAM(s) Oral daily  calcitriol   Capsule 0.25 MICROGram(s) Oral daily  carvedilol 12.5 milliGRAM(s) Oral every 12 hours  cefepime   IVPB 2000 milliGRAM(s) IV Intermittent daily  dextrose 5%. 1000 milliLiter(s) (50 mL/Hr) IV Continuous <Continuous>  dextrose 50% Injectable 12.5 Gram(s) IV Push once  dextrose 50% Injectable 25 Gram(s) IV Push once  dextrose 50% Injectable 25 Gram(s) IV Push once  ferrous    sulfate 325 milliGRAM(s) Oral daily  lidocaine   Patch 1 Patch Transdermal daily  pantoprazole    Tablet 40 milliGRAM(s) Oral before breakfast  polyethylene glycol 3350 17 Gram(s) Oral daily  saccharomyces boulardii 250 milliGRAM(s) Oral two times a day  sacubitril 49 mG/valsartan 51 mG 1 Tablet(s) Oral two times a day    MEDICATIONS  (PRN):  dextrose 40% Gel 15 Gram(s) Oral once PRN Blood Glucose LESS THAN 70 milliGRAM(s)/deciliter  glucagon  Injectable 1 milliGRAM(s) IntraMuscular once PRN Glucose LESS THAN 70 milligrams/deciliter      Allergies    No Known Allergies    Intolerances        Vital Signs Last 24 Hrs  T(C): 36.5 (17 Feb 2019 07:30), Max: 36.8 (16 Feb 2019 23:28)  T(F): 97.7 (17 Feb 2019 07:30), Max: 98.2 (16 Feb 2019 23:28)  HR: 73 (17 Feb 2019 07:30) (71 - 78)  BP: 149/82 (17 Feb 2019 07:30) (136/62 - 149/82)  BP(mean): --  RR: 18 (17 Feb 2019 07:30) (18 - 18)  SpO2: 92% (17 Feb 2019 07:30) (92% - 92%)  Daily     Daily     PHYSICAL EXAM:  GENERAL: Mild sob  NECK: Supple, No jvd  NERVOUS SYSTEM:  A/O x3, non focal  CHEST:  Clear with diminished basilar breath sounds (L > R)  HEART:  RRR, No rub  ABDOMEN: Soft, NT/ND BS+  EXTREMITIES:  + LE edema    LABS:                        11.3   3.4   )-----------( 230      ( 17 Feb 2019 07:06 )             37.7     02-17    138  |  95<L>  |  16.0  ----------------------------<  149<H>  4.1   |  30.0<H>  |  3.74<H>    Ca    8.8      17 Feb 2019 07:06  Phos  4.3     02-17  Mg     2.1     02-17    TPro  7.3  /  Alb  3.2<L>  /  TBili  0.5  /  DBili  x   /  AST  12  /  ALT  <5  /  AlkPhos  101  02-17        Magnesium, Serum: 2.1 mg/dL (02-17 @ 07:06)  Phosphorus Level, Serum: 4.3 mg/dL (02-17 @ 07:06)          RADIOLOGY & ADDITIONAL TESTS:

## 2019-02-17 NOTE — PROGRESS NOTE ADULT - SUBJECTIVE AND OBJECTIVE BOX
Patient is a 58y old  Female who presents with a chief complaint of sent from pulmonology office (17 Feb 2019 10:55)    HOSPITALIZATION DAY: 8  [facility] 2GUL 2326 01    Antibiotic:  Cefepine, Vancomycin        INTERVAL/OVERNIGHT EVENTS:  Patient examined at  beside. No acute events overnight.   Patient states her SOB has much improved and is now at baseline.  Patient continues to ambulate.  ROS:  Patient denies chest pain, calf pain, abdominal pain, headaches, fever, chills, dysuria, hematuria, diarrhea, constipation, SOB, palpitations. Rest of ROS not contributory except for above.    I&O's Summary    16 Feb 2019 07:01  -  17 Feb 2019 07:00  --------------------------------------------------------  IN: 50 mL / OUT: 3000 mL / NET: -2950 mL    CAPILLARY BLOOD GLUCOSE  POCT Blood Glucose.: 179 mg/dL (17 Feb 2019 11:35)  POCT Blood Glucose.: 143 mg/dL (17 Feb 2019 07:53)  POCT Blood Glucose.: 183 mg/dL (16 Feb 2019 22:42)    Vital Signs Last 24 Hrs  T(C): 36.5 (17 Feb 2019 07:30), Max: 36.8 (16 Feb 2019 23:28)  T(F): 97.7 (17 Feb 2019 07:30), Max: 98.2 (16 Feb 2019 23:28)  HR: 73 (17 Feb 2019 07:30) (71 - 78)  BP: 149/82 (17 Feb 2019 07:30) (136/62 - 149/82)  RR: 18 (17 Feb 2019 07:30) (18 - 18)  SpO2: 92% (17 Feb 2019 07:30) (92% - 92%)    PHYSICAL EXAM  GENERAL: Patient is a middle-aged Montenegrin-speaking  female found resting comfortably in bed. Pleasant and cooperative with family at bedside. On supplemental O2 by NC.   HEENT: Normocephalic, atraumatic. Moist mucous membranes. Clear conjunctiva   CARDIOVASCULAR: Normal S1 and S2, No murmur, Regular rate and rhythm.  RESPIRATORY: B/l bibasilar crackles present predominantly on Left side, RLL diminished breath sounds.   GASTROINTESTINAL: Soft, nontender. Mildly distended +ve fluid shift bs normoactive   EXTREMITIES: No cyanosis, no edema. Left arm AVF covered with clean dressing,   Bruit +  SKIN: warm and dry skin turgor wnl                            11.3   3.4   )-----------( 230      ( 17 Feb 2019 07:06 )             37.7     02-17    138  |  95<L>  |  16.0  ----------------------------<  149<H>  4.1   |  30.0<H>  |  3.74<H>    Ca    8.8      17 Feb 2019 07:06  Phos  4.3     02-17  Mg     2.1     02-17    TPro  7.3  /  Alb  3.2<L>  /  TBili  0.5  /  DBili  x   /  AST  12  /  ALT  <5  /  AlkPhos  101  02-17    MEDICATIONS  (STANDING):  amLODIPine   Tablet 2.5 milliGRAM(s) Oral daily  ascorbic acid 500 milliGRAM(s) Oral daily  aspirin enteric coated 81 milliGRAM(s) Oral daily  calcitriol   Capsule 0.25 MICROGram(s) Oral daily  carvedilol 12.5 milliGRAM(s) Oral every 12 hours  cefepime   IVPB 2000 milliGRAM(s) IV Intermittent daily  dextrose 5%. 1000 milliLiter(s) (50 mL/Hr) IV Continuous <Continuous>  dextrose 50% Injectable 12.5 Gram(s) IV Push once  dextrose 50% Injectable 25 Gram(s) IV Push once  dextrose 50% Injectable 25 Gram(s) IV Push once  ferrous    sulfate 325 milliGRAM(s) Oral daily  heparin  Injectable 5000 Unit(s) SubCutaneous every 12 hours  lidocaine   Patch 1 Patch Transdermal daily  pantoprazole    Tablet 40 milliGRAM(s) Oral before breakfast  polyethylene glycol 3350 17 Gram(s) Oral daily  saccharomyces boulardii 250 milliGRAM(s) Oral two times a day  sacubitril 49 mG/valsartan 51 mG 1 Tablet(s) Oral two times a day    MEDICATIONS  (PRN):  dextrose 40% Gel 15 Gram(s) Oral once PRN Blood Glucose LESS THAN 70 milliGRAM(s)/deciliter  glucagon  Injectable 1 milliGRAM(s) IntraMuscular once PRN Glucose LESS THAN 70 milligrams/deciliter

## 2019-02-17 NOTE — PROGRESS NOTE ADULT - ASSESSMENT
ESRD: + fluid overload  - HD kiara- Mon w additional UF removal  Usual schedule is TTS    Anemia: MARGI at HD   - cont oral Fe  - trend H/H==> target Hgb>10.0    RO: phos ok  - cont Calcitriol    Will follow

## 2019-02-18 LAB
ANION GAP SERPL CALC-SCNC: 13 MMOL/L — SIGNIFICANT CHANGE UP (ref 5–17)
BUN SERPL-MCNC: 24 MG/DL — HIGH (ref 8–20)
CALCIUM SERPL-MCNC: 8.8 MG/DL — SIGNIFICANT CHANGE UP (ref 8.6–10.2)
CHLORIDE SERPL-SCNC: 94 MMOL/L — LOW (ref 98–107)
CO2 SERPL-SCNC: 27 MMOL/L — SIGNIFICANT CHANGE UP (ref 22–29)
CREAT SERPL-MCNC: 4.44 MG/DL — HIGH (ref 0.5–1.3)
CULTURE RESULTS: SIGNIFICANT CHANGE UP
CULTURE RESULTS: SIGNIFICANT CHANGE UP
GLUCOSE BLDC GLUCOMTR-MCNC: 145 MG/DL — HIGH (ref 70–99)
GLUCOSE BLDC GLUCOMTR-MCNC: 165 MG/DL — HIGH (ref 70–99)
GLUCOSE BLDC GLUCOMTR-MCNC: 185 MG/DL — HIGH (ref 70–99)
GLUCOSE SERPL-MCNC: 151 MG/DL — HIGH (ref 70–115)
MAGNESIUM SERPL-MCNC: 2.2 MG/DL — SIGNIFICANT CHANGE UP (ref 1.6–2.6)
PHOSPHATE SERPL-MCNC: 5.2 MG/DL — HIGH (ref 2.4–4.7)
POTASSIUM SERPL-MCNC: 4.8 MMOL/L — SIGNIFICANT CHANGE UP (ref 3.5–5.3)
POTASSIUM SERPL-SCNC: 4.8 MMOL/L — SIGNIFICANT CHANGE UP (ref 3.5–5.3)
PROCALCITONIN SERPL-MCNC: 0.16 NG/ML — HIGH (ref 0.02–0.1)
SODIUM SERPL-SCNC: 134 MMOL/L — LOW (ref 135–145)
SPECIMEN SOURCE: SIGNIFICANT CHANGE UP
SPECIMEN SOURCE: SIGNIFICANT CHANGE UP

## 2019-02-18 PROCEDURE — 93975 VASCULAR STUDY: CPT | Mod: 26

## 2019-02-18 PROCEDURE — 99232 SBSQ HOSP IP/OBS MODERATE 35: CPT

## 2019-02-18 PROCEDURE — 99232 SBSQ HOSP IP/OBS MODERATE 35: CPT | Mod: GC

## 2019-02-18 RX ADMIN — LIDOCAINE 1 PATCH: 4 CREAM TOPICAL at 00:00

## 2019-02-18 RX ADMIN — CEFEPIME 100 MILLIGRAM(S): 1 INJECTION, POWDER, FOR SOLUTION INTRAMUSCULAR; INTRAVENOUS at 12:07

## 2019-02-18 RX ADMIN — LIDOCAINE 1 PATCH: 4 CREAM TOPICAL at 23:40

## 2019-02-18 RX ADMIN — AMLODIPINE BESYLATE 2.5 MILLIGRAM(S): 2.5 TABLET ORAL at 06:14

## 2019-02-18 RX ADMIN — Medication 81 MILLIGRAM(S): at 12:06

## 2019-02-18 RX ADMIN — CALCITRIOL 0.25 MICROGRAM(S): 0.5 CAPSULE ORAL at 12:06

## 2019-02-18 RX ADMIN — HEPARIN SODIUM 5000 UNIT(S): 5000 INJECTION INTRAVENOUS; SUBCUTANEOUS at 17:08

## 2019-02-18 RX ADMIN — PANTOPRAZOLE SODIUM 40 MILLIGRAM(S): 20 TABLET, DELAYED RELEASE ORAL at 06:14

## 2019-02-18 RX ADMIN — Medication 250 MILLIGRAM(S): at 06:14

## 2019-02-18 RX ADMIN — Medication 325 MILLIGRAM(S): at 12:06

## 2019-02-18 RX ADMIN — POLYETHYLENE GLYCOL 3350 17 GRAM(S): 17 POWDER, FOR SOLUTION ORAL at 12:06

## 2019-02-18 RX ADMIN — Medication 500 MILLIGRAM(S): at 12:06

## 2019-02-18 RX ADMIN — LIDOCAINE 1 PATCH: 4 CREAM TOPICAL at 12:06

## 2019-02-18 RX ADMIN — CARVEDILOL PHOSPHATE 12.5 MILLIGRAM(S): 80 CAPSULE, EXTENDED RELEASE ORAL at 06:14

## 2019-02-18 RX ADMIN — LIDOCAINE 1 PATCH: 4 CREAM TOPICAL at 18:14

## 2019-02-18 RX ADMIN — Medication 250 MILLIGRAM(S): at 17:08

## 2019-02-18 RX ADMIN — HEPARIN SODIUM 5000 UNIT(S): 5000 INJECTION INTRAVENOUS; SUBCUTANEOUS at 06:14

## 2019-02-18 RX ADMIN — SACUBITRIL AND VALSARTAN 1 TABLET(S): 24; 26 TABLET, FILM COATED ORAL at 06:14

## 2019-02-18 RX ADMIN — SACUBITRIL AND VALSARTAN 1 TABLET(S): 24; 26 TABLET, FILM COATED ORAL at 17:08

## 2019-02-18 RX ADMIN — CARVEDILOL PHOSPHATE 12.5 MILLIGRAM(S): 80 CAPSULE, EXTENDED RELEASE ORAL at 17:08

## 2019-02-18 NOTE — PROGRESS NOTE ADULT - SUBJECTIVE AND OBJECTIVE BOX
[facility]  INFECTIOUS DISEASES AND INTERNAL MEDICINE at [place]  =======================================================  Jose Carlos Servin MD  Diplomates American Board of Internal Medicine and Infectious Diseases  =======================================================    MRN-[id number]  JAROCHO MORALES     Follow up: loculated pleural effusion    NAD no respiratory distress. No cough. t   Afebrile.     PAST MEDICAL & SURGICAL HISTORY:  Coronary artery disease, angina presence unspecified, unspecified vessel or lesion type, unspecified whether native or transplanted heart  Paroxysmal atrial fibrillation  Anemia due to chronic kidney disease, unspecified CKD stage  Chronic systolic congestive heart failure  Pleural effusion  Pericardial effusion  End stage renal disease on dialysis  HLD (hyperlipidemia)  HTN (hypertension)  DM (diabetes mellitus)  A-V fistula  Encounter for dialysis catheter care    Social Hx: no smoking or other toxic habits    FAMILY HISTORY:  Family history of kidney disease in brother (Sibling)    Allergies  No Known Allergies    Antibiotics:  cefepime   IVPB 2000 milliGRAM(s) IV Intermittent daily  vancomycin  IVPB 1000 milliGRAM(s) IV Intermittent every 48 hours     REVIEW OF SYSTEMS:  CONSTITUTIONAL:  No Fever or chills  HEENT:  No diplopia or blurred vision.  No sore throat or runny nose.  CARDIOVASCULAR:  No chest pain or SOB.  RESPIRATORY:  +Cough, +shortness of breath, PND or orthopnea.  GASTROINTESTINAL:  No nausea, vomiting or diarrhea.  GENITOURINARY:  No dysuria, frequency or urgency. No Blood in urine  MUSCULOSKELETAL:  no joint aches, no muscle pain  SKIN:  No change in skin, hair or nails.  NEUROLOGIC:  No paresthesias, fasciculations, seizures or weakness.  PSYCHIATRIC:  No disorder of thought or mood.  ENDOCRINE:  No heat or cold intolerance, polyuria or polydipsia.  HEMATOLOGICAL:  No easy bruising or bleeding.     Physical Exam:   Vital Signs Last 24 Hrs  T(C): 36.7 (18 Feb 2019 07:13), Max: 36.9 (17 Feb 2019 16:11)  T(F): 98.1 (18 Feb 2019 07:13), Max: 98.4 (17 Feb 2019 16:11)  HR: 73 (18 Feb 2019 07:13) (69 - 73)  BP: 141/78 (18 Feb 2019 07:13) (140/77 - 159/88)  BP(mean): --  RR: 18 (18 Feb 2019 07:13) (18 - 18)  SpO2: 95% (18 Feb 2019 07:13) (95% - 95%)    GEN: NAD on o2  HEENT: normocephalic and atraumatic. EOMI. PERRL.    NECK: Supple.  No lymphadenopathy   LUNGS: Decreased BS in R lung in general, R lung with rales all over and left lung only on base.   HEART: Regular rate and rhythm   ABDOMEN: Soft, nontender, and nondistended.  Positive bowel sounds.    : No CVA tenderness  EXTREMITIES: Without any cyanosis, clubbing, rash, lesions or edema.  NEUROLOGIC: grossly intact.  PSYCHIATRIC: Appropriate affect .  SKIN: No ulceration or induration present.    Labs:                         11.3   3.4   )-----------( 230      ( 17 Feb 2019 07:06 )             37.7   02-18    134<L>  |  94<L>  |  24.0<H>  ----------------------------<  151<H>  4.8   |  27.0  |  4.44<H>    Ca    8.8      18 Feb 2019 08:05  Phos  5.2     02-18  Mg     2.2     02-18    TPro  7.3  /  Alb  3.2<L>  /  TBili  0.5  /  DBili  x   /  AST  12  /  ALT  <5  /  AlkPhos  101  02-17      02-09 @ 13:04 .Blood     No growth at 5 days.      All imaging and other data have been reviewed.    Impression: Chest CT 2/9  -Right perihilar consolidation and right interlobular septal thickening;   differential diagnosis includes malignancy and pneumonia.  -Small-moderate right pleural effusion is loculated, differential   diagnosis includes malignancy, parapneumonic effusion, or empyema.  -Severe global cardiomegaly.  -Small-moderate ascites, unclear etiology.

## 2019-02-18 NOTE — PROGRESS NOTE ADULT - SUBJECTIVE AND OBJECTIVE BOX
58y Female,   Patient is a 58y old  Female who presents with a chief complaint of sent from pulmonology office (17 Feb 2019 13:51)      HOSPITALIZATION DAY:  [facility] 2GUL 2326 01    Antibiotic:                                  Started on:       INTERVAL/OVERNIGHT EVENTS:    Patient examined at beside. No acute events over night. As per nurse, ___.   As per patient, states felling  better/ worse/ same .   Patient is tolerating _______ diet w/o nausea/vomiting/diarrhea/abdominal pain. Voiding actively and las BM  on __________. Patient is ambulating / OOB to chair _______________.   Patient denies chest pain, calf pain, abdominal pain, headaches, fever, chills, dysuria, hematuria, diarrhea, constipation, SOB, palpitations.   Rest of ROS not contributory except for above.      CARDIAC MONITOR  OXYGEN:   Mask / Cannula    L/min     O2 Sat:   %    I&O's Summary    16 Feb 2019 07:01  -  17 Feb 2019 07:00  --------------------------------------------------------  IN: 50 mL / OUT: 3000 mL / NET: -2950 mL    17 Feb 2019 07:01  -  18 Feb 2019 06:42  --------------------------------------------------------  IN: 50 mL / OUT: 0 mL / NET: 50 mL        Daily     Daily     CAPILLARY BLOOD GLUCOSE      POCT Blood Glucose.: 184 mg/dL (17 Feb 2019 21:52)  POCT Blood Glucose.: 148 mg/dL (17 Feb 2019 16:50)  POCT Blood Glucose.: 179 mg/dL (17 Feb 2019 11:35)  POCT Blood Glucose.: 143 mg/dL (17 Feb 2019 07:53)      T(C): 36.4 (02-17-19 @ 23:16), Max: 36.9 (02-17-19 @ 16:11)  HR: 69 (02-18-19 @ 05:21) (69 - 73)  BP: 143/91 (02-18-19 @ 05:21) (140/77 - 159/88)  RR: 18 (02-17-19 @ 23:16) (18 - 18)  SpO2: 92% (02-17-19 @ 07:30) (92% - 92%)  PHYSICAL EXAM: Vital signs reviewed.  GENERAL: Appears well developed, well nourished alert and cooperative.  HEENT: Head; normocephalic, atraumatic, PERRLA, EOMI  NECK: Supple, no JVD or carotid bruit or thyromegaly.  CARDIOVASCULAR: Normal S1 and S2, No murmur, RRR. No edema  RESPIRATORY: No rales, rhonchi or wheeze. Normal breath sounds bilaterally.  GASTROINTESTINAL: Soft, nontender without mass or organomegaly. Nl BS  EXTREMITIES: No clubbing, cyanosis or edema. No calf tenderness. Distal pulses wnl.   MUSCULOSKELETAL: 5/5 muscle strength in UE and LE.   SKIN: warm and dry with normal turgor.  NEURO: Alert/oriented x 3/normal motor exam. CN 2-12 intact. No pathologic reflexes.    PSYCH: normal affect.    LABS                          11.3   3.4   )-----------( 230      ( 17 Feb 2019 07:06 )             37.7         02-17    138  |  95<L>  |  16.0  ----------------------------<  149<H>  4.1   |  30.0<H>  |  3.74<H>    Ca    8.8      17 Feb 2019 07:06  Phos  4.3     02-17  Mg     2.1     02-17    TPro  7.3  /  Alb  3.2<L>  /  TBili  0.5  /  DBili  x   /  AST  12  /  ALT  <5  /  AlkPhos  101  02-17            LIVER FUNCTIONS - ( 17 Feb 2019 07:06 )  Alb: 3.2 g/dL / Pro: 7.3 g/dL / ALK PHOS: 101 U/L / ALT: <5 U/L / AST: 12 U/L / GGT: x                   Culture - Body Fluid with Gram Stain (collected 15 Feb 2019 16:23)  Source: .Body Fluid  Gram Stain (15 Feb 2019 20:11):    Few White blood cells    No organisms seen  Preliminary Report (17 Feb 2019 15:50):    No growth at 2 days.    Culture in progress        IMAGING      DIET:    MEDICATIONS  (STANDING):  amLODIPine   Tablet 2.5 milliGRAM(s) Oral daily  ascorbic acid 500 milliGRAM(s) Oral daily  aspirin enteric coated 81 milliGRAM(s) Oral daily  calcitriol   Capsule 0.25 MICROGram(s) Oral daily  carvedilol 12.5 milliGRAM(s) Oral every 12 hours  cefepime   IVPB 2000 milliGRAM(s) IV Intermittent daily  dextrose 5%. 1000 milliLiter(s) (50 mL/Hr) IV Continuous <Continuous>  dextrose 50% Injectable 12.5 Gram(s) IV Push once  dextrose 50% Injectable 25 Gram(s) IV Push once  dextrose 50% Injectable 25 Gram(s) IV Push once  ferrous    sulfate 325 milliGRAM(s) Oral daily  heparin  Injectable 5000 Unit(s) SubCutaneous every 12 hours  lidocaine   Patch 1 Patch Transdermal daily  pantoprazole    Tablet 40 milliGRAM(s) Oral before breakfast  polyethylene glycol 3350 17 Gram(s) Oral daily  saccharomyces boulardii 250 milliGRAM(s) Oral two times a day  sacubitril 49 mG/valsartan 51 mG 1 Tablet(s) Oral two times a day    MEDICATIONS  (PRN):  dextrose 40% Gel 15 Gram(s) Oral once PRN Blood Glucose LESS THAN 70 milliGRAM(s)/deciliter  glucagon  Injectable 1 milliGRAM(s) IntraMuscular once PRN Glucose LESS THAN 70 milligrams/deciliter 58y Female,   Patient is a 58y old  Female who presents with a chief complaint of sent from pulmonology office (17 Feb 2019 13:51)      HOSPITALIZATION DAY: 9  [facility] 2GUL 2326 01    Antibiotic:  Cefepime    INTERVAL/OVERNIGHT EVENTS:      Patient denies chest pain, calf pain, abdominal pain, headaches, fever, chills, dysuria, hematuria, diarrhea, constipation, SOB, palpitations.   Rest of ROS not contributory except for above.      CARDIAC MONITOR  OXYGEN:   Nasal Cannula   3 L/min     O2 Sat:   %  w/o O2 saturation 95% HR 75    I&O's Summary    16 Feb 2019 07:01  -  17 Feb 2019 07:00  --------------------------------------------------------  IN: 50 mL / OUT: 3000 mL / NET: -2950 mL    17 Feb 2019 07:01  -  18 Feb 2019 06:42  --------------------------------------------------------  IN: 50 mL / OUT: 0 mL / NET: 50 mL        CAPILLARY BLOOD GLUCOSE  POCT Blood Glucose.: 184 mg/dL (17 Feb 2019 21:52)  POCT Blood Glucose.: 148 mg/dL (17 Feb 2019 16:50)  POCT Blood Glucose.: 179 mg/dL (17 Feb 2019 11:35)  POCT Blood Glucose.: 143 mg/dL (17 Feb 2019 07:53)      VITALS    T(C): 36.4 (02-17-19 @ 23:16), Max: 36.9 (02-17-19 @ 16:11)  HR: 69 (02-18-19 @ 05:21) (69 - 73)  BP: 143/91 (02-18-19 @ 05:21) (140/77 - 159/88)  RR: 18 (02-17-19 @ 23:16) (18 - 18)  SpO2: 92% (02-17-19 @ 07:30) (92% - 92%)      PHYSICAL EXAM:   Vital signs reviewed.  GENERAL: Patient is resting comfortably in bed. Pleasant and cooperative with family at bedside. Not wearing NC, sat 95%   HEENT: Normocephalic, atraumatic. Moist mucous membranes. Clear conjunctiva   CARDIOVASCULAR: Normal S1 and S2, No murmur, Regular rate and rhythm.  RESPIRATORY: B/l bibasilar crackles present predominantly on Left side, RLL diminished breath sounds.   GASTROINTESTINAL: Soft, nontender. Mildly distended +ve fluid shift bs normoactive   EXTREMITIES: No cyanosis, no edema. Left arm AVF covered with clean dressing,   Bruit +  SKIN: warm and dry skin turgor wnl        LABS                          11.3   3.4   )-----------( 230      ( 17 Feb 2019 07:06 )             37.7         02-17    138  |  95<L>  |  16.0  ----------------------------<  149<H>  4.1   |  30.0<H>  |  3.74<H>    Ca    8.8      17 Feb 2019 07:06  Phos  4.3     02-17  Mg     2.1     02-17    TPro  7.3  /  Alb  3.2<L>  /  TBili  0.5  /  DBili  x   /  AST  12  /  ALT  <5  /  AlkPhos  101  02-17    LIVER FUNCTIONS - ( 17 Feb 2019 07:06 )  Alb: 3.2 g/dL / Pro: 7.3 g/dL / ALK PHOS: 101 U/L / ALT: <5 U/L / AST: 12 U/L / GGT: x           Culture - Body Fluid with Gram Stain (collected 15 Feb 2019 16:23)  Source: .Body Fluid  Gram Stain (15 Feb 2019 20:11):    Few White blood cells    No organisms seen  Preliminary Report (17 Feb 2019 15:50):    No growth at 2 days.    Culture in progress        IMAGING  < from: US Abdomen Limited (02.13.19 @ 15:52) >  FINDINGS:    Sonographic evaluation of 4 quadrants of the abdomen demonstrates mild to   moderate ascites.    Nonspecific gallbladder wall thickening is noted measuring 8 mm. There is   possibility of a 4 mm sized adherent calculus within the gallbladder.    Visualized right kidney demonstrates increased cortical echogenicity   indicating renal parenchymal disease.    There is evidence of bilateral pleural effusions.      IMPRESSION:     Mild to moderate ascites.    Renal parenchymal disease involving the visualized right kidney.    Additional findings as above.    < end of copied text >      DIET: Consistent carbohydrates Renal/ No snacks    MEDICATIONS  (STANDING):  amLODIPine   Tablet 2.5 milliGRAM(s) Oral daily  ascorbic acid 500 milliGRAM(s) Oral daily  aspirin enteric coated 81 milliGRAM(s) Oral daily  calcitriol   Capsule 0.25 MICROGram(s) Oral daily  carvedilol 12.5 milliGRAM(s) Oral every 12 hours  cefepime   IVPB 2000 milliGRAM(s) IV Intermittent daily  dextrose 5%. 1000 milliLiter(s) (50 mL/Hr) IV Continuous <Continuous>  dextrose 50% Injectable 12.5 Gram(s) IV Push once  dextrose 50% Injectable 25 Gram(s) IV Push once  dextrose 50% Injectable 25 Gram(s) IV Push once  ferrous    sulfate 325 milliGRAM(s) Oral daily  heparin  Injectable 5000 Unit(s) SubCutaneous every 12 hours  lidocaine   Patch 1 Patch Transdermal daily  pantoprazole    Tablet 40 milliGRAM(s) Oral before breakfast  polyethylene glycol 3350 17 Gram(s) Oral daily  saccharomyces boulardii 250 milliGRAM(s) Oral two times a day  sacubitril 49 mG/valsartan 51 mG 1 Tablet(s) Oral two times a day    MEDICATIONS  (PRN):  dextrose 40% Gel 15 Gram(s) Oral once PRN Blood Glucose LESS THAN 70 milliGRAM(s)/deciliter  glucagon  Injectable 1 milliGRAM(s) IntraMuscular once PRN Glucose LESS THAN 70 milligrams/deciliter

## 2019-02-18 NOTE — PROGRESS NOTE ADULT - SUBJECTIVE AND OBJECTIVE BOX
NEPHROLOGY INTERVAL HPI/OVERNIGHT EVENTS:    Examined earlier   Feeling better    MEDICATIONS  (STANDING):  amLODIPine   Tablet 2.5 milliGRAM(s) Oral daily  ascorbic acid 500 milliGRAM(s) Oral daily  aspirin enteric coated 81 milliGRAM(s) Oral daily  calcitriol   Capsule 0.25 MICROGram(s) Oral daily  carvedilol 12.5 milliGRAM(s) Oral every 12 hours  cefepime   IVPB 2000 milliGRAM(s) IV Intermittent daily  dextrose 5%. 1000 milliLiter(s) (50 mL/Hr) IV Continuous <Continuous>  dextrose 50% Injectable 12.5 Gram(s) IV Push once  dextrose 50% Injectable 25 Gram(s) IV Push once  dextrose 50% Injectable 25 Gram(s) IV Push once  ferrous    sulfate 325 milliGRAM(s) Oral daily  heparin  Injectable 5000 Unit(s) SubCutaneous every 12 hours  lidocaine   Patch 1 Patch Transdermal daily  pantoprazole    Tablet 40 milliGRAM(s) Oral before breakfast  polyethylene glycol 3350 17 Gram(s) Oral daily  saccharomyces boulardii 250 milliGRAM(s) Oral two times a day  sacubitril 49 mG/valsartan 51 mG 1 Tablet(s) Oral two times a day    MEDICATIONS  (PRN):  dextrose 40% Gel 15 Gram(s) Oral once PRN Blood Glucose LESS THAN 70 milliGRAM(s)/deciliter  glucagon  Injectable 1 milliGRAM(s) IntraMuscular once PRN Glucose LESS THAN 70 milligrams/deciliter      Allergies    No Known Allergies    Intolerances        Vital Signs Last 24 Hrs  T(C): 36.7 (18 Feb 2019 07:13), Max: 36.9 (17 Feb 2019 16:11)  T(F): 98.1 (18 Feb 2019 07:13), Max: 98.4 (17 Feb 2019 16:11)  HR: 73 (18 Feb 2019 07:13) (69 - 73)  BP: 141/78 (18 Feb 2019 07:13) (140/77 - 159/88)  BP(mean): --  RR: 18 (18 Feb 2019 07:13) (18 - 18)  SpO2: 95% (18 Feb 2019 07:13) (95% - 95%)  Daily     Daily     PHYSICAL EXAM:  GENERAL: Mild sob  NECK: Supple, No jvd  NERVOUS SYSTEM:  A/O x3, non focal  CHEST:  Clear with diminished basilar breath sounds (L > R)  HEART:  RRR, No rub  ABDOMEN: Soft, NT/ND BS+  EXTREMITIES:  + LE edema    LABS:                        11.3   3.4   )-----------( 230      ( 17 Feb 2019 07:06 )             37.7     02-18    134<L>  |  94<L>  |  24.0<H>  ----------------------------<  151<H>  4.8   |  27.0  |  4.44<H>    Ca    8.8      18 Feb 2019 08:05  Phos  5.2     02-18  Mg     2.2     02-18    TPro  7.3  /  Alb  3.2<L>  /  TBili  0.5  /  DBili  x   /  AST  12  /  ALT  <5  /  AlkPhos  101  02-17        Magnesium, Serum: 2.2 mg/dL (02-18 @ 08:05)  Phosphorus Level, Serum: 5.2 mg/dL (02-18 @ 08:05)          RADIOLOGY & ADDITIONAL TESTS:

## 2019-02-18 NOTE — PROGRESS NOTE ADULT - ASSESSMENT
ESRD: + fluid overload -improved- dyspnea better  - HD kiara w additional UF removal  Usual schedule is MWF - ([facility]) will switch back at some point    Anemia: MARGI at HD   - cont oral Fe  - trend H/H==> target Hgb>10.0    RO: phos ok  - cont Calcitriol    Will follow

## 2019-02-18 NOTE — PROGRESS NOTE ADULT - ASSESSMENT
59 y/o woman with PMH of ESRD on HD, DM2, Paroxysmal AFib, CHF with recent hospitalization in [facility] with + flu and right pleural effusion s/p thoracocentesis and sent home with supplemental O2 3L brought in from home, during her outpt follow up had a CXR that showed loculated effusion so Dr. Nolasco send her for admission and work up.   Chest CT in [facility] showed multiple loculations in right lung.     Pneumonia  Loculated pleural effusion  ESRD     - Blood culture negative x 2   - Procalcitonin 0.20  - Leukopenia in baseline   - Afebrile  S/P THORACENTESIS 2/15  FLUID CX NEG SO FAR  BLOOD CX NEG  PT HAS RECEIVED ABOUT 10 DAYS  IV ABX WILL D/W PULMONARY 59 y/o woman with PMH of ESRD on HD, DM2, Paroxysmal AFib, CHF with recent hospitalization in [facility] with + flu and right pleural effusion s/p thoracocentesis and sent home with supplemental O2 3L brought in from home, during her outpt follow up had a CXR that showed loculated effusion so Dr. Nolasco send her for admission and work up.   Chest CT in [facility] showed multiple loculations in right lung.     Pneumonia  Loculated pleural effusion  ESRD     - Blood culture negative x 2   - Procalcitonin 0.20  - Leukopenia in baseline   - Afebrile  S/P PARACENTESIS  ON  2/15  FLUID CX NEG SO FAR  BLOOD CX NEG  PT HAS RECEIVED ABOUT 10 DAYS  IV ABX WILL D/W PULMONARY  HOSPITALIST

## 2019-02-19 ENCOUNTER — INBOUND DOCUMENT (OUTPATIENT)
Age: 59
End: 2019-02-19

## 2019-02-19 VITALS — HEART RATE: 78 BPM | DIASTOLIC BLOOD PRESSURE: 85 MMHG | SYSTOLIC BLOOD PRESSURE: 150 MMHG

## 2019-02-19 LAB
ANION GAP SERPL CALC-SCNC: 16 MMOL/L — SIGNIFICANT CHANGE UP (ref 5–17)
BUN SERPL-MCNC: 37 MG/DL — HIGH (ref 8–20)
CALCIUM SERPL-MCNC: 8.9 MG/DL — SIGNIFICANT CHANGE UP (ref 8.6–10.2)
CHLORIDE SERPL-SCNC: 93 MMOL/L — LOW (ref 98–107)
CO2 SERPL-SCNC: 25 MMOL/L — SIGNIFICANT CHANGE UP (ref 22–29)
CREAT SERPL-MCNC: 5.68 MG/DL — HIGH (ref 0.5–1.3)
GLUCOSE BLDC GLUCOMTR-MCNC: 160 MG/DL — HIGH (ref 70–99)
GLUCOSE SERPL-MCNC: 157 MG/DL — HIGH (ref 70–115)
HCT VFR BLD CALC: 35.2 % — LOW (ref 37–47)
HGB BLD-MCNC: 10.9 G/DL — LOW (ref 12–16)
MAGNESIUM SERPL-MCNC: 2.3 MG/DL — SIGNIFICANT CHANGE UP (ref 1.6–2.6)
MCHC RBC-ENTMCNC: 27.4 PG — SIGNIFICANT CHANGE UP (ref 27–31)
MCHC RBC-ENTMCNC: 31 G/DL — LOW (ref 32–36)
MCV RBC AUTO: 88.4 FL — SIGNIFICANT CHANGE UP (ref 81–99)
PHOSPHATE SERPL-MCNC: 5.8 MG/DL — HIGH (ref 2.4–4.7)
PLATELET # BLD AUTO: 241 K/UL — SIGNIFICANT CHANGE UP (ref 150–400)
POTASSIUM SERPL-MCNC: 5.4 MMOL/L — HIGH (ref 3.5–5.3)
POTASSIUM SERPL-SCNC: 5.4 MMOL/L — HIGH (ref 3.5–5.3)
RBC # BLD: 3.98 M/UL — LOW (ref 4.4–5.2)
RBC # FLD: 15.9 % — HIGH (ref 11–15.6)
SODIUM SERPL-SCNC: 134 MMOL/L — LOW (ref 135–145)
WBC # BLD: 3.8 K/UL — LOW (ref 4.8–10.8)
WBC # FLD AUTO: 3.8 K/UL — LOW (ref 4.8–10.8)

## 2019-02-19 PROCEDURE — 36415 COLL VENOUS BLD VENIPUNCTURE: CPT

## 2019-02-19 PROCEDURE — 76942 ECHO GUIDE FOR BIOPSY: CPT

## 2019-02-19 PROCEDURE — 71045 X-RAY EXAM CHEST 1 VIEW: CPT

## 2019-02-19 PROCEDURE — 93005 ELECTROCARDIOGRAM TRACING: CPT

## 2019-02-19 PROCEDURE — 93306 TTE W/DOPPLER COMPLETE: CPT

## 2019-02-19 PROCEDURE — 87070 CULTURE OTHR SPECIMN AEROBIC: CPT

## 2019-02-19 PROCEDURE — 93975 VASCULAR STUDY: CPT

## 2019-02-19 PROCEDURE — 83615 LACTATE (LD) (LDH) ENZYME: CPT

## 2019-02-19 PROCEDURE — 87633 RESP VIRUS 12-25 TARGETS: CPT

## 2019-02-19 PROCEDURE — 83735 ASSAY OF MAGNESIUM: CPT

## 2019-02-19 PROCEDURE — 87075 CULTR BACTERIA EXCEPT BLOOD: CPT

## 2019-02-19 PROCEDURE — 83880 ASSAY OF NATRIURETIC PEPTIDE: CPT

## 2019-02-19 PROCEDURE — 87581 M.PNEUMON DNA AMP PROBE: CPT

## 2019-02-19 PROCEDURE — 83605 ASSAY OF LACTIC ACID: CPT

## 2019-02-19 PROCEDURE — 84145 PROCALCITONIN (PCT): CPT

## 2019-02-19 PROCEDURE — 99239 HOSP IP/OBS DSCHRG MGMT >30: CPT

## 2019-02-19 PROCEDURE — 96368 THER/DIAG CONCURRENT INF: CPT

## 2019-02-19 PROCEDURE — 85730 THROMBOPLASTIN TIME PARTIAL: CPT

## 2019-02-19 PROCEDURE — 82945 GLUCOSE OTHER FLUID: CPT

## 2019-02-19 PROCEDURE — 85027 COMPLETE CBC AUTOMATED: CPT

## 2019-02-19 PROCEDURE — 87040 BLOOD CULTURE FOR BACTERIA: CPT

## 2019-02-19 PROCEDURE — 84157 ASSAY OF PROTEIN OTHER: CPT

## 2019-02-19 PROCEDURE — 96366 THER/PROPH/DIAG IV INF ADDON: CPT

## 2019-02-19 PROCEDURE — 86480 TB TEST CELL IMMUN MEASURE: CPT

## 2019-02-19 PROCEDURE — 88112 CYTOPATH CELL ENHANCE TECH: CPT

## 2019-02-19 PROCEDURE — 99285 EMERGENCY DEPT VISIT HI MDM: CPT | Mod: 25

## 2019-02-19 PROCEDURE — 99232 SBSQ HOSP IP/OBS MODERATE 35: CPT

## 2019-02-19 PROCEDURE — 80048 BASIC METABOLIC PNL TOTAL CA: CPT

## 2019-02-19 PROCEDURE — 85610 PROTHROMBIN TIME: CPT

## 2019-02-19 PROCEDURE — 86703 HIV-1/HIV-2 1 RESULT ANTBDY: CPT

## 2019-02-19 PROCEDURE — 85652 RBC SED RATE AUTOMATED: CPT

## 2019-02-19 PROCEDURE — 97116 GAIT TRAINING THERAPY: CPT

## 2019-02-19 PROCEDURE — 97530 THERAPEUTIC ACTIVITIES: CPT

## 2019-02-19 PROCEDURE — 87340 HEPATITIS B SURFACE AG IA: CPT

## 2019-02-19 PROCEDURE — 86706 HEP B SURFACE ANTIBODY: CPT

## 2019-02-19 PROCEDURE — 71046 X-RAY EXAM CHEST 2 VIEWS: CPT

## 2019-02-19 PROCEDURE — 96365 THER/PROPH/DIAG IV INF INIT: CPT

## 2019-02-19 PROCEDURE — 71250 CT THORAX DX C-: CPT

## 2019-02-19 PROCEDURE — 84100 ASSAY OF PHOSPHORUS: CPT

## 2019-02-19 PROCEDURE — 74176 CT ABD & PELVIS W/O CONTRAST: CPT

## 2019-02-19 PROCEDURE — 88305 TISSUE EXAM BY PATHOLOGIST: CPT

## 2019-02-19 PROCEDURE — 80053 COMPREHEN METABOLIC PANEL: CPT

## 2019-02-19 PROCEDURE — 82962 GLUCOSE BLOOD TEST: CPT

## 2019-02-19 PROCEDURE — 97110 THERAPEUTIC EXERCISES: CPT

## 2019-02-19 PROCEDURE — 87486 CHLMYD PNEUM DNA AMP PROBE: CPT

## 2019-02-19 PROCEDURE — 86431 RHEUMATOID FACTOR QUANT: CPT

## 2019-02-19 PROCEDURE — T1013: CPT

## 2019-02-19 PROCEDURE — 99261: CPT

## 2019-02-19 PROCEDURE — 82042 OTHER SOURCE ALBUMIN QUAN EA: CPT

## 2019-02-19 PROCEDURE — 87798 DETECT AGENT NOS DNA AMP: CPT

## 2019-02-19 PROCEDURE — C1729: CPT

## 2019-02-19 PROCEDURE — 89051 BODY FLUID CELL COUNT: CPT

## 2019-02-19 PROCEDURE — 80202 ASSAY OF VANCOMYCIN: CPT

## 2019-02-19 PROCEDURE — 76705 ECHO EXAM OF ABDOMEN: CPT

## 2019-02-19 PROCEDURE — 87205 SMEAR GRAM STAIN: CPT

## 2019-02-19 RX ORDER — ASCORBIC ACID 60 MG
1 TABLET,CHEWABLE ORAL
Qty: 30 | Refills: 0 | OUTPATIENT
Start: 2019-02-19 | End: 2019-03-20

## 2019-02-19 RX ORDER — POLYETHYLENE GLYCOL 3350 17 G/17G
20 POWDER, FOR SOLUTION ORAL
Qty: 0 | Refills: 0 | COMMUNITY

## 2019-02-19 RX ORDER — CHLORHEXIDINE GLUCONATE 213 G/1000ML
1 SOLUTION TOPICAL DAILY
Qty: 0 | Refills: 0 | Status: DISCONTINUED | OUTPATIENT
Start: 2019-02-19 | End: 2019-02-19

## 2019-02-19 RX ADMIN — Medication 250 MILLIGRAM(S): at 05:53

## 2019-02-19 RX ADMIN — AMLODIPINE BESYLATE 2.5 MILLIGRAM(S): 2.5 TABLET ORAL at 05:53

## 2019-02-19 RX ADMIN — HEPARIN SODIUM 5000 UNIT(S): 5000 INJECTION INTRAVENOUS; SUBCUTANEOUS at 05:53

## 2019-02-19 RX ADMIN — PANTOPRAZOLE SODIUM 40 MILLIGRAM(S): 20 TABLET, DELAYED RELEASE ORAL at 05:53

## 2019-02-19 RX ADMIN — CARVEDILOL PHOSPHATE 12.5 MILLIGRAM(S): 80 CAPSULE, EXTENDED RELEASE ORAL at 05:53

## 2019-02-19 RX ADMIN — LIDOCAINE 1 PATCH: 4 CREAM TOPICAL at 14:17

## 2019-02-19 RX ADMIN — SACUBITRIL AND VALSARTAN 1 TABLET(S): 24; 26 TABLET, FILM COATED ORAL at 17:47

## 2019-02-19 RX ADMIN — CALCITRIOL 0.25 MICROGRAM(S): 0.5 CAPSULE ORAL at 14:16

## 2019-02-19 RX ADMIN — Medication 81 MILLIGRAM(S): at 14:15

## 2019-02-19 RX ADMIN — CHLORHEXIDINE GLUCONATE 1 APPLICATION(S): 213 SOLUTION TOPICAL at 14:13

## 2019-02-19 RX ADMIN — POLYETHYLENE GLYCOL 3350 17 GRAM(S): 17 POWDER, FOR SOLUTION ORAL at 14:23

## 2019-02-19 RX ADMIN — Medication 500 MILLIGRAM(S): at 14:16

## 2019-02-19 RX ADMIN — SACUBITRIL AND VALSARTAN 1 TABLET(S): 24; 26 TABLET, FILM COATED ORAL at 05:53

## 2019-02-19 RX ADMIN — CARVEDILOL PHOSPHATE 12.5 MILLIGRAM(S): 80 CAPSULE, EXTENDED RELEASE ORAL at 17:47

## 2019-02-19 RX ADMIN — Medication 325 MILLIGRAM(S): at 14:16

## 2019-02-19 NOTE — PROGRESS NOTE ADULT - ASSESSMENT
57 yo female with hx of ESRD on HD MWF, chronic HFrEF (EF 35-40% TTE 02/11/19), NICM (cardiac cath 03/18 non-obstructive CAD), paroxysmal Afib (no AC due to GI bleed), uremic pericarditis s/p pericardiocentesis, right sided pleural effusion s/p pigtail catheter, IDDM, and HTN with recent hospitalization in [facility] 2/2 positive Influenza A complicated with right pleural effusion requiring thoracocentesis and discharged home on supplemental O2 3L/min, currently presenting to ED referred from Pulmonologist after finding of loculated R pleural effusion on CT chest of unclear etiology, associated with dyspnea on exertion and noted peripheral edema, with moderate ascites.   Pt received consecutive HD sessions x 7  to offload fluid 2/11 and 2/12 with improvement in sob.     CT sx/ IR and pulm consulted. As per CT sx need drainage of ascitic fluid prior to draining R loculated effusion for further evaluation of etiology, but given overall fluid overload state, offloading of fluid with HD was continued and paracentesis was performed on 02/15/19 draining 3550mL of fluid. Both interventions resulting in return of respiratory status and O2 requirements to patient's baseline.      TTE completed and noted drop in EF from 55% to 35-40% and mobile density noted on aortic valve leaflets, upon review of prior TTE, the latter finding was already present and EF might have been overestimated as per cardiology. 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 Patient will probably discharge home  within 24 hours  after HD session.   Volume Overload  -characterized by HAYES  -likely multifactorial 2/2 loculated R pleural effusion, ascites in the setting ESRD/ CHF exac  - Patient remains afebrile, blood cultures negative.   - SOB is improved and at her baseline O2 requirement of 3l via NC   - S/p Vanco   - C/w cefepime, per ID for pleural effusion, has now completed prolonged abx course. Will need to clarify antibiotic therapy since initial plan was to steer therapy based on culture of pleural fluid but as per CT surgery thoracentesis is not indicated.  - c/w Florastor while on ABX  - c/w offloading excess fluid with HD, next session deferred to 02/19 as per Nephro  - CXR shows overall improvement.  - sp paracentesis, SAAG 1.4, hepatic us w/ doppler unremarkable- budd chiari ruled out, no hepatic failure or cirrhosis or clear malignancy, suspect volume overload likely related to HF exac.   - Pulmonology f/u noted and appreciated .   - CT surgery: No further intervention from thoracic standpoint  - Probably d/c today after HD, patient had HD on [facility]    Acute on chronic combined HF  - ef 35-40%, grade 2 dd   - Improved peripheral edema, ascites and crackles in lungs with slight improvement.   - Non obstructive CAD by cath in 3/2018  - c/w Strict I/o   -Fluid restriction    - daily weight   - c/w entresto po bid, coreg po bid   - c/w offloading fluid with HD, sched per renal    ESRD on HD   -(MWF via Left AVF as outpt) with normocytic anemia   - c/w calcitriol 0.25mg po qd   - Nephro f/u noted and appreciated, HD sched per them while inpt    DM2 on long term insulin complicated by hyperglycemia asymptomatic + ep of hypoglycemia in 50s  - Last HbA1C: 6.2 on 01/01/2019, controlled, goal < 7  - episode of hypoglycemia on 2/10/19   - cont holding long + short acting insulin, resume short acting ss coverage if BG remains >/= 180  - c/w hypoglycemia protocol     HTN  - bp stable, goal bp < 140/90, suoptimally controlled w/ sbp into 150s at times  -c/w Coreg 12.5mg BID PO, Amlodipine 2.5 QD PO, Entresto 49/51 PO QD  -bp control w/ HD for volume removal as well    Chronic constipation  - stable  - c/w Miralax qd with holding parameters     Prophylactic measure:  dvt ppx w/ sqh  Florastor while on Abx  ppi daily    Advanced Directive: Full code     Dispo: Pt at baseline walks independently and has chronic O2 at home. Lives with her son. PT consulted and recommended home w/ assist but since evaluation pt has been independently ambulating to the bathroom and around her room. Probably d/c today after HD, goes to [facility].

## 2019-02-19 NOTE — PROGRESS NOTE ADULT - ASSESSMENT
ESRD: + fluid overload -improved- dyspnea better  - HD today w additional UF removal  Usual schedule is MWF - ([facility]) will switch back at some point    Anemia: MARGI at HD   - cont oral Fe  - trend H/H==> target Hgb>10.0    RO: phos ok  - cont Calcitriol    If discharged today she knows that she should go to   her HD center [facility] on her regular MWF  schedule

## 2019-02-19 NOTE — PROGRESS NOTE ADULT - NSHPATTENDINGPLANDISCUSS_GEN_ALL_CORE
the patient.  All imaging and results of lab/other studies reviewed by me. All questions answered to the satisfaction of the patient. At this time they agree with the current plan of therapy.
Patient, RN, PGY-1-2-3, Dr. Null, , Dr. Larsen
Patient, RN, pgy-1-2-3
the patient.  All imaging and results of lab/other studies reviewed by me. All questions answered to the satisfaction of the patient. At this time they agree with the current plan of therapy.
Patient RN PGY-1-2-3
patient pgy- 1-2

## 2019-02-19 NOTE — PROGRESS NOTE ADULT - REASON FOR ADMISSION
sent from pulmonology office

## 2019-02-19 NOTE — PROGRESS NOTE ADULT - PROVIDER SPECIALTY LIST ADULT
CT Surgery
Family Medicine
Infectious Disease
Nephrology
Pulmonology
Thoracic Surgery
Thoracic Surgery
Family Medicine
Pulmonology

## 2019-02-19 NOTE — PROGRESS NOTE ADULT - SUBJECTIVE AND OBJECTIVE BOX
58y Female,   Patient is a 58y old  Female who presents with a chief complaint of sent from pulmonology office (18 Feb 2019 10:45)      HOSPITALIZATION DAY:  [facility] [id number] [id number]    Antibiotic:  s/p Cefepime and Vancomycin    INTERVAL/OVERNIGHT EVENTS:  Examined at bedside. No acute events overnight.  Patient states she has been using the supplemental O2 less frequently and did not needed while sleeping.   Patient denies chest pain, calf pain, abdominal pain, headaches, fever, chills, dysuria, hematuria, diarrhea, constipation, SOB, palpitations, pedal edema.   Rest of ROS not contributory except for above.      I&O's Summary    18 Feb 2019 07:01  -  19 Feb 2019 07:00  --------------------------------------------------------  IN: 50 mL / OUT: 0 mL / NET: 50 mL      CAPILLARY BLOOD GLUCOSE    POCT Blood Glucose.: 185 mg/dL (18 Feb 2019 21:04)  POCT Blood Glucose.: 165 mg/dL (18 Feb 2019 16:51)  POCT Blood Glucose.: 145 mg/dL (18 Feb 2019 07:44)    VITALS    T(C): 36.6 (02-18-19 @ 23:06), Max: 36.6 (02-18-19 @ 23:06)  HR: 67 (02-19-19 @ 05:52) (67 - 69)  BP: 147/80 (02-19-19 @ 05:52) (142/62 - 151/85)  RR: 18 (02-18-19 @ 23:06) (16 - 18)  SpO2: 98% (02-18-19 @ 16:48) (98% - 98%)      PHYSICAL EXAM:  Vital signs reviewed.  GENERAL: Patient is resting comfortably in bed. Pleasant and cooperative. Not wearing NC, sat 95% at room air.   HEENT: Normocephalic, atraumatic. Moist mucous membranes. Clear conjunctiva   CARDIOVASCULAR: Normal S1 and S2, No murmur, Regular rate and rhythm.  RESPIRATORY: B/l bibasilar crackles present predominantly on Left side, RLL diminished breath sounds.   GASTROINTESTINAL: Soft, nontender. Mildly distended +ve fluid shift bs normoactive   EXTREMITIES: No cyanosis, no edema. Left arm AVF covered with clean dressing,   Bruit +  SKIN: warm and dry skin turgor wnl        LABS  02-19    134<L>  |  93<L>  |  37.0<H>  ----------------------------<  157<H>  5.4<H>   |  25.0  |  5.68<H>    Ca    8.9      19 Feb 2019 06:46  Phos  5.8     02-19  Mg     2.3     02-19    IMAGING      DIET: Consistent carbohydrates Renal/ No snacks    MEDICATIONS  (STANDING):  amLODIPine   Tablet 2.5 milliGRAM(s) Oral daily  ascorbic acid 500 milliGRAM(s) Oral daily  aspirin enteric coated 81 milliGRAM(s) Oral daily  calcitriol   Capsule 0.25 MICROGram(s) Oral daily  carvedilol 12.5 milliGRAM(s) Oral every 12 hours  dextrose 5%. 1000 milliLiter(s) (50 mL/Hr) IV Continuous <Continuous>  dextrose 50% Injectable 12.5 Gram(s) IV Push once  dextrose 50% Injectable 25 Gram(s) IV Push once  dextrose 50% Injectable 25 Gram(s) IV Push once  ferrous    sulfate 325 milliGRAM(s) Oral daily  heparin  Injectable 5000 Unit(s) SubCutaneous every 12 hours  lidocaine   Patch 1 Patch Transdermal daily  pantoprazole    Tablet 40 milliGRAM(s) Oral before breakfast  polyethylene glycol 3350 17 Gram(s) Oral daily  saccharomyces boulardii 250 milliGRAM(s) Oral two times a day  sacubitril 49 mG/valsartan 51 mG 1 Tablet(s) Oral two times a day    MEDICATIONS  (PRN):  dextrose 40% Gel 15 Gram(s) Oral once PRN Blood Glucose LESS THAN 70 milliGRAM(s)/deciliter  glucagon  Injectable 1 milliGRAM(s) IntraMuscular once PRN Glucose LESS THAN 70 milligrams/deciliter

## 2019-02-19 NOTE — PROGRESS NOTE ADULT - ATTENDING COMMENTS
Patient seen and examined at the bedside. I was physically present for key portions of the evaluation and management services provided. Agree with the above history, physical, assessment, and plan with the necessary amendments/elaborations below:    clinically stable. currently undergoing HD during physical and exam, tolerating well. plan for dc today, agree w/ nephro that volume status has returned to baseline + symptoms of dyspnea have resolved. per renal patient to return to normal MWF schedule of HD, she is to go to her center tomorrow for follow up session.     also advised fu w/ PMD + cardio.
Note addended where needed
note addended where needed
Note addended where needed. Plan discussed with patient at bedside with Uzbek speaking resident and daughter in law over the phone.
Patient seen and examined at the bedside. I was physically present for key portions of the evaluation and management services provided. Agree with the above history, physical, assessment, and plan with the necessary amendments/elaborations already made above.
Note addended where needed
Note addended where needed

## 2019-02-19 NOTE — PROGRESS NOTE ADULT - SUBJECTIVE AND OBJECTIVE BOX
NEPHROLOGY INTERVAL HPI/OVERNIGHT EVENTS:    Examined earlier  HD today    MEDICATIONS  (STANDING):  amLODIPine   Tablet 2.5 milliGRAM(s) Oral daily  ascorbic acid 500 milliGRAM(s) Oral daily  aspirin enteric coated 81 milliGRAM(s) Oral daily  calcitriol   Capsule 0.25 MICROGram(s) Oral daily  carvedilol 12.5 milliGRAM(s) Oral every 12 hours  dextrose 5%. 1000 milliLiter(s) (50 mL/Hr) IV Continuous <Continuous>  dextrose 50% Injectable 12.5 Gram(s) IV Push once  dextrose 50% Injectable 25 Gram(s) IV Push once  dextrose 50% Injectable 25 Gram(s) IV Push once  ferrous    sulfate 325 milliGRAM(s) Oral daily  heparin  Injectable 5000 Unit(s) SubCutaneous every 12 hours  lidocaine   Patch 1 Patch Transdermal daily  pantoprazole    Tablet 40 milliGRAM(s) Oral before breakfast  polyethylene glycol 3350 17 Gram(s) Oral daily  saccharomyces boulardii 250 milliGRAM(s) Oral two times a day  sacubitril 49 mG/valsartan 51 mG 1 Tablet(s) Oral two times a day    MEDICATIONS  (PRN):  dextrose 40% Gel 15 Gram(s) Oral once PRN Blood Glucose LESS THAN 70 milliGRAM(s)/deciliter  glucagon  Injectable 1 milliGRAM(s) IntraMuscular once PRN Glucose LESS THAN 70 milligrams/deciliter      Allergies    No Known Allergies    Intolerances        Vital Signs Last 24 Hrs  T(C): 36.7 (19 Feb 2019 07:25), Max: 36.7 (19 Feb 2019 07:25)  T(F): 98.1 (19 Feb 2019 07:25), Max: 98.1 (19 Feb 2019 07:25)  HR: 67 (19 Feb 2019 07:25) (67 - 69)  BP: 144/77 (19 Feb 2019 07:25) (142/62 - 151/85)  BP(mean): --  RR: 18 (19 Feb 2019 07:25) (16 - 18)  SpO2: 98% (18 Feb 2019 16:48) (98% - 98%)  Daily     Daily     PHYSICAL EXAM:  GENERAL: Mild sob  NECK: Supple, No jvd  NERVOUS SYSTEM:  A/O x3, non focal  CHEST:  Clear with diminished basilar breath sounds (L > R)  HEART:  RRR, No rub  ABDOMEN: Soft, NT/ND BS+  EXTREMITIES:  + LE edema    LABS:    02-19    134<L>  |  93<L>  |  37.0<H>  ----------------------------<  157<H>  5.4<H>   |  25.0  |  5.68<H>    Ca    8.9      19 Feb 2019 06:46  Phos  5.8     02-19  Mg     2.3     02-19          Magnesium, Serum: 2.3 mg/dL (02-19 @ 06:46)  Phosphorus Level, Serum: 5.8 mg/dL (02-19 @ 06:46)          RADIOLOGY & ADDITIONAL TESTS:

## 2019-02-19 NOTE — PROGRESS NOTE ADULT - SUBJECTIVE AND OBJECTIVE BOX
[facility]  INFECTIOUS DISEASES AND INTERNAL MEDICINE at [place]  =======================================================  Jose Carlos Servin MD  Diplomates American Board of Internal Medicine and Infectious Diseases  =======================================================    MRN-[id number]  JAROCHO MORALES     Follow up: loculated pleural effusion    NAD no respiratory distress. No cough. t   Afebrile.   SEEN EARLIER WHILE IN DIALYSIS UNIT    PAST MEDICAL & SURGICAL HISTORY:  Coronary artery disease, angina presence unspecified, unspecified vessel or lesion type, unspecified whether native or transplanted heart  Paroxysmal atrial fibrillation  Anemia due to chronic kidney disease, unspecified CKD stage  Chronic systolic congestive heart failure  Pleural effusion  Pericardial effusion  End stage renal disease on dialysis  HLD (hyperlipidemia)  HTN (hypertension)  DM (diabetes mellitus)  A-V fistula  Encounter for dialysis catheter care    Social Hx: no smoking or other toxic habits    FAMILY HISTORY:  Family history of kidney disease in brother (Sibling)    Allergies  No Known Allergies    Antibiotics:  cefepime   IVPB 2000 milliGRAM(s) IV Intermittent daily  vancomycin  IVPB 1000 milliGRAM(s) IV Intermittent every 48 hours     REVIEW OF SYSTEMS:  CONSTITUTIONAL:  No Fever or chills  HEENT:  No diplopia or blurred vision.  No sore throat or runny nose.  CARDIOVASCULAR:  No chest pain or SOB.  RESPIRATORY:  +Cough, +shortness of breath, PND or orthopnea.  GASTROINTESTINAL:  No nausea, vomiting or diarrhea.  GENITOURINARY:  No dysuria, frequency or urgency. No Blood in urine  MUSCULOSKELETAL:  no joint aches, no muscle pain  SKIN:  No change in skin, hair or nails.  NEUROLOGIC:  No paresthesias, fasciculations, seizures or weakness.  PSYCHIATRIC:  No disorder of thought or mood.  ENDOCRINE:  No heat or cold intolerance, polyuria or polydipsia.  HEMATOLOGICAL:  No easy bruising or bleeding.     Physical Exam:   Vital Signs Last 24 Hrs  T(C): 36.6 (19 Feb 2019 13:10), Max: 36.7 (19 Feb 2019 07:25)  T(F): 97.9 (19 Feb 2019 13:10), Max: 98.1 (19 Feb 2019 07:25)  HR: 71 (19 Feb 2019 13:10) (67 - 96)  BP: 151/78 (19 Feb 2019 13:10) (142/62 - 151/85)  BP(mean): --  RR: 18 (19 Feb 2019 13:10) (16 - 18)  SpO2: 95% (19 Feb 2019 13:10) (95% - 98%)    GEN: NAD on o2  HEENT: normocephalic and atraumatic. EOMI. PERRL.    NECK: Supple.  No lymphadenopathy   LUNGS: Decreased BS in R lung in general, R lung with rales all over and left lung only on base.   HEART: Regular rate and rhythm   ABDOMEN: Soft, nontender, and nondistended.  Positive bowel sounds.    : No CVA tenderness  EXTREMITIES: Without any cyanosis, clubbing, rash, lesions or edema.  NEUROLOGIC: grossly intact.  PSYCHIATRIC: Appropriate affect .  SKIN: No ulceration or induration present.    Labs:                                              10.9   3.8   )-----------( 241      ( 19 Feb 2019 09:54 )             35.2   02-19    134<L>  |  93<L>  |  37.0<H>  ----------------------------<  157<H>  5.4<H>   |  25.0  |  5.68<H>    Ca    8.9      19 Feb 2019 06:46  Phos  5.8     02-19  Mg     2.3     02-19        02-09 @ 13:04 .Blood     No growth at 5 days.      All imaging and other data have been reviewed.    Impression: Chest CT 2/9  -Right perihilar consolidation and right interlobular septal thickening;   differential diagnosis includes malignancy and pneumonia.  -Small-moderate right pleural effusion is loculated, differential   diagnosis includes malignancy, parapneumonic effusion, or empyema.  -Severe global cardiomegaly.  -Small-moderate ascites, unclear etiology.

## 2019-02-19 NOTE — PROGRESS NOTE ADULT - ASSESSMENT
59 y/o woman with PMH of ESRD on HD, DM2, Paroxysmal AFib, CHF with recent hospitalization in [facility] with + flu and right pleural effusion s/p thoracocentesis and sent home with supplemental O2 3L brought in from home, during her outpt follow up had a CXR that showed loculated effusion so Dr. Nolasco send her for admission and work up.   Chest CT in [facility] showed multiple loculations in right lung.     Pneumonia  Loculated pleural effusion  ESRD     - Blood culture negative x 2   - Procalcitonin 0.20  - Leukopenia in baseline   - Afebrile  S/P PARACENTESIS  ON  2/15  FLUID CX NEG SO FAR  BLOOD CX NEG  PT HAS COMPLETED ABOUT 10 DAYS  IV ABX    ABX DISCONTINUED  FOR POSSIBLE D/C TODAY WILL FOLLOW UP AS NEEEDED   PLEASE CALL FOR QUESTIONS

## 2019-02-20 LAB
CULTURE RESULTS: SIGNIFICANT CHANGE UP
SPECIMEN SOURCE: SIGNIFICANT CHANGE UP

## 2019-02-21 LAB
NON-GYNECOLOGICAL CYTOLOGY STUDY: SIGNIFICANT CHANGE UP
NON-GYNECOLOGICAL CYTOLOGY STUDY: SIGNIFICANT CHANGE UP

## 2019-02-25 ENCOUNTER — APPOINTMENT (OUTPATIENT)
Dept: GASTROENTEROLOGY | Facility: CLINIC | Age: 59
End: 2019-02-25
Payer: MEDICARE

## 2019-02-25 VITALS
SYSTOLIC BLOOD PRESSURE: 126 MMHG | DIASTOLIC BLOOD PRESSURE: 64 MMHG | HEART RATE: 72 BPM | HEIGHT: 65 IN | WEIGHT: 140 LBS | RESPIRATION RATE: 16 BRPM | BODY MASS INDEX: 23.32 KG/M2

## 2019-02-25 PROCEDURE — 82272 OCCULT BLD FECES 1-3 TESTS: CPT

## 2019-02-25 PROCEDURE — 99214 OFFICE O/P EST MOD 30 MIN: CPT

## 2019-02-25 NOTE — ASSESSMENT
[FreeTextEntry1] : Personal history of colon polyp. Last colonoscopy was 4 years ago. Prior polyp was small and cecal in location. Five-year interval was for polyp surveillance. Return in one year for colonoscopy.\par Anasarca secondary insufficient hemodialysis. Recent large-volume paracentesis shows no evidence for portal hypertension. Cytology is negative. No infection. Patient needs adequate maintenance hemodialysis to keep ascites and pleural effusions controlled.\par H. pylori gastritis, identified, one year ago. Previously treated. Needs urea breath test for test of cure.\par Chronic constipation having 3 bowel movements per week. Current digital rectal exam shows soft stool, which is occult blood negative and brown. Advised continue with use of Colace 2 foot capsules per day. MiraLax on p.r.n. basis. GI office followup in one year.

## 2019-02-25 NOTE — PHYSICAL EXAM
[General Appearance - Alert] : alert [General Appearance - In No Acute Distress] : in no acute distress [Sclera] : the sclera and conjunctiva were normal [PERRL With Normal Accommodation] : pupils were equal in size, round, and reactive to light [Extraocular Movements] : extraocular movements were intact [Outer Ear] : the ears and nose were normal in appearance [Oropharynx] : the oropharynx was normal [Neck Appearance] : the appearance of the neck was normal [Neck Cervical Mass (___cm)] : no neck mass was observed [Jugular Venous Distention Increased] : there was no jugular-venous distention [Thyroid Diffuse Enlargement] : the thyroid was not enlarged [Thyroid Nodule] : there were no palpable thyroid nodules [Auscultation Breath Sounds / Voice Sounds] : lungs were clear to auscultation bilaterally [Heart Rate And Rhythm] : heart rate was normal and rhythm regular [Heart Sounds] : normal S1 and S2 [Heart Sounds Gallop] : no gallops [Murmurs] : no murmurs [Heart Sounds Pericardial Friction Rub] : no pericardial rub [Bowel Sounds] : normal bowel sounds [Abdomen Soft] : soft [Abdomen Tenderness] : non-tender [] : no hepato-splenomegaly [Abdomen Mass (___ Cm)] : no abdominal mass palpated [Normal Sphincter Tone] : normal sphincter tone [No Rectal Mass] : no rectal mass [Internal Hemorrhoid] : no internal hemorrhoids [External Hemorrhoid] : no external hemorrhoids [Occult Blood Positive] : stool was negative for occult blood [FreeTextEntry1] : No hemorrhoids. No lesions. Normal tone. Brown soft stool. Occult blood negative.

## 2019-02-25 NOTE — HISTORY OF PRESENT ILLNESS
[FreeTextEntry1] : 58-year-old [place]ese female with end-stage renal disease on chronic hemodialysis 3 times per week Monday, Wednesday, and Friday and pericarditis, and pleural effusions. Recently readmitted to his outside hospital or shortness of breath. Patient underwent thoracentesis for loculated pleural effusions. CAT scan abdomen and pelvis had shown no evidence of cirrhosis or portal hypertension. Ascites was present.\par Large-volume paracentesis was performed by IR. 3.5 L removed. Cultures were negative. High-protein ascites was identified consistent with chronic renal failure. Allergies were negative. Patient now returns for followup. She is due later today for hemodialysis.\par Personal history of iron deficiency anemia. Prior colonoscopy had revealed a small tubular adenoma 4 years ago in the cecum. Endoscopy one year ago as outside hospital for hematemesis revealed gastritis.  H. pylori was present and ultimately treated with antibiotics. Patient never had urea breath test for test of cure..

## 2019-03-17 LAB — UREA BREATH TEST QL: NEGATIVE

## 2019-04-01 PROCEDURE — G9005: CPT

## 2019-04-28 ENCOUNTER — EMERGENCY (EMERGENCY)
Facility: HOSPITAL | Age: 59
LOS: 1 days | Discharge: HOME | End: 2019-04-28
Attending: EMERGENCY MEDICINE
Payer: MEDICARE

## 2019-04-28 VITALS
OXYGEN SATURATION: 99 % | DIASTOLIC BLOOD PRESSURE: 101 MMHG | RESPIRATION RATE: 20 BRPM | SYSTOLIC BLOOD PRESSURE: 179 MMHG | HEIGHT: 65 IN | WEIGHT: 139.99 LBS | TEMPERATURE: 98 F | HEART RATE: 86 BPM

## 2019-04-28 DIAGNOSIS — I77.0 ARTERIOVENOUS FISTULA, ACQUIRED: Chronic | ICD-10-CM

## 2019-04-28 DIAGNOSIS — Z49.01 ENCOUNTER FOR FITTING AND ADJUSTMENT OF EXTRACORPOREAL DIALYSIS CATHETER: Chronic | ICD-10-CM

## 2019-04-28 PROCEDURE — 99283 EMERGENCY DEPT VISIT LOW MDM: CPT

## 2019-04-28 RX ORDER — TRAMADOL HYDROCHLORIDE 50 MG/1
25 TABLET ORAL ONCE
Qty: 0 | Refills: 0 | Status: DISCONTINUED | OUTPATIENT
Start: 2019-04-28 | End: 2019-04-28

## 2019-04-28 RX ORDER — TRAMADOL HYDROCHLORIDE 50 MG/1
1 TABLET ORAL
Qty: 3 | Refills: 0 | OUTPATIENT
Start: 2019-04-28 | End: 2019-04-30

## 2019-04-28 RX ADMIN — TRAMADOL HYDROCHLORIDE 25 MILLIGRAM(S): 50 TABLET ORAL at 15:27

## 2019-04-28 RX ADMIN — TRAMADOL HYDROCHLORIDE 25 MILLIGRAM(S): 50 TABLET ORAL at 14:27

## 2019-04-28 RX ADMIN — TRAMADOL HYDROCHLORIDE 25 MILLIGRAM(S): 50 TABLET ORAL at 15:51

## 2019-04-28 NOTE — ED STATDOCS - ATTENDING CONTRIBUTION TO CARE
I, Dinah Ambrosio, performed the initial face to face bedside interview with this patient regarding history of present illness, review of symptoms and relevant past medical, social and family history.  I completed an independent physical examination.  I was the initial provider who evaluated this patient. I have signed out the follow up of any pending tests (i.e. labs, radiological studies) to the ACP.  I have communicated the patient’s plan of care and disposition with the ACP.  The history, relevant review of systems, past medical and surgical history, medical decision making, and physical examination was documented by the scribe in my presence and I attest to the accuracy of the documentation.

## 2019-04-28 NOTE — ED STATDOCS - PROGRESS NOTE DETAILS
PA Note: PT evaluated by intake physician. HPI/PE/ROS as noted above. Will follow up plan per intake physician. PA Note: pt reports improvement in symptoms after medication administration. Pt to discharge with pain medication and follow up with her PCP. Pt acknowledged understanding.  : Julieth

## 2019-04-28 NOTE — ED STATDOCS - OBJECTIVE STATEMENT
57 y/o F pt with PMHx of HTN, HLD, DM, CAD, ESRD (on dialysis MWF), Afib presents to ED c/o persistent left knee pain and swelling that began 2 weeks ago. Notes the last 3 days the pain has worsened. She reports the pain is located behind her left knee and radiates down the entire lower leg. Pt had ultrasound completed, she does not have a blood clot, and was dx with a cyst behind her left leg. Pt took Tylenol with minimal relief. She is unable to bear weight due to increased pain. Denies fever, chills. No further acute complaints at this time.   ED : Julieth

## 2019-04-28 NOTE — ED STATDOCS - MUSCULOSKELETAL, MLM
range of motion is not limited and there is no muscle tenderness. pitting edema in LLe. neurovascurly intact. range of motion is not limited and there is no muscle tenderness. pitting edema in LLE. neurovascularly intact.

## 2019-04-28 NOTE — ED STATDOCS - CLINICAL SUMMARY MEDICAL DECISION MAKING FREE TEXT BOX
pt with musculoskeletal pain. negative DVT documented. full ROM. normal exam. will treat pain and reeval. pt with musculoskeletal pain. negative DVT documented. full ROM. normal exam. will treat pain and reeval.  STILL CO PAIN, LIMITED MED CHOICE SECONDARY TO HER UNDERLYING CONDITIONS  WILL REMEDICATE AND REEVAL

## 2019-05-01 ENCOUNTER — INPATIENT (INPATIENT)
Facility: HOSPITAL | Age: 59
LOS: 4 days | Discharge: HOME | DRG: 602 | End: 2019-05-06
Attending: INTERNAL MEDICINE | Admitting: FAMILY MEDICINE
Payer: MEDICARE

## 2019-05-01 VITALS
TEMPERATURE: 99 F | SYSTOLIC BLOOD PRESSURE: 165 MMHG | HEART RATE: 80 BPM | OXYGEN SATURATION: 99 % | RESPIRATION RATE: 14 BRPM | HEIGHT: 65 IN | WEIGHT: 139.99 LBS | DIASTOLIC BLOOD PRESSURE: 92 MMHG

## 2019-05-01 DIAGNOSIS — Z49.01 ENCOUNTER FOR FITTING AND ADJUSTMENT OF EXTRACORPOREAL DIALYSIS CATHETER: Chronic | ICD-10-CM

## 2019-05-01 DIAGNOSIS — L03.90 CELLULITIS, UNSPECIFIED: ICD-10-CM

## 2019-05-01 DIAGNOSIS — I77.0 ARTERIOVENOUS FISTULA, ACQUIRED: Chronic | ICD-10-CM

## 2019-05-01 LAB
ALBUMIN SERPL ELPH-MCNC: 3.8 G/DL — SIGNIFICANT CHANGE UP (ref 3.3–5.2)
ALP SERPL-CCNC: 154 U/L — HIGH (ref 40–120)
ALT FLD-CCNC: <5 U/L — SIGNIFICANT CHANGE UP
ANION GAP SERPL CALC-SCNC: 20 MMOL/L — HIGH (ref 5–17)
APTT BLD: 29.7 SEC — SIGNIFICANT CHANGE UP (ref 27.5–36.3)
AST SERPL-CCNC: 14 U/L — SIGNIFICANT CHANGE UP
BASOPHILS # BLD AUTO: 0 K/UL — SIGNIFICANT CHANGE UP (ref 0–0.2)
BASOPHILS NFR BLD AUTO: 0.2 % — SIGNIFICANT CHANGE UP (ref 0–2)
BILIRUB SERPL-MCNC: 0.8 MG/DL — SIGNIFICANT CHANGE UP (ref 0.4–2)
BUN SERPL-MCNC: 34 MG/DL — HIGH (ref 8–20)
CALCIUM SERPL-MCNC: 10.2 MG/DL — SIGNIFICANT CHANGE UP (ref 8.6–10.2)
CHLORIDE SERPL-SCNC: 87 MMOL/L — LOW (ref 98–107)
CO2 SERPL-SCNC: 25 MMOL/L — SIGNIFICANT CHANGE UP (ref 22–29)
CREAT SERPL-MCNC: 4.92 MG/DL — HIGH (ref 0.5–1.3)
EOSINOPHIL # BLD AUTO: 0.1 K/UL — SIGNIFICANT CHANGE UP (ref 0–0.5)
EOSINOPHIL NFR BLD AUTO: 1.1 % — SIGNIFICANT CHANGE UP (ref 0–6)
GLUCOSE BLDC GLUCOMTR-MCNC: 160 MG/DL — HIGH (ref 70–99)
GLUCOSE SERPL-MCNC: 222 MG/DL — HIGH (ref 70–115)
HCT VFR BLD CALC: 37.1 % — SIGNIFICANT CHANGE UP (ref 37–47)
HGB BLD-MCNC: 11.3 G/DL — LOW (ref 12–16)
INR BLD: 1.28 RATIO — HIGH (ref 0.88–1.16)
LACTATE BLDV-MCNC: 2 MMOL/L — SIGNIFICANT CHANGE UP (ref 0.5–2)
LYMPHOCYTES # BLD AUTO: 0.6 K/UL — LOW (ref 1–4.8)
LYMPHOCYTES # BLD AUTO: 11.4 % — LOW (ref 20–55)
MCHC RBC-ENTMCNC: 27.4 PG — SIGNIFICANT CHANGE UP (ref 27–31)
MCHC RBC-ENTMCNC: 30.5 G/DL — LOW (ref 32–36)
MCV RBC AUTO: 89.8 FL — SIGNIFICANT CHANGE UP (ref 81–99)
MONOCYTES # BLD AUTO: 0.4 K/UL — SIGNIFICANT CHANGE UP (ref 0–0.8)
MONOCYTES NFR BLD AUTO: 7.9 % — SIGNIFICANT CHANGE UP (ref 3–10)
NEUTROPHILS # BLD AUTO: 4.2 K/UL — SIGNIFICANT CHANGE UP (ref 1.8–8)
NEUTROPHILS NFR BLD AUTO: 79.4 % — HIGH (ref 37–73)
PLATELET # BLD AUTO: 247 K/UL — SIGNIFICANT CHANGE UP (ref 150–400)
POTASSIUM SERPL-MCNC: 5.1 MMOL/L — SIGNIFICANT CHANGE UP (ref 3.5–5.3)
POTASSIUM SERPL-SCNC: 5.1 MMOL/L — SIGNIFICANT CHANGE UP (ref 3.5–5.3)
PROT SERPL-MCNC: 8 G/DL — SIGNIFICANT CHANGE UP (ref 6.6–8.7)
PROTHROM AB SERPL-ACNC: 14.8 SEC — HIGH (ref 10–12.9)
RBC # BLD: 4.13 M/UL — LOW (ref 4.4–5.2)
RBC # FLD: 15.6 % — SIGNIFICANT CHANGE UP (ref 11–15.6)
SODIUM SERPL-SCNC: 132 MMOL/L — LOW (ref 135–145)
WBC # BLD: 5.3 K/UL — SIGNIFICANT CHANGE UP (ref 4.8–10.8)
WBC # FLD AUTO: 5.3 K/UL — SIGNIFICANT CHANGE UP (ref 4.8–10.8)

## 2019-05-01 PROCEDURE — 93010 ELECTROCARDIOGRAM REPORT: CPT

## 2019-05-01 PROCEDURE — 99285 EMERGENCY DEPT VISIT HI MDM: CPT

## 2019-05-01 PROCEDURE — 73590 X-RAY EXAM OF LOWER LEG: CPT | Mod: 26,LT

## 2019-05-01 PROCEDURE — 93971 EXTREMITY STUDY: CPT | Mod: 26,LT

## 2019-05-01 PROCEDURE — 99223 1ST HOSP IP/OBS HIGH 75: CPT | Mod: GC

## 2019-05-01 RX ORDER — CEFAZOLIN SODIUM 1 G
2000 VIAL (EA) INJECTION ONCE
Qty: 0 | Refills: 0 | Status: COMPLETED | OUTPATIENT
Start: 2019-05-01 | End: 2019-05-01

## 2019-05-01 RX ORDER — ASPIRIN/CALCIUM CARB/MAGNESIUM 324 MG
81 TABLET ORAL DAILY
Qty: 0 | Refills: 0 | Status: DISCONTINUED | OUTPATIENT
Start: 2019-05-02 | End: 2019-05-06

## 2019-05-01 RX ORDER — PANTOPRAZOLE SODIUM 20 MG/1
40 TABLET, DELAYED RELEASE ORAL
Qty: 0 | Refills: 0 | Status: DISCONTINUED | OUTPATIENT
Start: 2019-05-01 | End: 2019-05-06

## 2019-05-01 RX ORDER — SACUBITRIL AND VALSARTAN 24; 26 MG/1; MG/1
1 TABLET, FILM COATED ORAL DAILY
Qty: 0 | Refills: 0 | Status: DISCONTINUED | OUTPATIENT
Start: 2019-05-02 | End: 2019-05-06

## 2019-05-01 RX ORDER — CARVEDILOL PHOSPHATE 80 MG/1
12.5 CAPSULE, EXTENDED RELEASE ORAL EVERY 12 HOURS
Qty: 0 | Refills: 0 | Status: DISCONTINUED | OUTPATIENT
Start: 2019-05-01 | End: 2019-05-06

## 2019-05-01 RX ORDER — SENNA PLUS 8.6 MG/1
2 TABLET ORAL AT BEDTIME
Qty: 0 | Refills: 0 | Status: DISCONTINUED | OUTPATIENT
Start: 2019-05-01 | End: 2019-05-06

## 2019-05-01 RX ORDER — IBUPROFEN 200 MG
600 TABLET ORAL EVERY 8 HOURS
Qty: 0 | Refills: 0 | Status: DISCONTINUED | OUTPATIENT
Start: 2019-05-01 | End: 2019-05-06

## 2019-05-01 RX ORDER — ACETAMINOPHEN 500 MG
650 TABLET ORAL EVERY 6 HOURS
Qty: 0 | Refills: 0 | Status: DISCONTINUED | OUTPATIENT
Start: 2019-05-01 | End: 2019-05-06

## 2019-05-01 RX ORDER — AMLODIPINE BESYLATE 2.5 MG/1
2.5 TABLET ORAL DAILY
Qty: 0 | Refills: 0 | Status: DISCONTINUED | OUTPATIENT
Start: 2019-05-01 | End: 2019-05-06

## 2019-05-01 RX ORDER — INSULIN LISPRO 100/ML
VIAL (ML) SUBCUTANEOUS
Qty: 0 | Refills: 0 | Status: DISCONTINUED | OUTPATIENT
Start: 2019-05-01 | End: 2019-05-06

## 2019-05-01 RX ORDER — ACETAMINOPHEN 500 MG
975 TABLET ORAL ONCE
Qty: 0 | Refills: 0 | Status: COMPLETED | OUTPATIENT
Start: 2019-05-01 | End: 2019-05-01

## 2019-05-01 RX ORDER — SACCHAROMYCES BOULARDII 250 MG
250 POWDER IN PACKET (EA) ORAL
Qty: 0 | Refills: 0 | Status: DISCONTINUED | OUTPATIENT
Start: 2019-05-01 | End: 2019-05-06

## 2019-05-01 RX ORDER — GLUCAGON INJECTION, SOLUTION 0.5 MG/.1ML
1 INJECTION, SOLUTION SUBCUTANEOUS ONCE
Qty: 0 | Refills: 0 | Status: DISCONTINUED | OUTPATIENT
Start: 2019-05-01 | End: 2019-05-06

## 2019-05-01 RX ORDER — INSULIN LISPRO 100/ML
VIAL (ML) SUBCUTANEOUS AT BEDTIME
Qty: 0 | Refills: 0 | Status: DISCONTINUED | OUTPATIENT
Start: 2019-05-01 | End: 2019-05-06

## 2019-05-01 RX ORDER — HEPARIN SODIUM 5000 [USP'U]/ML
5000 INJECTION INTRAVENOUS; SUBCUTANEOUS EVERY 12 HOURS
Qty: 0 | Refills: 0 | Status: DISCONTINUED | OUTPATIENT
Start: 2019-05-01 | End: 2019-05-06

## 2019-05-01 RX ORDER — SODIUM CHLORIDE 9 MG/ML
1000 INJECTION, SOLUTION INTRAVENOUS
Qty: 0 | Refills: 0 | Status: DISCONTINUED | OUTPATIENT
Start: 2019-05-01 | End: 2019-05-06

## 2019-05-01 RX ORDER — DEXTROSE 50 % IN WATER 50 %
15 SYRINGE (ML) INTRAVENOUS ONCE
Qty: 0 | Refills: 0 | Status: DISCONTINUED | OUTPATIENT
Start: 2019-05-01 | End: 2019-05-06

## 2019-05-01 RX ORDER — DEXTROSE 50 % IN WATER 50 %
25 SYRINGE (ML) INTRAVENOUS ONCE
Qty: 0 | Refills: 0 | Status: DISCONTINUED | OUTPATIENT
Start: 2019-05-01 | End: 2019-05-06

## 2019-05-01 RX ORDER — CALCITRIOL 0.5 UG/1
0.25 CAPSULE ORAL DAILY
Qty: 0 | Refills: 0 | Status: DISCONTINUED | OUTPATIENT
Start: 2019-05-01 | End: 2019-05-06

## 2019-05-01 RX ORDER — CALCIUM CARBONATE 500(1250)
1 TABLET ORAL THREE TIMES A DAY
Qty: 0 | Refills: 0 | Status: DISCONTINUED | OUTPATIENT
Start: 2019-05-01 | End: 2019-05-06

## 2019-05-01 RX ORDER — INSULIN GLARGINE 100 [IU]/ML
4 INJECTION, SOLUTION SUBCUTANEOUS AT BEDTIME
Qty: 0 | Refills: 0 | Status: DISCONTINUED | OUTPATIENT
Start: 2019-05-01 | End: 2019-05-06

## 2019-05-01 RX ORDER — CEFAZOLIN SODIUM 1 G
1000 VIAL (EA) INJECTION EVERY 8 HOURS
Qty: 0 | Refills: 0 | Status: DISCONTINUED | OUTPATIENT
Start: 2019-05-01 | End: 2019-05-02

## 2019-05-01 RX ORDER — DOCUSATE SODIUM 100 MG
100 CAPSULE ORAL DAILY
Qty: 0 | Refills: 0 | Status: DISCONTINUED | OUTPATIENT
Start: 2019-05-01 | End: 2019-05-06

## 2019-05-01 RX ORDER — DEXTROSE 50 % IN WATER 50 %
12.5 SYRINGE (ML) INTRAVENOUS ONCE
Qty: 0 | Refills: 0 | Status: DISCONTINUED | OUTPATIENT
Start: 2019-05-01 | End: 2019-05-06

## 2019-05-01 RX ORDER — HYDROMORPHONE HYDROCHLORIDE 2 MG/ML
0.5 INJECTION INTRAMUSCULAR; INTRAVENOUS; SUBCUTANEOUS EVERY 6 HOURS
Qty: 0 | Refills: 0 | Status: DISCONTINUED | OUTPATIENT
Start: 2019-05-01 | End: 2019-05-04

## 2019-05-01 RX ADMIN — CARVEDILOL PHOSPHATE 12.5 MILLIGRAM(S): 80 CAPSULE, EXTENDED RELEASE ORAL at 23:56

## 2019-05-01 RX ADMIN — SENNA PLUS 2 TABLET(S): 8.6 TABLET ORAL at 23:43

## 2019-05-01 RX ADMIN — Medication 975 MILLIGRAM(S): at 21:22

## 2019-05-01 RX ADMIN — INSULIN GLARGINE 4 UNIT(S): 100 INJECTION, SOLUTION SUBCUTANEOUS at 23:56

## 2019-05-01 RX ADMIN — Medication 975 MILLIGRAM(S): at 22:50

## 2019-05-01 RX ADMIN — Medication 100 MILLIGRAM(S): at 22:02

## 2019-05-01 NOTE — H&P ADULT - NSICDXPASTMEDICALHX_GEN_ALL_CORE_FT
PAST MEDICAL HISTORY:  Anemia due to chronic kidney disease, unspecified CKD stage     Chronic systolic congestive heart failure     Coronary artery disease, angina presence unspecified, unspecified vessel or lesion type, unspecified whether native or transplanted heart     DM (diabetes mellitus)     End stage renal disease on dialysis     HLD (hyperlipidemia)     HTN (hypertension)     Paroxysmal atrial fibrillation     Pericardial effusion     Pleural effusion

## 2019-05-01 NOTE — ED ADULT TRIAGE NOTE - CHIEF COMPLAINT QUOTE
Pt with left lower leg pain since last week. Was seen here and given medication but was told ot come back to r/o DVT. Pt is also due for dialysis today.

## 2019-05-01 NOTE — ED PROVIDER NOTE - OBJECTIVE STATEMENT
The patient is a 58 year old female with history of DM, HTN, CAD, ESRD on HD presents with LLE swelling

## 2019-05-01 NOTE — ED ADULT NURSE REASSESSMENT NOTE - NS ED NURSE REASSESS COMMENT FT1
Dr Mcgrath  renal MD at bedside Dr Mcgrath  renal MD at bedside consent for dialysis signed and scanned into chart

## 2019-05-01 NOTE — H&P ADULT - NSHPSOCIALHISTORY_GEN_ALL_CORE
- Denies EtOH, tobacco and illicit drug use.  - Currently un employed  - Lives with her kids at home

## 2019-05-01 NOTE — ED PROVIDER NOTE - CLINICAL SUMMARY MEDICAL DECISION MAKING FREE TEXT BOX
The patient presents with LLE swelling and pain concerning for DVT vs cellulitis and missed HD and will admit for further evaluation

## 2019-05-01 NOTE — H&P ADULT - ASSESSMENT
59 yo female with past medical history of ESRD on HD (MWF) , chronic HFrEF (EF 35-40% TTE 02/11/19), NICM (cardiac cath 03/18 non-obstructive CAD), paroxysmal Afib (no AC due to GI bleed), uremic pericarditis s/p pericardiocentesis, right sided pleural effusion s/p pigtail catheter, IDDM, HTN, Ascities s/p paracentesis on 02/2019, who presented with chief complain of left lower extremity pain and swelling. Patient will be admitted with diagnosis of cellulitis.    PROBLEMS    Left lower extremity cellulitis  - Partially treated as outpatient with Levofloxacin x 7 days, still presenting with erythema, pain and swelling.  - S/p Cefazolin 2g IV in ED, will change to Ceftriaxone 1 gr IV.  - Xray showed no signs of OM  - Doppler US r/o DVT. Showed baker cyst.  - Acetaminophen 1g STAT, Tylenol 650 mg q6 PRN mild pain / Fever, Ibuprofen 600 q 8hours PRN moderate pain  - F/u CBC, cmp in AM    ESRD on HD MWF  - Patient missed HD on 5/1, will c/w HD as scheduled.  - via Left AVF   - Nephro recs noted and appreciated.  - Diet with renal restriction /DASH/ tlc    Insulin dependent T2DM  - C/w Lantus 4 Units at bedtime.  - Will hold Humalog premeals for now.  - Low dose SS  - Hypoglycemia protocol  - F/u A1c in am    HTN  - Elevated SBP noted on admission  - F/u BP after HD as patient missed a session.   - Will restart home meds amlodipine and coreg.    Chronic combined HF  - ef 35-40%, grade 2 diastolic dysfunction.   - Non obstructive CAD by cath in 3/2018  - Strict I/o  - daily weight   - c/w Home meds coreg po bid   - c/w offloading fluid with HD, sched per renal    Chronic constipation  - Last BM 2 days ago.   - c/w home meds Senna and colace.     Prophylactic measure:  - DVT prophylaxis: Heparin 5000 Units SQ q12 h  - Florastor while on antibiotics.   - c/w Pantoprazole daily 57 yo female with past medical history of ESRD on HD (MWF) , chronic HFrEF (EF 35-40% TTE 02/11/19), NICM (cardiac cath 03/18 non-obstructive CAD), paroxysmal Afib (no AC due to GI bleed), uremic pericarditis s/p pericardiocentesis, right sided pleural effusion s/p pigtail catheter, IDDM, HTN, Ascities s/p paracentesis on 02/2019, who presented with chief complain of left lower extremity pain and swelling. Patient will be admitted with diagnosis of cellulitis.    PROBLEMS    Left lower extremity cellulitis  - Admit to any bed.   - Partially treated as outpatient with Levofloxacin x 7 days, still presenting with erythema, pain and swelling.  - No leucocytosis probably 2/2 recent ATB treatment.  - S/p Cefazolin 2g IV in ED, will change to Ceftriaxone 1 gr IV.  - Xray showed no signs of OM  - Doppler US r/o DVT. Showed baker cyst.  - Acetaminophen 1g STAT, Tylenol 650 mg q6 PRN mild pain / Fever, Ibuprofen 600 q 8hours PRN moderate pain  - F/u CBC, cmp in AM    ESRD on HD MWF  - Patient missed HD on 5/1, will c/w HD as scheduled.  - via Left AVF   - Nephro recs noted and appreciated.  - Diet with renal restriction /DASH/ tlc    Insulin dependent T2DM  - C/w Lantus 4 Units at bedtime.  - Will hold Humalog premeals for now.  - Low dose SS  - Hypoglycemia protocol  - F/u A1c in am    HTN  - Elevated SBP noted on admission  - F/u BP after HD as patient missed a session.   - Will restart home meds amlodipine and coreg.    Hyponatremia  -Probably pseudo 2/2 hyperglycemia.  - F/u CMP in am.     Chronic combined HF  - ef 35-40%, grade 2 diastolic dysfunction.   - Non obstructive CAD by cath in 3/2018  - Strict I/o  - daily weight   - c/w Home meds coreg po bid   - c/w offloading fluid with HD, sched per renal    Chronic constipation  - Last BM 2 days ago.   - c/w home meds Senna and colace.     Prophylactic measure:  - DVT prophylaxis: Heparin 5000 Units SQ q12 h  - Florastor while on antibiotics.   - c/w Pantoprazole daily 59 yo female with past medical history of ESRD on HD (MWF) , chronic HFrEF (EF 35-40% TTE 02/11/19), NICM (cardiac cath 03/18 non-obstructive CAD), paroxysmal Afib (no AC due to GI bleed), uremic pericarditis s/p pericardiocentesis, right sided pleural effusion s/p pigtail catheter, IDDM, HTN, Ascities s/p paracentesis on 02/2019, who presented with chief complain of left lower extremity pain and swelling. Patient will be admitted with diagnosis of cellulitis.    PROBLEMS    Left lower extremity cellulitis  - Admit to any bed.   - Partially treated as outpatient with Levofloxacin x 7 days, still presenting with erythema, pain and swelling.  - No leucocytosis probably 2/2 recent ATB treatment.  - S/p Cefazolin 2g IV in ED, will change to Ceftriaxone 1 gr IV.  - Xray showed no signs of OM  - Doppler US r/o DVT. Showed baker cyst.  - Acetaminophen 1g STAT, Tylenol 650 mg q6 PRN mild pain / Fever, Ibuprofen 600 q 8hours PRN moderate pain  - Dilaudid 0.5 mg IV q6h PRN severe pain, will consider increasing dose if needed.    - F/u CBC, cmp in AM    ESRD on HD MWF  - Patient missed HD on 5/1, will c/w HD as scheduled.  - via Left AVF   - Nephro recs noted and appreciated.  - Diet with renal restriction /DASH/ tlc    Insulin dependent T2DM  - C/w Lantus 4 Units at bedtime.  - Will hold Humalog premeals for now.  - Low dose SS  - Hypoglycemia protocol  - F/u A1c in am    HTN  - Elevated SBP noted on admission  - F/u BP after HD as patient missed a session.   - Will restart home meds amlodipine and coreg.    Hyponatremia  -Probably pseudo 2/2 hyperglycemia.  - F/u CMP in am.     Chronic combined HF  - ef 35-40%, grade 2 diastolic dysfunction.   - Non obstructive CAD by cath in 3/2018  - Strict I/o  - daily weight   - c/w Home meds coreg po bid   - c/w offloading fluid with HD, sched per renal    Chronic constipation  - Last BM 2 days ago.   - c/w home meds Senna and colace.     Prophylactic measure:  - DVT prophylaxis: Heparin 5000 Units SQ q12 h  - Florastor while on antibiotics.   - c/w Pantoprazole daily 57 yo female with past medical history of ESRD on HD (MWF) , chronic HFrEF (EF 35-40% TTE 02/11/19), NICM (cardiac cath 03/18 non-obstructive CAD), paroxysmal Afib (no AC due to GI bleed), uremic pericarditis s/p pericardiocentesis, right sided pleural effusion s/p pigtail catheter, IDDM, HTN, Ascities s/p paracentesis on 02/2019, who presented with chief complain of left lower extremity pain and swelling. Patient will be admitted with diagnosis of cellulitis.    PROBLEMS    Left lower extremity cellulitis  - Admit to any bed.   - Partially treated as outpatient with Levofloxacin x 7 days, still presenting with erythema, pain and swelling.  - No leucocytosis probably 2/2 recent ATB treatment.  - S/p Cefazolin 2g IV in ED, will continue with Cefazolin 1 g q 8 hours.  - Doppler US r/o DVT. Showed baker cyst.  - Acetaminophen 1g STAT, Tylenol 650 mg q6 PRN mild pain / Fever, Ibuprofen 600 q 8hours PRN moderate pain  - Dilaudid 0.5 mg IV q6h PRN severe pain, will consider increasing dose if needed.  - Vascular surgery consult pending.   - F/u CBC, cmp in AM    ESRD on HD MWF  - Patient missed HD on 5/1, will c/w HD as scheduled.  - via Left AVF   - Nephro recs noted and appreciated.  - Diet with renal restriction /DASH/ tlc    Insulin dependent T2DM  - C/w Lantus 4 Units at bedtime.  - Will hold Humalog premeals for now.  - Low dose SS  - Hypoglycemia protocol  - F/u A1c in am    HTN  - Elevated SBP noted on admission  - F/u BP after HD as patient missed a session.   - Will restart home meds amlodipine and coreg.    Hyponatremia  -Probably pseudo 2/2 hyperglycemia.  - F/u CMP in am.     Chronic combined HF  - ef 35-40%, grade 2 diastolic dysfunction.   - Non obstructive CAD by cath in 3/2018  - Strict I/o  - daily weight   - c/w Home meds coreg po bid   - c/w offloading fluid with HD, sched per renal    Chronic constipation  - Last BM 2 days ago.   - c/w home meds Senna and colace.     Prophylactic measure:  - DVT prophylaxis: Heparin 5000 Units SQ q12 h  - Florastor while on antibiotics.   - c/w Pantoprazole daily

## 2019-05-01 NOTE — H&P ADULT - HISTORY OF PRESENT ILLNESS
57 yo female with hx of ESRD on HD MWF, chronic HFrEF (EF 35-40% TTE 02/11/19), NICM (cardiac cath 03/18 non-obstructive CAD), paroxysmal Afib (no AC due to GI bleed), uremic pericarditis s/p pericardiocentesis, right sided pleural effusion s/p pigtail catheter, IDDM, and HTN with recent hospitalization in [facility] 2/2 positive Influenza A complicated with right pleural effusion requiring thoracocentesis and discharged home on supplemental O2 3L/min ..... 59 yo female with past medical history of ESRD on HD (MWF) , chronic HFrEF (EF 35-40% TTE 02/11/19), NICM (cardiac cath 03/18 non-obstructive CAD), paroxysmal Afib (no AC due to GI bleed), uremic pericarditis s/p pericardiocentesis, right sided pleural effusion s/p pigtail catheter, IDDM, HTN, Ascities s/p paracentesis on 02/2019, who presents to the ED with chief complain of left lower extremity pain and swelling.    Patient states she started history of present illness 1 week prior to admission characterized for worsening pain on lateral aspect of left leg bellow the knee, as per patient initially 2/10 intensity now 9/10. The pain is pressure like- stabbing like, and radiates to toes and posterior aspect of the knee, concomitantly swelling of the leg, non quantified hyperthermia, nausea and 1 episode of vomiting. Patient visited PMD who indicated Levofloxacin x 7 days and PO NSAID without improvement of the symptoms.    In the ED patient was found to have Left LE significant swelling and tenderness over the lateral compartment, Doppler US showed evidence of baker cyst and R/o DVT. Patient was started on Cefazolin and will be admitted with diagnosis of cellulitis.    ROS: Patient states she has been presenting increase abdominal girth for the last week. Also, patient state she has not been using O2 regularly  except if SOB present. Patient denies chest pain, abdominal pain, diarrhea, SOB.

## 2019-05-01 NOTE — H&P ADULT - NSHPPHYSICALEXAM_GEN_ALL_CORE
T(C): 36.9 (01 May 2019 17:25), Max: 37.1 (01 May 2019 12:12)  T(F): 98.5 (01 May 2019 17:25), Max: 98.8 (01 May 2019 12:12)  HR: 80 (01 May 2019 17:25) (80 - 80)  BP: 170/90 (01 May 2019 17:25) (165/92 - 170/90)  RR: 16 (01 May 2019 17:25) (14 - 16)  SpO2: 96% (01 May 2019 17:25) (96% - 99%) T(C): 36.9 (01 May 2019 17:25), Max: 37.1 (01 May 2019 12:12)  T(F): 98.5 (01 May 2019 17:25), Max: 98.8 (01 May 2019 12:12)  HR: 80 (01 May 2019 17:25) (80 - 80)  BP: 170/90 (01 May 2019 17:25) (165/92 - 170/90)  RR: 16 (01 May 2019 17:25) (14 - 16)  SpO2: 96% (01 May 2019 17:25) (96% - 99%)    PHYSICAL EXAM:  - General: Patient examined during dialysis. Not in acute distress. Awake, Alert and oriented x 4. Mild facial pallor noted.   - HEENT: clear conjunctiva b/l, EOMI, PERRLA. Moist oral mucosa.   - NECK: Supple.  - Chest: No retractions. Lungs are clear to auscultation b/l.   - Heart: Regular rate and rhythm, no rub, murmur, or gallop. Capillary refill <2s  - Extremities: On Left lower extremity there is +edema on lower 2/3, non pitting, painful, warm to touch with erythema and not well-defined borders. Pulses are present b/l. No limitation to ROM observed. On Left arm +AVF.  - Abdomen: Soft, non tender, ascites present. No CVA tenderness.   - Skin: LLE, as mentioned above. No cyanosis, no rash.

## 2019-05-01 NOTE — H&P ADULT - NSICDXFAMILYHX_GEN_ALL_CORE_FT
FAMILY HISTORY:  Sibling  Still living? Unknown  Family history of kidney disease in brother, Age at diagnosis: Age Unknown

## 2019-05-01 NOTE — ED ADULT NURSE NOTE - OBJECTIVE STATEMENT
Pt arrives to ED c/o left lower leg pain since last week.  Pt state she was seen here and given pain medication with no relief and was told to come back if pain persisted and they would do additional testing (r/o DVT) . Pt states she is due for dialysis today, normally goes MWF. Has AV fistula is left arm, pos bruit/thrill. Pt denies SOB, is A & OX4.

## 2019-05-01 NOTE — ED STATDOCS - PROGRESS NOTE DETAILS
Patient is a 58 year old F with a PMHx of HTN, HLD, DM, CKD (on dialysis MWF, did not yet have dialysis today and is due) and CAD who presents to the ED for evaluation of LLE pain x about one month.  No falls or accidents.  Patient was here at [facility] on 4/28; was just medicated and discharged.  Exam: Left LE significant swelling and tenderness over the lateral compartment.  Warm to the touch.  Patient will be sent to the main ED for further evaluation by another provider.  Initial orders placed.  : Frank

## 2019-05-01 NOTE — ED PROVIDER NOTE - CARE PLAN
Principal Discharge DX:	Cellulitis  Secondary Diagnosis:	DM (diabetes mellitus)  Secondary Diagnosis:	HLD (hyperlipidemia)  Secondary Diagnosis:	HTN (hypertension)

## 2019-05-01 NOTE — PROGRESS NOTE ADULT - ASSESSMENT
ESRD   Missed HD   Will arrange HD tonite   Seed orders     Anemia - Hgb stable     Left leg swelling   X ray negative   Leg doppler w/o DVT   Suspect Cellulitis   Add Ancef

## 2019-05-01 NOTE — H&P ADULT - NSHPLABSRESULTS_GEN_ALL_CORE
11.3   5.3   )-----------( 247      ( 01 May 2019 13:17 )             37.1     05-01    132<L>  |  87<L>  |  34.0<H>  ----------------------------<  222<H>  5.1   |  25.0  |  4.92<H>    Ca    10.2      01 May 2019 14:03    TPro  8.0  /  Alb  3.8  /  TBili  0.8  /  DBili  x   /  AST  14  /  ALT  <5  /  AlkPhos  154<H>  05-01        Xray Tibia + Fibula 2 Views, Left (05.01.19 @ 16:27) >    FINDINGS:  No prior studies are available for review.    The tibia and fibula appear intact.   No fracture is seen.   No soft   tissue abnormalities are seen.    IMPRESSION :   No acute radiographic osseous pathology..    < end of copied text >      US Duplex Venous Lower Ext Ltd, Left (05.01.19 @ 13:52) >  IMPRESSION:   No evidence of acute left femoropopliteal deep venous thrombosis.    Left Baker's cyst, as described above.    < end of copied text >

## 2019-05-02 LAB
ALBUMIN SERPL ELPH-MCNC: 3.5 G/DL — SIGNIFICANT CHANGE UP (ref 3.3–5.2)
ALP SERPL-CCNC: 134 U/L — HIGH (ref 40–120)
ALT FLD-CCNC: <5 U/L — SIGNIFICANT CHANGE UP
ANION GAP SERPL CALC-SCNC: 18 MMOL/L — HIGH (ref 5–17)
ANISOCYTOSIS BLD QL: SLIGHT — SIGNIFICANT CHANGE UP
AST SERPL-CCNC: 11 U/L — SIGNIFICANT CHANGE UP
BILIRUB SERPL-MCNC: 0.8 MG/DL — SIGNIFICANT CHANGE UP (ref 0.4–2)
BUN SERPL-MCNC: 21 MG/DL — HIGH (ref 8–20)
CALCIUM SERPL-MCNC: 9.5 MG/DL — SIGNIFICANT CHANGE UP (ref 8.6–10.2)
CHLORIDE SERPL-SCNC: 93 MMOL/L — LOW (ref 98–107)
CO2 SERPL-SCNC: 26 MMOL/L — SIGNIFICANT CHANGE UP (ref 22–29)
CREAT SERPL-MCNC: 3.33 MG/DL — HIGH (ref 0.5–1.3)
ELLIPTOCYTES BLD QL SMEAR: SLIGHT — SIGNIFICANT CHANGE UP
GLUCOSE BLDC GLUCOMTR-MCNC: 143 MG/DL — HIGH (ref 70–99)
GLUCOSE BLDC GLUCOMTR-MCNC: 156 MG/DL — HIGH (ref 70–99)
GLUCOSE BLDC GLUCOMTR-MCNC: 172 MG/DL — HIGH (ref 70–99)
GLUCOSE BLDC GLUCOMTR-MCNC: 91 MG/DL — SIGNIFICANT CHANGE UP (ref 70–99)
GLUCOSE SERPL-MCNC: 189 MG/DL — HIGH (ref 70–115)
HBA1C BLD-MCNC: 7.6 % — HIGH (ref 4–5.6)
HCT VFR BLD CALC: 37.3 % — SIGNIFICANT CHANGE UP (ref 37–47)
HGB BLD-MCNC: 11.2 G/DL — LOW (ref 12–16)
HYPOCHROMIA BLD QL: SLIGHT — SIGNIFICANT CHANGE UP
LYMPHOCYTES # BLD AUTO: 10 % — LOW (ref 20–55)
MACROCYTES BLD QL: SLIGHT — SIGNIFICANT CHANGE UP
MCHC RBC-ENTMCNC: 27.1 PG — SIGNIFICANT CHANGE UP (ref 27–31)
MCHC RBC-ENTMCNC: 30 G/DL — LOW (ref 32–36)
MCV RBC AUTO: 90.3 FL — SIGNIFICANT CHANGE UP (ref 81–99)
MICROCYTES BLD QL: SLIGHT — SIGNIFICANT CHANGE UP
MONOCYTES NFR BLD AUTO: 5 % — SIGNIFICANT CHANGE UP (ref 3–10)
NEUTROPHILS # BLD AUTO: 1 K/UL — LOW (ref 1.8–8)
NEUTROPHILS NFR BLD AUTO: 85 % — HIGH (ref 37–73)
PLAT MORPH BLD: NORMAL — SIGNIFICANT CHANGE UP
PLATELET # BLD AUTO: 244 K/UL — SIGNIFICANT CHANGE UP (ref 150–400)
POIKILOCYTOSIS BLD QL AUTO: SLIGHT — SIGNIFICANT CHANGE UP
POLYCHROMASIA BLD QL SMEAR: SLIGHT — SIGNIFICANT CHANGE UP
POTASSIUM SERPL-MCNC: 4.2 MMOL/L — SIGNIFICANT CHANGE UP (ref 3.5–5.3)
POTASSIUM SERPL-SCNC: 4.2 MMOL/L — SIGNIFICANT CHANGE UP (ref 3.5–5.3)
PROT SERPL-MCNC: 7.6 G/DL — SIGNIFICANT CHANGE UP (ref 6.6–8.7)
RBC # BLD: 4.13 M/UL — LOW (ref 4.4–5.2)
RBC # FLD: 15.5 % — SIGNIFICANT CHANGE UP (ref 11–15.6)
RBC BLD AUTO: ABNORMAL
SODIUM SERPL-SCNC: 137 MMOL/L — SIGNIFICANT CHANGE UP (ref 135–145)
WBC # BLD: 5.1 K/UL — SIGNIFICANT CHANGE UP (ref 4.8–10.8)
WBC # FLD AUTO: 5.1 K/UL — SIGNIFICANT CHANGE UP (ref 4.8–10.8)

## 2019-05-02 PROCEDURE — 99232 SBSQ HOSP IP/OBS MODERATE 35: CPT

## 2019-05-02 RX ORDER — CEFAZOLIN SODIUM 1 G
1000 VIAL (EA) INJECTION EVERY 24 HOURS
Qty: 0 | Refills: 0 | Status: DISCONTINUED | OUTPATIENT
Start: 2019-05-02 | End: 2019-05-03

## 2019-05-02 RX ORDER — ONDANSETRON 8 MG/1
4 TABLET, FILM COATED ORAL ONCE
Qty: 0 | Refills: 0 | Status: COMPLETED | OUTPATIENT
Start: 2019-05-02 | End: 2019-05-02

## 2019-05-02 RX ADMIN — Medication 1: at 17:01

## 2019-05-02 RX ADMIN — HYDROMORPHONE HYDROCHLORIDE 0.5 MILLIGRAM(S): 2 INJECTION INTRAMUSCULAR; INTRAVENOUS; SUBCUTANEOUS at 15:39

## 2019-05-02 RX ADMIN — HEPARIN SODIUM 5000 UNIT(S): 5000 INJECTION INTRAVENOUS; SUBCUTANEOUS at 04:53

## 2019-05-02 RX ADMIN — Medication 600 MILLIGRAM(S): at 13:26

## 2019-05-02 RX ADMIN — SENNA PLUS 2 TABLET(S): 8.6 TABLET ORAL at 21:25

## 2019-05-02 RX ADMIN — AMLODIPINE BESYLATE 2.5 MILLIGRAM(S): 2.5 TABLET ORAL at 04:53

## 2019-05-02 RX ADMIN — HYDROMORPHONE HYDROCHLORIDE 0.5 MILLIGRAM(S): 2 INJECTION INTRAMUSCULAR; INTRAVENOUS; SUBCUTANEOUS at 15:24

## 2019-05-02 RX ADMIN — Medication 1: at 12:25

## 2019-05-02 RX ADMIN — ONDANSETRON 4 MILLIGRAM(S): 8 TABLET, FILM COATED ORAL at 18:10

## 2019-05-02 RX ADMIN — Medication 600 MILLIGRAM(S): at 12:26

## 2019-05-02 RX ADMIN — PANTOPRAZOLE SODIUM 40 MILLIGRAM(S): 20 TABLET, DELAYED RELEASE ORAL at 10:14

## 2019-05-02 RX ADMIN — Medication 250 MILLIGRAM(S): at 04:53

## 2019-05-02 RX ADMIN — Medication 100 MILLIGRAM(S): at 06:26

## 2019-05-02 RX ADMIN — CARVEDILOL PHOSPHATE 12.5 MILLIGRAM(S): 80 CAPSULE, EXTENDED RELEASE ORAL at 17:01

## 2019-05-02 RX ADMIN — CARVEDILOL PHOSPHATE 12.5 MILLIGRAM(S): 80 CAPSULE, EXTENDED RELEASE ORAL at 04:52

## 2019-05-02 RX ADMIN — Medication 100 MILLIGRAM(S): at 15:17

## 2019-05-02 RX ADMIN — HEPARIN SODIUM 5000 UNIT(S): 5000 INJECTION INTRAVENOUS; SUBCUTANEOUS at 17:00

## 2019-05-02 RX ADMIN — INSULIN GLARGINE 4 UNIT(S): 100 INJECTION, SOLUTION SUBCUTANEOUS at 21:24

## 2019-05-02 RX ADMIN — Medication 1 TABLET(S): at 04:53

## 2019-05-02 RX ADMIN — Medication 250 MILLIGRAM(S): at 17:00

## 2019-05-02 RX ADMIN — Medication 1 TABLET(S): at 12:25

## 2019-05-02 RX ADMIN — Medication 100 MILLIGRAM(S): at 12:26

## 2019-05-02 RX ADMIN — CALCITRIOL 0.25 MICROGRAM(S): 0.5 CAPSULE ORAL at 15:10

## 2019-05-02 RX ADMIN — Medication 1 TABLET(S): at 21:25

## 2019-05-02 NOTE — PROGRESS NOTE ADULT - ASSESSMENT
59 yo female with past medical history of ESRD on HD (MWF) , chronic HFrEF (EF 35-40% TTE 02/11/19), NICM (cardiac cath 03/18 non-obstructive CAD), paroxysmal Afib (no AC due to GI bleed), uremic pericarditis s/p pericardiocentesis, right sided pleural effusion s/p pigtail catheter, IDDM, HTN, Ascities s/p paracentesis on 02/2019, who presents to the ED with chief complain of left lower extremity pain and swelling. Now here with cellulitis of the left leg.    Left lower extremity cellulitis  - Admit to any bed.  - Partially treated as outpatient with Levofloxacin x 7 days, still presenting with erythema, pain and swelling.  - No leucocytosis probably 2/2 recent ATB treatment.  - S/p Cefazolin 2g IV in ED, will continue with Cefazolin 1 g q 8 hours.  - Doppler US r/o DVT. Showed baker cyst.  - Acetaminophen 1g STAT, Tylenol 650 mg q6 PRN mild pain / Fever, Ibuprofen 600 q 8hours PRN moderate pain  - Dilaudid 0.5 mg IV q6h PRN severe pain, will consider increasing dose if needed.  - Vascular surgery consult pending.  - CALEB ordered    ESRD on HD MWF  - Patient missed HD on 5/1, will c/w HD as scheduled.  - via Left AVF   - Nephro recs noted and appreciated.  - Diet with renal restriction /DASH/ tlc    Insulin dependent T2DM  - C/w Lantus 4 Units at bedtime.  - Will hold Humalog premeals for now.  - Low dose SS  - Hypoglycemia protocol  - F/u A1c in am    HTN  - Elevated SBP noted on admission  - F/u BP after HD as patient missed a session.   - Will restart home meds amlodipine and coreg.    Hyponatremia  -Probably pseudo 2/2 hyperglycemia.  - F/u CMP in am.     Chronic combined HF  - ef 35-40%, grade 2 diastolic dysfunction.   - Non obstructive CAD by cath in 3/2018  - Strict I/o  - daily weight   - c/w Home meds coreg po bid   - c/w offloading fluid with HD, sched per renal    Chronic constipation  - Last BM 2 days ago.   - c/w home meds Senna and colace.     Prophylactic measure:  - DVT prophylaxis: Heparin 5000 Units SQ q12 h  - Florastor while on antibiotics.   - c/w Pantoprazole daily

## 2019-05-02 NOTE — PROGRESS NOTE ADULT - ASSESSMENT
ESRD   Will arrange HD tomorrow       Anemia - Hgb stable     Left leg swelling   X ray negative   Leg doppler w/o DVT   Suspect Cellulitis   Clinically improved with Ancef ESRD   Will arrange HD tomorrow       Anemia - Hgb stable     Left leg swelling   X ray negative   Leg doppler w/o DVT   Suspect Cellulitis   Clinically improved with Ancef ( change to ESRD dosing )

## 2019-05-03 LAB
GLUCOSE BLDC GLUCOMTR-MCNC: 114 MG/DL — HIGH (ref 70–99)
GLUCOSE BLDC GLUCOMTR-MCNC: 147 MG/DL — HIGH (ref 70–99)
GLUCOSE BLDC GLUCOMTR-MCNC: 160 MG/DL — HIGH (ref 70–99)
GLUCOSE BLDC GLUCOMTR-MCNC: 163 MG/DL — HIGH (ref 70–99)

## 2019-05-03 PROCEDURE — 93923 UPR/LXTR ART STDY 3+ LVLS: CPT | Mod: 26

## 2019-05-03 PROCEDURE — 99232 SBSQ HOSP IP/OBS MODERATE 35: CPT

## 2019-05-03 RX ORDER — VANCOMYCIN HCL 1 G
1000 VIAL (EA) INTRAVENOUS ONCE
Qty: 0 | Refills: 0 | Status: COMPLETED | OUTPATIENT
Start: 2019-05-03 | End: 2019-05-04

## 2019-05-03 RX ORDER — GABAPENTIN 400 MG/1
300 CAPSULE ORAL ONCE
Qty: 0 | Refills: 0 | Status: COMPLETED | OUTPATIENT
Start: 2019-05-03 | End: 2019-05-04

## 2019-05-03 RX ADMIN — Medication 250 MILLIGRAM(S): at 16:47

## 2019-05-03 RX ADMIN — AMLODIPINE BESYLATE 2.5 MILLIGRAM(S): 2.5 TABLET ORAL at 05:32

## 2019-05-03 RX ADMIN — Medication 1 TABLET(S): at 05:32

## 2019-05-03 RX ADMIN — Medication 1: at 16:47

## 2019-05-03 RX ADMIN — CARVEDILOL PHOSPHATE 12.5 MILLIGRAM(S): 80 CAPSULE, EXTENDED RELEASE ORAL at 16:47

## 2019-05-03 RX ADMIN — HEPARIN SODIUM 5000 UNIT(S): 5000 INJECTION INTRAVENOUS; SUBCUTANEOUS at 05:33

## 2019-05-03 RX ADMIN — CARVEDILOL PHOSPHATE 12.5 MILLIGRAM(S): 80 CAPSULE, EXTENDED RELEASE ORAL at 05:32

## 2019-05-03 RX ADMIN — PANTOPRAZOLE SODIUM 40 MILLIGRAM(S): 20 TABLET, DELAYED RELEASE ORAL at 05:34

## 2019-05-03 RX ADMIN — Medication 81 MILLIGRAM(S): at 13:31

## 2019-05-03 RX ADMIN — SACUBITRIL AND VALSARTAN 1 TABLET(S): 24; 26 TABLET, FILM COATED ORAL at 05:32

## 2019-05-03 RX ADMIN — Medication 250 MILLIGRAM(S): at 05:33

## 2019-05-03 RX ADMIN — INSULIN GLARGINE 4 UNIT(S): 100 INJECTION, SOLUTION SUBCUTANEOUS at 21:06

## 2019-05-03 RX ADMIN — HYDROMORPHONE HYDROCHLORIDE 0.5 MILLIGRAM(S): 2 INJECTION INTRAMUSCULAR; INTRAVENOUS; SUBCUTANEOUS at 03:43

## 2019-05-03 RX ADMIN — Medication 100 MILLIGRAM(S): at 13:31

## 2019-05-03 RX ADMIN — CALCITRIOL 0.25 MICROGRAM(S): 0.5 CAPSULE ORAL at 13:31

## 2019-05-03 RX ADMIN — HEPARIN SODIUM 5000 UNIT(S): 5000 INJECTION INTRAVENOUS; SUBCUTANEOUS at 16:46

## 2019-05-03 RX ADMIN — Medication 1 TABLET(S): at 21:06

## 2019-05-03 RX ADMIN — Medication 1 TABLET(S): at 13:31

## 2019-05-03 RX ADMIN — HYDROMORPHONE HYDROCHLORIDE 0.5 MILLIGRAM(S): 2 INJECTION INTRAMUSCULAR; INTRAVENOUS; SUBCUTANEOUS at 11:17

## 2019-05-03 RX ADMIN — HYDROMORPHONE HYDROCHLORIDE 0.5 MILLIGRAM(S): 2 INJECTION INTRAMUSCULAR; INTRAVENOUS; SUBCUTANEOUS at 10:51

## 2019-05-03 NOTE — PROGRESS NOTE ADULT - ASSESSMENT
59 yo female with past medical history of ESRD on HD (MWF) , chronic HFrEF (EF 35-40% TTE 02/11/19), NICM (cardiac cath 03/18 non-obstructive CAD), paroxysmal Afib (no AC due to GI bleed), uremic pericarditis s/p pericardiocentesis, right sided pleural effusion s/p pigtail catheter, IDDM, HTN, Ascities s/p paracentesis on 02/2019, who presents to the ED with chief complain of left lower extremity pain and swelling. Now here with cellulitis of the left leg.    Left lower extremity cellulitis  - Admit to any bed.  - Partially treated as outpatient with Levofloxacin x 7 days, still presenting with erythema, pain and swelling.  - change to vancomycin today from cefazolin  - Doppler US r/o DVT. Showed baker cyst.  - Dilaudid 0.5 mg IV q6h PRN severe pain, will consider increasing dose if needed.  - CALEB ordered  - can give a dose of gabapentin after dialysis today    ESRD on HD MWF  - Patient missed HD on 5/1, will c/w HD as scheduled.  - via Left AVF   - Nephro recs noted and appreciated.  - Diet with renal restriction /DASH/ tlc    Insulin dependent T2DM  - C/w Lantus 4 Units at bedtime.  - Will hold Humalog premeals for now.  - Low dose SS  - Hypoglycemia protocol  - F/u A1c in am    HTN  - Elevated SBP noted on admission  - F/u BP after HD as patient missed a session.   - Will restart home meds amlodipine and coreg.    Hyponatremia  -Probably pseudo 2/2 hyperglycemia.  - F/u CMP in am.     Chronic combined HF  - ef 35-40%, grade 2 diastolic dysfunction.   - Non obstructive CAD by cath in 3/2018  - Strict I/o  - daily weight   - c/w Home meds coreg po bid   - c/w offloading fluid with HD, sched per renal    Chronic constipation  - Last BM 2 days ago.   - c/w home meds Senna and colace.     Prophylactic measure:  - DVT prophylaxis: Heparin 5000 Units SQ q12 h  - Florastor while on antibiotics.   - c/w Pantoprazole daily

## 2019-05-03 NOTE — PROGRESS NOTE ADULT - ASSESSMENT
ESRD:  - HD today (MWF)     Anemia - Hgb stable   - no need for MARGI now    RO: will check serum phos  - cont Calcitriol and CaCO3

## 2019-05-04 LAB
GLUCOSE BLDC GLUCOMTR-MCNC: 131 MG/DL — HIGH (ref 70–99)
GLUCOSE BLDC GLUCOMTR-MCNC: 167 MG/DL — HIGH (ref 70–99)
GLUCOSE BLDC GLUCOMTR-MCNC: 224 MG/DL — HIGH (ref 70–99)
GLUCOSE BLDC GLUCOMTR-MCNC: 243 MG/DL — HIGH (ref 70–99)

## 2019-05-04 PROCEDURE — 99232 SBSQ HOSP IP/OBS MODERATE 35: CPT

## 2019-05-04 RX ORDER — OXYCODONE HYDROCHLORIDE 5 MG/1
5 TABLET ORAL EVERY 6 HOURS
Qty: 0 | Refills: 0 | Status: DISCONTINUED | OUTPATIENT
Start: 2019-05-04 | End: 2019-05-06

## 2019-05-04 RX ADMIN — Medication 1: at 16:53

## 2019-05-04 RX ADMIN — CARVEDILOL PHOSPHATE 12.5 MILLIGRAM(S): 80 CAPSULE, EXTENDED RELEASE ORAL at 16:53

## 2019-05-04 RX ADMIN — Medication 100 MILLIGRAM(S): at 10:31

## 2019-05-04 RX ADMIN — Medication 250 MILLIGRAM(S): at 10:30

## 2019-05-04 RX ADMIN — Medication 81 MILLIGRAM(S): at 10:31

## 2019-05-04 RX ADMIN — Medication 600 MILLIGRAM(S): at 00:45

## 2019-05-04 RX ADMIN — HEPARIN SODIUM 5000 UNIT(S): 5000 INJECTION INTRAVENOUS; SUBCUTANEOUS at 16:53

## 2019-05-04 RX ADMIN — Medication 250 MILLIGRAM(S): at 05:47

## 2019-05-04 RX ADMIN — Medication 1 TABLET(S): at 11:43

## 2019-05-04 RX ADMIN — SENNA PLUS 2 TABLET(S): 8.6 TABLET ORAL at 21:11

## 2019-05-04 RX ADMIN — Medication 600 MILLIGRAM(S): at 01:45

## 2019-05-04 RX ADMIN — Medication 250 MILLIGRAM(S): at 16:53

## 2019-05-04 RX ADMIN — HEPARIN SODIUM 5000 UNIT(S): 5000 INJECTION INTRAVENOUS; SUBCUTANEOUS at 05:47

## 2019-05-04 RX ADMIN — PANTOPRAZOLE SODIUM 40 MILLIGRAM(S): 20 TABLET, DELAYED RELEASE ORAL at 05:47

## 2019-05-04 RX ADMIN — CARVEDILOL PHOSPHATE 12.5 MILLIGRAM(S): 80 CAPSULE, EXTENDED RELEASE ORAL at 05:46

## 2019-05-04 RX ADMIN — CALCITRIOL 0.25 MICROGRAM(S): 0.5 CAPSULE ORAL at 10:31

## 2019-05-04 RX ADMIN — AMLODIPINE BESYLATE 2.5 MILLIGRAM(S): 2.5 TABLET ORAL at 05:46

## 2019-05-04 RX ADMIN — Medication 1 TABLET(S): at 21:11

## 2019-05-04 RX ADMIN — GABAPENTIN 300 MILLIGRAM(S): 400 CAPSULE ORAL at 10:30

## 2019-05-04 RX ADMIN — SACUBITRIL AND VALSARTAN 1 TABLET(S): 24; 26 TABLET, FILM COATED ORAL at 05:46

## 2019-05-04 RX ADMIN — Medication 1 TABLET(S): at 05:46

## 2019-05-04 RX ADMIN — Medication 2: at 11:43

## 2019-05-04 RX ADMIN — OXYCODONE HYDROCHLORIDE 5 MILLIGRAM(S): 5 TABLET ORAL at 23:53

## 2019-05-04 RX ADMIN — Medication 650 MILLIGRAM(S): at 21:12

## 2019-05-04 RX ADMIN — INSULIN GLARGINE 4 UNIT(S): 100 INJECTION, SOLUTION SUBCUTANEOUS at 21:11

## 2019-05-04 NOTE — PROGRESS NOTE ADULT - ASSESSMENT
57 yo female with past medical history of ESRD on HD (MWF) , chronic HFrEF (EF 35-40% TTE 02/11/19), NICM (cardiac cath 03/18 non-obstructive CAD), paroxysmal Afib (no AC due to GI bleed), uremic pericarditis s/p pericardiocentesis, right sided pleural effusion s/p pigtail catheter, IDDM, HTN, Ascities s/p paracentesis on 02/2019, who presents to the ED with chief complain of left lower extremity pain and swelling. Now here with cellulitis of the left leg.    Left lower extremity cellulitis  - Admit to any bed.  - Partially treated as outpatient with Levofloxacin x 7 days, still presenting with erythema, pain and swelling.  - change to vancomycin today from cefazolin  - Doppler US r/o DVT. Showed baker cyst.  - Dilaudid 0.5 mg IV q6h PRN severe pain, will consider increasing dose if needed.  - CALEB ordered  - can give a dose of gabapentin after dialysis today  - now on vancomycin; will continue    ESRD on HD MWF  - Patient missed HD on 5/1, will c/w HD as scheduled.  - via Left AVF   - Nephro recs noted and appreciated.  - Diet with renal restriction /DASH/ tlc    Insulin dependent T2DM  - C/w Lantus 4 Units at bedtime.  - Will hold Humalog premeals for now.  - Low dose SS  - Hypoglycemia protocol  - F/u A1c in am    HTN  - Elevated SBP noted on admission  - F/u BP after HD as patient missed a session.   - Will restart home meds amlodipine and coreg.    Hyponatremia  -Probably pseudo 2/2 hyperglycemia.  - F/u CMP in am.     Chronic combined HF  - ef 35-40%, grade 2 diastolic dysfunction.   - Non obstructive CAD by cath in 3/2018  - Strict I/o  - daily weight   - c/w Home meds coreg po bid   - c/w offloading fluid with HD, sched per renal    Chronic constipation  - Last BM 2 days ago.   - c/w home meds Senna and colace.     Prophylactic measure:  - DVT prophylaxis: Heparin 5000 Units SQ q12 h  - Florastor while on antibiotics.   - c/w Pantoprazole daily    DISPO: discharge on Monday when aids are in place; will transition to oral abx tomorrow

## 2019-05-04 NOTE — PROGRESS NOTE ADULT - ASSESSMENT
ESRD:  - HD MWF     Anemia - Hgb stable   - no need for MARGI now  - trend H/H    RO:   - cont Calcitriol and CaCO3  - monitor phos

## 2019-05-05 LAB
ANION GAP SERPL CALC-SCNC: 18 MMOL/L — HIGH (ref 5–17)
BUN SERPL-MCNC: 41 MG/DL — HIGH (ref 8–20)
CALCIUM SERPL-MCNC: 9.7 MG/DL — SIGNIFICANT CHANGE UP (ref 8.6–10.2)
CHLORIDE SERPL-SCNC: 89 MMOL/L — LOW (ref 98–107)
CO2 SERPL-SCNC: 24 MMOL/L — SIGNIFICANT CHANGE UP (ref 22–29)
CREAT SERPL-MCNC: 5.14 MG/DL — HIGH (ref 0.5–1.3)
GLUCOSE BLDC GLUCOMTR-MCNC: 129 MG/DL — HIGH (ref 70–99)
GLUCOSE BLDC GLUCOMTR-MCNC: 132 MG/DL — HIGH (ref 70–99)
GLUCOSE BLDC GLUCOMTR-MCNC: 161 MG/DL — HIGH (ref 70–99)
GLUCOSE BLDC GLUCOMTR-MCNC: 202 MG/DL — HIGH (ref 70–99)
GLUCOSE SERPL-MCNC: 141 MG/DL — HIGH (ref 70–115)
HCT VFR BLD CALC: 36.6 % — LOW (ref 37–47)
HGB BLD-MCNC: 11.5 G/DL — LOW (ref 12–16)
MCHC RBC-ENTMCNC: 28.2 PG — SIGNIFICANT CHANGE UP (ref 27–31)
MCHC RBC-ENTMCNC: 31.4 G/DL — LOW (ref 32–36)
MCV RBC AUTO: 89.7 FL — SIGNIFICANT CHANGE UP (ref 81–99)
PLATELET # BLD AUTO: 270 K/UL — SIGNIFICANT CHANGE UP (ref 150–400)
POTASSIUM SERPL-MCNC: 5.4 MMOL/L — HIGH (ref 3.5–5.3)
POTASSIUM SERPL-SCNC: 5.4 MMOL/L — HIGH (ref 3.5–5.3)
RBC # BLD: 4.08 M/UL — LOW (ref 4.4–5.2)
RBC # FLD: 14.9 % — SIGNIFICANT CHANGE UP (ref 11–15.6)
SODIUM SERPL-SCNC: 131 MMOL/L — LOW (ref 135–145)
WBC # BLD: 3.8 K/UL — LOW (ref 4.8–10.8)
WBC # FLD AUTO: 3.8 K/UL — LOW (ref 4.8–10.8)

## 2019-05-05 PROCEDURE — 99232 SBSQ HOSP IP/OBS MODERATE 35: CPT

## 2019-05-05 RX ADMIN — Medication 1: at 11:34

## 2019-05-05 RX ADMIN — CARVEDILOL PHOSPHATE 12.5 MILLIGRAM(S): 80 CAPSULE, EXTENDED RELEASE ORAL at 05:17

## 2019-05-05 RX ADMIN — OXYCODONE HYDROCHLORIDE 5 MILLIGRAM(S): 5 TABLET ORAL at 00:13

## 2019-05-05 RX ADMIN — Medication 2: at 17:12

## 2019-05-05 RX ADMIN — INSULIN GLARGINE 4 UNIT(S): 100 INJECTION, SOLUTION SUBCUTANEOUS at 21:31

## 2019-05-05 RX ADMIN — Medication 1 TABLET(S): at 11:34

## 2019-05-05 RX ADMIN — CARVEDILOL PHOSPHATE 12.5 MILLIGRAM(S): 80 CAPSULE, EXTENDED RELEASE ORAL at 17:31

## 2019-05-05 RX ADMIN — PANTOPRAZOLE SODIUM 40 MILLIGRAM(S): 20 TABLET, DELAYED RELEASE ORAL at 05:17

## 2019-05-05 RX ADMIN — SACUBITRIL AND VALSARTAN 1 TABLET(S): 24; 26 TABLET, FILM COATED ORAL at 05:17

## 2019-05-05 RX ADMIN — Medication 100 MILLIGRAM(S): at 17:31

## 2019-05-05 RX ADMIN — HEPARIN SODIUM 5000 UNIT(S): 5000 INJECTION INTRAVENOUS; SUBCUTANEOUS at 17:31

## 2019-05-05 RX ADMIN — Medication 1 TABLET(S): at 05:17

## 2019-05-05 RX ADMIN — AMLODIPINE BESYLATE 2.5 MILLIGRAM(S): 2.5 TABLET ORAL at 05:17

## 2019-05-05 RX ADMIN — Medication 650 MILLIGRAM(S): at 00:21

## 2019-05-05 RX ADMIN — Medication 100 MILLIGRAM(S): at 11:34

## 2019-05-05 RX ADMIN — CALCITRIOL 0.25 MICROGRAM(S): 0.5 CAPSULE ORAL at 11:34

## 2019-05-05 RX ADMIN — Medication 600 MILLIGRAM(S): at 22:02

## 2019-05-05 RX ADMIN — Medication 1 TABLET(S): at 21:02

## 2019-05-05 RX ADMIN — SENNA PLUS 2 TABLET(S): 8.6 TABLET ORAL at 21:02

## 2019-05-05 RX ADMIN — Medication 250 MILLIGRAM(S): at 05:18

## 2019-05-05 RX ADMIN — Medication 250 MILLIGRAM(S): at 17:31

## 2019-05-05 RX ADMIN — Medication 81 MILLIGRAM(S): at 11:34

## 2019-05-05 RX ADMIN — Medication 600 MILLIGRAM(S): at 21:02

## 2019-05-05 NOTE — PROGRESS NOTE ADULT - ASSESSMENT
57 yo female with past medical history of ESRD on HD (MWF) , chronic HFrEF (EF 35-40% TTE 02/11/19), NICM (cardiac cath 03/18 non-obstructive CAD), paroxysmal Afib (no AC due to GI bleed), uremic pericarditis s/p pericardiocentesis, right sided pleural effusion s/p pigtail catheter, IDDM, HTN, Ascities s/p paracentesis on 02/2019, who presents to the ED with chief complain of left lower extremity pain and swelling. Now here with cellulitis of the left leg.    Left lower extremity cellulitis  - Admit to any bed.  - Partially treated as outpatient with Levofloxacin x 7 days, still presenting with erythema, pain and swelling.  - change to vancomycin today from cefazolin  - Doppler US r/o DVT. Showed baker cyst.  - Dilaudid 0.5 mg IV q6h PRN severe pain, will consider increasing dose if needed.  - CALEB ordered  - can give a dose of gabapentin after dialysis today  - now on vancomycin; will continue  - can change to oral tomorrow; likely doxycycline    ESRD on HD MWF  - Patient missed HD on 5/1, will c/w HD as scheduled.  - via Left AVF   - Nephro recs noted and appreciated.  - Diet with renal restriction /DASH/ tlc    Insulin dependent T2DM  - C/w Lantus 4 Units at bedtime.  - Will hold Humalog premeals for now.  - Low dose SS  - Hypoglycemia protocol  - F/u A1c in am    HTN  - continue to monitor    Chronic combined HF  - ef 35-40%, grade 2 diastolic dysfunction.   - Non obstructive CAD by cath in 3/2018  - Strict I/o  - daily weight   - c/w Home meds coreg po bid   - c/w offloading fluid with HD, sched per renal    Chronic constipation  - Last BM 2 days ago.   - c/w home meds Senna and colace.     Prophylactic measure:  - DVT prophylaxis: Heparin 5000 Units SQ q12 h  - Florastor while on antibiotics.   - c/w Pantoprazole daily    DISPO: discharge on Monday when aids are in place; will transition to oral abx today

## 2019-05-05 NOTE — PROGRESS NOTE ADULT - ASSESSMENT
ESRD:  - HD tomorrow     Anemia - Hgb stable   - maintain off MARGI now  - trend H/H    RO:   - cont Calcitriol and CaCO3  - monitor phos

## 2019-05-06 ENCOUNTER — TRANSCRIPTION ENCOUNTER (OUTPATIENT)
Age: 59
End: 2019-05-06

## 2019-05-06 VITALS
OXYGEN SATURATION: 98 % | TEMPERATURE: 98 F | HEART RATE: 62 BPM | SYSTOLIC BLOOD PRESSURE: 142 MMHG | RESPIRATION RATE: 18 BRPM | DIASTOLIC BLOOD PRESSURE: 80 MMHG

## 2019-05-06 LAB
ANION GAP SERPL CALC-SCNC: 16 MMOL/L — SIGNIFICANT CHANGE UP (ref 5–17)
BUN SERPL-MCNC: 52 MG/DL — HIGH (ref 8–20)
CALCIUM SERPL-MCNC: 9.4 MG/DL — SIGNIFICANT CHANGE UP (ref 8.6–10.2)
CHLORIDE SERPL-SCNC: 88 MMOL/L — LOW (ref 98–107)
CO2 SERPL-SCNC: 23 MMOL/L — SIGNIFICANT CHANGE UP (ref 22–29)
CREAT SERPL-MCNC: 5.89 MG/DL — HIGH (ref 0.5–1.3)
GLUCOSE BLDC GLUCOMTR-MCNC: 130 MG/DL — HIGH (ref 70–99)
GLUCOSE BLDC GLUCOMTR-MCNC: 136 MG/DL — HIGH (ref 70–99)
GLUCOSE BLDC GLUCOMTR-MCNC: 168 MG/DL — HIGH (ref 70–99)
GLUCOSE SERPL-MCNC: 186 MG/DL — HIGH (ref 70–115)
HCT VFR BLD CALC: 33.1 % — LOW (ref 37–47)
HGB BLD-MCNC: 10.4 G/DL — LOW (ref 12–16)
MCHC RBC-ENTMCNC: 27.8 PG — SIGNIFICANT CHANGE UP (ref 27–31)
MCHC RBC-ENTMCNC: 31.4 G/DL — LOW (ref 32–36)
MCV RBC AUTO: 88.5 FL — SIGNIFICANT CHANGE UP (ref 81–99)
PLATELET # BLD AUTO: 275 K/UL — SIGNIFICANT CHANGE UP (ref 150–400)
POTASSIUM SERPL-MCNC: 5.9 MMOL/L — HIGH (ref 3.5–5.3)
POTASSIUM SERPL-SCNC: 5.9 MMOL/L — HIGH (ref 3.5–5.3)
RBC # BLD: 3.74 M/UL — LOW (ref 4.4–5.2)
RBC # FLD: 14.8 % — SIGNIFICANT CHANGE UP (ref 11–15.6)
SODIUM SERPL-SCNC: 127 MMOL/L — LOW (ref 135–145)
WBC # BLD: 3.7 K/UL — LOW (ref 4.8–10.8)
WBC # FLD AUTO: 3.7 K/UL — LOW (ref 4.8–10.8)

## 2019-05-06 PROCEDURE — 93005 ELECTROCARDIOGRAM TRACING: CPT

## 2019-05-06 PROCEDURE — 36415 COLL VENOUS BLD VENIPUNCTURE: CPT

## 2019-05-06 PROCEDURE — 99239 HOSP IP/OBS DSCHRG MGMT >30: CPT

## 2019-05-06 PROCEDURE — 85730 THROMBOPLASTIN TIME PARTIAL: CPT

## 2019-05-06 PROCEDURE — 93971 EXTREMITY STUDY: CPT

## 2019-05-06 PROCEDURE — 99283 EMERGENCY DEPT VISIT LOW MDM: CPT

## 2019-05-06 PROCEDURE — 80048 BASIC METABOLIC PNL TOTAL CA: CPT

## 2019-05-06 PROCEDURE — 83605 ASSAY OF LACTIC ACID: CPT

## 2019-05-06 PROCEDURE — 85610 PROTHROMBIN TIME: CPT

## 2019-05-06 PROCEDURE — 99285 EMERGENCY DEPT VISIT HI MDM: CPT

## 2019-05-06 PROCEDURE — 80053 COMPREHEN METABOLIC PANEL: CPT

## 2019-05-06 PROCEDURE — 83036 HEMOGLOBIN GLYCOSYLATED A1C: CPT

## 2019-05-06 PROCEDURE — 73590 X-RAY EXAM OF LOWER LEG: CPT

## 2019-05-06 PROCEDURE — 85027 COMPLETE CBC AUTOMATED: CPT

## 2019-05-06 PROCEDURE — 93923 UPR/LXTR ART STDY 3+ LVLS: CPT

## 2019-05-06 PROCEDURE — T1013: CPT

## 2019-05-06 PROCEDURE — 82962 GLUCOSE BLOOD TEST: CPT

## 2019-05-06 RX ORDER — ASCORBIC ACID 60 MG
1 TABLET,CHEWABLE ORAL
Qty: 30 | Refills: 0
Start: 2019-05-06 | End: 2019-06-04

## 2019-05-06 RX ORDER — SACCHAROMYCES BOULARDII 250 MG
1 POWDER IN PACKET (EA) ORAL
Qty: 8 | Refills: 0
Start: 2019-05-06 | End: 2019-05-09

## 2019-05-06 RX ADMIN — OXYCODONE HYDROCHLORIDE 5 MILLIGRAM(S): 5 TABLET ORAL at 01:02

## 2019-05-06 RX ADMIN — PANTOPRAZOLE SODIUM 40 MILLIGRAM(S): 20 TABLET, DELAYED RELEASE ORAL at 05:46

## 2019-05-06 RX ADMIN — Medication 1: at 16:25

## 2019-05-06 RX ADMIN — CARVEDILOL PHOSPHATE 12.5 MILLIGRAM(S): 80 CAPSULE, EXTENDED RELEASE ORAL at 17:01

## 2019-05-06 RX ADMIN — Medication 81 MILLIGRAM(S): at 13:52

## 2019-05-06 RX ADMIN — Medication 100 MILLIGRAM(S): at 05:47

## 2019-05-06 RX ADMIN — Medication 250 MILLIGRAM(S): at 05:46

## 2019-05-06 RX ADMIN — Medication 1 TABLET(S): at 05:47

## 2019-05-06 RX ADMIN — CALCITRIOL 0.25 MICROGRAM(S): 0.5 CAPSULE ORAL at 13:52

## 2019-05-06 RX ADMIN — OXYCODONE HYDROCHLORIDE 5 MILLIGRAM(S): 5 TABLET ORAL at 01:17

## 2019-05-06 RX ADMIN — HEPARIN SODIUM 5000 UNIT(S): 5000 INJECTION INTRAVENOUS; SUBCUTANEOUS at 17:01

## 2019-05-06 RX ADMIN — CARVEDILOL PHOSPHATE 12.5 MILLIGRAM(S): 80 CAPSULE, EXTENDED RELEASE ORAL at 05:46

## 2019-05-06 RX ADMIN — HEPARIN SODIUM 5000 UNIT(S): 5000 INJECTION INTRAVENOUS; SUBCUTANEOUS at 07:54

## 2019-05-06 RX ADMIN — Medication 250 MILLIGRAM(S): at 17:00

## 2019-05-06 RX ADMIN — Medication 1 TABLET(S): at 13:53

## 2019-05-06 RX ADMIN — Medication 100 MILLIGRAM(S): at 13:53

## 2019-05-06 RX ADMIN — Medication 100 MILLIGRAM(S): at 17:01

## 2019-05-06 RX ADMIN — AMLODIPINE BESYLATE 2.5 MILLIGRAM(S): 2.5 TABLET ORAL at 05:47

## 2019-05-06 RX ADMIN — SACUBITRIL AND VALSARTAN 1 TABLET(S): 24; 26 TABLET, FILM COATED ORAL at 05:46

## 2019-05-06 NOTE — DISCHARGE NOTE PROVIDER - NSDCCPCAREPLAN_GEN_ALL_CORE_FT
PRINCIPAL DISCHARGE DIAGNOSIS  Diagnosis: Cellulitis  Assessment and Plan of Treatment: LLE cellulitis, continue with bactrim and florastor for 4 more days.      SECONDARY DISCHARGE DIAGNOSES  Diagnosis: Paroxysmal A-fib  Assessment and Plan of Treatment:     Diagnosis: Chronic combined systolic and diastolic heart failure  Assessment and Plan of Treatment:     Diagnosis: ESRD on dialysis  Assessment and Plan of Treatment:     Diagnosis: HTN (hypertension)  Assessment and Plan of Treatment: continue home medications    Diagnosis: HLD (hyperlipidemia)  Assessment and Plan of Treatment: continue home medications    Diagnosis: DM (diabetes mellitus)  Assessment and Plan of Treatment: continue home dose insulin. PRINCIPAL DISCHARGE DIAGNOSIS  Diagnosis: Cellulitis  Assessment and Plan of Treatment: LLE cellulitis, continue with bactrim and florastor for 4 more days.      SECONDARY DISCHARGE DIAGNOSES  Diagnosis: Paroxysmal A-fib  Assessment and Plan of Treatment: continue rate controlled. anticoagulation held due to past GI bleed.    Diagnosis: Chronic combined systolic and diastolic heart failure  Assessment and Plan of Treatment: continue home medications    Diagnosis: ESRD on dialysis  Assessment and Plan of Treatment: continue scheduled dialysis    Diagnosis: HTN (hypertension)  Assessment and Plan of Treatment: continue home medications    Diagnosis: HLD (hyperlipidemia)  Assessment and Plan of Treatment: continue home medications    Diagnosis: DM (diabetes mellitus)  Assessment and Plan of Treatment: continue home dose insulin.

## 2019-05-06 NOTE — DISCHARGE NOTE PROVIDER - HOSPITAL COURSE
HPI:    57 yo female with past medical history of ESRD on HD (MWF) , chronic HFrEF (EF 35-40% TTE 02/11/19), NICM (cardiac cath 03/18 non-obstructive CAD), paroxysmal Afib (no AC due to GI bleed), uremic pericarditis s/p pericardiocentesis, right sided pleural effusion s/p pigtail catheter, IDDM, HTN, Ascities s/p paracentesis on 02/2019, who presents to the ED with chief complain of left lower extremity pain and swelling.        Patient states she started history of present illness 1 week prior to admission characterized for worsening pain on lateral aspect of left leg bellow the knee, as per patient initially 2/10 intensity now 9/10. The pain is pressure like- stabbing like, and radiates to toes and posterior aspect of the knee, concomitantly swelling of the leg, non quantified hyperthermia, nausea and 1 episode of vomiting. Patient visited PMD who indicated Levofloxacin x 7 days and PO NSAID without improvement of the symptoms.        In the ED patient was found to have Left LE significant swelling and tenderness over the lateral compartment, Doppler US showed evidence of baker cyst and R/o DVT. Patient was started on Cefazolin and will be admitted with diagnosis of cellulitis.        Patient continued on cefazolin with some improvement, switched to IV vancomycin with some improvement; will continue on bactrim for 4 more days. Patient to follow up with primary care as outpatient.        ICU Vital Signs Last 24 Hrs    T(C): 36.9 (05 May 2019 23:29), Max: 36.9 (05 May 2019 23:29)    T(F): 98.5 (05 May 2019 23:29), Max: 98.5 (05 May 2019 23:29)    HR: 67 (05 May 2019 23:29) (67 - 69)    BP: 150/81 (05 May 2019 23:29) (126/70 - 150/81)    BP(mean): --    ABP: --    ABP(mean): --    RR: 18 (05 May 2019 23:29) (18 - 18)    SpO2: 98% (05 May 2019 23:29) (92% - 98%)        General: Well developed; well nourished; in no acute distress    Eyes: PERRLA, EOMI; conjunctiva and sclera clear    Head: Normocephalic; atraumatic    ENMT: No nasal discharge; airway clear    Neck: Supple; non tender; no masses    Respiratory: No wheezes, rales or rhonchi    Cardiovascular: Regular rate and rhythm. S1 and S2 Normal; No murmurs, gallops or rubs    Gastrointestinal: Soft non-tender non-distended; Normal bowel sounds    Genitourinary: No costovertebral angle tenderness    Neurological: Alert and oriented x4    Skin: Warm and dry. No acute rash    Musculoskeletal: normal tone, without deformities; shiny skin of the left lower extremity; no significant redness but edematous compared to right. decreased tenderness noted today; AVF without discharge, palpable thrill.    Psychiatric: Cooperative and appropriate        discharge time 34 minutes 57 yo female with past medical history of ESRD on HD (MWF) , chronic HFrEF (EF 35-40% TTE 02/11/19), NICM (cardiac cath 03/18 non-obstructive CAD), paroxysmal Afib (no AC due to GI bleed), uremic pericarditis s/p pericardiocentesis, right sided pleural effusion s/p pigtail catheter, IDDM, HTN, Ascities s/p paracentesis on 02/2019, who presents to the ED with chief complain of left lower extremity pain and swelling.        Patient states she started history of present illness 1 week prior to admission characterized for worsening pain on lateral aspect of left leg bellow the knee, as per patient initially 2/10 intensity now 9/10. The pain is pressure like- stabbing like, and radiates to toes and posterior aspect of the knee, concomitantly swelling of the leg, non quantified hyperthermia, nausea and 1 episode of vomiting. Patient visited PMD who indicated Levofloxacin x 7 days and PO NSAID without improvement of the symptoms.        In the ED patient was found to have Left LE significant swelling and tenderness over the lateral compartment, Doppler US showed evidence of baker cyst and R/o DVT. Patient was started on Cefazolin and will be admitted with diagnosis of cellulitis.        Patient continued on cefazolin with some improvement, switched to IV vancomycin with some improvement; will continue on bactrim for 4 more days. Patient to follow up with primary care as outpatient.        ICU Vital Signs Last 24 Hrs    T(C): 36.9 (05 May 2019 23:29), Max: 36.9 (05 May 2019 23:29)    T(F): 98.5 (05 May 2019 23:29), Max: 98.5 (05 May 2019 23:29)    HR: 67 (05 May 2019 23:29) (67 - 69)    BP: 150/81 (05 May 2019 23:29) (126/70 - 150/81)    BP(mean): --    ABP: --    ABP(mean): --    RR: 18 (05 May 2019 23:29) (18 - 18)    SpO2: 98% (05 May 2019 23:29) (92% - 98%)        General: Well developed; well nourished; in no acute distress    Eyes: PERRLA, EOMI; conjunctiva and sclera clear    Head: Normocephalic; atraumatic    ENMT: No nasal discharge; airway clear    Neck: Supple; non tender; no masses    Respiratory: No wheezes, rales or rhonchi    Cardiovascular: Regular rate and rhythm. S1 and S2 Normal; No murmurs, gallops or rubs    Gastrointestinal: Soft non-tender non-distended; Normal bowel sounds    Genitourinary: No costovertebral angle tenderness    Neurological: Alert and oriented x4    Skin: Warm and dry. No acute rash    Musculoskeletal: normal tone, without deformities; shiny skin of the left lower extremity; no significant redness but edematous compared to right. decreased tenderness noted today; AVF without discharge, palpable thrill.    Psychiatric: Cooperative and appropriate        discharge time 34 minutes

## 2019-05-06 NOTE — DISCHARGE NOTE NURSING/CASE MANAGEMENT/SOCIAL WORK - NSDCDPATPORTLINK_GEN_ALL_CORE
You can access the [facility] Patient Portal, offered by [facility], by registering with the following website: [email or web address]

## 2019-05-06 NOTE — PROGRESS NOTE ADULT - PROVIDER SPECIALTY LIST ADULT
Hospitalist
Nephrology
Hospitalist

## 2019-05-06 NOTE — DISCHARGE NOTE PROVIDER - CARE PROVIDER_API CALL
Ian Yoon)  Nephrology  [address] [place] [address]  Phone: [phone]  Fax: [phone]  Follow Up Time: 1 week    primary care physician,   Phone: (   )    -  Fax: (   )    -  Follow Up Time: 1-3 days

## 2019-05-06 NOTE — DISCHARGE NOTE PROVIDER - PROVIDER TOKENS
PROVIDER:[TOKEN:[[id number]],FOLLOWUP:[1 week]],FREE:[LAST:[primary care physician],PHONE:[(   )    -],FAX:[(   )    -],FOLLOWUP:[1-3 days]]

## 2019-05-06 NOTE — PROGRESS NOTE ADULT - SUBJECTIVE AND OBJECTIVE BOX
CC: LLE swelling, Missed HD (01 May 2019 19:42)    HPI:  57 yo female with past medical history of ESRD on HD (MWF) , chronic HFrEF (EF 35-40% TTE 02/11/19), NICM (cardiac cath 03/18 non-obstructive CAD), paroxysmal Afib (no AC due to GI bleed), uremic pericarditis s/p pericardiocentesis, right sided pleural effusion s/p pigtail catheter, IDDM, HTN, Ascities s/p paracentesis on 02/2019, who presents to the ED with chief complain of left lower extremity pain and swelling.    Patient states she started history of present illness 1 week prior to admission characterized for worsening pain on lateral aspect of left leg bellow the knee, as per patient initially 2/10 intensity now 9/10. The pain is pressure like- stabbing like, and radiates to toes and posterior aspect of the knee, concomitantly swelling of the leg, non quantified hyperthermia, nausea and 1 episode of vomiting. Patient visited PMD who indicated Levofloxacin x 7 days and PO NSAID without improvement of the symptoms.    In the ED patient was found to have Left LE significant swelling and tenderness over the lateral compartment, Doppler US showed evidence of baker cyst and R/o DVT. Patient was started on Cefazolin and will be admitted with diagnosis of cellulitis.    INTERVAL HPI/OVERNIGHT EVENTS:  patient with some improvement  patient eager for discharge tomorrow    ICU Vital Signs Last 24 Hrs  T(C): 36.7 (05 May 2019 07:35), Max: 37 (04 May 2019 23:50)  T(F): 98.1 (05 May 2019 07:35), Max: 98.6 (04 May 2019 23:50)  HR: 69 (05 May 2019 07:35) (69 - 73)  BP: 141/80 (05 May 2019 07:35) (141/80 - 152/78)  BP(mean): --  ABP: --  ABP(mean): --  RR: 18 (05 May 2019 07:35) (18 - 18)  SpO2: 92% (05 May 2019 07:35) (92% - 98%)    PHYSICAL EXAM:  General: Well developed; well nourished; in no acute distress  Eyes: PERRLA, EOMI; conjunctiva and sclera clear  Head: Normocephalic; atraumatic  ENMT: No nasal discharge; airway clear  Neck: Supple; non tender; no masses  Respiratory: No wheezes, rales or rhonchi  Cardiovascular: Regular rate and rhythm. S1 and S2 Normal; No murmurs, gallops or rubs  Gastrointestinal: Soft non-tender non-distended; Normal bowel sounds  Genitourinary: No costovertebral angle tenderness  Neurological: Alert and oriented x4  Skin: Warm and dry. No acute rash  Musculoskeletal: normal tone, without deformities; shiny skin of the left lower extremity; no significant redness but edematous compared to right. decreased tenderness noted today  Psychiatric: Cooperative and appropriate    CAPILLARY BLOOD GLUCOSE  POCT Blood Glucose.: 156 mg/dL (02 May 2019 12:24)  POCT Blood Glucose.: 143 mg/dL (02 May 2019 07:29)  POCT Blood Glucose.: 160 mg/dL (01 May 2019 23:55)    LIVER FUNCTIONS - ( 02 May 2019 10:25 )  Alb: 3.5 g/dL / Pro: 7.6 g/dL / ALK PHOS: 134 U/L / ALT: <5 U/L / AST: 11 U/L / GGT: x           Hemoglobin A1C, Whole Blood: 7.6 % (05-02-19 @ 10:25)    MEDICATIONS  (STANDING):  amLODIPine   Tablet 2.5 milliGRAM(s) Oral daily  aspirin  chewable 81 milliGRAM(s) Oral daily  calcitriol   Capsule 0.25 MICROGram(s) Oral daily  calcium carbonate   1250 mG (OsCal) 1 Tablet(s) Oral three times a day  carvedilol 12.5 milliGRAM(s) Oral every 12 hours  ceFAZolin   IVPB 1000 milliGRAM(s) IV Intermittent every 8 hours  dextrose 5%. 1000 milliLiter(s) (50 mL/Hr) IV Continuous <Continuous>  dextrose 50% Injectable 12.5 Gram(s) IV Push once  dextrose 50% Injectable 25 Gram(s) IV Push once  dextrose 50% Injectable 25 Gram(s) IV Push once  docusate sodium 100 milliGRAM(s) Oral daily  heparin  Injectable 5000 Unit(s) SubCutaneous every 12 hours  insulin glargine Injectable (LANTUS) 4 Unit(s) SubCutaneous at bedtime  insulin lispro (HumaLOG) corrective regimen sliding scale   SubCutaneous three times a day before meals  insulin lispro (HumaLOG) corrective regimen sliding scale   SubCutaneous at bedtime  pantoprazole    Tablet 40 milliGRAM(s) Oral before breakfast  saccharomyces boulardii 250 milliGRAM(s) Oral two times a day  sacubitril 49 mG/valsartan 51 mG 1 Tablet(s) Oral daily  senna 2 Tablet(s) Oral at bedtime    MEDICATIONS  (PRN):  acetaminophen   Tablet .. 650 milliGRAM(s) Oral every 6 hours PRN Temp greater or equal to 38C (100.4F), Mild Pain (1 - 3)  dextrose 40% Gel 15 Gram(s) Oral once PRN Blood Glucose LESS THAN 70 milliGRAM(s)/deciliter  glucagon  Injectable 1 milliGRAM(s) IntraMuscular once PRN Glucose LESS THAN 70 milligrams/deciliter  HYDROmorphone  Injectable 0.5 milliGRAM(s) IV Push every 6 hours PRN Severe Pain (7 - 10)  ibuprofen  Tablet. 600 milliGRAM(s) Oral every 8 hours PRN Moderate Pain (4 - 6), Severe Pain (7 - 10)    RADIOLOGY & ADDITIONAL TESTS:
CC: LLE swelling, Missed HD (01 May 2019 19:42)    HPI:  57 yo female with past medical history of ESRD on HD (MWF) , chronic HFrEF (EF 35-40% TTE 02/11/19), NICM (cardiac cath 03/18 non-obstructive CAD), paroxysmal Afib (no AC due to GI bleed), uremic pericarditis s/p pericardiocentesis, right sided pleural effusion s/p pigtail catheter, IDDM, HTN, Ascities s/p paracentesis on 02/2019, who presents to the ED with chief complain of left lower extremity pain and swelling.    Patient states she started history of present illness 1 week prior to admission characterized for worsening pain on lateral aspect of left leg bellow the knee, as per patient initially 2/10 intensity now 9/10. The pain is pressure like- stabbing like, and radiates to toes and posterior aspect of the knee, concomitantly swelling of the leg, non quantified hyperthermia, nausea and 1 episode of vomiting. Patient visited PMD who indicated Levofloxacin x 7 days and PO NSAID without improvement of the symptoms.    In the ED patient was found to have Left LE significant swelling and tenderness over the lateral compartment, Doppler US showed evidence of baker cyst and R/o DVT. Patient was started on Cefazolin and will be admitted with diagnosis of cellulitis.    INTERVAL HPI/OVERNIGHT EVENTS:  Patient with some improvement  patient to continue IV antibiotics for now will change to vancomycin  PT to see patient  likely discharge on Monday when aids are in place    Vital Signs Last 24 Hrs  T(C): 36.8 (02 May 2019 07:43), Max: 37.1 (01 May 2019 22:15)  T(F): 98.2 (02 May 2019 07:43), Max: 98.7 (01 May 2019 22:15)  HR: 71 (02 May 2019 07:43) (71 - 80)  BP: 161/79 (02 May 2019 07:43) (161/79 - 170/90)  BP(mean): --  RR: 18 (02 May 2019 07:43) (16 - 18)  SpO2: 97% (02 May 2019 07:43) (95% - 97%)    PHYSICAL EXAM:  General: Well developed; well nourished; in no acute distress  Eyes: PERRLA, EOMI; conjunctiva and sclera clear  Head: Normocephalic; atraumatic  ENMT: No nasal discharge; airway clear  Neck: Supple; non tender; no masses  Respiratory: No wheezes, rales or rhonchi  Cardiovascular: Regular rate and rhythm. S1 and S2 Normal; No murmurs, gallops or rubs  Gastrointestinal: Soft non-tender non-distended; Normal bowel sounds  Genitourinary: No costovertebral angle tenderness  Neurological: Alert and oriented x4  Skin: Warm and dry. No acute rash  Musculoskeletal: normal tone, without deformities; shiny skin of the left lower extremity; no significant redness but edematous compared to right. decreased tenderness noted today  Psychiatric: Cooperative and appropriate    CAPILLARY BLOOD GLUCOSE  POCT Blood Glucose.: 156 mg/dL (02 May 2019 12:24)  POCT Blood Glucose.: 143 mg/dL (02 May 2019 07:29)  POCT Blood Glucose.: 160 mg/dL (01 May 2019 23:55)    LIVER FUNCTIONS - ( 02 May 2019 10:25 )  Alb: 3.5 g/dL / Pro: 7.6 g/dL / ALK PHOS: 134 U/L / ALT: <5 U/L / AST: 11 U/L / GGT: x           Hemoglobin A1C, Whole Blood: 7.6 % (05-02-19 @ 10:25)    MEDICATIONS  (STANDING):  amLODIPine   Tablet 2.5 milliGRAM(s) Oral daily  aspirin  chewable 81 milliGRAM(s) Oral daily  calcitriol   Capsule 0.25 MICROGram(s) Oral daily  calcium carbonate   1250 mG (OsCal) 1 Tablet(s) Oral three times a day  carvedilol 12.5 milliGRAM(s) Oral every 12 hours  ceFAZolin   IVPB 1000 milliGRAM(s) IV Intermittent every 8 hours  dextrose 5%. 1000 milliLiter(s) (50 mL/Hr) IV Continuous <Continuous>  dextrose 50% Injectable 12.5 Gram(s) IV Push once  dextrose 50% Injectable 25 Gram(s) IV Push once  dextrose 50% Injectable 25 Gram(s) IV Push once  docusate sodium 100 milliGRAM(s) Oral daily  heparin  Injectable 5000 Unit(s) SubCutaneous every 12 hours  insulin glargine Injectable (LANTUS) 4 Unit(s) SubCutaneous at bedtime  insulin lispro (HumaLOG) corrective regimen sliding scale   SubCutaneous three times a day before meals  insulin lispro (HumaLOG) corrective regimen sliding scale   SubCutaneous at bedtime  pantoprazole    Tablet 40 milliGRAM(s) Oral before breakfast  saccharomyces boulardii 250 milliGRAM(s) Oral two times a day  sacubitril 49 mG/valsartan 51 mG 1 Tablet(s) Oral daily  senna 2 Tablet(s) Oral at bedtime    MEDICATIONS  (PRN):  acetaminophen   Tablet .. 650 milliGRAM(s) Oral every 6 hours PRN Temp greater or equal to 38C (100.4F), Mild Pain (1 - 3)  dextrose 40% Gel 15 Gram(s) Oral once PRN Blood Glucose LESS THAN 70 milliGRAM(s)/deciliter  glucagon  Injectable 1 milliGRAM(s) IntraMuscular once PRN Glucose LESS THAN 70 milligrams/deciliter  HYDROmorphone  Injectable 0.5 milliGRAM(s) IV Push every 6 hours PRN Severe Pain (7 - 10)  ibuprofen  Tablet. 600 milliGRAM(s) Oral every 8 hours PRN Moderate Pain (4 - 6), Severe Pain (7 - 10)    RADIOLOGY & ADDITIONAL TESTS:
CC: LLE swelling, Missed HD (01 May 2019 19:42)    HPI:  59 yo female with past medical history of ESRD on HD (MWF) , chronic HFrEF (EF 35-40% TTE 02/11/19), NICM (cardiac cath 03/18 non-obstructive CAD), paroxysmal Afib (no AC due to GI bleed), uremic pericarditis s/p pericardiocentesis, right sided pleural effusion s/p pigtail catheter, IDDM, HTN, Ascities s/p paracentesis on 02/2019, who presents to the ED with chief complain of left lower extremity pain and swelling.    Patient states she started history of present illness 1 week prior to admission characterized for worsening pain on lateral aspect of left leg bellow the knee, as per patient initially 2/10 intensity now 9/10. The pain is pressure like- stabbing like, and radiates to toes and posterior aspect of the knee, concomitantly swelling of the leg, non quantified hyperthermia, nausea and 1 episode of vomiting. Patient visited PMD who indicated Levofloxacin x 7 days and PO NSAID without improvement of the symptoms.    In the ED patient was found to have Left LE significant swelling and tenderness over the lateral compartment, Doppler US showed evidence of baker cyst and R/o DVT. Patient was started on Cefazolin and will be admitted with diagnosis of cellulitis.    ROS: Patient states she has been presenting increase abdominal girth for the last week. Also, patient state she has not been using O2 regularly  except if SOB present. Patient denies chest pain, abdominal pain, diarrhea, SOB. (01 May 2019 19:42)    INTERVAL HPI/OVERNIGHT EVENTS:  Patient with some improvement  patient to continue IV antibiotics for now will change to vancomycin  PT to see patient    Vital Signs Last 24 Hrs  T(C): 36.8 (02 May 2019 07:43), Max: 37.1 (01 May 2019 22:15)  T(F): 98.2 (02 May 2019 07:43), Max: 98.7 (01 May 2019 22:15)  HR: 71 (02 May 2019 07:43) (71 - 80)  BP: 161/79 (02 May 2019 07:43) (161/79 - 170/90)  BP(mean): --  RR: 18 (02 May 2019 07:43) (16 - 18)  SpO2: 97% (02 May 2019 07:43) (95% - 97%)    PHYSICAL EXAM:  General: Well developed; well nourished; in no acute distress  Eyes: PERRLA, EOMI; conjunctiva and sclera clear  Head: Normocephalic; atraumatic  ENMT: No nasal discharge; airway clear  Neck: Supple; non tender; no masses  Respiratory: No wheezes, rales or rhonchi  Cardiovascular: Regular rate and rhythm. S1 and S2 Normal; No murmurs, gallops or rubs  Gastrointestinal: Soft non-tender non-distended; Normal bowel sounds  Genitourinary: No costovertebral angle tenderness  Neurological: Alert and oriented x4  Skin: Warm and dry. No acute rash  Musculoskeletal: normal tone, without deformities; shiny skin of the left lower extremity; no significant redness but edematous compared to right. Tender to touch, no change in temperature. no ulceration noted.  Psychiatric: Cooperative and appropriate                       11.2   5.1   )-----------( 244      ( 02 May 2019 10:25 )             37.3     02 May 2019 10:25    137    |  93     |  21.0   ----------------------------<  189    4.2     |  26.0   |  3.33     Ca    9.5        02 May 2019 10:25    TPro  7.6    /  Alb  3.5    /  TBili  0.8    /  DBili  x      /  AST  11     /  ALT  <5     /  AlkPhos  134    02 May 2019 10:25    PT/INR - ( 01 May 2019 14:04 )   PT: 14.8 sec;   INR: 1.28 ratio       PTT - ( 01 May 2019 14:04 )  PTT:29.7 sec    CAPILLARY BLOOD GLUCOSE  POCT Blood Glucose.: 156 mg/dL (02 May 2019 12:24)  POCT Blood Glucose.: 143 mg/dL (02 May 2019 07:29)  POCT Blood Glucose.: 160 mg/dL (01 May 2019 23:55)    LIVER FUNCTIONS - ( 02 May 2019 10:25 )  Alb: 3.5 g/dL / Pro: 7.6 g/dL / ALK PHOS: 134 U/L / ALT: <5 U/L / AST: 11 U/L / GGT: x           Hemoglobin A1C, Whole Blood: 7.6 % (05-02-19 @ 10:25)    MEDICATIONS  (STANDING):  amLODIPine   Tablet 2.5 milliGRAM(s) Oral daily  aspirin  chewable 81 milliGRAM(s) Oral daily  calcitriol   Capsule 0.25 MICROGram(s) Oral daily  calcium carbonate   1250 mG (OsCal) 1 Tablet(s) Oral three times a day  carvedilol 12.5 milliGRAM(s) Oral every 12 hours  ceFAZolin   IVPB 1000 milliGRAM(s) IV Intermittent every 8 hours  dextrose 5%. 1000 milliLiter(s) (50 mL/Hr) IV Continuous <Continuous>  dextrose 50% Injectable 12.5 Gram(s) IV Push once  dextrose 50% Injectable 25 Gram(s) IV Push once  dextrose 50% Injectable 25 Gram(s) IV Push once  docusate sodium 100 milliGRAM(s) Oral daily  heparin  Injectable 5000 Unit(s) SubCutaneous every 12 hours  insulin glargine Injectable (LANTUS) 4 Unit(s) SubCutaneous at bedtime  insulin lispro (HumaLOG) corrective regimen sliding scale   SubCutaneous three times a day before meals  insulin lispro (HumaLOG) corrective regimen sliding scale   SubCutaneous at bedtime  pantoprazole    Tablet 40 milliGRAM(s) Oral before breakfast  saccharomyces boulardii 250 milliGRAM(s) Oral two times a day  sacubitril 49 mG/valsartan 51 mG 1 Tablet(s) Oral daily  senna 2 Tablet(s) Oral at bedtime    MEDICATIONS  (PRN):  acetaminophen   Tablet .. 650 milliGRAM(s) Oral every 6 hours PRN Temp greater or equal to 38C (100.4F), Mild Pain (1 - 3)  dextrose 40% Gel 15 Gram(s) Oral once PRN Blood Glucose LESS THAN 70 milliGRAM(s)/deciliter  glucagon  Injectable 1 milliGRAM(s) IntraMuscular once PRN Glucose LESS THAN 70 milligrams/deciliter  HYDROmorphone  Injectable 0.5 milliGRAM(s) IV Push every 6 hours PRN Severe Pain (7 - 10)  ibuprofen  Tablet. 600 milliGRAM(s) Oral every 8 hours PRN Moderate Pain (4 - 6), Severe Pain (7 - 10)    RADIOLOGY & ADDITIONAL TESTS:
CC: LLE swelling, Missed HD (01 May 2019 19:42)    HPI:  59 yo female with past medical history of ESRD on HD (MWF) , chronic HFrEF (EF 35-40% TTE 02/11/19), NICM (cardiac cath 03/18 non-obstructive CAD), paroxysmal Afib (no AC due to GI bleed), uremic pericarditis s/p pericardiocentesis, right sided pleural effusion s/p pigtail catheter, IDDM, HTN, Ascities s/p paracentesis on 02/2019, who presents to the ED with chief complain of left lower extremity pain and swelling.    Patient states she started history of present illness 1 week prior to admission characterized for worsening pain on lateral aspect of left leg bellow the knee, as per patient initially 2/10 intensity now 9/10. The pain is pressure like- stabbing like, and radiates to toes and posterior aspect of the knee, concomitantly swelling of the leg, non quantified hyperthermia, nausea and 1 episode of vomiting. Patient visited PMD who indicated Levofloxacin x 7 days and PO NSAID without improvement of the symptoms.    In the ED patient was found to have Left LE significant swelling and tenderness over the lateral compartment, Doppler US showed evidence of baker cyst and R/o DVT. Patient was started on Cefazolin and will be admitted with diagnosis of cellulitis.    ROS: Patient states she has been presenting increase abdominal girth for the last week. Also, patient state she has not been using O2 regularly  except if SOB present. Patient denies chest pain, abdominal pain, diarrhea, SOB. (01 May 2019 19:42)    INTERVAL HPI/OVERNIGHT EVENTS:  Patient without significant change overnight  patient to continue IV antibiotics for now  PT to see patient  ordered CALEB for patient to determine any vascular insufficiency; at this time does not appear to be an ischemic limb    Vital Signs Last 24 Hrs  T(C): 36.8 (02 May 2019 07:43), Max: 37.1 (01 May 2019 22:15)  T(F): 98.2 (02 May 2019 07:43), Max: 98.7 (01 May 2019 22:15)  HR: 71 (02 May 2019 07:43) (71 - 80)  BP: 161/79 (02 May 2019 07:43) (161/79 - 170/90)  BP(mean): --  RR: 18 (02 May 2019 07:43) (16 - 18)  SpO2: 97% (02 May 2019 07:43) (95% - 97%)    PHYSICAL EXAM:  General: Well developed; well nourished; in no acute distress  Eyes: PERRLA, EOMI; conjunctiva and sclera clear  Head: Normocephalic; atraumatic  ENMT: No nasal discharge; airway clear  Neck: Supple; non tender; no masses  Respiratory: No wheezes, rales or rhonchi  Cardiovascular: Regular rate and rhythm. S1 and S2 Normal; No murmurs, gallops or rubs  Gastrointestinal: Soft non-tender non-distended; Normal bowel sounds  Genitourinary: No costovertebral angle tenderness  Neurological: Alert and oriented x4  Skin: Warm and dry. No acute rash  Musculoskeletal: normal tone, without deformities; shiny skin of the left lower extremity; no significant redness but edematous compared to right. Tender to touch, no change in temperature. no ulceration noted.  Psychiatric: Cooperative and appropriate                       11.2   5.1   )-----------( 244      ( 02 May 2019 10:25 )             37.3     02 May 2019 10:25    137    |  93     |  21.0   ----------------------------<  189    4.2     |  26.0   |  3.33     Ca    9.5        02 May 2019 10:25    TPro  7.6    /  Alb  3.5    /  TBili  0.8    /  DBili  x      /  AST  11     /  ALT  <5     /  AlkPhos  134    02 May 2019 10:25    PT/INR - ( 01 May 2019 14:04 )   PT: 14.8 sec;   INR: 1.28 ratio       PTT - ( 01 May 2019 14:04 )  PTT:29.7 sec    CAPILLARY BLOOD GLUCOSE  POCT Blood Glucose.: 156 mg/dL (02 May 2019 12:24)  POCT Blood Glucose.: 143 mg/dL (02 May 2019 07:29)  POCT Blood Glucose.: 160 mg/dL (01 May 2019 23:55)    LIVER FUNCTIONS - ( 02 May 2019 10:25 )  Alb: 3.5 g/dL / Pro: 7.6 g/dL / ALK PHOS: 134 U/L / ALT: <5 U/L / AST: 11 U/L / GGT: x           Hemoglobin A1C, Whole Blood: 7.6 % (05-02-19 @ 10:25)    MEDICATIONS  (STANDING):  amLODIPine   Tablet 2.5 milliGRAM(s) Oral daily  aspirin  chewable 81 milliGRAM(s) Oral daily  calcitriol   Capsule 0.25 MICROGram(s) Oral daily  calcium carbonate   1250 mG (OsCal) 1 Tablet(s) Oral three times a day  carvedilol 12.5 milliGRAM(s) Oral every 12 hours  ceFAZolin   IVPB 1000 milliGRAM(s) IV Intermittent every 8 hours  dextrose 5%. 1000 milliLiter(s) (50 mL/Hr) IV Continuous <Continuous>  dextrose 50% Injectable 12.5 Gram(s) IV Push once  dextrose 50% Injectable 25 Gram(s) IV Push once  dextrose 50% Injectable 25 Gram(s) IV Push once  docusate sodium 100 milliGRAM(s) Oral daily  heparin  Injectable 5000 Unit(s) SubCutaneous every 12 hours  insulin glargine Injectable (LANTUS) 4 Unit(s) SubCutaneous at bedtime  insulin lispro (HumaLOG) corrective regimen sliding scale   SubCutaneous three times a day before meals  insulin lispro (HumaLOG) corrective regimen sliding scale   SubCutaneous at bedtime  pantoprazole    Tablet 40 milliGRAM(s) Oral before breakfast  saccharomyces boulardii 250 milliGRAM(s) Oral two times a day  sacubitril 49 mG/valsartan 51 mG 1 Tablet(s) Oral daily  senna 2 Tablet(s) Oral at bedtime    MEDICATIONS  (PRN):  acetaminophen   Tablet .. 650 milliGRAM(s) Oral every 6 hours PRN Temp greater or equal to 38C (100.4F), Mild Pain (1 - 3)  dextrose 40% Gel 15 Gram(s) Oral once PRN Blood Glucose LESS THAN 70 milliGRAM(s)/deciliter  glucagon  Injectable 1 milliGRAM(s) IntraMuscular once PRN Glucose LESS THAN 70 milligrams/deciliter  HYDROmorphone  Injectable 0.5 milliGRAM(s) IV Push every 6 hours PRN Severe Pain (7 - 10)  ibuprofen  Tablet. 600 milliGRAM(s) Oral every 8 hours PRN Moderate Pain (4 - 6), Severe Pain (7 - 10)    RADIOLOGY & ADDITIONAL TESTS:
NEPHROLOGY INTERVAL HPI/OVERNIGHT EVENTS:    Feels better   uneventful HD yesterday   Leg with Ancef     MEDICATIONS  (STANDING):  amLODIPine   Tablet 2.5 milliGRAM(s) Oral daily  aspirin  chewable 81 milliGRAM(s) Oral daily  calcitriol   Capsule 0.25 MICROGram(s) Oral daily  calcium carbonate   1250 mG (OsCal) 1 Tablet(s) Oral three times a day  carvedilol 12.5 milliGRAM(s) Oral every 12 hours  ceFAZolin   IVPB 1000 milliGRAM(s) IV Intermittent every 8 hours  dextrose 5%. 1000 milliLiter(s) (50 mL/Hr) IV Continuous <Continuous>  dextrose 50% Injectable 12.5 Gram(s) IV Push once  dextrose 50% Injectable 25 Gram(s) IV Push once  dextrose 50% Injectable 25 Gram(s) IV Push once  docusate sodium 100 milliGRAM(s) Oral daily  heparin  Injectable 5000 Unit(s) SubCutaneous every 12 hours  insulin glargine Injectable (LANTUS) 4 Unit(s) SubCutaneous at bedtime  insulin lispro (HumaLOG) corrective regimen sliding scale   SubCutaneous three times a day before meals  insulin lispro (HumaLOG) corrective regimen sliding scale   SubCutaneous at bedtime  pantoprazole    Tablet 40 milliGRAM(s) Oral before breakfast  saccharomyces boulardii 250 milliGRAM(s) Oral two times a day  sacubitril 49 mG/valsartan 51 mG 1 Tablet(s) Oral daily  senna 2 Tablet(s) Oral at bedtime    MEDICATIONS  (PRN):  acetaminophen   Tablet .. 650 milliGRAM(s) Oral every 6 hours PRN Temp greater or equal to 38C (100.4F), Mild Pain (1 - 3)  dextrose 40% Gel 15 Gram(s) Oral once PRN Blood Glucose LESS THAN 70 milliGRAM(s)/deciliter  glucagon  Injectable 1 milliGRAM(s) IntraMuscular once PRN Glucose LESS THAN 70 milligrams/deciliter  HYDROmorphone  Injectable 0.5 milliGRAM(s) IV Push every 6 hours PRN Severe Pain (7 - 10)  ibuprofen  Tablet. 600 milliGRAM(s) Oral every 8 hours PRN Moderate Pain (4 - 6), Severe Pain (7 - 10)      Allergies    No Known Allergies    Intolerances            Vital Signs Last 24 Hrs  T(C): 36.8 (02 May 2019 07:43), Max: 37.1 (01 May 2019 22:15)  T(F): 98.2 (02 May 2019 07:43), Max: 98.7 (01 May 2019 22:15)  HR: 71 (02 May 2019 07:43) (71 - 80)  BP: 161/79 (02 May 2019 07:43) (161/79 - 170/90)  BP(mean): --  RR: 18 (02 May 2019 07:43) (16 - 18)  SpO2: 97% (02 May 2019 07:43) (95% - 97%)  Daily     Daily   I&O's Detail    01 May 2019 07:01  -  02 May 2019 07:00  --------------------------------------------------------  IN:  Total IN: 0 mL    OUT:    Other: 2700 mL  Total OUT: 2700 mL    Total NET: -2700 mL        I&O's Summary    01 May 2019 07:01  -  02 May 2019 07:00  --------------------------------------------------------  IN: 0 mL / OUT: 2700 mL / NET: -2700 mL        PHYSICAL EXAM:    HEAD:  NCAT  NECK: Supple, +JVD  NERVOUS SYSTEM:  Alert & Oriented X3  CHEST/LUNG:EAE , clear   HEART: Regular rate and rhythm; No rub  ABDOMEN: Soft, Nontender, + ascites   EXTREMITIES:  LLE less edema , redness and pain     LABS:                        11.2   5.1   )-----------( 244      ( 02 May 2019 10:25 )             37.3     05-02    137  |  93<L>  |  21.0<H>  ----------------------------<  189<H>  4.2   |  26.0  |  3.33<H>    Ca    9.5      02 May 2019 10:25    TPro  7.6  /  Alb  3.5  /  TBili  0.8  /  DBili  x   /  AST  11  /  ALT  <5  /  AlkPhos  134<H>  05-02    PT/INR - ( 01 May 2019 14:04 )   PT: 14.8 sec;   INR: 1.28 ratio         PTT - ( 01 May 2019 14:04 )  PTT:29.7 sec            RADIOLOGY & ADDITIONAL TESTS:
NEPHROLOGY INTERVAL HPI/OVERNIGHT EVENTS:  pt comfortable when seen earlier  no acute distress  no cp, sob, n/v/d    MEDICATIONS  (STANDING):  amLODIPine   Tablet 2.5 milliGRAM(s) Oral daily  aspirin  chewable 81 milliGRAM(s) Oral daily  calcitriol   Capsule 0.25 MICROGram(s) Oral daily  calcium carbonate   1250 mG (OsCal) 1 Tablet(s) Oral three times a day  carvedilol 12.5 milliGRAM(s) Oral every 12 hours  ceFAZolin   IVPB 1000 milliGRAM(s) IV Intermittent every 24 hours  dextrose 5%. 1000 milliLiter(s) (50 mL/Hr) IV Continuous <Continuous>  dextrose 50% Injectable 12.5 Gram(s) IV Push once  dextrose 50% Injectable 25 Gram(s) IV Push once  dextrose 50% Injectable 25 Gram(s) IV Push once  docusate sodium 100 milliGRAM(s) Oral daily  heparin  Injectable 5000 Unit(s) SubCutaneous every 12 hours  insulin glargine Injectable (LANTUS) 4 Unit(s) SubCutaneous at bedtime  insulin lispro (HumaLOG) corrective regimen sliding scale   SubCutaneous three times a day before meals  insulin lispro (HumaLOG) corrective regimen sliding scale   SubCutaneous at bedtime  pantoprazole    Tablet 40 milliGRAM(s) Oral before breakfast  saccharomyces boulardii 250 milliGRAM(s) Oral two times a day  sacubitril 49 mG/valsartan 51 mG 1 Tablet(s) Oral daily  senna 2 Tablet(s) Oral at bedtime    MEDICATIONS  (PRN):  acetaminophen   Tablet .. 650 milliGRAM(s) Oral every 6 hours PRN Temp greater or equal to 38C (100.4F), Mild Pain (1 - 3)  dextrose 40% Gel 15 Gram(s) Oral once PRN Blood Glucose LESS THAN 70 milliGRAM(s)/deciliter  glucagon  Injectable 1 milliGRAM(s) IntraMuscular once PRN Glucose LESS THAN 70 milligrams/deciliter  HYDROmorphone  Injectable 0.5 milliGRAM(s) IV Push every 6 hours PRN Severe Pain (7 - 10)  ibuprofen  Tablet. 600 milliGRAM(s) Oral every 8 hours PRN Moderate Pain (4 - 6), Severe Pain (7 - 10)      Allergies    No Known Allergies        Vital Signs Last 24 Hrs  T(C): 36.6 (03 May 2019 09:10), Max: 37 (03 May 2019 07:58)  T(F): 97.8 (03 May 2019 09:10), Max: 98.6 (03 May 2019 07:58)  HR: 72 (03 May 2019 09:10) (67 - 72)  BP: 126/76 (03 May 2019 09:10) (126/64 - 148/78)  BP(mean): --  RR: 17 (03 May 2019 09:10) (17 - 18)  SpO2: 97% (03 May 2019 07:58) (92% - 97%)    PHYSICAL EXAM:  HEAD:  NCAT  NECK: Supple, +JVD  NERVOUS SYSTEM:  Alert & Oriented X3; non focal  CHEST: Clear bilaterally  HEART: Regular rate and rhythm; No rub  ABDOMEN: Soft, Nontender, + ascites   EXTREMITIES:  L LE less edema; erythema improved     LABS:                        11.2   5.1   )-----------( 244      ( 02 May 2019 10:25 )             37.3     05-02    137  |  93<L>  |  21.0<H>  ----------------------------<  189<H>  4.2   |  26.0  |  3.33<H>    Ca    9.5      02 May 2019 10:25    TPro  7.6  /  Alb  3.5  /  TBili  0.8  /  DBili  x   /  AST  11  /  ALT  <5  /  AlkPhos  134<H>  05-02    PT/INR - ( 01 May 2019 14:04 )   PT: 14.8 sec;   INR: 1.28 ratio         PTT - ( 01 May 2019 14:04 )  PTT:29.7 sec        RADIOLOGY & ADDITIONAL TESTS:
NEPHROLOGY INTERVAL HPI/OVERNIGHT EVENTS:  pt comfortable when seen this AM  no cp, sob, n/v/d  tolerating diet    MEDICATIONS  (STANDING):  amLODIPine   Tablet 2.5 milliGRAM(s) Oral daily  aspirin  chewable 81 milliGRAM(s) Oral daily  calcitriol   Capsule 0.25 MICROGram(s) Oral daily  calcium carbonate   1250 mG (OsCal) 1 Tablet(s) Oral three times a day  carvedilol 12.5 milliGRAM(s) Oral every 12 hours  dextrose 5%. 1000 milliLiter(s) (50 mL/Hr) IV Continuous <Continuous>  dextrose 50% Injectable 12.5 Gram(s) IV Push once  dextrose 50% Injectable 25 Gram(s) IV Push once  dextrose 50% Injectable 25 Gram(s) IV Push once  docusate sodium 100 milliGRAM(s) Oral daily  doxycycline hyclate Capsule 100 milliGRAM(s) Oral two times a day  heparin  Injectable 5000 Unit(s) SubCutaneous every 12 hours  insulin glargine Injectable (LANTUS) 4 Unit(s) SubCutaneous at bedtime  insulin lispro (HumaLOG) corrective regimen sliding scale   SubCutaneous three times a day before meals  insulin lispro (HumaLOG) corrective regimen sliding scale   SubCutaneous at bedtime  pantoprazole    Tablet 40 milliGRAM(s) Oral before breakfast  saccharomyces boulardii 250 milliGRAM(s) Oral two times a day  sacubitril 49 mG/valsartan 51 mG 1 Tablet(s) Oral daily  senna 2 Tablet(s) Oral at bedtime    MEDICATIONS  (PRN):  acetaminophen   Tablet .. 650 milliGRAM(s) Oral every 6 hours PRN Temp greater or equal to 38C (100.4F), Mild Pain (1 - 3)  dextrose 40% Gel 15 Gram(s) Oral once PRN Blood Glucose LESS THAN 70 milliGRAM(s)/deciliter  glucagon  Injectable 1 milliGRAM(s) IntraMuscular once PRN Glucose LESS THAN 70 milligrams/deciliter  ibuprofen  Tablet. 600 milliGRAM(s) Oral every 8 hours PRN Moderate Pain (4 - 6), Severe Pain (7 - 10)  oxyCODONE    IR 5 milliGRAM(s) Oral every 6 hours PRN Moderate Pain (4 - 6)      Allergies    No Known Allergies          Vital Signs Last 24 Hrs  T(C): 36.8 (06 May 2019 08:14), Max: 36.9 (05 May 2019 23:29)  T(F): 98.2 (06 May 2019 08:14), Max: 98.5 (05 May 2019 23:29)  HR: 77 (06 May 2019 08:14) (67 - 77)  BP: 167/78 (06 May 2019 08:14) (126/70 - 167/78)  BP(mean): --  RR: 18 (06 May 2019 08:14) (18 - 18)  SpO2: 97% (06 May 2019 08:14) (97% - 98%)    PHYSICAL EXAM:  NAD  NECK: Supple, no JVD  NERVOUS SYSTEM:  Alert & Oriented X3; non focal  CHEST: Clear bilaterally  HEART: Regular rate and rhythm; No rub  ABDOMEN: Soft, Nontender, + ascites   EXTREMITIES:  tr LE less edema; erythema resolved and pain minimal    LABS:                        11.5   3.8   )-----------( 270      ( 05 May 2019 09:27 )             36.6     05-05    131<L>  |  89<L>  |  41.0<H>  ----------------------------<  141<H>  5.4<H>   |  24.0  |  5.14<H>    Ca    9.7      05 May 2019 09:27              RADIOLOGY & ADDITIONAL TESTS:
NEPHROLOGY INTERVAL HPI/OVERNIGHT EVENTS:  pt continues to do well  tolerated HD yesterday  no cp, sob, n/v/d    MEDICATIONS  (STANDING):  amLODIPine   Tablet 2.5 milliGRAM(s) Oral daily  aspirin  chewable 81 milliGRAM(s) Oral daily  calcitriol   Capsule 0.25 MICROGram(s) Oral daily  calcium carbonate   1250 mG (OsCal) 1 Tablet(s) Oral three times a day  carvedilol 12.5 milliGRAM(s) Oral every 12 hours  dextrose 5%. 1000 milliLiter(s) (50 mL/Hr) IV Continuous <Continuous>  dextrose 50% Injectable 12.5 Gram(s) IV Push once  dextrose 50% Injectable 25 Gram(s) IV Push once  dextrose 50% Injectable 25 Gram(s) IV Push once  docusate sodium 100 milliGRAM(s) Oral daily  gabapentin 300 milliGRAM(s) Oral once  heparin  Injectable 5000 Unit(s) SubCutaneous every 12 hours  insulin glargine Injectable (LANTUS) 4 Unit(s) SubCutaneous at bedtime  insulin lispro (HumaLOG) corrective regimen sliding scale   SubCutaneous three times a day before meals  insulin lispro (HumaLOG) corrective regimen sliding scale   SubCutaneous at bedtime  pantoprazole    Tablet 40 milliGRAM(s) Oral before breakfast  saccharomyces boulardii 250 milliGRAM(s) Oral two times a day  sacubitril 49 mG/valsartan 51 mG 1 Tablet(s) Oral daily  senna 2 Tablet(s) Oral at bedtime  vancomycin  IVPB 1000 milliGRAM(s) IV Intermittent once    MEDICATIONS  (PRN):  acetaminophen   Tablet .. 650 milliGRAM(s) Oral every 6 hours PRN Temp greater or equal to 38C (100.4F), Mild Pain (1 - 3)  dextrose 40% Gel 15 Gram(s) Oral once PRN Blood Glucose LESS THAN 70 milliGRAM(s)/deciliter  glucagon  Injectable 1 milliGRAM(s) IntraMuscular once PRN Glucose LESS THAN 70 milligrams/deciliter  HYDROmorphone  Injectable 0.5 milliGRAM(s) IV Push every 6 hours PRN Severe Pain (7 - 10)  ibuprofen  Tablet. 600 milliGRAM(s) Oral every 8 hours PRN Moderate Pain (4 - 6), Severe Pain (7 - 10)      Allergies    No Known Allergies      Vital Signs Last 24 Hrs  T(C): 36.8 (04 May 2019 07:49), Max: 37 (04 May 2019 00:10)  T(F): 98.2 (04 May 2019 07:49), Max: 98.6 (04 May 2019 00:10)  HR: 70 (04 May 2019 07:49) (68 - 75)  BP: 152/83 (04 May 2019 07:49) (122/70 - 152/83)  BP(mean): --  RR: 18 (04 May 2019 07:49) (17 - 18)  SpO2: 96% (04 May 2019 07:49) (92% - 96%)    PHYSICAL EXAM:  Asymptomatic  NECK: Supple, +JVD  NERVOUS SYSTEM:  Alert & Oriented X3; non focal  CHEST: Clear bilaterally  HEART: Regular rate and rhythm; No rub  ABDOMEN: Soft, Nontender, + ascites   EXTREMITIES:  tr LE less edema; erythema improved    LABS:                        11.2   5.1   )-----------( 244      ( 02 May 2019 10:25 )             37.3     05-02    137  |  93<L>  |  21.0<H>  ----------------------------<  189<H>  4.2   |  26.0  |  3.33<H>    Ca    9.5      02 May 2019 10:25    TPro  7.6  /  Alb  3.5  /  TBili  0.8  /  DBili  x   /  AST  11  /  ALT  <5  /  AlkPhos  134<H>  05-02            RADIOLOGY & ADDITIONAL TESTS:
NEPHROLOGY INTERVAL HPI/OVERNIGHT EVENTS:  pt remains well  no acute distress; eating breakfast    MEDICATIONS  (STANDING):  amLODIPine   Tablet 2.5 milliGRAM(s) Oral daily  aspirin  chewable 81 milliGRAM(s) Oral daily  calcitriol   Capsule 0.25 MICROGram(s) Oral daily  calcium carbonate   1250 mG (OsCal) 1 Tablet(s) Oral three times a day  carvedilol 12.5 milliGRAM(s) Oral every 12 hours  dextrose 5%. 1000 milliLiter(s) (50 mL/Hr) IV Continuous <Continuous>  dextrose 50% Injectable 12.5 Gram(s) IV Push once  dextrose 50% Injectable 25 Gram(s) IV Push once  dextrose 50% Injectable 25 Gram(s) IV Push once  docusate sodium 100 milliGRAM(s) Oral daily  heparin  Injectable 5000 Unit(s) SubCutaneous every 12 hours  insulin glargine Injectable (LANTUS) 4 Unit(s) SubCutaneous at bedtime  insulin lispro (HumaLOG) corrective regimen sliding scale   SubCutaneous three times a day before meals  insulin lispro (HumaLOG) corrective regimen sliding scale   SubCutaneous at bedtime  pantoprazole    Tablet 40 milliGRAM(s) Oral before breakfast  saccharomyces boulardii 250 milliGRAM(s) Oral two times a day  sacubitril 49 mG/valsartan 51 mG 1 Tablet(s) Oral daily  senna 2 Tablet(s) Oral at bedtime    MEDICATIONS  (PRN):  acetaminophen   Tablet .. 650 milliGRAM(s) Oral every 6 hours PRN Temp greater or equal to 38C (100.4F), Mild Pain (1 - 3)  dextrose 40% Gel 15 Gram(s) Oral once PRN Blood Glucose LESS THAN 70 milliGRAM(s)/deciliter  glucagon  Injectable 1 milliGRAM(s) IntraMuscular once PRN Glucose LESS THAN 70 milligrams/deciliter  ibuprofen  Tablet. 600 milliGRAM(s) Oral every 8 hours PRN Moderate Pain (4 - 6), Severe Pain (7 - 10)  oxyCODONE    IR 5 milliGRAM(s) Oral every 6 hours PRN Moderate Pain (4 - 6)      Allergies    No Known Allergies    Intolerances            Vital Signs Last 24 Hrs  T(C): 36.7 (05 May 2019 07:35), Max: 37 (04 May 2019 23:50)  T(F): 98.1 (05 May 2019 07:35), Max: 98.6 (04 May 2019 23:50)  HR: 69 (05 May 2019 07:35) (69 - 73)  BP: 141/80 (05 May 2019 07:35) (141/80 - 152/78)  BP(mean): --  RR: 18 (05 May 2019 07:35) (18 - 18)  SpO2: 92% (05 May 2019 07:35) (92% - 98%)    PHYSICAL EXAM:  NAD  NECK: Supple, no JVD  NERVOUS SYSTEM:  Alert & Oriented X3; non focal  CHEST: Clear bilaterally  HEART: Regular rate and rhythm; No rub  ABDOMEN: Soft, Nontender, + ascites   EXTREMITIES:  tr LE less edema; erythema improved      LABS:    Potassium, Serum: 4.2 mmol/L (05.02.19 @ 10:25)    Hemoglobin: 11.2 g/dL (05.02.19 @ 10:25)                  RADIOLOGY & ADDITIONAL TESTS:
Patient is a 58y old  Female who presents with a chief complaint of     HPI: Presented to ER with pain and swelling in left lower leg   Also warm and tender   Missed HD in [place] this am       PAST MEDICAL & SURGICAL HISTORY:  Coronary artery disease, angina presence unspecified, unspecified vessel or lesion type, unspecified whether native or transplanted heart  Paroxysmal atrial fibrillation  Anemia due to chronic kidney disease, unspecified CKD stage  Chronic systolic congestive heart failure  Pleural effusion  Pericardial effusion  End stage renal disease on dialysis  HLD (hyperlipidemia)  HTN (hypertension)  DM (diabetes mellitus)  A-V fistula  Encounter for dialysis catheter care      FAMILY HISTORY:  Family history of kidney disease in brother (Sibling)      Social History:    MEDICATIONS  (STANDING):    MEDICATIONS  (PRN):      Allergies    No Known Allergies    Intolerances      Vital Signs Last 24 Hrs  T(C): 37.1 (01 May 2019 12:12), Max: 37.1 (01 May 2019 12:12)  T(F): 98.8 (01 May 2019 12:12), Max: 98.8 (01 May 2019 12:12)  HR: 80 (01 May 2019 12:12) (80 - 80)  BP: 165/92 (01 May 2019 12:12) (165/92 - 165/92)  BP(mean): --  RR: 14 (01 May 2019 12:12) (14 - 14)  SpO2: 99% (01 May 2019 12:12) (99% - 99%)  Daily Height in cm: 165.1 (01 May 2019 12:12)    Daily   I&O's Detail    I&O's Summary      PHYSICAL EXAM:  HEAD:  NCAT  NECK: Supple, +JVD  NERVOUS SYSTEM:  Alert & Oriented X3  CHEST/LUNG:EAE , clear   HEART: Regular rate and rhythm; No rub  ABDOMEN: Soft, Nontender, + ascites   EXTREMITIES:  + edema       LABS:                        11.3   5.3   )-----------( 247      ( 01 May 2019 13:17 )             37.1     05-01    132<L>  |  87<L>  |  34.0<H>  ----------------------------<  222<H>  5.1   |  25.0  |  4.92<H>    Ca    10.2      01 May 2019 14:03    TPro  8.0  /  Alb  3.8  /  TBili  0.8  /  DBili  x   /  AST  14  /  ALT  <5  /  AlkPhos  154<H>  05-01    PT/INR - ( 01 May 2019 14:04 )   PT: 14.8 sec;   INR: 1.28 ratio         PTT - ( 01 May 2019 14:04 )  PTT:29.7 sec       EXAM:  US DPLX LWR EXT VEINS LTD LT                          PROCEDURE DATE:  05/01/2019          INTERPRETATION:  INDICATION: Left lower extremity swelling and pain  COMPARISON: Prior studies including lower extremity ultrasound dated   08/30/2018    COMMENT: Real-time compression, duplex, and color Doppler was performed   of the left lower extremity.     FINDINGS: The left common femoral, femoral, and popliteal veins are   normally compressible with normal spontaneous and phasic flow. The left   calf veins where visualized demonstrate normal Doppler flow. Incidentally   noted is a lymph node in the left groin with a home appearing fatty hilum   measuring 6 mm in short axis. There is a Baker's cyst in left popliteal   fossa measuring up to 2.3 cm.    IMPRESSION:   No evidence of acute left femoropopliteal deep venous thrombosis.    Left Baker's cyst, as described above.               EXAM:  TIBIA FIBULA-LEFT                          PROCEDURE DATE:  05/01/2019          INTERPRETATION:  Radiographs of the LEFT tibia and fibula         CLINICAL INFORMATION:  Injury with  Pain.    TECHNIQUE:  Frontal and lateral views of the tibia and fibula were   obtained.    FINDINGS:  No prior studies are available for review.    The tibia and fibula appear intact.   No fracture is seen.   No soft   tissue abnormalities are seen.    IMPRESSION :   No acute radiographic osseous pathology..                    NISHA ZAVALA M.D., ATTENDING RADIOLOGIST  This document has been electronically signed. May  1 2019  4:33PM            SHANE SAPP M.D., ATTENDING RADIOLOGIST  This document has been electronically signed. May  1 2019 2:20PM                RADIOLOGY & ADDITIONAL TESTS:

## 2019-05-06 NOTE — DISCHARGE NOTE NURSING/CASE MANAGEMENT/SOCIAL WORK - NSDCPEPT PROEDHF_GEN_ALL_CORE
Monitor weight daily/Report signs and symptoms to primary care provider/Activities as tolerated/Low salt diet/Call primary care provider for follow up after discharge

## 2019-05-06 NOTE — PROGRESS NOTE ADULT - ASSESSMENT
ESRD:  - HD today    Anemia - Hgb stable   - cont to maintain off MARGI now  - trend H/H    RO:   - cont Calcitriol and CaCO3  - monitor phos

## 2019-05-06 NOTE — DISCHARGE NOTE NURSING/CASE MANAGEMENT/SOCIAL WORK - NSSCNAMETXT_GEN_ALL_CORE
[facility] [phone] / [facility] [phone] X 240- RESUME CDPAP AIDE

## 2019-05-29 DIAGNOSIS — Z71.89 OTHER SPECIFIED COUNSELING: ICD-10-CM

## 2019-07-19 ENCOUNTER — INPATIENT (INPATIENT)
Facility: HOSPITAL | Age: 59
LOS: 1 days | Discharge: HOME | DRG: 602 | End: 2019-07-21
Attending: HOSPITALIST | Admitting: GENERAL ACUTE CARE HOSPITAL
Payer: MEDICARE

## 2019-07-19 VITALS
HEIGHT: 65 IN | TEMPERATURE: 99 F | RESPIRATION RATE: 16 BRPM | WEIGHT: 149.91 LBS | DIASTOLIC BLOOD PRESSURE: 88 MMHG | OXYGEN SATURATION: 95 % | SYSTOLIC BLOOD PRESSURE: 159 MMHG | HEART RATE: 80 BPM

## 2019-07-19 DIAGNOSIS — L03.116 CELLULITIS OF LEFT LOWER LIMB: ICD-10-CM

## 2019-07-19 DIAGNOSIS — Z49.01 ENCOUNTER FOR FITTING AND ADJUSTMENT OF EXTRACORPOREAL DIALYSIS CATHETER: Chronic | ICD-10-CM

## 2019-07-19 DIAGNOSIS — I77.0 ARTERIOVENOUS FISTULA, ACQUIRED: Chronic | ICD-10-CM

## 2019-07-19 LAB
ALBUMIN SERPL ELPH-MCNC: 3.8 G/DL — SIGNIFICANT CHANGE UP (ref 3.3–5.2)
ALP SERPL-CCNC: 152 U/L — HIGH (ref 40–120)
ALT FLD-CCNC: 7 U/L — SIGNIFICANT CHANGE UP
ANION GAP SERPL CALC-SCNC: 16 MMOL/L — SIGNIFICANT CHANGE UP (ref 5–17)
APTT BLD: 33.8 SEC — SIGNIFICANT CHANGE UP (ref 27.5–36.3)
AST SERPL-CCNC: 16 U/L — SIGNIFICANT CHANGE UP
BASOPHILS # BLD AUTO: 0.03 K/UL — SIGNIFICANT CHANGE UP (ref 0–0.2)
BASOPHILS NFR BLD AUTO: 0.5 % — SIGNIFICANT CHANGE UP (ref 0–2)
BILIRUB SERPL-MCNC: 0.7 MG/DL — SIGNIFICANT CHANGE UP (ref 0.4–2)
BUN SERPL-MCNC: 39 MG/DL — HIGH (ref 8–20)
CALCIUM SERPL-MCNC: 9.6 MG/DL — SIGNIFICANT CHANGE UP (ref 8.6–10.2)
CHLORIDE SERPL-SCNC: 92 MMOL/L — LOW (ref 98–107)
CO2 SERPL-SCNC: 25 MMOL/L — SIGNIFICANT CHANGE UP (ref 22–29)
CREAT SERPL-MCNC: 5.31 MG/DL — HIGH (ref 0.5–1.3)
EOSINOPHIL # BLD AUTO: 0.19 K/UL — SIGNIFICANT CHANGE UP (ref 0–0.5)
EOSINOPHIL NFR BLD AUTO: 2.9 % — SIGNIFICANT CHANGE UP (ref 0–6)
GLUCOSE BLDC GLUCOMTR-MCNC: 169 MG/DL — HIGH (ref 70–99)
GLUCOSE SERPL-MCNC: 240 MG/DL — HIGH (ref 70–115)
HCT VFR BLD CALC: 33.5 % — LOW (ref 34.5–45)
HGB BLD-MCNC: 10.2 G/DL — LOW (ref 11.5–15.5)
IMM GRANULOCYTES NFR BLD AUTO: 0.3 % — SIGNIFICANT CHANGE UP (ref 0–1.5)
INR BLD: 1.39 RATIO — HIGH (ref 0.88–1.16)
LYMPHOCYTES # BLD AUTO: 0.61 K/UL — LOW (ref 1–3.3)
LYMPHOCYTES # BLD AUTO: 9.3 % — LOW (ref 13–44)
MCHC RBC-ENTMCNC: 27.2 PG — SIGNIFICANT CHANGE UP (ref 27–34)
MCHC RBC-ENTMCNC: 30.4 GM/DL — LOW (ref 32–36)
MCV RBC AUTO: 89.3 FL — SIGNIFICANT CHANGE UP (ref 80–100)
MONOCYTES # BLD AUTO: 0.61 K/UL — SIGNIFICANT CHANGE UP (ref 0–0.9)
MONOCYTES NFR BLD AUTO: 9.3 % — SIGNIFICANT CHANGE UP (ref 2–14)
NEUTROPHILS # BLD AUTO: 5.1 K/UL — SIGNIFICANT CHANGE UP (ref 1.8–7.4)
NEUTROPHILS NFR BLD AUTO: 77.7 % — HIGH (ref 43–77)
NT-PROBNP SERPL-SCNC: HIGH PG/ML (ref 0–300)
PLATELET # BLD AUTO: 250 K/UL — SIGNIFICANT CHANGE UP (ref 150–400)
POTASSIUM SERPL-MCNC: 4.8 MMOL/L — SIGNIFICANT CHANGE UP (ref 3.5–5.3)
POTASSIUM SERPL-SCNC: 4.8 MMOL/L — SIGNIFICANT CHANGE UP (ref 3.5–5.3)
PROT SERPL-MCNC: 8.3 G/DL — SIGNIFICANT CHANGE UP (ref 6.6–8.7)
PROTHROM AB SERPL-ACNC: 16.1 SEC — HIGH (ref 10–12.9)
RBC # BLD: 3.75 M/UL — LOW (ref 3.8–5.2)
RBC # FLD: 15.9 % — HIGH (ref 10.3–14.5)
SODIUM SERPL-SCNC: 133 MMOL/L — LOW (ref 135–145)
TROPONIN T SERPL-MCNC: 0.58 NG/ML — HIGH (ref 0–0.06)
WBC # BLD: 6.56 K/UL — SIGNIFICANT CHANGE UP (ref 3.8–10.5)
WBC # FLD AUTO: 6.56 K/UL — SIGNIFICANT CHANGE UP (ref 3.8–10.5)

## 2019-07-19 PROCEDURE — 71045 X-RAY EXAM CHEST 1 VIEW: CPT | Mod: 26

## 2019-07-19 PROCEDURE — 93970 EXTREMITY STUDY: CPT | Mod: 26

## 2019-07-19 PROCEDURE — 73590 X-RAY EXAM OF LOWER LEG: CPT | Mod: 26,LT

## 2019-07-19 PROCEDURE — 99285 EMERGENCY DEPT VISIT HI MDM: CPT

## 2019-07-19 PROCEDURE — 99223 1ST HOSP IP/OBS HIGH 75: CPT

## 2019-07-19 RX ORDER — CEFTRIAXONE 500 MG/1
1000 INJECTION, POWDER, FOR SOLUTION INTRAMUSCULAR; INTRAVENOUS EVERY 24 HOURS
Refills: 0 | Status: DISCONTINUED | OUTPATIENT
Start: 2019-07-19 | End: 2019-07-20

## 2019-07-19 RX ORDER — INSULIN GLARGINE 100 [IU]/ML
4 INJECTION, SOLUTION SUBCUTANEOUS AT BEDTIME
Refills: 0 | Status: DISCONTINUED | OUTPATIENT
Start: 2019-07-19 | End: 2019-07-21

## 2019-07-19 RX ORDER — OXYCODONE AND ACETAMINOPHEN 5; 325 MG/1; MG/1
1 TABLET ORAL EVERY 6 HOURS
Refills: 0 | Status: DISCONTINUED | OUTPATIENT
Start: 2019-07-19 | End: 2019-07-21

## 2019-07-19 RX ORDER — GLUCAGON INJECTION, SOLUTION 0.5 MG/.1ML
1 INJECTION, SOLUTION SUBCUTANEOUS ONCE
Refills: 0 | Status: DISCONTINUED | OUTPATIENT
Start: 2019-07-19 | End: 2019-07-21

## 2019-07-19 RX ORDER — DOCUSATE SODIUM 100 MG
100 CAPSULE ORAL
Refills: 0 | Status: DISCONTINUED | OUTPATIENT
Start: 2019-07-19 | End: 2019-07-21

## 2019-07-19 RX ORDER — DEXTROSE 50 % IN WATER 50 %
12.5 SYRINGE (ML) INTRAVENOUS ONCE
Refills: 0 | Status: DISCONTINUED | OUTPATIENT
Start: 2019-07-19 | End: 2019-07-21

## 2019-07-19 RX ORDER — DEXTROSE 50 % IN WATER 50 %
25 SYRINGE (ML) INTRAVENOUS ONCE
Refills: 0 | Status: DISCONTINUED | OUTPATIENT
Start: 2019-07-19 | End: 2019-07-21

## 2019-07-19 RX ORDER — AMLODIPINE BESYLATE 2.5 MG/1
2.5 TABLET ORAL DAILY
Refills: 0 | Status: DISCONTINUED | OUTPATIENT
Start: 2019-07-19 | End: 2019-07-21

## 2019-07-19 RX ORDER — ASPIRIN/CALCIUM CARB/MAGNESIUM 324 MG
81 TABLET ORAL DAILY
Refills: 0 | Status: DISCONTINUED | OUTPATIENT
Start: 2019-07-19 | End: 2019-07-21

## 2019-07-19 RX ORDER — ACETAMINOPHEN 500 MG
650 TABLET ORAL EVERY 6 HOURS
Refills: 0 | Status: DISCONTINUED | OUTPATIENT
Start: 2019-07-19 | End: 2019-07-21

## 2019-07-19 RX ORDER — ASCORBIC ACID 60 MG
500 TABLET,CHEWABLE ORAL DAILY
Refills: 0 | Status: DISCONTINUED | OUTPATIENT
Start: 2019-07-19 | End: 2019-07-21

## 2019-07-19 RX ORDER — PANTOPRAZOLE SODIUM 20 MG/1
40 TABLET, DELAYED RELEASE ORAL
Refills: 0 | Status: DISCONTINUED | OUTPATIENT
Start: 2019-07-19 | End: 2019-07-21

## 2019-07-19 RX ORDER — OXYCODONE AND ACETAMINOPHEN 5; 325 MG/1; MG/1
2 TABLET ORAL EVERY 6 HOURS
Refills: 0 | Status: DISCONTINUED | OUTPATIENT
Start: 2019-07-19 | End: 2019-07-21

## 2019-07-19 RX ORDER — SODIUM CHLORIDE 9 MG/ML
1000 INJECTION, SOLUTION INTRAVENOUS
Refills: 0 | Status: DISCONTINUED | OUTPATIENT
Start: 2019-07-19 | End: 2019-07-21

## 2019-07-19 RX ORDER — INSULIN LISPRO 100/ML
2 VIAL (ML) SUBCUTANEOUS
Refills: 0 | Status: DISCONTINUED | OUTPATIENT
Start: 2019-07-19 | End: 2019-07-21

## 2019-07-19 RX ORDER — DEXTROSE 50 % IN WATER 50 %
15 SYRINGE (ML) INTRAVENOUS ONCE
Refills: 0 | Status: DISCONTINUED | OUTPATIENT
Start: 2019-07-19 | End: 2019-07-21

## 2019-07-19 RX ORDER — FERROUS SULFATE 325(65) MG
325 TABLET ORAL DAILY
Refills: 0 | Status: DISCONTINUED | OUTPATIENT
Start: 2019-07-19 | End: 2019-07-21

## 2019-07-19 RX ORDER — SENNA PLUS 8.6 MG/1
2 TABLET ORAL AT BEDTIME
Refills: 0 | Status: DISCONTINUED | OUTPATIENT
Start: 2019-07-19 | End: 2019-07-21

## 2019-07-19 RX ORDER — CALCITRIOL 0.5 UG/1
0.25 CAPSULE ORAL DAILY
Refills: 0 | Status: DISCONTINUED | OUTPATIENT
Start: 2019-07-19 | End: 2019-07-21

## 2019-07-19 RX ORDER — CARVEDILOL PHOSPHATE 80 MG/1
12.5 CAPSULE, EXTENDED RELEASE ORAL EVERY 12 HOURS
Refills: 0 | Status: DISCONTINUED | OUTPATIENT
Start: 2019-07-19 | End: 2019-07-21

## 2019-07-19 RX ORDER — HEPARIN SODIUM 5000 [USP'U]/ML
5000 INJECTION INTRAVENOUS; SUBCUTANEOUS EVERY 8 HOURS
Refills: 0 | Status: DISCONTINUED | OUTPATIENT
Start: 2019-07-19 | End: 2019-07-21

## 2019-07-19 RX ORDER — SACUBITRIL AND VALSARTAN 24; 26 MG/1; MG/1
1 TABLET, FILM COATED ORAL DAILY
Refills: 0 | Status: DISCONTINUED | OUTPATIENT
Start: 2019-07-19 | End: 2019-07-21

## 2019-07-19 RX ORDER — VANCOMYCIN HCL 1 G
1000 VIAL (EA) INTRAVENOUS ONCE
Refills: 0 | Status: COMPLETED | OUTPATIENT
Start: 2019-07-19 | End: 2019-07-19

## 2019-07-19 RX ORDER — CALCIUM CARBONATE 500(1250)
1 TABLET ORAL THREE TIMES A DAY
Refills: 0 | Status: DISCONTINUED | OUTPATIENT
Start: 2019-07-19 | End: 2019-07-19

## 2019-07-19 RX ADMIN — CARVEDILOL PHOSPHATE 12.5 MILLIGRAM(S): 80 CAPSULE, EXTENDED RELEASE ORAL at 21:03

## 2019-07-19 RX ADMIN — CEFTRIAXONE 100 MILLIGRAM(S): 500 INJECTION, POWDER, FOR SOLUTION INTRAMUSCULAR; INTRAVENOUS at 21:02

## 2019-07-19 RX ADMIN — INSULIN GLARGINE 4 UNIT(S): 100 INJECTION, SOLUTION SUBCUTANEOUS at 21:02

## 2019-07-19 RX ADMIN — SACUBITRIL AND VALSARTAN 1 TABLET(S): 24; 26 TABLET, FILM COATED ORAL at 21:03

## 2019-07-19 RX ADMIN — Medication 250 MILLIGRAM(S): at 13:36

## 2019-07-19 RX ADMIN — HEPARIN SODIUM 5000 UNIT(S): 5000 INJECTION INTRAVENOUS; SUBCUTANEOUS at 21:04

## 2019-07-19 NOTE — ED ADULT NURSE NOTE - OBJECTIVE STATEMENT
pt comes to ED with reports of left lower leg swelling x 3 days, afebrile, reports no chest pain and no SOB, even and unlabored resps present. pt with life vest in place, no HAYES. reports difficulty ambulating due to pain. pt with pedal pulses present and equal bilaterally, increased pain to posterior of left lower leg. subjective fevers yesterday as per nursing aid. pt is due for dialysis today, receives [facility], due this afternoon. pt lungs CTAB on exam.

## 2019-07-19 NOTE — ED PROVIDER NOTE - CLINICAL SUMMARY MEDICAL DECISION MAKING FREE TEXT BOX
pt likely has cellulitis of left leg, will r/o dvt, check for labs, will need dialysis today and likelya dmit, pt had worsenign pain on walkign and ot walkign much now, will also consider xray tib fib left leg

## 2019-07-19 NOTE — CONSULT NOTE ADULT - ATTENDING COMMENTS
VTE  MED REC  Inpatient certification  ID consult [phone]; left message for AM consult w service at 19:05; discussed w ID at 19:19

## 2019-07-19 NOTE — ED PROVIDER NOTE - OBJECTIVE STATEMENT
60 y/o F with h/o htn, hld, esrd on hd, dm on insulin, chf, currently wearing life vest p/w left leg swellign and redness for 2 wks which is progressive and she had it before, no recent travel or sick contact. No fall or trauma. Last HD on wednesday and due today .Pt had sub fever and chills at home but no nausea, vomiting, diarrhea, abd pain, sob, chest pain. ED  Marcia 60 y/o F with h/o htn, hld, esrd on hd, dm on insulin, chf, currently wearing life vest p/w left leg swelling and redness for 2 wks which is progressive and she had it before, no recent travel or sick contact. No fall or trauma. Last HD on wednesday and due today .Pt had sub fever and chills at home but no nausea, vomiting, diarrhea, abd pain, sob, chest pain. ED  Marcia

## 2019-07-19 NOTE — ED ADULT NURSE REASSESSMENT NOTE - NS ED NURSE REASSESS COMMENT FT1
Assumed patient responsibility at 1400. Patient hemodynamically stable. Resting comfortably in stretcher, denies all forms of pain. Will continue to monitor.

## 2019-07-19 NOTE — H&P ADULT - ASSESSMENT
Patient is a 59 year old female with PMH of ESRD on HD (MWF), Chronic HFrEF (EF 35-40% TTE 02/11/19, wears life vest), NICM (cardiac cath 03/18 non-obstructive CAD), paroxysmal Afib (no AC due to GI bleed), Uremic pericarditis s/p pericardiocentesis, Right sided pleural effusion s/p pigtail catheter, IDDM, HTN, Ascites s/p paracentesis on 02/2019, Cellulitis who presents to the ED with chief complain of left lower extremity pain and swelling.     1. LLE Cellulitis  -admit to any bed  -f/u blood cultures  -IV Vanco  -tylenol PRN    2. ESRD on HD (MWF)  -due for HD today, renal consulted  -UF goal per renal  -strict I/Os, daily weights  -avoid nephrotoxic agents and renally dose all medications for eGFR < 10  -renal consult    3. Elevated TNI  -asymptomatic  -likely due to ESRD; TNI was at the same level in Dec 2018  -EKG without acute ischemic changes    4. Chronic HFpEF secondary to NICM  -BNP 70,000  -CXR with congestive changes  -Ultrafiltration per renal  -TTE - EF 35-40% in Feb 2019  -Cardiac cath 03/18 non-obstructive CAD)  -strict I/Os, daily weights  -patient wearing life vest  -outpatient follow up with cardiology    5. Atrial Fibrillation   -not a candidate for AC due to GIB    6. DM  -check HbA1c  -ISS  -ADA diet    7. HTN  -BP elevated  -resume home medications  -low sodium diet    DVT ppx - Heparin SC Patient is a 59 year old female with PMH of ESRD on HD (MWF), Chronic HFrEF (EF 35-40% TTE 02/11/19, wears life vest), NICM (cardiac cath 03/18 non-obstructive CAD), paroxysmal Afib (no AC due to GI bleed), Uremic pericarditis s/p pericardiocentesis, Right sided pleural effusion s/p pigtail catheter, IDDM, HTN, Ascites s/p paracentesis on 02/2019, Cellulitis who presents to the ED with chief complain of left lower extremity pain and swelling.     1. LLE Cellulitis  -admit to any bed  -f/u blood cultures  -s/p IV Vanco; check level in AM and dose level appropriately  -tylenol PRN  -analgesia PRN  -ID consult requested    2. ESRD on HD (MWF)  -due for HD today, renal consulted  -patient not in acute respiratory distress and lytes acceptable  -no urgent indication for RRT tonight; UF goal per renal in AM  -SHPT - check phos with next lab draw; continue calcitriol  -strict I/Os, daily weights  -avoid nephrotoxic agents and renally dose all medications for eGFR < 10  -renal consult - Dr. Aguilar called    3. Elevated TNI  -asymptomatic  -likely due to ESRD; TNI was at the same level in Dec 2018  -EKG without acute ischemic changes    4. Chronic HFpEF secondary to NICM  -BNP 70,000  -CXR with congestive changes  -Ultrafiltration per renal  -Continue coreg and entresto  -TTE - EF 35-40% in Feb 2019  -Cardiac cath 03/18 non-obstructive CAD)  -strict I/Os, daily weights  -patient wearing life vest  -outpatient follow up with cardiology    5. History of Atrial Fibrillation   -continue entresto  -not a candidate for AC due to GIB    6. DM  -check HbA1c  -Lantus and ISS  -ADA diet    7. HTN  -BP elevated  -resume home medications, continue norvasc, coreg and entresto  -low sodium diet    8. History of GIB  -continue PPI    DVT ppx - Heparin SC

## 2019-07-19 NOTE — CONSULT NOTE ADULT - SUBJECTIVE AND OBJECTIVE BOX
59 yr old female w Chronic HFrEF,  DM, ESRD on HD M-W-F, HLD, HTN, PAF (no AC due to GIB) wearing life vest, came to ED c/o LLE swelling and fever     Pt states 4 days  w redness and swelling and feeling feverish w chills  Has had this before  No travel hx    PAST MEDICAL HX  Anemia due to CKD chronic kidney disease  Ascites s/p paracentesisi   Chronic HFrEF 35-40%     CAD coronary artery disease, angina presence   DM (diabetes mellitus)    ESRD end stage renal disease on dialysis    HLD (hyperlipidemia)    HTN (hypertension)  NICM nonischemic cardiomyopathy    PAF paroxysmal atrial fibrillation, no AC due to GIB    Pericardial effusion    Pleural effusion R s/p pigtail catheter  Uremic pericarditis s/p pericardiocentesis    PAST SURGICAL HX  A-V fistula    Cardiac cath  nonobstructive CAD  Encounter for dialysis catheter care  Pericardiocentesis for uremic pericarditis  R pigtail catheter for pleural effusion 59 yr old female w Chronic HFrEF,  DM, ESRD on HD M-W-F, HLD, HTN, PAF (no AC due to GIB) wearing life vest, came to ED c/o LLE swelling and fever     Pt states 4 days  w redness and swelling and feeling feverish w chills.  No injury  Has had this before  No travel hx    PAST MEDICAL HX  Anemia due to CKD chronic kidney disease  Ascites s/p paracentesisi   Chronic HFrEF 35-40%     CAD coronary artery disease, angina presence  Cellulitis LLE    DM (diabetes mellitus)    ESRD end stage renal disease on dialysis    HLD (hyperlipidemia)    HTN (hypertension)  NICM nonischemic cardiomyopathy    PAF paroxysmal atrial fibrillation, no AC due to GIB    Pericardial effusion    Pleural effusion R s/p pigtail catheter  Uremic pericarditis s/p pericardiocentesis    PAST SURGICAL HX  A-V fistula    Cardiac cath  nonobstructive CAD  Encounter for dialysis catheter care  Pericardiocentesis for uremic pericarditis  R pigtail catheter for pleural effusion      FAMILY HX  brother w kidney disease      SOCIAL HX  Never smoked  No alcohol        ROS:  GEN +feverish w chills x 4 days intermittent  RESP no cough or SOB  CARD no pain  GI no pain, no nausea  MSK no joint swelling  Vasc + bilat leg swelling  DERM + redness LLE    T(C): 37.4 (07-19-19 @ 18:08), Max: 37.4 (07-19-19 @ 11:15)  HR: 81 (07-19-19 @ 18:08) (80 - 81)  BP: 160/70 (07-19-19 @ 18:08) (159/88 - 160/70)  RR: 16 (07-19-19 @ 18:08) (16 - 16)  SpO2: 96% (07-19-19 @ 18:08) (95% - 96%)    PHYSICAL EXAM: evaluation precludes physical exam. Pertinent physical exam findings as per video conference with  teleNA at bedside is as follows:    CARDIAC MARKERS ( 19 Jul 2019 13:32 )  x     / 0.58 ng/mL / x     / x     / x           BNP 70, 000                         10.2   6.56  )-----------( 250      ( 19 Jul 2019 13:32 )             33.5     19 Jul 2019 13:32    133    |  92     |  39.0   ----------------------------<  240    4.8     |  25.0   |  5.31     Ca    9.6        19 Jul 2019 13:32    TPro  8.3    /  Alb  3.8    /  TBili  0.7    /  DBili  x      /  AST  16     /  ALT  7      /  AlkPhos  152    19 Jul 2019 13:32    PT/INR - ( 19 Jul 2019 13:32 )   PT: 16.1 sec;   INR: 1.39 ratio         PTT - ( 19 Jul 2019 13:32 )  PTT:33.8 sec  CAPILLARY BLOOD GLUCOSE        LIVER FUNCTIONS - ( 19 Jul 2019 13:32 )  Alb: 3.8 g/dL / Pro: 8.3 g/dL / ALK PHOS: 152 U/L / ALT: 7 U/L / AST: 16 U/L / GGT: x               RADIOLOGY    1)     EXAM:  TIBIA FIBULA-LEFT                          PROCEDURE DATE:  07/19/2019      INTERPRETATION:  Radiographs of the LEFT tibia and fibula         CLINICAL INFORMATION: Soft tissue swelling. TECHNIQUE:  Frontal and   lateral views of the tibia and fibula were obtained.  Comparison: 5/1/2019  FINDINGS:  No prior studies are available for review.    The tibia and fibula appear intact.   No fracture is seen. Subcutaneous   cellulitis noted without subcutis air or radiopaque foreign body.    IMPRESSION :   No acute radiographic osseous pathology..      2)     EXAM:  US DPLX LWR EXT VEINS COMPL BI                          PROCEDURE DATE:  07/19/2019      INTERPRETATION:  CLINICAL INFORMATION: Bilateral lower extremity swelling   and painUltrasound of the lower extremity deep venous system         CLINICAL INFORMATION: , evaluate for deep venous thrombosis.    TECHNIQUE:   Duplex ultrasonography with color and spectral doppler of   the bilateral lower extremity deep venous system was performed.    FINDINGS:    No previous examinations are  available for review.    The bilateral lower extremity deep venous system demonstrated no   abnormality.  The veins were patent with intact Doppler flow.  The flow   varied with respiration and augmented with distal calf compression.  The   veins were directly compressible by the ultrasound transducer.       The veins evaluated included the common femoral vein, the inflow of the   greater saphenous vein, the inflow of the deep femoral vein, the   superficial femoral vein, the popliteal vein and posterior tibial veins.    RIGHT popliteal fossa Baker's cyst measures 4.5 x 1.6 x 2.7 cm.    IMPRESSION:   Unremarkable ultrasound of the bilateral lower extremity   deep venous system.              3)     EXAM:  XR CHEST PORTABLE IMMED 1V                          PROCEDURE DATE:  07/19/2019      INTERPRETATION:  AP chest on July 19, 2019 at 1:39 PM. Patient is short   of breath.    External device is noted at this time.    Gross heart enlargement again noted.    Scarring versus atelectasis in the left mid lateral lung field again   noted.    Atelectasis at right base seen on February 15 of this year shows   improvement.    There is a persistent slight interstitial congestive picture that also   shows improvement from prior.    IMPRESSION: Persistent mild interstitial CHF somewhat improved from   prior. Persistent atelectasis off left heart border. Heart enlargement.            EKG NSR  no acute ST-T changes                        Care plan discussed with (***)    Completed:

## 2019-07-19 NOTE — ED STATDOCS - PROGRESS NOTE DETAILS
dialysis pt here for leg pain. Was here about 3 months ago for similar episode, required IV vancomycin for treatment. In addition, she is due for dialysis. In addition to that, she was seen at [facility] for difficulty breathing. Currently wearing a life vest. Will send to [facility] for further eval.

## 2019-07-19 NOTE — ED PROVIDER NOTE - PROGRESS NOTE DETAILS
Sagar MO- Pt has no dvt, pt admitted for cellulitis of left leg. Nephrology Dr. Karrie bunnd fro need for dialysis

## 2019-07-19 NOTE — H&P ADULT - HISTORY OF PRESENT ILLNESS
Patient is a 59 year old female with PMH of ESRD on HD (MWF), Chronic HFrEF (EF 35-40% TTE 02/11/19, wears life vest), NICM (cardiac cath 03/18 non-obstructive CAD), paroxysmal Afib (no AC due to GI bleed), Uremic pericarditis s/p pericardiocentesis, Right sided pleural effusion s/p pigtail catheter, IDDM, HTN, Ascites s/p paracentesis on 02/2019, Cellulitis who presents to the ED with chief complain of left lower extremity pain and swelling. Patient was admitted with similar complaints and admitted with LLE cellulitis in May 2019. Patient is a 59 year old female with PMH of ESRD on HD (MWF), Chronic HFrEF (EF 35-40% TTE 02/11/19, wears life vest), NICM (cardiac cath 03/18 non-obstructive CAD), paroxysmal Afib (no AC due to GI bleed), Uremic pericarditis s/p pericardiocentesis, Right sided pleural effusion s/p pigtail catheter, IDDM, HTN, Ascites s/p paracentesis on 02/2019, Cellulitis who presents to the ED with chief complain of left lower extremity pain and swelling. Patient was admitted with similar complaints and admitted with LLE cellulitis in May 2019. Patient reports symptoms started 4 days ago and were associated with fever and chills. Reports the swelling and pain worsened and prompted her to report to the ED for further evaluation. In the ED, patient did not have leukocytosis, but LLE was edematous, tender and erythematous. Patient was cultured and started on IV vanco. Currently, patient denies dizziness, lightheadedness, chest pain, SOB, nausea, vomiting, abdominal pain, or diarrhea. LE duplex was negative for DVT.

## 2019-07-19 NOTE — ED PROVIDER NOTE - MUSCULOSKELETAL, MLM
Spine appears normal, range of motion is not limited, no muscle or joint tenderness, LEFT LEG SWELLING WITH REDNESS TO ANT SHIN, left forearm av fistula thrill +

## 2019-07-20 LAB
ANION GAP SERPL CALC-SCNC: 15 MMOL/L — SIGNIFICANT CHANGE UP (ref 5–17)
BUN SERPL-MCNC: 50 MG/DL — HIGH (ref 8–20)
CALCIUM SERPL-MCNC: 9.3 MG/DL — SIGNIFICANT CHANGE UP (ref 8.6–10.2)
CHLORIDE SERPL-SCNC: 91 MMOL/L — LOW (ref 98–107)
CO2 SERPL-SCNC: 24 MMOL/L — SIGNIFICANT CHANGE UP (ref 22–29)
CREAT SERPL-MCNC: 6.08 MG/DL — HIGH (ref 0.5–1.3)
FERRITIN SERPL-MCNC: 1026 NG/ML — HIGH (ref 15–150)
GLUCOSE BLDC GLUCOMTR-MCNC: 110 MG/DL — HIGH (ref 70–99)
GLUCOSE BLDC GLUCOMTR-MCNC: 140 MG/DL — HIGH (ref 70–99)
GLUCOSE BLDC GLUCOMTR-MCNC: 168 MG/DL — HIGH (ref 70–99)
GLUCOSE SERPL-MCNC: 158 MG/DL — HIGH (ref 70–115)
HBA1C BLD-MCNC: 6.8 % — HIGH (ref 4–5.6)
IRON SATN MFR SERPL: 18 % — SIGNIFICANT CHANGE UP (ref 14–50)
IRON SATN MFR SERPL: 32 UG/DL — LOW (ref 37–145)
MAGNESIUM SERPL-MCNC: 2 MG/DL — SIGNIFICANT CHANGE UP (ref 1.6–2.6)
PHOSPHATE SERPL-MCNC: 3.6 MG/DL — SIGNIFICANT CHANGE UP (ref 2.4–4.7)
POTASSIUM SERPL-MCNC: 5.8 MMOL/L — HIGH (ref 3.5–5.3)
POTASSIUM SERPL-SCNC: 5.8 MMOL/L — HIGH (ref 3.5–5.3)
SODIUM SERPL-SCNC: 130 MMOL/L — LOW (ref 135–145)
TIBC SERPL-MCNC: 179 UG/DL — LOW (ref 220–430)
TRANSFERRIN SERPL-MCNC: 125 MG/DL — LOW (ref 192–382)
VANCOMYCIN FLD-MCNC: 12.4 UG/ML — SIGNIFICANT CHANGE UP

## 2019-07-20 PROCEDURE — 99232 SBSQ HOSP IP/OBS MODERATE 35: CPT

## 2019-07-20 PROCEDURE — 99222 1ST HOSP IP/OBS MODERATE 55: CPT

## 2019-07-20 RX ORDER — CEPHALEXIN 500 MG
500 CAPSULE ORAL EVERY 12 HOURS
Refills: 0 | Status: DISCONTINUED | OUTPATIENT
Start: 2019-07-20 | End: 2019-07-21

## 2019-07-20 RX ORDER — ONDANSETRON 8 MG/1
4 TABLET, FILM COATED ORAL ONCE
Refills: 0 | Status: COMPLETED | OUTPATIENT
Start: 2019-07-20 | End: 2019-07-20

## 2019-07-20 RX ADMIN — Medication 100 MILLIGRAM(S): at 05:52

## 2019-07-20 RX ADMIN — Medication 2 UNIT(S): at 11:52

## 2019-07-20 RX ADMIN — Medication 100 MILLIGRAM(S): at 18:08

## 2019-07-20 RX ADMIN — Medication 300 MILLIGRAM(S): at 21:39

## 2019-07-20 RX ADMIN — CARVEDILOL PHOSPHATE 12.5 MILLIGRAM(S): 80 CAPSULE, EXTENDED RELEASE ORAL at 18:07

## 2019-07-20 RX ADMIN — OXYCODONE AND ACETAMINOPHEN 2 TABLET(S): 5; 325 TABLET ORAL at 03:24

## 2019-07-20 RX ADMIN — SACUBITRIL AND VALSARTAN 1 TABLET(S): 24; 26 TABLET, FILM COATED ORAL at 05:52

## 2019-07-20 RX ADMIN — HEPARIN SODIUM 5000 UNIT(S): 5000 INJECTION INTRAVENOUS; SUBCUTANEOUS at 18:09

## 2019-07-20 RX ADMIN — HEPARIN SODIUM 5000 UNIT(S): 5000 INJECTION INTRAVENOUS; SUBCUTANEOUS at 21:42

## 2019-07-20 RX ADMIN — CARVEDILOL PHOSPHATE 12.5 MILLIGRAM(S): 80 CAPSULE, EXTENDED RELEASE ORAL at 05:52

## 2019-07-20 RX ADMIN — OXYCODONE AND ACETAMINOPHEN 2 TABLET(S): 5; 325 TABLET ORAL at 22:39

## 2019-07-20 RX ADMIN — INSULIN GLARGINE 4 UNIT(S): 100 INJECTION, SOLUTION SUBCUTANEOUS at 21:39

## 2019-07-20 RX ADMIN — Medication 500 MILLIGRAM(S): at 11:52

## 2019-07-20 RX ADMIN — ONDANSETRON 4 MILLIGRAM(S): 8 TABLET, FILM COATED ORAL at 04:00

## 2019-07-20 RX ADMIN — OXYCODONE AND ACETAMINOPHEN 2 TABLET(S): 5; 325 TABLET ORAL at 02:10

## 2019-07-20 RX ADMIN — Medication 500 MILLIGRAM(S): at 18:07

## 2019-07-20 RX ADMIN — PANTOPRAZOLE SODIUM 40 MILLIGRAM(S): 20 TABLET, DELAYED RELEASE ORAL at 06:05

## 2019-07-20 RX ADMIN — Medication 300 MILLIGRAM(S): at 18:07

## 2019-07-20 RX ADMIN — OXYCODONE AND ACETAMINOPHEN 2 TABLET(S): 5; 325 TABLET ORAL at 21:39

## 2019-07-20 RX ADMIN — Medication 2 UNIT(S): at 16:56

## 2019-07-20 RX ADMIN — AMLODIPINE BESYLATE 2.5 MILLIGRAM(S): 2.5 TABLET ORAL at 05:52

## 2019-07-20 RX ADMIN — Medication 325 MILLIGRAM(S): at 11:52

## 2019-07-20 RX ADMIN — HEPARIN SODIUM 5000 UNIT(S): 5000 INJECTION INTRAVENOUS; SUBCUTANEOUS at 05:52

## 2019-07-20 RX ADMIN — Medication 81 MILLIGRAM(S): at 11:52

## 2019-07-20 RX ADMIN — CALCITRIOL 0.25 MICROGRAM(S): 0.5 CAPSULE ORAL at 11:52

## 2019-07-20 NOTE — CONSULT NOTE ADULT - SUBJECTIVE AND OBJECTIVE BOX
HPI:  Patient is a 59 year old female with PMH of ESRD on HD (MWF), Chronic HFrEF (EF 35-40% TTE 02/11/19, wears life vest), NICM (cardiac cath 03/18 non-obstructive CAD), paroxysmal Afib (no AC due to GI bleed), Uremic pericarditis s/p pericardiocentesis, Right sided pleural effusion s/p pigtail catheter, IDDM, HTN, Ascites s/p paracentesis on 02/2019, Cellulitis who presents to the ED with chief complain of left lower extremity pain and swelling. Patient was admitted with similar complaints and admitted with LLE cellulitis in May 2019. Patient reports symptoms started 4 days ago and were associated with fever and chills. Reports the swelling and pain worsened and prompted her to report to the ED for further evaluation. In the ED, patient did not have leukocytosis, but LLE was edematous, tender and erythematous. Patient was cultured and started on IV vanco. Currently, patient denies dizziness, lightheadedness, chest pain, SOB, nausea,   ROS above    PAST MEDICAL & SURGICAL HISTORY:  Coronary artery disease, angina presence unspecified, unspecified vessel or lesion type, unspecified whether native or transplanted heart  Paroxysmal atrial fibrillation  Anemia due to chronic kidney disease, unspecified CKD stage  Chronic systolic congestive heart failure  Pleural effusion  Pericardial effusion  End stage renal disease on dialysis  HLD (hyperlipidemia)  HTN (hypertension)  DM (diabetes mellitus)  A-V fistula  Encounter for dialysis catheter care      FAMILY HISTORY:  Family history of kidney disease in brother (Sibling)  [place]    Social History:Non smoker    MEDICATIONS  (STANDING):  amLODIPine   Tablet 2.5 milliGRAM(s) Oral daily  ascorbic acid 500 milliGRAM(s) Oral daily  aspirin enteric coated 81 milliGRAM(s) Oral daily  calcitriol   Capsule 0.25 MICROGram(s) Oral daily  carvedilol 12.5 milliGRAM(s) Oral every 12 hours  cefTRIAXone   IVPB 1000 milliGRAM(s) IV Intermittent every 24 hours  dextrose 5%. 1000 milliLiter(s) (50 mL/Hr) IV Continuous <Continuous>  dextrose 50% Injectable 12.5 Gram(s) IV Push once  dextrose 50% Injectable 25 Gram(s) IV Push once  dextrose 50% Injectable 25 Gram(s) IV Push once  docusate sodium 100 milliGRAM(s) Oral two times a day  ferrous    sulfate 325 milliGRAM(s) Oral daily  heparin  Injectable 5000 Unit(s) SubCutaneous every 8 hours  insulin glargine Injectable (LANTUS) 4 Unit(s) SubCutaneous at bedtime  insulin lispro Injectable (HumaLOG) 2 Unit(s) SubCutaneous before lunch  insulin lispro Injectable (HumaLOG) 2 Unit(s) SubCutaneous before dinner  pantoprazole    Tablet 40 milliGRAM(s) Oral before breakfast  sacubitril 49 mG/valsartan 51 mG 1 Tablet(s) Oral daily    MEDICATIONS  (PRN):  acetaminophen   Tablet .. 650 milliGRAM(s) Oral every 6 hours PRN Temp greater or equal to 38C (100.4F), Mild Pain (1 - 3)  dextrose 40% Gel 15 Gram(s) Oral once PRN Blood Glucose LESS THAN 70 milliGRAM(s)/deciliter  glucagon  Injectable 1 milliGRAM(s) IntraMuscular once PRN Glucose LESS THAN 70 milligrams/deciliter  oxyCODONE    5 mG/acetaminophen 325 mG 1 Tablet(s) Oral every 6 hours PRN Moderate Pain (4 - 6)  oxyCODONE    5 mG/acetaminophen 325 mG 2 Tablet(s) Oral every 6 hours PRN Severe Pain (7 - 10)  senna 2 Tablet(s) Oral at bedtime PRN Constipation   Meds reviewed        Vital Signs Last 24 Hrs  T(C): 36.6 (20 Jul 2019 08:12), Max: 37.4 (19 Jul 2019 18:08)  T(F): 97.8 (20 Jul 2019 08:12), Max: 99.3 (19 Jul 2019 18:08)  HR: 86 (20 Jul 2019 08:12) (76 - 86)  BP: 116/66 (20 Jul 2019 08:12) (116/66 - 165/90)  BP(mean): --  RR: 18 (20 Jul 2019 08:12) (16 - 18)  SpO2: 87% (20 Jul 2019 09:17) (87% - 98%)  Daily     Daily     PHYSICAL EXAM:    GENERAL: appears chronically ill  HEAD:  Atraumatic, Normocephalic  EYES: EOMI  NECK: Supple, neck  veins full  NERVOUS SYSTEM:  Alert & Oriented X3  CHEST/LUNG: Clear to percussion bilaterally; No rales  HEART: Regular rate and rhythm; No murmurs  ABDOMEN: Soft, Nontender, Nondistended; Bowel sounds present  EXTREMITIES:  L LE cellulitis        LABS:                        10.2   6.56  )-----------( 250      ( 19 Jul 2019 13:32 )             33.5     07-19    133<L>  |  92<L>  |  39.0<H>  ----------------------------<  240<H>  4.8   |  25.0  |  5.31<H>    Ca    9.6      19 Jul 2019 13:32    TPro  8.3  /  Alb  3.8  /  TBili  0.7  /  DBili  x   /  AST  16  /  ALT  7   /  AlkPhos  152<H>  07-19    PT/INR - ( 19 Jul 2019 13:32 )   PT: 16.1 sec;   INR: 1.39 ratio         PTT - ( 19 Jul 2019 13:32 )  PTT:33.8 sec            RADIOLOGY & ADDITIONAL TESTS:

## 2019-07-20 NOTE — PROGRESS NOTE ADULT - ASSESSMENT
Patient is a 59 year old female with PMH of ESRD on HD (MWF), Chronic HFrEF (EF 35-40% TTE 02/11/19, wears life vest), NICM (cardiac cath 03/18 non-obstructive CAD), paroxysmal Afib (no AC due to GI bleed), Uremic pericarditis s/p pericardiocentesis, Right sided pleural effusion s/p pigtail catheter, IDDM, HTN, Ascites s/p paracentesis on 02/2019, Cellulitis, who presents to the ED with chief complain of left lower extremity pain and swelling and admitted for LLE cellulitis.      >LLE Cellulitis  - Clinically better.   - on Rociphen- ? switch to Ancef- ID to see pt today- will defer to ID.    - Tylenol prn.   - F/u blood cx.   - ID consult requested    2. ESRD on HD (MWF)  -Missed HD yesterday, Renal consult noted- HD today.   -strict I/Os, daily weights  -avoid nephrotoxic agents and renally dose all medications for eGFR < 10  -renal consult noted.   -C/w o/p meds    3. Elevated TNI/ ProBNP  -asymptomatic  -likely due to ESRD; TNI was at the same level in Dec 2018  -EKG without acute ischemic changes    4. Chronic HFpEF secondary to NICM  -BNP 70,000  -CXR with congestive changes  -Ultrafiltration per renal  -Continue coreg and entresto  -TTE - EF 35-40% in Feb 2019  -Cardiac cath 03/18 non-obstructive CAD)  -strict I/Os, daily weights  -patient wearing life vest  -outpatient follow up with cardiology    5. History of Atrial Fibrillation   -continue bb  -not a candidate for AC due to GIB    6. DM  -pedning HbA1c  -Lantus and ISS  -ADA diet    7. HTN  -BP stable  -resume home medications, continue Norvasc coreg and entresto  -low sodium diet    8. History of GIB  -continue PPI    DVT ppx - Heparin SC Patient is a 59 year old female with PMH of ESRD on HD (MWF), Chronic HFrEF (EF 35-40% TTE 02/11/19, wears life vest), NICM (cardiac cath 03/18 non-obstructive CAD), paroxysmal Afib (no AC due to GI bleed), Uremic pericarditis s/p pericardiocentesis, Right sided pleural effusion s/p pigtail catheter, IDDM, HTN, Ascites s/p paracentesis on 02/2019, Cellulitis, who presents to the ED with chief complain of left lower extremity pain and swelling and admitted for LLE cellulitis.      >LLE Cellulitis  - Clinically better.   - on Rociphen- ? switch to Ancef- ID to see pt today- will defer to ID.    - Tylenol prn.   - F/u blood cx.   - ID consult requested    2. ESRD on HD (MWF)  -Missed HD yesterday, Renal consult noted- HD today.   -strict I/Os, daily weights  -avoid nephrotoxic agents and renally dose all medications for eGFR < 10  -renal consult noted.   -C/w o/p meds    3. Elevated TNI/ ProBNP  -asymptomatic  -likely due to ESRD; TNI was at the same level in Dec 2018  -EKG without acute ischemic changes    4. Chronic HFrEF secondary to NICM  -BNP 70,000  -CXR with congestive changes  -Ultrafiltration per renal  -Continue coreg and entresto  -TTE - EF 35-40% in Feb 2019  -Cardiac cath 03/18 non-obstructive CAD)  -strict I/Os, daily weights  -patient wearing life vest  -outpatient follow up with cardiology    5. History of Atrial Fibrillation   -continue bb  -not a candidate for AC due to GIB    6. DM  -pedning HbA1c  -Lantus and ISS  -ADA diet    7. HTN  -BP stable  -resume home medications, continue Norvasc coreg and entresto  -low sodium diet    8. History of GIB  -continue PPI    DVT ppx - Heparin SC

## 2019-07-20 NOTE — CONSULT NOTE ADULT - ASSESSMENT
ESRD on HD MWF schedule missed yest will HD today  and again mon if still in hosp to keep on schedule  Electrolytes BP ok  Anemia no MARGI indicated  Conset obtained by me is in chart    cellulitis LLE on abx    Will follow
This 59 y.o. F with ESRD on HD (MWF), Chronic HFrEF (EF 35-40% TTE 02/11/19, wears life vest), NICM (cardiac cath 03/18 non-obstructive CAD), paroxysmal Afib (no AC due to GI bleed), Uremic pericarditis s/p pericardiocentesis, Right sided pleural effusion s/p pigtail catheter, IDDM, HTN, Ascites s/p paracentesis on 02/2019,  admitted on 7/19/19 for left lower extremity pain and swelling.   Prior episode of LLE cellulitis in May 2019.     LLE cellulitis  ESRD on HD  no hx of MRSA    - will de-escalate antibiotics to:  Keflex 500mg PO BID x 7 days  Clindamycin 300mg PO TID x 3 days.      no contraindications to discharge to community from ID standpoint
59 yr old female w Chronic HFrEF,  DM, ESRD on HD M-W-F, HLD, HTN, PAF (no AC due to GIB) wearing life vest, came to ED c/o LLE swelling and fever       #Cellulitis LLE  no evidence for gas or FB or DVT  1. vanco  2. monitor temp  3. continue vanco 1 g q 24  4. random vanc level  5. add ceftriaxone 1 g q 24  6. ID consulted      #HFrEF 35-40% w life vest  NICM  elevated BNP  PAF not on AC //current EKG SR  CXR shows improvement in CHF over prior  1. continue coreg 12.5 mg BID w parameters  2. entresto  3. HD  4. renal consulted by ED    #DM  1. lantus 4 units q hs  2. 2 units rapid acting before lunch and dinner      #ESRD  1. calcitriol  2. HD      Prophylaxis  GI pantoprazole        IMPROVE VTE Individual Risk Assessment    RISK                                                                Points    [  ] Previous VTE                                                  3    [  ] Thrombophilia                                               2    [  ] Lower limb paralysis                                      2        (unable to hold up >15 seconds)      [  ] Current Cancer                                              2         (within 6 months)    [  ] Immobilization > 24 hrs                                1    [  ] ICU/CCU stay > 24 hours                              1    [  ] Age > 60                                                      1    IMPROVE VTE Score __0_______    IMPROVE Score 0-1: Low Risk, No VTE prophylaxis required for most patients, encourage ambulation.   IMPROVE Score 2-3: At risk, pharmacologic VTE prophylaxis is indicated for most patients (in the absence of a contraindication)  IMPROVE Score > or = 4: High Risk, pharmacologic VTE prophylaxis is indicated for most patients (in the absence of a contraindication)

## 2019-07-20 NOTE — PROGRESS NOTE ADULT - SUBJECTIVE AND OBJECTIVE BOX
JAROCHO MORALES Female 59y [id number]    Patient is a 59y old  Female who presents with a chief complaint of leg pain/swelling (19 Jul 2019 17:21)      On Service note:  Pt seen/ examined at bedside and charts reviewed, interviewed with , no over night event reported by night staff. Pt reports she came in to hospital for LLE pain and swelling which are improving now, she has no other complaints, denied any chest pain or SOB. She does reports being on Home O2.     Review of system:  No fever, chills, nausea, vomiting, headache, dizziness, chest pain or palpitation.      PHYSICAL EXAM:    Vital Signs Last 24 Hrs  T(C): 36.6 (20 Jul 2019 08:12), Max: 37.4 (19 Jul 2019 18:08)  T(F): 97.8 (20 Jul 2019 08:12), Max: 99.3 (19 Jul 2019 18:08)  HR: 86 (20 Jul 2019 08:12) (76 - 86)  BP: 116/66 (20 Jul 2019 08:12) (116/66 - 165/90)  BP(mean): --  RR: 18 (20 Jul 2019 08:12) (16 - 18)  SpO2: 87% (20 Jul 2019 09:17) (87% - 98%)    GENERAL: Pt lying comfortably, NAD.  CHEST/LUNG: Clear to auscultation bilaterally; No wheezing.  HEART: S1S2+, Regular rate and rhythm; No murmurs.  ABDOMEN: Soft, Nontender, Nondistended; Bowel sounds present.  Extremities: LLE erythema, warm+  NEURO: AAOX3, no focal deficits, no motor r sensory loss.  PSYCH: normal mood.          MEDICATIONS  (STANDING):  amLODIPine   Tablet 2.5 milliGRAM(s) Oral daily  ascorbic acid 500 milliGRAM(s) Oral daily  aspirin enteric coated 81 milliGRAM(s) Oral daily  calcitriol   Capsule 0.25 MICROGram(s) Oral daily  carvedilol 12.5 milliGRAM(s) Oral every 12 hours  cefTRIAXone   IVPB 1000 milliGRAM(s) IV Intermittent every 24 hours  dextrose 5%. 1000 milliLiter(s) (50 mL/Hr) IV Continuous <Continuous>  dextrose 50% Injectable 12.5 Gram(s) IV Push once  dextrose 50% Injectable 25 Gram(s) IV Push once  dextrose 50% Injectable 25 Gram(s) IV Push once  docusate sodium 100 milliGRAM(s) Oral two times a day  ferrous    sulfate 325 milliGRAM(s) Oral daily  heparin  Injectable 5000 Unit(s) SubCutaneous every 8 hours  insulin glargine Injectable (LANTUS) 4 Unit(s) SubCutaneous at bedtime  insulin lispro Injectable (HumaLOG) 2 Unit(s) SubCutaneous before lunch  insulin lispro Injectable (HumaLOG) 2 Unit(s) SubCutaneous before dinner  pantoprazole    Tablet 40 milliGRAM(s) Oral before breakfast  sacubitril 49 mG/valsartan 51 mG 1 Tablet(s) Oral daily    MEDICATIONS  (PRN):  acetaminophen   Tablet .. 650 milliGRAM(s) Oral every 6 hours PRN Temp greater or equal to 38C (100.4F), Mild Pain (1 - 3)  dextrose 40% Gel 15 Gram(s) Oral once PRN Blood Glucose LESS THAN 70 milliGRAM(s)/deciliter  glucagon  Injectable 1 milliGRAM(s) IntraMuscular once PRN Glucose LESS THAN 70 milligrams/deciliter  oxyCODONE    5 mG/acetaminophen 325 mG 1 Tablet(s) Oral every 6 hours PRN Moderate Pain (4 - 6)  oxyCODONE    5 mG/acetaminophen 325 mG 2 Tablet(s) Oral every 6 hours PRN Severe Pain (7 - 10)  senna 2 Tablet(s) Oral at bedtime PRN Constipation        Labs:  LABS:                        10.2   6.56  )-----------( 250      ( 19 Jul 2019 13:32 )             33.5     07-19    133<L>  |  92<L>  |  39.0<H>  ----------------------------<  240<H>  4.8   |  25.0  |  5.31<H>    Ca    9.6      19 Jul 2019 13:32    TPro  8.3  /  Alb  3.8  /  TBili  0.7  /  DBili  x   /  AST  16  /  ALT  7   /  AlkPhos  152<H>  07-19    PT/INR - ( 19 Jul 2019 13:32 )   PT: 16.1 sec;   INR: 1.39 ratio         PTT - ( 19 Jul 2019 13:32 )  PTT:33.8 sec    LIVER FUNCTIONS - ( 19 Jul 2019 13:32 )  Alb: 3.8 g/dL / Pro: 8.3 g/dL / ALK PHOS: 152 U/L / ALT: 7 U/L / AST: 16 U/L / GGT: x               CARDIAC MARKERS ( 19 Jul 2019 13:32 )  x     / 0.58 ng/mL / x     / x     / x

## 2019-07-20 NOTE — CONSULT NOTE ADULT - SUBJECTIVE AND OBJECTIVE BOX
[facility]  INFECTIOUS DISEASES AND INTERNAL MEDICINE at [place]  =======================================================  Jose Carlos Hoskins MD [facility] HillSTEPHANIE Villanueva MD  Diplomates American Board of Internal Medicine and Infectious Diseases  Telephone [phone]  Fax            [phone]  =======================================================    MRN-[id number]  JAROCHO MORALES   This 59 y.o. F with ESRD on HD (MWF), Chronic HFrEF (EF 35-40% TTE 02/11/19, wears life vest), NICM (cardiac cath 03/18 non-obstructive CAD), paroxysmal Afib (no AC due to GI bleed), Uremic pericarditis s/p pericardiocentesis, Right sided pleural effusion s/p pigtail catheter, IDDM, HTN, Ascites s/p paracentesis on 02/2019,  admitted on 7/19/19 for left lower extremity pain and swelling.     Prior episode of LLE cellulitis in May 2019.     Patient reports symptoms started 4 days ago and were associated with fever and chills.   Reports the swelling and pain worsened and prompted her to report to the ED for further evaluation.   labs show normal WBC.  Low grade temp 99.3 noted in ER. Exam by admitting team showed  LLE was edematous, tender and erythematous.     Denies trauma.  Patient was given in the ER:  cefTRIAXone   IVPB   100 mL/Hr (07-19-19 @ 21:02)  vancomycin  IVPB   250 mL/Hr (07-19-19 @ 13:36)    Blood cultures in the past had been negative.  NO history of MRSA.     =======================================================  Past Medical & Surgical Hx:  =====================  PAST MEDICAL & SURGICAL HISTORY:  Coronary artery disease, angina presence unspecified, unspecified vessel or lesion type, unspecified whether native or transplanted heart  Paroxysmal atrial fibrillation  Anemia due to chronic kidney disease, unspecified CKD stage  Chronic systolic congestive heart failure  Pleural effusion  Pericardial effusion  End stage renal disease on dialysis  HLD (hyperlipidemia)  HTN (hypertension)  DM (diabetes mellitus)  A-V fistula  Encounter for dialysis catheter care    Problem List:  ==========  HEALTH ISSUES - PROBLEM Dx:      Social Hx:  =======  no toxic habits currently    FAMILY HISTORY:  Family history of kidney disease in brother (Sibling)  no significant family history of immunosuppressive disorders in mother or father    =======================================================  REVIEW OF SYSTEMS:  as above  all other ROS negative  =======================================================  Allergies  No Known Allergies  Intolerances    Antibiotics:  cefTRIAXone   IVPB 1000 milliGRAM(s) IV Intermittent every 24 hours    Other medications:  amLODIPine   Tablet 2.5 milliGRAM(s) Oral daily  ascorbic acid 500 milliGRAM(s) Oral daily  aspirin enteric coated 81 milliGRAM(s) Oral daily  calcitriol   Capsule 0.25 MICROGram(s) Oral daily  carvedilol 12.5 milliGRAM(s) Oral every 12 hours  dextrose 5%. 1000 milliLiter(s) IV Continuous <Continuous>  dextrose 50% Injectable 12.5 Gram(s) IV Push once  dextrose 50% Injectable 25 Gram(s) IV Push once  dextrose 50% Injectable 25 Gram(s) IV Push once  docusate sodium 100 milliGRAM(s) Oral two times a day  ferrous    sulfate 325 milliGRAM(s) Oral daily  heparin  Injectable 5000 Unit(s) SubCutaneous every 8 hours  insulin glargine Injectable (LANTUS) 4 Unit(s) SubCutaneous at bedtime  insulin lispro Injectable (HumaLOG) 2 Unit(s) SubCutaneous before lunch  insulin lispro Injectable (HumaLOG) 2 Unit(s) SubCutaneous before dinner  pantoprazole    Tablet 40 milliGRAM(s) Oral before breakfast  sacubitril 49 mG/valsartan 51 mG 1 Tablet(s) Oral daily    ======================================================  Physical Exam:  ============  T(F): 97.8 (20 Jul 2019 08:12), Max: 99.3 (19 Jul 2019 18:08)  HR: 86 (20 Jul 2019 08:12)  BP: 116/66 (20 Jul 2019 08:12)  RR: 18 (20 Jul 2019 08:12)  SpO2: 87% (20 Jul 2019 09:17) (87% - 98%)  temp max in last 48H T(F): , Max: 99.3 (07-19-19 @ 11:15)    General:  No acute distress.  Pleasant  Eye: Pupils are equal, round and reactive to light, Extraocular movements are intact, Normal conjunctiva.  HENT: Normocephalic, Oral mucosa is moist, No pharyngeal erythema, No sinus tenderness.  Neck: Supple, No lymphadenopathy.  Respiratory: Lungs with good air intake  Cardiovascular: Normal rate, Regular rhythm,   LEFT arm AVF  Gastrointestinal: Soft, Non-tender, Non-distended, Normal bowel sounds.  Genitourinary: No costovertebral angle tenderness.  Lymphatics: No lymphadenopathy neck,   Musculoskeletal: Normal range of motion, Normal strength.  Integumentary: LEFT anterior leg with streaking of erythema, tender to touch.    NO EDEMA  Neurologic: Alert, Oriented, No focal deficits, Cranial Nerves II-XII are grossly intact.  Psychiatric: Appropriate mood & affect.    =======================================================  Labs:                        10.2   6.56  )-----------( 250      ( 19 Jul 2019 13:32 )             33.5       WBC Count: 6.56 K/uL (07-19-19 @ 13:32)      07-19    133<L>  |  92<L>  |  39.0<H>  ----------------------------<  240<H>  4.8   |  25.0  |  5.31<H>    Ca    9.6      19 Jul 2019 13:32    TPro  8.3  /  Alb  3.8  /  TBili  0.7  /  DBili  x   /  AST  16  /  ALT  7   /  AlkPhos  152<H>  07-19      Creatinine, Serum: 5.31 mg/dL (07-19-19 @ 13:32)

## 2019-07-21 ENCOUNTER — TRANSCRIPTION ENCOUNTER (OUTPATIENT)
Age: 59
End: 2019-07-21

## 2019-07-21 VITALS
SYSTOLIC BLOOD PRESSURE: 138 MMHG | TEMPERATURE: 98 F | RESPIRATION RATE: 18 BRPM | DIASTOLIC BLOOD PRESSURE: 72 MMHG | OXYGEN SATURATION: 91 % | HEART RATE: 75 BPM

## 2019-07-21 LAB
ANION GAP SERPL CALC-SCNC: 16 MMOL/L — SIGNIFICANT CHANGE UP (ref 5–17)
BUN SERPL-MCNC: 29 MG/DL — HIGH (ref 8–20)
CALCIUM SERPL-MCNC: 9.3 MG/DL — SIGNIFICANT CHANGE UP (ref 8.6–10.2)
CHLORIDE SERPL-SCNC: 93 MMOL/L — LOW (ref 98–107)
CO2 SERPL-SCNC: 28 MMOL/L — SIGNIFICANT CHANGE UP (ref 22–29)
CREAT SERPL-MCNC: 4.39 MG/DL — HIGH (ref 0.5–1.3)
GLUCOSE BLDC GLUCOMTR-MCNC: 112 MG/DL — HIGH (ref 70–99)
GLUCOSE BLDC GLUCOMTR-MCNC: 170 MG/DL — HIGH (ref 70–99)
GLUCOSE BLDC GLUCOMTR-MCNC: 201 MG/DL — HIGH (ref 70–99)
GLUCOSE SERPL-MCNC: 101 MG/DL — SIGNIFICANT CHANGE UP (ref 70–115)
HCT VFR BLD CALC: 32.3 % — LOW (ref 34.5–45)
HGB BLD-MCNC: 9.6 G/DL — LOW (ref 11.5–15.5)
MCHC RBC-ENTMCNC: 26.8 PG — LOW (ref 27–34)
MCHC RBC-ENTMCNC: 29.7 GM/DL — LOW (ref 32–36)
MCV RBC AUTO: 90.2 FL — SIGNIFICANT CHANGE UP (ref 80–100)
PLATELET # BLD AUTO: 264 K/UL — SIGNIFICANT CHANGE UP (ref 150–400)
POTASSIUM SERPL-MCNC: 4.3 MMOL/L — SIGNIFICANT CHANGE UP (ref 3.5–5.3)
POTASSIUM SERPL-SCNC: 4.3 MMOL/L — SIGNIFICANT CHANGE UP (ref 3.5–5.3)
RBC # BLD: 3.58 M/UL — LOW (ref 3.8–5.2)
RBC # FLD: 15.6 % — HIGH (ref 10.3–14.5)
SODIUM SERPL-SCNC: 137 MMOL/L — SIGNIFICANT CHANGE UP (ref 135–145)
WBC # BLD: 4.36 K/UL — SIGNIFICANT CHANGE UP (ref 3.8–10.5)
WBC # FLD AUTO: 4.36 K/UL — SIGNIFICANT CHANGE UP (ref 3.8–10.5)

## 2019-07-21 PROCEDURE — 99239 HOSP IP/OBS DSCHRG MGMT >30: CPT

## 2019-07-21 RX ORDER — ACETAMINOPHEN 500 MG
2 TABLET ORAL
Qty: 0 | Refills: 0 | DISCHARGE
Start: 2019-07-21

## 2019-07-21 RX ORDER — CEPHALEXIN 500 MG
1 CAPSULE ORAL
Qty: 12 | Refills: 0
Start: 2019-07-21 | End: 2019-07-26

## 2019-07-21 RX ADMIN — Medication 300 MILLIGRAM(S): at 05:24

## 2019-07-21 RX ADMIN — SACUBITRIL AND VALSARTAN 1 TABLET(S): 24; 26 TABLET, FILM COATED ORAL at 05:24

## 2019-07-21 RX ADMIN — Medication 2 UNIT(S): at 16:44

## 2019-07-21 RX ADMIN — Medication 325 MILLIGRAM(S): at 12:12

## 2019-07-21 RX ADMIN — Medication 2 UNIT(S): at 12:12

## 2019-07-21 RX ADMIN — CALCITRIOL 0.25 MICROGRAM(S): 0.5 CAPSULE ORAL at 12:12

## 2019-07-21 RX ADMIN — Medication 100 MILLIGRAM(S): at 05:24

## 2019-07-21 RX ADMIN — OXYCODONE AND ACETAMINOPHEN 2 TABLET(S): 5; 325 TABLET ORAL at 05:23

## 2019-07-21 RX ADMIN — HEPARIN SODIUM 5000 UNIT(S): 5000 INJECTION INTRAVENOUS; SUBCUTANEOUS at 05:23

## 2019-07-21 RX ADMIN — Medication 300 MILLIGRAM(S): at 14:53

## 2019-07-21 RX ADMIN — Medication 81 MILLIGRAM(S): at 12:12

## 2019-07-21 RX ADMIN — Medication 500 MILLIGRAM(S): at 12:12

## 2019-07-21 RX ADMIN — PANTOPRAZOLE SODIUM 40 MILLIGRAM(S): 20 TABLET, DELAYED RELEASE ORAL at 05:24

## 2019-07-21 RX ADMIN — CARVEDILOL PHOSPHATE 12.5 MILLIGRAM(S): 80 CAPSULE, EXTENDED RELEASE ORAL at 05:24

## 2019-07-21 RX ADMIN — AMLODIPINE BESYLATE 2.5 MILLIGRAM(S): 2.5 TABLET ORAL at 05:24

## 2019-07-21 RX ADMIN — Medication 500 MILLIGRAM(S): at 05:24

## 2019-07-21 RX ADMIN — HEPARIN SODIUM 5000 UNIT(S): 5000 INJECTION INTRAVENOUS; SUBCUTANEOUS at 13:58

## 2019-07-21 NOTE — DISCHARGE NOTE PROVIDER - HOSPITAL COURSE
Patient is a 59 year old female with PMH of ESRD on HD (MWF), Chronic HFrEF (EF 35-40% TTE 02/11/19, wears life vest), NICM (cardiac cath 03/18 non-obstructive CAD), paroxysmal Afib (no AC due to GI bleed), Uremic pericarditis s/p pericardiocentesis, Right sided pleural effusion s/p pigtail catheter, IDDM, HTN, Ascites s/p paracentesis on 02/2019, Cellulitis, who presents to the ED with chief complain of left lower extremity pain and swelling and admitted for LLE cellulitis. Kept on IV antibiotics and other home medicine resumed. Seen in consultation with ID, Abx switched to PO and Pt deemed stable from ID. Pt clinically improved, afebrile, wbc normal, no growth on cx so far. Pt stable for discharge.         PHYSICAL EXAM:        Vital Signs Last 24 Hrs    T(C): 36.4 (20 Jul 2019 23:56), Max: 36.9 (20 Jul 2019 14:12)    T(F): 97.5 (20 Jul 2019 23:56), Max: 98.5 (20 Jul 2019 14:12)    HR: 74 (20 Jul 2019 23:56) (71 - 78)    BP: 116/65 (20 Jul 2019 23:56) (114/64 - 143/73)    BP(mean): --    RR: 18 (20 Jul 2019 23:56) (18 - 18)    SpO2: 98% (20 Jul 2019 23:56) (94% - 98%)        GENERAL: Pt lying comfortably, NAD.    CHEST/LUNG: Clear to auscultation bilaterally; No wheezing.    HEART: S1S2+, Regular rate and rhythm; No murmurs.    ABDOMEN: Soft, Nontender, Nondistended; Bowel sounds present.    Extremities: LLE erythema, warm+    NEURO: AAOX3, no focal deficits, no motor r sensory loss.    PSYCH: normal mood.        Total time spent on discharge 40 minutes.

## 2019-07-21 NOTE — DISCHARGE NOTE NURSING/CASE MANAGEMENT/SOCIAL WORK - NSDCDPATPORTLINK_GEN_ALL_CORE
You can access the [facility] Portal, offered by [facility], by registering with the following website: [email or web address]

## 2019-07-21 NOTE — PROGRESS NOTE ADULT - ASSESSMENT
ESRD on HD MWF schedule   HD tomorrow  Electrolytes BP ok  Anemia will check iron studies  Conset obtained by me is in chart    cellulitis LLE on abx    Will follow

## 2019-07-21 NOTE — DISCHARGE NOTE PROVIDER - NSDCCPCAREPLAN_GEN_ALL_CORE_FT
PRINCIPAL DISCHARGE DIAGNOSIS  Diagnosis: Cellulitis of left lower extremity  Assessment and Plan of Treatment: Take antibiotics as reocnicled to complete the course.   Follow up with PMD in 1 week.      SECONDARY DISCHARGE DIAGNOSES  Diagnosis: HTN (hypertension)  Assessment and Plan of Treatment: C/w home meds  F/u with PMD    Diagnosis: Diabetes mellitus  Assessment and Plan of Treatment: C/w home meds  F/u with PMD    Diagnosis: Chronic HFrEF (heart failure with reduced ejection fraction)  Assessment and Plan of Treatment: C/w home meds  F/u with your cardiologist in 1 week    Diagnosis: ESRD on dialysis  Assessment and Plan of Treatment: C/w HD as per schedule. next due tomorrow.   F/u with nephrology.    Diagnosis: PAF (paroxysmal atrial fibrillation)  Assessment and Plan of Treatment: C/w home meds

## 2019-07-21 NOTE — DISCHARGE NOTE PROVIDER - CARE PROVIDER_API CALL
Ian Yoon)  Nephrology  [address] [place] [address]  Phone: [phone]  Fax: [phone]  Follow Up Time:     PMD,   Phone: (   )    -  Fax: (   )    -  Follow Up Time:     Cardiology.,   Phone: (   )    -  Fax: (   )    -  Follow Up Time:

## 2019-07-21 NOTE — PROGRESS NOTE ADULT - SUBJECTIVE AND OBJECTIVE BOX
NEPHROLOGY INTERVAL HPI/OVERNIGHT EVENTS:    Examined   Feeling better  Pain L LE    MEDICATIONS  (STANDING):  amLODIPine   Tablet 2.5 milliGRAM(s) Oral daily  ascorbic acid 500 milliGRAM(s) Oral daily  aspirin enteric coated 81 milliGRAM(s) Oral daily  calcitriol   Capsule 0.25 MICROGram(s) Oral daily  carvedilol 12.5 milliGRAM(s) Oral every 12 hours  cephalexin 500 milliGRAM(s) Oral every 12 hours  clindamycin   Capsule 300 milliGRAM(s) Oral three times a day  dextrose 5%. 1000 milliLiter(s) (50 mL/Hr) IV Continuous <Continuous>  dextrose 50% Injectable 12.5 Gram(s) IV Push once  dextrose 50% Injectable 25 Gram(s) IV Push once  dextrose 50% Injectable 25 Gram(s) IV Push once  docusate sodium 100 milliGRAM(s) Oral two times a day  ferrous    sulfate 325 milliGRAM(s) Oral daily  heparin  Injectable 5000 Unit(s) SubCutaneous every 8 hours  insulin glargine Injectable (LANTUS) 4 Unit(s) SubCutaneous at bedtime  insulin lispro Injectable (HumaLOG) 2 Unit(s) SubCutaneous before lunch  insulin lispro Injectable (HumaLOG) 2 Unit(s) SubCutaneous before dinner  pantoprazole    Tablet 40 milliGRAM(s) Oral before breakfast  sacubitril 49 mG/valsartan 51 mG 1 Tablet(s) Oral daily    MEDICATIONS  (PRN):  acetaminophen   Tablet .. 650 milliGRAM(s) Oral every 6 hours PRN Temp greater or equal to 38C (100.4F), Mild Pain (1 - 3)  dextrose 40% Gel 15 Gram(s) Oral once PRN Blood Glucose LESS THAN 70 milliGRAM(s)/deciliter  glucagon  Injectable 1 milliGRAM(s) IntraMuscular once PRN Glucose LESS THAN 70 milligrams/deciliter  oxyCODONE    5 mG/acetaminophen 325 mG 1 Tablet(s) Oral every 6 hours PRN Moderate Pain (4 - 6)  oxyCODONE    5 mG/acetaminophen 325 mG 2 Tablet(s) Oral every 6 hours PRN Severe Pain (7 - 10)  senna 2 Tablet(s) Oral at bedtime PRN Constipation      Allergies    No Known Allergies    Intolerances        Vital Signs Last 24 Hrs  T(C): 36.4 (2019 23:56), Max: 36.9 (2019 14:12)  T(F): 97.5 (2019 23:56), Max: 98.5 (2019 14:12)  HR: 74 (2019 23:56) (71 - 78)  BP: 116/65 (2019 23:56) (114/64 - 143/73)  BP(mean): --  RR: 18 (2019 23:56) (18 - 18)  SpO2: 98% (2019 23:56) (94% - 98%)  Daily     Daily Weight in k.7 (2019 17:34)    PHYSICAL EXAM:  GENERAL: appears chronically ill  HEAD:  Atraumatic, Normocephalic  EYES: EOMI  NECK: Supple, neck  veins full  NERVOUS SYSTEM:  Alert & Oriented X3  CHEST/LUNG: Clear to percussion bilaterally; No rales  HEART: Regular rate and rhythm; No murmurs  ABDOMEN: Soft, Nontender, Nondistended; Bowel sounds present  EXTREMITIES:  L LE erethyma cellulitis    LABS:                        9.6    4.36  )-----------( 264      ( 2019 09:37 )             32.3     07-21    137  |  93<L>  |  29.0<H>  ----------------------------<  101  4.3   |  28.0  |  4.39<H>    Ca    9.3      2019 09:37  Phos  3.6     20  Mg     2.0         TPro  8.3  /  Alb  3.8  /  TBili  0.7  /  DBili  x   /  AST  16  /  ALT  7   /  AlkPhos  152<H>  19    PT/INR - ( 2019 13:32 )   PT: 16.1 sec;   INR: 1.39 ratio         PTT - ( 2019 13:32 )  PTT:33.8 sec    Magnesium, Serum: 2.0 mg/dL ( @ 14:11)  Phosphorus Level, Serum: 3.6 mg/dL ( @ 14:11)          RADIOLOGY & ADDITIONAL TESTS:

## 2019-07-21 NOTE — DISCHARGE NOTE NURSING/CASE MANAGEMENT/SOCIAL WORK - NSDCPEPT PROEDHF_GEN_ALL_CORE
Low salt diet/Call primary care provider for follow up after discharge/Activities as tolerated/Report signs and symptoms to primary care provider/Monitor weight daily

## 2019-07-29 NOTE — H&P ADULT - NSHPSOCIALHISTORY_GEN_ALL_CORE
CC:  Connor Ocasio is here today for Transitional Care Management     Medications: medications verified and updated  Refills needed today?  NO  denies Latex allergy or sensitivity  Patient would like communication of their results via:      Cell Phone:   Telephone Information:   Mobile [phone]     Okay to leave a message containing results? Yes  Tobacco history: verified  Patient's current [facility] status: Active.    Pharmacy Verified?  Yes    Health Maintenance Due   Topic Date Due   • Diabetes Foot Exam  06/26/1966   • Shingles Vaccine (1 of 2) 06/26/1998   • Abdominal Aortic Aneurysm (AAA) Screening  06/26/2013   • Medicare Wellness 65+  08/18/2018   • Lung Cancer Screening  09/01/2018   • Diabetes A1C  08/19/2019       Patient is due for topics as listed above but is not proceeding with them at this time.              Denies smoking, alcohol, illicit drugs   Ambulates on own at home  Last colonoscopy: 2017 WNL (as per chart 2016)  Last EGD: 2017 WNL as per patient

## 2019-08-16 ENCOUNTER — APPOINTMENT (OUTPATIENT)
Dept: GASTROENTEROLOGY | Facility: CLINIC | Age: 59
End: 2019-08-16
Payer: MEDICARE

## 2019-08-16 VITALS
HEIGHT: 65 IN | RESPIRATION RATE: 14 BRPM | DIASTOLIC BLOOD PRESSURE: 84 MMHG | OXYGEN SATURATION: 95 % | SYSTOLIC BLOOD PRESSURE: 153 MMHG | WEIGHT: 147 LBS | BODY MASS INDEX: 24.49 KG/M2 | HEART RATE: 70 BPM

## 2019-08-16 DIAGNOSIS — K59.04 CHRONIC IDIOPATHIC CONSTIPATION: ICD-10-CM

## 2019-08-16 DIAGNOSIS — Z86.79 PERSONAL HISTORY OF OTHER DISEASES OF THE CIRCULATORY SYSTEM: ICD-10-CM

## 2019-08-16 LAB
ALBUMIN SERPL ELPH-MCNC: 4.1 G/DL
ALP BLD-CCNC: 145 U/L
ALT SERPL-CCNC: 12 U/L
ANION GAP SERPL CALC-SCNC: 17 MMOL/L
AST SERPL-CCNC: 30 U/L
BASOPHILS # BLD AUTO: 0.02 K/UL
BASOPHILS NFR BLD AUTO: 0.4 %
BILIRUB SERPL-MCNC: 0.8 MG/DL
BUN SERPL-MCNC: 38 MG/DL
CALCIUM SERPL-MCNC: 10 MG/DL
CHLORIDE SERPL-SCNC: 90 MMOL/L
CO2 SERPL-SCNC: 28 MMOL/L
CREAT SERPL-MCNC: 4.04 MG/DL
EOSINOPHIL # BLD AUTO: 0.17 K/UL
EOSINOPHIL NFR BLD AUTO: 3.8 %
GLUCOSE SERPL-MCNC: 221 MG/DL
HCT VFR BLD CALC: 34.2 %
HGB BLD-MCNC: 9.9 G/DL
IMM GRANULOCYTES NFR BLD AUTO: 0.2 %
INR PPP: 1.41 RATIO
LYMPHOCYTES # BLD AUTO: 0.5 K/UL
LYMPHOCYTES NFR BLD AUTO: 11.2 %
MAN DIFF?: NORMAL
MCHC RBC-ENTMCNC: 27.4 PG
MCHC RBC-ENTMCNC: 28.9 GM/DL
MCV RBC AUTO: 94.7 FL
MONOCYTES # BLD AUTO: 0.38 K/UL
MONOCYTES NFR BLD AUTO: 8.5 %
NEUTROPHILS # BLD AUTO: 3.4 K/UL
NEUTROPHILS NFR BLD AUTO: 75.9 %
PLATELET # BLD AUTO: 264 K/UL
POTASSIUM SERPL-SCNC: 6.9 MMOL/L
PROT SERPL-MCNC: 8.5 G/DL
PT BLD: 16.1 SEC
RBC # BLD: 3.61 M/UL
RBC # FLD: 17.1 %
SODIUM SERPL-SCNC: 135 MMOL/L
WBC # FLD AUTO: 4.48 K/UL

## 2019-08-16 PROCEDURE — 99214 OFFICE O/P EST MOD 30 MIN: CPT

## 2019-08-16 PROCEDURE — 82272 OCCULT BLD FECES 1-3 TESTS: CPT

## 2019-08-16 NOTE — PHYSICAL EXAM
[General Appearance - Alert] : alert [PERRL With Normal Accommodation] : pupils were equal in size, round, and reactive to light [Sclera] : the sclera and conjunctiva were normal [General Appearance - In No Acute Distress] : in no acute distress [Extraocular Movements] : extraocular movements were intact [Oropharynx] : the oropharynx was normal [Outer Ear] : the ears and nose were normal in appearance [Neck Appearance] : the appearance of the neck was normal [Jugular Venous Distention Increased] : there was no jugular-venous distention [Neck Cervical Mass (___cm)] : no neck mass was observed [Thyroid Diffuse Enlargement] : the thyroid was not enlarged [Thyroid Nodule] : there were no palpable thyroid nodules [Auscultation Breath Sounds / Voice Sounds] : lungs were clear to auscultation bilaterally [Heart Sounds] : normal S1 and S2 [Heart Rate And Rhythm] : heart rate was normal and rhythm regular [Heart Sounds Gallop] : no gallops [Heart Sounds Pericardial Friction Rub] : no pericardial rub [Murmurs] : no murmurs [Abdomen Soft] : soft [Bowel Sounds] : normal bowel sounds [Abdomen Tenderness] : non-tender [] : no hepato-splenomegaly [Abdomen Mass (___ Cm)] : no abdominal mass palpated [Normal Sphincter Tone] : normal sphincter tone [No Rectal Mass] : no rectal mass [Internal Hemorrhoid] : no internal hemorrhoids [External Hemorrhoid] : no external hemorrhoids [Occult Blood Positive] : stool was negative for occult blood [FreeTextEntry1] : No hemorrhoids. No lesions. Normal tone. Brown soft stool. Occult blood negative.

## 2019-08-16 NOTE — ASSESSMENT
[FreeTextEntry1] : Chronic congestive heart failure. Chronic renal failure. Fluid overload with some ascites. History of prior ascites and pleural effusions. No discrete liver disease. Ascites fluid secondary to chronic renal failure.\par Plan:  Abdominal ultrasound to check for ascites and francisco for site of paracentesis. If significant fluid then paracentesis. Blood work to include CBC, CMP, coagulation profiles.\par GI Office followup in 3-6 months. \par Personal history of H. pylori. Previously treated. Urea breath test, requested.\par History of constipation. On chronic iron therapy. Plan use Colace at 2 tabs p.o. b.i.d. In addition to her iron. Okay for p.r.n. use of MiraLax. Hold on further cathartics.\par

## 2019-08-16 NOTE — HISTORY OF PRESENT ILLNESS
[FreeTextEntry1] : 59-year-old,  female, non-English speaking with history of congestive heart failure and chronic renal failure on maintenance hemodialysis 3 times per week, Monday, Wednesday, Friday. Patient had previously been hospitalized at [facility] in mid February or shortness of breath and bilateral pleural effusions and ascites was noted. Ascites was tapped and no infection. SAG was low, consistent with chronic renal failure. No evidence for liver disease was identified on imaging. No SBP. Continues with modest abdominal distention. Decent appetite, but constipated. Moves bowels once every 3-4 days. Has not been compliant with advice for stool softeners. Alleges that MiraLax does not help.  \par No rectal bleeding. No fever. No weight loss. Due for hemodialysis later today.\par Prior upper endoscopy one year ago was negative, but H. pylori was identified. Patient was treated with antibiotics. Patient never had test of cure. Currently asymptomatic from upper GI point of view.\par Personal history of tubular adenomas of the colon, all small;   done 4.5 years ago. No current rectal bleeding. Still with constipation.\par Patient has a Zoll cardiac monitor in place. Motor states that ejection fraction has been 25% and patient has been scheduled for a cardiac procedure. Presumably implantation of an AICD, in the near future.

## 2019-08-17 ENCOUNTER — EMERGENCY (EMERGENCY)
Facility: HOSPITAL | Age: 59
LOS: 1 days | Discharge: HOME | End: 2019-08-17
Attending: EMERGENCY MEDICINE
Payer: MEDICARE

## 2019-08-17 VITALS
DIASTOLIC BLOOD PRESSURE: 86 MMHG | WEIGHT: 149.03 LBS | HEIGHT: 65 IN | SYSTOLIC BLOOD PRESSURE: 158 MMHG | TEMPERATURE: 98 F | HEART RATE: 74 BPM | OXYGEN SATURATION: 99 % | RESPIRATION RATE: 18 BRPM

## 2019-08-17 DIAGNOSIS — Z49.01 ENCOUNTER FOR FITTING AND ADJUSTMENT OF EXTRACORPOREAL DIALYSIS CATHETER: Chronic | ICD-10-CM

## 2019-08-17 DIAGNOSIS — I77.0 ARTERIOVENOUS FISTULA, ACQUIRED: Chronic | ICD-10-CM

## 2019-08-17 LAB
ANION GAP SERPL CALC-SCNC: 16 MMOL/L — SIGNIFICANT CHANGE UP (ref 5–17)
BUN SERPL-MCNC: 29 MG/DL — HIGH (ref 8–20)
CALCIUM SERPL-MCNC: 9.4 MG/DL — SIGNIFICANT CHANGE UP (ref 8.6–10.2)
CHLORIDE SERPL-SCNC: 89 MMOL/L — LOW (ref 98–107)
CO2 SERPL-SCNC: 24 MMOL/L — SIGNIFICANT CHANGE UP (ref 22–29)
CREAT SERPL-MCNC: 3.6 MG/DL — HIGH (ref 0.5–1.3)
GLUCOSE SERPL-MCNC: 274 MG/DL — HIGH (ref 70–115)
POTASSIUM SERPL-MCNC: 5.2 MMOL/L — SIGNIFICANT CHANGE UP (ref 3.5–5.3)
POTASSIUM SERPL-SCNC: 5.2 MMOL/L — SIGNIFICANT CHANGE UP (ref 3.5–5.3)
SODIUM SERPL-SCNC: 129 MMOL/L — LOW (ref 135–145)

## 2019-08-17 PROCEDURE — 93010 ELECTROCARDIOGRAM REPORT: CPT

## 2019-08-17 PROCEDURE — 93005 ELECTROCARDIOGRAM TRACING: CPT

## 2019-08-17 PROCEDURE — 80048 BASIC METABOLIC PNL TOTAL CA: CPT

## 2019-08-17 PROCEDURE — 99284 EMERGENCY DEPT VISIT MOD MDM: CPT

## 2019-08-17 PROCEDURE — 36415 COLL VENOUS BLD VENIPUNCTURE: CPT

## 2019-08-17 PROCEDURE — 99283 EMERGENCY DEPT VISIT LOW MDM: CPT

## 2019-08-17 PROCEDURE — T1013: CPT

## 2019-08-17 NOTE — ED STATDOCS - OBJECTIVE STATEMENT
60 y/o F pt with PMHx of presents to the ED sent by MD for hyperkalemia. Pt is accompanied and Thai translated by son. Per son, pt had blood work done yesterday morning and was on dialysis yesterday afternoon. Son says that her MD called today to refer pt to the ED for hyperkalemia. Denies recent CP, difficulty breathing. No further acute complaints at this time.

## 2019-08-17 NOTE — ED STATDOCS - PROGRESS NOTE DETAILS
Results noted and findings d/w pt. Pt advised to f/u with PMD for incidental hyponatremia and also advised low potassium diet and dialysis and usual MWF

## 2019-08-19 LAB — UREA BREATH TEST QL: NEGATIVE

## 2019-08-27 NOTE — ED ADULT NURSE NOTE - ALCOHOL PRE SCREEN (AUDIT - C)
Patient is calling to speak with a nurse regarding her medications. She can be reached at [phone]. Statement Selected

## 2019-09-01 PROCEDURE — G9005: CPT

## 2019-09-13 NOTE — PATIENT PROFILE ADULT - BRADEN ACTIVITY
Spoke to Jody Infante with [facility] in regards to the turn around time for CF and SMA screenings (blood). She reports it's up to 8 days for results for both.    (3) walks occasionally

## 2019-09-20 NOTE — PATIENT PROFILE ADULT - HAS THE PATIENT EXPERIENCED ANY OF THE FOLLOWING WITHIN THE WEEK PRIOR TO ADMISSION?
FUV 9/23/19  Last seen: 8/12/19  Last refilled: 9/9/19 #28 no refills  Medication: clonazePAM (KLONOPIN) 0.5 MG tablet  Take 1 tablet by mouth 2 times daily. - Oral  Last note reviewed: rtc 4 weeks    Is the patient due for refill of this medication(s): Yes  PDMP review: Criteria met. Forwarded to Physician/ALIDA for signature.   Last dispensed 9/9/19.   no

## 2019-09-21 ENCOUNTER — INPATIENT (INPATIENT)
Facility: HOSPITAL | Age: 59
LOS: 2 days | Discharge: HOME | DRG: 291 | End: 2019-09-24
Attending: INTERNAL MEDICINE | Admitting: HOSPITALIST
Payer: MEDICARE

## 2019-09-21 VITALS
DIASTOLIC BLOOD PRESSURE: 82 MMHG | RESPIRATION RATE: 18 BRPM | WEIGHT: 149.91 LBS | HEART RATE: 76 BPM | SYSTOLIC BLOOD PRESSURE: 174 MMHG | HEIGHT: 65 IN | OXYGEN SATURATION: 99 % | TEMPERATURE: 99 F

## 2019-09-21 DIAGNOSIS — I77.0 ARTERIOVENOUS FISTULA, ACQUIRED: Chronic | ICD-10-CM

## 2019-09-21 DIAGNOSIS — Z49.01 ENCOUNTER FOR FITTING AND ADJUSTMENT OF EXTRACORPOREAL DIALYSIS CATHETER: Chronic | ICD-10-CM

## 2019-09-21 LAB
ALBUMIN SERPL ELPH-MCNC: 3.8 G/DL — SIGNIFICANT CHANGE UP (ref 3.3–5.2)
ALP SERPL-CCNC: 114 U/L — SIGNIFICANT CHANGE UP (ref 40–120)
ALT FLD-CCNC: 9 U/L — SIGNIFICANT CHANGE UP
ANION GAP SERPL CALC-SCNC: 16 MMOL/L — SIGNIFICANT CHANGE UP (ref 5–17)
APTT BLD: 32 SEC — SIGNIFICANT CHANGE UP (ref 27.5–36.3)
AST SERPL-CCNC: 24 U/L — SIGNIFICANT CHANGE UP
BASOPHILS # BLD AUTO: 0.04 K/UL — SIGNIFICANT CHANGE UP (ref 0–0.2)
BASOPHILS NFR BLD AUTO: 0.9 % — SIGNIFICANT CHANGE UP (ref 0–2)
BILIRUB SERPL-MCNC: 0.6 MG/DL — SIGNIFICANT CHANGE UP (ref 0.4–2)
BLD GP AB SCN SERPL QL: SIGNIFICANT CHANGE UP
BUN SERPL-MCNC: 26 MG/DL — HIGH (ref 8–20)
CALCIUM SERPL-MCNC: 9.7 MG/DL — SIGNIFICANT CHANGE UP (ref 8.6–10.2)
CHLORIDE SERPL-SCNC: 88 MMOL/L — LOW (ref 98–107)
CO2 SERPL-SCNC: 27 MMOL/L — SIGNIFICANT CHANGE UP (ref 22–29)
CREAT SERPL-MCNC: 3.79 MG/DL — HIGH (ref 0.5–1.3)
EOSINOPHIL # BLD AUTO: 0.08 K/UL — SIGNIFICANT CHANGE UP (ref 0–0.5)
EOSINOPHIL NFR BLD AUTO: 1.9 % — SIGNIFICANT CHANGE UP (ref 0–6)
GLUCOSE SERPL-MCNC: 309 MG/DL — HIGH (ref 70–115)
HCT VFR BLD CALC: 37.6 % — SIGNIFICANT CHANGE UP (ref 34.5–45)
HGB BLD-MCNC: 11.6 G/DL — SIGNIFICANT CHANGE UP (ref 11.5–15.5)
IMM GRANULOCYTES NFR BLD AUTO: 0.2 % — SIGNIFICANT CHANGE UP (ref 0–1.5)
INR BLD: 1.13 RATIO — SIGNIFICANT CHANGE UP (ref 0.88–1.16)
LACTATE BLDV-MCNC: 1.3 MMOL/L — SIGNIFICANT CHANGE UP (ref 0.5–2)
LIDOCAIN IGE QN: 66 U/L — HIGH (ref 22–51)
LYMPHOCYTES # BLD AUTO: 0.75 K/UL — LOW (ref 1–3.3)
LYMPHOCYTES # BLD AUTO: 17.5 % — SIGNIFICANT CHANGE UP (ref 13–44)
MCHC RBC-ENTMCNC: 28 PG — SIGNIFICANT CHANGE UP (ref 27–34)
MCHC RBC-ENTMCNC: 30.9 GM/DL — LOW (ref 32–36)
MCV RBC AUTO: 90.8 FL — SIGNIFICANT CHANGE UP (ref 80–100)
MONOCYTES # BLD AUTO: 0.4 K/UL — SIGNIFICANT CHANGE UP (ref 0–0.9)
MONOCYTES NFR BLD AUTO: 9.3 % — SIGNIFICANT CHANGE UP (ref 2–14)
NEUTROPHILS # BLD AUTO: 3.01 K/UL — SIGNIFICANT CHANGE UP (ref 1.8–7.4)
NEUTROPHILS NFR BLD AUTO: 70.2 % — SIGNIFICANT CHANGE UP (ref 43–77)
PLATELET # BLD AUTO: 251 K/UL — SIGNIFICANT CHANGE UP (ref 150–400)
POTASSIUM SERPL-MCNC: 5 MMOL/L — SIGNIFICANT CHANGE UP (ref 3.5–5.3)
POTASSIUM SERPL-SCNC: 5 MMOL/L — SIGNIFICANT CHANGE UP (ref 3.5–5.3)
PROT SERPL-MCNC: 8.4 G/DL — SIGNIFICANT CHANGE UP (ref 6.6–8.7)
PROTHROM AB SERPL-ACNC: 13.1 SEC — HIGH (ref 10–12.9)
RBC # BLD: 4.14 M/UL — SIGNIFICANT CHANGE UP (ref 3.8–5.2)
RBC # FLD: 15.9 % — HIGH (ref 10.3–14.5)
SODIUM SERPL-SCNC: 131 MMOL/L — LOW (ref 135–145)
WBC # BLD: 4.29 K/UL — SIGNIFICANT CHANGE UP (ref 3.8–10.5)
WBC # FLD AUTO: 4.29 K/UL — SIGNIFICANT CHANGE UP (ref 3.8–10.5)

## 2019-09-21 PROCEDURE — 74176 CT ABD & PELVIS W/O CONTRAST: CPT | Mod: 26

## 2019-09-21 PROCEDURE — 99285 EMERGENCY DEPT VISIT HI MDM: CPT

## 2019-09-21 RX ORDER — SODIUM CHLORIDE 9 MG/ML
1000 INJECTION INTRAMUSCULAR; INTRAVENOUS; SUBCUTANEOUS
Refills: 0 | Status: DISCONTINUED | OUTPATIENT
Start: 2019-09-21 | End: 2019-09-22

## 2019-09-21 RX ORDER — MORPHINE SULFATE 50 MG/1
2 CAPSULE, EXTENDED RELEASE ORAL ONCE
Refills: 0 | Status: DISCONTINUED | OUTPATIENT
Start: 2019-09-21 | End: 2019-09-21

## 2019-09-21 RX ORDER — METOCLOPRAMIDE HCL 10 MG
10 TABLET ORAL ONCE
Refills: 0 | Status: COMPLETED | OUTPATIENT
Start: 2019-09-21 | End: 2019-09-21

## 2019-09-21 RX ORDER — PANTOPRAZOLE SODIUM 20 MG/1
40 TABLET, DELAYED RELEASE ORAL ONCE
Refills: 0 | Status: COMPLETED | OUTPATIENT
Start: 2019-09-21 | End: 2019-09-21

## 2019-09-21 RX ORDER — ONDANSETRON 8 MG/1
4 TABLET, FILM COATED ORAL ONCE
Refills: 0 | Status: COMPLETED | OUTPATIENT
Start: 2019-09-21 | End: 2019-09-21

## 2019-09-21 RX ADMIN — MORPHINE SULFATE 2 MILLIGRAM(S): 50 CAPSULE, EXTENDED RELEASE ORAL at 20:45

## 2019-09-21 RX ADMIN — ONDANSETRON 4 MILLIGRAM(S): 8 TABLET, FILM COATED ORAL at 20:14

## 2019-09-21 RX ADMIN — MORPHINE SULFATE 2 MILLIGRAM(S): 50 CAPSULE, EXTENDED RELEASE ORAL at 20:14

## 2019-09-21 RX ADMIN — SODIUM CHLORIDE 75 MILLILITER(S): 9 INJECTION INTRAMUSCULAR; INTRAVENOUS; SUBCUTANEOUS at 20:14

## 2019-09-21 NOTE — ED PROVIDER NOTE - PHYSICAL EXAMINATION
constant abd pain. when she eats she vomits, for 2 weeks. As well as abd pain. has not seen her pmmd, because he is on vacation. seen a gastroenterologist for similar symptoms.   estrd, dialysis for year 1/2. her kidneys are not functioning secondary to her DM. DM on insulin. sx: to have something placed to check the blood vessels of the heart 3-4 months ago. christiana recorder if it works out well shell have a pacemaker placed. Pt took off the lifevest today. was tolsd she had water in her belly and had a needle placed to remove the water adnmitted in [facility] for that. 6 months ago. Cp. on oxygen at home, 12L per minute.   denies sob. had never had an endoscopy. had a colonoscopy. did not miss any medication.passing gas. does not make urine, denies hematuria, blood in stool. Last mweal was chicken soup. food does not affect the pain. checks her sugar at home, on a bad day it is 120.  PMD: Eligio  cardiologist:trisha  nephrologist:  gastroenterolotist:laura robertson  : Julieth. Constitutional : Appears uncomfortable, weak, talking softly   Head :NC AT , no swelling  Eyes :eomi, no swelling  Mouth :mm moist,  Neck : supple, trachea in midline  Chest :Kel air entry, symm chest expansion, no distress  Heart :S1 S2 distant  Abdomen :abd soft, non tender  Musc/Skel :ext no swelling, no deformity, no spine tenderness, distal pulses present  Skin: Yellow tone, Dry and exfoliated   Neuro  :AAO 3 no focal deficits     yellow turn  mm dry  kel crackles on bases  abd distended, tympanic bowel sounds, no guarding, no rebound, umibilical hernai reduced, no groin erythema Constitutional : Appears uncomfortable, weak, talking softly   Head :NC AT , no swelling  Eyes :eomi, no swelling  Mouth :mm dry,  Neck : supple, trachea in midline  Chest :Kel crackles at the bases symm chest expansion, no distress  Heart :S1 S2 distant  Abdomen :abd soft, non tender, abd distended, tympanic bowel sounds, no guarding, no rebound. Umbilical hernia reduced. No groin erythema  Musc/Skel :ext no swelling, no deformity, no spine tenderness, distal pulses present  Skin: Yellow tone, Dry and exfoliated .  Neuro  :AAO 3 no focal deficits

## 2019-09-21 NOTE — ED STATDOCS - PROGRESS NOTE DETAILS
58 y/o F pt with pmhx significant for on dialysis (last dialysis yesterday), anemia, CKD, DM, pleural effusion, HTN, A-Fib presents to the ED with daughter c/o abdominal pain and distension which onset 2 weeks ago. Pt states that she began to notice swelling above her umbilicus 2 months ago, and has no history of hernias. Pt denies any pain aside from pain over the region of swelling above her stomach, and states that the pain radiates to the entirety of her stomach. Pain wanes between a score of 7-9, but is otherwise constant. Also reports vomiting, last episode earlier today. Pt complains of constipation, with last normal BM being 2 days ago, although she normally has 1-2 days between BM's. Pt describes stool as dark, black, and states that it only began to be dark about 2 weeks ago. C/o chills and generalized weakness which began this week. Per daughter, pt had an injection on her right eye earlier today. No recent changes in medication. Hx of colonoscopy 6 months ago, told she had bacteria in her stomach. Pt does not urinate normally. No blood thinners aside from a regular aspirin. Denies bloody stool. Denies bilious or bloody vomit, fever, diaphoresis, syncope. : Shayla hernandez, protocol orders entered. Pt to be moved to main ED for further evaluation by another provider. 60 y/o F pt with pmhx significant for on dialysis (last dialysis yesterday), anemia, CKD, DM, pleural effusion, HTN, A-Fib presents to the ED with daughter c/o abdominal pain and distension which onset 2 weeks ago. Pt states that she began to notice swelling above her umbilicus 2 months ago, and has no history of hernias. Pt denies any pain aside from pain over the region of swelling above her stomach, and states that the pain radiates to the entirety of her stomach. Pain wanes between a score of 7-9, but is otherwise constant. Also reports vomiting, last episode earlier today. Pt complains of constipation, with last normal BM being 2 days ago, although she normally has 1-2 days between BM's. Pt describes stool as dark, black, and states that it only began to be dark about 2 weeks ago. C/o chills and generalized weakness which began this week. Per daughter, pt had an injection on her right eye earlier today. No recent changes in medication. Hx of colonoscopy 6 months ago, told she had bacteria in her stomach. Pt does not urinate normally. No blood thinners aside from a regular aspirin. Denies bloody stool. Denies bilious or bloody vomit, fever, diaphoresis, syncope. : Shayla  PE: Umbilical hernia with blueish tone, able to reduce.   Focused eval, protocol orders entered. Pt to be moved to main ED for further evaluation by another provider. 58 y/o F pt with pmhx significant for on dialysis (last dialysis yesterday), anemia, CKD, DM, pleural effusion, HTN, A-Fib presents to the ED with daughter c/o abdominal pain and distension which onset 2 weeks ago. Pt states that she began to notice swelling above her umbilicus 2 months ago, and has no past history of hernias. Pt denies any pain aside from pain over the region of swelling above her stomach, and states that the pain radiates to the entirety of her stomach. Pain wanes between a score of 7-9, but is otherwise constant. Also reports vomiting, last episode earlier today. Pt complains of constipation, with last normal BM being 2 days ago, although she normally has 1-2 days between BM's. Pt describes stool as dark, black, and states that it only began to be dark about 2 weeks ago. C/o chills and generalized weakness which began this week. Per daughter, pt had an injection on her right eye earlier today. No recent changes in medication. Hx of colonoscopy 6 months ago, told she had bacteria in her stomach. Pt does not urinate normally. No blood thinners aside from a regular aspirin. Denies bloody stool. Denies bilious or bloody vomit, fever, diaphoresis, syncope. : Shayla  PE: Umbilical hernia with blueish tone, able to reduce.  Stool brown  Focused eval, protocol orders entered. Pt to be moved to main ED for further evaluation by another provider.

## 2019-09-21 NOTE — ED PROVIDER NOTE - CARE PLAN
Principal Discharge DX:	Abdominal pain  Secondary Diagnosis:	SBP (spontaneous bacterial peritonitis)

## 2019-09-21 NOTE — ED STATDOCS - NS_ATTENDINGSCRIBE_ED_ALL_ED
All bs are normal range  Some pressure  GBS negative  Labor precautions given   I personally performed the service described in the documentation recorded by the scribe in my presence, and it accurately and completely records my words and actions.

## 2019-09-21 NOTE — ED PROVIDER NOTE - OBJECTIVE STATEMENT
History Source; Pt  60 y/o F pt with significant PMHx of presents to the ED c/o History Source; Pt  58 y/o F pt with significant PMHx of DM (Type II, on Insulin), ENRD, presents to the ED c/o constant abd pain for two weeks, as well as N/V and CP. Pt states today she had chicken noodle soup, but she has been unable to keep most foods down. She has not seen her PMD, because he is currently on vacation. She is able to pass gas, but her last bowel movement was 3 days ago. At home she is on 12L of oxygen. Denies SOB, hematuria.     Pt was placed on dialysis 1 1/2 years ago due to her kidneys stop functioning, secondary to DM. In the past she has had similar symptoms and saw a gastroenterologist at that time. As per pt 6 months ago, she has been told that she has water present in her abdomen, and was admitted to [facility] to have t removed with a needle. Approximately 3-4 months ago she had a Cherrie recorder placed, and was told if everything is normal she will have a pacemaker placed.  PMD: // Cardiologist: // Gastroenterolotist:  group  : Coni History Source; Pt  58 y/o F pt with significant PMHx of DM (Type II, on Insulin), ENRD, presents to the ED c/o constant abd pain for two weeks, as well as N/V and CP. Pt states today she had chicken noodle soup, but she has been unable to keep most foods down. She has not seen her PMD, because he is currently on vacation. Today due to the pain she removed her cardiac life vest. She is able to pass gas, but her last bowel movement was 3 days ago. At home she is on 12L of oxygen. Denies SOB, hematuria.     Pt was placed on dialysis 1 1/2 years ago due to her kidneys stop functioning, secondary to DM. In the past she has had similar symptoms and saw a gastroenterologist at that time. As per pt 6 months ago, she has been told that she has water present in her abdomen, and was admitted to [facility] to have t removed with a needle. Approximately 3-4 months ago she had a Cherrie recorder placed, and was told if everything is normal she will have a pacemaker placed.  PMD: // Cardiologist: // Gastroenterolotist:  group  : Coni

## 2019-09-21 NOTE — ED ADULT NURSE NOTE - NSIMPLEMENTINTERV_GEN_ALL_ED
Implemented All Universal Safety Interventions:  [place] to call system. Call bell, personal items and telephone within reach. Instruct patient to call for assistance. Room bathroom lighting operational. Non-slip footwear when patient is off stretcher. Physically safe environment: no spills, clutter or unnecessary equipment. Stretcher in lowest position, wheels locked, appropriate side rails in place.

## 2019-09-21 NOTE — ED PROVIDER NOTE - NS ED ROS FT
no weight change, no fever or chills  no recent travel, no recent abox, no sick contacts  no recent change in medications  no rash, no bruises  no visual changes no eye discharge  no cough cold or congestion,   no sob, (+) chest pain  follows with cardiology  no stress test, no cath  no orthopnea, no pnd  (+) abd pain, (+) n/v  no endoscopy, no colonoscopy  no hematuria, no change in urinary habits  no joint pain, no deformity  no headache, no paresthesia

## 2019-09-21 NOTE — ED ADULT NURSE NOTE - OBJECTIVE STATEMENT
59y female A&Ox4 c/o abdominal pain. Pt states she has had sharp abdominal pain that began approximately 2 weeks ago that has gotten progressively worse. Pt states she is unable to tolerate PO intake, for duration of pain, for the past week pt has had episodes of vomiting with no evidence of blood in vomitus. Pt states she has been constipated for the past 3 days and has noticed her belly getting bigger. Pt denies any shortness of breath, chest pain, changes in  habits.

## 2019-09-21 NOTE — ED PROVIDER NOTE - CLINICAL SUMMARY MEDICAL DECISION MAKING FREE TEXT BOX
58 y/o F with Hx DM, ENRD on dialysis. Presents to the ER with 2 weeks of abdominal distention, N/V. On left distal arm fistula noted, will do labs, CT, EKG and provide medications, and re-asses.

## 2019-09-22 DIAGNOSIS — R10.9 UNSPECIFIED ABDOMINAL PAIN: ICD-10-CM

## 2019-09-22 DIAGNOSIS — K29.00 ACUTE GASTRITIS WITHOUT BLEEDING: ICD-10-CM

## 2019-09-22 DIAGNOSIS — N18.6 END STAGE RENAL DISEASE: ICD-10-CM

## 2019-09-22 DIAGNOSIS — E11.69 TYPE 2 DIABETES MELLITUS WITH OTHER SPECIFIED COMPLICATION: ICD-10-CM

## 2019-09-22 DIAGNOSIS — I10 ESSENTIAL (PRIMARY) HYPERTENSION: ICD-10-CM

## 2019-09-22 DIAGNOSIS — I50.9 HEART FAILURE, UNSPECIFIED: ICD-10-CM

## 2019-09-22 LAB
GLUCOSE BLDC GLUCOMTR-MCNC: 205 MG/DL — HIGH (ref 70–99)
GLUCOSE BLDC GLUCOMTR-MCNC: 279 MG/DL — HIGH (ref 70–99)
GLUCOSE BLDC GLUCOMTR-MCNC: 363 MG/DL — HIGH (ref 70–99)

## 2019-09-22 PROCEDURE — 49083 ABD PARACENTESIS W/IMAGING: CPT

## 2019-09-22 RX ORDER — ATORVASTATIN CALCIUM 80 MG/1
40 TABLET, FILM COATED ORAL AT BEDTIME
Refills: 0 | Status: DISCONTINUED | OUTPATIENT
Start: 2019-09-22 | End: 2019-09-24

## 2019-09-22 RX ORDER — SACUBITRIL AND VALSARTAN 24; 26 MG/1; MG/1
1 TABLET, FILM COATED ORAL
Refills: 0 | Status: DISCONTINUED | OUTPATIENT
Start: 2019-09-22 | End: 2019-09-22

## 2019-09-22 RX ORDER — CEFEPIME 1 G/1
1000 INJECTION, POWDER, FOR SOLUTION INTRAMUSCULAR; INTRAVENOUS ONCE
Refills: 0 | Status: COMPLETED | OUTPATIENT
Start: 2019-09-22 | End: 2019-09-22

## 2019-09-22 RX ORDER — SPIRONOLACTONE 25 MG/1
25 TABLET, FILM COATED ORAL DAILY
Refills: 0 | Status: DISCONTINUED | OUTPATIENT
Start: 2019-09-22 | End: 2019-09-23

## 2019-09-22 RX ORDER — CARVEDILOL PHOSPHATE 80 MG/1
25 CAPSULE, EXTENDED RELEASE ORAL EVERY 12 HOURS
Refills: 0 | Status: DISCONTINUED | OUTPATIENT
Start: 2019-09-22 | End: 2019-09-24

## 2019-09-22 RX ORDER — INSULIN LISPRO 100/ML
VIAL (ML) SUBCUTANEOUS
Refills: 0 | Status: DISCONTINUED | OUTPATIENT
Start: 2019-09-22 | End: 2019-09-24

## 2019-09-22 RX ORDER — INSULIN GLARGINE 100 [IU]/ML
4 INJECTION, SOLUTION SUBCUTANEOUS EVERY MORNING
Refills: 0 | Status: DISCONTINUED | OUTPATIENT
Start: 2019-09-22 | End: 2019-09-23

## 2019-09-22 RX ORDER — DEXTROSE 50 % IN WATER 50 %
15 SYRINGE (ML) INTRAVENOUS ONCE
Refills: 0 | Status: DISCONTINUED | OUTPATIENT
Start: 2019-09-22 | End: 2019-09-24

## 2019-09-22 RX ORDER — ASPIRIN/CALCIUM CARB/MAGNESIUM 324 MG
81 TABLET ORAL DAILY
Refills: 0 | Status: DISCONTINUED | OUTPATIENT
Start: 2019-09-22 | End: 2019-09-24

## 2019-09-22 RX ORDER — DEXTROSE 50 % IN WATER 50 %
12.5 SYRINGE (ML) INTRAVENOUS ONCE
Refills: 0 | Status: DISCONTINUED | OUTPATIENT
Start: 2019-09-22 | End: 2019-09-24

## 2019-09-22 RX ORDER — LISINOPRIL 2.5 MG/1
40 TABLET ORAL DAILY
Refills: 0 | Status: DISCONTINUED | OUTPATIENT
Start: 2019-09-22 | End: 2019-09-24

## 2019-09-22 RX ORDER — INSULIN LISPRO 100/ML
VIAL (ML) SUBCUTANEOUS
Refills: 0 | Status: DISCONTINUED | OUTPATIENT
Start: 2019-09-22 | End: 2019-09-22

## 2019-09-22 RX ORDER — CALCIUM CARBONATE 500(1250)
1 TABLET ORAL THREE TIMES A DAY
Refills: 0 | Status: DISCONTINUED | OUTPATIENT
Start: 2019-09-22 | End: 2019-09-24

## 2019-09-22 RX ORDER — SACCHAROMYCES BOULARDII 250 MG
250 POWDER IN PACKET (EA) ORAL
Refills: 0 | Status: DISCONTINUED | OUTPATIENT
Start: 2019-09-22 | End: 2019-09-23

## 2019-09-22 RX ORDER — SODIUM CHLORIDE 9 MG/ML
1000 INJECTION, SOLUTION INTRAVENOUS
Refills: 0 | Status: DISCONTINUED | OUTPATIENT
Start: 2019-09-22 | End: 2019-09-22

## 2019-09-22 RX ORDER — HYDRALAZINE HCL 50 MG
50 TABLET ORAL
Refills: 0 | Status: DISCONTINUED | OUTPATIENT
Start: 2019-09-22 | End: 2019-09-24

## 2019-09-22 RX ORDER — DEXTROSE 50 % IN WATER 50 %
25 SYRINGE (ML) INTRAVENOUS ONCE
Refills: 0 | Status: DISCONTINUED | OUTPATIENT
Start: 2019-09-22 | End: 2019-09-24

## 2019-09-22 RX ORDER — CARVEDILOL PHOSPHATE 80 MG/1
12.5 CAPSULE, EXTENDED RELEASE ORAL EVERY 12 HOURS
Refills: 0 | Status: DISCONTINUED | OUTPATIENT
Start: 2019-09-22 | End: 2019-09-22

## 2019-09-22 RX ORDER — CEFEPIME 1 G/1
INJECTION, POWDER, FOR SOLUTION INTRAMUSCULAR; INTRAVENOUS
Refills: 0 | Status: DISCONTINUED | OUTPATIENT
Start: 2019-09-22 | End: 2019-09-22

## 2019-09-22 RX ORDER — GLUCAGON INJECTION, SOLUTION 0.5 MG/.1ML
1 INJECTION, SOLUTION SUBCUTANEOUS ONCE
Refills: 0 | Status: DISCONTINUED | OUTPATIENT
Start: 2019-09-22 | End: 2019-09-24

## 2019-09-22 RX ORDER — AMLODIPINE BESYLATE 2.5 MG/1
2.5 TABLET ORAL DAILY
Refills: 0 | Status: DISCONTINUED | OUTPATIENT
Start: 2019-09-22 | End: 2019-09-22

## 2019-09-22 RX ORDER — CEFEPIME 1 G/1
1000 INJECTION, POWDER, FOR SOLUTION INTRAMUSCULAR; INTRAVENOUS DAILY
Refills: 0 | Status: DISCONTINUED | OUTPATIENT
Start: 2019-09-22 | End: 2019-09-23

## 2019-09-22 RX ORDER — ACETAMINOPHEN 500 MG
650 TABLET ORAL EVERY 6 HOURS
Refills: 0 | Status: DISCONTINUED | OUTPATIENT
Start: 2019-09-22 | End: 2019-09-24

## 2019-09-22 RX ORDER — MORPHINE SULFATE 50 MG/1
2 CAPSULE, EXTENDED RELEASE ORAL EVERY 6 HOURS
Refills: 0 | Status: DISCONTINUED | OUTPATIENT
Start: 2019-09-22 | End: 2019-09-23

## 2019-09-22 RX ORDER — ENOXAPARIN SODIUM 100 MG/ML
30 INJECTION SUBCUTANEOUS DAILY
Refills: 0 | Status: DISCONTINUED | OUTPATIENT
Start: 2019-09-22 | End: 2019-09-22

## 2019-09-22 RX ORDER — PANTOPRAZOLE SODIUM 20 MG/1
40 TABLET, DELAYED RELEASE ORAL
Refills: 0 | Status: DISCONTINUED | OUTPATIENT
Start: 2019-09-22 | End: 2019-09-24

## 2019-09-22 RX ADMIN — CEFEPIME 100 MILLIGRAM(S): 1 INJECTION, POWDER, FOR SOLUTION INTRAMUSCULAR; INTRAVENOUS at 10:02

## 2019-09-22 RX ADMIN — PANTOPRAZOLE SODIUM 40 MILLIGRAM(S): 20 TABLET, DELAYED RELEASE ORAL at 17:50

## 2019-09-22 RX ADMIN — Medication 50 MILLIGRAM(S): at 17:51

## 2019-09-22 RX ADMIN — Medication 81 MILLIGRAM(S): at 11:52

## 2019-09-22 RX ADMIN — CARVEDILOL PHOSPHATE 25 MILLIGRAM(S): 80 CAPSULE, EXTENDED RELEASE ORAL at 17:52

## 2019-09-22 RX ADMIN — Medication 2: at 08:35

## 2019-09-22 RX ADMIN — Medication 1 TABLET(S): at 11:53

## 2019-09-22 RX ADMIN — Medication 1 TABLET(S): at 07:48

## 2019-09-22 RX ADMIN — SACUBITRIL AND VALSARTAN 1 TABLET(S): 24; 26 TABLET, FILM COATED ORAL at 07:48

## 2019-09-22 RX ADMIN — Medication 250 MILLIGRAM(S): at 17:50

## 2019-09-22 RX ADMIN — Medication 3: at 11:54

## 2019-09-22 RX ADMIN — LISINOPRIL 40 MILLIGRAM(S): 2.5 TABLET ORAL at 17:52

## 2019-09-22 RX ADMIN — PANTOPRAZOLE SODIUM 40 MILLIGRAM(S): 20 TABLET, DELAYED RELEASE ORAL at 08:33

## 2019-09-22 RX ADMIN — CARVEDILOL PHOSPHATE 12.5 MILLIGRAM(S): 80 CAPSULE, EXTENDED RELEASE ORAL at 07:49

## 2019-09-22 RX ADMIN — ATORVASTATIN CALCIUM 40 MILLIGRAM(S): 80 TABLET, FILM COATED ORAL at 22:19

## 2019-09-22 RX ADMIN — Medication 104 MILLIGRAM(S): at 00:02

## 2019-09-22 RX ADMIN — INSULIN GLARGINE 4 UNIT(S): 100 INJECTION, SOLUTION SUBCUTANEOUS at 08:33

## 2019-09-22 RX ADMIN — Medication 10: at 17:50

## 2019-09-22 RX ADMIN — Medication 1 TABLET(S): at 22:19

## 2019-09-22 RX ADMIN — Medication 10 MILLIGRAM(S): at 01:19

## 2019-09-22 RX ADMIN — AMLODIPINE BESYLATE 2.5 MILLIGRAM(S): 2.5 TABLET ORAL at 07:49

## 2019-09-22 RX ADMIN — PANTOPRAZOLE SODIUM 40 MILLIGRAM(S): 20 TABLET, DELAYED RELEASE ORAL at 00:02

## 2019-09-22 NOTE — CHART NOTE - NSCHARTNOTEFT_GEN_A_CORE
Post midnight admission brief note.   Patient was seen and examined by overnight hospitalist this am.   HPI reviewed. Labs, vitals noted.   I have personally seen and examined this patient today while rounding with residents.  Patient reports 2 weeks of abdominal pain and vomiting.  Patient is alert, and oriented, NAD.  Calm, comfortable.    Cardio: S1, S2, RRR  Lungs: + fine crackles at the bases.  ABD: diffuse tenderness., soft, distended.  Ext: NO edema    A/P Will continue with IV antibiotics for possible SBP.  Contact IR for paracentesis.

## 2019-09-22 NOTE — H&P ADULT - MOUTH
[FreeTextEntry1] : Symptoms likely due to viral URI. Recommend supportive care including antipyretics, fluids, and nasal saline followed by nasal suction. Return if symptoms worsen or persist.\par  moist

## 2019-09-22 NOTE — CONSULT NOTE ADULT - ASSESSMENT
ESRd on HD MWF schedule   No urgent need for HD today will arrange HD for tomorrow  Consent in chart    Abdominal pain + ascites paracentesis? r/o SBP    Anemia no need for MARGI at this time    HTN BP not ideal will reasses after UF removal on HD    will follow

## 2019-09-22 NOTE — CHART NOTE - NSCHARTNOTEFT_GEN_A_CORE
Contacted patient's Son, Feliciano Elizabeth who stated patient's daughter in law (Mrs. Betancur)  is in charge of the patient medication.  Mrs. Betancur not at home at this time, has no acces to medication list but stated all medications are from [facility].     Contacted [facility] and patient medication last refilled on 08.21.2019, as follow:  - Hydralazine 50 mg PO BID  - Spironolactone 25 mg PO qd  - Carvedilol 25 mg PO BID  - Lisinopril 40 mg PO qd  - Iron supplementation 325 mg PO qd  - Atorvastatin 40 mg qd.    Will contact family member to go over medication list once again.

## 2019-09-22 NOTE — H&P ADULT - ASSESSMENT
58 y/o female with abdominal pain and vomiting, large Ascites possible SBP, gastritis, ESRD on HD, CAD, chronic CHF, HTN, DM II, paroxysmal A Fib

## 2019-09-22 NOTE — H&P ADULT - HISTORY OF PRESENT ILLNESS
58 y/o female with h/o Ascites, ESRD on HD, DM II, paroxysmal A Fib on no anticoagulation for h/o GI bleed, chronic CHF, non obstructing CAD, pericarditis s/p pericardiocentesis, came to the ER because of vague upper abdominal pain and vomiting for more than 1 week. no hematemesis or blood in the stools. no fever but chills. o/e: diffuse abdominal tenderness with purple discoloration of umbilicus CT scan showed large ascites.

## 2019-09-22 NOTE — H&P ADULT - PROBLEM SELECTOR PLAN 4
insulin therapy protocol with accu checks pre meals with no bedtime check to avoid overnight hypoglycemia

## 2019-09-22 NOTE — ED ADULT NURSE REASSESSMENT NOTE - NS ED NURSE REASSESS COMMENT FT1
Pt sleeping in stretcher in no apparent signs of distress, pt denies any additional complaints of pain or discomfort at this time. Pt remains on cardiac monitor, PIV site WNL. Pt status improved, states pain has reduced slightly. Refer to flowsheet and chart, pt safety maintained, pt hemodynamically stable, updated on plan of care. Awaiting dispo. Call light provided, fall safety reinforced. Will continue to monitor.
Pt sleeping in stretcher in no apparent signs of distress. Pt remains on cardiac monitor, PIV site WNL. Pt status unchanged, refer to flowsheet and chart, pt safety maintained, pt hemodynamically stable, updated on plan of care. Awaiting dispo. Call light provided, fall safety reinforced. Will continue to monitor
pt  AOX4 in no apparent distress, c/o pain 5/10 to abdomen, states its better than when she arrived at ED. IV patent and flushing without difficulty, no signs of infiltration.

## 2019-09-22 NOTE — CONSULT NOTE ADULT - SUBJECTIVE AND OBJECTIVE BOX
HPI:  58 y/o female with h/o Ascites, ESRD on HD, DM II, paroxysmal A Fib on no anticoagulation for h/o GI bleed, chronic CHF, non obstructing CAD, pericarditis s/p pericardiocentesis, came to the ER because of vague upper abdominal pain and vomiting for more than 1 week. no hematemesis or blood in the stools. no fever but chills. o/e: diffuse abdominal tenderness with purple discoloration of umbilicus CT scan showed large ascites.       PAST MEDICAL & SURGICAL HISTORY:  Coronary artery disease, angina presence unspecified, unspecified vessel or lesion type, unspecified whether native or transplanted heart  Paroxysmal atrial fibrillation  Anemia due to chronic kidney disease, unspecified CKD stage  Chronic systolic congestive heart failure  Pleural effusion  Pericardial effusion  End stage renal disease on dialysis  HLD (hyperlipidemia)  HTN (hypertension)  DM (diabetes mellitus)  A-V fistula  Encounter for dialysis catheter care      FAMILY HISTORY:  Family history of kidney disease in brother (Sibling)  [place]    Social History:Non smoker    MEDICATIONS  (STANDING):  amLODIPine   Tablet 2.5 milliGRAM(s) Oral daily  aspirin enteric coated 81 milliGRAM(s) Oral daily  calcium carbonate   1250 mG (OsCal) 1 Tablet(s) Oral three times a day  carvedilol 12.5 milliGRAM(s) Oral every 12 hours  cefepime   IVPB      dextrose 5%. 1000 milliLiter(s) (50 mL/Hr) IV Continuous <Continuous>  dextrose 50% Injectable 12.5 Gram(s) IV Push once  dextrose 50% Injectable 25 Gram(s) IV Push once  dextrose 50% Injectable 25 Gram(s) IV Push once  insulin glargine Injectable (LANTUS) 4 Unit(s) SubCutaneous every morning  insulin lispro (HumaLOG) corrective regimen sliding scale   SubCutaneous three times a day before meals  pantoprazole  Injectable 40 milliGRAM(s) IV Push two times a day  sacubitril 49 mG/valsartan 51 mG 1 Tablet(s) Oral <User Schedule>    MEDICATIONS  (PRN):  acetaminophen   Tablet .. 650 milliGRAM(s) Oral every 6 hours PRN Temp greater or equal to 38C (100.4F), Mild Pain (1 - 3)  dextrose 40% Gel 15 Gram(s) Oral once PRN Blood Glucose LESS THAN 70 milliGRAM(s)/deciliter  glucagon  Injectable 1 milliGRAM(s) IntraMuscular once PRN Glucose LESS THAN 70 milligrams/deciliter  morphine  - Injectable 2 milliGRAM(s) IV Push every 6 hours PRN Moderate Pain (4 - 6)   Meds reviewed    Vital Signs Last 24 Hrs  T(C): 36.7 (22 Sep 2019 07:30), Max: 37 (21 Sep 2019 18:20)  T(F): 98 (22 Sep 2019 07:30), Max: 98.6 (21 Sep 2019 18:20)  HR: 68 (22 Sep 2019 07:30) (68 - 76)  BP: 142/77 (22 Sep 2019 07:30) (142/77 - 174/90)  BP(mean): --  RR: 18 (22 Sep 2019 07:30) (18 - 18)  SpO2: 95% (22 Sep 2019 07:30) (95% - 99%)  Daily Height in cm: 165.1 (21 Sep 2019 18:20)    Daily     PHYSICAL EXAM:  GENERAL: appears chronically ill  HEAD:  Atraumatic, Normocephalic  EYES: EOMI  NECK: Supple, neck  veins full  NERVOUS SYSTEM:  Alert & Oriented X3  CHEST/LUNG: Clear to percussion bilaterally; No rales  HEART: Regular rate and rhythm; No murmurs  ABDOMEN: Soft, Nontender, distended + ascites      LABS:                        11.6   4.29  )-----------( 251      ( 21 Sep 2019 20:23 )             37.6     09-21    131<L>  |  88<L>  |  26.0<H>  ----------------------------<  309<H>  5.0   |  27.0  |  3.79<H>    Ca    9.7      21 Sep 2019 20:23    TPro  8.4  /  Alb  3.8  /  TBili  0.6  /  DBili  x   /  AST  24  /  ALT  9   /  AlkPhos  114  09-21    PT/INR - ( 21 Sep 2019 21:09 )   PT: 13.1 sec;   INR: 1.13 ratio         PTT - ( 21 Sep 2019 21:09 )  PTT:32.0 sec            RADIOLOGY & ADDITIONAL TESTS:

## 2019-09-23 ENCOUNTER — RESULT REVIEW (OUTPATIENT)
Age: 59
End: 2019-09-23

## 2019-09-23 LAB
ALBUMIN SERPL ELPH-MCNC: 3.3 G/DL — SIGNIFICANT CHANGE UP (ref 3.3–5.2)
ALP SERPL-CCNC: 96 U/L — SIGNIFICANT CHANGE UP (ref 40–120)
ALT FLD-CCNC: 6 U/L — SIGNIFICANT CHANGE UP
ANION GAP SERPL CALC-SCNC: 16 MMOL/L — SIGNIFICANT CHANGE UP (ref 5–17)
AST SERPL-CCNC: 13 U/L — SIGNIFICANT CHANGE UP
B PERT IGG+IGM PNL SER: ABNORMAL
BASOPHILS # BLD AUTO: 0.03 K/UL — SIGNIFICANT CHANGE UP (ref 0–0.2)
BASOPHILS NFR BLD AUTO: 0.8 % — SIGNIFICANT CHANGE UP (ref 0–2)
BILIRUB SERPL-MCNC: 0.6 MG/DL — SIGNIFICANT CHANGE UP (ref 0.4–2)
BUN SERPL-MCNC: 45 MG/DL — HIGH (ref 8–20)
CALCIUM SERPL-MCNC: 8.9 MG/DL — SIGNIFICANT CHANGE UP (ref 8.6–10.2)
CHLORIDE SERPL-SCNC: 91 MMOL/L — LOW (ref 98–107)
CO2 SERPL-SCNC: 26 MMOL/L — SIGNIFICANT CHANGE UP (ref 22–29)
COLOR FLD: YELLOW
CREAT SERPL-MCNC: 5.51 MG/DL — HIGH (ref 0.5–1.3)
EOSINOPHIL # BLD AUTO: 0.09 K/UL — SIGNIFICANT CHANGE UP (ref 0–0.5)
EOSINOPHIL NFR BLD AUTO: 2.3 % — SIGNIFICANT CHANGE UP (ref 0–6)
FLUID INTAKE SUBSTANCE CLASS: SIGNIFICANT CHANGE UP
FLUID SEGMENTED GRANULOCYTES: 10 % — SIGNIFICANT CHANGE UP
GLUCOSE BLDC GLUCOMTR-MCNC: 126 MG/DL — HIGH (ref 70–99)
GLUCOSE BLDC GLUCOMTR-MCNC: 144 MG/DL — HIGH (ref 70–99)
GLUCOSE BLDC GLUCOMTR-MCNC: 149 MG/DL — HIGH (ref 70–99)
GLUCOSE BLDC GLUCOMTR-MCNC: 189 MG/DL — HIGH (ref 70–99)
GLUCOSE SERPL-MCNC: 186 MG/DL — HIGH (ref 70–115)
GRAM STN FLD: SIGNIFICANT CHANGE UP
HBA1C BLD-MCNC: 7 % — HIGH (ref 4–5.6)
HCT VFR BLD CALC: 34.1 % — LOW (ref 34.5–45)
HGB BLD-MCNC: 10.5 G/DL — LOW (ref 11.5–15.5)
IMM GRANULOCYTES NFR BLD AUTO: 0.5 % — SIGNIFICANT CHANGE UP (ref 0–1.5)
LDH SERPL L TO P-CCNC: 145 U/L — SIGNIFICANT CHANGE UP (ref 98–192)
LYMPHOCYTES # BLD AUTO: 0.56 K/UL — LOW (ref 1–3.3)
LYMPHOCYTES # BLD AUTO: 14 % — SIGNIFICANT CHANGE UP (ref 13–44)
LYMPHOCYTES # FLD: 25 % — SIGNIFICANT CHANGE UP
MCHC RBC-ENTMCNC: 27.9 PG — SIGNIFICANT CHANGE UP (ref 27–34)
MCHC RBC-ENTMCNC: 30.8 GM/DL — LOW (ref 32–36)
MCV RBC AUTO: 90.7 FL — SIGNIFICANT CHANGE UP (ref 80–100)
MESOTHL CELL # FLD: 30 % — SIGNIFICANT CHANGE UP
MONOCYTES # BLD AUTO: 0.42 K/UL — SIGNIFICANT CHANGE UP (ref 0–0.9)
MONOCYTES NFR BLD AUTO: 10.5 % — SIGNIFICANT CHANGE UP (ref 2–14)
MONOS+MACROS # FLD: 35 % — SIGNIFICANT CHANGE UP
NEUTROPHILS # BLD AUTO: 2.87 K/UL — SIGNIFICANT CHANGE UP (ref 1.8–7.4)
NEUTROPHILS NFR BLD AUTO: 71.9 % — SIGNIFICANT CHANGE UP (ref 43–77)
PLATELET # BLD AUTO: 244 K/UL — SIGNIFICANT CHANGE UP (ref 150–400)
POTASSIUM SERPL-MCNC: 5.6 MMOL/L — HIGH (ref 3.5–5.3)
POTASSIUM SERPL-SCNC: 5.6 MMOL/L — HIGH (ref 3.5–5.3)
PROT SERPL-MCNC: 7.2 G/DL — SIGNIFICANT CHANGE UP (ref 6.6–8.7)
RBC # BLD: 3.76 M/UL — LOW (ref 3.8–5.2)
RBC # FLD: 16.3 % — HIGH (ref 10.3–14.5)
RCV VOL RI: 2550 /UL — HIGH (ref 0–5)
SODIUM SERPL-SCNC: 133 MMOL/L — LOW (ref 135–145)
SPECIMEN SOURCE FLD: SIGNIFICANT CHANGE UP
SPECIMEN SOURCE: SIGNIFICANT CHANGE UP
TOTAL NUCLEATED CELL COUNT, BODY FLUID: 110 /UL — HIGH (ref 0–5)
TUBE TYPE: SIGNIFICANT CHANGE UP
WBC # BLD: 3.99 K/UL — SIGNIFICANT CHANGE UP (ref 3.8–10.5)
WBC # FLD AUTO: 3.99 K/UL — SIGNIFICANT CHANGE UP (ref 3.8–10.5)

## 2019-09-23 PROCEDURE — 99233 SBSQ HOSP IP/OBS HIGH 50: CPT | Mod: GC

## 2019-09-23 PROCEDURE — 88305 TISSUE EXAM BY PATHOLOGIST: CPT | Mod: 26

## 2019-09-23 PROCEDURE — 88112 CYTOPATH CELL ENHANCE TECH: CPT | Mod: 26

## 2019-09-23 RX ORDER — HEPARIN SODIUM 5000 [USP'U]/ML
5000 INJECTION INTRAVENOUS; SUBCUTANEOUS EVERY 12 HOURS
Refills: 0 | Status: DISCONTINUED | OUTPATIENT
Start: 2019-09-24 | End: 2019-09-24

## 2019-09-23 RX ORDER — INSULIN GLARGINE 100 [IU]/ML
5 INJECTION, SOLUTION SUBCUTANEOUS AT BEDTIME
Refills: 0 | Status: DISCONTINUED | OUTPATIENT
Start: 2019-09-24 | End: 2019-09-24

## 2019-09-23 RX ORDER — ONDANSETRON 8 MG/1
4 TABLET, FILM COATED ORAL EVERY 8 HOURS
Refills: 0 | Status: DISCONTINUED | OUTPATIENT
Start: 2019-09-23 | End: 2019-09-24

## 2019-09-23 RX ADMIN — CARVEDILOL PHOSPHATE 25 MILLIGRAM(S): 80 CAPSULE, EXTENDED RELEASE ORAL at 18:40

## 2019-09-23 RX ADMIN — SPIRONOLACTONE 25 MILLIGRAM(S): 25 TABLET, FILM COATED ORAL at 06:11

## 2019-09-23 RX ADMIN — Medication 1 TABLET(S): at 15:41

## 2019-09-23 RX ADMIN — PANTOPRAZOLE SODIUM 40 MILLIGRAM(S): 20 TABLET, DELAYED RELEASE ORAL at 18:40

## 2019-09-23 RX ADMIN — Medication 50 MILLIGRAM(S): at 06:11

## 2019-09-23 RX ADMIN — Medication 1 TABLET(S): at 21:28

## 2019-09-23 RX ADMIN — Medication 1 TABLET(S): at 06:11

## 2019-09-23 RX ADMIN — Medication 50 MILLIGRAM(S): at 18:40

## 2019-09-23 RX ADMIN — Medication 81 MILLIGRAM(S): at 15:40

## 2019-09-23 RX ADMIN — LISINOPRIL 40 MILLIGRAM(S): 2.5 TABLET ORAL at 06:11

## 2019-09-23 RX ADMIN — CEFEPIME 100 MILLIGRAM(S): 1 INJECTION, POWDER, FOR SOLUTION INTRAMUSCULAR; INTRAVENOUS at 15:41

## 2019-09-23 RX ADMIN — ATORVASTATIN CALCIUM 40 MILLIGRAM(S): 80 TABLET, FILM COATED ORAL at 21:28

## 2019-09-23 RX ADMIN — PANTOPRAZOLE SODIUM 40 MILLIGRAM(S): 20 TABLET, DELAYED RELEASE ORAL at 06:11

## 2019-09-23 RX ADMIN — INSULIN GLARGINE 4 UNIT(S): 100 INJECTION, SOLUTION SUBCUTANEOUS at 08:49

## 2019-09-23 RX ADMIN — CARVEDILOL PHOSPHATE 25 MILLIGRAM(S): 80 CAPSULE, EXTENDED RELEASE ORAL at 06:11

## 2019-09-23 RX ADMIN — Medication 250 MILLIGRAM(S): at 06:11

## 2019-09-23 RX ADMIN — Medication 2: at 18:11

## 2019-09-23 NOTE — PROGRESS NOTE ADULT - SUBJECTIVE AND OBJECTIVE BOX
NEPHROLOGY INTERVAL HPI/OVERNIGHT EVENTS:    Examined  Feels better  HD today    MEDICATIONS  (STANDING):  aspirin enteric coated 81 milliGRAM(s) Oral daily  atorvastatin 40 milliGRAM(s) Oral at bedtime  calcium carbonate   1250 mG (OsCal) 1 Tablet(s) Oral three times a day  carvedilol 25 milliGRAM(s) Oral every 12 hours  cefepime   IVPB 1000 milliGRAM(s) IV Intermittent daily  dextrose 50% Injectable 12.5 Gram(s) IV Push once  dextrose 50% Injectable 25 Gram(s) IV Push once  dextrose 50% Injectable 25 Gram(s) IV Push once  hydrALAZINE 50 milliGRAM(s) Oral two times a day  insulin lispro (HumaLOG) corrective regimen sliding scale   SubCutaneous three times a day before meals  lisinopril 40 milliGRAM(s) Oral daily  pantoprazole  Injectable 40 milliGRAM(s) IV Push two times a day  saccharomyces boulardii 250 milliGRAM(s) Oral two times a day    MEDICATIONS  (PRN):  acetaminophen   Tablet .. 650 milliGRAM(s) Oral every 6 hours PRN Temp greater or equal to 38C (100.4F), Mild Pain (1 - 3)  dextrose 40% Gel 15 Gram(s) Oral once PRN Blood Glucose LESS THAN 70 milliGRAM(s)/deciliter  glucagon  Injectable 1 milliGRAM(s) IntraMuscular once PRN Glucose LESS THAN 70 milligrams/deciliter  ondansetron Injectable 4 milliGRAM(s) IV Push every 8 hours PRN Nausea and/or Vomiting      Allergies    No Known Allergies    Intolerances        Vital Signs Last 24 Hrs  T(C): 36.8 (23 Sep 2019 16:18), Max: 37.7 (22 Sep 2019 23:05)  T(F): 98.3 (23 Sep 2019 16:18), Max: 99.9 (22 Sep 2019 23:05)  HR: 68 (23 Sep 2019 16:18) (68 - 979)  BP: 154/72 (23 Sep 2019 16:18) (134/72 - 160/78)  BP(mean): --  RR: 18 (23 Sep 2019 16:18) (18 - 20)  SpO2: 97% (23 Sep 2019 16:18) (94% - 97%)  Daily     Daily Weight in k.8 (23 Sep 2019 10:52)    PHYSICAL EXAM:  GENERAL: appears chronically ill  HEAD:  Atraumatic, Normocephalic  EYES: EOMI  NECK: Supple, neck  veins full  NERVOUS SYSTEM:  Alert & Oriented X3  CHEST/LUNG: Clear to percussion bilaterally; No rales  HEART: Regular rate and rhythm; No murmurs  ABDOMEN: Soft, Nontender, distended + ascites    LABS:                        10.5   3.99  )-----------( 244      ( 23 Sep 2019 08:13 )             34.1         133<L>  |  91<L>  |  45.0<H>  ----------------------------<  186<H>  5.6<H>   |  26.0  |  5.51<H>    Ca    8.9      23 Sep 2019 08:13    TPro  7.2  /  Alb  3.3  /  TBili  0.6  /  DBili  x   /  AST  13  /  ALT  6   /  AlkPhos  96      PT/INR - ( 21 Sep 2019 21:09 )   PT: 13.1 sec;   INR: 1.13 ratio         PTT - ( 21 Sep 2019 21:09 )  PTT:32.0 sec            RADIOLOGY & ADDITIONAL TESTS:

## 2019-09-23 NOTE — PROGRESS NOTE ADULT - SUBJECTIVE AND OBJECTIVE BOX
59y Female,   Patient is a 59y old  Female who presents with a chief complaint of abdominal pain, vomiting (22 Sep 2019 10:01)    INTERVAL/OVERNIGHT EVENTS:    Patient examined at beside.  No acute events overnight.  Patient is S/p Paracentesis, 5.6 L of fluid removed.  Patient states feels breathing, abdominal pain and nausea have improved after paracentesis.  Patient denies chest pain, calf pain, headaches, fever, chills, dysuria, hematuria, diarrhea, constipation, SOB, palpitations.   Rest of ROS not contributory except for above.      I&O's Summary    23 Sep 2019 07:01  -  23 Sep 2019 16:03  --------------------------------------------------------  IN: 0 mL / OUT: 1400 mL / NET: -1400 mL        Daily     Daily Weight in k.8 (23 Sep 2019 10:52)    CAPILLARY BLOOD GLUCOSE  POCT Blood Glucose.: 126 mg/dL (23 Sep 2019 14:15)  POCT Blood Glucose.: 144 mg/dL (23 Sep 2019 08:04)  POCT Blood Glucose.: 363 mg/dL (22 Sep 2019 17:48)      T(C): 36.8 (19 @ 10:52), Max: 37.7 (19 @ 23:05)  HR: 979 (19 @ 14:00) (68 - 979)  BP: 160/78 (19 @ 14:00) (122/73 - 160/78)  RR: 18 (19 @ 14:00) (16 - 20)  SpO2: 97% (19 @ 14:00) (94% - 98%)    PHYSICAL EXAM: Vital signs reviewed.    GENERAL: Not in acute distress. Awake, alert and oriented x 3. Cooperative. Facial pallor noted.   HEENT: Clear conjunctiva b/l, moist oral mucosa.   NECK: Supple.   CARDIOVASCULAR: Regular heart rate and rhythm, S1/S2 present. No rubs/ no murmurs/ no rubs.   RESPIRATORY: Clear to auscultation b/l.   GASTROINTESTINAL: Soft, mild tenderness on palpation of right abdomen. Noted dressing on right side c/i/d. Bowel sounds present in 4 quadrants.   EXTREMITIES: No clubbing, cyanosis or edema. No calf tenderness. Moves 4 extremities spontaneously   SKIN: warm and dry with normal turgor.  NEURO: Alert/oriented x 3/normal motor exam. No focal signs.   PSYCH: normal affect.        LABS                          10.5   3.99  )-----------( 244      ( 23 Sep 2019 08:13 )             34.1             133<L>  |  91<L>  |  45.0<H>  ----------------------------<  186<H>  5.6<H>   |  26.0  |  5.51<H>    Ca    8.9      23 Sep 2019 08:13    TPro  7.2  /  Alb  3.3  /  TBili  0.6  /  DBili  x   /  AST  13  /  ALT  6   /  AlkPhos  96        LIVER FUNCTIONS - ( 23 Sep 2019 08:13 )  Alb: 3.3 g/dL / Pro: 7.2 g/dL / ALK PHOS: 96 U/L / ALT: 6 U/L / AST: 13 U/L / GGT: x             PT/INR - ( 21 Sep 2019 21:09 )   PT: 13.1 sec;   INR: 1.13 ratio         PTT - ( 21 Sep 2019 21:09 )  PTT:32.0 sec      Culture - Body Fluid with Gram Stain (collected 23 Sep 2019 13:44)  Source: .Body Fluid  Gram Stain (23 Sep 2019 15:26):    No White blood cells    No organisms seen    Cell Count, Body Fluid (19 @ 13:50)    Mesothelial Cells - Body Fluid: 30: REACTIVE MESOTHELIAL CELLS ALSO SEEN TOO. %    Monocyte/Macrophage Count - Body Fluid: 35 %    Fluid Segmented Granulocytes: 10 %    Fluid Source: Peritoneal    Fluid Type: Peritoneal fl    Body Fluid Appearance: Slightly Cloudy    BF Lymphocytes: 25 %    Tube Type: Lavender    Color - Body Fluid: Yellow    Total Nucleated Cell Count, Body Fluid: 110 /uL    Total RBC Count: 2550 /uL      MEDICATIONS  (STANDING):  aspirin enteric coated 81 milliGRAM(s) Oral daily  atorvastatin 40 milliGRAM(s) Oral at bedtime  calcium carbonate   1250 mG (OsCal) 1 Tablet(s) Oral three times a day  carvedilol 25 milliGRAM(s) Oral every 12 hours  cefepime   IVPB 1000 milliGRAM(s) IV Intermittent daily  dextrose 50% Injectable 12.5 Gram(s) IV Push once  dextrose 50% Injectable 25 Gram(s) IV Push once  dextrose 50% Injectable 25 Gram(s) IV Push once  hydrALAZINE 50 milliGRAM(s) Oral two times a day  insulin lispro (HumaLOG) corrective regimen sliding scale   SubCutaneous three times a day before meals  lisinopril 40 milliGRAM(s) Oral daily  pantoprazole  Injectable 40 milliGRAM(s) IV Push two times a day  saccharomyces boulardii 250 milliGRAM(s) Oral two times a day    MEDICATIONS  (PRN):  acetaminophen   Tablet .. 650 milliGRAM(s) Oral every 6 hours PRN Temp greater or equal to 38C (100.4F), Mild Pain (1 - 3)  dextrose 40% Gel 15 Gram(s) Oral once PRN Blood Glucose LESS THAN 70 milliGRAM(s)/deciliter  glucagon  Injectable 1 milliGRAM(s) IntraMuscular once PRN Glucose LESS THAN 70 milligrams/deciliter  ondansetron Injectable 4 milliGRAM(s) IV Push every 8 hours PRN Nausea and/or Vomiting 59y Female,   Patient is a 59y old  Female who presents with a chief complaint of abdominal pain, vomiting (22 Sep 2019 10:01) with improvement in pain and sx post paracentesis     INTERVAL/OVERNIGHT EVENTS:    Patient examined at beside.  No acute events overnight.  Patient is S/p Paracentesis, 5.6 L of fluid removed.  Patient states feels breathing, abdominal pain and nausea have improved after paracentesis.  Patient denies chest pain, calf pain, headaches, fever, chills, dysuria, hematuria, diarrhea, constipation, SOB, palpitations.   Rest of ROS not contributory except for above.      I&O's Summary    23 Sep 2019 07:01  -  23 Sep 2019 16:03  --------------------------------------------------------  IN: 0 mL / OUT: 1400 mL / NET: -1400 mL        Daily     Daily Weight in k.8 (23 Sep 2019 10:52)    CAPILLARY BLOOD GLUCOSE  POCT Blood Glucose.: 126 mg/dL (23 Sep 2019 14:15)  POCT Blood Glucose.: 144 mg/dL (23 Sep 2019 08:04)  POCT Blood Glucose.: 363 mg/dL (22 Sep 2019 17:48)      T(C): 36.8 (19 @ 10:52), Max: 37.7 (19 @ 23:05)  HR: 979 (19 @ 14:00) (68 - 979)  BP: 160/78 (19 @ 14:00) (122/73 - 160/78)  RR: 18 (19 @ 14:00) (16 - 20)  SpO2: 97% (19 @ 14:00) (94% - 98%)    PHYSICAL EXAM: Vital signs reviewed.    GENERAL: Not in acute distress. Awake, alert and oriented x 3. Cooperative. Facial pallor noted.   HEENT: Clear conjunctiva b/l, moist oral mucosa.   NECK: Supple.   CARDIOVASCULAR: Regular heart rate and rhythm, S1/S2 present. No rubs/ no murmurs/ no rubs.   RESPIRATORY: Clear to auscultation b/l.   GASTROINTESTINAL: Soft, mild tenderness on palpation of right abdomen. Noted dressing on right side c/i/d. Bowel sounds present in 4 quadrants.   EXTREMITIES: No clubbing, cyanosis or edema. No calf tenderness. Moves 4 extremities spontaneously   SKIN: warm and dry with normal turgor.  NEURO: Alert/oriented x 3/normal motor exam. No focal signs.   PSYCH: normal affect.        LABS                          10.5   3.99  )-----------( 244      ( 23 Sep 2019 08:13 )             34.1             133<L>  |  91<L>  |  45.0<H>  ----------------------------<  186<H>  5.6<H>   |  26.0  |  5.51<H>    Ca    8.9      23 Sep 2019 08:13    TPro  7.2  /  Alb  3.3  /  TBili  0.6  /  DBili  x   /  AST  13  /  ALT  6   /  AlkPhos  96        LIVER FUNCTIONS - ( 23 Sep 2019 08:13 )  Alb: 3.3 g/dL / Pro: 7.2 g/dL / ALK PHOS: 96 U/L / ALT: 6 U/L / AST: 13 U/L / GGT: x             PT/INR - ( 21 Sep 2019 21:09 )   PT: 13.1 sec;   INR: 1.13 ratio         PTT - ( 21 Sep 2019 21:09 )  PTT:32.0 sec      Culture - Body Fluid with Gram Stain (collected 23 Sep 2019 13:44)  Source: .Body Fluid  Gram Stain (23 Sep 2019 15:26):    No White blood cells    No organisms seen    Cell Count, Body Fluid (19 @ 13:50)    Mesothelial Cells - Body Fluid: 30: REACTIVE MESOTHELIAL CELLS ALSO SEEN TOO. %    Monocyte/Macrophage Count - Body Fluid: 35 %    Fluid Segmented Granulocytes: 10 %    Fluid Source: Peritoneal    Fluid Type: Peritoneal fl    Body Fluid Appearance: Slightly Cloudy    BF Lymphocytes: 25 %    Tube Type: Lavender    Color - Body Fluid: Yellow    Total Nucleated Cell Count, Body Fluid: 110 /uL    Total RBC Count: 2550 /uL      MEDICATIONS  (STANDING):  aspirin enteric coated 81 milliGRAM(s) Oral daily  atorvastatin 40 milliGRAM(s) Oral at bedtime  calcium carbonate   1250 mG (OsCal) 1 Tablet(s) Oral three times a day  carvedilol 25 milliGRAM(s) Oral every 12 hours  cefepime   IVPB 1000 milliGRAM(s) IV Intermittent daily  dextrose 50% Injectable 12.5 Gram(s) IV Push once  dextrose 50% Injectable 25 Gram(s) IV Push once  dextrose 50% Injectable 25 Gram(s) IV Push once  hydrALAZINE 50 milliGRAM(s) Oral two times a day  insulin lispro (HumaLOG) corrective regimen sliding scale   SubCutaneous three times a day before meals  lisinopril 40 milliGRAM(s) Oral daily  pantoprazole  Injectable 40 milliGRAM(s) IV Push two times a day  saccharomyces boulardii 250 milliGRAM(s) Oral two times a day    MEDICATIONS  (PRN):  acetaminophen   Tablet .. 650 milliGRAM(s) Oral every 6 hours PRN Temp greater or equal to 38C (100.4F), Mild Pain (1 - 3)  dextrose 40% Gel 15 Gram(s) Oral once PRN Blood Glucose LESS THAN 70 milliGRAM(s)/deciliter  glucagon  Injectable 1 milliGRAM(s) IntraMuscular once PRN Glucose LESS THAN 70 milligrams/deciliter  ondansetron Injectable 4 milliGRAM(s) IV Push every 8 hours PRN Nausea and/or Vomiting

## 2019-09-23 NOTE — PROGRESS NOTE ADULT - ASSESSMENT
ESRd HD today on  MWF schedule   Consent in chart    Abdominal pain + ascites paracentesis? r/o SBP    Anemia no need for MARGI at this time    HTN BP not ideal will reassess after UF removal on HD    will follow

## 2019-09-23 NOTE — PROGRESS NOTE ADULT - ASSESSMENT
58 yo female with PMH of ESRD on HD MWF, chronic HFrEF (EF 35-40% TTE 02/11/19), NICM (cardiac cath 03/18 non-obstructive CAD), paroxysmal Afib (no AC due to GI bleed), uremic pericarditis s/p pericardiocentesis, right sided pleural effusion s/p pigtail catheter, IDDM, and HTN, who presented to ED with complain of diffuse abdominal pain, nausea and vomiting for 2 weeks. CT abdomen done on admission showed large volume ascites. Patient is now s/p paracentesis with 5.6L of fluid was drained, with <250 WBC. Pending cytopathology. Patient has been admitted with similar clinica scenario in the past, and has benefited from serial HD for volume off- loading Nephrology following the case.     Abdominal pain secondary to Large volume ascites in the context of  setting ESRD/ CHF.  - Abdominal pain, nausea, vomiting improving after paracentesis and dialysis.  - Initial concern for SBP, cell count showing <250 PMC, not consistent with SBP, will d/c Cefepime.   - Cytopathology pending.  - LFT wnl, CT abdomen showing no liver mass.  - Zofran PRN nausea/ vomiting.     Chronic systolic HFrEF: 35-40%   - EF on  02/2019 35- 40% - - Non obstructive CAD by cath in 3/2018   - c/w Strict I/o   -Fluid restriction - daily weight   - c/w home dose of coreg and lisinopril.  - C/w atorvastatin, aspirin.   - c/w offloading fluid with HD  - Non obstructive CAD by cath in 3/2018     ESRD on HD   - will monitor electrolytes closely   - c/w HD as per Nephro, probable HD tomorrow.  - Renal restriction diet.     T2DM   - Last HbA1C: 7  - C/w Lantus 5 at bedtime and Mod dose SS.   - c/w hypoglycemia protocol    HTN  - bp stable   -c/w Coreg, lisinopril and hydralazine.     Anemia in ESRD  - h/h stable    Prophylactic measure:  - Will start Heparin 5000 units SQ q12 h  - Protonix 40 mg QD    Advanced Directive: Full code   Next of kin: Garrick Wiggins     Disposition:  LOS 2-3 days pending improvement on abdominal pain and off-loading with HD. 58 yo female with hx of ESRD on HD MWF, chronic diastolic  HFrEF (EF 35-40% TTE 02/11/19), NICM (cardiac cath 03/18 non-obstructive CAD), paroxysmal Afib (no AC due to GI bleed), uremic pericarditis s/p pericardiocentesis, right sided pleural effusion s/p pigtail catheter and removal, IDDM, and HTN, who presented to ED with diffuse abdominal pain, nausea and vomiting for 2 weeks. CT abdomen pelvis completed and showed large volume ascites. Pt admitted witn intractable abd pain with associated N/V secondary to large amount of ascites. Clinical concern for possible SBP. IR consulted and pt s/p paracentesis with 5.6L of fluid was drained, with <250 WBC. Pending cytopathology. Fluid cx negative for SBP. Pt has recurrent hospitalizations for fluid overload in the setting of ESRD/ acute on chronic diastolic HF requiring multiple HD sessions to off load fluid. Will plan to dialyze today and tomorrow. Slow clinical improvement.     Abdominal pain secondary to Large volume ascites/fluid overload in the setting ESRD/ acute on chronic diastolic HF  - Abdominal pain, nausea, vomiting improving after paracentesis and dialysis.  - s/p removal 5.6 l   - Initial concern for SBP, cell count showing <250 PMC, not consistent with SBP, will d/c Cefepime.   - Cytopathology pending.  - LFT wnl, CT abdomen showing no liver mass.  - Zofran PRN nausea/ vomiting  - strict I/O   - daily weight   - c/w offloading fluid with HD will perform serial HD for today, tomorrow and to resume regular HD session on wed   - nephro f/u noted and appreciated     Acute on Chronic systolic HFrEF: 35-40%   - EF on  02/2019 35- 40% - - Non obstructive CAD by cath in 3/2018   - c/w Strict I/o   -Fluid restriction - daily weight   - c/w home dose of coreg and lisinopril.  - C/w atorvastatin, aspirin.   - c/w offloading fluid with HD    ESRD on HD   - will monitor electrolytes closely   - c/w HD to offload fluid  - Renal restriction diet  - nephro f/u noted and appreciated, d/w Dr. Aguilar the plan of care    T2DM   - Last HbA1C: 7  - slihgtly elevated fs   - C/w Lantus 5 at bedtime and Mod dose SS.   - c/w hypoglycemia protocol    HTN  - bp stable   -c/w Coreg, lisinopril and hydralazine.     Anemia in ESRD  - h/h stable    Prophylactic measure:  - Will start Heparin 5000 units SQ q12 h  - Protonix 40 mg QD    Advanced Directive: Full code   Next of kin: Garrick Wiggins     Disposition:  LOS 2-3 days pending improvement on abdominal pain and off-loading with HD.

## 2019-09-24 ENCOUNTER — TRANSCRIPTION ENCOUNTER (OUTPATIENT)
Age: 59
End: 2019-09-24

## 2019-09-24 VITALS
SYSTOLIC BLOOD PRESSURE: 169 MMHG | HEART RATE: 66 BPM | RESPIRATION RATE: 16 BRPM | DIASTOLIC BLOOD PRESSURE: 81 MMHG | OXYGEN SATURATION: 99 % | TEMPERATURE: 98 F

## 2019-09-24 LAB
ALBUMIN FLD-MCNC: 2.8 G/DL — SIGNIFICANT CHANGE UP
ANION GAP SERPL CALC-SCNC: 17 MMOL/L — SIGNIFICANT CHANGE UP (ref 5–17)
BUN SERPL-MCNC: 37 MG/DL — HIGH (ref 8–20)
CALCIUM SERPL-MCNC: 9 MG/DL — SIGNIFICANT CHANGE UP (ref 8.6–10.2)
CHLORIDE SERPL-SCNC: 91 MMOL/L — LOW (ref 98–107)
CO2 SERPL-SCNC: 25 MMOL/L — SIGNIFICANT CHANGE UP (ref 22–29)
CREAT SERPL-MCNC: 4.7 MG/DL — HIGH (ref 0.5–1.3)
GLUCOSE BLDC GLUCOMTR-MCNC: 127 MG/DL — HIGH (ref 70–99)
GLUCOSE BLDC GLUCOMTR-MCNC: 163 MG/DL — HIGH (ref 70–99)
GLUCOSE BLDC GLUCOMTR-MCNC: 172 MG/DL — HIGH (ref 70–99)
GLUCOSE FLD-MCNC: 148 MG/DL — SIGNIFICANT CHANGE UP
GLUCOSE SERPL-MCNC: 197 MG/DL — HIGH (ref 70–115)
LDH SERPL L TO P-CCNC: 111 U/L — SIGNIFICANT CHANGE UP
MAGNESIUM SERPL-MCNC: 2.2 MG/DL — SIGNIFICANT CHANGE UP (ref 1.6–2.6)
NIGHT BLUE STAIN TISS: SIGNIFICANT CHANGE UP
PHOSPHATE SERPL-MCNC: 4 MG/DL — SIGNIFICANT CHANGE UP (ref 2.4–4.7)
POTASSIUM SERPL-MCNC: 5.1 MMOL/L — SIGNIFICANT CHANGE UP (ref 3.5–5.3)
POTASSIUM SERPL-SCNC: 5.1 MMOL/L — SIGNIFICANT CHANGE UP (ref 3.5–5.3)
PROT FLD-MCNC: 5.4 G/DL — SIGNIFICANT CHANGE UP
SODIUM SERPL-SCNC: 133 MMOL/L — LOW (ref 135–145)
SPECIMEN SOURCE: SIGNIFICANT CHANGE UP

## 2019-09-24 PROCEDURE — 99239 HOSP IP/OBS DSCHRG MGMT >30: CPT

## 2019-09-24 RX ORDER — CARVEDILOL PHOSPHATE 80 MG/1
0.5 CAPSULE, EXTENDED RELEASE ORAL
Qty: 0 | Refills: 0 | DISCHARGE
Start: 2019-09-24 | End: 2019-10-23

## 2019-09-24 RX ORDER — SENNA PLUS 8.6 MG/1
2 TABLET ORAL
Qty: 60 | Refills: 0
Start: 2019-09-24 | End: 2019-10-23

## 2019-09-24 RX ORDER — FERROUS SULFATE 325(65) MG
1 TABLET ORAL
Qty: 30 | Refills: 0
Start: 2019-09-24 | End: 2019-10-23

## 2019-09-24 RX ORDER — SACUBITRIL AND VALSARTAN 24; 26 MG/1; MG/1
1 TABLET, FILM COATED ORAL
Qty: 0 | Refills: 0 | DISCHARGE

## 2019-09-24 RX ORDER — ASPIRIN/CALCIUM CARB/MAGNESIUM 324 MG
1 TABLET ORAL
Qty: 30 | Refills: 0
Start: 2019-09-24 | End: 2019-10-23

## 2019-09-24 RX ORDER — ASCORBIC ACID 60 MG
1 TABLET,CHEWABLE ORAL
Qty: 30 | Refills: 0
Start: 2019-09-24 | End: 2019-10-23

## 2019-09-24 RX ORDER — ATORVASTATIN CALCIUM 80 MG/1
1 TABLET, FILM COATED ORAL
Qty: 30 | Refills: 0
Start: 2019-09-24 | End: 2019-10-23

## 2019-09-24 RX ORDER — HYDRALAZINE HCL 50 MG
75 TABLET ORAL THREE TIMES A DAY
Refills: 0 | Status: DISCONTINUED | OUTPATIENT
Start: 2019-09-24 | End: 2019-09-24

## 2019-09-24 RX ORDER — CALCIUM CARBONATE 500(1250)
1 TABLET ORAL
Qty: 90 | Refills: 0
Start: 2019-09-24 | End: 2019-10-23

## 2019-09-24 RX ORDER — AMLODIPINE BESYLATE 2.5 MG/1
1 TABLET ORAL
Qty: 0 | Refills: 0 | DISCHARGE

## 2019-09-24 RX ORDER — PANTOPRAZOLE SODIUM 20 MG/1
1 TABLET, DELAYED RELEASE ORAL
Qty: 30 | Refills: 0
Start: 2019-09-24 | End: 2019-10-23

## 2019-09-24 RX ORDER — AMLODIPINE BESYLATE 2.5 MG/1
1 TABLET ORAL
Qty: 30 | Refills: 0
Start: 2019-09-24 | End: 2019-10-23

## 2019-09-24 RX ORDER — LISINOPRIL 2.5 MG/1
1 TABLET ORAL
Qty: 30 | Refills: 0
Start: 2019-09-24 | End: 2019-10-23

## 2019-09-24 RX ORDER — HYDRALAZINE HCL 50 MG
75 TABLET ORAL
Refills: 0 | Status: DISCONTINUED | OUTPATIENT
Start: 2019-09-24 | End: 2019-09-24

## 2019-09-24 RX ORDER — HYDRALAZINE HCL 50 MG
1 TABLET ORAL
Qty: 60 | Refills: 0
Start: 2019-09-24 | End: 2019-10-23

## 2019-09-24 RX ORDER — HYDRALAZINE HCL 50 MG
3 TABLET ORAL
Qty: 270 | Refills: 0
Start: 2019-09-24 | End: 2019-10-23

## 2019-09-24 RX ORDER — CARVEDILOL PHOSPHATE 80 MG/1
1 CAPSULE, EXTENDED RELEASE ORAL
Qty: 60 | Refills: 0
Start: 2019-09-24 | End: 2019-10-23

## 2019-09-24 RX ORDER — DOCUSATE SODIUM 100 MG
1 CAPSULE ORAL
Qty: 60 | Refills: 0
Start: 2019-09-24 | End: 2019-10-23

## 2019-09-24 RX ORDER — INSULIN LISPRO 100/ML
2 VIAL (ML) SUBCUTANEOUS
Qty: 0 | Refills: 0 | DISCHARGE

## 2019-09-24 RX ORDER — PANTOPRAZOLE SODIUM 20 MG/1
1 TABLET, DELAYED RELEASE ORAL
Qty: 0 | Refills: 0 | DISCHARGE

## 2019-09-24 RX ADMIN — CARVEDILOL PHOSPHATE 25 MILLIGRAM(S): 80 CAPSULE, EXTENDED RELEASE ORAL at 17:15

## 2019-09-24 RX ADMIN — Medication 2: at 08:54

## 2019-09-24 RX ADMIN — CARVEDILOL PHOSPHATE 25 MILLIGRAM(S): 80 CAPSULE, EXTENDED RELEASE ORAL at 05:18

## 2019-09-24 RX ADMIN — Medication 650 MILLIGRAM(S): at 09:24

## 2019-09-24 RX ADMIN — Medication 50 MILLIGRAM(S): at 05:18

## 2019-09-24 RX ADMIN — PANTOPRAZOLE SODIUM 40 MILLIGRAM(S): 20 TABLET, DELAYED RELEASE ORAL at 17:16

## 2019-09-24 RX ADMIN — Medication 81 MILLIGRAM(S): at 13:00

## 2019-09-24 RX ADMIN — PANTOPRAZOLE SODIUM 40 MILLIGRAM(S): 20 TABLET, DELAYED RELEASE ORAL at 05:18

## 2019-09-24 RX ADMIN — Medication 1 TABLET(S): at 05:18

## 2019-09-24 RX ADMIN — Medication 2: at 13:00

## 2019-09-24 RX ADMIN — Medication 1 TABLET(S): at 13:01

## 2019-09-24 RX ADMIN — Medication 75 MILLIGRAM(S): at 17:23

## 2019-09-24 RX ADMIN — LISINOPRIL 40 MILLIGRAM(S): 2.5 TABLET ORAL at 05:18

## 2019-09-24 RX ADMIN — HEPARIN SODIUM 5000 UNIT(S): 5000 INJECTION INTRAVENOUS; SUBCUTANEOUS at 05:18

## 2019-09-24 NOTE — PROVIDER CONTACT NOTE (OTHER) - SITUATION
pt admitted with Abdominal pain. s/p paracentesis. Pt BP elevated despite her getting morning BP meds. pt denies SOB, headache. pt has blurred vision, however reports that vision change is not new.

## 2019-09-24 NOTE — DISCHARGE NOTE PROVIDER - NSDCFUADDAPPT_GEN_ALL_CORE_FT
-Follow up with your Primary Care Doctor in 1-3 days. -Follow up with your Primary Care Doctor in  3-5 days. Please continue with regularly scheduled hemodialysis. Please follow up with the nephrologist in 1-2 weeks.

## 2019-09-24 NOTE — PROGRESS NOTE ADULT - SUBJECTIVE AND OBJECTIVE BOX
·	Subjective and Objective:   59y Female,   Patient is a 59y old  Female who presents with a chief complaint of abdominal pain, vomiting (22 Sep 2019 10:01) with improvement in pain and sx post paracentesis     INTERVAL/OVERNIGHT EVENTS:    Patient examined at beside.  No acute events overnight. Pt has no complaints.  Patient is S/p Paracentesis, 5.6 L of fluid removed.  Patient states feels breathing, abdominal pain and nausea have improved after paracentesis.  Patient denies chest pain, calf pain, headaches, fever, chills, dysuria, hematuria, diarrhea, constipation, SOB, palpitations.   Rest of ROS not contributory except for above.    I&O's Summary    23 Sep 2019 07:01  -  24 Sep 2019 07:00  --------------------------------------------------------  IN: 0 mL / OUT: 1400 mL / NET: -1400 mL      Daily     Daily Weight in k.8 (23 Sep 2019 10:52)    CAPILLARY BLOOD GLUCOSE  POCT  Blood Glucose (19 @ 21:26)    POCT Blood Glucose.: 149  POCT  Blood Glucose (19 @ 17:13)    POCT Blood Glucose.: 189    Vital Signs Last 24 Hrs  T(C): 37 (24 Sep 2019 07:48), Max: 37.2 (23 Sep 2019 23:35)  T(F): 98.6 (24 Sep 2019 07:48), Max: 98.9 (23 Sep 2019 23:35)  HR: 66 (24 Sep 2019 07:48) (66 - 979)  BP: 172/80 (24 Sep 2019 08:11) (141/81 - 172/80)  RR: 18 (24 Sep 2019 07:48) (18 - 18)  SpO2: 98% (24 Sep 2019 07:48) (96% - 98%)    PHYSICAL EXAM  GENERAL: Not in acute distress. Awake, alert and oriented x 3. Cooperative. Facial pallor noted.   HEENT: Clear conjunctiva b/l, moist oral mucosa.   NECK: Supple.   CARDIOVASCULAR: Regular heart rate and rhythm, S1/S2 present. No rubs/ no murmurs/ no rubs.   RESPIRATORY: Clear to auscultation b/l.   GASTROINTESTINAL: Soft, mild tenderness on palpation of right abdomen. Noted dressing on right side c/i/d. Bowel sounds present in 4 quadrants.   EXTREMITIES: No clubbing, cyanosis or edema. No calf tenderness. Moves 4 extremities spontaneously   SKIN: warm and dry with normal turgor.  NEURO: Alert/oriented x 3/normal motor exam. No focal signs.   PSYCH: normal affect.        LABS                          10.5   3.99  )-----------( 244      ( 23 Sep 2019 08:13 )             34.1             133<L>  |  91<L>  |  45.0<H>  ----------------------------<  186<H>  5.6<H>   |  26.0  |  5.51<H>    Ca    8.9      23 Sep 2019 08:13    TPro  7.2  /  Alb  3.3  /  TBili  0.6  /  DBili  x   /  AST  13  /  ALT  6   /  AlkPhos  96        LIVER FUNCTIONS - ( 23 Sep 2019 08:13 )  Alb: 3.3 g/dL / Pro: 7.2 g/dL / ALK PHOS: 96 U/L / ALT: 6 U/L / AST: 13 U/L / GGT: x             PT/INR - ( 21 Sep 2019 21:09 )   PT: 13.1 sec;   INR: 1.13 ratio         PTT - ( 21 Sep 2019 21:09 )  PTT:32.0 sec      Culture - Body Fluid with Gram Stain (collected 23 Sep 2019 13:44)  Source: .Body Fluid  Gram Stain (23 Sep 2019 15:26):    No White blood cells    No organisms seen    Cell Count, Body Fluid (19 @ 13:50)    Mesothelial Cells - Body Fluid: 30: REACTIVE MESOTHELIAL CELLS ALSO SEEN TOO. %    Monocyte/Macrophage Count - Body Fluid: 35 %    Fluid Segmented Granulocytes: 10 %    Fluid Source: Peritoneal    Fluid Type: Peritoneal fl    Body Fluid Appearance: Slightly Cloudy    BF Lymphocytes: 25 %    Tube Type: Lavender    Color - Body Fluid: Yellow    Total Nucleated Cell Count, Body Fluid: 110 /uL    Total RBC Count: 2550 /uL      MEDICATIONS  (STANDING):  aspirin enteric coated 81 milliGRAM(s) Oral daily  atorvastatin 40 milliGRAM(s) Oral at bedtime  calcium carbonate   1250 mG (OsCal) 1 Tablet(s) Oral three times a day  carvedilol 25 milliGRAM(s) Oral every 12 hours  cefepime   IVPB 1000 milliGRAM(s) IV Intermittent daily  dextrose 50% Injectable 12.5 Gram(s) IV Push once  dextrose 50% Injectable 25 Gram(s) IV Push once  dextrose 50% Injectable 25 Gram(s) IV Push once  hydrALAZINE 50 milliGRAM(s) Oral two times a day  insulin lispro (HumaLOG) corrective regimen sliding scale   SubCutaneous three times a day before meals  lisinopril 40 milliGRAM(s) Oral daily  pantoprazole  Injectable 40 milliGRAM(s) IV Push two times a day  saccharomyces boulardii 250 milliGRAM(s) Oral two times a day    MEDICATIONS  (PRN):  acetaminophen   Tablet .. 650 milliGRAM(s) Oral every 6 hours PRN Temp greater or equal to 38C (100.4F), Mild Pain (1 - 3)  dextrose 40% Gel 15 Gram(s) Oral once PRN Blood Glucose LESS THAN 70 milliGRAM(s)/deciliter  glucagon  Injectable 1 milliGRAM(s) IntraMuscular once PRN Glucose LESS THAN 70 milligrams/deciliter  ondansetron Injectable 4 milliGRAM(s) IV Push every 8 hours PRN Nausea and/or Vomiting

## 2019-09-24 NOTE — DISCHARGE NOTE PROVIDER - NSDCCPCAREPLAN_GEN_ALL_CORE_FT
PRINCIPAL DISCHARGE DIAGNOSIS  Diagnosis: Abdominal pain  Assessment and Plan of Treatment: You had symptoms of abdominal pain because you had an increased build up of fluid in your abdomen. You had a procedure done (paracentesis) to remove the excess fluid from your abdomen and your symptoms have improved. You also had hemodialysis done to assist in the removal of excess fluid from your body. You were initially give n antibiotic in case you had an infection. The antibiotic was stopped because the tests were negative for an infection.  -Please follow up with your Primary Care Doctor to obtain the results for the analysis of the fluid obtained from your abdomen.      SECONDARY DISCHARGE DIAGNOSES  Diagnosis: Hypertension  Assessment and Plan of Treatment: Continue taking your medications as prescribed. Check your blood pressures regularly and keep a log for your doctor. If you have symptoms of dizziness and/or lightheadedness please check your blood pressure before taking your medications.   The following medications were added to your regimen:  -Lisinopril 40mg once per day.  -Hydralazine 75mg twice per day.       Diagnosis: ESRD on dialysis  Assessment and Plan of Treatment: You received hemodialysis during admission. Continue with hemodialysis sessions as scheduled.    Diagnosis: Chronic systolic CHF (congestive heart failure)  Assessment and Plan of Treatment: Continue taking your medications as prescribed.   -Your Coreg dose was increased to 25mg twice per day.  -STOP taking Entresto until you are seen by the Kidney Doctor.    Diagnosis: Diabetes mellitus  Assessment and Plan of Treatment: You have a history of diabetes mellitus and was initially on insulin. Your HbA1c is 7, and is at goal. This number reflects the control of your blood glucose over the past 3 months. The sugar level is stable and taking insulin increases your risk for very low sugar levels (hypoglycemia).  -STOP taking insulin.  -Continue with a diet that is low in carbohydrates (rice, sugars, potatoes, pasta)  -Follow up with your Primary Care Doctor as scheduled.    Diagnosis: Atrial fibrillation  Assessment and Plan of Treatment: Continue taking medication as prescribed.  -Follow up with your Cardiologist as scheduled. PRINCIPAL DISCHARGE DIAGNOSIS  Diagnosis: Abdominal pain  Assessment and Plan of Treatment: You had symptoms of abdominal pain because you had an increased build up of fluid in your abdomen. You had a procedure done (paracentesis) to remove the excess fluid from your abdomen and your symptoms have improved. You also had hemodialysis done to assist in the removal of excess fluid from your body. You were initially given antibiotic in case you had an infection. The antibiotic was stopped because the tests were negative for an infection.  -Please follow up with your Primary Care Doctor to obtain the results for the analysis of the fluid obtained from your abdomen.      SECONDARY DISCHARGE DIAGNOSES  Diagnosis: Hypertension  Assessment and Plan of Treatment: Continue taking your medications as prescribed. Check your blood pressures regularly and keep a log for your doctor. If you have symptoms of dizziness and/or lightheadedness please check your blood pressure before taking your medications.   The following medications were added to your regimen:  -Lisinopril 40mg once per day.  -Hydralazine 75mg twice per day.       Diagnosis: ESRD on dialysis  Assessment and Plan of Treatment: You received hemodialysis during admission. Continue with hemodialysis sessions as scheduled.    Diagnosis: Chronic systolic CHF (congestive heart failure)  Assessment and Plan of Treatment: Continue taking your medications as prescribed.   -Your Coreg dose was increased to 25mg twice per day.  -STOP taking aldactoneuntil you are seen by the Kidney Doctor.    Diagnosis: Diabetes mellitus  Assessment and Plan of Treatment: You have a history of diabetes mellitus and was initially on insulin. Your HbA1c is 7, and is at goal. This number reflects the control of your blood glucose over the past 3 months. The sugar level is stable.   -Continue with a diet that is low in carbohydrates (rice, sugars, potatoes, pasta)  -Follow up with your Primary Care Doctor as scheduled.    Diagnosis: Atrial fibrillation  Assessment and Plan of Treatment: Continue taking medication as prescribed.  -Follow up with your Cardiologist as scheduled. PRINCIPAL DISCHARGE DIAGNOSIS  Diagnosis: Abdominal pain  Assessment and Plan of Treatment: You had symptoms of abdominal pain because you had an increased build up of fluid in your abdomen. You had a procedure done (paracentesis) to remove the excess fluid from your abdomen and your symptoms have improved. You also had hemodialysis done to assist in the removal of excess fluid from your body. You were initially given antibiotic in case you had an infection. The antibiotic was stopped because the tests were negative for an infection.  -Please follow up with your Primary Care Doctor to obtain the results for the analysis of the fluid obtained from your abdomen.      SECONDARY DISCHARGE DIAGNOSES  Diagnosis: Hypertension  Assessment and Plan of Treatment: Continue taking your medications as prescribed. Check your blood pressures regularly and keep a log for your doctor. If you have symptoms of dizziness and/or lightheadedness please check your blood pressure before taking your medications.   The following medications were added to your regimen:  -Lisinopril 40mg once per day.  -Hydralazine 75mg twice per day.       Diagnosis: ESRD on dialysis  Assessment and Plan of Treatment: You received hemodialysis during admission. Continue with hemodialysis sessions as scheduled.    Diagnosis: Chronic systolic CHF (congestive heart failure)  Assessment and Plan of Treatment: Continue taking your medications as prescribed.   -Your Coreg dose was increased to 25mg twice per day.  -STOP taking aldactone and entresto. Monitor your weight. Fluid restriction 1 liter.    Diagnosis: Diabetes mellitus  Assessment and Plan of Treatment: You have a history of diabetes mellitus and was initially on insulin. Your HbA1c is 7, and is at goal. This number reflects the control of your blood glucose over the past 3 months. The sugar level is stable.   -Continue with a diet that is low in carbohydrates (rice, sugars, potatoes, pasta)  -Follow up with your Primary Care Doctor as scheduled.    Diagnosis: Atrial fibrillation  Assessment and Plan of Treatment: Continue taking medication as prescribed.  -Follow up with your Cardiologist as scheduled.

## 2019-09-24 NOTE — DISCHARGE NOTE PROVIDER - PROVIDER RX CONTACT NUMBER
Generalized Allergic Reaction (Other)  You are having an allergic reaction. Almost anything can cause one. Different people are allergic to different things. It is usually something that you ate or swallowed, came into contact with by getting or putting it on your skin or clothes, or something you breathed in the air. This can be very annoying and sometimes scary.  Most of us think of allergic reactions when we have a rash or itchy skin. Symptoms can include:  · Rash, hives, redness, welts, blisters  · Itching, burning, stinging, pain  · Dry, flaky, cracking, scaly skin  · Swelling of the face, lips or other parts of the body  · Hoarse voice  · Trouble swallowing, feeling like your throat is closing  · Trouble breathing, wheezing  · Nausea, vomiting, diarrhea, stomach cramps  · Feeling faint or lightheaded, rapid heart rate  Sometimes the cause may be obvious. However, there are so many things that can cause a reaction that you may not be able to figure out. The most important things to help find your allergen are:  · Remembering when it started  · What you were doing at the time or just before that  · Any activities you were involved in  · Any new products or contacts  Here are some common causes, but remember almost anything can cause a reaction, and you may not even be aware that you came into contact with one of these things.  · Dust, mold, pollen  · Plants, such aspoison ivy and poison oak are common ones, but there are many others  · Animals  · Foods such as shrimp, shellfish, peanuts, milk products, gluten, eggs; also colorings, flavorings, additives  · Insect bites or stings such as bees, mosquitos, flees, ticks  · Medicines such as penicillin, sulfa drugs, amoxicillin, aspirin, ibuprofen; any medicine can cause a reaction  · Jewelry such as nickel, gold (new, or something youve worn for a while including zippers, and buttons)  · Latex such as in gloves, clothes, toys, balloons, or some tapes (some people  allergic to latex may also have problems with foods like bananas, avocados, kiwi, papaya, or chestnuts)  · Lotions, perfumes, cosmetics, soaps, shampoos, skincare products, nail products  · Chemicals or dyes in clothing, linen, , hair dyes, soaps, iodine  Many viruses and common colds can cause a rash, but is not an allergic reaction. Sometimes it is hard to tell the difference between allergies, sensitivity and intolerance to something. This is especially true with food. Many things can cause diarrhea, vomiting, stomach cramps, and skin irritation.  Home care    The goal of our treatment is to help relieve the symptoms, and get you feeling better. The rash will usually fade over several days, but can sometimes last a couple of weeks. Over the next couple of days, there may be times when it is gets a little worse, and then better again. Here are some things to do:  · If you know what you are allergic to, avoid it because future reactions could be worse than this one.  · Avoid tight clothing and anything that heats up your skin (hot showers/baths, direct sunlight) since heat will make itching worse.  · An ice pack will relieve local areas of intense itching and redness. Dont put the ice directly on the skin, because it can damage the skin. You can also ice put it in a plastic bag. Wrap it in something like a thin towel, lana shirt, or cloth, or use a bag of frozen peas.  · Oral Benadryl (diphenhydramine) is an antihistamine available at drug and grocery stores. Unless a prescription antihistamine was given, Benadryl may be used to reduce itching if large areas of the skin are involved. It may make you sleepy, so be careful using it in the daytime or when going to school, working, or driving. [NOTE: Do not use Benadryl if you have glaucoma or if you are a man with trouble urinating due to an enlarged prostate.] There are antihistamines that causes less drowsiness and are good alternatives for daytime use. Ask  your pharmacist for suggestions.  · Do not use Benadryl cream on your skin, because in some people it can cause a further reaction, and make you allergic to Benadryl.  · Try not to scratch. This can tear the skin and cause an infection.  · Using heat-steam to clean your home, using high-efficiency particulate (HEPA) vacuums and filters, avoiding food and pet triggers, exterminating cockroaches, and frequent house cleaning are a few of the strategies used to decrease allergic reactions.  Follow-up care  Follow up with your healthcare provider, or as advised. If you had a severe reaction today, or if you have had several mild-moderate allergic reactions in the past, ask your doctor about allergy testing to find out what you are allergic to. If your reaction included dizziness, fainting or trouble breathing or swallowing, ask your doctor about carrying injectable epinephrine for home use.  Call 911  Call 911 if any of these occur:  · Trouble breathing or swallowing, wheezing  · Hoarse voice or trouble speaking  · Confused  · Very drowsy or trouble awakening  · Fainting or loss of consciousness  · Rapid heart rate  · Low blood pressure  · Feeling of doom  · Nausea, vomiting, abdominal pain, diarrhea  · Vomiting blood, or large amounts of blood in stool  · Seizure  When to seek medical advice  Call your healthcare provider right away if any of these occur:  · Spreading areas of itching, redness or swelling  · New or worse swelling in the face, eyelids, lips, mouth, throat or tongue  · Dizziness, weakness  · Signs of infection:  ¨ Spreading redness  ¨ Increased pain or swelling  ¨ Fever (1 degree above your normal temperature) lasting for 24 to 48 hours  Date Last Reviewed: 7/30/2015  © 9235-8478 [employer]. [address] [place] [id number]. All rights reserved. This information is not intended as a substitute for professional medical care. Always follow your healthcare professional's  instructions.         [id number]

## 2019-09-24 NOTE — PHYSICAL THERAPY INITIAL EVALUATION ADULT - CRITERIA FOR SKILLED THERAPEUTIC INTERVENTIONS
Home with RW, continue to ambulate as tolerated, Assist on stairs and PRN at home./anticipated discharge recommendation/impairments found

## 2019-09-24 NOTE — PHYSICAL THERAPY INITIAL EVALUATION ADULT - PERTINENT HX OF CURRENT PROBLEM, REHAB EVAL
60 yo female h/o ESRD (HD MWF) chronic diastolic  HFrEF (EF 35-40% TTE 02/11/19), NICM (cardiac cath 03/18 non-obstructive CAD), paroxysmal Afib (no AC 2*2 GIB), uremic pericarditis s/p pericardiocentesis, right pleural effusion s/p pigtail and removal, IDDM, and HTN. diffuse abdominal pain, nausea and vomiting for 2 weeks. CT abdomen pelvis completed and showed large volume ascites. pt s/p paracentesis

## 2019-09-24 NOTE — DISCHARGE NOTE PROVIDER - NSDCFUADDINST_GEN_ALL_CORE_FT
-perform activities as tolerated. -perform activities as tolerated.  -Follow up with the laboratory for cytopathology results.

## 2019-09-24 NOTE — PROGRESS NOTE ADULT - ASSESSMENT
ESRD additional HD today for UF  Then will cont on  MWF schedule   Consent in chart    Abdominal pain + ascites s/p paracentesis    Anemia no need for MARGI at this time    HTN BP not ideal will inc hydralazine to 75 mg TID    OK to d/c today after HD from renal perspective    will follow

## 2019-09-24 NOTE — PHYSICAL THERAPY INITIAL EVALUATION ADULT - ADDITIONAL COMMENTS
Pt lives in a private home with her daughter. Pt's daughter in law stays with her during the day to assist as needed. 6 steps to enter with handrails, 8 steps inside with handrails. Pt was ambulatory PTA with RW. Pt owns RW and shower chair.

## 2019-09-24 NOTE — DISCHARGE NOTE NURSING/CASE MANAGEMENT/SOCIAL WORK - PATIENT PORTAL LINK FT
You can access the FollowMyHealth Patient Portal offered by [facility] by registering at the following website: [email or web address]. By joining [employer]’s FollowMyHealth portal, you will also be able to view your health information using other applications (apps) compatible with our system.

## 2019-09-24 NOTE — PROGRESS NOTE ADULT - SUBJECTIVE AND OBJECTIVE BOX
NEPHROLOGY INTERVAL HPI/OVERNIGHT EVENTS:    Examined  clinically volume overloaded - improved  feeling better    MEDICATIONS  (STANDING):  aspirin enteric coated 81 milliGRAM(s) Oral daily  atorvastatin 40 milliGRAM(s) Oral at bedtime  calcium carbonate   1250 mG (OsCal) 1 Tablet(s) Oral three times a day  carvedilol 25 milliGRAM(s) Oral every 12 hours  dextrose 50% Injectable 12.5 Gram(s) IV Push once  dextrose 50% Injectable 25 Gram(s) IV Push once  dextrose 50% Injectable 25 Gram(s) IV Push once  heparin  Injectable 5000 Unit(s) SubCutaneous every 12 hours  hydrALAZINE 75 milliGRAM(s) Oral two times a day  insulin glargine Injectable (LANTUS) 5 Unit(s) SubCutaneous at bedtime  insulin lispro (HumaLOG) corrective regimen sliding scale   SubCutaneous three times a day before meals  lisinopril 40 milliGRAM(s) Oral daily  pantoprazole  Injectable 40 milliGRAM(s) IV Push two times a day    MEDICATIONS  (PRN):  acetaminophen   Tablet .. 650 milliGRAM(s) Oral every 6 hours PRN Temp greater or equal to 38C (100.4F), Mild Pain (1 - 3)  dextrose 40% Gel 15 Gram(s) Oral once PRN Blood Glucose LESS THAN 70 milliGRAM(s)/deciliter  glucagon  Injectable 1 milliGRAM(s) IntraMuscular once PRN Glucose LESS THAN 70 milligrams/deciliter  ondansetron Injectable 4 milliGRAM(s) IV Push every 8 hours PRN Nausea and/or Vomiting      Allergies    No Known Allergies    Intolerances        Vital Signs Last 24 Hrs  T(C): 37 (24 Sep 2019 07:48), Max: 37.2 (23 Sep 2019 23:35)  T(F): 98.6 (24 Sep 2019 07:48), Max: 98.9 (23 Sep 2019 23:35)  HR: 66 (24 Sep 2019 07:48) (66 - 979)  BP: 172/80 (24 Sep 2019 08:11) (143/88 - 172/80)  BP(mean): --  RR: 18 (24 Sep 2019 07:48) (18 - 18)  SpO2: 98% (24 Sep 2019 07:48) (96% - 98%)  Daily     Daily Weight in k.3 (24 Sep 2019 06:20)    PHYSICAL EXAM:  GENERAL: appears chronically ill  HEAD:  Atraumatic, Normocephalic  EYES: EOMI  NECK: Supple, neck  veins full  NERVOUS SYSTEM:  Alert & Oriented X3  CHEST/LUNG: Clear to percussion bilaterally; No rales  HEART: Regular rate and rhythm; No murmurs  ABDOMEN: Soft, Nontender, distended + ascites- improved    LABS:                        10.5   3.99  )-----------( 244      ( 23 Sep 2019 08:13 )             34.1         133<L>  |  91<L>  |  37.0<H>  ----------------------------<  197<H>  5.1   |  25.0  |  4.70<H>    Ca    9.0      24 Sep 2019 08:36  Phos  4.0       Mg     2.2         TPro  7.2  /  Alb  3.3  /  TBili  0.6  /  DBili  x   /  AST  13  /  ALT  6   /  AlkPhos  96          Magnesium, Serum: 2.2 mg/dL ( @ 08:36)  Phosphorus Level, Serum: 4.0 mg/dL ( @ 08:36)          RADIOLOGY & ADDITIONAL TESTS:

## 2019-09-24 NOTE — PROGRESS NOTE ADULT - ASSESSMENT
60 yo female with hx of ESRD on HD MWF, chronic diastolic  HFrEF (EF 35-40% TTE 02/11/19), NICM (cardiac cath 03/18 non-obstructive CAD), paroxysmal Afib (no AC due to GI bleed), uremic pericarditis s/p pericardiocentesis, right sided pleural effusion s/p pigtail catheter and removal, IDDM, and HTN, who presented to ED with diffuse abdominal pain, nausea and vomiting for 2 weeks. CT abdomen pelvis completed and showed large volume ascites. Pt admitted witn intractable abd pain with associated N/V secondary to large amount of ascites. Clinical concern for possible SBP. IR consulted and pt s/p paracentesis with 5.6L of fluid was drained, with <250 WBC. Pending cytopathology. Fluid cx negative for SBP. Pt has recurrent hospitalizations for fluid overload in the setting of ESRD/ acute on chronic diastolic HF requiring multiple HD sessions to off load fluid. Will plan to dialyze today and tomorrow. Slow clinical improvement.     Abdominal pain secondary to Large volume ascites/fluid overload in the setting ESRD/ acute on chronic diastolic HF  - Abdominal pain, nausea, vomiting improving after paracentesis and dialysis.  - s/p removal 5.6 l   - Initial concern for SBP, cell count showing <250 PMC, not consistent with SBP, will d/c Cefepime.   - Cytopathology pending.  - LFT wnl, CT abdomen showing no liver mass.  - Zofran PRN nausea/ vomiting  - strict I/O   - daily weight   - c/w offloading fluid with HD will perform serial HD for today, tomorrow and to resume regular HD session on wed   - nephro f/u noted and appreciated     Acute on Chronic systolic HFrEF: 35-40%   - EF on  02/2019 35- 40% - - Non obstructive CAD by cath in 3/2018   - c/w Strict I/o   -Fluid restriction - daily weight   - c/w home dose of coreg and lisinopril.  - C/w atorvastatin, aspirin.   - c/w offloading fluid with HD    ESRD on HD   - will monitor electrolytes closely   - c/w HD to offload fluid  - Renal restriction diet  - nephro f/u noted and appreciated, d/w Dr. Aguilar the plan of care    T2DM   - Last HbA1C: 7  - slihgtly elevated fs   - C/w Lantus 5 at bedtime and Mod dose SS.   - c/w hypoglycemia protocol    HTN  - bp elevated this am. Will continue to monitor and adjust regimen if needed  -c/w Coreg, lisinopril and hydralazine.     Anemia in ESRD  - h/h stable    Prophylactic measure:  - Will start Heparin 5000 units SQ q12 h  - Protonix 40 mg QD    Advanced Directive: Full code   Next of kin: Garrick Wiggins     Disposition:  LOS 2-3 days pending improvement on abdominal pain and off-loading with HD.

## 2019-09-24 NOTE — DISCHARGE NOTE PROVIDER - HOSPITAL COURSE
60 yo female with hx of ESRD on HD MWF, chronic diastolic  HFrEF (EF 35-40% TTE 02/11/19), NICM (cardiac cath 03/18 non-obstructive CAD), paroxysmal Afib (no AC due to GI bleed), uremic pericarditis s/p pericardiocentesis, right sided pleural effusion s/p pigtail catheter and removal, IDDM, and HTN, who presented to ED with diffuse abdominal pain, nausea and vomiting for 2 weeks. CT abdomen pelvis completed and showed large volume ascites. Pt admitted with intractable abd pain with associated N/V secondary to large amount of ascites. Clinical concern for possible SBP. IR consulted and pt s/p paracentesis with 5.6L of fluid was drained, with <250 WBC. Fluid cx negative for SBP and Cefepime was discontinued. Pending cytopathology. Pt has recurrent hospitalizations for fluid overload in the setting of ESRD/ acute on chronic diastolic HF requiring multiple HD sessions to off load fluid. Pt will have dialysis today prior to d/c. Pt w/ Acute on Chronic systolic HFrEF: 35-40%. EF on  02/2019 35- 40%. Non-obstructive CAD by cath in 3/2018. Pt to c/w Coreg, Coreg home dose increased from 12.5mg BID to 25mg BID. Entresto held during admission. Pt to c/w Atorvastatin and ASA. ESRD on HD, pt received HD during admission, to c/w HD as scheduled. H/o anemia 2/2 ESRD, h/h stable no bleeding. Pt noted to have elevated BP during admission, pt to c/w Coreg and Amlodipine. Lisinopril and Hydralazine added to regimen. PT w/ T2DM, HbA1C: 7 and is at goal. Pt to c/w dietary management at this time, no insulin needed. PT consult noted, recommends home w/ assist. Pt already has RW and shower chair at home. Pt to f/u w/ PMD, Cardiologist and Nephrologist after discharge.        Vital Signs Last 24 Hrs    T(C): 37 (24 Sep 2019 14:08), Max: 37.2 (23 Sep 2019 23:35)    T(F): 98.6 (24 Sep 2019 14:08), Max: 98.9 (23 Sep 2019 23:35)    HR: 61 (24 Sep 2019 14:08) (61 - 70)    RR: 18 (24 Sep 2019 14:08) (18 - 18)    SpO2: 99% (24 Sep 2019 14:08) (96% - 99%)        PHYSICAL EXAM    GENERAL: Not in acute distress. Awake, alert and oriented x 3. Cooperative. Facial pallor noted.     HEENT: Clear conjunctiva b/l, moist oral mucosa.     NECK: Supple.     CARDIOVASCULAR: Regular heart rate and rhythm, S1/S2 present. No rubs/ no murmurs/ no rubs.     RESPIRATORY: Clear to auscultation b/l, no wheezing or crackles     GASTROINTESTINAL: Soft, mild tenderness on palpation of right abdomen. Noted dressing on right side c/i/d. Bowel sounds present in 4 quadrants.     EXTREMITIES: No clubbing, cyanosis or edema. No calf tenderness. Moves 4 extremities spontaneously     SKIN: warm and dry with normal turgor.    NEURO: Alert/oriented x 3, no focal deficits, grossly intact     PSYCH: normal affect and mood 58 yo female with hx of ESRD on HD MWF, chronic diastolic  HFrEF (EF 35-40% TTE 02/11/19), NICM (cardiac cath 03/18 non-obstructive CAD), paroxysmal Afib (no AC due to GI bleed), uremic pericarditis s/p pericardiocentesis, right sided pleural effusion s/p pigtail catheter and removal, IDDM, and HTN, who presented to ED with diffuse abdominal pain, nausea and vomiting for 2 weeks. CT abdomen pelvis completed and showed large volume ascites. Pt admitted with intractable abd pain with associated N/V secondary to large amount of ascites. Clinical concern for possible SBP. IR consulted and pt s/p paracentesis with 5.6L of fluid was drained, with <250 WBC. Fluid cx negative for SBP and Cefepime was discontinued. Pending cytopathology. Pt has recurrent hospitalizations for fluid overload in the setting of ESRD/ acute on chronic diastolic HF requiring multiple HD sessions to off load fluid. Pt will have dialysis today prior to d/c. Pt w/ Acute on Chronic systolic HFrEF: 35-40%. EF on  02/2019 35- 40%. Non-obstructive CAD by cath in 3/2018. Pt to c/w Coreg, Coreg home dose increased from 12.5mg BID to 25mg BID. Entresto held during admission. Pt to c/w Atorvastatin and ASA. ESRD on HD, pt received HD during admission, to c/w HD as scheduled. H/o anemia 2/2 ESRD, h/h stable no bleeding. Pt noted to have elevated BP during admission, pt to c/w Coreg and Amlodipine. Lisinopril and Hydralazine added to regimen. PT w/ T2DM, HbA1C: 7 and is at goal. Pt to c/w dietary management at this time, no insulin needed. Patient medically stable to be discharged home. Home care to be reinstated.  PT consult noted, recommends home w/ assist. Pt already has RW and shower chair at home. Pt to f/u w/ PMD, Cardiologist and Nephrologist after discharge.        Vital Signs Last 24 Hrs    T(C): 37 (24 Sep 2019 14:08), Max: 37.2 (23 Sep 2019 23:35)    T(F): 98.6 (24 Sep 2019 14:08), Max: 98.9 (23 Sep 2019 23:35)    HR: 61 (24 Sep 2019 14:08) (61 - 70)    RR: 18 (24 Sep 2019 14:08) (18 - 18)    SpO2: 99% (24 Sep 2019 14:08) (96% - 99%)        PHYSICAL EXAM    GENERAL: Not in acute distress. Awake, alert and oriented x 3. Cooperative. Facial pallor noted.     HEENT: Clear conjunctiva b/l, moist oral mucosa.     NECK: Supple.     CARDIOVASCULAR: Regular heart rate and rhythm, S1/S2 present. No rubs/ no murmurs/ no rubs.     RESPIRATORY: Clear to auscultation b/l, no wheezing or crackles     GASTROINTESTINAL: Soft, mild tenderness on palpation of right abdomen. Noted dressing on right side c/i/d. Bowel sounds present in 4 quadrants.     EXTREMITIES: No clubbing, cyanosis or edema. No calf tenderness. Moves 4 extremities spontaneously     SKIN: warm and dry with normal turgor.    NEURO: Alert/oriented x 3, no focal deficits, grossly intact     PSYCH: normal affect and mood             Discharge planning took 46 minutes

## 2019-09-25 LAB — NON-GYNECOLOGICAL CYTOLOGY STUDY: SIGNIFICANT CHANGE UP

## 2019-09-28 LAB
CULTURE RESULTS: SIGNIFICANT CHANGE UP
SPECIMEN SOURCE: SIGNIFICANT CHANGE UP

## 2019-09-29 PROCEDURE — 83550 IRON BINDING TEST: CPT

## 2019-09-29 PROCEDURE — 80053 COMPREHEN METABOLIC PANEL: CPT

## 2019-09-29 PROCEDURE — 83735 ASSAY OF MAGNESIUM: CPT

## 2019-09-29 PROCEDURE — 83036 HEMOGLOBIN GLYCOSYLATED A1C: CPT

## 2019-09-29 PROCEDURE — 71045 X-RAY EXAM CHEST 1 VIEW: CPT

## 2019-09-29 PROCEDURE — 80048 BASIC METABOLIC PNL TOTAL CA: CPT

## 2019-09-29 PROCEDURE — 83880 ASSAY OF NATRIURETIC PEPTIDE: CPT

## 2019-09-29 PROCEDURE — 99261: CPT

## 2019-09-29 PROCEDURE — 99285 EMERGENCY DEPT VISIT HI MDM: CPT | Mod: 25

## 2019-09-29 PROCEDURE — 84100 ASSAY OF PHOSPHORUS: CPT

## 2019-09-29 PROCEDURE — 93970 EXTREMITY STUDY: CPT

## 2019-09-29 PROCEDURE — 85610 PROTHROMBIN TIME: CPT

## 2019-09-29 PROCEDURE — 87040 BLOOD CULTURE FOR BACTERIA: CPT

## 2019-09-29 PROCEDURE — 93005 ELECTROCARDIOGRAM TRACING: CPT

## 2019-09-29 PROCEDURE — 73590 X-RAY EXAM OF LOWER LEG: CPT

## 2019-09-29 PROCEDURE — 85027 COMPLETE CBC AUTOMATED: CPT

## 2019-09-29 PROCEDURE — 36415 COLL VENOUS BLD VENIPUNCTURE: CPT

## 2019-09-29 PROCEDURE — 96365 THER/PROPH/DIAG IV INF INIT: CPT

## 2019-09-29 PROCEDURE — 84484 ASSAY OF TROPONIN QUANT: CPT

## 2019-09-29 PROCEDURE — 82728 ASSAY OF FERRITIN: CPT

## 2019-09-29 PROCEDURE — T1013: CPT

## 2019-09-29 PROCEDURE — 83540 ASSAY OF IRON: CPT

## 2019-09-29 PROCEDURE — 85730 THROMBOPLASTIN TIME PARTIAL: CPT

## 2019-09-29 PROCEDURE — 82962 GLUCOSE BLOOD TEST: CPT

## 2019-09-29 PROCEDURE — 84466 ASSAY OF TRANSFERRIN: CPT

## 2019-10-02 PROCEDURE — 80048 BASIC METABOLIC PNL TOTAL CA: CPT

## 2019-10-02 PROCEDURE — 86900 BLOOD TYPING SEROLOGIC ABO: CPT

## 2019-10-02 PROCEDURE — 88305 TISSUE EXAM BY PATHOLOGIST: CPT

## 2019-10-02 PROCEDURE — 82962 GLUCOSE BLOOD TEST: CPT

## 2019-10-02 PROCEDURE — 83036 HEMOGLOBIN GLYCOSYLATED A1C: CPT

## 2019-10-02 PROCEDURE — 83735 ASSAY OF MAGNESIUM: CPT

## 2019-10-02 PROCEDURE — 84100 ASSAY OF PHOSPHORUS: CPT

## 2019-10-02 PROCEDURE — 74176 CT ABD & PELVIS W/O CONTRAST: CPT

## 2019-10-02 PROCEDURE — 96365 THER/PROPH/DIAG IV INF INIT: CPT | Mod: XU

## 2019-10-02 PROCEDURE — 97163 PT EVAL HIGH COMPLEX 45 MIN: CPT

## 2019-10-02 PROCEDURE — 85730 THROMBOPLASTIN TIME PARTIAL: CPT

## 2019-10-02 PROCEDURE — 87075 CULTR BACTERIA EXCEPT BLOOD: CPT

## 2019-10-02 PROCEDURE — 99261: CPT

## 2019-10-02 PROCEDURE — 82042 OTHER SOURCE ALBUMIN QUAN EA: CPT

## 2019-10-02 PROCEDURE — 85610 PROTHROMBIN TIME: CPT

## 2019-10-02 PROCEDURE — 87015 SPECIMEN INFECT AGNT CONCNTJ: CPT

## 2019-10-02 PROCEDURE — 84157 ASSAY OF PROTEIN OTHER: CPT

## 2019-10-02 PROCEDURE — 87206 SMEAR FLUORESCENT/ACID STAI: CPT

## 2019-10-02 PROCEDURE — 99285 EMERGENCY DEPT VISIT HI MDM: CPT | Mod: 25

## 2019-10-02 PROCEDURE — 83690 ASSAY OF LIPASE: CPT

## 2019-10-02 PROCEDURE — 86850 RBC ANTIBODY SCREEN: CPT

## 2019-10-02 PROCEDURE — 87116 MYCOBACTERIA CULTURE: CPT

## 2019-10-02 PROCEDURE — 82945 GLUCOSE OTHER FLUID: CPT

## 2019-10-02 PROCEDURE — 87040 BLOOD CULTURE FOR BACTERIA: CPT

## 2019-10-02 PROCEDURE — 89051 BODY FLUID CELL COUNT: CPT

## 2019-10-02 PROCEDURE — 76942 ECHO GUIDE FOR BIOPSY: CPT

## 2019-10-02 PROCEDURE — 88112 CYTOPATH CELL ENHANCE TECH: CPT

## 2019-10-02 PROCEDURE — 87070 CULTURE OTHR SPECIMN AEROBIC: CPT

## 2019-10-02 PROCEDURE — 87102 FUNGUS ISOLATION CULTURE: CPT

## 2019-10-02 PROCEDURE — 83615 LACTATE (LD) (LDH) ENZYME: CPT

## 2019-10-02 PROCEDURE — 87205 SMEAR GRAM STAIN: CPT

## 2019-10-02 PROCEDURE — 36415 COLL VENOUS BLD VENIPUNCTURE: CPT

## 2019-10-02 PROCEDURE — 96375 TX/PRO/DX INJ NEW DRUG ADDON: CPT

## 2019-10-02 PROCEDURE — T1013: CPT

## 2019-10-02 PROCEDURE — 86901 BLOOD TYPING SEROLOGIC RH(D): CPT

## 2019-10-02 PROCEDURE — 80053 COMPREHEN METABOLIC PANEL: CPT

## 2019-10-02 PROCEDURE — 85027 COMPLETE CBC AUTOMATED: CPT

## 2019-10-02 PROCEDURE — 83605 ASSAY OF LACTIC ACID: CPT

## 2019-10-16 LAB
CULTURE RESULTS: SIGNIFICANT CHANGE UP
SPECIMEN SOURCE: SIGNIFICANT CHANGE UP

## 2019-11-13 LAB
CULTURE RESULTS: SIGNIFICANT CHANGE UP
SPECIMEN SOURCE: SIGNIFICANT CHANGE UP

## 2019-12-15 ENCOUNTER — INPATIENT (INPATIENT)
Facility: HOSPITAL | Age: 59
LOS: 2 days | Discharge: HOME | DRG: 377 | End: 2019-12-18
Attending: INTERNAL MEDICINE | Admitting: HOSPITALIST
Payer: MEDICARE

## 2019-12-15 VITALS
OXYGEN SATURATION: 99 % | TEMPERATURE: 98 F | SYSTOLIC BLOOD PRESSURE: 116 MMHG | DIASTOLIC BLOOD PRESSURE: 64 MMHG | HEART RATE: 71 BPM | RESPIRATION RATE: 18 BRPM

## 2019-12-15 DIAGNOSIS — I77.0 ARTERIOVENOUS FISTULA, ACQUIRED: Chronic | ICD-10-CM

## 2019-12-15 DIAGNOSIS — I25.119 ATHEROSCLEROTIC HEART DISEASE OF NATIVE CORONARY ARTERY WITH UNSPECIFIED ANGINA PECTORIS: ICD-10-CM

## 2019-12-15 DIAGNOSIS — Z49.01 ENCOUNTER FOR FITTING AND ADJUSTMENT OF EXTRACORPOREAL DIALYSIS CATHETER: Chronic | ICD-10-CM

## 2019-12-15 LAB
ALBUMIN SERPL ELPH-MCNC: 3 G/DL — LOW (ref 3.3–5.2)
ALP SERPL-CCNC: 125 U/L — HIGH (ref 40–120)
ALT FLD-CCNC: <5 U/L — SIGNIFICANT CHANGE UP
ANION GAP SERPL CALC-SCNC: 17 MMOL/L — SIGNIFICANT CHANGE UP (ref 5–17)
APTT BLD: 31.3 SEC — SIGNIFICANT CHANGE UP (ref 27.5–36.3)
AST SERPL-CCNC: 13 U/L — SIGNIFICANT CHANGE UP
BILIRUB SERPL-MCNC: 0.5 MG/DL — SIGNIFICANT CHANGE UP (ref 0.4–2)
BUN SERPL-MCNC: 23 MG/DL — HIGH (ref 8–20)
CALCIUM SERPL-MCNC: 8.7 MG/DL — SIGNIFICANT CHANGE UP (ref 8.6–10.2)
CHLORIDE SERPL-SCNC: 89 MMOL/L — LOW (ref 98–107)
CO2 SERPL-SCNC: 33 MMOL/L — HIGH (ref 22–29)
CREAT SERPL-MCNC: 4.19 MG/DL — HIGH (ref 0.5–1.3)
GLUCOSE SERPL-MCNC: 122 MG/DL — HIGH (ref 70–115)
HCT VFR BLD CALC: 31.4 % — LOW (ref 34.5–45)
HGB BLD-MCNC: 9.3 G/DL — LOW (ref 11.5–15.5)
INR BLD: 1.13 RATIO — SIGNIFICANT CHANGE UP (ref 0.88–1.16)
LIDOCAIN IGE QN: 18 U/L — LOW (ref 22–51)
MCHC RBC-ENTMCNC: 26.5 PG — LOW (ref 27–34)
MCHC RBC-ENTMCNC: 29.6 GM/DL — LOW (ref 32–36)
MCV RBC AUTO: 89.5 FL — SIGNIFICANT CHANGE UP (ref 80–100)
NT-PROBNP SERPL-SCNC: HIGH PG/ML (ref 0–300)
PLATELET # BLD AUTO: 348 K/UL — SIGNIFICANT CHANGE UP (ref 150–400)
POTASSIUM SERPL-MCNC: 3.8 MMOL/L — SIGNIFICANT CHANGE UP (ref 3.5–5.3)
POTASSIUM SERPL-SCNC: 3.8 MMOL/L — SIGNIFICANT CHANGE UP (ref 3.5–5.3)
PROT SERPL-MCNC: 7.1 G/DL — SIGNIFICANT CHANGE UP (ref 6.6–8.7)
PROTHROM AB SERPL-ACNC: 13 SEC — HIGH (ref 10–12.9)
RBC # BLD: 3.51 M/UL — LOW (ref 3.8–5.2)
RBC # FLD: 15.9 % — HIGH (ref 10.3–14.5)
SODIUM SERPL-SCNC: 139 MMOL/L — SIGNIFICANT CHANGE UP (ref 135–145)
TROPONIN T SERPL-MCNC: 0.91 NG/ML — HIGH (ref 0–0.06)
WBC # BLD: 5.45 K/UL — SIGNIFICANT CHANGE UP (ref 3.8–10.5)
WBC # FLD AUTO: 5.45 K/UL — SIGNIFICANT CHANGE UP (ref 3.8–10.5)

## 2019-12-15 PROCEDURE — 76705 ECHO EXAM OF ABDOMEN: CPT | Mod: 26

## 2019-12-15 PROCEDURE — 99223 1ST HOSP IP/OBS HIGH 75: CPT

## 2019-12-15 PROCEDURE — 71045 X-RAY EXAM CHEST 1 VIEW: CPT | Mod: 26

## 2019-12-15 PROCEDURE — 93010 ELECTROCARDIOGRAM REPORT: CPT | Mod: 76

## 2019-12-15 PROCEDURE — 99285 EMERGENCY DEPT VISIT HI MDM: CPT

## 2019-12-15 RX ORDER — ONDANSETRON 8 MG/1
4 TABLET, FILM COATED ORAL EVERY 6 HOURS
Refills: 0 | Status: DISCONTINUED | OUTPATIENT
Start: 2019-12-15 | End: 2019-12-16

## 2019-12-15 RX ORDER — PANTOPRAZOLE SODIUM 20 MG/1
40 TABLET, DELAYED RELEASE ORAL ONCE
Refills: 0 | Status: COMPLETED | OUTPATIENT
Start: 2019-12-15 | End: 2019-12-15

## 2019-12-15 RX ORDER — ONDANSETRON 8 MG/1
4 TABLET, FILM COATED ORAL ONCE
Refills: 0 | Status: COMPLETED | OUTPATIENT
Start: 2019-12-15 | End: 2019-12-15

## 2019-12-15 RX ORDER — MORPHINE SULFATE 50 MG/1
2 CAPSULE, EXTENDED RELEASE ORAL
Refills: 0 | Status: DISCONTINUED | OUTPATIENT
Start: 2019-12-15 | End: 2019-12-16

## 2019-12-15 RX ORDER — SACUBITRIL AND VALSARTAN 24; 26 MG/1; MG/1
1 TABLET, FILM COATED ORAL
Refills: 0 | Status: DISCONTINUED | OUTPATIENT
Start: 2019-12-15 | End: 2019-12-18

## 2019-12-15 RX ORDER — PANTOPRAZOLE SODIUM 20 MG/1
40 TABLET, DELAYED RELEASE ORAL DAILY
Refills: 0 | Status: DISCONTINUED | OUTPATIENT
Start: 2019-12-15 | End: 2019-12-16

## 2019-12-15 RX ADMIN — PANTOPRAZOLE SODIUM 40 MILLIGRAM(S): 20 TABLET, DELAYED RELEASE ORAL at 22:12

## 2019-12-15 RX ADMIN — ONDANSETRON 4 MILLIGRAM(S): 8 TABLET, FILM COATED ORAL at 16:26

## 2019-12-15 RX ADMIN — MORPHINE SULFATE 2 MILLIGRAM(S): 50 CAPSULE, EXTENDED RELEASE ORAL at 16:28

## 2019-12-15 RX ADMIN — PANTOPRAZOLE SODIUM 40 MILLIGRAM(S): 20 TABLET, DELAYED RELEASE ORAL at 16:27

## 2019-12-15 NOTE — H&P ADULT - ASSESSMENT
59 yr old female w ESRD on HD M-W-F, DM II, PAF on no AC due to GI bleed, HFrEF 35-40%, nonobstructive CAD, pericarditis s/p pericardiocentesis, recurrent ascites s/p multiple paracentesis last one 1month ago at [facility] presented to ED c/o chest pain/epigastric pain with associated nausea and brown color vomitus, pain is 5/10 with radiation to the back admitted for possible GIB, elevated troponin r/o ACS.

## 2019-12-15 NOTE — CONSULT NOTE ADULT - ASSESSMENT
59 year old with history of ESRD - HD MWF, HTN, PAF, DM, presents with complaint of nausea and vomiting followed by chest discomfort at rest. Denies recent exertional symptoms. Has not missed HD sessions. LHC 3/23/18 showed normal coronaries.   She does complain of distended abdomen. Has prior history of pericardial effusion.     1. chest pain: atypical, preceded by nausea and vomiting. Normal LHC March 2018  2. history of pericardial effusion  3. ascites    Recommendations:  Ordered TTE. R/o ACS with serial troponins 59 year old with history of ESRD - HD MWF, HTN, PAF, DM, presents with complaint of nausea and vomiting followed by chest discomfort at rest. Denies recent exertional symptoms. Has not missed HD sessions. LHC 3/23/18 showed normal coronaries.   She does complain of distended abdomen. Has prior history of pericardial effusion.     1. chest pain: atypical, preceded by nausea and vomiting. Normal LHC March 2018. Marginal troponin elevation is chronic, due to CKD  2. history of pericardial effusion  3. ascites: may require further fluid removal via HD    Recommendations:  Ordered TTE. R/o ACS with serial troponins. Ascites suggests she may need more fluid removal during HD

## 2019-12-15 NOTE — H&P ADULT - PROBLEM SELECTOR PLAN 2
pt endorses to coffee brown emesis r/o UGIB   NPO except meds  CBC q 6 hrs, transfuse as needed   cont with protonix 40 mg IV   CT a/p to evaluated ascites, possible GIB   [facility] consult   zofran 4 mg ODT prn N, V

## 2019-12-15 NOTE — H&P ADULT - HISTORY OF PRESENT ILLNESS
59 yr old female w ESRD on HD M-W-F, DM II, PAF on no AC due to GI bleed, HFrEF 35-40%, nonobstructive CAD presented to ED c/o chest pain and vomiting. 59 yr old female w ESRD on HD M-W-F, DM II, PAF on no AC due to GI bleed, HFrEF 35-40%, nonobstructive CAD, pericarditis s/p pericardiocentesis, recurrent ascites s/p multiple paracentesis last one 1month ago at [facility] presented to ED c/o chest pain/epigastric pain with associated nausea and brown color vomitus, pain is 5/10 with radiation to the back. Pt states that she is s/p repair of umbilical hernia at [facility] 11-. Placed on doxycycline for cellulitis.  Also had 7 L ascitic fluid removed at time of surgery and has been doing well since. 59 yr old female w ESRD on HD M-W-F, DM II, PAF on no AC due to GI bleed, HFrEF 35-40%, nonobstructive CAD, pericarditis s/p pericardiocentesis, recurrent ascites s/p multiple paracentesis last one 1month ago at [facility] presented to ED c/o chest pain/epigastric pain with associated nausea and brown color vomitus, pain is 5/10 with radiation to the back. Pt states that she is s/p repair of umbilical hernia at [facility] 11-. Placed on doxycycline for cellulitis.  Also had 7 L ascitic fluid removed at time of surgery and has been doing well since. Denies sob, palpitations, fever, chills.

## 2019-12-15 NOTE — ED ADULT NURSE NOTE - OBJECTIVE STATEMENT
Pt arrives to ED c/o chest pain, nausea/vomiting and diarrhea that started this morning.  Pt states she had a hernia repair 1 month ago and she feels like her abdomen is distended.  Pt has dialysis on M/W/F. (Left fistula +bruit) Pt A & OX4. evaluated by MD waiting for test results.

## 2019-12-15 NOTE — CONSULT NOTE ADULT - SUBJECTIVE AND OBJECTIVE BOX
Patient is a 59y old  Female who presents with a chief complaint of chest pain    HPI: 59 year old with history of ESRD - HD MWF, HTN, PAF, DM, presents with complaint of nausea and vomiting followed by chest discomfort at rest. Denies recent exertional symptoms. Has not missed HD sessions. LHC 3/23/18 showed normal coronaries.   She does complain of distended abdomen. Has prior history of pericardial effusion.       PAST MEDICAL & SURGICAL HISTORY:  Coronary artery disease, angina presence unspecified, unspecified vessel or lesion type, unspecified whether native or transplanted heart  Paroxysmal atrial fibrillation  Anemia due to chronic kidney disease, unspecified CKD stage  Chronic systolic congestive heart failure  Pleural effusion  Pericardial effusion  End stage renal disease on dialysis  HLD (hyperlipidemia)  HTN (hypertension)  DM (diabetes mellitus)  A-V fistula  Encounter for dialysis catheter care      Allergies    No Known Allergies        MEDICATIONS  (STANDING):    MEDICATIONS  (PRN):  morphine  - Injectable 2 milliGRAM(s) IV Push every 5 minutes PRN Chest Pain      FAMILY HISTORY:  Family history of kidney disease in brother (Sibling)      SOCIAL HISTORY:     CIGARETTES: denies    ALCOHOL: denies    REVIEW OF SYSTEMS:  ENMT:  No difficulty hearing, tinnitus, vertigo; No sinus or throat pain  NECK: No pain or stiffness  RESPIRATORY: No cough, wheezing, chills or hemoptysis; No Shortness of Breath  CARDIOVASCULAR: see HPI  GASTROINTESTINAL: see HPI  GENITOURINARY: No dysuria, frequency, hematuria, or incontinence  NEUROLOGICAL: No headaches, memory loss, loss of strength, numbness, or tremors  SKIN: No itching, burning, rashes, or lesions   LYMPH Nodes: No enlarged glands  ENDOCRINE: No heat or cold intolerance; No hair loss  MUSCULOSKELETAL: No joint pain or swelling; No muscle, back, or extremity pain  PSYCHIATRIC: No depression, anxiety, mood swings, or difficulty sleeping  HEME/LYMPH: No easy bruising, or bleeding gums  ALLERY AND IMMUNOLOGIC: No hives or eczema	    Vital Signs Last 24 Hrs  T(C): 37.1 (15 Dec 2019 18:57), Max: 37.1 (15 Dec 2019 18:57)  T(F): 98.8 (15 Dec 2019 18:57), Max: 98.8 (15 Dec 2019 18:57)  HR: 72 (15 Dec 2019 18:57) (71 - 72)  BP: 113/67 (15 Dec 2019 18:57) (113/67 - 116/64)  BP(mean): --  RR: 18 (15 Dec 2019 18:57) (18 - 18)  SpO2: 95% (15 Dec 2019 18:57) (95% - 99%)    Daily     Daily     I&O's Detail      PHYSICAL EXAM:  Appearance: Normal, well nourished	  HEENT:   Normal oral mucosa, PERRL, EOMI, sclera non-icteric	  Lymphatic: No cervical lymphadenopathy  Cardiovascular: Normal S1 S2, No JVD, No cardiac murmurs, No carotid bruits, No peripheral edema  Respiratory: Lungs clear to auscultation	  Psychiatry: A & O x 3, Mood & affect appropriate  Gastrointestinal:  Soft, Non-tender, + BS, no bruits	  Skin: No rashes, No ecchymoses, No cyanosis  Neurologic: Grossly non-focal with full strength in all four extremities  Extremities: Normal range of motion, No clubbing, cyanosis or edema  Vascular: Peripheral pulses palpable 2+ bilaterally      INTERPRETATION OF TELEMETRY:  SR    ECG: NSR, no ischemic changes    LABS:                        9.3    5.45  )-----------( 348      ( 15 Dec 2019 16:21 )             31.4     12-15    139  |  89<L>  |  23.0<H>  ----------------------------<  122<H>  3.8   |  33.0<H>  |  4.19<H>    Ca    8.7      15 Dec 2019 16:21    TPro  7.1  /  Alb  3.0<L>  /  TBili  0.5  /  DBili  x   /  AST  13  /  ALT  <5  /  AlkPhos  125<H>  12-15    CARDIAC MARKERS ( 15 Dec 2019 16:21 )  x     / 0.91 ng/mL / x     / x     / x          PT/INR - ( 15 Dec 2019 16:21 )   PT: 13.0 sec;   INR: 1.13 ratio         PTT - ( 15 Dec 2019 16:21 )  PTT:31.3 sec    I&O's Summary    BNPSerum Pro-Brain Natriuretic Peptide: >98936 pg/mL (12-15 @ 16:21)    RADIOLOGY & ADDITIONAL STUDIES:

## 2019-12-15 NOTE — H&P ADULT - PROBLEM SELECTOR PLAN 4
cont with HD as scheduled  electrolytes within normal limits cont with HD as scheduled  electrolytes within normal limits  Nephro consulted Lauren

## 2019-12-15 NOTE — CONSULT NOTE ADULT - ATTENDING COMMENTS
VTE   med rec reviewed paperwork she came w , scanned in alpha tab  TTE      follow up  abd exam  US ordered by ED  consider CT/GI evaluation  CBC q 6 hr w next draw at [facility] VTE   med rec reviewed paperwork she came w , scanned in alpha tab  TTE  called renal @ [phone] at 20:31 for AM consult    follow up  abd exam  US ordered by ED; performed w results pending  consider CT/GI evaluation  CBC q 6 hr w next draw at [facility]

## 2019-12-15 NOTE — ED PROVIDER NOTE - OBJECTIVE STATEMENT
60 yo female pmh esrd M-W-F  , CAD, htn, hld; comes to ed chest pain after vomiting; pt denies any radiatoin of pain; denies fever, chills

## 2019-12-15 NOTE — H&P ADULT - NSHPPHYSICALEXAM_GEN_ALL_CORE
T(C): 37.2 (12-16-19 @ 05:04), Max: 37.2 (12-16-19 @ 00:17)  HR: 68 (12-16-19 @ 05:04) (68 - 76)  BP: 122/64 (12-16-19 @ 05:04) (113/67 - 127/68)  RR: 18 (12-16-19 @ 05:04) (16 - 18)  SpO2: 96% (12-16-19 @ 05:04) (95% - 99%)    GENERAL: patient appears well, no acute distress, appropriate, pleasant  EYES: sclera clear, no exudates  ENMT: oropharynx clear without erythema, no exudates, moist mucous membranes  NECK: supple, soft, no thyromegaly noted  LUNGS: good air entry bilaterally, clear to auscultation, symmetric breath sounds, no wheezing or rhonchi appreciated  HEART: soft S1/S2, regular rate and rhythm, no murmurs noted, no lower extremity edema  GASTROINTESTINAL: abdomen is soft, nontender, nondistended, normoactive bowel sounds, no palpable masses  INTEGUMENT: good skin turgor, warm skin, appears well perfused  MUSCULOSKELETAL: no clubbing or cyanosis, no obvious deformity  NEUROLOGIC: awake, alert, oriented x3, good muscle tone in 4 extremities, no obvious sensory deficits  PSYCHIATRIC: mood is good, affect is congruent, linear and logical thought process  HEME/LYMPH: no palpable supraclavicular nodules, no obvious ecchymosis or petechiae

## 2019-12-15 NOTE — ED ADULT NURSE REASSESSMENT NOTE - NS ED NURSE REASSESS COMMENT FT1
Handoff received from offgoing RN. Charting as noted. Pt. received AxOx4, resting in stretcher, in NAD. Vital signs stable and as charted. Pt. in NSR on cardiac monitor. Breathing spontaneous, unlabored, and symmetrical on 3L NC. Skin warm, dry, normal for race. Fall Precautions maintained. Call bell within reach. Educated pt. on plan of care, pt verblized understanding. Pt. ADMITTED at this time to telemetry, telehospitalist at bedside. Pt. safety and comfort measures maintained.
Per SNA Pt's O2 level 88-90%, pt placed on 2L O2 nasal canula, O2 level improved - bwteen 95% - 96%. Pt remains A & XO4.
Pt medicated per MD orders, tolerated well, states she feels a little relief with the pain medication.

## 2019-12-15 NOTE — CONSULT NOTE ADULT - SUBJECTIVE AND OBJECTIVE BOX
Reviewed EMR, spoke to son and pt in [place]    59 yr old female w ESRD on HD M-W-F, DM II, PAF on no AC due to GI bleed, HFrEF 35-40%, nonobstructive CAD presented to ED c/o chest pain and vomiting    Pt states she had surgery 1 month ago for her stomach.  Record reveal repair of umbilical hernia at [facility].  3 days now has had intermittent chest/upper epigastric pain w some radiation to the back; graded 5/10  + associated nausea and vomiting of brown material  + dark stool  Has not eaten at all today but was able to take her medications    In ED VSS on review.  Trop w mild elevation.  EKG NSR w poor RWP  exam remarkable for epigastric tenderness;  US ordered  Given morphine w partial decrease in pain      PAST MEDICAL HX  Anemia due to chronic kidney disease, unspecified CKD stage    Ascites  cx neg  Coronary artery disease, angina presence unspecified, unspecified vessel or lesion type, unspecified whether native or transplanted heart    DM (diabetes mellitus)    ESRD end stage renal disease on dialysis  GERD gastroesophageal reflux disease  HFrEF 35-40%    HLD (hyperlipidemia)    HTN (hypertension)    PAF paroxysmal atrial fibrillation    Pericardial effusion, s/p pericardiocentesis for uremic pericarditis   Pleural effusion.    PAST SURGICAL HX  A-V fistula  Cardiac cath    Colonoscopy   Encounter for dialysis catheter care  Tubal ligation  Umbilical hernia repair: laparoscopic 11- [facility]; 7 L ascitic fluid removed    FAMILY HX  kidney disease: brother  no family hx of ulcers      SOCIAL HX  nonsmoker    ROS  as above  GEN no dizziness  RESP did feel SOB but is better on oxygen  CARD + chest pain/epigastric pain        T(C): 37.1 (12-15-19 @ 18:57), Max: 37.1 (12-15-19 @ 18:57)  HR: 76 (12-15-19 @ 19:35) (71 - 76)  BP: 118/66 (12-15-19 @ 19:35) (113/67 - 118/66)  RR: 16 (12-15-19 @ 19:35) (16 - 18)  SpO2: 98% (12-15-19 @ 19:35) (95% - 99%)    PHYSICAL EXAM: evaluation precludes physical exam. Pertinent physical exam findings as per video conference with  teleNA at bedside is as follows:    pleasant female looks uncomfortable due to pain  wearing nasal cannula speaking in full sentences      CARDIAC MARKERS ( 15 Dec 2019 16:21 )  x     / 0.91 ng/mL / x     / x     / x                                9.3    5.45  )-----------( 348      ( 15 Dec 2019 16:21 )             31.4     15 Dec 2019 16:21    139    |  89     |  23.0   ----------------------------<  122    3.8     |  33.0   |  4.19     Ca    8.7        15 Dec 2019 16:21    TPro  7.1    /  Alb  3.0    /  TBili  0.5    /  DBili  x      /  AST  13     /  ALT  <5     /  AlkPhos  125    15 Dec 2019 16:21    PT/INR - ( 15 Dec 2019 16:21 )   PT: 13.0 sec;   INR: 1.13 ratio         PTT - ( 15 Dec 2019 16:21 )  PTT:31.3 sec  CAPILLARY BLOOD GLUCOSE        LIVER FUNCTIONS - ( 15 Dec 2019 16:21 )  Alb: 3.0 g/dL / Pro: 7.1 g/dL / ALK PHOS: 125 U/L / ALT: <5 U/L / AST: 13 U/L / GGT: x             EXAM:  XR CHEST PORTABLE IMMED 1V                          PROCEDURE DATE:  12/15/2019          INTERPRETATION:  Chest radiograph (one view)     CPT 19717    CLINICAL INFORMATION:  Patient is unable to communicate.  Chest pain.    TECHNIQUE:  Single frontal view of the chest was obtained.    FINDINGS:  Prior study dated 7/19/2019 was available for review.    The lungs demonstrate bilateral atelectatic changes at both lung bases.   No pleural effusion is seen. The heart and mediastinum appear intact.      IMPRESSION: Bilateral lower lobe atelectatic changes noted.      EKG NSR 72 w poor RWP no significant change when compared to older EKG Reviewed EMR, spoke to son and pt in [place]    59 yr old female w ESRD on HD M-W-F, DM II, PAF on no AC due to GI bleed, HFrEF 35-40%, nonobstructive CAD presented to ED c/o chest pain and vomiting    Pt states she had surgery 1 month ago for her stomach.  Record reveal repair of umbilical hernia at [facility].  3 days now has had intermittent chest/upper epigastric pain w some radiation to the back; graded 5/10  + associated nausea and vomiting of brown material  + dark stool  Has not eaten at all today but was able to take her medications    In ED VSS on review.  Trop w mild elevation.  EKG NSR w poor RWP  exam remarkable for epigastric tenderness;  US ordered  Given morphine w partial decrease in pain      PAST MEDICAL HX  Anemia due to chronic kidney disease, unspecified CKD stage    Ascites  cx neg  Coronary artery disease, angina presence unspecified, unspecified vessel or lesion     type, unspecified whether native or transplanted heart    DM (diabetes mellitus)    ESRD end stage renal disease on dialysis  GERD gastroesophageal reflux disease  HFrEF 35-40%    HLD (hyperlipidemia)    HTN (hypertension)    PAF paroxysmal atrial fibrillation    Pericardial effusion, s/p pericardiocentesis for uremic pericarditis   Pleural effusion.    PAST SURGICAL HX  A-V fistula  Cardiac cath    Colonoscopy   Encounter for dialysis catheter care  Tubal ligation  Umbilical hernia repair: laparoscopic 11- [facility]; 7 L ascitic fluid removed    FAMILY HX  kidney disease: brother  no family hx of ulcers      SOCIAL HX  nonsmoker      ROS  as above  GEN no dizziness  RESP did feel SOB but is better on oxygen  CARD + chest pain/epigastric pain  GI +N, V, epig pain as above          T(C): 37.1 (12-15-19 @ 18:57), Max: 37.1 (12-15-19 @ 18:57)  HR: 76 (12-15-19 @ 19:35) (71 - 76)  BP: 118/66 (12-15-19 @ 19:35) (113/67 - 118/66)  RR: 16 (12-15-19 @ 19:35) (16 - 18)  SpO2: 98% (12-15-19 @ 19:35) (95% - 99%)    PHYSICAL EXAM: evaluation precludes physical exam. Pertinent physical exam findings as per video conference with  teleNA at bedside is as follows:    pleasant female looks uncomfortable due to pain  wearing nasal cannula speaking in full sentences      CARDIAC MARKERS ( 15 Dec 2019 16:21 )  x     / 0.91 ng/mL / x     / x     / x                                9.3    5.45  )-----------( 348      ( 15 Dec 2019 16:21 )             31.4     15 Dec 2019 16:21    139    |  89     |  23.0   ----------------------------<  122    3.8     |  33.0   |  4.19     Ca    8.7        15 Dec 2019 16:21    TPro  7.1    /  Alb  3.0    /  TBili  0.5    /  DBili  x      /  AST  13     /  ALT  <5     /  AlkPhos  125    15 Dec 2019 16:21    PT/INR - ( 15 Dec 2019 16:21 )   PT: 13.0 sec;   INR: 1.13 ratio         PTT - ( 15 Dec 2019 16:21 )  PTT:31.3 sec  CAPILLARY BLOOD GLUCOSE        LIVER FUNCTIONS - ( 15 Dec 2019 16:21 )  Alb: 3.0 g/dL / Pro: 7.1 g/dL / ALK PHOS: 125 U/L / ALT: <5 U/L / AST: 13 U/L / GGT: x                 EXAM:  XR CHEST PORTABLE IMMED 1V                          PROCEDURE DATE:  12/15/2019      INTERPRETATION:  Chest radiograph (one view)     CPT 38701    CLINICAL INFORMATION:  Patient is unable to communicate.  Chest pain.    TECHNIQUE:  Single frontal view of the chest was obtained.    FINDINGS:  Prior study dated 7/19/2019 was available for review.    The lungs demonstrate bilateral atelectatic changes at both lung bases.   No pleural effusion is seen. The heart and mediastinum appear intact.      IMPRESSION: Bilateral lower lobe atelectatic changes noted.            EKG NSR 72 w poor RWP no significant change when compared to older EKG    <copied text>  ECHO  Summary:   1. Moderately decreased global left ventricular systolic function.   2. Left ventricular ejection fraction, by visual estimation, is 35 to 40%.   3. Severely enlarged left atrium.   4. Spectral Doppler shows pseudonormal pattern of left ventricular myocardial filling (Grade II diastolic dysfunction).   5. Moderately enlarged right ventricle Moderately reduced RV systolic function.   6. Mild mitral valve regurgitation.   7. Mild tricuspid regurgitation.   8. Mobile echo density is visualized on the aortic valve leaflets most consistent with Lambl's excrescences, however cannot entirely exclude vegetation. Recommend GERALD if clinically correlated.    [id number] Elian Richards Jr., MD, Electronically signed on 2/11/2019 at 5:06:46 PM  <end of copied test> Reviewed EMR, spoke to son and pt in [place]    59 yr old female w ESRD on HD M-W-F, DM II, PAF on no AC due to GI bleed, HFrEF 35-40%, nonobstructive CAD presented to ED c/o chest pain and vomiting    Pt states she had surgery 1 month ago for her stomach.  Record reveal repair of umbilical hernia at [facility] 11-. Placed on doxycycline for cellulitis.  Also had 7 L ascitic fluid removed at time of surgery  3 days now has had intermittent chest/upper epigastric pain w some radiation to the back; graded 5/10  + associated nausea and vomiting of brown material  + dark stool  Has not eaten at all today but was able to take her medications    In ED VSS on review.  Trop w mild elevation.  EKG NSR w poor RWP  exam remarkable for epigastric tenderness;  US ordered  Given morphine w partial decrease in pain      PAST MEDICAL HX  Anemia due to chronic kidney disease, unspecified CKD stage    Ascites  cx neg  Coronary artery disease, angina presence unspecified, unspecified vessel or lesion     type, unspecified whether native or transplanted heart    DM (diabetes mellitus)    ESRD end stage renal disease on dialysis  GERD gastroesophageal reflux disease  HFrEF 35-40%    HLD (hyperlipidemia)    HTN (hypertension)    PAF paroxysmal atrial fibrillation    Pericardial effusion, s/p pericardiocentesis for uremic pericarditis   Pleural effusion.    PAST SURGICAL HX  A-V fistula  Cardiac cath    Colonoscopy   Encounter for dialysis catheter care  Tubal ligation  Umbilical hernia repair: laparoscopic 11- [facility]; 7 L ascitic fluid removed    FAMILY HX  kidney disease: brother  no family hx of ulcers      SOCIAL HX  nonsmoker      ROS  as above  GEN no dizziness  RESP did feel SOB but is better on oxygen  CARD + chest pain/epigastric pain  GI +N, V, epig pain as above          T(C): 37.1 (12-15-19 @ 18:57), Max: 37.1 (12-15-19 @ 18:57)  HR: 76 (12-15-19 @ 19:35) (71 - 76)  BP: 118/66 (12-15-19 @ 19:35) (113/67 - 118/66)  RR: 16 (12-15-19 @ 19:35) (16 - 18)  SpO2: 98% (12-15-19 @ 19:35) (95% - 99%)    PHYSICAL EXAM: evaluation precludes physical exam. Pertinent physical exam findings as per video conference with  teleNA at bedside is as follows:    pleasant female looks uncomfortable due to pain  wearing nasal cannula speaking in full sentences      CARDIAC MARKERS ( 15 Dec 2019 16:21 )  x     / 0.91 ng/mL / x     / x     / x                                9.3    5.45  )-----------( 348      ( 15 Dec 2019 16:21 )             31.4     15 Dec 2019 16:21    139    |  89     |  23.0   ----------------------------<  122    3.8     |  33.0   |  4.19     Ca    8.7        15 Dec 2019 16:21    TPro  7.1    /  Alb  3.0    /  TBili  0.5    /  DBili  x      /  AST  13     /  ALT  <5     /  AlkPhos  125    15 Dec 2019 16:21    PT/INR - ( 15 Dec 2019 16:21 )   PT: 13.0 sec;   INR: 1.13 ratio         PTT - ( 15 Dec 2019 16:21 )  PTT:31.3 sec  CAPILLARY BLOOD GLUCOSE        LIVER FUNCTIONS - ( 15 Dec 2019 16:21 )  Alb: 3.0 g/dL / Pro: 7.1 g/dL / ALK PHOS: 125 U/L / ALT: <5 U/L / AST: 13 U/L / GGT: x                 EXAM:  XR CHEST PORTABLE IMMED 1V                          PROCEDURE DATE:  12/15/2019      INTERPRETATION:  Chest radiograph (one view)     CPT 29277    CLINICAL INFORMATION:  Patient is unable to communicate.  Chest pain.    TECHNIQUE:  Single frontal view of the chest was obtained.    FINDINGS:  Prior study dated 7/19/2019 was available for review.    The lungs demonstrate bilateral atelectatic changes at both lung bases.   No pleural effusion is seen. The heart and mediastinum appear intact.      IMPRESSION: Bilateral lower lobe atelectatic changes noted.            EKG NSR 72 w poor RWP no significant change when compared to older EKG    <copied text>  ECHO  Summary:   1. Moderately decreased global left ventricular systolic function.   2. Left ventricular ejection fraction, by visual estimation, is 35 to 40%.   3. Severely enlarged left atrium.   4. Spectral Doppler shows pseudonormal pattern of left ventricular myocardial filling (Grade II diastolic dysfunction).   5. Moderately enlarged right ventricle Moderately reduced RV systolic function.   6. Mild mitral valve regurgitation.   7. Mild tricuspid regurgitation.   8. Mobile echo density is visualized on the aortic valve leaflets most consistent with Lambl's excrescences, however cannot entirely exclude vegetation. Recommend GERALD if clinically correlated.    [id number] Elian Richards Jr., MD, Electronically signed on 2/11/2019 at 5:06:46 PM  <end of copied test>

## 2019-12-15 NOTE — CONSULT NOTE ADULT - ASSESSMENT
59 yr old female w ESRD on HD M-W-F, DM II, PAF on no AC due to GI bleed, HFrEF 35-40%, nonobstructive CAD presented to ED c/o chest pain and vomiting    #elevated troponin  1. admit telemetry  2. serial trop  3, ekg    #chest pain/epigastric pain  although has risks as ACS  this likely represents GIB wcoffee brown emesis  1. NPO except meds  2. CBC q 6 hrs  3, protonix  4. to consider CT  5. will need GI    #ESRD  1. daily weights 59 yr old female w ESRD on HD M-W-F, DM II, PAF on no AC due to GI bleed, HFrEF 35-40%, nonobstructive CAD presented to ED c/o chest pain and vomiting    #elevated troponin  1. admit telemetry  2. serial trop  3, ekg  4. echo  4. cardiology    #chest pain/epigastric pain  although has risks as ACS  this likely represents GIB wcoffee brown emesis  1. NPO except meds  2. CBC q 6 hrs  3, protonix  4. to consider CT if ascites or exam indicates  5. will need GI  6. zofran 4 mg ODT prn N, V  7. measure QT    #HFrEF  1. continue coreg 12.5 mg BID w parameters  2. asa 81 mg enteric coated  3. entresto qd  4. daily weights  5. TTE      #DM II  1. NPO  2. Hb A1c  3. once eating can consider BGM w basal and w ISS    #ESRD  1. daily weights  2. renal re HD    #GERD  hx GI Bleed not sure when  1. continue protonix  2. IV tonight      #PAF currently in NSR  not on AC due to prior GI bleed  1. on coreg        IMPROVE VTE Individual Risk Assessment    RISK                                                                Points    [  ] Previous VTE                                                  3    [  ] Thrombophilia                                               2    [  ] Lower limb paralysis                                      2        (unable to hold up >15 seconds)      [  ] Current Cancer                                              2         (within 6 months)    [  ] Immobilization > 24 hrs                                1    [  ] ICU/CCU stay > 24 hours                              1    [  ] Age > 60                                                      1    IMPROVE VTE Score __0_______    IMPROVE Score 0-1: Low Risk, No VTE prophylaxis required for most patients, encourage ambulation.   IMPROVE Score 2-3: At risk, pharmacologic VTE prophylaxis is indicated for most patients (in the absence of a contraindication)  IMPROVE Score > or = 4: High Risk, pharmacologic VTE prophylaxis is indicated for most patients (in the absence of a contraindication) 59 yr old female w ESRD on HD M-W-F, DM II, PAF on no AC due to GI bleed, HFrEF 35-40%, nonobstructive CAD presented to ED c/o chest pain and vomiting    #elevated troponin  1. admit telemetry  2. serial trop  3, ekg  4. echo  4. cardiology    #chest pain/epigastric pain  although has risks as ACS  this likely represents GIB wcoffee brown emesis  1. NPO except meds  2. CBC q 6 hrs  3, protonix 40 mg IV tonight  4. to consider CT if ascites or exam indicates need  5. will need GI  6. zofran 4 mg ODT prn N, V  7. measure QT    #HFrEF  1. continue coreg 12.5 mg BID w parameters  2. asa 81 mg enteric coated  3. entresto qd  4. daily weights  5. TTE      #DM II  1. NPO  2. Hb A1c  3. once eating can consider BGM w basal and w ISS    #ESRD  1. daily weights  2. renal re HD    #GERD  hx GI Bleed not sure when  1. continue protonix  2. IV tonight      #PAF currently in NSR  not on AC due to prior GI bleed  1. on coreg        IMPROVE VTE Individual Risk Assessment    RISK                                                                Points    [  ] Previous VTE                                                  3    [  ] Thrombophilia                                               2    [  ] Lower limb paralysis                                      2        (unable to hold up >15 seconds)      [  ] Current Cancer                                              2         (within 6 months)    [  ] Immobilization > 24 hrs                                1    [  ] ICU/CCU stay > 24 hours                              1    [  ] Age > 60                                                      1    IMPROVE VTE Score __0_______    IMPROVE Score 0-1: Low Risk, No VTE prophylaxis required for most patients, encourage ambulation.   IMPROVE Score 2-3: At risk, pharmacologic VTE prophylaxis is indicated for most patients (in the absence of a contraindication)  IMPROVE Score > or = 4: High Risk, pharmacologic VTE prophylaxis is indicated for most patients (in the absence of a contraindication)

## 2019-12-16 ENCOUNTER — RESULT REVIEW (OUTPATIENT)
Age: 59
End: 2019-12-16

## 2019-12-16 ENCOUNTER — TRANSCRIPTION ENCOUNTER (OUTPATIENT)
Age: 59
End: 2019-12-16

## 2019-12-16 DIAGNOSIS — E11.69 TYPE 2 DIABETES MELLITUS WITH OTHER SPECIFIED COMPLICATION: ICD-10-CM

## 2019-12-16 DIAGNOSIS — R10.13 EPIGASTRIC PAIN: ICD-10-CM

## 2019-12-16 DIAGNOSIS — N18.9 CHRONIC KIDNEY DISEASE, UNSPECIFIED: ICD-10-CM

## 2019-12-16 DIAGNOSIS — I50.22 CHRONIC SYSTOLIC (CONGESTIVE) HEART FAILURE: ICD-10-CM

## 2019-12-16 DIAGNOSIS — R18.8 OTHER ASCITES: ICD-10-CM

## 2019-12-16 DIAGNOSIS — N18.6 END STAGE RENAL DISEASE: ICD-10-CM

## 2019-12-16 DIAGNOSIS — Z29.9 ENCOUNTER FOR PROPHYLACTIC MEASURES, UNSPECIFIED: ICD-10-CM

## 2019-12-16 DIAGNOSIS — I25.119 ATHEROSCLEROTIC HEART DISEASE OF NATIVE CORONARY ARTERY WITH UNSPECIFIED ANGINA PECTORIS: ICD-10-CM

## 2019-12-16 DIAGNOSIS — I48.0 PAROXYSMAL ATRIAL FIBRILLATION: ICD-10-CM

## 2019-12-16 DIAGNOSIS — K92.0 HEMATEMESIS: ICD-10-CM

## 2019-12-16 LAB
ALBUMIN FLD-MCNC: 1.9 G/DL — SIGNIFICANT CHANGE UP
ANION GAP SERPL CALC-SCNC: 15 MMOL/L — SIGNIFICANT CHANGE UP (ref 5–17)
B PERT IGG+IGM PNL SER: CLEAR — SIGNIFICANT CHANGE UP
BUN SERPL-MCNC: 26 MG/DL — HIGH (ref 8–20)
CALCIUM SERPL-MCNC: 8.1 MG/DL — LOW (ref 8.6–10.2)
CHLORIDE SERPL-SCNC: 90 MMOL/L — LOW (ref 98–107)
CHOLEST SERPL-MCNC: 180 MG/DL — SIGNIFICANT CHANGE UP (ref 110–199)
CO2 SERPL-SCNC: 33 MMOL/L — HIGH (ref 22–29)
COLOR FLD: ABNORMAL
CREAT SERPL-MCNC: 5.08 MG/DL — HIGH (ref 0.5–1.3)
FLUID INTAKE SUBSTANCE CLASS: SIGNIFICANT CHANGE UP
FLUID SEGMENTED GRANULOCYTES: 19 % — SIGNIFICANT CHANGE UP
GLUCOSE BLDC GLUCOMTR-MCNC: 103 MG/DL — HIGH (ref 70–99)
GLUCOSE BLDC GLUCOMTR-MCNC: 114 MG/DL — HIGH (ref 70–99)
GLUCOSE BLDC GLUCOMTR-MCNC: 152 MG/DL — HIGH (ref 70–99)
GLUCOSE BLDC GLUCOMTR-MCNC: 154 MG/DL — HIGH (ref 70–99)
GLUCOSE FLD-MCNC: 132 MG/DL — SIGNIFICANT CHANGE UP
GLUCOSE SERPL-MCNC: 154 MG/DL — HIGH (ref 70–115)
GRAM STN FLD: SIGNIFICANT CHANGE UP
HBA1C BLD-MCNC: 7.7 % — HIGH (ref 4–5.6)
HCT VFR BLD CALC: 27.5 % — LOW (ref 34.5–45)
HCT VFR BLD CALC: 28.4 % — LOW (ref 34.5–45)
HCT VFR BLD CALC: 29.6 % — LOW (ref 34.5–45)
HDLC SERPL-MCNC: 35 MG/DL — LOW
HGB BLD-MCNC: 8.1 G/DL — LOW (ref 11.5–15.5)
HGB BLD-MCNC: 8.4 G/DL — LOW (ref 11.5–15.5)
HGB BLD-MCNC: 8.8 G/DL — LOW (ref 11.5–15.5)
LACTATE SERPL-SCNC: 0.6 MMOL/L — SIGNIFICANT CHANGE UP (ref 0.5–2)
LDH SERPL L TO P-CCNC: 143 U/L — SIGNIFICANT CHANGE UP
LIPID PNL WITH DIRECT LDL SERPL: 124 MG/DL — SIGNIFICANT CHANGE UP
LYMPHOCYTES # FLD: 20 % — SIGNIFICANT CHANGE UP
MCHC RBC-ENTMCNC: 26.3 PG — LOW (ref 27–34)
MCHC RBC-ENTMCNC: 26.6 PG — LOW (ref 27–34)
MCHC RBC-ENTMCNC: 29.5 GM/DL — LOW (ref 32–36)
MCHC RBC-ENTMCNC: 29.6 GM/DL — LOW (ref 32–36)
MCV RBC AUTO: 89.3 FL — SIGNIFICANT CHANGE UP (ref 80–100)
MCV RBC AUTO: 89.9 FL — SIGNIFICANT CHANGE UP (ref 80–100)
MONOS+MACROS # FLD: 61 % — SIGNIFICANT CHANGE UP
PLATELET # BLD AUTO: 304 K/UL — SIGNIFICANT CHANGE UP (ref 150–400)
PLATELET # BLD AUTO: 308 K/UL — SIGNIFICANT CHANGE UP (ref 150–400)
POTASSIUM SERPL-MCNC: 3.8 MMOL/L — SIGNIFICANT CHANGE UP (ref 3.5–5.3)
POTASSIUM SERPL-SCNC: 3.8 MMOL/L — SIGNIFICANT CHANGE UP (ref 3.5–5.3)
PROT FLD-MCNC: 4.5 G/DL — SIGNIFICANT CHANGE UP
RBC # BLD: 3.08 M/UL — LOW (ref 3.8–5.2)
RBC # BLD: 3.16 M/UL — LOW (ref 3.8–5.2)
RBC # FLD: 15.7 % — HIGH (ref 10.3–14.5)
RBC # FLD: 15.9 % — HIGH (ref 10.3–14.5)
RCV VOL RI: 190 /UL — HIGH (ref 0–0)
SODIUM SERPL-SCNC: 138 MMOL/L — SIGNIFICANT CHANGE UP (ref 135–145)
SPECIMEN SOURCE: SIGNIFICANT CHANGE UP
TOTAL CHOLESTEROL/HDL RATIO MEASUREMENT: 5 RATIO — SIGNIFICANT CHANGE UP (ref 3.3–7.1)
TOTAL NUCLEATED CELL COUNT, BODY FLUID: 174 /UL — SIGNIFICANT CHANGE UP
TRIGL SERPL-MCNC: 103 MG/DL — SIGNIFICANT CHANGE UP (ref 10–200)
TROPONIN T SERPL-MCNC: 0.84 NG/ML — HIGH (ref 0–0.06)
TUBE TYPE: SIGNIFICANT CHANGE UP
WBC # BLD: 4.33 K/UL — SIGNIFICANT CHANGE UP (ref 3.8–10.5)
WBC # BLD: 4.65 K/UL — SIGNIFICANT CHANGE UP (ref 3.8–10.5)
WBC # FLD AUTO: 4.33 K/UL — SIGNIFICANT CHANGE UP (ref 3.8–10.5)
WBC # FLD AUTO: 4.65 K/UL — SIGNIFICANT CHANGE UP (ref 3.8–10.5)

## 2019-12-16 PROCEDURE — 93010 ELECTROCARDIOGRAM REPORT: CPT

## 2019-12-16 PROCEDURE — 99233 SBSQ HOSP IP/OBS HIGH 50: CPT | Mod: GC

## 2019-12-16 PROCEDURE — 88305 TISSUE EXAM BY PATHOLOGIST: CPT | Mod: 26

## 2019-12-16 PROCEDURE — 88112 CYTOPATH CELL ENHANCE TECH: CPT | Mod: 26

## 2019-12-16 PROCEDURE — 99223 1ST HOSP IP/OBS HIGH 75: CPT

## 2019-12-16 PROCEDURE — 74176 CT ABD & PELVIS W/O CONTRAST: CPT | Mod: 26

## 2019-12-16 RX ORDER — SACCHAROMYCES BOULARDII 250 MG
250 POWDER IN PACKET (EA) ORAL
Refills: 0 | Status: DISCONTINUED | OUTPATIENT
Start: 2019-12-16 | End: 2019-12-17

## 2019-12-16 RX ORDER — DEXTROSE 50 % IN WATER 50 %
25 SYRINGE (ML) INTRAVENOUS ONCE
Refills: 0 | Status: DISCONTINUED | OUTPATIENT
Start: 2019-12-16 | End: 2019-12-18

## 2019-12-16 RX ORDER — PANTOPRAZOLE SODIUM 20 MG/1
40 TABLET, DELAYED RELEASE ORAL
Refills: 0 | Status: DISCONTINUED | OUTPATIENT
Start: 2019-12-16 | End: 2019-12-18

## 2019-12-16 RX ORDER — SODIUM CHLORIDE 9 MG/ML
1000 INJECTION, SOLUTION INTRAVENOUS
Refills: 0 | Status: DISCONTINUED | OUTPATIENT
Start: 2019-12-16 | End: 2019-12-18

## 2019-12-16 RX ORDER — DEXTROSE 50 % IN WATER 50 %
12.5 SYRINGE (ML) INTRAVENOUS ONCE
Refills: 0 | Status: DISCONTINUED | OUTPATIENT
Start: 2019-12-16 | End: 2019-12-18

## 2019-12-16 RX ORDER — INSULIN LISPRO 100/ML
VIAL (ML) SUBCUTANEOUS
Refills: 0 | Status: DISCONTINUED | OUTPATIENT
Start: 2019-12-16 | End: 2019-12-18

## 2019-12-16 RX ORDER — CALCIUM CARBONATE 500(1250)
1 TABLET ORAL THREE TIMES A DAY
Refills: 0 | Status: DISCONTINUED | OUTPATIENT
Start: 2019-12-16 | End: 2019-12-18

## 2019-12-16 RX ORDER — ONDANSETRON 8 MG/1
4 TABLET, FILM COATED ORAL EVERY 6 HOURS
Refills: 0 | Status: DISCONTINUED | OUTPATIENT
Start: 2019-12-16 | End: 2019-12-18

## 2019-12-16 RX ORDER — CEFTRIAXONE 500 MG/1
1000 INJECTION, POWDER, FOR SOLUTION INTRAMUSCULAR; INTRAVENOUS EVERY 24 HOURS
Refills: 0 | Status: DISCONTINUED | OUTPATIENT
Start: 2019-12-17 | End: 2019-12-17

## 2019-12-16 RX ORDER — GLUCAGON INJECTION, SOLUTION 0.5 MG/.1ML
1 INJECTION, SOLUTION SUBCUTANEOUS ONCE
Refills: 0 | Status: DISCONTINUED | OUTPATIENT
Start: 2019-12-16 | End: 2019-12-18

## 2019-12-16 RX ORDER — CEFTRIAXONE 500 MG/1
1000 INJECTION, POWDER, FOR SOLUTION INTRAMUSCULAR; INTRAVENOUS ONCE
Refills: 0 | Status: COMPLETED | OUTPATIENT
Start: 2019-12-16 | End: 2019-12-16

## 2019-12-16 RX ORDER — CARVEDILOL PHOSPHATE 80 MG/1
25 CAPSULE, EXTENDED RELEASE ORAL EVERY 12 HOURS
Refills: 0 | Status: DISCONTINUED | OUTPATIENT
Start: 2019-12-16 | End: 2019-12-18

## 2019-12-16 RX ORDER — FERROUS SULFATE 325(65) MG
325 TABLET ORAL DAILY
Refills: 0 | Status: DISCONTINUED | OUTPATIENT
Start: 2019-12-16 | End: 2019-12-18

## 2019-12-16 RX ORDER — DEXTROSE 50 % IN WATER 50 %
15 SYRINGE (ML) INTRAVENOUS ONCE
Refills: 0 | Status: DISCONTINUED | OUTPATIENT
Start: 2019-12-16 | End: 2019-12-18

## 2019-12-16 RX ORDER — CEFTRIAXONE 500 MG/1
INJECTION, POWDER, FOR SOLUTION INTRAMUSCULAR; INTRAVENOUS
Refills: 0 | Status: DISCONTINUED | OUTPATIENT
Start: 2019-12-16 | End: 2019-12-17

## 2019-12-16 RX ORDER — ERYTHROPOIETIN 10000 [IU]/ML
10000 INJECTION, SOLUTION INTRAVENOUS; SUBCUTANEOUS
Refills: 0 | Status: DISCONTINUED | OUTPATIENT
Start: 2019-12-16 | End: 2019-12-18

## 2019-12-16 RX ORDER — ALBUMIN HUMAN 25 %
50 VIAL (ML) INTRAVENOUS ONCE
Refills: 0 | Status: COMPLETED | OUTPATIENT
Start: 2019-12-16 | End: 2019-12-16

## 2019-12-16 RX ADMIN — Medication 325 MILLIGRAM(S): at 12:05

## 2019-12-16 RX ADMIN — Medication 50 MILLILITER(S): at 20:06

## 2019-12-16 RX ADMIN — PANTOPRAZOLE SODIUM 40 MILLIGRAM(S): 20 TABLET, DELAYED RELEASE ORAL at 17:28

## 2019-12-16 RX ADMIN — PANTOPRAZOLE SODIUM 40 MILLIGRAM(S): 20 TABLET, DELAYED RELEASE ORAL at 06:13

## 2019-12-16 RX ADMIN — Medication 1: at 08:51

## 2019-12-16 RX ADMIN — ONDANSETRON 4 MILLIGRAM(S): 8 TABLET, FILM COATED ORAL at 13:17

## 2019-12-16 RX ADMIN — Medication 250 MILLIGRAM(S): at 17:28

## 2019-12-16 RX ADMIN — Medication 1 TABLET(S): at 21:13

## 2019-12-16 RX ADMIN — CEFTRIAXONE 100 MILLIGRAM(S): 500 INJECTION, POWDER, FOR SOLUTION INTRAMUSCULAR; INTRAVENOUS at 08:53

## 2019-12-16 RX ADMIN — SACUBITRIL AND VALSARTAN 1 TABLET(S): 24; 26 TABLET, FILM COATED ORAL at 09:36

## 2019-12-16 RX ADMIN — CARVEDILOL PHOSPHATE 25 MILLIGRAM(S): 80 CAPSULE, EXTENDED RELEASE ORAL at 06:13

## 2019-12-16 RX ADMIN — CARVEDILOL PHOSPHATE 25 MILLIGRAM(S): 80 CAPSULE, EXTENDED RELEASE ORAL at 17:28

## 2019-12-16 RX ADMIN — Medication 1 TABLET(S): at 06:50

## 2019-12-16 NOTE — PROGRESS NOTE ADULT - SUBJECTIVE AND OBJECTIVE BOX
Patient is a 59y old  Female who presents with a chief complaint of chest pain/epigastric pain (16 Dec 2019 14:58)      INTERVAL HPI/OVERNIGHT EVENTS:  59y  Female admitted overnight.  1 episode of dark brown diarrhea overnight while in ambulance.  No other acute events overnight  Voiding spontaneously.  Has mild burning epigastric pain.    ROS Negative except as above.    Vital Signs Last 24 Hrs  T(C): 36.9 (16 Dec 2019 13:38), Max: 37.2 (16 Dec 2019 00:17)  T(F): 98.4 (16 Dec 2019 13:38), Max: 98.9 (16 Dec 2019 00:17)  HR: 69 (16 Dec 2019 13:38) (68 - 76)  BP: 148/77 (16 Dec 2019 13:38) (113/67 - 148/77)  RR: 20 (16 Dec 2019 13:38) (16 - 20)  SpO2: 95% (16 Dec 2019 13:38) (95% - 98%)    Telemetry: NSR, no alarms      I&O's Detail    15 Dec 2019 07:01  -  16 Dec 2019 07:00  --------------------------------------------------------  IN:    Oral Fluid: 90 mL  Total IN: 90 mL    OUT:  Total OUT: 0 mL    Total NET: 90 mL    PHYSICAL EXAM:    General: Elderly weak female, NAD, sick appereance  HEENT: Normocephalic, atraumatic, clear sclera, PERRLA, EOMI, Clear nares, Dry Mucosas, Normal Pharynx, No exudates, no lesions   Neck: Supple, no carotid bruits, no JVD, No thyromegaly  Respiratory: Normal respiratory rate and effort, decreased breath sounds, lungs are clear to auscultation bilaterally.  Cardiac: Regular rate and rhythm, no murmurs, rubs or gallop.  GI: Abdomen is globus, ascitic, waveform elicited, nontender, no rebound or guarding, distant and decreased bowel sounds.  Extremities: AV fistula in LUE, No cyanosis, pedal pulses +2 bilaterally.  Neurological: Patient is alert and oriented x4, CN II-XII grossly intact, no sensory or motor deficits.  Skin: Warm and dry, no abnormal discolorations, no bruises, no bleeding, no lacerations.    LABS:                        8.4    4.33  )-----------( 308      ( 16 Dec 2019 08:28 )             28.4     CBC Full  -  ( 16 Dec 2019 08:28 )  WBC Count : 4.33 K/uL  RBC Count : 3.16 M/uL  Hemoglobin : 8.4 g/dL  Hematocrit : 28.4 %  Platelet Count - Automated : 308 K/uL  Mean Cell Volume : 89.9 fl  Mean Cell Hemoglobin : 26.6 pg  Mean Cell Hemoglobin Concentration : 29.6 gm/dL  Auto Neutrophil # : x  Auto Lymphocyte # : x  Auto Monocyte # : x  Auto Eosinophil # : x  Auto Basophil # : x  Auto Neutrophil % : x  Auto Lymphocyte % : x  Auto Monocyte % : x  Auto Eosinophil % : x  Auto Basophil % : x    12-16    138  |  90<L>  |  26.0<H>  ----------------------------<  154<H>  3.8   |  33.0<H>  |  5.08<H>    Ca    8.1<L>      16 Dec 2019 08:28    TPro  7.1  /  Alb  3.0<L>  /  TBili  0.5  /  DBili  x   /  AST  13  /  ALT  <5  /  AlkPhos  125<H>  12-15    Lactate, Blood: 0.6 mmol/L (12-16 @ 08:28)    CAPILLARY BLOOD GLUCOSE  POCT Blood Glucose.: 103 mg/dL (16 Dec 2019 12:03)  POCT Blood Glucose.: 154 mg/dL (16 Dec 2019 08:12)    PT/INR - ( 15 Dec 2019 16:21 )   PT: 13.0 sec;   INR: 1.13 ratio    PTT - ( 15 Dec 2019 16:21 )  PTT:31.3 sec    Troponin T, Serum (12.15.19 @ 16:21)    Troponin T, Serum: 0.91 ng/mL    Troponin T, Serum in AM (12.16.19 @ 08:28)    Troponin T, Serum: 0.84 ng/mL    Serum Pro-Brain Natriuretic Peptide (12.15.19 @ 16:21)    Serum Pro-Brain Natriuretic Peptide: >58845 pg/mL    EKG: NSR, No ST elevations    < from: Xray Chest 1 View-PORTABLE IMMEDIATE (12.15.19 @ 17:27) >  FINDINGS:  Prior study dated 7/19/2019 was available for review.  The lungs demonstrate bilateral atelectatic changes at both lung bases.   No pleural effusion is seen. The heart and mediastinum appear intact.    IMPRESSION: Bilateral lower lobe atelectatic changes noted.    EMIGDIO LARA M.D., ATTENDING RADIOLOGIST  This document has been electronically signed. Dec 15 2019  5:45PM  < end of copied text >      < from: US Gallbladder (12.15.19 @ 20:17) >  FINDINGS:    The gallbladder is incompletely distended with a 4 x 5 mm echogenic nonshadowing lesion likely representing a polyp. The gallbladder wall is difficult to assess in the setting of incomplete distention.The common bile duct is not dilated, measuring 4 mm.  There is a large volume of free abdominal fluid as seen on prior CT of September 21, 2019.    IMPRESSION:  Contracted gallbladder limits evaluation for subtle stones. If strong clinical concern persists, repeat imaging may be obtained after an adequate fasting period of 8 hours.4 mm echogenic focus likely represents a polyp. No significant interval change compared to the previous ultrasound of February 13, 2019.   Recommend continued follow-up with ultrasound in one year.  Large volume of abdominopelvic ascites.    LEONARDO WESLEY M.D, ATTENDING RADIOLOGIST  This document has been electronically signed. Dec 15 2019  9:14PM  < end of copied text >    < from: CT Abdomen and Pelvis No Cont (12.16.19 @ 08:40) >  FINDINGS:  LOWER CHEST: Small bilateral partially loculated pleural effusions. Near atelectasis or scarring at the lung bases. Blood pool lower in attenuation than the myocardium suggestive of anemia.  LIVER: Nodular surface contour suggestive of cirrhosis.  BILE DUCTS: Normal caliber.  GALLBLADDER: Normal caliber wall. Punctate gallstone.  SPLEEN: Within normal limits.  PANCREAS: Within normal limits.  ADRENALS: Within normal limits.  KIDNEYS/URETERS: Mildly atrophic. No hydronephrosis. Low-attenuation renal lesions, likely cysts.  BLADDER: Distended, limiting evaluation.  REPRODUCTIVE ORGANS: Uterus and adnexa within normal limits.  BOWEL: Evaluation for active gastrointestinal bleeding is limited in the absence of intravenous contrast. No bowel obstruction. No evidence of appendicitis. High density material in the stomach, likely residual oral contrast.  PERITONEUM: Large amount of abdominal and pelvic ascites.  VESSELS: Abdominal aorta normal in caliber.  RETROPERITONEUM/LYMPH NODES: No lymphadenopathy.    ABDOMINAL WALL: Anasarca.  BONES: No aggressive osseous lesion.    IMPRESSION:   Large amount of abdominal and pelvic ascites.  Nodular surface contour of the liver suggestive of cirrhosis; clinical correlation is recommended.  Limited evaluation for active gastrointestinal bleeding in the absence of intravenous contrast.    YODIT CARRANZA   This document has been electronically signed. Dec 16 2019  9:22AM  < end of copied text >    MEDICATIONS  (STANDING):  albumin human 25% IVPB 50 milliLiter(s) IV Intermittent once  calcium carbonate   1250 mG (OsCal) 1 Tablet(s) Oral three times a day  carvedilol 25 milliGRAM(s) Oral every 12 hours  cefTRIAXone   IVPB      dextrose 5%. 1000 milliLiter(s) (50 mL/Hr) IV Continuous <Continuous>  dextrose 50% Injectable 12.5 Gram(s) IV Push once  dextrose 50% Injectable 25 Gram(s) IV Push once  dextrose 50% Injectable 25 Gram(s) IV Push once  epoetin gian Injectable 68516 Unit(s) IV Push <User Schedule>  ferrous    sulfate 325 milliGRAM(s) Oral daily  insulin lispro (HumaLOG) corrective regimen sliding scale   SubCutaneous three times a day before meals  pantoprazole  Injectable 40 milliGRAM(s) IV Push two times a day  saccharomyces boulardii 250 milliGRAM(s) Oral two times a day  sacubitril 49 mG/valsartan 51 mG 1 Tablet(s) Oral <User Schedule>    MEDICATIONS  (PRN):  dextrose 40% Gel 15 Gram(s) Oral once PRN Blood Glucose LESS THAN 70 milliGRAM(s)/deciliter  glucagon  Injectable 1 milliGRAM(s) IntraMuscular once PRN Glucose LESS THAN 70 milligrams/deciliter  ondansetron Injectable 4 milliGRAM(s) IV Push every 6 hours PRN Nausea and/or Vomiting Patient is a 59y old  Female who presents with a chief complaint of chest pain/epigastric pain (16 Dec 2019 14:58)    INTERVAL HPI/OVERNIGHT EVENTS:  59y  Female admitted overnight.  1 episode of dark brown diarrhea overnight while in ambulance.  No other acute events overnight  Voiding spontaneously.  Has mild burning epigastric pain.    ROS Negative except as above.    Vital Signs Last 24 Hrs  T(C): 36.9 (16 Dec 2019 13:38), Max: 37.2 (16 Dec 2019 00:17)  T(F): 98.4 (16 Dec 2019 13:38), Max: 98.9 (16 Dec 2019 00:17)  HR: 69 (16 Dec 2019 13:38) (68 - 76)  BP: 148/77 (16 Dec 2019 13:38) (113/67 - 148/77)  RR: 20 (16 Dec 2019 13:38) (16 - 20)  SpO2: 95% (16 Dec 2019 13:38) (95% - 98%)    Telemetry: NSR, no alarms      I&O's Detail    15 Dec 2019 07:01  -  16 Dec 2019 07:00  --------------------------------------------------------  IN:    Oral Fluid: 90 mL  Total IN: 90 mL    OUT:  Total OUT: 0 mL    Total NET: 90 mL    PHYSICAL EXAM:    General: Elderly weak female, NAD, sick appereance  HEENT: Normocephalic, atraumatic, clear sclera, PERRLA, EOMI, Clear nares, Dry Mucosas, Normal Pharynx, No exudates, no lesions   Neck: Supple, no carotid bruits, no JVD, No thyromegaly  Respiratory: Normal respiratory rate and effort, decreased breath sounds, prominent crackles/rales in lung bases bilaterally  Cardiac: Regular rate and rhythm, no murmurs, rubs or gallop.  GI: Abdomen is globus, ascitic, waveform elicited, nontender, no rebound or guarding, distant and decreased bowel sounds.  Extremities: AV fistula in LUE, No cyanosis, pedal pulses +2 bilaterally.  Neurological: Patient is alert and oriented x4, CN II-XII grossly intact, no sensory or motor deficits.  Skin: Warm and dry, no abnormal discolorations, no bruises, no bleeding, no lacerations.    LABS:                        8.4    4.33  )-----------( 308      ( 16 Dec 2019 08:28 )             28.4     CBC Full  -  ( 16 Dec 2019 08:28 )  WBC Count : 4.33 K/uL  RBC Count : 3.16 M/uL  Hemoglobin : 8.4 g/dL  Hematocrit : 28.4 %  Platelet Count - Automated : 308 K/uL  Mean Cell Volume : 89.9 fl  Mean Cell Hemoglobin : 26.6 pg  Mean Cell Hemoglobin Concentration : 29.6 gm/dL  Auto Neutrophil # : x  Auto Lymphocyte # : x  Auto Monocyte # : x  Auto Eosinophil # : x  Auto Basophil # : x  Auto Neutrophil % : x  Auto Lymphocyte % : x  Auto Monocyte % : x  Auto Eosinophil % : x  Auto Basophil % : x    12-16    138  |  90<L>  |  26.0<H>  ----------------------------<  154<H>  3.8   |  33.0<H>  |  5.08<H>    Ca    8.1<L>      16 Dec 2019 08:28    TPro  7.1  /  Alb  3.0<L>  /  TBili  0.5  /  DBili  x   /  AST  13  /  ALT  <5  /  AlkPhos  125<H>  12-15    Lactate, Blood: 0.6 mmol/L (12-16 @ 08:28)    CAPILLARY BLOOD GLUCOSE  POCT Blood Glucose.: 103 mg/dL (16 Dec 2019 12:03)  POCT Blood Glucose.: 154 mg/dL (16 Dec 2019 08:12)    PT/INR - ( 15 Dec 2019 16:21 )   PT: 13.0 sec;   INR: 1.13 ratio    PTT - ( 15 Dec 2019 16:21 )  PTT:31.3 sec    Troponin T, Serum (12.15.19 @ 16:21)    Troponin T, Serum: 0.91 ng/mL    Troponin T, Serum in AM (12.16.19 @ 08:28)    Troponin T, Serum: 0.84 ng/mL    Serum Pro-Brain Natriuretic Peptide (12.15.19 @ 16:21)    Serum Pro-Brain Natriuretic Peptide: >67259 pg/mL    EKG: NSR, No ST elevations    < from: Xray Chest 1 View-PORTABLE IMMEDIATE (12.15.19 @ 17:27) >  FINDINGS:  Prior study dated 7/19/2019 was available for review.  The lungs demonstrate bilateral atelectatic changes at both lung bases.   No pleural effusion is seen. The heart and mediastinum appear intact.    IMPRESSION: Bilateral lower lobe atelectatic changes noted.    EMIGDIO LARA M.D., ATTENDING RADIOLOGIST  This document has been electronically signed. Dec 15 2019  5:45PM  < end of copied text >    < from: CT Abdomen and Pelvis No Cont (12.16.19 @ 08:40) >  FINDINGS:  LOWER CHEST: Small bilateral partially loculated pleural effusions. Near atelectasis or scarring at the lung bases. Blood pool lower in attenuation than the myocardium suggestive of anemia.  LIVER: Nodular surface contour suggestive of cirrhosis.  BILE DUCTS: Normal caliber.  GALLBLADDER: Normal caliber wall. Punctate gallstone.  SPLEEN: Within normal limits.  PANCREAS: Within normal limits.  ADRENALS: Within normal limits.  KIDNEYS/URETERS: Mildly atrophic. No hydronephrosis. Low-attenuation renal lesions, likely cysts.  BLADDER: Distended, limiting evaluation.  REPRODUCTIVE ORGANS: Uterus and adnexa within normal limits.  BOWEL: Evaluation for active gastrointestinal bleeding is limited in the absence of intravenous contrast. No bowel obstruction. No evidence of appendicitis. High density material in the stomach, likely residual oral contrast.  PERITONEUM: Large amount of abdominal and pelvic ascites.  VESSELS: Abdominal aorta normal in caliber.  RETROPERITONEUM/LYMPH NODES: No lymphadenopathy.    ABDOMINAL WALL: Anasarca.  BONES: No aggressive osseous lesion.    IMPRESSION:   Large amount of abdominal and pelvic ascites.  Nodular surface contour of the liver suggestive of cirrhosis; clinical correlation is recommended.  Limited evaluation for active gastrointestinal bleeding in the absence of intravenous contrast.    YODIT CARRANZA   This document has been electronically signed. Dec 16 2019  9:22AM  < end of copied text >    < from: US Gallbladder (12.15.19 @ 20:17) >  FINDINGS:  The gallbladder is incompletely distended with a 4 x 5 mm echogenic nonshadowing lesion likely representing a polyp. The gallbladder wall is difficult to assess in the setting of incomplete distention. The common bile duct is not dilated, measuring 4 mm.  There is a large volume of free abdominal fluid as seen on prior CT of September 21, 2019.    IMPRESSION:  Contracted gallbladder limits evaluation for subtle stones. If strong clinical concern persists, repeat imaging may be obtained after an adequate fasting period of 8 hours.4 mm echogenic focus likely represents a polyp. No significant interval change compared to the previous ultrasound of February 13, 2019.   Recommend continued follow-up with ultrasound in one year.  Large volume of abdominopelvic ascites.    LEONARDO WESLEY M.D, ATTENDING RADIOLOGIST  This document has been electronically signed. Dec 15 2019  9:14PM  < end of copied text >    < from: TTE Echo Complete w/Doppler (12.16.19 @ 11:22) >  PHYSICIAN INTERPRETATION:  Left Ventricle: The left ventricular internal cavity size is normal. Left ventricular wall thickness is normal.  Global LV systolic function was normal. Left ventricular ejection fraction, by visual estimation, is 60 to 65%. Spectral Doppler shows normal pattern of LV diastolic filling.  Right Ventricle: Normal right ventricular size and function.  Left Atrium: Moderately enlarged leftatrium.  Right Atrium: Moderately enlarged right atrium.  Pericardium: There is no evidence of pericardial effusion. Severe ascites is noted. There is a large pleural effusion in both left and right lateral regions.  Mitral Valve: Thickening of the anterior and posterior mitral valve leaflets. Mild mitral valve regurgitation is seen.  Tricuspid Valve: Structurally normal tricuspid valve, with normal leaflet excursion. Mild tricuspid regurgitation is visualized.  Aortic Valve: The aortic valve is trileaflet. Trivial aortic valve regurgitation is seen. Mobile echo density seen on the AoV. Consider Lambl's  Pulmonic Valve: Structurally normal pulmonic valve, with normal leaflet excursion. Trace pulmonic valve regurgitation.  Aorta:The aortic root is normal in size and structure.  Pulmonary Artery: The main pulmonary artery is normal in size.  Venous: A normal flow pattern is recorded from the right upper pulmonary vein. The inferior vena cava was normal sized, with respiratory size variation greater than 50%.  In comparison to the previous echocardiogram(s): Prior examinations are available and were reviewed for comparison purposes.     Summary:   1. Left ventricular ejection fraction, by visual estimation, is 60 to 65%.   2. Normal global left ventricular systolic function.   3. Large pleural effusion in both left and right lateral regions.   4. Severe ascites.   5. There is no evidence of pericardial effusion.   6. Thickening of the anterior and posterior mitral valve leaflets.    MD Joan Electronically signed on 12/16/2019 at 11:58:10 AM  *** Final ***  < end of copied text >    MEDICATIONS  (STANDING):  albumin human 25% IVPB 50 milliLiter(s) IV Intermittent once  calcium carbonate   1250 mG (OsCal) 1 Tablet(s) Oral three times a day  carvedilol 25 milliGRAM(s) Oral every 12 hours  cefTRIAXone   IVPB      dextrose 5%. 1000 milliLiter(s) (50 mL/Hr) IV Continuous <Continuous>  dextrose 50% Injectable 12.5 Gram(s) IV Push once  dextrose 50% Injectable 25 Gram(s) IV Push once  dextrose 50% Injectable 25 Gram(s) IV Push once  epoetin gian Injectable 17839 Unit(s) IV Push <User Schedule>  ferrous    sulfate 325 milliGRAM(s) Oral daily  insulin lispro (HumaLOG) corrective regimen sliding scale   SubCutaneous three times a day before meals  pantoprazole  Injectable 40 milliGRAM(s) IV Push two times a day  saccharomyces boulardii 250 milliGRAM(s) Oral two times a day  sacubitril 49 mG/valsartan 51 mG 1 Tablet(s) Oral <User Schedule>    MEDICATIONS  (PRN):  dextrose 40% Gel 15 Gram(s) Oral once PRN Blood Glucose LESS THAN 70 milliGRAM(s)/deciliter  glucagon  Injectable 1 milliGRAM(s) IntraMuscular once PRN Glucose LESS THAN 70 milligrams/deciliter  ondansetron Injectable 4 milliGRAM(s) IV Push every 6 hours PRN Nausea and/or Vomiting Patient is a 59y old  Female who presents with a chief complaint of chest pain/epigastric pain (16 Dec 2019 14:58) remains fluid overloaded with abd distension     INTERVAL HPI/OVERNIGHT EVENTS:  Patient seen and examined at bedside. No acute events overnight. Patient states she has not had any further episodes of vomiting since hospitalization. Continues with burning epigastric pain, had one episode of dark stool yest. Reports she always throws up and has this abdominal pain when she accumulates a lot of fluid. Understands she will have paracentesis today, HD tomorrow and likely will proceed with EGD. Pt in agreement with the plan of care. Patient denies chest pain, SOB, abd pain, N/V, fever, chills, dysuria or any other complaints. All remainder ROS negative.     ROS Negative except as above.    Vital Signs Last 24 Hrs  T(C): 36.9 (16 Dec 2019 13:38), Max: 37.2 (16 Dec 2019 00:17)  T(F): 98.4 (16 Dec 2019 13:38), Max: 98.9 (16 Dec 2019 00:17)  HR: 69 (16 Dec 2019 13:38) (68 - 76)  BP: 148/77 (16 Dec 2019 13:38) (113/67 - 148/77)  RR: 20 (16 Dec 2019 13:38) (16 - 20)  SpO2: 95% (16 Dec 2019 13:38) (95% - 98%)    Telemetry: NSR, no alarms      I&O's Detail    15 Dec 2019 07:01  -  16 Dec 2019 07:00  --------------------------------------------------------  IN:    Oral Fluid: 90 mL  Total IN: 90 mL    OUT:  Total OUT: 0 mL    Total NET: 90 mL    PHYSICAL EXAM:    General: Elderly weak female, thin WD NAD, ill appearing   HEENT: Normocephalic, atraumatic, clear sclera, PERRLA, EOMI, Clear nares, Dry Mucosas, Normal Pharynx, No exudates, no lesions   Neck: Supple, no carotid bruits, no JVD, No thyromegaly  Respiratory: Normal respiratory rate and effort, decreased breath sounds, prominent crackles/rales in lung bases bilaterally  Cardiac: Regular rate and rhythm, no murmurs, rubs or gallop.  GI: Abdomen is globus, ascitic, waveform elicited, epigastric tenderness on palpation, no rebound or guarding, distant and decreased bowel sounds.  Extremities: AV fistula in LUE + thrill, No cyanosis  Neurological: Patient is alert and oriented x4, CN II-XII grossly intact, no sensory or motor deficits.  Skin: Warm and dry, no abnormal discolorations, no bruises, no bleeding, no lacerations.    LABS:                        8.4    4.33  )-----------( 308      ( 16 Dec 2019 08:28 )             28.4     CBC Full  -  ( 16 Dec 2019 08:28 )  WBC Count : 4.33 K/uL  RBC Count : 3.16 M/uL  Hemoglobin : 8.4 g/dL  Hematocrit : 28.4 %  Platelet Count - Automated : 308 K/uL  Mean Cell Volume : 89.9 fl  Mean Cell Hemoglobin : 26.6 pg  Mean Cell Hemoglobin Concentration : 29.6 gm/dL  Auto Neutrophil # : x  Auto Lymphocyte # : x  Auto Monocyte # : x  Auto Eosinophil # : x  Auto Basophil # : x  Auto Neutrophil % : x  Auto Lymphocyte % : x  Auto Monocyte % : x  Auto Eosinophil % : x  Auto Basophil % : x    12-16    138  |  90<L>  |  26.0<H>  ----------------------------<  154<H>  3.8   |  33.0<H>  |  5.08<H>    Ca    8.1<L>      16 Dec 2019 08:28    TPro  7.1  /  Alb  3.0<L>  /  TBili  0.5  /  DBili  x   /  AST  13  /  ALT  <5  /  AlkPhos  125<H>  12-15    Lactate, Blood: 0.6 mmol/L (12-16 @ 08:28)    CAPILLARY BLOOD GLUCOSE  POCT Blood Glucose.: 103 mg/dL (16 Dec 2019 12:03)  POCT Blood Glucose.: 154 mg/dL (16 Dec 2019 08:12)    PT/INR - ( 15 Dec 2019 16:21 )   PT: 13.0 sec;   INR: 1.13 ratio    PTT - ( 15 Dec 2019 16:21 )  PTT:31.3 sec    Troponin T, Serum (12.15.19 @ 16:21)    Troponin T, Serum: 0.91 ng/mL    Troponin T, Serum in AM (12.16.19 @ 08:28)    Troponin T, Serum: 0.84 ng/mL    Serum Pro-Brain Natriuretic Peptide (12.15.19 @ 16:21)    Serum Pro-Brain Natriuretic Peptide: >22867 pg/mL    EKG: NSR, No ST elevations    < from: Xray Chest 1 View-PORTABLE IMMEDIATE (12.15.19 @ 17:27) >  FINDINGS:  Prior study dated 7/19/2019 was available for review.  The lungs demonstrate bilateral atelectatic changes at both lung bases.   No pleural effusion is seen. The heart and mediastinum appear intact.    IMPRESSION: Bilateral lower lobe atelectatic changes noted.    EMIGDIO LARA M.D., ATTENDING RADIOLOGIST  This document has been electronically signed. Dec 15 2019  5:45PM  < end of copied text >    < from: CT Abdomen and Pelvis No Cont (12.16.19 @ 08:40) >  FINDINGS:  LOWER CHEST: Small bilateral partially loculated pleural effusions. Near atelectasis or scarring at the lung bases. Blood pool lower in attenuation than the myocardium suggestive of anemia.  LIVER: Nodular surface contour suggestive of cirrhosis.  BILE DUCTS: Normal caliber.  GALLBLADDER: Normal caliber wall. Punctate gallstone.  SPLEEN: Within normal limits.  PANCREAS: Within normal limits.  ADRENALS: Within normal limits.  KIDNEYS/URETERS: Mildly atrophic. No hydronephrosis. Low-attenuation renal lesions, likely cysts.  BLADDER: Distended, limiting evaluation.  REPRODUCTIVE ORGANS: Uterus and adnexa within normal limits.  BOWEL: Evaluation for active gastrointestinal bleeding is limited in the absence of intravenous contrast. No bowel obstruction. No evidence of appendicitis. High density material in the stomach, likely residual oral contrast.  PERITONEUM: Large amount of abdominal and pelvic ascites.  VESSELS: Abdominal aorta normal in caliber.  RETROPERITONEUM/LYMPH NODES: No lymphadenopathy.    ABDOMINAL WALL: Anasarca.  BONES: No aggressive osseous lesion.    IMPRESSION:   Large amount of abdominal and pelvic ascites.  Nodular surface contour of the liver suggestive of cirrhosis; clinical correlation is recommended.  Limited evaluation for active gastrointestinal bleeding in the absence of intravenous contrast.    YODIT CARRANZA   This document has been electronically signed. Dec 16 2019  9:22AM  < end of copied text >    < from: US Gallbladder (12.15.19 @ 20:17) >  FINDINGS:  The gallbladder is incompletely distended with a 4 x 5 mm echogenic nonshadowing lesion likely representing a polyp. The gallbladder wall is difficult to assess in the setting of incomplete distention. The common bile duct is not dilated, measuring 4 mm.  There is a large volume of free abdominal fluid as seen on prior CT of September 21, 2019.    IMPRESSION:  Contracted gallbladder limits evaluation for subtle stones. If strong clinical concern persists, repeat imaging may be obtained after an adequate fasting period of 8 hours.4 mm echogenic focus likely represents a polyp. No significant interval change compared to the previous ultrasound of February 13, 2019.   Recommend continued follow-up with ultrasound in one year.  Large volume of abdominopelvic ascites.    LEONARDO WESLEY M.D, ATTENDING RADIOLOGIST  This document has been electronically signed. Dec 15 2019  9:14PM  < end of copied text >    < from: TTE Echo Complete w/Doppler (12.16.19 @ 11:22) >  PHYSICIAN INTERPRETATION:  Left Ventricle: The left ventricular internal cavity size is normal. Left ventricular wall thickness is normal.  Global LV systolic function was normal. Left ventricular ejection fraction, by visual estimation, is 60 to 65%. Spectral Doppler shows normal pattern of LV diastolic filling.  Right Ventricle: Normal right ventricular size and function.  Left Atrium: Moderately enlarged leftatrium.  Right Atrium: Moderately enlarged right atrium.  Pericardium: There is no evidence of pericardial effusion. Severe ascites is noted. There is a large pleural effusion in both left and right lateral regions.  Mitral Valve: Thickening of the anterior and posterior mitral valve leaflets. Mild mitral valve regurgitation is seen.  Tricuspid Valve: Structurally normal tricuspid valve, with normal leaflet excursion. Mild tricuspid regurgitation is visualized.  Aortic Valve: The aortic valve is trileaflet. Trivial aortic valve regurgitation is seen. Mobile echo density seen on the AoV. Consider Lambl's  Pulmonic Valve: Structurally normal pulmonic valve, with normal leaflet excursion. Trace pulmonic valve regurgitation.  Aorta:The aortic root is normal in size and structure.  Pulmonary Artery: The main pulmonary artery is normal in size.  Venous: A normal flow pattern is recorded from the right upper pulmonary vein. The inferior vena cava was normal sized, with respiratory size variation greater than 50%.  In comparison to the previous echocardiogram(s): Prior examinations are available and were reviewed for comparison purposes.     Summary:   1. Left ventricular ejection fraction, by visual estimation, is 60 to 65%.   2. Normal global left ventricular systolic function.   3. Large pleural effusion in both left and right lateral regions.   4. Severe ascites.   5. There is no evidence of pericardial effusion.   6. Thickening of the anterior and posterior mitral valve leaflets.    MD Joan Electronically signed on 12/16/2019 at 11:58:10 AM  *** Final ***  < end of copied text >    MEDICATIONS  (STANDING):  albumin human 25% IVPB 50 milliLiter(s) IV Intermittent once  calcium carbonate   1250 mG (OsCal) 1 Tablet(s) Oral three times a day  carvedilol 25 milliGRAM(s) Oral every 12 hours  cefTRIAXone   IVPB      dextrose 5%. 1000 milliLiter(s) (50 mL/Hr) IV Continuous <Continuous>  dextrose 50% Injectable 12.5 Gram(s) IV Push once  dextrose 50% Injectable 25 Gram(s) IV Push once  dextrose 50% Injectable 25 Gram(s) IV Push once  epoetin gian Injectable 23425 Unit(s) IV Push <User Schedule>  ferrous    sulfate 325 milliGRAM(s) Oral daily  insulin lispro (HumaLOG) corrective regimen sliding scale   SubCutaneous three times a day before meals  pantoprazole  Injectable 40 milliGRAM(s) IV Push two times a day  saccharomyces boulardii 250 milliGRAM(s) Oral two times a day  sacubitril 49 mG/valsartan 51 mG 1 Tablet(s) Oral <User Schedule>    MEDICATIONS  (PRN):  dextrose 40% Gel 15 Gram(s) Oral once PRN Blood Glucose LESS THAN 70 milliGRAM(s)/deciliter  glucagon  Injectable 1 milliGRAM(s) IntraMuscular once PRN Glucose LESS THAN 70 milligrams/deciliter  ondansetron Injectable 4 milliGRAM(s) IV Push every 6 hours PRN Nausea and/or Vomiting

## 2019-12-16 NOTE — DISCHARGE NOTE PROVIDER - NSDCMRMEDTOKEN_GEN_ALL_CORE_FT
aspirin 81 mg oral delayed release tablet: 1 tab(s) orally once a day  calcitriol 0.25 mcg oral capsule: 1 cap(s) orally once a day  calcium carbonate 1250 mg (500 mg elemental calcium) oral tablet: 1 tab(s) orally 3 times a day  Colace 100 mg oral capsule: 1 cap(s) orally 2 times a day, As Needed -for constipation   Coreg 25 mg oral tablet: 0.5 tab(s) orally 2 times a day  Entresto 49 mg-51 mg oral tablet: 1 tab(s) orally once a day  FeroSul 325 mg (65 mg elemental iron) oral tablet: 1 tab(s) orally once a day   HumaLOG 100 units/mL subcutaneous solution: 2 unit(s) subcutaneous 2 times a day (with meals)  insulin glargine 100 units/mL subcutaneous solution: 4 unit(s) subcutaneous once a day (at bedtime)  pantoprazole 40 mg oral delayed release tablet: 1 tab(s) orally once a day  senna oral tablet: 2 tab(s) orally once a day (at bedtime), As needed, Constipation aspirin 81 mg oral delayed release tablet: 1 tab(s) orally once a day  calcitriol 0.25 mcg oral capsule: 1 cap(s) orally once a day  calcium carbonate 1250 mg (500 mg elemental calcium) oral tablet: 1 tab(s) orally 3 times a day  Colace 100 mg oral capsule: 1 cap(s) orally 2 times a day, As Needed -for constipation   Coreg 25 mg oral tablet: 0.5 tab(s) orally 2 times a day  Entresto 49 mg-51 mg oral tablet: 1 tab(s) orally once a day  FeroSul 325 mg (65 mg elemental iron) oral tablet: 1 tab(s) orally once a day   HumaLOG 100 units/mL subcutaneous solution: 2 unit(s) subcutaneous 2 times a day (with meals)  insulin glargine 100 units/mL subcutaneous solution: 4 unit(s) subcutaneous once a day (at bedtime)  senna oral tablet: 2 tab(s) orally once a day (at bedtime), As needed, Constipation aspirin 81 mg oral delayed release tablet: 1 tab(s) orally once a day  calcitriol 0.25 mcg oral capsule: 1 cap(s) orally once a day  calcium carbonate 1250 mg (500 mg elemental calcium) oral tablet: 1 tab(s) orally 3 times a day  Colace 100 mg oral capsule: 1 cap(s) orally 2 times a day, As Needed -for constipation   Coreg 25 mg oral tablet: 0.5 tab(s) orally 2 times a day  Entresto 49 mg-51 mg oral tablet: 1 tab(s) orally once a day  FeroSul 325 mg (65 mg elemental iron) oral tablet: 1 tab(s) orally once a day   HumaLOG 100 units/mL subcutaneous solution: 2 unit(s) subcutaneous 2 times a day (with meals)    per scale   insulin glargine 100 units/mL subcutaneous solution: 4 unit(s) subcutaneous once a day (at bedtime)    per fingerstick and as per home dose   pantoprazole 40 mg oral delayed release tablet: 1 tab(s) orally 2 times a day   senna oral tablet: 2 tab(s) orally once a day (at bedtime), As needed, Constipation

## 2019-12-16 NOTE — DISCHARGE NOTE PROVIDER - INSTRUCTIONS
diet with renal restrictions Follow a heart healthy diet. And make sure to limit the salt (sodium) in your diet. Max 2 grams of salt daily. Salt causes your body to hold water. This makes your heart work harder as there is more fluid for the heart to pump. Limit your salt by doing the following:  Limit canned, dried, packaged, and fast foods, don't add salt to your food, season foods with herbs instead of salt. Watch how much liquids you drink, drinking too much can make heart failure worse. Do not drink more than 1 L of fluid per day. Limit the amount of alcohol you drink. It may harm your heart, women should have no more than 1 drink a day and men should have no more than 2.  When you eat out, request that your meals have no added salt.

## 2019-12-16 NOTE — DISCHARGE NOTE PROVIDER - PROVIDER TOKENS
PROVIDER:[TOKEN:[[id number]]],PROVIDER:[TOKEN:[[id number]]],PROVIDER:[TOKEN:[[id number]]]

## 2019-12-16 NOTE — DISCHARGE NOTE PROVIDER - NSDCFUADDINST_GEN_ALL_CORE_FT
Please follow up with your cardiology , nephrology and gastroenterology doctor   Take all your mediations as prescribe . -Be sure to continue care with your Primary Care Doctor, Gastroenterology. You should call to make an appointment for hospital follow up within 7 days, be sure to tell them that you were just released from [facility] when making your appointment. Contact information for a suggested provider is available on this paperwork.    -Continue with all medications as prescribed.    -If symptoms return, become worse, and/or if new symptoms appear please seek medical attention immediately with Primary Care Physician and/or Emergency Department (as you may deem appropriate).

## 2019-12-16 NOTE — DISCHARGE NOTE PROVIDER - NSDCCPCAREPLAN_GEN_ALL_CORE_FT
PRINCIPAL DISCHARGE DIAGNOSIS  Diagnosis: Chest pain due to CAD  Assessment and Plan of Treatment: Your evaluattion by cardiology was negative for any acute cardiovascular condition . You need to return to the ER in case of chest pain .   Please follow up with you primary care and cardiology      SECONDARY DISCHARGE DIAGNOSES  Diagnosis: Coffee ground emesis  Assessment and Plan of Treatment: You have coffee ground emesis  which is most likely secondary to gastritis, you were for EGD showing ...............  Please follow up with gastroenterology    Diagnosis: Type 2 diabetes mellitus with other specified complication, with long-term current use of insulin  Assessment and Plan of Treatment: Please continue treatment with your home medications    Diagnosis: Other ascites  Assessment and Plan of Treatment: You have a paracentesis on 12/...../19 removing ......lits   Please follow up with gastroenterology PRINCIPAL DISCHARGE DIAGNOSIS  Diagnosis: Chest pain due to CAD  Assessment and Plan of Treatment: Your evaluattion by cardiology was negative for any acute cardiovascular condition . You need to return to the ER in case of chest pain .   Please follow up with you primary care and cardiology      SECONDARY DISCHARGE DIAGNOSES  Diagnosis: Type 2 diabetes mellitus with other specified complication, with long-term current use of insulin  Assessment and Plan of Treatment: Please continue treatment with your home medications    Diagnosis: Other ascites  Assessment and Plan of Treatment: You have a paracentesis on 16/Dec/19 removing 10 liters of fluid. Please do not drink more than 1 lt of fluid a day and reduce your daily salt intake to 2 grams. If you exceed this limits of fluids and salt, you may run a high risk of accumulating more fluid. Please return to ED if your symptoms return.   Please follow up with gastroenterology    Diagnosis: Coffee ground emesis  Assessment and Plan of Treatment: You have coffee ground emesis  which is most likely secondary to gastritis, you were for EGD showing shows erosive esophagitis and mild gastroduodenitis. Recommended to have PPI BID x8 weeks and to follow up with pathology as outpt with gastro.  Please follow up with gastroenterology. PRINCIPAL DISCHARGE DIAGNOSIS  Diagnosis: Chest pain due to CAD  Assessment and Plan of Treatment: Your evaluattion by cardiology was negative for any acute cardiovascular condition . likely pain was related to fluid overload from ascites. You had removal of the fluid with improvement in symptoms.   Please follow up with you primary care and cardiologist in one week.      SECONDARY DISCHARGE DIAGNOSES  Diagnosis: Coffee ground emesis  Assessment and Plan of Treatment: You have coffee ground emesis  which is most likely secondary to gastritis, you were for EGD showing shows erosive esophagitis and mild gastroduodenitis. Recommended to continue with protonic therapy x8 weeks and to follow up with pathology as outpt with gastro.  Please follow up with gastroenterology in one week.    Diagnosis: Other ascites  Assessment and Plan of Treatment: You have a paracentesis on 16/Dec/19 removing 10 liters of fluid. Please do not drink more than 1 lt of fluid a day and reduce your daily salt intake to 2 grams. If you exceed this limits of fluids and salt, you may run a high risk of accumulating more fluid. Please return to ED if your symptoms return.   Please follow up with gastroenterology and set up for outpatient paracentesis to prevent future hospitalizations. Also continue with off loading fluid with hemodialysis.    Diagnosis: ESRD on dialysis  Assessment and Plan of Treatment: Continue with regularly scheduled hemodialysis. Please follow up with nephrologist with scheduled hemodialysis.    Diagnosis: PAF (paroxysmal atrial fibrillation)  Assessment and Plan of Treatment: Please continue with home medications.    Diagnosis: Type 2 diabetes mellitus with other specified complication, with long-term current use of insulin  Assessment and Plan of Treatment: Please continue treatment with your home medications

## 2019-12-16 NOTE — CONSULT NOTE ADULT - CONSULT REASON
ESRD , Missed HD
Coffee ground emesis / Large volume ascites
chest pain
chest pain/epigastric pain  GI bleed

## 2019-12-16 NOTE — CONSULT NOTE ADULT - SUBJECTIVE AND OBJECTIVE BOX
Patient is a 59y old  Female who presents with a chief complaint of chest pain/epigastric pain (16 Dec 2019 09:13)      HPI:  59 yr old female w ESRD on HD M-W-, DM II, PAF on no AC due to GI bleed, HFrEF 35-40%, nonobstructive CAD, pericarditis s/p pericardiocentesis, recurrent ascites s/p multiple paracentesis last one 1month ago at [facility] presented to ED c/o chest pain/epigastric pain with associated nausea and brown color vomitus, pain is 5/10 with radiation to the back. Pt states that she is s/p repair of umbilical hernia at [facility] 2019. Placed on doxycycline for cellulitis.  Also had 7 L ascitic fluid removed at time of surgery and has been doing well since. Denies sob, palpitations, fever, chills. (15 Dec 2019 22:42)    GI and Cardio consults noted in ER   Large ascites on CT abdomen   Paracentesis to be arranged and then eventual EGD pending Cardio clearance   Started on Ceftriaxone to cover possible SBP  She is comfortable in ER   Cardiac enzymes (-) trending  pattern and ECHO w/o change       PAST MEDICAL & SURGICAL HISTORY:  Coronary artery disease, angina presence unspecified, unspecified vessel or lesion type, unspecified whether native or transplanted heart  Paroxysmal atrial fibrillation  Anemia due to chronic kidney disease, unspecified CKD stage  Chronic systolic congestive heart failure  Pleural effusion  Pericardial effusion  End stage renal disease on dialysis  HLD (hyperlipidemia)  HTN (hypertension)  DM (diabetes mellitus)  A-V fistula  Encounter for dialysis catheter care      FAMILY HISTORY:  Family history of kidney disease in brother (Sibling)      Social History:    MEDICATIONS  (STANDING):  calcium carbonate   1250 mG (OsCal) 1 Tablet(s) Oral three times a day  carvedilol 25 milliGRAM(s) Oral every 12 hours  cefTRIAXone   IVPB      dextrose 5%. 1000 milliLiter(s) (50 mL/Hr) IV Continuous <Continuous>  dextrose 50% Injectable 12.5 Gram(s) IV Push once  dextrose 50% Injectable 25 Gram(s) IV Push once  dextrose 50% Injectable 25 Gram(s) IV Push once  ferrous    sulfate 325 milliGRAM(s) Oral daily  insulin lispro (HumaLOG) corrective regimen sliding scale   SubCutaneous three times a day before meals  pantoprazole  Injectable 40 milliGRAM(s) IV Push two times a day  saccharomyces boulardii 250 milliGRAM(s) Oral two times a day  sacubitril 49 mG/valsartan 51 mG 1 Tablet(s) Oral <User Schedule>    MEDICATIONS  (PRN):  dextrose 40% Gel 15 Gram(s) Oral once PRN Blood Glucose LESS THAN 70 milliGRAM(s)/deciliter  glucagon  Injectable 1 milliGRAM(s) IntraMuscular once PRN Glucose LESS THAN 70 milligrams/deciliter  ondansetron Injectable 4 milliGRAM(s) IV Push every 6 hours PRN Nausea and/or Vomiting      Allergies    No Known Allergies    Intolerances        Vital Signs Last 24 Hrs  T(C): 36.9 (16 Dec 2019 13:38), Max: 37.2 (16 Dec 2019 00:17)  T(F): 98.4 (16 Dec 2019 13:38), Max: 98.9 (16 Dec 2019 00:17)  HR: 69 (16 Dec 2019 13:38) (68 - 76)  BP: 148/77 (16 Dec 2019 13:38) (113/67 - 148/77)  BP(mean): --  RR: 20 (16 Dec 2019 13:38) (16 - 20)  SpO2: 95% (16 Dec 2019 13:38) (95% - 99%)  Daily     Daily   I&O's Detail    15 Dec 2019 07:  -  16 Dec 2019 07:00  --------------------------------------------------------  IN:    Oral Fluid: 90 mL  Total IN: 90 mL    OUT:  Total OUT: 0 mL    Total NET: 90 mL        I&O's Summary    15 Dec 2019 07:01  -  16 Dec 2019 07:00  --------------------------------------------------------  IN: 90 mL / OUT: 0 mL / NET: 90 mL        PHYSICAL EXAM:  HEAD:  NCAT  NECK: Supple, +JVD  NERVOUS SYSTEM:  Alert & Oriented X3  CHEST/LUNG:EAE , clear   HEART: Regular rate and rhythm; No rub  ABDOMEN: Soft, Nontender,distended  + ascites   EXTREMITIES: decreased edema , Access w/ thrill       LABS:                        8.4    4.33  )-----------( 308      ( 16 Dec 2019 08:28 )             28.4     12    138  |  90<L>  |  26.0<H>  ----------------------------<  154<H>  3.8   |  33.0<H>  |  5.08<H>    Ca    8.1<L>      16 Dec 2019 08:28    TPro  7.1  /  Alb  3.0<L>  /  TBili  0.5  /  DBili  x   /  AST  13  /  ALT  <5  /  AlkPhos  125<H>  12-15    PT/INR - ( 15 Dec 2019 16:21 )   PT: 13.0 sec;   INR: 1.13 ratio         PTT - ( 15 Dec 2019 16:21 )  PTT:31.3 sec        AM:  CT ABDOMEN AND PELVIS                          PROCEDURE DATE:  2019          INTERPRETATION:  CLINICAL INFORMATION: Coffee ground emesis. Abdominal   pain. Ascites.    COMPARISON: Gallbladder ultrasound 12/15/2019 and CT abdomen/pelvis   2019    PROCEDURE:   CT of the Abdomen and Pelvis was performed without intravenous contrast.   Intravenous contrast: None.  Oral contrast: None.  Sagittal and coronal reformats were performed.    FINDINGS:    LOWER CHEST: Small bilateral partially loculated pleural effusions. Near   atelectasis or scarring at the lung bases. Blood pool lower in   attenuation than the myocardium suggestive of anemia.    LIVER: Nodular surface contour suggestive of cirrhosis.  BILE DUCTS: Normal caliber.  GALLBLADDER: Normal caliber wall. Punctate gallstone.  SPLEEN: Within normal limits.  PANCREAS: Within normal limits.  ADRENALS: Within normal limits.  KIDNEYS/URETERS: Mildly atrophic. No hydronephrosis. Low-attenuation   renal lesions, likely cysts.    BLADDER: Distended, limiting evaluation.  REPRODUCTIVE ORGANS: Uterus and adnexa within normal limits.    BOWEL: Evaluation for active gastrointestinal bleeding is limited in the   absence of intravenous contrast. No bowel obstruction. No evidence of   appendicitis. High density material in the stomach, likely residual oral   contrast.  PERITONEUM: Large amount of abdominal and pelvic ascites.  VESSELS: Abdominal aorta normal in caliber.  RETROPERITONEUM/LYMPH NODES: No lymphadenopathy.    ABDOMINAL WALL: Anasarca.  BONES: No aggressive osseous lesion.    IMPRESSION:     Large amount of abdominal and pelvic ascites.    Nodular surface contour of the liver suggestive of cirrhosis; clinical   correlation is recommended.    Limited evaluation for active gastrointestinal bleeding in the absence of   intravenous contrast.                YODIT CARRANZA   This document has been electronically signed. Dec 16 2019  9:22AM                  EXAM:  ECHO TRANSTHORACIC COMP W DOPP      PROCEDURE DATE:  Dec 16 2019   .      INTERPRETATION:  REPORT:    TRANSTHORACIC ECHOCARDIOGRAM REPORT         Patient Name:   JAROCHO MORALES Patient Location: Emergency  Medical Rec #:  [id number]     Accession #:      [id number]  Account #:                   Height:           65.0 in 165.0 cm  YOB: 1960     Weight:           149.9 lb 68.00 kg  Patient Age:    59 years     BSA:              1.75 m²  Patient Gender: F            BP:  122/64 mmHg       Date of Exam:        2019 11:22:56 AM  Sonographer:         Alexandra Thompson  Referring Physician: Elio Hernandes MD    Procedure:   2D Echo/Doppler/Color Doppler Complete.  Indications: Nonrheumatic mitral (valve) insufficiency -I34.0  Diagnosis:   Nonrheumatic mitral (valve) insufficiency - I34.0         2D AND M-MODE MEASUREMENTS (normal ranges within parentheses):  Left                 Normal   Aorta/Left            Normal  Ventricle:                    Atrium:  IVSd (2D):    1.11  (0.7-1.1) Aortic Root  2.94 cm (2.4-3.7)                 cm             (2D):  LVPWd (2D):   1.08  (0.7-1.1) Left Atrium  4.86 cm (1.9-4.0)                 cm             (2D):  LVIDd (2D):   4.74  (3.4-5.7) LA Volume     44.8    cm             Index         ml/m²  LVIDs (2D):   3.36                 cm  LV FS (2D):   29.1   (>25%)                  %  LV EF (2D):   56 %   (>55%)  Relative Wall 0.46   (<0.42)  Thickness    LV SYSTOLIC FUNCTION BY 2D PLANIMETRY (MOD):  EF-A4C View: 66.9 % EF-A2C View: 66.2 % EF-Biplane: 67 %    LV DIASTOLIC FUNCTION:  MV Peak E: 1.08 m/s e', MV Josselin: 0.06 m/s  MV Peak A: 0.84 m/s E/e' Ratio: 17.31  E/A Ratio: 1.28    SPECTRAL DOPPLER ANALYSIS (where applicable):  Aortic Valve: AoV Max Darius: 1.86 m/s AoV Peak P.9 mmHg AoV Mean P.4 mmHg    LVOT Vmax: 1.10 m/s LVOT VTI: 0.245 m LVOT Diameter: 2.06 cm    AoV Area, Vmax: 1.96 cm² AoV Area, VTI: 2.33 cm² AoV Area, Vmn: 2.11 cm²  Ao VTI: 0.350  Tricuspid Valve and PA/RV Systolic Pressure: TR Max Velocity: 2.82 m/s RA   Pressure: 3 mmHg RVSP/PASP: 34.8 mmHg       PHYSICIAN INTERPRETATION:  Left Ventricle: The left ventricular internal cavity size is normal. Left   ventricular wall thickness is normal.  Global LV systolic function was normal. Left ventricular ejection   fraction, by visual estimation, is 60 to 65%. Spectral Doppler shows   normal pattern of LV diastolic filling.  Right Ventricle: Normal right ventricular size and function.  Left Atrium: Moderately enlarged leftatrium.  Right Atrium: Moderately enlarged right atrium.  Pericardium: There is no evidence of pericardial effusion. Severe ascites   is noted. There is a large pleural effusion in both left and right   lateral regions.  Mitral Valve: Thickening of the anterior and posterior mitral valve   leaflets. Mild mitral valve regurgitation is seen.  Tricuspid Valve: Structurally normal tricuspid valve, with normal leaflet   excursion. Mild tricuspid regurgitation is visualized.  Aortic Valve: The aortic valve is trileaflet. Trivial aortic valve   regurgitation is seen. Mobile echo density seen on the AoV. Consider   Lambl's  Pulmonic Valve: Structurally normal pulmonic valve, with normal leaflet   excursion. Trace pulmonic valve regurgitation.  Aorta:The aortic root is normal in size and structure.  Pulmonary Artery: The main pulmonary artery is normal in size.  Venous: A normal flow pattern is recorded from the right upper pulmonary   vein. The inferior vena cava was normal sized, with respiratory size   variation greater than 50%.  In comparison to the previous echocardiogram(s): Prior examinations are   available and were reviewed for comparison purposes.       Summary:   1. Left ventricular ejection fraction, by visual estimation, is 60 to   65%.   2. Normal global left ventricular systolic function.   3. Large pleural effusion in both left and right lateral regions.   4. Severe ascites.   5. There is no evidence of pericardial effusion.   6. Thickening of the anterior and posterior mitral valve leaflets.    GabrielWagman, MD Electronically signed on 2019 at 11:58:10 AM              *** Final ***

## 2019-12-16 NOTE — DISCHARGE NOTE PROVIDER - CARE PROVIDERS DIRECT ADDRESSES
,DirectAddress_Unknown,[email or web address],DirectAddress_Unknown

## 2019-12-16 NOTE — CONSULT NOTE ADULT - PROBLEM SELECTOR RECOMMENDATION 4
See above. Possible EGD tomorrow. Cardiology evaluation and clearance for EGD. IV Pantoprazole. Clear liquids OK if tolerated. IV Zofran PRN N/V. Repeat labs ordered.

## 2019-12-16 NOTE — PROGRESS NOTE ADULT - ASSESSMENT
59 year old with history of ESRD - HD MWF, HTN, PAF, DM, presents with complaint of nausea and vomiting followed by chest discomfort at rest. Denies recent exertional symptoms. Has not missed HD sessions. LHC 3/23/18 showed normal coronaries.   She does complain of distended abdomen. Has prior history of pericardial effusion.     1. chest pain: atypical, preceded by nausea and vomiting. Normal LHC March 2018. Marginal troponin elevation is chronic, due to CKD  2. history of pericardial effusion  3. ascites: may require further fluid removal via HD    Recommendations:    1. F/u TTE  2. R/o ACS with serial troponins  3. Ascites suggests she may need more fluid removal during HD 59 year old with history of ESRD - HD MWF, HTN, PAF, DM, presents with complaint of nausea and vomiting followed by chest discomfort at rest. Denies recent exertional symptoms. Has not missed HD sessions. LHC 3/23/18 showed normal coronaries.   She does complain of distended abdomen. Has prior history of pericardial effusion.     1. chest pain: atypical, preceded by nausea and vomiting. Normal LHC March 2018. Marginal troponin elevation is chronic, due to CKD; cardiac enzymes "flat" and not consistent with ACS   2. history of pericardial effusion  3. ascites: may require further fluid removal via HD  4. 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 Continue regular cardiac regimen  2. Agree with volume removal via HD

## 2019-12-16 NOTE — CONSULT NOTE ADULT - PROBLEM SELECTOR RECOMMENDATION 2
Not hepatic likely secondary to chronic fluid overload. Will order paracentesis, Would Rx with antibiotics prophylactically for possible SBP pending paracentesis.

## 2019-12-16 NOTE — CONSULT NOTE ADULT - SUBJECTIVE AND OBJECTIVE BOX
Patient is a 59y old  Female who presents with a chief complaint of chest pain/epigastric pain (15 Dec 2019 22:42)      HPI:  59 yr old female w ESRD on HD M-W-F, DM II, PAF on no AC due to GI bleed, HFrEF 35-40%, nonobstructive CAD, pericarditis s/p pericardiocentesis, recurrent ascites s/p multiple paracentesis last one 1month ago at [facility] presented to ED c/o chest pain/epigastric pain with associated nausea and brown color vomitus, pain is 5/10 with radiation to the back. Pt states that she is s/p repair of umbilical hernia at [facility] 11-. Placed on doxycycline for cellulitis.  Also had 7 L ascitic fluid removed at time of surgery and has been doing well since. Denies sob, palpitations, fever, chills. (15 Dec 2019 22:42). Pt has a h/o chronic ascites not secondary to cirrhosis apparently secondary to ESRD and/or cardiomyopathy with chronic CHF. Pt does not remember if and when she ever had an EGD but believes that she last had a colonoscopy done roughly two years ago. She has a h/o ESRD on HD M/W/F.      REVIEW OF SYSTEMS:  Constitutional: No fever, weight loss or fatigue  ENMT:  No difficulty hearing, tinnitus, vertigo; No sinus or throat pain  Respiratory: No cough, wheezing, chills or hemoptysis  Cardiovascular: No chest pain, palpitations, dizziness or leg swelling  Gastrointestinal: As per HPI.  Skin: No itching, burning, rashes or lesions   Musculoskeletal: No joint pain or swelling; No muscle, back or extremity pain  Patient has no cardiopulmonary, peripheral vascular, musculoskeletal, dermatological, neurological, gynecological or psychological symptoms or complaints at this time.    PAST MEDICAL & SURGICAL HISTORY:  Coronary artery disease, angina presence unspecified, unspecified vessel or lesion type, unspecified whether native or transplanted heart  Paroxysmal atrial fibrillation  Anemia due to chronic kidney disease, unspecified CKD stage  Chronic systolic congestive heart failure  Pleural effusion  Pericardial effusion  End stage renal disease on dialysis  HLD (hyperlipidemia)  HTN (hypertension)  DM (diabetes mellitus)  A-V fistula  Ascites      FAMILY HISTORY:  Family history of kidney disease in brother (Sibling)      SOCIAL HISTORY:  Smoking Status: [ ] Current, [ ] Former, [x ] Never  Pack Years: N/A. No ETOH or drug abuse history.    MEDICATIONS:  MEDICATIONS  (STANDING):  calcium carbonate   1250 mG (OsCal) 1 Tablet(s) Oral three times a day  carvedilol 25 milliGRAM(s) Oral every 12 hours  dextrose 5%. 1000 milliLiter(s) (50 mL/Hr) IV Continuous <Continuous>  dextrose 50% Injectable 12.5 Gram(s) IV Push once  dextrose 50% Injectable 25 Gram(s) IV Push once  dextrose 50% Injectable 25 Gram(s) IV Push once  insulin lispro (HumaLOG) corrective regimen sliding scale   SubCutaneous three times a day before meals  pantoprazole  Injectable 40 milliGRAM(s) IV Push two times a day  sacubitril 49 mG/valsartan 51 mG 1 Tablet(s) Oral <User Schedule>    MEDICATIONS  (PRN):  dextrose 40% Gel 15 Gram(s) Oral once PRN Blood Glucose LESS THAN 70 milliGRAM(s)/deciliter  glucagon  Injectable 1 milliGRAM(s) IntraMuscular once PRN Glucose LESS THAN 70 milligrams/deciliter  morphine  - Injectable 2 milliGRAM(s) IV Push every 5 minutes PRN Chest Pain  ondansetron    Tablet 4 milliGRAM(s) Oral every 6 hours PRN Nausea and/or Vomiting      Allergies    No Known Allergies    Intolerances        Vital Signs Last 24 Hrs  T(C): 37.2 (16 Dec 2019 05:04), Max: 37.2 (16 Dec 2019 00:17)  T(F): 98.9 (16 Dec 2019 05:04), Max: 98.9 (16 Dec 2019 00:17)  HR: 68 (16 Dec 2019 05:04) (68 - 76)  BP: 122/64 (16 Dec 2019 05:04) (113/67 - 127/68)  BP(mean): --  RR: 18 (16 Dec 2019 05:04) (16 - 18)  SpO2: 96% (16 Dec 2019 05:04) (95% - 99%)    12-15 @ 07:01  -  12-16 @ 07:00  --------------------------------------------------------  IN: 90 mL / OUT: 0 mL / NET: 90 mL          PHYSICAL EXAM:    General: Well developed; well nourished; in no acute distress  HEENT: MMM, conjunctiva pink and sclera anicteric.  Lungs: Decreased breath sounds bilaterally  Cor: RRR S1, S2 only  Gastrointestinal: Softy distended, with mild diffuse tenderness. Normal bowel sounds; No rebound or guarding or HSM.  REGULO: Pt. refused.  Extremities: Normal range of motion. + A-V fistula in LUE. + peripheral edema lower extremities.  Neurological: Alert and oriented x3  Skin: Warm and dry. No obvious rash      LABS:                        8.1    4.65  )-----------( 304      ( 16 Dec 2019 01:54 )             27.5     12-15    139  |  89<L>  |  23.0<H>  ----------------------------<  122<H>  3.8   |  33.0<H>  |  4.19<H>    Ca    8.7      15 Dec 2019 16:21    TPro  7.1  /  Alb  3.0<L>  /  TBili  0.5  /  DBili  x   /  AST  13  /  ALT  <5  /  AlkPhos  125<H>  12-15          RADIOLOGY & ADDITIONAL STUDIES:   Abdominal U/S: + GB polyp + large volume ascites

## 2019-12-16 NOTE — CONSULT NOTE ADULT - PROBLEM SELECTOR RECOMMENDATION 9
With coffee ground emesis possibly secondary to stress induced gastritis. Possible EGD tomorrow after HD today. IV Pantoprazole 40 mg. BID. Clear liquid diet OK. Cardiac clearance for EGD is required.

## 2019-12-16 NOTE — DISCHARGE NOTE PROVIDER - HOSPITAL COURSE
59 yr old female w ESRD on HD M-W-F, DM II, PAF on no AC due to GI bleed, HFrEF 35-40%, nonobstructive CAD, pericarditis s/p pericardiocentesis, recurrent ascites s/p multiple paracentesis last one 1month ago at [facility] presented to ED c/o chest pain/epigastric pain with associated nausea and brown color vomitus, admitted for possible GIB, elevated troponin r/o ACS. Pt evaluated by cardiology, elevated troponin 2/2 to ESRD , ECG w/o any new changes. Pt evaluated by GI who recommend EGD , pt cleared by cardio . US shows large abdominal ascitics 2/2 to fluid overload and CHFrEF, prior paracentesis was on 11/13/19 7 lit at [facility] prior to umbilical hernia repair . EGD shows .......................... H/H was stable during hospital course.    At this time pt is medical optimal to be discharge . All electrolytes abnormalities were replaced .                 Vital signs                             Physical Exam 59 yr old female w ESRD on HD M-W-F, DM II, PAF on no AC due to GI bleed, HFrEF 35-40%, nonobstructive CAD, pericarditis s/p pericardiocentesis, recurrent ascites s/p multiple paracentesis last one 1month ago at [facility] presented to ED c/o chest pain/epigastric pain with associated nausea and brown color vomitus, admitted for possible GIB, elevated troponin r/o ACS. Pt evaluated by cardiology, elevated troponin 2/2 to ESRD/fluid overload, ECG w/o any new changes. US shows large abdominal ascitics 2/2 to fluid overload and CHFrEF, prior paracentesis was on 11/13/19 (7 lit) at [facility] prior to umbilical hernia repair. Underwent large volume paracentesis with removal of 10 liters of fluid on 12/16. Pt tolerated procedure well with decrease of symptoms.         Pt evaluated by GI who recommend EGD , pt cleared by cardio . EGD shows erosive esophagitis and mild gastroduodenitis. Recommended to have PPI BID x8 weeks and to follow up with pathology as outpt with gastro. H/H was stable during hospital course.        Patient is medically optimized for discharge home & will follow up with primary care physician, gastroenterology.         All electrolyte abnormalities were monitored carefully and repleted as necessary during this hospitalization. At the time of discharge patient was hemodynamically stable and amenable to all terms of discharge. The patient has received verbal instructions from myself regarding discharge plans. Anticipatory guidance provided        Minutes spent: 45 mins            Vital signs     Vital Signs Last 24 Hrs    T(C): 36.6 (18 Dec 2019 13:30), Max: 36.8 (17 Dec 2019 23:30)    T(F): 97.9 (18 Dec 2019 13:30), Max: 98.2 (17 Dec 2019 23:30)    HR: 71 (18 Dec 2019 13:30) (61 - 71)    BP: 105/59 (18 Dec 2019 13:30) (105/59 - 146/62)    RR: 18 (18 Dec 2019 07:42) (16 - 18)    SpO2: 99% (18 Dec 2019 13:30) (93% - 99%)        Physical Exam         PHYSICAL EXAM:    General: Elderly weak female, thin WD NAD, ill appearing     HEENT: Normocephalic, atraumatic, clear sclera, PERRLA, EOMI, Clear nares, Moist Mucosas, Normal Pharynx, No exudates, no lesions     Neck: Supple, no carotid bruits, no JVD, No thyromegaly    Respiratory: Normal respiratory rate and effort, decreased breath sounds, prominent crackles/rales in lung bases bilaterally    Cardiac: Regular rate and rhythm, no murmurs, rubs or gallop.    GI: soft nt, ascitic fluid reduced in amount, site of paracentesis with slight tenderness, dried blood     Extremities: AV fistula in LUE + thrill, No cyanosis    Neurological: Patient is alert and oriented x4, CN II-XII grossly intact, no sensory or motor deficits.    Skin: Warm and dry, no abnormal discolorations, no bruises, no bleeding, no lacerations. 59 year old female w hx of ESRD on HD M-W-F L AVF, DM II, PAF on no AC due to GI bleed, chronic systolic HFrEF 35-40%, nonobstructive CAD, pericarditis s/p pericardiocentesis, recurrent ascites s/p multiple paracentesis prior 1month ago at [facility], s/p umbilical hernia repair on 11/13 who currently presented to the ED with worsening abdominal distension, nausea/ vomiting of coffee ground material with sequela of epigastric pain. Noted to be grossly volume overloaded with large amount ascites on exam/ imaging; likely pressure from the amount of fluid causing the patient to have increased emesis. S/p large volume paracentesis with removal of 10 liters of fluid on 12/16. Hospital course complicated by elevated troponin in the setting of esrd/ fluid overload with acute combined HF exacerbation. Pt continued with off loading fluid with HD.  Cleared by cardio and needed no additional work up also cleared for GI to continue with EGD for coffee ground emesis. EGD showed erosive esophagitis and mild gastroduodenitis. Recommended to have PPI BID x8 weeks and to follow up with pathology as outpt with gastro. H/H was stable during hospital course. Pt cleared by GI/ cardio for dispo. Home care provided given pt is known for recurrent hospitalizations. Recommended to establish care with IR for frequent paracentesis as an outpatient instead of being admitted for fluid removal for the future.         Patient is medically optimized for discharge home & will follow up with primary care physician, gastroenterology.         All electrolyte abnormalities were monitored carefully and repleted as necessary during this hospitalization. At the time of discharge patient was hemodynamically stable and amenable to all terms of discharge. The patient has received verbal instructions from myself regarding discharge plans. Anticipatory guidance provided        Minutes spent: 45 mins            Vital signs     Vital Signs Last 24 Hrs    T(C): 36.6 (18 Dec 2019 13:30), Max: 36.8 (17 Dec 2019 23:30)    T(F): 97.9 (18 Dec 2019 13:30), Max: 98.2 (17 Dec 2019 23:30)    HR: 71 (18 Dec 2019 13:30) (61 - 71)    BP: 105/59 (18 Dec 2019 13:30) (105/59 - 146/62)    RR: 18 (18 Dec 2019 07:42) (16 - 18)    SpO2: 99% (18 Dec 2019 13:30) (93% - 99%)        Physical Exam         PHYSICAL EXAM:    General: Elderly weak female, thin WD NAD, ill appearing     HEENT: Normocephalic, atraumatic, clear sclera, PERRLA, EOMI, Clear nares, Moist Mucosas, Normal Pharynx, No exudates, no lesions     Neck: Supple, no carotid bruits, no JVD, No thyromegaly    Respiratory: Normal respiratory rate and effort, decreased breath sounds, prominent crackles/rales in lung bases bilaterally    Cardiac: Regular rate and rhythm, no murmurs, rubs or gallop.    GI: soft nt, ascitic fluid reduced in amount, site of paracentesis with slight tenderness, dried blood     Extremities: AV fistula in LUE + thrill, No cyanosis    Neurological: Patient is alert and oriented x4, CN II-XII grossly intact, no sensory or motor deficits.    Skin: Warm and dry, no abnormal discolorations, no bruises, no bleeding, no lacerations.

## 2019-12-16 NOTE — PROGRESS NOTE ADULT - ASSESSMENT
59 yr old female w ESRD on HD M-W-F, DM II, PAF on no AC due to GI bleed, HFrEF 35-40%, nonobstructive CAD, pericarditis s/p pericardiocentesis, recurrent ascites s/p multiple paracentesis last one 1month ago at [facility], s/p umbilical hernia repair in 13 Nov 2019; presented to ED c/o chest pain/epigastric pain with associated nausea and brown color vomitus, pain is 5/10 with radiation to the back admitted for possible GIB, elevated troponin r/o ACS. Found to have large ascites. To undergo dyalisis today. Pending paracentesis and EGD.    Chest pain due to CAD  -serial trop; persistently flat elevated, possibly due to CKD  -not consistent with ACS  -ekg: NSR, No ST elevation  -echo shows normal LV function  -cardiology consult appreciated  -previous hx of pericardial effusion  -recommended HD to remove excess fluid  -cleared for EGD procedure    Epigastric abdominal pain, coffee brown emesis r/o UGIB  -stress induced gastritis?  -start clear liquid diet today if tolerating PO  -NPO except meds for EGD tomorrow  -trend Hgb, transfuse as necessary  -cont with protonix 40 mg IV BID  -CT a/p to evaluated ascites   -[place] [facility] consult appreciated   -Zofran 4 mg ODT prn N, V.     Recurrent Ascites  -Hx of recurrent ascites  -noted ascites in exam, TTE, abd US and CT  -to undergo HD today for volume removal  -to undergo paracentesis for remaining fluid  -on abx prophylaxis to prevent SBP, rocephin IV    Anemia due to chronic kidney disease, ESRD  -multifactorial likely due to ESRD but r/o acute GIB   -serial h/h   -monitor for bleeding.   -transfuse if necessary  -type+screen    End stage renal disease on dialysis.    -HD today (scheduled dyalisis M/W/F)  -electrolytes within normal limits  -Nephro consulted Lauren.    Paroxysmal atrial fibrillation.    -not on AC due to prior GIB   -cont with Coreg 25mg bid.     Chronic systolic congestive heart failure.  -continue coreg 12.5 mg BID w parameters  -asa 81 mg enteric coated  -entresto qd  -daily weights  -TTE shows pEF    Type 2 diabetes mellitus with other specified complication, with long-term current use of insulin.     cont with ISS, hypoglycemic protocol   hold Lantus and premeal Humalog while npo.     Prophylactic measure.  -scd for now fro suspected UGIB. 59 yr old female w ESRD on HD M-W-F, DM II, PAF on no AC due to GI bleed, HFrEF 35-40%, nonobstructive CAD, pericarditis s/p pericardiocentesis, recurrent ascites s/p multiple paracentesis last one 1month ago at [facility], s/p umbilical hernia repair in 13 Nov 2019; presented to ED c/o chest pain/epigastric pain with associated nausea and brown color vomitus, pain is 5/10 with radiation to the back admitted for possible GIB, elevated troponin r/o ACS. Found to have large ascites. To undergo dyalisis today. Pending paracentesis and EGD.    Chest pain due to CAD  -serial trop; persistently flat elevated, possibly due to CKD  -not consistent with ACS  -ekg: NSR, No ST elevation  -echo shows normal LV function  -cardiology consult appreciated  -previous hx of pericardial effusion  -recommended HD to remove excess fluid  -cleared for EGD procedure    Epigastric abdominal pain, coffee brown emesis r/o UGIB  -stress induced gastritis?  -start clear liquid diet today if tolerating PO  -NPO except meds for EGD tomorrow  -trend Hgb, transfuse as necessary  -cont with protonix 40 mg IV BID  -CT a/p to evaluated ascites   -[place] [facility] consult appreciated   -Zofran 4 mg ODT prn N, V.     Recurrent Ascites  -Hx of recurrent ascites  -noted ascites in exam, TTE, abd US and CT  -paracentesis today, removed 10L of fluid, pending labs  -HD deferred to tomorrow  -on abx prophylaxis to prevent SBP, rocephin IV    Anemia due to chronic kidney disease, ESRD  -multifactorial likely due to ESRD but r/o acute GIB   -serial h/h   -monitor for bleeding.   -transfuse if necessary  -type+screen    End stage renal disease on dialysis.    -HD deferred until tomorrow (scheduled dyalisis M/W/F) by nephro  -electrolytes within normal limits  -Nephro consulted Lauren.    Paroxysmal atrial fibrillation.    -not on AC due to prior GIB   -cont with Coreg 25mg bid.     Chronic systolic congestive heart failure.  -continue coreg 12.5 mg BID w parameters  -asa 81 mg enteric coated  -entresto qd  -daily weights  -TTE shows pEF    Type 2 diabetes mellitus with other specified complication, with long-term current use of insulin.     cont with ISS, hypoglycemic protocol   hold Lantus and premeal Humalog while npo.     Prophylactic measure.  -scd for now fro suspected UGIB. 59 year old female w hx of ESRD on HD M-W-F L AVF, DM II, PAF on no AC due to GI bleed, chronic systolic HFrEF 35-40%, nonobstructive CAD, pericarditis s/p pericardiocentesis, recurrent ascites s/p multiple paracentesis prior 1month ago at [facility], s/p umbilical hernia repair on 11/13 who currently presented to the ED with worsening abdominal distension, nausea/ vomiting of coffee ground material with sequela of epigastric pain. Noted to be grossly volume overloaded with large amount ascites on exam/ imaging likely pressure from the amount of fluid causing the patient to have increased emesis. S/p large volume paracentesis with removal of 10 liters of fluid. Evaluated by GI and planned for EGD in the am.     c/o chest pain/epigastric pain with associated nausea and brown color vomitus, pain is 5/10 with radiation to the back admitted for possible GIB, elevated troponin r/o ACS. Found to have large ascites. To undergo dyalisis today. Pending paracentesis and EGD.    Chest pain due to CAD  -serial trop; persistently flat elevated, possibly due to CKD  -not consistent with ACS  -ekg: NSR, No ST elevation  -echo shows normal LV function  -cardiology consult appreciated  -previous hx of pericardial effusion  -recommended HD to remove excess fluid  -cleared for EGD procedure    Epigastric abdominal pain, coffee brown emesis r/o UGIB  -stress induced gastritis?  -start clear liquid diet today if tolerating PO  -NPO except meds for EGD tomorrow  -trend Hgb, transfuse as necessary  -cont with protonix 40 mg IV BID  -CT a/p to evaluated ascites   -[place] [facility] consult appreciated   -Zofran 4 mg ODT prn N, V.     Recurrent Ascites  -Hx of recurrent ascites  -noted ascites in exam, TTE, abd US and CT  -paracentesis today, removed 10L of fluid, pending labs  -HD deferred to tomorrow  -on abx prophylaxis to prevent SBP, rocephin IV    Anemia due to chronic kidney disease, ESRD  -multifactorial likely due to ESRD but r/o acute GIB   -serial h/h   -monitor for bleeding.   -transfuse if necessary  -type+screen    End stage renal disease on dialysis.    -HD deferred until tomorrow (scheduled dyalisis M/W/F) by nephro  -electrolytes within normal limits  -Nephro consulted Lauren.    Paroxysmal atrial fibrillation.    -not on AC due to prior GIB   -cont with Coreg 25mg bid.     Chronic systolic congestive heart failure.  -continue coreg 12.5 mg BID w parameters  -asa 81 mg enteric coated  -entresto qd  -daily weights  -TTE shows pEF    Type 2 diabetes mellitus with other specified complication, with long-term current use of insulin.     cont with ISS, hypoglycemic protocol   hold Lantus and premeal Humalog while npo.     Prophylactic measure.  -scd for now fro suspected UGIB. 59 year old female w hx of ESRD on HD M-W-F L AVF, DM II, PAF on no AC due to GI bleed, chronic systolic HFrEF 35-40%, nonobstructive CAD, pericarditis s/p pericardiocentesis, recurrent ascites s/p multiple paracentesis prior 1month ago at [facility], s/p umbilical hernia repair on 11/13 who currently presented to the ED with worsening abdominal distension, nausea/ vomiting of coffee ground material with sequela of epigastric pain. Noted to be grossly volume overloaded with large amount ascites on exam/ imaging likely pressure from the amount of fluid causing the patient to have increased emesis. S/p large volume paracentesis with removal of 10 liters of fluid. Evaluated by GI and planned for EGD in the am. Hospital course complicated by elevated troponin in the setting of esrd/ fluid overload with acute combined  HF exacerbation. To c/w off loading fluid with HD.  Cleared by cardio for procedure. Slow clinical improvement.     Epigastric abdominal pain with associated coffee ground emesis likely 2/2 stress induced from pressure from large amount of ascites; likely stress induced gastritis   - resolution of coffee ground emesis   - hgb down trending to 8   - will monitor closely and transfuse if closer to 7 with HD   - holding asa   -cont with protonix 40 mg IV BID  -CT a/p to evaluated ascites    -c/w zofran prn   - c/w clear liquid diet today   - NPO at midnight for EGD in the am     -cardiology consult and clearance noted and appreciated  -GI f/u  noted and appreciated     Recurrent Ascites  -Hx of recurrent ascites and fluid overload in a patient with ESRD and combined systolic/ diastolic HF now with preserved EF on TTE   -noted ascites on exam and imaging   -paracentesis today, removed 10L of fluid  - ascites fluid analysis noted no evidence of sbp  - ppx SBP abx c/w rocephin 1 g ivpb qd d# 1   - c/w florastor po bid   -HD deferred today bc large volume ascites performed to off load fluid, will c/w HD for fluid removal in the am   - c/w mx for HF   - nephro f.u noted and appreciated  - cardio f/u noted and appreciated  - GI f/u noted and appreciated     Elevated troponin in the setting of ESRD and acute combined systolic and diastolic HF exacerbation   - hx CAD non obstructive   - cardiac enzmyes elevated  - TTE with preserved EF  - daily weight  - strict I/O  - fluid restriction   - holding asa given suspected UGIB   - c/w coreg po bid   - c/w entresto po bid   - c/w off loading fluid with paracentesis and HD   - Cardio f/u noted and appreciated, no further work up needed and clearance noted     Anemia likely multifactorial  -likely due to ESRD but alco currently r/o acute UGIB   -serial h/h   -monitor for bleeding.   -transfuse if necessary  -type+screen    End stage renal disease on dialysis   -HD deferred until tomorrow (scheduled dialysis usually outpt is M/W/F)   -electrolytes within normal limits  - c/w off loading fluid with HD and schedule per nephro   -Nephro fu noted and appreciated     Paroxysmal atrial fibrillation  -not on AC due to GIB   - rate controlled   -cont with Coreg 25mg bid   - cardio f/u noted and appreciated     Type 2 diabetes mellitus   - HBA1C: 7.7   - fs stable   - no lantus until pt is eating regular diet   - c/w HSS   - c/w hypoglycemia protocol      Prophylactic measure.  -scds b/l   - no chemical AC given suspected GIB with anemia     Activity level: Increase as tolerated with walker  Baseline: Ambulates with walker     Dispo: Anticipated in 2- 3 days to return to home, lives with daughter. Pending EGD and fluid status.

## 2019-12-16 NOTE — CONSULT NOTE ADULT - ASSESSMENT
ESRD - Hd arranged for today     Anemia - Epogen with HD   EGD to evaluate coffee grounds, possibly   Trend Hgb on PPI     Ascites - ECHO and CT noted   Likely related to Cardiomyopathy   Paracentesis  to be arranged   Placed on Ceftriaxone   prior cytology, AFB and cultures  (-) 9-19  Prior SAAG ?     RO - Check Phos , not on binders . Calcium between meals

## 2019-12-16 NOTE — PROGRESS NOTE ADULT - SUBJECTIVE AND OBJECTIVE BOX
Patient is a 59y old  Female who presents with a chief complaint of chest pain/epigastric pain (16 Dec 2019 07:07)        PAST MEDICAL & SURGICAL HISTORY:  Coronary artery disease, angina presence unspecified, unspecified vessel or lesion type, unspecified whether native or transplanted heart  Paroxysmal atrial fibrillation  Anemia due to chronic kidney disease, unspecified CKD stage  Chronic systolic congestive heart failure  Pleural effusion  Pericardial effusion  End stage renal disease on dialysis  HLD (hyperlipidemia)  HTN (hypertension)  DM (diabetes mellitus)  A-V fistula  Encounter for dialysis catheter care        calcium carbonate   1250 mG (OsCal) 1 Tablet(s) Oral three times a day  carvedilol 25 milliGRAM(s) Oral every 12 hours  cefTRIAXone   IVPB      dextrose 5%. 1000 milliLiter(s) (50 mL/Hr) IV Continuous <Continuous>  dextrose 50% Injectable 12.5 Gram(s) IV Push once  dextrose 50% Injectable 25 Gram(s) IV Push once  dextrose 50% Injectable 25 Gram(s) IV Push once  ferrous    sulfate 325 milliGRAM(s) Oral daily  insulin lispro (HumaLOG) corrective regimen sliding scale   SubCutaneous three times a day before meals  pantoprazole  Injectable 40 milliGRAM(s) IV Push two times a day  saccharomyces boulardii 250 milliGRAM(s) Oral two times a day  sacubitril 49 mG/valsartan 51 mG 1 Tablet(s) Oral <User Schedule>    MEDICATIONS  (PRN):  dextrose 40% Gel 15 Gram(s) Oral once PRN Blood Glucose LESS THAN 70 milliGRAM(s)/deciliter  glucagon  Injectable 1 milliGRAM(s) IntraMuscular once PRN Glucose LESS THAN 70 milligrams/deciliter  ondansetron Injectable 4 milliGRAM(s) IV Push every 6 hours PRN Nausea and/or Vomiting          Vital Signs Last 24 Hrs  T(C): 37.2 (16 Dec 2019 05:04), Max: 37.2 (16 Dec 2019 00:17)  T(F): 98.9 (16 Dec 2019 05:04), Max: 98.9 (16 Dec 2019 00:17)  HR: 68 (16 Dec 2019 05:04) (68 - 76)  BP: 122/64 (16 Dec 2019 05:04) (113/67 - 127/68)  BP(mean): --  RR: 18 (16 Dec 2019 05:04) (16 - 18)  SpO2: 96% (16 Dec 2019 05:04) (95% - 99%)    PHYSICAL EXAM:    GENERAL: In no apparent distress, well nourished, and hydrated.  HEART: Regular rate and rhythm; No murmurs, rubs, or gallops.  PULMONARY: Clear to auscultation and perfusion.  No rales, wheezing, or rhonchi bilaterally.  ABDOMEN: Soft, Nontender, Nondistended; Bowel sounds present  EXTREMITIES:  2+ Peripheral Pulses, No clubbing, cyanosis, or edema      ECG: sinus rhythm     I&O's Detail    15 Dec 2019 07:01  -  16 Dec 2019 07:00  --------------------------------------------------------  IN:    Oral Fluid: 90 mL  Total IN: 90 mL    OUT:  Total OUT: 0 mL    Total NET: 90 mL          LABS:                        8.4    4.33  )-----------( 308      ( 16 Dec 2019 08:28 )             28.4     12-15    139  |  89<L>  |  23.0<H>  ----------------------------<  122<H>  3.8   |  33.0<H>  |  4.19<H>    Ca    8.7      15 Dec 2019 16:21    TPro  7.1  /  Alb  3.0<L>  /  TBili  0.5  /  DBili  x   /  AST  13  /  ALT  <5  /  AlkPhos  125<H>  12-15    CARDIAC MARKERS ( 15 Dec 2019 16:21 )  x     / 0.91 ng/mL / x     / x     / x          PT/INR - ( 15 Dec 2019 16:21 )   PT: 13.0 sec;   INR: 1.13 ratio         PTT - ( 15 Dec 2019 16:21 )  PTT:31.3 sec    BNPSerum Pro-Brain Natriuretic Peptide: >00052 pg/mL (12-15 @ 16:21)    I&O's Detail    15 Dec 2019 07:01  -  16 Dec 2019 07:00  --------------------------------------------------------  IN:    Oral Fluid: 90 mL  Total IN: 90 mL    OUT:  Total OUT: 0 mL    Total NET: 90 mL        Daily     Daily     RADIOLOGY & ADDITIONAL STUDIES:

## 2019-12-16 NOTE — DISCHARGE NOTE PROVIDER - CARE PROVIDER_API CALL
Kemar Kidd)  Cardiovascular Disease; Internal Medicine  [address] [place] [address]  Phone: [phone]  Fax: [phone]  Follow Up Time:     Elian Oliveira)  Gastroenterology; Internal Medicine  [address] [facility], [place] [address]  Phone: [phone]  Fax: [phone]  Follow Up Time:     John Aguilar (DO)  Internal Medicine  [address] [facility] [place] [address]  Phone: [phone]  Fax: [phone]  Follow Up Time:

## 2019-12-17 LAB
ANION GAP SERPL CALC-SCNC: 14 MMOL/L — SIGNIFICANT CHANGE UP (ref 5–17)
ANISOCYTOSIS BLD QL: SLIGHT — SIGNIFICANT CHANGE UP
BASOPHILS # BLD AUTO: 0.04 K/UL — SIGNIFICANT CHANGE UP (ref 0–0.2)
BASOPHILS NFR BLD AUTO: 0.9 % — SIGNIFICANT CHANGE UP (ref 0–2)
BLD GP AB SCN SERPL QL: SIGNIFICANT CHANGE UP
BUN SERPL-MCNC: 34 MG/DL — HIGH (ref 8–20)
CALCIUM SERPL-MCNC: 8.1 MG/DL — LOW (ref 8.6–10.2)
CHLORIDE SERPL-SCNC: 91 MMOL/L — LOW (ref 98–107)
CO2 SERPL-SCNC: 34 MMOL/L — HIGH (ref 22–29)
CREAT SERPL-MCNC: 5.5 MG/DL — HIGH (ref 0.5–1.3)
EOSINOPHIL # BLD AUTO: 0.2 K/UL — SIGNIFICANT CHANGE UP (ref 0–0.5)
EOSINOPHIL NFR BLD AUTO: 4.4 % — SIGNIFICANT CHANGE UP (ref 0–6)
GIANT PLATELETS BLD QL SMEAR: PRESENT — SIGNIFICANT CHANGE UP
GLUCOSE BLDC GLUCOMTR-MCNC: 137 MG/DL — HIGH (ref 70–99)
GLUCOSE BLDC GLUCOMTR-MCNC: 184 MG/DL — HIGH (ref 70–99)
GLUCOSE BLDC GLUCOMTR-MCNC: 199 MG/DL — HIGH (ref 70–99)
GLUCOSE BLDC GLUCOMTR-MCNC: 229 MG/DL — HIGH (ref 70–99)
GLUCOSE SERPL-MCNC: 139 MG/DL — HIGH (ref 70–115)
HBA1C BLD-MCNC: 7.5 % — HIGH (ref 4–5.6)
HCT VFR BLD CALC: 30.5 % — LOW (ref 34.5–45)
HGB BLD-MCNC: 8.7 G/DL — LOW (ref 11.5–15.5)
HYPOCHROMIA BLD QL: SLIGHT — SIGNIFICANT CHANGE UP
LYMPHOCYTES # BLD AUTO: 0.12 K/UL — LOW (ref 1–3.3)
LYMPHOCYTES # BLD AUTO: 2.6 % — LOW (ref 13–44)
MANUAL SMEAR VERIFICATION: SIGNIFICANT CHANGE UP
MCHC RBC-ENTMCNC: 25.7 PG — LOW (ref 27–34)
MCHC RBC-ENTMCNC: 28.5 GM/DL — LOW (ref 32–36)
MCV RBC AUTO: 90.2 FL — SIGNIFICANT CHANGE UP (ref 80–100)
MONOCYTES # BLD AUTO: 0.2 K/UL — SIGNIFICANT CHANGE UP (ref 0–0.9)
MONOCYTES NFR BLD AUTO: 4.4 % — SIGNIFICANT CHANGE UP (ref 2–14)
NEUTROPHILS # BLD AUTO: 4.03 K/UL — SIGNIFICANT CHANGE UP (ref 1.8–7.4)
NEUTROPHILS NFR BLD AUTO: 86.8 % — HIGH (ref 43–77)
NEUTS BAND # BLD: 0.9 % — SIGNIFICANT CHANGE UP (ref 0–8)
OVALOCYTES BLD QL SMEAR: SLIGHT — SIGNIFICANT CHANGE UP
PHOSPHATE SERPL-MCNC: 4.1 MG/DL — SIGNIFICANT CHANGE UP (ref 2.4–4.7)
PLAT MORPH BLD: NORMAL — SIGNIFICANT CHANGE UP
PLATELET # BLD AUTO: 317 K/UL — SIGNIFICANT CHANGE UP (ref 150–400)
POIKILOCYTOSIS BLD QL AUTO: SLIGHT — SIGNIFICANT CHANGE UP
POLYCHROMASIA BLD QL SMEAR: SLIGHT — SIGNIFICANT CHANGE UP
POTASSIUM SERPL-MCNC: 4.9 MMOL/L — SIGNIFICANT CHANGE UP (ref 3.5–5.3)
POTASSIUM SERPL-SCNC: 4.9 MMOL/L — SIGNIFICANT CHANGE UP (ref 3.5–5.3)
RBC # BLD: 3.38 M/UL — LOW (ref 3.8–5.2)
RBC # FLD: 15.4 % — HIGH (ref 10.3–14.5)
RBC BLD AUTO: ABNORMAL
SCHISTOCYTES BLD QL AUTO: SLIGHT — SIGNIFICANT CHANGE UP
SODIUM SERPL-SCNC: 139 MMOL/L — SIGNIFICANT CHANGE UP (ref 135–145)
WBC # BLD: 4.6 K/UL — SIGNIFICANT CHANGE UP (ref 3.8–10.5)
WBC # FLD AUTO: 4.6 K/UL — SIGNIFICANT CHANGE UP (ref 3.8–10.5)

## 2019-12-17 PROCEDURE — 99232 SBSQ HOSP IP/OBS MODERATE 35: CPT | Mod: GC

## 2019-12-17 PROCEDURE — 99233 SBSQ HOSP IP/OBS HIGH 50: CPT

## 2019-12-17 RX ORDER — ERYTHROPOIETIN 10000 [IU]/ML
10000 INJECTION, SOLUTION INTRAVENOUS; SUBCUTANEOUS ONCE
Refills: 0 | Status: COMPLETED | OUTPATIENT
Start: 2019-12-17 | End: 2019-12-17

## 2019-12-17 RX ADMIN — Medication 1 TABLET(S): at 06:14

## 2019-12-17 RX ADMIN — Medication 1: at 12:35

## 2019-12-17 RX ADMIN — SACUBITRIL AND VALSARTAN 1 TABLET(S): 24; 26 TABLET, FILM COATED ORAL at 11:59

## 2019-12-17 RX ADMIN — Medication 1 TABLET(S): at 21:31

## 2019-12-17 RX ADMIN — CARVEDILOL PHOSPHATE 25 MILLIGRAM(S): 80 CAPSULE, EXTENDED RELEASE ORAL at 18:10

## 2019-12-17 RX ADMIN — ERYTHROPOIETIN 10000 UNIT(S): 10000 INJECTION, SOLUTION INTRAVENOUS; SUBCUTANEOUS at 15:21

## 2019-12-17 RX ADMIN — PANTOPRAZOLE SODIUM 40 MILLIGRAM(S): 20 TABLET, DELAYED RELEASE ORAL at 18:15

## 2019-12-17 RX ADMIN — CARVEDILOL PHOSPHATE 25 MILLIGRAM(S): 80 CAPSULE, EXTENDED RELEASE ORAL at 06:14

## 2019-12-17 RX ADMIN — Medication 250 MILLIGRAM(S): at 06:14

## 2019-12-17 RX ADMIN — PANTOPRAZOLE SODIUM 40 MILLIGRAM(S): 20 TABLET, DELAYED RELEASE ORAL at 06:14

## 2019-12-17 RX ADMIN — CEFTRIAXONE 100 MILLIGRAM(S): 500 INJECTION, POWDER, FOR SOLUTION INTRAMUSCULAR; INTRAVENOUS at 06:44

## 2019-12-17 RX ADMIN — Medication 1 TABLET(S): at 18:10

## 2019-12-17 RX ADMIN — Medication 2: at 18:11

## 2019-12-17 RX ADMIN — Medication 325 MILLIGRAM(S): at 18:10

## 2019-12-17 NOTE — PROGRESS NOTE ADULT - SUBJECTIVE AND OBJECTIVE BOX
Patient is a 59y old  Female who presents with a chief complaint of chest pain/epigastric pain (16 Dec 2019 14:58) remains fluid overloaded with abd distension     INTERVAL HPI/OVERNIGHT EVENTS:  Patient seen and examined at bedside. No acute events overnight. Patient states she has not had any further episodes of vomiting since hospitalization. Continues with burning epigastric pain, had one episode of dark stool yest. Reports she always throws up and has this abdominal pain when she accumulates a lot of fluid. Pt had paracentesis yesterday with 10 L of fluid extracted, educated on importance of fluid restriction.   To undergo HD today and EGD tomorrow, NPO after midnight.  Pt in agreement with the plan of care. Patient denies chest pain, SOB, abd pain, N/V, fever, chills, dysuria or any other complaints. All remainder ROS negative.     ROS Negative except as above.    Vital Signs Last 24 Hrs  T(C): 36.7 (17 Dec 2019 12:24), Max: 37 (16 Dec 2019 17:30)  T(F): 98 (17 Dec 2019 12:24), Max: 98.6 (16 Dec 2019 17:30)  HR: 69 (17 Dec 2019 12:24) (65 - 90)  BP: 108/60 (17 Dec 2019 12:24) (108/60 - 148/70)  RR: 18 (17 Dec 2019 12:24) (16 - 20)  SpO2: 98% (17 Dec 2019 12:24) (92% - 99%)    Telemetry: NSR, no alarms    PHYSICAL EXAM:  General: Elderly weak female, thin WD NAD, ill appearing   HEENT: Normocephalic, atraumatic, clear sclera, PERRLA, EOMI, Clear nares, Dry Mucosas, Normal Pharynx, No exudates, no lesions   Neck: Supple, no carotid bruits, no JVD, No thyromegaly  Respiratory: Normal respiratory rate and effort, decreased breath sounds, prominent crackles/rales in lung bases bilaterally  Cardiac: Regular rate and rhythm, no murmurs, rubs or gallop.  GI: Ascitic abdomen has decreased, RUQ punture site with mild dry blood, non actively bleeding or suppuration soft, mild LRQ tenderness on palpation, no rebound or guarding  Extremities: AV fistula in LUE + thrill, No cyanosis  Neurological: Patient is alert and oriented x4, CN II-XII grossly intact, no sensory or motor deficits.  Skin: Warm and dry, no abnormal discolorations, no bruises, no bleeding, no lacerations.    Hemoglobin + Hematocrit (12.16.19 @ 18:18)    Hemoglobin: 8.8 g/dL    Hematocrit: 29.6 %               8.7    4.60  )-----------( 317      ( 17 Dec 2019 06:05 )             30.5     12-17    139  |  91<L>  |  34.0<H>  ----------------------------<  139<H>  4.9   |  34.0<H>  |  5.50<H>    Ca    8.1<L>      17 Dec 2019 06:05  Phos  4.1     12-17    TPro  7.1  /  Alb  3.0<L>  /  TBili  0.5  /  DBili  x   /  AST  13  /  ALT  <5  /  AlkPhos  125<H>  12-15    Hemoglobin A1C, Whole Blood (12.17.19 @ 06:05)    Hemoglobin A1C, Whole Blood: 7.5 %    CAPILLARY BLOOD GLUCOSE  POCT Blood Glucose.: 199 mg/dL (17 Dec 2019 12:25)  POCT Blood Glucose.: 137 mg/dL (17 Dec 2019 08:39)  POCT Blood Glucose.: 152 mg/dL (16 Dec 2019 21:30)  POCT Blood Glucose.: 114 mg/dL (16 Dec 2019 17:23)    Type + Screen (12.17.19 @ 06:07)    ABO RH Interpretation: O POS    Cell Count, Body Fluid (12.16.19 @ 16:23)    Monocyte/Macrophage Count - Body Fluid: 61 %    Fluid Type: Peritoneal fl ABDOMINAL ASCITES    Body Fluid Appearance: Clear    Fluid Segmented Granulocytes: 19 %    BF Lymphocytes: 20 %    Tube Type: Tube 3    Color - Body Fluid: Straw    Total Nucleated Cell Count, Body Fluid: 174: Peritoneal/Pleural/ Pericardial  Body Fluid Types            Total Nucleated cells: <500/uL            Neutrophils: <25%          Synovial Body Fluid Type           Total Nucleated cells: <150/uL           Neutrophils: <25%           Lymphocytes: <75%           Monocytes/Macrophages: </=70%  Please note reference range updated effective Nov 26, 2019 /uL    Total RBC Count: 190 /uL    Glucose, Fluid (12.16.19 @ 21:28)    Glucose, Fluid: 132 mg/dL  Lactate Dehydrogenase, Fluid (12.16.19 @ 21:28)    Lactate Dehydrogenase, Fluid: 143 U/L  Albumin, Fluid (12.16.19 @ 21:28)    Albumin, Fluid: 1.9 g/dL  Protein Total, Fluid: 4.5 g/dL (12.16.19 @ 21:28)    Culture - Body Fluid with Gram Stain (12.16.19 @ 16:20)    Gram Stain:   Few WBC's  No organisms seen    Specimen Source: .Body Fluid    Culture Results:   No growth at 1 day.  Culture in progress    MEDICATIONS  (STANDING):  calcium carbonate   1250 mG (OsCal) 1 Tablet(s) Oral three times a day  carvedilol 25 milliGRAM(s) Oral every 12 hours  dextrose 5%. 1000 milliLiter(s) (50 mL/Hr) IV Continuous <Continuous>  dextrose 50% Injectable 12.5 Gram(s) IV Push once  dextrose 50% Injectable 25 Gram(s) IV Push once  dextrose 50% Injectable 25 Gram(s) IV Push once  epoetin gian Injectable 08070 Unit(s) IV Push <User Schedule>  epoetin gian Injectable 05159 Unit(s) IV Push once  ferrous    sulfate 325 milliGRAM(s) Oral daily  insulin lispro (HumaLOG) corrective regimen sliding scale   SubCutaneous three times a day before meals  pantoprazole  Injectable 40 milliGRAM(s) IV Push two times a day  sacubitril 49 mG/valsartan 51 mG 1 Tablet(s) Oral <User Schedule>    MEDICATIONS  (PRN):  dextrose 40% Gel 15 Gram(s) Oral once PRN Blood Glucose LESS THAN 70 milliGRAM(s)/deciliter  glucagon  Injectable 1 milliGRAM(s) IntraMuscular once PRN Glucose LESS THAN 70 milligrams/deciliter  ondansetron Injectable 4 milliGRAM(s) IV Push every 6 hours PRN Nausea and/or Vomiting Patient is a 59y old  Female who presents with a chief complaint of chest pain/epigastric pain (16 Dec 2019 14:58) improving fluid overload status     INTERVAL HPI/OVERNIGHT EVENTS:  Patient seen and examined at bedside. No acute events overnight. Patient states she has not had any further episodes of vomiting since hospitalization. Continues with burning epigastric pain, had one episode of dark stool yest. Reports she always throws up and has this abdominal pain when she accumulates a lot of fluid. Pt had paracentesis yesterday with 10 L of fluid extracted, educated on importance of fluid restriction.   To undergo HD today and EGD tomorrow, NPO after midnight.  Pt in agreement with the plan of care. Patient denies chest pain, SOB, abd pain, N/V, fever, chills, dysuria or any other complaints. All remainder ROS negative.     ROS Negative except as above.    Vital Signs Last 24 Hrs  T(C): 36.7 (17 Dec 2019 12:24), Max: 37 (16 Dec 2019 17:30)  T(F): 98 (17 Dec 2019 12:24), Max: 98.6 (16 Dec 2019 17:30)  HR: 69 (17 Dec 2019 12:24) (65 - 90)  BP: 108/60 (17 Dec 2019 12:24) (108/60 - 148/70)  RR: 18 (17 Dec 2019 12:24) (16 - 20)  SpO2: 98% (17 Dec 2019 12:24) (92% - 99%)    Telemetry: NSR, no alarms    PHYSICAL EXAM:  General: Elderly weak female, thin WD NAD, ill appearing   HEENT: Normocephalic, atraumatic, clear sclera, PERRLA, EOMI, Clear nares, Dry Mucosas, Normal Pharynx, No exudates, no lesions   Neck: Supple, no carotid bruits, no JVD, No thyromegaly  Respiratory: Normal respiratory rate and effort, decreased breath sounds, prominent crackles/rales in lung bases bilaterally  Cardiac: Regular rate and rhythm, no murmurs, rubs or gallop.  GI: soft nt ascitic fluid reduced in amount, site of paracentesis with slight tenderness, dried blood   Extremities: AV fistula in LUE + thrill, No cyanosis  Neurological: Patient is alert and oriented x4, CN II-XII grossly intact, no sensory or motor deficits.  Skin: Warm and dry, no abnormal discolorations, no bruises, no bleeding, no lacerations.    Hemoglobin + Hematocrit (12.16.19 @ 18:18)    Hemoglobin: 8.8 g/dL    Hematocrit: 29.6 %               8.7    4.60  )-----------( 317      ( 17 Dec 2019 06:05 )             30.5     12-17    139  |  91<L>  |  34.0<H>  ----------------------------<  139<H>  4.9   |  34.0<H>  |  5.50<H>    Ca    8.1<L>      17 Dec 2019 06:05  Phos  4.1     12-17    TPro  7.1  /  Alb  3.0<L>  /  TBili  0.5  /  DBili  x   /  AST  13  /  ALT  <5  /  AlkPhos  125<H>  12-15    Hemoglobin A1C, Whole Blood (12.17.19 @ 06:05)    Hemoglobin A1C, Whole Blood: 7.5 %    CAPILLARY BLOOD GLUCOSE  POCT Blood Glucose.: 199 mg/dL (17 Dec 2019 12:25)  POCT Blood Glucose.: 137 mg/dL (17 Dec 2019 08:39)  POCT Blood Glucose.: 152 mg/dL (16 Dec 2019 21:30)  POCT Blood Glucose.: 114 mg/dL (16 Dec 2019 17:23)    Type + Screen (12.17.19 @ 06:07)    ABO RH Interpretation: O POS    Cell Count, Body Fluid (12.16.19 @ 16:23)    Monocyte/Macrophage Count - Body Fluid: 61 %    Fluid Type: Peritoneal fl ABDOMINAL ASCITES    Body Fluid Appearance: Clear    Fluid Segmented Granulocytes: 19 %    BF Lymphocytes: 20 %    Tube Type: Tube 3    Color - Body Fluid: Straw    Total Nucleated Cell Count, Body Fluid: 174: Peritoneal/Pleural/ Pericardial  Body Fluid Types            Total Nucleated cells: <500/uL            Neutrophils: <25%          Synovial Body Fluid Type           Total Nucleated cells: <150/uL           Neutrophils: <25%           Lymphocytes: <75%           Monocytes/Macrophages: </=70%  Please note reference range updated effective Nov 26, 2019 /uL    Total RBC Count: 190 /uL    Glucose, Fluid (12.16.19 @ 21:28)    Glucose, Fluid: 132 mg/dL  Lactate Dehydrogenase, Fluid (12.16.19 @ 21:28)    Lactate Dehydrogenase, Fluid: 143 U/L  Albumin, Fluid (12.16.19 @ 21:28)    Albumin, Fluid: 1.9 g/dL  Protein Total, Fluid: 4.5 g/dL (12.16.19 @ 21:28)    Culture - Body Fluid with Gram Stain (12.16.19 @ 16:20)    Gram Stain:   Few WBC's  No organisms seen    Specimen Source: .Body Fluid    Culture Results:   No growth at 1 day.  Culture in progress    MEDICATIONS  (STANDING):  calcium carbonate   1250 mG (OsCal) 1 Tablet(s) Oral three times a day  carvedilol 25 milliGRAM(s) Oral every 12 hours  dextrose 5%. 1000 milliLiter(s) (50 mL/Hr) IV Continuous <Continuous>  dextrose 50% Injectable 12.5 Gram(s) IV Push once  dextrose 50% Injectable 25 Gram(s) IV Push once  dextrose 50% Injectable 25 Gram(s) IV Push once  epoetin gian Injectable 09119 Unit(s) IV Push <User Schedule>  epoetin gian Injectable 90629 Unit(s) IV Push once  ferrous    sulfate 325 milliGRAM(s) Oral daily  insulin lispro (HumaLOG) corrective regimen sliding scale   SubCutaneous three times a day before meals  pantoprazole  Injectable 40 milliGRAM(s) IV Push two times a day  sacubitril 49 mG/valsartan 51 mG 1 Tablet(s) Oral <User Schedule>    MEDICATIONS  (PRN):  dextrose 40% Gel 15 Gram(s) Oral once PRN Blood Glucose LESS THAN 70 milliGRAM(s)/deciliter  glucagon  Injectable 1 milliGRAM(s) IntraMuscular once PRN Glucose LESS THAN 70 milligrams/deciliter  ondansetron Injectable 4 milliGRAM(s) IV Push every 6 hours PRN Nausea and/or Vomiting

## 2019-12-17 NOTE — PROGRESS NOTE ADULT - SUBJECTIVE AND OBJECTIVE BOX
Patient seen and examined; chart reviewed.  S/P 10.1 l paracentesis yesterday.  Benign fluid   Ascites WBC:  WBC: 174;  19% polys.  Therefore not c/w SBP.    Remains afebrile;  Tolerates renal diet.  Last Tapped here in 9/2019.  No hx of liver disease.  No further vomiting or bleeding.  Due for HD later today.  EGD will be deferred to tomorrow.  Cardiac cleared.        PAST MEDICAL & SURGICAL HISTORY:  Coronary artery disease, angina presence unspecified, unspecified vessel or lesion type, unspecified whether native or transplanted heart  Paroxysmal atrial fibrillation  Anemia due to chronic kidney disease, unspecified CKD stage  Chronic systolic congestive heart failure  Pleural effusion  Pericardial effusion  End stage renal disease on dialysis  HLD (hyperlipidemia)  HTN (hypertension)  DM (diabetes mellitus)  A-V fistula  Encounter for dialysis catheter care      ROS:  No Heartburn, regurgitation, dysphagia, odynophagia.  No dyspepsia  No abdominal pain.    No Nausea, vomiting.  No Bleeding.  No hematemesis.   No diarrhea.    No hematochezia.  No weight loss, anorexia.  No edema.      MEDICATIONS  (STANDING):  calcium carbonate   1250 mG (OsCal) 1 Tablet(s) Oral three times a day  carvedilol 25 milliGRAM(s) Oral every 12 hours  cefTRIAXone   IVPB 1000 milliGRAM(s) IV Intermittent every 24 hours  cefTRIAXone   IVPB      dextrose 5%. 1000 milliLiter(s) (50 mL/Hr) IV Continuous <Continuous>  dextrose 50% Injectable 12.5 Gram(s) IV Push once  dextrose 50% Injectable 25 Gram(s) IV Push once  dextrose 50% Injectable 25 Gram(s) IV Push once  epoetin gian Injectable 40755 Unit(s) IV Push <User Schedule>  ferrous    sulfate 325 milliGRAM(s) Oral daily  insulin lispro (HumaLOG) corrective regimen sliding scale   SubCutaneous three times a day before meals  pantoprazole  Injectable 40 milliGRAM(s) IV Push two times a day  saccharomyces boulardii 250 milliGRAM(s) Oral two times a day  sacubitril 49 mG/valsartan 51 mG 1 Tablet(s) Oral <User Schedule>    MEDICATIONS  (PRN):  dextrose 40% Gel 15 Gram(s) Oral once PRN Blood Glucose LESS THAN 70 milliGRAM(s)/deciliter  glucagon  Injectable 1 milliGRAM(s) IntraMuscular once PRN Glucose LESS THAN 70 milligrams/deciliter  ondansetron Injectable 4 milliGRAM(s) IV Push every 6 hours PRN Nausea and/or Vomiting      Allergies    No Known Allergies    Intolerances        Vital Signs Last 24 Hrs  T(C): 36.4 (17 Dec 2019 06:10), Max: 37 (16 Dec 2019 11:54)  T(F): 97.6 (17 Dec 2019 06:10), Max: 98.6 (16 Dec 2019 11:54)  HR: 90 (17 Dec 2019 06:10) (68 - 90)  BP: 132/66 (17 Dec 2019 06:10) (123/67 - 148/77)  BP(mean): --  RR: 16 (17 Dec 2019 06:10) (16 - 20)  SpO2: 92% (17 Dec 2019 06:10) (92% - 98%)    PHYSICAL EXAM:    GENERAL: NAD, well-groomed, well-developed  HEAD:  Atraumatic, Normocephalic  EYES: EOMI, PERRLA, conjunctiva and sclera clear  ENMT: No tonsillar erythema, exudates, or enlargement; Moist mucous membranes, Good dentition, No lesions  NECK: Supple, No JVD, Normal thyroid  CHEST/LUNG: Clear to percussion bilaterally; No rales, rhonchi, wheezing, or rubs  HEART: Regular rate and rhythm; No murmurs, rubs, or gallops  ABDOMEN: Soft, Nontender, Nondistended; Bowel sounds present.  No HSM.  No MTR.    EXTREMITIES:  2+ Peripheral Pulses, No clubbing, cyanosis, or edema  LYMPH: No lymphadenopathy noted  SKIN: No rashes or lesions      LABS:                        8.7    4.60  )-----------( 317      ( 17 Dec 2019 06:05 )             30.5     12-17    139  |  91<L>  |  34.0<H>  ----------------------------<  139<H>  4.9   |  34.0<H>  |  5.50<H>    Ca    8.1<L>      17 Dec 2019 06:05  Phos  4.1     12-17    TPro  7.1  /  Alb  3.0<L>  /  TBili  0.5  /  DBili  x   /  AST  13  /  ALT  <5  /  AlkPhos  125<H>  12-15    PT/INR - ( 15 Dec 2019 16:21 )   PT: 13.0 sec;   INR: 1.13 ratio         PTT - ( 15 Dec 2019 16:21 )  PTT:31.3 sec         RADIOLOGY & ADDITIONAL STUDIES:  CXR:  B/L Lower lobe atelectasis.

## 2019-12-17 NOTE — PROGRESS NOTE ADULT - ASSESSMENT
CG vomitus with fluid overload.  S/p LVP with small dose of iv Albumin infused. 12.5 gm albumin.  Tolerated well.  Cardiac cleared.    HD later today  EGD for Wednesday.  OK to eat today.  Will make NPO p MN tonite for EGD tomorrow.  ASA #3.

## 2019-12-17 NOTE — PROGRESS NOTE ADULT - SUBJECTIVE AND OBJECTIVE BOX
S: Patient denies chest pain or dyspnea.       MEDICATIONS  (STANDING):  calcium carbonate   1250 mG (OsCal) 1 Tablet(s) Oral three times a day  carvedilol 25 milliGRAM(s) Oral every 12 hours  dextrose 5%. 1000 milliLiter(s) (50 mL/Hr) IV Continuous <Continuous>  dextrose 50% Injectable 12.5 Gram(s) IV Push once  dextrose 50% Injectable 25 Gram(s) IV Push once  dextrose 50% Injectable 25 Gram(s) IV Push once  epoetin gian Injectable 65402 Unit(s) IV Push <User Schedule>  ferrous    sulfate 325 milliGRAM(s) Oral daily  insulin lispro (HumaLOG) corrective regimen sliding scale   SubCutaneous three times a day before meals  pantoprazole  Injectable 40 milliGRAM(s) IV Push two times a day  sacubitril 49 mG/valsartan 51 mG 1 Tablet(s) Oral <User Schedule>    MEDICATIONS  (PRN):  dextrose 40% Gel 15 Gram(s) Oral once PRN Blood Glucose LESS THAN 70 milliGRAM(s)/deciliter  glucagon  Injectable 1 milliGRAM(s) IntraMuscular once PRN Glucose LESS THAN 70 milligrams/deciliter  ondansetron Injectable 4 milliGRAM(s) IV Push every 6 hours PRN Nausea and/or Vomiting        Vital Signs Last 24 Hrs  T(C): 36.4 (17 Dec 2019 06:10), Max: 37 (16 Dec 2019 11:54)  T(F): 97.6 (17 Dec 2019 06:10), Max: 98.6 (16 Dec 2019 11:54)  HR: 90 (17 Dec 2019 06:10) (68 - 90)  BP: 132/66 (17 Dec 2019 06:10) (123/67 - 148/77)  BP(mean): --  RR: 16 (17 Dec 2019 06:10) (16 - 20)  SpO2: 92% (17 Dec 2019 06:10) (92% - 98%)    Daily     Daily     I&O's Detail      PHYSICAL EXAM:  Appearance: Normal, well nourished	  Cardiovascular: Normal S1 S2  Respiratory: Scattered coarse BS	  Gastrointestinal:  Soft, Non-tender, + BS, 	  Neurologic: Grossly non-focal.  Extremities: No edema    LABS:                        8.7    4.60  )-----------( 317      ( 17 Dec 2019 06:05 )             30.5     12-17    139  |  91<L>  |  34.0<H>  ----------------------------<  139<H>  4.9   |  34.0<H>  |  5.50<H>    Ca    8.1<L>      17 Dec 2019 06:05  Phos  4.1     12-17    TPro  7.1  /  Alb  3.0<L>  /  TBili  0.5  /  DBili  x   /  AST  13  /  ALT  <5  /  AlkPhos  125<H>  12-15    CARDIAC MARKERS ( 16 Dec 2019 08:28 )  x     / 0.84 ng/mL / x     / x     / x      CARDIAC MARKERS ( 15 Dec 2019 16:21 )  x     / 0.91 ng/mL / x     / x     / x          PT/INR - ( 15 Dec 2019 16:21 )   PT: 13.0 sec;   INR: 1.13 ratio         PTT - ( 15 Dec 2019 16:21 )  PTT:31.3 sec    Echo (12/16/2019)   from: TTE Echo Complete w/Doppler (12.16.19 @ 11:22) >  PHYSICIAN INTERPRETATION:  Left Ventricle: The left ventricular internal cavity size is normal. Left ventricular wall thickness is normal.  Global LV systolic function was normal. Left ventricular ejection fraction, by visual estimation, is 60 to 65%. Spectral Doppler shows normal pattern of LV diastolic filling.  Right Ventricle: Normal right ventricular size and function.  Left Atrium: Moderately enlarged leftatrium.  Right Atrium: Moderately enlarged right atrium.  Pericardium: There is no evidence of pericardial effusion. Severe ascites is noted. There is a large pleural effusion in both left and right lateral regions.  Mitral Valve: Thickening of the anterior and posterior mitral valve leaflets. Mild mitral valve regurgitation is seen.  Tricuspid Valve: Structurally normal tricuspid valve, with normal leaflet excursion. Mild tricuspid regurgitation is visualized.  Aortic Valve: The aortic valve is trileaflet. Trivial aortic valve regurgitation is seen. Mobile echo density seen on the AoV. Consider Lambl's  Pulmonic Valve: Structurally normal pulmonic valve, with normal leaflet excursion. Trace pulmonic valve regurgitation.  Aorta:The aortic root is normal in size and structure.  Pulmonary Artery: The main pulmonary artery is normal in size.

## 2019-12-17 NOTE — PROGRESS NOTE ADULT - ASSESSMENT
59 year old with history of ESRD - HD MWF, HTN, PAF, DM, s/p cath (3/23/18_ showed normal coronaries admitted with  1. chest pain: atypical, Marginal troponin elevation is chronic, due to CKD;  2. Ascites s/p 10 liter paracentesis (12/16/2019)   4. 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 Continue regular cardiac regimen  2. Agree with volume removal via HD

## 2019-12-17 NOTE — PROGRESS NOTE ADULT - ASSESSMENT
ESRD - Hd arranged for today     Anemia - Epogen with HD   EGD to evaluate coffee grounds, possibly am   Trend Hgb on PPI   Check iron stores and CBC in am     Ascites - ECHO and CT noted   Likely related to Cardiomyopathy   Paracentesis  done 12-16   Placed on Ceftriaxone   prior cytology, AFB and cultures  (-) 9-19  Prior SAAG ?     RO - Phos 4. 1

## 2019-12-17 NOTE — PROGRESS NOTE ADULT - ASSESSMENT
59 year old female w hx of ESRD on HD M-W-F L AVF, DM II, PAF on no AC due to GI bleed, chronic systolic HFrEF 35-40%, nonobstructive CAD, pericarditis s/p pericardiocentesis, recurrent ascites s/p multiple paracentesis prior 1month ago at [facility], s/p umbilical hernia repair on 11/13 who currently presented to the ED with worsening abdominal distension, nausea/ vomiting of coffee ground material with sequela of epigastric pain. Noted to be grossly volume overloaded with large amount ascites on exam/ imaging likely pressure from the amount of fluid causing the patient to have increased emesis. S/p large volume paracentesis with removal of 10 liters of fluid. Evaluated by GI and planned for EGD in the am. Hospital course complicated by elevated troponin in the setting of esrd/ fluid overload with acute combined HF exacerbation. To c/w off loading fluid with HD today.  Cleared by cardio for procedure. Slow clinical improvement.     Epigastric abdominal pain with associated coffee ground emesis likely 2/2 stress induced from pressure from large amount of ascites; likely stress induced gastritis   - resolution of coffee ground emesis   - hgb down trending to 8   - will monitor closely and transfuse if closer to 7 with HD   - holding asa   - cont with protonix 40 mg IV BID  - CT a/p to evaluated ascites    -c/w zofran prn   - c/w clear liquid diet today   - NPO at midnight for EGD in the am, can have clear liquid DASH/Diabetic Diet in the mean time today  -cardiology consult and clearance noted and appreciated  -GI f/u  noted and appreciated     Recurrent Ascites, s/p paracentesis 16/Dec/19  -Hx of recurrent ascites and fluid overload in a patient with ESRD and combined systolic/ diastolic HF now with preserved EF on TTE   -noted ascites on exam and imaging   -paracentesis on 16/Dec, removed 10L of fluid  - ascites fluid analysis noted no evidence of sbp  - ppx SBP abx: rocephin 1 g ivpb qd x1  - c/w florastor po bid   -HD deferred today bc large volume ascites performed to off load fluid, will c/w HD for fluid removal today  - c/w monitor for HF   - nephro f.u noted and appreciated  - cardio f/u noted and appreciated  - GI f/u noted and appreciated     Elevated troponin in the setting of ESRD and acute combined systolic and diastolic HF exacerbation   - hx CAD non obstructive   - cardiac enzmyes elevated  - TTE with preserved EF  - daily weight  - strict I/O  - fluid restriction   - holding asa given suspected UGIB   - c/w coreg po bid   - c/w entresto po bid   - c/w off loading fluid with paracentesis and HD   - Cardio f/u noted and appreciated, no further work up needed and clearance noted     Anemia likely multifactorial  -likely due to ESRD but alco currently r/o acute UGIB   -serial h/h , stable post paracentesis  -monitor for bleeding  -transfuse if necessary  -type+screen: O+    End stage renal disease on dialysis   -HD today (scheduled dialysis usually outpt is M/W/F)   -electrolytes within normal limits  -c/w off loading fluid with HD and schedule per nephro   -Nephro fu noted and appreciated     Paroxysmal atrial fibrillation  -not on AC due to GIB   -rate controlled   -cont with Coreg 25mg bid   -cardio f/u noted and appreciated     Type 2 diabetes mellitus   - HBA1C: 7.7   - fs stable   - no lantus until pt is eating regular diet   - c/w HSS   - c/w hypoglycemia protocol      Prophylactic measure.  -scds b/l   - no chemical AC given suspected GIB with anemia     Activity level: Increase as tolerated with walker  Baseline: Ambulates with walker     Dispo: Anticipated in 2- 3 days to return to home, lives with daughter. Pending EGD and fluid status. 59 year old female w hx of ESRD on HD M-W-F L AVF, DM II, PAF on no AC due to GI bleed, chronic systolic HFrEF 35-40%, nonobstructive CAD, pericarditis s/p pericardiocentesis, recurrent ascites s/p multiple paracentesis prior 1month ago at [facility], s/p umbilical hernia repair on 11/13 who currently presented to the ED with worsening abdominal distension, nausea/ vomiting of coffee ground material with sequela of epigastric pain. Noted to be grossly volume overloaded with large amount ascites on exam/ imaging likely pressure from the amount of fluid causing the patient to have increased emesis. S/p large volume paracentesis with removal of 10 liters of fluid. Evaluated by GI and planned for EGD in the am. Hospital course complicated by elevated troponin in the setting of esrd/ fluid overload with acute combined HF exacerbation. To c/w off loading fluid with HD today.  Cleared by cardio for procedure. Slow clinical improvement.     Epigastric abdominal pain with associated coffee ground emesis likely 2/2 stress induced from pressure from large amount of ascites; likely stress induced gastritis   - resolution of coffee ground emesis   - hgb down trending to 8   - will monitor closely and transfuse if closer to 7 with HD   - holding asa   - cont with protonix 40 mg IV BID  - CT a/p to evaluated ascites    -c/w zofran prn   - c/w clear liquid diet today   - NPO at midnight for EGD in the am, can have clear liquid DASH/Diabetic Diet in the mean time today  -cardiology consult and clearance noted and appreciated  -GI f/u  noted and appreciated     Recurrent Ascites, s/p paracentesis 16/Dec/19  -Hx of recurrent ascites and fluid overload in a patient with ESRD and combined systolic/ diastolic HF now with preserved EF on TTE   -noted ascites on exam and imaging   -paracentesis on 16/Dec, removed 10L of fluid  - ascites fluid analysis noted no evidence of sbp  - ppx SBP abx: rocephin 1 g ivpb qd x1  - c/w florastor po bid   -HD deferred today bc large volume ascites performed to off load fluid, will c/w HD for fluid removal today  - c/w monitor for HF   - nephro f.u noted and appreciated  - cardio f/u noted and appreciated  - GI f/u noted and appreciated     Elevated troponin in the setting of ESRD and acute combined systolic and diastolic HF exacerbation   - hx CAD non obstructive   - cardiac enzmyes elevated  - TTE with preserved EF  - daily weight  - strict I/O  - fluid restriction   - holding asa given suspected UGIB   - c/w coreg po bid   - c/w entresto po bid   - c/w off loading fluid with paracentesis and HD   - Cardio f/u noted and appreciated, no further work up needed and clearance noted     Anemia likely multifactorial  -likely due to ESRD but alco currently r/o acute UGIB   -serial h/h , stable post paracentesis  -monitor for bleeding  -transfuse if necessary  -type+screen: O+    End stage renal disease on dialysis   -HD today (scheduled dialysis usually outpt is M/W/F)   -electrolytes within normal limits  -c/w off loading fluid with HD and schedule per nephro   -Nephro fu noted and appreciated     Paroxysmal atrial fibrillation  -not on AC due to GIB   -rate controlled   -cont with Coreg 25mg bid   -cardio f/u noted and appreciated     Type 2 diabetes mellitus   - HBA1C: 7.7   - fs stable   - no lantus until pt is eating regular diet   - c/w HSS   - c/w hypoglycemia protocol      Prophylactic measure.  -scds b/l   - no chemical AC given suspected GIB with anemia     Activity level: Increase as tolerated with walker  Baseline: Ambulates with walker   -PT eval    Dispo: Anticipated in 2- 3 days to return to home, lives with daughter. Pending EGD and fluid status. 59 year old female w hx of ESRD on HD M-W-F L AVF, DM II, PAF on no AC due to GI bleed, chronic systolic HFrEF 35-40%, nonobstructive CAD, pericarditis s/p pericardiocentesis, recurrent ascites s/p multiple paracentesis prior 1month ago at [facility], s/p umbilical hernia repair on 11/13 who currently presented to the ED with worsening abdominal distension, nausea/ vomiting of coffee ground material with sequela of epigastric pain. Noted to be grossly volume overloaded with large amount ascites on exam/ imaging; likely pressure from the amount of fluid causing the patient to have increased emesis. S/p large volume paracentesis with removal of 10 liters of fluid on 12/16. Evaluated by GI and planned for EGD in the am. Hospital course complicated by elevated troponin in the setting of esrd/ fluid overload with acute combined HF exacerbation. To c/w off loading fluid with HD to be performed today.  Cleared by cardio for procedure. Slow clinical improvement.     Epigastric abdominal pain with associated coffee ground emesis likely 2/2 stress induced from pressure from large amount of ascites; stress induced gastritis   - resolution of coffee ground emesis   - hgb stable ~ 8   - will monitor closely and transfuse if closer to 7 with HD   - holding asa   - cont with protonix 40 mg IV BID  - CT a/p noted    -c/w zofran prn   - c/w clear liquid diet today   - NPO at midnight for EGD in the am   -cardiology consult and clearance noted and appreciated  -GI f/u  noted and appreciated     Fluid overload with Recurrent Ascites  -Hx of recurrent ascites and fluid overload in a patient with ESRD and combined systolic/ diastolic HF now with preserved EF on TTE   -noted ascites on exam and imaging   -paracentesis 12/16 removed 10L of fluid  - ascites fluid analysis noted no evidence of sbp  - ppx SBP abx: rocephin 1 g ivpb qd will continue for now in the setting of UGIB   - no probiotic  ppx given PC   - c/w offloading further fluid with HD   - c/w tapering off of supplemental o2 as tolerated   - nephro f.u noted and appreciated  - cardio f/u noted and appreciated  - GI f/u noted and appreciated     Elevated troponin in the setting of ESRD and acute combined systolic and diastolic HF exacerbation   - hx CAD non obstructive   - cardiac enzmyes elevated  - TTE with preserved EF  - daily weight  - strict I/O  - fluid restriction   - holding asa given suspected UGIB   - c/w coreg po bid   - c/w entresto po bid   - c/w off loading fluid with paracentesis and HD   - Cardio f/u noted and appreciated, no further work up needed and clearance noted, troponin elevation likely due to ESRD     Anemia likely multifactorial  -likely due to ESRD but also currently r/o acute UGIB   -serial h/h , stable post paracentesis  -monitor for bleeding  - epogen per nephro   - nephro f/u noted and appreciated     End stage renal disease on dialysis   -HD today (scheduled dialysis usually outpt is M/W/F) currently switched to [facility] schedule   -electrolytes within normal limits  -c/w off loading fluid with HD and schedule per nephro   -Nephro fu noted and appreciated     Paroxysmal atrial fibrillation  -not on AC due to GIB   -rate controlled   -cont with Coreg 25mg bid   -cardio f/u noted and appreciated     Type 2 diabetes mellitus   - HBA1C: 7.7   - fs stable   - no lantus until pt is eating regular diet   - c/w HSS   - c/w hypoglycemia protocol      Prophylactic measure.  -scds b/l   - no chemical AC given suspected GIB with anemia     Activity level: Increase as tolerated with walker  Baseline: Ambulates with walker   -PT eval    Dispo: Anticipated in 1-2 days to return to home, lives with daughter. Pending EGD and fluid status.

## 2019-12-17 NOTE — PROGRESS NOTE ADULT - SUBJECTIVE AND OBJECTIVE BOX
NEPHROLOGY INTERVAL HPI/OVERNIGHT EVENTS:  S/P LVP 10 L 12-16   Feels better   NO SOB or CP   GI follow up noted   Possible EGD in am       MEDICATIONS  (STANDING):  calcium carbonate   1250 mG (OsCal) 1 Tablet(s) Oral three times a day  carvedilol 25 milliGRAM(s) Oral every 12 hours  dextrose 5%. 1000 milliLiter(s) (50 mL/Hr) IV Continuous <Continuous>  dextrose 50% Injectable 12.5 Gram(s) IV Push once  dextrose 50% Injectable 25 Gram(s) IV Push once  dextrose 50% Injectable 25 Gram(s) IV Push once  epoetin gian Injectable 76433 Unit(s) IV Push <User Schedule>  epoetin gian Injectable 80600 Unit(s) IV Push once  ferrous    sulfate 325 milliGRAM(s) Oral daily  insulin lispro (HumaLOG) corrective regimen sliding scale   SubCutaneous three times a day before meals  pantoprazole  Injectable 40 milliGRAM(s) IV Push two times a day  sacubitril 49 mG/valsartan 51 mG 1 Tablet(s) Oral <User Schedule>    MEDICATIONS  (PRN):  dextrose 40% Gel 15 Gram(s) Oral once PRN Blood Glucose LESS THAN 70 milliGRAM(s)/deciliter  glucagon  Injectable 1 milliGRAM(s) IntraMuscular once PRN Glucose LESS THAN 70 milligrams/deciliter  ondansetron Injectable 4 milliGRAM(s) IV Push every 6 hours PRN Nausea and/or Vomiting      Allergies    No Known Allergies    Intolerances          Vital Signs Last 24 Hrs  T(C): 36.7 (17 Dec 2019 12:24), Max: 37 (16 Dec 2019 17:30)  T(F): 98 (17 Dec 2019 12:24), Max: 98.6 (16 Dec 2019 17:30)  HR: 69 (17 Dec 2019 12:24) (65 - 90)  BP: 108/60 (17 Dec 2019 12:24) (108/60 - 148/77)  BP(mean): --  RR: 18 (17 Dec 2019 12:24) (16 - 20)  SpO2: 98% (17 Dec 2019 12:24) (92% - 99%)  Daily     Daily   I&O's Detail    I&O's Summary      PHYSICAL EXAM:  HEAD:  NCAT  NECK: Supple, +JVD  NERVOUS SYSTEM:  Alert & Oriented X3  CHEST/LUNG:EAE , clear   HEART: Regular rate and rhythm; No rub  ABDOMEN: Soft, Nontender,less distended   EXTREMITIES: decreased edema , Access w/ thrill     LABS:                        8.7    4.60  )-----------( 317      ( 17 Dec 2019 06:05 )             30.5     12-17    139  |  91<L>  |  34.0<H>  ----------------------------<  139<H>  4.9   |  34.0<H>  |  5.50<H>    Ca    8.1<L>      17 Dec 2019 06:05  Phos  4.1     12-17    TPro  7.1  /  Alb  3.0<L>  /  TBili  0.5  /  DBili  x   /  AST  13  /  ALT  <5  /  AlkPhos  125<H>  12-15    PT/INR - ( 15 Dec 2019 16:21 )   PT: 13.0 sec;   INR: 1.13 ratio         PTT - ( 15 Dec 2019 16:21 )  PTT:31.3 sec    Phosphorus Level, Serum: 4.1 mg/dL (12-17 @ 06:05)          RADIOLOGY & ADDITIONAL TESTS:

## 2019-12-18 ENCOUNTER — RESULT REVIEW (OUTPATIENT)
Age: 59
End: 2019-12-18

## 2019-12-18 ENCOUNTER — TRANSCRIPTION ENCOUNTER (OUTPATIENT)
Age: 59
End: 2019-12-18

## 2019-12-18 VITALS
TEMPERATURE: 98 F | SYSTOLIC BLOOD PRESSURE: 118 MMHG | DIASTOLIC BLOOD PRESSURE: 67 MMHG | RESPIRATION RATE: 18 BRPM | OXYGEN SATURATION: 95 % | HEART RATE: 71 BPM

## 2019-12-18 LAB
ANION GAP SERPL CALC-SCNC: 12 MMOL/L — SIGNIFICANT CHANGE UP (ref 5–17)
BASOPHILS # BLD AUTO: 0.02 K/UL — SIGNIFICANT CHANGE UP (ref 0–0.2)
BASOPHILS NFR BLD AUTO: 0.6 % — SIGNIFICANT CHANGE UP (ref 0–2)
BUN SERPL-MCNC: 28 MG/DL — HIGH (ref 8–20)
CALCIUM SERPL-MCNC: 8.1 MG/DL — LOW (ref 8.6–10.2)
CHLORIDE SERPL-SCNC: 91 MMOL/L — LOW (ref 98–107)
CO2 SERPL-SCNC: 32 MMOL/L — HIGH (ref 22–29)
CREAT SERPL-MCNC: 4.31 MG/DL — HIGH (ref 0.5–1.3)
EOSINOPHIL # BLD AUTO: 0.15 K/UL — SIGNIFICANT CHANGE UP (ref 0–0.5)
EOSINOPHIL NFR BLD AUTO: 4.3 % — SIGNIFICANT CHANGE UP (ref 0–6)
FERRITIN SERPL-MCNC: 1076 NG/ML — HIGH (ref 15–150)
GLUCOSE BLDC GLUCOMTR-MCNC: 132 MG/DL — HIGH (ref 70–99)
GLUCOSE BLDC GLUCOMTR-MCNC: 133 MG/DL — HIGH (ref 70–99)
GLUCOSE BLDC GLUCOMTR-MCNC: 83 MG/DL — SIGNIFICANT CHANGE UP (ref 70–99)
GLUCOSE SERPL-MCNC: 144 MG/DL — HIGH (ref 70–115)
HCT VFR BLD CALC: 31.4 % — LOW (ref 34.5–45)
HGB BLD-MCNC: 9.3 G/DL — LOW (ref 11.5–15.5)
IMM GRANULOCYTES NFR BLD AUTO: 0.3 % — SIGNIFICANT CHANGE UP (ref 0–1.5)
IRON SATN MFR SERPL: 22 % — SIGNIFICANT CHANGE UP (ref 14–50)
IRON SATN MFR SERPL: 26 UG/DL — LOW (ref 37–145)
LYMPHOCYTES # BLD AUTO: 0.46 K/UL — LOW (ref 1–3.3)
LYMPHOCYTES # BLD AUTO: 13.1 % — SIGNIFICANT CHANGE UP (ref 13–44)
MAGNESIUM SERPL-MCNC: 1.9 MG/DL — SIGNIFICANT CHANGE UP (ref 1.8–2.6)
MCHC RBC-ENTMCNC: 26.2 PG — LOW (ref 27–34)
MCHC RBC-ENTMCNC: 29.6 GM/DL — LOW (ref 32–36)
MCV RBC AUTO: 88.5 FL — SIGNIFICANT CHANGE UP (ref 80–100)
MONOCYTES # BLD AUTO: 0.35 K/UL — SIGNIFICANT CHANGE UP (ref 0–0.9)
MONOCYTES NFR BLD AUTO: 10 % — SIGNIFICANT CHANGE UP (ref 2–14)
NEUTROPHILS # BLD AUTO: 2.52 K/UL — SIGNIFICANT CHANGE UP (ref 1.8–7.4)
NEUTROPHILS NFR BLD AUTO: 71.7 % — SIGNIFICANT CHANGE UP (ref 43–77)
NIGHT BLUE STAIN TISS: SIGNIFICANT CHANGE UP
PHOSPHATE SERPL-MCNC: 3.5 MG/DL — SIGNIFICANT CHANGE UP (ref 2.4–4.7)
PLATELET # BLD AUTO: 288 K/UL — SIGNIFICANT CHANGE UP (ref 150–400)
POTASSIUM SERPL-MCNC: 4.2 MMOL/L — SIGNIFICANT CHANGE UP (ref 3.5–5.3)
POTASSIUM SERPL-SCNC: 4.2 MMOL/L — SIGNIFICANT CHANGE UP (ref 3.5–5.3)
RBC # BLD: 3.55 M/UL — LOW (ref 3.8–5.2)
RBC # FLD: 15 % — HIGH (ref 10.3–14.5)
SODIUM SERPL-SCNC: 135 MMOL/L — SIGNIFICANT CHANGE UP (ref 135–145)
SPECIMEN SOURCE: SIGNIFICANT CHANGE UP
TIBC SERPL-MCNC: 117 UG/DL — LOW (ref 220–430)
TRANSFERRIN SERPL-MCNC: 82 MG/DL — LOW (ref 192–382)
WBC # BLD: 3.51 K/UL — LOW (ref 3.8–10.5)
WBC # FLD AUTO: 3.51 K/UL — LOW (ref 3.8–10.5)

## 2019-12-18 PROCEDURE — 87075 CULTR BACTERIA EXCEPT BLOOD: CPT

## 2019-12-18 PROCEDURE — 86850 RBC ANTIBODY SCREEN: CPT

## 2019-12-18 PROCEDURE — 87205 SMEAR GRAM STAIN: CPT

## 2019-12-18 PROCEDURE — 85610 PROTHROMBIN TIME: CPT

## 2019-12-18 PROCEDURE — 87116 MYCOBACTERIA CULTURE: CPT

## 2019-12-18 PROCEDURE — 88305 TISSUE EXAM BY PATHOLOGIST: CPT

## 2019-12-18 PROCEDURE — 99285 EMERGENCY DEPT VISIT HI MDM: CPT | Mod: 25

## 2019-12-18 PROCEDURE — 82042 OTHER SOURCE ALBUMIN QUAN EA: CPT

## 2019-12-18 PROCEDURE — 85014 HEMATOCRIT: CPT

## 2019-12-18 PROCEDURE — 80053 COMPREHEN METABOLIC PANEL: CPT

## 2019-12-18 PROCEDURE — 80048 BASIC METABOLIC PNL TOTAL CA: CPT

## 2019-12-18 PROCEDURE — 84157 ASSAY OF PROTEIN OTHER: CPT

## 2019-12-18 PROCEDURE — 85730 THROMBOPLASTIN TIME PARTIAL: CPT

## 2019-12-18 PROCEDURE — 99261: CPT

## 2019-12-18 PROCEDURE — 99239 HOSP IP/OBS DSCHRG MGMT >30: CPT

## 2019-12-18 PROCEDURE — 83550 IRON BINDING TEST: CPT

## 2019-12-18 PROCEDURE — 93306 TTE W/DOPPLER COMPLETE: CPT

## 2019-12-18 PROCEDURE — P9047: CPT

## 2019-12-18 PROCEDURE — 96375 TX/PRO/DX INJ NEW DRUG ADDON: CPT

## 2019-12-18 PROCEDURE — 96374 THER/PROPH/DIAG INJ IV PUSH: CPT

## 2019-12-18 PROCEDURE — 83690 ASSAY OF LIPASE: CPT

## 2019-12-18 PROCEDURE — 88305 TISSUE EXAM BY PATHOLOGIST: CPT | Mod: 26

## 2019-12-18 PROCEDURE — 84484 ASSAY OF TROPONIN QUANT: CPT

## 2019-12-18 PROCEDURE — 84100 ASSAY OF PHOSPHORUS: CPT

## 2019-12-18 PROCEDURE — 83036 HEMOGLOBIN GLYCOSYLATED A1C: CPT

## 2019-12-18 PROCEDURE — 86901 BLOOD TYPING SEROLOGIC RH(D): CPT

## 2019-12-18 PROCEDURE — 83880 ASSAY OF NATRIURETIC PEPTIDE: CPT

## 2019-12-18 PROCEDURE — 87070 CULTURE OTHR SPECIMN AEROBIC: CPT

## 2019-12-18 PROCEDURE — 89051 BODY FLUID CELL COUNT: CPT

## 2019-12-18 PROCEDURE — 87102 FUNGUS ISOLATION CULTURE: CPT

## 2019-12-18 PROCEDURE — 84466 ASSAY OF TRANSFERRIN: CPT

## 2019-12-18 PROCEDURE — 74176 CT ABD & PELVIS W/O CONTRAST: CPT

## 2019-12-18 PROCEDURE — 82962 GLUCOSE BLOOD TEST: CPT

## 2019-12-18 PROCEDURE — 76705 ECHO EXAM OF ABDOMEN: CPT

## 2019-12-18 PROCEDURE — 87015 SPECIMEN INFECT AGNT CONCNTJ: CPT

## 2019-12-18 PROCEDURE — 80061 LIPID PANEL: CPT

## 2019-12-18 PROCEDURE — 83540 ASSAY OF IRON: CPT

## 2019-12-18 PROCEDURE — 83615 LACTATE (LD) (LDH) ENZYME: CPT

## 2019-12-18 PROCEDURE — 88342 IMHCHEM/IMCYTCHM 1ST ANTB: CPT

## 2019-12-18 PROCEDURE — 88112 CYTOPATH CELL ENHANCE TECH: CPT

## 2019-12-18 PROCEDURE — 71045 X-RAY EXAM CHEST 1 VIEW: CPT

## 2019-12-18 PROCEDURE — 97163 PT EVAL HIGH COMPLEX 45 MIN: CPT

## 2019-12-18 PROCEDURE — 82728 ASSAY OF FERRITIN: CPT

## 2019-12-18 PROCEDURE — 85018 HEMOGLOBIN: CPT

## 2019-12-18 PROCEDURE — 87206 SMEAR FLUORESCENT/ACID STAI: CPT

## 2019-12-18 PROCEDURE — 93005 ELECTROCARDIOGRAM TRACING: CPT

## 2019-12-18 PROCEDURE — 83605 ASSAY OF LACTIC ACID: CPT

## 2019-12-18 PROCEDURE — 88342 IMHCHEM/IMCYTCHM 1ST ANTB: CPT | Mod: 26

## 2019-12-18 PROCEDURE — 86900 BLOOD TYPING SEROLOGIC ABO: CPT

## 2019-12-18 PROCEDURE — 85027 COMPLETE CBC AUTOMATED: CPT

## 2019-12-18 PROCEDURE — T1013: CPT

## 2019-12-18 PROCEDURE — 83735 ASSAY OF MAGNESIUM: CPT

## 2019-12-18 PROCEDURE — 36415 COLL VENOUS BLD VENIPUNCTURE: CPT

## 2019-12-18 PROCEDURE — 82945 GLUCOSE OTHER FLUID: CPT

## 2019-12-18 RX ORDER — INSULIN GLARGINE 100 [IU]/ML
4 INJECTION, SOLUTION SUBCUTANEOUS
Qty: 0 | Refills: 0 | DISCHARGE

## 2019-12-18 RX ORDER — PANTOPRAZOLE SODIUM 20 MG/1
1 TABLET, DELAYED RELEASE ORAL
Qty: 120 | Refills: 0
Start: 2019-12-18 | End: 2020-02-15

## 2019-12-18 RX ORDER — INSULIN LISPRO 100/ML
2 VIAL (ML) SUBCUTANEOUS
Qty: 0 | Refills: 0 | DISCHARGE

## 2019-12-18 RX ORDER — PANTOPRAZOLE SODIUM 20 MG/1
1 TABLET, DELAYED RELEASE ORAL
Qty: 0 | Refills: 0 | DISCHARGE

## 2019-12-18 RX ORDER — CEFTRIAXONE 500 MG/1
1000 INJECTION, POWDER, FOR SOLUTION INTRAMUSCULAR; INTRAVENOUS EVERY 24 HOURS
Refills: 0 | Status: DISCONTINUED | OUTPATIENT
Start: 2019-12-18 | End: 2019-12-18

## 2019-12-18 RX ORDER — CARVEDILOL PHOSPHATE 80 MG/1
25 CAPSULE, EXTENDED RELEASE ORAL EVERY 12 HOURS
Refills: 0 | Status: DISCONTINUED | OUTPATIENT
Start: 2019-12-18 | End: 2019-12-18

## 2019-12-18 RX ADMIN — PANTOPRAZOLE SODIUM 40 MILLIGRAM(S): 20 TABLET, DELAYED RELEASE ORAL at 06:16

## 2019-12-18 RX ADMIN — ERYTHROPOIETIN 10000 UNIT(S): 10000 INJECTION, SOLUTION INTRAVENOUS; SUBCUTANEOUS at 16:04

## 2019-12-18 RX ADMIN — CEFTRIAXONE 100 MILLIGRAM(S): 500 INJECTION, POWDER, FOR SOLUTION INTRAMUSCULAR; INTRAVENOUS at 06:13

## 2019-12-18 RX ADMIN — CARVEDILOL PHOSPHATE 25 MILLIGRAM(S): 80 CAPSULE, EXTENDED RELEASE ORAL at 06:12

## 2019-12-18 RX ADMIN — Medication 1 TABLET(S): at 06:17

## 2019-12-18 RX ADMIN — PANTOPRAZOLE SODIUM 40 MILLIGRAM(S): 20 TABLET, DELAYED RELEASE ORAL at 17:41

## 2019-12-18 RX ADMIN — Medication 325 MILLIGRAM(S): at 12:08

## 2019-12-18 RX ADMIN — CARVEDILOL PHOSPHATE 25 MILLIGRAM(S): 80 CAPSULE, EXTENDED RELEASE ORAL at 17:42

## 2019-12-18 RX ADMIN — Medication 1 TABLET(S): at 17:41

## 2019-12-18 NOTE — PHYSICAL THERAPY INITIAL EVALUATION ADULT - DISCHARGE DISPOSITION, PT EVAL
home w/ assist/rehabilitation facility/Home with 24/7 assist if family willing and able to provide level of assist, vs [facility]/home w/ home PT

## 2019-12-18 NOTE — DISCHARGE NOTE NURSING/CASE MANAGEMENT/SOCIAL WORK - PATIENT PORTAL LINK FT
You can access the FollowMyHealth Patient Portal offered by [facility] by registering at the following website: [email or web address]. By joining "[employer]’s FollowMyHealth portal, you will also be able to view your health information using other applications (apps) compatible with our system.

## 2019-12-18 NOTE — PROGRESS NOTE ADULT - SUBJECTIVE AND OBJECTIVE BOX
Patient is a 59y old  Female who presents with a chief complaint of chest pain/epigastric pain (18 Dec 2019 08:12)        PAST MEDICAL & SURGICAL HISTORY:  Coronary artery disease, angina presence unspecified, unspecified vessel or lesion type, unspecified whether native or transplanted heart  Paroxysmal atrial fibrillation  Anemia due to chronic kidney disease, unspecified CKD stage  Chronic systolic congestive heart failure  Pleural effusion  Pericardial effusion  End stage renal disease on dialysis  HLD (hyperlipidemia)  HTN (hypertension)  DM (diabetes mellitus)  A-V fistula  Encounter for dialysis catheter care        MEDICATIONS  (STANDING):  calcium carbonate   1250 mG (OsCal) 1 Tablet(s) Oral three times a day  carvedilol 25 milliGRAM(s) Oral every 12 hours  cefTRIAXone   IVPB 1000 milliGRAM(s) IV Intermittent every 24 hours  dextrose 5%. 1000 milliLiter(s) (50 mL/Hr) IV Continuous <Continuous>  dextrose 50% Injectable 12.5 Gram(s) IV Push once  dextrose 50% Injectable 25 Gram(s) IV Push once  dextrose 50% Injectable 25 Gram(s) IV Push once  epoetin gian Injectable 65409 Unit(s) IV Push <User Schedule>  ferrous    sulfate 325 milliGRAM(s) Oral daily  insulin lispro (HumaLOG) corrective regimen sliding scale   SubCutaneous three times a day before meals  pantoprazole  Injectable 40 milliGRAM(s) IV Push two times a day  sacubitril 49 mG/valsartan 51 mG 1 Tablet(s) Oral <User Schedule>    MEDICATIONS  (PRN):  dextrose 40% Gel 15 Gram(s) Oral once PRN Blood Glucose LESS THAN 70 milliGRAM(s)/deciliter  glucagon  Injectable 1 milliGRAM(s) IntraMuscular once PRN Glucose LESS THAN 70 milligrams/deciliter  ondansetron Injectable 4 milliGRAM(s) IV Push every 6 hours PRN Nausea and/or Vomiting        Vital Signs Last 24 Hrs  T(C): 36.6 (18 Dec 2019 13:30), Max: 36.8 (17 Dec 2019 23:30)  T(F): 97.9 (18 Dec 2019 13:30), Max: 98.2 (17 Dec 2019 23:30)  HR: 71 (18 Dec 2019 13:30) (61 - 71)  BP: 105/59 (18 Dec 2019 13:30) (105/59 - 146/62)  BP(mean): --  RR: 18 (18 Dec 2019 07:42) (16 - 18)  SpO2: 99% (18 Dec 2019 13:30) (93% - 99%)    PHYSICAL EXAM:  GENERAL: In no apparent distress, well nourished, and hydrated.  HEART: Regular rate and rhythm; No murmurs, rubs, or gallops.  PULMONARY: Clear to auscultation and perfusion.  No rales, wheezing, or rhonchi bilaterally.  ABDOMEN: Soft, Nontender, Nondistended; Bowel sounds present  EXTREMITIES:  2+ Peripheral Pulses, No clubbing, cyanosis, or edema      ECG: sinus rhythm     I&O's Detail    17 Dec 2019 07:01  -  18 Dec 2019 07:00  --------------------------------------------------------  IN:    Other: 1000 mL  Total IN: 1000 mL    OUT:  Total OUT: 0 mL    Total NET: 1000 mL          LABS:                        9.3    3.51  )-----------( 288      ( 18 Dec 2019 12:57 )             31.4     12-18    135  |  91<L>  |  28.0<H>  ----------------------------<  144<H>  4.2   |  32.0<H>  |  4.31<H>    Ca    8.1<L>      18 Dec 2019 12:57  Phos  3.5     12-18  Mg     1.9     12-18              BNP  I&O's Detail    17 Dec 2019 07:01  -  18 Dec 2019 07:00  --------------------------------------------------------  IN:    Other: 1000 mL  Total IN: 1000 mL    OUT:  Total OUT: 0 mL    Total NET: 1000 mL        Daily     Daily     RADIOLOGY & ADDITIONAL STUDIES:

## 2019-12-18 NOTE — PROGRESS NOTE ADULT - SUBJECTIVE AND OBJECTIVE BOX
NEPHROLOGY INTERVAL HPI/OVERNIGHT EVENTS:  No new events.      MEDICATIONS  (STANDING):  calcium carbonate   1250 mG (OsCal) 1 Tablet(s) Oral three times a day  carvedilol 25 milliGRAM(s) Oral every 12 hours  dextrose 5%. 1000 milliLiter(s) (50 mL/Hr) IV Continuous <Continuous>  dextrose 50% Injectable 12.5 Gram(s) IV Push once  dextrose 50% Injectable 25 Gram(s) IV Push once  dextrose 50% Injectable 25 Gram(s) IV Push once  epoetin gian Injectable 93522 Unit(s) IV Push <User Schedule>  epoetin gian Injectable 56010 Unit(s) IV Push once  ferrous    sulfate 325 milliGRAM(s) Oral daily  insulin lispro (HumaLOG) corrective regimen sliding scale   SubCutaneous three times a day before meals  pantoprazole  Injectable 40 milliGRAM(s) IV Push two times a day  sacubitril 49 mG/valsartan 51 mG 1 Tablet(s) Oral <User Schedule>    MEDICATIONS  (PRN):  dextrose 40% Gel 15 Gram(s) Oral once PRN Blood Glucose LESS THAN 70 milliGRAM(s)/deciliter  glucagon  Injectable 1 milliGRAM(s) IntraMuscular once PRN Glucose LESS THAN 70 milligrams/deciliter  ondansetron Injectable 4 milliGRAM(s) IV Push every 6 hours PRN Nausea and/or Vomiting      Allergies    No Known Allergies            Vital Signs Last 24 Hrs  T(C): 36.6 (18 Dec 2019 07:42), Max: 36.8 (17 Dec 2019 23:30)  T(F): 97.9 (18 Dec 2019 07:42), Max: 98.2 (17 Dec 2019 23:30)  HR: 67 (18 Dec 2019 07:42) (61 - 69)  BP: 126/68 (18 Dec 2019 07:42) (108/60 - 146/62)  BP(mean): --  RR: 18 (18 Dec 2019 07:42) (16 - 18)  SpO2: 93% (18 Dec 2019 07:42) (93% - 99%)  T(C): 36.7 (17 Dec 2019 12:24), Max: 37 (16 Dec 2019 17:30)  T(F): 98 (17 Dec 2019 12:24), Max: 98.6 (16 Dec 2019 17:30)  HR: 69 (17 Dec 2019 12:24) (65 - 90)  BP: 108/60 (17 Dec 2019 12:24) (108/60 - 148/77)  BP(mean): --  RR: 18 (17 Dec 2019 12:24) (16 - 20)  SpO2: 98% (17 Dec 2019 12:24) (92% - 99%)      PHYSICAL EXAM:  HEAD:  NCAT  NECK: Supple, +JVD  NERVOUS SYSTEM:  Alert & Oriented   CHEST/LUNG:EAE , clear   HEART: Regular rate and rhythm; No rub  ABDOMEN: Soft, Nontender, no distention   EXTREMITIES: decreased edema , left upper arm access with bruit      LABS:                        8.7    4.60  )-----------( 317      ( 17 Dec 2019 06:05 )             30.5     12-17    139  |  91<L>  |  34.0<H>  ----------------------------<  139<H>  4.9   |  34.0<H>  |  5.50<H>    Ca    8.1<L>      17 Dec 2019 06:05  Phos  4.1     12-17    TPro  7.1  /  Alb  3.0<L>  /  TBili  0.5  /  DBili  x   /  AST  13  /  ALT  <5  /  AlkPhos  125<H>  12-15    PT/INR - ( 15 Dec 2019 16:21 )   PT: 13.0 sec;   INR: 1.13 ratio         PTT - ( 15 Dec 2019 16:21 )  PTT:31.3 sec    Phosphorus Level, Serum: 4.1 mg/dL (12-17 @ 06:05)          RADIOLOGY & ADDITIONAL TESTS:

## 2019-12-18 NOTE — PHYSICAL THERAPY INITIAL EVALUATION ADULT - ADDITIONAL COMMENTS
Pt lives with daughter, who works,  in an apartment with 5 steps to enter. Reports being Modified Independent with all, PTA, without devices.

## 2019-12-18 NOTE — PROGRESS NOTE ADULT - ASSESSMENT
59 year old with history of ESRD - HD MWF, HTN, PAF, DM, presents with complaint of nausea and vomiting followed by chest discomfort at rest. Denies recent exertional symptoms. Has not missed HD sessions. LHC 3/23/18 showed normal coronaries.   She does complain of distended abdomen. Has prior history of pericardial effusion.     1. chest pain: atypical, preceded by nausea and vomiting. Normal LHC March 2018. Marginal troponin elevation is chronic, due to CKD; cardiac enzymes "flat" and not consistent with ACS   2. history of pericardial effusion  3. ascites: may require further fluid removal via HD  4. 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 Continue regular cardiac regimen  2. Agree with volume removal via HD

## 2019-12-18 NOTE — DISCHARGE NOTE NURSING/CASE MANAGEMENT/SOCIAL WORK - NSDCFUADDAPPT_GEN_ALL_CORE_FT
Please follow up with your primary care physician in 3-5 days. please follow up with the cardiologist, nephrologist within the week.

## 2019-12-18 NOTE — BRIEF OPERATIVE NOTE - OPERATION/FINDINGS
large linear ulceration in the distal esophagus  erosive esophagitis  GEJ @ 35 cm   2 cm HH  mild gastroduodenitis

## 2019-12-18 NOTE — BRIEF OPERATIVE NOTE - NSICDXBRIEFPOSTOP_GEN_ALL_CORE_FT
POST-OP DIAGNOSIS:  Duodenitis 18-Dec-2019 10:06:39  Madi Parks  Gastritis 18-Dec-2019 10:05:43  Madi Parks  Erosive esophagitis 18-Dec-2019 10:05:19  Madi Parks  Hiatal hernia 18-Dec-2019 10:04:52  Madi Parks

## 2019-12-19 NOTE — PROGRESS NOTE ADULT - SUBJECTIVE AND OBJECTIVE BOX
Patient is a 59y old  Female who presents with a chief complaint of chest pain/epigastric pain (18 Dec 2019 14:00)        PAST MEDICAL & SURGICAL HISTORY:  Coronary artery disease, angina presence unspecified, unspecified vessel or lesion type, unspecified whether native or transplanted heart  Paroxysmal atrial fibrillation  Anemia due to chronic kidney disease, unspecified CKD stage  Chronic systolic congestive heart failure  Pleural effusion  Pericardial effusion  End stage renal disease on dialysis  HLD (hyperlipidemia)  HTN (hypertension)  DM (diabetes mellitus)  A-V fistula  Encounter for dialysis catheter care          Vital Signs Last 24 Hrs  T(C): 36.8 (18 Dec 2019 17:29), Max: 36.8 (18 Dec 2019 17:29)  T(F): 98.3 (18 Dec 2019 17:29), Max: 98.3 (18 Dec 2019 17:29)  HR: 71 (18 Dec 2019 17:29) (63 - 71)  BP: 118/67 (18 Dec 2019 17:29) (105/59 - 171/95)  BP(mean): --  RR: 18 (18 Dec 2019 17:29) (18 - 18)  SpO2: 95% (18 Dec 2019 17:29) (95% - 99%)    PHYSICAL EXAM:    GENERAL: In no apparent distress, well nourished, and hydrated.  HEART: Regular rate and rhythm; No murmurs, rubs, or gallops.  PULMONARY: Clear to auscultation and perfusion.  No rales, wheezing, or rhonchi bilaterally.  ABDOMEN: Soft, Nontender, Nondistended; Bowel sounds present  EXTREMITIES:  2+ Peripheral Pulses, No clubbing, cyanosis, or edema      ECG: sinus rhythm       I&O's Detail    18 Dec 2019 07:01  -  19 Dec 2019 07:00  --------------------------------------------------------  IN:  Total IN: 0 mL    OUT:    Other: 1000 mL  Total OUT: 1000 mL    Total NET: -1000 mL          LABS:                        9.3    3.51  )-----------( 288      ( 18 Dec 2019 12:57 )             31.4     12-18    135  |  91<L>  |  28.0<H>  ----------------------------<  144<H>  4.2   |  32.0<H>  |  4.31<H>    Ca    8.1<L>      18 Dec 2019 12:57  Phos  3.5     12-18  Mg     1.9     12-18              BNP  I&O's Detail    18 Dec 2019 07:01  -  19 Dec 2019 07:00  --------------------------------------------------------  IN:  Total IN: 0 mL    OUT:    Other: 1000 mL  Total OUT: 1000 mL    Total NET: -1000 mL        Daily     Daily     RADIOLOGY & ADDITIONAL STUDIES:

## 2019-12-19 NOTE — PROGRESS NOTE ADULT - REASON FOR ADMISSION
chest pain/epigastric pain

## 2019-12-19 NOTE — PROGRESS NOTE ADULT - ASSESSMENT
59 year old with history of ESRD - HD MWF, HTN, PAF, DM, presents with complaint of nausea and vomiting followed by chest discomfort at rest. Denies recent exertional symptoms. Has not missed HD sessions. LHC 3/23/18 showed normal coronaries.   She does complain of distended abdomen. Has prior history of pericardial effusion.     1. chest pain: atypical, preceded by nausea and vomiting. Normal LHC March 2018. Marginal troponin elevation is chronic, due to CKD; cardiac enzymes "flat" and not consistent with ACS   2. history of pericardial effusion  3. ascites: may require further fluid removal via HD  4. 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 Continue regular cardiac regimen  2. Agree with volume removal via HD

## 2019-12-22 LAB
CULTURE RESULTS: SIGNIFICANT CHANGE UP
SPECIMEN SOURCE: SIGNIFICANT CHANGE UP

## 2019-12-26 ENCOUNTER — EMERGENCY (EMERGENCY)
Facility: HOSPITAL | Age: 59
LOS: 1 days | Discharge: HOME | End: 2019-12-26
Attending: EMERGENCY MEDICINE
Payer: MEDICARE

## 2019-12-26 VITALS
TEMPERATURE: 97 F | HEART RATE: 68 BPM | SYSTOLIC BLOOD PRESSURE: 105 MMHG | RESPIRATION RATE: 16 BRPM | DIASTOLIC BLOOD PRESSURE: 64 MMHG | OXYGEN SATURATION: 97 %

## 2019-12-26 VITALS
RESPIRATION RATE: 18 BRPM | DIASTOLIC BLOOD PRESSURE: 58 MMHG | HEART RATE: 58 BPM | OXYGEN SATURATION: 95 % | SYSTOLIC BLOOD PRESSURE: 109 MMHG

## 2019-12-26 DIAGNOSIS — I77.0 ARTERIOVENOUS FISTULA, ACQUIRED: Chronic | ICD-10-CM

## 2019-12-26 DIAGNOSIS — Z49.01 ENCOUNTER FOR FITTING AND ADJUSTMENT OF EXTRACORPOREAL DIALYSIS CATHETER: Chronic | ICD-10-CM

## 2019-12-26 LAB
ALBUMIN SERPL ELPH-MCNC: 2.2 G/DL — LOW (ref 3.3–5.2)
ALP SERPL-CCNC: 96 U/L — SIGNIFICANT CHANGE UP (ref 40–120)
ALT FLD-CCNC: 5 U/L — SIGNIFICANT CHANGE UP
ANION GAP SERPL CALC-SCNC: 11 MMOL/L — SIGNIFICANT CHANGE UP (ref 5–17)
AST SERPL-CCNC: 16 U/L — SIGNIFICANT CHANGE UP
BASOPHILS # BLD AUTO: 0.03 K/UL — SIGNIFICANT CHANGE UP (ref 0–0.2)
BASOPHILS NFR BLD AUTO: 0.6 % — SIGNIFICANT CHANGE UP (ref 0–2)
BILIRUB SERPL-MCNC: 0.4 MG/DL — SIGNIFICANT CHANGE UP (ref 0.4–2)
BUN SERPL-MCNC: 29 MG/DL — HIGH (ref 8–20)
CALCIUM SERPL-MCNC: 8.2 MG/DL — LOW (ref 8.6–10.2)
CHLORIDE SERPL-SCNC: 95 MMOL/L — LOW (ref 98–107)
CO2 SERPL-SCNC: 27 MMOL/L — SIGNIFICANT CHANGE UP (ref 22–29)
CREAT SERPL-MCNC: 3.75 MG/DL — HIGH (ref 0.5–1.3)
EOSINOPHIL # BLD AUTO: 0.15 K/UL — SIGNIFICANT CHANGE UP (ref 0–0.5)
EOSINOPHIL NFR BLD AUTO: 3 % — SIGNIFICANT CHANGE UP (ref 0–6)
GLUCOSE SERPL-MCNC: 100 MG/DL — SIGNIFICANT CHANGE UP (ref 70–115)
HCT VFR BLD CALC: 29.7 % — LOW (ref 34.5–45)
HGB BLD-MCNC: 8.5 G/DL — LOW (ref 11.5–15.5)
IMM GRANULOCYTES NFR BLD AUTO: 0.4 % — SIGNIFICANT CHANGE UP (ref 0–1.5)
LIDOCAIN IGE QN: 14 U/L — LOW (ref 22–51)
LYMPHOCYTES # BLD AUTO: 0.53 K/UL — LOW (ref 1–3.3)
LYMPHOCYTES # BLD AUTO: 10.8 % — LOW (ref 13–44)
MCHC RBC-ENTMCNC: 26.2 PG — LOW (ref 27–34)
MCHC RBC-ENTMCNC: 28.6 GM/DL — LOW (ref 32–36)
MCV RBC AUTO: 91.4 FL — SIGNIFICANT CHANGE UP (ref 80–100)
MONOCYTES # BLD AUTO: 0.62 K/UL — SIGNIFICANT CHANGE UP (ref 0–0.9)
MONOCYTES NFR BLD AUTO: 12.6 % — SIGNIFICANT CHANGE UP (ref 2–14)
NEUTROPHILS # BLD AUTO: 3.58 K/UL — SIGNIFICANT CHANGE UP (ref 1.8–7.4)
NEUTROPHILS NFR BLD AUTO: 72.6 % — SIGNIFICANT CHANGE UP (ref 43–77)
PLATELET # BLD AUTO: 287 K/UL — SIGNIFICANT CHANGE UP (ref 150–400)
POTASSIUM SERPL-MCNC: 4.6 MMOL/L — SIGNIFICANT CHANGE UP (ref 3.5–5.3)
POTASSIUM SERPL-SCNC: 4.6 MMOL/L — SIGNIFICANT CHANGE UP (ref 3.5–5.3)
PROT SERPL-MCNC: 5.9 G/DL — LOW (ref 6.6–8.7)
RBC # BLD: 3.25 M/UL — LOW (ref 3.8–5.2)
RBC # FLD: 16.3 % — HIGH (ref 10.3–14.5)
SODIUM SERPL-SCNC: 133 MMOL/L — LOW (ref 135–145)
WBC # BLD: 4.93 K/UL — SIGNIFICANT CHANGE UP (ref 3.8–10.5)
WBC # FLD AUTO: 4.93 K/UL — SIGNIFICANT CHANGE UP (ref 3.8–10.5)

## 2019-12-26 PROCEDURE — 96374 THER/PROPH/DIAG INJ IV PUSH: CPT

## 2019-12-26 PROCEDURE — 82962 GLUCOSE BLOOD TEST: CPT

## 2019-12-26 PROCEDURE — 36415 COLL VENOUS BLD VENIPUNCTURE: CPT

## 2019-12-26 PROCEDURE — 70450 CT HEAD/BRAIN W/O DYE: CPT | Mod: 26

## 2019-12-26 PROCEDURE — 99284 EMERGENCY DEPT VISIT MOD MDM: CPT | Mod: 25

## 2019-12-26 PROCEDURE — 99284 EMERGENCY DEPT VISIT MOD MDM: CPT

## 2019-12-26 PROCEDURE — 71045 X-RAY EXAM CHEST 1 VIEW: CPT | Mod: 26

## 2019-12-26 PROCEDURE — 70450 CT HEAD/BRAIN W/O DYE: CPT

## 2019-12-26 PROCEDURE — 93010 ELECTROCARDIOGRAM REPORT: CPT

## 2019-12-26 PROCEDURE — 80053 COMPREHEN METABOLIC PANEL: CPT

## 2019-12-26 PROCEDURE — 83690 ASSAY OF LIPASE: CPT

## 2019-12-26 PROCEDURE — 85027 COMPLETE CBC AUTOMATED: CPT

## 2019-12-26 PROCEDURE — T1013: CPT

## 2019-12-26 PROCEDURE — 71045 X-RAY EXAM CHEST 1 VIEW: CPT

## 2019-12-26 PROCEDURE — 93005 ELECTROCARDIOGRAM TRACING: CPT

## 2019-12-26 RX ORDER — DEXTROSE 50 % IN WATER 50 %
25 SYRINGE (ML) INTRAVENOUS ONCE
Refills: 0 | Status: COMPLETED | OUTPATIENT
Start: 2019-12-26 | End: 2019-12-26

## 2019-12-26 RX ADMIN — Medication 25 GRAM(S): at 17:28

## 2019-12-26 NOTE — ED ADULT NURSE REASSESSMENT NOTE - NS ED NURSE REASSESS COMMENT FT1
Pt awake and alert with family at bedside.  Pt given turkey sandwich and apple juice.  Tolerating well.

## 2019-12-26 NOTE — ED PROVIDER NOTE - OBJECTIVE STATEMENT
This patient is a 59 year old woman with hx of DM and ESRD on dialysis who presents to the ER via EMS with reports of seizure.  As per EMS patient was reported to have more than 10 minutes of seizure.  No reports of falls or trauma.  Patient was sitting on the couch and began sweating and then became more lethargic before seizing.  No hx of seizure.  EMS reports finger stick of 20 in the field and was given 250 cc of D10.  She was also then given 5 mg of versed.  No reports of fever.

## 2019-12-26 NOTE — ED PROVIDER NOTE - PATIENT PORTAL LINK FT
You can access the FollowMyHealth Patient Portal offered by [facility] by registering at the following website: [email or web address]. By joining [employer]’s FollowMyHealth portal, you will also be able to view your health information using other applications (apps) compatible with our system.

## 2019-12-26 NOTE — ED ADULT NURSE NOTE - CHIEF COMPLAINT QUOTE
seizing at home on cough for 15 minutes.  finger stick 20.  EMS gave D10 IV and versed.  Pt arrives lethargic, open eyes to voice.

## 2019-12-26 NOTE — ED ADULT NURSE NOTE - OBJECTIVE STATEMENT
Assumed pt. care at 1655 at  RN. Pt. comes in postictal after 15 minutes of seizure activity. EMS states they gave pt. 5mg of versed on ambulance. Pt. is diabetic. EMS states pt. BS was 20. Pt. was hypotensive and low oxygen saturation at scene. Pt. son states that pt. was in the chair, and she wasn't on the floor. Pt. son states. pt. is normally A&Ox3. Pt. is dialysis pt and hx of diabetes. . No hx of seizure.

## 2019-12-26 NOTE — ED ADULT NURSE REASSESSMENT NOTE - NS ED NURSE REASSESS COMMENT FT1
Pt a&ox3 at this time. Recognizing family at bedside. No seizure-like activity noted. VSS, . Warming blanket applied. Pt denies pain/discomfort.

## 2019-12-26 NOTE — ED PROVIDER NOTE - NSFOLLOWUPINSTRUCTIONS_ED_ALL_ED_FT
It is very important that you check your finger stick glucose prior to giving yourself insulin.  Do not give yourself insulin without checking first.  Do not given yourself insulin if your finger stick is less than 120.    If your symptoms worsen return to the ER.

## 2019-12-26 NOTE — ED PROVIDER NOTE - CLINICAL SUMMARY MEDICAL DECISION MAKING FREE TEXT BOX
59 year old hx of DM and ESRD on dialysis BIBA for seizure hypoglycemic in the field.  Patient had another episode of hypoglycemia in the ER given D50 and monitored with no further episodes occurring in the ER.  CT head negative for acute pathology.  She was mildly hypothermic and re-warmed.  Patient remained stable and was discharged with instructions to check her blood glucose before administering insulin, follow-up with PMD but to return to the ER immediately if symptoms return or worsen.

## 2019-12-26 NOTE — ED ADULT NURSE REASSESSMENT NOTE - NS ED NURSE REASSESS COMMENT FT1
Assumed pt care at 2000.  Pt received eating meal, tolerated well.  Fingerstick as per results, Dr. Peñaloza aware.  Pt offering no complaints at this time.  Will continue to monitor and reassess.

## 2019-12-26 NOTE — ED ADULT TRIAGE NOTE - CHIEF COMPLAINT QUOTE
Patient comes from home, found seizing, EMS reports seizure activity x15 minutes witnessed by PD. Reports BGL was initially 20, received 250 D10, BGL: 235. 5mg Versed IM. Has 18g to left EJ. Patient brought to Critical Care area for immediate eval, Dr. Peñaloza at bedside.

## 2019-12-26 NOTE — ED ADULT NURSE NOTE - PATIENT IS UNABLE TO BE SCREENED DUE TO:
Airway patent, Nasal mucosa clear. Mouth with normal mucosa. Throat has no vesicles, no oropharyngeal exudates and uvula is midline. Acuity of illness

## 2020-01-02 ENCOUNTER — APPOINTMENT (OUTPATIENT)
Dept: GASTROENTEROLOGY | Facility: CLINIC | Age: 60
End: 2020-01-02
Payer: MEDICARE

## 2020-01-02 VITALS
HEART RATE: 68 BPM | RESPIRATION RATE: 16 BRPM | DIASTOLIC BLOOD PRESSURE: 84 MMHG | SYSTOLIC BLOOD PRESSURE: 122 MMHG | HEIGHT: 65 IN | OXYGEN SATURATION: 99 % | WEIGHT: 148 LBS | BODY MASS INDEX: 24.66 KG/M2

## 2020-01-02 DIAGNOSIS — Z86.2 PERSONAL HISTORY OF DISEASES OF THE BLOOD AND BLOOD-FORMING ORGANS AND CERTAIN DISORDERS INVOLVING THE IMMUNE MECHANISM: ICD-10-CM

## 2020-01-02 DIAGNOSIS — I48.0 PAROXYSMAL ATRIAL FIBRILLATION: ICD-10-CM

## 2020-01-02 PROCEDURE — 99214 OFFICE O/P EST MOD 30 MIN: CPT

## 2020-01-02 RX ORDER — DOCUSATE SODIUM 100 MG/1
100 CAPSULE ORAL
Qty: 180 | Refills: 1 | Status: DISCONTINUED | COMMUNITY
Start: 2019-02-25 | End: 2020-01-02

## 2020-01-02 RX ORDER — PANTOPRAZOLE SODIUM 20 MG/1
TABLET, DELAYED RELEASE ORAL
Refills: 0 | Status: DISCONTINUED | COMMUNITY
End: 2020-01-02

## 2020-01-02 NOTE — HISTORY OF PRESENT ILLNESS
[FreeTextEntry1] : This 59-year-old, white female, who has a personal history of a colon polyp. 2 mm from the Ascending colon removed in 2015. H. pylori gastritis, and has previously been treated with antibiotic therapy and most recent urea breath test 8/19 were negative. Thus, the H. pylori infection has been resolved. Alleges that L. moments are reasonable and recurring nearly on a daily basis. Prior constipation had been treated with her laxative and Metamucil unsuccessfully. \par Fairly debilitated. She has end-stage renal disease and is on hemodialysis 3 times per week Monday, Wednesday, and Friday. Patient gets intermittent paracentesis when abdominal ascites accumulates. She was last tapped on 1216.3 L of fluid were removed. 3 Paracentesis were performed over  the last 12 months.\par Patient has a history of congestive heart failure and had previously been planned for an AICD. She had been wearing a Zoll device when last examined. Daughter states that this issue has very been resolved. No AICD has been implanted.  She is due for Cardiac reevaluation in  1.5 months with Dr Salazar.

## 2020-01-02 NOTE — CONSULT LETTER
[Consult Letter:] : I had the pleasure of evaluating your patient, [unfilled]. [Dear  ___] : Dear  [unfilled], [Consult Closing:] : Thank you very much for allowing me to participate in the care of this patient.  If you have any questions, please do not hesitate to contact me. [Please see my note below.] : Please see my note below. [Sincerely,] : Sincerely, [FreeTextEntry1] : Distant history of a very small tubular adenoma in the ascending colon, 2015. Patient's would normally be considered for a surveillance colonoscopy. This year. He debilitated. Recurrent abdominal ascites requiring paracentesis. 3 of advanced congestive heart failure. Await cardiac reevaluation for deciding upon a repeat surveillance colonoscopy.  [FreeTextEntry3] : Victor Hugo Gilbert MD FACG\par Diplomate American Board of Internal Medicine and Gastroenterolgy\par [facility]\par

## 2020-01-02 NOTE — ASSESSMENT
[FreeTextEntry1] : No history of liver disease. Ascites is due to inadequate dialysis. Currently, no significant ascites. Patient appears to be clinically dried and no additional GI intervention in regard to the ascites is currently necessary. She is anuric. \par Chronic constipationhas not been improved  with routine maneuvers. Advised adequate hydration and high-fiber diet.\par Prior H. pylori infection has been resolved. Most recent urea breath test has been negative.\par Personal history of a small tubular adenoma removed from the ascending colon into O15. A surveillance colonoscopy is indicated. This year. Patient appears to be considerably debilitated. Cardiac reevaluation will occur first on 2/25 and abdominal sonogram has been requested. Area. GI office followup care in 3 months to consider scheduling surveillance colonoscopy.

## 2020-01-02 NOTE — PHYSICAL EXAM
[General Appearance - In No Acute Distress] : in no acute distress [General Appearance - Alert] : alert [Sclera] : the sclera and conjunctiva were normal [PERRL With Normal Accommodation] : pupils were equal in size, round, and reactive to light [Extraocular Movements] : extraocular movements were intact [Oropharynx] : the oropharynx was normal [Outer Ear] : the ears and nose were normal in appearance [Neck Cervical Mass (___cm)] : no neck mass was observed [Neck Appearance] : the appearance of the neck was normal [Jugular Venous Distention Increased] : there was no jugular-venous distention [Thyroid Diffuse Enlargement] : the thyroid was not enlarged [Thyroid Nodule] : there were no palpable thyroid nodules [Auscultation Breath Sounds / Voice Sounds] : lungs were clear to auscultation bilaterally [Heart Rate And Rhythm] : heart rate was normal and rhythm regular [Heart Sounds] : normal S1 and S2 [Heart Sounds Gallop] : no gallops [Murmurs] : no murmurs [Bowel Sounds] : normal bowel sounds [Heart Sounds Pericardial Friction Rub] : no pericardial rub [Abdomen Soft] : soft [Abdomen Tenderness] : non-tender [] : no hepato-splenomegaly [Abdomen Mass (___ Cm)] : no abdominal mass palpated [Normal Sphincter Tone] : normal sphincter tone [No Rectal Mass] : no rectal mass [Internal Hemorrhoid] : no internal hemorrhoids [Occult Blood Positive] : stool was negative for occult blood [External Hemorrhoid] : no external hemorrhoids [FreeTextEntry1] : No hemorrhoids. No lesions. Normal tone. Brown soft stool. Occult blood negative.

## 2020-01-03 LAB — SURGICAL PATHOLOGY STUDY: SIGNIFICANT CHANGE UP

## 2020-01-06 LAB
CULTURE RESULTS: SIGNIFICANT CHANGE UP
SPECIMEN SOURCE: SIGNIFICANT CHANGE UP

## 2020-01-11 NOTE — H&P ADULT - NECK DETAILS
Nerve Block  Performed by: Marylin Gore MD  Authorized by: Marylin Gore MD     Block Type:  Lower Extremity  Lower Extremity:  Fascia iliaca  Patient Location:  Pre-op  Indication: post-op pain management and at surgeon's request    Surgeon:  Tawanda  patient identified, IV checked, risks and benefits discussed, surgical consent, monitors and equipment checked, pre-op evaluation and timeout performed    Patient Position:  Supine  Prep:  Chlorhexidine gluconate (CHG)  Max Sterile Barrier Technique:  Hand Washing, Cap/Mask, Sterile gloves, Sterile towel drapes and Sterile gel & probe cover  Monitoring:  Continuous pulse oximetry  Laterality:  Right  Injection Technique:  Single-shot  Procedures: ultrasound guided    Local Infiltration:  Ropivacaine  Strength:  0.2  Dose:  40  Needle Length:  9 cm  Physical status during block:  Awake  Injection Assessment:  Negative aspiration for heme, no paresthesia on injection, incremental injection and local visualized surrounding nerve on ultrasound  Patient Condition:  Tolerated well, no immediate complications  Heart Rate Change: No    Slowly Injected: Yes    Performed By:  Anesthesiologist  Anesthesiologist:  Marylin Gore MD         no palpable lymph nodes/supple/no JVD

## 2020-01-13 NOTE — PATIENT PROFILE ADULT - NSPROIMPLANTSMEDDEV_GEN_A_NUR
Product 47 Price (In Dollars - Numeric Only, No Special Characters Or $): 0.00 Product 42 Units: 0 Product 20 Price (In Dollars - Numeric Only, No Special Characters Or $): 48.00 Product 24 Application Directions: apply to affected areas qhs; retinoid handout given Product 10 Application Directions: apply bid Name Of Product 6: Neocutis Biocream Product 15 Price (In Dollars - Numeric Only, No Special Characters Or $): 150.00 Product 1 Price (In Dollars - Numeric Only, No Special Characters Or $): 110.00 Name Of Product 20: Avene Anti-Rogeurs FORT moisturizer Send Charges To Patient Encounter: Yes Product 5 Application Directions: qhs to face; retinoid handout given to patient Name Of Product 15: Sente Dermal Repair Cream Name Of Product 1: Skin Better Science AlphaRet Product 19 Application Directions: Apply qhs to face; retinoid handout given Product 10 Price (In Dollars - Numeric Only, No Special Characters Or $): 58.00 Name Of Product 10: SkinMedica Rejuvenative moisturizer Product 14 Application Directions: apply qd to face Product 28 Application Directions: apply bid to face and neck Product 19 Price (In Dollars - Numeric Only, No Special Characters Or $): 121.00 Product 5 Price (In Dollars - Numeric Only, No Special Characters Or $): 90.00 Name Of Product 24: Skin Better Science Alpha-Ret Intense Product 9 Application Directions: use to wash face twice a day Product 23 Application Directions: Use bid to moisturize face and neck Product 14 Price (In Dollars - Numeric Only, No Special Characters Or $): 225.00 Name Of Product 5: Skin Medica Retinol 1% Name Of Product 19: Environ C-Quorum Health 2 Detail Level: Zone Product 28 Price (In Dollars - Numeric Only, No Special Characters Or $): 129.00 Product 18 Application Directions: Apply bid to face and once a week to neck and chest Product 4 Application Directions: apply bid to eyelids Name Of Product 28: Skin Medica Dermal Repair Cream Name Of Product 14: Neocutis Bioserum Product 23 Price (In Dollars - Numeric Only, No Special Characters Or $): 165.00 Product 9 Price (In Dollars - Numeric Only, No Special Characters Or $): 20.00 Product 13 Application Directions: apply qd-bid around eyes Product 4 Price (In Dollars - Numeric Only, No Special Characters Or $): 80.00 Name Of Product 23: Juan J Lang Name Of Product 9: Avene cleansing foam None Product 27 Application Directions: use bid to wash face Product 18 Price (In Dollars - Numeric Only, No Special Characters Or $): 116.00 Name Of Product 18: Environ C-quence 1 Name Of Product 4: Neocutis Lumiere Eye Cream Product 13 Price (In Dollars - Numeric Only, No Special Characters Or $): 99.00 Product 27 Price (In Dollars - Numeric Only, No Special Characters Or $): 38.00 Product 3 Application Directions: qd Name Of Product 13: Дмитрий Lang Product 31 Application Directions: Apply to face once daily Product 22 Price (In Dollars - Numeric Only, No Special Characters Or $): 28.00 Product 8 Price (In Dollars - Numeric Only, No Special Characters Or $): 172.00 Name Of Product 27: Skin Medica Facial Cleanser Product 31 Units: 1 Name Of Product 22: Avene Anti-Rogeurs Cleanser Milk Name Of Product 8: TNS Recovery complex Product 26 Application Directions: Apply qam Product 3 Price (In Dollars - Numeric Only, No Special Characters Or $): 30.00 Product 31 Price (In Dollars - Numeric Only, No Special Characters Or $): 79.00 Name Of Product 3: Elta MD SPF 46 clear Name Of Product 17: Avene anti-rogeurs Cleansing Milk Product 21 Application Directions: use twice a day to moisturize face Name Of Product 31: Obagi soothing complex Product 12 Price (In Dollars - Numeric Only, No Special Characters Or $): 100.00 Product 26 Price (In Dollars - Numeric Only, No Special Characters Or $): 145.00 Product 16 Application Directions: bid to face Product 7 Price (In Dollars - Numeric Only, No Special Characters Or $): 38 Name Of Product 26: Alto Serum Name Of Product 12: Skin Better Science Eye cream Product 25 Application Directions: apply qhs to face; Retinoid handout given to patient Name Of Product 7: Avene Tolerance Extreme moisturizer Product 11 Application Directions: apply daily to face and neck Name Of Product 21: Avene Anti-Brittany FORT Product 2 Price (In Dollars - Numeric Only, No Special Characters Or $): 72 Product 6 Application Directions: apply qd-bid Name Of Product 16: Avene Clean-AC moisturizer Name Of Product 2: Obagi Hydrate Luxe face cream Product 25 Price (In Dollars - Numeric Only, No Special Characters Or $): 102.00 Product 15 Application Directions: Apply bid to face Product 1 Application Directions: apply qhs to face; retinoid handout given to patient Name Of Product 25: Tretinoin 0.1% cream Name Of Product 11: Obagi 20% vit C serum

## 2020-01-14 ENCOUNTER — INPATIENT (INPATIENT)
Facility: HOSPITAL | Age: 60
LOS: 2 days | Discharge: HOME | DRG: 947 | End: 2020-01-17
Attending: HOSPITALIST | Admitting: HOSPITALIST
Payer: MEDICARE

## 2020-01-14 VITALS
TEMPERATURE: 97 F | RESPIRATION RATE: 18 BRPM | SYSTOLIC BLOOD PRESSURE: 106 MMHG | DIASTOLIC BLOOD PRESSURE: 61 MMHG | HEART RATE: 67 BPM | OXYGEN SATURATION: 97 % | HEIGHT: 61 IN | WEIGHT: 164.91 LBS

## 2020-01-14 DIAGNOSIS — I77.0 ARTERIOVENOUS FISTULA, ACQUIRED: Chronic | ICD-10-CM

## 2020-01-14 DIAGNOSIS — R11.10 VOMITING, UNSPECIFIED: ICD-10-CM

## 2020-01-14 DIAGNOSIS — Z49.01 ENCOUNTER FOR FITTING AND ADJUSTMENT OF EXTRACORPOREAL DIALYSIS CATHETER: Chronic | ICD-10-CM

## 2020-01-14 LAB
ALBUMIN SERPL ELPH-MCNC: 2.9 G/DL — LOW (ref 3.3–5.2)
ALP SERPL-CCNC: 118 U/L — SIGNIFICANT CHANGE UP (ref 40–120)
ALT FLD-CCNC: <5 U/L — SIGNIFICANT CHANGE UP
AMMONIA BLD-MCNC: 21 UMOL/L — SIGNIFICANT CHANGE UP (ref 11–55)
ANION GAP SERPL CALC-SCNC: 14 MMOL/L — SIGNIFICANT CHANGE UP (ref 5–17)
AST SERPL-CCNC: 17 U/L — SIGNIFICANT CHANGE UP
BILIRUB SERPL-MCNC: 0.5 MG/DL — SIGNIFICANT CHANGE UP (ref 0.4–2)
BLD GP AB SCN SERPL QL: SIGNIFICANT CHANGE UP
BUN SERPL-MCNC: 41 MG/DL — HIGH (ref 8–20)
CALCIUM SERPL-MCNC: 9 MG/DL — SIGNIFICANT CHANGE UP (ref 8.6–10.2)
CHLORIDE SERPL-SCNC: 93 MMOL/L — LOW (ref 98–107)
CO2 SERPL-SCNC: 25 MMOL/L — SIGNIFICANT CHANGE UP (ref 22–29)
CREAT SERPL-MCNC: 6.02 MG/DL — HIGH (ref 0.5–1.3)
GLUCOSE SERPL-MCNC: 196 MG/DL — HIGH (ref 70–115)
HCT VFR BLD CALC: 29.8 % — LOW (ref 34.5–45)
HGB BLD-MCNC: 8.9 G/DL — LOW (ref 11.5–15.5)
LIDOCAIN IGE QN: 14 U/L — LOW (ref 22–51)
MCHC RBC-ENTMCNC: 26.9 PG — LOW (ref 27–34)
MCHC RBC-ENTMCNC: 29.9 GM/DL — LOW (ref 32–36)
MCV RBC AUTO: 90 FL — SIGNIFICANT CHANGE UP (ref 80–100)
PLATELET # BLD AUTO: 339 K/UL — SIGNIFICANT CHANGE UP (ref 150–400)
POTASSIUM SERPL-MCNC: 6.6 MMOL/L — CRITICAL HIGH (ref 3.5–5.3)
POTASSIUM SERPL-SCNC: 6.6 MMOL/L — CRITICAL HIGH (ref 3.5–5.3)
PROT SERPL-MCNC: 7.3 G/DL — SIGNIFICANT CHANGE UP (ref 6.6–8.7)
RBC # BLD: 3.31 M/UL — LOW (ref 3.8–5.2)
RBC # FLD: 16 % — HIGH (ref 10.3–14.5)
SODIUM SERPL-SCNC: 132 MMOL/L — LOW (ref 135–145)
WBC # BLD: 3.64 K/UL — LOW (ref 3.8–10.5)
WBC # FLD AUTO: 3.64 K/UL — LOW (ref 3.8–10.5)

## 2020-01-14 PROCEDURE — 99285 EMERGENCY DEPT VISIT HI MDM: CPT

## 2020-01-14 PROCEDURE — 74177 CT ABD & PELVIS W/CONTRAST: CPT | Mod: 26

## 2020-01-14 PROCEDURE — 99223 1ST HOSP IP/OBS HIGH 75: CPT

## 2020-01-14 RX ORDER — INSULIN LISPRO 100/ML
VIAL (ML) SUBCUTANEOUS
Refills: 0 | Status: DISCONTINUED | OUTPATIENT
Start: 2020-01-14 | End: 2020-01-17

## 2020-01-14 RX ORDER — ASPIRIN/CALCIUM CARB/MAGNESIUM 324 MG
81 TABLET ORAL DAILY
Refills: 0 | Status: DISCONTINUED | OUTPATIENT
Start: 2020-01-14 | End: 2020-01-17

## 2020-01-14 RX ORDER — DEXTROSE 50 % IN WATER 50 %
12.5 SYRINGE (ML) INTRAVENOUS ONCE
Refills: 0 | Status: DISCONTINUED | OUTPATIENT
Start: 2020-01-14 | End: 2020-01-17

## 2020-01-14 RX ORDER — GLUCAGON INJECTION, SOLUTION 0.5 MG/.1ML
1 INJECTION, SOLUTION SUBCUTANEOUS ONCE
Refills: 0 | Status: DISCONTINUED | OUTPATIENT
Start: 2020-01-14 | End: 2020-01-17

## 2020-01-14 RX ORDER — INSULIN LISPRO 100/ML
2 VIAL (ML) SUBCUTANEOUS
Refills: 0 | Status: DISCONTINUED | OUTPATIENT
Start: 2020-01-14 | End: 2020-01-17

## 2020-01-14 RX ORDER — DEXTROSE 50 % IN WATER 50 %
25 SYRINGE (ML) INTRAVENOUS ONCE
Refills: 0 | Status: DISCONTINUED | OUTPATIENT
Start: 2020-01-14 | End: 2020-01-17

## 2020-01-14 RX ORDER — CARVEDILOL PHOSPHATE 80 MG/1
12.5 CAPSULE, EXTENDED RELEASE ORAL EVERY 12 HOURS
Refills: 0 | Status: DISCONTINUED | OUTPATIENT
Start: 2020-01-14 | End: 2020-01-17

## 2020-01-14 RX ORDER — SACUBITRIL AND VALSARTAN 24; 26 MG/1; MG/1
1 TABLET, FILM COATED ORAL
Refills: 0 | Status: DISCONTINUED | OUTPATIENT
Start: 2020-01-14 | End: 2020-01-17

## 2020-01-14 RX ORDER — CALCIUM CARBONATE 500(1250)
1 TABLET ORAL THREE TIMES A DAY
Refills: 0 | Status: DISCONTINUED | OUTPATIENT
Start: 2020-01-14 | End: 2020-01-17

## 2020-01-14 RX ORDER — FERROUS SULFATE 325(65) MG
325 TABLET ORAL DAILY
Refills: 0 | Status: DISCONTINUED | OUTPATIENT
Start: 2020-01-14 | End: 2020-01-17

## 2020-01-14 RX ORDER — SODIUM CHLORIDE 9 MG/ML
1000 INJECTION, SOLUTION INTRAVENOUS
Refills: 0 | Status: DISCONTINUED | OUTPATIENT
Start: 2020-01-14 | End: 2020-01-17

## 2020-01-14 RX ORDER — ONDANSETRON 8 MG/1
4 TABLET, FILM COATED ORAL EVERY 8 HOURS
Refills: 0 | Status: DISCONTINUED | OUTPATIENT
Start: 2020-01-14 | End: 2020-01-17

## 2020-01-14 RX ORDER — ONDANSETRON 8 MG/1
4 TABLET, FILM COATED ORAL ONCE
Refills: 0 | Status: COMPLETED | OUTPATIENT
Start: 2020-01-14 | End: 2020-01-14

## 2020-01-14 RX ORDER — DEXTROSE 50 % IN WATER 50 %
15 SYRINGE (ML) INTRAVENOUS ONCE
Refills: 0 | Status: DISCONTINUED | OUTPATIENT
Start: 2020-01-14 | End: 2020-01-17

## 2020-01-14 RX ORDER — INSULIN LISPRO 100/ML
VIAL (ML) SUBCUTANEOUS AT BEDTIME
Refills: 0 | Status: DISCONTINUED | OUTPATIENT
Start: 2020-01-14 | End: 2020-01-17

## 2020-01-14 RX ORDER — ALBUTEROL 90 UG/1
10 AEROSOL, METERED ORAL ONCE
Refills: 0 | Status: COMPLETED | OUTPATIENT
Start: 2020-01-14 | End: 2020-01-14

## 2020-01-14 RX ORDER — DEXTROSE 50 % IN WATER 50 %
25 SYRINGE (ML) INTRAVENOUS ONCE
Refills: 0 | Status: COMPLETED | OUTPATIENT
Start: 2020-01-14 | End: 2020-01-14

## 2020-01-14 RX ORDER — INSULIN GLARGINE 100 [IU]/ML
4 INJECTION, SOLUTION SUBCUTANEOUS AT BEDTIME
Refills: 0 | Status: DISCONTINUED | OUTPATIENT
Start: 2020-01-14 | End: 2020-01-17

## 2020-01-14 RX ORDER — CALCITRIOL 0.5 UG/1
0.25 CAPSULE ORAL DAILY
Refills: 0 | Status: DISCONTINUED | OUTPATIENT
Start: 2020-01-14 | End: 2020-01-17

## 2020-01-14 RX ORDER — SENNA PLUS 8.6 MG/1
2 TABLET ORAL AT BEDTIME
Refills: 0 | Status: DISCONTINUED | OUTPATIENT
Start: 2020-01-14 | End: 2020-01-17

## 2020-01-14 RX ORDER — INSULIN HUMAN 100 [IU]/ML
4 INJECTION, SOLUTION SUBCUTANEOUS ONCE
Refills: 0 | Status: COMPLETED | OUTPATIENT
Start: 2020-01-14 | End: 2020-01-14

## 2020-01-14 RX ORDER — PANTOPRAZOLE SODIUM 20 MG/1
40 TABLET, DELAYED RELEASE ORAL
Refills: 0 | Status: DISCONTINUED | OUTPATIENT
Start: 2020-01-14 | End: 2020-01-17

## 2020-01-14 RX ADMIN — ONDANSETRON 4 MILLIGRAM(S): 8 TABLET, FILM COATED ORAL at 16:32

## 2020-01-14 NOTE — ED PROVIDER NOTE - CLINICAL SUMMARY MEDICAL DECISION MAKING FREE TEXT BOX
60 y/o F ESRD on HD presents for intractable vomiting and diarrhea since yesterday, dark stools, afebrile, + abdominal distension with fluid wave. Will evaluate for infectious etiology, patient missed HD yesterday therefore will require HD today. Will CT A/P to evaluate due to vomiting and diarrhea.

## 2020-01-14 NOTE — CONSULT NOTE ADULT - ASSESSMENT
ESRD - missed HD d/t vomiting/abd pain - on HD now  Hyperkalemia- Changed to 1 K bath  Htn   Dm  Ascitis  Abd pain/N/V - cause unclear - better ; watch  shall follow

## 2020-01-14 NOTE — ED ADULT NURSE NOTE - NSIMPLEMENTINTERV_GEN_ALL_ED
Implemented All Fall with Harm Risk Interventions:  [place] to call system. Call bell, personal items and telephone within reach. Instruct patient to call for assistance. Room bathroom lighting operational. Non-slip footwear when patient is off stretcher. Physically safe environment: no spills, clutter or unnecessary equipment. Stretcher in lowest position, wheels locked, appropriate side rails in place. Provide visual cue, wrist band, yellow gown, etc. Monitor gait and stability. Monitor for mental status changes and reorient to person, place, and time. Review medications for side effects contributing to fall risk. Reinforce activity limits and safety measures with patient and family. Provide visual clues: red socks.

## 2020-01-14 NOTE — H&P ADULT - HISTORY OF PRESENT ILLNESS
59 year old female w hx of ESRD on HD M-W-F L AVF, DM II, PAF on no AC due to GI bleed, chronic systolic HFrEF 35-40%, nonobstructive CAD, pericarditis s/p pericardiocentesis, recurrent ascites s/p multiple paracentesis prior 1month ago at [facility], s/p umbilical hernia repair on 11/13 who currently presented to the ED with worsening abdominal distension, nausea/ vomiting of coffee ground material with sequela of epigastric pain. complaining of abdominal pain with multiple episodes of vomiting and diarrhea since yesterday. She went to HD, but was not dialyzed because she was intractably vomiting. Daughter provides the history and notes that the diarrhea was black in color. The patient complains of diffuse abdominal pain. 58 y/o female with PMH of ESRD on HD M/W/F, DM II, PAF not on AC due to GI bleed, CHFrEF 35-40%, nonobstructive CAD, pericarditis s/p pericardiocentesis, recurrent ascites s/p multiple paracentesis came to the ED complaining of abdominal pain with multiple episodes of vomiting and diarrhea since yesterday. Patient went for her routine HD yesterday but it was not done due to intractably vomiting. Patient noted diffuse abdominal pain and dark stool. She has no shortness of breath, chest pain, palpitation, sick contact, recent travel, fever, chills, dizziness.

## 2020-01-14 NOTE — ED PROVIDER NOTE - NS ED ROS FT
Const: Denies fever, chills  HEENT: Denies blurry vision, sore throat  Neck: Denies neck pain/stiffness  Resp: Denies coughing, SOB  Cardiovascular: Denies CP, palpitations, LE edema  GI: + nausea, + vomiting, +abdominal pain, + diarrhea, Denies constipation  : Denies urinary frequency/urgency/dysuria, hematuria  MSK: Denies back pain  Neuro: Denies HA, dizziness, numbness, weakness  Skin: Denies rashes.

## 2020-01-14 NOTE — ED PROVIDER NOTE - PHYSICAL EXAMINATION
Const: Awake, alert and oriented. In no acute distress. Well appearing.  HEENT: NC/AT. Moist mucous membranes. Pale mucus membranes.  Eyes: No scleral icterus. EOMI.  Neck:. Soft and supple. Full ROM without pain.  Cardiac: Regular rate and regular rhythm. +S1/S2. No murmurs. AV fistula in left upper arm. No LE edema.  Resp: Speaking in full sentences. No evidence of respiratory distress. No wheezes, rales or rhonchi.  Abd: Soft, non-tender, distended with + fluid wave. Normal bowel sounds in all 4 quadrants. No guarding or rebound.  Back: Spine midline and non-tender. No CVAT.  Skin: No rashes, abrasions or lacerations.  Neuro: Awake, alert & oriented x 3. Moves all extremities symmetrically.

## 2020-01-14 NOTE — ED ADULT NURSE NOTE - OBJECTIVE STATEMENT
pt presents c/o abdominal pain with multiple episodes of vomiting and diarrhea since yesterday. She went to HD, but was not dialyzed because she was intractably vomiting. Daughter provides the history and notes that the diarrhea was black in color. The patient complains of diffuse abdominal pain. Today she had a sonogram which showed a large volume of ascites - she was tapped last month. The patient denies fevers, sore throat, coughing.

## 2020-01-14 NOTE — ED PROVIDER NOTE - OBJECTIVE STATEMENT
60 y/o F with PMH ESRD on HD (M/W/F), HTN, DM, PAF, HLD presents complaining of abdominal pain with multiple episodes of vomiting and diarrhea since yesterday. She went to HD, but was not dialyzed because she was intractably vomiting. Daughter provides the history and notes that the diarrhea was black in color. The patient complains of diffuse abdominal pain. Today she had a sonogram which showed a large volume of ascites - she was tapped last month. The patient denies fevers, sore throat, coughing. She has had chest pain in the past, but not currently. She does not know the name of her nephrologist. She denies allergies to medication.  PMD: Arleth Lopez

## 2020-01-14 NOTE — H&P ADULT - ASSESSMENT
58 y/o female with PMH of ESRD on HD M/W/F, DM II, PAF not on AC due to GI bleed, CHFrEF 35-40%, nonobstructive CAD, pericarditis s/p pericardiocentesis, recurrent ascites s/p multiple paracentesis came to the ED complaining of abdominal pain with multiple episodes of vomiting and diarrhea since yesterday. Patient went for her routine HD yesterday but it was not done due to intractably vomiting. Patient noted diffuse abdominal pain and dark stool. She has no shortness of breath, chest pain, palpitation, sick contact, recent travel, fever, chills, dizziness.     Abdominal pain   Admit to monitor bed   CT abdomen: large volume ascites   Patient will benefit from paracentesis but given low BP and now getting HD, will not be appropriate tonight   IR consult in AM   Pain control     Ascites   Plan as above     Intractable vomit   Patient noted improvement   Zofran PRN     Electrolyte abnormality   Likely due to dehydration and missed HD   Monitor renal function     ESRD   HD on M/W/F  Patient missed HD yesterday   Getting HD now   Monitor renal function   Renal on board     DM-2   Lantus  units   Lispro 2 units bid with meal   Insulin sliding scale     Anemia   H/H stable   Patient had EGD done during last admission, showed erosive gastritis   Continue iron pill   PPI 40mg   Monitor CBC     CHFrEF   Coreg 12.5mg bid with holding parameters   Entresto 49-51mg daily     Non-obstructive CAD   Aspirin 81mg     Supportive   DVT prophylaxis: CSD   Diet: renal

## 2020-01-14 NOTE — CONSULT NOTE ADULT - SUBJECTIVE AND OBJECTIVE BOX
Patient is a 59y old  Female who presents with a chief complaint of abd pain and vomiting for 1-2 days    HPI:  59 year old female w hx of ESRD on HD M-W-F L AVF, DM II, PAF on no AC due to GI bleed, chronic systolic HFrEF 35-40%, nonobstructive CAD, pericarditis s/p pericardiocentesis, recurrent ascites s/p multiple paracentesis prior 1month ago at [facility], s/p umbilical hernia repair on 11/13 who currently presented to the ED with worsening abdominal distension, nausea/ vomiting of coffee ground material with sequela of epigastric pain. complaining of abdominal pain with multiple episodes of vomiting and diarrhea since yesterday. She went to HD, but was not dialyzed because she was intractably vomiting. Daughter provides the history and notes that the diarrhea was black in color. The patient complains of diffuse abdominal pain. (14 Jan 2020 20:44)      PAST MEDICAL & SURGICAL HISTORY:  Coronary artery disease, angina presence unspecified, unspecified vessel or lesion type, unspecified whether native or transplanted heart  Paroxysmal atrial fibrillation  Anemia due to chronic kidney disease, unspecified CKD stage  Chronic systolic congestive heart failure  Pleural effusion  Pericardial effusion  End stage renal disease on dialysis  HLD (hyperlipidemia)  HTN (hypertension)  DM (diabetes mellitus)  A-V fistula  Encounter for dialysis catheter care      FAMILY HISTORY:  Family history of kidney disease in brother (Sibling)      Social History:   -smoke   - Alcohol   -    Drugs        REVIEW OF SYSTEMS:  CONSTITUTIONAL: No fever, weight loss, or fatigue  EYES: No eye pain, No visual disturbances,   RESPIRATORY: No cough, hemoptysis; - SOB  CARDIOVASCULAR: No chest pain, No palpitations,   -leg swelling  GASTROINTESTINAL: No abdominal , NVD or GIB  GENITOURINARY: No UTI sx/hematuria  NEUROLOGICAL: No HA/wk/numbness  MUSCULOSKELETAL: No joint pain, No swelling      Vital Signs Last 24 Hrs  T(C): 36.6 (14 Jan 2020 18:45), Max: 36.6 (14 Jan 2020 18:45)  T(F): 97.8 (14 Jan 2020 18:45), Max: 97.8 (14 Jan 2020 18:45)  HR: 66 (14 Jan 2020 18:45) (66 - 67)  BP: 112/66 (14 Jan 2020 18:45) (106/61 - 118/66)  BP(mean): --  RR: 18 (14 Jan 2020 18:45) (18 - 18)  SpO2: 95% (14 Jan 2020 18:45) (95% - 99%)    PHYSICAL EXAM:    GENERAL: NAD,   EYES:  conjunctiva and sclera clear  NECK: Supple, No JVD/Carotid Bruit -ve   NERVOUS SYSTEM:  A/O x3,   Lungs : No rales, No rhonchi,   HEART:  No murmur,  No rub, No gallops  ABDOMEN: Soft, NT/ND BS+  EXTREMITIES:  + Peripheral Pulses, - edema  SKIN: No rashes or lesions      LABS:                        8.9    3.64  )-----------( 339      ( 14 Jan 2020 16:36 )             29.8     01-14    132<L>  |  93<L>  |  41.0<H>  ----------------------------<  196<H>  6.6<HH>   |  25.0  |  6.02<H>    Ca    9.0      14 Jan 2020 16:36    TPro  7.3  /  Alb  2.9<L>  /  TBili  0.5  /  DBili  x   /  AST  17  /  ALT  <5  /  AlkPhos  118  01-14            RADIOLOGY & ADDITIONAL TESTS: CT Abd - mod ascitis;     MEDICATIONS  (STANDING):

## 2020-01-14 NOTE — ED ADULT NURSE REASSESSMENT NOTE - NS ED NURSE REASSESS COMMENT FT1
pt received from dialysis, resting comfortably on stretcher, NAD noted, respirations even and nonlabored. pt placed on portable monitor.

## 2020-01-14 NOTE — ED ADULT NURSE REASSESSMENT NOTE - NS ED NURSE REASSESS COMMENT FT1
Report given to HD RN, pt to go the ct scan than to HD. ( MD aware meds held because pt is going to HD )

## 2020-01-15 LAB
ANION GAP SERPL CALC-SCNC: 11 MMOL/L — SIGNIFICANT CHANGE UP (ref 5–17)
BUN SERPL-MCNC: 21 MG/DL — HIGH (ref 8–20)
CALCIUM SERPL-MCNC: 8.3 MG/DL — LOW (ref 8.6–10.2)
CHLORIDE SERPL-SCNC: 96 MMOL/L — LOW (ref 98–107)
CO2 SERPL-SCNC: 27 MMOL/L — SIGNIFICANT CHANGE UP (ref 22–29)
CREAT SERPL-MCNC: 3.92 MG/DL — HIGH (ref 0.5–1.3)
GLUCOSE BLDC GLUCOMTR-MCNC: 122 MG/DL — HIGH (ref 70–99)
GLUCOSE BLDC GLUCOMTR-MCNC: 130 MG/DL — HIGH (ref 70–99)
GLUCOSE BLDC GLUCOMTR-MCNC: 132 MG/DL — HIGH (ref 70–99)
GLUCOSE SERPL-MCNC: 58 MG/DL — LOW (ref 70–115)
HCT VFR BLD CALC: 27 % — LOW (ref 34.5–45)
HGB BLD-MCNC: 8.2 G/DL — LOW (ref 11.5–15.5)
MCHC RBC-ENTMCNC: 27.2 PG — SIGNIFICANT CHANGE UP (ref 27–34)
MCHC RBC-ENTMCNC: 30.4 GM/DL — LOW (ref 32–36)
MCV RBC AUTO: 89.7 FL — SIGNIFICANT CHANGE UP (ref 80–100)
PLATELET # BLD AUTO: 316 K/UL — SIGNIFICANT CHANGE UP (ref 150–400)
POTASSIUM SERPL-MCNC: 4.4 MMOL/L — SIGNIFICANT CHANGE UP (ref 3.5–5.3)
POTASSIUM SERPL-SCNC: 4.4 MMOL/L — SIGNIFICANT CHANGE UP (ref 3.5–5.3)
RBC # BLD: 3.01 M/UL — LOW (ref 3.8–5.2)
RBC # FLD: 15.9 % — HIGH (ref 10.3–14.5)
SODIUM SERPL-SCNC: 134 MMOL/L — LOW (ref 135–145)
WBC # BLD: 3.4 K/UL — LOW (ref 3.8–10.5)
WBC # FLD AUTO: 3.4 K/UL — LOW (ref 3.8–10.5)

## 2020-01-15 PROCEDURE — 99233 SBSQ HOSP IP/OBS HIGH 50: CPT

## 2020-01-15 RX ORDER — ERYTHROPOIETIN 10000 [IU]/ML
10000 INJECTION, SOLUTION INTRAVENOUS; SUBCUTANEOUS
Refills: 0 | Status: DISCONTINUED | OUTPATIENT
Start: 2020-01-15 | End: 2020-01-17

## 2020-01-15 RX ADMIN — INSULIN GLARGINE 4 UNIT(S): 100 INJECTION, SOLUTION SUBCUTANEOUS at 01:21

## 2020-01-15 RX ADMIN — CARVEDILOL PHOSPHATE 12.5 MILLIGRAM(S): 80 CAPSULE, EXTENDED RELEASE ORAL at 05:51

## 2020-01-15 RX ADMIN — Medication 2 UNIT(S): at 11:21

## 2020-01-15 RX ADMIN — Medication 81 MILLIGRAM(S): at 11:20

## 2020-01-15 RX ADMIN — Medication 1 TABLET(S): at 05:51

## 2020-01-15 RX ADMIN — CALCITRIOL 0.25 MICROGRAM(S): 0.5 CAPSULE ORAL at 11:20

## 2020-01-15 RX ADMIN — Medication 1 TABLET(S): at 17:42

## 2020-01-15 RX ADMIN — CARVEDILOL PHOSPHATE 12.5 MILLIGRAM(S): 80 CAPSULE, EXTENDED RELEASE ORAL at 17:42

## 2020-01-15 RX ADMIN — PANTOPRAZOLE SODIUM 40 MILLIGRAM(S): 20 TABLET, DELAYED RELEASE ORAL at 11:20

## 2020-01-15 RX ADMIN — Medication 1 TABLET(S): at 01:23

## 2020-01-15 RX ADMIN — Medication 1 TABLET(S): at 17:41

## 2020-01-15 RX ADMIN — SACUBITRIL AND VALSARTAN 1 TABLET(S): 24; 26 TABLET, FILM COATED ORAL at 17:42

## 2020-01-15 RX ADMIN — Medication 325 MILLIGRAM(S): at 11:20

## 2020-01-15 NOTE — PROGRESS NOTE ADULT - SUBJECTIVE AND OBJECTIVE BOX
JAROCHO MORALES    [id number]    59y      Female    CC: N/V    INTERVAL HPI/OVERNIGHT EVENTS: Pt seen and examined. Admitted yesterday with n/v, abd pain. Was unable to have routine HD yesterday due to n/v. Went for HD yesterday. C/o increasing abd distention. Denies nausea, vomiting today.     REVIEW OF SYSTEMS:  other ROS neg except as in HPI    Vital Signs Last 24 Hrs  T(C): 36.9 (15 Cleve 2020 15:37), Max: 37 (15 Cleve 2020 03:16)  T(F): 98.4 (15 Cleve 2020 15:37), Max: 98.6 (15 Cleve 2020 03:16)  HR: 69 (15 Cleve 2020 15:37) (66 - 70)  BP: 115/64 (15 Cleve 2020 15:37) (112/63 - 135/67)  BP(mean): --  RR: 19 (15 Cleve 2020 15:37) (16 - 19)  SpO2: 94% (15 Cleve 2020 15:37) (91% - 100%)    PHYSICAL EXAM:    GENERAL: NAD  HEENT: PERRL, +EOMI  NECK: soft, supple  CHEST/LUNG: Clear to auscultation bilaterally; No wheezing, non-labored respirations  HEART: S1S2+, Regular rate and rhythm; No murmurs, rubs, or gallops  ABDOMEN: Soft, non-tender; distended  SKIN: warm, dry  NEURO: alert and oriented, non-focal    LABS:                        8.2    3.40  )-----------( 316      ( 15 Cleve 2020 06:46 )             27.0     01-15    134<L>  |  96<L>  |  21.0<H>  ----------------------------<  58<L>  4.4   |  27.0  |  3.92<H>    Ca    8.3<L>      15 Cleve 2020 06:46    TPro  7.3  /  Alb  2.9<L>  /  TBili  0.5  /  DBili  x   /  AST  17  /  ALT  <5  /  AlkPhos  118  01-14        MEDICATIONS  (STANDING):  aspirin enteric coated 81 milliGRAM(s) Oral daily  calcitriol   Capsule 0.25 MICROGram(s) Oral daily  calcium carbonate   1250 mG (OsCal) 1 Tablet(s) Oral three times a day  carvedilol 12.5 milliGRAM(s) Oral every 12 hours  dextrose 5%. 1000 milliLiter(s) (50 mL/Hr) IV Continuous <Continuous>  dextrose 50% Injectable 12.5 Gram(s) IV Push once  dextrose 50% Injectable 25 Gram(s) IV Push once  dextrose 50% Injectable 25 Gram(s) IV Push once  epoetin gian Injectable 25805 Unit(s) IV Push <User Schedule>  ferrous    sulfate 325 milliGRAM(s) Oral daily  insulin glargine Injectable (LANTUS) 4 Unit(s) SubCutaneous at bedtime  insulin lispro (HumaLOG) corrective regimen sliding scale   SubCutaneous three times a day before meals  insulin lispro (HumaLOG) corrective regimen sliding scale   SubCutaneous at bedtime  insulin lispro Injectable (HumaLOG) 2 Unit(s) SubCutaneous before breakfast  insulin lispro Injectable (HumaLOG) 2 Unit(s) SubCutaneous before lunch  Nephro-mariann 1 Tablet(s) Oral daily  pantoprazole    Tablet 40 milliGRAM(s) Oral before breakfast  sacubitril 49 mG/valsartan 51 mG 1 Tablet(s) Oral <User Schedule>    MEDICATIONS  (PRN):  dextrose 40% Gel 15 Gram(s) Oral once PRN Blood Glucose LESS THAN 70 milliGRAM(s)/deciliter  glucagon  Injectable 1 milliGRAM(s) IntraMuscular once PRN Glucose LESS THAN 70 milligrams/deciliter  ondansetron Injectable 4 milliGRAM(s) IV Push every 8 hours PRN Nausea and/or Vomiting  senna 2 Tablet(s) Oral at bedtime PRN Constipation      RADIOLOGY & ADDITIONAL TESTS:

## 2020-01-15 NOTE — PROGRESS NOTE ADULT - ASSESSMENT
59 year old female PMH ESRD on HD (MWF), DMII, pAF not on AC due to GI bleed, CHFrEF 35-40%, nonobstructive CAD, pericarditis s/p pericardiocentesis, recurrent ascites s/p multiple paracenteses presented c/o abd pain with n/v, diarrhea. Went for routine HD but was unable to have done due to intractable vomiting. Pt noted to have diffuse abd pain and dark stool.     Abdominal pain  -likely 2/2 increasing ascites  -CT abd with large volume ascites  -d/w IR, plan for paracentesis tomorrow 1/16  -pain control     Intractable vomiting  -improved, no vomiting today  -cont zofran PRN    ESRD on HD  -HD yesterday 1/14  -nephro following    DM2  -cont Lantus  -ISS    Anemia  -monitor H/H, stable  -EGD last admission (12/15-12/18) showed erosive gastritis  -cont PO iron  -cont PPI    CHFrEF   -cont coreg 12.5mg BID  -cont Entresto 49-51mg 59 year old female PMH ESRD on HD (MWF), DMII, pAF not on AC due to GI bleed, CHFrEF 35-40%, nonobstructive CAD, pericarditis s/p pericardiocentesis, recurrent ascites s/p multiple paracenteses presented c/o abd pain with n/v, diarrhea. Went for routine HD but was unable to have done due to intractable vomiting. Pt noted to have diffuse abd pain and dark stool.     Abdominal pain  -likely 2/2 increasing ascites  -CT abd with large volume ascites  -d/w IR, plan for paracentesis tomorrow 1/16  -pain control     Intractable vomiting  -improved, no vomiting today  -cont zofran PRN    ESRD on HD  -HD yesterday 1/14  -nephro following    DM2  -cont Lantus  -ISS    Anemia  -monitor H/H, stable  -EGD last admission (12/15-12/18) showed erosive gastritis  -cont PO iron  -cont PPI    CHFrEF   -cont coreg 12.5mg BID  -cont Entresto 49-51mg    CAD non-obstructive  -cont asa 81mg

## 2020-01-15 NOTE — ED ADULT NURSE REASSESSMENT NOTE - COMFORT CARE
warm blanket provided/wait time explained/side rails down/plan of care explained/darkened lights/meal provided/po fluids offered/repositioned

## 2020-01-15 NOTE — ED ADULT NURSE REASSESSMENT NOTE - NS ED NURSE REASSESS COMMENT FT1
received pt from off-going shift. pt a&ox3, denies any pain/discomfort. lt avf +thrill/bruiy, site c/d/i. abd distended, soft, nontender, +bs. pending admission. updated on plan of care, verbalize understanding. call bell in reach

## 2020-01-15 NOTE — PROGRESS NOTE ADULT - SUBJECTIVE AND OBJECTIVE BOX
NEPHROLOGY INTERVAL HPI/OVERNIGHT EVENTS:     No new events.    MEDICATIONS  (STANDING):  aspirin enteric coated 81 milliGRAM(s) Oral daily  calcitriol   Capsule 0.25 MICROGram(s) Oral daily  calcium carbonate   1250 mG (OsCal) 1 Tablet(s) Oral three times a day  carvedilol 12.5 milliGRAM(s) Oral every 12 hours  dextrose 5%. 1000 milliLiter(s) (50 mL/Hr) IV Continuous <Continuous>  dextrose 50% Injectable 12.5 Gram(s) IV Push once  dextrose 50% Injectable 25 Gram(s) IV Push once  dextrose 50% Injectable 25 Gram(s) IV Push once  ferrous    sulfate 325 milliGRAM(s) Oral daily  insulin glargine Injectable (LANTUS) 4 Unit(s) SubCutaneous at bedtime  insulin lispro (HumaLOG) corrective regimen sliding scale   SubCutaneous three times a day before meals  insulin lispro (HumaLOG) corrective regimen sliding scale   SubCutaneous at bedtime  insulin lispro Injectable (HumaLOG) 2 Unit(s) SubCutaneous before breakfast  insulin lispro Injectable (HumaLOG) 2 Unit(s) SubCutaneous before lunch  Nephro-mariann 1 Tablet(s) Oral daily  pantoprazole    Tablet 40 milliGRAM(s) Oral before breakfast  sacubitril 49 mG/valsartan 51 mG 1 Tablet(s) Oral <User Schedule>    MEDICATIONS  (PRN):  dextrose 40% Gel 15 Gram(s) Oral once PRN Blood Glucose LESS THAN 70 milliGRAM(s)/deciliter  glucagon  Injectable 1 milliGRAM(s) IntraMuscular once PRN Glucose LESS THAN 70 milligrams/deciliter  ondansetron Injectable 4 milliGRAM(s) IV Push every 8 hours PRN Nausea and/or Vomiting  senna 2 Tablet(s) Oral at bedtime PRN Constipation      Allergies    eggs (Anaphylaxis)  No Known Drug Allergies          Vital Signs Last 24 Hrs  T(C): 37 (15 Cleve 2020 03:16), Max: 37 (15 Cleve 2020 03:16)  T(F): 98.6 (15 Cleve 2020 03:16), Max: 98.6 (15 Cleve 2020 03:16)  HR: 68 (15 Cleve 2020 06:10) (66 - 69)  BP: 112/63 (15 Cleve 2020 06:10) (106/61 - 135/67)  BP(mean): --  RR: 17 (15 Cleve 2020 06:10) (17 - 18)  SpO2: 100% (15 Cleve 2020 06:10) (91% - 100%)  Daily Height in cm: 154.94 (2020 12:10)    Daily Weight in k.8 (2020 22:00)    PHYSICAL EXAM:    GENERAL: Appears chronically ill, in bed  HEAD:  wnl  EYES: wnl  ENMT:   NECK: veins full  NERVOUS SYSTEM:  alert, verbal  CHEST/LUNG: No 02, no wheezes, decreased bs bases  HEART: no rub  ABDOMEN: distended, ascites  EXTREMITIES:  left arm access with bruit  LYMPH:   SKIN: pale    LABS:                        8.2    3.40  )-----------( 316      ( 15 Cleve 2020 06:46 )             27.0     01-15    134<L>  |  96<L>  |  21.0<H>  ----------------------------<  58<L>  4.4   |  27.0  |  3.92<H>    Ca    8.3<L>      15 Cleve 2020 06:46    TPro  7.3  /  Alb  2.9<L>  /  TBili  0.5  /  DBili  x   /  AST  17  /  ALT  <5  /  AlkPhos  118  01-14                RADIOLOGY & ADDITIONAL TESTS:

## 2020-01-15 NOTE — ED ADULT NURSE REASSESSMENT NOTE - NS ED NURSE REASSESS COMMENT FT1
pt transported to US with transporter. RENATE RN from endo called for report. report given, pt to go to endo after US.  with patient

## 2020-01-16 LAB
ANION GAP SERPL CALC-SCNC: 13 MMOL/L — SIGNIFICANT CHANGE UP (ref 5–17)
B PERT IGG+IGM PNL SER: CLEAR — SIGNIFICANT CHANGE UP
BLD GP AB SCN SERPL QL: SIGNIFICANT CHANGE UP
BUN SERPL-MCNC: 30 MG/DL — HIGH (ref 8–20)
CALCIUM SERPL-MCNC: 8.1 MG/DL — LOW (ref 8.6–10.2)
CHLORIDE SERPL-SCNC: 98 MMOL/L — SIGNIFICANT CHANGE UP (ref 98–107)
CO2 SERPL-SCNC: 26 MMOL/L — SIGNIFICANT CHANGE UP (ref 22–29)
COLOR FLD: ABNORMAL
CREAT SERPL-MCNC: 5.01 MG/DL — HIGH (ref 0.5–1.3)
EOSINOPHIL # FLD: 1 % — SIGNIFICANT CHANGE UP
FLUID INTAKE SUBSTANCE CLASS: SIGNIFICANT CHANGE UP
FLUID SEGMENTED GRANULOCYTES: 13 % — SIGNIFICANT CHANGE UP
GLUCOSE BLDC GLUCOMTR-MCNC: 141 MG/DL — HIGH (ref 70–99)
GLUCOSE BLDC GLUCOMTR-MCNC: 196 MG/DL — HIGH (ref 70–99)
GLUCOSE BLDC GLUCOMTR-MCNC: 80 MG/DL — SIGNIFICANT CHANGE UP (ref 70–99)
GLUCOSE BLDC GLUCOMTR-MCNC: 88 MG/DL — SIGNIFICANT CHANGE UP (ref 70–99)
GLUCOSE BLDC GLUCOMTR-MCNC: 95 MG/DL — SIGNIFICANT CHANGE UP (ref 70–99)
GLUCOSE SERPL-MCNC: 77 MG/DL — SIGNIFICANT CHANGE UP (ref 70–115)
HCT VFR BLD CALC: 25.6 % — LOW (ref 34.5–45)
HGB BLD-MCNC: 7.5 G/DL — LOW (ref 11.5–15.5)
LYMPHOCYTES # FLD: 14 % — SIGNIFICANT CHANGE UP
MCHC RBC-ENTMCNC: 26.3 PG — LOW (ref 27–34)
MCHC RBC-ENTMCNC: 29.3 GM/DL — LOW (ref 32–36)
MCV RBC AUTO: 89.8 FL — SIGNIFICANT CHANGE UP (ref 80–100)
MONOS+MACROS # FLD: 72 % — SIGNIFICANT CHANGE UP
PLATELET # BLD AUTO: 298 K/UL — SIGNIFICANT CHANGE UP (ref 150–400)
POTASSIUM SERPL-MCNC: 5 MMOL/L — SIGNIFICANT CHANGE UP (ref 3.5–5.3)
POTASSIUM SERPL-SCNC: 5 MMOL/L — SIGNIFICANT CHANGE UP (ref 3.5–5.3)
RBC # BLD: 2.85 M/UL — LOW (ref 3.8–5.2)
RBC # FLD: 15.8 % — HIGH (ref 10.3–14.5)
RCV VOL RI: 185 /UL — HIGH (ref 0–0)
SODIUM SERPL-SCNC: 137 MMOL/L — SIGNIFICANT CHANGE UP (ref 135–145)
TOTAL NUCLEATED CELL COUNT, BODY FLUID: 374 /UL — SIGNIFICANT CHANGE UP
TUBE TYPE: SIGNIFICANT CHANGE UP
WBC # BLD: 3.54 K/UL — LOW (ref 3.8–10.5)
WBC # FLD AUTO: 3.54 K/UL — LOW (ref 3.8–10.5)

## 2020-01-16 PROCEDURE — 49083 ABD PARACENTESIS W/IMAGING: CPT

## 2020-01-16 PROCEDURE — 99232 SBSQ HOSP IP/OBS MODERATE 35: CPT

## 2020-01-16 RX ADMIN — CARVEDILOL PHOSPHATE 12.5 MILLIGRAM(S): 80 CAPSULE, EXTENDED RELEASE ORAL at 18:15

## 2020-01-16 RX ADMIN — CARVEDILOL PHOSPHATE 12.5 MILLIGRAM(S): 80 CAPSULE, EXTENDED RELEASE ORAL at 05:27

## 2020-01-16 RX ADMIN — Medication 1 TABLET(S): at 22:09

## 2020-01-16 RX ADMIN — Medication 1 TABLET(S): at 00:02

## 2020-01-16 RX ADMIN — SACUBITRIL AND VALSARTAN 1 TABLET(S): 24; 26 TABLET, FILM COATED ORAL at 07:59

## 2020-01-16 RX ADMIN — ERYTHROPOIETIN 10000 UNIT(S): 10000 INJECTION, SOLUTION INTRAVENOUS; SUBCUTANEOUS at 13:08

## 2020-01-16 RX ADMIN — INSULIN GLARGINE 4 UNIT(S): 100 INJECTION, SOLUTION SUBCUTANEOUS at 00:02

## 2020-01-16 RX ADMIN — PANTOPRAZOLE SODIUM 40 MILLIGRAM(S): 20 TABLET, DELAYED RELEASE ORAL at 05:27

## 2020-01-16 RX ADMIN — Medication 1 TABLET(S): at 17:55

## 2020-01-16 RX ADMIN — Medication 2 UNIT(S): at 13:17

## 2020-01-16 RX ADMIN — Medication 1 TABLET(S): at 05:27

## 2020-01-16 RX ADMIN — INSULIN GLARGINE 4 UNIT(S): 100 INJECTION, SOLUTION SUBCUTANEOUS at 22:08

## 2020-01-16 RX ADMIN — CALCITRIOL 0.25 MICROGRAM(S): 0.5 CAPSULE ORAL at 17:55

## 2020-01-16 RX ADMIN — Medication 325 MILLIGRAM(S): at 17:56

## 2020-01-16 NOTE — PROGRESS NOTE ADULT - SUBJECTIVE AND OBJECTIVE BOX
NEPHROLOGY INTERVAL HPI/OVERNIGHT EVENTS:     No new events.    MEDICATIONS  (STANDING):  aspirin enteric coated 81 milliGRAM(s) Oral daily  calcitriol   Capsule 0.25 MICROGram(s) Oral daily  calcium carbonate   1250 mG (OsCal) 1 Tablet(s) Oral three times a day  carvedilol 12.5 milliGRAM(s) Oral every 12 hours  dextrose 5%. 1000 milliLiter(s) (50 mL/Hr) IV Continuous <Continuous>  dextrose 50% Injectable 12.5 Gram(s) IV Push once  dextrose 50% Injectable 25 Gram(s) IV Push once  dextrose 50% Injectable 25 Gram(s) IV Push once  ferrous    sulfate 325 milliGRAM(s) Oral daily  insulin glargine Injectable (LANTUS) 4 Unit(s) SubCutaneous at bedtime  insulin lispro (HumaLOG) corrective regimen sliding scale   SubCutaneous three times a day before meals  insulin lispro (HumaLOG) corrective regimen sliding scale   SubCutaneous at bedtime  insulin lispro Injectable (HumaLOG) 2 Unit(s) SubCutaneous before breakfast  insulin lispro Injectable (HumaLOG) 2 Unit(s) SubCutaneous before lunch  Nephro-mariann 1 Tablet(s) Oral daily  pantoprazole    Tablet 40 milliGRAM(s) Oral before breakfast  sacubitril 49 mG/valsartan 51 mG 1 Tablet(s) Oral <User Schedule>    MEDICATIONS  (PRN):  dextrose 40% Gel 15 Gram(s) Oral once PRN Blood Glucose LESS THAN 70 milliGRAM(s)/deciliter  glucagon  Injectable 1 milliGRAM(s) IntraMuscular once PRN Glucose LESS THAN 70 milligrams/deciliter  ondansetron Injectable 4 milliGRAM(s) IV Push every 8 hours PRN Nausea and/or Vomiting  senna 2 Tablet(s) Oral at bedtime PRN Constipation      Allergies    eggs (Anaphylaxis)  No Known Drug Allergies      Vital Signs Last 24 Hrs  T(C): 37 (16 Jan 2020 07:25), Max: 37.1 (16 Jan 2020 04:39)  T(F): 98.6 (16 Jan 2020 07:25), Max: 98.7 (16 Jan 2020 04:39)  HR: 71 (16 Jan 2020 07:25) (67 - 72)  BP: 119/66 (16 Jan 2020 07:25) (113/57 - 123/67)  BP(mean): --  RR: 20 (16 Jan 2020 07:25) (16 - 20)  SpO2: 94% (16 Jan 2020 07:25) (93% - 100%)  T(C): 37 (15 Cleve 2020 03:16), Max: 37 (15 Cleve 2020 03:16)  T(F): 98.6 (15 Cleve 2020 03:16), Max: 98.6 (15 Cleve 2020 03:16)  HR: 68 (15 Cleve 2020 06:10) (66 - 69)  BP: 112/63 (15 Cleve 2020 06:10) (106/61 - 135/67)    PHYSICAL EXAM:    GENERAL: Appears chronically ill, in bed  HEAD:  wnl  EYES: wnl  ENMT:   NECK: veins full  NERVOUS SYSTEM:  alert, verbal  CHEST/LUNG: No 02, no wheezes, decreased bs bases  HEART: no rub  ABDOMEN: distended, ascites, NT  EXTREMITIES:  left arm access with bruit  LYMPH:   SKIN: pale    LABS:                        8.2    3.40  )-----------( 316      ( 15 Cleve 2020 06:46 )             27.0     01-15    134<L>  |  96<L>  |  21.0<H>  ----------------------------<  58<L>  4.4   |  27.0  |  3.92<H>    Ca    8.3<L>      15 Cleve 2020 06:46    TPro  7.3  /  Alb  2.9<L>  /  TBili  0.5  /  DBili  x   /  AST  17  /  ALT  <5  /  AlkPhos  118  01-14                RADIOLOGY & ADDITIONAL TESTS:

## 2020-01-16 NOTE — PROGRESS NOTE ADULT - SUBJECTIVE AND OBJECTIVE BOX
JAROCHO MORALES    [id number]    59y      Female    CC: N/V    INTERVAL HPI/OVERNIGHT EVENTS: Pt seen and examined at dialysis. Denies n/v, abd pain.     REVIEW OF SYSTEMS:    CONSTITUTIONAL: No fever, weight loss, or fatigue  RESPIRATORY: No cough, wheezing, hemoptysis; No shortness of breath  CARDIOVASCULAR: No chest pain, palpitations  GASTROINTESTINAL: No abdominal or epigastric pain. No nausea, vomiting  NEUROLOGICAL: No headaches, memory loss, loss of strength.    Vital Signs Last 24 Hrs  T(C): 37.2 (16 Jan 2020 10:14), Max: 37.2 (16 Jan 2020 10:14)  T(F): 99 (16 Jan 2020 10:14), Max: 99 (16 Jan 2020 10:14)  HR: 73 (16 Jan 2020 10:14) (69 - 73)  BP: 127/62 (16 Jan 2020 10:14) (113/57 - 127/62)  BP(mean): --  RR: 18 (16 Jan 2020 10:14) (18 - 20)  SpO2: 95% (16 Jan 2020 10:14) (93% - 97%)    PHYSICAL EXAM:    GENERAL: NAD  HEENT: PERRL, +EOMI  NECK: soft, supple  CHEST/LUNG: Clear to auscultation bilaterally; No wheezing  HEART: S1S2+, Regular rate and rhythm; No murmurs, rubs, or gallops  ABDOMEN: Soft, Nontender, Nondistended; Bowel sounds present  SKIN: No rashes or lesions  NEURO: AAOX3, no focal deficits, no motor or sensory loss  PSYCH: normal mood    LABS:                        7.5    3.54  )-----------( 298      ( 16 Jan 2020 07:31 )             25.6     01-16    137  |  98  |  30.0<H>  ----------------------------<  77  5.0   |  26.0  |  5.01<H>    Ca    8.1<L>      16 Jan 2020 07:31    TPro  7.3  /  Alb  2.9<L>  /  TBili  0.5  /  DBili  x   /  AST  17  /  ALT  <5  /  AlkPhos  118  01-14            MEDICATIONS  (STANDING):  aspirin enteric coated 81 milliGRAM(s) Oral daily  calcitriol   Capsule 0.25 MICROGram(s) Oral daily  calcium carbonate   1250 mG (OsCal) 1 Tablet(s) Oral three times a day  carvedilol 12.5 milliGRAM(s) Oral every 12 hours  dextrose 5%. 1000 milliLiter(s) (50 mL/Hr) IV Continuous <Continuous>  dextrose 50% Injectable 12.5 Gram(s) IV Push once  dextrose 50% Injectable 25 Gram(s) IV Push once  dextrose 50% Injectable 25 Gram(s) IV Push once  epoetin gian Injectable 47686 Unit(s) IV Push <User Schedule>  ferrous    sulfate 325 milliGRAM(s) Oral daily  insulin glargine Injectable (LANTUS) 4 Unit(s) SubCutaneous at bedtime  insulin lispro (HumaLOG) corrective regimen sliding scale   SubCutaneous three times a day before meals  insulin lispro (HumaLOG) corrective regimen sliding scale   SubCutaneous at bedtime  insulin lispro Injectable (HumaLOG) 2 Unit(s) SubCutaneous before breakfast  insulin lispro Injectable (HumaLOG) 2 Unit(s) SubCutaneous before lunch  Nephro-mariann 1 Tablet(s) Oral daily  pantoprazole    Tablet 40 milliGRAM(s) Oral before breakfast  sacubitril 49 mG/valsartan 51 mG 1 Tablet(s) Oral <User Schedule>    MEDICATIONS  (PRN):  dextrose 40% Gel 15 Gram(s) Oral once PRN Blood Glucose LESS THAN 70 milliGRAM(s)/deciliter  glucagon  Injectable 1 milliGRAM(s) IntraMuscular once PRN Glucose LESS THAN 70 milligrams/deciliter  ondansetron Injectable 4 milliGRAM(s) IV Push every 8 hours PRN Nausea and/or Vomiting  senna 2 Tablet(s) Oral at bedtime PRN Constipation      RADIOLOGY & ADDITIONAL TESTS: JAROCHO MORALES    [id number]    59y      Female    CC: N/V    INTERVAL HPI/OVERNIGHT EVENTS: Pt seen and examined at dialysis. Denies n/v, abd pain. Only c/o increasing distention.     REVIEW OF SYSTEMS:  all other ROS negative except as in HPI    Vital Signs Last 24 Hrs  T(C): 37.2 (16 Jan 2020 10:14), Max: 37.2 (16 Jan 2020 10:14)  T(F): 99 (16 Jan 2020 10:14), Max: 99 (16 Jan 2020 10:14)  HR: 73 (16 Jan 2020 10:14) (69 - 73)  BP: 127/62 (16 Jan 2020 10:14) (113/57 - 127/62)  BP(mean): --  RR: 18 (16 Jan 2020 10:14) (18 - 20)  SpO2: 95% (16 Jan 2020 10:14) (93% - 97%)    PHYSICAL EXAM:    GENERAL: NAD  HEENT: PERRL, +EOMI  NECK: soft, supple  CHEST/LUNG: Clear to auscultation bilaterally; No wheezing, non-labored respirations  HEART: S1S2+, Regular rate and rhythm; No murmurs, rubs, or gallops  ABDOMEN: Soft, non-tender; distended  SKIN: warm, dry  NEURO: alert and oriented, non-focal    LABS:                        7.5    3.54  )-----------( 298      ( 16 Jan 2020 07:31 )             25.6     01-16    137  |  98  |  30.0<H>  ----------------------------<  77  5.0   |  26.0  |  5.01<H>    Ca    8.1<L>      16 Jan 2020 07:31    TPro  7.3  /  Alb  2.9<L>  /  TBili  0.5  /  DBili  x   /  AST  17  /  ALT  <5  /  AlkPhos  118  01-14            MEDICATIONS  (STANDING):  aspirin enteric coated 81 milliGRAM(s) Oral daily  calcitriol   Capsule 0.25 MICROGram(s) Oral daily  calcium carbonate   1250 mG (OsCal) 1 Tablet(s) Oral three times a day  carvedilol 12.5 milliGRAM(s) Oral every 12 hours  dextrose 5%. 1000 milliLiter(s) (50 mL/Hr) IV Continuous <Continuous>  dextrose 50% Injectable 12.5 Gram(s) IV Push once  dextrose 50% Injectable 25 Gram(s) IV Push once  dextrose 50% Injectable 25 Gram(s) IV Push once  epoetin gian Injectable 64461 Unit(s) IV Push <User Schedule>  ferrous    sulfate 325 milliGRAM(s) Oral daily  insulin glargine Injectable (LANTUS) 4 Unit(s) SubCutaneous at bedtime  insulin lispro (HumaLOG) corrective regimen sliding scale   SubCutaneous three times a day before meals  insulin lispro (HumaLOG) corrective regimen sliding scale   SubCutaneous at bedtime  insulin lispro Injectable (HumaLOG) 2 Unit(s) SubCutaneous before breakfast  insulin lispro Injectable (HumaLOG) 2 Unit(s) SubCutaneous before lunch  Nephro-mariann 1 Tablet(s) Oral daily  pantoprazole    Tablet 40 milliGRAM(s) Oral before breakfast  sacubitril 49 mG/valsartan 51 mG 1 Tablet(s) Oral <User Schedule>    MEDICATIONS  (PRN):  dextrose 40% Gel 15 Gram(s) Oral once PRN Blood Glucose LESS THAN 70 milliGRAM(s)/deciliter  glucagon  Injectable 1 milliGRAM(s) IntraMuscular once PRN Glucose LESS THAN 70 milligrams/deciliter  ondansetron Injectable 4 milliGRAM(s) IV Push every 8 hours PRN Nausea and/or Vomiting  senna 2 Tablet(s) Oral at bedtime PRN Constipation      RADIOLOGY & ADDITIONAL TESTS:

## 2020-01-16 NOTE — PROGRESS NOTE ADULT - ASSESSMENT
59 year old female PMH ESRD on HD (MWF), DMII, pAF not on AC due to GI bleed, CHFrEF 35-40%, nonobstructive CAD, pericarditis s/p pericardiocentesis, recurrent ascites s/p multiple paracenteses presented c/o abd pain with n/v, diarrhea. Went for routine HD but was unable to have done due to intractable vomiting. Pt noted to have diffuse abd pain and dark stool.     Abdominal pain  -likely 2/2 increasing ascites  -CT abd with large volume ascites  -d/w IR, plan for paracentesis today  -pain control     Intractable vomiting  -improved, no vomiting today  -cont zofran PRN    ESRD on HD  -HD today  -nephro following    DM2  -cont Lantus  -ISS    Anemia  -monitor H/H, stable  -EGD last admission (12/15-12/18) showed erosive gastritis  -cont PO iron  -cont PPI  -transfused 1unit PRBC with HD today    CHFrEF   -cont coreg 12.5mg BID  -cont Entresto 49-51mg    CAD non-obstructive  -cont asa 81mg

## 2020-01-17 ENCOUNTER — TRANSCRIPTION ENCOUNTER (OUTPATIENT)
Age: 60
End: 2020-01-17

## 2020-01-17 VITALS
OXYGEN SATURATION: 96 % | TEMPERATURE: 98 F | DIASTOLIC BLOOD PRESSURE: 67 MMHG | HEART RATE: 71 BPM | RESPIRATION RATE: 16 BRPM | SYSTOLIC BLOOD PRESSURE: 133 MMHG

## 2020-01-17 LAB
ANION GAP SERPL CALC-SCNC: 11 MMOL/L — SIGNIFICANT CHANGE UP (ref 5–17)
BUN SERPL-MCNC: 20 MG/DL — SIGNIFICANT CHANGE UP (ref 8–20)
CALCIUM SERPL-MCNC: 8.1 MG/DL — LOW (ref 8.6–10.2)
CHLORIDE SERPL-SCNC: 95 MMOL/L — LOW (ref 98–107)
CO2 SERPL-SCNC: 27 MMOL/L — SIGNIFICANT CHANGE UP (ref 22–29)
CREAT SERPL-MCNC: 4.08 MG/DL — HIGH (ref 0.5–1.3)
GLUCOSE BLDC GLUCOMTR-MCNC: 105 MG/DL — HIGH (ref 70–99)
GLUCOSE BLDC GLUCOMTR-MCNC: 181 MG/DL — HIGH (ref 70–99)
GLUCOSE BLDC GLUCOMTR-MCNC: 71 MG/DL — SIGNIFICANT CHANGE UP (ref 70–99)
GLUCOSE SERPL-MCNC: 122 MG/DL — HIGH (ref 70–115)
HCT VFR BLD CALC: 32 % — LOW (ref 34.5–45)
HGB BLD-MCNC: 10.1 G/DL — LOW (ref 11.5–15.5)
MCHC RBC-ENTMCNC: 27.7 PG — SIGNIFICANT CHANGE UP (ref 27–34)
MCHC RBC-ENTMCNC: 31.6 GM/DL — LOW (ref 32–36)
MCV RBC AUTO: 87.7 FL — SIGNIFICANT CHANGE UP (ref 80–100)
PLATELET # BLD AUTO: 307 K/UL — SIGNIFICANT CHANGE UP (ref 150–400)
POTASSIUM SERPL-MCNC: 4.3 MMOL/L — SIGNIFICANT CHANGE UP (ref 3.5–5.3)
POTASSIUM SERPL-SCNC: 4.3 MMOL/L — SIGNIFICANT CHANGE UP (ref 3.5–5.3)
RBC # BLD: 3.65 M/UL — LOW (ref 3.8–5.2)
RBC # FLD: 16.3 % — HIGH (ref 10.3–14.5)
SODIUM SERPL-SCNC: 133 MMOL/L — LOW (ref 135–145)
WBC # BLD: 4.05 K/UL — SIGNIFICANT CHANGE UP (ref 3.8–10.5)
WBC # FLD AUTO: 4.05 K/UL — SIGNIFICANT CHANGE UP (ref 3.8–10.5)

## 2020-01-17 PROCEDURE — 80048 BASIC METABOLIC PNL TOTAL CA: CPT

## 2020-01-17 PROCEDURE — 83690 ASSAY OF LIPASE: CPT

## 2020-01-17 PROCEDURE — 80053 COMPREHEN METABOLIC PANEL: CPT

## 2020-01-17 PROCEDURE — 85027 COMPLETE CBC AUTOMATED: CPT

## 2020-01-17 PROCEDURE — 36430 TRANSFUSION BLD/BLD COMPNT: CPT

## 2020-01-17 PROCEDURE — 82140 ASSAY OF AMMONIA: CPT

## 2020-01-17 PROCEDURE — 74177 CT ABD & PELVIS W/CONTRAST: CPT

## 2020-01-17 PROCEDURE — 36415 COLL VENOUS BLD VENIPUNCTURE: CPT

## 2020-01-17 PROCEDURE — T1013: CPT

## 2020-01-17 PROCEDURE — 89051 BODY FLUID CELL COUNT: CPT

## 2020-01-17 PROCEDURE — 99285 EMERGENCY DEPT VISIT HI MDM: CPT | Mod: 25

## 2020-01-17 PROCEDURE — 86900 BLOOD TYPING SEROLOGIC ABO: CPT

## 2020-01-17 PROCEDURE — 99239 HOSP IP/OBS DSCHRG MGMT >30: CPT

## 2020-01-17 PROCEDURE — 76942 ECHO GUIDE FOR BIOPSY: CPT

## 2020-01-17 PROCEDURE — 96374 THER/PROPH/DIAG INJ IV PUSH: CPT | Mod: XU

## 2020-01-17 PROCEDURE — 99261: CPT

## 2020-01-17 PROCEDURE — 86850 RBC ANTIBODY SCREEN: CPT

## 2020-01-17 PROCEDURE — P9016: CPT

## 2020-01-17 PROCEDURE — 86923 COMPATIBILITY TEST ELECTRIC: CPT

## 2020-01-17 PROCEDURE — 82962 GLUCOSE BLOOD TEST: CPT

## 2020-01-17 PROCEDURE — 86901 BLOOD TYPING SEROLOGIC RH(D): CPT

## 2020-01-17 RX ORDER — ERYTHROPOIETIN 10000 [IU]/ML
10000 INJECTION, SOLUTION INTRAVENOUS; SUBCUTANEOUS
Qty: 0 | Refills: 0 | DISCHARGE
Start: 2020-01-17

## 2020-01-17 RX ADMIN — CARVEDILOL PHOSPHATE 12.5 MILLIGRAM(S): 80 CAPSULE, EXTENDED RELEASE ORAL at 17:11

## 2020-01-17 RX ADMIN — PANTOPRAZOLE SODIUM 40 MILLIGRAM(S): 20 TABLET, DELAYED RELEASE ORAL at 06:43

## 2020-01-17 RX ADMIN — Medication 1 TABLET(S): at 06:43

## 2020-01-17 RX ADMIN — SACUBITRIL AND VALSARTAN 1 TABLET(S): 24; 26 TABLET, FILM COATED ORAL at 06:43

## 2020-01-17 RX ADMIN — Medication 2 UNIT(S): at 08:17

## 2020-01-17 RX ADMIN — Medication 81 MILLIGRAM(S): at 12:07

## 2020-01-17 RX ADMIN — Medication 2 UNIT(S): at 12:08

## 2020-01-17 RX ADMIN — Medication 1: at 12:08

## 2020-01-17 RX ADMIN — CARVEDILOL PHOSPHATE 12.5 MILLIGRAM(S): 80 CAPSULE, EXTENDED RELEASE ORAL at 06:43

## 2020-01-17 RX ADMIN — Medication 325 MILLIGRAM(S): at 12:07

## 2020-01-17 NOTE — DISCHARGE NOTE PROVIDER - NSDCCPCAREPLAN_GEN_ALL_CORE_FT
PRINCIPAL DISCHARGE DIAGNOSIS  Diagnosis: Vomiting and diarrhea  Assessment and Plan of Treatment:       SECONDARY DISCHARGE DIAGNOSES  Diagnosis: S/P abdominal paracentesis  Assessment and Plan of Treatment: 8700cc's removed, Please do not drink more than 1 lt of fluid a day and reduce your daily salt intake to 2 grams. If you exceed this limits of fluids and salt, you may run a high risk of accumulating more fluid. Please return to ED if your symptoms return.   Please follow up with gastroenterology and set up for outpatient paracentesis to prevent future hospitalizations. Also continue with off loading fluid with hemodialysis.    Diagnosis: Dialysis patient  Assessment and Plan of Treatment:

## 2020-01-17 NOTE — DISCHARGE NOTE PROVIDER - NSDCFUSCHEDAPPT_GEN_ALL_CORE_FT
JAROCHO MORALES ; 01/30/2020 ; [facility] [address] [place] [address] JAROCHO MORALES ; 01/30/2020 ; [facility] [address] [place] [address]

## 2020-01-17 NOTE — DISCHARGE NOTE PROVIDER - CARE PROVIDERS DIRECT ADDRESSES
[email or web address],DirectAddress_Unknown,DirectAddress_Unknown,DirectAddress_Unknown

## 2020-01-17 NOTE — DISCHARGE NOTE PROVIDER - CARE PROVIDER_API CALL
Elian Oliveira)  Gastroenterology; Internal Medicine  [address] [facility], [address]  [place] [address]  Phone: [phone]  Fax: [phone]  Follow Up Time: 1 week    John Aguilar (DO)  Internal Medicine  [address] [facility], [place] [address]  Phone: [phone]  Fax: [phone]  Follow Up Time:     Kemar Kidd)  Cardiovascular Disease; Internal Medicine  [address] [place] [address]  Phone: [phone]  Fax: [phone]  Follow Up Time:     Primary care physician,   Phone: (   )    -  Fax: (   )    -  Follow Up Time:

## 2020-01-17 NOTE — DISCHARGE NOTE PROVIDER - PROVIDER TOKENS
PROVIDER:[TOKEN:[[id number]],FOLLOWUP:[1 week]],PROVIDER:[TOKEN:[[id number]]],PROVIDER:[TOKEN:[[id number]]],FREE:[LAST:[Primary care physician],PHONE:[(   )    -],FAX:[(   )    -]]

## 2020-01-17 NOTE — DISCHARGE NOTE PROVIDER - INSTRUCTIONS
Follow a heart healthy diet. And make sure to limit the salt (sodium) in your diet. Max 2 grams of salt daily. Salt causes your body to hold water. This makes your heart work harder as there is more fluid for the heart to pump. Limit your salt by doing the following:  Limit canned, dried, packaged, and fast foods, don't add salt to your food, season foods with herbs instead of salt. Watch how much liquids you drink, drinking too much can make heart failure worse. Do not drink more than 1 L of fluid per day. Limit the amount of alcohol you drink. It may harm your heart, women should have no more than 1 drink a day and men should have no more than 2.  When you eat out, request that your meals have no added salt.

## 2020-01-17 NOTE — PROGRESS NOTE ADULT - SUBJECTIVE AND OBJECTIVE BOX
NEPHROLOGY INTERVAL HPI/OVERNIGHT EVENTS:    Examined on HD tolerating ok    MEDICATIONS  (STANDING):  aspirin enteric coated 81 milliGRAM(s) Oral daily  calcitriol   Capsule 0.25 MICROGram(s) Oral daily  calcium carbonate   1250 mG (OsCal) 1 Tablet(s) Oral three times a day  carvedilol 12.5 milliGRAM(s) Oral every 12 hours  dextrose 5%. 1000 milliLiter(s) (50 mL/Hr) IV Continuous <Continuous>  dextrose 50% Injectable 12.5 Gram(s) IV Push once  dextrose 50% Injectable 25 Gram(s) IV Push once  dextrose 50% Injectable 25 Gram(s) IV Push once  epoetin gian Injectable 57045 Unit(s) IV Push <User Schedule>  ferrous    sulfate 325 milliGRAM(s) Oral daily  insulin glargine Injectable (LANTUS) 4 Unit(s) SubCutaneous at bedtime  insulin lispro (HumaLOG) corrective regimen sliding scale   SubCutaneous three times a day before meals  insulin lispro (HumaLOG) corrective regimen sliding scale   SubCutaneous at bedtime  insulin lispro Injectable (HumaLOG) 2 Unit(s) SubCutaneous before breakfast  insulin lispro Injectable (HumaLOG) 2 Unit(s) SubCutaneous before lunch  Nephro-mariann 1 Tablet(s) Oral daily  pantoprazole    Tablet 40 milliGRAM(s) Oral before breakfast  sacubitril 49 mG/valsartan 51 mG 1 Tablet(s) Oral <User Schedule>    MEDICATIONS  (PRN):  dextrose 40% Gel 15 Gram(s) Oral once PRN Blood Glucose LESS THAN 70 milliGRAM(s)/deciliter  glucagon  Injectable 1 milliGRAM(s) IntraMuscular once PRN Glucose LESS THAN 70 milligrams/deciliter  ondansetron Injectable 4 milliGRAM(s) IV Push every 8 hours PRN Nausea and/or Vomiting  senna 2 Tablet(s) Oral at bedtime PRN Constipation      Allergies    eggs (Anaphylaxis)  No Known Drug Allergies    Intolerances        Vital Signs Last 24 Hrs  T(C): 37.2 (2020 15:29), Max: 37.3 (2020 19:26)  T(F): 98.9 (2020 15:29), Max: 99.2 (2020 08:06)  HR: 70 (2020 15:29) (70 - 85)  BP: 128/64 (2020 15:29) (121/53 - 133/67)  BP(mean): --  RR: 18 (2020 15:29) (16 - 19)  SpO2: 99% (2020 15:29) (95% - 99%)  Daily     Daily Weight in k.3 (2020 15:29)    PHYSICAL EXAM:  GENERAL: Appears chronically ill  HEAD:  NCAT  EYES: EOMI  NECK: veins full  NERVOUS SYSTEM:  alert, verbal  CHEST/LUNG: No 02, no wheezes, decreased bs bases  HEART: no rub  ABDOMEN: distended, ascites  EXTREMITIES:  left arm access with bruit    LABS:                        10.1   4.05  )-----------( 307      ( 2020 07:22 )             32.0     01-17    133<L>  |  95<L>  |  20.0  ----------------------------<  122<H>  4.3   |  27.0  |  4.08<H>    Ca    8.1<L>      2020 07:22                  RADIOLOGY & ADDITIONAL TESTS:

## 2020-01-17 NOTE — DISCHARGE NOTE PROVIDER - HOSPITAL COURSE
60 y/o female with PMH of ESRD on HD M/W/F, DM II, PAF not on AC due to GI bleed, CHFrEF 35-40%, nonobstructive CAD, pericarditis s/p pericardiocentesis, recurrent ascites s/p multiple paracentesis came to the ED complaining of abdominal pain with multiple episodes of vomiting and diarrhea since yesterday. Patient went for her routine HD yesterday but it was not done due to intractably vomiting. Patient noted diffuse abdominal pain and dark stool. She has no shortness of breath, chest pain, palpitation, sick contact, recent travel, fever, chills, dizziness. CT abd seen with large volume ascites. Of note, pt recently admitted (12/15-12/18) with CP, coffee-ground emesis. Had EGD with erosive esophagitis, mild gastroduodenitis. Taken for HD Tue 1/14. Transfused 1u PRBC with HD 1/16. Short session HD 1/17. Pt also s/p repeat paracentesis 1/16 with IR, 8700ccs removed. Pt feeling much better after. No additional episodes of N/V. Denies abd pain, CP, SOB.         Vital Signs Last 24 Hrs    T(C): 37.3 (17 Jan 2020 08:06), Max: 37.3 (16 Jan 2020 19:26)    T(F): 99.2 (17 Jan 2020 08:06), Max: 99.2 (17 Jan 2020 08:06)    HR: 74 (17 Jan 2020 08:06) (69 - 85)    BP: 121/53 (17 Jan 2020 08:06) (121/53 - 133/76)    BP(mean): --    RR: 18 (17 Jan 2020 08:06) (16 - 19)    SpO2: 95% (17 Jan 2020 08:06) (95% - 97%)        GENERAL: NAD    HEENT: PERRL, +EOMI    NECK: soft, supple    CHEST/LUNG: Clear to auscultation bilaterally; No wheezing, non-labored respirations    HEART: S1S2+, Regular rate and rhythm; No murmurs, rubs, or gallops    ABDOMEN: Soft, non-tender; decreased distention     SKIN: warm, dry    NEURO: alert and oriented, non-focal        34 minutes spent on discharge

## 2020-01-17 NOTE — PROGRESS NOTE ADULT - ASSESSMENT
ESRD HD today on MWF schedule  short treatment today to put back on schedule  Has h/o DM 2, Anemia, Cardiomyopathy with acsites.  s/p paracentesis    Will follow

## 2020-01-17 NOTE — DISCHARGE NOTE NURSING/CASE MANAGEMENT/SOCIAL WORK - PATIENT PORTAL LINK FT
You can access the FollowMyHealth Patient Portal offered by [facility] by registering at the following website: [email or web address]. By joining [employer]’s FollowMyHealth portal, you will also be able to view your health information using other applications (apps) compatible with our system.

## 2020-01-17 NOTE — DISCHARGE NOTE PROVIDER - NSDCMRMEDTOKEN_GEN_ALL_CORE_FT
aspirin 81 mg oral delayed release tablet: 1 tab(s) orally once a day  calcitriol 0.25 mcg oral capsule: 1 cap(s) orally once a day  calcium carbonate 1250 mg (500 mg elemental calcium) oral tablet: 1 tab(s) orally 3 times a day  Colace 100 mg oral capsule: 1 cap(s) orally 2 times a day, As Needed -for constipation   Coreg 25 mg oral tablet: 0.5 tab(s) orally 2 times a day  Entresto 49 mg-51 mg oral tablet: 1 tab(s) orally once a day  epoetin gian: 47991 unit(s) intravenous with HD as per nephrology  FeroSul 325 mg (65 mg elemental iron) oral tablet: 1 tab(s) orally once a day   HumaLOG 100 units/mL subcutaneous solution: 2 unit(s) subcutaneous 2 times a day (with meals)    per scale   insulin glargine 100 units/mL subcutaneous solution: 4 unit(s) subcutaneous once a day (at bedtime)    per fingerstick and as per home dose   multivitamin: 1 tab(s) orally once a day  pantoprazole 40 mg oral delayed release tablet: 1 tab(s) orally 2 times a day   senna oral tablet: 2 tab(s) orally once a day (at bedtime), As needed, Constipation aspirin 81 mg oral delayed release tablet: 1 tab(s) orally once a day  calcitriol 0.25 mcg oral capsule: 1 cap(s) orally once a day  calcium carbonate 1250 mg (500 mg elemental calcium) oral tablet: 1 tab(s) orally 3 times a day  Colace 100 mg oral capsule: 1 cap(s) orally 2 times a day, As Needed -for constipation   Coreg 25 mg oral tablet: 0.5 tab(s) orally 2 times a day  Entresto 49 mg-51 mg oral tablet: 1 tab(s) orally once a day  epoetin gian: 43863 unit(s) intravenous with HD as per nephrology  FeroSul 325 mg (65 mg elemental iron) oral tablet: 1 tab(s) orally once a day   HumaLOG 100 units/mL subcutaneous solution: 2 unit(s) subcutaneous 2 times a day (with meals)    per scale   insulin glargine 100 units/mL subcutaneous solution: 4 unit(s) subcutaneous once a day (at bedtime)    per fingerstick and as per home dose   multivitamin: 1 tab(s) orally once a day  pantoprazole 40 mg oral delayed release tablet: 1 tab(s) orally 2 times a day   senna oral tablet: 2 tab(s) orally once a day (at bedtime), As needed, Constipation

## 2020-01-30 ENCOUNTER — APPOINTMENT (OUTPATIENT)
Dept: PULMONOLOGY | Facility: CLINIC | Age: 60
End: 2020-01-30
Payer: MEDICARE

## 2020-01-30 VITALS
BODY MASS INDEX: 26.43 KG/M2 | SYSTOLIC BLOOD PRESSURE: 126 MMHG | DIASTOLIC BLOOD PRESSURE: 64 MMHG | WEIGHT: 140 LBS | OXYGEN SATURATION: 93 % | HEIGHT: 61 IN | HEART RATE: 79 BPM

## 2020-01-30 VITALS — RESPIRATION RATE: 16 BRPM

## 2020-01-30 DIAGNOSIS — J90 PLEURAL EFFUSION, NOT ELSEWHERE CLASSIFIED: ICD-10-CM

## 2020-01-30 PROCEDURE — 99214 OFFICE O/P EST MOD 30 MIN: CPT

## 2020-01-30 RX ORDER — CALCITRIOL 0.5 UG/1
CAPSULE, LIQUID FILLED ORAL
Refills: 0 | Status: DISCONTINUED | COMMUNITY
End: 2020-01-30

## 2020-01-30 RX ORDER — CHLORHEXIDINE GLUCONATE 4 %
325 (65 FE) LIQUID (ML) TOPICAL DAILY
Refills: 0 | Status: DISCONTINUED | COMMUNITY
End: 2020-01-30

## 2020-01-30 NOTE — HISTORY OF PRESENT ILLNESS
[TextBox_4] : Patient has been hospitalized multiple times with ascites. She has pericarditis and was recently in the hospital for drainage of ascites. She had 8700 cc drained recently. The patient has oxygen at home. There is some dyspnea on exertion at times.

## 2020-01-30 NOTE — REASON FOR VISIT
[Follow-Up - From Hospitalization] : a follow-up visit after a recent hospitalization [Abnormal CXR/ Chest CT] : an abnormal CXR/ chest CT [Formal Caregiver] : formal caregiver [[employer] ] : provided by [employer]   [FreeTextEntry1] : [id number] [FreeTextEntry2] : Bee [TWNoteComboBox1] : [place]

## 2020-01-30 NOTE — ASSESSMENT
[FreeTextEntry1] : Patient with recurrent ascites from cardiomyopathy and renal failure. She was advised that she should be using oxygen when her abdominal distention gets to a significant size. Otherwise after drainage she doesn't need it. There is little to add from a pulmonary point of view.

## 2020-02-04 ENCOUNTER — FORM ENCOUNTER (OUTPATIENT)
Age: 60
End: 2020-02-04

## 2020-02-05 ENCOUNTER — OUTPATIENT (OUTPATIENT)
Dept: OUTPATIENT SERVICES | Facility: HOSPITAL | Age: 60
LOS: 1 days | End: 2020-02-05

## 2020-02-05 ENCOUNTER — APPOINTMENT (OUTPATIENT)
Dept: ULTRASOUND IMAGING | Facility: CLINIC | Age: 60
End: 2020-02-05
Payer: MEDICARE

## 2020-02-05 DIAGNOSIS — I77.0 ARTERIOVENOUS FISTULA, ACQUIRED: Chronic | ICD-10-CM

## 2020-02-05 DIAGNOSIS — Z49.01 ENCOUNTER FOR FITTING AND ADJUSTMENT OF EXTRACORPOREAL DIALYSIS CATHETER: Chronic | ICD-10-CM

## 2020-02-05 DIAGNOSIS — R18.8 OTHER ASCITES: ICD-10-CM

## 2020-02-05 LAB
CULTURE RESULTS: SIGNIFICANT CHANGE UP
SPECIMEN SOURCE: SIGNIFICANT CHANGE UP

## 2020-02-05 PROCEDURE — 76700 US EXAM ABDOM COMPLETE: CPT | Mod: 26

## 2020-05-05 ENCOUNTER — INPATIENT (INPATIENT)
Facility: HOSPITAL | Age: 60
LOS: 3 days | Discharge: HOME | DRG: 291 | End: 2020-05-09
Attending: HOSPITALIST | Admitting: INTERNAL MEDICINE
Payer: MEDICARE

## 2020-05-05 VITALS
DIASTOLIC BLOOD PRESSURE: 106 MMHG | TEMPERATURE: 98 F | HEART RATE: 93 BPM | SYSTOLIC BLOOD PRESSURE: 186 MMHG | WEIGHT: 149.91 LBS | RESPIRATION RATE: 20 BRPM | HEIGHT: 60 IN | OXYGEN SATURATION: 97 %

## 2020-05-05 DIAGNOSIS — I77.0 ARTERIOVENOUS FISTULA, ACQUIRED: Chronic | ICD-10-CM

## 2020-05-05 DIAGNOSIS — Z49.01 ENCOUNTER FOR FITTING AND ADJUSTMENT OF EXTRACORPOREAL DIALYSIS CATHETER: Chronic | ICD-10-CM

## 2020-05-05 DIAGNOSIS — R18.8 OTHER ASCITES: ICD-10-CM

## 2020-05-05 PROBLEM — E11.9 TYPE 2 DIABETES MELLITUS WITHOUT COMPLICATIONS: Chronic | Status: ACTIVE | Noted: 2019-12-26

## 2020-05-05 PROBLEM — N18.6 END STAGE RENAL DISEASE: Chronic | Status: ACTIVE | Noted: 2019-12-26

## 2020-05-05 LAB
ALBUMIN SERPL ELPH-MCNC: 3.6 G/DL — SIGNIFICANT CHANGE UP (ref 3.3–5.2)
ALP SERPL-CCNC: 218 U/L — HIGH (ref 40–120)
ALT FLD-CCNC: 7 U/L — SIGNIFICANT CHANGE UP
AMMONIA BLD-MCNC: 20 UMOL/L — SIGNIFICANT CHANGE UP (ref 11–55)
ANION GAP SERPL CALC-SCNC: 13 MMOL/L — SIGNIFICANT CHANGE UP (ref 5–17)
APTT BLD: 33 SEC — SIGNIFICANT CHANGE UP (ref 27.5–36.3)
AST SERPL-CCNC: 18 U/L — SIGNIFICANT CHANGE UP
BASOPHILS # BLD AUTO: 0.02 K/UL — SIGNIFICANT CHANGE UP (ref 0–0.2)
BASOPHILS NFR BLD AUTO: 0.3 % — SIGNIFICANT CHANGE UP (ref 0–2)
BILIRUB SERPL-MCNC: 0.7 MG/DL — SIGNIFICANT CHANGE UP (ref 0.4–2)
BUN SERPL-MCNC: 27 MG/DL — HIGH (ref 8–20)
CALCIUM SERPL-MCNC: 9.8 MG/DL — SIGNIFICANT CHANGE UP (ref 8.6–10.2)
CHLORIDE SERPL-SCNC: 93 MMOL/L — LOW (ref 98–107)
CK SERPL-CCNC: 99 U/L — SIGNIFICANT CHANGE UP (ref 25–170)
CO2 SERPL-SCNC: 36 MMOL/L — HIGH (ref 22–29)
CREAT SERPL-MCNC: 4.28 MG/DL — HIGH (ref 0.5–1.3)
CRP SERPL-MCNC: 4.04 MG/DL — HIGH (ref 0–0.4)
D DIMER BLD IA.RAPID-MCNC: 571 NG/ML DDU — HIGH
EOSINOPHIL # BLD AUTO: 0.07 K/UL — SIGNIFICANT CHANGE UP (ref 0–0.5)
EOSINOPHIL NFR BLD AUTO: 1.1 % — SIGNIFICANT CHANGE UP (ref 0–6)
FERRITIN SERPL-MCNC: 1887 NG/ML — HIGH (ref 15–150)
FIBRINOGEN PPP-MCNC: 838 MG/DL — CRITICAL HIGH (ref 300–520)
GAS PNL BLDV: SIGNIFICANT CHANGE UP
GLUCOSE BLDC GLUCOMTR-MCNC: 147 MG/DL — HIGH (ref 70–99)
GLUCOSE SERPL-MCNC: 173 MG/DL — HIGH (ref 70–99)
HCO3 BLDV-SCNC: 38 MMOL/L — HIGH (ref 21–29)
HCT VFR BLD CALC: 39.4 % — SIGNIFICANT CHANGE UP (ref 34.5–45)
HGB BLD-MCNC: 12.5 G/DL — SIGNIFICANT CHANGE UP (ref 11.5–15.5)
IMM GRANULOCYTES NFR BLD AUTO: 0.2 % — SIGNIFICANT CHANGE UP (ref 0–1.5)
INR BLD: 0.96 RATIO — SIGNIFICANT CHANGE UP (ref 0.88–1.16)
LACTATE BLDV-MCNC: 1.4 MMOL/L — SIGNIFICANT CHANGE UP (ref 0.5–2)
LDH SERPL L TO P-CCNC: 201 U/L — HIGH (ref 98–192)
LYMPHOCYTES # BLD AUTO: 0.44 K/UL — LOW (ref 1–3.3)
LYMPHOCYTES # BLD AUTO: 6.7 % — LOW (ref 13–44)
MCHC RBC-ENTMCNC: 29.9 PG — SIGNIFICANT CHANGE UP (ref 27–34)
MCHC RBC-ENTMCNC: 31.7 GM/DL — LOW (ref 32–36)
MCV RBC AUTO: 94.3 FL — SIGNIFICANT CHANGE UP (ref 80–100)
MONOCYTES # BLD AUTO: 0.43 K/UL — SIGNIFICANT CHANGE UP (ref 0–0.9)
MONOCYTES NFR BLD AUTO: 6.5 % — SIGNIFICANT CHANGE UP (ref 2–14)
NEUTROPHILS # BLD AUTO: 5.63 K/UL — SIGNIFICANT CHANGE UP (ref 1.8–7.4)
NEUTROPHILS NFR BLD AUTO: 85.2 % — HIGH (ref 43–77)
NT-PROBNP SERPL-SCNC: HIGH PG/ML (ref 0–300)
PCO2 BLDV: 54 MMHG — HIGH (ref 35–50)
PH BLDV: 7.49 — HIGH (ref 7.32–7.43)
PLATELET # BLD AUTO: 271 K/UL — SIGNIFICANT CHANGE UP (ref 150–400)
PO2 BLDV: 40 MMHG — SIGNIFICANT CHANGE UP (ref 25–45)
POTASSIUM SERPL-MCNC: 5.1 MMOL/L — SIGNIFICANT CHANGE UP (ref 3.5–5.3)
POTASSIUM SERPL-SCNC: 5.1 MMOL/L — SIGNIFICANT CHANGE UP (ref 3.5–5.3)
PROCALCITONIN SERPL-MCNC: 0.21 NG/ML — HIGH (ref 0.02–0.1)
PROT SERPL-MCNC: 7.8 G/DL — SIGNIFICANT CHANGE UP (ref 6.6–8.7)
PROTHROM AB SERPL-ACNC: 10.8 SEC — SIGNIFICANT CHANGE UP (ref 10–12.9)
RBC # BLD: 4.18 M/UL — SIGNIFICANT CHANGE UP (ref 3.8–5.2)
RBC # FLD: 14.6 % — HIGH (ref 10.3–14.5)
SAO2 % BLDV: 77 % — SIGNIFICANT CHANGE UP
SODIUM SERPL-SCNC: 142 MMOL/L — SIGNIFICANT CHANGE UP (ref 135–145)
TROPONIN T SERPL-MCNC: 0.46 NG/ML — HIGH (ref 0–0.06)
TROPONIN T SERPL-MCNC: 0.48 NG/ML — HIGH (ref 0–0.06)
TROPONIN T SERPL-MCNC: 0.51 NG/ML — HIGH (ref 0–0.06)
WBC # BLD: 6.6 K/UL — SIGNIFICANT CHANGE UP (ref 3.8–10.5)
WBC # FLD AUTO: 6.6 K/UL — SIGNIFICANT CHANGE UP (ref 3.8–10.5)

## 2020-05-05 PROCEDURE — 49083 ABD PARACENTESIS W/IMAGING: CPT

## 2020-05-05 PROCEDURE — 93010 ELECTROCARDIOGRAM REPORT: CPT

## 2020-05-05 PROCEDURE — 99285 EMERGENCY DEPT VISIT HI MDM: CPT | Mod: CS,GC

## 2020-05-05 PROCEDURE — 99223 1ST HOSP IP/OBS HIGH 75: CPT

## 2020-05-05 PROCEDURE — 71045 X-RAY EXAM CHEST 1 VIEW: CPT | Mod: 26

## 2020-05-05 PROCEDURE — 76705 ECHO EXAM OF ABDOMEN: CPT | Mod: 26

## 2020-05-05 RX ORDER — FAMOTIDINE 10 MG/ML
20 INJECTION INTRAVENOUS ONCE
Refills: 0 | Status: COMPLETED | OUTPATIENT
Start: 2020-05-05 | End: 2020-05-05

## 2020-05-05 RX ORDER — DEXTROSE 50 % IN WATER 50 %
25 SYRINGE (ML) INTRAVENOUS ONCE
Refills: 0 | Status: DISCONTINUED | OUTPATIENT
Start: 2020-05-05 | End: 2020-05-09

## 2020-05-05 RX ORDER — DEXTROSE 50 % IN WATER 50 %
15 SYRINGE (ML) INTRAVENOUS ONCE
Refills: 0 | Status: DISCONTINUED | OUTPATIENT
Start: 2020-05-05 | End: 2020-05-09

## 2020-05-05 RX ORDER — DEXTROSE 50 % IN WATER 50 %
12.5 SYRINGE (ML) INTRAVENOUS ONCE
Refills: 0 | Status: DISCONTINUED | OUTPATIENT
Start: 2020-05-05 | End: 2020-05-09

## 2020-05-05 RX ORDER — ACETAMINOPHEN 500 MG
975 TABLET ORAL EVERY 6 HOURS
Refills: 0 | Status: DISCONTINUED | OUTPATIENT
Start: 2020-05-05 | End: 2020-05-09

## 2020-05-05 RX ORDER — FERROUS SULFATE 325(65) MG
325 TABLET ORAL DAILY
Refills: 0 | Status: DISCONTINUED | OUTPATIENT
Start: 2020-05-05 | End: 2020-05-09

## 2020-05-05 RX ORDER — CARVEDILOL PHOSPHATE 80 MG/1
12.5 CAPSULE, EXTENDED RELEASE ORAL EVERY 12 HOURS
Refills: 0 | Status: DISCONTINUED | OUTPATIENT
Start: 2020-05-05 | End: 2020-05-06

## 2020-05-05 RX ORDER — GLUCAGON INJECTION, SOLUTION 0.5 MG/.1ML
1 INJECTION, SOLUTION SUBCUTANEOUS ONCE
Refills: 0 | Status: DISCONTINUED | OUTPATIENT
Start: 2020-05-05 | End: 2020-05-09

## 2020-05-05 RX ORDER — SACUBITRIL AND VALSARTAN 24; 26 MG/1; MG/1
1 TABLET, FILM COATED ORAL
Refills: 0 | Status: DISCONTINUED | OUTPATIENT
Start: 2020-05-05 | End: 2020-05-09

## 2020-05-05 RX ORDER — ONDANSETRON 8 MG/1
4 TABLET, FILM COATED ORAL ONCE
Refills: 0 | Status: COMPLETED | OUTPATIENT
Start: 2020-05-05 | End: 2020-05-05

## 2020-05-05 RX ORDER — TRAMADOL HYDROCHLORIDE 50 MG/1
75 TABLET ORAL EVERY 6 HOURS
Refills: 0 | Status: DISCONTINUED | OUTPATIENT
Start: 2020-05-05 | End: 2020-05-09

## 2020-05-05 RX ORDER — SODIUM CHLORIDE 9 MG/ML
1000 INJECTION, SOLUTION INTRAVENOUS
Refills: 0 | Status: DISCONTINUED | OUTPATIENT
Start: 2020-05-05 | End: 2020-05-09

## 2020-05-05 RX ORDER — SENNA PLUS 8.6 MG/1
2 TABLET ORAL AT BEDTIME
Refills: 0 | Status: DISCONTINUED | OUTPATIENT
Start: 2020-05-05 | End: 2020-05-09

## 2020-05-05 RX ORDER — BENZOCAINE AND MENTHOL 5; 1 G/100ML; G/100ML
1 LIQUID ORAL EVERY 4 HOURS
Refills: 0 | Status: DISCONTINUED | OUTPATIENT
Start: 2020-05-05 | End: 2020-05-09

## 2020-05-05 RX ORDER — CALCITRIOL 0.5 UG/1
0.25 CAPSULE ORAL DAILY
Refills: 0 | Status: DISCONTINUED | OUTPATIENT
Start: 2020-05-05 | End: 2020-05-09

## 2020-05-05 RX ORDER — CALCIUM CARBONATE 500(1250)
1 TABLET ORAL THREE TIMES A DAY
Refills: 0 | Status: DISCONTINUED | OUTPATIENT
Start: 2020-05-05 | End: 2020-05-09

## 2020-05-05 RX ORDER — ASPIRIN/CALCIUM CARB/MAGNESIUM 324 MG
81 TABLET ORAL DAILY
Refills: 0 | Status: DISCONTINUED | OUTPATIENT
Start: 2020-05-05 | End: 2020-05-09

## 2020-05-05 RX ORDER — METOCLOPRAMIDE HCL 10 MG
5 TABLET ORAL EVERY 6 HOURS
Refills: 0 | Status: DISCONTINUED | OUTPATIENT
Start: 2020-05-05 | End: 2020-05-09

## 2020-05-05 RX ORDER — INSULIN LISPRO 100/ML
VIAL (ML) SUBCUTANEOUS
Refills: 0 | Status: DISCONTINUED | OUTPATIENT
Start: 2020-05-05 | End: 2020-05-09

## 2020-05-05 RX ORDER — ACETAMINOPHEN 500 MG
650 TABLET ORAL EVERY 4 HOURS
Refills: 0 | Status: DISCONTINUED | OUTPATIENT
Start: 2020-05-05 | End: 2020-05-09

## 2020-05-05 RX ORDER — PANTOPRAZOLE SODIUM 20 MG/1
40 TABLET, DELAYED RELEASE ORAL
Refills: 0 | Status: DISCONTINUED | OUTPATIENT
Start: 2020-05-05 | End: 2020-05-09

## 2020-05-05 RX ORDER — ACETAMINOPHEN 500 MG
1000 TABLET ORAL EVERY 6 HOURS
Refills: 0 | Status: DISCONTINUED | OUTPATIENT
Start: 2020-05-05 | End: 2020-05-05

## 2020-05-05 RX ADMIN — ONDANSETRON 4 MILLIGRAM(S): 8 TABLET, FILM COATED ORAL at 14:00

## 2020-05-05 RX ADMIN — FAMOTIDINE 20 MILLIGRAM(S): 10 INJECTION INTRAVENOUS at 14:00

## 2020-05-05 RX ADMIN — CARVEDILOL PHOSPHATE 12.5 MILLIGRAM(S): 80 CAPSULE, EXTENDED RELEASE ORAL at 21:33

## 2020-05-05 RX ADMIN — Medication 1 TABLET(S): at 21:34

## 2020-05-05 RX ADMIN — Medication 650 MILLIGRAM(S): at 21:49

## 2020-05-05 RX ADMIN — Medication 5 MILLIGRAM(S): at 21:45

## 2020-05-05 NOTE — ED ADULT TRIAGE NOTE - CHIEF COMPLAINT QUOTE
pt c/o increasing shortness of breath since last night with abdominal pain and vomiting. pt has h/o ascites in abdomin since January. pt states she has not "had her stomach tapped because she is afraid of getting the disease."

## 2020-05-05 NOTE — H&P ADULT - NSHPPHYSICALEXAM_GEN_ALL_CORE
T(C): 36.9 (05-05-20 @ 12:37), Max: 36.9 (05-05-20 @ 12:37)  HR: 90 (05-05-20 @ 14:40) (90 - 93)  BP: 182/99 (05-05-20 @ 14:40) (182/99 - 186/106)  RR: 20 (05-05-20 @ 14:40) (20 - 20)  SpO2: 97% (05-05-20 @ 14:40) (97% - 97%)    GEN - appears age appropriate. frail. mild distress.   HEENT - NCAT, EOMI, RENE  RESP - CTA BL, no wheeze/stridor/rhonchi/crackles. not on supplemental O2.  CARDIO - NS1S2, RRR. No murmurs/rubs/gallops.  ABD - Soft/moderately tender/diffusely distended. Normal BS x4 quadrants.   Ext - No MARQUIS. no signs of venous/arterial stasis ulcers  MSK - full ROM of BL upper and lower extremities without pain or restriction. BL 4/5 strength on upper and lower extremities.   Neuro - cn 2-12 grossly intact. no visible seizure activity appreciated. no tremor. gait not observed.   Skin - clean, dry, intact. no rashes or lesions.    Psych- AAOx3. no suicidal/homicidal ideation. appropriate behaviour. attentive. normal affect.

## 2020-05-05 NOTE — CONSULT NOTE ADULT - SUBJECTIVE AND OBJECTIVE BOX
Patient is a 60y old  Female who presents with a chief complaint of       PAST MEDICAL & SURGICAL HISTORY:  End stage renal disease  Diabetes  Coronary artery disease, angina presence unspecified, unspecified vessel or lesion type, unspecified whether native or transplanted heart  Paroxysmal atrial fibrillation  Anemia due to chronic kidney disease, unspecified CKD stage  Chronic systolic congestive heart failure  Pleural effusion  Pericardial effusion  End stage renal disease on dialysis  HLD (hyperlipidemia)  HTN (hypertension)  DM (diabetes mellitus)  A-V fistula  A-V fistula  Encounter for dialysis catheter care      Summary of admission HPI:                PREVIOUS DIAGNOSTIC TESTING:      ECHO  FINDINGS:    STRESS  FINDINGS:    CATHETERIZATION  FINDINGS:    ELECTROPHYSIOLOGY STUDY  FINDINGS:    CAROTID ULTRASOUND:  FINDINGS    VENOUS DUPLEX SCAN:  FINDINGS:    CHEST CT PULMONARY ANGIO with IV Contrast:  FINDINGS:  MEDICATIONS  (STANDING):    MEDICATIONS  (PRN):      FAMILY HISTORY:  No pertinent family history in first degree relatives  Family history of kidney disease in brother (Sibling)      SOCIAL HISTORY:    CIGARETTES:    ALCOHOL:    REVIEW OF SYSTEMS:    CONSTITUTIONAL: No fever, weight loss, chills, shakes, or fatigue  EYES: No eye pain, visual disturbances, or discharge  ENMT:  No difficulty hearing, tinnitus, vertigo; No sinus or throat pain  NECK: No pain or stiffness  BREASTS: No pain, masses, or nipple discharge  RESPIRATORY: No cough, wheezing, hemoptysis, or shortness of breath  CARDIOVASCULAR: No chest pain, dyspnea, palpitations, dizziness, syncope, paroxysmal nocturnal dyspnea, orthopnea, or arm or leg swelling  GASTROINTESTINAL: No abdominal  or epigastric pain, nausea, vomiting, hematemesis, diarrhea, constipation, melena or bright red blood.  GENITOURINARY: No dysuria, nocturia, hematuria, or urinary incontinence  NEUROLOGICAL: No headaches, memory loss, slurred speech, limb weakness, loss of strength, numbness, or tremors  SKIN: No itching, burning, rashes, or lesions   LYMPH NODES: No enlarged glands  ENDOCRINE: No heat or cold intolerance, or hair loss  MUSCULOSKELETAL: No joint pain or swelling, muscle, back, or extremity pain  PSYCHIATRIC: No depression, anxiety, or difficulty sleeping  HEME/LYMPH: No easy bruising or bleeding gums  ALLERY AND IMMUNOLOGIC: No hives or rash.      Vital Signs Last 24 Hrs  T(C): 36.9 (05 May 2020 12:37), Max: 36.9 (05 May 2020 12:37)  T(F): 98.4 (05 May 2020 12:37), Max: 98.4 (05 May 2020 12:37)  HR: 93 (05 May 2020 12:37) (93 - 93)  BP: 186/106 (05 May 2020 12:37) (186/106 - 186/106)  BP(mean): --  RR: 20 (05 May 2020 12:37) (20 - 20)  SpO2: 97% (05 May 2020 12:37) (97% - 97%)    PHYSICAL EXAM:    GENERAL: In no apparent distress, well nourished, and hydrated.  HEAD:  Atraumatic, Normocephalic  EYES: EOMI, PERRLA, conjunctiva and sclera clear  ENMT: No tonsillar erythema, exudates, or enlargement; Moist mucous membranes, Good dentition, No lesions  NECK: Supple and normal thyroid.  No JVD or carotid bruit.  Carotid pulse is 2+ bilaterally.  HEART: Regular rate and rhythm; No murmurs, rubs, or gallops.  PULMONARY: Clear to auscultation and perfusion.  No rales, wheezing, or rhonchi bilaterally.  ABDOMEN: Soft, Nontender, Nondistended; Bowel sounds present  EXTREMITIES:  2+ Peripheral Pulses, No clubbing, cyanosis, or edema  LYMPH: No lymphadenopathy noted  NEUROLOGICAL: Grossly nonfocal          INTERPRETATION OF TELEMETRY:    ECG:    I&O's Detail      LABS:                        12.5   6.60  )-----------( 271      ( 05 May 2020 14:41 )             39.4     05-05    142  |  93<L>  |  27.0<H>  ----------------------------<  173<H>  5.1   |  36.0<H>  |  4.28<H>    Ca    9.8      05 May 2020 14:41    TPro  7.8  /  Alb  3.6  /  TBili  0.7  /  DBili  x   /  AST  18  /  ALT  7   /  AlkPhos  218<H>  05-05    CARDIAC MARKERS ( 05 May 2020 14:41 )  x     / 0.51 ng/mL / 99 U/L / x     / x          PT/INR - ( 05 May 2020 14:41 )   PT: 10.8 sec;   INR: 0.96 ratio         PTT - ( 05 May 2020 14:41 )  PTT:33.0 sec    BNPSerum Pro-Brain Natriuretic Peptide: >62255 pg/mL (05-05 @ 14:41)    I&O's Detail    Daily Height in cm: 152.4 (05 May 2020 12:37)    Daily     RADIOLOGY & ADDITIONAL STUDIES: Patient is a 60y old  Female who presents with a chief complaint of       PAST MEDICAL & SURGICAL HISTORY:  End stage renal disease  Diabetes  Coronary artery disease, angina presence unspecified, unspecified vessel or lesion type, unspecified whether native or transplanted heart  Paroxysmal atrial fibrillation  Anemia due to chronic kidney disease, unspecified CKD stage  Chronic systolic congestive heart failure  Pleural effusion  Pericardial effusion  End stage renal disease on dialysis  HLD (hyperlipidemia)  HTN (hypertension)  DM (diabetes mellitus)  A-V fistula  A-V fistula  Encounter for dialysis catheter care      Summary of admission HPI:    58 yo F w/ h/o ESRD (M/W/F), h/o pericardial effusion, normal coronaries on [facility] 03/23/18, Lambl's excrescence, PAF, DM, HTN          FAMILY HISTORY:  No pertinent family history in first degree relatives  Family history of kidney disease in brother (Sibling)    REVIEW OF SYSTEMS:    CONSTITUTIONAL: No fever, weight loss, chills, shakes, or fatigue  EYES: No eye pain, visual disturbances, or discharge  ENMT:  No difficulty hearing, tinnitus, vertigo; No sinus or throat pain  NECK: No pain or stiffness  BREASTS: No pain, masses, or nipple discharge  RESPIRATORY: No cough, wheezing, hemoptysis, or shortness of breath  CARDIOVASCULAR: No chest pain, dyspnea, palpitations, dizziness, syncope, paroxysmal nocturnal dyspnea, orthopnea, or arm or leg swelling  GASTROINTESTINAL: No abdominal  or epigastric pain, nausea, vomiting, hematemesis, diarrhea, constipation, melena or bright red blood.  GENITOURINARY: No dysuria, nocturia, hematuria, or urinary incontinence  NEUROLOGICAL: No headaches, memory loss, slurred speech, limb weakness, loss of strength, numbness, or tremors  SKIN: No itching, burning, rashes, or lesions   LYMPH NODES: No enlarged glands  ENDOCRINE: No heat or cold intolerance, or hair loss  MUSCULOSKELETAL: No joint pain or swelling, muscle, back, or extremity pain  PSYCHIATRIC: No depression, anxiety, or difficulty sleeping  HEME/LYMPH: No easy bruising or bleeding gums  ALLERY AND IMMUNOLOGIC: No hives or rash.      Vital Signs Last 24 Hrs  T(C): 36.9 (05 May 2020 12:37), Max: 36.9 (05 May 2020 12:37)  T(F): 98.4 (05 May 2020 12:37), Max: 98.4 (05 May 2020 12:37)  HR: 93 (05 May 2020 12:37) (93 - 93)  BP: 186/106 (05 May 2020 12:37) (186/106 - 186/106)  BP(mean): --  RR: 20 (05 May 2020 12:37) (20 - 20)  SpO2: 97% (05 May 2020 12:37) (97% - 97%)    PHYSICAL EXAM:    *PE deferred to reduce infectious disease     ECG: SR + LVH + limb leads switched     I&O's Detail      LABS:                        12.5   6.60  )-----------( 271      ( 05 May 2020 14:41 )             39.4     05-05    142  |  93<L>  |  27.0<H>  ----------------------------<  173<H>  5.1   |  36.0<H>  |  4.28<H>    Ca    9.8      05 May 2020 14:41    TPro  7.8  /  Alb  3.6  /  TBili  0.7  /  DBili  x   /  AST  18  /  ALT  7   /  AlkPhos  218<H>  05-05    CARDIAC MARKERS ( 05 May 2020 14:41 )  x     / 0.51 ng/mL / 99 U/L / x     / x          PT/INR - ( 05 May 2020 14:41 )   PT: 10.8 sec;   INR: 0.96 ratio         PTT - ( 05 May 2020 14:41 )  PTT:33.0 sec    BNPSerum Pro-Brain Natriuretic Peptide: >53018 pg/mL (05-05 @ 14:41)    I&O's Detail    Daily Height in cm: 152.4 (05 May 2020 12:37)    Daily     RADIOLOGY & ADDITIONAL STUDIES:

## 2020-05-05 NOTE — H&P ADULT - NSICDXPASTMEDICALHX_GEN_ALL_CORE_FT
PAST MEDICAL HISTORY:  Anemia due to chronic kidney disease, unspecified CKD stage     Chronic systolic congestive heart failure     Coronary artery disease, angina presence unspecified, unspecified vessel or lesion type, unspecified whether native or transplanted heart     Diabetes     DM (diabetes mellitus)     End stage renal disease     End stage renal disease on dialysis     HLD (hyperlipidemia)     HTN (hypertension)     Paroxysmal atrial fibrillation     Pericardial effusion     Pleural effusion

## 2020-05-05 NOTE — ED PROVIDER NOTE - NS ED ROS FT
Constitutional: no fever, sweats, and no chills.  Eyes: no pain, no redness, and no visual changes.  ENMT: no ear pain and no hearing problems, no nasal congestion/drainage, no dysphagia, and no throat pain, no neck pain, no stiffness  CV: no chest pain, no edema.  Resp: +dyspnea  GI: +diffuse abdominal pain, no bloating, no constipation, no diarrhea, no nausea and no vomiting.  : no dysuria, no hematuria  MSK: no msk pain, no weakness  Skin: no lesions, and no rashes.  Neuro: no LOC, no headache, no sensory deficits, and no weakness.

## 2020-05-05 NOTE — H&P ADULT - ASSESSMENT
60yoF w/ pmh ESRD on HD M/W/F, DM II, Parox AF not on AC due to GI bleed, hx chronic combined HF (ef 35-40% w/ [id number]) resolved on repeat echo, nonobstructive CAD, hx pericarditis s/p pericardiocentesis, recurrent ascites s/p multiple paracentesis presenting w/ recurrent abd pain, admitted for acute on chronic ascites.    #acute on chronic ascites - hx mult paracentesis in past, last done 01/2020. no hx cirrhosis. prior hx chronic combined HF (ef 35-40% w/ [id number]) resolved on repeat echo 12/2019. suspect ascites as function of volume overload from esrd. therapeutic paracentesis in am, likely to be high volume so likely to need alb transfusion afterwards. spoke w/ radiology, IR attending gone for the day but order placed, they will leave info for attending to review in am, will need to confirm that procedure is scheduled.    #elevated troponin, asymptomatic - patient w/ chronically elevated trop usually between 0.5-0.9 @ baseline given esrd. chronic CP unchanged per patient. ekg w/ lateral twave inversions, chronic, no acute ischemic changes. repeat trop pending collection. [facility] eval called by ER, pending.     #esrd on HD (MWF) - stable. Dr Aguilar group eval pending, cs called by Dr Keith    #dm2 on long term insulin therapy complicated by asymptomatic hyperglycemia, stable. - a1c in am, last checked 12/2019 was 7.5. iss + fs achs. hold home lantus given poor appetite in setting of ascites.     dvt ppx. scd. no pharm ppx in anticipated of paracentesis    dispo. pending improvement in ascites.    outpt fu. pmd. nephro. cardio.

## 2020-05-05 NOTE — H&P ADULT - NSICDXFAMILYHX_GEN_ALL_CORE_FT
FAMILY HISTORY:  No pertinent family history in first degree relatives    Sibling  Still living? Unknown  Family history of kidney disease in brother, Age at diagnosis: Age Unknown

## 2020-05-05 NOTE — ED PROVIDER NOTE - CLINICAL SUMMARY MEDICAL DECISION MAKING FREE TEXT BOX
Pt is a 60 y.o. F presenting with progressive ascites, now dyspnea. Labs, imaging, ekg, meds. Will require admission for paracentesis due to fluid overload and uremia. Will contact [facility] and [facility].

## 2020-05-05 NOTE — H&P ADULT - HISTORY OF PRESENT ILLNESS
60yoF w/ pmh ESRD on HD M/W/F, DM II, PAF not on AC due to GI bleed, CHFrEF 35-40%, nonobstructive CAD, pericarditis s/p pericardiocentesis, recurrent ascites s/p multiple paracentesis presenting w/ recurrent abd pain. multiple similar ep in past, last paracentesis done 01/2020 during admit here. states that for the past week she has noted worsening distention of the abd assoc w/ onset of nausea w/ vomiting and decreasing appetite. has hx of paracentesis in the past, delayed coming to ED during fears of getting covid but due to severity of symptoms came in to request fluid removal. denies any other acute symptoms aside from those mentioned already. denies fevers, chills, admits to chronic body aches. admits to sore throat. denies sob, cough, admits to MSK chest pain which is chronic, unchanged.

## 2020-05-05 NOTE — ED PROVIDER NOTE - PROGRESS NOTE DETAILS
Crispin Jones, Resident: Spoke to [facility], Dr. Kidd who recommended serial troponins and states he will come evaluate the patient.

## 2020-05-05 NOTE — ED PROVIDER NOTE - PMH
Anemia due to chronic kidney disease, unspecified CKD stage    Chronic systolic congestive heart failure    Coronary artery disease, angina presence unspecified, unspecified vessel or lesion type, unspecified whether native or transplanted heart    Diabetes    DM (diabetes mellitus)    End stage renal disease    End stage renal disease on dialysis    HLD (hyperlipidemia)    HTN (hypertension)    Paroxysmal atrial fibrillation    Pericardial effusion    Pleural effusion

## 2020-05-05 NOTE — ED PROVIDER NOTE - OBJECTIVE STATEMENT
Pt is a 60 y.o. F PMH of ESRD on HD M/W/F, T2D, pAfib not on AC due to GI bleed, CHFrEF 35-40%, CAD, pericarditis s/p pericardiocentesis, recurrent ascites s/p multiple paracentesis complains of abdominal pain. Abdominal pain is described as diffuse and associated with nausea and vomiting. Last pericardiocentesis 01/2020, last dialysis session yesterday. Has been delaying medical evaluation due to COVID pandemic, denies any sick contacts. Denies fever, sweats, chills,       Pt is alert, well appearing female, Mongolian speaker, no acute distress, no tachypnea, speaking in full sentences, laying on rt side preferentially, s1s2 normal reg, b/l clear breath sounds, abd is distended, tense but non tender and has fluid thrill +, normal bowel sounds, noted vomitus on clothes, holding vomit bag, neuro exam aox3, cn -212 intact, no asterixis, skin warm, dry. mod turgor, no jaundice, or icterus, good pulses peripherally, normal rom at all joints, left arm av fistula thrill+    plan to check labs, covid, r/o hepatic failure,  acs, chf, fluid overload or worsening uremia, will likley admit for IR drainage of ascites. Pt is a 60 y.o. F PMH of ESRD on HD M/W/F, T2D, pAfib not on AC due to GI bleed, CHFrEF 35-40%, CAD, pericarditis s/p pericardiocentesis, recurrent ascites s/p multiple paracentesis complains of abdominal pain. Abdominal pain is described as diffuse and associated with nausea and vomiting. Last pericardiocentesis 01/2020, last dialysis session yesterday. Has been delaying medical evaluation due to COVID pandemic, denies any sick contacts. Denies fever, sweats, chills.

## 2020-05-05 NOTE — ED PROVIDER NOTE - PHYSICAL EXAMINATION
General: NAD  Head:  NC, AT  Eyes: EOMI, PERRLA, no scleral icterus  Ears: no erythema/drainage  Nose: midline, no bleeding/drainage  Throat: MMM  Cardiac: RRR, no m/r/g, no lower extremity edema  Respiratory: CTABL, no wheezes/rales/rhonchi, equal chest wall expansions  Abdomen: soft, DISTENDED ABDOMEN, MILD DIFFUSE TENDERNESS, no skin changes, no rebound tenderness, no guarding, nonperitonitic  MSK/Vascular: full ROM, soft compartments, warm extremities  Neuro: AAOx3, motor/sensory intact  Psych: calm, cooperative, normal affect

## 2020-05-05 NOTE — ED PROVIDER NOTE - ATTENDING CONTRIBUTION TO CARE
Sagar MO- 59 y/o female with PMH of ESRD on HD M/W/F, DM II, PAF not on AC due to GI bleed, CHFrEF 35-40%, nonobstructive CAD, pericarditis s/p pericardiocentesis, recurrent ascites s/p multiple paracentesis p/w recurrent ascites with abd pain and nausea, vomiting. Pt had last dialysis yesterday .Pt had last paracentesis in Jan and was afraid to come to ED due to COVID pandemic. Now she could not take it anymore and also getting sob. Pt has no direct covid contact. ED  Frank    Pt is alert, well appearing female, [place] speaker, no acute distress, no tachypnea, speaking in full sentences, laying on rt side preferentially, s1s2 normal reg, b/l clear breath sounds, abd is distended, tense but non tender and has fluid thrill +, normal bowel sounds, noted vomitus on clothes, holding vomit bag, neuro exam aox3, cn -212 intact, no asterixis, skin warm, dry. mod turgor, no jaundice, or icterus, good pulses peripherally, normal rom at all joints, left arm av fistula thrill+    plan to check labs, covid, r/o hepatic failure,  acs, chf, fluid overload or worsening uremia, will debraley admit for IR drainage of ascites

## 2020-05-06 LAB
A1C WITH ESTIMATED AVERAGE GLUCOSE RESULT: 6.5 % — HIGH (ref 4–5.6)
ESTIMATED AVERAGE GLUCOSE: 140 MG/DL — HIGH (ref 68–114)
GLUCOSE BLDC GLUCOMTR-MCNC: 110 MG/DL — HIGH (ref 70–99)
GLUCOSE BLDC GLUCOMTR-MCNC: 160 MG/DL — HIGH (ref 70–99)
GLUCOSE BLDC GLUCOMTR-MCNC: 160 MG/DL — HIGH (ref 70–99)
GLUCOSE BLDC GLUCOMTR-MCNC: 181 MG/DL — HIGH (ref 70–99)
SARS-COV-2 RNA SPEC QL NAA+PROBE: DETECTED

## 2020-05-06 PROCEDURE — 93010 ELECTROCARDIOGRAM REPORT: CPT

## 2020-05-06 PROCEDURE — 99232 SBSQ HOSP IP/OBS MODERATE 35: CPT

## 2020-05-06 RX ORDER — ALBUMIN HUMAN 25 %
12.5 VIAL (ML) INTRAVENOUS ONCE
Refills: 0 | Status: DISCONTINUED | OUTPATIENT
Start: 2020-05-06 | End: 2020-05-06

## 2020-05-06 RX ORDER — CARVEDILOL PHOSPHATE 80 MG/1
25 CAPSULE, EXTENDED RELEASE ORAL EVERY 12 HOURS
Refills: 0 | Status: DISCONTINUED | OUTPATIENT
Start: 2020-05-06 | End: 2020-05-09

## 2020-05-06 RX ORDER — HYDRALAZINE HCL 50 MG
10 TABLET ORAL ONCE
Refills: 0 | Status: COMPLETED | OUTPATIENT
Start: 2020-05-06 | End: 2020-05-06

## 2020-05-06 RX ADMIN — TRAMADOL HYDROCHLORIDE 75 MILLIGRAM(S): 50 TABLET ORAL at 15:39

## 2020-05-06 RX ADMIN — Medication 81 MILLIGRAM(S): at 14:27

## 2020-05-06 RX ADMIN — Medication 10 MILLIGRAM(S): at 15:25

## 2020-05-06 RX ADMIN — Medication 325 MILLIGRAM(S): at 14:28

## 2020-05-06 RX ADMIN — Medication 1 TABLET(S): at 22:50

## 2020-05-06 RX ADMIN — Medication 1: at 12:39

## 2020-05-06 RX ADMIN — CALCITRIOL 0.25 MICROGRAM(S): 0.5 CAPSULE ORAL at 14:27

## 2020-05-06 RX ADMIN — SACUBITRIL AND VALSARTAN 1 TABLET(S): 24; 26 TABLET, FILM COATED ORAL at 05:21

## 2020-05-06 RX ADMIN — CARVEDILOL PHOSPHATE 25 MILLIGRAM(S): 80 CAPSULE, EXTENDED RELEASE ORAL at 22:50

## 2020-05-06 RX ADMIN — Medication 1 TABLET(S): at 14:29

## 2020-05-06 RX ADMIN — Medication 1 TABLET(S): at 14:28

## 2020-05-06 NOTE — PROGRESS NOTE ADULT - SUBJECTIVE AND OBJECTIVE BOX
CC: F/u ESRD on HD, recurrent paracentesis,+COVID-19  INTERVAL HPI/OVERNIGHT EVENTS: Pt seen and examined at bedside this AM by Hospitalist and PA. O2 98% on room air. Spoke to Pt via . States mild abd pain and skin tightness around abdomen. With poor appetite. Having a bedside paracentesis by IR today. Denies fever, chills, chest pain, difficulty breathing, n/v or any other complaints.      REVIEW OF SYSTEMS:  CONSTITUTIONAL: No fever, weight loss, or fatigue  NECK: No pain or stiffness  RESPIRATORY: see HPI  CARDIOVASCULAR: No chest pain, palpitations, dizziness, or leg swelling  GASTROINTESTINAL: see HPI   NEUROLOGICAL: No headaches, memory loss, loss of strength, numbness, or tremors  SKIN: No itching, burning, rashes, or lesions   MUSCULOSKELETAL: No joint pain or swelling; No muscle, back, or extremity pain    Allergies  eggs (Anaphylaxis)  No Known Drug Allergies    PAST MEDICAL & SURGICAL HISTORY:  End stage renal disease  Diabetes  Coronary artery disease, angina presence unspecified, unspecified vessel or lesion type, unspecified whether native or transplanted heart  Paroxysmal atrial fibrillation  Anemia due to chronic kidney disease, unspecified CKD stage  Chronic systolic congestive heart failure  Pleural effusion  Pericardial effusion  End stage renal disease on dialysis  HLD (hyperlipidemia)  HTN (hypertension)  DM (diabetes mellitus)  A-V fistula  A-V fistula  Encounter for dialysis catheter care    VITAL SIGNS:  T(C): 36.5 (05-06-20 @ 08:11), Max: 38.1 (05-05-20 @ 19:01)  HR: 73 (05-06-20 @ 08:11) (73 - 93)  BP: 171/95 (05-06-20 @ 08:11) (115/64 - 186/106)  RR: 18 (05-06-20 @ 08:11) (18 - 20)  SpO2: 98% (05-06-20 @ 08:11) (94% - 100%)  Wt(kg): --Vital Signs Last 24 Hrs  T(C): 36.5 (06 May 2020 08:11), Max: 38.1 (05 May 2020 19:01)  T(F): 97.7 (06 May 2020 08:11), Max: 100.5 (05 May 2020 19:01)  HR: 73 (06 May 2020 08:11) (73 - 93)  BP: 171/95 (06 May 2020 08:11) (115/64 - 186/106)  BP(mean): --  RR: 18 (06 May 2020 08:11) (18 - 20)  SpO2: 98% (06 May 2020 08:11) (94% - 100%)    PHYSICAL EXAM:  GENERAL: Pt lying in bed comfortably in NAD  HEAD:  Atraumatic   EYES: EOMI, PERRL, conjunctiva and sclera clear  ENT: Moist mucous membranes  NECK: Supple, No JVD  CHEST/LUNG: Clear to auscultation bilaterally; No rales, rhonchi, wheezing, or rubs. Unlabored respirations  HEART: Regular rate and rhythm; No murmurs, rubs, or gallops  ABDOMEN: Bowel sounds present; Tense, Nontender. No guarding or rigidity   EXTREMITIES:  2+ Peripheral Pulses, brisk capillary refill. No clubbing, cyanosis. Trace edema  NERVOUS SYSTEM:  Alert & Oriented X3, speech clear, FROM x 4 extremities. No deficits   SKIN: No rashes or lesions    LABS:                         12.5   6.60  )-----------( 271      ( 05 May 2020 14:41 )             39.4     05-05    142  |  93<L>  |  27.0<H>  ----------------------------<  173<H>  5.1   |  36.0<H>  |  4.28<H>    Ca    9.8      05 May 2020 14:41    TPro  7.8  /  Alb  3.6  /  TBili  0.7  /  DBili  x   /  AST  18  /  ALT  7   /  AlkPhos  218<H>  05-05    PT/INR - ( 05 May 2020 14:41 )   PT: 10.8 sec;   INR: 0.96 ratio    PTT - ( 05 May 2020 14:41 )  PTT:33.0 sec  Procalcitonin, Serum: 0.21 (05-05)  C-Reactive Protein, Serum: 4.04 (05-05)  Ferritin, Serum: 1887 (05-05)  D-Dmer Assay, Quantitative: 571 (05-05)  COVID-19 PCR: Detected (05-05-20 @ 14:46)    CAPILLARY BLOOD GLUCOSE  POCT Blood Glucose.: 160 mg/dL (06 May 2020 07:55)  POCT Blood Glucose.: 147 mg/dL (05 May 2020 21:36) CC: F/u ESRD on HD, recurrent paracentesis,+COVID-19  INTERVAL HPI/OVERNIGHT EVENTS: Pt seen and examined at bedside this AM by Hospitalist and PA. O2 98% on room air. Spoke to Pt via . States mild abd pain and skin tightness around abdomen. With poor appetite. Having  paracentesis by IR today. Denies fever, chills, chest pain, difficulty breathing, n/v or any other complaints.      REVIEW OF SYSTEMS:  CONSTITUTIONAL: No fever, weight loss, or fatigue  NECK: No pain or stiffness  RESPIRATORY: see HPI  CARDIOVASCULAR: No chest pain, palpitations, dizziness, or leg swelling  GASTROINTESTINAL: see HPI   NEUROLOGICAL: No headaches, memory loss, loss of strength, numbness, or tremors  SKIN: No itching, burning, rashes, or lesions   MUSCULOSKELETAL: No joint pain or swelling; No muscle, back, or extremity pain    Allergies  eggs (Anaphylaxis)  No Known Drug Allergies    PAST MEDICAL & SURGICAL HISTORY:  End stage renal disease  Diabetes  Coronary artery disease, angina presence unspecified, unspecified vessel or lesion type, unspecified whether native or transplanted heart  Paroxysmal atrial fibrillation  Anemia due to chronic kidney disease, unspecified CKD stage  Chronic systolic congestive heart failure  Pleural effusion  Pericardial effusion  End stage renal disease on dialysis  HLD (hyperlipidemia)  HTN (hypertension)  DM (diabetes mellitus)  A-V fistula  A-V fistula  Encounter for dialysis catheter care    VITAL SIGNS:  T(C): 36.5 (05-06-20 @ 08:11), Max: 38.1 (05-05-20 @ 19:01)  HR: 73 (05-06-20 @ 08:11) (73 - 93)  BP: 171/95 (05-06-20 @ 08:11) (115/64 - 186/106)  RR: 18 (05-06-20 @ 08:11) (18 - 20)  SpO2: 98% (05-06-20 @ 08:11) (94% - 100%)  Wt(kg): --Vital Signs Last 24 Hrs  T(C): 36.5 (06 May 2020 08:11), Max: 38.1 (05 May 2020 19:01)  T(F): 97.7 (06 May 2020 08:11), Max: 100.5 (05 May 2020 19:01)  HR: 73 (06 May 2020 08:11) (73 - 93)  BP: 171/95 (06 May 2020 08:11) (115/64 - 186/106)  BP(mean): --  RR: 18 (06 May 2020 08:11) (18 - 20)  SpO2: 98% (06 May 2020 08:11) (94% - 100%)    PHYSICAL EXAM:  GENERAL: Pt lying in bed comfortably in NAD  HEAD:  Atraumatic   EYES: EOMI, PERRL, conjunctiva and sclera clear  ENT: Moist mucous membranes  NECK: Supple, No JVD  CHEST/LUNG: Clear to auscultation bilaterally; No rales, rhonchi, wheezing, or rubs. Unlabored respirations  HEART: Regular rate and rhythm; No murmurs, rubs, or gallops  ABDOMEN: Bowel sounds present; Tense, Nontender. No guarding or rigidity   EXTREMITIES:  2+ Peripheral Pulses, brisk capillary refill. No clubbing, cyanosis. Trace edema  NERVOUS SYSTEM:  Alert & Oriented X3, speech clear, FROM x 4 extremities. No deficits   SKIN: No rashes or lesions    LABS:                         12.5   6.60  )-----------( 271      ( 05 May 2020 14:41 )             39.4     05-05    142  |  93<L>  |  27.0<H>  ----------------------------<  173<H>  5.1   |  36.0<H>  |  4.28<H>    Ca    9.8      05 May 2020 14:41    TPro  7.8  /  Alb  3.6  /  TBili  0.7  /  DBili  x   /  AST  18  /  ALT  7   /  AlkPhos  218<H>  05-05    PT/INR - ( 05 May 2020 14:41 )   PT: 10.8 sec;   INR: 0.96 ratio    PTT - ( 05 May 2020 14:41 )  PTT:33.0 sec  Procalcitonin, Serum: 0.21 (05-05)  C-Reactive Protein, Serum: 4.04 (05-05)  Ferritin, Serum: 1887 (05-05)  D-Dmer Assay, Quantitative: 571 (05-05)  COVID-19 PCR: Detected (05-05-20 @ 14:46)    CAPILLARY BLOOD GLUCOSE  POCT Blood Glucose.: 160 mg/dL (06 May 2020 07:55)  POCT Blood Glucose.: 147 mg/dL (05 May 2020 21:36) CC: F/u ESRD on HD, recurrent paracentesis,+COVID-19  INTERVAL HPI/OVERNIGHT EVENTS: Pt seen and examined at bedside this AM by Hospitalist and PA. O2 98% on room air. Spoke to Pt via . States mild abd pain and skin tightness around abdomen. With poor appetite. Having  paracentesis by IR today. Denies fever, chills, chest pain, difficulty breathing, n/v or any other complaints. Later complained of "colic pain" in stomach that goes to her back after paracentesis that she has had for "years." She has this same pain at home, chronic and usually goes away on its own without taking medications. Pain lessoned with standing pain meds and declined any additional medication. Denies chest pain or pressure, difficulty breathing, numbness or tingling or abd pain or any other complaints.     REVIEW OF SYSTEMS:  CONSTITUTIONAL: No fever, weight loss, or fatigue  NECK: No pain or stiffness  RESPIRATORY: see HPI  CARDIOVASCULAR: No chest pain, palpitations, dizziness, or leg swelling  GASTROINTESTINAL: see HPI   NEUROLOGICAL: No headaches, memory loss, loss of strength, numbness, or tremors  SKIN: No itching, burning, rashes, or lesions   MUSCULOSKELETAL: No joint pain or swelling; No muscle, back, or extremity pain    Allergies  eggs (Anaphylaxis)  No Known Drug Allergies    PAST MEDICAL & SURGICAL HISTORY:  End stage renal disease  Diabetes  Coronary artery disease, angina presence unspecified, unspecified vessel or lesion type, unspecified whether native or transplanted heart  Paroxysmal atrial fibrillation  Anemia due to chronic kidney disease, unspecified CKD stage  Chronic systolic congestive heart failure  Pleural effusion  Pericardial effusion  End stage renal disease on dialysis  HLD (hyperlipidemia)  HTN (hypertension)  DM (diabetes mellitus)  A-V fistula  A-V fistula  Encounter for dialysis catheter care    VITAL SIGNS:  T(C): 36.5 (05-06-20 @ 08:11), Max: 38.1 (05-05-20 @ 19:01)  HR: 73 (05-06-20 @ 08:11) (73 - 93)  BP: 171/95 (05-06-20 @ 08:11) (115/64 - 186/106)  RR: 18 (05-06-20 @ 08:11) (18 - 20)  SpO2: 98% (05-06-20 @ 08:11) (94% - 100%)  Wt(kg): --Vital Signs Last 24 Hrs  T(C): 36.5 (06 May 2020 08:11), Max: 38.1 (05 May 2020 19:01)  T(F): 97.7 (06 May 2020 08:11), Max: 100.5 (05 May 2020 19:01)  HR: 73 (06 May 2020 08:11) (73 - 93)  BP: 171/95 (06 May 2020 08:11) (115/64 - 186/106)  BP(mean): --  RR: 18 (06 May 2020 08:11) (18 - 20)  SpO2: 98% (06 May 2020 08:11) (94% - 100%)    PHYSICAL EXAM:  GENERAL: Pt lying in bed comfortably in NAD  HEAD:  Atraumatic   EYES: EOMI, PERRL, conjunctiva and sclera clear  ENT: Moist mucous membranes  NECK: Supple, No JVD  CHEST/LUNG: Clear to auscultation bilaterally; No rales, rhonchi, wheezing, or rubs. Unlabored respirations  HEART: Regular rate and rhythm; No murmurs, rubs, or gallops  ABDOMEN: Bowel sounds present; Tense, Nontender. No guarding or rigidity. After paracentesis, abd soft, nontender, nondistended. RLQ bandage clean dry and intact.   EXTREMITIES:  2+ Peripheral Pulses, brisk capillary refill. No clubbing, cyanosis. Trace edema  NERVOUS SYSTEM:  Alert & Oriented X3, speech clear, FROM x 4 extremities. No deficits   SKIN: No rashes or lesions    LABS:                         12.5   6.60  )-----------( 271      ( 05 May 2020 14:41 )             39.4     05-05    142  |  93<L>  |  27.0<H>  ----------------------------<  173<H>  5.1   |  36.0<H>  |  4.28<H>    Ca    9.8      05 May 2020 14:41    TPro  7.8  /  Alb  3.6  /  TBili  0.7  /  DBili  x   /  AST  18  /  ALT  7   /  AlkPhos  218<H>  05-05    PT/INR - ( 05 May 2020 14:41 )   PT: 10.8 sec;   INR: 0.96 ratio    PTT - ( 05 May 2020 14:41 )  PTT:33.0 sec  Procalcitonin, Serum: 0.21 (05-05)  C-Reactive Protein, Serum: 4.04 (05-05)  Ferritin, Serum: 1887 (05-05)  D-Dmer Assay, Quantitative: 571 (05-05)  COVID-19 PCR: Detected (05-05-20 @ 14:46)    CAPILLARY BLOOD GLUCOSE  POCT Blood Glucose.: 160 mg/dL (06 May 2020 07:55)  POCT Blood Glucose.: 147 mg/dL (05 May 2020 21:36)

## 2020-05-06 NOTE — DIETITIAN INITIAL EVALUATION ADULT. - OTHER INFO
60yoF w/ pmh ESRD on HD, T2DM, PAF, CHFrEF 35-40%, nonobstructive CAD, pericarditis s/p pericardiocentesis, recurrent ascites s/p multiple paracentesis (last done 1/2020) presenting w/ recurrent abd pain. Pt states that for the past week she has noted worsening distention of the abd assoc w/ onset of nausea w/ vomiting and decreasing appetite, admits to sore throat. Per RN flowsheets Pt completed <25% of dinner last night. Weight appears stable since admission Feb 2019, noted with ascites at this time likely inflating weight, will continue to monitor. Pt remains at risk for malnutrition given poor PO.

## 2020-05-06 NOTE — PROGRESS NOTE ADULT - SUBJECTIVE AND OBJECTIVE BOX
Patient is a 60y old  Female who denies chest pain.         PAST MEDICAL & SURGICAL HISTORY:  End stage renal disease  Diabetes  Coronary artery disease, angina presence unspecified, unspecified vessel or lesion type, unspecified whether native or transplanted heart  Paroxysmal atrial fibrillation  Anemia due to chronic kidney disease, unspecified CKD stage  Chronic systolic congestive heart failure  Pleural effusion  Pericardial effusion  End stage renal disease on dialysis  HLD (hyperlipidemia)  HTN (hypertension)  DM (diabetes mellitus)  A-V fistula  A-V fistula  Encounter for dialysis catheter care    MEDICATIONS  (STANDING):  aspirin enteric coated 81 milliGRAM(s) Oral daily  calcitriol   Capsule 0.25 MICROGram(s) Oral daily  calcium carbonate   1250 mG (OsCal) 1 Tablet(s) Oral three times a day  carvedilol 12.5 milliGRAM(s) Oral every 12 hours  dextrose 5%. 1000 milliLiter(s) (50 mL/Hr) IV Continuous <Continuous>  dextrose 50% Injectable 12.5 Gram(s) IV Push once  dextrose 50% Injectable 25 Gram(s) IV Push once  dextrose 50% Injectable 25 Gram(s) IV Push once  ferrous    sulfate 325 milliGRAM(s) Oral daily  insulin lispro (HumaLOG) corrective regimen sliding scale   SubCutaneous Before meals and at bedtime  Nephro-mariann 1 Tablet(s) Oral daily  pantoprazole    Tablet 40 milliGRAM(s) Oral before breakfast  sacubitril 49 mG/valsartan 51 mG 1 Tablet(s) Oral <User Schedule>    MEDICATIONS  (PRN):  acetaminophen    Suspension .. 975 milliGRAM(s) Oral every 6 hours PRN Moderate Pain (4 - 6)  acetaminophen   Tablet .. 650 milliGRAM(s) Oral every 4 hours PRN Mild Pain (1 - 3)  benzocaine 15 mG/menthol 3.6 mG (Sugar-Free) Lozenge 1 Lozenge Oral every 4 hours PRN Sore Throat  dextrose 40% Gel 15 Gram(s) Oral once PRN Blood Glucose LESS THAN 70 milliGRAM(s)/deciliter  glucagon  Injectable 1 milliGRAM(s) IntraMuscular once PRN Glucose LESS THAN 70 milligrams/deciliter  metoclopramide Injectable 5 milliGRAM(s) IV Push every 6 hours PRN Nausea and/or Vomiting  senna 2 Tablet(s) Oral at bedtime PRN Constipation  traMADol 75 milliGRAM(s) Oral every 6 hours PRN Severe Pain (7 - 10)    Vital Signs Last 24 Hrs  T(C): 36.5 (06 May 2020 08:11), Max: 38.1 (05 May 2020 19:01)  T(F): 97.7 (06 May 2020 08:11), Max: 100.5 (05 May 2020 19:01)  HR: 73 (06 May 2020 08:11) (73 - 90)  BP: 171/95 (06 May 2020 08:11) (115/64 - 182/99)  BP(mean): --  RR: 18 (06 May 2020 08:11) (18 - 20)  SpO2: 98% (06 May 2020 08:11) (94% - 100%)    PHYSICAL EXAM:    *PE deferred to reduce infectious disease   *patient getting paracentesis at bedside currently       ECG: SR + LVH + limb leads switched         I&O's Detail      LABS:                        12.5   6.60  )-----------( 271      ( 05 May 2020 14:41 )             39.4     05-05    142  |  93<L>  |  27.0<H>  ----------------------------<  173<H>  5.1   |  36.0<H>  |  4.28<H>    Ca    9.8      05 May 2020 14:41    TPro  7.8  /  Alb  3.6  /  TBili  0.7  /  DBili  x   /  AST  18  /  ALT  7   /  AlkPhos  218<H>  05-05    CARDIAC MARKERS ( 05 May 2020 22:21 )  x     / 0.48 ng/mL / x     / x     / x      CARDIAC MARKERS ( 05 May 2020 18:28 )  x     / 0.46 ng/mL / x     / x     / x      CARDIAC MARKERS ( 05 May 2020 14:41 )  x     / 0.51 ng/mL / 99 U/L / x     / x          PT/INR - ( 05 May 2020 14:41 )   PT: 10.8 sec;   INR: 0.96 ratio         PTT - ( 05 May 2020 14:41 )  PTT:33.0 sec    BNPSerum Pro-Brain Natriuretic Peptide: >14594 pg/mL (05-05 @ 14:41)    I&O's Detail    Daily     Daily     RADIOLOGY & ADDITIONAL STUDIES:

## 2020-05-06 NOTE — CONSULT NOTE ADULT - CONSULT REASON
ESRD , Recurrent ascites Include Location In Plan?: No Detail Level: Generalized Include Location In Plan?: Yes Detail Level: Zone

## 2020-05-06 NOTE — PROGRESS NOTE ADULT - ASSESSMENT
58 yo F w/ h/o CHF (previous systolic dysfunction with recovery LVEF in 09/2019), ESRD (M/W/F), h/o pericardial effusion, normal coronaries on [facility] 03/23/18, Lambl's excrescence, PAF, DM, HTN who p/w COVID-19+.     1. PAF (not on anticoagulation secondary to h/o GIB)  2. ESRD  3. HTN  4. CHF (recovery of LV function)  5. COVID-19+    *05/04- ECG TWI's appear more consistent with LVH than ischemia; trops minimal in renal failure       PLAN:    1. Continue with coreg, entresto for CHF  2. Coreg increased for HTN

## 2020-05-06 NOTE — DIETITIAN INITIAL EVALUATION ADULT. - PERTINENT LABORATORY DATA
05-05 Na142 mmol/L Glu 173 mg/dL<H> K+ 5.1 mmol/L Cr  4.28 mg/dL<H> BUN 27.0 mg/dL<H> Phos n/a   Alb 3.6 g/dL PAB n/a

## 2020-05-06 NOTE — CONSULT NOTE ADULT - ASSESSMENT
ESRD - Heparin free HD later today     Recurrent ascites - LVP today     Anemia - Hgb stable     Covid + ----> continue supportive measures , sats 98% RA     RO + Rocaltrol and calcium carbonate     HTN / CM - Watch on meds
60 yo F w/ h/o CHF, ESRD (M/W/F), h/o pericardial effusion, normal coronaries on [facility] 03/23/18, Lambl's excrescence, PAF, DM, HTN    1. PAF  2. ESRD  3. HTN  4. CHF    *05/04- ECG TWI's appear more consistent with LVH than ischemia; trops minimal in renal failure     PLAN:    1. Can use hydralazine to lower BP  2. Cardiology will follow   3. Continue with CHF regimen

## 2020-05-06 NOTE — CONSULT NOTE ADULT - SUBJECTIVE AND OBJECTIVE BOX
Patient is a 60y old  Female who presents with a chief complaint of ascites (06 May 2020 11:55)      HPI:  60yoF w/ pmh ESRD on HD M/W/F, DM II, PAF not on AC due to GI bleed, CHFrEF 35-40%, nonobstructive CAD, pericarditis s/p pericardiocentesis, recurrent ascites s/p multiple paracentesis presenting w/ recurrent abd pain. multiple similar ep in past, last paracentesis done 01/2020 during admit here. states that for the past week she has noted worsening distention of the abd assoc w/ onset of nausea w/ vomiting and decreasing appetite. has hx of paracentesis in the past, delayed coming to ED during fears of getting covid but due to severity of symptoms came in to request fluid removal. denies any other acute symptoms aside from those mentioned already. denies fevers, chills, admits to chronic body aches. admits to sore throat. denies sob, cough, admits to MSK chest pain which is chronic, unchanged. (05 May 2020 16:49)    Above noted   Getting bedside paracentesis   Due for HD today   Feels better       PAST MEDICAL & SURGICAL HISTORY:  End stage renal disease  Diabetes  Coronary artery disease, angina presence unspecified, unspecified vessel or lesion type, unspecified whether native or transplanted heart  Paroxysmal atrial fibrillation  Anemia due to chronic kidney disease, unspecified CKD stage  Chronic systolic congestive heart failure  Pleural effusion  Pericardial effusion  End stage renal disease on dialysis  HLD (hyperlipidemia)  HTN (hypertension)  DM (diabetes mellitus)  A-V fistula  A-V fistula  Encounter for dialysis catheter care      FAMILY HISTORY:  No pertinent family history in first degree relatives  Family history of kidney disease in brother (Sibling)      Social History:    MEDICATIONS  (STANDING):  aspirin enteric coated 81 milliGRAM(s) Oral daily  calcitriol   Capsule 0.25 MICROGram(s) Oral daily  calcium carbonate   1250 mG (OsCal) 1 Tablet(s) Oral three times a day  carvedilol 12.5 milliGRAM(s) Oral every 12 hours  dextrose 5%. 1000 milliLiter(s) (50 mL/Hr) IV Continuous <Continuous>  dextrose 50% Injectable 12.5 Gram(s) IV Push once  dextrose 50% Injectable 25 Gram(s) IV Push once  dextrose 50% Injectable 25 Gram(s) IV Push once  ferrous    sulfate 325 milliGRAM(s) Oral daily  insulin lispro (HumaLOG) corrective regimen sliding scale   SubCutaneous Before meals and at bedtime  Nephro-mariann 1 Tablet(s) Oral daily  pantoprazole    Tablet 40 milliGRAM(s) Oral before breakfast  sacubitril 49 mG/valsartan 51 mG 1 Tablet(s) Oral <User Schedule>    MEDICATIONS  (PRN):  acetaminophen    Suspension .. 975 milliGRAM(s) Oral every 6 hours PRN Moderate Pain (4 - 6)  acetaminophen   Tablet .. 650 milliGRAM(s) Oral every 4 hours PRN Mild Pain (1 - 3)  benzocaine 15 mG/menthol 3.6 mG (Sugar-Free) Lozenge 1 Lozenge Oral every 4 hours PRN Sore Throat  dextrose 40% Gel 15 Gram(s) Oral once PRN Blood Glucose LESS THAN 70 milliGRAM(s)/deciliter  glucagon  Injectable 1 milliGRAM(s) IntraMuscular once PRN Glucose LESS THAN 70 milligrams/deciliter  metoclopramide Injectable 5 milliGRAM(s) IV Push every 6 hours PRN Nausea and/or Vomiting  senna 2 Tablet(s) Oral at bedtime PRN Constipation  traMADol 75 milliGRAM(s) Oral every 6 hours PRN Severe Pain (7 - 10)      Allergies    eggs (Anaphylaxis)  No Known Drug Allergies    Intolerances          Vital Signs Last 24 Hrs  T(C): 36.5 (06 May 2020 08:11), Max: 38.1 (05 May 2020 19:01)  T(F): 97.7 (06 May 2020 08:11), Max: 100.5 (05 May 2020 19:01)  HR: 73 (06 May 2020 08:11) (73 - 90)  BP: 171/95 (06 May 2020 08:11) (115/64 - 182/99)  BP(mean): --  RR: 18 (06 May 2020 08:11) (18 - 20)  SpO2: 98% (06 May 2020 08:11) (94% - 100%)  Daily     Daily   I&O's Detail    I&O's Summary      PHYSICAL EXAM:  HEAD:  NCAT  NECK: Supple, +JVD  NERVOUS SYSTEM:  Alert & Oriented X3  CHEST/LUNG:EAE , clear , Dec BS at bases   HEART: Regular rate and rhythm; No rub  ABDOMEN: Softly distended , No rigidity or rebound   EXTREMITIES: decreased edema , Access w/ thrill         LABS:                        12.5   6.60  )-----------( 271      ( 05 May 2020 14:41 )             39.4     05-05    142  |  93<L>  |  27.0<H>  ----------------------------<  173<H>  5.1   |  36.0<H>  |  4.28<H>    Ca    9.8      05 May 2020 14:41    TPro  7.8  /  Alb  3.6  /  TBili  0.7  /  DBili  x   /  AST  18  /  ALT  7   /  AlkPhos  218<H>  05-05    PT/INR - ( 05 May 2020 14:41 )   PT: 10.8 sec;   INR: 0.96 ratio         PTT - ( 05 May 2020 14:41 )  PTT:33.0 sec          PROCEDURE DATE:  05/05/2020          INTERPRETATION:  CLINICAL INFORMATION: Ascites. Abdominal distention.    COMPARISON: None available.    TECHNIQUE: Limited abdominal ultrasound was performed for evaluation of ascites. Images were obtained in all 4 abdominal quadrants.    FINDINGS:     Large amount of abdominal and pelvic ascites in all 4 quadrants. The deepest pocket is in the right lower quadrant measuring 15.6 cm in depth, approximately 1.4 cm from the skin surface.    IMPRESSION:     Large amount of abdominal and pelvic ascites.                RADIOLOGY & ADDITIONAL TESTS:

## 2020-05-06 NOTE — PROGRESS NOTE ADULT - SUBJECTIVE AND OBJECTIVE BOX
Diagnosis: Ascites    Procedure: Paracentesis    : Fredy Guillen MD    Contrast: None    Anesthesia: 1% Lidocaine Subcutaneous    Estimated Blood Loss: Less than 10cc    Specimens: None/ Identified, Labeled, Confirmed and Sent to Lab.    Complications: No Immediate Complications    Anticoagulation: Resume in 24 Hours    Findings & Plan: RLQ Paracentesis performed w 5F Yueh needle after local anesthesia under US guidance. 9500 cc of sterling fluid drained from abdomen.       Please call Interventional Radiology with any questions, concerns, or issues.

## 2020-05-06 NOTE — PROGRESS NOTE ADULT - ASSESSMENT
60yoF w/ pmh ESRD on HD M/W/F, DM II, Parox AF not on AC due to GI bleed, hx chronic combined HF (ef 35-40% w/ [id number]) resolved on repeat echo, nonobstructive CAD, hx pericarditis s/p pericardiocentesis, recurrent ascites s/p multiple paracentesis presenting w/ recurrent abd pain, admitted for acute on chronic ascites.    #acute on chronic ascites - hx mult paracentesis in past, last done 01/2020. no hx cirrhosis. prior hx chronic combined HF (ef 35-40% w/ [id number]) resolved on repeat echo 12/2019. suspect ascites as function of volume overload from esrd. Therapeutic paracentesis 5/5    #elevated troponin, asymptomatic - Patient w/ chronically elevated trop usually between 0.5-0.9 @ baseline given esrd. chronic CP unchanged per patient.  -Seen by cardiology team. ECG TWI's appear more consistent with LVH than ischemia; trops minimal in renal failure. No acute chest pain or complaitns     #esrd on HD (MWF) - stable. Spoke with Dr Kevin, to get HD today after paracentesis     #dm2 on long term insulin therapy complicated by asymptomatic hyperglycemia, stable.   - a1c 6.5. iss + fs achs. Trending Accuchecks. Continue to hold home lantus given due to poor appetite in setting of ascites.     dvt ppx. scd for now. no pharm ppx in anticipated of paracentesis    dispo. pending improvement in ascites. Palliative team consulted by MD martinez fu. pmd. nephro. cardio. 60yoF w/ pmh ESRD on HD M/W/F, DM II, Parox AF not on AC due to GI bleed, hx chronic combined HF (ef 35-40% w/ [id number]) resolved on repeat echo, nonobstructive CAD, hx pericarditis s/p pericardiocentesis, recurrent ascites s/p multiple paracentesis presenting w/ recurrent abd pain, admitted for acute on chronic ascites.    #acute on chronic ascites - hx mult paracentesis in past, last done 01/2020. no hx cirrhosis. prior hx chronic combined HF (ef 35-40% w/ [id number]) resolved on repeat echo 12/2019. suspect ascites as function of volume overload from esrd. Therapeutic paracentesis 5/5, 9.5L removed     #elevated troponin, asymptomatic - Patient w/ chronically elevated trop usually between 0.5-0.9 @ baseline given esrd. chronic and intermittent CP/abdominal pain unchanged per patient.  -Seen by cardiology team. ECG TWI's appear more consistent with LVH than ischemia; trops minimal in renal failure. No acute chest pain or complaints. Repeat EKG 5/6 shows NSR HR 74bpm, no acute changes from previous     #esrd on HD (MWF) - stable. Spoke with Dr Kevin, to get HD today after paracentesis     #dm2 on long term insulin therapy complicated by asymptomatic hyperglycemia, stable.   - a1c 6.5. iss + fs achs. Trending Accuchecks. Continue to hold home lantus given due to poor appetite in setting of ascites.     #HTN  -Cardiology following, carvedilol added  -continue to trend    dvt ppx. scd for now. no pharm ppx in anticipated of paracentesis    dispo. pending improvement in ascites. Palliative team consulted by MD    outpt fu. pmd. nephro. cardio.

## 2020-05-07 ENCOUNTER — TRANSCRIPTION ENCOUNTER (OUTPATIENT)
Age: 60
End: 2020-05-07

## 2020-05-07 LAB
ANION GAP SERPL CALC-SCNC: 8 MMOL/L — SIGNIFICANT CHANGE UP (ref 5–17)
BUN SERPL-MCNC: 17 MG/DL — SIGNIFICANT CHANGE UP (ref 8–20)
CALCIUM SERPL-MCNC: 8.6 MG/DL — SIGNIFICANT CHANGE UP (ref 8.6–10.2)
CHLORIDE SERPL-SCNC: 96 MMOL/L — LOW (ref 98–107)
CO2 SERPL-SCNC: 32 MMOL/L — HIGH (ref 22–29)
CREAT SERPL-MCNC: 3.56 MG/DL — HIGH (ref 0.5–1.3)
GLUCOSE BLDC GLUCOMTR-MCNC: 128 MG/DL — HIGH (ref 70–99)
GLUCOSE BLDC GLUCOMTR-MCNC: 129 MG/DL — HIGH (ref 70–99)
GLUCOSE BLDC GLUCOMTR-MCNC: 171 MG/DL — HIGH (ref 70–99)
GLUCOSE BLDC GLUCOMTR-MCNC: 178 MG/DL — HIGH (ref 70–99)
GLUCOSE SERPL-MCNC: 125 MG/DL — HIGH (ref 70–99)
HCT VFR BLD CALC: 31.1 % — LOW (ref 34.5–45)
HGB BLD-MCNC: 9.6 G/DL — LOW (ref 11.5–15.5)
MCHC RBC-ENTMCNC: 29.5 PG — SIGNIFICANT CHANGE UP (ref 27–34)
MCHC RBC-ENTMCNC: 30.9 GM/DL — LOW (ref 32–36)
MCV RBC AUTO: 95.7 FL — SIGNIFICANT CHANGE UP (ref 80–100)
PHOSPHATE SERPL-MCNC: 2.7 MG/DL — SIGNIFICANT CHANGE UP (ref 2.4–4.7)
PLATELET # BLD AUTO: 206 K/UL — SIGNIFICANT CHANGE UP (ref 150–400)
POTASSIUM SERPL-MCNC: 4.4 MMOL/L — SIGNIFICANT CHANGE UP (ref 3.5–5.3)
POTASSIUM SERPL-SCNC: 4.4 MMOL/L — SIGNIFICANT CHANGE UP (ref 3.5–5.3)
RBC # BLD: 3.25 M/UL — LOW (ref 3.8–5.2)
RBC # FLD: 14.4 % — SIGNIFICANT CHANGE UP (ref 10.3–14.5)
SODIUM SERPL-SCNC: 136 MMOL/L — SIGNIFICANT CHANGE UP (ref 135–145)
WBC # BLD: 5 K/UL — SIGNIFICANT CHANGE UP (ref 3.8–10.5)
WBC # FLD AUTO: 5 K/UL — SIGNIFICANT CHANGE UP (ref 3.8–10.5)

## 2020-05-07 PROCEDURE — 99223 1ST HOSP IP/OBS HIGH 75: CPT | Mod: CS

## 2020-05-07 PROCEDURE — 99232 SBSQ HOSP IP/OBS MODERATE 35: CPT

## 2020-05-07 RX ORDER — CARVEDILOL PHOSPHATE 80 MG/1
1 CAPSULE, EXTENDED RELEASE ORAL
Qty: 60 | Refills: 0
Start: 2020-05-07 | End: 2020-06-05

## 2020-05-07 RX ORDER — ACETAMINOPHEN 500 MG
2 TABLET ORAL
Qty: 0 | Refills: 0 | DISCHARGE
Start: 2020-05-07

## 2020-05-07 RX ORDER — LACTULOSE 10 G/15ML
30 SOLUTION ORAL ONCE
Refills: 0 | Status: COMPLETED | OUTPATIENT
Start: 2020-05-07 | End: 2020-05-07

## 2020-05-07 RX ADMIN — CALCITRIOL 0.25 MICROGRAM(S): 0.5 CAPSULE ORAL at 12:50

## 2020-05-07 RX ADMIN — PANTOPRAZOLE SODIUM 40 MILLIGRAM(S): 20 TABLET, DELAYED RELEASE ORAL at 05:04

## 2020-05-07 RX ADMIN — CARVEDILOL PHOSPHATE 25 MILLIGRAM(S): 80 CAPSULE, EXTENDED RELEASE ORAL at 17:26

## 2020-05-07 RX ADMIN — SACUBITRIL AND VALSARTAN 1 TABLET(S): 24; 26 TABLET, FILM COATED ORAL at 05:04

## 2020-05-07 RX ADMIN — Medication 81 MILLIGRAM(S): at 12:50

## 2020-05-07 RX ADMIN — Medication 1: at 20:49

## 2020-05-07 RX ADMIN — CARVEDILOL PHOSPHATE 25 MILLIGRAM(S): 80 CAPSULE, EXTENDED RELEASE ORAL at 05:04

## 2020-05-07 RX ADMIN — Medication 1 TABLET(S): at 20:31

## 2020-05-07 RX ADMIN — Medication 1: at 12:49

## 2020-05-07 RX ADMIN — Medication 1 TABLET(S): at 12:55

## 2020-05-07 RX ADMIN — Medication 1 TABLET(S): at 05:04

## 2020-05-07 RX ADMIN — Medication 325 MILLIGRAM(S): at 12:50

## 2020-05-07 RX ADMIN — LACTULOSE 30 GRAM(S): 10 SOLUTION ORAL at 12:50

## 2020-05-07 RX ADMIN — Medication 1 TABLET(S): at 12:50

## 2020-05-07 NOTE — DISCHARGE NOTE PROVIDER - CARE PROVIDER_API CALL
Kelvin Kevin (DO)  Medicine  [address], [place] [address]  Phone: [phone]  Fax: [phone]  Follow Up Time: 1-3 days    Camille Lopez)  Family Medicine  [address] [place] [address]  Phone: [phone]  Fax: [phone]  Follow Up Time: 1-3 days

## 2020-05-07 NOTE — PROGRESS NOTE ADULT - ASSESSMENT
60yoF w/ pmh ESRD on HD M/W/F, DM II, Parox AF not on AC due to GI bleed, hx chronic combined HF (ef 35-40% w/ [id number]) resolved on repeat echo, nonobstructive CAD, hx pericarditis s/p pericardiocentesis, recurrent ascites s/p multiple paracentesis presenting w/ recurrent abd pain, admitted for acute on chronic ascites.  S/P HD and RLQ IR guided paracentesis on 5/6/2020    #acute on chronic ascites - hx mult paracentesis in past, last done 01/2020. no hx cirrhosis. prior hx chronic combined HF (ef 35-40% w/ [id number]) resolved on repeat echo 12/2019. suspect ascites as function of volume overload from esrd. Therapeutic paracentesis 5/6, 9.5L removed, Cardio consulted- continue enetresto, increase Coreg    #elevated troponin, asymptomatic - Patient w/ chronically elevated trop usually between 0.5-0.9 @ baseline given esrd. chronic and intermittent CP/abdominal pain unchanged per patient.  -Seen by cardiology team. ECG TWI's appear more consistent with LVH than ischemia; trops minimal in renal failure. No acute chest pain or complaints. Repeat EKG 5/6 shows NSR HR 74bpm, no acute changes from previous     #esrd on HD (MWF) - s/p HD, d/w with [facility] to arrange to resume outpt HD    #dm2 on long term insulin therapy complicated by asymptomatic hyperglycemia, stable.   - a1c 6.5. iss + fs achs. Trending Accuchecks. Continue to hold home lantus given due to poor appetite in setting of ascites.     #HTN  -Cardiology following, carvedilol added  -continue to trend    dvt ppx. scd for now. no pharm ppx in anticipated of paracentesis    dispo. possible d/c later today or tomorrow- D/W SW/[facility] - need to resume HD as outpt/ home aides    outpt fu. pmd. nephro. cardio.

## 2020-05-07 NOTE — PROGRESS NOTE ADULT - SUBJECTIVE AND OBJECTIVE BOX
NEPHROLOGY INTERVAL HPI/OVERNIGHT EVENTS:    (-) 1.5 kg with HD 5-7  feels better   BP stable     MEDICATIONS  (STANDING):  aspirin enteric coated 81 milliGRAM(s) Oral daily  calcitriol   Capsule 0.25 MICROGram(s) Oral daily  calcium carbonate   1250 mG (OsCal) 1 Tablet(s) Oral three times a day  carvedilol 25 milliGRAM(s) Oral every 12 hours  dextrose 5%. 1000 milliLiter(s) (50 mL/Hr) IV Continuous <Continuous>  dextrose 50% Injectable 12.5 Gram(s) IV Push once  dextrose 50% Injectable 25 Gram(s) IV Push once  dextrose 50% Injectable 25 Gram(s) IV Push once  ferrous    sulfate 325 milliGRAM(s) Oral daily  insulin lispro (HumaLOG) corrective regimen sliding scale   SubCutaneous Before meals and at bedtime  Nephro-mariann 1 Tablet(s) Oral daily  pantoprazole    Tablet 40 milliGRAM(s) Oral before breakfast  sacubitril 49 mG/valsartan 51 mG 1 Tablet(s) Oral <User Schedule>    MEDICATIONS  (PRN):  acetaminophen    Suspension .. 975 milliGRAM(s) Oral every 6 hours PRN Moderate Pain (4 - 6)  acetaminophen   Tablet .. 650 milliGRAM(s) Oral every 4 hours PRN Mild Pain (1 - 3)  benzocaine 15 mG/menthol 3.6 mG (Sugar-Free) Lozenge 1 Lozenge Oral every 4 hours PRN Sore Throat  dextrose 40% Gel 15 Gram(s) Oral once PRN Blood Glucose LESS THAN 70 milliGRAM(s)/deciliter  glucagon  Injectable 1 milliGRAM(s) IntraMuscular once PRN Glucose LESS THAN 70 milligrams/deciliter  metoclopramide Injectable 5 milliGRAM(s) IV Push every 6 hours PRN Nausea and/or Vomiting  senna 2 Tablet(s) Oral at bedtime PRN Constipation  traMADol 75 milliGRAM(s) Oral every 6 hours PRN Severe Pain (7 - 10)      Allergies    eggs (Anaphylaxis)  No Known Drug Allergies    Intolerances            Vital Signs Last 24 Hrs  T(C): 36.7 (07 May 2020 08:06), Max: 36.8 (06 May 2020 15:02)  T(F): 98 (07 May 2020 08:06), Max: 98.3 (06 May 2020 15:02)  HR: 68 (07 May 2020 08:06) (68 - 81)  BP: 142/80 (07 May 2020 08:06) (133/64 - 194/90)  BP(mean): --  RR: 16 (07 May 2020 08:06) (16 - 20)  SpO2: 92% (07 May 2020 08:06) (92% - 99%)  Daily     Daily Weight in k (06 May 2020 13:57)  I&O's Detail    06 May 2020 07:01  -  07 May 2020 07:00  --------------------------------------------------------  IN:  Total IN: 0 mL    OUT:    Other: 1500 mL  Total OUT: 1500 mL    Total NET: -1500 mL        I&O's Summary    06 May 2020 07:01  -  07 May 2020 07:00  --------------------------------------------------------  IN: 0 mL / OUT: 1500 mL / NET: -1500 mL        PHYSICAL EXAM:HEAD:  NCAT  NECK: Supple, +JVD  NERVOUS SYSTEM:  Alert & Oriented X3  CHEST/LUNG:EAE , clear , Dec BS at bases   HEART: Regular rate and rhythm; No rub  ABDOMEN:less distention  , No rigidity or rebound   EXTREMITIES: decreased edema , Access w/ thrill   LABS:                        9.6    5.00  )-----------( 206      ( 07 May 2020 07:37 )             31.1     05-07    136  |  96<L>  |  17.0  ----------------------------<  125<H>  4.4   |  32.0<H>  |  3.56<H>    Ca    8.6      07 May 2020 07:37  Phos  2.7     05-07    TPro  7.8  /  Alb  3.6  /  TBili  0.7  /  DBili  x   /  AST  18  /  ALT  7   /  AlkPhos  218<H>  05-05    PT/INR - ( 05 May 2020 14:41 )   PT: 10.8 sec;   INR: 0.96 ratio         PTT - ( 05 May 2020 14:41 )  PTT:33.0 sec    Phosphorus Level, Serum: 2.7 mg/dL ( @ 07:37)          RADIOLOGY & ADDITIONAL TESTS:

## 2020-05-07 NOTE — PROGRESS NOTE ADULT - ASSESSMENT
60 yo F w/ h/o CHF (previous systolic dysfunction with recovery LVEF in 09/2019), ESRD (M/W/F), h/o pericardial effusion, normal coronaries on [facility] 03/23/18, Lambl's excrescence, PAF, DM, HTN who p/w COVID-19+.     1. PAF (not on anticoagulation secondary to h/o GIB)  2. ESRD  3. HTN  4. CHF (recovery of LV function)  5. COVID-19+    *05/04- ECG TWI's appear more consistent with LVH than ischemia; trops minimal in renal failure       PLAN:    1. Continue with coreg, entresto for CHF  2. Supportive care

## 2020-05-07 NOTE — DISCHARGE NOTE PROVIDER - NSDCFUADDAPPT_GEN_ALL_CORE_FT
It has been determined that you no longer need hospitalization and can recover while remaining in self-quarantine at home for 14 days. You should follow the prevention steps below until a healthcare provider or [facility] says you can return to your normal activities.  Please remain in quarantine until then. You should restrict activities outside your home except for getting medical care.  Do not go to work, school or public areas.  Avoid using public transportation.  Separate yourself from other people and animals in your home.  Cover your coughs and sneezes.  Clean your hands often. Avoid sharing personal household items. Clean all high touch surfaces. Monitor your symptoms, if you have a medical emergency call 911. It has been determined that you no longer need hospitalization and can recover while remaining in self-quarantine at home for 14 days. You should follow the prevention steps below until a healthcare provider or [facility] says you can return to your normal activities.  Please remain in quarantine until then. You should restrict activities outside your home except for getting medical care.  Do not go to work, school or public areas.  Avoid using public transportation.  Separate yourself from other people and animals in your home.  Cover your coughs and sneezes.  Clean your hands often. Avoid sharing personal household items. Clean all high touch surfaces. Monitor your symptoms, if you have a medical emergency call 911.

## 2020-05-07 NOTE — DISCHARGE NOTE PROVIDER - PROVIDER TOKENS
PROVIDER:[TOKEN:[[id number]],FOLLOWUP:[1-3 days]],PROVIDER:[TOKEN:[[id number]],FOLLOWUP:[1-3 days]]

## 2020-05-07 NOTE — DISCHARGE NOTE PROVIDER - HOSPITAL COURSE
60yoF w/ pmh ESRD on HD M/W/F, DM II, Parox AF not on AC due to GI bleed, hx chronic combined HF (ef 35-40% w/ [id number]) resolved on repeat echo, nonobstructive CAD, hx pericarditis s/p pericardiocentesis, recurrent ascites s/p multiple paracentesis presenting w/ recurrent abd pain, admitted for acute on chronic ascites.  S/P HD and RLQ IR guided paracentesis on 5/6/2020,  Pt admitted with acute on chronic ascites - hx mult paracentesis in past, last done 01/2020. no hx cirrhosis. prior hx chronic combined HF (ef 35-40% w/ [id number]) resolved on repeat echo 12/2019. suspect ascites as function of volume overload from esrd. Therapeutic paracentesis 5/6, 9.5L removed, Cardio consulted- continue enetresto, increase Coreg        Pt had an elevated troponin, asymptomatic - Patient w/ chronically elevated trop usually between 0.5-0.9 @ baseline given esrd. chronic and intermittent CP/abdominal pain unchanged per patient.    -Seen by cardiology team. ECG TWI's appear more consistent with LVH than ischemia; trops minimal in renal failure. No acute chest pain or complaints. Repeat EKG 5/6 shows NSR HR 74bpm, no acute changes from previous         Pt is s/p HD. will arrange for new outpt HD for COVID +                                     9.6      5.00  )-----------( 206      ( 07 May 2020 07:37 )               31.1             05-07        136  |  96<L>  |  17.0    ----------------------------<  125<H>    4.4   |  32.0<H>  |  3.56<H>        Ca    8.6      07 May 2020 07:37    Phos  2.7     05-07        TPro  7.8  /  Alb  3.6  /  TBili  0.7  /  DBili  x   /  AST  18  /  ALT  7   /  AlkPhos  218<H>  05-05        COVID-19 PCR: Detected (05 May 2020 14:46) 60yoF w/ pmh ESRD on HD M/W/F, DM II, Parox AF not on AC due to GI bleed, hx chronic combined HF (ef 35-40% w/ [id number]) resolved on repeat echo, nonobstructive CAD, hx pericarditis s/p pericardiocentesis, recurrent ascites s/p multiple paracentesis presenting w/ recurrent abd pain, admitted for acute on chronic ascites.  S/P HD and RLQ IR guided paracentesis on 5/6/2020,  Pt admitted with acute on chronic ascites - hx mult paracentesis in past, last done 01/2020. no hx cirrhosis. prior hx chronic combined HF (ef 35-40% w/ [id number]) resolved on repeat echo 12/2019. suspect ascites as function of volume overload from esrd. Therapeutic paracentesis 5/6, 9.5L removed, Cardio consulted- continue enetresto, increased Coreg        Pt had an elevated troponin, asymptomatic - Patient w/ chronically elevated trop usually between 0.5-0.9 @ baseline given esrd. chronic and intermittent CP/abdominal pain unchanged per patient.    -Seen by cardiology team. ECG TWI's appear more consistent with LVH than ischemia; trops minimal in renal failure. No acute chest pain or complaints. Repeat EKG 5/6 shows NSR HR 74bpm, no acute changes from previous         Pt is s/p HD. will arrange for new outpt HD for COVID +                                     9.6      5.00  )-----------( 206      ( 07 May 2020 07:37 )               31.1             05-07        136  |  96<L>  |  17.0    ----------------------------<  125<H>    4.4   |  32.0<H>  |  3.56<H>        Ca    8.6      07 May 2020 07:37    Phos  2.7     05-07        TPro  7.8  /  Alb  3.6  /  TBili  0.7  /  DBili  x   /  AST  18  /  ALT  7   /  AlkPhos  218<H>  05-05        COVID-19 PCR: Detected (05 May 2020 14:46)

## 2020-05-07 NOTE — DISCHARGE NOTE PROVIDER - NSDCCPCAREPLAN_GEN_ALL_CORE_FT
PRINCIPAL DISCHARGE DIAGNOSIS  Diagnosis: Other ascites  Assessment and Plan of Treatment: s/p paracentesis      SECONDARY DISCHARGE DIAGNOSES  Diagnosis: COVID-19 virus detected  Assessment and Plan of Treatment: saturating well on RA    Diagnosis: Uremia  Assessment and Plan of Treatment: s/p HD PRINCIPAL DISCHARGE DIAGNOSIS  Diagnosis: Other ascites  Assessment and Plan of Treatment: s/p paracentesis      SECONDARY DISCHARGE DIAGNOSES  Diagnosis: HTN (hypertension)  Assessment and Plan of Treatment: Continue medications as prescribed    Diagnosis: ESRD on dialysis  Assessment and Plan of Treatment: Continue HD as scheduled    Diagnosis: COVID-19 virus detected  Assessment and Plan of Treatment: saturating well on RA

## 2020-05-07 NOTE — PROGRESS NOTE ADULT - ASSESSMENT
· Assessment		  ESRD - If released , will follow up in HD tomorrow     Recurrent ascites - LVP 9.5 L , feels better     Anemia - Hgb stable     Covid + ----> continue supportive measures , sats 98% RA     RO + Rocaltrol and calcium carbonate     HTN / CM - Watch on meds

## 2020-05-07 NOTE — PHYSICAL THERAPY INITIAL EVALUATION ADULT - PERTINENT HX OF CURRENT PROBLEM, REHAB EVAL
61 yo female admitted with recurrent abd pain, admitted for acute on chronic ascites.  S/P HD and RLQ IR guided paracentesis on 5/6/2020; +COVID

## 2020-05-07 NOTE — PROGRESS NOTE ADULT - SUBJECTIVE AND OBJECTIVE BOX
Patient is a 60y old  Female who presents with a chief complaint of ascites (07 May 2020 15:00)        PAST MEDICAL & SURGICAL HISTORY:  End stage renal disease  Diabetes  Coronary artery disease, angina presence unspecified, unspecified vessel or lesion type, unspecified whether native or transplanted heart  Paroxysmal atrial fibrillation  Anemia due to chronic kidney disease, unspecified CKD stage  Chronic systolic congestive heart failure  Pleural effusion  Pericardial effusion  End stage renal disease on dialysis  HLD (hyperlipidemia)  HTN (hypertension)  DM (diabetes mellitus)  A-V fistula  A-V fistula  Encounter for dialysis catheter care  MEDICATIONS  (STANDING):  aspirin enteric coated 81 milliGRAM(s) Oral daily  calcitriol   Capsule 0.25 MICROGram(s) Oral daily  calcium carbonate   1250 mG (OsCal) 1 Tablet(s) Oral three times a day  carvedilol 25 milliGRAM(s) Oral every 12 hours  dextrose 5%. 1000 milliLiter(s) (50 mL/Hr) IV Continuous <Continuous>  dextrose 50% Injectable 12.5 Gram(s) IV Push once  dextrose 50% Injectable 25 Gram(s) IV Push once  dextrose 50% Injectable 25 Gram(s) IV Push once  ferrous    sulfate 325 milliGRAM(s) Oral daily  insulin lispro (HumaLOG) corrective regimen sliding scale   SubCutaneous Before meals and at bedtime  Nephro-mariann 1 Tablet(s) Oral daily  pantoprazole    Tablet 40 milliGRAM(s) Oral before breakfast  sacubitril 49 m  G/valsartan 51 mG 1 Tablet(s) Oral <User Schedule>    MEDICATIONS  (PRN):  acetaminophen    Suspension .. 975 milliGRAM(s) Oral every 6 hours PRN Moderate Pain (4 - 6)  acetaminophen   Tablet .. 650 milliGRAM(s) Oral every 4 hours PRN Mild Pain (1 - 3)  benzocaine 15 mG/menthol 3.6 mG (Sugar-Free) Lozenge 1 Lozenge Oral every 4 hours PRN Sore Throat  dextrose 40% Gel 15 Gram(s) Oral once PRN Blood Glucose LESS THAN 70 milliGRAM(s)/deciliter  glucagon  Injectable 1 milliGRAM(s) IntraMuscular once PRN Glucose LESS THAN 70 milligrams/deciliter  metoclopramide Injectable 5 milliGRAM(s) IV Push every 6 hours PRN Nausea and/or Vomiting  senna 2 Tablet(s) Oral at bedtime PRN Constipation  traMADol 75 milliGRAM(s) Oral every 6 hours PRN Severe Pain (7 - 10)    Vital Signs Last 24 Hrs  T(C): 36.7 (07 May 2020 08:06), Max: 36.7 (06 May 2020 23:35)  T(F): 98 (07 May 2020 08:06), Max: 98.1 (06 May 2020 23:35)  HR: 66 (07 May 2020 16:13) (66 - 80)  BP: 155/83 (07 May 2020 16:13) (133/64 - 155/83)  BP(mean): --  RR: 20 (07 May 2020 16:13) (16 - 20)  SpO2: 99% (07 May 2020 16:13) (92% - 99%)    PHYSICAL EXAM:      *PE deferred to reduce infectious disease   *patient getting paracentesis at bedside currently       ECG: SR + LVH + limb leads switched         I&O's Detail    06 May 2020 07:01  -  07 May 2020 07:00  --------------------------------------------------------  IN:  Total IN: 0 mL    OUT:    Other: 1500 mL  Total OUT: 1500 mL    Total NET: -1500 mL          LABS:                        9.6    5.00  )-----------( 206      ( 07 May 2020 07:37 )             31.1     05-07    136  |  96<L>  |  17.0  ----------------------------<  125<H>  4.4   |  32.0<H>  |  3.56<H>    Ca    8.6      07 May 2020 07:37  Phos  2.7     05-07              BNP  I&O's Detail    06 May 2020 07:01  -  07 May 2020 07:00  --------------------------------------------------------  IN:  Total IN: 0 mL    OUT:    Other: 1500 mL  Total OUT: 1500 mL    Total NET: -1500 mL        Daily     Daily     RADIOLOGY & ADDITIONAL STUDIES:

## 2020-05-07 NOTE — CONSULT NOTE ADULT - SUBJECTIVE AND OBJECTIVE BOX
This is a 59 yo F PMH of ESRD on HD for past three years, DMII, PAF, CHF Pericarditis, and Recurrent Ascitis. Last Paracentesis was e mos ago.She was admitted with abdominal pain distended and firm abdomen. Began having decreaseing appetite N/V D constipation. Very noncompliant with dietary and fluid restrictions; refusing to go to hospital, until pain too much to bear.  Paracentesis herre drained large volume of fluid. No fever, CP, Palpitations dizziness cough, SOB chronic musculoskeletal pain including Chest wall.  Found to be + COVID 19, is breathing without difficulty on room air.  HPI:  60yoF w/ pmh ESRD on HD M/W/F, DM II, PAF not on AC due to GI bleed, CHFrEF 35-40%, nonobstructive CAD, pericarditis s/p pericardiocentesis, recurrent ascites s/p multiple paracentesis presenting w/ recurrent abd pain. multiple similar ep in past, last paracentesis done 01/2020 during admit here. states that for the past week she has noted worsening distention of the abd assoc w/ onset of nausea w/ vomiting and decreasing appetite. has hx of paracentesis in the past, delayed coming to ED during fears of getting covid but due to severity of symptoms came in to request fluid removal. denies any other acute symptoms aside from those mentioned already. denies fevers, chills, admits to chronic body aches. admits to sore throat. denies sob, cough, admits to MSK chest pain which is chronic, unchanged. (05 May 2020 16:49)      PERTINENT PMH REVIEWED: Yes No    PAST MEDICAL & SURGICAL HISTORY:  End stage renal disease  Diabetes  Coronary artery disease, angina presence unspecified, unspecified vessel or lesion type, unspecified whether native or transplanted heart  Paroxysmal atrial fibrillation  Anemia due to chronic kidney disease, unspecified CKD stage  Chronic systolic congestive heart failure  Pleural effusion  Pericardial effusion  End stage renal disease on dialysis  HLD (hyperlipidemia)  HTN (hypertension)  DM (diabetes mellitus)  A-V fistula  A-V fistula  Encounter for dialysis catheter care      SOCIAL HISTORY:  EtOH Yes   No                                    Drugs  Yes  No                                    smoker  nonsmoker                                    Admitted from: home  SNF _________ [facility] ________Surrogate/HCP/Guardian: Phone#:    FAMILY HISTORY:  No pertinent family history in first degree relatives  Family history of kidney disease in brother (Sibling)      Allergies    eggs (Anaphylaxis)  No Known Drug Allergies    Intolerances        Baseline ADLs (prior to admission):  Independent/ Dependent      Present Symptoms:     Dyspnea: 0 1 2 3   Nausea/Vomiting: Yes No  Anxiety:  Yes No  Depression: Yes No  Fatigue: Yes No  Loss of appetite: Yes No    Pain:             Character-            Duration-            Effect-            Factors-            Frequency-            Location-            Severity-    Review of Systems: Reviewed                     Negative:                     Positive:  Unable to obtain due to poor mentation   All others negative    MEDICATIONS  (STANDING):  aspirin enteric coated 81 milliGRAM(s) Oral daily  calcitriol   Capsule 0.25 MICROGram(s) Oral daily  calcium carbonate   1250 mG (OsCal) 1 Tablet(s) Oral three times a day  carvedilol 25 milliGRAM(s) Oral every 12 hours  dextrose 5%. 1000 milliLiter(s) (50 mL/Hr) IV Continuous <Continuous>  dextrose 50% Injectable 12.5 Gram(s) IV Push once  dextrose 50% Injectable 25 Gram(s) IV Push once  dextrose 50% Injectable 25 Gram(s) IV Push once  ferrous    sulfate 325 milliGRAM(s) Oral daily  insulin lispro (HumaLOG) corrective regimen sliding scale   SubCutaneous Before meals and at bedtime  Nephro-mariann 1 Tablet(s) Oral daily  pantoprazole    Tablet 40 milliGRAM(s) Oral before breakfast  sacubitril 49 mG/valsartan 51 mG 1 Tablet(s) Oral <User Schedule>    MEDICATIONS  (PRN):  acetaminophen    Suspension .. 975 milliGRAM(s) Oral every 6 hours PRN Moderate Pain (4 - 6)  acetaminophen   Tablet .. 650 milliGRAM(s) Oral every 4 hours PRN Mild Pain (1 - 3)  benzocaine 15 mG/menthol 3.6 mG (Sugar-Free) Lozenge 1 Lozenge Oral every 4 hours PRN Sore Throat  dextrose 40% Gel 15 Gram(s) Oral once PRN Blood Glucose LESS THAN 70 milliGRAM(s)/deciliter  glucagon  Injectable 1 milliGRAM(s) IntraMuscular once PRN Glucose LESS THAN 70 milligrams/deciliter  metoclopramide Injectable 5 milliGRAM(s) IV Push every 6 hours PRN Nausea and/or Vomiting  senna 2 Tablet(s) Oral at bedtime PRN Constipation  traMADol 75 milliGRAM(s) Oral every 6 hours PRN Severe Pain (7 - 10)      PHYSICAL EXAM: Due to the nature of this patient's COVID-19 isolation status, no bedside physical exam was done to limit spread of infection. Examination highlights were provided by bedside nurse and primary team. Objective data were reviewed in detail.    LABS:                        9.6    5.00  )-----------( 206      ( 07 May 2020 07:37 )             31.1     05-07    136  |  96<L>  |  17.0  ----------------------------<  125<H>  4.4   |  32.0<H>  |  3.56<H>    Ca    8.6      07 May 2020 07:37  Phos  2.7     05-07          I&O's Summary    06 May 2020 07:01  -  07 May 2020 07:00  --------------------------------------------------------  IN: 0 mL / OUT: 1500 mL / NET: -1500 mL        RADIOLOGY & ADDITIONAL STUDIES:    ADVANCE DIRECTIVES:   DNR YES NO  Completed on:                     MOLST  YES NO   Completed on:  Living Will  YES NO   Completed on:      COUNSELING:    Face to face meeting to discuss Advanced Care Planning - Time Spent ______ Minutes.  See goals of care note.    More than 50% time spent in counseling and coordinating care. ______ Minutes.     Thank you for the opportunity to assist with the care of this patient.   [facility] [phone].

## 2020-05-07 NOTE — DISCHARGE NOTE PROVIDER - NSDCMRMEDTOKEN_GEN_ALL_CORE_FT
acetaminophen 325 mg oral tablet: 2 tab(s) orally every 4 hours, As needed, Mild Pain (1 - 3)  aspirin 81 mg oral delayed release tablet: 1 tab(s) orally once a day  calcitriol 0.25 mcg oral capsule: 1 cap(s) orally once a day  calcium carbonate 1250 mg (500 mg elemental calcium) oral tablet: 1 tab(s) orally 3 times a day  carvedilol 25 mg oral tablet: 1 tab(s) orally every 12 hours  Colace 100 mg oral capsule: 1 cap(s) orally 2 times a day, As Needed -for constipation   Entresto 49 mg-51 mg oral tablet: 1 tab(s) orally once a day  FeroSul 325 mg (65 mg elemental iron) oral tablet: 1 tab(s) orally once a day   HumaLOG 100 units/mL subcutaneous solution: 2 unit(s) subcutaneous 2 times a day (with meals)    per scale   insulin glargine 100 units/mL subcutaneous solution: 4 unit(s) subcutaneous once a day (at bedtime)    per fingerstick and as per home dose   multivitamin: 1 tab(s) orally once a day  pantoprazole 40 mg oral delayed release tablet: 1 tab(s) orally 2 times a day   senna oral tablet: 2 tab(s) orally once a day (at bedtime), As needed, Constipation acetaminophen 325 mg oral tablet: 2 tab(s) orally every 4 hours, As needed, Mild Pain (1 - 3)  aspirin 81 mg oral delayed release tablet: 1 tab(s) orally once a day  calcitriol 0.25 mcg oral capsule: 1 cap(s) orally once a day  calcium carbonate 1250 mg (500 mg elemental calcium) oral tablet: 1 tab(s) orally 3 times a day  carvedilol 25 mg oral tablet: 1 tab(s) orally every 12 hours  Colace 100 mg oral capsule: 1 cap(s) orally 2 times a day, As Needed -for constipation   Entresto 49 mg-51 mg oral tablet: 1 tab(s) orally once a day  FeroSul 325 mg (65 mg elemental iron) oral tablet: 1 tab(s) orally once a day   HumaLOG 100 units/mL subcutaneous solution: 2 unit(s) subcutaneous 2 times a day (with meals)    per scale   insulin glargine 100 units/mL subcutaneous solution: 4 unit(s) subcutaneous once a day (at bedtime)    per fingerstick and as per home dose   multivitamin: 1 tab(s) orally once a day  NIFEdipine 30 mg oral tablet, extended release: 1 tab(s) orally once a day  pantoprazole 40 mg oral delayed release tablet: 1 tab(s) orally 2 times a day   senna oral tablet: 2 tab(s) orally once a day (at bedtime), As needed, Constipation

## 2020-05-08 ENCOUNTER — TRANSCRIPTION ENCOUNTER (OUTPATIENT)
Age: 60
End: 2020-05-08

## 2020-05-08 LAB
GLUCOSE BLDC GLUCOMTR-MCNC: 121 MG/DL — HIGH (ref 70–99)
GLUCOSE BLDC GLUCOMTR-MCNC: 142 MG/DL — HIGH (ref 70–99)
GLUCOSE BLDC GLUCOMTR-MCNC: 205 MG/DL — HIGH (ref 70–99)

## 2020-05-08 PROCEDURE — 99238 HOSP IP/OBS DSCHRG MGMT 30/<: CPT

## 2020-05-08 RX ORDER — NIFEDIPINE 30 MG
1 TABLET, EXTENDED RELEASE 24 HR ORAL
Qty: 30 | Refills: 0
Start: 2020-05-08 | End: 2020-06-06

## 2020-05-08 RX ORDER — NIFEDIPINE 30 MG
30 TABLET, EXTENDED RELEASE 24 HR ORAL DAILY
Refills: 0 | Status: DISCONTINUED | OUTPATIENT
Start: 2020-05-08 | End: 2020-05-09

## 2020-05-08 RX ADMIN — SACUBITRIL AND VALSARTAN 1 TABLET(S): 24; 26 TABLET, FILM COATED ORAL at 12:30

## 2020-05-08 RX ADMIN — Medication 81 MILLIGRAM(S): at 12:23

## 2020-05-08 RX ADMIN — Medication 1 TABLET(S): at 12:30

## 2020-05-08 RX ADMIN — Medication 1 TABLET(S): at 21:10

## 2020-05-08 RX ADMIN — CALCITRIOL 0.25 MICROGRAM(S): 0.5 CAPSULE ORAL at 12:23

## 2020-05-08 RX ADMIN — Medication 2: at 21:10

## 2020-05-08 RX ADMIN — Medication 325 MILLIGRAM(S): at 12:29

## 2020-05-08 RX ADMIN — Medication 1: at 09:26

## 2020-05-08 NOTE — PROGRESS NOTE ADULT - SUBJECTIVE AND OBJECTIVE BOX
NEPHROLOGY INTERVAL HPI/OVERNIGHT EVENTS:    No new events.    MEDICATIONS  (STANDING):  aspirin enteric coated 81 milliGRAM(s) Oral daily  calcitriol   Capsule 0.25 MICROGram(s) Oral daily  calcium carbonate   1250 mG (OsCal) 1 Tablet(s) Oral three times a day  carvedilol 25 milliGRAM(s) Oral every 12 hours  dextrose 5%. 1000 milliLiter(s) (50 mL/Hr) IV Continuous <Continuous>  dextrose 50% Injectable 12.5 Gram(s) IV Push once  dextrose 50% Injectable 25 Gram(s) IV Push once  dextrose 50% Injectable 25 Gram(s) IV Push once  ferrous    sulfate 325 milliGRAM(s) Oral daily  insulin lispro (HumaLOG) corrective regimen sliding scale   SubCutaneous Before meals and at bedtime  Nephro-mariann 1 Tablet(s) Oral daily  pantoprazole    Tablet 40 milliGRAM(s) Oral before breakfast  sacubitril 49 mG/valsartan 51 mG 1 Tablet(s) Oral <User Schedule>    MEDICATIONS  (PRN):  acetaminophen    Suspension .. 975 milliGRAM(s) Oral every 6 hours PRN Moderate Pain (4 - 6)  acetaminophen   Tablet .. 650 milliGRAM(s) Oral every 4 hours PRN Mild Pain (1 - 3)  benzocaine 15 mG/menthol 3.6 mG (Sugar-Free) Lozenge 1 Lozenge Oral every 4 hours PRN Sore Throat  dextrose 40% Gel 15 Gram(s) Oral once PRN Blood Glucose LESS THAN 70 milliGRAM(s)/deciliter  glucagon  Injectable 1 milliGRAM(s) IntraMuscular once PRN Glucose LESS THAN 70 milligrams/deciliter  metoclopramide Injectable 5 milliGRAM(s) IV Push every 6 hours PRN Nausea and/or Vomiting  senna 2 Tablet(s) Oral at bedtime PRN Constipation  traMADol 75 milliGRAM(s) Oral every 6 hours PRN Severe Pain (7 - 10)      Allergies    eggs (Anaphylaxis)  No Known Drug Allergies      Vital Signs Last 24 Hrs  T(C): 36.8 (08 May 2020 07:53), Max: 36.9 (07 May 2020 23:05)  T(F): 98.3 (08 May 2020 07:53), Max: 98.5 (07 May 2020 23:05)  HR: 64 (08 May 2020 07:53) (64 - 68)  BP: 158/75 (08 May 2020 07:53) (153/70 - 158/75)  BP(mean): --  RR: 18 (08 May 2020 07:53) (18 - 20)  SpO2: 99% (08 May 2020 07:53) (95% - 99%)  T(C): 36.7 (07 May 2020 08:06), Max: 36.8 (06 May 2020 15:02)  T(F): 98 (07 May 2020 08:06), Max: 98.3 (06 May 2020 15:02)  HR: 68 (07 May 2020 08:06) (68 - 81)  BP: 142/80 (07 May 2020 08:06) (133/64 - 194/90)  BP(mean): --        PHYSICAL EXAM:      NECK: Supple, +JVD  NERVOUS SYSTEM:  Alert & Oriented in bed  CHEST/LUNG: No 02, no wheezes noted  HEART: Regular rate and rhythm; No rub  ABDOMEN: Not tender  EXTREMITIES: decreased edema , Access w/ thrill     LABS:                        9.6    5.00  )-----------( 206      ( 07 May 2020 07:37 )             31.1     05-07    136  |  96<L>  |  17.0  ----------------------------<  125<H>  4.4   |  32.0<H>  |  3.56<H>    Ca    8.6      07 May 2020 07:37  Phos  2.7     05-07    TPro  7.8  /  Alb  3.6  /  TBili  0.7  /  DBili  x   /  AST  18  /  ALT  7   /  AlkPhos  218<H>  05-05    PT/INR - ( 05 May 2020 14:41 )   PT: 10.8 sec;   INR: 0.96 ratio         PTT - ( 05 May 2020 14:41 )  PTT:33.0 sec    Phosphorus Level, Serum: 2.7 mg/dL (05-07 @ 07:37)          RADIOLOGY & ADDITIONAL TESTS:

## 2020-05-08 NOTE — DISCHARGE NOTE NURSING/CASE MANAGEMENT/SOCIAL WORK - NSDCFUADDAPPT_GEN_ALL_CORE_FT
It has been determined that you no longer need hospitalization and can recover while remaining in self-quarantine at home for 14 days. You should follow the prevention steps below until a healthcare provider or [facility] says you can return to your normal activities.  Please remain in quarantine until then. You should restrict activities outside your home except for getting medical care.  Do not go to work, school or public areas.  Avoid using public transportation.  Separate yourself from other people and animals in your home.  Cover your coughs and sneezes.  Clean your hands often. Avoid sharing personal household items. Clean all high touch surfaces. Monitor your symptoms, if you have a medical emergency call 911.

## 2020-05-08 NOTE — PROGRESS NOTE ADULT - SUBJECTIVE AND OBJECTIVE BOX
Patient is a 60y old  Female who presents with a chief complaint of ascites (08 May 2020 13:24)        PAST MEDICAL & SURGICAL HISTORY:  End stage renal disease  Diabetes  Coronary artery disease, angina presence unspecified, unspecified vessel or lesion type, unspecified whether native or transplanted heart  Paroxysmal atrial fibrillation  Anemia due to chronic kidney disease, unspecified CKD stage  Chronic systolic congestive heart failure  Pleural effusion  Pericardial effusion  End stage renal disease on dialysis  HLD (hyperlipidemia)  HTN (hypertension)  DM (diabetes mellitus)  A-V fistula  A-V fistula  Encounter for dialysis catheter care    MEDICATIONS  (STANDING):  aspirin enteric coated 81 milliGRAM(s) Oral daily  calcitriol   Capsule 0.25 MICROGram(s) Oral daily  calcium carbonate   1250 mG (OsCal) 1 Tablet(s) Oral three times a day  carvedilol 25 milliGRAM(s) Oral every 12 hours  dextrose 5%. 1000 milliLiter(s) (50 mL/Hr) IV Continuous <Continuous>  dextrose 50% Injectable 12.5 Gram(s) IV Push once  dextrose 50% Injectable 25 Gram(s) IV Push once  dextrose 50% Injectable 25 Gram(s) IV Push once  ferrous    sulfate 325 milliGRAM(s) Oral daily  insulin lispro (HumaLOG) corrective regimen sliding scale   SubCutaneous Before meals and at bedtime  Nephro-mariann 1 Tablet(s) Oral daily  NIFEdipine XL 30 milliGRAM(s) Oral daily  pantoprazole    Tablet 40 milliGRAM(s) Oral before breakfast  sacubitril 49 mG/valsartan 51 mG 1 Tablet(s) Oral <User Schedule>    MEDICATIONS  (PRN):  acetaminophen    Suspension .. 975 milliGRAM(s) Oral every 6 hours PRN Moderate Pain (4 - 6)  acetaminophen   Tablet .. 650 milliGRAM(s) Oral every 4 hours PRN Mild Pain (1 - 3)  benzocaine 15 mG/menthol 3.6 mG (Sugar-Free) Lozenge 1 Lozenge Oral every 4 hours PRN Sore Throat  dextrose 40% Gel 15 Gram(s) Oral once PRN Blood Glucose LESS THAN 70 milliGRAM(s)/deciliter  glucagon  Injectable 1 milliGRAM(s) IntraMuscular once PRN Glucose LESS THAN 70 milligrams/deciliter  metoclopramide Injectable 5 milliGRAM(s) IV Push every 6 hours PRN Nausea and/or Vomiting  senna 2 Tablet(s) Oral at bedtime PRN Constipation  traMADol 75 milliGRAM(s) Oral every 6 hours PRN Severe Pain (7 - 10)      Vital Signs Last 24 Hrs  T(C): 36.8 (08 May 2020 07:53), Max: 36.9 (07 May 2020 23:05)  T(F): 98.3 (08 May 2020 07:53), Max: 98.5 (07 May 2020 23:05)  HR: 64 (08 May 2020 07:53) (64 - 68)  BP: 158/75 (08 May 2020 07:53) (153/70 - 158/75)  BP(mean): --  RR: 18 (08 May 2020 07:53) (18 - 20)  SpO2: 99% (08 May 2020 07:53) (95% - 99%)    PHYSICAL EXAM:      *PE deferred to reduce infectious disease         ECG: SR + LVH + limb leads switched       I&O's Detail      LABS:                        9.6    5.00  )-----------( 206      ( 07 May 2020 07:37 )             31.1     05-07    136  |  96<L>  |  17.0  ----------------------------<  125<H>  4.4   |  32.0<H>  |  3.56<H>    Ca    8.6      07 May 2020 07:37  Phos  2.7     05-07              BNP  I&O's Detail    Daily     Daily     RADIOLOGY & ADDITIONAL STUDIES:

## 2020-05-08 NOTE — PROGRESS NOTE ADULT - ASSESSMENT
60yoF w/ pmh ESRD on HD M/W/F, DM II, Parox AF not on AC due to GI bleed, hx chronic combined HF (ef 35-40% w/ [id number]) resolved on repeat echo, nonobstructive CAD, hx pericarditis s/p pericardiocentesis, recurrent ascites s/p multiple paracentesis presenting w/ recurrent abd pain, admitted for acute on chronic ascites.  S/P HD and RLQ IR guided paracentesis on 5/6/2020 - medically stable for discharge.    #acute on chronic ascites - hx mult paracentesis in past, last done 01/2020. no hx cirrhosis. prior hx chronic combined HF (ef 35-40% w/ [id number]) resolved on repeat echo 12/2019. suspect ascites as function of volume overload from esrd. Therapeutic paracentesis 5/6, 9.5L removed, Cardio consulted- continue enetresto, increased Coreg    #elevated troponin, asymptomatic - Patient w/ chronically elevated trop usually between 0.5-0.9 @ baseline given esrd. chronic and intermittent CP/abdominal pain unchanged per patient.  -Seen by cardiology team. ECG TWI's appear more consistent with LVH than ischemia; trops minimal in renal failure. No acute chest pain or complaints. Repeat EKG 5/6 shows NSR HR 74bpm, no acute changes from previous. Continue ASA, Statin, BB    #esrd on HD (MWF) - s/p HD, d/w with [facility] to arrange to resume outpt HD - Patient care to be transferred to COVID unit starting 5/11    #dm2 on long term insulin therapy complicated by asymptomatic hyperglycemia, stable.   - a1c 6.5. iss + fs achs. Trending Accuchecks. Continue to hold home Lantus given due to poor appetite in setting of ascites.     #HTN  -Cardiology following, carvedilol added  -continue to trend    dvt ppx. scd for now. no pharm ppx in anticipated of paracentesis    dispo. possible d/c later today D/W SW/[facility] - need to resume HD as outpt/ home aides    outpt fu. pmd. nephro. cardio.

## 2020-05-08 NOTE — PROGRESS NOTE ADULT - ASSESSMENT
58 yo F w/ h/o CHF (previous systolic dysfunction with recovery LVEF in 09/2019), ESRD (M/W/F), h/o pericardial effusion, normal coronaries on [facility] 03/23/18, Lambl's excrescence, PAF, DM, HTN who p/w COVID-19+.     1. PAF (not on anticoagulation secondary to h/o GIB)  2. ESRD  3. HTN  4. CHF (recovery of LV function)  5. COVID-19+    *05/04- ECG TWI's appear more consistent with LVH than ischemia; trops minimal in renal failure       PLAN:    1. Continue with coreg, entresto for CHF  2. Supportive care

## 2020-05-08 NOTE — CHART NOTE - NSCHARTNOTEFT_GEN_A_CORE
Palliative  Care NP NOte:  Patient awake alert VSS, breathing even and non-labored-not coughing. Medically stable for discharge. ESRD on HD. Will have HD treatment today before discharge.  Arrangements made to continue HD in [facility].  Palliative signing off case, please reconsult if goals of care or her condition changes Chery Clark NP

## 2020-05-08 NOTE — DISCHARGE NOTE NURSING/CASE MANAGEMENT/SOCIAL WORK - PATIENT PORTAL LINK FT
You can access the FollowMyHealth Patient Portal offered by [facility] by registering at the following website: [email or web address]. By joining [facility]’s FollowMyHealth portal, you will also be able to view your health information using other applications (apps) compatible with our system.

## 2020-05-08 NOTE — PROGRESS NOTE ADULT - SUBJECTIVE AND OBJECTIVE BOX
HOSPITALIST PROGRESS NOTE    JAROCHO MORALES  [id number]  60yFemale    Patient is a 60y old  Female who presents with a chief complaint of ascites (08 May 2020 10:04)      SUBJECTIVE:   Chart reviewed since last visit.  Patient seen and examined at bedside for ascites, ESRD  Denies any abdominal pain, dyspnea, fever.  Continues to have episode chest 'colic' pain      OBJECTIVE:  Vital Signs Last 24 Hrs  T(C): 36.8 (08 May 2020 07:53), Max: 36.9 (07 May 2020 23:05)  T(F): 98.3 (08 May 2020 07:53), Max: 98.5 (07 May 2020 23:05)  HR: 64 (08 May 2020 07:53) (64 - 68)  BP: 158/75 (08 May 2020 07:53) (153/70 - 158/75)   RR: 18 (08 May 2020 07:53) (18 - 20)  SpO2: 99% (08 May 2020 07:53) (95% - 99%)    PHYSICAL EXAM:  GENERAL: Pt lying in bed comfortably in NAD  HEAD:  Atraumatic   EYES: EOMI, PERRL, conjunctiva and sclera clear  ENT: Moist mucous membranes  NECK: Supple, No JVD  CHEST/LUNG: Clear to auscultation bilaterally; No rales, rhonchi, wheezing, or rubs. Unlabored respirations  HEART: Regular rate and rhythm; No murmurs, rubs, or gallops  ABDOMEN: NT, ND, soft, +BS  EXTREMITIES:  2+ Peripheral Pulses, brisk capillary refill. No clubbing, cyanosis. no edema  NERVOUS SYSTEM:  Alert & Oriented X3, speech clear but slow to respond FROM x 4 extremities. No deficits   SKIN: No rashes or lesions      MONITOR:  CAPILLARY BLOOD GLUCOSE      POCT Blood Glucose.: 142 mg/dL (08 May 2020 11:58)  POCT Blood Glucose.: 178 mg/dL (07 May 2020 20:45)  POCT Blood Glucose.: 128 mg/dL (07 May 2020 16:49)        I&O's Summary                          9.6    5.00  )-----------( 206      ( 07 May 2020 07:37 )             31.1       05-07    136  |  96<L>  |  17.0  ----------------------------<  125<H>  4.4   |  32.0<H>  |  3.56<H>    Ca    8.6      07 May 2020 07:37  Phos  2.7     05-07              Culture:    TTE:    RADIOLOGY        MEDICATIONS  (STANDING):  aspirin enteric coated 81 milliGRAM(s) Oral daily  calcitriol   Capsule 0.25 MICROGram(s) Oral daily  calcium carbonate   1250 mG (OsCal) 1 Tablet(s) Oral three times a day  carvedilol 25 milliGRAM(s) Oral every 12 hours  dextrose 5%. 1000 milliLiter(s) (50 mL/Hr) IV Continuous <Continuous>  dextrose 50% Injectable 12.5 Gram(s) IV Push once  dextrose 50% Injectable 25 Gram(s) IV Push once  dextrose 50% Injectable 25 Gram(s) IV Push once  ferrous    sulfate 325 milliGRAM(s) Oral daily  insulin lispro (HumaLOG) corrective regimen sliding scale   SubCutaneous Before meals and at bedtime  Nephro-mariann 1 Tablet(s) Oral daily  NIFEdipine XL 30 milliGRAM(s) Oral daily  pantoprazole    Tablet 40 milliGRAM(s) Oral before breakfast  sacubitril 49 mG/valsartan 51 mG 1 Tablet(s) Oral <User Schedule>      MEDICATIONS  (PRN):  acetaminophen    Suspension .. 975 milliGRAM(s) Oral every 6 hours PRN Moderate Pain (4 - 6)  acetaminophen   Tablet .. 650 milliGRAM(s) Oral every 4 hours PRN Mild Pain (1 - 3)  benzocaine 15 mG/menthol 3.6 mG (Sugar-Free) Lozenge 1 Lozenge Oral every 4 hours PRN Sore Throat  dextrose 40% Gel 15 Gram(s) Oral once PRN Blood Glucose LESS THAN 70 milliGRAM(s)/deciliter  glucagon  Injectable 1 milliGRAM(s) IntraMuscular once PRN Glucose LESS THAN 70 milligrams/deciliter  metoclopramide Injectable 5 milliGRAM(s) IV Push every 6 hours PRN Nausea and/or Vomiting  senna 2 Tablet(s) Oral at bedtime PRN Constipation  traMADol 75 milliGRAM(s) Oral every 6 hours PRN Severe Pain (7 - 10)

## 2020-05-08 NOTE — DISCHARGE NOTE NURSING/CASE MANAGEMENT/SOCIAL WORK - NSDCCRTYPESERV_GEN_ALL_CORE_FT
Dialysis will resume on Monday 5/11 at the [facility]  Address: [address] [place] [address]  Phone: [phone]  KAVEH Edmond,ADELITA  Please reach out to staff at the [facility] location if you have questions at phone number [phone]. Dialysis will resume on Monday 5/11 at the [facility]  Address: [address], [place] [address]  Phone: [phone]  KAVEH Edmond F  Please reach out to staff at the [facility] location if you have questions at phone number [phone].

## 2020-05-09 VITALS
DIASTOLIC BLOOD PRESSURE: 70 MMHG | TEMPERATURE: 99 F | HEART RATE: 70 BPM | OXYGEN SATURATION: 97 % | SYSTOLIC BLOOD PRESSURE: 148 MMHG | RESPIRATION RATE: 18 BRPM

## 2020-05-09 LAB — GLUCOSE BLDC GLUCOMTR-MCNC: 180 MG/DL — HIGH (ref 70–99)

## 2020-05-09 PROCEDURE — 99232 SBSQ HOSP IP/OBS MODERATE 35: CPT | Mod: CS

## 2020-05-09 RX ORDER — NIFEDIPINE 30 MG
60 TABLET, EXTENDED RELEASE 24 HR ORAL DAILY
Refills: 0 | Status: DISCONTINUED | OUTPATIENT
Start: 2020-05-09 | End: 2020-05-09

## 2020-05-09 RX ORDER — NIFEDIPINE 30 MG
1 TABLET, EXTENDED RELEASE 24 HR ORAL
Qty: 15 | Refills: 0
Start: 2020-05-09 | End: 2020-05-23

## 2020-05-09 RX ADMIN — Medication 1: at 08:25

## 2020-05-09 RX ADMIN — Medication 1 TABLET(S): at 04:32

## 2020-05-09 RX ADMIN — Medication 1 TABLET(S): at 12:34

## 2020-05-09 RX ADMIN — SACUBITRIL AND VALSARTAN 1 TABLET(S): 24; 26 TABLET, FILM COATED ORAL at 04:33

## 2020-05-09 RX ADMIN — PANTOPRAZOLE SODIUM 40 MILLIGRAM(S): 20 TABLET, DELAYED RELEASE ORAL at 04:32

## 2020-05-09 RX ADMIN — CARVEDILOL PHOSPHATE 25 MILLIGRAM(S): 80 CAPSULE, EXTENDED RELEASE ORAL at 04:32

## 2020-05-09 RX ADMIN — CALCITRIOL 0.25 MICROGRAM(S): 0.5 CAPSULE ORAL at 12:34

## 2020-05-09 RX ADMIN — Medication 30 MILLIGRAM(S): at 04:32

## 2020-05-09 RX ADMIN — Medication 81 MILLIGRAM(S): at 12:34

## 2020-05-09 RX ADMIN — Medication 325 MILLIGRAM(S): at 12:35

## 2020-05-09 RX ADMIN — Medication 3: at 12:35

## 2020-05-09 RX ADMIN — Medication 1 TABLET(S): at 12:35

## 2020-05-09 NOTE — PROGRESS NOTE ADULT - ASSESSMENT
60yoF w/ pmh ESRD on HD M/W/F, DM II, Parox AF not on AC due to GI bleed, hx chronic combined HF (ef 35-40% w/ [id number]) resolved on repeat echo, nonobstructive CAD, hx pericarditis s/p pericardiocentesis, recurrent ascites s/p multiple paracentesis presenting w/ recurrent abd pain, admitted for acute on chronic ascites.  S/P HD and RLQ IR guided paracentesis on 5/6/2020    #acute on chronic ascites - hx mult paracentesis in past, last done 01/2020. no hx cirrhosis. prior hx chronic combined HF (ef 35-40% w/ [id number]) resolved on repeat echo 12/2019. suspect ascites as function of volume overload from esrd. Therapeutic paracentesis 5/6, 9.5L removed, Cardio consulted- continue enetresto, increased Coreg    #elevated troponin  - asymptomatic   - chroncically elevated usually between 0.5-0.9 baseline given esrd  - cardio consult appreciated- 05/04- ECG TWI's appear more consistent with LVH than ischemia; trops minimal in renal failure  - asa   - statin   - coreg    #covid 19  - isolastion precuations  - monitor vitals     #hx of diastolic chf    - recovery of EF per echo 12/2019-> previously diastolic dysfunction  - coreg   - entresto  - cardio consult appreciated     #esrd on HD  - nephro consult appreciated  - HD per nephro    #Type2 diabetes   - a1c 6.5 on admit   - iss   - monitor fingersticks    #HTN  - monitor blood pressure  - cardio and nephro consult appreciated  - coreg, nifedipine, entresto    #DVT prophylaxis  - venodynes     Time spent on patients discharge 32 minutes

## 2020-05-09 NOTE — PROGRESS NOTE ADULT - SUBJECTIVE AND OBJECTIVE BOX
NEPHROLOGY INTERVAL HPI/OVERNIGHT EVENTS:    No new events.    MEDICATIONS  (STANDING):  aspirin enteric coated 81 milliGRAM(s) Oral daily  calcitriol   Capsule 0.25 MICROGram(s) Oral daily  calcium carbonate   1250 mG (OsCal) 1 Tablet(s) Oral three times a day  carvedilol 25 milliGRAM(s) Oral every 12 hours  dextrose 5%. 1000 milliLiter(s) (50 mL/Hr) IV Continuous <Continuous>  dextrose 50% Injectable 12.5 Gram(s) IV Push once  dextrose 50% Injectable 25 Gram(s) IV Push once  dextrose 50% Injectable 25 Gram(s) IV Push once  ferrous    sulfate 325 milliGRAM(s) Oral daily  insulin lispro (HumaLOG) corrective regimen sliding scale   SubCutaneous Before meals and at bedtime  Nephro-mariann 1 Tablet(s) Oral daily  pantoprazole    Tablet 40 milliGRAM(s) Oral before breakfast  sacubitril 49 mG/valsartan 51 mG 1 Tablet(s) Oral <User Schedule>    MEDICATIONS  (PRN):  acetaminophen    Suspension .. 975 milliGRAM(s) Oral every 6 hours PRN Moderate Pain (4 - 6)  acetaminophen   Tablet .. 650 milliGRAM(s) Oral every 4 hours PRN Mild Pain (1 - 3)  benzocaine 15 mG/menthol 3.6 mG (Sugar-Free) Lozenge 1 Lozenge Oral every 4 hours PRN Sore Throat  dextrose 40% Gel 15 Gram(s) Oral once PRN Blood Glucose LESS THAN 70 milliGRAM(s)/deciliter  glucagon  Injectable 1 milliGRAM(s) IntraMuscular once PRN Glucose LESS THAN 70 milligrams/deciliter  metoclopramide Injectable 5 milliGRAM(s) IV Push every 6 hours PRN Nausea and/or Vomiting  senna 2 Tablet(s) Oral at bedtime PRN Constipation  traMADol 75 milliGRAM(s) Oral every 6 hours PRN Severe Pain (7 - 10)      Allergies    eggs (Anaphylaxis)  No Known Drug Allergies      Vital Signs Last 24 Hrs  T(C): 36.9 (09 May 2020 00:35), Max: 36.9 (08 May 2020 16:29)  T(F): 98.5 (09 May 2020 00:35), Max: 98.5 (09 May 2020 00:35)  HR: 68 (09 May 2020 04:12) (62 - 69)  BP: 146/72 (09 May 2020 04:12) (140/76 - 171/83)        PHYSICAL EXAM:      NECK: Supple, +JVD  NERVOUS SYSTEM:  Alert & Oriented in bed  CHEST/LUNG: No 02, no wheezes noted  HEART: Regular rate and rhythm; No rub  ABDOMEN: Not tender, + BS  EXTREMITIES: decreased edema , Access w/ thrill     LABS:                        9.6    5.00  )-----------( 206      ( 07 May 2020 07:37 )             31.1     05-07    136  |  96<L>  |  17.0  ----------------------------<  125<H>  4.4   |  32.0<H>  |  3.56<H>    Ca    8.6      07 May 2020 07:37  Phos  2.7     05-07    TPro  7.8  /  Alb  3.6  /  TBili  0.7  /  DBili  x   /  AST  18  /  ALT  7   /  AlkPhos  218<H>  05-05    PT/INR - ( 05 May 2020 14:41 )   PT: 10.8 sec;   INR: 0.96 ratio         PTT - ( 05 May 2020 14:41 )  PTT:33.0 sec    Phosphorus Level, Serum: 2.7 mg/dL (05-07 @ 07:37)          RADIOLOGY & ADDITIONAL TESTS:

## 2020-05-09 NOTE — PROGRESS NOTE ADULT - SUBJECTIVE AND OBJECTIVE BOX
Patient is a 60y old  Female who presents with a chief complaint of ascites (09 May 2020 09:10)    Patient seen and examined at bedside. called and spoke to salumed increase nifedipine from 30 to 60 will cancel old rx. stable for d/c today     ALLERGIES:  eggs (Anaphylaxis)  No Known Drug Allergies    MEDICATIONS  (STANDING):  aspirin enteric coated 81 milliGRAM(s) Oral daily  calcitriol   Capsule 0.25 MICROGram(s) Oral daily  calcium carbonate   1250 mG (OsCal) 1 Tablet(s) Oral three times a day  carvedilol 25 milliGRAM(s) Oral every 12 hours  dextrose 5%. 1000 milliLiter(s) (50 mL/Hr) IV Continuous <Continuous>  dextrose 50% Injectable 12.5 Gram(s) IV Push once  dextrose 50% Injectable 25 Gram(s) IV Push once  dextrose 50% Injectable 25 Gram(s) IV Push once  ferrous    sulfate 325 milliGRAM(s) Oral daily  insulin lispro (HumaLOG) corrective regimen sliding scale   SubCutaneous Before meals and at bedtime  Nephro-mariann 1 Tablet(s) Oral daily  NIFEdipine XL 60 milliGRAM(s) Oral daily  pantoprazole    Tablet 40 milliGRAM(s) Oral before breakfast  sacubitril 49 mG/valsartan 51 mG 1 Tablet(s) Oral <User Schedule>    MEDICATIONS  (PRN):  acetaminophen    Suspension .. 975 milliGRAM(s) Oral every 6 hours PRN Moderate Pain (4 - 6)  acetaminophen   Tablet .. 650 milliGRAM(s) Oral every 4 hours PRN Mild Pain (1 - 3)  benzocaine 15 mG/menthol 3.6 mG (Sugar-Free) Lozenge 1 Lozenge Oral every 4 hours PRN Sore Throat  dextrose 40% Gel 15 Gram(s) Oral once PRN Blood Glucose LESS THAN 70 milliGRAM(s)/deciliter  glucagon  Injectable 1 milliGRAM(s) IntraMuscular once PRN Glucose LESS THAN 70 milligrams/deciliter  metoclopramide Injectable 5 milliGRAM(s) IV Push every 6 hours PRN Nausea and/or Vomiting  senna 2 Tablet(s) Oral at bedtime PRN Constipation  traMADol 75 milliGRAM(s) Oral every 6 hours PRN Severe Pain (7 - 10)    Vital Signs Last 24 Hrs  T(F): 98.5 (09 May 2020 00:35), Max: 98.5 (09 May 2020 00:35)  HR: 68 (09 May 2020 04:12) (62 - 69)  BP: 146/72 (09 May 2020 04:12) (140/76 - 171/83)  RR: 18 (09 May 2020 00:35) (18 - 20)  SpO2: 97% (09 May 2020 00:35) (97% - 100%)  I&O's Summary    08 May 2020 07:01  -  09 May 2020 07:00  --------------------------------------------------------  IN: 0 mL / OUT: 1600 mL / NET: -1600 mL      PHYSICAL EXAM:  General: NAD, Alert  ENT: MMM, no thrush  Neck: Supple, No JVD  Lungs: Clear to auscultation bilaterally, good air entry, non-labored breathing  Cardio: +S1/S2  Abdomen: Soft, Nontender, Nondistended; Bowel sounds present  Extremities: No calf tenderness    LABS:                        9.6    5.00  )-----------( 206      ( 07 May 2020 07:37 )             31.1     05-07    136  |  96  |  17.0  ----------------------------<  125  4.4   |  32.0  |  3.56    Ca    8.6      07 May 2020 07:37  Phos  2.7     05-07    eGFR if Non African American: 13 mL/min/1.73M2 (05-07-20 @ 07:37)  eGFR if African American: 15 mL/min/1.73M2 (05-07-20 @ 07:37)    Glucose  POCT Blood Glucose.: 180 mg/dL (09 May 2020 07:36)  POCT Blood Glucose.: 205 mg/dL (08 May 2020 21:02)  POCT Blood Glucose.: 121 mg/dL (08 May 2020 17:28)    RADIOLOGY & ADDITIONAL TESTS:  - no new tests

## 2020-05-09 NOTE — PROGRESS NOTE ADULT - ATTENDING COMMENTS
HD today, then discharge.
Nifedipine increased. Follow as op.
Patient seen, examined with KRIS Maldonado  used ID# [id number].  9500ml removed yesterday.    Significant improvement in symptoms after fluid removal.    Chronic chest 'colic' pain    Discharge home. HD at primary site [facility]
ESRD with ascites - recurrent. For paracentesis today followed by HD.  Palliative care evalaution

## 2020-06-02 ENCOUNTER — OUTPATIENT (OUTPATIENT)
Dept: OUTPATIENT SERVICES | Facility: HOSPITAL | Age: 60
LOS: 1 days | End: 2020-06-02

## 2020-06-02 ENCOUNTER — APPOINTMENT (OUTPATIENT)
Dept: INTERVENTIONAL RADIOLOGY/VASCULAR | Facility: CLINIC | Age: 60
End: 2020-06-02
Payer: MEDICARE

## 2020-06-02 DIAGNOSIS — I77.0 ARTERIOVENOUS FISTULA, ACQUIRED: Chronic | ICD-10-CM

## 2020-06-02 DIAGNOSIS — Z00.8 ENCOUNTER FOR OTHER GENERAL EXAMINATION: ICD-10-CM

## 2020-06-02 DIAGNOSIS — Z49.01 ENCOUNTER FOR FITTING AND ADJUSTMENT OF EXTRACORPOREAL DIALYSIS CATHETER: Chronic | ICD-10-CM

## 2020-06-02 PROCEDURE — 84145 PROCALCITONIN (PCT): CPT

## 2020-06-02 PROCEDURE — 84484 ASSAY OF TROPONIN QUANT: CPT

## 2020-06-02 PROCEDURE — 80053 COMPREHEN METABOLIC PANEL: CPT

## 2020-06-02 PROCEDURE — 97116 GAIT TRAINING THERAPY: CPT

## 2020-06-02 PROCEDURE — 76942 ECHO GUIDE FOR BIOPSY: CPT

## 2020-06-02 PROCEDURE — 83605 ASSAY OF LACTIC ACID: CPT

## 2020-06-02 PROCEDURE — 36415 COLL VENOUS BLD VENIPUNCTURE: CPT

## 2020-06-02 PROCEDURE — 99261: CPT

## 2020-06-02 PROCEDURE — 83880 ASSAY OF NATRIURETIC PEPTIDE: CPT

## 2020-06-02 PROCEDURE — 82550 ASSAY OF CK (CPK): CPT

## 2020-06-02 PROCEDURE — 82803 BLOOD GASES ANY COMBINATION: CPT

## 2020-06-02 PROCEDURE — 80048 BASIC METABOLIC PNL TOTAL CA: CPT

## 2020-06-02 PROCEDURE — 86140 C-REACTIVE PROTEIN: CPT

## 2020-06-02 PROCEDURE — 84100 ASSAY OF PHOSPHORUS: CPT

## 2020-06-02 PROCEDURE — 85384 FIBRINOGEN ACTIVITY: CPT

## 2020-06-02 PROCEDURE — 96375 TX/PRO/DX INJ NEW DRUG ADDON: CPT

## 2020-06-02 PROCEDURE — 99285 EMERGENCY DEPT VISIT HI MDM: CPT | Mod: 25

## 2020-06-02 PROCEDURE — 71045 X-RAY EXAM CHEST 1 VIEW: CPT

## 2020-06-02 PROCEDURE — 82962 GLUCOSE BLOOD TEST: CPT

## 2020-06-02 PROCEDURE — 83036 HEMOGLOBIN GLYCOSYLATED A1C: CPT

## 2020-06-02 PROCEDURE — 49083 ABD PARACENTESIS W/IMAGING: CPT

## 2020-06-02 PROCEDURE — 83615 LACTATE (LD) (LDH) ENZYME: CPT

## 2020-06-02 PROCEDURE — 96374 THER/PROPH/DIAG INJ IV PUSH: CPT

## 2020-06-02 PROCEDURE — 93005 ELECTROCARDIOGRAM TRACING: CPT

## 2020-06-02 PROCEDURE — 87635 SARS-COV-2 COVID-19 AMP PRB: CPT

## 2020-06-02 PROCEDURE — 85027 COMPLETE CBC AUTOMATED: CPT

## 2020-06-02 PROCEDURE — 97163 PT EVAL HIGH COMPLEX 45 MIN: CPT

## 2020-06-02 PROCEDURE — 76705 ECHO EXAM OF ABDOMEN: CPT

## 2020-06-02 PROCEDURE — 82140 ASSAY OF AMMONIA: CPT

## 2020-06-02 PROCEDURE — 82728 ASSAY OF FERRITIN: CPT

## 2020-06-02 PROCEDURE — 97530 THERAPEUTIC ACTIVITIES: CPT

## 2020-06-02 PROCEDURE — 85379 FIBRIN DEGRADATION QUANT: CPT

## 2020-06-02 PROCEDURE — 85610 PROTHROMBIN TIME: CPT

## 2020-06-02 PROCEDURE — 85730 THROMBOPLASTIN TIME PARTIAL: CPT

## 2020-06-02 PROCEDURE — T1013: CPT

## 2020-06-16 NOTE — ED STATDOCS - ATTENDING CONTRIBUTION TO CARE
0 I, Dr. Bocanegra, performed the initial face to face bedside interview with this patient regarding history of present illness, review of symptoms and relevant past medical, social and family history.  I completed an independent physical examination.  I was the initial provider who evaluated this patient. I have signed out the follow up of any pending tests (i.e. labs, radiological studies) to the ACP.  I have communicated the patient’s plan of care and disposition with the ACP.

## 2020-06-16 NOTE — PROGRESS NOTE ADULT - SUBJECTIVE AND OBJECTIVE BOX
I have reviewed discharge instructions with the patient. The patient verbalized understanding. Pt in no distress at time of discharge and agreeable to terms of discharge. NEPHROLOGY INTERVAL HPI/OVERNIGHT EVENTS:  pt comfortable when seen earlier  seated in chair  no cp, sob, n/v/d    MEDICATIONS  (STANDING):  artificial  tears Solution 1 Drop(s) Both EYES every 12 hours  aspirin enteric coated 81 milliGRAM(s) Oral daily  calcium acetate 667 milliGRAM(s) Oral three times a day with meals  carvedilol 12.5 milliGRAM(s) Oral every 12 hours  cloNIDine 0.1 milliGRAM(s) Oral two times a day  colchicine 0.6 milliGRAM(s) Oral two times a day  dextrose 5%. 1000 milliLiter(s) (50 mL/Hr) IV Continuous <Continuous>  dextrose 50% Injectable 12.5 Gram(s) IV Push once  dextrose 50% Injectable 25 Gram(s) IV Push once  dextrose 50% Injectable 25 Gram(s) IV Push once  docusate sodium 100 milliGRAM(s) Oral two times a day  epoetin gian Injectable 02204 Unit(s) IV Push <User Schedule>  ergocalciferol 90730 Unit(s) Oral every week  furosemide    Tablet 20 milliGRAM(s) Oral daily  hydrALAZINE 25 milliGRAM(s) Oral every 8 hours  insulin lispro (HumaLOG) corrective regimen sliding scale   SubCutaneous three times a day before meals  insulin lispro (HumaLOG) corrective regimen sliding scale   SubCutaneous at bedtime  metoclopramide Injectable 10 milliGRAM(s) IV Push every 12 hours  pantoprazole    Tablet 40 milliGRAM(s) Oral two times a day  polyethylene glycol 3350 17 Gram(s) Oral daily  PPD  5 Tuberculin Unit(s) Injectable 5 Unit(s) IntraDermal once  senna 2 Tablet(s) Oral at bedtime  simvastatin 40 milliGRAM(s) Oral at bedtime    MEDICATIONS  (PRN):  acetaminophen   Tablet 650 milliGRAM(s) Oral every 6 hours PRN For Temp greater than 38 C (100.4 F)  acetaminophen   Tablet. 650 milliGRAM(s) Oral every 6 hours PRN Moderate Pain (4 - 6)  dextrose Gel 1 Dose(s) Oral once PRN Blood Glucose LESS THAN 70 milliGRAM(s)/deciliter  glucagon  Injectable 1 milliGRAM(s) IntraMuscular once PRN Glucose LESS THAN 70 milligrams/deciliter  hydrALAZINE Injectable 20 milliGRAM(s) IV Push every 6 hours PRN SBP > 160  ondansetron Injectable 4 milliGRAM(s) IV Push every 6 hours PRN Nausea      Allergies    No Known Allergies    Intolerances                Vital Signs Last 24 Hrs  T(C): 36.7 (22 Mar 2018 05:20), Max: 36.8 (21 Mar 2018 21:01)  T(F): 98 (22 Mar 2018 05:20), Max: 98.2 (21 Mar 2018 21:01)  HR: 79 (22 Mar 2018 05:20) (66 - 79)  BP: 194/94 (22 Mar 2018 05:20) (141/72 - 194/94)  BP(mean): --  RR: 16 (22 Mar 2018 05:20) (16 - 18)  SpO2: 99% (22 Mar 2018 05:20) (92% - 100%)    PHYSICAL EXAM:  GENERAL: Weakness  HEAD:  Periorbital edema  EYES: EOMI, PERRLA  NECK: Supple, No JVD  NERVOUS SYSTEM:  Alert & Oriented X3,  intact and symmetric  CHEST/LUNG: Diminished BS bilaterally (L > R)   HEART: Regular rate and rhythm; No rub  ABDOMEN: Soft, +BS  EXTREMITIES:  2+ Peripheral Pulses, tr edema; R femoral catheter  SKIN: No rashes or lesions      LABS:                        8.9    8.1   )-----------( 514      ( 22 Mar 2018 06:35 )             26.9     03-22    127<L>  |  89<L>  |  37.0<H>  ----------------------------<  154<H>  4.0   |  22.0  |  5.21<H>    Ca    8.0<L>      22 Mar 2018 06:35    TPro  6.0<L>  /  Alb  2.1<L>  /  TBili  0.3<L>  /  DBili  x   /  AST  62<H>  /  ALT  39<H>  /  AlkPhos  121<H>  03-20            RADIOLOGY & ADDITIONAL TESTS:

## 2020-07-17 DIAGNOSIS — Z01.818 ENCOUNTER FOR OTHER PREPROCEDURAL EXAMINATION: ICD-10-CM

## 2020-07-18 ENCOUNTER — APPOINTMENT (OUTPATIENT)
Dept: DISASTER EMERGENCY | Facility: CLINIC | Age: 60
End: 2020-07-18

## 2020-07-19 LAB — SARS-COV-2 N GENE NPH QL NAA+PROBE: NOT DETECTED

## 2020-07-21 ENCOUNTER — OUTPATIENT (OUTPATIENT)
Dept: OUTPATIENT SERVICES | Facility: HOSPITAL | Age: 60
LOS: 1 days | End: 2020-07-21

## 2020-07-21 ENCOUNTER — APPOINTMENT (OUTPATIENT)
Dept: INTERVENTIONAL RADIOLOGY/VASCULAR | Facility: CLINIC | Age: 60
End: 2020-07-21
Payer: MEDICARE

## 2020-07-21 DIAGNOSIS — I77.0 ARTERIOVENOUS FISTULA, ACQUIRED: Chronic | ICD-10-CM

## 2020-07-21 DIAGNOSIS — Z00.8 ENCOUNTER FOR OTHER GENERAL EXAMINATION: ICD-10-CM

## 2020-07-21 DIAGNOSIS — Z49.01 ENCOUNTER FOR FITTING AND ADJUSTMENT OF EXTRACORPOREAL DIALYSIS CATHETER: Chronic | ICD-10-CM

## 2020-07-21 PROCEDURE — 49083 ABD PARACENTESIS W/IMAGING: CPT

## 2020-08-14 LAB
BASOPHILS # BLD AUTO: 0.03 K/UL
BASOPHILS NFR BLD AUTO: 0.7 %
EOSINOPHIL # BLD AUTO: 0.3 K/UL
EOSINOPHIL NFR BLD AUTO: 6.9 %
HCT VFR BLD CALC: 36.8 %
HGB BLD-MCNC: 11.3 G/DL
IMM GRANULOCYTES NFR BLD AUTO: 0.5 %
INR PPP: 0.97 RATIO
LYMPHOCYTES # BLD AUTO: 0.53 K/UL
LYMPHOCYTES NFR BLD AUTO: 12.2 %
MAN DIFF?: NORMAL
MCHC RBC-ENTMCNC: 29.9 PG
MCHC RBC-ENTMCNC: 30.7 GM/DL
MCV RBC AUTO: 97.4 FL
MONOCYTES # BLD AUTO: 0.32 K/UL
MONOCYTES NFR BLD AUTO: 7.4 %
NEUTROPHILS # BLD AUTO: 3.15 K/UL
NEUTROPHILS NFR BLD AUTO: 72.3 %
PLATELET # BLD AUTO: 221 K/UL
PT BLD: 11.5 SEC
RBC # BLD: 3.78 M/UL
RBC # FLD: 14.4 %
WBC # FLD AUTO: 4.35 K/UL

## 2020-08-15 ENCOUNTER — APPOINTMENT (OUTPATIENT)
Dept: DISASTER EMERGENCY | Facility: CLINIC | Age: 60
End: 2020-08-15

## 2020-08-17 LAB — SARS-COV-2 N GENE NPH QL NAA+PROBE: NOT DETECTED

## 2020-08-18 ENCOUNTER — APPOINTMENT (OUTPATIENT)
Dept: INTERVENTIONAL RADIOLOGY/VASCULAR | Facility: CLINIC | Age: 60
End: 2020-08-18
Payer: MEDICARE

## 2020-08-18 ENCOUNTER — OUTPATIENT (OUTPATIENT)
Dept: OUTPATIENT SERVICES | Facility: HOSPITAL | Age: 60
LOS: 1 days | End: 2020-08-18

## 2020-08-18 DIAGNOSIS — Z49.01 ENCOUNTER FOR FITTING AND ADJUSTMENT OF EXTRACORPOREAL DIALYSIS CATHETER: Chronic | ICD-10-CM

## 2020-08-18 DIAGNOSIS — I77.0 ARTERIOVENOUS FISTULA, ACQUIRED: Chronic | ICD-10-CM

## 2020-08-18 DIAGNOSIS — Z00.8 ENCOUNTER FOR OTHER GENERAL EXAMINATION: ICD-10-CM

## 2020-08-18 PROCEDURE — 49083 ABD PARACENTESIS W/IMAGING: CPT

## 2020-08-30 NOTE — DISCHARGE NOTE ADULT - PATIENT PORTAL LINK FT
You can access the [facility] Patient Portal, offered by [facility], by registering with the following website: [email or web address]

## 2020-09-17 ENCOUNTER — APPOINTMENT (OUTPATIENT)
Dept: DISASTER EMERGENCY | Facility: CLINIC | Age: 60
End: 2020-09-17

## 2020-09-17 LAB — SARS-COV-2 N GENE NPH QL NAA+PROBE: NOT DETECTED

## 2020-09-23 ENCOUNTER — OUTPATIENT (OUTPATIENT)
Dept: OUTPATIENT SERVICES | Facility: HOSPITAL | Age: 60
LOS: 1 days | End: 2020-09-23

## 2020-09-23 ENCOUNTER — APPOINTMENT (OUTPATIENT)
Dept: INTERVENTIONAL RADIOLOGY/VASCULAR | Facility: CLINIC | Age: 60
End: 2020-09-23
Payer: MEDICARE

## 2020-09-23 DIAGNOSIS — Z49.01 ENCOUNTER FOR FITTING AND ADJUSTMENT OF EXTRACORPOREAL DIALYSIS CATHETER: Chronic | ICD-10-CM

## 2020-09-23 DIAGNOSIS — I77.0 ARTERIOVENOUS FISTULA, ACQUIRED: Chronic | ICD-10-CM

## 2020-09-23 DIAGNOSIS — Z00.8 ENCOUNTER FOR OTHER GENERAL EXAMINATION: ICD-10-CM

## 2020-09-23 LAB
BASOPHILS # BLD AUTO: 0.05 K/UL
BASOPHILS NFR BLD AUTO: 1.2 %
EOSINOPHIL # BLD AUTO: 0.23 K/UL
EOSINOPHIL NFR BLD AUTO: 5.3 %
HCT VFR BLD CALC: 36.4 %
HGB BLD-MCNC: 11.2 G/DL
IMM GRANULOCYTES NFR BLD AUTO: 0.2 %
INR PPP: 1.05 RATIO
LYMPHOCYTES # BLD AUTO: 0.62 K/UL
LYMPHOCYTES NFR BLD AUTO: 14.4 %
MAN DIFF?: NORMAL
MCHC RBC-ENTMCNC: 29.7 PG
MCHC RBC-ENTMCNC: 30.8 GM/DL
MCV RBC AUTO: 96.6 FL
MONOCYTES # BLD AUTO: 0.45 K/UL
MONOCYTES NFR BLD AUTO: 10.4 %
NEUTROPHILS # BLD AUTO: 2.96 K/UL
NEUTROPHILS NFR BLD AUTO: 68.5 %
PLATELET # BLD AUTO: 243 K/UL
PT BLD: 12.4 SEC
RBC # BLD: 3.77 M/UL
RBC # FLD: 15.3 %
WBC # FLD AUTO: 4.32 K/UL

## 2020-09-23 PROCEDURE — 49083 ABD PARACENTESIS W/IMAGING: CPT

## 2020-10-07 NOTE — PROGRESS NOTE ADULT - SUBJECTIVE AND OBJECTIVE BOX
CC: Pleural effusion     INTERVAL HPI/OVERNIGHT EVENTS: Patient seen and examined, chest tube removed this morning. No afebrile. Denies sob, cough or palpitations. Denies abdominal pain, nausea or vomiting. Denies diarrhea or urinary complaints.       Vital Signs Last 24 Hrs  T(C): 36.7 (02 Sep 2018 10:35), Max: 37.1 (01 Sep 2018 21:00)  T(F): 98 (02 Sep 2018 10:35), Max: 98.8 (01 Sep 2018 21:00)  HR: 79 (02 Sep 2018 10:35) (75 - 84)  BP: 130/85 (02 Sep 2018 10:35) (130/85 - 160/89)  BP(mean): --  RR: 18 (02 Sep 2018 10:35) (17 - 20)  SpO2: 95% (02 Sep 2018 10:35) (95% - 98%)    PHYSICAL EXAM:    GENERAL: NAD, AOX3  HEAD:  Atraumatic, Normocephalic  EYES: EOMI, PERRLA, conjunctiva and sclera clear  ENMT: Moist mucous membranes  NECK: Supple, No JVD  CHEST/LUNG: Clear to auscultation bilaterally; No rales, rhonchi, wheezing, or rubs + PErmacath  HEART: Regular rate and rhythm; No murmurs, rubs, or gallops  ABDOMEN: Soft, Nontender, Nondistended; Bowel sounds present  EXTREMITIES:  2+ Peripheral Pulses, No clubbing, cyanosis, or edema        MEDICATIONS  (STANDING):  aspirin enteric coated 81 milliGRAM(s) Oral daily  calcitriol   Capsule 0.25 MICROGram(s) Oral two times a day  calcium carbonate   1250 mG (OsCal) 1 Tablet(s) Oral three times a day  carvedilol 12.5 milliGRAM(s) Oral every 12 hours  chlorhexidine 2% Cloths 1 Application(s) Topical <User Schedule>  dextrose 5%. 1000 milliLiter(s) (50 mL/Hr) IV Continuous <Continuous>  dextrose 50% Injectable 12.5 Gram(s) IV Push once  dextrose 50% Injectable 25 Gram(s) IV Push once  dextrose 50% Injectable 25 Gram(s) IV Push once  docusate sodium 100 milliGRAM(s) Oral two times a day  ferrous    sulfate 325 milliGRAM(s) Oral daily  heparin  Injectable 5000 Unit(s) SubCutaneous every 12 hours  insulin lispro (HumaLOG) corrective regimen sliding scale   SubCutaneous three times a day before meals  insulin lispro (HumaLOG) corrective regimen sliding scale   SubCutaneous at bedtime  piperacillin/tazobactam IVPB. 2.25 Gram(s) IV Intermittent every 12 hours  sacubitril 24 mG/valsartan 26 mG 1 Tablet(s) Oral two times a day  sodium chloride 0.9% 1000 milliLiter(s) (43 mL/Hr) IV Continuous <Continuous>    MEDICATIONS  (PRN):  acetaminophen   Tablet 650 milliGRAM(s) Oral every 6 hours PRN For Temp greater than 38 C (100.4 F)  acetaminophen   Tablet. 650 milliGRAM(s) Oral every 6 hours PRN Mild Pain (1 - 3)  dextrose 40% Gel 15 Gram(s) Oral once PRN Blood Glucose LESS THAN 70 milliGRAM(s)/deciliter  glucagon  Injectable 1 milliGRAM(s) IntraMuscular once PRN Glucose LESS THAN 70 milligrams/deciliter  morphine  - Injectable 2 milliGRAM(s) IV Push every 4 hours PRN Severe Pain (7 - 10)  oxyCODONE    5 mG/acetaminophen 325 mG 2 Tablet(s) Oral every 4 hours PRN Moderate Pain (4 - 6)  senna 2 Tablet(s) Oral at bedtime PRN Constipation      Allergies    No Known Allergies    Intolerances          LABS:                          10.1   3.4   )-----------( 219      ( 02 Sep 2018 08:51 )             33.1     09-    130<L>  |  93<L>  |  21.0<H>  ----------------------------<  158<H>  4.6   |  21.0<L>  |  3.18<H>    Ca    8.3<L>      02 Sep 2018 08:51  Phos  2.6     09-    TPro  3.2<L>  /  Alb  1.6<L>  /  TBili  0.2<L>  /  DBili  x   /  AST  11  /  ALT  <5  /  AlkPhos  43  09-      Urinalysis Basic - ( 01 Sep 2018 10:13 )    Color: Yellow / Appearance: Slightly Turbid / S.010 / pH: x  Gluc: x / Ketone: Negative  / Bili: Negative / Urobili: Negative mg/dL   Blood: x / Protein: 500 mg/dL / Nitrite: Negative   Leuk Esterase: Moderate / RBC: >50 /HPF / WBC >50   Sq Epi: x / Non Sq Epi: Moderate / Bacteria: Moderate        RADIOLOGY & ADDITIONAL TESTS: no

## 2020-10-24 ENCOUNTER — APPOINTMENT (OUTPATIENT)
Dept: DISASTER EMERGENCY | Facility: CLINIC | Age: 60
End: 2020-10-24

## 2020-10-27 ENCOUNTER — OUTPATIENT (OUTPATIENT)
Dept: OUTPATIENT SERVICES | Facility: HOSPITAL | Age: 60
LOS: 1 days | End: 2020-10-27

## 2020-10-27 ENCOUNTER — APPOINTMENT (OUTPATIENT)
Dept: INTERVENTIONAL RADIOLOGY/VASCULAR | Facility: CLINIC | Age: 60
End: 2020-10-27
Payer: MEDICARE

## 2020-10-27 DIAGNOSIS — Z49.01 ENCOUNTER FOR FITTING AND ADJUSTMENT OF EXTRACORPOREAL DIALYSIS CATHETER: Chronic | ICD-10-CM

## 2020-10-27 DIAGNOSIS — Z00.8 ENCOUNTER FOR OTHER GENERAL EXAMINATION: ICD-10-CM

## 2020-10-27 DIAGNOSIS — I77.0 ARTERIOVENOUS FISTULA, ACQUIRED: Chronic | ICD-10-CM

## 2020-10-27 LAB
BASOPHILS # BLD AUTO: 0.04 K/UL
BASOPHILS NFR BLD AUTO: 1.2 %
EOSINOPHIL # BLD AUTO: 0.2 K/UL
EOSINOPHIL NFR BLD AUTO: 5.8 %
HCT VFR BLD CALC: 34.9 %
HGB BLD-MCNC: 10.4 G/DL
IMM GRANULOCYTES NFR BLD AUTO: 0.3 %
INR PPP: 1.02 RATIO
LYMPHOCYTES # BLD AUTO: 0.63 K/UL
LYMPHOCYTES NFR BLD AUTO: 18.2 %
MAN DIFF?: NORMAL
MCHC RBC-ENTMCNC: 29.1 PG
MCHC RBC-ENTMCNC: 29.8 GM/DL
MCV RBC AUTO: 97.5 FL
MONOCYTES # BLD AUTO: 0.35 K/UL
MONOCYTES NFR BLD AUTO: 10.1 %
NEUTROPHILS # BLD AUTO: 2.24 K/UL
NEUTROPHILS NFR BLD AUTO: 64.4 %
PLATELET # BLD AUTO: 197 K/UL
PT BLD: 12.1 SEC
RBC # BLD: 3.58 M/UL
RBC # FLD: 15.3 %
SARS-COV-2 N GENE NPH QL NAA+PROBE: NOT DETECTED
WBC # FLD AUTO: 3.47 K/UL

## 2020-10-27 PROCEDURE — 49083 ABD PARACENTESIS W/IMAGING: CPT

## 2020-11-29 NOTE — PROCEDURE NOTE - ESTIMATED BLOOD LOSS
Purposeful Rounding    Patient Location Patient room    Patient willing to engage in conversationYes    Presentation/behavior Anxious, Cooperative and Pleasant    Affect Neutral/Euthymic(normal)    Concerns reported: reports not sleeping well until receiving prn's    PRN medications given: informed to request meds as needed    Environmental assessment Room free from clutter    Fall prevention interventions in place: low risk    Daily [place] Fall Risk Score : 71    Daily [place] Fall Risk Score : low none

## 2020-12-08 ENCOUNTER — APPOINTMENT (OUTPATIENT)
Dept: INTERVENTIONAL RADIOLOGY/VASCULAR | Facility: CLINIC | Age: 60
End: 2020-12-08
Payer: MEDICARE

## 2020-12-08 ENCOUNTER — OUTPATIENT (OUTPATIENT)
Dept: OUTPATIENT SERVICES | Facility: HOSPITAL | Age: 60
LOS: 1 days | End: 2020-12-08

## 2020-12-08 DIAGNOSIS — Z00.8 ENCOUNTER FOR OTHER GENERAL EXAMINATION: ICD-10-CM

## 2020-12-08 DIAGNOSIS — I77.0 ARTERIOVENOUS FISTULA, ACQUIRED: Chronic | ICD-10-CM

## 2020-12-08 DIAGNOSIS — Z49.01 ENCOUNTER FOR FITTING AND ADJUSTMENT OF EXTRACORPOREAL DIALYSIS CATHETER: Chronic | ICD-10-CM

## 2020-12-08 PROCEDURE — 49083 ABD PARACENTESIS W/IMAGING: CPT

## 2020-12-08 NOTE — INPATIENT CERTIFICATION FOR MEDICARE PATIENTS - CURRENT MEDICAL NEEDS AND CARE PLANS
Possible Home Otolaryngologist Procedure Text (A): After obtaining clear surgical margins the patient was sent to otolaryngology for surgical repair.  The patient understands they will receive post-surgical care and follow-up from the referring physician's office.

## 2020-12-14 LAB
BASOPHILS # BLD AUTO: 0.03 K/UL
BASOPHILS NFR BLD AUTO: 0.8 %
EOSINOPHIL # BLD AUTO: 0.15 K/UL
EOSINOPHIL NFR BLD AUTO: 4.2 %
HCT VFR BLD CALC: 37.1 %
HGB BLD-MCNC: 11.3 G/DL
IMM GRANULOCYTES NFR BLD AUTO: 0.3 %
INR PPP: 1.04 RATIO
LYMPHOCYTES # BLD AUTO: 0.53 K/UL
LYMPHOCYTES NFR BLD AUTO: 14.7 %
MAN DIFF?: NORMAL
MCHC RBC-ENTMCNC: 29.4 PG
MCHC RBC-ENTMCNC: 30.5 GM/DL
MCV RBC AUTO: 96.6 FL
MONOCYTES # BLD AUTO: 0.29 K/UL
MONOCYTES NFR BLD AUTO: 8 %
NEUTROPHILS # BLD AUTO: 2.6 K/UL
NEUTROPHILS NFR BLD AUTO: 72 %
PLATELET # BLD AUTO: 198 K/UL
PT BLD: 12.2 SEC
RBC # BLD: 3.84 M/UL
RBC # FLD: 16 %
SARS-COV-2 N GENE NPH QL NAA+PROBE: NOT DETECTED
WBC # FLD AUTO: 3.61 K/UL

## 2020-12-17 NOTE — ED STATDOCS - NS_ATTENDINGSCRIBE_ED_ALL_ED
I personally performed the service described in the documentation recorded by the scribe in my presence, and it accurately and completely records my words and actions. intact

## 2021-01-01 ENCOUNTER — APPOINTMENT (OUTPATIENT)
Dept: INTERVENTIONAL RADIOLOGY/VASCULAR | Facility: CLINIC | Age: 61
End: 2021-01-01
Payer: MEDICARE

## 2021-01-01 ENCOUNTER — INPATIENT (INPATIENT)
Facility: HOSPITAL | Age: 61
LOS: 2 days | Discharge: HOME | DRG: 432 | End: 2021-12-23
Attending: HOSPITALIST | Admitting: STUDENT IN AN ORGANIZED HEALTH CARE EDUCATION/TRAINING PROGRAM
Payer: MEDICARE

## 2021-01-01 ENCOUNTER — TRANSCRIPTION ENCOUNTER (OUTPATIENT)
Age: 61
End: 2021-01-01

## 2021-01-01 ENCOUNTER — OUTPATIENT (OUTPATIENT)
Dept: OUTPATIENT SERVICES | Facility: HOSPITAL | Age: 61
LOS: 1 days | End: 2021-01-01

## 2021-01-01 ENCOUNTER — LABORATORY RESULT (OUTPATIENT)
Age: 61
End: 2021-01-01

## 2021-01-01 ENCOUNTER — APPOINTMENT (OUTPATIENT)
Dept: INTERVENTIONAL RADIOLOGY/VASCULAR | Facility: CLINIC | Age: 61
End: 2021-01-01

## 2021-01-01 ENCOUNTER — RESULT REVIEW (OUTPATIENT)
Age: 61
End: 2021-01-01

## 2021-01-01 ENCOUNTER — NON-APPOINTMENT (OUTPATIENT)
Age: 61
End: 2021-01-01

## 2021-01-01 ENCOUNTER — APPOINTMENT (OUTPATIENT)
Dept: GASTROENTEROLOGY | Facility: CLINIC | Age: 61
End: 2021-01-01

## 2021-01-01 VITALS
OXYGEN SATURATION: 97 % | TEMPERATURE: 97.3 F | DIASTOLIC BLOOD PRESSURE: 90 MMHG | SYSTOLIC BLOOD PRESSURE: 190 MMHG | HEART RATE: 77 BPM | RESPIRATION RATE: 25 BRPM

## 2021-01-01 VITALS
SYSTOLIC BLOOD PRESSURE: 141 MMHG | RESPIRATION RATE: 22 BRPM | DIASTOLIC BLOOD PRESSURE: 105 MMHG | HEART RATE: 79 BPM | TEMPERATURE: 98.1 F | OXYGEN SATURATION: 99 %

## 2021-01-01 VITALS
DIASTOLIC BLOOD PRESSURE: 94 MMHG | SYSTOLIC BLOOD PRESSURE: 188 MMHG | HEIGHT: 63 IN | OXYGEN SATURATION: 95 % | HEART RATE: 92 BPM | RESPIRATION RATE: 18 BRPM | TEMPERATURE: 98 F

## 2021-01-01 VITALS
DIASTOLIC BLOOD PRESSURE: 88 MMHG | TEMPERATURE: 98.2 F | SYSTOLIC BLOOD PRESSURE: 171 MMHG | OXYGEN SATURATION: 100 % | HEART RATE: 86 BPM | RESPIRATION RATE: 16 BRPM

## 2021-01-01 VITALS
HEART RATE: 63 BPM | RESPIRATION RATE: 15 BRPM | SYSTOLIC BLOOD PRESSURE: 185 MMHG | TEMPERATURE: 97.6 F | OXYGEN SATURATION: 98 % | DIASTOLIC BLOOD PRESSURE: 95 MMHG

## 2021-01-01 VITALS
SYSTOLIC BLOOD PRESSURE: 150 MMHG | DIASTOLIC BLOOD PRESSURE: 74 MMHG | HEART RATE: 78 BPM | TEMPERATURE: 98 F | RESPIRATION RATE: 18 BRPM | OXYGEN SATURATION: 98 %

## 2021-01-01 VITALS
HEART RATE: 87 BPM | SYSTOLIC BLOOD PRESSURE: 178 MMHG | DIASTOLIC BLOOD PRESSURE: 94 MMHG | OXYGEN SATURATION: 98 % | RESPIRATION RATE: 16 BRPM | TEMPERATURE: 98 F

## 2021-01-01 VITALS
RESPIRATION RATE: 24 BRPM | DIASTOLIC BLOOD PRESSURE: 83 MMHG | HEART RATE: 78 BPM | SYSTOLIC BLOOD PRESSURE: 167 MMHG | OXYGEN SATURATION: 98 % | TEMPERATURE: 98.1 F

## 2021-01-01 DIAGNOSIS — Z49.01 ENCOUNTER FOR FITTING AND ADJUSTMENT OF EXTRACORPOREAL DIALYSIS CATHETER: Chronic | ICD-10-CM

## 2021-01-01 DIAGNOSIS — I77.0 ARTERIOVENOUS FISTULA, ACQUIRED: Chronic | ICD-10-CM

## 2021-01-01 DIAGNOSIS — Z00.8 ENCOUNTER FOR OTHER GENERAL EXAMINATION: ICD-10-CM

## 2021-01-01 DIAGNOSIS — R10.9 UNSPECIFIED ABDOMINAL PAIN: ICD-10-CM

## 2021-01-01 DIAGNOSIS — Z00.00 ENCOUNTER FOR GENERAL ADULT MEDICAL EXAMINATION WITHOUT ABNORMAL FINDINGS: ICD-10-CM

## 2021-01-01 LAB
% ALBUMIN: 42.8 % — SIGNIFICANT CHANGE UP
% ALPHA 1: 7.7 % — SIGNIFICANT CHANGE UP
% ALPHA 2: 14.1 % — SIGNIFICANT CHANGE UP
% BETA: 10.9 % — SIGNIFICANT CHANGE UP
% GAMMA: 24.5 % — SIGNIFICANT CHANGE UP
AFP-TM SERPL-MCNC: <1.8 NG/ML — SIGNIFICANT CHANGE UP
ALBUMIN FLD-MCNC: 1.2 G/DL — SIGNIFICANT CHANGE UP
ALBUMIN SERPL ELPH-MCNC: 2.7 G/DL — LOW (ref 3.6–5.5)
ALBUMIN SERPL ELPH-MCNC: 3.4 G/DL — SIGNIFICANT CHANGE UP (ref 3.3–5.2)
ALBUMIN/GLOB SERPL ELPH: 0.7 RATIO — SIGNIFICANT CHANGE UP
ALP SERPL-CCNC: 207 U/L — HIGH (ref 40–120)
ALPHA1 GLOB SERPL ELPH-MCNC: 0.5 G/DL — HIGH (ref 0.1–0.4)
ALPHA2 GLOB SERPL ELPH-MCNC: 0.9 G/DL — SIGNIFICANT CHANGE UP (ref 0.5–1)
ALT FLD-CCNC: 18 U/L — SIGNIFICANT CHANGE UP
ANA PAT FLD IF-IMP: ABNORMAL
ANA TITR SER: ABNORMAL
ANION GAP SERPL CALC-SCNC: 14 MMOL/L — SIGNIFICANT CHANGE UP (ref 5–17)
ANION GAP SERPL CALC-SCNC: 15 MMOL/L — SIGNIFICANT CHANGE UP (ref 5–17)
ANION GAP SERPL CALC-SCNC: 16 MMOL/L — SIGNIFICANT CHANGE UP (ref 5–17)
AST SERPL-CCNC: 21 U/L — SIGNIFICANT CHANGE UP
B PERT IGG+IGM PNL SER: CLEAR — SIGNIFICANT CHANGE UP
B-GLOBULIN SERPL ELPH-MCNC: 0.7 G/DL — SIGNIFICANT CHANGE UP (ref 0.5–1)
BASOPHILS # BLD AUTO: 0.02 K/UL — SIGNIFICANT CHANGE UP (ref 0–0.2)
BASOPHILS # BLD AUTO: 0.02 K/UL — SIGNIFICANT CHANGE UP (ref 0–0.2)
BASOPHILS # BLD AUTO: 0.03 K/UL
BASOPHILS # BLD AUTO: 0.04 K/UL
BASOPHILS NFR BLD AUTO: 0.3 % — SIGNIFICANT CHANGE UP (ref 0–2)
BASOPHILS NFR BLD AUTO: 0.3 % — SIGNIFICANT CHANGE UP (ref 0–2)
BASOPHILS NFR BLD AUTO: 0.7 %
BASOPHILS NFR BLD AUTO: 0.8 %
BASOPHILS NFR BLD AUTO: 1 %
BASOPHILS NFR BLD AUTO: 1.1 %
BILIRUB SERPL-MCNC: 0.6 MG/DL — SIGNIFICANT CHANGE UP (ref 0.4–2)
BLD GP AB SCN SERPL QL: SIGNIFICANT CHANGE UP
BUN SERPL-MCNC: 16.4 MG/DL — SIGNIFICANT CHANGE UP (ref 8–20)
BUN SERPL-MCNC: 21 MG/DL — HIGH (ref 8–20)
BUN SERPL-MCNC: 35.6 MG/DL — HIGH (ref 8–20)
CALCIUM SERPL-MCNC: 8.7 MG/DL — SIGNIFICANT CHANGE UP (ref 8.6–10.2)
CALCIUM SERPL-MCNC: 8.9 MG/DL — SIGNIFICANT CHANGE UP (ref 8.6–10.2)
CALCIUM SERPL-MCNC: 9.1 MG/DL — SIGNIFICANT CHANGE UP (ref 8.6–10.2)
CERULOPLASMIN SERPL-MCNC: 27 MG/DL — SIGNIFICANT CHANGE UP (ref 16–45)
CHLORIDE SERPL-SCNC: 92 MMOL/L — LOW (ref 98–107)
CHLORIDE SERPL-SCNC: 93 MMOL/L — LOW (ref 98–107)
CHLORIDE SERPL-SCNC: 93 MMOL/L — LOW (ref 98–107)
CO2 SERPL-SCNC: 24 MMOL/L — SIGNIFICANT CHANGE UP (ref 22–29)
CO2 SERPL-SCNC: 24 MMOL/L — SIGNIFICANT CHANGE UP (ref 22–29)
CO2 SERPL-SCNC: 26 MMOL/L — SIGNIFICANT CHANGE UP (ref 22–29)
COLOR FLD: YELLOW
CREAT SERPL-MCNC: 2.74 MG/DL — HIGH (ref 0.5–1.3)
CREAT SERPL-MCNC: 3.16 MG/DL — HIGH (ref 0.5–1.3)
CREAT SERPL-MCNC: 4.85 MG/DL — HIGH (ref 0.5–1.3)
CULTURE RESULTS: NO GROWTH — SIGNIFICANT CHANGE UP
CULTURE RESULTS: SIGNIFICANT CHANGE UP
CULTURE RESULTS: SIGNIFICANT CHANGE UP
EOSINOPHIL # BLD AUTO: 0.07 K/UL — SIGNIFICANT CHANGE UP (ref 0–0.5)
EOSINOPHIL # BLD AUTO: 0.12 K/UL
EOSINOPHIL # BLD AUTO: 0.13 K/UL — SIGNIFICANT CHANGE UP (ref 0–0.5)
EOSINOPHIL # BLD AUTO: 0.15 K/UL
EOSINOPHIL # BLD AUTO: 0.17 K/UL
EOSINOPHIL # BLD AUTO: 0.17 K/UL
EOSINOPHIL NFR BLD AUTO: 1.1 % — SIGNIFICANT CHANGE UP (ref 0–6)
EOSINOPHIL NFR BLD AUTO: 2.2 %
EOSINOPHIL NFR BLD AUTO: 2.2 % — SIGNIFICANT CHANGE UP (ref 0–6)
EOSINOPHIL NFR BLD AUTO: 3.8 %
EOSINOPHIL NFR BLD AUTO: 4.1 %
EOSINOPHIL NFR BLD AUTO: 4.6 %
FLUID INTAKE SUBSTANCE CLASS: SIGNIFICANT CHANGE UP
FLUID SEGMENTED GRANULOCYTES: 22 % — SIGNIFICANT CHANGE UP
GAMMA GLOBULIN: 1.6 G/DL — SIGNIFICANT CHANGE UP (ref 0.6–1.6)
GLUCOSE BLDC GLUCOMTR-MCNC: 158 MG/DL — HIGH (ref 70–99)
GLUCOSE FLD-MCNC: 253 MG/DL — SIGNIFICANT CHANGE UP
GLUCOSE SERPL-MCNC: 174 MG/DL — HIGH (ref 70–99)
GLUCOSE SERPL-MCNC: 199 MG/DL — HIGH (ref 70–99)
GLUCOSE SERPL-MCNC: 210 MG/DL — HIGH (ref 70–99)
GRAM STN FLD: SIGNIFICANT CHANGE UP
HAV IGG SER QL IA: REACTIVE
HAV IGM SER-ACNC: SIGNIFICANT CHANGE UP
HBV CORE AB SER-ACNC: SIGNIFICANT CHANGE UP
HBV SURFACE AB SER-ACNC: SIGNIFICANT CHANGE UP
HBV SURFACE AG SER-ACNC: SIGNIFICANT CHANGE UP
HCT VFR BLD CALC: 29.2 % — LOW (ref 34.5–45)
HCT VFR BLD CALC: 30.1 % — LOW (ref 34.5–45)
HCT VFR BLD CALC: 32.7 % — LOW (ref 34.5–45)
HCT VFR BLD CALC: 33.9 %
HCT VFR BLD CALC: 34.2 %
HCT VFR BLD CALC: 37.9 %
HCT VFR BLD CALC: 38.2 %
HCV AB S/CO SERPL IA: 0.15 S/CO — SIGNIFICANT CHANGE UP (ref 0–0.99)
HCV AB SERPL-IMP: SIGNIFICANT CHANGE UP
HGB BLD-MCNC: 10.3 G/DL
HGB BLD-MCNC: 10.5 G/DL
HGB BLD-MCNC: 10.5 G/DL — LOW (ref 11.5–15.5)
HGB BLD-MCNC: 11.5 G/DL
HGB BLD-MCNC: 11.6 G/DL
HGB BLD-MCNC: 9.2 G/DL — LOW (ref 11.5–15.5)
HGB BLD-MCNC: 9.6 G/DL — LOW (ref 11.5–15.5)
HIV 1 & 2 AB SERPL IA.RAPID: SIGNIFICANT CHANGE UP
IMM GRANULOCYTES NFR BLD AUTO: 0 %
IMM GRANULOCYTES NFR BLD AUTO: 0.2 %
IMM GRANULOCYTES NFR BLD AUTO: 0.2 %
IMM GRANULOCYTES NFR BLD AUTO: 0.3 %
IMM GRANULOCYTES NFR BLD AUTO: 0.3 % — SIGNIFICANT CHANGE UP (ref 0–1.5)
IMM GRANULOCYTES NFR BLD AUTO: 0.5 % — SIGNIFICANT CHANGE UP (ref 0–1.5)
INR BLD: 1.23 RATIO — HIGH (ref 0.88–1.16)
INR BLD: 1.3 RATIO — HIGH (ref 0.88–1.16)
INR PPP: 0.96 RATIO
INR PPP: 1 RATIO
INR PPP: 1.03 RATIO
INR PPP: 1.04 RATIO
INTERPRETATION SERPL IFE-IMP: SIGNIFICANT CHANGE UP
LACTATE BLDV-MCNC: 0.9 MMOL/L — SIGNIFICANT CHANGE UP (ref 0.5–2)
LACTATE BLDV-MCNC: 1.1 MMOL/L — SIGNIFICANT CHANGE UP (ref 0.5–2)
LDH SERPL L TO P-CCNC: 169 U/L — SIGNIFICANT CHANGE UP
LIDOCAIN IGE QN: 20 U/L — LOW (ref 22–51)
LKM AB SER-ACNC: <20.1 UNITS — SIGNIFICANT CHANGE UP (ref 0–20)
LYMPHOCYTES # BLD AUTO: 0.48 K/UL
LYMPHOCYTES # BLD AUTO: 0.73 K/UL — LOW (ref 1–3.3)
LYMPHOCYTES # BLD AUTO: 0.74 K/UL
LYMPHOCYTES # BLD AUTO: 0.75 K/UL — LOW (ref 1–3.3)
LYMPHOCYTES # BLD AUTO: 0.8 K/UL
LYMPHOCYTES # BLD AUTO: 0.86 K/UL
LYMPHOCYTES # BLD AUTO: 11 % — LOW (ref 13–44)
LYMPHOCYTES # BLD AUTO: 13 % — SIGNIFICANT CHANGE UP (ref 13–44)
LYMPHOCYTES # FLD: 40 % — SIGNIFICANT CHANGE UP
LYMPHOCYTES NFR BLD AUTO: 12.2 %
LYMPHOCYTES NFR BLD AUTO: 13.4 %
LYMPHOCYTES NFR BLD AUTO: 19.1 %
LYMPHOCYTES NFR BLD AUTO: 23.4 %
MAN DIFF?: NORMAL
MCHC RBC-ENTMCNC: 28.4 PG — SIGNIFICANT CHANGE UP (ref 27–34)
MCHC RBC-ENTMCNC: 28.7 PG — SIGNIFICANT CHANGE UP (ref 27–34)
MCHC RBC-ENTMCNC: 28.7 PG — SIGNIFICANT CHANGE UP (ref 27–34)
MCHC RBC-ENTMCNC: 29 PG
MCHC RBC-ENTMCNC: 29.6 PG
MCHC RBC-ENTMCNC: 29.7 PG
MCHC RBC-ENTMCNC: 29.7 PG
MCHC RBC-ENTMCNC: 30.1 GM/DL
MCHC RBC-ENTMCNC: 30.1 GM/DL
MCHC RBC-ENTMCNC: 30.6 GM/DL
MCHC RBC-ENTMCNC: 31 GM/DL
MCHC RBC-ENTMCNC: 31.5 GM/DL — LOW (ref 32–36)
MCHC RBC-ENTMCNC: 31.9 GM/DL — LOW (ref 32–36)
MCHC RBC-ENTMCNC: 32.1 GM/DL — SIGNIFICANT CHANGE UP (ref 32–36)
MCV RBC AUTO: 89.3 FL — SIGNIFICANT CHANGE UP (ref 80–100)
MCV RBC AUTO: 90.1 FL — SIGNIFICANT CHANGE UP (ref 80–100)
MCV RBC AUTO: 90.1 FL — SIGNIFICANT CHANGE UP (ref 80–100)
MCV RBC AUTO: 96 FL
MCV RBC AUTO: 96.2 FL
MCV RBC AUTO: 97.2 FL
MCV RBC AUTO: 98.3 FL
MESOTHL CELL # FLD: 4 % — SIGNIFICANT CHANGE UP
MITOCHONDRIA AB SER-ACNC: SIGNIFICANT CHANGE UP
MONOCYTES # BLD AUTO: 0.34 K/UL
MONOCYTES # BLD AUTO: 0.36 K/UL
MONOCYTES # BLD AUTO: 0.37 K/UL
MONOCYTES # BLD AUTO: 0.58 K/UL
MONOCYTES # BLD AUTO: 0.64 K/UL — SIGNIFICANT CHANGE UP (ref 0–0.9)
MONOCYTES # BLD AUTO: 0.83 K/UL — SIGNIFICANT CHANGE UP (ref 0–0.9)
MONOCYTES NFR BLD AUTO: 10.5 %
MONOCYTES NFR BLD AUTO: 14.3 % — HIGH (ref 2–14)
MONOCYTES NFR BLD AUTO: 8.1 %
MONOCYTES NFR BLD AUTO: 9.4 %
MONOCYTES NFR BLD AUTO: 9.6 % — SIGNIFICANT CHANGE UP (ref 2–14)
MONOCYTES NFR BLD AUTO: 9.8 %
MONOS+MACROS # FLD: 34 % — SIGNIFICANT CHANGE UP
NEUTROPHILS # BLD AUTO: 2.24 K/UL
NEUTROPHILS # BLD AUTO: 2.83 K/UL
NEUTROPHILS # BLD AUTO: 2.91 K/UL
NEUTROPHILS # BLD AUTO: 4.04 K/UL
NEUTROPHILS # BLD AUTO: 4.04 K/UL — SIGNIFICANT CHANGE UP (ref 1.8–7.4)
NEUTROPHILS # BLD AUTO: 5.15 K/UL — SIGNIFICANT CHANGE UP (ref 1.8–7.4)
NEUTROPHILS NFR BLD AUTO: 60.8 %
NEUTROPHILS NFR BLD AUTO: 67.5 %
NEUTROPHILS NFR BLD AUTO: 69.9 % — SIGNIFICANT CHANGE UP (ref 43–77)
NEUTROPHILS NFR BLD AUTO: 73 %
NEUTROPHILS NFR BLD AUTO: 73.8 %
NEUTROPHILS NFR BLD AUTO: 77.5 % — HIGH (ref 43–77)
PHOSPHATE SERPL-MCNC: 6 MG/DL — HIGH (ref 2.4–4.7)
PLATELET # BLD AUTO: 211 K/UL
PLATELET # BLD AUTO: 229 K/UL — SIGNIFICANT CHANGE UP (ref 150–400)
PLATELET # BLD AUTO: 246 K/UL — SIGNIFICANT CHANGE UP (ref 150–400)
PLATELET # BLD AUTO: 251 K/UL
PLATELET # BLD AUTO: 262 K/UL
PLATELET # BLD AUTO: 283 K/UL
PLATELET # BLD AUTO: 320 K/UL — SIGNIFICANT CHANGE UP (ref 150–400)
POTASSIUM SERPL-MCNC: 4 MMOL/L — SIGNIFICANT CHANGE UP (ref 3.5–5.3)
POTASSIUM SERPL-MCNC: 4.7 MMOL/L — SIGNIFICANT CHANGE UP (ref 3.5–5.3)
POTASSIUM SERPL-MCNC: 5.4 MMOL/L — HIGH (ref 3.5–5.3)
POTASSIUM SERPL-SCNC: 4 MMOL/L — SIGNIFICANT CHANGE UP (ref 3.5–5.3)
POTASSIUM SERPL-SCNC: 4.7 MMOL/L — SIGNIFICANT CHANGE UP (ref 3.5–5.3)
POTASSIUM SERPL-SCNC: 5.4 MMOL/L — HIGH (ref 3.5–5.3)
PROT PATTERN SERPL ELPH-IMP: SIGNIFICANT CHANGE UP
PROT SERPL-MCNC: 6.4 G/DL — SIGNIFICANT CHANGE UP (ref 6–8.3)
PROT SERPL-MCNC: 6.4 G/DL — SIGNIFICANT CHANGE UP (ref 6–8.3)
PROT SERPL-MCNC: 8.2 G/DL — SIGNIFICANT CHANGE UP (ref 6.6–8.7)
PROTHROM AB SERPL-ACNC: 14.1 SEC — HIGH (ref 10.6–13.6)
PROTHROM AB SERPL-ACNC: 14.9 SEC — HIGH (ref 10.6–13.6)
PT BLD: 11.4 SEC
PT BLD: 11.9 SEC
PT BLD: 12.2 SEC
PT BLD: 12.4 SEC
RAPID RVP RESULT: SIGNIFICANT CHANGE UP
RBC # BLD: 3.24 M/UL — LOW (ref 3.8–5.2)
RBC # BLD: 3.34 M/UL — LOW (ref 3.8–5.2)
RBC # BLD: 3.48 M/UL
RBC # BLD: 3.53 M/UL
RBC # BLD: 3.66 M/UL — LOW (ref 3.8–5.2)
RBC # BLD: 3.9 M/UL
RBC # BLD: 3.97 M/UL
RBC # FLD: 14.2 % — SIGNIFICANT CHANGE UP (ref 10.3–14.5)
RBC # FLD: 14.4 % — SIGNIFICANT CHANGE UP (ref 10.3–14.5)
RBC # FLD: 14.4 % — SIGNIFICANT CHANGE UP (ref 10.3–14.5)
RBC # FLD: 14.9 %
RBC # FLD: 15.9 %
RBC # FLD: 16.3 %
RBC # FLD: 16.6 %
RCV VOL RI: 2000 /UL — HIGH (ref 0–0)
SARS-COV-2 N GENE NPH QL NAA+PROBE: NOT DETECTED
SARS-COV-2 RNA SPEC QL NAA+PROBE: SIGNIFICANT CHANGE UP
SODIUM SERPL-SCNC: 132 MMOL/L — LOW (ref 135–145)
SODIUM SERPL-SCNC: 132 MMOL/L — LOW (ref 135–145)
SODIUM SERPL-SCNC: 133 MMOL/L — LOW (ref 135–145)
SPECIMEN SOURCE: SIGNIFICANT CHANGE UP
SURGICAL PATHOLOGY STUDY: SIGNIFICANT CHANGE UP
TOTAL NUCLEATED CELL COUNT, BODY FLUID: 181 /UL — SIGNIFICANT CHANGE UP
TUBE TYPE: SIGNIFICANT CHANGE UP
WBC # BLD: 5.79 K/UL — SIGNIFICANT CHANGE UP (ref 3.8–10.5)
WBC # BLD: 6.22 K/UL — SIGNIFICANT CHANGE UP (ref 3.8–10.5)
WBC # BLD: 6.64 K/UL — SIGNIFICANT CHANGE UP (ref 3.8–10.5)
WBC # FLD AUTO: 3.68 K/UL
WBC # FLD AUTO: 3.94 K/UL
WBC # FLD AUTO: 4.19 K/UL
WBC # FLD AUTO: 5.53 K/UL
WBC # FLD AUTO: 5.79 K/UL — SIGNIFICANT CHANGE UP (ref 3.8–10.5)
WBC # FLD AUTO: 6.22 K/UL — SIGNIFICANT CHANGE UP (ref 3.8–10.5)
WBC # FLD AUTO: 6.64 K/UL — SIGNIFICANT CHANGE UP (ref 3.8–10.5)

## 2021-01-01 PROCEDURE — 86704 HEP B CORE ANTIBODY TOTAL: CPT

## 2021-01-01 PROCEDURE — 96374 THER/PROPH/DIAG INJ IV PUSH: CPT

## 2021-01-01 PROCEDURE — 82105 ALPHA-FETOPROTEIN SERUM: CPT

## 2021-01-01 PROCEDURE — 86703 HIV-1/HIV-2 1 RESULT ANTBDY: CPT

## 2021-01-01 PROCEDURE — 85025 COMPLETE CBC W/AUTO DIFF WBC: CPT

## 2021-01-01 PROCEDURE — 82042 OTHER SOURCE ALBUMIN QUAN EA: CPT

## 2021-01-01 PROCEDURE — 85610 PROTHROMBIN TIME: CPT

## 2021-01-01 PROCEDURE — 99285 EMERGENCY DEPT VISIT HI MDM: CPT

## 2021-01-01 PROCEDURE — 93005 ELECTROCARDIOGRAM TRACING: CPT

## 2021-01-01 PROCEDURE — 74177 CT ABD & PELVIS W/CONTRAST: CPT | Mod: 26,MG

## 2021-01-01 PROCEDURE — 83605 ASSAY OF LACTIC ACID: CPT

## 2021-01-01 PROCEDURE — 87075 CULTR BACTERIA EXCEPT BLOOD: CPT

## 2021-01-01 PROCEDURE — 76942 ECHO GUIDE FOR BIOPSY: CPT

## 2021-01-01 PROCEDURE — G1004: CPT

## 2021-01-01 PROCEDURE — 99223 1ST HOSP IP/OBS HIGH 75: CPT

## 2021-01-01 PROCEDURE — 83690 ASSAY OF LIPASE: CPT

## 2021-01-01 PROCEDURE — 97116 GAIT TRAINING THERAPY: CPT

## 2021-01-01 PROCEDURE — 71045 X-RAY EXAM CHEST 1 VIEW: CPT | Mod: 26

## 2021-01-01 PROCEDURE — 87040 BLOOD CULTURE FOR BACTERIA: CPT

## 2021-01-01 PROCEDURE — 80053 COMPREHEN METABOLIC PANEL: CPT

## 2021-01-01 PROCEDURE — 86376 MICROSOMAL ANTIBODY EACH: CPT

## 2021-01-01 PROCEDURE — 99233 SBSQ HOSP IP/OBS HIGH 50: CPT

## 2021-01-01 PROCEDURE — 86803 HEPATITIS C AB TEST: CPT

## 2021-01-01 PROCEDURE — 0225U NFCT DS DNA&RNA 21 SARSCOV2: CPT

## 2021-01-01 PROCEDURE — C1729: CPT

## 2021-01-01 PROCEDURE — 86334 IMMUNOFIX E-PHORESIS SERUM: CPT

## 2021-01-01 PROCEDURE — 87340 HEPATITIS B SURFACE AG IA: CPT

## 2021-01-01 PROCEDURE — 99239 HOSP IP/OBS DSCHRG MGMT >30: CPT

## 2021-01-01 PROCEDURE — 49083 ABD PARACENTESIS W/IMAGING: CPT

## 2021-01-01 PROCEDURE — 86038 ANTINUCLEAR ANTIBODIES: CPT

## 2021-01-01 PROCEDURE — 97163 PT EVAL HIGH COMPLEX 45 MIN: CPT

## 2021-01-01 PROCEDURE — 88342 IMHCHEM/IMCYTCHM 1ST ANTB: CPT | Mod: 26

## 2021-01-01 PROCEDURE — 43239 EGD BIOPSY SINGLE/MULTIPLE: CPT

## 2021-01-01 PROCEDURE — 87102 FUNGUS ISOLATION CULTURE: CPT

## 2021-01-01 PROCEDURE — 93010 ELECTROCARDIOGRAM REPORT: CPT

## 2021-01-01 PROCEDURE — 88305 TISSUE EXAM BY PATHOLOGIST: CPT | Mod: 26

## 2021-01-01 PROCEDURE — 84100 ASSAY OF PHOSPHORUS: CPT

## 2021-01-01 PROCEDURE — 87205 SMEAR GRAM STAIN: CPT

## 2021-01-01 PROCEDURE — T1013: CPT

## 2021-01-01 PROCEDURE — 99261: CPT

## 2021-01-01 PROCEDURE — 82962 GLUCOSE BLOOD TEST: CPT

## 2021-01-01 PROCEDURE — 84165 PROTEIN E-PHORESIS SERUM: CPT

## 2021-01-01 PROCEDURE — 36415 COLL VENOUS BLD VENIPUNCTURE: CPT

## 2021-01-01 PROCEDURE — 86901 BLOOD TYPING SEROLOGIC RH(D): CPT

## 2021-01-01 PROCEDURE — 83615 LACTATE (LD) (LDH) ENZYME: CPT

## 2021-01-01 PROCEDURE — 86708 HEPATITIS A ANTIBODY: CPT

## 2021-01-01 PROCEDURE — 84155 ASSAY OF PROTEIN SERUM: CPT

## 2021-01-01 PROCEDURE — 82390 ASSAY OF CERULOPLASMIN: CPT

## 2021-01-01 PROCEDURE — 86850 RBC ANTIBODY SCREEN: CPT

## 2021-01-01 PROCEDURE — 97530 THERAPEUTIC ACTIVITIES: CPT

## 2021-01-01 PROCEDURE — 88342 IMHCHEM/IMCYTCHM 1ST ANTB: CPT

## 2021-01-01 PROCEDURE — 86900 BLOOD TYPING SEROLOGIC ABO: CPT

## 2021-01-01 PROCEDURE — 71045 X-RAY EXAM CHEST 1 VIEW: CPT

## 2021-01-01 PROCEDURE — 89051 BODY FLUID CELL COUNT: CPT

## 2021-01-01 PROCEDURE — 86381 MITOCHONDRIAL ANTIBODY EACH: CPT

## 2021-01-01 PROCEDURE — 87070 CULTURE OTHR SPECIMN AEROBIC: CPT

## 2021-01-01 PROCEDURE — 85027 COMPLETE CBC AUTOMATED: CPT

## 2021-01-01 PROCEDURE — 88305 TISSUE EXAM BY PATHOLOGIST: CPT

## 2021-01-01 PROCEDURE — 86706 HEP B SURFACE ANTIBODY: CPT

## 2021-01-01 PROCEDURE — 82945 GLUCOSE OTHER FLUID: CPT

## 2021-01-01 PROCEDURE — 86709 HEPATITIS A IGM ANTIBODY: CPT

## 2021-01-01 PROCEDURE — 74177 CT ABD & PELVIS W/CONTRAST: CPT | Mod: MG

## 2021-01-01 PROCEDURE — 96375 TX/PRO/DX INJ NEW DRUG ADDON: CPT

## 2021-01-01 PROCEDURE — 80048 BASIC METABOLIC PNL TOTAL CA: CPT

## 2021-01-01 RX ORDER — METOPROLOL TARTRATE 50 MG
2 TABLET ORAL
Qty: 0 | Refills: 0 | DISCHARGE

## 2021-01-01 RX ORDER — CALCIUM ACETATE 667 MG
667 TABLET ORAL
Refills: 0 | Status: DISCONTINUED | OUTPATIENT
Start: 2021-01-01 | End: 2021-01-01

## 2021-01-01 RX ORDER — HYDRALAZINE HCL 50 MG
25 TABLET ORAL THREE TIMES A DAY
Refills: 0 | Status: DISCONTINUED | OUTPATIENT
Start: 2021-01-01 | End: 2021-01-01

## 2021-01-01 RX ORDER — MORPHINE SULFATE 50 MG/1
4 CAPSULE, EXTENDED RELEASE ORAL ONCE
Refills: 0 | Status: DISCONTINUED | OUTPATIENT
Start: 2021-01-01 | End: 2021-01-01

## 2021-01-01 RX ORDER — LEVOTHYROXINE SODIUM 125 MCG
50 TABLET ORAL DAILY
Refills: 0 | Status: DISCONTINUED | OUTPATIENT
Start: 2021-01-01 | End: 2021-01-01

## 2021-01-01 RX ORDER — ONDANSETRON 8 MG/1
4 TABLET, FILM COATED ORAL EVERY 8 HOURS
Refills: 0 | Status: DISCONTINUED | OUTPATIENT
Start: 2021-01-01 | End: 2021-01-01

## 2021-01-01 RX ORDER — ACETAMINOPHEN 500 MG
650 TABLET ORAL EVERY 6 HOURS
Refills: 0 | Status: DISCONTINUED | OUTPATIENT
Start: 2021-01-01 | End: 2021-01-01

## 2021-01-01 RX ORDER — LANOLIN ALCOHOL/MO/W.PET/CERES
3 CREAM (GRAM) TOPICAL AT BEDTIME
Refills: 0 | Status: DISCONTINUED | OUTPATIENT
Start: 2021-01-01 | End: 2021-01-01

## 2021-01-01 RX ORDER — ATORVASTATIN CALCIUM 80 MG/1
40 TABLET, FILM COATED ORAL AT BEDTIME
Refills: 0 | Status: DISCONTINUED | OUTPATIENT
Start: 2021-01-01 | End: 2021-01-01

## 2021-01-01 RX ORDER — METOPROLOL TARTRATE 50 MG
50 TABLET ORAL DAILY
Refills: 0 | Status: DISCONTINUED | OUTPATIENT
Start: 2021-01-01 | End: 2021-01-01

## 2021-01-01 RX ORDER — ALENDRONATE SODIUM 70 MG/1
1 TABLET ORAL
Qty: 0 | Refills: 0 | DISCHARGE

## 2021-01-01 RX ORDER — LANOLIN ALCOHOL/MO/W.PET/CERES
1 CREAM (GRAM) TOPICAL
Qty: 0 | Refills: 0 | DISCHARGE
Start: 2021-01-01

## 2021-01-01 RX ORDER — METOCLOPRAMIDE HCL 10 MG
10 TABLET ORAL EVERY 8 HOURS
Refills: 0 | Status: DISCONTINUED | OUTPATIENT
Start: 2021-01-01 | End: 2021-01-01

## 2021-01-01 RX ORDER — ONDANSETRON 8 MG/1
4 TABLET, FILM COATED ORAL ONCE
Refills: 0 | Status: COMPLETED | OUTPATIENT
Start: 2021-01-01 | End: 2021-01-01

## 2021-01-01 RX ORDER — HYDRALAZINE HCL 50 MG
1 TABLET ORAL
Qty: 0 | Refills: 0 | DISCHARGE

## 2021-01-01 RX ORDER — PANTOPRAZOLE SODIUM 20 MG/1
40 TABLET, DELAYED RELEASE ORAL
Refills: 0 | Status: DISCONTINUED | OUTPATIENT
Start: 2021-01-01 | End: 2021-01-01

## 2021-01-01 RX ORDER — CHLORHEXIDINE GLUCONATE 213 G/1000ML
1 SOLUTION TOPICAL
Refills: 0 | Status: DISCONTINUED | OUTPATIENT
Start: 2021-01-01 | End: 2021-01-01

## 2021-01-01 RX ORDER — SUCRALFATE 1 G
1 TABLET ORAL
Qty: 90 | Refills: 0
Start: 2021-01-01 | End: 2022-01-01

## 2021-01-01 RX ORDER — CALCIUM ACETATE 667 MG
1 TABLET ORAL
Qty: 90 | Refills: 0
Start: 2021-01-01 | End: 2022-01-01

## 2021-01-01 RX ORDER — HEPARIN SODIUM 5000 [USP'U]/ML
5000 INJECTION INTRAVENOUS; SUBCUTANEOUS EVERY 8 HOURS
Refills: 0 | Status: DISCONTINUED | OUTPATIENT
Start: 2021-01-01 | End: 2021-01-01

## 2021-01-01 RX ORDER — SUCRALFATE 1 G
1 TABLET ORAL
Refills: 0 | Status: DISCONTINUED | OUTPATIENT
Start: 2021-01-01 | End: 2021-01-01

## 2021-01-01 RX ORDER — LISINOPRIL 2.5 MG/1
1 TABLET ORAL
Qty: 0 | Refills: 0 | DISCHARGE

## 2021-01-01 RX ORDER — PANTOPRAZOLE SODIUM 20 MG/1
1 TABLET, DELAYED RELEASE ORAL
Qty: 30 | Refills: 0
Start: 2021-01-01 | End: 2022-01-01

## 2021-01-01 RX ORDER — AMLODIPINE BESYLATE 2.5 MG/1
10 TABLET ORAL DAILY
Refills: 0 | Status: DISCONTINUED | OUTPATIENT
Start: 2021-01-01 | End: 2021-01-01

## 2021-01-01 RX ORDER — ASPIRIN 81 MG/1
81 TABLET ORAL
Refills: 0 | Status: DISCONTINUED | COMMUNITY
End: 2021-01-01

## 2021-01-01 RX ORDER — METOCLOPRAMIDE HCL 10 MG
1 TABLET ORAL
Qty: 60 | Refills: 0
Start: 2021-01-01 | End: 2022-01-01

## 2021-01-01 RX ADMIN — ONDANSETRON 4 MILLIGRAM(S): 8 TABLET, FILM COATED ORAL at 07:04

## 2021-01-01 RX ADMIN — ONDANSETRON 4 MILLIGRAM(S): 8 TABLET, FILM COATED ORAL at 23:01

## 2021-01-01 RX ADMIN — Medication 650 MILLIGRAM(S): at 22:01

## 2021-01-01 RX ADMIN — AMLODIPINE BESYLATE 10 MILLIGRAM(S): 2.5 TABLET ORAL at 05:14

## 2021-01-01 RX ADMIN — Medication 50 MICROGRAM(S): at 05:14

## 2021-01-01 RX ADMIN — Medication 667 MILLIGRAM(S): at 17:41

## 2021-01-01 RX ADMIN — Medication 25 MILLIGRAM(S): at 15:55

## 2021-01-01 RX ADMIN — Medication 25 MILLIGRAM(S): at 13:02

## 2021-01-01 RX ADMIN — Medication 25 MILLIGRAM(S): at 05:14

## 2021-01-01 RX ADMIN — Medication 25 MILLIGRAM(S): at 23:29

## 2021-01-01 RX ADMIN — ATORVASTATIN CALCIUM 40 MILLIGRAM(S): 80 TABLET, FILM COATED ORAL at 22:01

## 2021-01-01 RX ADMIN — Medication 50 MILLIGRAM(S): at 05:14

## 2021-01-01 RX ADMIN — Medication 667 MILLIGRAM(S): at 17:33

## 2021-01-01 RX ADMIN — ATORVASTATIN CALCIUM 40 MILLIGRAM(S): 80 TABLET, FILM COATED ORAL at 21:24

## 2021-01-01 RX ADMIN — CHLORHEXIDINE GLUCONATE 1 APPLICATION(S): 213 SOLUTION TOPICAL at 05:32

## 2021-01-01 RX ADMIN — MORPHINE SULFATE 4 MILLIGRAM(S): 50 CAPSULE, EXTENDED RELEASE ORAL at 04:22

## 2021-01-01 RX ADMIN — Medication 25 MILLIGRAM(S): at 21:24

## 2021-01-01 RX ADMIN — MORPHINE SULFATE 4 MILLIGRAM(S): 50 CAPSULE, EXTENDED RELEASE ORAL at 23:28

## 2021-01-01 RX ADMIN — MORPHINE SULFATE 4 MILLIGRAM(S): 50 CAPSULE, EXTENDED RELEASE ORAL at 04:52

## 2021-01-01 RX ADMIN — HEPARIN SODIUM 5000 UNIT(S): 5000 INJECTION INTRAVENOUS; SUBCUTANEOUS at 05:13

## 2021-01-01 RX ADMIN — MORPHINE SULFATE 4 MILLIGRAM(S): 50 CAPSULE, EXTENDED RELEASE ORAL at 23:00

## 2021-01-01 RX ADMIN — HEPARIN SODIUM 5000 UNIT(S): 5000 INJECTION INTRAVENOUS; SUBCUTANEOUS at 21:23

## 2021-01-01 RX ADMIN — Medication 25 MILLIGRAM(S): at 18:31

## 2021-01-11 LAB
BASOPHILS # BLD AUTO: 0.04 K/UL
BASOPHILS NFR BLD AUTO: 1.1 %
EOSINOPHIL # BLD AUTO: 0.21 K/UL
EOSINOPHIL NFR BLD AUTO: 5.9 %
HCT VFR BLD CALC: 43.6 %
HGB BLD-MCNC: 13 G/DL
IMM GRANULOCYTES NFR BLD AUTO: 0.3 %
INR PPP: 1.04 RATIO
LYMPHOCYTES # BLD AUTO: 0.63 K/UL
LYMPHOCYTES NFR BLD AUTO: 17.8 %
MAN DIFF?: NORMAL
MCHC RBC-ENTMCNC: 29.1 PG
MCHC RBC-ENTMCNC: 29.8 GM/DL
MCV RBC AUTO: 97.5 FL
MONOCYTES # BLD AUTO: 0.36 K/UL
MONOCYTES NFR BLD AUTO: 10.2 %
NEUTROPHILS # BLD AUTO: 2.29 K/UL
NEUTROPHILS NFR BLD AUTO: 64.7 %
PLATELET # BLD AUTO: 193 K/UL
PT BLD: 12.3 SEC
RBC # BLD: 4.47 M/UL
RBC # FLD: 15.7 %
WBC # FLD AUTO: 3.54 K/UL

## 2021-01-12 ENCOUNTER — APPOINTMENT (OUTPATIENT)
Dept: INTERVENTIONAL RADIOLOGY/VASCULAR | Facility: CLINIC | Age: 61
End: 2021-01-12
Payer: MEDICARE

## 2021-01-12 ENCOUNTER — OUTPATIENT (OUTPATIENT)
Dept: OUTPATIENT SERVICES | Facility: HOSPITAL | Age: 61
LOS: 1 days | End: 2021-01-12

## 2021-01-12 DIAGNOSIS — Z00.8 ENCOUNTER FOR OTHER GENERAL EXAMINATION: ICD-10-CM

## 2021-01-12 DIAGNOSIS — I77.0 ARTERIOVENOUS FISTULA, ACQUIRED: Chronic | ICD-10-CM

## 2021-01-12 DIAGNOSIS — Z49.01 ENCOUNTER FOR FITTING AND ADJUSTMENT OF EXTRACORPOREAL DIALYSIS CATHETER: Chronic | ICD-10-CM

## 2021-01-12 PROCEDURE — 49083 ABD PARACENTESIS W/IMAGING: CPT

## 2021-01-13 LAB — SARS-COV-2 N GENE NPH QL NAA+PROBE: NOT DETECTED

## 2021-02-06 ENCOUNTER — LABORATORY RESULT (OUTPATIENT)
Age: 61
End: 2021-02-06

## 2021-02-07 LAB
INR PPP: 1.02 RATIO
PT BLD: 12.1 SEC
SARS-COV-2 N GENE NPH QL NAA+PROBE: NOT DETECTED

## 2021-02-09 ENCOUNTER — APPOINTMENT (OUTPATIENT)
Dept: INTERVENTIONAL RADIOLOGY/VASCULAR | Facility: CLINIC | Age: 61
End: 2021-02-09
Payer: MEDICARE

## 2021-02-09 ENCOUNTER — OUTPATIENT (OUTPATIENT)
Dept: OUTPATIENT SERVICES | Facility: HOSPITAL | Age: 61
LOS: 1 days | End: 2021-02-09

## 2021-02-09 DIAGNOSIS — I77.0 ARTERIOVENOUS FISTULA, ACQUIRED: Chronic | ICD-10-CM

## 2021-02-09 DIAGNOSIS — Z49.01 ENCOUNTER FOR FITTING AND ADJUSTMENT OF EXTRACORPOREAL DIALYSIS CATHETER: Chronic | ICD-10-CM

## 2021-02-09 DIAGNOSIS — Z00.8 ENCOUNTER FOR OTHER GENERAL EXAMINATION: ICD-10-CM

## 2021-02-09 PROCEDURE — 49083 ABD PARACENTESIS W/IMAGING: CPT

## 2021-02-10 LAB
BASOPHILS # BLD AUTO: 0.03 K/UL
BASOPHILS NFR BLD AUTO: 0.9 %
EOSINOPHIL # BLD AUTO: 0.17 K/UL
EOSINOPHIL NFR BLD AUTO: 5.1 %
HCT VFR BLD CALC: 37.7 %
HGB BLD-MCNC: 11.8 G/DL
IMM GRANULOCYTES NFR BLD AUTO: 0.3 %
LYMPHOCYTES # BLD AUTO: 0.42 K/UL
LYMPHOCYTES NFR BLD AUTO: 12.7 %
MAN DIFF?: NORMAL
MCHC RBC-ENTMCNC: 29.6 PG
MCHC RBC-ENTMCNC: 31.3 GM/DL
MCV RBC AUTO: 94.7 FL
MONOCYTES # BLD AUTO: 0.3 K/UL
MONOCYTES NFR BLD AUTO: 9 %
NEUTROPHILS # BLD AUTO: 2.39 K/UL
NEUTROPHILS NFR BLD AUTO: 72 %
PLATELET # BLD AUTO: 210 K/UL
RBC # BLD: 3.98 M/UL
RBC # FLD: 13.8 %
WBC # FLD AUTO: 3.32 K/UL

## 2021-02-26 NOTE — ED STATDOCS - CPE ED SKIN NORM
[Fully active, able to carry on all pre-disease performance without restriction] : Status 0 - Fully active, able to carry on all pre-disease performance without restriction [Normal] : well developed, well nourished, in no acute distress normal...

## 2021-02-28 ENCOUNTER — TRANSCRIPTION ENCOUNTER (OUTPATIENT)
Age: 61
End: 2021-02-28

## 2021-03-01 ENCOUNTER — RESULT REVIEW (OUTPATIENT)
Age: 61
End: 2021-03-01

## 2021-03-01 ENCOUNTER — INPATIENT (INPATIENT)
Facility: HOSPITAL | Age: 61
LOS: 1 days | Discharge: HOME | DRG: 329 | End: 2021-03-03
Attending: SURGERY | Admitting: SURGERY
Payer: MEDICARE

## 2021-03-01 VITALS
TEMPERATURE: 97 F | DIASTOLIC BLOOD PRESSURE: 97 MMHG | WEIGHT: 130.07 LBS | SYSTOLIC BLOOD PRESSURE: 211 MMHG | RESPIRATION RATE: 20 BRPM | HEIGHT: 63 IN | OXYGEN SATURATION: 98 % | HEART RATE: 73 BPM

## 2021-03-01 DIAGNOSIS — Z49.01 ENCOUNTER FOR FITTING AND ADJUSTMENT OF EXTRACORPOREAL DIALYSIS CATHETER: Chronic | ICD-10-CM

## 2021-03-01 DIAGNOSIS — I77.0 ARTERIOVENOUS FISTULA, ACQUIRED: Chronic | ICD-10-CM

## 2021-03-01 DIAGNOSIS — K56.609 UNSPECIFIED INTESTINAL OBSTRUCTION, UNSPECIFIED AS TO PARTIAL VERSUS COMPLETE OBSTRUCTION: ICD-10-CM

## 2021-03-01 LAB
ALBUMIN SERPL ELPH-MCNC: 4.3 G/DL — SIGNIFICANT CHANGE UP (ref 3.3–5.2)
ALP SERPL-CCNC: 284 U/L — HIGH (ref 40–120)
ALT FLD-CCNC: 21 U/L — SIGNIFICANT CHANGE UP
ANION GAP SERPL CALC-SCNC: 24 MMOL/L — HIGH (ref 5–17)
ANISOCYTOSIS BLD QL: SLIGHT — SIGNIFICANT CHANGE UP
APTT BLD: 30.5 SEC — SIGNIFICANT CHANGE UP (ref 27.5–35.5)
AST SERPL-CCNC: 33 U/L — HIGH
BASE EXCESS BLDV CALC-SCNC: 6.1 MMOL/L — HIGH (ref -2–2)
BASOPHILS # BLD AUTO: 0.05 K/UL — SIGNIFICANT CHANGE UP (ref 0–0.2)
BASOPHILS NFR BLD AUTO: 0.9 % — SIGNIFICANT CHANGE UP (ref 0–2)
BILIRUB SERPL-MCNC: 0.5 MG/DL — SIGNIFICANT CHANGE UP (ref 0.4–2)
BLD GP AB SCN SERPL QL: SIGNIFICANT CHANGE UP
BUN SERPL-MCNC: 69 MG/DL — HIGH (ref 8–20)
CA-I SERPL-SCNC: 0.96 MMOL/L — LOW (ref 1.15–1.33)
CALCIUM SERPL-MCNC: 8.7 MG/DL — SIGNIFICANT CHANGE UP (ref 8.6–10.2)
CHLORIDE BLDV-SCNC: 87 MMOL/L — LOW (ref 98–107)
CHLORIDE SERPL-SCNC: 85 MMOL/L — LOW (ref 98–107)
CO2 SERPL-SCNC: 27 MMOL/L — SIGNIFICANT CHANGE UP (ref 22–29)
CREAT SERPL-MCNC: 6.5 MG/DL — HIGH (ref 0.5–1.3)
DACRYOCYTES BLD QL SMEAR: SLIGHT — SIGNIFICANT CHANGE UP
EOSINOPHIL # BLD AUTO: 0.1 K/UL — SIGNIFICANT CHANGE UP (ref 0–0.5)
EOSINOPHIL NFR BLD AUTO: 1.8 % — SIGNIFICANT CHANGE UP (ref 0–6)
GAS PNL BLDV: 135 MMOL/L — SIGNIFICANT CHANGE UP (ref 135–145)
GAS PNL BLDV: SIGNIFICANT CHANGE UP
GAS PNL BLDV: SIGNIFICANT CHANGE UP
GLUCOSE BLDC GLUCOMTR-MCNC: 151 MG/DL — HIGH (ref 70–99)
GLUCOSE BLDC GLUCOMTR-MCNC: 194 MG/DL — HIGH (ref 70–99)
GLUCOSE BLDV-MCNC: 234 MG/DL — HIGH (ref 70–99)
GLUCOSE SERPL-MCNC: 301 MG/DL — HIGH (ref 70–99)
HCO3 BLDV-SCNC: 30 MMOL/L — HIGH (ref 21–29)
HCT VFR BLD CALC: 34.3 % — LOW (ref 34.5–45)
HCT VFR BLDA CALC: 32 — LOW (ref 39–50)
HGB BLD CALC-MCNC: 10.3 G/DL — LOW (ref 11.5–15.5)
HGB BLD-MCNC: 10.7 G/DL — LOW (ref 11.5–15.5)
HYPOCHROMIA BLD QL: SLIGHT — SIGNIFICANT CHANGE UP
INR BLD: 1.14 RATIO — SIGNIFICANT CHANGE UP (ref 0.88–1.16)
LACTATE BLDV-MCNC: 2 MMOL/L — SIGNIFICANT CHANGE UP (ref 0.5–2)
LIDOCAIN IGE QN: 34 U/L — SIGNIFICANT CHANGE UP (ref 22–51)
LYMPHOCYTES # BLD AUTO: 0.14 K/UL — LOW (ref 1–3.3)
LYMPHOCYTES # BLD AUTO: 2.6 % — LOW (ref 13–44)
MACROCYTES BLD QL: SLIGHT — SIGNIFICANT CHANGE UP
MANUAL SMEAR VERIFICATION: SIGNIFICANT CHANGE UP
MCHC RBC-ENTMCNC: 30.1 PG — SIGNIFICANT CHANGE UP (ref 27–34)
MCHC RBC-ENTMCNC: 31.2 GM/DL — LOW (ref 32–36)
MCV RBC AUTO: 96.6 FL — SIGNIFICANT CHANGE UP (ref 80–100)
MONOCYTES # BLD AUTO: 0.28 K/UL — SIGNIFICANT CHANGE UP (ref 0–0.9)
MONOCYTES NFR BLD AUTO: 5.3 % — SIGNIFICANT CHANGE UP (ref 2–14)
NEUTROPHILS # BLD AUTO: 4.73 K/UL — SIGNIFICANT CHANGE UP (ref 1.8–7.4)
NEUTROPHILS NFR BLD AUTO: 89.4 % — HIGH (ref 43–77)
OB PNL STL: NEGATIVE — SIGNIFICANT CHANGE UP
OTHER CELLS CSF MANUAL: 11 ML/DL — LOW (ref 18–22)
OVALOCYTES BLD QL SMEAR: SLIGHT — SIGNIFICANT CHANGE UP
PCO2 BLDV: 56 MMHG — HIGH (ref 35–50)
PH BLDV: 7.37 — SIGNIFICANT CHANGE UP (ref 7.32–7.43)
PLAT MORPH BLD: NORMAL — SIGNIFICANT CHANGE UP
PLATELET # BLD AUTO: 179 K/UL — SIGNIFICANT CHANGE UP (ref 150–400)
PO2 BLDV: 51 MMHG — HIGH (ref 25–45)
POIKILOCYTOSIS BLD QL AUTO: SLIGHT — SIGNIFICANT CHANGE UP
POLYCHROMASIA BLD QL SMEAR: SLIGHT — SIGNIFICANT CHANGE UP
POTASSIUM BLDV-SCNC: 6 MMOL/L — HIGH (ref 3.4–4.5)
POTASSIUM SERPL-MCNC: 5.7 MMOL/L — HIGH (ref 3.5–5.3)
POTASSIUM SERPL-SCNC: 5.7 MMOL/L — HIGH (ref 3.5–5.3)
PROT SERPL-MCNC: 8.3 G/DL — SIGNIFICANT CHANGE UP (ref 6.6–8.7)
PROTHROM AB SERPL-ACNC: 13.1 SEC — SIGNIFICANT CHANGE UP (ref 10.6–13.6)
RBC # BLD: 3.55 M/UL — LOW (ref 3.8–5.2)
RBC # FLD: 15.4 % — HIGH (ref 10.3–14.5)
RBC BLD AUTO: ABNORMAL
SAO2 % BLDV: 82 % — SIGNIFICANT CHANGE UP
SARS-COV-2 RNA SPEC QL NAA+PROBE: SIGNIFICANT CHANGE UP
SODIUM SERPL-SCNC: 136 MMOL/L — SIGNIFICANT CHANGE UP (ref 135–145)
TROPONIN T SERPL-MCNC: 0.44 NG/ML — HIGH (ref 0–0.06)
WBC # BLD: 5.29 K/UL — SIGNIFICANT CHANGE UP (ref 3.8–10.5)
WBC # FLD AUTO: 5.29 K/UL — SIGNIFICANT CHANGE UP (ref 3.8–10.5)

## 2021-03-01 PROCEDURE — G1004: CPT

## 2021-03-01 PROCEDURE — 99285 EMERGENCY DEPT VISIT HI MDM: CPT | Mod: 25

## 2021-03-01 PROCEDURE — 36000 PLACE NEEDLE IN VEIN: CPT

## 2021-03-01 PROCEDURE — 88307 TISSUE EXAM BY PATHOLOGIST: CPT | Mod: 26

## 2021-03-01 PROCEDURE — 93010 ELECTROCARDIOGRAM REPORT: CPT

## 2021-03-01 PROCEDURE — 88302 TISSUE EXAM BY PATHOLOGIST: CPT | Mod: 26

## 2021-03-01 PROCEDURE — 74177 CT ABD & PELVIS W/CONTRAST: CPT | Mod: 26,ME

## 2021-03-01 RX ORDER — SODIUM CHLORIDE 9 MG/ML
1000 INJECTION, SOLUTION INTRAVENOUS
Refills: 0 | Status: DISCONTINUED | OUTPATIENT
Start: 2021-03-01 | End: 2021-03-03

## 2021-03-01 RX ORDER — ATORVASTATIN CALCIUM 80 MG/1
10 TABLET, FILM COATED ORAL AT BEDTIME
Refills: 0 | Status: DISCONTINUED | OUTPATIENT
Start: 2021-03-01 | End: 2021-03-03

## 2021-03-01 RX ORDER — ONDANSETRON 8 MG/1
4 TABLET, FILM COATED ORAL ONCE
Refills: 0 | Status: COMPLETED | OUTPATIENT
Start: 2021-03-01 | End: 2021-03-01

## 2021-03-01 RX ORDER — ONDANSETRON 8 MG/1
4 TABLET, FILM COATED ORAL ONCE
Refills: 0 | Status: DISCONTINUED | OUTPATIENT
Start: 2021-03-01 | End: 2021-03-01

## 2021-03-01 RX ORDER — INSULIN GLARGINE 100 [IU]/ML
4 INJECTION, SOLUTION SUBCUTANEOUS
Qty: 0 | Refills: 0 | DISCHARGE

## 2021-03-01 RX ORDER — DEXTROSE 50 % IN WATER 50 %
25 SYRINGE (ML) INTRAVENOUS ONCE
Refills: 0 | Status: DISCONTINUED | OUTPATIENT
Start: 2021-03-01 | End: 2021-03-03

## 2021-03-01 RX ORDER — FENTANYL CITRATE 50 UG/ML
50 INJECTION INTRAVENOUS
Refills: 0 | Status: DISCONTINUED | OUTPATIENT
Start: 2021-03-01 | End: 2021-03-01

## 2021-03-01 RX ORDER — INSULIN LISPRO 100/ML
VIAL (ML) SUBCUTANEOUS
Refills: 0 | Status: DISCONTINUED | OUTPATIENT
Start: 2021-03-01 | End: 2021-03-02

## 2021-03-01 RX ORDER — GLUCAGON INJECTION, SOLUTION 0.5 MG/.1ML
1 INJECTION, SOLUTION SUBCUTANEOUS ONCE
Refills: 0 | Status: DISCONTINUED | OUTPATIENT
Start: 2021-03-01 | End: 2021-03-03

## 2021-03-01 RX ORDER — MORPHINE SULFATE 50 MG/1
4 CAPSULE, EXTENDED RELEASE ORAL ONCE
Refills: 0 | Status: DISCONTINUED | OUTPATIENT
Start: 2021-03-01 | End: 2021-03-01

## 2021-03-01 RX ORDER — INSULIN LISPRO 100/ML
2 VIAL (ML) SUBCUTANEOUS
Qty: 0 | Refills: 0 | DISCHARGE

## 2021-03-01 RX ORDER — CALCITRIOL 0.5 UG/1
1 CAPSULE ORAL
Qty: 0 | Refills: 0 | DISCHARGE

## 2021-03-01 RX ORDER — DEXTROSE 50 % IN WATER 50 %
12.5 SYRINGE (ML) INTRAVENOUS ONCE
Refills: 0 | Status: DISCONTINUED | OUTPATIENT
Start: 2021-03-01 | End: 2021-03-03

## 2021-03-01 RX ORDER — PIPERACILLIN AND TAZOBACTAM 4; .5 G/20ML; G/20ML
3.38 INJECTION, POWDER, LYOPHILIZED, FOR SOLUTION INTRAVENOUS ONCE
Refills: 0 | Status: COMPLETED | OUTPATIENT
Start: 2021-03-01 | End: 2021-03-01

## 2021-03-01 RX ORDER — SODIUM CHLORIDE 9 MG/ML
1000 INJECTION, SOLUTION INTRAVENOUS
Refills: 0 | Status: DISCONTINUED | OUTPATIENT
Start: 2021-03-01 | End: 2021-03-01

## 2021-03-01 RX ORDER — METOPROLOL TARTRATE 50 MG
25 TABLET ORAL
Refills: 0 | Status: DISCONTINUED | OUTPATIENT
Start: 2021-03-01 | End: 2021-03-03

## 2021-03-01 RX ORDER — AMLODIPINE BESYLATE 2.5 MG/1
10 TABLET ORAL DAILY
Refills: 0 | Status: DISCONTINUED | OUTPATIENT
Start: 2021-03-01 | End: 2021-03-03

## 2021-03-01 RX ORDER — SODIUM CHLORIDE 9 MG/ML
1200 INJECTION INTRAMUSCULAR; INTRAVENOUS; SUBCUTANEOUS ONCE
Refills: 0 | Status: COMPLETED | OUTPATIENT
Start: 2021-03-01 | End: 2021-03-01

## 2021-03-01 RX ORDER — SACUBITRIL AND VALSARTAN 24; 26 MG/1; MG/1
1 TABLET, FILM COATED ORAL
Qty: 0 | Refills: 0 | DISCHARGE

## 2021-03-01 RX ORDER — HEPARIN SODIUM 5000 [USP'U]/ML
5000 INJECTION INTRAVENOUS; SUBCUTANEOUS EVERY 8 HOURS
Refills: 0 | Status: DISCONTINUED | OUTPATIENT
Start: 2021-03-01 | End: 2021-03-03

## 2021-03-01 RX ORDER — DEXTROSE 50 % IN WATER 50 %
15 SYRINGE (ML) INTRAVENOUS ONCE
Refills: 0 | Status: DISCONTINUED | OUTPATIENT
Start: 2021-03-01 | End: 2021-03-03

## 2021-03-01 RX ADMIN — ONDANSETRON 4 MILLIGRAM(S): 8 TABLET, FILM COATED ORAL at 09:59

## 2021-03-01 RX ADMIN — SODIUM CHLORIDE 1200 MILLILITER(S): 9 INJECTION INTRAMUSCULAR; INTRAVENOUS; SUBCUTANEOUS at 12:26

## 2021-03-01 RX ADMIN — Medication 25 MILLIGRAM(S): at 21:38

## 2021-03-01 RX ADMIN — MORPHINE SULFATE 4 MILLIGRAM(S): 50 CAPSULE, EXTENDED RELEASE ORAL at 09:59

## 2021-03-01 RX ADMIN — AMLODIPINE BESYLATE 10 MILLIGRAM(S): 2.5 TABLET ORAL at 21:38

## 2021-03-01 RX ADMIN — PIPERACILLIN AND TAZOBACTAM 200 GRAM(S): 4; .5 INJECTION, POWDER, LYOPHILIZED, FOR SOLUTION INTRAVENOUS at 12:26

## 2021-03-01 NOTE — BRIEF OPERATIVE NOTE - NSICDXBRIEFPREOP_GEN_ALL_CORE_FT
PRE-OP DIAGNOSIS:  Ventral hernia with bowel obstruction 01-Mar-2021 16:17:04  Jesús Jones  Incarcerated umbilical hernia 01-Mar-2021 16:15:45  Jesús Jones

## 2021-03-01 NOTE — H&P ADULT - NSHPPHYSICALEXAM_GEN_ALL_CORE
PHYSICAL EXAM:  GENERAL: Uncomfortable appearing, NAD  HEAD:  Atraumatic, Normocephalic  EYES: EOMI, conjunctiva and sclera clear  NECK: Supple, No JVD  CHEST/LUNG:  Unlabored, no use of accessory muscles  HEART: Regular rate and rhythm  ABDOMEN: Distended, large ventral hernia tender to palpation with dusky overlying skin changes. No rebound tenderness or guarding.   PSYCH: AAOx3  NEUROLOGY: non-focal

## 2021-03-01 NOTE — ED PROVIDER NOTE - PROGRESS NOTE DETAILS
Nicol: pt for dialysis today, benefits of iv contrast outweigh risks given pt to be dialyzed. Nicol: pt had dark emesis in ct per tech (I did not visualize), occult blood sent. radiology called saying possible close loop bowel in new hernia, surgery consulted and to see pt. lactate sent. renal consulted as well for pt dialysis. trop elevated at baseline. Nicol: surg saw pt, to take to OR. fluids at their request 20ml/kg.

## 2021-03-01 NOTE — ED ADULT TRIAGE NOTE - CHIEF COMPLAINT QUOTE
Patient brought in by ambulance A/Ox3, c/o abdominal pain and vomiting since 4am.  Patient needs dialysis today.  Patient has protruding hernia x 3 months.

## 2021-03-01 NOTE — CONSULT NOTE ADULT - ASSESSMENT
ESRD   Incarcerated hernia with suspect ischemia   Needs emergent OR   Surgery plans noted   Will make arrangements for HD post op     Anemia - Hgb stable

## 2021-03-01 NOTE — H&P ADULT - NSHPLABSRESULTS_GEN_ALL_CORE
Vital Signs Last 24 Hrs  T(C): 36.3 (01 Mar 2021 09:21), Max: 36.3 (01 Mar 2021 09:21)  T(F): 97.3 (01 Mar 2021 09:21), Max: 97.3 (01 Mar 2021 09:21)  HR: 73 (01 Mar 2021 09:21) (73 - 73)  BP: 211/97 (01 Mar 2021 09:21) (211/97 - 211/97)  BP(mean): --  RR: 20 (01 Mar 2021 09:21) (20 - 20)  SpO2: 98% (01 Mar 2021 09:21) (98% - 98%)        LABS:                        10.7   5.29  )-----------( 179      ( 01 Mar 2021 10:06 )             34.3     03-01    136  |  85<L>  |  69.0<H>  ----------------------------<  301<H>  5.7<H>   |  27.0  |  6.50<H>    Ca    8.7      01 Mar 2021 10:06    TPro  8.3  /  Alb  4.3  /  TBili  0.5  /  DBili  x   /  AST  33<H>  /  ALT  21  /  AlkPhos  284<H>  03-01    PT/INR - ( 01 Mar 2021 10:06 )   PT: 13.1 sec;   INR: 1.14 ratio         PTT - ( 01 Mar 2021 10:06 )  PTT:30.5 sec        CT A/P  IMPRESSION:  New moderate size ventral hernia contains a loop of dilated, hypoenhancing small bowel, concerning for closed loop obstruction with possible ischemia. Otherwise, no evidence of small bowel obstruction.    Large volume ascites, cardiomegaly and pleural effusions are unchanged.    Nonspecific peritoneal nodularity and calcification in the pelvis, as described above.    Stable 9 mm right middle lobe pulmonary nodule.    I discussed the findings with Dr. Camarena on 3/1/2021 at 11:40 AM.      EVANS CASTILLO MD; Attending Radiologist  This document has been electronically signed. Mar 1 2021 11:47AM

## 2021-03-01 NOTE — BRIEF OPERATIVE NOTE - OPERATION/FINDINGS
- incarcerated and strangulated umbilical hernia with approximately 15 cm of necrotic small bowel  - SBR with stapled anastomosis performed  - hernia defect measured approximately 3 cm in diameter, closed primarily with 0 PDS in running fashion   - skin re-approximated with staples

## 2021-03-01 NOTE — CONSULT NOTE ADULT - SUBJECTIVE AND OBJECTIVE BOX
Patient is a 60y old  Female who presents with a chief complaint of Abdominal pain (01 Mar 2021 12:27)      HPI:  61 y/o female with PMH of ESRD on dialysis MWF (Last dialysis Friday) htn, hld, dm, and abdominal ascites (last paracentesis 2/9/21 for 4500 cc) who presents with complaints of 1 day of abd pain with vomiting. Patient has a large ventral hernia with pain and overlying skin discoloration. She reports having had this for months, and was going to get it repaired, but has not been able to. Pain radiates to back and is also associated with fever and chills. Denies chest pain or trouble breathing. No change sin bowel movements Patient reportedly vomited 200cc of dark emesis while in CT scan. CT shows significant ascites and large hernia, which appears strangulated and with closed loop obstruction.  (01 Mar 2021 12:27)    Pt seen in ER   No SOB or CP   + large ventral incarcerated hernia , tender with dusky skin changed   + Vomiting in ER   Surgery plans for emergent OR   S/P CT with IV contrast .       PAST MEDICAL & SURGICAL HISTORY:  End stage renal disease    Diabetes    Coronary artery disease, angina presence unspecified, unspecified vessel or lesion type, unspecified whether native or transplanted heart    Paroxysmal atrial fibrillation    Anemia due to chronic kidney disease, unspecified CKD stage    Chronic systolic congestive heart failure    Pleural effusion    Pericardial effusion    End stage renal disease on dialysis    HLD (hyperlipidemia)    HTN (hypertension)    DM (diabetes mellitus)    A-V fistula    A-V fistula    Encounter for dialysis catheter care        FAMILY HISTORY:  No pertinent family history in first degree relatives    Family history of kidney disease in brother (Sibling)        Social History:    MEDICATIONS  (STANDING):    MEDICATIONS  (PRN):      Allergies    eggs (Anaphylaxis)  No Known Drug Allergies    Intolerances          Vital Signs Last 24 Hrs  T(C): 36.3 (01 Mar 2021 09:21), Max: 36.3 (01 Mar 2021 09:21)  T(F): 97.3 (01 Mar 2021 09:21), Max: 97.3 (01 Mar 2021 09:21)  HR: 73 (01 Mar 2021 09:21) (73 - 73)  BP: 211/97 (01 Mar 2021 09:21) (211/97 - 211/97)  BP(mean): --  RR: 20 (01 Mar 2021 09:21) (20 - 20)  SpO2: 98% (01 Mar 2021 09:21) (98% - 98%)  Daily Height in cm: 160.02 (01 Mar 2021 09:21)    Daily   I&O's Detail    I&O's Summary      PHYSICAL EXAM:    GENERAL: laying flat , No SOB or CP   HEAD:  Atraumatic, anicteric   ENNECK: Supple,   NERVOUS SYSTEM:  Alert & Oriented X3,   CHEST/LUNG: EAE / Clear   HEART: Regular rate and rhythm; rub   ABDOMEN: + anterior , incarcerated ventral hernia with tenderness and dusky discoloration   EXTREMITIES: + leg edema , Arm access with thrill           LABS:                        10.7   5.29  )-----------( 179      ( 01 Mar 2021 10:06 )             34.3     03-01    136  |  85<L>  |  69.0<H>  ----------------------------<  301<H>  5.7<H>   |  27.0  |  6.50<H>    Ca    8.7      01 Mar 2021 10:06    TPro  8.3  /  Alb  4.3  /  TBili  0.5  /  DBili  x   /  AST  33<H>  /  ALT  21  /  AlkPhos  284<H>  03-01    PT/INR - ( 01 Mar 2021 10:06 )   PT: 13.1 sec;   INR: 1.14 ratio         PTT - ( 01 Mar 2021 10:06 )  PTT:30.5 sec      < from: CT Abdomen and Pelvis w/ IV Cont (03.01.21 @ 11:29) >     EXAM:  CT ABDOMEN AND PELVIS IC                          PROCEDURE DATE:  03/01/2021          INTERPRETATION:  CLINICAL INFORMATION: Abdominal pain and vomiting.    COMPARISON: CT 1/14/2020    PROCEDURE:  CT of the Abdomen and Pelvis was performedwith intravenous contrast.  Intravenous contrast: 90 ml Omnipaque 350. 10 ml discarded.  Oral contrast: None.  Sagittal and coronal reformats were performed.    FINDINGS:  LOWER CHEST: Marked cardiomegaly again noted. Small right and trace left pleural effusions, without change since prior. Bibasilar  linear atelectasis noted. A 9 mm right middle lobe pulmonary nodule (3, 19) is stable.    LIVER: Within normal limits.  BILE DUCTS: Normal caliber.  GALLBLADDER: Cholelithiasis.  SPLEEN: Within normal limits.  PANCREAS: Within normal limits.  ADRENALS: Within normal limits.  KIDNEYS/URETERS: Small kidneys with small bilateral renal cysts. No hydronephrosis.    BLADDER: Nondistended.  REPRODUCTIVE ORGANS: Uterus and adnexa within normal limits.    BOWEL: Small hiatal hernia. No bowel obstruction. Appendix is not visualized. No evidence of inflammation in the pericecal region.  PERITONEUM: Large volume ascites, unchanged. There is stable peritoneal calcification in the right pelvis (3, 142). Right pelvic sidewall peritoneal thickening is decreased since prior, although there is a small residual subcentimeter nodule on series 3, image 137, nonspecific. No pneumoperitoneum.  RETROPERITONEUM/LYMPH NODES: No lymphadenopathy.  ABDOMINAL WALL: New moderate-sized ventral hernia contains fluid and dilated small bowel which appears hypoenhancing when compared with the remainder of the small bowel. No evidence of proximal small bowel obstruction.  BONES: Old left inferior pubic ramus fracture.    IMPRESSION:  New moderate size ventral hernia contains a loop of dilated, hypoenhancing small bowel, concerning for closed loop obstruction with possible ischemia. Otherwise, no evidence of small bowel obstruction.    Large volume ascites, cardiomegaly and pleural effusions are unchanged.    Nonspecific peritoneal nodularity and calcification in the pelvis, as described above.    Stable 9 mm right middle lobe pulmonary nodule.    I discussed the findings with Dr. Camarena on 3/1/2021 at 11:40 AM.            EVANS CASTILLO MD; Attending Radiologist  This document has been electronically signed. Mar  1 2021 11:47AM    < end of copied text >        RADIOLOGY & ADDITIONAL TESTS:

## 2021-03-01 NOTE — ED PROVIDER NOTE - OBJECTIVE STATEMENT
59 y/o female hx htn, hld, dm, esrd on dialysis mwf, c/o 1 day of abd pain with vomiting. Pt with large ventral hernia, pain to hernia and rest of abd. denies cp/sob, f/c, neuro symptoms. does not make urine. denies other symptoms.     ROS: No fever/chills. No eye pain/changes in vision, No ear pain/sore throat/dysphagia, No chest pain/palpitations. No SOB/cough/., no black/bloody bm. No dysuria/frequency/discharge, No headache. No Dizziness.    No rashes or breaks in skin. No numbness/tingling/weakness.

## 2021-03-01 NOTE — H&P ADULT - ASSESSMENT
59yo F with PMH of ESRD, CHF, and abdominal ascites presents with 1 day of abdominal pain and dark emesis found with a large strangulated ventral hernia that has overlying skin changes. Patient is currently stable, but will need urgent surgical intervention.     - Admit to surgery under Dr. Kellogg  - Plan for urgent OR for hernia repair, resection of Necrotic bowel, other indicated procedures.  - Light hydration  - IV ABX  - Type and screen, 2U pRBC on hold for OR  - NPO  - Will reach out to renal for HD needs.

## 2021-03-01 NOTE — ED PROVIDER NOTE - PHYSICAL EXAMINATION
Gen: mild distress. non toxic  HEENT: Mucous membranes moist, pink conjunctivae, EOMI  CV: RRR, nl s1/s2. left av fistula  Resp: CTAB, normal rate and effort  GI: large soft ventral hernia, reducible. no other focal ttp.   : No CVAT  Neuro: A&O x 3, moving all 4 extremities  MSK: No spine or joint tenderness to palpation  Skin: No rashes. intact and perfused. Gen: mild distress. non toxic  HEENT: Mucous membranes moist, pink conjunctivae, EOMI  CV: RRR, nl s1/s2. left av fistula  Resp: CTAB, normal rate and effort  GI: large soft ventral hernia, generalized tender, reduces but not all the way given size. no other focal ttp.   : No CVAT  Neuro: A&O x 3, moving all 4 extremities  MSK: No spine or joint tenderness to palpation  Skin: No rashes. intact and perfused.

## 2021-03-01 NOTE — ED ADULT TRIAGE NOTE - SPO2 (%)
finish course of Meropenem  cultures negative  possible aspiration pneumonia 98 finish course of Meropenem  cultures negative  possible aspiration pneumonia  S&S follow up  continue free water for hypernatremia

## 2021-03-01 NOTE — BRIEF OPERATIVE NOTE - NSICDXBRIEFPROCEDURE_GEN_ALL_CORE_FT
PROCEDURES:  Repair of ventral hernia without using mesh 01-Mar-2021 16:15:20  Jesús Jones  Small bowel resection with anastomosis 01-Mar-2021 16:15:05  Jesús Jones  Exploratory laparotomy 01-Mar-2021 16:14:06  Jesús Jones

## 2021-03-01 NOTE — H&P ADULT - HISTORY OF PRESENT ILLNESS
61 y/o female with PMH of ESRD on dialysis MWF (Last dialysis Friday) htn, hld, dm, and abdominal ascites (last paracentesis 2/9/21 for 4500 cc) who presents with complaints of 1 day of abd pain with vomiting. Patient has a large ventral hernia with pain and overlying skin discoloration. She reports having had this for months, and was going to get it repaired, but has not been able to. Pain radiates to back and is also associated with fever and chills. Denies chest pain or trouble breathing. No change sin bowel movements Patient reportedly vomited 200cc of dark emesis while in CT scan. CT shows significant ascites and large hernia, which appears strangulated and with closed loop obstruction.

## 2021-03-01 NOTE — ED ADULT NURSE NOTE - OBJECTIVE STATEMENT
pt came in c/o abd pain in LLQ and flank pain bilaterally.  pt stated she has been vomiting since yesterday and denied fever.  she is on dialysis m, w, f she was last dialyzed friday and has a left ue av fistula pos bruit/thrill.

## 2021-03-02 DIAGNOSIS — K56.609 UNSPECIFIED INTESTINAL OBSTRUCTION, UNSPECIFIED AS TO PARTIAL VERSUS COMPLETE OBSTRUCTION: ICD-10-CM

## 2021-03-02 DIAGNOSIS — N18.6 END STAGE RENAL DISEASE: ICD-10-CM

## 2021-03-02 LAB
A1C WITH ESTIMATED AVERAGE GLUCOSE RESULT: 8 % — HIGH (ref 4–5.6)
ANION GAP SERPL CALC-SCNC: 17 MMOL/L — SIGNIFICANT CHANGE UP (ref 5–17)
BUN SERPL-MCNC: 32 MG/DL — HIGH (ref 8–20)
CALCIUM SERPL-MCNC: 7.9 MG/DL — LOW (ref 8.6–10.2)
CHLORIDE SERPL-SCNC: 93 MMOL/L — LOW (ref 98–107)
CO2 SERPL-SCNC: 26 MMOL/L — SIGNIFICANT CHANGE UP (ref 22–29)
CREAT SERPL-MCNC: 3.81 MG/DL — HIGH (ref 0.5–1.3)
ESTIMATED AVERAGE GLUCOSE: 183 MG/DL — HIGH (ref 68–114)
GLUCOSE BLDC GLUCOMTR-MCNC: 107 MG/DL — HIGH (ref 70–99)
GLUCOSE BLDC GLUCOMTR-MCNC: 142 MG/DL — HIGH (ref 70–99)
GLUCOSE BLDC GLUCOMTR-MCNC: 192 MG/DL — HIGH (ref 70–99)
GLUCOSE BLDC GLUCOMTR-MCNC: 317 MG/DL — HIGH (ref 70–99)
GLUCOSE SERPL-MCNC: 178 MG/DL — HIGH (ref 70–99)
HCT VFR BLD CALC: 26.4 % — LOW (ref 34.5–45)
HGB BLD-MCNC: 8.2 G/DL — LOW (ref 11.5–15.5)
MAGNESIUM SERPL-MCNC: 2 MG/DL — SIGNIFICANT CHANGE UP (ref 1.6–2.6)
MCHC RBC-ENTMCNC: 30.5 PG — SIGNIFICANT CHANGE UP (ref 27–34)
MCHC RBC-ENTMCNC: 31.1 GM/DL — LOW (ref 32–36)
MCV RBC AUTO: 98.1 FL — SIGNIFICANT CHANGE UP (ref 80–100)
PHOSPHATE SERPL-MCNC: 5.3 MG/DL — HIGH (ref 2.4–4.7)
PLATELET # BLD AUTO: 154 K/UL — SIGNIFICANT CHANGE UP (ref 150–400)
POTASSIUM SERPL-MCNC: 5 MMOL/L — SIGNIFICANT CHANGE UP (ref 3.5–5.3)
POTASSIUM SERPL-SCNC: 5 MMOL/L — SIGNIFICANT CHANGE UP (ref 3.5–5.3)
RBC # BLD: 2.69 M/UL — LOW (ref 3.8–5.2)
RBC # FLD: 15.2 % — HIGH (ref 10.3–14.5)
SARS-COV-2 IGG SERPL QL IA: POSITIVE
SARS-COV-2 IGM SERPL IA-ACNC: 12.2 INDEX — HIGH
SODIUM SERPL-SCNC: 136 MMOL/L — SIGNIFICANT CHANGE UP (ref 135–145)
WBC # BLD: 4.27 K/UL — SIGNIFICANT CHANGE UP (ref 3.8–10.5)
WBC # FLD AUTO: 4.27 K/UL — SIGNIFICANT CHANGE UP (ref 3.8–10.5)

## 2021-03-02 RX ORDER — OXYCODONE HYDROCHLORIDE 5 MG/1
5 TABLET ORAL EVERY 4 HOURS
Refills: 0 | Status: DISCONTINUED | OUTPATIENT
Start: 2021-03-02 | End: 2021-03-03

## 2021-03-02 RX ORDER — HYDROMORPHONE HYDROCHLORIDE 2 MG/ML
0.5 INJECTION INTRAMUSCULAR; INTRAVENOUS; SUBCUTANEOUS EVERY 6 HOURS
Refills: 0 | Status: DISCONTINUED | OUTPATIENT
Start: 2021-03-02 | End: 2021-03-03

## 2021-03-02 RX ORDER — ERYTHROPOIETIN 10000 [IU]/ML
10000 INJECTION, SOLUTION INTRAVENOUS; SUBCUTANEOUS
Refills: 0 | Status: DISCONTINUED | OUTPATIENT
Start: 2021-03-03 | End: 2021-03-03

## 2021-03-02 RX ORDER — ACETAMINOPHEN 500 MG
1000 TABLET ORAL ONCE
Refills: 0 | Status: COMPLETED | OUTPATIENT
Start: 2021-03-02 | End: 2021-03-02

## 2021-03-02 RX ORDER — OXYCODONE HYDROCHLORIDE 5 MG/1
10 TABLET ORAL EVERY 4 HOURS
Refills: 0 | Status: DISCONTINUED | OUTPATIENT
Start: 2021-03-02 | End: 2021-03-03

## 2021-03-02 RX ORDER — INSULIN LISPRO 100/ML
VIAL (ML) SUBCUTANEOUS
Refills: 0 | Status: DISCONTINUED | OUTPATIENT
Start: 2021-03-02 | End: 2021-03-03

## 2021-03-02 RX ADMIN — HEPARIN SODIUM 5000 UNIT(S): 5000 INJECTION INTRAVENOUS; SUBCUTANEOUS at 22:41

## 2021-03-02 RX ADMIN — Medication 2: at 07:33

## 2021-03-02 RX ADMIN — Medication 8: at 11:30

## 2021-03-02 RX ADMIN — HEPARIN SODIUM 5000 UNIT(S): 5000 INJECTION INTRAVENOUS; SUBCUTANEOUS at 00:03

## 2021-03-02 RX ADMIN — ATORVASTATIN CALCIUM 10 MILLIGRAM(S): 80 TABLET, FILM COATED ORAL at 22:41

## 2021-03-02 RX ADMIN — HEPARIN SODIUM 5000 UNIT(S): 5000 INJECTION INTRAVENOUS; SUBCUTANEOUS at 07:34

## 2021-03-02 RX ADMIN — Medication 400 MILLIGRAM(S): at 00:59

## 2021-03-02 RX ADMIN — ATORVASTATIN CALCIUM 10 MILLIGRAM(S): 80 TABLET, FILM COATED ORAL at 00:02

## 2021-03-02 RX ADMIN — AMLODIPINE BESYLATE 10 MILLIGRAM(S): 2.5 TABLET ORAL at 05:15

## 2021-03-02 RX ADMIN — Medication 25 MILLIGRAM(S): at 16:27

## 2021-03-02 RX ADMIN — Medication 25 MILLIGRAM(S): at 05:15

## 2021-03-02 RX ADMIN — HEPARIN SODIUM 5000 UNIT(S): 5000 INJECTION INTRAVENOUS; SUBCUTANEOUS at 13:54

## 2021-03-02 NOTE — PROGRESS NOTE ADULT - SUBJECTIVE AND OBJECTIVE BOX
NEPHROLOGY INTERVAL HPI/OVERNIGHT EVENTS:    POD # 1   Surgery follow up noted   looks well;    PRE-OP DIAGNOSIS:  Ventral hernia with bowel obstruction 01-Mar-2021 16:17:04  Jesús Jones  Incarcerated umbilical hernia 01-Mar-2021 16:15:45  Jesús Jones.  ·  POST-OP DIAGNOSIS:  Strangulated ventral hernia 01-Mar-2021 16:17:57  Jesús Jones.  ·  PROCEDURES:  Repair of ventral hernia without using mesh 01-Mar-2021 16:15:20  Jesús Jones  Small bowel resection with anastomosis 01-Mar-2021 16:15:05  Jesús Jones  Exploratory laparotomy 01-Mar-2021 16:14:06  Jesús Jones.    MEDICATIONS  (STANDING):  amLODIPine   Tablet 10 milliGRAM(s) Oral daily  atorvastatin 10 milliGRAM(s) Oral at bedtime  dextrose 40% Gel 15 Gram(s) Oral once  dextrose 5%. 1000 milliLiter(s) (50 mL/Hr) IV Continuous <Continuous>  dextrose 5%. 1000 milliLiter(s) (100 mL/Hr) IV Continuous <Continuous>  dextrose 50% Injectable 25 Gram(s) IV Push once  dextrose 50% Injectable 12.5 Gram(s) IV Push once  dextrose 50% Injectable 25 Gram(s) IV Push once  glucagon  Injectable 1 milliGRAM(s) IntraMuscular once  heparin   Injectable 5000 Unit(s) SubCutaneous every 8 hours  insulin lispro (ADMELOG) corrective regimen sliding scale   SubCutaneous Before meals and at bedtime  metoprolol tartrate 25 milliGRAM(s) Oral two times a day    MEDICATIONS  (PRN):  HYDROmorphone  Injectable 0.5 milliGRAM(s) IV Push every 6 hours PRN break thru pain  oxyCODONE    IR 5 milliGRAM(s) Oral every 4 hours PRN Moderate Pain (4 - 6)  oxyCODONE    IR 10 milliGRAM(s) Oral every 4 hours PRN Severe Pain (7 - 10)      Allergies    eggs (Anaphylaxis)  No Known Drug Allergies    Intolerances        Vital Signs Last 24 Hrs  T(C): 36.7 (02 Mar 2021 11:54), Max: 37.2 (01 Mar 2021 17:26)  T(F): 98.1 (02 Mar 2021 11:54), Max: 99 (01 Mar 2021 17:26)  HR: 61 (02 Mar 2021 11:54) (55 - 70)  BP: 104/58 (02 Mar 2021 11:54) (104/58 - 169/88)  BP(mean): --  RR: 18 (02 Mar 2021 11:54) (15 - 23)  SpO2: 98% (02 Mar 2021 11:54) (93% - 100%)  Daily     Daily   I&O's Detail    01 Mar 2021 07:01  -  02 Mar 2021 07:00  --------------------------------------------------------  IN:    Lactated Ringers: 126 mL  Total IN: 126 mL    OUT:    Other (mL): 1000 mL  Total OUT: 1000 mL    Total NET: -874 mL        I&O's Summary    01 Mar 2021 07:01  -  02 Mar 2021 07:00  --------------------------------------------------------  IN: 126 mL / OUT: 1000 mL / NET: -874 mL        PHYSICAL EXAM:  GENERAL: laying flat , No SOB or CP   HEAD:  Atraumatic, anicteric   ENNECK: Supple,   NERVOUS SYSTEM:  Alert & Oriented X3,   CHEST/LUNG: EAE / Clear   HEART: Regular rate and rhythm; rub   ABDOMEN: dressed , soft   EXTREMITIES: + leg edema , Arm access with thrill       LABS:                        8.2    4.27  )-----------( 154      ( 02 Mar 2021 06:02 )             26.4     03-02    136  |  93<L>  |  32.0<H>  ----------------------------<  178<H>  5.0   |  26.0  |  3.81<H>    Ca    7.9<L>      02 Mar 2021 06:02  Phos  5.3     03-02  Mg     2.0     03-02    TPro  8.3  /  Alb  4.3  /  TBili  0.5  /  DBili  x   /  AST  33<H>  /  ALT  21  /  AlkPhos  284<H>  03-01    PT/INR - ( 01 Mar 2021 10:06 )   PT: 13.1 sec;   INR: 1.14 ratio         PTT - ( 01 Mar 2021 10:06 )  PTT:30.5 sec    Magnesium, Serum: 2.0 mg/dL (03-02 @ 06:02)  Phosphorus Level, Serum: 5.3 mg/dL (03-02 @ 06:02)          RADIOLOGY & ADDITIONAL TESTS:

## 2021-03-02 NOTE — PHYSICAL THERAPY INITIAL EVALUATION ADULT - DID THE PATIENT HAVE SURGERY?
s/p Repair of ventral hernia without using mesh; Small bowel resection with anastomosis;Exploratory laparotomy/yes

## 2021-03-02 NOTE — PHYSICAL THERAPY INITIAL EVALUATION ADULT - LEVEL OF INDEPENDENCE: STAIR NEGOTIATION, REHAB EVAL
pt deferred stair assessment at this time, reports she usually needs assistance with stairs at home, unsure if she'll able to perform at this time. Requesting stair training during next PT session./unable to perform

## 2021-03-02 NOTE — PROGRESS NOTE ADULT - PROBLEM SELECTOR PLAN 1
serial abdominal exams  monitor for return of bowel function   pain control  monitor PO tolerance  dvt ppx  incentive spirometer   scd  home meds  encourage ambulation

## 2021-03-02 NOTE — PHYSICAL THERAPY INITIAL EVALUATION ADULT - GENERAL OBSERVATIONS, REHAB EVAL
pt received semi still in bed + IV lock + VCBs + cardiac monitor + NC but breathing on RA, Ecuadorean speaking, with NAD and willing to participate with PT.

## 2021-03-02 NOTE — PHYSICAL THERAPY INITIAL EVALUATION ADULT - PERTINENT HX OF CURRENT PROBLEM, REHAB EVAL
59yo F with PMH of ESRD, CHF, and abdominal ascites presents with 1 day of abdominal pain and dark emesis found with a large strangulated ventral hernia that has overlying skin changes.

## 2021-03-02 NOTE — PROGRESS NOTE ADULT - SUBJECTIVE AND OBJECTIVE BOX
INTERVAL HPI/OVERNIGHT EVENTS:    STATUS POST:      POST OPERATIVE DAY #:     SUBJECTIVE:  Flatus: [ ] YES [ ] NO             Bowel Movement: [ ] YES [ ] NO  Pain (0-10):            Pain Control Adequate: [ ] YES [ ] NO  Nausea: [ ] YES [ ] NO            Vomiting: [ ] YES [ ] NO  Diarrhea: [ ] YES [ ] NO         Constipation: [ ] YES [ ] NO     Chest Pain: [ ] YES [ ] NO    SOB:  [ ] YES [ ] NO    MEDICATIONS  (STANDING):  amLODIPine   Tablet 10 milliGRAM(s) Oral daily  atorvastatin 10 milliGRAM(s) Oral at bedtime  dextrose 40% Gel 15 Gram(s) Oral once  dextrose 5%. 1000 milliLiter(s) (50 mL/Hr) IV Continuous <Continuous>  dextrose 5%. 1000 milliLiter(s) (100 mL/Hr) IV Continuous <Continuous>  dextrose 50% Injectable 25 Gram(s) IV Push once  dextrose 50% Injectable 12.5 Gram(s) IV Push once  dextrose 50% Injectable 25 Gram(s) IV Push once  glucagon  Injectable 1 milliGRAM(s) IntraMuscular once  heparin   Injectable 5000 Unit(s) SubCutaneous every 8 hours  insulin lispro (ADMELOG) corrective regimen sliding scale   SubCutaneous three times a day before meals  metoprolol tartrate 25 milliGRAM(s) Oral two times a day    MEDICATIONS  (PRN):      Vital Signs Last 24 Hrs  T(C): 36.3 (01 Mar 2021 22:35), Max: 37.2 (01 Mar 2021 17:26)  T(F): 97.3 (01 Mar 2021 22:35), Max: 99 (01 Mar 2021 17:26)  HR: 70 (01 Mar 2021 22:35) (55 - 80)  BP: 166/81 (01 Mar 2021 22:35) (151/72 - 211/97)  BP(mean): --  RR: 18 (01 Mar 2021 22:35) (15 - 23)  SpO2: 100% (01 Mar 2021 22:35) (93% - 100%)    PHYSICAL EXAM:      Constitutional:    Eyes:    ENMT:    Neck:    Breasts:    Back:    Respiratory:    Cardiovascular:    Gastrointestinal:    Genitourinary:    Rectal:    Extremities:    Vascular:    Neurological:    Skin:    Lymph Nodes:    Musculoskeletal:    Psychiatric:        I&O's Detail    01 Mar 2021 07:01  -  02 Mar 2021 00:24  --------------------------------------------------------  IN:    Lactated Ringers: 126 mL  Total IN: 126 mL    OUT:    Other (mL): 1000 mL  Total OUT: 1000 mL    Total NET: -874 mL          LABS:                        10.7   5.29  )-----------( 179      ( 01 Mar 2021 10:06 )             34.3     03-01    136  |  85<L>  |  69.0<H>  ----------------------------<  301<H>  5.7<H>   |  27.0  |  6.50<H>    Ca    8.7      01 Mar 2021 10:06    TPro  8.3  /  Alb  4.3  /  TBili  0.5  /  DBili  x   /  AST  33<H>  /  ALT  21  /  AlkPhos  284<H>  03-01    PT/INR - ( 01 Mar 2021 10:06 )   PT: 13.1 sec;   INR: 1.14 ratio         PTT - ( 01 Mar 2021 10:06 )  PTT:30.5 sec      RADIOLOGY & ADDITIONAL STUDIES: INTERVAL HPI/OVERNIGHT EVENTS: Patient now POD#1 of strangulated ventral hernia repair, no post-op events, had dialysis session with no issues. Patients pain controlled on current regimen, has no requests or complaints at this time.     STATUS POST:  Strangulated ventral hernia repair, operative findings  incarcerated and strangulated umbilical hernia with approximately 15 cm of necrotic small bowel- SBR with stapled anastomosis performed- hernia defect measured approximately 3 cm in diameter, closed primarily with 0 PDS in running fashion -skin re-approximated with staples    POST OPERATIVE DAY #: 1        MEDICATIONS  (STANDING):  amLODIPine   Tablet 10 milliGRAM(s) Oral daily  atorvastatin 10 milliGRAM(s) Oral at bedtime  dextrose 40% Gel 15 Gram(s) Oral once  dextrose 5%. 1000 milliLiter(s) (50 mL/Hr) IV Continuous <Continuous>  dextrose 5%. 1000 milliLiter(s) (100 mL/Hr) IV Continuous <Continuous>  dextrose 50% Injectable 25 Gram(s) IV Push once  dextrose 50% Injectable 12.5 Gram(s) IV Push once  dextrose 50% Injectable 25 Gram(s) IV Push once  glucagon  Injectable 1 milliGRAM(s) IntraMuscular once  heparin   Injectable 5000 Unit(s) SubCutaneous every 8 hours  insulin lispro (ADMELOG) corrective regimen sliding scale   SubCutaneous three times a day before meals  metoprolol tartrate 25 milliGRAM(s) Oral two times a day    MEDICATIONS  (PRN):      Vital Signs Last 24 Hrs  T(C): 36.3 (01 Mar 2021 22:35), Max: 37.2 (01 Mar 2021 17:26)  T(F): 97.3 (01 Mar 2021 22:35), Max: 99 (01 Mar 2021 17:26)  HR: 70 (01 Mar 2021 22:35) (55 - 80)  BP: 166/81 (01 Mar 2021 22:35) (151/72 - 211/97)  BP(mean): --  RR: 18 (01 Mar 2021 22:35) (15 - 23)  SpO2: 100% (01 Mar 2021 22:35) (93% - 100%)    PHYSICAL EXAM:      Constitutional: in good spirits     Respiratory: no respiratory distress, no conversational dyspnea, no supplemental o2 needed at this time     Gastrointestinal: abdomen softly distended, ex-lap surgical mid-line surgical dressing with mild bloody staining, no peritonitis, no guarding, mild ttp     Genitourinary: anuric     Vascular: LUE av fistula with palpable thrill     Neurological: A&OX3    Skin: warm, dry and no rashes             I&O's Detail    01 Mar 2021 07:01  -  02 Mar 2021 00:24  --------------------------------------------------------  IN:    Lactated Ringers: 126 mL  Total IN: 126 mL    OUT:    Other (mL): 1000 mL  Total OUT: 1000 mL    Total NET: -874 mL          LABS:                        10.7   5.29  )-----------( 179      ( 01 Mar 2021 10:06 )             34.3     03-01    136  |  85<L>  |  69.0<H>  ----------------------------<  301<H>  5.7<H>   |  27.0  |  6.50<H>    Ca    8.7      01 Mar 2021 10:06    TPro  8.3  /  Alb  4.3  /  TBili  0.5  /  DBili  x   /  AST  33<H>  /  ALT  21  /  AlkPhos  284<H>  03-01    PT/INR - ( 01 Mar 2021 10:06 )   PT: 13.1 sec;   INR: 1.14 ratio         PTT - ( 01 Mar 2021 10:06 )  PTT:30.5 sec      RADIOLOGY & ADDITIONAL STUDIES:

## 2021-03-03 ENCOUNTER — TRANSCRIPTION ENCOUNTER (OUTPATIENT)
Age: 61
End: 2021-03-03

## 2021-03-03 VITALS
RESPIRATION RATE: 18 BRPM | SYSTOLIC BLOOD PRESSURE: 146 MMHG | TEMPERATURE: 99 F | DIASTOLIC BLOOD PRESSURE: 70 MMHG | OXYGEN SATURATION: 93 % | HEART RATE: 68 BPM

## 2021-03-03 LAB
ANION GAP SERPL CALC-SCNC: 16 MMOL/L — SIGNIFICANT CHANGE UP (ref 5–17)
BASOPHILS # BLD AUTO: 0.03 K/UL — SIGNIFICANT CHANGE UP (ref 0–0.2)
BASOPHILS NFR BLD AUTO: 0.7 % — SIGNIFICANT CHANGE UP (ref 0–2)
BUN SERPL-MCNC: 62 MG/DL — HIGH (ref 8–20)
CALCIUM SERPL-MCNC: 7 MG/DL — LOW (ref 8.6–10.2)
CHLORIDE SERPL-SCNC: 88 MMOL/L — LOW (ref 98–107)
CO2 SERPL-SCNC: 25 MMOL/L — SIGNIFICANT CHANGE UP (ref 22–29)
CREAT SERPL-MCNC: 5.28 MG/DL — HIGH (ref 0.5–1.3)
EOSINOPHIL # BLD AUTO: 0.22 K/UL — SIGNIFICANT CHANGE UP (ref 0–0.5)
EOSINOPHIL NFR BLD AUTO: 5.1 % — SIGNIFICANT CHANGE UP (ref 0–6)
GLUCOSE BLDC GLUCOMTR-MCNC: 118 MG/DL — HIGH (ref 70–99)
GLUCOSE BLDC GLUCOMTR-MCNC: 123 MG/DL — HIGH (ref 70–99)
GLUCOSE BLDC GLUCOMTR-MCNC: 234 MG/DL — HIGH (ref 70–99)
GLUCOSE SERPL-MCNC: 113 MG/DL — HIGH (ref 70–99)
HCT VFR BLD CALC: 26.9 % — LOW (ref 34.5–45)
HGB BLD-MCNC: 8.3 G/DL — LOW (ref 11.5–15.5)
IMM GRANULOCYTES NFR BLD AUTO: 0.2 % — SIGNIFICANT CHANGE UP (ref 0–1.5)
LYMPHOCYTES # BLD AUTO: 0.92 K/UL — LOW (ref 1–3.3)
LYMPHOCYTES # BLD AUTO: 21.2 % — SIGNIFICANT CHANGE UP (ref 13–44)
MAGNESIUM SERPL-MCNC: 2 MG/DL — SIGNIFICANT CHANGE UP (ref 1.6–2.6)
MCHC RBC-ENTMCNC: 30 PG — SIGNIFICANT CHANGE UP (ref 27–34)
MCHC RBC-ENTMCNC: 30.9 GM/DL — LOW (ref 32–36)
MCV RBC AUTO: 97.1 FL — SIGNIFICANT CHANGE UP (ref 80–100)
MONOCYTES # BLD AUTO: 0.39 K/UL — SIGNIFICANT CHANGE UP (ref 0–0.9)
MONOCYTES NFR BLD AUTO: 9 % — SIGNIFICANT CHANGE UP (ref 2–14)
NEUTROPHILS # BLD AUTO: 2.76 K/UL — SIGNIFICANT CHANGE UP (ref 1.8–7.4)
NEUTROPHILS NFR BLD AUTO: 63.8 % — SIGNIFICANT CHANGE UP (ref 43–77)
PHOSPHATE SERPL-MCNC: 6.5 MG/DL — HIGH (ref 2.4–4.7)
PLATELET # BLD AUTO: 182 K/UL — SIGNIFICANT CHANGE UP (ref 150–400)
POTASSIUM SERPL-MCNC: 5.4 MMOL/L — HIGH (ref 3.5–5.3)
POTASSIUM SERPL-SCNC: 5.4 MMOL/L — HIGH (ref 3.5–5.3)
RBC # BLD: 2.77 M/UL — LOW (ref 3.8–5.2)
RBC # FLD: 15.5 % — HIGH (ref 10.3–14.5)
SODIUM SERPL-SCNC: 129 MMOL/L — LOW (ref 135–145)
WBC # BLD: 4.33 K/UL — SIGNIFICANT CHANGE UP (ref 3.8–10.5)
WBC # FLD AUTO: 4.33 K/UL — SIGNIFICANT CHANGE UP (ref 3.8–10.5)

## 2021-03-03 RX ORDER — TRAMADOL HYDROCHLORIDE 50 MG/1
1 TABLET ORAL
Qty: 12 | Refills: 0
Start: 2021-03-03 | End: 2021-03-05

## 2021-03-03 RX ADMIN — Medication 4: at 16:36

## 2021-03-03 RX ADMIN — Medication 25 MILLIGRAM(S): at 05:55

## 2021-03-03 RX ADMIN — AMLODIPINE BESYLATE 10 MILLIGRAM(S): 2.5 TABLET ORAL at 05:55

## 2021-03-03 RX ADMIN — HEPARIN SODIUM 5000 UNIT(S): 5000 INJECTION INTRAVENOUS; SUBCUTANEOUS at 16:37

## 2021-03-03 RX ADMIN — Medication 25 MILLIGRAM(S): at 16:37

## 2021-03-03 RX ADMIN — ERYTHROPOIETIN 10000 UNIT(S): 10000 INJECTION, SOLUTION INTRAVENOUS; SUBCUTANEOUS at 09:26

## 2021-03-03 NOTE — DISCHARGE NOTE PROVIDER - HOSPITAL COURSE
HPI:  61 y/o female with PMH of ESRD on dialysis MWF (Last dialysis Friday) htn, hld, dm, and abdominal ascites (last paracentesis 2/9/21 for 4500 cc) who presents with complaints of 1 day of abd pain with vomiting. Patient has a large ventral hernia with pain and overlying skin discoloration. She reports having had this for months, and was going to get it repaired, but has not been able to. Pain radiates to back and is also associated with fever and chills. Denies chest pain or trouble breathing. No change sin bowel movements Patient reportedly vomited 200cc of dark emesis while in CT scan. CT shows significant ascites and large hernia, which appears strangulated and with closed loop obstruction.     Hospital Course  Patient was admitted and taken urgently to the OR for an ex-lap, reduction of hernia, and small bowel resection. Post operatively, patient was tolerating a diet, having bowel function. She also received dialysis and her pain was controlled.     On d/c, patient's pain is well controlled, she is ambulating, voiding, and tolerating a diet at baseline.

## 2021-03-03 NOTE — PROGRESS NOTE ADULT - SUBJECTIVE AND OBJECTIVE BOX
NEPHROLOGY INTERVAL HPI/OVERNIGHT EVENTS:  pt seen earlier at HD  No acute distress  Tolerating diet    MEDICATIONS  (STANDING):  amLODIPine   Tablet 10 milliGRAM(s) Oral daily  atorvastatin 10 milliGRAM(s) Oral at bedtime  dextrose 40% Gel 15 Gram(s) Oral once  dextrose 5%. 1000 milliLiter(s) (50 mL/Hr) IV Continuous <Continuous>  dextrose 5%. 1000 milliLiter(s) (100 mL/Hr) IV Continuous <Continuous>  dextrose 50% Injectable 25 Gram(s) IV Push once  dextrose 50% Injectable 12.5 Gram(s) IV Push once  dextrose 50% Injectable 25 Gram(s) IV Push once  epoetin gian-epbx (RETACRIT) Injectable 73731 Unit(s) IV Push <User Schedule>  glucagon  Injectable 1 milliGRAM(s) IntraMuscular once  heparin   Injectable 5000 Unit(s) SubCutaneous every 8 hours  insulin lispro (ADMELOG) corrective regimen sliding scale   SubCutaneous Before meals and at bedtime  metoprolol tartrate 25 milliGRAM(s) Oral two times a day    MEDICATIONS  (PRN):  HYDROmorphone  Injectable 0.5 milliGRAM(s) IV Push every 6 hours PRN break thru pain  oxyCODONE    IR 5 milliGRAM(s) Oral every 4 hours PRN Moderate Pain (4 - 6)  oxyCODONE    IR 10 milliGRAM(s) Oral every 4 hours PRN Severe Pain (7 - 10)      Allergies    eggs (Anaphylaxis)  No Known Drug Allergies          Vital Signs Last 24 Hrs  T(C): 37 (03 Mar 2021 07:35), Max: 37 (03 Mar 2021 07:35)  T(F): 98.6 (03 Mar 2021 07:35), Max: 98.6 (03 Mar 2021 07:35)  HR: 60 (03 Mar 2021 07:35) (60 - 63)  BP: 120/66 (03 Mar 2021 07:35) (104/58 - 132/74)  BP(mean): --  RR: 18 (03 Mar 2021 07:35) (18 - 18)  SpO2: 91% (03 Mar 2021 07:35) (91% - 98%)    PHYSICAL EXAM:  GENERAL: Weak, tired   HEAD: Dry mucous membranes  NERVOUS SYSTEM:  Alert & Oriented X3, non focal  CHEST/LUNG: Clear bilaterally  HEART: No rub  ABDOMEN: soft +NT +BS  EXTREMITIES: +LE edema    LABS:                        8.3    4.33  )-----------( 182      ( 03 Mar 2021 06:38 )             26.9     03-03    129<L>  |  88<L>  |  62.0<H>  ----------------------------<  113<H>  5.4<H>   |  25.0  |  5.28<H>    Ca    7.0<L>      03 Mar 2021 06:38  Phos  6.5     03-03  Mg     2.0     03-03    TPro  8.3  /  Alb  4.3  /  TBili  0.5  /  DBili  x   /  AST  33<H>  /  ALT  21  /  AlkPhos  284<H>  03-01    PT/INR - ( 01 Mar 2021 10:06 )   PT: 13.1 sec;   INR: 1.14 ratio         PTT - ( 01 Mar 2021 10:06 )  PTT:30.5 sec    Magnesium, Serum: 2.0 mg/dL (03-03 @ 06:38)  Phosphorus Level, Serum: 6.5 mg/dL (03-03 @ 06:38)      RADIOLOGY & ADDITIONAL TESTS:

## 2021-03-03 NOTE — DISCHARGE NOTE NURSING/CASE MANAGEMENT/SOCIAL WORK - PATIENT PORTAL LINK FT
You can access the FollowMyHealth Patient Portal offered by [facility] by registering at the following website: [email or web address]. By joining [employer]’s FollowMyHealth portal, you will also be able to view your health information using other applications (apps) compatible with our system.

## 2021-03-03 NOTE — DISCHARGE NOTE NURSING/CASE MANAGEMENT/SOCIAL WORK - NURSING SECTION COMPLETE
Chief Complaint   Patient presents with     Infusion     here for infusion of IV ABX HX: ALL       
Patient/Caregiver provided printed discharge information.

## 2021-03-03 NOTE — PROGRESS NOTE ADULT - SUBJECTIVE AND OBJECTIVE BOX
INTERVAL HPI/OVERNIGHT EVENTS:    Patient seen this AM with no acute events overnight. Patient without any acute complaints at this time. Patients' pain is well controlled on current pain regiment. Tolerating clear liquid diet without any n/v. Denies bowel movement at this time. Remains hemodynamically stable and denies fevers, chills. No CP or SOB       MEDICATIONS  (STANDING):  amLODIPine   Tablet 10 milliGRAM(s) Oral daily  atorvastatin 10 milliGRAM(s) Oral at bedtime  dextrose 40% Gel 15 Gram(s) Oral once  dextrose 5%. 1000 milliLiter(s) (50 mL/Hr) IV Continuous <Continuous>  dextrose 5%. 1000 milliLiter(s) (100 mL/Hr) IV Continuous <Continuous>  dextrose 50% Injectable 25 Gram(s) IV Push once  dextrose 50% Injectable 12.5 Gram(s) IV Push once  dextrose 50% Injectable 25 Gram(s) IV Push once  epoetin gian-epbx (RETACRIT) Injectable 61672 Unit(s) IV Push <User Schedule>  glucagon  Injectable 1 milliGRAM(s) IntraMuscular once  heparin   Injectable 5000 Unit(s) SubCutaneous every 8 hours  insulin lispro (ADMELOG) corrective regimen sliding scale   SubCutaneous Before meals and at bedtime  metoprolol tartrate 25 milliGRAM(s) Oral two times a day    MEDICATIONS  (PRN):  HYDROmorphone  Injectable 0.5 milliGRAM(s) IV Push every 6 hours PRN break thru pain  oxyCODONE    IR 5 milliGRAM(s) Oral every 4 hours PRN Moderate Pain (4 - 6)  oxyCODONE    IR 10 milliGRAM(s) Oral every 4 hours PRN Severe Pain (7 - 10)      Vital Signs Last 24 Hrs  T(C): 36.7 (03 Mar 2021 05:02), Max: 36.7 (02 Mar 2021 11:54)  T(F): 98.1 (03 Mar 2021 05:02), Max: 98.1 (02 Mar 2021 11:54)  HR: 63 (03 Mar 2021 05:02) (60 - 63)  BP: 132/74 (03 Mar 2021 05:02) (104/58 - 132/74)  BP(mean): --  RR: 18 (03 Mar 2021 05:02) (18 - 18)  SpO2: 94% (03 Mar 2021 05:02) (92% - 98%)    Physical Exam:  GEN: NAD, laying in bed, appears stated age  HEENT: NCAT, clear oral mucosa, normal conjunctiva  Chest: non-tender  CV:  non-tachycardic, 2+ radial pulse  Pulm: no increased work of breathing, no conversational dyspnea  GI: soft, mild midline tenderness. Strikethrough of dressing. changed at bedside  MSK: moving all extremities       I&O's Detail    01 Mar 2021 07:01  -  02 Mar 2021 07:00  --------------------------------------------------------  IN:    Lactated Ringers: 126 mL  Total IN: 126 mL    OUT:    Other (mL): 1000 mL  Total OUT: 1000 mL    Total NET: -874 mL          LABS:                        8.2    4.27  )-----------( 154      ( 02 Mar 2021 06:02 )             26.4     03-02    136  |  93<L>  |  32.0<H>  ----------------------------<  178<H>  5.0   |  26.0  |  3.81<H>    Ca    7.9<L>      02 Mar 2021 06:02  Phos  5.3     03-02  Mg     2.0     03-02    TPro  8.3  /  Alb  4.3  /  TBili  0.5  /  DBili  x   /  AST  33<H>  /  ALT  21  /  AlkPhos  284<H>  03-01    PT/INR - ( 01 Mar 2021 10:06 )   PT: 13.1 sec;   INR: 1.14 ratio         PTT - ( 01 Mar 2021 10:06 )  PTT:30.5 sec      RADIOLOGY & ADDITIONAL STUDIES:

## 2021-03-03 NOTE — DISCHARGE NOTE PROVIDER - NSDCMRMEDTOKEN_GEN_ALL_CORE_FT
alendronate 70 mg oral tablet: 1 tab(s) orally once a week  amLODIPine 10 mg oral tablet: 1 tab(s) orally once a day  aspirin 81 mg oral delayed release tablet: 1 tab(s) orally once a day  atorvastatin 10 mg oral tablet: 1 tab(s) orally once a day  HumaLOG 100 units/mL subcutaneous solution: 10 unit(s) subcutaneous 2 times a day  hydrALAZINE 25 mg oral tablet: 1 tab(s) orally once a day  lisinopril 40 mg oral tablet: 1 tab(s) orally once a day  metoprolol succinate 50 mg oral tablet, extended release: 1 tab(s) orally once a day  traMADol 50 mg oral tablet: 1 tab(s) orally every 6 hours MDD:4tab

## 2021-03-03 NOTE — DISCHARGE NOTE PROVIDER - CARE PROVIDER_API CALL
Elian Kellogg (MD)  Surgery  [address], [address]  [place] [address]  Phone: [phone]  Fax: [phone]  Follow Up Time: 2 weeks

## 2021-03-03 NOTE — DISCHARGE NOTE PROVIDER - NSDCCPCAREPLAN_GEN_ALL_CORE_FT
PRINCIPAL DISCHARGE DIAGNOSIS  Diagnosis: Bowel obstruction  Assessment and Plan of Treatment: Follow up: Please call and make an appointment with Dr. Kellogg in 10-14 days. Also, please call and make an appointment with your primary care physician as per your usual schedule.   Activity: May return to normal activities as tolerated, however refrain from heavy lifting > 10-15 lbs.  You can resume dialysis.  Diet: May continue regular diet.  Medications: Please take all home medications as prescribed by your primary care doctor. Pain medication has been prescribed for you. Please, take it as it has been prescribed, do not drive or operate heavy machinery while taking narcotics.  You are encouraged to take over-the-counter tylenol and/or ibuprofen for pain relief when you feel your pain no longer warrants the use of narcotic pain medications.  Wound Care: Please, keep wound site clean and dry. You may shower, but do not bathe. You can leave it open to air. If there is some drainage, then you can cover it with some gauze and tape  Patient is advised to RETURN TO THE EMERGENCY DEPARTMENT for any of the following - worsening pain, fever/chills, nausea/vomiting, altered mental status, chest pain, shortness of breath, or any other new / worsening symptom.         PRINCIPAL DISCHARGE DIAGNOSIS  Diagnosis: Bowel obstruction  Assessment and Plan of Treatment: Follow up: Please call and make an appointment with Dr. Kellogg in 10-14 days. Also, please call and make an appointment with your primary care physician as per your usual schedule.   Activity: May return to normal activities as tolerated, however refrain from heavy lifting > 10-15 lbs.  You can resume dialysis.  Diet: May continue regular diet.  Medications: Please take all home medications as prescribed by your primary care doctor. Pain medication has been prescribed for you. Please, take it as it has been prescribed, do not drive or operate heavy machinery while taking narcotics.  You are encouraged to take over-the-counter tylenol for pain relief when you feel your pain no longer warrants the use of narcotic pain medications.  Wound Care: Please, keep wound site clean and dry. You may shower, but do not bathe. You can leave it open to air. If there is some drainage, then you can cover it with some gauze and tape  Patient is advised to RETURN TO THE EMERGENCY DEPARTMENT for any of the following - worsening pain, fever/chills, nausea/vomiting, altered mental status, chest pain, shortness of breath, or any other new / worsening symptom.

## 2021-03-03 NOTE — PROGRESS NOTE ADULT - ASSESSMENT
ESRD: POD # 2   Repair of ventral hernia without using mesh   Small bowel resection with anastomosis   Exploratory laparotomy   - HD today    Anemia:  - MARGI with HD   - PRBCs as needed to keep Hgb > 7.0    RO:  - low phos diet  - restart oral binders when fully taking PO
ESRD   POD # 1   Doing well     Repair of ventral hernia without using mesh 01-Mar-2021 16:15:20    Small bowel resection with anastomosis 01-Mar-2021 16:15:05    Exploratory laparotomy 01-Mar-2021 16:14:06      HD tomorrow       Anemia - Epogen with HD   CBC in am   Transfuse with HD as needed prn Hgb < 7     RO - Resume binders when full po     HTN - watch on meds     
Patient is a 60 year old female who presents with strangulated ventral hernia with ischemic bowel s/p small bowel resection and primary repair of hernia on 3/1.   - ESRD on HD (MWF)  - DM on ISS  Afib: continue home medications  Continue home medications  IS,OOB  Diet CLD, will advance with return of bowel function  Bowel Regimen  Nephrology following, due for HD  DVT ppx   No further antibiotics  PT/OT: home with assist

## 2021-03-04 LAB — SURGICAL PATHOLOGY STUDY: SIGNIFICANT CHANGE UP

## 2021-03-10 NOTE — DIETITIAN INITIAL EVALUATION ADULT. - ADHERENCE
Please call patient to reschedule appt that he cx on 3/10 with Dr. Vidales.  
lvm for patient to call for rs of cxl 3/10 appt  
fair

## 2021-03-11 ENCOUNTER — EMERGENCY (EMERGENCY)
Facility: HOSPITAL | Age: 61
LOS: 1 days | Discharge: HOME | End: 2021-03-11
Attending: EMERGENCY MEDICINE
Payer: MEDICARE

## 2021-03-11 VITALS
HEIGHT: 63 IN | HEART RATE: 77 BPM | OXYGEN SATURATION: 97 % | SYSTOLIC BLOOD PRESSURE: 181 MMHG | TEMPERATURE: 99 F | DIASTOLIC BLOOD PRESSURE: 92 MMHG | RESPIRATION RATE: 18 BRPM

## 2021-03-11 DIAGNOSIS — Z49.01 ENCOUNTER FOR FITTING AND ADJUSTMENT OF EXTRACORPOREAL DIALYSIS CATHETER: Chronic | ICD-10-CM

## 2021-03-11 DIAGNOSIS — I77.0 ARTERIOVENOUS FISTULA, ACQUIRED: Chronic | ICD-10-CM

## 2021-03-11 PROCEDURE — 99283 EMERGENCY DEPT VISIT LOW MDM: CPT | Mod: GC

## 2021-03-11 PROCEDURE — 99284 EMERGENCY DEPT VISIT MOD MDM: CPT

## 2021-03-11 NOTE — ED PROVIDER NOTE - CLINICAL SUMMARY MEDICAL DECISION MAKING FREE TEXT BOX
59yo F w/pmh ESRD on dialysis MWF, htn, hld, dm, abdominal ascites requiring paracentesis, incarcerated strangulated umbilical hernia s/p ex lap, hernia reduction, resection of 15cm small bowel on 3/1/21 presents for wound check. Midline incision closed with staples, no erythema, drainage, or warmth. No active bleeding. Will consult surgery. 61yo F w/pmh ESRD on dialysis MWF, htn, hld, dm, abdominal ascites requiring paracentesis, incarcerated strangulated umbilical hernia s/p ex lap, hernia reduction, resection of 15cm small bowel on 3/1/21 presents for wound check. Midline incision closed with staples, no erythema, drainage, or warmth. No active bleeding. Consulted surgery for evaluation of their post-op patient. Based on thorough history and benign physical exam, no further workup deemed necessary at this time.

## 2021-03-11 NOTE — CONSULT NOTE ADULT - SUBJECTIVE AND OBJECTIVE BOX
Surgery consult called for this patient who is POD 10 for ex lap, 15cm SBR, and ventral hernia repair after found with strangulated umbilical hernia. Sen and examined at bedside, Well in no acute distress. Denies fevers, chills, nausea, vomiting chest pain, or SOB. No dizziness, headache, or light-headedness.     Incision clean, nonerythematous with no active bleeding. Small hematoma at bottom third of incision. Staples in place.   Hemodynamically stable.    Patient okay for discharge with plan for follow up with Dr. Kellogg as before.    Full Consult note to follow      Plan discussed with Dr. Kellogg who agrees.    ACUTE CARE SURGERY CONSULT    HPI: 61yo F w/pmh ESRD on dialysis MWF, htn, hld, dm, abdominal ascites requiring paracentesis, and is POD 10 for ex lap, 15cm SBR, and ventral hernia repair after found with strangulated umbilical hernia. Seen and examined at bedside, Consulted for bleeding from incision. She appears well with no other complaints. reports she awoke this morning with a single 4x4 piece of gauze soaked in blood. No blood on beddding or clothing. This happed once before when first discharged from hospital. Denies fevers, chills, nausea, vomiting chest pain, or SOB. No dizziness, headache, or light-headedness. Is compliant with HD meds and get BP checks with dialysis. Tolerating a diet, having bowel movements without blood, and passing flatus.         PAST MEDICAL HISTORY:  End stage renal disease    Diabetes    Coronary artery disease, angina presence unspecified, unspecified vessel or lesion type, unspecified whether native or transplanted heart    Paroxysmal atrial fibrillation    Anemia due to chronic kidney disease, unspecified CKD stage    Chronic systolic congestive heart failure    Pleural effusion    Pericardial effusion    End stage renal disease on dialysis    HLD (hyperlipidemia)    HTN (hypertension)    DM (diabetes mellitus)        PAST SURGICAL HISTORY:  A-V fistula    A-V fistula    Encounter for dialysis catheter care    No significant past surgical history        ALLERGIES:  eggs (Anaphylaxis)  No Known Drug Allergies      FAMILY HISTORY: Noncontributory    SOCIAL HISTORY: Denies tobacco, EtOH, illicit substance use.     HOME MEDICATIONS:    MEDICATIONS  (STANDING):    MEDICATIONS  (PRN):      VITALS & I/Os:  Vital Signs Last 24 Hrs  T(C): 37.1 (11 Mar 2021 08:58), Max: 37.1 (11 Mar 2021 08:58)  T(F): 98.8 (11 Mar 2021 08:58), Max: 98.8 (11 Mar 2021 08:58)  HR: 77 (11 Mar 2021 08:58) (77 - 77)  BP: 181/92 (11 Mar 2021 08:58) (181/92 - 181/92)  BP(mean): --  RR: 18 (11 Mar 2021 08:58) (18 - 18)  SpO2: 97% (11 Mar 2021 08:58) (97% - 97%)  CAPILLARY BLOOD GLUCOSE          GENERAL: Alert, well developed, in no acute distress.  RESPIRATORY: Unlabored, no conversational dyspnea.  CARDIOVASCULAR: RRR.   GASTROINTESTINAL: Abdomen soft, NT, ND, -R/-G.  Midline incision clean, nonerythematous with no active bleeding. Small hematoma at bottom third of incision. Staples in place. No discharge or expressible fluid or purulence.

## 2021-03-11 NOTE — ED ADULT TRIAGE NOTE - CHIEF COMPLAINT QUOTE
Patient BIBA from home, pt had lump removed from her abdomen 8 days ago, pt states that she noticed a small amount of oozing from the site and would like to have the incision site checked. Pt denies any pain.

## 2021-03-11 NOTE — ED PROVIDER NOTE - CARE PROVIDER_API CALL
Elian Kellogg)  Surgery  [address], [address]  [place] [address]  Phone: [phone]  Fax: [phone]  Follow Up Time:

## 2021-03-11 NOTE — ED PROVIDER NOTE - ATTENDING CONTRIBUTION TO CARE
reports blood and drainage on surgical dressing this morning; cleaned up and no further staining noted.  Reports no other symtpoms. denies fever/ pain.  PE:  wound C/D/I.  Surgery consulted for post-op evaluation

## 2021-03-11 NOTE — ED PROVIDER NOTE - OBJECTIVE STATEMENT
59yo F w/pmh ESRD on dialysis MWF, htn, hld, dm, abdominal ascites requiring paracentesis, incarcerated strangulated umbilical hernia s/p ex lap, hernia reduction, resection of 15cm small bowel on 3/1/21 presents for wound check. Reports 61yo F w/pmh ESRD on dialysis MWF, htn, hld, dm, abdominal ascites requiring paracentesis, incarcerated strangulated umbilical hernia s/p ex lap, hernia reduction, resection of 15cm small bowel on 3/1/21 presents for wound check. Reports she woke up and the bandage over her incision was soaked with blood, but the bleeding had already stopped. Reports this is the second time this happened, first time 3d ago. Denies f/ch, increase in abdominal pain, n/v, erythema around the wound, pus from the wound. Denies any symptoms currently. Has a visiting nurse change her dressing at home.

## 2021-03-11 NOTE — ED PROVIDER NOTE - PATIENT PORTAL LINK FT
You can access the FollowMyHealth Patient Portal offered by [facility] by registering at the following website: [email or web address]. By joining [employer]’s FollowMyHealth portal, you will also be able to view your health information using other applications (apps) compatible with our system.

## 2021-03-11 NOTE — ED PROVIDER NOTE - NSFOLLOWUPINSTRUCTIONS_ED_ALL_ED_FT
1. Renetta un seguimiento con munoz cirujano a principios de la próxima semana.  2. Regrese al servicio de urgencias por cualquier síntoma nuevo o que empeore, barron fiebre, escalofríos, náuseas, vómitos, pus de la herida.    Cuidado de las heridas, en adultos    Wound Care, Adult      El cuidado correcto de las heridas puede ayudar a evitar el dolor y a prevenir las infecciones y formación de cicatrices. Además, puede ayudar a que la cicatrización sea más rápida. Siga las instrucciones del médico acerca del cuidado de la herida.      Materiales necesarios:    •Agua y jabón.      •Limpiador de heridas.      •Gasa.      •Si es necesario, violet venda limpia (vendaje) u otro tipo de material de vendaje para cubrir o colocar en la herida. Siga las instrucciones del médico acerca de qué materiales de vendaje usar.      •Crema o pomada para aplicar en la herida, si se lo indicó el médico.        Cómo cuidar la herida    Limpieza de la herida   Pregúntele al médico cómo limpiar la herida. Tomales puede incluir:  •Usar agua y jabón suave o un limpiador de heridas.      •Usar violet gasa limpia para secar la herida dando palmaditas después de limpiarla. No se frote ni restriegue la herida.      Cuidado del vendaje    •Lávese las janak con agua y jabón kristin al menos 20 segundos antes y después de cambiar el vendaje. Use desinfectante para janak si no dispone de agua y jabón.    •Cambie el vendaje barron se lo haya indicado el médico. Tomales puede incluir:  •Limpieza o enjuague (irrigación) de la herida.      •Colocar un vendaje sobre la herida o dentro de la herida (taponamiento).      •Cubrir la herida con un vendaje externo.        •No retire los puntos (suturas), la goma para cerrar la piel o las tiras adhesivas. Es posible que estos cierres cutáneos deban quedar puestos en la piel kristin 2 semanas o más tiempo. Si los bordes de las tiras adhesivas empiezan a despegarse y enroscarse, puede recortar los que estén sueltos. No retire las tiras adhesivas por completo a menos que el médico se lo indique.      •Pregúntele al médico cuándo puede dejar la herida al descubierto.        Detección de infección  Controle la radha de la herida todos los días para detectar signos de infección. Esté atento a los siguientes signos:  •Aumento del enrojecimiento, la hinchazón o el dolor.      •Líquido o tawanda.      •Calor.      •Pus o mal olor.        Sigue estas instrucciones en tu casa    Medicamentos     •Si le recetaron un medicamento, violet crema o un ungüento con antibiótico, tómelo o aplíqueselo barron se lo haya indicado el médico. No deje de usar el antibiótico aunque la afección mejore.      •Si le recetaron un analgésico, tómelo 30 minutos antes de realizar el cuidado de la herida, barron se lo haya indicado el médico.      •Cowlic los medicamentos de venta nuvia y los recetados solamente barron se lo haya indicado el médico.      Comida y bebida   •Siga violet dieta que incluya proteínas, vitaminas A y C y otros alimentos ricos en nutrientes que ayudarán a que la herida cicatrice.  •Los alimentos ricos en proteína incluyen carne, pescado, huevos, productos lácteos, frijoles y eleno secos.      •Los alimentos ricos en vitamina A incluyen zanahorias y verduras de hoja vinnie oscuro.      •Los alimentos ricos en vitamina C incluyen cítricos, tomates, brócoli y pimientos.        •Carrie suficiente líquido barron para mantener la orina de color amarillo pálido.      Instrucciones generales     • No tome enmanuel de inmersión, nade, use el jacuzzi ni renetta ninguna actividad en la que sumerja la herida en agua hasta que el médico lo autorice. Pregúntele al médico si puede ducharse. Hong vez solo le permitan darse enmanuel de esponja.      • No se rasque ni se toque la herida. Manténgala cubierta hong barron se lo haya indicado el médico.      •Retome chencho actividades normales según lo indicado por el médico. Pregúntele al médico qué actividades son seguras para usted.      •Proteja la herida del sol kristin los primeros 6 meses o kristin el tiempo que le haya indicado el médico. Cubra la radha de la cicatriz o aplíquele pantalla solar con un factor de protección solar (FPS) de al menos 30.      • No consuma ningún producto que contenga nicotina o tabaco, barron cigarrillos, cigarrillos electrónicos y tabaco de mascar. El consumo de esos productos puede demorar la cicatrización de la herida. Si necesita ayuda para dejar de fumar, consulte al médico.      •Concurra a todas las visitas de seguimiento barron se lo haya indicado el médico. Tomales es importante.        Comuníquese con un médico si:    •Le aplicaron la antitetánica y tiene hinchazón, dolor intenso, enrojecimiento o hemorragia en el sitio de la inyección.      •El dolor no se shirley con los medicamentos.    •Tiene cualquiera de estos signos de infección:  •Más enrojecimiento, hinchazón o dolor alrededor de la herida.      •Líquido o tawanda que sale de la herida.      •Calor que proviene de la herida.      •Advierte pus o mal olor que provienen de la herida.      •Fiebre o escalofríos.        •Siente náuseas o vomita.      •Tiene mareos.        Solicite ayuda de inmediato si:    •Tiene violet línea oren en la piel cerca de la radha que rodea la herida.      •La herida fue cerrada con grapas, suturas, goma para cerrar la piel o tiras adhesivas y estas comienzan a abrirse y a separarse.      •La herida sangra y el sangrado no se detiene con violet presión suave.      •Tiene violet erupción cutánea.      •Se desmaya.      •Tiene dificultad para respirar.      Estos síntomas pueden representar un problema grave que constituye violet emergencia. No espere a kit si los síntomas desaparecen. Solicite atención médica de inmediato. Comuníquese con el servicio de emergencias de munoz localidad (911 en los [place]). No conduzca por chencho propios medios hasta el hospital.       Resumen    •Siempre lávese las janak con agua y jabón kristin al menos 20 segundos antes y después de cambiar el vendaje.      •Cambie el vendaje barron se lo haya indicado el médico.      •Para ayudar con la cicatrización, coma alimentos ricos en proteínas, vitaminas A y C y demás nutrientes.      •Controle la herida todos los días para detectar signos de infección. Comuníquese con el médico si sospecha que la herida está infectada.      Esta información no tiene barron fin reemplazar el consejo del médico. Asegúrese de hacerle al médico cualquier pregunta que tenga.

## 2021-03-11 NOTE — ED PROVIDER NOTE - PROGRESS NOTE DETAILS
Spoke to surgery, based on a thorough history and physical exam, they agree that no further workup is necessary at this time. Patient to follow up outpatient at her 2-week f/u appointment early next week.

## 2021-03-11 NOTE — ED PROVIDER NOTE - PHYSICAL EXAMINATION
General: well appearing, NAD  Head: NC/AT  Cardiac: RRR, no m/r/g  Respiratory: CTABL, equal chest wall expansions, no conversational dyspnea  Abdomen: soft, ND, NT. Midline incision closed with staples, no erythema, drainage, or warmth. Small areas of ecchymosis inferior and lateral to the site. Minimal blood on bandage. No active bleeding.  Neuro: AAOx3,coordinated movement of all 4 extremities  Psych: calm, cooperative, normal affect  Skin: warm and dry

## 2021-03-11 NOTE — CONSULT NOTE ADULT - ASSESSMENT
61yo F who is POD 10 for ex lap, 15cm SBR, and ventral hernia repair after found with strangulated umbilical hernia. Seen and examined at bedside, Consulted for bleeding from incision, found this morning with a single 4x4 piece of gauze soaked in blood. No active bleeding on exam, nor expressible fluid/blood. No dizziness, headache, or light-headedness. No pain. This was likely draining hematoma as patient with notable subcutaneous fat. Hemodynamically stable. clinically well-appearing.     # Abdominal Incisional Bleeding  - Patient okay for discharge   - Follow up with Dr. Kellogg as scheduled, or schedule if already hasn't.     Plan discussed with Dr. Kellogg who agrees.

## 2021-03-16 PROCEDURE — 85025 COMPLETE CBC W/AUTO DIFF WBC: CPT

## 2021-03-16 PROCEDURE — 74177 CT ABD & PELVIS W/CONTRAST: CPT

## 2021-03-16 PROCEDURE — 84100 ASSAY OF PHOSPHORUS: CPT

## 2021-03-16 PROCEDURE — 80048 BASIC METABOLIC PNL TOTAL CA: CPT

## 2021-03-16 PROCEDURE — 88302 TISSUE EXAM BY PATHOLOGIST: CPT

## 2021-03-16 PROCEDURE — 83036 HEMOGLOBIN GLYCOSYLATED A1C: CPT

## 2021-03-16 PROCEDURE — 85730 THROMBOPLASTIN TIME PARTIAL: CPT

## 2021-03-16 PROCEDURE — U0003: CPT

## 2021-03-16 PROCEDURE — 86850 RBC ANTIBODY SCREEN: CPT

## 2021-03-16 PROCEDURE — 83605 ASSAY OF LACTIC ACID: CPT

## 2021-03-16 PROCEDURE — 88307 TISSUE EXAM BY PATHOLOGIST: CPT

## 2021-03-16 PROCEDURE — 82947 ASSAY GLUCOSE BLOOD QUANT: CPT

## 2021-03-16 PROCEDURE — 86769 SARS-COV-2 COVID-19 ANTIBODY: CPT

## 2021-03-16 PROCEDURE — 82330 ASSAY OF CALCIUM: CPT

## 2021-03-16 PROCEDURE — 97163 PT EVAL HIGH COMPLEX 45 MIN: CPT

## 2021-03-16 PROCEDURE — U0005: CPT

## 2021-03-16 PROCEDURE — 82435 ASSAY OF BLOOD CHLORIDE: CPT

## 2021-03-16 PROCEDURE — 85610 PROTHROMBIN TIME: CPT

## 2021-03-16 PROCEDURE — 83690 ASSAY OF LIPASE: CPT

## 2021-03-16 PROCEDURE — 83735 ASSAY OF MAGNESIUM: CPT

## 2021-03-16 PROCEDURE — 84132 ASSAY OF SERUM POTASSIUM: CPT

## 2021-03-16 PROCEDURE — 80053 COMPREHEN METABOLIC PANEL: CPT

## 2021-03-16 PROCEDURE — 84295 ASSAY OF SERUM SODIUM: CPT

## 2021-03-16 PROCEDURE — 85027 COMPLETE CBC AUTOMATED: CPT

## 2021-03-16 PROCEDURE — 85018 HEMOGLOBIN: CPT

## 2021-03-16 PROCEDURE — 84484 ASSAY OF TROPONIN QUANT: CPT

## 2021-03-16 PROCEDURE — 99285 EMERGENCY DEPT VISIT HI MDM: CPT | Mod: 25

## 2021-03-16 PROCEDURE — 96374 THER/PROPH/DIAG INJ IV PUSH: CPT

## 2021-03-16 PROCEDURE — 36415 COLL VENOUS BLD VENIPUNCTURE: CPT

## 2021-03-16 PROCEDURE — C1889: CPT

## 2021-03-16 PROCEDURE — 93005 ELECTROCARDIOGRAM TRACING: CPT

## 2021-03-16 PROCEDURE — 82803 BLOOD GASES ANY COMBINATION: CPT

## 2021-03-16 PROCEDURE — 96375 TX/PRO/DX INJ NEW DRUG ADDON: CPT

## 2021-03-16 PROCEDURE — 86901 BLOOD TYPING SEROLOGIC RH(D): CPT

## 2021-03-16 PROCEDURE — 86900 BLOOD TYPING SEROLOGIC ABO: CPT

## 2021-03-16 PROCEDURE — 85014 HEMATOCRIT: CPT

## 2021-03-16 PROCEDURE — 82272 OCCULT BLD FECES 1-3 TESTS: CPT

## 2021-03-16 PROCEDURE — 82962 GLUCOSE BLOOD TEST: CPT

## 2021-03-16 PROCEDURE — 99261: CPT

## 2021-03-23 ENCOUNTER — OUTPATIENT (OUTPATIENT)
Dept: OUTPATIENT SERVICES | Facility: HOSPITAL | Age: 61
LOS: 1 days | End: 2021-03-23

## 2021-03-23 ENCOUNTER — APPOINTMENT (OUTPATIENT)
Dept: ULTRASOUND IMAGING | Facility: CLINIC | Age: 61
End: 2021-03-23
Payer: MEDICARE

## 2021-03-23 ENCOUNTER — APPOINTMENT (OUTPATIENT)
Dept: INTERVENTIONAL RADIOLOGY/VASCULAR | Facility: CLINIC | Age: 61
End: 2021-03-23
Payer: MEDICARE

## 2021-03-23 DIAGNOSIS — Z49.01 ENCOUNTER FOR FITTING AND ADJUSTMENT OF EXTRACORPOREAL DIALYSIS CATHETER: Chronic | ICD-10-CM

## 2021-03-23 DIAGNOSIS — I77.0 ARTERIOVENOUS FISTULA, ACQUIRED: Chronic | ICD-10-CM

## 2021-03-23 DIAGNOSIS — Z00.8 ENCOUNTER FOR OTHER GENERAL EXAMINATION: ICD-10-CM

## 2021-03-23 LAB
BASOPHILS # BLD AUTO: 0.02 K/UL
BASOPHILS NFR BLD AUTO: 0.6 %
EOSINOPHIL # BLD AUTO: 0.17 K/UL
EOSINOPHIL NFR BLD AUTO: 4.9 %
HCT VFR BLD CALC: 31.4 %
HGB BLD-MCNC: 9.3 G/DL
IMM GRANULOCYTES NFR BLD AUTO: 0.3 %
INR PPP: 1.04 RATIO
LYMPHOCYTES # BLD AUTO: 0.57 K/UL
LYMPHOCYTES NFR BLD AUTO: 16.3 %
MAN DIFF?: NORMAL
MCHC RBC-ENTMCNC: 29.6 GM/DL
MCHC RBC-ENTMCNC: 29.6 PG
MCV RBC AUTO: 100 FL
MONOCYTES # BLD AUTO: 0.34 K/UL
MONOCYTES NFR BLD AUTO: 9.7 %
NEUTROPHILS # BLD AUTO: 2.38 K/UL
NEUTROPHILS NFR BLD AUTO: 68.2 %
PLATELET # BLD AUTO: 258 K/UL
PT BLD: 12.4 SEC
RBC # BLD: 3.14 M/UL
RBC # FLD: 15.2 %
SARS-COV-2 N GENE NPH QL NAA+PROBE: NOT DETECTED
WBC # FLD AUTO: 3.49 K/UL

## 2021-03-23 PROCEDURE — 76705 ECHO EXAM OF ABDOMEN: CPT | Mod: 26

## 2021-03-23 PROCEDURE — 49083 ABD PARACENTESIS W/IMAGING: CPT

## 2021-03-24 NOTE — PROGRESS NOTE ADULT - PROBLEM SELECTOR PROBLEM 6
Need for prophylactic measure Diabetes mellitus Trilobed Flap Text: The defect edges were debeveled with a #15 scalpel blade.  Given the location of the defect and the proximity to free margins a trilobed flap was deemed most appropriate.  Using a sterile surgical marker, an appropriate trilobed flap drawn around the defect.    The area thus outlined was incised deep to adipose tissue with a #15 scalpel blade.  The skin margins were undermined to an appropriate distance in all directions utilizing iris scissors.

## 2021-04-07 ENCOUNTER — INPATIENT (INPATIENT)
Facility: HOSPITAL | Age: 61
LOS: 2 days | Discharge: HOME | DRG: 308 | End: 2021-04-10
Attending: FAMILY MEDICINE | Admitting: HOSPITALIST
Payer: MEDICARE

## 2021-04-07 VITALS
OXYGEN SATURATION: 99 % | WEIGHT: 149.91 LBS | HEART RATE: 178 BPM | DIASTOLIC BLOOD PRESSURE: 63 MMHG | TEMPERATURE: 99 F | RESPIRATION RATE: 20 BRPM | HEIGHT: 63 IN | SYSTOLIC BLOOD PRESSURE: 126 MMHG

## 2021-04-07 DIAGNOSIS — I77.0 ARTERIOVENOUS FISTULA, ACQUIRED: Chronic | ICD-10-CM

## 2021-04-07 DIAGNOSIS — I47.1 SUPRAVENTRICULAR TACHYCARDIA: ICD-10-CM

## 2021-04-07 DIAGNOSIS — Z49.01 ENCOUNTER FOR FITTING AND ADJUSTMENT OF EXTRACORPOREAL DIALYSIS CATHETER: Chronic | ICD-10-CM

## 2021-04-07 LAB
ALBUMIN SERPL ELPH-MCNC: 4 G/DL — SIGNIFICANT CHANGE UP (ref 3.3–5.2)
ALP SERPL-CCNC: 303 U/L — HIGH (ref 40–120)
ALT FLD-CCNC: 17 U/L — SIGNIFICANT CHANGE UP
ANION GAP SERPL CALC-SCNC: 17 MMOL/L — SIGNIFICANT CHANGE UP (ref 5–17)
APTT BLD: 34 SEC — SIGNIFICANT CHANGE UP (ref 27.5–35.5)
AST SERPL-CCNC: 30 U/L — SIGNIFICANT CHANGE UP
BASOPHILS # BLD AUTO: 0.02 K/UL — SIGNIFICANT CHANGE UP (ref 0–0.2)
BASOPHILS NFR BLD AUTO: 0.3 % — SIGNIFICANT CHANGE UP (ref 0–2)
BILIRUB SERPL-MCNC: 0.7 MG/DL — SIGNIFICANT CHANGE UP (ref 0.4–2)
BUN SERPL-MCNC: 14 MG/DL — SIGNIFICANT CHANGE UP (ref 8–20)
CALCIUM SERPL-MCNC: 8.6 MG/DL — SIGNIFICANT CHANGE UP (ref 8.6–10.2)
CHLORIDE SERPL-SCNC: 90 MMOL/L — LOW (ref 98–107)
CO2 SERPL-SCNC: 29 MMOL/L — SIGNIFICANT CHANGE UP (ref 22–29)
CREAT SERPL-MCNC: 2.58 MG/DL — HIGH (ref 0.5–1.3)
EOSINOPHIL # BLD AUTO: 0.12 K/UL — SIGNIFICANT CHANGE UP (ref 0–0.5)
EOSINOPHIL NFR BLD AUTO: 1.8 % — SIGNIFICANT CHANGE UP (ref 0–6)
GLUCOSE SERPL-MCNC: 180 MG/DL — HIGH (ref 70–99)
HCT VFR BLD CALC: 30.3 % — LOW (ref 34.5–45)
HGB BLD-MCNC: 9.5 G/DL — LOW (ref 11.5–15.5)
IMM GRANULOCYTES NFR BLD AUTO: 0.3 % — SIGNIFICANT CHANGE UP (ref 0–1.5)
INR BLD: 1.13 RATIO — SIGNIFICANT CHANGE UP (ref 0.88–1.16)
LYMPHOCYTES # BLD AUTO: 0.57 K/UL — LOW (ref 1–3.3)
LYMPHOCYTES # BLD AUTO: 8.4 % — LOW (ref 13–44)
MCHC RBC-ENTMCNC: 30.6 PG — SIGNIFICANT CHANGE UP (ref 27–34)
MCHC RBC-ENTMCNC: 31.4 GM/DL — LOW (ref 32–36)
MCV RBC AUTO: 97.7 FL — SIGNIFICANT CHANGE UP (ref 80–100)
MONOCYTES # BLD AUTO: 0.45 K/UL — SIGNIFICANT CHANGE UP (ref 0–0.9)
MONOCYTES NFR BLD AUTO: 6.6 % — SIGNIFICANT CHANGE UP (ref 2–14)
NEUTROPHILS # BLD AUTO: 5.64 K/UL — SIGNIFICANT CHANGE UP (ref 1.8–7.4)
NEUTROPHILS NFR BLD AUTO: 82.6 % — HIGH (ref 43–77)
PLATELET # BLD AUTO: 225 K/UL — SIGNIFICANT CHANGE UP (ref 150–400)
POTASSIUM SERPL-MCNC: 4.1 MMOL/L — SIGNIFICANT CHANGE UP (ref 3.5–5.3)
POTASSIUM SERPL-SCNC: 4.1 MMOL/L — SIGNIFICANT CHANGE UP (ref 3.5–5.3)
PROT SERPL-MCNC: 8 G/DL — SIGNIFICANT CHANGE UP (ref 6.6–8.7)
PROTHROM AB SERPL-ACNC: 13 SEC — SIGNIFICANT CHANGE UP (ref 10.6–13.6)
RBC # BLD: 3.1 M/UL — LOW (ref 3.8–5.2)
RBC # FLD: 15.9 % — HIGH (ref 10.3–14.5)
SODIUM SERPL-SCNC: 136 MMOL/L — SIGNIFICANT CHANGE UP (ref 135–145)
TROPONIN T SERPL-MCNC: 0.55 NG/ML — HIGH (ref 0–0.06)
WBC # BLD: 6.82 K/UL — SIGNIFICANT CHANGE UP (ref 3.8–10.5)
WBC # FLD AUTO: 6.82 K/UL — SIGNIFICANT CHANGE UP (ref 3.8–10.5)

## 2021-04-07 PROCEDURE — 99291 CRITICAL CARE FIRST HOUR: CPT | Mod: CS

## 2021-04-07 PROCEDURE — 99223 1ST HOSP IP/OBS HIGH 75: CPT

## 2021-04-07 PROCEDURE — 71045 X-RAY EXAM CHEST 1 VIEW: CPT | Mod: 26

## 2021-04-07 PROCEDURE — 93010 ELECTROCARDIOGRAM REPORT: CPT

## 2021-04-07 RX ORDER — HYDRALAZINE HCL 50 MG
25 TABLET ORAL DAILY
Refills: 0 | Status: DISCONTINUED | OUTPATIENT
Start: 2021-04-07 | End: 2021-04-10

## 2021-04-07 RX ORDER — METOPROLOL TARTRATE 50 MG
50 TABLET ORAL DAILY
Refills: 0 | Status: DISCONTINUED | OUTPATIENT
Start: 2021-04-07 | End: 2021-04-09

## 2021-04-07 RX ORDER — AMLODIPINE BESYLATE 2.5 MG/1
10 TABLET ORAL DAILY
Refills: 0 | Status: DISCONTINUED | OUTPATIENT
Start: 2021-04-07 | End: 2021-04-10

## 2021-04-07 RX ORDER — LISINOPRIL 2.5 MG/1
40 TABLET ORAL DAILY
Refills: 0 | Status: DISCONTINUED | OUTPATIENT
Start: 2021-04-07 | End: 2021-04-10

## 2021-04-07 RX ORDER — ATORVASTATIN CALCIUM 80 MG/1
40 TABLET, FILM COATED ORAL AT BEDTIME
Refills: 0 | Status: DISCONTINUED | OUTPATIENT
Start: 2021-04-07 | End: 2021-04-10

## 2021-04-07 RX ORDER — ASPIRIN/CALCIUM CARB/MAGNESIUM 324 MG
81 TABLET ORAL DAILY
Refills: 0 | Status: DISCONTINUED | OUTPATIENT
Start: 2021-04-07 | End: 2021-04-10

## 2021-04-07 RX ORDER — ADENOSINE 3 MG/ML
6 INJECTION INTRAVENOUS ONCE
Refills: 0 | Status: COMPLETED | OUTPATIENT
Start: 2021-04-07 | End: 2021-04-07

## 2021-04-07 RX ORDER — LEVOTHYROXINE SODIUM 125 MCG
50 TABLET ORAL DAILY
Refills: 0 | Status: DISCONTINUED | OUTPATIENT
Start: 2021-04-07 | End: 2021-04-10

## 2021-04-07 RX ADMIN — ADENOSINE 6 MILLIGRAM(S): 3 INJECTION INTRAVENOUS at 18:25

## 2021-04-07 NOTE — H&P ADULT - ASSESSMENT
59 y/o female with PMH of ESRD on HD (M/W/F), hypothyroidism, PAfib (not on AC due to GIB), HTN/HLD was sent to the ED from dialysis center for back pain. Patient reported sharp pain that started in her upper back, radiated to her chest followed by palpitation; she said it felt as if "my heart want to jump out of my chest". Found to be in SVT in the ED. Given Adenosine 6mg once with good response.     SVT   Admit to telemetry   S/p Adenosine once  Troponin: 0.55 (likely due to ESRD, patient with chronic elevated troponin) will trend    Cardiology consulted will see in AM     ESRD   HD on M/W/F   Last HD today, completed her session   Renal consulted     PAfib   Not on AC due to GI bleed   Continue Aspirin 81mg   Metoprolol 50mg with holding parameters     Hypothyroidism   Synthroid 50mcg     HTN/HLD   Lipitor 40mg   Amlodipine 10mg   Hydralazine 25mg   Metoprolol 50mg with holding parameters   Lisinopril 40mg     DM-2   Insulin sliding scale     Supportive   DVT prophylaxis: Heparin sc  Diet: renal     Plan of care discussed with patient using      60 minutes spent

## 2021-04-07 NOTE — ED ADULT NURSE NOTE - NSIMPLEMENTINTERV_GEN_ALL_ED
Implemented All Fall with Harm Risk Interventions:  [place] to call system. Call bell, personal items and telephone within reach. Instruct patient to call for assistance. Room bathroom lighting operational. Non-slip footwear when patient is off stretcher. Physically safe environment: no spills, clutter or unnecessary equipment. Stretcher in lowest position, wheels locked, appropriate side rails in place. Provide visual cue, wrist band, yellow gown, etc. Monitor gait and stability. Monitor for mental status changes and reorient to person, place, and time. Review medications for side effects contributing to fall risk. Reinforce activity limits and safety measures with patient and family. Provide visual clues: red socks.

## 2021-04-07 NOTE — H&P ADULT - NSHPPHYSICALEXAM_GEN_ALL_CORE
Vital Signs Last 24 Hrs  T(C): 37.1 (07 Apr 2021 18:08), Max: 37.1 (07 Apr 2021 18:08)  T(F): 98.8 (07 Apr 2021 18:08), Max: 98.8 (07 Apr 2021 18:08)  HR: 87 (07 Apr 2021 19:32) (87 - 178)  BP: 181/88 (07 Apr 2021 23:10) (126/63 - 185/83)  BP(mean): 118 (07 Apr 2021 19:32) (118 - 118)  RR: 16 (07 Apr 2021 19:32) (14 - 20)  SpO2: 95% (07 Apr 2021 19:32) (93% - 99%)

## 2021-04-07 NOTE — ED ADULT TRIAGE NOTE - CHIEF COMPLAINT QUOTE
from [facility] for elevated HR. pt noted to be 178 denies symptoms. pt has L AVF finished 2.5/3.5 hrs of treatment. RR even unlabored. pt brought to CC,

## 2021-04-07 NOTE — ED PROVIDER NOTE - PROGRESS NOTE DETAILS
Pt. now back to sinus rhythm. SVT broke after one dose of Adenosine. Pt. sates that her chest pain is much less. Awaiting labs. Spoke to Dr. Larsen(renal). Pt. will be seen in the morning. Also spoke to Dr. Betancur(Cards), they will see the patient in the morning. Pt. will be admitted to medicine.

## 2021-04-07 NOTE — H&P ADULT - HISTORY OF PRESENT ILLNESS
61 y/o female with PMH of ESRD on HD (M/W/F), hypothyroidism, PAfib (not on AC due to GIB), HTN/HLD was sent to the ED from dialysis center for back pain. Patient reported sharp pain that started in her upper back, radiated to her chest followed by palpitation; she said it felt as if "my heart want to jump out of my chest". Found to be in SVT in the ED. Patient said she had a 3 hours session of HD. At the time of my evaluation she no longer has chest pain, no shortness of breath, palpitation, fever, chills, nausea, vomiting, abdominal pain, diarrhea, dizziness.

## 2021-04-07 NOTE — ED ADULT NURSE NOTE - OBJECTIVE STATEMENT
barb used for assessment.   pt awake, alert and oriented x3 c/o chest pain that began today while at dialysis.  unable to complete dialysis.  pt with SVT rate 180 in critical care.  given 6mg adenosine with improvement, now in NSR, pt feeling relief in chest pain.  respirations even and unlabored.  skin warm and dry. dialysis access to left arm.  KWONG well and with purpose.

## 2021-04-07 NOTE — ED ADULT NURSE REASSESSMENT NOTE - NS ED NURSE REASSESS COMMENT FT1
Assumed pt care at this time. Pt resting comfortably in stretcher, A&Ox3, NAD noted, respirations even and nonlabored, NSR on monitor. Pt offers no complaints at this time.

## 2021-04-07 NOTE — ED PROVIDER NOTE - OBJECTIVE STATEMENT
Pt. states that while having dialysis she experienced sharp pain to her upper back THAT radiated to her chest. Dialysis had to be stopped. No SOB. Pt. had some dizziness that resolved. in the ED pt. was found to be in rapid SVT. No abdominal pain. NO vomiting. NO confusion. Pt. was not hypoxic. Jesús mensah was at the bedside.

## 2021-04-08 LAB
ANION GAP SERPL CALC-SCNC: 13 MMOL/L — SIGNIFICANT CHANGE UP (ref 5–17)
BUN SERPL-MCNC: 18 MG/DL — SIGNIFICANT CHANGE UP (ref 8–20)
CALCIUM SERPL-MCNC: 8.2 MG/DL — LOW (ref 8.6–10.2)
CHLORIDE SERPL-SCNC: 94 MMOL/L — LOW (ref 98–107)
CO2 SERPL-SCNC: 30 MMOL/L — HIGH (ref 22–29)
CREAT SERPL-MCNC: 3.5 MG/DL — HIGH (ref 0.5–1.3)
GLUCOSE BLDC GLUCOMTR-MCNC: 107 MG/DL — HIGH (ref 70–99)
GLUCOSE BLDC GLUCOMTR-MCNC: 218 MG/DL — HIGH (ref 70–99)
GLUCOSE BLDC GLUCOMTR-MCNC: 226 MG/DL — HIGH (ref 70–99)
GLUCOSE BLDC GLUCOMTR-MCNC: 239 MG/DL — HIGH (ref 70–99)
GLUCOSE SERPL-MCNC: 253 MG/DL — HIGH (ref 70–99)
MAGNESIUM SERPL-MCNC: 2.2 MG/DL — SIGNIFICANT CHANGE UP (ref 1.6–2.6)
NT-PROBNP SERPL-SCNC: HIGH PG/ML (ref 0–300)
PHOSPHATE SERPL-MCNC: 3.6 MG/DL — SIGNIFICANT CHANGE UP (ref 2.4–4.7)
POTASSIUM SERPL-MCNC: 4.3 MMOL/L — SIGNIFICANT CHANGE UP (ref 3.5–5.3)
POTASSIUM SERPL-SCNC: 4.3 MMOL/L — SIGNIFICANT CHANGE UP (ref 3.5–5.3)
SARS-COV-2 RNA SPEC QL NAA+PROBE: SIGNIFICANT CHANGE UP
SODIUM SERPL-SCNC: 137 MMOL/L — SIGNIFICANT CHANGE UP (ref 135–145)
TROPONIN T SERPL-MCNC: 0.54 NG/ML — HIGH (ref 0–0.06)

## 2021-04-08 PROCEDURE — 93306 TTE W/DOPPLER COMPLETE: CPT | Mod: 26

## 2021-04-08 PROCEDURE — 70450 CT HEAD/BRAIN W/O DYE: CPT | Mod: 26

## 2021-04-08 PROCEDURE — 73130 X-RAY EXAM OF HAND: CPT | Mod: 26,LT

## 2021-04-08 PROCEDURE — 73090 X-RAY EXAM OF FOREARM: CPT | Mod: 26,LT

## 2021-04-08 PROCEDURE — 99233 SBSQ HOSP IP/OBS HIGH 50: CPT

## 2021-04-08 PROCEDURE — 99223 1ST HOSP IP/OBS HIGH 75: CPT

## 2021-04-08 PROCEDURE — 73110 X-RAY EXAM OF WRIST: CPT | Mod: 26,LT

## 2021-04-08 PROCEDURE — 74174 CTA ABD&PLVS W/CONTRAST: CPT | Mod: 26

## 2021-04-08 PROCEDURE — 72128 CT CHEST SPINE W/O DYE: CPT | Mod: 26

## 2021-04-08 PROCEDURE — 99221 1ST HOSP IP/OBS SF/LOW 40: CPT

## 2021-04-08 PROCEDURE — 72100 X-RAY EXAM L-S SPINE 2/3 VWS: CPT | Mod: 26

## 2021-04-08 PROCEDURE — 71275 CT ANGIOGRAPHY CHEST: CPT | Mod: 26

## 2021-04-08 PROCEDURE — 72131 CT LUMBAR SPINE W/O DYE: CPT | Mod: 26

## 2021-04-08 RX ORDER — DEXTROSE 50 % IN WATER 50 %
12.5 SYRINGE (ML) INTRAVENOUS ONCE
Refills: 0 | Status: DISCONTINUED | OUTPATIENT
Start: 2021-04-08 | End: 2021-04-10

## 2021-04-08 RX ORDER — SODIUM CHLORIDE 9 MG/ML
1000 INJECTION, SOLUTION INTRAVENOUS
Refills: 0 | Status: DISCONTINUED | OUTPATIENT
Start: 2021-04-08 | End: 2021-04-10

## 2021-04-08 RX ORDER — DEXTROSE 50 % IN WATER 50 %
25 SYRINGE (ML) INTRAVENOUS ONCE
Refills: 0 | Status: DISCONTINUED | OUTPATIENT
Start: 2021-04-08 | End: 2021-04-10

## 2021-04-08 RX ORDER — GLUCAGON INJECTION, SOLUTION 0.5 MG/.1ML
1 INJECTION, SOLUTION SUBCUTANEOUS ONCE
Refills: 0 | Status: DISCONTINUED | OUTPATIENT
Start: 2021-04-08 | End: 2021-04-10

## 2021-04-08 RX ORDER — INSULIN LISPRO 100/ML
VIAL (ML) SUBCUTANEOUS
Refills: 0 | Status: DISCONTINUED | OUTPATIENT
Start: 2021-04-08 | End: 2021-04-10

## 2021-04-08 RX ORDER — ERYTHROPOIETIN 10000 [IU]/ML
10000 INJECTION, SOLUTION INTRAVENOUS; SUBCUTANEOUS
Refills: 0 | Status: DISCONTINUED | OUTPATIENT
Start: 2021-04-08 | End: 2021-04-10

## 2021-04-08 RX ORDER — INSULIN LISPRO 100/ML
VIAL (ML) SUBCUTANEOUS AT BEDTIME
Refills: 0 | Status: DISCONTINUED | OUTPATIENT
Start: 2021-04-08 | End: 2021-04-10

## 2021-04-08 RX ORDER — OXYCODONE AND ACETAMINOPHEN 5; 325 MG/1; MG/1
1 TABLET ORAL ONCE
Refills: 0 | Status: DISCONTINUED | OUTPATIENT
Start: 2021-04-08 | End: 2021-04-08

## 2021-04-08 RX ORDER — DEXTROSE 50 % IN WATER 50 %
15 SYRINGE (ML) INTRAVENOUS ONCE
Refills: 0 | Status: DISCONTINUED | OUTPATIENT
Start: 2021-04-08 | End: 2021-04-10

## 2021-04-08 RX ORDER — HEPARIN SODIUM 5000 [USP'U]/ML
5000 INJECTION INTRAVENOUS; SUBCUTANEOUS EVERY 8 HOURS
Refills: 0 | Status: DISCONTINUED | OUTPATIENT
Start: 2021-04-08 | End: 2021-04-10

## 2021-04-08 RX ADMIN — HEPARIN SODIUM 5000 UNIT(S): 5000 INJECTION INTRAVENOUS; SUBCUTANEOUS at 05:26

## 2021-04-08 RX ADMIN — AMLODIPINE BESYLATE 10 MILLIGRAM(S): 2.5 TABLET ORAL at 05:27

## 2021-04-08 RX ADMIN — ATORVASTATIN CALCIUM 40 MILLIGRAM(S): 80 TABLET, FILM COATED ORAL at 21:14

## 2021-04-08 RX ADMIN — Medication 50 MICROGRAM(S): at 05:26

## 2021-04-08 RX ADMIN — Medication 50 MILLIGRAM(S): at 05:27

## 2021-04-08 RX ADMIN — Medication 81 MILLIGRAM(S): at 12:03

## 2021-04-08 RX ADMIN — Medication 4: at 07:38

## 2021-04-08 RX ADMIN — Medication 4: at 16:10

## 2021-04-08 RX ADMIN — Medication 25 MILLIGRAM(S): at 05:27

## 2021-04-08 RX ADMIN — HEPARIN SODIUM 5000 UNIT(S): 5000 INJECTION INTRAVENOUS; SUBCUTANEOUS at 21:14

## 2021-04-08 RX ADMIN — LISINOPRIL 40 MILLIGRAM(S): 2.5 TABLET ORAL at 05:26

## 2021-04-08 RX ADMIN — OXYCODONE AND ACETAMINOPHEN 1 TABLET(S): 5; 325 TABLET ORAL at 23:00

## 2021-04-08 RX ADMIN — OXYCODONE AND ACETAMINOPHEN 1 TABLET(S): 5; 325 TABLET ORAL at 22:13

## 2021-04-08 RX ADMIN — HEPARIN SODIUM 5000 UNIT(S): 5000 INJECTION INTRAVENOUS; SUBCUTANEOUS at 15:01

## 2021-04-08 NOTE — CONSULT NOTE ADULT - SUBJECTIVE AND OBJECTIVE BOX
Patient is a 60y old  Female who presents with a chief complaint of SVT (08 Apr 2021 16:26)      HPI:  59 y/o female with PMH of ESRD on HD (M/W/F), hypothyroidism, PAfib (not on AC due to GIB), HTN/HLD was sent to the ED from dialysis center for back pain. Patient reported sharp pain that started in her upper back, radiated to her chest followed by palpitation; she said it felt as if "my heart want to jump out of my chest". Found to be in SVT in the ED. Patient said she had a 3 hours session of HD. At the time of my evaluation she no longer has chest pain, no shortness of breath, palpitation, fever, chills, nausea, vomiting, abdominal pain, diarrhea, dizziness.  (07 Apr 2021 23:46)      + - LOC   + - trauma   denied Headache   pt c/o + -headache  /10 on the pain scale   + - Nausea /+ - Vomiting  Right Left hand dominant   admits denies new/ worsening weakness  admits denies new/ worsening paresthesias/ numbness   admits denies  new/ worsening visual changes  admits denies C- Spine pain, + - collar/ cleared by trauma   admits denies T/LS  Spine pain, + - brace   admits denies Bowel/ Bladder dysfunction   []MARSHALL; [] ATN; [] AIN; [] Other   [] CKD1; [] CKD2; [] CKD3; [] CKD4, [] CKD5; [x] ESRD      BMI 26.6          GCS: 15  Eye response (E)4  Verbal response (V)5  Motor response (M)6            PAST MEDICAL & SURGICAL HISTORY:  DM (diabetes mellitus)    HTN (hypertension)    HLD (hyperlipidemia)    End stage renal disease on dialysis    Pericardial effusion    Pleural effusion    Chronic systolic congestive heart failure    Anemia due to chronic kidney disease, unspecified CKD stage    Paroxysmal atrial fibrillation    Coronary artery disease, angina presence unspecified, unspecified vessel or lesion type, unspecified whether native or transplanted heart    Diabetes    End stage renal disease    Encounter for dialysis catheter care      A-V fistula        SOCIAL HISTORY: - EtOH, - tobacco, - drugs    FAMILY HISTORY:  Family history of kidney disease in brother (Sibling)     non-pertinent at this time        ROS:  CONSTITUTIONAL: No fever, weight loss, or fatigue  EYES: No eye pain, visual disturbances, or discharge  ENMT:  No difficulty hearing, tinnitus, vertigo; No sinus or throat pain  NECK: No pain or stiffness  RESPIRATORY: No cough, wheezing, chills or hemoptysis; No shortness of breath  CARDIOVASCULAR: No chest pain, palpitations, dizziness, or leg swelling  GASTROINTESTINAL: No abdominal or epigastric pain. No nausea, vomiting, or hematemesis; No diarrhea or constipation. No melena or hematochezia.  GENITOURINARY: No dysuria, frequency, hematuria, or incontinence  NEUROLOGICAL: No headaches, memory loss, loss of strength, numbness, or tremors  SKIN: No itching, burning, rashes, or lesions   MUSCULOSKELETAL: No joint pain or swelling; No muscle, back, or extremity pain        MEDICATIONS  (STANDING):  amLODIPine   Tablet 10 milliGRAM(s) Oral daily  aspirin enteric coated 81 milliGRAM(s) Oral daily  atorvastatin 40 milliGRAM(s) Oral at bedtime  dextrose 40% Gel 15 Gram(s) Oral once  dextrose 5%. 1000 milliLiter(s) (50 mL/Hr) IV Continuous <Continuous>  dextrose 5%. 1000 milliLiter(s) (100 mL/Hr) IV Continuous <Continuous>  dextrose 50% Injectable 25 Gram(s) IV Push once  dextrose 50% Injectable 12.5 Gram(s) IV Push once  dextrose 50% Injectable 25 Gram(s) IV Push once  epoetin gian-epbx (RETACRIT) Injectable 87749 Unit(s) IV Push <User Schedule>  glucagon  Injectable 1 milliGRAM(s) IntraMuscular once  heparin   Injectable 5000 Unit(s) SubCutaneous every 8 hours  hydrALAZINE 25 milliGRAM(s) Oral daily  insulin lispro (ADMELOG) corrective regimen sliding scale   SubCutaneous three times a day before meals  insulin lispro (ADMELOG) corrective regimen sliding scale   SubCutaneous at bedtime  levothyroxine 50 MICROGram(s) Oral daily  lisinopril 40 milliGRAM(s) Oral daily  metoprolol succinate ER 50 milliGRAM(s) Oral daily    MEDICATIONS  (PRN):    Allergies    eggs (Anaphylaxis)  No Known Drug Allergies    Intolerances      Vital Signs Last 24 Hrs  T(C): 37.4 (08 Apr 2021 11:32), Max: 37.4 (08 Apr 2021 11:32)  T(F): 99.3 (08 Apr 2021 11:32), Max: 99.3 (08 Apr 2021 11:32)  HR: 74 (08 Apr 2021 11:32) (74 - 178)  BP: 162/87 (08 Apr 2021 11:32) (126/63 - 185/83)  BP(mean): 118 (07 Apr 2021 19:32) (118 - 118)  RR: 20 (08 Apr 2021 11:32) (14 - 20)  SpO2: 95% (08 Apr 2021 11:32) (92% - 99%)  ICP Min - Max:   CPP:  MAP:         PHYSICAL EXAM:  GENERAL: NAD, well-groomed, well-developed, AAOx3 and very cooperative  HEAD:  Atraumatic, Normocephalic, no palpable step-off appreciated on palpation  EYES: b/l EOMI, PERRL, conjunctiva and sclera clear,   NECK: Supple, nontender to palpation  + FROM w no muscular soreness reported, neg B/B/K signs.  TS/LS: nontender to palpation midline or paraspinal muscles b/l, no pinpoint tenderness appreciated or paraspinal mm tenderness elicited on palpation b/l   NERVOUS SYSTEM:  Alert & Oriented X3, speech is clear and fluent, no dysarthria appreciated. Good concentration & very cooperative; Motor Strength 5/5 B/L upper and lower extremities, sensory is at baseline and symmetric b/l; No pronators or ankle clonus appreciated b/l, cerebellar signs grossly intact b/l. CN II-XII grossly intact b/l and symmetric; Spurling's test negative b/l; no mclaughlin's b/l appreciated.   EXTREMITIES:  2+ Peripheral Pulses, No clubbing, cyanosis, or edema, Hawkin's test negative b/l.   CHEST/LUNG: Clear to auscultation bilaterally; No rales, rhonchi, wheezing, or rubs  HEART: Regular rate and rhythm; +S1 & +S2  ABDOMEN: Soft, Nontender, Nondistended; Bowel sounds present  LYMPH: No lymphadenopathy noted  SKIN: No rashes or lesions                          9.5    6.82  )-----------( 225      ( 07 Apr 2021 18:48 )             30.3     04-08    137  |  94<L>  |  18.0  ----------------------------<  253<H>  4.3   |  30.0<H>  |  3.50<H>    Ca    8.2<L>      08 Apr 2021 03:15  Phos  3.6     04-08  Mg     2.2     04-08    TPro  8.0  /  Alb  4.0  /  TBili  0.7  /  DBili  x   /  AST  30  /  ALT  17  /  AlkPhos  303<H>  04-07    PT/INR - ( 07 Apr 2021 18:48 )   PT: 13.0 sec;   INR: 1.13 ratio         PTT - ( 07 Apr 2021 18:48 )  PTT:34.0 sec    LIVER FUNCTIONS - ( 07 Apr 2021 18:48 )  Alb: 4.0 g/dL / Pro: 8.0 g/dL / ALK PHOS: 303 U/L / ALT: 17 U/L / AST: 30 U/L / GGT: x             RADIOLOGY & ADDITIONAL STUDIES:  < from: CT Head No Cont (04.08.21 @ 13:12) >  INTERPRETATION:  CLINICAL Indications:  s/p fall  COMPARISON: CT head dated 12/26/2019  TECHNIQUE: Noncontrast CT of the head. Multiplanar reformations are submitted.    FINDINGS: Chronic tiny right of midline pontine lacunar infarct on image 13 of series 3.  There is periventricular and subcortical white matter hypodensity without mass effect, nonspecific, likely representing mild chronic microvascular ischemic changes. There is no compelling evidence for an acute transcortical infarction. There is no evidence of mass, mass effect, midline shift or extra-axial fluid collection. The lateral ventricles and cortical sulci are age-appropriate in size and configuration. The orbits, mastoid air cells and visualized paranasal sinuses are normal. The calvarium is intact. A partial empty sella is noted. Consider MRI as clinically warranted.    IMPRESSION:  Mild chronic microvascular changes without evidence of anacute transcortical infarction or hemorrhage.  < end of copied text >      < from: CT Angio Chest w/ IV Cont (04.08.21 @ 13:13) >  INTERPRETATION:  CLINICAL INFORMATION: Cardiomyopathy. Previous aortic valve excrescence on echocardiogram 2018. Back to chest pain. Evaluate for aortic dissection. On dialysis.  COMPARISON: CT abdomen/pelvis 3/1/2021 and 1/14/2020. CT chest 8/30/2018    CONTRAST/COMPLICATIONS:  IV Contrast: Omnipaque 350  95 cc administered   5 cc discarded  Oral Contrast: NONE  Complications: None reported at time of study completion    PROCEDURE:  CT Angiography of the Chest, Abdomen and Pelvis.  Precontrast imaging was performed through the chest followed by arterial phase imaging of the chest, abdomenand pelvis.  Sagittal and coronal reformats were performed as well as 3D (MIP) reconstructions.    FINDINGS:  CHEST:  LUNGS AND LARGE AIRWAYS: Patent central airways. Groundglass attenuation and interlobular septal thickening, greater in the right lung, possibly pulmonary edema. Additional scattered foci of linear atelectasis or scarring. Bronchial wall thickening in the right lung, greatest in the right lower lobe. And 9 mm right middle lobe pulmonary nodule, unchanged since 3/1/2020.  PLEURA: Small bilateral partially loculated pleural effusions.  VESSELS: Thoracic and abdominal aorta normal in caliber with no aortic dissection. Mild calcified plaque in the thoracic aorta. Subclavian artery and vein prominent caliber and tortuous axillary arteries and veins which are prominent in caliber and tortuous prominent caliber brachial artery and veins. Probable partially imaged AV fistula. No pulmonary embolism.  HEART: Enlarged. No pericardial effusion.  MEDIASTINUM AND YOLY: Enlarged mediastinal lymph nodes including a prevascular lymph node measuring 1.8 x 1.1 cm, previously 1.5 x 0.8 cm (series 6 image 41) and aorticopulmonary window lymph node measuring 2.7 x 1.9 cm, previously 2.6 x 1.6 cm (series 6 image 46). Enlarged subcarinal and right hilar lymph nodes also noted.  CHEST WALL AND LOWER NECK: No enlarged axillary lymph nodes. Anasarca.    ABDOMEN AND PELVIS:  LIVER: Within normal limits.  BILE DUCTS: Normal caliber.  GALLBLADDER: Partially contracted. Cholelithiasis.  SPLEEN: Within normal limits.  PANCREAS: Within normal limits.  ADRENALS: Bilateral adrenal gland thickening.  KIDNEYS/URETERS: Small kidneys. No hydronephrosis. Bilateral renal cysts.    BLADDER: Underdistended, limiting evaluation.  REPRODUCTIVE ORGANS: Uterus and adnexa are unremarkable.    BOWEL: Small bowel loops normal in caliber. Small bowel anastomosis in the mid abdomen. Small to moderate amount retained fecal material in the colon.  PERITONEUM: Large amount of abdominal and pelvic ascites. Smallfocus of high density, possibly calcification again seen at the posterior aspect of the right hemipelvis (series 6 image 270).  VESSELS: Mild plaque in the proximal celiac and superior mesenteric arteries which are patent with no significant narrowing. Inferior mesenteric artery is patent. Renal arteries are patent. Iliac arteries and femoral arteries are patent with no significant narrowing.  RETROPERITONEUM/LYMPH NODES: Enlarged right iliac chain lymph node measuring 1.6 x 1.5 cm, not significantly changed.  ABDOMINAL WALL: Anasarca. Soft tissue thickening or fluid in the anterior abdominal wall measuring 7.0 x 2.5 cm (series 6 image 200) in the region of the previously seen ventral hernia.  BONES: Fractures of the left L1-L3 transverse processes, new. Old left inferior pubic ramus fracture.    IMPRESSION:  No thoracic or abdominal aortic dissection.  New fractures of the left L1-L3 transverse processes since 3/1/2021, likely acute or subacute.  Soft tissue thickening or fluid in the anterior abdominal wall in the region of the previously seen ventral hernia.  Large amount of abdominal and pelvic ascites.  Small bilateral pleural effusions with areas of groundglass attenuation in the lungs and interlobular septal thickening, possibly pulmonary edema.    YODIT CARRANZA MD; Attending Radiologist  This document has been electronically signed. Apr 8 2021  2:32PM  < end of copied text >      < from: Xray Lumbar Spine AP + Lateral (04.08.21 @ 14:31) >  INTERPRETATION:  Clinical history: 60-year-old female, status post fall.  Three views of the lumbosacral spine are correlated with a concurrent abdominal CT and demonstrate mild diffuse degenerative disc disease with no fracture, spondylolisthesis or loss of vertebral body height.    IMPRESSION:  Mild degenerative disc disease with no acute radiographic findings, concurrent CT demonstrates minimally displaced fractures of the left L1-L3 transverse processes    RICH GRAVES DO; Attending Radiologist  This document has been electronically signed. Apr 8 2021  3:16PM  < end of copied text >      Time Spent with patient/ education on the floor & arranging care: >55 mins  Patient is a 60y old  Female who presents with a chief complaint of SVT (08 Apr 2021 16:26)    HPI:  59 y/o female with PMH of ESRD on HD (M/W/F), hypothyroidism, PAfib (not on AC due to GIB), HTN/HLD was sent to the ED from dialysis center for back pain. Patient reported sharp pain that started in her upper back, radiated to her chest followed by palpitation; she said it felt as if "my heart want to jump out of my chest". Found to be in SVT in the ED. Patient said she had a 3 hours session of HD. At the time of my evaluation she no longer has chest pain, no shortness of breath, palpitation, fever, chills, nausea, vomiting, abdominal pain, diarrhea, dizziness.  (07 Apr 2021 23:46)    pt seen in bed, comfortably resting on her right side, pt admits to have left wrist pain.  when asked about back pain and explained why Nsx is seeing pt, she admitted to ingoing hx of back pain and LLE radiculopathy into her thigh   - LOC   + trauma   denied Headache   - Nausea / - Vomiting  denies new/ worsening weakness  denies new/ worsening paresthesias/ numbness   denies  new/ worsening visual changes  admits to have C- Spine pain, - collar  admits to have T/LS  Spine pain, - brace   denies Bowel/ Bladder dysfunction   []MARSHALL; [] ATN; [] AIN; [] Other   [] CKD1; [] CKD2; [] CKD3; [] CKD4, [] CKD5; [x] ESRD  BMI 26.6      GCS: 15  Eye response (E)4  Verbal response (V)5  Motor response (M)6      PAST MEDICAL & SURGICAL HISTORY:  DM (diabetes mellitus)    HTN (hypertension)    HLD (hyperlipidemia)    End stage renal disease on dialysis    Pericardial effusion    Pleural effusion    Chronic systolic congestive heart failure    Anemia due to chronic kidney disease, unspecified CKD stage    Paroxysmal atrial fibrillation    Coronary artery disease, angina presence unspecified, unspecified vessel or lesion type, unspecified whether native or transplanted heart    Diabetes    End stage renal disease    Encounter for dialysis catheter care      A-V fistula        SOCIAL HISTORY: - EtOH, - tobacco, - drugs    FAMILY HISTORY:  Family history of kidney disease in brother (Sibling)  non-pertinent at this time        MEDICATIONS  (STANDING):  amLODIPine   Tablet 10 milliGRAM(s) Oral daily  aspirin enteric coated 81 milliGRAM(s) Oral daily  atorvastatin 40 milliGRAM(s) Oral at bedtime  dextrose 40% Gel 15 Gram(s) Oral once  dextrose 5%. 1000 milliLiter(s) (50 mL/Hr) IV Continuous <Continuous>  dextrose 5%. 1000 milliLiter(s) (100 mL/Hr) IV Continuous <Continuous>  dextrose 50% Injectable 25 Gram(s) IV Push once  dextrose 50% Injectable 12.5 Gram(s) IV Push once  dextrose 50% Injectable 25 Gram(s) IV Push once  epoetin gian-epbx (RETACRIT) Injectable 12441 Unit(s) IV Push <User Schedule>  glucagon  Injectable 1 milliGRAM(s) IntraMuscular once  heparin   Injectable 5000 Unit(s) SubCutaneous every 8 hours  hydrALAZINE 25 milliGRAM(s) Oral daily  insulin lispro (ADMELOG) corrective regimen sliding scale   SubCutaneous three times a day before meals  insulin lispro (ADMELOG) corrective regimen sliding scale   SubCutaneous at bedtime  levothyroxine 50 MICROGram(s) Oral daily  lisinopril 40 milliGRAM(s) Oral daily  metoprolol succinate ER 50 milliGRAM(s) Oral daily    MEDICATIONS  (PRN):    Allergies    eggs (Anaphylaxis)  No Known Drug Allergies    Intolerances      Vital Signs Last 24 Hrs  T(C): 37.4 (08 Apr 2021 11:32), Max: 37.4 (08 Apr 2021 11:32)  T(F): 99.3 (08 Apr 2021 11:32), Max: 99.3 (08 Apr 2021 11:32)  HR: 74 (08 Apr 2021 11:32) (74 - 178)  BP: 162/87 (08 Apr 2021 11:32) (126/63 - 185/83)  BP(mean): 118 (07 Apr 2021 19:32) (118 - 118)  RR: 20 (08 Apr 2021 11:32) (14 - 20)  SpO2: 95% (08 Apr 2021 11:32) (92% - 99%)          PHYSICAL EXAM: Sudanese speaking   GENERAL: NAD, well-groomed, well-developed, AAOx3 and cooperative  HEAD:  Atraumatic, Normocephalic, no palpable step-off appreciated on palpation  EYES: b/l PERRL, conjunctiva and sclera clear,   NECK: Supple, nontender to pinpoint palpation  + FROM w no muscular soreness reported, neg B/B/K signs.  TS/LS: no pinpoint tenderness appreciated or paraspinal mm tenderness elicited on palpation b/l   NERVOUS SYSTEM:  Alert & Oriented X3, speech is clear and fluent, no dysarthria appreciated. Good concentration & very cooperative; Motor Strength 5/5 B/L upper and lower extremities, sensory is at baseline and symmetric b/l; No pronators or ankle clonus appreciated b/l, cerebellar signs grossly intact b/l. FS/ TML, no mclaughlin's b/l appreciated.   EXTREMITIES:  2+ Peripheral Pulses, No clubbing, cyanosis; mild b/l ankle edema R>L,  SKIN: No rashes or lesions                          9.5    6.82  )-----------( 225      ( 07 Apr 2021 18:48 )             30.3     04-08    137  |  94<L>  |  18.0  ----------------------------<  253<H>  4.3   |  30.0<H>  |  3.50<H>    Ca    8.2<L>      08 Apr 2021 03:15  Phos  3.6     04-08  Mg     2.2     04-08    TPro  8.0  /  Alb  4.0  /  TBili  0.7  /  DBili  x   /  AST  30  /  ALT  17  /  AlkPhos  303<H>  04-07    PT/INR - ( 07 Apr 2021 18:48 )   PT: 13.0 sec;   INR: 1.13 ratio         PTT - ( 07 Apr 2021 18:48 )  PTT:34.0 sec    LIVER FUNCTIONS - ( 07 Apr 2021 18:48 )  Alb: 4.0 g/dL / Pro: 8.0 g/dL / ALK PHOS: 303 U/L / ALT: 17 U/L / AST: 30 U/L / GGT: x             RADIOLOGY & ADDITIONAL STUDIES:  < from: CT Head No Cont (04.08.21 @ 13:12) >  INTERPRETATION:  CLINICAL Indications:  s/p fall  COMPARISON: CT head dated 12/26/2019  TECHNIQUE: Noncontrast CT of the head. Multiplanar reformations are submitted.    FINDINGS: Chronic tiny right of midline pontine lacunar infarct on image 13 of series 3.  There is periventricular and subcortical white matter hypodensity without mass effect, nonspecific, likely representing mild chronic microvascular ischemic changes. There is no compelling evidence for an acute transcortical infarction. There is no evidence of mass, mass effect, midline shift or extra-axial fluid collection. The lateral ventricles and cortical sulci are age-appropriate in size and configuration. The orbits, mastoid air cells and visualized paranasal sinuses are normal. The calvarium is intact. A partial empty sella is noted. Consider MRI as clinically warranted.    IMPRESSION:  Mild chronic microvascular changes without evidence of anacute transcortical infarction or hemorrhage.  < end of copied text >      < from: CT Angio Chest w/ IV Cont (04.08.21 @ 13:13) >  INTERPRETATION:  CLINICAL INFORMATION: Cardiomyopathy. Previous aortic valve excrescence on echocardiogram 2018. Back to chest pain. Evaluate for aortic dissection. On dialysis.  COMPARISON: CT abdomen/pelvis 3/1/2021 and 1/14/2020. CT chest 8/30/2018    CONTRAST/COMPLICATIONS:  IV Contrast: Omnipaque 350  95 cc administered   5 cc discarded  Oral Contrast: NONE  Complications: None reported at time of study completion    PROCEDURE:  CT Angiography of the Chest, Abdomen and Pelvis.  Precontrast imaging was performed through the chest followed by arterial phase imaging of the chest, abdomenand pelvis.  Sagittal and coronal reformats were performed as well as 3D (MIP) reconstructions.    FINDINGS:  CHEST:  LUNGS AND LARGE AIRWAYS: Patent central airways. Groundglass attenuation and interlobular septal thickening, greater in the right lung, possibly pulmonary edema. Additional scattered foci of linear atelectasis or scarring. Bronchial wall thickening in the right lung, greatest in the right lower lobe. And 9 mm right middle lobe pulmonary nodule, unchanged since 3/1/2020.  PLEURA: Small bilateral partially loculated pleural effusions.  VESSELS: Thoracic and abdominal aorta normal in caliber with no aortic dissection. Mild calcified plaque in the thoracic aorta. Subclavian artery and vein prominent caliber and tortuous axillary arteries and veins which are prominent in caliber and tortuous prominent caliber brachial artery and veins. Probable partially imaged AV fistula. No pulmonary embolism.  HEART: Enlarged. No pericardial effusion.  MEDIASTINUM AND YOLY: Enlarged mediastinal lymph nodes including a prevascular lymph node measuring 1.8 x 1.1 cm, previously 1.5 x 0.8 cm (series 6 image 41) and aorticopulmonary window lymph node measuring 2.7 x 1.9 cm, previously 2.6 x 1.6 cm (series 6 image 46). Enlarged subcarinal and right hilar lymph nodes also noted.  CHEST WALL AND LOWER NECK: No enlarged axillary lymph nodes. Anasarca.    ABDOMEN AND PELVIS:  LIVER: Within normal limits.  BILE DUCTS: Normal caliber.  GALLBLADDER: Partially contracted. Cholelithiasis.  SPLEEN: Within normal limits.  PANCREAS: Within normal limits.  ADRENALS: Bilateral adrenal gland thickening.  KIDNEYS/URETERS: Small kidneys. No hydronephrosis. Bilateral renal cysts.    BLADDER: Underdistended, limiting evaluation.  REPRODUCTIVE ORGANS: Uterus and adnexa are unremarkable.    BOWEL: Small bowel loops normal in caliber. Small bowel anastomosis in the mid abdomen. Small to moderate amount retained fecal material in the colon.  PERITONEUM: Large amount of abdominal and pelvic ascites. Smallfocus of high density, possibly calcification again seen at the posterior aspect of the right hemipelvis (series 6 image 270).  VESSELS: Mild plaque in the proximal celiac and superior mesenteric arteries which are patent with no significant narrowing. Inferior mesenteric artery is patent. Renal arteries are patent. Iliac arteries and femoral arteries are patent with no significant narrowing.  RETROPERITONEUM/LYMPH NODES: Enlarged right iliac chain lymph node measuring 1.6 x 1.5 cm, not significantly changed.  ABDOMINAL WALL: Anasarca. Soft tissue thickening or fluid in the anterior abdominal wall measuring 7.0 x 2.5 cm (series 6 image 200) in the region of the previously seen ventral hernia.  BONES: Fractures of the left L1-L3 transverse processes, new. Old left inferior pubic ramus fracture.    IMPRESSION:  No thoracic or abdominal aortic dissection.  New fractures of the left L1-L3 transverse processes since 3/1/2021, likely acute or subacute.  Soft tissue thickening or fluid in the anterior abdominal wall in the region of the previously seen ventral hernia.  Large amount of abdominal and pelvic ascites.  Small bilateral pleural effusions with areas of groundglass attenuation in the lungs and interlobular septal thickening, possibly pulmonary edema.    YODIT CARRANZA MD; Attending Radiologist  This document has been electronically signed. Apr 8 2021  2:32PM  < end of copied text >      < from: Xray Lumbar Spine AP + Lateral (04.08.21 @ 14:31) >  INTERPRETATION:  Clinical history: 60-year-old female, status post fall.  Three views of the lumbosacral spine are correlated with a concurrent abdominal CT and demonstrate mild diffuse degenerative disc disease with no fracture, spondylolisthesis or loss of vertebral body height.    IMPRESSION:  Mild degenerative disc disease with no acute radiographic findings, concurrent CT demonstrates minimally displaced fractures of the left L1-L3 transverse processes    RICH GRAVES DO; Attending Radiologist  This document has been electronically signed. Apr 8 2021  3:16PM  < end of copied text >      Time Spent with patient/ education on the floor & arranging care: >55 mins

## 2021-04-08 NOTE — CONSULT NOTE ADULT - ASSESSMENT
ESRD: admitted with SVT post dialysis yesterday  - HD tomorrow  - cont current Rx and monitor HR    Anemia: +CKD  - add MARGI at HD  - check Fe stores  - monitor H/H    RO: phos ok  - monitor off binders for now

## 2021-04-08 NOTE — PROGRESS NOTE ADULT - ATTENDING COMMENTS
Patient seen and examined Reports she feels much better today, no chest pain now and no sob    PHYSICAL EXAM:  Constitutional: No acute distress, alert and oriented by 3  HEENT: AT/NC, EOMI, PERRLA, Normal conjunctiva, no pharyngeal erythema, moist oral mucosa  Respiratory: CTA BL, equal breath sounds, no crackles or wheezing  Cardiovascular: RRR, no edema  Gastrointestinal: soft, Non-tender, Non-distended + Bowel sounds, no rebound or guarding  Musculoskeletal: No joint edema, left AVF  Neurological: CN 2-12 grossly intact, no focal deficits  Skin: warm, dry and intact  Psychiatric: normal mood and affect    discussed with KRIS Kruger and agree with above  HD tomorrow  Nephro and cards eval appreciated.

## 2021-04-08 NOTE — CONSULT NOTE ADULT - SUBJECTIVE AND OBJECTIVE BOX
HPI: The pt is a 59 y/o female with PMH of + ESRD (HD M/W/F), hypothyroidism, HTN, HLD, Afib who presented to the ED post dialysis with back pain and palpitations and was found to have SVT.  Since admission her HR has improved with therapy and she is currently resting and asymptomatic.      PAST MEDICAL & SURGICAL HISTORY:  DM (diabetes mellitus)    HTN (hypertension)    HLD (hyperlipidemia)    End stage renal disease on dialysis    Pericardial effusion    Chronic systolic congestive heart failure    Anemia due to chronic kidney disease, unspecified CKD stage    Paroxysmal atrial fibrillation    Coronary artery disease    Diabetes          FAMILY HISTORY:  Family history of kidney disease in brother (Sibling)        Social History:  No tobacco; no EtOH nor drug abuse      MEDICATIONS  (STANDING):  amLODIPine   Tablet 10 milliGRAM(s) Oral daily  aspirin enteric coated 81 milliGRAM(s) Oral daily  atorvastatin 40 milliGRAM(s) Oral at bedtime  dextrose 40% Gel 15 Gram(s) Oral once  dextrose 5%. 1000 milliLiter(s) (50 mL/Hr) IV Continuous <Continuous>  dextrose 5%. 1000 milliLiter(s) (100 mL/Hr) IV Continuous <Continuous>  dextrose 50% Injectable 25 Gram(s) IV Push once  dextrose 50% Injectable 12.5 Gram(s) IV Push once  dextrose 50% Injectable 25 Gram(s) IV Push once  glucagon  Injectable 1 milliGRAM(s) IntraMuscular once  heparin   Injectable 5000 Unit(s) SubCutaneous every 8 hours  hydrALAZINE 25 milliGRAM(s) Oral daily  insulin lispro (ADMELOG) corrective regimen sliding scale   SubCutaneous three times a day before meals  insulin lispro (ADMELOG) corrective regimen sliding scale   SubCutaneous at bedtime  levothyroxine 50 MICROGram(s) Oral daily  lisinopril 40 milliGRAM(s) Oral daily  metoprolol succinate ER 50 milliGRAM(s) Oral daily    MEDICATIONS  (PRN):      Allergies    eggs (Anaphylaxis)  No Known Drug Allergies    Intolerances        REVIEW OF SYSTEMS:    CONSTITUTIONAL: No fever, weight loss, + fatigue  EYES: No eye pain, visual disturbances, or discharge  ENMT:  No difficulty hearing, tinnitus, vertigo; No sinus or throat pain  NECK: No pain or stiffness  BREASTS: No pain, masses, or nipple discharge  RESPIRATORY: No cough, wheezing, chills or hemoptysis; No shortness of breath  CARDIOVASCULAR: No chest pain, + palpitations  GASTROINTESTINAL: No abdominal or epigastric pain. No nausea, vomiting, or hematemesis; No diarrhea or constipation. No melena or hematochezia.  GENITOURINARY: No dysuria, frequency, hematuria, or incontinence  NEUROLOGICAL: No headaches, memory loss, loss of strength, numbness, or tremors  SKIN: No itching, burning, rashes, or lesions   LYMPH NODES: No enlarged glands  ENDOCRINE: No heat or cold intolerance; No hair loss  MUSCULOSKELETAL: No joint pain or swelling; + back pain  PSYCHIATRIC: No depression, anxiety, mood swings, or difficulty sleeping        Vital Signs Last 24 Hrs  T(C): 36.8 (08 Apr 2021 04:49), Max: 37.1 (07 Apr 2021 18:08)  T(F): 98.3 (08 Apr 2021 04:49), Max: 98.8 (07 Apr 2021 18:08)  HR: 85 (08 Apr 2021 04:49) (84 - 178)  BP: 173/91 (08 Apr 2021 04:49) (126/63 - 185/83)  BP(mean): 118 (07 Apr 2021 19:32) (118 - 118)  RR: 20 (08 Apr 2021 04:49) (14 - 20)  SpO2: 92% (08 Apr 2021 04:49) (92% - 99%)    PHYSICAL EXAM:    GENERAL: Comfortable  HEAD:  Atraumatic, Normocephalic  EYES: Conjunctiva and sclera clear  ENMT: Moist mucous membranes, Good dentition, No lesions  NECK: Supple, No JVD  NERVOUS SYSTEM:  Alert & Oriented X3, intact and symmetric  CHEST/LUNG: Clear bilaterally  HEART: No rub  ABDOMEN: Soft, Nontender, Nondistended; +BS  EXTREMITIES:  2+ Peripheral Pulses, No LE edema  SKIN: No rashes or lesions      LABS:                        9.5    6.82  )-----------( 225      ( 07 Apr 2021 18:48 )             30.3     04-08    137  |  94<L>  |  18.0  ----------------------------<  253<H>  4.3   |  30.0<H>  |  3.50<H>    Ca    8.2<L>      08 Apr 2021 03:15  Phos  3.6     04-08  Mg     2.2     04-08    TPro  8.0  /  Alb  4.0  /  TBili  0.7  /  DBili  x   /  AST  30  /  ALT  17  /  AlkPhos  303<H>  04-07    PT/INR - ( 07 Apr 2021 18:48 )   PT: 13.0 sec;   INR: 1.13 ratio         PTT - ( 07 Apr 2021 18:48 )  PTT:34.0 sec    Magnesium, Serum: 2.2 mg/dL (04-08 @ 03:15)  Phosphorus Level, Serum: 3.6 mg/dL (04-08 @ 03:15)      RADIOLOGY & ADDITIONAL TESTS:

## 2021-04-08 NOTE — CONSULT NOTE ADULT - ASSESSMENT
61 y/o female with PMH of ESRD on HD (M/W/F), hypothyroidism, PAfib (not on AC due to GIB), HTN/HLD was sent to the ED from dialysis center for back pain. Patient reported sharp pain that started in her upper back, radiated to her chest followed by palpitation; she said it felt as if "my heart want to jump out of my chest". Found to be in SVT in the ED. Given Adenosine 6mg once with good response. had an episode to slide off the stretcher/ fall and was complaining of back pain.    CTA chest and LS Xray reports New fractures of the left L1-L3 transverse processes since 3/1/2021, likely acute or subacute.  pt is grossly neurologically intact w lupper lumbar midline pain on palpation     case d/w Dr. Aj and images reviewed   no immediate NSx intervention indicated at this time  neuro-checks q4 HR or as per primary team   ordered reformats of CTA for CT TS and LS for further review   recommed OOB w assist and PT eval   further plan as per Dr Aj   defer further care to primary team

## 2021-04-08 NOTE — PROGRESS NOTE ADULT - SUBJECTIVE AND OBJECTIVE BOX
JAROCHO MORALES    [id number]    60y      Female    INTERVAL HPI/OVERNIGHT EVENTS:  PT seen and examined at bedside.  PT c/o lt sided head tenderness since fall earlier today.  Awaiting head CT. No other complaints.     REVIEW OF SYSTEMS:    CONSTITUTIONAL: No fever, weight loss, or fatigue  RESPIRATORY: No cough, wheezing, hemoptysis; No shortness of breath  CARDIOVASCULAR: No chest pain, palpitations  GASTROINTESTINAL: No abdominal or epigastric pain. No nausea, vomiting  NEUROLOGICAL: +head tenderness, no memory loss,       Vital Signs Last 24 Hrs  T(C): 37.4 (08 Apr 2021 11:32), Max: 37.4 (08 Apr 2021 11:32)  T(F): 99.3 (08 Apr 2021 11:32), Max: 99.3 (08 Apr 2021 11:32)  HR: 74 (08 Apr 2021 11:32) (74 - 178)  BP: 162/87 (08 Apr 2021 11:32) (126/63 - 185/83)  BP(mean): 118 (07 Apr 2021 19:32) (118 - 118)  RR: 20 (08 Apr 2021 11:32) (14 - 20)  SpO2: 95% (08 Apr 2021 11:32) (92% - 99%)    PHYSICAL EXAM:    GENERAL: NAD, well-groomed  HEENT: PERRL, +EOMI  NECK: soft, Supple, No JVD,   CHEST/LUNG: Clear to ascultation bilaterally; No wheezing  HEART: S1S2+, Regular rate and rhythm; No murmurs, rubs, or gallops  ABDOMEN: Soft, Nontender, Nondistended; Bowel sounds present  EXTREMITIES:  2+ Peripheral Pulses, No clubbing, cyanosis, or edema  SKIN: No rashes or lesions  NEURO: AAOX3, no focal deficits, no motor r sensory loss  PSYCH: normal mood      LABS:                        9.5    6.82  )-----------( 225      ( 07 Apr 2021 18:48 )             30.3     04-08    137  |  94<L>  |  18.0  ----------------------------<  253<H>  4.3   |  30.0<H>  |  3.50<H>    Ca    8.2<L>      08 Apr 2021 03:15  Phos  3.6     04-08  Mg     2.2     04-08    TPro  8.0  /  Alb  4.0  /  TBili  0.7  /  DBili  x   /  AST  30  /  ALT  17  /  AlkPhos  303<H>  04-07    PT/INR - ( 07 Apr 2021 18:48 )   PT: 13.0 sec;   INR: 1.13 ratio         PTT - ( 07 Apr 2021 18:48 )  PTT:34.0 sec        MEDICATIONS  (STANDING):  amLODIPine   Tablet 10 milliGRAM(s) Oral daily  aspirin enteric coated 81 milliGRAM(s) Oral daily  atorvastatin 40 milliGRAM(s) Oral at bedtime  dextrose 40% Gel 15 Gram(s) Oral once  dextrose 5%. 1000 milliLiter(s) (50 mL/Hr) IV Continuous <Continuous>  dextrose 5%. 1000 milliLiter(s) (100 mL/Hr) IV Continuous <Continuous>  dextrose 50% Injectable 25 Gram(s) IV Push once  dextrose 50% Injectable 12.5 Gram(s) IV Push once  dextrose 50% Injectable 25 Gram(s) IV Push once  epoetin gian-epbx (RETACRIT) Injectable 45961 Unit(s) IV Push <User Schedule>  glucagon  Injectable 1 milliGRAM(s) IntraMuscular once  heparin   Injectable 5000 Unit(s) SubCutaneous every 8 hours  hydrALAZINE 25 milliGRAM(s) Oral daily  insulin lispro (ADMELOG) corrective regimen sliding scale   SubCutaneous three times a day before meals  insulin lispro (ADMELOG) corrective regimen sliding scale   SubCutaneous at bedtime  levothyroxine 50 MICROGram(s) Oral daily  lisinopril 40 milliGRAM(s) Oral daily  metoprolol succinate ER 50 milliGRAM(s) Oral daily    MEDICATIONS  (PRN):      RADIOLOGY & ADDITIONAL TESTS:    < from: Xray Chest 1 View- PORTABLE-Urgent (Xray Chest 1 View- PORTABLE-Urgent .) (04.07.21 @ 19:19) >  IMPRESSION:  Right atelectasis with congestive changes as noted.

## 2021-04-08 NOTE — CONSULT NOTE ADULT - CONSULT REASON
CTA chest and LS Xray reports New fractures of the left L1-L3 transverse processes since 3/1/2021, likely acute or subacute.

## 2021-04-08 NOTE — CONSULT NOTE ADULT - ASSESSMENT
61 yo female with history of known NICM, s/p cath @ [facility] (06/2019) which revealed normal coronary arteries, EF - 35% with mild recovery of LV function on echo (02/2021)- 44%, trace MR, small chronic pericardial effusoin.. She has a history of HTN, DM, aortic lambl's excrescence (2018),  ESRD on HD & HLD. She was evaluated for paroxysmal afib (2019). She follows with Dr. Kidd, cardiologist. She had a 28 MCOT/event monitor that revealed no recurrent afib and has been maintained on aspirin. She presented to [facility] ER (04/07/2021) after developing back pain to chest post dialysis. In ER, she was found to be in SVT - 178 bpm wtih /63. She was treated with adenosine with conversion to NSR. She has borderline elevated troponin at 0.5 x2. She denies recurrent chest pain or syncope.       1.  SVT  2. Borderline elevated troponin I, likely secondary to demand in setting of or ESRD    PLAN:  Resume home metoprolol er 50 qd.   Continue aspirin.  Repeat echocardiogram.   Consider evaluation for loop recorder by EP - Dr. Hernandes.  59 yo female with history of known NICM, s/p cath @ [facility] (06/2019) which revealed normal coronary arteries, EF - 35% with mild recovery of LV function on echo (02/2021)- 44%, trace MR, small chronic pericardial effusoin.. She has a history of HTN, DM, aortic lambl's excrescence (2018),  ESRD on HD & HLD. She was evaluated for paroxysmal afib (2019). She follows with Dr. Kidd, cardiologist. She had a 28 MCOT/event monitor that revealed no recurrent afib and has been maintained on aspirin. She presented to [facility] ER (04/07/2021) after developing back pain to chest post dialysis. In ER, she was found to be in SVT - 178 bpm wtih /63. She was treated with adenosine with conversion to NSR. She has borderline elevated troponin at 0.5 x2. She denies recurrent chest pain or syncope.       1.  SVT  2. Borderline elevated troponin I, likely secondary to demand in setting of or ESRD  3. Back to chest pain.     PLAN:  Resume home metoprolol er 50 qd.   Continue aspirin.  Repeat echocardiogram.   Consider evaluation for loop recorder by EP - Dr. Hernandes.   ChecK CTA of Chest to exclude aortic pathology in setting of back to chest pain.  59 yo female with history of known NICM, s/p cath @ [facility] (06/2019) which revealed normal coronary arteries, EF - 35% with mild recovery of LV function on echo (02/2021)- 44%, trace MR, small chronic pericardial effusoin.. She has a history of HTN, DM, aortic lambl's excrescence (2018),  ESRD on HD & HLD. She was evaluated for paroxysmal afib (2019). She follows with Dr. Kidd, cardiologist. She had a 28 MCOT/event monitor that revealed no recurrent afib and has been maintained on aspirin. She presented to [facility] ER (04/07/2021) after developing back pain to chest post dialysis. In ER, she was found to be in SVT - 178 bpm wtih /63. She was treated with adenosine with conversion to NSR. She has borderline elevated troponin at 0.5 x2. She denies recurrent chest pain or syncope.       1.  SVT  2. Borderline elevated troponin I, likely secondary to demand in setting of or ESRD  3. Back to chest pain.   4. s/p CXR with congestive changes, r/o acute CHF, will check BNP level.     PLAN:  Resume home metoprolol er 50 qd.   Continue aspirin.  Repeat echocardiogram.   Consider evaluation for loop recorder by EP - Dr. Hernandes.   ChecK CTA of Chest to exclude aortic pathology in setting of back to chest pain.

## 2021-04-08 NOTE — CONSULT NOTE ADULT - SUBJECTIVE AND OBJECTIVE BOX
ORTHO-HAND SERVICE  Pt Name: JAROCHO MORALES    MRN: [id number]      Patient is a 60y Female admitted to [facility] for SVT.  at bedside. As per patient, she fell out of bed this morning when she was trying to get up. Reports falling onto left side. Reports falling onto left hand with left wrist pain. Denies acute sensory/motor changes. No other complaints at ths time.       REVIEW OF SYSTEMS  General: Alert, responsive, in NAD  Skin/Breast: No rashes, no pruritis 	  Ophthalmologic: No visual changes. No redness. 	  ENMT:	No discharge. No swelling.  Respiratory and Thorax: No difficulty breathing. No cough.	   Cardiovascular: No chest pain. No palpitations.  Gastrointestinal: No abdominal pain. No diarrhea.   Genitourinary: No dysuria. No bleeding.  Musculoskeletal: SEE HPI.  Neurological: No sensory or motor changes.   ROS is otherwise negative.    PAST MEDICAL & SURGICAL HISTORY:  PAST MEDICAL & SURGICAL HISTORY:  DM (diabetes mellitus)  HTN (hypertension)  HLD (hyperlipidemia)  End stage renal disease on dialysis  Periardial effusion  Pleural effusion  Chronic systolic congestive heart failure  Anemia due to chronic kidney disease, unspecified CKD stage  Paroxsmal atrial fibrillation  Coronary artery disease, angina presence unspecified, unspecified vessel or lesion type, unspecified whether native or transplanted heart  Diabetes  End stage renal disease  Encounter for dialysis catheter care  A-V fistula  A-V fistula        Allergies: eggs (Anaphylaxis)  No Known Drug Allergies        FAMILY HISTORY:  Family history of kidney disease in brother (Sibling)    : non-contributory    Social History:       Daily     Daily                             9.5    6.82  )-----------( 225      ( 07 Apr 2021 18:48 )             30.3       04-08    137  |  94<L>  |  18.0  ----------------------------<  253<H>  4.3   |  30.0<H>  |  3.50<H>    Ca    8.2<L>      08 Apr 2021 03:15  Phos  3.6     04-08  Mg     2.2     04-08    TPro  8.0  /  Alb  4.0  /  TBili  0.7  /  DBili  x   /  AST  30  /  ALT  17  /  AlkPhos  303<H>  04-07    PHYSICAL EXAM:    Appearance: Alert, responsive, in no acute distress.    LUE  Skin: TTP at left wrist. no rash on visible skin. Skin is clean, dry and intact. No bleeding. No abrasions. No ulcerations.  Neurological: Sensation is grossly intact to light touch.   Motor: R/U/M motor intact. limited flexion/extension of wrist due to pain.   Vascular: 2+ distal pulses. Cap refill < 2 sec. No signs of venous  insufficiency  or stasis. No extremity ulcerations. No cyanosis.    Imaging Studies:  wet read: triquetrum fx    A/P:  Pt is a  60y Female with left wrist pain s/p fall    PLAN:   * Pain control  * Velcro splint placed  * NWB LUE  * F/u outpatient with Dr. Johnson  * Ortho stable  ORTHO-HAND SERVICE  Pt Name: JAROCHO MORALES    MRN: [id number]      Patient is a 60y Female admitted to [facility] for SVT.  at bedside. As per patient, she fell out of bed this morning when she was trying to get up. Reports falling onto left side. Reports falling onto left hand with left wrist pain. Denies acute sensory/motor changes. No other complaints at ths time.       REVIEW OF SYSTEMS  General: Alert, responsive, in NAD  Skin/Breast: No rashes, no pruritis 	  Ophthalmologic: No visual changes. No redness. 	  ENMT:	No discharge. No swelling.  Respiratory and Thorax: No difficulty breathing. No cough.	   Cardiovascular: No chest pain. No palpitations.  Gastrointestinal: No abdominal pain. No diarrhea.   Genitourinary: No dysuria. No bleeding.  Musculoskeletal: SEE HPI.  Neurological: No sensory or motor changes.   ROS is otherwise negative.    PAST MEDICAL & SURGICAL HISTORY:  PAST MEDICAL & SURGICAL HISTORY:  DM (diabetes mellitus)  HTN (hypertension)  HLD (hyperlipidemia)  End stage renal disease on dialysis  Periardial effusion  Pleural effusion  Chronic systolic congestive heart failure  Anemia due to chronic kidney disease, unspecified CKD stage  Paroxsmal atrial fibrillation  Coronary artery disease, angina presence unspecified, unspecified vessel or lesion type, unspecified whether native or transplanted heart  Diabetes  End stage renal disease  Encounter for dialysis catheter care  A-V fistula  A-V fistula        Allergies: eggs (Anaphylaxis)  No Known Drug Allergies        FAMILY HISTORY:  Family history of kidney disease in brother (Sibling)    : non-contributory    Social History:       Daily     Daily                             9.5    6.82  )-----------( 225      ( 07 Apr 2021 18:48 )             30.3       04-08    137  |  94<L>  |  18.0  ----------------------------<  253<H>  4.3   |  30.0<H>  |  3.50<H>    Ca    8.2<L>      08 Apr 2021 03:15  Phos  3.6     04-08  Mg     2.2     04-08    TPro  8.0  /  Alb  4.0  /  TBili  0.7  /  DBili  x   /  AST  30  /  ALT  17  /  AlkPhos  303<H>  04-07    PHYSICAL EXAM:    Appearance: Alert, responsive, in no acute distress.    LUE  Skin: TTP at left wrist. no rash on visible skin. Skin is clean, dry and intact. No bleeding. No abrasions. No ulcerations.  Neurological: Sensation is grossly intact to light touch.   Motor: R/U/M motor intact. limited flexion/extension of wrist due to pain.   Vascular: 2+ distal pulses. Cap refill < 2 sec. No signs of venous  insufficiency  or stasis. No extremity ulcerations. No cyanosis.    Imaging Studies:  wet read: Questionable left triquetral fracture    A/P:  Pt is a  60y Female with left wrist pain s/p fall Questionable left triquetral fracture    PLAN:   * Pain control  * Velcro splint placed  * MAURICIO SMITH  * F/u outpatient with Dr. Johnson  * Ortho stable   * d/w dr johnson

## 2021-04-08 NOTE — CHART NOTE - NSCHARTNOTEFT_GEN_A_CORE
Called by RN, reporting pt fell from stretcher. Pt is A & O x 3.  She reports she wanted to wash her hands and attempted to get up and slipped on her slippers.  She fell onto her left side and reports she hit the left side of her head.  Denies LOC/ n/v. She was able to stand and was assisted back to bed by staff.    Vital Signs Last 24 Hrs  T(C): 36.9 (08 Apr 2021 07:31), Max: 37.1 (07 Apr 2021 18:08)  T(F): 98.4 (08 Apr 2021 07:31), Max: 98.8 (07 Apr 2021 18:08)  HR: 82 (08 Apr 2021 07:31) (82 - 178)  BP: 167/91 (08 Apr 2021 07:31) (126/63 - 185/83)  BP(mean): 118 (07 Apr 2021 19:32) (118 - 118)  RR: 20 (08 Apr 2021 07:31) (14 - 20)  SpO2: 93% (08 Apr 2021 07:31) (92% - 99%)    A & O x 3, NAD, WD/WN  Head- AT/NC, no ecchymosis, no laceration,  tender to palpation on lt parietal area  Neck - supple,  Chest- CTA- b/l  Heart - S1S2 RRR  Abd- soft, NT/ND  Back- no gross deformity, + lt lumbar area tenderness  hips- stable  extremities- no sign of injury, FROM x 4, no edema  Neuro- speech nml, face symmetrical, follows commands, MS equal x 4 extremities.      Fall- c/o LT sided head pain and lt sided back tenderness  Head CT- noncontrast  lumbar spine xray

## 2021-04-08 NOTE — CHART NOTE - NSCHARTNOTEFT_GEN_A_CORE
Pt fell off stretcher earlier today, Head CT and Lumbar spine CT  Pt c/o left wrist pain  Pt holding left wrist  Left wrist Tender to palpation, LROM secondary to discomfort  +pulses + sensory + motor, neurovascular intact  No ecchymosis , erythema or edema noted  No tenderness hand, Elbow or upper arm   Stat Xrays Left hand wrist and forearm Pt fell off stretcher earlier today, Head CT and Lumbar spine CT  Pt c/o left wrist pain  Pt holding left wrist  Left wrist Tender to palpation, point tenderness approx 1 inch above wrist, LROM secondary to discomfort  +pulses + sensory + motor, neurovascular intact  No ecchymosis , erythema or edema noted  No tenderness hand, Elbow or upper arm   Stat Xrays Left hand wrist and forearm Pt fell off stretcher earlier today, Head CT and Lumbar spine CT  Pt c/o left wrist pain  Pt holding left wrist  Left wrist Tender to palpation, point tenderness approx 1 inch above wrist, LROM secondary to discomfort  +pulses + sensory + motor, neurovascular intact  No ecchymosis , erythema or edema noted  No tenderness hand, Elbow or upper arm   Stat Xrays Left hand wrist and forearm    [facility]  soft tissue swelling with questionable Triquetral Fracture  Ortho PA called for Consult  Continue to monitor

## 2021-04-08 NOTE — CONSULT NOTE ADULT - SUBJECTIVE AND OBJECTIVE BOX
JAROCHO MORALES  [id number]      HPI: 59 yo female with history of known NICM, s/p cath @ [facility] (06/2019) which revealed normal coronary arteries, EF - 35% with mild recovery of LV function - 44%. She has a history of HTN, DM, ESRD on HD & HLD. She was evaluated for paroxysmal afib (2019). She follows with Dr. Kidd, cardiologist. She had a 28 MCOT/event monitor that revealed no recurrent afib and has been maintained on aspirin. She presented to [facility] ER (04/07/2021) after developing back pain to chest post dialysis. In ER, she was found to be in SVT - 178 bpm wtih /63. She was treated with adenosine with conversion to NSR. She has borderline elevated troponin at 0.5 x2. She denies recurrent chest pain or syncope.     REVIEW OF SYSTEMS:  NECK: No pain or stiffness  RESPIRATORY: No cough, wheezing, chills or hemoptysis; No Shortness of Breath  CARDIOVASCULAR: see HPI, no syncope  GASTROINTESTINAL: No abdominal or epigastric pain. No nausea, vomiting, or hematemesis; No diarrhea or constipation. No melena or hematochezia.  NEUROLOGICAL: No headaches, memory loss, loss of strength, numbness, or tremors  MUSCULOSKELETAL: No joint pain or swelling; No muscle, or extremity pain see HPI  	      PAST MEDICAL & SURGICAL HISTORY:  DM (diabetes mellitus)    HTN (hypertension)    HLD (hyperlipidemia)    End stage renal disease on dialysis    Pericardial effusion    Pleural effusion    Chronic systolic congestive heart failure    Anemia due to chronic kidney disease, unspecified CKD stage    Paroxysmal atrial fibrillation    Coronary artery disease, angina presence unspecified, unspecified vessel or lesion type, unspecified whether native or transplanted heart    Diabetes    End stage renal disease    Encounter for dialysis catheter care    A-V fistula    A-V fistula        Allergies    eggs (Anaphylaxis)  No Known Drug Allergies    Intolerances        MEDICATIONS  (STANDING):  amLODIPine   Tablet 10 milliGRAM(s) Oral daily  aspirin enteric coated 81 milliGRAM(s) Oral daily  atorvastatin 40 milliGRAM(s) Oral at bedtime  dextrose 40% Gel 15 Gram(s) Oral once  dextrose 5%. 1000 milliLiter(s) (50 mL/Hr) IV Continuous <Continuous>  dextrose 5%. 1000 milliLiter(s) (100 mL/Hr) IV Continuous <Continuous>  dextrose 50% Injectable 25 Gram(s) IV Push once  dextrose 50% Injectable 12.5 Gram(s) IV Push once  dextrose 50% Injectable 25 Gram(s) IV Push once  epoetin gian-epbx (RETACRIT) Injectable 18347 Unit(s) IV Push <User Schedule>  glucagon  Injectable 1 milliGRAM(s) IntraMuscular once  heparin   Injectable 5000 Unit(s) SubCutaneous every 8 hours  hydrALAZINE 25 milliGRAM(s) Oral daily  insulin lispro (ADMELOG) corrective regimen sliding scale   SubCutaneous three times a day before meals  insulin lispro (ADMELOG) corrective regimen sliding scale   SubCutaneous at bedtime  levothyroxine 50 MICROGram(s) Oral daily  lisinopril 40 milliGRAM(s) Oral daily  metoprolol succinate ER 50 milliGRAM(s) Oral daily    MEDICATIONS  (PRN):      FAMILY HISTORY:  Family history of kidney disease in brother (Sibling)        SOCIAL HISTORY: non smoker    EKG: NSR, Non specific T wave changes, iRBBB, long QT  Initial EKG - SVT - 178 bpm, with rate related ST changes.         Vital Signs Last 24 Hrs  T(C): 36.9 (08 Apr 2021 07:31), Max: 37.1 (07 Apr 2021 18:08)  T(F): 98.4 (08 Apr 2021 07:31), Max: 98.8 (07 Apr 2021 18:08)  HR: 82 (08 Apr 2021 07:31) (82 - 178)  BP: 167/91 (08 Apr 2021 07:31) (126/63 - 185/83)  BP(mean): 118 (07 Apr 2021 19:32) (118 - 118)  RR: 20 (08 Apr 2021 07:31) (14 - 20)  SpO2: 93% (08 Apr 2021 07:31) (92% - 99%)    Daily Height in cm: 160.02 (07 Apr 2021 18:08)    Daily     I&O's Detail      PHYSICAL EXAM:  Appearance: Normal, well nourished	  HEENT:   Normal oral mucosa, PERRL, sclera non-icteric	  Lymphatic: No cervical lymphadenopathy  Cardiovascular: Normal S1 S2, No JVD,   Respiratory: Lungs clear to auscultation	  Psychiatry: A & O x 3, Mood & affect appropriate  Gastrointestinal:  Soft, Non-tender, + BS, no bruits	  Skin: No rashes, No ecchymoses, No cyanosis  Neurologic: Grossly non-focal with full strength in all four extremities  Extremities: Normal range of motion, No clubbing, cyanosis or edema  Vascular: Peripheral pulses palpable 2+ bilaterally    LABS:                        9.5    6.82  )-----------( 225      ( 07 Apr 2021 18:48 )             30.3     04-08    137  |  94<L>  |  18.0  ----------------------------<  253<H>  4.3   |  30.0<H>  |  3.50<H>    Ca    8.2<L>      08 Apr 2021 03:15  Phos  3.6     04-08  Mg     2.2     04-08    TPro  8.0  /  Alb  4.0  /  TBili  0.7  /  DBili  x   /  AST  30  /  ALT  17  /  AlkPhos  303<H>  04-07    CARDIAC MARKERS ( 08 Apr 2021 03:15 )  x     / 0.54 ng/mL / x     / x     / x      CARDIAC MARKERS ( 07 Apr 2021 18:48 )  x     / 0.55 ng/mL / x     / x     / x          PT/INR - ( 07 Apr 2021 18:48 )   PT: 13.0 sec;   INR: 1.13 ratio         PTT - ( 07 Apr 2021 18:48 )  PTT:34.0 sec    I&O's Summary

## 2021-04-09 ENCOUNTER — TRANSCRIPTION ENCOUNTER (OUTPATIENT)
Age: 61
End: 2021-04-09

## 2021-04-09 LAB
ANION GAP SERPL CALC-SCNC: 17 MMOL/L — SIGNIFICANT CHANGE UP (ref 5–17)
BUN SERPL-MCNC: 36 MG/DL — HIGH (ref 8–20)
CALCIUM SERPL-MCNC: 8.2 MG/DL — LOW (ref 8.6–10.2)
CHLORIDE SERPL-SCNC: 87 MMOL/L — LOW (ref 98–107)
CO2 SERPL-SCNC: 28 MMOL/L — SIGNIFICANT CHANGE UP (ref 22–29)
COVID-19 SPIKE DOMAIN AB INTERP: POSITIVE
COVID-19 SPIKE DOMAIN ANTIBODY RESULT: 17.5 U/ML — HIGH
CREAT SERPL-MCNC: 5.08 MG/DL — HIGH (ref 0.5–1.3)
GLUCOSE BLDC GLUCOMTR-MCNC: 170 MG/DL — HIGH (ref 70–99)
GLUCOSE BLDC GLUCOMTR-MCNC: 200 MG/DL — HIGH (ref 70–99)
GLUCOSE BLDC GLUCOMTR-MCNC: 203 MG/DL — HIGH (ref 70–99)
GLUCOSE BLDC GLUCOMTR-MCNC: 95 MG/DL — SIGNIFICANT CHANGE UP (ref 70–99)
GLUCOSE SERPL-MCNC: 229 MG/DL — HIGH (ref 70–99)
HCT VFR BLD CALC: 29.4 % — LOW (ref 34.5–45)
HGB BLD-MCNC: 8.9 G/DL — LOW (ref 11.5–15.5)
MCHC RBC-ENTMCNC: 29.8 PG — SIGNIFICANT CHANGE UP (ref 27–34)
MCHC RBC-ENTMCNC: 30.3 GM/DL — LOW (ref 32–36)
MCV RBC AUTO: 98.3 FL — SIGNIFICANT CHANGE UP (ref 80–100)
PLATELET # BLD AUTO: 228 K/UL — SIGNIFICANT CHANGE UP (ref 150–400)
POTASSIUM SERPL-MCNC: 5.1 MMOL/L — SIGNIFICANT CHANGE UP (ref 3.5–5.3)
POTASSIUM SERPL-SCNC: 5.1 MMOL/L — SIGNIFICANT CHANGE UP (ref 3.5–5.3)
RBC # BLD: 2.99 M/UL — LOW (ref 3.8–5.2)
RBC # FLD: 16 % — HIGH (ref 10.3–14.5)
SARS-COV-2 IGG+IGM SERPL QL IA: 17.5 U/ML — HIGH
SARS-COV-2 IGG+IGM SERPL QL IA: POSITIVE
SODIUM SERPL-SCNC: 132 MMOL/L — LOW (ref 135–145)
WBC # BLD: 5.22 K/UL — SIGNIFICANT CHANGE UP (ref 3.8–10.5)
WBC # FLD AUTO: 5.22 K/UL — SIGNIFICANT CHANGE UP (ref 3.8–10.5)

## 2021-04-09 PROCEDURE — 99233 SBSQ HOSP IP/OBS HIGH 50: CPT

## 2021-04-09 PROCEDURE — 99232 SBSQ HOSP IP/OBS MODERATE 35: CPT

## 2021-04-09 RX ORDER — METOPROLOL TARTRATE 50 MG
100 TABLET ORAL DAILY
Refills: 0 | Status: DISCONTINUED | OUTPATIENT
Start: 2021-04-09 | End: 2021-04-10

## 2021-04-09 RX ADMIN — ATORVASTATIN CALCIUM 40 MILLIGRAM(S): 80 TABLET, FILM COATED ORAL at 22:07

## 2021-04-09 RX ADMIN — LISINOPRIL 40 MILLIGRAM(S): 2.5 TABLET ORAL at 04:55

## 2021-04-09 RX ADMIN — Medication 25 MILLIGRAM(S): at 04:55

## 2021-04-09 RX ADMIN — Medication 100 MILLIGRAM(S): at 17:04

## 2021-04-09 RX ADMIN — Medication 50 MILLIGRAM(S): at 04:55

## 2021-04-09 RX ADMIN — Medication 81 MILLIGRAM(S): at 16:42

## 2021-04-09 RX ADMIN — ERYTHROPOIETIN 10000 UNIT(S): 10000 INJECTION, SOLUTION INTRAVENOUS; SUBCUTANEOUS at 10:01

## 2021-04-09 RX ADMIN — HEPARIN SODIUM 5000 UNIT(S): 5000 INJECTION INTRAVENOUS; SUBCUTANEOUS at 04:54

## 2021-04-09 RX ADMIN — Medication 50 MICROGRAM(S): at 04:55

## 2021-04-09 RX ADMIN — HEPARIN SODIUM 5000 UNIT(S): 5000 INJECTION INTRAVENOUS; SUBCUTANEOUS at 22:06

## 2021-04-09 RX ADMIN — AMLODIPINE BESYLATE 10 MILLIGRAM(S): 2.5 TABLET ORAL at 04:54

## 2021-04-09 RX ADMIN — Medication 2: at 16:43

## 2021-04-09 RX ADMIN — Medication 2: at 09:25

## 2021-04-09 RX ADMIN — HEPARIN SODIUM 5000 UNIT(S): 5000 INJECTION INTRAVENOUS; SUBCUTANEOUS at 16:43

## 2021-04-09 NOTE — DISCHARGE NOTE PROVIDER - NSDCMRMEDTOKEN_GEN_ALL_CORE_FT
alendronate 70 mg oral tablet: 1 tab(s) orally once a week  amLODIPine 10 mg oral tablet: 1 tab(s) orally once a day  aspirin 81 mg oral delayed release tablet: 1 tab(s) orally once a day  atorvastatin 10 mg oral tablet: 1 tab(s) orally once a day  HumaLOG 100 units/mL subcutaneous solution: 10 unit(s) subcutaneous 2 times a day  hydrALAZINE 25 mg oral tablet: 1 tab(s) orally once a day  levothyroxine 50 mcg (0.05 mg) oral tablet: 1 tab(s) orally once a day  lisinopril 40 mg oral tablet: 1 tab(s) orally once a day  metoprolol succinate 50 mg oral tablet, extended release: 1 tab(s) orally once a day  traMADol 50 mg oral tablet: 1 tab(s) orally every 6 hours MDD:4tab   alendronate 70 mg oral tablet: 1 tab(s) orally once a week  amLODIPine 10 mg oral tablet: 1 tab(s) orally once a day  aspirin 81 mg oral delayed release tablet: 1 tab(s) orally once a day  cloNIDine 0.1 mg oral tablet: 1 tab(s) orally every 8 hours   hydrALAZINE 25 mg oral tablet: 1 tab(s) orally once a day  levothyroxine 50 mcg (0.05 mg) oral tablet: 1 tab(s) orally once a day  lisinopril 40 mg oral tablet: 1 tab(s) orally once a day  metoprolol succinate 50 mg oral tablet, extended release: 2 tab(s) orally once a day  traMADol 50 mg oral tablet: 1 tab(s) orally every 6 hours MDD:4tab

## 2021-04-09 NOTE — PHYSICAL THERAPY INITIAL EVALUATION ADULT - ADDITIONAL COMMENTS
pt reports she lives in her daughter's house with 8 steps to enter with one handrail + 10 steps to second floor with one handrail. pt reports she is able to ambulate independently with RW, required assistance with all ADLs. pt reports a HHA assists her 7 days a week from 8am to about 2pm, until her daughter comes home from work. reports her family assists her on the stairs at all times. DME includes RW and shower chair.

## 2021-04-09 NOTE — DISCHARGE NOTE PROVIDER - PROVIDER TOKENS
PROVIDER:[TOKEN:[[id number]],FOLLOWUP:[2 weeks]],PROVIDER:[TOKEN:[[id number]],FOLLOWUP:[2 weeks]],PROVIDER:[TOKEN:[[id number]],FOLLOWUP:[1 month]]

## 2021-04-09 NOTE — PROGRESS NOTE ADULT - SUBJECTIVE AND OBJECTIVE BOX
NEPHROLOGY INTERVAL HPI/OVERNIGHT EVENTS:    Seen at HD   Feels better   HR 74   Cardio and Ortho follow up noted     MEDICATIONS  (STANDING):  amLODIPine   Tablet 10 milliGRAM(s) Oral daily  aspirin enteric coated 81 milliGRAM(s) Oral daily  atorvastatin 40 milliGRAM(s) Oral at bedtime  dextrose 40% Gel 15 Gram(s) Oral once  dextrose 5%. 1000 milliLiter(s) (50 mL/Hr) IV Continuous <Continuous>  dextrose 5%. 1000 milliLiter(s) (100 mL/Hr) IV Continuous <Continuous>  dextrose 50% Injectable 25 Gram(s) IV Push once  dextrose 50% Injectable 12.5 Gram(s) IV Push once  dextrose 50% Injectable 25 Gram(s) IV Push once  epoetin gian-epbx (RETACRIT) Injectable 65895 Unit(s) IV Push <User Schedule>  glucagon  Injectable 1 milliGRAM(s) IntraMuscular once  heparin   Injectable 5000 Unit(s) SubCutaneous every 8 hours  hydrALAZINE 25 milliGRAM(s) Oral daily  insulin lispro (ADMELOG) corrective regimen sliding scale   SubCutaneous three times a day before meals  insulin lispro (ADMELOG) corrective regimen sliding scale   SubCutaneous at bedtime  levothyroxine 50 MICROGram(s) Oral daily  lisinopril 40 milliGRAM(s) Oral daily  metoprolol succinate ER 50 milliGRAM(s) Oral daily    MEDICATIONS  (PRN):      Allergies    eggs (Anaphylaxis)  No Known Drug Allergies    Intolerances          Vital Signs Last 24 Hrs  T(C): 36.6 (09 Apr 2021 07:41), Max: 37 (09 Apr 2021 04:19)  T(F): 97.8 (09 Apr 2021 07:41), Max: 98.6 (09 Apr 2021 04:19)  HR: 81 (09 Apr 2021 07:41) (75 - 83)  BP: 154/90 (09 Apr 2021 07:41) (151/85 - 180/82)  BP(mean): --  RR: 18 (09 Apr 2021 07:41) (18 - 19)  SpO2: 93% (09 Apr 2021 07:41) (91% - 93%)  Daily     Daily   I&O's Detail    I&O's Summary    NECK: Supple, No JVD  NERVOUS SYSTEM:  Alert & Oriented X3, intact and symmetric  CHEST/LUNG: Clear bilaterally  HEART: No rub  ABDOMEN: Soft, Nontender, Nondistended; +BS  EXTREMITIES:  2+ Peripheral Pulses, No LE edema    LABS:                        8.9    5.22  )-----------( 228      ( 09 Apr 2021 03:11 )             29.4     04-09    132<L>  |  87<L>  |  36.0<H>  ----------------------------<  229<H>  5.1   |  28.0  |  5.08<H>    Ca    8.2<L>      09 Apr 2021 03:11  Phos  3.6     04-08  Mg     2.2     04-08    TPro  8.0  /  Alb  4.0  /  TBili  0.7  /  DBili  x   /  AST  30  /  ALT  17  /  AlkPhos  303<H>  04-07    PT/INR - ( 07 Apr 2021 18:48 )   PT: 13.0 sec;   INR: 1.13 ratio         PTT - ( 07 Apr 2021 18:48 )  PTT:34.0 sec            RADIOLOGY & ADDITIONAL TESTS:

## 2021-04-09 NOTE — PROGRESS NOTE ADULT - ASSESSMENT
61 y/o female with PMH of ESRD on HD (M/W/F), hypothyroidism, PAfib (not on AC due to GIB), HTN/HLD was sent to the ED from dialysis center for back pain. Patient reported sharp pain that started in her upper back, radiated to her chest followed by palpitation; she said it felt as if "my heart want to jump out of my chest". Found to be in SVT in the ED. Given Adenosine 6mg once with good response.     SVT , resolved  S/p Adenosine once  Troponin: 0.55 (likely due to ESRD, patient with chronic elevated troponin) will trend    CTA no PE or dissection  tte TTE shows chronic low-normal LVEF;  Cardiology consulted, metoprolol increased to 100mg qd    Fall  Lumbar transverse process fractures  patient had fall while in ED, slipped and hit head  CTH no acute bleed  CTA showed New fractures of the left L1-L3 transverse processes  Neuro sx consulted,  No acute neurosurgical intervention warranted at this time, No further imaging warranted at this time/  No brace needed  PT eval  Follow up outpatient with Dr. Aj in 2-4 weeks    s/p fall Questionable left triquetral fracture  Ortho consulted  Velcro splint p  NWB L hand ok to use platform walker   F/u outpatient with Dr. Johnson    ESRD   HD on M/W/F   Renal consulted - HD today    PAfib   Not on AC due to GI bleed   Continue Aspirin 81mg   Metoprolol 100mg with holding parameters       Hypothyroidism   Synthroid 50mcg     HTN/HLD   Lipitor 40mg   Amlodipine 10mg   Hydralazine 25mg   Metoprolol 50mg with holding parameters   Lisinopril 40mg     DM-2   Insulin sliding scale     Supportive   DVT prophylaxis: Heparin sc  Diet: renal     Dispo: pending PT eval  Discussed with Daughter in Law Bhakti, dc pending PT eval 59 y/o female with PMH of ESRD on HD (M/W/F), hypothyroidism, PAfib (not on AC due to GIB), HTN/HLD was sent to the ED from dialysis center for back pain. Patient reported sharp pain that started in her upper back, radiated to her chest followed by palpitation; she said it felt as if "my heart want to jump out of my chest". Found to be in SVT in the ED. Given Adenosine 6mg once with good response.     SVT , resolved  S/p Adenosine once  Troponin: 0.55 (likely due to ESRD, patient with chronic elevated troponin) will trend    CTA no PE or dissection  tte TTE shows chronic low-normal LVEF;  Cardiology consulted, metoprolol increased to 100mg qd    Fall  Lumbar transverse process fractures  patient had fall while in ED, slipped and hit head  CTH no acute bleed  CTA showed New fractures of the left L1-L3 transverse processes  Neuro sx consulted,  No acute neurosurgical intervention warranted at this time, No further imaging warranted at this time/  No brace needed  PT eval  Follow up outpatient with Dr. Aj in 2-4 weeks    s/p fall Questionable left triquetral fracture  Ortho consulted  Velcro splint p  NWB L hand ok to use platform walker   F/u outpatient with Dr. Johnson    ESRD   HD on M/W/F   Renal consulted - HD today    PAfib   Not on AC due to GI bleed   Continue Aspirin 81mg   Metoprolol 100mg with holding parameters       Hypothyroidism   Synthroid 50mcg     HTN/HLD   Lipitor 40mg   Amlodipine 10mg   Hydralazine 25mg   Metoprolol 100mg with holding parameters   Lisinopril 40mg     DM-2   Insulin sliding scale     Supportive   DVT prophylaxis: Heparin sc  Diet: renal     Dispo: pending PT eval  Discussed with Daughter in Law Bhakti, dc pending PT eval

## 2021-04-09 NOTE — PHYSICAL THERAPY INITIAL EVALUATION ADULT - PERTINENT HX OF CURRENT PROBLEM, REHAB EVAL
59 y/o female with PMH of ESRD on HD (M/W/F), hypothyroidism, PAfib (not on AC due to GIB), HTN/HLD was sent to the ED from dialysis center for back pain. Patient reported sharp pain that started in her upper back, radiated to her chest followed by palpitation; she said it felt as if "my heart want to jump out of my chest". Found to be in SVT in the ED. pt now with fall yesterday and the fractures in the back and wrist.

## 2021-04-09 NOTE — DISCHARGE NOTE PROVIDER - CARE PROVIDER_API CALL
Hunter Aj; PhD)  Neurosurgery  [address] [place] [address]  Phone: [phone]  Fax: [phone]  Follow Up Time: 2 weeks    Maryjane Johnson)  Orthopedics  [address] [facility]  [place] [address]  Phone: [phone]  Fax: [phone]  Follow Up Time: 2 weeks    Yamilet Betancur)  Cardiovascular Disease; Internal Medicine; Nuclear Cardiology  [facility] [place] [address]  Phone: [phone]  Fax: [phone]  Follow Up Time: 1 month

## 2021-04-09 NOTE — PROGRESS NOTE ADULT - SUBJECTIVE AND OBJECTIVE BOX
Patient is a 60y old  Female who presents with a chief complaint of SVT (09 Apr 2021 10:22)        PAST MEDICAL & SURGICAL HISTORY:  DM (diabetes mellitus)    HTN (hypertension)    HLD (hyperlipidemia)    End stage renal disease on dialysis    Pericardial effusion    Pleural effusion    Chronic systolic congestive heart failure    Anemia due to chronic kidney disease, unspecified CKD stage    Paroxysmal atrial fibrillation    Coronary artery disease, angina presence unspecified, unspecified vessel or lesion type, unspecified whether native or transplanted heart    Diabetes    End stage renal disease    Encounter for dialysis catheter care    A-V fistula    A-V fistula        Summary of admission HPI:                PREVIOUS DIAGNOSTIC TESTING:      ECHO  FINDINGS:    STRESS  FINDINGS:    CATHETERIZATION  FINDINGS:    ELECTROPHYSIOLOGY STUDY  FINDINGS:    CAROTID ULTRASOUND:  FINDINGS    VENOUS DUPLEX SCAN:  FINDINGS:    CHEST CT PULMONARY ANGIO with IV Contrast:  FINDINGS:  MEDICATIONS  (STANDING):  amLODIPine   Tablet 10 milliGRAM(s) Oral daily  aspirin enteric coated 81 milliGRAM(s) Oral daily  atorvastatin 40 milliGRAM(s) Oral at bedtime  dextrose 40% Gel 15 Gram(s) Oral once  dextrose 5%. 1000 milliLiter(s) (50 mL/Hr) IV Continuous <Continuous>  dextrose 5%. 1000 milliLiter(s) (100 mL/Hr) IV Continuous <Continuous>  dextrose 50% Injectable 25 Gram(s) IV Push once  dextrose 50% Injectable 12.5 Gram(s) IV Push once  dextrose 50% Injectable 25 Gram(s) IV Push once  epoetin gian-epbx (RETACRIT) Injectable 21760 Unit(s) IV Push <User Schedule>  glucagon  Injectable 1 milliGRAM(s) IntraMuscular once  heparin   Injectable 5000 Unit(s) SubCutaneous every 8 hours  hydrALAZINE 25 milliGRAM(s) Oral daily  insulin lispro (ADMELOG) corrective regimen sliding scale   SubCutaneous three times a day before meals  insulin lispro (ADMELOG) corrective regimen sliding scale   SubCutaneous at bedtime  levothyroxine 50 MICROGram(s) Oral daily  lisinopril 40 milliGRAM(s) Oral daily  metoprolol succinate ER 50 milliGRAM(s) Oral daily    MEDICATIONS  (PRN):      FAMILY HISTORY:  Family history of kidney disease in brother (Sibling)        SOCIAL HISTORY:    CIGARETTES:    ALCOHOL:    REVIEW OF SYSTEMS:    CONSTITUTIONAL: No fever, weight loss, chills, shakes, or fatigue  EYES: No eye pain, visual disturbances, or discharge  ENMT:  No difficulty hearing, tinnitus, vertigo; No sinus or throat pain  NECK: No pain or stiffness  BREASTS: No pain, masses, or nipple discharge  RESPIRATORY: No cough, wheezing, hemoptysis, or shortness of breath  CARDIOVASCULAR: No chest pain, dyspnea, palpitations, dizziness, syncope, paroxysmal nocturnal dyspnea, orthopnea, or arm or leg swelling  GASTROINTESTINAL: No abdominal  or epigastric pain, nausea, vomiting, hematemesis, diarrhea, constipation, melena or bright red blood.  GENITOURINARY: No dysuria, nocturia, hematuria, or urinary incontinence  NEUROLOGICAL: No headaches, memory loss, slurred speech, limb weakness, loss of strength, numbness, or tremors  SKIN: No itching, burning, rashes, or lesions   LYMPH NODES: No enlarged glands  ENDOCRINE: No heat or cold intolerance, or hair loss  MUSCULOSKELETAL: No joint pain or swelling, muscle, back, or extremity pain  PSYCHIATRIC: No depression, anxiety, or difficulty sleeping  HEME/LYMPH: No easy bruising or bleeding gums  ALLERY AND IMMUNOLOGIC: No hives or rash.      Vital Signs Last 24 Hrs  T(C): 36.6 (09 Apr 2021 07:41), Max: 37.4 (08 Apr 2021 11:32)  T(F): 97.8 (09 Apr 2021 07:41), Max: 99.3 (08 Apr 2021 11:32)  HR: 81 (09 Apr 2021 07:41) (74 - 83)  BP: 154/90 (09 Apr 2021 07:41) (151/85 - 180/82)  BP(mean): --  RR: 18 (09 Apr 2021 07:41) (18 - 20)  SpO2: 93% (09 Apr 2021 07:41) (91% - 95%)    PHYSICAL EXAM:    GENERAL: In no apparent distress, well nourished, and hydrated.  HEAD:  Atraumatic, Normocephalic  EYES: EOMI, PERRLA, conjunctiva and sclera clear  ENMT: No tonsillar erythema, exudates, or enlargement; Moist mucous membranes, Good dentition, No lesions  NECK: Supple and normal thyroid.  No JVD or carotid bruit.  Carotid pulse is 2+ bilaterally.  HEART: Regular rate and rhythm; No murmurs, rubs, or gallops.  PULMONARY: Clear to auscultation and perfusion.  No rales, wheezing, or rhonchi bilaterally.  ABDOMEN: Soft, Nontender, Nondistended; Bowel sounds present  EXTREMITIES:  2+ Peripheral Pulses, No clubbing, cyanosis, or edema  LYMPH: No lymphadenopathy noted  NEUROLOGICAL: Grossly nonfocal          INTERPRETATION OF TELEMETRY:    ECG:    I&O's Detail      LABS:                        8.9    5.22  )-----------( 228      ( 09 Apr 2021 03:11 )             29.4     04-09    132<L>  |  87<L>  |  36.0<H>  ----------------------------<  229<H>  5.1   |  28.0  |  5.08<H>    Ca    8.2<L>      09 Apr 2021 03:11  Phos  3.6     04-08  Mg     2.2     04-08    TPro  8.0  /  Alb  4.0  /  TBili  0.7  /  DBili  x   /  AST  30  /  ALT  17  /  AlkPhos  303<H>  04-07    CARDIAC MARKERS ( 08 Apr 2021 03:15 )  x     / 0.54 ng/mL / x     / x     / x      CARDIAC MARKERS ( 07 Apr 2021 18:48 )  x     / 0.55 ng/mL / x     / x     / x          PT/INR - ( 07 Apr 2021 18:48 )   PT: 13.0 sec;   INR: 1.13 ratio         PTT - ( 07 Apr 2021 18:48 )  PTT:34.0 sec    BNPSerum Pro-Brain Natriuretic Peptide: >49668 pg/mL (04-08 @ 11:39)    I&O's Detail    Daily     Daily     RADIOLOGY & ADDITIONAL STUDIES: Patient is a 60y old  Female who presents with a chief complaint of SVT (09 Apr 2021 10:22)        PAST MEDICAL & SURGICAL HISTORY:  DM (diabetes mellitus)    HTN (hypertension)    HLD (hyperlipidemia)    End stage renal disease on dialysis    Pericardial effusion    Pleural effusion    Chronic systolic congestive heart failure    Anemia due to chronic kidney disease, unspecified CKD stage    Paroxysmal atrial fibrillation    Coronary artery disease, angina presence unspecified, unspecified vessel or lesion type, unspecified whether native or transplanted heart    Diabetes    End stage renal disease    Encounter for dialysis catheter care    A-V fistula    A-V fistula        CHEST CT PULMONARY ANGIO with IV Contrast:  FINDINGS:  MEDICATIONS  (STANDING):  amLODIPine   Tablet 10 milliGRAM(s) Oral daily  aspirin enteric coated 81 milliGRAM(s) Oral daily  atorvastatin 40 milliGRAM(s) Oral at bedtime  dextrose 40% Gel 15 Gram(s) Oral once  dextrose 5%. 1000 milliLiter(s) (50 mL/Hr) IV Continuous <Continuous>  dextrose 5%. 1000 milliLiter(s) (100 mL/Hr) IV Continuous <Continuous>  dextrose 50% Injectable 25 Gram(s) IV Push once  dextrose 50% Injectable 12.5 Gram(s) IV Push once  dextrose 50% Injectable 25 Gram(s) IV Push once  epoetin gian-epbx (RETACRIT) Injectable 16389 Unit(s) IV Push <User Schedule>  glucagon  Injectable 1 milliGRAM(s) IntraMuscular once  heparin   Injectable 5000 Unit(s) SubCutaneous every 8 hours  hydrALAZINE 25 milliGRAM(s) Oral daily  insulin lispro (ADMELOG) corrective regimen sliding scale   SubCutaneous three times a day before meals  insulin lispro (ADMELOG) corrective regimen sliding scale   SubCutaneous at bedtime  levothyroxine 50 MICROGram(s) Oral daily  lisinopril 40 milliGRAM(s) Oral daily  metoprolol succinate ER 50 milliGRAM(s) Oral daily    MEDICATIONS  (PRN):    Vital Signs Last 24 Hrs  T(C): 36.6 (09 Apr 2021 07:41), Max: 37.4 (08 Apr 2021 11:32)  T(F): 97.8 (09 Apr 2021 07:41), Max: 99.3 (08 Apr 2021 11:32)  HR: 81 (09 Apr 2021 07:41) (74 - 83)  BP: 154/90 (09 Apr 2021 07:41) (151/85 - 180/82)  BP(mean): --  RR: 18 (09 Apr 2021 07:41) (18 - 20)  SpO2: 93% (09 Apr 2021 07:41) (91% - 95%)    PHYSICAL EXAM:    PHYSICAL EXAM:  Appearance: Normal, well nourished	  HEENT:   Normal oral mucosa, PERRL, sclera non-icteric	  Lymphatic: No cervical lymphadenopathy  Cardiovascular: Normal S1 S2, No JVD,   Respiratory: Lungs clear to auscultation	  Psychiatry: A & O x 3, Mood & affect appropriate  Gastrointestinal:  Soft, Non-tender, + BS, no bruits	  Skin: No rashes, No ecchymoses, No cyanosis  Neurologic: Grossly non-focal with full strength in all four extremities  Extremities: Normal range of motion, No clubbing, cyanosis or edema  Vascular: Peripheral pulses palpable 2+ bilaterally      I&O's Detail      LABS:                        8.9    5.22  )-----------( 228      ( 09 Apr 2021 03:11 )             29.4     04-09    132<L>  |  87<L>  |  36.0<H>  ----------------------------<  229<H>  5.1   |  28.0  |  5.08<H>    Ca    8.2<L>      09 Apr 2021 03:11  Phos  3.6     04-08  Mg     2.2     04-08    TPro  8.0  /  Alb  4.0  /  TBili  0.7  /  DBili  x   /  AST  30  /  ALT  17  /  AlkPhos  303<H>  04-07    CARDIAC MARKERS ( 08 Apr 2021 03:15 )  x     / 0.54 ng/mL / x     / x     / x      CARDIAC MARKERS ( 07 Apr 2021 18:48 )  x     / 0.55 ng/mL / x     / x     / x          PT/INR - ( 07 Apr 2021 18:48 )   PT: 13.0 sec;   INR: 1.13 ratio         PTT - ( 07 Apr 2021 18:48 )  PTT:34.0 sec    BNPSerum Pro-Brain Natriuretic Peptide: >41900 pg/mL (04-08 @ 11:39)    I&O's Detail    Daily     Daily     RADIOLOGY & ADDITIONAL STUDIES:

## 2021-04-09 NOTE — PROGRESS NOTE ADULT - SUBJECTIVE AND OBJECTIVE BOX
INTERVAL HPI/OVERNIGHT EVENTS:  60y Female PMH ESRD on HD (M/W/F), hypothyroidism, PAfib (not on AC 2/2 GIB), HLD, HTN, sent to ED from dialysis center after patient c/o sharp back pain that radiated to her chest with palpitations, found to be in SVT in ED, course complicated by slip off of stretcher in ED complained of back pain again, found with acute L2, L3, L4 left transverse process fractures. Patient seen earlier this AM in dialysis, continues to have mild back pain, mildly improved    Vital Signs Last 24 Hrs  T(C): 36.6 (09 Apr 2021 07:41), Max: 37.4 (08 Apr 2021 11:32)  T(F): 97.8 (09 Apr 2021 07:41), Max: 99.3 (08 Apr 2021 11:32)  HR: 81 (09 Apr 2021 07:41) (74 - 83)  BP: 154/90 (09 Apr 2021 07:41) (151/85 - 180/82)  BP(mean): --  RR: 18 (09 Apr 2021 07:41) (18 - 20)  SpO2: 93% (09 Apr 2021 07:41) (91% - 95%)    PHYSICAL EXAM:  GENERAL: NAD, well-groomed, well-developed  HEAD: Atraumatic, normocephalic  MENTAL STATUS: Awake, alert, appropriately conversant without aphasia; following commands  CRANIAL NERVES: Tracks examiner. Face symmetric, normal smile. Hearing grossly intact. Speech clear.  MOTOR: 4/5 throughout, LUE limited 2/2 active dialysis session in place but handgrip good strength  SENSATION: grossly intact to light touch all extremities    LABS:                        8.9    5.22  )-----------( 228      ( 09 Apr 2021 03:11 )             29.4     04-09    132<L>  |  87<L>  |  36.0<H>  ----------------------------<  229<H>  5.1   |  28.0  |  5.08<H>    Ca    8.2<L>      09 Apr 2021 03:11  Phos  3.6     04-08  Mg     2.2     04-08    TPro  8.0  /  Alb  4.0  /  TBili  0.7  /  DBili  x   /  AST  30  /  ALT  17  /  AlkPhos  303<H>  04-07    PT/INR - ( 07 Apr 2021 18:48 )   PT: 13.0 sec;   INR: 1.13 ratio      PTT - ( 07 Apr 2021 18:48 )  PTT:34.0 sec    RADIOLOGY & ADDITIONAL TESTS:  CT Lumbar Spine Reform No Cont (04.08.21 @ 19:03)  IMPRESSION: Acute left L2, L3, L4 transverse process fractures. No vertebral body fracture. Consider MRI as clinically warranted    CT Thoracic Spine Reform No Cont (04.08.21 @ 19:03)  IMPRESSION: No acute fracture dislocation of the thoracic spine. Consider MRI as clinically warranted.

## 2021-04-09 NOTE — PROGRESS NOTE ADULT - ASSESSMENT
ESRD: admitted with SVT post dialysis   - HD today  - cont current Rx and monitor HR    Anemia: +CKD  - added MARGI at HD  - check Fe stores  - monitor H/H    RO: phos ok  - monitor off binders for now

## 2021-04-09 NOTE — DISCHARGE NOTE PROVIDER - HOSPITAL COURSE
61 y/o female with PMH of ESRD on HD (M/W/F), hypothyroidism, PAfib (not on AC due to GIB), HTN/HLD was sent to the ED from dialysis center for back pain. Patient reported sharp pain that started in her upper back, radiated to her chest followed by palpitation; she said it felt as if "my heart want to jump out of my chest". Found to be in SVT in the ED. Given Adenosine 6mg once with good response. She was seen by cardiology. Underwent TTE. CTA showed no PE and no aortic dissection. Metoprolol was increased. While in the hospital patient had a falls, she slipped on her slipper. She underwent CTH that showed no bleed but she was found to have acute - subacute lumbar transverse process fracture. Upon further questions patient does admit to some falls at home. Discussed with family these findings. Patient was seen by Neurosurgery who rec pain control and no neurosc intervention. No braces needed. Patient to follow up with neurosx in 2-3 weeks. Patient also complained of left wrist pain and found to have Questionable left triquetral fracture. Ortho was consulted and Velcro splint placed  NWB L hand ok to use platform walker. F/u outpatient with Dr. Johnson.   Patient seen by PT 59 y/o female with PMH of ESRD on HD (M/W/F), hypothyroidism, PAfib (not on AC due to GIB), HTN/HLD was sent to the ED from dialysis center for back pain. Patient reported sharp pain that started in her upper back, radiated to her chest followed by palpitation; she said it felt as if "my heart want to jump out of my chest". Found to be in SVT in the ED. Given Adenosine 6mg once with good response. She was seen by cardiology. Underwent TTE. CTA showed no PE and no aortic dissection. Metoprolol was increased. While in the hospital patient had a falls, she slipped on her slipper. She underwent CTH that showed no bleed but she was found to have acute - subacute lumbar transverse process fracture. Upon further questions patient does admit to some falls at home. Discussed with family these findings. Patient was seen by Neurosurgery who rec pain control and no neurosc intervention. No braces needed. Patient to follow up with neurosx in 2-3 weeks. Patient also complained of left wrist pain and found to have Questionable left triquetral fracture. Ortho was consulted and Velcro splint placed  NWB L hand ok to use platform walker. F/u outpatient with Dr. Johnson.   Patient seen by PT rec home w/home care with assist. Spoke with daughter, pt has 24 hr care. Home aids covers from 8AM-2PM and family care rest time.  discharge home   time spent>35 minutes.

## 2021-04-09 NOTE — DISCHARGE NOTE PROVIDER - NSDCCPCAREPLAN_GEN_ALL_CORE_FT
PRINCIPAL DISCHARGE DIAGNOSIS  Diagnosis: SVT (supraventricular tachycardia)  Assessment and Plan of Treatment: Metoprolol 100mg daily  Follow up with cardiology      SECONDARY DISCHARGE DIAGNOSES  Diagnosis: Lumbar transverse process fracture  Assessment and Plan of Treatment: Neuro sx consulted,  No acute neurosurgical intervention warranted at this time, No further imaging warranted at this time/  No brace needed  Follow up outpatient with Dr. Aj in 2-4 weeks    Diagnosis: Left wrist fracture  Assessment and Plan of Treatment: Velcro splint   NWB L hand ok to use platform walker   F/u outpatient with Dr. Johnson     PRINCIPAL DISCHARGE DIAGNOSIS  Diagnosis: SVT (supraventricular tachycardia)  Assessment and Plan of Treatment: Metoprolol 100mg daily  Follow up with cardiology      SECONDARY DISCHARGE DIAGNOSES  Diagnosis: Lumbar transverse process fracture  Assessment and Plan of Treatment: Neuro sx consulted,  No acute neurosurgical intervention warranted at this time, No further imaging warranted at this time/  No brace needed  Follow up outpatient with Dr. Aj in 2-4 weeks    Diagnosis: Left wrist fracture  Assessment and Plan of Treatment: Velcro splint   NWB L hand ok to use platform walker   F/u outpatient with Dr. Johnson    Diagnosis: HTN (hypertension)  Assessment and Plan of Treatment:

## 2021-04-09 NOTE — PROGRESS NOTE ADULT - ASSESSMENT
60y Female PMH ESRD on HD (M/W/F), hypothyroidism, PAfib (not on AC 2/2 GIB), HLD, HTN, sent to ED from dialysis center after patient c/o sharp back pain that radiated to her chest with palpitations, found to be in SVT in ED, course complicated by slip off of stretcher in ED complained of back pain again, found with acute L2, L3, L4 left transverse process fractures.  - CT L spine with acute L transverse process fractures L2-L4  - No focal neurologic deficits    PLAN:  - D/w Dr. Aj  - No acute neurosurgical intervention warranted at this time  - No further imaging warranted at this time  - No brace needed  - Recommend pain control  - PT/OT  - DVT ppx  - Supportive care/further medical management per primary team  - Follow up outpatient with Dr. Aj in 2-4 weeks  - Reconsult PRN

## 2021-04-09 NOTE — PROGRESS NOTE ADULT - ASSESSMENT
59 yo female with history of known NICM, s/p cath @ [facility] (06/2019) which revealed normal coronary arteries, EF - 35% with mild recovery of LV function on echo (02/2021)- 44%, trace MR, small chronic pericardial effusoin.. She has a history of HTN, DM, aortic lambl's excrescence (2018),  ESRD on HD & HLD. She was evaluated for paroxysmal afib (2019). She follows with Dr. Kidd, cardiologist. She had a 28 MCOT/event monitor that revealed no recurrent afib and has been maintained on aspirin. She presented to [facility] ER (04/07/2021) after developing back pain to chest post dialysis. In ER, she was found to be in SVT - 178 bpm wtih /63. She was treated with adenosine with conversion to NSR. She has borderline elevated troponin at 0.5 x2. She denies recurrent chest pain or syncope.       1.  SVT  2. Borderline elevated troponin I, likely secondary to demand in setting of or ESRD  3. Back to chest pain.   4. s/p CXR with congestive changes, r/o acute CHF, will check BNP level.     *04/09- TTE shows chronic low-normal LVEF; *telemetry shows no recurrence of SVT     PLAN:    1. Increase metoprolol succinate to 100 mg po qd  2. Patient OK for d/c from a cardiac perspective

## 2021-04-10 ENCOUNTER — TRANSCRIPTION ENCOUNTER (OUTPATIENT)
Age: 61
End: 2021-04-10

## 2021-04-10 VITALS
HEART RATE: 66 BPM | RESPIRATION RATE: 18 BRPM | SYSTOLIC BLOOD PRESSURE: 151 MMHG | DIASTOLIC BLOOD PRESSURE: 88 MMHG | TEMPERATURE: 98 F | OXYGEN SATURATION: 96 %

## 2021-04-10 LAB
ALBUMIN SERPL ELPH-MCNC: 3.6 G/DL — SIGNIFICANT CHANGE UP (ref 3.3–5.2)
ALP SERPL-CCNC: 238 U/L — HIGH (ref 40–120)
ALT FLD-CCNC: 10 U/L — SIGNIFICANT CHANGE UP
ANION GAP SERPL CALC-SCNC: 15 MMOL/L — SIGNIFICANT CHANGE UP (ref 5–17)
AST SERPL-CCNC: 17 U/L — SIGNIFICANT CHANGE UP
BILIRUB SERPL-MCNC: 0.5 MG/DL — SIGNIFICANT CHANGE UP (ref 0.4–2)
BUN SERPL-MCNC: 20 MG/DL — SIGNIFICANT CHANGE UP (ref 8–20)
CALCIUM SERPL-MCNC: 8.5 MG/DL — LOW (ref 8.6–10.2)
CHLORIDE SERPL-SCNC: 89 MMOL/L — LOW (ref 98–107)
CO2 SERPL-SCNC: 29 MMOL/L — SIGNIFICANT CHANGE UP (ref 22–29)
CREAT SERPL-MCNC: 3.81 MG/DL — HIGH (ref 0.5–1.3)
GLUCOSE BLDC GLUCOMTR-MCNC: 143 MG/DL — HIGH (ref 70–99)
GLUCOSE BLDC GLUCOMTR-MCNC: 172 MG/DL — HIGH (ref 70–99)
GLUCOSE SERPL-MCNC: 176 MG/DL — HIGH (ref 70–99)
HCT VFR BLD CALC: 29.2 % — LOW (ref 34.5–45)
HGB BLD-MCNC: 9.3 G/DL — LOW (ref 11.5–15.5)
MAGNESIUM SERPL-MCNC: 2.1 MG/DL — SIGNIFICANT CHANGE UP (ref 1.6–2.6)
MCHC RBC-ENTMCNC: 30.7 PG — SIGNIFICANT CHANGE UP (ref 27–34)
MCHC RBC-ENTMCNC: 31.8 GM/DL — LOW (ref 32–36)
MCV RBC AUTO: 96.4 FL — SIGNIFICANT CHANGE UP (ref 80–100)
PLATELET # BLD AUTO: 210 K/UL — SIGNIFICANT CHANGE UP (ref 150–400)
POTASSIUM SERPL-MCNC: 4.6 MMOL/L — SIGNIFICANT CHANGE UP (ref 3.5–5.3)
POTASSIUM SERPL-SCNC: 4.6 MMOL/L — SIGNIFICANT CHANGE UP (ref 3.5–5.3)
PROT SERPL-MCNC: 6.9 G/DL — SIGNIFICANT CHANGE UP (ref 6.6–8.7)
RBC # BLD: 3.03 M/UL — LOW (ref 3.8–5.2)
RBC # FLD: 16 % — HIGH (ref 10.3–14.5)
SODIUM SERPL-SCNC: 132 MMOL/L — LOW (ref 135–145)
WBC # BLD: 3.89 K/UL — SIGNIFICANT CHANGE UP (ref 3.8–10.5)
WBC # FLD AUTO: 3.89 K/UL — SIGNIFICANT CHANGE UP (ref 3.8–10.5)

## 2021-04-10 PROCEDURE — 80048 BASIC METABOLIC PNL TOTAL CA: CPT

## 2021-04-10 PROCEDURE — 84484 ASSAY OF TROPONIN QUANT: CPT

## 2021-04-10 PROCEDURE — 85025 COMPLETE CBC W/AUTO DIFF WBC: CPT

## 2021-04-10 PROCEDURE — 99285 EMERGENCY DEPT VISIT HI MDM: CPT | Mod: 25

## 2021-04-10 PROCEDURE — 99239 HOSP IP/OBS DSCHRG MGMT >30: CPT

## 2021-04-10 PROCEDURE — 72100 X-RAY EXAM L-S SPINE 2/3 VWS: CPT

## 2021-04-10 PROCEDURE — 85610 PROTHROMBIN TIME: CPT

## 2021-04-10 PROCEDURE — C8929: CPT

## 2021-04-10 PROCEDURE — 93005 ELECTROCARDIOGRAM TRACING: CPT

## 2021-04-10 PROCEDURE — U0005: CPT

## 2021-04-10 PROCEDURE — 73130 X-RAY EXAM OF HAND: CPT

## 2021-04-10 PROCEDURE — 36415 COLL VENOUS BLD VENIPUNCTURE: CPT

## 2021-04-10 PROCEDURE — 96374 THER/PROPH/DIAG INJ IV PUSH: CPT

## 2021-04-10 PROCEDURE — 86769 SARS-COV-2 COVID-19 ANTIBODY: CPT

## 2021-04-10 PROCEDURE — 83735 ASSAY OF MAGNESIUM: CPT

## 2021-04-10 PROCEDURE — 74174 CTA ABD&PLVS W/CONTRAST: CPT

## 2021-04-10 PROCEDURE — 85027 COMPLETE CBC AUTOMATED: CPT

## 2021-04-10 PROCEDURE — 97116 GAIT TRAINING THERAPY: CPT

## 2021-04-10 PROCEDURE — 71045 X-RAY EXAM CHEST 1 VIEW: CPT

## 2021-04-10 PROCEDURE — U0003: CPT

## 2021-04-10 PROCEDURE — 99261: CPT

## 2021-04-10 PROCEDURE — 73090 X-RAY EXAM OF FOREARM: CPT

## 2021-04-10 PROCEDURE — 73110 X-RAY EXAM OF WRIST: CPT

## 2021-04-10 PROCEDURE — 80053 COMPREHEN METABOLIC PANEL: CPT

## 2021-04-10 PROCEDURE — 71275 CT ANGIOGRAPHY CHEST: CPT

## 2021-04-10 PROCEDURE — 82962 GLUCOSE BLOOD TEST: CPT

## 2021-04-10 PROCEDURE — 97530 THERAPEUTIC ACTIVITIES: CPT

## 2021-04-10 PROCEDURE — 83880 ASSAY OF NATRIURETIC PEPTIDE: CPT

## 2021-04-10 PROCEDURE — 84100 ASSAY OF PHOSPHORUS: CPT

## 2021-04-10 PROCEDURE — 85730 THROMBOPLASTIN TIME PARTIAL: CPT

## 2021-04-10 PROCEDURE — 97163 PT EVAL HIGH COMPLEX 45 MIN: CPT

## 2021-04-10 PROCEDURE — 70450 CT HEAD/BRAIN W/O DYE: CPT

## 2021-04-10 RX ORDER — ATORVASTATIN CALCIUM 80 MG/1
1 TABLET, FILM COATED ORAL
Qty: 0 | Refills: 0 | DISCHARGE

## 2021-04-10 RX ORDER — INSULIN LISPRO 100/ML
10 VIAL (ML) SUBCUTANEOUS
Qty: 0 | Refills: 0 | DISCHARGE

## 2021-04-10 RX ORDER — METOPROLOL TARTRATE 50 MG
1 TABLET ORAL
Qty: 0 | Refills: 0 | DISCHARGE

## 2021-04-10 RX ORDER — ACETAMINOPHEN 500 MG
650 TABLET ORAL EVERY 6 HOURS
Refills: 0 | Status: DISCONTINUED | OUTPATIENT
Start: 2021-04-10 | End: 2021-04-10

## 2021-04-10 RX ADMIN — Medication 81 MILLIGRAM(S): at 08:54

## 2021-04-10 RX ADMIN — Medication 50 MICROGRAM(S): at 06:17

## 2021-04-10 RX ADMIN — AMLODIPINE BESYLATE 10 MILLIGRAM(S): 2.5 TABLET ORAL at 06:17

## 2021-04-10 RX ADMIN — HEPARIN SODIUM 5000 UNIT(S): 5000 INJECTION INTRAVENOUS; SUBCUTANEOUS at 06:17

## 2021-04-10 RX ADMIN — Medication 25 MILLIGRAM(S): at 06:19

## 2021-04-10 RX ADMIN — LISINOPRIL 40 MILLIGRAM(S): 2.5 TABLET ORAL at 06:17

## 2021-04-10 RX ADMIN — Medication 2: at 08:54

## 2021-04-10 RX ADMIN — Medication 100 MILLIGRAM(S): at 06:17

## 2021-04-10 NOTE — DISCHARGE NOTE NURSING/CASE MANAGEMENT/SOCIAL WORK - NSDCFUADDAPPT_GEN_ALL_CORE_FT
pcp one week- check BMP  resumption of HD at [facility] m/w/f and care and comfort ambulette.

## 2021-04-10 NOTE — PROGRESS NOTE ADULT - SUBJECTIVE AND OBJECTIVE BOX
NEPHROLOGY INTERVAL HPI/OVERNIGHT EVENTS:    No new events or complaints   (-) 2 Kg with HD         MEDICATIONS  (STANDING):  amLODIPine   Tablet 10 milliGRAM(s) Oral daily  aspirin enteric coated 81 milliGRAM(s) Oral daily  atorvastatin 40 milliGRAM(s) Oral at bedtime  dextrose 40% Gel 15 Gram(s) Oral once  dextrose 5%. 1000 milliLiter(s) (50 mL/Hr) IV Continuous <Continuous>  dextrose 5%. 1000 milliLiter(s) (100 mL/Hr) IV Continuous <Continuous>  dextrose 50% Injectable 25 Gram(s) IV Push once  dextrose 50% Injectable 12.5 Gram(s) IV Push once  dextrose 50% Injectable 25 Gram(s) IV Push once  epoetin gian-epbx (RETACRIT) Injectable 50644 Unit(s) IV Push <User Schedule>  glucagon  Injectable 1 milliGRAM(s) IntraMuscular once  heparin   Injectable 5000 Unit(s) SubCutaneous every 8 hours  hydrALAZINE 25 milliGRAM(s) Oral daily  insulin lispro (ADMELOG) corrective regimen sliding scale   SubCutaneous three times a day before meals  insulin lispro (ADMELOG) corrective regimen sliding scale   SubCutaneous at bedtime  levothyroxine 50 MICROGram(s) Oral daily  lisinopril 40 milliGRAM(s) Oral daily  metoprolol succinate  milliGRAM(s) Oral daily    MEDICATIONS  (PRN):      Allergies    eggs (Anaphylaxis)  No Known Drug Allergies    Intolerances    Vital Signs Last 24 Hrs  T(C): 36.8 (10 Apr 2021 06:00), Max: 37.1 (2021 11:49)  T(F): 98.3 (10 Apr 2021 06:00), Max: 98.8 (10 Apr 2021 01:11)  HR: 67 (10 Apr 2021 06:00) (67 - 85)  BP: 172/84 (10 Apr 2021 06:00) (143/83 - 172/84)  BP(mean): --  RR: 18 (10 Apr 2021 06:00) (18 - 20)  SpO2: 97% (10 Apr 2021 06:00) (93% - 100%)  Daily     Daily Weight in k.1 (10 Apr 2021 06:03)  I&O's Detail    2021 07:01  -  10 Apr 2021 07:00  --------------------------------------------------------  IN:    Oral Fluid: 480 mL  Total IN: 480 mL    OUT:    Other (mL): 2000 mL  Total OUT: 2000 mL    Total NET: -1520 mL        I&O's Summary    2021 07:01  -  10 Apr 2021 07:00  --------------------------------------------------------  IN: 480 mL / OUT: 2000 mL / NET: -1520 mL        PHYSICAL EXAM:  NECK: Supple, No JVD  NERVOUS SYSTEM:  Alert & Oriented X3, intact and symmetric  CHEST/LUNG: Clear bilaterally  HEART: No rub  ABDOMEN: Soft, Nontender, Nondistended; +BS  EXTREMITIES:  2+ Peripheral Pulses, No LE edema    LABS:                        9.3    3.89  )-----------( 210      ( 10 Apr 2021 07:25 )             29.2     04-10    132<L>  |  89<L>  |  20.0  ----------------------------<  176<H>  4.6   |  29.0  |  3.81<H>    Ca    8.5<L>      10 Apr 2021 07:25  Mg     2.1     -10    TPro  6.9  /  Alb  3.6  /  TBili  0.5  /  DBili  x   /  AST  17  /  ALT  10  /  AlkPhos  238<H>  04-10        Magnesium, Serum: 2.1 mg/dL (04-10 @ 07:25)          RADIOLOGY & ADDITIONAL TESTS:

## 2021-04-10 NOTE — PROGRESS NOTE ADULT - ASSESSMENT
59 y/o female with PMH of ESRD on HD (M/W/F), hypothyroidism, PAfib (not on AC due to GIB), HTN/HLD was sent to the ED from dialysis center for back pain. Patient reported sharp pain that started in her upper back, radiated to her chest followed by palpitation; she said it felt as if "my heart want to jump out of my chest". Found to be in SVT in the ED. Given Adenosine 6mg once with good response.     SVT , resolved  S/p Adenosine once  Troponin: 0.55 (likely due to ESRD, patient with chronic elevated troponin) will trend    CTA no PE or dissection  tte TTE shows chronic low-normal LVEF;  Cardiology consulted, metoprolol increased to 100mg qd    Fall  Lumbar transverse process fractures  patient had fall while in ED, slipped and hit head  CTH no acute bleed  CTA showed New fractures of the left L1-L3 transverse processes  Neuro sx consulted,  No acute neurosurgical intervention warranted at this time, No further imaging warranted at this time/  No brace needed  PT eval  Follow up outpatient with Dr. Aj in 2-4 weeks    s/p fall Questionable left triquetral fracture  Ortho consulted  Velcro splint p  NWB L hand ok to use platform walker   F/u outpatient with Dr. Johnson    ESRD   HD on M/W/F       PAfib   Not on AC due to GI bleed   Continue Aspirin 81mg   Metoprolol 100mg with holding parameters       Hypothyroidism   Synthroid 50mcg     HTN/HLD   BP higher side  add clonidin  Lipitor 40mg   Amlodipine 10mg   Hydralazine 25mg   Metoprolol 100mg with holding parameters   Lisinopril 40mg     DM-2   Insulin sliding scale     Supportive   DVT prophylaxis: Heparin sc  Diet: renal     Dispo:  PT eval rec home/home care with assist. dc plan today  Discussed with Daughter in Law Bahkti,

## 2021-04-10 NOTE — DISCHARGE NOTE NURSING/CASE MANAGEMENT/SOCIAL WORK - PATIENT PORTAL LINK FT
You can access the FollowMyHealth Patient Portal offered by [facility] by registering at the following website: [email or web address]. By joining [employer]’s FollowMyHealth portal, you will also be able to view your health information using other applications (apps) compatible with our system.

## 2021-04-10 NOTE — PROGRESS NOTE ADULT - SUBJECTIVE AND OBJECTIVE BOX
Patient is a 60y old  Female who presents with a chief complaint of SVT (10 Apr 2021 09:15)    Pt seen and exam. Communicate via .  Pt was c/o mild back pain which is stable now. Denies chest pain,sob,palpitation.    SUBJECTIVE / OVERNIGHT EVENTS:  REVIEW OF SYSTEMS: All systems are reviewed and found to be negative except above    MEDICATIONS  (STANDING):  amLODIPine   Tablet 10 milliGRAM(s) Oral daily  aspirin enteric coated 81 milliGRAM(s) Oral daily  atorvastatin 40 milliGRAM(s) Oral at bedtime  cloNIDine 0.1 milliGRAM(s) Oral two times a day  dextrose 40% Gel 15 Gram(s) Oral once  dextrose 5%. 1000 milliLiter(s) (50 mL/Hr) IV Continuous <Continuous>  dextrose 5%. 1000 milliLiter(s) (100 mL/Hr) IV Continuous <Continuous>  dextrose 50% Injectable 25 Gram(s) IV Push once  dextrose 50% Injectable 12.5 Gram(s) IV Push once  dextrose 50% Injectable 25 Gram(s) IV Push once  epoetin gian-epbx (RETACRIT) Injectable 57545 Unit(s) IV Push <User Schedule>  glucagon  Injectable 1 milliGRAM(s) IntraMuscular once  heparin   Injectable 5000 Unit(s) SubCutaneous every 8 hours  hydrALAZINE 25 milliGRAM(s) Oral daily  insulin lispro (ADMELOG) corrective regimen sliding scale   SubCutaneous three times a day before meals  insulin lispro (ADMELOG) corrective regimen sliding scale   SubCutaneous at bedtime  levothyroxine 50 MICROGram(s) Oral daily  lisinopril 40 milliGRAM(s) Oral daily  metoprolol succinate  milliGRAM(s) Oral daily    MEDICATIONS  (PRN):  acetaminophen    Suspension .. 650 milliGRAM(s) Oral every 6 hours PRN Severe Pain (7 - 10)      CAPILLARY BLOOD GLUCOSE      POCT Blood Glucose.: 172 mg/dL (10 Apr 2021 08:11)  POCT Blood Glucose.: 203 mg/dL (09 Apr 2021 22:09)  POCT Blood Glucose.: 200 mg/dL (09 Apr 2021 16:41)  POCT Blood Glucose.: 95 mg/dL (09 Apr 2021 12:24)    I&O's Summary    09 Apr 2021 07:01  -  10 Apr 2021 07:00  --------------------------------------------------------  IN: 480 mL / OUT: 2000 mL / NET: -1520 mL        PHYSICAL EXAM:  Vital Signs Last 24 Hrs  T(C): 36.8 (10 Apr 2021 08:31), Max: 37.1 (10 Apr 2021 01:11)  T(F): 98.2 (10 Apr 2021 08:31), Max: 98.8 (10 Apr 2021 01:11)  HR: 66 (10 Apr 2021 08:31) (66 - 85)  BP: 159/82 (10 Apr 2021 08:31) (159/82 - 172/84)  BP(mean): --  RR: 19 (10 Apr 2021 08:31) (18 - 20)  SpO2: 96% (10 Apr 2021 08:31) (93% - 97%)    CONSTITUTIONAL: NAD,  EYES: PERRLA; conjunctiva and sclera clear  ENMT: Moist oral mucosa,   RESPIRATORY: Normal respiratory effort; lungs are clear to auscultation bilaterally  CARDIOVASCULAR: Regular rate and rhythm, normal S1 and S2, no murmur   EXTS: No lower extremity edema; Peripheral pulses are 2+ bilaterally  ABDOMEN: Nontender to palpation, normoactive bowel sounds, no rebound/guarding;   MUSCLOSKELETAL:   no clubbing or cyanosis of digits; no joint swelling or tenderness to palpation. lumber area- rom stable, mild tender deep palpitation  PSYCH: affect appropriate  NEUROLOGY: A+O to person, place, and time; CN 2-12 are intact and symmetric; no gross sensory deficits;       LABS:                        9.3    3.89  )-----------( 210      ( 10 Apr 2021 07:25 )             29.2     04-10    132<L>  |  89<L>  |  20.0  ----------------------------<  176<H>  4.6   |  29.0  |  3.81<H>    Ca    8.5<L>      10 Apr 2021 07:25  Mg     2.1     04-10    TPro  6.9  /  Alb  3.6  /  TBili  0.5  /  DBili  x   /  AST  17  /  ALT  10  /  AlkPhos  238<H>  04-10                RADIOLOGY & ADDITIONAL TESTS:  Results Reviewed:   Imaging Personally Reviewed:  Electrocardiogram Personally Reviewed:    COORDINATION OF CARE:  Care Discussed with Consultants/Other Providers [Y/N]:  Prior or Outpatient Records Reviewed [Y/N]:

## 2021-04-10 NOTE — PROGRESS NOTE ADULT - SUBJECTIVE AND OBJECTIVE BOX
S: No CP/SOB    TELEMETRY: sr    MEDICATIONS  (STANDING):  amLODIPine   Tablet 10 milliGRAM(s) Oral daily  aspirin enteric coated 81 milliGRAM(s) Oral daily  atorvastatin 40 milliGRAM(s) Oral at bedtime  cloNIDine 0.1 milliGRAM(s) Oral two times a day  dextrose 40% Gel 15 Gram(s) Oral once  dextrose 5%. 1000 milliLiter(s) (50 mL/Hr) IV Continuous <Continuous>  dextrose 5%. 1000 milliLiter(s) (100 mL/Hr) IV Continuous <Continuous>  dextrose 50% Injectable 25 Gram(s) IV Push once  dextrose 50% Injectable 12.5 Gram(s) IV Push once  dextrose 50% Injectable 25 Gram(s) IV Push once  epoetin gian-epbx (RETACRIT) Injectable 33372 Unit(s) IV Push <User Schedule>  glucagon  Injectable 1 milliGRAM(s) IntraMuscular once  heparin   Injectable 5000 Unit(s) SubCutaneous every 8 hours  hydrALAZINE 25 milliGRAM(s) Oral daily  insulin lispro (ADMELOG) corrective regimen sliding scale   SubCutaneous three times a day before meals  insulin lispro (ADMELOG) corrective regimen sliding scale   SubCutaneous at bedtime  levothyroxine 50 MICROGram(s) Oral daily  lisinopril 40 milliGRAM(s) Oral daily  metoprolol succinate  milliGRAM(s) Oral daily    MEDICATIONS  (PRN):        Vital Signs Last 24 Hrs  T(C): 36.8 (10 Apr 2021 08:31), Max: 37.1 (2021 11:49)  T(F): 98.2 (10 Apr 2021 08:31), Max: 98.8 (10 Apr 2021 01:11)  HR: 66 (10 Apr 2021 08:31) (66 - 85)  BP: 159/82 (10 Apr 2021 08:31) (143/83 - 172/84)  BP(mean): --  RR: 19 (10 Apr 2021 08:31) (18 - 20)  SpO2: 96% (10 Apr 2021 08:31) (93% - 100%)    Daily     Daily Weight in k.1 (10 Apr 2021 06:03)    I&O's Detail    2021 07:01  -  10 Apr 2021 07:00  --------------------------------------------------------  IN:    Oral Fluid: 480 mL  Total IN: 480 mL    OUT:    Other (mL): 2000 mL  Total OUT: 2000 mL    Total NET: -1520 mL          PHYSICAL EXAM:  Appearance: Normal, well nourished	  Neck: No JVD,   Cardiovascular: Normal S1 S2  Respiratory: Lungs clear to auscultation	  Gastrointestinal:  Soft, Non-tender, + BS, no bruits	  Neurologic: Grossly non-focal.  Extremities: No edema    LABS:                        9.3    3.89  )-----------( 210      ( 10 Apr 2021 07:25 )             29.2     0410    132<L>  |  89<L>  |  20.0  ----------------------------<  176<H>  4.6   |  29.0  |  3.81<H>    Ca    8.5<L>      10 Apr 2021 07:25  Mg     2.1     04-10    TPro  6.9  /  Alb  3.6  /  TBili  0.5  /  DBili  x   /  AST  17  /  ALT  10  /  AlkPhos  238<H>  04-10            I&O's Summary    2021 07:01  -  10 2021 07:00  --------------------------------------------------------  IN: 480 mL / OUT: 2000 mL / NET: -1520 mL      BNP

## 2021-04-10 NOTE — PROGRESS NOTE ADULT - ASSESSMENT
61 yo female with history of known NICM, s/p cath @ [facility] (06/2019) which revealed normal coronary arteries, EF - 35% with mild recovery of LV function on echo (02/2021)- 44%, trace MR, small chronic pericardial effusoin.. She has a history of HTN, DM, aortic lambl's excrescence (2018),  ESRD on HD & HLD. She was evaluated for paroxysmal afib (2019) not on AC secondary to GIB    1.  SVT*telemetry shows no recurrence of SVT   2. Borderline elevated troponin I, likely secondary to demand in setting of or ESRD  3. Back to chest pain.   4. s/p CXR with congestive changes  5. 04/09- TTE shows chronic low-normal LVEF; EF - 40 - 45%, Moderate TR, Mod pulm HTN, chronic lambls excresence, RVSP - 52.5  6. BNO > 70,000 (04/08/2021) Acute on chronic HFpEF  .    PLAN:    1. Continue e metoprolol succinate to 100 mg po qd  2. Continue to keep negative fluid balance at dialysis.

## 2021-04-10 NOTE — PROGRESS NOTE ADULT - ASSESSMENT
ESRD: admitted with SVT post dialysis   - HD MWF  - cont current Rx and monitor HR    Anemia: +CKD  - added MARGI at HD  - follow Fe stores  - Hgb stable     RO: phos ok  - monitor off binders for now    HTN - Not ideal , add Clonidine 0.1 BID

## 2021-04-26 ENCOUNTER — LABORATORY RESULT (OUTPATIENT)
Age: 61
End: 2021-04-26

## 2021-04-27 ENCOUNTER — APPOINTMENT (OUTPATIENT)
Dept: INTERVENTIONAL RADIOLOGY/VASCULAR | Facility: CLINIC | Age: 61
End: 2021-04-27
Payer: MEDICARE

## 2021-04-27 ENCOUNTER — OUTPATIENT (OUTPATIENT)
Dept: OUTPATIENT SERVICES | Facility: HOSPITAL | Age: 61
LOS: 1 days | End: 2021-04-27

## 2021-04-27 VITALS
SYSTOLIC BLOOD PRESSURE: 180 MMHG | RESPIRATION RATE: 16 BRPM | TEMPERATURE: 98 F | HEART RATE: 71 BPM | OXYGEN SATURATION: 93 % | DIASTOLIC BLOOD PRESSURE: 84 MMHG

## 2021-04-27 DIAGNOSIS — Z00.8 ENCOUNTER FOR OTHER GENERAL EXAMINATION: ICD-10-CM

## 2021-04-27 DIAGNOSIS — Z49.01 ENCOUNTER FOR FITTING AND ADJUSTMENT OF EXTRACORPOREAL DIALYSIS CATHETER: Chronic | ICD-10-CM

## 2021-04-27 DIAGNOSIS — I77.0 ARTERIOVENOUS FISTULA, ACQUIRED: Chronic | ICD-10-CM

## 2021-04-27 LAB — SARS-COV-2 N GENE NPH QL NAA+PROBE: NOT DETECTED

## 2021-04-27 PROCEDURE — 49083 ABD PARACENTESIS W/IMAGING: CPT

## 2021-04-27 RX ORDER — INSULIN GLARGINE 100 [IU]/ML
INJECTION, SOLUTION SUBCUTANEOUS
Refills: 0 | Status: DISCONTINUED | COMMUNITY
End: 2021-04-27

## 2021-04-27 RX ORDER — PANTOPRAZOLE SODIUM 40 MG/1
40 TABLET, DELAYED RELEASE ORAL
Refills: 0 | Status: DISCONTINUED | COMMUNITY
End: 2021-04-27

## 2021-04-27 RX ORDER — SPIRONOLACTONE 25 MG/1
25 TABLET, FILM COATED ORAL
Refills: 0 | Status: DISCONTINUED | COMMUNITY
End: 2021-04-27

## 2021-04-27 RX ORDER — POLYETHYLENE GLYCOL 3350 17 G/17G
17 POWDER, FOR SOLUTION ORAL DAILY
Qty: 1 | Refills: 5 | Status: DISCONTINUED | COMMUNITY
Start: 2019-02-25 | End: 2021-04-27

## 2021-04-27 RX ORDER — AMLODIPINE BESYLATE 2.5 MG/1
2.5 TABLET ORAL
Refills: 0 | Status: DISCONTINUED | COMMUNITY
End: 2021-04-27

## 2021-04-27 RX ORDER — CARVEDILOL 25 MG/1
TABLET, FILM COATED ORAL
Refills: 0 | Status: DISCONTINUED | COMMUNITY
End: 2021-04-27

## 2021-04-27 RX ORDER — SACUBITRIL AND VALSARTAN 97; 103 MG/1; MG/1
97-103 TABLET, FILM COATED ORAL
Refills: 0 | Status: DISCONTINUED | COMMUNITY
End: 2021-04-27

## 2021-04-27 RX ORDER — LISINOPRIL 40 MG/1
40 TABLET ORAL
Refills: 0 | Status: DISCONTINUED | COMMUNITY
End: 2021-04-27

## 2021-04-27 NOTE — ASSESSMENT
[FreeTextEntry1] : PRE-PROCEDURE: H&P, relevant imaging and labs have been reviewed and updated where appropriate.  \par \par POST PROCEDURE NOTE - Paracentesis \par \par PRE-PROCEDURE DIAGNOSIS:  Ascites, renal failure\par \par POST PROCEDURE DIAGNOSIS:  Same \par \par SEDATION:  none \par \par ANESTHESIA: Lidocaine 1%  \par \par BLOOD LOSS: < 5 CC \par \par COMPLICATION: None \par \par PROCEDURE DETAILS:  \par \par Ultrasound guided. \par \par Site: Right lower quadrant  \par \par Volume: 6900cc  \par \par Color: clear \par \par Catheter size: 6F \par \par Dressing: Dermabond \par \par Specimens: No \par \par  \par \par FORMAL REPORT TO FOLLOW.\par

## 2021-04-27 NOTE — HISTORY OF PRESENT ILLNESS
[FreeTextEntry1] : PRE CALL PRIOR TO APPOINTMENT: \par \par Diagnosis: Ascites \par \par COVID-19 SWAB:  NEGATIVE \par \par RX on file:   yes\par \par Referring MD: Fredy Mandujano \par \par Appointment date: 4/27/2021 \par \par Clotting or Bleeding disorders: patient denies \par \par PPM / Defibrillator: patient denies  \par \par NPO status advised: yes\par \par History of fall: patient denies / walks with assistance     \par \par Assistant device for walking: yes\par \par  home: yes\par \par Blood Thinners: patient denies                              \par \par Prescribing MD agree to have blood thinner medication held: N/A\par \par Capacity to make decisions: Yes\par \par HCP: yes daughter \par \par DNR: NO, patient denies \par \par Person contact for Pre-Call: DONE by NELLA\par \par --------------------------------------------------------------------------------------------------------\par \par \par \par Nidhi- Operative Assessment: (Day of Procedure)\par \par NPO status: yes\par \par Falls risk: yes, yellow bracelet on  \par \par Labs: In chart, reviewed      \par \par IR MD: Dr. Leno Mahoney \par \par Urine Pregnancy: N/A\par \par PRE-OP instructions: YES BY NELLA\par \par POST-OP teaching initiated: YES BY DG\par \par Allergy bracelet on: YES \par \par Antibiotic given: NO\par

## 2021-04-28 LAB
BASOPHILS # BLD AUTO: 0.03 K/UL
BASOPHILS NFR BLD AUTO: 0.8 %
EOSINOPHIL # BLD AUTO: 0.12 K/UL
EOSINOPHIL NFR BLD AUTO: 3.3 %
HCT VFR BLD CALC: 33.7 %
HGB BLD-MCNC: 9.8 G/DL
IMM GRANULOCYTES NFR BLD AUTO: 0.3 %
LYMPHOCYTES # BLD AUTO: 0.68 K/UL
LYMPHOCYTES NFR BLD AUTO: 18.5 %
MAN DIFF?: NORMAL
MCHC RBC-ENTMCNC: 29.1 GM/DL
MCHC RBC-ENTMCNC: 30.2 PG
MCV RBC AUTO: 103.7 FL
MONOCYTES # BLD AUTO: 0.3 K/UL
MONOCYTES NFR BLD AUTO: 8.2 %
NEUTROPHILS # BLD AUTO: 2.53 K/UL
NEUTROPHILS NFR BLD AUTO: 68.9 %
PLATELET # BLD AUTO: 221 K/UL
RBC # BLD: 3.25 M/UL
RBC # FLD: 16.1 %
WBC # FLD AUTO: 3.67 K/UL

## 2021-05-10 LAB
BASOPHILS # BLD AUTO: 0.03 K/UL
BASOPHILS NFR BLD AUTO: 0.7 %
EOSINOPHIL # BLD AUTO: 0.14 K/UL
EOSINOPHIL NFR BLD AUTO: 3.1 %
HCT VFR BLD CALC: 35.1 %
HGB BLD-MCNC: 10.7 G/DL
IMM GRANULOCYTES NFR BLD AUTO: 0.2 %
INR PPP: 1.09 RATIO
LYMPHOCYTES # BLD AUTO: 0.62 K/UL
LYMPHOCYTES NFR BLD AUTO: 13.7 %
MAN DIFF?: NORMAL
MCHC RBC-ENTMCNC: 29.9 PG
MCHC RBC-ENTMCNC: 30.5 GM/DL
MCV RBC AUTO: 98 FL
MONOCYTES # BLD AUTO: 0.42 K/UL
MONOCYTES NFR BLD AUTO: 9.3 %
NEUTROPHILS # BLD AUTO: 3.3 K/UL
NEUTROPHILS NFR BLD AUTO: 73 %
PLATELET # BLD AUTO: 214 K/UL
PT BLD: 12.8 SEC
RBC # BLD: 3.58 M/UL
RBC # FLD: 14.7 %
SARS-COV-2 N GENE NPH QL NAA+PROBE: NOT DETECTED
WBC # FLD AUTO: 4.52 K/UL

## 2021-05-11 ENCOUNTER — APPOINTMENT (OUTPATIENT)
Dept: INTERVENTIONAL RADIOLOGY/VASCULAR | Facility: CLINIC | Age: 61
End: 2021-05-11
Payer: MEDICARE

## 2021-05-11 ENCOUNTER — OUTPATIENT (OUTPATIENT)
Dept: OUTPATIENT SERVICES | Facility: HOSPITAL | Age: 61
LOS: 1 days | End: 2021-05-11

## 2021-05-11 VITALS
TEMPERATURE: 97.8 F | SYSTOLIC BLOOD PRESSURE: 169 MMHG | OXYGEN SATURATION: 97 % | HEART RATE: 67 BPM | RESPIRATION RATE: 18 BRPM | DIASTOLIC BLOOD PRESSURE: 83 MMHG

## 2021-05-11 DIAGNOSIS — R18.8 OTHER ASCITES: ICD-10-CM

## 2021-05-11 DIAGNOSIS — I77.0 ARTERIOVENOUS FISTULA, ACQUIRED: Chronic | ICD-10-CM

## 2021-05-11 DIAGNOSIS — Z49.01 ENCOUNTER FOR FITTING AND ADJUSTMENT OF EXTRACORPOREAL DIALYSIS CATHETER: Chronic | ICD-10-CM

## 2021-05-11 PROCEDURE — 49083 ABD PARACENTESIS W/IMAGING: CPT

## 2021-05-11 NOTE — ASSESSMENT
[FreeTextEntry1] : PRE-PROCEDURE: H&P, relevant imaging and labs have been reviewed and updated where appropriate.  \par \par  \par \par POST PROCEDURE NOTE - Paracentesis \par \par PRE-PROCEDURE DIAGNOSIS:  Ascites, \par \par POST PROCEDURE DIAGNOSIS:  Same \par \par SEDATION:  none \par \par ANESTHESIA: Lidocaine 1%  \par \par BLOOD LOSS: < 5 CC \par \par COMPLICATION: None \par \par PROCEDURE DETAILS:  \par \par Ultrasound guided. \par \par Site: Right lower quadrant  \par \par Volume: 6000mL\par \par Color: clear \par \par Catheter size: 6F \par \par Dressing: Dermabond \par \par Specimens: No \par \par  \par \par FORMAL REPORT TO FOLLOW.\par

## 2021-05-11 NOTE — HISTORY OF PRESENT ILLNESS
[FreeTextEntry1] : PRE CALL PRIOR TO APPOINTMENT: \par \par Diagnosis: Ascites \par \par COVID-19 SWAB: NEGATIVE 5/8/2021\par \par RX on file: yes\par \par Referring MD: Fredy Mandujano \par \par Appointment date: 5/11/2021\par \par Clotting or Bleeding disorders: patient denies \par \par PPM / Defibrillator: patient denies \par \par NPO status advised: yes\par \par History of fall: patient denies / walks with assistance \par \par Assistant device for walking: yes\par \par  home: yes\par \par Blood Thinners: patient denies  \par \par Prescribing MD agree to have blood thinner medication held: N/A\par \par Capacity to make decisions: Yes\par \par HCP: yes daughter \par \par DNR: NO, patient denies \par \par Person contact for Pre-Call: DONE by NELLA\par \par --------------------------------------------------------------------------------------------------------\par \par \par \par Nidhi- Operative Assessment: (Day of Procedure)\par \par NPO status: yes\par \par Falls risk: yes, yellow bracelet on \par \par Labs: In chart, reviewed \par \par IR MD: Dr. Leno Mahoney \par \par Urine Pregnancy: N/A\par \par PRE-OP instructions: YES BY NELLA\par \par POST-OP teaching initiated: YES BY DG\par \par Allergy bracelet on: YES \par \par Antibiotic given: NO\par

## 2021-05-11 NOTE — REASON FOR VISIT
[Procedure: _________] : a [unfilled] procedure visit [Other: _____] : [unfilled] [FreeTextEntry1] : Therapeutic, Last done: 4/27/2021: 6900mL

## 2021-05-19 NOTE — INPATIENT CERTIFICATION FOR MEDICARE PATIENTS - IN ORDER TO MEET MEDICARE REQUIREMENTS.
5/19/2021      RE: Ashley Borden  [address] [place] [address]          05/19/21 1529   Child Life   Location Other (comments)  ([facility])   Intervention Initial Assessment;Preparation;Family Support   Preparation Comment CCLS introduced self and services to patient and patient's father and step-mother. Writer built rapport with patient and assessed patient's developmental needs, interests, and current coping. Patient appeared nervous,rubbing hands together, but warmed up to staff through supportive conversation. Patient assessed to demonstrate developmentally appropriate skills in communication and interests. Writer emphasized that if patient had questions at any point during visit that staff priority is answering patient questions to support patient coping.    Family Support Comment Father (Jose Armando) and step-mother (Fallon) present and supportive   Anxiety Appropriate   Techniques to [place] with Loss/Stress/Change family presence  (father and step-mother)   Special Interests Reading and art   Outcomes/Follow Up Continue to Follow/Support     Dipika Peterson, MARCO CNP  
In order to meet Medicare requirements, the clinical documentation must support the information cited in the admission order.  Please be sure to provide detailed and clear documentation about the following in the admitting note/history and physical:

## 2021-05-24 LAB — SARS-COV-2 N GENE NPH QL NAA+PROBE: NOT DETECTED

## 2021-05-24 NOTE — PROVIDER CONTACT NOTE (OTHER) - NAME OF MD/NP/PA/DO NOTIFIED:
Chiquis:    Patient is experiencing yeast infection symptoms and would like to know if you could send something into [facility] in [place]? KRIS Braswell

## 2021-05-25 ENCOUNTER — APPOINTMENT (OUTPATIENT)
Dept: INTERVENTIONAL RADIOLOGY/VASCULAR | Facility: CLINIC | Age: 61
End: 2021-05-25
Payer: MEDICARE

## 2021-05-25 ENCOUNTER — OUTPATIENT (OUTPATIENT)
Dept: OUTPATIENT SERVICES | Facility: HOSPITAL | Age: 61
LOS: 1 days | End: 2021-05-25

## 2021-05-25 VITALS
TEMPERATURE: 97.4 F | OXYGEN SATURATION: 97 % | DIASTOLIC BLOOD PRESSURE: 76 MMHG | RESPIRATION RATE: 24 BRPM | HEART RATE: 69 BPM | SYSTOLIC BLOOD PRESSURE: 161 MMHG

## 2021-05-25 DIAGNOSIS — Z49.01 ENCOUNTER FOR FITTING AND ADJUSTMENT OF EXTRACORPOREAL DIALYSIS CATHETER: Chronic | ICD-10-CM

## 2021-05-25 DIAGNOSIS — I77.0 ARTERIOVENOUS FISTULA, ACQUIRED: Chronic | ICD-10-CM

## 2021-05-25 DIAGNOSIS — Z00.8 ENCOUNTER FOR OTHER GENERAL EXAMINATION: ICD-10-CM

## 2021-05-25 PROCEDURE — 49083 ABD PARACENTESIS W/IMAGING: CPT

## 2021-05-25 NOTE — ASSESSMENT
[FreeTextEntry1] : PRE-PROCEDURE: H&P, relevant imaging and labs have been reviewed and updated where appropriate.  \par \par POST PROCEDURE NOTE - Paracentesis \par \par PRE-PROCEDURE DIAGNOSIS:  Ascites\par \par POST PROCEDURE DIAGNOSIS:  Same \par \par SEDATION:  none \par \par ANESTHESIA: Lidocaine 1%  \par \par BLOOD LOSS: < 5 CC \par \par COMPLICATION: None \par \par PROCEDURE DETAILS:  \par \par Ultrasound guided. \par \par Site: Right lower quadrant  \par \par Volume: 4700cc  \par \par Color: clear \par \par Catheter size: 6F \par \par Dressing: Dermabond \par \par Specimens: No \par \par  \par \par FORMAL REPORT TO FOLLOW.\par

## 2021-05-25 NOTE — REASON FOR VISIT
[Procedure: _________] : a [unfilled] procedure visit [Other: _____] : [unfilled] [FreeTextEntry1] : Last done: 5/11/2021 6000mL

## 2021-05-25 NOTE — HISTORY OF PRESENT ILLNESS
[FreeTextEntry1] : PRE CALL PRIOR TO APPOINTMENT: \par \par Diagnosis: Ascites \par \par COVID-19 SWAB: ON FILE- NEGATIVE \par \par RX on file: YES\par \par Referring MD: Fredy Mandujano \par \par Appointment date: 5/25/2021\par \par Clotting or Bleeding disorders: patient denies \par \par PPM / Defibrillator: patient denies \par \par NPO status advised: yes\par \par History of fall: patient denies / walks with assistance \par \par Assistant device for walking: yes\par \par  home: yes\par \par Blood Thinners: patient denies \par \par Prescribing MD agree to have blood thinner medication held: N/A\par \par Capacity to make decisions: Yes\par \par HCP: yes daughter \par \par DNR: NO, patient denies \par \par Person contact for Pre-Call: DONE by NELLA\par \par --------------------------------------------------------------------------------------------------------\par \par \par \par Nidhi- Operative Assessment: (Day of Procedure)\par \par NPO status: yes\par \par Falls risk: yes, yellow bracelet on \par \par Labs: In chart, reviewed \par \par IR MD: Dr. Leno Mahoney \par \par Urine Pregnancy: N/A\par \par PRE-OP instructions: YES BY NELLA\par \par POST-OP teaching initiated: YES BY NELLA\par \par Allergy bracelet on: YES \par \par Antibiotic given: NO\par

## 2021-06-01 NOTE — DISCHARGE NOTE ADULT - FUNCTIONAL STATUS DATE
16-May-2018 Minocycline Counseling: Patient advised regarding possible photosensitivity and discoloration of the teeth, skin, lips, tongue and gums.  Patient instructed to avoid sunlight, if possible.  When exposed to sunlight, patients should wear protective clothing, sunglasses, and sunscreen.  The patient was instructed to call the office immediately if the following severe adverse effects occur:  hearing changes, easy bruising/bleeding, severe headache, or vision changes.  The patient verbalized understanding of the proper use and possible adverse effects of minocycline.  All of the patient's questions and concerns were addressed.

## 2021-06-05 ENCOUNTER — LABORATORY RESULT (OUTPATIENT)
Age: 61
End: 2021-06-05

## 2021-06-06 LAB
BASOPHILS # BLD AUTO: 0.03 K/UL
BASOPHILS NFR BLD AUTO: 0.8 %
EOSINOPHIL # BLD AUTO: 0.16 K/UL
EOSINOPHIL NFR BLD AUTO: 4.4 %
HCT VFR BLD CALC: 33.1 %
HGB BLD-MCNC: 10 G/DL
IMM GRANULOCYTES NFR BLD AUTO: 0.3 %
INR PPP: 0.98 RATIO
LYMPHOCYTES # BLD AUTO: 0.47 K/UL
LYMPHOCYTES NFR BLD AUTO: 12.9 %
MAN DIFF?: NORMAL
MCHC RBC-ENTMCNC: 30.2 GM/DL
MCHC RBC-ENTMCNC: 30.5 PG
MCV RBC AUTO: 100.9 FL
MONOCYTES # BLD AUTO: 0.39 K/UL
MONOCYTES NFR BLD AUTO: 10.7 %
NEUTROPHILS # BLD AUTO: 2.59 K/UL
NEUTROPHILS NFR BLD AUTO: 70.9 %
PLATELET # BLD AUTO: 196 K/UL
PT BLD: 11.7 SEC
RBC # BLD: 3.28 M/UL
RBC # FLD: 14.8 %
SARS-COV-2 N GENE NPH QL NAA+PROBE: NOT DETECTED
WBC # FLD AUTO: 3.65 K/UL

## 2021-06-08 ENCOUNTER — APPOINTMENT (OUTPATIENT)
Dept: INTERVENTIONAL RADIOLOGY/VASCULAR | Facility: CLINIC | Age: 61
End: 2021-06-08
Payer: MEDICARE

## 2021-06-08 ENCOUNTER — OUTPATIENT (OUTPATIENT)
Dept: OUTPATIENT SERVICES | Facility: HOSPITAL | Age: 61
LOS: 1 days | End: 2021-06-08

## 2021-06-08 VITALS — HEART RATE: 67 BPM | TEMPERATURE: 98.1 F | RESPIRATION RATE: 18 BRPM | OXYGEN SATURATION: 98 %

## 2021-06-08 DIAGNOSIS — I77.0 ARTERIOVENOUS FISTULA, ACQUIRED: Chronic | ICD-10-CM

## 2021-06-08 DIAGNOSIS — Z49.01 ENCOUNTER FOR FITTING AND ADJUSTMENT OF EXTRACORPOREAL DIALYSIS CATHETER: Chronic | ICD-10-CM

## 2021-06-08 DIAGNOSIS — Z00.8 ENCOUNTER FOR OTHER GENERAL EXAMINATION: ICD-10-CM

## 2021-06-08 PROCEDURE — 49083 ABD PARACENTESIS W/IMAGING: CPT

## 2021-06-08 NOTE — HISTORY OF PRESENT ILLNESS
[FreeTextEntry1] : PRE CALL PRIOR TO APPOINTMENT: \par \par Diagnosis: Ascites \par \par COVID-19 SWAB: NEGATIVE ON FILE\par \par RX on file: YES\par \par Referring MD: Fredy Mandujano \par \par Appointment date: 6/8/2021\par \par Clotting or Bleeding disorders: patient denies \par \par PPM / Defibrillator: patient denies \par \par NPO status advised: yes\par \par History of fall: patient denies / walks with assistance \par \par Assistant device for walking: yes\par \par  home: yes\par \par Blood Thinners: patient denies \par \par Prescribing MD agree to have blood thinner medication held: N/A\par \par Capacity to make decisions: Yes\par \par HCP: yes daughter \par \par DNR: NO, patient denies \par \par Person contact for Pre-Call: DONE by NELLA\par \par --------------------------------------------------------------------------------------------------------\par \par \par \par Nidhi- Operative Assessment: (Day of Procedure)\par \par NPO status: yes\par \par Falls risk: yes, yellow bracelet on \par \par Labs: In chart, reviewed \par \par IR MD: Dr. Leno Mahoney \par \par Urine Pregnancy: N/A\par \par PRE-OP instructions: YES BY NELLA\par \par POST-OP teaching initiated: YES BY DG\par \par Allergy bracelet on: YES \par \par Antibiotic given: NO\par

## 2021-06-08 NOTE — ASSESSMENT
[FreeTextEntry1] : PRE-PROCEDURE: H&P, relevant imaging and labs have been reviewed and updated where appropriate.  \par \par consent obtained using a  \par \par POST PROCEDURE NOTE - Paracentesis \par \par PRE-PROCEDURE DIAGNOSIS:  Ascites, \par \par POST PROCEDURE DIAGNOSIS:  Same \par \par SEDATION:  none \par \par ANESTHESIA: Lidocaine 1%  \par \par BLOOD LOSS: < 5 CC \par \par COMPLICATION: None \par \par PROCEDURE DETAILS:  \par \par Ultrasound guided. \par \par Site: Right lower quadrant  \par \par Volume: 4750mL\par \par Color: clear \par \par Catheter size: 6F \par \par Dressing: Dermabond \par \par Specimens: No \par \par  \par \par FORMAL REPORT TO FOLLOW.\par

## 2021-06-08 NOTE — REASON FOR VISIT
[Procedure: _________] : a [unfilled] procedure visit [Other: _____] : [unfilled] [FreeTextEntry1] : LAST DONE: 5/25/2021 4700mL

## 2021-06-17 NOTE — INPATIENT CERTIFICATION FOR MEDICARE PATIENTS - PHYSICIAN CONCUR
Assessment:        1. Routine general medical examination at a health care facility     2. Obesity (BMI 35.0-39.9 without comorbidity)  Glycosylated Hemoglobin A1c    Lipid Cascade RANDOM    Thyroid Stimulating Hormone (TSH)    Glucose   3. Palpitations  HM2(CBC w/o Differential)   4. Breast pain, right  Mammo Diagnostic Right    US Breast Limited (Focal) Right   5. Breast lump  Mammo Diagnostic Right    US Breast Limited (Focal) Right       Plan:      1. Healthcare maintenance: She reports Pap smear is up-to-date.  Discussed that she is due for third dose of HPV vaccine.  She would like to check with insurance to verify coverage.  Will place future order and she can schedule nurse only for this.  Vaccines otherwise up-to-date.  We will check cholesterol and A1c today.  Encouraged regular exercise, healthy diet and weight loss efforts.  2. Obesity: Check A1c, lipids, glucose, TSH.  Encouraged regular exercise and weight loss.  3. Palpitations: Check hemogram and TSH.  She does not drink much caffeine.  Consider further evaluation with echocardiogram and Holter monitor if persistent palpitations occur   4. breast pain and right breast lump: Order placed for diagnostic mammogram and ultrasound          The following high BMI interventions were performed this visit: encouragement to exercise    Subjective:      Radha Dickerson is a 26 y.o. female who presents for an annual exam.  She is new to the clinic.  Reviewed her health history.  Overall healthy individual.  History of depression.  Treated with Lexapro for about 4 months.  Was not able to return to her former clinic for follow-up on medication so went off of it and has felt fine.  No recurrence of depressive symptoms.  She continues to see a therapist on a regular basis.  She has history of an ectopic pregnancy.  She also has history of cyst in her right breast.  Had ultrasound of right breast in the past which showed presence of benign cysts.  She reports  that over the past year her right breast has increased in size and has become more painful and she has noticed development of new lumps.  This causes her concern because there is strong family history of breast cancer on her father's side.  Has had several paternal aunts and cousins who have been diagnosed with breast cancer at an early age.  She takes no regular medications.  Reviewed her allergies.  She has had a tonsillectomy.  Family history is reviewed and entered into the chart.  Father with diabetes and high cholesterol.  She would like her A1c level checked today.  She is overweight.  Does not exercise regularly.  Generally eats healthy diet.  Gets regular dental exams.  She is single but is sexually active in a monogamous relationship.  No concerns for sexually transmitted infections.  Reports she had Pap smear about 6 months ago and declines pelvic exam today.  She has no children.  She works with children.  On review of systems she reports poor circulation.  She reports episodes of palpitations several times a day.  These last for a few seconds.  Feels some discomfort within her throat.  No associated lightheadedness, chest pain or dyspnea.  Bowel movements are normal.  Reports occasional sores in her mouth and in the genital area.  She wonders if this could be signs of a herpes infection.  Review of systems is assessed and is otherwise negative.  No other questions today.    Healthy Habits:   Regular Exercise: No  Sunscreen Use: Yes  Healthy Diet: Yes  Dental Visits Regularly: Yes  Seat Belt: Yes  Sexually active: Yes  Self Breast Exam Monthly:Yes  Hemoccults: N/A  Flex Sig: N/A  Colonoscopy: N/A  Lipid Profile: Yes  Glucose Screen: Yes  Prevention of Osteoporosis: Yes  Last Dexa: N/A  Guns at Home:  N/A      Immunization History   Administered Date(s) Administered     HPV 9 Valent 09/20/2016     HPV Quadrivalent 03/16/2012     Influenza,live, Nasal Laiv4 08/27/2013     Meningococcal MCV4P 03/16/2012      "Td, Adult, Absorbed 09/08/2004     Tdap 08/27/2013     Immunization status: due today. Will check with insurance on coverage      Gynecologic History  Patient's last menstrual period was 03/11/2018 (exact date).  Contraception: condoms  Last Pap: 2017. Results were: normal by report        OB History   No data available       No current outpatient prescriptions on file.     No current facility-administered medications for this visit.      Past Medical History:   Diagnosis Date     Ectopic pregnancy      Past Surgical History:   Procedure Laterality Date     TONSILLECTOMY       Grapefruit and Morphine  Family History   Problem Relation Age of Onset     Cancer Mother      skin     Alcohol abuse Mother      Diabetes Father      Hyperlipidemia Father      Breast cancer Paternal Aunt      Breast cancer Paternal Grandmother      Social History     Social History     Marital status: Single     Spouse name: N/A     Number of children: N/A     Years of education: N/A     Occupational History     Not on file.     Social History Main Topics     Smoking status: Never Smoker     Smokeless tobacco: Never Used     Alcohol use No     Drug use: No     Sexual activity: Yes     Partners: Male     Birth control/ protection: Condom     Other Topics Concern     Not on file     Social History Narrative     No narrative on file       Review of Systems      See HPI    Objective:         /82 (Patient Site: Right Arm, Patient Position: Sitting, Cuff Size: Adult Large)  Pulse 74  Temp 98.7  F (37.1  C) (Tympanic)   Ht 5' 4\" (1.626 m)  Wt (!) 227 lb 9 oz (103.2 kg)  LMP 03/11/2018 (Exact Date)  SpO2 99%  Breastfeeding? No  BMI 39.06 kg/m2      Physical Exam:  General Appearance: Alert, cooperative, no distress, appears stated age  Head: Normocephalic, without obvious abnormality, atraumatic  Eyes: PERRL, conjunctiva/corneas clear, EOM's intact  Ears: Normal TM's and external ear canals, both ears  Nose: Nares normal, septum " midline,mucosa normal, no drainage  Throat: Lips, mucosa, and tongue normal; teeth and gums normal  Neck: Supple, symmetrical, trachea midline, no adenopathy;  thyroid: not enlarged, symmetric, no tenderness/mass/nodules;  Back: Symmetric, no curvature, ROM normal  Lungs: Clear to auscultation bilaterally, respirations unlabored  Breasts: there Is generalized tenderness to palpation throughout the right breast but most pronounced in the superior aspect of the right breast.  There is a lump present at approximately the 5 o'clock position in the right breast.  Examination of the left breast is unremarkable  Heart: Regular rate and rhythm, S1 and S2 normal, no murmur, rub, or gallop, Abdomen: Soft, non-tender, bowel sounds active all four quadrants,  no masses, no organomegaly  Pelvic:Not examined  Extremities: Extremities normal, atraumatic, no cyanosis or edema  Skin: Skin color, texture, turgor normal, no rashes or lesions  Lymph nodes: Cervical, supraclavicular, and axillary nodes normal  Neurologic: Normal           I concur with the Admission Order and I certify that services are provided in accordance with Section 42 CFR § 412.3

## 2021-06-22 ENCOUNTER — APPOINTMENT (OUTPATIENT)
Dept: INTERVENTIONAL RADIOLOGY/VASCULAR | Facility: CLINIC | Age: 61
End: 2021-06-22
Payer: MEDICARE

## 2021-06-22 ENCOUNTER — OUTPATIENT (OUTPATIENT)
Dept: OUTPATIENT SERVICES | Facility: HOSPITAL | Age: 61
LOS: 1 days | End: 2021-06-22

## 2021-06-22 VITALS
DIASTOLIC BLOOD PRESSURE: 78 MMHG | TEMPERATURE: 98.2 F | OXYGEN SATURATION: 99 % | HEART RATE: 69 BPM | SYSTOLIC BLOOD PRESSURE: 161 MMHG | RESPIRATION RATE: 16 BRPM

## 2021-06-22 DIAGNOSIS — I77.0 ARTERIOVENOUS FISTULA, ACQUIRED: Chronic | ICD-10-CM

## 2021-06-22 DIAGNOSIS — Z00.8 ENCOUNTER FOR OTHER GENERAL EXAMINATION: ICD-10-CM

## 2021-06-22 DIAGNOSIS — Z49.01 ENCOUNTER FOR FITTING AND ADJUSTMENT OF EXTRACORPOREAL DIALYSIS CATHETER: Chronic | ICD-10-CM

## 2021-06-22 LAB — SARS-COV-2 N GENE NPH QL NAA+PROBE: NOT DETECTED

## 2021-06-22 PROCEDURE — 49083 ABD PARACENTESIS W/IMAGING: CPT

## 2021-06-22 NOTE — HISTORY OF PRESENT ILLNESS
[FreeTextEntry1] : PRE CALL PRIOR TO APPOINTMENT: \par \par Diagnosis: Ascites \par \par COVID-19 SWAB: NEGATIVE ON FILE\par \par RX on file: YES\par \par Referring MD: Fredy Mandujano \par \par Appointment date: 6/8/2021\par \par Clotting or Bleeding disorders: patient denies \par \par PPM / Defibrillator: patient denies \par \par NPO status advised: yes\par \par History of fall: patient denies / walks with assistance \par \par Assistant device for walking: yes\par \par  home: yes\par \par Blood Thinners: patient denies \par \par Prescribing MD agree to have blood thinner medication held: N/A\par \par Capacity to make decisions: Yes\par \par HCP: yes daughter \par \par DNR: NO, patient denies \par \par Person contact for Pre-Call: DONE by NELLA\par \par --------------------------------------------------------------------------------------------------------\par \par \par \par Nidhi- Operative Assessment: (Day of Procedure)\par \par NPO status: yes\par \par Falls risk: yes, yellow bracelet on \par \par Labs: In chart, reviewed \par \par IR MD: Dr. Leno Mahoney \par \par Urine Pregnancy: N/A\par \par PRE-OP instructions: YES BY NELLA\par \par POST-OP teaching initiated: YES BY NELLA\par \par Allergy bracelet on: YES \par \par Antibiotic given: NO

## 2021-06-22 NOTE — REASON FOR VISIT
[Procedure: _________] : a [unfilled] procedure visit [Other: _____] : [unfilled] [FreeTextEntry1] : Diagnosis: Ascites / ESRD   LAST DONE: 6/8/2021 4750mL

## 2021-07-06 ENCOUNTER — APPOINTMENT (OUTPATIENT)
Dept: INTERVENTIONAL RADIOLOGY/VASCULAR | Facility: CLINIC | Age: 61
End: 2021-07-06
Payer: MEDICARE

## 2021-07-06 ENCOUNTER — OUTPATIENT (OUTPATIENT)
Dept: OUTPATIENT SERVICES | Facility: HOSPITAL | Age: 61
LOS: 1 days | End: 2021-07-06

## 2021-07-06 VITALS
HEART RATE: 71 BPM | OXYGEN SATURATION: 99 % | DIASTOLIC BLOOD PRESSURE: 77 MMHG | SYSTOLIC BLOOD PRESSURE: 165 MMHG | TEMPERATURE: 98.1 F | RESPIRATION RATE: 20 BRPM

## 2021-07-06 DIAGNOSIS — Z49.01 ENCOUNTER FOR FITTING AND ADJUSTMENT OF EXTRACORPOREAL DIALYSIS CATHETER: Chronic | ICD-10-CM

## 2021-07-06 DIAGNOSIS — Z00.8 ENCOUNTER FOR OTHER GENERAL EXAMINATION: ICD-10-CM

## 2021-07-06 DIAGNOSIS — I77.0 ARTERIOVENOUS FISTULA, ACQUIRED: Chronic | ICD-10-CM

## 2021-07-06 LAB
BASOPHILS # BLD AUTO: 0.03 K/UL
BASOPHILS NFR BLD AUTO: 0.7 %
EOSINOPHIL # BLD AUTO: 0.17 K/UL
EOSINOPHIL NFR BLD AUTO: 4 %
HCT VFR BLD CALC: 34.3 %
HGB BLD-MCNC: 10.6 G/DL
IMM GRANULOCYTES NFR BLD AUTO: 0.2 %
INR PPP: 1.04 RATIO
LYMPHOCYTES # BLD AUTO: 0.55 K/UL
LYMPHOCYTES NFR BLD AUTO: 12.9 %
MAN DIFF?: NORMAL
MCHC RBC-ENTMCNC: 30.1 PG
MCHC RBC-ENTMCNC: 30.9 GM/DL
MCV RBC AUTO: 97.4 FL
MONOCYTES # BLD AUTO: 0.51 K/UL
MONOCYTES NFR BLD AUTO: 12 %
NEUTROPHILS # BLD AUTO: 2.98 K/UL
NEUTROPHILS NFR BLD AUTO: 70.2 %
PLATELET # BLD AUTO: 242 K/UL
PT BLD: 12.2 SEC
RBC # BLD: 3.52 M/UL
RBC # FLD: 15.4 %
SARS-COV-2 N GENE NPH QL NAA+PROBE: NOT DETECTED
WBC # FLD AUTO: 4.25 K/UL

## 2021-07-06 PROCEDURE — 49083 ABD PARACENTESIS W/IMAGING: CPT

## 2021-07-06 NOTE — REASON FOR VISIT
[Procedure: _________] : a [unfilled] procedure visit [FreeTextEntry1] : Diagnosis: Ascites - Last done: 6/22/2021 4850mL

## 2021-07-06 NOTE — ASSESSMENT
[FreeTextEntry1] : 4450 cc clear fluid\par \par monocryl and dermabond\par \par FORMAL REPORT TO FOLLOW \par

## 2021-07-06 NOTE — HISTORY OF PRESENT ILLNESS
[FreeTextEntry1] : PRE CALL PRIOR TO APPOINTMENT: \par \par Diagnosis: Ascites \par \par COVID-19 SWAB: \par \par RX on file: YES\par \par Referring MD: Fredy Mandujano \par \par Clotting or Bleeding disorders: patient denies \par \par PPM / Defibrillator: patient denies \par \par NPO status advised: yes\par \par History of fall: patient denies / walks with assistance \par \par Assistant device for walking: yes\par \par  home: yes\par \par Blood Thinners: patient denies \par \par Prescribing MD agree to have blood thinner medication held: N/A\par \par Capacity to make decisions: Yes\par \par HCP: yes daughter \par \par DNR: NO, patient denies \par \par Person contact for Pre-Call: DONE by NELLA\par \par --------------------------------------------------------------------------------------------------------\par \par \par \par Nidhi- Operative Assessment: (Day of Procedure)\par \par NPO status: yes\par \par Falls risk: yes, yellow bracelet on \par \par Labs: Going 7/2/2021 \par \par IR MD: Dr. Leno Mahoney \par \par Urine Pregnancy: N/A\par \par PRE-OP instructions: YES BY NELLA\par \par POST-OP teaching initiated: YES BY DG\par \par Allergy bracelet on: YES \par \par Antibiotic given: NO \par

## 2021-07-20 ENCOUNTER — OUTPATIENT (OUTPATIENT)
Dept: OUTPATIENT SERVICES | Facility: HOSPITAL | Age: 61
LOS: 1 days | End: 2021-07-20

## 2021-07-20 ENCOUNTER — APPOINTMENT (OUTPATIENT)
Dept: INTERVENTIONAL RADIOLOGY/VASCULAR | Facility: CLINIC | Age: 61
End: 2021-07-20
Payer: MEDICARE

## 2021-07-20 VITALS
SYSTOLIC BLOOD PRESSURE: 176 MMHG | TEMPERATURE: 98 F | HEART RATE: 72 BPM | DIASTOLIC BLOOD PRESSURE: 68 MMHG | OXYGEN SATURATION: 100 % | RESPIRATION RATE: 18 BRPM

## 2021-07-20 DIAGNOSIS — Z00.8 ENCOUNTER FOR OTHER GENERAL EXAMINATION: ICD-10-CM

## 2021-07-20 DIAGNOSIS — I77.0 ARTERIOVENOUS FISTULA, ACQUIRED: Chronic | ICD-10-CM

## 2021-07-20 DIAGNOSIS — Z49.01 ENCOUNTER FOR FITTING AND ADJUSTMENT OF EXTRACORPOREAL DIALYSIS CATHETER: Chronic | ICD-10-CM

## 2021-07-20 PROCEDURE — 49083 ABD PARACENTESIS W/IMAGING: CPT

## 2021-07-20 NOTE — REASON FOR VISIT
[Procedure: _________] : a [unfilled] procedure visit [FreeTextEntry1] : Diagnosis: Acites - Last done 7/6/2021- 8449mL

## 2021-07-30 NOTE — PATIENT PROFILE ADULT. - FALLEN IN THE PAST
PROCEDURE:  Duplex Ext Veins Right

 

INDICATIONS:  THROMBOSIS/EMBOLISM OF VEIN

 

TECHNIQUE:  

Real-time imaging, as well as color and pulse Doppler interrogation, were performed of the lower extr
emity deep veins from the inguinal ligament to the popliteal fossa.  

 

COMPARISON:  Duplex ultrasound 7/7/2021

 

FINDINGS:  A short segment of occlusive thrombus is seen in one of two paired right popliteal veins. 
The calf veins are not well visualized, but may contain nonocclusive thrombus on gray scale images. T
he deep veins are otherwise normally compressible, and free of intraluminal thrombus.  Color and puls
e Doppler demonstrate normal phasic intraluminal flow.  There is normal augmentation response to dist
al compression maneuver.  

 

A right inguinal lymph node is seen measuring 2.1 x 1.0 x 1.9 cm with a cortex of 0.3 cm, similar whe
n compared to the prior ultrasound from 7/7/2021.

 

IMPRESSION:  

1.  Persistent short segment occlusive deep venous thrombosis in one of two paired right popliteal ve
ins, not significantly changed when compared to the radiographs from 7/7/2021.

2.  Right calf veins are poorly visualized, but likely contain nonocclusive thrombus similar to the p
rior exam.

 

Reviewed by: Apolinar Jackson MD on 7/30/2021 11:08 AM PDT

Approved by: Apolinar Jackson MD on 7/30/2021 11:08 AM PDT

 

 

Station ID:  [id number]

## 2021-08-02 LAB
BASOPHILS # BLD AUTO: 0.03 K/UL
BASOPHILS NFR BLD AUTO: 0.6 %
EOSINOPHIL # BLD AUTO: 0.15 K/UL
EOSINOPHIL NFR BLD AUTO: 3.1 %
HCT VFR BLD CALC: 35.8 %
HGB BLD-MCNC: 10.4 G/DL
IMM GRANULOCYTES NFR BLD AUTO: 0.2 %
INR PPP: 1.03 RATIO
LYMPHOCYTES # BLD AUTO: 0.61 K/UL
LYMPHOCYTES NFR BLD AUTO: 12.7 %
MAN DIFF?: NORMAL
MCHC RBC-ENTMCNC: 29.1 GM/DL
MCHC RBC-ENTMCNC: 29.1 PG
MCV RBC AUTO: 100.3 FL
MONOCYTES # BLD AUTO: 0.43 K/UL
MONOCYTES NFR BLD AUTO: 9 %
NEUTROPHILS # BLD AUTO: 3.56 K/UL
NEUTROPHILS NFR BLD AUTO: 74.4 %
PLATELET # BLD AUTO: 253 K/UL
PT BLD: 12.2 SEC
RBC # BLD: 3.57 M/UL
RBC # FLD: 15.6 %
SARS-COV-2 N GENE NPH QL NAA+PROBE: NOT DETECTED
WBC # FLD AUTO: 4.79 K/UL

## 2021-08-03 ENCOUNTER — APPOINTMENT (OUTPATIENT)
Dept: INTERVENTIONAL RADIOLOGY/VASCULAR | Facility: CLINIC | Age: 61
End: 2021-08-03
Payer: MEDICARE

## 2021-08-03 ENCOUNTER — OUTPATIENT (OUTPATIENT)
Dept: OUTPATIENT SERVICES | Facility: HOSPITAL | Age: 61
LOS: 1 days | End: 2021-08-03

## 2021-08-03 VITALS
TEMPERATURE: 97 F | SYSTOLIC BLOOD PRESSURE: 160 MMHG | DIASTOLIC BLOOD PRESSURE: 103 MMHG | OXYGEN SATURATION: 99 % | RESPIRATION RATE: 19 BRPM | HEART RATE: 73 BPM

## 2021-08-03 DIAGNOSIS — Z00.8 ENCOUNTER FOR OTHER GENERAL EXAMINATION: ICD-10-CM

## 2021-08-03 DIAGNOSIS — I77.0 ARTERIOVENOUS FISTULA, ACQUIRED: Chronic | ICD-10-CM

## 2021-08-03 DIAGNOSIS — Z49.01 ENCOUNTER FOR FITTING AND ADJUSTMENT OF EXTRACORPOREAL DIALYSIS CATHETER: Chronic | ICD-10-CM

## 2021-08-03 PROCEDURE — 49083 ABD PARACENTESIS W/IMAGING: CPT

## 2021-08-03 NOTE — REASON FOR VISIT
[Procedure: _________] : a [unfilled] procedure visit [FreeTextEntry1] : Diagnosis: Ascites - Last done 7/20/2021- 4250mL

## 2021-08-03 NOTE — HISTORY OF PRESENT ILLNESS
[FreeTextEntry1] : PRE CALL PRIOR TO APPOINTMENT: \par \par Diagnosis: Ascites \par \par COVID-19 SWAB: NEGATIVE \par \par RX on file: YES\par \par Referring MD: Fredy Mandujano \par \par Clotting or Bleeding disorders: patient denies \par \par PPM / Defibrillator: patient denies \par \par NPO status advised: yes\par \par History of fall: patient denies / walks with assistance \par \par Assistant device for walking: yes\par \par  home: yes\par \par Blood Thinners: patient denies \par \par Prescribing MD agree to have blood thinner medication held: N/A\par \par Capacity to make decisions: Yes\par \par HCP: yes daughter \par \par DNR: NO, patient denies \par \par Person contact for Pre-Call: DONE by NELLA\par \par --------------------------------------------------------------------------------------------------------\par \par \par \par Nidhi- Operative Assessment: (Day of Procedure)\par \par NPO status: yes\par \par Falls risk: yes, yellow bracelet on \par \par Labs: IN CHART - REVIEWED \par \par IR MD: Dr. Leno Mahoney \par \par Urine Pregnancy: N/A\par \par PRE-OP instructions: YES BY NELLA\par \par POST-OP teaching initiated: YES BY DG\par \par Allergy bracelet on: YES \par \par Antibiotic given: NO

## 2021-08-16 LAB — SARS-COV-2 N GENE NPH QL NAA+PROBE: NOT DETECTED

## 2021-08-17 ENCOUNTER — APPOINTMENT (OUTPATIENT)
Dept: INTERVENTIONAL RADIOLOGY/VASCULAR | Facility: CLINIC | Age: 61
End: 2021-08-17
Payer: MEDICARE

## 2021-08-17 ENCOUNTER — OUTPATIENT (OUTPATIENT)
Dept: OUTPATIENT SERVICES | Facility: HOSPITAL | Age: 61
LOS: 1 days | End: 2021-08-17

## 2021-08-17 VITALS
TEMPERATURE: 97.6 F | RESPIRATION RATE: 20 BRPM | DIASTOLIC BLOOD PRESSURE: 91 MMHG | OXYGEN SATURATION: 96 % | HEART RATE: 86 BPM | SYSTOLIC BLOOD PRESSURE: 157 MMHG

## 2021-08-17 DIAGNOSIS — Z00.00 ENCOUNTER FOR GENERAL ADULT MEDICAL EXAMINATION WITHOUT ABNORMAL FINDINGS: ICD-10-CM

## 2021-08-17 DIAGNOSIS — I77.0 ARTERIOVENOUS FISTULA, ACQUIRED: Chronic | ICD-10-CM

## 2021-08-17 DIAGNOSIS — Z49.01 ENCOUNTER FOR FITTING AND ADJUSTMENT OF EXTRACORPOREAL DIALYSIS CATHETER: Chronic | ICD-10-CM

## 2021-08-17 PROCEDURE — 49083 ABD PARACENTESIS W/IMAGING: CPT

## 2021-08-17 NOTE — REASON FOR VISIT
[Procedure: _________] : a [unfilled] procedure visit [FreeTextEntry1] : Diagnosis: Acsites- Last done 8/3/2021 4000mL

## 2021-08-24 ENCOUNTER — EMERGENCY (EMERGENCY)
Facility: HOSPITAL | Age: 61
LOS: 1 days | Discharge: HOME | End: 2021-08-24
Attending: EMERGENCY MEDICINE
Payer: MEDICARE

## 2021-08-24 VITALS
WEIGHT: 149.03 LBS | HEIGHT: 63 IN | OXYGEN SATURATION: 94 % | DIASTOLIC BLOOD PRESSURE: 87 MMHG | HEART RATE: 79 BPM | RESPIRATION RATE: 20 BRPM | TEMPERATURE: 99 F | SYSTOLIC BLOOD PRESSURE: 167 MMHG

## 2021-08-24 VITALS
SYSTOLIC BLOOD PRESSURE: 162 MMHG | TEMPERATURE: 99 F | DIASTOLIC BLOOD PRESSURE: 82 MMHG | OXYGEN SATURATION: 95 % | HEART RATE: 78 BPM | RESPIRATION RATE: 18 BRPM

## 2021-08-24 DIAGNOSIS — I77.0 ARTERIOVENOUS FISTULA, ACQUIRED: Chronic | ICD-10-CM

## 2021-08-24 DIAGNOSIS — Z49.01 ENCOUNTER FOR FITTING AND ADJUSTMENT OF EXTRACORPOREAL DIALYSIS CATHETER: Chronic | ICD-10-CM

## 2021-08-24 LAB
ALBUMIN SERPL ELPH-MCNC: 3.5 G/DL — SIGNIFICANT CHANGE UP (ref 3.3–5.2)
ALP SERPL-CCNC: 311 U/L — HIGH (ref 40–120)
ALT FLD-CCNC: 25 U/L — SIGNIFICANT CHANGE UP
ANION GAP SERPL CALC-SCNC: 12 MMOL/L — SIGNIFICANT CHANGE UP (ref 5–17)
ANISOCYTOSIS BLD QL: SLIGHT — SIGNIFICANT CHANGE UP
APTT BLD: 34.3 SEC — SIGNIFICANT CHANGE UP (ref 27.5–35.5)
AST SERPL-CCNC: 41 U/L — HIGH
BASOPHILS # BLD AUTO: 0 K/UL — SIGNIFICANT CHANGE UP (ref 0–0.2)
BASOPHILS NFR BLD AUTO: 0 % — SIGNIFICANT CHANGE UP (ref 0–2)
BILIRUB SERPL-MCNC: 0.5 MG/DL — SIGNIFICANT CHANGE UP (ref 0.4–2)
BUN SERPL-MCNC: 18.8 MG/DL — SIGNIFICANT CHANGE UP (ref 8–20)
CALCIUM SERPL-MCNC: 8.5 MG/DL — LOW (ref 8.6–10.2)
CHLORIDE SERPL-SCNC: 92 MMOL/L — LOW (ref 98–107)
CO2 SERPL-SCNC: 28 MMOL/L — SIGNIFICANT CHANGE UP (ref 22–29)
CREAT SERPL-MCNC: 4.32 MG/DL — HIGH (ref 0.5–1.3)
EOSINOPHIL # BLD AUTO: 0.19 K/UL — SIGNIFICANT CHANGE UP (ref 0–0.5)
EOSINOPHIL NFR BLD AUTO: 3.5 % — SIGNIFICANT CHANGE UP (ref 0–6)
GIANT PLATELETS BLD QL SMEAR: PRESENT — SIGNIFICANT CHANGE UP
GLUCOSE SERPL-MCNC: 344 MG/DL — HIGH (ref 70–99)
HCT VFR BLD CALC: 32.2 % — LOW (ref 34.5–45)
HGB BLD-MCNC: 10.2 G/DL — LOW (ref 11.5–15.5)
INR BLD: 1.13 RATIO — SIGNIFICANT CHANGE UP (ref 0.88–1.16)
LYMPHOCYTES # BLD AUTO: 0.61 K/UL — LOW (ref 1–3.3)
LYMPHOCYTES # BLD AUTO: 11.5 % — LOW (ref 13–44)
MACROCYTES BLD QL: SLIGHT — SIGNIFICANT CHANGE UP
MANUAL SMEAR VERIFICATION: SIGNIFICANT CHANGE UP
MCHC RBC-ENTMCNC: 29.5 PG — SIGNIFICANT CHANGE UP (ref 27–34)
MCHC RBC-ENTMCNC: 31.7 GM/DL — LOW (ref 32–36)
MCV RBC AUTO: 93.1 FL — SIGNIFICANT CHANGE UP (ref 80–100)
MONOCYTES # BLD AUTO: 0.43 K/UL — SIGNIFICANT CHANGE UP (ref 0–0.9)
MONOCYTES NFR BLD AUTO: 8 % — SIGNIFICANT CHANGE UP (ref 2–14)
NEUTROPHILS # BLD AUTO: 4.01 K/UL — SIGNIFICANT CHANGE UP (ref 1.8–7.4)
NEUTROPHILS NFR BLD AUTO: 75.2 % — SIGNIFICANT CHANGE UP (ref 43–77)
OVALOCYTES BLD QL SMEAR: SLIGHT — SIGNIFICANT CHANGE UP
PLAT MORPH BLD: NORMAL — SIGNIFICANT CHANGE UP
PLATELET # BLD AUTO: 256 K/UL — SIGNIFICANT CHANGE UP (ref 150–400)
POIKILOCYTOSIS BLD QL AUTO: SLIGHT — SIGNIFICANT CHANGE UP
POLYCHROMASIA BLD QL SMEAR: SLIGHT — SIGNIFICANT CHANGE UP
POTASSIUM SERPL-MCNC: 5.7 MMOL/L — HIGH (ref 3.5–5.3)
POTASSIUM SERPL-SCNC: 5.7 MMOL/L — HIGH (ref 3.5–5.3)
PROT SERPL-MCNC: 7.9 G/DL — SIGNIFICANT CHANGE UP (ref 6.6–8.7)
PROTHROM AB SERPL-ACNC: 13 SEC — SIGNIFICANT CHANGE UP (ref 10.6–13.6)
RBC # BLD: 3.46 M/UL — LOW (ref 3.8–5.2)
RBC # FLD: 15 % — HIGH (ref 10.3–14.5)
RBC BLD AUTO: ABNORMAL
SODIUM SERPL-SCNC: 132 MMOL/L — LOW (ref 135–145)
VARIANT LYMPHS # BLD: 1.8 % — SIGNIFICANT CHANGE UP (ref 0–6)
WBC # BLD: 5.33 K/UL — SIGNIFICANT CHANGE UP (ref 3.8–10.5)
WBC # FLD AUTO: 5.33 K/UL — SIGNIFICANT CHANGE UP (ref 3.8–10.5)

## 2021-08-24 PROCEDURE — 73564 X-RAY EXAM KNEE 4 OR MORE: CPT | Mod: 26,LT

## 2021-08-24 PROCEDURE — 93971 EXTREMITY STUDY: CPT | Mod: 26,LT

## 2021-08-24 PROCEDURE — 73564 X-RAY EXAM KNEE 4 OR MORE: CPT

## 2021-08-24 PROCEDURE — 99284 EMERGENCY DEPT VISIT MOD MDM: CPT

## 2021-08-24 PROCEDURE — 36415 COLL VENOUS BLD VENIPUNCTURE: CPT

## 2021-08-24 PROCEDURE — 80053 COMPREHEN METABOLIC PANEL: CPT

## 2021-08-24 PROCEDURE — 85730 THROMBOPLASTIN TIME PARTIAL: CPT

## 2021-08-24 PROCEDURE — 85025 COMPLETE CBC W/AUTO DIFF WBC: CPT

## 2021-08-24 PROCEDURE — 93971 EXTREMITY STUDY: CPT

## 2021-08-24 PROCEDURE — 99284 EMERGENCY DEPT VISIT MOD MDM: CPT | Mod: 25

## 2021-08-24 PROCEDURE — 85610 PROTHROMBIN TIME: CPT

## 2021-08-24 RX ORDER — LIDOCAINE 4 G/100G
1 CREAM TOPICAL ONCE
Refills: 0 | Status: COMPLETED | OUTPATIENT
Start: 2021-08-24 | End: 2021-08-24

## 2021-08-24 RX ORDER — ACETAMINOPHEN 500 MG
975 TABLET ORAL ONCE
Refills: 0 | Status: COMPLETED | OUTPATIENT
Start: 2021-08-24 | End: 2021-08-24

## 2021-08-24 RX ADMIN — Medication 975 MILLIGRAM(S): at 19:51

## 2021-08-24 RX ADMIN — LIDOCAINE 1 PATCH: 4 CREAM TOPICAL at 19:51

## 2021-08-24 NOTE — ED PROVIDER NOTE - NSFOLLOWUPINSTRUCTIONS_ED_ALL_ED_FT
Follow up with your primary care doctor in next 1-7 days.     Return to emergency room if pain worsens, fever/chills, nausea or vomiting, abdominal pain, unable to ambulate, worsening ambulation, increased swelling, increased symptoms

## 2021-08-24 NOTE — ED PROVIDER NOTE - PHYSICAL EXAMINATION
General: Well appearing female in no acute distress  HEENT: Normocephalic, atraumatic. Moist mucous membranes. Oropharynx clear. No lymphadenopathy.  Eyes: No scleral icterus. EOMI. RENE.  Neck:. Soft and supple. Full ROM without pain. No midline tenderness  Cardiac: Regular rate and regular rhythm. No murmurs, rubs, gallops. Peripheral pulses 2+ and symmetric. No LE edema.  Resp: Lungs CTAB. Speaking in full sentences. No wheezes, rales or rhonchi.  Abd: Soft, non-tender, non-distended. No guarding or rebound. No scars, masses, or lesions.  Back: Spine midline and non-tender. No CVA tenderness.    Skin: No rashes, abrasions, or lacerations.  Neuro: AO x 3. Moves all extremities symmetrically. Motor strength and sensation grossly intact.  MSK: +L knee tenderness, edematous relative to the R knee, DP 2+

## 2021-08-24 NOTE — ED ADULT NURSE NOTE - PRO INTERPRETER NEED 2
"  SUBJECTIVE:                                                    Leandra Chris is a 41 year old female who presents to clinic today for the following health issues:      Rectal Bleeding      Onset: A week ago Friday     Description:   Pain: no   Itching: no     Accompanying Signs & Symptoms:    Pt noticed blood that soaked through her pants with some pus discharge, certain it was rectal bleeding   Changes in stool pattern: no    History:   Any previous GI studies done:none  Family History of colon cancer: no     Precipitating factors:   None    Alleviating factors:  None     Therapies Tried and outcome: none     Has noticed slight blood on tissue with wiping after a BM    No abdominal pain, fevers, weight loss, rectal pain    Skin Check       Duration: pt noticed skin changes on her face for about a year. Right hip and left leg skin changes she noticed within the last month.     Description (location/character/radiation): skin changes on face, right hip, and left leg. She has dark spots coming in on her face, spot on right hip has gotten bigger, and mole on left leg has changed in appearance.           Problem list and histories reviewed & adjusted, as indicated.  Additional history: as documented        Reviewed and updated as needed this visit by clinical staff  Allergies  Meds       Reviewed and updated as needed this visit by Provider         ROS:  C: NEGATIVE for fever, chills, change in weight  INTEGUMENTARY/SKIN: see HPI  E/M: NEGATIVE for ear, mouth and throat problems  R: NEGATIVE for significant cough or SOB  CV: NEGATIVE for chest pain, palpitations or peripheral edema  GI: see HPI  MUSCULOSKELETAL: NEGATIVE for significant arthralgias or myalgia  NEURO: NEGATIVE for weakness, dizziness or paresthesias  HEME/ALLERGY/IMMUNE: NEGATIVE for bleeding problems    OBJECTIVE:                                                    /76  Pulse 77  Temp 98.5  F (36.9  C) (Tympanic)  Resp 18  Ht 5' 6\" (1.676 " m)  Wt 261 lb (118.4 kg)  SpO2 99%  BMI 42.13 kg/m2  Body mass index is 42.13 kg/(m^2).  GENERAL: healthy, alert and no distress  RESP: lungs clear to auscultation - no rales, rhonchi or wheezes  CV: regular rate and rhythm, normal S1 S2, no S3 or S4, no murmur, click or rub, no peripheral edema and peripheral pulses strong  ABDOMEN: soft, nontender, no hepatosplenomegaly, no masses and bowel sounds normal  RECTAL (female): normal sphincter tone, no rectal masses; small tags noted  MS: no gross musculoskeletal defects noted, no edema  SKIN: benign-appearing nevi on legs; pigmented lesion on left cheek, slightly atypical for a solar lentigo         ASSESSMENT/PLAN:                                                            1. Rectal bleeding  Will refer to colorectal for evaluation.   - COLORECTAL SURGERY REFERRAL  - CBC with platelets    2. Facial lesion  Likely benign, but recommended derm evaluation.   - DERMATOLOGY REFERRAL    3. Vitamin D deficiency  Currently just taking a MVI.   - Vitamin D Deficiency    4.  Morbid Obesity  Plan: Discussed diet and exercise.         Nelli Nye NP  [facility]     [place]

## 2021-08-24 NOTE — ED PROVIDER NOTE - ATTENDING CONTRIBUTION TO CARE
AJM: pt with knee pain s/p fall several weeks ago. FROM , no warmth, redness, ttp, effusion. Able to ambulate. will obtain xray, sono, pain meds, reassess

## 2021-08-24 NOTE — ED PROVIDER NOTE - OBJECTIVE STATEMENT
60 y/o F hx of GI bleed, AF, dialysis (M,W,F), ascites requiring paracentesis presents with left lower leg pain. States that she fell 6 months ago but has been ambulating w/ walker. Endorses left lower pain for the past 3 days from the thigh down to the ankle. States that the left leg is more swollen relative to the right. Patient states she did go and complete her dialysis yesterday. Denies new trauma since the fall. Denies fever/chills. Denies chest pain/sob. Denies abdominal pain. Denies nausea or vomiting.

## 2021-08-24 NOTE — ED PROVIDER NOTE - CLINICAL SUMMARY MEDICAL DECISION MAKING FREE TEXT BOX
60 y/o F hx of GI bleed, AF, dialysis (M,W,F), ascites requiring paracentesis presents with left lower leg pain.   edematous relative to the R knee, tender, has been ambulating since fall 6 months ago. Does not appear to be cellulitic, not erythematous. likely effusion of L knee.   xray L knee - r/o fracture , less likely, full ROM   cbc, cmp, coags   duplex left lower extremity

## 2021-08-24 NOTE — ED PROVIDER NOTE - PATIENT PORTAL LINK FT
You can access the FollowMyHealth Patient Portal offered by [facility] by registering at the following website: [email or web address]. By joining [facility]’s FollowMyHealth portal, you will also be able to view your health information using other applications (apps) compatible with our system.

## 2021-08-24 NOTE — ED ADULT NURSE NOTE - NSIMPLEMENTINTERV_GEN_ALL_ED
Implemented All Fall with Harm Risk Interventions:  [place] to call system. Call bell, personal items and telephone within reach. Instruct patient to call for assistance. Room bathroom lighting operational. Non-slip footwear when patient is off stretcher. Physically safe environment: no spills, clutter or unnecessary equipment. Stretcher in lowest position, wheels locked, appropriate side rails in place. Provide visual cue, wrist band, yellow gown, etc. Monitor gait and stability. Monitor for mental status changes and reorient to person, place, and time. Review medications for side effects contributing to fall risk. Reinforce activity limits and safety measures with patient and family. Provide visual clues: red socks.

## 2021-08-30 LAB
BASOPHILS # BLD AUTO: 0.03 K/UL
BASOPHILS NFR BLD AUTO: 0.7 %
EOSINOPHIL # BLD AUTO: 0.18 K/UL
EOSINOPHIL NFR BLD AUTO: 4.2 %
HCT VFR BLD CALC: 33.1 %
HGB BLD-MCNC: 10 G/DL
IMM GRANULOCYTES NFR BLD AUTO: 0.2 %
INR PPP: 1 RATIO
LYMPHOCYTES # BLD AUTO: 0.59 K/UL
LYMPHOCYTES NFR BLD AUTO: 13.9 %
MAN DIFF?: NORMAL
MCHC RBC-ENTMCNC: 29.6 PG
MCHC RBC-ENTMCNC: 30.2 GM/DL
MCV RBC AUTO: 97.9 FL
MONOCYTES # BLD AUTO: 0.39 K/UL
MONOCYTES NFR BLD AUTO: 9.2 %
NEUTROPHILS # BLD AUTO: 3.05 K/UL
NEUTROPHILS NFR BLD AUTO: 71.8 %
PLATELET # BLD AUTO: 264 K/UL
PT BLD: 11.9 SEC
RBC # BLD: 3.38 M/UL
RBC # FLD: 15.1 %
SARS-COV-2 N GENE NPH QL NAA+PROBE: NOT DETECTED
WBC # FLD AUTO: 4.25 K/UL

## 2021-08-31 ENCOUNTER — OUTPATIENT (OUTPATIENT)
Dept: OUTPATIENT SERVICES | Facility: HOSPITAL | Age: 61
LOS: 1 days | End: 2021-08-31

## 2021-08-31 ENCOUNTER — APPOINTMENT (OUTPATIENT)
Dept: INTERVENTIONAL RADIOLOGY/VASCULAR | Facility: CLINIC | Age: 61
End: 2021-08-31
Payer: MEDICARE

## 2021-08-31 ENCOUNTER — EMERGENCY (EMERGENCY)
Facility: HOSPITAL | Age: 61
LOS: 1 days | Discharge: HOME | End: 2021-08-31
Attending: EMERGENCY MEDICINE
Payer: MEDICARE

## 2021-08-31 VITALS
TEMPERATURE: 98 F | SYSTOLIC BLOOD PRESSURE: 165 MMHG | RESPIRATION RATE: 19 BRPM | HEART RATE: 76 BPM | OXYGEN SATURATION: 98 % | DIASTOLIC BLOOD PRESSURE: 81 MMHG

## 2021-08-31 VITALS
RESPIRATION RATE: 18 BRPM | HEIGHT: 63 IN | OXYGEN SATURATION: 95 % | WEIGHT: 141.32 LBS | DIASTOLIC BLOOD PRESSURE: 73 MMHG | TEMPERATURE: 99 F | SYSTOLIC BLOOD PRESSURE: 167 MMHG | HEART RATE: 78 BPM

## 2021-08-31 VITALS
RESPIRATION RATE: 18 BRPM | TEMPERATURE: 98.2 F | SYSTOLIC BLOOD PRESSURE: 149 MMHG | OXYGEN SATURATION: 100 % | DIASTOLIC BLOOD PRESSURE: 71 MMHG | HEART RATE: 74 BPM

## 2021-08-31 DIAGNOSIS — Z49.01 ENCOUNTER FOR FITTING AND ADJUSTMENT OF EXTRACORPOREAL DIALYSIS CATHETER: Chronic | ICD-10-CM

## 2021-08-31 DIAGNOSIS — I77.0 ARTERIOVENOUS FISTULA, ACQUIRED: Chronic | ICD-10-CM

## 2021-08-31 DIAGNOSIS — Z00.8 ENCOUNTER FOR OTHER GENERAL EXAMINATION: ICD-10-CM

## 2021-08-31 LAB
ALBUMIN SERPL ELPH-MCNC: 3.4 G/DL — SIGNIFICANT CHANGE UP (ref 3.3–5.2)
ALP SERPL-CCNC: 275 U/L — HIGH (ref 40–120)
ALT FLD-CCNC: 22 U/L — SIGNIFICANT CHANGE UP
ANION GAP SERPL CALC-SCNC: 14 MMOL/L — SIGNIFICANT CHANGE UP (ref 5–17)
AST SERPL-CCNC: 29 U/L — SIGNIFICANT CHANGE UP
BASOPHILS # BLD AUTO: 0 K/UL — SIGNIFICANT CHANGE UP (ref 0–0.2)
BASOPHILS NFR BLD AUTO: 0 % — SIGNIFICANT CHANGE UP (ref 0–2)
BILIRUB SERPL-MCNC: 0.5 MG/DL — SIGNIFICANT CHANGE UP (ref 0.4–2)
BUN SERPL-MCNC: 24.4 MG/DL — HIGH (ref 8–20)
CALCIUM SERPL-MCNC: 8.7 MG/DL — SIGNIFICANT CHANGE UP (ref 8.6–10.2)
CHLORIDE SERPL-SCNC: 96 MMOL/L — LOW (ref 98–107)
CO2 SERPL-SCNC: 29 MMOL/L — SIGNIFICANT CHANGE UP (ref 22–29)
CREAT SERPL-MCNC: 3.84 MG/DL — HIGH (ref 0.5–1.3)
ELLIPTOCYTES BLD QL SMEAR: SLIGHT — SIGNIFICANT CHANGE UP
EOSINOPHIL # BLD AUTO: 0.05 K/UL — SIGNIFICANT CHANGE UP (ref 0–0.5)
EOSINOPHIL NFR BLD AUTO: 0.9 % — SIGNIFICANT CHANGE UP (ref 0–6)
GIANT PLATELETS BLD QL SMEAR: PRESENT — SIGNIFICANT CHANGE UP
GLUCOSE SERPL-MCNC: 199 MG/DL — HIGH (ref 70–99)
HCT VFR BLD CALC: 33 % — LOW (ref 34.5–45)
HGB BLD-MCNC: 10.4 G/DL — LOW (ref 11.5–15.5)
HYPOCHROMIA BLD QL: SLIGHT — SIGNIFICANT CHANGE UP
LYMPHOCYTES # BLD AUTO: 0.51 K/UL — LOW (ref 1–3.3)
LYMPHOCYTES # BLD AUTO: 9 % — LOW (ref 13–44)
MANUAL SMEAR VERIFICATION: SIGNIFICANT CHANGE UP
MCHC RBC-ENTMCNC: 29.5 PG — SIGNIFICANT CHANGE UP (ref 27–34)
MCHC RBC-ENTMCNC: 31.5 GM/DL — LOW (ref 32–36)
MCV RBC AUTO: 93.5 FL — SIGNIFICANT CHANGE UP (ref 80–100)
MONOCYTES # BLD AUTO: 0.31 K/UL — SIGNIFICANT CHANGE UP (ref 0–0.9)
MONOCYTES NFR BLD AUTO: 5.4 % — SIGNIFICANT CHANGE UP (ref 2–14)
NEUTROPHILS # BLD AUTO: 4.84 K/UL — SIGNIFICANT CHANGE UP (ref 1.8–7.4)
NEUTROPHILS NFR BLD AUTO: 84.7 % — HIGH (ref 43–77)
NT-PROBNP SERPL-SCNC: HIGH PG/ML (ref 0–300)
OVALOCYTES BLD QL SMEAR: SLIGHT — SIGNIFICANT CHANGE UP
PLAT MORPH BLD: NORMAL — SIGNIFICANT CHANGE UP
PLATELET # BLD AUTO: 237 K/UL — SIGNIFICANT CHANGE UP (ref 150–400)
POIKILOCYTOSIS BLD QL AUTO: SLIGHT — SIGNIFICANT CHANGE UP
POLYCHROMASIA BLD QL SMEAR: SLIGHT — SIGNIFICANT CHANGE UP
POTASSIUM SERPL-MCNC: 4.3 MMOL/L — SIGNIFICANT CHANGE UP (ref 3.5–5.3)
POTASSIUM SERPL-SCNC: 4.3 MMOL/L — SIGNIFICANT CHANGE UP (ref 3.5–5.3)
PROT SERPL-MCNC: 7.5 G/DL — SIGNIFICANT CHANGE UP (ref 6.6–8.7)
RBC # BLD: 3.53 M/UL — LOW (ref 3.8–5.2)
RBC # FLD: 15.1 % — HIGH (ref 10.3–14.5)
RBC BLD AUTO: ABNORMAL
SCHISTOCYTES BLD QL AUTO: SLIGHT — SIGNIFICANT CHANGE UP
SMUDGE CELLS # BLD: PRESENT — SIGNIFICANT CHANGE UP
SODIUM SERPL-SCNC: 139 MMOL/L — SIGNIFICANT CHANGE UP (ref 135–145)
WBC # BLD: 5.71 K/UL — SIGNIFICANT CHANGE UP (ref 3.8–10.5)
WBC # FLD AUTO: 5.71 K/UL — SIGNIFICANT CHANGE UP (ref 3.8–10.5)

## 2021-08-31 PROCEDURE — T1013: CPT

## 2021-08-31 PROCEDURE — 80053 COMPREHEN METABOLIC PANEL: CPT

## 2021-08-31 PROCEDURE — 85025 COMPLETE CBC W/AUTO DIFF WBC: CPT

## 2021-08-31 PROCEDURE — 93005 ELECTROCARDIOGRAM TRACING: CPT

## 2021-08-31 PROCEDURE — 93923 UPR/LXTR ART STDY 3+ LVLS: CPT

## 2021-08-31 PROCEDURE — 99284 EMERGENCY DEPT VISIT MOD MDM: CPT | Mod: 25

## 2021-08-31 PROCEDURE — 93010 ELECTROCARDIOGRAM REPORT: CPT

## 2021-08-31 PROCEDURE — 93970 EXTREMITY STUDY: CPT | Mod: 26

## 2021-08-31 PROCEDURE — 93926 LOWER EXTREMITY STUDY: CPT

## 2021-08-31 PROCEDURE — 93970 EXTREMITY STUDY: CPT

## 2021-08-31 PROCEDURE — 83880 ASSAY OF NATRIURETIC PEPTIDE: CPT

## 2021-08-31 PROCEDURE — 99284 EMERGENCY DEPT VISIT MOD MDM: CPT

## 2021-08-31 PROCEDURE — 93926 LOWER EXTREMITY STUDY: CPT | Mod: 26,LT

## 2021-08-31 PROCEDURE — 93923 UPR/LXTR ART STDY 3+ LVLS: CPT | Mod: 26

## 2021-08-31 PROCEDURE — 49083 ABD PARACENTESIS W/IMAGING: CPT

## 2021-08-31 PROCEDURE — 36415 COLL VENOUS BLD VENIPUNCTURE: CPT

## 2021-08-31 RX ORDER — METHOCARBAMOL 500 MG/1
1500 TABLET, FILM COATED ORAL ONCE
Refills: 0 | Status: COMPLETED | OUTPATIENT
Start: 2021-08-31 | End: 2021-08-31

## 2021-08-31 RX ORDER — ACETAMINOPHEN 500 MG
975 TABLET ORAL ONCE
Refills: 0 | Status: COMPLETED | OUTPATIENT
Start: 2021-08-31 | End: 2021-08-31

## 2021-08-31 RX ADMIN — Medication 975 MILLIGRAM(S): at 15:15

## 2021-08-31 RX ADMIN — METHOCARBAMOL 1500 MILLIGRAM(S): 500 TABLET, FILM COATED ORAL at 15:16

## 2021-08-31 NOTE — ED PROVIDER NOTE - ATTENDING CONTRIBUTION TO CARE
Dr. Youngblood : I have personally seen and examined this patient at the bedside. I have fully participated in the care of this patient. I have reviewed all pertinent clinical information, including history, physical exam, plan and the Resident's note and agree except as noted.     62yo F w/ sPMH liver failure, T2DM, renal failure on HD M/W/F pw leg swelling. She states her legs have been swollen for more than a week L>R. also notes pain from left buttocks to thigh and calf. worse with walking.  She had large volume ascites removed by IR via paracentesis earlier today. last dialyzed yesterday. no trauma. no feal or urinary incontinence.,     Denies f/c/n/v/cp/sob/palpitations/cough/abd.pain/d/c/dysuria/hematuria. no sick contacts/recent travel.    PE:  head; atraumatic normocephalic  eyes: perrla  Heart: rrr s1s2  lungs: ctab  abd: soft, nt nd + bs no rebound/guarding no cva ttp  le: bl le swelling L>r TTP to left buttock no back pain neurovasculalry intact 2+ dorsalis pedis pulse no erythema no warmth  back: no midline cervical/thoracic/lumbar ttp      -->le swelling hx possibly multifactorial dialysis pt and liver cirrhosis will fu le sono to ro dvt/ arterial stenosis as also notes pain with walking ; possibly sciatica ttp to buttocks radiaitng down

## 2021-08-31 NOTE — ED ADULT NURSE NOTE - OBJECTIVE STATEMENT
pt with home attendant present at bedside assisting with interpretation, pt with swelling of left lower extremity, noted on Aug 24th, pt was complaining of pain to that leg couple days prior, swelling increased. HD mon/wed/fri, fistula to MELISSA.

## 2021-08-31 NOTE — ED ADULT TRIAGE NOTE - HEIGHT IN INCHES
Acute Care - Physical Therapy Initial Evaluation  [facility]     Patient Name: Nallely Junior  : 1939  MRN: [id number]  Today's Date: 10/8/2018   Onset of Illness/Injury or Date of Surgery: 10/06/18  Date of Referral to PT: 10/06/18  Referring Physician: TIN Murphy MD      Admit Date: 10/6/2018    Visit Dx:     ICD-10-CM ICD-9-CM   1. Impaired mobility and ADLs Z74.09 799.89   2. Dysphagia, unspecified type R13.10 787.20   3. Impaired functional mobility, balance, gait, and endurance Z74.09 V49.89     Patient Active Problem List   Diagnosis   • Pneumonia   • Type 2 diabetes mellitus (CMS/HCC)   • Hypotension   • Hyperlipidemia   • Depression   • Anxiety   • COPD (chronic obstructive pulmonary disease) (CMS/HCC)   • Dysphagia   • Coronary artery disease   • CHF (congestive heart failure) (CMS/HCC)   • GERD (gastroesophageal reflux disease)   • Chronic respiratory failure with hypoxia and hypercapnia (CMS/HCC)     Past Medical History:   Diagnosis Date   • Anemia    • Anxiety    • Aortic stenosis    • CHF (congestive heart failure) (CMS/HCC)    • Chronic respiratory failure with hypoxia and hypercapnia (CMS/HCC)    • CKD (chronic kidney disease), stage III (CMS/HCC)    • Constipation    • COPD (chronic obstructive pulmonary disease) (CMS/HCC)    • Coronary artery disease    • Dementia    • Depression    • Diabetes mellitus (CMS/HCC)    • Dysphagia    • Femur fracture, right (CMS/HCC)    • GERD (gastroesophageal reflux disease)    • Hyperlipidemia    • Hypertension    • Hypotension     on midodrine   • Restless legs syndrome (RLS)      Past Surgical History:   Procedure Laterality Date   • FOREARM SURGERY     • LEG SURGERY     • PEG TUBE INSERTION     • TRACHEOSTOMY          PT ASSESSMENT (last 12 hours)      Physical Therapy Evaluation     Row Name 10/08/18 0943          PT Evaluation Time/Intention    Subjective Information no complaints  -MB     Document Type evaluation  -MB     Mode of Treatment physical  therapy  -MB     Patient Effort good  -MB     Symptoms Noted During/After Treatment fatigue;shortness of breath  -MB     Row Name 10/08/18 0943          General Information    Patient Profile Reviewed? yes  -MB     Onset of Illness/Injury or Date of Surgery 10/06/18  -MB     Referring Physician TIN Murphy MD  -MB     Patient Observations alert;cooperative;agree to therapy  -MB     Patient/Family Observations No family present at bedside.  -MB     General Observations of Patient Pt. supine, trach collar, PEG, coronado, IV intact, telemetry.  RN consent for PT.  -MB     Prior Level of Function max assist:;gait;transfer;bed mobility;ADL's   Pt. reports she has not performed transfers x 2 years.  -MB     Equipment Currently Used at Home walker, rolling;wheelchair  -MB     Pertinent History of Current Functional Problem Pt. is a 80 yo female directly admitted from [facility] w/ nonresolving PNA, mucous plugging, trach issues.  -MB     Existing Precautions/Restrictions fall;oxygen therapy device and L/min   trach  -MB     Risks Reviewed patient:;LOB;nausea/vomiting;dizziness;increased discomfort;change in vital signs;increased drainage;lines disloged  -MB     Benefits Reviewed patient:;improve function;increase independence;increase strength;increase balance;decrease pain;decrease risk of DVT;improve skin integrity;increase knowledge  -MB     Barriers to Rehab medically complex;previous functional deficit  -MB     Row Name 10/08/18 0943          Relationship/Environment    Lives With facility resident   [facility]  -MB     Row Name 10/08/18 0943          Resource/Environmental Concerns    Current Living Arrangements extended care facility  -MB     Row Name 10/08/18 0943          Cognitive Assessment/Intervention- PT/OT    Orientation Status (Cognition) oriented x 4  -MB     Follows Commands (Cognition) follows one step commands;over 90% accuracy;verbal cues/prompting required  -MB     Safety Deficit  (Cognitive) mild deficit;awareness of need for assistance;insight into deficits/self awareness;safety precautions awareness  -MB     Row Name 10/08/18 0943          Safety Issues, Functional Mobility    Impairments Affecting Function (Mobility) balance;coordination;endurance/activity tolerance;range of motion (ROM);shortness of breath;strength  -MB     Row Name 10/08/18 0943          Bed Mobility Assessment/Treatment    Supine-Sit [place] (Bed Mobility) maximum assist (25% patient effort);verbal cues  -MB     Sit-Supine [place] (Bed Mobility) maximum assist (25% patient effort);verbal cues  -MB     Bed Mobility, Safety Issues decreased use of arms for pushing/pulling;decreased use of legs for bridging/pushing  -MB     Assistive Device (Bed Mobility) draw sheet;head of bed elevated  -MB     Comment (Bed Mobility) Pt. required assist to elevate and lower trunk/shoulders to upright sitting EOB.    -[facility] Name 10/08/18 0943          Transfer Assessment/Treatment    Comment (Transfers) Deferred  -[facility] Name 10/08/18 0943          Gait/Stairs Assessment/Training    Comment (Gait/Stairs) Deferred.  Pt. non-ambulatory x 2 years, per patient.  -MB     Row Name 10/08/18 0943          General ROM    RT Lower Ext --  -MB     LT Lower Ext Lt Ankle Dorsiflexion  -MB     GENERAL ROM COMMENTS L ankle DF lacking 20 degrees from neutral, R ankle DF to neutral; otherwise, BLE ROM WFL.  -[facility] Name 10/08/18 0943          MMT (Manual Muscle Testing)    General MMT Comments BLEs grossly 4-/5, B ankles 3-/5.  -MB     Row Name 10/08/18 0943          Motor Assessment/Intervention    Additional Documentation Therapeutic Exercise (Group)  -MB     Row Name 10/08/18 0943          Therapeutic Exercise    Therapeutic Exercise supine, lower extremities  -MB     Additional Documentation Therapeutic Exercise (Row)  -MB     Row Name 10/08/18 0943          Lower Extremity Supine Therapeutic Exercise    Performed, Supine Lower  Extremity (Therapeutic Exercise) hip flexion/extension;hip abduction/adduction;ankle dorsiflexion/plantarflexion;SAQ (short arc quad) over bolster;quadriceps sets;gluteal sets;ankle pumps;dorsiflexor stretch  -MB     Exercise Type, Supine Lower Extremity (Therapeutic Exercise) AAROM (active assistive range of motion);AROM (active range of motion);static stretching  -MB     Sets/Reps Detail, Supine Lower Extremity (Therapeutic Exercise) 1 set/10 reps, BLEs  -MB     Row Name 10/08/18 0943          Sensory Assessment/Intervention    Sensory General Assessment no sensation deficits identified   B LEs intact to LT.  -MB     Row Name 10/08/18 0943          Pain Scale: Numbers Pre/Post-Treatment    Pain Scale: Numbers, Pretreatment 0/10 - no pain  -MB     Pain Scale: Numbers, Post-Treatment 0/10 - no pain  -MB     Row Name 10/08/18 0943          Plan of Care Review    Plan of Care Reviewed With patient  -MB     Row Name 10/08/18 0943          Physical Therapy Clinical Impression    Date of Referral to PT 10/06/18  -MB     PT Diagnosis (PT Clinical Impression) Functional Decline  -MB     Functional Level at Time of Evaluation (PT Clinical Impression) Pt. requires max A for bed mobility; non-ambulatory at baseline.  -MB     Patient/Family Goals Statement (PT Clinical Impression) Pt. did not state.  -MB     Criteria for Skilled Interventions Met (PT Clinical Impression) yes;treatment indicated  -MB     Rehab Potential (PT Clinical Summary) fair, will monitor progress closely  -MB     Care Plan Review (PT) evaluation/treatment results reviewed;care plan/treatment goals reviewed;risks/benefits reviewed;current/potential barriers reviewed;patient/other agree to care plan  -MB     Row Name 10/08/18 0943          Vital Signs    Pre Systolic BP Rehab 115  -MB     Pre Treatment Diastolic BP 65  -MB     Pre SpO2 (%) 93  -MB     O2 Delivery Pre Treatment trach collar  -MB     Post SpO2 (%) 92  -MB     O2 Delivery Post Treatment  supplemental O2  -MB     Pre Patient Position Supine  -MB     Intra Patient Position Sitting  -MB     Post Patient Position Supine  -MB     Row Name 10/08/18 0943          Physical Therapy Goals    Bed Mobility Goal Selection (PT) bed mobility, PT goal 1  -MB     Transfer Goal Selection (PT) transfer, PT goal 1  -MB     Additional Documentation Balance Goal Selection (PT) (Row)  -MB     Row Name 10/08/18 0943          Bed Mobility Goal 1 (PT)    Activity/Assistive Device (Bed Mobility Goal 1, PT) bed mobility activities, all  -MB     [place] Level/Cues Needed (Bed Mobility Goal 1, PT) moderate assist (50-74% patient effort)  -MB     Time Frame (Bed Mobility Goal 1, PT) 2 weeks  -MB     Progress/Outcomes (Bed Mobility Goal 1, PT) goal ongoing  -MB     Row Name 10/08/18 0943          Transfer Goal 1 (PT)    Activity/Assistive Device (Transfer Goal 1, PT) sit-to-stand/stand-to-sit;walker, rolling  -MB     [place] Level/Cues Needed (Transfer Goal 1, PT) maximum assist (25-49% patient effort)  -MB     Time Frame (Transfer Goal 1, PT) 2 weeks  -MB     Barriers (Transfers Goal 1, PT) L ankle contracture  -MB     Progress/Outcome (Transfer Goal 1, PT) goal ongoing  -MB     Row Name 10/08/18 0943          Patient Education Goal (PT)    Activity (Patient Education Goal, PT) HEP for strengthening  -MB     [place]/Cues/Accuracy (Memory Goal 2, PT) demonstrates adequately;independent;verbalizes understanding  -MB     Time Frame (Patient Education Goal, PT) 1 week  -MB     Progress/Outcome (Patient Education Goal, PT) goal ongoing  -MB     Row Name 10/08/18 0943          Positioning and Restraints    Pre-Treatment Position in bed  -MB     Post Treatment Position bed  -MB     In Bed notified nsg;supine;call light within reach;encouraged to call for assist;exit alarm on;RUE elevated;LUE elevated;heels elevated  -MB     Row Name 10/08/18 0943          Living Environment    Home Accessibility wheelchair accessible  -MB        User Key  (r) = Recorded By, (t) = Taken By, (c) = Cosigned By    Initials Name Provider Type    Britt Clay, PT Physical Therapist              PT Recommendation and Plan  Anticipated Discharge Disposition (PT): extended care facility  Planned Therapy Interventions (PT Eval): balance training, bed mobility training, home exercise program, patient/family education, strengthening, transfer training, manual therapy techniques, ROM (range of motion)  Therapy Frequency (PT Clinical Impression): daily  Outcome Summary/Treatment Plan (PT)  Anticipated Equipment Needs at Discharge (PT):  (TBD)  Anticipated Discharge Disposition (PT): extended care facility  Plan of Care Reviewed With: patient  Progress: improving  Outcome Summary: PT eval complerted.  Pt. pleasant, w/ good effort.  Pt. presents w/ generalized weakness, decreased ROM w/ L ankle contracture, decreased functional endurance, and impaired mobility.  Pt. performed bed mobility w/ max A, participated in BLE supine Ther Ex, and tolerated PROM/stretching L ankle.  Pt. would benefit from B multipodus boots to protect ankle ROM and facilitate improved L ankle DF for improved ability to perform safe transfers.  Pt. will benefit from skilled IPPT to address impairments and maximize patients safety and independence w/ mobility.            Outcome Measures     Row Name 10/08/18 0943 10/07/18 0855          How much help from another person do you currently need...    Turning from your back to your side while in flat bed without using bedrails? 2  -MB  --     Moving from lying on back to sitting on the side of a flat bed without bedrails? 2  -MB  --     Moving to and from a bed to a chair (including a wheelchair)? 1  -MB  --     Standing up from a chair using your arms (e.g., wheelchair, bedside chair)? 1  -MB  --     Climbing 3-5 steps with a railing? 1  -MB  --     To walk in hospital room? 1  -MB  --     AM-PAC 6 Clicks Score 8  -MB  --        How much  help from another is currently needed...    Putting on and taking off regular lower body clothing?  -- 1  -TA     Bathing (including washing, rinsing, and drying)  -- 1  -TA     Toileting (which includes using toilet bed pan or urinal)  -- 1  -TA     Putting on and taking off regular upper body clothing  -- 2  -TA     Taking care of personal grooming (such as brushing teeth)  -- 3  -TA     Eating meals  -- 3  -TA     Score  -- 11  -TA        Functional Assessment    Outcome Measure Options AM-PAC 6 Clicks Basic Mobility (PT)  -MB AM-PAC 6 Clicks Daily Activity (OT)  -TA       User Key  (r) = Recorded By, (t) = Taken By, (c) = Cosigned By    Initials Name Provider Type    TA Christiano Marie, OT Occupational Therapist    Britt Clay, PT Physical Therapist           Time Calculation:         PT Charges     Row Name 10/08/18 0943             Time Calculation    Start Time 0943  -MB      PT Received On 10/08/18  -MB      PT Goal Re-Cert Due Date 10/18/18  -MB        User Key  (r) = Recorded By, (t) = Taken By, (c) = Cosigned By    Initials Name Provider Type    Britt Clay, PT Physical Therapist        Therapy Suggested Charges     Code   Minutes Charges    None           Therapy Charges for Today     Code Description Service Date Service Provider Modifiers Qty    17011056305 HC PT MOBILITY CURRENT 10/8/2018 Britt Gilbert, PT GP, CM 1    99669704432 HC PT MOBILITY PROJECTED 10/8/2018 Britt Gilbert, PT GP, CL 1    [id number]  PT EVAL MOD COMPLEXITY 4 10/8/2018 Britt Gilbert, PT GP 1          PT G-Codes  Outcome Measure Options: AM-PAC 6 Clicks Basic Mobility (PT)  AM-PAC 6 Clicks Score: 8  Score: 11  Functional Limitation: Mobility: Walking and moving around  Mobility: Walking and Moving Around Current Status (): At least 80 percent but less than 100 percent impaired, limited or restricted  Mobility: Walking and Moving Around Goal Status (): At least 60 percent but  less than 80 percent impaired, limited or restricted      Britt Gilbert, PT  10/8/2018        3

## 2021-08-31 NOTE — ED PROVIDER NOTE - NS ED ROS FT
Review of Systems  CONSTITUTIONAL: afebrile w/no diaphoresis or weight changes  SKIN: DARKENING OF SKIN ON BL LLs; otherwise warm, dry w/ no rash or bleeding  EXT: SEE HPI  EYES: no changes to vision  ENT: no changes in hearing, no sore throat  RESPIRATORY: no cough or SOB  CARDIAC: no chest pain & no palpitations  GI: no abd pain, nausea, vomiting, diarrhea, constipation, or blood in stool/christian blood  PSYCH: no anxiety, non suicidal, non homicidal, without hallucinations or depression

## 2021-08-31 NOTE — ED PROVIDER NOTE - CLINICAL SUMMARY MEDICAL DECISION MAKING FREE TEXT BOX
62yo F w/ sPMH T2DM w/ kidney failure, liver failure now c/o LL edema and thigh pain w/ edemetous BL lower limbs on PE left>right and associated overlying skin changes. Suspicious for decreased oncotic pressure 2/2 liver failure, increased hydrostatic pressure 2/2 heart failure, DVT but less likely given duration of complaint and negative doppler from 08/24. Plan for CBC, CMP, BNP, EKG.

## 2021-08-31 NOTE — ED PROVIDER NOTE - OBJECTIVE STATEMENT
LENKA Elizabeth is a 62yo F w/ sPMH liver failure, T2DM, renal failure on HD M/W/F who was wheeled into the ED c/o of leg swelling. She states her legs have been swollen for more than a week. She says the swelling is a/w leg pain in left posterior thigh radiating up to her left hip. The pain is sharp. She had large volume ascites removed by IR via paracentesis earlier today. On PE she has LL pitting edema to the knees w/ over-lying skin changes. Pedal pulses are intact BL. 62yo F w/ sPMH liver failure, T2DM, renal failure on HD M/W/F who was wheeled into the ED c/o of leg swelling. She states her legs have been swollen for more than a week. She says the swelling is a/w leg pain in left posterior thigh radiating up to her left hip. The pain is sharp. She had large volume ascites removed by IR via paracentesis earlier today. On PE she has LL pitting edema to the knees w/ over-lying skin changes. Pedal pulses are intact BL.

## 2021-08-31 NOTE — ED STATDOCS - PROGRESS NOTE DETAILS
Gale Plunkett for ED attending, Dr. Corcoran: 62 y/o female with PMHx of CKD on dialysis, DM c/o swelling to left leg and pain for the past 3 days. Pt had recent ascites drainage. Pt will be placed in the main ED for complete evaluation by another provider.

## 2021-08-31 NOTE — ED PROVIDER NOTE - PATIENT PORTAL LINK FT
You can access the FollowMyHealth Patient Portal offered by [facility] by registering at the following website: [email or web address]. By joining [employer]’s FollowMyHealth portal, you will also be able to view your health information using other applications (apps) compatible with our system.

## 2021-08-31 NOTE — ED ADULT TRIAGE NOTE - CHIEF COMPLAINT QUOTE
pt c/o left leg pain and swelling x couple days, pt dialysis pt M/W/Fr last went Monday, pt denied fever/chills, sob, chest pain, abd pain

## 2021-08-31 NOTE — REASON FOR VISIT
[Procedure: _________] : a [unfilled] procedure visit [FreeTextEntry1] : Diagnosis: Ascites, Last done 8/17/2021 - 4000mL

## 2021-08-31 NOTE — ED ADULT NURSE NOTE - NSIMPLEMENTINTERV_GEN_ALL_ED
Implemented All Fall Risk Interventions:  [place] to call system. Call bell, personal items and telephone within reach. Instruct patient to call for assistance. Room bathroom lighting operational. Non-slip footwear when patient is off stretcher. Physically safe environment: no spills, clutter or unnecessary equipment. Stretcher in lowest position, wheels locked, appropriate side rails in place. Provide visual cue, wrist band, yellow gown, etc. Monitor gait and stability. Monitor for mental status changes and reorient to person, place, and time. Review medications for side effects contributing to fall risk. Reinforce activity limits and safety measures with patient and family.

## 2021-08-31 NOTE — ASSESSMENT
[FreeTextEntry1] : Histroy and consent was obtained with a  (pacific translation) Patient arrived c/o left leg pain / warm hot / swelling. Per aid Jaki patient was evaluated in ER 8/24/2021 with negative work up + d/c'd. But since then has increased in pain and swelling and has also is painful to entire leg vs just the knee 1 week ago. Patient agreeable to go straight to the ER after paracentesis. Patient + aid Jaki both expressed verbal understanding to go straight to the ER. Patient stable and left in no distress. \par \par Paracentesis done: Total volume: 4250mL\par \par FORMAL REPORT TO FOLLOW

## 2021-08-31 NOTE — ED PROVIDER NOTE - PHYSICAL EXAMINATION
VITAL SIGNS: I have reviewed nursing notes and confirm.  CONSTITUTIONAL:  in no acute distress.  SKIN: SEE HPI  EXT: SEE HPI  HEAD: Normocephalic, atraumatic.  EYES: conjunctiva and sclera clear.  ENT: pink & moist mucosa, no pharyngeal erythema, no lymphadenopathy  NECK: Supple, non tender.    CARD: Regular rate and rhythm.  RESP: No wheezes, rales or rhonchi.   ABD:  soft, non-distended, non-tender;   NEURO: Alert, oriented. Grossly unremarkable. No focal deficits.   PSYCH: Cooperative, alert & oriented x3

## 2021-08-31 NOTE — ED STATDOCS - ATTENDING CONTRIBUTION TO CARE
I, Feliz Corcoran, performed the initial face to face bedside interview with this patient regarding history of present illness, review of symptoms and relevant past medical, social and family history.  I completed an independent physical examination.  I was the initial provider who evaluated this patient. I have signed out the follow up of any pending tests (i.e. labs, radiological studies) to the ACP.  I have communicated the patient’s plan of care and disposition with the ACP.  The history, relevant review of systems, past medical and surgical history, medical decision making, and physical examination was documented by the scribe in my presence and I attest to the accuracy of the documentation.

## 2021-09-13 LAB — SARS-COV-2 N GENE NPH QL NAA+PROBE: NOT DETECTED

## 2021-09-13 NOTE — DISCHARGE NOTE PROVIDER - NSDCCAREPROVSEEN_GEN_ALL_CORE_FT
To MD:  The above refill request is for a controlled substance. Please review pended medication order. Print and sign for staff to fax to pharmacy or prescribe electronically.     Last office visit: 3/25/2021   Last time refill sent and quantity/refills:
Milton Baumann

## 2021-09-14 ENCOUNTER — APPOINTMENT (OUTPATIENT)
Dept: INTERVENTIONAL RADIOLOGY/VASCULAR | Facility: CLINIC | Age: 61
End: 2021-09-14
Payer: MEDICARE

## 2021-09-14 ENCOUNTER — OUTPATIENT (OUTPATIENT)
Dept: OUTPATIENT SERVICES | Facility: HOSPITAL | Age: 61
LOS: 1 days | End: 2021-09-14

## 2021-09-14 VITALS
SYSTOLIC BLOOD PRESSURE: 156 MMHG | OXYGEN SATURATION: 100 % | HEART RATE: 79 BPM | DIASTOLIC BLOOD PRESSURE: 78 MMHG | TEMPERATURE: 98.3 F | RESPIRATION RATE: 18 BRPM

## 2021-09-14 DIAGNOSIS — I77.0 ARTERIOVENOUS FISTULA, ACQUIRED: Chronic | ICD-10-CM

## 2021-09-14 DIAGNOSIS — Z49.01 ENCOUNTER FOR FITTING AND ADJUSTMENT OF EXTRACORPOREAL DIALYSIS CATHETER: Chronic | ICD-10-CM

## 2021-09-14 DIAGNOSIS — Z00.8 ENCOUNTER FOR OTHER GENERAL EXAMINATION: ICD-10-CM

## 2021-09-14 PROCEDURE — 49083 ABD PARACENTESIS W/IMAGING: CPT

## 2021-09-14 NOTE — ASSESSMENT
[FreeTextEntry1] : IR Post Procedure Note\par \par Procedure: Paracentesis\par \par Diagnosis: Ascites\par \par Contrast: None\par \par Anesthesia: 1% Lidocaine subcutaneous\par \par Estimated Blood Loss: Less than 10cc\par \par Complications: No Immediate Complications\par \par Findings: 3500 cc of clear yellow fluid drained from abdomen via 5F Yueh needle. No acute complications\par

## 2021-09-14 NOTE — HISTORY OF PRESENT ILLNESS
[FreeTextEntry1] : PRE CALL PRIOR TO APPOINTMENT: \par \par Diagnosis: Ascites \par \par COVID-19 SWAB: ADVISED TO GO \par \par RX on file: YES\par \par Referring MD: Fredy Mandujano \par \par Clotting or Bleeding disorders: patient denies \par \par PPM / Defibrillator: patient denies \par \par NPO status advised: yes\par \par History of fall: patient denies / walks with assistance \par \par Assistant device for walking: yes\par \par  home: yes\par \par Blood Thinners: patient denies \par \par Prescribing MD agree to have blood thinner medication held: N/A\par \par Capacity to make decisions: Yes\par \par HCP: yes daughter \par \par DNR: NO, patient denies \par \par Person contact for Pre-Call: DONE by NELLA\par \par --------------------------------------------------------------------------------------------------------\par \par \par \par Nidhi- Operative Assessment: (Day of Procedure)\par \par NPO status: yes\par \par Falls risk: yes, yellow bracelet on \par \par Labs: IN CHART - REVIEWED \par \par IR MD: Dr. Fredy Guillen \par \par Urine Pregnancy: N/A\par \par PRE-OP instructions: YES BY NELLA\par \par POST-OP teaching initiated: YES BY DG\par \par Allergy bracelet on: YES \par \par Antibiotic given: NO \par \par [id number]  ID

## 2021-09-14 NOTE — REASON FOR VISIT
[Procedure: _________] : a [unfilled] procedure visit [FreeTextEntry1] : Diagnosis: Ascites - last done 8/31/2021 4250mL

## 2021-09-28 NOTE — HISTORY OF PRESENT ILLNESS
[FreeTextEntry1] : PRE CALL PRIOR TO APPOINTMENT: \par 9/28/21PI [id number]\par \par Diagnosis: Ascites \par \par COVID-19 SWAB: Advised to go \par \par RX on file: YES\par \par Referring MD: Fredy Mandujano \par \par Clotting or Bleeding disorders: patient denies \par \par PPM / Defibrillator: patient denies \par \par NPO status advised: yes\par \par History of fall: patient denies / walks with assistance \par \par Assistant device for walking: yes\par \par  home: yes\par \par Blood Thinners: patient denies \par \par Prescribing MD agree to have blood thinner medication held: N/A\par \par Capacity to make decisions: Yes\par \par HCP: yes daughter \par \par DNR: NO, patient denies \par \par Person contact for Pre-Call: DONE by NELLA\par \par --------------------------------------------------------------------------------------------------------\par \par \par \par Nidhi- Operative Assessment: (Day of Procedure)\par \par NPO status: yes\par \par Falls risk: yes, yellow bracelet on \par \par Labs: IN CHART - REVIEWED \par \par IR MD: Dr. Leno Mahoney \par \par Urine Pregnancy: N/A\par \par PRE-OP instructions: YES BY NELLA\par \par POST-OP teaching initiated: YES BY DG\par \par Allergy bracelet on: YES \par \par Antibiotic given: NO \par
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
## 2021-10-12 NOTE — HISTORY OF PRESENT ILLNESS
[FreeTextEntry1] : PRE CALL PRIOR TO APPOINTMENT: \par \par Diagnosis: Ascites \par \par COVID-19 SWAB: Advised to go \par \par RX on file: YES\par \par Referring MD: Fredy Mandujano \par \par Clotting or Bleeding disorders: patient denies \par \par PPM / Defibrillator: patient denies \par \par NPO status advised: yes\par \par History of fall: patient denies / walks with assistance \par \par Assistant device for walking: yes\par \par  home: yes\par \par Blood Thinners: patient denies \par \par Prescribing MD agree to have blood thinner medication held: N/A\par \par Capacity to make decisions: Yes\par \par HCP: yes daughter \par \par DNR: NO, patient denies \par \par Person contact for Pre-Call: DONE by NELLA\par \par --------------------------------------------------------------------------------------------------------\par \par \par \par Nidhi- Operative Assessment: (Day of Procedure)\par \par NPO status: yes\par \par Falls risk: yes, yellow bracelet on \par \par Labs: IN CHART - REVIEWED \par \par IR MD: Dr. Leno Mahoney \par \par Urine Pregnancy: N/A\par \par PRE-OP instructions: YES BY NELLA\par \par POST-OP teaching initiated: YES BY DG\par \par Allergy bracelet on: YES \par \par Antibiotic given: NO

## 2021-10-27 NOTE — HISTORY OF PRESENT ILLNESS
[FreeTextEntry1] : PRE CALL PRIOR TO APPOINTMENT: \par \par Diagnosis: Ascites \par \par COVID-19 SWAB: Advised to go \par \par RX on file: YES\par \par Referring MD: Fredy Mandujano \par \par Clotting or Bleeding disorders: patient denies \par \par PPM / Defibrillator: patient denies \par \par NPO status advised: yes\par \par History of fall: patient denies / walks with assistance \par \par Assistant device for walking: yes\par \par  home: yes\par \par Blood Thinners: patient denies \par \par Prescribing MD agree to have blood thinner medication held: N/A\par \par Capacity to make decisions: Yes\par \par HCP: yes daughter \par \par DNR: NO, patient denies \par \par Person contact for Pre-Call: DONE by DG\par \par --------------------------------------------------------------------------------------------------------\par \par \par \par Nidhi- Operative Assessment: (Day of Procedure)\par [id number]\par NPO status: yes\par \par Falls risk: yes, yellow bracelet on \par \par Labs: IN CHART - REVIEWED \par \par IR MD: Dr. Leno Mahoney \par \par Urine Pregnancy: N/A\par \par PRE-OP instructions: YES\par \par POST-OP teaching initiated: YES\par \par Allergy bracelet on: YES \par \par Antibiotic given: NO \par

## 2021-11-01 NOTE — PATIENT PROFILE ADULT. - FUNCTIONAL SCREEN CURRENT LEVEL: TRANSFERRING, MLM
Spoke with Keyanna Salcido at [facility] and notified letter will be faxed over. [phone]

## 2021-11-18 NOTE — HISTORY OF PRESENT ILLNESS
[FreeTextEntry1] : PRE CALL PRIOR TO APPOINTMENT: \par \par Diagnosis: Ascites \par \par COVID-19 SWAB: Advised to go \par \par RX on file: YES\par \par Referring MD: Fredy Mandujano \par \par Clotting or Bleeding disorders: patient denies \par \par PPM / Defibrillator: patient denies \par \par NPO status advised: yes\par \par History of fall: patient denies / walks with assistance \par \par Assistant device for walking: yes\par \par  home: yes\par \par Blood Thinners: patient denies \par \par Prescribing MD agree to have blood thinner medication held: N/A\par \par Capacity to make decisions: Yes\par \par HCP: yes daughter \par \par DNR: NO, patient denies \par \par Person contact for Pre-Call: DONE by DG\par \par --------------------------------------------------------------------------------------------------------\par \par \par \par Nidhi- Operative Assessment: (Day of Procedure)\par \par NPO status: yes\par \par Falls risk: yes, yellow bracelet on \par \par Labs: IN CHART - REVIEWED \par \par IR MD: Dr. Leno Mahoney \par \par Urine Pregnancy: N/A\par \par PRE-OP instructions: YES\par \par POST-OP teaching initiated: YES\par \par Allergy bracelet on: YES \par \par Antibiotic given: NO \par

## 2021-11-18 NOTE — REASON FOR VISIT
[Procedure: _________] : a [unfilled] procedure visit [Source: ______] : History obtained from [unfilled] [FreeTextEntry1] : Diagnosis: Ascites  [Interpreters_IDNumber] : [id number] [Interpreters_FullName] : Denise [TWNoteComboBox1] : [place]

## 2021-11-29 NOTE — PROGRESS NOTE ADULT - PROBLEM SELECTOR PROBLEM 7
Date of Office Visit: 21  Encounter Provider: SAI Armando  Place of Service: [facility] CARDIOLOGY  Patient Name: Maria Teresa Galaviz  :1933    Chief Complaint   Patient presents with   • Coronary Artery Disease   :     HPI: Maria Teresa Galaviz is a 88 y.o. female  with coronary artery disease is post coronary artery bypass grafting, hypertension, hyperlipidemia, peripheral vascular disease, carotid artery disease status post left carotid endarterectomy, history of colon cancer, and shingles.     In the past she has had multiple attempts at angioplasty to the right coronary artery.  She ultimately had a CABG with a single right internal mammary graft to the right coronary artery.  He had a positive stress test in 2001 and then proceeded to cardiac catheterization which showed the graft to the RCA to be patent.  She had no real significant other disease.  She was also found to have severe carotid artery stenosis and had a left carotid endarterectomy.     In 2013 she presented with dyspnea with activity.  She had an echocardiogram that showed normal left ventricular function and grade 1 diastolic dysfunction.  Right ventricular systolic pressures were normal and there was no aortic insufficiency.  She had a perfusion stress test around that time which was also normal.  Left ventricular systolic function was normal.     She was later found with pulmonary nodules.  She ultimately had a head scan which found something light of her cecum.  She had a colonoscopy which confirmed cancer of the colon.  She was also found to have pleural thickening.     She was hospitalized with acute blood loss anemia secondary to upper GI bleed had some bradycardia 2021.  She had acute kidney injury and nephrology manage.  Valsartan, hydralazine and spironolactone as well as amlodipine were all stopped due to bradycardia and acute kidney injury.  Metoprolol  "was stopped due to bradycardia completely and she was discharged with a 7-day monitor. Amlodipine was added back outpatient when her BP was 172/88.  She reported fatigue, dyspnea on exertion and dizziness.  Echocardiogram November 2021 showed normal left ventricular systolic function with grade 1 diastolic dysfunction and mild to moderate concentric hypertrophy.  There was mild calcification of the aortic valve without aortic valve regurgitation.  There was mild mitral annular calcification with no significant regurgitation.  RVSP was normal.  Perfusion stress test was negative for ischemia.    She presents today for follow-up.  Her blood pressures responded well to the addition of amlodipine.  She has follow-up with her nephrologist this week.  She continues to complain of her legs feeling heavy and she \"finds it depressing\" that she is so swelling.  Her thighs are rubbing together.  Her weight has not significantly changed since her last visit.  She also reportedly followed up with her oncologist since her last visit.  She denies chest pain or worsening breathing.  She does housework at home.          No Known Allergies        Family and social history reviewed.     ROS  All other systems were reviewed and are negative          Objective:     Vitals:    11/29/21 1026   BP: 130/80   Pulse: 53   Weight: 85.7 kg (189 lb)   Height: 160 cm (63\")     Body mass index is 33.48 kg/m².    PHYSICAL EXAM:  Constitutional:       General: Not in acute distress.     Appearance: Well-developed. Not diaphoretic.   HENT:      Head: Normocephalic.   Pulmonary:      Effort: Pulmonary effort is normal. No respiratory distress.      Breath sounds: Normal breath sounds. No wheezing. No rhonchi. No rales.   Cardiovascular:      Normal rate. Regular rhythm.   Pulses:     Radial: 2+ bilaterally.  Edema:     Pretibial: bilateral edema of the pretibial area.     Ankle: bilateral edema of the ankle.  Skin:     General: Skin is warm and dry. " There is no cyanosis.      Findings: No rash.   Neurological:      Mental Status: Alert and oriented to person, place, and time.   Psychiatric:         Behavior: Behavior normal.         Thought Content: Thought content normal.         Judgment: Judgment normal.           ECG 12 Lead    Date/Time: 11/29/2021 10:59 AM  Performed by: Jennifer Brantley APRN  Authorized by: Jennifer Brantley APRN   Comparison: compared with previous ECG   Similar to previous ECG  Rhythm: sinus rhythm  Rate: normal  Conduction: 1st degree AV block  Q waves: III and aVF                  Current Outpatient Medications   Medication Sig Dispense Refill   • amLODIPine (NORVASC) 5 MG tablet Take 5 mg by mouth Daily.     • Cholecalciferol (VITAMIN D3) 2000 UNITS tablet Take 2,000 Units by mouth Every Night.     • Cyanocobalamin (B-12) 1000 MCG tablet Take 1 tablet by mouth Every Night.     • docusate sodium (COLACE) 100 MG capsule Take 100 mg by mouth 2 (Two) Times a Day.     • ferrous sulfate 325 (65 FE) MG tablet Take 1 tablet by mouth 2 (Two) Times a Day With Meals. 60 tablet 3   • furosemide (LASIX) 40 MG tablet TAKE 1 TABLET BY MOUTH EVERY DAY 90 tablet 0   • Polyethylene Glycol 3350 (MIRALAX PO) Take  by mouth As Needed.     • rosuvastatin (CRESTOR) 20 MG tablet TAKE 1 TABLET BY MOUTH EVERY DAY 90 tablet 2     No current facility-administered medications for this visit.     Assessment:      No diagnosis found.     No orders of the defined types were placed in this encounter.        Plan:       1.  88-year-old female with coronary artery disease with history of failed angioplasties to the right coronary artery.  Status post CABG with single right internal mammary graft to the right coronary artery with preserved left ventricular systolic function. follow-up catheterization in 2001 which showed the patent grafts and no other significant disease.  Normal perfusion stress test in 11/2021  2.  Hypertention-she is back on amlodipine in addition to  furosemide..  She is to remain off metoprolol due to bradycardia.  We will continue to hold valsartan and spironolactone until she follows back up with nephrology.    She has an appointment this week with Dr. Mic Garcia.  Would prefer addition of spironolactone with follow-up renal function considering patient's complaint of swelling.  Patient is to update me after seeing him.  3.  Hyperlipidemia-she is on rosuvastatin 20 mg at target dose.     4.  Carotid artery disease status post left carotid endarterectomy-she is followed by Dr. Han's office.      5.  Hyperparathyroidism status post parathyroidectomy     6. History of colon cancer- Dr. Varela with gastroenterology. Dr Elise with surgery     7.  History of left pleural thickening and lung nodules-followed by Dr. Junior with pulmonary with routine CTs  8.  Sinus bradycardia with first-degree AV block-had a Zio patch 2/2/2021 showed no atrial fibrillation.  Average heart rate was 55 she is to continue off metoprolol.    This is stable  9.  GI bleed status post EGD/colonoscopy September 2021 Colon biopsy showed tubular adenoma  10.  Chronic kidney disease-she is now following with Dr. Hui-last creatinine 2 weeks ago was 1.35 with GFR of 37.  In September her creatinine was 2.06 with a GFR of 23.  Continue to hold valsartan and spironolactone as listed above  11.  Iron deficient anemia as well as elevated erythropoietin-she is following with hematology                It has been a pleasure to participate in this patient's care.      Thank you,  SAI Armando      **I used Dragon to dictate this note:**   VTE (venous thromboembolism)

## 2021-12-13 NOTE — ASSESSMENT
[FreeTextEntry1] : Procedure was performed by Dr JONELLE Mcmahon.  Please see the official dictated report.

## 2021-12-13 NOTE — HISTORY OF PRESENT ILLNESS
[FreeTextEntry1] : PRE CALL PRIOR TO APPOINTMENT:  12/3/21 spoke with Rhiannon\par \par Diagnosis: Ascites \par \par COVID-19 SWAB: Advised to go \par \par RX on file: YES\par \par Referring MD: Fredy Mandujano \par \par Clotting or Bleeding disorders: patient denies \par \par PPM / Defibrillator: patient denies \par \par NPO status advised: yes\par \par History of fall: patient denies / walks with assistance \par \par Assistant device for walking: yes\par \par  home: yes\par \par Blood Thinners: patient denies \par \par Prescribing MD agree to have blood thinner medication held: N/A\par \par Capacity to make decisions: Yes\par \par HCP: yes daughter \par \par DNR: NO, patient denies \par \par Person contact for Pre-Call: DONE by ss/LT\par \par --------------------------------------------------------------------------------------------------------\par Nidhi- Operative Assessment: (Day of Procedure)\par \par NPO status: yes\par \par Falls risk: yes, yellow bracelet on \par \par Labs: IN CHART \par \par IR MD: Trever Mcmahon\par \par Urine Pregnancy: N/A\par \par PRE-OP instructions: YES\par \par POST-OP teaching initiated: YES\par \par Allergy bracelet on:  n/a\par \par Antibiotic given: NO

## 2021-12-20 NOTE — ED ADULT TRIAGE NOTE - CHIEF COMPLAINT QUOTE
patient from dialysis center, with complaints of lower abdominal pain which started earlier this afternoon. patient states that she took tylenol without relief

## 2021-12-20 NOTE — ED PROVIDER NOTE - PROGRESS NOTE DETAILS
Dr. Gay notified pt is here with abdominal pain   will follow up in am and dialyze if ct with contrast is done

## 2021-12-20 NOTE — ED PROVIDER NOTE - CLINICAL SUMMARY MEDICAL DECISION MAKING FREE TEXT BOX
s/p ESRD dialyzed today present with abdominal pain , ventral hernia   labs ct s/p ESRD dialyzed today present with abdominal pain , ventral hernia reducible   CT and labs pain management

## 2021-12-20 NOTE — ED PROVIDER NOTE - ABDOMINAL EXAM
soft/nondistended ventral hernia , firm/soft/nondistended ventral hernia ,reducible/soft/nondistended

## 2021-12-20 NOTE — ED PROVIDER NOTE - OBJECTIVE STATEMENT
62 y/o pt with h/o Esrd dialysis M,WF, afib , HTn DM   comes in c/o abdominal pain took tylenol w/o improvement

## 2021-12-21 NOTE — CONSULT NOTE ADULT - SUBJECTIVE AND OBJECTIVE BOX
Patient is a 61y old  Female who presents with a chief complaint of Abdominal pain with nausea and vomiting (21 Dec 2021 09:31)      HPI:  62 yo female with hx of ESRD (MWF), HTN, OA, hypothyroid, A. Fib, Hx of GI Bleed, pericardial effusion and recent abdominal surgery presents with complaints of abdominal pain associated with nausea and vomiting. Upper sorbian interpret used at bedside who translated for the patient. Patient states that she has mid abdominal wall pain without any radiation but has been having difficulty keeping food down. Reports multiple episodes of vomiting without any blood. Pt. with h/o non-alcoholic cirrhosis with moderate ascites which was tapped in the ED for diagnostic purposes and was negative for SBP. CT here showed a cirrhotic appearing liver with moderate ascites.      REVIEW OF SYSTEMS:  Constitutional: No fever, weight loss or fatigue  ENMT:  No difficulty hearing, tinnitus, vertigo; No sinus or throat pain  Respiratory: No cough, wheezing, chills or hemoptysis  Cardiovascular: No chest pain, palpitations, dizziness or leg swelling  Gastrointestinal: As per HPI. No hematemesis; No diarrhea or constipation. No melena or hematochezia.  Skin: No itching, burning, rashes or lesions   Musculoskeletal: No joint pain or swelling; No muscle, back or extremity pain  Patient has no cardiopulmonary, peripheral vascular, musculoskeletal, dermatological, neurological, gynecological or psychological symptoms or complaints at this time.    PAST MEDICAL & SURGICAL HISTORY:  DM (diabetes mellitus)    HTN (hypertension)    HLD (hyperlipidemia)    End stage renal disease on dialysis    Pericardial effusion    Pleural effusion    Chronic systolic congestive heart failure    Anemia due to chronic kidney disease, unspecified CKD stage    Paroxysmal atrial fibrillation    Coronary artery disease, angina presence unspecified, unspecified vessel or lesion type, unspecified whether native or transplanted heart    Diabetes    End stage renal disease    Encounter for dialysis catheter care    A-V fistula    A-V fistula        FAMILY HISTORY:  Family history of kidney disease in brother (Sibling)        SOCIAL HISTORY:  Smoking Status: [ ] Current, [ ] Former, [x ] Never  Pack Years: N/A. No ETOH or drug abuse history.    MEDICATIONS:  MEDICATIONS  (STANDING):  amLODIPine   Tablet 10 milliGRAM(s) Oral daily  atorvastatin 40 milliGRAM(s) Oral at bedtime  hydrALAZINE 25 milliGRAM(s) Oral three times a day  levothyroxine 50 MICROGram(s) Oral daily  metoprolol succinate ER 50 milliGRAM(s) Oral daily    MEDICATIONS  (PRN):  acetaminophen     Tablet .. 650 milliGRAM(s) Oral every 6 hours PRN Temp greater or equal to 38C (100.4F), Mild Pain (1 - 3)  aluminum hydroxide/magnesium hydroxide/simethicone Suspension 30 milliLiter(s) Oral every 4 hours PRN Dyspepsia  melatonin 3 milliGRAM(s) Oral at bedtime PRN Insomnia  metoclopramide Injectable 10 milliGRAM(s) IV Push every 8 hours PRN Nausea or vomiting  ondansetron Injectable 4 milliGRAM(s) IV Push every 8 hours PRN Nausea and/or Vomiting      Allergies    eggs (Anaphylaxis)  No Known Drug Allergies    Intolerances        Vital Signs Last 24 Hrs  T(C): 37.2 (21 Dec 2021 07:38), Max: 37.5 (21 Dec 2021 04:28)  T(F): 99 (21 Dec 2021 07:38), Max: 99.5 (21 Dec 2021 04:28)  HR: 72 (21 Dec 2021 08:47) (72 - 92)  BP: 152/79 (21 Dec 2021 07:38) (152/77 - 188/94)  BP(mean): 103 (21 Dec 2021 04:28) (103 - 103)  RR: 18 (21 Dec 2021 08:47) (18 - 20)  SpO2: 92% (21 Dec 2021 08:47) (91% - 95%)        PHYSICAL EXAM:    General: Well developed; in no acute distress  HEENT: MMM, conjunctiva pink and sclera anicteric.  Lungs: Clear bilaterally  Cor: RRR S1, S2 only  Gastrointestinal: Abdomen: Soft, non-tender non-distended; Normal bowel sounds; No rebound or guarding or HSM.  Extremities: Normal range of motion, No clubbing, cyanosis or edema  Neurological: Alert and oriented x3  Skin: Warm and dry. No obvious rash      LABS:                        10.5   6.64  )-----------( 246      ( 20 Dec 2021 22:18 )             32.7     12-20    133<L>  |  93<L>  |  16.4  ----------------------------<  199<H>  4.0   |  24.0  |  2.74<H>    Ca    8.9      20 Dec 2021 22:18    TPro  8.2  /  Alb  3.4  /  TBili  0.6  /  DBili  x   /  AST  21  /  ALT  18  /  AlkPhos  207<H>  12-20      Fluid Segmented Granulocytes: 22 % (12-21 @ 07:03)      RADIOLOGY & ADDITIONAL STUDIES:    Patient is a 61y old  Female who presents with a chief complaint of Abdominal pain with nausea and vomiting (21 Dec 2021 09:31)      HPI:  60 yo female with hx of ESRD (MWF), HTN, OA, hypothyroid, A. Fib, Hx of GI Bleed, pericardial effusion and recent abdominal surgery presents with complaints of abdominal pain associated with nausea and vomiting. Kyrgyz interpret used at bedside who translated for the patient. Patient states that she has mid abdominal wall pain without any radiation but has been having difficulty keeping food down. Reports multiple episodes of vomiting without any blood. Pt. with h/o non-alcoholic cirrhosis with moderate ascites which was tapped in the ED for diagnostic purposes and was negative for SBP. CT here showed a cirrhotic appearing liver with moderate ascites. Pt with h/o ESRD on HD and nonalcoholic cirrhosis      REVIEW OF SYSTEMS:  Constitutional: No fever, weight loss or fatigue  ENMT:  No difficulty hearing, tinnitus, vertigo; No sinus or throat pain  Respiratory: No cough, wheezing, chills or hemoptysis  Cardiovascular: No chest pain, palpitations, dizziness or leg swelling  Gastrointestinal: As per HPI. No hematemesis; No diarrhea or constipation. No melena or hematochezia.  Skin: No itching, burning, rashes or lesions   Musculoskeletal: No joint pain or swelling; No muscle, back or extremity pain  Patient has no cardiopulmonary, peripheral vascular, musculoskeletal, dermatological, neurological, gynecological or psychological symptoms or complaints at this time.    PAST MEDICAL & SURGICAL HISTORY:  DM (diabetes mellitus)    HTN (hypertension)    HLD (hyperlipidemia)    End stage renal disease on dialysis    Pericardial effusion    Pleural effusion    Chronic systolic congestive heart failure    Anemia due to chronic kidney disease, unspecified CKD stage    Paroxysmal atrial fibrillation    Coronary artery disease, angina presence unspecified, unspecified vessel or lesion type, unspecified whether native or transplanted heart    Diabetes    End stage renal disease    Encounter for dialysis catheter care    A-V fistula    A-V fistula        FAMILY HISTORY:  Family history of kidney disease in brother (Sibling)        SOCIAL HISTORY:  Smoking Status: [ ] Current, [ ] Former, [x ] Never  Pack Years: N/A. No ETOH or drug abuse history.    MEDICATIONS:  MEDICATIONS  (STANDING):  amLODIPine   Tablet 10 milliGRAM(s) Oral daily  atorvastatin 40 milliGRAM(s) Oral at bedtime  hydrALAZINE 25 milliGRAM(s) Oral three times a day  levothyroxine 50 MICROGram(s) Oral daily  metoprolol succinate ER 50 milliGRAM(s) Oral daily    MEDICATIONS  (PRN):  acetaminophen     Tablet .. 650 milliGRAM(s) Oral every 6 hours PRN Temp greater or equal to 38C (100.4F), Mild Pain (1 - 3)  aluminum hydroxide/magnesium hydroxide/simethicone Suspension 30 milliLiter(s) Oral every 4 hours PRN Dyspepsia  melatonin 3 milliGRAM(s) Oral at bedtime PRN Insomnia  metoclopramide Injectable 10 milliGRAM(s) IV Push every 8 hours PRN Nausea or vomiting  ondansetron Injectable 4 milliGRAM(s) IV Push every 8 hours PRN Nausea and/or Vomiting      Allergies    eggs (Anaphylaxis)  No Known Drug Allergies    Intolerances        Vital Signs Last 24 Hrs  T(C): 37.2 (21 Dec 2021 07:38), Max: 37.5 (21 Dec 2021 04:28)  T(F): 99 (21 Dec 2021 07:38), Max: 99.5 (21 Dec 2021 04:28)  HR: 72 (21 Dec 2021 08:47) (72 - 92)  BP: 152/79 (21 Dec 2021 07:38) (152/77 - 188/94)  BP(mean): 103 (21 Dec 2021 04:28) (103 - 103)  RR: 18 (21 Dec 2021 08:47) (18 - 20)  SpO2: 92% (21 Dec 2021 08:47) (91% - 95%)        PHYSICAL EXAM:    General: Well developed; in no acute distress  HEENT: MMM, conjunctiva pink and sclera anicteric.  Lungs: Clear bilaterally  Cor: RRR S1, S2 only  Gastrointestinal: Abdomen: Soft, non-tender non-distended; Normal bowel sounds; No rebound or guarding or HSM.  REGULO: Pt. refused  Extremities: Normal range of motion, No clubbing, cyanosis or edema  Neurological: Alert and oriented x3  Skin: Warm and dry. No obvious rash      LABS:                        10.5   6.64  )-----------( 246      ( 20 Dec 2021 22:18 )             32.7     12-20    133<L>  |  93<L>  |  16.4  ----------------------------<  199<H>  4.0   |  24.0  |  2.74<H>    Ca    8.9      20 Dec 2021 22:18    TPro  8.2  /  Alb  3.4  /  TBili  0.6  /  DBili  x   /  AST  21  /  ALT  18  /  AlkPhos  207<H>  12-20      Fluid Segmented Granulocytes: 22 % (12-21 @ 07:03)      RADIOLOGY & ADDITIONAL STUDIES:   CT results noted above.

## 2021-12-21 NOTE — CONSULT NOTE ADULT - SUBJECTIVE AND OBJECTIVE BOX
Patient is a 61y old  Female who presents with a chief complaint of Abdominal pain with nausea and vomiting (21 Dec 2021 12:05)      HPI:  2 yo female with hx of ESRD (MWF), HTN, OA, hypothyroid, A. Fib, Hx of GI Bleed, pericardial effusion and recent abdominal surgery presents with complaints of abdominal pain associated with nausea and vomiting. Frisian interpret used at bedside who translated for the patient. Patient states that she has mid abdominal wall pain without any radiation but has been having difficulty keeping food down. Reports multiple episodes of vomiting without any blood. Pt. with h/o non-alcoholic cirrhosis with moderate ascites which was tapped in the ED for diagnostic purposes and was negative for SBP. CT here showed a cirrhotic appearing liver with moderate ascites. Pt with h/o ESRD on HD and nonalcoholic cirrhosis    Above noted , as well as GI evaluation   May require EGD   Feels better now   Had HD yesterday     CT abdomen w/o acute findings     PAST MEDICAL & SURGICAL HISTORY:  DM (diabetes mellitus)    HTN (hypertension)    HLD (hyperlipidemia)    End stage renal disease on dialysis    Pericardial effusion    Pleural effusion    Chronic systolic congestive heart failure    Anemia due to chronic kidney disease, unspecified CKD stage    Paroxysmal atrial fibrillation    Coronary artery disease, angina presence unspecified, unspecified vessel or lesion type, unspecified whether native or transplanted heart    Diabetes    End stage renal disease    Encounter for dialysis catheter care    A-V fistula    A-V fistula        FAMILY HISTORY:  Family history of kidney disease in brother (Sibling)        Social History:    MEDICATIONS  (STANDING):  amLODIPine   Tablet 10 milliGRAM(s) Oral daily  atorvastatin 40 milliGRAM(s) Oral at bedtime  chlorhexidine 2% Cloths 1 Application(s) Topical <User Schedule>  hydrALAZINE 25 milliGRAM(s) Oral three times a day  levothyroxine 50 MICROGram(s) Oral daily  metoprolol succinate ER 50 milliGRAM(s) Oral daily    MEDICATIONS  (PRN):  acetaminophen     Tablet .. 650 milliGRAM(s) Oral every 6 hours PRN Temp greater or equal to 38C (100.4F), Mild Pain (1 - 3)  aluminum hydroxide/magnesium hydroxide/simethicone Suspension 30 milliLiter(s) Oral every 4 hours PRN Dyspepsia  melatonin 3 milliGRAM(s) Oral at bedtime PRN Insomnia  metoclopramide Injectable 10 milliGRAM(s) IV Push every 8 hours PRN Nausea or vomiting  ondansetron Injectable 4 milliGRAM(s) IV Push every 8 hours PRN Nausea and/or Vomiting      Allergies    eggs (Anaphylaxis)  No Known Drug Allergies    Intolerances        Vital Signs Last 24 Hrs  T(C): 37.3 (21 Dec 2021 12:00), Max: 37.5 (21 Dec 2021 04:28)  T(F): 99.1 (21 Dec 2021 12:00), Max: 99.5 (21 Dec 2021 04:28)  HR: 74 (21 Dec 2021 12:00) (72 - 92)  BP: 164/79 (21 Dec 2021 12:00) (152/77 - 188/94)  BP(mean): 103 (21 Dec 2021 04:28) (103 - 103)  RR: 18 (21 Dec 2021 12:00) (18 - 20)  SpO2: 99% (21 Dec 2021 12:00) (91% - 99%)  Daily Height in cm: 160.02 (20 Dec 2021 19:34)    Daily   I&O's Detail    I&O's Summary      PHYSICAL EXAM:  NECK: Supple, No JVD  NERVOUS SYSTEM:  Alert & Oriented X3,   CHEST/LUNG: Clear bilaterally  HEART: No rub  ABDOMEN: Soft, Nontender, No rigidity or rebound   EXTREMITIES:  2+ Peripheral Pulses, No LE edema    LABS:    LABS:                        10.5   6.64  )-----------( 246      ( 20 Dec 2021 22:18 )             32.7     12-20    133<L>  |  93<L>  |  16.4  ----------------------------<  199<H>  4.0   |  24.0  |  2.74<H>    Ca    8.9      20 Dec 2021 22:18    TPro  8.2  /  Alb  3.4  /  TBili  0.6  /  DBili  x   /  AST  21  /  ALT  18  /  AlkPhos  207<H>  12-20    PT/INR - ( 21 Dec 2021 10:25 )   PT: 14.9 sec;   INR: 1.30 ratio             < from: CT Abdomen and Pelvis w/ IV Cont (12.21.21 @ 02:05) >    ACC: [id number] EXAM:  CT ABDOMEN AND PELVIS IC                          PROCEDURE DATE:  12/21/2021          INTERPRETATION:  CLINICAL INFORMATION: 61 years old. Female. abdominal   pain with ventral hernia.    COMPARISON: CT abdomen pelvis 4/8/2021    CONTRAST/COMPLICATIONS:  IV Contrast: Omnipaque 300  95 cc administered  5 cc discarded.  Oral Contrast: NONE  Complications: None reported at time of study completion    PROCEDURE:  CT of the Abdomen and Pelvis was performed.  Sagittal and coronal reformats were performed.    FINDINGS:    LOWER CHEST: 9 mm right middle lobe nodule, unchanged. Mild bibasilar   atelectasis. Nonspecific 9 mm nodule opacity in the lingula, atelectasis   or infectious/inflammatory. Trace right pleural effusion. Cardiomegaly.    LIVER: Nodular contour, concerning for cirrhosis.  BILE DUCTS: Normal caliber.  GALLBLADDER: Cholelithiasis.  SPLEEN: Within normal limits.  PANCREAS: Within normal limits.  ADRENALS: Within normal limits.  KIDNEYS/URETERS: Enhance symmetrically. No hydronephrosis. Mild bilateral   renal atrophy. Small bilateral renal cysts.    BLADDER: Within normal limits.  REPRODUCTIVE ORGANS: Within normal limits.    BOWEL: No bowel obstruction. Appendix is unremarkable. Small bowel   anastomosis in the mid abdomen.  PERITONEUM: Moderate ascites.  VESSELS: Normal caliber abdominal aorta.  RETROPERITONEUM/LYMPH NODES: No lymphadenopathy.  ABDOMINAL WALL: Small amount of fluid in the ventral abdomen at site of   previously seen ventral hernia.  BONES: Degenerative changes in the spine.    IMPRESSION:    No acute CT findings in the abdomen or pelvis.    Cirrhosis, moderate ascites.    --- End of Report ---              < end of copied text >          RADIOLOGY & ADDITIONAL TESTS:

## 2021-12-21 NOTE — HISTORY OF PRESENT ILLNESS
[FreeTextEntry1] : ***PT CURRENTLY IN THE HOSPITAL. APPOINTMENT CANCELLED BY DAUGHTER. NO PROCEDURE DONE.  12/21/2021 @834A\par ------------------------------------------------------------------------------------------------------------------------------------------\par \par PRE CALL PRIOR TO APPOINTMENT: \par \par Diagnosis: Ascites \par \par COVID-19 SWAB: Advised to go \par \par RX on file: YES\par \par Referring MD: Fredy Mandujano \par \par Clotting or Bleeding disorders: patient denies \par \par PPM / Defibrillator: patient denies \par \par NPO status advised: yes\par \par History of fall: patient denies / walks with assistance \par \par Assistant device for walking: yes\par \par  home: yes\par \par Blood Thinners: patient denies \par \par Prescribing MD agree to have blood thinner medication held: N/A\par \par Capacity to make decisions: Yes\par \par HCP: yes daughter \par \par DNR: NO, patient denies \par \par Person contact for Pre-Call: daughter \par

## 2021-12-21 NOTE — HISTORY OF PRESENT ILLNESS
[FreeTextEntry1] : PRE CALL PRIOR TO APPOINTMENT: \par \par Diagnosis: Ascites \par \par COVID-19 SWAB: Advised to go \par \par RX on file: YES\par \par Referring MD: Fredy Madnujano \par \par Clotting or Bleeding disorders: patient denies \par \par PPM / Defibrillator: patient denies \par \par NPO status advised: yes\par \par History of fall: patient denies / walks with assistance \par \par Assistant device for walking: yes\par \par  home: yes\par \par Blood Thinners: patient denies \par \par Prescribing MD agree to have blood thinner medication held: N/A\par \par Capacity to make decisions: Yes\par \par HCP: yes daughter \par \par DNR: NO, patient denies \par \par Person contact for Pre-Call: DONE by DG\par \par --------------------------------------------------------------------------------------------------------\par Nidhi- Operative Assessment: (Day of Procedure)\par \par NPO status: yes\par \par Falls risk: yes, yellow bracelet on \par \par Labs: IN CHART - REVIEWED \par \par IR MD: Dr. Leno Mahoney \par \par Urine Pregnancy: N/A\par \par PRE-OP instructions: YES\par \par POST-OP teaching initiated: YES\par \par Allergy bracelet on: YES \par \par Antibiotic given: NO

## 2021-12-21 NOTE — CONSULT NOTE ADULT - ASSESSMENT
N/V and abdominal pain without evidence of SBP. Unclear etiology. Supportive Rx. Repeat labs ordered. IV Pantoprazole for GI mucosal cytoprotection. N/V and abdominal pain without evidence of SBP. Unclear etiology. Supportive Rx. Repeat labs ordered. IV Pantoprazole for GI mucosal cytoprotection. Eventual EGD prior to discharge for this probalem as well as varices screening. Cirrhosis likely secondary to fluid overload from ESRD.

## 2021-12-21 NOTE — H&P ADULT - ASSESSMENT
60 yo female with hx of ESRD (MWF), HTN, OA, hypothyroid, A. Fib, Hx of GI Bleed, pericardial effusion and recent abdominal surgery presents with complaints of abdominal pain associated with nausea and vomiting. Turkmen interprettor used at bedside who translated for the patient. Patient states that she has mid abdominal wall pain without any radiation but has been having difficulty keeping food down. Reports multiple episodes of vomiting without any blood.     #Abdominal pain associated with Intractable nausea and vomiting  Zofran standing  Reglan PRN  S/P Paracentesis by the ED - Unaware of how much was drained  Will discuss need for a Large volume paracentesis by IR  CT abdomen - Liver shows cirrohosis contour - Not reported on previous CT scans  GI was consulted  C/w Pepcid  ADAT    #ESRD  C/w HD MWF    #HTN  C/w Metoprolol, Norvasc, Hydralazine    #Hypothyroid  C/w Levothyroxine    #OA  Alderonate outpatient    #A. FIB  Not on AC given GI bleed    DVT prophylaxis: HEP    Spoke to patients daughter in law Bhakti [phone] who states that patient lives with her daughter now Keshia [phone] and was updated.          62 yo female with hx of ESRD (MWF), HTN, OA, hypothyroid, A. Fib, Hx of GI Bleed, pericardial effusion and recent abdominal surgery presents with complaints of abdominal pain associated with nausea and vomiting. Irish interprettor used at bedside who translated for the patient. Patient states that she has mid abdominal wall pain without any radiation but has been having difficulty keeping food down. Reports multiple episodes of vomiting without any blood.     #Abdominal pain associated with Intractable nausea and vomiting  Zofran standing  Reglan PRN  S/P Paracentesis by the ED - Unaware of how much was drained  Will discuss need for a Large volume paracentesis by IR - Discussed with IR - possibly tomorrow  CT abdomen - Liver shows cirrohosis contour - Not reported on previous CT scans  GI was consulted  C/w Pepcid  ADAT    #ESRD  C/w HD MWF    #HTN  C/w Metoprolol, Norvasc, Hydralazine    #Hypothyroid  C/w Levothyroxine    #OA  Alderonate outpatient    #A. FIB  Not on AC given GI bleed    DVT prophylaxis: HEP    Spoke to patients daughter in law Bhakti [phone] who states that patient lives with her daughter now Keshia [phone] and was updated.

## 2021-12-21 NOTE — H&P ADULT - NSHPPHYSICALEXAM_GEN_ALL_CORE
PHYSICAL EXAM:  Vital Signs Last 24 Hrs  T(F): 99 (21 Dec 2021 07:38), Max: 99.5 (21 Dec 2021 04:28)  HR: 72 (21 Dec 2021 08:47) (72 - 92)  BP: 152/79 (21 Dec 2021 07:38) (152/77 - 188/94)  RR: 18 (21 Dec 2021 08:47) (18 - 20)  SpO2: 92% (21 Dec 2021 08:47) (91% - 95%)    GENERAL: NAD, Resting in bed  Eyes: EOMI, PERRLA  ENMT: Conjunctiva and sclera clear; supple neck, No JVD  Cardiovascular: S1,S2, RRR, No murmur  Respiratory: CTA B/L, Non-labored breathing  GI: Bowel sounds present; Soft, Nontender, Nondistended. No hepatomegaly  Genitourinary: Deferred  Skin:  no breakdowns, ulcers or discharge, No rashes or lesions  Neurology: Alert & Oriented X3, non-focal and spontaneous movements of all extremities, CN 2 to 12 grossly intact   Psych: Appropriate mood and affect, calm, pleasant, Responds appropriately to questions

## 2021-12-21 NOTE — PATIENT PROFILE ADULT - FALL HARM RISK - UNIVERSAL INTERVENTIONS
Bed in lowest position, wheels locked, appropriate side rails in place/Call bell, personal items and telephone in reach/Instruct patient to call for assistance before getting out of bed or chair/Non-slip footwear when patient is out of bed/[place] to call system/Physically safe environment - no spills, clutter or unnecessary equipment/Purposeful Proactive Rounding/Room/bathroom lighting operational, light cord in reach

## 2021-12-21 NOTE — CONSULT NOTE ADULT - ASSESSMENT
ESRD - Will arrange HD tomorrow     Anemia - Hgb stable     Improved N/V   cirrhosis with chronic ascites  No acute changes on CT abdomen   Lipase normal   GI may do EGD    RO - Not on binders , check phos in am

## 2021-12-21 NOTE — H&P ADULT - HISTORY OF PRESENT ILLNESS
62 yo female with hx of ESRD (MWF), HTN, OA, hypothyroid, A. Fib, Hx of GI Bleed, pericardial effusion and recent abdominal surgery presents with complaints of abdominal pain associated with nausea and vomiting. Serbian interpret used at bedside who translated for the patient. Patient states that she has mid abdominal wall pain without any radiation but has been having difficulty keeping food down. Reports multiple episodes of vomiting without any blood.

## 2021-12-21 NOTE — H&P ADULT - NSHPREVIEWOFSYSTEMS_GEN_ALL_CORE
Review of Systems:  CONSTITUTIONAL: No weakness, fevers or chills  EYES/ENT: No visual changes;  No vertigo or throat pain   NECK: No pain or stiffness  RESPIRATORY: No cough, wheezing, hemoptysis; No shortness of breath,   CARDIOVASCULAR: No chest pain or palpitations  GASTROINTESTINAL: +Abdominal pain, +Nausea and vomiting  GENITOURINARY: No dysuria, frequency or hematuria  NEUROLOGICAL: No numbness or weakness  SKIN: No itching, burning, rashes, or lesions   All other review of systems is negative unless indicated above

## 2021-12-22 NOTE — PROGRESS NOTE ADULT - ASSESSMENT
ESRD - HD today on MWF schedule    Anemia - Hgb stable     Improved N/V   cirrhosis with chronic ascites  No acute changes on CT abdomen   Lipase normal   GI plans noted    RO - will add phoslo TID w meals    Will follow

## 2021-12-22 NOTE — PROGRESS NOTE ADULT - ASSESSMENT
The patient's abdominal pain may have been due to the ascites with distention and appears much better after therapeutic paracentesis.  The etiology of his cirrhosis is unclear but may be due to Hoskins.  The ascites is probably a combination of both the cirrhosis and the renal failure.  However I will order a full set of hepatitis serologies and will plan for an upper endoscopy in a.m.  She will require cardiac clearance however.  We will hold Lovenox for today.  We will advance diet to full liquids.  Continue IV Reglan for the present.   The patient's abdominal pain may have been due to the ascites with distention and appears much better after therapeutic paracentesis.  The etiology of his cirrhosis is unclear but may be due to Hoskins.  The ascites is probably a combination of both the cirrhosis and the renal failure.  However I will order a full set of hepatitis serologies and will plan for an upper endoscopy in a.m.  She will require cardiac clearance however.  We will hold Lovenox for today.  We will advance diet to full liquids.  Continue IV Reglan for the present. Will also add pantoprazole to her medical regimen.

## 2021-12-22 NOTE — PROGRESS NOTE ADULT - ASSESSMENT
The patient is a 61 year old female with a past medical history of  ESRD (MWF), HTN, OA, hypothyroid, A. Fib, Hx of GI Bleed, pericardial effusion and recent abdominal surgery presents with complaints of abdominal pain associated with nausea and vomiting. In the ER, CT abdomen and pelvis with moderate ascites and cirrhosis. Status post IR paracentesis with 3350ml removed.     Assessment/Plan:    1. Abdominal pain secondary to ascites  - now improved  _ Status post IR drainage  - Cirrhosis new diagnosis on CT, likely secondary to VELA vs ESRD  - Plan for EGD in AM< npo after midnight, cardiology consulted for clearance  - AFP WNL    2. ESRD HD MWF    3 . Hypertension  - Continue metoprolol, norvasc, hyrdralazine    4. Hypothyroidism  - On Synthroid    5/ Afib  - Not on AC for history of GIB    VTE- Heparin subcut    Daughter  Keshia [phone]    The patient is a 61 year old female with a past medical history of  ESRD (MWF), HTN, OA, hypothyroid, A. Fib, Hx of GI Bleed, pericardial effusion and recent abdominal surgery presents with complaints of abdominal pain associated with nausea and vomiting. In the ER, CT abdomen and pelvis with moderate ascites and cirrhosis. Status post IR paracentesis with 3350ml removed.     Assessment/Plan:    1. Abdominal pain secondary to ascites  - now improved  _ Status post IR drainage  - Cirrhosis new diagnosis on CT, likely secondary to VELA vs ESRD  - Plan for EGD in AM< npo after midnight, cardiology consulted for clearance  - AFP WNL    2. ESRD HD MWF    3 . Hypertension  - Continue metoprolol, norvasc, hyrdralazine    4. Hypothyroidism  - On Synthroid    5/ Afib  - Not on AC for history of GIB    VTE- Heparin subcut    Daughter  Keshia [phone] updated on patient's condition and plan of care

## 2021-12-22 NOTE — PHYSICAL THERAPY INITIAL EVALUATION ADULT - DIAGNOSIS, PT EVAL
Impaired Functional Mobility due to generalized weakness, (+) Abdominal pain secondary to ascites, ESRD.

## 2021-12-22 NOTE — PROGRESS NOTE ADULT - SUBJECTIVE AND OBJECTIVE BOX
CC: Follow up     INTERVAL HPI/OVERNIGHT EVENTS: Patient seen and examined with . Denies sob, chest pain or palpitations. Complaints of epigastric pain with nausea.       Vital Signs Last 24 Hrs  T(C): 37.1 (22 Dec 2021 13:02), Max: 37.4 (22 Dec 2021 04:51)  T(F): 98.7 (22 Dec 2021 13:02), Max: 99.3 (22 Dec 2021 04:51)  HR: 74 (22 Dec 2021 13:02) (68 - 86)  BP: 160/55 (22 Dec 2021 13:02) (145/73 - 170/80)  BP(mean): --  RR: 18 (22 Dec 2021 11:56) (18 - 18)  SpO2: 100% (22 Dec 2021 11:56) (94% - 100%)    PHYSICAL EXAM:    GENERAL: NAD, AOX3  HEAD:  Atraumatic, Normocephalic  EYES: conjunctiva and sclera clear  ENMT: Moist mucous membranes  NECK: Supple, No JVD  CHEST/LUNG: Clear to auscultation bilaterally; No rales, rhonchi, wheezing, or rubs  HEART: Regular rate and rhythm; No murmurs, rubs, or gallops  ABDOMEN: Soft, Nontender, Nondistended; Bowel sounds present  EXTREMITIES:  2+ Peripheral Pulses, No clubbing, cyanosis, or edema        MEDICATIONS  (STANDING):  amLODIPine   Tablet 10 milliGRAM(s) Oral daily  atorvastatin 40 milliGRAM(s) Oral at bedtime  chlorhexidine 2% Cloths 1 Application(s) Topical <User Schedule>  hydrALAZINE 25 milliGRAM(s) Oral three times a day  levothyroxine 50 MICROGram(s) Oral daily  metoprolol succinate ER 50 milliGRAM(s) Oral daily  pantoprazole    Tablet 40 milliGRAM(s) Oral before breakfast    MEDICATIONS  (PRN):  acetaminophen     Tablet .. 650 milliGRAM(s) Oral every 6 hours PRN Temp greater or equal to 38C (100.4F), Mild Pain (1 - 3)  aluminum hydroxide/magnesium hydroxide/simethicone Suspension 30 milliLiter(s) Oral every 4 hours PRN Dyspepsia  melatonin 3 milliGRAM(s) Oral at bedtime PRN Insomnia  metoclopramide Injectable 10 milliGRAM(s) IV Push every 8 hours PRN Nausea or vomiting  ondansetron Injectable 4 milliGRAM(s) IV Push every 8 hours PRN Nausea and/or Vomiting      Allergies    eggs (Anaphylaxis)  No Known Drug Allergies    Intolerances          LABS:                          9.2    5.79  )-----------( 229      ( 22 Dec 2021 07:06 )             29.2     12-22    132<L>  |  93<L>  |  35.6<H>  ----------------------------<  174<H>  5.4<H>   |  24.0  |  4.85<H>    Ca    8.7      22 Dec 2021 07:06  Phos  6.0     12-22    TPro  6.4  /  Alb  x   /  TBili  x   /  DBili  x   /  AST  x   /  ALT  x   /  AlkPhos  x   12-22    PT/INR - ( 21 Dec 2021 10:25 )   PT: 14.9 sec;   INR: 1.30 ratio               RADIOLOGY & ADDITIONAL TESTS:

## 2021-12-22 NOTE — PROGRESS NOTE ADULT - SUBJECTIVE AND OBJECTIVE BOX
Pt seen and examined: Follow-up for mid abdominal pain with recurrent nausea and vomiting in a patient with nonalcoholic cirrhosis and ascites as well as renal failure on hemodialysis      Yesterday morning the patient underwent a diagnostic paracentesis which was negative for SBP and consistent with ascites due to portal hypertension within albumin gradient of greater than 1.  Yesterday evening she underwent a therapeutic paracentesis by interventional radiology with removal of 3350 mils of fluid.  Her hemoglobin is down today to 9.2 from 10.5 yesterday.  She is due for repeat hemodialysis today.  She is feeling much better with minimal if any abdominal pain and no further nausea or vomiting tolerating clear liquids.    REVIEW OF SYSTEMS:    CONSTITUTIONAL: No fever, weight loss, or fatigue  EYES: No eye pain, visual disturbances, or discharge  ENMT:  No difficulty hearing, tinnitus, vertigo; No sinus or throat pain  RESPIRATORY: No cough, wheezing, chills or hemoptysis; No shortness of breath  CARDIOVASCULAR: No chest pain, palpitations, dizziness, or leg swelling  GASTROINTESTINAL: as above    MEDICATIONS:  MEDICATIONS  (STANDING):  amLODIPine   Tablet 10 milliGRAM(s) Oral daily  atorvastatin 40 milliGRAM(s) Oral at bedtime  chlorhexidine 2% Cloths 1 Application(s) Topical <User Schedule>  hydrALAZINE 25 milliGRAM(s) Oral three times a day  levothyroxine 50 MICROGram(s) Oral daily  metoprolol succinate ER 50 milliGRAM(s) Oral daily  pantoprazole    Tablet 40 milliGRAM(s) Oral before breakfast    MEDICATIONS  (PRN):  acetaminophen     Tablet .. 650 milliGRAM(s) Oral every 6 hours PRN Temp greater or equal to 38C (100.4F), Mild Pain (1 - 3)  aluminum hydroxide/magnesium hydroxide/simethicone Suspension 30 milliLiter(s) Oral every 4 hours PRN Dyspepsia  melatonin 3 milliGRAM(s) Oral at bedtime PRN Insomnia  metoclopramide Injectable 10 milliGRAM(s) IV Push every 8 hours PRN Nausea or vomiting  ondansetron Injectable 4 milliGRAM(s) IV Push every 8 hours PRN Nausea and/or Vomiting      Allergies    eggs (Anaphylaxis)  No Known Drug Allergies    Intolerances        Vital Signs Last 24 Hrs  T(C): 36.9 (22 Dec 2021 08:07), Max: 37.4 (22 Dec 2021 04:51)  T(F): 98.5 (22 Dec 2021 08:07), Max: 99.3 (22 Dec 2021 04:51)  HR: 75 (22 Dec 2021 08:07) (72 - 86)  BP: 145/73 (22 Dec 2021 08:07) (145/73 - 170/80)  BP(mean): --  RR: 18 (22 Dec 2021 08:07) (18 - 18)  SpO2: 95% (22 Dec 2021 08:07) (92% - 99%)      PHYSICAL EXAM:    General: Well developed in no acute distress  HEENT: MMM, conjunctiva and sclera clear  Gastrointestinal:Abdomen: Soft non-tender non-distended; Normal bowel sounds; No hepatosplenomegaly  Extremities: no cyanosis, clubbing or edema.  Skin: Warm and dry. No obvious rash    LABS:      CBC Full  -  ( 22 Dec 2021 07:06 )  WBC Count : 5.79 K/uL  RBC Count : 3.24 M/uL  Hemoglobin : 9.2 g/dL  Hematocrit : 29.2 %  Platelet Count - Automated : 229 K/uL  Mean Cell Volume : 90.1 fl  Mean Cell Hemoglobin : 28.4 pg  Mean Cell Hemoglobin Concentration : 31.5 gm/dL  Auto Neutrophil # : 4.04 K/uL  Auto Lymphocyte # : 0.75 K/uL  Auto Monocyte # : 0.83 K/uL  Auto Eosinophil # : 0.13 K/uL  Auto Basophil # : 0.02 K/uL  Auto Neutrophil % : 69.9 %  Auto Lymphocyte % : 13.0 %  Auto Monocyte % : 14.3 %  Auto Eosinophil % : 2.2 %  Auto Basophil % : 0.3 %    12-22    132<L>  |  93<L>  |  35.6<H>  ----------------------------<  174<H>  5.4<H>   |  24.0  |  4.85<H>    Ca    8.7      22 Dec 2021 07:06  Phos  6.0     12-22    TPro  8.2  /  Alb  3.4  /  TBili  0.6  /  DBili  x   /  AST  21  /  ALT  18  /  AlkPhos  207<H>  12-20    PT/INR - ( 21 Dec 2021 10:25 )   PT: 14.9 sec;   INR: 1.30 ratio                           RADIOLOGY & ADDITIONAL STUDIES (The following images were personally reviewed): Pt seen and examined: Follow-up for mid abdominal pain with recurrent nausea and vomiting in a patient with nonalcoholic cirrhosis and ascites as well as renal failure on hemodialysis      Yesterday morning the patient underwent a diagnostic paracentesis which was negative for SBP and consistent with ascites due to portal hypertension within albumin gradient of greater than 1.  Yesterday evening she underwent a therapeutic paracentesis by interventional radiology with removal of 3350 mils of fluid.  Her hemoglobin is down today to 9.2 from 10.5 yesterday.  She is due for repeat hemodialysis today.  She is feeling much better with minimal if any abdominal pain and no further nausea or vomiting tolerating clear liquids. She required a surgical repair of an incarcerated ventral hernia in March of this year without mesh and required a partial small bowel resection.      REVIEW OF SYSTEMS:    CONSTITUTIONAL: No fever, weight loss, or fatigue  EYES: No eye pain, visual disturbances, or discharge  ENMT:  No difficulty hearing, tinnitus, vertigo; No sinus or throat pain  RESPIRATORY: No cough, wheezing, chills or hemoptysis; No shortness of breath  CARDIOVASCULAR: No chest pain, palpitations, dizziness, or leg swelling  GASTROINTESTINAL: as above    MEDICATIONS:  MEDICATIONS  (STANDING):  amLODIPine   Tablet 10 milliGRAM(s) Oral daily  atorvastatin 40 milliGRAM(s) Oral at bedtime  chlorhexidine 2% Cloths 1 Application(s) Topical <User Schedule>  hydrALAZINE 25 milliGRAM(s) Oral three times a day  levothyroxine 50 MICROGram(s) Oral daily  metoprolol succinate ER 50 milliGRAM(s) Oral daily  pantoprazole    Tablet 40 milliGRAM(s) Oral before breakfast    MEDICATIONS  (PRN):  acetaminophen     Tablet .. 650 milliGRAM(s) Oral every 6 hours PRN Temp greater or equal to 38C (100.4F), Mild Pain (1 - 3)  aluminum hydroxide/magnesium hydroxide/simethicone Suspension 30 milliLiter(s) Oral every 4 hours PRN Dyspepsia  melatonin 3 milliGRAM(s) Oral at bedtime PRN Insomnia  metoclopramide Injectable 10 milliGRAM(s) IV Push every 8 hours PRN Nausea or vomiting  ondansetron Injectable 4 milliGRAM(s) IV Push every 8 hours PRN Nausea and/or Vomiting      Allergies    eggs (Anaphylaxis)  No Known Drug Allergies    Intolerances        Vital Signs Last 24 Hrs  T(C): 36.9 (22 Dec 2021 08:07), Max: 37.4 (22 Dec 2021 04:51)  T(F): 98.5 (22 Dec 2021 08:07), Max: 99.3 (22 Dec 2021 04:51)  HR: 75 (22 Dec 2021 08:07) (72 - 86)  BP: 145/73 (22 Dec 2021 08:07) (145/73 - 170/80)  BP(mean): --  RR: 18 (22 Dec 2021 08:07) (18 - 18)  SpO2: 95% (22 Dec 2021 08:07) (92% - 99%)      PHYSICAL EXAM:    General: Well developed in no acute distress  HEENT: MMM, conjunctiva and sclera clear  Gastrointestinal:Abdomen: Soft non-tender non-distended; Normal bowel sounds; No hepatosplenomegaly  Extremities: no cyanosis, clubbing or edema.  Skin: Warm and dry. No obvious rash    LABS:      CBC Full  -  ( 22 Dec 2021 07:06 )  WBC Count : 5.79 K/uL  RBC Count : 3.24 M/uL  Hemoglobin : 9.2 g/dL  Hematocrit : 29.2 %  Platelet Count - Automated : 229 K/uL  Mean Cell Volume : 90.1 fl  Mean Cell Hemoglobin : 28.4 pg  Mean Cell Hemoglobin Concentration : 31.5 gm/dL  Auto Neutrophil # : 4.04 K/uL  Auto Lymphocyte # : 0.75 K/uL  Auto Monocyte # : 0.83 K/uL  Auto Eosinophil # : 0.13 K/uL  Auto Basophil # : 0.02 K/uL  Auto Neutrophil % : 69.9 %  Auto Lymphocyte % : 13.0 %  Auto Monocyte % : 14.3 %  Auto Eosinophil % : 2.2 %  Auto Basophil % : 0.3 %    12-22    132<L>  |  93<L>  |  35.6<H>  ----------------------------<  174<H>  5.4<H>   |  24.0  |  4.85<H>    Ca    8.7      22 Dec 2021 07:06  Phos  6.0     12-22    TPro  8.2  /  Alb  3.4  /  TBili  0.6  /  DBili  x   /  AST  21  /  ALT  18  /  AlkPhos  207<H>  12-20    PT/INR - ( 21 Dec 2021 10:25 )   PT: 14.9 sec;   INR: 1.30 ratio                           RADIOLOGY & ADDITIONAL STUDIES (The following images were personally reviewed):

## 2021-12-22 NOTE — CONSULT NOTE ADULT - REASON FOR ADMISSION
Abdominal pain with nausea and vomiting

## 2021-12-22 NOTE — PROGRESS NOTE ADULT - SUBJECTIVE AND OBJECTIVE BOX
NEPHROLOGY INTERVAL HPI/OVERNIGHT EVENTS:    Examined  No distress  Denies AH CP no SOB  feeling better    MEDICATIONS  (STANDING):  amLODIPine   Tablet 10 milliGRAM(s) Oral daily  atorvastatin 40 milliGRAM(s) Oral at bedtime  chlorhexidine 2% Cloths 1 Application(s) Topical <User Schedule>  hydrALAZINE 25 milliGRAM(s) Oral three times a day  levothyroxine 50 MICROGram(s) Oral daily  metoprolol succinate ER 50 milliGRAM(s) Oral daily  pantoprazole    Tablet 40 milliGRAM(s) Oral before breakfast    MEDICATIONS  (PRN):  acetaminophen     Tablet .. 650 milliGRAM(s) Oral every 6 hours PRN Temp greater or equal to 38C (100.4F), Mild Pain (1 - 3)  aluminum hydroxide/magnesium hydroxide/simethicone Suspension 30 milliLiter(s) Oral every 4 hours PRN Dyspepsia  melatonin 3 milliGRAM(s) Oral at bedtime PRN Insomnia  metoclopramide Injectable 10 milliGRAM(s) IV Push every 8 hours PRN Nausea or vomiting  ondansetron Injectable 4 milliGRAM(s) IV Push every 8 hours PRN Nausea and/or Vomiting      Allergies    eggs (Anaphylaxis)  No Known Drug Allergies    Intolerances        Vital Signs Last 24 Hrs  T(C): 37.1 (22 Dec 2021 13:02), Max: 37.4 (22 Dec 2021 04:51)  T(F): 98.7 (22 Dec 2021 13:02), Max: 99.3 (22 Dec 2021 04:51)  HR: 74 (22 Dec 2021 13:02) (68 - 86)  BP: 160/55 (22 Dec 2021 13:02) (145/73 - 170/80)  BP(mean): --  RR: 18 (22 Dec 2021 11:56) (18 - 18)  SpO2: 100% (22 Dec 2021 11:56) (94% - 100%)  Daily     Daily     PHYSICAL EXAM:  NECK: Supple, No JVD  NERVOUS SYSTEM:  Alert & Oriented X3,   CHEST/LUNG: Clear bilaterally  HEART: No rub  ABDOMEN: Soft, Nontender, No rigidity or rebound   EXTREMITIES:  2+ Peripheral Pulses, No LE edema    LABS:                        9.2    5.79  )-----------( 229      ( 22 Dec 2021 07:06 )             29.2     12-22    132<L>  |  93<L>  |  35.6<H>  ----------------------------<  174<H>  5.4<H>   |  24.0  |  4.85<H>    Ca    8.7      22 Dec 2021 07:06  Phos  6.0     12-22    TPro  6.4  /  Alb  x   /  TBili  x   /  DBili  x   /  AST  x   /  ALT  x   /  AlkPhos  x   12-22    PT/INR - ( 21 Dec 2021 10:25 )   PT: 14.9 sec;   INR: 1.30 ratio             Phosphorus Level, Serum: 6.0 mg/dL (12-22 @ 07:06)          RADIOLOGY & ADDITIONAL TESTS:

## 2021-12-22 NOTE — CONSULT NOTE ADULT - SUBJECTIVE AND OBJECTIVE BOX
Patient is a 61y old  Female who presents with a chief complaint of Abdominal pain with nausea and vomiting (22 Dec 2021 13:01)        PAST MEDICAL & SURGICAL HISTORY:  DM (diabetes mellitus)    HTN (hypertension)    HLD (hyperlipidemia)    End stage renal disease on dialysis    Pericardial effusion    Pleural effusion    Chronic systolic congestive heart failure    Anemia due to chronic kidney disease, unspecified CKD stage    Paroxysmal atrial fibrillation    Coronary artery disease, angina presence unspecified, unspecified vessel or lesion type, unspecified whether native or transplanted heart    Diabetes    End stage renal disease    Encounter for dialysis catheter care    A-V fistula    A-V fistula     CHEST CT PULMONARY ANGIO with IV Contrast:  FINDINGS:  MEDICATIONS  (STANDING):  amLODIPine   Tablet 10 milliGRAM(s) Oral daily  atorvastatin 40 milliGRAM(s) Oral at bedtime  chlorhexidine 2% Cloths 1 Application(s) Topical <User Schedule>  hydrALAZINE 25 milliGRAM(s) Oral three times a day  levothyroxine 50 MICROGram(s) Oral daily  metoprolol succinate ER 50 milliGRAM(s) Oral daily  pantoprazole    Tablet 40 milliGRAM(s) Oral before breakfast    MEDICATIONS  (PRN):  acetaminophen     Tablet .. 650 milliGRAM(s) Oral every 6 hours PRN Temp greater or equal to 38C (100.4F), Mild Pain (1 - 3)  aluminum hydroxide/magnesium hydroxide/simethicone Suspension 30 milliLiter(s) Oral every 4 hours PRN Dyspepsia  melatonin 3 milliGRAM(s) Oral at bedtime PRN Insomnia  metoclopramide Injectable 10 milliGRAM(s) IV Push every 8 hours PRN Nausea or vomiting  ondansetron Injectable 4 milliGRAM(s) IV Push every 8 hours PRN Nausea and/or Vomiting        Vital Signs Last 24 Hrs  T(C): 37.1 (22 Dec 2021 13:02), Max: 37.4 (22 Dec 2021 04:51)  T(F): 98.7 (22 Dec 2021 13:02), Max: 99.3 (22 Dec 2021 04:51)  HR: 74 (22 Dec 2021 13:02) (68 - 86)  BP: 160/55 (22 Dec 2021 13:02) (145/73 - 170/80)  BP(mean): --  RR: 18 (22 Dec 2021 11:56) (18 - 18)  SpO2: 100% (22 Dec 2021 11:56) (94% - 100%)      I&O's Detail    22 Dec 2021 07:01  -  22 Dec 2021 14:29  --------------------------------------------------------  IN:  Total IN: 0 mL    OUT:    Other (mL): 2000 mL  Total OUT: 2000 mL    Total NET: -2000 mL          LABS:                        9.2    5.79  )-----------( 229      ( 22 Dec 2021 07:06 )             29.2     12-22    132<L>  |  93<L>  |  35.6<H>  ----------------------------<  174<H>  5.4<H>   |  24.0  |  4.85<H>    Ca    8.7      22 Dec 2021 07:06  Phos  6.0     12-22    TPro  6.4  /  Alb  x   /  TBili  x   /  DBili  x   /  AST  x   /  ALT  x   /  AlkPhos  x   12-22        PT/INR - ( 21 Dec 2021 10:25 )   PT: 14.9 sec;   INR: 1.30 ratio             BNP  I&O's Detail    22 Dec 2021 07:01  -  22 Dec 2021 14:29  --------------------------------------------------------  IN:  Total IN: 0 mL    OUT:    Other (mL): 2000 mL  Total OUT: 2000 mL    Total NET: -2000 mL        Daily     Daily     RADIOLOGY & ADDITIONAL STUDIES:

## 2021-12-22 NOTE — PHYSICAL THERAPY INITIAL EVALUATION ADULT - ADDITIONAL COMMENTS
as per pt: resides in the private house with 10 steps to enter (+) rail, pt has assistance (level not given) ascending and descending stairs. ambulates with RW, (-) driving Family available to assist as needed upon D/C home

## 2021-12-22 NOTE — CONSULT NOTE ADULT - ASSESSMENT
IMPRESSION:     62 yo F w/ h/o ESRD/HD, NICM, PAF, SVT *who had paracentesis performed yesterday in the setting of ascites that is likely combination of cirrhosis and renal failure.     *12/22: appreciate plan for EGD      PLAN:    1. There are no cardiac contraindications to proceeding with EGD/related procedures

## 2021-12-23 NOTE — PROGRESS NOTE ADULT - REASON FOR ADMISSION
Abdominal pain with nausea and vomiting

## 2021-12-23 NOTE — DISCHARGE NOTE PROVIDER - CARE PROVIDER_API CALL
Elian Oliveira)  Gastroenterology; Internal Medicine  [address], [address]  [place] [address]  Phone: [phone]  Fax: [phone]  Follow Up Time:     Kelvin Kevin (DO)  Medicine  [address], [place] [address]  Phone: [phone]  Fax: [phone]  Follow Up Time:     Primary Care Provider,   Phone: (   )    -  Fax: (   )    -  Follow Up Time:

## 2021-12-23 NOTE — DISCHARGE NOTE PROVIDER - CARE PROVIDERS DIRECT ADDRESSES
[email or web address],[email or web address],DirectAddress_Unknown

## 2021-12-23 NOTE — DISCHARGE NOTE NURSING/CASE MANAGEMENT/SOCIAL WORK - PATIENT PORTAL LINK FT
You can access the FollowMyHealth Patient Portal offered by [facility] by registering at the following website: [email or web address]. By joining [facility]’s FollowMyHealth portal, you will also be able to view your health information using other applications (apps) compatible with our system.

## 2021-12-23 NOTE — DISCHARGE NOTE PROVIDER - HOSPITAL COURSE
60 yo female with hx of ESRD (MWF), HTN, OA, hypothyroid, A. Fib, Hx of GI Bleed, pericardial effusion, and recent abdominal surgery (surgical repair of incarcerated ventral hernia that required partial small bowel resection March 2021) who presented to the ED with abdominal pain with associated nausea and vomiting. CT abdomen and pelvis done in ER significant for moderate ascites and cirrhosis, likely secondary to VELA vs ESRD. In ED patient underwent diagnostic paracentesis and was negative for spontaneous bacterial peritonitis. Patient then underwent therapeutic paracentesis with IR with 3350mL removed, which helped improve patient's symptoms. GI recommending adding pantoprazole to medication regimen. EGD was then performed, noted for ...... Patient is being followed by Nephrology for ESRD, received hemodialysis 12/22. Patient was evaluated by physical therapy who are recommending discharge home with either assist or home PT. At this time patient is medically stable for discharge home.   61 year old female with history of ESRD (on HD MWF), HTN, OA, hypothyroidism, A. Fib, Hx of GI Bleed, pericardial effusion, and recent abdominal surgery (surgical repair of incarcerated ventral hernia that required partial small bowel resection March 2021) who presented to the ED with abdominal pain with associated nausea and vomiting. CT abdomen and pelvis done in ER significant for moderate ascites and cirrhosis, likely secondary to VELA vs ESRD. In ED patient underwent diagnostic paracentesis and was negative for spontaneous bacterial peritonitis. Patient then underwent therapeutic paracentesis with IR with 3350mL removed, which helped improve patient's symptoms. GI recommending adding pantoprazole to medication regimen. EGD was then performed, notable for ...... Patient is being followed by Nephrology for ESRD, received hemodialysis 12/22. Patient was evaluated by physical therapy who are recommending discharge home with either assist or home PT. At this time patient is medically stable for discharge home.   61 year old female with history of ESRD (on HD MWF), HTN, OA, hypothyroidism, A. Fib, Hx of GI Bleed, pericardial effusion, and recent abdominal surgery (surgical repair of incarcerated ventral hernia that required partial small bowel resection March 2021) who presented to the ED with abdominal pain with associated nausea and vomiting. CT abdomen and pelvis done in ER significant for moderate ascites and cirrhosis, likely secondary to VELA vs ESRD. In ED patient underwent diagnostic paracentesis and was negative for spontaneous bacterial peritonitis. Patient then underwent therapeutic paracentesis by IR with 3350mL removed, which helped improve patient's symptoms. EGD was then performed, notable for ......     Patient is being followed by Nephrology for ESRD, received hemodialysis 12/22. At this time patient is with overall improvement and is medically stable for discharge.        Discharge planning time: 35 minutes   61 year old female with history of ESRD (on HD MWF), HTN, OA, hypothyroidism, A. Fib, Hx of GI Bleed, pericardial effusion, and recent abdominal surgery (surgical repair of incarcerated ventral hernia that required partial small bowel resection March 2021) who presented to the ED with abdominal pain with associated nausea and vomiting. CT abdomen and pelvis done in ER significant for moderate ascites and cirrhosis, likely secondary to VELA vs ESRD. In ED patient underwent diagnostic paracentesis and was negative for spontaneous bacterial peritonitis. Patient then underwent therapeutic paracentesis by IR with 3350mL removed, which helped improve patient's symptoms. EGD was then performed, notable for .erosive esophagitis continue protonix follow up with GI for biopsy result     Patient is being followed by Nephrology for ESRD, received hemodialysis 12/22. At this time patient is with overall improvement and is medically stable for discharge.        Discharge planning time: 35 minutes

## 2021-12-23 NOTE — CHART NOTE - NSCHARTNOTEFT_GEN_A_CORE
PA called by RN to report pt lost PIV access. Pt is not due for any IV medications overnight. Two attempts made to obtain IV access with ultrasound guidance without success. Pt is refusing additional attempts at this time. Left pt in bed calm and in no acute distress. Tolerated PIV attempts well. No blood loss. RN instructed to continue to monitor pt and notify provider of any changes in pt status.

## 2021-12-23 NOTE — PROGRESS NOTE ADULT - ASSESSMENT
The patient is a 61 year old female with a past medical history of  ESRD (MWF), HTN, OA, hypothyroid, A. Fib, Hx of GI Bleed, pericardial effusion and recent abdominal surgery presents with complaints of abdominal pain associated with nausea and vomiting. In the ER, CT abdomen and pelvis with moderate ascites and cirrhosis. Status post IR paracentesis with 3350ml removed.     Assessment/Plan:    1. Abdominal pain secondary to ascites likely   improved   EGD today per GI   cont PPI     2- Cirrhosis with ascities , new diagnoses   unclear etiology ; probable VELA vs secondary to ESRD    Status post IR drainage of ascities   -- AFP WNL    3-ESRD on HD   cont per schedule  MWF    4. Hypertension , controlled   - Continue metoprolol, norvasc, hyrdralazine    5. Hypothyroidism  - On Synthroid    6/ Afib  - Not on AC for history of GIB    VTE- Heparin sq     Daughter  Keshia [phone]

## 2021-12-23 NOTE — PROGRESS NOTE ADULT - SUBJECTIVE AND OBJECTIVE BOX
Patient is a 61y old  Female who presents with a chief complaint of Abdominal pain with nausea and vomiting   seen at the bedside with GI team NP , speaks Romansh assisted with language interpretation   pt [place] any pain at present , + discomfort in epigastric area on palpation     chart reviewed   NPO for EGD today     ALLERGIES:  eggs (Anaphylaxis)  No Known Drug Allergies    MEDICATIONS  (STANDING):  amLODIPine   Tablet 10 milliGRAM(s) Oral daily  atorvastatin 40 milliGRAM(s) Oral at bedtime  calcium acetate 667 milliGRAM(s) Oral three times a day with meals  chlorhexidine 2% Cloths 1 Application(s) Topical <User Schedule>  hydrALAZINE 25 milliGRAM(s) Oral three times a day  levothyroxine 50 MICROGram(s) Oral daily  metoprolol succinate ER 50 milliGRAM(s) Oral daily  pantoprazole    Tablet 40 milliGRAM(s) Oral before breakfast    MEDICATIONS  (PRN):  acetaminophen     Tablet .. 650 milliGRAM(s) Oral every 6 hours PRN Temp greater or equal to 38C (100.4F), Mild Pain (1 - 3)  aluminum hydroxide/magnesium hydroxide/simethicone Suspension 30 milliLiter(s) Oral every 4 hours PRN Dyspepsia  melatonin 3 milliGRAM(s) Oral at bedtime PRN Insomnia  metoclopramide Injectable 10 milliGRAM(s) IV Push every 8 hours PRN Nausea or vomiting  ondansetron Injectable 4 milliGRAM(s) IV Push every 8 hours PRN Nausea and/or Vomiting    Vital Signs Last 24 Hrs  T(F): 98.5 (23 Dec 2021 04:38), Max: 99.5 (22 Dec 2021 22:09)  HR: 74 (22 Dec 2021 22:42) (68 - 76)  BP: 109/72 (23 Dec 2021 04:38) (109/72 - 172/84)  RR: 18 (23 Dec 2021 04:38) (16 - 18)  SpO2: 95% (23 Dec 2021 04:38) (90% - 100%)  I&O's Summary    22 Dec 2021 07:01  -  23 Dec 2021 07:00  --------------------------------------------------------  IN: 0 mL / OUT: 2000 mL / NET: -2000 mL        PHYSICAL EXAM:  General: awake alert ill looking pale   Neck: supple, no JVD   Lungs: CTA bilateral , no wheezing   Cardio: RRR, S1/S2, No murmur  Abdomen: Soft, Nontender, Nondistended; Bowel sounds present  Extremities: No calf tenderness, No pitting edema  Skin : warm pale color     LABS:                        9.6    6.22  )-----------( 320      ( 23 Dec 2021 07:12 )             30.1     12-23    132  |  92  |  21.0  ----------------------------<  210  4.7   |  26.0  |  3.16    Ca    9.1      23 Dec 2021 07:12  Phos  6.0     12-22    TPro  6.4  /  Alb  x   /  TBili  x   /  DBili  x   /  AST  x   /  ALT  x   /  AlkPhos  x   12-22      Lipase, Serum: 20 U/L (12-20-21 @ 22:18)    eGFR if Non African American: 15 mL/min/1.73M2 (12-23-21 @ 07:12)  eGFR if African American: 18 mL/min/1.73M2 (12-23-21 @ 07:12)    PT/INR - ( 23 Dec 2021 07:12 )   PT: 14.1 sec;   INR: 1.23 ratio                       04:45 - VBG - pH:       | pCO2:       | pO2:       | Lactate: 0.90   22:16 - VBG - pH:       | pCO2:       | pO2:       | Lactate: 1.10                     Culture - Fungal, Body Fluid (collected 21 Dec 2021 13:08)  Source: .Body Fluid Peritoneal Fluid  Preliminary Report (22 Dec 2021 08:04):    Testing in progress    Culture - Body Fluid with Gram Stain (collected 21 Dec 2021 13:08)  Source: .Body Fluid Peritoneal Fluid  Gram Stain (21 Dec 2021 15:00):    polymorphonuclear leukocytes    No organisms seen    by cytocentrifuge  Preliminary Report (22 Dec 2021 09:45):    No growth    Culture - Blood (collected 21 Dec 2021 04:46)  Source: .Blood Blood  Preliminary Report (23 Dec 2021 05:01):    No growth at 48 hours    Culture - Blood (collected 21 Dec 2021 04:46)  Source: .Blood Blood  Preliminary Report (23 Dec 2021 05:01):    No growth at 48 hours        RADIOLOGY & ADDITIONAL TESTS:

## 2021-12-23 NOTE — DISCHARGE NOTE NURSING/CASE MANAGEMENT/SOCIAL WORK - NSDCPEFALRISK_GEN_ALL_CORE
For information on Fall & Injury Prevention, visit: [email or web address] OR  [email or web address] OR  https://www.cdc.gov/steadi/patient.html

## 2021-12-23 NOTE — DISCHARGE NOTE PROVIDER - NSDCMRMEDTOKEN_GEN_ALL_CORE_FT
alendronate 70 mg oral tablet: 1 tab(s) orally once a week  amLODIPine 10 mg oral tablet: 1 tab(s) orally once a day  aspirin 81 mg oral delayed release tablet: 1 tab(s) orally once a day  atorvastatin 40 mg oral tablet: 1 tab(s) orally once a day  hydrALAZINE 25 mg oral tablet: 1 tab(s) orally 3 times a day  levothyroxine 50 mcg (0.05 mg) oral tablet: 1 tab(s) orally once a day  metoprolol succinate 50 mg oral tablet, extended release: 1 tab(s) orally once a day   amLODIPine 10 mg oral tablet: 1 tab(s) orally once a day  aspirin 81 mg oral delayed release tablet: 1 tab(s) orally once a day  atorvastatin 40 mg oral tablet: 1 tab(s) orally once a day  calcium acetate 667 mg oral tablet: 1 tab(s) orally 3 times a day   hydrALAZINE 25 mg oral tablet: 1 tab(s) orally 3 times a day  levothyroxine 50 mcg (0.05 mg) oral tablet: 1 tab(s) orally once a day  melatonin 3 mg oral tablet: 1 tab(s) orally once a day (at bedtime), As needed, Insomnia  metoclopramide 5 mg oral tablet: 1 tab(s) orally 3 times a day (before meals) as needed fir nausea   metoprolol succinate 50 mg oral tablet, extended release: 1 tab(s) orally once a day  Protonix 40 mg oral delayed release tablet: 1 tab(s) orally once a day (before a meal)  sucralfate 1 g oral tablet: 1 tab(s) orally 3 times a day (before meals)

## 2021-12-23 NOTE — DISCHARGE NOTE PROVIDER - PROVIDER TOKENS
PROVIDER:[TOKEN:[[id number]]],PROVIDER:[TOKEN:[[id number]]],FREE:[LAST:[Primary Care Provider],PHONE:[(   )    -],FAX:[(   )    -]]

## 2021-12-23 NOTE — PROGRESS NOTE ADULT - SUBJECTIVE AND OBJECTIVE BOX
NEPHROLOGY INTERVAL HPI/OVERNIGHT EVENTS:    Examined  No distress    MEDICATIONS  (STANDING):  amLODIPine   Tablet 10 milliGRAM(s) Oral daily  atorvastatin 40 milliGRAM(s) Oral at bedtime  calcium acetate 667 milliGRAM(s) Oral three times a day with meals  chlorhexidine 2% Cloths 1 Application(s) Topical <User Schedule>  hydrALAZINE 25 milliGRAM(s) Oral three times a day  levothyroxine 50 MICROGram(s) Oral daily  metoprolol succinate ER 50 milliGRAM(s) Oral daily  pantoprazole    Tablet 40 milliGRAM(s) Oral before breakfast    MEDICATIONS  (PRN):  acetaminophen     Tablet .. 650 milliGRAM(s) Oral every 6 hours PRN Temp greater or equal to 38C (100.4F), Mild Pain (1 - 3)  aluminum hydroxide/magnesium hydroxide/simethicone Suspension 30 milliLiter(s) Oral every 4 hours PRN Dyspepsia  melatonin 3 milliGRAM(s) Oral at bedtime PRN Insomnia  metoclopramide Injectable 10 milliGRAM(s) IV Push every 8 hours PRN Nausea or vomiting  ondansetron Injectable 4 milliGRAM(s) IV Push every 8 hours PRN Nausea and/or Vomiting      Allergies    eggs (Anaphylaxis)  No Known Drug Allergies    Intolerances        Vital Signs Last 24 Hrs  T(C): 37 (23 Dec 2021 10:03), Max: 37.5 (22 Dec 2021 22:09)  T(F): 98.6 (23 Dec 2021 10:03), Max: 99.5 (22 Dec 2021 22:09)  HR: 75 (23 Dec 2021 10:03) (70 - 76)  BP: 152/74 (23 Dec 2021 10:03) (109/72 - 172/84)  BP(mean): --  RR: 18 (23 Dec 2021 10:03) (16 - 18)  SpO2: 95% (23 Dec 2021 10:03) (90% - 95%)  Daily     Daily     PHYSICAL EXAM:  NECK: Supple, No JVD  NERVOUS SYSTEM:  Alert & Oriented X3,   CHEST/LUNG: Clear bilaterally  HEART: No rub  ABDOMEN: Soft, Nontender, No rigidity or rebound   EXTREMITIES:  2+ Peripheral Pulses, No LE edema    LABS:                        9.6    6.22  )-----------( 320      ( 23 Dec 2021 07:12 )             30.1     12-23    132<L>  |  92<L>  |  21.0<H>  ----------------------------<  210<H>  4.7   |  26.0  |  3.16<H>    Ca    9.1      23 Dec 2021 07:12  Phos  6.0     12-22    TPro  6.4  /  Alb  x   /  TBili  x   /  DBili  x   /  AST  x   /  ALT  x   /  AlkPhos  x   12-22    PT/INR - ( 23 Dec 2021 07:12 )   PT: 14.1 sec;   INR: 1.23 ratio                     RADIOLOGY & ADDITIONAL TESTS:

## 2021-12-23 NOTE — PROVIDER CONTACT NOTE (MEDICATION) - SITUATION
Pt going for endoscopy in AM. Order for NPO after midnight but does not specify with or without meds.

## 2021-12-23 NOTE — PROGRESS NOTE ADULT - ASSESSMENT
ESRD - HD tomorrow on MWF schedule    Anemia - Hgb stable     Improved N/V   cirrhosis with chronic ascites  No acute changes on CT abdomen   Lipase normal   GI plans noted    RO - cont phoslo TID w meals    Will follow

## 2022-01-01 ENCOUNTER — NON-APPOINTMENT (OUTPATIENT)
Age: 62
End: 2022-01-01

## 2022-01-01 ENCOUNTER — APPOINTMENT (OUTPATIENT)
Dept: INTERVENTIONAL RADIOLOGY/VASCULAR | Facility: CLINIC | Age: 62
End: 2022-01-01
Payer: MEDICARE

## 2022-01-01 ENCOUNTER — APPOINTMENT (OUTPATIENT)
Dept: CT IMAGING | Facility: CLINIC | Age: 62
End: 2022-01-01

## 2022-01-01 ENCOUNTER — OUTPATIENT (OUTPATIENT)
Dept: OUTPATIENT SERVICES | Facility: HOSPITAL | Age: 62
LOS: 1 days | End: 2022-01-01

## 2022-01-01 ENCOUNTER — APPOINTMENT (OUTPATIENT)
Dept: GASTROENTEROLOGY | Facility: CLINIC | Age: 62
End: 2022-01-01

## 2022-01-01 ENCOUNTER — TRANSCRIPTION ENCOUNTER (OUTPATIENT)
Age: 62
End: 2022-01-01

## 2022-01-01 ENCOUNTER — RESULT REVIEW (OUTPATIENT)
Age: 62
End: 2022-01-01

## 2022-01-01 ENCOUNTER — APPOINTMENT (OUTPATIENT)
Dept: GASTROENTEROLOGY | Facility: CLINIC | Age: 62
End: 2022-01-01
Payer: MEDICARE

## 2022-01-01 ENCOUNTER — APPOINTMENT (OUTPATIENT)
Dept: INTERVENTIONAL RADIOLOGY/VASCULAR | Facility: CLINIC | Age: 62
End: 2022-01-01

## 2022-01-01 ENCOUNTER — INPATIENT (INPATIENT)
Facility: HOSPITAL | Age: 62
LOS: 3 days | Discharge: HOME | DRG: 291 | End: 2022-05-25
Attending: HOSPITALIST | Admitting: HOSPITALIST
Payer: MEDICARE

## 2022-01-01 ENCOUNTER — APPOINTMENT (OUTPATIENT)
Dept: ULTRASOUND IMAGING | Facility: CLINIC | Age: 62
End: 2022-01-01

## 2022-01-01 ENCOUNTER — INPATIENT (INPATIENT)
Facility: HOSPITAL | Age: 62
LOS: 2 days | Discharge: HOME | DRG: 871 | End: 2022-08-01
Attending: STUDENT IN AN ORGANIZED HEALTH CARE EDUCATION/TRAINING PROGRAM | Admitting: STUDENT IN AN ORGANIZED HEALTH CARE EDUCATION/TRAINING PROGRAM
Payer: MEDICARE

## 2022-01-01 ENCOUNTER — LABORATORY RESULT (OUTPATIENT)
Age: 62
End: 2022-01-01

## 2022-01-01 ENCOUNTER — EMERGENCY (EMERGENCY)
Facility: HOSPITAL | Age: 62
LOS: 1 days | Discharge: HOME | End: 2022-01-01
Attending: EMERGENCY MEDICINE

## 2022-01-01 ENCOUNTER — OUTPATIENT (OUTPATIENT)
Dept: OUTPATIENT SERVICES | Facility: HOSPITAL | Age: 62
LOS: 1 days | End: 2022-01-01
Payer: MEDICARE

## 2022-01-01 ENCOUNTER — INPATIENT (INPATIENT)
Facility: HOSPITAL | Age: 62
LOS: 11 days | DRG: 23 | End: 2022-09-16
Attending: GENERAL ACUTE CARE HOSPITAL | Admitting: GENERAL ACUTE CARE HOSPITAL
Payer: MEDICARE

## 2022-01-01 ENCOUNTER — EMERGENCY (EMERGENCY)
Facility: HOSPITAL | Age: 62
LOS: 1 days | Discharge: HOME | End: 2022-01-01
Attending: EMERGENCY MEDICINE
Payer: MEDICARE

## 2022-01-01 ENCOUNTER — INPATIENT (INPATIENT)
Facility: HOSPITAL | Age: 62
LOS: 2 days | Discharge: HOME | DRG: 919 | End: 2022-05-13
Attending: HOSPITALIST | Admitting: INTERNAL MEDICINE
Payer: MEDICARE

## 2022-01-01 VITALS
DIASTOLIC BLOOD PRESSURE: 88 MMHG | SYSTOLIC BLOOD PRESSURE: 179 MMHG | RESPIRATION RATE: 20 BRPM | HEART RATE: 86 BPM | OXYGEN SATURATION: 99 % | TEMPERATURE: 98.1 F

## 2022-01-01 VITALS
RESPIRATION RATE: 14 BRPM | OXYGEN SATURATION: 98 % | HEART RATE: 88 BPM | DIASTOLIC BLOOD PRESSURE: 88 MMHG | WEIGHT: 140 LBS | SYSTOLIC BLOOD PRESSURE: 160 MMHG | TEMPERATURE: 97.7 F | BODY MASS INDEX: 26.43 KG/M2 | HEIGHT: 61 IN

## 2022-01-01 VITALS
RESPIRATION RATE: 18 BRPM | SYSTOLIC BLOOD PRESSURE: 106 MMHG | HEIGHT: 63 IN | HEART RATE: 93 BPM | DIASTOLIC BLOOD PRESSURE: 67 MMHG | TEMPERATURE: 98 F | WEIGHT: 154.98 LBS | OXYGEN SATURATION: 96 %

## 2022-01-01 VITALS
DIASTOLIC BLOOD PRESSURE: 77 MMHG | RESPIRATION RATE: 20 BRPM | HEART RATE: 90 BPM | SYSTOLIC BLOOD PRESSURE: 165 MMHG | OXYGEN SATURATION: 94 % | TEMPERATURE: 98 F

## 2022-01-01 VITALS
SYSTOLIC BLOOD PRESSURE: 180 MMHG | HEART RATE: 79 BPM | OXYGEN SATURATION: 95 % | WEIGHT: 149.91 LBS | DIASTOLIC BLOOD PRESSURE: 89 MMHG | TEMPERATURE: 98 F | HEIGHT: 63 IN | RESPIRATION RATE: 18 BRPM

## 2022-01-01 VITALS
BODY MASS INDEX: 27.56 KG/M2 | SYSTOLIC BLOOD PRESSURE: 150 MMHG | DIASTOLIC BLOOD PRESSURE: 84 MMHG | TEMPERATURE: 98.6 F | OXYGEN SATURATION: 94 % | WEIGHT: 146 LBS | HEART RATE: 71 BPM | RESPIRATION RATE: 16 BRPM | HEIGHT: 61 IN

## 2022-01-01 VITALS
RESPIRATION RATE: 16 BRPM | TEMPERATURE: 98.1 F | SYSTOLIC BLOOD PRESSURE: 179 MMHG | OXYGEN SATURATION: 94 % | DIASTOLIC BLOOD PRESSURE: 92 MMHG | HEART RATE: 86 BPM

## 2022-01-01 VITALS
HEART RATE: 142 BPM | RESPIRATION RATE: 17 BRPM | TEMPERATURE: 99 F | DIASTOLIC BLOOD PRESSURE: 70 MMHG | OXYGEN SATURATION: 99 % | HEIGHT: 63 IN | SYSTOLIC BLOOD PRESSURE: 119 MMHG

## 2022-01-01 VITALS
HEART RATE: 65 BPM | TEMPERATURE: 99 F | DIASTOLIC BLOOD PRESSURE: 67 MMHG | RESPIRATION RATE: 18 BRPM | SYSTOLIC BLOOD PRESSURE: 153 MMHG | OXYGEN SATURATION: 100 %

## 2022-01-01 VITALS
TEMPERATURE: 89 F | DIASTOLIC BLOOD PRESSURE: 81 MMHG | OXYGEN SATURATION: 98 % | HEART RATE: 67 BPM | SYSTOLIC BLOOD PRESSURE: 148 MMHG | RESPIRATION RATE: 16 BRPM

## 2022-01-01 VITALS
HEART RATE: 71 BPM | DIASTOLIC BLOOD PRESSURE: 83 MMHG | RESPIRATION RATE: 14 BRPM | BODY MASS INDEX: 27.94 KG/M2 | OXYGEN SATURATION: 98 % | HEIGHT: 61 IN | SYSTOLIC BLOOD PRESSURE: 156 MMHG | WEIGHT: 148 LBS

## 2022-01-01 VITALS
SYSTOLIC BLOOD PRESSURE: 174 MMHG | RESPIRATION RATE: 18 BRPM | DIASTOLIC BLOOD PRESSURE: 93 MMHG | HEART RATE: 87 BPM | OXYGEN SATURATION: 97 % | TEMPERATURE: 97.8 F

## 2022-01-01 VITALS
SYSTOLIC BLOOD PRESSURE: 155 MMHG | TEMPERATURE: 98 F | DIASTOLIC BLOOD PRESSURE: 73 MMHG | HEART RATE: 71 BPM | OXYGEN SATURATION: 98 % | RESPIRATION RATE: 18 BRPM

## 2022-01-01 VITALS
DIASTOLIC BLOOD PRESSURE: 91 MMHG | SYSTOLIC BLOOD PRESSURE: 180 MMHG | RESPIRATION RATE: 16 BRPM | OXYGEN SATURATION: 95 % | HEART RATE: 89 BPM | TEMPERATURE: 98.2 F

## 2022-01-01 VITALS
DIASTOLIC BLOOD PRESSURE: 100 MMHG | HEART RATE: 86 BPM | SYSTOLIC BLOOD PRESSURE: 180 MMHG | RESPIRATION RATE: 18 BRPM | OXYGEN SATURATION: 96 % | TEMPERATURE: 97.7 F

## 2022-01-01 VITALS
RESPIRATION RATE: 18 BRPM | TEMPERATURE: 98.5 F | DIASTOLIC BLOOD PRESSURE: 97 MMHG | SYSTOLIC BLOOD PRESSURE: 186 MMHG | HEART RATE: 80 BPM | OXYGEN SATURATION: 97 %

## 2022-01-01 VITALS
SYSTOLIC BLOOD PRESSURE: 185 MMHG | TEMPERATURE: 97.4 F | RESPIRATION RATE: 16 BRPM | DIASTOLIC BLOOD PRESSURE: 89 MMHG | HEART RATE: 76 BPM | OXYGEN SATURATION: 97 %

## 2022-01-01 VITALS
SYSTOLIC BLOOD PRESSURE: 165 MMHG | TEMPERATURE: 98 F | HEIGHT: 63 IN | HEART RATE: 75 BPM | OXYGEN SATURATION: 98 % | DIASTOLIC BLOOD PRESSURE: 101 MMHG | RESPIRATION RATE: 19 BRPM | WEIGHT: 139.99 LBS

## 2022-01-01 VITALS
HEART RATE: 81 BPM | DIASTOLIC BLOOD PRESSURE: 99 MMHG | OXYGEN SATURATION: 93 % | RESPIRATION RATE: 21 BRPM | TEMPERATURE: 97.7 F | SYSTOLIC BLOOD PRESSURE: 186 MMHG

## 2022-01-01 VITALS
SYSTOLIC BLOOD PRESSURE: 192 MMHG | RESPIRATION RATE: 20 BRPM | OXYGEN SATURATION: 100 % | DIASTOLIC BLOOD PRESSURE: 91 MMHG | HEART RATE: 93 BPM | HEIGHT: 63 IN | TEMPERATURE: 101 F

## 2022-01-01 VITALS
HEART RATE: 80 BPM | WEIGHT: 140 LBS | TEMPERATURE: 98 F | BODY MASS INDEX: 26.43 KG/M2 | OXYGEN SATURATION: 99 % | RESPIRATION RATE: 16 BRPM | SYSTOLIC BLOOD PRESSURE: 125 MMHG | HEIGHT: 61 IN | DIASTOLIC BLOOD PRESSURE: 80 MMHG

## 2022-01-01 VITALS
SYSTOLIC BLOOD PRESSURE: 180 MMHG | RESPIRATION RATE: 18 BRPM | DIASTOLIC BLOOD PRESSURE: 90 MMHG | OXYGEN SATURATION: 94 % | HEART RATE: 78 BPM | TEMPERATURE: 98.3 F

## 2022-01-01 VITALS
HEIGHT: 63 IN | TEMPERATURE: 101 F | HEART RATE: 92 BPM | SYSTOLIC BLOOD PRESSURE: 200 MMHG | WEIGHT: 154.98 LBS | RESPIRATION RATE: 16 BRPM | OXYGEN SATURATION: 99 % | DIASTOLIC BLOOD PRESSURE: 106 MMHG

## 2022-01-01 VITALS
OXYGEN SATURATION: 96 % | HEART RATE: 85 BPM | DIASTOLIC BLOOD PRESSURE: 104 MMHG | TEMPERATURE: 98 F | SYSTOLIC BLOOD PRESSURE: 187 MMHG | RESPIRATION RATE: 20 BRPM

## 2022-01-01 VITALS
TEMPERATURE: 98.2 F | HEART RATE: 80 BPM | DIASTOLIC BLOOD PRESSURE: 93 MMHG | OXYGEN SATURATION: 96 % | RESPIRATION RATE: 18 BRPM | SYSTOLIC BLOOD PRESSURE: 169 MMHG

## 2022-01-01 VITALS
OXYGEN SATURATION: 99 % | HEART RATE: 84 BPM | RESPIRATION RATE: 16 BRPM | DIASTOLIC BLOOD PRESSURE: 90 MMHG | TEMPERATURE: 98 F | SYSTOLIC BLOOD PRESSURE: 165 MMHG

## 2022-01-01 VITALS
DIASTOLIC BLOOD PRESSURE: 86 MMHG | RESPIRATION RATE: 24 BRPM | HEART RATE: 84 BPM | SYSTOLIC BLOOD PRESSURE: 176 MMHG | OXYGEN SATURATION: 99 %

## 2022-01-01 VITALS
SYSTOLIC BLOOD PRESSURE: 171 MMHG | DIASTOLIC BLOOD PRESSURE: 89 MMHG | RESPIRATION RATE: 18 BRPM | OXYGEN SATURATION: 97 % | TEMPERATURE: 97.7 F | HEART RATE: 83 BPM

## 2022-01-01 VITALS
HEART RATE: 73 BPM | OXYGEN SATURATION: 92 % | DIASTOLIC BLOOD PRESSURE: 85 MMHG | SYSTOLIC BLOOD PRESSURE: 179 MMHG | RESPIRATION RATE: 16 BRPM | TEMPERATURE: 98.3 F

## 2022-01-01 VITALS
HEART RATE: 85 BPM | TEMPERATURE: 98 F | RESPIRATION RATE: 18 BRPM | SYSTOLIC BLOOD PRESSURE: 182 MMHG | DIASTOLIC BLOOD PRESSURE: 96 MMHG | OXYGEN SATURATION: 97 %

## 2022-01-01 VITALS
SYSTOLIC BLOOD PRESSURE: 157 MMHG | OXYGEN SATURATION: 93 % | HEART RATE: 66 BPM | RESPIRATION RATE: 18 BRPM | TEMPERATURE: 98 F | DIASTOLIC BLOOD PRESSURE: 73 MMHG

## 2022-01-01 VITALS — OXYGEN SATURATION: 54 %

## 2022-01-01 DIAGNOSIS — Z49.01 ENCOUNTER FOR FITTING AND ADJUSTMENT OF EXTRACORPOREAL DIALYSIS CATHETER: Chronic | ICD-10-CM

## 2022-01-01 DIAGNOSIS — N18.9 CHRONIC KIDNEY DISEASE, UNSPECIFIED: ICD-10-CM

## 2022-01-01 DIAGNOSIS — I10 ESSENTIAL (PRIMARY) HYPERTENSION: ICD-10-CM

## 2022-01-01 DIAGNOSIS — I61.5 NONTRAUMATIC INTRACEREBRAL HEMORRHAGE, INTRAVENTRICULAR: ICD-10-CM

## 2022-01-01 DIAGNOSIS — I77.0 ARTERIOVENOUS FISTULA, ACQUIRED: Chronic | ICD-10-CM

## 2022-01-01 DIAGNOSIS — M10.9 GOUT, UNSPECIFIED: ICD-10-CM

## 2022-01-01 DIAGNOSIS — Z00.8 ENCOUNTER FOR OTHER GENERAL EXAMINATION: ICD-10-CM

## 2022-01-01 DIAGNOSIS — E79.0 HYPERURICEMIA W/OUT SIGNS OF INFLAMMATORY ARTHRITIS AND TOPHACEOUS DISEASE: ICD-10-CM

## 2022-01-01 DIAGNOSIS — R18.8 UNSPECIFIED CIRRHOSIS OF LIVER: ICD-10-CM

## 2022-01-01 DIAGNOSIS — K74.60 UNSPECIFIED CIRRHOSIS OF LIVER: ICD-10-CM

## 2022-01-01 DIAGNOSIS — R18.8 OTHER ASCITES: ICD-10-CM

## 2022-01-01 DIAGNOSIS — Z99.2 END STAGE RENAL DISEASE: ICD-10-CM

## 2022-01-01 DIAGNOSIS — E11.9 TYPE 2 DIABETES MELLITUS W/OUT COMPLICATIONS: ICD-10-CM

## 2022-01-01 DIAGNOSIS — Z87.39 PERSONAL HISTORY OF OTHER DISEASES OF THE MUSCULOSKELETAL SYSTEM AND CONNECTIVE TISSUE: ICD-10-CM

## 2022-01-01 DIAGNOSIS — Z09 ENCOUNTER FOR FOLLOW-UP EXAMINATION AFTER COMPLETED TREATMENT FOR CONDITIONS OTHER THAN MALIGNANT NEOPLASM: ICD-10-CM

## 2022-01-01 DIAGNOSIS — R79.89 OTHER SPECIFIED ABNORMAL FINDINGS OF BLOOD CHEMISTRY: ICD-10-CM

## 2022-01-01 DIAGNOSIS — Z86.010 PERSONAL HISTORY OF COLONIC POLYPS: ICD-10-CM

## 2022-01-01 DIAGNOSIS — J81.1 CHRONIC PULMONARY EDEMA: ICD-10-CM

## 2022-01-01 DIAGNOSIS — R76.8 OTHER SPECIFIED ABNORMAL IMMUNOLOGICAL FINDINGS IN SERUM: ICD-10-CM

## 2022-01-01 DIAGNOSIS — N18.6 END STAGE RENAL DISEASE: ICD-10-CM

## 2022-01-01 DIAGNOSIS — Z00.00 ENCOUNTER FOR GENERAL ADULT MEDICAL EXAMINATION WITHOUT ABNORMAL FINDINGS: ICD-10-CM

## 2022-01-01 DIAGNOSIS — Z86.19 PERSONAL HISTORY OF OTHER INFECTIOUS AND PARASITIC DISEASES: ICD-10-CM

## 2022-01-01 DIAGNOSIS — I16.9 HYPERTENSIVE CRISIS, UNSPECIFIED: ICD-10-CM

## 2022-01-01 DIAGNOSIS — E87.70 FLUID OVERLOAD, UNSPECIFIED: ICD-10-CM

## 2022-01-01 LAB
-  BLOOD PCR PANEL: SIGNIFICANT CHANGE UP
A1C WITH ESTIMATED AVERAGE GLUCOSE RESULT: 10.6 % — HIGH (ref 4–5.6)
A1C WITH ESTIMATED AVERAGE GLUCOSE RESULT: 7.8 % — HIGH (ref 4–5.6)
A1C WITH ESTIMATED AVERAGE GLUCOSE RESULT: 8.1 % — HIGH (ref 4–5.6)
A1C WITH ESTIMATED AVERAGE GLUCOSE RESULT: 9.5 % — HIGH (ref 4–5.6)
ACETONE SERPL-MCNC: NEGATIVE — SIGNIFICANT CHANGE UP
AFP-TM SERPL-MCNC: <1.8 NG/ML — SIGNIFICANT CHANGE UP
ALBUMIN FLD-MCNC: 1.4 G/DL — SIGNIFICANT CHANGE UP
ALBUMIN SERPL ELPH-MCNC: 2.4 G/DL — LOW (ref 3.3–5.2)
ALBUMIN SERPL ELPH-MCNC: 2.4 G/DL — LOW (ref 3.3–5.2)
ALBUMIN SERPL ELPH-MCNC: 2.5 G/DL — LOW (ref 3.3–5.2)
ALBUMIN SERPL ELPH-MCNC: 2.5 G/DL — LOW (ref 3.3–5.2)
ALBUMIN SERPL ELPH-MCNC: 2.6 G/DL — LOW (ref 3.3–5.2)
ALBUMIN SERPL ELPH-MCNC: 2.6 G/DL — LOW (ref 3.3–5.2)
ALBUMIN SERPL ELPH-MCNC: 2.8 G/DL — LOW (ref 3.3–5.2)
ALBUMIN SERPL ELPH-MCNC: 2.9 G/DL — LOW (ref 3.3–5.2)
ALBUMIN SERPL ELPH-MCNC: 3 G/DL — LOW (ref 3.3–5.2)
ALBUMIN SERPL ELPH-MCNC: 3.2 G/DL — LOW (ref 3.3–5.2)
ALBUMIN SERPL ELPH-MCNC: 3.3 G/DL — SIGNIFICANT CHANGE UP (ref 3.3–5.2)
ALBUMIN SERPL ELPH-MCNC: 3.4 G/DL — SIGNIFICANT CHANGE UP (ref 3.3–5.2)
ALBUMIN SERPL ELPH-MCNC: 3.5 G/DL — SIGNIFICANT CHANGE UP (ref 3.3–5.2)
ALBUMIN SERPL ELPH-MCNC: 4.1 G/DL
ALP BLD-CCNC: 127 U/L
ALP SERPL-CCNC: 133 U/L — HIGH (ref 40–120)
ALP SERPL-CCNC: 323 U/L — HIGH (ref 40–120)
ALP SERPL-CCNC: 333 U/L — HIGH (ref 40–120)
ALP SERPL-CCNC: 333 U/L — HIGH (ref 40–120)
ALP SERPL-CCNC: 380 U/L — HIGH (ref 40–120)
ALP SERPL-CCNC: 441 U/L — HIGH (ref 40–120)
ALP SERPL-CCNC: 58 U/L — SIGNIFICANT CHANGE UP (ref 40–120)
ALP SERPL-CCNC: 591 U/L — HIGH (ref 40–120)
ALP SERPL-CCNC: 61 U/L — SIGNIFICANT CHANGE UP (ref 40–120)
ALP SERPL-CCNC: 66 U/L — SIGNIFICANT CHANGE UP (ref 40–120)
ALP SERPL-CCNC: 68 U/L — SIGNIFICANT CHANGE UP (ref 40–120)
ALP SERPL-CCNC: 68 U/L — SIGNIFICANT CHANGE UP (ref 40–120)
ALT FLD-CCNC: 11 U/L — SIGNIFICANT CHANGE UP
ALT FLD-CCNC: 16 U/L — SIGNIFICANT CHANGE UP
ALT FLD-CCNC: 17 U/L — SIGNIFICANT CHANGE UP
ALT FLD-CCNC: 18 U/L — SIGNIFICANT CHANGE UP
ALT FLD-CCNC: 21 U/L — SIGNIFICANT CHANGE UP
ALT FLD-CCNC: 30 U/L — SIGNIFICANT CHANGE UP
ALT FLD-CCNC: 6 U/L — SIGNIFICANT CHANGE UP
ALT FLD-CCNC: 6 U/L — SIGNIFICANT CHANGE UP
ALT FLD-CCNC: 7 U/L — SIGNIFICANT CHANGE UP
ALT FLD-CCNC: 8 U/L — SIGNIFICANT CHANGE UP
ALT SERPL-CCNC: 8 U/L
AMMONIA BLD-MCNC: 20 UMOL/L — SIGNIFICANT CHANGE UP (ref 11–55)
AMMONIA BLD-MCNC: <10 UMOL/L — LOW (ref 11–55)
AMYLASE P1 CFR SERPL: 40 U/L — SIGNIFICANT CHANGE UP (ref 36–128)
ANA PAT FLD IF-IMP: ABNORMAL
ANA SER IF-ACNC: ABNORMAL
ANION GAP SERPL CALC-SCNC: 11 MMOL/L — SIGNIFICANT CHANGE UP (ref 5–17)
ANION GAP SERPL CALC-SCNC: 12 MMOL/L — SIGNIFICANT CHANGE UP (ref 5–17)
ANION GAP SERPL CALC-SCNC: 13 MMOL/L — SIGNIFICANT CHANGE UP (ref 5–17)
ANION GAP SERPL CALC-SCNC: 14 MMOL/L — SIGNIFICANT CHANGE UP (ref 5–17)
ANION GAP SERPL CALC-SCNC: 15 MMOL/L — SIGNIFICANT CHANGE UP (ref 5–17)
ANION GAP SERPL CALC-SCNC: 16 MMOL/L
ANION GAP SERPL CALC-SCNC: 16 MMOL/L — SIGNIFICANT CHANGE UP (ref 5–17)
ANION GAP SERPL CALC-SCNC: 17 MMOL/L — SIGNIFICANT CHANGE UP (ref 5–17)
ANION GAP SERPL CALC-SCNC: 17 MMOL/L — SIGNIFICANT CHANGE UP (ref 5–17)
APTT BLD: 28.1 SEC — SIGNIFICANT CHANGE UP (ref 27.5–35.5)
APTT BLD: 29.4 SEC — SIGNIFICANT CHANGE UP (ref 27.5–35.5)
APTT BLD: 29.4 SEC — SIGNIFICANT CHANGE UP (ref 27.5–35.5)
APTT BLD: 31.3 SEC — SIGNIFICANT CHANGE UP (ref 27.5–35.5)
APTT BLD: 36.8 SEC — HIGH (ref 27.5–35.5)
AST SERPL-CCNC: 11 U/L — SIGNIFICANT CHANGE UP
AST SERPL-CCNC: 13 U/L — SIGNIFICANT CHANGE UP
AST SERPL-CCNC: 14 U/L — SIGNIFICANT CHANGE UP
AST SERPL-CCNC: 14 U/L — SIGNIFICANT CHANGE UP
AST SERPL-CCNC: 15 U/L — SIGNIFICANT CHANGE UP
AST SERPL-CCNC: 18 U/L
AST SERPL-CCNC: 18 U/L — SIGNIFICANT CHANGE UP
AST SERPL-CCNC: 21 U/L — SIGNIFICANT CHANGE UP
AST SERPL-CCNC: 31 U/L — SIGNIFICANT CHANGE UP
AST SERPL-CCNC: 35 U/L — HIGH
AST SERPL-CCNC: 42 U/L — HIGH
AST SERPL-CCNC: 46 U/L — HIGH
AST SERPL-CCNC: 55 U/L — HIGH
B PERT IGG+IGM PNL SER: ABNORMAL
BASE EXCESS BLDA CALC-SCNC: 4 MMOL/L — HIGH (ref -2–3)
BASE EXCESS BLDA CALC-SCNC: 6.1 MMOL/L — HIGH (ref -2–3)
BASE EXCESS BLDV CALC-SCNC: 10.1 MMOL/L — HIGH (ref -2–3)
BASE EXCESS BLDV CALC-SCNC: 4.5 MMOL/L — HIGH (ref -2–3)
BASE EXCESS BLDV CALC-SCNC: 8.6 MMOL/L — HIGH (ref -2–3)
BASOPHILS # BLD AUTO: 0 K/UL — SIGNIFICANT CHANGE UP (ref 0–0.2)
BASOPHILS # BLD AUTO: 0.01 K/UL
BASOPHILS # BLD AUTO: 0.03 K/UL
BASOPHILS # BLD AUTO: 0.03 K/UL — SIGNIFICANT CHANGE UP (ref 0–0.2)
BASOPHILS # BLD AUTO: 0.04 K/UL
BASOPHILS # BLD AUTO: 0.04 K/UL
BASOPHILS # BLD AUTO: 0.04 K/UL — SIGNIFICANT CHANGE UP (ref 0–0.2)
BASOPHILS # BLD AUTO: 0.04 K/UL — SIGNIFICANT CHANGE UP (ref 0–0.2)
BASOPHILS # BLD AUTO: 0.05 K/UL
BASOPHILS # BLD AUTO: 0.05 K/UL — SIGNIFICANT CHANGE UP (ref 0–0.2)
BASOPHILS # BLD AUTO: 0.06 K/UL — SIGNIFICANT CHANGE UP (ref 0–0.2)
BASOPHILS NFR BLD AUTO: 0 % — SIGNIFICANT CHANGE UP (ref 0–2)
BASOPHILS NFR BLD AUTO: 0.3 %
BASOPHILS NFR BLD AUTO: 0.6 % — SIGNIFICANT CHANGE UP (ref 0–2)
BASOPHILS NFR BLD AUTO: 0.7 %
BASOPHILS NFR BLD AUTO: 0.9 % — SIGNIFICANT CHANGE UP (ref 0–2)
BASOPHILS NFR BLD AUTO: 0.9 % — SIGNIFICANT CHANGE UP (ref 0–2)
BASOPHILS NFR BLD AUTO: 1 % — SIGNIFICANT CHANGE UP (ref 0–2)
BASOPHILS NFR BLD AUTO: 1.1 %
BASOPHILS NFR BLD AUTO: 1.2 %
BASOPHILS NFR BLD AUTO: 1.3 %
BASOPHILS NFR BLD AUTO: 1.5 % — SIGNIFICANT CHANGE UP (ref 0–2)
BILIRUB SERPL-MCNC: 0.3 MG/DL — LOW (ref 0.4–2)
BILIRUB SERPL-MCNC: 0.3 MG/DL — LOW (ref 0.4–2)
BILIRUB SERPL-MCNC: 0.4 MG/DL — SIGNIFICANT CHANGE UP (ref 0.4–2)
BILIRUB SERPL-MCNC: 0.5 MG/DL
BILIRUB SERPL-MCNC: 0.5 MG/DL — SIGNIFICANT CHANGE UP (ref 0.4–2)
BILIRUB SERPL-MCNC: 0.6 MG/DL — SIGNIFICANT CHANGE UP (ref 0.4–2)
BILIRUB SERPL-MCNC: 0.7 MG/DL — SIGNIFICANT CHANGE UP (ref 0.4–2)
BILIRUB SERPL-MCNC: 0.7 MG/DL — SIGNIFICANT CHANGE UP (ref 0.4–2)
BILIRUB SERPL-MCNC: 0.9 MG/DL — SIGNIFICANT CHANGE UP (ref 0.4–2)
BLD GP AB SCN SERPL QL: SIGNIFICANT CHANGE UP
BLD GP AB SCN SERPL QL: SIGNIFICANT CHANGE UP
BLOOD GAS COMMENTS ARTERIAL: SIGNIFICANT CHANGE UP
BLOOD GAS COMMENTS ARTERIAL: SIGNIFICANT CHANGE UP
BUN SERPL-MCNC: 12.3 MG/DL — SIGNIFICANT CHANGE UP (ref 8–20)
BUN SERPL-MCNC: 12.9 MG/DL — SIGNIFICANT CHANGE UP (ref 8–20)
BUN SERPL-MCNC: 13.1 MG/DL — SIGNIFICANT CHANGE UP (ref 8–20)
BUN SERPL-MCNC: 13.6 MG/DL — SIGNIFICANT CHANGE UP (ref 8–20)
BUN SERPL-MCNC: 14.6 MG/DL — SIGNIFICANT CHANGE UP (ref 8–20)
BUN SERPL-MCNC: 15.3 MG/DL — SIGNIFICANT CHANGE UP (ref 8–20)
BUN SERPL-MCNC: 18.2 MG/DL — SIGNIFICANT CHANGE UP (ref 8–20)
BUN SERPL-MCNC: 19.7 MG/DL — SIGNIFICANT CHANGE UP (ref 8–20)
BUN SERPL-MCNC: 19.7 MG/DL — SIGNIFICANT CHANGE UP (ref 8–20)
BUN SERPL-MCNC: 20 MG/DL — SIGNIFICANT CHANGE UP (ref 8–20)
BUN SERPL-MCNC: 24.7 MG/DL — HIGH (ref 8–20)
BUN SERPL-MCNC: 26.9 MG/DL — HIGH (ref 8–20)
BUN SERPL-MCNC: 27 MG/DL — HIGH (ref 8–20)
BUN SERPL-MCNC: 27.1 MG/DL — HIGH (ref 8–20)
BUN SERPL-MCNC: 28 MG/DL — HIGH (ref 8–20)
BUN SERPL-MCNC: 28 MG/DL — HIGH (ref 8–20)
BUN SERPL-MCNC: 30 MG/DL
BUN SERPL-MCNC: 31.9 MG/DL — HIGH (ref 8–20)
BUN SERPL-MCNC: 33.5 MG/DL — HIGH (ref 8–20)
BUN SERPL-MCNC: 35.2 MG/DL — HIGH (ref 8–20)
BUN SERPL-MCNC: 37.3 MG/DL — HIGH (ref 8–20)
BUN SERPL-MCNC: 38.9 MG/DL — HIGH (ref 8–20)
BUN SERPL-MCNC: 48.8 MG/DL — HIGH (ref 8–20)
BUN SERPL-MCNC: 50.7 MG/DL — HIGH (ref 8–20)
BUN SERPL-MCNC: 53.2 MG/DL — HIGH (ref 8–20)
BUN SERPL-MCNC: 75.3 MG/DL — HIGH (ref 8–20)
CA-I SERPL-SCNC: 1.09 MMOL/L — LOW (ref 1.15–1.33)
CA-I SERPL-SCNC: 1.12 MMOL/L — LOW (ref 1.15–1.33)
CA-I SERPL-SCNC: 1.19 MMOL/L — SIGNIFICANT CHANGE UP (ref 1.15–1.33)
CALCIUM SERPL-MCNC: 7.9 MG/DL — LOW (ref 8.6–10.2)
CALCIUM SERPL-MCNC: 8.1 MG/DL — LOW (ref 8.6–10.2)
CALCIUM SERPL-MCNC: 8.2 MG/DL — LOW (ref 8.6–10.2)
CALCIUM SERPL-MCNC: 8.3 MG/DL — LOW (ref 8.6–10.2)
CALCIUM SERPL-MCNC: 8.3 MG/DL — LOW (ref 8.6–10.2)
CALCIUM SERPL-MCNC: 8.4 MG/DL — LOW (ref 8.6–10.2)
CALCIUM SERPL-MCNC: 8.4 MG/DL — LOW (ref 8.6–10.2)
CALCIUM SERPL-MCNC: 8.6 MG/DL — SIGNIFICANT CHANGE UP (ref 8.4–10.5)
CALCIUM SERPL-MCNC: 8.7 MG/DL — SIGNIFICANT CHANGE UP (ref 8.4–10.5)
CALCIUM SERPL-MCNC: 8.8 MG/DL — SIGNIFICANT CHANGE UP (ref 8.4–10.5)
CALCIUM SERPL-MCNC: 8.9 MG/DL — SIGNIFICANT CHANGE UP (ref 8.4–10.5)
CALCIUM SERPL-MCNC: 8.9 MG/DL — SIGNIFICANT CHANGE UP (ref 8.6–10.2)
CALCIUM SERPL-MCNC: 8.9 MG/DL — SIGNIFICANT CHANGE UP (ref 8.6–10.2)
CALCIUM SERPL-MCNC: 9 MG/DL — SIGNIFICANT CHANGE UP (ref 8.4–10.5)
CALCIUM SERPL-MCNC: 9 MG/DL — SIGNIFICANT CHANGE UP (ref 8.6–10.2)
CALCIUM SERPL-MCNC: 9.1 MG/DL — SIGNIFICANT CHANGE UP (ref 8.4–10.5)
CALCIUM SERPL-MCNC: 9.3 MG/DL — SIGNIFICANT CHANGE UP (ref 8.4–10.5)
CALCIUM SERPL-MCNC: 9.4 MG/DL — SIGNIFICANT CHANGE UP (ref 8.4–10.5)
CALCIUM SERPL-MCNC: 9.5 MG/DL — SIGNIFICANT CHANGE UP (ref 8.4–10.5)
CALCIUM SERPL-MCNC: 9.6 MG/DL — SIGNIFICANT CHANGE UP (ref 8.4–10.5)
CALCIUM SERPL-MCNC: 9.7 MG/DL — SIGNIFICANT CHANGE UP (ref 8.4–10.5)
CALCIUM SERPL-MCNC: 9.7 MG/DL — SIGNIFICANT CHANGE UP (ref 8.4–10.5)
CALCIUM SERPL-MCNC: 9.8 MG/DL
CHLORIDE BLDV-SCNC: 92 MMOL/L — LOW (ref 98–107)
CHLORIDE BLDV-SCNC: 95 MMOL/L — LOW (ref 98–107)
CHLORIDE BLDV-SCNC: 98 MMOL/L — SIGNIFICANT CHANGE UP (ref 98–107)
CHLORIDE SERPL-SCNC: 86 MMOL/L — LOW (ref 98–107)
CHLORIDE SERPL-SCNC: 88 MMOL/L — LOW (ref 98–107)
CHLORIDE SERPL-SCNC: 90 MMOL/L — LOW (ref 98–107)
CHLORIDE SERPL-SCNC: 92 MMOL/L
CHLORIDE SERPL-SCNC: 92 MMOL/L — LOW (ref 98–107)
CHLORIDE SERPL-SCNC: 93 MMOL/L — LOW (ref 98–107)
CHLORIDE SERPL-SCNC: 94 MMOL/L — LOW (ref 98–107)
CHLORIDE SERPL-SCNC: 95 MMOL/L — LOW (ref 98–107)
CHLORIDE SERPL-SCNC: 96 MMOL/L — LOW (ref 98–107)
CHLORIDE SERPL-SCNC: 96 MMOL/L — LOW (ref 98–107)
CHLORIDE SERPL-SCNC: 98 MMOL/L — SIGNIFICANT CHANGE UP (ref 98–107)
CHOLEST SERPL-MCNC: 107 MG/DL — SIGNIFICANT CHANGE UP
CHOLEST SERPL-MCNC: 122 MG/DL — SIGNIFICANT CHANGE UP
CK MB CFR SERPL CALC: 4.7 NG/ML — SIGNIFICANT CHANGE UP (ref 0–6.7)
CK SERPL-CCNC: 175 U/L — HIGH (ref 25–170)
CO2 SERPL-SCNC: 24 MMOL/L — SIGNIFICANT CHANGE UP (ref 22–29)
CO2 SERPL-SCNC: 24 MMOL/L — SIGNIFICANT CHANGE UP (ref 22–29)
CO2 SERPL-SCNC: 25 MMOL/L
CO2 SERPL-SCNC: 25 MMOL/L — SIGNIFICANT CHANGE UP (ref 22–29)
CO2 SERPL-SCNC: 26 MMOL/L — SIGNIFICANT CHANGE UP (ref 22–29)
CO2 SERPL-SCNC: 27 MMOL/L — SIGNIFICANT CHANGE UP (ref 22–29)
CO2 SERPL-SCNC: 28 MMOL/L — SIGNIFICANT CHANGE UP (ref 22–29)
CO2 SERPL-SCNC: 29 MMOL/L — SIGNIFICANT CHANGE UP (ref 22–29)
CO2 SERPL-SCNC: 30 MMOL/L — HIGH (ref 22–29)
CO2 SERPL-SCNC: 31 MMOL/L — HIGH (ref 22–29)
COLOR FLD: YELLOW
CREAT SERPL-MCNC: 2.59 MG/DL — HIGH (ref 0.5–1.3)
CREAT SERPL-MCNC: 2.69 MG/DL — HIGH (ref 0.5–1.3)
CREAT SERPL-MCNC: 2.77 MG/DL — HIGH (ref 0.5–1.3)
CREAT SERPL-MCNC: 2.84 MG/DL — HIGH (ref 0.5–1.3)
CREAT SERPL-MCNC: 3.18 MG/DL — HIGH (ref 0.5–1.3)
CREAT SERPL-MCNC: 3.31 MG/DL — HIGH (ref 0.5–1.3)
CREAT SERPL-MCNC: 3.33 MG/DL — HIGH (ref 0.5–1.3)
CREAT SERPL-MCNC: 3.35 MG/DL — HIGH (ref 0.5–1.3)
CREAT SERPL-MCNC: 3.68 MG/DL — HIGH (ref 0.5–1.3)
CREAT SERPL-MCNC: 3.71 MG/DL — HIGH (ref 0.5–1.3)
CREAT SERPL-MCNC: 3.76 MG/DL — HIGH (ref 0.5–1.3)
CREAT SERPL-MCNC: 3.86 MG/DL
CREAT SERPL-MCNC: 3.88 MG/DL — HIGH (ref 0.5–1.3)
CREAT SERPL-MCNC: 3.99 MG/DL — HIGH (ref 0.5–1.3)
CREAT SERPL-MCNC: 4.06 MG/DL — HIGH (ref 0.5–1.3)
CREAT SERPL-MCNC: 4.08 MG/DL — HIGH (ref 0.5–1.3)
CREAT SERPL-MCNC: 4.17 MG/DL — HIGH (ref 0.5–1.3)
CREAT SERPL-MCNC: 4.21 MG/DL — HIGH (ref 0.5–1.3)
CREAT SERPL-MCNC: 4.66 MG/DL — HIGH (ref 0.5–1.3)
CREAT SERPL-MCNC: 4.73 MG/DL — HIGH (ref 0.5–1.3)
CREAT SERPL-MCNC: 4.74 MG/DL — HIGH (ref 0.5–1.3)
CREAT SERPL-MCNC: 4.93 MG/DL — HIGH (ref 0.5–1.3)
CREAT SERPL-MCNC: 5.04 MG/DL — HIGH (ref 0.5–1.3)
CREAT SERPL-MCNC: 5.16 MG/DL — HIGH (ref 0.5–1.3)
CREAT SERPL-MCNC: 5.37 MG/DL — HIGH (ref 0.5–1.3)
CREAT SERPL-MCNC: 5.91 MG/DL — HIGH (ref 0.5–1.3)
CULTURE RESULTS: SIGNIFICANT CHANGE UP
EGFR: 10 ML/MIN/1.73M2 — LOW
EGFR: 11 ML/MIN/1.73M2 — LOW
EGFR: 12 ML/MIN/1.73M2 — LOW
EGFR: 13 ML/MIN/1.73M2 — LOW
EGFR: 15 ML/MIN/1.73M2 — LOW
EGFR: 16 ML/MIN/1.73M2 — LOW
EGFR: 18 ML/MIN/1.73M2 — LOW
EGFR: 19 ML/MIN/1.73M2 — LOW
EGFR: 20 ML/MIN/1.73M2 — LOW
EGFR: 8 ML/MIN/1.73M2 — LOW
EGFR: 9 ML/MIN/1.73M2 — LOW
EOSINOPHIL # BLD AUTO: 0.06 K/UL — SIGNIFICANT CHANGE UP (ref 0–0.5)
EOSINOPHIL # BLD AUTO: 0.08 K/UL — SIGNIFICANT CHANGE UP (ref 0–0.5)
EOSINOPHIL # BLD AUTO: 0.1 K/UL
EOSINOPHIL # BLD AUTO: 0.11 K/UL
EOSINOPHIL # BLD AUTO: 0.11 K/UL
EOSINOPHIL # BLD AUTO: 0.11 K/UL — SIGNIFICANT CHANGE UP (ref 0–0.5)
EOSINOPHIL # BLD AUTO: 0.13 K/UL — SIGNIFICANT CHANGE UP (ref 0–0.5)
EOSINOPHIL # BLD AUTO: 0.14 K/UL
EOSINOPHIL # BLD AUTO: 0.14 K/UL — SIGNIFICANT CHANGE UP (ref 0–0.5)
EOSINOPHIL # BLD AUTO: 0.15 K/UL
EOSINOPHIL # BLD AUTO: 0.17 K/UL
EOSINOPHIL # BLD AUTO: 0.18 K/UL
EOSINOPHIL # BLD AUTO: 0.18 K/UL — SIGNIFICANT CHANGE UP (ref 0–0.5)
EOSINOPHIL NFR BLD AUTO: 0.9 % — SIGNIFICANT CHANGE UP (ref 0–6)
EOSINOPHIL NFR BLD AUTO: 1.7 % — SIGNIFICANT CHANGE UP (ref 0–6)
EOSINOPHIL NFR BLD AUTO: 2 % — SIGNIFICANT CHANGE UP (ref 0–6)
EOSINOPHIL NFR BLD AUTO: 2.3 %
EOSINOPHIL NFR BLD AUTO: 2.8 %
EOSINOPHIL NFR BLD AUTO: 3 % — SIGNIFICANT CHANGE UP (ref 0–6)
EOSINOPHIL NFR BLD AUTO: 3.2 %
EOSINOPHIL NFR BLD AUTO: 3.2 %
EOSINOPHIL NFR BLD AUTO: 3.4 % — SIGNIFICANT CHANGE UP (ref 0–6)
EOSINOPHIL NFR BLD AUTO: 4 %
EOSINOPHIL NFR BLD AUTO: 4.4 % — SIGNIFICANT CHANGE UP (ref 0–6)
ESTIMATED AVERAGE GLUCOSE: 177 MG/DL — HIGH (ref 68–114)
ESTIMATED AVERAGE GLUCOSE: 186 MG/DL — HIGH (ref 68–114)
ESTIMATED AVERAGE GLUCOSE: 226 MG/DL — HIGH (ref 68–114)
ESTIMATED AVERAGE GLUCOSE: 258 MG/DL — HIGH (ref 68–114)
FERRITIN SERPL-MCNC: 1438 NG/ML — HIGH (ref 15–150)
FLUAV AG NPH QL: SIGNIFICANT CHANGE UP
FLUBV AG NPH QL: SIGNIFICANT CHANGE UP
FLUID INTAKE SUBSTANCE CLASS: SIGNIFICANT CHANGE UP
FOLATE SERPL-MCNC: 6.8 NG/ML — SIGNIFICANT CHANGE UP
GAS PNL BLDA: SIGNIFICANT CHANGE UP
GAS PNL BLDA: SIGNIFICANT CHANGE UP
GAS PNL BLDV: 127 MMOL/L — LOW (ref 136–145)
GAS PNL BLDV: 131 MMOL/L — LOW (ref 136–145)
GAS PNL BLDV: 133 MMOL/L — LOW (ref 136–145)
GAS PNL BLDV: SIGNIFICANT CHANGE UP
GIANT PLATELETS BLD QL SMEAR: PRESENT — SIGNIFICANT CHANGE UP
GLUCOSE BLDC GLUCOMTR-MCNC: 104 MG/DL — HIGH (ref 70–99)
GLUCOSE BLDC GLUCOMTR-MCNC: 104 MG/DL — HIGH (ref 70–99)
GLUCOSE BLDC GLUCOMTR-MCNC: 105 MG/DL — HIGH (ref 70–99)
GLUCOSE BLDC GLUCOMTR-MCNC: 110 MG/DL — HIGH (ref 70–99)
GLUCOSE BLDC GLUCOMTR-MCNC: 110 MG/DL — HIGH (ref 70–99)
GLUCOSE BLDC GLUCOMTR-MCNC: 112 MG/DL — HIGH (ref 70–99)
GLUCOSE BLDC GLUCOMTR-MCNC: 117 MG/DL — HIGH (ref 70–99)
GLUCOSE BLDC GLUCOMTR-MCNC: 124 MG/DL — HIGH (ref 70–99)
GLUCOSE BLDC GLUCOMTR-MCNC: 129 MG/DL — HIGH (ref 70–99)
GLUCOSE BLDC GLUCOMTR-MCNC: 130 MG/DL — HIGH (ref 70–99)
GLUCOSE BLDC GLUCOMTR-MCNC: 133 MG/DL — HIGH (ref 70–99)
GLUCOSE BLDC GLUCOMTR-MCNC: 134 MG/DL — HIGH (ref 70–99)
GLUCOSE BLDC GLUCOMTR-MCNC: 135 MG/DL — HIGH (ref 70–99)
GLUCOSE BLDC GLUCOMTR-MCNC: 136 MG/DL — HIGH (ref 70–99)
GLUCOSE BLDC GLUCOMTR-MCNC: 140 MG/DL — HIGH (ref 70–99)
GLUCOSE BLDC GLUCOMTR-MCNC: 141 MG/DL — HIGH (ref 70–99)
GLUCOSE BLDC GLUCOMTR-MCNC: 143 MG/DL — HIGH (ref 70–99)
GLUCOSE BLDC GLUCOMTR-MCNC: 145 MG/DL — HIGH (ref 70–99)
GLUCOSE BLDC GLUCOMTR-MCNC: 146 MG/DL — HIGH (ref 70–99)
GLUCOSE BLDC GLUCOMTR-MCNC: 154 MG/DL — HIGH (ref 70–99)
GLUCOSE BLDC GLUCOMTR-MCNC: 154 MG/DL — HIGH (ref 70–99)
GLUCOSE BLDC GLUCOMTR-MCNC: 155 MG/DL — HIGH (ref 70–99)
GLUCOSE BLDC GLUCOMTR-MCNC: 155 MG/DL — HIGH (ref 70–99)
GLUCOSE BLDC GLUCOMTR-MCNC: 156 MG/DL — HIGH (ref 70–99)
GLUCOSE BLDC GLUCOMTR-MCNC: 157 MG/DL — HIGH (ref 70–99)
GLUCOSE BLDC GLUCOMTR-MCNC: 158 MG/DL — HIGH (ref 70–99)
GLUCOSE BLDC GLUCOMTR-MCNC: 161 MG/DL — HIGH (ref 70–99)
GLUCOSE BLDC GLUCOMTR-MCNC: 164 MG/DL — HIGH (ref 70–99)
GLUCOSE BLDC GLUCOMTR-MCNC: 164 MG/DL — HIGH (ref 70–99)
GLUCOSE BLDC GLUCOMTR-MCNC: 165 MG/DL — HIGH (ref 70–99)
GLUCOSE BLDC GLUCOMTR-MCNC: 168 MG/DL — HIGH (ref 70–99)
GLUCOSE BLDC GLUCOMTR-MCNC: 168 MG/DL — HIGH (ref 70–99)
GLUCOSE BLDC GLUCOMTR-MCNC: 169 MG/DL — HIGH (ref 70–99)
GLUCOSE BLDC GLUCOMTR-MCNC: 170 MG/DL — HIGH (ref 70–99)
GLUCOSE BLDC GLUCOMTR-MCNC: 179 MG/DL — HIGH (ref 70–99)
GLUCOSE BLDC GLUCOMTR-MCNC: 183 MG/DL — HIGH (ref 70–99)
GLUCOSE BLDC GLUCOMTR-MCNC: 185 MG/DL — HIGH (ref 70–99)
GLUCOSE BLDC GLUCOMTR-MCNC: 186 MG/DL — HIGH (ref 70–99)
GLUCOSE BLDC GLUCOMTR-MCNC: 189 MG/DL — HIGH (ref 70–99)
GLUCOSE BLDC GLUCOMTR-MCNC: 190 MG/DL — HIGH (ref 70–99)
GLUCOSE BLDC GLUCOMTR-MCNC: 190 MG/DL — HIGH (ref 70–99)
GLUCOSE BLDC GLUCOMTR-MCNC: 191 MG/DL — HIGH (ref 70–99)
GLUCOSE BLDC GLUCOMTR-MCNC: 192 MG/DL — HIGH (ref 70–99)
GLUCOSE BLDC GLUCOMTR-MCNC: 192 MG/DL — HIGH (ref 70–99)
GLUCOSE BLDC GLUCOMTR-MCNC: 196 MG/DL — HIGH (ref 70–99)
GLUCOSE BLDC GLUCOMTR-MCNC: 197 MG/DL — HIGH (ref 70–99)
GLUCOSE BLDC GLUCOMTR-MCNC: 197 MG/DL — HIGH (ref 70–99)
GLUCOSE BLDC GLUCOMTR-MCNC: 207 MG/DL — HIGH (ref 70–99)
GLUCOSE BLDC GLUCOMTR-MCNC: 207 MG/DL — HIGH (ref 70–99)
GLUCOSE BLDC GLUCOMTR-MCNC: 211 MG/DL — HIGH (ref 70–99)
GLUCOSE BLDC GLUCOMTR-MCNC: 218 MG/DL — HIGH (ref 70–99)
GLUCOSE BLDC GLUCOMTR-MCNC: 218 MG/DL — HIGH (ref 70–99)
GLUCOSE BLDC GLUCOMTR-MCNC: 219 MG/DL — HIGH (ref 70–99)
GLUCOSE BLDC GLUCOMTR-MCNC: 220 MG/DL — HIGH (ref 70–99)
GLUCOSE BLDC GLUCOMTR-MCNC: 221 MG/DL — HIGH (ref 70–99)
GLUCOSE BLDC GLUCOMTR-MCNC: 231 MG/DL — HIGH (ref 70–99)
GLUCOSE BLDC GLUCOMTR-MCNC: 235 MG/DL — HIGH (ref 70–99)
GLUCOSE BLDC GLUCOMTR-MCNC: 235 MG/DL — HIGH (ref 70–99)
GLUCOSE BLDC GLUCOMTR-MCNC: 236 MG/DL — HIGH (ref 70–99)
GLUCOSE BLDC GLUCOMTR-MCNC: 243 MG/DL — HIGH (ref 70–99)
GLUCOSE BLDC GLUCOMTR-MCNC: 256 MG/DL — HIGH (ref 70–99)
GLUCOSE BLDC GLUCOMTR-MCNC: 256 MG/DL — HIGH (ref 70–99)
GLUCOSE BLDC GLUCOMTR-MCNC: 269 MG/DL — HIGH (ref 70–99)
GLUCOSE BLDC GLUCOMTR-MCNC: 273 MG/DL — HIGH (ref 70–99)
GLUCOSE BLDC GLUCOMTR-MCNC: 296 MG/DL — HIGH (ref 70–99)
GLUCOSE BLDC GLUCOMTR-MCNC: 297 MG/DL — HIGH (ref 70–99)
GLUCOSE BLDC GLUCOMTR-MCNC: 303 MG/DL — HIGH (ref 70–99)
GLUCOSE BLDC GLUCOMTR-MCNC: 328 MG/DL — HIGH (ref 70–99)
GLUCOSE BLDC GLUCOMTR-MCNC: 365 MG/DL — HIGH (ref 70–99)
GLUCOSE BLDC GLUCOMTR-MCNC: 396 MG/DL — HIGH (ref 70–99)
GLUCOSE BLDC GLUCOMTR-MCNC: 405 MG/DL — HIGH (ref 70–99)
GLUCOSE BLDC GLUCOMTR-MCNC: 427 MG/DL — HIGH (ref 70–99)
GLUCOSE BLDC GLUCOMTR-MCNC: 503 MG/DL — CRITICAL HIGH (ref 70–99)
GLUCOSE BLDC GLUCOMTR-MCNC: 68 MG/DL — LOW (ref 70–99)
GLUCOSE BLDC GLUCOMTR-MCNC: 74 MG/DL — SIGNIFICANT CHANGE UP (ref 70–99)
GLUCOSE BLDC GLUCOMTR-MCNC: 78 MG/DL — SIGNIFICANT CHANGE UP (ref 70–99)
GLUCOSE BLDC GLUCOMTR-MCNC: 88 MG/DL — SIGNIFICANT CHANGE UP (ref 70–99)
GLUCOSE BLDC GLUCOMTR-MCNC: 95 MG/DL — SIGNIFICANT CHANGE UP (ref 70–99)
GLUCOSE BLDC GLUCOMTR-MCNC: 97 MG/DL — SIGNIFICANT CHANGE UP (ref 70–99)
GLUCOSE BLDC GLUCOMTR-MCNC: 98 MG/DL — SIGNIFICANT CHANGE UP (ref 70–99)
GLUCOSE BLDC GLUCOMTR-MCNC: 98 MG/DL — SIGNIFICANT CHANGE UP (ref 70–99)
GLUCOSE BLDC GLUCOMTR-MCNC: 99 MG/DL — SIGNIFICANT CHANGE UP (ref 70–99)
GLUCOSE BLDC GLUCOMTR-MCNC: >530 MG/DL — CRITICAL HIGH (ref 70–99)
GLUCOSE BLDV-MCNC: 409 MG/DL — HIGH (ref 70–99)
GLUCOSE BLDV-MCNC: 486 MG/DL — CRITICAL HIGH (ref 70–99)
GLUCOSE BLDV-MCNC: 614 MG/DL — CRITICAL HIGH (ref 70–99)
GLUCOSE SERPL-MCNC: 102 MG/DL — HIGH (ref 70–99)
GLUCOSE SERPL-MCNC: 104 MG/DL — HIGH (ref 70–99)
GLUCOSE SERPL-MCNC: 138 MG/DL — HIGH (ref 70–99)
GLUCOSE SERPL-MCNC: 146 MG/DL — HIGH (ref 70–99)
GLUCOSE SERPL-MCNC: 152 MG/DL — HIGH (ref 70–99)
GLUCOSE SERPL-MCNC: 156 MG/DL — HIGH (ref 70–99)
GLUCOSE SERPL-MCNC: 157 MG/DL — HIGH (ref 70–99)
GLUCOSE SERPL-MCNC: 160 MG/DL — HIGH (ref 70–99)
GLUCOSE SERPL-MCNC: 165 MG/DL — HIGH (ref 70–99)
GLUCOSE SERPL-MCNC: 175 MG/DL — HIGH (ref 70–99)
GLUCOSE SERPL-MCNC: 186 MG/DL — HIGH (ref 70–99)
GLUCOSE SERPL-MCNC: 197 MG/DL — HIGH (ref 70–99)
GLUCOSE SERPL-MCNC: 201 MG/DL — HIGH (ref 70–99)
GLUCOSE SERPL-MCNC: 202 MG/DL — HIGH (ref 70–99)
GLUCOSE SERPL-MCNC: 206 MG/DL — HIGH (ref 70–99)
GLUCOSE SERPL-MCNC: 218 MG/DL — HIGH (ref 70–99)
GLUCOSE SERPL-MCNC: 219 MG/DL — HIGH (ref 70–99)
GLUCOSE SERPL-MCNC: 231 MG/DL — HIGH (ref 70–99)
GLUCOSE SERPL-MCNC: 259 MG/DL — HIGH (ref 70–99)
GLUCOSE SERPL-MCNC: 277 MG/DL
GLUCOSE SERPL-MCNC: 287 MG/DL — HIGH (ref 70–99)
GLUCOSE SERPL-MCNC: 288 MG/DL — HIGH (ref 70–99)
GLUCOSE SERPL-MCNC: 348 MG/DL — HIGH (ref 70–99)
GLUCOSE SERPL-MCNC: 457 MG/DL — CRITICAL HIGH (ref 70–99)
GLUCOSE SERPL-MCNC: 533 MG/DL — CRITICAL HIGH (ref 70–99)
GLUCOSE SERPL-MCNC: 67 MG/DL — LOW (ref 70–99)
GRAM STN FLD: SIGNIFICANT CHANGE UP
HBV SURFACE AB SER-ACNC: 3.8 MIU/ML — LOW
HBV SURFACE AB SER-ACNC: 4.6 MIU/ML — LOW
HBV SURFACE AG SER-ACNC: SIGNIFICANT CHANGE UP
HBV SURFACE AG SER-ACNC: SIGNIFICANT CHANGE UP
HCO3 BLDA-SCNC: 28 MMOL/L — SIGNIFICANT CHANGE UP (ref 21–28)
HCO3 BLDA-SCNC: 30 MMOL/L — HIGH (ref 21–28)
HCO3 BLDV-SCNC: 30 MMOL/L — HIGH (ref 22–29)
HCO3 BLDV-SCNC: 33 MMOL/L — HIGH (ref 22–29)
HCO3 BLDV-SCNC: 34 MMOL/L — HIGH (ref 22–29)
HCT VFR BLD CALC: 20.7 % — CRITICAL LOW (ref 34.5–45)
HCT VFR BLD CALC: 21.4 % — LOW (ref 34.5–45)
HCT VFR BLD CALC: 22.1 % — LOW (ref 34.5–45)
HCT VFR BLD CALC: 24 % — LOW (ref 34.5–45)
HCT VFR BLD CALC: 26.6 % — LOW (ref 34.5–45)
HCT VFR BLD CALC: 28.1 % — LOW (ref 34.5–45)
HCT VFR BLD CALC: 29.6 %
HCT VFR BLD CALC: 30.1 %
HCT VFR BLD CALC: 30.1 % — LOW (ref 34.5–45)
HCT VFR BLD CALC: 30.6 % — LOW (ref 34.5–45)
HCT VFR BLD CALC: 31 % — LOW (ref 34.5–45)
HCT VFR BLD CALC: 31.3 % — LOW (ref 34.5–45)
HCT VFR BLD CALC: 31.5 %
HCT VFR BLD CALC: 31.5 % — LOW (ref 34.5–45)
HCT VFR BLD CALC: 31.8 %
HCT VFR BLD CALC: 32.9 % — LOW (ref 34.5–45)
HCT VFR BLD CALC: 35.1 %
HCT VFR BLD CALC: 35.1 % — SIGNIFICANT CHANGE UP (ref 34.5–45)
HCT VFR BLD CALC: 35.1 % — SIGNIFICANT CHANGE UP (ref 34.5–45)
HCT VFR BLD CALC: 35.3 % — SIGNIFICANT CHANGE UP (ref 34.5–45)
HCT VFR BLD CALC: 35.7 % — SIGNIFICANT CHANGE UP (ref 34.5–45)
HCT VFR BLD CALC: 35.8 % — SIGNIFICANT CHANGE UP (ref 34.5–45)
HCT VFR BLD CALC: 36 % — SIGNIFICANT CHANGE UP (ref 34.5–45)
HCT VFR BLD CALC: 36.4 % — SIGNIFICANT CHANGE UP (ref 34.5–45)
HCT VFR BLD CALC: 36.6 % — SIGNIFICANT CHANGE UP (ref 34.5–45)
HCT VFR BLD CALC: 36.9 %
HCT VFR BLD CALC: 36.9 % — SIGNIFICANT CHANGE UP (ref 34.5–45)
HCT VFR BLD CALC: 37 % — SIGNIFICANT CHANGE UP (ref 34.5–45)
HCT VFR BLD CALC: 37.5 % — SIGNIFICANT CHANGE UP (ref 34.5–45)
HCT VFR BLD CALC: 37.9 % — SIGNIFICANT CHANGE UP (ref 34.5–45)
HCT VFR BLD CALC: 38.2 % — SIGNIFICANT CHANGE UP (ref 34.5–45)
HCT VFR BLD CALC: 39.7 %
HCT VFR BLD CALC: 41.3 % — SIGNIFICANT CHANGE UP (ref 34.5–45)
HCT VFR BLDA CALC: 21 % — SIGNIFICANT CHANGE UP
HCT VFR BLDA CALC: 35 % — SIGNIFICANT CHANGE UP
HCT VFR BLDA CALC: 41 % — SIGNIFICANT CHANGE UP
HCV AB S/CO SERPL IA: 0.11 S/CO — SIGNIFICANT CHANGE UP (ref 0–0.99)
HCV AB SERPL-IMP: SIGNIFICANT CHANGE UP
HDLC SERPL-MCNC: 46 MG/DL — LOW
HDLC SERPL-MCNC: 48 MG/DL — LOW
HGB BLD CALC-MCNC: 11.5 G/DL — LOW (ref 11.7–16.1)
HGB BLD CALC-MCNC: 13.6 G/DL — SIGNIFICANT CHANGE UP (ref 11.7–16.1)
HGB BLD CALC-MCNC: 6.9 G/DL — CRITICAL LOW (ref 11.7–16.1)
HGB BLD-MCNC: 10.6 G/DL
HGB BLD-MCNC: 10.6 G/DL — LOW (ref 11.5–15.5)
HGB BLD-MCNC: 10.6 G/DL — LOW (ref 11.5–15.5)
HGB BLD-MCNC: 10.7 G/DL
HGB BLD-MCNC: 10.9 G/DL — LOW (ref 11.5–15.5)
HGB BLD-MCNC: 11 G/DL — LOW (ref 11.5–15.5)
HGB BLD-MCNC: 11.1 G/DL — LOW (ref 11.5–15.5)
HGB BLD-MCNC: 11.3 G/DL — LOW (ref 11.5–15.5)
HGB BLD-MCNC: 11.3 G/DL — LOW (ref 11.5–15.5)
HGB BLD-MCNC: 11.4 G/DL
HGB BLD-MCNC: 11.5 G/DL — SIGNIFICANT CHANGE UP (ref 11.5–15.5)
HGB BLD-MCNC: 11.8 G/DL — SIGNIFICANT CHANGE UP (ref 11.5–15.5)
HGB BLD-MCNC: 11.9 G/DL — SIGNIFICANT CHANGE UP (ref 11.5–15.5)
HGB BLD-MCNC: 11.9 G/DL — SIGNIFICANT CHANGE UP (ref 11.5–15.5)
HGB BLD-MCNC: 13 G/DL — SIGNIFICANT CHANGE UP (ref 11.5–15.5)
HGB BLD-MCNC: 6.5 G/DL — CRITICAL LOW (ref 11.5–15.5)
HGB BLD-MCNC: 6.7 G/DL — CRITICAL LOW (ref 11.5–15.5)
HGB BLD-MCNC: 6.8 G/DL — CRITICAL LOW (ref 11.5–15.5)
HGB BLD-MCNC: 7.5 G/DL — LOW (ref 11.5–15.5)
HGB BLD-MCNC: 8.4 G/DL — LOW (ref 11.5–15.5)
HGB BLD-MCNC: 8.6 G/DL — LOW (ref 11.5–15.5)
HGB BLD-MCNC: 9.1 G/DL
HGB BLD-MCNC: 9.1 G/DL
HGB BLD-MCNC: 9.1 G/DL — LOW (ref 11.5–15.5)
HGB BLD-MCNC: 9.2 G/DL
HGB BLD-MCNC: 9.2 G/DL
HGB BLD-MCNC: 9.3 G/DL — LOW (ref 11.5–15.5)
HGB BLD-MCNC: 9.5 G/DL — LOW (ref 11.5–15.5)
HGB BLD-MCNC: 9.9 G/DL — LOW (ref 11.5–15.5)
HOROWITZ INDEX BLDA+IHG-RTO: 100 — SIGNIFICANT CHANGE UP
HOROWITZ INDEX BLDA+IHG-RTO: 50 — SIGNIFICANT CHANGE UP
IMM GRANULOCYTES NFR BLD AUTO: 0 %
IMM GRANULOCYTES NFR BLD AUTO: 0.2 %
IMM GRANULOCYTES NFR BLD AUTO: 0.2 % — SIGNIFICANT CHANGE UP (ref 0–1.5)
IMM GRANULOCYTES NFR BLD AUTO: 0.3 %
IMM GRANULOCYTES NFR BLD AUTO: 0.3 %
IMM GRANULOCYTES NFR BLD AUTO: 0.4 %
IMM GRANULOCYTES NFR BLD AUTO: 0.4 % — SIGNIFICANT CHANGE UP (ref 0–1.5)
IMM GRANULOCYTES NFR BLD AUTO: 0.4 % — SIGNIFICANT CHANGE UP (ref 0–1.5)
IMM GRANULOCYTES NFR BLD AUTO: 0.5 %
IMM GRANULOCYTES NFR BLD AUTO: 0.5 % — SIGNIFICANT CHANGE UP (ref 0–1.5)
IMM GRANULOCYTES NFR BLD AUTO: 0.5 % — SIGNIFICANT CHANGE UP (ref 0–1.5)
INR BLD: 1.05 RATIO — SIGNIFICANT CHANGE UP (ref 0.88–1.16)
INR BLD: 1.16 RATIO — SIGNIFICANT CHANGE UP (ref 0.88–1.16)
INR BLD: 1.16 RATIO — SIGNIFICANT CHANGE UP (ref 0.88–1.16)
INR BLD: 1.19 RATIO — HIGH (ref 0.88–1.16)
INR BLD: 1.2 RATIO — HIGH (ref 0.88–1.16)
INR BLD: 1.2 RATIO — HIGH (ref 0.88–1.16)
INR BLD: 1.26 RATIO — HIGH (ref 0.88–1.16)
INR PPP: 0.97 RATIO
INR PPP: 1 RATIO
INR PPP: 1.04 RATIO
INR PPP: 1.05 RATIO
INR PPP: 1.06 RATIO
INR PPP: 1.08 RATIO
INR PPP: 1.14 RATIO
IRON SATN MFR SERPL: 30 % — SIGNIFICANT CHANGE UP (ref 14–50)
IRON SATN MFR SERPL: 52 UG/DL — SIGNIFICANT CHANGE UP (ref 37–145)
LACTATE BLDV-MCNC: 1.4 MMOL/L — SIGNIFICANT CHANGE UP (ref 0.5–2)
LACTATE BLDV-MCNC: 1.4 MMOL/L — SIGNIFICANT CHANGE UP (ref 0.5–2)
LACTATE BLDV-MCNC: 1.5 MMOL/L — SIGNIFICANT CHANGE UP (ref 0.5–2)
LACTATE BLDV-MCNC: 2 MMOL/L — SIGNIFICANT CHANGE UP (ref 0.5–2)
LACTATE BLDV-MCNC: 3.9 MMOL/L — HIGH (ref 0.5–2)
LACTATE SERPL-SCNC: 1 MMOL/L — SIGNIFICANT CHANGE UP (ref 0.5–2)
LIDOCAIN IGE QN: 12 U/L — LOW (ref 22–51)
LIDOCAIN IGE QN: 18 U/L — LOW (ref 22–51)
LIDOCAIN IGE QN: 22 U/L — SIGNIFICANT CHANGE UP (ref 22–51)
LIDOCAIN IGE QN: 6 U/L — LOW (ref 22–51)
LIPID PNL WITH DIRECT LDL SERPL: 51 MG/DL — SIGNIFICANT CHANGE UP
LIPID PNL WITH DIRECT LDL SERPL: 59 MG/DL — SIGNIFICANT CHANGE UP
LYMPHOCYTES # BLD AUTO: 0.53 K/UL — LOW (ref 1–3.3)
LYMPHOCYTES # BLD AUTO: 0.54 K/UL — LOW (ref 1–3.3)
LYMPHOCYTES # BLD AUTO: 0.56 K/UL
LYMPHOCYTES # BLD AUTO: 0.58 K/UL — LOW (ref 1–3.3)
LYMPHOCYTES # BLD AUTO: 0.62 K/UL
LYMPHOCYTES # BLD AUTO: 0.63 K/UL
LYMPHOCYTES # BLD AUTO: 0.65 K/UL
LYMPHOCYTES # BLD AUTO: 0.68 K/UL
LYMPHOCYTES # BLD AUTO: 0.68 K/UL — LOW (ref 1–3.3)
LYMPHOCYTES # BLD AUTO: 0.69 K/UL — LOW (ref 1–3.3)
LYMPHOCYTES # BLD AUTO: 0.71 K/UL
LYMPHOCYTES # BLD AUTO: 0.94 K/UL
LYMPHOCYTES # BLD AUTO: 0.97 K/UL — LOW (ref 1–3.3)
LYMPHOCYTES # BLD AUTO: 14.5 % — SIGNIFICANT CHANGE UP (ref 13–44)
LYMPHOCYTES # BLD AUTO: 15 % — SIGNIFICANT CHANGE UP (ref 13–44)
LYMPHOCYTES # BLD AUTO: 16.7 % — SIGNIFICANT CHANGE UP (ref 13–44)
LYMPHOCYTES # BLD AUTO: 20.7 % — SIGNIFICANT CHANGE UP (ref 13–44)
LYMPHOCYTES # BLD AUTO: 8.7 % — LOW (ref 13–44)
LYMPHOCYTES # BLD AUTO: 9.4 % — LOW (ref 13–44)
LYMPHOCYTES # FLD: 67 % — SIGNIFICANT CHANGE UP
LYMPHOCYTES NFR BLD AUTO: 12.3 %
LYMPHOCYTES NFR BLD AUTO: 12.6 %
LYMPHOCYTES NFR BLD AUTO: 13.2 %
LYMPHOCYTES NFR BLD AUTO: 14.8 %
LYMPHOCYTES NFR BLD AUTO: 15.8 %
LYMPHOCYTES NFR BLD AUTO: 16.8 %
LYMPHOCYTES NFR BLD AUTO: 21.5 %
MAGNESIUM SERPL-MCNC: 1.8 MG/DL — SIGNIFICANT CHANGE UP (ref 1.8–2.6)
MAGNESIUM SERPL-MCNC: 1.9 MG/DL — SIGNIFICANT CHANGE UP (ref 1.6–2.6)
MAGNESIUM SERPL-MCNC: 2 MG/DL — SIGNIFICANT CHANGE UP (ref 1.6–2.6)
MAGNESIUM SERPL-MCNC: 2 MG/DL — SIGNIFICANT CHANGE UP (ref 1.6–2.6)
MAGNESIUM SERPL-MCNC: 2.1 MG/DL — SIGNIFICANT CHANGE UP (ref 1.6–2.6)
MAGNESIUM SERPL-MCNC: 2.2 MG/DL — SIGNIFICANT CHANGE UP (ref 1.6–2.6)
MAGNESIUM SERPL-MCNC: 2.3 MG/DL — SIGNIFICANT CHANGE UP (ref 1.6–2.6)
MAGNESIUM SERPL-MCNC: 2.3 MG/DL — SIGNIFICANT CHANGE UP (ref 1.6–2.6)
MAGNESIUM SERPL-MCNC: 2.4 MG/DL — SIGNIFICANT CHANGE UP (ref 1.6–2.6)
MAGNESIUM SERPL-MCNC: 2.5 MG/DL — SIGNIFICANT CHANGE UP (ref 1.6–2.6)
MAGNESIUM SERPL-MCNC: 2.5 MG/DL — SIGNIFICANT CHANGE UP (ref 1.6–2.6)
MAGNESIUM SERPL-MCNC: 2.7 MG/DL — HIGH (ref 1.6–2.6)
MAN DIFF?: NORMAL
MANUAL SMEAR VERIFICATION: SIGNIFICANT CHANGE UP
MCHC RBC-ENTMCNC: 27.2 PG
MCHC RBC-ENTMCNC: 27.7 PG — SIGNIFICANT CHANGE UP (ref 27–34)
MCHC RBC-ENTMCNC: 27.7 PG — SIGNIFICANT CHANGE UP (ref 27–34)
MCHC RBC-ENTMCNC: 27.8 PG — SIGNIFICANT CHANGE UP (ref 27–34)
MCHC RBC-ENTMCNC: 27.8 PG — SIGNIFICANT CHANGE UP (ref 27–34)
MCHC RBC-ENTMCNC: 27.9 PG
MCHC RBC-ENTMCNC: 27.9 PG
MCHC RBC-ENTMCNC: 27.9 PG — SIGNIFICANT CHANGE UP (ref 27–34)
MCHC RBC-ENTMCNC: 27.9 PG — SIGNIFICANT CHANGE UP (ref 27–34)
MCHC RBC-ENTMCNC: 28 PG
MCHC RBC-ENTMCNC: 28 PG — SIGNIFICANT CHANGE UP (ref 27–34)
MCHC RBC-ENTMCNC: 28.1 PG — SIGNIFICANT CHANGE UP (ref 27–34)
MCHC RBC-ENTMCNC: 28.2 PG
MCHC RBC-ENTMCNC: 28.2 PG — SIGNIFICANT CHANGE UP (ref 27–34)
MCHC RBC-ENTMCNC: 28.3 PG
MCHC RBC-ENTMCNC: 28.3 PG — SIGNIFICANT CHANGE UP (ref 27–34)
MCHC RBC-ENTMCNC: 28.3 PG — SIGNIFICANT CHANGE UP (ref 27–34)
MCHC RBC-ENTMCNC: 28.4 PG — SIGNIFICANT CHANGE UP (ref 27–34)
MCHC RBC-ENTMCNC: 28.4 PG — SIGNIFICANT CHANGE UP (ref 27–34)
MCHC RBC-ENTMCNC: 28.5 PG — SIGNIFICANT CHANGE UP (ref 27–34)
MCHC RBC-ENTMCNC: 28.5 PG — SIGNIFICANT CHANGE UP (ref 27–34)
MCHC RBC-ENTMCNC: 28.6 GM/DL
MCHC RBC-ENTMCNC: 28.6 PG — SIGNIFICANT CHANGE UP (ref 27–34)
MCHC RBC-ENTMCNC: 28.7 GM/DL
MCHC RBC-ENTMCNC: 28.7 GM/DL
MCHC RBC-ENTMCNC: 28.7 PG
MCHC RBC-ENTMCNC: 28.8 PG — SIGNIFICANT CHANGE UP (ref 27–34)
MCHC RBC-ENTMCNC: 29 PG — SIGNIFICANT CHANGE UP (ref 27–34)
MCHC RBC-ENTMCNC: 29 PG — SIGNIFICANT CHANGE UP (ref 27–34)
MCHC RBC-ENTMCNC: 29.1 PG — SIGNIFICANT CHANGE UP (ref 27–34)
MCHC RBC-ENTMCNC: 29.2 GM/DL
MCHC RBC-ENTMCNC: 29.6 PG — SIGNIFICANT CHANGE UP (ref 27–34)
MCHC RBC-ENTMCNC: 29.7 GM/DL — LOW (ref 32–36)
MCHC RBC-ENTMCNC: 30 GM/DL — LOW (ref 32–36)
MCHC RBC-ENTMCNC: 30.1 GM/DL — LOW (ref 32–36)
MCHC RBC-ENTMCNC: 30.2 GM/DL — LOW (ref 32–36)
MCHC RBC-ENTMCNC: 30.3 GM/DL — LOW (ref 32–36)
MCHC RBC-ENTMCNC: 30.5 GM/DL
MCHC RBC-ENTMCNC: 30.6 GM/DL
MCHC RBC-ENTMCNC: 30.6 GM/DL — LOW (ref 32–36)
MCHC RBC-ENTMCNC: 30.6 GM/DL — LOW (ref 32–36)
MCHC RBC-ENTMCNC: 30.7 GM/DL
MCHC RBC-ENTMCNC: 30.8 GM/DL — LOW (ref 32–36)
MCHC RBC-ENTMCNC: 30.9 GM/DL — LOW (ref 32–36)
MCHC RBC-ENTMCNC: 31 GM/DL — LOW (ref 32–36)
MCHC RBC-ENTMCNC: 31.1 GM/DL — LOW (ref 32–36)
MCHC RBC-ENTMCNC: 31.2 GM/DL — LOW (ref 32–36)
MCHC RBC-ENTMCNC: 31.3 GM/DL — LOW (ref 32–36)
MCHC RBC-ENTMCNC: 31.4 GM/DL — LOW (ref 32–36)
MCHC RBC-ENTMCNC: 31.4 GM/DL — LOW (ref 32–36)
MCHC RBC-ENTMCNC: 31.5 GM/DL — LOW (ref 32–36)
MCHC RBC-ENTMCNC: 31.6 GM/DL — LOW (ref 32–36)
MCHC RBC-ENTMCNC: 31.7 GM/DL — LOW (ref 32–36)
MCHC RBC-ENTMCNC: 31.7 GM/DL — LOW (ref 32–36)
MCHC RBC-ENTMCNC: 31.8 GM/DL — LOW (ref 32–36)
MCHC RBC-ENTMCNC: 31.9 GM/DL — LOW (ref 32–36)
MCHC RBC-ENTMCNC: 32.4 GM/DL — SIGNIFICANT CHANGE UP (ref 32–36)
MCV RBC AUTO: 88.8 FL — SIGNIFICANT CHANGE UP (ref 80–100)
MCV RBC AUTO: 89 FL — SIGNIFICANT CHANGE UP (ref 80–100)
MCV RBC AUTO: 89.6 FL — SIGNIFICANT CHANGE UP (ref 80–100)
MCV RBC AUTO: 89.7 FL — SIGNIFICANT CHANGE UP (ref 80–100)
MCV RBC AUTO: 90.1 FL — SIGNIFICANT CHANGE UP (ref 80–100)
MCV RBC AUTO: 90.3 FL — SIGNIFICANT CHANGE UP (ref 80–100)
MCV RBC AUTO: 90.4 FL — SIGNIFICANT CHANGE UP (ref 80–100)
MCV RBC AUTO: 90.7 FL — SIGNIFICANT CHANGE UP (ref 80–100)
MCV RBC AUTO: 90.7 FL — SIGNIFICANT CHANGE UP (ref 80–100)
MCV RBC AUTO: 90.9 FL — SIGNIFICANT CHANGE UP (ref 80–100)
MCV RBC AUTO: 91.3 FL — SIGNIFICANT CHANGE UP (ref 80–100)
MCV RBC AUTO: 91.4 FL — SIGNIFICANT CHANGE UP (ref 80–100)
MCV RBC AUTO: 91.5 FL
MCV RBC AUTO: 91.7 FL — SIGNIFICANT CHANGE UP (ref 80–100)
MCV RBC AUTO: 91.9 FL
MCV RBC AUTO: 91.9 FL — SIGNIFICANT CHANGE UP (ref 80–100)
MCV RBC AUTO: 91.9 FL — SIGNIFICANT CHANGE UP (ref 80–100)
MCV RBC AUTO: 92.4 FL
MCV RBC AUTO: 92.4 FL — SIGNIFICANT CHANGE UP (ref 80–100)
MCV RBC AUTO: 93 FL — SIGNIFICANT CHANGE UP (ref 80–100)
MCV RBC AUTO: 93 FL — SIGNIFICANT CHANGE UP (ref 80–100)
MCV RBC AUTO: 93.4 FL — SIGNIFICANT CHANGE UP (ref 80–100)
MCV RBC AUTO: 93.7 FL — SIGNIFICANT CHANGE UP (ref 80–100)
MCV RBC AUTO: 94.1 FL — SIGNIFICANT CHANGE UP (ref 80–100)
MCV RBC AUTO: 94.1 FL — SIGNIFICANT CHANGE UP (ref 80–100)
MCV RBC AUTO: 94.5 FL — SIGNIFICANT CHANGE UP (ref 80–100)
MCV RBC AUTO: 94.8 FL — SIGNIFICANT CHANGE UP (ref 80–100)
MCV RBC AUTO: 94.9 FL
MCV RBC AUTO: 97.1 FL
MCV RBC AUTO: 97.1 FL
MCV RBC AUTO: 98.1 FL
MELD SCORE WITH DIALYSIS: 24 POINTS — SIGNIFICANT CHANGE UP
MELD SCORE WITHOUT DIALYSIS: 24 POINTS — SIGNIFICANT CHANGE UP
METHOD TYPE: SIGNIFICANT CHANGE UP
MONOCYTES # BLD AUTO: 0.32 K/UL — SIGNIFICANT CHANGE UP (ref 0–0.9)
MONOCYTES # BLD AUTO: 0.33 K/UL
MONOCYTES # BLD AUTO: 0.33 K/UL — SIGNIFICANT CHANGE UP (ref 0–0.9)
MONOCYTES # BLD AUTO: 0.33 K/UL — SIGNIFICANT CHANGE UP (ref 0–0.9)
MONOCYTES # BLD AUTO: 0.34 K/UL
MONOCYTES # BLD AUTO: 0.35 K/UL
MONOCYTES # BLD AUTO: 0.37 K/UL
MONOCYTES # BLD AUTO: 0.39 K/UL
MONOCYTES # BLD AUTO: 0.39 K/UL
MONOCYTES # BLD AUTO: 0.4 K/UL — SIGNIFICANT CHANGE UP (ref 0–0.9)
MONOCYTES # BLD AUTO: 0.42 K/UL — SIGNIFICANT CHANGE UP (ref 0–0.9)
MONOCYTES # BLD AUTO: 0.45 K/UL
MONOCYTES # BLD AUTO: 0.51 K/UL — SIGNIFICANT CHANGE UP (ref 0–0.9)
MONOCYTES NFR BLD AUTO: 10.2 % — SIGNIFICANT CHANGE UP (ref 2–14)
MONOCYTES NFR BLD AUTO: 10.3 %
MONOCYTES NFR BLD AUTO: 10.4 % — SIGNIFICANT CHANGE UP (ref 2–14)
MONOCYTES NFR BLD AUTO: 5.2 % — SIGNIFICANT CHANGE UP (ref 2–14)
MONOCYTES NFR BLD AUTO: 6.9 %
MONOCYTES NFR BLD AUTO: 7 % — SIGNIFICANT CHANGE UP (ref 2–14)
MONOCYTES NFR BLD AUTO: 7.1 % — SIGNIFICANT CHANGE UP (ref 2–14)
MONOCYTES NFR BLD AUTO: 7.2 %
MONOCYTES NFR BLD AUTO: 8 %
MONOCYTES NFR BLD AUTO: 8.4 %
MONOCYTES NFR BLD AUTO: 8.6 %
MONOCYTES NFR BLD AUTO: 9 % — SIGNIFICANT CHANGE UP (ref 2–14)
MONOCYTES NFR BLD AUTO: 9.1 %
MONOS+MACROS # FLD: 26 % — SIGNIFICANT CHANGE UP
MRSA PCR RESULT.: SIGNIFICANT CHANGE UP
NEUTROPHILS # BLD AUTO: 2.69 K/UL — SIGNIFICANT CHANGE UP (ref 1.8–7.4)
NEUTROPHILS # BLD AUTO: 2.73 K/UL
NEUTROPHILS # BLD AUTO: 2.75 K/UL — SIGNIFICANT CHANGE UP (ref 1.8–7.4)
NEUTROPHILS # BLD AUTO: 2.77 K/UL
NEUTROPHILS # BLD AUTO: 2.8 K/UL
NEUTROPHILS # BLD AUTO: 3.13 K/UL
NEUTROPHILS # BLD AUTO: 3.2 K/UL — SIGNIFICANT CHANGE UP (ref 1.8–7.4)
NEUTROPHILS # BLD AUTO: 3.38 K/UL
NEUTROPHILS # BLD AUTO: 3.54 K/UL — SIGNIFICANT CHANGE UP (ref 1.8–7.4)
NEUTROPHILS # BLD AUTO: 4.07 K/UL
NEUTROPHILS # BLD AUTO: 4.1 K/UL
NEUTROPHILS # BLD AUTO: 4.42 K/UL — SIGNIFICANT CHANGE UP (ref 1.8–7.4)
NEUTROPHILS # BLD AUTO: 5.23 K/UL — SIGNIFICANT CHANGE UP (ref 1.8–7.4)
NEUTROPHILS NFR BLD AUTO: 63.4 %
NEUTROPHILS NFR BLD AUTO: 66.7 % — SIGNIFICANT CHANGE UP (ref 43–77)
NEUTROPHILS NFR BLD AUTO: 68.4 % — SIGNIFICANT CHANGE UP (ref 43–77)
NEUTROPHILS NFR BLD AUTO: 69.7 % — SIGNIFICANT CHANGE UP (ref 43–77)
NEUTROPHILS NFR BLD AUTO: 70.7 %
NEUTROPHILS NFR BLD AUTO: 71.4 %
NEUTROPHILS NFR BLD AUTO: 73.5 %
NEUTROPHILS NFR BLD AUTO: 74.4 %
NEUTROPHILS NFR BLD AUTO: 75.5 % — SIGNIFICANT CHANGE UP (ref 43–77)
NEUTROPHILS NFR BLD AUTO: 76 %
NEUTROPHILS NFR BLD AUTO: 76.1 %
NEUTROPHILS NFR BLD AUTO: 78.3 % — HIGH (ref 43–77)
NEUTROPHILS NFR BLD AUTO: 84.3 % — HIGH (ref 43–77)
NEUTROPHILS-BODY FLUID: 7 % — SIGNIFICANT CHANGE UP
NON HDL CHOLESTEROL: 59 MG/DL — SIGNIFICANT CHANGE UP
NON HDL CHOLESTEROL: 76 MG/DL — SIGNIFICANT CHANGE UP
NT-PROBNP SERPL-SCNC: HIGH PG/ML (ref 0–300)
NT-PROBNP SERPL-SCNC: HIGH PG/ML (ref 0–300)
ORGANISM # SPEC MICROSCOPIC CNT: SIGNIFICANT CHANGE UP
ORGANISM # SPEC MICROSCOPIC CNT: SIGNIFICANT CHANGE UP
PCO2 BLDA: 38 MMHG — HIGH (ref 32–35)
PCO2 BLDA: 42 MMHG — HIGH (ref 32–35)
PCO2 BLDV: 49 MMHG — HIGH (ref 39–42)
PCO2 BLDV: 50 MMHG — HIGH (ref 39–42)
PCO2 BLDV: 54 MMHG — HIGH (ref 39–42)
PH BLDA: 7.46 — HIGH (ref 7.35–7.45)
PH BLDA: 7.47 — HIGH (ref 7.35–7.45)
PH BLDV: 7.38 — SIGNIFICANT CHANGE UP (ref 7.32–7.43)
PH BLDV: 7.4 — SIGNIFICANT CHANGE UP (ref 7.32–7.43)
PH BLDV: 7.45 — HIGH (ref 7.32–7.43)
PHOSPHATE SERPL-MCNC: 3.3 MG/DL — SIGNIFICANT CHANGE UP (ref 2.4–4.7)
PHOSPHATE SERPL-MCNC: 3.7 MG/DL — SIGNIFICANT CHANGE UP (ref 2.4–4.7)
PHOSPHATE SERPL-MCNC: 3.9 MG/DL — SIGNIFICANT CHANGE UP (ref 2.4–4.7)
PHOSPHATE SERPL-MCNC: 3.9 MG/DL — SIGNIFICANT CHANGE UP (ref 2.4–4.7)
PHOSPHATE SERPL-MCNC: 4.1 MG/DL — SIGNIFICANT CHANGE UP (ref 2.4–4.7)
PHOSPHATE SERPL-MCNC: 4.4 MG/DL — SIGNIFICANT CHANGE UP (ref 2.4–4.7)
PHOSPHATE SERPL-MCNC: 4.6 MG/DL — SIGNIFICANT CHANGE UP (ref 2.4–4.7)
PHOSPHATE SERPL-MCNC: 4.6 MG/DL — SIGNIFICANT CHANGE UP (ref 2.4–4.7)
PHOSPHATE SERPL-MCNC: 5.2 MG/DL — HIGH (ref 2.4–4.7)
PHOSPHATE SERPL-MCNC: 5.2 MG/DL — HIGH (ref 2.4–4.7)
PHOSPHATE SERPL-MCNC: 5.6 MG/DL — HIGH (ref 2.4–4.7)
PHOSPHATE SERPL-MCNC: 6.1 MG/DL — HIGH (ref 2.4–4.7)
PLAT MORPH BLD: NORMAL — SIGNIFICANT CHANGE UP
PLATELET # BLD AUTO: 140 K/UL — LOW (ref 150–400)
PLATELET # BLD AUTO: 176 K/UL — SIGNIFICANT CHANGE UP (ref 150–400)
PLATELET # BLD AUTO: 181 K/UL — SIGNIFICANT CHANGE UP (ref 150–400)
PLATELET # BLD AUTO: 182 K/UL — SIGNIFICANT CHANGE UP (ref 150–400)
PLATELET # BLD AUTO: 186 K/UL — SIGNIFICANT CHANGE UP (ref 150–400)
PLATELET # BLD AUTO: 187 K/UL — SIGNIFICANT CHANGE UP (ref 150–400)
PLATELET # BLD AUTO: 190 K/UL — SIGNIFICANT CHANGE UP (ref 150–400)
PLATELET # BLD AUTO: 191 K/UL — SIGNIFICANT CHANGE UP (ref 150–400)
PLATELET # BLD AUTO: 212 K/UL — SIGNIFICANT CHANGE UP (ref 150–400)
PLATELET # BLD AUTO: 221 K/UL — SIGNIFICANT CHANGE UP (ref 150–400)
PLATELET # BLD AUTO: 222 K/UL
PLATELET # BLD AUTO: 225 K/UL — SIGNIFICANT CHANGE UP (ref 150–400)
PLATELET # BLD AUTO: 230 K/UL — SIGNIFICANT CHANGE UP (ref 150–400)
PLATELET # BLD AUTO: 231 K/UL — SIGNIFICANT CHANGE UP (ref 150–400)
PLATELET # BLD AUTO: 233 K/UL
PLATELET # BLD AUTO: 241 K/UL
PLATELET # BLD AUTO: 241 K/UL
PLATELET # BLD AUTO: 242 K/UL — SIGNIFICANT CHANGE UP (ref 150–400)
PLATELET # BLD AUTO: 249 K/UL — SIGNIFICANT CHANGE UP (ref 150–400)
PLATELET # BLD AUTO: 250 K/UL — SIGNIFICANT CHANGE UP (ref 150–400)
PLATELET # BLD AUTO: 252 K/UL — SIGNIFICANT CHANGE UP (ref 150–400)
PLATELET # BLD AUTO: 254 K/UL — SIGNIFICANT CHANGE UP (ref 150–400)
PLATELET # BLD AUTO: 255 K/UL — SIGNIFICANT CHANGE UP (ref 150–400)
PLATELET # BLD AUTO: 261 K/UL — SIGNIFICANT CHANGE UP (ref 150–400)
PLATELET # BLD AUTO: 272 K/UL — SIGNIFICANT CHANGE UP (ref 150–400)
PLATELET # BLD AUTO: 280 K/UL
PLATELET # BLD AUTO: 283 K/UL
PLATELET # BLD AUTO: 287 K/UL — SIGNIFICANT CHANGE UP (ref 150–400)
PLATELET # BLD AUTO: 288 K/UL — SIGNIFICANT CHANGE UP (ref 150–400)
PLATELET # BLD AUTO: 290 K/UL — SIGNIFICANT CHANGE UP (ref 150–400)
PLATELET # BLD AUTO: 292 K/UL — SIGNIFICANT CHANGE UP (ref 150–400)
PLATELET # BLD AUTO: 297 K/UL
PLATELET # BLD AUTO: 299 K/UL — SIGNIFICANT CHANGE UP (ref 150–400)
PO2 BLDA: 146 MMHG — HIGH (ref 83–108)
PO2 BLDA: 321 MMHG — HIGH (ref 83–108)
PO2 BLDV: 46 MMHG — HIGH (ref 25–45)
PO2 BLDV: 57 MMHG — HIGH (ref 25–45)
PO2 BLDV: <42 MMHG — SIGNIFICANT CHANGE UP (ref 25–45)
POLYCHROMASIA BLD QL SMEAR: SLIGHT — SIGNIFICANT CHANGE UP
POTASSIUM BLDV-SCNC: 4.6 MMOL/L — SIGNIFICANT CHANGE UP (ref 3.5–5.1)
POTASSIUM BLDV-SCNC: 4.9 MMOL/L — SIGNIFICANT CHANGE UP (ref 3.5–5.1)
POTASSIUM BLDV-SCNC: 5.1 MMOL/L — SIGNIFICANT CHANGE UP (ref 3.5–5.1)
POTASSIUM SERPL-MCNC: 3.4 MMOL/L — LOW (ref 3.5–5.3)
POTASSIUM SERPL-MCNC: 3.6 MMOL/L — SIGNIFICANT CHANGE UP (ref 3.5–5.3)
POTASSIUM SERPL-MCNC: 3.7 MMOL/L — SIGNIFICANT CHANGE UP (ref 3.5–5.3)
POTASSIUM SERPL-MCNC: 3.9 MMOL/L — SIGNIFICANT CHANGE UP (ref 3.5–5.3)
POTASSIUM SERPL-MCNC: 4.2 MMOL/L — SIGNIFICANT CHANGE UP (ref 3.5–5.3)
POTASSIUM SERPL-MCNC: 4.3 MMOL/L — SIGNIFICANT CHANGE UP (ref 3.5–5.3)
POTASSIUM SERPL-MCNC: 4.6 MMOL/L — SIGNIFICANT CHANGE UP (ref 3.5–5.3)
POTASSIUM SERPL-MCNC: 4.6 MMOL/L — SIGNIFICANT CHANGE UP (ref 3.5–5.3)
POTASSIUM SERPL-MCNC: 4.7 MMOL/L — SIGNIFICANT CHANGE UP (ref 3.5–5.3)
POTASSIUM SERPL-MCNC: 4.8 MMOL/L — SIGNIFICANT CHANGE UP (ref 3.5–5.3)
POTASSIUM SERPL-MCNC: 4.9 MMOL/L — SIGNIFICANT CHANGE UP (ref 3.5–5.3)
POTASSIUM SERPL-MCNC: 5 MMOL/L — SIGNIFICANT CHANGE UP (ref 3.5–5.3)
POTASSIUM SERPL-MCNC: 5.1 MMOL/L — SIGNIFICANT CHANGE UP (ref 3.5–5.3)
POTASSIUM SERPL-MCNC: 5.2 MMOL/L — SIGNIFICANT CHANGE UP (ref 3.5–5.3)
POTASSIUM SERPL-MCNC: 5.2 MMOL/L — SIGNIFICANT CHANGE UP (ref 3.5–5.3)
POTASSIUM SERPL-MCNC: 5.3 MMOL/L — SIGNIFICANT CHANGE UP (ref 3.5–5.3)
POTASSIUM SERPL-MCNC: 5.7 MMOL/L — HIGH (ref 3.5–5.3)
POTASSIUM SERPL-MCNC: 5.8 MMOL/L — HIGH (ref 3.5–5.3)
POTASSIUM SERPL-MCNC: 6 MMOL/L — HIGH (ref 3.5–5.3)
POTASSIUM SERPL-SCNC: 3.4 MMOL/L — LOW (ref 3.5–5.3)
POTASSIUM SERPL-SCNC: 3.6 MMOL/L — SIGNIFICANT CHANGE UP (ref 3.5–5.3)
POTASSIUM SERPL-SCNC: 3.7 MMOL/L — SIGNIFICANT CHANGE UP (ref 3.5–5.3)
POTASSIUM SERPL-SCNC: 3.9 MMOL/L — SIGNIFICANT CHANGE UP (ref 3.5–5.3)
POTASSIUM SERPL-SCNC: 4.2 MMOL/L — SIGNIFICANT CHANGE UP (ref 3.5–5.3)
POTASSIUM SERPL-SCNC: 4.3 MMOL/L — SIGNIFICANT CHANGE UP (ref 3.5–5.3)
POTASSIUM SERPL-SCNC: 4.4 MMOL/L
POTASSIUM SERPL-SCNC: 4.6 MMOL/L — SIGNIFICANT CHANGE UP (ref 3.5–5.3)
POTASSIUM SERPL-SCNC: 4.6 MMOL/L — SIGNIFICANT CHANGE UP (ref 3.5–5.3)
POTASSIUM SERPL-SCNC: 4.7 MMOL/L — SIGNIFICANT CHANGE UP (ref 3.5–5.3)
POTASSIUM SERPL-SCNC: 4.8 MMOL/L — SIGNIFICANT CHANGE UP (ref 3.5–5.3)
POTASSIUM SERPL-SCNC: 4.9 MMOL/L — SIGNIFICANT CHANGE UP (ref 3.5–5.3)
POTASSIUM SERPL-SCNC: 5 MMOL/L — SIGNIFICANT CHANGE UP (ref 3.5–5.3)
POTASSIUM SERPL-SCNC: 5.1 MMOL/L — SIGNIFICANT CHANGE UP (ref 3.5–5.3)
POTASSIUM SERPL-SCNC: 5.2 MMOL/L — SIGNIFICANT CHANGE UP (ref 3.5–5.3)
POTASSIUM SERPL-SCNC: 5.2 MMOL/L — SIGNIFICANT CHANGE UP (ref 3.5–5.3)
POTASSIUM SERPL-SCNC: 5.3 MMOL/L — SIGNIFICANT CHANGE UP (ref 3.5–5.3)
POTASSIUM SERPL-SCNC: 5.7 MMOL/L — HIGH (ref 3.5–5.3)
POTASSIUM SERPL-SCNC: 5.8 MMOL/L — HIGH (ref 3.5–5.3)
POTASSIUM SERPL-SCNC: 6 MMOL/L — HIGH (ref 3.5–5.3)
PROT SERPL-MCNC: 5 G/DL — LOW (ref 6.6–8.7)
PROT SERPL-MCNC: 5.4 G/DL — LOW (ref 6.6–8.7)
PROT SERPL-MCNC: 5.6 G/DL — LOW (ref 6.6–8.7)
PROT SERPL-MCNC: 6.3 G/DL — LOW (ref 6.6–8.7)
PROT SERPL-MCNC: 6.4 G/DL — LOW (ref 6.6–8.7)
PROT SERPL-MCNC: 7.3 G/DL — SIGNIFICANT CHANGE UP (ref 6.6–8.7)
PROT SERPL-MCNC: 7.5 G/DL — SIGNIFICANT CHANGE UP (ref 6.6–8.7)
PROT SERPL-MCNC: 7.8 G/DL — SIGNIFICANT CHANGE UP (ref 6.6–8.7)
PROT SERPL-MCNC: 7.8 G/DL — SIGNIFICANT CHANGE UP (ref 6.6–8.7)
PROT SERPL-MCNC: 8.1 G/DL — SIGNIFICANT CHANGE UP (ref 6.6–8.7)
PROT SERPL-MCNC: 8.2 G/DL
PROT SERPL-MCNC: 8.2 G/DL — SIGNIFICANT CHANGE UP (ref 6.6–8.7)
PROT SERPL-MCNC: 9.2 G/DL — HIGH (ref 6.6–8.7)
PROTHROM AB SERPL-ACNC: 12.2 SEC — SIGNIFICANT CHANGE UP (ref 10.5–13.4)
PROTHROM AB SERPL-ACNC: 13.5 SEC — HIGH (ref 10.5–13.4)
PROTHROM AB SERPL-ACNC: 13.5 SEC — HIGH (ref 10.5–13.4)
PROTHROM AB SERPL-ACNC: 13.8 SEC — HIGH (ref 10.5–13.4)
PROTHROM AB SERPL-ACNC: 13.9 SEC — HIGH (ref 10.5–13.4)
PROTHROM AB SERPL-ACNC: 14 SEC — HIGH (ref 10.5–13.4)
PROTHROM AB SERPL-ACNC: 14.7 SEC — HIGH (ref 10.5–13.4)
PT BLD: 11.5 SEC
PT BLD: 11.8 SEC
PT BLD: 12.2 SEC
PT BLD: 12.4 SEC
PT BLD: 12.4 SEC
PT BLD: 12.7 SEC
PT BLD: 13.3 SEC
PTH-INTACT FLD-MCNC: 92 PG/ML — HIGH (ref 15–65)
RAPID RVP RESULT: SIGNIFICANT CHANGE UP
RBC # BLD: 2.24 M/UL — LOW (ref 3.8–5.2)
RBC # BLD: 2.3 M/UL — LOW (ref 3.8–5.2)
RBC # BLD: 2.33 M/UL — LOW (ref 3.8–5.2)
RBC # BLD: 2.58 M/UL — LOW (ref 3.8–5.2)
RBC # BLD: 2.9 M/UL — LOW (ref 3.8–5.2)
RBC # BLD: 3.04 M/UL — LOW (ref 3.8–5.2)
RBC # BLD: 3.2 M/UL — LOW (ref 3.8–5.2)
RBC # BLD: 3.21 M/UL
RBC # BLD: 3.22 M/UL
RBC # BLD: 3.29 M/UL
RBC # BLD: 3.35 M/UL
RBC # BLD: 3.35 M/UL — LOW (ref 3.8–5.2)
RBC # BLD: 3.41 M/UL — LOW (ref 3.8–5.2)
RBC # BLD: 3.44 M/UL — LOW (ref 3.8–5.2)
RBC # BLD: 3.44 M/UL — LOW (ref 3.8–5.2)
RBC # BLD: 3.51 M/UL — LOW (ref 3.8–5.2)
RBC # BLD: 3.8 M/UL
RBC # BLD: 3.8 M/UL
RBC # BLD: 3.82 M/UL — SIGNIFICANT CHANGE UP (ref 3.8–5.2)
RBC # BLD: 3.82 M/UL — SIGNIFICANT CHANGE UP (ref 3.8–5.2)
RBC # BLD: 3.85 M/UL — SIGNIFICANT CHANGE UP (ref 3.8–5.2)
RBC # BLD: 3.91 M/UL — SIGNIFICANT CHANGE UP (ref 3.8–5.2)
RBC # BLD: 3.92 M/UL — SIGNIFICANT CHANGE UP (ref 3.8–5.2)
RBC # BLD: 3.93 M/UL — SIGNIFICANT CHANGE UP (ref 3.8–5.2)
RBC # BLD: 3.96 M/UL — SIGNIFICANT CHANGE UP (ref 3.8–5.2)
RBC # BLD: 4.02 M/UL — SIGNIFICANT CHANGE UP (ref 3.8–5.2)
RBC # BLD: 4.05 M/UL — SIGNIFICANT CHANGE UP (ref 3.8–5.2)
RBC # BLD: 4.07 M/UL — SIGNIFICANT CHANGE UP (ref 3.8–5.2)
RBC # BLD: 4.09 M/UL
RBC # BLD: 4.18 M/UL — SIGNIFICANT CHANGE UP (ref 3.8–5.2)
RBC # BLD: 4.61 M/UL — SIGNIFICANT CHANGE UP (ref 3.8–5.2)
RBC # FLD: 14.9 %
RBC # FLD: 15.3 % — HIGH (ref 10.3–14.5)
RBC # FLD: 15.6 %
RBC # FLD: 15.7 %
RBC # FLD: 15.7 % — HIGH (ref 10.3–14.5)
RBC # FLD: 15.7 % — HIGH (ref 10.3–14.5)
RBC # FLD: 15.9 %
RBC # FLD: 15.9 % — HIGH (ref 10.3–14.5)
RBC # FLD: 15.9 % — HIGH (ref 10.3–14.5)
RBC # FLD: 16 %
RBC # FLD: 16 % — HIGH (ref 10.3–14.5)
RBC # FLD: 16.1 % — HIGH (ref 10.3–14.5)
RBC # FLD: 16.1 % — HIGH (ref 10.3–14.5)
RBC # FLD: 16.2 % — HIGH (ref 10.3–14.5)
RBC # FLD: 16.3 %
RBC # FLD: 16.3 % — HIGH (ref 10.3–14.5)
RBC # FLD: 16.4 % — HIGH (ref 10.3–14.5)
RBC # FLD: 16.6 % — HIGH (ref 10.3–14.5)
RBC # FLD: 16.7 % — HIGH (ref 10.3–14.5)
RBC # FLD: 16.8 %
RBC # FLD: 16.8 % — HIGH (ref 10.3–14.5)
RBC # FLD: 16.9 % — HIGH (ref 10.3–14.5)
RBC # FLD: 16.9 % — HIGH (ref 10.3–14.5)
RBC # FLD: 17.1 % — HIGH (ref 10.3–14.5)
RBC # FLD: 17.7 % — HIGH (ref 10.3–14.5)
RBC # FLD: 17.7 % — HIGH (ref 10.3–14.5)
RBC BLD AUTO: ABNORMAL
RCV VOL RI: <2000 /UL — HIGH (ref 0–0)
RSV RNA NPH QL NAA+NON-PROBE: SIGNIFICANT CHANGE UP
S AUREUS DNA NOSE QL NAA+PROBE: DETECTED
S AUREUS DNA NOSE QL NAA+PROBE: SIGNIFICANT CHANGE UP
S AUREUS DNA NOSE QL NAA+PROBE: SIGNIFICANT CHANGE UP
SAO2 % BLDA: 100 % — HIGH (ref 94–98)
SAO2 % BLDA: 99.6 % — HIGH (ref 94–98)
SAO2 % BLDV: 71.9 % — SIGNIFICANT CHANGE UP
SAO2 % BLDV: 73.1 % — SIGNIFICANT CHANGE UP
SAO2 % BLDV: 87.6 % — SIGNIFICANT CHANGE UP
SARS-COV-2 N GENE NPH QL NAA+PROBE: NOT DETECTED
SARS-COV-2 RNA SPEC QL NAA+PROBE: SIGNIFICANT CHANGE UP
SODIUM SERPL-SCNC: 126 MMOL/L — LOW (ref 135–145)
SODIUM SERPL-SCNC: 129 MMOL/L — LOW (ref 135–145)
SODIUM SERPL-SCNC: 129 MMOL/L — LOW (ref 135–145)
SODIUM SERPL-SCNC: 131 MMOL/L — LOW (ref 135–145)
SODIUM SERPL-SCNC: 132 MMOL/L — LOW (ref 135–145)
SODIUM SERPL-SCNC: 133 MMOL/L
SODIUM SERPL-SCNC: 133 MMOL/L — LOW (ref 135–145)
SODIUM SERPL-SCNC: 134 MMOL/L — LOW (ref 135–145)
SODIUM SERPL-SCNC: 135 MMOL/L — SIGNIFICANT CHANGE UP (ref 135–145)
SODIUM SERPL-SCNC: 136 MMOL/L — SIGNIFICANT CHANGE UP (ref 135–145)
SODIUM SERPL-SCNC: 138 MMOL/L — SIGNIFICANT CHANGE UP (ref 135–145)
SPECIMEN SOURCE: SIGNIFICANT CHANGE UP
T4 AB SER-ACNC: 7.7 UG/DL — SIGNIFICANT CHANGE UP (ref 4.5–12)
TIBC SERPL-MCNC: 176 UG/DL — LOW (ref 220–430)
TOTAL NUCLEATED CELL COUNT, BODY FLUID: 169 /UL — SIGNIFICANT CHANGE UP
TRANSFERRIN SERPL-MCNC: 123 MG/DL — LOW (ref 192–382)
TRIGL SERPL-MCNC: 41 MG/DL — SIGNIFICANT CHANGE UP
TRIGL SERPL-MCNC: 85 MG/DL — SIGNIFICANT CHANGE UP
TROPONIN T SERPL-MCNC: 0.35 NG/ML — HIGH (ref 0–0.06)
TROPONIN T SERPL-MCNC: 0.35 NG/ML — HIGH (ref 0–0.06)
TROPONIN T SERPL-MCNC: 0.36 NG/ML — HIGH (ref 0–0.06)
TROPONIN T SERPL-MCNC: 0.41 NG/ML — HIGH (ref 0–0.06)
TROPONIN T SERPL-MCNC: 0.47 NG/ML — HIGH (ref 0–0.06)
TROPONIN T SERPL-MCNC: 0.57 NG/ML — HIGH (ref 0–0.06)
TROPONIN T SERPL-MCNC: 0.61 NG/ML — HIGH (ref 0–0.06)
TROPONIN T SERPL-MCNC: 0.62 NG/ML — HIGH (ref 0–0.06)
TROPONIN T SERPL-MCNC: 0.64 NG/ML — HIGH (ref 0–0.06)
TSH SERPL-MCNC: 11.36 UIU/ML — HIGH (ref 0.27–4.2)
TSH SERPL-MCNC: 14.44 UIU/ML — HIGH (ref 0.27–4.2)
TUBE TYPE: SIGNIFICANT CHANGE UP
VARIANT LYMPHS # BLD: 0.9 % — SIGNIFICANT CHANGE UP (ref 0–6)
VIT B12 SERPL-MCNC: 1535 PG/ML — HIGH (ref 232–1245)
WBC # BLD: 10.62 K/UL — HIGH (ref 3.8–10.5)
WBC # BLD: 3.86 K/UL — SIGNIFICANT CHANGE UP (ref 3.8–10.5)
WBC # BLD: 4.12 K/UL — SIGNIFICANT CHANGE UP (ref 3.8–10.5)
WBC # BLD: 4.18 K/UL — SIGNIFICANT CHANGE UP (ref 3.8–10.5)
WBC # BLD: 4.19 K/UL — SIGNIFICANT CHANGE UP (ref 3.8–10.5)
WBC # BLD: 4.68 K/UL — SIGNIFICANT CHANGE UP (ref 3.8–10.5)
WBC # BLD: 4.69 K/UL — SIGNIFICANT CHANGE UP (ref 3.8–10.5)
WBC # BLD: 4.88 K/UL — SIGNIFICANT CHANGE UP (ref 3.8–10.5)
WBC # BLD: 5.08 K/UL — SIGNIFICANT CHANGE UP (ref 3.8–10.5)
WBC # BLD: 5.22 K/UL — SIGNIFICANT CHANGE UP (ref 3.8–10.5)
WBC # BLD: 5.25 K/UL — SIGNIFICANT CHANGE UP (ref 3.8–10.5)
WBC # BLD: 5.3 K/UL — SIGNIFICANT CHANGE UP (ref 3.8–10.5)
WBC # BLD: 5.3 K/UL — SIGNIFICANT CHANGE UP (ref 3.8–10.5)
WBC # BLD: 5.35 K/UL — SIGNIFICANT CHANGE UP (ref 3.8–10.5)
WBC # BLD: 5.41 K/UL — SIGNIFICANT CHANGE UP (ref 3.8–10.5)
WBC # BLD: 5.41 K/UL — SIGNIFICANT CHANGE UP (ref 3.8–10.5)
WBC # BLD: 5.43 K/UL — SIGNIFICANT CHANGE UP (ref 3.8–10.5)
WBC # BLD: 5.64 K/UL — SIGNIFICANT CHANGE UP (ref 3.8–10.5)
WBC # BLD: 6.2 K/UL — SIGNIFICANT CHANGE UP (ref 3.8–10.5)
WBC # BLD: 6.37 K/UL — SIGNIFICANT CHANGE UP (ref 3.8–10.5)
WBC # BLD: 7.23 K/UL — SIGNIFICANT CHANGE UP (ref 3.8–10.5)
WBC # BLD: 7.34 K/UL — SIGNIFICANT CHANGE UP (ref 3.8–10.5)
WBC # BLD: 7.42 K/UL — SIGNIFICANT CHANGE UP (ref 3.8–10.5)
WBC # BLD: 7.5 K/UL — SIGNIFICANT CHANGE UP (ref 3.8–10.5)
WBC # BLD: 7.61 K/UL — SIGNIFICANT CHANGE UP (ref 3.8–10.5)
WBC # BLD: 8.93 K/UL — SIGNIFICANT CHANGE UP (ref 3.8–10.5)
WBC # FLD AUTO: 10.62 K/UL — HIGH (ref 3.8–10.5)
WBC # FLD AUTO: 3.86 K/UL
WBC # FLD AUTO: 3.86 K/UL — SIGNIFICANT CHANGE UP (ref 3.8–10.5)
WBC # FLD AUTO: 3.92 K/UL
WBC # FLD AUTO: 4.12 K/UL — SIGNIFICANT CHANGE UP (ref 3.8–10.5)
WBC # FLD AUTO: 4.18 K/UL — SIGNIFICANT CHANGE UP (ref 3.8–10.5)
WBC # FLD AUTO: 4.19 K/UL — SIGNIFICANT CHANGE UP (ref 3.8–10.5)
WBC # FLD AUTO: 4.26 K/UL
WBC # FLD AUTO: 4.37 K/UL
WBC # FLD AUTO: 4.54 K/UL
WBC # FLD AUTO: 4.68 K/UL — SIGNIFICANT CHANGE UP (ref 3.8–10.5)
WBC # FLD AUTO: 4.69 K/UL — SIGNIFICANT CHANGE UP (ref 3.8–10.5)
WBC # FLD AUTO: 4.88 K/UL — SIGNIFICANT CHANGE UP (ref 3.8–10.5)
WBC # FLD AUTO: 5.08 K/UL — SIGNIFICANT CHANGE UP (ref 3.8–10.5)
WBC # FLD AUTO: 5.22 K/UL — SIGNIFICANT CHANGE UP (ref 3.8–10.5)
WBC # FLD AUTO: 5.25 K/UL — SIGNIFICANT CHANGE UP (ref 3.8–10.5)
WBC # FLD AUTO: 5.3 K/UL — SIGNIFICANT CHANGE UP (ref 3.8–10.5)
WBC # FLD AUTO: 5.3 K/UL — SIGNIFICANT CHANGE UP (ref 3.8–10.5)
WBC # FLD AUTO: 5.35 K/UL — SIGNIFICANT CHANGE UP (ref 3.8–10.5)
WBC # FLD AUTO: 5.36 K/UL
WBC # FLD AUTO: 5.39 K/UL
WBC # FLD AUTO: 5.41 K/UL — SIGNIFICANT CHANGE UP (ref 3.8–10.5)
WBC # FLD AUTO: 5.41 K/UL — SIGNIFICANT CHANGE UP (ref 3.8–10.5)
WBC # FLD AUTO: 5.43 K/UL — SIGNIFICANT CHANGE UP (ref 3.8–10.5)
WBC # FLD AUTO: 5.64 K/UL — SIGNIFICANT CHANGE UP (ref 3.8–10.5)
WBC # FLD AUTO: 6.2 K/UL — SIGNIFICANT CHANGE UP (ref 3.8–10.5)
WBC # FLD AUTO: 6.37 K/UL — SIGNIFICANT CHANGE UP (ref 3.8–10.5)
WBC # FLD AUTO: 7.23 K/UL — SIGNIFICANT CHANGE UP (ref 3.8–10.5)
WBC # FLD AUTO: 7.34 K/UL — SIGNIFICANT CHANGE UP (ref 3.8–10.5)
WBC # FLD AUTO: 7.42 K/UL — SIGNIFICANT CHANGE UP (ref 3.8–10.5)
WBC # FLD AUTO: 7.5 K/UL — SIGNIFICANT CHANGE UP (ref 3.8–10.5)
WBC # FLD AUTO: 7.61 K/UL — SIGNIFICANT CHANGE UP (ref 3.8–10.5)
WBC # FLD AUTO: 8.93 K/UL — SIGNIFICANT CHANGE UP (ref 3.8–10.5)

## 2022-01-01 PROCEDURE — 93306 TTE W/DOPPLER COMPLETE: CPT | Mod: 26

## 2022-01-01 PROCEDURE — 99291 CRITICAL CARE FIRST HOUR: CPT

## 2022-01-01 PROCEDURE — 99232 SBSQ HOSP IP/OBS MODERATE 35: CPT | Mod: FS

## 2022-01-01 PROCEDURE — 86706 HEP B SURFACE ANTIBODY: CPT

## 2022-01-01 PROCEDURE — 85025 COMPLETE CBC W/AUTO DIFF WBC: CPT

## 2022-01-01 PROCEDURE — 80053 COMPREHEN METABOLIC PANEL: CPT

## 2022-01-01 PROCEDURE — 84295 ASSAY OF SERUM SODIUM: CPT

## 2022-01-01 PROCEDURE — 99233 SBSQ HOSP IP/OBS HIGH 50: CPT

## 2022-01-01 PROCEDURE — 99497 ADVNCD CARE PLAN 30 MIN: CPT | Mod: 25

## 2022-01-01 PROCEDURE — 82310 ASSAY OF CALCIUM: CPT

## 2022-01-01 PROCEDURE — 49083 ABD PARACENTESIS W/IMAGING: CPT

## 2022-01-01 PROCEDURE — 82330 ASSAY OF CALCIUM: CPT

## 2022-01-01 PROCEDURE — 82947 ASSAY GLUCOSE BLOOD QUANT: CPT

## 2022-01-01 PROCEDURE — 83880 ASSAY OF NATRIURETIC PEPTIDE: CPT

## 2022-01-01 PROCEDURE — 74150 CT ABDOMEN W/O CONTRAST: CPT

## 2022-01-01 PROCEDURE — 70496 CT ANGIOGRAPHY HEAD: CPT

## 2022-01-01 PROCEDURE — 96375 TX/PRO/DX INJ NEW DRUG ADDON: CPT

## 2022-01-01 PROCEDURE — 71250 CT THORAX DX C-: CPT | Mod: MA

## 2022-01-01 PROCEDURE — 93010 ELECTROCARDIOGRAM REPORT: CPT

## 2022-01-01 PROCEDURE — 70450 CT HEAD/BRAIN W/O DYE: CPT | Mod: 26

## 2022-01-01 PROCEDURE — 82728 ASSAY OF FERRITIN: CPT

## 2022-01-01 PROCEDURE — 85730 THROMBOPLASTIN TIME PARTIAL: CPT

## 2022-01-01 PROCEDURE — 83540 ASSAY OF IRON: CPT

## 2022-01-01 PROCEDURE — 87340 HEPATITIS B SURFACE AG IA: CPT

## 2022-01-01 PROCEDURE — 71045 X-RAY EXAM CHEST 1 VIEW: CPT | Mod: 26

## 2022-01-01 PROCEDURE — 36415 COLL VENOUS BLD VENIPUNCTURE: CPT

## 2022-01-01 PROCEDURE — T1013: CPT

## 2022-01-01 PROCEDURE — 96374 THER/PROPH/DIAG INJ IV PUSH: CPT

## 2022-01-01 PROCEDURE — 83550 IRON BINDING TEST: CPT

## 2022-01-01 PROCEDURE — 80048 BASIC METABOLIC PNL TOTAL CA: CPT

## 2022-01-01 PROCEDURE — 70498 CT ANGIOGRAPHY NECK: CPT | Mod: 26

## 2022-01-01 PROCEDURE — G1004: CPT

## 2022-01-01 PROCEDURE — 87075 CULTR BACTERIA EXCEPT BLOOD: CPT

## 2022-01-01 PROCEDURE — 82550 ASSAY OF CK (CPK): CPT

## 2022-01-01 PROCEDURE — 86850 RBC ANTIBODY SCREEN: CPT

## 2022-01-01 PROCEDURE — 83605 ASSAY OF LACTIC ACID: CPT

## 2022-01-01 PROCEDURE — 31500 INSERT EMERGENCY AIRWAY: CPT | Mod: GC

## 2022-01-01 PROCEDURE — U0005: CPT

## 2022-01-01 PROCEDURE — 82435 ASSAY OF BLOOD CHLORIDE: CPT

## 2022-01-01 PROCEDURE — 85610 PROTHROMBIN TIME: CPT

## 2022-01-01 PROCEDURE — 80061 LIPID PANEL: CPT

## 2022-01-01 PROCEDURE — 99215 OFFICE O/P EST HI 40 MIN: CPT

## 2022-01-01 PROCEDURE — 76705 ECHO EXAM OF ABDOMEN: CPT

## 2022-01-01 PROCEDURE — 84132 ASSAY OF SERUM POTASSIUM: CPT

## 2022-01-01 PROCEDURE — 84484 ASSAY OF TROPONIN QUANT: CPT

## 2022-01-01 PROCEDURE — 82746 ASSAY OF FOLIC ACID SERUM: CPT

## 2022-01-01 PROCEDURE — 93306 TTE W/DOPPLER COMPLETE: CPT

## 2022-01-01 PROCEDURE — 36430 TRANSFUSION BLD/BLD COMPNT: CPT

## 2022-01-01 PROCEDURE — 82270 OCCULT BLOOD FECES: CPT

## 2022-01-01 PROCEDURE — 84100 ASSAY OF PHOSPHORUS: CPT

## 2022-01-01 PROCEDURE — 87205 SMEAR GRAM STAIN: CPT

## 2022-01-01 PROCEDURE — 83036 HEMOGLOBIN GLYCOSYLATED A1C: CPT

## 2022-01-01 PROCEDURE — 99231 SBSQ HOSP IP/OBS SF/LOW 25: CPT | Mod: FS

## 2022-01-01 PROCEDURE — 99214 OFFICE O/P EST MOD 30 MIN: CPT

## 2022-01-01 PROCEDURE — 74177 CT ABD & PELVIS W/CONTRAST: CPT | Mod: 26,MG

## 2022-01-01 PROCEDURE — P9016: CPT

## 2022-01-01 PROCEDURE — 99233 SBSQ HOSP IP/OBS HIGH 50: CPT | Mod: FS

## 2022-01-01 PROCEDURE — 99291 CRITICAL CARE FIRST HOUR: CPT | Mod: 25

## 2022-01-01 PROCEDURE — 99223 1ST HOSP IP/OBS HIGH 75: CPT

## 2022-01-01 PROCEDURE — 87040 BLOOD CULTURE FOR BACTERIA: CPT

## 2022-01-01 PROCEDURE — 70496 CT ANGIOGRAPHY HEAD: CPT | Mod: 26

## 2022-01-01 PROCEDURE — 12345: CPT | Mod: NC

## 2022-01-01 PROCEDURE — 99232 SBSQ HOSP IP/OBS MODERATE 35: CPT

## 2022-01-01 PROCEDURE — P9100: CPT

## 2022-01-01 PROCEDURE — 74177 CT ABD & PELVIS W/CONTRAST: CPT | Mod: MG

## 2022-01-01 PROCEDURE — 82553 CREATINE MB FRACTION: CPT

## 2022-01-01 PROCEDURE — 85018 HEMOGLOBIN: CPT

## 2022-01-01 PROCEDURE — 93970 EXTREMITY STUDY: CPT | Mod: 26

## 2022-01-01 PROCEDURE — 85027 COMPLETE CBC AUTOMATED: CPT

## 2022-01-01 PROCEDURE — 74178 CT ABD&PLV WO CNTR FLWD CNTR: CPT | Mod: 26

## 2022-01-01 PROCEDURE — 82042 OTHER SOURCE ALBUMIN QUAN EA: CPT

## 2022-01-01 PROCEDURE — U0003: CPT

## 2022-01-01 PROCEDURE — 87070 CULTURE OTHR SPECIMN AEROBIC: CPT

## 2022-01-01 PROCEDURE — 86901 BLOOD TYPING SEROLOGIC RH(D): CPT

## 2022-01-01 PROCEDURE — 87150 DNA/RNA AMPLIFIED PROBE: CPT

## 2022-01-01 PROCEDURE — 76705 ECHO EXAM OF ABDOMEN: CPT | Mod: 26

## 2022-01-01 PROCEDURE — 93005 ELECTROCARDIOGRAM TRACING: CPT

## 2022-01-01 PROCEDURE — 82140 ASSAY OF AMMONIA: CPT

## 2022-01-01 PROCEDURE — 86900 BLOOD TYPING SEROLOGIC ABO: CPT

## 2022-01-01 PROCEDURE — 86803 HEPATITIS C AB TEST: CPT

## 2022-01-01 PROCEDURE — 82803 BLOOD GASES ANY COMBINATION: CPT

## 2022-01-01 PROCEDURE — 82962 GLUCOSE BLOOD TEST: CPT

## 2022-01-01 PROCEDURE — 99239 HOSP IP/OBS DSCHRG MGMT >30: CPT

## 2022-01-01 PROCEDURE — 82040 ASSAY OF SERUM ALBUMIN: CPT

## 2022-01-01 PROCEDURE — 99261: CPT

## 2022-01-01 PROCEDURE — 83690 ASSAY OF LIPASE: CPT

## 2022-01-01 PROCEDURE — C1751: CPT

## 2022-01-01 PROCEDURE — 49082 ABD PARACENTESIS: CPT

## 2022-01-01 PROCEDURE — 71045 X-RAY EXAM CHEST 1 VIEW: CPT

## 2022-01-01 PROCEDURE — 82150 ASSAY OF AMYLASE: CPT

## 2022-01-01 PROCEDURE — 71250 CT THORAX DX C-: CPT | Mod: MG

## 2022-01-01 PROCEDURE — 99221 1ST HOSP IP/OBS SF/LOW 40: CPT | Mod: GC

## 2022-01-01 PROCEDURE — 89051 BODY FLUID CELL COUNT: CPT

## 2022-01-01 PROCEDURE — 99284 EMERGENCY DEPT VISIT MOD MDM: CPT

## 2022-01-01 PROCEDURE — 0225U NFCT DS DNA&RNA 21 SARSCOV2: CPT

## 2022-01-01 PROCEDURE — C1889: CPT

## 2022-01-01 PROCEDURE — 83970 ASSAY OF PARATHORMONE: CPT

## 2022-01-01 PROCEDURE — 99285 EMERGENCY DEPT VISIT HI MDM: CPT | Mod: 25

## 2022-01-01 PROCEDURE — 74176 CT ABD & PELVIS W/O CONTRAST: CPT | Mod: MA

## 2022-01-01 PROCEDURE — 71045 X-RAY EXAM CHEST 1 VIEW: CPT | Mod: 26,76

## 2022-01-01 PROCEDURE — P9037: CPT

## 2022-01-01 PROCEDURE — 82607 VITAMIN B-12: CPT

## 2022-01-01 PROCEDURE — 71250 CT THORAX DX C-: CPT | Mod: 26,MA

## 2022-01-01 PROCEDURE — 93970 EXTREMITY STUDY: CPT

## 2022-01-01 PROCEDURE — 74178 CT ABD&PLV WO CNTR FLWD CNTR: CPT | Mod: MA

## 2022-01-01 PROCEDURE — 82105 ALPHA-FETOPROTEIN SERUM: CPT

## 2022-01-01 PROCEDURE — ZZZZZ: CPT

## 2022-01-01 PROCEDURE — 99238 HOSP IP/OBS DSCHRG MGMT 30/<: CPT

## 2022-01-01 PROCEDURE — 70450 CT HEAD/BRAIN W/O DYE: CPT | Mod: 26,MG,59

## 2022-01-01 PROCEDURE — 74018 RADEX ABDOMEN 1 VIEW: CPT | Mod: 26

## 2022-01-01 PROCEDURE — 87641 MR-STAPH DNA AMP PROBE: CPT

## 2022-01-01 PROCEDURE — 87640 STAPH A DNA AMP PROBE: CPT

## 2022-01-01 PROCEDURE — 74176 CT ABD & PELVIS W/O CONTRAST: CPT | Mod: 26,MA

## 2022-01-01 PROCEDURE — 71250 CT THORAX DX C-: CPT | Mod: 26,MG

## 2022-01-01 PROCEDURE — 74176 CT ABD & PELVIS W/O CONTRAST: CPT | Mod: 26,MG

## 2022-01-01 PROCEDURE — 99231 SBSQ HOSP IP/OBS SF/LOW 25: CPT

## 2022-01-01 PROCEDURE — 82009 KETONE BODYS QUAL: CPT

## 2022-01-01 PROCEDURE — 74018 RADEX ABDOMEN 1 VIEW: CPT

## 2022-01-01 PROCEDURE — 99285 EMERGENCY DEPT VISIT HI MDM: CPT | Mod: GC

## 2022-01-01 PROCEDURE — 36600 WITHDRAWAL OF ARTERIAL BLOOD: CPT

## 2022-01-01 PROCEDURE — 84436 ASSAY OF TOTAL THYROXINE: CPT

## 2022-01-01 PROCEDURE — 99292 CRITICAL CARE ADDL 30 MIN: CPT

## 2022-01-01 PROCEDURE — 74176 CT ABD & PELVIS W/O CONTRAST: CPT | Mod: MG

## 2022-01-01 PROCEDURE — 85014 HEMATOCRIT: CPT

## 2022-01-01 PROCEDURE — 84443 ASSAY THYROID STIM HORMONE: CPT

## 2022-01-01 PROCEDURE — 99204 OFFICE O/P NEW MOD 45 MIN: CPT

## 2022-01-01 PROCEDURE — 99233 SBSQ HOSP IP/OBS HIGH 50: CPT | Mod: 25

## 2022-01-01 PROCEDURE — 87077 CULTURE AEROBIC IDENTIFY: CPT

## 2022-01-01 PROCEDURE — 70498 CT ANGIOGRAPHY NECK: CPT

## 2022-01-01 PROCEDURE — 83735 ASSAY OF MAGNESIUM: CPT

## 2022-01-01 PROCEDURE — 99285 EMERGENCY DEPT VISIT HI MDM: CPT

## 2022-01-01 PROCEDURE — 61210 BURR HOLE IMPLT VENTR CATH: CPT

## 2022-01-01 PROCEDURE — 94002 VENT MGMT INPAT INIT DAY: CPT

## 2022-01-01 PROCEDURE — 94760 N-INVAS EAR/PLS OXIMETRY 1: CPT

## 2022-01-01 PROCEDURE — 96376 TX/PRO/DX INJ SAME DRUG ADON: CPT

## 2022-01-01 PROCEDURE — 86923 COMPATIBILITY TEST ELECTRIC: CPT

## 2022-01-01 PROCEDURE — 94003 VENT MGMT INPAT SUBQ DAY: CPT

## 2022-01-01 PROCEDURE — 87637 SARSCOV2&INF A&B&RSV AMP PRB: CPT

## 2022-01-01 PROCEDURE — 74150 CT ABDOMEN W/O CONTRAST: CPT | Mod: 26,MH

## 2022-01-01 PROCEDURE — 84466 ASSAY OF TRANSFERRIN: CPT

## 2022-01-01 PROCEDURE — 99291 CRITICAL CARE FIRST HOUR: CPT | Mod: GC

## 2022-01-01 PROCEDURE — 99284 EMERGENCY DEPT VISIT MOD MDM: CPT | Mod: 25

## 2022-01-01 PROCEDURE — 70450 CT HEAD/BRAIN W/O DYE: CPT | Mod: MG

## 2022-01-01 PROCEDURE — 99291 CRITICAL CARE FIRST HOUR: CPT | Mod: FT,25,GC

## 2022-01-01 RX ORDER — METOPROLOL TARTRATE 50 MG
1 TABLET ORAL
Qty: 0 | Refills: 0 | DISCHARGE

## 2022-01-01 RX ORDER — SODIUM CHLORIDE 9 MG/ML
500 INJECTION INTRAMUSCULAR; INTRAVENOUS; SUBCUTANEOUS ONCE
Refills: 0 | Status: COMPLETED | OUTPATIENT
Start: 2022-01-01 | End: 2022-01-01

## 2022-01-01 RX ORDER — PANTOPRAZOLE SODIUM 20 MG/1
40 TABLET, DELAYED RELEASE ORAL
Refills: 0 | Status: DISCONTINUED | OUTPATIENT
Start: 2022-01-01 | End: 2022-01-01

## 2022-01-01 RX ORDER — METOPROLOL TARTRATE 50 MG
5 TABLET ORAL EVERY 6 HOURS
Refills: 0 | Status: DISCONTINUED | OUTPATIENT
Start: 2022-01-01 | End: 2022-01-01

## 2022-01-01 RX ORDER — HYDRALAZINE HCL 50 MG
10 TABLET ORAL
Refills: 0 | Status: DISCONTINUED | OUTPATIENT
Start: 2022-01-01 | End: 2022-01-01

## 2022-01-01 RX ORDER — PIPERACILLIN AND TAZOBACTAM 4; .5 G/20ML; G/20ML
3.38 INJECTION, POWDER, LYOPHILIZED, FOR SOLUTION INTRAVENOUS EVERY 12 HOURS
Refills: 0 | Status: DISCONTINUED | OUTPATIENT
Start: 2022-01-01 | End: 2022-01-01

## 2022-01-01 RX ORDER — SODIUM CHLORIDE 9 MG/ML
1000 INJECTION, SOLUTION INTRAVENOUS
Refills: 0 | Status: DISCONTINUED | OUTPATIENT
Start: 2022-01-01 | End: 2022-01-01

## 2022-01-01 RX ORDER — CHLORHEXIDINE GLUCONATE 213 G/1000ML
15 SOLUTION TOPICAL EVERY 12 HOURS
Refills: 0 | Status: DISCONTINUED | OUTPATIENT
Start: 2022-01-01 | End: 2022-01-01

## 2022-01-01 RX ORDER — DESMOPRESSIN ACETATE 0.1 MG/1
15 TABLET ORAL ONCE
Refills: 0 | Status: DISCONTINUED | OUTPATIENT
Start: 2022-01-01 | End: 2022-01-01

## 2022-01-01 RX ORDER — LEVOTHYROXINE SODIUM 125 MCG
75 TABLET ORAL DAILY
Refills: 0 | Status: DISCONTINUED | OUTPATIENT
Start: 2022-01-01 | End: 2022-01-01

## 2022-01-01 RX ORDER — CHLORHEXIDINE GLUCONATE 213 G/1000ML
1 SOLUTION TOPICAL DAILY
Refills: 0 | Status: DISCONTINUED | OUTPATIENT
Start: 2022-01-01 | End: 2022-01-01

## 2022-01-01 RX ORDER — GLUCAGON INJECTION, SOLUTION 0.5 MG/.1ML
1 INJECTION, SOLUTION SUBCUTANEOUS ONCE
Refills: 0 | Status: DISCONTINUED | OUTPATIENT
Start: 2022-01-01 | End: 2022-01-01

## 2022-01-01 RX ORDER — LEVOTHYROXINE SODIUM 125 MCG
TABLET ORAL
Refills: 0 | Status: DISCONTINUED | COMMUNITY
End: 2022-01-01

## 2022-01-01 RX ORDER — INSULIN GLARGINE 100 [IU]/ML
10 INJECTION, SOLUTION SUBCUTANEOUS AT BEDTIME
Refills: 0 | Status: DISCONTINUED | OUTPATIENT
Start: 2022-01-01 | End: 2022-01-01

## 2022-01-01 RX ORDER — HEPARIN SODIUM 5000 [USP'U]/ML
5000 INJECTION INTRAVENOUS; SUBCUTANEOUS EVERY 12 HOURS
Refills: 0 | Status: DISCONTINUED | OUTPATIENT
Start: 2022-01-01 | End: 2022-01-01

## 2022-01-01 RX ORDER — HYDRALAZINE HCL 50 MG
25 TABLET ORAL THREE TIMES A DAY
Refills: 0 | Status: DISCONTINUED | OUTPATIENT
Start: 2022-01-01 | End: 2022-01-01

## 2022-01-01 RX ORDER — CALCIUM ACETATE 667 MG
667 TABLET ORAL
Refills: 0 | Status: DISCONTINUED | OUTPATIENT
Start: 2022-01-01 | End: 2022-01-01

## 2022-01-01 RX ORDER — ONDANSETRON 8 MG/1
4 TABLET, FILM COATED ORAL ONCE
Refills: 0 | Status: COMPLETED | OUTPATIENT
Start: 2022-01-01 | End: 2022-01-01

## 2022-01-01 RX ORDER — ONDANSETRON 8 MG/1
4 TABLET, FILM COATED ORAL EVERY 6 HOURS
Refills: 0 | Status: DISCONTINUED | OUTPATIENT
Start: 2022-01-01 | End: 2022-01-01

## 2022-01-01 RX ORDER — METOPROLOL TARTRATE 50 MG
50 TABLET ORAL
Refills: 0 | Status: DISCONTINUED | OUTPATIENT
Start: 2022-01-01 | End: 2022-01-01

## 2022-01-01 RX ORDER — LEVOTHYROXINE SODIUM 125 MCG
50 TABLET ORAL DAILY
Refills: 0 | Status: DISCONTINUED | OUTPATIENT
Start: 2022-01-01 | End: 2022-01-01

## 2022-01-01 RX ORDER — INSULIN HUMAN 100 [IU]/ML
2 INJECTION, SOLUTION SUBCUTANEOUS
Qty: 50 | Refills: 0 | Status: DISCONTINUED | OUTPATIENT
Start: 2022-01-01 | End: 2022-01-01

## 2022-01-01 RX ORDER — INSULIN LISPRO 100/ML
VIAL (ML) SUBCUTANEOUS EVERY 6 HOURS
Refills: 0 | Status: DISCONTINUED | OUTPATIENT
Start: 2022-01-01 | End: 2022-01-01

## 2022-01-01 RX ORDER — HYDROMORPHONE HYDROCHLORIDE 2 MG/ML
0.5 INJECTION INTRAMUSCULAR; INTRAVENOUS; SUBCUTANEOUS EVERY 6 HOURS
Refills: 0 | Status: DISCONTINUED | OUTPATIENT
Start: 2022-01-01 | End: 2022-01-01

## 2022-01-01 RX ORDER — ATORVASTATIN CALCIUM 80 MG/1
40 TABLET, FILM COATED ORAL AT BEDTIME
Refills: 0 | Status: DISCONTINUED | OUTPATIENT
Start: 2022-01-01 | End: 2022-01-01

## 2022-01-01 RX ORDER — ATORVASTATIN CALCIUM 40 MG/1
40 TABLET, FILM COATED ORAL
Refills: 0 | Status: ACTIVE | COMMUNITY

## 2022-01-01 RX ORDER — DEXTROSE 50 % IN WATER 50 %
25 SYRINGE (ML) INTRAVENOUS ONCE
Refills: 0 | Status: DISCONTINUED | OUTPATIENT
Start: 2022-01-01 | End: 2022-01-01

## 2022-01-01 RX ORDER — METOPROLOL TARTRATE 50 MG
100 TABLET ORAL DAILY
Refills: 0 | Status: DISCONTINUED | OUTPATIENT
Start: 2022-01-01 | End: 2022-01-01

## 2022-01-01 RX ORDER — POTASSIUM CHLORIDE 20 MEQ
10 PACKET (EA) ORAL
Refills: 0 | Status: COMPLETED | OUTPATIENT
Start: 2022-01-01 | End: 2022-01-01

## 2022-01-01 RX ORDER — METOPROLOL TARTRATE 50 MG/1
50 TABLET, FILM COATED ORAL
Refills: 0 | Status: DISCONTINUED | COMMUNITY
End: 2022-01-01

## 2022-01-01 RX ORDER — DEXTROSE 50 % IN WATER 50 %
12.5 SYRINGE (ML) INTRAVENOUS ONCE
Refills: 0 | Status: DISCONTINUED | OUTPATIENT
Start: 2022-01-01 | End: 2022-01-01

## 2022-01-01 RX ORDER — HYDRALAZINE HCL 50 MG
1 TABLET ORAL
Qty: 0 | Refills: 0 | DISCHARGE

## 2022-01-01 RX ORDER — PIPERACILLIN AND TAZOBACTAM 4; .5 G/20ML; G/20ML
3.38 INJECTION, POWDER, LYOPHILIZED, FOR SOLUTION INTRAVENOUS ONCE
Refills: 0 | Status: COMPLETED | OUTPATIENT
Start: 2022-01-01 | End: 2022-01-01

## 2022-01-01 RX ORDER — ERYTHROPOIETIN 10000 [IU]/ML
10000 INJECTION, SOLUTION INTRAVENOUS; SUBCUTANEOUS
Refills: 0 | Status: DISCONTINUED | OUTPATIENT
Start: 2022-01-01 | End: 2022-01-01

## 2022-01-01 RX ORDER — ONDANSETRON 8 MG/1
4 TABLET, FILM COATED ORAL EVERY 8 HOURS
Refills: 0 | Status: DISCONTINUED | OUTPATIENT
Start: 2022-01-01 | End: 2022-01-01

## 2022-01-01 RX ORDER — INSULIN HUMAN 100 [IU]/ML
10 INJECTION, SOLUTION SUBCUTANEOUS ONCE
Refills: 0 | Status: COMPLETED | OUTPATIENT
Start: 2022-01-01 | End: 2022-01-01

## 2022-01-01 RX ORDER — MAGNESIUM OXIDE 400 MG ORAL TABLET 241.3 MG
400 TABLET ORAL
Refills: 0 | Status: COMPLETED | OUTPATIENT
Start: 2022-01-01 | End: 2022-01-01

## 2022-01-01 RX ORDER — AMLODIPINE BESYLATE 2.5 MG/1
10 TABLET ORAL DAILY
Refills: 0 | Status: DISCONTINUED | OUTPATIENT
Start: 2022-01-01 | End: 2022-01-01

## 2022-01-01 RX ORDER — CALCIUM ACETATE 667 MG/1
667 TABLET ORAL
Refills: 0 | Status: ACTIVE | COMMUNITY

## 2022-01-01 RX ORDER — PANTOPRAZOLE SODIUM 20 MG/1
40 TABLET, DELAYED RELEASE ORAL DAILY
Refills: 0 | Status: DISCONTINUED | OUTPATIENT
Start: 2022-01-01 | End: 2022-01-01

## 2022-01-01 RX ORDER — INSULIN LISPRO 100 [IU]/ML
INJECTION, SOLUTION INTRAVENOUS; SUBCUTANEOUS
Refills: 0 | Status: DISCONTINUED | COMMUNITY
End: 2022-01-01

## 2022-01-01 RX ORDER — HYDRALAZINE HCL 50 MG
25 TABLET ORAL EVERY 12 HOURS
Refills: 0 | Status: DISCONTINUED | OUTPATIENT
Start: 2022-01-01 | End: 2022-01-01

## 2022-01-01 RX ORDER — AMLODIPINE BESYLATE 2.5 MG/1
1 TABLET ORAL
Qty: 0 | Refills: 0 | DISCHARGE

## 2022-01-01 RX ORDER — LEVOTHYROXINE SODIUM 0.05 MG/1
50 TABLET ORAL
Refills: 0 | Status: ACTIVE | COMMUNITY

## 2022-01-01 RX ORDER — METOPROLOL TARTRATE 50 MG
25 TABLET ORAL
Refills: 0 | Status: DISCONTINUED | OUTPATIENT
Start: 2022-01-01 | End: 2022-01-01

## 2022-01-01 RX ORDER — SODIUM ZIRCONIUM CYCLOSILICATE 10 G/10G
5 POWDER, FOR SUSPENSION ORAL ONCE
Refills: 0 | Status: COMPLETED | OUTPATIENT
Start: 2022-01-01 | End: 2022-01-01

## 2022-01-01 RX ORDER — ROBINUL 0.2 MG/ML
0.4 INJECTION INTRAMUSCULAR; INTRAVENOUS EVERY 4 HOURS
Refills: 0 | Status: DISCONTINUED | OUTPATIENT
Start: 2022-01-01 | End: 2022-01-01

## 2022-01-01 RX ORDER — INSULIN HUMAN 100 [IU]/ML
2 INJECTION, SOLUTION SUBCUTANEOUS
Qty: 100 | Refills: 0 | Status: DISCONTINUED | OUTPATIENT
Start: 2022-01-01 | End: 2022-01-01

## 2022-01-01 RX ORDER — ACETAMINOPHEN 500 MG
650 TABLET ORAL ONCE
Refills: 0 | Status: COMPLETED | OUTPATIENT
Start: 2022-01-01 | End: 2022-01-01

## 2022-01-01 RX ORDER — SODIUM CHLORIDE 9 MG/ML
3 INJECTION INTRAMUSCULAR; INTRAVENOUS; SUBCUTANEOUS ONCE
Refills: 0 | Status: COMPLETED | OUTPATIENT
Start: 2022-01-01 | End: 2022-01-01

## 2022-01-01 RX ORDER — SUCRALFATE 1 G/10ML
1 SUSPENSION ORAL
Refills: 0 | Status: ACTIVE | COMMUNITY

## 2022-01-01 RX ORDER — HYDRALAZINE HCL 50 MG
50 TABLET ORAL EVERY 8 HOURS
Refills: 0 | Status: DISCONTINUED | OUTPATIENT
Start: 2022-01-01 | End: 2022-01-01

## 2022-01-01 RX ORDER — INSULIN LISPRO 100/ML
VIAL (ML) SUBCUTANEOUS
Refills: 0 | Status: DISCONTINUED | OUTPATIENT
Start: 2022-01-01 | End: 2022-01-01

## 2022-01-01 RX ORDER — METOPROLOL SUCCINATE 50 MG/1
50 TABLET, EXTENDED RELEASE ORAL
Refills: 0 | Status: ACTIVE | COMMUNITY

## 2022-01-01 RX ORDER — CEFTRIAXONE 500 MG/1
INJECTION, POWDER, FOR SOLUTION INTRAMUSCULAR; INTRAVENOUS
Refills: 0 | Status: DISCONTINUED | OUTPATIENT
Start: 2022-01-01 | End: 2022-01-01

## 2022-01-01 RX ORDER — SODIUM CHLORIDE 9 MG/ML
250 INJECTION, SOLUTION INTRAVENOUS ONCE
Refills: 0 | Status: COMPLETED | OUTPATIENT
Start: 2022-01-01 | End: 2022-01-01

## 2022-01-01 RX ORDER — ASPIRIN/CALCIUM CARB/MAGNESIUM 324 MG
81 TABLET ORAL DAILY
Refills: 0 | Status: DISCONTINUED | OUTPATIENT
Start: 2022-01-01 | End: 2022-01-01

## 2022-01-01 RX ORDER — VANCOMYCIN HCL 1 G
1000 VIAL (EA) INTRAVENOUS ONCE
Refills: 0 | Status: COMPLETED | OUTPATIENT
Start: 2022-01-01 | End: 2022-01-01

## 2022-01-01 RX ORDER — ADENOSINE 3 MG/ML
6 INJECTION INTRAVENOUS ONCE
Refills: 0 | Status: COMPLETED | OUTPATIENT
Start: 2022-01-01 | End: 2022-01-01

## 2022-01-01 RX ORDER — LABETALOL HCL 100 MG
10 TABLET ORAL
Refills: 0 | Status: DISCONTINUED | OUTPATIENT
Start: 2022-01-01 | End: 2022-01-01

## 2022-01-01 RX ORDER — SODIUM CHLORIDE 9 MG/ML
1000 INJECTION INTRAMUSCULAR; INTRAVENOUS; SUBCUTANEOUS
Refills: 0 | Status: DISCONTINUED | OUTPATIENT
Start: 2022-01-01 | End: 2022-01-01

## 2022-01-01 RX ORDER — ACETAMINOPHEN 500 MG
650 TABLET ORAL EVERY 6 HOURS
Refills: 0 | Status: DISCONTINUED | OUTPATIENT
Start: 2022-01-01 | End: 2022-01-01

## 2022-01-01 RX ORDER — HYDROMORPHONE HYDROCHLORIDE 2 MG/ML
0.5 INJECTION INTRAMUSCULAR; INTRAVENOUS; SUBCUTANEOUS
Refills: 0 | Status: DISCONTINUED | OUTPATIENT
Start: 2022-01-01 | End: 2022-01-01

## 2022-01-01 RX ORDER — SODIUM CHLORIDE 5 G/100ML
1000 INJECTION, SOLUTION INTRAVENOUS
Refills: 0 | Status: DISCONTINUED | OUTPATIENT
Start: 2022-01-01 | End: 2022-01-01

## 2022-01-01 RX ORDER — DESMOPRESSIN ACETATE 0.1 MG/1
28 TABLET ORAL ONCE
Refills: 0 | Status: COMPLETED | OUTPATIENT
Start: 2022-01-01 | End: 2022-01-01

## 2022-01-01 RX ORDER — INSULIN LISPRO 100/ML
10 VIAL (ML) SUBCUTANEOUS ONCE
Refills: 0 | Status: COMPLETED | OUTPATIENT
Start: 2022-01-01 | End: 2022-01-01

## 2022-01-01 RX ORDER — FENTANYL CITRATE 50 UG/ML
25 INJECTION INTRAVENOUS
Refills: 0 | Status: DISCONTINUED | OUTPATIENT
Start: 2022-01-01 | End: 2022-01-01

## 2022-01-01 RX ORDER — HYDRALAZINE HYDROCHLORIDE 25 MG/1
25 TABLET ORAL
Refills: 0 | Status: ACTIVE | COMMUNITY

## 2022-01-01 RX ORDER — AMLODIPINE BESYLATE 2.5 MG/1
5 TABLET ORAL ONCE
Refills: 0 | Status: COMPLETED | OUTPATIENT
Start: 2022-01-01 | End: 2022-01-01

## 2022-01-01 RX ORDER — INSULIN LISPRO 100/ML
VIAL (ML) SUBCUTANEOUS AT BEDTIME
Refills: 0 | Status: DISCONTINUED | OUTPATIENT
Start: 2022-01-01 | End: 2022-01-01

## 2022-01-01 RX ORDER — METOPROLOL TARTRATE 50 MG
5 TABLET ORAL ONCE
Refills: 0 | Status: DISCONTINUED | OUTPATIENT
Start: 2022-01-01 | End: 2022-01-01

## 2022-01-01 RX ORDER — INSULIN GLARGINE 100 [IU]/ML
8 INJECTION, SOLUTION SUBCUTANEOUS AT BEDTIME
Refills: 0 | Status: DISCONTINUED | OUTPATIENT
Start: 2022-01-01 | End: 2022-01-01

## 2022-01-01 RX ORDER — CEFAZOLIN SODIUM 1 G
1000 VIAL (EA) INJECTION ONCE
Refills: 0 | Status: COMPLETED | OUTPATIENT
Start: 2022-01-01 | End: 2022-01-01

## 2022-01-01 RX ORDER — DESMOPRESSIN ACETATE 0.1 MG/1
20 TABLET ORAL ONCE
Refills: 0 | Status: COMPLETED | OUTPATIENT
Start: 2022-01-01 | End: 2022-01-01

## 2022-01-01 RX ORDER — DEXTROSE 50 % IN WATER 50 %
50 SYRINGE (ML) INTRAVENOUS
Refills: 0 | Status: DISCONTINUED | OUTPATIENT
Start: 2022-01-01 | End: 2022-01-01

## 2022-01-01 RX ORDER — ASPIRIN/CALCIUM CARB/MAGNESIUM 324 MG
1 TABLET ORAL
Qty: 0 | Refills: 0 | DISCHARGE
Start: 2022-01-01

## 2022-01-01 RX ORDER — PANTOPRAZOLE SODIUM 20 MG/1
1 TABLET, DELAYED RELEASE ORAL
Qty: 0 | Refills: 0 | DISCHARGE

## 2022-01-01 RX ORDER — ATORVASTATIN CALCIUM 80 MG/1
1 TABLET, FILM COATED ORAL
Qty: 0 | Refills: 0 | DISCHARGE

## 2022-01-01 RX ORDER — HYDRALAZINE HYDROCHLORIDE 50 MG/1
50 TABLET ORAL
Refills: 0 | Status: DISCONTINUED | COMMUNITY
End: 2022-01-01

## 2022-01-01 RX ORDER — HYDROMORPHONE HYDROCHLORIDE 2 MG/ML
1 INJECTION INTRAMUSCULAR; INTRAVENOUS; SUBCUTANEOUS ONCE
Refills: 0 | Status: DISCONTINUED | OUTPATIENT
Start: 2022-01-01 | End: 2022-01-01

## 2022-01-01 RX ORDER — HYDRALAZINE HCL 50 MG
5 TABLET ORAL ONCE
Refills: 0 | Status: COMPLETED | OUTPATIENT
Start: 2022-01-01 | End: 2022-01-01

## 2022-01-01 RX ORDER — METOPROLOL TARTRATE 50 MG
1 TABLET ORAL
Qty: 60 | Refills: 0
Start: 2022-01-01 | End: 2022-01-01

## 2022-01-01 RX ORDER — DESMOPRESSIN ACETATE 0.1 MG/1
28 TABLET ORAL ONCE
Refills: 0 | Status: DISCONTINUED | OUTPATIENT
Start: 2022-01-01 | End: 2022-01-01

## 2022-01-01 RX ORDER — SODIUM CHLORIDE 9 MG/ML
100 INJECTION INTRAMUSCULAR; INTRAVENOUS; SUBCUTANEOUS
Refills: 0 | Status: DISCONTINUED | OUTPATIENT
Start: 2022-01-01 | End: 2022-01-01

## 2022-01-01 RX ORDER — ACETAMINOPHEN 500 MG
1000 TABLET ORAL ONCE
Refills: 0 | Status: COMPLETED | OUTPATIENT
Start: 2022-01-01 | End: 2022-01-01

## 2022-01-01 RX ORDER — DESMOPRESSIN ACETATE 0.1 MG/1
0.1 TABLET ORAL ONCE
Refills: 0 | Status: DISCONTINUED | OUTPATIENT
Start: 2022-01-01 | End: 2022-01-01

## 2022-01-01 RX ORDER — LEVOTHYROXINE SODIUM 125 MCG
1 TABLET ORAL
Qty: 0 | Refills: 0 | DISCHARGE

## 2022-01-01 RX ORDER — METOCLOPRAMIDE 5 MG/1
5 TABLET ORAL
Refills: 0 | Status: ACTIVE | COMMUNITY

## 2022-01-01 RX ORDER — FUROSEMIDE 40 MG
20 TABLET ORAL ONCE
Refills: 0 | Status: COMPLETED | OUTPATIENT
Start: 2022-01-01 | End: 2022-01-01

## 2022-01-01 RX ORDER — METOPROLOL TARTRATE 50 MG
50 TABLET ORAL DAILY
Refills: 0 | Status: DISCONTINUED | OUTPATIENT
Start: 2022-01-01 | End: 2022-01-01

## 2022-01-01 RX ORDER — DEXTROSE 50 % IN WATER 50 %
15 SYRINGE (ML) INTRAVENOUS ONCE
Refills: 0 | Status: DISCONTINUED | OUTPATIENT
Start: 2022-01-01 | End: 2022-01-01

## 2022-01-01 RX ORDER — LEVETIRACETAM 250 MG/1
1000 TABLET, FILM COATED ORAL EVERY 12 HOURS
Refills: 0 | Status: DISCONTINUED | OUTPATIENT
Start: 2022-01-01 | End: 2022-01-01

## 2022-01-01 RX ORDER — ROCURONIUM BROMIDE 10 MG/ML
50 VIAL (ML) INTRAVENOUS ONCE
Refills: 0 | Status: COMPLETED | OUTPATIENT
Start: 2022-01-01 | End: 2022-01-01

## 2022-01-01 RX ORDER — HYDRALAZINE HCL 50 MG
25 TABLET ORAL EVERY 8 HOURS
Refills: 0 | Status: DISCONTINUED | OUTPATIENT
Start: 2022-01-01 | End: 2022-01-01

## 2022-01-01 RX ORDER — DESMOPRESSIN ACETATE 0.1 MG/1
19 TABLET ORAL ONCE
Refills: 0 | Status: COMPLETED | OUTPATIENT
Start: 2022-01-01 | End: 2022-01-01

## 2022-01-01 RX ORDER — PROPOFOL 10 MG/ML
10 INJECTION, EMULSION INTRAVENOUS
Qty: 1000 | Refills: 0 | Status: DISCONTINUED | OUTPATIENT
Start: 2022-01-01 | End: 2022-01-01

## 2022-01-01 RX ORDER — PIPERACILLIN AND TAZOBACTAM 4; .5 G/20ML; G/20ML
3.38 INJECTION, POWDER, LYOPHILIZED, FOR SOLUTION INTRAVENOUS EVERY 12 HOURS
Refills: 0 | Status: COMPLETED | OUTPATIENT
Start: 2022-01-01 | End: 2022-01-01

## 2022-01-01 RX ORDER — INSULIN GLARGINE 100 [IU]/ML
13 INJECTION, SOLUTION SUBCUTANEOUS AT BEDTIME
Refills: 0 | Status: DISCONTINUED | OUTPATIENT
Start: 2022-01-01 | End: 2022-01-01

## 2022-01-01 RX ORDER — SUCRALFATE 1 G
1 TABLET ORAL ONCE
Refills: 0 | Status: COMPLETED | OUTPATIENT
Start: 2022-01-01 | End: 2022-01-01

## 2022-01-01 RX ORDER — AMLODIPINE BESYLATE 10 MG/1
10 TABLET ORAL
Refills: 0 | Status: ACTIVE | COMMUNITY

## 2022-01-01 RX ORDER — PANTOPRAZOLE 20 MG/1
20 TABLET, DELAYED RELEASE ORAL
Qty: 90 | Refills: 1 | Status: ACTIVE | COMMUNITY
Start: 2022-01-01 | End: 1900-01-01

## 2022-01-01 RX ORDER — PANTOPRAZOLE SODIUM 40 MG/1
40 TABLET, DELAYED RELEASE ORAL
Refills: 0 | Status: ACTIVE | COMMUNITY

## 2022-01-01 RX ORDER — CEFTRIAXONE 500 MG/1
2000 INJECTION, POWDER, FOR SOLUTION INTRAMUSCULAR; INTRAVENOUS EVERY 24 HOURS
Refills: 0 | Status: DISCONTINUED | OUTPATIENT
Start: 2022-01-01 | End: 2022-01-01

## 2022-01-01 RX ORDER — NICARDIPINE HYDROCHLORIDE 30 MG/1
5 CAPSULE, EXTENDED RELEASE ORAL
Qty: 40 | Refills: 0 | Status: DISCONTINUED | OUTPATIENT
Start: 2022-01-01 | End: 2022-01-01

## 2022-01-01 RX ORDER — AMLODIPINE BESYLATE 2.5 MG/1
1 TABLET ORAL
Qty: 0 | Refills: 0 | DISCHARGE
Start: 2022-01-01

## 2022-01-01 RX ORDER — MAGNESIUM SULFATE 500 MG/ML
2 VIAL (ML) INJECTION ONCE
Refills: 0 | Status: COMPLETED | OUTPATIENT
Start: 2022-01-01 | End: 2022-01-01

## 2022-01-01 RX ORDER — HYDRALAZINE HCL 50 MG
50 TABLET ORAL THREE TIMES A DAY
Refills: 0 | Status: DISCONTINUED | OUTPATIENT
Start: 2022-01-01 | End: 2022-01-01

## 2022-01-01 RX ORDER — VANCOMYCIN HCL 1 G
1050 VIAL (EA) INTRAVENOUS ONCE
Refills: 0 | Status: DISCONTINUED | OUTPATIENT
Start: 2022-01-01 | End: 2022-01-01

## 2022-01-01 RX ORDER — POTASSIUM CHLORIDE 20 MEQ
10 PACKET (EA) ORAL ONCE
Refills: 0 | Status: COMPLETED | OUTPATIENT
Start: 2022-01-01 | End: 2022-01-01

## 2022-01-01 RX ORDER — HYDRALAZINE HCL 50 MG
25 TABLET ORAL ONCE
Refills: 0 | Status: COMPLETED | OUTPATIENT
Start: 2022-01-01 | End: 2022-01-01

## 2022-01-01 RX ORDER — MORPHINE SULFATE 50 MG/1
4 CAPSULE, EXTENDED RELEASE ORAL ONCE
Refills: 0 | Status: DISCONTINUED | OUTPATIENT
Start: 2022-01-01 | End: 2022-01-01

## 2022-01-01 RX ORDER — DEXTROSE 10 % IN WATER 10 %
250 INTRAVENOUS SOLUTION INTRAVENOUS
Refills: 0 | Status: DISCONTINUED | OUTPATIENT
Start: 2022-01-01 | End: 2022-01-01

## 2022-01-01 RX ORDER — CHLORHEXIDINE GLUCONATE 213 G/1000ML
1 SOLUTION TOPICAL
Refills: 0 | Status: DISCONTINUED | OUTPATIENT
Start: 2022-01-01 | End: 2022-01-01

## 2022-01-01 RX ORDER — PIPERACILLIN AND TAZOBACTAM 4; .5 G/20ML; G/20ML
3.38 INJECTION, POWDER, LYOPHILIZED, FOR SOLUTION INTRAVENOUS EVERY 8 HOURS
Refills: 0 | Status: DISCONTINUED | OUTPATIENT
Start: 2022-01-01 | End: 2022-01-01

## 2022-01-01 RX ORDER — CEFTRIAXONE 500 MG/1
2000 INJECTION, POWDER, FOR SOLUTION INTRAMUSCULAR; INTRAVENOUS ONCE
Refills: 0 | Status: COMPLETED | OUTPATIENT
Start: 2022-01-01 | End: 2022-01-01

## 2022-01-01 RX ORDER — ALENDRONATE SODIUM 70 MG/1
1 TABLET ORAL
Qty: 0 | Refills: 0 | DISCHARGE

## 2022-01-01 RX ORDER — POTASSIUM CHLORIDE 20 MEQ
40 PACKET (EA) ORAL ONCE
Refills: 0 | Status: DISCONTINUED | OUTPATIENT
Start: 2022-01-01 | End: 2022-01-01

## 2022-01-01 RX ADMIN — HEPARIN SODIUM 5000 UNIT(S): 5000 INJECTION INTRAVENOUS; SUBCUTANEOUS at 15:06

## 2022-01-01 RX ADMIN — Medication 25 MILLIGRAM(S): at 18:22

## 2022-01-01 RX ADMIN — AMLODIPINE BESYLATE 5 MILLIGRAM(S): 2.5 TABLET ORAL at 00:46

## 2022-01-01 RX ADMIN — CHLORHEXIDINE GLUCONATE 15 MILLILITER(S): 213 SOLUTION TOPICAL at 05:16

## 2022-01-01 RX ADMIN — Medication 75 MICROGRAM(S): at 05:25

## 2022-01-01 RX ADMIN — CHLORHEXIDINE GLUCONATE 1 APPLICATION(S): 213 SOLUTION TOPICAL at 05:31

## 2022-01-01 RX ADMIN — Medication 667 MILLIGRAM(S): at 09:26

## 2022-01-01 RX ADMIN — Medication 2: at 12:04

## 2022-01-01 RX ADMIN — PANTOPRAZOLE SODIUM 40 MILLIGRAM(S): 20 TABLET, DELAYED RELEASE ORAL at 05:32

## 2022-01-01 RX ADMIN — Medication 50 MILLIGRAM(S): at 13:15

## 2022-01-01 RX ADMIN — Medication 50 MILLIGRAM(S): at 05:31

## 2022-01-01 RX ADMIN — PANTOPRAZOLE SODIUM 40 MILLIGRAM(S): 20 TABLET, DELAYED RELEASE ORAL at 05:08

## 2022-01-01 RX ADMIN — CHLORHEXIDINE GLUCONATE 15 MILLILITER(S): 213 SOLUTION TOPICAL at 05:40

## 2022-01-01 RX ADMIN — Medication 667 MILLIGRAM(S): at 21:21

## 2022-01-01 RX ADMIN — CHLORHEXIDINE GLUCONATE 15 MILLILITER(S): 213 SOLUTION TOPICAL at 17:52

## 2022-01-01 RX ADMIN — Medication 1: at 08:04

## 2022-01-01 RX ADMIN — Medication 4: at 23:18

## 2022-01-01 RX ADMIN — Medication 25 GRAM(S): at 09:58

## 2022-01-01 RX ADMIN — Medication 50 MICROGRAM(S): at 06:28

## 2022-01-01 RX ADMIN — Medication 2: at 06:26

## 2022-01-01 RX ADMIN — Medication 50 MILLIGRAM(S): at 21:33

## 2022-01-01 RX ADMIN — PANTOPRAZOLE SODIUM 40 MILLIGRAM(S): 20 TABLET, DELAYED RELEASE ORAL at 05:56

## 2022-01-01 RX ADMIN — Medication 10 UNIT(S): at 21:49

## 2022-01-01 RX ADMIN — Medication 667 MILLIGRAM(S): at 17:53

## 2022-01-01 RX ADMIN — Medication 667 MILLIGRAM(S): at 13:26

## 2022-01-01 RX ADMIN — Medication 2: at 13:20

## 2022-01-01 RX ADMIN — PIPERACILLIN AND TAZOBACTAM 25 GRAM(S): 4; .5 INJECTION, POWDER, LYOPHILIZED, FOR SOLUTION INTRAVENOUS at 17:38

## 2022-01-01 RX ADMIN — FENTANYL CITRATE 25 MICROGRAM(S): 50 INJECTION INTRAVENOUS at 10:30

## 2022-01-01 RX ADMIN — CEFTRIAXONE 100 MILLIGRAM(S): 500 INJECTION, POWDER, FOR SOLUTION INTRAMUSCULAR; INTRAVENOUS at 05:04

## 2022-01-01 RX ADMIN — HEPARIN SODIUM 5000 UNIT(S): 5000 INJECTION INTRAVENOUS; SUBCUTANEOUS at 05:18

## 2022-01-01 RX ADMIN — Medication 10 MILLIEQUIVALENT(S): at 09:38

## 2022-01-01 RX ADMIN — ATORVASTATIN CALCIUM 40 MILLIGRAM(S): 80 TABLET, FILM COATED ORAL at 21:32

## 2022-01-01 RX ADMIN — Medication 50 MILLIGRAM(S): at 05:01

## 2022-01-01 RX ADMIN — Medication 81 MILLIGRAM(S): at 08:04

## 2022-01-01 RX ADMIN — CHLORHEXIDINE GLUCONATE 15 MILLILITER(S): 213 SOLUTION TOPICAL at 05:27

## 2022-01-01 RX ADMIN — ROBINUL 0.4 MILLIGRAM(S): 0.2 INJECTION INTRAMUSCULAR; INTRAVENOUS at 14:33

## 2022-01-01 RX ADMIN — CHLORHEXIDINE GLUCONATE 1 APPLICATION(S): 213 SOLUTION TOPICAL at 11:22

## 2022-01-01 RX ADMIN — PIPERACILLIN AND TAZOBACTAM 25 GRAM(S): 4; .5 INJECTION, POWDER, LYOPHILIZED, FOR SOLUTION INTRAVENOUS at 04:25

## 2022-01-01 RX ADMIN — Medication 1: at 11:40

## 2022-01-01 RX ADMIN — PIPERACILLIN AND TAZOBACTAM 25 GRAM(S): 4; .5 INJECTION, POWDER, LYOPHILIZED, FOR SOLUTION INTRAVENOUS at 05:30

## 2022-01-01 RX ADMIN — Medication 250 MILLIGRAM(S): at 14:56

## 2022-01-01 RX ADMIN — Medication 2: at 07:35

## 2022-01-01 RX ADMIN — FENTANYL CITRATE 25 MICROGRAM(S): 50 INJECTION INTRAVENOUS at 18:45

## 2022-01-01 RX ADMIN — ATORVASTATIN CALCIUM 40 MILLIGRAM(S): 80 TABLET, FILM COATED ORAL at 21:27

## 2022-01-01 RX ADMIN — Medication 8: at 16:43

## 2022-01-01 RX ADMIN — HEPARIN SODIUM 5000 UNIT(S): 5000 INJECTION INTRAVENOUS; SUBCUTANEOUS at 18:22

## 2022-01-01 RX ADMIN — CHLORHEXIDINE GLUCONATE 15 MILLILITER(S): 213 SOLUTION TOPICAL at 18:13

## 2022-01-01 RX ADMIN — HEPARIN SODIUM 5000 UNIT(S): 5000 INJECTION INTRAVENOUS; SUBCUTANEOUS at 05:39

## 2022-01-01 RX ADMIN — CHLORHEXIDINE GLUCONATE 15 MILLILITER(S): 213 SOLUTION TOPICAL at 17:25

## 2022-01-01 RX ADMIN — ATORVASTATIN CALCIUM 40 MILLIGRAM(S): 80 TABLET, FILM COATED ORAL at 21:12

## 2022-01-01 RX ADMIN — Medication 1 DROP(S): at 05:21

## 2022-01-01 RX ADMIN — CHLORHEXIDINE GLUCONATE 15 MILLILITER(S): 213 SOLUTION TOPICAL at 05:21

## 2022-01-01 RX ADMIN — PANTOPRAZOLE SODIUM 40 MILLIGRAM(S): 20 TABLET, DELAYED RELEASE ORAL at 05:31

## 2022-01-01 RX ADMIN — CHLORHEXIDINE GLUCONATE 1 APPLICATION(S): 213 SOLUTION TOPICAL at 06:51

## 2022-01-01 RX ADMIN — INSULIN GLARGINE 13 UNIT(S): 100 INJECTION, SOLUTION SUBCUTANEOUS at 21:34

## 2022-01-01 RX ADMIN — HEPARIN SODIUM 5000 UNIT(S): 5000 INJECTION INTRAVENOUS; SUBCUTANEOUS at 05:11

## 2022-01-01 RX ADMIN — Medication 667 MILLIGRAM(S): at 21:12

## 2022-01-01 RX ADMIN — CHLORHEXIDINE GLUCONATE 15 MILLILITER(S): 213 SOLUTION TOPICAL at 17:38

## 2022-01-01 RX ADMIN — Medication 667 MILLIGRAM(S): at 12:50

## 2022-01-01 RX ADMIN — SODIUM CHLORIDE 500 MILLILITER(S): 9 INJECTION INTRAMUSCULAR; INTRAVENOUS; SUBCUTANEOUS at 13:57

## 2022-01-01 RX ADMIN — PANTOPRAZOLE SODIUM 40 MILLIGRAM(S): 20 TABLET, DELAYED RELEASE ORAL at 05:25

## 2022-01-01 RX ADMIN — Medication 50 MICROGRAM(S): at 05:22

## 2022-01-01 RX ADMIN — ATORVASTATIN CALCIUM 40 MILLIGRAM(S): 80 TABLET, FILM COATED ORAL at 21:29

## 2022-01-01 RX ADMIN — Medication 1 DROP(S): at 18:12

## 2022-01-01 RX ADMIN — Medication 4: at 08:31

## 2022-01-01 RX ADMIN — ATORVASTATIN CALCIUM 40 MILLIGRAM(S): 80 TABLET, FILM COATED ORAL at 21:21

## 2022-01-01 RX ADMIN — Medication 2: at 17:37

## 2022-01-01 RX ADMIN — Medication 25 MILLIGRAM(S): at 21:21

## 2022-01-01 RX ADMIN — SODIUM CHLORIDE 75 MILLILITER(S): 9 INJECTION INTRAMUSCULAR; INTRAVENOUS; SUBCUTANEOUS at 22:27

## 2022-01-01 RX ADMIN — Medication 2: at 11:20

## 2022-01-01 RX ADMIN — CHLORHEXIDINE GLUCONATE 15 MILLILITER(S): 213 SOLUTION TOPICAL at 17:37

## 2022-01-01 RX ADMIN — Medication 1 DROP(S): at 17:25

## 2022-01-01 RX ADMIN — Medication 10 MILLIGRAM(S): at 12:34

## 2022-01-01 RX ADMIN — Medication 50 MILLIGRAM(S): at 22:04

## 2022-01-01 RX ADMIN — CHLORHEXIDINE GLUCONATE 1 APPLICATION(S): 213 SOLUTION TOPICAL at 13:29

## 2022-01-01 RX ADMIN — CHLORHEXIDINE GLUCONATE 15 MILLILITER(S): 213 SOLUTION TOPICAL at 17:24

## 2022-01-01 RX ADMIN — INSULIN GLARGINE 13 UNIT(S): 100 INJECTION, SOLUTION SUBCUTANEOUS at 23:46

## 2022-01-01 RX ADMIN — HEPARIN SODIUM 5000 UNIT(S): 5000 INJECTION INTRAVENOUS; SUBCUTANEOUS at 17:31

## 2022-01-01 RX ADMIN — PIPERACILLIN AND TAZOBACTAM 25 GRAM(S): 4; .5 INJECTION, POWDER, LYOPHILIZED, FOR SOLUTION INTRAVENOUS at 08:47

## 2022-01-01 RX ADMIN — SODIUM CHLORIDE 250 MILLILITER(S): 9 INJECTION, SOLUTION INTRAVENOUS at 14:24

## 2022-01-01 RX ADMIN — PIPERACILLIN AND TAZOBACTAM 25 GRAM(S): 4; .5 INJECTION, POWDER, LYOPHILIZED, FOR SOLUTION INTRAVENOUS at 21:34

## 2022-01-01 RX ADMIN — Medication 4: at 08:10

## 2022-01-01 RX ADMIN — PIPERACILLIN AND TAZOBACTAM 25 GRAM(S): 4; .5 INJECTION, POWDER, LYOPHILIZED, FOR SOLUTION INTRAVENOUS at 07:42

## 2022-01-01 RX ADMIN — Medication 5 MILLIGRAM(S): at 14:28

## 2022-01-01 RX ADMIN — Medication 25 MILLIGRAM(S): at 05:19

## 2022-01-01 RX ADMIN — SODIUM CHLORIDE 500 MILLILITER(S): 9 INJECTION INTRAMUSCULAR; INTRAVENOUS; SUBCUTANEOUS at 19:52

## 2022-01-01 RX ADMIN — ATORVASTATIN CALCIUM 40 MILLIGRAM(S): 80 TABLET, FILM COATED ORAL at 21:13

## 2022-01-01 RX ADMIN — PANTOPRAZOLE SODIUM 40 MILLIGRAM(S): 20 TABLET, DELAYED RELEASE ORAL at 05:36

## 2022-01-01 RX ADMIN — CHLORHEXIDINE GLUCONATE 1 APPLICATION(S): 213 SOLUTION TOPICAL at 05:15

## 2022-01-01 RX ADMIN — Medication 650 MILLIGRAM(S): at 00:45

## 2022-01-01 RX ADMIN — DESMOPRESSIN ACETATE 219 MICROGRAM(S): 0.1 TABLET ORAL at 16:48

## 2022-01-01 RX ADMIN — INSULIN GLARGINE 8 UNIT(S): 100 INJECTION, SOLUTION SUBCUTANEOUS at 23:03

## 2022-01-01 RX ADMIN — Medication 50 MILLIGRAM(S): at 16:58

## 2022-01-01 RX ADMIN — Medication 25 MILLIGRAM(S): at 05:16

## 2022-01-01 RX ADMIN — Medication 25 MILLIGRAM(S): at 05:24

## 2022-01-01 RX ADMIN — ERYTHROPOIETIN 10000 UNIT(S): 10000 INJECTION, SOLUTION INTRAVENOUS; SUBCUTANEOUS at 14:35

## 2022-01-01 RX ADMIN — Medication 1 DROP(S): at 05:39

## 2022-01-01 RX ADMIN — Medication 2: at 17:47

## 2022-01-01 RX ADMIN — Medication 25 MILLIGRAM(S): at 05:39

## 2022-01-01 RX ADMIN — ATORVASTATIN CALCIUM 40 MILLIGRAM(S): 80 TABLET, FILM COATED ORAL at 22:05

## 2022-01-01 RX ADMIN — Medication 81 MILLIGRAM(S): at 15:04

## 2022-01-01 RX ADMIN — INSULIN HUMAN 2 UNIT(S)/HR: 100 INJECTION, SOLUTION SUBCUTANEOUS at 03:38

## 2022-01-01 RX ADMIN — PANTOPRAZOLE SODIUM 40 MILLIGRAM(S): 20 TABLET, DELAYED RELEASE ORAL at 05:11

## 2022-01-01 RX ADMIN — ATORVASTATIN CALCIUM 40 MILLIGRAM(S): 80 TABLET, FILM COATED ORAL at 22:10

## 2022-01-01 RX ADMIN — PANTOPRAZOLE SODIUM 40 MILLIGRAM(S): 20 TABLET, DELAYED RELEASE ORAL at 05:40

## 2022-01-01 RX ADMIN — PIPERACILLIN AND TAZOBACTAM 25 GRAM(S): 4; .5 INJECTION, POWDER, LYOPHILIZED, FOR SOLUTION INTRAVENOUS at 17:31

## 2022-01-01 RX ADMIN — Medication 75 MICROGRAM(S): at 05:20

## 2022-01-01 RX ADMIN — Medication 50 MILLIGRAM(S): at 21:27

## 2022-01-01 RX ADMIN — Medication 50 MICROGRAM(S): at 07:12

## 2022-01-01 RX ADMIN — ATORVASTATIN CALCIUM 40 MILLIGRAM(S): 80 TABLET, FILM COATED ORAL at 23:31

## 2022-01-01 RX ADMIN — Medication 50 MILLIGRAM(S): at 05:04

## 2022-01-01 RX ADMIN — NICARDIPINE HYDROCHLORIDE 25 MG/HR: 30 CAPSULE, EXTENDED RELEASE ORAL at 20:52

## 2022-01-01 RX ADMIN — ATORVASTATIN CALCIUM 40 MILLIGRAM(S): 80 TABLET, FILM COATED ORAL at 21:00

## 2022-01-01 RX ADMIN — HEPARIN SODIUM 5000 UNIT(S): 5000 INJECTION INTRAVENOUS; SUBCUTANEOUS at 17:12

## 2022-01-01 RX ADMIN — PIPERACILLIN AND TAZOBACTAM 200 GRAM(S): 4; .5 INJECTION, POWDER, LYOPHILIZED, FOR SOLUTION INTRAVENOUS at 23:00

## 2022-01-01 RX ADMIN — Medication 650 MILLIGRAM(S): at 19:00

## 2022-01-01 RX ADMIN — Medication 1 GRAM(S): at 01:53

## 2022-01-01 RX ADMIN — PIPERACILLIN AND TAZOBACTAM 200 GRAM(S): 4; .5 INJECTION, POWDER, LYOPHILIZED, FOR SOLUTION INTRAVENOUS at 13:57

## 2022-01-01 RX ADMIN — Medication 650 MILLIGRAM(S): at 04:22

## 2022-01-01 RX ADMIN — ATORVASTATIN CALCIUM 40 MILLIGRAM(S): 80 TABLET, FILM COATED ORAL at 21:44

## 2022-01-01 RX ADMIN — PIPERACILLIN AND TAZOBACTAM 25 GRAM(S): 4; .5 INJECTION, POWDER, LYOPHILIZED, FOR SOLUTION INTRAVENOUS at 16:54

## 2022-01-01 RX ADMIN — Medication 667 MILLIGRAM(S): at 09:38

## 2022-01-01 RX ADMIN — Medication 50 MICROGRAM(S): at 05:51

## 2022-01-01 RX ADMIN — PIPERACILLIN AND TAZOBACTAM 25 GRAM(S): 4; .5 INJECTION, POWDER, LYOPHILIZED, FOR SOLUTION INTRAVENOUS at 23:21

## 2022-01-01 RX ADMIN — PIPERACILLIN AND TAZOBACTAM 25 GRAM(S): 4; .5 INJECTION, POWDER, LYOPHILIZED, FOR SOLUTION INTRAVENOUS at 13:22

## 2022-01-01 RX ADMIN — Medication 250 MILLIGRAM(S): at 00:20

## 2022-01-01 RX ADMIN — Medication 25 MILLIGRAM(S): at 07:12

## 2022-01-01 RX ADMIN — CHLORHEXIDINE GLUCONATE 1 APPLICATION(S): 213 SOLUTION TOPICAL at 05:21

## 2022-01-01 RX ADMIN — PIPERACILLIN AND TAZOBACTAM 25 GRAM(S): 4; .5 INJECTION, POWDER, LYOPHILIZED, FOR SOLUTION INTRAVENOUS at 21:08

## 2022-01-01 RX ADMIN — Medication 2: at 11:19

## 2022-01-01 RX ADMIN — Medication 100 MILLIGRAM(S): at 06:28

## 2022-01-01 RX ADMIN — HEPARIN SODIUM 5000 UNIT(S): 5000 INJECTION INTRAVENOUS; SUBCUTANEOUS at 18:28

## 2022-01-01 RX ADMIN — Medication 2: at 17:36

## 2022-01-01 RX ADMIN — HEPARIN SODIUM 5000 UNIT(S): 5000 INJECTION INTRAVENOUS; SUBCUTANEOUS at 17:25

## 2022-01-01 RX ADMIN — CHLORHEXIDINE GLUCONATE 1 APPLICATION(S): 213 SOLUTION TOPICAL at 12:05

## 2022-01-01 RX ADMIN — Medication 6: at 00:40

## 2022-01-01 RX ADMIN — Medication 1 DROP(S): at 05:05

## 2022-01-01 RX ADMIN — Medication 667 MILLIGRAM(S): at 15:08

## 2022-01-01 RX ADMIN — ERYTHROPOIETIN 10000 UNIT(S): 10000 INJECTION, SOLUTION INTRAVENOUS; SUBCUTANEOUS at 10:49

## 2022-01-01 RX ADMIN — CEFTRIAXONE 100 MILLIGRAM(S): 500 INJECTION, POWDER, FOR SOLUTION INTRAMUSCULAR; INTRAVENOUS at 06:57

## 2022-01-01 RX ADMIN — Medication 25 MILLIGRAM(S): at 13:28

## 2022-01-01 RX ADMIN — AMLODIPINE BESYLATE 10 MILLIGRAM(S): 2.5 TABLET ORAL at 15:50

## 2022-01-01 RX ADMIN — Medication 50 MILLIGRAM(S): at 15:05

## 2022-01-01 RX ADMIN — PIPERACILLIN AND TAZOBACTAM 25 GRAM(S): 4; .5 INJECTION, POWDER, LYOPHILIZED, FOR SOLUTION INTRAVENOUS at 05:40

## 2022-01-01 RX ADMIN — Medication 20 MILLIGRAM(S): at 01:41

## 2022-01-01 RX ADMIN — CHLORHEXIDINE GLUCONATE 15 MILLILITER(S): 213 SOLUTION TOPICAL at 17:50

## 2022-01-01 RX ADMIN — Medication 667 MILLIGRAM(S): at 11:44

## 2022-01-01 RX ADMIN — PANTOPRAZOLE SODIUM 40 MILLIGRAM(S): 20 TABLET, DELAYED RELEASE ORAL at 06:28

## 2022-01-01 RX ADMIN — Medication 25 MILLIGRAM(S): at 05:01

## 2022-01-01 RX ADMIN — Medication 50 MILLIGRAM(S): at 13:26

## 2022-01-01 RX ADMIN — Medication 2: at 18:14

## 2022-01-01 RX ADMIN — Medication 2: at 05:20

## 2022-01-01 RX ADMIN — CHLORHEXIDINE GLUCONATE 1 APPLICATION(S): 213 SOLUTION TOPICAL at 05:40

## 2022-01-01 RX ADMIN — AMLODIPINE BESYLATE 10 MILLIGRAM(S): 2.5 TABLET ORAL at 05:31

## 2022-01-01 RX ADMIN — Medication 4: at 07:36

## 2022-01-01 RX ADMIN — HEPARIN SODIUM 5000 UNIT(S): 5000 INJECTION INTRAVENOUS; SUBCUTANEOUS at 18:04

## 2022-01-01 RX ADMIN — Medication 50 MICROGRAM(S): at 05:11

## 2022-01-01 RX ADMIN — Medication 50 MILLIGRAM(S): at 18:28

## 2022-01-01 RX ADMIN — HEPARIN SODIUM 5000 UNIT(S): 5000 INJECTION INTRAVENOUS; SUBCUTANEOUS at 17:37

## 2022-01-01 RX ADMIN — Medication 50 MILLIGRAM(S): at 07:11

## 2022-01-01 RX ADMIN — SODIUM ZIRCONIUM CYCLOSILICATE 5 GRAM(S): 10 POWDER, FOR SUSPENSION ORAL at 12:12

## 2022-01-01 RX ADMIN — Medication 2: at 18:11

## 2022-01-01 RX ADMIN — CHLORHEXIDINE GLUCONATE 15 MILLILITER(S): 213 SOLUTION TOPICAL at 05:05

## 2022-01-01 RX ADMIN — Medication 2: at 10:59

## 2022-01-01 RX ADMIN — Medication 667 MILLIGRAM(S): at 11:24

## 2022-01-01 RX ADMIN — Medication 25 MILLIGRAM(S): at 13:15

## 2022-01-01 RX ADMIN — Medication 25 MILLIGRAM(S): at 05:51

## 2022-01-01 RX ADMIN — PANTOPRAZOLE SODIUM 40 MILLIGRAM(S): 20 TABLET, DELAYED RELEASE ORAL at 06:52

## 2022-01-01 RX ADMIN — Medication 1 DROP(S): at 05:18

## 2022-01-01 RX ADMIN — ATORVASTATIN CALCIUM 40 MILLIGRAM(S): 80 TABLET, FILM COATED ORAL at 21:33

## 2022-01-01 RX ADMIN — Medication 667 MILLIGRAM(S): at 16:43

## 2022-01-01 RX ADMIN — PANTOPRAZOLE SODIUM 40 MILLIGRAM(S): 20 TABLET, DELAYED RELEASE ORAL at 05:34

## 2022-01-01 RX ADMIN — INSULIN HUMAN 2 UNIT(S)/HR: 100 INJECTION, SOLUTION SUBCUTANEOUS at 22:26

## 2022-01-01 RX ADMIN — CHLORHEXIDINE GLUCONATE 15 MILLILITER(S): 213 SOLUTION TOPICAL at 17:47

## 2022-01-01 RX ADMIN — Medication 75 MICROGRAM(S): at 05:27

## 2022-01-01 RX ADMIN — Medication 1000 MILLIGRAM(S): at 14:38

## 2022-01-01 RX ADMIN — Medication 100 MILLIEQUIVALENT(S): at 06:45

## 2022-01-01 RX ADMIN — PIPERACILLIN AND TAZOBACTAM 200 GRAM(S): 4; .5 INJECTION, POWDER, LYOPHILIZED, FOR SOLUTION INTRAVENOUS at 14:23

## 2022-01-01 RX ADMIN — PIPERACILLIN AND TAZOBACTAM 25 GRAM(S): 4; .5 INJECTION, POWDER, LYOPHILIZED, FOR SOLUTION INTRAVENOUS at 05:51

## 2022-01-01 RX ADMIN — PIPERACILLIN AND TAZOBACTAM 25 GRAM(S): 4; .5 INJECTION, POWDER, LYOPHILIZED, FOR SOLUTION INTRAVENOUS at 16:37

## 2022-01-01 RX ADMIN — FENTANYL CITRATE 25 MICROGRAM(S): 50 INJECTION INTRAVENOUS at 05:20

## 2022-01-01 RX ADMIN — Medication 75 MICROGRAM(S): at 05:19

## 2022-01-01 RX ADMIN — NICARDIPINE HYDROCHLORIDE 25 MG/HR: 30 CAPSULE, EXTENDED RELEASE ORAL at 12:05

## 2022-01-01 RX ADMIN — Medication 650 MILLIGRAM(S): at 03:51

## 2022-01-01 RX ADMIN — Medication 75 MICROGRAM(S): at 05:18

## 2022-01-01 RX ADMIN — HEPARIN SODIUM 5000 UNIT(S): 5000 INJECTION INTRAVENOUS; SUBCUTANEOUS at 05:05

## 2022-01-01 RX ADMIN — INSULIN GLARGINE 13 UNIT(S): 100 INJECTION, SOLUTION SUBCUTANEOUS at 21:00

## 2022-01-01 RX ADMIN — INSULIN GLARGINE 13 UNIT(S): 100 INJECTION, SOLUTION SUBCUTANEOUS at 23:18

## 2022-01-01 RX ADMIN — ATORVASTATIN CALCIUM 40 MILLIGRAM(S): 80 TABLET, FILM COATED ORAL at 21:04

## 2022-01-01 RX ADMIN — CHLORHEXIDINE GLUCONATE 15 MILLILITER(S): 213 SOLUTION TOPICAL at 05:01

## 2022-01-01 RX ADMIN — PANTOPRAZOLE SODIUM 40 MILLIGRAM(S): 20 TABLET, DELAYED RELEASE ORAL at 05:18

## 2022-01-01 RX ADMIN — HEPARIN SODIUM 5000 UNIT(S): 5000 INJECTION INTRAVENOUS; SUBCUTANEOUS at 18:20

## 2022-01-01 RX ADMIN — Medication 2: at 00:19

## 2022-01-01 RX ADMIN — AMLODIPINE BESYLATE 10 MILLIGRAM(S): 2.5 TABLET ORAL at 05:52

## 2022-01-01 RX ADMIN — Medication 667 MILLIGRAM(S): at 18:12

## 2022-01-01 RX ADMIN — Medication 25 MILLIGRAM(S): at 17:36

## 2022-01-01 RX ADMIN — Medication 25 MILLIGRAM(S): at 13:17

## 2022-01-01 RX ADMIN — INSULIN HUMAN 10 UNIT(S): 100 INJECTION, SOLUTION SUBCUTANEOUS at 16:51

## 2022-01-01 RX ADMIN — Medication 1 DROP(S): at 05:31

## 2022-01-01 RX ADMIN — Medication 75 MICROGRAM(S): at 05:16

## 2022-01-01 RX ADMIN — ONDANSETRON 4 MILLIGRAM(S): 8 TABLET, FILM COATED ORAL at 19:52

## 2022-01-01 RX ADMIN — FENTANYL CITRATE 25 MICROGRAM(S): 50 INJECTION INTRAVENOUS at 12:00

## 2022-01-01 RX ADMIN — Medication 1 DROP(S): at 17:57

## 2022-01-01 RX ADMIN — NICARDIPINE HYDROCHLORIDE 25 MG/HR: 30 CAPSULE, EXTENDED RELEASE ORAL at 13:16

## 2022-01-01 RX ADMIN — LEVETIRACETAM 400 MILLIGRAM(S): 250 TABLET, FILM COATED ORAL at 15:04

## 2022-01-01 RX ADMIN — Medication 667 MILLIGRAM(S): at 08:10

## 2022-01-01 RX ADMIN — AMLODIPINE BESYLATE 10 MILLIGRAM(S): 2.5 TABLET ORAL at 05:23

## 2022-01-01 RX ADMIN — Medication 25 MILLIGRAM(S): at 18:28

## 2022-01-01 RX ADMIN — PIPERACILLIN AND TAZOBACTAM 25 GRAM(S): 4; .5 INJECTION, POWDER, LYOPHILIZED, FOR SOLUTION INTRAVENOUS at 22:06

## 2022-01-01 RX ADMIN — Medication 1 DROP(S): at 17:24

## 2022-01-01 RX ADMIN — Medication 25 MILLIGRAM(S): at 21:12

## 2022-01-01 RX ADMIN — PIPERACILLIN AND TAZOBACTAM 25 GRAM(S): 4; .5 INJECTION, POWDER, LYOPHILIZED, FOR SOLUTION INTRAVENOUS at 02:20

## 2022-01-01 RX ADMIN — Medication 25 MILLIGRAM(S): at 17:25

## 2022-01-01 RX ADMIN — Medication 667 MILLIGRAM(S): at 07:36

## 2022-01-01 RX ADMIN — Medication 8: at 23:40

## 2022-01-01 RX ADMIN — PANTOPRAZOLE SODIUM 40 MILLIGRAM(S): 20 TABLET, DELAYED RELEASE ORAL at 05:04

## 2022-01-01 RX ADMIN — Medication 100 MILLIEQUIVALENT(S): at 07:43

## 2022-01-01 RX ADMIN — Medication 667 MILLIGRAM(S): at 18:04

## 2022-01-01 RX ADMIN — PANTOPRAZOLE SODIUM 40 MILLIGRAM(S): 20 TABLET, DELAYED RELEASE ORAL at 05:23

## 2022-01-01 RX ADMIN — CHLORHEXIDINE GLUCONATE 1 APPLICATION(S): 213 SOLUTION TOPICAL at 05:19

## 2022-01-01 RX ADMIN — PIPERACILLIN AND TAZOBACTAM 25 GRAM(S): 4; .5 INJECTION, POWDER, LYOPHILIZED, FOR SOLUTION INTRAVENOUS at 05:26

## 2022-01-01 RX ADMIN — DESMOPRESSIN ACETATE 228 MICROGRAM(S): 0.1 TABLET ORAL at 18:03

## 2022-01-01 RX ADMIN — CHLORHEXIDINE GLUCONATE 1 APPLICATION(S): 213 SOLUTION TOPICAL at 05:25

## 2022-01-01 RX ADMIN — Medication 50 MILLIGRAM(S): at 05:12

## 2022-01-01 RX ADMIN — Medication 4: at 17:22

## 2022-01-01 RX ADMIN — PIPERACILLIN AND TAZOBACTAM 25 GRAM(S): 4; .5 INJECTION, POWDER, LYOPHILIZED, FOR SOLUTION INTRAVENOUS at 21:02

## 2022-01-01 RX ADMIN — Medication 400 MILLIGRAM(S): at 14:23

## 2022-01-01 RX ADMIN — Medication 50 MICROGRAM(S): at 05:04

## 2022-01-01 RX ADMIN — SODIUM CHLORIDE 500 MILLILITER(S): 9 INJECTION INTRAMUSCULAR; INTRAVENOUS; SUBCUTANEOUS at 21:07

## 2022-01-01 RX ADMIN — INSULIN GLARGINE 13 UNIT(S): 100 INJECTION, SOLUTION SUBCUTANEOUS at 23:11

## 2022-01-01 RX ADMIN — CHLORHEXIDINE GLUCONATE 1 APPLICATION(S): 213 SOLUTION TOPICAL at 11:07

## 2022-01-01 RX ADMIN — CHLORHEXIDINE GLUCONATE 15 MILLILITER(S): 213 SOLUTION TOPICAL at 05:20

## 2022-01-01 RX ADMIN — Medication 2: at 16:56

## 2022-01-01 RX ADMIN — PIPERACILLIN AND TAZOBACTAM 25 GRAM(S): 4; .5 INJECTION, POWDER, LYOPHILIZED, FOR SOLUTION INTRAVENOUS at 05:03

## 2022-01-01 RX ADMIN — FENTANYL CITRATE 25 MICROGRAM(S): 50 INJECTION INTRAVENOUS at 05:35

## 2022-01-01 RX ADMIN — NICARDIPINE HYDROCHLORIDE 25 MG/HR: 30 CAPSULE, EXTENDED RELEASE ORAL at 06:14

## 2022-01-01 RX ADMIN — ADENOSINE 6 MILLIGRAM(S): 3 INJECTION INTRAVENOUS at 00:00

## 2022-01-01 RX ADMIN — CHLORHEXIDINE GLUCONATE 15 MILLILITER(S): 213 SOLUTION TOPICAL at 05:18

## 2022-01-01 RX ADMIN — Medication 667 MILLIGRAM(S): at 08:04

## 2022-01-01 RX ADMIN — HEPARIN SODIUM 5000 UNIT(S): 5000 INJECTION INTRAVENOUS; SUBCUTANEOUS at 17:40

## 2022-01-01 RX ADMIN — Medication 667 MILLIGRAM(S): at 18:28

## 2022-01-01 RX ADMIN — Medication 50 MILLIGRAM(S): at 05:22

## 2022-01-01 RX ADMIN — ATORVASTATIN CALCIUM 40 MILLIGRAM(S): 80 TABLET, FILM COATED ORAL at 21:48

## 2022-01-01 RX ADMIN — Medication 25 MILLIGRAM(S): at 23:31

## 2022-01-01 RX ADMIN — Medication 25 MILLIGRAM(S): at 17:23

## 2022-01-01 RX ADMIN — Medication 25 MILLIGRAM(S): at 22:10

## 2022-01-01 RX ADMIN — PANTOPRAZOLE SODIUM 40 MILLIGRAM(S): 20 TABLET, DELAYED RELEASE ORAL at 05:51

## 2022-01-01 RX ADMIN — HEPARIN SODIUM 5000 UNIT(S): 5000 INJECTION INTRAVENOUS; SUBCUTANEOUS at 16:57

## 2022-01-01 RX ADMIN — Medication 2: at 07:20

## 2022-01-01 RX ADMIN — ERYTHROPOIETIN 10000 UNIT(S): 10000 INJECTION, SOLUTION INTRAVENOUS; SUBCUTANEOUS at 10:16

## 2022-01-01 RX ADMIN — Medication 50 MILLIGRAM(S): at 05:27

## 2022-01-01 RX ADMIN — Medication 50 MILLIGRAM(S): at 17:47

## 2022-01-01 RX ADMIN — Medication 50 MICROGRAM(S): at 05:31

## 2022-01-01 RX ADMIN — HEPARIN SODIUM 5000 UNIT(S): 5000 INJECTION INTRAVENOUS; SUBCUTANEOUS at 05:36

## 2022-01-01 RX ADMIN — Medication 2: at 17:08

## 2022-01-01 RX ADMIN — Medication 50 MILLIGRAM(S): at 17:18

## 2022-01-01 RX ADMIN — Medication 667 MILLIGRAM(S): at 11:05

## 2022-01-01 RX ADMIN — Medication 2: at 11:53

## 2022-01-01 RX ADMIN — Medication 25 MILLIGRAM(S): at 17:31

## 2022-01-01 RX ADMIN — AMLODIPINE BESYLATE 10 MILLIGRAM(S): 2.5 TABLET ORAL at 05:11

## 2022-01-01 RX ADMIN — PANTOPRAZOLE SODIUM 40 MILLIGRAM(S): 20 TABLET, DELAYED RELEASE ORAL at 09:58

## 2022-01-01 RX ADMIN — INSULIN GLARGINE 10 UNIT(S): 100 INJECTION, SOLUTION SUBCUTANEOUS at 23:36

## 2022-01-01 RX ADMIN — Medication 10 MILLIGRAM(S): at 08:37

## 2022-01-01 RX ADMIN — Medication 10 MILLIGRAM(S): at 09:29

## 2022-01-01 RX ADMIN — Medication 667 MILLIGRAM(S): at 08:13

## 2022-01-01 RX ADMIN — ATORVASTATIN CALCIUM 40 MILLIGRAM(S): 80 TABLET, FILM COATED ORAL at 21:09

## 2022-01-01 RX ADMIN — Medication 100 MILLIEQUIVALENT(S): at 08:54

## 2022-01-01 RX ADMIN — Medication 25 MILLIGRAM(S): at 05:33

## 2022-01-01 RX ADMIN — SODIUM CHLORIDE 3 MILLILITER(S): 9 INJECTION INTRAMUSCULAR; INTRAVENOUS; SUBCUTANEOUS at 23:01

## 2022-01-01 RX ADMIN — AMLODIPINE BESYLATE 10 MILLIGRAM(S): 2.5 TABLET ORAL at 05:27

## 2022-01-01 RX ADMIN — HEPARIN SODIUM 5000 UNIT(S): 5000 INJECTION INTRAVENOUS; SUBCUTANEOUS at 17:24

## 2022-01-01 RX ADMIN — Medication 50 MILLIGRAM(S): at 05:51

## 2022-01-01 RX ADMIN — Medication 75 MICROGRAM(S): at 05:48

## 2022-01-01 RX ADMIN — Medication 1 DROP(S): at 17:52

## 2022-01-01 RX ADMIN — CHLORHEXIDINE GLUCONATE 1 APPLICATION(S): 213 SOLUTION TOPICAL at 13:16

## 2022-01-01 RX ADMIN — Medication 1 DROP(S): at 17:38

## 2022-01-01 RX ADMIN — Medication 2: at 23:04

## 2022-01-01 RX ADMIN — MAGNESIUM OXIDE 400 MG ORAL TABLET 400 MILLIGRAM(S): 241.3 TABLET ORAL at 11:46

## 2022-01-01 RX ADMIN — ONDANSETRON 4 MILLIGRAM(S): 8 TABLET, FILM COATED ORAL at 01:53

## 2022-01-01 RX ADMIN — Medication 667 MILLIGRAM(S): at 11:46

## 2022-01-01 RX ADMIN — PANTOPRAZOLE SODIUM 40 MILLIGRAM(S): 20 TABLET, DELAYED RELEASE ORAL at 12:34

## 2022-01-01 RX ADMIN — PANTOPRAZOLE SODIUM 40 MILLIGRAM(S): 20 TABLET, DELAYED RELEASE ORAL at 07:11

## 2022-01-01 RX ADMIN — Medication 2: at 05:21

## 2022-01-01 RX ADMIN — Medication 25 MILLIGRAM(S): at 00:45

## 2022-01-01 RX ADMIN — Medication 25 MILLIGRAM(S): at 17:53

## 2022-01-01 RX ADMIN — DESMOPRESSIN ACETATE 220 MICROGRAM(S): 0.1 TABLET ORAL at 04:05

## 2022-01-01 RX ADMIN — CEFTRIAXONE 2000 MILLIGRAM(S): 500 INJECTION, POWDER, FOR SOLUTION INTRAMUSCULAR; INTRAVENOUS at 09:57

## 2022-01-01 RX ADMIN — CHLORHEXIDINE GLUCONATE 15 MILLILITER(S): 213 SOLUTION TOPICAL at 05:25

## 2022-01-01 RX ADMIN — Medication 650 MILLIGRAM(S): at 18:00

## 2022-01-01 RX ADMIN — INSULIN GLARGINE 8 UNIT(S): 100 INJECTION, SOLUTION SUBCUTANEOUS at 22:28

## 2022-01-01 RX ADMIN — Medication 4: at 18:23

## 2022-01-01 RX ADMIN — Medication 4: at 11:49

## 2022-01-01 RX ADMIN — INSULIN GLARGINE 13 UNIT(S): 100 INJECTION, SOLUTION SUBCUTANEOUS at 21:30

## 2022-01-01 RX ADMIN — HEPARIN SODIUM 5000 UNIT(S): 5000 INJECTION INTRAVENOUS; SUBCUTANEOUS at 05:52

## 2022-01-01 RX ADMIN — Medication 650 MILLIGRAM(S): at 13:54

## 2022-01-01 RX ADMIN — AMLODIPINE BESYLATE 10 MILLIGRAM(S): 2.5 TABLET ORAL at 06:28

## 2022-01-01 RX ADMIN — PIPERACILLIN AND TAZOBACTAM 25 GRAM(S): 4; .5 INJECTION, POWDER, LYOPHILIZED, FOR SOLUTION INTRAVENOUS at 18:27

## 2022-01-01 RX ADMIN — Medication 50 MILLIGRAM(S): at 17:53

## 2022-01-01 RX ADMIN — Medication 25 MILLIGRAM(S): at 05:11

## 2022-01-01 RX ADMIN — Medication 667 MILLIGRAM(S): at 22:10

## 2022-01-01 RX ADMIN — Medication 100 MILLIGRAM(S): at 15:50

## 2022-01-01 RX ADMIN — CHLORHEXIDINE GLUCONATE 15 MILLILITER(S): 213 SOLUTION TOPICAL at 17:32

## 2022-01-01 RX ADMIN — Medication 50 MILLIGRAM(S): at 05:34

## 2022-01-01 RX ADMIN — MAGNESIUM OXIDE 400 MG ORAL TABLET 400 MILLIGRAM(S): 241.3 TABLET ORAL at 09:38

## 2022-01-01 RX ADMIN — PANTOPRAZOLE SODIUM 40 MILLIGRAM(S): 20 TABLET, DELAYED RELEASE ORAL at 05:20

## 2022-01-01 RX ADMIN — Medication 4: at 23:46

## 2022-01-01 RX ADMIN — Medication 650 MILLIGRAM(S): at 05:11

## 2022-01-01 RX ADMIN — FENTANYL CITRATE 25 MICROGRAM(S): 50 INJECTION INTRAVENOUS at 10:14

## 2022-01-01 RX ADMIN — Medication 75 MICROGRAM(S): at 05:38

## 2022-01-01 RX ADMIN — Medication 75 MICROGRAM(S): at 05:31

## 2022-01-01 RX ADMIN — ONDANSETRON 4 MILLIGRAM(S): 8 TABLET, FILM COATED ORAL at 04:01

## 2022-01-01 RX ADMIN — PIPERACILLIN AND TAZOBACTAM 25 GRAM(S): 4; .5 INJECTION, POWDER, LYOPHILIZED, FOR SOLUTION INTRAVENOUS at 03:57

## 2022-01-01 RX ADMIN — MORPHINE SULFATE 4 MILLIGRAM(S): 50 CAPSULE, EXTENDED RELEASE ORAL at 19:52

## 2022-01-01 RX ADMIN — Medication 100 MILLIGRAM(S): at 18:03

## 2022-01-01 RX ADMIN — Medication 2: at 00:48

## 2022-01-01 RX ADMIN — Medication 4: at 11:22

## 2022-01-01 RX ADMIN — Medication 25 MILLIGRAM(S): at 05:23

## 2022-01-01 RX ADMIN — Medication 667 MILLIGRAM(S): at 17:12

## 2022-01-01 RX ADMIN — Medication 2: at 17:54

## 2022-01-01 RX ADMIN — PIPERACILLIN AND TAZOBACTAM 25 GRAM(S): 4; .5 INJECTION, POWDER, LYOPHILIZED, FOR SOLUTION INTRAVENOUS at 17:49

## 2022-01-01 RX ADMIN — PIPERACILLIN AND TAZOBACTAM 25 GRAM(S): 4; .5 INJECTION, POWDER, LYOPHILIZED, FOR SOLUTION INTRAVENOUS at 13:26

## 2022-01-01 RX ADMIN — Medication 25 MILLIGRAM(S): at 05:04

## 2022-01-01 RX ADMIN — MORPHINE SULFATE 4 MILLIGRAM(S): 50 CAPSULE, EXTENDED RELEASE ORAL at 21:07

## 2022-01-01 RX ADMIN — Medication 25 MILLIGRAM(S): at 17:37

## 2022-01-01 RX ADMIN — Medication 667 MILLIGRAM(S): at 16:57

## 2022-01-01 RX ADMIN — Medication 100 MILLIGRAM(S): at 05:31

## 2022-01-01 RX ADMIN — Medication 4: at 05:56

## 2022-01-01 RX ADMIN — CHLORHEXIDINE GLUCONATE 15 MILLILITER(S): 213 SOLUTION TOPICAL at 17:35

## 2022-01-01 RX ADMIN — Medication 667 MILLIGRAM(S): at 12:23

## 2022-01-01 RX ADMIN — Medication 50 MILLIGRAM(S): at 17:40

## 2022-01-01 RX ADMIN — PIPERACILLIN AND TAZOBACTAM 25 GRAM(S): 4; .5 INJECTION, POWDER, LYOPHILIZED, FOR SOLUTION INTRAVENOUS at 06:28

## 2022-01-01 RX ADMIN — Medication 2: at 17:23

## 2022-01-01 RX ADMIN — CHLORHEXIDINE GLUCONATE 15 MILLILITER(S): 213 SOLUTION TOPICAL at 05:31

## 2022-01-01 RX ADMIN — Medication 1000 MILLILITER(S): at 05:33

## 2022-01-01 RX ADMIN — FENTANYL CITRATE 25 MICROGRAM(S): 50 INJECTION INTRAVENOUS at 12:15

## 2022-01-01 RX ADMIN — CHLORHEXIDINE GLUCONATE 1 APPLICATION(S): 213 SOLUTION TOPICAL at 11:21

## 2022-01-01 RX ADMIN — Medication 250 MILLIGRAM(S): at 14:39

## 2022-01-01 RX ADMIN — HEPARIN SODIUM 5000 UNIT(S): 5000 INJECTION INTRAVENOUS; SUBCUTANEOUS at 05:24

## 2022-01-01 RX ADMIN — HYDROMORPHONE HYDROCHLORIDE 1 MILLIGRAM(S): 2 INJECTION INTRAMUSCULAR; INTRAVENOUS; SUBCUTANEOUS at 14:32

## 2022-01-01 RX ADMIN — HEPARIN SODIUM 5000 UNIT(S): 5000 INJECTION INTRAVENOUS; SUBCUTANEOUS at 05:16

## 2022-01-01 RX ADMIN — HEPARIN SODIUM 5000 UNIT(S): 5000 INJECTION INTRAVENOUS; SUBCUTANEOUS at 05:20

## 2022-01-01 RX ADMIN — AMLODIPINE BESYLATE 10 MILLIGRAM(S): 2.5 TABLET ORAL at 07:11

## 2022-01-01 RX ADMIN — Medication 2: at 23:12

## 2022-01-01 RX ADMIN — ONDANSETRON 4 MILLIGRAM(S): 8 TABLET, FILM COATED ORAL at 23:06

## 2022-01-01 RX ADMIN — Medication 650 MILLIGRAM(S): at 04:50

## 2022-01-01 RX ADMIN — Medication 50 MILLIGRAM(S): at 17:12

## 2022-01-01 RX ADMIN — ATORVASTATIN CALCIUM 40 MILLIGRAM(S): 80 TABLET, FILM COATED ORAL at 22:34

## 2022-01-01 RX ADMIN — CHLORHEXIDINE GLUCONATE 15 MILLILITER(S): 213 SOLUTION TOPICAL at 18:23

## 2022-01-01 RX ADMIN — CHLORHEXIDINE GLUCONATE 15 MILLILITER(S): 213 SOLUTION TOPICAL at 05:42

## 2022-01-01 RX ADMIN — HEPARIN SODIUM 5000 UNIT(S): 5000 INJECTION INTRAVENOUS; SUBCUTANEOUS at 06:28

## 2022-01-01 RX ADMIN — Medication 2: at 11:44

## 2022-01-01 RX ADMIN — Medication 50 MICROGRAM(S): at 05:33

## 2022-01-01 RX ADMIN — PIPERACILLIN AND TAZOBACTAM 25 GRAM(S): 4; .5 INJECTION, POWDER, LYOPHILIZED, FOR SOLUTION INTRAVENOUS at 15:04

## 2022-01-01 RX ADMIN — Medication 650 MILLIGRAM(S): at 23:45

## 2022-01-01 RX ADMIN — Medication 2: at 00:28

## 2022-01-01 RX ADMIN — CHLORHEXIDINE GLUCONATE 1 APPLICATION(S): 213 SOLUTION TOPICAL at 11:49

## 2022-01-01 RX ADMIN — Medication 81 MILLIGRAM(S): at 11:04

## 2022-01-01 RX ADMIN — FENTANYL CITRATE 25 MICROGRAM(S): 50 INJECTION INTRAVENOUS at 19:00

## 2022-01-01 RX ADMIN — AMLODIPINE BESYLATE 10 MILLIGRAM(S): 2.5 TABLET ORAL at 05:01

## 2022-01-01 RX ADMIN — CHLORHEXIDINE GLUCONATE 1 APPLICATION(S): 213 SOLUTION TOPICAL at 12:02

## 2022-01-01 RX ADMIN — SODIUM CHLORIDE 50 MILLILITER(S): 5 INJECTION, SOLUTION INTRAVENOUS at 06:46

## 2022-01-01 RX ADMIN — Medication 50 MILLIGRAM(S): at 05:29

## 2022-01-01 RX ADMIN — Medication 30 MILLILITER(S): at 19:52

## 2022-01-01 RX ADMIN — PANTOPRAZOLE SODIUM 40 MILLIGRAM(S): 20 TABLET, DELAYED RELEASE ORAL at 05:01

## 2022-01-01 RX ADMIN — Medication 25 MILLIGRAM(S): at 13:16

## 2022-01-01 RX ADMIN — Medication 75 MICROGRAM(S): at 05:02

## 2022-01-01 RX ADMIN — Medication 50 MILLIGRAM(S): at 06:28

## 2022-01-01 NOTE — PATIENT PROFILE ADULT - FUNCTIONAL ASSESSMENT - BASIC MOBILITY 5.
Site: Forehead (08 Nov 2022 01:00)  Bilirubin: 10.1 (08 Nov 2022 01:00)  Bilirubin Comment: At 24 hours of life Tc Bili is 10.1 with a PT of 12.8 (08 Nov 2022 01:00)  
3 = A little assistance

## 2022-01-01 NOTE — ED ADULT NURSE NOTE - PRO INTERPRETER NEED 2
This note was copied from the mother's chart.  Lactation Consult Note    Evaluation Completed: 2022 19:07 EDT  Patient Name: Leola Warren  :  1991  MRN:  [id number]     REFERRAL  INFORMATION:                          Date of Referral: 22   Person Making Referral: lactation consultant  Maternal Reason for Referral: no prior breastfeeding experience, latch difficulty  Infant Reason for Referral: sleepy, disinterest in latch    DELIVERY HISTORY:        Skin to skin initiation date/time: 2022  2:50 PM   Skin to skin end date/time: 2022  4:20 PM        MATERNAL ASSESSMENT:     Breast Shape: round (22)  Breast Density: soft (22)  Areola: elastic (22)  Nipples: everted (22)     Left Nipple Symptoms: intact (22)  Right Nipple Symptoms: bruised (22)       INFANT ASSESSMENT:  Information for the patient's :  Yessi WarrenLeandro [[id number]]   No past medical history on file.                                                                                                     MATERNAL INFANT FEEDING:     Maternal Emotional State: receptive (22)                                                                 EQUIPMENT TYPE:  Breast Pump Type: double electric, hospital grade (22)  Breast Pump Flange Type: hard (22)  Breast Pump Flange Size: 24 mm, 27 mm (22)                        BREAST PUMPING:  Breast Pumping Interventions: post-feed pumping encouraged (22)       LACTATION REFERRALS:  Lactation Referrals: outpatient lactation program, support group (22)                                       Ugandan

## 2022-01-04 NOTE — REASON FOR VISIT
[Procedure: _________] : a [unfilled] procedure visit [FreeTextEntry1] : Diagnosis: Ascites  [Interpreters_IDNumber] : [id number] [Interpreters_FullName] : Owen [TWNoteComboBox1] : Slovenian

## 2022-01-06 PROBLEM — Z86.010 HISTORY OF COLON POLYPS: Status: ACTIVE | Noted: 2019-02-25

## 2022-01-06 PROBLEM — N18.9 CHRONIC KIDNEY DISEASE, UNSPECIFIED CKD STAGE: Status: ACTIVE | Noted: 2017-11-30

## 2022-01-06 PROBLEM — I10 HYPERTENSION, UNSPECIFIED TYPE: Status: ACTIVE | Noted: 2018-04-24

## 2022-01-06 PROBLEM — R76.8 POSITIVE ANA (ANTINUCLEAR ANTIBODY): Status: RESOLVED | Noted: 2017-09-18 | Resolved: 2022-01-01

## 2022-01-06 PROBLEM — E79.0 ELEVATED URIC ACID IN BLOOD: Status: ACTIVE | Noted: 2017-11-30

## 2022-01-06 PROBLEM — Z87.39 HISTORY OF GOUT: Status: RESOLVED | Noted: 2018-04-24 | Resolved: 2022-01-01

## 2022-01-06 PROBLEM — Z86.19 HISTORY OF HELICOBACTER PYLORI INFECTION: Status: RESOLVED | Noted: 2018-04-24 | Resolved: 2022-01-01

## 2022-01-06 PROBLEM — M10.9 GOUT: Status: ACTIVE | Noted: 2018-04-24

## 2022-01-06 PROBLEM — N18.6 ESRD (END STAGE RENAL DISEASE) ON DIALYSIS: Status: RESOLVED | Noted: 2018-04-24 | Resolved: 2022-01-01

## 2022-01-06 PROBLEM — E11.9 DIABETES MELLITUS: Status: ACTIVE | Noted: 2017-09-18

## 2022-01-06 NOTE — ASSESSMENT
[FreeTextEntry1] : Unclear whether patient has any chronic liver disease.  Review of most recent CAT scan fails to disclose a nodular liver or evidence of portal hypertension.  No SOL liver identified.  Patient has never had encephalopathy or endoscopic evidence of portal hypertension.  No coagulopathy.  No thrombocytopenia.  We will review recent sonogram done at [facility] when available.  Patient is unable to receive diuretics as she is anuric.  She continues with 3 times per week hemodialysis.  She continues with frequent need for large-volume paracentesis.  Ascites was initially thought and still thought to be primarily related to inadequate dialysis.  \par Unclear of significance of high HUGO  (1:1280). \par Personal history of peptic ulcer disease now resolved.\par Personal history of Helicobacter pylori infection.  Treated.  Resolved.\par Personal history of small tubular adenoma on colonoscopy.  Last colonoscopy 6.5 years ago.  Currently asymptomatic from lower GI point of view.\par Chronic constipation resolved.\par \par Plan repeat blood work to include CBC, CMP, coagulation profiles, GGTP, HUGO.\par Review sonogram of the liver.  Consideration for either liver spleen scan or liver biopsy is entertained.\par Refer for hepatology evaluation with Dr. Sehll. \par Defer on repeat colonoscopy at current time given multiple comorbidities.  \par Reduced dose of PPI medication, pantoprazole to 20 mg p.o. daily.  DC Carafate.\par Continue with paracentesis on a as needed basis.  Reordered.\par GI office follow-up here in 4 months.

## 2022-01-06 NOTE — HISTORY OF PRESENT ILLNESS
[FreeTextEntry1] : 61-year-old  female, non-English-speaking, with longstanding history of diabetes and hypertension hyperlipidemia and end-stage renal disease currently on hemodialysis 3 times per week Monday Wednesday and Friday who has recurrent ascites and undergoes paracentesis roughly every 2 weeks about 3 L via interventional radiology.  Last tapped on 1/4 and 2.9 L of fluid removed.  Patient has had 18 paracentesis procedures since 8/20.\par She has intermittent abdominal pain.  There is a history of gout and osteoarthritis and alleged CHF.  Patient has intermittently been thought to have cryptogenic cirrhosis.\par Patient did have surgical intervention for an incarcerated ventral hernia in 3/21 where a 15 cm of incarcerated/dead small bowel were removed and a primary anastomosis was performed and hernia was repaired surgically.\par Patient was recently admitted to [facility] from 12/20 through 23 for abdominal pain.  She had considerable ascites at that time and underwent a large-volume paracentesis with improvement of the abdominal pain.  No evidence for SBP identified.  She had presented with nausea and vomiting and inanition and was continued on hemodialysis.  Upper endoscopy on 12/23/2021 showed gastritis but no esophageal varices or peptic ulcer disease.  Gastric biopsies shows chronic inflammation and intestinal metaplasia of the stomach but no H. pylori.  Since then patient has been continued on Carafate and pantoprazole.  He is doing better.  Appetite is improved and bowel movements are reported to be normal no history of COVID.  Remote history of peptic ulcer disease and H. pylori.  Treated with antibiotics.  Multiple recent urea breath tests have all been negative for recurrence of H. pylori.  When most recently seen a CAT scan of the abdomen of the abdomen and pelvis showed cirrhosis with large volume ascites and splenomegaly.  Gallstones were noted.  Ventral hernia repair noted.  Small bowel anastomosis intact.  Hepatitis ABC profiles were negative.  HUGO was positive at 1-12 80.  AMA was negative.  Platelet counts were always normal.  Coagulation profiles were virtually normal with INR is about 1.2-1.3.  Paracentesis fluid indicated albumin of about 3.4.  LFTs have been normal except for an alk phos ranging from 200-300.  Cardiac evaluation did not identify CHF.\par Since discharge from hospital denies any considerable abdominal pain.  Intermittently will redevelop ascites and required paracentesis roughly every 2 weeks by interventional radiology.  Incision from ventral hernia repair has completely healed.  No evidence for bowel obstruction.\par \par Reviewed pathology report from most recent endoscopy with patient and daughter.  Daughter relates that AICD was never inserted and is no longer an issue.  New PCP is Dr. Fredy Mandujano.  \par Endoscopies have been performed in 2018 and 2019.  This showed varices.  No esophageal varices.  No portal hypertension.  No peptic ulcer disease I ever identified.  \par Last colonoscopy was done in 7/2015 with RC.  A small tubular adenoma was removed from the cecum.  2 mm.  Hemorrhoids were noted.  No other pathology identified.

## 2022-01-19 NOTE — ED ADULT TRIAGE NOTE - CHIEF COMPLAINT QUOTE
Patient came in by ambulance with complains of back pain and abdominal pain started today after HD. Patient alert and oriented times four denies nausea vomiting and dizziness.

## 2022-01-20 NOTE — ED ADULT NURSE NOTE - OBJECTIVE STATEMENT
Pt AAOX3, Pt c/o abdominal pain. Patient reports abdominal pain that radiates to the back that began today during dialysis with associated nausea and 1 episode of vomiting. Patient was admitted to [facility] 1 month ago for the same complaints. pt respirations even and unlabored, pt able to move all extremities well

## 2022-01-20 NOTE — ED ADULT NURSE REASSESSMENT NOTE - NS ED NURSE REASSESS COMMENT FT1
Pt is resting in stretcher comfortably at this time, no apparent distress noted at this time. Pt safety maintained. Pt denies any complaints at this time. pt waiting for family to pickup

## 2022-01-20 NOTE — ED PROVIDER NOTE - PHYSICAL EXAMINATION
Gen: Well appearing in NAD  Head: NC/AT  Neck: trachea midline  Cardiac: RRR  Resp:  No distress; CTAB  Abd: Epigastric TTP  Ext: no deformities  Neuro:  A&O appears non focal  Skin:  Warm and dry as visualized  Psych:  Normal affect and mood

## 2022-01-20 NOTE — ED PROVIDER NOTE - CLINICAL SUMMARY MEDICAL DECISION MAKING FREE TEXT BOX
Patient with known hx of gastritis presents with epigastric pain. Plan for labs, symptom control, and re-assess.

## 2022-01-20 NOTE — ED PROVIDER NOTE - OBJECTIVE STATEMENT
62 y/o female with PMHx of Anemia due to chronic kidney disease, Chronic systolic congestive heart failure, Coronary artery disease, DM, ESRD on dialysis, Gastritis, HLD, HTN, Paroxysmal atrial fibrillation, Pericardial effusion, and Pleural effusion presents to ED c/o abdominal pain. Patient reports abdominal pain that radiates to the back that began today during dialysis with associated nausea and 1 episode of vomiting. Patient was admitted to [facility] 1 month ago for the same complaints.     Denies diarrhea, fever  : Jesús 60 y/o female with PMHx of Anemia due to chronic kidney disease, Chronic systolic congestive heart failure, Coronary artery disease, DM, ESRD on dialysis, Gastritis, HLD, HTN, Paroxysmal atrial fibrillation, Pericardial effusion, and Pleural effusion presents to ED c/o abdominal pain. Patient reports abdominal pain that radiates to the back that began today during dialysis with associated nausea and 1 episode of vomiting. Patient was admitted to [facility] 1 month ago for the same complaints.     Denies diarrhea, fever, CP, SOB.   : Jesús

## 2022-01-20 NOTE — ED ADULT NURSE NOTE - NSIMPLEMENTINTERV_GEN_ALL_ED
Implemented All Fall Risk Interventions:  [place] to call system. Call bell, personal items and telephone within reach. Instruct patient to call for assistance. Room bathroom lighting operational. Non-slip footwear when patient is off stretcher. Physically safe environment: no spills, clutter or unnecessary equipment. Stretcher in lowest position, wheels locked, appropriate side rails in place. Provide visual cue, wrist band, yellow gown, etc. Monitor gait and stability. Monitor for mental status changes and reorient to person, place, and time. Review medications for side effects contributing to fall risk. Reinforce activity limits and safety measures with patient and family.

## 2022-01-20 NOTE — ED PROVIDER NOTE - NSFOLLOWUPINSTRUCTIONS_ED_ALL_ED_FT
- Follow up with your doctor within 2-3 days.   - Bring results with you to the appointment.   - Return to the ED for any new or worsening symptoms.       Abdominal Pain    Many things can cause abdominal pain. Many times, abdominal pain is not caused by a disease and will improve without treatment. Your health care provider will do a physical exam to determine if there is a dangerous cause of your pain; blood tests and imaging may help determine the cause of your pain. However, in many cases, no cause may be found and you may need further testing as an outpatient. Monitor your abdominal pain for any changes.     SEEK IMMEDIATE MEDICAL CARE IF YOU HAVE ANY OF THE FOLLOWING SYMPTOMS: worsening abdominal pain, uncontrollable vomiting, profuse diarrhea, inability to have bowel movements or pass gas, black or bloody stools, fever accompanying chest pain or back pain, or fainting. These symptoms may represent a serious problem that is an emergency. Do not wait to see if the symptoms will go away. Get medical help right away. Call 911 and do not drive yourself to the hospital.

## 2022-01-20 NOTE — ED PROVIDER NOTE - PATIENT PORTAL LINK FT
You can access the FollowMyHealth Patient Portal offered by [facility] by registering at the following website: [email or web address]. By joining [employer]’s FollowMyHealth portal, you will also be able to view your health information using other applications (apps) compatible with our system.

## 2022-01-23 NOTE — PHYSICAL THERAPY INITIAL EVALUATION ADULT - GAIT DEVIATIONS NOTED, PT EVAL
negative increased time in double stance/decreased stride length/decreased weight-shifting ability/decreased angel/decreased step length

## 2022-01-24 NOTE — REASON FOR VISIT
[Procedure: _________] : a [unfilled] procedure visit [Source: ______] : History obtained from [unfilled] [FreeTextEntry1] : Diagnosis: Ascites  [Interpreters_IDNumber] : [id number] [Interpreters_FullName] : Elian [TWNoteComboBox1] : Tongan

## 2022-01-24 NOTE — HISTORY OF PRESENT ILLNESS
[FreeTextEntry1] : \par PRE CALL PRIOR TO APPOINTMENT: \par \par Diagnosis: Ascites \par \par COVID-19 SWAB: Advised to go \par \par RX on file: YES\par \par Referring MD: Fredy Mandujano \par \par Clotting or Bleeding disorders: patient denies \par \par PPM / Defibrillator: patient denies \par \par NPO status advised: yes\par \par History of fall: patient denies / walks with assistance \par \par Assistant device for walking: yes\par \par  home: yes\par \par Blood Thinners: patient denies \par \par Prescribing MD agree to have blood thinner medication held: N/A\par \par Capacity to make decisions: Yes\par \par HCP: yes daughter \par \par DNR: NO, patient denies \par \par Person contact for Pre-Call: daughter \par \par --------------------------------------------------------------------------------------------------------\par Nidhi- Operative Assessment: (Day of Procedure)\par \par NPO status: yes\par \par Falls risk: yes, yellow bracelet on \par \par Labs: IN CHART \par \par IR MD:Leno Mahoney \par \par Urine Pregnancy: N/A\par \par PRE-OP instructions: YES\par \par POST-OP teaching initiated: YES\par \par Allergy bracelet on: n/a\par \par Antibiotic given: NO

## 2022-01-28 NOTE — H&P ADULT - NSHPLANGLIMITEDENGLISH_GEN_A_CORE
"Chief Complaint   Patient presents with   • Hip Pain     L hip/buttocks/lower back pain, acute on chronic, mechanical injury 1 year ago while bending over, surgery shortly after, now acute pain X 5 days, pt denies incident for acute pain, pt ambulates at baseline with walker but is not able to ambulate now r/t pain      Pt BIB EMS for above complaint, VSS on RA, GCS 15, NAD.     Denies all s/sx of covid, denies recent travel, denies fevers.    /73   Pulse 95   Temp 36.1 °C (97 °F) (Temporal)   Resp 18   Ht 1.626 m (5' 4\")   Wt 77.1 kg (170 lb) Comment: Estimated weight Via Pt  SpO2 95%   BMI 29.18 kg/m²      " Yes

## 2022-02-16 NOTE — HISTORY OF PRESENT ILLNESS
[FreeTextEntry1] : PRE CALL PRIOR TO APPOINTMENT: \par \par Diagnosis: Ascites \par \par COVID-19 SWAB: Advised to go \par \par RX on file: YES\par \par Referring MD: Fredy Mandujano \par \par Clotting or Bleeding disorders: patient denies \par \par PPM / Defibrillator: patient denies \par \par NPO status advised: yes\par \par History of fall: patient denies / walks with assistance \par \par Assistant device for walking: yes\par \par  home: yes\par \par Blood Thinners: patient denies \par \par Prescribing MD agree to have blood thinner medication held: N/A\par \par Capacity to make decisions: Yes\par \par HCP: yes daughter \par \par DNR: NO, patient denies \par \par Person contact for Pre-Call: daughter \par \par --------------------------------------------------------------------------------------------------------\par Nidhi- Operative Assessment: (Day of Procedure)\par \par NPO status: yes\par \par Falls risk: yes, yellow bracelet on \par \par Labs: IN CHART \par \par IR MD:Leno Mahoney \par \par Urine Pregnancy: N/A\par \par PRE-OP instructions: YES\par \par POST-OP teaching initiated: YES\par \par Allergy bracelet on: n/a\par \par Antibiotic given: NO

## 2022-02-16 NOTE — REASON FOR VISIT
[Family Member] : family member [[employer] ] : provided by [employer]   [FreeTextEntry1] : Diagnosis: Ascites  [Interpreters_IDNumber] : [id number] [Interpreters_FullName] : NATHANAEL [TWNoteComboBox1] : [place]

## 2022-02-23 NOTE — ED ADULT NURSE NOTE - PAIN RATING/NUMBER SCALE (0-10): ACTIVITY
0 O-T Advancement Flap Text: The defect edges were debeveled with a #15 scalpel blade.  Given the location of the defect, shape of the defect and the proximity to free margins an O-T advancement flap was deemed most appropriate.  Using a sterile surgical marker, an appropriate advancement flap was drawn incorporating the defect and placing the expected incisions within the relaxed skin tension lines where possible.    The area thus outlined was incised deep to adipose tissue with a #15 scalpel blade.  The skin margins were undermined to an appropriate distance in all directions utilizing iris scissors.

## 2022-02-25 NOTE — HISTORY OF PRESENT ILLNESS
[FreeTextEntry1] : \par PRE CALL PRIOR TO APPOINTMENT: \par \par Diagnosis: Ascites \par \par COVID-19 SWAB: Advised to go \par \par RX on file: YES\par \par Referring MD: Fredy Mandujano \par \par Clotting or Bleeding disorders: patient denies \par \par PPM / Defibrillator: patient denies \par \par NPO status advised: yes\par \par History of fall: patient denies / walks with assistance \par \par Assistant device for walking: yes\par \par  home: yes\par \par Blood Thinners: patient denies \par \par Prescribing MD agree to have blood thinner medication held: N/A\par \par Capacity to make decisions: Yes\par \par HCP: yes daughter \par \par DNR: NO, patient denies \par \par Person contact for Pre-Call: daughter  Detail Level: Zone

## 2022-02-28 NOTE — ASSESSMENT
[FreeTextEntry1] : Unclear whether patient has any chronic liver disease.  Review of most recent CAT scan fails to disclose a nodular liver or evidence of portal hypertension.  No SOL liver identified.  Patient has never had encephalopathy or endoscopic evidence of portal hypertension.  No coagulopathy.  No thrombocytopenia.  We will review recent sonogram done at [facility] when available.  Patient is unable to receive diuretics as she is anuric.  She continues with 3 times per week hemodialysis.  She continues with frequent need for large-volume paracentesis.  Ascites was initially thought and still thought to be primarily related to inadequate dialysis.  \par Unclear of significance of high HUGO  (1:1280). \par

## 2022-02-28 NOTE — HISTORY OF PRESENT ILLNESS
[FreeTextEntry1] : 61-year-old  female, non-English-speaking, with longstanding history of diabetes and hypertension hyperlipidemia and end-stage renal disease currently on hemodialysis 3 times per week Monday Wednesday and Friday who has recurrent ascites and undergoes paracentesis roughly every 2 weeks about 3 L via interventional radiology.  Last tapped on 1/4 and 2.9 L of fluid removed.  Patient has had 18 paracentesis procedures since 8/20.\par She has intermittent abdominal pain.  There is a history of gout and osteoarthritis and alleged CHF.  Patient has intermittently been thought to have cryptogenic cirrhosis.\par Patient did have surgical intervention for an incarcerated ventral hernia in 3/21 where a 15 cm of incarcerated/dead small bowel were removed and a primary anastomosis was performed and hernia was repaired surgically.\par Patient was recently admitted to [facility] from 12/20 through 23 for abdominal pain.  She had considerable ascites at that time and underwent a large-volume paracentesis with improvement of the abdominal pain.  No evidence for SBP identified.  She had presented with nausea and vomiting and inanition and was continued on hemodialysis.  Upper endoscopy on 12/23/2021 showed gastritis but no esophageal varices or peptic ulcer disease.  Gastric biopsies shows chronic inflammation and intestinal metaplasia of the stomach but no H. pylori.  Since then patient has been continued on Carafate and pantoprazole.  He is doing better.  Appetite is improved and bowel movements are reported to be normal no history of COVID.  Remote history of peptic ulcer disease and H. pylori.  Treated with antibiotics.  Multiple recent urea breath tests have all been negative for recurrence of H. pylori.  When most recently seen a CAT scan of the abdomen of the abdomen and pelvis showed cirrhosis with large volume ascites and splenomegaly.  Gallstones were noted.  Ventral hernia repair noted.  Small bowel anastomosis intact.  Hepatitis ABC profiles were negative.  HUGO was positive at 1-12 80.  AMA was negative.  Platelet counts were always normal.  Coagulation profiles were virtually normal with INR is about 1.2-1.3.  Paracentesis fluid indicated albumin of about 3.4.  LFTs have been normal except for an alk phos ranging from 200-300.  Cardiac evaluation did not identify CHF.\par Since discharge from hospital denies any considerable abdominal pain.  Intermittently will redevelop ascites and required paracentesis roughly every 2 weeks by interventional radiology.  Incision from ventral hernia repair has completely healed.  No evidence for bowel obstruction.\par \par Reviewed pathology report from most recent endoscopy with patient and daughter.  Daughter relates that AICD was never inserted and is no longer an issue.  New PCP is Dr. Fredy Mandujano.  \par Endoscopies have been performed in 2018 and 2019.  This showed varices.  No esophageal varices.  No portal hypertension.  No peptic ulcer disease I ever identified.  \par Last colonoscopy was done in 7/2015 with RC.  A small tubular adenoma was removed from the cecum.  2 mm.  Hemorrhoids were noted.  No other pathology identified.

## 2022-03-30 NOTE — ASSESSMENT
[FreeTextEntry1] : Total fluid removed from paracentesis: 4100 ML\par \par FORMAL REPORT TO FOLLOW\par

## 2022-03-30 NOTE — HISTORY OF PRESENT ILLNESS
[FreeTextEntry1] : PRE CALL PRIOR TO APPOINTMENT: \par \par Diagnosis: Ascites \par \par COVID-19 SWAB: Advised to go \par \par RX on file: YES\par \par Referring MD: Fredy Mandujano \par \par Clotting or Bleeding disorders: patient denies \par \par PPM / Defibrillator: patient denies \par \par NPO status advised: yes\par \par History of fall: patient denies / walks with assistance \par \par Assistant device for walking: yes\par \par  home: yes\par \par Blood Thinners: patient denies \par \par Prescribing MD agree to have blood thinner medication held: N/A\par \par Capacity to make decisions: Yes\par \par HCP: yes daughter \par \par DNR: NO, patient denies \par \par Person contact for Pre-Call: daughter \par \par --------------------------------------------------------------------------------------------------------\par Nidhi- Operative Assessment: (Day of Procedure)\par  ID #: [id number]\par \par NPO status: yes\par \par Falls risk: yes, yellow bracelet on \par \par Labs: IN CHART \par \par IR MD:Leno Mahoney \par \par Urine Pregnancy: N/A\par \par PRE-OP instructions: YES\par \par POST-OP teaching initiated: YES\par \par Allergy bracelet on: n/a\par \par Antibiotic given: NO \par

## 2022-04-12 NOTE — HISTORY OF PRESENT ILLNESS
[FreeTextEntry1] : History of Present Illness\par PRE CALL PRIOR TO APPOINTMENT: 4/12/2022\par \par Diagnosis: Ascites \par \par COVID-19 SWAB: Advised to go \par \par RX on file: YES\par \par Referring MD: Fredy Mandujano \par \par Clotting or Bleeding disorders: patient denies \par \par PPM / Defibrillator: patient denies \par \par NPO status advised: yes\par \par History of fall: patient denies / walks with assistance \par \par Assistant device for walking: yes\par \par  home: yes\par \par Blood Thinners: patient denies \par \par Prescribing MD agree to have blood thinner medication held: N/A\par \par Capacity to make decisions: Yes\par \par HCP: yes daughter \par \par DNR: NO, patient denies \par \par Person contact for Pre-Call: daughter \par \par --------------------------------------------------------------------------------------------------------\par Nidhi- Operative Assessment: (Day of Procedure)\par \par NPO status: yes\par \par Falls risk: yes, yellow bracelet on \par \par Labs: IN CHART \par \par IR MD:Leno Mahoney \par \par Urine Pregnancy: N/A\par \par PRE-OP instructions: YES\par \par POST-OP teaching initiated: YES\par \par Allergy bracelet on: n/a\par \par Antibiotic given: NO \par  \par

## 2022-04-13 NOTE — PROGRESS NOTE ADULT - ASSESSMENT
58 year old female with ESRD on HD (MWF) previously with R chest permacath, now with LUE AV graft used for HD 9/1    Plan:  Successful HD over the weekend.  Permacath to be removed after patient has several successful HD sessions with use of LUE AVG.  Will f/u with Dr. Segundo to assess when to remove of permacath. FAMILY HISTORY:  Family history of hypertension    Sibling  Still living? Yes, Estimated age: 51-60  Family history of diabetes mellitus type II, Age at diagnosis: 41-50  Family history of heart disease, Age at diagnosis: 41-50

## 2022-04-27 NOTE — HISTORY OF PRESENT ILLNESS
[FreeTextEntry1] : PRE CALL PRIOR TO APPOINTMENT: \par \par Diagnosis: Ascites \par \par COVID-19 SWAB: Advised to go \par \par RX on file: YES\par \par Referring MD: Ferdy Mandujano \par \par Clotting or Bleeding disorders: patient denies \par \par PPM / Defibrillator: patient denies \par \par NPO status advised: yes\par \par History of fall: patient denies / walks with assistance \par \par Assistant device for walking: yes\par \par  home: yes\par \par Blood Thinners: patient denies \par \par Prescribing MD agree to have blood thinner medication held: N/A\par \par Capacity to make decisions: Yes\par \par HCP: yes daughter \par \par DNR: NO, patient denies \par \par Person contact for Pre-Call: daughter \par \par --------------------------------------------------------------------------------------------------------\par Nidhi- Operative Assessment: (Day of Procedure)\par \par NPO status: yes\par \par Falls risk: yes, yellow bracelet on \par \par Labs: IN CHART \par \par IR MD:Leno Mahoney \par \par Urine Pregnancy: N/A\par \par PRE-OP instructions: YES\par \par POST-OP teaching initiated: YES\par \par Allergy bracelet on: n/a\par \par Antibiotic given: NO \par

## 2022-05-08 NOTE — ED PROVIDER NOTE - CHIEF COMPLAINT
[address] (2016)      Pulmonary hypertension (2016)      EVERETTE on CPAP (2016)      Diabetes mellitus type 2, controlled ([address] (10/11/2016)      Coronary artery disease involving native coronary artery without angina pectoris (10/11/2016)      Hypertension (10/11/2016)      Hyperlipidemia (10/11/2016)      NSTEMI (non-ST elevated myocardial infarction) ([address] (2022)      Encounter for weaning from ventilator ([address] (2022)      Acute pulmonary edema (HCC) (Keith Erickson.  Luis Antonio Milan MD The patient is a 60y Female complaining of abdominal pain.

## 2022-05-10 NOTE — ED ADULT NURSE NOTE - OBJECTIVE STATEMENT
Pt A&O x 4 c/o abdominal pain with N/V and fatigue starting this morning. Pt also reports neck pain. Pt denies sick contacts at home. Denies diarrhea. Reports receiving dialysis but does not remember days. GCS 15, NIH 0. Abdomen flat, soft, tender to touch. +BS all 4 quadrants. IV access obtained; specimens sent to lab. Will continue to monitor.

## 2022-05-10 NOTE — ED PROVIDER NOTE - PHYSICAL EXAMINATION
Gen: Well appearing in NAD  Head: NC/AT  Neck: trachea midline  Cardiac: RRR  Resp:  No distress; CTAB  Abd: Soft, diffuse abdominal TTP   Ext: no deformities, no lower extremity edema   Neuro:  A&O appears non focal  Skin:  Warm and dry as visualized  Psych:  Normal affect and mood

## 2022-05-10 NOTE — ED PROVIDER NOTE - CLINICAL SUMMARY MEDICAL DECISION MAKING FREE TEXT BOX
Patient poor historian with  chronic abdominal pain, nausea, hyperglycemia, back pain, weakness. Plan for symptom control, labs, CT abdomen/pelvis, and re-assess.

## 2022-05-10 NOTE — ED ADULT TRIAGE NOTE - CHIEF COMPLAINT QUOTE
Patient BIBA from home with c/o nausea, abdominal pains, back pains, hyperglycemia for the past couple of days.  Goes to hemodialysis m-w-f

## 2022-05-10 NOTE — ED PROVIDER NOTE - OBJECTIVE STATEMENT
63 y/o female with PMHx of Anemia due to chronic kidney disease, Chronic systolic congestive heart failure, Coronary artery disease, DM, ESRD on dialysis M/W/F,  Gastritis, HLD, HTN, Paroxysmal atrial fibrillation, Pericardial effusion, and Pleural effusion presents to ED c/o abdominal pain. Patient reports abdominal pain with associated N/V, and back pain. Patient reports she had a paracentesis today and states the pain is better. Also reporting weakness that began today    : Jaison

## 2022-05-10 NOTE — ED ADULT NURSE NOTE - NSIMPLEMENTINTERV_GEN_ALL_ED
Implemented All Fall with Harm Risk Interventions:  [place] to call system. Call bell, personal items and telephone within reach. Instruct patient to call for assistance. Room bathroom lighting operational. Non-slip footwear when patient is off stretcher. Physically safe environment: no spills, clutter or unnecessary equipment. Stretcher in lowest position, wheels locked, appropriate side rails in place. Provide visual cue, wrist band, yellow gown, etc. Monitor gait and stability. Monitor for mental status changes and reorient to person, place, and time. Review medications for side effects contributing to fall risk. Reinforce activity limits and safety measures with patient and family. Provide visual clues: red socks.

## 2022-05-10 NOTE — ED PROVIDER NOTE - PROGRESS NOTE DETAILS
Gale AGUILERA for ED attending, Dr. Dela Cruz. Patient can have CT with contrast despite elevated creatine level Gale AGUILERA for ED attending, Dr. Dela Cruz. Discussed with jalil Gregorio to give blood in ED. Will see patient in the morning for dialysis Gale AGUILERA for ED attending, Dr. Dela Cruz. Surgery consulted Jose De Jesus MO: Surgery to follow. CTA ordered. Admitted to MICU

## 2022-05-10 NOTE — ED ADULT NURSE NOTE - NS PRO PASSIVE SMOKE EXP
[Obese] : obese [Acanthosis Nigricans___] : acanthosis nigricans over [unfilled] [Normal Appearance] : normal appearance [Well formed] : well formed [WNL for age] : within normal limits of age [Normal S1 and S2] : normal S1 and S2 [Clear to Ausculation Bilaterally] : clear to auscultation bilaterally [Abdomen Soft] : soft [Normal] : normal  [Murmur] : no murmurs [2/6 Ejection Systolic Murmur] : no ejection systolic murmur  No

## 2022-05-10 NOTE — ED PROVIDER NOTE - CRITICAL CARE ATTENDING CONTRIBUTION TO CARE
Upon my evaluation, this patient had a high probability of imminent or life-threatening deterioration due to hemorrhagic ascites which required my direct attention, intervention, and personal management.  The patient has a  medical condition that impairs one or more vital organ systems.  Frequent personal assessment and adjustment of medical interventions was performed.      I have personally provided 45 minutes of critical care time exclusive of time spent on separately billable procedures. Time includes review of laboratory data, radiology results, discussion with consultants, patient and family; monitoring for potential decompensation, as well as time spent retrieving data and reviewing the chart and documenting the visit. Interventions were performed as documented above.

## 2022-05-11 NOTE — CONSULT NOTE ADULT - ASSESSMENT
ASSESSMENT: Patient is a 62yf with extensive PMH including CHF, CAD, DM presenting with hemoperitoneum s/p paracentesis earlier today.     PLAN:    - No emergent surgical intervention at this time  - Recommend CTA to r/o active bleed  - Would consult MICU for management of patient's multiple medical conditions  - Serial H/H q6  - Surgery will follow closely  - Plan discussed with Attending, Dr. Ovalles

## 2022-05-11 NOTE — CHART NOTE - NSCHARTNOTEFT_GEN_A_CORE
Patient evaluated at the bedside. Awake, alert, comfortable, in no acute distress. Not complaining of any abdominal pain. Hemodynamically stable, O2 sat 100% on room air.    H/H 7.5/24 S/P 3 units pRBCs total, improved from 6.7/22.1 on admission. CT A/P shows stable hemoperitoneum, no active extravasation.    Stable for downgrade to telemetry unit. Further care and management per Dr Montano.      Case discussed with MICU attending Dr Ellison and accepting hospitalist Dr Montano.

## 2022-05-11 NOTE — REASON FOR VISIT
[Procedure: _________] : a [unfilled] procedure visit [[employer] ] : provided by [employer]   [Source: ______] : History obtained from [unfilled] [FreeTextEntry1] : diagnosis: ascites [Interpreters_IDNumber] : [id number] [TWNoteComboBox1] : [place]

## 2022-05-11 NOTE — PATIENT PROFILE ADULT - FALL HARM RISK - FALL HARM RISK
No indicators present Cimzia Counseling:  I discussed with the patient the risks of Cimzia including but not limited to immunosuppression, allergic reactions and infections.  The patient understands that monitoring is required including a PPD at baseline and must alert us or the primary physician if symptoms of infection or other concerning signs are noted.

## 2022-05-11 NOTE — CONSULT NOTE ADULT - ASSESSMENT
A) S/P retroperitoneal bleed, VELA with recurrent ascites tapped yesterday; CRF 5; Anemia; DM 2.    P) Blood, HD, Follow up labs.

## 2022-05-11 NOTE — H&P ADULT - ASSESSMENT
61 y/o F with a h/o ESRD on HD (M/W/F), CAD, CHFrEF (LVEF 40-45%), cirrhosis (?VELA, recurrent ascites requiring intermittent paracentesis), pAF (no a/c secondary to prior GIB), DM2, anemia, HTN, HLD, hypothyroid, with:    Large hemoperitoneum, acute blood loss anemia, hyperglycemia.    Admit to MICU for further management.    Suspect patient has iatrogenic hemoperitoneum related to yesterday's paracentesis.    - CT angio A/P shows large volume ascites with large right sided peritoneal hematoma without active extravasation of IV contrast  - 20mcg DDAVP stat in setting of platelet dysfunction with ESRD  - transfuse 1u PRBC now  - trend CBC Q 6 hours, continue to transfuse blood product for Hgb < 7 and/or for symptomatic bleeding  - hold anticoagulant and antiplatelet agents  - monitor hemodynamics closely, maintain a MAP > 65, she is at increased risk for life threatening hemodynamic deterioration  - monitor clinical and laboratory end-points of perfusion  - initial lactate 3.9, will repeat now  - general surgery input appreciated, no intervention required at this time  - send blood cultures and start empiric ceftriaxone for SBP prophylaxis  - +, start sliding scale SC insulin coverage, may require IV insulin if this proves difficult to control, goal BG < 180  - check Hgba1c  - will consult nephrology to facilitate routine hemodialysis as she is due for a session today    Case discussed in detail with MICU physician, Dr. Ellison.      CRITICAL CARE TIME SPENT: 48 mins  Time spent evaluating/treating patient with medical issues that pose a high risk for life threatening deterioration and/or end-organ damage, reviewing data/labs/imaging, discussing case with multidisciplinary team, discussing plan/goals of care with patient/family. Non-inclusive of procedure time.

## 2022-05-11 NOTE — HISTORY OF PRESENT ILLNESS
[FreeTextEntry1] : PRE CALL PRIOR TO APPOINTMENT: \par \par Diagnosis: Ascites \par \par COVID-19 SWAB: Advised to go \par \par RX on file: YES\par \par Referring MD: Fredy Mandujano \par \par Clotting or Bleeding disorders: patient denies \par \par PPM / Defibrillator: patient denies \par \par NPO status advised: yes\par \par History of fall: patient denies / walks with assistance \par \par Assistant device for walking: yes\par \par  home: yes\par \par Blood Thinners: patient denies \par \par Prescribing MD agree to have blood thinner medication held: N/A\par \par Capacity to make decisions: Yes\par \par HCP: yes daughter \par \par DNR: NO, patient denies \par \par Person contact for Pre-Call: daughter \par \par --------------------------------------------------------------------------------------------------------\par Nidhi- Operative Assessment: (Day of Procedure)\par \par NPO status: yes\par \par Falls risk: yes, yellow bracelet on \par \par Labs: IN CHART \par \par IR MD:Leno Mahoney \par \par Urine Pregnancy: N/A\par \par PRE-OP instructions: YES\par \par POST-OP teaching initiated: YES\par \par Allergy bracelet on: n/a\par \par Antibiotic given: NO \par \par

## 2022-05-11 NOTE — PROGRESS NOTE ADULT - SUBJECTIVE AND OBJECTIVE BOX
pt. seen and examined. pt. reports no abd. pain, no n/v/d. no cp, no sob. lying in bed appears comfortable. had dialysis today, got 2 units of blood for hemoperitoneum. pt. VS stable and now downgraded to medical team.  ROS : negative except for the ones mentioned above.    Hospital course :  pt. is a 61 y/o F with a h/o ESRD on HD (M/W/F), CAD, CHFrEF (LVEF 40-45%), cirrhosis (?VELA, recurrent ascites requiring intermittent paracentesis), pAF (no a/c secondary to prior GIB), DM2, anemia, HTN, HLD, hypothyroid, presents to the ED with complaints of abdominal pain with radiation to back and n/v. Of note, she had a paracentesis performed yesterday with interventional radiology and 4750cc of ascites was removed. H/H found to be 6.7/22 in ED. CTA A/P reveals large volume ascites with large peritoneal hematoma.    P/E   Vital Signs Last 24 Hrs  T(C): 36.7 (05-11-22 @ 19:19), Max: 37.6 (05-11-22 @ 00:25)  T(F): 98 (05-11-22 @ 19:19), Max: 99.7 (05-11-22 @ 00:25)  HR: 72 (05-11-22 @ 21:00) (71 - 94)  BP: 136/78 (05-11-22 @ 21:00) (106/55 - 146/73)  BP(mean): 95 (05-11-22 @ 21:00) (71 - 95)  RR: 22 (05-11-22 @ 21:00) (14 - 26)  SpO2: 100% (05-11-22 @ 21:00) (94% - 100%)    General: pt. in bed not in distress.  HEENT: AT, NC. PERRL. intact EOM. no throat erythema or exudate.   Neck: supple. no JVD. no palpable lymph nodes  Chest: CTA bilaterally  Heart: normal S1,S2. RRR. no heart murmur.  Abdomen: soft. non-tender. non-distended. + BS. no hernia or palpable masses.  Genital: not examined  Ext: no C/C/E. no calf tenderness.  Neuro: AAO x3. no focal weakness. no speech disorder  Skin: no rash pt. seen and examined came with abd. pain, vomiting. pt. was admitted for large peritoneal hematoma. now pt. stable, reports no abd. pain, no n/v/d. no cp, no sob. lying in bed appears comfortable. had dialysis today, got 2 units of blood for hemoperitoneum. pt. VS stable and now downgraded to medical team.  ROS : negative except for the ones mentioned above.    Hospital course :  pt. is a 61 y/o F with a h/o ESRD on HD (M/W/F), CAD, CHFrEF (LVEF 40-45%), cirrhosis (?VELA, recurrent ascites requiring intermittent paracentesis), pAF (no a/c secondary to prior GIB), DM2, anemia, HTN, HLD, hypothyroid, presents to the ED with complaints of abdominal pain with radiation to back and n/v. Of note, she had a paracentesis performed yesterday with interventional radiology and 4750cc of ascites was removed. H/H found to be 6.7/22 in ED. CTA A/P reveals large volume ascites with large peritoneal hematoma.    P/E   Vital Signs Last 24 Hrs  T(C): 36.7 (05-11-22 @ 19:19), Max: 37.6 (05-11-22 @ 00:25)  T(F): 98 (05-11-22 @ 19:19), Max: 99.7 (05-11-22 @ 00:25)  HR: 72 (05-11-22 @ 21:00) (71 - 94)  BP: 136/78 (05-11-22 @ 21:00) (106/55 - 146/73)  BP(mean): 95 (05-11-22 @ 21:00) (71 - 95)  RR: 22 (05-11-22 @ 21:00) (14 - 26)  SpO2: 100% (05-11-22 @ 21:00) (94% - 100%)    General: pt. in bed not in distress.  HEENT: AT, NC. PERRL. intact EOM. no throat erythema or exudate.   Neck: supple. no JVD.  Chest: CTA bilaterally  Heart: S1,S2. RRR. no heart murmur. no edema.   Abdomen: soft. non-tender. non-distended. umbilical hernia about 1.5 inch long and 0.5 inch wide, soft, non tender, reducible,+ BS.   Ext:  no calf tenderness, LUE av graft with thrill.  Neuro: AAO x3. no focal weakness.  psych : mood ok, no si/hi.  skin : no obvious rash, warm and dry.     MEDICATIONS  (STANDING):  cefTRIAXone   IVPB      chlorhexidine 2% Cloths 1 Application(s) Topical <User Schedule>  dextrose 5%. 1000 milliLiter(s) (100 mL/Hr) IV Continuous <Continuous>  dextrose 5%. 1000 milliLiter(s) (50 mL/Hr) IV Continuous <Continuous>  dextrose 50% Injectable 25 Gram(s) IV Push once  dextrose 50% Injectable 12.5 Gram(s) IV Push once  dextrose 50% Injectable 25 Gram(s) IV Push once  glucagon  Injectable 1 milliGRAM(s) IntraMuscular once  insulin lispro (ADMELOG) corrective regimen sliding scale   SubCutaneous every 6 hours  metoprolol tartrate 25 milliGRAM(s) Oral two times a day  pantoprazole  Injectable 40 milliGRAM(s) IV Push daily    MEDICATIONS  (PRN):  dextrose Oral Gel 15 Gram(s) Oral once PRN Blood Glucose LESS THAN 70 milliGRAM(s)/deciliter  ondansetron Injectable 4 milliGRAM(s) IV Push every 6 hours PRN Nausea and/or Vomiting      LABS :                           7.5    4.19  )-----------( 140      ( 11 May 2022 19:27 )             24.0     05-11    136  |  96<L>  |  28.0<H>  ----------------------------<  160<H>  4.8   |  29.0  |  4.93<H>    Ca    8.2<L>      11 May 2022 06:51  Phos  4.6     05-11  Mg     1.8     05-11    TPro  5.0<L>  /  Alb  2.4<L>  /  TBili  0.3<L>  /  DBili  x   /  AST  11  /  ALT  6   /  AlkPhos  58  05-11            PT/INR - ( 11 May 2022 03:44 )   PT: 14.7 sec;   INR: 1.26 ratio         PTT - ( 11 May 2022 03:44 )  PTT:29.4 sec pt. seen and examined came with abd. pain, vomiting. pt. was admitted for large peritoneal hematoma. now pt. stable, reports no abd. pain, no n/v/d. no cp, no sob. lying in bed appears comfortable. had dialysis today, got 2 units of blood for hemoperitoneum. pt. VS stable and now downgraded to medical team. pt. has been evaluated by surgery team as well.   ROS : negative except for the ones mentioned above.    Hospital course :  pt. is a 61 y/o F with a h/o ESRD on HD (M/W/F), CAD, CHFrEF (LVEF 40-45%), cirrhosis (?VELA, recurrent ascites requiring intermittent paracentesis), pAF (no a/c secondary to prior GIB), DM2, anemia, HTN, HLD, hypothyroid, presents to the ED with complaints of abdominal pain with radiation to back and n/v. Of note, she had a paracentesis performed yesterday with interventional radiology and 4750cc of ascites was removed. H/H found to be 6.7/22 in ED. CTA A/P reveals large volume ascites with large peritoneal hematoma.    P/E   Vital Signs Last 24 Hrs  T(C): 36.7 (05-11-22 @ 19:19), Max: 37.6 (05-11-22 @ 00:25)  T(F): 98 (05-11-22 @ 19:19), Max: 99.7 (05-11-22 @ 00:25)  HR: 72 (05-11-22 @ 21:00) (71 - 94)  BP: 136/78 (05-11-22 @ 21:00) (106/55 - 146/73)  BP(mean): 95 (05-11-22 @ 21:00) (71 - 95)  RR: 22 (05-11-22 @ 21:00) (14 - 26)  SpO2: 100% (05-11-22 @ 21:00) (94% - 100%)    General: pt. in bed not in distress.  HEENT: AT, NC. PERRL. intact EOM. no throat erythema or exudate.   Neck: supple. no JVD.  Chest: CTA bilaterally  Heart: S1,S2. RRR. no heart murmur. no edema.   Abdomen: soft. non-tender. non-distended. umbilical hernia about 1.5 inch long and 0.5 inch wide, soft, non tender, reducible,+ BS.   Ext:  no calf tenderness, LUE av graft with thrill.  Neuro: AAO x3. no focal weakness.  psych : mood ok, no si/hi.  skin : no obvious rash, warm and dry.     MEDICATIONS  (STANDING):  cefTRIAXone   IVPB      chlorhexidine 2% Cloths 1 Application(s) Topical <User Schedule>  dextrose 5%. 1000 milliLiter(s) (100 mL/Hr) IV Continuous <Continuous>  dextrose 5%. 1000 milliLiter(s) (50 mL/Hr) IV Continuous <Continuous>  dextrose 50% Injectable 25 Gram(s) IV Push once  dextrose 50% Injectable 12.5 Gram(s) IV Push once  dextrose 50% Injectable 25 Gram(s) IV Push once  glucagon  Injectable 1 milliGRAM(s) IntraMuscular once  insulin lispro (ADMELOG) corrective regimen sliding scale   SubCutaneous every 6 hours  metoprolol tartrate 25 milliGRAM(s) Oral two times a day  pantoprazole  Injectable 40 milliGRAM(s) IV Push daily    MEDICATIONS  (PRN):  dextrose Oral Gel 15 Gram(s) Oral once PRN Blood Glucose LESS THAN 70 milliGRAM(s)/deciliter  ondansetron Injectable 4 milliGRAM(s) IV Push every 6 hours PRN Nausea and/or Vomiting      LABS :                           7.5    4.19  )-----------( 140      ( 11 May 2022 19:27 )             24.0     05-11    136  |  96<L>  |  28.0<H>  ----------------------------<  160<H>  4.8   |  29.0  |  4.93<H>    Ca    8.2<L>      11 May 2022 06:51  Phos  4.6     05-11  Mg     1.8     05-11    TPro  5.0<L>  /  Alb  2.4<L>  /  TBili  0.3<L>  /  DBili  x   /  AST  11  /  ALT  6   /  AlkPhos  58  05-11            PT/INR - ( 11 May 2022 03:44 )   PT: 14.7 sec;   INR: 1.26 ratio         PTT - ( 11 May 2022 03:44 )  PTT:29.4 sec    A/P   pt. is a 61 y/o F with a h/o ESRD on HD (M/W/F), CAD, CHFrEF (LVEF 40-45%), cirrhosis (?VELA, recurrent ascites requiring intermittent paracentesis), pAF (no a/c secondary to prior GIB), DM2, anemia, HTN, HLD, hypothyroid, presents to the ED with complaints of abdominal pain with radiation to back and n/v. Of note, she had a paracentesis performed yesterday with interventional radiology and 4750cc of ascites was removed. H/H found to be 6.7/22 in ED. CTA A/P reveals large volume ascites with large peritoneal hematoma.    - hemoperitoneum / peritoneal hematoma.  s/p paracentesis done yesterday, Hb stable for now, follow repeat in am, serial abd. exam, no abd. pain at this point. surgery team following.     - ESRD, s/p dialysis, nephrology team following.    - liver cirrhosis , VELA ? s/p peritoneal hematoma, empirically on iv rocephin to cover sbp.     - htn, continue metoprolol.       - compression boots for dvt prophylaxis at tis point.    hospitalist team will follow.      pt. seen and examined came with abd. pain, vomiting. pt. was admitted for large peritoneal hematoma. now pt. stable, reports no abd. pain, no n/v/d. no cp, no sob. lying in bed appears comfortable. had dialysis today, got 2 units of blood for hemoperitoneum. pt. VS stable and now downgraded to medical team. pt. has been evaluated by surgery team as well.   ROS : negative except for the ones mentioned above.    Hospital course :  pt. is a 63 y/o F with a h/o ESRD on HD (M/W/F), CAD, CHFrEF (LVEF 40-45%), cirrhosis (?VELA, recurrent ascites requiring intermittent paracentesis), pAF (no a/c secondary to prior GIB), DM2, anemia, HTN, HLD, hypothyroid, presents to the ED with complaints of abdominal pain with radiation to back and n/v. Of note, she had a paracentesis performed yesterday with interventional radiology and 4750cc of ascites was removed. H/H found to be 6.7/22 in ED. CTA A/P reveals large volume ascites with large peritoneal hematoma.    P/E   Vital Signs Last 24 Hrs  T(C): 36.7 (05-11-22 @ 19:19), Max: 37.6 (05-11-22 @ 00:25)  T(F): 98 (05-11-22 @ 19:19), Max: 99.7 (05-11-22 @ 00:25)  HR: 72 (05-11-22 @ 21:00) (71 - 94)  BP: 136/78 (05-11-22 @ 21:00) (106/55 - 146/73)  BP(mean): 95 (05-11-22 @ 21:00) (71 - 95)  RR: 22 (05-11-22 @ 21:00) (14 - 26)  SpO2: 100% (05-11-22 @ 21:00) (94% - 100%)    General: pt. in bed not in distress.  HEENT: AT, NC. PERRL. intact EOM. no throat erythema or exudate.   Neck: supple. no JVD.  Chest: CTA bilaterally  Heart: S1,S2. RRR. no heart murmur. no edema.   Abdomen: soft. non-tender. non-distended. umbilical hernia about 1.5 inch long and 0.5 inch wide, soft, non tender, reducible,+ BS.   Ext:  no calf tenderness, LUE av graft with thrill.  Neuro: AAO x3. no focal weakness.  psych : mood ok, no si/hi.  skin : no obvious rash, warm and dry.     MEDICATIONS  (STANDING):  cefTRIAXone   IVPB      chlorhexidine 2% Cloths 1 Application(s) Topical <User Schedule>  dextrose 5%. 1000 milliLiter(s) (100 mL/Hr) IV Continuous <Continuous>  dextrose 5%. 1000 milliLiter(s) (50 mL/Hr) IV Continuous <Continuous>  dextrose 50% Injectable 25 Gram(s) IV Push once  dextrose 50% Injectable 12.5 Gram(s) IV Push once  dextrose 50% Injectable 25 Gram(s) IV Push once  glucagon  Injectable 1 milliGRAM(s) IntraMuscular once  insulin lispro (ADMELOG) corrective regimen sliding scale   SubCutaneous every 6 hours  metoprolol tartrate 25 milliGRAM(s) Oral two times a day  pantoprazole  Injectable 40 milliGRAM(s) IV Push daily    MEDICATIONS  (PRN):  dextrose Oral Gel 15 Gram(s) Oral once PRN Blood Glucose LESS THAN 70 milliGRAM(s)/deciliter  ondansetron Injectable 4 milliGRAM(s) IV Push every 6 hours PRN Nausea and/or Vomiting      LABS :                           7.5    4.19  )-----------( 140      ( 11 May 2022 19:27 )             24.0     05-11    136  |  96<L>  |  28.0<H>  ----------------------------<  160<H>  4.8   |  29.0  |  4.93<H>    Ca    8.2<L>      11 May 2022 06:51  Phos  4.6     05-11  Mg     1.8     05-11    TPro  5.0<L>  /  Alb  2.4<L>  /  TBili  0.3<L>  /  DBili  x   /  AST  11  /  ALT  6   /  AlkPhos  58  05-11            PT/INR - ( 11 May 2022 03:44 )   PT: 14.7 sec;   INR: 1.26 ratio         PTT - ( 11 May 2022 03:44 )  PTT:29.4 sec    A/P   pt. is a 63 y/o F with a h/o ESRD on HD (M/W/F), CAD, CHFrEF (LVEF 40-45%), cirrhosis (?VELA, recurrent ascites requiring intermittent paracentesis), pAF (no a/c secondary to prior GIB), DM2, anemia, HTN, HLD, hypothyroid, presents to the ED with complaints of abdominal pain with radiation to back and n/v. Of note, she had a paracentesis performed yesterday with interventional radiology and 4750cc of ascites was removed. H/H found to be 6.7/22 in ED. CTA A/P reveals large volume ascites with large peritoneal hematoma.    - hemoperitoneum / peritoneal hematoma.  s/p paracentesis done yesterday, Hb stable for now, follow repeat in am, serial abd. exam, no abd. pain at this point. surgery team following.     - ESRD, s/p dialysis, nephrology team following.    - liver cirrhosis , VELA ? s/p peritoneal hematoma, empirically on iv rocephin to cover sbp.     - htn, continue metoprolol.       - compression boots for dvt prophylaxis at tis point.    hospitalist team will follow.     Addendum : while pt. was waiting for transfer out of [facility], had svt and got adenosine by [facility] attending who called me. pt. later in sinus rhythm. will call cardiology consult. pt. may stay in [facility] for now, hospitalist on call dr. Montano notified. dr. lechuga 12: 25 am. 5/12/22.      pt. seen and examined came with abd. pain, vomiting. pt. was admitted for large peritoneal hematoma. now pt. stable, reports no abd. pain, no n/v/d. no cp, no sob. lying in bed appears comfortable. had dialysis today, got 2 units of blood for hemoperitoneum. pt. VS stable and now downgraded to medical team. pt. has been evaluated by surgery team as well.   ROS : negative except for the ones mentioned above.    Hospital course :  pt. is a 61 y/o F with a h/o ESRD on HD (M/W/F), CAD, CHFrEF (LVEF 40-45%), cirrhosis (?VELA, recurrent ascites requiring intermittent paracentesis), pAF (no a/c secondary to prior GIB), DM2, anemia, HTN, HLD, hypothyroid, presents to the ED with complaints of abdominal pain with radiation to back and n/v. Of note, she had a paracentesis performed yesterday with interventional radiology and 4750cc of ascites was removed. H/H found to be 6.7/22 in ED. CTA A/P reveals large volume ascites with large peritoneal hematoma.    P/E   Vital Signs Last 24 Hrs  T(C): 36.7 (05-11-22 @ 19:19), Max: 37.6 (05-11-22 @ 00:25)  T(F): 98 (05-11-22 @ 19:19), Max: 99.7 (05-11-22 @ 00:25)  HR: 72 (05-11-22 @ 21:00) (71 - 94)  BP: 136/78 (05-11-22 @ 21:00) (106/55 - 146/73)  BP(mean): 95 (05-11-22 @ 21:00) (71 - 95)  RR: 22 (05-11-22 @ 21:00) (14 - 26)  SpO2: 100% (05-11-22 @ 21:00) (94% - 100%)    General: pt. in bed not in distress.  HEENT: AT, NC. PERRL. intact EOM. no throat erythema or exudate.   Neck: supple. no JVD.  Chest: CTA bilaterally  Heart: S1,S2. RRR. no heart murmur. no edema.   Abdomen: soft. non-tender. non-distended. umbilical hernia about 1.5 inch long and 0.5 inch wide, soft, non tender, reducible,+ BS.   Ext:  no calf tenderness, LUE av graft with thrill.  Neuro: AAO x3. no focal weakness.  psych : mood ok, no si/hi.  skin : no obvious rash, warm and dry.     MEDICATIONS  (STANDING):  cefTRIAXone   IVPB      chlorhexidine 2% Cloths 1 Application(s) Topical <User Schedule>  dextrose 5%. 1000 milliLiter(s) (100 mL/Hr) IV Continuous <Continuous>  dextrose 5%. 1000 milliLiter(s) (50 mL/Hr) IV Continuous <Continuous>  dextrose 50% Injectable 25 Gram(s) IV Push once  dextrose 50% Injectable 12.5 Gram(s) IV Push once  dextrose 50% Injectable 25 Gram(s) IV Push once  glucagon  Injectable 1 milliGRAM(s) IntraMuscular once  insulin lispro (ADMELOG) corrective regimen sliding scale   SubCutaneous every 6 hours  metoprolol tartrate 25 milliGRAM(s) Oral two times a day  pantoprazole  Injectable 40 milliGRAM(s) IV Push daily    MEDICATIONS  (PRN):  dextrose Oral Gel 15 Gram(s) Oral once PRN Blood Glucose LESS THAN 70 milliGRAM(s)/deciliter  ondansetron Injectable 4 milliGRAM(s) IV Push every 6 hours PRN Nausea and/or Vomiting      LABS :                           7.5    4.19  )-----------( 140      ( 11 May 2022 19:27 )             24.0     05-11    136  |  96<L>  |  28.0<H>  ----------------------------<  160<H>  4.8   |  29.0  |  4.93<H>    Ca    8.2<L>      11 May 2022 06:51  Phos  4.6     05-11  Mg     1.8     05-11    TPro  5.0<L>  /  Alb  2.4<L>  /  TBili  0.3<L>  /  DBili  x   /  AST  11  /  ALT  6   /  AlkPhos  58  05-11            PT/INR - ( 11 May 2022 03:44 )   PT: 14.7 sec;   INR: 1.26 ratio         PTT - ( 11 May 2022 03:44 )  PTT:29.4 sec    A/P   pt. is a 61 y/o F with a h/o ESRD on HD (M/W/F), CAD, CHFrEF (LVEF 40-45%), cirrhosis (?VELA, recurrent ascites requiring intermittent paracentesis), pAF (no a/c secondary to prior GIB), DM2, anemia, HTN, HLD, hypothyroid, presents to the ED with complaints of abdominal pain with radiation to back and n/v. Of note, she had a paracentesis performed yesterday with interventional radiology and 4750cc of ascites was removed. H/H found to be 6.7/22 in ED. CTA A/P reveals large volume ascites with large peritoneal hematoma.    - hemoperitoneum / peritoneal hematoma.  s/p paracentesis done yesterday, Hb stable for now, follow repeat in am, serial abd. exam, no abd. pain at this point. surgery team following.     - ESRD, s/p dialysis, nephrology team following.    - liver cirrhosis , VELA ? s/p peritoneal hematoma, empirically on iv rocephin to cover sbp.     - htn, continue metoprolol.       - compression boots for dvt prophylaxis at tis point.    hospitalist team will follow.     Addendum : while pt. was waiting for transfer out of [facility], had svt and got adenosine by [facility] attending who called me. pt. later in sinus rhythm. will call cardiology consult. pt. may stay in [facility] for now, hospitalist on call dr. Montano notified. echo and ekg ordered, [facility]ologist called. dr. lechuga 12: 25 am. 5/12/22.

## 2022-05-11 NOTE — H&P ADULT - MENTAL STATUS
answers questions and follow commands appropriately, moves all extremities with 5/5 strength, nonfocal

## 2022-05-11 NOTE — H&P ADULT - CRITICAL CARE ATTENDING COMMENT
62F w/ DM, HTN, ESRD on HD via L AVF, CAD, HFrEF (EF 40-45%), VELA cirrhosis refractory ascitics requiring LVP , pAF not on AC, GIB, hypothyroid, presented to ED w/ c/o abdominal pain, N/V after having a paracentesis by IR earlier on 05/10. ~ 5L drained.   H/H ~6.7/22 which was a drop forn ~ 10 a few months back. CTA w/ large R peritoneal hematoma and no active extravasation.   Transfued 1U pRBC, DDAVP x 1 given and observe in ICU for a couple of hrs. Surgery consulted

## 2022-05-11 NOTE — CONSULT NOTE ADULT - SUBJECTIVE AND OBJECTIVE BOX
Patient is a 62y old  Female who presents with a chief complaint of Hemoperitoneum (11 May 2022 05:01)       HPI:  61 y/o F with a h/o ESRD on HD (M/W/F), CAD, CHFrEF (LVEF 40-45%), cirrhosis (?VELA, recurrent ascites requiring intermittent paracentesis), pAF (no a/c secondary to prior GIB), DM2, anemia, HTN, HLD, hypothyroid, presents to the ED with complaints of abdominal pain with radiation to back and n/v. Of note, she had a paracentesis performed yesterday with interventional radiology and 4750cc of ascites was removed. H/H found to be 6.7/22 in ED. CTA A/P reveals large volume ascites with large peritoneal hematoma. The patient is a poor historian and is actively vomiting on evaluation.  (11 May 2022 05:01)       PAST MEDICAL & SURGICAL HISTORY:  DM (diabetes mellitus)      HTN (hypertension)      HLD (hyperlipidemia)      End stage renal disease on dialysis      Pericardial effusion      Pleural effusion      Chronic systolic congestive heart failure      Anemia due to chronic kidney disease, unspecified CKD stage      Paroxysmal atrial fibrillation      Coronary artery disease, angina presence unspecified, unspecified vessel or lesion type, unspecified whether native or transplanted heart      Diabetes      End stage renal disease      Encounter for dialysis catheter care      A-V fistula            FAMILY HISTORY:  Family history of kidney disease in brother (Sibling)    [place]    Social History:Non smoker    MEDICATIONS  (STANDING):  cefTRIAXone   IVPB      cefTRIAXone   IVPB 2000 milliGRAM(s) IV Intermittent once  chlorhexidine 2% Cloths 1 Application(s) Topical <User Schedule>  dextrose 5%. 1000 milliLiter(s) (100 mL/Hr) IV Continuous <Continuous>  dextrose 5%. 1000 milliLiter(s) (50 mL/Hr) IV Continuous <Continuous>  dextrose 50% Injectable 25 Gram(s) IV Push once  dextrose 50% Injectable 12.5 Gram(s) IV Push once  dextrose 50% Injectable 25 Gram(s) IV Push once  glucagon  Injectable 1 milliGRAM(s) IntraMuscular once  insulin lispro (ADMELOG) corrective regimen sliding scale   SubCutaneous every 6 hours  pantoprazole  Injectable 40 milliGRAM(s) IV Push daily    MEDICATIONS  (PRN):  dextrose Oral Gel 15 Gram(s) Oral once PRN Blood Glucose LESS THAN 70 milliGRAM(s)/deciliter  ondansetron Injectable 4 milliGRAM(s) IV Push every 6 hours PRN Nausea and/or Vomiting   Meds reviewed    Allergies    eggs (Anaphylaxis)  No Known Drug Allergies      REVIEW OF SYSTEM:     As above.    ALLERY AND IMMUNOLOGIC: No hives or eczema      Vital Signs Last 24 Hrs  T(C): 36.9 (11 May 2022 05:00), Max: 37.6 (11 May 2022 00:25)  T(F): 98.4 (11 May 2022 05:00), Max: 99.7 (11 May 2022 00:25)  HR: 82 (11 May 2022 05:00) (81 - 93)  BP: 127/63 (11 May 2022 05:00) (106/67 - 146/73)  BP(mean): 80 (11 May 2022 05:00) (80 - 80)  RR: 16 (11 May 2022 05:00) (16 - 20)  SpO2: 98% (11 May 2022 05:00) (94% - 100%)  Daily Height in cm: 160.02 (10 May 2022 18:27)    Daily Weight in k.2 (11 May 2022 05:00)    PHYSICAL EXAM:    GENERAL: appears chronically ill, oriented, in icu bed  HEAD:  Atraumatic, Normocephalic  EYES: EOMI, PERRLA, conjunctiva and sclera clear  ENMT: No tonsillar erythema, exudates, or enlargement; Moist mucous membranes,   NECK: Supple, neck  veins flat supine  NERVOUS SYSTEM:  Alert & Oriented X3, Good concentration; Motor Strength wnl upper and lower extremities;  CHEST/LUNG: Clear to percussion bilaterally; No rales, rhonchi, wheezing, or rubs  HEART: Regular rate and rhythm; No  rubs, or gallops, 1/6  systolic  murmur  ABDOMEN: Soft, Nontender, Nondistended; Bowel sounds present, body wall and flank edema  EXTREMITIES:  No edema, left upper arm access with  bruit  LYMPH: No lymphadenopathy noted  SKIN: No rashes or lesions, pale      LABS:                        6.7    6.20  )-----------( 261      ( 10 May 2022 19:50 )             22.1     05-10    135  |  94<L>  |  24.7<H>  ----------------------------<  457<HH>  4.9   |  28.0  |  4.06<H>    Ca    8.9      10 May 2022 19:50    TPro  5.4<L>  /  Alb  2.5<L>  /  TBili  0.4  /  DBili  x   /  AST  15  /  ALT  8   /  AlkPhos  68  05-10    PT/INR - ( 11 May 2022 03:44 )   PT: 14.7 sec;   INR: 1.26 ratio         PTT - ( 11 May 2022 03:44 )  PTT:29.4 sec            RADIOLOGY & ADDITIONAL TESTS:

## 2022-05-11 NOTE — H&P ADULT - HISTORY OF PRESENT ILLNESS
63 y/o F with a h/o ESRD on HD, CHF, 63 y/o F with a h/o ESRD on HD (M/W/F), CAD, CHFrEF (LVEF 40-45%), cirrhosis (?VELA, recurrent ascites requiring intermittent paracentesis), pAF (no a/c secondary to prior GIB), DM2, anemia, HTN, HLD, hypothyroid, presents to the ED with complaints of abdominal pain with radiation to back and n/v. Of note, she had a paracentesis performed yesterday with interventional radiology and 4750cc of ascites was removed. H/H found to be 6.7/22 in ED. CTA A/P reveals large volume ascites with large peritoneal hematoma. The patient is a poor historian and is actively vomiting on evaluation.

## 2022-05-11 NOTE — CONSULT NOTE ADULT - SUBJECTIVE AND OBJECTIVE BOX
GENERAL SURGERY CONSULT NOTE  --------------------------------------------------------------------------------------------    HPI: Patient is a 62y old  Female with pmh significant for CHF, DM, CAD, ESRD, afib, ?cirrhosis who presents with a chief complaint of abdominal pain. Patient with regular paracentesis q2wks for large volume ascites of unknown etiology, possible cirrhosis, underwent regular paracentesis on 5/10 and afterwards started experiencing abdominal pain. Pain a/w n/v. States she felt pain was improving but then it began worsening again so she presented to ED.      PAST MEDICAL & SURGICAL HISTORY:  DM (diabetes mellitus)  HTN (hypertension)  HLD (hyperlipidemia)  End stage renal disease on dialysis  Pericardial effusion  Pleural effusion  Chronic systolic congestive heart failure  Anemia due to chronic kidney disease, unspecified CKD stage  Paroxysmal atrial fibrillation  Coronary artery disease, angina presence unspecified, unspecified vessel or lesion type, unspecified whether native or transplanted heart  Diabetes  End stage renal disease  Encounter for dialysis catheter care      A-V fistula  A-V fistula        FAMILY HISTORY:  Family history of kidney disease in brother (Sibling)    [] Family history not pertinent as reviewed with the patient and family    SOCIAL HISTORY:      ALLERGIES: eggs (Anaphylaxis)  No Known Drug Allergies      HOME MEDICATIONS:     CURRENT MEDICATIONS  MEDICATIONS (STANDING):   MEDICATIONS (PRN):  --------------------------------------------------------------------------------------------    Vitals:   T(C): 37.6 (05-11-22 @ 00:25), Max: 37.6 (05-11-22 @ 00:25)  HR: 85 (05-11-22 @ 00:25) (85 - 93)  BP: 131/86 (05-11-22 @ 00:25) (106/67 - 131/86)  RR: 18 (05-11-22 @ 00:25) (18 - 18)  SpO2: 100% (05-11-22 @ 00:25) (94% - 100%)  CAPILLARY BLOOD GLUCOSE      POCT Blood Glucose.: 423 mg/dL (10 May 2022 21:44)  POCT Blood Glucose.: 451 mg/dL (10 May 2022 18:22)      Height (cm): 160 (05-10 @ 18:27)  Weight (kg): 70.3 (05-10 @ 18:27)  BMI (kg/m2): 27.5 (05-10 @ 18:27)  BSA (m2): 1.74 (05-10 @ 18:27)      PHYSICAL EXAM:   General: NAD, Lying in bed comfortably  Cardio: RRR  Resp: Good effort, no increased wob  GI/Abd: Soft, tender to palpation throughout abdomen, no rebound or guarding  Vascular: palpable LUE fistula    --------------------------------------------------------------------------------------------    LABS  CBC (05-10 @ 19:50)                              6.7<LL>                         6.20    )----------------(  261        84.3<H>% Neutrophils, 8.7<L>% Lymphocytes, ANC: 5.23                                22.1<L>    BMP (05-10 @ 19:50)             135     |  94<L>   |  24.7<H>		Ca++ --      Ca 8.9                ---------------------------------( 457<HH>		Mg --                 4.9     |  28.0    |  4.06<H>			Ph --        LFTs (05-10 @ 19:50)      TPro 5.4<L> / Alb 2.5<L> / TBili 0.4 / DBili -- / AST 15 / ALT 8 / AlkPhos 68          VBG (05-10 @ 19:51)     7.400 / 54<H> / <42 / 33<H> / 8.6<H> / 71.9%     Lactate: 3.90<H>    --------------------------------------------------------------------------------------------    IMAGING  < from: CT Abdomen and Pelvis w/ IV Cont (05.10.22 @ 23:45) >  FINDINGS:  LOWER CHEST: Cardiomegaly. Trace right pleural effusion.   Atelectasis/scarring right greater than left lung base. Groundglass   opacities with interlobular septal thickening of the right lung base.  LIVER: No hepatic lesions.  BILE DUCTS: Normal caliber.  GALLBLADDER: Within normal limits.  SPLEEN: Within normal limits.  PANCREAS: Within normal limits.  ADRENALS: Within normal limits.  KIDNEYS/URETERS: Small kidneys. No hydronephrosis. Bilateral renal cysts.   Additional tiny low-attenuation renal structures are too small to   characterize.    BLADDER: Collapsed.  REPRODUCTIVE ORGANS: Uterus is present. No adnexal mass.    BOWEL: Evaluation of bowel limited without distention with oral contrast,   however there is no bowel obstruction. Small bowel loops are not dilated.   Small bowel anastomosis again seen. Stomach is underdistended for   adequate evaluation. Fluid noted within the distal esophagus.  PERITONEUM: There is a large volume abdominal and pelvic ascites. In   addition there is a large region of increased density superimposed on the   ascites within the right abdomen seen adjacent to the hepatic tip   extending down the right paracolic gutter and into the right pelvic   region. These findings suggest superimposed hemorrhage.  VESSELS:  There is no abdominal aortic aneurysm or dissection..  ABDOMINAL WALL: Incidental hernia again seen containing fluid.   Subcutaneous edema.  BONES: No fractures ofthe left L3 and L4 transverse processes again seen.    IMPRESSION:    There is a large volume of abdominal and pelvic ascites. However, there   is a large region of increased density superimposed on the ascites within   the right abdomen extending from the hepatic tip down the right paracolic   cutter and into the right pelvic region, consistent with   hemorrhage/hemorrhagic ascites.    < end of copied text >      --------------------------------------------------------------------------------------------

## 2022-05-12 NOTE — PROGRESS NOTE ADULT - ATTENDING COMMENTS
62yf hemoperitoneum s/p paracentesis 2 days ago, nontoxic appearing, abd exam benign, inappropriate response to transfusion hemodilution vs blood loss anemia, no signs of active bleeding on CTA, yet Hgb response to PRBC inadequate after 3 U PRBC  Awake alert  Bilateral BS  Abdomen soft and mildly distended  Patient is renal patient and by definition hypocoagulable with altered PLT function  After 3 U PRBC, we will watch carefully, for this patient may need surgical exploration of bleeding continues    PLAN:    - No emergent surgical intervention at this time  -Rec [id number]  -Hb>7.0 or >8.0 if symptomatic

## 2022-05-12 NOTE — PROGRESS NOTE ADULT - SUBJECTIVE AND OBJECTIVE BOX
NEPHROLOGY INTERVAL HPI/OVERNIGHT EVENTS:    No new events.    MEDICATIONS  (STANDING):  atorvastatin 40 milliGRAM(s) Oral at bedtime  calcium acetate 667 milliGRAM(s) Oral three times a day with meals  cefTRIAXone   IVPB      cefTRIAXone   IVPB 2000 milliGRAM(s) IV Intermittent every 24 hours  dextrose 5%. 1000 milliLiter(s) (100 mL/Hr) IV Continuous <Continuous>  dextrose 5%. 1000 milliLiter(s) (50 mL/Hr) IV Continuous <Continuous>  dextrose 50% Injectable 25 Gram(s) IV Push once  dextrose 50% Injectable 12.5 Gram(s) IV Push once  dextrose 50% Injectable 25 Gram(s) IV Push once  glucagon  Injectable 1 milliGRAM(s) IntraMuscular once  hydrALAZINE 25 milliGRAM(s) Oral every 12 hours  insulin lispro (ADMELOG) corrective regimen sliding scale   SubCutaneous three times a day before meals  levothyroxine 50 MICROGram(s) Oral daily  magnesium oxide 400 milliGRAM(s) Oral with lunch  metoprolol tartrate 50 milliGRAM(s) Oral two times a day  pantoprazole    Tablet 40 milliGRAM(s) Oral before breakfast    MEDICATIONS  (PRN):  dextrose Oral Gel 15 Gram(s) Oral once PRN Blood Glucose LESS THAN 70 milliGRAM(s)/deciliter  metoprolol tartrate Injectable 5 milliGRAM(s) IV Push every 6 hours PRN HR> 120s  ondansetron Injectable 4 milliGRAM(s) IV Push every 6 hours PRN Nausea and/or Vomiting      Allergies    eggs (Anaphylaxis)  No Known Drug Allergies    Intolerances        Vital Signs Last 24 Hrs  T(C): 36.9 (12 May 2022 06:00), Max: 36.9 (11 May 2022 23:41)  T(F): 98.4 (12 May 2022 06:00), Max: 98.4 (11 May 2022 23:41)  HR: 62 (12 May 2022 06:00) (62 - 140)  BP: 137/64 (12 May 2022 06:00) (97/74 - 142/67)  BP(mean): 99 (12 May 2022 05:00) (75 - 100)  RR: 18 (12 May 2022 06:00) (10 - 30)  SpO2: 100% (12 May 2022 06:00) (91% - 100%)  Daily     Daily Weight in k.7 (11 May 2022 17:30)    PHYSICAL EXAM:    GENERAL: appears  chronically ill in bed  HEAD:  wnl  EYES:   ENMT:   NECK: veins  wnl  NERVOUS SYSTEM: alert, verbal   CHEST/LUNG: Decreased bs bases  HEART: no rub noted  ABDOMEN: same  EXTREMITIES: Arm access with bruit   LYMPH:   SKIN: pale    LABS:                        8.4    5.25  )-----------( 187      ( 12 May 2022 04:57 )             26.6         138  |  98  |  13.6  ----------------------------<  102<H>  3.9   |  27.0  |  3.31<H>    Ca    8.1<L>      12 May 2022 04:57  Phos  3.7       Mg     2.1         TPro  5.6<L>  /  Alb  2.5<L>  /  TBili  0.3<L>  /  DBili  x   /  AST  13  /  ALT  6   /  AlkPhos  61  12    PT/INR - ( 12 May 2022 04:57 )   PT: 13.9 sec;   INR: 1.20 ratio         PTT - ( 12 May 2022 04:57 )  PTT:28.1 sec    Magnesium, Serum: 2.1 mg/dL ( @ 04:57)  Phosphorus Level, Serum: 3.7 mg/dL ( @ 04:57)          RADIOLOGY & ADDITIONAL TESTS:

## 2022-05-12 NOTE — PROGRESS NOTE ADULT - ASSESSMENT
A) S/P retroperitoneal bleed right, Anemia, DM 2 with CRF 5.    P) ECHO; HD  tomorrow with epo.; labs.

## 2022-05-12 NOTE — PROGRESS NOTE ADULT - SUBJECTIVE AND OBJECTIVE BOX
INTERVAL HPI/OVERNIGHT EVENTS:  Patient was seen and examined at bedside this AM.  Overnight events noted, pt pending transfer out of MICU, had SVT, cardioverted with adenosine, currently denies worsening abd pain. Received 3U PRBC yest with innapproriate response Hb 6.7-7.5. HDnl except when in arrythmia.       MEDICATIONS  (STANDING):  aDENosine Injectable (ADENOCARD) 6 milliGRAM(s) IV Push once  atorvastatin 40 milliGRAM(s) Oral at bedtime  calcium acetate 667 milliGRAM(s) Oral three times a day with meals  cefTRIAXone   IVPB      cefTRIAXone   IVPB 2000 milliGRAM(s) IV Intermittent every 24 hours  dextrose 5%. 1000 milliLiter(s) (100 mL/Hr) IV Continuous <Continuous>  dextrose 5%. 1000 milliLiter(s) (50 mL/Hr) IV Continuous <Continuous>  dextrose 50% Injectable 25 Gram(s) IV Push once  dextrose 50% Injectable 12.5 Gram(s) IV Push once  dextrose 50% Injectable 25 Gram(s) IV Push once  glucagon  Injectable 1 milliGRAM(s) IntraMuscular once  hydrALAZINE 25 milliGRAM(s) Oral every 12 hours  insulin lispro (ADMELOG) corrective regimen sliding scale   SubCutaneous three times a day before meals  levothyroxine 50 MICROGram(s) Oral daily  metoprolol tartrate 50 milliGRAM(s) Oral two times a day  pantoprazole    Tablet 40 milliGRAM(s) Oral before breakfast    MEDICATIONS  (PRN):  dextrose Oral Gel 15 Gram(s) Oral once PRN Blood Glucose LESS THAN 70 milliGRAM(s)/deciliter  metoprolol tartrate Injectable 5 milliGRAM(s) IV Push every 6 hours PRN HR> 120s  ondansetron Injectable 4 milliGRAM(s) IV Push every 6 hours PRN Nausea and/or Vomiting      Vital Signs Last 24 Hrs  T(C): 36.9 (11 May 2022 23:41), Max: 37.2 (11 May 2022 03:00)  T(F): 98.4 (11 May 2022 23:41), Max: 99 (11 May 2022 03:00)  HR: 71 (12 May 2022 00:15) (71 - 140)  BP: 131/66 (12 May 2022 00:15) (97/74 - 146/73)  BP(mean): 85 (12 May 2022 00:15) (71 - 100)  RR: 20 (12 May 2022 00:15) (10 - 30)  SpO2: 100% (12 May 2022 00:15) (91% - 100%)    Physical Exam:  Gen: NAD  Neurological:  No sensory/motor deficits  HEENT: PERRLA, EOMI  Respiratory: Breath Sounds equal & CTA bilaterally, no accessory muscle use  Cardiovascular: Regular rate & rhythm, normal S1, S2; no murmurs, gallops or rubs  Gastrointestinal: Soft, non-tender, nondistended  Musculoskeletal: No joint pain, swelling or deformity; no limitation of movement  Skin: No rashes      I&O's Detail    11 May 2022 07:01  -  12 May 2022 00:58  --------------------------------------------------------  IN:    PRBCs (Packed Red Blood Cells): 297 mL  Total IN: 297 mL    OUT:    Other (mL): 500 mL    Voided (mL): 0 mL  Total OUT: 500 mL    Total NET: -203 mL          LABS:                        7.5    4.19  )-----------( 140      ( 11 May 2022 19:27 )             24.0     05-11    136  |  96<L>  |  28.0<H>  ----------------------------<  160<H>  4.8   |  29.0  |  4.93<H>    Ca    8.2<L>      11 May 2022 06:51  Phos  4.6     05-11  Mg     1.8     05-11    TPro  5.0<L>  /  Alb  2.4<L>  /  TBili  0.3<L>  /  DBili  x   /  AST  11  /  ALT  6   /  AlkPhos  58  05-11    PT/INR - ( 11 May 2022 03:44 )   PT: 14.7 sec;   INR: 1.26 ratio         PTT - ( 11 May 2022 03:44 )  PTT:29.4 sec      RADIOLOGY & ADDITIONAL STUDIES:

## 2022-05-12 NOTE — CHART NOTE - NSCHARTNOTEFT_GEN_A_CORE
Called to bedside after pt was in sustained tachycardia in 140s. EKG revealed SVT, likely AVNRT. Vagal manoeuvres did not have any effect. Pt was placed on continuos monitor w/ Zoll pads on and Adenosine 6mg was pushed. Pt reverted back into sinus rhythm and was asymptomatic. On metoprolol 25mg bid, will increase dose to 50mg bid and start IV 5mg PRN for HR >120  Recent labs stable  Pt was already downgraded to medical service. Attending on case updated

## 2022-05-12 NOTE — PROGRESS NOTE ADULT - SUBJECTIVE AND OBJECTIVE BOX
Patient is a 62y old  Female who presents with a chief complaint of Hemoperitoneum (12 May 2022 10:51)    Patient seen and examined at bedside.     ALLERGIES:  eggs (Anaphylaxis)  No Known Drug Allergies    MEDICATIONS  (STANDING):  atorvastatin 40 milliGRAM(s) Oral at bedtime  calcium acetate 667 milliGRAM(s) Oral three times a day with meals  cefTRIAXone   IVPB      cefTRIAXone   IVPB 2000 milliGRAM(s) IV Intermittent every 24 hours  dextrose 5%. 1000 milliLiter(s) (100 mL/Hr) IV Continuous <Continuous>  dextrose 5%. 1000 milliLiter(s) (50 mL/Hr) IV Continuous <Continuous>  dextrose 50% Injectable 25 Gram(s) IV Push once  dextrose 50% Injectable 12.5 Gram(s) IV Push once  dextrose 50% Injectable 25 Gram(s) IV Push once  epoetin gian-epbx (RETACRIT) Injectable 51497 Unit(s) IV Push <User Schedule>  glucagon  Injectable 1 milliGRAM(s) IntraMuscular once  hydrALAZINE 25 milliGRAM(s) Oral every 12 hours  insulin lispro (ADMELOG) corrective regimen sliding scale   SubCutaneous three times a day before meals  levothyroxine 50 MICROGram(s) Oral daily  metoprolol tartrate 50 milliGRAM(s) Oral two times a day  pantoprazole    Tablet 40 milliGRAM(s) Oral before breakfast    MEDICATIONS  (PRN):  dextrose Oral Gel 15 Gram(s) Oral once PRN Blood Glucose LESS THAN 70 milliGRAM(s)/deciliter  metoprolol tartrate Injectable 5 milliGRAM(s) IV Push every 6 hours PRN HR> 120s  ondansetron Injectable 4 milliGRAM(s) IV Push every 6 hours PRN Nausea and/or Vomiting    Vital Signs Last 24 Hrs  T(F): 98.5 (12 May 2022 16:26), Max: 98.6 (12 May 2022 11:47)  HR: 69 (12 May 2022 16:26) (62 - 140)  BP: 153/74 (12 May 2022 16:26) (97/74 - 153/74)  RR: 18 (12 May 2022 16:26) (10 - 30)  SpO2: 96% (12 May 2022 16:26) (91% - 100%)  I&O's Summary    11 May 2022 07:01  -  12 May 2022 07:00  --------------------------------------------------------  IN: 297 mL / OUT: 500 mL / NET: -203 mL      PHYSICAL EXAM:  General: NAD, A/O x 3  ENT: MMM, no thrush  Neck: Supple, No JVD  Lungs: Clear to auscultation bilaterally, good air entry, non-labored breathing  Cardio: +s1/s2  Abdomen: Soft, +distended; Bowel sounds present  Extremities: No calf tenderness    LABS:                        8.4    5.25  )-----------( 187      ( 12 May 2022 04:57 )             26.6     05-12    138  |  98  |  13.6  ----------------------------<  102  3.9   |  27.0  |  3.31    Ca    8.1      12 May 2022 04:57  Phos  3.7     05-12  Mg     2.1     05-12    TPro  5.6  /  Alb  2.5  /  TBili  0.3  /  DBili  x   /  AST  13  /  ALT  6   /  AlkPhos  61  05-12    Amylase, Serum Total: 40 U/L (05-11-22 @ 12:44)    Lipase, Serum: 6 U/L (05-11-22 @ 12:44)  Lipase, Serum: 18 U/L (05-10-22 @ 19:50)    PT/INR - ( 12 May 2022 04:57 )   PT: 13.9 sec;   INR: 1.20 ratio    PTT - ( 12 May 2022 04:57 )  PTT:28.1 sec    Lactate, Blood: 1.0 mmol/L (05-11 @ 06:51)    19:51 - VBG - pH: 7.400 | pCO2: 54    | pO2: <42   | Lactate: 3.90     Glucose  POCT Blood Glucose.: 192 mg/dL (12 May 2022 11:42)  POCT Blood Glucose.: 140 mg/dL (12 May 2022 08:59)  POCT Blood Glucose.: 186 mg/dL (12 May 2022 00:24)  POCT Blood Glucose.: 104 mg/dL (11 May 2022 17:52)    RADIOLOGY & ADDITIONAL TESTS:  < from: Xray Abdomen 1 View Portable, IMMEDIATE (Xray Abdomen 1 View Portable, IMMEDIATE .) (05.12.22 @ 13:04) >  IMPRESSION:  Nonobstructive bowel gas pattern/constipation, unchanged  < end of copied text >    < from: US Abdomen Upper Quadrant Right (05.11.22 @ 19:52) >  IMPRESSION:  Flow with normal directionality in the portal vein and its branches and   in the hepatic veins.  Ascites  Small right kidney  < end of copied text >    < from: CT Abdomen and Pelvis w/wo IV Cont (05.11.22 @ 04:25) >  IMPRESSION:  The etiologyfor the right hemoperitoneum is not delineated on this   examination. Large right hemoperitoneum/hematoma unchanged. Large volume   ascites unchanged.  Mild pulmonary edema.  A preliminary report was provided by Dr. Deisy Buitrago I agree with   the interpretation.  < end of copied text >    < from: CT Abdomen and Pelvis w/ IV Cont (05.10.22 @ 23:45) >  IMPRESSION:  There is a large volume of abdominal and pelvic ascites. However, there   is a large region of increased density superimposed on the ascites within   the right abdomen extending from the hepatic tip down the right paracolic   cutter and into the right pelvic region, consistent with   hemorrhage/hemorrhagic ascites.  < end of copied text >    < from: TTE Echo Complete w/o Contrast w/ Doppler (05.12.22 @ 10:31) >  Summary:   1. Left ventricular ejection fraction, by visual estimation, is 40 to   45%.   2. Mildly decreased global left ventricular systolic function.   3. Elevated left ventricular end-diastolic pressure.   4. Spectral Doppler shows pseudonormal pattern of left ventricular   myocardial filling (Grade II diastolic dysfunction).   5. There is moderate eccentric left ventricular hypertrophy.   6. Mildly reduced RV systolic function.   7. Moderately enlarged right ventricle.   8. Mildly enlarged left atrium.   9. Moderately enlarged right atrium.  10. Moderate ascites.  11. Moderate pleural effusion in both left and right lateral regions.  12. Mild thickening of the anterior and posterior mitral valve leaflets.  13. Moderate mitral valve regurgitation.  14. Moderate tricuspid regurgitation.  15. Mild pulmonic valve regurgitation.  < end of copied text >

## 2022-05-12 NOTE — CHART NOTE - NSCHARTNOTEFT_GEN_A_CORE
while pt. was waiting for transfer out of micu, had svt and got adenosine by MICU attending who called me. pt. later in sinus rhythm. will call cardiology consult. pt. may stay in micu for now, hospitalist on call dr. Montano notified. dr. lechuga 12: 25 am. 5/12/22. while pt. was waiting for transfer out of micu, had svt and got adenosine by MICU attending who called me. pt. later in sinus rhythm. will call cardiology consult. pt. may stay in micu for now, hospitalist on call dr. Montano notified.  echo ordered, continue pt's metoprolol.   dr. lechuga 12: 25 am. 5/12/22. while pt. was waiting for transfer out of micu, had svt and got adenosine by MICU attending who called me. pt. later in sinus rhythm. will call cardiology consult. pt. may stay in micu for now, hospitalist on call dr. Montano notified.  echo, ekg , ordered, continue pt's metoprolol. pt's cardiologist [facility] called.   dr. lechuga 12: 25 am. 5/12/22.

## 2022-05-12 NOTE — CONSULT NOTE ADULT - ASSESSMENT
63 yo F w/ h/o mild NICM (LVEF 40-45% by TTE in 02/2021; 2019: negative LHC), h/o PAF (2017 [facility]: brief inpatient episode likely secondary to medical procedure; MCOT 28-day in 03/2021: 0 episodes of AF, therefore A/C discontinued), ESRD, cirrhosis (~VELA; Q 2week paracentesis regimen), DM, HTN, HLD who presented to [facility] ED on 05/10 with abdominal pain/hemoperitoneum/peritoneal hematoma in the setting of IR large volume paracentesis that same day. Patient was triaged to MICU (downgraded to tele 05/11) where, last night, patient reportedly had SVT documented on ECG that broke w/ IV adenosine.     1. SVT  2. PAF  3. NICM  4. ESRD  5. Hemoperitoneum    *05/12:    PLAN:    1.  2.  3.       61 yo F w/ h/o mild NICM (LVEF 40-45% by TTE in 02/2021; 2019: negative LHC), h/o PAF (2017 [facility]: brief inpatient episode likely secondary to medical procedure; MCOT 28-day in 03/2021: 0 episodes of AF, therefore A/C discontinued), ESRD, cirrhosis (~VELA; Q 2week paracentesis regimen), DM, HTN, HLD who presented to [facility] ED on 05/10 with abdominal pain/hemoperitoneum/peritoneal hematoma in the setting of IR large volume paracentesis that same day. Patient was triaged to MICU (downgraded to tele 05/11) where, last night, patient reportedly had SVT documented on ECG that broke w/ IV adenosine.     1. SVT  2. PAF  3. NICM  4. ESRD  5. Hemoperitoneum    *05/12:    PLAN:    1.  2.  3.  4..        61 yo F w/ h/o mild NICM (LVEF 40-45% by TTE in 02/2021; 2019: negative LHC), h/o PAF (2017 [facility]: brief inpatient episode likely secondary to medical procedure; MCOT 28-day in 03/2021: 0 episodes of AF, therefore A/C discontinued), ESRD, cirrhosis (~VELA; Q 2week paracentesis regimen), DM, HTN, HLD who presented to [facility] ED on 05/10 with abdominal pain/hemoperitoneum/peritoneal hematoma in the setting of IR large volume paracentesis that same day. Patient was triaged to MICU (downgraded to tele 05/11) where, last night, patient reportedly had SVT documented on ECG that broke w/ IV adenosine.     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 PAF  3. NICM: TTE 05/12 similar to past  4. ESRD  5. 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 Agree with chronic suppressive therapy with PO metoprolol and will follow as outpatient to sumit the need/desire for definitive therapy with catheter ablation   2. Employ IV adenosine/IV metoprolol/IV diltiazem as needed/as appropriate/efficacious for recurrence   3. Monitor on telemetry   4. Cardiology will follow        63 yo F w/ h/o mild NICM (LVEF 40-45% by TTE in 02/2021; 2019: negative LHC), h/o PAF (2017 [facility]: brief inpatient episode likely secondary to medical procedure; MCOT 28-day in 03/2021: 0 episodes of AF, therefore A/C discontinued), ESRD, cirrhosis (~VELA; Q 2week paracentesis regimen), DM, HTN, HLD who presented to [facility] ED on 05/10 with abdominal pain/hemoperitoneum/peritoneal hematoma in the setting of IR large volume paracentesis that same day. Patient was triaged to MICU (downgraded to tele 05/11) where, last night, patient reportedly had SVT documented on ECG that broke w/ IV adenosine.     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 PAF  3. NICM: TTE 05/12 similar to past  4. ESRD  5. 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 Agree with chronic suppressive therapy with PO metoprolol and will follow as outpatient to sumit the need/desire for definitive therapy with catheter ablation   2. Employ IV adenosine/IV metoprolol/IV diltiazem as needed/appropriate/efficacious for recurrence   3. Monitor on telemetry   4. Cardiology will follow

## 2022-05-12 NOTE — PROGRESS NOTE ADULT - ASSESSMENT
62yf hemoperitoneum s/p paracentesis earlier today, nontoxic appearing, abd exam benign, inappropriate response to transfusion hemodilution vs blood loss anemia, no signs of active bleeding on CTA    PLAN:    - No emergent surgical intervention at this time  -Rec [id number]  -Hb>7.0 or >8.0 if symptomatic

## 2022-05-12 NOTE — CHART NOTE - NSCHARTNOTEFT_GEN_A_CORE
Called by RN to report critical value - troponin 0.64.  Per RN patient is in NAD, VSS, resting comfortably in bed without complaints.  Provider immediately assessed patient at bedside, patient is noted to be in NAD, VSS, resting comfortably in bed without complaints.  Patient adamantly denies: chest pain, palpitations, SOB, difficulty breathing, lightheadedness, dizziness or weakness.     Vital Signs Last 24 Hrs  T(C): 36.9 (12 May 2022 06:00), Max: 36.9 (11 May 2022 23:41)  T(F): 98.4 (12 May 2022 06:00), Max: 98.4 (11 May 2022 23:41)  HR: 62 (12 May 2022 06:00) (62 - 140)  BP: 137/64 (12 May 2022 06:00) (97/74 - 142/67)  BP(mean): 99 (12 May 2022 05:00) (75 - 100)  RR: 18 (12 May 2022 06:00) (10 - 30)  SpO2: 100% (12 May 2022 06:00) (91% - 100%)    Physical Exam:  General: NAD, NCAT, resting comfortably in bed  Cardio: normal S1, S2, no MRG appreciated  Lungs: CTAB, no abnormal breath sounds appreciated, respirations unlabored on 2L NC   Abdomen: soft, non-tender, non-distended, +BS  Extremities: no pedal edema appreciated  Neuro: AOX4, no focal deficits appreciated     A/P:  -STAT EKG ordered  -RN to call EKG tech to have EKG performed, RN to please call provider once EKG is done for review  -Cardio consulted ([facility]) called overnight by adebayo de santiago appreciated  -STAT CXR  -TTE changed from routine to STAT.  Patient must go on monitor  -KCL 10meq x 1 STAT  -Mg Oxide 400mg x 2 with breakfast and lunch given recent history of SVT yesterday requiring Called by RN to report critical value - troponin 0.64.  Per RN patient is in NAD, VSS, resting comfortably in bed without complaints.  Provider immediately assessed patient at bedside, patient is noted to be in NAD, VSS, resting comfortably in bed without complaints.  Patient adamantly denies: chest pain, palpitations, SOB, difficulty breathing, lightheadedness, dizziness or weakness.     Vital Signs Last 24 Hrs  T(C): 36.9 (12 May 2022 06:00), Max: 36.9 (11 May 2022 23:41)  T(F): 98.4 (12 May 2022 06:00), Max: 98.4 (11 May 2022 23:41)  HR: 62 (12 May 2022 06:00) (62 - 140)  BP: 137/64 (12 May 2022 06:00) (97/74 - 142/67)  BP(mean): 99 (12 May 2022 05:00) (75 - 100)  RR: 18 (12 May 2022 06:00) (10 - 30)  SpO2: 100% (12 May 2022 06:00) (91% - 100%)    Physical Exam:  General: NAD, NCAT, resting comfortably in bed  Cardio: normal S1, S2, no MRG appreciated  Lungs: CTAB, no abnormal breath sounds appreciated, respirations unlabored on 2L NC   Abdomen: soft, non-tender, non-distended, +BS  Extremities: no pedal edema appreciated  Neuro: AOX4, no focal deficits appreciated     A/P:  -STAT EKG ordered  -RN to call EKG tech to have EKG performed, RN to please call provider once EKG is done for review.  EKG reviewed and is grossly unchanged compared to prior study from yesterday.   -Cardio consulted ([facility]) called overnight by adebayo de santiago appreciated  -STAT CXR  -TTE changed from routine to STAT.  Patient must go on monitor.  -KCL 10meq x 1 STAT  -Mg Oxide 400mg x 2 with breakfast and lunch given recent history of SVT yesterday requiring adenosine  -Repeat troponins ordered for today @ 15:00 and 21:00  -AM labs ordered: CBC / BMP / Mg / Phos / Albumin / Troponin  -RN to continue to monitor  -continue tele  -RN to call provider with any acute changes/ questions / concerns  [phone]      The above patient and plan was d/w Dr. Lloyd and EARNEST Jones.

## 2022-05-12 NOTE — CONSULT NOTE ADULT - SUBJECTIVE AND OBJECTIVE BOX
1. Consult request received  2. Patient will be seen later this morning  Patient is a 62y old  Female who presents with a chief complaint of Hemoperitoneum (12 May 2022 16:20)        PAST MEDICAL & SURGICAL HISTORY:  DM (diabetes mellitus)      HTN (hypertension)      HLD (hyperlipidemia)      End stage renal disease on dialysis      Pericardial effusion      Pleural effusion      Chronic systolic congestive heart failure      Anemia due to chronic kidney disease, unspecified CKD stage      Paroxysmal atrial fibrillation      Coronary artery disease, angina presence unspecified, unspecified vessel or lesion type, unspecified whether native or transplanted heart      Diabetes      End stage renal disease      Encounter for dialysis catheter care      A-V fistula      A-V fistula          Summary of admission HPI:    63 yo F w/ h/o mild NICM (LVEF 40-45% by TTE in 2021; 2019: negative LHC), h/o PAF (2017 [facility]: brief inpatient episode likely secondary to medical procedure; MCOT 28-day in 2021: 0 episodes of AF, therefore A/C discontinued), ESRD, cirrhosis (~VELA; Q 2week paracentesis regimen), DM, HTN, HLD who presented to [facility] ED on 05/10 with abdominal pain/hemoperitoneum/peritoneal hematoma in the setting of IR large volume paracentesis that same day. Patient was triaged to MICU (downgraded to tele ) where, last night, patient reportedly had SVT documented on ECG that broke w/ IV adenosine.             CHEST CT PULMONARY ANGIO with IV Contrast:  FINDINGS:  MEDICATIONS  (STANDING):  atorvastatin 40 milliGRAM(s) Oral at bedtime  calcium acetate 667 milliGRAM(s) Oral three times a day with meals  cefTRIAXone   IVPB      cefTRIAXone   IVPB 2000 milliGRAM(s) IV Intermittent every 24 hours  dextrose 5%. 1000 milliLiter(s) (100 mL/Hr) IV Continuous <Continuous>  dextrose 5%. 1000 milliLiter(s) (50 mL/Hr) IV Continuous <Continuous>  dextrose 50% Injectable 25 Gram(s) IV Push once  dextrose 50% Injectable 12.5 Gram(s) IV Push once  dextrose 50% Injectable 25 Gram(s) IV Push once  epoetin gian-epbx (RETACRIT) Injectable 17474 Unit(s) IV Push <User Schedule>  glucagon  Injectable 1 milliGRAM(s) IntraMuscular once  hydrALAZINE 25 milliGRAM(s) Oral every 12 hours  insulin lispro (ADMELOG) corrective regimen sliding scale   SubCutaneous three times a day before meals  levothyroxine 50 MICROGram(s) Oral daily  metoprolol tartrate 50 milliGRAM(s) Oral two times a day  pantoprazole    Tablet 40 milliGRAM(s) Oral before breakfast    MEDICATIONS  (PRN):  dextrose Oral Gel 15 Gram(s) Oral once PRN Blood Glucose LESS THAN 70 milliGRAM(s)/deciliter  metoprolol tartrate Injectable 5 milliGRAM(s) IV Push every 6 hours PRN HR> 120s  ondansetron Injectable 4 milliGRAM(s) IV Push every 6 hours PRN Nausea and/or Vomiting      FAMILY HISTORY:  Family history of kidney disease in brother (Sibling)    REVIEW OF SYSTEMS:    CONSTITUTIONAL: No fever, weight loss, chills, shakes, or fatigue  EYES: No eye pain, visual disturbances, or discharge  ENMT:  No difficulty hearing, tinnitus, vertigo; No sinus or throat painn  PSYCHIATRIC: No depression, anxiety, or difficulty sleeping  HEME/LYMPH: No easy bruising or bleeding gums  ALLERY AND IMMUNOLOGIC: No hives or rash.      Vital Signs Last 24 Hrs  T(C): 36.9 (12 May 2022 16:26), Max: 37 (12 May 2022 11:47)  T(F): 98.5 (12 May 2022 16:26), Max: 98.6 (12 May 2022 11:47)  HR: 69 (12 May 2022 16:26) (62 - 140)  BP: 153/74 (12 May 2022 16:26) (97/74 - 153/74)  BP(mean): 99 (12 May 2022 05:00) (80 - 100)  RR: 18 (12 May 2022 16:26) (10 - 30)  SpO2: 96% (12 May 2022 16:26) (91% - 100%)    PHYSICAL EXAM:    GENERAL: In no apparent distress, well nourished, and hydrated.  HEART: Regular rate and rhythm; No murmurs, rubs, or gallops.  PULMONARY: Clear to auscultation and perfusion.  No rales, wheezing, or rhonchi bilaterally.  EXTREMITIES:  2+ Peripheral Pulses, No clubbing, cyanosis, or edema          INTERPRETATION OF TELEMETRY:    ECG:    I&O's Detail    11 May 2022 07:01  -  12 May 2022 07:00  --------------------------------------------------------  IN:    PRBCs (Packed Red Blood Cells): 297 mL  Total IN: 297 mL    OUT:    Other (mL): 500 mL    Voided (mL): 0 mL  Total OUT: 500 mL    Total NET: -203 mL          LABS:                        8.4    5.25  )-----------( 187      ( 12 May 2022 04:57 )             26.6     05-12    138  |  98  |  13.6  ----------------------------<  102<H>  3.9   |  27.0  |  3.31<H>    Ca    8.1<L>      12 May 2022 04:57  Phos  3.7     0512  Mg     2.1     12    TPro  5.6<L>  /  Alb  2.5<L>  /  TBili  0.3<L>  /  DBili  x   /  AST  13  /  ALT  6   /  AlkPhos  61  05-12    CARDIAC MARKERS ( 12 May 2022 14:41 )  x     / 0.61 ng/mL / x     / x     / x      CARDIAC MARKERS ( 12 May 2022 08:36 )  x     / 0.64 ng/mL / x     / x     / x          PT/INR - ( 12 May 2022 04:57 )   PT: 13.9 sec;   INR: 1.20 ratio         PTT - ( 12 May 2022 04:57 )  PTT:28.1 sec    BNP  I&O's Detail    11 May 2022 07:01  -  12 May 2022 07:00  --------------------------------------------------------  IN:    PRBCs (Packed Red Blood Cells): 297 mL  Total IN: 297 mL    OUT:    Other (mL): 500 mL    Voided (mL): 0 mL  Total OUT: 500 mL    Total NET: -203 mL        Daily     Daily Weight in k.7 (11 May 2022 17:30)    RADIOLOGY & ADDITIONAL STUDIES: Patient is a 62y old  Female who presents with a chief complaint of Hemoperitoneum (12 May 2022 16:20)        PAST MEDICAL & SURGICAL HISTORY:  DM (diabetes mellitus)      HTN (hypertension)      HLD (hyperlipidemia)      End stage renal disease on dialysis      Pericardial effusion      Pleural effusion      Chronic systolic congestive heart failure      Anemia due to chronic kidney disease, unspecified CKD stage      Paroxysmal atrial fibrillation      Coronary artery disease, angina presence unspecified, unspecified vessel or lesion type, unspecified whether native or transplanted heart      Diabetes      End stage renal disease      Encounter for dialysis catheter care      A-V fistula      A-V fistula          Summary of admission HPI:    61 yo F w/ h/o mild NICM (LVEF 40-45% by TTE in 2021; 2019: negative LHC), h/o PAF (2017 [facility]: brief inpatient episode likely secondary to medical procedure; MCOT 28-day in 2021: 0 episodes of AF, therefore A/C discontinued), ESRD, cirrhosis (~VELA; Q 2week paracentesis regimen), DM, HTN, HLD who presented to [facility] ED on 05/10 with abdominal pain/hemoperitoneum/peritoneal hematoma in the setting of IR large volume paracentesis that same day. Patient was triaged to MICU (downgraded to tele ) where, last night, patient reportedly had SVT documented on ECG that broke w/ IV adenosine.             CHEST CT PULMONARY ANGIO with IV Contrast:  FINDINGS:  MEDICATIONS  (STANDING):  atorvastatin 40 milliGRAM(s) Oral at bedtime  calcium acetate 667 milliGRAM(s) Oral three times a day with meals  cefTRIAXone   IVPB      cefTRIAXone   IVPB 2000 milliGRAM(s) IV Intermittent every 24 hours  dextrose 5%. 1000 milliLiter(s) (100 mL/Hr) IV Continuous <Continuous>  dextrose 5%. 1000 milliLiter(s) (50 mL/Hr) IV Continuous <Continuous>  dextrose 50% Injectable 25 Gram(s) IV Push once  dextrose 50% Injectable 12.5 Gram(s) IV Push once  dextrose 50% Injectable 25 Gram(s) IV Push once  epoetin gian-epbx (RETACRIT) Injectable 77226 Unit(s) IV Push <User Schedule>  glucagon  Injectable 1 milliGRAM(s) IntraMuscular once  hydrALAZINE 25 milliGRAM(s) Oral every 12 hours  insulin lispro (ADMELOG) corrective regimen sliding scale   SubCutaneous three times a day before meals  levothyroxine 50 MICROGram(s) Oral daily  metoprolol tartrate 50 milliGRAM(s) Oral two times a day  pantoprazole    Tablet 40 milliGRAM(s) Oral before breakfast    MEDICATIONS  (PRN):  dextrose Oral Gel 15 Gram(s) Oral once PRN Blood Glucose LESS THAN 70 milliGRAM(s)/deciliter  metoprolol tartrate Injectable 5 milliGRAM(s) IV Push every 6 hours PRN HR> 120s  ondansetron Injectable 4 milliGRAM(s) IV Push every 6 hours PRN Nausea and/or Vomiting      FAMILY HISTORY:  Family history of kidney disease in brother (Sibling)    REVIEW OF SYSTEMS:    CONSTITUTIONAL: No fever, weight loss, chills, shakes, or fatigue  EYES: No eye pain, visual disturbances, or discharge  ENMT:  No difficulty hearing, tinnitus, vertigo; No sinus or throat painn  PSYCHIATRIC: No depression, anxiety, or difficulty sleeping  HEME/LYMPH: No easy bruising or bleeding gums  ALLERY AND IMMUNOLOGIC: No hives or rash.      Vital Signs Last 24 Hrs  T(C): 36.9 (12 May 2022 16:26), Max: 37 (12 May 2022 11:47)  T(F): 98.5 (12 May 2022 16:26), Max: 98.6 (12 May 2022 11:47)  HR: 69 (12 May 2022 16:26) (62 - 140)  BP: 153/74 (12 May 2022 16:26) (97/74 - 153/74)  BP(mean): 99 (12 May 2022 05:00) (80 - 100)  RR: 18 (12 May 2022 16:26) (10 - 30)  SpO2: 96% (12 May 2022 16:26) (91% - 100%)    PHYSICAL EXAM:    GENERAL: In no apparent distress, well nourished, and hydrated.  HEART: Regular rate and rhythm; No murmurs, rubs, or gallops.  PULMONARY: Clear to auscultation and perfusion.  No rales, wheezing, or rhonchi bilaterally.  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)    I&O's Detail    11 May 2022 07:01  -  12 May 2022 07:00  --------------------------------------------------------  IN:    PRBCs (Packed Red Blood Cells): 297 mL  Total IN: 297 mL    OUT:    Other (mL): 500 mL    Voided (mL): 0 mL  Total OUT: 500 mL    Total NET: -203 mL          LABS:                        8.4    5.25  )-----------( 187      ( 12 May 2022 04:57 )             26.6     05-12    138  |  98  |  13.6  ----------------------------<  102<H>  3.9   |  27.0  |  3.31<H>    Ca    8.1<L>      12 May 2022 04:57  Phos  3.7       Mg     2.1         TPro  5.6<L>  /  Alb  2.5<L>  /  TBili  0.3<L>  /  DBili  x   /  AST  13  /  ALT  6   /  AlkPhos  61  05-12    CARDIAC MARKERS ( 12 May 2022 14:41 )  x     / 0.61 ng/mL / x     / x     / x      CARDIAC MARKERS ( 12 May 2022 08:36 )  x     / 0.64 ng/mL / x     / x     / x          PT/INR - ( 12 May 2022 04:57 )   PT: 13.9 sec;   INR: 1.20 ratio         PTT - ( 12 May 2022 04:57 )  PTT:28.1 sec    BNP  I&O's Detail    11 May 2022 07:01  -  12 May 2022 07:00  --------------------------------------------------------  IN:    PRBCs (Packed Red Blood Cells): 297 mL  Total IN: 297 mL    OUT:    Other (mL): 500 mL    Voided (mL): 0 mL  Total OUT: 500 mL    Total NET: -203 mL        Daily     Daily Weight in k.7 (11 May 2022 17:30)    RADIOLOGY & ADDITIONAL STUDIES:

## 2022-05-12 NOTE — PROGRESS NOTE ADULT - ASSESSMENT
61 y/o F with a h/o ESRD on HD (M/W/F), CAD, CHFrEF (LVEF 40-45%), cirrhosis (?VELA, recurrent ascites requiring intermittent paracentesis), pAF (no a/c secondary to prior GIB), DM2, anemia, HTN, HLD, hypothyroid, presents to the ED with complaints of abdominal pain with radiation to back and n/v. was found to have hemoperitoneum and peritoneal hematoma and admitted to MICU. as stable downgraded to medicine. after downgraded found to have svt was given adenosine and stabilized      #hemoperitoneum/ peritoneal hematoma  - patient with recent paracentesis  - monitor h and h   - surgery consult appreciated    #ESRD on HD  - HD per Nephro- nephro consult appreciated  - retacrit    #anemia  - probable chronic disease  - check iron studies b12 and folate  - retacrit  - monitor h and h     #liver cirrhosis  - may be 2/2 VELA  - monitor   - on ceftriaxone to cover for sbp  - ?recent paracentesis    #HTN-Essential  - monitor blood pressure  - metoprolol    #DVT Prophylaxis  - venodynes    attempted to call patient daughter in law emergency contact [phone] no answer   63 y/o F with a h/o ESRD on HD (M/W/F), CAD, CHFrEF (LVEF 40-45%), cirrhosis (?VELA, recurrent ascites requiring intermittent paracentesis), pAF (no a/c secondary to prior GIB), DM2, anemia, HTN, HLD, hypothyroid, presents to the ED with complaints of abdominal pain with radiation to back and n/v. was found to have hemoperitoneum and peritoneal hematoma and admitted to MICU. as stable downgraded to medicine. after downgraded found to have svt was given adenosine and stabilized      #hemoperitoneum/ peritoneal hematoma  - patient with recent paracentesis  - monitor h and h   - surgery consult appreciated    #ESRD on HD  - HD per Nephro- nephro consult appreciated  - retacrit    #anemia  - probable chronic disease  - check iron studies b12 and folate  - retacrit  - monitor h and h     #elevated troponin  - probable 2/2 demand svt and renal disease    #liver cirrhosis  - may be 2/2 VELA  - monitor   - on ceftriaxone to cover for sbp  - ?recent paracentesis    #HTN-Essential  - monitor blood pressure  - metoprolol    #DVT Prophylaxis  - venodynes    attempted to call patient daughter in law emergency contact [phone] no answer   63 y/o F with a h/o ESRD on HD (M/W/F), CAD, CHFrEF (LVEF 40-45%), cirrhosis (?VELA, recurrent ascites requiring intermittent paracentesis), pAF (no a/c secondary to prior GIB), DM2, anemia, HTN, HLD, hypothyroid, presents to the ED with complaints of abdominal pain with radiation to back and n/v. was found to have hemoperitoneum and peritoneal hematoma and admitted to MICU. as stable downgraded to medicine. after downgraded found to have svt was given adenosine and stabilized      #hemoperitoneum/ peritoneal hematoma  - patient with recent paracentesis  - monitor h and h   - surgery consult appreciated    #ESRD on HD  - HD per Nephro- nephro consult appreciated  - retacrit    #svt   - s/p adenosine overnight on 5/12 - now improved  - cardio consult appreciated    #anemia  - probable chronic disease  - check iron studies b12 and folate  - retacrit  - monitor h and h     #elevated troponin  - probable 2/2 demand svt and renal disease    #liver cirrhosis  - may be 2/2 VELA  - monitor   - on ceftriaxone to cover for sbp  - ?recent paracentesis    #HTN-Essential  - monitor blood pressure  - metoprolol    #DVT Prophylaxis  - venodynes    attempted to call patient daughter in law emergency contact [phone] no answer

## 2022-05-13 NOTE — PROGRESS NOTE ADULT - ASSESSMENT
61 yo F w/ h/o mild NICM (LVEF 40-45% by TTE in 02/2021; 2019: negative LHC), h/o PAF (2017 [facility]: brief inpatient episode likely secondary to medical procedure; MCOT 28-day in 03/2021: 0 episodes of AF, therefore A/C discontinued), ESRD, cirrhosis (~VELA; Q 2week paracentesis regimen), DM, HTN, HLD who presented to [facility] ED on 05/10 with abdominal pain/hemoperitoneum/peritoneal hematoma in the setting of IR large volume paracentesis that same day. Patient was triaged to MICU (downgraded to tele 05/11) where, last night, patient reportedly had SVT documented on ECG that broke w/ IV adenosine.     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 PAF  3. NICM: TTE 05/12 similar to past  4. ESRD  5. 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 Agree with chronic suppressive therapy with PO metoprolol and will follow as outpatient to sumit the need/desire for definitive therapy with catheter ablation   2. Employ IV adenosine/IV metoprolol/IV diltiazem as needed/appropriate/efficacious for recurrence   3. Monitor on telemetry

## 2022-05-13 NOTE — DIETITIAN INITIAL EVALUATION ADULT - OTHER INFO
63 y/o F with a h/o ESRD on HD (M/W/F), CAD, CHFrEF (LVEF 40-45%), cirrhosis (?VELA, recurrent ascites requiring intermittent paracentesis), pAF (no a/c secondary to prior GIB), DM2, anemia, HTN, HLD, hypothyroid, presents to the ED with complaints of abdominal pain with radiation to back and n/v. was found to have hemoperitoneum and peritoneal hematoma and admitted to MICU. as stable downgraded to medicine. after downgraded found to have svt was given adenosine and stabilized.

## 2022-05-13 NOTE — PROGRESS NOTE ADULT - SUBJECTIVE AND OBJECTIVE BOX
INTERVAL HPI/OVERNIGHT EVENTS:    Patient evaluated at bedside. No acute distress. No acute events overnight.    MEDICATIONS  (STANDING):  atorvastatin 40 milliGRAM(s) Oral at bedtime  calcium acetate 667 milliGRAM(s) Oral three times a day with meals  cefTRIAXone   IVPB      cefTRIAXone   IVPB 2000 milliGRAM(s) IV Intermittent every 24 hours  dextrose 5%. 1000 milliLiter(s) (100 mL/Hr) IV Continuous <Continuous>  dextrose 5%. 1000 milliLiter(s) (50 mL/Hr) IV Continuous <Continuous>  dextrose 50% Injectable 25 Gram(s) IV Push once  dextrose 50% Injectable 12.5 Gram(s) IV Push once  dextrose 50% Injectable 25 Gram(s) IV Push once  epoetin gian-epbx (RETACRIT) Injectable 38717 Unit(s) IV Push <User Schedule>  glucagon  Injectable 1 milliGRAM(s) IntraMuscular once  hydrALAZINE 25 milliGRAM(s) Oral every 12 hours  insulin lispro (ADMELOG) corrective regimen sliding scale   SubCutaneous three times a day before meals  levothyroxine 50 MICROGram(s) Oral daily  metoprolol tartrate 50 milliGRAM(s) Oral two times a day  pantoprazole    Tablet 40 milliGRAM(s) Oral before breakfast    MEDICATIONS  (PRN):  dextrose Oral Gel 15 Gram(s) Oral once PRN Blood Glucose LESS THAN 70 milliGRAM(s)/deciliter  metoprolol tartrate Injectable 5 milliGRAM(s) IV Push every 6 hours PRN HR> 120s  ondansetron Injectable 4 milliGRAM(s) IV Push every 6 hours PRN Nausea and/or Vomiting      Vital Signs Last 24 Hrs  T(C): 37.1 (12 May 2022 19:56), Max: 37.1 (12 May 2022 19:56)  T(F): 98.7 (12 May 2022 19:56), Max: 98.7 (12 May 2022 19:56)  HR: 68 (12 May 2022 19:56) (62 - 70)  BP: 132/68 (12 May 2022 19:56) (125/63 - 153/74)  BP(mean): 99 (12 May 2022 05:00) (80 - 99)  RR: 18 (12 May 2022 19:56) (10 - 21)  SpO2: 100% (12 May 2022 19:56) (96% - 100%)    Physical Exam:  Gen: NAD  Neurological:  No sensory/motor deficits  HEENT: PERRLA, EOMI  Respiratory: Breath Sounds equal & CTA bilaterally, no accessory muscle use  Cardiovascular: Regular rate & rhythm, normal S1, S2; no murmurs, gallops or rubs  Gastrointestinal: Soft, non-tender, nondistended  Musculoskeletal: No joint pain, swelling or deformity; no limitation of movement  Skin: No rashes      I&O's Detail    11 May 2022 07:01  -  12 May 2022 07:00  --------------------------------------------------------  IN:    PRBCs (Packed Red Blood Cells): 297 mL  Total IN: 297 mL    OUT:    Other (mL): 500 mL    Voided (mL): 0 mL  Total OUT: 500 mL    Total NET: -203 mL          LABS:                        8.4    5.25  )-----------( 187      ( 12 May 2022 04:57 )             26.6     05-12    138  |  98  |  13.6  ----------------------------<  102<H>  3.9   |  27.0  |  3.31<H>    Ca    8.1<L>      12 May 2022 04:57  Phos  3.7     05-12  Mg     2.1     05-12    TPro  5.6<L>  /  Alb  2.5<L>  /  TBili  0.3<L>  /  DBili  x   /  AST  13  /  ALT  6   /  AlkPhos  61  05-12    PT/INR - ( 12 May 2022 04:57 )   PT: 13.9 sec;   INR: 1.20 ratio         PTT - ( 12 May 2022 04:57 )  PTT:28.1 sec      RADIOLOGY & ADDITIONAL STUDIES:

## 2022-05-13 NOTE — DISCHARGE NOTE NURSING/CASE MANAGEMENT/SOCIAL WORK - PATIENT PORTAL LINK FT
You can access the FollowMyHealth Patient Portal offered by [facility] by registering at the following website: [email or web address]. By joining [employer]’s FollowMyHealth portal, you will also be able to view your health information using other applications (apps) compatible with our system.

## 2022-05-13 NOTE — DIETITIAN NUTRITION RISK NOTIFICATION - TREATMENT: THE FOLLOWING DIET HAS BEEN RECOMMENDED
Olmesartan-HCTZ is on backorder is there an alternative that can be prescribed in place of this? Please advise and send.   Diet, Consistent Carbohydrate Renal w/Evening Snack (05-11-22 @ 23:55) [Active]

## 2022-05-13 NOTE — DISCHARGE NOTE PROVIDER - ATTENDING DISCHARGE PHYSICAL EXAMINATION:
PHYSICAL EXAM:  General: NAD, A/O x 3  ENT: MMM, no thrush  Neck: Supple, No JVD  Lungs: Clear to auscultation bilaterally, good air entry, non-labored breathing  Cardio: +s1/s2  Abdomen: Soft, nondistended; Bowel sounds present  Extremities: No calf tenderness

## 2022-05-13 NOTE — PROGRESS NOTE ADULT - ASSESSMENT
61 y/o F with a h/o ESRD on HD (M/W/F), CAD, CHFrEF (LVEF 40-45%), cirrhosis (?VELA, recurrent ascites requiring intermittent paracentesis), pAF (no a/c secondary to prior GIB), DM2, anemia, HTN, HLD, hypothyroid, presents to the ED with complaints of abdominal pain with radiation to back and n/v. was found to have hemoperitoneum and peritoneal hematoma and admitted to MICU. as stable downgraded to medicine. after downgraded found to have svt was given adenosine and stabilized      #hemoperitoneum/ peritoneal hematoma  - patient with recent paracentesis  - monitor h and h   - surgery consult appreciated    #ESRD on HD  - HD per Nephro- nephro consult appreciated  - retacrit    #svt   - s/p adenosine overnight on 5/12 - now improved  - cardio consult appreciated    #anemia  - probable chronic disease  - check iron studies b12 and folate  - retacrit  - monitor h and h     #elevated troponin  - probable 2/2 demand svt and renal disease    #liver cirrhosis  - may be 2/2 VELA  - monitor   - on ceftriaxone to cover for sbp  - ?recent paracentesis    #HTN-Essential  - monitor blood pressure  - metoprolol    #DVT Prophylaxis  - venodynes    spoke to patient daughter in law Bhakti [phone]. all questions answered.    d/w surgery team ok to restart aspirin and ok to d/c from surgical perspective. will d/c patient home in stable condition as h/h stable.

## 2022-05-13 NOTE — DISCHARGE NOTE PROVIDER - PROVIDER TOKENS
PROVIDER:[TOKEN:[[id number]]],PROVIDER:[TOKEN:[[id number]]],PROVIDER:[TOKEN:[[id number]]],FREE:[LAST:[Primary Care Doctor],PHONE:[(   )    -],FAX:[(   )    -]]

## 2022-05-13 NOTE — DISCHARGE NOTE PROVIDER - HOSPITAL COURSE
61 y/o F with a h/o ESRD on HD (M/W/F), CAD, CHFrEF (LVEF 40-45%), cirrhosis (?VELA, recurrent ascites requiring intermittent paracentesis), pAF (no a/c secondary to prior GIB), DM2, anemia, HTN, HLD, hypothyroid, presents to the ED with complaints of abdominal pain with radiation to back and n/v. Found to have peritoneal hematoma and hemorrhagic ascites was eval by surgery now improved. patient also with svt while admitted however improved was seen by cardiology while admitted. was also seen by nephro as on HD. now being discharged in stable condition.

## 2022-05-13 NOTE — DIETITIAN INITIAL EVALUATION ADULT - PERTINENT MEDS FT
MEDICATIONS  (STANDING):  atorvastatin 40 milliGRAM(s) Oral at bedtime  calcium acetate 667 milliGRAM(s) Oral three times a day with meals  cefTRIAXone   IVPB      cefTRIAXone   IVPB 2000 milliGRAM(s) IV Intermittent every 24 hours  dextrose 5%. 1000 milliLiter(s) (100 mL/Hr) IV Continuous <Continuous>  dextrose 5%. 1000 milliLiter(s) (50 mL/Hr) IV Continuous <Continuous>  dextrose 50% Injectable 25 Gram(s) IV Push once  dextrose 50% Injectable 12.5 Gram(s) IV Push once  dextrose 50% Injectable 25 Gram(s) IV Push once  epoetin gian-epbx (RETACRIT) Injectable 66180 Unit(s) IV Push <User Schedule>  glucagon  Injectable 1 milliGRAM(s) IntraMuscular once  hydrALAZINE 25 milliGRAM(s) Oral every 12 hours  insulin lispro (ADMELOG) corrective regimen sliding scale   SubCutaneous three times a day before meals  levothyroxine 50 MICROGram(s) Oral daily  metoprolol tartrate 50 milliGRAM(s) Oral two times a day  pantoprazole    Tablet 40 milliGRAM(s) Oral before breakfast    MEDICATIONS  (PRN):  dextrose Oral Gel 15 Gram(s) Oral once PRN Blood Glucose LESS THAN 70 milliGRAM(s)/deciliter  metoprolol tartrate Injectable 5 milliGRAM(s) IV Push every 6 hours PRN HR> 120s  ondansetron Injectable 4 milliGRAM(s) IV Push every 6 hours PRN Nausea and/or Vomiting

## 2022-05-13 NOTE — DIETITIAN INITIAL EVALUATION ADULT - ADD RECOMMEND
RX:  1) Prostat BID (200kcal, 30gpro) to meet increased kcal/pro needs  2) Encourage HBV protein foods at meals  3) Monitor need for additional supplements or snacks  4) Monitor renal related labs; Monitor iron, trasferritin, TIBC

## 2022-05-13 NOTE — DIETITIAN INITIAL EVALUATION ADULT - PERTINENT LABORATORY DATA
05-13    129<L>  |  90<L>  |  26.9<H>  ----------------------------<  231<H>  5.8<H>   |  24.0  |  5.16<H>    Ca    8.3<L>      13 May 2022 06:36  Phos  5.2     05-13  Mg     2.2     05-13    TPro  x   /  Alb  2.8<L>  /  TBili  x   /  DBili  x   /  AST  x   /  ALT  x   /  AlkPhos  x   05-13  POCT Blood Glucose.: 110 mg/dL (05-13-22 @ 11:28)  A1C with Estimated Average Glucose Result: 8.1 % (05-12-22 @ 04:57)

## 2022-05-13 NOTE — DIETITIAN INITIAL EVALUATION ADULT - FACTORS AFF FOOD INTAKE
Anesthesia Evaluation     Patient summary reviewed and Nursing notes reviewed   no history of anesthetic complications:  NPO Solid Status: > 8 hours  NPO Liquid Status: > 8 hours           Airway   Mallampati: I  TM distance: >3 FB  Neck ROM: full  no difficulty expected  Dental - normal exam   (+) upper dentures and lower dentures    Pulmonary - normal exam   (+) a smoker Former,   Cardiovascular - normal exam    Patient on routine beta blocker and Beta blocker given within 24 hours of surgery  Rhythm: regular  Rate: normal    (+) hypertension,       Neuro/Psych  (+) headaches, dizziness/light headedness,     GI/Hepatic/Renal/Endo    (+)  GERD,  liver disease fatty liver disease,     Musculoskeletal     Abdominal  - normal exam    Abdomen: soft.  Bowel sounds: normal.   Substance History      OB/GYN          Other                        Anesthesia Plan    ASA 2     MAC   (Risks and benefits discussed including risk of aspiration, recall and dental damage. All patient questions answered. Will continue with POC.)  intravenous induction     Anesthetic plan, all risks, benefits, and alternatives have been provided, discussed and informed consent has been obtained with: patient.      
other (specify)

## 2022-05-13 NOTE — PROGRESS NOTE ADULT - SUBJECTIVE AND OBJECTIVE BOX
NEPHROLOGY INTERVAL HPI/OVERNIGHT EVENTS:    Examined  No distress  HD today    MEDICATIONS  (STANDING):  atorvastatin 40 milliGRAM(s) Oral at bedtime  calcium acetate 667 milliGRAM(s) Oral three times a day with meals  cefTRIAXone   IVPB      cefTRIAXone   IVPB 2000 milliGRAM(s) IV Intermittent every 24 hours  dextrose 5%. 1000 milliLiter(s) (100 mL/Hr) IV Continuous <Continuous>  dextrose 5%. 1000 milliLiter(s) (50 mL/Hr) IV Continuous <Continuous>  dextrose 50% Injectable 25 Gram(s) IV Push once  dextrose 50% Injectable 12.5 Gram(s) IV Push once  dextrose 50% Injectable 25 Gram(s) IV Push once  epoetin gian-epbx (RETACRIT) Injectable 55580 Unit(s) IV Push <User Schedule>  glucagon  Injectable 1 milliGRAM(s) IntraMuscular once  hydrALAZINE 25 milliGRAM(s) Oral every 12 hours  insulin lispro (ADMELOG) corrective regimen sliding scale   SubCutaneous three times a day before meals  levothyroxine 50 MICROGram(s) Oral daily  metoprolol tartrate 50 milliGRAM(s) Oral two times a day  pantoprazole    Tablet 40 milliGRAM(s) Oral before breakfast    MEDICATIONS  (PRN):  dextrose Oral Gel 15 Gram(s) Oral once PRN Blood Glucose LESS THAN 70 milliGRAM(s)/deciliter  metoprolol tartrate Injectable 5 milliGRAM(s) IV Push every 6 hours PRN HR> 120s  ondansetron Injectable 4 milliGRAM(s) IV Push every 6 hours PRN Nausea and/or Vomiting      Allergies    eggs (Anaphylaxis)  No Known Drug Allergies    Intolerances        Vital Signs Last 24 Hrs  T(C): 37.1 (13 May 2022 11:48), Max: 37.1 (12 May 2022 19:56)  T(F): 98.8 (13 May 2022 11:48), Max: 98.8 (13 May 2022 11:48)  HR: 59 (13 May 2022 11:48) (59 - 69)  BP: 156/71 (13 May 2022 11:48) (132/68 - 164/76)  BP(mean): --  RR: 18 (13 May 2022 11:48) (18 - 18)  SpO2: 100% (13 May 2022 11:48) (92% - 100%)  Daily     Daily Weight in k.8 (13 May 2022 08:19)    PHYSICAL EXAM:    GENERAL: NAD, well-groomed, well-developed  HEAD:  Atraumatic, Normocephalic  EYES: EOMI, PERRLA, conjunctiva and sclera clear  ENMT: No tonsillar erythema, exudates, or enlargement; Moist mucous membranes, Good dentition, No lesions  NECK: Supple, No JVD, Normal thyroid  NERVOUS SYSTEM:  Alert & Oriented X3, Good concentration; Motor Strength 5/5 B/L upper and lower extremities; DTRs 2+ intact and symmetric  CHEST/LUNG: Clear to percussion bilaterally; No rales, rhonchi, wheezing, or rubs  HEART: Regular rate and rhythm; No murmurs, rubs, or gallops  ABDOMEN: Soft, Nontender, Nondistended; Bowel sounds present  EXTREMITIES:  2+ Peripheral Pulses, No clubbing, cyanosis, or edema  SKIN: No rashes or lesions    LABS:                        8.6    4.18  )-----------( 182      ( 13 May 2022 06:36 )             28.1         129<L>  |  90<L>  |  26.9<H>  ----------------------------<  231<H>  5.8<H>   |  24.0  |  5.16<H>    Ca    8.3<L>      13 May 2022 06:36  Phos  5.2       Mg     2.2         TPro  x   /  Alb  2.8<L>  /  TBili  x   /  DBili  x   /  AST  x   /  ALT  x   /  AlkPhos  x       PT/INR - ( 12 May 2022 04:57 )   PT: 13.9 sec;   INR: 1.20 ratio         PTT - ( 12 May 2022 04:57 )  PTT:28.1 sec    Intact PTH: 92 pg/mL ( @ 12:12)  Magnesium, Serum: 2.2 mg/dL ( @ 06:36)  Phosphorus Level, Serum: 5.2 mg/dL ( @ 06:36)          RADIOLOGY & ADDITIONAL TESTS:

## 2022-05-13 NOTE — DISCHARGE NOTE PROVIDER - CARE PROVIDER_API CALL
Migdalia Ovalles)  Surgery; Surgical Critical Care  [address], [place] [id number]  Phone: [phone]  Fax: [phone]  Follow Up Time:     Kemar Kidd  Cardiovascular Diseases  [address] [place] [address]  Phone: [phone]  Fax: [phone]  Follow Up Time:     Srini Gay)  Internal Medicine; Nephrology  [address] [facility] [address]  [place], [place] [id number]  Phone: [phone]  Fax: [phone]  Follow Up Time:     Primary Care Doctor,   Phone: (   )    -  Fax: (   )    -  Follow Up Time:

## 2022-05-13 NOTE — PROGRESS NOTE ADULT - ASSESSMENT
A) S/P retroperitoneal bleed right side, Anemia, DM 2 with CRF 5.    P) ECHO; HD today with epo.; labs.

## 2022-05-13 NOTE — DISCHARGE NOTE PROVIDER - NSDCCPCAREPLAN_GEN_ALL_CORE_FT
PRINCIPAL DISCHARGE DIAGNOSIS  Diagnosis: Hemorrhagic ascites  Assessment and Plan of Treatment: - improved  - take medications as rx  - follow up with pcp, and surgery      SECONDARY DISCHARGE DIAGNOSES  Diagnosis: SVT (supraventricular tachycardia)  Assessment and Plan of Treatment: - follow up with cardiology  - take medications as rx

## 2022-05-13 NOTE — PROGRESS NOTE ADULT - SUBJECTIVE AND OBJECTIVE BOX
Patient is a 62y old  Female who presents with a chief complaint of Hemoperitoneum (13 May 2022 16:22)        PAST MEDICAL & SURGICAL HISTORY:  DM (diabetes mellitus)      HTN (hypertension)      HLD (hyperlipidemia)      End stage renal disease on dialysis      Pericardial effusion      Pleural effusion      Chronic systolic congestive heart failure      Anemia due to chronic kidney disease, unspecified CKD stage      Paroxysmal atrial fibrillation      Coronary artery disease, angina presence unspecified, unspecified vessel or lesion type, unspecified whether native or transplanted heart      Diabetes      End stage renal disease      Encounter for dialysis catheter care      A-V fistula      A-V fistula        CHEST CT PULMONARY ANGIO with IV Contrast:  FINDINGS:  MEDICATIONS  (STANDING):  atorvastatin 40 milliGRAM(s) Oral at bedtime  calcium acetate 667 milliGRAM(s) Oral three times a day with meals  cefTRIAXone   IVPB      cefTRIAXone   IVPB 2000 milliGRAM(s) IV Intermittent every 24 hours  dextrose 5%. 1000 milliLiter(s) (100 mL/Hr) IV Continuous <Continuous>  dextrose 5%. 1000 milliLiter(s) (50 mL/Hr) IV Continuous <Continuous>  dextrose 50% Injectable 25 Gram(s) IV Push once  dextrose 50% Injectable 12.5 Gram(s) IV Push once  dextrose 50% Injectable 25 Gram(s) IV Push once  epoetin gian-epbx (RETACRIT) Injectable 45325 Unit(s) IV Push <User Schedule>  glucagon  Injectable 1 milliGRAM(s) IntraMuscular once  hydrALAZINE 25 milliGRAM(s) Oral every 12 hours  insulin lispro (ADMELOG) corrective regimen sliding scale   SubCutaneous three times a day before meals  levothyroxine 50 MICROGram(s) Oral daily  metoprolol tartrate 50 milliGRAM(s) Oral two times a day  pantoprazole    Tablet 40 milliGRAM(s) Oral before breakfast    MEDICATIONS  (PRN):  dextrose Oral Gel 15 Gram(s) Oral once PRN Blood Glucose LESS THAN 70 milliGRAM(s)/deciliter  metoprolol tartrate Injectable 5 milliGRAM(s) IV Push every 6 hours PRN HR> 120s  ondansetron Injectable 4 milliGRAM(s) IV Push eVital Signs Last 24 Hrs  T(C): 37 (13 May 2022 16:21), Max: 37.1 (12 May 2022 19:56)  T(F): 98.6 (13 May 2022 16:21), Max: 98.8 (13 May 2022 11:48)  HR: 65 (13 May 2022 16:21) (59 - 68)  BP: 153/67 (13 May 2022 16:21) (132/68 - 164/76)  BP(mean): --  RR: 18 (13 May 2022 16:21) (18 - 18)  SpO2: 100% (13 May 2022 16:21) (92% - 100%)    PHYSICAL EXAM:    GENERAL: In no apparent distress, well nourished, and hydrated.  HEART: Regular rate and rhythm; No murmurs, rubs, or gallops.  PULMONARY: Clear to auscultation and perfusion.  No rales, wheezing, or rhonchi bilaterally.  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)      I&O's Detail    13 May 2022 07:01  -  13 May 2022 17:47  --------------------------------------------------------  IN:  Total IN: 0 mL    OUT:    Other (mL): 1000 mL  Total OUT: 1000 mL    Total NET: -1000 mL          LABS:                        8.6    4.18  )-----------( 182      ( 13 May 2022 06:36 )             28.1     05-13    129<L>  |  90<L>  |  26.9<H>  ----------------------------<  231<H>  5.8<H>   |  24.0  |  5.16<H>    Ca    8.3<L>      13 May 2022 06:36  Phos  5.2     05-13  Mg     2.2     05-13    TPro  x   /  Alb  2.8<L>  /  TBili  x   /  DBili  x   /  AST  x   /  ALT  x   /  AlkPhos  x   05-13    CARDIAC MARKERS ( 13 May 2022 06:36 )  x     / 0.57 ng/mL / x     / x     / x      CARDIAC MARKERS ( 12 May 2022 21:41 )  x     / 0.62 ng/mL / x     / x     / x      CARDIAC MARKERS ( 12 May 2022 14:41 )  x     / 0.61 ng/mL / x     / x     / x      CARDIAC MARKERS ( 12 May 2022 08:36 )  x     / 0.64 ng/mL / x     / x     / x          PT/INR - ( 12 May 2022 04:57 )   PT: 13.9 sec;   INR: 1.20 ratio         PTT - ( 12 May 2022 04:57 )  PTT:28.1 sec    BNP  I&O's Detail    13 May 2022 07:01  -  13 May 2022 17:47  --------------------------------------------------------  IN:  Total IN: 0 mL    OUT:    Other (mL): 1000 mL  Total OUT: 1000 mL    Total NET: -1000 mL        Daily     Daily Weight in k.8 (13 May 2022 08:19)    RADIOLOGY & ADDITIONAL STUDIES:

## 2022-05-13 NOTE — PROGRESS NOTE ADULT - REASON FOR ADMISSION
Hemoperitoneum

## 2022-05-13 NOTE — DISCHARGE NOTE PROVIDER - NSDCMRMEDTOKEN_GEN_ALL_CORE_FT
alendronate 70 mg oral tablet: 1 tab(s) orally once a week  amLODIPine 10 mg oral tablet: 1 tab(s) orally once a day  aspirin 81 mg oral delayed release tablet: 1 tab(s) orally once a day  atorvastatin 40 mg oral tablet: 1 tab(s) orally once a day  calcium acetate 667 mg oral tablet: 1 tab(s) orally 3 times a day   hydrALAZINE 25 mg oral tablet: 1 tab(s) orally 3 times a day  levothyroxine 50 mcg (0.05 mg) oral tablet: 1 tab(s) orally once a day  metoprolol succinate 50 mg oral tablet, extended release: 1 tab(s) orally once a day  Protonix 20 mg oral delayed release tablet: 1 tab(s) orally once a day

## 2022-05-13 NOTE — PROGRESS NOTE ADULT - SUBJECTIVE AND OBJECTIVE BOX
Patient is a 62y old  Female who presents with a chief complaint of Hemoperitoneum (13 May 2022 14:48)    Patient seen and examined at bedside.     ALLERGIES:  eggs (Anaphylaxis)  No Known Drug Allergies    MEDICATIONS  (STANDING):  atorvastatin 40 milliGRAM(s) Oral at bedtime  calcium acetate 667 milliGRAM(s) Oral three times a day with meals  cefTRIAXone   IVPB      cefTRIAXone   IVPB 2000 milliGRAM(s) IV Intermittent every 24 hours  dextrose 5%. 1000 milliLiter(s) (100 mL/Hr) IV Continuous <Continuous>  dextrose 5%. 1000 milliLiter(s) (50 mL/Hr) IV Continuous <Continuous>  dextrose 50% Injectable 25 Gram(s) IV Push once  dextrose 50% Injectable 12.5 Gram(s) IV Push once  dextrose 50% Injectable 25 Gram(s) IV Push once  epoetin gian-epbx (RETACRIT) Injectable 02943 Unit(s) IV Push <User Schedule>  glucagon  Injectable 1 milliGRAM(s) IntraMuscular once  hydrALAZINE 25 milliGRAM(s) Oral every 12 hours  insulin lispro (ADMELOG) corrective regimen sliding scale   SubCutaneous three times a day before meals  levothyroxine 50 MICROGram(s) Oral daily  metoprolol tartrate 50 milliGRAM(s) Oral two times a day  pantoprazole    Tablet 40 milliGRAM(s) Oral before breakfast    MEDICATIONS  (PRN):  dextrose Oral Gel 15 Gram(s) Oral once PRN Blood Glucose LESS THAN 70 milliGRAM(s)/deciliter  metoprolol tartrate Injectable 5 milliGRAM(s) IV Push every 6 hours PRN HR> 120s  ondansetron Injectable 4 milliGRAM(s) IV Push every 6 hours PRN Nausea and/or Vomiting    Vital Signs Last 24 Hrs  T(F): 98.8 (13 May 2022 11:48), Max: 98.8 (13 May 2022 11:48)  HR: 59 (13 May 2022 11:48) (59 - 69)  BP: 156/71 (13 May 2022 11:48) (132/68 - 164/76)  RR: 18 (13 May 2022 11:48) (18 - 18)  SpO2: 100% (13 May 2022 11:48) (92% - 100%)  I&O's Summary    13 May 2022 07:01  -  13 May 2022 14:59  --------------------------------------------------------  IN: 0 mL / OUT: 1000 mL / NET: -1000 mL      PHYSICAL EXAM:  [id number]  General: NAD, A/O x 3 (to name location and state)  ENT: MMM, no thrush  Neck: Supple, No JVD  Lungs: Clear to auscultation bilaterally, good air entry, non-labored breathing  Cardio: +s1/s2  Abdomen: Soft, +distended; Bowel sounds present  Extremities: No calf tenderness      LABS:                        8.6    4.18  )-----------( 182      ( 13 May 2022 06:36 )             28.1     05-13    129  |  90  |  26.9  ----------------------------<  231  5.8   |  24.0  |  5.16    Ca    8.3      13 May 2022 06:36  Phos  5.2     05-13  Mg     2.2     05-13    TPro  x   /  Alb  2.8  /  TBili  x   /  DBili  x   /  AST  x   /  ALT  x   /  AlkPhos  x   05-13    Amylase, Serum Total: 40 U/L (05-11-22 @ 12:44)    Lipase, Serum: 6 U/L (05-11-22 @ 12:44)  Lipase, Serum: 18 U/L (05-10-22 @ 19:50)    PT/INR - ( 12 May 2022 04:57 )   PT: 13.9 sec;   INR: 1.20 ratio    PTT - ( 12 May 2022 04:57 )  PTT:28.1 sec    Lactate, Blood: 1.0 mmol/L (05-11 @ 06:51)    19:51 - VBG - pH: 7.400 | pCO2: 54    | pO2: <42   | Lactate: 3.90     Glucose  POCT Blood Glucose.: 110 mg/dL (13 May 2022 11:28)  POCT Blood Glucose.: 236 mg/dL (13 May 2022 07:33)  POCT Blood Glucose.: 207 mg/dL (12 May 2022 21:30)  POCT Blood Glucose.: 179 mg/dL (12 May 2022 17:53)    Cultures  Culture - Blood (collected 11 May 2022 08:35)  Source: .Blood Blood  Preliminary Report (13 May 2022 09:00):    No growth at 48 hours    Culture - Blood (collected 11 May 2022 06:52)  Source: .Blood Blood  Preliminary Report (13 May 2022 07:00):    No growth at 48 hours    RADIOLOGY & ADDITIONAL TESTS:  - no new tests    Care Discussed with Consultants/Other Providers:   Surgery

## 2022-05-13 NOTE — DISCHARGE NOTE PROVIDER - CARE PROVIDERS DIRECT ADDRESSES
,DirectAddress_Unknown,[email or web address],DirectAddress_Unknown,DirectAddress_Unknown

## 2022-05-13 NOTE — DIETITIAN INITIAL EVALUATION ADULT - ETIOLOGY
related to inability to meet estimated nutrition needs in the setting of ESRD on HD, ascites, anemia, DM, CAD, HTN

## 2022-05-13 NOTE — DISCHARGE NOTE NURSING/CASE MANAGEMENT/SOCIAL WORK - NSDCPEFALRISK_GEN_ALL_CORE
For information on Fall & Injury Prevention, visit: [email or web address] OR  [email or web address] OR  https://www.cdc.gov/steadi/patient.html

## 2022-05-13 NOTE — DIETITIAN INITIAL EVALUATION ADULT - ORAL INTAKE PTA/DIET HISTORY
Patient interview with ID#[id number]. Reports her intake is "OK" at meals since admission. Poor appetite. Reports emesis has stopped; some nausea at times. Patient states that she lives with her daughter who cooks and takes care of her. Eats more at home because she prefers food that her daughter prepares. Provided language appropriate written and verbal education on nutrition recommendations for ESRD on HD. Emphasizes HBV protein rich food sources. Lab values indicate declined renal function and food and nutrition knowledge deficit. Emphasized section of handout which expresses phosphorus and potassium content in foods. Physical assessment completed, see below. RD to followup prn.

## 2022-05-18 NOTE — ED STATDOCS - DR. NAME
Patient: Jose M CARBAJAL    Procedure Summary     Date: 05/18/22 Room / Location: [facility] ROOM 28 / SURGERY SAME DAY [facility]    Anesthesia Start: 0820 Anesthesia Stop: 1141    Procedure: RECONSTUCTION OF JAW JOINT, JAW ARTHROPLASTY, RIGHT (Right Face) Diagnosis: (ADHESIONS AND ANKYLOSIS OF TEMPAROMANDIBULAR JOINT)    Surgeons: Juan Galvan D.D.S. Responsible Provider: Paula Velazquez M.D.    Anesthesia Type: general ASA Status: 2          Final Anesthesia Type: general  Last vitals  BP   Blood Pressure: 83/47    Temp   36.1 °C (97 °F)    Pulse   73   Resp   20    SpO2   95 %      Anesthesia Post Evaluation    Patient location during evaluation: PACU  Patient participation: complete - patient participated  Level of consciousness: awake and alert    Airway patency: patent  Anesthetic complications: no  Cardiovascular status: hemodynamically stable  Respiratory status: acceptable  Hydration status: euvolemic    PONV: none          No complications documented.     Nurse Pain Score: 0  (Gibson-Baker Scale)         Victor Hugo

## 2022-05-21 NOTE — ED ADULT NURSE REASSESSMENT NOTE - NS ED NURSE REASSESS COMMENT FT1
Patient received from prior nurse. Patient on cardiac monitor. Atbx completed. Well appearing and resting. Safety maintained. Rails up x 2. Will CTM

## 2022-05-21 NOTE — ED ADULT TRIAGE NOTE - CHIEF COMPLAINT QUOTE
pt sent in from her doctors office tachycardia, chest pain since yesterday and generalized weakness.  MD called to triage.

## 2022-05-21 NOTE — ED PROVIDER NOTE - ATTENDING CONTRIBUTION TO CARE
I, Skinny Garcia, personally saw the patient with the resident, and completed the key components of the history and physical exam. I then discussed the management plan with the resident.    Upon my evaluation, this patient had a high probability of imminent or life-threatening deterioration due to tachycardia and sepsis  , which required my direct attention, intervention, and personal management.    63 yo F hx of ESRD on HD (M/W/F), CAD, CHFrEF (LVEF 40-45%), cirrhosis (?VELA, recurrent ascites requiring intermittent paracentesis complicated by hemoperitonium), pAF (no a/c secondary to prior GIB), DM2, anemia, HTN, HLD, hypothyroid, AVNRT p/w tachycardia, chest pain, and abdominal pain. patient found febril 100.7, given tylenol, vanc, and zosyn. cxr and CT chest showed infilrate, CT abdomen showed large ascieties and decreased hemoperitoneal. EKG with intermittent tachcyardia. patient admitted for further treatment.     I have personally provided  40  minutes of critical care time exclusive of time spent on separately billable procedures.  Time includes review of laboratory data, radiology results, discussion with consultants, and monitoring for potential decompensation.  Interventions were performed as documented.

## 2022-05-21 NOTE — ED PROVIDER NOTE - PHYSICAL EXAMINATION
VITAL SIGNS: I have reviewed nursing notes and confirm.  CONSTITUTIONAL:  in no acute distress.  SKIN: Skin exam is warm and dry, no acute rash.  HEAD: Normocephalic; atraumatic.  EYES: PERRL, EOM intact; conjunctiva and sclera clear.  ENT: No nasal discharge; airway clear. Throat clear.  NECK: Supple; non tender.    CARD: Regular rate and rhythm.  RESP: No wheezes,  (+) crackle at the base, R>L   ABD:  soft; (+) distended, mild epigastic tenderness    EXT: Normal ROM. No clubbing, cyanosis or edema. (+) left av fistula   NEURO: Alert, oriented. Grossly unremarkable. No focal deficits.  moves all extremities,  normal gait   PSYCH: Cooperative, appropriate.

## 2022-05-21 NOTE — ED PROVIDER NOTE - OBJECTIVE STATEMENT
61 y/o F with a h/o ESRD on HD (M/W/F), CAD, CHFrEF (LVEF 40-45%), cirrhosis (?VELA, recurrent ascites requiring intermittent paracentesis), pAF (no a/c secondary to prior GIB), DM2, anemia, HTN, HLD, hypothyroid who presents to ED with tachycardia. Since yesterday the patient has had chest pain and weakness. She also has left sided abdominal pain. Unclear hx. Per chart review - pt discharged 5/13 after admission for hemoperitoneum and peritoneal hematoma. 61 y/o F with a h/o ESRD on HD (M/W/F), CAD, CHFrEF (LVEF 40-45%), cirrhosis (?VELA, recurrent ascites requiring intermittent paracentesis), pAF (no a/c secondary to prior GIB), DM2, anemia, HTN, HLD, hypothyroid who presents to ED with tachycardia. Since yesterday the patient has had chest pain and weakness. She also has left sided abdominal pain and vomiting.  Per chart review - pt discharged 5/13 after admission for hemoperitoneum and peritoneal hematoma.

## 2022-05-21 NOTE — ED ADULT NURSE NOTE - OBJECTIVE STATEMENT
PT presents to ED for tachycardia. PT states she went to her PMD had EKG preformed and discharged.  PT still felt unwell so she called ambulance.  PT found to be febrile.  Sepsis initiated.  IV placed by u/s from MD Garcia.  PT on dialysis MELISSA fistula + bruis/thrill.  Labs obtained and pt medicated per orders.   CTM

## 2022-05-21 NOTE — ED PROVIDER NOTE - CARE PLAN
1 Principal Discharge DX:	Pulmonary edema  Secondary Diagnosis:	Cirrhosis  Secondary Diagnosis:	Atrial fibrillation

## 2022-05-21 NOTE — ED PROVIDER NOTE - CLINICAL SUMMARY MEDICAL DECISION MAKING FREE TEXT BOX
63 y/o F with a h/o ESRD on HD (M/W/F), CAD, CHFrEF (LVEF 40-45%), cirrhosis (?VELA, recurrent ascites requiring intermittent paracentesis), pAF (no a/c secondary to prior GIB), DM2, anemia, HTN, HLD, hypothyroid who presents with chest pain/weakness. Labs, imaging ordered

## 2022-05-21 NOTE — ED PROVIDER NOTE - PROGRESS NOTE DETAILS
Chi: Rechecked the patient and updated her on plan Chi: Rechecked the patient and updated her on plan, HR now in 70s.

## 2022-05-22 NOTE — CONSULT NOTE ADULT - SUBJECTIVE AND OBJECTIVE BOX
Patient is a 62y old  Female who presents with a chief complaint of SVT, PNA, rule out SBP       HPI:    63 yo female with PMH of ESRD on HD (M/W/F), CAD, CHFrEF (LVEF 40-45%), cirrhosis (?VELA, recurrent ascites requiring intermittent paracentesis) pAFib not on ac, DM2, chronic anemia, HTN, HLD, recent admission on 2 weeks back for pneumoperitoneum managed conservatively. during last admission she was also found to have SVT and has been maintained on beta blockers.  She was referred here today by her physician for chest pain and tachycardia. she reports she chest pain started yesterday with tachycardia, also reports generalized weakness. she is a very poor historian and history is limited. Denies LOC, seizure, focal weakness. she also reports rt sided abdominal pain present present for a while.    In the ED  tachycardic to 140 spontaneously converted  fever to 100.7 (see ED provider note)    ROS: negative except as noted in the HPI         PAST MEDICAL & SURGICAL HISTORY:  DM (diabetes mellitus)      HTN (hypertension)      HLD (hyperlipidemia)      End stage renal disease on dialysis      Pericardial effusion      Pleural effusion      Chronic systolic congestive heart failure      Anemia due to chronic kidney disease, unspecified CKD stage      Paroxysmal atrial fibrillation      Coronary artery disease, angina presence unspecified, unspecified vessel or lesion type, unspecified whether native or transplanted heart      Diabetes      End stage renal disease      Encounter for dialysis catheter care      A-V fistula      A-V fistula      Allergies    eggs (Anaphylaxis)  No Known Drug Allergies    Intolerances        MEDICATIONS  (STANDING):  amLODIPine   Tablet 10 milliGRAM(s) Oral daily  atorvastatin 40 milliGRAM(s) Oral at bedtime  calcium acetate 667 milliGRAM(s) Oral four times a day with meals  dextrose 5%. 1000 milliLiter(s) (50 mL/Hr) IV Continuous <Continuous>  dextrose 5%. 1000 milliLiter(s) (100 mL/Hr) IV Continuous <Continuous>  dextrose 50% Injectable 25 Gram(s) IV Push once  dextrose 50% Injectable 12.5 Gram(s) IV Push once  dextrose 50% Injectable 25 Gram(s) IV Push once  epoetin gian-epbx (RETACRIT) Injectable 29258 Unit(s) IV Push <User Schedule>  glucagon  Injectable 1 milliGRAM(s) IntraMuscular once  hydrALAZINE 25 milliGRAM(s) Oral three times a day  insulin lispro (ADMELOG) corrective regimen sliding scale   SubCutaneous three times a day before meals  levothyroxine 50 MICROGram(s) Oral daily  metoprolol tartrate 50 milliGRAM(s) Oral two times a day  pantoprazole    Tablet 40 milliGRAM(s) Oral before breakfast  piperacillin/tazobactam IVPB.. 3.375 Gram(s) IV Intermittent every 12 hours    MEDICATIONS  (PRN):  acetaminophen     Tablet .. 650 milliGRAM(s) Oral every 6 hours PRN Temp greater or equal to 38C (100.4F), Mild Pain (1 - 3)  dextrose Oral Gel 15 Gram(s) Oral once PRN Blood Glucose LESS THAN 70 milliGRAM(s)/deciliter  ondansetron Injectable 4 milliGRAM(s) IV Push every 8 hours PRN Nausea and/or Vomiting    acetaminophen     Tablet .. 650 milliGRAM(s) Oral every 6 hours PRN  amLODIPine   Tablet 10 milliGRAM(s) Oral daily  atorvastatin 40 milliGRAM(s) Oral at bedtime  calcium acetate 667 milliGRAM(s) Oral four times a day with meals  dextrose 5%. 1000 milliLiter(s) IV Continuous <Continuous>  dextrose 5%. 1000 milliLiter(s) IV Continuous <Continuous>  dextrose 50% Injectable 25 Gram(s) IV Push once  dextrose 50% Injectable 12.5 Gram(s) IV Push once  dextrose 50% Injectable 25 Gram(s) IV Push once  dextrose Oral Gel 15 Gram(s) Oral once PRN  epoetin gian-epbx (RETACRIT) Injectable 28146 Unit(s) IV Push <User Schedule>  glucagon  Injectable 1 milliGRAM(s) IntraMuscular once  hydrALAZINE 25 milliGRAM(s) Oral three times a day  insulin lispro (ADMELOG) corrective regimen sliding scale   SubCutaneous three times a day before meals  levothyroxine 50 MICROGram(s) Oral daily  metoprolol tartrate 50 milliGRAM(s) Oral two times a day  ondansetron Injectable 4 milliGRAM(s) IV Push every 8 hours PRN  pantoprazole    Tablet 40 milliGRAM(s) Oral before breakfast  piperacillin/tazobactam IVPB.. 3.375 Gram(s) IV Intermittent every 12 hours      FAMILY HISTORY:  Family history of kidney disease in brother (Sibling)        SOCIAL HISTORY:    CIGARETTES:  None    ALCOHOL: None      Vital Signs Last 24 Hrs  T(C): 36.5 (22 May 2022 10:14), Max: 36.9 (22 May 2022 05:02)  T(F): 97.7 (22 May 2022 10:14), Max: 98.5 (22 May 2022 05:02)  HR: 73 (22 May 2022 10:14) (62 - 127)  BP: 144/70 (22 May 2022 10:14) (113/78 - 170/79)  BP(mean): --  RR: 18 (22 May 2022 10:14) (17 - 19)  SpO2: 91% (22 May 2022 10:14) (90% - 98%)    Daily     Daily     I&O's Detail      PHYSICAL EXAM:  Appearance: Normal, well nourished	  HEENT:   Normal oral mucosa, PERRL, EOMI, sclera non-icteric	  Cardiovascular: Normal S1 S2, No JVD, No cardiac murmurs, No carotid bruits, No peripheral edema  Respiratory: Lungs clear to auscultation	  Psychiatry: A & O x 3, Mood & affect appropriate  Gastrointestinal:  Soft, Non-tender, + BS, no bruits	  Skin: No rashes, No ecchymoses, No cyanosis  Neurologic: Grossly non-focal with full strength in all four extremities  Extremities: Normal range of motion, No clubbing, cyanosis or edema  Vascular: Peripheral pulses palpable 2+ bilaterally      INTERPRETATION OF TELEMETRY:  NSR now    ECG:  SVT.  Most likely AVNRT.  NO Afib is seen on the ECG's reviewed.    LABS:                        9.1    4.12  )-----------( 255      ( 22 May 2022 03:32 )             30.1     05-22    135  |  93<L>  |  15.3  ----------------------------<  197<H>  4.3   |  27.0  |  4.21<H>    Ca    8.4<L>      22 May 2022 03:32  Mg     1.9     05-21    TPro  6.4<L>  /  Alb  2.6<L>  /  TBili  0.5  /  DBili  x   /  AST  14  /  ALT  7   /  AlkPhos  66  05-22    CARDIAC MARKERS ( 21 May 2022 15:37 )  x     / 0.41 ng/mL / x     / x     / x          PT/INR - ( 22 May 2022 03:32 )   PT: 13.5 sec;   INR: 1.16 ratio         PTT - ( 21 May 2022 13:47 )  PTT:29.4 sec    I&O's Summary    BNP  RADIOLOGY & ADDITIONAL STUDIES:    Assessment:  63 yo F w/ h/o mild NICM (LVEF 40-45% by TTE in 02/2021; 2019: negative LHC), h/o PAF (2017 [facility]: brief inpatient episode likely secondary to medical procedure; [facility]OT 28-day in 03/2021: 0 episodes of AF, therefore A/C discontinued), ESRD, cirrhosis (~VELA; Q 2week paracentesis regimen), DM, HTN, HLD who presented to [facility] ED on 05/10 with abdominal pain/hemoperitoneum/peritoneal hematoma in the setting of IR large volume paracentesis that same day. She was treated conservatively.  She did have SVT responsive to Adenosine on that admission and has been maintained on beta blockers.    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 PAF  3. NICM: TTE 05/12 similar to past    She was referred here  by her physician for chest pain and tachycardia. she reports she chest pain started yesterday with tachycardia, also reports generalized weakness. she is a very poor historian and history is limited. Denies LOC, seizure, focal weakness. She also reports rt sided abdominal pain present present for a while.    In the eD  tachycardic to 140 spontaneously converted  fever to 100.7 (see ED provider note)    Plan:  Continue bet blockers  Possible paracentesis  Will have EP (dr Hernandes) review   [facility] to resume care as of 5/23/2022.

## 2022-05-22 NOTE — H&P ADULT - NSHPPHYSICALEXAM_GEN_ALL_CORE
Vital Signs Last 24 Hrs  T(C): 36.7 (21 May 2022 23:34), Max: 37.1 (21 May 2022 13:05)  T(F): 98.1 (21 May 2022 23:34), Max: 98.8 (21 May 2022 13:05)  HR: 68 (21 May 2022 23:34) (62 - 142)  BP: 151/83 (21 May 2022 23:34) (113/78 - 151/83)  BP(mean): --  RR: 19 (21 May 2022 23:34) (17 - 19)  SpO2: 96% (21 May 2022 23:34) (90% - 99%)      PHYSICAL EXAM:  GENERAL: not in apparent distress  HEAD:  Atraumatic, Normocephalic  EYES: EOMI, PERRLA, conjunctiva and sclera clear  NECK: Supple, No JVD  CHEST/LUNG: dec BS bilateral, crackles at bases more on the right  HEART: Regular rate and rhythm; No murmurs, rubs, or gallops  ABDOMEN: Soft, , distended+, paraumbillical hernia+ reducible, mild epigastric tenderness noted on deep palpation  EXTREMITIES:  2+ Peripheral Pulses, No clubbing, cyanosis, or edema  PSYCH: AAOx3  NEUROLOGY: non-focal  SKIN: arm -left av fistula

## 2022-05-22 NOTE — H&P ADULT - ASSESSMENT
63 yo female with PMH of ESRD on HD (M/W/F), CAD, CHFrEF (LVEF 40-45%), cirrhosis (?VELA, recurrent ascites requiring intermittent paracentesis) pAFib not on ac, DM2, chronic anemia, HTN, HLD, recent admission on 2 weeks back for pneumoperitoneum managed conservatively. during last admission she was also found to have SVT seen by cardiology. She was referred here today by her physician for chest pain and tachycardia. she reports she chest pain started yesterday with tachycardia, also reports generalized weakness. she is a very poor historian and history is limited. Denies LOC, seizure, focal weakness. she also reports rt sided abdominal pain present present for a while.    Impression:    # Tachycardia  EKG in ED shows accelerated junctional tachycardia, unknown if patient also had transient afib with rvr outpatient  patient not on AC due to high bleeding risk     63 yo female with PMH of ESRD on HD (M/W/F), CAD, CHFrEF (LVEF 40-45%), cirrhosis (?VELA, recurrent ascites requiring intermittent paracentesis) pAFib not on ac, DM2, chronic anemia, HTN, HLD, recent admission on 2 weeks back for pneumoperitoneum managed conservatively. during last admission she was also found to have SVT seen by cardiology. She was referred here today by her physician for chest pain and tachycardia. she reports she chest pain started yesterday with tachycardia, also reports generalized weakness. she is a very poor historian and history is limited. Denies LOC, seizure, focal weakness. she also reports rt sided abdominal pain present present for a while.    Impression:    # Afib with RVR spontaneously converted in ED (as per the signout from ED provider)  EKG performed  in ED later shows accelerated junctional tachycardia  patient not on AC due to high bleeding risk  start on lopressor 25 bid  telemetry monitoring  trend cardiac enzyme  cardiology consulted  monitor lytes    # Fever unclear source; r/o SBP  blood culture obtained  order ua  s/p vanc and zosyn in ed  ED did not perform peritoneal tap due to recent hemoperitoneum  consult ir to perform diagnostic and possibly therapeutic paracentesis  imaging reveals pulmonary edema with (rt lower lobe consolidation mildly worse then prior)  continue zosyn for now  mrsa swab    # Liver Cirrhosis:  secondary to VELA  monitor  on zosyn for suspected SBP  consult IR in am for possible tap    # Pulmonary edema:  from volume overload with h/o NICM with ESRD  currently req 2 liter O2 much comfortable  nephrology consult for dialysis in am  cardiology consulted    # ESRD on HD:  nephrology consulted  left Arm av fistula    # recent hemoperitoneum:  was cleared to take aspirin since prior discharge  hold aspirin tonight in anticipation for tap (high risk for bleed) then restart later  per the ct imaging now---hemoperitoneum resolving but persistent ascitis    # Anemia of Chronic disease:  monitor H and H  f/u with nephrology    #HTN-Essential  monitor blood pressure  metoprolol    #DVT Prophylaxis  venodynes

## 2022-05-22 NOTE — H&P ADULT - HISTORY OF PRESENT ILLNESS
ED  VI used for interpretation services.  61 yo female with PMH of ESRD on HD (M/W/F), CAD, CHFrEF (LVEF 40-45%), cirrhosis (?VELA, recurrent ascites requiring intermittent paracentesis) pAFib not on ac, DM2, chronic anemia, HTN, HLD, recent admission on 2 weeks back for pneumoperitoneum managed conservatively. during last admission she was also found to have SVT seen by cardiology. She was referred here today by her physician for chest pain and tachycardia. she reports she chest pain started yesterday with tachycardia, also reports generalized weakness. she is a very poor historian and history is limited. Denies LOC, seizure, focal weakness. she also reports rt sided abdominal pain present present for a while.    ROS: negative except as noted in the HPI   ED  VI used for interpretation services.  63 yo female with PMH of ESRD on HD (M/W/F), CAD, CHFrEF (LVEF 40-45%), cirrhosis (?VELA, recurrent ascites requiring intermittent paracentesis) pAFib not on ac, DM2, chronic anemia, HTN, HLD, recent admission on 2 weeks back for pneumoperitoneum managed conservatively. during last admission she was also found to have SVT seen by cardiology. She was referred here today by her physician for chest pain and tachycardia. she reports she chest pain started yesterday with tachycardia, also reports generalized weakness. she is a very poor historian and history is limited. Denies LOC, seizure, focal weakness. she also reports rt sided abdominal pain present present for a while.    In the eD  tachycardic to 140 spontaneously converted  fever to 100.7 (see ED provider note)    ROS: negative except as noted in the HPI

## 2022-05-22 NOTE — CONSULT NOTE ADULT - SUBJECTIVE AND OBJECTIVE BOX
HPI: The pt is a 63 yo female with PMH +ESRD on HD (M/W/F) who presented to the ED with fever, tachycardia and sob. Reportedly she spontaneously resolved and HR now improved.  Pt otherwise comfortable, no acute distress noted.      PAST MEDICAL & SURGICAL HISTORY:  DM (diabetes mellitus)      HTN (hypertension)      HLD (hyperlipidemia)      End stage renal disease on dialysis      Pericardial effusion      Pleural effusion      Chronic systolic congestive heart failure      Paroxysmal atrial fibrillation      Coronary artery disease, angina presence unspecified, unspecified vessel or lesion type, unspecified whether native or transplanted heart          FAMILY HISTORY:  Family history of kidney disease in brother (Sibling)        Social History:  No tobacco; no etoh nor drug abuse      MEDICATIONS  (STANDING):  amLODIPine   Tablet 10 milliGRAM(s) Oral daily  atorvastatin 40 milliGRAM(s) Oral at bedtime  calcium acetate 667 milliGRAM(s) Oral four times a day with meals  hydrALAZINE 25 milliGRAM(s) Oral three times a day  levothyroxine 50 MICROGram(s) Oral daily  metoprolol tartrate 50 milliGRAM(s) Oral two times a day  pantoprazole    Tablet 40 milliGRAM(s) Oral before breakfast  piperacillin/tazobactam IVPB.. 3.375 Gram(s) IV Intermittent every 12 hours    MEDICATIONS  (PRN):  acetaminophen     Tablet .. 650 milliGRAM(s) Oral every 6 hours PRN Temp greater or equal to 38C (100.4F), Mild Pain (1 - 3)  ondansetron Injectable 4 milliGRAM(s) IV Push every 8 hours PRN Nausea and/or Vomiting      Allergies    eggs (Anaphylaxis)  No Known Drug Allergies    Intolerances        REVIEW OF SYSTEMS:    CONSTITUTIONAL: No fever, weight loss, + fatigue  EYES: No eye pain, visual disturbances, or discharge  ENMT:  No difficulty hearing, tinnitus, vertigo; No sinus or throat pain  NECK: No pain or stiffness  BREASTS: No pain, masses, or nipple discharge  RESPIRATORY: No cough, wheezing, chills or hemoptysis; + mild shortness of breath  CARDIOVASCULAR: +palpitations; no dizziness, or leg swelling  GASTROINTESTINAL: No abdominal or epigastric pain. No nausea, vomiting, or hematemesis; No diarrhea or constipation. No melena or hematochezia.  GENITOURINARY: No dysuria, frequency, hematuria, or incontinence  NEUROLOGICAL: No headaches, memory loss, loss of strength, numbness, or tremors  SKIN: No itching, burning, rashes, or lesions   MUSCULOSKELETAL: No joint pain or swelling; No muscle, back, or extremity pain      Vital Signs Last 24 Hrs  T(C): 36.9 (22 May 2022 05:02), Max: 37.1 (21 May 2022 13:05)  T(F): 98.5 (22 May 2022 05:02), Max: 98.8 (21 May 2022 13:05)  HR: 66 (22 May 2022 05:02) (62 - 142)  BP: 170/79 (22 May 2022 05:02) (113/78 - 170/79)  BP(mean): --  RR: 18 (22 May 2022 05:02) (17 - 19)  SpO2: 92% (22 May 2022 05:02) (90% - 99%)    PHYSICAL EXAM:  GENERAL: Chronically debilitated  HEAD:  Atraumatic, Normocephalic  EYES: Conjunctiva and sclera clear  ENMT: Moist mucous membranes, Good dentition, No lesions  NECK: Supple, No JVD  NERVOUS SYSTEM:  Awake, alert; non focal  CHEST/LUNG: Clear bilaterally  HEART: Regular rate and rhythm; No rub  ABDOMEN: Soft, Nontender, +BS  EXTREMITIES:  2+ Peripheral Pulses, No dependent edema  SKIN: No rashes or lesions      LABS:                        9.1    4.12  )-----------( 255      ( 22 May 2022 03:32 )             30.1     05-22    135  |  93<L>  |  15.3  ----------------------------<  197<H>  4.3   |  27.0  |  4.21<H>    Ca    8.4<L>      22 May 2022 03:32  Mg     1.9     05-21    TPro  6.4<L>  /  Alb  2.6<L>  /  TBili  0.5  /  DBili  x   /  AST  14  /  ALT  7   /  AlkPhos  66  05-22    PT/INR - ( 22 May 2022 03:32 )   PT: 13.5 sec;   INR: 1.16 ratio         PTT - ( 21 May 2022 13:47 )  PTT:29.4 sec    Magnesium, Serum: 1.9 mg/dL (05-21 @ 15:37)      RADIOLOGY & ADDITIONAL TESTS:  < from: CT Abdomen and Pelvis No Cont (05.21.22 @ 18:49) >    ACC: [id number] EXAM:  CT ABDOMEN AND PELVIS                        ACC: [id number] EXAM:  CT CHEST                          PROCEDURE DATE:  05/21/2022          INTERPRETATION:  CLINICAL INFORMATION: Fever, chest pain, shortness of   breath, abdominalpain, recent history of hemoperitoneum.    COMPARISON: CT abdomen and pelvis 5/11/2022, CT chest 4/8/2021    CONTRAST/COMPLICATIONS:  IV Contrast: NONE  Oral Contrast: NONE  Complications: None reported at time of study completion    PROCEDURE:  CT of the Chest, Abdomen and Pelvis was performed.  Sagittal and coronal reformats were performed.    FINDINGS:  Limited evaluation of the vasculature and viscera in the absence of   intravenous contrast.    CHEST:  LUNGS AND LARGE AIRWAYS: The central airways are patent. Bronchial wall   thickening in the right lung, most pronounced in the right lower lobe.   Interlobular septal thickening and ground glass opacities within the   right greater than left lungs, suggestive of pulmonary edema. Patchy   areas of focal consolidation within the right lower lobe, which may be   secondary to atelectasis combined with pulmonary edema. A 9 mm nodule   within the right middle lobe is unchanged from 4/8/2021.  PLEURA: Trace right greater than left pleural effusions.  VESSELS: Atherosclerotic calcifications. Coronary artery calcifications.  HEART: Cardiomegaly. No pericardial effusion.  MEDIASTINUM AND YOLY: Enlarged mediastinal lymph nodes, overall similar   to 4/8/2021. For example, a prevascular lymph node measures 1.8 x 1.2 cm   (series 4 image 40), previously 1.8 x 1.1 cm, and an AP window lymph node   measures 2.8 x 1.4 cm (series 4 image 45), previously 2.7 x 1.8 cm.  CHEST WALL AND LOWER NECK: Within normal limits.    ABDOMEN AND PELVIS:  LIVER: Within normal limits.  BILE DUCTS: Cholelithiasis  GALLBLADDER: Within normal limits.  SPLEEN: Within normal limits.  PANCREAS: Within normal limits.  ADRENALS: Within normal limits.  KIDNEYS/URETERS: The kidneys are atrophic. Left renal cyst and bilateral   subcentimeter hypodense foci that are too small to characterize. No   hydronephrosis.    BLADDER: Underdistended.  REPRODUCTIVE ORGANS: Uterus and adnexa within normal limits.    BOWEL: Small bowel anastomosis in the left lower quadrant. No bowel   obstruction. Colonic diverticulosis. Appendix is not visualized.  PERITONEUM: Large amount of free abdominopelvic fluid, unchanged in   volume from 5/11/2022, though with overall mild decreased density in the   interim. Hyperdensity most prominently along the right hemiabdomen is   compatible with blood clot, with some redistribution and subjectively   decreased amount since the prior CT.  VESSELS: Atherosclerotic calcifications.  RETROPERITONEUM/LYMPH NODES: No lymphadenopathy.  ABDOMINALWALL: Anasarca. Ventral hernia containing ascites fluid or   possibly nonobstructed small bowel.  BONES: Degenerative changes. Redemonstrated chronic left L3 and L4   transverse process fractures.    IMPRESSION:  Limited noncontrast exam.    Pulmonary edema and trace bilateral pleural effusions. Patchy   consolidative opacities within the right lower lobe may be secondary to   atelectasis combined with pulmonary edema, though superimposed infection   is not excluded.    Redemonstrated findings ofhemoperitoneum. Large volume of free   abdominopelvic fluid with overall mild decreased density since the prior   CT, suggestive of decreased hemorrhagic content. Blood clot most   prominently along the right hemiabdomen, with some redistribution, which   also appears subjectively decreased in amount in the interim. Limited   evaluation for active hemorrhage in the absence of intravenous contrast.    < end of copied text >

## 2022-05-22 NOTE — CONSULT NOTE ADULT - ASSESSMENT
ESRD: fever, tachyarrhythmia  - will arrange for HD tomorrow  - pt comfortable and no acute indication for treatment urgently  - empiric antibiotics; follow up cultures    Anemia: +CKD; + recent hemoperitoneum  - add MARGI at HD  - PRBCs as needed    RO: +CKD  - cont Phoslo  - low phos diet

## 2022-05-23 NOTE — CONSULT NOTE ADULT - NS ATTEND AMEND GEN_ALL_CORE FT
61 yo female with PMH of ESRD on HD (M/W/F), CAD, CHFrEF (LVEF 40-45%), pAFib not on ac, DM2, chronic anemia, HTN, HLD, incarcerated ventral hernia (s/p partial SBR in 03/21), , recurrent ascites requiring intermittent paracentesis) admitted for RVR. Was febrile in the ER, sepsis workup underway, recommended diagnostic paracentesis although yield may be limited as she has been on abx already.     Etiology of ascites likely cardiac, as total protein > 2.5 and SAAG > 1.1. Has e/o right heart failure on echo, moderate RA and RV dilatation. Optimize cardiac/volume status.      Reviewed multiple CT scans and US, CT from 12/21 mentions slightly nodular liver but rest of the scans state normal liver, including one done recently, continue f/u with Dr Shell as outpatient.    labs/notes/imaging reviewed, plan discussed with GI NP

## 2022-05-23 NOTE — CONSULT NOTE ADULT - ASSESSMENT
63 yo female with PMH of ESRD on HD (M/W/F), CAD, CHFrEF (LVEF 40-45%), pAFib not on ac, DM2, chronic anemia, HTN, HLD, incarcerated ventral hernia (s/p partial SBR in 03/21), ?cirrhosis (VELA, recurrent ascites requiring intermittent paracentesis), recent admission 2 weeks back for pneumoperitoneum managed conservatively. During last admission she was also found to have SVT and has been maintained on beta blockers. She was referred here today by her physician for chest pain and tachycardia. In ED pt was tachycardic found to be in Afib RVR w/ HR in 140s spontaneously converted. Reported fever to 100.7F, sepsis work up in progress, on empiric antibiotics.   Previously seen by GI in 12/2021 for recurrent nausea, vomiting with EGD done showing non-erosive gastritis. Otherwise normal.  Last LVP - on 5/11 - Dr Mahonye 4.7l was drained.   Likely cardiac ascites as it is unclear whether patient has chronic liver disease. CT A/P from 12/21 showed Nodular contour, concerning for cirrhosis However prior imaging and subsequent CT-scan post December showed normal liver. No coagulopathy, no thrombocytopenia.   Ascitic fluid from tap in 12/21 showed no evidence of SBP. BF total protein >2.5. SAAG> 1.1.   ESRD. HD today.   Possible IR paracentesis today.   Continue to trend CBC, LFTs, coags.   Diet as tolerated  Last seen Hepatologist Dr. Shell 02/22. Will need to F/u outpatient for possible fibroid scan to further evaluate liver.        61 yo female with PMH of ESRD on HD (M/W/F), CAD, CHFrEF (LVEF 40-45%), pAFib not on ac, DM2, chronic anemia, HTN, HLD, incarcerated ventral hernia (s/p partial SBR in 03/21), ?cirrhosis (VELA, recurrent ascites requiring intermittent paracentesis), recent admission 2 weeks back for pneumoperitoneum managed conservatively. During last admission she was also found to have SVT and has been maintained on beta blockers. She was referred here today by her physician for chest pain and tachycardia. In ED pt was tachycardic found to be in Afib RVR w/ HR in 140s spontaneously converted. Reported fever to 100.7F, sepsis work up in progress, on empiric antibiotics.   Previously seen by GI in 12/2021 for recurrent nausea, vomiting with EGD done showing non-erosive gastritis. Otherwise normal.  Last LVP - on 5/11 - Dr Mahoney 4.7l was drained.   Likely cardiac ascites as it is unclear whether patient has chronic liver disease. CT A/P from 12/21 showed Nodular contour, concerning for cirrhosis However prior imaging and subsequent CT-scan post December showed normal liver. No coagulopathy, no thrombocytopenia.   Ascitic fluid from tap in 12/21 showed no evidence of SBP. BF total protein >2.5. SAAG> 1.1 suggestive of cardiac ascites.   ESRD. HD today.   Possible IR paracentesis today.   Continue to trend CBC, LFTs, coags.   Diet as tolerated  Last seen Hepatologist Dr. Shell 02/22. Will need to F/u outpatient for possible fibroscan to further evaluate liver.

## 2022-05-23 NOTE — PROGRESS NOTE ADULT - SUBJECTIVE AND OBJECTIVE BOX
NEPHROLOGY INTERVAL HPI/OVERNIGHT EVENTS:  pt comfortable  no acute distress noted    MEDICATIONS  (STANDING):  amLODIPine   Tablet 10 milliGRAM(s) Oral daily  atorvastatin 40 milliGRAM(s) Oral at bedtime  calcium acetate 667 milliGRAM(s) Oral four times a day with meals  dextrose 5%. 1000 milliLiter(s) (50 mL/Hr) IV Continuous <Continuous>  dextrose 5%. 1000 milliLiter(s) (100 mL/Hr) IV Continuous <Continuous>  dextrose 50% Injectable 25 Gram(s) IV Push once  dextrose 50% Injectable 12.5 Gram(s) IV Push once  dextrose 50% Injectable 25 Gram(s) IV Push once  epoetin gian-epbx (RETACRIT) Injectable 86889 Unit(s) IV Push <User Schedule>  glucagon  Injectable 1 milliGRAM(s) IntraMuscular once  hydrALAZINE 25 milliGRAM(s) Oral three times a day  insulin lispro (ADMELOG) corrective regimen sliding scale   SubCutaneous three times a day before meals  levothyroxine 50 MICROGram(s) Oral daily  metoprolol tartrate 50 milliGRAM(s) Oral two times a day  pantoprazole    Tablet 40 milliGRAM(s) Oral before breakfast  piperacillin/tazobactam IVPB.. 3.375 Gram(s) IV Intermittent every 12 hours    MEDICATIONS  (PRN):  acetaminophen     Tablet .. 650 milliGRAM(s) Oral every 6 hours PRN Temp greater or equal to 38C (100.4F), Mild Pain (1 - 3)  dextrose Oral Gel 15 Gram(s) Oral once PRN Blood Glucose LESS THAN 70 milliGRAM(s)/deciliter  ondansetron Injectable 4 milliGRAM(s) IV Push every 8 hours PRN Nausea and/or Vomiting      Allergies    eggs (Anaphylaxis)  No Known Drug Allergies          Vital Signs Last 24 Hrs  T(C): 36.4 (23 May 2022 07:10), Max: 37 (22 May 2022 18:13)  T(F): 97.6 (23 May 2022 07:10), Max: 98.6 (22 May 2022 18:13)  HR: 65 (23 May 2022 07:10) (65 - 73)  BP: 170/75 (23 May 2022 07:10) (144/70 - 170/75)  BP(mean): --  RR: 18 (23 May 2022 07:10) (18 - 18)  SpO2: 95% (23 May 2022 07:10) (91% - 99%)    PHYSICAL EXAM:  GENERAL: fatigued  HEAD:  Atraumatic, Normocephalic  NECK: Supple, No JVD  NERVOUS SYSTEM:  Awake, alert; non focal  CHEST/LUNG: Clear bilaterally  HEART: Regular rate and rhythm; No rub  ABDOMEN: Soft, Nontender, +BS  EXTREMITIES:  2+ Peripheral Pulses, No dependent edema  SKIN: No rashes or lesions    LABS:                        9.9    5.35  )-----------( 292      ( 23 May 2022 06:45 )             32.9     05-23    131<L>  |  92<L>  |  27.1<H>  ----------------------------<  201<H>  5.3   |  25.0  |  5.37<H>    Ca    8.9      23 May 2022 06:45  Mg     1.9     05-21    TPro  6.4<L>  /  Alb  2.6<L>  /  TBili  0.5  /  DBili  x   /  AST  14  /  ALT  7   /  AlkPhos  66  05-22    PT/INR - ( 22 May 2022 03:32 )   PT: 13.5 sec;   INR: 1.16 ratio         PTT - ( 21 May 2022 13:47 )  PTT:29.4 sec        RADIOLOGY & ADDITIONAL TESTS:  < from: CT Abdomen and Pelvis No Cont (05.21.22 @ 18:49) >    ACC: [id number] EXAM:  CT ABDOMEN AND PELVIS                        ACC: [id number] EXAM:  CT CHEST                          PROCEDURE DATE:  05/21/2022          INTERPRETATION:  CLINICAL INFORMATION: Fever, chest pain, shortness of   breath, abdominalpain, recent history of hemoperitoneum.    COMPARISON: CT abdomen and pelvis 5/11/2022, CT chest 4/8/2021    CONTRAST/COMPLICATIONS:  IV Contrast: NONE  Oral Contrast: NONE  Complications: None reported at time of study completion    PROCEDURE:  CT of the Chest, Abdomen and Pelvis was performed.  Sagittal and coronal reformats were performed.    FINDINGS:  Limited evaluation of the vasculature and viscera in the absence of   intravenous contrast.    CHEST:  LUNGS AND LARGE AIRWAYS: The central airways are patent. Bronchial wall   thickening in the right lung, most pronounced in the right lower lobe.   Interlobular septal thickening and ground glass opacities within the   right greater than left lungs, suggestive of pulmonary edema. Patchy   areas of focal consolidation within the right lower lobe, which may be   secondary to atelectasis combined with pulmonary edema. A 9 mm nodule   within the right middle lobe is unchanged from 4/8/2021.  PLEURA: Trace right greater than left pleural effusions.  VESSELS: Atherosclerotic calcifications. Coronary artery calcifications.  HEART: Cardiomegaly. No pericardial effusion.  MEDIASTINUM AND YOLY: Enlarged mediastinal lymph nodes, overall similar   to 4/8/2021. For example, a prevascular lymph node measures 1.8 x 1.2 cm   (series 4 image 40), previously 1.8 x 1.1 cm, and an AP window lymph node   measures 2.8 x 1.4 cm (series 4 image 45), previously 2.7 x 1.8 cm.  CHEST WALL AND LOWER NECK: Within normal limits.    ABDOMEN AND PELVIS:  LIVER: Within normal limits.  BILE DUCTS: Cholelithiasis  GALLBLADDER: Within normal limits.  SPLEEN: Within normal limits.  PANCREAS: Within normal limits.  ADRENALS: Within normal limits.  KIDNEYS/URETERS: The kidneys are atrophic. Left renal cyst and bilateral   subcentimeter hypodense foci that are too small to characterize. No   hydronephrosis.    BLADDER: Underdistended.  REPRODUCTIVE ORGANS: Uterus and adnexa within normal limits.    BOWEL: Small bowel anastomosis in the left lower quadrant. No bowel   obstruction. Colonic diverticulosis. Appendix is not visualized.  PERITONEUM: Large amount of free abdominopelvic fluid, unchanged in   volume from 5/11/2022, though with overall mild decreased density in the   interim. Hyperdensity most prominently along the right hemiabdomen is   compatible with blood clot, with some redistribution and subjectively   decreased amount since the prior CT.  VESSELS: Atherosclerotic calcifications.  RETROPERITONEUM/LYMPH NODES: No lymphadenopathy.  ABDOMINALWALL: Anasarca. Ventral hernia containing ascites fluid or   possibly nonobstructed small bowel.  BONES: Degenerative changes. Redemonstrated chronic left L3 and L4   transverse process fractures.    IMPRESSION:  Limited noncontrast exam.    Pulmonary edema and trace bilateral pleural effusions. Patchy   consolidative opacities within the right lower lobe may be secondary to   atelectasis combined with pulmonary edema, though superimposed infection   is not excluded.    Redemonstrated findings ofhemoperitoneum. Large volume of free   abdominopelvic fluid with overall mild decreased density since the prior   CT, suggestive of decreased hemorrhagic content. Blood clot most   prominently along the right hemiabdomen, with some redistribution, which   also appears subjectively decreased in amount in the interim. Limited   evaluation for active hemorrhage in the absence of intravenous contrast.    < end of copied text >

## 2022-05-23 NOTE — PROGRESS NOTE ADULT - SUBJECTIVE AND OBJECTIVE BOX
JAROCHO MORALES    [id number]    62y      Female     services utilized over phone     CC: Admitted with fever, sob.    poor historian  feels better    INTERVAL HPI/OVERNIGHT EVENTS: no acute events     REVIEW OF SYSTEMS:    CONSTITUTIONAL: No fever, fatigue  RESPIRATORY: No cough, wheezing,  No shortness of breath  CARDIOVASCULAR: No chest pain, palpitations  GASTROINTESTINAL: No abdominal or epigastric pain. No nausea, vomiting        Vital Signs Last 24 Hrs  T(C): 36.4 (23 May 2022 07:10), Max: 37 (22 May 2022 18:13)  T(F): 97.6 (23 May 2022 07:10), Max: 98.6 (22 May 2022 18:13)  HR: 65 (23 May 2022 07:10) (65 - 71)  BP: 170/75 (23 May 2022 07:10) (157/77 - 170/75)  BP(mean): --  RR: 18 (23 May 2022 07:10) (18 - 18)  SpO2: 95% (23 May 2022 07:10) (95% - 99%)    PHYSICAL EXAM:    GENERAL: NAD, non-toxic appearing however chronically ill appearing   HEENT: PERRL, +EOMI  NECK: soft, Supple, No JVD  CHEST/LUNG: Clear to percussion bilaterally; No wheezing  HEART: S1S2+, Regular rate and rhythm; No murmur  ABDOMEN: Soft, Nontender, distended; paraumbilical hernia, soft reducible, bowel sounds present  EXTREMITIES:  No clubbing, cyanosis, or edema  SKIN: No rashes or lesions  NEURO: AAOX3        LABS:                        9.9    5.35  )-----------( 292      ( 23 May 2022 06:45 )             32.9     05-23    131<L>  |  92<L>  |  27.1<H>  ----------------------------<  201<H>  5.3   |  25.0  |  5.37<H>    Ca    8.9      23 May 2022 06:45  Mg     1.9     05-21    TPro  6.4<L>  /  Alb  2.6<L>  /  TBili  0.5  /  DBili  x   /  AST  14  /  ALT  7   /  AlkPhos  66  05-22    PT/INR - ( 22 May 2022 03:32 )   PT: 13.5 sec;   INR: 1.16 ratio         PTT - ( 21 May 2022 13:47 )  PTT:29.4 sec        MEDICATIONS  (STANDING):  amLODIPine   Tablet 10 milliGRAM(s) Oral daily  atorvastatin 40 milliGRAM(s) Oral at bedtime  calcium acetate 667 milliGRAM(s) Oral four times a day with meals  dextrose 5%. 1000 milliLiter(s) (50 mL/Hr) IV Continuous <Continuous>  dextrose 5%. 1000 milliLiter(s) (100 mL/Hr) IV Continuous <Continuous>  dextrose 50% Injectable 25 Gram(s) IV Push once  dextrose 50% Injectable 12.5 Gram(s) IV Push once  dextrose 50% Injectable 25 Gram(s) IV Push once  epoetin gian-epbx (RETACRIT) Injectable 00501 Unit(s) IV Push <User Schedule>  glucagon  Injectable 1 milliGRAM(s) IntraMuscular once  hydrALAZINE 25 milliGRAM(s) Oral three times a day  insulin lispro (ADMELOG) corrective regimen sliding scale   SubCutaneous three times a day before meals  levothyroxine 50 MICROGram(s) Oral daily  metoprolol tartrate 50 milliGRAM(s) Oral two times a day  pantoprazole    Tablet 40 milliGRAM(s) Oral before breakfast  piperacillin/tazobactam IVPB.. 3.375 Gram(s) IV Intermittent every 12 hours    MEDICATIONS  (PRN):  acetaminophen     Tablet .. 650 milliGRAM(s) Oral every 6 hours PRN Temp greater or equal to 38C (100.4F), Mild Pain (1 - 3)  dextrose Oral Gel 15 Gram(s) Oral once PRN Blood Glucose LESS THAN 70 milliGRAM(s)/deciliter  ondansetron Injectable 4 milliGRAM(s) IV Push every 8 hours PRN Nausea and/or Vomiting      RADIOLOGY & ADDITIONAL TESTS:

## 2022-05-23 NOTE — PROGRESS NOTE ADULT - SUBJECTIVE AND OBJECTIVE BOX
Patient is a 62y old  Female who presents with a chief complaint of SVT  PNA  rule out SBP (23 May 2022 12:18)        PAST MEDICAL & SURGICAL HISTORY:  DM (diabetes mellitus)      HTN (hypertension)      HLD (hyperlipidemia)      End stage renal disease on dialysis      Pericardial effusion      Pleural effusion      Chronic systolic congestive heart failure      Anemia due to chronic kidney disease, unspecified CKD stage      Paroxysmal atrial fibrillation      Coronary artery disease, angina presence unspecified, unspecified vessel or lesion type, unspecified whether native or transplanted heart      Diabetes      End stage renal disease      Encounter for dialysis catheter care      A-V fistula      A-V fistula      MEDICATIONS  (STANDING):  amLODIPine   Tablet 10 milliGRAM(s) Oral daily  atorvastatin 40 milliGRAM(s) Oral at bedtime  calcium acetate 667 milliGRAM(s) Oral four times a day with meals  dextrose 5%. 1000 milliLiter(s) (50 mL/Hr) IV Continuous <Continuous>  dextrose 5%. 1000 milliLiter(s) (100 mL/Hr) IV Continuous <Continuous>  dextrose 50% Injectable 25 Gram(s) IV Push once  dextrose 50% Injectable 12.5 Gram(s) IV Push once  dextrose 50% Injectable 25 Gram(s) IV Push once  epoetin gian-epbx (RETACRIT) Injectable 35449 Unit(s) IV Push <User Schedule>  glucagon  Injectable 1 milliGRAM(s) IntraMuscular once  heparin   Injectable 5000 Unit(s) SubCutaneous every 12 hours  hydrALAZINE 25 milliGRAM(s) Oral three times a day  insulin lispro (ADMELOG) corrective regimen sliding scale   SubCutaneous three times a day before meals  levothyroxine 50 MICROGram(s) Oral daily  metoprolol tartrate 50 milliGRAM(s) Oral two times a day  pantoprazole    Tablet 40 milliGRAM(s) Oral before breakfast  piperacillin/tazobactam IVPB.. 3.375 Gram(s) IV Intermittent every 12 hours    MEDICATIONS  (PRN):  acetaminophen     Tablet .. 650 milliGRAM(s) Oral every 6 hours PRN Temp greater or equal to 38C (100.4F), Mild Pain (1 - 3)  dextrose Oral Gel 15 Gram(s) Oral once PRN Blood Glucose LESS THAN 70 milliGRAM(s)/deciliter  ondansetron Injectable 4 milliGRAM(s) IV Push every 8 hours PRN Nausea and/or Vomiting  Vital Signs Last 24 Hrs  T(C): 37 (23 May 2022 13:55), Max: 37 (22 May 2022 18:13)  T(F): 98.6 (23 May 2022 13:55), Max: 98.6 (22 May 2022 18:13)  HR: 61 (23 May 2022 13:55) (61 - 71)  BP: 152/71 (23 May 2022 13:55) (132/77 - 170/75)  BP(mean): --  RR: 18 (23 May 2022 13:55) (18 - 18)  SpO2: 91% (23 May 2022 13:55) (91% - 99%)    PHYSICAL EXAM:    GENERAL: In no apparent distress, well nourished, and hydrated.  HEART: Regular rate and rhythm; No murmurs, rubs, or gallops.  PULMONARY: Clear to auscultation and perfusion.  No rales, wheezing, or rhonchi bilaterally.  ABDOMEN: Soft, Nontender, Nondistended; Bowel sounds present  EXTREMITIES:  2+ Peripheral Pulses, No clubbing, cyanosis, or edema          ECG:  SVT    I&O's Detail      LABS:                        9.9    5.35  )-----------( 292      ( 23 May 2022 06:45 )             32.9     05-23    131<L>  |  92<L>  |  27.1<H>  ----------------------------<  201<H>  5.3   |  25.0  |  5.37<H>    Ca    8.9      23 May 2022 06:45  Mg     1.9     05-21    TPro  6.4<L>  /  Alb  2.6<L>  /  TBili  0.5  /  DBili  x   /  AST  14  /  ALT  7   /  AlkPhos  66  05-22    CARDIAC MARKERS ( 21 May 2022 15:37 )  x     / 0.41 ng/mL / x     / x     / x          PT/INR - ( 22 May 2022 03:32 )   PT: 13.5 sec;   INR: 1.16 ratio             BNP  I&O's Detail    Daily     Daily     RADIOLOGY & ADDITIONAL STUDIES:

## 2022-05-23 NOTE — CONSULT NOTE ADULT - SUBJECTIVE AND OBJECTIVE BOX
Patient is a 62y old  Female who presents with a chief complaint of SVT  PNA  rule out SBP (23 May 2022 09:35)      HPI:  63 yo female with PMH of ESRD on HD (M/W/F), CAD, CHFrEF (LVEF 40-45%), pAFib not on ac, DM2, chronic anemia, HTN, HLD, incarcerated ventral hernia (s/p partial SBR in 03/21), cirrhosis (?VELA, recurrent ascites requiring intermittent paracentesis) recent admission 2 weeks back for pneumoperitoneum managed conservatively. During last admission she was also found to have SVT and has been maintained on beta blockers. She was referred here today by her physician for chest pain and tachycardia. she reports chest pain started yesterday with tachycardia, also reports generalized weakness. She is a very poor historian and history is limited. Denies LOC, seizure, focal weakness. she also reports rt sided abdominal pain present for a while. In ED pt was tachycardic found to be in Afib RVR w/ HR in 140s spontaneously converted. Reported fever to 100.7F, sepsis work up in progress, on empiric antibiotics. Patient previously seen by GI in 12/2021 for recurrent nausea, vomiting with EGD done showing non-erosive gastritis. Otherwise normal. Last colonoscopy 07/2015 with polypectomy & hemorrhoids. At this time, Denies nausea, vomiting, abdominal pain, chest pain, shortness of breath, hematemesis, hematochezia, melena. Patient Alert and oriented but does endorse mild confusion and is forgetful. Denies any history of smoking or ETOH use. Reports normal BMs, last one yesterday.         PAST MEDICAL & SURGICAL HISTORY:  DM (diabetes mellitus)      HTN (hypertension)      HLD (hyperlipidemia)      End stage renal disease on dialysis      Pericardial effusion      Pleural effusion      Chronic systolic congestive heart failure      Anemia due to chronic kidney disease, unspecified CKD stage      Paroxysmal atrial fibrillation      Coronary artery disease, angina presence unspecified, unspecified vessel or lesion type, unspecified whether native or transplanted heart      Diabetes      End stage renal disease      Encounter for dialysis catheter care      A-V fistula      A-V fistula          Allergies    eggs (Anaphylaxis)  No Known Drug Allergies    Intolerances        MEDICATIONS  (STANDING):  amLODIPine   Tablet 10 milliGRAM(s) Oral daily  atorvastatin 40 milliGRAM(s) Oral at bedtime  calcium acetate 667 milliGRAM(s) Oral four times a day with meals  dextrose 5%. 1000 milliLiter(s) (50 mL/Hr) IV Continuous <Continuous>  dextrose 5%. 1000 milliLiter(s) (100 mL/Hr) IV Continuous <Continuous>  dextrose 50% Injectable 25 Gram(s) IV Push once  dextrose 50% Injectable 12.5 Gram(s) IV Push once  dextrose 50% Injectable 25 Gram(s) IV Push once  epoetin gian-epbx (RETACRIT) Injectable 55801 Unit(s) IV Push <User Schedule>  glucagon  Injectable 1 milliGRAM(s) IntraMuscular once  hydrALAZINE 25 milliGRAM(s) Oral three times a day  insulin lispro (ADMELOG) corrective regimen sliding scale   SubCutaneous three times a day before meals  levothyroxine 50 MICROGram(s) Oral daily  metoprolol tartrate 50 milliGRAM(s) Oral two times a day  pantoprazole    Tablet 40 milliGRAM(s) Oral before breakfast  piperacillin/tazobactam IVPB.. 3.375 Gram(s) IV Intermittent every 12 hours    MEDICATIONS  (PRN):  acetaminophen     Tablet .. 650 milliGRAM(s) Oral every 6 hours PRN Temp greater or equal to 38C (100.4F), Mild Pain (1 - 3)  dextrose Oral Gel 15 Gram(s) Oral once PRN Blood Glucose LESS THAN 70 milliGRAM(s)/deciliter  ondansetron Injectable 4 milliGRAM(s) IV Push every 8 hours PRN Nausea and/or Vomiting      Social History:    Marital Status:  (   )    (   ) Single    (   )    (  )   Occupation:   Lives with: (  ) alone  (  ) children   (  ) spouse   (  ) parents  (  ) other    Substance Use (street drugs): (  ) never used  (  ) other:  Tobacco Usage:  (   ) never smoked   (   ) former smoker   (   ) current smoker  (     ) pack year  (        ) last cigarette date  Alcohol Usage:  Sexual History:     Family History   IBD (  ) Yes   (  ) No  GI Malignancy (  )  Yes    (  ) No    Health Management     Last Colonoscopy -      (     ) Advanced Directives: (     ) None    (      ) DNR    (     ) DNI    (     ) Health Care Proxy:     Review of Systems:    General:  No wt loss, fevers, chills, night sweats, fatigue,   CV:  No pain, palpitations, hypo/hypertension  Resp:  No dyspnea, cough, tachypnea, wheezing  GI:  See HPI   :  No pain, bleeding, incontinence, nocturia  Muscle:  No pain, weakness  Neuro:  No weakness, tingling, memory problems  Psych:  No fatigue, insomnia, mood problems, depression  Endocrine:  No polyuria, polydypsia, cold/heat intolerance  Heme:  No petechiae, ecchymosis, easy bruisability  Skin:  No rash, tattoos, scars, edema      Vital Signs Last 24 Hrs  T(C): 36.4 (23 May 2022 07:10), Max: 37 (22 May 2022 18:13)  T(F): 97.6 (23 May 2022 07:10), Max: 98.6 (22 May 2022 18:13)  HR: 65 (23 May 2022 07:10) (65 - 71)  BP: 170/75 (23 May 2022 07:10) (157/77 - 170/75)  BP(mean): --  RR: 18 (23 May 2022 07:10) (18 - 18)  SpO2: 95% (23 May 2022 07:10) (95% - 99%)    PHYSICAL EXAM:    Constitutional: Elderly  female, in NAD  Neck: No LAD, supple  Respiratory: CTA and P  Cardiovascular: S1 and S2, RRR, no M  Gastrointestinal: Softly mildly distended, para-umbilical hernia reducible, BS+, NT, neg HSM,  Extremities: No peripheral edema, neg clubbing, cyanosis  Vascular: 2+ peripheral pulses  Neurological: A/O x 3, no focal deficits  Psychiatric: Normal mood, normal affect  Skin: No rashes        LABS:                        9.9    5.35  )-----------( 292      ( 23 May 2022 06:45 )             32.9     05-23    131<L>  |  92<L>  |  27.1<H>  ----------------------------<  201<H>  5.3   |  25.0  |  5.37<H>    Ca    8.9      23 May 2022 06:45  Mg     1.9     05-21    TPro  6.4<L>  /  Alb  2.6<L>  /  TBili  0.5  /  DBili  x   /  AST  14  /  ALT  7   /  AlkPhos  66  05-22    PT/INR - ( 22 May 2022 03:32 )   PT: 13.5 sec;   INR: 1.16 ratio         PTT - ( 21 May 2022 13:47 )  PTT:29.4 sec    LIVER FUNCTIONS - ( 22 May 2022 03:32 )  Alb: 2.6 g/dL / Pro: 6.4 g/dL / ALK PHOS: 66 U/L / ALT: 7 U/L / AST: 14 U/L / GGT: x             RADIOLOGY & ADDITIONAL TESTS:    < from: CT Abdomen and Pelvis No Cont (05.21.22 @ 18:49) >  ACC: [id number] EXAM:  CT ABDOMEN AND PELVIS                        ACC: [id number] EXAM:  CT CHEST                          PROCEDURE DATE:  05/21/2022          INTERPRETATION:  CLINICAL INFORMATION: Fever, chest pain, shortness of   breath, abdominalpain, recent history of hemoperitoneum.    COMPARISON: CT abdomen and pelvis 5/11/2022, CT chest 4/8/2021    CONTRAST/COMPLICATIONS:  IV Contrast: NONE  Oral Contrast: NONE  Complications: None reported at time of study completion    PROCEDURE:  CT of the Chest, Abdomen and Pelvis was performed.  Sagittal and coronal reformats were performed.    FINDINGS:  Limited evaluation of the vasculature and viscera in the absence of   intravenous contrast.    CHEST:  LUNGS AND LARGE AIRWAYS: The central airways are patent. Bronchial wall   thickening in the right lung, most pronounced in the right lower lobe.   Interlobular septal thickening and ground glass opacities within the   right greater than left lungs, suggestive of pulmonary edema. Patchy   areas of focal consolidation within the right lower lobe, which may be   secondary to atelectasis combined with pulmonary edema. A 9 mm nodule   within the right middle lobe is unchanged from 4/8/2021.  PLEURA: Trace right greater than left pleural effusions.  VESSELS: Atherosclerotic calcifications. Coronary artery calcifications.  HEART: Cardiomegaly. No pericardial effusion.  MEDIASTINUM AND YOLY: Enlarged mediastinal lymph nodes, overall similar   to 4/8/2021. For example, a prevascular lymph node measures 1.8 x 1.2 cm   (series 4 image 40), previously 1.8 x 1.1 cm, and an AP window lymph node   measures 2.8 x 1.4 cm (series 4 image 45), previously 2.7 x 1.8 cm.  CHEST WALL AND LOWER NECK: Within normal limits.    ABDOMEN AND PELVIS:  LIVER: Within normal limits.  BILE DUCTS: Cholelithiasis  GALLBLADDER: Within normal limits.  SPLEEN: Within normal limits.  PANCREAS: Within normal limits.  ADRENALS: Within normal limits.  KIDNEYS/URETERS: The kidneys are atrophic. Left renal cyst and bilateral   subcentimeter hypodense foci that are too small to characterize. No   hydronephrosis.    BLADDER: Underdistended.  REPRODUCTIVE ORGANS: Uterus and adnexa within normal limits.    BOWEL: Small bowel anastomosis in the left lower quadrant. No bowel   obstruction. Colonic diverticulosis. Appendix is not visualized.  PERITONEUM: Large amount of free abdominopelvic fluid, unchanged in   volume from 5/11/2022, though with overall mild decreased density in the   interim. Hyperdensity most prominently along the right hemiabdomen is   compatible with blood clot, with some redistribution and subjectively   decreased amount since the prior CT.  VESSELS: Atherosclerotic calcifications.  RETROPERITONEUM/LYMPH NODES: No lymphadenopathy.  ABDOMINALWALL: Anasarca. Ventral hernia containing ascites fluid or   possibly nonobstructed small bowel.  BONES: Degenerative changes. Redemonstrated chronic left L3 and L4   transverse process fractures.    IMPRESSION:  Limited noncontrast exam.    Pulmonary edema and trace bilateral pleural effusions. Patchy   consolidative opacities within the right lower lobe may be secondary to   atelectasis combined with pulmonary edema, though superimposed infection   is not excluded.    Redemonstrated findings ofhemoperitoneum. Large volume of free   abdominopelvic fluid with overall mild decreased density since the prior   CT, suggestive of decreased hemorrhagic content. Blood clot most   prominently along the right hemiabdomen, with some redistribution, which   also appears subjectively decreased in amount in the interim. Limited   evaluation for active hemorrhage in the absence of intravenous contrast.        --- End of Report ---            JESUS ESCALANTE MD; Attending Radiologist  This document has been electronically signed. May 21 2022  8:02PM    < end of copied text >      < from: EGD (12.23.21 @ 00:00) >    Impressions:      Normal mucosa in the whole esophagus.      Erythema and abnormal vascularity in the whole stomach compatible with    non-erosive gastritis.      Normal mucosa in the whole examined duodenum.     Otherwise normal stomach. .         < end of copied text >       Patient is a 62y old  Female who presents with a chief complaint of SVT  PNA  rule out SBP (23 May 2022 09:35)      HPI:  61 yo female with PMH of ESRD on HD (M/W/F), CAD, CHFrEF (LVEF 40-45%), pAFib not on ac, DM2, chronic anemia, HTN, HLD, incarcerated ventral hernia (s/p partial SBR in 03/21),  recurrent ascites requiring intermittent paracentesis recent admission 2 weeks back for pneumoperitoneum managed conservatively. During last admission she was also found to have SVT and has been maintained on beta blockers. She was referred here today by her physician for chest pain and tachycardia. she reports chest pain started yesterday with tachycardia, also reports generalized weakness. She is a very poor historian and history is limited. Denies LOC, seizure, focal weakness. she also reports rt sided abdominal pain present for a while. In ED pt was tachycardic found to be in Afib RVR w/ HR in 140s spontaneously converted. Reported fever to 100.7F, sepsis work up in progress, on empiric antibiotics. Patient previously seen by GI in 12/2021 for recurrent nausea, vomiting with EGD done showing non-erosive gastritis. Otherwise normal. Last colonoscopy 07/2015 with polypectomy & hemorrhoids. At this time, Denies nausea, vomiting, abdominal pain, chest pain, shortness of breath, hematemesis, hematochezia, melena. Patient Alert and oriented but does endorse mild confusion and is forgetful. Denies any history of smoking or ETOH use. Reports normal BMs, last one yesterday.         PAST MEDICAL & SURGICAL HISTORY:  DM (diabetes mellitus)      HTN (hypertension)      HLD (hyperlipidemia)      End stage renal disease on dialysis      Pericardial effusion      Pleural effusion      Chronic systolic congestive heart failure      Anemia due to chronic kidney disease, unspecified CKD stage      Paroxysmal atrial fibrillation      Coronary artery disease, angina presence unspecified, unspecified vessel or lesion type, unspecified whether native or transplanted heart      Diabetes      End stage renal disease      Encounter for dialysis catheter care      A-V fistula      A-V fistula          Allergies    eggs (Anaphylaxis)  No Known Drug Allergies    Intolerances        MEDICATIONS  (STANDING):  amLODIPine   Tablet 10 milliGRAM(s) Oral daily  atorvastatin 40 milliGRAM(s) Oral at bedtime  calcium acetate 667 milliGRAM(s) Oral four times a day with meals  dextrose 5%. 1000 milliLiter(s) (50 mL/Hr) IV Continuous <Continuous>  dextrose 5%. 1000 milliLiter(s) (100 mL/Hr) IV Continuous <Continuous>  dextrose 50% Injectable 25 Gram(s) IV Push once  dextrose 50% Injectable 12.5 Gram(s) IV Push once  dextrose 50% Injectable 25 Gram(s) IV Push once  epoetin gian-epbx (RETACRIT) Injectable 36024 Unit(s) IV Push <User Schedule>  glucagon  Injectable 1 milliGRAM(s) IntraMuscular once  hydrALAZINE 25 milliGRAM(s) Oral three times a day  insulin lispro (ADMELOG) corrective regimen sliding scale   SubCutaneous three times a day before meals  levothyroxine 50 MICROGram(s) Oral daily  metoprolol tartrate 50 milliGRAM(s) Oral two times a day  pantoprazole    Tablet 40 milliGRAM(s) Oral before breakfast  piperacillin/tazobactam IVPB.. 3.375 Gram(s) IV Intermittent every 12 hours    MEDICATIONS  (PRN):  acetaminophen     Tablet .. 650 milliGRAM(s) Oral every 6 hours PRN Temp greater or equal to 38C (100.4F), Mild Pain (1 - 3)  dextrose Oral Gel 15 Gram(s) Oral once PRN Blood Glucose LESS THAN 70 milliGRAM(s)/deciliter  ondansetron Injectable 4 milliGRAM(s) IV Push every 8 hours PRN Nausea and/or Vomiting      Social History:    Marital Status:  (   )    (   ) Single    (   )    (  )   Occupation:   Lives with: (  ) alone  (  ) children   (  ) spouse   (  ) parents  (  ) other    Substance Use (street drugs): (  ) never used  (  ) other:  Tobacco Usage:  (   ) never smoked   (   ) former smoker   (   ) current smoker  (     ) pack year  (        ) last cigarette date  Alcohol Usage:  Sexual History:     Family History   IBD (  ) Yes   (  ) No  GI Malignancy (  )  Yes    (  ) No    Health Management     Last Colonoscopy -      (     ) Advanced Directives: (     ) None    (      ) DNR    (     ) DNI    (     ) Health Care Proxy:     Review of Systems:    General:  No wt loss, fevers, chills, night sweats, fatigue,   CV:  No pain, palpitations, hypo/hypertension  Resp:  No dyspnea, cough, tachypnea, wheezing  GI:  See HPI   :  No pain, bleeding, incontinence, nocturia  Muscle:  No pain, weakness  Neuro:  No weakness, tingling, memory problems  Psych:  No fatigue, insomnia, mood problems, depression  Endocrine:  No polyuria, polydypsia, cold/heat intolerance  Heme:  No petechiae, ecchymosis, easy bruisability  Skin:  No rash, tattoos, scars, edema      Vital Signs Last 24 Hrs  T(C): 36.4 (23 May 2022 07:10), Max: 37 (22 May 2022 18:13)  T(F): 97.6 (23 May 2022 07:10), Max: 98.6 (22 May 2022 18:13)  HR: 65 (23 May 2022 07:10) (65 - 71)  BP: 170/75 (23 May 2022 07:10) (157/77 - 170/75)  BP(mean): --  RR: 18 (23 May 2022 07:10) (18 - 18)  SpO2: 95% (23 May 2022 07:10) (95% - 99%)    PHYSICAL EXAM:    Constitutional: Elderly  female, in NAD  Neck: No LAD, supple  Respiratory: CTA and P  Cardiovascular: S1 and S2, RRR, no M  Gastrointestinal: Softly mildly distended, para-umbilical hernia reducible, BS+, NT, neg HSM,  Extremities: No peripheral edema, neg clubbing, cyanosis  Vascular: 2+ peripheral pulses  Neurological: A/O x 3, no focal deficits  Psychiatric: Normal mood, normal affect  Skin: No rashes        LABS:                        9.9    5.35  )-----------( 292      ( 23 May 2022 06:45 )             32.9     05-23    131<L>  |  92<L>  |  27.1<H>  ----------------------------<  201<H>  5.3   |  25.0  |  5.37<H>    Ca    8.9      23 May 2022 06:45  Mg     1.9     05-21    TPro  6.4<L>  /  Alb  2.6<L>  /  TBili  0.5  /  DBili  x   /  AST  14  /  ALT  7   /  AlkPhos  66  05-22    PT/INR - ( 22 May 2022 03:32 )   PT: 13.5 sec;   INR: 1.16 ratio         PTT - ( 21 May 2022 13:47 )  PTT:29.4 sec    LIVER FUNCTIONS - ( 22 May 2022 03:32 )  Alb: 2.6 g/dL / Pro: 6.4 g/dL / ALK PHOS: 66 U/L / ALT: 7 U/L / AST: 14 U/L / GGT: x             RADIOLOGY & ADDITIONAL TESTS:    < from: CT Abdomen and Pelvis No Cont (05.21.22 @ 18:49) >  ACC: [id number] EXAM:  CT ABDOMEN AND PELVIS                        ACC: [id number] EXAM:  CT CHEST                          PROCEDURE DATE:  05/21/2022          INTERPRETATION:  CLINICAL INFORMATION: Fever, chest pain, shortness of   breath, abdominalpain, recent history of hemoperitoneum.    COMPARISON: CT abdomen and pelvis 5/11/2022, CT chest 4/8/2021    CONTRAST/COMPLICATIONS:  IV Contrast: NONE  Oral Contrast: NONE  Complications: None reported at time of study completion    PROCEDURE:  CT of the Chest, Abdomen and Pelvis was performed.  Sagittal and coronal reformats were performed.    FINDINGS:  Limited evaluation of the vasculature and viscera in the absence of   intravenous contrast.    CHEST:  LUNGS AND LARGE AIRWAYS: The central airways are patent. Bronchial wall   thickening in the right lung, most pronounced in the right lower lobe.   Interlobular septal thickening and ground glass opacities within the   right greater than left lungs, suggestive of pulmonary edema. Patchy   areas of focal consolidation within the right lower lobe, which may be   secondary to atelectasis combined with pulmonary edema. A 9 mm nodule   within the right middle lobe is unchanged from 4/8/2021.  PLEURA: Trace right greater than left pleural effusions.  VESSELS: Atherosclerotic calcifications. Coronary artery calcifications.  HEART: Cardiomegaly. No pericardial effusion.  MEDIASTINUM AND YOLY: Enlarged mediastinal lymph nodes, overall similar   to 4/8/2021. For example, a prevascular lymph node measures 1.8 x 1.2 cm   (series 4 image 40), previously 1.8 x 1.1 cm, and an AP window lymph node   measures 2.8 x 1.4 cm (series 4 image 45), previously 2.7 x 1.8 cm.  CHEST WALL AND LOWER NECK: Within normal limits.    ABDOMEN AND PELVIS:  LIVER: Within normal limits.  BILE DUCTS: Cholelithiasis  GALLBLADDER: Within normal limits.  SPLEEN: Within normal limits.  PANCREAS: Within normal limits.  ADRENALS: Within normal limits.  KIDNEYS/URETERS: The kidneys are atrophic. Left renal cyst and bilateral   subcentimeter hypodense foci that are too small to characterize. No   hydronephrosis.    BLADDER: Underdistended.  REPRODUCTIVE ORGANS: Uterus and adnexa within normal limits.    BOWEL: Small bowel anastomosis in the left lower quadrant. No bowel   obstruction. Colonic diverticulosis. Appendix is not visualized.  PERITONEUM: Large amount of free abdominopelvic fluid, unchanged in   volume from 5/11/2022, though with overall mild decreased density in the   interim. Hyperdensity most prominently along the right hemiabdomen is   compatible with blood clot, with some redistribution and subjectively   decreased amount since the prior CT.  VESSELS: Atherosclerotic calcifications.  RETROPERITONEUM/LYMPH NODES: No lymphadenopathy.  ABDOMINALWALL: Anasarca. Ventral hernia containing ascites fluid or   possibly nonobstructed small bowel.  BONES: Degenerative changes. Redemonstrated chronic left L3 and L4   transverse process fractures.    IMPRESSION:  Limited noncontrast exam.    Pulmonary edema and trace bilateral pleural effusions. Patchy   consolidative opacities within the right lower lobe may be secondary to   atelectasis combined with pulmonary edema, though superimposed infection   is not excluded.    Redemonstrated findings ofhemoperitoneum. Large volume of free   abdominopelvic fluid with overall mild decreased density since the prior   CT, suggestive of decreased hemorrhagic content. Blood clot most   prominently along the right hemiabdomen, with some redistribution, which   also appears subjectively decreased in amount in the interim. Limited   evaluation for active hemorrhage in the absence of intravenous contrast.        --- End of Report ---            JESUS ESCALANTE MD; Attending Radiologist  This document has been electronically signed. May 21 2022  8:02PM    < end of copied text >      < from: EGD (12.23.21 @ 00:00) >    Impressions:      Normal mucosa in the whole esophagus.      Erythema and abnormal vascularity in the whole stomach compatible with    non-erosive gastritis.      Normal mucosa in the whole examined duodenum.     Otherwise normal stomach. .         < end of copied text >

## 2022-05-24 NOTE — PROGRESS NOTE ADULT - SUBJECTIVE AND OBJECTIVE BOX
NEPHROLOGY INTERVAL HPI/OVERNIGHT EVENTS:  pt essentially the same  no overnight adverse events noted    MEDICATIONS  (STANDING):  amLODIPine   Tablet 10 milliGRAM(s) Oral daily  atorvastatin 40 milliGRAM(s) Oral at bedtime  calcium acetate 667 milliGRAM(s) Oral four times a day with meals  chlorhexidine 2% Cloths 1 Application(s) Topical daily  dextrose 5%. 1000 milliLiter(s) (100 mL/Hr) IV Continuous <Continuous>  dextrose 5%. 1000 milliLiter(s) (50 mL/Hr) IV Continuous <Continuous>  dextrose 50% Injectable 25 Gram(s) IV Push once  dextrose 50% Injectable 12.5 Gram(s) IV Push once  dextrose 50% Injectable 25 Gram(s) IV Push once  epoetin gian-epbx (RETACRIT) Injectable 89877 Unit(s) IV Push <User Schedule>  glucagon  Injectable 1 milliGRAM(s) IntraMuscular once  heparin   Injectable 5000 Unit(s) SubCutaneous every 12 hours  hydrALAZINE 25 milliGRAM(s) Oral three times a day  insulin lispro (ADMELOG) corrective regimen sliding scale   SubCutaneous three times a day before meals  levothyroxine 50 MICROGram(s) Oral daily  metoprolol tartrate 50 milliGRAM(s) Oral two times a day  pantoprazole    Tablet 40 milliGRAM(s) Oral before breakfast    MEDICATIONS  (PRN):  acetaminophen     Tablet .. 650 milliGRAM(s) Oral every 6 hours PRN Temp greater or equal to 38C (100.4F), Mild Pain (1 - 3)  dextrose Oral Gel 15 Gram(s) Oral once PRN Blood Glucose LESS THAN 70 milliGRAM(s)/deciliter  ondansetron Injectable 4 milliGRAM(s) IV Push every 8 hours PRN Nausea and/or Vomiting      Allergies    eggs (Anaphylaxis)  No Known Drug Allergies    Intolerances        Vital Signs Last 24 Hrs  T(C): 36.7 (24 May 2022 05:07), Max: 37 (23 May 2022 13:55)  T(F): 98.1 (24 May 2022 05:07), Max: 98.6 (23 May 2022 13:55)  HR: 67 (24 May 2022 05:07) (61 - 67)  BP: 169/77 (24 May 2022 05:07) (152/71 - 169/77)  BP(mean): --  RR: 18 (24 May 2022 05:07) (18 - 18)  SpO2: 94% (24 May 2022 05:07) (91% - 100%)    PHYSICAL EXAM:  GENERAL:  Deconditioned  NECK: Supple, No JVD  NERVOUS SYSTEM:  Awake, alert; non focal  CHEST/LUNG: Clear bilaterally  HEART: Regular rate and rhythm; No rub  ABDOMEN: Soft, Nontender, +BS  EXTREMITIES:  2+ Peripheral Pulses, No dependent edema  SKIN: No rashes or lesions    LABS:                        9.5    5.08  )-----------( 290      ( 24 May 2022 05:44 )             31.5     05-24    133<L>  |  95<L>  |  19.7  ----------------------------<  175<H>  4.7   |  26.0  |  4.08<H>    Ca    8.3<L>      24 May 2022 05:44    TPro  x   /  Alb  x   /  TBili  0.5  /  DBili  x   /  AST  x   /  ALT  x   /  AlkPhos  x   05-24    PT/INR - ( 24 May 2022 05:44 )   PT: 13.5 sec;   INR: 1.16 ratio                 RADIOLOGY & ADDITIONAL TESTS:  < from: CT Abdomen and Pelvis No Cont (05.21.22 @ 18:49) >    ACC: [id number] EXAM:  CT ABDOMEN AND PELVIS                        ACC: [id number] EXAM:  CT CHEST                          PROCEDURE DATE:  05/21/2022          INTERPRETATION:  CLINICAL INFORMATION: Fever, chest pain, shortness of   breath, abdominalpain, recent history of hemoperitoneum.    COMPARISON: CT abdomen and pelvis 5/11/2022, CT chest 4/8/2021    CONTRAST/COMPLICATIONS:  IV Contrast: NONE  Oral Contrast: NONE  Complications: None reported at time of study completion    PROCEDURE:  CT of the Chest, Abdomen and Pelvis was performed.  Sagittal and coronal reformats were performed.    FINDINGS:  Limited evaluation of the vasculature and viscera in the absence of   intravenous contrast.    CHEST:  LUNGS AND LARGE AIRWAYS: The central airways are patent. Bronchial wall   thickening in the right lung, most pronounced in the right lower lobe.   Interlobular septal thickening and ground glass opacities within the   right greater than left lungs, suggestive of pulmonary edema. Patchy   areas of focal consolidation within the right lower lobe, which may be   secondary to atelectasis combined with pulmonary edema. A 9 mm nodule   within the right middle lobe is unchanged from 4/8/2021.  PLEURA: Trace right greater than left pleural effusions.  VESSELS: Atherosclerotic calcifications. Coronary artery calcifications.  HEART: Cardiomegaly. No pericardial effusion.  MEDIASTINUM AND YOLY: Enlarged mediastinal lymph nodes, overall similar   to 4/8/2021. For example, a prevascular lymph node measures 1.8 x 1.2 cm   (series 4 image 40), previously 1.8 x 1.1 cm, and an AP window lymph node   measures 2.8 x 1.4 cm (series 4 image 45), previously 2.7 x 1.8 cm.  CHEST WALL AND LOWER NECK: Within normal limits.    ABDOMEN AND PELVIS:  LIVER: Within normal limits.  BILE DUCTS: Cholelithiasis  GALLBLADDER: Within normal limits.  SPLEEN: Within normal limits.  PANCREAS: Within normal limits.  ADRENALS: Within normal limits.  KIDNEYS/URETERS: The kidneys are atrophic. Left renal cyst and bilateral   subcentimeter hypodense foci that are too small to characterize. No   hydronephrosis.    BLADDER: Underdistended.  REPRODUCTIVE ORGANS: Uterus and adnexa within normal limits.    BOWEL: Small bowel anastomosis in the left lower quadrant. No bowel   obstruction. Colonic diverticulosis. Appendix is not visualized.  PERITONEUM: Large amount of free abdominopelvic fluid, unchanged in   volume from 5/11/2022, though with overall mild decreased density in the   interim. Hyperdensity most prominently along the right hemiabdomen is   compatible with blood clot, with some redistribution and subjectively   decreased amount since the prior CT.  VESSELS: Atherosclerotic calcifications.  RETROPERITONEUM/LYMPH NODES: No lymphadenopathy.  ABDOMINALWALL: Anasarca. Ventral hernia containing ascites fluid or   possibly nonobstructed small bowel.  BONES: Degenerative changes. Redemonstrated chronic left L3 and L4   transverse process fractures.    IMPRESSION:  Limited noncontrast exam.    Pulmonary edema and trace bilateral pleural effusions. Patchy   consolidative opacities within the right lower lobe may be secondary to   atelectasis combined with pulmonary edema, though superimposed infection   is not excluded.    Redemonstrated findings ofhemoperitoneum. Large volume of free   abdominopelvic fluid with overall mild decreased density since the prior   CT, suggestive of decreased hemorrhagic content. Blood clot most   prominently along the right hemiabdomen, with some redistribution, which   also appears subjectively decreased in amount in the interim. Limited   evaluation for active hemorrhage in the absence of intravenous contrast.    < end of copied text >

## 2022-05-24 NOTE — PROGRESS NOTE ADULT - NS ATTEND AMEND GEN_ALL_CORE FT
62 F  renal vs cardiac ascites  liver function intact - outpt hepatology note indicates same  no asterixis  no active GI issues at this time, please reconsult as needed

## 2022-05-24 NOTE — CONSULT NOTE ADULT - SUBJECTIVE AND OBJECTIVE BOX
Patient is a 62y old  Female who presents with a chief complaint of SVT  PNA  rule out SBP (23 May 2022 14:45)      HPI:  ED  VI used for interpretation services.  61 yo female with PMH of ESRD on HD (M/W/F), CAD, CHFrEF (LVEF 40-45%), cirrhosis (?VELA, recurrent ascites requiring intermittent paracentesis) pAFib not on ac, DM2, chronic anemia, HTN, HLD, recent admission on 2 weeks back for pneumoperitoneum managed conservatively. during last admission she was also found to have SVT seen by cardiology. She was referred here today by her physician for chest pain and tachycardia. she reports she chest pain started yesterday with tachycardia, also reports generalized weakness. she is a very poor historian and history is limited. Denies LOC, seizure, focal weakness. she also reports rt sided abdominal pain present present for a while.    In the eD  tachycardic to 140 spontaneously converted  fever to 100.7 (see ED provider note)    ROS: negative except as noted in the HPI   (22 May 2022 01:17)      PAST MEDICAL & SURGICAL HISTORY:  DM (diabetes mellitus)      HTN (hypertension)      HLD (hyperlipidemia)      End stage renal disease on dialysis      Pericardial effusion      Pleural effusion      Chronic systolic congestive heart failure      Anemia due to chronic kidney disease, unspecified CKD stage      Paroxysmal atrial fibrillation      Coronary artery disease, angina presence unspecified, unspecified vessel or lesion type, unspecified whether native or transplanted heart      Diabetes      End stage renal disease      Encounter for dialysis catheter care      A-V fistula      A-V fistula            Allergies    eggs (Anaphylaxis)  No Known Drug Allergies          MEDICATIONS  (STANDING):  amLODIPine   Tablet 10 milliGRAM(s) Oral daily  atorvastatin 40 milliGRAM(s) Oral at bedtime  calcium acetate 667 milliGRAM(s) Oral four times a day with meals  chlorhexidine 2% Cloths 1 Application(s) Topical daily  dextrose 5%. 1000 milliLiter(s) (100 mL/Hr) IV Continuous <Continuous>  dextrose 5%. 1000 milliLiter(s) (50 mL/Hr) IV Continuous <Continuous>  dextrose 50% Injectable 25 Gram(s) IV Push once  dextrose 50% Injectable 12.5 Gram(s) IV Push once  dextrose 50% Injectable 25 Gram(s) IV Push once  epoetin gian-epbx (RETACRIT) Injectable 38046 Unit(s) IV Push <User Schedule>  glucagon  Injectable 1 milliGRAM(s) IntraMuscular once  heparin   Injectable 5000 Unit(s) SubCutaneous every 12 hours  hydrALAZINE 25 milliGRAM(s) Oral three times a day  insulin lispro (ADMELOG) corrective regimen sliding scale   SubCutaneous three times a day before meals  levothyroxine 50 MICROGram(s) Oral daily  metoprolol tartrate 50 milliGRAM(s) Oral two times a day  pantoprazole    Tablet 40 milliGRAM(s) Oral before breakfast  piperacillin/tazobactam IVPB.. 3.375 Gram(s) IV Intermittent every 12 hours    MEDICATIONS  (PRN):  acetaminophen     Tablet .. 650 milliGRAM(s) Oral every 6 hours PRN Temp greater or equal to 38C (100.4F), Mild Pain (1 - 3)  dextrose Oral Gel 15 Gram(s) Oral once PRN Blood Glucose LESS THAN 70 milliGRAM(s)/deciliter  ondansetron Injectable 4 milliGRAM(s) IV Push every 8 hours PRN Nausea and/or Vomiting      FAMILY HISTORY:  Family history of kidney disease in brother (Sibling)        SOCIAL HISTORY:    CIGARETTES: denies    ALCOHOL: denies    REVIEW OF SYSTEMS:  CONSTITUTIONAL: No fever, weight loss, or fatigue  EYES: No eye pain, visual disturbances, or discharge  ENMT:  No difficulty hearing, tinnitus, vertigo; No sinus or throat pain  NECK: No pain or stiffness  RESPIRATORY: as in HPI  CARDIOVASCULAR: as in HPI  GASTROINTESTINAL: as in HPI  NEUROLOGICAL: No headaches, memory loss, loss of strength, numbness, or tremors  SKIN: No itching, burning, rashes, or lesions   LYMPH Nodes: No enlarged glands  ENDOCRINE: No heat or cold intolerance; No hair loss  MUSCULOSKELETAL: No joint pain or swelling; No muscle, back, or extremity pain  PSYCHIATRIC: No depression, anxiety, mood swings, or difficulty sleeping  HEME/LYMPH: No easy bruising, or bleeding gums  ALLERY AND IMMUNOLOGIC: No hives or eczema	    Vital Signs Last 24 Hrs  T(C): 36.7 (24 May 2022 05:07), Max: 37 (23 May 2022 13:55)  T(F): 98.1 (24 May 2022 05:07), Max: 98.6 (23 May 2022 13:55)  HR: 67 (24 May 2022 05:07) (61 - 67)  BP: 169/77 (24 May 2022 05:07) (152/71 - 169/77)  RR: 18 (24 May 2022 05:07) (18 - 18)  SpO2: 94% (24 May 2022 05:07) (91% - 100%)    Daily     Daily     I&O's Detail    23 May 2022 07:01  -  24 May 2022 07:00  --------------------------------------------------------  IN:  Total IN: 0 mL    OUT:    Other (mL): 1000 mL  Total OUT: 1000 mL    Total NET: -1000 mL          PHYSICAL EXAM:  Appearance: No distress  HEENT:   Normal oral mucosa, PERRL, EOMI, sclera non-icteric	  Lymphatic: No cervical lymphadenopathy  Cardiovascular: Normal S1 S2, No JVD, No cardiac murmurs, No carotid bruits, No peripheral edema  Respiratory: Lungs clear to auscultation	  Psychiatry: A & O x 3, Mood & affect appropriate  Gastrointestinal:  Soft, Non-tender, + BS, no bruits	  Skin: No rashes, No ecchymoses, No cyanosis  Neurologic: Grossly non-focal with full strength in all four extremities  Extremities: Normal range of motion, No clubbing, cyanosis or edema  Vascular: Peripheral pulses palpable 2+ bilaterally      INTERPRETATION OF TELEMETRY: SR    ECG: SVT    LABS:                        9.5    5.08  )-----------( 290      ( 24 May 2022 05:44 )             31.5     05-24    133<L>  |  95<L>  |  19.7  ----------------------------<  175<H>  4.7   |  26.0  |  4.08<H>    Ca    8.3<L>      24 May 2022 05:44    TPro  x   /  Alb  x   /  TBili  0.5  /  DBili  x   /  AST  x   /  ALT  x   /  AlkPhos  x   05-24        PT/INR - ( 24 May 2022 05:44 )   PT: 13.5 sec;   INR: 1.16 ratio             I&O's Summary    23 May 2022 07:01  -  24 May 2022 07:00  --------------------------------------------------------  IN: 0 mL / OUT: 1000 mL / NET: -1000 mL      BNPSerum Pro-Brain Natriuretic Peptide: >57400 pg/mL (05-23 @ 14:12)    RADIOLOGY & ADDITIONAL STUDIES:

## 2022-05-24 NOTE — PROGRESS NOTE ADULT - SUBJECTIVE AND OBJECTIVE BOX
JAROCHO MORALES    [id number]    62y      Female     by RN     CC: Admitted with fever, sob.    poor historian  feels better, offers no complaints   had some palpitations on admission however this has resolved now.     INTERVAL HPI/OVERNIGHT EVENTS: no acute events     REVIEW OF SYSTEMS:    CONSTITUTIONAL: No fever, fatigue  RESPIRATORY: No cough, wheezing,  No shortness of breath  CARDIOVASCULAR: No chest pain, palpitations  GASTROINTESTINAL: No abdominal or epigastric pain. No nausea, vomiting        Vital Signs Last 24 Hrs  T(C): 36.7 (24 May 2022 05:07), Max: 37 (23 May 2022 13:55)  T(F): 98.1 (24 May 2022 05:07), Max: 98.6 (23 May 2022 13:55)  HR: 67 (24 May 2022 05:07) (61 - 67)  BP: 169/77 (24 May 2022 05:07) (152/71 - 169/77)  BP(mean): --  RR: 18 (24 May 2022 05:07) (18 - 18)  SpO2: 94% (24 May 2022 05:07) (91% - 100%)      PHYSICAL EXAM:    GENERAL: NAD, non-toxic appearing however chronically ill appearing   HEENT: PERRL, +EOMI  NECK: soft, Supple, No JVD  CHEST/LUNG: Clear to percussion bilaterally; No wheezing  HEART: S1S2+, Regular rate and rhythm; No murmur  ABDOMEN: Soft, Nontender, distended; paraumbilical hernia, soft reducible, bowel sounds present  EXTREMITIES:  No clubbing, cyanosis, or edema  SKIN: No rashes or lesions  NEURO: AAOX3        LABS:                                       9.5    5.08  )-----------( 290      ( 24 May 2022 05:44 )             31.5   05-24    133<L>  |  95<L>  |  19.7  ----------------------------<  175<H>  4.7   |  26.0  |  4.08<H>    Ca    8.3<L>      24 May 2022 05:44    TPro  x   /  Alb  x   /  TBili  0.5  /  DBili  x   /  AST  x   /  ALT  x   /  AlkPhos  x   05-24            MEDICATIONS  (STANDING):  amLODIPine   Tablet 10 milliGRAM(s) Oral daily  atorvastatin 40 milliGRAM(s) Oral at bedtime  calcium acetate 667 milliGRAM(s) Oral four times a day with meals  chlorhexidine 2% Cloths 1 Application(s) Topical daily  dextrose 5%. 1000 milliLiter(s) (100 mL/Hr) IV Continuous <Continuous>  dextrose 5%. 1000 milliLiter(s) (50 mL/Hr) IV Continuous <Continuous>  dextrose 50% Injectable 25 Gram(s) IV Push once  dextrose 50% Injectable 12.5 Gram(s) IV Push once  dextrose 50% Injectable 25 Gram(s) IV Push once  epoetin gian-epbx (RETACRIT) Injectable 56279 Unit(s) IV Push <User Schedule>  glucagon  Injectable 1 milliGRAM(s) IntraMuscular once  heparin   Injectable 5000 Unit(s) SubCutaneous every 12 hours  hydrALAZINE 25 milliGRAM(s) Oral three times a day  insulin lispro (ADMELOG) corrective regimen sliding scale   SubCutaneous three times a day before meals  levothyroxine 50 MICROGram(s) Oral daily  metoprolol tartrate 50 milliGRAM(s) Oral two times a day  pantoprazole    Tablet 40 milliGRAM(s) Oral before breakfast    MEDICATIONS  (PRN):  acetaminophen     Tablet .. 650 milliGRAM(s) Oral every 6 hours PRN Temp greater or equal to 38C (100.4F), Mild Pain (1 - 3)  dextrose Oral Gel 15 Gram(s) Oral once PRN Blood Glucose LESS THAN 70 milliGRAM(s)/deciliter  ondansetron Injectable 4 milliGRAM(s) IV Push every 8 hours PRN Nausea and/or Vomiting        RADIOLOGY & ADDITIONAL TESTS:

## 2022-05-24 NOTE — PROGRESS NOTE ADULT - SUBJECTIVE AND OBJECTIVE BOX
Chief Complaint:  Patient is a 62y old  Female who presents with a chief complaint of SVT  PNA  rule out SBP.       Interval Events / Subjective: Patient seen and evaluated at bedside, reporting no complaints, no overnight events. Denies nausea, vomiting, abdominal pain, chest pain, shortness of breath, hematemesis, hematochezia, melena. Tolerating diet. Reports BM this morning.       REVIEW OF SYSTEMS:   General: Negative  HEENT: Negative  CV: Negative  Respiratory: Negative  GI: See HPI  : Negative  MSK: Negative  Hematologic: Negative  Skin: Negative    MEDICATIONS:   MEDICATIONS  (STANDING):  amLODIPine   Tablet 10 milliGRAM(s) Oral daily  atorvastatin 40 milliGRAM(s) Oral at bedtime  calcium acetate 667 milliGRAM(s) Oral four times a day with meals  chlorhexidine 2% Cloths 1 Application(s) Topical daily  dextrose 5%. 1000 milliLiter(s) (100 mL/Hr) IV Continuous <Continuous>  dextrose 5%. 1000 milliLiter(s) (50 mL/Hr) IV Continuous <Continuous>  dextrose 50% Injectable 25 Gram(s) IV Push once  dextrose 50% Injectable 12.5 Gram(s) IV Push once  dextrose 50% Injectable 25 Gram(s) IV Push once  epoetin gian-epbx (RETACRIT) Injectable 85312 Unit(s) IV Push <User Schedule>  glucagon  Injectable 1 milliGRAM(s) IntraMuscular once  heparin   Injectable 5000 Unit(s) SubCutaneous every 12 hours  hydrALAZINE 25 milliGRAM(s) Oral three times a day  insulin lispro (ADMELOG) corrective regimen sliding scale   SubCutaneous three times a day before meals  levothyroxine 50 MICROGram(s) Oral daily  metoprolol tartrate 50 milliGRAM(s) Oral two times a day  pantoprazole    Tablet 40 milliGRAM(s) Oral before breakfast    MEDICATIONS  (PRN):  acetaminophen     Tablet .. 650 milliGRAM(s) Oral every 6 hours PRN Temp greater or equal to 38C (100.4F), Mild Pain (1 - 3)  dextrose Oral Gel 15 Gram(s) Oral once PRN Blood Glucose LESS THAN 70 milliGRAM(s)/deciliter  ondansetron Injectable 4 milliGRAM(s) IV Push every 8 hours PRN Nausea and/or Vomiting      ALLERGIES:   Allergies    eggs (Anaphylaxis)  No Known Drug Allergies    Intolerances        VITAL SIGNS:   Vital Signs Last 24 Hrs  T(C): 36.7 (24 May 2022 05:07), Max: 37 (23 May 2022 13:55)  T(F): 98.1 (24 May 2022 05:07), Max: 98.6 (23 May 2022 13:55)  HR: 67 (24 May 2022 05:07) (61 - 67)  BP: 169/77 (24 May 2022 05:07) (152/71 - 169/77)  BP(mean): --  RR: 18 (24 May 2022 05:07) (18 - 18)  SpO2: 94% (24 May 2022 05:07) (91% - 100%)  I&O's Summary    23 May 2022 07:01  -  24 May 2022 07:00  --------------------------------------------------------  IN: 0 mL / OUT: 1000 mL / NET: -1000 mL        Constitutional: Elderly  female, in NAD  Neck: No LAD, supple  Respiratory: CTA and P  Cardiovascular: S1 and S2, RRR, no M  Gastrointestinal: Softly mildly distended, para-umbilical hernia reducible, BS+, NT, neg HSM,  Extremities: No peripheral edema, neg clubbing, cyanosis  Vascular: 2+ peripheral pulses  Neurological: A/O x 3, no focal deficits  Psychiatric: Normal mood, normal affect  Skin: No rashes      LABS:  CBC Full  -  ( 24 May 2022 05:44 )  WBC Count : 5.08 K/uL  RBC Count : 3.41 M/uL  Hemoglobin : 9.5 g/dL  Hematocrit : 31.5 %  Platelet Count - Automated : 290 K/uL  Mean Cell Volume : 92.4 fl  Mean Cell Hemoglobin : 27.9 pg  Mean Cell Hemoglobin Concentration : 30.2 gm/dL  Auto Neutrophil # : x  Auto Lymphocyte # : x  Auto Monocyte # : x  Auto Eosinophil # : x  Auto Basophil # : x  Auto Neutrophil % : x  Auto Lymphocyte % : x  Auto Monocyte % : x  Auto Eosinophil % : x  Auto Basophil % : x    05-24    133<L>  |  95<L>  |  19.7  ----------------------------<  175<H>  4.7   |  26.0  |  4.08<H>    Ca    8.3<L>      24 May 2022 05:44    TPro  x   /  Alb  x   /  TBili  0.5  /  DBili  x   /  AST  x   /  ALT  x   /  AlkPhos  x   05-24      PT/INR - ( 24 May 2022 05:44 )   PT: 13.5 sec;   INR: 1.16 ratio               Culture - Blood (collected 21 May 2022 13:47)  Source: .Blood Blood-Peripheral  Preliminary Report (23 May 2022 15:00):    No growth at 48 hours    Culture - Blood (collected 21 May 2022 13:47)  Source: .Blood Blood-Peripheral  Preliminary Report (23 May 2022 15:00):    No growth at 48 hours        RADIOLOGY & ADDITIONAL STUDIES (The following images were personally reviewed):    < from: CT Abdomen and Pelvis No Cont (05.21.22 @ 18:49) >  ACC: [id number] EXAM:  CT ABDOMEN AND PELVIS                        ACC: [id number] EXAM:  CT CHEST                          PROCEDURE DATE:  05/21/2022          INTERPRETATION:  CLINICAL INFORMATION: Fever, chest pain, shortness of   breath, abdominalpain, recent history of hemoperitoneum.    COMPARISON: CT abdomen and pelvis 5/11/2022, CT chest 4/8/2021    CONTRAST/COMPLICATIONS:  IV Contrast: NONE  Oral Contrast: NONE  Complications: None reported at time of study completion    PROCEDURE:  CT of the Chest, Abdomen and Pelvis was performed.  Sagittal and coronal reformats were performed.    FINDINGS:  Limited evaluation of the vasculature and viscera in the absence of   intravenous contrast.    CHEST:  LUNGS AND LARGE AIRWAYS: The central airways are patent. Bronchial wall   thickening in the right lung, most pronounced in the right lower lobe.   Interlobular septal thickening and ground glass opacities within the   right greater than left lungs, suggestive of pulmonary edema. Patchy   areas of focal consolidation within the right lower lobe, which may be   secondary to atelectasis combined with pulmonary edema. A 9 mm nodule   within the right middle lobe is unchanged from 4/8/2021.  PLEURA: Trace right greater than left pleural effusions.  VESSELS: Atherosclerotic calcifications. Coronary artery calcifications.  HEART: Cardiomegaly. No pericardial effusion.  MEDIASTINUM AND YOLY: Enlarged mediastinal lymph nodes, overall similar   to 4/8/2021. For example, a prevascular lymph node measures 1.8 x 1.2 cm   (series 4 image 40), previously 1.8 x 1.1 cm, and an AP window lymph node   measures 2.8 x 1.4 cm (series 4 image 45), previously 2.7 x 1.8 cm.  CHEST WALL AND LOWER NECK: Within normal limits.    ABDOMEN AND PELVIS:  LIVER: Within normal limits.  BILE DUCTS: Cholelithiasis  GALLBLADDER: Within normal limits.  SPLEEN: Within normal limits.  PANCREAS: Within normal limits.  ADRENALS: Within normal limits.  KIDNEYS/URETERS: The kidneys are atrophic. Left renal cyst and bilateral   subcentimeter hypodense foci that are too small to characterize. No   hydronephrosis.    BLADDER: Underdistended.  REPRODUCTIVE ORGANS: Uterus and adnexa within normal limits.    BOWEL: Small bowel anastomosis in the left lower quadrant. No bowel   obstruction. Colonic diverticulosis. Appendix is not visualized.  PERITONEUM: Large amount of free abdominopelvic fluid, unchanged in   volume from 5/11/2022, though with overall mild decreased density in the   interim. Hyperdensity most prominently along the right hemiabdomen is   compatible with blood clot, with some redistribution and subjectively   decreased amount since the prior CT.  VESSELS: Atherosclerotic calcifications.  RETROPERITONEUM/LYMPH NODES: No lymphadenopathy.  ABDOMINALWALL: Anasarca. Ventral hernia containing ascites fluid or   possibly nonobstructed small bowel.  BONES: Degenerative changes. Redemonstrated chronic left L3 and L4   transverse process fractures.    IMPRESSION:  Limited noncontrast exam.    Pulmonary edema and trace bilateral pleural effusions. Patchy   consolidative opacities within the right lower lobe may be secondary to   atelectasis combined with pulmonary edema, though superimposed infection   is not excluded.    Redemonstrated findings ofhemoperitoneum. Large volume of free   abdominopelvic fluid with overall mild decreased density since the prior   CT, suggestive of decreased hemorrhagic content. Blood clot most   prominently along the right hemiabdomen, with some redistribution, which   also appears subjectively decreased in amount in the interim. Limited   evaluation for active hemorrhage in the absence of intravenous contrast.        --- End of Report ---            JESUS ESCALANTE MD; Attending Radiologist  This document has been electronically signed. May 21 2022  8:02PM    < end of copied text >      < from: EGD (12.23.21 @ 00:00) >    Impressions:      Normal mucosa in the whole esophagus.      Erythema and abnormal vascularity in the whole stomach compatible with    non-erosive gastritis.      Normal mucosa in the whole examined duodenum.     Otherwise normal stomach. .         < end of copied text >           Chief Complaint:  Patient is a 62y old  Female who presents with a chief complaint of SVT  PNA  rule out SBP.       Interval Events / Subjective: Patient seen and evaluated at bedside, reporting no complaints, no overnight events. Denies nausea, vomiting, abdominal pain, chest pain, shortness of breath, hematemesis, hematochezia, melena. Tolerating diet. Reports BM this morning.       REVIEW OF SYSTEMS:   General: Negative  HEENT: Negative  CV: Negative  Respiratory: Negative  GI: See HPI  : Negative  MSK: Negative  Hematologic: Negative  Skin: Negative    MEDICATIONS:   MEDICATIONS  (STANDING):  amLODIPine   Tablet 10 milliGRAM(s) Oral daily  atorvastatin 40 milliGRAM(s) Oral at bedtime  calcium acetate 667 milliGRAM(s) Oral four times a day with meals  chlorhexidine 2% Cloths 1 Application(s) Topical daily  dextrose 5%. 1000 milliLiter(s) (100 mL/Hr) IV Continuous <Continuous>  dextrose 5%. 1000 milliLiter(s) (50 mL/Hr) IV Continuous <Continuous>  dextrose 50% Injectable 25 Gram(s) IV Push once  dextrose 50% Injectable 12.5 Gram(s) IV Push once  dextrose 50% Injectable 25 Gram(s) IV Push once  epoetin gian-epbx (RETACRIT) Injectable 53270 Unit(s) IV Push <User Schedule>  glucagon  Injectable 1 milliGRAM(s) IntraMuscular once  heparin   Injectable 5000 Unit(s) SubCutaneous every 12 hours  hydrALAZINE 25 milliGRAM(s) Oral three times a day  insulin lispro (ADMELOG) corrective regimen sliding scale   SubCutaneous three times a day before meals  levothyroxine 50 MICROGram(s) Oral daily  metoprolol tartrate 50 milliGRAM(s) Oral two times a day  pantoprazole    Tablet 40 milliGRAM(s) Oral before breakfast    MEDICATIONS  (PRN):  acetaminophen     Tablet .. 650 milliGRAM(s) Oral every 6 hours PRN Temp greater or equal to 38C (100.4F), Mild Pain (1 - 3)  dextrose Oral Gel 15 Gram(s) Oral once PRN Blood Glucose LESS THAN 70 milliGRAM(s)/deciliter  ondansetron Injectable 4 milliGRAM(s) IV Push every 8 hours PRN Nausea and/or Vomiting      ALLERGIES:   Allergies    eggs (Anaphylaxis)  No Known Drug Allergies    Intolerances        VITAL SIGNS:   Vital Signs Last 24 Hrs  T(C): 36.7 (24 May 2022 05:07), Max: 37 (23 May 2022 13:55)  T(F): 98.1 (24 May 2022 05:07), Max: 98.6 (23 May 2022 13:55)  HR: 67 (24 May 2022 05:07) (61 - 67)  BP: 169/77 (24 May 2022 05:07) (152/71 - 169/77)  BP(mean): --  RR: 18 (24 May 2022 05:07) (18 - 18)  SpO2: 94% (24 May 2022 05:07) (91% - 100%)  I&O's Summary    23 May 2022 07:01  -  24 May 2022 07:00  --------------------------------------------------------  IN: 0 mL / OUT: 1000 mL / NET: -1000 mL        Constitutional: Elderly  female, in NAD  Neck: No LAD, supple  Respiratory: CTA and P  Cardiovascular: S1 and S2, RRR, no M  Gastrointestinal: Softly mildly distended, para-umbilical hernia reducible, BS+, NT, neg HSM,  Extremities: No peripheral edema, neg clubbing, cyanosis  Vascular: 2+ peripheral pulses  Neurological: A/O x 3, no focal deficits  Psychiatric: Normal mood, normal affect  Skin: No rashes      LABS:  CBC Full  -  ( 24 May 2022 05:44 )  WBC Count : 5.08 K/uL  RBC Count : 3.41 M/uL  Hemoglobin : 9.5 g/dL  Hematocrit : 31.5 %  Platelet Count - Automated : 290 K/uL  Mean Cell Volume : 92.4 fl  Mean Cell Hemoglobin : 27.9 pg  Mean Cell Hemoglobin Concentration : 30.2 gm/dL  Auto Neutrophil # : x  Auto Lymphocyte # : x  Auto Monocyte # : x  Auto Eosinophil # : x  Auto Basophil # : x  Auto Neutrophil % : x  Auto Lymphocyte % : x  Auto Monocyte % : x  Auto Eosinophil % : x  Auto Basophil % : x    05-24    133<L>  |  95<L>  |  19.7  ----------------------------<  175<H>  4.7   |  26.0  |  4.08<H>    Ca    8.3<L>      24 May 2022 05:44    TPro  x   /  Alb  x   /  TBili  0.5  /  DBili  x   /  AST  x   /  ALT  x   /  AlkPhos  x   05-24      PT/INR - ( 24 May 2022 05:44 )   PT: 13.5 sec;   INR: 1.16 ratio               Culture - Blood (collected 21 May 2022 13:47)  Source: .Blood Blood-Peripheral  Preliminary Report (23 May 2022 15:00):    No growth at 48 hours    Culture - Blood (collected 21 May 2022 13:47)  Source: .Blood Blood-Peripheral  Preliminary Report (23 May 2022 15:00):    No growth at 48 hours        RADIOLOGY & ADDITIONAL STUDIES (The following images were personally reviewed):    < from: CT Abdomen and Pelvis No Cont (05.21.22 @ 18:49) >  ACC: [id number] EXAM:  CT ABDOMEN AND PELVIS                        ACC: [id number] EXAM:  CT CHEST                          PROCEDURE DATE:  05/21/2022          INTERPRETATION:  CLINICAL INFORMATION: Fever, chest pain, shortness of   breath, abdominalpain, recent history of hemoperitoneum.    COMPARISON: CT abdomen and pelvis 5/11/2022, CT chest 4/8/2021    CONTRAST/COMPLICATIONS:  IV Contrast: NONE  Oral Contrast: NONE  Complications: None reported at time of study completion    PROCEDURE:  CT of the Chest, Abdomen and Pelvis was performed.  Sagittal and coronal reformats were performed.    FINDINGS:  Limited evaluation of the vasculature and viscera in the absence of   intravenous contrast.    CHEST:  LUNGS AND LARGE AIRWAYS: The central airways are patent. Bronchial wall   thickening in the right lung, most pronounced in the right lower lobe.   Interlobular septal thickening and ground glass opacities within the   right greater than left lungs, suggestive of pulmonary edema. Patchy   areas of focal consolidation within the right lower lobe, which may be   secondary to atelectasis combined with pulmonary edema. A 9 mm nodule   within the right middle lobe is unchanged from 4/8/2021.  PLEURA: Trace right greater than left pleural effusions.  VESSELS: Atherosclerotic calcifications. Coronary artery calcifications.  HEART: Cardiomegaly. No pericardial effusion.  MEDIASTINUM AND YOLY: Enlarged mediastinal lymph nodes, overall similar   to 4/8/2021. For example, a prevascular lymph node measures 1.8 x 1.2 cm   (series 4 image 40), previously 1.8 x 1.1 cm, and an AP window lymph node   measures 2.8 x 1.4 cm (series 4 image 45), previously 2.7 x 1.8 cm.  CHEST WALL AND LOWER NECK: Within normal limits.    ABDOMEN AND PELVIS:  LIVER: Within normal limits.  BILE DUCTS: Cholelithiasis  GALLBLADDER: Within normal limits.  SPLEEN: Within normal limits.  PANCREAS: Within normal limits.  ADRENALS: Within normal limits.  KIDNEYS/URETERS: The kidneys are atrophic. Left renal cyst and bilateral   subcentimeter hypodense foci that are too small to characterize. No   hydronephrosis.    BLADDER: Underdistended.  REPRODUCTIVE ORGANS: Uterus and adnexa within normal limits.    BOWEL: Small bowel anastomosis in the left lower quadrant. No bowel   obstruction. Colonic diverticulosis. Appendix is not visualized.  PERITONEUM: Large amount of free abdominopelvic fluid, unchanged in   volume from 5/11/2022, though with overall mild decreased density in the   interim. Hyperdensity most prominently along the right hemiabdomen is   compatible with blood clot, with some redistribution and subjectively   decreased amount since the prior CT.  VESSELS: Atherosclerotic calcifications.  RETROPERITONEUM/LYMPH NODES: No lymphadenopathy.  ABDOMINALWALL: Anasarca. Ventral hernia containing ascites fluid or   possibly nonobstructed small bowel.  BONES: Degenerative changes. Redemonstrated chronic left L3 and L4   transverse process fractures.    IMPRESSION:  Limited noncontrast exam.    Pulmonary edema and trace bilateral pleural effusions. Patchy   consolidative opacities within the right lower lobe may be secondary to   atelectasis combined with pulmonary edema, though superimposed infection   is not excluded.    Redemonstrated findings ofhemoperitoneum. Large volume of free   abdominopelvic fluid with overall mild decreased density since the prior   CT, suggestive of decreased hemorrhagic content. Blood clot most   prominently along the right hemiabdomen, with some redistribution, which   also appears subjectively decreased in amount in the interim. Limited   evaluation for active hemorrhage in the absence of intravenous contrast.        --- End of Report ---            JESUS ESCALANTE MD; Attending Radiologist  This document has been electronically signed. May 21 2022  8:02PM    < end of copied text >      < from: EGD (12.23.21 @ 00:00) >    Impressions:      Normal mucosa in the whole esophagus.      Erythema and abnormal vascularity in the whole stomach compatible with    non-erosive gastritis.      Normal mucosa in the whole examined duodenum.     Otherwise normal stomach. .         < end of copied text >

## 2022-05-24 NOTE — CONSULT NOTE ADULT - ASSESSMENT
63 yo F w/ h/o mild NICM (LVEF 40-45% by TTE in 02/2021; 2019: negative LHC), h/o PAF (2017 [facility]: brief inpatient episode likely secondary to medical procedure; MCOT 28-day in 03/2021: 0 episodes of AF, therefore A/C discontinued), ESRD, cirrhosis (~VELA; Q 2week paracentesis regimen), DM, HTN, HLD who presented to [facility] ED on 05/10 with abdominal pain/hemoperitoneum/peritoneal hematoma in the setting of IR large volume paracentesis that same day and had SVT on 05/11 in MICU that broke with adenosine and patient re-presented on 05/22 for tachycardia in 140's secondary to possible SVT (vs. AF) that broke spontaneously.     1. SVT  - terminated with adenosine. No recurrence over past 24 hrs. likely triggered by acute illness  2. PAF - remote history with no known recurrence since 2017    PLAN  Continue metoprolol ER 50mg BID  Outpatient follow up with me in 1-2 weeks.

## 2022-05-25 NOTE — PROGRESS NOTE ADULT - SUBJECTIVE AND OBJECTIVE BOX
Patient reports no recurrence of palpitations    MEDICATIONS  (STANDING):  amLODIPine   Tablet 10 milliGRAM(s) Oral daily  atorvastatin 40 milliGRAM(s) Oral at bedtime  calcium acetate 667 milliGRAM(s) Oral four times a day with meals  chlorhexidine 2% Cloths 1 Application(s) Topical daily  dextrose 5%. 1000 milliLiter(s) (100 mL/Hr) IV Continuous <Continuous>  dextrose 5%. 1000 milliLiter(s) (50 mL/Hr) IV Continuous <Continuous>  dextrose 50% Injectable 25 Gram(s) IV Push once  dextrose 50% Injectable 12.5 Gram(s) IV Push once  dextrose 50% Injectable 25 Gram(s) IV Push once  epoetin gian-epbx (RETACRIT) Injectable 40223 Unit(s) IV Push <User Schedule>  glucagon  Injectable 1 milliGRAM(s) IntraMuscular once  heparin   Injectable 5000 Unit(s) SubCutaneous every 12 hours  hydrALAZINE 25 milliGRAM(s) Oral three times a day  insulin lispro (ADMELOG) corrective regimen sliding scale   SubCutaneous three times a day before meals  levothyroxine 50 MICROGram(s) Oral daily  metoprolol tartrate 50 milliGRAM(s) Oral two times a day  pantoprazole    Tablet 40 milliGRAM(s) Oral before breakfast    ICU Vital Signs Last 24 Hrs  T(C): 36.3 (25 May 2022 04:56), Max: 36.7 (24 May 2022 12:00)  T(F): 97.3 (25 May 2022 04:56), Max: 98.1 (24 May 2022 15:55)  HR: 67 (25 May 2022 04:56) (65 - 68)  BP: 173/93 (25 May 2022 04:56) (137/84 - 173/93)  RR: 20 (25 May 2022 04:56) (18 - 20)  SpO2: 99% (25 May 2022 04:56) (96% - 99%)    Gen: no acute distress  CV: regular, normal S1 and S2  Resp: Lungs CTA  Neuro: no focal deficits    05-25    131<L>  |  93<L>  |  33.5<H>  ----------------------------<  219<H>  6.0<H>   |  26.0  |  5.04<H>    Ca    9.0      25 May 2022 06:03    TPro  x   /  Alb  x   /  TBili  0.5  /  DBili  x   /  AST  x   /  ALT  x   /  AlkPhos  x   05-24                          9.3    5.43  )-----------( 299      ( 25 May 2022 06:03 )             31.3

## 2022-05-25 NOTE — DISCHARGE NOTE NURSING/CASE MANAGEMENT/SOCIAL WORK - NSDCPEFALRISK_GEN_ALL_CORE
For information on Fall & Injury Prevention, visit: [email or web address] OR  [email or web address] OR  https://www.cdc.gov/steadi/patient.html

## 2022-05-25 NOTE — PROGRESS NOTE ADULT - SUBJECTIVE AND OBJECTIVE BOX
NEPHROLOGY INTERVAL HPI/OVERNIGHT EVENTS:    No new events.    MEDICATIONS  (STANDING):  amLODIPine   Tablet 10 milliGRAM(s) Oral daily  atorvastatin 40 milliGRAM(s) Oral at bedtime  calcium acetate 667 milliGRAM(s) Oral four times a day with meals  chlorhexidine 2% Cloths 1 Application(s) Topical daily  dextrose 5%. 1000 milliLiter(s) (100 mL/Hr) IV Continuous <Continuous>  dextrose 5%. 1000 milliLiter(s) (50 mL/Hr) IV Continuous <Continuous>  dextrose 50% Injectable 25 Gram(s) IV Push once  dextrose 50% Injectable 12.5 Gram(s) IV Push once  dextrose 50% Injectable 25 Gram(s) IV Push once  epoetin gian-epbx (RETACRIT) Injectable 53973 Unit(s) IV Push <User Schedule>  glucagon  Injectable 1 milliGRAM(s) IntraMuscular once  heparin   Injectable 5000 Unit(s) SubCutaneous every 12 hours  hydrALAZINE 25 milliGRAM(s) Oral three times a day  insulin lispro (ADMELOG) corrective regimen sliding scale   SubCutaneous three times a day before meals  levothyroxine 50 MICROGram(s) Oral daily  metoprolol tartrate 50 milliGRAM(s) Oral two times a day  pantoprazole    Tablet 40 milliGRAM(s) Oral before breakfast    MEDICATIONS  (PRN):  acetaminophen     Tablet .. 650 milliGRAM(s) Oral every 6 hours PRN Temp greater or equal to 38C (100.4F), Mild Pain (1 - 3)  dextrose Oral Gel 15 Gram(s) Oral once PRN Blood Glucose LESS THAN 70 milliGRAM(s)/deciliter  ondansetron Injectable 4 milliGRAM(s) IV Push every 8 hours PRN Nausea and/or Vomiting      Allergies    eggs (Anaphylaxis)  No Known Drug Allergies        Vital Signs Last 24 Hrs  T(C): 36.8 (25 May 2022 09:00), Max: 36.8 (25 May 2022 09:00)  T(F): 98.2 (25 May 2022 09:00), Max: 98.2 (25 May 2022 09:00)  HR: 65 (25 May 2022 09:00) (65 - 68)  BP: 157/79 (25 May 2022 09:00) (137/84 - 173/93)  BP(mean): --  RR: 18 (25 May 2022 09:00) (18 - 20)  SpO2: 95% (25 May 2022 09:00) (95% - 99%)      PHYSICAL EXAM:    GENERAL: Appears chronically ill in bed  HEAD:  wnl  EYES:   ENMT:   NECK: veins wnl  NERVOUS SYSTEM: alert, verbal   CHEST/LUNG: No 02, no wheezes  HEART: RR, No rub  ABDOMEN: NT   EXTREMITIES:  stasis  changes legs, muscle wasting  LYMPH:   SKIN: pale    LABS:                        9.3    5.43  )-----------( 299      ( 25 May 2022 06:03 )             31.3     05-25    131<L>  |  93<L>  |  33.5<H>  ----------------------------<  219<H>  6.0<H>   |  26.0  |  5.04<H>    Ca    9.0      25 May 2022 06:03    TPro  x   /  Alb  x   /  TBili  0.5  /  DBili  x   /  AST  x   /  ALT  x   /  AlkPhos  x   05-24    PT/INR - ( 24 May 2022 05:44 )   PT: 13.5 sec;   INR: 1.16 ratio                     RADIOLOGY & ADDITIONAL TESTS:

## 2022-05-25 NOTE — DISCHARGE NOTE NURSING/CASE MANAGEMENT/SOCIAL WORK - PATIENT PORTAL LINK FT
You can access the FollowMyHealth Patient Portal offered by [facility] by registering at the following website: [email or web address]. By joining [employer]’s FollowMyHealth portal, you will also be able to view your health information using other applications (apps) compatible with our system.

## 2022-05-25 NOTE — DISCHARGE NOTE PROVIDER - CARE PROVIDER_API CALL
Srini Gay (MD)  Internal Medicine; Nephrology  [address], [address]  [place], [place] [address]  Phone: [phone]  Fax: [phone]  Follow Up Time: 1 week    PCP,   Phone: (   )    -  Fax: (   )    -  Follow Up Time: 1 week    Elio Hernandes)  Cardiac Electrophysiology  [address] [place] [address]  Phone: [phone]  Fax: [phone]  Follow Up Time: 1 week

## 2022-05-25 NOTE — CHART NOTE - NSCHARTNOTEFT_GEN_A_CORE
RN called to report patient K= 6  Schedule  for HD this AM on way to dialysis with betty.  Will check repeat labs after HD
 services used over phone   admitted with fever, palpitations - found to be in Afib RVR, reported temp of 100.7F in ER   sepsis work up in progress, on empiric antibiotics  denies any diarrhea, endorses fever and chills on the day of presentation   f/u cultures  On exam - abdomen is non-tender, soft, para-umbilical hernia reducible. BS+ no peripheral edema   Will consider IR paracentesis in am   Last LVP - on 5/11 - Dr Mahoney 4.7l was drained.   Seen by dr Montoya  - hepatology - no evidence of cirrhosis - possibly ascites 2/2 ESRD.

## 2022-05-25 NOTE — PROGRESS NOTE ADULT - REASON FOR ADMISSION
SVT  PNA  rule out SBP

## 2022-05-25 NOTE — DISCHARGE NOTE PROVIDER - HOSPITAL COURSE
63 y/o F w/ PMH of ESRD on HF (M/W/F), CAD, CHFrEF (LVEF 40-45%), possible cirrhosis (recurrent ascites requiring intermittent paracentesis), pAF (currently not on AC 2/2 hx of prior GIB), T2DM, HTN, HLD, hypothyroid presented to ED sent by PCP on 5/21/25 for chest pain & tachycardia x1 day. Pt was previously discharged 5/13 for hemoperitoneum after paracentesis (performed every 2 weeks by IR) for recurrent ascites. In ED had AFib with RPR that spontaneously converted and 2nd EKG showed accelerated junctional rhythm, lopressor was started. Cardiology and EP consulted and recommend no further inpatient management, but to f/u in office. Pt was febrile in ED with possible dx of SBP & started on vanc + zosyn. Throughout stay, neg blood cultures and CT shows resolving hemoperitoneum but persistent ascites. Diagnostic paracentesis not performed as pt was afebrile and without leukocytosis, IV abx completed. Pt to continue paracentesis by IR every 2 weeks. During admission had pulmonary edema 2/2 w/ hx of NICM and ESRD, was weaned off O2 and now on room air, getting dialysis today 5/25/22.  Pt was assessed and examined bedside and is ready for discharge after HD session today if medically stable.

## 2022-05-25 NOTE — DISCHARGE NOTE PROVIDER - NSDCMRMEDTOKEN_GEN_ALL_CORE_FT
alendronate 70 mg oral tablet: 1 tab(s) orally once a week  amLODIPine 10 mg oral tablet: 1 tab(s) orally once a day  aspirin 81 mg oral delayed release tablet: 1 tab(s) orally once a day  atorvastatin 40 mg oral tablet: 1 tab(s) orally once a day  calcium acetate 667 mg oral tablet: 1 tab(s) orally 3 times a day   hydrALAZINE 25 mg oral tablet: 1 tab(s) orally 3 times a day  levothyroxine 50 mcg (0.05 mg) oral tablet: 1 tab(s) orally once a day  metoprolol succinate 50 mg oral tablet, extended release: 1 tab(s) orally once a day  Protonix 20 mg oral delayed release tablet: 1 tab(s) orally once a day   alendronate 70 mg oral tablet: 1 tab(s) orally once a week  amLODIPine 10 mg oral tablet: 1 tab(s) orally once a day  aspirin 81 mg oral delayed release tablet: 1 tab(s) orally once a day  atorvastatin 40 mg oral tablet: 1 tab(s) orally once a day  calcium acetate 667 mg oral tablet: 1 tab(s) orally 3 times a day   hydrALAZINE 25 mg oral tablet: 1 tab(s) orally 3 times a day  levothyroxine 50 mcg (0.05 mg) oral tablet: 1 tab(s) orally once a day  Metoprolol Tartrate 50 mg oral tablet: 1 tab(s) orally 2 times a day  Protonix 20 mg oral delayed release tablet: 1 tab(s) orally once a day

## 2022-05-25 NOTE — CDI QUERY NOTE - NSCDIOTHERTXTBX_GEN_ALL_CORE_HH
ED-  61 y/o F with a h/o ESRD on HD (M/W/F), CAD, CHFrEF (LVEF 40-45%),   Principal Discharge DX:	Pulmonary edema  Secondary Diagnosis:	Cirrhosis  Secondary Diagnosis:	Atrial fibrillation.    H&P- CHEST/LUNG: dec BS bilateral, crackles at bases more on the right  # Afib with RVR spontaneously converted in ED   # Pulmonary edema:  from volume overload with h/o NICM with ESRD  currently req 2 liter O2 much comfortable    < from: CT Chest No Cont (05.21.22 @ 18:48) >    IMPRESSION:  Limited noncontrast exam.    Pulmonary edema and trace bilateral pleural effusions. Patchy   consolidative opacities within the right lower lobe may be secondary to   atelectasis combined with pulmonary edema, though superimposed infection   is not excluded.    < end of copied text >    Please specify acuity of CHF    1) Acute on chronic HFrEF  2) Other ( please specify)  3) Undetermined

## 2022-05-25 NOTE — DISCHARGE NOTE PROVIDER - NSDCCPCAREPLAN_GEN_ALL_CORE_FT
PRINCIPAL DISCHARGE DIAGNOSIS  Diagnosis: Pulmonary edema  Assessment and Plan of Treatment: Resolving. Fluid within your lungs was found during your stay. You were given supplemental O2 and were weaned off of it during your stay. Follow up with cardiology and nephrology in 1-2 weeks.      SECONDARY DISCHARGE DIAGNOSES  Diagnosis: Cirrhosis  Assessment and Plan of Treatment: Possible cirrhosis based off prior imaging    Diagnosis: Atrial fibrillation  Assessment and Plan of Treatment: You experienced atrial fibrillation, an irregular heartrate when you were in ED that spontaneously resolved. During your stay you received medication to keep your heart rate under control. Please follow up with cardiology in 1-2 weeks.    Diagnosis: Ascites  Assessment and Plan of Treatment: You have recurrent fluid in your abdomen due to a liver or cardiac origin. A diagnostic paracentesis was not necessary during your stay as you did not have a fever or abnormal labs indicating an infection. Please continue to receive biweekly paracentesis.    Diagnosis: ESRD on hemodialysis  Assessment and Plan of Treatment: Continue to received M/W/F hemodialysis outpatient. Follow up with nephrology in 1-2 weeks.    Diagnosis: Hemoperitoneum  Assessment and Plan of Treatment: History of hemoperiontoneum, resolving. Can continue aspirin at home.    Diagnosis: Hypertension  Assessment and Plan of Treatment: Monitored during your stay. Continue to take home medications as prescribed.     PRINCIPAL DISCHARGE DIAGNOSIS  Diagnosis: Acute on chronic systolic heart failure  Assessment and Plan of Treatment: Resolving. Fluid within your lungs was found during your stay. You were given supplemental O2  ,HR controlled and dialsyis was administered, and were weaned off of it during your stay. Follow up with cardiology and nephrology in 1-2 weeks.      SECONDARY DISCHARGE DIAGNOSES  Diagnosis: Atrial fibrillation  Assessment and Plan of Treatment: You experienced atrial fibrillation, an irregular heartrate when you were in ED that spontaneously resolved. During your stay you received medication to keep your heart rate under control. Please follow up with cardiology in 1-2 weeks.    Diagnosis: Ascites  Assessment and Plan of Treatment: You have recurrent fluid in your abdomen due to a liver or cardiac origin. . Please continue to to fu with your PMD for regular paracentesis.    Diagnosis: ESRD on hemodialysis  Assessment and Plan of Treatment: Continue to received M/W/F hemodialysis outpatient. Follow up with nephrology in 1-2 weeks.    Diagnosis: Hemoperitoneum  Assessment and Plan of Treatment: History of hemoperiontoneum, resolving. Can continue aspirin at home.    Diagnosis: Hypertension  Assessment and Plan of Treatment: Monitored during your stay. Continue to take home medications as prescribed.

## 2022-05-25 NOTE — DISCHARGE NOTE PROVIDER - PROVIDER TOKENS
PROVIDER:[TOKEN:[[id number]],FOLLOWUP:[1 week]],FREE:[LAST:[PCP],PHONE:[(   )    -],FAX:[(   )    -],FOLLOWUP:[1 week]],PROVIDER:[TOKEN:[[id number]],FOLLOWUP:[1 week]]

## 2022-05-25 NOTE — PROGRESS NOTE ADULT - ASSESSMENT
61 yo female with PMH of ESRD on HD (M/W/F), CAD, CHFrEF (LVEF 40-45%), cirrhosis (?VELA, recurrent ascites requiring intermittent paracentesis) pAFib not on ac, DM2, chronic anemia, HTN, HLD, recent admission on 2 weeks back for pneumoperitoneum managed conservatively. during last admission she was also found to have SVT seen by cardiology. She was referred here by her physician for chest pain and tachycardia. she reports she chest pain started with tachycardia, also reports generalized weakness. she is a very poor historian and history is limited. Denies LOC, seizure, focal weakness. she also reports rt sided abdominal pain present present for a while.        # Afib with RVR spontaneously converted in ED (as per the signout from ED provider)  EKG performed  in ED later shows accelerated junctional tachycardia  patient not on AC due to high bleeding risk  on lopressor 25 bid  telemetry monitoring  cardiology following      # Fever unclear source; r/o SBP  f/u blood culture  on vanc and zosyn  peritoneal tap not done due to recent hemoperitoneum. CT scan with improving hemoperitoneum   consult ir to perform diagnostic and possibly therapeutic paracentesis but patient without any leucocytosis, afebrile  imaging reveals pulmonary edema with (rt lower lobe consolidation mildly worse then prior)  continue zosyn for now, f/u cultures - will dc antibiotics if cultures NG - then monitor off iv abs.   GI consulted       # Ascites 2/2 ?Liver Cirrhosis vs cardiac   on zosyn for suspected SBP      # Pulmonary edema:  from volume overload with h/o NICM with ESRD  currently req 2 liter O2 much comfortable  nephrology following for dialysis - HD today   cardiology following, check BNP     # ESRD on HD:  nephrology   left Arm av fistula    # recent hemoperitoneum:  was cleared to take aspirin since prior discharge  hold aspirin tonight in anticipation for tap (high risk for bleed) then restart later  per the ct imaging now---hemoperitoneum resolving but persistent ascites    # Anemia of Chronic disease:  monitor H and H  f/u with nephrology    #HTN-Essential  monitor blood pressure  metoprolol    #DVT Prophylaxis  venodynes              
61 yo female with PMH of ESRD on HD (M/W/F), CAD, CHFrEF (LVEF 40-45%), pAFib not on ac, DM2, chronic anemia, HTN, HLD, incarcerated ventral hernia (s/p partial SBR in 03/21), ?cirrhosis (VELA, recurrent ascites requiring intermittent paracentesis),  presented to ED after being referred by her physician for chest pain and tachycardia and found to be in Afib RVR.   Previously seen by GI in 12/2021 for recurrent nausea, vomiting with EGD done showing non-erosive gastritis. Otherwise normal.  Last LVP - on 5/11 - Dr Mahoney 4.7l was drained.   Likely cardiac ascites as it is unclear whether patient has chronic liver disease.   Ascitic fluid from tap in 12/21 showed no evidence of SBP. BF total protein >2.5. SAAG> 1.1 suggestive of cardiac ascites.     Continue to trend CBC, LFTs, coags.   Diet as tolerated  Optimize cardiac/volume status.    No further GI recommendations at this time.       
63 yo F w/ h/o mild NICM (LVEF 40-45% by TTE in 02/2021; 2019: negative LHC), h/o PAF (2017 [facility]: brief inpatient episode likely secondary to medical procedure; MCOT 28-day in 03/2021: 0 episodes of AF, therefore A/C discontinued), ESRD, cirrhosis (~VELA; Q 2week paracentesis regimen), DM, HTN, HLD who presented to [facility] ED on 05/10 with abdominal pain/hemoperitoneum/peritoneal hematoma in the setting of IR large volume paracentesis that same day and had SVT on 05/11 in MICU that broke with adenosine and patient re-presented on 05/22 for tachycardia in 140's secondary to possible SVT (vs. AF) that broke spontaneously.     1. SVT  - terminated with adenosine. No recurrence over past 24 hrs. Appears triggered by acute illness  2. PAF - remote history with no known recurrence since 2017    PLAN  Continue metoprolol ER 50mg BID  Outpatient follow up with me in 1-2 weeks.     
ESRD: fever, tachyarrhythmia  - HD today  - empiric antibiotics; follow up cultures    Anemia: +CKD; + recent hemoperitoneum  - MARGI at HD  - PRBCs as needed    RO: +CKD  - cont Phoslo  - low phos diet
61 yo F w/ h/o mild NICM (LVEF 40-45% by TTE in 02/2021; 2019: negative LHC), h/o PAF (2017 [facility]: brief inpatient episode likely secondary to medical procedure; MCOT 28-day in 03/2021: 0 episodes of AF, therefore A/C discontinued), ESRD, cirrhosis (~VELA; Q 2week paracentesis regimen), DM, HTN, HLD who presented to [facility] ED on 05/10 with abdominal pain/hemoperitoneum/peritoneal hematoma in the setting of IR large volume paracentesis that same day and had SVT on 05/11 in MICU that broke with adenosine and patient re-presented on 05/22 for tachycardia in 140's secondary to possible SVT (vs. AF) that broke spontaneously.     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 PAF  3. LVEF ~40-45% (chronic; mild NICM)  4. ESRD  5. Ascites/possible SBP     *05/23: patient being treated with IV abx's for FUO/possible SBP; HD today; possible paracentesis today; ECG from 05/23 likely shows SVT, possibly same AVNRT as before, possibly different pathway         PLAN:    1. Agree with chronic suppressive therapy with PO metoprolol   2. EP consult for possible recurrent SVT and catheter ablation   3. Employ IV adenosine/IV metoprolol/IV diltiazem as needed/appropriate/efficacious for recurrence   4. Monitor on telemetry   5. EP consult (Dr. Hernandes) to see patient tomorrow         
61 yo female with PMH of ESRD on HD (M/W/F), CAD, CHFrEF (LVEF 40-45%), cirrhosis (?VELA, recurrent ascites requiring intermittent paracentesis) pAFib not on ac, DM2, chronic anemia, HTN, HLD, recent admission on 2 weeks back for pneumoperitoneum managed conservatively. during last admission she was also found to have SVT seen by cardiology. She was referred here by her physician for chest pain and tachycardia. she reports she chest pain started with tachycardia, also reports generalized weakness. she is a very poor historian and history is limited.  she also reports rt sided abdominal pain present present for a while.        # Afib with RVR spontaneously converted in ED - Likely SVT   EKG performed  in ED later shows accelerated junctional tachycardia  patient not on AC due to high bleeding risk  on lopressor 25 bid  telemetry monitoring  cardiology/EP recs appreciated - no further intervention in house - f/u in office.       # Fever unclear source   blood culture - NG   on vanc and zosyn  peritoneal tap not done due to recent hemoperitoneum. CT scan with improving hemoperitoneum   Discussed with IR to perform diagnostic and therapeutic paracentesis but patient without any leucocytosis, afebrile  imaging reveals pulmonary edema with (rt lower lobe consolidation mildly worse then prior)  discontinue zosy,   cultures NG - then monitor off iv abs.   GI consulted - recs appreciated       # Ascites 2/2 ?Liver Cirrhosis vs cardiac   gets 2 weekly Paracentesis by IR       # Pulmonary edema:  from volume overload with h/o NICM with ESRD  currently req 2 liter O2  nephrology following for dialysis - s/p HD 5/23   wean off O2 as tolerated and ambulatory desat study     # ESRD on HD:  nephrology   left Arm av fistula    # recent hemoperitoneum:  was cleared to take aspirin since prior discharge  hold aspirin tonight in anticipation for tap (high risk for bleed) then restart later  per the ct imaging now---hemoperitoneum resolving but persistent ascites    # Anemia of Chronic disease:  monitor H and H, f/u with nephrology    #HTN-Essential  monitor blood pressure, etoprolol    #DVT Prophylaxis  venodynes    Dispo - Likely in 24hrs - Monitor of antibiotics if afebrile - will not pursue inpatient paracentesis - as discussed with IR,   Wean off O2, ambulatory desat study   possible Home in am after HD if stable.             
ESRD: fever, tachyarrhythmia  - HD tomorrow  - empiric antibiotics; follow up cultures    Anemia: +CKD; + recent hemoperitoneum  - MARGI at HD  - PRBCs as needed    RO: +CKD  - cont Phoslo  - low phos diet
A) DM 2, CRF 5, Anemia.    P) HD  today. Epogen.

## 2022-05-31 NOTE — REASON FOR VISIT
[Procedure: _________] : a [unfilled] procedure visit [Source: ______] : History obtained from [unfilled] [FreeTextEntry1] : ascites [Interpreters_IDNumber] : [id number] [Interpreters_FullName] : Riddhi [TWNoteComboBox1] : [place]

## 2022-05-31 NOTE — HISTORY OF PRESENT ILLNESS
[FreeTextEntry1] : PRE CALL PRIOR TO APPOINTMENT: \par \par Diagnosis: Ascites \par \par COVID-19 SWAB: Advised to go \par \par RX on file: YES\par \par Referring MD: Fredy Mandujano \par \par Clotting or Bleeding disorders: patient denies \par \par PPM / Defibrillator: patient denies \par \par NPO status advised: yes\par \par History of fall: patient denies / walks with assistance \par \par Assistant device for walking: yes\par \par  home: yes\par \par Blood Thinners: patient denies \par \par Prescribing MD agree to have blood thinner medication held: N/A\par \par Capacity to make decisions: Yes\par \par HCP: yes daughter \par \par DNR: NO, patient denies \par \par Person contact for Pre-Call: daughter \par \par --------------------------------------------------------------------------------------------------------\par Nidhi- Operative Assessment: (Day of Procedure)\par \par NPO status: yes\par \par Falls risk: yes, yellow bracelet on \par \par Labs: IN CHART \par \par IR MD:Leno Mahoney \par \par Urine Pregnancy: N/A\par \par PRE-OP instructions: YES\par \par POST-OP teaching initiated: YES\par \par Allergy bracelet on: n/a\par \par Antibiotic given: NO \par \par \par

## 2022-06-01 NOTE — HISTORY OF PRESENT ILLNESS
[FreeTextEntry1] : per daughter Keshia patient currently admitted to [facility]; IR at [facility] made aware, no procedure done at [facility] \par \par

## 2022-06-01 NOTE — CDI QUERY NOTE - NSCDIOTHERTXTBX_GEN_ALL_CORE_HH
H&P- Reason for Admission: SVT  PNA  rule out SBP  # Fever unclear source; r/o SBP  blood culture obtained  order ua  s/p vanc and zosyn in ed  ED did not perform peritoneal tap due to recent hemoperitoneum  consult ir to perform diagnostic and possibly therapeutic paracentesis  imaging reveals pulmonary edema with (rt lower lobe consolidation mildly worse then prior)  continue zosyn for now    < from: CT Chest No Cont (05.21.22 @ 18:48) >    Pulmonary edema and trace bilateral pleural effusions. Patchy   consolidative opacities within the right lower lobe may be secondary to   atelectasis combined with pulmonary edema, though superimposed infection   is not excluded.    < end of copied text >    Cardio- Reason for Admission:  · Reason for Admission  SVT  PNA  rule out SBP    D/C note-  Reason for Admission  SVT  PNA  rule out SBP    Pt was febrile in ED with possible dx of SBP & started on vanc + zosyn. Throughout stay, neg blood culture    PRINCIPAL DISCHARGE DIAGNOSIS  Diagnosis: Acute on chronic systolic heart failure    Please clarify the status of pneumonia since it is only documented in the header but not documented in assessment/ diagnosis.    1) Suspected pneumonia present on admission resolved  2) After study, pneumonia ruled out  3) Other ( please specify)  4) Not clinically significant H&P- Reason for Admission: SVT  PNA  rule out SBP  # Fever unclear source; r/o SBP  blood culture obtained  order ua  s/p vanc and zosyn in ed  ED did not perform peritoneal tap due to recent hemoperitoneum  consult ir to perform diagnostic and possibly therapeutic paracentesis  imaging reveals pulmonary edema with (rt lower lobe consolidation mildly worse then prior)  continue zosyn for now    < from: CT Chest No Cont (05.21.22 @ 18:48) >    Pulmonary edema and trace bilateral pleural effusions. Patchy   consolidative opacities within the right lower lobe may be secondary to   atelectasis combined with pulmonary edema, though superimposed infection   is not excluded.    < end of copied text >    Cardio- Reason for Admission:  · Reason for Admission  SVT  PNA  rule out SBP    D/C note-  Reason for Admission  SVT  PNA  rule out SBP    Pt was febrile in ED with possible dx of SBP & started on vanc + zosyn. Throughout stay, neg blood culture    PRINCIPAL DISCHARGE DIAGNOSIS  Diagnosis: Acute on chronic systolic heart failure    Please clarify the status of pneumonia since it is only documented in the header but not documented in assessment/ diagnosis.    1) Suspected pneumonia present on admission resolved ( please specify type bacteria))  2) After study, pneumonia ruled out  3) Other ( please specify)  4) Not clinically significant

## 2022-06-15 NOTE — ASSESSMENT
[FreeTextEntry1] : 2300 ml  (Merlot color) ascites fluid drained. Reportedly similar to the prior paracentesis

## 2022-06-15 NOTE — HISTORY OF PRESENT ILLNESS
[FreeTextEntry1] : PRE CALL PRIOR TO APPOINTMENT: \par \par Diagnosis: Ascites \par \par COVID-19 SWAB: Advised to go \par \par RX on file: YES\par \par Referring MD: Fredy Mandujano \par \par Clotting or Bleeding disorders: patient denies \par \par PPM / Defibrillator: patient denies \par \par NPO status advised: yes\par \par History of fall: patient denies / walks with assistance \par \par Assistant device for walking: yes\par \par  home: daughter Keshia\par \par Blood Thinners: patient denies \par \par Prescribing MD agree to have blood thinner medication held: N/A\par \par Capacity to make decisions: Yes\par \par HCP: yes daughter \par \par DNR: NO, patient denies \par \par Person contact for Pre-Call: Spoke with Keshia (daughter) by Deepti TINOCO\par \par --------------------------------------------------------------------------------------------------------\par Nidhi- Operative Assessment: (Day of Procedure)\par \par NPO status: yes\par \par Falls risk: yes, yellow bracelet on \par \par Labs: IN CHART \par \par IR MD:Leno Mahoney \par \par Urine Pregnancy: N/A\par \par PRE-OP instructions: YES\par \par POST-OP teaching initiated: YES\par \par Allergy bracelet on: n/a\par \par Antibiotic given: NO \par

## 2022-06-15 NOTE — REASON FOR VISIT
[Procedure: _________] : a [unfilled] procedure visit [Source: ______] : History obtained from [unfilled] [FreeTextEntry1] : diagnosis of ascites [Interpreters_IDNumber] : [id number] [Interpreters_FullName] : Vlad [TWNoteComboBox1] : Stateless

## 2022-06-16 PROBLEM — R79.89 LFTS ABNORMAL: Status: ACTIVE | Noted: 2022-01-01

## 2022-06-16 PROBLEM — K74.60 CIRRHOSIS OF LIVER WITH ASCITES, UNSPECIFIED HEPATIC CIRRHOSIS TYPE: Status: ACTIVE | Noted: 2022-01-01

## 2022-06-16 PROBLEM — R18.8 OTHER ASCITES: Status: ACTIVE | Noted: 2020-01-02

## 2022-06-17 NOTE — REASON FOR VISIT
[Follow-Up: _____] : a [unfilled] follow-up visit [Other: ______] : provided by TOMMY [FreeTextEntry1] : ascites [Interpreters_FullName] : Shirley [TWNoteComboBox1] : B[place]

## 2022-06-17 NOTE — HISTORY OF PRESENT ILLNESS
[Fatigue] : denies fatigue [Malaise] : denies malaise [Fever] : denies fever [Diffuse Joint Pain (Arthralgias)] : denies arthralgias [Muscle Aches, Generalized (Myalgias)] : denies myalgias [Urine Tests Nonspecific Abnormal Findings Biliuria] : denies dark urine [Light Colored Bowel Movement (Acholic Stools)] : denies pale stools [Insomnia] : denies insomnia [Skin: Rash] : denies rash [Itching (Pruritus)] : denies pruritus [Shortness Of Breath] : denies shortness of breath [de-identified] : JAROCHO MORALES is a 62 year old  female with a PMH significant for ESRD on HD (M/W/F), CAD, CHFrEF (LVEF 40-45%), possible cirrhosis (recurrent ascites requiring intermittent paracentesis), paroxysmal AFib (currently not on AC 2/2 hx of prior GIB), T2DM, HTN, HLD, and hypothyroid. She is here today for follow up.\par \par Pt was recently admitted at [facility] for chest pain and tachycardia. Hospital course as follows: Pt presented to ED sent by PCP on 5/21/22 for chest pain & tachycardia x1 day. Pt was previously discharged 5/13 for hemoperitoneum after paracentesis (performed every 2 weeks by IR) for recurrent ascites. Hospital course complicated by AFib with RPR, possible infection requiring iv antibiotics. CT showed resolving hemoperitoneum but persistent ascites. Diagnostic paracentesis not performed as pt was afebrile and without leukocytosis, IV abx completed. Discharged home on 5/25/22.\par \par Most recent paracentesis on 6/14/22, 2300 ml "merlot" fluid drained. Next scheduled for 6/28/22.\par \par Pt complaining of abdominal pain on palpation but denies chest pain, sob, nausea, vomiting, diarrhea, bloody bms.

## 2022-06-17 NOTE — ASSESSMENT
[FreeTextEntry1] : JAROCHO MORALES is a 62 year old  female with a PMH significant for ESRD on HD (M/W/F), CAD, CHFrEF (LVEF 40-45%), possible cirrhosis (recurrent ascites requiring intermittent paracentesis), paroxysmal AFib (currently not on AC 2/2 hx of prior GIB), T2DM, HTN, HLD, and hypothyroid.\par \par \par Labs ordered.\par CT abdomen ordered to r/o hemoperitoneum\par Continue paracentesis q 2-3 weeks.\par No diuretics, pt is anuric.\par F/U with Cardiology, appt for 6/24/22 with Dr. Hernandes (Tel: [phone])\par

## 2022-06-17 NOTE — PHYSICAL EXAM
[Abdominal  Ascites] : ascites [General Appearance - Alert] : alert [General Appearance - In No Acute Distress] : in no acute distress [Neck Appearance] : the appearance of the neck was normal [Sclera] : the sclera and conjunctiva were normal [Neck Cervical Mass (___cm)] : no neck mass was observed [Jugular Venous Distention Increased] : there was no jugular-venous distention [Thyroid Diffuse Enlargement] : the thyroid was not enlarged [Thyroid Nodule] : there were no palpable thyroid nodules [Abdomen Soft] : soft [Skin Color & Pigmentation] : normal skin color and pigmentation [Skin Turgor] : normal skin turgor [] : no rash [Oriented To Time, Place, And Person] : oriented to person, place, and time [Impaired Insight] : insight and judgment were intact [Affect] : the affect was normal [Scleral Icterus] : No Scleral Icterus [Spider Angioma] : No spider angioma(s) were observed [Jaundice] : No jaundice [Palmar Erythema] : no Palmar Erythema [Depression] : no depression [Hallucinations] : ~T no ~M hallucinations [Delusions] : no ~T delusions [Suicidal Ideation] : no suicidal ideation [FreeTextEntry1] : uses cane

## 2022-06-17 NOTE — ADDENDUM
[FreeTextEntry1] : I, Adriana Najera NP, acted as scribe for ELIZA Bethea for this patient encounter.

## 2022-06-30 NOTE — HISTORY OF PRESENT ILLNESS
[FreeTextEntry1] : JAROCHO MORALES is being seen for a Paracentesis procedure visit, diagnosis of ascites. \par \par PRE CALL PRIOR TO APPOINTMENT: \par \par Diagnosis: Ascites \par \par COVID-19 SWAB: Advised to go \par \par RX on file: YES\par \par Referring MD: Fredy Mandujano \par \par Clotting or Bleeding disorders: patient denies \par \par PPM / Defibrillator: patient denies \par \par NPO status advised: yes\par \par History of fall: patient denies / walks with assistance \par \par Assistant device for walking: yes\par \par  home: daughter Keshia\par \par Blood Thinners: patient denies \par \par Prescribing MD agree to have blood thinner medication held: N/A\par \par Capacity to make decisions: Yes\par \par HCP: yes daughter \par \par DNR: NO, patient denies \par \par Person contact for Pre-Call: \par \par --------------------------------------------------------------------------------------------------------\par Nidhi- Operative Assessment: (Day of Procedure)\par \par NPO status: yes\par \par Falls risk: yes, yellow bracelet on \par \par Labs: IN CHART \par \par IR MD:Leno Mahoney \par \par Urine Pregnancy: N/A\par \par PRE-OP instructions: YES\par \par POST-OP teaching initiated: YES\par \par Allergy bracelet on: n/a\par \par Antibiotic given: NO \par

## 2022-07-14 NOTE — PATIENT PROFILE ADULT - FUNCTIONAL SCREEN CURRENT LEVEL: COMMUNICATION, MLM
PROBLEM LIST INCLUDES:  1. Heme-positive stools  2. Anemia    HISTORY:  The patient is a pleasant 74-year-old male who has a known history of colon cancer and a history of colon polyps.  He is being seen today for noted heme-positive stools.  He states that he has been feeling well with no complaints of abdominal pain.  He denies any melena or hematochezia.  Weight has been stable.  Patient's last colonoscopy was performed in 2020.  His last upper endoscopy was performed in 2016.  Patient remains on Coumadin for a history of atrial fibrillation.  He also takes a baby aspirin daily.    Past Medical History:   Diagnosis Date   • AAA (abdominal aortic aneurysm) (CMS/HCC)     4/2019 per US of Aorta 3.1 cm   • Adenocarcinoma of transverse colon (CMS/HCC) 2/1/2016   • Anterior shoulder dislocation     slipped as adult   • Arthritis    • CAD (coronary artery disease)    • Carotid artery disease (CMS/HCC) 11/10/2015    [facility]/  Dr. Fairchild/  MICKEY Carotid endartectomy   • Chronic atrial fibrillation (CMS/HCC)    • Chronic renal insufficiency, stage III (moderate) (CMS/HCC)    • Combined forms of age-related cataract of both eyes 1/27/2022   • History of blood transfusion     colon CA,     • History of CVA (cerebrovascular accident)     JUly 13. had full recovery   • History of non-ST elevation myocardial infarction (NSTEMI)    • Hyperlipidemia    • Hypertension    • S/P CABG x 5, R-CEA, MAZE with AVE 11/10/2015         ALLERGIES:   Allergen Reactions   • Clonidine Hcl MYALGIA   • Amlodipine SWELLING         Current Outpatient Medications   Medication Sig Dispense Refill   • chlorthalidone (HYGROTEN) 50 MG tablet Take 1 tablet by mouth daily. 90 tablet 3   • atorvastatin (LIPITOR) 20 MG tablet Take 1 tablet by mouth daily. 90 tablet 3   • carvedilol (COREG) 25 MG tablet Take 1 tablet by mouth in the morning and 1 tablet in the evening. Take with meals. 180 tablet 3   • allopurinol (ZYLOPRIM) 100 AVE [Z95.1]    [facility]/ Dr. Fairchild   • [facility]       0 = understands/communicates without difficulty

## 2022-07-27 NOTE — HISTORY OF PRESENT ILLNESS
[FreeTextEntry1] : JAROCHO MORALES is being seen for a Paracentesis procedure visit, diagnosis of ascites. \par \par PRE CALL PRIOR TO APPOINTMENT: Keshia- (daughter) [phone]\par \par Diagnosis: Ascites \par \par COVID-19 SWAB: Advised to go \par \par RX on file: YES\par \par Referring MD: Fredy Mandujano \par \par Clotting or Bleeding disorders: patient denies \par \par PPM / Defibrillator: patient denies \par \par NPO status advised: yes\par \par History of fall: patient denies / walks with assistance \par \par Assistant device for walking: yes\par \par  home: daughter Keshia\par \par Blood Thinners: patient denies \par \par Prescribing MD agree to have blood thinner medication held: N/A\par \par Capacity to make decisions: Yes\par \par HCP: yes daughter \par \par DNR: NO, patient denies \par \par Person contact for Pre-Call: \par \par --------------------------------------------------------------------------------------------------------\par Nidhi- Operative Assessment: (Day of Procedure)\par \par NPO status: yes\par \par Falls risk: yes, yellow bracelet on \par \par Labs: IN CHART \par \par IR MD:Leno Mahoney \par \par Urine Pregnancy: N/A\par \par PRE-OP instructions: YES\par \par POST-OP teaching initiated: YES\par \par Allergy bracelet on: n/a\par \par Antibiotic given: NO \par \par

## 2022-07-27 NOTE — ED PROVIDER NOTE - HEME/LYMPH NEGATIVE STATEMENT, MLM
Called patient again no answer,left message to call office and reschedule appointment   no anemia, no easy bruising, no jaundice, no swollen lymph nodes.

## 2022-07-27 NOTE — ASSESSMENT
[FreeTextEntry1] : insufficient ascites for paracentesis.  no procedure performed\par \par patient c/o leg swelling.  US to r/o dvt ordered and performed prior to D/C.  NO DVT seen\par \par FORMAL REPORT TO FOLLOW\par

## 2022-07-28 NOTE — ED PROVIDER NOTE - PHYSICAL EXAMINATION
General: Well appearing in no acute distress. Alert and cooperative.   Head: Normocephalic, atraumatic.  Eyes: PERRLA. No conjunctival injection.   ENMT: Atraumatic external nose and ears.   Neck: Soft and supple. Full ROM without pain.   Cardiac: Regular rate and regular rhythm. No murmurs. Peripheral pulses 2+ and symmetric in all extremities. 3+ pitting edema.  Resp: Unlabored respiratory effort. Lungs b/l crackles at the base.   Abd: Soft, non-tender, non-distended.   MSK: Spine midline and non-tender.   Skin: Warm and dry.   Neuro: AO x 3. Moves all extremities symmetrically. Motor strength and sensation grossly intact.

## 2022-07-28 NOTE — ED ADULT NURSE NOTE - NSFALLRSKINDICATORS_ED_ALL_ED
89yoM w/ PMH A.fib (now paced) s/p radiation who presents after ground level fall, found with T3 and C5 TP fracture, R 8-10th posterior rib frx, and possible old left 8th rib frx. In no acute distress, HD stable.    PLan:    - PIC protocol  - C-collar in place  - AICD needs to be turned off by rep before MRI, TLSO brace at bedside, will FU PT   89yoM w/ PMH A.fib (now paced) s/p radiation who presents after ground level fall, found with T3 and C5 TP fracture, R 8-10th posterior rib frx, and possible old left 8th rib frx. In no acute distress, HD stable. started coumadin today (7/26)    PLan:    - PIC protocol  - C-collar in place  - AICD needs to be turned off by rep before MRI, TLSO brace at bedside, will FU PT   no

## 2022-07-28 NOTE — ED PROVIDER NOTE - ATTENDING CONTRIBUTION TO CARE
I, Micaela Joyner MD,  performed the initial face to face bedside interview with this patient regarding history of present illness, review of symptoms and relevant past medical, social and family history.  I completed an independent physical examination. I agree with the residents documentation except where as noted below  patient with hx dm htn recurrent ascites with recurrent IR paracentesis, esrd on HD MWF last HD yesterday chf with reduced EF 40% p/w abdominal pain and leg pain  vss  right basilar rales, tachypneic  tachycardic no murmur  abdomen soft nt nd no masses  ext left av graft, 3+ pitting edema b/l LE skin intact distal pulses intact   skin warm  oriented x 2  plan rectal temp labs bld cx x 2 cxr ekg I, Micaela Joyner MD,  performed the initial face to face bedside interview with this patient regarding history of present illness, review of symptoms and relevant past medical, social and family history.  I completed an independent physical examination. I agree with the residents documentation except where as noted below  patient with hx dm htn recurrent ascites with recurrent IR paracentesis, esrd on HD MWF last HD yesterday chf with reduced EF 40% p/w abdominal pain and leg pain  vss  right basilar rales, tachypneic  tachycardic no murmur  abdomen soft nt nd no masses  ext left av graft, 3+ pitting edema b/l LE skin intact distal pulses intact   skin warm  oriented x 2  plan rectal temp labs bld cx x 2 cxr ekg.

## 2022-07-28 NOTE — ED PROVIDER NOTE - PROGRESS NOTE DETAILS
[place]: CT with findings concerning for possible ileus, no discrete transition point identified, tolerated PO and clinically not obstructed.

## 2022-07-28 NOTE — ED PROVIDER NOTE - CLINICAL SUMMARY MEDICAL DECISION MAKING FREE TEXT BOX
62y Female with history of ESRD on HF (M/W/F), CAD, CHFrEF (LVEF 40-45%), recurrent ascites, pAF, T2DM, HTN, HLD, hypothyroid presenting with abdominal pain, nausea and worsening LE swelling and exertional shortness of breath. b/l crackles, tachypneic, 3+ pitting edema. Labs, cxr, urine, reassess.

## 2022-07-28 NOTE — ED PROVIDER NOTE - OBJECTIVE STATEMENT
62y Female with history of ESRD on HF (M/W/F), CAD, CHFrEF (LVEF 40-45%), recurrent ascites, pAF (currently not on AC 2/2 hx of prior GIB), T2DM, HTN, HLD, hypothyroid presenting with abdominal pain x 3 days with nausea and worsening LE swelling and exertional shortness of breath. Denies fevers, chills, headache, chest pain, palpitations, vomiting, diarrhea, hematuria, dysuria, dark stools, focal neurologic symptoms.

## 2022-07-28 NOTE — ED ADULT TRIAGE NOTE - CHIEF COMPLAINT QUOTE
Pt BIBA from home, c/o abdominal pain starting today, denies n/v/d, hernia surgery within past year, also b/l LE edema x 1 week, dialysis MWF

## 2022-07-29 NOTE — ED ADULT TRIAGE NOTE - CHIEF COMPLAINT QUOTE
BIBEMS because someone from this facility called patient to come back for a further work up due to abnormal lab work, unknown what lab work it is.  PT reports SOB, generalized weakness and sore throat.  Found to have an sPo2 in the mid 80s on RA. now on 4L NC with spo2 WNL. PT had dialysis today, RUE fistula in place. Denies chest pain.

## 2022-07-29 NOTE — ED PROVIDER NOTE - ATTENDING CONTRIBUTION TO CARE
61 yo F with hx of  ESRD on HD (M/W/F, last tx today), CHF returned to ED for abnormal CXR reprot which demonstrated R sided infiltrate.  Pt seen in ED yesterday and had abd CT which revealed CHF.  Pt had full dialysis today with no reported fever or elev WBC.  However, pt with + blood Cx with gram positive rods.  + chills with prod cough with white sputum.  On exam awake amd alert        HFrEF (LVEF 40-45%), ascites, AF (not on AC d/t h/o GIB), T2DM, HTN, HLD, and hypothyroid BIBEMS to ED for abnormal lab findings a/w SOB. Pt does not know which labs were abnormal or who called the ambulance, she just states "I didn't feel well." As per records, pt seen at [facility] ED yesterday for abdominal pain and was d/c. As per post-discharge note, blood cx grew gram positive rods, attempted to call 3x with no answer so called 911 for pt to return for further w/u. She also endorses chills, productive cough with white sputum, itching of her throat, and b/l knee pain at the present time. Pt denies fever, dizziness, loc, nasal congestion, hemoptysis, cp, palpitations, n/v/d, abdominal pain, dysuria, extremity numbness/weakness/tingling.  As per note yesterday, CXR + for lung infiltrate, CT chest - for infiltrate but + for effusion.

## 2022-07-29 NOTE — ED PROVIDER NOTE - CARE PLAN
1 Principal Discharge DX:	Fluid overload  Secondary Diagnosis:	Positive blood culture  Secondary Diagnosis:	End stage renal failure on dialysis
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
## 2022-07-29 NOTE — ED PROVIDER NOTE - ENMT, MLM
Head NCAT. Airway patent, Nasal mucosa clear. Mouth with normal mucosa. Throat has no vesicles, no oropharyngeal exudates and uvula is midline.

## 2022-07-29 NOTE — ED PROVIDER NOTE - OBJECTIVE STATEMENT
61 y/o F with PMH ESRD on HD (M/W/F, last tx today), HFrEF (LVEF 40-45%), ascites, AF (not on AC d/t h/o GIB), T2DM, HTN, HLD, and hypothyroid BIBEMS to ED for abnormal lab findings a/w SOB. Pt does not know which labs were abnormal or who called the ambulance, she just states "I didn't feel well." As per records, pt seen at [facility] ED yesterday for abdominal pain and was d/c. As per post-discharge note, blood cx grew gram positive rods, attempted to call 3x with no answer so called 911 for pt to return for further w/u. She also endorses chills, productive cough with white sputum, itching of her throat, and b/l knee pain at the present time. Pt denies fever, dizziness, loc, nasal congestion, hemoptysis, cp, palpitations, n/v/d, abdominal pain, dysuria, extremity numbness/weakness/tingling.  As per note yesterday, CXR + for lung infiltrate, CT chest - for infiltrate but + for effusion.

## 2022-07-29 NOTE — ED ADULT NURSE REASSESSMENT NOTE - NS ED NURSE REASSESS COMMENT FT1
Patient resting comfortably at this time Patient was able to eat and drink without issue patient is D/c. Multiple attempts made to get in touch with family were not successful at this time.

## 2022-07-29 NOTE — ED PROVIDER NOTE - CLINICAL SUMMARY MEDICAL DECISION MAKING FREE TEXT BOX
61 y/o F with PMH ESRD on HD (M/W/F, last tx today), HFrEF (LVEF 40-45%), ascites, AF (not on AC d/t h/o GIB), T2DM, HTN, HLD, and hypothyroid BIBEMS to ED for abnormal lab findings a/w SOB. Recent d/c from ED yesterday, blood cx grew gram + rods. PE: crackles to BLL and RML, 3+ pitting edema BLE. CT and CXR yesterday different readings.  Evaluate for pneumonia. CT without cont, IVF, IV abx, labs  Will monitor and reassess.

## 2022-07-30 NOTE — H&P ADULT - HISTORY OF PRESENT ILLNESS
63yo female with PMH of ESRD on HD (M/W/F), CAD, CHFrEF (LVEF 40-45%), cirrhosis (?VELA, recurrent ascites requiring intermittent paracentesis) pAFib not on ac, DM2, chronic anemia, HTN, HLD called back to ED for positive blood with gram positive rods. Pt reports to chills, productive cough with white sputum, sore throat and generalized weakenss. Denies fever, abdominal pain, cp, sob, palpitations. Pt was found to be hypoxic SPo2 in the 80s on RA placed on 4L NC.   63yo female with PMH of ESRD on HD (M/W/F), CAD, CHFrEF (LVEF 40-45%), cirrhosis (?VELA, recurrent ascites requiring intermittent paracentesis) pAFib not on ac 2/2 GIB, DM2, chronic anemia, HTN, HLD called back to ED for positive blood with gram positive rods. [place] [employer]  ID [id number] use, pt poor historian,  unable to understand pt and unable to obtain much history. Pt reports to not feeling well with some cough, sore throat and generalized weakness. She states that she missed dialysis yesterday. Denies fever, abdominal pain, cp, sob, palpitations. Pt was found to be hypoxic SPo2 in the 80s on RA placed on 2L NC, febrile 100.5, no leukocytosis. CT chest showed pulmonary edema and small bilateral pleural effusions, loculated on the right. Superimposed patchy opacities in the right middle lobe and lower lobe could be related to edema or reflect superimposed infection. Multiple nodular densities in the left lung and 8 mm nodule in the right upper lobe for which follow-up chest CT in 3 months is recommended. pt received vanco and zosyn in the ED.  63yo female with PMH of ESRD on HD (M/W/F), CAD, CHFrEF (LVEF 40-45%), cirrhosis (?VELA, recurrent ascites requiring intermittent paracentesis) pAFib not on ac 2/2 GIB, DM2, chronic anemia, HTN, HLD called back to ED for positive blood with gram positive rods. [place] [employer]  ID [id number] use, pt poor historian,  unable to understand pt and unable to obtain much history. Pt reports to not feeling well with some cough, sore throat and generalized weakness. She states that she missed dialysis yesterday. Pt also c/o b/l leg pain. Denies fever, abdominal pain, cp, sob, palpitations. Pt was found to be hypoxic SPo2 in the 80s on RA placed on 2L NC, febrile 100.5, no leukocytosis. CT chest showed pulmonary edema and small bilateral pleural effusions, loculated on the right. Superimposed patchy opacities in the right middle lobe and lower lobe could be related to edema or reflect superimposed infection. Multiple nodular densities in the left lung and 8 mm nodule in the right upper lobe for which follow-up chest CT in 3 months is recommended. pt received vanco and zosyn in the ED.

## 2022-07-30 NOTE — CONSULT NOTE ADULT - SUBJECTIVE AND OBJECTIVE BOX
HPI:  63yo female with PMH of ESRD on HD (M/W/F), CAD, CHFrEF (LVEF 40-45%), cirrhosis (?VELA, recurrent ascites requiring intermittent paracentesis) pAFib not on ac 2/2 GIB, DM2, chronic anemia, HTN, HLD called back to ED for positive blood with gram positive rods. [place] [employer]  ID [id number] use, pt poor historian,  unable to understand pt and unable to obtain much history. Pt reports to not feeling well with some cough, sore throat and generalized weakness. She states that she missed dialysis yesterday. Pt also c/o b/l leg pain. Denies fever, abdominal pain, cp, sob, palpitations. Pt was found to be hypoxic SPo2 in the 80s on RA placed on 2L NC, febrile 100.5, no leukocytosis. CT chest showed pulmonary edema and small bilateral pleural effusions, loculated on the right. Superimposed patchy opacities in the right middle lobe and lower lobe could be related to edema or reflect superimposed infection. Multiple nodular densities in the left lung and 8 mm nodule in the right upper lobe for which follow-up chest CT in 3 months is recommended. pt received vanco and zosyn in the ED.      PAST MEDICAL & SURGICAL HISTORY:  DM (diabetes mellitus)      HTN (hypertension)      HLD (hyperlipidemia)      End stage renal disease on dialysis      Pericardial effusion      Pleural effusion      Chronic systolic congestive heart failure      Anemia due to chronic kidney disease, unspecified CKD stage      Paroxysmal atrial fibrillation      Coronary artery disease, angina presence unspecified, unspecified vessel or lesion type, unspecified whether native or transplanted heart      Diabetes      End stage renal disease      Encounter for dialysis catheter care      A-V fistula      A-V fistula      FAMILY HISTORY:  Family history of kidney disease in brother (Sibling)    NC    Social History:Non smoker    MEDICATIONS  (STANDING):  amLODIPine   Tablet 10 milliGRAM(s) Oral daily  aspirin enteric coated 81 milliGRAM(s) Oral daily  atorvastatin 40 milliGRAM(s) Oral at bedtime  calcium acetate 667 milliGRAM(s) Oral three times a day with meals  heparin   Injectable 5000 Unit(s) SubCutaneous every 12 hours  hydrALAZINE 25 milliGRAM(s) Oral three times a day  levothyroxine 50 MICROGram(s) Oral daily  metoprolol succinate  milliGRAM(s) Oral daily  pantoprazole    Tablet 40 milliGRAM(s) Oral before breakfast  piperacillin/tazobactam IVPB.. 3.375 Gram(s) IV Intermittent every 8 hours    MEDICATIONS  (PRN):   Meds reviewed    Allergies    eggs (Anaphylaxis)      Vital Signs Last 24 Hrs  T(C): 36.8 (30 Jul 2022 04:09), Max: 38.1 (29 Jul 2022 21:18)  T(F): 98.3 (30 Jul 2022 04:09), Max: 100.5 (29 Jul 2022 21:18)  HR: 66 (30 Jul 2022 04:09) (66 - 93)  BP: 194/88 (30 Jul 2022 04:09) (166/75 - 194/88)  BP(mean): --  RR: 20 (30 Jul 2022 04:09) (20 - 20)  SpO2: 97% (30 Jul 2022 04:09) (97% - 100%)    Parameters below as of 29 Jul 2022 21:18  Patient On (Oxygen Delivery Method): nasal cannula  O2 Flow (L/min): 4    Daily Height in cm: 160.02 (29 Jul 2022 21:18)    Daily     PHYSICAL EXAM:    GENERAL: appears chronically ill, oriented.  HEAD:  Atraumatic, Normocephalic  EYES: EOMI, PERRLA, conjunctiva and sclera clear  ENMT: No tonsillar erythema, exudates, or enlargement; Moist mucous membranes, Good dentition, No lesions  NECK: Supple, neck  veins full  NERVOUS SYSTEM:  Alert & Oriented X3, Good concentration; Motor Strength wnl upper and lower extremities;CHEST/LUNG: Clear to percussion bilaterally; No rales, rhonchi, wheezing, or rubs  HEART: Regular rate and rhythm; No murmurs, rubs, or gallops  ABDOMEN: Soft, Nontender, Nondistended; Bowel sounds present, body wall and flank edema  EXTREMITIES:  +2 - +3 leg edema with sligt erythema edema  LYMPH: No lymphadenopathy noted  SKIN: No rashes or lesions, pale      LABS:                        10.6   5.64  )-----------( 225      ( 29 Jul 2022 22:56 )             35.1     07-29    135  |  93<L>  |  12.3  ----------------------------<  206<H>  4.7   |  31.0<H>  |  2.69<H>    Ca    9.0      29 Jul 2022 22:56    TPro  8.1  /  Alb  3.2<L>  /  TBili  0.6  /  DBili  x   /  AST  55<H>  /  ALT  18  /  AlkPhos  333<H>  07-29    PT/INR - ( 29 Jul 2022 22:56 )   PT: 14.0 sec;   INR: 1.20 ratio         PTT - ( 29 Jul 2022 22:56 )  PTT:31.3 sec            RADIOLOGY & ADDITIONAL TESTS:

## 2022-07-30 NOTE — PROGRESS NOTE ADULT - SUBJECTIVE AND OBJECTIVE BOX
[facility] Division of Hospital Medicine  Jose Ramon Muir MD    Chief Complaint:  Patient is a 62y old  Female who presents with a chief complaint of bacteriemia  missed dialysis (30 Jul 2022 07:38)      SUBJECTIVE / OVERNIGHT EVENTS:  Patient seen and examined at bedside. No acute events reported overnight. No new complaints.    MEDICATIONS  (STANDING):  amLODIPine   Tablet 10 milliGRAM(s) Oral daily  aspirin enteric coated 81 milliGRAM(s) Oral daily  atorvastatin 40 milliGRAM(s) Oral at bedtime  calcium acetate 667 milliGRAM(s) Oral three times a day with meals  heparin   Injectable 5000 Unit(s) SubCutaneous every 12 hours  hydrALAZINE 50 milliGRAM(s) Oral three times a day  levothyroxine 50 MICROGram(s) Oral daily  metoprolol succinate  milliGRAM(s) Oral daily  pantoprazole    Tablet 40 milliGRAM(s) Oral before breakfast  piperacillin/tazobactam IVPB.. 3.375 Gram(s) IV Intermittent every 8 hours    MEDICATIONS  (PRN):        I&O's Summary      PHYSICAL EXAM:  Vital Signs Last 24 Hrs  T(C): 37.2 (30 Jul 2022 09:06), Max: 38.1 (29 Jul 2022 21:18)  T(F): 99 (30 Jul 2022 09:06), Max: 100.5 (29 Jul 2022 21:18)  HR: 78 (30 Jul 2022 09:06) (66 - 93)  BP: 176/82 (30 Jul 2022 09:06) (166/75 - 194/88)  BP(mean): --  RR: 18 (30 Jul 2022 09:06) (18 - 20)  SpO2: 96% (30 Jul 2022 09:06) (95% - 100%)    Parameters below as of 30 Jul 2022 09:06  Patient On (Oxygen Delivery Method): nasal cannula            CONSTITUTIONAL: NAD  HEENT: NC/AT, PERRL, no JVD  RESPIRATORY: CTA bilaterally, normal effort  CARDIOVASCULAR: RRR, S1/S2+, no m/g/r  ABDOMEN: Nontender to palpation, normoactive bowel sounds, no rebound/guarding  MUSCULOSKELETAL: No edema, cyanosis or deformities.  PSYCH: Calm, affect appropriate.  NEUROLOGY: Awake, alert, no focal neurological deficits.   SKIN: No rashes; no palpable lesions  VASC: Distal pulses palpable    LABS:                        10.6   5.64  )-----------( 225      ( 29 Jul 2022 22:56 )             35.1     07-29    135  |  93<L>  |  12.3  ----------------------------<  206<H>  4.7   |  31.0<H>  |  2.69<H>    Ca    9.0      29 Jul 2022 22:56    TPro  8.1  /  Alb  3.2<L>  /  TBili  0.6  /  DBili  x   /  AST  55<H>  /  ALT  18  /  AlkPhos  333<H>  07-29    PT/INR - ( 29 Jul 2022 22:56 )   PT: 14.0 sec;   INR: 1.20 ratio         PTT - ( 29 Jul 2022 22:56 )  PTT:31.3 sec  CARDIAC MARKERS ( 29 Jul 2022 22:56 )  x     / 0.47 ng/mL / 175 U/L / x     / 4.7 ng/mL  CARDIAC MARKERS ( 28 Jul 2022 18:06 )  x     / 0.35 ng/mL / x     / x     / x              Culture - Blood (collected 28 Jul 2022 15:55)  Source: .Blood Blood-Venous  Preliminary Report (30 Jul 2022 02:02):    No growth to date.    Culture - Blood (collected 28 Jul 2022 15:52)  Source: .Blood Blood-Venous  Gram Stain (29 Jul 2022 14:04):    Growth in anaerobic bottle: Gram Positive Rods  Preliminary Report (29 Jul 2022 14:04):    Growth in anaerobic bottle: Gram Positive Rods    ***Blood Panel PCR results on this specimen are available    approximately 3 hours after the Gram stain result.***    Gram stain, PCR, and/or culture results may not always    correspond due to difference in methodologies.    ************************************************************    This PCR assay was performed by multiplex PCR. This    Assay tests for 66 bacterial and resistance gene targets.    Please refer to the [facility] test directory    at [email or web address] for details.  Organism: Blood Culture PCR (29 Jul 2022 16:10)  Organism: Blood Culture PCR (29 Jul 2022 16:10)      CAPILLARY BLOOD GLUCOSE            RADIOLOGY & ADDITIONAL TESTS:  Results Reviewed:   Imaging Personally Reviewed:  Electrocardiogram Personally Reviewed:

## 2022-07-30 NOTE — CONSULT NOTE ADULT - ASSESSMENT
Will arrange HD today     Full consult to follow 61yo female with PMH of ESRD on HD (M/W/F), CAD, CHFrEF (LVEF 40-45%), cirrhosis (?VELA, recurrent ascites requiring intermittent paracentesis) pAFib not on ac 2/2 GIB, DM2, chronic anemia, HTN, HLD  Pt was called back to ED for positive blood Cx with gram positive rods.     ESRD   Missed HD yest  Will arrange HD today   Consent obtained    Bacteremia + Blood Cx on Abx- Zosyn  Fever 100.5 yest    HTN BP not ideal inc Hydralazine to 50 mg TID    Will follow

## 2022-07-30 NOTE — PROGRESS NOTE ADULT - ASSESSMENT
63yo female with PMH of ESRD on HD (M/W/F), CAD, CHFrEF (LVEF 40-45%), cirrhosis (?VELA, recurrent ascites requiring intermittent paracentesis) pAFib not on ac 2/2 GIB, DM2, chronic anemia, HTN, HLD called back to ED for positive blood with gram positive rods.     Bacteremia  - BCx (6/28) - one set gram positive rods  - On Zosyn  - Follow repeat Cx, ID once resulted.  - Monitor WBC, fever curve.    PNA  - CT with suspected pneumonia  - Continue Zosyn    ESRD on HD  - HD today    B/l LE pain   -Duplex LE ordered     HTN  -resumed home meds     CAD  -cont ASA, statin     DVT ppx  heparin

## 2022-07-30 NOTE — H&P ADULT - NSHPPHYSICALEXAM_GEN_ALL_CORE
T(C): 36.8 (07-30-22 @ 04:09), Max: 38.1 (07-29-22 @ 21:18)  HR: 66 (07-30-22 @ 04:09) (66 - 93)  BP: 194/88 (07-30-22 @ 04:09) (166/75 - 194/88)  RR: 20 (07-30-22 @ 04:09) (20 - 20)  SpO2: 97% (07-30-22 @ 04:09) (97% - 100%)    GENERAL: patient appears well, no acute distress, appropriate, pleasant  EYES: sclera clear, no exudates  ENMT: oropharynx clear without erythema, no exudates, moist mucous membranes  NECK: supple, soft, no thyromegaly noted  LUNGS: good air entry bilaterally, clear to auscultation, symmetric breath sounds, no wheezing or rhonchi appreciated  HEART: soft S1/S2, regular rate and rhythm, no murmurs noted, no lower extremity edema  GASTROINTESTINAL: abdomen is soft, nontender, nondistended, normoactive bowel sounds, no palpable masses  INTEGUMENT: good skin turgor, warm skin, appears well perfused  MUSCULOSKELETAL: no clubbing or cyanosis, no obvious deformity  NEUROLOGIC: awake, alert, oriented x3, good muscle tone in 4 extremities, no obvious sensory deficits  PSYCHIATRIC: mood is good, affect is congruent, linear and logical thought process  HEME/LYMPH: no palpable supraclavicular nodules, no obvious ecchymosis or petechiae

## 2022-07-30 NOTE — ED ADULT NURSE NOTE - OBJECTIVE STATEMENT
pt BIBEMS after they were called as pt was found to have gram positive rods on blood cultures from previous visit. pt complaining of weakness, sore throat, SOB. pt seen and d/c'd yesterday due to abdominal pain. PIV placed. labs obtained. IV abx infusing. CM in place. as per triage RN, o2 sat 80% on RA, o2 sat 99% on 2L NC at this time. pt awaiting CT at this time.

## 2022-07-30 NOTE — H&P ADULT - ASSESSMENT
61yo female with PMH of ESRD on HD (M/W/F), CAD, CHFrEF (LVEF 40-45%), cirrhosis (?VELA, recurrent ascites requiring intermittent paracentesis) pAFib not on ac 2/2 GIB, DM2, chronic anemia, HTN, HLD called back to ED for positive blood with gram positive rods.     bacteriemia  -blood cx positive x 1 with GP rods   -pt with fever 100.5 on ED presentation   -repeat blood cx ordered   -cont with zosyn for now     pneumonia   -  ESRD missed dialysis  -no significant fluid overload on exam   -missed her Friday session due to not feeling well   -Nephro Dr. Gay group consulted    63yo female with PMH of ESRD on HD (M/W/F), CAD, CHFrEF (LVEF 40-45%), cirrhosis (?VELA, recurrent ascites requiring intermittent paracentesis) pAFib not on ac 2/2 GIB, DM2, chronic anemia, HTN, HLD called back to ED for positive blood with gram positive rods.     bacteriemia  -blood cx positive x 1 with GP rods   -pt with fever 100.5 on ED presentation   -repeat blood cx ordered   -cont with zosyn for now     pneumonia   -CT with suspected pneumonia  -pt c/o cough   -check procalcitonin   -cont zosyn for now     ESRD missed dialysis  -no significant fluid overload on exam   -missed her Friday session due to not feeling well   -Nephro Dr. Gay group consulted     B/l LE pain   -Dopplex LE ordered     HTN  -resumed home meds     CAD  -cont ASA, statin     DVT ppx  heparin

## 2022-07-30 NOTE — ED ADULT NURSE REASSESSMENT NOTE - NS ED NURSE REASSESS COMMENT FT1
Assumed care of pt at 0200, from EARNEST Lopez, charting as noted.  pt resting in bed, no acute distress noted at this time.

## 2022-07-31 NOTE — PROGRESS NOTE ADULT - SUBJECTIVE AND OBJECTIVE BOX
[facility] Division of Hospital Medicine  Jose Ramon Muir MD    Chief Complaint:  Patient is a 62y old  Female who presents with a chief complaint of bacteriemia  missed dialysis (31 Jul 2022 08:58)      SUBJECTIVE / OVERNIGHT EVENTS:  Patient seen and examined at bedside. No acute events reported overnight. No new complaints.    MEDICATIONS  (STANDING):  amLODIPine   Tablet 10 milliGRAM(s) Oral daily  aspirin enteric coated 81 milliGRAM(s) Oral daily  atorvastatin 40 milliGRAM(s) Oral at bedtime  calcium acetate 667 milliGRAM(s) Oral three times a day with meals  heparin   Injectable 5000 Unit(s) SubCutaneous every 12 hours  hydrALAZINE 50 milliGRAM(s) Oral three times a day  levothyroxine 50 MICROGram(s) Oral daily  metoprolol succinate  milliGRAM(s) Oral daily  pantoprazole    Tablet 40 milliGRAM(s) Oral before breakfast  piperacillin/tazobactam IVPB.. 3.375 Gram(s) IV Intermittent every 8 hours    MEDICATIONS  (PRN):        I&O's Summary    30 Jul 2022 07:01  -  31 Jul 2022 07:00  --------------------------------------------------------  IN: 0 mL / OUT: 1500 mL / NET: -1500 mL        PHYSICAL EXAM:  Vital Signs Last 24 Hrs  T(C): 36.6 (31 Jul 2022 07:28), Max: 37.3 (30 Jul 2022 15:37)  T(F): 97.9 (31 Jul 2022 07:28), Max: 99.2 (30 Jul 2022 15:37)  HR: 66 (31 Jul 2022 07:28) (66 - 114)  BP: 151/73 (31 Jul 2022 07:28) (151/73 - 199/74)  BP(mean): --  RR: 18 (31 Jul 2022 07:28) (18 - 19)  SpO2: 95% (31 Jul 2022 07:28) (91% - 97%)    Parameters below as of 31 Jul 2022 04:38  Patient On (Oxygen Delivery Method): nasal cannula            CONSTITUTIONAL: NAD  HEENT: NC/AT, PERRL, no JVD  RESPIRATORY: CTA bilaterally, normal effort  CARDIOVASCULAR: RRR, S1/S2+, no m/g/r  ABDOMEN: Nontender to palpation, normoactive bowel sounds, no rebound/guarding  MUSCULOSKELETAL: No edema, cyanosis or deformities.  PSYCH: Calm, affect appropriate.  NEUROLOGY: Awake, alert, no focal neurological deficits.   SKIN: No rashes; no palpable lesions  VASC: Distal pulses palpable    LABS:                        10.6   5.22  )-----------( 252      ( 31 Jul 2022 04:17 )             35.1     07-31    136  |  95<L>  |  14.6  ----------------------------<  259<H>  4.8   |  30.0<H>  |  2.84<H>    Ca    8.8      31 Jul 2022 04:17    TPro  7.8  /  Alb  3.0<L>  /  TBili  0.7  /  DBili  x   /  AST  46<H>  /  ALT  21  /  AlkPhos  380<H>  07-31    PT/INR - ( 29 Jul 2022 22:56 )   PT: 14.0 sec;   INR: 1.20 ratio         PTT - ( 29 Jul 2022 22:56 )  PTT:31.3 sec  CARDIAC MARKERS ( 29 Jul 2022 22:56 )  x     / 0.47 ng/mL / 175 U/L / x     / 4.7 ng/mL          Culture - Blood (collected 28 Jul 2022 15:55)  Source: .Blood Blood-Venous  Preliminary Report (30 Jul 2022 02:02):    No growth to date.    Culture - Blood (collected 28 Jul 2022 15:52)  Source: .Blood Blood-Venous  Gram Stain (29 Jul 2022 14:04):    Growth in anaerobic bottle: Gram Positive Rods  Preliminary Report (29 Jul 2022 14:04):    Growth in anaerobic bottle: Gram Positive Rods    ***Blood Panel PCR results on this specimen are available    approximately 3 hours after the Gram stain result.***    Gram stain, PCR, and/or culture results may not always    correspond due to difference in methodologies.    ************************************************************    This PCR assay was performed by multiplex PCR. This    Assay tests for 66 bacterial and resistance gene targets.    Please refer to the [facility] test directory    at [email or web address] for details.  Organism: Blood Culture PCR (29 Jul 2022 16:10)  Organism: Blood Culture PCR (29 Jul 2022 16:10)      CAPILLARY BLOOD GLUCOSE      POCT Blood Glucose.: 256 mg/dL (31 Jul 2022 07:50)  POCT Blood Glucose.: 297 mg/dL (30 Jul 2022 22:00)        RADIOLOGY & ADDITIONAL TESTS:  Results Reviewed:   Imaging Personally Reviewed:  Electrocardiogram Personally Reviewed:                                           [facility] Division of Hospital Medicine  Jose Ramon Muir MD    Chief Complaint:  Patient is a 62y old  Female who presents with a chief complaint of bacteriemia  missed dialysis (31 Jul 2022 08:58)      SUBJECTIVE / OVERNIGHT EVENTS:  Patient seen and examined at bedside. No acute events reported overnight. No new complaints.    MEDICATIONS  (STANDING):  amLODIPine   Tablet 10 milliGRAM(s) Oral daily  aspirin enteric coated 81 milliGRAM(s) Oral daily  atorvastatin 40 milliGRAM(s) Oral at bedtime  calcium acetate 667 milliGRAM(s) Oral three times a day with meals  heparin   Injectable 5000 Unit(s) SubCutaneous every 12 hours  hydrALAZINE 50 milliGRAM(s) Oral three times a day  levothyroxine 50 MICROGram(s) Oral daily  metoprolol succinate  milliGRAM(s) Oral daily  pantoprazole    Tablet 40 milliGRAM(s) Oral before breakfast  piperacillin/tazobactam IVPB.. 3.375 Gram(s) IV Intermittent every 8 hours    MEDICATIONS  (PRN):        I&O's Summary    30 Jul 2022 07:01  -  31 Jul 2022 07:00  --------------------------------------------------------  IN: 0 mL / OUT: 1500 mL / NET: -1500 mL        PHYSICAL EXAM:  Vital Signs Last 24 Hrs  T(C): 36.6 (31 Jul 2022 07:28), Max: 37.3 (30 Jul 2022 15:37)  T(F): 97.9 (31 Jul 2022 07:28), Max: 99.2 (30 Jul 2022 15:37)  HR: 66 (31 Jul 2022 07:28) (66 - 114)  BP: 151/73 (31 Jul 2022 07:28) (151/73 - 199/74)  BP(mean): --  RR: 18 (31 Jul 2022 07:28) (18 - 19)  SpO2: 95% (31 Jul 2022 07:28) (91% - 97%)    Parameters below as of 31 Jul 2022 04:38  Patient On (Oxygen Delivery Method): nasal cannula            CONSTITUTIONAL: NAD  HEENT: NC/AT, PERRL, no JVD  RESPIRATORY: CTA bilaterally, normal effort  CARDIOVASCULAR: RRR, S1/S2+, no m/g/r  ABDOMEN: +Ascites, non-tender, BS+  MUSCULOSKELETAL: No edema, cyanosis or deformities.  PSYCH: Calm, affect appropriate.  NEUROLOGY: Awake, alert, no focal neurological deficits.   SKIN: No rashes; no palpable lesions  VASC: Distal pulses palpable    LABS:                        10.6   5.22  )-----------( 252      ( 31 Jul 2022 04:17 )             35.1     07-31    136  |  95<L>  |  14.6  ----------------------------<  259<H>  4.8   |  30.0<H>  |  2.84<H>    Ca    8.8      31 Jul 2022 04:17    TPro  7.8  /  Alb  3.0<L>  /  TBili  0.7  /  DBili  x   /  AST  46<H>  /  ALT  21  /  AlkPhos  380<H>  07-31    PT/INR - ( 29 Jul 2022 22:56 )   PT: 14.0 sec;   INR: 1.20 ratio         PTT - ( 29 Jul 2022 22:56 )  PTT:31.3 sec  CARDIAC MARKERS ( 29 Jul 2022 22:56 )  x     / 0.47 ng/mL / 175 U/L / x     / 4.7 ng/mL          Culture - Blood (collected 28 Jul 2022 15:55)  Source: .Blood Blood-Venous  Preliminary Report (30 Jul 2022 02:02):    No growth to date.    Culture - Blood (collected 28 Jul 2022 15:52)  Source: .Blood Blood-Venous  Gram Stain (29 Jul 2022 14:04):    Growth in anaerobic bottle: Gram Positive Rods  Preliminary Report (29 Jul 2022 14:04):    Growth in anaerobic bottle: Gram Positive Rods    ***Blood Panel PCR results on this specimen are available    approximately 3 hours after the Gram stain result.***    Gram stain, PCR, and/or culture results may not always    correspond due to difference in methodologies.    ************************************************************    This PCR assay was performed by multiplex PCR. This    Assay tests for 66 bacterial and resistance gene targets.    Please refer to the [facility] test directory    at [email or web address] for details.  Organism: Blood Culture PCR (29 Jul 2022 16:10)  Organism: Blood Culture PCR (29 Jul 2022 16:10)      CAPILLARY BLOOD GLUCOSE      POCT Blood Glucose.: 256 mg/dL (31 Jul 2022 07:50)  POCT Blood Glucose.: 297 mg/dL (30 Jul 2022 22:00)        RADIOLOGY & ADDITIONAL TESTS:  Results Reviewed:   Imaging Personally Reviewed:  Electrocardiogram Personally Reviewed:

## 2022-07-31 NOTE — PROGRESS NOTE ADULT - ASSESSMENT
63yo female with PMH of ESRD on HD (M/W/F), CAD, CHFrEF (LVEF 40-45%), cirrhosis (?VELA, recurrent ascites requiring intermittent paracentesis) pAFib not on ac 2/2 GIB, DM2, chronic anemia, HTN, HLD called back to ED for positive blood with gram positive rods.     Bacteremia  - BCx (6/28) - one set gram positive rods  - On Zosyn  - Follow repeat Cx, ID once resulted.  - Monitor WBC, fever curve.    PNA  - CT with suspected pneumonia  - Continue Zosyn    ESRD on HD  - HD today    B/l LE pain   -Duplex LE ordered     HTN  -resumed home meds     CAD  -cont ASA, statin     DVT ppx  heparin    63yo female with PMH of ESRD on HD (M/W/F), CAD, CHFrEF (LVEF 40-45%), cirrhosis (?VELA, recurrent ascites requiring intermittent paracentesis) pAFib not on ac 2/2 GIB, DM2, chronic anemia, HTN, HLD called back to ED for positive blood with gram positive rods.     Bacteremia  - BCx (6/28) - one set gram positive rods  - On Zosyn  - Follow repeat Cx, ID once resulted.  - Monitor WBC, fever curve.    PNA  - CT with suspected pneumonia  - Continue Zosyn    ESRD on HD  - HD today    B/l LE pain   -Duplex LE - negative    HTN  -resumed home meds     CAD  -cont ASA, statin     DM2  - Hold home oral medications  - BGM w SS, FS  - Carb consistent diet    DVT ppx: heparin    63yo female with PMH of ESRD on HD (M/W/F), CAD, CHFrEF (LVEF 40-45%), cirrhosis (?VELA, recurrent ascites requiring intermittent paracentesis) pAFib not on ac 2/2 GIB, DM2, chronic anemia, HTN, HLD called back to ED for positive blood with gram positive rods.     Gram + Rodney Bacteremia  - BCx (6/28) - one set gram positive rods  - On Zosyn  - Follow repeat Cx, ID once resulted.  - Monitor WBC, fever curve.    PNA  - CT with possible PNA  - Continue Zosyn    ESRD on HD  - HD per Renal    B/l LE pain   - Duplex LE - negative    HTN  - Continue home medications    CAD  - Continue ASA, Statin    DM2  - Hold home oral medications  - BGM w SS, FS  - Carb consistent diet    DVT ppx: heparin    61yo female with PMH of ESRD on HD (M/W/F), CAD, CHFrEF (LVEF 40-45%), cirrhosis (?VELA, recurrent ascites requiring intermittent paracentesis) pAFib not on ac 2/2 GIB, DM2, chronic anemia, HTN, HLD called back to ED for positive blood with gram positive rods.     Gram + Rodney Bacteremia  - BCx (6/28) - one set gram positive rods  - On Zosyn  - Follow repeat Cx, consider ID consult once resulted.  - Monitor WBC, fever curve.    PNA  - CT with possible PNA  - Continue Zosyn    ESRD on HD  - HD per Renal    B/l LE pain   - Duplex LE - negative    HTN  - Continue home medications    CAD  - Continue ASA, Statin    DM2  - Hold home oral medications  - BGM w SS, FS  - Carb consistent diet    DVT ppx: heparin

## 2022-07-31 NOTE — PROGRESS NOTE ADULT - ASSESSMENT
61yo female with PMH of ESRD on HD (M/W/F), CAD, CHFrEF (LVEF 40-45%), cirrhosis (?VELA, recurrent ascites requiring intermittent paracentesis) pAFib not on ac 2/2 GIB, DM2, chronic anemia, HTN, HLD  Pt was called back to ED for positive blood Cx with gram positive rods.     ESRD   Missed HD on Friday  s/p HD on Sat   Consent obtained    Bacteremia + Blood Cx on Abx- Zosyn  Fever 100.5 at admit    HTN BP not ideal inc Hydralazine to 50 mg TID done yest--> watch     Will follow   63yo female with PMH of ESRD on HD (M/W/F), CAD, CHFrEF (LVEF 40-45%), cirrhosis (?VELA, recurrent ascites requiring intermittent paracentesis) pAFib not on ac 2/2 GIB, DM2, chronic anemia, HTN, HLD  Pt was called back to ED for positive blood Cx with gram positive rods.     ESRD   Missed HD on Friday  s/p HD on Sat   Consent obtained  Will arrange HD fro Mon to keep on schedule    Bacteremia + Blood Cx on Abx- Zosyn  Fever 100.5 at admit    HTN BP not ideal inc Hydralazine to 50 mg TID done yest--> watch     Will follow

## 2022-07-31 NOTE — PROGRESS NOTE ADULT - SUBJECTIVE AND OBJECTIVE BOX
NEPHROLOGY INTERVAL HPI/OVERNIGHT EVENTS:    Examined  distended abd + ascites  No dyspnea    MEDICATIONS  (STANDING):  amLODIPine   Tablet 10 milliGRAM(s) Oral daily  aspirin enteric coated 81 milliGRAM(s) Oral daily  atorvastatin 40 milliGRAM(s) Oral at bedtime  calcium acetate 667 milliGRAM(s) Oral three times a day with meals  heparin   Injectable 5000 Unit(s) SubCutaneous every 12 hours  hydrALAZINE 50 milliGRAM(s) Oral three times a day  levothyroxine 50 MICROGram(s) Oral daily  metoprolol succinate  milliGRAM(s) Oral daily  pantoprazole    Tablet 40 milliGRAM(s) Oral before breakfast  piperacillin/tazobactam IVPB.. 3.375 Gram(s) IV Intermittent every 8 hours    MEDICATIONS  (PRN):      Allergies    eggs (Anaphylaxis)  No Known Drug Allergies    Intolerances        Vital Signs Last 24 Hrs  T(C): 36.6 (31 Jul 2022 07:28), Max: 37.3 (30 Jul 2022 15:37)  T(F): 97.9 (31 Jul 2022 07:28), Max: 99.2 (30 Jul 2022 15:37)  HR: 66 (31 Jul 2022 07:28) (66 - 114)  BP: 151/73 (31 Jul 2022 07:28) (151/73 - 199/74)  BP(mean): --  RR: 18 (31 Jul 2022 07:28) (18 - 19)  SpO2: 95% (31 Jul 2022 07:28) (91% - 97%)    Parameters below as of 31 Jul 2022 04:38  Patient On (Oxygen Delivery Method): nasal cannula      Daily     Daily     PHYSICAL EXAM:    GENERAL: NAD  HEAD: NCAT  EYES: EOMI  NECK: Supple  NERVOUS SYSTEM:  Alert & Oriented X3  CHEST/LUNG: Clear to percussion bilaterally  HEART: Regular rate and rhythm  ABDOMEN: Soft, Nontender, + distention   EXTREMITIES: trace LE edema    LABS:                        10.6   5.22  )-----------( 252      ( 31 Jul 2022 04:17 )             35.1     07-31    136  |  95<L>  |  14.6  ----------------------------<  259<H>  4.8   |  30.0<H>  |  2.84<H>    Ca    8.8      31 Jul 2022 04:17    TPro  7.8  /  Alb  3.0<L>  /  TBili  0.7  /  DBili  x   /  AST  46<H>  /  ALT  21  /  AlkPhos  380<H>  07-31    PT/INR - ( 29 Jul 2022 22:56 )   PT: 14.0 sec;   INR: 1.20 ratio         PTT - ( 29 Jul 2022 22:56 )  PTT:31.3 sec            RADIOLOGY & ADDITIONAL TESTS:   Unknown if ever smoked

## 2022-08-01 NOTE — DISCHARGE NOTE PROVIDER - NSDCCPCAREPLAN_GEN_ALL_CORE_FT
PRINCIPAL DISCHARGE DIAGNOSIS  Diagnosis: Positive blood culture  Assessment and Plan of Treatment: Repeat BCx negative, likely contaminant. Take medications as prescribed. Follow up with Primary Care.      SECONDARY DISCHARGE DIAGNOSES  Diagnosis: End stage renal failure on dialysis  Assessment and Plan of Treatment: HD resumed.    Diagnosis: Positive blood culture  Assessment and Plan of Treatment:

## 2022-08-01 NOTE — PHARMACOTHERAPY INTERVENTION NOTE - NSPHARMCOMMASP
ASP - Renal dose adjustment Trinh Astorga (DO)  Family Medicine  [address] [place] [address]  Phone: [phone]  Fax: [phone]  Follow Up Time: 1 week

## 2022-08-01 NOTE — DISCHARGE NOTE PROVIDER - ATTENDING DISCHARGE PHYSICAL EXAMINATION:
Vital Signs Last 24 Hrs  T(F): 97.7 (01 Aug 2022 08:45), Max: 98.5 (31 Jul 2022 19:19)  HR: 65 (01 Aug 2022 08:45) (65 - 98)  BP: 154/74 (01 Aug 2022 08:45) (112/65 - 154/74)  RR: 18 (01 Aug 2022 08:45) (18 - 18)  SpO2: 94% (01 Aug 2022 08:45) (94% - 96%)    Physical Exam:  Constitutional: NAD  HEENT: NC/AT, PERRL, EOMI, trachea midline, no JVD  Respiratory: CTA bilaterally, symmetrical chest rise  Cardiovascular: RRR, no m/g/r  Gastrointestinal: Soft, NT/ND, BS+  Vascular: 2+ peripheral pulses  Neurological: A/O x 3, no focal neurological deficits  Psych: Fair mood/affect  Musculoskeletal: No edema, cyanosis, deformities. ROM normal  Skin: No obvious rash, lesions. No jaundice.

## 2022-08-01 NOTE — DISCHARGE NOTE PROVIDER - HOSPITAL COURSE
61yo female with PMH of ESRD on HD (M/W/F), CAD, CHFrEF (LVEF 40-45%), cirrhosis (?VELA, recurrent ascites requiring intermittent paracentesis) pAFib not on ac 2/2 GIB, DM2, chronic anemia, HTN, HLD called back to ED for positive blood with gram positive rods. One set positive. Patinet empirically started on Zosyn for possible PNA seen on CT. BCx were repeated and returned with NGTD. Neprhology was consulted and HD was resumed while inpatient. Patient currently asymptomatic and requesting to go home. Pt no longer requires inpt hospitalization and is stable for discharge.,

## 2022-08-01 NOTE — DISCHARGE NOTE PROVIDER - NSDCFUSCHEDAPPT_GEN_ALL_CORE_FT
Lindsay Shell  [facility]  GASTR[address]  Scheduled Appointment: 08/18/2022

## 2022-08-01 NOTE — DISCHARGE NOTE NURSING/CASE MANAGEMENT/SOCIAL WORK - NSDCPEFALRISK_GEN_ALL_CORE
For information on Fall & Injury Prevention, visit: [email or web address] OR  [email or web address] OR  https://www.cdc.gov/steadi/patient.html

## 2022-08-01 NOTE — DISCHARGE NOTE NURSING/CASE MANAGEMENT/SOCIAL WORK - PATIENT PORTAL LINK FT
You can access the FollowMyHealth Patient Portal offered by [facility] by registering at the following website: [email or web address]. By joining [employer]’s FollowMyHealth portal, you will also be able to view your health information using other applications (apps) compatible with our system.

## 2022-08-01 NOTE — DISCHARGE NOTE PROVIDER - NSDCMRMEDTOKEN_GEN_ALL_CORE_FT
alendronate 70 mg oral tablet: 1 tab(s) orally once a week  amLODIPine 10 mg oral tablet: 1 tab(s) orally once a day  amoxicillin-clavulanate 875 mg-125 mg oral tablet: 1 tab(s) orally 2 times a day   aspirin 81 mg oral delayed release tablet: 1 tab(s) orally once a day  atorvastatin 40 mg oral tablet: 1 tab(s) orally once a day  calcium acetate 667 mg oral tablet: 1 tab(s) orally 3 times a day   hydrALAZINE 25 mg oral tablet: 1 tab(s) orally 3 times a day  levothyroxine 50 mcg (0.05 mg) oral tablet: 1 tab(s) orally once a day  metoprolol succinate 100 mg oral tablet, extended release: 1 tab(s) orally once a day  Protonix 20 mg oral delayed release tablet: 1 tab(s) orally once a day

## 2022-08-01 NOTE — PROGRESS NOTE ADULT - ASSESSMENT
61yo female with PMH of ESRD on HD (M/W/F), CAD, CHFrEF (LVEF 40-45%), cirrhosis (?VELA, recurrent ascites requiring intermittent paracentesis) pAFib not on ac 2/2 GIB, DM2, chronic anemia, HTN, HLD  Pt was called back to ED for positive blood Cx with gram positive rods.     ESRD   Missed HD on Friday  s/p HD on Sat   Consent obtained  Will arrange HD today to keep on schedule    Bacteremia + Blood Cx on Abx- On Zosyn  Fever 100.5 at admit  Fever curve better    HTN BP not ideal increased  Hydralazine to 50 mg TID --> watch     Will follow

## 2022-08-01 NOTE — PROGRESS NOTE ADULT - SUBJECTIVE AND OBJECTIVE BOX
NEPHROLOGY INTERVAL HPI/OVERNIGHT EVENTS:    Examined  Feeling better  No distress    MEDICATIONS  (STANDING):  amLODIPine   Tablet 10 milliGRAM(s) Oral daily  aspirin enteric coated 81 milliGRAM(s) Oral daily  atorvastatin 40 milliGRAM(s) Oral at bedtime  calcium acetate 667 milliGRAM(s) Oral three times a day with meals  dextrose 5%. 1000 milliLiter(s) (50 mL/Hr) IV Continuous <Continuous>  dextrose 5%. 1000 milliLiter(s) (100 mL/Hr) IV Continuous <Continuous>  dextrose 50% Injectable 12.5 Gram(s) IV Push once  dextrose 50% Injectable 25 Gram(s) IV Push once  dextrose 50% Injectable 25 Gram(s) IV Push once  glucagon  Injectable 1 milliGRAM(s) IntraMuscular once  heparin   Injectable 5000 Unit(s) SubCutaneous every 12 hours  hydrALAZINE 50 milliGRAM(s) Oral three times a day  insulin lispro (ADMELOG) corrective regimen sliding scale   SubCutaneous three times a day before meals  levothyroxine 50 MICROGram(s) Oral daily  metoprolol succinate  milliGRAM(s) Oral daily  pantoprazole    Tablet 40 milliGRAM(s) Oral before breakfast  piperacillin/tazobactam IVPB.. 3.375 Gram(s) IV Intermittent every 12 hours  sodium zirconium cyclosilicate 5 Gram(s) Oral once    MEDICATIONS  (PRN):  dextrose Oral Gel 15 Gram(s) Oral once PRN Blood Glucose LESS THAN 70 milliGRAM(s)/deciliter      Allergies    eggs (Anaphylaxis)  No Known Drug Allergies    Intolerances        Vital Signs Last 24 Hrs  T(C): 36.5 (01 Aug 2022 08:45), Max: 36.9 (31 Jul 2022 19:19)  T(F): 97.7 (01 Aug 2022 08:45), Max: 98.5 (31 Jul 2022 19:19)  HR: 65 (01 Aug 2022 08:45) (65 - 71)  BP: 154/74 (01 Aug 2022 08:45) (112/65 - 154/74)  BP(mean): --  RR: 18 (01 Aug 2022 08:45) (18 - 18)  SpO2: 94% (01 Aug 2022 08:45) (94% - 96%)    Parameters below as of 01 Aug 2022 08:45  Patient On (Oxygen Delivery Method): nasal cannula  O2 Flow (L/min): 2      PHYSICAL EXAM:  GENERAL: NAD  HEAD: NCAT  EYES: EOMI  NECK: Supple  NERVOUS SYSTEM:  Alert & Oriented X3  CHEST/LUNG: Clear to percussion bilaterally  HEART: Regular rate and rhythm  ABDOMEN: Soft, Nontender, + distention   EXTREMITIES: trace LE edema    LABS:                        11.1   5.30  )-----------( 287      ( 01 Aug 2022 03:11 )             37.0     08-01    132<L>  |  93<L>  |  27.0<H>  ----------------------------<  152<H>  5.7<H>   |  26.0  |  3.68<H>    Ca    9.1      01 Aug 2022 03:11    TPro  7.5  /  Alb  2.9<L>  /  TBili  0.6  /  DBili  x   /  AST  31  /  ALT  16  /  AlkPhos  323<H>  08-01                RADIOLOGY & ADDITIONAL TESTS:

## 2022-08-09 NOTE — HISTORY OF PRESENT ILLNESS
[FreeTextEntry1] : JAROCHO MORALES is being seen for a Paracentesis procedure visit, diagnosis of ascites. \par \par PRE CALL PRIOR TO APPOINTMENT: Keshia- (daughter) [phone]\par \par Diagnosis: Ascites \par \par COVID-19 SWAB: Advised to go \par \par RX on file: YES\par \par Referring MD: Fredy Mandujano \par \par Clotting or Bleeding disorders: patient denies \par \par PPM / Defibrillator: patient denies \par \par NPO status advised: yes\par \par History of fall: patient denies / walks with assistance \par \par Assistant device for walking: yes\par \par  home: daughter Keshia\par \par Blood Thinners: patient denies \par \par Prescribing MD agree to have blood thinner medication held: N/A\par \par Capacity to make decisions: Yes\par \par HCP: yes daughter \par \par DNR: NO, patient denies \par \par Person contact for Pre-Call: \par \par --------------------------------------------------------------------------------------------------------\par Nidhi- Operative Assessment: (Day of Procedure)\par \par NPO status: yes\par \par Falls risk: yes, yellow bracelet on \par \par Labs: IN CHART \par \par IR MD:Leno Mahoney \par \par Urine Pregnancy: N/A\par \par PRE-OP instructions: YES\par \par POST-OP teaching initiated: YES\par \par Allergy bracelet on: n/a\par \par Antibiotic given: NO \par \par

## 2022-08-10 NOTE — PROGRESS NOTE ADULT - PROBLEM SELECTOR PROBLEM 5
No new care gaps identified.  [facility] Embedded Care Gaps. Reference number: [id number]. 8/10/2022   5:30:05 PM CDT   Essential hypertension Anemia

## 2022-08-12 NOTE — CONSULT NOTE ADULT - PROVIDER SPECIALTY LIST ADULT
Patient on propofol/no anesthesia administered
Cardiology
Palliative Care
Nephrology
no anesthesia administered

## 2022-08-18 PROBLEM — Z09 FOLLOW-UP EXAM: Status: ACTIVE | Noted: 2022-01-01

## 2022-08-18 NOTE — REASON FOR VISIT
[Follow-Up: _____] : a [unfilled] follow-up visit [Other: ______] : provided by TOMMY [FreeTextEntry1] : ascites [Interpreters_FullName] : Shirley [TWNoteComboBox1] : Bahraini

## 2022-08-18 NOTE — ASSESSMENT
[FreeTextEntry1] : JAROCHO MORALES is a 62 year old  female with a PMH significant for ESRD on HD (M/W/F), CAD, CHFrEF (LVEF 40-45%), possible cirrhosis (recurrent ascites requiring intermittent paracentesis), paroxysmal AFib (currently not on AC 2/2 hx of prior GIB), T2DM, HTN, HLD, and hypothyroid.\par \par \par \par Continue paracentesis q 2-3 weeks.\par No diuretics, pt is anuric.\par Liver enzymes stable.\par Will need EGD.\par Follow-up in 4 to 6 weeks

## 2022-08-18 NOTE — PHYSICAL EXAM
[Abdominal  Ascites] : ascites [General Appearance - Alert] : alert [General Appearance - In No Acute Distress] : in no acute distress [Sclera] : the sclera and conjunctiva were normal [Neck Appearance] : the appearance of the neck was normal [Neck Cervical Mass (___cm)] : no neck mass was observed [Jugular Venous Distention Increased] : there was no jugular-venous distention [Thyroid Diffuse Enlargement] : the thyroid was not enlarged [Thyroid Nodule] : there were no palpable thyroid nodules [Abdomen Soft] : soft [Skin Color & Pigmentation] : normal skin color and pigmentation [Skin Turgor] : normal skin turgor [] : no rash [Oriented To Time, Place, And Person] : oriented to person, place, and time [Impaired Insight] : insight and judgment were intact [Affect] : the affect was normal [Scleral Icterus] : No Scleral Icterus [Spider Angioma] : No spider angioma(s) were observed [Jaundice] : No jaundice [Palmar Erythema] : no Palmar Erythema [Depression] : no depression [Hallucinations] : ~T no ~M hallucinations [Delusions] : no ~T delusions [Suicidal Ideation] : no suicidal ideation [FreeTextEntry1] : uses cane

## 2022-08-18 NOTE — HISTORY OF PRESENT ILLNESS
[Fatigue] : denies fatigue [Malaise] : denies malaise [Fever] : denies fever [Diffuse Joint Pain (Arthralgias)] : denies arthralgias [Muscle Aches, Generalized (Myalgias)] : denies myalgias [Urine Tests Nonspecific Abnormal Findings Biliuria] : denies dark urine [Light Colored Bowel Movement (Acholic Stools)] : denies pale stools [Insomnia] : denies insomnia [Skin: Rash] : denies rash [Itching (Pruritus)] : denies pruritus [Shortness Of Breath] : denies shortness of breath [de-identified] : JAROCHO MORALES is a 62 year old  female with a PMH significant for ESRD on HD (M/W/F), CAD, CHFrEF (LVEF 40-45%), possible cirrhosis (recurrent ascites requiring intermittent paracentesis), paroxysmal AFib (currently not on AC 2/2 hx of prior GIB), T2DM, HTN, HLD, and hypothyroid. She is here today for follow up.\par Interim hospitalization from where she was discharged on 2 August.\par Labs from second August reviewed no acute changes and LFTs.\par Patient had her regularly scheduled paracentesis on 9 August 2022 and we have called by the interventional radiologist Dr. Alonzo who indicated that he had noticed a mass arising out of the peritoneum(not liver) which needed to be evaluated.\par We called the patient and explained this to her and recommended that her primary care physician follow this up.\par I discussed this with her daughter today during the clinical visit as well .  She states she is trying to connect with the PCP to do the needful.\par Of note CT abdomen done on June 2022 did not comment on any such mass but was limited by being only CT of the abdomen.  CT pelvis was not included.\par \par \par \par June 16 visit\par Pt was recently admitted at [facility] for chest pain and tachycardia. Hospital course as follows: Pt presented to ED sent by PCP on 5/21/22 for chest pain & tachycardia x1 day. Pt was previously discharged 5/13 for hemoperitoneum after paracentesis (performed every 2 weeks by IR) for recurrent ascites. Hospital course complicated by AFib with RPR, possible infection requiring iv antibiotics. CT showed resolving hemoperitoneum but persistent ascites. Diagnostic paracentesis not performed as pt was afebrile and without leukocytosis, IV abx completed. Discharged home on 5/25/22.\par \par Most recent paracentesis on 6/14/22, 2300 ml "merlot" fluid drained. Next scheduled for 6/28/22.\par \par .

## 2022-08-26 NOTE — ED ADULT NURSE NOTE - HOW OFTEN DO YOU HAVE A DRINK CONTAINING ALCOHOL?
Never
See your Primary Doctor and Neurologist next week for follow up -- call to discuss.    Stay well hydrated, eat regular healthy meals, get plenty of rest, continue current medications.    Use Prednisone and Acyclovir and Artificial Tears as directed for Bell's Palsy -- see medication warnings.    Start Metformin as directed for diabetes -- see medication warnings.    See BELL'S PALSY and DIABETES information and return instructions given to you.    Seek immediate medical care for new/worsening symptoms/concerns.

## 2022-08-30 NOTE — REASON FOR VISIT
[Source: ______] : History obtained from [unfilled] [FreeTextEntry1] : Diagnosis of ascites [Interpreters_IDNumber] : [id number] [Interpreters_FullName] : Meghana [TWNoteComboBox1] : Bulgarian

## 2022-08-30 NOTE — HISTORY OF PRESENT ILLNESS
[FreeTextEntry1] : PRE CALL PRIOR TO APPOINTMENT: Keshia- (daughter) [phone]\par \par Diagnosis: Ascites \par \par COVID-19 SWAB: Advised to go \par \par RX on file: YES\par \par Referring MD: Ferdy Mandujano \par \par Clotting or Bleeding disorders: patient denies \par \par PPM / Defibrillator: patient denies \par \par NPO status advised: yes\par \par History of fall: patient denies / walks with assistance \par \par Assistant device for walking: yes\par \par  home: daughter Keshia\par \par Blood Thinners: patient denies \par \par Prescribing MD agree to have blood thinner medication held: N/A\par \par Capacity to make decisions: Yes\par \par HCP: yes daughter \par \par DNR: NO, patient denies \par \par Person contact for Pre-Call: \par \par --------------------------------------------------------------------------------------------------------\par Nidhi- Operative Assessment: (Day of Procedure)\par \par NPO status: yes\par \par Falls risk: yes, yellow bracelet on \par \par Labs: IN CHART \par \par IR MD:Leno Mahoney \par \par Urine Pregnancy: N/A\par \par PRE-OP instructions: YES\par \par POST-OP teaching initiated: YES\par \par Allergy bracelet on: n/a\par \par Antibiotic given: NO \par

## 2022-08-30 NOTE — ASSESSMENT
Bedside and Verbal shift change report given to Arben Gatica RN (oncoming nurse) by Pedro Rosa RN (offgoing nurse). Report included the following information SBAR, Kardex, MAR and Recent Results.     SITUATION:    Code Status: Full Code   Reason for Admission: Chronic respiratory failure ([facility] [J96.10]    [facility] day: 16   Problem List:       Hospital Problems  Date Reviewed: 7/10/2019          Codes Class Noted POA    Panic attacks ICD-10-CM: F41.0  ICD-9-CM: 300.01  Unknown Yes        Chronic respiratory failure ([facility] ICD-10-CM: J96.10  ICD-9-CM: 518.83  6/25/2019 Yes        Impaired mobility and ADLs ICD-10-CM: Z74.09  ICD-9-CM: 799.89  6/25/2019 Yes        Dysphagia ICD-10-CM: R13.10  ICD-9-CM: 787.20  6/25/2019 Yes    Overview Signed 7/5/2019 10:50 AM by Huber Diaz MD     S/P Thyroidectomy (4/4/2019 - Dr. Arash Coronel)             Hypoxemia requiring supplemental oxygen ICD-10-CM: R09.02, Z99.81  ICD-9-CM: 799.02  6/25/2019 Yes        Anxiety ICD-10-CM: F41.9  ICD-9-CM: 300.00  6/25/2019 Yes        Status post insertion of percutaneous endoscopic gastrostomy (PEG) tube ([facility] ICD-10-CM: Z93.1  ICD-9-CM: V44.1  6/20/2019 Yes    Overview Addendum 7/5/2019 11:39 AM by Huber Diaz MD     S/P PEG tube insertion (6/20/2019) - Dr. Arash Coronel)             History of tracheostomy ICD-10-CM: Z98.890  ICD-9-CM: V44.0  6/3/2019 Yes    Overview Signed 7/5/2019 10:44 AM by Huber Diaz MD     S/P Tracheostomy (6/3/2019 - Dr. Arash Coronel)             History of thyroidectomy ICD-10-CM: F27.937  ICD-9-CM: V15.29  4/4/2019 Yes    Overview Signed 7/5/2019 10:45 AM by Huber Diaz MD     S/P Thyroidectomy (4/4/2019 - Dr. Arash Coronel)             * (Principal) Bilateral vocal cord paralysis ICD-10-CM: J38.02  ICD-9-CM: 478.33  4/4/2019 Yes    Overview Signed 7/11/2019  9:28 AM by Huber Diaz MD     Bilateral vocal cord paralysis status post thyroidectomy (4/4/2019 - Dr. Aleshia Villeda [[email or web address]] : IR Post Procedure Note\par \par Procedure: Paracentesis\par \par Diagnosis: Ascites\par \par Contrast: None\par \par Anesthesia: 1% Lidocaine subcutaneous\par \par Estimated Blood Loss: Less than 10cc\par \par Complications: No Immediate Complications\par \par Findings: 3750cc of clear yellow fluid drained from abdomen via 5F Yueh needle. No acute complications\par  Riblet) with residual dysphagia and dysarthria; S/P Tracheostomy (6/3/2019 - Dr. Yadira White); S/P PEG tube insertion (6/20/2019) - Dr. Yadira White)             Type 2 diabetes mellitus with diabetic neuropathy, with long-term current use of insulin (HCC) (Chronic) ICD-10-CM: E11.40, Z79.4  ICD-9-CM: 250.60, 357.2, V58.67  Unknown Yes        Depression (Chronic) ICD-10-CM: F32.9  ICD-9-CM: 424  8/24/2018 Yes        Acquired hypothyroidism (Chronic) ICD-10-CM: E03.9  ICD-9-CM: 244.9  5/17/2017 Yes        Hypertensive heart disease without heart failure (Chronic) ICD-10-CM: I11.9  ICD-9-CM: 402.90  5/17/2017 Yes              BACKGROUND:    Past Medical History:   Past Medical History:   Diagnosis Date    Acquired hypothyroidism 5/17/2017    Anxiety 6/25/2019    Bilateral vocal cord paralysis 4/4/2019    Bilateral vocal cord paralysis status post thyroidectomy (4/4/2019 - Dr. Yadira White) with residual dysphagia and dysarthria; S/P Tracheostomy (6/3/2019 - Dr. Yadira White); S/P PEG tube insertion (6/20/2019) - Dr. Yadira White)    Chronic back pain     Lower back pain    Depression     Diabetic neuropathy associated with type 2 diabetes mellitus ([facility]     Dysphagia 6/25/2019    S/P Thyroidectomy (4/4/2019 - Dr. Yadira White)    History of asthma     History of heart failure     chronic diastolic heart failure    History of vitamin D deficiency 8/28/2017    Hypertensive heart disease without heart failure 5/17/2017    Hypoxemia requiring supplemental oxygen 6/25/2019    Insomnia     Left ear hearing loss     pt states nerve damage--- 25% hearing loss, described as \"muffled\"    Memory difficulty     Moderate persistent asthma     Obesity, Class II, BMI 35-39.9 11/11/2016    Obstructive sleep apnea 11/6/2015    Panic attacks     Ringing of ears     Type 2 diabetes mellitus with diabetic neuropathy, with long-term current use of insulin (HCC)     Vertigo          Patient taking anticoagulants yes     ASSESSMENT:    Changes in Assessment Throughout Shift: none     Patient has Central Line: no Reasons if yes:    Patient has Barker Cath: no Reasons if yes:       Last Vitals:     Vitals:    07/11/19 0055 07/11/19 0736 07/11/19 1603 07/11/19 2053   BP:  130/67 137/77 120/64   Pulse:  63 64 66   Resp:  18 19 18   Temp:  97.1 °F (36.2 °C) 97.1 °F (36.2 °C) 98.9 °F (37.2 °C)   SpO2: 98% 98% 98% 96%   Weight:       Height:            IV and DRAINS (will only show if present)   [REMOVED] Peripheral IV 06/30/19 Left Antecubital-Site Assessment: Clean, dry, & intact  Peripheral IV 07/03/19 Anterior; Left Forearm-Site Assessment: Clean, dry, & intact  [REMOVED] Peripheral IV 06/25/19 Anterior;Proximal;Right Antecubital-Site Assessment: Dry, Intact  PEG/Gastrostomy Tube 06/20/19-Site Assessment: Clean, dry, & intact  [REMOVED] Airway - Tracheostomy Tube 06/03/19 Portex-Site Assessment: Clean, dry, & intact     WOUND (if present)   Wound Type:         Dressing present Dressing Present : No   Wound Concerns/Notes:  none     PAIN    Pain Assessment    Pain Intensity 1: 0 (07/12/19 0400)    Pain Location 1: Head    Pain Intervention(s) 1: Medication (see MAR)    Patient Stated Pain Goal: 0  o Interventions for Pain:  Pain medication    o Intervention effective: yes  o Time of last intervention: see MAR   o Reassessment Completed: yes      Last 3 Weights:  Last 3 Recorded Weights in this Encounter    06/26/19 1430 07/03/19 1852   Weight: 122.2 kg (269 lb 8 oz) 103.9 kg (229 lb)     Weight change:      INTAKE/OUPUT    Current Shift: 07/11 1901 - 07/12 0700  In: 620   Out: -     Last three shifts: 07/10 0701 - 07/11 1900  In: 2912   Out: -      LAB RESULTS     Recent Labs     07/11/19 0613   HGB 11.7*   HCT 36.0        No results for input(s): NA, K, GLU, BUN, CREA, CA, MG, INR in the last 72 hours.     No lab exists for component: PT, PTT, INREXT, INREXT    RECOMMENDATIONS AND DISCHARGE PLANNING 1. Pending tests/procedures/ Plan of Care or Other Needs: labs  2.      3. Discharge plan for patient and Needs/Barriers: TBD    4. Estimated Discharge Date: TBD Posted on Whiteboard in Patients Room: no      4. The patient's care plan was reviewed with the oncoming nurse. \"HEALS\" SAFETY CHECK      Fall Risk    Total Score: 3    Safety Measures: Safety Measures: Bed/Chair-Wheels locked, Bed in low position, Call light within reach    A safety check occurred in the patient's room between off going nurse and oncoming nurse listed above. The safety check included the below items  Area Items   H  High Alert Medications - Verify all high alert medication drips (heparin, PCA, etc.)   E  Equipment - Suction is set up for ALL patients (with chaitanya)  - Red plugs utilized for all equipment (IV pumps, etc.)  - WOWs wiped down at end of shift.  - Room stocked with oxygen, suction, and other unit-specific supplies   A  Alarms - Bed alarm is set for fall risk patients  - Ensure chair alarm is in place and activated if patient is up in a chair   L  Lines - Check IV for any infiltration  - Barker bag is empty if patient has a Barker   - Tubing and IV bags are labeled   S  Safety   - Room is clean, patient is clean, and equipment is clean. - Hallways are clear from equipment besides carts. - Fall bracelet on for fall risk patients  - Ensure room is clear and free of clutter  - Suction is set up for ALL patients (with chaitanya)  - Hallways are clear from equipment besides carts.    - Isolation precautions followed, supplies available outside room, sign posted     Flor Jones RN

## 2022-09-04 NOTE — H&P ADULT - HISTORY OF PRESENT ILLNESS
Patient is a 62y old  Female who presents with a chief complaint of hypertensive crisis/ hyperglycemia ~600/ fever TMax: 100.7 w vomiting, who was declining clinically w lethargy since in ED, CTB was obtained and extensive B/L IVH casting 3rd and 4th vents were noted. pt has been intubated shortly after CT for poor exam/ agonal breaths/ GCS 4.  per d/w son, Feliciano, unable to reach Keshia, daughter/ HCP, full code and all measures to be done, R/B?A of EVD eplained alongside possibility of worsening hemorrhage and he understood and consent obtained via phone.    - LOC   - trauma reported   ?Headache/ unable to obtain from pt/ per ER RN pt did not endorse headache   + Nausea /+ Vomiting  pt was dropped off to ED by granddaughter     Mode: AC/ CMV (Assist Control/ Continuous Mandatory Ventilation)  RR (machine): 16  TV (machine): 0.4  FiO2: 50  PEEP: 5  PIP: 16    Vital Signs Last 24 Hrs  T(C): 38.2 (04 Sep 2022 13:58), Max: 38.2 (04 Sep 2022 13:58)  T(F): 100.7 (04 Sep 2022 13:58), Max: 100.7 (04 Sep 2022 13:58)  HR: 73 (04 Sep 2022 17:36) (70 - 92)  BP: 134/71 (04 Sep 2022 17:36) (119/68 - 200/106)  BP(mean): --  RR: 16 (04 Sep 2022 17:36) (16 - 21)  SpO2: 100% (04 Sep 2022 17:36) (93% - 100%)    Parameters below as of 04 Sep 2022 17:36  Patient On (Oxygen Delivery Method): ventilator

## 2022-09-04 NOTE — DISCHARGE NOTE NURSING/CASE MANAGEMENT/SOCIAL WORK - NSDCPEFALRISK_GEN_ALL_CORE
For information on Fall & Injury Prevention, visit: [email or web address] OR  [email or web address] OR  https://www.cdc.gov/steadi/patient.html

## 2022-09-04 NOTE — ED PROVIDER NOTE - ATTENDING CONTRIBUTION TO CARE
Nicol: I performed a face to face evaluation of this patient and performed a full history and physical examination on the patient.  I agree with the resident's history, physical examination, and plan of the patient unless otherwise noted. My brief assessment is as follows: pmh as documented with vomiting at home and fever/hypretension. last dialyzed on friday. pt arrived with responsive to verbal/noxious stimuli but not answering questions. hypoxic ~91-92%. nl pulse, hypertense, no abd ttp or tension, chronic skin swelling to b/l LE. nl pupils with right subconjunctival hemorrhage. moving extremiteis to stimuli. sepsis though not 30ml/kg due to dialysis/fluid overload, abx. admission.

## 2022-09-04 NOTE — ED PROVIDER NOTE - OBJECTIVE STATEMENT
62F ESRD on HD (M/W/F), CAD, CHFrEF (LVEF 40-45%), cirrhosis (?VELA, recurrent ascites requiring intermittent paracentesis) pAFib not on ac 2/2 GIB, DM2, chronic anemia, HTN, HLD presents for N/V, fever, and unresponsiveness since earlier today per EMS. Pt was able to attend her dialysis on Friday. Hx otherwise limited due to pt unresponsiveness

## 2022-09-04 NOTE — DISCHARGE NOTE NURSING/CASE MANAGEMENT/SOCIAL WORK - PATIENT PORTAL LINK FT
You can access the FollowMyHealth Patient Portal offered by [facility] by registering at the following website: [email or web address]. By joining [email or web address]’s FollowMyHealth portal, you will also be able to view your health information using other applications (apps) compatible with our system.

## 2022-09-04 NOTE — H&P ADULT - NSHPLABSRESULTS_GEN_ALL_CORE
13.0   4.68  )-----------( 186      ( 04 Sep 2022 14:19 )             41.3     09-04    126<L>  |  86<L>  |  28.0<H>  ----------------------------<  533<HH>  4.6   |  24.0  |  3.88<H>    Ca    9.7      04 Sep 2022 14:19    TPro  9.2<H>  /  Alb  3.4  /  TBili  0.9  /  DBili  x   /  AST  42<H>  /  ALT  30  /  AlkPhos  591<H>  09-04    PT/INR - ( 04 Sep 2022 14:19 )   PT: 12.2 sec;   INR: 1.05 ratio    PTT - ( 04 Sep 2022 14:19 )  PTT:36.8 sec    LIVER FUNCTIONS - ( 04 Sep 2022 14:19 )  Alb: 3.4 g/dL / Pro: 9.2 g/dL / ALK PHOS: 591 U/L / ALT: 30 U/L / AST: 42 U/L / GGT: x             RADIOLOGY & ADDITIONAL STUDIES:  < from: CT Head No Cont (09.04.22 @ 16:21) >  ACC: [id number] EXAM:  CT BRAIN                        PROCEDURE DATE:  09/04/2022    INTERPRETATION:  CLINICAL INDICATION: Decreased responsiveness  TECHNIQUE: Axial CT scanning of the brain was obtained from the skull base to the vertexwithout the administration of intravenous contrast.   Reformatted coronal and sagittal images were subsequently obtained and reviewed.    COMPARISON: 4/8/2021    FINDINGS:  Large volume acute intraventricular hemorrhage resulting in mild hydrocephalus.  There is no CT evidence of acute transcortical infarct. Age-related involutional changes and chronic microvascular ischemic changes.  No acute intraparenchymal hemorrhage.  The visualized paranasal sinuses and mastoid air cells are clear.  The calvarium is intact.    IMPRESSION:  Large volume acute intraventricular hemorrhage resulting in mild hydrocephalus. Recommend CTA to exclude an anterior communicating artery aneurysm.  Findings were discussed by Dr. ADELA Wilcox with Dr.C. BIRCH on 9/4/2022 at 4:50 PM.  --- End of Report ---  LINDA WILCOX MD; Attending Radiologist  This document has been electronically signed. Sep  4 2022  4:56PM  < end of copied text >      < from: CT Abdomen and Pelvis No Cont (09.04.22 @ 16:22) >    IMPRESSION:  Interval worsening of the alveolar and consolidative airspace opacities involving the right lower lobe and middle lobe now extending to the upper lobe. Additional interstitial interlobular septal thickening. Findings are compatible with pulmonary edema and congestion, with concern for superimposed pneumonia in the appropriate clinical setting.  Cardiomegaly.  Moderate to large amount of ascites, slightly worsened from prior study.   Anasarca.  Splenomegaly. Unchanged prominent mediastinal and retroperitoneal adenopathy.    VERTEBRAL BODY ANALYSIS: Low bone density as described above, consider further workup for osteoporosis.  --- End of Report ---  SEBASTIEN LEON MD; Attending Radiologist  This document has been electronically signed. Sep  4 2022  4:55PM      I spent a total time of 95 mins with the patient at bedside of which more than 50% of time was spent on counseling/coordination of care, speaking w family and ED/NCCU team, gathering EVD supplies

## 2022-09-04 NOTE — CHART NOTE - NSCHARTNOTEFT_GEN_A_CORE
63 yo F with multiple co-morbidities, incl. ESRD on HD (MWF), presented with devastating IVH of unclear etiology; concern for underlying cerebral vascular malformation, for which our pt needs urgent CT angiogram with contrast.  Diagnosis of vascular lesion may potentially change our current management and affect prognosis.  Hence, contrast radiographic studies are medically necessary.

## 2022-09-04 NOTE — CONSULT NOTE ADULT - ASSESSMENT
GCS3 and intubated post eval per ED     per d/w Dr King:  EVD post DDAVP and 1 uPLTs as per Dr Jonathon shahid gtt for SBP< 160  neuro-checks q1 HR and NCCU admit  recommed Keppra 1G now and 500 q12 afterwards   HOB > 30 degrees  hold all AC/AP/ ASA or chemical DVT PPx  further plan as per NCCU/ ED

## 2022-09-04 NOTE — ED ADULT NURSE REASSESSMENT NOTE - NS ED NURSE REASSESS COMMENT FT1
pt unable to inform RN of whether she produces urine secondary to AMS. bladder scan showed > 380mL in bladder. straight cath attempted x2 unsuccessful. MD Camarena made aware.

## 2022-09-04 NOTE — ED PROVIDER NOTE - PHYSICAL EXAMINATION
General: Groaning, lying in bed, does not respond to voice  HEENT: Normocephalic, atraumatic. No scleral icterus or conjunctival injection. EOMI. Moist mucous membranes. Oropharynx clear.   Neck:. Soft and supple.   Cardiac: RRR, +S1/S2. No murmurs, rubs, gallops. Peripheral pulses 2+ and symmetric. No LE edema.  Resp: Diffuse crackles. No respiratory distress  Abd: Soft, non-distended. No observed pain on palpation. Old midline scar  : No overlying skin changes  Skin: No rashes, abrasions, or lacerations.  Neuro: Nonresponsive to voice, groaning.   Psych: unable to assess General: Groaning, lying in bed, does not respond to voice  HEENT: Normocephalic, atraumatic. Moist mucous membranes. Oropharynx clear.   Neck:. Soft and supple.   Cardiac: RRR, +S1/S2. No murmurs, rubs, gallops. Peripheral pulses 2+ and symmetric. No LE edema.  Resp: Diffuse crackles. No respiratory distress  Abd: Soft, non-distended. No observed pain on palpation. Old midline scar  : No overlying skin changes  Skin: No rashes, abrasions, or lacerations.  Neuro: Nonresponsive to voice, groaning.   Psych: unable to assess General: Groaning, lying in bed, does not respond to voice  HEENT: Normocephalic, atraumatic. PERRL. Moist mucous membranes. Oropharynx clear.   Neck:. Soft and supple.   Cardiac: RRR, +S1/S2. No murmurs, rubs, gallops. Peripheral pulses 2+ and symmetric. No LE edema.  Resp: Diffuse crackles. No respiratory distress  Abd: Soft, non-distended. No observed pain on palpation. Old midline scar  : No overlying skin changes  Skin: No rashes, abrasions, or lacerations.  Neuro: Groaning to voice. Moving all extremities symmetrically  Psych: Unable to assess

## 2022-09-04 NOTE — H&P ADULT - NS ATTEND AMEND GEN_ALL_CORE FT
Neurosurgery Attending Attestation:    Patient seen and examined at bedside. Agree with plan and note as documented above.    62-year-old woman with past medical history significant for hypertension, hyperlipidemia, ESRD on HD, OP, CHF, DM, paroxysmal A. fib, hypothyroid, anemia of chronic disease, cirrhosis, and recent admission for pneumonia who presents with acute onset loss of consciousness and presented to [facility] ED with 4 mm fixed pupils, weak corneals positive cough/gag, and extending in the uppers and trouble flexing the lowers.  CT head without contrast demonstrates likely left basal ganglia ICH with extensive IVH with casting of the right, third, and fourth ventricles.  Patient with elevated APTT. Plan for DDAVP and platelet administration followed by right frontal EVD placement for drainage of intraventricular blood.  Admission to NCCU, SBP less than 160, Keppra. Given patient's poor exam and significant bleed, unfortunately neurological prognosis is poor.    -Marlene King MD

## 2022-09-04 NOTE — H&P ADULT - ASSESSMENT
62y old  Female who presents with a chief complaint of hypertensive crisis/ hyperglycemia ~600/ fever TMax: 100.7 w vomiting, who was declining clinically w lethargy since in ED  CTB was obtained and reports Large volume acute intraventricular hemorrhage resulting in mild hydrocephalus.  pt has been intubated shortly after CT for poor exam/ agonal breaths/ GCS 4.    consent for Right EVD and 1U PLTs obtained from son, Feliciano, via phone   R/B/A explained and family wishes to proceed w all lifesaving measures that are offere, pt has been made aware of grave prognosis form NSx standpoint and prior to arrival symptoms of fevere/ sepsis w/u, hyperglycemia and hypertensive crisis     per d/w Dr King/ imaging review:  EVD post DDAVP 28g IV and 1 uPLTs as per Dr Jonathon shahid gtt for SBP< 160  neuro-checks q1 HR and NCCU admit  Keppra 1G now and 500 q12 afterwards   HOB > 30 degrees  hold all AC/AP/ ASA or chemical DVT PPx  further plan as per NCCU/ ED

## 2022-09-04 NOTE — CONSULT NOTE ADULT - CRITICAL CARE ATTENDING COMMENT
Pt is critically ill and at high risk of rapid deterioration/death due to abovementioned conditions.   Still requires critical care interventions - ongoing frequent evaluations, interventions and management adjustment by the Attending and ICU team, - and included review of relevant history, clinical examination, review of data and images, discussion of treatment with the multidisciplinary team and any consultants involved in this patient’s care.

## 2022-09-04 NOTE — PATIENT PROFILE ADULT - SURGICAL SITE DRAIN
Patient has a UTI and wants to know if she can have something call in for it.     Please call and advise.    no

## 2022-09-04 NOTE — ED PROVIDER NOTE - CLINICAL SUMMARY MEDICAL DECISION MAKING FREE TEXT BOX
62F hx ESRD (HD M/W/F), CHF, cirrhosis, CAD, AF (not on AC 2/2 GIB) presents for N/V, fever, unresponsiveness since earlier today. On arrival pt noted to be febrile with crackles noted throughout, c/f fluid overload vs pneumonia. Sepsis workup - labs, cultures, CXR, UA, broad spectrum abx. Will contact family for collateral 62F hx ESRD (HD M/W/F), CHF, cirrhosis, CAD, AF (not on AC 2/2 GIB) presents for N/V, fever, unresponsiveness since earlier today. On arrival pt noted to be febrile with crackles noted throughout, c/f fluid overload vs pneumonia. Sepsis workup - labs, cultures, CXR, UA, broad spectrum abx. CT head given change in mental status. Will contact family for collateral

## 2022-09-04 NOTE — CONSULT NOTE ADULT - SUBJECTIVE AND OBJECTIVE BOX
HPI:  62F ESRD on HD (M/W/F), CAD, CHFrEF (LVEF 40-45%), cirrhosis (?VELA, recurrent ascites requiring intermittent paracentesis) pAFib not on ac 2/2 GIB, DM2, chronic anemia, HTN, HLD presents for N/V, fever, and unresponsiveness since earlier today per EMS. Pt was able to attend her dialysis on Friday. Hx otherwise limited due to pt unresponsiveness.  Presented with hypertensive crisis/ hyperglycemia ~600/ fever TMax: 100.7 w vomiting, who was declining clinically w lethargy since in ED, CTB was obtained and extensive B/L IVH casting 3rd and 4th vents were noted. pt has been intubated shortly after CT for poor exam/ agonal breaths/ GCS 4. per d/w son, Feliciano, unable to reach [place], daughter/ HCP, full code and all measures to be done, R/B?A of EVD eplained alongside possibility of worsening hemorrhage and he understood and consent obtained via phone.    Vital Signs Last 24 Hrs  T(C): 38.2 (04 Sep 2022 13:58), Max: 38.2 (04 Sep 2022 13:58)  T(F): 100.7 (04 Sep 2022 13:58), Max: 100.7 (04 Sep 2022 13:58)  HR: 73 (04 Sep 2022 17:36) (70 - 92)  BP: 134/71 (04 Sep 2022 17:36) (119/68 - 200/106)  BP(mean): --  RR: 16 (04 Sep 2022 17:36) (16 - 21)  SpO2: 100% (04 Sep 2022 17:36) (93% - 100%)    Parameters below as of 04 Sep 2022 17:36  Patient On (Oxygen Delivery Method): ventilator (04 Sep 2022 18:00)    On admission, the patient was:  GCS: 4t.    VITALS:  T(C): , Max: 38.2 (09-04-22 @ 13:58)  HR:  (62 - 92)  BP:  (119/68 - 200/106)  ABP: --  RR:  (16 - 21)  SpO2:  (93% - 100%)  Wt(kg): --  Device: Revel, Mode: AC/ CMV (Assist Control/ Continuous Mandatory Ventilation), RR (machine): 16, TV (machine): 0.4, FiO2: 50, PEEP: 5, PIP: 16    LABS:  Na: 126 (09-04 @ 14:19)  K: 4.6 (09-04 @ 14:19)  Cl: 86 (09-04 @ 14:19)  CO2: 24.0 (09-04 @ 14:19)  BUN: 28.0 (09-04 @ 14:19)  Cr: 3.88 (09-04 @ 14:19)  Glu: 533(09-04 @ 14:19)    Hgb: 13.0 (09-04 @ 14:19)  Hct: 41.3 (09-04 @ 14:19)  WBC: 4.68 (09-04 @ 14:19)  Plt: 186 (09-04 @ 14:19)  PT: 12.2 (09-04 @ 14:19)  INR: 1.05 (09-04 @ 14:19)  aPTT: 36.8 (09-04 @ 14:19)    IMAGING:   Recent imaging studies were reviewed.    MEDICATIONS:  acetaminophen     Tablet .. 650 milliGRAM(s) Oral every 6 hours PRN  amLODIPine   Tablet 10 milliGRAM(s) Oral daily  atorvastatin 40 milliGRAM(s) Oral at bedtime  chlorhexidine 0.12% Liquid 15 milliLiter(s) Oral Mucosa every 12 hours  chlorhexidine 2% Cloths 1 Application(s) Topical daily  dextrose 50% Injectable 50 milliLiter(s) IV Push every 15 minutes  hydrALAZINE 25 milliGRAM(s) Oral three times a day  hydrALAZINE Injectable 10 milliGRAM(s) IV Push every 2 hours PRN  insulin regular Infusion 2 Unit(s)/Hr IV Continuous <Continuous>  labetalol Injectable 10 milliGRAM(s) IV Push every 2 hours PRN  levETIRAcetam  IVPB 1000 milliGRAM(s) IV Intermittent every 12 hours  levothyroxine 50 MICROGram(s) Oral daily  metoprolol tartrate 50 milliGRAM(s) Oral two times a day  niCARdipine Infusion 5 mG/Hr IV Continuous <Continuous>  pantoprazole    Tablet 40 milliGRAM(s) Oral before breakfast  piperacillin/tazobactam IVPB.. 3.375 Gram(s) IV Intermittent once  propofol Infusion 10 MICROgram(s)/kG/Min IV Continuous <Continuous>  sodium chloride 0.9%. 1000 milliLiter(s) IV Continuous <Continuous>    EXAMINATION:  General:  NAd, received paralytics, sedation recently, for intubation.  Neuro:  comatose, no EO, no FC, pupils both NR R 4mm and L 3mm,   Cards:  S1S2 present.  Respiratory:  BL diminished breath sounds.  Abdomen:  soft, mild distension., mild anasarca.  Extremities:  BL LE edema. LUE AVF.  Skin:  warm/dry

## 2022-09-04 NOTE — ED PROVIDER NOTE - PROGRESS NOTE DETAILS
[place]: ~1345 mental status worse, left pupil blown, no movement LE b/l.  sent for ct. with large biventricular bleed, poor mental status, neurosurg consulted, intubated for airway protection.

## 2022-09-04 NOTE — PROGRESS NOTE ADULT - SUBJECTIVE AND OBJECTIVE BOX
HPI: 62F w/ PMH HTN, HLD, ESRD on HD (M/W/F), OP, CHF (EF=40-45%), DM, pAfib not on AC sec to GI bleed, hypothyroid, anemia of chronic disease, cirrhosis (?VELA) w/ frequent ascitis, recent admit to PMN & tx w/ Zosyn. Pt was dropped off in ER w/ hypertensive crisis/ hyperglycemia ~600/ fever TMax: 100.7 w/ vomitine & decline in MS requiring intubation for airway protection. CT showed extensive IVH/Hydro.     IVH=3, NIHSS=33, mRs=unknown (scores on admission)    1A: Level of consciousness (0-3): 3  1B: Questions (0-2): 2    1C: Commands (0-2): 2  2: Gaze (0-2): 0  3: Visual fields (0-3): 3  4: Facial palsy (0-3): 0  MOTOR:  5A: Left arm motor drift (0-4): 4  5B: Right arm motor drift (0-4): 4  6A: Left leg motor drift (0-4): 4  6B: Right leg motor drift (0-4): 4  7: Limb ataxia (0-2): 0  SENSORY:  8: Sensation (0-2): 2  SPEECH:  9: Language (0-3): 3  10: Dysarthria (0-2): 2  EXTINCTION:  11: Extinction/inattention (0-2): 0    TOTAL SCORE: 33    Interval history: Patient received one unit platelets for ASA use & DDAVP 28mg. Emergent EVD placed, CT post EVD done, cath in 3rd vent. CTA non occlusive thrombus R vert V4/vasilar, occluded L V1 segment. Patient required Insulin gtt for severe hypoglycemia, nicardipine gtt for BP control. a-line & midline for multiple drips. (unable to use Left UE sec to AVF)  Nephrology eval for HD    PAST MEDICAL & SURGICAL HISTORY:  DM (diabetes mellitus)  HTN (hypertension)  HLD (hyperlipidemia)  End stage renal disease on dialysis (M/W/F)  Pericardial effusion  Pleural effusion  Chronic systolic congestive heart failure  Anemia due to chronic kidney disease, unspecified CKD stage  Paroxysmal atrial fibrillation  Coronary artery disease, angina presence unspecified, unspecified vessel or lesion type, unspecified whether native or transplanted heart  Diabetes Encounter for dialysis catheter care  A-V fistula    FAMILY HISTORY:  Family history of kidney disease in brother (Sibling)    Social History:  unable to obtain at this time (04 Sep 2022 18:00)    Review of Systems:  Review of Systems is Limited due to patient's neurologic status.    Physical Exam:  Constitutional: Intubated, not on sedation    Neuro  * Mental Status:  Intubated  * Cranial Nerves: No EO, Pupils R oval, L round both ~4.5mm non-reactive, No corneals, weak gag, no cough  * Motor: posturing UE, LE WD L>R  * Sensory: Sensation to noxious stimuli  * Reflexes: Not assessed  * Gait: Not assessed  * Wound: c/d/i, EVD not patent    Cardiovascular:  S1, S2 no murmurs appreciated.  Regular rate and rhythm.  Eyes: See neurologic examination with detailed examination of eyes.  ENT: No JVD, Trachea Midline.  Respiratory: decrease BS at the bases  Gastrointestinal: Soft, nontender, nondistended.  Genitourinary: [x] Morton,  Musculoskeletal: No muscle wasting noted, (See neuorlogic assessment for full muscle strength assessment) No pretibial edema appreciated, no appreciable calf tenderness.  Skin: no skin breakdown, L AVF  Hematologic / Lymph / Immunologic: No bleeding from IV sites or wounds, No lymphadenopathy, No Hives or allergic type skin lesions    Vital Signs Last 24 Hrs  T(C): 33.7 (04 Sep 2022 23:00), Max: 38.2 (04 Sep 2022 13:58)  T(F): 92.7 (04 Sep 2022 23:00), Max: 100.7 (04 Sep 2022 13:58)  HR: 57 (04 Sep 2022 23:00) (57 - 92)  BP: 127/63 (04 Sep 2022 23:00) (117/61 - 200/106)  BP(mean): 81 (04 Sep 2022 23:00) (77 - 97)  RR: 16 (04 Sep 2022 23:00) (12 - 21)  SpO2: 100% (04 Sep 2022 23:00) (93% - 100%)    Labs & Radiology:                   13.0   4.68  )-----------( 186      ( 04 Sep 2022 14:19 )             41.3     126<L>  |  86<L>  |  28.0<H>  ----------------------------<  533<HH>  4.6   |  24.0  |  3.88<H>    Ca    9.7      04 Sep 2022 14:19    TPro  9.2<H>  /  Alb  3.4  /  TBili  0.9  /  DBili  x   /  AST  42<H>  /  ALT  30  /  AlkPhos  591<H>  09-04      LIVER FUNCTIONS - ( 04 Sep 2022 14:19 )  Alb: 3.4 g/dL / Pro: 9.2 g/dL / ALK PHOS: 591 U/L / ALT: 30 U/L / AST: 42 U/L / GGT: x         PT/INR - ( 04 Sep 2022 14:19 )   PT: 12.2 sec;   INR: 1.05 ratio    PTT - ( 04 Sep 2022 14:19 )  PTT:36.8 sec  ABG - ( 04 Sep 2022 21:25 )  pH, Arterial: 7.460 pH, Blood: x     /  pCO2: 42    /  pO2: 146   / HCO3: 30    / Base Excess: 6.1   /  SaO2: 99.6      Neurosurgery Imaging:  CT Head No Cont (09.04.22 @ 16:21) >  IMPRESSION:  Large volume acute intraventricular hemorrhage resulting in mild   hydrocephalus. Recommend CTA to exclude an anterior communicating artery   aneurysm.    CT Chest No Cont (09.04.22 @ 16:21) >  IMPRESSION:  Interval worsening of the alveolar and consolidative airspace opacities   involving the right lower lobe and middle lobe now extending to the upper   lobe. Additional interstitial interlobular septal thickening. Findings   are compatible with pulmonary edema and congestion, with concern for   superimposed pneumonia in the appropriate clinical setting.    Cardiomegaly.    Moderate to large amount of ascites, slightly worsened from prior study.   Anasarca.    Splenomegaly. Unchanged prominent mediastinal and retroperitoneal   adenopathy.    VERTEBRAL BODY ANALYSIS: Low bone density as described above, consider   further workup for osteoporosis.    CT Angio Head w/ IV Cont (09.04.22 @ 20:36) >    IMPRESSION:    Nonocclusive thrombus in the proximal right basilar artery. Occluded left   vertebral artery distal V4 segment.    Medications:  MEDICATIONS  (STANDING):  amLODIPine   Tablet 10 milliGRAM(s) Oral daily  atorvastatin 40 milliGRAM(s) Oral at bedtime  chlorhexidine 0.12% Liquid 15 milliLiter(s) Oral Mucosa every 12 hours  chlorhexidine 2% Cloths 1 Application(s) Topical daily  dextrose 50% Injectable 50 milliLiter(s) IV Push every 15 minutes  hydrALAZINE 25 milliGRAM(s) Oral three times a day  insulin regular Infusion 2 Unit(s)/Hr (2 mL/Hr) IV Continuous <Continuous>  levothyroxine 50 MICROGram(s) Oral daily  metoprolol tartrate 50 milliGRAM(s) Oral two times a day  niCARdipine Infusion 5 mG/Hr (25 mL/Hr) IV Continuous <Continuous>  pantoprazole    Tablet 40 milliGRAM(s) Oral before breakfast  piperacillin/tazobactam IVPB.. 3.375 Gram(s) IV Intermittent once  propofol Infusion 10 MICROgram(s)/kG/Min (4.22 mL/Hr) IV Continuous <Continuous>  sodium chloride 0.9%. 1000 milliLiter(s) (75 mL/Hr) IV Continuous <Continuous>    MEDICATIONS  (PRN):  acetaminophen     Tablet .. 650 milliGRAM(s) Oral every 6 hours PRN Temp greater or equal to 38C (100.4F), Mild Pain (1 - 3)  hydrALAZINE Injectable 10 milliGRAM(s) IV Push every 2 hours PRN SBP> 140  labetalol Injectable 10 milliGRAM(s) IV Push every 2 hours PRN SBP >140

## 2022-09-04 NOTE — ED ADULT NURSE REASSESSMENT NOTE - NS ED NURSE REASSESS COMMENT FT1
Neuro PA to start procedure of EVD placement, patient in NAD, VSS, premedicated as per orders, will monitor patient.

## 2022-09-04 NOTE — CONSULT NOTE ADULT - ASSESSMENT
63 yo F with multiple co-morbidities, presented with large IVH/ obstructive hydrocephalus, s/p emergent EVD placement.  HTN emergency.  H/o CAD on ASA; Afib not on AC due to GIB.  Concern for PNA.  Cirrhosis with moderate ascites.  ESRD on HD.  Hyperglycemia, DM.    PLan:  admist to NSICU  maintain EVD @0, monitor output  hold sedation, if needed - Precedex ggt, PRN 12.5 mg Fentanyl IVP  pending repeat CT, CTA   NGy involved  s/p DDAVP and 1unit of plts  vent support, abg  HD for tomorrow, Renal involvement would be appreciated  NPo for now, BM regimen, PPI for ppx  SCDs, no AC in view of brain bleed  started on Insulin ggt, monitor FS  GOC discussion, waiting for family arrival  Full code for now

## 2022-09-04 NOTE — H&P ADULT - NSHPPHYSICALEXAM_GEN_ALL_CORE
GENERAL: NAD, well-groomed, well-developed/obese female, unresponsive w agonal breaths/ gargling   HEAD: Atraumatic, Normocephalic, no palpable step-off appreciated on palpation  EYES: pls see neuro exam   NECK: unable to assess due to current clinical exam   TS/LS: unable to assess due to current clinical exam    NERVOUS SYSTEM: unresponsive in bed/ agonally breathing/ gargling, no eye opening to verbal or deep central stimuli, Right pupil is 3mm and nonreactive to light/ fixed and + subconjunctival hemorrhage ( per ED likely related to pt's vomiting), Left pupil is 4mm nonreactive to light and fixed, minimal corneal reflex to left pupil and no corneal reflex on the right. no gag stim on suctioning, extensor posturing b/l UEs, minimally withdrawing LLE vs trippleflexing and no withdrawal/ movement to RLE to deep nailbed pressure.   EXTREMITIES: ? venous insuffiencey/ hemosiderin deposits b/l below knees w ankle edema   CHEST/LUNG: b/l rhonchi, + air exchange   HEART: +S1 & +S2  ABDOMEN: Soft, Nondistended/ +ascitics;  SKIN: No rashes or lesions appreciated

## 2022-09-04 NOTE — PROGRESS NOTE ADULT - ASSESSMENT
Assessment  61yo F with extensive medical problems, p/w hemorrhagic stroke, IVH/hydrocephalus  brain compression requiring EVD placement  acute hypoxic respiratory failure   Severe hyperglycemia   hyponatremia    Plan  Neuro:  Admit to neuro ICU  	Q1 hour Neuro checks, Q1 hour Vitals  	HOB 30 degrees, Neck midline position  	Maintain normothermia, PO acetaminophen for temp>38 C or pain, nevaeh hugger for hypothermia              EVD set at 0 cm H2O monitor and record the output, Maintin ICP<22              rpt Head Ct in am  	Pain management & Sedation: Propofol as needed  	Turn and Position Q2  /  Activity ad you, with assistance  	  CV:       SBP Goal , nicardipine gtt, hydralazine/labetalol prn  	BP regimen: restarted on home meds, statin  	Access: midline placed  	Arterial line              Echo ordered              No AC,               CE, EKG    Pulm:    Full vent support  	Mode: AC/ CMV (Assist Control/ Continuous Mandatory Ventilation)  RR (machine): 16  TV (machine): 400  FiO2: 30  PEEP: 5  MAP: 10  PIP: 30  	Chest PT, OOB, Pulmonary Toilet              abg with vent adjustment              CXR, ETT in place, + righ lung pneumonia    GI:  	Nutrition: NPO, OGT  	GI prophylaxis: protonix, pepcid (GIB in the past)  	Bowel regimen: as needed  	  Gu:  	Morton, ? remove, not sure if anuric              Nephrology consult for HD in am  	I&O Q1 hour  	Monitor Electrolytes & Renal Function              Hyponatremia, NS @75, rpt BMP, might require hypertonic saline    Heme:   Anemia of chronic disease sec to ERDS  	Monitor H&H  	Chemical DVT prophylaxis is contraindicated due to risk of bleeding  	Mechanical DVT Prophylaxis: Maintain B/L LE sequential compression devices  	  ID:  	Monitor WBC and Temperature curve  	Antibiotic Regimen: Zosyn  	Follow cultures: Pending [ X ]                Hypothermic requiring nevaeh hugger     Endo  	Monitor BGL, maintain <180  	on Insulin gtt              sont synthroid               HgA1C & lipid profile in am    I have spent a total of 35 mins of nonconsecutive critical care time managing this patient with hemorrhagic stoke, IVH/hydrocephalus, brain compression, hyperglycemia, acute hypoxic respiratory failure, hypnatremia.  This included review of relevant history, clinical examination, review of data and images, discussion of treatment with the multidisciplinary team and any consultants involved in this patient’s care as well as family discussion.     I affirm that this patient is critically ill and at high risk for sudden, fatal deterioration due to one or more of the above stated active issues. I managed/supervised life or organ support interventions that required frequent assessment. This time does not include bedside procedures that are documented separately.

## 2022-09-04 NOTE — PATIENT PROFILE ADULT - NSPRESCRALCAMT_GEN_A_NUR
Brief Operative Note    Patient: Flako Barry 69 year old male    MRN: [id number]    Surgeon(s): Delroy Childress MD  Phone Number: [phone]                       Surgeon(s) and Role:     * Delroy Childress MD - Primary    Assistant(s):     Pre-Op Diagnosis: Primary osteoarthritis of left knee [M17.12]     Post-Op Diagnosis: same     Procedure: Procedure(s):  LEFT UNICOMPARTMENTAL KNEE REPLACEMENT    Anesthesia Type: General                                   Complications: None    Description:     Findings: djd    Specimens Removed: No specimens collected     Estimated Blood Loss: Neglig tt 45min    Assistant Tasks: Opening and closing     Implants:   Implant Name Type Inv. Item Serial No.  Lot No. LRB No. Used Action   CEMENT BN BIOMET 40GM - [id number] Filler CEMENT BN BIOMET 40GM  [employer] [id number] Left 1 Implanted   partial tibial cemented size h left medial     JEN [employer] [id number] Left 1 Implanted   COMPONENT FEM 5 KN LT MEDIAL PERSONA STRL - [id number] Component COMPONENT FEM 5 KN LT MEDIAL PERSONA STRL  [employer] [id number] Left 1 Implanted   partial articular surface left medial size h 9 mm thickness    JEN BI[employer]ET [id number] Left 1 Implanted          YANIQUE pt intubated and unresponsive

## 2022-09-04 NOTE — ED PROVIDER NOTE - PRO INTERPRETER NEED 2
pt c/o crushing chest pain radiating through to back that started yesterday with SOB. pt had gallbladder removed on Tuesday.
[place]

## 2022-09-04 NOTE — ED ADULT NURSE NOTE - OBJECTIVE STATEMENT
Patient presents to ED patient with HX of  ESRD on dialysis (M/W/F), sent by family due to  N/V, fever, and unresponsiveness since earlier today per EMS. Pt was able to go to dialysis on Friday. Patient is awake and responsive to voice but unable to provide information, resp even/labored, place in critical for close monitoring.

## 2022-09-04 NOTE — ED ADULT TRIAGE NOTE - CHIEF COMPLAINT QUOTE
pt with nausea, vomiting that started after waking up today. Pt found tp be febrile, hyperglycemic and hypertensive.

## 2022-09-05 NOTE — PROGRESS NOTE ADULT - SUBJECTIVE AND OBJECTIVE BOX
HPI: 62F w/ PMH HTN, HLD, ESRD on HD (M/W/F), OP, CHF (EF=40-45%), DM, pAfib not on AC sec to GI bleed, hypothyroid, anemia of chronic disease, cirrhosis (?VELA) w/ frequent ascitis, recent admit to PMN & tx w/ Zosyn. Pt was dropped off in ER w/ hypertensive crisis/ hyperglycemia ~600/ fever TMax: 100.7 w/ vomitine & decline in MS requiring intubation for airway protection. CT showed extensive IVH/Hydro.     IVH=3, NIHSS=33, mRs=unknown (scores on admission)    Interval history: Patient received one unit platelets for ASA use & DDAVP 28mg. Emergent EVD placed, CT post EVD done, cath in 3rd vent. CTA non occlusive thrombus R vert V4/vasilar, occluded L V1 segment. Patient required Insulin gtt for severe hypoglycemia, nicardipine gtt for BP control. a-line & midline for multiple drips. (unable to use Left UE sec to AVF)  Nephrology eval for HD    Physical Exam:  Constitutional: Intubated, not on sedation    Neuro  * Mental Status:  Intubated  * Cranial Nerves: No EO, Pupils R oval, L round both ~4.5mm non-reactive, No corneals, weak gag, no cough  * Motor: posturing UE, LE WD L>R  * Sensory: Sensation to noxious stimuli  * Reflexes: Not assessed  * Gait: Not assessed  * Wound: c/d/i, EVD not patent    Vital Signs Last 24 Hrs  T(C): 34.6 (05 Sep 2022 00:30), Max: 38.2 (04 Sep 2022 13:58)  T(F): 94.3 (05 Sep 2022 00:30), Max: 100.7 (04 Sep 2022 13:58)  HR: 56 (05 Sep 2022 00:30) (55 - 92)  BP: 134/61 (05 Sep 2022 00:30) (117/61 - 200/106)  BP(mean): 81 (05 Sep 2022 00:30) (75 - 97)  RR: 16 (05 Sep 2022 00:30) (12 - 21)  SpO2: 99% (05 Sep 2022 00:30) (93% - 100%)    Labs:                     13.0   4.68  )-----------( 186      ( 04 Sep 2022 14:19 )             41.3     126<L>  |  86<L>  |  28.0<H>  ----------------------------<  533<HH>  4.6   |  24.0  |  3.88<H>    Neurosurgery Imaging:  CT Head No Cont (09.04.22 @ 16:21) >  IMPRESSION:  Large volume acute intraventricular hemorrhage resulting in mild   hydrocephalus. Recommend CTA to exclude an anterior communicating artery   aneurysm.    CT Chest No Cont (09.04.22 @ 16:21) >  IMPRESSION:  Interval worsening of the alveolar and consolidative airspace opacities   involving the right lower lobe and middle lobe now extending to the upper   lobe. Additional interstitial interlobular septal thickening. Findings   are compatible with pulmonary edema and congestion, with concern for   superimposed pneumonia in the appropriate clinical setting.    Cardiomegaly.    Moderate to large amount of ascites, slightly worsened from prior study.   Anasarca.    Splenomegaly. Unchanged prominent mediastinal and retroperitoneal   adenopathy.    VERTEBRAL BODY ANALYSIS: Low bone density as described above, consider   further workup for osteoporosis.    CT Angio Head w/ IV Cont (09.04.22 @ 20:36) >    IMPRESSION:    Nonocclusive thrombus in the proximal right basilar artery. Occluded left   vertebral artery distal V4 segment

## 2022-09-05 NOTE — CONSULT NOTE ADULT - ASSESSMENT
61yo F with extensive medical problems, p/w hemorrhagic stroke, IVH/hydrocephalus  brain compression requiring EVD placement  On admission a1c was 10.6%, endo consulted for same. No diabetic meds per chart review.    DM type 2 poorly controlled:  Will recommend to start basal insulin lantus 10 units daily and continue admelog ssi.  check fingersticks ac tid hs.  Will adjust insulin doses as needed.    Hypothyroidism:  TSH 14  Will recommend to go up on dose of levothyroxine to 75 mcg daily.  Recheck levels in 4 weeks as out patient.    CKDon HD:  being managed by nephro    HTN emergency:  now controlled on nicardipine infusion       63yo F with extensive medical problems, p/w hemorrhagic stroke, IVH/hydrocephalus  brain compression requiring EVD placement  On admission a1c was 10.6%, endo consulted for same. No diabetic meds per chart review.  No history available, patient intubated.    DM type 2 poorly controlled:  Will recommend to start basal insulin lantus 10 units daily and continue admelog ssi.  check fingersticks ac tid hs.  Will adjust insulin doses as needed.    Hypothyroidism:  TSH 14  Will recommend to go up on dose of levothyroxine to 75 mcg daily.  Recheck levels in 4 weeks as out patient.    CKD on HD:  being managed by nephro    HTN emergency:  now controlled on nicardipine infusion

## 2022-09-05 NOTE — DISCHARGE NOTE PROVIDER - HOSPITAL COURSE
HPI: 62F w/ PMH HTN, HLD, ESRD on HD (M/W/F), OP, CHF (EF=40-45%), DM, pAfib not on AC sec to GI bleed, hypothyroid, anemia of chronic disease, cirrhosis (?VELA) w/ frequent ascitis, recent admit to PMN & tx w/ Zosyn. Pt was dropped off in ER w/ hypertensive crisis/ hyperglycemia ~600/ fever TMax: 100.7 w/ vomitine & decline in MS requiring intubation for airway protection. CT showed extensive IVH/Hydro.     IVH=3, NIHSS=33, mRs=unknown (scores on admission).    NIHSS=   mRs=     (on discharge)    Interval history: Patient received one unit platelets for ASA use & DDAVP 28mg. Emergent EVD placed, CT post EVD done, cath in 3rd vent. CTA non occlusive thrombus R vert V4/vasilar, occluded L V1 segment. Patient required Insulin gtt for severe hypoglycemia, nicardipine gtt for BP control. a-line & midline for multiple drips. (unable to use Left UE sec to AVF)  Nephrology eval for HD      Incomplete 9/5 ___

## 2022-09-05 NOTE — PROGRESS NOTE ADULT - ASSESSMENT
Assessment  63yo F with extensive medical problems, p/w hemorrhagic stroke, IVH/hydrocephalus  brain compression requiring EVD placement  acute hypoxic respiratory failure   Severe hyperglycemia   hyponatremia    Plan  - neuro checks q1hr  - EVD at 0, keep ICP <22  - rpt head Ct this morning  - sedation: propofol if needed  - keep BP , cardine, hydral/labetalol prn  - cont po BP meds  - cotn vent support  - NPO for now  - IVF  - transition insulin gtt as needed  - nephrology for HD  - SCD s for DVt prophylaxis  - poor prognosis  - [employer] consulted

## 2022-09-05 NOTE — PROGRESS NOTE ADULT - ASSESSMENT
63 yo F with multiple co-morbidities, presented with large IVH/ obstructive hydrocephalus, concern for L BG ICH, score 3. S/p emergent EVD placement.  Nonocclusive thrombus in the proximal right basilar artery. Occluded left vertebral artery distal V4 segment.  H/o CAD on ASA; Afib not on AC due to GIB. Mixed CHF.  Acute resp failure. PNA.  Cirrhosis with moderate ascites.  ESRD on HD.  Hyperglycemia, DM.    Plan  cont neurochecks, off sedation, PRN fentanyl 25 for agitation/vent dyssync  maintain EVD @0, monitor outp  NGy involved  vent support, adjust as needed, SBT as tolerated  dialysed today, HD for Wed, Renal involvement appreciated  start TF, BM regimen, PPI for ppx  SCDs, no AC in view of brain bleed  started on Insulin regimen SQ, monitor FS  cont Zosyn, follow Cx, ascitic fluid sent today  GOC discussion, ongoing  Full code for now

## 2022-09-05 NOTE — DISCHARGE NOTE PROVIDER - NSDCCPCAREPLAN_GEN_ALL_CORE_FT
PRINCIPAL DISCHARGE DIAGNOSIS  Diagnosis: Hemorrhagic stroke  Assessment and Plan of Treatment:

## 2022-09-05 NOTE — CONSULT NOTE ADULT - SUBJECTIVE AND OBJECTIVE BOX
Patient is a 62y old  Female who presents with a chief complaint of severe IVH  \    HPI:  Patient is a 62y old  Female  PMH HTN, HLD, ESRD on HD (M/W/F), OP, CHF (EF=40-45%), DM, pAfib not on AC sec to GI bleed, hypothyroid, anemia of chronic disease, cirrhosis (?VELA) w/ frequent ascitis,  recent admit to PMN & tx w/ Zosyn. who presents with a chief complaint of hypertensive crisis/ hyperglycemia ~600/ fever TMax: 100.7 w vomiting, who was declining clinically w lethargy since in ED   CTB was obtained and extensive B/L IVH casting 3rd and 4th vents were noted. pt has been intubated shortly after CT for poor exam/ agonal breaths/ GCS 4.  per d/w son, Feliciano, unable to reach Keshia, daughter/ HCP, full code and all measures to be done, R/B?A of EVD explained alongside possibility of worsening hemorrhage and he understood and consent obtained via phone.    On admission a1c was 10.6$%, endo consulted for same. No diabetic meds per chart review.    Vital Signs Last 24 Hrs  T(C): 38.2 (04 Sep 2022 13:58), Max: 38.2 (04 Sep 2022 13:58)  T(F): 100.7 (04 Sep 2022 13:58), Max: 100.7 (04 Sep 2022 13:58)  HR: 73 (04 Sep 2022 17:36) (70 - 92)  BP: 134/71 (04 Sep 2022 17:36) (119/68 - 200/106)  BP(mean): --  RR: 16 (04 Sep 2022 17:36) (16 - 21)  SpO2: 100% (04 Sep 2022 17:36) (93% - 100%)    Parameters below as of 04 Sep 2022 17:36  Patient On (Oxygen Delivery Method): ventilator (04 Sep 2022 18:00)      PAST MEDICAL & SURGICAL HISTORY:  DM (diabetes mellitus)    HTN (hypertension)    HLD (hyperlipidemia)    End stage renal disease on dialysis    Pericardial effusion    Pleural effusion    Chronic systolic congestive heart failure    Anemia due to chronic kidney disease, unspecified CKD stage    Paroxysmal atrial fibrillation    Coronary artery disease, angina presence unspecified, unspecified vessel or lesion type, unspecified whether native or transplanted heart    Diabetes    End stage renal disease    Encounter for dialysis catheter care    A-V fistula    A-V fistula        Social History:  unable to obtain at this time (04 Sep 2022 18:00)      FAMILY HISTORY:  Family history of kidney disease in brother (Sibling)          Allergies    eggs (Anaphylaxis)  No Known Drug Allergies            REVIEW OF SYSTEMS:          MEDICATIONS  (STANDING):  amLODIPine   Tablet 10 milliGRAM(s) Oral daily  atorvastatin 40 milliGRAM(s) Oral at bedtime  chlorhexidine 0.12% Liquid 15 milliLiter(s) Oral Mucosa every 12 hours  chlorhexidine 2% Cloths 1 Application(s) Topical daily  dextrose 10%. 250 milliLiter(s) (1000 mL/Hr) IV Continuous <Continuous>  dextrose 5%. 1000 milliLiter(s) (50 mL/Hr) IV Continuous <Continuous>  dextrose 5%. 1000 milliLiter(s) (100 mL/Hr) IV Continuous <Continuous>  dextrose 50% Injectable 25 Gram(s) IV Push once  dextrose 50% Injectable 12.5 Gram(s) IV Push once  dextrose 50% Injectable 25 Gram(s) IV Push once  glucagon  Injectable 1 milliGRAM(s) IntraMuscular once  hydrALAZINE 25 milliGRAM(s) Oral three times a day  insulin lispro (ADMELOG) corrective regimen sliding scale   SubCutaneous every 6 hours  levothyroxine 50 MICROGram(s) Oral daily  metoprolol tartrate 50 milliGRAM(s) Oral two times a day  niCARdipine Infusion 5 mG/Hr (25 mL/Hr) IV Continuous <Continuous>  pantoprazole    Tablet 40 milliGRAM(s) Oral before breakfast  piperacillin/tazobactam IVPB.. 3.375 Gram(s) IV Intermittent every 12 hours    MEDICATIONS  (PRN):  acetaminophen     Tablet .. 650 milliGRAM(s) Oral every 6 hours PRN Temp greater or equal to 38C (100.4F), Mild Pain (1 - 3)  dextrose Oral Gel 15 Gram(s) Oral once PRN Blood Glucose LESS THAN 70 milliGRAM(s)/deciliter  hydrALAZINE Injectable 10 milliGRAM(s) IV Push every 2 hours PRN SBP> 140  labetalol Injectable 10 milliGRAM(s) IV Push every 2 hours PRN SBP >140      Vital Signs Last 24 Hrs  T(C): 37 (05 Sep 2022 12:00), Max: 38.2 (04 Sep 2022 13:58)  T(F): 98.6 (05 Sep 2022 12:00), Max: 100.7 (04 Sep 2022 13:58)  HR: 75 (05 Sep 2022 12:00) (55 - 92)  BP: 119/60 (05 Sep 2022 12:00) (117/59 - 200/106)  BP(mean): 76 (05 Sep 2022 12:00) (75 - 97)  RR: 18 (05 Sep 2022 12:00) (12 - 21)  SpO2: 99% (05 Sep 2022 12:00) (93% - 100%)    Parameters below as of 05 Sep 2022 12:00  Patient On (Oxygen Delivery Method): ventilator    O2 Concentration (%): 40      PHYSICAL EXAM:    Constitutional: NAD  HEENT: PERRL, EOMI, no exophalmos  Neck: No LAD,  trachea midline, no thyroid enlargement  Respiratory: CTAB  Cardiovascular: S1 and S2, RRR, no M/G/R  Gastrointestinal: BS+, soft, no organomegaly  or mass  Extremities: No peripheral edema, no pedal lesions  Skin: No rashes, no acanthosis        LABS  09-05    129<L>  |  88<L>  |  31.9<H>  ----------------------------<  157<H>  3.4<L>   |  26.0  |  4.17<H>    Ca    8.9      05 Sep 2022 02:41  Phos  3.3     09-05  Mg     2.0     09-05    TPro  9.2<H>  /  Alb  3.4  /  TBili  0.9  /  DBili  x   /  AST  42<H>  /  ALT  30  /  AlkPhos  591<H>  09-04                          9.9    5.41  )-----------( 181      ( 05 Sep 2022 02:41 )             30.6       A1C with Estimated Average Glucose Result: 10.6 % (09-05-22 @ 02:41)          Albumin, Serum: 3.4 g/dL (09-04-22 @ 14:19)  Alkaline Phosphatase, Serum: 591 U/L (09-04-22 @ 14:19)  Alanine Aminotransferase (ALT/SGPT): 30 U/L (09-04-22 @ 14:19)  Aspartate Aminotransferase (AST/SGOT): 42 U/L (09-04-22 @ 14:19)    Thyroid Stimulating Hormone, Serum: 14.44 uIU/mL (09-05-22 @ 02:41)    CAPILLARY BLOOD GLUCOSE      POCT Blood Glucose.: 158 mg/dL (05 Sep 2022 05:55)  POCT Blood Glucose.: 68 mg/dL (05 Sep 2022 05:20)  POCT Blood Glucose.: 95 mg/dL (05 Sep 2022 04:04)  POCT Blood Glucose.: 155 mg/dL (05 Sep 2022 03:04)  POCT Blood Glucose.: 235 mg/dL (05 Sep 2022 02:03)  POCT Blood Glucose.: 365 mg/dL (05 Sep 2022 00:07)  POCT Blood Glucose.: 405 mg/dL (04 Sep 2022 23:06)  POCT Blood Glucose.: 396 mg/dL (04 Sep 2022 22:14)  POCT Blood Glucose.: 427 mg/dL (04 Sep 2022 19:10)  POCT Blood Glucose.: 503 mg/dL (04 Sep 2022 17:23)  POCT Blood Glucose.: >530 mg/dL (04 Sep 2022 16:49)  POCT Blood Glucose.: >530 mg/dL (04 Sep 2022 13:56)       Patient is a 62y old  Female who presents with a chief complaint of severe IVH  \    HPI:  Patient is a 62y old  Female  PMH HTN, HLD, ESRD on HD (M/W/F), OP, CHF (EF=40-45%), DM, pAfib not on AC sec to GI bleed, hypothyroid, anemia of chronic disease, cirrhosis (?VELA) w/ frequent ascitis,  recent admit to PMN & tx w/ Zosyn. who presents with a chief complaint of hypertensive crisis/ hyperglycemia ~600/ fever TMax: 100.7 w vomiting, who was declining clinically w lethargy since in ED   CTB was obtained and extensive B/L IVH casting 3rd and 4th vents were noted. pt has been intubated shortly after CT for poor exam/ agonal breaths/ GCS 4.  per d/w son, Feliciano, unable to reach Keshia, daughter/ HCP, full code and all measures to be done, R/B?A of EVD explained alongside possibility of worsening hemorrhage and he understood and consent obtained via phone.    On admission a1c was 10.6$%, endo consulted for same. Patient intubated, family not around . No diabetic meds per chart review.    Vital Signs Last 24 Hrs  T(C): 38.2 (04 Sep 2022 13:58), Max: 38.2 (04 Sep 2022 13:58)  T(F): 100.7 (04 Sep 2022 13:58), Max: 100.7 (04 Sep 2022 13:58)  HR: 73 (04 Sep 2022 17:36) (70 - 92)  BP: 134/71 (04 Sep 2022 17:36) (119/68 - 200/106)  BP(mean): --  RR: 16 (04 Sep 2022 17:36) (16 - 21)  SpO2: 100% (04 Sep 2022 17:36) (93% - 100%)    Parameters below as of 04 Sep 2022 17:36  Patient On (Oxygen Delivery Method): ventilator (04 Sep 2022 18:00)      PAST MEDICAL & SURGICAL HISTORY:  DM (diabetes mellitus)    HTN (hypertension)    HLD (hyperlipidemia)    End stage renal disease on dialysis    Pericardial effusion    Pleural effusion    Chronic systolic congestive heart failure    Anemia due to chronic kidney disease, unspecified CKD stage    Paroxysmal atrial fibrillation    Coronary artery disease, angina presence unspecified, unspecified vessel or lesion type, unspecified whether native or transplanted heart    Diabetes    End stage renal disease    Encounter for dialysis catheter care    A-V fistula    A-V fistula        Social History:  unable to obtain at this time (04 Sep 2022 18:00)      FAMILY HISTORY:  Family history of kidney disease in brother (Sibling)          Allergies    eggs (Anaphylaxis)  No Known Drug Allergies            REVIEW OF SYSTEMS:  not done, patient intubated and sedated        MEDICATIONS  (STANDING):  amLODIPine   Tablet 10 milliGRAM(s) Oral daily  atorvastatin 40 milliGRAM(s) Oral at bedtime  chlorhexidine 0.12% Liquid 15 milliLiter(s) Oral Mucosa every 12 hours  chlorhexidine 2% Cloths 1 Application(s) Topical daily  dextrose 10%. 250 milliLiter(s) (1000 mL/Hr) IV Continuous <Continuous>  dextrose 5%. 1000 milliLiter(s) (50 mL/Hr) IV Continuous <Continuous>  dextrose 5%. 1000 milliLiter(s) (100 mL/Hr) IV Continuous <Continuous>  dextrose 50% Injectable 25 Gram(s) IV Push once  dextrose 50% Injectable 12.5 Gram(s) IV Push once  dextrose 50% Injectable 25 Gram(s) IV Push once  glucagon  Injectable 1 milliGRAM(s) IntraMuscular once  hydrALAZINE 25 milliGRAM(s) Oral three times a day  insulin lispro (ADMELOG) corrective regimen sliding scale   SubCutaneous every 6 hours  levothyroxine 50 MICROGram(s) Oral daily  metoprolol tartrate 50 milliGRAM(s) Oral two times a day  niCARdipine Infusion 5 mG/Hr (25 mL/Hr) IV Continuous <Continuous>  pantoprazole    Tablet 40 milliGRAM(s) Oral before breakfast  piperacillin/tazobactam IVPB.. 3.375 Gram(s) IV Intermittent every 12 hours    MEDICATIONS  (PRN):  acetaminophen     Tablet .. 650 milliGRAM(s) Oral every 6 hours PRN Temp greater or equal to 38C (100.4F), Mild Pain (1 - 3)  dextrose Oral Gel 15 Gram(s) Oral once PRN Blood Glucose LESS THAN 70 milliGRAM(s)/deciliter  hydrALAZINE Injectable 10 milliGRAM(s) IV Push every 2 hours PRN SBP> 140  labetalol Injectable 10 milliGRAM(s) IV Push every 2 hours PRN SBP >140      Vital Signs Last 24 Hrs  T(C): 37 (05 Sep 2022 12:00), Max: 38.2 (04 Sep 2022 13:58)  T(F): 98.6 (05 Sep 2022 12:00), Max: 100.7 (04 Sep 2022 13:58)  HR: 75 (05 Sep 2022 12:00) (55 - 92)  BP: 119/60 (05 Sep 2022 12:00) (117/59 - 200/106)  BP(mean): 76 (05 Sep 2022 12:00) (75 - 97)  RR: 18 (05 Sep 2022 12:00) (12 - 21)  SpO2: 99% (05 Sep 2022 12:00) (93% - 100%)    Parameters below as of 05 Sep 2022 12:00  Patient On (Oxygen Delivery Method): ventilator    O2 Concentration (%): 40      PHYSICAL EXAM:    Constitutional: intubated  Neck: No LAD,  trachea midline, no thyroid enlargement  Respiratory: CTAB  Cardiovascular: S1 and S2, RRR, no M/G/R  Gastrointestinal: BS+, soft, no organomegaly  or mass  Extremities: No peripheral edema  Skin: No rashes, no acanthosis        LABS  09-05    129<L>  |  88<L>  |  31.9<H>  ----------------------------<  157<H>  3.4<L>   |  26.0  |  4.17<H>    Ca    8.9      05 Sep 2022 02:41  Phos  3.3     09-05  Mg     2.0     09-05    TPro  9.2<H>  /  Alb  3.4  /  TBili  0.9  /  DBili  x   /  AST  42<H>  /  ALT  30  /  AlkPhos  591<H>  09-04                          9.9    5.41  )-----------( 181      ( 05 Sep 2022 02:41 )             30.6       A1C with Estimated Average Glucose Result: 10.6 % (09-05-22 @ 02:41)          Albumin, Serum: 3.4 g/dL (09-04-22 @ 14:19)  Alkaline Phosphatase, Serum: 591 U/L (09-04-22 @ 14:19)  Alanine Aminotransferase (ALT/SGPT): 30 U/L (09-04-22 @ 14:19)  Aspartate Aminotransferase (AST/SGOT): 42 U/L (09-04-22 @ 14:19)    Thyroid Stimulating Hormone, Serum: 14.44 uIU/mL (09-05-22 @ 02:41)    CAPILLARY BLOOD GLUCOSE      POCT Blood Glucose.: 158 mg/dL (05 Sep 2022 05:55)  POCT Blood Glucose.: 68 mg/dL (05 Sep 2022 05:20)  POCT Blood Glucose.: 95 mg/dL (05 Sep 2022 04:04)  POCT Blood Glucose.: 155 mg/dL (05 Sep 2022 03:04)  POCT Blood Glucose.: 235 mg/dL (05 Sep 2022 02:03)  POCT Blood Glucose.: 365 mg/dL (05 Sep 2022 00:07)  POCT Blood Glucose.: 405 mg/dL (04 Sep 2022 23:06)  POCT Blood Glucose.: 396 mg/dL (04 Sep 2022 22:14)  POCT Blood Glucose.: 427 mg/dL (04 Sep 2022 19:10)  POCT Blood Glucose.: 503 mg/dL (04 Sep 2022 17:23)  POCT Blood Glucose.: >530 mg/dL (04 Sep 2022 16:49)  POCT Blood Glucose.: >530 mg/dL (04 Sep 2022 13:56)

## 2022-09-05 NOTE — CONSULT NOTE ADULT - ASSESSMENT
62y old  Female who presents with a chief complaint of hypertensive crisis/ hyperglycemia ~600/ fever TMax: 100.7 w vomiting, who was declining clinically w lethargy since in ED, CTB was obtained and extensive B/L IVH casting 3rd and 4th vents were noted. pt has been intubated shortly after CT for poor exam/ agonal breaths/ GCS 4.    ESRD    62y old  Female who presents with a chief complaint of hypertensive crisis/ hyperglycemia ~600/ fever TMax: 100.7 w vomiting, who was declining clinically w lethargy since in ED, CTB was obtained and extensive B/L IVH casting 3rd and 4th vents were noted. pt has been intubated shortly after CT for poor exam/ agonal breaths/ GCS 4.    ESRD  Will arrange dialysis today  Dtr called fro consent  HTN emergency BP now controlled- on Cardene    Large -IVH/ obstructive hydrocephalus, -->  intubated/ Emergent EVD placed  Neuro ICU care appreciated    Guarded prognosis

## 2022-09-05 NOTE — PROCEDURE NOTE - ADDITIONAL PROCEDURE DETAILS
A right sided midline catheter was placed with ultrasound guidance using modified Seldinger technique.  Procedure was performed in a sterile fashion using full barrier precautions.    Patient tolerated procedure well and there were no complications.      This procedure was performed for need for central access, lack of IV access, CVP monitoring, in a patient with hemorrhagic stroke
A  right sided radial arterial a-line was placed with  ultrasound guidance.  Preprocedure assessment of patient's ipsilateral ulnar artery revealed patency throughout the length of the vessel without evidence of critical stenosis, severe calcification or dissection of the vessel.    Procedure was performed in a sterile fashion using full barrier precautions.     Patient tolerated procedure well and there were no complications.  This procedure was performed for need for continuous BP monitoring in a patient with hemorrhagic stroke

## 2022-09-05 NOTE — PROGRESS NOTE ADULT - SUBJECTIVE AND OBJECTIVE BOX
HPI:  Patient is a 62y old  Female who presents with a chief complaint of hypertensive crisis/ hyperglycemia ~600/ fever TMax: 100.7 w vomiting, who was declining clinically w lethargy since in ED, CTB was obtained and extensive B/L IVH casting 3rd and 4th vents were noted. pt has been intubated shortly after CT for poor exam/ agonal breaths/ GCS 4.  per d/w son, Feliciano, unable to reach Keshia, daughter/ HCP, full code and all measures to be done, R/B?A of EVD eplained alongside possibility of worsening hemorrhage and he understood and consent obtained via phone.    - LOC   - trauma reported   ?Headache/ unable to obtain from pt/ per ER RN pt did not endorse headache   + Nausea /+ Vomiting  pt was dropped off to ED by granddaughter     Mode: AC/ CMV (Assist Control/ Continuous Mandatory Ventilation)  RR (machine): 16  TV (machine): 0.4  FiO2: 50  PEEP: 5  PIP: 16    Vital Signs Last 24 Hrs  T(C): 38.2 (04 Sep 2022 13:58), Max: 38.2 (04 Sep 2022 13:58)  T(F): 100.7 (04 Sep 2022 13:58), Max: 100.7 (04 Sep 2022 13:58)  HR: 73 (04 Sep 2022 17:36) (70 - 92)  BP: 134/71 (04 Sep 2022 17:36) (119/68 - 200/106)  BP(mean): --  RR: 16 (04 Sep 2022 17:36) (16 - 21)  SpO2: 100% (04 Sep 2022 17:36) (93% - 100%)    Parameters below as of 04 Sep 2022 17:36  Patient On (Oxygen Delivery Method): ventilator (04 Sep 2022 18:00)  On admission, the patient was:  GCS: 4t.  ICH score: 3.    O/n events: none reported.    VITALS:  T(C): , Max: 38 (09-05-22 @ 16:00)  HR:  (55 - 91)  BP:  (117/59 - 166/82)  ABP:  (103/97 - 150/146)  RR:  (12 - 21)  SpO2:  (94% - 100%)  Wt(kg): --  Device: Avea, Mode: AC/ CMV (Assist Control/ Continuous Mandatory Ventilation), RR (machine): 16, TV (machine): 400, FiO2: 40, PEEP: 5, MAP: 9, PIP: 27    09-04-22 @ 07:01  -  09-05-22 @ 07:00  --------------------------------------------------------  IN: 1528.5 mL / OUT: 452 mL / NET: 1076.5 mL    09-05-22 @ 07:01  -  09-05-22 @ 19:36  --------------------------------------------------------  IN: 537.5 mL / OUT: 1502 mL / NET: -964.5 mL      LABS:  Na: 133 (09-05 @ 13:00), 129 (09-05 @ 02:41), 126 (09-04 @ 14:19)  K: 4.7 (09-05 @ 13:00), 3.4 (09-05 @ 02:41), 4.6 (09-04 @ 14:19)  Cl: 94 (09-05 @ 13:00), 88 (09-05 @ 02:41), 86 (09-04 @ 14:19)  CO2: 27.0 (09-05 @ 13:00), 26.0 (09-05 @ 02:41), 24.0 (09-04 @ 14:19)  BUN: 18.2 (09-05 @ 13:00), 31.9 (09-05 @ 02:41), 28.0 (09-04 @ 14:19)  Cr: 2.77 (09-05 @ 13:00), 4.17 (09-05 @ 02:41), 3.88 (09-04 @ 14:19)  Glu: 138(09-05 @ 13:00), 157(09-05 @ 02:41), 533(09-04 @ 14:19)    Hgb: 9.9 (09-05 @ 02:41), 13.0 (09-04 @ 14:19)  Hct: 30.6 (09-05 @ 02:41), 41.3 (09-04 @ 14:19)  WBC: 5.41 (09-05 @ 02:41), 4.68 (09-04 @ 14:19)  Plt: 181 (09-05 @ 02:41), 186 (09-04 @ 14:19)      IMAGING:   Recent imaging studies were reviewed.    MEDICATIONS:  acetaminophen     Tablet .. 650 milliGRAM(s) Oral every 6 hours PRN  amLODIPine   Tablet 10 milliGRAM(s) Oral daily  atorvastatin 40 milliGRAM(s) Oral at bedtime  chlorhexidine 0.12% Liquid 15 milliLiter(s) Oral Mucosa every 12 hours  chlorhexidine 2% Cloths 1 Application(s) Topical daily  dextrose 10%. 250 milliLiter(s) IV Continuous <Continuous>  dextrose 5%. 1000 milliLiter(s) IV Continuous <Continuous>  dextrose 5%. 1000 milliLiter(s) IV Continuous <Continuous>  dextrose 50% Injectable 25 Gram(s) IV Push once  dextrose 50% Injectable 12.5 Gram(s) IV Push once  dextrose 50% Injectable 25 Gram(s) IV Push once  dextrose Oral Gel 15 Gram(s) Oral once PRN  glucagon  Injectable 1 milliGRAM(s) IntraMuscular once  hydrALAZINE 25 milliGRAM(s) Oral three times a day  hydrALAZINE Injectable 10 milliGRAM(s) IV Push every 2 hours PRN  insulin glargine Injectable (LANTUS) 10 Unit(s) SubCutaneous at bedtime  insulin lispro (ADMELOG) corrective regimen sliding scale   SubCutaneous every 6 hours  labetalol Injectable 10 milliGRAM(s) IV Push every 2 hours PRN  levothyroxine 75 MICROGram(s) Enteral Tube daily  metoprolol tartrate 50 milliGRAM(s) Oral two times a day  niCARdipine Infusion 5 mG/Hr IV Continuous <Continuous>  pantoprazole    Tablet 40 milliGRAM(s) Oral before breakfast  piperacillin/tazobactam IVPB.. 3.375 Gram(s) IV Intermittent every 12 hours    EXAMINATION:  General:  calm, NAD. Off sedation.  Neuro:  comatose, pupils NR R5mm L 4 mm, + BL corneals, no dolls, + cough, o/b the vent, not FC, motor - posturing movements in Bl UE, LE ?WD L>R.  Cards:  RRR  Respiratory:  no respiratory distress, diminished on the R, ETT with hemorrhagic secretions.  Abdomen:  soft, mild distension.  Extremities:  mild pitting edema  Skin:  warm/dry

## 2022-09-05 NOTE — CONSULT NOTE ADULT - SUBJECTIVE AND OBJECTIVE BOX
HPI:  Patient is a 62y old  Female who presents with a chief complaint of hypertensive crisis/ hyperglycemia ~600/ fever TMax: 100.7 w vomiting, who was declining clinically w lethargy since in ED, CTB was obtained and extensive B/L IVH casting 3rd and 4th vents were noted. pt has been intubated shortly after CT for poor exam/ agonal breaths/ GCS 4.  per d/w son, Feliciano, unable to reach Keshia, daughter/ HCP, full code and all measures to be done, R/B?A of EVD eplained alongside possibility of worsening hemorrhage and he understood and consent obtained via phone.    - LOC   - trauma reported   ?Headache/ unable to obtain from pt/ per ER RN pt did not endorse headache   + Nausea /+ Vomiting  pt was dropped off to ED by granddaughter     Mode: AC/ CMV (Assist Control/ Continuous Mandatory Ventilation)  RR (machine): 16  TV (machine): 0.4  FiO2: 50  PEEP: 5  PIP: 16    Vital Signs Last 24 Hrs  T(C): 38.2 (04 Sep 2022 13:58), Max: 38.2 (04 Sep 2022 13:58)  T(F): 100.7 (04 Sep 2022 13:58), Max: 100.7 (04 Sep 2022 13:58)  HR: 73 (04 Sep 2022 17:36) (70 - 92)  BP: 134/71 (04 Sep 2022 17:36) (119/68 - 200/106)  BP(mean): --  RR: 16 (04 Sep 2022 17:36) (16 - 21)  SpO2: 100% (04 Sep 2022 17:36) (93% - 100%)    Parameters below as of 04 Sep 2022 17:36  Patient On (Oxygen Delivery Method): ventilator (04 Sep 2022 18:00)   Hx renal stones x1 not aware of type, no other renal  problems; elevated creatinine on admission.    PAST MEDICAL & SURGICAL HISTORY:  DM (diabetes mellitus)      HTN (hypertension)      HLD (hyperlipidemia)      End stage renal disease on dialysis      Pericardial effusion      Pleural effusion      Chronic systolic congestive heart failure      Anemia due to chronic kidney disease, unspecified CKD stage      Paroxysmal atrial fibrillation      Coronary artery disease, angina presence unspecified, unspecified vessel or lesion type, unspecified whether native or transplanted heart      Diabetes      End stage renal disease      Encounter for dialysis catheter care      A-V fistula      A-V fistula       DM 2, MO, hypothyroidism, prior DVT and IVC filter; Cholecystectomy    FAMILY HISTORY:  Family history of kidney disease in brother (Sibling)    [place]    Social History:Non smoker    MEDICATIONS  (STANDING):  amLODIPine   Tablet 10 milliGRAM(s) Oral daily  atorvastatin 40 milliGRAM(s) Oral at bedtime  chlorhexidine 0.12% Liquid 15 milliLiter(s) Oral Mucosa every 12 hours  chlorhexidine 2% Cloths 1 Application(s) Topical daily  dextrose 10%. 250 milliLiter(s) (1000 mL/Hr) IV Continuous <Continuous>  dextrose 50% Injectable 50 milliLiter(s) IV Push every 15 minutes  hydrALAZINE 25 milliGRAM(s) Oral three times a day  insulin regular Infusion 2 Unit(s)/Hr (2 mL/Hr) IV Continuous <Continuous>  levothyroxine 50 MICROGram(s) Oral daily  metoprolol tartrate 50 milliGRAM(s) Oral two times a day  niCARdipine Infusion 5 mG/Hr (25 mL/Hr) IV Continuous <Continuous>  pantoprazole    Tablet 40 milliGRAM(s) Oral before breakfast  piperacillin/tazobactam IVPB.. 3.375 Gram(s) IV Intermittent every 12 hours  propofol Infusion 10 MICROgram(s)/kG/Min (4.22 mL/Hr) IV Continuous <Continuous>    MEDICATIONS  (PRN):  acetaminophen     Tablet .. 650 milliGRAM(s) Oral every 6 hours PRN Temp greater or equal to 38C (100.4F), Mild Pain (1 - 3)  hydrALAZINE Injectable 10 milliGRAM(s) IV Push every 2 hours PRN SBP> 140  labetalol Injectable 10 milliGRAM(s) IV Push every 2 hours PRN SBP >140   Meds reviewed    Allergies    eggs (Anaphylaxis)  No Known Drug Allergies      Vital Signs Last 24 Hrs  T(C): 36 (05 Sep 2022 08:00), Max: 38.2 (04 Sep 2022 13:58)  T(F): 96.8 (05 Sep 2022 08:00), Max: 100.7 (04 Sep 2022 13:58)  HR: 70 (05 Sep 2022 08:00) (55 - 92)  BP: 125/62 (05 Sep 2022 08:00) (117/61 - 200/106)  BP(mean): 79 (05 Sep 2022 08:00) (75 - 97)  RR: 16 (05 Sep 2022 08:00) (12 - 21)  SpO2: 100% (05 Sep 2022 08:00) (93% - 100%)    Parameters below as of 05 Sep 2022 08:00  Patient On (Oxygen Delivery Method): ventilator    O2 Concentration (%): 40  Daily Height in cm: 160.02 (04 Sep 2022 13:58)    Daily Weight in k (04 Sep 2022 23:00)    PHYSICAL EXAM:    GENERAL: appears chronically ill  HEAD:  Atraumatic, Normocephalic  EYES: EOMI  NECK: Supple  NERVOUS SYSTEM:  Alert & Oriented X3  CHEST/LUNG: Clear to percussion bilaterally  HEART: Regular rate and rhythm  ABDOMEN: Soft, Nontender, Nondistended; +BS  EXTREMITIES:  edema      LABS:                        9.9    5.41  )-----------( 181      ( 05 Sep 2022 02:41 )             30.6     09-    129<L>  |  88<L>  |  31.9<H>  ----------------------------<  157<H>  3.4<L>   |  26.0  |  4.17<H>    Ca    8.9      05 Sep 2022 02:41  Phos  3.3       Mg     2.0         TPro  9.2<H>  /  Alb  3.4  /  TBili  0.9  /  DBili  x   /  AST  42<H>  /  ALT  30  /  AlkPhos  591<H>      PT/INR - ( 04 Sep 2022 14:19 )   PT: 12.2 sec;   INR: 1.05 ratio         PTT - ( 04 Sep 2022 14:19 )  PTT:36.8 sec    Magnesium, Serum: 2.0 mg/dL ( @ 02:41)  Phosphorus Level, Serum: 3.3 mg/dL ( @ 02:41)    ABG - ( 04 Sep 2022 21:25 )  pH, Arterial: 7.460 pH, Blood: x     /  pCO2: 42    /  pO2: 146   / HCO3: 30    / Base Excess: 6.1   /  SaO2: 99.6                  RADIOLOGY & ADDITIONAL TESTS:     HPI:  Patient is a 62y old  Female who presents with a chief complaint of hypertensive crisis/ hyperglycemia ~600/ fever TMax: 100.7 w vomiting, who was declining clinically w lethargy since in ED, CTB was obtained and extensive B/L IVH casting 3rd and 4th vents were noted. pt has been intubated shortly after CT for poor exam/ agonal breaths/ GCS 4.  per d/w son, Feliciano, unable to reach Keshia, daughter/ HCP, full code and all measures to be done, R/B?A of EVD eplained alongside possibility of worsening brain hemorrhage and he understood and consent obtained via phone.    - LOC   - trauma reported   ?Headache/ unable to obtain from pt/ per ER RN pt did not endorse headache   + Nausea /+ Vomiting  pt was dropped off to ED by granddaughter     Mode: AC/ CMV (Assist Control/ Continuous Mandatory Ventilation)  RR (machine): 16  TV (machine): 0.4  FiO2: 50  PEEP: 5  PIP: 16    Vital Signs Last 24 Hrs  T(C): 38.2 (04 Sep 2022 13:58), Max: 38.2 (04 Sep 2022 13:58)  T(F): 100.7 (04 Sep 2022 13:58), Max: 100.7 (04 Sep 2022 13:58)  HR: 73 (04 Sep 2022 17:36) (70 - 92)  BP: 134/71 (04 Sep 2022 17:36) (119/68 - 200/106)  BP(mean): --  RR: 16 (04 Sep 2022 17:36) (16 - 21)  SpO2: 100% (04 Sep 2022 17:36) (93% - 100%)    Parameters below as of 04 Sep 2022 17:36  Patient On (Oxygen Delivery Method): ventilator (04 Sep 2022 18:00)   Hx renal stones x1 not aware of type, no other renal  problems; elevated creatinine on admission.    PAST MEDICAL & SURGICAL HISTORY:  DM (diabetes mellitus)      HTN (hypertension)      HLD (hyperlipidemia)      End stage renal disease on dialysis      Pericardial effusion      Pleural effusion      Chronic systolic congestive heart failure      Anemia due to chronic kidney disease, unspecified CKD stage      Paroxysmal atrial fibrillation      Coronary artery disease, angina presence unspecified, unspecified vessel or lesion type, unspecified whether native or transplanted heart      Diabetes      End stage renal disease      Encounter for dialysis catheter care      A-V fistula      A-V fistula       DM 2, MO, hypothyroidism, prior DVT and IVC filter; Cholecystectomy    FAMILY HISTORY:  Family history of kidney disease in brother (Sibling)    [place]    Social History:Non smoker    MEDICATIONS  (STANDING):  amLODIPine   Tablet 10 milliGRAM(s) Oral daily  atorvastatin 40 milliGRAM(s) Oral at bedtime  chlorhexidine 0.12% Liquid 15 milliLiter(s) Oral Mucosa every 12 hours  chlorhexidine 2% Cloths 1 Application(s) Topical daily  dextrose 10%. 250 milliLiter(s) (1000 mL/Hr) IV Continuous <Continuous>  dextrose 50% Injectable 50 milliLiter(s) IV Push every 15 minutes  hydrALAZINE 25 milliGRAM(s) Oral three times a day  insulin regular Infusion 2 Unit(s)/Hr (2 mL/Hr) IV Continuous <Continuous>  levothyroxine 50 MICROGram(s) Oral daily  metoprolol tartrate 50 milliGRAM(s) Oral two times a day  niCARdipine Infusion 5 mG/Hr (25 mL/Hr) IV Continuous <Continuous>  pantoprazole    Tablet 40 milliGRAM(s) Oral before breakfast  piperacillin/tazobactam IVPB.. 3.375 Gram(s) IV Intermittent every 12 hours  propofol Infusion 10 MICROgram(s)/kG/Min (4.22 mL/Hr) IV Continuous <Continuous>    MEDICATIONS  (PRN):  acetaminophen     Tablet .. 650 milliGRAM(s) Oral every 6 hours PRN Temp greater or equal to 38C (100.4F), Mild Pain (1 - 3)  hydrALAZINE Injectable 10 milliGRAM(s) IV Push every 2 hours PRN SBP> 140  labetalol Injectable 10 milliGRAM(s) IV Push every 2 hours PRN SBP >140   Meds reviewed    Allergies    eggs (Anaphylaxis)  No Known Drug Allergies      Vital Signs Last 24 Hrs  T(C): 36 (05 Sep 2022 08:00), Max: 38.2 (04 Sep 2022 13:58)  T(F): 96.8 (05 Sep 2022 08:00), Max: 100.7 (04 Sep 2022 13:58)  HR: 70 (05 Sep 2022 08:00) (55 - 92)  BP: 125/62 (05 Sep 2022 08:00) (117/61 - 200/106)  BP(mean): 79 (05 Sep 2022 08:00) (75 - 97)  RR: 16 (05 Sep 2022 08:00) (12 - 21)  SpO2: 100% (05 Sep 2022 08:00) (93% - 100%)    Parameters below as of 05 Sep 2022 08:00  Patient On (Oxygen Delivery Method): ventilator    O2 Concentration (%): 40  Daily Height in cm: 160.02 (04 Sep 2022 13:58)    Daily Weight in k (04 Sep 2022 23:00)    PHYSICAL EXAM:    GENERAL: Intubated  NERVOUS SYSTEM:  sedated  CHEST/LUNG: dec bs bilaterally  HEART: Regular rate and rhythm  ABDOMEN: Soft, Nontender, Nondistended; +BS  EXTREMITIES:  + edema      LABS:                        9.9    5.41  )-----------( 181      ( 05 Sep 2022 02:41 )             30.6         129<L>  |  88<L>  |  31.9<H>  ----------------------------<  157<H>  3.4<L>   |  26.0  |  4.17<H>    Ca    8.9      05 Sep 2022 02:41  Phos  3.3       Mg     2.0         TPro  9.2<H>  /  Alb  3.4  /  TBili  0.9  /  DBili  x   /  AST  42<H>  /  ALT  30  /  AlkPhos  591<H>      PT/INR - ( 04 Sep 2022 14:19 )   PT: 12.2 sec;   INR: 1.05 ratio         PTT - ( 04 Sep 2022 14:19 )  PTT:36.8 sec    Magnesium, Serum: 2.0 mg/dL ( @ 02:41)  Phosphorus Level, Serum: 3.3 mg/dL ( @ 02:41)    ABG - ( 04 Sep 2022 21:25 )  pH, Arterial: 7.460 pH, Blood: x     /  pCO2: 42    /  pO2: 146   / HCO3: 30    / Base Excess: 6.1   /  SaO2: 99.6                  RADIOLOGY & ADDITIONAL TESTS:

## 2022-09-05 NOTE — DISCHARGE NOTE PROVIDER - NSDCFUSCHEDAPPT_GEN_ALL_CORE_FT
Lindsay Shell  [facility]  [facility]  Scheduled Appointment: 10/06/2022    Lindsay Shell  [facility]  [address]  Scheduled Appointment: 11/15/2022

## 2022-09-06 NOTE — PROGRESS NOTE ADULT - SUBJECTIVE AND OBJECTIVE BOX
CC: Follow up diabetes management     INTERVAL HPI/OVERNIGHT EVENTS:  FS 75, lantus to be decreased    ROS: Patient denies chest pain, SOB, abd pain, N/V.    MEDICATIONS  (STANDING):  amLODIPine   Tablet 10 milliGRAM(s) Oral daily  atorvastatin 40 milliGRAM(s) Oral at bedtime  chlorhexidine 0.12% Liquid 15 milliLiter(s) Oral Mucosa every 12 hours  chlorhexidine 2% Cloths 1 Application(s) Topical daily  dextrose 10%. 250 milliLiter(s) (1000 mL/Hr) IV Continuous <Continuous>  dextrose 5%. 1000 milliLiter(s) (50 mL/Hr) IV Continuous <Continuous>  dextrose 5%. 1000 milliLiter(s) (100 mL/Hr) IV Continuous <Continuous>  dextrose 50% Injectable 25 Gram(s) IV Push once  dextrose 50% Injectable 12.5 Gram(s) IV Push once  dextrose 50% Injectable 25 Gram(s) IV Push once  glucagon  Injectable 1 milliGRAM(s) IntraMuscular once  hydrALAZINE 25 milliGRAM(s) Oral three times a day  insulin lispro (ADMELOG) corrective regimen sliding scale   SubCutaneous every 6 hours  levothyroxine 75 MICROGram(s) Enteral Tube daily  metoprolol tartrate 50 milliGRAM(s) Oral two times a day  niCARdipine Infusion 5 mG/Hr (25 mL/Hr) IV Continuous <Continuous>  pantoprazole   Suspension 40 milliGRAM(s) Oral before breakfast  piperacillin/tazobactam IVPB.. 3.375 Gram(s) IV Intermittent every 12 hours    MEDICATIONS  (PRN):  acetaminophen     Tablet .. 650 milliGRAM(s) Oral every 6 hours PRN Temp greater or equal to 38C (100.4F), Mild Pain (1 - 3)  dextrose Oral Gel 15 Gram(s) Oral once PRN Blood Glucose LESS THAN 70 milliGRAM(s)/deciliter  fentaNYL    Injectable 25 MICROGram(s) IV Push every 2 hours PRN Moderate Pain (4 - 6)  hydrALAZINE Injectable 10 milliGRAM(s) IV Push every 2 hours PRN SBP> 140  labetalol Injectable 10 milliGRAM(s) IV Push every 2 hours PRN SBP >140    Allergies  eggs (Anaphylaxis)  No Known Drug Allergies    Vital Signs Last 24 Hrs  T(C): 37 (06 Sep 2022 08:30), Max: 38.4 (05 Sep 2022 18:45)  T(F): 98.6 (06 Sep 2022 08:30), Max: 101.1 (05 Sep 2022 18:45)  HR: 65 (06 Sep 2022 12:00) (54 - 91)  BP: 126/64 (06 Sep 2022 12:00) (94/55 - 158/73)  BP(mean): 82 (06 Sep 2022 12:00) (68 - 98)  RR: 18 (06 Sep 2022 12:00) (14 - 21)  SpO2: 100% (06 Sep 2022 12:00) (94% - 100%)    Parameters below as of 06 Sep 2022 12:00  Patient On (Oxygen Delivery Method): ventilator    O2 Concentration (%): 40    PHYSICAL EXAM:  General: Intubated  Neck: Supple, trachea midline, no thyromegaly  Respiratory: Course bs bilaterally  Cardiac: +S1, S2, no m/r/g  GI: +BS, soft, non tender, non distended  Extremities: No peripheral edema, no pedal lesions  Neuro: A+O X3, no tremor  Pysch: Affect appropriate   Skin: No acanthosis       LABS:                        10.9   7.42  )-----------( 221      ( 06 Sep 2022 03:45 )             35.3     09-06    135  |  96<L>  |  20.0  ----------------------------<  67<L>  4.8   |  28.0  |  3.18<H>    Ca    9.1      06 Sep 2022 03:45  Phos  3.9     09-06  Mg     2.0     09-06    TPro  9.2<H>  /  Alb  3.4  /  TBili  0.9  /  DBili  x   /  AST  42<H>  /  ALT  30  /  AlkPhos  591<H>  09-04      POCT Blood Glucose.: 99 mg/dL (09-06-22 @ 12:05)  POCT Blood Glucose.: 78 mg/dL (09-06-22 @ 09:28)  POCT Blood Glucose.: 74 mg/dL (09-06-22 @ 05:18)  POCT Blood Glucose.: 105 mg/dL (09-05-22 @ 23:28)  POCT Blood Glucose.: 168 mg/dL (09-05-22 @ 17:27)  POCT Blood Glucose.: 133 mg/dL (09-05-22 @ 13:30)    Thyroid Stimulating Hormone, Serum: 11.36 uIU/mL (09-05-22 @ 13:00)  Thyroid Stimulating Hormone, Serum: 14.44 uIU/mL (09-05-22 @ 02:41)   CC: Follow up diabetes management     INTERVAL HPI/OVERNIGHT EVENTS:  FS 75, lantus to be decreased    Unable to obtain ROS. Pt intubated    MEDICATIONS  (STANDING):  amLODIPine   Tablet 10 milliGRAM(s) Oral daily  atorvastatin 40 milliGRAM(s) Oral at bedtime  chlorhexidine 0.12% Liquid 15 milliLiter(s) Oral Mucosa every 12 hours  chlorhexidine 2% Cloths 1 Application(s) Topical daily  dextrose 10%. 250 milliLiter(s) (1000 mL/Hr) IV Continuous <Continuous>  dextrose 5%. 1000 milliLiter(s) (50 mL/Hr) IV Continuous <Continuous>  dextrose 5%. 1000 milliLiter(s) (100 mL/Hr) IV Continuous <Continuous>  dextrose 50% Injectable 25 Gram(s) IV Push once  dextrose 50% Injectable 12.5 Gram(s) IV Push once  dextrose 50% Injectable 25 Gram(s) IV Push once  glucagon  Injectable 1 milliGRAM(s) IntraMuscular once  hydrALAZINE 25 milliGRAM(s) Oral three times a day  insulin lispro (ADMELOG) corrective regimen sliding scale   SubCutaneous every 6 hours  levothyroxine 75 MICROGram(s) Enteral Tube daily  metoprolol tartrate 50 milliGRAM(s) Oral two times a day  niCARdipine Infusion 5 mG/Hr (25 mL/Hr) IV Continuous <Continuous>  pantoprazole   Suspension 40 milliGRAM(s) Oral before breakfast  piperacillin/tazobactam IVPB.. 3.375 Gram(s) IV Intermittent every 12 hours    MEDICATIONS  (PRN):  acetaminophen     Tablet .. 650 milliGRAM(s) Oral every 6 hours PRN Temp greater or equal to 38C (100.4F), Mild Pain (1 - 3)  dextrose Oral Gel 15 Gram(s) Oral once PRN Blood Glucose LESS THAN 70 milliGRAM(s)/deciliter  fentaNYL    Injectable 25 MICROGram(s) IV Push every 2 hours PRN Moderate Pain (4 - 6)  hydrALAZINE Injectable 10 milliGRAM(s) IV Push every 2 hours PRN SBP> 140  labetalol Injectable 10 milliGRAM(s) IV Push every 2 hours PRN SBP >140    Allergies  eggs (Anaphylaxis)  No Known Drug Allergies    Vital Signs Last 24 Hrs  T(C): 37 (06 Sep 2022 08:30), Max: 38.4 (05 Sep 2022 18:45)  T(F): 98.6 (06 Sep 2022 08:30), Max: 101.1 (05 Sep 2022 18:45)  HR: 65 (06 Sep 2022 12:00) (54 - 91)  BP: 126/64 (06 Sep 2022 12:00) (94/55 - 158/73)  BP(mean): 82 (06 Sep 2022 12:00) (68 - 98)  RR: 18 (06 Sep 2022 12:00) (14 - 21)  SpO2: 100% (06 Sep 2022 12:00) (94% - 100%)    Parameters below as of 06 Sep 2022 12:00  Patient On (Oxygen Delivery Method): ventilator    O2 Concentration (%): 40    PHYSICAL EXAM:  General: Intubated  Neck: Supple, trachea midline, no thyromegaly  Respiratory: Course bs bilaterally  Cardiac: +S1, S2, no m/r/g  GI: +BS, soft, non tender, non distended  Extremities: No peripheral edema, no pedal lesions  Neuro: A+O X3, no tremor  Pysch: Affect appropriate   Skin: No acanthosis       LABS:                        10.9   7.42  )-----------( 221      ( 06 Sep 2022 03:45 )             35.3     09-06    135  |  96<L>  |  20.0  ----------------------------<  67<L>  4.8   |  28.0  |  3.18<H>    Ca    9.1      06 Sep 2022 03:45  Phos  3.9     09-06  Mg     2.0     09-06    TPro  9.2<H>  /  Alb  3.4  /  TBili  0.9  /  DBili  x   /  AST  42<H>  /  ALT  30  /  AlkPhos  591<H>  09-04      POCT Blood Glucose.: 99 mg/dL (09-06-22 @ 12:05)  POCT Blood Glucose.: 78 mg/dL (09-06-22 @ 09:28)  POCT Blood Glucose.: 74 mg/dL (09-06-22 @ 05:18)  POCT Blood Glucose.: 105 mg/dL (09-05-22 @ 23:28)  POCT Blood Glucose.: 168 mg/dL (09-05-22 @ 17:27)  POCT Blood Glucose.: 133 mg/dL (09-05-22 @ 13:30)    Thyroid Stimulating Hormone, Serum: 11.36 uIU/mL (09-05-22 @ 13:00)  Thyroid Stimulating Hormone, Serum: 14.44 uIU/mL (09-05-22 @ 02:41)

## 2022-09-06 NOTE — PROGRESS NOTE ADULT - SUBJECTIVE AND OBJECTIVE BOX
HPI: 62F w/ PMH HTN, HLD, ESRD on HD (M/W/F), OP, CHF (EF=40-45%), DM, pAfib not on AC sec to GI bleed, hypothyroid, anemia of chronic disease, cirrhosis (?VELA) w/ frequent ascitis, recent admit to PMN & tx w/ Zosyn. Pt was dropped off in ER w/ hypertensive crisis/ hyperglycemia ~600/ fever TMax: 100.7 w/ vomitine & decline in MS requiring intubation for airway protection. CT showed extensive IVH/Hydro.     IVH=3, NIHSS=33, mRs=unknown (scores on admission)    Interval history: Patient with poor exam, EVD with minimal output, unable to do IT TPA due to risk of worsening bleeding of ICH. Pt with paracentesis done yesterday output of about 3 liters so far.     Vital Signs Last 24 Hrs  T(C): 37.1 (06 Sep 2022 01:00), Max: 38.4 (05 Sep 2022 18:45)  T(F): 98.8 (06 Sep 2022 01:00), Max: 101.1 (05 Sep 2022 18:45)  HR: 63 (06 Sep 2022 01:00) (60 - 91)  BP: 117/64 (06 Sep 2022 01:00) (94/55 - 153/67)  BP(mean): 80 (06 Sep 2022 01:00) (68 - 90)  RR: 17 (06 Sep 2022 01:00) (14 - 21)  SpO2: 100% (06 Sep 2022 01:00) (94% - 100%)    Parameters below as of 06 Sep 2022 00:00  Patient On (Oxygen Delivery Method): ventilator    Physical Exam:  Constitutional: Intubated, not on sedation    Neuro  * Mental Status:  Intubated  * Cranial Nerves: No EO, Pupils R oval, L round both ~4mm non-reactive, + corneals, weak gag, weak cough  * Motor: RUE no motor, LUE trace ext, b/l le tf   * Sensory: Sensation to noxious stimuli  * Reflexes: Not assessed  * Gait: Not assessed  * Wound: c/d/i, EVD not patent    < from: CT Head No Cont (09.05.22 @ 04:44) >  IMPRESSION:  A right frontal approach external ventricular drain terminates in third   ventricle.  Mild postprocedural pneumocephalus.    Severe intraventricular hemorrhage with casting the ventricular system   and moderate hydrocephalus is stable.    New mild subarachnoid hemorrhage within the cerebral hemispheres and   sylvian fissures bilaterally probably reflects recirculation of   intraventricular blood products, rather than new hemorrhage.    There is suspicion for underlying left thalamic parenchymal hemorrhage,   which is difficult to distinguish from the intraventricular hemorrhage in   the adjacent third ventricle. Follow-up MRI may be obtained for further   evaluation.    < end of copied text >

## 2022-09-06 NOTE — DIETITIAN INITIAL EVALUATION ADULT - PERTINENT LABORATORY DATA
09-06    135  |  96<L>  |  20.0  ----------------------------<  67<L>  4.8   |  28.0  |  3.18<H>    Ca    9.1      06 Sep 2022 03:45  Phos  3.9     09-06  Mg     2.0     09-06    TPro  9.2<H>  /  Alb  3.4  /  TBili  0.9  /  DBili  x   /  AST  42<H>  /  ALT  30  /  AlkPhos  591<H>  09-04  POCT Blood Glucose.: 78 mg/dL (09-06-22 @ 09:28)  A1C with Estimated Average Glucose Result: 10.6 % (09-05-22 @ 02:41)  A1C with Estimated Average Glucose Result: 9.5 % (08-01-22 @ 03:11)  A1C with Estimated Average Glucose Result: 7.8 % (05-22-22 @ 03:32)

## 2022-09-06 NOTE — PROGRESS NOTE ADULT - ASSESSMENT
62F with IVH / hydrocephalus, EVD placement     Plan   - q1 neuro checks   - EVD open at 0cmh20   - -140   - Okay for diet   - SCDs only for dvt ppx   - Palliative consult   - Further plan pending am rounds with attending

## 2022-09-06 NOTE — PROGRESS NOTE ADULT - ASSESSMENT
61 yo F with multiple co-morbidities, presented with large IVH/ obstructive hydrocephalus, concern for L BG ICH, score 3. S/p emergent EVD placement.  Nonocclusive thrombus in the proximal right basilar artery. Occluded left vertebral artery distal V4 segment.  H/o CAD on ASA; Afib not on AC due to GIB. Mixed CHF.  Acute resp failure. PNA.  Cirrhosis with moderate ascites. S/p paracentesis 9/5.  ESRD on HD.  Hyperglycemia, DM.    Plan  cont neurochecks, off sedation, PRN fentanyl 25 for agitation/vent dyssynchrony  maintain EVD @0, monitor outp and ICP  vent support, adjust as needed, SBT as tolerated  HD on Wed, Renal involvement appreciated  change TF in view of hypoglycemia events, BM regimen, PPI for ppx  SCDs, no AC in view of brain bleed  SSI for now, monitor FS  cont Zosyn, follow Cx, ascitic fluid   GOC discussion with the family members today  Full code for now           61 yo F with multiple co-morbidities, presented with large IVH/ obstructive hydrocephalus, concern for L BG ICH, score 3. S/p emergent EVD placement.  Nonocclusive thrombus in the proximal right basilar artery. Occluded left vertebral artery distal V4 segment.  H/o CAD on ASA; Afib not on AC due to GIB. Mixed CHF.  Acute resp failure. PNA.  Cirrhosis with moderate ascites. S/p paracentesis 9/5.  ESRD on HD.  Hyperglycemia, DM.    Plan  cont neurochecks, off sedation, PRN fentanyl 25 for agitation/vent dyssynchrony  maintain EVD @0, monitor outp and ICP  vent support, adjust as needed, SBT as tolerated  HD on Wed, Renal involvement appreciated  change TF in view of hypoglycemia events, BM regimen, PPI for ppx  SCDs, no AC in view of brain bleed  SSI for now, monitor FS  cont Zosyn for 7 days, follow Cx, ascitic fluid   GOC discussion with the family members today  Full code for now

## 2022-09-06 NOTE — PROGRESS NOTE ADULT - ASSESSMENT
62y old  Female who presents with a chief complaint of hypertensive crisis/ hyperglycemia ~600/ fever TMax: 100.7 w vomiting, who was declining clinically w lethargy since in ED, CTB was obtained and extensive B/L IVH casting 3rd and 4th vents were noted. pt has been intubated shortly after CT for poor exam/ agonal breaths/ GCS 4.    ESRD  Will arrange dialysis MWF  HTN emergency  :  BP now controlled- on Cardene > PO meds    Large -IVH/ obstructive hydrocephalus, -->  intubated/ Emergent EVD placed  NS noted    Guarded prognosis

## 2022-09-06 NOTE — CONSULT NOTE ADULT - REASON FOR ADMISSION
severe IVH  + ASA 82m PTA

## 2022-09-06 NOTE — DIETITIAN INITIAL EVALUATION ADULT - CALCULATED TO (G/KG)
Currently in Pain: Denies  Vital Signs  Patient Currently in Pain: Denies  Pre Treatment Pain Screening  Intervention List: Patient able to continue with treatment    Orientation  Orientation  Overall Orientation Status: Within Functional Limits    Social/Functional History  Social/Functional History  Lives With: Family (spouse, daughter and grandaughter)  Type of Home: House  Home Layout: Two level, Able to Live on Main level with bedroom/bathroom  Home Access: Stairs to enter without rails  Entrance Stairs - Number of Steps: 2 (\"small steps\")  Bathroom Shower/Tub: Tub/Shower unit  Bathroom Equipment:  (only towel rack in shower)  Bathroom Accessibility: Accessible  Home Equipment:  (rollator, pt reports using it for grocery shopping but not at home.)  Receives Help From: Family  ADL Assistance: Independent  Homemaking Assistance: Needs assistance  Homemaking Responsibilities: Yes  Ambulation Assistance: Independent  Transfer Assistance: Independent  Active : No  Patient's  Info: , daughter  Objective          AROM RLE (degrees)  RLE AROM: WFL  AROM LLE (degrees)  LLE AROM : WFL  AROM RUE (degrees)  RUE AROM : WFL  AROM LUE (degrees)  LUE AROM : WFL  Strength RLE  Strength RLE: WFL  Strength LLE  Strength LLE: WFL  Strength RUE  Strength RUE: WFL  Strength LUE  Strength LUE: WFL  Strength Other  Other: sergey 4-/5, Pt reports weakness as chief limitation with ambulation     Sensation  Overall Sensation Status: WFL  Bed mobility  Supine to Sit: Minimal assistance  Sit to Supine: Minimal assistance  Scooting: Minimal assistance  Transfers  Sit to Stand: Minimal Assistance  Stand to sit: Contact guard assistance  Ambulation  Ambulation?: Yes  Ambulation 1  Surface: level tile  Device: Rolling Walker  Assistance: Contact guard assistance  Quality of Gait: very slow, flexed posture, cues to look up.   Distance: 10'  Comments: Pt limited by weakness, endurance  Stairs/Curb  Stairs?: No Balance  Posture: Fair  Sitting - Static: Good  Sitting - Dynamic: Good;-  Standing - Static: Fair;+ (RW)  Standing - Dynamic: Fair (RW)  Exercises  Comments: EOB 8min     Assessment   Body structures, Functions, Activity limitations: Decreased functional mobility ; Decreased strength;Decreased endurance  Assessment: amb 10' RW CGA. Pt limited by faitgue, endurance. Functional mobility without 02, Pt to 87% Sa02, returned to 02. SNF  Prognosis: Good  Decision Making: Medium Complexity  REQUIRES PT FOLLOW UP: Yes  Activity Tolerance  Activity Tolerance: Patient Tolerated treatment well;Patient limited by fatigue;Patient limited by endurance     Discharge Recommendations:  [address] (Pt in agreement to placement prior to d/c home due to lack of 24/7 assistance.)      Plan   Plan  Times per week: 5-6x/wk  Times per day: Daily  Current Treatment Recommendations: Strengthening, ROM, Gait Training, Functional Mobility Training, Balance Training, Endurance Training, Transfer Training, Home Exercise Program, Safety Education & Training, Patient/Caregiver Education & Training, Equipment Evaluation, Education, & procurement  Safety Devices  Type of devices: Nurse notified, Call light within reach, Left in bed, Patient at risk for falls, Gait belt  Restraints  Initially in place: No    G-Code  PT G-Codes  Functional Assessment Tool Used: [place] Tool  Score: 13  Functional Limitation: Mobility: Walking and moving around  Mobility: Walking and Moving Around Current Status (): At least 40 percent but less than 60 percent impaired, limited or restricted  Mobility: Walking and Moving Around Goal Status ():  At least 20 percent but less than 40 percent impaired, limited or restricted    OutComes Score                                             Goals  Short term goals  Time Frame for Short term goals: 14 visits  Short term goal 1: Pt will be I with bed mobility  Short term goal 2: Pt will be I with transfers  Short term goal 3: Pt will be I with amb 300' with least restrictive AD.        Therapy Time   Individual Concurrent Group Co-treatment   Time In 1330         Time Out 1353         Minutes Isaac Buck [facility], [place] 83.36

## 2022-09-06 NOTE — DIETITIAN INITIAL EVALUATION ADULT - OTHER INFO
62 year old male with PMH of HTN, HLD, ESRD on HD, OP, CHF, DM, pAfib not on AC sec to GI bleed, hypothyroid, anemia of chronic disease, cirrhosis w/ frequent ascites, presents to ED w/ hypertensive crisis/hyperglycemia ~600/ fever TMax: 100.7 w/ vomiting & decline in MS requiring intubation for airway protection. CT showed extensive IVH, hydrocephalus, s/p EVD placement.     Pt remains intubated at this time. Glucerna 1.5 ordered for goal rate of 50 ml/hr (x20 hrs) which provides 1000 ml, 1500 kcal, 83g protein, 759 ml free water, and 100% of RDIs for vitamins/minerals. Palliative following for GOC, aware of poor prognosis.

## 2022-09-06 NOTE — DIETITIAN INITIAL EVALUATION ADULT - NSFNSGIIOFT_GEN_A_CORE
09-05-22 @ 07:01  -  09-06-22 @ 07:00  --------------------------------------------------------  OUT:    Nasogastric/Oral tube (mL): 350 mL  Total OUT: 350 mL    Total NET: -70 mL

## 2022-09-06 NOTE — DIETITIAN INITIAL EVALUATION ADULT - NS FNS DIET ORDER
Diet, NPO with Tube Feed:   Tube Feeding Modality: Orogastric  Glucerna 1.5 Maurice (GLUCERNA1.5)  Continuous  Starting Tube Feed Rate {mL per Hour}: 10  Increase Tube Feed Rate by (mL): 10     Every 4 hours  Until Goal Tube Feed Rate (mL per Hour): 50  Tube Feed Duration (in Hours): 24  Tube Feed Start Time: 09:00 (09-05-22 @ 08:59)

## 2022-09-06 NOTE — PROGRESS NOTE ADULT - SUBJECTIVE AND OBJECTIVE BOX
HPI:  Patient is a 62y old  Female who presents with a chief complaint of hypertensive crisis/ hyperglycemia ~600/ fever TMax: 100.7 w vomiting, who was declining clinically w lethargy since in ED, CTB was obtained and extensive B/L IVH casting 3rd and 4th vents were noted. pt has been intubated shortly after CT for poor exam/ agonal breaths/ GCS 4.  per d/w son, Feliciano, unable to reach Keshia, daughter/ HCP, full code and all measures to be done, R/B?A of EVD eplained alongside possibility of worsening hemorrhage and he understood and consent obtained via phone.    - LOC   - trauma reported   ?Headache/ unable to obtain from pt/ per ER RN pt did not endorse headache   + Nausea /+ Vomiting  pt was dropped off to ED by granddaughter   On admission, the patient was:  GCS: 4t.  ICH score: 3.    O/n events: none reported.  VS, labs, imaging reviewed.    EXAMINATION:  General:  calm, NAD. Off sedation.  Neuro:  comatose, pupils NR R5mm L 4 mm, no corneals, no dolls, + cough, o/b the vent, not FC, motor - flaccid x4.  Cards:  S1S2 present.  Respiratory:  no respiratory distress, diminished on the R.  Abdomen:  soft.  Extremities:  mild pitting edema  Skin:  warm/dry

## 2022-09-06 NOTE — DIETITIAN INITIAL EVALUATION ADULT - PERTINENT MEDS FT
MEDICATIONS  (STANDING):  amLODIPine   Tablet 10 milliGRAM(s) Oral daily  atorvastatin 40 milliGRAM(s) Oral at bedtime  chlorhexidine 0.12% Liquid 15 milliLiter(s) Oral Mucosa every 12 hours  chlorhexidine 2% Cloths 1 Application(s) Topical daily  dextrose 10%. 250 milliLiter(s) (1000 mL/Hr) IV Continuous <Continuous>  dextrose 5%. 1000 milliLiter(s) (50 mL/Hr) IV Continuous <Continuous>  dextrose 5%. 1000 milliLiter(s) (100 mL/Hr) IV Continuous <Continuous>  dextrose 50% Injectable 25 Gram(s) IV Push once  dextrose 50% Injectable 12.5 Gram(s) IV Push once  dextrose 50% Injectable 25 Gram(s) IV Push once  glucagon  Injectable 1 milliGRAM(s) IntraMuscular once  hydrALAZINE 25 milliGRAM(s) Oral three times a day  insulin lispro (ADMELOG) corrective regimen sliding scale   SubCutaneous every 6 hours  levothyroxine 75 MICROGram(s) Enteral Tube daily  metoprolol tartrate 50 milliGRAM(s) Oral two times a day  niCARdipine Infusion 5 mG/Hr (25 mL/Hr) IV Continuous <Continuous>  pantoprazole    Tablet 40 milliGRAM(s) Oral before breakfast  piperacillin/tazobactam IVPB.. 3.375 Gram(s) IV Intermittent every 12 hours    MEDICATIONS  (PRN):  acetaminophen     Tablet .. 650 milliGRAM(s) Oral every 6 hours PRN Temp greater or equal to 38C (100.4F), Mild Pain (1 - 3)  dextrose Oral Gel 15 Gram(s) Oral once PRN Blood Glucose LESS THAN 70 milliGRAM(s)/deciliter  hydrALAZINE Injectable 10 milliGRAM(s) IV Push every 2 hours PRN SBP> 140  labetalol Injectable 10 milliGRAM(s) IV Push every 2 hours PRN SBP >140

## 2022-09-06 NOTE — CONSULT NOTE ADULT - ASSESSMENT
62yr old  woman with Past MHX   ESRD on HD, OP, CHF, DM, PAF  hypertension, hyperlipidemia. anemia,  cirrhosis admitted with IVH with poor neurological exam      IVH      Cirrhosis/Ascites      ESRD    Encounter for Palliative Care 62yr old  woman with Past MHX   ESRD on HD, OP, CHF, DM, PAF  hypertension, hyperlipidemia. anemia,  cirrhosis admitted with IVH with poor neurological exam      IVH  poor neurological status  neurochekcs  plan for     Respiratory Failure  on vent - wean per NSICU    Cirrhosis/Ascites  on IV Zosyn - NGTD  follow up final cultures    ESRD  HD per renal    Encounter for Palliative Care  Palliative Care consulted to assist with goals of care.  Patient with IVH with poor neurological outlook  Called daughter Keshia - voice mail received.    Would recommend family meeting to discuss further GOC.

## 2022-09-06 NOTE — CONSULT NOTE ADULT - TIME BILLING
Review of chart documents, labs, imaging. Direct patient assessment,  formulation of care plan. Discussion with  Interdisciplinary  team  NSICU, nurse

## 2022-09-06 NOTE — CONSULT NOTE ADULT - SUBJECTIVE AND OBJECTIVE BOX
HPI:  Patient is a 62y old  Female who presents with a chief complaint of hypertensive crisis/ hyperglycemia ~600/ fever TMax: 100.7 w vomiting, who was declining clinically w lethargy since in ED, CTB was obtained and extensive B/L IVH casting 3rd and 4th vents were noted. pt has been intubated shortly after CT for poor exam/ agonal breaths/ GCS 4.  per d/w son, Feliciano, unable to reach Keshia, daughter/ HCP, full code and all measures to be done, R/B?A of EVD eplained alongside possibility of worsening hemorrhage and he understood and consent obtained via phone.    - LOC   - trauma reported   ?Headache/ unable to obtain from pt/ per ER RN pt did not endorse headache   + Nausea /+ Vomiting  pt was dropped off to ED by granddaughter       Vital Signs Last 24 Hrs  T(C): 38.2 (04 Sep 2022 13:58), Max: 38.2 (04 Sep 2022 13:58)  T(F): 100.7 (04 Sep 2022 13:58), Max: 100.7 (04 Sep 2022 13:58)  HR: 73 (04 Sep 2022 17:36) (70 - 92)  BP: 134/71 (04 Sep 2022 17:36) (119/68 - 200/106)  BP(mean): --  RR: 16 (04 Sep 2022 17:36) (16 - 21)  SpO2: 100% (04 Sep 2022 17:36) (93% - 100%)    Parameters below as of 04 Sep 2022 17:36  Patient On (Oxygen Delivery Method): ventilator (04 Sep 2022 18:00)      PERTINENT PMH REVIEWED: Yes No    PAST MEDICAL & SURGICAL HISTORY:  DM (diabetes mellitus)      HTN (hypertension)      HLD (hyperlipidemia)      End stage renal disease on dialysis      Pericardial effusion      Pleural effusion      Chronic systolic congestive heart failure      Anemia due to chronic kidney disease, unspecified CKD stage      Paroxysmal atrial fibrillation      Coronary artery disease, angina presence unspecified, unspecified vessel or lesion type, unspecified whether native or transplanted heart      Diabetes      End stage renal disease      Encounter for dialysis catheter care      A-V fistula      A-V fistula      SOCIAL HISTORY:                      Substance history:                    Admitted from:  home  [facility]                     Latter-day/spirituality:                    Cultural concerns:                      Surrogate/HCP/Guardian: Phone#:    FAMILY HISTORY:  Family history of kidney disease in brother (Sibling)        Allergies    eggs (Anaphylaxis)  No Known Drug Allergies    Intolerances        ADVANCE DIRECTIVES/TREATMENT PREFERENCES:  Full code, all aggressive measures desired       Baseline ADLs (prior to admission):  Independent/ Dependent      http://[email or web address]/[email or web address]    Present Symptoms:   Dyspnea:  No Mild Moderate Severe  Nausea/Vomiting:  No  Yes  Anxiety:   No  Yes  Depression: No Yes Unable  Fatigue: Yes No Unable  Loss of appetite: Yes No  N/A  NPO  Constipation:  Not reported   Yes    Pain:  No  No signs            Character-            Duration-            Factors-            Severity    Pain AD Score:  [email or web address] (press ctrl + left click to view)    Review of Systems: Reviewed                     Negative:                     Positive:  Unable to obtain due to poor mentation   All other ROS negative    MEDICATIONS  (STANDING):  amLODIPine   Tablet 10 milliGRAM(s) Oral daily  atorvastatin 40 milliGRAM(s) Oral at bedtime  chlorhexidine 0.12% Liquid 15 milliLiter(s) Oral Mucosa every 12 hours  chlorhexidine 2% Cloths 1 Application(s) Topical daily  dextrose 10%. 250 milliLiter(s) (1000 mL/Hr) IV Continuous <Continuous>  dextrose 5%. 1000 milliLiter(s) (50 mL/Hr) IV Continuous <Continuous>  dextrose 5%. 1000 milliLiter(s) (100 mL/Hr) IV Continuous <Continuous>  dextrose 50% Injectable 25 Gram(s) IV Push once  dextrose 50% Injectable 12.5 Gram(s) IV Push once  dextrose 50% Injectable 25 Gram(s) IV Push once  glucagon  Injectable 1 milliGRAM(s) IntraMuscular once  hydrALAZINE 25 milliGRAM(s) Oral three times a day  insulin lispro (ADMELOG) corrective regimen sliding scale   SubCutaneous every 6 hours  levothyroxine 75 MICROGram(s) Enteral Tube daily  metoprolol tartrate 50 milliGRAM(s) Oral two times a day  niCARdipine Infusion 5 mG/Hr (25 mL/Hr) IV Continuous <Continuous>  pantoprazole   Suspension 40 milliGRAM(s) Oral before breakfast  piperacillin/tazobactam IVPB.. 3.375 Gram(s) IV Intermittent every 12 hours    MEDICATIONS  (PRN):  acetaminophen     Tablet .. 650 milliGRAM(s) Oral every 6 hours PRN Temp greater or equal to 38C (100.4F), Mild Pain (1 - 3)  dextrose Oral Gel 15 Gram(s) Oral once PRN Blood Glucose LESS THAN 70 milliGRAM(s)/deciliter  fentaNYL    Injectable 25 MICROGram(s) IV Push every 2 hours PRN Moderate Pain (4 - 6)  hydrALAZINE Injectable 10 milliGRAM(s) IV Push every 2 hours PRN SBP> 140  labetalol Injectable 10 milliGRAM(s) IV Push every 2 hours PRN SBP >140      PHYSICAL EXAM:    Vital Signs Last 24 Hrs  T(C): 37 (06 Sep 2022 08:30), Max: 38.4 (05 Sep 2022 18:45)  T(F): 98.6 (06 Sep 2022 08:30), Max: 101.1 (05 Sep 2022 18:45)  HR: 66 (06 Sep 2022 13:15) (54 - 91)  BP: 116/62 (06 Sep 2022 13:00) (94/55 - 158/73)  BP(mean): 78 (06 Sep 2022 13:00) (68 - 98)  RR: 16 (06 Sep 2022 13:15) (14 - 21)  SpO2: 98% (06 Sep 2022 13:15) (94% - 100%)    Parameters below as of 06 Sep 2022 12:00  Patient On (Oxygen Delivery Method): ventilator    O2 Concentration (%): 40    Karnofsky     %  General:    HEENT: NCAT      (  )  ET tube   (    ) NGT  Lungs: comfortable  CV:   GI:           (  )  PEG  :   MSK: normal   weak  chair/bedbound  Neuro:   Skin: warm dry    LABS:                        10.9   7.42  )-----------( 221      ( 06 Sep 2022 03:45 )             35.3     09-06    135  |  96<L>  |  20.0  ----------------------------<  67<L>  4.8   |  28.0  |  3.18<H>    Ca    9.1      06 Sep 2022 03:45  Phos  3.9     09-06  Mg     2.0     09-06    TPro  9.2<H>  /  Alb  3.4  /  TBili  0.9  /  DBili  x   /  AST  42<H>  /  ALT  30  /  AlkPhos  591<H>  09-04    PT/INR - ( 04 Sep 2022 14:19 )   PT: 12.2 sec;   INR: 1.05 ratio         PTT - ( 04 Sep 2022 14:19 )  PTT:36.8 sec    I&O's Summary    05 Sep 2022 07:01  -  06 Sep 2022 07:00  --------------------------------------------------------  IN: 1142.5 mL / OUT: 5655 mL / NET: -4512.5 mL    06 Sep 2022 07:01  -  06 Sep 2022 13:44  --------------------------------------------------------  IN: 357.5 mL / OUT: 0 mL / NET: 357.5 mL        RADIOLOGY & ADDITIONAL STUDIES:  < from: CT Angio Neck w/ IV Cont (09.04.22 @ 20:36) >      CTA BRAIN:  Subtle filling defects in the proximal right basilar artery suggesting   intraluminal thrombus. Absence of flow in the distal left vertebral   artery V4 segment suggesting occlusion  The [place] of [place] and vertebrobasilar system are otherwise   unremarkable without evidence of stenosis, other occlusion or saccular   aneurysm dilation. No evidence for arterial venous malformation. The   vertebral arteries are codominant.      CTA NECK:  A left-sided aortic arch is demonstrated. There is normal relationship to   the great vessels. The common carotid arteries, internal carotid arteries   and vertebral arteries are normal in caliber. No evidence of stenosis,   occlusion or saccular aneurysm dilation. The vertebral arteries are   codominant.      Right lower lobe pneumonia. Cardiomegaly. Dilated main pulmonary artery.    IMPRESSION:    Nonocclusive thrombus in the proximal right basilar artery. Occluded left   vertebral artery distal V4 segment. Findings discussed with ICU KRIS Swenson.    --- End of Report ---    < end of copied text >    < from: CT Head No Cont (09.05.22 @ 04:44) >  The calvarium, skull base and orbits appear unremarkable.    IMPRESSION:  A right frontal approach external ventricular drain terminates in third   ventricle.  Mild postprocedural pneumocephalus.    Severe intraventricular hemorrhage with casting the ventricular system   and moderate hydrocephalus is stable.    New mild subarachnoid hemorrhage within the cerebral hemispheres and   sylvian fissures bilaterally probably reflects recirculation of   intraventricular blood products, rather than new hemorrhage.    There is suspicion for underlying left thalamic parenchymal hemorrhage,   which is difficult to distinguish from the intraventricular hemorrhage in   the adjacent third ventricle. Follow-up MRI may be obtained for further     < end of copied text >           HPI:  Unable to obtain secondary to poor mentation/historian  Patient is a 62y old  Female who presents with a chief complaint of hypertensive crisis/ hyperglycemia ~600/ fever TMax: 100.7 w vomiting, who was declining clinically w lethargy since in ED, CTB was obtained and extensive B/L IVH casting 3rd and 4th vents were noted. pt has been intubated shortly after CT for poor exam/ agonal breaths/ GCS 4.  per d/w son, Feliciano, unable to reach Keshia, daughter/ HCP, full code and all measures to be done, R/B?A of EVD eplained alongside possibility of worsening hemorrhage and he understood and consent obtained via phone.    - LOC   - trauma reported   ?Headache/ unable to obtain from pt/ per ER RN pt did not endorse headache   + Nausea /+ Vomiting  pt was dropped off to ED by granddaughter       Vital Signs Last 24 Hrs  T(C): 38.2 (04 Sep 2022 13:58), Max: 38.2 (04 Sep 2022 13:58)  T(F): 100.7 (04 Sep 2022 13:58), Max: 100.7 (04 Sep 2022 13:58)  HR: 73 (04 Sep 2022 17:36) (70 - 92)  BP: 134/71 (04 Sep 2022 17:36) (119/68 - 200/106)  BP(mean): --  RR: 16 (04 Sep 2022 17:36) (16 - 21)  SpO2: 100% (04 Sep 2022 17:36) (93% - 100%)    Parameters below as of 04 Sep 2022 17:36  Patient On (Oxygen Delivery Method): ventilator (04 Sep 2022 18:00)      PERTINENT PMH REVIEWED: Yes     PAST MEDICAL & SURGICAL HISTORY:  DM (diabetes mellitus)      HTN (hypertension)      HLD (hyperlipidemia)      End stage renal disease on dialysis      Pericardial effusion      Pleural effusion      Chronic systolic congestive heart failure      Anemia due to chronic kidney disease, unspecified CKD stage      Paroxysmal atrial fibrillation    Coronary artery disease, angina presence unspecified, unspecified vessel or lesion type, unspecified whether native or transplanted heart      Diabetes    A-V fistula            SOCIAL HISTORY:  Unable to obtain secondary to poor mentation/historian                    Substance history:                    Admitted from:  home  [facility]                     Mandaen/spirituality:  Sabianist                    Cultural concerns:                      Surrogate/HCP/Guardian: Phone#: Daughter Keshia- number in [place]    FAMILY HISTORY:  Family history of kidney disease in brother (Sibling)        Allergies    eggs (Anaphylaxis)  No Known Drug Allergies    Intolerances    ADVANCE DIRECTIVES/TREATMENT PREFERENCES:  Current code status  Full code, all aggressive measures desired       Baseline ADLs (prior to admission):  Unable to obtain secondary to poor mentation/historian  Independent/ Dependent      [email or web address]    Present Symptoms:   Dyspnea:  No on vent  Nausea/Vomiting:  No    Anxiety:   No   Depression: Unable  Fatigue: Yes No Unable  Loss of appetite:  N/A  NPO  Constipation:  Not reported      Pain:    No signs            Character-            Duration-            Factors-            Severity    Pain AD Score:  [email or web address] (press ctrl + left click to view)    Review of Systems: Reviewed                  Unable to obtain due to poor mentation       MEDICATIONS  (STANDING):  amLODIPine   Tablet 10 milliGRAM(s) Oral daily  atorvastatin 40 milliGRAM(s) Oral at bedtime  chlorhexidine 0.12% Liquid 15 milliLiter(s) Oral Mucosa every 12 hours  chlorhexidine 2% Cloths 1 Application(s) Topical daily  dextrose 10%. 250 milliLiter(s) (1000 mL/Hr) IV Continuous <Continuous>  dextrose 5%. 1000 milliLiter(s) (50 mL/Hr) IV Continuous <Continuous>  dextrose 5%. 1000 milliLiter(s) (100 mL/Hr) IV Continuous <Continuous>  dextrose 50% Injectable 25 Gram(s) IV Push once  dextrose 50% Injectable 12.5 Gram(s) IV Push once  dextrose 50% Injectable 25 Gram(s) IV Push once  glucagon  Injectable 1 milliGRAM(s) IntraMuscular once  hydrALAZINE 25 milliGRAM(s) Oral three times a day  insulin lispro (ADMELOG) corrective regimen sliding scale   SubCutaneous every 6 hours  levothyroxine 75 MICROGram(s) Enteral Tube daily  metoprolol tartrate 50 milliGRAM(s) Oral two times a day  niCARdipine Infusion 5 mG/Hr (25 mL/Hr) IV Continuous <Continuous>  pantoprazole   Suspension 40 milliGRAM(s) Oral before breakfast  piperacillin/tazobactam IVPB.. 3.375 Gram(s) IV Intermittent every 12 hours    MEDICATIONS  (PRN):  acetaminophen     Tablet .. 650 milliGRAM(s) Oral every 6 hours PRN Temp greater or equal to 38C (100.4F), Mild Pain (1 - 3)  dextrose Oral Gel 15 Gram(s) Oral once PRN Blood Glucose LESS THAN 70 milliGRAM(s)/deciliter  fentaNYL    Injectable 25 MICROGram(s) IV Push every 2 hours PRN Moderate Pain (4 - 6)  hydrALAZINE Injectable 10 milliGRAM(s) IV Push every 2 hours PRN SBP> 140  labetalol Injectable 10 milliGRAM(s) IV Push every 2 hours PRN SBP >140      PHYSICAL EXAM:    Vital Signs Last 24 Hrs  T(C): 37 (06 Sep 2022 08:30), Max: 38.4 (05 Sep 2022 18:45)  T(F): 98.6 (06 Sep 2022 08:30), Max: 101.1 (05 Sep 2022 18:45)  HR: 66 (06 Sep 2022 13:15) (54 - 91)  BP: 116/62 (06 Sep 2022 13:00) (94/55 - 158/73)  BP(mean): 78 (06 Sep 2022 13:00) (68 - 98)  RR: 16 (06 Sep 2022 13:15) (14 - 21)  SpO2: 98% (06 Sep 2022 13:15) (94% - 100%)    Parameters below as of 06 Sep 2022 12:00  Patient On (Oxygen Delivery Method): ventilator    O2 Concentration (%): 40    Karnofsky  10   %  General:  F thin NAD intubated  HEENT: NCAT      ( x )  ET tube   (  x  ) OGT  Lungs: comfortable  CV: RR  GI:  soft NTND           : normal  MSK:  weak  chair/bedbound  Neuro: non responsive to voice, does not follow commands  Skin: warm dry    LABS:                        10.9   7.42  )-----------( 221      ( 06 Sep 2022 03:45 )             35.3     09-06    135  |  96<L>  |  20.0  ----------------------------<  67<L>  4.8   |  28.0  |  3.18<H>    Ca    9.1      06 Sep 2022 03:45  Phos  3.9     09-06  Mg     2.0     09-06    TPro  9.2<H>  /  Alb  3.4  /  TBili  0.9  /  DBili  x   /  AST  42<H>  /  ALT  30  /  AlkPhos  591<H>  09-04    PT/INR - ( 04 Sep 2022 14:19 )   PT: 12.2 sec;   INR: 1.05 ratio         PTT - ( 04 Sep 2022 14:19 )  PTT:36.8 sec    I&O's Summary    05 Sep 2022 07:01  -  06 Sep 2022 07:00  --------------------------------------------------------  IN: 1142.5 mL / OUT: 5655 mL / NET: -4512.5 mL    06 Sep 2022 07:01  -  06 Sep 2022 13:44  --------------------------------------------------------  IN: 357.5 mL / OUT: 0 mL / NET: 357.5 mL        RADIOLOGY & ADDITIONAL STUDIES:  < from: CT Angio Neck w/ IV Cont (09.04.22 @ 20:36) >      CTA BRAIN:  Subtle filling defects in the proximal right basilar artery suggesting   intraluminal thrombus. Absence of flow in the distal left vertebral   artery V4 segment suggesting occlusion  The [place] of [place] and vertebrobasilar system are otherwise   unremarkable without evidence of stenosis, other occlusion or saccular   aneurysm dilation. No evidence for arterial venous malformation. The   vertebral arteries are codominant.      CTA NECK:  A left-sided aortic arch is demonstrated. There is normal relationship to   the great vessels. The common carotid arteries, internal carotid arteries   and vertebral arteries are normal in caliber. No evidence of stenosis,   occlusion or saccular aneurysm dilation. The vertebral arteries are   codominant.      Right lower lobe pneumonia. Cardiomegaly. Dilated main pulmonary artery.    IMPRESSION:    Nonocclusive thrombus in the proximal right basilar artery. Occluded left   vertebral artery distal V4 segment. Findings discussed with ICU KRIS Swenson.    --- End of Report ---    < end of copied text >    < from: CT Head No Cont (09.05.22 @ 04:44) >  The calvarium, skull base and orbits appear unremarkable.    IMPRESSION:  A right frontal approach external ventricular drain terminates in third   ventricle.  Mild postprocedural pneumocephalus.    Severe intraventricular hemorrhage with casting the ventricular system   and moderate hydrocephalus is stable.    New mild subarachnoid hemorrhage within the cerebral hemispheres and   sylvian fissures bilaterally probably reflects recirculation of   intraventricular blood products, rather than new hemorrhage.    There is suspicion for underlying left thalamic parenchymal hemorrhage,   which is difficult to distinguish from the intraventricular hemorrhage in   the adjacent third ventricle. Follow-up MRI may be obtained for further     < end of copied text >

## 2022-09-06 NOTE — CONSULT NOTE ADULT - CONSULT REASON
IVH
IVH, hypertensive crisis/ hyperglycemic/ vomiting, + [id number]   last HD on Friday 9/2
IVH poor prognosis
MARSHALL
diabetes management

## 2022-09-06 NOTE — PROGRESS NOTE ADULT - ASSESSMENT
63yo F with extensive medical problems, p/w hemorrhagic stroke, IVH/hydrocephalus brain compression requiring EVD placement. On admission a1c was 10.6%, endo consulted for same.     1. T2DM, a1c 10.6%  - FS 74 this am  - Decrease Lantus to 8 units qhs  - Continue SSI    2. Hypothyroidism - TSH 14  - LT4 75 mcg PO daily  - Recheck levels in 4 weeks as out patient    3. HTN emergency  - Nicardipine infusion

## 2022-09-06 NOTE — PROGRESS NOTE ADULT - SUBJECTIVE AND OBJECTIVE BOX
Patient seen and examined    I&O's Summary    05 Sep 2022 07:01  -  06 Sep 2022 07:00  --------------------------------------------------------  IN: 1142.5 mL / OUT: 5655 mL / NET: -4512.5 mL    06 Sep 2022 07:01  -  06 Sep 2022 12:55  --------------------------------------------------------  IN: 245 mL / OUT: 0 mL / NET: 245 mL    Remains vented  Unresponsive  s/p EVD placement    REVIEW OF SYSTEMS:  NA    Vital Signs Last 24 Hrs  T(C): 37 (06 Sep 2022 08:30), Max: 38.4 (05 Sep 2022 18:45)  T(F): 98.6 (06 Sep 2022 08:30), Max: 101.1 (05 Sep 2022 18:45)  HR: 65 (06 Sep 2022 12:00) (54 - 91)  BP: 126/64 (06 Sep 2022 12:00) (94/55 - 158/73)  BP(mean): 82 (06 Sep 2022 12:00) (68 - 98)  RR: 18 (06 Sep 2022 12:00) (14 - 21)  SpO2: 100% (06 Sep 2022 12:00) (94% - 100%)    Parameters below as of 06 Sep 2022 12:00  Patient On (Oxygen Delivery Method): ventilator    O2 Concentration (%): 40    PHYSICAL EXAM:    GENERAL: NAD,   EYES:  conjunctiva and sclera clear  NECK: Supple, No JVD/Bruit  NERVOUS SYSTEM:  No response ,   CHEST:  No rales or rhonchi  HEART:  RRR, No murmur  ABDOMEN: Soft, NT/ND BS+  EXTREMITIES:  No Edema;  SKIN: No rashes    LABS:                          10.9   7.42  )-----------( 221      ( 06 Sep 2022 03:45 )             35.3     09-06    135  |  96<L>  |  20.0  ----------------------------<  67<L>  4.8   |  28.0  |  3.18<H>    Ca    9.1      06 Sep 2022 03:45  Phos  3.9     09-06  Mg     2.0     09-06    TPro  9.2<H>  /  Alb  3.4  /  TBili  0.9  /  DBili  x   /  AST  42<H>  /  ALT  30  /  AlkPhos  591<H>  09-04      MEDICATIONS  (STANDING):  acetaminophen     Tablet .. PRN  amLODIPine   Tablet  atorvastatin  chlorhexidine 0.12% Liquid  chlorhexidine 2% Cloths  dextrose 10%.  dextrose 5%.  dextrose 5%.  dextrose 50% Injectable  dextrose 50% Injectable  dextrose 50% Injectable  dextrose Oral Gel PRN  fentaNYL    Injectable PRN  glucagon  Injectable  hydrALAZINE  hydrALAZINE Injectable PRN  insulin lispro (ADMELOG) corrective regimen sliding scale  labetalol Injectable PRN  levothyroxine  metoprolol tartrate  niCARdipine Infusion  pantoprazole   Suspension  piperacillin/tazobactam IVPB..

## 2022-09-07 NOTE — PROGRESS NOTE ADULT - SUBJECTIVE AND OBJECTIVE BOX
CC: Follow up diabetes management    INTERVAL HPI/OVERNIGHT EVENTS:  Remains intubated  Family friend at bedside    ROS: Unable to obtain    MEDICATIONS  (STANDING):  amLODIPine   Tablet 10 milliGRAM(s) Oral daily  atorvastatin 40 milliGRAM(s) Oral at bedtime  chlorhexidine 0.12% Liquid 15 milliLiter(s) Oral Mucosa every 12 hours  chlorhexidine 2% Cloths 1 Application(s) Topical daily  dextrose 5%. 1000 milliLiter(s) (50 mL/Hr) IV Continuous <Continuous>  dextrose 5%. 1000 milliLiter(s) (100 mL/Hr) IV Continuous <Continuous>  dextrose 50% Injectable 25 Gram(s) IV Push once  dextrose 50% Injectable 12.5 Gram(s) IV Push once  dextrose 50% Injectable 25 Gram(s) IV Push once  glucagon  Injectable 1 milliGRAM(s) IntraMuscular once  hydrALAZINE 50 milliGRAM(s) Oral every 8 hours  insulin lispro (ADMELOG) corrective regimen sliding scale   SubCutaneous every 6 hours  levothyroxine 75 MICROGram(s) Enteral Tube daily  metoprolol tartrate 50 milliGRAM(s) Oral two times a day  pantoprazole   Suspension 40 milliGRAM(s) Oral before breakfast  piperacillin/tazobactam IVPB.. 3.375 Gram(s) IV Intermittent every 12 hours    MEDICATIONS  (PRN):  acetaminophen     Tablet .. 650 milliGRAM(s) Oral every 6 hours PRN Temp greater or equal to 38C (100.4F), Mild Pain (1 - 3)  dextrose Oral Gel 15 Gram(s) Oral once PRN Blood Glucose LESS THAN 70 milliGRAM(s)/deciliter  fentaNYL    Injectable 25 MICROGram(s) IV Push every 2 hours PRN Moderate Pain (4 - 6)  hydrALAZINE Injectable 10 milliGRAM(s) IV Push every 2 hours PRN SBP> 140  labetalol Injectable 10 milliGRAM(s) IV Push every 2 hours PRN SBP >140      Allergies  eggs (Anaphylaxis)  No Known Drug Allergies      Vital Signs Last 24 Hrs  T(C): 36.6 (07 Sep 2022 08:15), Max: 36.8 (06 Sep 2022 20:42)  T(F): 97.8 (07 Sep 2022 08:15), Max: 98.2 (06 Sep 2022 20:42)  HR: 58 (07 Sep 2022 10:00) (56 - 70)  BP: 100/54 (07 Sep 2022 10:00) (100/54 - 140/62)  BP(mean): 69 (07 Sep 2022 10:00) (69 - 84)  RR: 16 (07 Sep 2022 10:00) (16 - 18)  SpO2: 97% (07 Sep 2022 10:00) (96% - 100%)    Parameters below as of 07 Sep 2022 08:00  Patient On (Oxygen Delivery Method): ventilator      PHYSICAL EXAM:  General: Intubated  Neck: Supple, trachea midline, no thyromegaly  Respiratory: CTA  Cardiac: +S1, S2, no m/r/g  GI: +BS, soft, non tender, non distended  Extremities: No peripheral edema, no pedal lesions        LABS:                        11.1   8.93  )-----------( 231      ( 07 Sep 2022 03:30 )             35.8     09-07    134<L>  |  95<L>  |  37.3<H>  ----------------------------<  186<H>  5.2   |  25.0  |  3.99<H>    Ca    9.1      07 Sep 2022 03:30  Phos  5.2     09-07  Mg     2.3     09-07        POCT Blood Glucose.: 197 mg/dL (09-07-22 @ 06:11)  POCT Blood Glucose.: 303 mg/dL (09-06-22 @ 23:28)  POCT Blood Glucose.: 164 mg/dL (09-06-22 @ 17:45)  POCT Blood Glucose.: 99 mg/dL (09-06-22 @ 12:05)    Thyroid Stimulating Hormone, Serum: 11.36 uIU/mL (09-05-22 @ 13:00)  Thyroid Stimulating Hormone, Serum: 14.44 uIU/mL (09-05-22 @ 02:41)

## 2022-09-07 NOTE — PROGRESS NOTE ADULT - ASSESSMENT
62y old  Female who presents with a chief complaint of hypertensive crisis/ hyperglycemia ~600/ fever TMax: 100.7 w vomiting, who was declining clinically w lethargy since in ED, CTB was obtained and extensive B/L IVH casting 3rd and 4th vents were noted. pt has been intubated shortly after CT for poor exam/ agonal breaths/ GCS 4.    ESRD  Will arrange dialysis MWF but BP today was 100/52 - d/w team - safer to avoid hypotension  HTN emergency  :  BP now controlled- on Cardene > PO meds - now hydralazine to be held, decrease metoprolol to 25 qd for now and increase as needed  HD later today or am depending on BP  watch K    Large -IVH/ obstructive hydrocephalus, -->  intubated/ Emergent EVD placed  NS noted    Guarded prognosis

## 2022-09-07 NOTE — PROGRESS NOTE ADULT - SUBJECTIVE AND OBJECTIVE BOX
Chief complaint:   Patient is a 62y old  Female who presents with a chief complaint of severe IVH  + ASA 81m PTA (07 Sep 2022 00:39)    HPI:  Patient is a 62y old  Female who presents with a chief complaint of hypertensive crisis/ hyperglycemia ~600/ fever TMax: 100.7 w vomiting, who was declining clinically w lethargy since in ED, CTB was obtained and extensive B/L IVH casting 3rd and 4th vents were noted. pt has been intubated shortly after CT for poor exam/ agonal breaths/ GCS 4.  per d/w son, Feliciano, unable to reach Keshia, daughter/ HCP, full code and all measures to be done, R/B?A of EVD eplained alongside possibility of worsening hemorrhage and he understood and consent obtained via phone.    - LOC   - trauma reported   ?Headache/ unable to obtain from pt/ per ER RN pt did not endorse headache   + Nausea /+ Vomiting  pt was dropped off to ED by granddaughter     Mode: AC/ CMV (Assist Control/ Continuous Mandatory Ventilation)  RR (machine): 16  TV (machine): 0.4  FiO2: 50  PEEP: 5  PIP: 16    Vital Signs Last 24 Hrs  T(C): 38.2 (04 Sep 2022 13:58), Max: 38.2 (04 Sep 2022 13:58)  T(F): 100.7 (04 Sep 2022 13:58), Max: 100.7 (04 Sep 2022 13:58)  HR: 73 (04 Sep 2022 17:36) (70 - 92)  BP: 134/71 (04 Sep 2022 17:36) (119/68 - 200/106)  BP(mean): --  RR: 16 (04 Sep 2022 17:36) (16 - 21)  SpO2: 100% (04 Sep 2022 17:36) (93% - 100%)    Parameters below as of 04 Sep 2022 17:36  Patient On (Oxygen Delivery Method): ventilator (04 Sep 2022 18:00)        24hr EVENTS:      ROS: [ ]  Unable to assess due to mental status   All other systems negative    -----------------------------------------------------------------------------------------------------------------------------------------------------------------------------------  ICU Vital Signs Last 24 Hrs  T(C): 36.6 (07 Sep 2022 08:15), Max: 36.8 (06 Sep 2022 20:42)  T(F): 97.8 (07 Sep 2022 08:15), Max: 98.2 (06 Sep 2022 20:42)  HR: 58 (07 Sep 2022 10:00) (56 - 70)  BP: 100/54 (07 Sep 2022 10:00) (100/54 - 140/62)  BP(mean): 69 (07 Sep 2022 10:00) (69 - 84)  ABP: 130/45 (07 Sep 2022 05:00) (112/42 - 148/55)  ABP(mean): 71 (07 Sep 2022 05:00) (62 - 85)  RR: 16 (07 Sep 2022 10:00) (16 - 18)  SpO2: 97% (07 Sep 2022 10:00) (96% - 100%)    O2 Parameters below as of 07 Sep 2022 08:00  Patient On (Oxygen Delivery Method): ventilator            I&O's Summary    06 Sep 2022 07:01  -  07 Sep 2022 07:00  --------------------------------------------------------  IN: 1697.5 mL / OUT: 505 mL / NET: 1192.5 mL    07 Sep 2022 07:01  -  07 Sep 2022 10:22  --------------------------------------------------------  IN: 145 mL / OUT: 0 mL / NET: 145 mL        MEDICATIONS  (STANDING):  amLODIPine   Tablet 10 milliGRAM(s) Oral daily  atorvastatin 40 milliGRAM(s) Oral at bedtime  chlorhexidine 0.12% Liquid 15 milliLiter(s) Oral Mucosa every 12 hours  chlorhexidine 2% Cloths 1 Application(s) Topical daily  dextrose 5%. 1000 milliLiter(s) (50 mL/Hr) IV Continuous <Continuous>  dextrose 5%. 1000 milliLiter(s) (100 mL/Hr) IV Continuous <Continuous>  dextrose 50% Injectable 25 Gram(s) IV Push once  dextrose 50% Injectable 12.5 Gram(s) IV Push once  dextrose 50% Injectable 25 Gram(s) IV Push once  glucagon  Injectable 1 milliGRAM(s) IntraMuscular once  hydrALAZINE 50 milliGRAM(s) Oral every 8 hours  insulin lispro (ADMELOG) corrective regimen sliding scale   SubCutaneous every 6 hours  levothyroxine 75 MICROGram(s) Enteral Tube daily  metoprolol tartrate 50 milliGRAM(s) Oral two times a day  pantoprazole   Suspension 40 milliGRAM(s) Oral before breakfast  piperacillin/tazobactam IVPB.. 3.375 Gram(s) IV Intermittent every 12 hours      RESPIRATORY:  Mode: AC/ CMV (Assist Control/ Continuous Mandatory Ventilation)  RR (machine): 16  TV (machine): 400  FiO2: 30  PEEP: 5  MAP: 10  PIP: 32        IMAGING:   Recent imaging studies were reviewed.    LAB RESULTS:                          11.1   8.93  )-----------( 231      ( 07 Sep 2022 03:30 )             35.8           09-07    134<L>  |  95<L>  |  37.3<H>  ----------------------------<  186<H>  5.2   |  25.0  |  3.99<H>    Ca    9.1      07 Sep 2022 03:30  Phos  5.2     09-07  Mg     2.3     09-07                  -----------------------------------------------------------------------------------------------------------------------------------------------------------------------------------    PHYSICAL EXAM:  General: Calm, intubated  HEENT: MMM  Neuro:  -Mental status- No acute distress  -CN- PERRL 3mm, EOMI, tongue midline, face symmetric    CV: RRR  Pulm: clear to auscultation  Abd: Soft, nontender, nondistended  Ext: no noted edema in lower ext  Skin: warm, dry       Chief complaint:   Patient is a 62y old  Female who presents with a chief complaint of severe IVH  + ASA 81m PTA (07 Sep 2022 00:39)    HPI:  Patient is a 62y old  Female who presents with a chief complaint of hypertensive crisis/ hyperglycemia ~600/ fever TMax: 100.7 w vomiting, who was declining clinically w lethargy since in ED, CTB was obtained and extensive B/L IVH casting 3rd and 4th vents were noted. pt has been intubated shortly after CT for poor exam/ agonal breaths/ GCS 4.  per d/w son, Feliciano, unable to reach Keshia, daughter/ HCP, full code and all measures to be done, R/B?A of EVD eplained alongside possibility of worsening hemorrhage and he understood and consent obtained via phone.    - LOC   - trauma reported   ?Headache/ unable to obtain from pt/ per ER RN pt did not endorse headache   + Nausea /+ Vomiting  pt was dropped off to ED by granddaughter     Mode: AC/ CMV (Assist Control/ Continuous Mandatory Ventilation)  RR (machine): 16  TV (machine): 0.4  FiO2: 50  PEEP: 5  PIP: 16    Vital Signs Last 24 Hrs  T(C): 38.2 (04 Sep 2022 13:58), Max: 38.2 (04 Sep 2022 13:58)  T(F): 100.7 (04 Sep 2022 13:58), Max: 100.7 (04 Sep 2022 13:58)  HR: 73 (04 Sep 2022 17:36) (70 - 92)  BP: 134/71 (04 Sep 2022 17:36) (119/68 - 200/106)  BP(mean): --  RR: 16 (04 Sep 2022 17:36) (16 - 21)  SpO2: 100% (04 Sep 2022 17:36) (93% - 100%)    Parameters below as of 04 Sep 2022 17:36  Patient On (Oxygen Delivery Method): ventilator (04 Sep 2022 18:00)    24hr EVENTS: poor exam  GOC discussions  no EVD output      ROS: [x ]  Unable to assess due to mental status   All other systems negative    -----------------------------------------------------------------------------------------------------------------------------------------------------------------------------------  ICU Vital Signs Last 24 Hrs  T(C): 36.6 (07 Sep 2022 08:15), Max: 36.8 (06 Sep 2022 20:42)  T(F): 97.8 (07 Sep 2022 08:15), Max: 98.2 (06 Sep 2022 20:42)  HR: 58 (07 Sep 2022 10:00) (56 - 70)  BP: 100/54 (07 Sep 2022 10:00) (100/54 - 140/62)  BP(mean): 69 (07 Sep 2022 10:00) (69 - 84)  ABP: 130/45 (07 Sep 2022 05:00) (112/42 - 148/55)  ABP(mean): 71 (07 Sep 2022 05:00) (62 - 85)  RR: 16 (07 Sep 2022 10:00) (16 - 18)  SpO2: 97% (07 Sep 2022 10:00) (96% - 100%)    O2 Parameters below as of 07 Sep 2022 08:00  Patient On (Oxygen Delivery Method): ventilator            I&O's Summary    06 Sep 2022 07:01  -  07 Sep 2022 07:00  --------------------------------------------------------  IN: 1697.5 mL / OUT: 505 mL / NET: 1192.5 mL    07 Sep 2022 07:01  -  07 Sep 2022 10:22  --------------------------------------------------------  IN: 145 mL / OUT: 0 mL / NET: 145 mL        MEDICATIONS  (STANDING):  amLODIPine   Tablet 10 milliGRAM(s) Oral daily  atorvastatin 40 milliGRAM(s) Oral at bedtime  chlorhexidine 0.12% Liquid 15 milliLiter(s) Oral Mucosa every 12 hours  chlorhexidine 2% Cloths 1 Application(s) Topical daily  dextrose 5%. 1000 milliLiter(s) (50 mL/Hr) IV Continuous <Continuous>  dextrose 5%. 1000 milliLiter(s) (100 mL/Hr) IV Continuous <Continuous>  dextrose 50% Injectable 25 Gram(s) IV Push once  dextrose 50% Injectable 12.5 Gram(s) IV Push once  dextrose 50% Injectable 25 Gram(s) IV Push once  glucagon  Injectable 1 milliGRAM(s) IntraMuscular once  hydrALAZINE 50 milliGRAM(s) Oral every 8 hours  insulin lispro (ADMELOG) corrective regimen sliding scale   SubCutaneous every 6 hours  levothyroxine 75 MICROGram(s) Enteral Tube daily  metoprolol tartrate 50 milliGRAM(s) Oral two times a day  pantoprazole   Suspension 40 milliGRAM(s) Oral before breakfast  piperacillin/tazobactam IVPB.. 3.375 Gram(s) IV Intermittent every 12 hours      RESPIRATORY:  Mode: AC/ CMV (Assist Control/ Continuous Mandatory Ventilation)  RR (machine): 16  TV (machine): 400  FiO2: 30  PEEP: 5  MAP: 10  PIP: 32        IMAGING:   Recent imaging studies were reviewed.    LAB RESULTS:                          11.1   8.93  )-----------( 231      ( 07 Sep 2022 03:30 )             35.8           09-07    134<L>  |  95<L>  |  37.3<H>  ----------------------------<  186<H>  5.2   |  25.0  |  3.99<H>    Ca    9.1      07 Sep 2022 03:30  Phos  5.2     09-07  Mg     2.3     09-07      -----------------------------------------------------------------------------------------------------------------------------------------------------------------------------------    PHYSICAL EXAM:  General: Calm, intubated  HEENT: MMM, tongue edema/contusion  Neuro:  -Mental status- No acute distress  -CN- Pupils 4mm NR, no VOR, tongue midline, face symmetric  +gag/weak cough  flaccid in all ext    CV: RRR  Pulm: clear to auscultation  Abd: Soft, nontender, nondistended  Ext: no noted edema in lower ext  Skin: warm, dry

## 2022-09-07 NOTE — PROGRESS NOTE ADULT - ASSESSMENT
61 yo F with multiple co-morbidities, presented with large IVH/ obstructive hydrocephalus, concern for L BG ICH, score 3. S/p emergent EVD placement.  Nonocclusive thrombus in the proximal right basilar artery. Occluded left vertebral artery distal V4 segment.  H/o CAD on ASA; Afib not on AC due to GIB. Mixed CHF.  Acute resp failure. PNA.  Cirrhosis with moderate ascites. S/p paracentesis 9/5.  ESRD on HD.  Hyperglycemia, DM.    Plan  cont neurochecks, off sedation, PRN fentanyl 25 for agitation/vent dyssynchrony  maintain EVD @0, monitor outp and ICP  vent support, adjust as needed, SBT as tolerated  HD on Wed, Renal involvement appreciated  change TF in view of hypoglycemia events, BM regimen, PPI for ppx  SCDs, no AC in view of brain bleed  SSI for now, monitor FS  cont Zosyn for 7 days, follow Cx, ascitic fluid   GOC discussion with the family members today  Full code for now           63 yo F with multiple co-morbidities, presented with large IVH/ obstructive hydrocephalus, concern for L BG ICH, score 3. S/p emergent EVD placement.  Nonocclusive thrombus in the proximal right basilar artery. Occluded left vertebral artery distal V4 segment.  H/o CAD on ASA; Afib not on AC due to GIB. Mixed CHF.  Acute resp failure. PNA.  Cirrhosis with moderate ascites. S/p paracentesis 9/5.  ESRD on HD.  Hyperglycemia, DM.    Plan  cont q4h neurochecks, off sedation, PRN fentanyl 25 for agitation/vent   maintain EVD @0, monitor outp and ICP  vent support, adjust as needed, SBT as tolerated  hold hydralazine and norvasc for HD today  dec metoprolol 25mg   HD on Wed, Renal involvement appreciated  anuric  changed TF in view of hypoglycemia events, BM regimen, PPI for ppx  rectal tube- liquid stools  SCDs, no AC in view of brain bleed  SSI for now, monitor FS  cont Zosyn for 7 days, follow Cx, ascitic fluid   GOC discussion with the family members    Palliative consult  Full code for now           61 yo F with multiple co-morbidities, presented with large IVH/ obstructive hydrocephalus, concern for L BG ICH, score 3. S/p emergent EVD placement.  Nonocclusive thrombus in the proximal right basilar artery. Occluded left vertebral artery distal V4 segment.  H/o CAD on ASA; Afib not on AC due to GIB. Mixed CHF.  Acute resp failure. PNA.  Cirrhosis with moderate ascites. S/p paracentesis 9/5.  ESRD on HD.  Hyperglycemia, DM.    NEURO:   cont q4h neurochecks, off sedation, PRN fentanyl 25 for agitation/vent   maintain EVD @0, monitor outp and ICP  pain mgt: tylenol and oxy 5 prn  Palliative consult- GOC discussion with the family members      Activity: [x] mobilize as tolerated [] Bedrest [x] PT [x] OT [] PMNR    PULM: vent support, adjust as needed, SBT as tolerated  SpO2>92%  OOB    CV:  hold hydralazine and norvasc for HD today  dec metoprolol 25mg   -160  statin    RENAL: monitor I/O  HD on Wed, Renal involvement appreciated  anuric    GI:  changed TF in view of hypoglycemia events, BM regimen, PPI for ppx  rectal tube- liquid stools  PPI  Bowel regimen [x] senna [] other:    ENDO:   Goal euglycemia (-180)  SSI for now, monitor FS  synthroid    HEME/ONC:  VTE prophylaxis: [] SCDs [x] chemoprophylaxis held 24hrs post-bleed   SCDs, no AC in view of brain bleed    ID: afebrile   no leukocytosis  cont Zosyn for 7 days, follow Cx, ascitic fluid     MISC:    I affirm that this patient is critically ill and at high risk for sudden, fatal deterioration due to one or more of the above stated active issues.     This time does not include bedside procedures that are documented separately.

## 2022-09-07 NOTE — PROGRESS NOTE ADULT - SUBJECTIVE AND OBJECTIVE BOX
CC:  Follow up GOC , Symptoms    OVERNIGHT EVENTS:      Present Symptoms:   Dyspnea:  No  Mild  Mod   Severe     Nausea/Vomiting:  No  Yes  Anxiety:  No   Yes    Depression:  No  Yes  Unable  Fatigue:  Yes   No   Unable  Loss of appetite:  No Yes   NA   Constipation: Not Reported   Yes    Pain: No   No Signs            Character-            Duration-            Location-            Severity-    Pain AD Score:  [email or web address] (press ctrl + left click to view)    Review of Systems: Reviewed as above  Further ROS unable to  obtain due to poor mentation   All others negative    MEDICATIONS  (STANDING):  atorvastatin 40 milliGRAM(s) Oral at bedtime  chlorhexidine 0.12% Liquid 15 milliLiter(s) Oral Mucosa every 12 hours  chlorhexidine 2% Cloths 1 Application(s) Topical daily  dextrose 5%. 1000 milliLiter(s) (50 mL/Hr) IV Continuous <Continuous>  dextrose 5%. 1000 milliLiter(s) (100 mL/Hr) IV Continuous <Continuous>  dextrose 50% Injectable 25 Gram(s) IV Push once  dextrose 50% Injectable 12.5 Gram(s) IV Push once  dextrose 50% Injectable 25 Gram(s) IV Push once  glucagon  Injectable 1 milliGRAM(s) IntraMuscular once  insulin glargine Injectable (LANTUS) 8 Unit(s) SubCutaneous at bedtime  insulin lispro (ADMELOG) corrective regimen sliding scale   SubCutaneous every 6 hours  levothyroxine 75 MICROGram(s) Enteral Tube daily  metoprolol tartrate 25 milliGRAM(s) Oral two times a day  pantoprazole   Suspension 40 milliGRAM(s) Oral before breakfast  piperacillin/tazobactam IVPB.. 3.375 Gram(s) IV Intermittent every 12 hours    MEDICATIONS  (PRN):  acetaminophen     Tablet .. 650 milliGRAM(s) Oral every 6 hours PRN Temp greater or equal to 38C (100.4F), Mild Pain (1 - 3)  dextrose Oral Gel 15 Gram(s) Oral once PRN Blood Glucose LESS THAN 70 milliGRAM(s)/deciliter  fentaNYL    Injectable 25 MICROGram(s) IV Push every 2 hours PRN Moderate Pain (4 - 6)  ondansetron Injectable 4 milliGRAM(s) IV Push every 6 hours PRN Nausea and/or Vomiting      PHYSICAL EXAM:    Vital Signs Last 24 Hrs  T(C): 36.4 (07 Sep 2022 11:25), Max: 36.8 (06 Sep 2022 20:42)  T(F): 97.6 (07 Sep 2022 11:25), Max: 98.2 (06 Sep 2022 20:42)  HR: 61 (07 Sep 2022 12:41) (56 - 70)  BP: 100/51 (07 Sep 2022 12:00) (100/51 - 140/62)  BP(mean): 68 (07 Sep 2022 12:00) (68 - 84)  RR: 16 (07 Sep 2022 12:00) (16 - 17)  SpO2: 100% (07 Sep 2022 12:41) (96% - 100%)    Parameters below as of 07 Sep 2022 12:00  Patient On (Oxygen Delivery Method): ventilator        Karnofsky:  %  General:         HEENT:  NCAT              (  )  ET tube   (  )  NGT  Lungs: comfortable  non labored  CV:    GI:             (  ) PEG  :               MSK:   Skin:  warm/dry  Neuro      LABS:                          11.1   8.93  )-----------( 231      ( 07 Sep 2022 03:30 )             35.8     09-07    134<L>  |  95<L>  |  37.3<H>  ----------------------------<  186<H>  5.2   |  25.0  |  3.99<H>    Ca    9.1      07 Sep 2022 03:30  Phos  5.2     09-07  Mg     2.3     09-07          I&O's Summary    06 Sep 2022 07:01  -  07 Sep 2022 07:00  --------------------------------------------------------  IN: 1697.5 mL / OUT: 505 mL / NET: 1192.5 mL    07 Sep 2022 07:01  -  07 Sep 2022 13:44  --------------------------------------------------------  IN: 265 mL / OUT: 2 mL / NET: 263 mL        RADIOLOGY & ADDITIONAL STUDIES:    < from: CT Head No Cont (09.05.22 @ 04:44) >    IMPRESSION:  A right frontal approach external ventricular drain terminates in third   ventricle.  Mild postprocedural pneumocephalus.    Severe intraventricular hemorrhage with casting the ventricular system   and moderate hydrocephalus is stable.    New mild subarachnoid hemorrhage within the cerebral hemispheres and   sylvian fissures bilaterally probably reflects recirculation of   intraventricular blood products, rather than new hemorrhage.    There is suspicion for underlying left thalamic parenchymal hemorrhage,   which is difficult to distinguish from the intraventricular hemorrhage in   the adjacent third ventricle. Follow-up MRI may be obtained for further     < end of copied text >      ADVANCE DIRECTIVES/TREATMENT PREFERENCES:   CC:  Follow up GOC , Symptoms    OVERNIGHT EVENTS:  remains on vent    Present Symptoms:   Dyspnea:  No - on vent  Nausea/Vomiting:  No    Anxiety:  No    Depression:   Unable  Fatigue:  Yes    Loss of appetite:  NA - NPO  Constipation: Not Reported   not reported    Pain:   No Signs            Character-            Duration-            Location-            Severity-    Pain AD Score:  [email or web address] (press ctrl + left click to view)    Review of Systems: Reviewed as above  Further ROS unable to  obtain due to poor mentation       MEDICATIONS  (STANDING):  atorvastatin 40 milliGRAM(s) Oral at bedtime  chlorhexidine 0.12% Liquid 15 milliLiter(s) Oral Mucosa every 12 hours  chlorhexidine 2% Cloths 1 Application(s) Topical daily  dextrose 5%. 1000 milliLiter(s) (50 mL/Hr) IV Continuous <Continuous>  dextrose 5%. 1000 milliLiter(s) (100 mL/Hr) IV Continuous <Continuous>  dextrose 50% Injectable 25 Gram(s) IV Push once  dextrose 50% Injectable 12.5 Gram(s) IV Push once  dextrose 50% Injectable 25 Gram(s) IV Push once  glucagon  Injectable 1 milliGRAM(s) IntraMuscular once  insulin glargine Injectable (LANTUS) 8 Unit(s) SubCutaneous at bedtime  insulin lispro (ADMELOG) corrective regimen sliding scale   SubCutaneous every 6 hours  levothyroxine 75 MICROGram(s) Enteral Tube daily  metoprolol tartrate 25 milliGRAM(s) Oral two times a day  pantoprazole   Suspension 40 milliGRAM(s) Oral before breakfast  piperacillin/tazobactam IVPB.. 3.375 Gram(s) IV Intermittent every 12 hours    MEDICATIONS  (PRN):  acetaminophen     Tablet .. 650 milliGRAM(s) Oral every 6 hours PRN Temp greater or equal to 38C (100.4F), Mild Pain (1 - 3)  dextrose Oral Gel 15 Gram(s) Oral once PRN Blood Glucose LESS THAN 70 milliGRAM(s)/deciliter  fentaNYL    Injectable 25 MICROGram(s) IV Push every 2 hours PRN Moderate Pain (4 - 6)  ondansetron Injectable 4 milliGRAM(s) IV Push every 6 hours PRN Nausea and/or Vomiting      PHYSICAL EXAM:    Vital Signs Last 24 Hrs  T(C): 36.4 (07 Sep 2022 11:25), Max: 36.8 (06 Sep 2022 20:42)  T(F): 97.6 (07 Sep 2022 11:25), Max: 98.2 (06 Sep 2022 20:42)  HR: 61 (07 Sep 2022 12:41) (56 - 70)  BP: 100/51 (07 Sep 2022 12:00) (100/51 - 140/62)  BP(mean): 68 (07 Sep 2022 12:00) (68 - 84)  RR: 16 (07 Sep 2022 12:00) (16 - 17)  SpO2: 100% (07 Sep 2022 12:41) (96% - 100%)    Parameters below as of 07 Sep 2022 12:00  Patient On (Oxygen Delivery Method): ventilator        Karnofsky: 10  %  General: F intubated NAD        HEENT:  NCAT  OGT  Lungs: comfortable  non labored  CV:  RR  GI:  soft NTND                    MSK: bedbound  Skin:  warm/dry  Neuro  non responsive    LABS:                          11.1   8.93  )-----------( 231      ( 07 Sep 2022 03:30 )             35.8     09-07    134<L>  |  95<L>  |  37.3<H>  ----------------------------<  186<H>  5.2   |  25.0  |  3.99<H>    Ca    9.1      07 Sep 2022 03:30  Phos  5.2     09-07  Mg     2.3     09-07          I&O's Summary    06 Sep 2022 07:01  -  07 Sep 2022 07:00  --------------------------------------------------------  IN: 1697.5 mL / OUT: 505 mL / NET: 1192.5 mL    07 Sep 2022 07:01  -  07 Sep 2022 13:44  --------------------------------------------------------  IN: 265 mL / OUT: 2 mL / NET: 263 mL        RADIOLOGY & ADDITIONAL STUDIES:    < from: CT Head No Cont (09.05.22 @ 04:44) >    IMPRESSION:  A right frontal approach external ventricular drain terminates in third   ventricle.  Mild postprocedural pneumocephalus.    Severe intraventricular hemorrhage with casting the ventricular system   and moderate hydrocephalus is stable.    New mild subarachnoid hemorrhage within the cerebral hemispheres and   sylvian fissures bilaterally probably reflects recirculation of   intraventricular blood products, rather than new hemorrhage.    There is suspicion for underlying left thalamic parenchymal hemorrhage,   which is difficult to distinguish from the intraventricular hemorrhage in   the adjacent third ventricle. Follow-up MRI may be obtained for further     < end of copied text >      ADVANCE DIRECTIVES/TREATMENT PREFERENCES:

## 2022-09-07 NOTE — PROGRESS NOTE ADULT - SUBJECTIVE AND OBJECTIVE BOX
Patient seen and examined    I&O's Summary    06 Sep 2022 07:01  -  07 Sep 2022 07:00  --------------------------------------------------------  IN: 1697.5 mL / OUT: 505 mL / NET: 1192.5 mL    07 Sep 2022 07:01  -  07 Sep 2022 14:05  --------------------------------------------------------  IN: 265 mL / OUT: 2 mL / NET: 263 mL      Remains vented  Unresponsive  s/p EVD placement  off all IV pressors    REVIEW OF SYSTEMS:  NA    Vital Signs Last 24 Hrs  T(C): 36.4 (07 Sep 2022 11:25), Max: 36.8 (06 Sep 2022 20:42)  T(F): 97.6 (07 Sep 2022 11:25), Max: 98.2 (06 Sep 2022 20:42)  HR: 61 (07 Sep 2022 13:00) (56 - 70)  BP: 108/57 (07 Sep 2022 13:00) (100/51 - 140/62)  BP(mean): 74 (07 Sep 2022 13:00) (68 - 84)  RR: 17 (07 Sep 2022 13:00) (16 - 17)  SpO2: 97% (07 Sep 2022 13:00) (96% - 100%)    Parameters below as of 07 Sep 2022 13:00  Patient On (Oxygen Delivery Method): ventilator    O2 Concentration (%): 40    PHYSICAL EXAM:    GENERAL: NAD, unresponsive  EYES:  conjunctiva and sclera clear  NECK: Supple, No JVD/Bruit  NERVOUS SYSTEM:  No response ,   CHEST:  No rales or rhonchi  HEART:  RRR, No murmur  ABDOMEN: Soft, NT/ND BS+  EXTREMITIES:  No Edema;  SKIN: No rashes    LABS:                        11.1   8.93  )-----------( 231      ( 07 Sep 2022 03:30 )             35.8                           10.9   7.42  )-----------( 221      ( 06 Sep 2022 03:45 )             35.3     09-06    135  |  96<L>  |  20.0  ----------------------------<  67<L>  4.8   |  28.0  |  3.18<H>    Ca    9.1      06 Sep 2022 03:45  Phos  3.9     09-06  Mg     2.0     09-06    TPro  9.2<H>  /  Alb  3.4  /  TBili  0.9  /  DBili  x   /  AST  42<H>  /  ALT  30  /  AlkPhos  591<H>  09-04 09-07    134<L>  |  95<L>  |  37.3<H>  ----------------------------<  186<H>  5.2   |  25.0  |  3.99<H>    Ca    9.1      07 Sep 2022 03:30  Phos  5.2     09-07  Mg     2.3     09-07    MEDICATIONS  (STANDING):  atorvastatin 40 milliGRAM(s) Oral at bedtime  chlorhexidine 0.12% Liquid 15 milliLiter(s) Oral Mucosa every 12 hours  chlorhexidine 2% Cloths 1 Application(s) Topical daily  dextrose 5%. 1000 milliLiter(s) (50 mL/Hr) IV Continuous <Continuous>  dextrose 5%. 1000 milliLiter(s) (100 mL/Hr) IV Continuous <Continuous>  dextrose 50% Injectable 25 Gram(s) IV Push once  dextrose 50% Injectable 12.5 Gram(s) IV Push once  dextrose 50% Injectable 25 Gram(s) IV Push once  glucagon  Injectable 1 milliGRAM(s) IntraMuscular once  insulin glargine Injectable (LANTUS) 8 Unit(s) SubCutaneous at bedtime  insulin lispro (ADMELOG) corrective regimen sliding scale   SubCutaneous every 6 hours  levothyroxine 75 MICROGram(s) Enteral Tube daily  metoprolol tartrate 25 milliGRAM(s) Oral two times a day  pantoprazole   Suspension 40 milliGRAM(s) Oral before breakfast  piperacillin/tazobactam IVPB.. 3.375 Gram(s) IV Intermittent every 12 hours

## 2022-09-07 NOTE — PROGRESS NOTE ADULT - ASSESSMENT
61yo F with extensive medical problems, p/w hemorrhagic stroke, IVH/hydrocephalus brain compression requiring EVD placement. On admission a1c was 10.6%, endo consulted for same.     1. T2DM, a1c 10.6%  - Episodes of hyperglycemia  - Start Lantus 8 units daily  - Continue SSI    2. Hypothyroidism - TSH 14  - LT4 75 mcg PO daily  - Recheck levels in 4-6 weeks    3.IVH/ obstructive hydrocephalus  - EVD in place  - Care per neuro ICU 63yo F with extensive medical problems, p/w hemorrhagic stroke, IVH/hydrocephalus brain compression requiring EVD placement. On admission a1c was 10.6%, endo consulted for same.  - Of note, family friend was at bedside who was patients caretaker. She spoke with daughter and confirmed her home diabetic regimen consisted of humalog 10 units BID, depending on blood sugar level.    1. T2DM, a1c 10.6%  - Episodes of hyperglycemia  - Start Lantus 8 units daily  - Continue SSI    2. Hypothyroidism - TSH 14  - LT4 75 mcg PO daily  - Recheck levels in 4-6 weeks    3.IVH/ obstructive hydrocephalus  - EVD in place  - Care per neuro ICU

## 2022-09-07 NOTE — PROGRESS NOTE ADULT - ASSESSMENT
Assessment:  62F with extensive PMH who presented with HA, N/V, required intubation, found to have IVH, ICH score 3. EVD placed 9/4 for hydrocephalus.   - PBD 3  - Exam worsening, no longer has corneal reflex  - GOC discussions continue      Plan:   - Q4 hour coma checks  - SBP goal   - DVT prophylaxis: SCDs only, no lovenox 2/2 acute bleed  - EVD @ 0, minimal drainage likely 2/2 clot within catheter, ICPs WNL, notify provider if ICP >22 sustained or drainage >20 cc/hour  - Zosyn for PNA per ICU team   - HD M/W/F  - Palliative following   - Further care per NSICU team  - Final plan pending morning rounds with attending

## 2022-09-07 NOTE — PROGRESS NOTE ADULT - SUBJECTIVE AND OBJECTIVE BOX
Patient is a 62y old  Female who presents with a chief complaint of severe IVH  + ASA 81m PTA (06 Sep 2022 16:59)    HPI:  Patient is a 62y old  Female who presents with a chief complaint of hypertensive crisis/ hyperglycemia ~600/ fever TMax: 100.7 w vomiting, who was declining clinically w lethargy since in ED, CTB was obtained and extensive B/L IVH casting 3rd and 4th vents were noted. pt has been intubated shortly after CT for poor exam/ agonal breaths/ GCS 4.  per d/w son, Feliciano, unable to reach Keshia, daughter/ HCP, full code and all measures to be done, R/B?A of EVD eplained alongside possibility of worsening hemorrhage and he understood and consent obtained via phone.    - LOC   - trauma reported   ?Headache/ unable to obtain from pt/ per ER RN pt did not endorse headache   + Nausea /+ Vomiting  pt was dropped off to ED by granddaughter (04 Sep 2022 18:00)      Interval history:  Patient seen and examined by neurosurgery team. Now Post-bleed day 3. EVD with minimal output. Exam continues to worsen. Goals of care discussions with family, neurosurgery, and NSICU teams ongoing.       Vital Signs Last 24 Hrs  T(C): 36.7 (06 Sep 2022 21:00), Max: 37.1 (06 Sep 2022 01:00)  T(F): 98 (06 Sep 2022 21:00), Max: 98.8 (06 Sep 2022 01:00)  HR: 63 (07 Sep 2022 00:05) (54 - 70)  BP: 104/53 (07 Sep 2022 00:05) (104/53 - 158/73)  BP(mean): 70 (07 Sep 2022 00:05) (70 - 98)  RR: 16 (07 Sep 2022 00:05) (16 - 18)  SpO2: 99% (07 Sep 2022 00:05) (96% - 100%)    Parameters below as of 07 Sep 2022 00:00  Patient On (Oxygen Delivery Method): ventilator    O2 Concentration (%): 30      Physical Exam:  Constitutional: intubated   Neuro  * Mental Status: no EO, not following commands, no movement of extremities to noxious stimuli, intubated  * Cranial Nerves: pupiles 4mm non reactive, + weak cough, - corneals, - dolls eye  * Motor: no movement of extremities to noxious stimuli   * Sensory: no movement 2/2 noxious stimuli   * Reflexes: not assessed   Head: EVD in place, dressing C/D/I      LABS:                        10.9   7.42  )-----------( 221      ( 06 Sep 2022 03:45 )             35.3     09-06    135  |  96<L>  |  20.0  ----------------------------<  67<L>  4.8   |  28.0  |  3.18<H>    Ca    9.1      06 Sep 2022 03:45  Phos  3.9     09-06  Mg     2.0     09-06    CULTURES:  Culture Results:   No growth to date. (09-05 @ 18:30)  Culture Results:   No growth (09-05 @ 16:30)      Medications:  MEDICATIONS  (STANDING):  amLODIPine   Tablet 10 milliGRAM(s) Oral daily  atorvastatin 40 milliGRAM(s) Oral at bedtime  chlorhexidine 0.12% Liquid 15 milliLiter(s) Oral Mucosa every 12 hours  chlorhexidine 2% Cloths 1 Application(s) Topical daily  dextrose 5%. 1000 milliLiter(s) (50 mL/Hr) IV Continuous <Continuous>  dextrose 5%. 1000 milliLiter(s) (100 mL/Hr) IV Continuous <Continuous>  dextrose 50% Injectable 25 Gram(s) IV Push once  dextrose 50% Injectable 12.5 Gram(s) IV Push once  dextrose 50% Injectable 25 Gram(s) IV Push once  glucagon  Injectable 1 milliGRAM(s) IntraMuscular once  hydrALAZINE 50 milliGRAM(s) Oral every 8 hours  insulin lispro (ADMELOG) corrective regimen sliding scale   SubCutaneous every 6 hours  levothyroxine 75 MICROGram(s) Enteral Tube daily  metoprolol tartrate 50 milliGRAM(s) Oral two times a day  niCARdipine Infusion 5 mG/Hr (25 mL/Hr) IV Continuous <Continuous>  pantoprazole   Suspension 40 milliGRAM(s) Oral before breakfast  piperacillin/tazobactam IVPB.. 3.375 Gram(s) IV Intermittent every 12 hours    MEDICATIONS  (PRN):  acetaminophen     Tablet .. 650 milliGRAM(s) Oral every 6 hours PRN Temp greater or equal to 38C (100.4F), Mild Pain (1 - 3)  dextrose Oral Gel 15 Gram(s) Oral once PRN Blood Glucose LESS THAN 70 milliGRAM(s)/deciliter  fentaNYL    Injectable 25 MICROGram(s) IV Push every 2 hours PRN Moderate Pain (4 - 6)  hydrALAZINE Injectable 10 milliGRAM(s) IV Push every 2 hours PRN SBP> 140  labetalol Injectable 10 milliGRAM(s) IV Push every 2 hours PRN SBP >140      RADIOLOGY & ADDITIONAL STUDIES:  < from: CT Head No Cont (09.05.22 @ 04:44) >  IMPRESSION:  A right frontal approach external ventricular drain terminates in third   ventricle.  Mild postprocedural pneumocephalus.    Severe intraventricular hemorrhage with casting the ventricular system   and moderate hydrocephalus is stable.    New mild subarachnoid hemorrhage within the cerebral hemispheres and   sylvian fissures bilaterally probably reflects recirculation of   intraventricular blood products, rather than new hemorrhage.    There is suspicion for underlying left thalamic parenchymal hemorrhage,   which is difficult to distinguish from the intraventricular hemorrhage in   the adjacent third ventricle. Follow-up MRI may be obtained for further   evaluation.    --- End of Report ---    MCKINLEY BENITEZ MD; Attending Radiologist  This document has been electronically signed. Sep  5 2022  7:43AM    < end of copied text >

## 2022-09-07 NOTE — PROGRESS NOTE ADULT - ASSESSMENT
62yr old  woman with Past MHX   ESRD on HD, OP, CHF, DM, PAF  hypertension, hyperlipidemia. anemia,  cirrhosis admitted with IVH with poor neurological exam    IVH  poor neurological status  neurochecks  plan per NSICU    Respiratory Failure  on vent - wean per NSICU    Cirrhosis/Ascites  on IV Zosyn - NGTD  follow up final cultures    ESRD  HD per renal    Encounter for Palliative Care  Spoke to daughter Keshia. See [facility] for details.   -Keshia states she is the HCP, but also needs father's  ( patient's ) input before making any decisions.  -She was informed of overall poor outlook for neurological recovery.    -Advance directives/DNR discussed-  Keshia will speak to her father and rest of the family  -Offered family meeting with her and her father so he may be apprised of  his wife's condition by the medical team.  Psychosocial support

## 2022-09-07 NOTE — CHART NOTE - NSCHARTNOTEFT_GEN_A_CORE
Palliative care social work note.    SW was present during telephone discussions with palliative care physician DR Norris and daughter Keshia with . Overall prognosis and medica issues reviewed with daughter and support offered. Palliative team attempting to prepare daughter and family regarding poor prognosis and address decisions to be made further as well as limited meaningful recovery. Daughter verbalized understanding severity of condition but that she needs to discuss all information with her family and patients  before making any decisions. palliative care to follow.

## 2022-09-08 NOTE — PROGRESS NOTE ADULT - SUBJECTIVE AND OBJECTIVE BOX
CC: Follow up diabetes management     INTERVAL HPI/OVERNIGHT EVENTS:  Intubated, receiving dialysis  Lantus restarted last night    ROS: Unable to obtain    MEDICATIONS  (STANDING):  atorvastatin 40 milliGRAM(s) Oral at bedtime  chlorhexidine 0.12% Liquid 15 milliLiter(s) Oral Mucosa every 12 hours  chlorhexidine 2% Cloths 1 Application(s) Topical daily  dextrose 5%. 1000 milliLiter(s) (50 mL/Hr) IV Continuous <Continuous>  dextrose 5%. 1000 milliLiter(s) (100 mL/Hr) IV Continuous <Continuous>  dextrose 50% Injectable 25 Gram(s) IV Push once  dextrose 50% Injectable 12.5 Gram(s) IV Push once  dextrose 50% Injectable 25 Gram(s) IV Push once  glucagon  Injectable 1 milliGRAM(s) IntraMuscular once  insulin glargine Injectable (LANTUS) 8 Unit(s) SubCutaneous at bedtime  insulin lispro (ADMELOG) corrective regimen sliding scale   SubCutaneous every 6 hours  levothyroxine 75 MICROGram(s) Enteral Tube daily  metoprolol tartrate 25 milliGRAM(s) Oral two times a day  pantoprazole   Suspension 40 milliGRAM(s) Oral before breakfast  piperacillin/tazobactam IVPB.. 3.375 Gram(s) IV Intermittent every 12 hours    MEDICATIONS  (PRN):  acetaminophen     Tablet .. 650 milliGRAM(s) Oral every 6 hours PRN Temp greater or equal to 38C (100.4F), Mild Pain (1 - 3)  dextrose Oral Gel 15 Gram(s) Oral once PRN Blood Glucose LESS THAN 70 milliGRAM(s)/deciliter  fentaNYL    Injectable 25 MICROGram(s) IV Push every 2 hours PRN Moderate Pain (4 - 6)  ondansetron Injectable 4 milliGRAM(s) IV Push every 6 hours PRN Nausea and/or Vomiting    Allergies  eggs (Anaphylaxis)  No Known Drug Allergies    Vital Signs Last 24 Hrs  T(C): 36.2 (08 Sep 2022 06:00), Max: 36.6 (08 Sep 2022 00:03)  T(F): 97.2 (08 Sep 2022 06:00), Max: 97.8 (08 Sep 2022 00:03)  HR: 61 (08 Sep 2022 09:00) (56 - 63)  BP: 113/63 (08 Sep 2022 09:00) (95/51 - 129/59)  BP(mean): 79 (08 Sep 2022 09:00) (65 - 82)  RR: 16 (08 Sep 2022 09:00) (16 - 23)  SpO2: 98% (08 Sep 2022 09:00) (96% - 100%)    Parameters below as of 08 Sep 2022 08:00  Patient On (Oxygen Delivery Method): ventilator    O2 Concentration (%): 30    PHYSICAL EXAM:  General: Intubated  Neck: Supple, trachea midline, no thyromegaly  Respiratory: Course bs  Cardiac: +S1, S2, no m/r/g  GI: +BS, soft, non tender, non distended  Extremities: No peripheral edema, no pedal lesions  Skin: Warm, dry        LABS:                        11.9   10.62 )-----------( 242      ( 08 Sep 2022 03:10 )             38.2     09-08    134<L>  |  93<L>  |  48.8<H>  ----------------------------<  218<H>  4.8   |  25.0  |  4.74<H>    Ca    9.3      08 Sep 2022 03:10  Phos  5.6     09-08  Mg     2.5     09-08    TPro  7.3  /  Alb  2.4<L>  /  TBili  0.7  /  DBili  x   /  AST  21  /  ALT  7   /  AlkPhos  441<H>  09-08      POCT Blood Glucose.: 146 mg/dL (09-08-22 @ 05:32)  POCT Blood Glucose.: 296 mg/dL (09-08-22 @ 00:27)  POCT Blood Glucose.: 273 mg/dL (09-07-22 @ 23:18)  POCT Blood Glucose.: 218 mg/dL (09-07-22 @ 17:21)  POCT Blood Glucose.: 221 mg/dL (09-07-22 @ 11:44)      Thyroid Stimulating Hormone, Serum: 11.36 uIU/mL (09-05-22 @ 13:00)  Thyroid Stimulating Hormone, Serum: 14.44 uIU/mL (09-05-22 @ 02:41)

## 2022-09-08 NOTE — GOALS OF CARE CONVERSATION - ADVANCED CARE PLANNING - CONVERSATION DETAILS
I spoke extensively with Keshia via  and explained that Elidia does not have any key brainstem reflexes. The only barrier to brain death at this time is overbreathing occasionally. Keshia voiced that she understands how serious the brain injury is and that they know she can't recover. They are waiting for Elidia to be ready to go.    They decided as a family that Elidia should be DNR if her heart stops.

## 2022-09-08 NOTE — PROGRESS NOTE ADULT - SUBJECTIVE AND OBJECTIVE BOX
Chief complaint:   Patient is a 62y old  Female who presents with a chief complaint of severe IVH  + ASA 81m PTA (08 Sep 2022 07:45)    HPI:  Patient is a 62y old  Female who presents with a chief complaint of hypertensive crisis/ hyperglycemia ~600/ fever TMax: 100.7 w vomiting, who was declining clinically w lethargy since in ED, CTB was obtained and extensive B/L IVH casting 3rd and 4th vents were noted. pt has been intubated shortly after CT for poor exam/ agonal breaths/ GCS 4.  per d/w son, Feliciano, unable to reach Keshia, daughter/ HCP, full code and all measures to be done, R/B?A of EVD eplained alongside possibility of worsening hemorrhage and he understood and consent obtained via phone.    - LOC   - trauma reported   ?Headache/ unable to obtain from pt/ per ER RN pt did not endorse headache   + Nausea /+ Vomiting  pt was dropped off to ED by granddaughter     Mode: AC/ CMV (Assist Control/ Continuous Mandatory Ventilation)  RR (machine): 16  TV (machine): 0.4  FiO2: 50  PEEP: 5  PIP: 16    Vital Signs Last 24 Hrs  T(C): 38.2 (04 Sep 2022 13:58), Max: 38.2 (04 Sep 2022 13:58)  T(F): 100.7 (04 Sep 2022 13:58), Max: 100.7 (04 Sep 2022 13:58)  HR: 73 (04 Sep 2022 17:36) (70 - 92)  BP: 134/71 (04 Sep 2022 17:36) (119/68 - 200/106)  BP(mean): --  RR: 16 (04 Sep 2022 17:36) (16 - 21)  SpO2: 100% (04 Sep 2022 17:36) (93% - 100%)    Parameters below as of 04 Sep 2022 17:36  Patient On (Oxygen Delivery Method): ventilator (04 Sep 2022 18:00)        24hr EVENTS:      ROS: [ ]  Unable to assess due to mental status   All other systems negative    -----------------------------------------------------------------------------------------------------------------------------------------------------------------------------------  ICU Vital Signs Last 24 Hrs  T(C): 36.2 (08 Sep 2022 06:00), Max: 36.6 (08 Sep 2022 00:03)  T(F): 97.2 (08 Sep 2022 06:00), Max: 97.8 (08 Sep 2022 00:03)  HR: 61 (08 Sep 2022 09:00) (56 - 63)  BP: 113/63 (08 Sep 2022 09:00) (95/51 - 129/59)  BP(mean): 79 (08 Sep 2022 09:00) (65 - 82)  ABP: --  ABP(mean): --  RR: 16 (08 Sep 2022 09:00) (16 - 23)  SpO2: 98% (08 Sep 2022 09:00) (96% - 100%)    O2 Parameters below as of 08 Sep 2022 08:00  Patient On (Oxygen Delivery Method): ventilator    O2 Concentration (%): 30        I&O's Summary    07 Sep 2022 07:01  -  08 Sep 2022 07:00  --------------------------------------------------------  IN: 1210 mL / OUT: 109 mL / NET: 1101 mL    08 Sep 2022 07:01  -  08 Sep 2022 09:38  --------------------------------------------------------  IN: 145 mL / OUT: 1 mL / NET: 144 mL        MEDICATIONS  (STANDING):  atorvastatin 40 milliGRAM(s) Oral at bedtime  chlorhexidine 0.12% Liquid 15 milliLiter(s) Oral Mucosa every 12 hours  chlorhexidine 2% Cloths 1 Application(s) Topical daily  dextrose 5%. 1000 milliLiter(s) (50 mL/Hr) IV Continuous <Continuous>  dextrose 5%. 1000 milliLiter(s) (100 mL/Hr) IV Continuous <Continuous>  dextrose 50% Injectable 25 Gram(s) IV Push once  dextrose 50% Injectable 12.5 Gram(s) IV Push once  dextrose 50% Injectable 25 Gram(s) IV Push once  glucagon  Injectable 1 milliGRAM(s) IntraMuscular once  insulin glargine Injectable (LANTUS) 8 Unit(s) SubCutaneous at bedtime  insulin lispro (ADMELOG) corrective regimen sliding scale   SubCutaneous every 6 hours  levothyroxine 75 MICROGram(s) Enteral Tube daily  metoprolol tartrate 25 milliGRAM(s) Oral two times a day  pantoprazole   Suspension 40 milliGRAM(s) Oral before breakfast  piperacillin/tazobactam IVPB.. 3.375 Gram(s) IV Intermittent every 12 hours      RESPIRATORY:  Mode: AC/ CMV (Assist Control/ Continuous Mandatory Ventilation)  RR (machine): 16  TV (machine): 400  FiO2: 30  PEEP: 5  MAP: 11  PIP: 39        IMAGING:   Recent imaging studies were reviewed.    LAB RESULTS:                          11.9   10.62 )-----------( 242      ( 08 Sep 2022 03:10 )             38.2           09-08    134<L>  |  93<L>  |  48.8<H>  ----------------------------<  218<H>  4.8   |  25.0  |  4.74<H>    Ca    9.3      08 Sep 2022 03:10  Phos  5.6     09-08  Mg     2.5     09-08    TPro  7.3  /  Alb  2.4<L>  /  TBili  0.7  /  DBili  x   /  AST  21  /  ALT  7   /  AlkPhos  441<H>  09-08                -----------------------------------------------------------------------------------------------------------------------------------------------------------------------------------    PHYSICAL EXAM:  General: Calm, intubated  HEENT: MMM  Neuro:  -Mental status- No acute distress  -CN- PERRL 3mm, EOMI, tongue midline, face symmetric    CV: RRR  Pulm: clear to auscultation  Abd: Soft, nontender, nondistended  Ext: no noted edema in lower ext  Skin: warm, dry       Chief complaint:   Patient is a 62y old  Female who presents with a chief complaint of severe IVH  + ASA 81m PTA (08 Sep 2022 07:45)    HPI:  Patient is a 62y old  Female who presents with a chief complaint of hypertensive crisis/ hyperglycemia ~600/ fever TMax: 100.7 w vomiting, who was declining clinically w lethargy since in ED, CTB was obtained and extensive B/L IVH casting 3rd and 4th vents were noted. pt has been intubated shortly after CT for poor exam/ agonal breaths/ GCS 4.  per d/w son, Feliciano, unable to reach Keshia, daughter/ HCP, full code and all measures to be done, R/B?A of EVD eplained alongside possibility of worsening hemorrhage and he understood and consent obtained via phone.    - LOC   - trauma reported   ?Headache/ unable to obtain from pt/ per ER RN pt did not endorse headache   + Nausea /+ Vomiting  pt was dropped off to ED by granddaughter     Mode: AC/ CMV (Assist Control/ Continuous Mandatory Ventilation)  RR (machine): 16  TV (machine): 0.4  FiO2: 50  PEEP: 5  PIP: 16    Vital Signs Last 24 Hrs  T(C): 38.2 (04 Sep 2022 13:58), Max: 38.2 (04 Sep 2022 13:58)  T(F): 100.7 (04 Sep 2022 13:58), Max: 100.7 (04 Sep 2022 13:58)  HR: 73 (04 Sep 2022 17:36) (70 - 92)  BP: 134/71 (04 Sep 2022 17:36) (119/68 - 200/106)  BP(mean): --  RR: 16 (04 Sep 2022 17:36) (16 - 21)  SpO2: 100% (04 Sep 2022 17:36) (93% - 100%)    Parameters below as of 04 Sep 2022 17:36  Patient On (Oxygen Delivery Method): ventilator (04 Sep 2022 18:00)      24hr EVENTS:  no acute issues    ROS: [ x]  Unable to assess due to mental status   All other systems negative    -----------------------------------------------------------------------------------------------------------------------------------------------------------------------------------  ICU Vital Signs Last 24 Hrs  T(C): 36.2 (08 Sep 2022 06:00), Max: 36.6 (08 Sep 2022 00:03)  T(F): 97.2 (08 Sep 2022 06:00), Max: 97.8 (08 Sep 2022 00:03)  HR: 61 (08 Sep 2022 09:00) (56 - 63)  BP: 113/63 (08 Sep 2022 09:00) (95/51 - 129/59)  BP(mean): 79 (08 Sep 2022 09:00) (65 - 82)  ABP: --  ABP(mean): --  RR: 16 (08 Sep 2022 09:00) (16 - 23)  SpO2: 98% (08 Sep 2022 09:00) (96% - 100%)    O2 Parameters below as of 08 Sep 2022 08:00  Patient On (Oxygen Delivery Method): ventilator    O2 Concentration (%): 30        I&O's Summary    07 Sep 2022 07:01  -  08 Sep 2022 07:00  --------------------------------------------------------  IN: 1210 mL / OUT: 109 mL / NET: 1101 mL    08 Sep 2022 07:01  -  08 Sep 2022 09:38  --------------------------------------------------------  IN: 145 mL / OUT: 1 mL / NET: 144 mL        MEDICATIONS  (STANDING):  atorvastatin 40 milliGRAM(s) Oral at bedtime  chlorhexidine 0.12% Liquid 15 milliLiter(s) Oral Mucosa every 12 hours  chlorhexidine 2% Cloths 1 Application(s) Topical daily  dextrose 5%. 1000 milliLiter(s) (50 mL/Hr) IV Continuous <Continuous>  dextrose 5%. 1000 milliLiter(s) (100 mL/Hr) IV Continuous <Continuous>  dextrose 50% Injectable 25 Gram(s) IV Push once  dextrose 50% Injectable 12.5 Gram(s) IV Push once  dextrose 50% Injectable 25 Gram(s) IV Push once  glucagon  Injectable 1 milliGRAM(s) IntraMuscular once  insulin glargine Injectable (LANTUS) 8 Unit(s) SubCutaneous at bedtime  insulin lispro (ADMELOG) corrective regimen sliding scale   SubCutaneous every 6 hours  levothyroxine 75 MICROGram(s) Enteral Tube daily  metoprolol tartrate 25 milliGRAM(s) Oral two times a day  pantoprazole   Suspension 40 milliGRAM(s) Oral before breakfast  piperacillin/tazobactam IVPB.. 3.375 Gram(s) IV Intermittent every 12 hours      RESPIRATORY:  Mode: AC/ CMV (Assist Control/ Continuous Mandatory Ventilation)  RR (machine): 16  TV (machine): 400  FiO2: 30  PEEP: 5  MAP: 11  PIP: 39        IMAGING:   Recent imaging studies were reviewed.    LAB RESULTS:                          11.9   10.62 )-----------( 242      ( 08 Sep 2022 03:10 )             38.2           09-08    134<L>  |  93<L>  |  48.8<H>  ----------------------------<  218<H>  4.8   |  25.0  |  4.74<H>    Ca    9.3      08 Sep 2022 03:10  Phos  5.6     09-08  Mg     2.5     09-08    TPro  7.3  /  Alb  2.4<L>  /  TBili  0.7  /  DBili  x   /  AST  21  /  ALT  7   /  AlkPhos  441<H>  09-08      -----------------------------------------------------------------------------------------------------------------------------------------------------------------------------------    PHYSICAL EXAM:  General: Calm, intubated  HEENT: MMM  biteblock in place    Neuro:  -Mental status- No acute distress, no EO  no cough/gag/corneals  -CN- pupils NR, EOMI, tongue midline, face symmetric  flaccid in all ext    CV: RRR  Pulm: clear to auscultation  Abd: Soft, nontender, nondistended  Ext: no noted edema in lower ext  Skin: warm, dry    copious oral, minimal midline secretions

## 2022-09-08 NOTE — PROGRESS NOTE ADULT - SUBJECTIVE AND OBJECTIVE BOX
HPI:  Patient is a 62y old  Female who presents with a chief complaint of hypertensive crisis/ hyperglycemia ~600/ fever TMax: 100.7 w vomiting, who was declining clinically w lethargy since in ED, CTB was obtained and extensive B/L IVH casting 3rd and 4th vents were noted. pt has been intubated shortly after CT for poor exam/ agonal breaths/ GCS 4.  per d/w son, Feliciano, unable to reach Keshia, daughter/ HCP, full code and all measures to be done, R/B?A of EVD eplained alongside possibility of worsening hemorrhage and he understood and consent obtained via phone.    Patient On (Oxygen Delivery Method): ventilator (04 Sep 2022 18:00)      INTERVAL HPI/OVERNIGHT EVENTS:  Pt seen and evaluated this morning. No acute neurosurgical events reported from overnight.        Vital Signs Last 24 Hrs  T(C): 36.2 (08 Sep 2022 06:00), Max: 36.6 (07 Sep 2022 08:15)  T(F): 97.2 (08 Sep 2022 06:00), Max: 97.8 (07 Sep 2022 08:15)  HR: 56 (08 Sep 2022 07:00) (56 - 63)  BP: 114/60 (08 Sep 2022 07:00) (95/51 - 129/59)  BP(mean): 78 (08 Sep 2022 07:00) (65 - 81)  RR: 16 (08 Sep 2022 07:00) (16 - 23)  SpO2: 98% (08 Sep 2022 07:00) (96% - 100%)    Parameters below as of 08 Sep 2022 07:00  Patient On (Oxygen Delivery Method): ventilator    O2 Concentration (%): 30      PHYSICAL EXAM:  GENERAL: NAD, well-groomed  HEAD:  Atraumatic, normocephalic  MENTAL STATUS: No EO, No FC  CRANIAL NERVES: dysconjugate gaze, R pupil 5 NR, left pupil 4 NR  CHEST/LUNG: Clear to auscultation bilaterally; no rales, rhonchi, wheezing, appreciated  HEART: +S1/+S2; Regular rate and rhythm; no murmurs, rub  ABDOMEN: Soft, nontender, nondistended; bowel sounds present   EXTREMITIES:  2+ peripheral pulses, no clubbing, cyanosis, or edema  SKIN: Warm, dry      LABS:                        11.9   10.62 )-----------( 242      ( 08 Sep 2022 03:10 )             38.2     09-08    134<L>  |  93<L>  |  48.8<H>  ----------------------------<  218<H>  4.8   |  25.0  |  4.74<H>    Ca    9.3      08 Sep 2022 03:10  Phos  5.6     09-08  Mg     2.5     09-08    TPro  7.3  /  Alb  2.4<L>  /  TBili  0.7  /  DBili  x   /  AST  21  /  ALT  7   /  AlkPhos  441<H>  09-08 09-07 @ 07:01  -  09-08 @ 07:00  --------------------------------------------------------  IN: 1210 mL / OUT: 109 mL / NET: 1101 mL    09-08 @ 07:01  -  09-08 @ 07:46  --------------------------------------------------------  IN: 25 mL / OUT: 0 mL / NET: 25 mL

## 2022-09-08 NOTE — PROGRESS NOTE ADULT - ASSESSMENT
62F with IVH / hydrocephalus, EVD placement     Plan   - q1 neuro checks   - -140   - Okay for diet   - SCDs only for dvt ppx   - Further plan pending am rounds with attending

## 2022-09-08 NOTE — PROGRESS NOTE ADULT - ASSESSMENT
62y o F with extensive medical problems, p/w hemorrhagic stroke, IVH/hydrocephalus brain compression requiring EVD placement. On admission a1c was 10.6%, endo consulted for same.  - Of note, family friend was at bedside who was patients caretaker. She spoke with daughter and confirmed her home diabetic regimen consisted of humalog 10 units BID, depending on blood sugar level.    1. T2DM, a1c 10.6%  - Lantus 8 units qhs  - Continue SSI    2. Hypothyroidism - TSH 14  - LT4 75 mcg PO daily  - Recheck levels in 4-6 weeks    3.IVH/ obstructive hydrocephalus  - EVD in place  - Care per neuro ICU

## 2022-09-08 NOTE — PROGRESS NOTE ADULT - ASSESSMENT
62y old  Female who presents with a chief complaint of hypertensive crisis/ hyperglycemia ~600/ fever TMax: 100.7 w vomiting, who was declining clinically w lethargy since in ED, CTB was obtained and extensive B/L IVH casting 3rd and 4th vents were noted. pt has been intubated shortly after CT for poor exam/ agonal breaths/ GCS 4.    ESRD  [facility]; Usually MWF but done today and then Sat - next qweek MWF  HTN emergency  :  BP now controlled- on Cardene > PO meds - now hydralazine to be held, decrease metoprolol to 25 qd for now and increase as needed      Large -IVH/ obstructive hydrocephalus, -->  intubated/ Emergent EVD placed  NS noted    Guarded prognosis

## 2022-09-08 NOTE — PROGRESS NOTE ADULT - ASSESSMENT
63 yo F with multiple co-morbidities, presented with large IVH/ obstructive hydrocephalus, concern for L BG ICH, score 3. S/p emergent EVD placement.  Nonocclusive thrombus in the proximal right basilar artery. Occluded left vertebral artery distal V4 segment.  H/o CAD on ASA; Afib not on AC due to GIB. Mixed CHF.  Acute resp failure. PNA.  Cirrhosis with moderate ascites. S/p paracentesis 9/5.  ESRD on HD.  Hyperglycemia, DM.    NEURO:   cont q4h neurochecks, off sedation, PRN fentanyl 25 for agitation/vent   maintain EVD @0, monitor outp and ICP  pain mgt: tylenol and oxy 5 prn  Palliative consult- GOC discussion with the family members      Activity: [x] mobilize as tolerated [] Bedrest [x] PT [x] OT [] PMNR    PULM: vent support, adjust as needed, SBT as tolerated  SpO2>92%  OOB    CV:  hold hydralazine and norvasc for HD today  dec metoprolol 25mg   -160  statin    RENAL: monitor I/O  HD on Wed, Renal involvement appreciated  anuric    GI:  changed TF in view of hypoglycemia events, BM regimen, PPI for ppx  rectal tube- liquid stools  PPI  Bowel regimen [x] senna [] other:    ENDO:   Goal euglycemia (-180)  SSI for now, monitor FS  synthroid    HEME/ONC:  VTE prophylaxis: [] SCDs [x] chemoprophylaxis held 24hrs post-bleed   SCDs, no AC in view of brain bleed    ID: afebrile   no leukocytosis  cont Zosyn for 7 days, follow Cx, ascitic fluid     MISC:    I affirm that this patient is critically ill and at high risk for sudden, fatal deterioration due to one or more of the above stated active issues.     This time does not include bedside procedures that are documented separately.       63 yo F with multiple co-morbidities, presented with large IVH/ obstructive hydrocephalus, concern for L BG ICH, score 3. S/p emergent EVD placement.  Nonocclusive thrombus in the proximal right basilar artery. Occluded left vertebral artery distal V4 segment.  H/o CAD on ASA; Afib not on AC due to GIB. Mixed CHF.  Acute resp failure. PNA.  Cirrhosis with moderate ascites. S/p paracentesis 9/5.  ESRD on HD.  Hyperglycemia, DM.    NEURO:   cont q4h neurochecks, off sedation, PRN fentanyl 25 for agitation/vent   maintain EVD @0, monitor outp and ICP  pain mgt: tylenol and oxy 5 prn  off sedation  Palliative consult- GOC discussion with the family members    Activity: [x] mobilize as tolerated [] Bedrest [x] PT [x] OT [] PMNR    PULM: vent support, adjust as needed, SBT as tolerated  SpO2>92%  OOB    CV:  hold hydralazine and norvasc for HD today  metoprolol 25mg   -160  statin    RENAL: monitor I/O  HD today, Renal involvement appreciated- removed 1.5L  anuric    GI:  changed TF in view of hypoglycemia events, BM regimen, PPI for ppx  rectal tube- liquid stools  PPI  add banatrol    ENDO:   Goal euglycemia (-180)  SSI for now, monitor FS  lantus 8units daily  synthroid    HEME/ONC:  VTE prophylaxis: [] SCDs [x] chemoprophylaxis  heparin  SCDs, no AC in view of brain bleed    ID: afebrile   no leukocytosis  cont Zosyn 5 of 7 days  cultures NGTD    MISC:    I affirm that this patient is critically ill and at high risk for sudden, fatal deterioration due to one or more of the above stated active issues.     This time does not include bedside procedures that are documented separately.

## 2022-09-08 NOTE — PROGRESS NOTE ADULT - SUBJECTIVE AND OBJECTIVE BOX
Patient seen and examined    I&O's Summary    07 Sep 2022 07:01  -  08 Sep 2022 07:00  --------------------------------------------------------  IN: 1210 mL / OUT: 109 mL / NET: 1101 mL    08 Sep 2022 07:01  -  08 Sep 2022 13:51  --------------------------------------------------------  IN: 305 mL / OUT: 1503 mL / NET: -1198 mL        REVIEW OF SYSTEMS: NA    CONSTITUTIONAL: No F/C  Remains unresponsive  Just finished HD without problems    Vital Signs Last 24 Hrs  T(C): 36.2 (08 Sep 2022 06:00), Max: 36.6 (08 Sep 2022 00:03)  T(F): 97.2 (08 Sep 2022 06:00), Max: 97.8 (08 Sep 2022 00:03)  HR: 62 (08 Sep 2022 13:00) (56 - 64)  BP: 107/52 (08 Sep 2022 13:00) (92/59 - 129/59)  BP(mean): 70 (08 Sep 2022 13:00) (65 - 82)  RR: 16 (08 Sep 2022 13:00) (16 - 23)  SpO2: 98% (08 Sep 2022 13:00) (96% - 100%)    Parameters below as of 08 Sep 2022 12:00  Patient On (Oxygen Delivery Method): ventilator    O2 Concentration (%): 30    PHYSICAL EXAM:    GENERAL: NAD,   NECK: Supple, No JVD/Bruit  NERVOUS SYSTEM:  unresponsive,   CHEST:  No rales or rhonchi  HEART:  RRR, No murmur  ABDOMEN: Soft, NT/distended mildly BS+  EXTREMITIES:  No Edema;  SKIN: No rashes    LABS:                          11.9   10.62 )-----------( 242      ( 08 Sep 2022 03:10 )             38.2     09-08    134<L>  |  93<L>  |  48.8<H>  ----------------------------<  218<H>  4.8   |  25.0  |  4.74<H>    Ca    9.3      08 Sep 2022 03:10  Phos  5.6     09-08  Mg     2.5     09-08    TPro  7.3  /  Alb  2.4<L>  /  TBili  0.7  /  DBili  x   /  AST  21  /  ALT  7   /  AlkPhos  441<H>  09-08      MEDICATIONS  (STANDING):  acetaminophen     Tablet .. PRN  atorvastatin  chlorhexidine 0.12% Liquid  chlorhexidine 2% Cloths  dextrose 5%.  dextrose 5%.  dextrose 50% Injectable  dextrose 50% Injectable  dextrose 50% Injectable  dextrose Oral Gel PRN  fentaNYL    Injectable PRN  glucagon  Injectable  heparin   Injectable  insulin glargine Injectable (LANTUS)  insulin lispro (ADMELOG) corrective regimen sliding scale  levothyroxine  metoprolol tartrate  ondansetron Injectable PRN  pantoprazole   Suspension  piperacillin/tazobactam IVPB..

## 2022-09-09 NOTE — PROGRESS NOTE ADULT - ASSESSMENT
62y o F with extensive medical problems, p/w hemorrhagic stroke, IVH/hydrocephalus brain compression requiring EVD placement. On admission a1c was 10.6%, endo consulted for same.  - Of note, family friend was at bedside who was patients caretaker. She spoke with daughter and confirmed her home diabetic regimen consisted of humalog 10 units BID, depending on blood sugar level.    1. T2DM, a1c 10.6%  - Lantus increased to 10 units qhs, continue  - Continue SSI    2. Hypothyroidism - TSH 14  - LT4 75 mcg PO daily  - Recheck levels in 4-6 weeks    3.IVH/ obstructive hydrocephalus  - EVD in place  - Care per neuro ICU

## 2022-09-09 NOTE — PROGRESS NOTE ADULT - SUBJECTIVE AND OBJECTIVE BOX
CC:  Follow up GOC , Symptoms    OVERNIGHT EVENTS:  remains on vent    Present Symptoms:   Dyspnea:  No - on vent  Nausea/Vomiting:  No    Anxiety:  No    Depression: Unable  Fatigue:  Yes   Loss of appetite:    NA   Constipation: Not Reported      Pain:   No Signs            Character-            Duration-            Location-            Severity-    Pain AD Score:  [email or web address] (press ctrl + left click to view)    Review of Systems: Reviewed as above  Further ROS unable to  obtain due to poor mentation     MEDICATIONS  (STANDING):  atorvastatin 40 milliGRAM(s) Oral at bedtime  chlorhexidine 0.12% Liquid 15 milliLiter(s) Oral Mucosa every 12 hours  chlorhexidine 2% Cloths 1 Application(s) Topical daily  dextrose 5%. 1000 milliLiter(s) (50 mL/Hr) IV Continuous <Continuous>  dextrose 5%. 1000 milliLiter(s) (100 mL/Hr) IV Continuous <Continuous>  dextrose 50% Injectable 25 Gram(s) IV Push once  dextrose 50% Injectable 12.5 Gram(s) IV Push once  dextrose 50% Injectable 25 Gram(s) IV Push once  glucagon  Injectable 1 milliGRAM(s) IntraMuscular once  heparin   Injectable 5000 Unit(s) SubCutaneous every 12 hours  insulin glargine Injectable (LANTUS) 13 Unit(s) SubCutaneous at bedtime  insulin lispro (ADMELOG) corrective regimen sliding scale   SubCutaneous every 6 hours  levothyroxine 75 MICROGram(s) Enteral Tube daily  metoprolol tartrate 25 milliGRAM(s) Oral two times a day  pantoprazole   Suspension 40 milliGRAM(s) Oral before breakfast  piperacillin/tazobactam IVPB.. 3.375 Gram(s) IV Intermittent every 12 hours    MEDICATIONS  (PRN):  acetaminophen     Tablet .. 650 milliGRAM(s) Oral every 6 hours PRN Temp greater or equal to 38C (100.4F), Mild Pain (1 - 3)  dextrose Oral Gel 15 Gram(s) Oral once PRN Blood Glucose LESS THAN 70 milliGRAM(s)/deciliter  fentaNYL    Injectable 25 MICROGram(s) IV Push every 2 hours PRN Moderate Pain (4 - 6)  ondansetron Injectable 4 milliGRAM(s) IV Push every 6 hours PRN Nausea and/or Vomiting      PHYSICAL EXAM:    Vital Signs Last 24 Hrs  T(C): 36.4 (09 Sep 2022 08:00), Max: 39 (09 Sep 2022 04:13)  T(F): 97.5 (09 Sep 2022 08:00), Max: 102.2 (09 Sep 2022 04:13)  HR: 61 (09 Sep 2022 12:34) (60 - 86)  BP: 116/55 (09 Sep 2022 12:00) (107/50 - 140/67)  BP(mean): 75 (09 Sep 2022 12:00) (67 - 91)  RR: 16 (09 Sep 2022 12:00) (16 - 23)  SpO2: 99% (09 Sep 2022 12:34) (97% - 100%)    Parameters below as of 09 Sep 2022 12:00  Patient On (Oxygen Delivery Method): ventilator    O2 Concentration (%): 30    Karnofsky:10  %  General:  F non responsive      HEENT:  NCAT      + ET bute  Lungs: comfortable  non labored  CV:  RR  GI: soft NTND         : + farley      MSK: bedbound  Skin:  warm/dry  Neuro  AOx0    LABS:                          11.3   7.23  )-----------( 249      ( 09 Sep 2022 03:35 )             36.9     09-09    135  |  93<L>  |  35.2<H>  ----------------------------<  202<H>  5.0   |  28.0  |  3.71<H>    Ca    9.0      09 Sep 2022 03:35  Phos  4.1     09-09  Mg     2.3     09-09    TPro  7.3  /  Alb  2.4<L>  /  TBili  0.7  /  DBili  x   /  AST  21  /  ALT  7   /  AlkPhos  441<H>  09-08        I&O's Summary    08 Sep 2022 07:01  -  09 Sep 2022 07:00  --------------------------------------------------------  IN: 1235 mL / OUT: 1743 mL / NET: -508 mL    09 Sep 2022 07:01  -  09 Sep 2022 13:25  --------------------------------------------------------  IN: 240 mL / OUT: 18 mL / NET: 222 mL        RADIOLOGY & ADDITIONAL STUDIES:  < from: Xray Chest 1 View-PORTABLE IMMEDIATE (Xray Chest 1 View-PORTABLE IMMEDIATE .) (09.04.22 @ 17:31) >  ACC: [id number] EXAM:  XR CHEST PORTABLE IMMED 1V                        ACC: [id number] EXAM:  XR CHEST PORTABLE IMMED 1V                          PROCEDURE DATE:  09/04/2022          INTERPRETATION:  INDICATION: 62-year-old female with sepsis    COMPARISON: 5/21/2022    FINDINGS:    9/4/2022, 2:20 PM.  Heart/Vascular: Cardiomegaly  Pulmonary: Right upper and lower lung zone consolidative opacities.  Bones: Grossly within normal limits.      9/4/2022, 5:16 PM.  Interval intubation with tip of endotracheal tube in the low thoracic   trachea. Interval placement of nasogastric tube, identified across the   diaphragm, the tip is off the margin of the film. Progression of right   upper and lower lung zone consolidative opacity and new left lower lung   zone opacity with air bronchograms.      Impression:      Serial chest radiographs as above.    --- End of Report ---    < end of copied text >        ADVANCE DIRECTIVES/TREATMENT PREFERENCES:

## 2022-09-09 NOTE — PROGRESS NOTE ADULT - ASSESSMENT
Assessment  61yo F with extensive medical problems, p/w hemorrhagic stroke, IVH/hydrocephalus  brain compression requiring EVD placement  acute hypoxic respiratory failure   Severe hyperglycemia - resolved  hyponatremia resolving    Plan  - neuro checks q4hr  - EVD at 0, keep ICP <22  - keep nornothermic, tylenol prn  - keep BP , hydral/labetalol prn  - cotn vent support  - tolerating TF Nupro  - IVL  - nephrology following, pt is on HD (anuric)  - cont Zosyn for aspiration pneumonia  - SCD s for DVt prophylaxis  - poor prognosis  - cont lantus & SS  - cont synthroid  - cont GOC discussion (pt was made DNR), Palliative is following  - Final plan pending morning rounds with attending

## 2022-09-09 NOTE — PROGRESS NOTE ADULT - NSPROGADDITIONALINFOA_GEN_ALL_CORE
Neurosurgery Attending Attestation:    Patient seen and examined at bedside by ACP team. Agree with plan and note as documented above.

## 2022-09-09 NOTE — PROGRESS NOTE ADULT - SUBJECTIVE AND OBJECTIVE BOX
NEPHROLOGY INTERVAL HPI/OVERNIGHT EVENTS:  pt remains vented    MEDICATIONS  (STANDING):  atorvastatin 40 milliGRAM(s) Oral at bedtime  chlorhexidine 0.12% Liquid 15 milliLiter(s) Oral Mucosa every 12 hours  chlorhexidine 2% Cloths 1 Application(s) Topical daily  dextrose 5%. 1000 milliLiter(s) (50 mL/Hr) IV Continuous <Continuous>  dextrose 5%. 1000 milliLiter(s) (100 mL/Hr) IV Continuous <Continuous>  dextrose 50% Injectable 25 Gram(s) IV Push once  dextrose 50% Injectable 12.5 Gram(s) IV Push once  dextrose 50% Injectable 25 Gram(s) IV Push once  glucagon  Injectable 1 milliGRAM(s) IntraMuscular once  heparin   Injectable 5000 Unit(s) SubCutaneous every 12 hours  insulin glargine Injectable (LANTUS) 13 Unit(s) SubCutaneous at bedtime  insulin lispro (ADMELOG) corrective regimen sliding scale   SubCutaneous every 6 hours  levothyroxine 75 MICROGram(s) Enteral Tube daily  metoprolol tartrate 25 milliGRAM(s) Oral two times a day  pantoprazole   Suspension 40 milliGRAM(s) Oral before breakfast  piperacillin/tazobactam IVPB.. 3.375 Gram(s) IV Intermittent every 12 hours    MEDICATIONS  (PRN):  acetaminophen     Tablet .. 650 milliGRAM(s) Oral every 6 hours PRN Temp greater or equal to 38C (100.4F), Mild Pain (1 - 3)  dextrose Oral Gel 15 Gram(s) Oral once PRN Blood Glucose LESS THAN 70 milliGRAM(s)/deciliter  fentaNYL    Injectable 25 MICROGram(s) IV Push every 2 hours PRN Moderate Pain (4 - 6)  ondansetron Injectable 4 milliGRAM(s) IV Push every 6 hours PRN Nausea and/or Vomiting      Allergies    eggs (Anaphylaxis)  No Known Drug Allergies          Vital Signs Last 24 Hrs  T(C): 36.9 (09 Sep 2022 14:00), Max: 39 (09 Sep 2022 04:13)  T(F): 98.4 (09 Sep 2022 14:00), Max: 102.2 (09 Sep 2022 04:13)  HR: 73 (09 Sep 2022 14:00) (60 - 86)  BP: 127/55 (09 Sep 2022 14:00) (107/50 - 140/67)  BP(mean): 78 (09 Sep 2022 14:00) (67 - 91)  RR: 16 (09 Sep 2022 14:00) (16 - 23)  SpO2: 100% (09 Sep 2022 14:00) (97% - 100%)    Parameters below as of 09 Sep 2022 14:00  Patient On (Oxygen Delivery Method): ventilator    O2 Concentration (%): 30    PHYSICAL EXAM:  GENERAL: Vented  HEENT: Oral intubation  NECK: Supple, No JVD  NERVOUS SYSTEM:   CHEST:  Diminished BS anteriorly  HEART:  RRR, No rub  ABDOMEN: Soft, NT/distended mildly BS+  EXTREMITIES:  + dependent edema  SKIN: No rashes    LABS:                        11.3   7.23  )-----------( 249      ( 09 Sep 2022 03:35 )             36.9     09-09    135  |  93<L>  |  35.2<H>  ----------------------------<  202<H>  5.0   |  28.0  |  3.71<H>    Ca    9.0      09 Sep 2022 03:35  Phos  4.1     09-09  Mg     2.3     09-09    TPro  7.3  /  Alb  2.4<L>  /  TBili  0.7  /  DBili  x   /  AST  21  /  ALT  7   /  AlkPhos  441<H>  09-08        Magnesium, Serum: 2.3 mg/dL (09-09 @ 03:35)  Phosphorus Level, Serum: 4.1 mg/dL (09-09 @ 03:35)      RADIOLOGY & ADDITIONAL TESTS:

## 2022-09-09 NOTE — PROGRESS NOTE ADULT - SUBJECTIVE AND OBJECTIVE BOX
CC: Follow up diabetes management    INTERVAL HPI/OVERNIGHT EVENTS:  No acute changes, remains intubated    ROS: Unable to obtain    MEDICATIONS  (STANDING):  atorvastatin 40 milliGRAM(s) Oral at bedtime  chlorhexidine 0.12% Liquid 15 milliLiter(s) Oral Mucosa every 12 hours  chlorhexidine 2% Cloths 1 Application(s) Topical daily  dextrose 5%. 1000 milliLiter(s) (50 mL/Hr) IV Continuous <Continuous>  dextrose 5%. 1000 milliLiter(s) (100 mL/Hr) IV Continuous <Continuous>  dextrose 50% Injectable 25 Gram(s) IV Push once  dextrose 50% Injectable 12.5 Gram(s) IV Push once  dextrose 50% Injectable 25 Gram(s) IV Push once  glucagon  Injectable 1 milliGRAM(s) IntraMuscular once  heparin   Injectable 5000 Unit(s) SubCutaneous every 12 hours  insulin glargine Injectable (LANTUS) 10 Unit(s) SubCutaneous at bedtime  insulin lispro (ADMELOG) corrective regimen sliding scale   SubCutaneous every 6 hours  levothyroxine 75 MICROGram(s) Enteral Tube daily  metoprolol tartrate 25 milliGRAM(s) Oral two times a day  pantoprazole   Suspension 40 milliGRAM(s) Oral before breakfast  piperacillin/tazobactam IVPB.. 3.375 Gram(s) IV Intermittent every 12 hours    MEDICATIONS  (PRN):  acetaminophen     Tablet .. 650 milliGRAM(s) Oral every 6 hours PRN Temp greater or equal to 38C (100.4F), Mild Pain (1 - 3)  dextrose Oral Gel 15 Gram(s) Oral once PRN Blood Glucose LESS THAN 70 milliGRAM(s)/deciliter  fentaNYL    Injectable 25 MICROGram(s) IV Push every 2 hours PRN Moderate Pain (4 - 6)  ondansetron Injectable 4 milliGRAM(s) IV Push every 6 hours PRN Nausea and/or Vomiting    Allergies  eggs (Anaphylaxis)  No Known Drug Allergies    Vital Signs Last 24 Hrs  T(C): 36.4 (09 Sep 2022 08:00), Max: 39 (09 Sep 2022 04:13)  T(F): 97.5 (09 Sep 2022 08:00), Max: 102.2 (09 Sep 2022 04:13)  HR: 63 (09 Sep 2022 10:00) (60 - 86)  BP: 119/57 (09 Sep 2022 10:00) (92/59 - 140/67)  BP(mean): 76 (09 Sep 2022 10:00) (67 - 91)  RR: 16 (09 Sep 2022 10:00) (16 - 23)  SpO2: 98% (09 Sep 2022 10:00) (97% - 99%)    Parameters below as of 09 Sep 2022 10:00  Patient On (Oxygen Delivery Method): ventilator    O2 Concentration (%): 30    PHYSICAL EXAM:  General: Intubated  Respiratory: CTAB  Cardiac: +S1, S2, no m/r/g  GI: +BS, soft, non tender  Extremities: No peripheral edema, no pedal lesions  Neuro: Does not follow commands      LABS:                        11.3   7.23  )-----------( 249      ( 09 Sep 2022 03:35 )             36.9     09-09    135  |  93<L>  |  35.2<H>  ----------------------------<  202<H>  5.0   |  28.0  |  3.71<H>    Ca    9.0      09 Sep 2022 03:35  Phos  4.1     09-09  Mg     2.3     09-09    TPro  7.3  /  Alb  2.4<L>  /  TBili  0.7  /  DBili  x   /  AST  21  /  ALT  7   /  AlkPhos  441<H>  09-08      POCT Blood Glucose.: 192 mg/dL (09-08-22 @ 23:02)  POCT Blood Glucose.: 218 mg/dL (09-08-22 @ 18:20)  POCT Blood Glucose.: 243 mg/dL (09-08-22 @ 18:18)  POCT Blood Glucose.: 124 mg/dL (09-08-22 @ 11:14)    Thyroid Stimulating Hormone, Serum: 11.36 uIU/mL (09-05-22 @ 13:00)  Thyroid Stimulating Hormone, Serum: 14.44 uIU/mL (09-05-22 @ 02:41)

## 2022-09-09 NOTE — PROGRESS NOTE ADULT - SUBJECTIVE AND OBJECTIVE BOX
HPI: 62F w/ PMH HTN, HLD, ESRD on HD (M/W/F), OP, CHF (EF=40-45%), DM, pAfib not on AC sec to GI bleed, hypothyroid, anemia of chronic disease, cirrhosis (?VELA) w/ frequent ascitis, recent admit to PMN & tx w/ Zosyn. Pt was dropped off in ER w/ hypertensive crisis/ hyperglycemia ~600/ fever TMax: 100.7 w/ vomitine & decline in MS requiring intubation for airway protection. CT showed extensive IVH/Hydro.     IVH=3, NIHSS=33, mRs=unknown (scores on admission).        Interval history: Patient received one unit platelets for ASA use & DDAVP 28mg. Emergent EVD placed, CT post EVD done, cath in 3rd vent. CTA non occlusive thrombus R vert V4/vasilar, occluded L V1 segment. Patient required Insulin gtt for severe hypoglycemia, nicardipine gtt for BP control. a-line & midline for multiple drips. (unable to use Left UE sec to AVF)  Nephrology rec to d/c hypertonic saline & started on HD. Patient was able to get off insulin gtt & started on lantus & sliding scale, Endo consulted. Pt required paracentesis with neg for infection. Patient has been febrile with presumed aspiration pneumonia requiring tx w/ Zosyn, pancultures neg. Patient has been hypotensive during HD, all her BP meds d/jonatan except for metoprolol low dose.   Family made pt DNR after ongoing GOC discussions & poor prognosis with no meaningful recovery    Physical Exam:  Constitutional: Intubated, not on sedation    Neuro  * Mental Status:  Intubated  * Cranial Nerves: No EO, Pupils R oval, L round both ~4.5mm non-reactive, No corneals, weak gag, no cough  * Motor: no movement to pain in UE, minimal WD in LE  * Sensory: Sensation to noxious stimuli  * Reflexes: Not assessed  * Gait: Not assessed  * Wound: c/d/i, EVD     Vital Signs Last 24 Hrs  T(C): 37.7 (08 Sep 2022 23:49), Max: 37.7 (08 Sep 2022 23:49)  T(F): 99.9 (08 Sep 2022 23:49), Max: 99.9 (08 Sep 2022 23:49)  HR: 80 (08 Sep 2022 23:00) (56 - 80)  BP: 118/55 (08 Sep 2022 23:00) (92/59 - 129/59)  BP(mean): 75 (08 Sep 2022 23:00) (67 - 82)  RR: 23 (08 Sep 2022 23:00) (16 - 23)  SpO2: 98% (08 Sep 2022 23:00) (97% - 99%)    Labs:                       11.9   10.62 )-----------( 242      ( 08 Sep 2022 03:10 )             38.2     134<L>  |  93<L>  |  48.8<H>  ----------------------------<  218<H>  4.8   |  25.0  |  4.74<H>    Ca    9.3      08 Sep 2022 03:10  Phos  5.6     09-08  Mg     2.5     09-08    TPro  7.3  /  Alb  2.4<L>  /  TBili  0.7  /  DBili  x   /  AST  21  /  ALT  7   /  AlkPhos  441<H>  09-08  LIVER FUNCTIONS - ( 08 Sep 2022 03:10 )  Alb: 2.4 g/dL / Pro: 7.3 g/dL / ALK PHOS: 441 U/L / ALT: 7 U/L / AST: 21 U/L / GGT: x           Neuro imaging  CT Head No Cont (09.05.22 @ 04:44) >  IMPRESSION:  A right frontal approach external ventricular drain terminates in third   ventricle.  Mild postprocedural pneumocephalus.    Severe intraventricular hemorrhage with casting the ventricular system   and moderate hydrocephalus is stable.    New mild subarachnoid hemorrhage within the cerebral hemispheres and   sylvian fissures bilaterally probably reflects recirculation of   intraventricular blood products, rather than new hemorrhage.    There is suspicion for underlying left thalamic parenchymal hemorrhage,   which is difficult to distinguish from the intraventricular hemorrhage in   the adjacent third ventricle. Follow-up MRI may be obtained for further   evaluation.

## 2022-09-09 NOTE — PROGRESS NOTE ADULT - ASSESSMENT
62yr old  woman with Past MHX   ESRD on HD, OP, CHF, DM, PAF  hypertension, hyperlipidemia. anemia,  cirrhosis admitted with IVH with poor neurological exam    IVH  poor neurological status  neurochecks  plan per NSICU    Respiratory Failure  on vent - wean per NSICU    Cirrhosis/Ascites  on IV Zosyn - NGTD    ESRD  HD per renal    Encounter for Palliative Care  Spoke to daughter.  See GOC note.  Daughter informed of mother's condition and poor outlook.  Inquired  mother's wishes for long term vent.    She states she will speak to the rest of the family and discuss " unplugging".    Emotional support.

## 2022-09-09 NOTE — PROGRESS NOTE ADULT - ASSESSMENT
61 yo F with multiple co-morbidities, presented with large IVH/ obstructive hydrocephalus, concern for L BG ICH, score 3. S/p emergent EVD placement.  Nonocclusive thrombus in the proximal right basilar artery. Occluded left vertebral artery distal V4 segment.  H/o CAD on ASA; Afib not on AC due to GIB. Mixed CHF.  Acute resp failure. PNA.  Cirrhosis with moderate ascites. S/p paracentesis 9/5.  ESRD on HD.  Hyperglycemia, DM.    NEURO:   cont q4h neurochecks, off sedation, PRN fentanyl 25 for agitation/vent   maintain EVD @0, monitor outp and ICP  pain mgt: tylenol and oxy 5 prn  off sedation  Palliative consult- GOC discussion with the family members    Activity: [x] mobilize as tolerated [] Bedrest [x] PT [x] OT [] PMNR    PULM: vent support, adjust as needed, SBT as tolerated  SpO2>92%  OOB    CV:  hold hydralazine and norvasc for HD today  metoprolol 25mg   -160  statin    RENAL: monitor I/O  HD today, Renal involvement appreciated- removed 1.5L  anuric    GI:  changed TF in view of hypoglycemia events, BM regimen, PPI for ppx  rectal tube- liquid stools  PPI  add banatrol    ENDO:   Goal euglycemia (-180)  SSI for now, monitor FS  lantus 8units daily  synthroid    HEME/ONC:  VTE prophylaxis: [] SCDs [x] chemoprophylaxis  heparin  SCDs, no AC in view of brain bleed    ID: afebrile   no leukocytosis  cont Zosyn 5 of 7 days  cultures NGTD    MISC:    I affirm that this patient is critically ill and at high risk for sudden, fatal deterioration due to one or more of the above stated active issues.     This time does not include bedside procedures that are documented separately.       61 yo F with multiple co-morbidities, presented with large IVH/ obstructive hydrocephalus, concern for L BG ICH, score 3. S/p emergent EVD placement.  Nonocclusive thrombus in the proximal right basilar artery. Occluded left vertebral artery distal V4 segment.  H/o CAD on ASA; Afib not on AC due to GIB. Mixed CHF.  Acute resp failure. PNA.  Cirrhosis with moderate ascites. S/p paracentesis 9/5.  ESRD on HD.  Hyperglycemia, DM.    NEURO:   cont q4h neurochecks, off sedation, PRN fentanyl 25 for agitation/vent   maintain EVD @0, monitor outp and ICP  pain mgt: tylenol and oxy 5 prn  off sedation  Palliative consult- GOC discussion with the family members    Activity: [x] mobilize as tolerated [] Bedrest [x] PT [x] OT [] PMNR    PULM: vent support, adjust as needed, SBT as tolerated  SpO2>92%  OOB    CV:  hold hydralazine and norvasc for HD   metoprolol 25mg   -160  statin    RENAL: monitor I/O  HD 9/8, Renal involvement appreciated- removed 1.5L  anuric    GI:  TF- BM regimen, PPI for ppx  rectal tube- liquid stools  PPI  banatrol    ENDO:   Goal euglycemia (-180)  SSI for now, monitor FS  lantus 13units daily  synthroid    HEME/ONC:  VTE prophylaxis: [] SCDs [x] chemoprophylaxis heparin  SCDs, no AC in view of brain bleed    ID: afebrile   no leukocytosis  cont Zosyn 6 of 7 days  cultures NGTD    MISC:    I affirm that this patient is critically ill and at high risk for sudden, fatal deterioration due to one or more of the above stated active issues.     This time does not include bedside procedures that are documented separately.

## 2022-09-09 NOTE — PROGRESS NOTE ADULT - SUBJECTIVE AND OBJECTIVE BOX
Chief complaint:   Patient is a 62y old  Female who presents with a chief complaint of Hemorrhagic stroke (09 Sep 2022 00:24)    HPI:  Patient is a 62y old  Female who presents with a chief complaint of hypertensive crisis/ hyperglycemia ~600/ fever TMax: 100.7 w vomiting, who was declining clinically w lethargy since in ED, CTB was obtained and extensive B/L IVH casting 3rd and 4th vents were noted. pt has been intubated shortly after CT for poor exam/ agonal breaths/ GCS 4.  per d/w son, Feliciano, unable to reach Keshia, daughter/ HCP, full code and all measures to be done, R/B?A of EVD eplained alongside possibility of worsening hemorrhage and he understood and consent obtained via phone.    - LOC   - trauma reported   ?Headache/ unable to obtain from pt/ per ER RN pt did not endorse headache   + Nausea /+ Vomiting  pt was dropped off to ED by granddaughter     Mode: AC/ CMV (Assist Control/ Continuous Mandatory Ventilation)  RR (machine): 16  TV (machine): 0.4  FiO2: 50  PEEP: 5  PIP: 16    Vital Signs Last 24 Hrs  T(C): 38.2 (04 Sep 2022 13:58), Max: 38.2 (04 Sep 2022 13:58)  T(F): 100.7 (04 Sep 2022 13:58), Max: 100.7 (04 Sep 2022 13:58)  HR: 73 (04 Sep 2022 17:36) (70 - 92)  BP: 134/71 (04 Sep 2022 17:36) (119/68 - 200/106)  BP(mean): --  RR: 16 (04 Sep 2022 17:36) (16 - 21)  SpO2: 100% (04 Sep 2022 17:36) (93% - 100%)    Parameters below as of 04 Sep 2022 17:36  Patient On (Oxygen Delivery Method): ventilator (04 Sep 2022 18:00)        24hr EVENTS:      ROS: [ ]  Unable to assess due to mental status   All other systems negative    -----------------------------------------------------------------------------------------------------------------------------------------------------------------------------------  ICU Vital Signs Last 24 Hrs  T(C): 36.4 (09 Sep 2022 08:00), Max: 39 (09 Sep 2022 04:13)  T(F): 97.5 (09 Sep 2022 08:00), Max: 102.2 (09 Sep 2022 04:13)  HR: 60 (09 Sep 2022 08:00) (59 - 86)  BP: 118/55 (09 Sep 2022 08:00) (92/59 - 140/67)  BP(mean): 75 (09 Sep 2022 08:00) (67 - 91)  ABP: --  ABP(mean): --  RR: 16 (09 Sep 2022 08:00) (16 - 23)  SpO2: 98% (09 Sep 2022 08:00) (97% - 99%)    O2 Parameters below as of 09 Sep 2022 08:00  Patient On (Oxygen Delivery Method): ventilator    O2 Concentration (%): 30        I&O's Summary    08 Sep 2022 07:01  -  09 Sep 2022 07:00  --------------------------------------------------------  IN: 1235 mL / OUT: 1743 mL / NET: -508 mL        MEDICATIONS  (STANDING):  atorvastatin 40 milliGRAM(s) Oral at bedtime  chlorhexidine 0.12% Liquid 15 milliLiter(s) Oral Mucosa every 12 hours  chlorhexidine 2% Cloths 1 Application(s) Topical daily  dextrose 5%. 1000 milliLiter(s) (50 mL/Hr) IV Continuous <Continuous>  dextrose 5%. 1000 milliLiter(s) (100 mL/Hr) IV Continuous <Continuous>  dextrose 50% Injectable 25 Gram(s) IV Push once  dextrose 50% Injectable 12.5 Gram(s) IV Push once  dextrose 50% Injectable 25 Gram(s) IV Push once  glucagon  Injectable 1 milliGRAM(s) IntraMuscular once  heparin   Injectable 5000 Unit(s) SubCutaneous every 12 hours  insulin glargine Injectable (LANTUS) 10 Unit(s) SubCutaneous at bedtime  insulin lispro (ADMELOG) corrective regimen sliding scale   SubCutaneous every 6 hours  levothyroxine 75 MICROGram(s) Enteral Tube daily  metoprolol tartrate 25 milliGRAM(s) Oral two times a day  pantoprazole   Suspension 40 milliGRAM(s) Oral before breakfast  piperacillin/tazobactam IVPB.. 3.375 Gram(s) IV Intermittent every 12 hours      RESPIRATORY:  Mode: AC/ CMV (Assist Control/ Continuous Mandatory Ventilation)  RR (machine): 16  TV (machine): 400  FiO2: 30  PEEP: 5  MAP: 10  PIP: 33        IMAGING:   Recent imaging studies were reviewed.    LAB RESULTS:                          11.3   7.23  )-----------( 249      ( 09 Sep 2022 03:35 )             36.9           09-09    135  |  93<L>  |  35.2<H>  ----------------------------<  202<H>  5.0   |  28.0  |  3.71<H>    Ca    9.0      09 Sep 2022 03:35  Phos  4.1     09-09  Mg     2.3     09-09    TPro  7.3  /  Alb  2.4<L>  /  TBili  0.7  /  DBili  x   /  AST  21  /  ALT  7   /  AlkPhos  441<H>  09-08                -----------------------------------------------------------------------------------------------------------------------------------------------------------------------------------    PHYSICAL EXAM:  General: Calm, intubated  HEENT: MMM  biteblock in place  Neuro:  -Mental status- No acute distress, no EO  no cough/gag/corneals  -CN- pupils NR, EOMI, tongue midline, face symmetric  flaccid in all ext  *overbreathing vent occasionally    CV: RRR  Pulm: clear to auscultation  Abd: Soft, nontender, nondistended  Ext: no noted edema in lower ext  Skin: warm, dry    copious oral, minimal midline secretions       Chief complaint:   Patient is a 62y old  Female who presents with a chief complaint of Hemorrhagic stroke (09 Sep 2022 00:24)    HPI:  Patient is a 62y old  Female who presents with a chief complaint of hypertensive crisis/ hyperglycemia ~600/ fever TMax: 100.7 w vomiting, who was declining clinically w lethargy since in ED, CTB was obtained and extensive B/L IVH casting 3rd and 4th vents were noted. pt has been intubated shortly after CT for poor exam/ agonal breaths/ GCS 4.  per d/w son, Feliciano, unable to reach Keshia, daughter/ HCP, full code and all measures to be done, R/B?A of EVD eplained alongside possibility of worsening hemorrhage and he understood and consent obtained via phone.    - LOC   - trauma reported   ?Headache/ unable to obtain from pt/ per ER RN pt did not endorse headache   + Nausea /+ Vomiting  pt was dropped off to ED by granddaughter     Mode: AC/ CMV (Assist Control/ Continuous Mandatory Ventilation)  RR (machine): 16  TV (machine): 0.4  FiO2: 50  PEEP: 5  PIP: 16    Vital Signs Last 24 Hrs  T(C): 38.2 (04 Sep 2022 13:58), Max: 38.2 (04 Sep 2022 13:58)  T(F): 100.7 (04 Sep 2022 13:58), Max: 100.7 (04 Sep 2022 13:58)  HR: 73 (04 Sep 2022 17:36) (70 - 92)  BP: 134/71 (04 Sep 2022 17:36) (119/68 - 200/106)  BP(mean): --  RR: 16 (04 Sep 2022 17:36) (16 - 21)  SpO2: 100% (04 Sep 2022 17:36) (93% - 100%)    Parameters below as of 04 Sep 2022 17:36  Patient On (Oxygen Delivery Method): ventilator (04 Sep 2022 18:00)        24hr EVENTS: no acute issues  overbreathing occasionally      ROS: [ x]  Unable to assess due to mental status   All other systems negative    -----------------------------------------------------------------------------------------------------------------------------------------------------------------------------------  ICU Vital Signs Last 24 Hrs  T(C): 36.4 (09 Sep 2022 08:00), Max: 39 (09 Sep 2022 04:13)  T(F): 97.5 (09 Sep 2022 08:00), Max: 102.2 (09 Sep 2022 04:13)  HR: 60 (09 Sep 2022 08:00) (59 - 86)  BP: 118/55 (09 Sep 2022 08:00) (92/59 - 140/67)  BP(mean): 75 (09 Sep 2022 08:00) (67 - 91)  ABP: --  ABP(mean): --  RR: 16 (09 Sep 2022 08:00) (16 - 23)  SpO2: 98% (09 Sep 2022 08:00) (97% - 99%)    O2 Parameters below as of 09 Sep 2022 08:00  Patient On (Oxygen Delivery Method): ventilator    O2 Concentration (%): 30        I&O's Summary    08 Sep 2022 07:01  -  09 Sep 2022 07:00  --------------------------------------------------------  IN: 1235 mL / OUT: 1743 mL / NET: -508 mL        MEDICATIONS  (STANDING):  atorvastatin 40 milliGRAM(s) Oral at bedtime  chlorhexidine 0.12% Liquid 15 milliLiter(s) Oral Mucosa every 12 hours  chlorhexidine 2% Cloths 1 Application(s) Topical daily  dextrose 5%. 1000 milliLiter(s) (50 mL/Hr) IV Continuous <Continuous>  dextrose 5%. 1000 milliLiter(s) (100 mL/Hr) IV Continuous <Continuous>  dextrose 50% Injectable 25 Gram(s) IV Push once  dextrose 50% Injectable 12.5 Gram(s) IV Push once  dextrose 50% Injectable 25 Gram(s) IV Push once  glucagon  Injectable 1 milliGRAM(s) IntraMuscular once  heparin   Injectable 5000 Unit(s) SubCutaneous every 12 hours  insulin glargine Injectable (LANTUS) 10 Unit(s) SubCutaneous at bedtime  insulin lispro (ADMELOG) corrective regimen sliding scale   SubCutaneous every 6 hours  levothyroxine 75 MICROGram(s) Enteral Tube daily  metoprolol tartrate 25 milliGRAM(s) Oral two times a day  pantoprazole   Suspension 40 milliGRAM(s) Oral before breakfast  piperacillin/tazobactam IVPB.. 3.375 Gram(s) IV Intermittent every 12 hours      RESPIRATORY:  Mode: AC/ CMV (Assist Control/ Continuous Mandatory Ventilation)  RR (machine): 16  TV (machine): 400  FiO2: 30  PEEP: 5  MAP: 10  PIP: 33        IMAGING:   Recent imaging studies were reviewed.    LAB RESULTS:                          11.3   7.23  )-----------( 249      ( 09 Sep 2022 03:35 )             36.9           09-09    135  |  93<L>  |  35.2<H>  ----------------------------<  202<H>  5.0   |  28.0  |  3.71<H>    Ca    9.0      09 Sep 2022 03:35  Phos  4.1     09-09  Mg     2.3     09-09    TPro  7.3  /  Alb  2.4<L>  /  TBili  0.7  /  DBili  x   /  AST  21  /  ALT  7   /  AlkPhos  441<H>  09-08                -----------------------------------------------------------------------------------------------------------------------------------------------------------------------------------    PHYSICAL EXAM:  General: Calm, intubated  HEENT: MMM  biteblock in place  Neuro:  -Mental status- No acute distress, no EO  no cough/gag/corneals  -CN- pupils NR, EOMI, tongue midline, face symmetric  flaccid in all ext  *overbreathing vent occasionally    CV: RRR  Pulm: clear to auscultation  Abd: Soft, nontender, nondistended  Ext: no noted edema in lower ext  Skin: warm, dry    copious oral, minimal midline secretions

## 2022-09-10 NOTE — PROGRESS NOTE ADULT - ASSESSMENT
Assessment  61yo F with extensive medical problems, p/w hemorrhagic stroke, IVH/hydrocephalus  brain compression requiring EVD placement  acute hypoxic respiratory failure, aspiration pneumonia   Severe hyperglycemia - resolved  hyponatremia resolved    Plan  - neuro checks q4hr  - EVD at 0, keep ICP <22  - keep nornothermic, tylenol prn  - keep BP , hydral/labetalol prn  - cotn vent support  - tolerating TF Nupro  - nephrology following, pt is on HD (anuric)  - cont Zosyn for aspiration pneumonia  - SCD s for DVt prophylaxis  - poor prognosis  - cont lantus & SS  - cont synthroid  - cont GOC discussion (pt was made DNR), Palliative is following  - Final plan pending morning rounds with attending

## 2022-09-10 NOTE — PROGRESS NOTE ADULT - SUBJECTIVE AND OBJECTIVE BOX
Chief complaint:   Patient is a 62y old  Female who presents with a chief complaint of severe IVH  + ASA 81m PTA (10 Sep 2022 07:10)    HPI:  Patient is a 62y old  Female who presents with a chief complaint of hypertensive crisis/ hyperglycemia ~600/ fever TMax: 100.7 w vomiting, who was declining clinically w lethargy since in ED, CTB was obtained and extensive B/L IVH casting 3rd and 4th vents were noted. pt has been intubated shortly after CT for poor exam/ agonal breaths/ GCS 4.  per d/w son, Feliciano, unable to reach Keshia, daughter/ HCP, full code and all measures to be done, R/B?A of EVD eplained alongside possibility of worsening hemorrhage and he understood and consent obtained via phone.    - LOC   - trauma reported   ?Headache/ unable to obtain from pt/ per ER RN pt did not endorse headache   + Nausea /+ Vomiting  pt was dropped off to ED by granddaughter     Mode: AC/ CMV (Assist Control/ Continuous Mandatory Ventilation)  RR (machine): 16  TV (machine): 0.4  FiO2: 50  PEEP: 5  PIP: 16    Vital Signs Last 24 Hrs  T(C): 38.2 (04 Sep 2022 13:58), Max: 38.2 (04 Sep 2022 13:58)  T(F): 100.7 (04 Sep 2022 13:58), Max: 100.7 (04 Sep 2022 13:58)  HR: 73 (04 Sep 2022 17:36) (70 - 92)  BP: 134/71 (04 Sep 2022 17:36) (119/68 - 200/106)  BP(mean): --  RR: 16 (04 Sep 2022 17:36) (16 - 21)  SpO2: 100% (04 Sep 2022 17:36) (93% - 100%)    Parameters below as of 04 Sep 2022 17:36  Patient On (Oxygen Delivery Method): ventilator (04 Sep 2022 18:00)        24hr EVENTS:      ROS: [ ]  Unable to assess due to mental status   All other systems negative    -----------------------------------------------------------------------------------------------------------------------------------------------------------------------------------  ICU Vital Signs Last 24 Hrs  T(C): 37.5 (10 Sep 2022 07:42), Max: 38 (10 Sep 2022 04:00)  T(F): 99.5 (10 Sep 2022 07:42), Max: 100.4 (10 Sep 2022 04:00)  HR: 69 (10 Sep 2022 08:45) (61 - 80)  BP: 120/59 (10 Sep 2022 08:00) (113/58 - 140/90)  BP(mean): 78 (10 Sep 2022 08:00) (72 - 90)  ABP: --  ABP(mean): --  RR: 16 (10 Sep 2022 08:00) (16 - 23)  SpO2: 97% (10 Sep 2022 08:45) (96% - 100%)    O2 Parameters below as of 10 Sep 2022 08:00  Patient On (Oxygen Delivery Method): ventilator    O2 Concentration (%): 30        I&O's Summary    09 Sep 2022 07:01  -  10 Sep 2022 07:00  --------------------------------------------------------  IN: 1320 mL / OUT: 171 mL / NET: 1149 mL    10 Sep 2022 07:01  -  10 Sep 2022 08:56  --------------------------------------------------------  IN: 80 mL / OUT: 8 mL / NET: 72 mL        MEDICATIONS  (STANDING):  atorvastatin 40 milliGRAM(s) Oral at bedtime  chlorhexidine 0.12% Liquid 15 milliLiter(s) Oral Mucosa every 12 hours  chlorhexidine 2% Cloths 1 Application(s) Topical daily  dextrose 5%. 1000 milliLiter(s) (50 mL/Hr) IV Continuous <Continuous>  dextrose 5%. 1000 milliLiter(s) (100 mL/Hr) IV Continuous <Continuous>  dextrose 50% Injectable 25 Gram(s) IV Push once  dextrose 50% Injectable 12.5 Gram(s) IV Push once  dextrose 50% Injectable 25 Gram(s) IV Push once  glucagon  Injectable 1 milliGRAM(s) IntraMuscular once  heparin   Injectable 5000 Unit(s) SubCutaneous every 12 hours  insulin glargine Injectable (LANTUS) 13 Unit(s) SubCutaneous at bedtime  insulin lispro (ADMELOG) corrective regimen sliding scale   SubCutaneous every 6 hours  levothyroxine 75 MICROGram(s) Enteral Tube daily  metoprolol tartrate 25 milliGRAM(s) Oral two times a day  pantoprazole   Suspension 40 milliGRAM(s) Oral before breakfast  piperacillin/tazobactam IVPB.. 3.375 Gram(s) IV Intermittent every 12 hours      RESPIRATORY:  Mode: AC/ CMV (Assist Control/ Continuous Mandatory Ventilation)  RR (machine): 16  TV (machine): 400  FiO2: 30  PEEP: 5  MAP: 12  PIP: 39        IMAGING:   Recent imaging studies were reviewed.    LAB RESULTS:                          11.9   7.34  )-----------( 288      ( 10 Sep 2022 02:37 )             37.5           09-10    135  |  93<L>  |  50.7<H>  ----------------------------<  165<H>  5.1   |  27.0  |  4.73<H>    Ca    9.5      10 Sep 2022 02:37  Phos  4.4     09-10  Mg     2.5     09-10                  -----------------------------------------------------------------------------------------------------------------------------------------------------------------------------------    PHYSICAL EXAM:  General: Calm, intubated  HEENT: MMM  Neuro:  -Mental status- No acute distress  -CN- PERRL 3mm, EOMI, tongue midline, face symmetric    CV: RRR  Pulm: clear to auscultation  Abd: Soft, nontender, nondistended  Ext: no noted edema in lower ext  Skin: warm, dry       Chief complaint:   Patient is a 62y old  Female who presents with a chief complaint of severe IVH  + ASA 81m PTA (10 Sep 2022 07:10)    HPI:  Patient is a 62y old  Female who presents with a chief complaint of hypertensive crisis/ hyperglycemia ~600/ fever TMax: 100.7 w vomiting, who was declining clinically w lethargy since in ED, CTB was obtained and extensive B/L IVH casting 3rd and 4th vents were noted. pt has been intubated shortly after CT for poor exam/ agonal breaths/ GCS 4.  per d/w son, Feliciano, unable to reach Keshia, daughter/ HCP, full code and all measures to be done, R/B?A of EVD eplained alongside possibility of worsening hemorrhage and he understood and consent obtained via phone.    - LOC   - trauma reported   ?Headache/ unable to obtain from pt/ per ER RN pt did not endorse headache   + Nausea /+ Vomiting  pt was dropped off to ED by granddaughter     Mode: AC/ CMV (Assist Control/ Continuous Mandatory Ventilation)  RR (machine): 16  TV (machine): 0.4  FiO2: 50  PEEP: 5  PIP: 16    Vital Signs Last 24 Hrs  T(C): 38.2 (04 Sep 2022 13:58), Max: 38.2 (04 Sep 2022 13:58)  T(F): 100.7 (04 Sep 2022 13:58), Max: 100.7 (04 Sep 2022 13:58)  HR: 73 (04 Sep 2022 17:36) (70 - 92)  BP: 134/71 (04 Sep 2022 17:36) (119/68 - 200/106)  BP(mean): --  RR: 16 (04 Sep 2022 17:36) (16 - 21)  SpO2: 100% (04 Sep 2022 17:36) (93% - 100%)    Parameters below as of 04 Sep 2022 17:36  Patient On (Oxygen Delivery Method): ventilator (04 Sep 2022 18:00)      24hr EVENTS: blood clots in mouth      ROS: [ x]  Unable to assess due to mental status   All other systems negative    -----------------------------------------------------------------------------------------------------------------------------------------------------------------------------------  ICU Vital Signs Last 24 Hrs  T(C): 37.5 (10 Sep 2022 07:42), Max: 38 (10 Sep 2022 04:00)  T(F): 99.5 (10 Sep 2022 07:42), Max: 100.4 (10 Sep 2022 04:00)  HR: 69 (10 Sep 2022 08:45) (61 - 80)  BP: 120/59 (10 Sep 2022 08:00) (113/58 - 140/90)  BP(mean): 78 (10 Sep 2022 08:00) (72 - 90)  ABP: --  ABP(mean): --  RR: 16 (10 Sep 2022 08:00) (16 - 23)  SpO2: 97% (10 Sep 2022 08:45) (96% - 100%)    O2 Parameters below as of 10 Sep 2022 08:00  Patient On (Oxygen Delivery Method): ventilator    O2 Concentration (%): 30        I&O's Summary    09 Sep 2022 07:01  -  10 Sep 2022 07:00  --------------------------------------------------------  IN: 1320 mL / OUT: 171 mL / NET: 1149 mL    10 Sep 2022 07:01  -  10 Sep 2022 08:56  --------------------------------------------------------  IN: 80 mL / OUT: 8 mL / NET: 72 mL        MEDICATIONS  (STANDING):  atorvastatin 40 milliGRAM(s) Oral at bedtime  chlorhexidine 0.12% Liquid 15 milliLiter(s) Oral Mucosa every 12 hours  chlorhexidine 2% Cloths 1 Application(s) Topical daily  dextrose 5%. 1000 milliLiter(s) (50 mL/Hr) IV Continuous <Continuous>  dextrose 5%. 1000 milliLiter(s) (100 mL/Hr) IV Continuous <Continuous>  dextrose 50% Injectable 25 Gram(s) IV Push once  dextrose 50% Injectable 12.5 Gram(s) IV Push once  dextrose 50% Injectable 25 Gram(s) IV Push once  glucagon  Injectable 1 milliGRAM(s) IntraMuscular once  heparin   Injectable 5000 Unit(s) SubCutaneous every 12 hours  insulin glargine Injectable (LANTUS) 13 Unit(s) SubCutaneous at bedtime  insulin lispro (ADMELOG) corrective regimen sliding scale   SubCutaneous every 6 hours  levothyroxine 75 MICROGram(s) Enteral Tube daily  metoprolol tartrate 25 milliGRAM(s) Oral two times a day  pantoprazole   Suspension 40 milliGRAM(s) Oral before breakfast  piperacillin/tazobactam IVPB.. 3.375 Gram(s) IV Intermittent every 12 hours      RESPIRATORY:  Mode: AC/ CMV (Assist Control/ Continuous Mandatory Ventilation)  RR (machine): 16  TV (machine): 400  FiO2: 30  PEEP: 5  MAP: 12  PIP: 39        IMAGING:   Recent imaging studies were reviewed.    LAB RESULTS:                          11.9   7.34  )-----------( 288      ( 10 Sep 2022 02:37 )             37.5           09-10    135  |  93<L>  |  50.7<H>  ----------------------------<  165<H>  5.1   |  27.0  |  4.73<H>    Ca    9.5      10 Sep 2022 02:37  Phos  4.4     09-10  Mg     2.5     09-10                  -----------------------------------------------------------------------------------------------------------------------------------------------------------------------------------    PHYSICAL EXAM:  General: Calm, intubated  HEENT: MMM  biteblock in place  Neuro:  -Mental status- No acute distress, no EO  no cough/gag/corneals  -CN- pupils NR irreg, EOMI, tongue midline, face symmetric  flaccid in all ext  *overbreathing vent occasionally    CV: RRR  Pulm: clear to auscultation  Abd: Soft, nontender, nondistended  Ext: no noted edema in lower ext  Skin: warm, dry    copious oral, minimal midline secretions

## 2022-09-10 NOTE — PROGRESS NOTE ADULT - SUBJECTIVE AND OBJECTIVE BOX
HPI: 62F w/ PMH HTN, HLD, ESRD on HD (M/W/F), OP, CHF (EF=40-45%), DM, pAfib not on AC sec to GI bleed, hypothyroid, anemia of chronic disease, cirrhosis (?VELA) w/ frequent ascitis, recent admit to PMN & tx w/ Zosyn. Pt was dropped off in ER w/ hypertensive crisis/ hyperglycemia ~600/ fever TMax: 100.7 w/ vomitine & decline in MS requiring intubation for airway protection. CT showed extensive IVH/Hydro.     IVH=3, NIHSS=33, mRs=unknown (scores on admission).        Interval history: Patient received one unit platelets for ASA use & DDAVP 28mg. Emergent EVD placed, CT post EVD done, cath in 3rd vent. CTA non occlusive thrombus R vert V4/vasilar, occluded L V1 segment. Patient required Insulin gtt for severe hypoglycemia, nicardipine gtt for BP control. a-line & midline for multiple drips. (unable to use Left UE sec to AVF)  Nephrology rec to d/c hypertonic saline & started on HD. Patient was able to get off insulin gtt & started on lantus & sliding scale, Endo consulted. Pt required paracentesis with neg for infection. Patient has been febrile with presumed aspiration pneumonia requiring tx w/ Zosyn, pancultures neg. Patient has been hypotensive during HD, all her BP meds d/jonatan except for metoprolol low dose.   Family made pt DNR after ongoing GOC discussions & poor prognosis with no meaningful recovery  Patient has been hemodynamically stable    Physical Exam:  Constitutional: Intubated, not on sedation    Neuro  * Mental Status:  Intubated  * Cranial Nerves: No EO, Pupils R oval, L round both ~4.5mm non-reactive, No corneals, weak gag, no cough  * Motor: no movement to pain in UE, minimal WD in LE  * Sensory: Sensation to noxious stimuli  * Reflexes: Not assessed  * Gait: Not assessed  * Wound: c/d/i, EVD     Vital Signs Last 24 Hrs  T(C): 36.8 (09 Sep 2022 20:17), Max: 39 (09 Sep 2022 04:13)  T(F): 98.3 (09 Sep 2022 20:17), Max: 102.2 (09 Sep 2022 04:13)  HR: 72 (10 Sep 2022 00:32) (60 - 86)  BP: 131/61 (09 Sep 2022 23:00) (112/50 - 140/90)  BP(mean): 82 (09 Sep 2022 23:00) (69 - 91)  RR: 23 (09 Sep 2022 23:00) (16 - 23)  SpO2: 96% (10 Sep 2022 00:32) (96% - 100%)    I&O's Summary    08 Sep 2022 07:01  -  09 Sep 2022 07:00  --------------------------------------------------------  IN: 1235 mL / OUT: 1743 mL / NET: -508 mL    09 Sep 2022 07:01  -  10 Sep 2022 01:08  --------------------------------------------------------  IN: 890 mL / OUT: 47 mL / NET: 843 mL    Labs:                      11.3   7.23  )-----------( 249      ( 09 Sep 2022 03:35 )             36.9     135  |  93<L>  |  35.2<H>  ----------------------------<  202<H>  5.0   |  28.0  |  3.71<H>    Ca    9.0      09 Sep 2022 03:35  Phos  4.1     09-09  Mg     2.3     09-09    TPro  7.3  /  Alb  2.4<L>  /  TBili  0.7  /  DBili  x   /  AST  21  /  ALT  7   /  AlkPhos  441<H>  09-08  LIVER FUNCTIONS - ( 08 Sep 2022 03:10 )  Alb: 2.4 g/dL / Pro: 7.3 g/dL / ALK PHOS: 441 U/L / ALT: 7 U/L / AST: 21 U/L / GGT: x           Neuro imaging  CT Head No Cont (09.05.22 @ 04:44) >  IMPRESSION:  A right frontal approach external ventricular drain terminates in third   ventricle.  Mild postprocedural pneumocephalus.    Severe intraventricular hemorrhage with casting the ventricular system   and moderate hydrocephalus is stable.    New mild subarachnoid hemorrhage within the cerebral hemispheres and   sylvian fissures bilaterally probably reflects recirculation of   intraventricular blood products, rather than new hemorrhage.    There is suspicion for underlying left thalamic parenchymal hemorrhage,   which is difficult to distinguish from the intraventricular hemorrhage in   the adjacent third ventricle. Follow-up MRI may be obtained for further   evaluation.

## 2022-09-10 NOTE — PROGRESS NOTE ADULT - SUBJECTIVE AND OBJECTIVE BOX
NEPHROLOGY INTERVAL HPI/OVERNIGHT EVENTS:  pt remains vented  no acute overnight events noted    MEDICATIONS  (STANDING):  atorvastatin 40 milliGRAM(s) Oral at bedtime  chlorhexidine 0.12% Liquid 15 milliLiter(s) Oral Mucosa every 12 hours  chlorhexidine 2% Cloths 1 Application(s) Topical daily  dextrose 5%. 1000 milliLiter(s) (50 mL/Hr) IV Continuous <Continuous>  dextrose 5%. 1000 milliLiter(s) (100 mL/Hr) IV Continuous <Continuous>  dextrose 50% Injectable 25 Gram(s) IV Push once  dextrose 50% Injectable 12.5 Gram(s) IV Push once  dextrose 50% Injectable 25 Gram(s) IV Push once  glucagon  Injectable 1 milliGRAM(s) IntraMuscular once  heparin   Injectable 5000 Unit(s) SubCutaneous every 12 hours  insulin glargine Injectable (LANTUS) 13 Unit(s) SubCutaneous at bedtime  insulin lispro (ADMELOG) corrective regimen sliding scale   SubCutaneous every 6 hours  levothyroxine 75 MICROGram(s) Enteral Tube daily  metoprolol tartrate 25 milliGRAM(s) Oral two times a day  pantoprazole   Suspension 40 milliGRAM(s) Oral before breakfast  piperacillin/tazobactam IVPB.. 3.375 Gram(s) IV Intermittent every 12 hours    MEDICATIONS  (PRN):  acetaminophen     Tablet .. 650 milliGRAM(s) Oral every 6 hours PRN Temp greater or equal to 38C (100.4F), Mild Pain (1 - 3)  dextrose Oral Gel 15 Gram(s) Oral once PRN Blood Glucose LESS THAN 70 milliGRAM(s)/deciliter  fentaNYL    Injectable 25 MICROGram(s) IV Push every 2 hours PRN Moderate Pain (4 - 6)  ondansetron Injectable 4 milliGRAM(s) IV Push every 6 hours PRN Nausea and/or Vomiting      Allergies    eggs (Anaphylaxis)  No Known Drug Allergies          Vital Signs Last 24 Hrs  T(C): 37.4 (10 Sep 2022 05:00), Max: 38 (10 Sep 2022 04:00)  T(F): 99.4 (10 Sep 2022 05:00), Max: 100.4 (10 Sep 2022 04:00)  HR: 69 (10 Sep 2022 07:00) (60 - 80)  BP: 128/59 (10 Sep 2022 07:00) (113/58 - 140/90)  BP(mean): 81 (10 Sep 2022 07:00) (72 - 90)  RR: 16 (10 Sep 2022 07:00) (16 - 23)  SpO2: 97% (10 Sep 2022 07:00) (96% - 100%)    Parameters below as of 10 Sep 2022 07:00  Patient On (Oxygen Delivery Method): ventilator    O2 Concentration (%): 30    PHYSICAL EXAM:  GENERAL: Vented  HEENT: Oral intubation  NECK: Supple, No JVD  NERVOUS SYSTEM: Unresponsive  CHEST:  Diminished BS anteriorly  HEART:  RRR, No rub  ABDOMEN: Soft, NT/distended mildly BS+  EXTREMITIES:  + dependent edema  SKIN: No rashes    LABS:                        11.9   7.34  )-----------( 288      ( 10 Sep 2022 02:37 )             37.5     09-10    135  |  93<L>  |  50.7<H>  ----------------------------<  165<H>  5.1   |  27.0  |  4.73<H>    Ca    9.5      10 Sep 2022 02:37  Phos  4.4     09-10  Mg     2.5     09-10          Magnesium, Serum: 2.5 mg/dL (09-10 @ 02:37)  Phosphorus Level, Serum: 4.4 mg/dL (09-10 @ 02:37)      RADIOLOGY & ADDITIONAL TESTS:

## 2022-09-10 NOTE — PROGRESS NOTE ADULT - ASSESSMENT
63 yo F with multiple co-morbidities, presented with large IVH/ obstructive hydrocephalus, concern for L BG ICH, score 3. S/p emergent EVD placement.  Nonocclusive thrombus in the proximal right basilar artery. Occluded left vertebral artery distal V4 segment.  H/o CAD on ASA; Afib not on AC due to GIB. Mixed CHF.  Acute resp failure. PNA.  Cirrhosis with moderate ascites. S/p paracentesis 9/5.  ESRD on HD.  Hyperglycemia, DM.    NEURO:   cont q4h neurochecks, off sedation, PRN fentanyl 25 for agitation/vent   maintain EVD @0, monitor outp and ICP  pain mgt: tylenol and oxy 5 prn  off sedation  Palliative consult- GOC discussion with the family members    Activity: [x] mobilize as tolerated [] Bedrest [x] PT [x] OT [] PMNR    PULM: vent support, adjust as needed, SBT as tolerated  SpO2>92%  OOB    CV:  hold hydralazine and norvasc for HD   metoprolol 25mg   -160  statin    RENAL: monitor I/O  HD 9/8, Renal involvement appreciated- removed 1.5L  anuric    GI:  TF- BM regimen, PPI for ppx  rectal tube- liquid stools  PPI  banatrol    ENDO:   Goal euglycemia (-180)  SSI for now, monitor FS  lantus 13units daily  synthroid    HEME/ONC:  VTE prophylaxis: [] SCDs [x] chemoprophylaxis heparin  SCDs, no AC in view of brain bleed    ID: afebrile   no leukocytosis  cont Zosyn 6 of 7 days  cultures NGTD    MISC:    I affirm that this patient is critically ill and at high risk for sudden, fatal deterioration due to one or more of the above stated active issues.     This time does not include bedside procedures that are documented separately.       61 yo F with multiple co-morbidities, presented with large IVH/ obstructive hydrocephalus, concern for L BG ICH, score 3. S/p emergent EVD placement.  Nonocclusive thrombus in the proximal right basilar artery. Occluded left vertebral artery distal V4 segment.  H/o CAD on ASA; Afib not on AC due to GIB. Mixed CHF.  Acute resp failure. PNA.  Cirrhosis with moderate ascites. S/p paracentesis 9/5.  ESRD on HD.  Hyperglycemia, DM.    NEURO:   cont q4h neurochecks, off sedation, PRN fentanyl 25 for agitation/vent   maintain EVD @0, monitor outp and ICP  pain mgt: tylenol and oxy 5 prn  off sedation  Palliative consult- GOC discussion with the family members    Activity: [x] mobilize as tolerated [] Bedrest [x] PT [x] OT [] PMNR    PULM: vent support, adjust as needed, SBT as tolerated  SpO2>92%  OOB  highPpeak likely due to clots    CV:  hold hydralazine and norvasc for HD   metoprolol 25mg   -160  statin    RENAL: monitor I/O  HD planned today, Renal involvement appreciated   anuric    GI:  TF- BM regimen, PPI for ppx  rectal tube- liquid stools  PPI  banatrol    ENDO:   Goal euglycemia (-180)  SSI for now, monitor FS  lantus 13units daily  synthroid    HEME/ONC:  VTE prophylaxis: [] SCDs [x] chemoprophylaxis heparin  SCDs, no AC in view of brain bleed    ID: afebrile   no leukocytosis  cont Zosyn 6 of 7 days  cultures NGTD    MISC:    I affirm that this patient is critically ill and at high risk for sudden, fatal deterioration due to one or more of the above stated active issues.     This time does not include bedside procedures that are documented separately.

## 2022-09-11 NOTE — PROGRESS NOTE ADULT - ASSESSMENT
62F with IVH / hydrocephalus     Plan   - q4 neuro checks   - EVD open at 0   - SBP    - Okay for diet   - SCDs and Heparin for dvt ppx   - prognosis poor   - Further plan pending am rounds with attending

## 2022-09-11 NOTE — PROGRESS NOTE ADULT - ASSESSMENT
61 yo F with multiple co-morbidities, presented with large IVH/ obstructive hydrocephalus, concern for L BG ICH, score 3. S/p emergent EVD placement.  Nonocclusive thrombus in the proximal right basilar artery. Occluded left vertebral artery distal V4 segment.  H/o CAD on ASA; Afib not on AC due to GIB. Mixed CHF.  Acute resp failure. PNA.  Cirrhosis with moderate ascites. S/p paracentesis 9/5.  ESRD on HD.  Hyperglycemia, DM.    NEURO:   cont q4h neurochecks, off sedation, PRN fentanyl 25 for agitation/vent   maintain EVD @0, monitor outp and ICP  pain mgt: tylenol and oxy 5 prn  off sedation  Palliative consult- GOC discussion with the family members    Activity: [x] mobilize as tolerated [] Bedrest [x] PT [x] OT [] PMNR    PULM: vent support, adjust as needed, SBT as tolerated  SpO2>92%  OOB  highPpeak likely due to clots    CV:  hold hydralazine and norvasc for HD   metoprolol 25mg   -160  statin    RENAL: monitor I/O  HD planned today, Renal involvement appreciated   anuric    GI:  TF- BM regimen, PPI for ppx  rectal tube- liquid stools  PPI  banatrol    ENDO:   Goal euglycemia (-180)  SSI for now, monitor FS  lantus 13units daily  synthroid    HEME/ONC:  VTE prophylaxis: [] SCDs [x] chemoprophylaxis heparin  SCDs, no AC in view of brain bleed    ID: afebrile   no leukocytosis  Zosyn completed  cultures NGTD    MISC:    I affirm that this patient is critically ill and at high risk for sudden, fatal deterioration due to one or more of the above stated active issues.     This time does not include bedside procedures that are documented separately.       61 yo F with multiple co-morbidities, presented with large IVH/ obstructive hydrocephalus, concern for L BG ICH, score 3. S/p emergent EVD placement.  Nonocclusive thrombus in the proximal right basilar artery. Occluded left vertebral artery distal V4 segment.  H/o CAD on ASA; Afib not on AC due to GIB. Mixed CHF.  Acute resp failure. PNA.  Cirrhosis with moderate ascites. S/p paracentesis 9/5.  ESRD on HD.  Hyperglycemia, DM.    NEURO:   cont q4h neurochecks, off sedation, PRN fentanyl 25 for agitation/vent   maintain EVD @0, monitor outp and ICP (118cc in 24hr)  pain mgt: tylenol and oxy 5 prn  Palliative consult- GOC discussion with the family members    Activity: [x] mobilize as tolerated [] Bedrest [x] PT [x] OT [] PMNR    PULM: vent support, adjust as needed, SBT as tolerated  SpO2>92%  OOB  highPpeak likely due to clots- lavage prn    CV:  metoprolol 25mg   -160  statin    RENAL: monitor I/O  HD 9/10 0.6L removed, Renal involvement appreciated   anuric    GI:  TF- BM regimen, PPI for ppx  rectal tube- liquid stools  PPI  banatrol    ENDO:   Goal euglycemia (-180)  SSI for now, monitor FS  lantus 13units daily  synthroid    HEME/ONC:  VTE prophylaxis: [] SCDs [x] chemoprophylaxis heparin  SCDs, no AC in view of brain bleed    ID: afebrile   no leukocytosis  Zosyn completed  cultures NGTD    MISC:    I affirm that this patient is critically ill and at high risk for sudden, fatal deterioration due to one or more of the above stated active issues.     This time does not include bedside procedures that are documented separately.

## 2022-09-11 NOTE — CHART NOTE - NSCHARTNOTEFT_GEN_A_CORE
I updated family and daughter Keshia at bedside. They understand that her brain injury is not recoverable. She currently has no brainstem reflexes except overbreathing the ventilator. I explained that she is not brain dead, but is nearly there. They mentioned that they are waiting for her heart to stop. I also mentioned that we do not leave the ETT in past 14 days so will need to decide the GOC by that point. I updated family and daughter Keshia at bedside via in person . They understand that her brain injury is not recoverable. She currently has no brainstem reflexes except overbreathing the ventilator. I explained that she is not brain dead, but is nearly there. They mentioned that they are waiting for her heart to stop. I also mentioned that we do not leave the ETT in past 14 days so will need to decide the GOC by that point.

## 2022-09-11 NOTE — PROGRESS NOTE ADULT - SUBJECTIVE AND OBJECTIVE BOX
HPI: 62F w/ PMH HTN, HLD, ESRD on HD (M/W/F), OP, CHF (EF=40-45%), DM, pAfib not on AC sec to GI bleed, hypothyroid, anemia of chronic disease, cirrhosis (?VELA) w/ frequent ascitis, recent admit to PMN & tx w/ Zosyn. Pt was dropped off in ER w/ hypertensive crisis/ hyperglycemia ~600/ fever TMax: 100.7 w/ vomitine & decline in MS requiring intubation for airway protection. CT showed extensive IVH/Hydro.     IVH=3, NIHSS=33, mRs=unknown (scores on admission).        Interval History: pt remains with dismal exam. Family undergoing discussion about withdrawal of care. Pt is now DNR     Vital Signs Last 24 Hrs  T(C): 36.6 (10 Sep 2022 20:15), Max: 38 (10 Sep 2022 04:00)  T(F): 97.8 (10 Sep 2022 20:15), Max: 100.4 (10 Sep 2022 04:00)  HR: 76 (11 Sep 2022 00:14) (67 - 81)  BP: 120/62 (11 Sep 2022 00:00) (91/57 - 158/67)  BP(mean): 79 (11 Sep 2022 00:00) (68 - 93)  RR: 15 (11 Sep 2022 00:00) (15 - 23)  SpO2: 98% (11 Sep 2022 00:14) (96% - 99%)    Parameters below as of 11 Sep 2022 00:00  Patient On (Oxygen Delivery Method): ventilator    O2 Concentration (%): 30    Physical Exam:  Constitutional: Intubated, not on sedation    Neuro  * Mental Status:  Comatose   * Cranial Nerves: No EO, Pupils R oval, L round both ~4.5mm non-reactive, No corneals, weak gag, no cough, + over breathes vent   * Motor: no motor   * Sensory: does not respond to sensation   * Reflexes: Not assessed  * Gait: Not assessed  * Wound: EVD dressing clean / intact, EVD patent

## 2022-09-11 NOTE — PROGRESS NOTE ADULT - SUBJECTIVE AND OBJECTIVE BOX
Chief complaint:   Patient is a 62y old  Female who presents with a chief complaint of severe IVH  + ASA 81m PTA (11 Sep 2022 00:28)    HPI:  Patient is a 62y old  Female who presents with a chief complaint of hypertensive crisis/ hyperglycemia ~600/ fever TMax: 100.7 w vomiting, who was declining clinically w lethargy since in ED, CTB was obtained and extensive B/L IVH casting 3rd and 4th vents were noted. pt has been intubated shortly after CT for poor exam/ agonal breaths/ GCS 4.  per d/w son, Feliciano, unable to reach Keshia, daughter/ HCP, full code and all measures to be done, R/B?A of EVD eplained alongside possibility of worsening hemorrhage and he understood and consent obtained via phone.    - LOC   - trauma reported   ?Headache/ unable to obtain from pt/ per ER RN pt did not endorse headache   + Nausea /+ Vomiting  pt was dropped off to ED by granddaughter     Mode: AC/ CMV (Assist Control/ Continuous Mandatory Ventilation)  RR (machine): 16  TV (machine): 0.4  FiO2: 50  PEEP: 5  PIP: 16    Vital Signs Last 24 Hrs  T(C): 38.2 (04 Sep 2022 13:58), Max: 38.2 (04 Sep 2022 13:58)  T(F): 100.7 (04 Sep 2022 13:58), Max: 100.7 (04 Sep 2022 13:58)  HR: 73 (04 Sep 2022 17:36) (70 - 92)  BP: 134/71 (04 Sep 2022 17:36) (119/68 - 200/106)  BP(mean): --  RR: 16 (04 Sep 2022 17:36) (16 - 21)  SpO2: 100% (04 Sep 2022 17:36) (93% - 100%)    Parameters below as of 04 Sep 2022 17:36  Patient On (Oxygen Delivery Method): ventilator (04 Sep 2022 18:00)        24hr EVENTS:      ROS: [ ]  Unable to assess due to mental status   All other systems negative    -----------------------------------------------------------------------------------------------------------------------------------------------------------------------------------  ICU Vital Signs Last 24 Hrs  T(C): 36.4 (11 Sep 2022 04:00), Max: 37.7 (10 Sep 2022 16:11)  T(F): 97.6 (11 Sep 2022 04:00), Max: 99.9 (10 Sep 2022 16:11)  HR: 74 (11 Sep 2022 08:00) (69 - 84)  BP: 133/64 (11 Sep 2022 08:00) (91/57 - 158/67)  BP(mean): 86 (11 Sep 2022 08:00) (68 - 93)  ABP: --  ABP(mean): --  RR: 16 (11 Sep 2022 08:00) (12 - 25)  SpO2: 98% (11 Sep 2022 08:00) (96% - 100%)    O2 Parameters below as of 11 Sep 2022 08:00  Patient On (Oxygen Delivery Method): ventilator    O2 Concentration (%): 30        I&O's Summary    10 Sep 2022 07:01  -  11 Sep 2022 07:00  --------------------------------------------------------  IN: 960 mL / OUT: 778 mL / NET: 182 mL    11 Sep 2022 07:01  -  11 Sep 2022 08:23  --------------------------------------------------------  IN: 130 mL / OUT: 5 mL / NET: 125 mL        MEDICATIONS  (STANDING):  atorvastatin 40 milliGRAM(s) Oral at bedtime  chlorhexidine 0.12% Liquid 15 milliLiter(s) Oral Mucosa every 12 hours  chlorhexidine 2% Cloths 1 Application(s) Topical daily  dextrose 5%. 1000 milliLiter(s) (50 mL/Hr) IV Continuous <Continuous>  dextrose 5%. 1000 milliLiter(s) (100 mL/Hr) IV Continuous <Continuous>  dextrose 50% Injectable 25 Gram(s) IV Push once  dextrose 50% Injectable 12.5 Gram(s) IV Push once  dextrose 50% Injectable 25 Gram(s) IV Push once  glucagon  Injectable 1 milliGRAM(s) IntraMuscular once  heparin   Injectable 5000 Unit(s) SubCutaneous every 12 hours  insulin glargine Injectable (LANTUS) 13 Unit(s) SubCutaneous at bedtime  insulin lispro (ADMELOG) corrective regimen sliding scale   SubCutaneous every 6 hours  levothyroxine 75 MICROGram(s) Enteral Tube daily  metoprolol tartrate 25 milliGRAM(s) Oral two times a day  pantoprazole   Suspension 40 milliGRAM(s) Oral before breakfast  piperacillin/tazobactam IVPB.. 3.375 Gram(s) IV Intermittent every 12 hours      RESPIRATORY:  Mode: AC/ CMV (Assist Control/ Continuous Mandatory Ventilation)  RR (machine): 12  TV (machine): 380  FiO2: 30  PEEP: 5  MAP: 12  PIP: 29        IMAGING:   Recent imaging studies were reviewed.    LAB RESULTS:                          11.8   6.37  )-----------( 250      ( 11 Sep 2022 03:30 )             37.9           09-11    136  |  95<L>  |  38.9<H>  ----------------------------<  146<H>  4.2   |  27.0  |  3.76<H>    Ca    9.4      11 Sep 2022 03:30  Phos  3.9     09-11  Mg     2.4     09-11                  -----------------------------------------------------------------------------------------------------------------------------------------------------------------------------------    PHYSICAL EXAM:  General: Calm, intubated  HEENT: MMM  Neuro:  -Mental status- No acute distress  -CN- PERRL 3mm, EOMI, tongue midline, face symmetric    CV: RRR  Pulm: clear to auscultation  Abd: Soft, nontender, nondistended  Ext: no noted edema in lower ext  Skin: warm, dry       Chief complaint:   Patient is a 62y old  Female who presents with a chief complaint of severe IVH  + ASA 81m PTA (11 Sep 2022 00:28)    HPI:  Patient is a 62y old  Female who presents with a chief complaint of hypertensive crisis/ hyperglycemia ~600/ fever TMax: 100.7 w vomiting, who was declining clinically w lethargy since in ED, CTB was obtained and extensive B/L IVH casting 3rd and 4th vents were noted. pt has been intubated shortly after CT for poor exam/ agonal breaths/ GCS 4.  per d/w son, Feliciano, unable to reach Keshia, daughter/ HCP, full code and all measures to be done, R/B?A of EVD eplained alongside possibility of worsening hemorrhage and he understood and consent obtained via phone.    - LOC   - trauma reported   ?Headache/ unable to obtain from pt/ per ER RN pt did not endorse headache   + Nausea /+ Vomiting  pt was dropped off to ED by granddaughter     24hr EVENTS:  HD yesterday- hypotensive so stopped early    ROS: [x ]  Unable to assess due to mental status   All other systems negative    -----------------------------------------------------------------------------------------------------------------------------------------------------------------------------------  ICU Vital Signs Last 24 Hrs  T(C): 36.4 (11 Sep 2022 04:00), Max: 37.7 (10 Sep 2022 16:11)  T(F): 97.6 (11 Sep 2022 04:00), Max: 99.9 (10 Sep 2022 16:11)  HR: 74 (11 Sep 2022 08:00) (69 - 84)  BP: 133/64 (11 Sep 2022 08:00) (91/57 - 158/67)  BP(mean): 86 (11 Sep 2022 08:00) (68 - 93)  ABP: --  ABP(mean): --  RR: 16 (11 Sep 2022 08:00) (12 - 25)  SpO2: 98% (11 Sep 2022 08:00) (96% - 100%)    O2 Parameters below as of 11 Sep 2022 08:00  Patient On (Oxygen Delivery Method): ventilator    O2 Concentration (%): 30        I&O's Summary    10 Sep 2022 07:01  -  11 Sep 2022 07:00  --------------------------------------------------------  IN: 960 mL / OUT: 778 mL / NET: 182 mL    11 Sep 2022 07:01  -  11 Sep 2022 08:23  --------------------------------------------------------  IN: 130 mL / OUT: 5 mL / NET: 125 mL        MEDICATIONS  (STANDING):  atorvastatin 40 milliGRAM(s) Oral at bedtime  chlorhexidine 0.12% Liquid 15 milliLiter(s) Oral Mucosa every 12 hours  chlorhexidine 2% Cloths 1 Application(s) Topical daily  dextrose 5%. 1000 milliLiter(s) (50 mL/Hr) IV Continuous <Continuous>  dextrose 5%. 1000 milliLiter(s) (100 mL/Hr) IV Continuous <Continuous>  dextrose 50% Injectable 25 Gram(s) IV Push once  dextrose 50% Injectable 12.5 Gram(s) IV Push once  dextrose 50% Injectable 25 Gram(s) IV Push once  glucagon  Injectable 1 milliGRAM(s) IntraMuscular once  heparin   Injectable 5000 Unit(s) SubCutaneous every 12 hours  insulin glargine Injectable (LANTUS) 13 Unit(s) SubCutaneous at bedtime  insulin lispro (ADMELOG) corrective regimen sliding scale   SubCutaneous every 6 hours  levothyroxine 75 MICROGram(s) Enteral Tube daily  metoprolol tartrate 25 milliGRAM(s) Oral two times a day  pantoprazole   Suspension 40 milliGRAM(s) Oral before breakfast  piperacillin/tazobactam IVPB.. 3.375 Gram(s) IV Intermittent every 12 hours      RESPIRATORY:  Mode: AC/ CMV (Assist Control/ Continuous Mandatory Ventilation)  RR (machine): 12  TV (machine): 380  FiO2: 30  PEEP: 5  MAP: 12  PIP: 29        IMAGING:   Recent imaging studies were reviewed.    LAB RESULTS:                          11.8   6.37  )-----------( 250      ( 11 Sep 2022 03:30 )             37.9       09-11    136  |  95<L>  |  38.9<H>  ----------------------------<  146<H>  4.2   |  27.0  |  3.76<H>    Ca    9.4      11 Sep 2022 03:30  Phos  3.9     09-11  Mg     2.4     09-11        -----------------------------------------------------------------------------------------------------------------------------------------------------------------------------------    PHYSICAL EXAM:  General: Calm, intubated  HEENT: MMM  biteblock in place  Neuro:  -Mental status- No acute distress, no EO  no cough/gag/corneals  -CN- pupils NR irreg, EOMI, tongue midline, face symmetric  flaccid in all ext  *overbreathing vent occasionally    CV: RRR  Pulm: clear to auscultation  Abd: Soft, nontender, nondistended  Ext: no noted edema in lower ext  Skin: warm, dry    copious oral, minimal midline secretions       Chief complaint:   Patient is a 62y old  Female who presents with a chief complaint of severe IVH  + ASA 81m PTA (11 Sep 2022 00:28)    HPI:  Patient is a 62y old  Female who presents with a chief complaint of hypertensive crisis/ hyperglycemia ~600/ fever TMax: 100.7 w vomiting, who was declining clinically w lethargy since in ED, CTB was obtained and extensive B/L IVH casting 3rd and 4th vents were noted. pt has been intubated shortly after CT for poor exam/ agonal breaths/ GCS 4.  per d/w son, Feliciano, unable to reach Keshai, daughter/ HCP, full code and all measures to be done, R/B?A of EVD eplained alongside possibility of worsening hemorrhage and he understood and consent obtained via phone.    - LOC   - trauma reported   ?Headache/ unable to obtain from pt/ per ER RN pt did not endorse headache   + Nausea /+ Vomiting  pt was dropped off to ED by granddaughter     24hr EVENTS:  HD yesterday- hypotensive so stopped early    ROS: [x ]  Unable to assess due to mental status   All other systems negative    -----------------------------------------------------------------------------------------------------------------------------------------------------------------------------------  ICU Vital Signs Last 24 Hrs  T(C): 36.4 (11 Sep 2022 04:00), Max: 37.7 (10 Sep 2022 16:11)  T(F): 97.6 (11 Sep 2022 04:00), Max: 99.9 (10 Sep 2022 16:11)  HR: 74 (11 Sep 2022 08:00) (69 - 84)  BP: 133/64 (11 Sep 2022 08:00) (91/57 - 158/67)  BP(mean): 86 (11 Sep 2022 08:00) (68 - 93)  ABP: --  ABP(mean): --  RR: 16 (11 Sep 2022 08:00) (12 - 25)  SpO2: 98% (11 Sep 2022 08:00) (96% - 100%)    O2 Parameters below as of 11 Sep 2022 08:00  Patient On (Oxygen Delivery Method): ventilator    O2 Concentration (%): 30        I&O's Summary    10 Sep 2022 07:01  -  11 Sep 2022 07:00  --------------------------------------------------------  IN: 960 mL / OUT: 778 mL / NET: 182 mL    11 Sep 2022 07:01  -  11 Sep 2022 08:23  --------------------------------------------------------  IN: 130 mL / OUT: 5 mL / NET: 125 mL        MEDICATIONS  (STANDING):  atorvastatin 40 milliGRAM(s) Oral at bedtime  chlorhexidine 0.12% Liquid 15 milliLiter(s) Oral Mucosa every 12 hours  chlorhexidine 2% Cloths 1 Application(s) Topical daily  dextrose 5%. 1000 milliLiter(s) (50 mL/Hr) IV Continuous <Continuous>  dextrose 5%. 1000 milliLiter(s) (100 mL/Hr) IV Continuous <Continuous>  dextrose 50% Injectable 25 Gram(s) IV Push once  dextrose 50% Injectable 12.5 Gram(s) IV Push once  dextrose 50% Injectable 25 Gram(s) IV Push once  glucagon  Injectable 1 milliGRAM(s) IntraMuscular once  heparin   Injectable 5000 Unit(s) SubCutaneous every 12 hours  insulin glargine Injectable (LANTUS) 13 Unit(s) SubCutaneous at bedtime  insulin lispro (ADMELOG) corrective regimen sliding scale   SubCutaneous every 6 hours  levothyroxine 75 MICROGram(s) Enteral Tube daily  metoprolol tartrate 25 milliGRAM(s) Oral two times a day  pantoprazole   Suspension 40 milliGRAM(s) Oral before breakfast  piperacillin/tazobactam IVPB.. 3.375 Gram(s) IV Intermittent every 12 hours      RESPIRATORY:  Mode: AC/ CMV (Assist Control/ Continuous Mandatory Ventilation)  RR (machine): 12  TV (machine): 380  FiO2: 30  PEEP: 5  MAP: 12  PIP: 29        IMAGING:   Recent imaging studies were reviewed.    LAB RESULTS:                          11.8   6.37  )-----------( 250      ( 11 Sep 2022 03:30 )             37.9       09-11    136  |  95<L>  |  38.9<H>  ----------------------------<  146<H>  4.2   |  27.0  |  3.76<H>    Ca    9.4      11 Sep 2022 03:30  Phos  3.9     09-11  Mg     2.4     09-11        -----------------------------------------------------------------------------------------------------------------------------------------------------------------------------------    PHYSICAL EXAM:  General: Calm, intubated  HEENT: MMM  biteblock in place  Neuro:  -Mental status- No acute distress, no EO  no cough/gag/corneals  -CN- pupils NR irreg, EOMI, tongue midline, face symmetric  flaccid in all ext  *overbreathing vent occasionally    CV: RRR  Pulm: coarse breath sounds to auscultation  Abd: Soft, nontender, nondistended  Ext: no noted edema in lower ext  Skin: warm, dry    copious oral, minimal midline secretions

## 2022-09-11 NOTE — CHART NOTE - NSCHARTNOTEFT_GEN_A_CORE
Source: Patient [ ]  Family [ ]   other [x ]    Current Diet: Diet, NPO with Tube Feed:   Tube Feeding Modality: Orogastric  Nepro (NEPRORTH)  Total Volume for 24 Hours (mL): 960  Continuous  Starting Tube Feed Rate {mL per Hour}: 10  Increase Tube Feed Rate by (mL): 10     Every 4 hours  Until Goal Tube Feed Rate (mL per Hour): 40  Tube Feed Duration (in Hours): 24  Tube Feed Start Time: 09:00  Banatrol TF     Qty per Day:  3 (09-08-22 @ 11:54)    Enteral /Parenteral Nutrition: Nepro at goal rate of 40 ml/hr (x20 hrs) to provide 800 ml, 1440 kcal, 65g protein, 582 ml free water, and 80% of RDIs for vitamins/minerals. Additional free water per MD discretion.     Current Weight:   (9/10) 138.6 lbs  (9/8) 139.5 lbs  (9/7) 137.7 lbs  (9/5) 138.4 lbs  (9/4) 149.9 lbs  Question accuracy wts, continue to monitor  3+ b/l arm/leg edema noted    Pertinent Medications: MEDICATIONS  (STANDING):  atorvastatin 40 milliGRAM(s) Oral at bedtime  chlorhexidine 0.12% Liquid 15 milliLiter(s) Oral Mucosa every 12 hours  chlorhexidine 2% Cloths 1 Application(s) Topical daily  dextrose 5%. 1000 milliLiter(s) (50 mL/Hr) IV Continuous <Continuous>  dextrose 5%. 1000 milliLiter(s) (100 mL/Hr) IV Continuous <Continuous>  dextrose 50% Injectable 25 Gram(s) IV Push once  dextrose 50% Injectable 12.5 Gram(s) IV Push once  dextrose 50% Injectable 25 Gram(s) IV Push once  glucagon  Injectable 1 milliGRAM(s) IntraMuscular once  heparin   Injectable 5000 Unit(s) SubCutaneous every 12 hours  insulin glargine Injectable (LANTUS) 13 Unit(s) SubCutaneous at bedtime  insulin lispro (ADMELOG) corrective regimen sliding scale   SubCutaneous every 6 hours  levothyroxine 75 MICROGram(s) Enteral Tube daily  metoprolol tartrate 25 milliGRAM(s) Oral two times a day  pantoprazole   Suspension 40 milliGRAM(s) Oral before breakfast  piperacillin/tazobactam IVPB.. 3.375 Gram(s) IV Intermittent every 12 hours    MEDICATIONS  (PRN):  acetaminophen     Tablet .. 650 milliGRAM(s) Oral every 6 hours PRN Temp greater or equal to 38C (100.4F), Mild Pain (1 - 3)  dextrose Oral Gel 15 Gram(s) Oral once PRN Blood Glucose LESS THAN 70 milliGRAM(s)/deciliter  fentaNYL    Injectable 25 MICROGram(s) IV Push every 2 hours PRN Moderate Pain (4 - 6)  ondansetron Injectable 4 milliGRAM(s) IV Push every 6 hours PRN Nausea and/or Vomiting    Pertinent Labs: CBC Full  -  ( 11 Sep 2022 03:30 )  WBC Count : 6.37 K/uL  RBC Count : 4.18 M/uL  Hemoglobin : 11.8 g/dL  Hematocrit : 37.9 %  Platelet Count - Automated : 250 K/uL  Mean Cell Volume : 90.7 fl  Mean Cell Hemoglobin : 28.2 pg  Mean Cell Hemoglobin Concentration : 31.1 gm/dL  Auto Neutrophil # : x  Auto Lymphocyte # : x  Auto Monocyte # : x  Auto Eosinophil # : x  Auto Basophil # : x  Auto Neutrophil % : x  Auto Lymphocyte % : x  Auto Monocyte % : x  Auto Eosinophil % : x  Auto Basophil % : x  09-11 Na136 mmol/L Glu 146 mg/dL<H> K+ 4.2 mmol/L Cr  3.76 mg/dL<H> BUN 38.9 mg/dL<H> Phos 3.9 mg/dL Alb n/a   PAB n/a       Skin: R head surgical incision    Nutrition focused physical exam deferred at this time - found signs of malnutrition [ ]absent [ ]present    Subcutaneous fat loss: [ ] Orbital fat pads region, [ ]Buccal fat region, [ ]Triceps region,  [ ]Ribs region    Muscle wasting: [ ]Temples region, [ ]Clavicle region, [ ]Shoulder region, [ ]Scapula region, [ ]Interosseous region,  [ ]thigh region, [ ]Calf region    Estimated Needs:   [x ] no change since previous assessment  [ ] recalculated:     Current Nutrition Diagnosis: Pt remains at high nutrition risk secondary to Increased Nutrient Needs related to increased physiological demand for nutrient as evidenced by ESRD on HD, IVH, hydrocephalus. Pt remains vented. Tube feed running at 40ml/hr during assessment. Last documented BM 9/10. Aware prognosis poor.     Recommendations:   1) Recommend increasing goal rate to 45 ml/hr (x20 hrs) to provide 900 ml, 1620 kcal, 73g protein, 654 ml free water, and 90% of RDIs for vitamins/minerals. Additional free water per MD discretion.   2) RX: nephro-mariann daily  3) Continue banatrol TID to bulk stool  4) Monitor wts and labs    Monitoring and Evaluation:   [x ] PO intake [ x] Tolerance to diet prescription [X] Weights  [X] Follow up per protocol [X] Labs:

## 2022-09-12 NOTE — PROGRESS NOTE ADULT - ASSESSMENT
62yr old  woman with Past MHX   ESRD on HD, OP, CHF, DM, PAF  hypertension, hyperlipidemia. anemia,  cirrhosis admitted with IVH with poor neurological exam    IVH  poor neurological status  neurochecks  plan per NSICU    Respiratory Failure  on vent - wean per NSICU      ESRD  HD per renal    Encounter for Palliative Care   62yr old  woman with Past MHX   ESRD on HD, OP, CHF, DM, PAF  hypertension, hyperlipidemia. anemia,  cirrhosis admitted with IVH with poor neurological exam    IVH  poor neurological status  neurochecks  plan per NSICU    Respiratory Failure  on vent - wean per NSICU    ESRD  HD per renal    Encounter for Palliative Care  See GOC note. Spoke to daughter Keshia, who informs me family continues to hope patient will " wake up".  Informed again of poor neurological outlook. Discussed likely need for trach/peg  Keshia needs to speak to the rest of the family .   Recommend family meeting - She will call her brothers, who work until 5pm.

## 2022-09-12 NOTE — PROGRESS NOTE ADULT - ASSESSMENT
Assessment  63yo F with extensive medical problems, p/w hemorrhagic stroke, IVH/hydrocephalus  brain compression requiring EVD placement  acute hypoxic respiratory failure, aspiration pneumonia   Severe hyperglycemia - resolved  hyponatremia resolved    Plan  - neuro checks q4hr  - EVD at 0, keep ICP <22  - keep nornothermic, tylenol prn  - keep BP , pt has been hypotensive, all po meds d/jonatan except low dose metoprolol  - cotn vent support  - tolerating TF Nupro  - nephrology following, pt is on HD   - cont Zosyn for aspiration pneumonia  - SCD s for DVt prophylaxis  - poor prognosis  - cont lantus & SS  - cont synthroid  - cont GOC discussion (pt was made DNR), Palliative is following  - Final plan pending morning rounds with attending

## 2022-09-12 NOTE — PROGRESS NOTE ADULT - SUBJECTIVE AND OBJECTIVE BOX
CC:  Follow up GOC , Symptoms    OVERNIGHT EVENTS:  weekend events noted    Present Symptoms:   Dyspnea:  No - intubated  Nausea/Vomiting:  No    Anxiety:  No    Depression: Unable  Fatigue:  Yes     Loss of appetite:    NA - NPO  Constipation: Not Reported      Pain:  No Signs            Character-            Duration-            Location-            Severity-    Pain AD Score:  [email or web address] (press ctrl + left click to view)    Review of Systems: Reviewed as above  Further ROS unable to  obtain due to poor mentation       MEDICATIONS  (STANDING):  artificial  tears Solution 1 Drop(s) Both EYES two times a day  atorvastatin 40 milliGRAM(s) Oral at bedtime  chlorhexidine 0.12% Liquid 15 milliLiter(s) Oral Mucosa every 12 hours  chlorhexidine 2% Cloths 1 Application(s) Topical daily  dextrose 5%. 1000 milliLiter(s) (50 mL/Hr) IV Continuous <Continuous>  dextrose 5%. 1000 milliLiter(s) (100 mL/Hr) IV Continuous <Continuous>  dextrose 50% Injectable 25 Gram(s) IV Push once  dextrose 50% Injectable 12.5 Gram(s) IV Push once  dextrose 50% Injectable 25 Gram(s) IV Push once  glucagon  Injectable 1 milliGRAM(s) IntraMuscular once  heparin   Injectable 5000 Unit(s) SubCutaneous every 12 hours  insulin glargine Injectable (LANTUS) 13 Unit(s) SubCutaneous at bedtime  insulin lispro (ADMELOG) corrective regimen sliding scale   SubCutaneous every 6 hours  levothyroxine 75 MICROGram(s) Enteral Tube daily  metoprolol tartrate 25 milliGRAM(s) Oral two times a day  pantoprazole   Suspension 40 milliGRAM(s) Oral before breakfast    MEDICATIONS  (PRN):  acetaminophen     Tablet .. 650 milliGRAM(s) Oral every 6 hours PRN Temp greater or equal to 38C (100.4F), Mild Pain (1 - 3)  dextrose Oral Gel 15 Gram(s) Oral once PRN Blood Glucose LESS THAN 70 milliGRAM(s)/deciliter  fentaNYL    Injectable 25 MICROGram(s) IV Push every 2 hours PRN Moderate Pain (4 - 6)  ondansetron Injectable 4 milliGRAM(s) IV Push every 6 hours PRN Nausea and/or Vomiting      PHYSICAL EXAM:    Vital Signs Last 24 Hrs  T(C): 36.9 (12 Sep 2022 08:00), Max: 38.4 (11 Sep 2022 23:43)  T(F): 98.5 (12 Sep 2022 08:00), Max: 101.2 (11 Sep 2022 23:43)  HR: 70 (12 Sep 2022 11:00) (68 - 88)  BP: 110/60 (12 Sep 2022 11:00) (98/60 - 137/63)  BP(mean): 73 (12 Sep 2022 11:00) (71 - 88)  RR: 12 (12 Sep 2022 11:00) (12 - 20)  SpO2: 99% (12 Sep 2022 11:00) (94% - 100%)    Parameters below as of 12 Sep 2022 11:00  Patient On (Oxygen Delivery Method): ventilator    O2 Concentration (%): 30    Karnofsky: 10 %  General:  F intubated non responsive to tactile stimuli       HEENT:  NCAT  + drain , + ET tube  Lungs: comfortable  non labored  CV:  RR  GI: soft NTND  : + rectal tube              MSK: weak bedbound  Skin:  warm/dry  Neuro  AOx0    LABS:                          11.5   7.50  )-----------( 272      ( 12 Sep 2022 04:20 )             36.6     09-12    135  |  93<L>  |  53.2<H>  ----------------------------<  104<H>  4.6   |  28.0  |  4.66<H>    Ca    9.6      12 Sep 2022 04:20  Phos  4.6     09-12  Mg     2.7     09-12          I&O's Summary    11 Sep 2022 07:01  -  12 Sep 2022 07:00  --------------------------------------------------------  IN: 1360 mL / OUT: 421 mL / NET: 939 mL    12 Sep 2022 07:01  -  12 Sep 2022 11:20  --------------------------------------------------------  IN: 170 mL / OUT: 15 mL / NET: 155 mL        RADIOLOGY & ADDITIONAL STUDIES:        ADVANCE DIRECTIVES/TREATMENT PREFERENCES:

## 2022-09-12 NOTE — HISTORY OF PRESENT ILLNESS
[FreeTextEntry1] : PRE CALL PRIOR TO APPOINTMENT: \par \par Diagnosis: Ascites \par \par COVID-19 SWAB: NEGATIVE \par \par RX on file: YES\par \par Referring MD: Fredy Mandujano \par \par Clotting or Bleeding disorders: patient denies \par \par PPM / Defibrillator: patient denies \par \par NPO status advised: yes\par \par History of fall: patient denies / walks with assistance \par \par Assistant device for walking: yes\par \par  home: yes\par \par Blood Thinners: patient denies \par \par Prescribing MD agree to have blood thinner medication held: N/A\par \par Capacity to make decisions: Yes\par \par HCP: yes daughter \par \par DNR: NO, patient denies \par \par Person contact for Pre-Call: DONE by NELLA\par \par --------------------------------------------------------------------------------------------------------\par \par \par \par Nidhi- Operative Assessment: (Day of Procedure)\par \par NPO status: yes\par \par Falls risk: yes, yellow bracelet on \par \par Labs: IN CHART - REVIEWED \par \par IR MD: Dr. Leno Mahoney \par \par Urine Pregnancy: N/A\par \par PRE-OP instructions: YES BY NELLA\par \par POST-OP teaching initiated: YES BY DG\par \par Allergy bracelet on: YES \par \par Antibiotic given: NO  No

## 2022-09-12 NOTE — PROGRESS NOTE ADULT - SUBJECTIVE AND OBJECTIVE BOX
Follow up: diabetes and hypothyroid  Interval Hx/Events: remains vented and unresponsive, vitals stable, on tube feeds    MEDICATIONS  (STANDING):  artificial  tears Solution 1 Drop(s) Both EYES two times a day  atorvastatin 40 milliGRAM(s) Oral at bedtime  chlorhexidine 0.12% Liquid 15 milliLiter(s) Oral Mucosa every 12 hours  chlorhexidine 2% Cloths 1 Application(s) Topical daily  dextrose 5%. 1000 milliLiter(s) (100 mL/Hr) IV Continuous <Continuous>  dextrose 5%. 1000 milliLiter(s) (50 mL/Hr) IV Continuous <Continuous>  dextrose 50% Injectable 25 Gram(s) IV Push once  dextrose 50% Injectable 12.5 Gram(s) IV Push once  dextrose 50% Injectable 25 Gram(s) IV Push once  glucagon  Injectable 1 milliGRAM(s) IntraMuscular once  heparin   Injectable 5000 Unit(s) SubCutaneous every 12 hours  insulin glargine Injectable (LANTUS) 13 Unit(s) SubCutaneous at bedtime  insulin lispro (ADMELOG) corrective regimen sliding scale   SubCutaneous every 6 hours  levothyroxine 75 MICROGram(s) Enteral Tube daily  metoprolol tartrate 25 milliGRAM(s) Oral two times a day  pantoprazole   Suspension 40 milliGRAM(s) Oral before breakfast    MEDICATIONS  (PRN):  acetaminophen     Tablet .. 650 milliGRAM(s) Oral every 6 hours PRN Temp greater or equal to 38C (100.4F), Mild Pain (1 - 3)  dextrose Oral Gel 15 Gram(s) Oral once PRN Blood Glucose LESS THAN 70 milliGRAM(s)/deciliter  fentaNYL    Injectable 25 MICROGram(s) IV Push every 2 hours PRN Moderate Pain (4 - 6)  ondansetron Injectable 4 milliGRAM(s) IV Push every 6 hours PRN Nausea and/or Vomiting    ALLERGIES: eggs (Anaphylaxis)  No Known Drug Allergies    REVIEW OF SYSTEMS: unable to assess due to mental status    Vital Signs Last 24 Hrs  T(C): 37.3 (12 Sep 2022 12:00), Max: 38.4 (11 Sep 2022 23:43)  T(F): 99.2 (12 Sep 2022 12:00), Max: 101.2 (11 Sep 2022 23:43)  HR: 73 (12 Sep 2022 14:00) (68 - 88)  BP: 107/65 (12 Sep 2022 14:00) (98/60 - 137/63)  BP(mean): 75 (12 Sep 2022 14:00) (71 - 88)  RR: 12 (12 Sep 2022 14:00) (12 - 20)  SpO2: 99% (12 Sep 2022 14:00) (94% - 100%)    Parameters below as of 12 Sep 2022 14:00  Patient On (Oxygen Delivery Method): ventilator  O2 Concentration (%): 30    Physical Exam: limited due to mental status  General appearance: intubated, not responsive  Lungs: Normal respiratory excursion. Lungs clear no w/r/r  CV: Normal S1S2, regular.  Abdomen: Soft    LABS:                        11.5   7.50  )-----------( 272      ( 12 Sep 2022 04:20 )             36.6     09-12    135  |  93<L>  |  53.2<H>  ----------------------------<  104<H>  4.6   |  28.0  |  4.66<H>    Ca    9.6      12 Sep 2022 04:20  Phos  4.6     09-12  Mg     2.7     09-12    A1C with Estimated Average Glucose Result: 10.6 % (09-05-22 @ 02:41)    CAPILLARY BLOOD GLUCOSE  POCT Blood Glucose.: 161 mg/dL (12 Sep 2022 11:52)  POCT Blood Glucose.: 104 mg/dL (12 Sep 2022 06:16)  POCT Blood Glucose.: 154 mg/dL (12 Sep 2022 00:05)  POCT Blood Glucose.: 189 mg/dL (11 Sep 2022 17:27)  POCT Blood Glucose.: 197 mg/dL (11 Sep 2022 10:58)  POCT Blood Glucose.: 145 mg/dL (11 Sep 2022 05:23)    Thyroid Stimulating Hormone, Serum: 11.36 uIU/mL [0.27 - 4.20] (09-05-22) T4, Serum: 7.7 ug/dL [4.5 - 12.0] (09-05-22)  Thyroid Stimulating Hormone, Serum: 14.44 uIU/mL [0.27 - 4.20] (09-05-22)

## 2022-09-12 NOTE — CHART NOTE - NSCHARTNOTEFT_GEN_A_CORE
Called by RN to the bedside for high ICP & no waveform on the monitor    PT has no change in neuro exam  No Waveform for ICP on the monitor, Rest of the hemodynamics stable.    EVD troubleshooting: + dark blood clots along the EVd tubing.   EVD flushed understerile technique, unable to draw back from the proximal portion of the drain.     Pt has poor neurological condition with no improvement since admission and no meaningful recovery  She will be a high risk for a new EVD based on her prognosis.   At this time risks outweigh the benefits for invasive procedure as EVD replacement.     D/W Dr Carl

## 2022-09-12 NOTE — PROGRESS NOTE ADULT - SUBJECTIVE AND OBJECTIVE BOX
Chief complaint:   Patient is a 62y old  Female who presents with a chief complaint of severe IVH  + ASA 81m PTA (11 Sep 2022 00:28)    HPI:  Patient is a 62y old  Female who presents with a chief complaint of hypertensive crisis/ hyperglycemia ~600/ fever TMax: 100.7 w vomiting, who was declining clinically w lethargy since in ED, CTB was obtained and extensive B/L IVH casting 3rd and 4th vents were noted. pt has been intubated shortly after CT for poor exam/ agonal breaths/ GCS 4.  per d/w son, Feliciano, unable to reach Keshia, daughter/ HCP, full code and all measures to be done, R/B?A of EVD eplained alongside possibility of worsening hemorrhage and he understood and consent obtained via phone.    - LOC   - trauma reported   ?Headache/ unable to obtain from pt/ per ER RN pt did not endorse headache   + Nausea /+ Vomiting  pt was dropped off to ED by granddaughter     24hr EVENTS: No events   Last Dialysis 9/10  S/P treatment PNA      ROS: [x ]  Unable to assess due to mental status   All other systems negative      ICU Vital Signs Last 24 Hrs  T(C): 37.3 (12 Sep 2022 12:00), Max: 38.4 (11 Sep 2022 23:43)  T(F): 99.2 (12 Sep 2022 12:00), Max: 101.2 (11 Sep 2022 23:43)  HR: 72 (12 Sep 2022 15:00) (68 - 88)  BP: 106/70 (12 Sep 2022 15:00) (98/60 - 137/63)  BP(mean): 81 (12 Sep 2022 15:00) (71 - 88)  RR: 12 (12 Sep 2022 15:00) (12 - 20)  SpO2: 99% (12 Sep 2022 15:00) (94% - 100%)      11 Sep 2022 07:01  -  12 Sep 2022 07:00  --------------------------------------------------------  IN:    Enteral Tube Flush: 150 mL    IV PiggyBack: 200 mL    Nepro: 960 mL    Oral Fluid: 50 mL  Total IN: 1360 mL    OUT:    External Ventricular Device (mL): 121 mL    Rectal Tube (mL): 300 mL    Voided (mL): 0 mL  Total OUT: 421 mL    Total NET: 939 mL      12 Sep 2022 07:01  -  12 Sep 2022 15:45  --------------------------------------------------------  IN:    IV PiggyBack: 100 mL    Nepro: 280 mL    Oral Fluid: 120 mL  Total IN: 500 mL    OUT:    External Ventricular Device (mL): 34 mL    Voided (mL): 0 mL  Total OUT: 34 mL    Total NET: 466 mL    MEDICATIONS  (STANDING):  artificial  tears Solution 1 Drop(s) Both EYES two times a day  atorvastatin 40 milliGRAM(s) Oral at bedtime  chlorhexidine 0.12% Liquid 15 milliLiter(s) Oral Mucosa every 12 hours  chlorhexidine 2% Cloths 1 Application(s) Topical daily  dextrose 5%. 1000 milliLiter(s) (50 mL/Hr) IV Continuous <Continuous>  dextrose 5%. 1000 milliLiter(s) (100 mL/Hr) IV Continuous <Continuous>  dextrose 50% Injectable 25 Gram(s) IV Push once  dextrose 50% Injectable 12.5 Gram(s) IV Push once  dextrose 50% Injectable 25 Gram(s) IV Push once  glucagon  Injectable 1 milliGRAM(s) IntraMuscular once  heparin   Injectable 5000 Unit(s) SubCutaneous every 12 hours  insulin glargine Injectable (LANTUS) 13 Unit(s) SubCutaneous at bedtime  insulin lispro (ADMELOG) corrective regimen sliding scale   SubCutaneous every 6 hours  levothyroxine 75 MICROGram(s) Enteral Tube daily  metoprolol tartrate 25 milliGRAM(s) Oral two times a day  pantoprazole   Suspension 40 milliGRAM(s) Oral before breakfast    MEDICATIONS  (PRN):  acetaminophen     Tablet .. 650 milliGRAM(s) Oral every 6 hours PRN Temp greater or equal to 38C (100.4F), Mild Pain (1 - 3)  dextrose Oral Gel 15 Gram(s) Oral once PRN Blood Glucose LESS THAN 70 milliGRAM(s)/deciliter  fentaNYL    Injectable 25 MICROGram(s) IV Push every 2 hours PRN Moderate Pain (4 - 6)  ondansetron Injectable 4 milliGRAM(s) IV Push every 6 hours PRN Nausea and/or Vomiting        RESPIRATORY:  Mode: AC/ CMV (Assist Control/ Continuous Mandatory Ventilation)  RR (machine): 12  TV (machine): 380  FiO2: 30  PEEP: 5  MAP: 12  PIP: 29        IMAGING:   Recent imaging studies were reviewed.    CT Head No Cont (09.05.22 @ 04:44) >  IMPRESSION:  A right frontal approach external ventricular drain terminates in third   ventricle.  Mild postprocedural pneumocephalus.    Severe intraventricular hemorrhage with casting the ventricular system   and moderate hydrocephalus is stable.    New mild subarachnoid hemorrhage within the cerebral hemispheres and   sylvian fissures bilaterally probably reflects recirculation of   intraventricular blood products, rather than new hemorrhage.    There is suspicion for underlying left thalamic parenchymal hemorrhage,   which is difficult to distinguish from the intraventricular hemorrhage in   the adjacent third ventricle. Follow-up MRI may be obtained for further      TTE Echo Complete w/o Contrast w/ Doppler (09.05.22 @ 09:02) >    Summary:   1. Normal left ventricular internal cavity size.   2. There is mild concentric left ventricular hypertrophy.   3. Normal global left ventricular systolic function.   4. Left ventricular ejection fraction, by visual estimation, is 55 to   60%.   5. Spectral Doppler shows pseudonormal pattern of left ventricular   myocardial filling (Grade II diastolic dysfunction).   6. Normal right ventricular size and function.   7. Mildly enlarged left atrium.   8. Mildly enlarged right atrium.   9. Mild mitral annular calcification.  10. Mild mitral valve regurgitation.  11. Mild thickening and calcification of the anterior and posterior   mitral valve leaflets.  12. Mild-moderate tricuspid regurgitation.  13. Normal trileaflet aortic valve with normal opening.  14. Small mobile echogenicity visualized on the aortic valve, as well as   questionable lambl's excrescence.  15. Estimated pulmonary artery systolic pressure is 37.6 mmHg assuming a   right atrial pressure of 8 mmHg, which is consistent with borderline  pulmonary hypertension.  16. Mildly dilated pulmonary artery.  17. Mobile intra-atrial septum.  18. Color flow doppler and intravenous injection of agitated saline   demonstrates the presence of an intact intra atrial septum.  19. There is no evidence of pericardial effusion.        LAB RESULTS:                          11.5   7.50  )-----------( 272      ( 12 Sep 2022 04:20 )             36.6                             11.8   6.37  )-----------( 250      ( 11 Sep 2022 03:30 )             37.9     CAPILLARY BLOOD GLUCOSE      POCT Blood Glucose.: 161 mg/dL (12 Sep 2022 11:52)  POCT Blood Glucose.: 104 mg/dL (12 Sep 2022 06:16)  POCT Blood Glucose.: 154 mg/dL (12 Sep 2022 00:05)  POCT Blood Glucose.: 189 mg/dL (11 Sep 2022 17:27)      09-12    135  |  93<L>  |  53.2<H>  ----------------------------<  104<H>  4.6   |  28.0  |  4.66<H>    Ca    9.6      12 Sep 2022 04:20  Phos  4.6     09-12  Mg     2.7     09-12 09-11    136  |  95<L>  |  38.9<H>  ----------------------------<  146<H>  4.2   |  27.0  |  3.76<H>    Ca    9.4      11 Sep 2022 03:30  Phos  3.9     09-11  Mg     2.4     09-11        -----------------------------------------------------------------------------------------------------------------------------------------------------------------------------------    PHYSICAL EXAM:  General: Calm, intubated  HEENT: MMM  biteblock in place  Neuro:  -Mental status- No acute distress, no EO  no cough/gag/corneals  -CN- pupils NR irreg, EOMI, tongue midline, face symmetric  flaccid in all ext  *overbreathing vent occasionally    CV: RRR  Pulm: coarse breath sounds to auscultation  Abd: Soft, nontender, nondistended  Ext: no noted edema in lower ext  Skin: warm, dry    copious oral, minimal midline secretions

## 2022-09-12 NOTE — PROGRESS NOTE ADULT - CONVERSATION/DISCUSSION
Diagnosis/Prognosis/Treatment Options

## 2022-09-12 NOTE — PROGRESS NOTE ADULT - ASSESSMENT
61 yo F with multiple co-morbidities, presented with large IVH/ obstructive hydrocephalus, concern for L BG ICH, score 3. S/p emergent EVD placement.  Nonocclusive thrombus in the proximal right basilar artery. Occluded left vertebral artery distal V4 segment.  H/o CAD on ASA; Afib not on AC due to GIB. Mixed CHF.  Acute resp failure. PNA.  Cirrhosis with moderate ascites. S/p paracentesis 9/5.  ESRD on HD.  Hyperglycemia, DM.    NEURO:   cont q4h neurochecks, off sedation, PRN fentanyl 25 for agitation/vent   maintain EVD @0, monitor outp and ICP (118cc in 24hr)  pain mgt: tylenol and oxy 5 prn  Palliative consult- GOC discussion with the family members    Activity: [x] mobilize as tolerated [] Bedrest [x] PT [x] OT [] PMNR    PULM: vent support, adjust as needed, SBT as tolerated  SpO2>92%  OOB  highPpeak likely due to clots- lavage prn    CV:  metoprolol 25mg   -160  statin    RENAL: monitor I/O  HD 9/10 0.6L removed, Renal involvement appreciated   anuric  Dialysis in  am     GI: Diarrhea  TF- BM regimen, PPI for ppx  rectal tube- liquid stools  PPI  banatrol      ENDO:   Goal euglycemia (-180)  SSI for now, monitor FS  lantus 13units daily  synthroid    HEME/ONC:  VTE prophylaxis: [] SCDs [x] chemoprophylaxis heparin  SCDs, no AC in view of brain bleed    ID: afebrile/ S/P PNA    no leukocytosis  Zosyn completed  cultures NGTD    MISC:    I affirm that this patient is critically ill and at high risk for sudden, fatal deterioration due to one or more of the above stated active issues.     This time does not include bedside procedures that are documented separately.       61 yo F with multiple co-morbidities, presented with large IVH/ obstructive hydrocephalus, concern for L BG ICH, score 3. S/p emergent EVD placement.  Nonocclusive thrombus in the proximal right basilar artery. Occluded left vertebral artery distal V4 segment.  H/o CAD on ASA; Afib not on AC due to GIB. Mixed CHF.  Acute resp failure. PNA.  Cirrhosis with moderate ascites. S/p paracentesis 9/5.  ESRD on HD.  Hyperglycemia, DM.    NEURO:   cont q4h neurochecks, off sedation, PRN fentanyl 25 for agitation/vent   maintain EVD @0, monitor outp and ICP (118cc in 24hr)  pain mgt: tylenol and oxy 5 prn  Palliative consult- GOC discussion with the family members    Activity:  [X] Bedrest     PULM: Resp failure secondary to neurologic injury;  vent support, adjust as needed, SBT as tolerated  SpO2>92%      CV:  metoprolol 25mg   -160  statin    RENAL: monitor I/O  HD 9/10 0.6L removed, Renal involvement appreciated   anuric  Dialysis in  am     GI: Diarrhea  TF- BM regimen, PPI for ppx  rectal tube- liquid stools  PPI  banatrol      ENDO:   Goal euglycemia (-180)  SSI for now, monitor FS  lantus 13units daily  synthroid    HEME/ONC:  VTE prophylaxis: [] SCDs [x] chemoprophylaxis heparin  SCDs, no AC in view of brain bleed    ID: afebrile/ S/P PNA    no leukocytosis  Zosyn completed  cultures NGTD    MISC:    I affirm that this patient is critically ill and at high risk for sudden, fatal deterioration due to one or more of the above stated active issues.     This time does not include bedside procedures that are documented separately.

## 2022-09-12 NOTE — PROGRESS NOTE ADULT - SUBJECTIVE AND OBJECTIVE BOX
NEPHROLOGY INTERVAL HPI/OVERNIGHT EVENTS:  pt remains vented and not responsive  hemodynamics stable    MEDICATIONS  (STANDING):  artificial  tears Solution 1 Drop(s) Both EYES two times a day  atorvastatin 40 milliGRAM(s) Oral at bedtime  chlorhexidine 0.12% Liquid 15 milliLiter(s) Oral Mucosa every 12 hours  chlorhexidine 2% Cloths 1 Application(s) Topical daily  dextrose 5%. 1000 milliLiter(s) (100 mL/Hr) IV Continuous <Continuous>  dextrose 5%. 1000 milliLiter(s) (50 mL/Hr) IV Continuous <Continuous>  dextrose 50% Injectable 25 Gram(s) IV Push once  dextrose 50% Injectable 12.5 Gram(s) IV Push once  dextrose 50% Injectable 25 Gram(s) IV Push once  glucagon  Injectable 1 milliGRAM(s) IntraMuscular once  heparin   Injectable 5000 Unit(s) SubCutaneous every 12 hours  insulin glargine Injectable (LANTUS) 13 Unit(s) SubCutaneous at bedtime  insulin lispro (ADMELOG) corrective regimen sliding scale   SubCutaneous every 6 hours  levothyroxine 75 MICROGram(s) Enteral Tube daily  metoprolol tartrate 25 milliGRAM(s) Oral two times a day  pantoprazole   Suspension 40 milliGRAM(s) Oral before breakfast    MEDICATIONS  (PRN):  acetaminophen     Tablet .. 650 milliGRAM(s) Oral every 6 hours PRN Temp greater or equal to 38C (100.4F), Mild Pain (1 - 3)  dextrose Oral Gel 15 Gram(s) Oral once PRN Blood Glucose LESS THAN 70 milliGRAM(s)/deciliter  fentaNYL    Injectable 25 MICROGram(s) IV Push every 2 hours PRN Moderate Pain (4 - 6)  ondansetron Injectable 4 milliGRAM(s) IV Push every 6 hours PRN Nausea and/or Vomiting      Allergies    eggs (Anaphylaxis)  No Known Drug Allergies          Vital Signs Last 24 Hrs  T(C): 37.3 (12 Sep 2022 12:00), Max: 38.4 (11 Sep 2022 23:43)  T(F): 99.2 (12 Sep 2022 12:00), Max: 101.2 (11 Sep 2022 23:43)  HR: 70 (12 Sep 2022 11:00) (68 - 88)  BP: 110/60 (12 Sep 2022 11:00) (98/60 - 137/63)  BP(mean): 73 (12 Sep 2022 11:00) (71 - 88)  RR: 12 (12 Sep 2022 11:00) (12 - 20)  SpO2: 99% (12 Sep 2022 11:00) (94% - 100%)      PHYSICAL EXAM:  GENERAL: Vented  HEENT: Oral intubation  NECK: Supple, No JVD  NERVOUS SYSTEM: Unresponsive  CHEST:  Diminished BS anteriorly  HEART:  RRR, No rub  ABDOMEN: Soft, NT/distended mildly BS+  EXTREMITIES:  + dependent edema  SKIN: No rashes    LABS:                        11.5   7.50  )-----------( 272      ( 12 Sep 2022 04:20 )             36.6     09-12    135  |  93<L>  |  53.2<H>  ----------------------------<  104<H>  4.6   |  28.0  |  4.66<H>    Ca    9.6      12 Sep 2022 04:20  Phos  4.6     09-12  Mg     2.7     09-12          Magnesium, Serum: 2.7 mg/dL (09-12 @ 04:20)  Phosphorus Level, Serum: 4.6 mg/dL (09-12 @ 04:20)      RADIOLOGY & ADDITIONAL TESTS:  < from: CT Head No Cont (09.05.22 @ 04:44) >    ACC: [id number] EXAM:  CT BRAIN                          PROCEDURE DATE:  09/05/2022          INTERPRETATION:  CLINICAL INFORMATION: Follow-up intraventricular   hemorrhage and hydrocephalus.    TECHNIQUE:  Axial CT images were acquired through the head.  Intravenous contrast: None  Two-dimensional reformats were generated.    COMPARISON STUDY: Noncontrast head CT from 09/04/2022 at four 1:00 PM.    CTA head/neck from 09/04/2022 at 8:21 PM.    FINDINGS:  A right frontal projection of ventricular drain coursing through the   frontal horn of the right lateral ventricle with its tip in the region of   the third ventricle is stable in position.  Mild postprocedural   pneumocephalus is stable.    Severe intraventricular hemorrhage with casting of both lateral   ventricles, the third ventricle, the cerebral aqueduct, and the fourth   ventricle is stable.  Moderate hydrocephalus is stable.    Intraventricular hemorrhage is again noted to be extending about the   fourth ventricle outflow tracts into the subarachnoid space in the   posterior fossa.  There is mild subarachnoid hemorrhage in both cerebral convexities and   both sylvian fissures, new from the prior study which probably represents   recirculation intraventricular blood products, rather than new hemorrhage.    There is suspicion for an underlying left thalamic parenchymal   hemorrhage, which is difficult to distinguish from the intraventricular   hemorrhage in the adjacent third ventricle.    There is no midline shift, transtentorial herniation or evidence for   acute territorial infarct.    The visualized paranasal sinuses and the mastoids are grossly clear.    The calvarium, skull base and orbits appear unremarkable.    IMPRESSION:  A right frontal approach external ventricular drain terminates in third   ventricle.  Mild postprocedural pneumocephalus.    Severe intraventricular hemorrhage with casting the ventricular system   and moderate hydrocephalus is stable.    New mild subarachnoid hemorrhage within the cerebral hemispheres and   sylvian fissures bilaterally probably reflects recirculation of   intraventricular blood products, rather than new hemorrhage.    There is suspicion for underlying left thalamic parenchymal hemorrhage,   which is difficult to distinguish from the intraventricular hemorrhage in   the adjacent third ventricle. Follow-up MRI may be obtained for further   evaluation.    < end of copied text >

## 2022-09-12 NOTE — PROGRESS NOTE ADULT - ASSESSMENT
ESRD: HTN with IVH  - cont supportive care as per neurosurgery  - HD tomorrow if hemodynamics acceptable  - trend labs

## 2022-09-12 NOTE — PROGRESS NOTE ADULT - SUBJECTIVE AND OBJECTIVE BOX
HPI: 62F w/ PMH HTN, HLD, ESRD on HD (M/W/F), OP, CHF (EF=40-45%), DM, pAfib not on AC sec to GI bleed, hypothyroid, anemia of chronic disease, cirrhosis (?VELA) w/ frequent ascitis, recent admit to PMN & tx w/ Zosyn. Pt was dropped off in ER w/ hypertensive crisis/ hyperglycemia ~600/ fever TMax: 100.7 w/ vomitine & decline in MS requiring intubation for airway protection. CT showed extensive IVH/Hydro.     IVH=3, NIHSS=33, mRs=unknown (scores on admission).        Interval history: Patient received one unit platelets for ASA use & DDAVP 28mg. Emergent EVD placed, CT post EVD done, cath in 3rd vent. CTA non occlusive thrombus R vert V4/vasilar, occluded L V1 segment. Patient required Insulin gtt for severe hypoglycemia, nicardipine gtt for BP control. a-line & midline for multiple drips. (unable to use Left UE sec to AVF)  Nephrology rec to d/c hypertonic saline & started on HD. Patient was able to get off insulin gtt & started on lantus & sliding scale, Endo consulted. Pt required paracentesis with neg for infection. Patient has been febrile with presumed aspiration pneumonia requiring tx w/ Zosyn, pancultures neg. Patient has been hypotensive during HD, all her BP meds d/jonatan except for metoprolol low dose.   Family made pt DNR after ongoing GOC discussions & poor prognosis with no meaningful recovery  Patient has been hemodynamically stable    Physical Exam:  Constitutional: Intubated, not on sedation    Neuro  * Mental Status:  Intubated  * Cranial Nerves: No EO, Pupils R oval, L round both ~4.5mm non-reactive, No corneals, weak gag, no cough  * Motor: no movement to pain in UE, minimal WD in LE  * Sensory: Sensation to noxious stimuli  * Reflexes: Not assessed  * Gait: Not assessed  * Wound: c/d/i, EVD     Vital Signs Last 24 Hrs  T(C): 38.4 (11 Sep 2022 23:43), Max: 38.4 (11 Sep 2022 23:43)  T(F): 101.2 (11 Sep 2022 23:43), Max: 101.2 (11 Sep 2022 23:43)  HR: 82 (12 Sep 2022 01:00) (71 - 88)  BP: 108/60 (12 Sep 2022 01:00) (105/57 - 143/64)  BP(mean): 74 (12 Sep 2022 01:00) (71 - 89)  RR: 18 (12 Sep 2022 01:00) (12 - 25)  SpO2: 97% (12 Sep 2022 01:00) (96% - 100%)    I&O's Summary    10 Sep 2022 07:01  -  11 Sep 2022 07:00  --------------------------------------------------------  IN: 960 mL / OUT: 778 mL / NET: 182 mL    11 Sep 2022 07:01  -  12 Sep 2022 01:31  --------------------------------------------------------  IN: 1110 mL / OUT: 142 mL / NET: 968 mL    Labs:                       11.8   6.37  )-----------( 250      ( 11 Sep 2022 03:30 )             37.9     136  |  95<L>  |  38.9<H>  ----------------------------<  146<H>  4.2   |  27.0  |  3.76<H>    Ca    9.4      11 Sep 2022 03:30  Phos  3.9     09-11  Mg     2.4     09-11    Neuro imaging  CT Head No Cont (09.05.22 @ 04:44) >  IMPRESSION:  A right frontal approach external ventricular drain terminates in third   ventricle.  Mild postprocedural pneumocephalus.    Severe intraventricular hemorrhage with casting the ventricular system   and moderate hydrocephalus is stable.    New mild subarachnoid hemorrhage within the cerebral hemispheres and   sylvian fissures bilaterally probably reflects recirculation of   intraventricular blood products, rather than new hemorrhage.    There is suspicion for underlying left thalamic parenchymal hemorrhage,   which is difficult to distinguish from the intraventricular hemorrhage in   the adjacent third ventricle. Follow-up MRI may be obtained for further   evaluation.

## 2022-09-12 NOTE — PROGRESS NOTE ADULT - ASSESSMENT
62F w/ PMH HTN, HLD, ESRD on HD (M/W/F), OP, CHF (EF=40-45%), DM, pAfib not on AC sec to GI bleed, hypothyroid, anemia of chronic disease, cirrhosis (?VELA) w/ frequent ascitis  p/w hemorrhagic stroke, IVH/hydrocephalus and brain compression requiring EVD placement.   1. T2DM complicated by ESRD - glucoses well controlled  - cont Lantus 13 units with Admelog scale q6 hours  - on tube feeds (carb control)    2. Hypothyroid - TSH 14 and Lt4 dose increased  - cont Levothyroxine 75 mcg daily, repeat TFTs 4 weeks    3. HLD   - cont statin    4. hemorrhagic stroke, IVH/hydrocephalus and brain compression requiring EVD placement, remains intubation  - poor prognosis, supportive care per Neuro ICU team

## 2022-09-13 NOTE — PROGRESS NOTE ADULT - SUBJECTIVE AND OBJECTIVE BOX
NEPHROLOGY INTERVAL HPI/OVERNIGHT EVENTS:  pt remains vented  unresponsive  hemodynamics ok    MEDICATIONS  (STANDING):  artificial  tears Solution 1 Drop(s) Both EYES two times a day  atorvastatin 40 milliGRAM(s) Oral at bedtime  chlorhexidine 0.12% Liquid 15 milliLiter(s) Oral Mucosa every 12 hours  chlorhexidine 2% Cloths 1 Application(s) Topical daily  dextrose 5%. 1000 milliLiter(s) (50 mL/Hr) IV Continuous <Continuous>  dextrose 5%. 1000 milliLiter(s) (100 mL/Hr) IV Continuous <Continuous>  dextrose 50% Injectable 25 Gram(s) IV Push once  dextrose 50% Injectable 12.5 Gram(s) IV Push once  dextrose 50% Injectable 25 Gram(s) IV Push once  glucagon  Injectable 1 milliGRAM(s) IntraMuscular once  heparin   Injectable 5000 Unit(s) SubCutaneous every 12 hours  insulin glargine Injectable (LANTUS) 13 Unit(s) SubCutaneous at bedtime  insulin lispro (ADMELOG) corrective regimen sliding scale   SubCutaneous every 6 hours  levothyroxine 75 MICROGram(s) Enteral Tube daily  metoprolol tartrate 25 milliGRAM(s) Oral two times a day  pantoprazole   Suspension 40 milliGRAM(s) Oral before breakfast    MEDICATIONS  (PRN):  acetaminophen     Tablet .. 650 milliGRAM(s) Oral every 6 hours PRN Temp greater or equal to 38C (100.4F), Mild Pain (1 - 3)  dextrose Oral Gel 15 Gram(s) Oral once PRN Blood Glucose LESS THAN 70 milliGRAM(s)/deciliter  fentaNYL    Injectable 25 MICROGram(s) IV Push every 2 hours PRN Moderate Pain (4 - 6)  ondansetron Injectable 4 milliGRAM(s) IV Push every 6 hours PRN Nausea and/or Vomiting      Allergies    eggs (Anaphylaxis)  No Known Drug Allergies          Vital Signs Last 24 Hrs  T(C): 36.4 (13 Sep 2022 11:38), Max: 37.5 (12 Sep 2022 16:00)  T(F): 97.6 (13 Sep 2022 11:38), Max: 99.5 (12 Sep 2022 16:00)  HR: 71 (13 Sep 2022 09:00) (70 - 80)  BP: 103/60 (13 Sep 2022 09:00) (103/60 - 134/72)  BP(mean): 73 (13 Sep 2022 09:00) (71 - 88)  RR: 21 (13 Sep 2022 09:00) (12 - 22)  SpO2: 99% (13 Sep 2022 09:00) (98% - 100%)    Parameters below as of 13 Sep 2022 08:00  Patient On (Oxygen Delivery Method): ventilator        PHYSICAL EXAM:  GENERAL: Vented  HEENT: Oral intubation  NECK: Supple, No JVD  NERVOUS SYSTEM: Unresponsive  CHEST:  Diminished BS anteriorly  HEART:  RRR, No rub  ABDOMEN: Soft, NT/distended mildly BS+  EXTREMITIES:  + dependent edema; mottled  SKIN: No rashes    LABS:                        11.5   7.50  )-----------( 272      ( 12 Sep 2022 04:20 )             36.6     09-12    135  |  93<L>  |  53.2<H>  ----------------------------<  104<H>  4.6   |  28.0  |  4.66<H>    Ca    9.6      12 Sep 2022 04:20  Phos  4.6     09-12  Mg     2.7     09-12              RADIOLOGY & ADDITIONAL TESTS:

## 2022-09-13 NOTE — PROGRESS NOTE ADULT - SUBJECTIVE AND OBJECTIVE BOX
HPI: 62F w/ PMH HTN, HLD, ESRD on HD (M/W/F), OP, CHF (EF=40-45%), DM, pAfib not on AC sec to GI bleed, hypothyroid, anemia of chronic disease, cirrhosis (?VELA) w/ frequent ascitis, recent admit to PMN & tx w/ Zosyn. Pt was dropped off in ER w/ hypertensive crisis/ hyperglycemia ~600/ fever TMax: 100.7 w/ vomitine & decline in MS requiring intubation for airway protection. CT showed extensive IVH/Hydro.     IVH=3, NIHSS=33, mRs=unknown (scores on admission).        Interval history: Patient received one unit platelets for ASA use & DDAVP 28mg. Emergent EVD placed, CT post EVD done, cath in 3rd vent. CTA non occlusive thrombus R vert V4/vasilar, occluded L V1 segment. Patient required Insulin gtt for severe hypoglycemia, nicardipine gtt for BP control. a-line & midline for multiple drips. (unable to use Left UE sec to AVF)  Nephrology rec to d/c hypertonic saline & started on HD. Patient was able to get off insulin gtt & started on lantus & sliding scale, Endo consulted. Pt required paracentesis with neg for infection. Patient has been febrile with presumed aspiration pneumonia requiring tx w/ Zosyn, pancultures neg. Patient has been hypotensive during HD, all her BP meds d/jonatan except for metoprolol low dose.   Family made pt DNR after ongoing GOC discussions & poor prognosis with no meaningful recovery  Patient has been hemodynamically stable  9/12 EVd stopped working with no wafevorm on the monitor. EVD trobleshot & flushed with no improvement. Attending contacted & risk outweigh benefits for new EVd placement. Pt has poor prognosis & palliative w/ NSICU team cont to have GOC discussions with the family.   GOC discussion meeting set up for this morning 9/13    Physical Exam:  Constitutional: Intubated, not on sedation    Neuro  * Mental Status:  Intubated  * Cranial Nerves: No EO, Pupils R oval, L round both ~4.5mm non-reactive, No corneals, weak gag, no cough  * Motor: no movement to pain in UE, minimal WD in LE  * Sensory: Sensation to noxious stimuli  * Reflexes: Not assessed  * Gait: Not assessed  * Wound: c/d/i, EVD     Vital Signs Last 24 Hrs  T(C): 37.1 (12 Sep 2022 23:32), Max: 37.9 (12 Sep 2022 02:00)  T(F): 98.8 (12 Sep 2022 23:32), Max: 100.2 (12 Sep 2022 02:00)  HR: 72 (13 Sep 2022 00:04) (68 - 82)  BP: 116/69 (13 Sep 2022 00:00) (98/60 - 134/72)  BP(mean): 83 (13 Sep 2022 00:00) (71 - 88)  RR: 22 (13 Sep 2022 00:00) (12 - 22)  SpO2: 100% (13 Sep 2022 00:04) (94% - 100%)    I&O's Summary    11 Sep 2022 07:01  -  12 Sep 2022 07:00  --------------------------------------------------------  IN: 1360 mL / OUT: 421 mL / NET: 939 mL    12 Sep 2022 07:01  -  13 Sep 2022 01:14  --------------------------------------------------------  IN: 990 mL / OUT: 79 mL / NET: 911 mL    Labs:                         11.5   7.50  )-----------( 272      ( 12 Sep 2022 04:20 )             36.6     135  |  93<L>  |  53.2<H>  ----------------------------<  104<H>  4.6   |  28.0  |  4.66<H>    Ca    9.6      12 Sep 2022 04:20  Phos  4.6     09-12  Mg     2.7     09-12    Neuro imaging  CT Head No Cont (09.05.22 @ 04:44) >  IMPRESSION:  A right frontal approach external ventricular drain terminates in third   ventricle.  Mild postprocedural pneumocephalus.    Severe intraventricular hemorrhage with casting the ventricular system   and moderate hydrocephalus is stable.    New mild subarachnoid hemorrhage within the cerebral hemispheres and   sylvian fissures bilaterally probably reflects recirculation of   intraventricular blood products, rather than new hemorrhage.    There is suspicion for underlying left thalamic parenchymal hemorrhage,   which is difficult to distinguish from the intraventricular hemorrhage in   the adjacent third ventricle. Follow-up MRI may be obtained for further   evaluation.

## 2022-09-13 NOTE — PROGRESS NOTE ADULT - ASSESSMENT
62yr old  woman with Past MHX   ESRD on HD, OP, CHF, DM, PAF  hypertension, hyperlipidemia. anemia,  cirrhosis admitted with IVH with poor neurological exam    IVH  poor neurological status  neurochecks  plan per NSICU    Respiratory Failure  on vent - wean per NSICU    ESRD  HD per renal  avoid nephrotoxic agents    Encounter for Palliative Care  Called daughter with aid of  [phone]. She and her family agree to a meeting tomorrow at 5pm

## 2022-09-13 NOTE — PROGRESS NOTE ADULT - ASSESSMENT
61 yo F with multiple co-morbidities, presented with large IVH/ obstructive hydrocephalus, concern for L BG ICH, score 3. S/p emergent EVD placement.  Nonocclusive thrombus in the proximal right basilar artery. Occluded left vertebral artery distal V4 segment.  H/o CAD on ASA; Afib not on AC due to GIB. Mixed CHF.  Acute resp failure. PNA.  Cirrhosis with moderate ascites. S/p paracentesis 9/5.  ESRD on HD.  Hyperglycemia, DM.    NEURO:   cont q4h neurochecks, off sedation, PRN fentanyl 25 for agitation/vent   maintain EVD @0, monitor outp and ICP (118cc in 24hr)  family meeting today   pain mgt: tylenol and oxy 5 prn  Palliative consult- GOC discussion with the family members    Activity: [x] mobilize as tolerated [] Bedrest [x] PT [x] OT [] PMNR    PULM: vent support, adjust as needed, SBT as tolerated  SpO2>92%  OOB  highPpeak likely due to clots- lavage prn    CV:  metoprolol 25mg   -160  statin    RENAL: monitor I/O  HD 9/10 0.6L removed, Renal involvement appreciated   anuric  Dialysis   today    GI: Diarrhea  TF- BM regimen, PPI for ppx  rectal tube- liquid stools  PPI  banatrol      ENDO:   Goal euglycemia (-180)  SSI for now, monitor FS  lantus 13units daily  synthroid    HEME/ONC:  VTE prophylaxis: [] SCDs [x] chemoprophylaxis heparin  SCDs, no AC in view of brain bleed    ID: afebrile/ S/P PNA    no leukocytosis  Zosyn completed  cultures NGTD    MISC:    I affirm that this patient is critically ill and at high risk for sudden, fatal deterioration due to one or more of the above stated active issues.     This time does not include bedside procedures that are documented separately.       61 yo F with multiple co-morbidities, presented with large IVH/ obstructive hydrocephalus, concern for L BG ICH, score 3. S/p emergent EVD placement.  Nonocclusive thrombus in the proximal right basilar artery. Occluded left vertebral artery distal V4 segment.  H/o CAD on ASA; Afib not on AC due to GIB. Mixed CHF.  Acute resp failure. PNA.  Cirrhosis with moderate ascites. S/p paracentesis 9/5.  ESRD on HD.  Hyperglycemia, DM.    NEURO:   cont q4h neurochecks, off sedation, PRN fentanyl 25 for agitation/vent   maintain EVD @0, monitor outp and ICP (118cc in 24hr)  family meeting today   pain mgt: tylenol and oxy 5 prn  Palliative consult- GOC discussion with the family members    Activity:  [X] Bedrest     PULM: Resp failure secondary to neurologic injury ; vent support, adjust as needed,   SpO2>92%    CV:  metoprolol 25mg   -160  statin    RENAL: monitor I/O  HD 9/10 0.6L removed, Renal involvement appreciated   anuric  Dialysis   today    GI: Diarrhea  TF- BM regimen, PPI for ppx  rectal tube- liquid stools  PPI  banatrol      ENDO:   Goal euglycemia (-180)  SSI for now, monitor FS  lantus 13units daily  synthroid    HEME/ONC:  VTE prophylaxis: [] SCDs [x] chemoprophylaxis heparin  SCDs, no AC in view of brain bleed    ID: afebrile/ S/P PNA    no leukocytosis  Zosyn completed  cultures NGTD    MISC:    I affirm that this patient is critically ill and at high risk for sudden, fatal deterioration due to one or more of the above stated active issues.     This time does not include bedside procedures that are documented separately.

## 2022-09-13 NOTE — PROGRESS NOTE ADULT - ASSESSMENT
Assessment  61yo F with extensive medical problems, p/w hemorrhagic stroke, IVH/hydrocephalus  brain compression requiring EVD placement  acute hypoxic respiratory failure, aspiration pneumonia   Severe hyperglycemia - resolved  hyponatremia resolved    Plan  - neuro checks q4hr  - EVD at 0, keep ICP <22. EVD is not working,   - keep nornothermic, tylenol prn  - keep BP , pt has been hypotensive, all po meds d/jonatan except low dose metoprolol  - cont vent support  - tolerating TF Nupro  - nephrology following, pt is on HD   - completed course of Zosyn for aspiration pneumonia  - SCD s for DVt prophylaxis  - poor prognosis  - cont lantus & SS  - cont synthroid  - cont GOC discussion (pt was made DNR), Palliative is following  - GOC discussion with family set up for today 9/13  - Final plan pending morning rounds with attending

## 2022-09-13 NOTE — PROGRESS NOTE ADULT - SUBJECTIVE AND OBJECTIVE BOX
Chief complaint:   Patient is a 62y old  Female who presents with a chief complaint of severe IVH  + ASA 81m PTA (11 Sep 2022 00:28)    HPI:  Patient is a 62y old  Female who presents with a chief complaint of hypertensive crisis/ hyperglycemia ~600/ fever TMax: 100.7 w vomiting, who was declining clinically w lethargy since in ED, CTB was obtained and extensive B/L IVH casting 3rd and 4th vents were noted. pt has been intubated shortly after CT for poor exam/ agonal breaths/ GCS 4.  per d/w son, Feliciano, unable to reach [facility], daughter/ HCP, full code and all measures to be done, R/B?A of EVD eplained alongside possibility of worsening hemorrhage and he understood and consent obtained via phone.    - LOC   - trauma reported   ?Headache/ unable to obtain from pt/ per ER RN pt did not endorse headache   + Nausea /+ Vomiting  pt was dropped off to ED by granddaughter     24hr EVENTS: No events   Last Dialysis 9/10  S/P treatment PNA      ROS: [x ]  Unable to assess due to mental status   All other systems negative    ICU Vital Signs Last 24 Hrs  T(C): 36.3 (13 Sep 2022 07:59), Max: 37.5 (12 Sep 2022 16:00)  T(F): 97.3 (13 Sep 2022 07:59), Max: 99.5 (12 Sep 2022 16:00)  HR: 71 (13 Sep 2022 09:00) (70 - 80)  BP: 103/60 (13 Sep 2022 09:00) (103/60 - 134/72)  BP(mean): 73 (13 Sep 2022 09:00) (71 - 88)  RR: 21 (13 Sep 2022 09:00) (12 - 22)  SpO2: 99% (13 Sep 2022 09:00) (98% - 100%)    O2 Parameters below as of 13 Sep 2022 08:00  Patient On (Oxygen Delivery Method): ventilator        12 Sep 2022 07:01  -  13 Sep 2022 07:00  --------------------------------------------------------  IN:    IV PiggyBack: 100 mL    Nepro: 960 mL    Oral Fluid: 220 mL  Total IN: 1280 mL    OUT:    External Ventricular Device (mL): 117 mL    Rectal Tube (mL): 200 mL    Voided (mL): 0 mL  Total OUT: 317 mL    Total NET: 963 mL              11 Sep 2022 07:01  -  12 Sep 2022 07:00  --------------------------------------------------------  IN:    Enteral Tube Flush: 150 mL    IV PiggyBack: 200 mL    Nepro: 960 mL    Oral Fluid: 50 mL  Total IN: 1360 mL    OUT:    External Ventricular Device (mL): 121 mL    Rectal Tube (mL): 300 mL    Voided (mL): 0 mL  Total OUT: 421 mL    Total NET: 939 mL      12 Sep 2022 07:01  -  12 Sep 2022 15:45  --------------------------------------------------------  IN:    IV PiggyBack: 100 mL    Nepro: 280 mL    Oral Fluid: 120 mL  Total IN: 500 mL    OUT:    External Ventricular Device (mL): 34 mL    Voided (mL): 0 mL  Total OUT: 34 mL    Total NET: 466 mL    MEDICATIONS  (STANDING):  artificial  tears Solution 1 Drop(s) Both EYES two times a day  atorvastatin 40 milliGRAM(s) Oral at bedtime  chlorhexidine 0.12% Liquid 15 milliLiter(s) Oral Mucosa every 12 hours  chlorhexidine 2% Cloths 1 Application(s) Topical daily  dextrose 5%. 1000 milliLiter(s) (50 mL/Hr) IV Continuous <Continuous>  dextrose 5%. 1000 milliLiter(s) (100 mL/Hr) IV Continuous <Continuous>  dextrose 50% Injectable 25 Gram(s) IV Push once  dextrose 50% Injectable 12.5 Gram(s) IV Push once  dextrose 50% Injectable 25 Gram(s) IV Push once  glucagon  Injectable 1 milliGRAM(s) IntraMuscular once  heparin   Injectable 5000 Unit(s) SubCutaneous every 12 hours  insulin glargine Injectable (LANTUS) 13 Unit(s) SubCutaneous at bedtime  insulin lispro (ADMELOG) corrective regimen sliding scale   SubCutaneous every 6 hours  levothyroxine 75 MICROGram(s) Enteral Tube daily  metoprolol tartrate 25 milliGRAM(s) Oral two times a day  pantoprazole   Suspension 40 milliGRAM(s) Oral before breakfast    MEDICATIONS  (PRN):  acetaminophen     Tablet .. 650 milliGRAM(s) Oral every 6 hours PRN Temp greater or equal to 38C (100.4F), Mild Pain (1 - 3)  dextrose Oral Gel 15 Gram(s) Oral once PRN Blood Glucose LESS THAN 70 milliGRAM(s)/deciliter  fentaNYL    Injectable 25 MICROGram(s) IV Push every 2 hours PRN Moderate Pain (4 - 6)  ondansetron Injectable 4 milliGRAM(s) IV Push every 6 hours PRN Nausea and/or Vomiting        RESPIRATORY:  Mode: AC/ CMV (Assist Control/ Continuous Mandatory Ventilation)  RR (machine): 12  TV (machine): 380  FiO2: 30  PEEP: 5  MAP: 12  PIP: 29        IMAGING:   Recent imaging studies were reviewed.    CT Head No Cont (09.05.22 @ 04:44) >  IMPRESSION:  A right frontal approach external ventricular drain terminates in third   ventricle.  Mild postprocedural pneumocephalus.    Severe intraventricular hemorrhage with casting the ventricular system   and moderate hydrocephalus is stable.    New mild subarachnoid hemorrhage within the cerebral hemispheres and   sylvian fissures bilaterally probably reflects recirculation of   intraventricular blood products, rather than new hemorrhage.    There is suspicion for underlying left thalamic parenchymal hemorrhage,   which is difficult to distinguish from the intraventricular hemorrhage in   the adjacent third ventricle. Follow-up MRI may be obtained for further      TTE Echo Complete w/o Contrast w/ Doppler (09.05.22 @ 09:02) >    Summary:   1. Normal left ventricular internal cavity size.   2. There is mild concentric left ventricular hypertrophy.   3. Normal global left ventricular systolic function.   4. Left ventricular ejection fraction, by visual estimation, is 55 to   60%.   5. Spectral Doppler shows pseudonormal pattern of left ventricular   myocardial filling (Grade II diastolic dysfunction).   6. Normal right ventricular size and function.   7. Mildly enlarged left atrium.   8. Mildly enlarged right atrium.   9. Mild mitral annular calcification.  10. Mild mitral valve regurgitation.  11. Mild thickening and calcification of the anterior and posterior   mitral valve leaflets.  12. Mild-moderate tricuspid regurgitation.  13. Normal trileaflet aortic valve with normal opening.  14. Small mobile echogenicity visualized on the aortic valve, as well as   questionable lambl's excrescence.  15. Estimated pulmonary artery systolic pressure is 37.6 mmHg assuming a   right atrial pressure of 8 mmHg, which is consistent with borderline  pulmonary hypertension.  16. Mildly dilated pulmonary artery.  17. Mobile intra-atrial septum.  18. Color flow doppler and intravenous injection of agitated saline   demonstrates the presence of an intact intra atrial septum.  19. There is no evidence of pericardial effusion.        LAB RESULTS:                          11.5   7.50  )-----------( 272      ( 12 Sep 2022 04:20 )             36.6                             11.8   6.37  )-----------( 250      ( 11 Sep 2022 03:30 )             37.9     CAPILLARY BLOOD GLUCOSE  POCT Blood Glucose.: 136 mg/dL (13 Sep 2022 05:58)  POCT Blood Glucose.: 185 mg/dL (13 Sep 2022 00:33)  POCT Blood Glucose.: 135 mg/dL (12 Sep 2022 17:24)  POCT Blood Glucose.: 161 mg/dL (12 Sep 2022 11:52)        09-12    135  |  93<L>  |  53.2<H>  ----------------------------<  104<H>  4.6   |  28.0  |  4.66<H>    Ca    9.6      12 Sep 2022 04:20  Phos  4.6     09-12  Mg     2.7     09-12 09-11    136  |  95<L>  |  38.9<H>  ----------------------------<  146<H>  4.2   |  27.0  |  3.76<H>    Ca    9.4      11 Sep 2022 03:30  Phos  3.9     09-11  Mg     2.4     09-11        PHYSICAL EXAM:  General: Calm, intubated  HEENT: pupils 3mm and non reactive   biteblock in place  Neuro:  -Mental status- No grimacing to noxious , no dolls, no gaga, no cough, no corneals , breaths over vent settingf  flaccid in all ext    CV: RRR  Pulm: coarse breath sounds to auscultation  Abd: Soft, nontender, nondistended  Ext: no noted edema in lower ext  Skin: warm, dry    copious oral, minimal midline secretions

## 2022-09-13 NOTE — PROGRESS NOTE ADULT - SUBJECTIVE AND OBJECTIVE BOX
CC:  Follow up GOC , Symptoms    OVERNIGHT EVENTS:      Present Symptoms:   Dyspnea:  No  Mild  Mod   Severe     Nausea/Vomiting:  No  Yes  Anxiety:  No   Yes    Depression:  No  Yes  Unable  Fatigue:  Yes   No   Unable  Loss of appetite:  No Yes   NA   Constipation: Not Reported   Yes    Pain: No   No Signs            Character-            Duration-            Location-            Severity-    Pain AD Score:  [email or web address] (press ctrl + left click to view)    Review of Systems: Reviewed as above  Further ROS unable to  obtain due to poor mentation   All others negative    MEDICATIONS  (STANDING):  artificial  tears Solution 1 Drop(s) Both EYES two times a day  atorvastatin 40 milliGRAM(s) Oral at bedtime  chlorhexidine 0.12% Liquid 15 milliLiter(s) Oral Mucosa every 12 hours  chlorhexidine 2% Cloths 1 Application(s) Topical daily  dextrose 5%. 1000 milliLiter(s) (50 mL/Hr) IV Continuous <Continuous>  dextrose 5%. 1000 milliLiter(s) (100 mL/Hr) IV Continuous <Continuous>  dextrose 50% Injectable 25 Gram(s) IV Push once  dextrose 50% Injectable 12.5 Gram(s) IV Push once  dextrose 50% Injectable 25 Gram(s) IV Push once  glucagon  Injectable 1 milliGRAM(s) IntraMuscular once  heparin   Injectable 5000 Unit(s) SubCutaneous every 12 hours  insulin glargine Injectable (LANTUS) 13 Unit(s) SubCutaneous at bedtime  insulin lispro (ADMELOG) corrective regimen sliding scale   SubCutaneous every 6 hours  levothyroxine 75 MICROGram(s) Enteral Tube daily  metoprolol tartrate 25 milliGRAM(s) Oral two times a day  pantoprazole   Suspension 40 milliGRAM(s) Oral before breakfast    MEDICATIONS  (PRN):  acetaminophen     Tablet .. 650 milliGRAM(s) Oral every 6 hours PRN Temp greater or equal to 38C (100.4F), Mild Pain (1 - 3)  dextrose Oral Gel 15 Gram(s) Oral once PRN Blood Glucose LESS THAN 70 milliGRAM(s)/deciliter  fentaNYL    Injectable 25 MICROGram(s) IV Push every 2 hours PRN Moderate Pain (4 - 6)  ondansetron Injectable 4 milliGRAM(s) IV Push every 6 hours PRN Nausea and/or Vomiting      PHYSICAL EXAM:    Vital Signs Last 24 Hrs  T(C): 36.4 (13 Sep 2022 11:38), Max: 37.5 (12 Sep 2022 16:00)  T(F): 97.6 (13 Sep 2022 11:38), Max: 99.5 (12 Sep 2022 16:00)  HR: 72 (13 Sep 2022 12:06) (70 - 78)  BP: 103/60 (13 Sep 2022 09:00) (103/60 - 134/72)  BP(mean): 73 (13 Sep 2022 09:00) (71 - 87)  RR: 21 (13 Sep 2022 09:00) (12 - 22)  SpO2: 98% (13 Sep 2022 12:06) (98% - 100%)    Parameters below as of 13 Sep 2022 08:00  Patient On (Oxygen Delivery Method): ventilator        Karnofsky:  %  General:         HEENT:  NCAT              (  )  ET tube   (  )  NGT  Lungs: comfortable  non labored  CV:    GI:             (  ) PEG  :               MSK:   Skin:  warm/dry  Neuro      LABS:                          11.5   7.50  )-----------( 272      ( 12 Sep 2022 04:20 )             36.6     09-12    135  |  93<L>  |  53.2<H>  ----------------------------<  104<H>  4.6   |  28.0  |  4.66<H>    Ca    9.6      12 Sep 2022 04:20  Phos  4.6     09-12  Mg     2.7     09-12          I&O's Summary    12 Sep 2022 07:01  -  13 Sep 2022 07:00  --------------------------------------------------------  IN: 1280 mL / OUT: 317 mL / NET: 963 mL        RADIOLOGY & ADDITIONAL STUDIES:      ADVANCE DIRECTIVES/TREATMENT PREFERENCES:   CC:  Follow up GOC , Symptoms    OVERNIGHT EVENTS:  remains on vent    Present Symptoms:   Dyspnea:  No - on vent  Nausea/Vomiting:  No   Anxiety:  No     Depression: Unable  Fatigue:  Yes     Loss of appetite:  NA   Constipation: Not Reported     Pain:  No Signs            Character-            Duration-            Location-            Severity-    Pain AD Score:  [email or web address] (press ctrl + left click to view)    Review of Systems: Reviewed as above  Further ROS unable to  obtain due to poor mentation   All others negative    MEDICATIONS  (STANDING):  artificial  tears Solution 1 Drop(s) Both EYES two times a day  atorvastatin 40 milliGRAM(s) Oral at bedtime  chlorhexidine 0.12% Liquid 15 milliLiter(s) Oral Mucosa every 12 hours  chlorhexidine 2% Cloths 1 Application(s) Topical daily  dextrose 5%. 1000 milliLiter(s) (50 mL/Hr) IV Continuous <Continuous>  dextrose 5%. 1000 milliLiter(s) (100 mL/Hr) IV Continuous <Continuous>  dextrose 50% Injectable 25 Gram(s) IV Push once  dextrose 50% Injectable 12.5 Gram(s) IV Push once  dextrose 50% Injectable 25 Gram(s) IV Push once  glucagon  Injectable 1 milliGRAM(s) IntraMuscular once  heparin   Injectable 5000 Unit(s) SubCutaneous every 12 hours  insulin glargine Injectable (LANTUS) 13 Unit(s) SubCutaneous at bedtime  insulin lispro (ADMELOG) corrective regimen sliding scale   SubCutaneous every 6 hours  levothyroxine 75 MICROGram(s) Enteral Tube daily  metoprolol tartrate 25 milliGRAM(s) Oral two times a day  pantoprazole   Suspension 40 milliGRAM(s) Oral before breakfast    MEDICATIONS  (PRN):  acetaminophen     Tablet .. 650 milliGRAM(s) Oral every 6 hours PRN Temp greater or equal to 38C (100.4F), Mild Pain (1 - 3)  dextrose Oral Gel 15 Gram(s) Oral once PRN Blood Glucose LESS THAN 70 milliGRAM(s)/deciliter  fentaNYL    Injectable 25 MICROGram(s) IV Push every 2 hours PRN Moderate Pain (4 - 6)  ondansetron Injectable 4 milliGRAM(s) IV Push every 6 hours PRN Nausea and/or Vomiting      PHYSICAL EXAM:    Vital Signs Last 24 Hrs  T(C): 36.4 (13 Sep 2022 11:38), Max: 37.5 (12 Sep 2022 16:00)  T(F): 97.6 (13 Sep 2022 11:38), Max: 99.5 (12 Sep 2022 16:00)  HR: 72 (13 Sep 2022 12:06) (70 - 78)  BP: 103/60 (13 Sep 2022 09:00) (103/60 - 134/72)  BP(mean): 73 (13 Sep 2022 09:00) (71 - 87)  RR: 21 (13 Sep 2022 09:00) (12 - 22)  SpO2: 98% (13 Sep 2022 12:06) (98% - 100%)    Parameters below as of 13 Sep 2022 08:00  Patient On (Oxygen Delivery Method): ventilator        Karnofsky: 10 %  General:  F + drain      HEENT:  NCAT    ( x )  ET tube  Lungs: comfortable  non labored  CV:  RR  GI:  soft NTND                  MSK: weak   Skin:  warm/dry  Neuro  obtunded     LABS:                          11.5   7.50  )-----------( 272      ( 12 Sep 2022 04:20 )             36.6     09-12    135  |  93<L>  |  53.2<H>  ----------------------------<  104<H>  4.6   |  28.0  |  4.66<H>    Ca    9.6      12 Sep 2022 04:20  Phos  4.6     09-12  Mg     2.7     09-12          I&O's Summary    12 Sep 2022 07:01  -  13 Sep 2022 07:00  --------------------------------------------------------  IN: 1280 mL / OUT: 317 mL / NET: 963 mL        RADIOLOGY & ADDITIONAL STUDIES:        ADVANCE DIRECTIVES/TREATMENT PREFERENCES:

## 2022-09-14 NOTE — PROGRESS NOTE ADULT - SUBJECTIVE AND OBJECTIVE BOX
NEPHROLOGY INTERVAL HPI/OVERNIGHT EVENTS:    Interim events noted   family meeting to take place at 5 pm today   No improvement in status   + DNR   NSICU team / palliative Care follow up noted     MEDICATIONS  (STANDING):  artificial  tears Solution 1 Drop(s) Both EYES two times a day  atorvastatin 40 milliGRAM(s) Oral at bedtime  chlorhexidine 0.12% Liquid 15 milliLiter(s) Oral Mucosa every 12 hours  chlorhexidine 2% Cloths 1 Application(s) Topical daily  dextrose 5%. 1000 milliLiter(s) (50 mL/Hr) IV Continuous <Continuous>  dextrose 5%. 1000 milliLiter(s) (100 mL/Hr) IV Continuous <Continuous>  dextrose 50% Injectable 25 Gram(s) IV Push once  dextrose 50% Injectable 12.5 Gram(s) IV Push once  dextrose 50% Injectable 25 Gram(s) IV Push once  glucagon  Injectable 1 milliGRAM(s) IntraMuscular once  insulin glargine Injectable (LANTUS) 13 Unit(s) SubCutaneous at bedtime  insulin lispro (ADMELOG) corrective regimen sliding scale   SubCutaneous every 6 hours  levothyroxine 75 MICROGram(s) Enteral Tube daily  metoprolol tartrate 25 milliGRAM(s) Oral two times a day  pantoprazole   Suspension 40 milliGRAM(s) Oral before breakfast    MEDICATIONS  (PRN):  acetaminophen     Tablet .. 650 milliGRAM(s) Oral every 6 hours PRN Temp greater or equal to 38C (100.4F), Mild Pain (1 - 3)  dextrose Oral Gel 15 Gram(s) Oral once PRN Blood Glucose LESS THAN 70 milliGRAM(s)/deciliter  ondansetron Injectable 4 milliGRAM(s) IV Push every 6 hours PRN Nausea and/or Vomiting  sodium chloride 0.9% Bolus. 100 milliLiter(s) IV Bolus every 5 minutes PRN SBP LESS THAN or EQUAL to 90 mmHg      Allergies    eggs (Anaphylaxis)  No Known Drug Allergies    Intolerances      Vital Signs Last 24 Hrs  T(C): 36.4 (14 Sep 2022 09:00), Max: 36.9 (13 Sep 2022 17:09)  T(F): 97.5 (14 Sep 2022 09:00), Max: 98.4 (13 Sep 2022 17:09)  HR: 68 (14 Sep 2022 13:00) (66 - 83)  BP: 85/55 (14 Sep 2022 13:00) (82/60 - 142/77)  BP(mean): 65 (14 Sep 2022 13:00) (65 - 96)  RR: 17 (14 Sep 2022 13:00) (12 - 24)  SpO2: 97% (14 Sep 2022 13:00) (97% - 100%)    Parameters below as of 14 Sep 2022 12:00  Patient On (Oxygen Delivery Method): ventilator    O2 Concentration (%): 30  Daily     Daily Weight in k.7 (13 Sep 2022 23:17)  I&O's Detail    13 Sep 2022 07:01  -  14 Sep 2022 07:00  --------------------------------------------------------  IN:    Nepro: 960 mL    Oral Fluid: 100 mL  Total IN: 1060 mL    OUT:    External Ventricular Device (mL): 128 mL    Other (mL): 0 mL    Rectal Tube (mL): 500 mL    Voided (mL): 0 mL  Total OUT: 628 mL    Total NET: 432 mL      14 Sep 2022 07:01  -  14 Sep 2022 14:10  --------------------------------------------------------  IN:    Nepro: 240 mL  Total IN: 240 mL    OUT:    External Ventricular Device (mL): 31 mL    Voided (mL): 0 mL  Total OUT: 31 mL    Total NET: 209 mL        I&O's Summary    13 Sep 2022 07:01  -  14 Sep 2022 07:00  --------------------------------------------------------  IN: 1060 mL / OUT: 628 mL / NET: 432 mL    14 Sep 2022 07:01  -  14 Sep 2022 14:10  --------------------------------------------------------  IN: 240 mL / OUT: 31 mL / NET: 209 mL        PHYSICAL EXAM:  GENERAL: Vented  HEENT: Oral intubation  NECK: Supple, No JVD  NERVOUS SYSTEM: Unresponsive  CHEST:  Diminished BS anteriorly  HEART:  RRR, No rub  ABDOMEN: Soft, NT/distended mildly BS+  EXTREMITIES:  + dependent edema      LABS:                        11.3   7.61  )-----------( 254      ( 13 Sep 2022 17:55 )             35.7         133<L>  |  90<L>  |  75.3<H>  ----------------------------<  156<H>  5.2   |  26.0  |  5.91<H>    Ca    9.7      13 Sep 2022 17:55  Phos  6.1               Phosphorus Level, Serum: 6.1 mg/dL ( @ 17:55)          RADIOLOGY & ADDITIONAL TESTS:

## 2022-09-14 NOTE — PROGRESS NOTE ADULT - SUBJECTIVE AND OBJECTIVE BOX
CC:  Follow up GOC , Symptoms    OVERNIGHT EVENTS:  remains intubated    Present Symptoms:   Dyspnea:  No  on vent  Nausea/Vomiting:  No   Anxiety:  No    Depression:   Unable  Fatigue:  Yes     Loss of appetite:    NA  + TF  Constipation: No    Pain:   No Signs            Character-            Duration-            Location-            Severity-    Pain AD Score:  [email or web address] (press ctrl + left click to view)    Review of Systems: Reviewed as above  Further ROS unable to  obtain due to poor mentation     MEDICATIONS  (STANDING):  artificial  tears Solution 1 Drop(s) Both EYES two times a day  atorvastatin 40 milliGRAM(s) Oral at bedtime  chlorhexidine 0.12% Liquid 15 milliLiter(s) Oral Mucosa every 12 hours  chlorhexidine 2% Cloths 1 Application(s) Topical daily  dextrose 5%. 1000 milliLiter(s) (50 mL/Hr) IV Continuous <Continuous>  dextrose 5%. 1000 milliLiter(s) (100 mL/Hr) IV Continuous <Continuous>  dextrose 50% Injectable 25 Gram(s) IV Push once  dextrose 50% Injectable 12.5 Gram(s) IV Push once  dextrose 50% Injectable 25 Gram(s) IV Push once  glucagon  Injectable 1 milliGRAM(s) IntraMuscular once  heparin   Injectable 5000 Unit(s) SubCutaneous every 12 hours  insulin glargine Injectable (LANTUS) 13 Unit(s) SubCutaneous at bedtime  insulin lispro (ADMELOG) corrective regimen sliding scale   SubCutaneous every 6 hours  levothyroxine 75 MICROGram(s) Enteral Tube daily  metoprolol tartrate 25 milliGRAM(s) Oral two times a day  pantoprazole   Suspension 40 milliGRAM(s) Oral before breakfast    MEDICATIONS  (PRN):  acetaminophen     Tablet .. 650 milliGRAM(s) Oral every 6 hours PRN Temp greater or equal to 38C (100.4F), Mild Pain (1 - 3)  dextrose Oral Gel 15 Gram(s) Oral once PRN Blood Glucose LESS THAN 70 milliGRAM(s)/deciliter  ondansetron Injectable 4 milliGRAM(s) IV Push every 6 hours PRN Nausea and/or Vomiting  sodium chloride 0.9% Bolus. 100 milliLiter(s) IV Bolus every 5 minutes PRN SBP LESS THAN or EQUAL to 90 mmHg      PHYSICAL EXAM:    Vital Signs Last 24 Hrs  T(C): 36.4 (14 Sep 2022 04:38), Max: 36.9 (13 Sep 2022 17:09)  T(F): 97.5 (14 Sep 2022 04:38), Max: 98.4 (13 Sep 2022 17:09)  HR: 67 (14 Sep 2022 07:00) (66 - 83)  BP: 94/59 (14 Sep 2022 07:00) (82/60 - 142/77)  BP(mean): 70 (14 Sep 2022 07:00) (65 - 96)  RR: 17 (14 Sep 2022 07:00) (13 - 26)  SpO2: 99% (14 Sep 2022 07:00) (98% - 100%)    Parameters below as of 14 Sep 2022 06:00  Patient On (Oxygen Delivery Method): ventilator    O2 Concentration (%): 30    Karnofsky: 10 %  General:  F NAD    HEENT:  NCAT    ( x )  ET tube   ( x )  )GT  Lungs: comfortable  non labored  CV:  RR  GI:   soft NTND           :   + rectal tube/brown stool            MSK: weak bedbound  Skin:  warm/dry  Neuro  non responsive to tactile stimli    LABS:                          11.3   7.61  )-----------( 254      ( 13 Sep 2022 17:55 )             35.7     09-13    133<L>  |  90<L>  |  75.3<H>  ----------------------------<  156<H>  5.2   |  26.0  |  5.91<H>    Ca    9.7      13 Sep 2022 17:55  Phos  6.1     09-13          I&O's Summary    13 Sep 2022 07:01  -  14 Sep 2022 07:00  --------------------------------------------------------  IN: 1060 mL / OUT: 628 mL / NET: 432 mL        RADIOLOGY & ADDITIONAL STUDIES:      ADVANCE DIRECTIVES/TREATMENT PREFERENCES:

## 2022-09-14 NOTE — PROGRESS NOTE ADULT - ASSESSMENT
Assessment  63yo F with extensive medical problems, p/w hemorrhagic stroke, IVH/hydrocephalus  brain compression requiring EVD placement  acute hypoxic respiratory failure, aspiration pneumonia   Severe hyperglycemia - resolved  hyponatremia resolved    Now PBD 10    Plan  - neuro checks q4hr  - EVD at 0, keep ICP <22. EVD is intermittently working,   - maintain normothermia, tylenol prn  - keep BP , pt has been hypotensive, all po meds d/jonatan except low dose metoprolol  - cont vent support  - tolerating TF Nepro  - nephrology following, pt is on HD, had HD yesterday 9/13   - completed course of Zosyn for aspiration pneumonia  - SCDs for DVT prophylaxis  - poor prognosis  - cont lantus & SS  - cont synthroid  - cont GOC discussion (pt was made DNR), Palliative is following  - GOC discussion with family set up for today 9/14  - Further plan pending am rounds with attending

## 2022-09-14 NOTE — PROGRESS NOTE ADULT - ASSESSMENT
63 yo F with multiple co-morbidities, presented with large IVH/ obstructive hydrocephalus, concern for L BG ICH, score 3. S/p emergent EVD placement.  Nonocclusive thrombus in the proximal right basilar artery. Occluded left vertebral artery distal V4 segment.  H/O CAD on ASA; Afib not on AC due to GIB. Mixed CHF. Acute resp failure. PNA. ,Cirrhosis with moderate ascites. S/p paracentesis 9/5.Hyperglycemia, DM. CT head without contrast demonstrates likely left basal ganglia ICH with extensive IVH with casting of the right, third, and fourth ventricles.  Exam remains poor with no corneal reflexes, fixed pupils, no cough/gag or movement to stim. Continue goals of care discussion with family vs formal brain death testing.      ESRD - ICB , no neurological improvement   NSICU team, Neurosurgery ,  and palliative follow up noted   family has made DNR  Today I had a phone conversation with the daughter Keshia ( # [phone] ) and discussed discontinuation of HD, especially in the setting of her dismal neurologic prognosis .   Hope of a meaningful recovery is very poor   The daughter Keshia told me that , her and her brother are meeting with the NSICU / Palliative team today at 5 pm re. continuation of care .  She will make decision re. cessation of Dialysis after this.  I will be in contact with her

## 2022-09-14 NOTE — PROGRESS NOTE ADULT - ASSESSMENT
62yr old  woman with Past MHX   ESRD on HD, OP, CHF, DM, PAF  hypertension, hyperlipidemia. anemia,  cirrhosis admitted with IVH with poor neurological exam    IVH  poor neurological status  neurochecks  plan per NSICU    Respiratory Failure  cont O2 support  monitor O2 sats  trach/peg?    ESRD  HD per renal  avoid nephrotoxic agents    Encounter for Palliative Care  Plan for family meeting today at 5pm  Patient with poor neurological outlook.

## 2022-09-14 NOTE — PROGRESS NOTE ADULT - SUBJECTIVE AND OBJECTIVE BOX
Chief complaint:   Patient is a 62y old  Female who presents with a chief complaint of severe IVH  + ASA 81m PTA (11 Sep 2022 00:28)    HPI:  Patient is a 62y old  Female who presents with a chief complaint of hypertensive crisis/ hyperglycemia ~600/ fever TMax: 100.7 w vomiting, who was declining clinically w lethargy since in ED, CTB was obtained and extensive B/L IVH casting 3rd and 4th vents were noted. pt has been intubated shortly after CT for poor exam/ agonal breaths/ GCS 4.  per d/w son, Feliciano, unable to reach Keshia, daughter/ HCP, full code and all measures to be done, R/B?A of EVD eplained alongside possibility of worsening hemorrhage and he understood and consent obtained via phone.    - LOC   - trauma reported   ?Headache/ unable to obtain from pt/ per ER RN pt did not endorse headache   + Nausea /+ Vomiting  pt was dropped off to ED by granddaughter     24hr EVENTS: No events   Last Dialysis 9/13  S/P treatment PNA      ROS: [x ]  Unable to assess due to mental status   All other systems negative    ICU Vital Signs Last 24 Hrs  T(C): 36.4 (14 Sep 2022 09:00), Max: 36.9 (13 Sep 2022 17:09)  T(F): 97.5 (14 Sep 2022 09:00), Max: 98.4 (13 Sep 2022 17:09)  HR: 71 (14 Sep 2022 10:00) (66 - 83)  BP: 91/59 (14 Sep 2022 10:00) (82/60 - 142/77)  BP(mean): 69 (14 Sep 2022 10:00) (65 - 96)  RR: 17 (14 Sep 2022 10:00) (13 - 26)  SpO2: 99% (14 Sep 2022 10:00) (98% - 100%)    O2 Parameters below as of 14 Sep 2022 09:00        13 Sep 2022 07:01  -  14 Sep 2022 07:00  --------------------------------------------------------  IN:    Nepro: 960 mL    Oral Fluid: 100 mL  Total IN: 1060 mL    OUT:    External Ventricular Device (mL): 128 mL    Other (mL): 0 mL    Rectal Tube (mL): 500 mL    Voided (mL): 0 mL  Total OUT: 628 mL    Total NET: 432 mL      14 Sep 2022 07:01  -  14 Sep 2022 11:02  --------------------------------------------------------  IN:    Nepro: 80 mL  Total IN: 80 mL    OUT:    External Ventricular Device (mL): 10 mL    Voided (mL): 0 mL  Total OUT: 10 mL    Total NET: 70 mL          MEDICATIONS  (STANDING):  artificial  tears Solution 1 Drop(s) Both EYES two times a day  atorvastatin 40 milliGRAM(s) Oral at bedtime  chlorhexidine 0.12% Liquid 15 milliLiter(s) Oral Mucosa every 12 hours  chlorhexidine 2% Cloths 1 Application(s) Topical daily  dextrose 5%. 1000 milliLiter(s) (50 mL/Hr) IV Continuous <Continuous>  dextrose 5%. 1000 milliLiter(s) (100 mL/Hr) IV Continuous <Continuous>  dextrose 50% Injectable 25 Gram(s) IV Push once  dextrose 50% Injectable 12.5 Gram(s) IV Push once  dextrose 50% Injectable 25 Gram(s) IV Push once  glucagon  Injectable 1 milliGRAM(s) IntraMuscular once  insulin glargine Injectable (LANTUS) 13 Unit(s) SubCutaneous at bedtime  insulin lispro (ADMELOG) corrective regimen sliding scale   SubCutaneous every 6 hours  levothyroxine 75 MICROGram(s) Enteral Tube daily  metoprolol tartrate 25 milliGRAM(s) Oral two times a day  pantoprazole   Suspension 40 milliGRAM(s) Oral before breakfast    MEDICATIONS  (PRN):  acetaminophen     Tablet .. 650 milliGRAM(s) Oral every 6 hours PRN Temp greater or equal to 38C (100.4F), Mild Pain (1 - 3)  dextrose Oral Gel 15 Gram(s) Oral once PRN Blood Glucose LESS THAN 70 milliGRAM(s)/deciliter  ondansetron Injectable 4 milliGRAM(s) IV Push every 6 hours PRN Nausea and/or Vomiting  sodium chloride 0.9% Bolus. 100 milliLiter(s) IV Bolus every 5 minutes PRN SBP LESS THAN or EQUAL to 90 mmHg        RESPIRATORY:  Mode: AC/ CMV (Assist Control/ Continuous Mandatory Ventilation)  RR (machine): 12  TV (machine): 380  FiO2: 30  PEEP: 5  MAP: 12  PIP: 29        IMAGING:   Recent imaging studies were reviewed.    CT Head No Cont (09.05.22 @ 04:44) >  IMPRESSION:  A right frontal approach external ventricular drain terminates in third   ventricle.  Mild postprocedural pneumocephalus.    Severe intraventricular hemorrhage with casting the ventricular system   and moderate hydrocephalus is stable.    New mild subarachnoid hemorrhage within the cerebral hemispheres and   sylvian fissures bilaterally probably reflects recirculation of   intraventricular blood products, rather than new hemorrhage.    There is suspicion for underlying left thalamic parenchymal hemorrhage,   which is difficult to distinguish from the intraventricular hemorrhage in   the adjacent third ventricle. Follow-up MRI may be obtained for further      TTE Echo Complete w/o Contrast w/ Doppler (09.05.22 @ 09:02) >    Summary:   1. Normal left ventricular internal cavity size.   2. There is mild concentric left ventricular hypertrophy.   3. Normal global left ventricular systolic function.   4. Left ventricular ejection fraction, by visual estimation, is 55 to   60%.   5. Spectral Doppler shows pseudonormal pattern of left ventricular   myocardial filling (Grade II diastolic dysfunction).   6. Normal right ventricular size and function.   7. Mildly enlarged left atrium.   8. Mildly enlarged right atrium.   9. Mild mitral annular calcification.  10. Mild mitral valve regurgitation.  11. Mild thickening and calcification of the anterior and posterior   mitral valve leaflets.  12. Mild-moderate tricuspid regurgitation.  13. Normal trileaflet aortic valve with normal opening.  14. Small mobile echogenicity visualized on the aortic valve, as well as   questionable lambl's excrescence.  15. Estimated pulmonary artery systolic pressure is 37.6 mmHg assuming a   right atrial pressure of 8 mmHg, which is consistent with borderline  pulmonary hypertension.  16. Mildly dilated pulmonary artery.  17. Mobile intra-atrial septum.  18. Color flow doppler and intravenous injection of agitated saline   demonstrates the presence of an intact intra atrial septum.  19. There is no evidence of pericardial effusion.        LAB RESULTS:                          11.3   7.61  )-----------( 254      ( 13 Sep 2022 17:55 )             35.7     09-13    133<L>  |  90<L>  |  75.3<H>  ----------------------------<  156<H>  5.2   |  26.0  |  5.91<H>    Ca    9.7      13 Sep 2022 17:55  Phos  6.1     09-13        PHYSICAL EXAM:  General: Calm, intubated  HEENT: pupils 3mm and non reactive OVAL  biteblock in place, Tongue - bloody with scab and medication ( [place]oot[place] )  Neuro:  -Mental status- No grimacing to noxious , no dolls, no gaga, no cough, no corneals , breaths over vent settingf  flaccid in all ext    CV: RRR  Pulm: coarse breath sounds to auscultation  Abd: Soft, nontender, nondistended  Ext: no noted edema in lower ext  Skin: warm, dry    copious oral, minimal midline secretions

## 2022-09-14 NOTE — PROGRESS NOTE ADULT - SUBJECTIVE AND OBJECTIVE BOX
Follow up: diabetes, hypothyroid  Interval Hx/Events: remained intubated and critical    MEDICATIONS  (STANDING):  artificial  tears Solution 1 Drop(s) Both EYES two times a day  atorvastatin 40 milliGRAM(s) Oral at bedtime  chlorhexidine 0.12% Liquid 15 milliLiter(s) Oral Mucosa every 12 hours  chlorhexidine 2% Cloths 1 Application(s) Topical daily  dextrose 5%. 1000 milliLiter(s) (100 mL/Hr) IV Continuous <Continuous>  dextrose 5%. 1000 milliLiter(s) (50 mL/Hr) IV Continuous <Continuous>  dextrose 50% Injectable 25 Gram(s) IV Push once  dextrose 50% Injectable 12.5 Gram(s) IV Push once  dextrose 50% Injectable 25 Gram(s) IV Push once  glucagon  Injectable 1 milliGRAM(s) IntraMuscular once  insulin glargine Injectable (LANTUS) 13 Unit(s) SubCutaneous at bedtime  insulin lispro (ADMELOG) corrective regimen sliding scale   SubCutaneous every 6 hours  levothyroxine 75 MICROGram(s) Enteral Tube daily  metoprolol tartrate 25 milliGRAM(s) Oral two times a day  pantoprazole   Suspension 40 milliGRAM(s) Oral before breakfast    MEDICATIONS  (PRN):  acetaminophen     Tablet .. 650 milliGRAM(s) Oral every 6 hours PRN Temp greater or equal to 38C (100.4F), Mild Pain (1 - 3)  dextrose Oral Gel 15 Gram(s) Oral once PRN Blood Glucose LESS THAN 70 milliGRAM(s)/deciliter  ondansetron Injectable 4 milliGRAM(s) IV Push every 6 hours PRN Nausea and/or Vomiting  sodium chloride 0.9% Bolus. 100 milliLiter(s) IV Bolus every 5 minutes PRN SBP LESS THAN or EQUAL to 90 mmHg    ALLERGIES: eggs (Anaphylaxis)  No Known Drug Allergies    REVIEW OF SYSTEMS: unable to assess due to medical condition    Vital Signs Last 24 Hrs  T(C): 36.4 (14 Sep 2022 09:00), Max: 36.9 (13 Sep 2022 17:09)  T(F): 97.5 (14 Sep 2022 09:00), Max: 98.4 (13 Sep 2022 17:09)  HR: 73 (14 Sep 2022 15:15) (66 - 83)  BP: 78/49 (14 Sep 2022 15:00) (78/49 - 142/77)  BP(mean): 59 (14 Sep 2022 15:00) (59 - 96)  RR: 14 (14 Sep 2022 15:00) (12 - 24)  SpO2: 99% (14 Sep 2022 15:15) (97% - 100%)    Parameters below as of 14 Sep 2022 12:00  Patient On (Oxygen Delivery Method): ventilator    O2 Concentration (%): 30    Physical Exam:  Physical Exam: limited due to mental status, pt intubated  General appearance: intubated, not responsive  Lungs: Normal respiratory excursion. Lungs clear no w/r/r  CV: Normal S1S2, regular.  Abdomen: Soft      LABS:                        11.3   7.61  )-----------( 254      ( 13 Sep 2022 17:55 )             35.7     09-13    133<L>  |  90<L>  |  75.3<H>  ----------------------------<  156<H>  5.2   |  26.0  |  5.91<H>    Ca    9.7      13 Sep 2022 17:55  Phos  6.1     09-13    A1C with Estimated Average Glucose Result: 10.6 % (09-05-22 @ 02:41)    CAPILLARY BLOOD GLUCOSE  POCT Blood Glucose.: 170 mg/dL (14 Sep 2022 13:15)  POCT Blood Glucose.: 129 mg/dL (14 Sep 2022 06:10)  POCT Blood Glucose.: 141 mg/dL (14 Sep 2022 00:41)  POCT Blood Glucose.: 156 mg/dL (13 Sep 2022 17:21)  POCT Blood Glucose.: 179 mg/dL (13 Sep 2022 11:58)  POCT Blood Glucose.: 136 mg/dL (13 Sep 2022 05:58)  POCT Blood Glucose.: 185 mg/dL (13 Sep 2022 00:33)  POCT Blood Glucose.: 135 mg/dL (12 Sep 2022 17:24)      Thyroid Stimulating Hormone, Serum: 11.36 uIU/mL [0.27 - 4.20] (09-05-22)  T4, Serum: 7.7 ug/dL [4.5 - 12.0] (09-05-22)  Thyroid Stimulating Hormone, Serum: 14.44 uIU/mL [0.27 - 4.20] (09-05-22)

## 2022-09-14 NOTE — PROGRESS NOTE ADULT - SUBJECTIVE AND OBJECTIVE BOX
HPI: Patient is a 62y old  Female who presents with a chief complaint of hypertensive crisis/ hyperglycemia ~600/ fever TMax: 100.7 w vomiting, who was declining clinically w lethargy since in ED, CTB was obtained and extensive B/L IVH casting 3rd and 4th vents were noted. pt has been intubated shortly after CT for poor exam/ agonal breaths/ GCS 4.  per d/w son, Feliciano, unable to reach Keshia, daughter/ HCP, full code and all measures to be done, R/B?A of EVD eplained alongside possibility of worsening hemorrhage and he understood and consent obtained via phone.    - LOC   - trauma reported   ?Headache/ unable to obtain from pt/ per ER RN pt did not endorse headache   + Nausea /+ Vomiting  pt was dropped off to ED by granddaughter     Mode: AC/ CMV (Assist Control/ Continuous Mandatory Ventilation)  RR (machine): 16  TV (machine): 0.4  FiO2: 50  PEEP: 5  PIP: 16    Vital Signs Last 24 Hrs  T(C): 38.2 (04 Sep 2022 13:58), Max: 38.2 (04 Sep 2022 13:58)  T(F): 100.7 (04 Sep 2022 13:58), Max: 100.7 (04 Sep 2022 13:58)  HR: 73 (04 Sep 2022 17:36) (70 - 92)  BP: 134/71 (04 Sep 2022 17:36) (119/68 - 200/106)  BP(mean): --  RR: 16 (04 Sep 2022 17:36) (16 - 21)  SpO2: 100% (04 Sep 2022 17:36) (93% - 100%)    Parameters below as of 04 Sep 2022 17:36  Patient On (Oxygen Delivery Method): ventilator (04 Sep 2022 18:00)      INTERVAL HPI/OVERNIGHT EVENTS:  62y Female s/p __ seen lying comfortably in bed. Tolerating diet. Passing gas/BM. Voiding. Morton in with __ clear urine. Denies headache, weakness, numbness, n/v/d, fevers, chills, chest pain, SOB.     Vital Signs Last 24 Hrs  T(C): 36.4 (13 Sep 2022 23:17), Max: 37.1 (13 Sep 2022 04:02)  T(F): 97.5 (13 Sep 2022 23:17), Max: 98.8 (13 Sep 2022 04:02)  HR: 71 (13 Sep 2022 23:00) (71 - 83)  BP: 98/61 (13 Sep 2022 23:00) (82/60 - 142/77)  BP(mean): 72 (13 Sep 2022 23:00) (65 - 96)  RR: 16 (13 Sep 2022 23:00) (15 - 26)  SpO2: 100% (13 Sep 2022 23:00) (98% - 100%)    Parameters below as of 13 Sep 2022 22:00  Patient On (Oxygen Delivery Method): ventilator    O2 Concentration (%): 30    PHYSICAL EXAM:      LABS:                        11.3   7.61  )-----------( 254      ( 13 Sep 2022 17:55 )             35.7     09-13    133<L>  |  90<L>  |  75.3<H>  ----------------------------<  156<H>  5.2   |  26.0  |  5.91<H>    Ca    9.7      13 Sep 2022 17:55  Phos  6.1     09-13  Mg     2.7     09-12 09-12 @ 07:01 - 09-13 @ 07:00  --------------------------------------------------------  IN: 1280 mL / OUT: 317 mL / NET: 963 mL    09-13 @ 07:01 - 09-14 @ 00:36  --------------------------------------------------------  IN: 690 mL / OUT: 275 mL / NET: 415 mL        RADIOLOGY & ADDITIONAL TESTS:           HPI: Patient is a 62y old  Female who presents with a chief complaint of hypertensive crisis/ hyperglycemia ~600/ fever TMax: 100.7 w vomiting, who was declining clinically w lethargy since in ED, CTB was obtained and extensive B/L IVH casting 3rd and 4th vents were noted. pt has been intubated shortly after CT for poor exam/ agonal breaths/ GCS 4.  per d/w son, Feliciano, unable to reach Keshia, daughter/ HCP, full code and all measures to be done, R/B?A of EVD eplained alongside possibility of worsening hemorrhage and he understood and consent obtained via phone.    - LOC   - trauma reported   ?Headache/ unable to obtain from pt/ per ER RN pt did not endorse headache   + Nausea /+ Vomiting  pt was dropped off to ED by granddaughter     Mode: AC/ CMV (Assist Control/ Continuous Mandatory Ventilation)  RR (machine): 16  TV (machine): 0.4  FiO2: 50  PEEP: 5  PIP: 16    Vital Signs Last 24 Hrs  T(C): 38.2 (04 Sep 2022 13:58), Max: 38.2 (04 Sep 2022 13:58)  T(F): 100.7 (04 Sep 2022 13:58), Max: 100.7 (04 Sep 2022 13:58)  HR: 73 (04 Sep 2022 17:36) (70 - 92)  BP: 134/71 (04 Sep 2022 17:36) (119/68 - 200/106)  BP(mean): --  RR: 16 (04 Sep 2022 17:36) (16 - 21)  SpO2: 100% (04 Sep 2022 17:36) (93% - 100%)    Parameters below as of 04 Sep 2022 17:36  Patient On (Oxygen Delivery Method): ventilator (04 Sep 2022 18:00)      INTERVAL HPI/OVERNIGHT EVENTS:  Patient exam remains poor, pending [facility] meeting with family tomorrow 9/15/22.      Vital Signs Last 24 Hrs  T(C): 36.4 (13 Sep 2022 23:17), Max: 37.1 (13 Sep 2022 04:02)  T(F): 97.5 (13 Sep 2022 23:17), Max: 98.8 (13 Sep 2022 04:02)  HR: 71 (13 Sep 2022 23:00) (71 - 83)  BP: 98/61 (13 Sep 2022 23:00) (82/60 - 142/77)  BP(mean): 72 (13 Sep 2022 23:00) (65 - 96)  RR: 16 (13 Sep 2022 23:00) (15 - 26)  SpO2: 100% (13 Sep 2022 23:00) (98% - 100%)    Parameters below as of 13 Sep 2022 22:00  Patient On (Oxygen Delivery Method): ventilator    O2 Concentration (%): 30    PHYSICAL EXAM:  General: No agitation, intubated  HEENT: pupils 3mm and non reactive   biteblock in place  Neuro:  -Mental status- No motor to noxious, no dolls, no gag, no cough, no corneals, breathing over vent    LABS:                        11.3   7.61  )-----------( 254      ( 13 Sep 2022 17:55 )             35.7     09-13    133<L>  |  90<L>  |  75.3<H>  ----------------------------<  156<H>  5.2   |  26.0  |  5.91<H>    Ca    9.7      13 Sep 2022 17:55  Phos  6.1     09-13  Mg     2.7     09-12 09-12 @ 07:01 - 09-13 @ 07:00  --------------------------------------------------------  IN: 1280 mL / OUT: 317 mL / NET: 963 mL    09-13 @ 07:01  -  09-14 @ 00:36  --------------------------------------------------------  IN: 690 mL / OUT: 275 mL / NET: 415 mL        RADIOLOGY & ADDITIONAL TESTS:    < from: CT Head No Cont (09.05.22 @ 04:44) >    IMPRESSION:  A right frontal approach external ventricular drain terminates in third   ventricle.  Mild postprocedural pneumocephalus.    Severe intraventricular hemorrhage with casting the ventricular system   and moderate hydrocephalus is stable.    New mild subarachnoid hemorrhage within the cerebral hemispheres and   sylvian fissures bilaterally probably reflects recirculation of   intraventricular blood products, rather than new hemorrhage.    There is suspicion for underlying left thalamic parenchymal hemorrhage,   which is difficult to distinguish from the intraventricular hemorrhage in   the adjacent third ventricle. Follow-up MRI may be obtained for further   evaluation.

## 2022-09-14 NOTE — PROGRESS NOTE ADULT - ASSESSMENT
63 yo F with multiple co-morbidities, presented with large IVH/ obstructive hydrocephalus, concern for L BG ICH, score 3. S/p emergent EVD placement.  Nonocclusive thrombus in the proximal right basilar artery. Occluded left vertebral artery distal V4 segment.  H/o CAD on ASA; Afib not on AC due to GIB. Mixed CHF.  Acute resp failure. PNA.  Cirrhosis with moderate ascites. S/p paracentesis 9/5.  ESRD on HD.  Hyperglycemia, DM.    NEURO:   cont q4h neurochecks, off sedation, PRN fentanyl 25 for agitation/vent   maintain EVD @0, monitor outp and ICP (118cc in 24hr)  family meeting today   [facility] On contacted   pain mgt: tylenol and oxy 5 prn  Palliative consult- GOC discussion with the family members    Activity: [x] mobilize as tolerated [] Bedrest [x] PT [x] OT [] PMNR    PULM: vent support, adjust as needed, SBT as tolerated  SpO2>92%  OOB  highPpeak likely due to clots- lavage prn    CV:  metoprolol 25mg   MAP - 65  statin    RENAL: monitor I/O  HD 9/10 0.6L removed, Renal involvement appreciated   anuric  Dialysis  - last - would suggest Nephrology join Family meeting and D/C dialysis    GI: Diarrhea  TF- BM regimen, PPI for ppx  rectal tube- liquid stools  PPI  banatrol      ENDO:   Goal euglycemia (-180)  SSI for now, monitor FS  lantus 13units daily  synthroid    HEME/ONC:  VTE prophylaxis: [] SCDs [x] chemoprophylaxis heparin  SCDs, no AC in view of brain bleed    ID: afebrile/ S/P PNA    no leukocytosis  Zosyn completed  cultures NGTD    MISC:    I affirm that this patient is critically ill and at high risk for sudden, fatal deterioration due to one or more of the above stated active issues.     This time does not include bedside procedures that are documented separately.       61 yo F with multiple co-morbidities, presented with large IVH/ obstructive hydrocephalus, concern for L BG ICH, score 3. S/p emergent EVD placement.  Nonocclusive thrombus in the proximal right basilar artery. Occluded left vertebral artery distal V4 segment.  H/o CAD on ASA; Afib not on AC due to GIB. Mixed CHF.  Acute resp failure. PNA.  Cirrhosis with moderate ascites. S/p paracentesis 9/5.  ESRD on HD.  Hyperglycemia, DM.    NEURO:   cont q4h neurochecks, off sedation, PRN fentanyl 25 for agitation/vent   maintain EVD @0, monitor outp and ICP (118cc in 24hr)  family meeting today   [facility] On contacted   pain mgt: tylenol and oxy 5 prn  Palliative consult- GOC discussion with the family members    Activity:  [X] Bedrest     PULM: vent support, adjust as needed, SBT as tolerated  SpO2>92%      CV:  metoprolol 25mg   MAP - 65  statin    RENAL: monitor I/O  HD 9/10 0.6L removed, Renal involvement appreciated   anuric  Dialysis  - last - would suggest Nephrology join Family meeting and D/C dialysis    GI: Diarrhea  TF- BM regimen, PPI for ppx  rectal tube- liquid stools  PPI  banatrol      ENDO:   Goal euglycemia (-180)  SSI for now, monitor FS  lantus 13units daily  synthroid    HEME/ONC:  VTE prophylaxis: [] SCDs [x] chemoprophylaxis heparin  SCDs, no AC in view of brain bleed    ID: afebrile/ S/P PNA    no leukocytosis  Zosyn completed  cultures NGTD    MISC:    I affirm that this patient is critically ill and at high risk for sudden, fatal deterioration due to one or more of the above stated active issues.     This time does not include bedside procedures that are documented separately.

## 2022-09-14 NOTE — PROGRESS NOTE ADULT - NS ATTEND AMEND GEN_ALL_CORE FT
I have seen and examined patient with NP, agree with above assessment and plan
Neurosurgery Attending Attestation:    Patient seen and examined at bedside. Agree with plan and note as documented above.    62-year-old woman with past medical history significant for hypertension, hyperlipidemia, ESRD on HD, OP, CHF, DM, paroxysmal A. fib, hypothyroid, anemia of chronic disease, cirrhosis, and recent admission for pneumonia who presents with acute onset loss of consciousness. CT head without contrast demonstrates likely left basal ganglia ICH with extensive IVH with casting of the right, third, and fourth ventricles.  Exam remains poor with no corneal reflexes, fixed pupils, no cough/gag or movement to stim. Continue goals of care discussion with family vs formal brain death testing.    -Marlene King MD
agree
I have seen and examined patient with NP, agree with above assessment and plan
I have seen and examined patient with NP, agree with above assessment and plan
Neurosurgery Attending Attestation:    Patient seen and examined at bedside. Agree with plan and note as documented above.    62-year-old woman with past medical history significant for hypertension, hyperlipidemia, ESRD on HD, OP, CHF, DM, paroxysmal A. fib, hypothyroid, anemia of chronic disease, cirrhosis, and recent admission for pneumonia who presents with acute onset loss of consciousness. CT head without contrast demonstrates likely left basal ganglia ICH with extensive IVH with casting of the right, third, and fourth ventricles.  Exam remains poor, pupils 4 mm and fixed, negative corneals, negative cough/gag, no movement in BUE and BLE. EVD with minimal drainage and does not drain when dropped. Goals of care discussion with family    -Marlene King MD
Neurosurgery Attending Attestation:    Patient seen and examined at bedside. Agree with plan and note as documented above.    62-year-old woman with past medical history significant for hypertension, hyperlipidemia, ESRD on HD, OP, CHF, DM, paroxysmal A. fib, hypothyroid, anemia of chronic disease, cirrhosis, and recent admission for pneumonia who presents with acute onset loss of consciousness. CT head without contrast demonstrates likely left basal ganglia ICH with extensive IVH with casting of the right, third, and fourth ventricles.  This morning neurological exam remains poor, pupils 4 mm and fixed, positive cough gag, trace extension in LUE, no movement in RUE, triple flexion bilateral lower extremities.  EVD with minimal drainage and does not drain when dropped but with good overall waveform. Patient prognosis unfortunately remains extremely poor. Plans for goals of care discussion with family    -Marlene King MD
I have seen and examined patient with NP, agree with above assessment and plan
Neurosurgery Attending Attestation:    Patient seen and examined at bedside. Agree with plan and note as documented above.    62-year-old woman with past medical history significant for hypertension, hyperlipidemia, ESRD on HD, OP, CHF, DM, paroxysmal A. fib, hypothyroid, anemia of chronic disease, cirrhosis, and recent admission for pneumonia who presents with acute onset loss of consciousness and presented to [facility] ED with 4 mm fixed pupils, weak corneals positive cough/gag, and extending in the uppers and trouble flexing the lowers.  CT head without contrast demonstrates likely left basal ganglia ICH with extensive IVH with casting of the right, third, and fourth ventricles.  Patient had an elevated APTT and received DDAVP and platelets and a right frontal EVD was placed.  This morning neurological exam remains largely unchanged to worst, pupils 4 mm and fixed, positive cough gag, trace extension in bilateral upper extremities, triple flexion bilateral lower extremities.  EVD with minimal drainage and does not drain when dropped but with good overall waveform and repeat head CT demonstrates stable casting of the ventricles.  Patient prognosis unfortunately remains extremely poor and is GCS  E1 V1 M2 = 4T.  Plans for goals of care discussion with family    -Marlene King MD
Neurosurgery Attending Attestation:    Patient seen and examined at bedside. Agree with plan and note as documented above.    62-year-old woman with past medical history significant for hypertension, hyperlipidemia, ESRD on HD, OP, CHF, DM, paroxysmal A. fib, hypothyroid, anemia of chronic disease, cirrhosis, and recent admission for pneumonia who presents with acute onset loss of consciousness and presented to [facility] ED with 4 mm fixed pupils, weak corneals positive cough/gag, and extending in the uppers and trouble flexing the lowers.  CT head without contrast demonstrates likely left basal ganglia ICH with extensive IVH with casting of the right, third, and fourth ventricles.  This morning neurological exam remains poor, pupils 4 mm and fixed, positive cough gag, trace extension in LUE, no movement in RUE, triple flexion bilateral lower extremities.  EVD with minimal drainage and does not drain when dropped but with good overall waveform. Patient prognosis unfortunately remains extremely poor. Plans for goals of care discussion with family    -Marlene King MD
Neurosurgery Attending Attestation:    Patient seen and examined at bedside. Agree with plan and note as documented above.    62-year-old woman with past medical history significant for hypertension, hyperlipidemia, ESRD on HD, OP, CHF, DM, paroxysmal A. fib, hypothyroid, anemia of chronic disease, cirrhosis, and recent admission for pneumonia who presents with acute onset loss of consciousness. CT head without contrast demonstrates likely left basal ganglia ICH with extensive IVH with casting of the right, third, and fourth ventricles.  Exam remains poor with no corneal reflexes, fixed pupils, no cough/gag or movement to stim. Continue goals of care discussion with family vs formal brain death testing.    -Marlene King MD
Neurosurgery Attending Attestation:    Patient seen and examined at bedside. Agree with plan and note as documented above.    62-year-old woman with past medical history significant for hypertension, hyperlipidemia, ESRD on HD, OP, CHF, DM, paroxysmal A. fib, hypothyroid, anemia of chronic disease, cirrhosis, and recent admission for pneumonia who presents with acute onset loss of consciousness. CT head without contrast demonstrates likely left basal ganglia ICH with extensive IVH with casting of the right, third, and fourth ventricles.  Exam remains poor with no corneal reflexes, fixed pupils, no cough/gag or movement to stim. Continue goals of care discussion with family vs formal brain death testing.    -Marlene King MD.
agree

## 2022-09-15 NOTE — PROGRESS NOTE ADULT - SUBJECTIVE AND OBJECTIVE BOX
HPI: Patient is a 62y old  Female who presents with a chief complaint of hypertensive crisis/ hyperglycemia ~600/ fever TMax: 100.7 w vomiting, who was declining clinically w lethargy since in ED, CTB was obtained and extensive B/L IVH casting 3rd and 4th vents were noted. pt has been intubated shortly after CT for poor exam/ agonal breaths/ GCS 4.  per d/w son, Feliciano, unable to reach Keshia, daughter/ HCP, full code and all measures to be done, R/B?A of EVD eplained alongside possibility of worsening hemorrhage and he understood and consent obtained via phone.    - LOC   - trauma reported   ?Headache/ unable to obtain from pt/ per ER RN pt did not endorse headache   + Nausea /+ Vomiting  pt was dropped off to ED by granddaughter     Mode: AC/ CMV (Assist Control/ Continuous Mandatory Ventilation)  RR (machine): 16  TV (machine): 0.4  FiO2: 50  PEEP: 5  PIP: 16    Vital Signs Last 24 Hrs  T(C): 38.2 (04 Sep 2022 13:58), Max: 38.2 (04 Sep 2022 13:58)  T(F): 100.7 (04 Sep 2022 13:58), Max: 100.7 (04 Sep 2022 13:58)  HR: 73 (04 Sep 2022 17:36) (70 - 92)  BP: 134/71 (04 Sep 2022 17:36) (119/68 - 200/106)  BP(mean): --  RR: 16 (04 Sep 2022 17:36) (16 - 21)  SpO2: 100% (04 Sep 2022 17:36) (93% - 100%)    Parameters below as of 04 Sep 2022 17:36  Patient On (Oxygen Delivery Method): ventilator (04 Sep 2022 18:00)      INTERVAL HPI/OVERNIGHT EVENTS:  Patient exam remains poor, pending [facility] meeting with family tomorrow 9/15/22.      Vital Signs Last 24 Hrs  T(C): 36.4 (13 Sep 2022 23:17), Max: 37.1 (13 Sep 2022 04:02)  T(F): 97.5 (13 Sep 2022 23:17), Max: 98.8 (13 Sep 2022 04:02)  HR: 71 (13 Sep 2022 23:00) (71 - 83)  BP: 98/61 (13 Sep 2022 23:00) (82/60 - 142/77)  BP(mean): 72 (13 Sep 2022 23:00) (65 - 96)  RR: 16 (13 Sep 2022 23:00) (15 - 26)  SpO2: 100% (13 Sep 2022 23:00) (98% - 100%)    Parameters below as of 13 Sep 2022 22:00  Patient On (Oxygen Delivery Method): ventilator    O2 Concentration (%): 30    PHYSICAL EXAM:  General: No agitation, intubated  HEENT: pupils 3mm and non reactive   biteblock in place  Neuro:  -Mental status- No motor to noxious, no dolls, no gag, no cough, no corneals, breathing over vent intermittently    LABS:                        11.3   7.61  )-----------( 254      ( 13 Sep 2022 17:55 )             35.7     09-13    133<L>  |  90<L>  |  75.3<H>  ----------------------------<  156<H>  5.2   |  26.0  |  5.91<H>    Ca    9.7      13 Sep 2022 17:55  Phos  6.1     09-13  Mg     2.7     09-12 09-12 @ 07:01 - 09-13 @ 07:00  --------------------------------------------------------  IN: 1280 mL / OUT: 317 mL / NET: 963 mL    09-13 @ 07:01  -  09-14 @ 00:36  --------------------------------------------------------  IN: 690 mL / OUT: 275 mL / NET: 415 mL        RADIOLOGY & ADDITIONAL TESTS:    < from: CT Head No Cont (09.05.22 @ 04:44) >    IMPRESSION:  A right frontal approach external ventricular drain terminates in third   ventricle.  Mild postprocedural pneumocephalus.    Severe intraventricular hemorrhage with casting the ventricular system   and moderate hydrocephalus is stable.    New mild subarachnoid hemorrhage within the cerebral hemispheres and   sylvian fissures bilaterally probably reflects recirculation of   intraventricular blood products, rather than new hemorrhage.    There is suspicion for underlying left thalamic parenchymal hemorrhage,   which is difficult to distinguish from the intraventricular hemorrhage in   the adjacent third ventricle. Follow-up MRI may be obtained for further   evaluation.               HPI: Patient is a 62y old  Female who presents with a chief complaint of hypertensive crisis/ hyperglycemia ~600/ fever TMax: 100.7 w vomiting, who was declining clinically w lethargy since in ED, CTB was obtained and extensive B/L IVH casting 3rd and 4th vents were noted. pt has been intubated shortly after CT for poor exam/ agonal breaths/ GCS 4.  per d/w son, Feliciano, unable to reach Keshia, daughter/ HCP, full code and all measures to be done, R/B?A of EVD eplained alongside possibility of worsening hemorrhage and he understood and consent obtained via phone.    - LOC   - trauma reported   ?Headache/ unable to obtain from pt/ per ER RN pt did not endorse headache   + Nausea /+ Vomiting  pt was dropped off to ED by granddaughter     Mode: AC/ CMV (Assist Control/ Continuous Mandatory Ventilation)  RR (machine): 16  TV (machine): 0.4  FiO2: 50  PEEP: 5  PIP: 16    Vital Signs Last 24 Hrs  ICU Vital Signs Last 24 Hrs  T(C): 36.8 (15 Sep 2022 00:05), Max: 36.8 (15 Sep 2022 00:05)  T(F): 98.2 (15 Sep 2022 00:05), Max: 98.2 (15 Sep 2022 00:05)  HR: 78 (15 Sep 2022 02:00) (66 - 87)  BP: 84/57 (15 Sep 2022 02:00) (74/53 - 118/75)  BP(mean): 65 (15 Sep 2022 02:00) (59 - 89)  ABP: --  ABP(mean): --  RR: 17 (15 Sep 2022 02:00) (12 - 22)  SpO2: 100% (15 Sep 2022 02:00) (97% - 100%)    O2 Parameters below as of 15 Sep 2022 00:00  Patient On (Oxygen Delivery Method): ventilator    O2 Concentration (%): 30      INTERVAL HPI/OVERNIGHT EVENTS:  Patient exam remains poor. Family meeting held yesterday 9/14, w [facility] attending, PAs and family members. Family stated they know the patient would not want to live in this way and are in agreement to compassionately extubate at some point this week when family members have had an opportunity to say goodbye. Family understands that patient has low blood pressure and that we would not be treating this any longer. They opted for DNR/DNI status. Furthermore after discussion with nephrologist, HD will no longer be offered to patient in setting of severe neurologic injury and poor neurologic exam as well as multiple comorbid states.            Vital Signs Last 24 Hrs  T(C): 36.4 (13 Sep 2022 23:17), Max: 37.1 (13 Sep 2022 04:02)  T(F): 97.5 (13 Sep 2022 23:17), Max: 98.8 (13 Sep 2022 04:02)  HR: 71 (13 Sep 2022 23:00) (71 - 83)  BP: 98/61 (13 Sep 2022 23:00) (82/60 - 142/77)  BP(mean): 72 (13 Sep 2022 23:00) (65 - 96)  RR: 16 (13 Sep 2022 23:00) (15 - 26)  SpO2: 100% (13 Sep 2022 23:00) (98% - 100%)    Parameters below as of 13 Sep 2022 22:00  Patient On (Oxygen Delivery Method): ventilator    O2 Concentration (%): 30    PHYSICAL EXAM:  General: No agitation, intubated  HEENT: pupils 3mm and non reactive   biteblock in place  Neuro:  -Mental status- No motor to noxious, no dolls, no gag, no cough, no corneals, breathing over vent intermittently    LABS:                        11.3   7.61  )-----------( 254      ( 13 Sep 2022 17:55 )             35.7     09-13    133<L>  |  90<L>  |  75.3<H>  ----------------------------<  156<H>  5.2   |  26.0  |  5.91<H>    Ca    9.7      13 Sep 2022 17:55  Phos  6.1     09-13  Mg     2.7     09-12 09-12 @ 07:01  -  09-13 @ 07:00  --------------------------------------------------------  IN: 1280 mL / OUT: 317 mL / NET: 963 mL    09-13 @ 07:01  -  09-14 @ 00:36  --------------------------------------------------------  IN: 690 mL / OUT: 275 mL / NET: 415 mL        RADIOLOGY & ADDITIONAL TESTS:    < from: CT Head No Cont (09.05.22 @ 04:44) >    IMPRESSION:  A right frontal approach external ventricular drain terminates in third   ventricle.  Mild postprocedural pneumocephalus.    Severe intraventricular hemorrhage with casting the ventricular system   and moderate hydrocephalus is stable.    New mild subarachnoid hemorrhage within the cerebral hemispheres and   sylvian fissures bilaterally probably reflects recirculation of   intraventricular blood products, rather than new hemorrhage.    There is suspicion for underlying left thalamic parenchymal hemorrhage,   which is difficult to distinguish from the intraventricular hemorrhage in   the adjacent third ventricle. Follow-up MRI may be obtained for further   evaluation.

## 2022-09-15 NOTE — PROGRESS NOTE ADULT - SUBJECTIVE AND OBJECTIVE BOX
CC:  Follow up GOC , Symptoms    OVERNIGHT EVENTS:  noted - family electing withdrawal of vent      Present Symptoms:   Dyspnea:  na on vent   Nausea/Vomiting:  No  Yes  Anxiety:  No   Yes    Depression:  No  Yes  Unable  Fatigue:  Yes   No   Unable  Loss of appetite:  No Yes   NA   Constipation: Not Reported   Yes    Pain:   No Signs            Character-            Duration-            Location-            Severity-    Pain AD Score:  [email or web address]/cog/[email or web address] (press ctrl + left click to view)    Review of Systems: Reviewed as above  Further ROS unable to  obtain due to poor mentation     MEDICATIONS  (STANDING):  artificial  tears Solution 1 Drop(s) Both EYES two times a day  atorvastatin 40 milliGRAM(s) Oral at bedtime  chlorhexidine 0.12% Liquid 15 milliLiter(s) Oral Mucosa every 12 hours  chlorhexidine 2% Cloths 1 Application(s) Topical daily  dextrose 5%. 1000 milliLiter(s) (100 mL/Hr) IV Continuous <Continuous>  dextrose 5%. 1000 milliLiter(s) (50 mL/Hr) IV Continuous <Continuous>  dextrose 50% Injectable 25 Gram(s) IV Push once  dextrose 50% Injectable 25 Gram(s) IV Push once  glucagon  Injectable 1 milliGRAM(s) IntraMuscular once  insulin glargine Injectable (LANTUS) 13 Unit(s) SubCutaneous at bedtime  levothyroxine 75 MICROGram(s) Enteral Tube daily  metoprolol tartrate 25 milliGRAM(s) Oral two times a day  pantoprazole   Suspension 40 milliGRAM(s) Oral before breakfast    MEDICATIONS  (PRN):  acetaminophen     Tablet .. 650 milliGRAM(s) Oral every 6 hours PRN Temp greater or equal to 38C (100.4F), Mild Pain (1 - 3)  ondansetron Injectable 4 milliGRAM(s) IV Push every 6 hours PRN Nausea and/or Vomiting  sodium chloride 0.9% Bolus. 100 milliLiter(s) IV Bolus every 5 minutes PRN SBP LESS THAN or EQUAL to 90 mmHg      PHYSICAL EXAM:    Vital Signs Last 24 Hrs  T(C): 36.4 (15 Sep 2022 08:00), Max: 36.8 (15 Sep 2022 00:05)  T(F): 97.5 (15 Sep 2022 08:00), Max: 98.2 (15 Sep 2022 00:05)  HR: 72 (15 Sep 2022 10:00) (68 - 87)  BP: 88/51 (15 Sep 2022 10:00) (74/53 - 96/58)  BP(mean): 61 (15 Sep 2022 10:00) (58 - 70)  RR: 12 (15 Sep 2022 10:00) (12 - 22)  SpO2: 100% (15 Sep 2022 10:00) (97% - 100%)    Parameters below as of 15 Sep 2022 04:00  Patient On (Oxygen Delivery Method): ventilator    O2 Concentration (%): 30    Karnofsky:  10%  General:  F NAD non responsive    HEENT:  NCAT     ( x )  ET tube    Lungs: comfortable  non labored  CV:  RR  GI:  soft NTND  MSK: weak bedbound  Skin:  warm/dry  Neuro  non responsive    LABS:                          11.3   7.61  )-----------( 254      ( 13 Sep 2022 17:55 )             35.7     09-13    133<L>  |  90<L>  |  75.3<H>  ----------------------------<  156<H>  5.2   |  26.0  |  5.91<H>    Ca    9.7      13 Sep 2022 17:55  Phos  6.1     09-13          I&O's Summary    14 Sep 2022 07:01  -  15 Sep 2022 07:00  --------------------------------------------------------  IN: 1010 mL / OUT: 378 mL / NET: 632 mL    15 Sep 2022 07:01  -  15 Sep 2022 11:27  --------------------------------------------------------  IN: 120 mL / OUT: 11 mL / NET: 109 mL        RADIOLOGY & ADDITIONAL STUDIES:        ADVANCE DIRECTIVES/TREATMENT PREFERENCES:

## 2022-09-15 NOTE — PROGRESS NOTE ADULT - THIS PATIENT HAS THE FOLLOWING CONDITION(S)/DIAGNOSES ON THIS ADMISSION:
Cerebral Edema/Brain Compression / Herniation
Encephalopathy/Brain Compression / Herniation
Renal Disease/Cerebral Edema/Brain Compression / Herniation/Acute Respiratory Failure
Cerebral Edema/Brain Compression / Herniation
Cerebral Edema/Brain Compression / Herniation
None
Brain Compression / Herniation
Brain Compression / Herniation/Acute Respiratory Failure
Heart Failure/Renal Disease/Encephalopathy/Acute Respiratory Failure
None
None
Cerebral Edema/Brain Compression / Herniation/Acute Respiratory Failure
Encephalopathy
Encephalopathy/Brain Compression / Herniation
None
Renal Disease/Acute Respiratory Failure
Encephalopathy
Heart Failure/Renal Disease/Encephalopathy/Acute Respiratory Failure

## 2022-09-15 NOTE — PROGRESS NOTE ADULT - ASSESSMENT
62yr old  woman with Past MHX   ESRD on HD, OP, CHF, DM, PAF  hypertension, hyperlipidemia. anemia,  cirrhosis admitted with IVH with poor neurological exam    IVH  poor neurological status  family electing withdrawal fo vent      Respiratory Failure  cont O2 support  family electing withdrawal fo vent      ESRD  HD per renal  avoid nephrotoxic agents  given family electing vent withdrawal tomorrow, could hold off further HD to lessen patient burden with procedure    Encounter for Palliative Care  Informed by NSICU - family electing vent withdrawal tomorrow   Consider d/c OGT, no further blood draws to lessen patient burden.   62yr old  woman with Past MHX   ESRD on HD, OP, CHF, DM, PAF  hypertension, hyperlipidemia. anemia,  cirrhosis admitted with IVH with poor neurological exam    IVH  poor neurological status  family electing withdrawal fo vent      Respiratory Failure  cont O2 support  family electing withdrawal fo vent      ESRD  HD per renal  avoid nephrotoxic agents  given family electing vent withdrawal tomorrow, could hold off further HD to lessen patient burden with procedure    Encounter for Palliative Care  Informed by NSICU - family electing vent withdrawal tomorrow   Consider d/c OGT, no further blood draws to lessen patient burden.  Spoke to daughter Keshia with -  affirms decision for withdrawal,  She and family will be at hospital tomorrow at 1pm  Psychosocial support.

## 2022-09-15 NOTE — PROGRESS NOTE ADULT - CONVERSATION DETAILS
Spoke to daughter Keshia and updated her on mother's condition.  She states she and her family are hoping that she will wake up and if not, then God will take her .  Informed her given mother's  brain injury, very unlikely to recover and outlook is poor.  Informed her, mother is on a  vent, life support, which is currently keeping her alive.  Inquired as to what mother would want if required long term support.  She states she will speak to the rest of the family and discuss " unplugging"
Spoke with daughter Keshia with  [id number]  She states she met with HARLEEN yesterday.  She acknowledged decision to not to further burden her mother with further interventions and wish her to pass peacefully.  They will come tomorrow with the rest of the family at 1pm.   Plan for vent withdrawal.
Spoke to daughter Keshia.  She states she is the HCP but also needs the input of her father for any decisions. Keshia states she spoke to a doctor last night and informed her how mother is in a "vegetative" state and discussed a feeding tube.    She states she would want to leave things in God's hands.   Informed her mother's mental state has  not improved, overall outlook is not good.  Discussed advance directives CPR.  She would like to discuss with father and rest of family.
Spoke to daughter Keshia. She states she and the family are hoping mother will " wake up".  Informed her again that unfortunately given her level of injury, do not expect her to wake up, and neurological outlook is poor. Discussed possible trach/peg as patient cannot be long term with an ET tube.  Keshia states she has to speak to her father and the rest of the family.   Offered family meeting.

## 2022-09-15 NOTE — PROGRESS NOTE ADULT - NS PANP COMMENT GEN_ALL_CORE FT
Neurosurgery Attending Attestation:    Patient seen and examined at bedside. Agree with plan and note as documented above.    62-year-old woman with past medical history significant for hypertension, hyperlipidemia, ESRD on HD, OP, CHF, DM, paroxysmal A. fib, hypothyroid, anemia of chronic disease, cirrhosis, and recent admission for pneumonia who presents with acute onset loss of consciousness. CT head without contrast demonstrates likely left basal ganglia ICH with extensive IVH with casting of the right, third, and fourth ventricles.  Exam remains poor with no corneal reflexes, fixed pupils, no cough/gag or movement to stim. Continue goals of care discussion with family vs formal brain death testing.    -Marlene King MD.

## 2022-09-15 NOTE — PROGRESS NOTE ADULT - ASSESSMENT
Assessment  61yo F with extensive medical problems, p/w hemorrhagic stroke, IVH/hydrocephalus  brain compression requiring EVD placement  acute hypoxic respiratory failure, aspiration pneumonia   Severe hyperglycemia - resolved  hyponatremia resolved    Now PBD 11    Plan  - neuro checks q4hr  - EVD at 0, keep ICP <22. EVD is intermittently working,   - maintain normothermia, tylenol prn  - nephrology following, pt is on HD, had HD yesterday 9/13   - completed course of Zosyn for aspiration pneumonia  - SCDs for DVT prophylaxis  - poor prognosis  - cont lantus & SS  - cont synthroid  - Family meeting held yesterday 9/14, w NCCU attending, PAs and family members. Family stated they know the patient would not want to live in this way and are in agreement to compassionately extubate at some point this week when family members have had an opportunity to say goodbye. Family understands that patient has low blood pressure and that we would not be treating this any longer. They opted for DNR/DNI status. Furthermore after discussion with nephrologist, HD will no longer be offered to patient in setting of severe neurologic injury and poor neurologic exam as well as multiple comorbid states.         - cont to provide support to family, Palliative is following  - Further plan pending am rounds with attending

## 2022-09-15 NOTE — PROGRESS NOTE ADULT - ASSESSMENT
61 yo F with multiple co-morbidities, presented with large IVH/ obstructive hydrocephalus, concern for L BG ICH, score 3. S/p emergent EVD placement.  Nonocclusive thrombus in the proximal right basilar artery. Occluded left vertebral artery distal V4 segment.  H/o CAD on ASA; Afib not on AC due to GIB. Mixed CHF.  Acute resp failure. PNA.  Cirrhosis with moderate ascites. S/p paracentesis 9/5.  ESRD on HD.  Hyperglycemia, DM.    NEURO:   cont q4h neurochecks, off sedation, PRN fentanyl 25 for agitation/vent   maintain EVD @0, monitor outp and ICP (118cc in 24hr)  LIVE On contacted   pain mgt: tylenol and oxy 5 prn  - GOC discussion with the family members  9/14/22- family expressed their question pertaining to their loved opnes clinical condition and what her wishes would be like.  THey realize the chance for any neurologic recovery is 0.  We emphasized comfort and patient is not suffering.  THey agree to terminal extubation 1 pm on 9/16/22  Activity:  [X] Bedrest     PULM: vent support, adjust as needed,  SpO2>92%      CV: Hypotension ;   No escalation of BP  statin    RENAL: monitor I/O  HD 9/10 0.6L removed, Renal involvement appreciated   anuric  D/C dialysis    GI: Diarrhea  TF- BM regimen, PPI for ppx  rectal tube- liquid stools  PPI  banatrol      ENDO:   Goal euglycemia (-180)  SSI for now, monitor FS  lantus 13units daily  synthroid    HEME/ONC:  VTE prophylaxis: [] SCDs [x] chemoprophylaxis heparin  SCDs, no AC in view of brain bleed    ID: afebrile/ S/P PNA    no leukocytosis  cultures NGTD    MISC:    I affirm that this patient is critically ill and at high risk for sudden, fatal deterioration due to one or more of the above stated active issues.     This time does not include bedside procedures that are documented separately.

## 2022-09-15 NOTE — PROGRESS NOTE ADULT - SUBJECTIVE AND OBJECTIVE BOX
Chief complaint:   Patient is a 62y old  Female who presents with a chief complaint of severe IVH  + ASA 81m PTA (11 Sep 2022 00:28)    HPI:  Patient is a 62y old  Female who presents with a chief complaint of hypertensive crisis/ hyperglycemia ~600/ fever TMax: 100.7 w vomiting, who was declining clinically w lethargy since in ED, CTB was obtained and extensive B/L IVH casting 3rd and 4th vents were noted. pt has been intubated shortly after CT for poor exam/ agonal breaths/ GCS 4.  per d/w son, Feliciano, unable to reach Keshia, daughter/ HCP, full code and all measures to be done, R/B?A of EVD eplained alongside possibility of worsening hemorrhage and he understood and consent obtained via phone.    - LOC   - trauma reported   ?Headache/ unable to obtain from pt/ per ER RN pt did not endorse headache   + Nausea /+ Vomiting  pt was dropped off to ED by granddaughter     24hr EVENTS: No events   Last Dialysis 9/13  S/P treatment PNA      ROS: [x ]  Unable to assess due to mental status   All other systems negative    ICU Vital Signs Last 24 Hrs  T(C): 36.6 (15 Sep 2022 04:26), Max: 36.8 (15 Sep 2022 00:05)  T(F): 97.9 (15 Sep 2022 04:26), Max: 98.2 (15 Sep 2022 00:05)  HR: 77 (15 Sep 2022 07:00) (68 - 87)  BP: 82/53 (15 Sep 2022 07:00) (74/53 - 118/75)  BP(mean): 63 (15 Sep 2022 07:00) (59 - 89)  RR: 13 (15 Sep 2022 07:00) (12 - 22)  SpO2: 99% (15 Sep 2022 07:00) (97% - 100%)    O2 Parameters below as of 15 Sep 2022 04:00  Patient On (Oxygen Delivery Method): ventilator    O2 Concentration (%): 30        14 Sep 2022 07:01  -  15 Sep 2022 07:00  --------------------------------------------------------  IN:    Enteral Tube Flush: 50 mL    Nepro: 960 mL  Total IN: 1010 mL    OUT:    External Ventricular Device (mL): 128 mL    Rectal Tube (mL): 250 mL    Voided (mL): 0 mL  Total OUT: 378 mL    Total NET: 632 mL          MEDICATIONS  (STANDING):  artificial  tears Solution 1 Drop(s) Both EYES two times a day  atorvastatin 40 milliGRAM(s) Oral at bedtime  chlorhexidine 0.12% Liquid 15 milliLiter(s) Oral Mucosa every 12 hours  chlorhexidine 2% Cloths 1 Application(s) Topical daily  dextrose 5%. 1000 milliLiter(s) (100 mL/Hr) IV Continuous <Continuous>  dextrose 5%. 1000 milliLiter(s) (50 mL/Hr) IV Continuous <Continuous>  dextrose 50% Injectable 25 Gram(s) IV Push once  dextrose 50% Injectable 12.5 Gram(s) IV Push once  dextrose 50% Injectable 25 Gram(s) IV Push once  glucagon  Injectable 1 milliGRAM(s) IntraMuscular once  insulin glargine Injectable (LANTUS) 13 Unit(s) SubCutaneous at bedtime  insulin lispro (ADMELOG) corrective regimen sliding scale   SubCutaneous every 6 hours  levothyroxine 75 MICROGram(s) Enteral Tube daily  metoprolol tartrate 25 milliGRAM(s) Oral two times a day  pantoprazole   Suspension 40 milliGRAM(s) Oral before breakfast    MEDICATIONS  (PRN):  acetaminophen     Tablet .. 650 milliGRAM(s) Oral every 6 hours PRN Temp greater or equal to 38C (100.4F), Mild Pain (1 - 3)  dextrose Oral Gel 15 Gram(s) Oral once PRN Blood Glucose LESS THAN 70 milliGRAM(s)/deciliter  ondansetron Injectable 4 milliGRAM(s) IV Push every 6 hours PRN Nausea and/or Vomiting  sodium chloride 0.9% Bolus. 100 milliLiter(s) IV Bolus every 5 minutes PRN SBP LESS THAN or EQUAL to 90 mmHg        RESPIRATORY:  Mode: AC/ CMV (Assist Control/ Continuous Mandatory Ventilation)  RR (machine): 12  TV (machine): 380  FiO2: 30  PEEP: 5  MAP: 12  PIP: 29    IMAGING:   Recent imaging studies were reviewed.    CT Head No Cont (09.05.22 @ 04:44) >  IMPRESSION:  A right frontal approach external ventricular drain terminates in third   ventricle.  Mild postprocedural pneumocephalus.    Severe intraventricular hemorrhage with casting the ventricular system   and moderate hydrocephalus is stable.    New mild subarachnoid hemorrhage within the cerebral hemispheres and   sylvian fissures bilaterally probably reflects recirculation of   intraventricular blood products, rather than new hemorrhage.    There is suspicion for underlying left thalamic parenchymal hemorrhage,   which is difficult to distinguish from the intraventricular hemorrhage in   the adjacent third ventricle. Follow-up MRI may be obtained for further      TTE Echo Complete w/o Contrast w/ Doppler (09.05.22 @ 09:02) >    Summary:   1. Normal left ventricular internal cavity size.   2. There is mild concentric left ventricular hypertrophy.   3. Normal global left ventricular systolic function.   4. Left ventricular ejection fraction, by visual estimation, is 55 to   60%.   5. Spectral Doppler shows pseudonormal pattern of left ventricular   myocardial filling (Grade II diastolic dysfunction).   6. Normal right ventricular size and function.   7. Mildly enlarged left atrium.   8. Mildly enlarged right atrium.   9. Mild mitral annular calcification.  10. Mild mitral valve regurgitation.  11. Mild thickening and calcification of the anterior and posterior   mitral valve leaflets.  12. Mild-moderate tricuspid regurgitation.  13. Normal trileaflet aortic valve with normal opening.  14. Small mobile echogenicity visualized on the aortic valve, as well as   questionable lambl's excrescence.  15. Estimated pulmonary artery systolic pressure is 37.6 mmHg assuming a   right atrial pressure of 8 mmHg, which is consistent with borderline  pulmonary hypertension.  16. Mildly dilated pulmonary artery.  17. Mobile intra-atrial septum.  18. Color flow doppler and intravenous injection of agitated saline   demonstrates the presence of an intact intra atrial septum.  19. There is no evidence of pericardial effusion.        LAB RESULTS:                          11.3   7.61  )-----------( 254      ( 13 Sep 2022 17:55 )             35.7       09-13    133<L>  |  90<L>  |  75.3<H>  ----------------------------<  156<H>  5.2   |  26.0  |  5.91<H>    Ca    9.7      13 Sep 2022 17:55  Phos  6.1     09-13        PHYSICAL EXAM:  General: Calm, intubated  HEENT: pupils 3mm and non reactive OVAL  biteblock in place, Tongue - bloody with scab and medication ( Toothette )  Neuro:  -Mental status- No grimacing to noxious , no dolls, no gaga, no cough, no corneals , breaths over vent settingf  flaccid in all ext    CV: RRR  Pulm: coarse breath sounds to auscultation  Abd: Soft, nontender, nondistended  Ext: no noted edema in lower ext  Skin: warm, dry    copious oral, minimal midline secretions

## 2022-09-15 NOTE — PROGRESS NOTE ADULT - SUBJECTIVE AND OBJECTIVE BOX
Interim noted   Palliative Care follow up noted   Family has decided to go forward with palliative extubation   No further dialysis   Will sign off   Recontact if needed

## 2022-09-15 NOTE — PROGRESS NOTE ADULT - NS ATTEND BILL GEN_ALL_CORE
Attending to bill
PA/NP to bill
Attending to bill
Attending to bill

## 2022-09-16 NOTE — PROGRESS NOTE ADULT - CRITICAL CARE ATTENDING COMMENT
Pt is critically ill and at high risk of rapid deterioration/death due to abovementioned conditions.   Still requires critical care interventions - ongoing frequent evaluations, interventions and management adjustment by the Attending and ICU team, - and included review of relevant history, clinical examination, review of data and images, discussion of treatment with the multidisciplinary team and any consultants involved in this patient’s care as well as family members.
I spent 40 minutes of critical care time examining patient, reviewing vitals, labs, medications, imaging and discussing with the team goals of care to prevent life-threatening in this patient who is at high risk for deterioration or death due to:  ICH
I spent 40 minutes of critical care time examining patient, reviewing vitals, labs, medications, imaging and discussing with the team goals of care to prevent life-threatening in this patient who is at high risk for deterioration or death due to:  ICH
Pt is critically ill and at high risk of rapid deterioration/death due to abovementioned conditions.   Still requires critical care interventions - ongoing frequent evaluations, interventions and management adjustment by the Attending and ICU team, - and included review of relevant history, clinical examination, review of data and images, discussion of treatment with the multidisciplinary team and any consultants involved in this patient’s care.
I spent 40 minutes of critical care time examining patient, reviewing vitals, labs, medications, imaging and discussing with the team goals of care to prevent life-threatening in this patient who is at high risk for deterioration or death due to:  ICH

## 2022-09-16 NOTE — PROGRESS NOTE ADULT - SUBJECTIVE AND OBJECTIVE BOX
[facility] PALLIATIVE MEDICINE     CC: FOLLOW UP VISIT + GOC    INTERVAL HPI/OVERNIGHT EVENTS:  Source if other than patient:  []Family   [x]Team     Seen with language line  Rashawn #[id number]  Multiple family members at bedside with .   Family reaffirmed plan for comfort measures and palliative extubation and will let staff know when they are ready to proceed. They had no questions at this time. Emotional support provided.     PRESENT SYMPTOMS:     Dyspnea: intubated  Nausea/Vomiting:  No  Anxiety:   No  Depression: unable  Fatigue: Yes    Loss of appetite: NPO  Constipation: No    Pain: No            Character-            Duration-            Effect-            Factors-            Frequency-            Location-            Severity-    Pain AD Score:  [email or web address] (press ctrl + left click to view)    Review of Systems: Unable to obtain due to poor mentation/encephalopathy     MEDICATIONS  (STANDING):  artificial  tears Solution 1 Drop(s) Both EYES two times a day  chlorhexidine 0.12% Liquid 15 milliLiter(s) Oral Mucosa every 12 hours  chlorhexidine 2% Cloths 1 Application(s) Topical daily  HYDROmorphone  Injectable 0.5 milliGRAM(s) IV Push every 6 hours  HYDROmorphone  Injectable 1 milliGRAM(s) IV Push once  pantoprazole   Suspension 40 milliGRAM(s) Oral before breakfast    MEDICATIONS  (PRN):  acetaminophen     Tablet .. 650 milliGRAM(s) Oral every 6 hours PRN Temp greater or equal to 38C (100.4F), Mild Pain (1 - 3)  glycopyrrolate Injectable 0.4 milliGRAM(s) IV Push every 4 hours PRN secretions  HYDROmorphone  Injectable 0.5 milliGRAM(s) IV Push every 3 hours PRN pain/comfort care  LORazepam   Injectable 0.5 milliGRAM(s) IV Push every 4 hours PRN Anxiety  ondansetron Injectable 4 milliGRAM(s) IV Push every 6 hours PRN Nausea and/or Vomiting    PHYSICAL EXAM:    Vital Signs Last 24 Hrs  T(C): 36.4 (16 Sep 2022 08:00), Max: 36.8 (15 Sep 2022 23:48)  T(F): 97.6 (16 Sep 2022 08:00), Max: 98.3 (15 Sep 2022 23:48)  HR: 73 (16 Sep 2022 13:00) (69 - 82)  BP: 87/53 (16 Sep 2022 13:00) (75/47 - 115/65)  BP(mean): 63 (16 Sep 2022 13:00) (56 - 79)  RR: 18 (16 Sep 2022 13:00) (12 - 19)  SpO2: 100% (16 Sep 2022 13:00) (100% - 100%)    Parameters below as of 16 Sep 2022 13:00  Patient On (Oxygen Delivery Method): ventilator    O2 Concentration (%): 30     Karnofsky:  10%    GEN: frail. resting comfortably and no acute distress    HEENT: mucous membrane moist  +ETT    Lungs: comfortable, nonlabored     CV: +s1/s2 regular rate and rhythm       GI: +BS, abdomen soft, nontender, nondistended      :   farley    MSK:  no cyanosis or edema +bedbound    NEURO: nonfocal. unresponsive nonverbal    Skin: warm and dry.       LABS: reviewed     I&O's Summary    15 Sep 2022 07:01  -  16 Sep 2022 07:00  --------------------------------------------------------  IN: 930 mL / OUT: 449 mL / NET: 481 mL    16 Sep 2022 07:01  -  16 Sep 2022 14:04  --------------------------------------------------------  IN: 120 mL / OUT: 33 mL / NET: 87 mL    RADIOLOGY & ADDITIONAL STUDIES: Reviewed     ADVANCE DIRECTIVES/TREATMENT PREFERENCES: DNR

## 2022-09-16 NOTE — PROGRESS NOTE ADULT - ASSESSMENT
61 yo F with multiple co-morbidities, presented with large IVH/ obstructive hydrocephalus, concern for L BG ICH, score 3. S/p emergent EVD placement.  Nonocclusive thrombus in the proximal right basilar artery. Occluded left vertebral artery distal V4 segment.  H/o CAD on ASA; Afib not on AC due to GIB. Mixed CHF.  Acute resp failure. PNA.  Cirrhosis with moderate ascites. S/p paracentesis 9/5.  ESRD on HD.  Hyperglycemia, DM.    NEURO:   cont q4h neurochecks, off sedation, PRN fentanyl 25 for agitation/vent   maintain EVD @0, monitor outp and ICP (118cc in 24hr)  LIVE On contacted   pain mgt: tylenol and oxy 5 prn  - GOC discussion with the family members  9/14/22- family expressed their question pertaining to their loved opnes clinical condition and what her wishes would be like.  THey realize the chance for any neurologic recovery is 0.  We emphasized comfort and patient is not suffering.  THey agree to terminal extubation 1 pm on 9/16/22  Palliative care informed to contact family for compassionate extubation today  Dilaudid 1mg x1 then Dilaudid 0.5 q 3 + glycopyrollate 0.4 mg q4 prn   Activity:  [X] Bedrest     PULM: vent support, adjust as needed, intermittently breathing spont   SpO2>92%      CV: Hypotension ;   No escalation of BP  statin    RENAL: monitor I/O  HD 9/10 0.6L removed, Renal involvement appreciated   anuric  D/C dialysis    GI:  S/P Diarrhea  D/C feeds for extubation   TF- BM regimen, PPI for ppx      ENDO:   Goal euglycemia (-180)  SSI for now, monitor FS    HEME/ONC:  VTE prophylaxis: [] SCDs [x] chemoprophylaxis heparin  SCDs, no AC in view of brain bleed    ID: afebrile/ S/P PNA    no leukocytosis  cultures NGTD    MISC:    I affirm that this patient is critically ill and at high risk for sudden, fatal deterioration due to one or more of the above stated active issues.     This time does not include bedside procedures that are documented separately.

## 2022-09-16 NOTE — CHART NOTE - NSCHARTNOTEFT_GEN_A_CORE
In light of recent family GOC meeting and plans to compassionately extubate later today, neurosurgery will sign off at this time. Please re-consult with any further questions or concerns.

## 2022-09-16 NOTE — PROGRESS NOTE ADULT - SUBJECTIVE AND OBJECTIVE BOX
Chief complaint:   Patient is a 62y old  Female who presents with a chief complaint of severe IVH  + ASA 81m PTA (11 Sep 2022 00:28)    HPI:  Patient is a 62y old  Female who presents with a chief complaint of hypertensive crisis/ hyperglycemia ~600/ fever TMax: 100.7 w vomiting, who was declining clinically w lethargy since in ED, CTB was obtained and extensive B/L IVH casting 3rd and 4th vents were noted. pt has been intubated shortly after CT for poor exam/ agonal breaths/ GCS 4.  per d/w son, Feliciano, unable to reach Keshia, daughter/ HCP, full code and all measures to be done, R/B?A of EVD eplained alongside possibility of worsening hemorrhage and he understood and consent obtained via phone.    - LOC   - trauma reported   ?Headache/ unable to obtain from pt/ per ER RN pt did not endorse headache   + Nausea /+ Vomiting  pt was dropped off to ED by granddaughter     24hr EVENTS: hypotension   Last Dialysis 9/13  S/P treatment PNA      ROS: [x ]  Unable to assess due to mental status   All other systems negative    ICU Vital Signs Last 24 Hrs  T(C): 36.4 (16 Sep 2022 08:00), Max: 36.8 (15 Sep 2022 23:48)  T(F): 97.6 (16 Sep 2022 08:00), Max: 98.3 (15 Sep 2022 23:48)  HR: 74 (16 Sep 2022 09:00) (72 - 82)  BP: 75/47 (16 Sep 2022 09:00) (75/47 - 115/65)  BP(mean): 56 (16 Sep 2022 09:00) (56 - 79)  RR: 18 (16 Sep 2022 09:00) (12 - 24)  SpO2: 100% (16 Sep 2022 09:00) (99% - 100%)    O2 Parameters below as of 16 Sep 2022 09:00  Patient On (Oxygen Delivery Method): ventilator      15 Sep 2022 07:01  -  16 Sep 2022 07:00  --------------------------------------------------------  IN:    Enteral Tube Flush: 50 mL    Nepro: 880 mL  Total IN: 930 mL    OUT:    External Ventricular Device (mL): 99 mL    Rectal Tube (mL): 350 mL    Voided (mL): 0 mL  Total OUT: 449 mL    Total NET: 481 mL      16 Sep 2022 07:01  -  16 Sep 2022 10:31  --------------------------------------------------------  IN:    Nepro: 80 mL  Total IN: 80 mL    OUT:    External Ventricular Device (mL): 16 mL  Total OUT: 16 mL    Total NET: 64 mL    MEDICATIONS  (STANDING):  artificial  tears Solution 1 Drop(s) Both EYES two times a day  chlorhexidine 0.12% Liquid 15 milliLiter(s) Oral Mucosa every 12 hours  chlorhexidine 2% Cloths 1 Application(s) Topical daily  levothyroxine 75 MICROGram(s) Enteral Tube daily  pantoprazole   Suspension 40 milliGRAM(s) Oral before breakfast    MEDICATIONS  (PRN):  acetaminophen     Tablet .. 650 milliGRAM(s) Oral every 6 hours PRN Temp greater or equal to 38C (100.4F), Mild Pain (1 - 3)  ondansetron Injectable 4 milliGRAM(s) IV Push every 6 hours PRN Nausea and/or Vomiting          RESPIRATORY:  Mode: AC/ CMV (Assist Control/ Continuous Mandatory Ventilation)  RR (machine): 12  TV (machine): 380  FiO2: 30  PEEP: 5  MAP: 12  PIP: 29    IMAGING:   Recent imaging studies were reviewed.    CT Head No Cont (09.05.22 @ 04:44) >  IMPRESSION:  A right frontal approach external ventricular drain terminates in third   ventricle.  Mild postprocedural pneumocephalus.    Severe intraventricular hemorrhage with casting the ventricular system   and moderate hydrocephalus is stable.    New mild subarachnoid hemorrhage within the cerebral hemispheres and   sylvian fissures bilaterally probably reflects recirculation of   intraventricular blood products, rather than new hemorrhage.    There is suspicion for underlying left thalamic parenchymal hemorrhage,   which is difficult to distinguish from the intraventricular hemorrhage in   the adjacent third ventricle. Follow-up MRI may be obtained for further      TTE Echo Complete w/o Contrast w/ Doppler (09.05.22 @ 09:02) >    Summary:   1. Normal left ventricular internal cavity size.   2. There is mild concentric left ventricular hypertrophy.   3. Normal global left ventricular systolic function.   4. Left ventricular ejection fraction, by visual estimation, is 55 to   60%.   5. Spectral Doppler shows pseudonormal pattern of left ventricular   myocardial filling (Grade II diastolic dysfunction).   6. Normal right ventricular size and function.   7. Mildly enlarged left atrium.   8. Mildly enlarged right atrium.   9. Mild mitral annular calcification.  10. Mild mitral valve regurgitation.  11. Mild thickening and calcification of the anterior and posterior   mitral valve leaflets.  12. Mild-moderate tricuspid regurgitation.  13. Normal trileaflet aortic valve with normal opening.  14. Small mobile echogenicity visualized on the aortic valve, as well as   questionable lambl's excrescence.  15. Estimated pulmonary artery systolic pressure is 37.6 mmHg assuming a   right atrial pressure of 8 mmHg, which is consistent with borderline  pulmonary hypertension.  16. Mildly dilated pulmonary artery.  17. Mobile intra-atrial septum.  18. Color flow doppler and intravenous injection of agitated saline   demonstrates the presence of an intact intra atrial septum.  19. There is no evidence of pericardial effusion.        LAB RESULTS:                          11.3   7.61  )-----------( 254      ( 13 Sep 2022 17:55 )             35.7       09-13    133<L>  |  90<L>  |  75.3<H>  ----------------------------<  156<H>  5.2   |  26.0  |  5.91<H>    Ca    9.7      13 Sep 2022 17:55  Phos  6.1     09-13        PHYSICAL EXAM:  General: Calm, intubated  HEENT: pupils 3mm and non reactive OVAL  biteblock in place, Tongue - bloody with scab and medication ( Toothette )  Neuro:  -Mental status- No grimacing to noxious , no dolls, no gaga, no cough, no corneals , breaths over vent settingf  flaccid in all ext    CV: RRR  Pulm: coarse breath sounds to auscultation  Abd: Soft, nontender, nondistended  Ext: no noted edema in lower ext  Skin: warm, dry    copious oral, minimal midline secretions

## 2022-09-16 NOTE — DISCHARGE NOTE FOR THE EXPIRED PATIENT - HOSPITAL COURSE
62 year old female with HTN, HLD, ESRD on HD, CHF, DM, A.fib, hypothyroidism, cirrhosis with recurrent ascites was admitted to [facility] on 9/4 with extensive IVH. She was admitted on presentation of airway protection, received DDAVP and Plts for presumed platelet dysfunction and an EVD was placed. Her neurologic status remained poor and her overall prognosis remained poor, goals of care discussions were ongoing throughout her length of stay and her family elected to withdraw care on 9/16. She was compassionately extubated with family at bedside.

## 2022-09-16 NOTE — PROGRESS NOTE ADULT - ASSESSMENT
62F hx of ESRD on HD, OP, CHF, DM, PAF HTN, HLD, cirrhosis, anemia admitted with IVH with poor prognosis for meaningful neurological recovery. Multiple GOC and family planned for transition to full comfort measures and palliative extubation today.     IVH  - poor prognosis for meaningful functional recovery   - per goc, family planning for comfort measures and palliative vent withdrawal     Acute Respiratory Failure  - remains intubated on vent support  - family electing withdrawal fo vent    ESRD  - HD per renal  - avoid nephrotoxic agents  - given family electing vent withdrawal tomorrow, could hold off further HD to lessen patient burden with procedure    Encounter for Palliative Care  Met with family at bedside with [place] language line  to offer emotional support and answer any questions they may have. They have no questions at this time. Family to inform staff when they are ready to proceed forward.     D/W NSICU Dr Carl and PA regarding recommendations for symptom management when family ready to proceed with liberation off vent support. Palliative team will continue to support and follow along.

## 2022-09-16 NOTE — PROGRESS NOTE ADULT - REASON FOR ADMISSION
Hemorrhagic Stroke
severe IVH  + ASA 81m PTA
Hemorrhagic stroke
severe IVH
severe IVH  + ASA 81m PTA
Hemorrhagic stroke
severe IVH
severe IVH  + ASA 81m PTA
severe IVH
severe IVH  + ASA 81m PTA
Hemorrhagic stroke
severe IVH  + ASA 81m PTA

## 2022-09-19 LAB — GLUCOSE BLDC GLUCOMTR-MCNC: 77 MG/DL — SIGNIFICANT CHANGE UP (ref 70–99)

## 2022-09-27 NOTE — CHART NOTE - NSCHARTNOTESELECT_GEN_ALL_CORE
EVD not working/Event Note
Event Note
Event Note
Family meeting/Event Note
Medical Necessity/Event Note
Event Note
Nutrition Services

## 2022-09-27 NOTE — CHART NOTE - NSCHARTNOTEFT_GEN_A_CORE
Palliative care social work note.    Bereavement call made to patients  daughter Keshia. Support and resources offered.

## 2022-10-06 ENCOUNTER — APPOINTMENT (OUTPATIENT)
Dept: GASTROENTEROLOGY | Facility: CLINIC | Age: 62
End: 2022-10-06

## 2022-10-21 NOTE — ED POST DISCHARGE NOTE - CERTIFIED LETTER SENT
Yes Detail Level: Detailed Depth Of Biopsy: dermis Was A Bandage Applied: Yes Size Of Lesion In Cm: 0 Biopsy Type: H and E Biopsy Method: Dermablade Anesthesia Type: 1% lidocaine with epinephrine Anesthesia Volume In Cc: 0.5 Hemostasis: Drysol Wound Care: Petrolatum Dressing: bandage Destruction After The Procedure: No Type Of Destruction Used: Curettage Curettage Text: The wound bed was treated with curettage after the biopsy was performed. Cryotherapy Text: The wound bed was treated with cryotherapy after the biopsy was performed. Electrodesiccation Text: The wound bed was treated with electrodesiccation after the biopsy was performed. Electrodesiccation And Curettage Text: The wound bed was treated with electrodesiccation and curettage after the biopsy was performed. Silver Nitrate Text: The wound bed was treated with silver nitrate after the biopsy was performed. Lab: [id number] Lab Facility: [id number] Consent: Written consent was obtained and risks were reviewed including but not limited to scarring, infection, bleeding, scabbing, incomplete removal, nerve damage and allergy to anesthesia. Post-Care Instructions: I reviewed with the patient in detail post-care instructions. Patient is to keep the biopsy site dry overnight, and then apply bacitracin twice daily until healed. Patient may apply hydrogen peroxide soaks to remove any crusting. Notification Instructions: Patient will be notified of biopsy results. However, patient instructed to call the office if not contacted within 2 weeks. Billing Type: Third-Party Bill Information: Selecting Yes will display possible errors in your note based on the variables you have selected. This validation is only offered as a suggestion for you. PLEASE NOTE THAT THE VALIDATION TEXT WILL BE REMOVED WHEN YOU FINALIZE YOUR NOTE. IF YOU WANT TO FAX A PRELIMINARY NOTE YOU WILL NEED TO TOGGLE THIS TO 'NO' IF YOU DO NOT WANT IT IN YOUR FAXED NOTE.

## 2022-11-01 NOTE — PATIENT PROFILE ADULT - OVER THE PAST TWO WEEKS, HAVE YOU FELT LITTLE INTEREST OR PLEASURE IN DOING THINGS?
PCP: Rianna Vila MD      REASON FOR CONSULTATION:  Uncontrolled Type 2 diabetes mellitus    HISTORY OF PRESENT ILLNESS:  The patient is a pleasant 56 year old who comes for follow up for uncontrolled T2DM. She was diagnosed with diabetes at the age of 45yrs .     Currently she is on a regimen of Toujeo 140 unit daily , Humalog 10-20 units( adjust based on carb/sugar) , Ozempic 1mg weekly ,pioglitazone 15mg daily ( off metformin because of the diarrhea; has used Jardiance in the past, but states she \"did not respond well to it). Her  A1c done was 6.9 .Has freestyle cheri-     4 weeks data reviewed  Percentage times CGM active 86%   Average blood sugar 156  Glucose variability 30.5%  GMI 7.0%  In target range () 71%  High sugar between 181-250  25%  Very high above 250  4%  Low blood sugar between 54-69 0%  Very low blood sugar below 54 0%      Has had some intermittent spike post meal     She has non proliferative diabetic retinopathy and follows with ophthalmology every year, last dilated eye exam adina June 2021.  Denies any blurriness of vision.  She has no neuropathy complaint : no sore or ulceration.  Her urine shows no proteinuria.     With regards to HTN, she is on losartan, propranolol, and maxide daily. Denies any dizziness.    With regard to dyslipidemia,She is taking Crestor and fenofibrate. Last ldl 66 and     She also has MNG with a dominant one 2.1 on the left side and a 1.5cm larger nodule on the R side.She has undergone fnac of both these nodules(left twice in 2013 in 2015 and right 1 in 2013) with cytology consistent with colloid nodule. Her follow-up ultrasound done today showed  increase in the size of the isthmus nodule/pseudonodule and the left side spongiform nodule. Denies any difficulty swallowing or any change in voice.      TSH (mcUnits/mL)   Date Value   02/16/2021 1.322       Current Outpatient Medications   Medication Sig Dispense Refill   • Insulin Lispro, 1 Unit Dial,  (HumaLOG KwikPen) 100 UNIT/ML pen-injector Inject 10-20 units before meals three times a day per sliding scale. MDD 60units. Prime 2 units before each dose. 60 mL 1   • blood glucose (FreeStyle Precision Teo Test) test strip Test blood sugar 3 times daily. Diagnosis: DM2, Ell.9. Meter: 100 strip 3   • Insulin Glargine, 2 Unit Dial, (Toujeo Max SoloStar) 300 UNIT/ML pen-injector Inject 130 Units into the skin daily. 42 mL 3   • fluoxetine (PROzac) 40 MG capsule Take 40 mg by mouth daily.     • Continuous Blood Gluc Sensor (FreeStyle Diana 2 Sensor) Misc 1 each every 14 days. Use to check blood sugar 4 times daily. Diagnosis: E11.65, Meter: Freestyle Diana 2 2 each 0   • Insulin Pen Needle (B-D U/F PEN NEEDLE) 31G X 5 MM Misc USE TO INJECT INSULIN FIVE TIMES DAILY 500 each 3   • pioglitazone (ACTOS) 15 MG tablet TAKE 1 TABLET BY MOUTH DAILY 90 tablet 3   • Semaglutide, 1 MG/DOSE, (Ozempic, 1 MG/DOSE,) 4 MG/3ML Solution Pen-injector Inject 1 mg into the skin every 7 days. 9 mL 1   • Continuous Blood Gluc  (FreeStyle Diana 2 [place]) Device 1 each daily. Diagnosis: E11.65, Meter: Freestyle Diana 2 1 each 0   • Accu-Chek FastClix Lancets Misc USE TO TEST BLOOD SUGAR 4  TIMES DAILY. DIAGNOSIS: DM 2; E11.65. 400 each 1   • fenofibrate 160 MG tablet Take 160 mg by mouth daily.     • rosuvastatin (CRESTOR) 10 MG tablet Take 10 mg by mouth daily.     • Lancets Misc. (ACCU-CHEK FASTCLIX LANCET) Kit Use to check blood sugar 4 times daily as directed. Diagnosis: E11.65 1 kit 0   • busPIRone (BUSPAR) 15 MG tablet Take 15 mg by mouth 2 times daily. Dose = 15 mg 2 times daily may take additional 15 mg tab if needed for anxiety     • ondansetron (ZOFRAN) 4 MG tablet Take 1 tablet by mouth every 8 hours as needed for Nausea. 5 tablet 0   • losartan (COZAAR) 50 MG tablet Take 50 mg by mouth nightly.      • sharps container To dispose of sharps. DM2 E11.65 1 each 12   • omeprazole (PRILOSEC) 40 MG capsule Take 40 mg by mouth  daily.     • ALPRAZolam (XANAX) 1 MG tablet Take 1 mg by mouth 4 times daily as needed for Anxiety.      • triamterene-hydrochlorothiazide (MAXZIDE-25) 37.5-25 MG per tablet Take 1 tablet by mouth daily.         No current facility-administered medications for this visit.     ALLERGIES:   Allergen Reactions   • Codeine HIVES   • Hydrocodone HIVES   • Percocet [Apap-Fd&C Red #40 [place]-Oxycodone] HIVES   • Tramadol CARDIAC DISTURBANCES   • Bandage [Adhesive   (Environmental)]      redness     Past Medical History:   Diagnosis Date   • Anxiety Disorder    • Depression    • Difficult intubation 2018   • DM2 (diabetes mellitus, type 2) (CMS/[facility])    • Essential (primary) hypertension    • GERD    • HLD (hyperlipidemia)    • Hyperlipidemia    • Nontoxic multinodular goiter    • PTSD (post-traumatic stress disorder)      Past Surgical History:   Procedure Laterality Date   • Back surgery  11    right L4-S1 lami and discectomy   • Breast surgery  breast augmentation   •  delivery+postpartum care         • Cholecystectomy  04/15/2018    Laparoscopic   • Hysterectomy     • Us guided thyroid biopsy           REVIEW OF SYSTEMS    I conducted a 6 point review of systems with the patient that included General, Neurological, Cardiovascular, Eyes,Pulmonary, GI . Pertinent positives discussed in the HPI.      Physical exam  Visit Vitals  /63   Pulse 75   Ht 5' 5\" (1.651 m)   Wt 103.8 kg (228 lb 12.8 oz)   LMP 2014 (LMP Unknown)   BMI 38.07 kg/m²       HEENT:  Normocephalic, atraumatic.  Extraocular movement intact. Conjunctivae clear.  Sclerae anicteric.  No lid lag, no proptosis.  NECK:  Supple.  CHEST:  Breathing is unlabored.  NEUROLOGIC:  Alert, awake, oriented x3, no obvious focal deficit.  SKIN:  Warm and dry.  No rash              LABORATORY DATA:   DIABETIC HEALTH MAINTENANCE LABREVIEW    Hemoglobin A1C (%)   Date Value   2021 8.8 (H)     LDL (mg/dL)   Date Value   2021  66       HDL (mg/dL)   Date Value   02/16/2021 49 (L)      Triglycerides (mg/dL)   Date Value   02/16/2021 185 (H)       Cholesterol (mg/dL)   Date Value   02/16/2021 152     HCT (%)   Date Value   06/10/2020 39.5       GPT/ALT (Units/L)   Date Value   06/10/2020 22       GOT/AST (Units/L)   Date Value   06/10/2020 11     Microalbumin/ Creatinine Ratio (mg/g)   Date Value   02/16/2021 7.6       TSH (mcUnits/mL)   Date Value   02/16/2021 1.322       Vitamin D, 25-Hydroxy (ng/mL)   Date Value   02/16/2021 26.8 (L)            ASSESSMENT AND RECOMMENDATION:    1.  Type 2 diabetes mellitus with hyperglycemia: Counseled regarding importance of dietary compliance, activity, glycemic goals, A1c goal, complications associated with uncontrolled glycemia, appropriate medications intake and compliance.  Continue with current regimen regimen .    2.  Dyslipidemia.  Continue statin and fenofibrate.     3. HTN.  Continue same regimen    4. Thyroid.  Reviewed the ultrasound results-the left inferior nodule has been biopsied twice with benign cytology; the isthmus nodule or pseudo nodule has not been biopsied previously; left dominant nodule is a spongiform nodule. She is asymptomatic- can continue with conservative management for now    Thank you for allowing me to participate in the care of this patient. Please let me know if you have any questions or concerns.       no

## 2022-11-15 ENCOUNTER — APPOINTMENT (OUTPATIENT)
Dept: GASTROENTEROLOGY | Facility: CLINIC | Age: 62
End: 2022-11-15

## 2022-11-17 NOTE — PHYSICAL THERAPY INITIAL EVALUATION ADULT - ADDITIONAL COMMENTS
Pt's history  unclear. Pt A&O x 3 but forgetful. First she states she lives with her son and he works and then states she lives with daughter who is home and helps her. Pt then reported she lives in a three story house but her bedroom is on the first floor. Reported that there were 10 ALE the house.  to follow up regarding accurate history. 3 = A little assistance

## 2022-12-02 NOTE — ED ADULT NURSE NOTE - NS ED NURSE DISCH DISPOSITION
What Type Of Note Output Would You Prefer (Optional)?: Standard Output Hpi Title: Evaluation of Skin Lesions How Severe Are Your Spot(S)?: mild Have Your Spot(S) Been Treated In The Past?: has not been treated Discharged

## 2022-12-12 NOTE — PHYSICAL THERAPY INITIAL EVALUATION ADULT - SITTING BALANCE: STATIC
Have Your Skin Lesions Been Treated?: not been treated Is This A New Presentation, Or A Follow-Up?: Skin Lesions How Severe Is Your Skin Lesion?: moderate good balance

## 2023-01-07 NOTE — PATIENT PROFILE ADULT - NSPROPTRIGHTSUPPORTPERSON_GEN_A_NUR
28yo female with PMH of DM type 2, diabetic gastroparesis, aortic thrombus s/p bilateral BKA, admitted with DKA, which is now resolved. Patient continues to have abdominal pain and gastroparesis. She is refusing PO intake, inculding medications. She does not want to take her eliquis, so lovenox thereapeutic dose was started. Patient is very cachectic. Nutrition consulted. She was found to have UTI on admission and antibitoics started, but she is refusing antibiotics today. No bleeding. Patient continues to report pain and requesting narcotic analgesia, despite multiple prior conversations on avoiding narcotics due to gastroparesis. I again discussed with her that narcotics would be counterproductive in her case. She is requesting IV benadryl for itching and sleep. COVID negative.   1/8: patient sleeping today, no distress. Ativan started for gastroparesis, will monitor response. Continuing to encourage PO diet. HR and BP elevated last night, patient transferred to telemetry as labetalol was started.1  1/9: patient feeling significantly improved, tolerating diet. She is eager to go home. Will follow up with endocrinology on outpatient diabetes regimen. Encouraged patient to take all  her medications as directed. She verbalized understanding. Patient is medically stable and agrees to the discharge plan today.    same name as above

## 2023-04-05 NOTE — DISCHARGE NOTE ADULT - NS MD DC FALL RISK RISK
For information on Fall & Injury Prevention, visit [email or web address] Advancement Flap (Double) Text: The defect edges were debeveled with a #15 scalpel blade.  Given the location of the defect and the proximity to free margins a double advancement flap was deemed most appropriate.  Using a sterile surgical marker, the appropriate advancement flaps were drawn incorporating the defect and placing the expected incisions within the relaxed skin tension lines where possible.    The area thus outlined was incised deep to adipose tissue with a #15 scalpel blade.  The skin margins were undermined to an appropriate distance in all directions utilizing iris scissors.

## 2023-06-09 NOTE — ED ADULT NURSE NOTE - IN THE PAST 12 MONTHS HAVE YOU USED DRUGS OTHER THAN THOSE REQUIRED FOR MEDICAL REASON?
Patient is alert and oriented to person place and time.   Appearance: Dressed appropriately, well groomed, good hygiene, no gross abnormalities   Behavior/Attitude: Good rapport with examiner, no psychomotor agitation, no psychomotor slowing   Speech: Talkative, normal volume, increased rate, normal tone   Mood: "I feel better"   Affect: congruent to mood, stable, increased expressivity   Thought process: circumferential at times   Thought content: denies visual, tactile, and auditory hallucinations,  no suicidal or homicidal ideation  cognition: intact  memory: Intact  insight: fair    Judgement: fair    No

## 2023-06-12 NOTE — ED ADULT TRIAGE NOTE - BSA (M2)
1.75 Detail Level: Simple Render In Strict Bullet Format?: No Plan: Recommend use of Skin Medic[employer] compound chemotherapy cream on hands bid x 5 days. Initiate Treatment: Skin Medicinals compound chemotherapy cream on hands bid x 5 days.

## 2023-06-24 NOTE — PHYSICAL THERAPY INITIAL EVALUATION ADULT - PHYSICAL ASSIST/NONPHYSICAL ASSIST: SIT/SUPINE, REHAB EVAL
Regular rate and rhythm, Heart sounds S1 S2 present, no murmurs, rubs or gallops
1 person assist/for LEs

## 2023-07-12 NOTE — H&P ADULT - NSICDXFAMILYHX_GEN_ALL_CORE_FT
FAMILY HISTORY:  Sibling  Still living? Unknown  Family history of kidney disease in brother, Age at diagnosis: Age Unknown     Patient in office because she is saying that [facility] is not able to fill the medication request for the Quinapril 20mg tablet as there is a shortage on it. Please advise what other medication she can take in place of it and send a new RX to [facility]. If needed, call patient at [phone].

## 2023-08-02 NOTE — DISCHARGE NOTE PROVIDER - NSDCCPCAREPLAN_GEN_ALL_CORE_FT
Received request via: Pharmacy    Was the patient seen in the last year in this department? Yes    Does the patient have an active prescription (recently filled or refills available) for medication(s) requested? No    Does the patient have custodial Plus and need 100 day supply (blood pressure, diabetes and cholesterol meds only)? Yes, quantity updated to 100 days   PRINCIPAL DISCHARGE DIAGNOSIS  Diagnosis: Abdominal pain  Assessment and Plan of Treatment:       SECONDARY DISCHARGE DIAGNOSES  Diagnosis: Cirrhosis of liver with ascites  Assessment and Plan of Treatment:     Diagnosis: End stage renal disease on dialysis  Assessment and Plan of Treatment:      PRINCIPAL DISCHARGE DIAGNOSIS  Diagnosis: Abdominal pain  Assessment and Plan of Treatment: - Follow up with Gastroenerology      SECONDARY DISCHARGE DIAGNOSES  Diagnosis: Cirrhosis of liver with ascites  Assessment and Plan of Treatment: - Continue taking Pantoprazole   - Follow up with Gastroenterology    Diagnosis: End stage renal disease on dialysis  Assessment and Plan of Treatment: - Continue with hemodialysis schedule M/W/F  - Follow up with Nephrology    Diagnosis: Hypertension  Assessment and Plan of Treatment: - Continue with current medications  - Follow up with your PMD    Diagnosis: Hypothyroidism  Assessment and Plan of Treatment: - Continue with current medications  - Follow up with your PMD    Diagnosis: Atrial fibrillation  Assessment and Plan of Treatment: - Follow up with your PMD     PRINCIPAL DISCHARGE DIAGNOSIS  Diagnosis: Abdominal pain  Assessment and Plan of Treatment: - In the setting of ascites/liver cirrhosis  - Continue Pantoprazole as prescribed  - Follow up with Gastroenterology      SECONDARY DISCHARGE DIAGNOSES  Diagnosis: Cirrhosis of liver with ascites  Assessment and Plan of Treatment: - As above  - Follow up with GI    Diagnosis: End stage renal disease on dialysis  Assessment and Plan of Treatment: - Continue with hemodialysis schedule M/W/F  - Follow up with Nephrology    Diagnosis: Hypertension  Assessment and Plan of Treatment: - Continue with Metoprolol, Norvasc, and Hydralazine as prescribed  - Follow up with your PMD  - Monitor BP     PRINCIPAL DISCHARGE DIAGNOSIS  Diagnosis: Abdominal pain  Assessment and Plan of Treatment: - In the setting of ascites/liver cirrhosis  - Continue Pantoprazole as prescribed  - Follow up with Gastroenterology      SECONDARY DISCHARGE DIAGNOSES  Diagnosis: Erosive esophagitis  Assessment and Plan of Treatment: continue protonix   follow up with gastroenterologist for stomach biospy result    Diagnosis: Cirrhosis of liver with ascites  Assessment and Plan of Treatment: - As above  - Follow up with GI    Diagnosis: End stage renal disease on dialysis  Assessment and Plan of Treatment: - Continue with hemodialysis schedule M/W/F  - Follow up with Nephrology    Diagnosis: Hypertension  Assessment and Plan of Treatment: - Continue with Metoprolol, Norvasc, and Hydralazine as prescribed  - Follow up with your PMD  - Monitor BP

## 2023-08-15 NOTE — ED PROVIDER NOTE - CROS ED EYES ALL NEG
Pt AAOx4. GCS 15. VSS. Afebrile. NSR on monitor. 2LNC while awake, BiPAP while asleep. Suprapubic catheter in place; 650mL UOP this shift. BM x1 this shift. No accuchecks. Family updated at the bedside during visiting hours..    Pt resting comfortably in bed with side rails up x3; locked and lowered with alarm set. Call light in place. Advised pt to call out if anything is needed. Pt verbalized understanding.   negative...

## 2023-08-18 NOTE — BRIEF OPERATIVE NOTE - POST-OP DX
Other ascites  02/15/2019    Active  Yury Magdaleno Pt noted with 2+ right hand edema which can contributes to weight gain./fluid retention Pt noted with 2+ right hand edema which can contributes to weight fluctuations./fluid retention

## 2023-09-25 NOTE — ED PROVIDER NOTE - LOCATION
abdomen Xolair Counseling:  Patient informed of potential adverse effects including but not limited to fever, muscle aches, rash and allergic reactions.  The patient verbalized understanding of the proper use and possible adverse effects of Xolair.  All of the patient's questions and concerns were addressed.

## 2023-11-14 NOTE — PATIENT PROFILE ADULT. - FUNCTIONAL LEVEL PRIOR: EATING
Problem: Angina/Chest Pain  Goal: # Achieves Chest Pain Control (Pain Score = 0-1, no episodes)  Description: Chest pain control = Pain Score = 0-1, no episodes of pain  Outcome: Outcome Met, Continue evaluating goal progress toward completion  Goal: Anxiety is controlled  Outcome: Outcome Met, Continue evaluating goal progress toward completion  Goal: # Verbalizes understanding of symptoms, diagnosis, and treatment  Description: Document on Patient Education Activity  Outcome: Outcome Met, Continue evaluating goal progress toward completion     Problem: VTE, Risk for  Goal: # No s/s of VTE  Outcome: Outcome Met, Continue evaluating goal progress toward completion  Goal: # Verbalizes understanding of VTE risk factors and prevention  Description: Document education using the patient education activity.   Outcome: Outcome Met, Continue evaluating goal progress toward completion  Goal: Demonstrates ability to administer injectable anticoagulants if ordered for d/c  Description: Document education using the patient education activity.  Outcome: Outcome Met, Continue evaluating goal progress toward completion     Problem: VTE (Actual)  Goal: # Verbalizes understanding of VTE and treatment plan  Description: Document education using the patient education activity.   Outcome: Outcome Met, Continue evaluating goal progress toward completion      (0) independent

## 2023-11-27 NOTE — ED PROVIDER NOTE - CHPI ED SYMPTOMS POS
Gene Machado (:  1964) is a 63 y.o. female,Established patient, here for evaluation of the following chief complaint(s):  Diabetes (Per patient no issues or concerns)      ASSESSMENT/PLAN:    ICD-10-CM    1. Type 2 diabetes mellitus with diabetic polyneuropathy, with long-term current use of insulin (HCC)  E11.42 CBC with Auto Differential    Z79.4 Comprehensive Metabolic Panel     Hemoglobin A1C     Lipid Panel     Microalbumin / Creatinine Urine Ratio     T4, Free     TSH     HM DIABETES FOOT EXAM      2. Type 2 diabetes mellitus with hyperglycemia, with long-term current use of insulin (HCC)  E11.65     Z79.4       3. Primary insomnia  F51.01 zolpidem (AMBIEN) 10 MG tablet     hydrOXYzine HCl (ATARAX) 50 MG tablet      4. Mixed hyperlipidemia  E78.2       5. Primary hypertension  I10           Return in about 6 months (around 2024) for physcial with pap. SUBJECTIVE/OBJECTIVE:  HPI  Diabetes Mellitus Type 2:   Medications:   Metformin 500 mg  Bid  Lantus 30 u    Ozempic 2 mg   Home blood sugar records: trend: stable  Any episodes of hypoglycemia? no  Eye exam current (within one year): yes  Tobacco history: She  reports that she has never smoked. She has never used smokeless tobacco.   Daily Aspirin? Yes  Weight trend: stable  Current symptoms/problems include neuropathy. Hypertension:   Medications:   Losartan hctz 100-25   Home blood pressure monitoring: No.  She is adherent to a low sodium diet. Patient denies chest pain. Antihypertensive medication side effects: no medication side effects noted. Use of agents associated with hypertension: none. Hyperlipidemia:    Medications:   atorvastatin (Lipitor)   No new myalgias or GI upset on atorvastatin (Lipitor). Insomnia  Take ambien 1-2 nights per week to catch up on sleep. Not sleeping well otherwise. He  is in late stages of cancer.      Lab Results   Component Value Date    LABA1C 6.8 (H) 2022    LABA1C 6.8 (H)
NAUSEA/PAIN

## 2024-01-27 NOTE — PROGRESS NOTE ADULT - SUBJECTIVE AND OBJECTIVE BOX
MARIA D  called w/ an update.  [facility] is unable to take Mason due to acuity.  Discussed is MARIA D had called APS on this ED encounter.   [facility] Division of Hospital Medicine    Chief Complaint:  SVT    SUBJECTIVE / OVERNIGHT EVENTS: Patient seen and examined. Reports minimal pain.     Patient denies chest pain, SOB, abd pain, N/V, fever, chills, dysuria or any other complaints. All remainder ROS negative.     MEDICATIONS  (STANDING):  amLODIPine   Tablet 10 milliGRAM(s) Oral daily  aspirin enteric coated 81 milliGRAM(s) Oral daily  atorvastatin 40 milliGRAM(s) Oral at bedtime  dextrose 40% Gel 15 Gram(s) Oral once  dextrose 5%. 1000 milliLiter(s) (50 mL/Hr) IV Continuous <Continuous>  dextrose 5%. 1000 milliLiter(s) (100 mL/Hr) IV Continuous <Continuous>  dextrose 50% Injectable 25 Gram(s) IV Push once  dextrose 50% Injectable 12.5 Gram(s) IV Push once  dextrose 50% Injectable 25 Gram(s) IV Push once  epoetin gian-epbx (RETACRIT) Injectable 19056 Unit(s) IV Push <User Schedule>  glucagon  Injectable 1 milliGRAM(s) IntraMuscular once  heparin   Injectable 5000 Unit(s) SubCutaneous every 8 hours  hydrALAZINE 25 milliGRAM(s) Oral daily  insulin lispro (ADMELOG) corrective regimen sliding scale   SubCutaneous three times a day before meals  insulin lispro (ADMELOG) corrective regimen sliding scale   SubCutaneous at bedtime  levothyroxine 50 MICROGram(s) Oral daily  lisinopril 40 milliGRAM(s) Oral daily  metoprolol succinate  milliGRAM(s) Oral daily    MEDICATIONS  (PRN):        I&O's Summary    09 Apr 2021 07:01  -  09 Apr 2021 14:52  --------------------------------------------------------  IN: 0 mL / OUT: 2000 mL / NET: -2000 mL        PHYSICAL EXAM:  Vital Signs Last 24 Hrs  T(C): 37.1 (09 Apr 2021 11:49), Max: 37.1 (09 Apr 2021 11:49)  T(F): 98.7 (09 Apr 2021 11:49), Max: 98.7 (09 Apr 2021 11:49)  HR: 81 (09 Apr 2021 07:41) (75 - 83)  BP: 143/83 (09 Apr 2021 11:49) (143/83 - 180/82)  BP(mean): --  RR: 18 (09 Apr 2021 11:49) (18 - 19)  SpO2: 100% (09 Apr 2021 11:49) (91% - 100%)        CONSTITUTIONAL: NAD, well-developed, well-groomed  ENMT: Moist oral mucosa, no pharyngeal injection or exudates; normal dentition  RESPIRATORY: Normal respiratory effort; lungs are clear to auscultation bilaterally  CARDIOVASCULAR: Regular rate and rhythm, normal S1 and S2, no murmur/rub/gallop; No lower extremity edema; Peripheral pulses are 2+ bilaterally  ABDOMEN: Nontender to palpation, normoactive bowel sounds, no rebound/guarding; No hepatosplenomegaly  MUSCLOSKELETAL:  Normal gait; no clubbing or cyanosis of digits; no joint swelling or tenderness to palpation  PSYCH: A+O to person, place, and time; affect appropriate  NEUROLOGY: CN 2-12 are intact and symmetric; no gross sensory deficits;   SKIN: No rashes; no palpable lesions    LABS:                        8.9    5.22  )-----------( 228      ( 09 Apr 2021 03:11 )             29.4     04-09    132<L>  |  87<L>  |  36.0<H>  ----------------------------<  229<H>  5.1   |  28.0  |  5.08<H>    Ca    8.2<L>      09 Apr 2021 03:11  Phos  3.6     04-08  Mg     2.2     04-08    TPro  8.0  /  Alb  4.0  /  TBili  0.7  /  DBili  x   /  AST  30  /  ALT  17  /  AlkPhos  303<H>  04-07    PT/INR - ( 07 Apr 2021 18:48 )   PT: 13.0 sec;   INR: 1.13 ratio         PTT - ( 07 Apr 2021 18:48 )  PTT:34.0 sec  CARDIAC MARKERS ( 08 Apr 2021 03:15 )  x     / 0.54 ng/mL / x     / x     / x      CARDIAC MARKERS ( 07 Apr 2021 18:48 )  x     / 0.55 ng/mL / x     / x     / x              CAPILLARY BLOOD GLUCOSE      POCT Blood Glucose.: 95 mg/dL (09 Apr 2021 12:24)  POCT Blood Glucose.: 170 mg/dL (09 Apr 2021 09:05)  POCT Blood Glucose.: 226 mg/dL (08 Apr 2021 21:20)  POCT Blood Glucose.: 239 mg/dL (08 Apr 2021 16:07)        RADIOLOGY & ADDITIONAL TESTS:  Results Reviewed:   Imaging Personally Reviewed:  Electrocardiogram Personally Reviewed:                                           [facility] Division of Hospital Medicine    Chief Complaint:  SVT    SUBJECTIVE / OVERNIGHT EVENTS: Patient seen and examined. Reports minimal pain. She wants to go home. She tells me she has fallen a few times a home, last one was 4 weeks ago. I spoke to Daughter in law Bhakti who tells me she fell from the snd step a few days ago. I notified her of the fall patient had yesterday and the fractures in the back and wrist. PT to eval patient today now that she cannot weight bear on the left hand.      Patient denies chest pain, SOB, abd pain, N/V, fever, chills, dysuria or any other complaints. All remainder ROS negative.     MEDICATIONS  (STANDING):  amLODIPine   Tablet 10 milliGRAM(s) Oral daily  aspirin enteric coated 81 milliGRAM(s) Oral daily  atorvastatin 40 milliGRAM(s) Oral at bedtime  dextrose 40% Gel 15 Gram(s) Oral once  dextrose 5%. 1000 milliLiter(s) (50 mL/Hr) IV Continuous <Continuous>  dextrose 5%. 1000 milliLiter(s) (100 mL/Hr) IV Continuous <Continuous>  dextrose 50% Injectable 25 Gram(s) IV Push once  dextrose 50% Injectable 12.5 Gram(s) IV Push once  dextrose 50% Injectable 25 Gram(s) IV Push once  epoetin gian-epbx (RETACRIT) Injectable 43713 Unit(s) IV Push <User Schedule>  glucagon  Injectable 1 milliGRAM(s) IntraMuscular once  heparin   Injectable 5000 Unit(s) SubCutaneous every 8 hours  hydrALAZINE 25 milliGRAM(s) Oral daily  insulin lispro (ADMELOG) corrective regimen sliding scale   SubCutaneous three times a day before meals  insulin lispro (ADMELOG) corrective regimen sliding scale   SubCutaneous at bedtime  levothyroxine 50 MICROGram(s) Oral daily  lisinopril 40 milliGRAM(s) Oral daily  metoprolol succinate  milliGRAM(s) Oral daily    MEDICATIONS  (PRN):        I&O's Summary    09 Apr 2021 07:01  -  09 Apr 2021 14:52  --------------------------------------------------------  IN: 0 mL / OUT: 2000 mL / NET: -2000 mL        PHYSICAL EXAM:  Vital Signs Last 24 Hrs  T(C): 37.1 (09 Apr 2021 11:49), Max: 37.1 (09 Apr 2021 11:49)  T(F): 98.7 (09 Apr 2021 11:49), Max: 98.7 (09 Apr 2021 11:49)  HR: 81 (09 Apr 2021 07:41) (75 - 83)  BP: 143/83 (09 Apr 2021 11:49) (143/83 - 180/82)  BP(mean): --  RR: 18 (09 Apr 2021 11:49) (18 - 19)  SpO2: 100% (09 Apr 2021 11:49) (91% - 100%)        CONSTITUTIONAL: NAD, well-developed, well-groomed  ENMT: Moist oral mucosa, no pharyngeal injection or exudates; normal dentition  RESPIRATORY: Normal respiratory effort; lungs are clear to auscultation bilaterally  CARDIOVASCULAR: Regular rate and rhythm, normal S1 and S2,  No lower extremity edema  ABDOMEN: Nontender to palpation, normoactive bowel sounds, no rebound/guarding  MUSCLOSKELETAL:  left wrist splint  PSYCH: A+O to person, place, and time; affect appropriate  NEUROLOGY: CN 2-12 are intact and symmetric; no gross sensory deficits;   SKIN: No rashes; no palpable lesions    LABS:                        8.9    5.22  )-----------( 228      ( 09 Apr 2021 03:11 )             29.4     04-09    132<L>  |  87<L>  |  36.0<H>  ----------------------------<  229<H>  5.1   |  28.0  |  5.08<H>    Ca    8.2<L>      09 Apr 2021 03:11  Phos  3.6     04-08  Mg     2.2     04-08    TPro  8.0  /  Alb  4.0  /  TBili  0.7  /  DBili  x   /  AST  30  /  ALT  17  /  AlkPhos  303<H>  04-07    PT/INR - ( 07 Apr 2021 18:48 )   PT: 13.0 sec;   INR: 1.13 ratio         PTT - ( 07 Apr 2021 18:48 )  PTT:34.0 sec  CARDIAC MARKERS ( 08 Apr 2021 03:15 )  x     / 0.54 ng/mL / x     / x     / x      CARDIAC MARKERS ( 07 Apr 2021 18:48 )  x     / 0.55 ng/mL / x     / x     / x              CAPILLARY BLOOD GLUCOSE      POCT Blood Glucose.: 95 mg/dL (09 Apr 2021 12:24)  POCT Blood Glucose.: 170 mg/dL (09 Apr 2021 09:05)  POCT Blood Glucose.: 226 mg/dL (08 Apr 2021 21:20)  POCT Blood Glucose.: 239 mg/dL (08 Apr 2021 16:07)        RADIOLOGY & ADDITIONAL TESTS:  Results Reviewed:   Imaging Personally Reviewed:  Electrocardiogram Personally Reviewed:

## 2024-02-05 NOTE — DIETITIAN INITIAL EVALUATION ADULT. - PROBLEM SELECTOR PROBLEM 8
-- DO NOT REPLY / DO NOT REPLY ALL --  -- Message is from Engagement Center Operations ([facility]) --    General Patient Message: mentioned pink eye is going around at  looking to see if doctor can provide script please assist.      Caller Information         Type Contact Phone/Fax    02/05/2024 08:48 AM CST Phone (Incoming) JeanneDina fontenot (Mother) [phone] (M)          Alternative phone number: n/a    Can a detailed message be left? Yes    Message Turnaround:                R/O Anemia, unspecified type

## 2024-05-14 NOTE — PATIENT PROFILE ADULT. - AS SC BRADEN ACTIVITY
Tomás Johnson is a 19 year old female presenting to the walk-in clinic today for possible UTI   Frequency of urine, blood in urine since Sunday     Pt reports she takes antibiotics that she had at home but does not know the name of it.            Swabs/Specimens collected during rooming process:    Urine        Patient would like communication of their results via:       Cell Phone:   Telephone Information:   Mobile [phone]     Okay to leave a message containing results? Yes        (3) walks occasionally

## 2024-05-23 NOTE — ED ADULT NURSE NOTE - THOUGHTS OF SUICIDE/SELF-HARM YN, MLM
This nurse went to check on the pt because quite frankly he was being too quiet and it was very out of character for him. Found pt's bag of belongings beside the bed with his sealed bag of prescriptions to take home opened. This nurse confiscated the bag and found 13 of his 5 mg oxycodone missing. When confronted, pt said \"he took two doses of his medication\". Pt usually takes 15 mg of oxycodone every 4 hours at home. Luna Walsh NP notified of situation. She said to discontinue the pain meds we are currently giving him and to continue to monitor him. Pt stable at this time    Electronically signed by Kennedi Minor RN on 5/23/2024 at 3:35 AM    No

## 2024-05-27 NOTE — DISCHARGE NOTE PROVIDER - NSDCHHHOMEBOUND_GEN_ALL_CORE
Monitor bmi   Guidelines-log into [employer] aysha to monitor grams of protein and carbohydrates.  Protein  g/day  better weight loss with 100 g or more. Exceeding goal  Net Carbohydrates below 100 g/day-exceeding goal.  Can go over with fruit intake.  3-5 servings fruits and vegetables a day. She will add a fruit  Minimum 64 ounces of water a day-meets goal  Exercise 150 minutes of cardio/week-goes to the gym and alternates cardio with strength training.  2 times a week strength training 15 minutes. -see above  Sleep is good  Stress is higher. She does sit outside, walk her dog, go to the gym or sees her therapist to reduce stress   We will refill her  contrave  Follow up in 3 months. Office # is [phone].   Ataxic gait

## 2024-08-14 NOTE — ED ADULT NURSE NOTE - NURSING GU BLADDER
H&P reviewed and no changes identified.  Reviewed risks of procedure in detail. Will proceed.  Marissa Merlos MD     non-distended/non-tender

## 2024-09-30 NOTE — PHYSICAL THERAPY INITIAL EVALUATION ADULT - ADDITIONAL COMMENTS
[de-identified] : Patient allowed to gently start resuming activities. Discussed change to medication prescription and usage. Offered cortisone steroid injection.for pain control Bracing options discussed with patient. Hyaluronic Acid inj pamphlet given to pt. try topical lidocaine for pain reviewed current medications being used by this patient Home exercise for functional improvement  pt reports she lives in her daughter's house with 8 steps to enter with one handrail + 10 steps to second floor with one handrail. pt reports she is able to ambulate independently with RW, required assistance with all ADLs. pt reports a HHA assists her 7 days a week from 8am to about 2pm, until her daughter comes home from work. pt reports she uses O2 at home for about an hour in the morning or at night, but unable to state L/min used. pt owns a RW and shower chair.

## 2024-10-03 NOTE — ED ADULT TRIAGE NOTE - NSWEIGHTCALCTOOLDRUG_GEN_A_CORE
Please notify the patient that their pet scan shows an abnormal area in the left inguinal area. Recommendations for biopsy from radiology. Orders have been placed.  used

## 2024-12-04 NOTE — ED ADULT TRIAGE NOTE - NS ED NURSE BANDS TYPE
Left voicemail regarding scheduling surgery with Dr. Guallpa. Provided direct line for patient to call.    P: [phone]      Latonia Deal on 12/4/2024 at 1:50 PM     Name band;

## 2025-01-20 NOTE — ED PROVIDER NOTE - CPE EDP EYES NORM
Detail Level: Zone Render In Strict Bullet Format?: No Continue Regimen: Botox injections every 3 months Initiate Treatment: Hydrocortisone bid prn flares normal...

## 2025-04-10 NOTE — ED PROVIDER NOTE - IV ALTEPLASE DOOR HIDDEN
April 16, 2025      Oscar Mojica  [address]  [place] [address]        Dear ,    We are writing to inform you of your test results.    Elevated blood glucose in the context of diabetes and satisfactory A1c   Satisfactory lipids   Normal level vitamin D  Recommend daily supplementation (sent to preferred pharmacy)      Resulted Orders   Hemoglobin A1c   Result Value Ref Range    Estimated Average Glucose 126 (H) <117 mg/dL    Hemoglobin A1C 6.0 (H) 0.0 - 5.6 %      Comment:      Normal <5.7%   Prediabetes 5.7-6.4%    Diabetes 6.5% or higher     Note: Adopted from ADA consensus guidelines.   Comprehensive metabolic panel   Result Value Ref Range    Sodium 141 135 - 145 mmol/L    Potassium 4.2 3.4 - 5.3 mmol/L    Carbon Dioxide (CO2) 28 22 - 29 mmol/L    Anion Gap 9 7 - 15 mmol/L    Urea Nitrogen 17.3 8.0 - 23.0 mg/dL    Creatinine 0.84 0.67 - 1.17 mg/dL    GFR Estimate >90 >60 mL/min/1.73m2      Comment:      eGFR calculated using 2021 CKD-EPI equation.    Calcium 9.4 8.8 - 10.4 mg/dL    Chloride 104 98 - 107 mmol/L    Glucose 127 (H) 70 - 99 mg/dL    Alkaline Phosphatase 67 40 - 150 U/L    AST 24 0 - 45 U/L    ALT 15 0 - 70 U/L    Protein Total 7.0 6.4 - 8.3 g/dL    Albumin 4.2 3.5 - 5.2 g/dL    Bilirubin Total 0.2 <=1.2 mg/dL    Patient Fasting > 8hrs? Unknown    Lipid panel reflex to direct LDL Fasting   Result Value Ref Range    Cholesterol 159 <200 mg/dL    Triglycerides 117 <150 mg/dL    Direct Measure HDL 52 >=40 mg/dL    LDL Cholesterol Calculated 84 <100 mg/dL    Non HDL Cholesterol 107 <130 mg/dL    Patient Fasting > 8hrs? Unknown     Narrative    Cholesterol  Desirable: < 200 mg/dL  Borderline High: 200 - 239 mg/dL  High: >= 240 mg/dL    Triglycerides  Normal: < 150 mg/dL  Borderline High: 150 - 199 mg/dL  High: 200-499 mg/dL  Very High: >= 500 mg/dL    Direct Measure HDL  Female: >= 50 mg/dL   Male: >= 40 mg/dL    LDL Cholesterol  Desirable: < 100 mg/dL  Above Desirable: 100 - 129  mg/dL   Borderline High: 130 - 159 mg/dL   High:  160 - 189 mg/dL   Very High: >= 190 mg/dL    Non HDL Cholesterol  Desirable: < 130 mg/dL  Above Desirable: 130 - 159 mg/dL  Borderline High: 160 - 189 mg/dL  High: 190 - 219 mg/dL  Very High: >= 220 mg/dL   Vitamin D Deficiency   Result Value Ref Range    Vitamin D, Total (25-Hydroxy) 32 20 - 50 ng/mL      Comment:      optimum levels    Narrative    Season, race, dietary intake, and treatment affect the concentration of 25-hydroxy-Vitamin D. Values may decrease during winter months and increase during summer months.    Vitamin D determination is routinely performed by an immunoassay specific for 25 hydroxyvitamin D3.  If an individual is on vitamin D2(ergocalciferol) supplementation, please specify 25 OH vitamin D2 and D3 level determination by LCMSMS test [id number].         If you have any questions or concerns, please call the clinic at the number listed above.       Sincerely,      Cameron Walker MD    Electronically signed           show

## 2025-04-21 NOTE — H&P ADULT - NSHPSOCIALHISTORY_GEN_ALL_CORE
11-20 minutes were dedicated to e-visit.  This includes review questionnaire, review of chart, review of OARRS, prescription medication, messaging the patient.    
denies tobacco use or EtOH

## 2025-05-01 NOTE — PATIENT PROFILE ADULT - ABILITY TO HEAR (WITH HEARING AID OR HEARING APPLIANCE IF NORMALLY USED):
"   Medical Group Progress Note  ASSESSMENT & PLAN:     Acute respiratory failure with hypoxia  Pneumonia, presumed gram negatives  -CXR showing patchy airspace consolidation in the right base with small pleural effusion  -aspiration also a consideration; will have SLP see  -cont with rocephin/doxy for now  -check urine antigens, procal  -wean supplemental o2 as tolerated  -add PRN nebs  -gentle IVF overnight, appears dry; Cr is up slightly from baseline though not quite to VERONICA levels     Persistent atrial fibrillation  SSS s/p PPM  Chronic diastolic CHF  CAD  HTN  HLD  -cont home meds including eliquis  -no ongoing chest pain  -BNP mildly elevated but clinically appears euvolemic to dry     Dementia with behavioral disturbance  Depression  -cont home meds              VTE Prophylaxis: eliquis      5/1/25:  Cont with current antibiotics for another day  Wean o2  Likely discharge back to LTC tomorrow with oral antibiotics to complete course  Renal function better, intake is adequate, ok off IVF for now      Juan Shaw MD    SUBJECTIVE     No overnight events. Resting in bed. Some cough. No chest pain. Minimal dyspnea.     OBJECTIVE:     Last Recorded Vitals:  Vitals:    04/30/25 1921 04/30/25 2000 05/01/25 0109 05/01/25 0717   BP: 162/82  154/67 165/71   Pulse:   69 70   Resp: 17  18    Temp: 36.1 °C (97 °F)  36.2 °C (97.2 °F) 35.9 °C (96.6 °F)   TempSrc:       SpO2: 91% 95% 97% 99%   Weight:  70.2 kg (154 lb 12.2 oz)     Height:  1.651 m (5' 5\")       Last I/O:  I/O last 3 completed shifts:  In: 360 (5.1 mL/kg) [P.O.:360]  Out: - (0 mL/kg)   Weight: 70.2 kg     Physical Exam    GEN: resting in bed, pleasantly confused  HEENT: NCAT, PERRLA, EOMI, moist mucous membranes  NECK: supple, no masses  CV: RRR, no m/r/g, no LE edema  LUNGS: diminished R base, R sided rhonchi, mild expiratory wheeze, no crackles  ABD: soft, NT, ND, NBS  SKIN: no rashes  MSK; no gross deformities, normal joints  NEURO: A+Ox (self and " place), no FND  PSYCH: appropriate mood, affect      Inpatient Medications:  Scheduled Medications[1]    PRN Medications  PRN Medications[2]    Continuous Medications:  Continuous Medications[3]  LABS AND IMAGING:     Labs:  Results from last 7 days   Lab Units 05/01/25  0843 04/30/25  1017   WBC AUTO x10*3/uL 8.5 13.5*   RBC AUTO x10*6/uL 3.35* 3.79*   HEMOGLOBIN g/dL 9.7* 11.0*   HEMATOCRIT % 30.2* 34.9*   MCV fL 90 92   MCH pg 29.0 29.0   MCHC g/dL 32.1 31.5*   RDW % 13.3 13.7   PLATELETS AUTO x10*3/uL 174 181     Results from last 7 days   Lab Units 05/01/25  0843 04/30/25  1017   SODIUM mmol/L 141 140   POTASSIUM mmol/L 3.9 4.6   CHLORIDE mmol/L 105 106   CO2 mmol/L 31 23   BUN mg/dL 26* 28*   CREATININE mg/dL 0.97 1.08*   GLUCOSE mg/dL 149* 224*   PROTEIN TOTAL g/dL  --  6.9   CALCIUM mg/dL 8.7 9.0   BILIRUBIN TOTAL mg/dL  --  0.7   ALK PHOS U/L  --  56   AST U/L  --  17   ALT U/L  --  5*         Results from last 7 days   Lab Units 04/30/25  1146 04/30/25  1017   TROPHS ng/L 10 8     Imaging:  XR chest 1 view  Narrative: STUDY:  Chest Radiograph;  [4-; 11:03 am]  INDICATION:  Chest pain.  COMPARISON:  XR chest 2-  ACCESSION NUMBER(S):  [id number]  ORDERING CLINICIAN:  AFIZAN ALAMO  TECHNIQUE:  Frontal chest was obtained at 11:02 hours.  FINDINGS:  CARDIOMEDIASTINAL SILHOUETTE:  Heart is enlarged with left-sided cardiac pacemaker.  Mild vascular  congestion.     LUNGS:  Patchy areas airspace consolidation in the right base with  accompanying small right pleural effusion.  Linear atelectasis in the  left base.     ABDOMEN:  No remarkable upper abdominal findings.     BONES:  No acute osseous changes.  Impression: Patchy areas airspace consolidation in the right base with  accompanying small right pleural effusion. Correlate clinically for  pneumonia.  Signed by Donte Quezada MD  ECG 12 lead  AV dual-paced rhythm with prolonged AV conduction  Abnormal ECG  When compared with ECG of  30-APR-2025 10:02, (unconfirmed)  No significant change was found  ECG 12 lead  AV dual-paced rhythm with prolonged AV conduction  Abnormal ECG  When compared with ECG of 09-FEB-2025 02:22,  No significant change was found              [1] apixaban, 2.5 mg, oral, BID  cefTRIAXone, 1 g, intravenous, q24h  doxycycline, 100 mg, oral, q12h LUIS  escitalopram, 10 mg, oral, Daily  [Held by provider] furosemide, 20 mg, oral, Once per day on Monday Wednesday Friday  isosorbide mononitrate ER, 60 mg, oral, Daily  lactobacillus acidophilus, 1 tablet, oral, Daily  levothyroxine, 75 mcg, oral, Daily  magnesium oxide, 200 mg, oral, Daily  [Held by provider] metoprolol succinate XL, 100 mg, oral, BID  pantoprazole, 20 mg, oral, Daily  sennosides, 2 tablet, oral, BID  traZODone, 25 mg, oral, Nightly  [2] PRN medications: acetaminophen **OR** acetaminophen **OR** acetaminophen, ipratropium-albuteroL  [3] lactated Ringer's, 65 mL/hr, Last Rate: Stopped (05/01/25 0915)     Adequate: hears normal conversation without difficulty

## 2025-05-07 NOTE — ED PROVIDER NOTE - NS ED ATTENDING STATEMENT MOD
Dori Martinez is a 22 y.o. female who presents for Pharyngitis (Sx started yesterday with an itch in her throat but today this morning she said it was in severe pain and it hurts to swallow, /)      HPI  This is a new problem. Dori Martinez is a 22 y.o. patient who presents to urgent care with c/o: sore throat started yesterday. Hurts to swallow. Concerned about possible strep throat infection.   Denies fever. Unknown exposure.   No other aggravating or alleviating factors.  Denies any other concerns at this time.       ROS See HPI    Allergies:       Allergies   Allergen Reactions    Influenza Vaccine Live Hives     Hives to face and arms; NASAL MIST ONLY       PMSFS Hx:  Past Medical History:   Diagnosis Date    Anxiety     Depression     GERD (gastroesophageal reflux disease)     Ovarian cyst     Panic disorder     Self-injurious behavior     Suicide attempt (HCC)      No past surgical history on file.  Family History   Problem Relation Age of Onset    Diabetes Mother     Alcohol abuse Father     Breast Cancer Maternal Aunt     Alcohol abuse Maternal Grandfather     Hypertension Maternal Grandfather     Hyperlipidemia Maternal Grandfather     Alcohol abuse Paternal Grandfather     Other Paternal Grandfather         PTSD related to [place] War    Psychiatric Illness Paternal Grandfather         depression, ptsd    Alcohol abuse Paternal Grandmother     Cancer Maternal Aunt     Breast Cancer Maternal Aunt     Autism Brother      Social History     Tobacco Use    Smoking status: Never    Smokeless tobacco: Never   Substance Use Topics    Alcohol use: Yes         Problems:   Patient Active Problem List   Diagnosis    Generalized anxiety disorder    Allergic rhinitis    Hyperlipidemia    Migraine    Recurrent major depressive disorder, in full remission (HCC)       Medications:   Current Outpatient Medications on File Prior to Visit   Medication Sig Dispense Refill    etonogestrel (NEXPLANON) 68 MG  "Implant implant       buPROPion (WELLBUTRIN XL) 300 MG XL tablet Take 1 Tablet by mouth every morning for 270 days. 90 Tablet 2    venlafaxine (EFFEXOR) 37.5 MG Tab Take 37.5 mg by mouth 3 times a day.       No current facility-administered medications on file prior to visit.        Objective:     BP (!) 146/108   Pulse (!) 116   Temp 37 °C (98.6 °F) (Temporal)   Resp 18   Ht 1.727 m (5' 8\")   Wt 113 kg (248 lb 14.4 oz)   SpO2 97%   BMI 37.85 kg/m²     Physical Exam  Vitals and nursing note reviewed.   Constitutional:       General: She is not in acute distress.     Appearance: Normal appearance. She is well-developed. She is not toxic-appearing.   HENT:      Head: Normocephalic.      Right Ear: Hearing, tympanic membrane, ear canal and external ear normal.      Left Ear: Hearing, tympanic membrane, ear canal and external ear normal.      Nose: Nose normal.      Right Sinus: No frontal sinus tenderness.      Left Sinus: No frontal sinus tenderness.      Mouth/Throat:      Mouth: Mucous membranes are moist.      Pharynx: Uvula midline. Posterior oropharyngeal erythema present. No oropharyngeal exudate.      Tonsils: No tonsillar abscesses.   Eyes:      General: Lids are normal.      Pupils: Pupils are equal, round, and reactive to light.   Neck:      Trachea: Trachea and phonation normal.   Cardiovascular:      Rate and Rhythm: Normal rate and regular rhythm.      Pulses: Normal pulses.      Heart sounds: Normal heart sounds.   Pulmonary:      Effort: Pulmonary effort is normal.      Breath sounds: Normal breath sounds.   Musculoskeletal:         General: Normal range of motion.      Cervical back: Full passive range of motion without pain, normal range of motion and neck supple.   Lymphadenopathy:      Head:      Right side of head: No tonsillar adenopathy.      Left side of head: No tonsillar adenopathy.      Cervical: No cervical adenopathy.      Upper Body:      Right upper body: No supraclavicular " adenopathy.      Left upper body: No supraclavicular adenopathy.   Skin:     General: Skin is warm and dry.      Capillary Refill: Capillary refill takes less than 2 seconds.      Findings: No rash.   Neurological:      Mental Status: She is alert and oriented to person, place, and time.      Deep Tendon Reflexes: Reflexes are normal and symmetric.   Psychiatric:         Mood and Affect: Mood normal.         Speech: Speech normal.         Behavior: Behavior normal.         Assessment /Associated Orders:      1. Acute pharyngitis, unspecified etiology  POCT CEPHEID GROUP A STREP - PCR            Medical Decision Making:    Dori Martinez is a very pleasant 22 y.o. female who is clinically stable at today's acute urgent care visit. Presents with acute problem/ concern today.    No acute distress is noted at the time of the visit.  VSS. Appropriate for outpatient care at this time.     Neg GAS by PCR today   Declines COVID, influenza, RSV viral resp PCR testing today. Advised that symptoms could be consistent with known viral infections. Verbalized understanding.   Discussed that this illness appears to be viral in nature. I did not see any evidence of a bacterial infection on exam today.   OTC medications can be used for symptom relief. Follow manufacturers guidelines for dosing and instructions.  These OTC medications will not make you better any faster, but can help reduce your discomfort by treating the symptoms.     Salt water gargles BID and prn. Suggested 1/4 to 1/2 teaspoon (1.5 to 3.0 g) of salt per one cup (8 ounces or 250 mL) of warm water.   OTC throat analgesic spray or lozenge of choice prn throat pain. Dosage and directions per   OTC  analgesic of choice (acetaminophen or NSAID) prn pain. Follow manufactures dosing and safety precautions.   Stay well hydrated.       Through shared decision making a discussion of the Dx and DDx, management options (risks,benefits, and alternatives to  planned treatment), natural progression, supportive care and indications for immediate follow-up discussed. Expressed understanding and the treatment plan was agreed upon.    Questions were encouraged and answered     Follow Up:   Return to urgent care prn if new or worsening sx or if there is no improvement in condition prn.    Educated in Red flags and indications to immediately call 911 or present to the Emergency Department.             Please note that this dictation was created using voice recognition software. I have worked with consultants from the vendor as well as technical experts from [facility] to optimize the interface. I have made every reasonable attempt to correct obvious errors, but I expect that there are errors of grammar and possibly content that I did not discover before finalizing the note.  This note was electronically signed by provider       I have personally seen and examined this patient.  I have fully participated in the care of this patient. I have reviewed all pertinent clinical information, including history, physical exam, plan and the Resident’s note and agree except as noted.

## 2025-05-27 NOTE — PATIENT PROFILE ADULT - IS PATIENT POST-MENOPAUSAL?
Patricia Queen (:  
yes

## 2025-06-03 NOTE — PROGRESS NOTE ADULT - SUBJECTIVE AND OBJECTIVE BOX
Hub staff attempted to follow warm transfer process and was unsuccessful     Caller: Rajwinder An    Relationship to patient: Self    Best call back number: [phone]    Patient is needing: PATIENT IS NEEDING TO SPEAK WITH THE OFFICE IN REGARDS TO FMLA PAPERWORK.  PLEASE REACH OUT TO ASSIST.  THANK YOU    CC: F/u ESRD on HD, recurrent paracentesis,+COVID-19  INTERVAL HPI/OVERNIGHT EVENTS: Pt seen and examined at bedside this AM by Hospitalist and PA. O2 98% on room air. Spoke to Pt via . States mild abd pain and skin tightness around abdomen. With poor appetite. Having  paracentesis by IR today. Denies fever, chills, chest pain, difficulty breathing, n/v or any other complaints. Later complained of "colic pain" in stomach that goes to her back after paracentesis that she has had for "years." She has this same pain at home, chronic and usually goes away on its own without taking medications. Pain lessoned with standing pain meds and declined any additional medication. Denies chest pain or pressure, difficulty breathing, numbness or tingling or abd pain or any other complaints.     This morning, pt seen and examined at bedside with Dr. Lloyd and tele . Pt reports she is feeling a little better today.  She denies SOB, dyspnea, abd pain, n/v.  She underwent a RLQ IR guided paracentesis and HD yesterday. Pt does admit to Lt chest wall pain- but reports that is chronic - she experiences at home.  She is A & O x 3 but slow to respond/ answer questions.  Pt reports her last BM was day she was admitted to hospital.     REVIEW OF SYSTEMS:  CONSTITUTIONAL: No fever, weight loss, or fatigue  NECK: No pain or stiffness  RESPIRATORY: see HPI  CARDIOVASCULAR: No chest pain, palpitations, dizziness, or leg swelling  GASTROINTESTINAL: no pain/ n/v/ diarrhea  NEUROLOGICAL: No headaches, memory loss, loss of strength, numbness, or tremors  SKIN: No itching, burning, rashes, or lesions   MUSCULOSKELETAL: No joint pain or swelling; No muscle, back, or extremity pain    Allergies  eggs (Anaphylaxis)  No Known Drug Allergies    PAST MEDICAL & SURGICAL HISTORY:  End stage renal disease  Diabetes  Coronary artery disease, angina presence unspecified, unspecified vessel or lesion type, unspecified whether native or transplanted heart  Paroxysmal atrial fibrillation  Anemia due to chronic kidney disease, unspecified CKD stage  Chronic systolic congestive heart failure  Pleural effusion  Pericardial effusion  End stage renal disease on dialysis  HLD (hyperlipidemia)  HTN (hypertension)  DM (diabetes mellitus)  A-V fistula  A-V fistula  Encounter for dialysis catheter care    Vital Signs Last 24 Hrs  T(C): 36.7 (07 May 2020 08:06), Max: 36.8 (06 May 2020 15:02)  T(F): 98 (07 May 2020 08:06), Max: 98.3 (06 May 2020 15:02)  HR: 68 (07 May 2020 08:06) (68 - 81)  BP: 142/80 (07 May 2020 08:06) (133/64 - 194/90)  BP(mean): --  RR: 16 (07 May 2020 08:06) (16 - 20)  SpO2: 92% (07 May 2020 08:06) (92% - 99%)      PHYSICAL EXAM:  GENERAL: Pt lying in bed comfortably in NAD  HEAD:  Atraumatic   EYES: EOMI, PERRL, conjunctiva and sclera clear  ENT: Moist mucous membranes  NECK: Supple, No JVD  CHEST/LUNG: Clear to auscultation bilaterally; No rales, rhonchi, wheezing, or rubs. Unlabored respirations  HEART: Regular rate and rhythm; No murmurs, rubs, or gallops  ABDOMEN: NT, ND, soft, +BS  EXTREMITIES:  2+ Peripheral Pulses, brisk capillary refill. No clubbing, cyanosis. no edema  NERVOUS SYSTEM:  Alert & Oriented X3, speech clear but slow to respond FROM x 4 extremities. No deficits   SKIN: No rashes or lesions    LABS:                                    9.6    5.00  )-----------( 206      ( 07 May 2020 07:37 )             31.1     05-07    136  |  96<L>  |  17.0  ----------------------------<  125<H>  4.4   |  32.0<H>  |  3.56<H>    Ca    8.6      07 May 2020 07:37  Phos  2.7     05-07    TPro  7.8  /  Alb  3.6  /  TBili  0.7  /  DBili  x   /  AST  18  /  ALT  7   /  AlkPhos  218<H>  05-05      CAPILLARY BLOOD GLUCOSE      POCT Blood Glucose.: 129 mg/dL (07 May 2020 07:53)  POCT Blood Glucose.: 110 mg/dL (06 May 2020 20:04)  POCT Blood Glucose.: 160 mg/dL (06 May 2020 15:51)  POCT Blood Glucose.: 181 mg/dL (06 May 2020 12:32)

## 2025-06-26 NOTE — PRE-OP CHECKLIST - ALLERGIES REVIEWED
done Detail Level: Detailed Include Location In Plan?: Yes Hide Additional Notes?: No Detail Level: Generalized

## 2025-07-08 NOTE — ED PROVIDER NOTE - CONTEXT
Head , normocephalic , atraumatic , Face , Face within normal limits , Ears , External ears within normal limits , Nose/Nasopharynx , External nose  normal appearance , Mouth and Throat , Oral cavity appearance normal
unknown

## 2025-07-23 NOTE — DISCHARGE NOTE NURSING/CASE MANAGEMENT/SOCIAL WORK - NSDCPEFALRISK_GEN_ALL_CORE
Anesthesia Post-op Note    Patient: Teresa Stafford  Procedure(s) Performed: LEFT CATARACT PHACOEMULSIFICATION WITH INTRAOCULAR LENS IMPLANT (Left: Eye)  Anesthesia type: MAC    Vitals Value Taken Time   Temp 36.9 °C (98.4 °F) 07/23/25 0804   Pulse 83 07/23/25 0804   Resp 16 07/23/25 0804   SpO2 99 % 07/23/25 0804   /53 07/23/25 0804         Patient Location: bedside  Post-op Vital Signs:stable  Level of Consciousness: awake and alert  Respiratory Status: spontaneous ventilation  Cardiovascular stable  Hydration: euvolemic  Pain Management: adequately controlled  Handoff: Handoff to receiving clinician was performed and questions were answered  Vomiting: none  Nausea: None  Airway Patency:patent  Post-op Assessment: awake, alert, appropriately conversant, or baseline, no complications, patient tolerated procedure well, dentition, mouth, tongue, and oropharynx unchanged , moving all extremities and No Corneal Abrasion  Comments: Patient did very well. Expressing gratitude for care.      There were no known notable events for this encounter.                      
Patient information on fall and injury prevention